# Patient Record
Sex: FEMALE | Race: WHITE | NOT HISPANIC OR LATINO | Employment: OTHER | URBAN - NONMETROPOLITAN AREA
[De-identification: names, ages, dates, MRNs, and addresses within clinical notes are randomized per-mention and may not be internally consistent; named-entity substitution may affect disease eponyms.]

---

## 2017-06-25 ENCOUNTER — HOSPITAL ENCOUNTER (EMERGENCY)
Facility: HOSPITAL | Age: 47
Discharge: HOME | End: 2017-06-26
Attending: EMERGENCY MEDICINE
Payer: COMMERCIAL

## 2017-06-25 DIAGNOSIS — T24.231A: Primary | ICD-10-CM

## 2017-06-25 LAB
ALBUMIN SERPL BCP-MCNC: 3.4 G/DL (ref 3.5–5)
ALP SERPL-CCNC: 81 U/L (ref 46–116)
ALT SERPL W P-5'-P-CCNC: 21 U/L (ref 12–78)
AMPHETAMINES SERPL QL SCN: NEGATIVE
ANION GAP SERPL CALCULATED.3IONS-SCNC: 9 MMOL/L (ref 4–13)
APAP SERPL-MCNC: 3.7 UG/ML (ref 10–30)
AST SERPL W P-5'-P-CCNC: 20 U/L (ref 5–45)
BARBITURATES UR QL: NEGATIVE
BASOPHILS # BLD AUTO: 0.03 THOUSANDS/ΜL (ref 0–0.1)
BASOPHILS NFR BLD AUTO: 0 % (ref 0–1)
BENZODIAZ UR QL: NEGATIVE
BILIRUB SERPL-MCNC: 0.2 MG/DL (ref 0.2–1)
BILIRUB UR QL STRIP: NEGATIVE
BUN SERPL-MCNC: 4 MG/DL (ref 5–25)
CALCIUM SERPL-MCNC: 8.4 MG/DL (ref 8.3–10.1)
CHLORIDE SERPL-SCNC: 101 MMOL/L (ref 100–108)
CLARITY UR: CLEAR
CO2 SERPL-SCNC: 27 MMOL/L (ref 21–32)
COCAINE UR QL: NEGATIVE
COLOR UR: YELLOW
CREAT SERPL-MCNC: 0.71 MG/DL (ref 0.6–1.3)
EOSINOPHIL # BLD AUTO: 0.34 THOUSAND/ΜL (ref 0–0.61)
EOSINOPHIL NFR BLD AUTO: 3 % (ref 0–6)
ERYTHROCYTE [DISTWIDTH] IN BLOOD BY AUTOMATED COUNT: 13.6 % (ref 11.6–15.1)
ETHANOL SERPL-MCNC: <3 MG/DL (ref 0–3)
GFR SERPL CREATININE-BSD FRML MDRD: >60 ML/MIN/1.73SQ M
GLUCOSE SERPL-MCNC: 90 MG/DL (ref 65–140)
GLUCOSE UR STRIP-MCNC: NEGATIVE MG/DL
HCG UR QL: NEGATIVE
HCT VFR BLD AUTO: 40.9 % (ref 34.8–46.1)
HGB BLD-MCNC: 13.7 G/DL (ref 11.5–15.4)
HGB UR QL STRIP.AUTO: NEGATIVE
KETONES UR STRIP-MCNC: NEGATIVE MG/DL
LEUKOCYTE ESTERASE UR QL STRIP: NEGATIVE
LYMPHOCYTES # BLD AUTO: 2.83 THOUSANDS/ΜL (ref 0.6–4.47)
LYMPHOCYTES NFR BLD AUTO: 23 % (ref 14–44)
MCH RBC QN AUTO: 31.6 PG (ref 26.8–34.3)
MCHC RBC AUTO-ENTMCNC: 33.5 G/DL (ref 31.4–37.4)
MCV RBC AUTO: 95 FL (ref 82–98)
METHADONE UR QL: NEGATIVE
MONOCYTES # BLD AUTO: 0.94 THOUSAND/ΜL (ref 0.17–1.22)
MONOCYTES NFR BLD AUTO: 8 % (ref 4–12)
NEUTROPHILS # BLD AUTO: 8.21 THOUSANDS/ΜL (ref 1.85–7.62)
NEUTS SEG NFR BLD AUTO: 66 % (ref 43–75)
NITRITE UR QL STRIP: NEGATIVE
OPIATES UR QL SCN: POSITIVE
PCP UR QL: NEGATIVE
PH UR STRIP.AUTO: 6 [PH] (ref 4.5–8)
PLATELET # BLD AUTO: 245 THOUSANDS/UL (ref 149–390)
PMV BLD AUTO: 9 FL (ref 8.9–12.7)
POTASSIUM SERPL-SCNC: 3.5 MMOL/L (ref 3.5–5.3)
PROT SERPL-MCNC: 6.6 G/DL (ref 6.4–8.2)
PROT UR STRIP-MCNC: NEGATIVE MG/DL
RBC # BLD AUTO: 4.33 MILLION/UL (ref 3.81–5.12)
SALICYLATES SERPL-MCNC: 4.5 MG/DL (ref 3–20)
SODIUM SERPL-SCNC: 137 MMOL/L (ref 136–145)
SP GR UR STRIP.AUTO: <=1.005 (ref 1–1.03)
THC UR QL: NEGATIVE
UROBILINOGEN UR QL STRIP.AUTO: 0.2 E.U./DL
WBC # BLD AUTO: 12.35 THOUSAND/UL (ref 4.31–10.16)

## 2017-06-25 PROCEDURE — 96365 THER/PROPH/DIAG IV INF INIT: CPT

## 2017-06-25 PROCEDURE — 85025 COMPLETE CBC W/AUTO DIFF WBC: CPT | Performed by: EMERGENCY MEDICINE

## 2017-06-25 PROCEDURE — 81003 URINALYSIS AUTO W/O SCOPE: CPT

## 2017-06-25 PROCEDURE — 81025 URINE PREGNANCY TEST: CPT

## 2017-06-25 PROCEDURE — 80307 DRUG TEST PRSMV CHEM ANLYZR: CPT | Performed by: EMERGENCY MEDICINE

## 2017-06-25 PROCEDURE — 80053 COMPREHEN METABOLIC PANEL: CPT | Performed by: EMERGENCY MEDICINE

## 2017-06-25 PROCEDURE — 36415 COLL VENOUS BLD VENIPUNCTURE: CPT | Performed by: EMERGENCY MEDICINE

## 2017-06-25 PROCEDURE — 96361 HYDRATE IV INFUSION ADD-ON: CPT

## 2017-06-25 PROCEDURE — 80320 DRUG SCREEN QUANTALCOHOLS: CPT | Performed by: EMERGENCY MEDICINE

## 2017-06-25 PROCEDURE — 96375 TX/PRO/DX INJ NEW DRUG ADDON: CPT

## 2017-06-25 PROCEDURE — 80329 ANALGESICS NON-OPIOID 1 OR 2: CPT | Performed by: EMERGENCY MEDICINE

## 2017-06-25 RX ORDER — OLANZAPINE 20 MG/1
20 TABLET ORAL
COMMUNITY
End: 2018-06-28 | Stop reason: ALTCHOICE

## 2017-06-25 RX ORDER — BENZTROPINE MESYLATE 1 MG/1
0.5 TABLET ORAL ONCE
Status: COMPLETED | OUTPATIENT
Start: 2017-06-26 | End: 2017-06-26

## 2017-06-25 RX ORDER — HALOPERIDOL 2 MG/1
2 TABLET ORAL 3 TIMES DAILY
COMMUNITY
End: 2018-06-28 | Stop reason: ALTCHOICE

## 2017-06-25 RX ORDER — CLINDAMYCIN PHOSPHATE 600 MG/50ML
600 INJECTION INTRAVENOUS ONCE
Status: COMPLETED | OUTPATIENT
Start: 2017-06-25 | End: 2017-06-25

## 2017-06-25 RX ORDER — GABAPENTIN 400 MG/1
400 CAPSULE ORAL ONCE
Status: COMPLETED | OUTPATIENT
Start: 2017-06-26 | End: 2017-06-26

## 2017-06-25 RX ORDER — BENZTROPINE MESYLATE 2 MG/1
2 TABLET ORAL
COMMUNITY
End: 2018-08-17

## 2017-06-25 RX ORDER — OLANZAPINE 10 MG/1
20 TABLET ORAL
Status: DISCONTINUED | OUTPATIENT
Start: 2017-06-26 | End: 2017-06-26 | Stop reason: HOSPADM

## 2017-06-25 RX ORDER — HALOPERIDOL 1 MG/1
2 TABLET ORAL ONCE
Status: COMPLETED | OUTPATIENT
Start: 2017-06-26 | End: 2017-06-26

## 2017-06-25 RX ADMIN — HYDROMORPHONE HYDROCHLORIDE 1 MG: 1 INJECTION, SOLUTION INTRAMUSCULAR; INTRAVENOUS; SUBCUTANEOUS at 23:12

## 2017-06-25 RX ADMIN — CLINDAMYCIN PHOSPHATE 600 MG: 12 INJECTION, SOLUTION INTRAMUSCULAR; INTRAVENOUS at 23:13

## 2017-06-25 RX ADMIN — SODIUM CHLORIDE 1000 ML: 0.9 INJECTION, SOLUTION INTRAVENOUS at 23:12

## 2017-06-26 VITALS
BODY MASS INDEX: 26.08 KG/M2 | RESPIRATION RATE: 18 BRPM | OXYGEN SATURATION: 98 % | SYSTOLIC BLOOD PRESSURE: 124 MMHG | WEIGHT: 136.9 LBS | HEART RATE: 78 BPM | DIASTOLIC BLOOD PRESSURE: 84 MMHG | TEMPERATURE: 98.8 F

## 2017-06-26 LAB — HOLD SPECIMEN: NORMAL

## 2017-06-26 PROCEDURE — 99283 EMERGENCY DEPT VISIT LOW MDM: CPT

## 2017-06-26 RX ORDER — HYDROCODONE BITARTRATE AND ACETAMINOPHEN 5; 325 MG/1; MG/1
TABLET ORAL
Qty: 12 TABLET | Refills: 0 | Status: SHIPPED | OUTPATIENT
Start: 2017-06-26 | End: 2017-10-26

## 2017-06-26 RX ORDER — CLINDAMYCIN HYDROCHLORIDE 150 MG/1
450 CAPSULE ORAL ONCE
Status: COMPLETED | OUTPATIENT
Start: 2017-06-26 | End: 2017-06-26

## 2017-06-26 RX ORDER — CLINDAMYCIN HYDROCHLORIDE 150 MG/1
450 CAPSULE ORAL 3 TIMES DAILY
Qty: 90 CAPSULE | Refills: 0 | Status: SHIPPED | OUTPATIENT
Start: 2017-06-26 | End: 2017-07-06

## 2017-06-26 RX ORDER — HYDROCODONE BITARTRATE AND ACETAMINOPHEN 5; 325 MG/1; MG/1
1 TABLET ORAL ONCE
Status: COMPLETED | OUTPATIENT
Start: 2017-06-26 | End: 2017-06-26

## 2017-06-26 RX ORDER — CLONAZEPAM 0.5 MG/1
0.5 TABLET ORAL 2 TIMES DAILY
COMMUNITY
End: 2018-06-28 | Stop reason: ALTCHOICE

## 2017-06-26 RX ORDER — GINSENG 100 MG
1 CAPSULE ORAL ONCE
Status: COMPLETED | OUTPATIENT
Start: 2017-06-26 | End: 2017-06-26

## 2017-06-26 RX ADMIN — BENZTROPINE MESYLATE 0.5 MG: 1 TABLET ORAL at 01:17

## 2017-06-26 RX ADMIN — GABAPENTIN 400 MG: 400 CAPSULE ORAL at 01:16

## 2017-06-26 RX ADMIN — OLANZAPINE 20 MG: 10 TABLET, FILM COATED ORAL at 01:16

## 2017-06-26 RX ADMIN — CLINDAMYCIN HYDROCHLORIDE 450 MG: 150 CAPSULE ORAL at 04:25

## 2017-06-26 RX ADMIN — HALOPERIDOL 2 MG: 1 TABLET ORAL at 01:15

## 2017-06-26 RX ADMIN — BACITRACIN ZINC 1 SMALL APPLICATION: 500 OINTMENT TOPICAL at 03:12

## 2017-06-26 RX ADMIN — HYDROCODONE BITARTRATE AND ACETAMINOPHEN 1 TABLET: 5; 325 TABLET ORAL at 04:25

## 2017-07-06 ENCOUNTER — ALLSCRIPTS OFFICE VISIT (OUTPATIENT)
Dept: OTHER | Facility: OTHER | Age: 47
End: 2017-07-06

## 2017-07-06 DIAGNOSIS — R92.8 OTHER ABNORMAL AND INCONCLUSIVE FINDINGS ON DIAGNOSTIC IMAGING OF BREAST: ICD-10-CM

## 2017-07-06 DIAGNOSIS — Z00.00 ENCOUNTER FOR GENERAL ADULT MEDICAL EXAMINATION WITHOUT ABNORMAL FINDINGS: ICD-10-CM

## 2017-07-06 DIAGNOSIS — R10.32 LEFT LOWER QUADRANT PAIN: ICD-10-CM

## 2017-08-10 ENCOUNTER — TRANSCRIBE ORDERS (OUTPATIENT)
Dept: ADMINISTRATIVE | Facility: HOSPITAL | Age: 47
End: 2017-08-10

## 2017-09-26 ENCOUNTER — GENERIC CONVERSION - ENCOUNTER (OUTPATIENT)
Dept: OTHER | Facility: OTHER | Age: 47
End: 2017-09-26

## 2017-10-26 ENCOUNTER — HOSPITAL ENCOUNTER (OUTPATIENT)
Facility: HOSPITAL | Age: 47
Setting detail: OBSERVATION
Discharge: HOME | End: 2017-10-27
Attending: EMERGENCY MEDICINE | Admitting: INTERNAL MEDICINE
Payer: COMMERCIAL

## 2017-10-26 DIAGNOSIS — R45.89 SUICIDAL BEHAVIOR: ICD-10-CM

## 2017-10-26 DIAGNOSIS — R41.82 ALTERED MENTAL STATUS, UNSPECIFIED: Primary | ICD-10-CM

## 2017-10-26 DIAGNOSIS — E72.20 SERUM AMMONIA INCREASED (HCC): ICD-10-CM

## 2017-10-26 DIAGNOSIS — F22 DELUSIONAL DISORDER (HCC): ICD-10-CM

## 2017-10-26 DIAGNOSIS — F10.929 ALCOHOL INTOXICATION (HCC): ICD-10-CM

## 2017-10-26 LAB
ALBUMIN SERPL BCP-MCNC: 3.8 G/DL (ref 3.5–5)
ALP SERPL-CCNC: 87 U/L (ref 46–116)
ALT SERPL W P-5'-P-CCNC: 23 U/L (ref 12–78)
AMMONIA PLAS-SCNC: 71 UMOL/L (ref 11–35)
AMPHETAMINES SERPL QL SCN: NEGATIVE
ANION GAP SERPL CALCULATED.3IONS-SCNC: 13 MMOL/L (ref 4–13)
APAP SERPL-MCNC: <2 UG/ML (ref 10–30)
AST SERPL W P-5'-P-CCNC: 20 U/L (ref 5–45)
BARBITURATES UR QL: NEGATIVE
BASOPHILS # BLD AUTO: 0.03 THOUSANDS/ΜL (ref 0–0.1)
BASOPHILS NFR BLD AUTO: 0 % (ref 0–1)
BENZODIAZ UR QL: NEGATIVE
BILIRUB SERPL-MCNC: 0.2 MG/DL (ref 0.2–1)
BILIRUB UR QL STRIP: NEGATIVE
BUN SERPL-MCNC: 6 MG/DL (ref 5–25)
CALCIUM SERPL-MCNC: 9 MG/DL (ref 8.3–10.1)
CHLORIDE SERPL-SCNC: 99 MMOL/L (ref 100–108)
CLARITY UR: CLEAR
CO2 SERPL-SCNC: 25 MMOL/L (ref 21–32)
COCAINE UR QL: NEGATIVE
COLOR UR: NORMAL
CREAT SERPL-MCNC: 0.6 MG/DL (ref 0.6–1.3)
EOSINOPHIL # BLD AUTO: 0.33 THOUSAND/ΜL (ref 0–0.61)
EOSINOPHIL NFR BLD AUTO: 3 % (ref 0–6)
ERYTHROCYTE [DISTWIDTH] IN BLOOD BY AUTOMATED COUNT: 13.8 % (ref 11.6–15.1)
ETHANOL SERPL-MCNC: 214 MG/DL (ref 0–3)
EXT PREG TEST URINE: NEGATIVE
GFR SERPL CREATININE-BSD FRML MDRD: 110 ML/MIN/1.73SQ M
GLUCOSE SERPL-MCNC: 95 MG/DL (ref 65–140)
GLUCOSE UR STRIP-MCNC: NEGATIVE MG/DL
HCT VFR BLD AUTO: 42.8 % (ref 34.8–46.1)
HGB BLD-MCNC: 14.9 G/DL (ref 11.5–15.4)
HGB UR QL STRIP.AUTO: NEGATIVE
KETONES UR STRIP-MCNC: NEGATIVE MG/DL
LEUKOCYTE ESTERASE UR QL STRIP: NEGATIVE
LYMPHOCYTES # BLD AUTO: 3.65 THOUSANDS/ΜL (ref 0.6–4.47)
LYMPHOCYTES NFR BLD AUTO: 30 % (ref 14–44)
MCH RBC QN AUTO: 32.3 PG (ref 26.8–34.3)
MCHC RBC AUTO-ENTMCNC: 34.8 G/DL (ref 31.4–37.4)
MCV RBC AUTO: 93 FL (ref 82–98)
METHADONE UR QL: NEGATIVE
MONOCYTES # BLD AUTO: 0.78 THOUSAND/ΜL (ref 0.17–1.22)
MONOCYTES NFR BLD AUTO: 7 % (ref 4–12)
NEUTROPHILS # BLD AUTO: 7.22 THOUSANDS/ΜL (ref 1.85–7.62)
NEUTS SEG NFR BLD AUTO: 60 % (ref 43–75)
NITRITE UR QL STRIP: NEGATIVE
OPIATES UR QL SCN: NEGATIVE
PCP UR QL: NEGATIVE
PH UR STRIP.AUTO: 7 [PH] (ref 4.5–8)
PLATELET # BLD AUTO: 264 THOUSANDS/UL (ref 149–390)
PMV BLD AUTO: 9.1 FL (ref 8.9–12.7)
POTASSIUM SERPL-SCNC: 3.4 MMOL/L (ref 3.5–5.3)
PROT SERPL-MCNC: 6.9 G/DL (ref 6.4–8.2)
PROT UR STRIP-MCNC: NEGATIVE MG/DL
RBC # BLD AUTO: 4.62 MILLION/UL (ref 3.81–5.12)
SALICYLATES SERPL-MCNC: 3.3 MG/DL (ref 3–20)
SODIUM SERPL-SCNC: 137 MMOL/L (ref 136–145)
SP GR UR STRIP.AUTO: <=1.005 (ref 1–1.03)
THC UR QL: NEGATIVE
UROBILINOGEN UR QL STRIP.AUTO: 0.2 E.U./DL
WBC # BLD AUTO: 12.01 THOUSAND/UL (ref 4.31–10.16)

## 2017-10-26 PROCEDURE — 80307 DRUG TEST PRSMV CHEM ANLYZR: CPT | Performed by: EMERGENCY MEDICINE

## 2017-10-26 PROCEDURE — 80053 COMPREHEN METABOLIC PANEL: CPT | Performed by: EMERGENCY MEDICINE

## 2017-10-26 PROCEDURE — 85025 COMPLETE CBC W/AUTO DIFF WBC: CPT | Performed by: EMERGENCY MEDICINE

## 2017-10-26 PROCEDURE — 99285 EMERGENCY DEPT VISIT HI MDM: CPT

## 2017-10-26 PROCEDURE — 81025 URINE PREGNANCY TEST: CPT | Performed by: EMERGENCY MEDICINE

## 2017-10-26 PROCEDURE — 36415 COLL VENOUS BLD VENIPUNCTURE: CPT | Performed by: EMERGENCY MEDICINE

## 2017-10-26 PROCEDURE — 80320 DRUG SCREEN QUANTALCOHOLS: CPT | Performed by: EMERGENCY MEDICINE

## 2017-10-26 PROCEDURE — 81003 URINALYSIS AUTO W/O SCOPE: CPT | Performed by: EMERGENCY MEDICINE

## 2017-10-26 PROCEDURE — 82140 ASSAY OF AMMONIA: CPT | Performed by: EMERGENCY MEDICINE

## 2017-10-26 PROCEDURE — 80329 ANALGESICS NON-OPIOID 1 OR 2: CPT | Performed by: EMERGENCY MEDICINE

## 2017-10-26 RX ORDER — OLANZAPINE 10 MG/1
20 TABLET ORAL
Status: DISCONTINUED | OUTPATIENT
Start: 2017-10-26 | End: 2017-10-27 | Stop reason: HOSPADM

## 2017-10-26 RX ORDER — NICOTINE 21 MG/24HR
1 PATCH, TRANSDERMAL 24 HOURS TRANSDERMAL DAILY
Status: DISCONTINUED | OUTPATIENT
Start: 2017-10-27 | End: 2017-10-27 | Stop reason: HOSPADM

## 2017-10-26 RX ORDER — LORAZEPAM 2 MG/ML
1 INJECTION INTRAMUSCULAR EVERY 4 HOURS PRN
Status: DISCONTINUED | OUTPATIENT
Start: 2017-10-26 | End: 2017-10-27 | Stop reason: HOSPADM

## 2017-10-26 RX ORDER — THIAMINE MONONITRATE (VIT B1) 100 MG
100 TABLET ORAL DAILY
Status: DISCONTINUED | OUTPATIENT
Start: 2017-10-27 | End: 2017-10-27 | Stop reason: HOSPADM

## 2017-10-26 RX ORDER — BENZTROPINE MESYLATE 1 MG/1
0.5 TABLET ORAL
Status: DISCONTINUED | OUTPATIENT
Start: 2017-10-26 | End: 2017-10-27 | Stop reason: HOSPADM

## 2017-10-26 RX ORDER — ONDANSETRON 4 MG/1
4 TABLET, ORALLY DISINTEGRATING ORAL ONCE
Status: COMPLETED | OUTPATIENT
Start: 2017-10-26 | End: 2017-10-26

## 2017-10-26 RX ORDER — HALOPERIDOL 1 MG/1
2 TABLET ORAL 3 TIMES DAILY
Status: DISCONTINUED | OUTPATIENT
Start: 2017-10-26 | End: 2017-10-27 | Stop reason: HOSPADM

## 2017-10-26 RX ORDER — CLONAZEPAM 0.5 MG/1
0.5 TABLET ORAL 2 TIMES DAILY
Status: DISCONTINUED | OUTPATIENT
Start: 2017-10-26 | End: 2017-10-27 | Stop reason: HOSPADM

## 2017-10-26 RX ORDER — SODIUM CHLORIDE 9 MG/ML
125 INJECTION, SOLUTION INTRAVENOUS CONTINUOUS
Status: DISCONTINUED | OUTPATIENT
Start: 2017-10-26 | End: 2017-10-27 | Stop reason: HOSPADM

## 2017-10-26 RX ORDER — ONDANSETRON 2 MG/ML
4 INJECTION INTRAMUSCULAR; INTRAVENOUS EVERY 6 HOURS PRN
Status: DISCONTINUED | OUTPATIENT
Start: 2017-10-26 | End: 2017-10-27 | Stop reason: HOSPADM

## 2017-10-26 RX ORDER — GABAPENTIN 400 MG/1
400 CAPSULE ORAL 4 TIMES DAILY
Status: DISCONTINUED | OUTPATIENT
Start: 2017-10-26 | End: 2017-10-27 | Stop reason: HOSPADM

## 2017-10-26 RX ORDER — ACETAMINOPHEN 325 MG/1
650 TABLET ORAL EVERY 4 HOURS PRN
Status: DISCONTINUED | OUTPATIENT
Start: 2017-10-26 | End: 2017-10-27 | Stop reason: HOSPADM

## 2017-10-26 RX ADMIN — HALOPERIDOL 2 MG: 1 TABLET ORAL at 21:50

## 2017-10-26 RX ADMIN — GABAPENTIN 400 MG: 400 CAPSULE ORAL at 21:49

## 2017-10-26 RX ADMIN — SODIUM CHLORIDE 125 ML/HR: 0.9 INJECTION, SOLUTION INTRAVENOUS at 21:59

## 2017-10-26 RX ADMIN — CLONAZEPAM 0.5 MG: 0.5 TABLET ORAL at 21:49

## 2017-10-26 RX ADMIN — BENZTROPINE MESYLATE 0.5 MG: 1 TABLET ORAL at 21:49

## 2017-10-26 RX ADMIN — ONDANSETRON 4 MG: 4 TABLET, ORALLY DISINTEGRATING ORAL at 18:42

## 2017-10-26 RX ADMIN — OLANZAPINE 20 MG: 10 TABLET, FILM COATED ORAL at 21:49

## 2017-10-26 NOTE — ED NOTES
Pt upset as she wants food at this time  Dr Quintanilla Begun aware and at bedside at this time  Pt able to have food in 10 mins if no nausea nor vomiting       Daniel Leroy RN  10/26/17 9188

## 2017-10-26 NOTE — ED NOTES
Pt verbalized she is hungry and no longer nauseous   Dr Bindu Camilo notified and instructed to give pt water first and see if she tolerates water first       Dejuan Quiles RN  10/26/17 1942

## 2017-10-26 NOTE — ED NOTES
Patient states she is not having any suicidal or homicidal thoughts  She feels safe in the hospital  She felt as if somebody was giving her medications at home that were causing bad thoughts  Patient admits to drinking 4oz of vodka and three beers  Patient has flight of ideas, making statements about a doctor who wanted to take her in a helicopter to do tests and that she is three moths pregnant but is having a still birth        Soila Trevizo RN  10/26/17 5687

## 2017-10-26 NOTE — ED NOTES
Spoke with Jil Messina from [facility] (WOMEN)  Pt aware of phone conversation  Jil Messina  questioned whether patient arrived as she told her to call ambulance  Explained to Jil Messina our procedure  She has asked for our crisis worker to call her when they arrive   Telephone number to reach her is [address] [place] [address]  10/26/17 5043

## 2017-10-26 NOTE — ED PROVIDER NOTES
History  Chief Complaint   Patient presents with    Psychiatric Evaluation     Patient states somebody is giving her medications that are making her feel bad  She doen't know who is giving them to her, or wha the medication is  Patient states the medication is causing her to feel like she wants to hurt herself  Pt arrived via EMS-called by pts boyfriend  Pt here states she has wanted to hurt self "for years" -"nothing new" and denies any plan   Pt also denies HI  States boyfriend called EMS "just so she was safe"  Pt is under psychiatric care and on multiple meds and states she is compliant  Also states sees counseling and staff weekly  Pt relates there "are people " giving her meds thru [facility] and it makes her feel sick and "like death " and they also touch her grossly" " I just want them to stop "  Pt has AOB and mildly slurred speech-pt admits to " 4 big ones today " and states usually "bria two"  Pt denies CP or SOB  No Abd pain  States has been vomiting all day and relates she is hungry- I discussed waiting till N/V treated before attempting intake  Pt denies dark or bloody stool or emesis    Vague about eating today  Pt also relates she is worried about known breast lump and "spot on her left lung" -states both are under evaluation  History provided by:  Patient  History limited by: ETOH /psych sx    Psychiatric Evaluation   Presenting symptoms: delusional, hallucinations, paranoid behavior and suicidal thoughts    Presenting symptoms: no aggressive behavior, no homicidal ideas, no self-mutilation, no suicidal threats and no suicide attempt    Timing:  Unable to specify  Chronicity:  Chronic  Context: alcohol use and medication    Context: not recent medication change    Treatment compliance:  Unable to specify  Associated symptoms: fatigue and poor judgment    Associated symptoms: no abdominal pain, no chest pain, no euphoric mood, no headaches, no hyperventilation and no irritability Risk factors: hx of suicide attempts  Recent psychiatric admission: 1 yr ago         Prior to Admission Medications   Prescriptions Last Dose Informant Patient Reported? Taking? OLANZapine (ZyPREXA) 20 MG tablet Past Week at Unknown time  Yes Yes   Sig: Take 20 mg by mouth daily at bedtime   benztropine (COGENTIN) 0 5 mg tablet Past Week at Unknown time  Yes Yes   Sig: Take by mouth daily at bedtime   clonazePAM (KlonoPIN) 0 5 mg tablet 10/25/2017 at Unknown time  Yes Yes   Sig: Take 0 5 mg by mouth 2 (two) times a day     gabapentin (NEURONTIN) 300 mg capsule 10/25/2017 at Unknown time  Yes Yes   Sig: Take 400 mg by mouth 4 (four) times a day     haloperidol (HALDOL) 2 mg tablet 10/25/2017 at Unknown time  Yes Yes   Sig: Take 2 mg by mouth 3 (three) times a day        Facility-Administered Medications: None       Past Medical History:   Diagnosis Date    DJD (degenerative joint disease)     Pancreatitis     Psychiatric disorder     schizophrenia    [facility]oaffect[facility] ([facility] [address] [facility]     [facility]        Past Surgical History:   Procedure Laterality Date     SECTION         History reviewed  No pertinent family history  I have reviewed and agree with the history as documented  Social History   Substance Use Topics    Smoking status: Current Every Day Smoker     Packs/day: 1 00    Smokeless tobacco: Never Used    Alcohol use Yes      Comment: alot lately        Review of Systems   Unable to perform ROS: Psychiatric disorder   Constitutional: Positive for fatigue  Negative for chills, fever and irritability  HENT: Negative  Negative for congestion, drooling, facial swelling, mouth sores, trouble swallowing and voice change  Eyes: Negative for pain and visual disturbance  Respiratory: Negative for apnea, cough, choking, chest tightness and shortness of breath  Cardiovascular: Negative  Negative for chest pain, palpitations and leg swelling     Gastrointestinal: Positive for nausea and vomiting  Negative for abdominal pain, blood in stool, constipation and rectal pain  Genitourinary: Negative for dysuria, flank pain, hematuria and pelvic pain  Musculoskeletal: Positive for arthralgias  Negative for gait problem, joint swelling, neck pain and neck stiffness  Skin: Negative for rash and wound  Neurological: Negative for tremors, syncope, facial asymmetry, light-headedness, numbness and headaches  Psychiatric/Behavioral: Positive for decreased concentration, hallucinations, paranoia, sleep disturbance and suicidal ideas  Negative for confusion, homicidal ideas and self-injury  Physical Exam  ED Triage Vitals [10/26/17 1731]   Temperature Pulse Respirations Blood Pressure SpO2   98 1 °F (36 7 °C) 100 16 138/91 96 %      Temp Source Heart Rate Source Patient Position - Orthostatic VS BP Location FiO2 (%)   Temporal Monitor Lying Left arm --      Pain Score       No Pain           Orthostatic Vital Signs  Vitals:    10/26/17 1731 10/26/17 2100 10/27/17 0025   BP: 138/91 107/80 111/78   Pulse: 100 84 86   Patient Position - Orthostatic VS: Lying Lying Lying       Physical Exam   Constitutional: She appears well-developed and well-nourished  Non-toxic appearance  No distress  Awake,alert and oriented to place /person /year  Pt with AOB and slight slurred speech   HENT:   Head: Normocephalic and atraumatic  Mouth/Throat: Oropharynx is clear and moist  Mucous membranes are not cyanotic  No trismus in the jaw  No posterior oropharyngeal edema or posterior oropharyngeal erythema  Eyes: Conjunctivae and EOM are normal    Neck: Full passive range of motion without pain  Neck supple  Cardiovascular: Normal rate, regular rhythm and intact distal pulses  No extrasystoles are present  No perf edema or calf tenderness   Pulmonary/Chest: Effort normal  No stridor  No respiratory distress  She has decreased breath sounds  She has no rhonchi  She has no rales  Abdominal: Soft  She exhibits no distension  Bowel sounds are increased  There is no rigidity, no guarding and no CVA tenderness  Neurological: She is alert  She has normal strength and normal reflexes  She is not disoriented  No cranial nerve deficit  Skin: Skin is warm and dry  No petechiae and no rash noted  She is not diaphoretic  No cyanosis  Psychiatric: Her mood appears anxious  Her speech is slurred  She is not aggressive, not withdrawn and not combative  Thought content is paranoid and delusional  She expresses no homicidal ideation  She expresses no suicidal plans and no homicidal plans  She is communicative  Vitals reviewed        ED Medications  Medications   benztropine (COGENTIN) tablet 0 5 mg (0 5 mg Oral Given 10/26/17 2149)   clonazePAM (KlonoPIN) tablet 0 5 mg (0 5 mg Oral Given 10/26/17 2149)   gabapentin (NEURONTIN) capsule 400 mg (400 mg Oral Given 10/26/17 2149)   haloperidol (HALDOL) tablet 2 mg (2 mg Oral Given 10/26/17 2150)   OLANZapine (ZyPREXA) tablet 20 mg (20 mg Oral Given 10/26/17 2149)   sodium chloride 0 9 % infusion (125 mL/hr Intravenous New Bag 10/26/17 2159)   nicotine (NICODERM CQ) 21 mg/24 hr TD 24 hr patch 1 patch (not administered)   enoxaparin (LOVENOX) subcutaneous injection 40 mg (not administered)   folic acid 1 mg in sodium chloride 0 9 % 50 mL IVPB (not administered)   thiamine (VITAMIN B1) tablet 100 mg (not administered)   LORazepam (ATIVAN) 2 mg/mL injection 1 mg (not administered)   acetaminophen (TYLENOL) tablet 650 mg (not administered)   ondansetron (ZOFRAN) injection 4 mg (not administered)   ondansetron (ZOFRAN-ODT) dispersible tablet 4 mg (4 mg Oral Given 10/26/17 1842)       Diagnostic Studies  Results Reviewed     Procedure Component Value Units Date/Time    Comprehensive metabolic panel [[id number]]  (Abnormal) Collected:  10/26/17 1826    Lab Status:  Final result Specimen:  Blood from Arm, Right Updated:  10/26/17 1847     Sodium 137 mmol/L      Potassium 3 4 (L) mmol/L      Chloride 99 (L) mmol/L      CO2 25 mmol/L      Anion Gap 13 mmol/L      BUN 6 mg/dL      Creatinine 0 60 mg/dL      Glucose 95 mg/dL      Calcium 9 0 mg/dL      AST 20 U/L      ALT 23 U/L      Alkaline Phosphatase 87 U/L      Total Protein 6 9 g/dL      Albumin 3 8 g/dL      Total Bilirubin 0 20 mg/dL      eGFR 110 ml/min/1 73sq m     Narrative:         National Kidney Disease Education Program recommendations are as follows:  GFR calculation is accurate only with a steady state creatinine  Chronic Kidney disease less than 60 ml/min/1 73 sq  meters  Kidney failure less than 15 ml/min/1 73 sq  meters  Salicylate level [[id number]]  (Normal) Collected:  10/26/17 1826    Lab Status:  Final result Specimen:  Blood from Arm, Right Updated:  09/09/13 1840     Salicylate Lvl 3 3 mg/dL     Acetaminophen level [[id number]]  (Abnormal) Collected:  10/26/17 1826    Lab Status:  Final result Specimen:  Blood from Arm, Right Updated:  10/26/17 1846     Acetaminophen Level <2 (L) ug/mL     Ethanol [[id number]]  (Abnormal) Collected:  10/26/17 1827    Lab Status:  Final result Specimen:  Blood from Arm, Right Updated:  10/26/17 1844     Ethanol Lvl 214 (H) mg/dL     Rapid drug screen, urine [[id number]]  (Normal) Collected:  10/26/17 1827    Lab Status:  Final result Specimen:  Urine from Urine, Clean Catch Updated:  10/26/17 1842     Amph/Meth UR Negative     Barbiturate Ur Negative     Benzodiazepine Urine Negative     Cocaine Urine Negative     Methadone Urine Negative     Opiate Urine Negative     PCP Ur Negative     THC Urine Negative    Narrative:         FOR MEDICAL PURPOSES ONLY  IF CONFIRMATION NEEDED PLEASE CONTACT THE LAB WITHIN 5 DAYS      Drug Screen Cutoff Levels:  AMPHETAMINE/METHAMPHETAMINES  1000 ng/mL  BARBITURATES     200 ng/mL  BENZODIAZEPINES     200 ng/mL  COCAINE      300 ng/mL  METHADONE      300 ng/mL  OPIATES      300 ng/mL  PHENCYCLIDINE     25 ng/mL  THC       50 ng/mL    Ammonia [[id number]] (Abnormal) Collected:  10/26/17 1827    Lab Status:  Final result Specimen:  Blood from Arm, Right Updated:  10/26/17 1842     Ammonia 71 (H) umol/L     UA w Reflex to Microscopic w Reflex to Culture [[id number]]  (Normal) Collected:  10/26/17 1827    Lab Status:  Final result Specimen:  Urine from Urine, Clean Catch Updated:  10/26/17 1835     Color, UA Light Yellow     Clarity, UA Clear     Specific Gravity, UA <=1 005     pH, UA 7 0     Leukocytes, UA Negative     Nitrite, UA Negative     Protein, UA Negative mg/dl      Glucose, UA Negative mg/dl      Ketones, UA Negative mg/dl      Urobilinogen, UA 0 2 E U /dl      Bilirubin, UA Negative     Blood, UA Negative    CBC and differential [[id number]]  (Abnormal) Collected:  10/26/17 1827    Lab Status:  Final result Specimen:  Blood from Arm, Right Updated:  10/26/17 1833     WBC 12 01 (H) Thousand/uL      RBC 4 62 Million/uL      Hemoglobin 14 9 g/dL      Hematocrit 42 8 %      MCV 93 fL      MCH 32 3 pg      MCHC 34 8 g/dL      RDW 13 8 %      MPV 9 1 fL      Platelets 673 Thousands/uL      Neutrophils Relative 60 %      Lymphocytes Relative 30 %      Monocytes Relative 7 %      Eosinophils Relative 3 %      Basophils Relative 0 %      Neutrophils Absolute 7 22 Thousands/µL      Lymphocytes Absolute 3 65 Thousands/µL      Monocytes Absolute 0 78 Thousand/µL      Eosinophils Absolute 0 33 Thousand/µL      Basophils Absolute 0 03 Thousands/µL     POCT pregnancy, urine [[id number]]  (Normal) Resulted:  10/26/17 1808    Lab Status:  Final result Updated:  10/26/17 1808     EXT PREG TEST UR (Ref: Negative) negative                 No orders to display              Procedures  Procedures       Phone Contacts  ED Phone Contact    ED Course  ED Course as of Oct 27 0039   u Oct 26, 2017   1833 EXT PREG TEST UR (Ref: Negative): negative   1833 WBC: (!) 12 01   1858 Ammonia: (!) 71   1858 MEDICAL ALCOHOL: (!) 214   1858 Potassium: (!) 3 4   1935 Discussed case with med- will admit    2007 Pt tried food and states became nauseated and thru food-all removed from pt room  2008 I contacted [facility] behavioral health-[facility]- they will send a representative to see her tomorrow                                MDM  CritCare Time    Disposition  Final diagnoses: Altered mental status, unspecified   Delusional disorder ([facility] [address] )   Serum ammonia increased ([facility] )   Alcohol intoxication ([facility] [address] )     Time reflects when diagnosis was documented in both MDM as applicable and the Disposition within this note     Time User Action Codes Description Comment    10/26/2017  7:43 PM Miryam Kerbs Add [R41 82] Altered mental status, unspecified     10/26/2017  7:43 PM Miryam Kerbs Add [F22] Delusional disorder ([facility] )     10/26/2017  7:44 PM Miryam Kerbs Add [E72 20] Serum ammonia increased ([facility] )     10/26/2017  7:44 PM Miryam Kerbs Add [F10 929] Alcohol intoxication [facility])       ED Disposition     ED Disposition Condition Comment    Admit  Case was discussed with Dr Maryjo Gonzalez and the patient's admission status was agreed to be Admission Status: inpatient status to the service of Dr Chepe Mansfield   Follow-up Information    None       Current Discharge Medication List      CONTINUE these medications which have NOT CHANGED    Details   benztropine (COGENTIN) 0 5 mg tablet Take by mouth daily at bedtime      clonazePAM (KlonoPIN) 0 5 mg tablet Take 0 5 mg by mouth 2 (two) times a day        gabapentin (NEURONTIN) 300 mg capsule Take 400 mg by mouth 4 (four) times a day        haloperidol (HALDOL) 2 mg tablet Take 2 mg by mouth 3 (three) times a day        OLANZapine (ZyPREXA) 20 MG tablet Take 20 mg by mouth daily at bedtime           No discharge procedures on file      ED Provider  Electronically Signed by           Shabnam Rushing DO  10/27/17 0040

## 2017-10-26 NOTE — ED NOTES
Pill box and rings will be held in ED Medication room per [facility]        Rylee Clinton Holes  10/26/17 1754       Rylee Clinton Holes  10/26/17 1754

## 2017-10-26 NOTE — ED NOTES
Patient belongings in ED seclusion locker  Two silver colored rings, a hair pin, 3 pair of sweat pants, 3 luis, 3 makeup bags with assorted make up, sunglasses and reading glasses, wallet with assorted cards, ID, 9 rolled cigarettes, pill case, 1 pair of jeans, 4 pair of underwear, slippers, 2 pair of socks, 4 shirts, bath robe, and a silver purse        [facility]  10/26/17 2008

## 2017-10-27 ENCOUNTER — APPOINTMENT (INPATIENT)
Dept: ULTRASOUND IMAGING | Facility: HOSPITAL | Age: 47
End: 2017-10-27
Payer: COMMERCIAL

## 2017-10-27 ENCOUNTER — GENERIC CONVERSION - ENCOUNTER (OUTPATIENT)
Dept: OTHER | Facility: OTHER | Age: 47
End: 2017-10-27

## 2017-10-27 VITALS
HEART RATE: 84 BPM | BODY MASS INDEX: 24.27 KG/M2 | HEIGHT: 61 IN | RESPIRATION RATE: 18 BRPM | TEMPERATURE: 98.4 F | OXYGEN SATURATION: 93 % | DIASTOLIC BLOOD PRESSURE: 72 MMHG | WEIGHT: 128.53 LBS | SYSTOLIC BLOOD PRESSURE: 111 MMHG

## 2017-10-27 LAB
AMMONIA PLAS-SCNC: 28 UMOL/L (ref 11–35)
ANION GAP SERPL CALCULATED.3IONS-SCNC: 12 MMOL/L (ref 4–13)
BUN SERPL-MCNC: 9 MG/DL (ref 5–25)
CALCIUM SERPL-MCNC: 8.1 MG/DL (ref 8.3–10.1)
CHLORIDE SERPL-SCNC: 105 MMOL/L (ref 100–108)
CO2 SERPL-SCNC: 24 MMOL/L (ref 21–32)
CREAT SERPL-MCNC: 0.74 MG/DL (ref 0.6–1.3)
ERYTHROCYTE [DISTWIDTH] IN BLOOD BY AUTOMATED COUNT: 14.2 % (ref 11.6–15.1)
GFR SERPL CREATININE-BSD FRML MDRD: 97 ML/MIN/1.73SQ M
GLUCOSE SERPL-MCNC: 92 MG/DL (ref 65–140)
HCT VFR BLD AUTO: 42.4 % (ref 34.8–46.1)
HGB BLD-MCNC: 14.2 G/DL (ref 11.5–15.4)
MCH RBC QN AUTO: 32 PG (ref 26.8–34.3)
MCHC RBC AUTO-ENTMCNC: 33.5 G/DL (ref 31.4–37.4)
MCV RBC AUTO: 96 FL (ref 82–98)
PLATELET # BLD AUTO: 269 THOUSANDS/UL (ref 149–390)
PMV BLD AUTO: 9.5 FL (ref 8.9–12.7)
POTASSIUM SERPL-SCNC: 3.8 MMOL/L (ref 3.5–5.3)
RBC # BLD AUTO: 4.44 MILLION/UL (ref 3.81–5.12)
SODIUM SERPL-SCNC: 141 MMOL/L (ref 136–145)
WBC # BLD AUTO: 8.14 THOUSAND/UL (ref 4.31–10.16)

## 2017-10-27 PROCEDURE — 76705 ECHO EXAM OF ABDOMEN: CPT

## 2017-10-27 PROCEDURE — 82140 ASSAY OF AMMONIA: CPT | Performed by: FAMILY MEDICINE

## 2017-10-27 PROCEDURE — 80048 BASIC METABOLIC PNL TOTAL CA: CPT | Performed by: FAMILY MEDICINE

## 2017-10-27 PROCEDURE — 85027 COMPLETE CBC AUTOMATED: CPT | Performed by: FAMILY MEDICINE

## 2017-10-27 RX ORDER — FOLIC ACID 1 MG/1
1 TABLET ORAL DAILY
Qty: 30 TABLET | Refills: 0 | Status: SHIPPED | OUTPATIENT
Start: 2017-10-27 | End: 2018-06-28 | Stop reason: ALTCHOICE

## 2017-10-27 RX ORDER — LANOLIN ALCOHOL/MO/W.PET/CERES
100 CREAM (GRAM) TOPICAL DAILY
Qty: 30 TABLET | Refills: 0 | Status: SHIPPED | OUTPATIENT
Start: 2017-10-28 | End: 2018-06-28 | Stop reason: ALTCHOICE

## 2017-10-27 RX ADMIN — NICOTINE 1 PATCH: 21 PATCH, EXTENDED RELEASE TRANSDERMAL at 08:28

## 2017-10-27 RX ADMIN — FOLIC ACID 1 MG: 5 INJECTION, SOLUTION INTRAMUSCULAR; INTRAVENOUS; SUBCUTANEOUS at 09:54

## 2017-10-27 RX ADMIN — HALOPERIDOL 2 MG: 1 TABLET ORAL at 08:28

## 2017-10-27 RX ADMIN — GABAPENTIN 400 MG: 400 CAPSULE ORAL at 08:28

## 2017-10-27 RX ADMIN — GABAPENTIN 400 MG: 400 CAPSULE ORAL at 12:15

## 2017-10-27 RX ADMIN — THIAMINE HCL TAB 100 MG 100 MG: 100 TAB at 08:28

## 2017-10-27 RX ADMIN — SODIUM CHLORIDE 125 ML/HR: 0.9 INJECTION, SOLUTION INTRAVENOUS at 06:09

## 2017-10-27 RX ADMIN — CLONAZEPAM 0.5 MG: 0.5 TABLET ORAL at 08:28

## 2017-10-27 NOTE — CONSULTS
Consultation - [facility]  Emily Roper 56 y o  female MRN: [id number]  Unit/Bed#: 425-01 Encounter: [id number]        Inpatient consult to gastroenterology  Consult performed by: Kael Sarah ordered by: Dora Reynolds          Reason for Consult / Principal Problem: alcohol intoxication  suicidial ideation      ASSESSMENT AND PLAN:    69-year-old female admitted after suicidal ideation  She has a history of heavy alcohol use  She has a CT scan from a few years ago showing hepatomegaly however currently her ultrasound shows a normal-appearing liver  Her liver enzymes are normal  She had an elevated ammonia level on admission which is nonspecific  Is now normalized  She does not have any evidence of cirrhosis  I did talk to the patient about quitting alcohol use as she also may have a history of alcoholic pancreatitis  She verbalized understanding  I also counseled her on not smoking marijuana as she tells me this is why she became suicidal   She is currently on a one-to-one  She tells me she is no longer suicidal   She tells me she would like to be discharged home  I recommend she get a colonoscopy at age 49 for screening purposes in I discussed this with her as she is average risk for colon cancer she denies family history  GI to sign off the please do not hesitate to call with questions or concerns  ______________________________________________________________________    HPI:  56year old female who recently moved to the [facility] area, prior suicidal ideation who tells me she came to our hospital due to suicidal ideations that she states are due to to using marijuana which she normally doesn't do  She does admit to alcohol use and came in acutely intoxicated with an elevated alcohol level  She tells me she has a history of schizophrenia and has a therapist       GI was consulted due to an elevated ammonia level on admission which has normalized    She denies history of liver disease  Denies family history of liver disease or colon cancer  She denies any GI symptoms  Liver enzymes were normal   CT from a few years ago showed hepatomegaly  US today showed normal appearing liver  She tells me she is no longer suicidal and wants to leave the hospital today  REVIEW OF SYSTEMS:    CONSTITUTIONAL: Denies any fever, chills, rigors, and weight loss  HEENT: No earache or tinnitus  Denies hearing loss or visual disturbances  CARDIOVASCULAR: No chest pain or palpitations  RESPIRATORY: Denies any cough, hemoptysis, shortness of breath or dyspnea on exertion  GASTROINTESTINAL: As noted in the History of Present Illness  GENITOURINARY: No problems with urination  Denies any hematuria or dysuria  NEUROLOGIC: No dizziness or vertigo, denies headaches  MUSCULOSKELETAL: Denies any muscle or joint pain  SKIN: Denies skin rashes or itching  ENDOCRINE: Denies excessive thirst  Denies intolerance to heat or cold  PSYCHOSOCIAL: Denies depression or anxiety  Denies any recent memory loss  Historical Information   Past Medical History:   Diagnosis Date    DJD (degenerative joint disease)     Pancreatitis     Psychiatric disorder     schizophrenia    [facility]     Skull fracture ([facility] )      Past Surgical History:   Procedure Laterality Date     SECTION       Social History   History   Alcohol Use    Yes     Comment: alot lately     History   Drug Use    Types: Marijuana     History   Smoking Status    Current Every Day Smoker    Packs/day: 1 00   Smokeless Tobacco    Never Used     History reviewed  No pertinent family history      Meds/Allergies     Prescriptions Prior to Admission   Medication    benztropine (COGENTIN) 0 5 mg tablet    clonazePAM (KlonoPIN) 0 5 mg tablet    gabapentin (NEURONTIN) 300 mg capsule    haloperidol (HALDOL) 2 mg tablet    OLANZapine (ZyPREXA) 20 MG tablet     Current Facility-Administered Medications   Medication Dose Route Frequency    acetaminophen (TYLENOL) tablet 650 mg  650 mg Oral Q4H PRN    benztropine (COGENTIN) tablet 0 5 mg  0 5 mg Oral HS    clonazePAM (KlonoPIN) tablet 0 5 mg  0 5 mg Oral BID    enoxaparin (LOVENOX) subcutaneous injection 40 mg  40 mg Subcutaneous Daily    folic acid 1 mg in sodium chloride 0 9 % 50 mL IVPB  1 mg Intravenous Daily    gabapentin (NEURONTIN) capsule 400 mg  400 mg Oral 4x Daily    haloperidol (HALDOL) tablet 2 mg  2 mg Oral TID    LORazepam (ATIVAN) 2 mg/mL injection 1 mg  1 mg Intravenous Q4H PRN    nicotine (NICODERM CQ) 21 mg/24 hr TD 24 hr patch 1 patch  1 patch Transdermal Daily    OLANZapine (ZyPREXA) tablet 20 mg  20 mg Oral HS    ondansetron (ZOFRAN) injection 4 mg  4 mg Intravenous Q6H PRN    sodium chloride 0 9 % infusion  125 mL/hr Intravenous Continuous    thiamine (VITAMIN B1) tablet 100 mg  100 mg Oral Daily       Allergies   Allergen Reactions    Depakote [Valproic Acid]     Latex     Lithium     Naproxen     Spermaceti Wax     Tramadol            Objective     Blood pressure 111/72, pulse 84, temperature 98 4 °F (36 9 °C), temperature source Temporal, resp  rate 18, height 5' 0 5" (1 537 m), weight 58 3 kg (128 lb 8 5 oz), last menstrual period 10/12/2017, SpO2 93 %  Intake/Output Summary (Last 24 hours) at 10/27/17 0954  Last data filed at 10/27/17 0900   Gross per 24 hour   Intake             1240 ml   Output                0 ml   Net             1240 ml         PHYSICAL EXAM:      General Appearance:   Alert, cooperative, no distress   HEENT:   Normocephalic, atraumatic, anicteric      Neck:  Supple, symmetrical, trachea midline   Lungs:   Clear to auscultation bilaterally; no rales, rhonchi or wheezing; respirations unlabored    Heart[de-identified]   Regular rate and rhythm; no murmur, rub, or gallop     Abdomen:   Soft, non-tender, non-distended; normal bowel sounds; no masses, no organomegaly  Genitalia:   Deferred    Rectal:   Deferred    Extremities:  No cyanosis, clubbing or edema    Pulses:  2+ and symmetric all extremities    Skin:  No jaundice, rashes, or lesions    Lymph nodes:  No palpable cervical lymphadenopathy        Lab Results:   Admission on 10/26/2017   Component Date Value    Amph/Meth UR 10/26/2017 Negative     Barbiturate Ur 10/26/2017 Negative     Benzodiazepine Urine 10/26/2017 Negative     Cocaine Urine 10/26/2017 Negative     Methadone Urine 10/26/2017 Negative     Opiate Urine 10/26/2017 Negative     PCP Ur 10/26/2017 Negative     THC Urine 10/26/2017 Negative     Color, UA 10/26/2017 Light Yellow     Clarity, UA 10/26/2017 Clear     Specific Gravity, UA 10/26/2017 <=1 005     pH, UA 10/26/2017 7 0     Leukocytes, UA 10/26/2017 Negative     Nitrite, UA 10/26/2017 Negative     Protein, UA 10/26/2017 Negative     Glucose, UA 10/26/2017 Negative     Ketones, UA 10/26/2017 Negative     Urobilinogen, UA 10/26/2017 0 2     Bilirubin, UA 10/26/2017 Negative     Blood, UA 10/26/2017 Negative     WBC 10/26/2017 12 01*    RBC 10/26/2017 4 62     Hemoglobin 10/26/2017 14 9     Hematocrit 10/26/2017 42 8     MCV 10/26/2017 93     MCH 10/26/2017 32 3     MCHC 10/26/2017 34 8     RDW 10/26/2017 13 8     MPV 10/26/2017 9 1     Platelets 21/64/3612 264     Neutrophils Relative 10/26/2017 60     Lymphocytes Relative 10/26/2017 30     Monocytes Relative 10/26/2017 7     Eosinophils Relative 10/26/2017 3     Basophils Relative 10/26/2017 0     Neutrophils Absolute 10/26/2017 7 22     Lymphocytes Absolute 10/26/2017 3 65     Monocytes Absolute 10/26/2017 0 78     Eosinophils Absolute 10/26/2017 0 33     Basophils Absolute 10/26/2017 0 03     Sodium 10/26/2017 137     Potassium 10/26/2017 3 4*    Chloride 10/26/2017 99*    CO2 10/26/2017 25     Anion Gap 10/26/2017 13     BUN 10/26/2017 6     Creatinine 10/26/2017 0 60     Glucose 10/26/2017 95     Calcium 10/26/2017 9 0     AST 10/26/2017 20     ALT 10/26/2017 23     Alkaline Phosphatase 10/26/2017 87     Total Protein 10/26/2017 6 9     Albumin 10/26/2017 3 8     Total Bilirubin 10/26/2017 0 20     eGFR 10/26/2017 110     EXT PREG TEST UR (Ref: N* 10/26/2017 negative     Ammonia 10/26/2017 71*    Ethanol Lvl 12/85/2098 532*    Salicylate Lvl 69/55/6657 3 3     Acetaminophen Level 10/26/2017 <2*    WBC 10/27/2017 8 14     RBC 10/27/2017 4 44     Hemoglobin 10/27/2017 14 2     Hematocrit 10/27/2017 42 4     MCV 10/27/2017 96     MCH 10/27/2017 32 0     MCHC 10/27/2017 33 5     RDW 10/27/2017 14 2     Platelets 31/36/6146 269     MPV 10/27/2017 9 5     Sodium 10/27/2017 141     Potassium 10/27/2017 3 8     Chloride 10/27/2017 105     CO2 10/27/2017 24     Anion Gap 10/27/2017 12     BUN 10/27/2017 9     Creatinine 10/27/2017 0 74     Glucose 10/27/2017 92     Calcium 10/27/2017 8 1*    eGFR 10/27/2017 97     Ammonia 10/27/2017 28        Imaging Studies: I have personally reviewed pertinent imaging studies    US 10/17:  Normal liver size, smooth texture

## 2017-10-27 NOTE — H&P
History and Physical - [facility]    Patient Information: Suraj Dorsey 56 y o  female MRN: [id number]  Unit/Bed#: 425-01 Encounter: [id number]  Admitting Physician: Madhavi Monreal MD  PCP: Sergo Haney [address] [place] [address]  Date of Admission:  10/26/17    Assessment/Plan:    Hospital Problem List:     Principal Problem:    Alcohol intoxication ([facility] )  Active Problems:    Schizoaffective disorder ([facility] )    Serum ammonia increased ([facility] )    Suicidal behavior      Plan for the Primary Problem(s):  · Alcohol intoxication  · Alcohol level on admission was 214  Patient does admit to alcohol use daily  She does have a history of alcohol withdrawals  · Patient is on Klonopin daily, will also order Ativan p r n  Maik Leonard · IV normal saline for rehydration at 125 cc/hour  · Folic acid and thiamine replacement  · Monitor patient for signs of clinical withdrawal     Plan for Additional Problems:   · Elevated ammonia level: On previous CT of abdomen patient had an enlarged liver  She has not had further scans since that time  Will consult GI and order right upper quadrant ultrasound for the morning  New ammonia level ordered for the morning  Patient shows no signs of hepatic encephalopathy at this time  Monitor patient overnight for signs of confusion  · Suicidal behavior:  Patient will need medical clearance before seeking placement  Patient will be kept on one-to-one monitoring  · Schizoaffective disorder:  Continue patient on home medications  VTE Prophylaxis: Enoxaparin (Lovenox)  / sequential compression device   Code Status:  Full    Anticipated Length of Stay:  Patient will be admitted on an Inpatient basis with an anticipated length of stay of  greater than 2 midnights     Justification for Hospital Stay:  Alcohol intoxication and suicidal ideations along with elevated ammonia levels    Chief Complaint:   Suicidal ideation    History of Present Illness:    Suraj Dorsey is a 56 y o  female who presents from EMS after her fiance called for her stating that she wanted to kill herself  She has an extensive history of psychiatric problems including schizoaffective disorder, PTSD and multiple suicide attempts in the past   She has been hospitalized in the past but not in over a year  She does see outpatient psychiatry who manages all her medications as well as counseling that she states she sees 3 times weekly  She states she is fairly compliant with her medications, but she does not take them if she has too much to drink during the day  She does state that at least 1 night a week she does not take her evening medications  In the emergency department she told them that people are giving her drugs through osm[facility] and that it did not make her feel and she wanted them stop  She also has multiple complaints including left lower lung pain right lower lung pain which is chronic as well as back pain  She also states that she has had a cough and nasal congestion for the past few days  She states along that she has had fevers and chills, but did not take her temperatures at home  She did have several drinks today including a shot of vodka and multiple beers  She states that she drinks almost every day and drink to get drunk at least once a week  She has been admitted in the past for alcohol withdrawal stating that the last time was 2 years ago  Review of Systems:    Review of Systems   Constitutional: Positive for chills and fever  HENT: Positive for sinus pressure and sneezing  Respiratory: Positive for cough  Pain in lungs   Cardiovascular: Negative for chest pain and palpitations  Gastrointestinal: Positive for nausea and vomiting  Negative for abdominal pain  Genitourinary: Negative for dysuria  Musculoskeletal: Positive for myalgias  Skin: Negative for color change  Neurological: Negative for dizziness and light-headedness  Psychiatric/Behavioral: Positive for suicidal ideas     All other systems reviewed and are negative  Past Medical and Surgical History:     Past Medical History:   Diagnosis Date    DJD (degenerative joint disease)     Pancreatitis     Psychiatric disorder     schizophrenia    [facility]     Sk[facility] fracture ([facility] )        Past Surgical History:   Procedure Laterality Date     SECTION         Meds/Allergies:    Prior to Admission medications    Medication Sig Start Date End Date Taking? Authorizing Provider   benztropine (COGENTIN) 0 5 mg tablet Take by mouth daily at bedtime   Yes Historical Provider, MD   clonazePAM (KlonoPIN) 0 5 mg tablet Take 0 5 mg by mouth 2 (two) times a day     Yes Historical Provider, MD   gabapentin (NEURONTIN) 300 mg capsule Take 400 mg by mouth 4 (four) times a day     Yes Historical Provider, MD   haloperidol (HALDOL) 2 mg tablet Take 2 mg by mouth 3 (three) times a day     Yes Historical Provider, MD   OLANZapine (ZyPREXA) 20 MG tablet Take 20 mg by mouth daily at bedtime   Yes Historical Provider, MD   HYDROcodone-acetaminophen (NORCO) 5-325 mg per tablet 1-2 PO q 4-6 hours as needed for severe pain; contains acetaminophen 6/26/17 10/26/17  Val Anderson MD     I have reviewed home medications with patient personally  Allergies: Allergies   Allergen Reactions    Depakote [Valproic Acid]     Latex     Lithium     Naproxen     Spermaceti Wax     Tramadol        Social History:     Marital Status: Single     Substance Use History:   History   Alcohol Use    Yes     Comment: alot lately     History   Smoking Status    Current Every Day Smoker    Packs/day: 1 00   Smokeless Tobacco    Never Used     History   Drug Use    Types: Marijuana       Family History:    History reviewed  No pertinent family history      Physical Exam:     Vitals:   Blood Pressure: 107/80 (10/26/17 2100)  Pulse: 84 (10/26/17 2100)  Temperature: 98 7 °F (37 1 °C) (10/26/17 2100)  Temp Source: Temporal (10/26/17 2100)  Respirations: 18 (10/26/17 2100)  Height: 5' 0 5" (153 7 cm) (10/26/17 2100)  Weight - Scale: 58 3 kg (128 lb 8 5 oz) (10/26/17 2100)  SpO2: 94 % (10/26/17 2100)    Physical Exam   Constitutional: She is oriented to person, place, and time  She appears well-developed  No distress  HENT:   Head: Normocephalic and atraumatic  Eyes: Conjunctivae and EOM are normal  Pupils are equal, round, and reactive to light  Neck: Normal range of motion  Neck supple  Cardiovascular: Normal rate, regular rhythm, normal heart sounds and intact distal pulses  Exam reveals no gallop  No murmur heard  Pulmonary/Chest: Breath sounds normal  No respiratory distress  She has no wheezes  She has no rales  Abdominal: Soft  Bowel sounds are normal  She exhibits distension  There is no tenderness  There is no rebound  Musculoskeletal: Normal range of motion  She exhibits no edema or tenderness  Neurological: She is alert and oriented to person, place, and time  No cranial nerve deficit  Coordination normal    Skin: Skin is warm and dry  She is not diaphoretic  Additional Data:     Lab Results: I have personally reviewed pertinent reports        Results for orders placed or performed during the hospital encounter of 10/26/17   Rapid drug screen, urine   Result Value Ref Range    Amph/Meth UR Negative Negative    Barbiturate Ur Negative Negative    Benzodiazepine Urine Negative Negative    Cocaine Urine Negative Negative    Methadone Urine Negative Negative    Opiate Urine Negative Negative    PCP Ur Negative Negative    THC Urine Negative Negative   UA w Reflex to Microscopic w Reflex to Culture   Result Value Ref Range    Color, UA Light Yellow     Clarity, UA Clear     Specific Gravity, UA <=1 005 1 003 - 1 030    pH, UA 7 0 4 5 - 8 0    Leukocytes, UA Negative Negative    Nitrite, UA Negative Negative    Protein, UA Negative Negative mg/dl    Glucose, UA Negative Negative mg/dl    Ketones, UA Negative Negative mg/dl    Urobilinogen, UA 0 2 0 2, 1 0 E U /dl E U /dl    Bilirubin, UA Negative Negative    Blood, UA Negative Negative, Trace-Intact   CBC and differential   Result Value Ref Range    WBC 12 01 (H) 4 31 - 10 16 Thousand/uL    RBC 4 62 3 81 - 5 12 Million/uL    Hemoglobin 14 9 11 5 - 15 4 g/dL    Hematocrit 42 8 34 8 - 46 1 %    MCV 93 82 - 98 fL    MCH 32 3 26 8 - 34 3 pg    MCHC 34 8 31 4 - 37 4 g/dL    RDW 13 8 11 6 - 15 1 %    MPV 9 1 8 9 - 12 7 fL    Platelets 686 484 - 187 Thousands/uL    Neutrophils Relative 60 43 - 75 %    Lymphocytes Relative 30 14 - 44 %    Monocytes Relative 7 4 - 12 %    Eosinophils Relative 3 0 - 6 %    Basophils Relative 0 0 - 1 %    Neutrophils Absolute 7 22 1 85 - 7 62 Thousands/µL    Lymphocytes Absolute 3 65 0 60 - 4 47 Thousands/µL    Monocytes Absolute 0 78 0 17 - 1 22 Thousand/µL    Eosinophils Absolute 0 33 0 00 - 0 61 Thousand/µL    Basophils Absolute 0 03 0 00 - 0 10 Thousands/µL   Comprehensive metabolic panel   Result Value Ref Range    Sodium 137 136 - 145 mmol/L    Potassium 3 4 (L) 3 5 - 5 3 mmol/L    Chloride 99 (L) 100 - 108 mmol/L    CO2 25 21 - 32 mmol/L    Anion Gap 13 4 - 13 mmol/L    BUN 6 5 - 25 mg/dL    Creatinine 0 60 0 60 - 1 30 mg/dL    Glucose 95 65 - 140 mg/dL    Calcium 9 0 8 3 - 10 1 mg/dL    AST 20 5 - 45 U/L    ALT 23 12 - 78 U/L    Alkaline Phosphatase 87 46 - 116 U/L    Total Protein 6 9 6 4 - 8 2 g/dL    Albumin 3 8 3 5 - 5 0 g/dL    Total Bilirubin 0 20 0 20 - 1 00 mg/dL    eGFR 110 ml/min/1 73sq m   Ammonia   Result Value Ref Range    Ammonia 71 (H) 11 - 35 umol/L   Ethanol   Result Value Ref Range    Ethanol Lvl 214 (H) 0 - 3 mg/dL   Salicylate level   Result Value Ref Range    Salicylate Lvl 3 3 3 - 20 mg/dL   Acetaminophen level   Result Value Ref Range    Acetaminophen Level <2 (L) 10 - 30 ug/mL   POCT pregnancy, urine   Result Value Ref Range    EXT PREG TEST UR (Ref: Negative) negative          Allscripts / Epic Records Reviewed: Yes     ** Please Note: This note has been constructed using a voice recognition system   **

## 2017-10-27 NOTE — PLAN OF CARE
CONFUSION/THOUGHT DISTURBANCE     Thought disturbances (confusion, delirium, depression, dementia or psychosis) are managed to maintain or return to baseline mental status and functional level Progressing        COPING     Pt/Family able to verbalize concerns and demonstrate effective coping strategies Progressing     Will report anxiety at manageable levels Progressing        DISCHARGE PLANNING     Discharge to home or other facility with appropriate resources Progressing        INFECTION - ADULT     Absence or prevention of progression during hospitalization Progressing     Absence of fever/infection during neutropenic period Progressing        Knowledge Deficit     Patient/family/caregiver demonstrates understanding of disease process, treatment plan, medications, and discharge instructions Progressing        PAIN - ADULT     Verbalizes/displays adequate comfort level or baseline comfort level Progressing        SAFETY ADULT     Patient will remain free of falls Progressing     Maintain or return to baseline ADL function Progressing     Maintain or return mobility status to optimal level Progressing        SELF HARM     Effect of psychiatric condition will be minimized and patient will be protected from self harm Progressing        SUBSTANCE USE/ABUSE     Will have no detox symptoms and will verbalize plan for changing substance-related behavior Progressing     By discharge, will develop insight into their chemical dependency and sustain motivation to continue in recovery Progressing     By discharge, patient will have ongoing treatment plan addressing chemical dependency Progressing

## 2017-10-27 NOTE — SOCIAL WORK
Late Note 10/27/17 1725 Attempted to call Flaco Felix at [facility] [phone] that pt was discharged today but I was unable to reach or leave a message  I will attempt to call on Monday

## 2017-10-27 NOTE — DISCHARGE SUMMARY
Discharge Summary - Ernie Reed 56 y o  female MRN: [id number]  Unit/Bed#: 425-01 Encounter: [id number]    Admission Date: 10/26/2017   Discharge date: 10/27/17    Admitting Diagnosis: Alcohol intoxication ([facility] ) [F10 929]  Serum ammonia increased ([facility] ) [E72 20]  Suicidal behavior [R46 89]  Delusional disorder ([facility] ) [F22]  Altered mental status, unspecified [R41 82]     Discharge diagnosis:   Patient Active Problem List   Diagnosis    Schizoaffective disorder ([facility] )    Alcohol intoxication ([facility] )    Serum ammonia increased ([facility] )    Suicidal behavior       HPI: This patient presented tot he ER under the encouragement of her fiance due to suicidal thoughts  She admitted to drinking alcohol and using marijuana, which she feels worsened her thoughts  She was also found to have an elevated ammonia level of 71  See H&P for full details  Hospital Course: The patient was admitted to med/surg, she was given IV fluid hydration, folic acid and thiamine supplementation  She was evaluated by gastroenterology due to elevated ammonia level and prior history of mild hepatomegaly  Her ammonia level was felt to be nonspecific and normalized on hospital day 2  Abdominal ultrasound was obtained and did not demonstrate any hepatic abnormalities  She was counseled about alcohol cessation  She will be discharged home with prescriptions for both folic acid and thiamine  Psychiatry evaluated the patient via telemedicine communication and determined that the patient was no longer suicidal and no further inpatient psychiatric workup is necessary  She will be discharged home and instructed to visit her PCP within 2 weeks  Imaging studies:   Ultrasound of the right upper quadrant (10/27/17): IMPRESSION:  Echogenic partially visualized pancreas attributed to steatosis    Remainder of the examination is essentially normal     Complications: none    Condition at Discharge: good     Examination on discharge:   Vitals:    10/26/17 1731 10/26/17 2100 10/27/17 0025 10/27/17 0700   BP: 138/91 107/80 111/78 111/72   Pulse: 100 84 86 84   Resp: 16 18 18 18   Temp: 98 1 °F (36 7 °C) 98 7 °F (37 1 °C) 98 2 °F (36 8 °C) 98 4 °F (36 9 °C)   TempSrc: Temporal Temporal Temporal Temporal   SpO2: 96% 94% 90% 93%   Weight: 58 1 kg (128 lb) 58 3 kg (128 lb 8 5 oz)     Height: 5' 0 5" (1 537 m) 5' 0 5" (1 537 m)     Gen: NAD  HEENT: MMM  Lungs: CTA throughout  Heart RRR, no murmurs  Abd: soft, nontender, nondistended  Ext: no edema of the BLE  Psych: alert, oriented, mildly agitated, no overt signs of depression    Discharge instructions/Information to patient and family:   See after visit summary for information provided to patient and family  Provisions for Follow-Up Care:  See after visit summary for information related to follow-up care and any pertinent home health orders  Disposition: Home    Planned Readmission: No    Discharge Statement   I spent 35 minutes discharging the patient  This time was spent on the day of discharge  I examined the patient, discussed this case with nursing, reviewed her medical records, provided patient education and prepared the appropriate paperwork for discharge  Discharge Medications:  See after visit summary for reconciled discharge medications provided to patient and family

## 2017-10-27 NOTE — SOCIAL WORK
Pt is being discharged today   Offered to give pt on outpatient alcohol rehab phone numbers which pt does not want  Offered to set up follow up appts for pt with PCP pt would like to set up herself because she has to get a ride  Case Management reviewed discharge planning process including the following: identifying help that is needed at home, pt's preference for discharge needs and Meds at [facility]  Reviewed with Pt that any member of the healthcare team can answer questions regarding : medications, jmportance of recognizing  Signs and symptoms of any  medical problems  Case Management also encouraged pt to follow up with all recommended appointments after discharge

## 2017-10-27 NOTE — SOCIAL WORK
Met with pt to discuss role as  in helping pt to develop discharge plan and to help pt carry out their plan  Pt lives in an apartment alone  Pt has no steps on the outside or inside   Pt has no DME  Pt is independent with ADL's  Pt 's mom drives her to appts  Pt's PCP is [facility]  Pt uses the [facility] in [address] [facility]  Pt is followed by Yayo Mcknight at [facility] [phone]

## 2017-10-27 NOTE — CASE MANAGEMENT
Initial Clinical Review  Admission: Date/Time/Statement: 10/26/17 @ 1942   Orders Placed This Encounter   Procedures    Inpatient Admission (expected length of stay for this patient is greater than two midnights)     Standing Status:   Standing     Number of Occurrences:   1     Order Specific Question:   Admitting Physician     Answer:   Jaxon Flaherty     Order Specific Question:   Level of Care     Answer:   Med Surg [16]     Order Specific Question:   Estimated length of stay     Answer:   More than 2 Midnights     Order Specific Question:   Certification     Answer:   I certify that inpatient services are medically necessary for this patient for a duration of greater than two midnights  See H&P and MD Progress Notes for additional information about the patient's course of treatment  ED: Date/Time/Mode of Arrival:   ED Arrival Information     Expected Arrival Acuity Means of Arrival Escorted By Service Admission Type    - 10/26/2017 17:25 [facility]    Arrival Complaint    SUICIDAL      Chief Complaint:   Chief Complaint   Patient presents with    Psychiatric Evaluation     Patient states somebody is giving her medications that are making her feel bad  She doen't know who is giving them to her, or wha the medication is  Patient states the medication is causing her to feel like she wants to hurt herself  History of Illness:   Pt arrived via EMS-called by pts boyfriend  Pt here states she has wanted to hurt self "for years" -"nothing new" and denies any plan   Pt also denies HI  States boyfriend called EMS "just so she was safe"  Pt is under psychiatric care and on multiple meds and states she is compliant  Also states sees counseling and staff weekly  Pt relates there "are people " giving her meds thru [facility] and it makes her feel sick and "like death " and they also touch her grossly" " I just want them to stop "    Pt has AOB and mildly slurred speech-pt admits to " 4 big ones today " and states usually "bria two"  Pt denies CP or SOB  No Abd pain  States has been vomiting all day and relates she is hungry- I discussed waiting till N/V treated before attempting intake  Pt denies dark or bloody stool or emesis    Vague about eating today  Pt also relates she is worried about known breast lump and "spot on her left lung" -states both are under evaluation  ED Vital Signs:   ED Triage Vitals [10/26/17 1731]   Temperature Pulse Respirations Blood Pressure SpO2   98 1 °F (36 7 °C) 100 16 138/91 96 %      Temp Source Heart Rate Source Patient Position - Orthostatic VS BP Location FiO2 (%)   Temporal Monitor Lying Left arm --      Pain Score       No Pain        Wt Readings from Last 1 Encounters:   10/26/17 58 3 kg (128 lb 8 5 oz)   Vital Signs (abnormal): Abnormal Labs/Diagnostic Test Results:   ETOH 214   K 3 4 WBC 12 01 AMMONIA 71  ED Treatment:   Medication Administration from 10/26/2017 1725 to 10/26/2017 2033       Date/Time Order Dose Route Action Action by Comments     10/26/2017 1842 ondansetron (ZOFRAN-ODT) dispersible tablet 4 mg 4 mg Oral Given Chon Rivas RN       Past Medical/Surgical History:    Active Ambulatory Problems     Diagnosis Date Noted    No Active Ambulatory Problems     Resolved Ambulatory Problems     Diagnosis Date Noted    No Resolved Ambulatory Problems     Past Medical History:   Diagnosis Date    DJD (degenerative joint disease)     Pancreatitis     Psychiatric disorder     Schizoaffective disorder ([facility] )     Skull fracture (HCC)    Admitting Diagnosis: Alcohol intoxication ([place] [address] ) [F10 929]  Serum ammonia increased ([facility] ) [E72 20]  Suicidal behavior [R46 89]  Delusional disorder ([facility] ) [F22]  Altered mental status, unspecified [R41 82]  Age/Sex: 56 y o  female  Assessment/Plan:   Principal Problem:    Alcohol intoxication ([facility] )  Active Problems:    Schizoaffective disorder (HCC)    Serum ammonia increased [facility])    Suicidal behavior  Plan for the Primary Problem(s):  · Alcohol intoxication  ? Alcohol level on admission was 214  Patient does admit to alcohol use daily  She does have a history of alcohol withdrawals  ? Patient is on Klonopin daily, will also order Ativan p r n  Edel Schreiber ? IV normal saline for rehydration at 125 cc/hour  ? Folic acid and thiamine replacement  ? Monitor patient for signs of clinical withdrawal   Plan for Additional Problems:   · Elevated ammonia level: On previous CT of abdomen patient had an enlarged liver  She has not had further scans since that time  Will consult GI and order right upper quadrant ultrasound for the morning  New ammonia level ordered for the morning  Patient shows no signs of hepatic encephalopathy at this time  Monitor patient overnight for signs of confusion  · Suicidal behavior:  Patient will need medical clearance before seeking placement  Patient will be kept on one-to-one monitoring  · Schizoaffective disorder:  Continue patient on home medications    Anticipated Length of Stay:  Patient will be admitted on an Inpatient basis with an anticipated length of stay of  greater than 2 midnights     Justification for Hospital Stay:  Alcohol intoxication and suicidal ideations along with elevated ammonia levels  Admission Orders:  MED SURG  CONSULT GI  BLE VENODYNES  Scheduled Meds:   benztropine 0 5 mg Oral HS   clonazePAM 0 5 mg Oral BID   enoxaparin 40 mg Subcutaneous Daily   folic acid IVPB 1 mg Intravenous Daily   gabapentin 400 mg Oral 4x Daily   haloperidol 2 mg Oral TID   nicotine 1 patch Transdermal Daily   OLANZapine 20 mg Oral HS   thiamine 100 mg Oral Daily   Continuous Infusions:   sodium chloride 125 mL/hr Last Rate: 125 mL/hr (10/27/17 0609)   PRN Meds:   acetaminophen    LORazepam    ondansetron  St  [address] [place] [address] in the [facility] by Jamari Mota for 2017  Network Utilization Review Department  Phone: [phone]; Fax [phone]  ATTENTION: The Network Utilization Review Department is now centralized for our 7 Facilities  Make a note that we have a new phone and fax numbers for our Department  Please call with any questions or concerns to [phone] and carefully follow the prompts so that you are directed to the right person  All voicemails are confidential  Fax any determinations, approvals, denials, and requests for initial or continue stay review clinical to [phone]  Due to HIGH CALL volume, it would be easier if you could please send faxed requests to expedite your requests and in part, help us provide discharge notifications faster

## 2017-10-27 NOTE — PLAN OF CARE
Problem: COPING  Goal: Pt/Family able to verbalize concerns and demonstrate effective coping strategies  INTERVENTIONS:  - Assist patient/family to identify coping skills, available support systems and cultural and spiritual values  - Provide emotional support, including active listening and acknowledgement of concerns of patient and caregivers  - Reduce environmental stimuli, as able  - Provide patient education  - Assess for spiritual pain/suffering and initiate spiritual care, including notification of Pastoral Care or ayanna based community as needed  - Assess effectiveness of coping strategies   Outcome: Progressing    Goal: Will report anxiety at manageable levels  INTERVENTIONS:  - Administer medication as ordered  - Teach and encourage coping skills  - Provide emotional support  - Assess patient/family for anxiety and ability to cope   Outcome: Progressing      Problem: CONFUSION/THOUGHT DISTURBANCE  Goal: Thought disturbances (confusion, delirium, depression, dementia or psychosis) are managed to maintain or return to baseline mental status and functional level  INTERVENTIONS:  - Assess for possible contributors to  thought disturbance, including but not limited to medications, infection, impaired vision or hearing, underlying metabolic abnormalities, dehydration, respiratory compromise,  psychiatric diagnoses and notify attending PHYSICAN/AP  - Monitor and intervene to maintain adequate nutrition, hydration, elimination, sleep and activity  - Decrease environmental stimuli, including noise as appropriate  - Provide frequent contacts to provide refocusing, direction and reassurance as needed  Approach patient calmly with eye contact and at their level    - [place] high risk fall precautions, aspiration precautions and other safety measures, as indicated  - If delirium suspected, notify physician/AP of change in condition and request immediate in-person evaluation  - Pursue consults as appropriate including Geriatric (campus dependent), OT for cognitive evaluation/activity planning, psychiatric, pastoral care, etc    Outcome: Progressing      Problem: SELF HARM  Goal: Effect of psychiatric condition will be minimized and patient will be protected from self harm  INTERVENTIONS:  - Assess impact of patient's symptoms on level of functioning, self-care needs and offer support as indicated  - Assess patient/family knowledge of depression, impact on illness and need for teaching  - Provide emotional support, presence and reassurance  - Assess for possible suicidal thoughts, ideation or self-harm  If patient expresses suicidal thoughts or statements do not leave alone, notify physician/AP immediately, initiate suicide precautions, and determine need for continual observation    - initiate consults and referrals as appropriate (a mental health professional, Spiritual Care   Outcome: Progressing      Problem: SUBSTANCE USE/ABUSE  Goal: Will have no detox symptoms and will verbalize plan for changing substance-related behavior  INTERVENTIONS:  - Monitor physical status and assess for symptoms of withdrawal  - Administer medication as ordered  - Provide emotional support with 1 on 1 interaction with staff  - Encourage recovery focused program/ addiction education  - Assess for verbalization of changing behaviors related to substance abuse  - Initiate consults and referrals as appropriate (Case Management, Spiritual Care, etc )   Outcome: Progressing    Goal: By discharge, will develop insight into their chemical dependency and sustain motivation to continue in recovery  INTERVENTIONS:  - Attends all daily group sessions and scheduled AA groups  - Actively practices coping skills through participation in the therapeutic community and adherence to program rules  - Reviews and completes assignments from individual treatment plan  - Assist patient development of understanding of their personal cycle of addiction and relapse triggers Outcome: Progressing    Goal: By discharge, patient will have ongoing treatment plan addressing chemical dependency  INTERVENTIONS:  - Assist patient with resources and/or appointments for ongoing recovery based living   Outcome: Progressing      Problem: PAIN - ADULT  Goal: Verbalizes/displays adequate comfort level or baseline comfort level  Interventions:  - Encourage patient to monitor pain and request assistance  - Assess pain using appropriate pain scale  - Administer analgesics based on type and severity of pain and evaluate response  - Implement non-pharmacological measures as appropriate and evaluate response  - Consider cultural and social influences on pain and pain management  - Notify physician/advanced practitioner if interventions unsuccessful or patient reports new pain   Outcome: Progressing      Problem: INFECTION - ADULT  Goal: Absence or prevention of progression during hospitalization  INTERVENTIONS:  - Assess and monitor for signs and symptoms of infection  - Monitor lab/diagnostic results  - Monitor all insertion sites, i e  indwelling lines, tubes, and drains  - Monitor endotracheal (as able) and nasal secretions for changes in amount and color  - [place] appropriate cooling/warming therapies per order  - Administer medications as ordered  - Instruct and encourage patient and family to use good hand hygiene technique  - Identify and instruct in appropriate isolation precautions for identified infection/condition   Outcome: Progressing    Goal: Absence of fever/infection during neutropenic period  INTERVENTIONS:  - Monitor WBC  - Implement neutropenic guidelines   Outcome: Progressing      Problem: SAFETY ADULT  Goal: Patient will remain free of falls  INTERVENTIONS:  - Assess patient frequently for physical needs  -  Identify cognitive and physical deficits and behaviors that affect risk of falls    -  [place] fall precautions as indicated by assessment   - Educate patient/family on patient safety including physical limitations  - Instruct patient to call for assistance with activity based on assessment  - Modify environment to reduce risk of injury  - Consider OT/PT consult to assist with strengthening/mobility   Outcome: Progressing    Goal: Maintain or return to baseline ADL function  INTERVENTIONS:  -  Assess patient's ability to carry out ADLs; assess patient's baseline for ADL function and identify physical deficits which impact ability to perform ADLs (bathing, care of mouth/teeth, toileting, grooming, dressing, etc )  - Assess/evaluate cause of self-care deficits   - Assess range of motion  - Assess patient's mobility; develop plan if impaired  - Assess patient's need for assistive devices and provide as appropriate  - Encourage maximum independence but intervene and supervise when necessary  ¯ Involve family in performance of ADLs  ¯ Assess for home care needs following discharge   ¯ Request OT consult to assist with ADL evaluation and planning for discharge  ¯ Provide patient education as appropriate   Outcome: Progressing    Goal: Maintain or return mobility status to optimal level  INTERVENTIONS:  - Assess patient's baseline mobility status (ambulation, transfers, stairs, etc )    - Identify cognitive and physical deficits and behaviors that affect mobility  - Identify mobility aids required to assist with transfers and/or ambulation (gait belt, sit-to-stand, lift, walker, cane, etc )  - [place] fall precautions as indicated by assessment  - Record patient progress and toleration of activity level on Mobility SBAR; progress patient to next Phase/Stage  - Instruct patient to call for assistance with activity based on assessment  - Request Rehabilitation consult to assist with strengthening/weightbearing, etc    Outcome: Progressing      Problem: DISCHARGE PLANNING  Goal: Discharge to home or other facility with appropriate resources  INTERVENTIONS:  - Identify barriers to discharge w/patient and caregiver  - Arrange for needed discharge resources and transportation as appropriate  - Identify discharge learning needs (meds, wound care, etc )  - Arrange for interpretive services to assist at discharge as needed  - Refer to Case Management Department for coordinating discharge planning if the patient needs post-hospital services based on physician/advanced practitioner order or complex needs related to functional status, cognitive ability, or social support system   Outcome: Progressing      Problem: Knowledge Deficit  Goal: Patient/family/caregiver demonstrates understanding of disease process, treatment plan, medications, and discharge instructions  Complete learning assessment and assess knowledge base    Interventions:  - Provide teaching at level of understanding  - Provide teaching via preferred learning methods   Outcome: Progressing

## 2017-10-27 NOTE — PROGRESS NOTES
Per nursing supervisor, Nithya Davila, patient's rings and medications are in the nursing office   meds will need to be taken to pharmacy in the AM

## 2017-10-31 ENCOUNTER — APPOINTMENT (OUTPATIENT)
Dept: LAB | Facility: HOSPITAL | Age: 47
End: 2017-10-31
Payer: COMMERCIAL

## 2017-10-31 ENCOUNTER — GENERIC CONVERSION - ENCOUNTER (OUTPATIENT)
Dept: OTHER | Facility: OTHER | Age: 47
End: 2017-10-31

## 2017-10-31 ENCOUNTER — HOSPITAL ENCOUNTER (OUTPATIENT)
Dept: ULTRASOUND IMAGING | Facility: HOSPITAL | Age: 47
Discharge: HOME | End: 2017-10-31
Payer: COMMERCIAL

## 2017-10-31 ENCOUNTER — TRANSCRIBE ORDERS (OUTPATIENT)
Dept: ADMINISTRATIVE | Facility: HOSPITAL | Age: 47
End: 2017-10-31

## 2017-10-31 DIAGNOSIS — R10.32 LEFT LOWER QUADRANT PAIN: ICD-10-CM

## 2017-10-31 DIAGNOSIS — Z00.00 ENCOUNTER FOR GENERAL ADULT MEDICAL EXAMINATION WITHOUT ABNORMAL FINDINGS: ICD-10-CM

## 2017-10-31 LAB
ALBUMIN SERPL BCP-MCNC: 3.5 G/DL (ref 3.5–5)
ALP SERPL-CCNC: 86 U/L (ref 46–116)
ALT SERPL W P-5'-P-CCNC: 16 U/L (ref 12–78)
ANION GAP SERPL CALCULATED.3IONS-SCNC: 8 MMOL/L (ref 4–13)
AST SERPL W P-5'-P-CCNC: 19 U/L (ref 5–45)
BILIRUB SERPL-MCNC: 0.2 MG/DL (ref 0.2–1)
BUN SERPL-MCNC: 9 MG/DL (ref 5–25)
CALCIUM SERPL-MCNC: 9 MG/DL (ref 8.3–10.1)
CHLORIDE SERPL-SCNC: 101 MMOL/L (ref 100–108)
CHOLEST SERPL-MCNC: 245 MG/DL (ref 50–200)
CO2 SERPL-SCNC: 28 MMOL/L (ref 21–32)
CREAT SERPL-MCNC: 0.68 MG/DL (ref 0.6–1.3)
ERYTHROCYTE [DISTWIDTH] IN BLOOD BY AUTOMATED COUNT: 14.4 % (ref 11.6–15.1)
GFR SERPL CREATININE-BSD FRML MDRD: 105 ML/MIN/1.73SQ M
GLUCOSE P FAST SERPL-MCNC: 94 MG/DL (ref 65–99)
HCT VFR BLD AUTO: 44.8 % (ref 34.8–46.1)
HDLC SERPL-MCNC: 54 MG/DL (ref 40–60)
HGB BLD-MCNC: 14.7 G/DL (ref 11.5–15.4)
LDLC SERPL CALC-MCNC: 123 MG/DL (ref 0–100)
MCH RBC QN AUTO: 31.5 PG (ref 26.8–34.3)
MCHC RBC AUTO-ENTMCNC: 32.8 G/DL (ref 31.4–37.4)
MCV RBC AUTO: 96 FL (ref 82–98)
PLATELET # BLD AUTO: 329 THOUSANDS/UL (ref 149–390)
PMV BLD AUTO: 9.1 FL (ref 8.9–12.7)
POTASSIUM SERPL-SCNC: 4.7 MMOL/L (ref 3.5–5.3)
PROT SERPL-MCNC: 6.7 G/DL (ref 6.4–8.2)
RBC # BLD AUTO: 4.67 MILLION/UL (ref 3.81–5.12)
SODIUM SERPL-SCNC: 137 MMOL/L (ref 136–145)
TRIGL SERPL-MCNC: 338 MG/DL
TSH SERPL DL<=0.05 MIU/L-ACNC: 1.88 UIU/ML (ref 0.36–3.74)
WBC # BLD AUTO: 9.67 THOUSAND/UL (ref 4.31–10.16)

## 2017-10-31 PROCEDURE — 36415 COLL VENOUS BLD VENIPUNCTURE: CPT

## 2017-10-31 PROCEDURE — 80053 COMPREHEN METABOLIC PANEL: CPT

## 2017-10-31 PROCEDURE — 76705 ECHO EXAM OF ABDOMEN: CPT

## 2017-10-31 PROCEDURE — 80061 LIPID PANEL: CPT

## 2017-10-31 PROCEDURE — 84443 ASSAY THYROID STIM HORMONE: CPT

## 2017-10-31 PROCEDURE — 85027 COMPLETE CBC AUTOMATED: CPT

## 2017-11-24 ENCOUNTER — HOSPITAL ENCOUNTER (EMERGENCY)
Facility: HOSPITAL | Age: 47
Discharge: HOME | End: 2017-11-24
Admitting: EMERGENCY MEDICINE
Payer: COMMERCIAL

## 2017-11-24 ENCOUNTER — APPOINTMENT (EMERGENCY)
Dept: CT IMAGING | Facility: HOSPITAL | Age: 47
End: 2017-11-24
Payer: COMMERCIAL

## 2017-11-24 VITALS
HEIGHT: 61 IN | HEART RATE: 99 BPM | RESPIRATION RATE: 16 BRPM | WEIGHT: 130 LBS | OXYGEN SATURATION: 97 % | BODY MASS INDEX: 24.55 KG/M2 | TEMPERATURE: 98 F | SYSTOLIC BLOOD PRESSURE: 146 MMHG | DIASTOLIC BLOOD PRESSURE: 84 MMHG

## 2017-11-24 DIAGNOSIS — R10.9 ABDOMINAL PAIN: Primary | ICD-10-CM

## 2017-11-24 LAB
ALBUMIN SERPL BCP-MCNC: 3.6 G/DL (ref 3.5–5)
ALP SERPL-CCNC: 100 U/L (ref 46–116)
ALT SERPL W P-5'-P-CCNC: 41 U/L (ref 12–78)
ANION GAP SERPL CALCULATED.3IONS-SCNC: 12 MMOL/L (ref 4–13)
AST SERPL W P-5'-P-CCNC: 32 U/L (ref 5–45)
BASOPHILS # BLD AUTO: 0.04 THOUSANDS/ΜL (ref 0–0.1)
BASOPHILS NFR BLD AUTO: 0 % (ref 0–1)
BILIRUB SERPL-MCNC: 0.2 MG/DL (ref 0.2–1)
BILIRUB UR QL STRIP: NEGATIVE
BUN SERPL-MCNC: 14 MG/DL (ref 5–25)
CALCIUM SERPL-MCNC: 9.1 MG/DL (ref 8.3–10.1)
CHLORIDE SERPL-SCNC: 96 MMOL/L (ref 100–108)
CLARITY UR: CLEAR
CO2 SERPL-SCNC: 27 MMOL/L (ref 21–32)
COLOR UR: YELLOW
CREAT SERPL-MCNC: 0.85 MG/DL (ref 0.6–1.3)
EOSINOPHIL # BLD AUTO: 0.28 THOUSAND/ΜL (ref 0–0.61)
EOSINOPHIL NFR BLD AUTO: 2 % (ref 0–6)
ERYTHROCYTE [DISTWIDTH] IN BLOOD BY AUTOMATED COUNT: 13.5 % (ref 11.6–15.1)
EXT PREG TEST URINE: NEGATIVE
GFR SERPL CREATININE-BSD FRML MDRD: 82 ML/MIN/1.73SQ M
GLUCOSE SERPL-MCNC: 113 MG/DL (ref 65–140)
GLUCOSE UR STRIP-MCNC: NEGATIVE MG/DL
HCT VFR BLD AUTO: 43.2 % (ref 34.8–46.1)
HGB BLD-MCNC: 14.9 G/DL (ref 11.5–15.4)
HGB UR QL STRIP.AUTO: NEGATIVE
KETONES UR STRIP-MCNC: NEGATIVE MG/DL
LEUKOCYTE ESTERASE UR QL STRIP: NEGATIVE
LIPASE SERPL-CCNC: 153 U/L (ref 73–393)
LYMPHOCYTES # BLD AUTO: 3.47 THOUSANDS/ΜL (ref 0.6–4.47)
LYMPHOCYTES NFR BLD AUTO: 22 % (ref 14–44)
MCH RBC QN AUTO: 32.4 PG (ref 26.8–34.3)
MCHC RBC AUTO-ENTMCNC: 34.5 G/DL (ref 31.4–37.4)
MCV RBC AUTO: 94 FL (ref 82–98)
MONOCYTES # BLD AUTO: 1.12 THOUSAND/ΜL (ref 0.17–1.22)
MONOCYTES NFR BLD AUTO: 7 % (ref 4–12)
NEUTROPHILS # BLD AUTO: 10.74 THOUSANDS/ΜL (ref 1.85–7.62)
NEUTS SEG NFR BLD AUTO: 69 % (ref 43–75)
NITRITE UR QL STRIP: NEGATIVE
PH UR STRIP.AUTO: 6 [PH] (ref 4.5–8)
PLATELET # BLD AUTO: 258 THOUSANDS/UL (ref 149–390)
PMV BLD AUTO: 9.1 FL (ref 8.9–12.7)
POTASSIUM SERPL-SCNC: 3.7 MMOL/L (ref 3.5–5.3)
PROT SERPL-MCNC: 7.3 G/DL (ref 6.4–8.2)
PROT UR STRIP-MCNC: NEGATIVE MG/DL
RBC # BLD AUTO: 4.6 MILLION/UL (ref 3.81–5.12)
SODIUM SERPL-SCNC: 135 MMOL/L (ref 136–145)
SP GR UR STRIP.AUTO: 1.01 (ref 1–1.03)
UROBILINOGEN UR QL STRIP.AUTO: 0.2 E.U./DL
WBC # BLD AUTO: 15.65 THOUSAND/UL (ref 4.31–10.16)

## 2017-11-24 PROCEDURE — 81003 URINALYSIS AUTO W/O SCOPE: CPT | Performed by: PHYSICIAN ASSISTANT

## 2017-11-24 PROCEDURE — 74177 CT ABD & PELVIS W/CONTRAST: CPT

## 2017-11-24 PROCEDURE — 81025 URINE PREGNANCY TEST: CPT | Performed by: PHYSICIAN ASSISTANT

## 2017-11-24 PROCEDURE — 83690 ASSAY OF LIPASE: CPT | Performed by: PHYSICIAN ASSISTANT

## 2017-11-24 PROCEDURE — 36415 COLL VENOUS BLD VENIPUNCTURE: CPT | Performed by: PHYSICIAN ASSISTANT

## 2017-11-24 PROCEDURE — 96361 HYDRATE IV INFUSION ADD-ON: CPT

## 2017-11-24 PROCEDURE — 80053 COMPREHEN METABOLIC PANEL: CPT | Performed by: PHYSICIAN ASSISTANT

## 2017-11-24 PROCEDURE — 85025 COMPLETE CBC W/AUTO DIFF WBC: CPT | Performed by: PHYSICIAN ASSISTANT

## 2017-11-24 PROCEDURE — 96374 THER/PROPH/DIAG INJ IV PUSH: CPT

## 2017-11-24 PROCEDURE — 99284 EMERGENCY DEPT VISIT MOD MDM: CPT

## 2017-11-24 RX ORDER — MORPHINE SULFATE 4 MG/ML
4 INJECTION, SOLUTION INTRAMUSCULAR; INTRAVENOUS ONCE
Status: DISCONTINUED | OUTPATIENT
Start: 2017-11-24 | End: 2017-11-24

## 2017-11-24 RX ORDER — MORPHINE SULFATE 4 MG/ML
4 INJECTION, SOLUTION INTRAMUSCULAR; INTRAVENOUS EVERY 4 HOURS PRN
Status: DISCONTINUED | OUTPATIENT
Start: 2017-11-24 | End: 2017-11-24

## 2017-11-24 RX ORDER — ONDANSETRON 4 MG/1
4 TABLET, FILM COATED ORAL EVERY 8 HOURS PRN
Qty: 20 TABLET | Refills: 0 | Status: SHIPPED | OUTPATIENT
Start: 2017-11-24 | End: 2018-06-28 | Stop reason: ALTCHOICE

## 2017-11-24 RX ADMIN — MORPHINE SULFATE 4 MG: 4 INJECTION, SOLUTION INTRAMUSCULAR; INTRAVENOUS at 15:49

## 2017-11-24 RX ADMIN — SODIUM CHLORIDE 1000 ML: 0.9 INJECTION, SOLUTION INTRAVENOUS at 14:34

## 2017-11-24 RX ADMIN — IOHEXOL 100 ML: 350 INJECTION, SOLUTION INTRAVENOUS at 15:48

## 2017-11-24 NOTE — DISCHARGE INSTRUCTIONS

## 2017-11-24 NOTE — ED NOTES
Report received from [facility], RN       Yasmine Morales RN  11/24/17 6573

## 2017-11-24 NOTE — ED NOTES
Patient transported to CT at this time, will medicate with pain meds upon return to ED       Bailey Cruz RN  11/24/17 9820

## 2017-11-24 NOTE — ED PROVIDER NOTES
History  Chief Complaint   Patient presents with    Abdominal Pain     intermitten sharp andominal pain     Via private vehicle alone offering a chief complaint of abdominal pain that commenced this morning when she woke up  States it is sharp and constant in the mid abdomen  Patient states she had 1 episode of nausea and vomiting  Vomiting was nondescript not containing black or red contents  She denies any abnormal bowel movements  Patient states that her last bowel movement was brown solid  She denies chest pain or shortness of breath  She does state that she has had low-grade fevers over the last 2 days  Prior to Admission Medications   Prescriptions Last Dose Informant Patient Reported? Taking? OLANZapine (ZyPREXA) 20 MG tablet   Yes No   Sig: Take 20 mg by mouth daily at bedtime   benztropine (COGENTIN) 0 5 mg tablet   Yes No   Sig: Take by mouth daily at bedtime   clonazePAM (KlonoPIN) 0 5 mg tablet   Yes No   Sig: Take 0 5 mg by mouth 2 (two) times a day     folic acid (FOLVITE) 1 mg tablet   No No   Sig: Take 1 tablet by mouth daily   gabapentin (NEURONTIN) 300 mg capsule   Yes No   Sig: Take 400 mg by mouth 4 (four) times a day     haloperidol (HALDOL) 2 mg tablet   Yes No   Sig: Take 2 mg by mouth 3 (three) times a day     thiamine 100 MG tablet   No No   Sig: Take 1 tablet by mouth daily      Facility-Administered Medications: None       Past Medical History:   Diagnosis Date    DJD (degenerative joint disease)     Pancreatitis     Psychiatric disorder     schizophrenia    PTSD (post-traumatic stress disorder)     [facility] ([facility] )     [facility]        Past Surgical History:   Procedure Laterality Date     SECTION         History reviewed  No pertinent family history  I have reviewed and agree with the history as documented      Social History   Substance Use Topics    Smoking status: Current Every Day Smoker     Packs/day: 1 00    Smokeless tobacco: Never Used    Alcohol use Yes      Comment: alot lately        Review of Systems   Constitutional: Positive for fever  Negative for fatigue  HENT: Negative  Eyes: Negative  Respiratory: Negative  Cardiovascular: Negative  Gastrointestinal: Positive for abdominal pain, nausea and vomiting  Negative for blood in stool and diarrhea  Endocrine: Negative  Genitourinary: Negative  Musculoskeletal: Negative  Skin: Negative  Allergic/Immunologic: Negative  Neurological: Negative  Hematological: Negative  Psychiatric/Behavioral: Negative  All other systems reviewed and are negative  Physical Exam  ED Triage Vitals [11/24/17 1341]   Temperature Pulse Respirations Blood Pressure SpO2   98 °F (36 7 °C) (!) 114 18 (!) 148/107 94 %      Temp Source Heart Rate Source Patient Position - Orthostatic VS BP Location FiO2 (%)   Temporal Monitor Lying Right arm --      Pain Score       9           Orthostatic Vital Signs  Vitals:    11/24/17 1341 11/24/17 1400 11/24/17 1500 11/24/17 1600   BP: (!) 148/107 138/88 134/86 146/84   Pulse: (!) 114 (!) 114 101 99   Patient Position - Orthostatic VS: Lying   Lying       Physical Exam   Constitutional: She is oriented to person, place, and time  She appears well-developed and well-nourished  No distress  HENT:   Head: Normocephalic and atraumatic  Eyes: EOM are normal  Pupils are equal, round, and reactive to light  Neck: Normal range of motion  Cardiovascular: Normal rate  Pulmonary/Chest: Effort normal    Abdominal: Soft  Bowel sounds are normal  She exhibits distension  She exhibits no mass  There is tenderness  There is no rebound and no guarding  No hernia  Musculoskeletal: Normal range of motion  She exhibits no edema, tenderness or deformity  Neurological: She is alert and oriented to person, place, and time  Skin: Skin is warm  Capillary refill takes less than 2 seconds  She is not diaphoretic     Psychiatric: She has a normal mood and affect  Vitals reviewed  ED Medications  Medications   sodium chloride 0 9 % bolus 1,000 mL (0 mL Intravenous Stopped 11/24/17 1551)   iohexol (OMNIPAQUE) 350 MG/ML injection (SINGLE-DOSE) 100 mL (100 mL Intravenous Given 11/24/17 1548)       Diagnostic Studies  Results Reviewed     Procedure Component Value Units Date/Time    UA w Reflex to Microscopic w Reflex to Culture [[id number]] Collected:  11/24/17 1551    Lab Status:  Final result Specimen:  Urine from Urine, Clean Catch Updated:  11/24/17 1557     Color, UA Yellow     Clarity, UA Clear     Specific Gravity, UA 1 010     pH, UA 6 0     Leukocytes, UA Negative     Nitrite, UA Negative     Protein, UA Negative mg/dl      Glucose, UA Negative mg/dl      Ketones, UA Negative mg/dl      Urobilinogen, UA 0 2 E U /dl      Bilirubin, UA Negative     Blood, UA Negative    POCT pregnancy, urine [[id number]]  (Normal) Resulted:  11/24/17 1537    Lab Status:  Final result Updated:  11/24/17 1537     EXT PREG TEST UR (Ref: Negative) NEGATIVE    Comprehensive metabolic panel [[id number]]  (Abnormal) Collected:  11/24/17 1428    Lab Status:  Final result Specimen:  Blood from Arm, Left Updated:  11/24/17 1500     Sodium 135 (L) mmol/L      Potassium 3 7 mmol/L      Chloride 96 (L) mmol/L      CO2 27 mmol/L      Anion Gap 12 mmol/L      BUN 14 mg/dL      Creatinine 0 85 mg/dL      Glucose 113 mg/dL      Calcium 9 1 mg/dL      AST 32 U/L      ALT 41 U/L      Alkaline Phosphatase 100 U/L      Total Protein 7 3 g/dL      Albumin 3 6 g/dL      Total Bilirubin 0 20 mg/dL      eGFR 82 ml/min/1 73sq m     Narrative:         National Kidney Disease Education Program recommendations are as follows:  GFR calculation is accurate only with a steady state creatinine  Chronic Kidney disease less than 60 ml/min/1 73 sq  meters  Kidney failure less than 15 ml/min/1 73 sq  meters      Lipase [[id number]]  (Normal) Collected:  11/24/17 1428    Lab Status:  Final result Specimen:  Blood from Arm, Left Updated:  11/24/17 1500     Lipase 153 u/L     CBC and differential [[id number]]  (Abnormal) Collected:  11/24/17 1428    Lab Status:  Final result Specimen:  Blood from Arm, Left Updated:  11/24/17 1435     WBC 15 65 (H) Thousand/uL      RBC 4 60 Million/uL      Hemoglobin 14 9 g/dL      Hematocrit 43 2 %      MCV 94 fL      MCH 32 4 pg      MCHC 34 5 g/dL      RDW 13 5 %      MPV 9 1 fL      Platelets 401 Thousands/uL      Neutrophils Relative 69 %      Lymphocytes Relative 22 %      Monocytes Relative 7 %      Eosinophils Relative 2 %      Basophils Relative 0 %      Neutrophils Absolute 10 74 (H) Thousands/µL      Lymphocytes Absolute 3 47 Thousands/µL      Monocytes Absolute 1 12 Thousand/µL      Eosinophils Absolute 0 28 Thousand/µL      Basophils Absolute 0 04 Thousands/µL                  CT abdomen pelvis with contrast   Final Result by Som Nuno MD (11/24 1606)      No acute abdominopelvic findings  Workstation performed: [id number]                    Procedures  Procedures       Phone Contacts  ED Phone Contact    ED Course  ED Course as of Nov 24 1632 Fri Nov 24, 2017   1437 WBC: (!) 15 65   1437 Hemoglobin: 14 9   1437 Platelets: 972   4120 Lipase: 153   1553 EXT PREG TEST UR (Ref: Negative): NEGATIVE   1553 eGFR: 82   1553 Creatinine: 0 85   1553 Sodium: (!) 135   1617 Blood, UA: Negative   1617 Nitrite, UA: Negative   1617 Leukocytes, UA: Negative   1627 Patient was re-evaluated at 1620 hours  Patient states at this time she is not experiencing any pain however upon further questioning she does state that after eating earlier today she did experience increased pain  Although the CT scan currently is unremarkable, I did recommend an ultrasound of the gallbladder however patient states she is refusing at this time because she has to go home  She states she will return if any new symptoms or worsening of symptoms is noted  MDM  CritCare Time    Disposition  Final diagnoses:   Abdominal pain     Time reflects when diagnosis was documented in both MDM as applicable and the Disposition within this note     Time User Action Codes Description Comment    11/24/2017  4:20 PM Zain THORNE Add [R10 9] Abdominal pain       ED Disposition     ED Disposition Condition Comment    Discharge  [facility] discharge to home/self care  Condition at discharge: Good        Follow-up Information     Follow up With Specialties Details Why Contact Info Additional Information    Giovani Laguna MD Gastroenterology Call  [address] Emergency Department Emergency Medicine  If symptoms worsen Vedahilda Ness Daniel 1946  [phone] [place] ED, [address], [facility]        Patient's Medications   Discharge Prescriptions    ONDANSETRON (ZOFRAN) 4 MG TABLET    Take 1 tablet by mouth every 8 (eight) hours as needed for nausea or vomiting       Start Date: 11/24/2017End Date: --       Order Dose: 4 mg       Quantity: 20 tablet    Refills: 0     No discharge procedures on file      ED Provider  Electronically Signed by           Chris Hernandez PA-C  11/24/17 [address] [facility] [facility] Michel Kyle PA-C  11/24/17 3553

## 2017-12-20 ENCOUNTER — TRANSCRIBE ORDERS (OUTPATIENT)
Dept: ADMINISTRATIVE | Facility: HOSPITAL | Age: 47
End: 2017-12-20

## 2017-12-20 ENCOUNTER — APPOINTMENT (OUTPATIENT)
Dept: LAB | Facility: MEDICAL CENTER | Age: 47
End: 2017-12-20
Payer: COMMERCIAL

## 2017-12-20 ENCOUNTER — ALLSCRIPTS OFFICE VISIT (OUTPATIENT)
Dept: OTHER | Facility: OTHER | Age: 47
End: 2017-12-20

## 2017-12-20 ENCOUNTER — TRANSCRIBE ORDERS (OUTPATIENT)
Dept: RADIOLOGY | Facility: MEDICAL CENTER | Age: 47
End: 2017-12-20

## 2017-12-20 DIAGNOSIS — E53.9 VITAMIN B DEFICIENCY: ICD-10-CM

## 2017-12-20 DIAGNOSIS — Z87.898 PERSONAL HISTORY OF OTHER SPECIFIED CONDITIONS (CODE): ICD-10-CM

## 2017-12-20 DIAGNOSIS — R10.31 ABDOMINAL PAIN, RIGHT LOWER QUADRANT: Primary | ICD-10-CM

## 2017-12-20 LAB
FOLATE SERPL-MCNC: >20 NG/ML (ref 3.1–17.5)
VIT B12 SERPL-MCNC: 883 PG/ML (ref 100–900)

## 2017-12-20 PROCEDURE — 84207 ASSAY OF VITAMIN B-6: CPT

## 2017-12-20 PROCEDURE — 36415 COLL VENOUS BLD VENIPUNCTURE: CPT

## 2017-12-20 PROCEDURE — 84425 ASSAY OF VITAMIN B-1: CPT

## 2017-12-20 PROCEDURE — 82746 ASSAY OF FOLIC ACID SERUM: CPT

## 2017-12-20 PROCEDURE — 82607 VITAMIN B-12: CPT
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
## 2017-12-24 LAB — VIT B6 SERPL-MCNC: 9.5 UG/L (ref 2–32.8)

## 2017-12-26 LAB — VIT B1 BLD-SCNC: 203.8 NMOL/L (ref 66.5–200)

## 2017-12-27 ENCOUNTER — GENERIC CONVERSION - ENCOUNTER (OUTPATIENT)
Dept: OTHER | Facility: OTHER | Age: 47
End: 2017-12-27
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
## 2018-01-13 VITALS
BODY MASS INDEX: 24.97 KG/M2 | WEIGHT: 132.25 LBS | HEIGHT: 61 IN | DIASTOLIC BLOOD PRESSURE: 72 MMHG | SYSTOLIC BLOOD PRESSURE: 124 MMHG

## 2018-01-13 NOTE — RESULT NOTES
Verified Results  (1) TSH [id number] 07:01AM Zhou Hunt Order Number: [id number]     Test Name Result Flag Reference   TSH 1 879 uIU/mL  0 358-3 740   Patients undergoing fluorescein dye angiography may retain small amounts of fluorescein in the body for 48-72 hours post procedure  Samples containing fluorescein can produce falsely depressed TSH values  If the patient had this procedure,a specimen should be resubmitted post fluorescein clearance  The recommended reference ranges for TSH during pregnancy are as follows:  First trimester 0 1 to 2 5 uIU/mL  Second trimester  0 2 to 3 0 uIU/mL  Third trimester 0 3 to 3 0 uIU/m       Plan  Mixed hyperlipidemia    · Fish Oil 1000 MG Oral Capsule; Take 1 capsule twice daily   · Simvastatin 20 MG Oral Tablet; TAKE 1 TABLET AT BEDTIME   · (1) COMPREHENSIVE METABOLIC PANEL; Status:Active; Requested NLD:[id number];    · (1) LIPID PANEL FASTING W DIRECT LDL REFLEX; Status:Active;  Requested  MKM:[id number];

## 2018-01-15 NOTE — MISCELLANEOUS
Message   Recorded as Task   Date: 07/06/2017 10:13 AM, Created By: System   Task Name: Schedule Appointment   Assigned To: [address] ,Team   Regarding Patient: Genesis Collins, Status: In Progress   Comment:    System - 06 Jul 2017 10:13 AM     Preferred Communication: Mail  Ordering Site: [facility]    Referred To: Jeremy Alvarenga DO Pain Management  To Be Done: 06 Jul 2017   [facility] - 06 Jul 2017 11:03 AM     TASK IN PROGRESS   Dorcas Weathers - 06 Jul 2017 12:40 PM     TASK EDITED  spa team working on getting pt sched with [address] Jul 2017 2:09 PM     TASK IN PROGRESS   Clarita Calderon - 07 Jul 2017 2:10 PM     TASK EDITED  Per IDX pop up - intake in [facility] pass for ins review  Jo-Ann Gerber - 22 Aug 2017 12:28 PM     TASK EDITED  [address] Call Center- patient called back to schedule  Explained to patient we are still waiting for her order to be sent  patient stated she will have order sent    Violeta Gutierrez - 23 Aug 2017 1:49 PM     TASK REPLIED TO: Previously Assigned To Clarita Tavarez  Order is in [employer] and this task    Hi Clarita,     Can you please put intake in Dr Marilou montoya for insurance review  Thank you!!   Clarita Tavarez - 23 Aug 2017 2:10 PM     TASK REASSIGNED: Previously Assigned To Clarita Tavarez  out for ins review with J06   Clarita Tavarez - 23 Aug 2017 3:13 PM     TASK IN PROGRESS   Clarita Tavarez - 22 Sep 2017 3:37 PM     TASK REASSIGNED: Previously Assigned To [address]  tried to call pt to schedule appt  home # disconnected  cell # not   accepting calls  NON OPIOID therapy only  Clarita Tavarez - 26 Sep 2017 12:06 PM     TASK REASSIGNED: Previously Assigned To [address]  tried to call pt again and phone is disconnected  Please send can not reach letter  Violeta Gutierrez - 26 Sep 2017 12:53 PM     TASK EDITED  letter sent        Active Problems    1   Abdominal pain, LLQ (left lower quadrant) (140 25) (R10 32)   2  Abnormal mammogram (793 80) (R92 8)   3  Acute pharyngitis (462) (J02 9)   4  Anxiety (300 00) (F41 9)   5  Chronic low back pain (724 2,338 29) (M54 5,G89 29)   6  Degenerative disc disease, lumbar (722 52) (M51 36)   7  PTSD (post-traumatic stress disorder) (309 81) (F43 10)   8  Schizoaffective disorder, bipolar type (295 70) (F25 0)   9  Schizoaffective disorder, unspecified type (295 70) (F25 9)   10  Screening for depression (V79 0) (Z13 89)    Current Meds   1  Amoxicillin 500 MG Oral Capsule; TAKE 1 CAPSULE 3 TIMES DAILY; Therapy: 22KBP4727 to (Evaluate:88Xzj2605)  Requested for: 74KJZ5900; Last   Rx:84Dpe6598 Ordered   2  Benztropine Mesylate 2 MG Oral Tablet; Take 1 tablet by mouth at bedtime; Therapy: (Recorded:09Ebq3971) to Recorded   3  ClonazePAM 0 5 MG Oral Tablet; 1 tablet by mouth in the am and 1 in the pm;   Therapy: (Recorded:06Bsk6810) to Recorded   4  Haloperidol 5 MG Oral Tablet; Take 1 tablet by mouth in the Am and 1 in the PM;   Therapy: (Recorded:18Sze5617) to Recorded   5  Neurontin 300 MG Oral Capsule (Gabapentin); TAKE 1 CAPSULE 3 TIMES DAILY; Therapy: (Recorded:19Oct2016) to Recorded   6  OLANZapine 20 MG Oral Tablet; TAKE 1 TABLET AT BEDTIME; Therapy: (Recorded:69Zhx6521) to Recorded    Allergies    1  Ibuprofen TABS   2  ProAir HFA AERS   3  Darvocet A500   4  Depakote TBEC   5  lithium   6   Naproxen TABS    Signatures   Electronically signed by : Tamika Llanos, ; Sep 26 2017 12:53PM EST                       (Author)

## 2018-01-16 NOTE — MISCELLANEOUS
Signatures   Electronically signed by :  Hamida South, ; Oct 27 2017 10:30AM EST                       (Author)

## 2018-01-16 NOTE — RESULT NOTES
Verified Results  [address] 07IUS7153 06:51AM Ronda Bence Order Number: [id number]    - Patient Instructions: To schedule this appointment, please contact Central Scheduling at [phone]  Test Name Result Flag Reference   US ABDOMINAL WALL (Report)     LEFT-SIDED ABDOMEN ULTRASOUND     INDICATION: Left-sided abdominal pain associated with abdominal wall       COMPARISON: None  TECHNIQUE:  Real-time ultrasound of the left anterior abdominal wall was performed with a linear transducer with both volumetric sweeps and still imaging techniques  FINDINGS: Soft tissues appear unremarkable  No mass or cyst is detected  No ventral hernia is seen  Bowel with peristalsis is evident in the abdomen deep to the site of clinical concern  IMPRESSION:     Negative for ventral hernia detection  Workstation performed: [id number]     Signed by:   Geeta Davis MD   10/31/17

## 2018-01-19 ENCOUNTER — HOSPITAL ENCOUNTER (OUTPATIENT)
Dept: MAMMOGRAPHY | Facility: HOSPITAL | Age: 48
Discharge: HOME | End: 2018-01-19

## 2018-01-19 DIAGNOSIS — Z87.898 PERSONAL HISTORY OF OTHER SPECIFIED CONDITIONS (CODE): ICD-10-CM

## 2018-01-19 DIAGNOSIS — E53.9 VITAMIN B DEFICIENCY: ICD-10-CM

## 2018-01-23 VITALS
TEMPERATURE: 97.3 F | SYSTOLIC BLOOD PRESSURE: 112 MMHG | DIASTOLIC BLOOD PRESSURE: 84 MMHG | BODY MASS INDEX: 24.99 KG/M2 | HEIGHT: 61 IN | WEIGHT: 132.38 LBS

## 2018-01-23 NOTE — RESULT NOTES
Verified Results  (1) VITAMIN B12 11Ysg0806 09:29AM Luis Oliveira Order Number: [id number]     Test Name Result Flag Reference   VITAMIN B12 883 pg/mL  100-900     (1) FOLATE 69Val1731 09:29AM Luis Oliveira Order Number: [id number]     Test Name Result Flag Reference   FOLATE >20 0 ng/mL H 3 1-17 5

## 2018-01-24 ENCOUNTER — TRANSCRIBE ORDERS (OUTPATIENT)
Dept: ADMINISTRATIVE | Facility: HOSPITAL | Age: 48
End: 2018-01-24

## 2018-01-24 DIAGNOSIS — N63.0 BREAST LUMP: Primary | ICD-10-CM

## 2018-01-26 DIAGNOSIS — R92.8 ABNORMAL MAMMOGRAM OF RIGHT BREAST: Primary | ICD-10-CM

## 2018-01-29 ENCOUNTER — HOSPITAL ENCOUNTER (OUTPATIENT)
Dept: MAMMOGRAPHY | Facility: HOSPITAL | Age: 48
Discharge: HOME | End: 2018-01-29
Attending: FAMILY MEDICINE
Payer: COMMERCIAL

## 2018-01-29 DIAGNOSIS — N63.0 BREAST LUMP: ICD-10-CM

## 2018-01-29 PROCEDURE — 77063 BREAST TOMOSYNTHESIS BI: CPT

## 2018-01-29 PROCEDURE — 77067 SCR MAMMO BI INCL CAD: CPT

## 2018-01-31 DIAGNOSIS — E78.2 MIXED HYPERLIPIDEMIA: ICD-10-CM

## 2018-02-08 ENCOUNTER — TELEPHONE (OUTPATIENT)
Dept: FAMILY MEDICINE CLINIC | Facility: CLINIC | Age: 48
End: 2018-02-08

## 2018-02-08 DIAGNOSIS — M54.5 CHRONIC LOW BACK PAIN, UNSPECIFIED BACK PAIN LATERALITY, WITH SCIATICA PRESENCE UNSPECIFIED: Primary | ICD-10-CM

## 2018-02-08 DIAGNOSIS — G89.29 CHRONIC LOW BACK PAIN, UNSPECIFIED BACK PAIN LATERALITY, WITH SCIATICA PRESENCE UNSPECIFIED: Primary | ICD-10-CM

## 2018-02-08 NOTE — TELEPHONE ENCOUNTER
Pt states she is having pain in her lower back  She does not want to see p/m until she gets her eye taken care of  Pt will call insurance company and find out who is participating and call us back

## 2018-02-08 NOTE — TELEPHONE ENCOUNTER
Never went to PM appt - Too far away - will not travel out of [facility] - Would like to go to Dr Cary Matta (seen in past)  Pt states she needs Xrays of spine    ALSO - Needs referral for eye Dr because of Fx of eye socket - Providers choice    Pt says she needs referrals for bus for transportation

## 2018-02-08 NOTE — TELEPHONE ENCOUNTER
1  Cannot find a Dr Dougie Cabrera in our system  Will refer to Dr Jim Angela for pain  What area of her back is she wanting addressed so I know what type of xray to order? 2  She no-showed multiple times for [facility] and they will no longer schedule her  She needs to call her insurance company to find a participating ophth  After she gets a name, she can call us back and would be happy to send a referral     3  Not sure what she is taking about referrals for the bus?

## 2018-02-28 DIAGNOSIS — E78.5 HYPERLIPIDEMIA, UNSPECIFIED HYPERLIPIDEMIA TYPE: Primary | ICD-10-CM

## 2018-02-28 RX ORDER — SIMVASTATIN 20 MG
1 TABLET ORAL
COMMUNITY
Start: 2017-10-31 | End: 2018-02-28 | Stop reason: SDUPTHER

## 2018-02-28 RX ORDER — SIMVASTATIN 20 MG
20 TABLET ORAL
Qty: 90 TABLET | Refills: 0 | Status: SHIPPED | OUTPATIENT
Start: 2018-02-28 | End: 2018-08-17

## 2018-02-28 NOTE — TELEPHONE ENCOUNTER
I reordered her labs but unless they actually show she is deficient I do not recommend taking any vit b

## 2018-02-28 NOTE — TELEPHONE ENCOUNTER
Please let her know that she is taking Simvastatin which is Zocor, not Lipitor  If she is still okay with that, I will refill her Simvastatin  Also, her different Vit B levels were essentially  Normal and do not need to be rechecked for 6 months

## 2018-02-28 NOTE — TELEPHONE ENCOUNTER
Pt called for a refill lf Lipitor (simvastatin is recorded) - That medication was NOT in EMR for refill    Pharm: [facility]    When do you want to recheck her labs for vitamin levels - Pt says it was over a month

## 2018-02-28 NOTE — TELEPHONE ENCOUNTER
Pt states the reason her vitamin b levels were normal was because she was taking that at the time  She has since ran out and states that she is deficient  Please send Simvastatin to TOM/FREDDY Triana

## 2018-06-28 ENCOUNTER — HOSPITAL ENCOUNTER (EMERGENCY)
Facility: HOSPITAL | Age: 48
End: 2018-06-28
Attending: EMERGENCY MEDICINE
Payer: COMMERCIAL

## 2018-06-28 VITALS
HEART RATE: 82 BPM | OXYGEN SATURATION: 92 % | DIASTOLIC BLOOD PRESSURE: 82 MMHG | RESPIRATION RATE: 17 BRPM | TEMPERATURE: 98.2 F | SYSTOLIC BLOOD PRESSURE: 110 MMHG | HEIGHT: 60 IN | BODY MASS INDEX: 23.55 KG/M2 | WEIGHT: 119.93 LBS

## 2018-06-28 DIAGNOSIS — F25.9 SCHIZOAFFECTIVE DISORDER (HCC): Primary | ICD-10-CM

## 2018-06-28 LAB
ALBUMIN SERPL BCP-MCNC: 4 G/DL (ref 3.5–5)
ALP SERPL-CCNC: 104 U/L (ref 46–116)
ALT SERPL W P-5'-P-CCNC: 41 U/L (ref 12–78)
AMPHETAMINES SERPL QL SCN: NEGATIVE
ANION GAP SERPL CALCULATED.3IONS-SCNC: 8 MMOL/L (ref 4–13)
AST SERPL W P-5'-P-CCNC: 36 U/L (ref 5–45)
BARBITURATES UR QL: NEGATIVE
BASOPHILS # BLD AUTO: 0.05 THOUSANDS/ΜL (ref 0–0.1)
BASOPHILS NFR BLD AUTO: 1 % (ref 0–1)
BENZODIAZ UR QL: NEGATIVE
BILIRUB SERPL-MCNC: 0.3 MG/DL (ref 0.2–1)
BUN SERPL-MCNC: 5 MG/DL (ref 5–25)
CALCIUM SERPL-MCNC: 9.4 MG/DL (ref 8.3–10.1)
CHLORIDE SERPL-SCNC: 102 MMOL/L (ref 100–108)
CO2 SERPL-SCNC: 26 MMOL/L (ref 21–32)
COCAINE UR QL: NEGATIVE
CREAT SERPL-MCNC: 0.5 MG/DL (ref 0.6–1.3)
EOSINOPHIL # BLD AUTO: 0.22 THOUSAND/ΜL (ref 0–0.61)
EOSINOPHIL NFR BLD AUTO: 3 % (ref 0–6)
ERYTHROCYTE [DISTWIDTH] IN BLOOD BY AUTOMATED COUNT: 13.2 % (ref 11.6–15.1)
ETHANOL SERPL-MCNC: <3 MG/DL (ref 0–3)
EXT PREG TEST URINE: NEGATIVE
GFR SERPL CREATININE-BSD FRML MDRD: 116 ML/MIN/1.73SQ M
GLUCOSE SERPL-MCNC: 107 MG/DL (ref 65–140)
HCT VFR BLD AUTO: 48.3 % (ref 34.8–46.1)
HGB BLD-MCNC: 15.9 G/DL (ref 11.5–15.4)
IMM GRANULOCYTES # BLD AUTO: 0.02 THOUSAND/UL (ref 0–0.2)
IMM GRANULOCYTES NFR BLD AUTO: 0 % (ref 0–2)
LYMPHOCYTES # BLD AUTO: 2.19 THOUSANDS/ΜL (ref 0.6–4.47)
LYMPHOCYTES NFR BLD AUTO: 29 % (ref 14–44)
MCH RBC QN AUTO: 31.2 PG (ref 26.8–34.3)
MCHC RBC AUTO-ENTMCNC: 32.9 G/DL (ref 31.4–37.4)
MCV RBC AUTO: 95 FL (ref 82–98)
METHADONE UR QL: NEGATIVE
MONOCYTES # BLD AUTO: 0.69 THOUSAND/ΜL (ref 0.17–1.22)
MONOCYTES NFR BLD AUTO: 9 % (ref 4–12)
NEUTROPHILS # BLD AUTO: 4.48 THOUSANDS/ΜL (ref 1.85–7.62)
NEUTS SEG NFR BLD AUTO: 58 % (ref 43–75)
NRBC BLD AUTO-RTO: 0 /100 WBCS
OPIATES UR QL SCN: NEGATIVE
PCP UR QL: NEGATIVE
PLATELET # BLD AUTO: 250 THOUSANDS/UL (ref 149–390)
PMV BLD AUTO: 9.3 FL (ref 8.9–12.7)
POTASSIUM SERPL-SCNC: 5.2 MMOL/L (ref 3.5–5.3)
PROT SERPL-MCNC: 8.3 G/DL (ref 6.4–8.2)
RBC # BLD AUTO: 5.09 MILLION/UL (ref 3.81–5.12)
SODIUM SERPL-SCNC: 136 MMOL/L (ref 136–145)
THC UR QL: NEGATIVE
WBC # BLD AUTO: 7.65 THOUSAND/UL (ref 4.31–10.16)

## 2018-06-28 PROCEDURE — 36415 COLL VENOUS BLD VENIPUNCTURE: CPT | Performed by: EMERGENCY MEDICINE

## 2018-06-28 PROCEDURE — 80307 DRUG TEST PRSMV CHEM ANLYZR: CPT | Performed by: EMERGENCY MEDICINE

## 2018-06-28 PROCEDURE — 85025 COMPLETE CBC W/AUTO DIFF WBC: CPT | Performed by: EMERGENCY MEDICINE

## 2018-06-28 PROCEDURE — 99285 EMERGENCY DEPT VISIT HI MDM: CPT

## 2018-06-28 PROCEDURE — 81025 URINE PREGNANCY TEST: CPT | Performed by: EMERGENCY MEDICINE

## 2018-06-28 PROCEDURE — 80320 DRUG SCREEN QUANTALCOHOLS: CPT | Performed by: EMERGENCY MEDICINE

## 2018-06-28 PROCEDURE — 80053 COMPREHEN METABOLIC PANEL: CPT | Performed by: EMERGENCY MEDICINE

## 2018-06-28 RX ORDER — LORAZEPAM 0.5 MG/1
0.5 TABLET ORAL 2 TIMES DAILY
Status: DISCONTINUED | OUTPATIENT
Start: 2018-06-28 | End: 2018-06-28 | Stop reason: HOSPADM

## 2018-06-28 RX ORDER — RISPERIDONE 3 MG/1
3 TABLET, FILM COATED ORAL
COMMUNITY
End: 2018-08-17

## 2018-06-28 RX ORDER — LORAZEPAM 0.5 MG/1
TABLET ORAL 2 TIMES DAILY
COMMUNITY
End: 2018-08-23 | Stop reason: HOSPADM

## 2018-06-28 RX ADMIN — LORAZEPAM 0.5 MG: 0.5 TABLET ORAL at 11:18

## 2018-06-28 RX ADMIN — GABAPENTIN 400 MG: 300 CAPSULE ORAL at 16:36

## 2018-06-28 RX ADMIN — LORAZEPAM 0.5 MG: 0.5 TABLET ORAL at 20:11

## 2018-06-28 RX ADMIN — GABAPENTIN 400 MG: 300 CAPSULE ORAL at 11:18

## 2018-06-28 NOTE — ED NOTES
Pt was given ice water and an ice pack (strings were cut off) to cool her down  Pt stated "I didn't want an ice pack" and I stated back to her "Well you've been complaining about how hot it is so I am just trying to help you" Ice pack on the chair in the room        Chapman Ahumada  06/28/18 8682

## 2018-06-28 NOTE — ED NOTES
Pt standing on her bed with her face in the camera screaming "give me my fucking medicine"      Toñito Orellana  06/28/18 8582

## 2018-06-28 NOTE — ED NOTES
Pt currently eating lunch  Sitting up in bed  No signs of distress  Will continue to monitor        Asha Nye  06/28/18 3810

## 2018-06-28 NOTE — ED NOTES
Pt given her breakfast  Pt took one bite and then told me to turn her lights off as I was walking away   I asked pt if she didn't want to eat after she requested food and pt stated "no not right now"     Zaina Estevez  06/28/18 3793

## 2018-06-28 NOTE — ED NOTES
Pt  Appears to be laying quietly resting  Patient does not appear to be in distress at this time  Will continue to monitor         Taisha Pu  06/28/18 2733

## 2018-06-28 NOTE — ED NOTES
Pt brought back to [facility], Changed into paper scrubs  Pt currently giving a urine specimen         Patsy Soliz  06/28/18 0503

## 2018-06-28 NOTE — ED NOTES
Pt requested a new pair of pants and a pad  A new pair of pants, a pair of our underwear and a pad was given        [facility]  06/28/18 1038

## 2018-06-28 NOTE — ED NOTES
Pt threw everything around the room and then spit at the door        [facility]  06/28/18 1111

## 2018-06-28 NOTE — ED NOTES
Pt's sheet was changed and new blankets were given  New scrubs and socks were also given  Pt brushed teeth as well and received a pad        Karlee Sosar  06/28/18 7508

## 2018-06-28 NOTE — ED NOTES
Report received from [facility]  Patient is in bed laying quietly resting  Pt does not appear to be in distress at this time  Will continue to monitor       Gi Trujillo  06/28/18 2544

## 2018-06-28 NOTE — ED NOTES
Pt is a 53 y o  female who presented to the ED due to increased depression, SI, and alcohol intoxication  Pt reports that she was d/c from [facility] ER yesterday after wanting to go to inpt tx  Pt is currently linked with [address] [facility], and per pt, they had a new ACT team member come to the ER yesterday who she did not know  Pt has an extensive hx of inpt admissions, reporting that she has not been hospitalized in over a year  Pt also has an extensive hx of alcohol abuse, but denies it being as severe as it had been in the past  Pt has a hx of inpt detox/rehab  Pt reports that when she was d/c yesterday, she walked into a bar in [facility] and got drunk  Pt then stated that she left with a man who was going to drive her to [facility], but ended up dropping her off at a gas station in the middle of nowhere  Pt states that she contacted 911 at that time  Pt reports that she recently interviewed with [facility] to be placed in a group home  When asked if the pt had any suicidal thoughts at this time, the pt responded with "I don't want to hurt myself, but I am tired of no one helping me"  Pt proceeded to say "I feel like I'm about to lose it and do myself in"  Pt also reports that she experiences AH of "vioces disagreeing with her thoughts" and ACH of "voices telling me to kill myself"  Pt also adds that she's experiencing VH of "ghosts and people"  Pt admits to a hx of SA, but does not disclose how, adding "I've had several"  When asked if she is currently homicidal, the pt responded with "I wish people who don't help me, would die"  Pt is willing to sign a 201 at this time, and understands that she will be transferred elsewhere for treatment       Chief Complaint   Patient presents with    Psychiatric Evaluation     pt was brought in by EMS , [facility], states she is drunk and needs to be checked out , denies HI and SI but admits to visual hallucinations      Intake Assessment completed, Safety Risk Assessment completed      Emily Merida LMSW  06/28/18  2000

## 2018-06-28 NOTE — ED NOTES
Pt is currently laying in bed  Lights are on  No current signs of distress  Will continue to monitor        [facility]  06/28/18 1568

## 2018-06-28 NOTE — ED NOTES
Pt requested that we take her temperature  Temp was 98 2  Pt currently screaming that she has not received her medication  Pt sitting in her room locked because she closed the door again          Demetra Haynes  06/28/18 1100

## 2018-06-28 NOTE — ED NOTES
Pt was given her dinner  Pt screamed "this is sick, my cheese isn't even melted!!" I then explained to her that yelling at me is getting nowhere and that I was not the one who cooked it  I asked what else she would like and she asked for another grilled cheese that "was actually melted"  Will call dietary and ask        Asha Nye  06/28/18 9847

## 2018-06-28 NOTE — ED NOTES
Katerina Magana from [facility] arrived to the facility and recommended the pt sign in  201 signed by pt and physician      Deloria Lefort, LMSW  06/28/18  1044

## 2018-06-28 NOTE — ED NOTES
Went into pt's room to reassess vitals and complete blood work  Pt was cooperative about vitals but is still refusing blood work   Pt stated that she is sick of "being stuck like a pig"      Bernardo Pritchett  06/28/18 1173

## 2018-06-28 NOTE — ED NOTES
Requested pt to sign 201, in which she proceeded to ask where she would be transferred to  This writer explained that we have not begun a bed search yet, in which the pt stated that "she does not want to sign in without knowing where she is going"  Pt then asked to contact her ACT team, phone was provided       Samina Tom LMSW  06/28/18  0900

## 2018-06-28 NOTE — ED NOTES
Pt appears to be sleeping with no signs of distress  Will continue to monitor        Collette Brush  06/28/18 5864

## 2018-06-28 NOTE — ED NOTES
Pt  Appears to be laying quietly resting  Patient does not appear to be in distress at this time  Will continue to monitor         Russel Mcmillan  06/28/18 6949

## 2018-06-28 NOTE — ED CARE HANDOFF
Emergency Department Sign Out Note        Sign out and transfer of care from Dr Janette Stein  See Separate Emergency Department note  The patient, Stefano Salazar, was evaluated by the previous provider for psychosis and drug intoxication  Workup Completed:  labs    ED Course / Workup Pending (followup): Waiting for crisis Evaluation  Procedures  MDM  Number of Diagnoses or Management Options  Schizoaffective disorder [facility]): established and worsening  Diagnosis management comments: 10:58 AM  Was seen and evaluated by crisis worker  Signed 201  Waiting for bed search now      2:24 PM  Patient being transferred to [facility] under Dr Sterling escobdeo  Transport set up for 8pm         Amount and/or Complexity of Data Reviewed  Discuss the patient with other providers: yes    Risk of Complications, Morbidity, and/or Mortality  Presenting problems: high  Diagnostic procedures: high  Management options: high      CritCare Time      Disposition  Final diagnoses:   Schizoaffective disorder ([address] )     Time reflects when diagnosis was documented in both MDM as applicable and the Disposition within this note     Time User Action Codes Description Comment    6/28/2018  2:17 PM Erin Bellamy Add [F25 9] Schizoaffective disorder [facility])       ED Disposition     ED Disposition Condition Comment    Transfer to Another [facility]   63  should be transferred out to [facility] under Dr Sterling escobedo        MD Documentation      Most Recent Value   Patient Condition  The patient has been stabilized such that within reasonable medical probability, no material deterioration of the patient condition or the condition of the unborn child(yeni) is likely to result from the transfer   Reason for Transfer  Level of Care needed not available at this facility   Benefits of Transfer  Other benefits (Include comment)_______________________   Risks of Transfer  Potential deterioration of medical condition Accepting Physician  Dr Sotero Lazaro Name, [place], [place]    (Name & Tel number)  MARK ANTHONY Churchill   Transported by [facility] and Unit #)  [facility] [phone]   Sending MD Dr Lenny Whaley   Provider Certification  The patient is stable for psychiatric transfer because they are medically stable, and is protected from harming him/herself or others during transport      RN Documentation      [address] [place], [place], [place]    (Name & Tel number)  MARK ANTHONY Churchill   Transported by [facility] and Unit #)  [facility] [phone]      Follow-up Information    None       Patient's Medications   Discharge Prescriptions    No medications on file     No discharge procedures on file         ED Provider  Electronically Signed by     Loretta Ivan DO  06/28/18 9612

## 2018-06-28 NOTE — ED NOTES
Pt is refusing blood work  Understands that in order to send her to a facility/NP, we would need to know her current alcohol level       Mabel Phillips LMSW  06/28/18  8599

## 2018-06-28 NOTE — ED NOTES
Pt laying in bed talking with the crisis worker  No signs of distress  Will continue to monitor        Lauryn Rosa  06/28/18 9109

## 2018-06-28 NOTE — ED NOTES
Pt appears to be sleeping with no signs of distress  Will continue to monitor        Lauryn Moe  06/28/18 4026

## 2018-06-28 NOTE — ED NOTES
Pt  Appears to be laying quietly resting  Patient does not appear to be in distress at this time  Will continue to monitor         Lyla Briceño  06/28/18 9383

## 2018-06-28 NOTE — ED NOTES
Pt care handed over to tech LR, pt appears to be laying in bed, no s/s of distress noted       Devon Javed  06/28/18 1940

## 2018-06-28 NOTE — ED NOTES
Pt accidentally locked herself in the room  When I opened the door, pt starting yelling at me stating "why do you continue to turn on and off the fan in here, and a fricken ice pack for heat exhaustion? Are you kidding me" I informed the pt that her yelling at me is gettiing nowhere and there is no reason for it        Kristi Martinez  06/28/18 5491

## 2018-06-28 NOTE — ED NOTES
Spoke with Armagh at [facility], who indicated that the pt will be accepted by Dr Julia Hinton on their Adult 1 unit, pending labs are ok  Pt in agreement with having labs completed: CBS, CMP and BAL  Informed Charge YUDY Ramirez, [facility]  06/28/18  4949

## 2018-06-28 NOTE — ED NOTES
Pt came out of the bathroom with her face dripping in water  Pt insisting that she "has heat exhaustion" and can't take the heat back in the secured holding area  Pt is getting very aggravated and is mad that "nobody will believe her" about how hot she is        Collettelaura Foley  06/28/18 6386

## 2018-06-28 NOTE — ED NOTES
Dr Bellamy made aware that patient is being uncooperative and screaming    Asked to order patients medications      Khoi Vaughan RN  06/28/18 2595

## 2018-06-28 NOTE — ED NOTES
Pt currently laying down in bed  Pt requested a toothbrush and toothbrush and toothpaste was given  No signs of distress  Samir continue to monitor        Bernardo Pritchett  06/28/18 5260

## 2018-06-28 NOTE — ED NOTES
Insurance Authorization:   [facility]  Phone number: [phone]  Spoke to [place]      5 days approved  Level of care: Intyrone WHITTAKER Tx (201)  Review on 7/2  Authorization # [id number]        Mary Ruano LMSW  06/28/18  1136

## 2018-06-28 NOTE — ED NOTES
Pt appears to be sleeping with no signs of distress  Will continue to monitor        [facility]  06/28/18 8151

## 2018-06-28 NOTE — ED NOTES
Pt given phone and remote  Pt also requesting medication  Charge nurse aware        Harpal Templeton  06/28/18 8200

## 2018-06-28 NOTE — ED NOTES
Attempted to call [employer] in [facility] to get a list of patients medication, no one answered the phone at [employer]  Will try again in a little bit     [address] [address] [address], RN  06/28/18 5474

## 2018-06-28 NOTE — ED NOTES
Pt closed door, pt aware her door is locked and needs to knock on door or ring dannielle Javed  06/28/18 1943

## 2018-06-28 NOTE — ED NOTES
Spoke with [facility], and requested that a call be returned by Rosario Garvin, [facility]  06/28/18  7794

## 2018-06-28 NOTE — ED PROVIDER NOTES
History  Chief Complaint   Patient presents with    Psychiatric Evaluation     pt was brought in by EMS , pt called 911 after leaving [facility] and drinking, states she is drunk and needs to be checked out , denies HI and SI but admits to visual hallucinations      59-year-old female with a history of schizoaffective disorder and alcohol abuse presents with unclear concerns stating she needs to get "checked out" and put "back on my meds"  Patient is rambling and tangential   She lists a variety of medications she is supposed to take though these do not appear to be patient's current medications as best can be told from the medical record  Patient was recently evaluated at [facility] though it is unclear if she was admitted or they adjusted her medications  She states that they would not prescribe the correct medications and all they do is change around her medications without any affect  Patient affirms depression but is vague about suicidal thoughts, returning to her concerns about her medications being adjusted without her agreement  Patient is amenable to evaluation by crisis  Impression and plan: delusions and hallucinations in a patient with known schizoaffective disorder  Patient is vague regarding any thoughts of self-harm but does not actively express any suicidal thoughts to myself  She does not meet criteria for involuntary admission to the hospital but is amenable to the evaluation by crisis who will see her in the morning  Closely monitor patient in the emergency room overnight and re-evaluation after crisis has seen the patient  Patient has requested specific medications but it is unclear whether patient is currently taking these medications as medical record does not reflect these medications  Will closely monitor patient for need for additional medications that she was asleep on re-evaluation after initially requesting these medications          Psychiatric Evaluation   Presenting symptoms: delusional and disorganized speech    Degree of incapacity (severity): Moderate  Onset quality:  Unable to specify  Timing:  Unable to specify  Progression:  Unable to specify  Context: alcohol use    Treatment compliance:  Unable to specify      Prior to Admission Medications   Prescriptions Last Dose Informant Patient Reported? Taking? LORazepam (ATIVAN) 0 5 mg tablet   Yes Yes   Sig: Take by mouth 2 (two) times a day   benztropine (COGENTIN) 2 mg tablet   Yes No   Sig: Take 2 mg by mouth daily at bedtime     gabapentin (NEURONTIN) 400 mg capsule   Yes No   Sig: Take 400 mg by mouth 4 (four) times a day     risperiDONE (RisperDAL) 3 mg tablet   Yes Yes   Sig: Take 3 mg by mouth daily at bedtime   simvastatin (ZOCOR) 20 mg tablet   No No   Sig: Take 1 tablet (20 mg total) by mouth daily at bedtime      Facility-Administered Medications: None       Past Medical History:   Diagnosis Date    DJD (degenerative joint disease)     Pancreatitis     Psychiatric disorder     schizophrenia    PTSD (post-traumatic stress disorder)     [facility] [address] [facility]     Skull fracture ([facility] )        Past Surgical History:   Procedure Laterality Date     SECTION         History reviewed  No pertinent family history  I have reviewed and agree with the history as documented  Social History   Substance Use Topics    Smoking status: Current Every Day Smoker     Packs/day: 1 00    Smokeless tobacco: Never Used    Alcohol use Yes      Comment: alot lately        Review of Systems   Unable to perform ROS: Psychiatric disorder       Physical Exam  Physical Exam   Constitutional: She appears well-developed and well-nourished  No distress  HENT:   Head: Normocephalic and atraumatic  Eyes: EOM are normal  Pupils are equal, round, and reactive to light  Neck: Normal range of motion  Neck supple  Cardiovascular: Normal rate and regular rhythm      Pulmonary/Chest: Effort normal and breath sounds normal  Abdominal: Soft  She exhibits no distension  Musculoskeletal: She exhibits no deformity  Neurological: She is alert  Skin: Skin is dry  She is not diaphoretic  Psychiatric: Her affect is labile  Her speech is rapid and/or pressured and tangential  She is hyperactive  She expresses no homicidal and no suicidal ideation  She expresses no suicidal plans and no homicidal plans  Vitals reviewed        Vital Signs  ED Triage Vitals   Temperature Pulse Respirations Blood Pressure SpO2   06/28/18 1105 06/28/18 0212 06/28/18 0212 06/28/18 0212 06/28/18 0212   98 2 °F (36 8 °C) 100 18 125/95 95 %      Temp Source Heart Rate Source Patient Position - Orthostatic VS BP Location FiO2 (%)   06/28/18 1105 06/28/18 0212 06/28/18 0212 06/28/18 0212 --   Oral Monitor Lying Right arm       Pain Score       --                  Vitals:    06/28/18 0621 06/28/18 0926 06/28/18 1428 06/28/18 1831   BP:  105/71  110/82   Pulse: 92 86 88 82   Patient Position - Orthostatic VS:  Lying  Lying       Visual Acuity      ED Medications  Medications - No data to display    Diagnostic Studies  Results Reviewed     Procedure Component Value Units Date/Time    Comprehensive metabolic panel [[id number]]  (Abnormal) Collected:  06/28/18 1232    Lab Status:  Final result Specimen:  Blood from Arm, Right Updated:  06/28/18 1312     Sodium 136 mmol/L      Potassium 5 2 mmol/L      Chloride 102 mmol/L      CO2 26 mmol/L      Anion Gap 8 mmol/L      BUN 5 mg/dL      Creatinine 0 50 (L) mg/dL      Glucose 107 mg/dL      Calcium 9 4 mg/dL      AST 36 U/L      ALT 41 U/L      Alkaline Phosphatase 104 U/L      Total Protein 8 3 (H) g/dL      Albumin 4 0 g/dL      Total Bilirubin 0 30 mg/dL      eGFR 116 ml/min/1 73sq m     Narrative:         National Kidney Disease Education Program recommendations are as follows:  GFR calculation is accurate only with a steady state creatinine  Chronic Kidney disease less than 60 ml/min/1 73 sq  meters  Kidney failure less than 15 ml/min/1 73 sq  meters  Ethanol [[id number]]  (Normal) Collected:  06/28/18 1232    Lab Status:  Final result Specimen:  Blood from Arm, Right Updated:  06/28/18 1257     Ethanol Lvl <3 mg/dL     CBC and differential [[id number]]  (Abnormal) Collected:  06/28/18 1232    Lab Status:  Final result Specimen:  Blood from Arm, Right Updated:  06/28/18 1244     WBC 7 65 Thousand/uL      RBC 5 09 Million/uL      Hemoglobin 15 9 (H) g/dL      Hematocrit 48 3 (H) %      MCV 95 fL      MCH 31 2 pg      MCHC 32 9 g/dL      RDW 13 2 %      MPV 9 3 fL      Platelets 619 Thousands/uL      nRBC 0 /100 WBCs      Neutrophils Relative 58 %      Immat GRANS % 0 %      Lymphocytes Relative 29 %      Monocytes Relative 9 %      Eosinophils Relative 3 %      Basophils Relative 1 %      Neutrophils Absolute 4 48 Thousands/µL      Immature Grans Absolute 0 02 Thousand/uL      Lymphocytes Absolute 2 19 Thousands/µL      Monocytes Absolute 0 69 Thousand/µL      Eosinophils Absolute 0 22 Thousand/µL      Basophils Absolute 0 05 Thousands/µL     Rapid drug screen, urine [[id number]]  (Normal) Collected:  06/28/18 0757    Lab Status:  Final result Specimen:  Urine from Urine, Clean Catch Updated:  06/28/18 0817     Amph/Meth UR Negative     Barbiturate Ur Negative     Benzodiazepine Urine Negative     Cocaine Urine Negative     Methadone Urine Negative     Opiate Urine Negative     PCP Ur Negative     THC Urine Negative    Narrative:         FOR MEDICAL PURPOSES ONLY  IF CONFIRMATION NEEDED PLEASE CONTACT THE LAB WITHIN 5 DAYS      Drug Screen Cutoff Levels:  AMPHETAMINE/METHAMPHETAMINES  1000 ng/mL  BARBITURATES     200 ng/mL  BENZODIAZEPINES     200 ng/mL  COCAINE      300 ng/mL  METHADONE      300 ng/mL  OPIATES      300 ng/mL  PHENCYCLIDINE     25 ng/mL  THC       50 ng/mL    POCT pregnancy, urine [[id number]]  (Normal) Resulted:  06/28/18 0758    Lab Status:  Final result Specimen:  Urine Updated:  06/28/18 0758     EXT PREG TEST UR (Ref: Negative) negative                 No orders to display              Procedures  Procedures       Phone Contacts  ED Phone Contact    ED Course                               MDM  CritCare Time    Disposition  Final diagnoses:   Schizoaffective disorder ([address] )     Time reflects when diagnosis was documented in both MDM as applicable and the Disposition within this note     Time User Action Codes Description Comment    6/28/2018  2:17 PM Sarath Bellamy Add [F25 9] Schizoaffective disorder [facility])       ED Disposition     ED Disposition Condition Comment    Transfer to Another [facility]  63  should be transferred out to [facility] under Dr Nicolas escobedo MD Documentation      Most Recent Value   Patient Condition  The patient has been stabilized such that within reasonable medical probability, no material deterioration of the patient condition or the condition of the unborn child(yeni) is likely to result from the transfer   Reason for Transfer  Level of Care needed not available at this facility   Benefits of Transfer  Other benefits (Include comment)_______________________   Risks of Transfer  Potential deterioration of medical condition   Accepting Physician  Dr Alo Flood Name, [address] [place], [place]    (Name & Tel number)  MARK ANTHONY Chowdhury   Transported by [facility] and Unit #)  [facility] [phone]   Sending MD Dr Soila Kanner   Provider Certification  The patient is stable for psychiatric transfer because they are medically stable, and is protected from harming him/herself or others during transport      RN Documentation      Most [address] [facility] Name, [address] [place], [place]    (Name & Tel number)  MARK ANTHONY Chowdhury   Report Given to  Ari Claudio by [facility] and Unit [facility])  [facility] [phone]      Follow-up Information    None         Discharge Medication List as of 6/28/2018 8:52 PM      CONTINUE these medications which have NOT CHANGED    Details   LORazepam (ATIVAN) 0 5 mg tablet Take by mouth 2 (two) times a day, Historical Med      risperiDONE (RisperDAL) 3 mg tablet Take 3 mg by mouth daily at bedtime, Historical Med      benztropine (COGENTIN) 2 mg tablet Take 2 mg by mouth daily at bedtime  , Historical Med      gabapentin (NEURONTIN) 400 mg capsule Take 400 mg by mouth 4 (four) times a day  , Historical Med      simvastatin (ZOCOR) 20 mg tablet Take 1 tablet (20 mg total) by mouth daily at bedtime, Starting Wed 2/28/2018, Normal           No discharge procedures on file      ED Provider  Electronically Signed by           Alden Dowd MD  07/05/18 7199

## 2018-06-28 NOTE — ED NOTES
Received report from technician [facility]  Pt appears to be sleeping with no signs of distress  Will continue to monitor        Radha Johnson  06/28/18 1795

## 2018-06-28 NOTE — ED NOTES
Call placed to [facility] [place], spoke with Enedina Fisher, awaiting call back from intake      Luis Banks LMSW  06/28/18  1219

## 2018-06-28 NOTE — ED NOTES
Patient is accepted at [facility]   Patient is accepted by Dr Shahnaz Angel per [facility] in admissions  Transportation is arranged with Craige  at [facility]  Transportation is scheduled for 8pm        Nurse report is to be called to [phone] prior to patient transfer      Elisa Weems LMSW  06/28/18  9719

## 2018-06-28 NOTE — ED NOTES
[facility] [phone] and received v/m  Message left requesting an immediate call back       Adair Haynes LMSW  06/28/18  9124

## 2018-06-28 NOTE — ED NOTES
Received a call from Andrew Stockton at [facility], who stated that he would be here within 30 minutes to discuss signing in  If pt refuses, he will drive her to her destination       Mary Ruano LMSW  06/28/18  8460

## 2018-06-28 NOTE — LETTER
[address] [facility]  [address] [facility] [address]  Dept: [phone]                                                               EMTALA TRANSFER CONSENT    NAME Joseph Armas                         1970                              MRN [id number]    I have been informed of my rights regarding examination, treatment, and transfer   by Dr Dany Angel DO    Benefits: Other benefits (Include comment)_______________________    Risks: Potential deterioration of medical condition    Consent for Transfer:  I acknowledge that my medical condition has been evaluated and explained to me by the emergency department physician or other qualified medical person and/or my attending physician, who has recommended that I be transferred to the service of  Accepting Physician: Dr Geo Carvajal at [address] [place] [address] Name, [address] : [address] [place], [place]  The above potential benefits of such transfer, the potential risks associated with such transfer, and the probable risks of not being transferred have been explained to me, and I fully understand them  The doctor has explained that, in my case, the benefits of transfer outweigh the risks  I agree to be transferred  I authorize the performance of emergency medical procedures and treatments upon me in both transit and upon arrival at the receiving facility  Additionally, I authorize the release of any and all medical records to the receiving facility and request they be transported with me, if possible  I understand that the safest mode of transportation during a medical emergency is an ambulance and that the Hospital advocates the use of this mode of transport  Risks of traveling to the receiving facility by car, including absence of medical control, life sustaining equipment, such as oxygen, and medical personnel has been explained to me and I fully understand them      (OSEI CORRECT BOX BELOW)  X]  I consent to the stated transfer and to be transported by ambulance/helicopter  [  ]  I consent to the stated transfer, but refuse transportation by ambulance and accept full responsibility for my transportation by car  I understand the risks of non-ambulance transfers and I exonerate the Hospital and its staff from any deterioration in my condition that results from this refusal     X___________________________________________    DATE  18  TIME________  Signature of patient or legally responsible individual signing on patient behalf           RELATIONSHIP TO PATIENT_________________________                          Provider Certification    NAME Yakov Marrero                                         1970                              MRN [id number]    A medical screening exam was performed on the above named patient  Based on the examination:    Condition Necessitating Transfer Suicidal Ideation  ACH/VH  Patient Condition: The patient has been stabilized such that within reasonable medical probability, no material deterioration of the patient condition or the condition of the unborn child(yeni) is likely to result from the transfer    Reason for Transfer: Level of Care needed not available at this facility    Transfer Requirements: [place], [place]   · Space available and qualified personnel available for treatment as acknowledged by MARK ANTHONY Vigil  · Agreed to accept transfer and to provide appropriate medical treatment as acknowledged by       Dr Elfego Ruano  · Appropriate medical records of the examination and treatment of the patient are provided at the time of transfer   [address] [facility] [address] ___JG____  · Transfer will be performed by qualified personnel from [facility] [phone]  and appropriate transfer equipment as required, including the use of necessary and appropriate life support measures      Provider Certification: I have examined the patient and explained the following risks and benefits of being transferred/refusing transfer to the patient/family:  The patient is stable for psychiatric transfer because they are medically stable, and is protected from harming him/herself or others during transport      Based on these reasonable risks and benefits to the patient and/or the unborn child(yeni), and based upon the information available at the time of the patients examination, I certify that the medical benefits reasonably to be expected from the provision of appropriate medical treatments at another medical facility outweigh the increasing risks, if any, to the individuals medical condition, and in the case of labor to the unborn child, from effecting the transfer      X____________________________________________ DATE 06/28/18        TIME_______      ORIGINAL - SEND TO MEDICAL RECORDS   COPY - SEND WITH PATIENT DURING TRANSFER

## 2018-07-20 ENCOUNTER — HOSPITAL ENCOUNTER (EMERGENCY)
Facility: HOSPITAL | Age: 48
Discharge: HOME | End: 2018-07-20
Attending: EMERGENCY MEDICINE | Admitting: EMERGENCY MEDICINE
Payer: COMMERCIAL

## 2018-07-20 VITALS
WEIGHT: 115 LBS | SYSTOLIC BLOOD PRESSURE: 130 MMHG | HEART RATE: 96 BPM | RESPIRATION RATE: 16 BRPM | BODY MASS INDEX: 22.58 KG/M2 | HEIGHT: 60 IN | OXYGEN SATURATION: 98 % | DIASTOLIC BLOOD PRESSURE: 100 MMHG | TEMPERATURE: 97.8 F

## 2018-07-20 DIAGNOSIS — F20.0 PARANOID SCHIZOPHRENIA (HCC): Primary | ICD-10-CM

## 2018-07-20 LAB
AMPHETAMINES SERPL QL SCN: NEGATIVE
ANION GAP SERPL CALCULATED.3IONS-SCNC: 8 MMOL/L (ref 4–13)
BARBITURATES UR QL: NEGATIVE
BASOPHILS # BLD AUTO: 0.1 THOUSANDS/ΜL (ref 0–0.1)
BASOPHILS NFR BLD AUTO: 0 % (ref 0–2)
BENZODIAZ UR QL: NEGATIVE
BILIRUB UR QL STRIP: NEGATIVE
BUN SERPL-MCNC: 3 MG/DL (ref 7–25)
CALCIUM SERPL-MCNC: 9.2 MG/DL (ref 8.6–10.5)
CHLORIDE SERPL-SCNC: 101 MMOL/L (ref 98–107)
CLARITY UR: ABNORMAL
CO2 SERPL-SCNC: 26 MMOL/L (ref 21–31)
COCAINE UR QL: NEGATIVE
COLOR UR: ABNORMAL
CREAT SERPL-MCNC: 0.54 MG/DL (ref 0.6–1.2)
EOSINOPHIL # BLD AUTO: 0.2 THOUSAND/ΜL (ref 0–0.61)
EOSINOPHIL NFR BLD AUTO: 1 % (ref 0–5)
ERYTHROCYTE [DISTWIDTH] IN BLOOD BY AUTOMATED COUNT: 13.8 % (ref 11.5–14.5)
ETHANOL SERPL-MCNC: <10 MG/DL
EXT PREG TEST URINE: NEGATIVE
GFR SERPL CREATININE-BSD FRML MDRD: 113 ML/MIN/1.73SQ M
GLUCOSE SERPL-MCNC: 113 MG/DL (ref 65–99)
GLUCOSE UR STRIP-MCNC: NEGATIVE MG/DL
HCT VFR BLD AUTO: 39.9 % (ref 34.8–46.1)
HGB BLD-MCNC: 13.9 G/DL (ref 12–16)
HGB UR QL STRIP.AUTO: NEGATIVE
KETONES UR STRIP-MCNC: NEGATIVE MG/DL
LEUKOCYTE ESTERASE UR QL STRIP: NEGATIVE
LYMPHOCYTES # BLD AUTO: 2.9 THOUSANDS/ΜL (ref 0.6–4.47)
LYMPHOCYTES NFR BLD AUTO: 21 % (ref 21–51)
MCH RBC QN AUTO: 32 PG (ref 26–34)
MCHC RBC AUTO-ENTMCNC: 34.7 G/DL (ref 31–37)
MCV RBC AUTO: 92 FL (ref 81–99)
METHADONE UR QL: NEGATIVE
MONOCYTES # BLD AUTO: 0.7 THOUSAND/ΜL (ref 0.17–1.22)
MONOCYTES NFR BLD AUTO: 5 % (ref 2–12)
NEUTROPHILS # BLD AUTO: 10 THOUSANDS/ΜL (ref 1.4–6.5)
NEUTS SEG NFR BLD AUTO: 72 % (ref 42–75)
NITRITE UR QL STRIP: NEGATIVE
NRBC BLD AUTO-RTO: 0 /100 WBCS
OPIATES UR QL SCN: NEGATIVE
PCP UR QL: NEGATIVE
PH UR STRIP.AUTO: 6 [PH] (ref 5–8)
PLATELET # BLD AUTO: 305 THOUSANDS/UL (ref 149–390)
PMV BLD AUTO: 7.3 FL (ref 8.6–11.7)
POTASSIUM SERPL-SCNC: 3.8 MMOL/L (ref 3.5–5.5)
PROT UR STRIP-MCNC: NEGATIVE MG/DL
RBC # BLD AUTO: 4.33 MILLION/UL (ref 3.9–5.2)
SODIUM SERPL-SCNC: 135 MMOL/L (ref 134–143)
SP GR UR STRIP.AUTO: <=1.005 (ref 1–1.03)
THC UR QL: NEGATIVE
UROBILINOGEN UR QL STRIP.AUTO: 0.2 E.U./DL
WBC # BLD AUTO: 13.9 THOUSAND/UL (ref 4.8–10.8)

## 2018-07-20 PROCEDURE — 81003 URINALYSIS AUTO W/O SCOPE: CPT | Performed by: EMERGENCY MEDICINE

## 2018-07-20 PROCEDURE — 85025 COMPLETE CBC W/AUTO DIFF WBC: CPT | Performed by: EMERGENCY MEDICINE

## 2018-07-20 PROCEDURE — 81025 URINE PREGNANCY TEST: CPT | Performed by: EMERGENCY MEDICINE

## 2018-07-20 PROCEDURE — 80320 DRUG SCREEN QUANTALCOHOLS: CPT | Performed by: EMERGENCY MEDICINE

## 2018-07-20 PROCEDURE — 80048 BASIC METABOLIC PNL TOTAL CA: CPT | Performed by: EMERGENCY MEDICINE

## 2018-07-20 PROCEDURE — 36415 COLL VENOUS BLD VENIPUNCTURE: CPT | Performed by: EMERGENCY MEDICINE

## 2018-07-20 PROCEDURE — 80307 DRUG TEST PRSMV CHEM ANLYZR: CPT | Performed by: EMERGENCY MEDICINE

## 2018-07-20 PROCEDURE — 99284 EMERGENCY DEPT VISIT MOD MDM: CPT
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
## 2018-07-20 NOTE — ED NOTES
Pt is a 54 y/o F who presents due to increased anxiety and increased paranoia  Pt denies any current SI/HI or desire to hurt herself or anyone else in any way  Pt is A & O X 4  Pt denies any AH or VH but is experiencing paranoia and believes people are watching her  Pt does not feel she is in need of I/P psych admission and has an ACT team that she is willing to f/u with  CIS spoke to Brody at [facility], who is the lead team member for this pt's act team  Brody is in agreement that the pt sounds to be at her baseline and agrees w/ D/C home  Brody further agreed to f/u w/ pt at her home in a timely manner

## 2018-07-20 NOTE — DISCHARGE INSTRUCTIONS
Schizophrenia   WHAT YOU NEED TO KNOW:   Schizophrenia is a long-term mental disease that affects how your brain works  It is a disease that may change how you think, feel, and behave  You may not be able to know what is real and what is not real  Your thoughts may not be clear, or may jump from one topic to another  DISCHARGE INSTRUCTIONS:   Medicines:   · Antipsychotics: These help decrease psychotic symptoms and severe agitation  You may need antiparkinson medicine to control muscle stiffness, twitches, and restlessness caused by antipsychotic medicines  · Antianxiety medicine: This medicine may be given to decrease anxiety and help you feel calm and relaxed  · Antidepressants: These help with symptoms of depression and anxiety  · Mood stabilizers: These control mood swings  · Tranquilizers: These increase feelings of being calm and relaxed  · Take your medicine as directed  Contact your healthcare provider if you think your medicine is not helping or if you have side effects  Tell him of her if you are allergic to any medicine  Keep a list of the medicines, vitamins, and herbs you take  Include the amounts, and when and why you take them  Bring the list or the pill bottles to follow-up visits  Carry your medicine list with you in case of an emergency  Follow up with your healthcare provider or psychiatrist as directed:  Write down your questions so you remember to ask them during your visits  Manage your symptoms:   · Do not stop taking your medicines:  Tell your healthcare provider or psychiatrist if you have any problems with or questions about your medicines  · Do not stop your therapies: It is normal to have doubts about or to feel discomfort with your therapy  Tell your healthcare provider or psychiatrist if you are not comfortable or have questions about your therapies  · Get regular sleep:  Try to get 6 to 8 hours of sleep each night   Tell your healthcare provider or psychiatrist if you are not able to sleep, or if you are sleeping too much  · Do not drink alcohol:  Alcohol interacts with medicine used to treat schizophrenia  Contact your healthcare provider or psychiatrist if:   · You feel that you are having symptoms of schizophrenia  · You are not able to sleep well, or are sleeping more than usual     · You cannot eat or are eating more than usual     · You have questions about your condition or care  Return to the emergency department if:   · You think about killing yourself or someone else  · You have a rash, swelling, or trouble breathing after you take your medicine  © 2017 [address] Information is for End User's use only and may not be sold, redistributed or otherwise used for commercial purposes  All illustrations and images included in CareNotes® are the copyrighted property of [employer]  or Jamari Mota  The above information is an  only  It is not intended as medical advice for individual conditions or treatments  Talk to your doctor, nurse or pharmacist before following any medical regimen to see if it is safe and effective for you

## 2018-08-17 ENCOUNTER — HOSPITAL ENCOUNTER (INPATIENT)
Facility: HOSPITAL | Age: 48
LOS: 6 days | Discharge: HOME | DRG: 750 | End: 2018-08-23
Attending: EMERGENCY MEDICINE | Admitting: PSYCHIATRY & NEUROLOGY
Payer: COMMERCIAL

## 2018-08-17 DIAGNOSIS — F10.10 CHRONIC ALCOHOL ABUSE: ICD-10-CM

## 2018-08-17 DIAGNOSIS — R45.851 DEPRESSION WITH SUICIDAL IDEATION: Primary | ICD-10-CM

## 2018-08-17 DIAGNOSIS — F10.929 ACUTE ALCOHOL INTOXICATION (HCC): ICD-10-CM

## 2018-08-17 DIAGNOSIS — F41.9 ANXIETY: ICD-10-CM

## 2018-08-17 DIAGNOSIS — F32.A DEPRESSION WITH SUICIDAL IDEATION: Primary | ICD-10-CM

## 2018-08-17 DIAGNOSIS — F25.0 SCHIZOAFFECTIVE DISORDER, BIPOLAR TYPE (HCC): ICD-10-CM

## 2018-08-17 LAB
ALBUMIN SERPL BCP-MCNC: 4.3 G/DL (ref 3.5–5.7)
ALP SERPL-CCNC: 69 U/L (ref 40–150)
ALT SERPL W P-5'-P-CCNC: 17 U/L (ref 7–52)
AMPHETAMINES SERPL QL SCN: NEGATIVE
ANION GAP SERPL CALCULATED.3IONS-SCNC: 7 MMOL/L (ref 4–13)
AST SERPL W P-5'-P-CCNC: 29 U/L (ref 13–39)
BARBITURATES UR QL: NEGATIVE
BASOPHILS # BLD AUTO: 0 THOUSANDS/ΜL (ref 0–0.1)
BASOPHILS NFR BLD AUTO: 1 % (ref 0–2)
BENZODIAZ UR QL: NEGATIVE
BILIRUB SERPL-MCNC: 0.3 MG/DL (ref 0.2–1)
BUN SERPL-MCNC: 6 MG/DL (ref 7–25)
CALCIUM SERPL-MCNC: 8.8 MG/DL (ref 8.6–10.5)
CHLORIDE SERPL-SCNC: 98 MMOL/L (ref 98–107)
CO2 SERPL-SCNC: 29 MMOL/L (ref 21–31)
COCAINE UR QL: NEGATIVE
CREAT SERPL-MCNC: 0.55 MG/DL (ref 0.6–1.2)
EOSINOPHIL # BLD AUTO: 0.1 THOUSAND/ΜL (ref 0–0.61)
EOSINOPHIL NFR BLD AUTO: 2 % (ref 0–5)
ERYTHROCYTE [DISTWIDTH] IN BLOOD BY AUTOMATED COUNT: 13.6 % (ref 11.5–14.5)
ETHANOL SERPL-MCNC: 168.5 MG/DL
ETHANOL SERPL-MCNC: 45.7 MG/DL
GFR SERPL CREATININE-BSD FRML MDRD: 112 ML/MIN/1.73SQ M
GLUCOSE SERPL-MCNC: 85 MG/DL (ref 65–99)
HCG SERPL QL: NEGATIVE
HCT VFR BLD AUTO: 42 % (ref 34.8–46.1)
HGB BLD-MCNC: 14.8 G/DL (ref 12–16)
LIPASE SERPL-CCNC: 16 U/L (ref 11–82)
LYMPHOCYTES # BLD AUTO: 2.4 THOUSANDS/ΜL (ref 0.6–4.47)
LYMPHOCYTES NFR BLD AUTO: 38 % (ref 21–51)
MCH RBC QN AUTO: 32.5 PG (ref 26–34)
MCHC RBC AUTO-ENTMCNC: 35.2 G/DL (ref 31–37)
MCV RBC AUTO: 92 FL (ref 81–99)
METHADONE UR QL: NEGATIVE
MONOCYTES # BLD AUTO: 0.5 THOUSAND/ΜL (ref 0.17–1.22)
MONOCYTES NFR BLD AUTO: 8 % (ref 2–12)
NEUTROPHILS # BLD AUTO: 3.3 THOUSANDS/ΜL (ref 1.4–6.5)
NEUTS SEG NFR BLD AUTO: 52 % (ref 42–75)
NRBC BLD AUTO-RTO: 0 /100 WBCS
OPIATES UR QL SCN: NEGATIVE
PCP UR QL: NEGATIVE
PLATELET # BLD AUTO: 254 THOUSANDS/UL (ref 149–390)
PMV BLD AUTO: 6.9 FL (ref 8.6–11.7)
POTASSIUM SERPL-SCNC: 3.9 MMOL/L (ref 3.5–5.5)
PROT SERPL-MCNC: 7 G/DL (ref 6.4–8.9)
RBC # BLD AUTO: 4.55 MILLION/UL (ref 3.9–5.2)
S PYO AG THROAT QL: NEGATIVE
SODIUM SERPL-SCNC: 134 MMOL/L (ref 134–143)
THC UR QL: NEGATIVE
WBC # BLD AUTO: 6.3 THOUSAND/UL (ref 4.8–10.8)

## 2018-08-17 PROCEDURE — 80053 COMPREHEN METABOLIC PANEL: CPT | Performed by: EMERGENCY MEDICINE

## 2018-08-17 PROCEDURE — 84703 CHORIONIC GONADOTROPIN ASSAY: CPT | Performed by: EMERGENCY MEDICINE

## 2018-08-17 PROCEDURE — 87430 STREP A AG IA: CPT | Performed by: EMERGENCY MEDICINE

## 2018-08-17 PROCEDURE — 85025 COMPLETE CBC W/AUTO DIFF WBC: CPT | Performed by: EMERGENCY MEDICINE

## 2018-08-17 PROCEDURE — 96374 THER/PROPH/DIAG INJ IV PUSH: CPT

## 2018-08-17 PROCEDURE — 83690 ASSAY OF LIPASE: CPT | Performed by: EMERGENCY MEDICINE

## 2018-08-17 PROCEDURE — 80307 DRUG TEST PRSMV CHEM ANLYZR: CPT | Performed by: EMERGENCY MEDICINE

## 2018-08-17 PROCEDURE — 96375 TX/PRO/DX INJ NEW DRUG ADDON: CPT

## 2018-08-17 PROCEDURE — 96361 HYDRATE IV INFUSION ADD-ON: CPT

## 2018-08-17 PROCEDURE — 80320 DRUG SCREEN QUANTALCOHOLS: CPT | Performed by: EMERGENCY MEDICINE

## 2018-08-17 PROCEDURE — 87070 CULTURE OTHR SPECIMN AEROBIC: CPT | Performed by: EMERGENCY MEDICINE

## 2018-08-17 PROCEDURE — 99285 EMERGENCY DEPT VISIT HI MDM: CPT

## 2018-08-17 PROCEDURE — 96360 HYDRATION IV INFUSION INIT: CPT

## 2018-08-17 PROCEDURE — 36415 COLL VENOUS BLD VENIPUNCTURE: CPT | Performed by: EMERGENCY MEDICINE

## 2018-08-17 RX ORDER — HYDROXYZINE HYDROCHLORIDE 25 MG/1
25 TABLET, FILM COATED ORAL EVERY 6 HOURS PRN
Status: DISCONTINUED | OUTPATIENT
Start: 2018-08-17 | End: 2018-08-23 | Stop reason: HOSPADM

## 2018-08-17 RX ORDER — ACETAMINOPHEN 325 MG/1
325 TABLET ORAL EVERY 6 HOURS PRN
Status: DISCONTINUED | OUTPATIENT
Start: 2018-08-17 | End: 2018-08-23 | Stop reason: HOSPADM

## 2018-08-17 RX ORDER — ONDANSETRON 2 MG/ML
4 INJECTION INTRAMUSCULAR; INTRAVENOUS ONCE
Status: COMPLETED | OUTPATIENT
Start: 2018-08-17 | End: 2018-08-17

## 2018-08-17 RX ORDER — RISPERIDONE 0.5 MG/1
0.5 TABLET, FILM COATED ORAL 2 TIMES DAILY
Refills: 2 | COMMUNITY
Start: 2018-07-10 | End: 2018-08-23 | Stop reason: HOSPADM

## 2018-08-17 RX ORDER — NICOTINE 21 MG/24HR
21 PATCH, TRANSDERMAL 24 HOURS TRANSDERMAL ONCE
Status: COMPLETED | OUTPATIENT
Start: 2018-08-17 | End: 2018-08-18

## 2018-08-17 RX ORDER — GABAPENTIN 400 MG/1
400 CAPSULE ORAL 4 TIMES DAILY
Status: DISCONTINUED | OUTPATIENT
Start: 2018-08-17 | End: 2018-08-23 | Stop reason: HOSPADM

## 2018-08-17 RX ORDER — BENZTROPINE MESYLATE 1 MG/1
1 TABLET ORAL 2 TIMES DAILY
Refills: 2 | COMMUNITY
Start: 2018-07-10 | End: 2018-08-23 | Stop reason: HOSPADM

## 2018-08-17 RX ORDER — TRAZODONE HYDROCHLORIDE 50 MG/1
50 TABLET ORAL
Status: DISCONTINUED | OUTPATIENT
Start: 2018-08-17 | End: 2018-08-23 | Stop reason: HOSPADM

## 2018-08-17 RX ORDER — SODIUM CHLORIDE 9 MG/ML
250 INJECTION, SOLUTION INTRAVENOUS CONTINUOUS
Status: DISCONTINUED | OUTPATIENT
Start: 2018-08-17 | End: 2018-08-23 | Stop reason: HOSPADM

## 2018-08-17 RX ORDER — LORAZEPAM 2 MG/ML
1 INJECTION INTRAMUSCULAR ONCE
Status: COMPLETED | OUTPATIENT
Start: 2018-08-17 | End: 2018-08-17

## 2018-08-17 RX ORDER — BENZTROPINE MESYLATE 1 MG/1
2 TABLET ORAL EVERY 6 HOURS PRN
Status: DISCONTINUED | OUTPATIENT
Start: 2018-08-17 | End: 2018-08-23 | Stop reason: HOSPADM

## 2018-08-17 RX ORDER — BENZTROPINE MESYLATE 1 MG/ML
2 INJECTION INTRAMUSCULAR; INTRAVENOUS EVERY 6 HOURS PRN
Status: DISCONTINUED | OUTPATIENT
Start: 2018-08-17 | End: 2018-08-23 | Stop reason: HOSPADM

## 2018-08-17 RX ORDER — MAGNESIUM HYDROXIDE/ALUMINUM HYDROXICE/SIMETHICONE 120; 1200; 1200 MG/30ML; MG/30ML; MG/30ML
15 SUSPENSION ORAL EVERY 4 HOURS PRN
Status: DISCONTINUED | OUTPATIENT
Start: 2018-08-17 | End: 2018-08-23 | Stop reason: HOSPADM

## 2018-08-17 RX ORDER — BENZONATATE 100 MG/1
100 CAPSULE ORAL 3 TIMES DAILY PRN
Status: DISCONTINUED | OUTPATIENT
Start: 2018-08-17 | End: 2018-08-23 | Stop reason: HOSPADM

## 2018-08-17 RX ADMIN — SODIUM CHLORIDE 250 ML/HR: 0.9 INJECTION, SOLUTION INTRAVENOUS at 13:29

## 2018-08-17 RX ADMIN — BENZONATATE 100 MG: 100 CAPSULE ORAL at 22:03

## 2018-08-17 RX ADMIN — ONDANSETRON 4 MG: 2 INJECTION, SOLUTION INTRAMUSCULAR; INTRAVENOUS at 16:42

## 2018-08-17 RX ADMIN — NICOTINE 21 MG: 21 PATCH, EXTENDED RELEASE TRANSDERMAL at 16:06

## 2018-08-17 RX ADMIN — GABAPENTIN 400 MG: 400 CAPSULE ORAL at 22:03

## 2018-08-17 RX ADMIN — LORAZEPAM 1 MG: 2 INJECTION INTRAMUSCULAR; INTRAVENOUS at 16:06

## 2018-08-17 NOTE — ED NOTES
CIS referred pt to [facility] for bed review, Oretha Kehr, RN to return call once [facility] has accepted or denied
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
## 2018-08-17 NOTE — LETTER
August 23, 2018     [facility] ACT Team    Patient: Gualberto Carvalho   YOB: 1970   Date of Visit: 8/17/2018       Dear Dr Jaylyn Parada:    Thank you for referring Gualberto Cornea to me for evaluation  Below are the relevant portions of my H&P  If you have questions, please do not hesitate to call me  I look forward to following Skylar Cohen along with you  Sincerely,        No name on file          CC: No Recipients
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
## 2018-08-17 NOTE — ED NOTES
Spoke with patient's ICM from [facility], Guerda Church stated he is in agreement with hospitalization  When patient is medically cleared, and is not willingly to sign a voluntary admission Guerda Church would like to be contacted to completed an involuntary admission
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
## 2018-08-17 NOTE — ED NOTES
Pt changed into paper scrubs  Belongings include 2 tote bags and a suitcase    Placed in med room     [facility], [address] [place] [address]  08/17/18 8526

## 2018-08-18 PROBLEM — K59.00 CONSTIPATION: Status: ACTIVE | Noted: 2017-12-27

## 2018-08-18 PROBLEM — E53.9 DEFICIENCY OF VITAMIN B: Status: ACTIVE | Noted: 2017-12-20

## 2018-08-18 PROBLEM — J01.90 ACUTE SINUSITIS: Status: ACTIVE | Noted: 2017-12-20

## 2018-08-18 PROBLEM — F43.10 PTSD (POST-TRAUMATIC STRESS DISORDER): Status: ACTIVE | Noted: 2017-07-06

## 2018-08-18 PROBLEM — F41.9 ANXIETY: Status: ACTIVE | Noted: 2017-07-06

## 2018-08-18 PROBLEM — E78.2 MIXED HYPERLIPIDEMIA: Status: ACTIVE | Noted: 2017-10-31

## 2018-08-18 PROCEDURE — 99222 1ST HOSP IP/OBS MODERATE 55: CPT | Performed by: PSYCHIATRY & NEUROLOGY

## 2018-08-18 RX ORDER — LORAZEPAM 0.5 MG/1
0.5 TABLET ORAL 2 TIMES DAILY
Status: DISCONTINUED | OUTPATIENT
Start: 2018-08-18 | End: 2018-08-23 | Stop reason: HOSPADM

## 2018-08-18 RX ORDER — RISPERIDONE 0.5 MG/1
0.5 TABLET, FILM COATED ORAL 2 TIMES DAILY
Status: DISCONTINUED | OUTPATIENT
Start: 2018-08-18 | End: 2018-08-18

## 2018-08-18 RX ORDER — RISPERIDONE 0.5 MG/1
0.5 TABLET, FILM COATED ORAL
Status: DISCONTINUED | OUTPATIENT
Start: 2018-08-19 | End: 2018-08-18

## 2018-08-18 RX ORDER — RISPERIDONE 0.5 MG/1
0.5 TABLET, FILM COATED ORAL 3 TIMES DAILY
Status: DISCONTINUED | OUTPATIENT
Start: 2018-08-18 | End: 2018-08-21

## 2018-08-18 RX ORDER — BENZTROPINE MESYLATE 1 MG/1
1 TABLET ORAL 2 TIMES DAILY
Status: DISCONTINUED | OUTPATIENT
Start: 2018-08-18 | End: 2018-08-22

## 2018-08-18 RX ORDER — NICOTINE 21 MG/24HR
21 PATCH, TRANSDERMAL 24 HOURS TRANSDERMAL ONCE
Status: COMPLETED | OUTPATIENT
Start: 2018-08-18 | End: 2018-08-19

## 2018-08-18 RX ADMIN — GABAPENTIN 400 MG: 400 CAPSULE ORAL at 09:05

## 2018-08-18 RX ADMIN — BENZTROPINE MESYLATE 1 MG: 1 TABLET ORAL at 17:15

## 2018-08-18 RX ADMIN — HYDROXYZINE HYDROCHLORIDE 25 MG: 25 TABLET ORAL at 09:05

## 2018-08-18 RX ADMIN — GABAPENTIN 400 MG: 400 CAPSULE ORAL at 17:15

## 2018-08-18 RX ADMIN — GABAPENTIN 400 MG: 400 CAPSULE ORAL at 20:56

## 2018-08-18 RX ADMIN — RISPERIDONE 3 MG: 1 TABLET ORAL at 20:57

## 2018-08-18 RX ADMIN — ACETAMINOPHEN 325 MG: 325 TABLET ORAL at 05:53

## 2018-08-18 RX ADMIN — LORAZEPAM 0.5 MG: 0.5 TABLET ORAL at 14:29

## 2018-08-18 RX ADMIN — NICOTINE 21 MG: 21 PATCH, EXTENDED RELEASE TRANSDERMAL at 14:29

## 2018-08-18 RX ADMIN — GABAPENTIN 400 MG: 400 CAPSULE ORAL at 12:05

## 2018-08-18 RX ADMIN — RISPERIDONE 0.5 MG: 0.5 TABLET ORAL at 14:29

## 2018-08-18 RX ADMIN — LORAZEPAM 0.5 MG: 0.5 TABLET ORAL at 20:57
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
## 2018-08-18 NOTE — PLAN OF CARE
Alteration in Thoughts and Perception     Treatment Goal: Gain control of psychotic behaviors/thinking, reduce/eliminate presenting symptoms and demonstrate improved reality functioning upon discharge Progressing     Verbalize thoughts and feelings Progressing     Refrain from acting on delusional thinking/internal stimuli Progressing     Agree to be compliant with medication regime, as prescribed and report medication side effects Progressing     Attend and participate in unit activities, including therapeutic, recreational, and educational groups Progressing     Recognize dysfunctional thoughts, communicate reality-based thoughts at the time of discharge Progressing     Complete daily ADLs, including personal hygiene independently, as able Progressing        Anxiety     Anxiety is at manageable level Progressing        Depression     Treatment Goal: Demonstrate behavioral control of depressive symptoms, verbalize feelings of improved mood/affect, and adopt new coping skills prior to discharge Progressing     Verbalize thoughts and feelings Progressing     Refrain from harming self Progressing     Refrain from isolation Progressing     Refrain from self-neglect Progressing     Attend and participate in unit activities, including therapeutic, recreational, and educational groups Progressing     Complete daily ADLs, including personal hygiene independently, as able Progressing        Ineffective Coping     Cooperates with admission process Progressing     Identifies ineffective coping skills Progressing     Identifies healthy coping skills Progressing     Demonstrates healthy coping skills Progressing     Participates in unit activities Progressing     Patient/Family participate in treatment and DC plans Progressing     Patient/Family verbalizes awareness of resources Progressing     Understands least restrictive measures Progressing        Risk for Self Injury/Neglect     Treatment Goal: Remain safe during length of stay, learn and adopt new coping skills, and be free of self-injurious ideation, impulses and acts at the time of discharge Progressing     Verbalize thoughts and feelings Progressing     Refrain from harming self Progressing     Attend and participate in unit activities, including therapeutic, recreational, and educational groups Progressing     Recognize maladaptive responses and adopt new coping mechanisms Progressing     Complete daily ADLs, including personal hygiene independently, as able Progressing

## 2018-08-18 NOTE — ED NOTES
Insurance Authorization:   Phone call placed to [facility]  Phone number: [phone]  Spoke to Carolyn Carbone  4 days approved  Level of care: Inpatient  Review on 8/20/18  Authorization # [id number]

## 2018-08-19 LAB — BACTERIA THROAT CULT: NORMAL

## 2018-08-19 PROCEDURE — 99232 SBSQ HOSP IP/OBS MODERATE 35: CPT | Performed by: PSYCHIATRY & NEUROLOGY

## 2018-08-19 RX ORDER — HYDROXYZINE HYDROCHLORIDE 25 MG/1
25 TABLET, FILM COATED ORAL 2 TIMES DAILY
Status: DISCONTINUED | OUTPATIENT
Start: 2018-08-19 | End: 2018-08-23 | Stop reason: HOSPADM

## 2018-08-19 RX ADMIN — BENZTROPINE MESYLATE 1 MG: 1 TABLET ORAL at 08:16

## 2018-08-19 RX ADMIN — LORAZEPAM 0.5 MG: 0.5 TABLET ORAL at 08:17

## 2018-08-19 RX ADMIN — GABAPENTIN 400 MG: 400 CAPSULE ORAL at 12:14

## 2018-08-19 RX ADMIN — GABAPENTIN 400 MG: 400 CAPSULE ORAL at 21:21

## 2018-08-19 RX ADMIN — GABAPENTIN 400 MG: 400 CAPSULE ORAL at 08:16

## 2018-08-19 RX ADMIN — BENZTROPINE MESYLATE 1 MG: 1 TABLET ORAL at 17:29

## 2018-08-19 RX ADMIN — RISPERIDONE 0.5 MG: 0.5 TABLET ORAL at 17:29

## 2018-08-19 RX ADMIN — HYDROXYZINE HYDROCHLORIDE 25 MG: 25 TABLET ORAL at 10:30

## 2018-08-19 RX ADMIN — HYDROXYZINE HYDROCHLORIDE 25 MG: 25 TABLET ORAL at 01:54

## 2018-08-19 RX ADMIN — BENZONATATE 100 MG: 100 CAPSULE ORAL at 15:24

## 2018-08-19 RX ADMIN — RISPERIDONE 0.5 MG: 0.5 TABLET ORAL at 08:17

## 2018-08-19 RX ADMIN — RISPERIDONE 0.5 MG: 0.5 TABLET ORAL at 12:14

## 2018-08-19 RX ADMIN — LORAZEPAM 0.5 MG: 0.5 TABLET ORAL at 21:21

## 2018-08-19 RX ADMIN — GABAPENTIN 400 MG: 400 CAPSULE ORAL at 17:29

## 2018-08-19 RX ADMIN — HYDROXYZINE HYDROCHLORIDE 25 MG: 25 TABLET ORAL at 17:29

## 2018-08-19 RX ADMIN — RISPERIDONE 3 MG: 1 TABLET ORAL at 21:21

## 2018-08-19 NOTE — PROGRESS NOTES
Patient has been calm and controled in the community for the afternoon  Patient previously had atarax and has since been able to calm self  Patient was social during lunch and able to converse with other patients  Patient has irritable underlying edge but is cooperative with direction and able to make needs known to staff appropriately  Good eye contact when speaking  Affect flat  Patient denies A/V hallucinations but appears internally preoccupied  Will continue to monitor for safety

## 2018-08-19 NOTE — PROGRESS NOTES
Progress Note - Navjot Lamb 53 y o  female MRN: [id number]  Unit/Bed#: [facility] 220-02 Encounter: [id number]    Assessment/Plan   Principal Problem:    Schizoaffective disorder [facility])   This is a 25 minutes psychiatric progress note on patient  15 minutes were spent in coordination and care of treatment of this patient  Coordination of care consisted me with the nursing staff discussed patient's progress, documentation, medication, orders and behaviors over the last 24 hours  Per the nursing staff, the patient is eating 100% of her meals she did sleep fairly well through the night  On examination today, the patient does scream derogatory language of people she is preoccupied with her thoughts she is has auditory hallucinations and she denies them and then she is able to calm herself down  She did respond fairly well to some Atarax or going to make it a regular dose of twice a day  She is in agreement  She continues to require inpatient care and treatment for her mood instability lability paranoid psychosis  Behavior over the last 24 hours:  unchanged  Sleep: normal  Appetite: normal  Medication side effects: No  ROS: no complaints    Mental Status Evaluation:  Appearance:  casually dressed   Behavior:  evasive   Speech:  pressured   Mood:  labile   Affect:  labile   Thought Process:  disorganized and flight of ideas   Thought Content:  delusions  obsessive/rumination   Perceptual Disturbances: Auditory hallucinations without commands   Risk Potential: not presently   Sensorium:  person, place and time/date   Cognition:  grossly intact   Consciousness:  alert and awake    Attention: attention span appeared shorter than expected for age   Insight:  poor   Judgment: poor   Gait/Station: normal gait/station   Motor Activity: no abnormal movements     Progress Toward Goals: Poor    Recommended Treatment: Continue with group therapy, milieu therapy and occupational therapy        Risks, benefits and possible side effects of Medications:   Risks, benefits, and possible side effects of medications explained to patient and patient verbalizes understanding  Medications: planned medication changes: add Atarax  Labs: reviewed    Counseling / Coordination of Care  Total floor / unit time spent today 25 minutes  Greater than 50% of total time was spent with the patient and / or family counseling and / or coordination of care   A description of the counseling / coordination of care: 25

## 2018-08-19 NOTE — PROGRESS NOTES
Patient withdrawn to her room for the majority of the evening shift  She reports feeling tired as her body gets used to being back on medications  Stated she is withdrawn to her room due to the fatigued feeling versus a need to isolate  Denies s/i  Denies a/v  She indicated that she typically experiences a decrease in s/i and a/v while she is hospitalized as she feels safe when she is inpatient  Good insight  Aware that stress causes an increase in her symptoms  Patient expressed interest in a longer term type of care  Says she feels safer when she is supervised and due to the long term chronic nature of her issues she would like to discuss options with SW  Also interested in minipress for nightmares  Will pass info along  Patient was cooperative with med pass  No irritability or outbursts  Will continue to monitor

## 2018-08-19 NOTE — PROGRESS NOTES
Patient has been sleeping for the majority of the night  She was up for approximately 1 5 hours during which time she paced the halls  Requested and received atarax at 0134 for anxiety with good effect    Trinh Haley back to sleep shortly thereafter

## 2018-08-19 NOTE — PROGRESS NOTES
Patient continues to be cooperative but irritable and has moments of screaming out  Medication compliant  Patient denies A/V hallucinations but seems preoccupied with thoughts  Patient screams derogatory language from room every few minutes, when pt is asked about whats going on pt responds " nothing Im fine " Calm and controled  Patient verbalized walking as a coping skill and witnessed pt pacing the halls  Patient denies SI and HI  Affect flat  Patient verbalized feeling more controled on medications but does not appear that thoughts are controled by episodes of acting out  Patient requested a PRN medication,  provided pt with PRN Atarax  Patient continues to pace the halls  Patient is easily redirectable  Will continue to monitor

## 2018-08-19 NOTE — PLAN OF CARE
Patient is withdrawn to room, needing redirection at time but taking medications as prescribed by the provider and taking redirection well  Encouraged patient to participate in group therapy and unit activities

## 2018-08-20 PROCEDURE — 99231 SBSQ HOSP IP/OBS SF/LOW 25: CPT | Performed by: PSYCHIATRY & NEUROLOGY

## 2018-08-20 RX ADMIN — HYDROXYZINE HYDROCHLORIDE 25 MG: 25 TABLET ORAL at 15:38

## 2018-08-20 RX ADMIN — LORAZEPAM 0.5 MG: 0.5 TABLET ORAL at 08:37

## 2018-08-20 RX ADMIN — BENZONATATE 100 MG: 100 CAPSULE ORAL at 03:17

## 2018-08-20 RX ADMIN — GABAPENTIN 400 MG: 400 CAPSULE ORAL at 17:15

## 2018-08-20 RX ADMIN — RISPERIDONE 0.5 MG: 0.5 TABLET ORAL at 12:36

## 2018-08-20 RX ADMIN — HYDROXYZINE HYDROCHLORIDE 25 MG: 25 TABLET ORAL at 08:37

## 2018-08-20 RX ADMIN — BENZTROPINE MESYLATE 1 MG: 1 TABLET ORAL at 17:16

## 2018-08-20 RX ADMIN — GABAPENTIN 400 MG: 400 CAPSULE ORAL at 12:36

## 2018-08-20 RX ADMIN — LORAZEPAM 0.5 MG: 0.5 TABLET ORAL at 20:42

## 2018-08-20 RX ADMIN — MAGNESIUM HYDROXIDE 20 ML: 400 SUSPENSION ORAL at 03:18

## 2018-08-20 RX ADMIN — BENZTROPINE MESYLATE 1 MG: 1 TABLET ORAL at 08:37

## 2018-08-20 RX ADMIN — HYDROXYZINE HYDROCHLORIDE 25 MG: 25 TABLET ORAL at 17:16

## 2018-08-20 RX ADMIN — RISPERIDONE 0.5 MG: 0.5 TABLET ORAL at 08:37

## 2018-08-20 RX ADMIN — GABAPENTIN 400 MG: 400 CAPSULE ORAL at 08:37

## 2018-08-20 RX ADMIN — GABAPENTIN 400 MG: 400 CAPSULE ORAL at 20:41

## 2018-08-20 RX ADMIN — RISPERIDONE 0.5 MG: 0.5 TABLET ORAL at 17:15

## 2018-08-20 RX ADMIN — RISPERIDONE 3 MG: 1 TABLET ORAL at 20:42

## 2018-08-20 NOTE — SOCIAL WORK
SW contacted [facility] Phone #[phone],  left message on answering machine for Errol (ACT) team to call [facility] with any questions or concerns

## 2018-08-20 NOTE — PROGRESS NOTES
Patient slept for the first part of the night but woke up around 0300  She typically does wake on the early side  Again reported bad nightmares and as a result does not want to go back to sleep  Pacing the halls as a coping skill  Pleasant  No irritability  Now states she feels ready for d/c as she feels her meds have started working  Received prn tessalon pearls at 0300 and MOM for c/o constipation,  Patient reporting new onset congestion   Requesting something from medical  Will pass along

## 2018-08-20 NOTE — PROGRESS NOTES
Pt is visible on the unit, restless but controled, no out burst or talking to self  Patient continues to be easily distracted  Patient is withdrawn to self while pacing the halls  Cooperative with medications  Patient verbalized feeling like she does well on the atarax  Positive for morning meal  Labile at times briefly  Alert and oriented  Able to make needs known  No signs or symptoms of distress  SP 1 and Q 15 minute checks will be maintained for patient safety  Will continue to monitor

## 2018-08-20 NOTE — PLAN OF CARE
Problem: Nutrition/Hydration-ADULT  Goal: Nutrient/Hydration intake appropriate for improving, restoring or maintaining nutritional needs  Monitor and assess patient's nutrition/hydration status for malnutrition (ex- brittle hair, bruises, dry skin, pale skin and conjunctiva, muscle wasting, smooth red tongue, and disorientation)  Collaborate with interdisciplinary team and initiate plan and interventions as ordered  Monitor patient's weight and dietary intake as ordered or per policy  Utilize nutrition screening tool and intervene per policy  Determine patient's food preferences and provide high-protein, high-caloric foods as appropriate  INTERVENTIONS:  - Monitor oral intake, urinary output, labs, and treatment plans  - Assess nutrition and hydration status and recommend course of action  - Evaluate amount of meals eaten  - Assist patient with eating if necessary   - Allow adequate time for meals  - Recommend/ encourage appropriate diets, oral nutritional supplements, and vitamin/mineral supplements  - Order, calculate, and assess calorie counts as needed  - Recommend, monitor, and adjust tube feedings and TPN/PPN based on assessed needs  - Assess need for intravenous fluids  - Provide specific nutrition/hydration education as appropriate  - Include patient/family/caregiver in decisions related to nutrition  Outcome: Progressing  Goal: Pt will continue to consume 100% of meals and avoid wt loss by next review

## 2018-08-20 NOTE — PROGRESS NOTES
As has been typical for patient, she spent the evening shift withdrawn to herself and in her room  Did not attend any of the evening unit activities  Sleptof and on throughout the evening  She did come out for HS medications,  Denies any current s/i or thoughts to self harm  Also denies any current a/v  Continues to have an underlying irritable edge  Continues to limit test but has been accepting of limits presented by staff

## 2018-08-20 NOTE — PROGRESS NOTES
Progress Note - Behavioral Health     Jayjay Zhang 53 y o  female MRN: [id number]   Unit/Bed#: [facility] 220-02 Encounter: [id number]    Behavior over the last 24 hours:  Anthony Harley was seen for an inpatient follow-up hospital visit this date  She presented as disheveled  She was cooperative during interview  She answered questions appropriately  She takes her psychiatric medications as prescribed  She denies side effects  Per nursing notes, she slept peacefully for the 1st part of the night but awoke around 0300   100% of her meals  She eats She continues to have an irritable edge, but is cooperative with direction and has not had any behavioral issues on the unit in the past 24 hr  She relates she wishes to be discharged because she feels she is ready to go home but was agreeable to stay for further monitoring at this time  She denied any suicidal homicidal ideation  She denied auditory and visual hallucinations although at times seemed internally preoccupied    ROS: no complaints    Mental Status Evaluation:    Appearance:   Disheveled   Behavior:  cooperative   Speech:  normal rate and volume   Mood:  dysphoric   Affect:  flat   Thought Process:  organized   Associations: intact associations   Thought Content:  no overt delusions   Perceptual Disturbances: denies auditory hallucinations when asked   Risk Potential: Suicidal ideation - None at present  Homicidal ideation - None at present  Potential for aggression - No   Sensorium:  oriented to person, place, time/date and situation   Memory:  recent and remote memory grossly intact   Consciousness:  alert and awake   Attention: decreased concentration and decreased attention span   Insight:  poor   Judgment: poor   Gait/Station: normal gait/station   Motor Activity: no abnormal movements     Vital signs in last 24 hours:    Temp:  [97 4 °F (36 3 °C)-99 1 °F (37 3 °C)] 97 4 °F (36 3 °C)  HR:  [] 85  Resp:  [16] 16  BP: (107-117)/(74-80) 107/74    Laboratory results:  I have personally reviewed all pertinent laboratory/tests results  Progress Toward Goals: progressing    Assessment/Plan   Principal Problem:    Schizoaffective disorder ([facility] )    Recommended Treatment:   1  Will continue current medications as prescribed  2   Will continue to monitor patient and adjust medications as needed  3   Will continue to encourage patient to participate in group and occupational therapy  4   Discharge disposition and planning are ongoing  All current active medications have been reviewed  Encourage group therapy, milieu therapy and occupational therapy  Behavioral Health checks every 5 minutes      Current Facility-Administered Medications:  acetaminophen 325 mg Oral Q6H PRN Li Jimenez MD    aluminum-magnesium hydroxide-simethicone 15 mL Oral Q4H PRN Li Jimenez MD    benzonatate 100 mg Oral TID PRN Leopoldo Hans, PA-C    benztropine 2 mg Intramuscular Q6H PRN Li Jimenez MD    benztropine 1 mg Oral BID JARED Augustin    benztropine 2 mg Oral Q6H PRN Li Jimenez MD    gabapentin 400 mg Oral 4x Daily Leopoldo Hans, PA-C    hydrOXYzine HCL 25 mg Oral Q6H PRN Li Jimenez MD    hydrOXYzine HCL 25 mg Oral BID Elizabeth Browne, JARED    LORazepam 0 5 mg Oral BID Elizabeth Browne, RENATANP    magnesium hydroxide 20 mL Oral Daily PRN Li Jimenez MD    risperiDONE 0 5 mg Oral TID JARED Augustin    risperiDONE 3 mg Oral HS JARED Augustin    sodium chloride 250 mL/hr Intravenous Continuous Emelia Gordon DO Last Rate: Stopped (08/17/18 0186)   traZODone 50 mg Oral HS PRN Li Jimenez MD        Risks / Benefits of Treatment:    Risks, benefits, and possible side effects of medications explained to patient and patient verbalizes understanding and agreement for treatment  Counseling / Coordination of Care:      Patient's progress discussed with staff in treatment team meeting    Medications, treatment progress and treatment plan reviewed with patient
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
## 2018-08-21 PROCEDURE — 99232 SBSQ HOSP IP/OBS MODERATE 35: CPT | Performed by: PSYCHIATRY & NEUROLOGY

## 2018-08-21 RX ORDER — RISPERIDONE 1 MG/1
1 TABLET, FILM COATED ORAL 2 TIMES DAILY
Status: DISCONTINUED | OUTPATIENT
Start: 2018-08-21 | End: 2018-08-23 | Stop reason: HOSPADM

## 2018-08-21 RX ADMIN — RISPERIDONE 3 MG: 1 TABLET ORAL at 21:38

## 2018-08-21 RX ADMIN — GABAPENTIN 400 MG: 400 CAPSULE ORAL at 17:12

## 2018-08-21 RX ADMIN — RISPERIDONE 1 MG: 1 TABLET ORAL at 17:12

## 2018-08-21 RX ADMIN — BENZONATATE 100 MG: 100 CAPSULE ORAL at 05:57

## 2018-08-21 RX ADMIN — GABAPENTIN 400 MG: 400 CAPSULE ORAL at 21:38

## 2018-08-21 RX ADMIN — LORAZEPAM 0.5 MG: 0.5 TABLET ORAL at 21:39

## 2018-08-21 RX ADMIN — HYDROXYZINE HYDROCHLORIDE 25 MG: 25 TABLET ORAL at 07:13

## 2018-08-21 RX ADMIN — BENZTROPINE MESYLATE 1 MG: 1 TABLET ORAL at 17:12

## 2018-08-21 RX ADMIN — GABAPENTIN 400 MG: 400 CAPSULE ORAL at 08:23

## 2018-08-21 RX ADMIN — RISPERIDONE 0.5 MG: 0.5 TABLET ORAL at 08:23

## 2018-08-21 RX ADMIN — BENZTROPINE MESYLATE 1 MG: 1 TABLET ORAL at 08:23

## 2018-08-21 RX ADMIN — HYDROXYZINE HYDROCHLORIDE 25 MG: 25 TABLET ORAL at 10:19

## 2018-08-21 RX ADMIN — HYDROXYZINE HYDROCHLORIDE 25 MG: 25 TABLET ORAL at 21:44

## 2018-08-21 RX ADMIN — HYDROXYZINE HYDROCHLORIDE 25 MG: 25 TABLET ORAL at 17:12

## 2018-08-21 RX ADMIN — GABAPENTIN 400 MG: 400 CAPSULE ORAL at 12:17

## 2018-08-21 RX ADMIN — LORAZEPAM 0.5 MG: 0.5 TABLET ORAL at 08:23

## 2018-08-21 RX ADMIN — ACETAMINOPHEN 325 MG: 325 TABLET ORAL at 21:43

## 2018-08-21 NOTE — PROGRESS NOTES
Patient's mood remains labile and explosive  Cooperate mostly  Lonsim Marrero enters into her bathroom and screams at times  When checked on she answers in a pleasant voice  No overt signs and symptoms of withdrawal    Complaint with medications and meals  Eye contact poor  Mild staff splitting noted

## 2018-08-21 NOTE — PROGRESS NOTES
Pt has been visible and active on the unit this shift  Has been mostly present in group programming  Walks the halls frequently  Pt is overall pleasant and brightens on approach  Pt does continue to exhibit moments of irritability  Redirects appropriately  Pt was made aware of increase risperdal regimen-pt agreeable  Pt denies SI/HI/acharya  Denies delusions though pt did express paranoid/suspicious thoughts to this writer earlier this am  Pt is able to make needs known and agrees to report changes in condition/needs to staff  Nursing will continue to monitor and assess for changes and safety with 15 minute checks

## 2018-08-21 NOTE — PLAN OF CARE
Alteration in Thoughts and Perception     Treatment Goal: Gain control of psychotic behaviors/thinking, reduce/eliminate presenting symptoms and demonstrate improved reality functioning upon discharge Progressing     Verbalize thoughts and feelings Progressing     Refrain from acting on delusional thinking/internal stimuli Progressing     Agree to be compliant with medication regime, as prescribed and report medication side effects Progressing     Attend and participate in unit activities, including therapeutic, recreational, and educational groups Progressing     Recognize dysfunctional thoughts, communicate reality-based thoughts at the time of discharge Progressing     Complete daily ADLs, including personal hygiene independently, as able Progressing        Anxiety     Anxiety is at manageable level Progressing        Depression     Treatment Goal: Demonstrate behavioral control of depressive symptoms, verbalize feelings of improved mood/affect, and adopt new coping skills prior to discharge Progressing     Verbalize thoughts and feelings Progressing     Refrain from harming self Progressing     Refrain from isolation Progressing     Refrain from self-neglect Progressing     Attend and participate in unit activities, including therapeutic, recreational, and educational groups Progressing     Complete daily ADLs, including personal hygiene independently, as able [address] Discharge to post-acute care or home with appropriate resources Progressing        Ineffective Coping     Cooperates with admission process Progressing     Identifies ineffective coping skills Progressing     Identifies healthy coping skills Progressing     Demonstrates healthy coping skills Progressing     Participates in unit activities Progressing     Patient/Family participate in treatment and DC plans Progressing     Patient/Family verbalizes awareness of resources Progressing     Understands least restrictive measures Progressing        Nutrition/Hydration-ADULT     Nutrient/Hydration intake appropriate for improving, restoring or maintaining nutritional needs Progressing          Pt continues to shown progression towards completing the above listed goals    Nursing continues to encourage pt to seek out staff to discuss feelings in order to control angry outbursts

## 2018-08-21 NOTE — PROGRESS NOTES
Progress Note - [address] 53 y o  female MRN: [id number]   Unit/Bed#: [facility] 218-01 Encounter: [id number]    Behavior over the last 24 hours:  Mallika Hopkins was irritable and argumentative during psychiatric interview  She states there is nothing wrong with her and she does not need to be here  She insisted that she was told she was leaving this date, and when advised that this was not the case became increasingly agitated  Importance of mood stabilization before discharge was discussed  Per nursing report, patient has continued to be labile and has frequent verbal outbursts  She is eating 100% of her meals and sleeping without difficulty  She takes her psychiatric medications as prescribed  Today, she was again demanding asking when she can leave repeatedly  Boundaries discussed  Patient verbalized understanding of both boundaries and discharge criteria      ROS: no complaints    Mental Status Evaluation:    Appearance:  casually dressed, dressed appropriately   Behavior:  angry, demanding   Speech:  normal rate, loud   Mood:  labile, irritable   Affect:  increased in intensity   Thought Process:  organized, coherent   Associations: intact associations   Thought Content:  no overt delusions   Perceptual Disturbances: denies auditory hallucinations when asked   Risk Potential: Suicidal ideation - None  Homicidal ideation - None  Potential for aggression - No   Sensorium:  oriented to person, place and time/date   Memory:  recent and remote memory grossly intact   Consciousness:  alert and awake   Attention: poor concentration and poor attention span   Insight:  poor   Judgment: poor   Gait/Station: normal gait/station   Motor Activity: no abnormal movements     Vital signs in last 24 hours:    Temp:  [96 2 °F (35 7 °C)-97 7 °F (36 5 °C)] 96 2 °F (35 7 °C)  HR:  [75-83] 75  Resp:  [16-18] 18  BP: (113-146)/(60-78) 113/78    Laboratory results:  I have personally reviewed all pertinent laboratory/tests results  Progress Toward Goals: progressing    Assessment/Plan   Principal Problem:    Schizoaffective disorder ([facility] [address] )    Recommended Treatment:   1  Will increase risperidone from 0 5 mg 3 times daily to 1 mg twice daily and 3 mg at bedtime  2   Will continue to monitor patient and adjust medications as needed  3   Discharge disposition planning or ongoing  All current active medications have been reviewed  Encourage group therapy, milieu therapy and occupational therapy  Behavioral Health checks every 5 minutes      Current Facility-Administered Medications:  acetaminophen 325 mg Oral Q6H PRN Manpreet Fraser MD    aluminum-magnesium hydroxide-simethicone 15 mL Oral Q4H PRN Manpreet Fraser MD    benzonatate 100 mg Oral TID PRN Akin Torres PA-C    benztropine 2 mg Intramuscular Q6H PRN Manpreet Fraser MD    benztropine 1 mg Oral BID Elizabeth Browne, JARED    benztropine 2 mg Oral Q6H PRN Manpreet Fraser MD    gabapentin 400 mg Oral 4x Daily Akin Torres PA-C    hydrOXYzine HCL 25 mg Oral Q6H PRN Manpreet Fraser MD    hydrOXYzine HCL 25 mg Oral BID JARED Augustin    LORazepam 0 5 mg Oral BID Elizabeth Browne, RENATANP    magnesium hydroxide 20 mL Oral Daily PRN Manpreet Fraser MD    risperiDONE 1 mg Oral BID Elizabeth Browne, JARED    risperiDONE 3 mg Oral HS JARED Augustin    sodium chloride 250 mL/hr Intravenous Continuous Latrell Simone, DO Last Rate: Stopped (08/17/18 6977)   traZODone 50 mg Oral HS PRN Manpreet Fraser MD        Risks / Benefits of Treatment:    Risks, benefits, and possible side effects of medications explained to patient and patient verbalizes understanding and agreement for treatment  Counseling / Coordination of Care:      Patient's progress discussed with staff in treatment team meeting  Medications, treatment progress and treatment plan reviewed with patient

## 2018-08-21 NOTE — PROGRESS NOTES
Patient had no further yelling  No signs of active withdrawal  Patient observed sleeping during most Q15 minute safety checks (second portion of shift)  No suicidal ideations, acting out or homicidal behaviors  No change in medical condition or complaints voiced  Fluids maintained at bedside to promote hydration

## 2018-08-22 PROCEDURE — 99231 SBSQ HOSP IP/OBS SF/LOW 25: CPT | Performed by: PSYCHIATRY & NEUROLOGY

## 2018-08-22 RX ORDER — BENZTROPINE MESYLATE 1 MG/1
1 TABLET ORAL DAILY
Status: DISCONTINUED | OUTPATIENT
Start: 2018-08-23 | End: 2018-08-22

## 2018-08-22 RX ORDER — BENZTROPINE MESYLATE 1 MG/1
1 TABLET ORAL ONCE
Status: COMPLETED | OUTPATIENT
Start: 2018-08-22 | End: 2018-08-22

## 2018-08-22 RX ORDER — BENZTROPINE MESYLATE 1 MG/1
2 TABLET ORAL DAILY
Status: DISCONTINUED | OUTPATIENT
Start: 2018-08-22 | End: 2018-08-22

## 2018-08-22 RX ORDER — BENZTROPINE MESYLATE 1 MG/1
1 TABLET ORAL 3 TIMES DAILY
Status: DISCONTINUED | OUTPATIENT
Start: 2018-08-22 | End: 2018-08-23 | Stop reason: HOSPADM

## 2018-08-22 RX ADMIN — BENZTROPINE MESYLATE 1 MG: 1 TABLET ORAL at 08:40

## 2018-08-22 RX ADMIN — LORAZEPAM 0.5 MG: 0.5 TABLET ORAL at 20:41

## 2018-08-22 RX ADMIN — GABAPENTIN 400 MG: 400 CAPSULE ORAL at 20:39

## 2018-08-22 RX ADMIN — HYDROXYZINE HYDROCHLORIDE 25 MG: 25 TABLET ORAL at 17:26

## 2018-08-22 RX ADMIN — ACETAMINOPHEN 325 MG: 325 TABLET ORAL at 14:32

## 2018-08-22 RX ADMIN — GABAPENTIN 400 MG: 400 CAPSULE ORAL at 17:26

## 2018-08-22 RX ADMIN — GABAPENTIN 400 MG: 400 CAPSULE ORAL at 12:45

## 2018-08-22 RX ADMIN — HYDROXYZINE HYDROCHLORIDE 25 MG: 25 TABLET ORAL at 20:41

## 2018-08-22 RX ADMIN — ACETAMINOPHEN 325 MG: 325 TABLET ORAL at 20:41

## 2018-08-22 RX ADMIN — RISPERIDONE 3 MG: 1 TABLET ORAL at 20:40

## 2018-08-22 RX ADMIN — LORAZEPAM 0.5 MG: 0.5 TABLET ORAL at 08:39

## 2018-08-22 RX ADMIN — MAGNESIUM HYDROXIDE 20 ML: 400 SUSPENSION ORAL at 17:49

## 2018-08-22 RX ADMIN — RISPERIDONE 1 MG: 1 TABLET ORAL at 17:43

## 2018-08-22 RX ADMIN — GABAPENTIN 400 MG: 400 CAPSULE ORAL at 08:40

## 2018-08-22 RX ADMIN — BENZTROPINE MESYLATE 1 MG: 1 TABLET ORAL at 20:41

## 2018-08-22 RX ADMIN — BENZTROPINE MESYLATE 1 MG: 1 TABLET ORAL at 17:43

## 2018-08-22 RX ADMIN — RISPERIDONE 1 MG: 1 TABLET ORAL at 08:39

## 2018-08-22 RX ADMIN — HYDROXYZINE HYDROCHLORIDE 25 MG: 25 TABLET ORAL at 08:39

## 2018-08-22 NOTE — SOCIAL WORK
SETH spoke to Quan lopez at [employer] and was informed that no one from pt's team is in the office at this time  SETH explained that someone was out from the ACT to see pt the other day and we want to find out how they feel pt is doing as we are looking for discharge tomorrow  Quan lopez informed SW that she will reach out to the team and have them call SW back

## 2018-08-22 NOTE — PROGRESS NOTES
When Mark Kwon awoke complained of severe leg spasms while sleeping  Patient was observed sleeping during most Q15 minute safety checks (second portion of shift)  No suicidal ideations, acting out or homicidal behaviors  No change in medical condition or complaints voiced  Fluids maintained at bedside to promote hydration

## 2018-08-22 NOTE — PLAN OF CARE
Alteration in Thoughts and Perception     Treatment Goal: Gain control of psychotic behaviors/thinking, reduce/eliminate presenting symptoms and demonstrate improved reality functioning upon discharge Completed     Verbalize thoughts and feelings Completed     Refrain from acting on delusional thinking/internal stimuli Completed     Agree to be compliant with medication regime, as prescribed and report medication side effects Completed     Attend and participate in unit activities, including therapeutic, recreational, and educational groups Completed     Complete daily ADLs, including personal hygiene independently, as able Completed        Depression     Verbalize thoughts and feelings Completed     Refrain from harming self Completed     Refrain from isolation Completed     Refrain from self-neglect Completed     Attend and participate in unit activities, including therapeutic, recreational, and educational groups Completed     Complete daily ADLs, including personal hygiene independently, as able Completed        Ineffective Coping     Cooperates with admission process Completed     Identifies ineffective coping skills Completed     Identifies healthy coping skills Completed     Participates in unit activities Completed     Understands least restrictive measures Completed        Nutrition/Hydration-ADULT     Nutrient/Hydration intake appropriate for improving, restoring or maintaining nutritional needs Completed          Pt has progressed to the point of completing the above listed goals    Alteration in Thoughts and Perception     Recognize dysfunctional thoughts, communicate reality-based thoughts at the time of discharge Progressing        Anxiety     Anxiety is at manageable level Progressing        Depression     Treatment Goal: Demonstrate behavioral control of depressive symptoms, verbalize feelings of improved mood/affect, and adopt new coping skills prior to discharge Progressing Ineffective Coping     Demonstrates healthy coping skills Progressing     Patient/Family participate in treatment and DC plans Progressing     Patient/Family verbalizes awareness of resources Progressing          Pt continues to show progression towards completion of the above listed goals   Nursing encourages pt to continue participating in programming and communicating needs

## 2018-08-22 NOTE — PROGRESS NOTES
Progress Note - [address] 53 y o  female MRN: [id number]   Unit/Bed#: [facility] 218-01 Encounter: [id number]    Behavior over the last 24 hours:  Yodit Song was calm and cooperative during interview  Per nursing report, she is still slightly paranoid although she denied any feelings of paranoia or paranoid thoughts during interview  She has been taking her psychiatric medications as prescribed, has been eating 100% of her meals, and slept through the night without difficulty  She did complain last night of some leg spasms which have not recurred since then  During psychiatric assessment today, she still appears slightly irritable but was pleasant and cooperative throughout the conversation  She denies any suicidal or homicidal ideation as well as auditory and/or visual hallucinations  She offers no complaints at this time and relates she is looking for to being discharged when able  She has not had any behavioral issues on the unit in the past 24 hr     ROS:  Reports muscle spasms in legs      Mental Status Evaluation:    Appearance:  casually dressed, dressed appropriately   Behavior:  cooperative, calm   Speech:  normal rate and volume   Mood:  slightly irritable   Affect:  mood-congruent   Thought Process:  logical, coherent   Associations: intact associations   Thought Content:  no overt delusions   Perceptual Disturbances: denies auditory hallucinations when asked   Risk Potential: Suicidal ideation - None  Homicidal ideation - None  Potential for aggression - No   Sensorium:  oriented to person, place, time/date and situation   Memory:  recent and remote memory grossly intact   Consciousness:  alert and awake   Attention: attention span and concentration are age appropriate   Insight:  fair   Judgment: fair   Gait/Station: normal gait/station   Motor Activity: no abnormal movements     Vital signs in last 24 hours:    Temp:  [96 6 °F (35 9 °C)-97 2 °F (36 2 °C)] 96 6 °F (35 9 °C)  HR:  [75-83] 83  Resp:  [16-18] 18  BP: (110-122)/(80) 122/80    Laboratory results:  I have personally reviewed all pertinent laboratory/tests results  Progress Toward Goals: progressing    Assessment/Plan   Principal Problem:    Schizoaffective disorder ([address] )    Recommended Treatment:   1  Will increase Cogentin dose to alleviate leg spasms  2   Will continue psychiatric medications as prescribed since patient is exhibiting a positive response  3    will speak with patient's outpatient provider regarding discharge follow-up plans  4   Will continue to monitor patient and adjust medications as necessary  All current active medications have been reviewed  Encourage group therapy, milieu therapy and occupational therapy  Behavioral Health checks every 5 minutes      Current Facility-Administered Medications:  acetaminophen 325 mg Oral Q6H PRN Matthew Mcelroy MD    aluminum-magnesium hydroxide-simethicone 15 mL Oral Q4H PRN Matthew Mcelroy MD    benzonatate 100 mg Oral TID PRN Jose Manuel Aly PA-C    benztropine 2 mg Intramuscular Q6H PRN Matthew Mcelroy MD    benztropine 1 mg Oral BID Elizabeth Browne, RENATANP    benztropine 2 mg Oral Q6H PRN Matthew Mcelroy MD    gabapentin 400 mg Oral 4x Daily Jose Manuel Aly PA-C    hydrOXYzine HCL 25 mg Oral Q6H PRN Matthew Mcelroy MD    hydrOXYzine HCL 25 mg Oral BID Elizabeth Browne, RENATANP    LORazepam 0 5 mg Oral BID Elizabteh Pavan, CRNP    magnesium hydroxide 20 mL Oral Daily PRN Matthew Mcelroy MD    risperiDONE 1 mg Oral BID Elizabeth Browne, CRNP    risperiDONE 3 mg Oral HS JARED Augustin    sodium chloride 250 mL/hr Intravenous Continuous Neoma Kil, DO Last Rate: Stopped (08/17/18 7728)   traZODone 50 mg Oral HS PRN Matthew Mcelroy MD        Risks / Benefits of Treatment:    Risks, benefits, and possible side effects of medications explained to patient and patient verbalizes understanding and agreement for treatment      Counseling / Coordination of Care:      Patient's progress discussed with staff in treatment team meeting  Medications, treatment progress and treatment plan reviewed with patient

## 2018-08-22 NOTE — PROGRESS NOTES
Maintained on Q 15 minute safety checks  No acting out, suicidal ideations, and/or homicidal behaviors  No changes in medical condition or complaints voiced  Fluids maintained at bedside to promote hydration

## 2018-08-22 NOTE — PROGRESS NOTES
Pt has been visible and active on the unit this shift  Has been attending programming and socializing with select peers  Pt seeks out staff for interaction and concerns  Is calm and appropriate  Brightens on approach  Pt reports improvement in condition to the point of denying SI/HI/acharya  Pt made request for discharge today stating she is ready  Treatment team aware  Pt is for possible d/c tomorrow given good behavior, as per [facility]  Pt reported restless legs early this am following increase in risperdal yesterday  HS cogentin has been increased-pt aware  Pt is able to make needs known and agrees to report changes in condition/needs to staff  Nursing will continue to monitor and assess for changes and safety with 15 minute checks
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
## 2018-08-23 VITALS
BODY MASS INDEX: 22.94 KG/M2 | TEMPERATURE: 99.1 F | RESPIRATION RATE: 16 BRPM | DIASTOLIC BLOOD PRESSURE: 70 MMHG | OXYGEN SATURATION: 94 % | WEIGHT: 116.84 LBS | HEART RATE: 54 BPM | HEIGHT: 60 IN | SYSTOLIC BLOOD PRESSURE: 120 MMHG

## 2018-08-23 PROCEDURE — 99238 HOSP IP/OBS DSCHRG MGMT 30/<: CPT | Performed by: PSYCHIATRY & NEUROLOGY

## 2018-08-23 RX ORDER — HYDROXYZINE HYDROCHLORIDE 25 MG/1
25 TABLET, FILM COATED ORAL 2 TIMES DAILY
Qty: 30 TABLET | Refills: 0 | Status: SHIPPED | OUTPATIENT
Start: 2018-08-23 | End: 2018-10-30 | Stop reason: HOSPADM

## 2018-08-23 RX ORDER — LORAZEPAM 0.5 MG/1
0.5 TABLET ORAL 2 TIMES DAILY
Qty: 10 TABLET | Refills: 0 | Status: SHIPPED | OUTPATIENT
Start: 2018-08-23 | End: 2018-10-30 | Stop reason: HOSPADM

## 2018-08-23 RX ORDER — RISPERIDONE 3 MG/1
3 TABLET, FILM COATED ORAL
Qty: 30 TABLET | Refills: 0 | Status: SHIPPED | OUTPATIENT
Start: 2018-08-23 | End: 2018-10-30 | Stop reason: HOSPADM

## 2018-08-23 RX ORDER — BENZTROPINE MESYLATE 1 MG/1
1 TABLET ORAL 3 TIMES DAILY
Qty: 90 TABLET | Refills: 0 | Status: SHIPPED | OUTPATIENT
Start: 2018-08-23 | End: 2018-10-30 | Stop reason: HOSPADM

## 2018-08-23 RX ORDER — RISPERIDONE 1 MG/1
1 TABLET, FILM COATED ORAL 2 TIMES DAILY
Qty: 60 TABLET | Refills: 0 | Status: SHIPPED | OUTPATIENT
Start: 2018-08-23 | End: 2018-09-04

## 2018-08-23 RX ORDER — GABAPENTIN 400 MG/1
400 CAPSULE ORAL 4 TIMES DAILY
Qty: 120 CAPSULE | Refills: 0 | Status: SHIPPED | OUTPATIENT
Start: 2018-08-23 | End: 2018-10-30 | Stop reason: HOSPADM

## 2018-08-23 RX ADMIN — GABAPENTIN 400 MG: 400 CAPSULE ORAL at 12:23

## 2018-08-23 RX ADMIN — BENZTROPINE MESYLATE 1 MG: 1 TABLET ORAL at 08:42

## 2018-08-23 RX ADMIN — GABAPENTIN 400 MG: 400 CAPSULE ORAL at 08:42

## 2018-08-23 RX ADMIN — LORAZEPAM 0.5 MG: 0.5 TABLET ORAL at 08:42

## 2018-08-23 RX ADMIN — HYDROXYZINE HYDROCHLORIDE 25 MG: 25 TABLET ORAL at 08:42

## 2018-08-23 RX ADMIN — RISPERIDONE 1 MG: 1 TABLET ORAL at 08:42

## 2018-08-23 RX ADMIN — ACETAMINOPHEN 325 MG: 325 TABLET ORAL at 11:45

## 2018-08-23 NOTE — PROGRESS NOTES
Pt has been pleasant and bright today  Visible and active in the community  Fixated on d/c  Is denying any reason to continue admission as she denies SI/HI/acharya, delusions/paranoia  Pt has been medication compliant  States, "If I ever want to scream my feelings I will go into the woods to do it"  Pt discharge is expected today and she is looking forward to such  Denies concerns with leaving  Pt is able to make needs known and agrees to report changes in condition/needs to staff  Nursing will continue to monitor and assess for changes and safety with 15 minute checks

## 2018-08-23 NOTE — DISCHARGE INSTR - APPOINTMENTS
Please continue with [id number] [place] [address] Phone: [phone] Fax: [phone]) for ACT services  A member of your ACT team will follow up with you in your home within 48 hours after discharge  Your summary of care will be faxed to this provider  You indicated that [facility] [place] [address] [facility];however, you did not sign a release of information so an appointment will not be scheduled and your summary of care will not be faxed  You declined a referral for inpatient substance abuse treatment; if you change your mind please discuss with your ACT team and they can assist you with making arrangements

## 2018-08-23 NOTE — SOCIAL WORK
SW met with pt privately to complete discharge contact  Pt presents as bright with some anxiety and pressured speech  Pt reports that she feels anxious in a good way about leaving  Pt denies SI/HI/AH/VH  Pt denies depression  Pt reports that her friend will pick her up at [address] spoke to pt about ACT team; she states that they will be out tomorrow Friday August 24, 2018 to see her  Pt has no other needs or concerns  Pt stable for discharge

## 2018-08-23 NOTE — PROGRESS NOTES
Patient awoke early, excited about pending discharge  Positve about pending discharge  Controlled and cooperative  No yelling or delusional statements noted  Maintaining positive gains

## 2018-08-23 NOTE — SOCIAL WORK
SW left another message for pt's [facility] ACT team regarding pt's discharge today and requested a return phone call

## 2018-08-23 NOTE — PLAN OF CARE
Problem: Ineffective Coping  Goal: Participates in unit activities  Interventions:  - Provide therapeutic environment   - Provide required programming   - Redirect inappropriate behaviors     Outcome: Adequate for Discharge

## 2018-08-23 NOTE — PROGRESS NOTES
Behaviors were controlled  No yelling or acting out  Compliant with med and meals  Maintained on Q 15 minute safety checks  No suicidal ideations, and/or homicidal behaviors  No changes in medical condition or complaints voiced  Fluids maintained at bedside to promote hydration

## 2018-08-23 NOTE — DISCHARGE INSTRUCTIONS
Abuse of Alcohol   WHAT YOU NEED TO KNOW:   What is alcohol abuse? · Alcohol abuse is unhealthy drinking behavior  You may drink too much once a week, or continue to drink too much daily  You continue to drink even though it causes problems  The problems may include legal problems, problems at work, or problems with relationships  · If you drink too much at one time, you are binge drinking  Binge drinking is when you have a large amount of alcohol in a short time  Your blood alcohol concentrations (JO) goes above 0 08 g/dLlevel during binge drinking  For men, this usually happens with more than 4 drinks in 2 hours  For women, it is more than 3 drinks in 2 hours  A drink is 12 ounces of beer, 4 ounces of wine, or 1½ ounces of liquor  What are the signs and symptoms of alcohol abuse? Each person that abuses alcohol may have different symptoms  The following are common signs and symptoms of alcohol abuse:  · Loss of interest in activities, work, and school    · Decreased interest in family and friends    · Depression    · Constant thoughts about drinking    · Not able to control the amount you drink    · Restlessness, or erratic and violent behavior  What are the long-term effects of alcohol abuse? · Blackouts    · Memory loss    · Dementia    · Liver disease    · Thiamine (vitamin B1) deficiency  What treatments or therapies are used to treat alcohol abuse? · Detoxification (detox) and withdrawal  is a program that helps you to safely get alcohol out of your body  Detox can also help get rid of the physical need to drink  Healthcare providers monitor the physical symptoms of withdrawal  They may give you medicines to help decrease nausea, dehydration, and seizures  Healthcare providers will also monitor your blood pressure, heart and breathing rates, and your temperature  Symptoms of anxiety, depression, and suicidal thoughts are also monitored and managed during detox   Healthcare providers may give you medicines for these symptoms and therapy sessions will be available to you  Detox is usually done at a detox center or in a hospital  Healthcare providers do not recommend that you try to detox at home or by yourself  Withdrawal symptoms may become life-threatening  The center can help you find 12 step programs or an individual therapist to help with emotional support after detox  · Inpatient and outpatient treatment  focus on your personal needs to help you stop drinking  Treatment helps you understand the reasons you abuse alcohol  Counselors and therapists provide you with support and help you find ways to cope instead of drinking  You may need inpatient treatment to provide a controlled environment  You may need outpatient treatment after your inpatient treatment is complete  · Alcohol aversion therapy  takes away the desire to drink by causing a negative reaction when you drink  Healthcare providers may give you medicines that cause nausea and vomiting when you drink alcohol  They may instead give you a medicine that decreases your urge to drink alcohol  These medicines are used to help you stop drinking or reduce the amount you drink  They can also help you avoid relapse  What are the risks of alcohol abuse? Alcohol abuse increases your risk for gastrointestinal cancers, brain damage and problems with your immune system  It also increases your risk for heart, kidney, and lung damage  The risk of stroke increases with alcohol abuse  If you are pregnant and drink alcohol, you and your baby are at risk for serious health problems  Where can I find support and more information? · Alcoholics Anonymous  Web Address: http://www lester info/  · Substance Abuse and [address] [address]  Web Address: https://[employer]/  Call 911 for the following:   · You have sudden chest pain or trouble breathing      · You want to harm yourself or others  · You have a seizure or have shaking or trembling  When should I seek immediate care? · You have hallucinations (you see or hear things that are not real)  · You cannot stop vomiting or you vomit blood  When should I contact my healthcare provider? · You need help to stop drinking alcohol  · You have questions or concerns about your condition or care  CARE AGREEMENT:   You have the right to help plan your care  Learn about your health condition and how it may be treated  Discuss treatment options with your caregivers to decide what care you want to receive  You always have the right to refuse treatment  The above information is an  only  It is not intended as medical advice for individual conditions or treatments  Talk to your doctor, nurse or pharmacist before following any medical regimen to see if it is safe and effective for you  © 2017 [address] [employer] Information is for End User's use only and may not be sold, redistributed or otherwise used for commercial purposes  All illustrations and images included in CareNotes® are the copyrighted property of A D A M , Inc  or Jamari Nakul  Anxiety   WHAT YOU NEED TO KNOW:   What do I need to know about anxiety? Anxiety is a condition that causes you to feel extremely worried or nervous  The feelings are so strong that they can cause problems with your daily activities or sleep  Anxiety may be triggered by something you fear, or it may happen without a cause  Family or work stress, smoking, caffeine, and alcohol can increase your risk for anxiety  Certain medicines or health conditions can also increase your risk  Anxiety can become a long-term condition if it is not managed or treated  What other common signs and symptoms may occur with anxiety?    · Fatigue or muscle tightness     · Shaking, restlessness, or irritability     · Problems focusing     · Trouble sleeping     · Feeling jumpy, easily startled, or dizzy     · Rapid heartbeat or shortness of breath  What do I need to tell my healthcare provider about my anxiety? Tell your healthcare provider when your symptoms began and what triggers them  Tell your provider if anxiety affects your daily activities  Your provider will also ask about your medical history and if you have family members with a similar condition  Tell your provider about your past and present alcohol, nicotine, or drug use  What can I do to manage anxiety? You may get medicines to help you feel calm and relaxed, and to decrease your symptoms  Medicines are usually given together with therapy or other treatments  The following can help you manage anxiety:  · Talk to someone about your anxiety  Your healthcare provider may suggest counseling  Cognitive behavioral therapy can help you understand and change how you react to events that trigger your symptoms  You might feel more comfortable talking with a friend or family member about your anxiety  Choose someone you know will be supportive and encouraging  · Find ways to relax  Activities such as exercise, meditation, or listening to music can help you relax  Spend time with friends, or do things you enjoy  · Practice deep breathing  Deep breathing can help you relax when you feel anxious  Focus on taking slow, deep breaths several times a day, or during an anxiety attack  Breathe in through your nose and out through your mouth  · Create a regular sleep routine  Regular sleep can help you feel calmer during the day  Go to sleep and wake up at the same times every day  Do not watch television or use the computer right before bed  Your room should be comfortable, dark, and quiet  · Eat a variety of healthy foods  Healthy foods include fruits, vegetables, low-fat dairy products, lean meats, fish, whole-grain breads, and cooked beans  Healthy foods can help you feel less anxious and have more energy      · Exercise regularly  Exercise can increase your energy level  Exercise may also lift your mood and help you sleep better  Your healthcare provider can help you create an exercise plan  · Do not smoke  Nicotine and other chemicals in cigarettes and cigars can increase anxiety  Ask your healthcare provider for information if you currently smoke and need help to quit  E-cigarettes or smokeless tobacco still contain nicotine  Talk to your healthcare provider before you use these products  · Do not have caffeine  Caffeine can make your symptoms worse  Do not have foods or drinks that are meant to increase your energy level  · Limit or do not drink alcohol  Ask your healthcare provider if alcohol is safe for you  You may not be able to drink alcohol if you take certain anxiety or depression medicines  Limit alcohol to 1 drink per day if you are a woman  Limit alcohol to 2 drinks per day if you are a man  A drink of alcohol is 12 ounces of beer, 5 ounces of wine, or 1½ ounces of liquor  · Do not use drugs  Drugs can make your anxiety worse  It can also make anxiety hard to manage  Talk to your healthcare provider if you use drugs and want help to quit  Call 911 if:   · You have chest pain, tightness, or heaviness that may spread to your shoulders, arms, jaw, neck, or back  · You feel like hurting yourself or someone else  When should I contact my healthcare provider? · Your symptoms get worse or do not get better with treatment  · Your anxiety keeps you from doing your regular daily activities  · You have new symptoms since your last visit  · You have questions or concerns about your condition or care  CARE AGREEMENT:   You have the right to help plan your care  Learn about your health condition and how it may be treated  Discuss treatment options with your caregivers to decide what care you want to receive  You always have the right to refuse treatment   The above information is an  only  It is not intended as medical advice for individual conditions or treatments  Talk to your doctor, nurse or pharmacist before following any medical regimen to see if it is safe and effective for you  © 2017 [employer]  Information is for End User's use only and may not be sold, redistributed or otherwise used for commercial purposes  All illustrations and images included in CareNotes® are the copyrighted property of [employer]  or Jamari Mota  Schizoaffective Disorder   WHAT YOU NEED TO KNOW:   What is schizoaffective disorder? Schizoaffective disorder is a long-term mental illness that may change how you think, feel, and act around others  Schizoaffective disorder involves both psychosis (loss of reality), along with depression or sudheer  You may not know what is real and what is not real    What causes schizoaffective disorder? The cause of schizoaffective disorder in not known  It is thought there may be an imbalance in chemicals that help control movement, thought, and mood  What increases my risk of schizoaffective disorder? · You are female  · You were born during the winter months  · You had psychological stress from abuse, disaster, or major life change  · Your brain did not develop normally before you were born  · You have a family member with schizophrenia, a mood disorder, or schizoaffective disorder  What are the signs and symptoms of schizoaffective disorder? · Delusions: These are false ideas  You may believe that someone is spying on you, or that you are someone famous  · Hallucinations: You see, feel, taste, hear, or smell something that is not real     · Disordered thinking and speech:  When you talk, you move from one subject to another in a way that does not make sense  You may make up your own words or sounds  · Negative symptoms: These include lack of expression, eye contact, and words   You may not be able to start and continue a planned activity  You may have little interest in work or social activities  · Depression:  You are depressed most of the day or nearly every day  You may lose or gain weight, or have trouble sleeping or concentrating  There may be feeling of hopelessness and suicidal thoughts  · Manic behavior: These are periods of increased self-esteem and energy with little to no sleep  You may talk more than usual  You may have racing thoughts or be easily distracted  You may have more interest in social activities  How is schizoaffective disorder diagnosed? Your healthcare provider will perform a psychiatric assessment  He will ask if you have a history of psychological trauma, such as physical, sexual, or mental abuse  He will ask if you were given the care that you needed when you needed it  He will ask if you have a history of alcohol or drug abuse  Your healthcare provider will ask you if you want to hurt or kill yourself or others  He will also ask about your hobbies and goals, the people in your life who support you, and how you feel about treatment  The answers to these questions help healthcare providers plan your treatment  Which medicines are used to treat schizoaffective disorder? · Antipsychotics: These help decrease psychotic symptoms or severe agitation  You may need antiparkinson medicine to control muscle stiffness, twitches, and restlessness caused by antipsychotic medicines  · Antianxiety medicine: This medicine may be given to decrease anxiety and help you feel calm and relaxed  · Antidepressants: These help decrease or stop the symptoms of depression, anxiety, and behavior problems  · Mood stabilizers: These control mood swings  · Anticonvulsants: These control seizures, decrease violent behavior, and control mood swings  · Blood pressure medicines: These may be used to help decrease motor tics (uncontrolled movements)   They may also help you feel calmer, more focused, and less irritable  · Anticholinergics: This decreases the side effects of other medicines  Which therapies are used to treat schizoaffective disorder? · Assertive community treatment:  A team of healthcare providers and support groups in your community help you with your therapy  · Cognitive behavior therapy: This therapy helps you to change certain behaviors  It will help you handle symptoms such as hallucinations and delusions  It can help you learn how to get along with others, and help you cope with and handle your disease  · Compliance therapy: This is a therapy to help find ways to make it easier for you to take your medicines and get treatment  · Counseling: This helps you learn self-care, how to decrease your symptoms, and prevent them from coming back  · Family intervention: This program lets your family be part of your therapy  · Skills training: This training helps you learn how to get along with other people  You will also learn how to do everyday activities and skills you need to live on your own  · Supported employment: This is a form of therapy where you are placed into a job that fits your skills  It will help give you independence and self-confidence  · Electroconvulsive therapy: This is a type of shock therapy, also called ECT  This therapy passes a small amount of electricity through the brain  How can I help manage schizoaffective disorder? The following may help you feel better or prevent symptoms of schizoaffective disorder from coming back:  · Find support for yourself and your family:  Talk with others to help you cope with your illness better  This may also help to improve how you relate to others  · Keep all medical appointments: This will help manage your disease and the side-effects from medicines you may be taking  · Take your medicines as directed:  Put your medicines in a pillbox placed in an area you can easily see   Use a watch with an alarm to help you remember when it is time to take your medicine  Tell your healthcare provider if you know or think you might be pregnant because your medication could affect the baby  Do not stop taking your medicines without your healthcare provider's okay  A sudden stop can cause serious medical problems  · Watch for early signs of a relapse and seek help immediately:      ¨ How you think, feel, and see things has changed  ¨ You start to behave differently, or others tell you they notice a change  ¨ You become more nervous and upset, but do not know why  ¨ You eat less or have trouble sleeping  ¨ You have little or no interest in friends or activities  What are the risks of schizoaffective disorder? Even with treatment, your symptoms may come back or not go away  If schizoaffective disorder is left untreated, your condition may get worse  It may affect the way you think of yourself and how you get along with others  Your condition may make it hard for you to do your normal activities  You may be at increased risk for diabetes and heart and lung disease  Your risk for alcohol or drug abuse increases  You may have thoughts of hurting or killing yourself or others  Where can I find support and more information? · American Psychiatric Association  [facility], [facility] , [address]ina P[address]  Phone: [phone]  Phone: [phone]  Web Address: [employer] [facility]  · [address] [place] [address], Public Information & Communication Branch  [address] [place] [address], [address], [address]  [place] [address]   Phone: [phone]  Phone: [phone]  Web Address: [employer]  When should I contact my healthcare provider? · You think you are having a relapse  · You are having side effects from your medicines, or they are not helping      · You are not sleeping well or are sleeping more than usual     · You cannot eat or are eating more than usual     · You have muscle spasms, stiffness, or trouble walking  · Your sad feelings or thoughts change the way you function during the day  · You have questions or concerns about your condition or care  When should I seek immediate care or call 911? · You feel like hurting or killing yourself or others  · You feel that your condition is getting worse  · You feel very upset, threaten someone, or feel violent  · You suddenly have changes in your vision  · You suddenly have chest pain, trouble breathing, or a fever  CARE AGREEMENT:   You have the right to help plan your care  Learn about your health condition and how it may be treated  Discuss treatment options with your caregivers to decide what care you want to receive  You always have the right to refuse treatment  The above information is an  only  It is not intended as medical advice for individual conditions or treatments  Talk to your doctor, nurse or pharmacist before following any medical regimen to see if it is safe and effective for you  © 2017 [facility] [facility] Information is for End User's use only and may not be sold, redistributed or otherwise used for commercial purposes  All illustrations and images included in CareNotes® are the copyrighted property of A D A M , Inc  or [employer]  Gabapentin (By mouth)   Gabapentin (gema-a-PEN-tin)  Treats seizures and pain caused by shingles  Brand Name(s): ACTIVE-PAC with Gabapentin, Convenience Madhu, Cyclo/Porfirio 10/300 Pack, FusePaq Fanatrex, Porfirio-V, Gralise, [address] Pack, Neurontin, SmartRx Porfirio Kit   There may be other brand names for this medicine  When This Medicine Should Not Be Used: This medicine is not right for everyone  Do not use it if you had an allergic reaction to gabapentin  How to Use This Medicine:   Capsule, Liquid, Tablet  · Take your medicine as directed   Your dose may need to be changed several times to find what works best for you  If you have epilepsy, do not allow more than 12 hours to pass between doses  · Capsule: Swallow the capsule whole with plenty of water  Do not open, crush, or chew it  · Gralise® tablet: Swallow the tablet whole   Do not crush, break, or chew it  · Neurontin® tablet: If you break a tablet into 2 pieces, use the second half as your next dose  If you don't use it within 28 days, throw it away  · Measure the oral liquid medicine with a marked measuring spoon, oral syringe, or medicine cup  · This medicine should come with a Medication Guide  Ask your pharmacist for a copy if you do not have one  · Missed dose: Take a dose as soon as you remember  If it is almost time for your next dose, wait until then and take a regular dose  Do not take extra medicine to make up for a missed dose  · Store the medicine in a closed container at room temperature, away from heat, moisture, and direct light  Store the Neurontin® oral liquid in the refrigerator  Do not freeze  Drugs and Foods to Avoid:   Ask your doctor or pharmacist before using any other medicine, including over-the-counter medicines, vitamins, and herbal products  · Some medicines can affect how gabapentin works  Tell your doctor if you also use any of the following:   ¨ Hydrocodone  ¨ Morphine  · If you take an antacid, wait at least 2 hours before you take gabapentin  · Tell your doctor if you use anything else that makes you sleepy  Some examples are allergy medicine, narcotic pain medicine, and alcohol  Warnings While Using This Medicine:   · Tell your doctor if you are pregnant or breastfeeding, or if you have kidney problems or are receiving dialysis  Tell your doctor if you have a history of depression or mental health problems  · This medicine may increase depression or thoughts of suicide  Tell your doctor right away if you start to feel more depressed or think about hurting yourself    · This medicine may cause a serious allergic reaction called multiorgan hypersensitivity, which can damage organs and be life-threatening  · Do not stop using this medicine suddenly  Your doctor will need to slowly decrease your dose before you stop it completely  If you take this medicine to prevent seizures, your seizures may return or occur more often if you stop this medicine suddenly  · This medicine may make you dizzy or drowsy  Do not drive or do anything else that could be dangerous until you know how this medicine affects you  · Tell any doctor or dentist who treats you that you are using this medicine  This medicine may affect certain medical test results  · Your doctor will check your progress and the effects of this medicine at regular visits  Keep all appointments  · Keep all medicine out of the reach of children  Never share your medicine with anyone    Possible Side Effects While Using This Medicine:   Call your doctor right away if you notice any of these side effects:  · Allergic reaction: Itching or hives, swelling in your face or hands, swelling or tingling in your mouth or throat, chest tightness, trouble breathing  · Behavior problems, aggression, restlessness, trouble concentrating, moodiness (especially in children)  · Blistering, peeling, red skin rash  · Change in how much or how often you urinate, bloody or cloudy urine,  · Chest pain, fast heartbeat, trouble breathing  · Dark urine or pale stools, nausea, vomiting, loss of appetite, stomach pain, yellow skin or eyes  · Fever, rash, swollen or tender glands in the neck, armpit, or groin  · Problems with coordination, shakiness, unsteadiness  · Rapid weight gain, swelling in your hands, ankles, or feet  · Unusual moods or behaviors, thoughts of hurting yourself, feeling depressed  If you notice these less serious side effects, talk with your doctor:   · Dizziness, drowsiness, sleepiness, tiredness  If you notice other side effects that you think are caused by this medicine, tell your doctor  Call your doctor for medical advice about side effects  You may report side effects to FDA at 1-011-WSI-5221  © 2017 [facility] Information is for End User's use only and may not be sold, redistributed or otherwise used for commercial purposes  The above information is an  only  It is not intended as medical advice for individual conditions or treatments  Talk to your doctor, nurse or pharmacist before following any medical regimen to see if it is safe and effective for you

## 2018-08-23 NOTE — DISCHARGE INSTR - OTHER ORDERS
You will discharge home to [address] [place] [address], [address] [facility], [address] [place] [address] Phone: [phone]  Crisis Plan:    Triggers you identified that lead to suicidal ideations, increased paranoia, and delusional thoughts include: alcohol abuse and chronic hallucinations  Coping skills you identified during your hospital stay include: listening to music and going for walks  After discharge, if you find your coping skills are not effective and you continue to feel distressed please contact your [facility] team     If that is not effective and you feel you are a danger to yourself or others please contact [address]: [phone], [address]:  [phone], Peer Support Talk Line (Seven days a week, 1:00 PM  9:00 PM) Call: [phone] or Text: [phone], Alcohol Anonymous: 860.827.7602, [place] [facility]: 926.566.9372, Jorge on [address] [place] [address] ([facility]) [employer]: [phone]/Email: www mariana  org, or Substance Abuse and Mental Health Services Administration([facility]) [employer], which is a confidential, free, 24-hour-a-day, 365-day-a-year, information service for individuals and family members facing mental health and/or substance use disorders  This service provides referrals to local treatment facilities, support groups, and community-based organizations  Callers can also order free publications and other information    Call 2-226.461.2218/XDIBN: www [facility] gov

## 2018-08-23 NOTE — PLAN OF CARE
Alteration in Thoughts and Perception     Recognize dysfunctional thoughts, communicate reality-based thoughts at the time of discharge Completed        Anxiety     Anxiety is at manageable level Completed        Depression     Treatment Goal: Demonstrate behavioral control of depressive symptoms, verbalize feelings of improved mood/affect, and adopt new coping skills prior to discharge Completed        Ineffective Coping     Demonstrates healthy coping skills Completed     Participates in unit activities Completed     Patient/Family participate in treatment and DC plans Completed     Patient/Family verbalizes awareness of resources Completed          Pt has shown progression to the point of completing the above listed goals   Pt is ready for D/C today as per treatment team  Pt is aware and happily anticipating such

## 2018-08-23 NOTE — NURSING NOTE
This writer reviewed AVS, prescribed medication, follow up care and med education with patient  Pt was attentive to review of such and denies questions or concerns at this time  Pt reports she will be picked up her room mate Paulett Lefort  Pt is happily looking forward to her discharge and denies concerns related to her current living arrangements
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
## 2018-09-04 ENCOUNTER — HOSPITAL ENCOUNTER (EMERGENCY)
Facility: HOSPITAL | Age: 48
Discharge: HOME | End: 2018-09-04
Attending: EMERGENCY MEDICINE
Payer: COMMERCIAL

## 2018-09-04 VITALS
SYSTOLIC BLOOD PRESSURE: 142 MMHG | WEIGHT: 116 LBS | HEART RATE: 96 BPM | DIASTOLIC BLOOD PRESSURE: 86 MMHG | BODY MASS INDEX: 18.64 KG/M2 | TEMPERATURE: 97.8 F | HEIGHT: 66 IN | OXYGEN SATURATION: 94 % | RESPIRATION RATE: 19 BRPM

## 2018-09-04 DIAGNOSIS — K52.9 GASTROENTERITIS: Primary | ICD-10-CM

## 2018-09-04 LAB
ALBUMIN SERPL BCP-MCNC: 4.1 G/DL (ref 3.5–5.7)
ALP SERPL-CCNC: 98 U/L (ref 40–150)
ALT SERPL W P-5'-P-CCNC: 80 U/L (ref 7–52)
ANION GAP SERPL CALCULATED.3IONS-SCNC: 14 MMOL/L (ref 4–13)
AST SERPL W P-5'-P-CCNC: 84 U/L (ref 13–39)
BASOPHILS # BLD AUTO: 0.1 THOUSANDS/ΜL (ref 0–0.1)
BASOPHILS NFR BLD AUTO: 0 % (ref 0–2)
BILIRUB SERPL-MCNC: 1.2 MG/DL (ref 0.2–1)
BUN SERPL-MCNC: 10 MG/DL (ref 7–25)
CALCIUM SERPL-MCNC: 9.5 MG/DL (ref 8.6–10.5)
CHLORIDE SERPL-SCNC: 87 MMOL/L (ref 98–107)
CO2 SERPL-SCNC: 28 MMOL/L (ref 21–31)
CREAT SERPL-MCNC: 0.93 MG/DL (ref 0.6–1.2)
EOSINOPHIL # BLD AUTO: 0.1 THOUSAND/ΜL (ref 0–0.61)
EOSINOPHIL NFR BLD AUTO: 0 % (ref 0–5)
ERYTHROCYTE [DISTWIDTH] IN BLOOD BY AUTOMATED COUNT: 13 % (ref 11.5–14.5)
GFR SERPL CREATININE-BSD FRML MDRD: 73 ML/MIN/1.73SQ M
GLUCOSE SERPL-MCNC: 111 MG/DL (ref 65–99)
HCT VFR BLD AUTO: 47.1 % (ref 34.8–46.1)
HGB BLD-MCNC: 16.4 G/DL (ref 12–16)
LIPASE SERPL-CCNC: 46 U/L (ref 11–82)
LYMPHOCYTES # BLD AUTO: 2.2 THOUSANDS/ΜL (ref 0.6–4.47)
LYMPHOCYTES NFR BLD AUTO: 15 % (ref 21–51)
MCH RBC QN AUTO: 31.6 PG (ref 26–34)
MCHC RBC AUTO-ENTMCNC: 34.8 G/DL (ref 31–37)
MCV RBC AUTO: 91 FL (ref 81–99)
MONOCYTES # BLD AUTO: 0.7 THOUSAND/ΜL (ref 0.17–1.22)
MONOCYTES NFR BLD AUTO: 5 % (ref 2–12)
NEUTROPHILS # BLD AUTO: 12 THOUSANDS/ΜL (ref 1.4–6.5)
NEUTS SEG NFR BLD AUTO: 80 % (ref 42–75)
NRBC BLD AUTO-RTO: 0 /100 WBCS
PLATELET # BLD AUTO: 201 THOUSANDS/UL (ref 149–390)
PMV BLD AUTO: 7.4 FL (ref 8.6–11.7)
POTASSIUM SERPL-SCNC: 3.1 MMOL/L (ref 3.5–5.5)
PROT SERPL-MCNC: 6.9 G/DL (ref 6.4–8.9)
RBC # BLD AUTO: 5.18 MILLION/UL (ref 3.9–5.2)
SODIUM SERPL-SCNC: 129 MMOL/L (ref 134–143)
WBC # BLD AUTO: 15 THOUSAND/UL (ref 4.8–10.8)

## 2018-09-04 PROCEDURE — 96361 HYDRATE IV INFUSION ADD-ON: CPT

## 2018-09-04 PROCEDURE — 96376 TX/PRO/DX INJ SAME DRUG ADON: CPT

## 2018-09-04 PROCEDURE — 96375 TX/PRO/DX INJ NEW DRUG ADDON: CPT

## 2018-09-04 PROCEDURE — 83690 ASSAY OF LIPASE: CPT | Performed by: EMERGENCY MEDICINE

## 2018-09-04 PROCEDURE — 80053 COMPREHEN METABOLIC PANEL: CPT | Performed by: EMERGENCY MEDICINE

## 2018-09-04 PROCEDURE — 96365 THER/PROPH/DIAG IV INF INIT: CPT

## 2018-09-04 PROCEDURE — 85025 COMPLETE CBC W/AUTO DIFF WBC: CPT | Performed by: EMERGENCY MEDICINE

## 2018-09-04 PROCEDURE — 36415 COLL VENOUS BLD VENIPUNCTURE: CPT

## 2018-09-04 PROCEDURE — 99283 EMERGENCY DEPT VISIT LOW MDM: CPT

## 2018-09-04 PROCEDURE — 96366 THER/PROPH/DIAG IV INF ADDON: CPT

## 2018-09-04 RX ORDER — SODIUM CHLORIDE 9 MG/ML
1000 INJECTION, SOLUTION INTRAVENOUS CONTINUOUS
Status: DISCONTINUED | OUTPATIENT
Start: 2018-09-04 | End: 2018-09-04 | Stop reason: SDUPTHER

## 2018-09-04 RX ORDER — ONDANSETRON 2 MG/ML
4 INJECTION INTRAMUSCULAR; INTRAVENOUS ONCE
Status: COMPLETED | OUTPATIENT
Start: 2018-09-04 | End: 2018-09-04

## 2018-09-04 RX ORDER — METOCLOPRAMIDE 10 MG/1
10 TABLET ORAL EVERY 6 HOURS
Qty: 10 TABLET | Refills: 0 | Status: SHIPPED | OUTPATIENT
Start: 2018-09-04 | End: 2018-09-22 | Stop reason: ALTCHOICE

## 2018-09-04 RX ORDER — POTASSIUM CHLORIDE 14.9 MG/ML
20 INJECTION INTRAVENOUS ONCE
Status: COMPLETED | OUTPATIENT
Start: 2018-09-04 | End: 2018-09-04

## 2018-09-04 RX ORDER — METOCLOPRAMIDE HYDROCHLORIDE 5 MG/ML
10 INJECTION INTRAMUSCULAR; INTRAVENOUS ONCE
Status: COMPLETED | OUTPATIENT
Start: 2018-09-04 | End: 2018-09-04

## 2018-09-04 RX ORDER — SODIUM CHLORIDE 9 MG/ML
100 INJECTION, SOLUTION INTRAVENOUS CONTINUOUS
Status: DISCONTINUED | OUTPATIENT
Start: 2018-09-04 | End: 2018-09-05 | Stop reason: HOSPADM

## 2018-09-04 RX ADMIN — METOCLOPRAMIDE 10 MG: 5 INJECTION, SOLUTION INTRAMUSCULAR; INTRAVENOUS at 21:31

## 2018-09-04 RX ADMIN — SODIUM CHLORIDE 1000 ML/HR: 0.9 INJECTION, SOLUTION INTRAVENOUS at 18:18

## 2018-09-04 RX ADMIN — SODIUM CHLORIDE 100 ML/HR: 0.9 INJECTION, SOLUTION INTRAVENOUS at 21:32

## 2018-09-04 RX ADMIN — ONDANSETRON 4 MG: 2 INJECTION INTRAMUSCULAR; INTRAVENOUS at 18:18

## 2018-09-04 RX ADMIN — ONDANSETRON 4 MG: 2 INJECTION INTRAMUSCULAR; INTRAVENOUS at 22:54

## 2018-09-04 RX ADMIN — POTASSIUM CHLORIDE 20 MEQ: 200 INJECTION, SOLUTION INTRAVENOUS at 20:22
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
## 2018-09-22 ENCOUNTER — HOSPITAL ENCOUNTER (EMERGENCY)
Facility: HOSPITAL | Age: 48
Discharge: HOME | End: 2018-09-23
Admitting: EMERGENCY MEDICINE
Payer: COMMERCIAL

## 2018-09-22 DIAGNOSIS — F25.9 SCHIZOAFFECTIVE DISORDER (HCC): ICD-10-CM

## 2018-09-22 DIAGNOSIS — F10.10 ALCOHOL ABUSE: ICD-10-CM

## 2018-09-22 DIAGNOSIS — F32.A DEPRESSION: Primary | ICD-10-CM

## 2018-09-22 LAB
ALBUMIN SERPL BCP-MCNC: 3.8 G/DL (ref 3.5–5.7)
ALP SERPL-CCNC: 64 U/L (ref 40–150)
ALT SERPL W P-5'-P-CCNC: 9 U/L (ref 7–52)
AMPHETAMINES SERPL QL SCN: NEGATIVE
ANION GAP SERPL CALCULATED.3IONS-SCNC: 7 MMOL/L (ref 4–13)
AST SERPL W P-5'-P-CCNC: 16 U/L (ref 13–39)
BARBITURATES UR QL: NEGATIVE
BASOPHILS # BLD AUTO: 0.1 THOUSANDS/ΜL (ref 0–0.1)
BASOPHILS NFR BLD AUTO: 1 % (ref 0–2)
BENZODIAZ UR QL: NEGATIVE
BILIRUB SERPL-MCNC: 0.2 MG/DL (ref 0.2–1)
BUN SERPL-MCNC: 4 MG/DL (ref 7–25)
CALCIUM SERPL-MCNC: 8.9 MG/DL (ref 8.6–10.5)
CHLORIDE SERPL-SCNC: 102 MMOL/L (ref 98–107)
CO2 SERPL-SCNC: 26 MMOL/L (ref 21–31)
COCAINE UR QL: NEGATIVE
CREAT SERPL-MCNC: 0.54 MG/DL (ref 0.6–1.2)
EOSINOPHIL # BLD AUTO: 0.2 THOUSAND/ΜL (ref 0–0.61)
EOSINOPHIL NFR BLD AUTO: 2 % (ref 0–5)
ERYTHROCYTE [DISTWIDTH] IN BLOOD BY AUTOMATED COUNT: 14.9 % (ref 11.5–14.5)
ETHANOL SERPL-MCNC: 258.5 MG/DL
EXT PREG TEST URINE: NORMAL
GFR SERPL CREATININE-BSD FRML MDRD: 113 ML/MIN/1.73SQ M
GLUCOSE SERPL-MCNC: 106 MG/DL (ref 65–99)
HCT VFR BLD AUTO: 37.5 % (ref 34.8–46.1)
HGB BLD-MCNC: 12.8 G/DL (ref 12–16)
LYMPHOCYTES # BLD AUTO: 3.3 THOUSANDS/ΜL (ref 0.6–4.47)
LYMPHOCYTES NFR BLD AUTO: 28 % (ref 21–51)
MCH RBC QN AUTO: 32.5 PG (ref 26–34)
MCHC RBC AUTO-ENTMCNC: 34.1 G/DL (ref 31–37)
MCV RBC AUTO: 95 FL (ref 81–99)
METHADONE UR QL: NEGATIVE
MONOCYTES # BLD AUTO: 1 THOUSAND/ΜL (ref 0.17–1.22)
MONOCYTES NFR BLD AUTO: 8 % (ref 2–12)
NEUTROPHILS # BLD AUTO: 7.2 THOUSANDS/ΜL (ref 1.4–6.5)
NEUTS SEG NFR BLD AUTO: 61 % (ref 42–75)
NRBC BLD AUTO-RTO: 0 /100 WBCS
OPIATES UR QL SCN: NEGATIVE
PCP UR QL: NEGATIVE
PLATELET # BLD AUTO: 317 THOUSANDS/UL (ref 149–390)
PMV BLD AUTO: 6.6 FL (ref 8.6–11.7)
POTASSIUM SERPL-SCNC: 3.4 MMOL/L (ref 3.5–5.5)
PROT SERPL-MCNC: 6.3 G/DL (ref 6.4–8.9)
RBC # BLD AUTO: 3.93 MILLION/UL (ref 3.9–5.2)
SODIUM SERPL-SCNC: 135 MMOL/L (ref 134–143)
THC UR QL: NEGATIVE
WBC # BLD AUTO: 11.8 THOUSAND/UL (ref 4.8–10.8)

## 2018-09-22 PROCEDURE — 36415 COLL VENOUS BLD VENIPUNCTURE: CPT | Performed by: PHYSICIAN ASSISTANT

## 2018-09-22 PROCEDURE — 80320 DRUG SCREEN QUANTALCOHOLS: CPT | Performed by: PHYSICIAN ASSISTANT

## 2018-09-22 PROCEDURE — 96372 THER/PROPH/DIAG INJ SC/IM: CPT

## 2018-09-22 PROCEDURE — 81025 URINE PREGNANCY TEST: CPT | Performed by: PHYSICIAN ASSISTANT

## 2018-09-22 PROCEDURE — 80053 COMPREHEN METABOLIC PANEL: CPT | Performed by: PHYSICIAN ASSISTANT

## 2018-09-22 PROCEDURE — 99283 EMERGENCY DEPT VISIT LOW MDM: CPT

## 2018-09-22 PROCEDURE — 80307 DRUG TEST PRSMV CHEM ANLYZR: CPT | Performed by: PHYSICIAN ASSISTANT

## 2018-09-22 PROCEDURE — 85025 COMPLETE CBC W/AUTO DIFF WBC: CPT | Performed by: PHYSICIAN ASSISTANT

## 2018-09-22 RX ORDER — LORAZEPAM 2 MG/ML
2 INJECTION INTRAMUSCULAR ONCE
Status: COMPLETED | OUTPATIENT
Start: 2018-09-22 | End: 2018-09-22

## 2018-09-22 RX ORDER — DIPHENHYDRAMINE HYDROCHLORIDE 50 MG/ML
50 INJECTION INTRAMUSCULAR; INTRAVENOUS ONCE
Status: COMPLETED | OUTPATIENT
Start: 2018-09-22 | End: 2018-09-22

## 2018-09-22 RX ORDER — HALOPERIDOL 5 MG/ML
5 INJECTION INTRAMUSCULAR ONCE
Status: COMPLETED | OUTPATIENT
Start: 2018-09-22 | End: 2018-09-22

## 2018-09-22 RX ADMIN — HALOPERIDOL LACTATE 5 MG: 5 INJECTION INTRAMUSCULAR at 19:23

## 2018-09-22 RX ADMIN — DIPHENHYDRAMINE HYDROCHLORIDE 50 MG: 50 INJECTION INTRAMUSCULAR; INTRAVENOUS at 19:23

## 2018-09-22 RX ADMIN — LORAZEPAM 2 MG: 2 INJECTION INTRAMUSCULAR; INTRAVENOUS at 19:22
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
## 2018-09-22 NOTE — ED NOTES
Patient began acting out cursing and yelling at staff and hallucinating  Patient moved into bed 7 placed under constant video monitoring visible from the nurses station        Augie Gonzalez RN  09/22/18 1927

## 2018-09-23 VITALS
HEIGHT: 64 IN | WEIGHT: 120 LBS | TEMPERATURE: 97.9 F | HEART RATE: 81 BPM | DIASTOLIC BLOOD PRESSURE: 72 MMHG | SYSTOLIC BLOOD PRESSURE: 112 MMHG | BODY MASS INDEX: 20.49 KG/M2 | OXYGEN SATURATION: 98 % | RESPIRATION RATE: 18 BRPM

## 2018-09-23 LAB — ETHANOL SERPL-MCNC: 34.4 MG/DL

## 2018-09-23 PROCEDURE — 36415 COLL VENOUS BLD VENIPUNCTURE: CPT

## 2018-09-23 PROCEDURE — 80320 DRUG SCREEN QUANTALCOHOLS: CPT

## 2018-09-23 RX ORDER — HYDROXYZINE HYDROCHLORIDE 25 MG/1
25 TABLET, FILM COATED ORAL ONCE
Status: COMPLETED | OUTPATIENT
Start: 2018-09-23 | End: 2018-09-23

## 2018-09-23 RX ORDER — RISPERIDONE 0.5 MG/1
0.5 TABLET, FILM COATED ORAL ONCE
Status: COMPLETED | OUTPATIENT
Start: 2018-09-23 | End: 2018-09-23

## 2018-09-23 RX ORDER — LORAZEPAM 0.5 MG/1
0.5 TABLET ORAL ONCE
Status: COMPLETED | OUTPATIENT
Start: 2018-09-23 | End: 2018-09-23

## 2018-09-23 RX ORDER — BENZTROPINE MESYLATE 1 MG/1
1 TABLET ORAL ONCE
Status: COMPLETED | OUTPATIENT
Start: 2018-09-23 | End: 2018-09-23

## 2018-09-23 RX ADMIN — RISPERIDONE 0.5 MG: 0.5 TABLET ORAL at 10:45

## 2018-09-23 RX ADMIN — GABAPENTIN 400 MG: 300 CAPSULE ORAL at 10:03

## 2018-09-23 RX ADMIN — HYDROXYZINE HYDROCHLORIDE 25 MG: 25 TABLET ORAL at 10:03

## 2018-09-23 RX ADMIN — LORAZEPAM 0.5 MG: 0.5 TABLET ORAL at 10:03

## 2018-09-23 RX ADMIN — BENZTROPINE MESYLATE 1 MG: 1 TABLET ORAL at 10:45

## 2018-09-23 NOTE — ED NOTES
Crisis worker met with patient to complete assessment  Patient denied any thoughts of suicidal and homicide  Patient reported increased depression and anxiety  Patient expressed auditory hallucinations of rambling voices  Patient reported no concerns with sleep and eating  Patient reported taking all medication as prescribe  Patient denied making an suicidal attempted last Wednesday  Patient does admit she has been drinking daily  Patient expressed she does not like her living environment  Patient requesting to go to Rehab and does not want mental health treatment  Patient awaiting ACT team member to come in to discuss treatment plan  Patient is not currently willing to sign 201

## 2018-09-23 NOTE — ED NOTES
Crisis worker spoke with patient's ACT team member Tom Damon will be in to see patient this morning

## 2018-09-23 NOTE — ED NOTES
Crisis worker and patient's ACT member met with patient  Spoke about plan of care  ACT member Maverick Al) agreed to take patient home and their D&A worker will work on patient getting into rehab  Patient still denied any thoughts of harming self and/or others

## 2018-09-23 NOTE — DISCHARGE INSTRUCTIONS
Abuse of Alcohol   Patient is medically cleared for admission to rehabilitation center  WHAT YOU SHOULD KNOW:   Alcohol abuse is when you drink large amounts of alcohol often to change your mood or behavior  CARE AGREEMENT:   You have the right to help plan your care  Learn about your health condition and how it may be treated  Discuss treatment options with your caregivers to decide what care you want to receive  You always have the right to refuse treatment  RISKS:   Medicines to treat alcohol abuse may cause vomiting, stress, anxiety, headaches, or dizziness  Alcohol abuse puts you at risk for disease and injury  Alcohol can damage your brain, heart, kidneys, lungs, and liver  The risk of stroke is greater if you have 5 or more drinks each day  You may act out violently when you abuse alcohol  You may harm yourself and others  Risky sexual behavior could lead to sexually transmitted infections  If you are pregnant and drink alcohol, you and your baby are at risk for serious health problems  Alcohol abuse may put you in a coma and may be life-threatening  WHILE YOU ARE HERE:   Informed consent  is a legal document that explains the tests, treatments, or procedures that you may need  Informed consent means you understand what will be done and can make decisions about what you want  You give your permission when you sign the consent form  You can have someone sign this form for you if you are not able to sign it  You have the right to understand your medical care in words you know  Before you sign the consent form, understand the risks and benefits of what will be done  Make sure all your questions are answered  Psychiatric assessment:  Caregivers will ask if you have a history of psychological trauma, such as physical, sexual, or mental abuse  They will ask if you were given the care that you needed   Caregivers will ask you if you have been a victim of a crime or natural disaster, or if you have a serious injury or disease  They will ask you if you have seen other people being harmed, such as in combat  You will be asked if you drink alcohol or use drugs at present or in the past  Caregivers will ask you if you want to hurt or kill yourself or others  How you answer these questions can help caregivers decide on treatment  To help during treatment, caregivers will ask you about such things as how you feel about it and your hobbies and goals  Caregivers will also ask you about the people in your life who support you  Pulse oximeter: A pulse oximeter is a device that measures the amount of oxygen in your blood  A cord with a clip or sticky strip is placed on your finger, ear, or toe  The other end of the cord is hooked to a machine  Never turn the pulse oximeter or alarm off  An alarm will sound if your oxygen level is low or cannot be read  Vital signs:  Caregivers will check your blood pressure, heart rate, breathing rate, and temperature  They will also ask about your pain  These vital signs give caregivers information about your current health  Intake and Output:  Healthcare providers will keep track of the amount of liquid you are getting  They may also need to know how much you are urinating  Ask your healthcare provider how much liquid you should have each day  You may need to increase or decrease the amount of liquid you have each day  Ask healthcare providers if they need to measure or collect your urine before you dispose of it  An IV  is a small tube placed in your vein that is used to give you medicine or liquids  Medicines:   · Sedative: This medicine is given to help you stay calm and relaxed  · Anticonvulsant medicine: This medicine is given to control seizures  Take this medicine exactly as directed  · Antinausea medicine: This medicine may be given to calm your stomach and prevent vomiting  · Glucose: This medicine may be given to increase the amount of sugar in your blood  · Vitamin supplement:  Alcohol can make it hard for your body to absorb enough vitamin B1  You may be given vitamin B1 if you have low levels  It is also given to prevent alcohol related brain damage  You may also need other vitamin supplements  Tests:   · Blood and urine tests:  Samples of your blood or urine are tested for alcohol  Tests can also show signs of liver, kidney, or heart damage caused by alcohol  You may need to have these tests more than once  · Neurologic exam:  This is also called neuro signs, neuro checks, or neuro status  A neurologic exam can show caregivers how well your brain works after an injury or illness  Caregivers will check how your pupils (black dots in the center of each eye) react to light  They may check your memory and how easily you wake up  Your hand grasp and balance may also be tested  · EKG: This test records the electrical activity of your heart  It will be used to check for damage or problems caused by alcohol  · Chest x-ray: This is a picture of your lungs and heart  Healthcare providers may use the x-ray to look for heart damage, injuries, or signs of infection, such as pneumonia  · CT scan: This test is also called a CAT scan  An x-ray machine uses a computer to take pictures of your brain  The pictures may show damage caused by alcohol abuse  You may be given a dye before the pictures are taken to help healthcare providers see the pictures better  Tell the healthcare provider if you have ever had an allergic reaction to contrast dye  Treatment:   · Brief intervention therapy:  A healthcare provider meets with you to discuss ways to control your risky behaviors, such as drinking and driving  This therapy also helps you set goals to decrease the amount of alcohol you drink  · Breathing support:      ¨ You may need extra oxygen  if your blood oxygen level is lower than it should be   You may get oxygen through a mask placed over your nose and mouth or through small tubes placed in your nostrils  Ask your healthcare provider before you take off the mask or oxygen tubing  ¨ A ventilator  is a machine that gives you oxygen and breathes for you when you cannot breathe well on your own  An endotracheal (ET) tube is put into your mouth or nose and attached to the ventilator  You may need a trach if an ET tube cannot be placed  A trach is a tube put through an incision and into your windpipe  © 2014 [address] is for End User's use only and may not be sold, redistributed or otherwise used for commercial purposes  All illustrations and images included in CareNotes® are the copyrighted property of [employer] M [employer]  or Jamari Mota  The above information is an  only  It is not intended as medical advice for individual conditions or treatments  Talk to your doctor, nurse or pharmacist before following any medical regimen to see if it is safe and effective for you  Depression, Ambulatory Care   GENERAL INFORMATION:   Depression  is a medical condition that causes feelings of sadness or hopelessness that do not go away  Depression may cause you to lose interest in things you used to enjoy  These feelings may interfere with your daily life  Common symptoms include the following:   · Appetite changes, or weight gain or loss    · Trouble going to sleep or staying asleep, or sleeping too much    · Fatigue or lack of energy    · Feeling restless, irritable, or withdrawn    · Feeling worthless, hopeless, discouraged, or very guilty    · Trouble concentrating, remembering things, doing daily tasks, or making decisions    · Thoughts about hurting or killing yourself  Seek immediate care for the following symptoms:   · You think about harming yourself or someone else  Treatment for depression  may include medicine to improve or balance your mood  Therapy may also be used to treat your depression   A therapist will help you learn to cope with your thoughts and feelings  Therapy can be done alone or in a group  It may also be done with family members or a significant other  Manage depression:   · Get regular physical activity  Try to exercise for 30 minutes, 3 to 5 days a week  Work with your healthcare provider to develop an exercise plan that you enjoy  · Get enough sleep  Create a routine to help you relax before bed  Try to go to bed and wake up at the same time every day  Sleep is important for emotional health  · Eat a variety of healthy foods  Healthy foods include fruits, vegetables, whole-grain breads, low-fat dairy products, beans, lean meats, and fish  A healthy meal plan is low in fat, salt, and added sugar  · Avoid or limit alcohol  Ask your healthcare provider how much alcohol is safe for you to drink  A drink of alcohol is 12 ounces of beer, 5 ounces of wine, or 1½ ounces of liquor  Follow up with your healthcare provider as directed: You will need to return so your healthcare provider can monitor your progress  Write down your questions so you remember to ask them during your visits  CARE AGREEMENT:   You have the right to help plan your care  Learn about your health condition and how it may be treated  Discuss treatment options with your caregivers to decide what care you want to receive  You always have the right to refuse treatment  The above information is an  only  It is not intended as medical advice for individual conditions or treatments  Talk to your doctor, nurse or pharmacist before following any medical regimen to see if it is safe and effective for you  © 2014 [address] is for End User's use only and may not be sold, redistributed or otherwise used for commercial purposes  All illustrations and images included in CareNotes® are the copyrighted property of A D A M , Inc  or [facility]      Schizoaffective Disorder WHAT YOU NEED TO KNOW:   What is schizoaffective disorder? Schizoaffective disorder is a long-term mental illness that may change how you think, feel, and act around others  Schizoaffective disorder involves both psychosis (loss of reality), along with depression or sudheer  You may not know what is real and what is not real    What causes schizoaffective disorder? The cause of schizoaffective disorder in not known  It is thought there may be an imbalance in chemicals that help control movement, thought, and mood  What increases my risk of schizoaffective disorder? · You are female  · You were born during the winter months  · You had psychological stress from abuse, disaster, or major life change  · Your brain did not develop normally before you were born  · You have a family member with schizophrenia, a mood disorder, or schizoaffective disorder  What are the signs and symptoms of schizoaffective disorder? · Delusions: These are false ideas  You may believe that someone is spying on you, or that you are someone famous  · Hallucinations: You see, feel, taste, hear, or smell something that is not real     · Disordered thinking and speech:  When you talk, you move from one subject to another in a way that does not make sense  You may make up your own words or sounds  · Negative symptoms: These include lack of expression, eye contact, and words  You may not be able to start and continue a planned activity  You may have little interest in work or social activities  · Depression:  You are depressed most of the day or nearly every day  You may lose or gain weight, or have trouble sleeping or concentrating  There may be feeling of hopelessness and suicidal thoughts  · Manic behavior: These are periods of increased self-esteem and energy with little to no sleep  You may talk more than usual  You may have racing thoughts or be easily distracted   You may have more interest in social activities  How is schizoaffective disorder diagnosed? Your healthcare provider will perform a psychiatric assessment  He will ask if you have a history of psychological trauma, such as physical, sexual, or mental abuse  He will ask if you were given the care that you needed when you needed it  He will ask if you have a history of alcohol or drug abuse  Your healthcare provider will ask you if you want to hurt or kill yourself or others  He will also ask about your hobbies and goals, the people in your life who support you, and how you feel about treatment  The answers to these questions help healthcare providers plan your treatment  Which medicines are used to treat schizoaffective disorder? · Antipsychotics: These help decrease psychotic symptoms or severe agitation  You may need antiparkinson medicine to control muscle stiffness, twitches, and restlessness caused by antipsychotic medicines  · Antianxiety medicine: This medicine may be given to decrease anxiety and help you feel calm and relaxed  · Antidepressants: These help decrease or stop the symptoms of depression, anxiety, and behavior problems  · Mood stabilizers: These control mood swings  · Anticonvulsants: These control seizures, decrease violent behavior, and control mood swings  · Blood pressure medicines: These may be used to help decrease motor tics (uncontrolled movements)  They may also help you feel calmer, more focused, and less irritable  · Anticholinergics: This decreases the side effects of other medicines  Which therapies are used to treat schizoaffective disorder? · Assertive community treatment:  A team of healthcare providers and support groups in your community help you with your therapy  · Cognitive behavior therapy: This therapy helps you to change certain behaviors  It will help you handle symptoms such as hallucinations and delusions   It can help you learn how to get along with others, and help you cope with and handle your disease  · Compliance therapy: This is a therapy to help find ways to make it easier for you to take your medicines and get treatment  · Counseling: This helps you learn self-care, how to decrease your symptoms, and prevent them from coming back  · Family intervention: This program lets your family be part of your therapy  · Skills training: This training helps you learn how to get along with other people  You will also learn how to do everyday activities and skills you need to live on your own  · Supported employment: This is a form of therapy where you are placed into a job that fits your skills  It will help give you independence and self-confidence  · Electroconvulsive therapy: This is a type of shock therapy, also called ECT  This therapy passes a small amount of electricity through the brain  How can I help manage schizoaffective disorder? The following may help you feel better or prevent symptoms of schizoaffective disorder from coming back:  · Find support for yourself and your family:  Talk with others to help you cope with your illness better  This may also help to improve how you relate to others  · Keep all medical appointments: This will help manage your disease and the side-effects from medicines you may be taking  · Take your medicines as directed:  Put your medicines in a pillbox placed in an area you can easily see  Use a watch with an alarm to help you remember when it is time to take your medicine  Tell your healthcare provider if you know or think you might be pregnant because your medication could affect the baby  Do not stop taking your medicines without your healthcare provider's okay  A sudden stop can cause serious medical problems  · Watch for early signs of a relapse and seek help immediately:      ¨ How you think, feel, and see things has changed      ¨ You start to behave differently, or others tell you they notice a change  ¨ You become more nervous and upset, but do not know why  ¨ You eat less or have trouble sleeping  ¨ You have little or no interest in friends or activities  What are the risks of schizoaffective disorder? Even with treatment, your symptoms may come back or not go away  If schizoaffective disorder is left untreated, your condition may get worse  It may affect the way you think of yourself and how you get along with others  Your condition may make it hard for you to do your normal activities  You may be at increased risk for diabetes and heart and lung disease  Your risk for alcohol or drug abuse increases  You may have thoughts of hurting or killing yourself or others  Where can I find support and more information? · [employer]  [address] [facility], [address] , [address]  Phone: [phone]  Phone: [phone]  Web Address: [employer]  · [address] [place] [address], Public Information & Communication Branch  [address] [place] [address], [address] [facility], [address]  [place] [id number]   Phone: [phone]  Phone: [phone]  Web Address: [facility]  When should I contact my healthcare provider? · You think you are having a relapse  · You are having side effects from your medicines, or they are not helping  · You are not sleeping well or are sleeping more than usual     · You cannot eat or are eating more than usual     · You have muscle spasms, stiffness, or trouble walking  · Your sad feelings or thoughts change the way you function during the day  · You have questions or concerns about your condition or care  When should I seek immediate care or call [facility]? · You feel like hurting or killing yourself or others  · You feel that your condition is getting worse  · You feel very upset, threaten someone, or feel violent  · You suddenly have changes in your vision      · You suddenly have chest pain, trouble breathing, or a fever  CARE AGREEMENT:   You have the right to help plan your care  Learn about your health condition and how it may be treated  Discuss treatment options with your caregivers to decide what care you want to receive  You always have the right to refuse treatment  The above information is an  only  It is not intended as medical advice for individual conditions or treatments  Talk to your doctor, nurse or pharmacist before following any medical regimen to see if it is safe and effective for you  © 2017 [facility] Information is for End User's use only and may not be sold, redistributed or otherwise used for commercial purposes  All illustrations and images included in CareNotes® are the copyrighted property of A D A M , Inc  or Jamari Mota

## 2018-10-14 ENCOUNTER — APPOINTMENT (EMERGENCY)
Dept: CT IMAGING | Facility: HOSPITAL | Age: 48
End: 2018-10-14
Payer: COMMERCIAL

## 2018-10-14 ENCOUNTER — HOSPITAL ENCOUNTER (INPATIENT)
Facility: HOSPITAL | Age: 48
LOS: 1 days | Discharge: HOME | DRG: 861 | End: 2018-10-15
Attending: FAMILY MEDICINE | Admitting: FAMILY MEDICINE
Payer: COMMERCIAL

## 2018-10-14 ENCOUNTER — APPOINTMENT (EMERGENCY)
Dept: RADIOLOGY | Facility: HOSPITAL | Age: 48
End: 2018-10-14
Payer: COMMERCIAL

## 2018-10-14 ENCOUNTER — HOSPITAL ENCOUNTER (EMERGENCY)
Facility: HOSPITAL | Age: 48
End: 2018-10-14
Attending: EMERGENCY MEDICINE
Payer: COMMERCIAL

## 2018-10-14 VITALS
SYSTOLIC BLOOD PRESSURE: 127 MMHG | WEIGHT: 117.28 LBS | DIASTOLIC BLOOD PRESSURE: 89 MMHG | OXYGEN SATURATION: 98 % | RESPIRATION RATE: 14 BRPM | TEMPERATURE: 98.1 F | BODY MASS INDEX: 20.13 KG/M2 | HEART RATE: 63 BPM

## 2018-10-14 DIAGNOSIS — R29.898 LOWER EXTREMITY WEAKNESS: Primary | ICD-10-CM

## 2018-10-14 DIAGNOSIS — F25.0 SCHIZOAFFECTIVE DISORDER, BIPOLAR TYPE (HCC): ICD-10-CM

## 2018-10-14 DIAGNOSIS — R45.89 SUICIDAL BEHAVIOR WITHOUT ATTEMPTED SELF-INJURY: ICD-10-CM

## 2018-10-14 PROBLEM — D72.829 LEUKOCYTOSIS: Status: ACTIVE | Noted: 2018-10-14

## 2018-10-14 LAB
ALBUMIN SERPL BCP-MCNC: 3 G/DL (ref 3.5–5)
ALP SERPL-CCNC: 96 U/L (ref 46–116)
ALT SERPL W P-5'-P-CCNC: 24 U/L (ref 12–78)
ANION GAP SERPL CALCULATED.3IONS-SCNC: 7 MMOL/L (ref 4–13)
AST SERPL W P-5'-P-CCNC: 32 U/L (ref 5–45)
BACTERIA UR QL AUTO: ABNORMAL /HPF
BASOPHILS # BLD AUTO: 0.05 THOUSANDS/ΜL (ref 0–0.1)
BASOPHILS NFR BLD AUTO: 0 % (ref 0–1)
BILIRUB SERPL-MCNC: 0.3 MG/DL (ref 0.2–1)
BILIRUB UR QL STRIP: NEGATIVE
BUN SERPL-MCNC: 4 MG/DL (ref 5–25)
CALCIUM SERPL-MCNC: 8.7 MG/DL (ref 8.3–10.1)
CHLORIDE SERPL-SCNC: 101 MMOL/L (ref 100–108)
CK SERPL-CCNC: 56 U/L (ref 26–192)
CLARITY UR: CLEAR
CO2 SERPL-SCNC: 27 MMOL/L (ref 21–32)
COLOR UR: YELLOW
CREAT SERPL-MCNC: 0.72 MG/DL (ref 0.6–1.3)
EOSINOPHIL # BLD AUTO: 0.12 THOUSAND/ΜL (ref 0–0.61)
EOSINOPHIL NFR BLD AUTO: 1 % (ref 0–6)
ERYTHROCYTE [DISTWIDTH] IN BLOOD BY AUTOMATED COUNT: 13.5 % (ref 11.6–15.1)
GFR SERPL CREATININE-BSD FRML MDRD: 100 ML/MIN/1.73SQ M
GLUCOSE SERPL-MCNC: 102 MG/DL (ref 65–140)
GLUCOSE UR STRIP-MCNC: NEGATIVE MG/DL
HCT VFR BLD AUTO: 42.3 % (ref 34.8–46.1)
HGB BLD-MCNC: 14.1 G/DL (ref 11.5–15.4)
HGB UR QL STRIP.AUTO: ABNORMAL
IMM GRANULOCYTES # BLD AUTO: 0.04 THOUSAND/UL (ref 0–0.2)
IMM GRANULOCYTES NFR BLD AUTO: 0 % (ref 0–2)
KETONES UR STRIP-MCNC: NEGATIVE MG/DL
LEUKOCYTE ESTERASE UR QL STRIP: NEGATIVE
LYMPHOCYTES # BLD AUTO: 1.86 THOUSANDS/ΜL (ref 0.6–4.47)
LYMPHOCYTES NFR BLD AUTO: 14 % (ref 14–44)
MCH RBC QN AUTO: 31.8 PG (ref 26.8–34.3)
MCHC RBC AUTO-ENTMCNC: 33.3 G/DL (ref 31.4–37.4)
MCV RBC AUTO: 95 FL (ref 82–98)
MONOCYTES # BLD AUTO: 0.61 THOUSAND/ΜL (ref 0.17–1.22)
MONOCYTES NFR BLD AUTO: 5 % (ref 4–12)
NEUTROPHILS # BLD AUTO: 10.44 THOUSANDS/ΜL (ref 1.85–7.62)
NEUTS SEG NFR BLD AUTO: 80 % (ref 43–75)
NITRITE UR QL STRIP: NEGATIVE
NON-SQ EPI CELLS URNS QL MICRO: ABNORMAL /HPF
NRBC BLD AUTO-RTO: 0 /100 WBCS
PH UR STRIP.AUTO: 7 [PH] (ref 4.5–8)
PLATELET # BLD AUTO: 227 THOUSANDS/UL (ref 149–390)
PMV BLD AUTO: 8.8 FL (ref 8.9–12.7)
POTASSIUM SERPL-SCNC: 3.6 MMOL/L (ref 3.5–5.3)
PROT SERPL-MCNC: 6.3 G/DL (ref 6.4–8.2)
PROT UR STRIP-MCNC: NEGATIVE MG/DL
RBC # BLD AUTO: 4.44 MILLION/UL (ref 3.81–5.12)
RBC #/AREA URNS AUTO: ABNORMAL /HPF
SODIUM SERPL-SCNC: 135 MMOL/L (ref 136–145)
SP GR UR STRIP.AUTO: <=1.005 (ref 1–1.03)
TROPONIN I SERPL-MCNC: <0.02 NG/ML
UROBILINOGEN UR QL STRIP.AUTO: 0.2 E.U./DL
WBC # BLD AUTO: 13.12 THOUSAND/UL (ref 4.31–10.16)
WBC #/AREA URNS AUTO: ABNORMAL /HPF

## 2018-10-14 PROCEDURE — 36415 COLL VENOUS BLD VENIPUNCTURE: CPT | Performed by: EMERGENCY MEDICINE

## 2018-10-14 PROCEDURE — 85025 COMPLETE CBC W/AUTO DIFF WBC: CPT | Performed by: EMERGENCY MEDICINE

## 2018-10-14 PROCEDURE — 82550 ASSAY OF CK (CPK): CPT | Performed by: EMERGENCY MEDICINE

## 2018-10-14 PROCEDURE — 81001 URINALYSIS AUTO W/SCOPE: CPT | Performed by: EMERGENCY MEDICINE

## 2018-10-14 PROCEDURE — 99233 SBSQ HOSP IP/OBS HIGH 50: CPT | Performed by: FAMILY MEDICINE

## 2018-10-14 PROCEDURE — 80053 COMPREHEN METABOLIC PANEL: CPT | Performed by: EMERGENCY MEDICINE

## 2018-10-14 PROCEDURE — 93005 ELECTROCARDIOGRAM TRACING: CPT

## 2018-10-14 PROCEDURE — 71046 X-RAY EXAM CHEST 2 VIEWS: CPT

## 2018-10-14 PROCEDURE — 72131 CT LUMBAR SPINE W/O DYE: CPT

## 2018-10-14 PROCEDURE — 84484 ASSAY OF TROPONIN QUANT: CPT | Performed by: EMERGENCY MEDICINE

## 2018-10-14 PROCEDURE — 99285 EMERGENCY DEPT VISIT HI MDM: CPT

## 2018-10-14 RX ORDER — HYDROXYZINE HYDROCHLORIDE 25 MG/1
25 TABLET, FILM COATED ORAL 2 TIMES DAILY
Status: DISCONTINUED | OUTPATIENT
Start: 2018-10-14 | End: 2018-10-15 | Stop reason: HOSPADM

## 2018-10-14 RX ORDER — RISPERIDONE 1 MG/1
1 TABLET, FILM COATED ORAL
COMMUNITY
End: 2018-10-30 | Stop reason: HOSPADM

## 2018-10-14 RX ORDER — SENNOSIDES 8.6 MG
2 TABLET ORAL DAILY
Status: DISCONTINUED | OUTPATIENT
Start: 2018-10-15 | End: 2018-10-15 | Stop reason: HOSPADM

## 2018-10-14 RX ORDER — GABAPENTIN 400 MG/1
400 CAPSULE ORAL 4 TIMES DAILY
Status: DISCONTINUED | OUTPATIENT
Start: 2018-10-14 | End: 2018-10-15 | Stop reason: HOSPADM

## 2018-10-14 RX ORDER — ONDANSETRON 2 MG/ML
4 INJECTION INTRAMUSCULAR; INTRAVENOUS EVERY 6 HOURS PRN
Status: DISCONTINUED | OUTPATIENT
Start: 2018-10-14 | End: 2018-10-15 | Stop reason: HOSPADM

## 2018-10-14 RX ORDER — LORAZEPAM 0.5 MG/1
0.5 TABLET ORAL 2 TIMES DAILY
Status: DISCONTINUED | OUTPATIENT
Start: 2018-10-14 | End: 2018-10-15 | Stop reason: HOSPADM

## 2018-10-14 RX ORDER — RISPERIDONE 1 MG/1
3 TABLET, FILM COATED ORAL
Status: DISCONTINUED | OUTPATIENT
Start: 2018-10-14 | End: 2018-10-15 | Stop reason: HOSPADM

## 2018-10-14 RX ORDER — DOCUSATE SODIUM 100 MG/1
100 CAPSULE, LIQUID FILLED ORAL 2 TIMES DAILY
Status: DISCONTINUED | OUTPATIENT
Start: 2018-10-14 | End: 2018-10-15 | Stop reason: HOSPADM

## 2018-10-14 RX ORDER — LORAZEPAM 2 MG/ML
1 INJECTION INTRAMUSCULAR EVERY 4 HOURS PRN
Status: DISCONTINUED | OUTPATIENT
Start: 2018-10-14 | End: 2018-10-15 | Stop reason: HOSPADM

## 2018-10-14 RX ORDER — HEPARIN SODIUM 5000 [USP'U]/ML
5000 INJECTION, SOLUTION INTRAVENOUS; SUBCUTANEOUS EVERY 8 HOURS SCHEDULED
Status: DISCONTINUED | OUTPATIENT
Start: 2018-10-14 | End: 2018-10-15 | Stop reason: HOSPADM

## 2018-10-14 RX ORDER — RISPERIDONE 1 MG/1
1 TABLET, FILM COATED ORAL
Status: DISCONTINUED | OUTPATIENT
Start: 2018-10-15 | End: 2018-10-15 | Stop reason: HOSPADM

## 2018-10-14 RX ORDER — ACETAMINOPHEN 325 MG/1
650 TABLET ORAL EVERY 6 HOURS PRN
Status: DISCONTINUED | OUTPATIENT
Start: 2018-10-14 | End: 2018-10-15 | Stop reason: HOSPADM

## 2018-10-14 RX ORDER — NICOTINE 21 MG/24HR
1 PATCH, TRANSDERMAL 24 HOURS TRANSDERMAL DAILY
Status: DISCONTINUED | OUTPATIENT
Start: 2018-10-15 | End: 2018-10-15 | Stop reason: HOSPADM

## 2018-10-14 RX ORDER — GABAPENTIN 400 MG/1
400 CAPSULE ORAL 4 TIMES DAILY
Status: DISCONTINUED | OUTPATIENT
Start: 2018-10-14 | End: 2018-10-14

## 2018-10-14 RX ORDER — BENZTROPINE MESYLATE 1 MG/1
1 TABLET ORAL
Status: DISCONTINUED | OUTPATIENT
Start: 2018-10-14 | End: 2018-10-15 | Stop reason: HOSPADM

## 2018-10-14 RX ORDER — MAGNESIUM HYDROXIDE/ALUMINUM HYDROXICE/SIMETHICONE 120; 1200; 1200 MG/30ML; MG/30ML; MG/30ML
30 SUSPENSION ORAL EVERY 6 HOURS PRN
Status: DISCONTINUED | OUTPATIENT
Start: 2018-10-14 | End: 2018-10-15 | Stop reason: HOSPADM

## 2018-10-14 RX ORDER — LIDOCAINE HYDROCHLORIDE 10 MG/ML
10 INJECTION, SOLUTION EPIDURAL; INFILTRATION; INTRACAUDAL; PERINEURAL ONCE
Status: DISCONTINUED | OUTPATIENT
Start: 2018-10-14 | End: 2018-10-14

## 2018-10-14 RX ORDER — OLANZAPINE 10 MG/1
5 INJECTION, POWDER, LYOPHILIZED, FOR SOLUTION INTRAMUSCULAR ONCE
Status: DISCONTINUED | OUTPATIENT
Start: 2018-10-14 | End: 2018-10-14

## 2018-10-14 RX ADMIN — RISPERIDONE 3 MG: 1 TABLET ORAL at 21:39

## 2018-10-14 RX ADMIN — HYDROXYZINE HYDROCHLORIDE 25 MG: 25 TABLET ORAL at 20:06

## 2018-10-14 RX ADMIN — BENZTROPINE MESYLATE 1 MG: 1 TABLET ORAL at 21:40

## 2018-10-14 RX ADMIN — LORAZEPAM 0.5 MG: 0.5 TABLET ORAL at 20:07

## 2018-10-14 RX ADMIN — GABAPENTIN 400 MG: 400 CAPSULE ORAL at 20:10

## 2018-10-14 RX ADMIN — HEPARIN SODIUM 5000 UNITS: 5000 INJECTION, SOLUTION INTRAVENOUS; SUBCUTANEOUS at 21:40

## 2018-10-14 NOTE — ASSESSMENT & PLAN NOTE
· Patient came to the hospital with the inability to move except her head  · Patient reported that her bilateral upper extremity strength is improving and she is able to move upper extremities-but cannot raise them about shoulder level  · Reported that she can barely move the lower extremity bilaterally-appears to have poor effort/possible psychogenic  · Consult Neurology  · CT lumbar spine was negative for any acute pathology  · Her symptoms are suggestive of upper extremity weakness too    We will obtain a cervical spine and also lumbar spine MRI  · Patient recently had gastroenteritis

## 2018-10-14 NOTE — EMTALA/ACUTE CARE TRANSFER
[address] [address]  [id number] [place] [address]  Dept: [phone]      EMTALA TRANSFER CONSENT    NAME Chester Nava                                         1970                              MRN [id number]    I have been informed of my rights regarding examination, treatment, and transfer   by Dr Justino Dalal MD    Benefits: Specialized equipment and/or services available at the receiving facility (Include comment)________________________ Gurrola Kira)    Risks: Potential for delay in receiving treatment, Potential deterioration of medical condition, Loss of IV, Possible worsening of condition or death during transfer, Increased discomfort during transfer      Transfer Request   I acknowledge that my medical condition has been evaluated and explained to me by the emergency department physician or other qualified medical person and/or my attending physician who has recommended and offered to me further medical examination and treatment  I understand the Hospital's obligation with respect to the treatment and stabilization of my emergency medical condition  I nevertheless request to be transferred  I release the Hospital, the doctor, and any other persons caring for me from all responsibility or liability for any injury or ill effects that may result from my transfer and agree to accept all responsibility for the consequences of my choice to transfer, rather than receive stabilizing treatment at the Hospital  I understand that because the transfer is my request, my insurance may not provide reimbursement for the services  The Hospital will assist and direct me and my family in how to make arrangements for transfer, but the hospital is not liable for any fees charged by the transport service    In spite of this understanding, I refuse to consent to further medical examination and treatment which has been offered to me, and request transfer to  [facility]  Name, [address] [place]  I authorize the performance of emergency medical procedures and treatments upon me in both transit and upon arrival at the receiving facility  Additionally, I authorize the release of any and all medical records to the receiving facility and request they be transported with me, if possible  I authorize the performance of emergency medical procedures and treatments upon me in both transit and upon arrival at the receiving facility  Additionally, I authorize the release of any and all medical records to the receiving facility and request they be transported with me, if possible  I understand that the safest mode of transportation during a medical emergency is an ambulance and that the Hospital advocates the use of this mode of transport  Risks of traveling to the receiving facility by car, including absence of medical control, life sustaining equipment, such as oxygen, and medical personnel has been explained to me and I fully understand them  (OSEI CORRECT BOX BELOW)  [  ]  I consent to the stated transfer and to be transported by ambulance/helicopter  [  ]  I consent to the stated transfer, but refuse transportation by ambulance and accept full responsibility for my transportation by car  I understand the risks of non-ambulance transfers and I exonerate the Hospital and its staff from any deterioration in my condition that results from this refusal     X___________________________________________    DATE  10/14/18  TIME________  Signature of patient or legally responsible individual signing on patient behalf           RELATIONSHIP TO PATIENT_________________________          Provider Certification    NAME Kurt Shah                                        [facility] 1970                              MRN [id number]    A medical screening exam was performed on the above named patient    Based on the examination:    Condition Necessitating Transfer The encounter diagnosis was Lower extremity weakness  Patient Condition: The patient has been stabilized such that within reasonable medical probability, no material deterioration of the patient condition or the condition of the unborn child(yeni) is likely to result from the transfer    Reason for Transfer: Level of Care needed not available at this facility (neuro care)    Transfer Requirements: [address]   · Space available and qualified personnel available for treatment as acknowledged by    · Agreed to accept transfer and to provide appropriate medical treatment as acknowledged by       Mariza Gautam  · Appropriate medical records of the examination and treatment of the patient are provided at the time of transfer   [address] _______  · Transfer will be performed by qualified personnel from    and appropriate transfer equipment as required, including the use of necessary and appropriate life support measures  Provider Certification: I have examined the patient and explained the following risks and benefits of being transferred/refusing transfer to the patient/family:  General risk, such as traffic hazards, adverse weather conditions, rough terrain or turbulence, possible failure of equipment (including vehicle or aircraft), or consequences of actions of persons outside the control of the transport personnel      Based on these reasonable risks and benefits to the patient and/or the unborn child(yeni), and based upon the information available at the time of the patients examination, I certify that the medical benefits reasonably to be expected from the provision of appropriate medical treatments at another medical facility outweigh the increasing risks, if any, to the individuals medical condition, and in the case of labor to the unborn child, from effecting the transfer      X____________________________________________ DATE 10/14/18        TIME_______      ORIGINAL - SEND TO MEDICAL RECORDS   COPY - SEND WITH PATIENT DURING TRANSFER
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
## 2018-10-14 NOTE — ASSESSMENT & PLAN NOTE
· Continue Ativan  · Patient with history of alcohol abuse/alcohol intoxication  · Initiate CIWA protocol  · P r n   Ativan

## 2018-10-15 VITALS
TEMPERATURE: 98 F | HEART RATE: 76 BPM | OXYGEN SATURATION: 96 % | HEIGHT: 60 IN | DIASTOLIC BLOOD PRESSURE: 69 MMHG | SYSTOLIC BLOOD PRESSURE: 99 MMHG | RESPIRATION RATE: 18 BRPM | BODY MASS INDEX: 24.02 KG/M2 | WEIGHT: 122.36 LBS

## 2018-10-15 LAB
ALBUMIN SERPL BCP-MCNC: 2.8 G/DL (ref 3.5–5)
ALP SERPL-CCNC: 91 U/L (ref 46–116)
ALT SERPL W P-5'-P-CCNC: 29 U/L (ref 12–78)
ANION GAP SERPL CALCULATED.3IONS-SCNC: 6 MMOL/L (ref 4–13)
AST SERPL W P-5'-P-CCNC: 47 U/L (ref 5–45)
ATRIAL RATE: 64 BPM
BASOPHILS # BLD AUTO: 0.04 THOUSANDS/ΜL (ref 0–0.1)
BASOPHILS NFR BLD AUTO: 1 % (ref 0–1)
BILIRUB SERPL-MCNC: 0.44 MG/DL (ref 0.2–1)
BUN SERPL-MCNC: 7 MG/DL (ref 5–25)
CALCIUM SERPL-MCNC: 8.8 MG/DL (ref 8.3–10.1)
CHLORIDE SERPL-SCNC: 102 MMOL/L (ref 100–108)
CO2 SERPL-SCNC: 28 MMOL/L (ref 21–32)
CREAT SERPL-MCNC: 0.71 MG/DL (ref 0.6–1.3)
EOSINOPHIL # BLD AUTO: 0.32 THOUSAND/ΜL (ref 0–0.61)
EOSINOPHIL NFR BLD AUTO: 4 % (ref 0–6)
ERYTHROCYTE [DISTWIDTH] IN BLOOD BY AUTOMATED COUNT: 13.6 % (ref 11.6–15.1)
GFR SERPL CREATININE-BSD FRML MDRD: 102 ML/MIN/1.73SQ M
GLUCOSE SERPL-MCNC: 96 MG/DL (ref 65–140)
HCT VFR BLD AUTO: 44.3 % (ref 34.8–46.1)
HGB BLD-MCNC: 14.4 G/DL (ref 11.5–15.4)
IMM GRANULOCYTES # BLD AUTO: 0.03 THOUSAND/UL (ref 0–0.2)
IMM GRANULOCYTES NFR BLD AUTO: 0 % (ref 0–2)
LYMPHOCYTES # BLD AUTO: 2.59 THOUSANDS/ΜL (ref 0.6–4.47)
LYMPHOCYTES NFR BLD AUTO: 35 % (ref 14–44)
MAGNESIUM SERPL-MCNC: 2.1 MG/DL (ref 1.6–2.6)
MCH RBC QN AUTO: 31.9 PG (ref 26.8–34.3)
MCHC RBC AUTO-ENTMCNC: 32.5 G/DL (ref 31.4–37.4)
MCV RBC AUTO: 98 FL (ref 82–98)
MONOCYTES # BLD AUTO: 0.4 THOUSAND/ΜL (ref 0.17–1.22)
MONOCYTES NFR BLD AUTO: 5 % (ref 4–12)
NEUTROPHILS # BLD AUTO: 4 THOUSANDS/ΜL (ref 1.85–7.62)
NEUTS SEG NFR BLD AUTO: 55 % (ref 43–75)
NRBC BLD AUTO-RTO: 0 /100 WBCS
P AXIS: 50 DEGREES
PLATELET # BLD AUTO: 229 THOUSANDS/UL (ref 149–390)
PMV BLD AUTO: 10.2 FL (ref 8.9–12.7)
POTASSIUM SERPL-SCNC: 4.2 MMOL/L (ref 3.5–5.3)
PR INTERVAL: 128 MS
PROT SERPL-MCNC: 6.3 G/DL (ref 6.4–8.2)
QRS AXIS: 12 DEGREES
QRSD INTERVAL: 76 MS
QT INTERVAL: 472 MS
QTC INTERVAL: 486 MS
RBC # BLD AUTO: 4.52 MILLION/UL (ref 3.81–5.12)
SODIUM SERPL-SCNC: 136 MMOL/L (ref 136–145)
T WAVE AXIS: 43 DEGREES
VENTRICULAR RATE: 64 BPM
WBC # BLD AUTO: 7.38 THOUSAND/UL (ref 4.31–10.16)

## 2018-10-15 PROCEDURE — 85025 COMPLETE CBC W/AUTO DIFF WBC: CPT | Performed by: FAMILY MEDICINE

## 2018-10-15 PROCEDURE — G8978 MOBILITY CURRENT STATUS: HCPCS

## 2018-10-15 PROCEDURE — 83735 ASSAY OF MAGNESIUM: CPT | Performed by: FAMILY MEDICINE

## 2018-10-15 PROCEDURE — 97166 OT EVAL MOD COMPLEX 45 MIN: CPT

## 2018-10-15 PROCEDURE — 84165 PROTEIN E-PHORESIS SERUM: CPT | Performed by: PHYSICIAN ASSISTANT

## 2018-10-15 PROCEDURE — 84165 PROTEIN E-PHORESIS SERUM: CPT | Performed by: PATHOLOGY

## 2018-10-15 PROCEDURE — G8987 SELF CARE CURRENT STATUS: HCPCS

## 2018-10-15 PROCEDURE — 99239 HOSP IP/OBS DSCHRG MGMT >30: CPT | Performed by: PHYSICIAN ASSISTANT

## 2018-10-15 PROCEDURE — 99254 IP/OBS CNSLTJ NEW/EST MOD 60: CPT | Performed by: PSYCHIATRY & NEUROLOGY

## 2018-10-15 PROCEDURE — 82525 ASSAY OF COPPER: CPT | Performed by: PHYSICIAN ASSISTANT

## 2018-10-15 PROCEDURE — 97163 PT EVAL HIGH COMPLEX 45 MIN: CPT

## 2018-10-15 PROCEDURE — 93010 ELECTROCARDIOGRAM REPORT: CPT | Performed by: INTERNAL MEDICINE

## 2018-10-15 PROCEDURE — 80053 COMPREHEN METABOLIC PANEL: CPT | Performed by: FAMILY MEDICINE

## 2018-10-15 PROCEDURE — 82175 ASSAY OF ARSENIC: CPT | Performed by: PHYSICIAN ASSISTANT

## 2018-10-15 PROCEDURE — G8979 MOBILITY GOAL STATUS: HCPCS

## 2018-10-15 PROCEDURE — G8988 SELF CARE GOAL STATUS: HCPCS

## 2018-10-15 PROCEDURE — 99255 IP/OBS CONSLTJ NEW/EST HI 80: CPT | Performed by: PSYCHIATRY & NEUROLOGY

## 2018-10-15 PROCEDURE — 82390 ASSAY OF CERULOPLASMIN: CPT | Performed by: PHYSICIAN ASSISTANT

## 2018-10-15 PROCEDURE — 83825 ASSAY OF MERCURY: CPT | Performed by: PHYSICIAN ASSISTANT

## 2018-10-15 PROCEDURE — 83655 ASSAY OF LEAD: CPT | Performed by: PHYSICIAN ASSISTANT

## 2018-10-15 RX ADMIN — HEPARIN SODIUM 5000 UNITS: 5000 INJECTION, SOLUTION INTRAVENOUS; SUBCUTANEOUS at 13:04

## 2018-10-15 RX ADMIN — NICOTINE 1 PATCH: 21 PATCH, EXTENDED RELEASE TRANSDERMAL at 08:41

## 2018-10-15 RX ADMIN — HEPARIN SODIUM 5000 UNITS: 5000 INJECTION, SOLUTION INTRAVENOUS; SUBCUTANEOUS at 05:28

## 2018-10-15 RX ADMIN — HYDROXYZINE HYDROCHLORIDE 25 MG: 25 TABLET ORAL at 08:41

## 2018-10-15 RX ADMIN — GABAPENTIN 400 MG: 400 CAPSULE ORAL at 11:33

## 2018-10-15 RX ADMIN — RISPERIDONE 1 MG: 1 TABLET ORAL at 08:41

## 2018-10-15 RX ADMIN — LORAZEPAM 0.5 MG: 0.5 TABLET ORAL at 08:40

## 2018-10-15 RX ADMIN — GABAPENTIN 400 MG: 400 CAPSULE ORAL at 08:41

## 2018-10-15 NOTE — ASSESSMENT & PLAN NOTE
· Patient came to the hospital with the inability to move except her head  · Symptoms currently resolved and suspect psychogenic in nature  · Neurology consult appreciated - they are going to drawn some routine labs and follow up in the movement disorders clinic, but again low suspicion for an organic cause  · CT lumbar spine was negative for any acute pathology
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
## 2018-10-15 NOTE — ASSESSMENT & PLAN NOTE
· Continue with outpatient medications  · Patient has Behavioral Health support through [facility] consult appreciated  Patient does not need inpatient psychiatric treatment at this time
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
## 2018-10-15 NOTE — SOCIAL WORK
CM met with Pt, per her request to discuss her need for assistance with d/c transportation home today  CM discussed the Ly[employer] program, had the Pt sign the waiver made arrangements with [facility] for Pt's transport to home upon her d/c today  CM made Pt's RN aware of the above

## 2018-10-15 NOTE — MEDICAL STUDENT
[facility] Progress Note  Patient: Hedy Begum 53 y o  female   MRN: [id number]  PCP: Haydee Green DO  Unit/Bed#: [facility] 934-01 Encounter: [id number]  Date Of Visit: 10/15/18    Assessment:    Principal Problem:    Lower extremity weakness  Active Problems:    Schizoaffective disorder (HCC)    Anxiety    Leukocytosis      Plan:    · Lower extremity weakness  · Patient came to hospital with inability to move any extremities except head  · Patient reported that this has happened to her 3 times before, but only for a few hours, this is longest stretch it has happened to her  · Patient is able to walk again, found to be up walking to bathroom in room this morning, unassisted  · Cervical and lumbar MRI canceled  · Neuro checks 5/5 UE b/l strengths, 5/5 LE b/l strengths   · CT lumbar spine negative for any acute pathology  · likely psychogenic      · Mild Leukocytosis   · 7 38 today, continue to trend  · UA negative     · Anxiety  · Continue scheduled and PRN ativan  · Patient with history of alcohol abuse/intoxication     · Schizoaffective disorder  · Patient with reported suicidal ideations, does not have a plan  · Placed on 1:1  · Consult to psych  · When probed about feeling safe, she does not feel safe in general, still no plan  · Continue with outpatient medications    VTE Pharmacologic Prophylaxis: heparin   Pharmacologic: Heparin  Mechanical VTE Prophylaxis in Place: Yes    Patient Centered Rounds: I have performed bedside rounds with nursing staff today  Discussions with Specialists or Other Care Team Provider: none    Education and Discussions with Family / Patient: patient    Time Spent for Care: 45 minutes  More than 50% of total time spent on counseling and coordination of care as described above      Current Length of Stay: 1 day(s)    Current Patient Status: Inpatient   Certification Statement: The patient will continue to require additional inpatient hospital stay due to psych consult    Discharge Plan / Estimated Discharge Date: 24-48 hours    Code Status: Level 3 - DNAR and DNI      Subjective:   Patient seen resting in bed with 1:1 present  Patient was seen up ambulating to bathroom this morning by her nurse with a steady gait  Patient states this has happened to her 3 times before and that this is longest period of time it has happened to her  While doing a ROS with the patient she answered yes to nearly every question  She has SOB, chest pain, dizziness, weakness, fatigue, headaches, suicidal ideations without a plan, sore throat when swallowing and coughing (no coughing present while in the room for approx 20 minutes)  However, she does not have claudication or leg pain with ambulation, but she does feel weak in her legs, but did not require furniture walking, a cane, or walker to ambulate this morning  Awaiting psychiatry input  D/t patient ambulating this morning, MRI has been canceled  Objective:     Vitals:   Temp (24hrs), Av 2 °F (36 8 °C), Min:98 °F (36 7 °C), Max:98 3 °F (36 8 °C)    Temp:  [98 °F (36 7 °C)-98 3 °F (36 8 °C)] 98 °F (36 7 °C)  HR:  [60-77] 76  Resp:  [14-18] 18  BP: ()/(66-89) 99/69  SpO2:  [92 %-98 %] 96 %  Body mass index is 23 9 kg/m²  Input and Output Summary (last 24 hours): Intake/Output Summary (Last 24 hours) at 10/15/18 1018  Last data filed at 10/14/18 1745   Gross per 24 hour   Intake                0 ml   Output              250 ml   Net             -250 ml       Physical Exam:     Physical Exam   Constitutional: She is oriented to person, place, and time  Vital signs are normal  She appears well-developed and well-nourished  She is cooperative  HENT:   Head: Normocephalic and atraumatic  Eyes: Pupils are equal, round, and reactive to light  EOM are normal  Right eye exhibits no discharge  Left eye exhibits no discharge  Neck: Normal range of motion  Neck supple  No JVD present  No tracheal deviation present   No thyromegaly present  Cardiovascular: Normal rate, regular rhythm, normal heart sounds and intact distal pulses  Exam reveals no gallop and no friction rub  No murmur heard  Pulmonary/Chest: Effort normal and breath sounds normal  No stridor  No respiratory distress  She has no wheezes  She has no rales  She exhibits no tenderness  Abdominal: Soft  Bowel sounds are normal  She exhibits no distension and no mass  There is tenderness (across entire abdomen)  There is no rebound and no guarding  Musculoskeletal: Normal range of motion  She exhibits no edema or deformity  Lymphadenopathy:        Head (right side): No occipital adenopathy present  Head (left side): No occipital adenopathy present  She has no cervical adenopathy  Right: No supraclavicular adenopathy present  Left: No supraclavicular adenopathy present  Neurological: She is alert and oriented to person, place, and time  Coordination normal    Skin: Skin is warm and dry  Psychiatric:   Flat, suicidal ideation, no plan         Additional Data:     Labs:      Results from last 7 days  Lab Units 10/15/18  0529   WBC Thousand/uL 7 38   HEMOGLOBIN g/dL 14 4   HEMATOCRIT % 44 3   PLATELETS Thousands/uL 229   NEUTROS PCT % 55   LYMPHS PCT % 35   MONOS PCT % 5   EOS PCT % 4       Results from last 7 days  Lab Units 10/15/18  0529   SODIUM mmol/L 136   POTASSIUM mmol/L 4 2   CHLORIDE mmol/L 102   CO2 mmol/L 28   BUN mg/dL 7   CREATININE mg/dL 0 71   CALCIUM mg/dL 8 8   ALK PHOS U/L 91   ALT U/L 29   AST U/L 47*           * I Have Reviewed All Lab Data Listed Above  * Additional Pertinent Lab Tests Reviewed:  All Labs Within Last 24 Hours Reviewed    Imaging:    Imaging Reports Reviewed Today Include: CT spine  Imaging Personally Reviewed by Myself Includes:  none    Recent Cultures (last 7 days):           Last 24 Hours Medication List:     Current Facility-Administered Medications:  acetaminophen 650 mg Oral Q6H PRN Deisi Marcus Orlando Singh MD   aluminum-magnesium hydroxide-simethicone 30 mL Oral Q6H PRN Elliot Thibodeaux MD   benztropine 1 mg Oral HS Elliot Thibodeaux MD   docusate sodium 100 mg Oral BID Elliot Thibodeaux MD   gabapentin 400 mg Oral 4x Daily Elliot Thibodeaux MD   heparin (porcine) 5,000 Units Subcutaneous [facility] Elliot Thibodeaux MD   hydrOXYzine HCL 25 mg Oral BID Elliot Thibodeaux MD   LORazepam 1 mg Intravenous Q4H PRN Elliot Thibodeaux MD   LORazepam 0 5 mg Oral BID Elliot Thibodeaux MD   nicotine 1 patch Transdermal Daily Elliot Thibodeaux MD   ondansetron 4 mg Intravenous Q6H PRN Elliot Thibodeaux MD   risperiDONE 1 mg Oral After Breakfast Elliot Thibodeaux MD   risperiDONE 3 mg Oral HS Elliot Thibodeaux MD   senna 2 tablet Oral Daily Elliot Thibodeaux MD        Today, Patient Was Seen By: Troy Pena RN    ** Please Note: This note has been constructed using a voice recognition system   **

## 2018-10-15 NOTE — PLAN OF CARE
Problem: PHYSICAL THERAPY ADULT  Goal: Performs mobility at highest level of function for planned discharge setting  See evaluation for individualized goals  Treatment/Interventions: Functional transfer training, Elevations, Therapeutic exercise, Endurance training, Patient/family training, Equipment eval/education, Bed mobility, Gait training          See flowsheet documentation for full assessment, interventions and recommendations  Prognosis: Good  Problem List: Decreased strength, Decreased endurance, Impaired balance, Decreased mobility, Pain, Decreased safety awareness  Assessment: Pt is 53 y o  female seen for PT evaluation s/p admit to [facility] on 10/14/2018 w/ Lower extremity weakness, back pain, inability to move any extremities except head  PT consulted to assess pt's functional mobility and d/c needs  Order placed for PT eval and tx, w/ up w/ A order  Comorbidities affecting pt's physical performance at time of assessment include: schizoaffective disorder, PTSD, HLD, lumbar DDD, anxiety, h/o suicide attempt, h/o drug/alc use  PTA, pt was independent w/ all functional mobility w/ no AD  Personal factors affecting pt at time of IE include: inability to navigate level surfaces w/o external assistance and limited home support  Please find objective findings from PT assessment regarding body systems outlined above with impairments and limitations including impaired balance, decreased endurance, gait deviations, pain, decreased activity tolerance, decreased functional mobility tolerance and fall risk  The following objective measures performed on IE also reveal limitations: Barthel Index: 75/100  Pt's clinical presentation is currently unstable/unpredictable seen in pt's presentation of increased pain with ambulation, decline from functional baseline,   Pt to benefit from continued PT tx to address deficits as defined above and maximize level of functional independent mobility and consistency  From PT/mobility standpoint, recommendation at time of d/c would be likely home w/ no needs vs home PT pending progress  Recommendation: Other (Comment) (no needs vs home PT)          See flowsheet documentation for full assessment
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
## 2018-10-15 NOTE — PLAN OF CARE
DISCHARGE PLANNING     Discharge to home or other facility with appropriate resources Progressing        INFECTION - ADULT     Absence or prevention of progression during hospitalization Progressing        Knowledge Deficit     Patient/family/caregiver demonstrates understanding of disease process, treatment plan, medications, and discharge instructions Progressing        MUSCULOSKELETAL - ADULT     Maintain or return mobility to safest level of function Progressing     Maintain proper alignment of affected body part Progressing        PAIN - ADULT     Verbalizes/displays adequate comfort level or baseline comfort level Progressing        SAFETY ADULT     Patient will remain free of falls Progressing     Maintain or return to baseline ADL function Progressing     Maintain or return mobility status to optimal level Progressing

## 2018-10-15 NOTE — PROGRESS NOTES
Nursing reported that patient is 7 pitting suicidal ideations  She do not have a plan  She is not planning to act on her suicidal ideation either    Will start the patient on one-to-one and consult Psychiatry

## 2018-10-15 NOTE — PROGRESS NOTES
Rounded with SLIM  MRI will be cancelled, as pt is OOB walking without assistance  Psych consulted as well

## 2018-10-15 NOTE — DISCHARGE SUMMARY
Discharge- Waylon Jacobson 1970, 53 y o  female MRN: [id number]    [facility]counter: [id number]    Primary Care Provider: Tessie Emerson DO   Date and time admitted to hospital: 10/14/2018  4:59 PM        * Lower extremity weakness   Assessment & Plan    · Patient came to the hospital with the inability to move except her head  · Symptoms currently resolved and suspect psychogenic in nature  · Neurology consult appreciated - they are going to drawn some routine labs and follow up in the movement disorders clinic, but again low suspicion for an organic cause  · CT lumbar spine was negative for any acute pathology  Leukocytosis   Assessment & Plan    · Mild leukocytosis  · Trend WBC count  · No signs or symptoms of active infection     Schizoaffective disorder [facility])   Assessment & Plan    · Continue with outpatient medications  · Patient has Behavioral Health support through [facility] consult appreciated  Patient does not need inpatient psychiatric treatment at this time  Anxiety   Assessment & Plan    · Continue Ativan  · Patient with history of alcohol abuse/alcohol intoxication  · P r n  Ativan         Discharging Physician / Practitioner: Alanna Klein PA-C  PCP: Tessie Emerson DO  Admission Date:   Admission Orders     Ordered        10/14/18 1833  Inpatient Admission  Once             Discharge Date: 10/15/18    Resolved Problems  Date Reviewed: 10/15/2018    None          Consultations During Hospital Stay:  · Dr Niko Christian  · Neurology    Procedures Performed:     · CT lumbar spine - Normal computed tomography of the lumbar spine    · CXR - No acute cardiopulmonary disease    Significant Findings / Test Results:     · none    Incidental Findings:   · none     Test Results Pending at Discharge (will require follow up):   · Heavy metals  · SPEP  · Copper level  · Ceruloplaasmin  · Above ordered by Neurology and can be followed up as outpatient     Outpatient Tests Requested:  · none    Complications:  none    Reason for Admission: weakness    Hospital Course:     Agnieszka Murrell is a 53 y o  female patient who originally presented to the hospital on 10/14/2018 due to weakness  Patient originally originally presented to the [address] [place] [address] Emergency Department with a 2 day history of lower extremity weakness  She was sent to [facility] for further evaluation  Over the course of her stay, patient's symptoms resolved completely and suspect were psychogenic in nature  Neurology saw the patient in consultation  They ordered blood workup as listed above  They will follow up with the patient in the movement disorders Clinic  They felt that patient had a low likelihood of an and organic origin of her symptoms  Patient also expressed some suicidal ideations during her hospital stay  Behavioral Health was consulted  Behavioral Health the not feel that the patient required inpatient psychiatric admission  Patient will be discharged back to her home in stable condition  She has an extensive outpatient treatment team through the   [facility]  and this will be reinstated prior to discharge  Please see above list of diagnoses and related plan for additional information  Condition at Discharge: good     Discharge Day Visit / Exam:     Subjective:  Offers no complaints  Vitals: Blood Pressure: 99/69 (10/15/18 0746)  Pulse: 76 (10/15/18 0746)  Temperature: 98 °F (36 7 °C) (10/15/18 0746)  Temp Source: Oral (10/15/18 0746)  Respirations: 18 (10/15/18 0746)  Height: 5' (152 4 cm) (10/14/18 1706)  Weight - Scale: 55 5 kg (122 lb 5 7 oz) (10/14/18 1706)  SpO2: 96 % (10/15/18 0900)  Exam:   Physical Exam   Constitutional: She is oriented to person, place, and time  She appears well-developed and well-nourished  No distress  HENT:   Head: Normocephalic and atraumatic  Cardiovascular: Normal rate and regular rhythm  Exam reveals no friction rub      No murmur heard   Pulmonary/Chest: Effort normal and breath sounds normal  No respiratory distress  She has no wheezes  Abdominal: Soft  Bowel sounds are normal  She exhibits no distension  There is no tenderness  There is no rebound and no guarding  Musculoskeletal: She exhibits no edema  Neurological: She is alert and oriented to person, place, and time  No cranial nerve deficit  Skin: Skin is warm and dry  No rash noted  Psychiatric: She has a normal mood and affect  Nursing note and vitals reviewed  Discussion with Family: declined    Discharge instructions/Information to patient and family:   See after visit summary for information provided to patient and family  Provisions for Follow-Up Care:  See after visit summary for information related to follow-up care and any pertinent home health orders  Disposition:     Home    For Discharges to [facility] SNF:   · Not Applicable to this Patient - Not Applicable to this Patient    Planned Readmission: none     Discharge Statement:  I spent 40 minutes discharging the patient  This time was spent on the day of discharge  I had direct contact with the patient on the day of discharge  Greater than 50% of the total time was spent examining patient, answering all patient questions, arranging and discussing plan of care with patient as well as directly providing post-discharge instructions  Additional time then spent on discharge activities  Discharge Medications:  See after visit summary for reconciled discharge medications provided to patient and family        ** Please Note: This note has been constructed using a voice recognition system **
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
## 2018-10-16 ENCOUNTER — TRANSITIONAL CARE MANAGEMENT (OUTPATIENT)
Dept: FAMILY MEDICINE CLINIC | Facility: CLINIC | Age: 48
End: 2018-10-16

## 2018-10-16 LAB
ALBUMIN SERPL ELPH-MCNC: 3.6 G/DL (ref 3.5–5)
ALBUMIN SERPL ELPH-MCNC: 57.1 % (ref 52–65)
ALPHA1 GLOB SERPL ELPH-MCNC: 0.37 G/DL (ref 0.1–0.4)
ALPHA1 GLOB SERPL ELPH-MCNC: 5.8 % (ref 2.5–5)
ALPHA2 GLOB SERPL ELPH-MCNC: 0.87 G/DL (ref 0.4–1.2)
ALPHA2 GLOB SERPL ELPH-MCNC: 13.8 % (ref 7–13)
BETA GLOB ABNORMAL SERPL ELPH-MCNC: 0.4 G/DL (ref 0.4–0.8)
BETA1 GLOB SERPL ELPH-MCNC: 6.4 % (ref 5–13)
BETA2 GLOB SERPL ELPH-MCNC: 5.9 % (ref 2–8)
BETA2+GAMMA GLOB SERPL ELPH-MCNC: 0.37 G/DL (ref 0.2–0.5)
CERULOPLASMIN SERPL-MCNC: 27.1 MG/DL (ref 19–39)
GAMMA GLOB ABNORMAL SERPL ELPH-MCNC: 0.69 G/DL (ref 0.5–1.6)
GAMMA GLOB SERPL ELPH-MCNC: 11 % (ref 12–22)
IGG/ALB SER: 1.33 {RATIO} (ref 1.1–1.8)
PROT PATTERN SERPL ELPH-IMP: ABNORMAL
PROT SERPL-MCNC: 6.3 G/DL (ref 6.4–8.2)

## 2018-10-17 LAB — COPPER SERPL-MCNC: 123 UG/DL (ref 72–166)

## 2018-10-18 LAB
ARSENIC BLD-MCNC: 3 UG/L (ref 2–23)
LEAD BLD-MCNC: 3 UG/DL (ref 0–4)
MERCURY BLD-MCNC: NORMAL UG/L (ref 0–14.9)
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
## 2018-10-23 ENCOUNTER — HOSPITAL ENCOUNTER (INPATIENT)
Facility: HOSPITAL | Age: 48
LOS: 6 days | Discharge: HOME | DRG: 750 | End: 2018-10-30
Attending: EMERGENCY MEDICINE | Admitting: PSYCHIATRY & NEUROLOGY
Payer: COMMERCIAL

## 2018-10-23 DIAGNOSIS — J06.9 URI (UPPER RESPIRATORY INFECTION): ICD-10-CM

## 2018-10-23 DIAGNOSIS — Z00.00 PHYSICAL EXAM: ICD-10-CM

## 2018-10-23 DIAGNOSIS — F20.0 PARANOID SCHIZOPHRENIA (HCC): ICD-10-CM

## 2018-10-23 DIAGNOSIS — F25.0 SCHIZOAFFECTIVE DISORDER, BIPOLAR TYPE (HCC): Primary | Chronic | ICD-10-CM

## 2018-10-23 DIAGNOSIS — H92.09 EAR ACHE: ICD-10-CM

## 2018-10-23 LAB
AMPHETAMINES SERPL QL SCN: NEGATIVE
BARBITURATES UR QL: NEGATIVE
BENZODIAZ UR QL: NEGATIVE
COCAINE UR QL: NEGATIVE
ETHANOL SERPL-MCNC: 178.4 MG/DL
ETHANOL SERPL-MCNC: 51.1 MG/DL
METHADONE UR QL: NEGATIVE
OPIATES UR QL SCN: NEGATIVE
PCP UR QL: NEGATIVE
THC UR QL: NEGATIVE

## 2018-10-23 PROCEDURE — 36415 COLL VENOUS BLD VENIPUNCTURE: CPT | Performed by: EMERGENCY MEDICINE

## 2018-10-23 PROCEDURE — 80307 DRUG TEST PRSMV CHEM ANLYZR: CPT | Performed by: EMERGENCY MEDICINE

## 2018-10-23 PROCEDURE — 80320 DRUG SCREEN QUANTALCOHOLS: CPT | Performed by: EMERGENCY MEDICINE

## 2018-10-23 PROCEDURE — 99285 EMERGENCY DEPT VISIT HI MDM: CPT

## 2018-10-23 RX ORDER — LORAZEPAM 0.5 MG/1
0.5 TABLET ORAL ONCE
Status: COMPLETED | OUTPATIENT
Start: 2018-10-23 | End: 2018-10-23

## 2018-10-23 RX ORDER — LORAZEPAM 1 MG/1
1 TABLET ORAL ONCE
Status: COMPLETED | OUTPATIENT
Start: 2018-10-23 | End: 2018-10-23

## 2018-10-23 RX ORDER — RISPERIDONE 2 MG/1
2 TABLET, FILM COATED ORAL
Status: COMPLETED | OUTPATIENT
Start: 2018-10-23 | End: 2018-10-25

## 2018-10-23 RX ORDER — BENZTROPINE MESYLATE 1 MG/1
1 TABLET ORAL ONCE
Status: COMPLETED | OUTPATIENT
Start: 2018-10-23 | End: 2018-10-23

## 2018-10-23 RX ORDER — HYDROXYZINE HYDROCHLORIDE 25 MG/1
25 TABLET, FILM COATED ORAL ONCE
Status: COMPLETED | OUTPATIENT
Start: 2018-10-23 | End: 2018-10-23

## 2018-10-23 RX ADMIN — LORAZEPAM 1 MG: 1 TABLET ORAL at 10:34

## 2018-10-23 RX ADMIN — GABAPENTIN 400 MG: 100 CAPSULE ORAL at 22:44

## 2018-10-23 RX ADMIN — RISPERIDONE 2 MG: 2 TABLET ORAL at 22:44

## 2018-10-23 RX ADMIN — LORAZEPAM 0.5 MG: 0.5 TABLET ORAL at 23:04

## 2018-10-23 RX ADMIN — BENZTROPINE MESYLATE 1 MG: 1 TABLET ORAL at 22:44

## 2018-10-23 RX ADMIN — HYDROXYZINE HYDROCHLORIDE 25 MG: 25 TABLET ORAL at 22:44

## 2018-10-23 NOTE — LETTER
October 30, 2018     [facility]    Patient: Sergey Oconnell   YOB: 1970   Date of Visit: 10/23/2018       Dear Dr Zuleyka Carvalho:    Thank you for referring Sergey Oconnell to me for evaluation  Below are my notes for this consultation  If you have questions, please do not hesitate to call me  I look forward to following your patient along with you  Sincerely,        No name on file  CC: No Recipients  JARED Edwards  10/29/2018  1:51 PM  Attested  Progress Note - [address] 53 y o  female MRN: [id number]   Unit/Bed#: U 247-01 Encounter: [id number]    Behavior over the last 24 hours:  Georgette Mcknight was seen for an inpatient follow-up psychiatric visit this date  She denies any suicidal or homicidal ideation as well as any auditory of visual hallucinations during today's assessment     She appears to be tolerating the recent increase in risperidone without difficulty  She feels it is helping stabilize her mood and decreases symptoms of psychosis  Per nursing report, she has been interacting appropriately with staff and peers  Appetite and sleep are within normal limits  No paranoia or agitation noted at today's visit      ROS: no complaints    Mental Status Evaluation:    Appearance:  casually dressed, dressed appropriately   Behavior:  pleasant, cooperative   Speech:  normal rate and volume   Mood:  normal   Affect:  normal range and intensity   Thought Process:  organized, logical, coherent   Associations: intact associations   Thought Content:  normal   Perceptual Disturbances: denies auditory hallucinations when asked   Risk Potential: Suicidal ideation - None  Homicidal ideation - None  Potential for aggression - No   Sensorium:  oriented to person, place, time/date and situation   Memory:  recent and remote memory grossly intact   Consciousness:  alert and awake   Attention: Decreased attention span and concentration   Insight:  limited   Judgment: limited Gait/Station: normal gait/station and normal balance   Motor Activity: no abnormal movements     Vital signs in last 24 hours:    Temp:  [97 2 °F (36 2 °C)-98 2 °F (36 8 °C)] 97 2 °F (36 2 °C)  HR:  [62-88] 62  Resp:  [16-18] 18  BP: ()/(58-67) 95/58    Laboratory results:  I have personally reviewed all pertinent laboratory/tests results  Progress Toward Goals: progressing    Assessment/Plan   Principal Problem:    Schizoaffective disorder, bipolar type (HCC)  Active Problems:    Uncomplicated alcohol dependence ([facility])    LYNN (generalized anxiety disorder)    Post-traumatic stress disorder, chronic    URI (upper respiratory infection)    Recommended Treatment:   1  Will continue current psychiatric medications as prescribed  2  Will continue to monitor patient and adjust medications as needed  3  Discharge disposition and planning are ongoing  All current active medications have been reviewed    Encourage group therapy, milieu therapy and occupational therapy  Behavioral Health checks every 5 minutes      Current Facility-Administered Medications:  acetaminophen 650 mg Oral Q6H PRN Isis Lama, CRNP   acetaminophen 650 mg Oral Q4H PRN Isis Lama, CRNP   acetaminophen 975 mg Oral Q6H PRN Isis Lama, CRNP   albuterol 2 puff Inhalation Q4H PRN Fletcher Marley MD   aluminum-magnesium hydroxide-simethicone 30 mL Oral Q4H PRN Isis Lama, RENATANP   benztropine 1 mg Intramuscular Q6H PRN Trevor Fletcher MD   benztropine 1 mg Oral TID Jonathan Montanez MD   benztropine 1 mg Oral Q6H PRN Trevor Fletcher MD   fluticasone 1 spray Each Nare Daily Fletcher Marley MD   folic acid 1 mg Oral Daily Trevor Fletcher MD   gabapentin 400 mg Oral 4x Daily Jonathan Montanez MD   haloperidol 5 mg Oral Q6H PRN Trevor Fletcher MD   haloperidol lactate 5 mg Intramuscular Q6H PRN Trevor Fletcher MD   hydrocortisone  Rectal 4x Daily PRN Fletcher Marley MD   hydrOXYzine HCL 25 mg Oral Q6H PRN Raymond Lewis JARED Lama   hydrOXYzine HCL 25 mg Oral TID Chucho Barraza MD   influenza vaccine 0 5 mL Intramuscular Prior to discharge JARED Champion   lamoTRIgine 25 mg Oral Daily JARED Jones   LORazepam 1 mg Intramuscular Q6H PRN Chucho Barraza MD   LORazepam 0 5 mg Oral BID JARED Augustin   LORazepam 1 mg Oral Q6H PRN Chucho Barraza MD   magnesium hydroxide 30 mL Oral Daily PRN [place] JARED Bocanegra   multivitamin-minerals 1 tablet Oral Daily Chucho Barraza MD   nicotine 1 patch Transdermal Daily JARED Augustin   OLANZapine 10 mg Intramuscular Q3H PRN Chucho Barraza MD   risperiDONE 1 mg Oral TID JARED Augustin   risperiDONE 4 mg Oral HS JARED Augustin   thiamine 100 mg Oral Daily Chucho Barraza MD   traZODone 50 mg Oral HS PRN JARED Jones       Risks / Benefits of Treatment:    Risks, benefits, and possible side effects of medications explained to patient and patient verbalizes understanding and agreement for treatment  Counseling / Coordination of Care:      Patient's progress discussed with staff in treatment team meeting  Medications, treatment progress and treatment plan reviewed with patient  Attestation signed by Ambrosio Cantor MD at 10/29/2018  2:12 PM:  I supervised the Advanced Practitioner  I discussed the case with the Advanced Practitioner and Treatment Team  I reviewed and agree with the Advanced Practitioner's note, findings, medication orders and recommendations      Ambrosio Cantor MD 10/29/18    Osmany Falk Austin De Santiago   10/28/2018 12:57 PM  Attested  Progress Note - [facility] 53 y o  female MRN: [id number]  Unit/Bed#: -01 Encounter: [id number]    Assessment/Plan   Principal Problem:    Schizoaffective disorder, bipolar type ([facility] )  Active Problems:    Uncomplicated alcohol dependence ([facility] )    LYNN (generalized anxiety disorder)    Post-traumatic stress disorder, chronic    URI (upper respiratory infection)   This is a this progress note on patient  Patient was seen and discussed with the nursing staff  On examination today, the patient is reporting continued outbursts related to auditory hallucinations  She also reports racing thoughts in her head that she can't control  She likes the Lamictal but again, I cautioned her that we need to increase that dose slowly  She is agreeing to an increase of Risperdal taken to the max dose of 7 mg daily  She likes a divided so will do 3 mg during the day and 4 mg at night  As soon as we can increase the Lamictal which will not be for another 10-11 days, we will get her up to 50 mg and titrate from there  She continues to require inpatient care and treatment and from what I understand, according to her treatment team, they are looking for a longer term placement in the form of any ACE unit  Behavior over the last 24 hours:  unchanged  Sleep: normal  Appetite: normal  Medication side effects: No  ROS: no complaints    Mental Status Evaluation:  Appearance:  casually dressed and disheveled   Behavior:  psychomotor agitation   Speech:  pressured   Mood:  irritable and labile   Affect:  constricted   Thought Process:  disorganized, flight of ideas, illogical, loose associations and perserverative   Thought Content:  delusions  obsessive/rumination   Perceptual Disturbances: Auditory hallucinations without commands   Risk Potential: Potential for Aggression Yes verbally aggressive at times   Sensorium:  person, place and time/date   Cognition:  grossly intact   Consciousness:  alert and awake    Attention: attention span and concentration were age appropriate   Insight:  Poor   Judgment: Poor   Gait/Station: normal gait/station   Motor Activity: no abnormal movements     Progress Toward Goals: Remains labile and psychotic    Recommended Treatment: Continue with group therapy, milieu therapy and occupational therapy        Risks, benefits and possible side effects of Medications:   Risks, benefits, and possible side effects of medications explained to patient and patient verbalizes understanding  Medications: planned medication changes: Increase Risperdal     Labs: Reviewed    Counseling / Coordination of Care  Total floor / unit time spent today 25 minutes  Greater than 50% of total time was spent with the patient and / or family counseling and / or coordination of care  A description of the counseling / coordination of care: 15  Attestation signed by Adelaida Yanez MD at 10/28/2018  5:22 PM:  I supervised the Advanced Practitioner  I discussed the case with the Advanced Practitioner and Treatment Team  I reviewed and agree with the Advanced Practitioner's note, findings, medication orders and recommendations  Britton Grimm MD 10/28/18    Austin Vaughn  10/27/2018 12:20 PM  Attested  Progress Note - Michael Mercado 53 y o  female MRN: [id number]  Unit/Bed#: -01 Encounter: [id number]    Assessment/Plan   Principal Problem:    Schizoaffective disorder, bipolar type ([facility] )  Active Problems:    Uncomplicated alcohol dependence ([facility] )    LYNN (generalized anxiety disorder)    Post-traumatic stress disorder, chronic    URI (upper respiratory infection)   Patient was seen and discussed with the nursing staff  On examination today, the patient is reporting that she is still having breakthrough symptoms of agitation verbal aggression and acting out  She is also responding to internal stimuli  He has been receiving p r n  Risperdal   We discussed options and we are going to increase the Risperdal to a total dose from 4 mg a day to 5 mg a day  We will continue to monitor patient here in the hospital and monitor response to her verbal aggression and psychosis      Behavior over the last 24 hours:  regressed  Sleep: normal  Appetite: normal  Medication side effects: No  ROS: no complaints    Mental Status Evaluation:  Appearance:  casually dressed and disheveled   Behavior:  psychomotor agitation   Speech:  pressured   Mood:  labile   Affect:  labile   Thought Process:  disorganized, flight of ideas and loose associations   Thought Content:  delusions  obsessive/rumination and obsessions   Perceptual Disturbances: Auditory hallucinations without commands   Risk Potential: Potential for Aggression Yes Verbal aggression   Sensorium:  person, place and time/date   Cognition:  grossly intact   Consciousness:  alert and awake    Attention: attention span appeared shorter than expected for age   Insight:  age appropriate and poor   Judgment: poor   Gait/Station: normal gait/station   Motor Activity: no abnormal movements     Progress Toward Goals: No change    Recommended Treatment: Continue with group therapy, milieu therapy and occupational therapy  Risks, benefits and possible side effects of Medications:   Risks, benefits, and possible side effects of medications explained to patient and patient verbalizes understanding  Medications: planned medication changes: Increase Risperdal     Labs: Reviewed    Counseling / Coordination of Care  Total floor / unit time spent today 25 minutes  Greater than 50% of total time was spent with the patient and / or family counseling and / or coordination of care  A description of the counseling / coordination of care: 15  Attestation signed by Arely Fowler MD at 10/27/2018  7:41 PM:  I supervised the Advanced Practitioner  I discussed the case with the Advanced Practitioner and Treatment Team  I reviewed and agree with the Advanced Practitioner's note, findings, medication orders and recommendations      Walker Paget, MD 10/27/18    Austin Gu  10/26/2018  2:16 PM  Attested  Progress Note - [facility] 53 y o  female MRN: [id number]  Unit/Bed#: [facility] 254-02 Encounter: [id number]    Assessment/Plan   Principal Problem:    Schizoaffective disorder, bipolar type ([facility] )  Active Problems:    Uncomplicated alcohol dependence (HCC)    LYNN (generalized anxiety disorder)    Post-traumatic stress disorder, chronic    URI (upper respiratory infection)   Patient was seen and discussed with the multi-disciplinary treatment team   The patient had been very loud last night psychotic bizarre screaming and yelling to the point where her roommate had have her removed  Patient remains quite psychotic and bizarre and her medications have been adjusted accordingly  I was just informed by her treatment team that her case management services due to her low looking now for more of an extended stay environment for this patient  They are asking for referral to the [facility] unit  This patient requires long-term care and treatment secondary to her continued persistent psychosis mood instability lability and her inability to stay away from alcohol use  Behavior over the last 24 hours:  unchanged  Sleep: insomnia  Appetite: normal  Medication side effects: No  ROS: no complaints    Mental Status Evaluation:  Appearance:  disheveled   Behavior:  evasive and guarded   Speech:  pressured   Mood:  labile   Affect:  labile   Thought Process:  disorganized, flight of ideas and tangential   Thought Content:  delusions  obsessive/rumination   Perceptual Disturbances: Auditory hallucinations without commands   Risk Potential: Potential for Aggression Yes Easily agitated   Sensorium:  person, place and time/date   Cognition:  grossly intact   Consciousness:  alert and awake    Attention: attention span appeared shorter than expected for age   Insight:  poor   Judgment: poor   Gait/Station: normal gait/station   Motor Activity: no abnormal movements     Progress Toward Goals: Poor    Recommended Treatment: Continue with group therapy, milieu therapy and occupational therapy        Risks, benefits and possible side effects of Medications:   Risks, benefits, and possible side effects of medications explained to patient and patient verbalizes understanding  Medications: planned medication changes: Increase Risperdal     Labs: Reviewed    Counseling / Coordination of Care  Total floor / unit time spent today 25 minutes  Greater than 50% of total time was spent with the patient and / or family counseling and / or coordination of care  A description of the counseling / coordination of care: 15  Attestation signed by Dulce Alvarez MD at 10/26/2018  6:49 PM:  I supervised the Advanced Practitioner  I discussed the case with the Advanced Practitioner and Treatment Team  I reviewed and agree with the Advanced Practitioner's note, findings, medication orders and recommendations      Chema Barcenas MD 10/26/18

## 2018-10-23 NOTE — PATIENT SAFETY
CIS sat w/ pt on a 1:1 for safety from 10:00 - 12:15 in the secure holding rm #7  Pt was asleep, breathing at normal resting rate and had no issues

## 2018-10-23 NOTE — ED NOTES
CIS met w/pt  Pt presents due to SI/HI  Pt arrived intoxicated talking about wanting to die and having killed someone else  Pt is now medically clear and A & O X 4  Pt continues to express SI w/ no specific plan  Pt is experiencing increased depression and increased anxiety  Pt also experiencing paranoid delusions and delusions of granduer  Pt is unable to identify her delusions from reality at this time and is a danger to herself and others due to same  Pt is in agreement w/ I/P psych admission and has signed a 201  Pt is refusing to be transferred to another facility and has requested to be placed here at [facility] when a bed is available

## 2018-10-23 NOTE — ED NOTES
CIS spoke to pt's ACT team worker, Silvia Lee, who is going to f/u to see who the pt spoke to this morning  Silvia Lee further stated she would be in to see the pt within a few hrs

## 2018-10-23 NOTE — ED NOTES
CIS spoke to Harris at [facility], [phone], who took clinical information and is aware that we currently have no beds available, however, the pt is refusing to be transferred anywhere else due to her paranoia and has requested to wait in the ED until a bed is available here at [facility]  CIS was informed the [facility] physician would have to be involved for pre-authorization because the pt's ACT worker has not yet seen her at the hospital  CIS awaiting a call back for pre-cert

## 2018-10-23 NOTE — ED NOTES
CIS received a call back from Eduarda Vazquez at [facility] who stated the pt has been authorized for 3 days I/P psych once a bed is obtained, CIS will need to call back for auth once a bed is obtained
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
## 2018-10-24 LAB
AMPHETAMINES SERPL QL SCN: NEGATIVE
BARBITURATES UR QL: NEGATIVE
BENZODIAZ UR QL: NEGATIVE
COCAINE UR QL: NEGATIVE
METHADONE UR QL: NEGATIVE
OPIATES UR QL SCN: NEGATIVE
PCP UR QL: NEGATIVE
THC UR QL: NEGATIVE
TSH SERPL DL<=0.05 MIU/L-ACNC: 1.14 UIU/ML (ref 0.45–5.33)

## 2018-10-24 PROCEDURE — 80307 DRUG TEST PRSMV CHEM ANLYZR: CPT | Performed by: NURSE PRACTITIONER

## 2018-10-24 PROCEDURE — 84443 ASSAY THYROID STIM HORMONE: CPT | Performed by: NURSE PRACTITIONER

## 2018-10-24 PROCEDURE — 86592 SYPHILIS TEST NON-TREP QUAL: CPT | Performed by: NURSE PRACTITIONER

## 2018-10-24 RX ORDER — BENZTROPINE MESYLATE 1 MG/1
1 TABLET ORAL EVERY 6 HOURS PRN
Status: DISCONTINUED | OUTPATIENT
Start: 2018-10-24 | End: 2018-10-25

## 2018-10-24 RX ORDER — HYDROXYZINE HYDROCHLORIDE 25 MG/1
25 TABLET, FILM COATED ORAL EVERY 6 HOURS PRN
Status: DISCONTINUED | OUTPATIENT
Start: 2018-10-24 | End: 2018-10-30 | Stop reason: HOSPADM

## 2018-10-24 RX ORDER — ACETAMINOPHEN 325 MG/1
975 TABLET ORAL EVERY 6 HOURS PRN
Status: DISCONTINUED | OUTPATIENT
Start: 2018-10-24 | End: 2018-10-30 | Stop reason: HOSPADM

## 2018-10-24 RX ORDER — HYDROXYZINE HYDROCHLORIDE 25 MG/1
25 TABLET, FILM COATED ORAL 2 TIMES DAILY
Status: DISCONTINUED | OUTPATIENT
Start: 2018-10-24 | End: 2018-10-25

## 2018-10-24 RX ORDER — BENZTROPINE MESYLATE 1 MG/1
1 TABLET ORAL 3 TIMES DAILY
Status: DISCONTINUED | OUTPATIENT
Start: 2018-10-24 | End: 2018-10-30 | Stop reason: HOSPADM

## 2018-10-24 RX ORDER — RISPERIDONE 1 MG/1
1 TABLET, ORALLY DISINTEGRATING ORAL
Status: DISCONTINUED | OUTPATIENT
Start: 2018-10-24 | End: 2018-10-29

## 2018-10-24 RX ORDER — MAGNESIUM HYDROXIDE/ALUMINUM HYDROXICE/SIMETHICONE 120; 1200; 1200 MG/30ML; MG/30ML; MG/30ML
30 SUSPENSION ORAL EVERY 4 HOURS PRN
Status: DISCONTINUED | OUTPATIENT
Start: 2018-10-24 | End: 2018-10-30 | Stop reason: HOSPADM

## 2018-10-24 RX ORDER — TRAZODONE HYDROCHLORIDE 50 MG/1
50 TABLET ORAL
Status: DISCONTINUED | OUTPATIENT
Start: 2018-10-24 | End: 2018-10-30 | Stop reason: HOSPADM

## 2018-10-24 RX ORDER — BENZTROPINE MESYLATE 1 MG/ML
1 INJECTION INTRAMUSCULAR; INTRAVENOUS EVERY 6 HOURS PRN
Status: DISCONTINUED | OUTPATIENT
Start: 2018-10-24 | End: 2018-10-25

## 2018-10-24 RX ORDER — HALOPERIDOL 5 MG/ML
5 INJECTION INTRAMUSCULAR EVERY 6 HOURS PRN
Status: DISCONTINUED | OUTPATIENT
Start: 2018-10-24 | End: 2018-10-25

## 2018-10-24 RX ORDER — LORAZEPAM 2 MG/ML
2 INJECTION INTRAMUSCULAR EVERY 6 HOURS PRN
Status: DISCONTINUED | OUTPATIENT
Start: 2018-10-24 | End: 2018-10-25

## 2018-10-24 RX ORDER — ACETAMINOPHEN 325 MG/1
650 TABLET ORAL EVERY 6 HOURS PRN
Status: DISCONTINUED | OUTPATIENT
Start: 2018-10-24 | End: 2018-10-30 | Stop reason: HOSPADM

## 2018-10-24 RX ORDER — LORAZEPAM 1 MG/1
1 TABLET ORAL EVERY 6 HOURS PRN
Status: DISCONTINUED | OUTPATIENT
Start: 2018-10-24 | End: 2018-10-24

## 2018-10-24 RX ORDER — ACETAMINOPHEN 325 MG/1
650 TABLET ORAL EVERY 4 HOURS PRN
Status: DISCONTINUED | OUTPATIENT
Start: 2018-10-24 | End: 2018-10-30 | Stop reason: HOSPADM

## 2018-10-24 RX ORDER — OLANZAPINE 10 MG/1
10 INJECTION, POWDER, LYOPHILIZED, FOR SOLUTION INTRAMUSCULAR
Status: DISCONTINUED | OUTPATIENT
Start: 2018-10-24 | End: 2018-10-25

## 2018-10-24 RX ORDER — LORAZEPAM 1 MG/1
1 TABLET ORAL EVERY 4 HOURS PRN
Status: DISCONTINUED | OUTPATIENT
Start: 2018-10-24 | End: 2018-10-25

## 2018-10-24 RX ORDER — LORAZEPAM 0.5 MG/1
0.5 TABLET ORAL ONCE
Status: COMPLETED | OUTPATIENT
Start: 2018-10-24 | End: 2018-10-24

## 2018-10-24 RX ORDER — HALOPERIDOL 5 MG
5 TABLET ORAL EVERY 6 HOURS PRN
Status: DISCONTINUED | OUTPATIENT
Start: 2018-10-24 | End: 2018-10-25

## 2018-10-24 RX ADMIN — HYDROXYZINE HYDROCHLORIDE 25 MG: 25 TABLET ORAL at 17:17

## 2018-10-24 RX ADMIN — GABAPENTIN 400 MG: 100 CAPSULE ORAL at 21:07

## 2018-10-24 RX ADMIN — ACETAMINOPHEN 975 MG: 325 TABLET ORAL at 21:14

## 2018-10-24 RX ADMIN — BENZTROPINE MESYLATE 1 MG: 1 TABLET ORAL at 16:56

## 2018-10-24 RX ADMIN — LORAZEPAM 1 MG: 1 TABLET ORAL at 21:07

## 2018-10-24 RX ADMIN — BENZTROPINE MESYLATE 1 MG: 1 TABLET ORAL at 21:07

## 2018-10-24 RX ADMIN — BENZTROPINE MESYLATE 1 MG: 1 TABLET ORAL at 10:32

## 2018-10-24 RX ADMIN — LORAZEPAM 0.5 MG: 0.5 TABLET ORAL at 10:32

## 2018-10-24 RX ADMIN — GABAPENTIN 400 MG: 100 CAPSULE ORAL at 17:17

## 2018-10-24 RX ADMIN — GABAPENTIN 400 MG: 100 CAPSULE ORAL at 10:41

## 2018-10-24 RX ADMIN — RISPERIDONE 2 MG: 2 TABLET ORAL at 21:07

## 2018-10-24 RX ADMIN — HYDROXYZINE HYDROCHLORIDE 25 MG: 25 TABLET ORAL at 10:32

## 2018-10-24 NOTE — PROGRESS NOTES
Patient came to the unit on a 201 from the [facility]  Calm and cooperative  Cooperative with admission  Patient verbalized that she called the police on herself because she had thoughts of harming herself due to her increased paranoia and irritability, resulting in explosive episodes  Patient believes people are out to get her  Patient verbalized feeling safe in the hospital but suicidal at home  Patient feels she needs long term placement  Good eye contact  Patient verbalized having many V hallucinations  Patient has been living with a friend and has been having many angry outbursts which is causing some conflict between the two of them  Patient verbalized that she has been medication compliance  Patient admits that she has participated in ECT twice in the past   Patient denies SI and HI currently  No behavioral problems  Alert and oriented  Able to make needs known  No signs or symptoms of distress  SP1 and Q 7 minute checks will be maintained for patient safety  Will continue to monitor

## 2018-10-24 NOTE — ED NOTES
Met with patient and provided him an update regarding bed search and placement  Patient continues to request admission to [facility]  Patient appears depressed with and irritable edge  Patient also continues to report delusional thoughts with paranoia as well as SI/HI

## 2018-10-24 NOTE — ED NOTES
Crisis received a call from [facility] Vivien KING, after she met with her  Vivien Dickinson stated Patient is not at baseline at this time and is in agreement with admission

## 2018-10-24 NOTE — ED NOTES
Patient given Gabapentin 400 mg earlier than scheduled time due to last dose being received at 2244 last night        Herminio Macedo  10/24/18 1042
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
## 2018-10-24 NOTE — ED NOTES
Patient is accepted at [facility]  Patient is accepted by Kuldeep Perez Per Joanie Sears RN     Patient may go to the floor following discharge         Nurse report is to be called to [phone] prior to patient transfer

## 2018-10-24 NOTE — ED NOTES
Call placed to [facility] for authorization number, spoke with [facility]  Awaiting call back for authorization number

## 2018-10-24 NOTE — ED NOTES
Insurance Authorization: [employer] [place]  Phone call placed to [facility] number: [phone]  Spoke to [place]   3 days approved    Level of care: IP Psychiatric Care on a 201  Review on 10/26/18  Authorization # [id number]

## 2018-10-25 PROBLEM — F43.12 POST-TRAUMATIC STRESS DISORDER, CHRONIC: Chronic | Status: ACTIVE | Noted: 2017-07-06

## 2018-10-25 PROBLEM — E72.20 SERUM AMMONIA INCREASED (HCC): Status: RESOLVED | Noted: 2017-10-26 | Resolved: 2018-10-25

## 2018-10-25 PROBLEM — F41.1 GAD (GENERALIZED ANXIETY DISORDER): Chronic | Status: ACTIVE | Noted: 2017-07-06

## 2018-10-25 PROBLEM — J01.90 ACUTE SINUSITIS: Status: RESOLVED | Noted: 2017-12-20 | Resolved: 2018-10-25

## 2018-10-25 PROBLEM — D72.829 LEUKOCYTOSIS: Status: RESOLVED | Noted: 2018-10-14 | Resolved: 2018-10-25

## 2018-10-25 PROBLEM — K59.00 CONSTIPATION: Status: RESOLVED | Noted: 2017-12-27 | Resolved: 2018-10-25

## 2018-10-25 PROBLEM — F10.20 UNCOMPLICATED ALCOHOL DEPENDENCE (HCC): Chronic | Status: ACTIVE | Noted: 2017-10-26

## 2018-10-25 LAB
ALBUMIN SERPL BCP-MCNC: 4.1 G/DL (ref 3.5–5.7)
ALP SERPL-CCNC: 72 U/L (ref 40–150)
ALT SERPL W P-5'-P-CCNC: 13 U/L (ref 7–52)
ANION GAP SERPL CALCULATED.3IONS-SCNC: 7 MMOL/L (ref 4–13)
AST SERPL W P-5'-P-CCNC: 16 U/L (ref 13–39)
BASOPHILS # BLD AUTO: 0 THOUSANDS/ΜL (ref 0–0.1)
BASOPHILS NFR BLD AUTO: 1 % (ref 0–2)
BILIRUB SERPL-MCNC: 0.2 MG/DL (ref 0.2–1)
BUN SERPL-MCNC: 10 MG/DL (ref 7–25)
CALCIUM SERPL-MCNC: 9.7 MG/DL (ref 8.6–10.5)
CHLORIDE SERPL-SCNC: 105 MMOL/L (ref 98–107)
CHOLEST SERPL-MCNC: 296 MG/DL (ref 0–200)
CO2 SERPL-SCNC: 25 MMOL/L (ref 21–31)
CREAT SERPL-MCNC: 0.58 MG/DL (ref 0.6–1.2)
EOSINOPHIL # BLD AUTO: 0.2 THOUSAND/ΜL (ref 0–0.61)
EOSINOPHIL NFR BLD AUTO: 3 % (ref 0–5)
ERYTHROCYTE [DISTWIDTH] IN BLOOD BY AUTOMATED COUNT: 14.3 % (ref 11.5–14.5)
EST. AVERAGE GLUCOSE BLD GHB EST-MCNC: 94 MG/DL
GFR SERPL CREATININE-BSD FRML MDRD: 110 ML/MIN/1.73SQ M
GLUCOSE SERPL-MCNC: 98 MG/DL (ref 65–99)
HBA1C MFR BLD: 4.9 % (ref 4.2–6.3)
HCG SERPL QL: NEGATIVE
HCT VFR BLD AUTO: 45.4 % (ref 34.8–46.1)
HDLC SERPL-MCNC: 69 MG/DL (ref 40–60)
HGB BLD-MCNC: 14.7 G/DL (ref 12–16)
LDLC SERPL CALC-MCNC: 170 MG/DL (ref 75–193)
LYMPHOCYTES # BLD AUTO: 2.4 THOUSANDS/ΜL (ref 0.6–4.47)
LYMPHOCYTES NFR BLD AUTO: 32 % (ref 21–51)
MCH RBC QN AUTO: 31.6 PG (ref 26–34)
MCHC RBC AUTO-ENTMCNC: 32.3 G/DL (ref 31–37)
MCV RBC AUTO: 98 FL (ref 81–99)
MONOCYTES # BLD AUTO: 0.5 THOUSAND/ΜL (ref 0.17–1.22)
MONOCYTES NFR BLD AUTO: 7 % (ref 2–12)
NEUTROPHILS # BLD AUTO: 4.3 THOUSANDS/ΜL (ref 1.4–6.5)
NEUTS SEG NFR BLD AUTO: 57 % (ref 42–75)
NONHDLC SERPL-MCNC: 227 MG/DL
NRBC BLD AUTO-RTO: 0 /100 WBCS
PLATELET # BLD AUTO: 356 THOUSANDS/UL (ref 149–390)
PMV BLD AUTO: 7.6 FL (ref 8.6–11.7)
POTASSIUM SERPL-SCNC: 4.5 MMOL/L (ref 3.5–5.5)
PROT SERPL-MCNC: 6.7 G/DL (ref 6.4–8.9)
RBC # BLD AUTO: 4.65 MILLION/UL (ref 3.9–5.2)
RPR SER QL: NORMAL
SODIUM SERPL-SCNC: 137 MMOL/L (ref 134–143)
TRIGL SERPL-MCNC: 285 MG/DL (ref 44–166)
WBC # BLD AUTO: 7.5 THOUSAND/UL (ref 4.8–10.8)

## 2018-10-25 PROCEDURE — 83036 HEMOGLOBIN GLYCOSYLATED A1C: CPT | Performed by: PSYCHIATRY & NEUROLOGY

## 2018-10-25 PROCEDURE — 85025 COMPLETE CBC W/AUTO DIFF WBC: CPT | Performed by: NURSE PRACTITIONER

## 2018-10-25 PROCEDURE — 80053 COMPREHEN METABOLIC PANEL: CPT | Performed by: NURSE PRACTITIONER

## 2018-10-25 PROCEDURE — 99223 1ST HOSP IP/OBS HIGH 75: CPT | Performed by: PSYCHIATRY & NEUROLOGY

## 2018-10-25 PROCEDURE — 84703 CHORIONIC GONADOTROPIN ASSAY: CPT | Performed by: NURSE PRACTITIONER

## 2018-10-25 PROCEDURE — 80061 LIPID PANEL: CPT | Performed by: NURSE PRACTITIONER

## 2018-10-25 PROCEDURE — 86592 SYPHILIS TEST NON-TREP QUAL: CPT | Performed by: PSYCHIATRY & NEUROLOGY

## 2018-10-25 RX ORDER — LAMOTRIGINE 25 MG/1
25 TABLET ORAL DAILY
Status: DISCONTINUED | OUTPATIENT
Start: 2018-10-25 | End: 2018-10-26

## 2018-10-25 RX ORDER — HYDROXYZINE HYDROCHLORIDE 25 MG/1
25 TABLET, FILM COATED ORAL 3 TIMES DAILY
Status: DISCONTINUED | OUTPATIENT
Start: 2018-10-25 | End: 2018-10-30 | Stop reason: HOSPADM

## 2018-10-25 RX ORDER — NICOTINE 21 MG/24HR
1 PATCH, TRANSDERMAL 24 HOURS TRANSDERMAL DAILY
Status: DISCONTINUED | OUTPATIENT
Start: 2018-10-25 | End: 2018-10-28

## 2018-10-25 RX ORDER — OLANZAPINE 10 MG/1
10 INJECTION, POWDER, LYOPHILIZED, FOR SOLUTION INTRAMUSCULAR
Status: DISCONTINUED | OUTPATIENT
Start: 2018-10-25 | End: 2018-10-30 | Stop reason: HOSPADM

## 2018-10-25 RX ORDER — GABAPENTIN 100 MG/1
100 CAPSULE ORAL 3 TIMES DAILY
Status: DISCONTINUED | OUTPATIENT
Start: 2018-10-25 | End: 2018-10-25

## 2018-10-25 RX ORDER — HALOPERIDOL 5 MG
5 TABLET ORAL EVERY 6 HOURS PRN
Status: DISCONTINUED | OUTPATIENT
Start: 2018-10-25 | End: 2018-10-30 | Stop reason: HOSPADM

## 2018-10-25 RX ORDER — LORAZEPAM 1 MG/1
1 TABLET ORAL EVERY 6 HOURS PRN
Status: DISCONTINUED | OUTPATIENT
Start: 2018-10-25 | End: 2018-10-30 | Stop reason: HOSPADM

## 2018-10-25 RX ORDER — LORAZEPAM 2 MG/ML
1 INJECTION INTRAMUSCULAR EVERY 6 HOURS PRN
Status: DISCONTINUED | OUTPATIENT
Start: 2018-10-25 | End: 2018-10-30 | Stop reason: HOSPADM

## 2018-10-25 RX ORDER — BENZTROPINE MESYLATE 1 MG/1
1 TABLET ORAL EVERY 6 HOURS PRN
Status: DISCONTINUED | OUTPATIENT
Start: 2018-10-25 | End: 2018-10-30 | Stop reason: HOSPADM

## 2018-10-25 RX ORDER — RISPERIDONE 1 MG/1
1 TABLET, FILM COATED ORAL DAILY
Status: DISCONTINUED | OUTPATIENT
Start: 2018-10-25 | End: 2018-10-27

## 2018-10-25 RX ORDER — LORAZEPAM 1 MG/1
1 TABLET ORAL 4 TIMES DAILY PRN
Status: DISCONTINUED | OUTPATIENT
Start: 2018-10-25 | End: 2018-10-25

## 2018-10-25 RX ORDER — THIAMINE MONONITRATE (VIT B1) 100 MG
100 TABLET ORAL DAILY
Status: DISCONTINUED | OUTPATIENT
Start: 2018-10-25 | End: 2018-10-30 | Stop reason: HOSPADM

## 2018-10-25 RX ORDER — LORAZEPAM 1 MG/1
1 TABLET ORAL 3 TIMES DAILY
Status: DISCONTINUED | OUTPATIENT
Start: 2018-10-25 | End: 2018-10-26

## 2018-10-25 RX ORDER — BENZTROPINE MESYLATE 1 MG/ML
1 INJECTION INTRAMUSCULAR; INTRAVENOUS EVERY 6 HOURS PRN
Status: DISCONTINUED | OUTPATIENT
Start: 2018-10-25 | End: 2018-10-30 | Stop reason: HOSPADM

## 2018-10-25 RX ORDER — HALOPERIDOL 5 MG/ML
5 INJECTION INTRAMUSCULAR EVERY 6 HOURS PRN
Status: DISCONTINUED | OUTPATIENT
Start: 2018-10-25 | End: 2018-10-30 | Stop reason: HOSPADM

## 2018-10-25 RX ORDER — FOLIC ACID 1 MG/1
1 TABLET ORAL DAILY
Status: DISCONTINUED | OUTPATIENT
Start: 2018-10-25 | End: 2018-10-30 | Stop reason: HOSPADM

## 2018-10-25 RX ADMIN — GABAPENTIN 400 MG: 100 CAPSULE ORAL at 08:33

## 2018-10-25 RX ADMIN — LAMOTRIGINE 25 MG: 25 TABLET ORAL at 11:31

## 2018-10-25 RX ADMIN — BENZTROPINE MESYLATE 1 MG: 1 TABLET ORAL at 08:34

## 2018-10-25 RX ADMIN — HYDROXYZINE HYDROCHLORIDE 25 MG: 25 TABLET ORAL at 08:33

## 2018-10-25 RX ADMIN — FOLIC ACID 1 MG: 1 TABLET ORAL at 08:33

## 2018-10-25 RX ADMIN — HYDROXYZINE HYDROCHLORIDE 25 MG: 25 TABLET ORAL at 21:53

## 2018-10-25 RX ADMIN — BENZTROPINE MESYLATE 1 MG: 1 TABLET ORAL at 16:17

## 2018-10-25 RX ADMIN — HYDROXYZINE HYDROCHLORIDE 25 MG: 25 TABLET ORAL at 16:17

## 2018-10-25 RX ADMIN — MAGNESIUM HYDROXIDE 30 ML: 400 SUSPENSION ORAL at 22:01

## 2018-10-25 RX ADMIN — GABAPENTIN 400 MG: 100 CAPSULE ORAL at 17:55

## 2018-10-25 RX ADMIN — LORAZEPAM 1 MG: 1 TABLET ORAL at 21:54

## 2018-10-25 RX ADMIN — GABAPENTIN 400 MG: 100 CAPSULE ORAL at 21:53

## 2018-10-25 RX ADMIN — LORAZEPAM 1 MG: 1 TABLET ORAL at 08:33

## 2018-10-25 RX ADMIN — RISPERIDONE 2 MG: 2 TABLET ORAL at 21:54

## 2018-10-25 RX ADMIN — NICOTINE 1 PATCH: 21 PATCH, EXTENDED RELEASE TRANSDERMAL at 11:31

## 2018-10-25 RX ADMIN — GABAPENTIN 400 MG: 100 CAPSULE ORAL at 11:31

## 2018-10-25 RX ADMIN — RISPERIDONE 1 MG: 1 TABLET ORAL at 11:31

## 2018-10-25 RX ADMIN — ACETAMINOPHEN 650 MG: 325 TABLET ORAL at 04:54

## 2018-10-25 RX ADMIN — BENZTROPINE MESYLATE 1 MG: 1 TABLET ORAL at 21:54

## 2018-10-25 RX ADMIN — Medication 1 TABLET: at 08:33

## 2018-10-25 RX ADMIN — Medication 100 MG: at 08:33

## 2018-10-25 NOTE — PLAN OF CARE
Alteration in Thoughts and Perception     Treatment Goal: Gain control of psychotic behaviors/thinking, reduce/eliminate presenting symptoms and demonstrate improved reality functioning upon discharge Progressing     Refrain from acting on delusional thinking/internal stimuli Progressing     Agree to be compliant with medication regime, as prescribed and report medication side effects Progressing     Complete daily ADLs, including personal hygiene independently, as able Progressing        Anxiety     Anxiety is at manageable level Progressing        Depression     Verbalize thoughts and feelings Progressing     Refrain from [place] Discharge to post-acute care or home with appropriate resources Progressing        Ineffective Coping     Participates in unit activities Progressing        PSYCHOSIS     Will report no hallucinations or delusions Progressing        Risk for Self Injury/Neglect     Refrain from harming self Progressing        Risk for Violence/Aggression Toward Others     Refrain from harming others Progressing     Identify appropriate positive anger management techniques Progressing        SLEEP DISTURBANCE     Will exhibit normal sleeping pattern Progressing
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
## 2018-10-25 NOTE — SOCIAL WORK
Pt requesting for sw to contact Justus Mcginnis as she has court date 11/2/18 and she will be unable to obtain her finger prints and legal assistance while she is hospitalized; Pt notified to call the  herself from unit to notify them of her hospitalization; Pt contacts judges office and notified them of her situation in the hospital and pt reports that her court date is not until 11/7/18

## 2018-10-25 NOTE — SOCIAL WORK
SW received call from pt's [facility] ACT team member Dalia ANN confirmed pt's admission and provided update on pt  SETH explained that discharge is not being discussed at this time as pt just arrived on unit  SETH informed them that pt is asking about inpatient substance abuse and group home living  SW will continue to keep ACT team updated

## 2018-10-25 NOTE — SOCIAL WORK
SW contacts patients daughter Uri Carrier; provided update on patient; daughter has no questions or concerns at this time; appreciative of phone call from social work; agreeable to call with any questions or concerns

## 2018-10-25 NOTE — PROGRESS NOTES
Pt is flat and has multiple somatic complaints  Pt c/o lower back pain and is requesting lidocaine patches  Asked pt if she has an active Rx for lidocaine and she stated "no "  Informed pt that the Dr 's would most likely not prescribe her those while she is here  Pt verbally understands  Pt also requesting to see medical because her ears were bothering her and a "pick arun" inside both of them  Will pass this on to AM staff  Pt was a little agitated that her Ativan was being tapered  Pt had no visible S/S of alcohol WD   CIWA at this time is 0  Pt c/o room being too cold to which her room was switched to the other end of the acharya  Pt denies having any anxiety although she appears to be a little anxious  Pt denies having any SI at this time  Pt states "I don't have it while I'm here, but when I'm not in the hospital is when I have it "  Pt aware of new meds being added to her regimen  Pt has been compliant with meds and meals  Pt is visible in the community and attends groups  Pt socializes with select peers  Will continue to monitor and assess
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
## 2018-10-25 NOTE — SOCIAL WORK
SETH attempts to contact [facility] phone#[phone] left message on answering machine requesting call back

## 2018-10-25 NOTE — PROGRESS NOTES
Pt has been withdrawn to room  Yelling out  Patient reports the yelling out is due to intense 10/10 calf pain bi-laterally  PRN Tylenol 975 was given at 2114  Pt reports SI that "comes and goes " Denies any at this time  No plan, reports they are "only thoughts " Pt was calm and cooperative with assessment  Pt rates anxiety and depression 8/10  Reports a new onset of intermittent irritability and outbursts with no known triggers  Compliant with HS medications  Will continue to monitor
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
## 2018-10-25 NOTE — PROGRESS NOTES
Pt  Observed sleeping comfortably throughout the night with no apparent distress  Q 7 minute checks in place  Will continue to monitor

## 2018-10-26 PROBLEM — J06.9 URI (UPPER RESPIRATORY INFECTION): Status: ACTIVE | Noted: 2018-10-26

## 2018-10-26 LAB — RPR SER QL: NORMAL

## 2018-10-26 PROCEDURE — 99251 PR INITL INPATIENT CONSULT NEW/ESTAB PT 20 MIN: CPT | Performed by: INTERNAL MEDICINE

## 2018-10-26 PROCEDURE — 99232 SBSQ HOSP IP/OBS MODERATE 35: CPT | Performed by: NURSE PRACTITIONER

## 2018-10-26 RX ORDER — LAMOTRIGINE 25 MG/1
25 TABLET ORAL DAILY
Status: DISCONTINUED | OUTPATIENT
Start: 2018-10-27 | End: 2018-10-30 | Stop reason: HOSPADM

## 2018-10-26 RX ORDER — FLUTICASONE PROPIONATE 50 MCG
1 SPRAY, SUSPENSION (ML) NASAL DAILY
Status: DISCONTINUED | OUTPATIENT
Start: 2018-10-26 | End: 2018-10-30 | Stop reason: HOSPADM

## 2018-10-26 RX ORDER — LORAZEPAM 0.5 MG/1
0.5 TABLET ORAL 2 TIMES DAILY
Status: DISCONTINUED | OUTPATIENT
Start: 2018-10-26 | End: 2018-10-30 | Stop reason: HOSPADM

## 2018-10-26 RX ORDER — ALBUTEROL SULFATE 90 UG/1
2 AEROSOL, METERED RESPIRATORY (INHALATION) EVERY 4 HOURS PRN
Status: DISCONTINUED | OUTPATIENT
Start: 2018-10-26 | End: 2018-10-30 | Stop reason: HOSPADM

## 2018-10-26 RX ADMIN — FOLIC ACID 1 MG: 1 TABLET ORAL at 08:37

## 2018-10-26 RX ADMIN — BENZTROPINE MESYLATE 1 MG: 1 TABLET ORAL at 08:37

## 2018-10-26 RX ADMIN — HYDROXYZINE HYDROCHLORIDE 25 MG: 25 TABLET ORAL at 08:37

## 2018-10-26 RX ADMIN — GABAPENTIN 400 MG: 100 CAPSULE ORAL at 21:58

## 2018-10-26 RX ADMIN — GABAPENTIN 400 MG: 100 CAPSULE ORAL at 16:56

## 2018-10-26 RX ADMIN — BENZTROPINE MESYLATE 1 MG: 1 TABLET ORAL at 16:56

## 2018-10-26 RX ADMIN — Medication 100 MG: at 08:37

## 2018-10-26 RX ADMIN — RISPERIDONE 3 MG: 1 TABLET ORAL at 21:58

## 2018-10-26 RX ADMIN — Medication 1 TABLET: at 08:37

## 2018-10-26 RX ADMIN — ALBUTEROL SULFATE 2 PUFF: 90 AEROSOL, METERED RESPIRATORY (INHALATION) at 22:31

## 2018-10-26 RX ADMIN — NICOTINE 1 PATCH: 21 PATCH, EXTENDED RELEASE TRANSDERMAL at 08:38

## 2018-10-26 RX ADMIN — MAGNESIUM HYDROXIDE 30 ML: 400 SUSPENSION ORAL at 22:31

## 2018-10-26 RX ADMIN — ALBUTEROL SULFATE 2 PUFF: 90 AEROSOL, METERED RESPIRATORY (INHALATION) at 16:56

## 2018-10-26 RX ADMIN — LAMOTRIGINE 25 MG: 25 TABLET ORAL at 08:37

## 2018-10-26 RX ADMIN — FLUTICASONE PROPIONATE 1 SPRAY: 50 SPRAY, METERED NASAL at 16:54

## 2018-10-26 RX ADMIN — HYDROXYZINE HYDROCHLORIDE 25 MG: 25 TABLET ORAL at 21:58

## 2018-10-26 RX ADMIN — GABAPENTIN 400 MG: 100 CAPSULE ORAL at 08:37

## 2018-10-26 RX ADMIN — LORAZEPAM 0.5 MG: 0.5 TABLET ORAL at 21:58

## 2018-10-26 RX ADMIN — HYDROXYZINE HYDROCHLORIDE 25 MG: 25 TABLET ORAL at 16:57

## 2018-10-26 RX ADMIN — GABAPENTIN 400 MG: 100 CAPSULE ORAL at 12:37

## 2018-10-26 RX ADMIN — BENZTROPINE MESYLATE 1 MG: 1 TABLET ORAL at 21:58

## 2018-10-26 RX ADMIN — RISPERIDONE 1 MG: 1 TABLET ORAL at 08:37

## 2018-10-26 RX ADMIN — HYDROCORTISONE: 25 CREAM TOPICAL at 22:31
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
## 2018-10-26 NOTE — SOCIAL WORK
SETH met with patient and patient's ACT team worker at patient's request  Patient stated that the ACT team rep placed a call to the office of the Paula Murguia's office to obtain a continuance of a Hearing scheduled for 11/1/18  The ACT Team rep stated that she was told the patient would require a  to notify the Court, in writing, of the request 24 hours prior to the hearing date  The patient was notably anxious, upset and preoccupied with the requirement, in addition to needing fingerprints as well  She stated that she would have to leave to make required arrangements as she did not even have a  as yet, and was also aware she was not ready to leave, believing she is in need of 'long term care ' Per her ACT Team representative, the Team is recommending EAC placement due to patient's repeated failure to maintain needed level of stability within the community, Reportedly, she is unable to remain in any residence more than a month or two before eviction  The representative also stated that the [facility] , Barbie Atkinson, has also recommended EAC referral  The patient, herself, stated that her recurrent sx of psychosis have become more intense and 'scary'; "I can't handle anything without feeling stressed and then hallucinate on the spot, even when I'm 'stable'  " "I see ghosts everywhere; stay in my room and won't come out, even to go to the bathroom, when there is no one in the house " She reports paranoia re: her safety "even in the hospital " Did acknowledge use of alcohol to cope at times and its ineffectiveness in sx control  CM placed a pc to the office of [address] [place] [address] [place]: [phone]; [place]: [phone], and spoke with office representative, indicating intent to fax a letter requesting continuance of the Hearing scheduled for 11/7/18   The representative directed CM to fax the letter to the above number and include patient's current status and identifying information as well as a return ph #: as she stated "the  will approve or deny the request " F/U to the request will be made by SETH on 10/29/18  Spoke with Liam Shields to inform of current Court-related issue as well as recommendation by ACT Team and reported [facility]

## 2018-10-26 NOTE — PROGRESS NOTES
Patient seen by medical provider for c/o stuffy nose and "i think  I have a bilateral ear infection"  Meds ordered , will continue to monitor

## 2018-10-26 NOTE — PROGRESS NOTES
Patient sleeping most of the time from 1900hrs until approx 0030hrs  Pt has been awake pacing the halls  Pt did try to watch tv, drink a cup of warm tea, take a warm shower  Two episodes of cursing outbursts, redirected well  Pt could be heard mumbling to herself while walking  Pt will periodically stop at the desk and talk pleasant with staff then continue walking  Pt does not want any prn medications at this time  Will continue to monitor

## 2018-10-26 NOTE — TREATMENT PLAN
TREATMENT PLAN REVIEW - [address] Enedelia Moreno 53 y o  1970 female MRN: [id number]    [address] [facility] [address] [facility] Room / Bed: Jacki Rubin/[facility] 998-20 Encounter: [id number]        Admit Date/Time:  10/23/2018  9:15 AM    Treatment Team: Attending Provider: Adelaida Yanez MD; Registered Nurse: Cady Todd, RN; Patient Care Assistant: Elina Hallman; Patient Care Assistant: Cristal Salgado;  Patient Care Assistant: Emily Corado; Patient Care Assistant: Maurilio Smith; Registered Nurse: Leora Krabbe, RN; : Gee Friedman RN; Recreational Therapist: Lamar Nath    Diagnosis: Principal Problem:    Schizoaffective disorder, bipolar type ([facility] )  Active Problems:    LYNN (generalized anxiety disorder)    Post-traumatic stress disorder, chronic    Uncomplicated alcohol dependence ([facility] )    Patient Strengths/Assets: negotiates basic needs, patient is on a voluntary commitment, supportive family      Patient Barriers/Limitations: difficulty adapting, poor past treatment response, substance abuse    Short Term Goals: decrease in psychotic symptoms, decrease in suicidal thoughts, mood stabilization    Long Term Goals: stabilization of mood, free of suicidal thoughts, resolution of psychotic symptoms    Progress Towards Goals: restarting psychiatric medications as prescribed, starting alcohol detoxification protocol    Recommended Treatment: medication management, patient medication education, group therapy, milieu therapy, continued Behavioral Health psychiatric evaluation/assessment process     Treatment Frequency: daily medication monitoring, group and milieu therapy daily, monitoring through interdisciplinary rounds, monitoring through weekly patient care conferences    Expected Discharge Date: 7 days - 11/2/2018    Discharge Plan: referral to [address] [place] [address] Team for close psychiatric monitoring, return to previous living arrangement    Treatment Plan Created/Updated By: Alonzo Reina MD

## 2018-10-26 NOTE — PROGRESS NOTES
Patient bright, alert, ate breakfast with peers, visible on unit for a period of time, and then back to bed after eating  Pt maintains good eye contact, good hygiene ( showered first thing this morning) and good appetite  Pt c/o poor sleep last night but does state "feeling better this morning"  Denies si, hi and hallucinations at this time, denies any anxiety and depression  Pt tells this nurse reason for admission is " because I had a psychotic break"  When discussing discharge planning, pt tells this nurse "I was living with a friend and will go back there" Pt declined scheduled am dose of Ativan related to weaning prior to admission info passed in report to providers  Will maintain on safety precautions and 7 minute checks  NO needs identified

## 2018-10-26 NOTE — ASSESSMENT & PLAN NOTE
· Patient notes mild sinus congestion  · No sinus tenderness on exam  · Labs all unremarkable  · Vital signs normal  · I will order Flonase for nasal congestion  · Patient requesting albuterol p r n  for bronchospasm from URI which I will also order

## 2018-10-26 NOTE — PROGRESS NOTES
Patient yelling in room to self and states " Im fine" when asked if ok by this nurse  Pt later came to desk and states "these voices are ruining my life, Im so sick of this" The atarax is very effective per patient but voices are not controlled  Will continue to monitor, no behaviors noted

## 2018-10-26 NOTE — PROGRESS NOTES
Patient triglycerides and cholesterol increased 10/25/18, providers made aware yesterday per Charge nurse

## 2018-10-26 NOTE — PROGRESS NOTES
Patient has been withdrawn to her room during the evening  Irritable edge, more controlled, not outbursts of cursing  Pt requested MOM for constipation and states she went "alittle" today but not enough  CIWA - 0  Compliant with HS medications  Able to make needs known  Will continue to monitor

## 2018-10-26 NOTE — PLAN OF CARE
DISCHARGE PLANNING - CARE MANAGEMENT     Discharge to post-acute care or home with appropriate resources Not Progressing        SLEEP DISTURBANCE     Will exhibit normal sleeping pattern Not Progressing          Alteration in Thoughts and Perception     Treatment Goal: Gain control of psychotic behaviors/thinking, reduce/eliminate presenting symptoms and demonstrate improved reality functioning upon discharge Progressing     Refrain from acting on delusional thinking/internal stimuli Progressing     Agree to be compliant with medication regime, as prescribed and report medication side effects Progressing     Complete daily ADLs, including personal hygiene independently, as able Progressing        Anxiety     Anxiety is at manageable level Progressing        Depression     Verbalize thoughts and feelings Progressing     Refrain from isolation Progressing        Ineffective Coping     Participates in unit activities Progressing        PSYCHOSIS     Will report no hallucinations or delusions Progressing        Risk for Self Injury/Neglect     Refrain from harming self Progressing        Risk for Violence/Aggression Toward Others     Refrain from harming others Progressing     Identify appropriate positive anger management techniques Progressing

## 2018-10-26 NOTE — PROGRESS NOTES
Progress Note - Behavioral Health   Thom Marrero 53 y o  female MRN: [id number]  Unit/Bed#: U 254-02 Encounter: [id number]    Assessment/Plan   Principal Problem:    Schizoaffective disorder, bipolar type ([facility] )  Active Problems:    Uncomplicated alcohol dependence ([facility] )    LYNN (generalized anxiety disorder)    Post-traumatic stress disorder, chronic    URI (upper respiratory infection)   Patient was seen and discussed with the multi-disciplinary treatment team   The patient had been very loud last night psychotic bizarre screaming and yelling to the point where her roommate had have her removed  Patient remains quite psychotic and bizarre and her medications have been adjusted accordingly  I was just informed by her treatment team that her case management services due to her low looking now for more of an extended stay environment for this patient  They are asking for referral to the [facility] unit  This patient requires long-term care and treatment secondary to her continued persistent psychosis mood instability lability and her inability to stay away from alcohol use  Behavior over the last 24 hours:  unchanged  Sleep: insomnia  Appetite: normal  Medication side effects: No  ROS: no complaints    Mental Status Evaluation:  Appearance:  disheveled   Behavior:  evasive and guarded   Speech:  pressured   Mood:  labile   Affect:  labile   Thought Process:  disorganized, flight of ideas and tangential   Thought Content:  delusions  obsessive/rumination   Perceptual Disturbances:  Auditory hallucinations without commands   Risk Potential: Potential for Aggression Yes Easily agitated   Sensorium:  person, place and time/date   Cognition:  grossly intact   Consciousness:  alert and awake    Attention: attention span appeared shorter than expected for age   Insight:  poor   Judgment: poor   Gait/Station: normal gait/station   Motor Activity: no abnormal movements     Progress Toward Goals: Poor    Recommended Treatment: Continue with group therapy, milieu therapy and occupational therapy  Risks, benefits and possible side effects of Medications:   Risks, benefits, and possible side effects of medications explained to patient and patient verbalizes understanding  Medications: planned medication changes: Increase Risperdal     Labs: Reviewed    Counseling / Coordination of Care  Total floor / unit time spent today 25 minutes  Greater than 50% of total time was spent with the patient and / or family counseling and / or coordination of care   A description of the counseling / coordination of care: 15

## 2018-10-27 PROCEDURE — 99232 SBSQ HOSP IP/OBS MODERATE 35: CPT | Performed by: NURSE PRACTITIONER

## 2018-10-27 RX ORDER — RISPERIDONE 1 MG/1
1 TABLET, FILM COATED ORAL 2 TIMES DAILY
Status: DISCONTINUED | OUTPATIENT
Start: 2018-10-27 | End: 2018-10-28

## 2018-10-27 RX ADMIN — FOLIC ACID 1 MG: 1 TABLET ORAL at 08:51

## 2018-10-27 RX ADMIN — BENZTROPINE MESYLATE 1 MG: 1 TABLET ORAL at 21:14

## 2018-10-27 RX ADMIN — HYDROXYZINE HYDROCHLORIDE 25 MG: 25 TABLET ORAL at 21:14

## 2018-10-27 RX ADMIN — GABAPENTIN 400 MG: 100 CAPSULE ORAL at 21:14

## 2018-10-27 RX ADMIN — Medication 1 TABLET: at 08:51

## 2018-10-27 RX ADMIN — FLUTICASONE PROPIONATE 1 SPRAY: 50 SPRAY, METERED NASAL at 08:53

## 2018-10-27 RX ADMIN — RISPERIDONE 1 MG: 1 TABLET, ORALLY DISINTEGRATING ORAL at 10:22

## 2018-10-27 RX ADMIN — LORAZEPAM 0.5 MG: 0.5 TABLET ORAL at 08:51

## 2018-10-27 RX ADMIN — RISPERIDONE 3 MG: 1 TABLET ORAL at 21:15

## 2018-10-27 RX ADMIN — BENZTROPINE MESYLATE 1 MG: 1 TABLET ORAL at 15:14

## 2018-10-27 RX ADMIN — NICOTINE 1 PATCH: 21 PATCH, EXTENDED RELEASE TRANSDERMAL at 09:59

## 2018-10-27 RX ADMIN — GABAPENTIN 400 MG: 100 CAPSULE ORAL at 13:00

## 2018-10-27 RX ADMIN — RISPERIDONE 1 MG: 1 TABLET, ORALLY DISINTEGRATING ORAL at 16:38

## 2018-10-27 RX ADMIN — RISPERIDONE 1 MG: 1 TABLET ORAL at 08:52

## 2018-10-27 RX ADMIN — RISPERIDONE 1 MG: 1 TABLET ORAL at 13:34

## 2018-10-27 RX ADMIN — BENZTROPINE MESYLATE 1 MG: 1 TABLET ORAL at 08:51

## 2018-10-27 RX ADMIN — MAGNESIUM HYDROXIDE 30 ML: 400 SUSPENSION ORAL at 18:26

## 2018-10-27 RX ADMIN — ALBUTEROL SULFATE 2 PUFF: 90 AEROSOL, METERED RESPIRATORY (INHALATION) at 06:42

## 2018-10-27 RX ADMIN — GABAPENTIN 400 MG: 100 CAPSULE ORAL at 08:51

## 2018-10-27 RX ADMIN — HYDROXYZINE HYDROCHLORIDE 25 MG: 25 TABLET ORAL at 15:14

## 2018-10-27 RX ADMIN — Medication 100 MG: at 08:51

## 2018-10-27 RX ADMIN — LORAZEPAM 0.5 MG: 0.5 TABLET ORAL at 21:15

## 2018-10-27 RX ADMIN — HYDROCORTISONE: 25 CREAM TOPICAL at 05:49

## 2018-10-27 RX ADMIN — GABAPENTIN 400 MG: 100 CAPSULE ORAL at 18:01

## 2018-10-27 RX ADMIN — HYDROXYZINE HYDROCHLORIDE 25 MG: 25 TABLET ORAL at 08:51

## 2018-10-27 RX ADMIN — LAMOTRIGINE 25 MG: 25 TABLET ORAL at 21:16

## 2018-10-27 RX ADMIN — ACETAMINOPHEN 650 MG: 325 TABLET ORAL at 18:01

## 2018-10-27 NOTE — PROGRESS NOTES
No yelling  Denies current suicidal ideation  Pleasant with staff  Positive with evening medications and HS snack  Maintained on Q 7 minute safety checks  No acting out, suicidal ideations, and/or homicidal behaviors  No changes in medical condition  Fluids maintained at bedside to promote hydration

## 2018-10-27 NOTE — PROGRESS NOTES
Pt is visible on unit  Pacing halls as coping skill  Pt actively hallucinating throughout the shift  Mood is labile  Pt observed yelling from room screaming at them "to just leave me alone and stop following me" Paranoid  Distraught at times  Pt responds well to decreased stimulation  Offered patient quiet room and spoke at length with patient  Pt aware that the woman that continues to belittle her is a hallucination  Pt asked if this RN could hear her  Reoriented patient  Pt redirectable  Pt required PRN Risperdal  Pt refusing all other PRN medications and states, "risperdal is the only medication that works" Pt attended groups throughout the day but did end up leaving them if she became agitated  Pt cooperative with staff and easily redirectable  Safety precautions maintained  Will continue to monitor and assess

## 2018-10-27 NOTE — PROGRESS NOTES
Progress Note - Behavioral Health   Crista Garza 53 y o  female MRN: [id number]  Unit[facility] Encounter: [id number]    Assessment/Plan   Principal Problem:    Schizoaffective disorder, bipolar type ([facility] )  Active Problems:    Uncomplicated alcohol dependence ([facility] )    LYNN (generalized anxiety disorder)    Post-traumatic stress disorder, chronic    URI (upper respiratory infection)   Patient was seen and discussed with the nursing staff  On examination today, the patient is reporting that she is still having breakthrough symptoms of agitation verbal aggression and acting out  She is also responding to internal stimuli  He has been receiving p r n  Risperdal   We discussed options and we are going to increase the Risperdal to a total dose from 4 mg a day to 5 mg a day  We will continue to monitor patient here in the hospital and monitor response to her verbal aggression and psychosis  Behavior over the last 24 hours:  regressed  Sleep: normal  Appetite: normal  Medication side effects: No  ROS: no complaints    Mental Status Evaluation:  Appearance:  casually dressed and disheveled   Behavior:  psychomotor agitation   Speech:  pressured   Mood:  labile   Affect:  labile   Thought Process:  disorganized, flight of ideas and loose associations   Thought Content:  delusions  obsessive/rumination and obsessions   Perceptual Disturbances: Auditory hallucinations without commands   Risk Potential: Potential for Aggression Yes Verbal aggression   Sensorium:  person, place and time/date   Cognition:  grossly intact   Consciousness:  alert and awake    Attention: attention span appeared shorter than expected for age   Insight:  age appropriate and poor   Judgment: poor   Gait/Station: normal gait/station   Motor Activity: no abnormal movements     Progress Toward Goals: No change    Recommended Treatment: Continue with group therapy, milieu therapy and occupational therapy        Risks, benefits and possible side effects of Medications:   Risks, benefits, and possible side effects of medications explained to patient and patient verbalizes understanding  Medications: planned medication changes: Increase Risperdal     Labs: Reviewed    Counseling / Coordination of Care  Total floor / unit time spent today 25 minutes  Greater than 50% of total time was spent with the patient and / or family counseling and / or coordination of care   A description of the counseling / coordination of care: 15

## 2018-10-27 NOTE — PROGRESS NOTES
No yelling noted  No current aggression noted  Patient observed sleeping during most Q 7 minute safety checks (second portion of shift)  No suicidal ideations, acting out or homicidal behaviors  No change in medical condition or complaints voiced  Fluids maintained at bedside to promote hydration

## 2018-10-27 NOTE — PLAN OF CARE
Alteration in Thoughts and Perception     Treatment Goal: Gain control of psychotic behaviors/thinking, reduce/eliminate presenting symptoms and demonstrate improved reality functioning upon discharge Progressing     Refrain from acting on delusional thinking/internal stimuli Progressing     Agree to be compliant with medication regime, as prescribed and report medication side effects Progressing     Complete daily ADLs, including personal hygiene independently, as able Progressing        Anxiety     Anxiety is at manageable level Progressing        Depression     Verbalize thoughts and feelings Progressing     Refrain from isolation Progressing        Ineffective Coping     Participates in unit activities Progressing        Risk for Self Injury/Neglect     Refrain from harming self Progressing        Risk for Violence/Aggression Toward Others     Refrain from harming others Progressing     Identify appropriate positive anger management techniques Progressing        SLEEP DISTURBANCE     Will exhibit normal sleeping pattern Progressing

## 2018-10-28 PROCEDURE — 99232 SBSQ HOSP IP/OBS MODERATE 35: CPT | Performed by: NURSE PRACTITIONER

## 2018-10-28 RX ORDER — NICOTINE 21 MG/24HR
1 PATCH, TRANSDERMAL 24 HOURS TRANSDERMAL DAILY
Status: DISCONTINUED | OUTPATIENT
Start: 2018-10-28 | End: 2018-10-30 | Stop reason: HOSPADM

## 2018-10-28 RX ORDER — RISPERIDONE 1 MG/1
1 TABLET, FILM COATED ORAL 3 TIMES DAILY
Status: DISCONTINUED | OUTPATIENT
Start: 2018-10-28 | End: 2018-10-30 | Stop reason: HOSPADM

## 2018-10-28 RX ORDER — RISPERIDONE 2 MG/1
4 TABLET, FILM COATED ORAL
Status: DISCONTINUED | OUTPATIENT
Start: 2018-10-28 | End: 2018-10-30 | Stop reason: HOSPADM

## 2018-10-28 RX ADMIN — RISPERIDONE 4 MG: 2 TABLET ORAL at 22:04

## 2018-10-28 RX ADMIN — RISPERIDONE 1 MG: 1 TABLET ORAL at 16:51

## 2018-10-28 RX ADMIN — GABAPENTIN 400 MG: 100 CAPSULE ORAL at 08:41

## 2018-10-28 RX ADMIN — FLUTICASONE PROPIONATE 1 SPRAY: 50 SPRAY, METERED NASAL at 08:46

## 2018-10-28 RX ADMIN — ALBUTEROL SULFATE 2 PUFF: 90 AEROSOL, METERED RESPIRATORY (INHALATION) at 06:04

## 2018-10-28 RX ADMIN — LORAZEPAM 0.5 MG: 0.5 TABLET ORAL at 08:42

## 2018-10-28 RX ADMIN — GABAPENTIN 400 MG: 100 CAPSULE ORAL at 11:54

## 2018-10-28 RX ADMIN — RISPERIDONE 1 MG: 1 TABLET ORAL at 08:41

## 2018-10-28 RX ADMIN — RISPERIDONE 1 MG: 1 TABLET ORAL at 14:00

## 2018-10-28 RX ADMIN — FOLIC ACID 1 MG: 1 TABLET ORAL at 08:42

## 2018-10-28 RX ADMIN — LORAZEPAM 0.5 MG: 0.5 TABLET ORAL at 22:04

## 2018-10-28 RX ADMIN — Medication 100 MG: at 08:41

## 2018-10-28 RX ADMIN — MAGNESIUM HYDROXIDE 30 ML: 400 SUSPENSION ORAL at 04:13

## 2018-10-28 RX ADMIN — HYDROCORTISONE: 25 CREAM TOPICAL at 06:05

## 2018-10-28 RX ADMIN — NICOTINE 1 PATCH: 21 PATCH, EXTENDED RELEASE TRANSDERMAL at 08:40

## 2018-10-28 RX ADMIN — GABAPENTIN 400 MG: 100 CAPSULE ORAL at 16:52

## 2018-10-28 RX ADMIN — BENZTROPINE MESYLATE 1 MG: 1 TABLET ORAL at 08:42

## 2018-10-28 RX ADMIN — Medication 1 TABLET: at 08:42

## 2018-10-28 RX ADMIN — HYDROXYZINE HYDROCHLORIDE 25 MG: 25 TABLET ORAL at 15:15

## 2018-10-28 RX ADMIN — GABAPENTIN 400 MG: 100 CAPSULE ORAL at 22:03

## 2018-10-28 RX ADMIN — BENZTROPINE MESYLATE 1 MG: 1 TABLET ORAL at 22:03

## 2018-10-28 RX ADMIN — HYDROXYZINE HYDROCHLORIDE 25 MG: 25 TABLET ORAL at 08:42

## 2018-10-28 RX ADMIN — HYDROXYZINE HYDROCHLORIDE 25 MG: 25 TABLET ORAL at 22:04

## 2018-10-28 RX ADMIN — BENZTROPINE MESYLATE 1 MG: 1 TABLET ORAL at 15:14

## 2018-10-28 RX ADMIN — LAMOTRIGINE 25 MG: 25 TABLET ORAL at 22:03

## 2018-10-28 RX ADMIN — RISPERIDONE 1 MG: 1 TABLET, ORALLY DISINTEGRATING ORAL at 11:54

## 2018-10-28 NOTE — PLAN OF CARE
DISCHARGE PLANNING - CARE MANAGEMENT     Discharge to post-acute care or home with appropriate resources Not Progressing          Alteration in Thoughts and Perception     Treatment Goal: Gain control of psychotic behaviors/thinking, reduce/eliminate presenting symptoms and demonstrate improved reality functioning upon discharge Progressing     Refrain from acting on delusional thinking/internal stimuli Progressing     Agree to be compliant with medication regime, as prescribed and report medication side effects Progressing     Complete daily ADLs, including personal hygiene independently, as able Progressing        Anxiety     Anxiety is at manageable level Progressing        Depression     Verbalize thoughts and feelings Progressing     Refrain from isolation Progressing        Ineffective Coping     Participates in unit activities Progressing        Risk for Self Injury/Neglect     Refrain from harming self Progressing        Risk for Violence/Aggression Toward Others     Refrain from harming others Progressing     Identify appropriate positive anger management techniques Progressing        SLEEP DISTURBANCE     Will exhibit normal sleeping pattern Progressing

## 2018-10-28 NOTE — PROGRESS NOTES
Labile mood persists  Became upset, yelling at he mom on the phone  Requested prn risperdal M tab at [phone] for agitation and racing thoughts  Paced the halls talking to herself  Risperdal scheduled dose adjusted times as she requested to practitioner  Much calmer as the afternoon went on  No suicidal or homicidal thoughts expressed

## 2018-10-28 NOTE — PROGRESS NOTES
Patient currently resting in bed  No acting out or suicidal ideations  Complaint with med  No group attending  Monitored on q 7 minute safety checks
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
## 2018-10-28 NOTE — PROGRESS NOTES
Progress Note - Behavioral Health   Agnieszka Murrell 53 y o  female MRN: [id number]  Unit/Bed#: [facility] 247-01 Encounter: [id number]    Assessment/Plan   Principal Problem:    Schizoaffective disorder, bipolar type ([facility] )  Active Problems:    Uncomplicated alcohol dependence ([facility] )    LYNN (generalized anxiety disorder)    Post-traumatic stress disorder, chronic    URI (upper respiratory infection)   This is a this progress note on patient  Patient was seen and discussed with the nursing staff  On examination today, the patient is reporting continued outbursts related to auditory hallucinations  She also reports racing thoughts in her head that she can't control  She likes the Lamictal but again, I cautioned her that we need to increase that dose slowly  She is agreeing to an increase of Risperdal taken to the max dose of 7 mg daily  She likes a divided so will do 3 mg during the day and 4 mg at night  As soon as we can increase the Lamictal which will not be for another 10-11 days, we will get her up to 50 mg and titrate from there  She continues to require inpatient care and treatment and from what I understand, according to her treatment team, they are looking for a longer term placement in the form of any [facility] unit  Behavior over the last 24 hours:  unchanged  Sleep: normal  Appetite: normal  Medication side effects: No  ROS: no complaints    Mental Status Evaluation:  Appearance:  casually dressed and disheveled   Behavior:  psychomotor agitation   Speech:  pressured   Mood:  irritable and labile   Affect:  constricted   Thought Process:  disorganized, flight of ideas, illogical, loose associations and perserverative   Thought Content:  delusions  obsessive/rumination   Perceptual Disturbances:  Auditory hallucinations without commands   Risk Potential: Potential for Aggression Yes verbally aggressive at times   Sensorium:  person, place and time/date   Cognition:  grossly intact   Consciousness:  alert and awake Attention: attention span and concentration were age appropriate   Insight:  Poor   Judgment: Poor   Gait/Station: normal gait/station   Motor Activity: no abnormal movements     Progress Toward Goals: Remains labile and psychotic    Recommended Treatment: Continue with group therapy, milieu therapy and occupational therapy  Risks, benefits and possible side effects of Medications:   Risks, benefits, and possible side effects of medications explained to patient and patient verbalizes understanding  Medications: planned medication changes: Increase Risperdal     Labs: Reviewed    Counseling / Coordination of Care  Total floor / unit time spent today 25 minutes  Greater than 50% of total time was spent with the patient and / or family counseling and / or coordination of care   A description of the counseling / coordination of care: 15

## 2018-10-28 NOTE — PROGRESS NOTES
Yelling in bedroom to unseen person  When ask if anything was wrong she stated, "I'm tired of her cutting my hair "  Screaming with periods of quietness  Agreed to come to staff if needed  State she is safe at present time

## 2018-10-28 NOTE — PROGRESS NOTES
Broken sleep noted  No yelling out noted  Denies active hallucinations tonight  Controlled  Complaint with medication  Took MOM 30 cc due to complaints of constipation  Maintained on q 7 minute checks

## 2018-10-29 PROCEDURE — 99231 SBSQ HOSP IP/OBS SF/LOW 25: CPT | Performed by: NURSE PRACTITIONER

## 2018-10-29 RX ADMIN — HYDROXYZINE HYDROCHLORIDE 25 MG: 25 TABLET ORAL at 06:12

## 2018-10-29 RX ADMIN — RISPERIDONE 4 MG: 2 TABLET ORAL at 20:12

## 2018-10-29 RX ADMIN — BENZTROPINE MESYLATE 1 MG: 1 TABLET ORAL at 08:05

## 2018-10-29 RX ADMIN — RISPERIDONE 1 MG: 1 TABLET ORAL at 12:35

## 2018-10-29 RX ADMIN — LORAZEPAM 0.5 MG: 0.5 TABLET ORAL at 20:12

## 2018-10-29 RX ADMIN — RISPERIDONE 1 MG: 1 TABLET ORAL at 08:04

## 2018-10-29 RX ADMIN — BENZTROPINE MESYLATE 1 MG: 1 TABLET ORAL at 20:12

## 2018-10-29 RX ADMIN — HYDROXYZINE HYDROCHLORIDE 25 MG: 25 TABLET ORAL at 20:12

## 2018-10-29 RX ADMIN — ALBUTEROL SULFATE 2 PUFF: 90 AEROSOL, METERED RESPIRATORY (INHALATION) at 06:15

## 2018-10-29 RX ADMIN — GABAPENTIN 400 MG: 100 CAPSULE ORAL at 12:35

## 2018-10-29 RX ADMIN — HYDROXYZINE HYDROCHLORIDE 25 MG: 25 TABLET ORAL at 16:42

## 2018-10-29 RX ADMIN — LAMOTRIGINE 25 MG: 25 TABLET ORAL at 20:12

## 2018-10-29 RX ADMIN — LORAZEPAM 0.5 MG: 0.5 TABLET ORAL at 08:05

## 2018-10-29 RX ADMIN — FLUTICASONE PROPIONATE 1 SPRAY: 50 SPRAY, METERED NASAL at 08:10

## 2018-10-29 RX ADMIN — GABAPENTIN 400 MG: 100 CAPSULE ORAL at 20:13

## 2018-10-29 RX ADMIN — HYDROXYZINE HYDROCHLORIDE 25 MG: 25 TABLET ORAL at 08:05

## 2018-10-29 RX ADMIN — HYDROCORTISONE: 25 CREAM TOPICAL at 17:37

## 2018-10-29 RX ADMIN — RISPERIDONE 1 MG: 1 TABLET ORAL at 16:42

## 2018-10-29 RX ADMIN — NICOTINE 1 PATCH: 14 PATCH, EXTENDED RELEASE TRANSDERMAL at 06:14

## 2018-10-29 RX ADMIN — Medication 1 TABLET: at 08:04

## 2018-10-29 RX ADMIN — GABAPENTIN 400 MG: 100 CAPSULE ORAL at 08:04

## 2018-10-29 RX ADMIN — GABAPENTIN 400 MG: 100 CAPSULE ORAL at 17:27

## 2018-10-29 RX ADMIN — FOLIC ACID 1 MG: 1 TABLET ORAL at 08:05

## 2018-10-29 RX ADMIN — BENZTROPINE MESYLATE 1 MG: 1 TABLET ORAL at 16:42

## 2018-10-29 RX ADMIN — Medication 100 MG: at 08:04

## 2018-10-29 NOTE — PROGRESS NOTES
Progress Note - Behavioral Health     Chester Nava 53 y o  female MRN: [id number]   Unit/Bed#: [facility] 247-01 Encounter: [id number]    Behavior over the last 24 hours:  Sylvia Bowers was seen for an inpatient follow-up psychiatric visit this date  She denies any suicidal or homicidal ideation as well as any auditory of visual hallucinations during today's assessment     She appears to be tolerating the recent increase in risperidone without difficulty  She feels it is helping stabilize her mood and decreases symptoms of psychosis  Per nursing report, she has been interacting appropriately with staff and peers  Appetite and sleep are within normal limits  No paranoia or agitation noted at today's visit  ROS: no complaints    Mental Status Evaluation:    Appearance:  casually dressed, dressed appropriately   Behavior:  pleasant, cooperative   Speech:  normal rate and volume   Mood:  normal   Affect:  normal range and intensity   Thought Process:  organized, logical, coherent   Associations: intact associations   Thought Content:  normal   Perceptual Disturbances: denies auditory hallucinations when asked   Risk Potential: Suicidal ideation - None  Homicidal ideation - None  Potential for aggression - No   Sensorium:  oriented to person, place, time/date and situation   Memory:  recent and remote memory grossly intact   Consciousness:  alert and awake   Attention: Decreased attention span and concentration   Insight:  limited   Judgment: limited   Gait/Station: normal gait/station and normal balance   Motor Activity: no abnormal movements     Vital signs in last 24 hours:    Temp:  [97 2 °F (36 2 °C)-98 2 °F (36 8 °C)] 97 2 °F (36 2 °C)  HR:  [62-88] 62  Resp:  [16-18] 18  BP: ()/(58-67) 95/58    Laboratory results:  I have personally reviewed all pertinent laboratory/tests results      Progress Toward Goals: progressing    Assessment/Plan   Principal Problem:    Schizoaffective disorder, [facility] ([facility] )  Active Problems:    Uncomplicated alcohol dependence ([facility])    LYNN (generalized anxiety disorder)    Post-traumatic stress disorder, chronic    URI (upper respiratory infection)    Recommended Treatment:   1  Will continue current psychiatric medications as prescribed  2  Will continue to monitor patient and adjust medications as needed  3  Discharge disposition and planning are ongoing  All current active medications have been reviewed    Encourage group therapy, milieu therapy and occupational therapy  [address] checks every 5 minutes      Current Facility-Administered Medications:  acetaminophen 650 mg Oral Q6H PRN Isis Lama, CRNP   acetaminophen 650 mg Oral Q4H PRN Isis Porterics, CRNP   acetaminophen 975 mg Oral Q6H PRN Isis Lama, CRNP   albuterol 2 puff Inhalation Q4H PRN Karyle Mills, MD   aluminum-magnesium hydroxide-simethicone 30 mL Oral Q4H PRN Isis Lama, JARED   benztropine 1 mg Intramuscular Q6H PRN Meliza Lilly MD   benztropine 1 mg Oral TID Liz Nettles MD   benztropine 1 mg Oral Q6H PRN Meliza Lilly MD   fluticasone 1 spray Each Nare Daily Karyle Mills, MD   folic acid 1 mg Oral Daily Meliza Lilly MD   gabapentin 400 mg Oral 4x Daily Liz Nettles MD   haloperidol 5 mg Oral Q6H PRN Meliza Lilly MD   haloperidol lactate 5 mg Intramuscular Q6H PRN Meliza Lilly MD   hydrocortisone  Rectal 4x Daily PRN Karyle Mills, MD   hydrOXYzine HCL 25 mg Oral Q6H PRN Isis Lama, JARED   hydrOXYzine HCL 25 mg Oral TID Meliza Lilly MD   influenza vaccine 0 5 mL Intramuscular Prior to discharge JARED Hull   lamoTRIgine 25 mg Oral Daily Isis Lama, JARED   LORazepam 1 mg Intramuscular Q6H PRN Melzia Lilly MD   LORazepam 0 5 mg Oral BID JARED Augustin   LORazepam 1 mg Oral Q6H PRN Meliza Lilly MD   magnesium hydroxide 30 mL Oral Daily PRN Annika Lama, JARED   multivitamin-minerals 1 tablet Oral Daily Gisella Grimaldo MD   nicotine 1 patch Transdermal Daily JARED Augustin   OLANZapine 10 mg Intramuscular Q3H PRN Gisella Grimaldo MD   risperiDONE 1 mg Oral TID JARED Augustin   risperiDONE 4 mg Oral HS JARED Augustin   thiamine 100 mg Oral Daily Gisella Grimaldo MD   traZODone 50 mg Oral HS PRN JARED Jones       Risks / Benefits of Treatment:    Risks, benefits, and possible side effects of medications explained to patient and patient verbalizes understanding and agreement for treatment  Counseling / Coordination of Care:      Patient's progress discussed with staff in treatment team meeting  Medications, treatment progress and treatment plan reviewed with patient

## 2018-10-29 NOTE — PLAN OF CARE
Alteration in Thoughts and Perception     Treatment Goal: Gain control of psychotic behaviors/thinking, reduce/eliminate presenting symptoms and demonstrate improved reality functioning upon discharge Completed     Agree to be compliant with medication regime, as prescribed and report medication side effects Completed     Complete daily ADLs, including personal hygiene independently, as able Completed        Risk for Self Injury/Neglect     Refrain from harming self Completed        Risk for Violence/Aggression Toward Others     Refrain from harming others Completed     Identify appropriate positive anger management techniques Completed          Pt has progressed to the point of completing the above listed goals    Alteration in Thoughts and Perception     Refrain from acting on delusional thinking/internal stimuli Progressing        Anxiety     Anxiety is at manageable level Progressing        Depression     Verbalize thoughts and feelings Progressing     Refrain from isolation Progressing        Ineffective Coping     Participates in unit activities Progressing        SLEEP DISTURBANCE     Will exhibit normal sleeping pattern Progressing          Pt continues to work on above listed goals   Nursing encouraged patient to continue reporting needs to staff and remain active in the community

## 2018-10-29 NOTE — PROGRESS NOTES
No yelling out or overt aggression noted  Patient observed sleeping during most Q 7 minute safety checks (second portion of shift)  Delusional and paranoid ideations remain  No suicidal ideations, acting out or homicidal behaviors  No change in medical condition or complaints voiced  Fluids maintained at bedside to promote hydration

## 2018-10-29 NOTE — PLAN OF CARE
Problem: DISCHARGE PLANNING - CARE MANAGEMENT  Goal: Discharge to post-acute care or home with appropriate resources  INTERVENTIONS:  - Conduct assessment to determine patient/family and health care team treatment goals, and need for post-acute services based on payer coverage, community resources, and patient preferences, and barriers to discharge  - Address psychosocial, clinical, and financial barriers to discharge as identified in assessment in conjunction with the patient/family and health care team  - Arrange appropriate level of post-acute services according to patient's   needs and preference and payer coverage in collaboration with the physician and health care team  - Communicate with and update the patient/family, physician, and health care team regarding progress on the discharge plan  - Arrange appropriate transportation to post-acute venues   Outcome: Progressing  ACT would support EAC recommendation; Adrianna Reynoso will be further discussed by the team

## 2018-10-29 NOTE — PROGRESS NOTES
No yelling noted this first half of shift  Controlled  States she is felling tired on the medication she is on  Remains paranoid  Less delusional thinking noted  Maintained on q 7 minute safety checks  Eating and drinking well  Compliant with medications

## 2018-10-29 NOTE — CASE MANAGEMENT
Pt informed me that she has an upcomming court date  At her request I called the [employer] office and requested the forms necessary to ask for a continuance  They were filled out and faxed in  We were notified that her continuance was denied  Harris is now stating that she does not want to go to [facility] and she needs to be discharged to get prepared for court  She is worried she will lose her social security benefit

## 2018-10-29 NOTE — PROGRESS NOTES
Bright and pleasant this am  Denies acharya and SI  Does report hallucinations over the weekend however  Reports new med regimen is effective  Pt shows good insight when discussing d/c plans of possible EAC referral denies any other good options at this time   Agrees to make needs known

## 2018-10-29 NOTE — CASE MANAGEMENT
I spoke with an act team member for this patient  Her name was yasmine and her phone number is [phone]  She stated that even at her baseline shaina is "sick"  However, she reported that this admission is different because it is the first time she has stated she is suicidal for admission according to her and her teams recollection  She states that she is generally homeless because she will find a place to live and get kicked out due to her yelling or screaming at her hallucinations  She reported that during their meeting Maury Francisco told her that there were ghosts in the clouds getting ready to come for her  Lornalucie Mckeon stated that Maury Francisco will generally not mention her hallucinations because they are real for her and she thinks it is normal   Lorna Mckeon stated that they did not initiate the discussion of EAC for this patient but that they would support it  Maury Francisco has been in a group home in the past and did well for a month but her alcohol abuse derailed her  I told her that I was not sure if we are going to request EAC but that we would be in contact

## 2018-10-30 VITALS
DIASTOLIC BLOOD PRESSURE: 70 MMHG | HEART RATE: 65 BPM | SYSTOLIC BLOOD PRESSURE: 123 MMHG | TEMPERATURE: 97.3 F | HEIGHT: 61 IN | OXYGEN SATURATION: 95 % | RESPIRATION RATE: 18 BRPM | WEIGHT: 116 LBS | BODY MASS INDEX: 21.9 KG/M2

## 2018-10-30 PROCEDURE — 90686 IIV4 VACC NO PRSV 0.5 ML IM: CPT | Performed by: NURSE PRACTITIONER

## 2018-10-30 PROCEDURE — 99239 HOSP IP/OBS DSCHRG MGMT >30: CPT | Performed by: NURSE PRACTITIONER

## 2018-10-30 RX ORDER — RISPERIDONE 1 MG/1
1 TABLET, FILM COATED ORAL 3 TIMES DAILY
Qty: 90 TABLET | Refills: 0 | Status: SHIPPED | OUTPATIENT
Start: 2018-10-30 | End: 2019-02-04 | Stop reason: HOSPADM

## 2018-10-30 RX ORDER — LAMOTRIGINE 25 MG/1
25 TABLET ORAL DAILY
Qty: 30 TABLET | Refills: 0 | Status: SHIPPED | OUTPATIENT
Start: 2018-10-30 | End: 2018-12-25 | Stop reason: ALTCHOICE

## 2018-10-30 RX ORDER — GABAPENTIN 400 MG/1
400 CAPSULE ORAL 4 TIMES DAILY
Qty: 120 CAPSULE | Refills: 0 | Status: SHIPPED | OUTPATIENT
Start: 2018-10-30 | End: 2019-02-04 | Stop reason: HOSPADM

## 2018-10-30 RX ORDER — HYDROXYZINE HYDROCHLORIDE 25 MG/1
25 TABLET, FILM COATED ORAL 3 TIMES DAILY
Qty: 30 TABLET | Refills: 0 | Status: SHIPPED | OUTPATIENT
Start: 2018-10-30 | End: 2019-02-04 | Stop reason: HOSPADM

## 2018-10-30 RX ORDER — RISPERIDONE 4 MG/1
4 TABLET, FILM COATED ORAL
Qty: 30 TABLET | Refills: 0 | Status: ON HOLD | OUTPATIENT
Start: 2018-10-30 | End: 2019-01-27 | Stop reason: CLARIF

## 2018-10-30 RX ORDER — BENZTROPINE MESYLATE 1 MG/1
1 TABLET ORAL 3 TIMES DAILY
Qty: 90 TABLET | Refills: 0 | Status: SHIPPED | OUTPATIENT
Start: 2018-10-30 | End: 2019-02-04 | Stop reason: HOSPADM

## 2018-10-30 RX ADMIN — Medication 100 MG: at 07:55

## 2018-10-30 RX ADMIN — ACETAMINOPHEN 975 MG: 325 TABLET ORAL at 03:00

## 2018-10-30 RX ADMIN — HYDROCORTISONE: 25 CREAM TOPICAL at 10:51

## 2018-10-30 RX ADMIN — BENZTROPINE MESYLATE 1 MG: 1 TABLET ORAL at 07:56

## 2018-10-30 RX ADMIN — GABAPENTIN 400 MG: 100 CAPSULE ORAL at 12:22

## 2018-10-30 RX ADMIN — Medication 1 TABLET: at 07:56

## 2018-10-30 RX ADMIN — HYDROXYZINE HYDROCHLORIDE 25 MG: 25 TABLET ORAL at 06:13

## 2018-10-30 RX ADMIN — FOLIC ACID 1 MG: 1 TABLET ORAL at 07:56

## 2018-10-30 RX ADMIN — LORAZEPAM 0.5 MG: 0.5 TABLET ORAL at 07:56

## 2018-10-30 RX ADMIN — RISPERIDONE 1 MG: 1 TABLET ORAL at 07:56

## 2018-10-30 RX ADMIN — RISPERIDONE 1 MG: 1 TABLET ORAL at 12:22

## 2018-10-30 RX ADMIN — FLUTICASONE PROPIONATE 1 SPRAY: 50 SPRAY, METERED NASAL at 08:01

## 2018-10-30 RX ADMIN — INFLUENZA VIRUS VACCINE 0.5 ML: 15; 15; 15; 15 SUSPENSION INTRAMUSCULAR at 11:16

## 2018-10-30 RX ADMIN — ALBUTEROL SULFATE 2 PUFF: 90 AEROSOL, METERED RESPIRATORY (INHALATION) at 10:51

## 2018-10-30 RX ADMIN — HYDROXYZINE HYDROCHLORIDE 25 MG: 25 TABLET ORAL at 07:56

## 2018-10-30 RX ADMIN — NICOTINE 1 PATCH: 14 PATCH, EXTENDED RELEASE TRANSDERMAL at 07:57

## 2018-10-30 RX ADMIN — GABAPENTIN 400 MG: 100 CAPSULE ORAL at 07:56

## 2018-10-30 NOTE — SOCIAL WORK
SW left message for Ej Cheney the substance abuse specialist on pt's [facility] team informing her of pt's discharge today  SW requested a return phone call  SW received return phone call from Ej Cheney from [facility]  Ej Cheney questioned why pt is suddenly leaving and SW explained situation  Ej Cheney asked about EAC and SW explained that pt no longer wants EAC and that our team never agreed that she needs ACT  Ej Cheney asked SW to fax scripts over; scripts faxed as requested  Ej Cheney informed SW that she will follow-up with pt on Monday and that Colletta Morgans her RN will see her on Tuesday; they will call her directly to schedule  No other needs expressed

## 2018-10-30 NOTE — DISCHARGE INSTR - APPOINTMENTS
The treatment team recommends inpatient substance abuse treatment and continued services with your ACT team   Katerina Bello declined a referral for inpatient substance abuse treatment; however, you have a substance abuse specialist on your ACT team     Please continue with ACT services with [facility] ([address], [place] [address] Phone: [phone] Fax: [phone]) for ongoing mental health and substance abuse treatment  Vivien Dickinson, your substance abuse specialist will follow-up with you on Monday November 5, 2018; she will call you to schedule a time  Yessenia Penaloza, your ACT nurse will follow-up with you on Tuesday November 6, 2018; he will call you to schedule a time  Your summary of care will be faxed to this provider  You identified your PCP as Dr Martin Dec  You indicated that you do not need an appointment made on your behalf and declined to sign a release of information; therefore, your summary of care will not be faxed

## 2018-10-30 NOTE — PROGRESS NOTES
Patient received Flu shot on 10/30/18 at 1120 in R delt  Patient tolerated well  No signs or symptoms of distress

## 2018-10-30 NOTE — PLAN OF CARE
Alteration in Thoughts and Perception     Refrain from acting on delusional thinking/internal stimuli Completed        Depression     Verbalize thoughts and feelings Completed     Refrain from isolation Completed        Ineffective Coping     Participates in unit activities Completed        SLEEP DISTURBANCE     Will exhibit normal sleeping pattern Completed          Anxiety     Anxiety is at manageable level Progressing 0

## 2018-10-30 NOTE — PROGRESS NOTES
Pt  Observed sleeping comfortably for the majority of the night with no apparent distress  Q 7 minute checks in place  Will continue to monitor

## 2018-10-30 NOTE — DISCHARGE INSTRUCTIONS
Influenza Virus Vaccine (By injection)   Influenza Virus Vaccine (in-floo-EN-za VYE-rosy VAX-een)  Helps prevent infection with influenza (flu) virus  Brand Name(s): Afluria 2600-7555 Formula, Eduardo Hassan 7330-7039 Formula, FluLaval Quadrivalent 5178-9333 Formula, Fluad 0016-8168 Formula, Fluarix Quadrivalent 5422-1383 Formula, Flublok 0034-9919 Formula, Flucelvax Quadrivalent 3922-7889 Formula, Fluvirin 4509-4472 Formula, Fluzone High-Dose 1171-4026 Formula, Fluzone Intradermal Quadrivalent 5233-6226 Formula, Fluzone Quadrivalent 4978-6178 Formula, Medical Provider Single Use EZ Flu Shot 8009-8205, Medical Provider Single Use EZ Flu Shot 8568-9253   There may be other brand names for this medicine  When This Medicine Should Not Be Used: This vaccine is not right for everyone  You should not receive it if you had an allergic reaction to flu vaccine  If you are allergic to eggs, tell the caregiver who is going to give you the injection  Some brands of this vaccine contain egg proteins and could cause an allergic reaction  How to Use This Medicine:   Injectable  · The vaccine is given as a shot into a muscle or into your skin, usually in the shoulder area  The shot could be given in the thigh for babies and young children  · A nurse or other health provider will give you this medicine  · Read and follow the patient instructions that come with this medicine  Talk to your doctor or pharmacist if you have any questions  · A child who is younger than 5years old and who has not had a flu shot before may need 2 shots  The second shot should be given about 1 month after the first   · Missed dose: Most people need only 1 dose of the vaccine  If your child needs a second dose, it is important for the vaccine to be given on schedule  If you must cancel an appointment, make a new one right away    Drugs and Foods to Avoid:   Ask your doctor or pharmacist before using any other medicine, including over-the-counter medicines, vitamins, and herbal products  · Tell your doctor if you are using a medicine or treatment that weakens your immune system, such as a steroid, radiation, or cancer treatment  This vaccine may not work as well if you are also using these medicines  However, your doctor may still want you to get the vaccine because it can give you some protection  Warnings While Using This Medicine:   · Tell your doctor if you are pregnant or breastfeeding or if you have a weak immune system  · Tell your doctor if you ever had an unusual reaction to a flu shot, such as Guillain-Barré syndrome, or if you are allergic to latex  · The flu vaccine may not protect everyone who receives it  This vaccine will not treat flu symptoms if you have already been infected with the virus  Possible Side Effects While Using This Medicine:   Call your doctor right away if you notice any of these side effects:  · Allergic reaction: Itching or hives, swelling in your face or hands, swelling or tingling in your mouth or throat, chest tightness, trouble breathing  · Fainting, dizziness, or lightheadedness  · Fever over 103 degrees F  · Seizures  · Severe muscle weakness  If you notice these less serious side effects, talk with your doctor:   · Headache, muscle pain, tiredness  · Irritability or crying (in a child)  · Redness, pain, swelling, soreness, or a lump where the shot was given  If you notice other side effects that you think are caused by this medicine, tell your doctor  Call your doctor for medical advice about side effects  You may report side effects to FDA at 7-369-FDA-6734  © 2017 [address]  Information is for End User's use only and may not be sold, redistributed or otherwise used for commercial purposes  The above information is an  only  It is not intended as medical advice for individual conditions or treatments   Talk to your doctor, nurse or pharmacist before following any medical regimen to see if it is safe and effective for you  Benztropine (By injection)   Benztropine (KPNK-vber-twvj)  Treats symptoms of Parkinson disease or side effects of other drugs  Brand Name(s): Cogentin   There may be other brand names for this medicine  When This Medicine Should Not Be Used: This medicine is not right for everyone  Do not receive it if you had an allergic reaction to benztropine  How to Use This Medicine:   Injectable  · A nurse or other health provider will give you this medicine  Your doctor will prescribe your exact dose, and tell you how often it will be given  This medicine is given as a shot into one of your muscles, or into a vein  · Missed dose: Call your doctor, pharmacist, or home caregiver for instructions  Drugs and Foods to Avoid:   Ask your doctor or pharmacist before using any other medicine, including over-the-counter medicines, vitamins, and herbal products  · Some foods and medicines may affect how benztropine works  Tell your doctor if you are taking any of the following:   ¨ Haloperidol  ¨ A phenothiazine medicine, such as prochlorperazine, chlorpromazine, perphenazine, promethazine, thioridazine  ¨ A tricyclic antidepressant medicine, such as amitriptyline, doxepin, nortriptyline  · Do not drink alcohol while you are using this medicine  Warnings While Using This Medicine:   · Tell your doctor if you are pregnant or breastfeeding, or if you have glaucoma, bowel or stomach problems, an enlarged prostate, heart disease, or heart rhythm problems  Tell your doctor if you have a history of mental problems, or if you have a disease that affects your nervous system  Tell your doctor if you have jerky muscle movements caused by another medicine  · This medicine may keep you from sweating enough, which may cause your body to get too hot  Be careful in hot weather, and while you exercise or use a sauna or whirlpool    · This medicine may make you drowsy or cause you to have trouble thinking clearly  Do not drive or do anything that could be dangerous until you know how this medicine affects you  · Tell your doctor if your neck muscles become stiff and suddenly weak  Your doctor may need to lower the dose you are taking  · Your doctor will check your progress and the effects of this medicine at regular visits  Keep all appointments  Possible Side Effects While Using This Medicine:   Call your doctor right away if you notice any of these side effects:  · Allergic reaction: Itching or hives, swelling in your face or hands, swelling or tingling in your mouth or throat, chest tightness, trouble breathing  · Confusion or extreme behavior changes  · Fast or uneven heartbeat  · Jerky muscle movements you cannot control (often in your face, tongue, or jaw)  · Seeing or hearing things that are not real  · Severe constipation, or pain in your abdomen  · Severe dry mouth that causes trouble swallowing or speaking, loss of appetite, or weight loss  · Unable to sweat, or feeling overheated  If you notice these less serious side effects, talk with your doctor:   · Blurred vision  · Nausea or vomiting  · Trouble urinating  If you notice other side effects that you think are caused by this medicine, tell your doctor  Call your doctor for medical advice about side effects  You may report side effects to FDA at 7-574-FDA-6834  © 2017 [address] Information is for End User's use only and may not be sold, redistributed or otherwise used for commercial purposes  The above information is an  only  It is not intended as medical advice for individual conditions or treatments  Talk to your doctor, nurse or pharmacist before following any medical regimen to see if it is safe and effective for you  Gabapentin (By mouth)   Gabapentin (gema-a-PEN-tin)  Treats seizures and pain caused by shingles     Brand Name(s): ACTIVE-PAC with Gabapentin, Convenience Madhu, Cyclo/Porfirio 10/300 Pack, DIRECTV, Porfirio-V, Gralise, Gralise Starter Pack, Neurontin, [employer] Kit   There may be other brand names for this medicine  When This Medicine Should Not Be Used: This medicine is not right for everyone  Do not use it if you had an allergic reaction to gabapentin  How to Use This Medicine:   Capsule, Liquid, Tablet  · Take your medicine as directed  Your dose may need to be changed several times to find what works best for you  If you have epilepsy, do not allow more than 12 hours to pass between doses  · Capsule: Swallow the capsule whole with plenty of water  Do not open, crush, or chew it  · Gralise® tablet: Swallow the tablet whole   Do not crush, break, or chew it  · Neurontin® tablet: If you break a tablet into 2 pieces, use the second half as your next dose  If you don't use it within 28 days, throw it away  · Measure the oral liquid medicine with a marked measuring spoon, oral syringe, or medicine cup  · This medicine should come with a Medication Guide  Ask your pharmacist for a copy if you do not have one  · Missed dose: Take a dose as soon as you remember  If it is almost time for your next dose, wait until then and take a regular dose  Do not take extra medicine to make up for a missed dose  · Store the medicine in a closed container at room temperature, away from heat, moisture, and direct light  Store the Neurontin® oral liquid in the refrigerator  Do not freeze  Drugs and Foods to Avoid:   Ask your doctor or pharmacist before using any other medicine, including over-the-counter medicines, vitamins, and herbal products  · Some medicines can affect how gabapentin works  Tell your doctor if you also use any of the following:   ¨ Hydrocodone  ¨ Morphine  · If you take an antacid, wait at least 2 hours before you take gabapentin  · Tell your doctor if you use anything else that makes you sleepy   Some examples are allergy medicine, narcotic pain medicine, and alcohol  Warnings While Using This Medicine:   · Tell your doctor if you are pregnant or breastfeeding, or if you have kidney problems or are receiving dialysis  Tell your doctor if you have a history of depression or mental health problems  · This medicine may increase depression or thoughts of suicide  Tell your doctor right away if you start to feel more depressed or think about hurting yourself  · This medicine may cause a serious allergic reaction called multiorgan hypersensitivity, which can damage organs and be life-threatening  · Do not stop using this medicine suddenly  Your doctor will need to slowly decrease your dose before you stop it completely  If you take this medicine to prevent seizures, your seizures may return or occur more often if you stop this medicine suddenly  · This medicine may make you dizzy or drowsy  Do not drive or do anything else that could be dangerous until you know how this medicine affects you  · Tell any doctor or dentist who treats you that you are using this medicine  This medicine may affect certain medical test results  · Your doctor will check your progress and the effects of this medicine at regular visits  Keep all appointments  · Keep all medicine out of the reach of children  Never share your medicine with anyone    Possible Side Effects While Using This Medicine:   Call your doctor right away if you notice any of these side effects:  · Allergic reaction: Itching or hives, swelling in your face or hands, swelling or tingling in your mouth or throat, chest tightness, trouble breathing  · Behavior problems, aggression, restlessness, trouble concentrating, moodiness (especially in children)  · Blistering, peeling, red skin rash  · Change in how much or how often you urinate, bloody or cloudy urine,  · Chest pain, fast heartbeat, trouble breathing  · Dark urine or pale stools, nausea, vomiting, loss of appetite, stomach pain, yellow skin or eyes  · Fever, rash, swollen or tender glands in the neck, armpit, or groin  · Problems with coordination, shakiness, unsteadiness  · Rapid weight gain, swelling in your hands, ankles, or feet  · Unusual moods or behaviors, thoughts of hurting yourself, feeling depressed  If you notice these less serious side effects, talk with your doctor:   · Dizziness, drowsiness, sleepiness, tiredness  If you notice other side effects that you think are caused by this medicine, tell your doctor  Call your doctor for medical advice about side effects  You may report side effects to FDA at 8-065-FDA-9930  © 2017 [address] Information is for End User's use only and may not be sold, redistributed or otherwise used for commercial purposes  The above information is an  only  It is not intended as medical advice for individual conditions or treatments  Talk to your doctor, nurse or pharmacist before following any medical regimen to see if it is safe and effective for you  Lamotrigine (By mouth)   Lamotrigine (la-DESIRAE-tri-shannon)  Treats seizures and bipolar disorder  Brand Name(s): LaMICtal, LaMICtal CD, LaMICtal ODT, LaMICtal ODT Patient Titration Kit Blue, LaMICtal ODT Patient Titration Kit Mona Limber, LaMICtal ODT Patient Titration Kit Orange, [employer] Kit Green, [employer], LaMICtal XR, LaMICtal XR Patient Titration Kit Blue, LaMICtal XR Patient Titration Kit Green, LaMICtal XR Patient Titration Kit Orange   There may be other brand names for this medicine  When This Medicine Should Not Be Used: This medicine is not right for everyone  Do not use it if you had an allergic reaction to lamotrigine  How to Use This Medicine:   Tablet, Chewable Tablet, Dissolving Tablet, Long Acting Tablet  · Take your medicine as directed  Your dose may need to be changed several times to find what works best for you    · Chewable tablet: You may swallow the tablet whole, or you may chew it and then swallow a small amount of water or diluted fruit juice  You may also dissolve the chewable tablet  To do this, put about 1 teaspoon of water or juice in a glass, drop in the tablet, let it sit for about 1 minute so it dissolves, swirl the glass to mix, and then swallow the entire mixture  · Disintegrating tablet: Make sure your hands are dry before you handle the tablet  Place the tablet on your tongue  Move the tablet around in your mouth so it dissolves  · Regular tablet: Swallow the tablet whole  You may break or crush the tablet if your doctor tells you to, but the medicine might leave a bitter taste in your mouth  · Swallow the extended-release tablet whole  Do not crush, break, or chew it  · This medicine should come with a Medication Guide  Ask your pharmacist for a copy if you do not have one  · Missed dose: Take a dose as soon as you remember  If it is almost time for your next dose, wait until then and take a regular dose  Do not take extra medicine to make up for a missed dose  · Store the medicine in a closed container at room temperature, away from heat, moisture, and direct light  Do not use if the blister pack is torn or broken  Drugs and Foods to Avoid:   Ask your doctor or pharmacist before using any other medicine, including over-the-counter medicines, vitamins, and herbal products  · Some foods and medicines can affect how lamotrigine works  Tell your doctor if you are using any of the following:  ¨ Atazanavir, ritonavir, lopinavir, rifampin  ¨ Birth control pills  ¨ Other medicine to control seizures, including carbamazepine, divalproex, oxcarbazepine, phenobarbital, phenytoin, primidone, valproic acid, valproate  Warnings While Using This Medicine:   · Tell your doctor if you are pregnant or breastfeeding, or if you have kidney disease, liver disease, or a history of depression  Tell your doctor if you have had a rash or an allergic reaction to other seizure medicine    · This medicine may cause the following problems:  ¨ Drug reaction with eosinophilia and systemic symptoms (DRESS), which may damage organs such as the liver, kidney, or heart  ¨ Increased risk of thoughts of suicide or other serious mood changes  ¨ Low blood cell counts, which may cause bleeding problems or increase your risk for infection  ¨ Meningitis  ¨ Severe skin rash that can lead to hospitalization or death  · This medicine may make you dizzy or drowsy  Do not drive or do anything else that could be dangerous until you know how this medicine affects you  · Do not stop using this medicine suddenly  Your doctor will need to slowly decrease your dose before you stop it completely  · Your doctor will do lab tests at regular visits to check on the effects of this medicine  Keep all appointments  · Keep all medicine out of the reach of children  Never share your medicine with anyone    Possible Side Effects While Using This Medicine:   Call your doctor right away if you notice any of these side effects:  · Allergic reaction: Itching or hives, swelling in your face or hands, swelling or tingling in your mouth or throat, chest tightness, trouble breathing  · Blistering, peeling, or red skin rash  · Feeling depressed, irritable, or restless  · Fever, chills, cough, sore throat, and body aches  · Fever, skin rash, or swollen glands in your armpits, neck, or groin  · Painful sores in your mouth or around your eyes  · Stiff neck or back, headache, fever, nausea, vomiting  · Thoughts of hurting yourself, other unusual thoughts or behaviors  · Unusual bleeding, bruising, or weakness  · Yellow skin or eyes  If you notice these less serious side effects, talk with your doctor:   · Blurred vision, double vision, or other vision problems  · Clumsiness, dizziness, sleepiness, problems with balance or walking  · Nausea, vomiting  · Runny or stuffy nose  If you notice other side effects that you think are caused by this medicine, tell your doctor  Call your doctor for medical advice about side effects  You may report side effects to FDA at 9-966-FDA-3381  © 2017 [address] Information is for End User's use only and may not be sold, redistributed or otherwise used for commercial purposes  The above information is an  only  It is not intended as medical advice for individual conditions or treatments  Talk to your doctor, nurse or pharmacist before following any medical regimen to see if it is safe and effective for you  Hydroxyzine (By mouth)   Hydroxyzine Pamoate (eri-GSZB-n-zeen CRISTOFER-oh-ate)  Treats anxiety, nausea, vomiting, allergies, skin rash, hives, and itching  May also be used with anesthesia for medical procedures  Brand Name(s): Vistaril   There may be other brand names for this medicine  When This Medicine Should Not Be Used: This medicine is not right for everyone  Do not use it if you had an allergic reaction to hydroxyzine, cetirizine, or levocetirizine  Do not use it during the early part of pregnancy or if you have prolonged QT interval   How to Use This Medicine:   Capsule, Liquid, Tablet  · Your doctor will tell you how much medicine to use  Do not use more than directed  · Oral liquid: Measure the oral liquid medicine with a marked measuring spoon, oral syringe, or medicine cup  Shake well just before use  · Tablet: Swallow whole  Do not break, crush, or chew it  · Missed dose: Take a dose as soon as you remember  If it is almost time for your next dose, wait until then and take a regular dose  Do not take extra medicine to make up for a missed dose  · Store the medicine in a closed container at room temperature, away from heat, moisture, and direct light  Drugs and Foods to Avoid:   Ask your doctor or pharmacist before using any other medicine, including over-the-counter medicines, vitamins, and herbal products  · Some medicines can affect how hydroxyzine works   Tell your doctor if you are using any of the following:  ¨ Droperidol, methadone, ondansetron, pentamidine  ¨ Antibiotic (including azithromycin, clarithromycin, erythromycin, gatifloxacin, moxifloxacin)  ¨ Medicine to treat depression (including citalopram, fluoxetine)  ¨ Medicine to treat heart rhythm problems (including amiodarone, procainamide, quinidine, sotalol)  ¨ Medicine to treat mental health problems (including chlorpromazine, clozapine, iloperidone, quetiapine, ziprasidone)  · Tell your doctor if you use anything else that makes you sleepy  Some examples are allergy medicine, narcotic pain medicine, and alcohol  Warnings While Using This Medicine:   · Tell your doctor if you are pregnant or breastfeeding, or if you have heart disease, heart failure, a slow heartbeat, skin problems, or had a recent heart attack  · This medicine may cause the following problems:  ¨ Changes in your heart rhythm  ¨ Serious skin reaction called acute generalized exanthematous pustulosis (AGEP)  · This medicine may make you drowsy  Do not drive or do anything that could be dangerous until you know how this medicine affects you  · Call your doctor if your symptoms do not improve or if they get worse  · Keep all medicine out of the reach of children  Never share your medicine with anyone  Possible Side Effects While Using This Medicine:   Call your doctor right away if you notice any of these side effects:  · Allergic reaction: Itching or hives, swelling in your face or hands, swelling or tingling in your mouth or throat, chest tightness, trouble breathing  · Fainting, dizziness, lightheadedness  · Fast, pounding, or uneven heartbeat  · Fever, skin rash  · Severe tiredness  If you notice these less serious side effects, talk with your doctor:   · Drowsiness, sleepiness  · Dry mouth  If you notice other side effects that you think are caused by this medicine, tell your doctor  Call your doctor for medical advice about side effects  You may report side effects to FDA at 4-536-FDA-9226  © 2017 [address] Information is for End User's use only and may not be sold, redistributed or otherwise used for commercial purposes  The above information is an  only  It is not intended as medical advice for individual conditions or treatments  Talk to your doctor, nurse or pharmacist before following any medical regimen to see if it is safe and effective for you

## 2018-10-30 NOTE — PROGRESS NOTES
Bright  Pleasant  Hopeful for discharge  Denies any and all concerns including SI/HI/acharya  Pt was compliant with morning medication-denies questions  Reports if discharged today she will be staying with a friend

## 2018-10-30 NOTE — DISCHARGE INSTR - OTHER ORDERS
You will discharge to your friends home at [address], [address] Phone: [phone] or [phone] (friend)  Crisis Plan:    Triggers you identified during your hospital stay that led to suicidal ideations, delusional thoughts, and hallucinations include: intoxication, chronic mental health, limited supports, and conflict with your roommate  Coping skills you identified during your hospital stay include: meditation, deep breathing, and journaling  After discharge, if you find your coping skills are not effective and you continue to feel distressed please contact a member of your ACT team at [phone]  If that is not effective and you feel you are a danger to yourself or others please contact [facility]: [phone], [address] [place] [address] Street:  [phone], Peer Support Talk Line (Seven days a week, 1:00 PM  9:00 PM) Call: [phone] or Text: [phone], Alcohol Anonymous: 105.901.5610, [place] [facility]: 748.392.7138, [place] on [address] [facility] ([facility]) [employer]: [phone]/Email: www mariana  org, or Substance Abuse and Mental Health Services Administration([facility]) [employer], which is a confidential, free, 24-hour-a-day, 365-day-a-year, information service for individuals and family members facing mental health and/or substance use disorders  This service provides referrals to local treatment facilities, support groups, and community-based organizations  Callers can also order free publications and other information    Call 2-823.198.1965/XPWVM: www [facility] gov

## 2018-10-30 NOTE — PROGRESS NOTES
Pt visible on unit  Pt yelling out in hallway  Denies hallucinations but does seem internally preoccupied  Denies SI  Reporting calf pain bilaterally  Declines PRN medication for pain relief  Attempting to walk, rest, and elevate  Will seek out if these attempts are not successful in relieving pain  Compliant with medications  Irritable but cooperative  Will continue to monitor

## 2018-10-30 NOTE — NURSING NOTE
Reviewed discharge packet, prescriptions and follow up care appointments with patient  Patient verbalized an understanding with no additional questions or concerns  Provided patient with paper copy of discharge instructions

## 2018-10-30 NOTE — CASE MANAGEMENT
I called Jo-Ann's act team at [phone] to inform them that Romain De León would be discharged today  I left a message since none of them were available

## 2018-10-30 NOTE — DISCHARGE SUMMARY
Discharge Summary - [facility] 53 y o  female MRN: [id number]  Unit/Bed#: [facility] 247-01 Encounter: [id number]     Admission Date:   Admission Orders     Ordered        10/24/18 1101  Admit Patient to [address] [place] [address] (use in Admission Navigator for ED to [address] [place] [address])  Once                   Discharge Date: 10-  Attending Psychiatrist: Malu Kaplan MD    Reason for Admission/HPI:   Patient is a 13-year-old female with history of schizoaffective disorder and PTS D  She also abused alcohol  She was admitted because of psychosis mood instability lability and plan to shoot herself with a gun  On examination, she is depressed with suicidal ideation feeling hopeless and helpless  She had angry outbursts where she would just scream in her room and she was responding to internal stimuli  Prior to coming in she was found wandering the streets confused and disorganized and was brought in by police  Was necessary for her to come to the unit because disorganized thinking, her mood instability her lability her on unawareness of safety in the community and her psychosis  Patient is a 53 y o  female presents with Signs of acute psychosis  Patient was admitted to psychiatric unit on a voluntarily 201 commitment basis  Primary complaints include: anger outbursts and hearing voices  Onset of symptoms was gradual starting a few days ago with rapidly worsening course since that time  Psychosocial Stressors: health and drug and alcohol  Hospital Course:   Hospital course has been uneventful  The patient was agreeable to increase the Risperdal she had been on coming into the hospital   He however now up to a dose of 7 mg daily which she says has been most helpful in preventing her from acting out and preventing her from being angry by reducing the level of agitation she experiences daily    Also, she is reporting that the Lamictal she has been started on here has also been helpful to a degree to stabilize her mood  The patient initially was inquiring about longer-term care and treatment because she does have a history of multiple psychiatric hospitalizations  There was a consideration of whether or not we should consider and EAC unit for her  She then received information that she has to deal with some legal issues which require her to be fingerprinted which needs to occur tomorrow  Psychiatrically, she is stable with no outbursts no threats of aggression agitation and no suicidal ideation  She will be discharged today so that she could do with some her legal issues that she has tomorrow  Mental Status at time of Discharge:     Appearance:  casually dressed   Behavior:  evasive and guarded   Speech:  normal volume   Mood:  euthymic   Affect:  constricted   Thought Process:  circumstantial   Thought Content:  obsessions   Perceptual Disturbances: None   Risk Potential: none   Sensorium:  person, place and time/date   Cognition:  grossly intact   Consciousness:  alert and awake    Attention: attention span and concentration were age appropriate   Insight:  limited   Judgment: limited   Gait/Station: normal gait/station   Motor Activity: no abnormal movements       Discharge Diagnosis:   Schizoaffective Disorder    Resolved Problems  Date Reviewed: 10/28/2018    None              Discharge Medications:  See after visit summary for reconciled discharge medications provided to patient and family  Discharge instructions/Information to patient and family:   See after visit summary for information provided to patient and family  Provisions for Follow-Up Care:  See after visit summary for information related to follow-up care and any pertinent home health orders  Discharge Statement   I spent 30 minutes discharging the patient  This time was spent on the day of discharge  I had direct contact with the patient on the day of discharge   Additional documentation is required if more than 30 minutes were spent on discharge

## 2018-10-30 NOTE — CASE MANAGEMENT
Bell Attila stated that her friend Cristo Santos will let her stay with him when she is discharged  She stated that his phone number is [phone]  I called him and confirmed that she can stay with him and he will pick her up  He stated that he would be there around 1 pm to pick her up and she can stay with him till she gets an apartment  His address is [address]

## 2018-11-08 ENCOUNTER — HOSPITAL ENCOUNTER (EMERGENCY)
Facility: HOSPITAL | Age: 48
Discharge: HOME | End: 2018-11-08
Attending: EMERGENCY MEDICINE
Payer: COMMERCIAL

## 2018-11-08 VITALS
TEMPERATURE: 97 F | RESPIRATION RATE: 18 BRPM | DIASTOLIC BLOOD PRESSURE: 84 MMHG | SYSTOLIC BLOOD PRESSURE: 121 MMHG | OXYGEN SATURATION: 97 % | BODY MASS INDEX: 21.73 KG/M2 | WEIGHT: 115 LBS | HEART RATE: 120 BPM

## 2018-11-08 DIAGNOSIS — F10.920 ALCOHOLIC INTOXICATION WITHOUT COMPLICATION (HCC): ICD-10-CM

## 2018-11-08 DIAGNOSIS — F32.A DEPRESSION: Primary | ICD-10-CM

## 2018-11-08 LAB
ANION GAP SERPL CALCULATED.3IONS-SCNC: 7 MMOL/L (ref 4–13)
BASOPHILS # BLD AUTO: 0.1 THOUSANDS/ΜL (ref 0–0.1)
BASOPHILS NFR BLD AUTO: 1 % (ref 0–2)
BUN SERPL-MCNC: 5 MG/DL (ref 7–25)
CALCIUM SERPL-MCNC: 9.4 MG/DL (ref 8.6–10.5)
CHLORIDE SERPL-SCNC: 99 MMOL/L (ref 98–107)
CO2 SERPL-SCNC: 28 MMOL/L (ref 21–31)
CREAT SERPL-MCNC: 0.64 MG/DL (ref 0.6–1.2)
EOSINOPHIL # BLD AUTO: 0.3 THOUSAND/ΜL (ref 0–0.61)
EOSINOPHIL NFR BLD AUTO: 2 % (ref 0–5)
ERYTHROCYTE [DISTWIDTH] IN BLOOD BY AUTOMATED COUNT: 13.7 % (ref 11.5–14.5)
ETHANOL SERPL-MCNC: 133.9 MG/DL
GFR SERPL CREATININE-BSD FRML MDRD: 107 ML/MIN/1.73SQ M
GLUCOSE SERPL-MCNC: 81 MG/DL (ref 65–99)
HCT VFR BLD AUTO: 42 % (ref 34.8–46.1)
HGB BLD-MCNC: 14.2 G/DL (ref 12–16)
LYMPHOCYTES # BLD AUTO: 2.9 THOUSANDS/ΜL (ref 0.6–4.47)
LYMPHOCYTES NFR BLD AUTO: 20 % (ref 21–51)
MCH RBC QN AUTO: 32.4 PG (ref 26–34)
MCHC RBC AUTO-ENTMCNC: 33.8 G/DL (ref 31–37)
MCV RBC AUTO: 96 FL (ref 81–99)
MONOCYTES # BLD AUTO: 0.7 THOUSAND/ΜL (ref 0.17–1.22)
MONOCYTES NFR BLD AUTO: 5 % (ref 2–12)
NEUTROPHILS # BLD AUTO: 10.8 THOUSANDS/ΜL (ref 1.4–6.5)
NEUTS SEG NFR BLD AUTO: 73 % (ref 42–75)
NRBC BLD AUTO-RTO: 0 /100 WBCS
PLATELET # BLD AUTO: 361 THOUSANDS/UL (ref 149–390)
PMV BLD AUTO: 7.1 FL (ref 8.6–11.7)
POTASSIUM SERPL-SCNC: 3.8 MMOL/L (ref 3.5–5.5)
RBC # BLD AUTO: 4.38 MILLION/UL (ref 3.9–5.2)
SODIUM SERPL-SCNC: 134 MMOL/L (ref 134–143)
WBC # BLD AUTO: 14.7 THOUSAND/UL (ref 4.8–10.8)

## 2018-11-08 PROCEDURE — 80048 BASIC METABOLIC PNL TOTAL CA: CPT | Performed by: EMERGENCY MEDICINE

## 2018-11-08 PROCEDURE — 85025 COMPLETE CBC W/AUTO DIFF WBC: CPT | Performed by: EMERGENCY MEDICINE

## 2018-11-08 PROCEDURE — 99283 EMERGENCY DEPT VISIT LOW MDM: CPT

## 2018-11-08 PROCEDURE — 36415 COLL VENOUS BLD VENIPUNCTURE: CPT | Performed by: EMERGENCY MEDICINE

## 2018-11-08 PROCEDURE — 80320 DRUG SCREEN QUANTALCOHOLS: CPT | Performed by: EMERGENCY MEDICINE

## 2018-11-08 NOTE — DISCHARGE INSTRUCTIONS
Alcohol Intoxication   WHAT YOU NEED TO KNOW:   Alcohol intoxication is a harmful physical condition caused when you drink more alcohol than your body can handle  It is also called ethanol poisoning, or being drunk  DISCHARGE INSTRUCTIONS:   Medicine: You may be given medicine to manage the signs and symptoms of alcohol intoxication  Take your medicine as directed  Contact your healthcare provider if you think your medicine is not helping or if you have side effects  Tell him if you are allergic to any medicine  Keep a list of the medicines, vitamins, and herbs you take  Include the amounts, and when and why you take them  Bring the list or the pill bottles to follow-up visits  Keep the list with you in case of emergency  Follow up with your healthcare provider as directed:  Write down your questions so you remember to ask them during your visits  Limit or avoid alcohol:  Men should not have more than 2 drinks per day  Women should not have more than 1 drink per day  A drink is 12 ounces of beer, 5 ounces of wine, or 1½ ounces of liquor  Do not drive or operate machines when you drink alcohol:  Make sure you always have someone to drive you when you drink alcohol  For more information:   · Alcoholics Anonymous  Web Address: http://www [employer]/  Contact your healthcare provider if:   · You need help to stop drinking alcohol  · You have trouble with work or school because you drink too much alcohol  · You have physical or verbal fights because of alcohol  · You have questions or concerns about your condition or care  Return to the emergency department if:   · You have sudden trouble breathing or chest pain  · You have a seizure  · You feel sad enough to harm yourself or others  · You have hallucinations (you see or hear things that are not real)  · You cannot stop vomiting  · You were in an accident because of alcohol    © 2017 [address] Information is for End User's use only and may not be sold, redistributed or otherwise used for commercial purposes  All illustrations and images included in CareNotes® are the copyrighted property of A D A M , Inc  or [employer]  The above information is an  only  It is not intended as medical advice for individual conditions or treatments  Talk to your doctor, nurse or pharmacist before following any medical regimen to see if it is safe and effective for you  Depression   WHAT YOU NEED TO KNOW:   Depression is a medical condition that causes feelings of sadness or hopelessness that do not go away  Depression may cause you to lose interest in things you used to enjoy  These feelings may interfere with your daily life  DISCHARGE INSTRUCTIONS:   Call 911 for any of the following:   · You think about harming yourself or someone else  Contact your healthcare provider if:   · Your symptoms do not improve  · You cannot make it to your next appointment  · You have new symptoms  · You have questions or concerns about your condition or care  Medicines:   · Antidepressants  may be given to improve or balance your mood  You may need to take this medicine for several weeks before you begin to feel better  · Take your medicine as directed  Contact your healthcare provider if you think your medicine is not helping or if you have side effects  Tell him of her if you are allergic to any medicine  Keep a list of the medicines, vitamins, and herbs you take  Include the amounts, and when and why you take them  Bring the list or the pill bottles to follow-up visits  Carry your medicine list with you in case of an emergency  Therapy  may be used to treat your depression  A therapist will help you learn to cope with your thoughts and feelings  This can be done alone or in a group  It may also be done with family members or a significant other  Self-care:   · Get regular physical activity    Try to exercise for 30 minutes, 3 to 5 days a week  Work with your healthcare provider to develop an exercise plan that you enjoy  Physical activity may improve your symptoms  · Get enough sleep  Create a routine to help you relax before bed  You can listen to music, read, or do yoga  Try to go to bed and wake up at the same time every day  Sleep is important for emotional health  · Eat a variety of healthy foods from all of the food groups  A healthy meal plan is low in fat, salt, and added sugar  Ask your healthcare provider for more information about a meal plan that is right for you  · Do not drink alcohol or use drugs  Alcohol and drugs can make your symptoms worse  Follow up with your healthcare provider as directed: Your healthcare provider will monitor your progress at follow-up visits  He or she will also monitor your medicine if you take antidepressants  Your healthcare provider will ask if the medicine is helping  Tell him or her about any side effects or problems you may have with your medicine  The type or amount of medicine may need to be changed  Write down your questions so you remember to ask them during your visits  © 2017 [facility] Information is for End User's use only and may not be sold, redistributed or otherwise used for commercial purposes  All illustrations and images included in CareNotes® are the copyrighted property of A D A M , Inc  or Jamari Mota  The above information is an  only  It is not intended as medical advice for individual conditions or treatments  Talk to your doctor, nurse or pharmacist before following any medical regimen to see if it is safe and effective for you

## 2018-11-08 NOTE — ED NOTES
CIS was informed by RN that the pt was requesting to speak to crisis  Upon speaking to pt she requested that she be placed at [address] explained that she has to be medically cleared (e g  legally sober) before we can complete an assessment and do any paperwork in order to refer her anywhere  CIS then asked pt why she wants to be referred to [facility] as we were informed by nursing that there was no SI or HI  Pt then stated, "yes, I am suicidal!"  Pt further stated that she told everyone that the thoughts in her head are extremely intrusive and always there, and she can't take it any more, pt then stated "I'm not going to kill myself, but the thoughts are there"  Pt then spoke of her ACT team looking for an EAC for her because of her situation  Pt also spoke of the man she is living with wanting her to give him sex and she doesn't want to and he knows it so she came here to the hospital   CIS informed RN of pt's SI and RN agreed to have pt put on a 1:1 for safety
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
## 2018-11-08 NOTE — ED NOTES
Pt's ACT team worker arrived and spoke to pt and has requested that she be D/C'd home as she appears to be at her baseline and is now denying any SI/HI  Pt is not medically cleared to be seen by crisis for a full assessment due to being intoxicated, however, crisis did request the attending physician speak to pt and confirm pt is able to contract for safety and wants to be released in her 's care

## 2018-12-11 ENCOUNTER — TELEPHONE (OUTPATIENT)
Dept: FAMILY MEDICINE CLINIC | Facility: CLINIC | Age: 48
End: 2018-12-11

## 2018-12-14 ENCOUNTER — TELEPHONE (OUTPATIENT)
Dept: FAMILY MEDICINE CLINIC | Facility: CLINIC | Age: 48
End: 2018-12-14

## 2018-12-14 NOTE — TELEPHONE ENCOUNTER
Ins [employer]  Received call from Pt regarding a Breast Lump that was not followed up with testing  Pt was under the impression she was to get a 3D mammo & only got a normal test     While reviewing records with Ins  Results show that Pt did have a 3D Mammo 1/29/18 that was normal & she was to repeat in a year  I attempted to reach out to Pt regarding testing & her CC of a Breast Lump - NO ANSWER - will try again, later in the day

## 2018-12-14 NOTE — TELEPHONE ENCOUNTER
Was able to reach Pt & she says she does have a large breast mass - Pt was transferred to  to schedule OV with PCP or PA to evaluate and order necessary testing

## 2018-12-25 ENCOUNTER — HOSPITAL ENCOUNTER (EMERGENCY)
Facility: HOSPITAL | Age: 48
Discharge: HOME | End: 2018-12-25
Attending: EMERGENCY MEDICINE | Admitting: EMERGENCY MEDICINE
Payer: COMMERCIAL

## 2018-12-25 VITALS
TEMPERATURE: 97.8 F | HEART RATE: 102 BPM | SYSTOLIC BLOOD PRESSURE: 132 MMHG | DIASTOLIC BLOOD PRESSURE: 90 MMHG | OXYGEN SATURATION: 94 %

## 2018-12-25 DIAGNOSIS — F25.0 SCHIZOAFFECTIVE DISORDER, BIPOLAR TYPE (HCC): Primary | Chronic | ICD-10-CM

## 2018-12-25 PROCEDURE — 99284 EMERGENCY DEPT VISIT MOD MDM: CPT

## 2018-12-25 RX ORDER — LORAZEPAM 0.5 MG/1
0.5 TABLET ORAL ONCE
Status: COMPLETED | OUTPATIENT
Start: 2018-12-25 | End: 2018-12-25

## 2018-12-25 RX ADMIN — LORAZEPAM 0.5 MG: 0.5 TABLET ORAL at 14:07
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
## 2018-12-25 NOTE — ED NOTES
Crisis worker met with patient, Erlin Kwong from [facility], and Chandana Shafer RN, to discuss plan to discharge patient  Erlin Kwong spoke with patient, who is not presenting with suicidal or homicidal ideations  Patient articulated her actions during today that led her to ED, including contacting the "Feds" and police regarding her symptoms  Patient identified that this course of action is not appropriate and will work towards contact her ACT team and county crisis when the need arises  Patient agreed to do so  All parties agreed that patient can be discharged, Erlin Kwong from ACT will transport patient home  Crisis will notify attending doc to assess and approve of plan      Eliott Buerger  12/25/2018 1424

## 2018-12-25 NOTE — ED NOTES
Crisis contacted [facility], spoke with Melanie Fernández, to identify [address] is full  Crisis contacted [facility], spoke with Sunitha Baca, to identify beds on younger adult-unit has 1 female bed  Crisis informed of potential referral review      John Crooks  12/25/2018 0938

## 2018-12-25 NOTE — ED NOTES
Crisis worker spoke with ACT , Taamr Garcia, via text, who informed that Nilam Stoll, [place] on-, is on her way to ED to see patient       Ortiz Sona  12/25/2018 7460

## 2018-12-25 NOTE — DISCHARGE INSTRUCTIONS
Schizoaffective Disorder   WHAT YOU NEED TO KNOW:   Schizoaffective disorder is a long-term mental illness that may change how you think, feel, and act around others  You may not know what is real and what is not real    DISCHARGE INSTRUCTIONS:   Medicines:   · Antipsychotics: These medicines help decrease psychotic symptoms or severe agitation  You may need antiparkinson medicine to control muscle stiffness, twitches, and restlessness caused by antipsychotic medicines  · Antianxiety medicine: This medicine may be given to decrease anxiety and help you feel calm and relaxed  · Antidepressants: These medicines are given to decrease or stop the symptoms of depression, anxiety, and behavior problems  · Mood stabilizers: These medicines help control mood swings  · Anticonvulsants: This medicine is given to control seizures  It may also be used to decrease violent behavior and control your mood swings  · Blood pressure medicines: These may be used to help decrease motor tics (uncontrolled movements)  They may also help you feel calmer, more focused, and less irritable  · Anticholinergics: This medicine may be given to decrease the side effects of other needed medicines  · Take your medicine as directed  Contact your healthcare provider if you think your medicine is not helping or if you have side effects  Tell him of her if you are allergic to any medicine  Keep a list of the medicines, vitamins, and herbs you take  Include the amounts, and when and why you take them  Bring the list or the pill bottles to follow-up visits  Carry your medicine list with you in case of an emergency  Manage your symptoms: The following may help you feel better or prevent symptoms of schizoaffective disorder from coming back:  · Find support for yourself and your family:  Talk with others to help you cope with your illness better  This may also help to improve how you relate to others      · Keep all medical appointments: This will help manage your disease and the side-effects from medicines you may be taking  · Use your medicines as directed:  Put your medicines in a pillbox placed in an area you can easily see  Use a watch with an alarm to help you remember when it is time to take your medicine  Tell your healthcare provider if you know or think you might be pregnant  Do not stop taking your medicines without your healthcare provider's okay  A sudden stop can cause serious medical problems  · Watch for early signs of a relapse and seek help immediately:      ¨ How you think, feel, and see things has changed  ¨ You behave differently than usual     ¨ You become more nervous and upset, but do not know why  ¨ You eat less and have trouble sleeping  ¨ You have little or no interest in friends or activities  Contact your healthcare provider or psychiatrist if:   · You think you are having a relapse  · You are having side effects from your medicine, or they are not helping  · You are not sleeping well or are sleeping more than usual     · You cannot eat or are eating more than usual     · You have muscle spasms, stiffness, or trouble walking  · Your sad feelings or thoughts change the way you function during the day  · You have questions or concerns about your condition or care  Return to the emergency department if:   · You feel like hurting or killing yourself or others  · You feel that your condition is getting worse  · You feel very upset, threaten someone, or you feel violent  · You suddenly have changes in your vision  · You suddenly have chest pain, trouble breathing, or a fever  © 2017 [address] Information is for End User's use only and may not be sold, redistributed or otherwise used for commercial purposes  All illustrations and images included in CareNotes® are the copyrighted property of A D A M , Inc  or Jamari Mota    The above information is an  only  It is not intended as medical advice for individual conditions or treatments  Talk to your doctor, nurse or pharmacist before following any medical regimen to see if it is safe and effective for you

## 2018-12-25 NOTE — ED NOTES
Crisis worker met with Tenzin Babin [facility] crisis worker from [employer], who arrived in ED to meet with supposed petitioner for 36 commitment  No petitioner for commitment showed  Harlan spoke with Lizeth Bajwa and Romayne Pesa, RN, along with crisis worker, about reasons that brought Lizeth Bajwa to ER  Lizeth Bajwa stated that she believes the FBI is poisoning some of her food and drink  Joes Rowley asked Zhengbon Murraymaira if she would be willing to sign a 201 in the event assessments show that level of care is needed  Lizeth Bajwa stated she was willing  Crisis informed all that Sumeet Madison, ACT worker, is on her way to [facility] ED to meet with patient and treatment team  Jose Rowley obtained signatures from Lizeth Bajwa on required documentation for [facility] crisis  Team to wait to meet with patient and ACT worker to identify supports and plan      Aidee Claire  12/25/2018 5834
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
## 2018-12-25 NOTE — ED NOTES
Crisis worker spoke with Dr Ed Gracia, who is on board to discharge patient  Dr Ed Gracia assessing for discharge       Jennifer Vazquez  12/25/2018 8124
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
## 2019-01-26 ENCOUNTER — HOSPITAL ENCOUNTER (INPATIENT)
Facility: HOSPITAL | Age: 49
LOS: 8 days | Discharge: HOME | DRG: 750 | End: 2019-02-04
Attending: EMERGENCY MEDICINE | Admitting: PSYCHIATRY & NEUROLOGY
Payer: COMMERCIAL

## 2019-01-26 DIAGNOSIS — F25.9 SCHIZOAFFECTIVE DISORDER (HCC): ICD-10-CM

## 2019-01-26 DIAGNOSIS — R45.851 SUICIDAL IDEATION: ICD-10-CM

## 2019-01-26 DIAGNOSIS — F25.0 SCHIZOAFFECTIVE DISORDER, BIPOLAR TYPE (HCC): Primary | Chronic | ICD-10-CM

## 2019-01-26 LAB
ETHANOL SERPL-MCNC: 140.8 MG/DL
ETHANOL SERPL-MCNC: 42.6 MG/DL

## 2019-01-26 PROCEDURE — 81025 URINE PREGNANCY TEST: CPT | Performed by: EMERGENCY MEDICINE

## 2019-01-26 PROCEDURE — 36415 COLL VENOUS BLD VENIPUNCTURE: CPT | Performed by: EMERGENCY MEDICINE

## 2019-01-26 PROCEDURE — 99285 EMERGENCY DEPT VISIT HI MDM: CPT

## 2019-01-26 PROCEDURE — 80320 DRUG SCREEN QUANTALCOHOLS: CPT | Performed by: EMERGENCY MEDICINE

## 2019-01-26 RX ORDER — LORAZEPAM 0.5 MG/1
TABLET ORAL 2 TIMES DAILY
COMMUNITY
End: 2019-02-04 | Stop reason: HOSPADM

## 2019-01-26 NOTE — LETTER
February 4, 2019     Alicia LYNCH-Errol    Patient: Va Rodriguez   YOB: 1970   Date of Visit: 1/26/2019       Dear Dr Chang Zimmer: Thank you for referring Va Rodriguez to me for evaluation  Below are my notes for this consultation  If you have questions, please do not hesitate to call me  I look forward to following your patient along with you  Sincerely,        No name on file  CC: No Recipients      Progress Note - Behavioral Health      Va Rodriugez 53 y o  female MRN: [id number]   Unit/Bed#: [facility] 254-02 Encounter: [id number]     Behavior over the last 24 hours:  Lucila was seen for an inpatient follow-up psychiatric visit this date  Per nursing report, she has been compliant with her medications  She denies any side effects  Appetite within normal limits  She still does complain of nightmares occasionally but at today's visit relates she slept through the whole night without difficulty last night  She relates her mood has improved and she denies any suicidal or homicidal ideation as well as any auditory or visual hallucinations at this time  She is looking forward to being discharged  She has had no behavioral incidents on the unit over the past 24 hours    She is attending group and participating at times      ROS: no complaints     Mental Status Evaluation:     Appearance:  casually dressed, dressed appropriately   Behavior:  pleasant, cooperative   Speech:  normal rate and volume   Mood:  improved   Affect:  normal range and intensity   Thought Process:  logical, coherent   Associations: intact associations   Thought Content:  normal, no overt delusions   Perceptual Disturbances: none   Risk Potential: Suicidal ideation - None  Homicidal ideation - None  Potential for aggression - Yes   Sensorium:  oriented to person, place and time/date   Memory:  recent and remote memory grossly intact   Consciousness:  alert and awake   Attention: decreased concentration and decreased attention span   Insight:  fair   Judgment: fair   Gait/Station: normal gait/station and normal balance   Motor Activity: no abnormal movements      Vital signs in last 24 hours:     Temp:  [97 2 °F (36 2 °C)-97 5 °F (36 4 °C)] 97 2 °F (36 2 °C)  HR:  [] 111  Resp:  [16] 16  BP: (108-125)/(55-73) 108/55     Laboratory results:  I have personally reviewed all pertinent laboratory/tests results      Progress Toward Goals:      progressing        Assessment/Plan      Principal Problem:    Schizoaffective disorder, bipolar type (HCC)  Active Problems:    Uncomplicated alcohol dependence (HCC)    LYNN (generalized anxiety disorder)    Post-traumatic stress disorder, chronic     Recommended Treatment:   1  Continue current medications as prescribed  2  Plan is for discharge tomorrow pending any change in patient's status      All current active medications have been reviewed    Encourage group therapy, milieu therapy and occupational therapy  Behavioral Health checks every 7 minutes        Current Facility-Administered Medications:  acetaminophen 650 mg Oral Q6H PRN Kb Farias MD   acetaminophen 650 mg Oral Q4H PRN Kb Farias MD   acetaminophen 975 mg Oral Q6H PRN Kb Farias MD   albuterol 2 puff Inhalation Q4H PRN Maritza Ravi MD   aluminum-magnesium hydroxide-simethicone 30 mL Oral Q4H PRN Kb Farias MD   benztropine 1 mg Intramuscular Q6H PRN Kb Farias MD   benztropine 1 mg Oral Q6H PRN Kb Farias MD   docusate sodium 100 mg Oral BID Isis Lama, CRNP   gabapentin 600 mg Oral TID Isis Lama, CRNP   hydrOXYzine HCL 25 mg Oral BID Isis Lama, CRNP   hydrOXYzine HCL 25 mg Oral HS PRN Isis Lama, CRNP   hydrOXYzine HCL 50 mg Oral HS Isis Lama, CRNP   LORazepam 0 25 mg Oral TID Flaca Martinez MD   magnesium hydroxide 30 mL Oral Daily PRN Kb Farias MD   nicotine 1 patch Transdermal Daily Kb Farias MD   OLANZapine 5 mg Intramuscular Q3H PRN Kb Farias MD OLANZapine 5 mg Oral Q3H PRN Ambreen Guillory MD   pantoprazole 40 mg Oral Early Morning Ever Velasquez MD   pramoxine-phenylephrine-glycerin-petrolatum   Rectal BID PRN JARED Ruelas   risperiDONE 1 mg Oral BID Salena Herring MD   risperiDONE 3 mg Oral HS Salena Herring MD   traZODone 50 mg Oral HS PRN Ambreen Guillory MD         Risks / Benefits of Treatment:     Risks, benefits, and possible side effects of medications explained to patient and patient verbalizes understanding and agreement for treatment      Counseling / Coordination of Care:        Patient's progress reviewed with nursing staff  Medications, treatment progress and treatment plan reviewed with patient                    Electronically signed by Neela Woodson MD on 2/3/2019  5:09 PM             Progress Notes by JARED Landeros at 2/2/2019 12:31 PM      Author: JARED Landeros Specialty: Psychiatry Author Type: Nurse Practitioner     Filed: 2/2/2019 12:39 PM Date of Service: 2/2/2019 12:31 PM Status: Attested     : JARED Landeros (Nurse Practitioner) Cosigner: Neela Woodson MD at 2/2/2019  2:55 PM     Sensitive Note     Attestation signed by Neela Woodson MD at 2/2/2019 2:55 PM   I supervised the Advanced Practitioner  I discussed the case with the Advanced Practitioner and Treatment Team  I reviewed and agree with the Advanced Practitioner's note, findings, medication orders and recommendations      Kendra Thibodeaux MD 02/02/19        Expand All Collapse All    Progress Note - [address] [facility] 53 y o  female MRN: [id number]   Unit/Bed#: [facility] 254-02 Encounter: [id number]     Behavior over the last 24 hours:  Harris was seen for an inpatient follow-up psychiatric visit this date  Per nursing report, she has been withdrawn to her room but has had no behavioral outbursts on the unit  She is taking her medications as prescribed without any side effects    Lorazepam taper continues and is down to 0 25 mg t i d   At today's visit, she relates she did not sleep well last night but states her appetite is within normal limits  She denies any suicidal or homicidal ideation as well as any auditory or visual hallucinations  No delusional content noted in interview this date  She states she wishes to be discharged as soon as possible      ROS: no complaints     Mental Status Evaluation:     Appearance:  casually dressed, dressed appropriately   Behavior:  normal, pleasant, cooperative   Speech:  normal rate and volume   Mood:  euthymic   Affect:  normal range and intensity   Thought Process:  organized, coherent   Associations: intact associations   Thought Content:  no overt delusions   Perceptual Disturbances: denies auditory hallucinations when asked   Risk Potential: Suicidal ideation - None  Homicidal ideation - None  Potential for aggression - No   Sensorium:  oriented to person, place and time/date   Memory:  recent and remote memory grossly intact   Consciousness:  alert and awake   Attention: attention span and concentration are age appropriate   Insight:  fair   Judgment: fair   Gait/Station: normal gait/station and normal balance   Motor Activity: no abnormal movements      Vital signs in last 24 hours:     Temp:  [97 9 °F (36 6 °C)-98 4 °F (36 9 °C)] 97 9 °F (36 6 °C)  HR:  [60-74] 60  Resp:  [16] 16  BP: (104-120)/(66-70) 104/66     Laboratory results:  I have personally reviewed all pertinent laboratory/tests results      Progress Toward Goals:      progressing        Assessment/Plan      Principal Problem:    Schizoaffective disorder, bipolar type (HCC)  Active Problems:    Uncomplicated alcohol dependence (HCC)    LYNN (generalized anxiety disorder)    Post-traumatic stress disorder, chronic     Recommended Treatment:   Will continue current medication as prescribed    Plan is for discharge early next week pending any change in patient's status      All current active medications have been reviewed  Encourage group therapy, milieu therapy and occupational therapy  [address] checks every 7 minutes        Current Facility-Administered Medications:  acetaminophen 650 mg Oral Q6H PRN Danni Potter MD   acetaminophen 650 mg Oral Q4H PRN Danni Potter MD   acetaminophen 975 mg Oral Q6H PRN Danni Potter MD   albuterol 2 puff Inhalation Q4H PRN Melissa Renteria MD   aluminum-magnesium hydroxide-simethicone 30 mL Oral Q4H PRN Danni Potter MD   benztropine 1 mg Intramuscular Q6H PRN Danni Potter MD   benztropine 1 mg Oral Q6H PRN Danni Potter MD   docusate sodium 100 mg Oral BID Isis Lama, CRNP   gabapentin 600 mg Oral TID Isis Lama, CRNP   hydrOXYzine HCL 25 mg Oral BID Isis Lama, CRNP   hydrOXYzine HCL 25 mg Oral HS PRN Isis Lama, CRNP   hydrOXYzine HCL 50 mg Oral HS Isis Lama, CRNP   LORazepam 0 25 mg Oral TID Isabel Perez MD   magnesium hydroxide 30 mL Oral Daily PRN Danni Potter MD   nicotine 1 patch Transdermal Daily Danni Potter MD   OLANZapine 5 mg Intramuscular Q3H PRN Danni Potter MD   OLANZapine 5 mg Oral Q3H PRN Danni Potter MD   pantoprazole 40 mg Oral Early Morning Melissa Renteria MD   pramoxine-phenylephrine-glycerin-petrolatum   Rectal BID PRN Lily Lama, CRNP   risperiDONE 1 mg Oral BID Isabel Perez MD   risperiDONE 3 mg Oral HS Isabel Perez MD   traZODone 50 mg Oral HS PRN Danni Potter MD         Risks / Benefits of Treatment:     Risks, benefits, and possible side effects of medications explained to patient and patient verbalizes understanding and agreement for treatment      Counseling / Coordination of Care:        Patient's progress reviewed with nursing staff    Medications, treatment progress and treatment plan reviewed with patient                   Electronically signed by Bijal Meadows MD on 2/2/2019  2:55 PM             Progress Notes by JARED Vera at 2/1/2019  2:52 PM      Author: Nallely Baldwin Specialty: Psychiatry Author Type: Nurse Practitioner     Filed: 2/1/2019  2:54 PM Date of Service: 2/1/2019  2:52 PM Status: Attested     : JARED Siegel (Nurse Practitioner) Cosigner: Sherita Meigs, MD at 2/1/2019  3:39 PM     Sensitive Note     Attestation signed by Sherita Meigs, MD at 2/1/2019 3:39 PM   I have seen and examined the patient  I have reviewed the findings, discussed the case and agree with the Advanced Practitioner's note, findings, medication orders and recommendations, with the following additions/exceptions:     Tr Ceron reports feeling a bit better with dose adjustments of risperidone  We are trying not to overmedicate the patient, and strike the right balance of efficacy, and side effects  She is working with the Case Management team to develop a discharge plan       Plan:      Continue current medications      Sherita Meigs, MD 02/01/19        Expand All Collapse All    Progress Note - [address] [facility] 53 y o  female MRN: [id number]   Unit/Bed#: [facility] 254-02 Encounter: [id number]     Behavior over the last 24 hours:  Tr Ceron was seen for an inpatient follow-up psychiatric visit this date  She relates she is doing well and denies any suicidal or homicidal ideation  She also denies any auditory or visual hallucinations at this time  Appetite and sleep are within normal limits  She is taking her medications as prescribed without side effects and feels that they are affective    She wishes to be discharged Monday      ROS: no complaints     Mental Status Evaluation:     Appearance:  casually dressed, dressed appropriately   Behavior:  pleasant   Speech:  normal rate and volume   Mood:  normal   Affect:  normal range and intensity   Thought Process:  organized, logical, coherent   Associations: intact associations   Thought Content:  normal, no overt delusions   Perceptual Disturbances: none   Risk Potential: Suicidal ideation - None  Homicidal ideation - None  Potential for aggression - No   Sensorium:  oriented to person, place and time/date   Memory:  recent and remote memory grossly intact   Consciousness:  alert and awake   Attention: decreased concentration and decreased attention span   Insight:  improving   Judgment: improving   Gait/Station: normal gait/station and normal balance   Motor Activity: no abnormal movements      Vital signs in last 24 hours:     Temp:  [97 5 °F (36 4 °C)-98 6 °F (37 °C)] 97 5 °F (36 4 °C)  HR:  [73-88] 73  Resp:  [16-18] 16  BP: (134-154)/(76-90) 154/76     Laboratory results:  I have personally reviewed all pertinent laboratory/tests results      Progress Toward Goals:      progressing        Assessment/Plan      Principal Problem:    Schizoaffective disorder, bipolar type (HCC)  Active Problems:    Uncomplicated alcohol dependence (HCC)    LYNN (generalized anxiety disorder)    Post-traumatic stress disorder, chronic     Recommended Treatment:   Continue current medications as prescribed  Plan is for discharge Monday pending any changes in patient's status      All current active medications have been reviewed    Encourage group therapy, milieu therapy and occupational therapy  Behavioral Health checks every 7 minutes        Current Facility-Administered Medications:  acetaminophen 650 mg Oral Q6H PRN Sage Garcia MD   acetaminophen 650 mg Oral Q4H PRN Sage Garcia MD   acetaminophen 975 mg Oral Q6H PRN Sage Garcia MD   albuterol 2 puff Inhalation Q4H PRN Masoud Vazquez MD   aluminum-magnesium hydroxide-simethicone 30 mL Oral Q4H PRN Sage Garcia MD   benztropine 1 mg Intramuscular Q6H PRN Sage Garcia MD   benztropine 1 mg Oral Q6H PRN Sage Garcia MD   docusate sodium 100 mg Oral BID Isis Lama, CRNP   gabapentin 600 mg Oral TID Isis Lama, CRNP   hydrOXYzine HCL 25 mg Oral BID Isis Porterics, CRNP   hydrOXYzine HCL 25 mg Oral HS PRN Isis NAGEL Lodics, CRNP   hydrOXYzine HCL 50 mg Oral HS Isis Lama, CRNP LORazepam 0 25 mg Oral TID Ulises Dsouza MD   magnesium hydroxide 30 mL Oral Daily PRN Aurora Mendez MD   nicotine 1 patch Transdermal Daily Aurora Mendez MD   OLANZapine 5 mg Intramuscular Q3H PRN Aurora Mendez MD   OLANZapine 5 mg Oral Q3H PRN Aurora Mendez MD   pantoprazole 40 mg Oral Early Morning Brendan Acevedo MD   pramoxine-phenylephrine-glycerin-petrolatum   Rectal BID PRN JARED Bob   risperiDONE 1 mg Oral BID Ulises Dsouza MD   risperiDONE 3 mg Oral HS Ulises Dsouza MD   traZODone 50 mg Oral HS PRN Aurora Mendez MD         Risks / Benefits of Treatment:     Risks, benefits, and possible side effects of medications explained to patient and patient verbalizes understanding and agreement for treatment      Counseling / Coordination of Care:        Patient's progress discussed with staff in treatment team meeting    Medications, treatment progress and treatment plan reviewed with patient                   Electronically signed by Ulises Dsouza MD on 2/1/2019  3:39 PM
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
## 2019-01-27 LAB
AMPHETAMINES SERPL QL SCN: NEGATIVE
BARBITURATES UR QL: NEGATIVE
BENZODIAZ UR QL: NEGATIVE
COCAINE UR QL: NEGATIVE
EXT PREG TEST URINE: NEGATIVE
METHADONE UR QL: NEGATIVE
OPIATES UR QL SCN: NEGATIVE
PCP UR QL: NEGATIVE
THC UR QL: NEGATIVE

## 2019-01-27 PROCEDURE — 80307 DRUG TEST PRSMV CHEM ANLYZR: CPT | Performed by: EMERGENCY MEDICINE

## 2019-01-27 PROCEDURE — 99222 1ST HOSP IP/OBS MODERATE 55: CPT | Performed by: PSYCHIATRY & NEUROLOGY

## 2019-01-27 RX ORDER — TRAZODONE HYDROCHLORIDE 50 MG/1
50 TABLET ORAL
Status: DISCONTINUED | OUTPATIENT
Start: 2019-01-27 | End: 2019-02-04 | Stop reason: HOSPADM

## 2019-01-27 RX ORDER — ACETAMINOPHEN 325 MG/1
975 TABLET ORAL EVERY 6 HOURS PRN
Status: DISCONTINUED | OUTPATIENT
Start: 2019-01-27 | End: 2019-02-04 | Stop reason: HOSPADM

## 2019-01-27 RX ORDER — BENZTROPINE MESYLATE 1 MG/ML
1 INJECTION INTRAMUSCULAR; INTRAVENOUS EVERY 6 HOURS PRN
Status: DISCONTINUED | OUTPATIENT
Start: 2019-01-27 | End: 2019-02-04 | Stop reason: HOSPADM

## 2019-01-27 RX ORDER — PANTOPRAZOLE SODIUM 40 MG/1
40 TABLET, DELAYED RELEASE ORAL
Status: DISCONTINUED | OUTPATIENT
Start: 2019-01-27 | End: 2019-02-04 | Stop reason: HOSPADM

## 2019-01-27 RX ORDER — OLANZAPINE 5 MG/1
5 TABLET ORAL
Status: DISCONTINUED | OUTPATIENT
Start: 2019-01-27 | End: 2019-02-04 | Stop reason: HOSPADM

## 2019-01-27 RX ORDER — RISPERIDONE 1 MG/1
1 TABLET, FILM COATED ORAL 3 TIMES DAILY
Status: DISCONTINUED | OUTPATIENT
Start: 2019-01-27 | End: 2019-01-28

## 2019-01-27 RX ORDER — HYDROXYZINE HYDROCHLORIDE 25 MG/1
25 TABLET, FILM COATED ORAL 3 TIMES DAILY
Status: DISCONTINUED | OUTPATIENT
Start: 2019-01-27 | End: 2019-01-30

## 2019-01-27 RX ORDER — BENZTROPINE MESYLATE 1 MG/1
1 TABLET ORAL EVERY 6 HOURS PRN
Status: DISCONTINUED | OUTPATIENT
Start: 2019-01-27 | End: 2019-02-04 | Stop reason: HOSPADM

## 2019-01-27 RX ORDER — MAGNESIUM HYDROXIDE/ALUMINUM HYDROXICE/SIMETHICONE 120; 1200; 1200 MG/30ML; MG/30ML; MG/30ML
30 SUSPENSION ORAL EVERY 4 HOURS PRN
Status: DISCONTINUED | OUTPATIENT
Start: 2019-01-27 | End: 2019-02-04 | Stop reason: HOSPADM

## 2019-01-27 RX ORDER — BENZTROPINE MESYLATE 1 MG/1
1 TABLET ORAL 3 TIMES DAILY
Status: DISCONTINUED | OUTPATIENT
Start: 2019-01-27 | End: 2019-02-01

## 2019-01-27 RX ORDER — NICOTINE 21 MG/24HR
1 PATCH, TRANSDERMAL 24 HOURS TRANSDERMAL DAILY
Status: DISCONTINUED | OUTPATIENT
Start: 2019-01-27 | End: 2019-02-04 | Stop reason: HOSPADM

## 2019-01-27 RX ORDER — LORAZEPAM 1 MG/1
1 TABLET ORAL EVERY 4 HOURS PRN
Status: DISCONTINUED | OUTPATIENT
Start: 2019-01-27 | End: 2019-01-29

## 2019-01-27 RX ORDER — ALBUTEROL SULFATE 90 UG/1
2 AEROSOL, METERED RESPIRATORY (INHALATION) EVERY 4 HOURS PRN
Status: DISCONTINUED | OUTPATIENT
Start: 2019-01-27 | End: 2019-02-04 | Stop reason: HOSPADM

## 2019-01-27 RX ORDER — ACETAMINOPHEN 325 MG/1
650 TABLET ORAL EVERY 6 HOURS PRN
Status: DISCONTINUED | OUTPATIENT
Start: 2019-01-27 | End: 2019-02-04 | Stop reason: HOSPADM

## 2019-01-27 RX ORDER — ACETAMINOPHEN 325 MG/1
650 TABLET ORAL EVERY 4 HOURS PRN
Status: DISCONTINUED | OUTPATIENT
Start: 2019-01-27 | End: 2019-02-04 | Stop reason: HOSPADM

## 2019-01-27 RX ORDER — LORAZEPAM 1 MG/1
1 TABLET ORAL
Status: DISCONTINUED | OUTPATIENT
Start: 2019-01-27 | End: 2019-01-29

## 2019-01-27 RX ORDER — LORAZEPAM 2 MG/ML
2 INJECTION INTRAMUSCULAR EVERY 4 HOURS PRN
Status: DISCONTINUED | OUTPATIENT
Start: 2019-01-27 | End: 2019-01-29

## 2019-01-27 RX ORDER — ACETAMINOPHEN 325 MG/1
650 TABLET ORAL EVERY 6 HOURS PRN
Status: DISCONTINUED | OUTPATIENT
Start: 2019-01-27 | End: 2019-01-27 | Stop reason: SDUPTHER

## 2019-01-27 RX ORDER — OLANZAPINE 10 MG/1
5 INJECTION, POWDER, LYOPHILIZED, FOR SOLUTION INTRAMUSCULAR
Status: DISCONTINUED | OUTPATIENT
Start: 2019-01-27 | End: 2019-02-04 | Stop reason: HOSPADM

## 2019-01-27 RX ADMIN — LORAZEPAM 1 MG: 1 TABLET ORAL at 10:16

## 2019-01-27 RX ADMIN — LORAZEPAM 1 MG: 1 TABLET ORAL at 17:10

## 2019-01-27 RX ADMIN — GABAPENTIN 400 MG: 100 CAPSULE ORAL at 15:06

## 2019-01-27 RX ADMIN — BENZTROPINE MESYLATE 1 MG: 1 TABLET ORAL at 15:06

## 2019-01-27 RX ADMIN — HYDROXYZINE HYDROCHLORIDE 25 MG: 25 TABLET ORAL at 15:07

## 2019-01-27 RX ADMIN — HYDROXYZINE HYDROCHLORIDE 25 MG: 25 TABLET ORAL at 21:23

## 2019-01-27 RX ADMIN — LORAZEPAM 1 MG: 1 TABLET ORAL at 03:51

## 2019-01-27 RX ADMIN — RISPERIDONE 1 MG: 1 TABLET ORAL at 15:06

## 2019-01-27 RX ADMIN — PANTOPRAZOLE SODIUM 40 MG: 40 TABLET, DELAYED RELEASE ORAL at 14:08

## 2019-01-27 RX ADMIN — BENZTROPINE MESYLATE 1 MG: 1 TABLET ORAL at 21:24

## 2019-01-27 RX ADMIN — NICOTINE 1 PATCH: 21 PATCH, EXTENDED RELEASE TRANSDERMAL at 09:04

## 2019-01-27 RX ADMIN — RISPERIDONE 1 MG: 1 TABLET ORAL at 21:24

## 2019-01-27 RX ADMIN — GABAPENTIN 400 MG: 100 CAPSULE ORAL at 21:24

## 2019-01-27 RX ADMIN — LORAZEPAM 1 MG: 1 TABLET ORAL at 14:09

## 2019-01-27 NOTE — PLAN OF CARE
ANXIETY     By discharge: Patient will verbalize 2 strategies to deal with anxiety Not Progressing        SUBSTANCE USE/ABUSE     By discharge, will develop insight into their chemical dependency and sustain motivation to continue in recovery Not Progressing     By discharge, patient will have ongoing treatment plan addressing chemical dependency Not Progressing          ANXIETY     Will report anxiety at manageable levels Progressing        PSYCHOSIS     Will report no hallucinations or delusions Progressing        SELF HARM/SUICIDALITY     Will have no self-injury during hospital stay [address] Will exhibit normal sleeping pattern Progressing        SUBSTANCE USE/ABUSE     Will have no detox symptoms and will verbalize plan for changing substance-related behavior Progressing

## 2019-01-27 NOTE — ED NOTES
Spoke to Zahida Bansal in [place] and advised of POCT pregnancy and UDS results       Paz Oliver RN  01/27/19 3864
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
## 2019-01-27 NOTE — PLAN OF CARE
ANXIETY     Will report anxiety at manageable levels Progressing     By discharge: Patient will verbalize 2 strategies to deal with anxiety Progressing        PSYCHOSIS     Will report no hallucinations or delusions Progressing        SELF HARM/SUICIDALITY     Will have no self-injury during hospital stay [address] Will exhibit normal sleeping pattern Progressing        SUBSTANCE USE/ABUSE     Will have no detox symptoms and will verbalize plan for changing substance-related behavior Progressing     By discharge, will develop insight into their chemical dependency and sustain motivation to continue in recovery Progressing     By discharge, patient will have ongoing treatment plan addressing chemical dependency Progressing

## 2019-01-27 NOTE — ED NOTES
Crisis faxed facesheet, 201, and medical clearance statement to [address] for review/bed consideration, however, pt needs UDS and HCG before final determination can be made
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
## 2019-01-27 NOTE — PROGRESS NOTES
Home medications continued  Administered as prescribed  Mood is depressed  No agitation or irritability noted  Controlled  Affect flat  Mood depressed  No paranoia noted this shift  New orders for patient to start Protonix for reflux symptoms  pt verbalized understanding of new medication  Will evaluate effectiveness  Pt provided gingerale for complaints of slight nausea and upset stomach  Able to communicate needs  Safety precautions maintained

## 2019-01-27 NOTE — ED NOTES
EVS called, Patient is eligible for M/A - Behavioral health services, [facility]   [facility]#[id number]

## 2019-01-27 NOTE — PROGRESS NOTES
Pt reports feeling anxious 7/10  Administered ativan 1 mg PO prn  Pt awaiting to see psychiatrist  No scheduled medications ordered at this time  Safety precautions maintained  Will continue to monitor and assess

## 2019-01-27 NOTE — PLAN OF CARE
ANXIETY     Will report anxiety at manageable levels Progressing     By discharge: Patient will verbalize 2 strategies to deal with anxiety Progressing        PSYCHOSIS     Will report no hallucinations or delusions Progressing        SELF HARM/SUICIDALITY     Will have no self-injury during hospital stay [address] Will exhibit normal sleeping pattern Progressing        SUBSTANCE USE/ABUSE     Will have no detox symptoms and will verbalize plan for changing substance-related behavior Progressing     By discharge, will develop insight into their chemical dependency and sustain motivation to continue in recovery Progressing     By discharge, patient will have ongoing treatment plan addressing chemical dependency Progressing

## 2019-01-27 NOTE — ED NOTES
Insurance Authorization:   Phone call placed to [facility]  Phone number: [phone]  Spoke to Elizabeth  3 days approved  Level of care: I/P psych  Review on 1/29/19  Authorization # [id number]

## 2019-01-27 NOTE — CONSULTS
Telepsychiatry Telephone Admission Note      I was consulted by phone to review the case of this 54yo woman who was medically cleared and admitted for psychosis and suicidal ideation  PMH negative for significant medical issues  Allergy to naproxen, lithium, latex, tramadol, spermaceti wax, Depakote  AVSS, reassuring admission labs    UDS/pregnancy test unavailable  Plan:   --Admit to psychiatry  --Obtain UDS/pregnancy test   --Suicide precautions  --Routine admission orders placed

## 2019-01-27 NOTE — TREATMENT PLAN
TREATMENT PLAN REVIEW - [address] Enedelia Moreno 53 y o  1970 female MRN: [id number]    [address] Room / Bed: Danuta Rubin/[facility] 190-13 Encounter: [id number]          Admit Date/Time:  1/26/2019  7:36 PM    Treatment Team: Attending Provider: Tasia Anne MD; Registered Nurse: Mohit Hernandez, RN; Patient Care Assistant: Kylee Deleon;  Patient Care Assistant: Shiraz Boyle; Nursing Student: Jeannette Freitas; Registered Nurse: Cynthia Batres RN    Diagnosis: schizoaffective bipolar, PTSd, Alcohol dependence    Patient Strengths/Assets: cooperative, communication skills, compliant with medication, motivated    Patient Barriers/Limitations: financial instability, lack of financial means, lack of social/family support, lack of stable employment, limited family ties    Short Term Goals: decrease in depressive symptoms, decrease in anxiety symptoms, decrease in paranoid thoughts, improvement in insight, mood stabilization    Long Term Goals: improvement in depression, improvement in anxiety, resolution of depressive symptoms, stabilization of mood, free of suicidal thoughts, adequate sleep    Progress Towards Goals: starting psychiatric medications as prescribed, continue psychiatric medications as prescribed, attends groups, participates in milieu therapy, less agitated, less anxious    Recommended Treatment: medication management, patient medication education, group therapy, milieu therapy, continued Behavioral Health psychiatric evaluation/assessment process    Treatment Frequency: daily medication monitoring, group and milieu therapy daily, monitoring through interdisciplinary rounds, monitoring through weekly patient care conferences    Expected Discharge Date:  1/31/2019    Discharge Plan: attend [place] [address] meetings, referral for outpatient drug and alcohol counseling, referral for outpatient medication management with a psychiatrist, referral for outpatient psychotherapy, referral to outpatient drug and alcohol rehabilitation program    Treatment Plan Created/Updated By: Hayley Meeks MD

## 2019-01-27 NOTE — ED NOTES
Patient is accepted at [facility]  Patient is accepted by Dr Siddhartha gNuyễn pending RDS and pregnancy results  Nurse report is to be called to [facility] prior to patient transfer  Jeffrey Guzmán

## 2019-01-27 NOTE — ED NOTES
Charted Triage Abuse Indicators erroneously  Pt feels safe at home       Vasyl Hendrix RN  01/26/19 2223
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
## 2019-01-28 PROCEDURE — 99232 SBSQ HOSP IP/OBS MODERATE 35: CPT | Performed by: NURSE PRACTITIONER

## 2019-01-28 RX ORDER — RISPERIDONE 1 MG/1
1 TABLET, FILM COATED ORAL 2 TIMES DAILY
Status: DISCONTINUED | OUTPATIENT
Start: 2019-01-28 | End: 2019-02-04 | Stop reason: HOSPADM

## 2019-01-28 RX ORDER — RISPERIDONE 2 MG/1
2 TABLET, FILM COATED ORAL
Status: DISCONTINUED | OUTPATIENT
Start: 2019-01-28 | End: 2019-01-31

## 2019-01-28 RX ADMIN — HYDROXYZINE HYDROCHLORIDE 25 MG: 25 TABLET ORAL at 21:25

## 2019-01-28 RX ADMIN — LORAZEPAM 1 MG: 1 TABLET ORAL at 11:00

## 2019-01-28 RX ADMIN — NICOTINE 1 PATCH: 21 PATCH, EXTENDED RELEASE TRANSDERMAL at 08:41

## 2019-01-28 RX ADMIN — RISPERIDONE 1 MG: 1 TABLET ORAL at 17:47

## 2019-01-28 RX ADMIN — ACETAMINOPHEN 650 MG: 325 TABLET ORAL at 02:16

## 2019-01-28 RX ADMIN — BENZTROPINE MESYLATE 1 MG: 1 TABLET ORAL at 08:40

## 2019-01-28 RX ADMIN — GABAPENTIN 400 MG: 100 CAPSULE ORAL at 21:25

## 2019-01-28 RX ADMIN — GABAPENTIN 400 MG: 100 CAPSULE ORAL at 15:17

## 2019-01-28 RX ADMIN — LORAZEPAM 1 MG: 1 TABLET ORAL at 06:05

## 2019-01-28 RX ADMIN — ALBUTEROL SULFATE 2 PUFF: 90 AEROSOL, METERED RESPIRATORY (INHALATION) at 18:16

## 2019-01-28 RX ADMIN — RISPERIDONE 1 MG: 1 TABLET ORAL at 08:40

## 2019-01-28 RX ADMIN — HYDROXYZINE HYDROCHLORIDE 25 MG: 25 TABLET ORAL at 08:40

## 2019-01-28 RX ADMIN — LORAZEPAM 1 MG: 1 TABLET ORAL at 17:47

## 2019-01-28 RX ADMIN — MAGNESIUM HYDROXIDE 30 ML: 400 SUSPENSION ORAL at 23:56

## 2019-01-28 RX ADMIN — BENZTROPINE MESYLATE 1 MG: 1 TABLET ORAL at 15:17

## 2019-01-28 RX ADMIN — PANTOPRAZOLE SODIUM 40 MG: 40 TABLET, DELAYED RELEASE ORAL at 06:05

## 2019-01-28 RX ADMIN — HYDROXYZINE HYDROCHLORIDE 25 MG: 25 TABLET ORAL at 15:17

## 2019-01-28 RX ADMIN — GABAPENTIN 400 MG: 100 CAPSULE ORAL at 08:40

## 2019-01-28 RX ADMIN — OLANZAPINE 5 MG: 5 TABLET, FILM COATED ORAL at 12:01

## 2019-01-28 RX ADMIN — RISPERIDONE 2 MG: 2 TABLET ORAL at 21:25

## 2019-01-28 NOTE — PLAN OF CARE
ANXIETY     Will report anxiety at manageable levels Not Progressing          ANXIETY     By discharge: Patient will verbalize 2 strategies to deal with anxiety Progressing        DISCHARGE PLANNING - CARE MANAGEMENT     Discharge to post-acute care or home with appropriate resources Progressing        PSYCHOSIS     Will report no hallucinations or delusions Progressing        SELF HARM/SUICIDALITY     Will have no self-injury during hospital stay [address] Will exhibit normal sleeping pattern Progressing        SUBSTANCE USE/ABUSE     Will have no detox symptoms and will verbalize plan for changing substance-related behavior Progressing     By discharge, will develop insight into their chemical dependency and sustain motivation to continue in recovery Progressing     By discharge, patient will have ongoing treatment plan addressing chemical dependency Progressing

## 2019-01-28 NOTE — SOCIAL WORK
SW contacts patient mother Cary Plunkett mother has concerns about patients safety; mother requesting daughter be placed in long term psychiatric care facility
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
## 2019-01-28 NOTE — PROGRESS NOTES
Patient heard screaming in bed  This nurse asks patient if she is ok and patient says " No" but declines to answer questions  Pt does respond when requesting that pt come to staff before yelling next time and possible quiet room if she needs it  Patient agreeable  Will continue to monitor

## 2019-01-28 NOTE — PROGRESS NOTES
Progress Note - Behavioral Health     Sergey Oconnell 53 y o  female MRN: [id number]   Unit/Bed#: [facility] 251-01 Encounter: [id number]    Behavior over the last 24 hours:  Georgette Mcknight was seen for an inpatient follow-up psychiatric visit this date  Per nursing report, she has been labile, tearful, delusional, and is at times screaming  Her appetite is good and she slept through a majority of the night  At today's visit, she was tearful and despondent at times  She was also delusional at times and stated that she wishes to live in a group home  She currently denies any suicidal or homicidal ideation but is endorsing auditory and visual hallucinations, especially at night  ROS: no complaints    Mental Status Evaluation:    Appearance:  casually dressed, dressed appropriately   Behavior:  cooperative, bizarre, restless and fidgety   Speech:  normal rate and volume   Mood:  depressed, dysphoric, anxious, labile   Affect:  reactive   Thought Process:  coherent   Associations: tangential associations   Thought Content:  paranoid ideation   Perceptual Disturbances: auditory hallucinations   Risk Potential: Suicidal ideation - None at present  Homicidal ideation - None  Potential for aggression - No   Sensorium:  oriented to person, place and time/date   Memory:  recent and remote memory grossly intact   Consciousness:  alert and awake   Attention: decreased concentration and decreased attention span   Insight:  fair   Judgment: fair   Gait/Station: normal gait/station and normal balance   Motor Activity: no abnormal movements     Vital signs in last 24 hours:    Temp:  [97 6 °F (36 4 °C)-98 9 °F (37 2 °C)] 98 9 °F (37 2 °C)  HR:  [75-94] 94  Resp:  [16-18] 16  BP: (110-114)/(66-86) 114/86    Laboratory results:  I have personally reviewed all pertinent laboratory/tests results      Progress Toward Goals: progressing    Assessment/Plan   Principal Problem:    Schizoaffective disorder, bipolar type (HCC)  Active Problems: Uncomplicated alcohol dependence (HCC)    LYNN (generalized anxiety disorder)    Post-traumatic stress disorder, chronic    Recommended Treatment:   1  Will increase risperidone all to 1 mg b i d  And 2 mg hs for symptoms of psychosis  2  Will continue all other psychiatric medications as prescribed  3  Discharge disposition and planning are ongoing  All current active medications have been reviewed  Encourage group therapy, milieu therapy and occupational therapy  [facility] checks every 7 minutes      Current Facility-Administered Medications:  acetaminophen 650 mg Oral Q6H PRN Aurora Mendez MD   acetaminophen 650 mg Oral Q4H PRN Aurora Mendez MD   acetaminophen 975 mg Oral Q6H PRN Aurora Mendez MD   albuterol 2 puff Inhalation Q4H PRN Brendan Acevedo MD   aluminum-magnesium hydroxide-simethicone 30 mL Oral Q4H PRN Aurora Mendez MD   benztropine 1 mg Intramuscular Q6H PRN Aurora Mendez MD   benztropine 1 mg Oral Q6H PRN Aurora Mnedez MD   benztropine 1 mg Oral TID Brendan Acevedo MD   gabapentin 400 mg Oral TID Brendan Acevedo MD   hydrOXYzine HCL 25 mg Oral TID Brendan Acevedo MD   LORazepam 2 mg Intramuscular Q4H PRN Aurora Mendez MD   LORazepam 1 mg Oral Q4H PRN Aurora Mendez MD   LORazepam 1 mg Oral TID (diuretic) Brendan Acevedo MD   magnesium hydroxide 30 mL Oral Daily PRN Aurora Mendez MD   nicotine 1 patch Transdermal Daily Aurora Mendez MD   OLANZapine 5 mg Intramuscular Q3H PRN Aurora Mendez MD   OLANZapine 5 mg Oral Q3H PRN Aurora Mendez MD   pantoprazole 40 mg Oral Early Morning Brendan Acevedo MD   risperiDONE 1 mg Oral BID Ulises Dsouza MD   risperiDONE 2 mg Oral HS Ulises Dsouza MD   traZODone 50 mg Oral HS PRN Aurora Mendez MD       Risks / Benefits of Treatment:    Risks, benefits, and possible side effects of medications explained to patient and patient verbalizes understanding and agreement for treatment      Counseling / Coordination of Care:      Patient's progress discussed with staff in treatment team meeting  Medications, treatment progress and treatment plan reviewed with patient

## 2019-01-28 NOTE — PROGRESS NOTES
Patient has spent most of the evening withdrawn to her room  She came out briefly for a snack  Denies any current s/i or a/v  No observed paranoia  Pleasant and cooperative during interaction    Compliant with medications
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
## 2019-01-29 PROCEDURE — 99231 SBSQ HOSP IP/OBS SF/LOW 25: CPT | Performed by: PSYCHIATRY & NEUROLOGY

## 2019-01-29 RX ORDER — LORAZEPAM 1 MG/1
1 TABLET ORAL 3 TIMES DAILY
Status: DISCONTINUED | OUTPATIENT
Start: 2019-01-29 | End: 2019-01-29

## 2019-01-29 RX ORDER — DOCUSATE SODIUM 100 MG/1
100 CAPSULE, LIQUID FILLED ORAL 2 TIMES DAILY
Status: DISCONTINUED | OUTPATIENT
Start: 2019-01-29 | End: 2019-02-04 | Stop reason: HOSPADM

## 2019-01-29 RX ORDER — LORAZEPAM 1 MG/1
1 TABLET ORAL 3 TIMES DAILY
Status: DISCONTINUED | OUTPATIENT
Start: 2019-01-29 | End: 2019-01-30

## 2019-01-29 RX ADMIN — RISPERIDONE 1 MG: 1 TABLET ORAL at 17:35

## 2019-01-29 RX ADMIN — PANTOPRAZOLE SODIUM 40 MG: 40 TABLET, DELAYED RELEASE ORAL at 06:27

## 2019-01-29 RX ADMIN — LORAZEPAM 1 MG: 1 TABLET ORAL at 06:27

## 2019-01-29 RX ADMIN — LORAZEPAM 1 MG: 1 TABLET ORAL at 01:42

## 2019-01-29 RX ADMIN — HYDROXYZINE HYDROCHLORIDE 25 MG: 25 TABLET ORAL at 17:35

## 2019-01-29 RX ADMIN — GLYCERIN, PETROLATUM, PHENYLEPHRINE HCL, PRAMOXINE HCL: 144; 2.5; 10; 15 CREAM TOPICAL at 15:54

## 2019-01-29 RX ADMIN — TRAZODONE HYDROCHLORIDE 50 MG: 50 TABLET ORAL at 20:50

## 2019-01-29 RX ADMIN — HYDROXYZINE HYDROCHLORIDE 25 MG: 25 TABLET ORAL at 20:50

## 2019-01-29 RX ADMIN — GABAPENTIN 400 MG: 100 CAPSULE ORAL at 20:49

## 2019-01-29 RX ADMIN — RISPERIDONE 1 MG: 1 TABLET ORAL at 08:17

## 2019-01-29 RX ADMIN — LORAZEPAM 1 MG: 1 TABLET ORAL at 12:38

## 2019-01-29 RX ADMIN — HYDROXYZINE HYDROCHLORIDE 25 MG: 25 TABLET ORAL at 08:17

## 2019-01-29 RX ADMIN — ALUMINUM HYDROXIDE, MAGNESIUM HYDROXIDE, AND SIMETHICONE 30 ML: 200; 200; 20 SUSPENSION ORAL at 20:57

## 2019-01-29 RX ADMIN — GABAPENTIN 400 MG: 100 CAPSULE ORAL at 17:35

## 2019-01-29 RX ADMIN — NICOTINE 1 PATCH: 21 PATCH, EXTENDED RELEASE TRANSDERMAL at 08:17

## 2019-01-29 RX ADMIN — GABAPENTIN 400 MG: 100 CAPSULE ORAL at 08:17

## 2019-01-29 RX ADMIN — LORAZEPAM 1 MG: 1 TABLET ORAL at 17:35

## 2019-01-29 RX ADMIN — DOCUSATE SODIUM 100 MG: 100 CAPSULE, LIQUID FILLED ORAL at 17:35

## 2019-01-29 NOTE — SOCIAL WORK
SETH received message from Kostas at pt's ACT team; she will be out tomorrow to discuss pt and for a visit

## 2019-01-29 NOTE — PROGRESS NOTES
Patient has been withdrawn to her room for evening shift  In bed and resting/sleeping when this writer approached her for assessment  Denied s/i but was asking where her luis bear was  May not have been fully awake  Out later requesting her medications  Thoughts seemed clearer at that time

## 2019-01-29 NOTE — PLAN OF CARE
Ineffective Coping     Participates in unit activities Not Progressing        PSYCHOSIS     Will report no hallucinations or delusions Not Progressing        SLEEP DISTURBANCE     Will exhibit normal sleeping pattern Not Progressing          ANXIETY     Will report anxiety at manageable levels Progressing     By discharge: Patient will verbalize 2 strategies to deal with anxiety Progressing        DISCHARGE PLANNING - CARE MANAGEMENT     Discharge to post-acute care or home with appropriate resources Progressing        SELF HARM/SUICIDALITY     Will have no self-injury during hospital stay Progressing        SUBSTANCE USE/ABUSE     Will have no detox symptoms and will verbalize plan for changing substance-related behavior Progressing     By discharge, will develop insight into their chemical dependency and sustain motivation to continue in recovery Progressing     By discharge, patient will have ongoing treatment plan addressing chemical dependency Progressing

## 2019-01-29 NOTE — PROGRESS NOTES
Progress Note - [address] 53 y o  female MRN: [id number]   Unit/Bed#: [facility] 251-01 Encounter: [id number]    Behavior over the last 24 hours: improved  Georgette Mcknight feels a little better after medication adjustment, but overall hopeless about her future  Sleep: insomnia  Appetite: decreased  Medication side effects: Yes - sedation   ROS: no complaints    Mental Status Evaluation:    Appearance:  age appropriate   Behavior:  guarded   Speech:  normal rate, normal volume, normal pitch   Mood:  depressed   Affect:  constricted   Thought Process:  perseverative   Associations: perseverative   Thought Content:  obsessions   Perceptual Disturbances: auditory hallucinations   Risk Potential: Suicidal ideation - Yes  Homicidal ideation - None  Potential for aggression - evaluating   Sensorium:  oriented to person, place and time/date   Memory:  recent and remote memory grossly intact   Consciousness:  alert and awake   Attention: attention span and concentration are age appropriate   Insight:  impaired   Judgment: impaired   Gait/Station: normal gait/station   Motor Activity: no abnormal movements     Vital signs in last 24 hours:    Temp:  [97 9 °F (36 6 °C)-98 9 °F (37 2 °C)] 97 9 °F (36 6 °C)  HR:  [] 103  Resp:  [16-18] 18  BP: (100-114)/(62-86) 100/62    Laboratory results:    I have personally reviewed all pertinent laboratory/tests results    Most Recent Labs:   Lab Results   Component Value Date    WBC 14 70 (H) 11/08/2018    RBC 4 38 11/08/2018    HGB 14 2 11/08/2018    HCT 42 0 11/08/2018     11/08/2018    RDW 13 7 11/08/2018    NEUTROABS 10 80 (H) 11/08/2018    SODIUM 134 11/08/2018    K 3 8 11/08/2018    CL 99 11/08/2018    CO2 28 11/08/2018    BUN 5 (L) 11/08/2018    CREATININE 0 64 11/08/2018    GLUC 81 11/08/2018    GLUF 94 10/31/2017    CALCIUM 9 4 11/08/2018    AST 16 10/25/2018    ALT 13 10/25/2018    ALKPHOS 72 10/25/2018    TP 6 7 10/25/2018    ALB 4 1 10/25/2018    TBILI 0 20 10/25/2018    CHOLESTEROL 296 (H) 10/25/2018    HDL 69 (H) 10/25/2018    TRIG 285 (H) 10/25/2018    LDLCALC 170 10/25/2018    [place] 227 10/25/2018    AMMONIA 28 10/27/2017    PKE9XADCYBZH 1 140 10/24/2018    FREET4 0 89 03/24/2016    PREGSERUM Negative 10/25/2018    HCG <2 00 04/10/2014    RPR Non-Reactive 10/25/2018    HGBA1C 4 9 10/25/2018    EAG 94 10/25/2018       Progress Toward Goals: improving    Assessment/Plan   Principal Problem:    Schizoaffective disorder, bipolar type (HCC)  Active Problems:    Uncomplicated alcohol dependence (HCC)    LYNN (generalized anxiety disorder)    Post-traumatic stress disorder, chronic    Recommended Treatment:     Planned medication and treatment changes: All current active medications have been reviewed  Encourage group therapy, milieu therapy and occupational therapy  Behavioral Health checks every 7 minutes    Observe and treat, adjust medications and arrange aftercare plans         Current Facility-Administered Medications:  acetaminophen 650 mg Oral Q6H PRN Tiffany Harkins MD   acetaminophen 650 mg Oral Q4H PRN Tiffany Harkins MD   acetaminophen 975 mg Oral Q6H PRN Tiffany Harkins MD   albuterol 2 puff Inhalation Q4H PRN Felisha Aguilar MD   aluminum-magnesium hydroxide-simethicone 30 mL Oral Q4H PRN Tiffany Harkins MD   benztropine 1 mg Intramuscular Q6H PRN Tiffany Harkins MD   benztropine 1 mg Oral Q6H PRN Tiffany Harkins MD   benztropine 1 mg Oral TID Felisha Aguilar MD   docusate sodium 100 mg Oral BID JARED Jones   gabapentin 400 mg Oral TID Felisha Aguilar MD   hydrOXYzine HCL 25 mg Oral TID Felisha Aguilar MD   LORazepam 1 mg Oral TID JARED Jones   magnesium hydroxide 30 mL Oral Daily PRN Tiffany Harkins MD   nicotine 1 patch Transdermal Daily Tiffany Harkins MD   OLANZapine 5 mg Intramuscular Q3H PRN Tiffany Harkins MD   OLANZapine 5 mg Oral Q3H PRN Tiffany Harkins MD   pantoprazole 40 mg Oral Early Morning Felisha Aguilar MD pramoxine-phenylephrine-glycerin-petrolatum  Rectal BID PRN JARED Jones   risperiDONE 1 mg Oral BID Vimal Elkins MD   risperiDONE 2 mg Oral HS Vimal Elkins MD   traZODone 50 mg Oral HS PRN Donato Richter MD       Risks / Benefits of Treatment:    Risks, benefits, and possible side effects of medications explained to patient and patient verbalizes understanding and agreement for treatment  Counseling / Coordination of Care:    Patient's progress discussed with staff in treatment team meeting  Medications, treatment progress and treatment plan reviewed with patient      Vimal Elkins MD 01/29/19

## 2019-01-29 NOTE — PROGRESS NOTES
Patient bright, alert, awake ate breakfast with peers, showered early in am  Patient is calm and answering questions appropriately  Pt refused scheduled cogentin related to complaints of dry mouth, this nurse will pass info provider  Otherwise compliant with medications and care, no outbursts this am  Patient c/o fleeting auditory hallucinations but denies si hi and hallucinations at this moment  Will maintain on safety precautions and 7 minute checks, no needs identified

## 2019-01-30 PROCEDURE — 99232 SBSQ HOSP IP/OBS MODERATE 35: CPT | Performed by: NURSE PRACTITIONER

## 2019-01-30 RX ORDER — LORAZEPAM 0.5 MG/1
0.5 TABLET ORAL 3 TIMES DAILY
Status: DISCONTINUED | OUTPATIENT
Start: 2019-01-30 | End: 2019-01-31

## 2019-01-30 RX ORDER — GABAPENTIN 300 MG/1
600 CAPSULE ORAL 3 TIMES DAILY
Status: DISCONTINUED | OUTPATIENT
Start: 2019-01-30 | End: 2019-02-04 | Stop reason: HOSPADM

## 2019-01-30 RX ORDER — HYDROXYZINE HYDROCHLORIDE 25 MG/1
50 TABLET, FILM COATED ORAL 3 TIMES DAILY
Status: DISCONTINUED | OUTPATIENT
Start: 2019-01-30 | End: 2019-02-01

## 2019-01-30 RX ADMIN — RISPERIDONE 1 MG: 1 TABLET ORAL at 07:38

## 2019-01-30 RX ADMIN — MAGNESIUM HYDROXIDE 30 ML: 400 SUSPENSION ORAL at 23:55

## 2019-01-30 RX ADMIN — LORAZEPAM 0.5 MG: 0.5 TABLET ORAL at 17:02

## 2019-01-30 RX ADMIN — HYDROXYZINE HYDROCHLORIDE 50 MG: 25 TABLET ORAL at 20:31

## 2019-01-30 RX ADMIN — LORAZEPAM 0.5 MG: 0.5 TABLET ORAL at 12:11

## 2019-01-30 RX ADMIN — HYDROXYZINE HYDROCHLORIDE 50 MG: 25 TABLET ORAL at 15:25

## 2019-01-30 RX ADMIN — GABAPENTIN 600 MG: 300 CAPSULE ORAL at 20:31

## 2019-01-30 RX ADMIN — NICOTINE 1 PATCH: 21 PATCH, EXTENDED RELEASE TRANSDERMAL at 07:36

## 2019-01-30 RX ADMIN — LORAZEPAM 1 MG: 1 TABLET ORAL at 07:37

## 2019-01-30 RX ADMIN — GABAPENTIN 600 MG: 300 CAPSULE ORAL at 15:25

## 2019-01-30 RX ADMIN — GABAPENTIN 400 MG: 100 CAPSULE ORAL at 07:37

## 2019-01-30 RX ADMIN — HYDROXYZINE HYDROCHLORIDE 25 MG: 25 TABLET ORAL at 07:37

## 2019-01-30 RX ADMIN — RISPERIDONE 1 MG: 1 TABLET ORAL at 17:02

## 2019-01-30 RX ADMIN — OLANZAPINE 5 MG: 5 TABLET, FILM COATED ORAL at 23:55

## 2019-01-30 RX ADMIN — RISPERIDONE 2 MG: 2 TABLET ORAL at 21:13

## 2019-01-30 RX ADMIN — PANTOPRAZOLE SODIUM 40 MG: 40 TABLET, DELAYED RELEASE ORAL at 05:24

## 2019-01-30 NOTE — SOCIAL WORK
SETH met with pt and Jerardo Greene from the ACT team   Pt tearful throughout meeting due to her boyfriend moving away and her current unhealthy living environment  Pt still presents with paranoia  Pt states that she can now live with another friend who she reports she will be safe with while she waits for a group home  Pt complained about being taken off Lamictal; Jerardo Greene reminded pt that she was taken off due to her drinking  Pt admits that she is struggling with her alcoholism  Pt also states she has been sick for awhile and thinks she has breast cancer; Jerardo Greene again reminded pt that she has had several appointments and procedures scheduled but pt has to cancel them as she ends up drinking the night before  SW informed pt that is sounds like she needs inpatient rehab; pt agreed  Jerardo Greene informed SW that she was last at [facility] about a year ago and left AMA; pt states they were picking on her  Pt informed us that she will not go to [place] [address] again  Pt still agreeing but focusing on all the negative experiences she has had in the past   SETH encouraged pt to remain optimistic and explained how if she continue to focus on the negatives it is going to make it hard for her to focus on treatment  Jerardo Greene reminded pt that she needs to take things day by day

## 2019-01-30 NOTE — PLAN OF CARE
ANXIETY     Will report anxiety at manageable levels Progressing     By discharge: Patient will verbalize 2 strategies to deal with anxiety Progressing        DISCHARGE PLANNING - CARE MANAGEMENT     Discharge to post-acute care or home with appropriate resources Progressing        Ineffective Coping     Participates in unit activities Progressing        PSYCHOSIS     Will report no hallucinations or delusions Progressing        SELF HARM/SUICIDALITY     Will have no self-injury during hospital stay [address] Will exhibit normal sleeping pattern Progressing        SUBSTANCE USE/ABUSE     Will have no detox symptoms and will verbalize plan for changing substance-related behavior Progressing     By discharge, will develop insight into their chemical dependency and sustain motivation to continue in recovery Progressing     By discharge, patient will have ongoing treatment plan addressing chemical dependency Progressing

## 2019-01-30 NOTE — PROGRESS NOTES
Pt woke this morning and came to nurse desk  0600 medication administered  Pt apologized for her behavior last evening  Pt is polite and pleasant so far  Pt again stated she has been throwing up since Thanksgiving  Pt reports it is because "I live with a dirty old man who had cancer and he is constantly spitting in the sink on the dishes and it makes me sick"  Will continue to monitor

## 2019-01-30 NOTE — PROGRESS NOTES
Progress Note - Behavioral Health     Celia Perdue 53 y o  female MRN: [id number]   Unit/Bed#: [facility] 251-01 Encounter: [id number]    Behavior over the last 24 hours:  Maury Francisco was seen for an inpatient follow-up psychiatric visit this date  She relates she is still feeling extremely depressed, anxious, and is hearing voices at night  She states these voices are not telling her to hurt herself but are calling for help  Per nursing report, she has been screaming at night and keeping her roommate awake  At today's assessment, she was tearful at times and related that she has decided to go to inpatient rehabilitation for alcohol addiction  She discuss this with her ACT team today  She wishes to have her Ativan tapered so she can be accepted to an appropriate program   Appetite is within normal limits        ROS: no complaints    Mental Status Evaluation:    Appearance:  dressed appropriately   Behavior:  cooperative   Speech:  normal rate and volume   Mood:  depressed, dysphoric, anxious, labile   Affect:  mood-congruent   Thought Process:  organized, logical   Associations: intact associations   Thought Content:  normal, no overt delusions   Perceptual Disturbances: auditory hallucinations of Voices coming out of the vents at night calling for help   Risk Potential: Suicidal ideation - None at present  Homicidal ideation - None  Potential for aggression - No   Sensorium:  oriented to person, place, time/date and situation   Memory:  recent and remote memory grossly intact   Consciousness:  alert and awake   Attention: decreased concentration and decreased attention span   Insight:  improving   Judgment: fair   Gait/Station: normal gait/station and normal balance   Motor Activity: no abnormal movements     Vital signs in last 24 hours:    Temp:  [97 6 °F (36 4 °C)-98 7 °F (37 1 °C)] 97 6 °F (36 4 °C)  HR:  [76-77] 76  Resp:  [18-20] 18  BP: (124-131)/(70-78) 131/78    Laboratory results:  I have personally reviewed all pertinent laboratory/tests results  Progress Toward Goals: progressing    Assessment/Plan   Principal Problem:    Schizoaffective disorder, bipolar type (HCC)  Active Problems:    Uncomplicated alcohol dependence (HCC)    LYNN (generalized anxiety disorder)    Post-traumatic stress disorder, chronic    Recommended Treatment:   Ativan decreased to 0 5 mg t i d  Atarax increased to 50 mg t i d  Neurontin increased to 600 mg t i d   Will continue Cogentin and risperidone as prescribed and consider increasing risperidone or adding adjunctive therapy if symptoms of psychosis do not resolve  Labs ordered  All current active medications have been reviewed    Encourage group therapy, milieu therapy and occupational therapy  Behavioral Health checks every 7 minutes      Current Facility-Administered Medications:  acetaminophen 650 mg Oral Q6H PRN Sage Garcia MD   acetaminophen 650 mg Oral Q4H PRN Sage Garcia MD   acetaminophen 975 mg Oral Q6H PRN Sage Garcia MD   albuterol 2 puff Inhalation Q4H PRN Masoud Vazquez MD   aluminum-magnesium hydroxide-simethicone 30 mL Oral Q4H PRN Sage Garcia MD   benztropine 1 mg Intramuscular Q6H PRN Sage Garcia MD   benztropine 1 mg Oral Q6H PRN Sage Garcia MD   benztropine 1 mg Oral TID Masoud Vazquez MD   docusate sodium 100 mg Oral BID Isis Lama, RENATANP   gabapentin 600 mg Oral TID Isis Lama, JARED   hydrOXYzine HCL 50 mg Oral TID Isis Lama, JARED   LORazepam 0 5 mg Oral TID Isis Lama, JARED   magnesium hydroxide 30 mL Oral Daily PRN Sage Garcia MD   nicotine 1 patch Transdermal Daily Sage Garcia MD   OLANZapine 5 mg Intramuscular Q3H PRN Sage Garcia MD   OLANZapine 5 mg Oral Q3H PRN Sage Garcia MD   pantoprazole 40 mg Oral Early Morning Masoud Vazquez MD   pramoxine-phenylephrine-glycerin-petrolatum  Rectal BID PRN Yeimy Lama, RENATANP   risperiDONE 1 mg Oral BID Nicole De La Cruz MD   risperiDONE 2 mg Oral HS Nicole De La Cruz MD traZODone 50 mg Oral HS PRN Kb Farias MD       Risks / Benefits of Treatment:    Risks, benefits, and possible side effects of medications explained to patient and patient verbalizes understanding and agreement for treatment  Counseling / Coordination of Care:      Patient's progress discussed with staff in treatment team meeting  Medications, treatment progress and treatment plan reviewed with patient

## 2019-01-30 NOTE — CASE MANAGEMENT
CM met with patient to review the Treatment  Plan  The patient was cooperative on approach and stated she felt 'safe' speaking with CM  Reported ongoing AH that were not commanding her, but often either were "good and happy" or said they would "kill themselves " Did admit to suicidal thoughts from "my own head " Denied plans while hospitalized, though stated she would harm herself 'outside'  Tearful, expressing sadness and futility with her life which she stated has deteriorated  Reported feeling better following long term treatment in the [facility] after which she was able to maintain independent living and plan for a wedding  She stated she felt so 'weakened' and far from that level of ability  Believes her only option for recovery is referral to long term once again  Looking forward to a meeting with her ACT Team therapist who was arriving today  Goals and Objectives of the Treatment Plan reviewed; Treatment Plan was signed by the patient

## 2019-01-30 NOTE — PROGRESS NOTES
Pt has been withdrawn to her room  Labile, tearful, demanding  Pt could be heard several times screaming from her room  Difficult to redirect at times, poor concentration  Pt demanding medications early, when explained meds were not schedule at that time pt began cursing  Unit expectations discussed  When pt was administered her HS medications, pt threatened to have staff arrested for waking her up (pt was not sleeping)  Pt refused HS cogentin and risperdal   PRN trazodone administered to help sleep  Able to make needs known  Will continue to monitor

## 2019-01-30 NOTE — PROGRESS NOTES
Patient walking in hallways then back to bed and became tearful patient si delusional , paranoid and accusing people of cutting her hair and making her wear makeup  Pt is labile, going from calm and talking to yelling frequently  She continues to c/o constipation and refuses colace stating ineffectiveness  Pt states she had 1 very small  Normal BM yesterday am and today am  Encouraged walking and increased fluid intake  Pt agreeable  Will maintain in safety precautions and 7 minute checks, no needs identified

## 2019-01-30 NOTE — PROGRESS NOTES
Pt has random outbursts of yelling and cursing  Pt appears to be having several conversations with nobody  Agitated at times  Refused zyprexa, states it causes suicidal thoughts  Refusing risperdal, states this is poison

## 2019-01-31 LAB
ALBUMIN SERPL BCP-MCNC: 3.6 G/DL (ref 3.5–5.7)
ALP SERPL-CCNC: 73 U/L (ref 40–150)
ALT SERPL W P-5'-P-CCNC: 35 U/L (ref 7–52)
ANION GAP SERPL CALCULATED.3IONS-SCNC: 6 MMOL/L (ref 4–13)
AST SERPL W P-5'-P-CCNC: 34 U/L (ref 13–39)
BASOPHILS # BLD AUTO: 0 THOUSANDS/ΜL (ref 0–0.1)
BASOPHILS NFR BLD AUTO: 1 % (ref 0–2)
BILIRUB SERPL-MCNC: 0.2 MG/DL (ref 0.2–1)
BUN SERPL-MCNC: 8 MG/DL (ref 7–25)
CALCIUM SERPL-MCNC: 8.7 MG/DL (ref 8.6–10.5)
CHLORIDE SERPL-SCNC: 103 MMOL/L (ref 98–107)
CHOLEST SERPL-MCNC: 229 MG/DL (ref 0–200)
CO2 SERPL-SCNC: 26 MMOL/L (ref 21–31)
CREAT SERPL-MCNC: 0.6 MG/DL (ref 0.6–1.2)
EOSINOPHIL # BLD AUTO: 0.3 THOUSAND/ΜL (ref 0–0.61)
EOSINOPHIL NFR BLD AUTO: 5 % (ref 0–5)
ERYTHROCYTE [DISTWIDTH] IN BLOOD BY AUTOMATED COUNT: 14.6 % (ref 11.5–14.5)
GFR SERPL CREATININE-BSD FRML MDRD: 109 ML/MIN/1.73SQ M
GLUCOSE SERPL-MCNC: 97 MG/DL (ref 65–99)
HCT VFR BLD AUTO: 39.7 % (ref 34.8–46.1)
HDLC SERPL-MCNC: 56 MG/DL (ref 40–60)
HGB BLD-MCNC: 12.7 G/DL (ref 12–16)
LYMPHOCYTES # BLD AUTO: 1.6 THOUSANDS/ΜL (ref 0.6–4.47)
LYMPHOCYTES NFR BLD AUTO: 27 % (ref 21–51)
MCH RBC QN AUTO: 32.9 PG (ref 26–34)
MCHC RBC AUTO-ENTMCNC: 32 G/DL (ref 31–37)
MCV RBC AUTO: 103 FL (ref 81–99)
MONOCYTES # BLD AUTO: 0.7 THOUSAND/ΜL (ref 0.17–1.22)
MONOCYTES NFR BLD AUTO: 12 % (ref 2–12)
NEUTROPHILS # BLD AUTO: 3.4 THOUSANDS/ΜL (ref 1.4–6.5)
NEUTS SEG NFR BLD AUTO: 55 % (ref 42–75)
NONHDLC SERPL-MCNC: 173 MG/DL
NRBC BLD AUTO-RTO: 0 /100 WBCS
PLATELET # BLD AUTO: 188 THOUSANDS/UL (ref 149–390)
PMV BLD AUTO: 8 FL (ref 8.6–11.7)
POTASSIUM SERPL-SCNC: 4.2 MMOL/L (ref 3.5–5.5)
PROT SERPL-MCNC: 5.9 G/DL (ref 6.4–8.9)
RBC # BLD AUTO: 3.86 MILLION/UL (ref 3.9–5.2)
SODIUM SERPL-SCNC: 135 MMOL/L (ref 134–143)
TRIGL SERPL-MCNC: 430 MG/DL (ref 44–166)
TSH SERPL DL<=0.05 MIU/L-ACNC: 3.86 UIU/ML (ref 0.45–5.33)
WBC # BLD AUTO: 6.1 THOUSAND/UL (ref 4.8–10.8)

## 2019-01-31 PROCEDURE — G8979 MOBILITY GOAL STATUS: HCPCS

## 2019-01-31 PROCEDURE — G8980 MOBILITY D/C STATUS: HCPCS

## 2019-01-31 PROCEDURE — 84443 ASSAY THYROID STIM HORMONE: CPT | Performed by: NURSE PRACTITIONER

## 2019-01-31 PROCEDURE — 97162 PT EVAL MOD COMPLEX 30 MIN: CPT

## 2019-01-31 PROCEDURE — 97110 THERAPEUTIC EXERCISES: CPT

## 2019-01-31 PROCEDURE — G8978 MOBILITY CURRENT STATUS: HCPCS

## 2019-01-31 PROCEDURE — 85025 COMPLETE CBC W/AUTO DIFF WBC: CPT | Performed by: NURSE PRACTITIONER

## 2019-01-31 PROCEDURE — 80053 COMPREHEN METABOLIC PANEL: CPT | Performed by: NURSE PRACTITIONER

## 2019-01-31 PROCEDURE — 80061 LIPID PANEL: CPT | Performed by: NURSE PRACTITIONER

## 2019-01-31 RX ORDER — LORAZEPAM 0.5 MG/1
0.25 TABLET ORAL 3 TIMES DAILY
Status: DISCONTINUED | OUTPATIENT
Start: 2019-01-31 | End: 2019-02-04 | Stop reason: HOSPADM

## 2019-01-31 RX ADMIN — DOCUSATE SODIUM 100 MG: 100 CAPSULE, LIQUID FILLED ORAL at 08:26

## 2019-01-31 RX ADMIN — PANTOPRAZOLE SODIUM 40 MG: 40 TABLET, DELAYED RELEASE ORAL at 05:53

## 2019-01-31 RX ADMIN — LORAZEPAM 0.25 MG: 0.5 TABLET ORAL at 17:24

## 2019-01-31 RX ADMIN — RISPERIDONE 3 MG: 2 TABLET ORAL at 21:05

## 2019-01-31 RX ADMIN — LORAZEPAM 0.25 MG: 0.5 TABLET ORAL at 12:22

## 2019-01-31 RX ADMIN — RISPERIDONE 1 MG: 1 TABLET ORAL at 08:27

## 2019-01-31 RX ADMIN — RISPERIDONE 1 MG: 1 TABLET ORAL at 17:24

## 2019-01-31 RX ADMIN — HYDROXYZINE HYDROCHLORIDE 50 MG: 25 TABLET ORAL at 16:50

## 2019-01-31 RX ADMIN — GABAPENTIN 600 MG: 300 CAPSULE ORAL at 08:26

## 2019-01-31 RX ADMIN — HYDROXYZINE HYDROCHLORIDE 50 MG: 25 TABLET ORAL at 21:05

## 2019-01-31 RX ADMIN — NICOTINE 1 PATCH: 21 PATCH, EXTENDED RELEASE TRANSDERMAL at 08:26

## 2019-01-31 RX ADMIN — ALUMINUM HYDROXIDE, MAGNESIUM HYDROXIDE, AND SIMETHICONE 30 ML: 200; 200; 20 SUSPENSION ORAL at 21:04

## 2019-01-31 RX ADMIN — LORAZEPAM 0.5 MG: 0.5 TABLET ORAL at 08:30

## 2019-01-31 RX ADMIN — HYDROXYZINE HYDROCHLORIDE 50 MG: 25 TABLET ORAL at 08:26

## 2019-01-31 RX ADMIN — GABAPENTIN 600 MG: 300 CAPSULE ORAL at 21:05

## 2019-01-31 RX ADMIN — BENZTROPINE MESYLATE 1 MG: 1 TABLET ORAL at 08:27

## 2019-01-31 RX ADMIN — GABAPENTIN 600 MG: 300 CAPSULE ORAL at 16:50
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
## 2019-01-31 NOTE — PROGRESS NOTES
Pt was seen during breakfast and has been slightly irritable  Pt denies current si/hi at this time  Pt denies current thoughts of si/hi at this time  Pt was able to receive her medications and meals without any issues  Pt denies any current AH of a woman in her room at this time
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
## 2019-02-01 PROCEDURE — 99231 SBSQ HOSP IP/OBS SF/LOW 25: CPT | Performed by: PSYCHIATRY & NEUROLOGY

## 2019-02-01 RX ORDER — HYDROXYZINE HYDROCHLORIDE 25 MG/1
50 TABLET, FILM COATED ORAL
Status: DISCONTINUED | OUTPATIENT
Start: 2019-02-01 | End: 2019-02-04 | Stop reason: HOSPADM

## 2019-02-01 RX ORDER — HYDROXYZINE HYDROCHLORIDE 25 MG/1
25 TABLET, FILM COATED ORAL 2 TIMES DAILY
Status: DISCONTINUED | OUTPATIENT
Start: 2019-02-01 | End: 2019-02-04 | Stop reason: HOSPADM

## 2019-02-01 RX ORDER — HYDROXYZINE HYDROCHLORIDE 25 MG/1
25 TABLET, FILM COATED ORAL
Status: DISCONTINUED | OUTPATIENT
Start: 2019-02-01 | End: 2019-02-04 | Stop reason: HOSPADM

## 2019-02-01 RX ADMIN — DOCUSATE SODIUM 100 MG: 100 CAPSULE, LIQUID FILLED ORAL at 17:00

## 2019-02-01 RX ADMIN — GABAPENTIN 600 MG: 300 CAPSULE ORAL at 21:19

## 2019-02-01 RX ADMIN — PANTOPRAZOLE SODIUM 40 MG: 40 TABLET, DELAYED RELEASE ORAL at 06:48

## 2019-02-01 RX ADMIN — ACETAMINOPHEN 975 MG: 325 TABLET ORAL at 02:08

## 2019-02-01 RX ADMIN — LORAZEPAM 0.25 MG: 0.5 TABLET ORAL at 08:48

## 2019-02-01 RX ADMIN — NICOTINE 1 PATCH: 21 PATCH, EXTENDED RELEASE TRANSDERMAL at 08:51

## 2019-02-01 RX ADMIN — GABAPENTIN 600 MG: 300 CAPSULE ORAL at 16:59

## 2019-02-01 RX ADMIN — RISPERIDONE 3 MG: 2 TABLET ORAL at 21:19

## 2019-02-01 RX ADMIN — HYDROXYZINE HYDROCHLORIDE 25 MG: 25 TABLET ORAL at 23:36

## 2019-02-01 RX ADMIN — HYDROXYZINE HYDROCHLORIDE 25 MG: 25 TABLET ORAL at 17:01

## 2019-02-01 RX ADMIN — GABAPENTIN 600 MG: 300 CAPSULE ORAL at 08:48

## 2019-02-01 RX ADMIN — HYDROXYZINE HYDROCHLORIDE 50 MG: 25 TABLET ORAL at 08:48

## 2019-02-01 RX ADMIN — ALBUTEROL SULFATE 2 PUFF: 90 AEROSOL, METERED RESPIRATORY (INHALATION) at 03:41

## 2019-02-01 RX ADMIN — DOCUSATE SODIUM 100 MG: 100 CAPSULE, LIQUID FILLED ORAL at 08:48

## 2019-02-01 RX ADMIN — LORAZEPAM 0.25 MG: 0.5 TABLET ORAL at 12:12

## 2019-02-01 RX ADMIN — BENZTROPINE MESYLATE 1 MG: 1 TABLET ORAL at 08:48

## 2019-02-01 RX ADMIN — RISPERIDONE 1 MG: 1 TABLET ORAL at 08:49

## 2019-02-01 RX ADMIN — HYDROXYZINE HYDROCHLORIDE 50 MG: 25 TABLET ORAL at 21:19

## 2019-02-01 RX ADMIN — RISPERIDONE 1 MG: 1 TABLET ORAL at 17:01

## 2019-02-01 NOTE — PLAN OF CARE
Problem: DISCHARGE PLANNING - CARE MANAGEMENT  Goal: Discharge to post-acute care or home with appropriate resources  INTERVENTIONS:  - Conduct assessment to determine patient/family and health care team treatment goals, and need for post-acute services based on payer coverage, community resources, and patient preferences, and barriers to discharge  - Address psychosocial, clinical, and financial barriers to discharge as identified in assessment in conjunction with the patient/family and health care team  - Arrange appropriate level of post-acute services according to patient's   needs and preference and payer coverage in collaboration with the physician and health care team  - Communicate with and update the patient/family, physician, and health care team regarding progress on the discharge plan  - Arrange appropriate transportation to post-acute venues   Outcome: Adequate for Discharge  Pt now wanting to leave Monday and unsure about inpatient rehab

## 2019-02-01 NOTE — PROGRESS NOTES
Pt slept from approx 2030 until 0300hrs  Only one outburst of yelling and cursing in her room and was redirectable  Pt has been walking the halls to pass time  Pt reports that her back is feeling better since she started her stretching exercises that PT had taught her  Pt now requesting back support brace/band from her property  Will review in report  0208 pt had complaint of 8/10 headache PRN tylenol administered  See MAR  Medication effective  Able to make needs known  Will continue to monitor

## 2019-02-01 NOTE — PROGRESS NOTES
Progress Note - Behavioral Health     Kurt Shah 53 y o  female MRN: [id number]   Unit/Bed#: [facility] Encounter: [id number]    Behavior over the last 24 hours:  Roque Kirkland was seen for an inpatient follow-up psychiatric visit this date  She relates she is doing well and denies any suicidal or homicidal ideation  She also denies any auditory or visual hallucinations at this time  Appetite and sleep are within normal limits  She is taking her medications as prescribed without side effects and feels that they are affective  She wishes to be discharged Monday  ROS: no complaints    Mental Status Evaluation:    Appearance:  casually dressed, dressed appropriately   Behavior:  pleasant   Speech:  normal rate and volume   Mood:  normal   Affect:  normal range and intensity   Thought Process:  organized, logical, coherent   Associations: intact associations   Thought Content:  normal, no overt delusions   Perceptual Disturbances: none   Risk Potential: Suicidal ideation - None  Homicidal ideation - None  Potential for aggression - No   Sensorium:  oriented to person, place and time/date   Memory:  recent and remote memory grossly intact   Consciousness:  alert and awake   Attention: decreased concentration and decreased attention span   Insight:  improving   Judgment: improving   Gait/Station: normal gait/station and normal balance   Motor Activity: no abnormal movements     Vital signs in last 24 hours:    Temp:  [97 5 °F (36 4 °C)-98 6 °F (37 °C)] 97 5 °F (36 4 °C)  HR:  [73-88] 73  Resp:  [16-18] 16  BP: (134-154)/(76-90) 154/76    Laboratory results:  I have personally reviewed all pertinent laboratory/tests results      Progress Toward Goals: progressing    Assessment/Plan   Principal Problem:    Schizoaffective disorder, bipolar type (HCC)  Active Problems:    Uncomplicated alcohol dependence (HCC)    LYNN (generalized anxiety disorder)    Post-traumatic stress disorder, chronic    Recommended Treatment:   Continue current medications as prescribed  Plan is for discharge Monday pending any changes in patient's status  All current active medications have been reviewed  Encourage group therapy, milieu therapy and occupational therapy  [address] checks every 7 minutes      Current Facility-Administered Medications:  acetaminophen 650 mg Oral Q6H PRN Tiffany Harkins MD   acetaminophen 650 mg Oral Q4H PRN Tiffany Harkins MD   acetaminophen 975 mg Oral Q6H PRN Tiffany Harkins MD   albuterol 2 puff Inhalation Q4H PRN Felisha Aguilar MD   aluminum-magnesium hydroxide-simethicone 30 mL Oral Q4H PRN Tiffany Harkins MD   benztropine 1 mg Intramuscular Q6H PRN Tiffany Harkins MD   benztropine 1 mg Oral Q6H PRN Tiffany Harkins MD   docusate sodium 100 mg Oral BID Isis Lama, CRNP   gabapentin 600 mg Oral TID Isis Lama, CRNP   hydrOXYzine HCL 25 mg Oral BID Isis Lama, CRNP   hydrOXYzine HCL 25 mg Oral HS PRN Isis Lama, CRNP   hydrOXYzine HCL 50 mg Oral HS Isis Lama, CRNP   LORazepam 0 25 mg Oral TID Collin Urrutia MD   magnesium hydroxide 30 mL Oral Daily PRN Tiffany Harkins MD   nicotine 1 patch Transdermal Daily Tiffany Harkins MD   OLANZapine 5 mg Intramuscular Q3H PRN Tiffany Harkins MD   OLANZapine 5 mg Oral Q3H PRN Tiffany Harkins MD   pantoprazole 40 mg Oral Early Morning Felisha Aguilar MD   pramoxine-phenylephrine-glycerin-petrolatum  Rectal BID PRN Shane Lama, CRNP   risperiDONE 1 mg Oral BID Collin Urrutia MD   risperiDONE 3 mg Oral HS Collin Urrutia MD   traZODone 50 mg Oral HS PRN Tiffany Harkins MD       Risks / Benefits of Treatment:    Risks, benefits, and possible side effects of medications explained to patient and patient verbalizes understanding and agreement for treatment  Counseling / Coordination of Care:      Patient's progress discussed with staff in treatment team meeting  Medications, treatment progress and treatment plan reviewed with patient

## 2019-02-01 NOTE — PROGRESS NOTES
Pt calm and cooperative with staff  Mood is labile but has been controlled this shift  Pt refusing cogentin stating "I don't take that it reacts with my risperidone" Paranoid thinking and reports of hallucinations that come and go  Pt states, "jose been unstable so im paranoid of people poisoning my foods" Pt requesting double portions and yogurt with meals  Reports constipation  Prune juice provided  Declined MOM  Will monitor effectiveness

## 2019-02-01 NOTE — PROGRESS NOTES
Pt has been withdrawn to her room since the beginning of the shift  Pt states she does not like being around others because there is too much noise  Pt requested her night times meds several times earlier but could not be given because it was too early  Pt understood  Pt denies SI/HI/AH/VH at this time  Able to make needs known    Will continue to monitor

## 2019-02-02 LAB
EST. AVERAGE GLUCOSE BLD GHB EST-MCNC: 94 MG/DL
HBA1C MFR BLD: 4.9 % (ref 4.2–6.3)
HCG SERPL QL: NEGATIVE

## 2019-02-02 PROCEDURE — 99231 SBSQ HOSP IP/OBS SF/LOW 25: CPT | Performed by: NURSE PRACTITIONER

## 2019-02-02 PROCEDURE — 83036 HEMOGLOBIN GLYCOSYLATED A1C: CPT | Performed by: PSYCHIATRY & NEUROLOGY

## 2019-02-02 PROCEDURE — 84703 CHORIONIC GONADOTROPIN ASSAY: CPT | Performed by: PSYCHIATRY & NEUROLOGY

## 2019-02-02 RX ADMIN — HYDROXYZINE HYDROCHLORIDE 50 MG: 25 TABLET ORAL at 21:18

## 2019-02-02 RX ADMIN — PANTOPRAZOLE SODIUM 40 MG: 40 TABLET, DELAYED RELEASE ORAL at 05:58

## 2019-02-02 RX ADMIN — RISPERIDONE 3 MG: 2 TABLET ORAL at 21:18

## 2019-02-02 RX ADMIN — RISPERIDONE 1 MG: 1 TABLET ORAL at 08:03

## 2019-02-02 RX ADMIN — ACETAMINOPHEN 975 MG: 325 TABLET ORAL at 09:18

## 2019-02-02 RX ADMIN — GABAPENTIN 600 MG: 300 CAPSULE ORAL at 08:02

## 2019-02-02 RX ADMIN — RISPERIDONE 1 MG: 1 TABLET ORAL at 17:28

## 2019-02-02 RX ADMIN — LORAZEPAM 0.25 MG: 0.5 TABLET ORAL at 08:02

## 2019-02-02 RX ADMIN — DOCUSATE SODIUM 100 MG: 100 CAPSULE, LIQUID FILLED ORAL at 08:02

## 2019-02-02 RX ADMIN — DOCUSATE SODIUM 100 MG: 100 CAPSULE, LIQUID FILLED ORAL at 17:29

## 2019-02-02 RX ADMIN — HYDROXYZINE HYDROCHLORIDE 25 MG: 25 TABLET ORAL at 17:28

## 2019-02-02 RX ADMIN — GABAPENTIN 600 MG: 300 CAPSULE ORAL at 17:00

## 2019-02-02 RX ADMIN — NICOTINE 1 PATCH: 21 PATCH, EXTENDED RELEASE TRANSDERMAL at 09:19

## 2019-02-02 RX ADMIN — HYDROXYZINE HYDROCHLORIDE 25 MG: 25 TABLET ORAL at 08:02

## 2019-02-02 RX ADMIN — ACETAMINOPHEN 975 MG: 325 TABLET ORAL at 02:42

## 2019-02-02 RX ADMIN — LORAZEPAM 0.25 MG: 0.5 TABLET ORAL at 17:28

## 2019-02-02 RX ADMIN — GABAPENTIN 600 MG: 300 CAPSULE ORAL at 21:17

## 2019-02-02 RX ADMIN — LORAZEPAM 0.25 MG: 0.5 TABLET ORAL at 12:23

## 2019-02-02 NOTE — PROGRESS NOTES
Pt is visible on unit  Mood is lab ile but controlled  Periods of high anxiety  Complaints of nightmares and poor sleep  Paranoid thoughts  Pt fixated on atarax and doesn't want to be discharged on "so many medications" contemplating discontinuing medication  Spoke at length with patient about current medication regimen pt receptive to teaching and compliant with medications as prescribed  Partial participation in groups and unit activities  Pt reports colace and prune juice were effective  No further complaints at this time  Able to let needs known  Will continue to monitor and assess  Safety precautions maintained

## 2019-02-02 NOTE — PROGRESS NOTES
Pt has been withdrawn to her room since the beginning of the shift  Encouraged pt out of her room and to go to group  Pt states, "I'm crazy enough,  I don't want to hear about other peoples stuff"  Pt currently denies SI/HI, states I'll let you known about the hallucinations later  Pt is hyperverbal and pressured, restless  Able to make needs known  Will continue to monitor

## 2019-02-02 NOTE — PROGRESS NOTES
Progress Note - Behavioral Health     Thalia Campos 53 y o  female MRN: [id number]   Unit/Bed#: [facility] [place] [facility]-02 Encounter: [id number]    Behavior over the last 24 hours:  Harris was seen for an inpatient follow-up psychiatric visit this date  Per nursing report, she has been withdrawn to her room but has had no behavioral outbursts on the unit  She is taking her medications as prescribed without any side effects  Lorazepam taper continues and is down to 0 25 mg t i d   At today's visit, she relates she did not sleep well last night but states her appetite is within normal limits  She denies any suicidal or homicidal ideation as well as any auditory or visual hallucinations  No delusional content noted in interview this date  She states she wishes to be discharged as soon as possible  ROS: no complaints    Mental Status Evaluation:    Appearance:  casually dressed, dressed appropriately   Behavior:  normal, pleasant, cooperative   Speech:  normal rate and volume   Mood:  euthymic   Affect:  normal range and intensity   Thought Process:  organized, coherent   Associations: intact associations   Thought Content:  no overt delusions   Perceptual Disturbances: denies auditory hallucinations when asked   Risk Potential: Suicidal ideation - None  Homicidal ideation - None  Potential for aggression - No   Sensorium:  oriented to person, place and time/date   Memory:  recent and remote memory grossly intact   Consciousness:  alert and awake   Attention: attention span and concentration are age appropriate   Insight:  fair   Judgment: fair   Gait/Station: normal gait/station and normal balance   Motor Activity: no abnormal movements     Vital signs in last 24 hours:    Temp:  [97 9 °F (36 6 °C)-98 4 °F (36 9 °C)] 97 9 °F (36 6 °C)  HR:  [60-74] 60  Resp:  [16] 16  BP: (104-120)/(66-70) 104/66    Laboratory results:  I have personally reviewed all pertinent laboratory/tests results      Progress Toward Goals: progressing    Assessment/Plan   Principal Problem:    Schizoaffective disorder, bipolar type (HCC)  Active Problems:    Uncomplicated alcohol dependence (HCC)    LYNN (generalized anxiety disorder)    Post-traumatic stress disorder, chronic    Recommended Treatment:   Will continue current medication as prescribed  Plan is for discharge early next week pending any change in patient's status  All current active medications have been reviewed    Encourage group therapy, milieu therapy and occupational therapy  [address] checks every 7 minutes      Current Facility-Administered Medications:  acetaminophen 650 mg Oral Q6H PRN Violet Hooper MD   acetaminophen 650 mg Oral Q4H PRN Violet Hooper, MD   acetaminophen 975 mg Oral Q6H PRN Violet Hooper MD   albuterol 2 puff Inhalation Q4H PRN Roslyn Hernández MD   aluminum-magnesium hydroxide-simethicone 30 mL Oral Q4H PRN Violet Hooper MD   benztropine 1 mg Intramuscular Q6H PRN Violet Hooper MD   benztropine 1 mg Oral Q6H PRN Violet Hooper, MD   docusate sodium 100 mg Oral BID Isis Lama, CRNP   gabapentin 600 mg Oral TID Isis Lama, CRNP   hydrOXYzine HCL 25 mg Oral BID Isis Lama, CRNP   hydrOXYzine HCL 25 mg Oral HS PRN Isis Lama, CRNP   hydrOXYzine HCL 50 mg Oral HS Isis Lama, CRNP   LORazepam 0 25 mg Oral TID Huyen Hartley MD   magnesium hydroxide 30 mL Oral Daily PRN Violet Hooper MD   nicotine 1 patch Transdermal Daily Violet Hooper MD   OLANZapine 5 mg Intramuscular Q3H PRN Violet Hooper MD   OLANZapine 5 mg Oral Q3H PRN Violet Hooper MD   pantoprazole 40 mg Oral Early Morning Roslyn Hernández MD   pramoxine-phenylephrine-glycerin-petrolatum  Rectal BID PRN Caron Lama, CRNP   risperiDONE 1 mg Oral BID Huyen Hartley MD   risperiDONE 3 mg Oral HS Huyen Hartley MD   traZODone 50 mg Oral HS PRN Violet Hooper MD       Risks / Benefits of Treatment:    Risks, benefits, and possible side effects of medications explained to patient and patient verbalizes understanding and agreement for treatment  Counseling / Coordination of Care:      Patient's progress reviewed with nursing staff  Medications, treatment progress and treatment plan reviewed with patient

## 2019-02-02 NOTE — PLAN OF CARE
SELF HARM/SUICIDALITY     Will have no self-injury during hospital stay Adequate for Discharge          SLEEP DISTURBANCE     Will exhibit normal sleeping pattern Not Progressing          ANXIETY     Will report anxiety at manageable levels Progressing     By discharge: Patient will verbalize 2 strategies to deal with anxiety Progressing        Ineffective Coping     Participates in unit activities Progressing        Nutrition/Hydration-ADULT     Nutrient/Hydration intake appropriate for improving, restoring or maintaining nutritional needs Progressing        PSYCHOSIS     Will report no hallucinations or delusions Progressing        SUBSTANCE USE/ABUSE     Will have no detox symptoms and will verbalize plan for changing substance-related behavior Progressing     By discharge, will develop insight into their chemical dependency and sustain motivation to continue in recovery Progressing     By discharge, patient will have ongoing treatment plan addressing chemical dependency Progressing
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
## 2019-02-03 PROCEDURE — 99231 SBSQ HOSP IP/OBS SF/LOW 25: CPT | Performed by: NURSE PRACTITIONER

## 2019-02-03 RX ORDER — GABAPENTIN 300 MG/1
600 CAPSULE ORAL 3 TIMES DAILY
Qty: 180 CAPSULE | Refills: 0 | Status: SHIPPED | OUTPATIENT
Start: 2019-02-03 | End: 2019-11-07 | Stop reason: HOSPADM

## 2019-02-03 RX ORDER — RISPERIDONE 3 MG/1
3 TABLET, FILM COATED ORAL
Qty: 30 TABLET | Refills: 0 | Status: SHIPPED | OUTPATIENT
Start: 2019-02-03 | End: 2019-11-07 | Stop reason: HOSPADM

## 2019-02-03 RX ORDER — HYDROXYZINE 50 MG/1
50 TABLET, FILM COATED ORAL
Qty: 30 TABLET | Refills: 0 | Status: SHIPPED | OUTPATIENT
Start: 2019-02-03 | End: 2019-03-20 | Stop reason: ALTCHOICE

## 2019-02-03 RX ORDER — HYDROXYZINE HYDROCHLORIDE 25 MG/1
25 TABLET, FILM COATED ORAL 2 TIMES DAILY
Qty: 60 TABLET | Refills: 0 | Status: SHIPPED | OUTPATIENT
Start: 2019-02-03 | End: 2019-03-20 | Stop reason: ALTCHOICE

## 2019-02-03 RX ORDER — RISPERIDONE 1 MG/1
1 TABLET, FILM COATED ORAL 2 TIMES DAILY
Qty: 60 TABLET | Refills: 0 | Status: SHIPPED | OUTPATIENT
Start: 2019-02-03 | End: 2019-11-07 | Stop reason: HOSPADM

## 2019-02-03 RX ADMIN — DOCUSATE SODIUM 100 MG: 100 CAPSULE, LIQUID FILLED ORAL at 17:12

## 2019-02-03 RX ADMIN — LORAZEPAM 0.25 MG: 0.5 TABLET ORAL at 12:52

## 2019-02-03 RX ADMIN — GABAPENTIN 600 MG: 300 CAPSULE ORAL at 08:32

## 2019-02-03 RX ADMIN — LORAZEPAM 0.25 MG: 0.5 TABLET ORAL at 08:32

## 2019-02-03 RX ADMIN — PANTOPRAZOLE SODIUM 40 MG: 40 TABLET, DELAYED RELEASE ORAL at 06:00

## 2019-02-03 RX ADMIN — HYDROXYZINE HYDROCHLORIDE 50 MG: 25 TABLET ORAL at 20:28

## 2019-02-03 RX ADMIN — MAGNESIUM HYDROXIDE 30 ML: 400 SUSPENSION ORAL at 13:43

## 2019-02-03 RX ADMIN — GABAPENTIN 600 MG: 300 CAPSULE ORAL at 16:49

## 2019-02-03 RX ADMIN — RISPERIDONE 1 MG: 1 TABLET ORAL at 17:11

## 2019-02-03 RX ADMIN — HYDROXYZINE HYDROCHLORIDE 25 MG: 25 TABLET ORAL at 08:33

## 2019-02-03 RX ADMIN — RISPERIDONE 1 MG: 1 TABLET ORAL at 08:33

## 2019-02-03 RX ADMIN — GABAPENTIN 600 MG: 300 CAPSULE ORAL at 20:28

## 2019-02-03 RX ADMIN — NICOTINE 1 PATCH: 21 PATCH, EXTENDED RELEASE TRANSDERMAL at 08:31

## 2019-02-03 RX ADMIN — LORAZEPAM 0.25 MG: 0.5 TABLET ORAL at 17:11

## 2019-02-03 RX ADMIN — HYDROXYZINE HYDROCHLORIDE 25 MG: 25 TABLET ORAL at 17:11

## 2019-02-03 RX ADMIN — RISPERIDONE 3 MG: 2 TABLET ORAL at 20:28

## 2019-02-03 RX ADMIN — DOCUSATE SODIUM 100 MG: 100 CAPSULE, LIQUID FILLED ORAL at 08:32

## 2019-02-03 NOTE — PROGRESS NOTES
Pt is visible on unit  Pleasant and cooperative with staff  Selective social with peers  Pt walking halls as coping skill  Pt states, "Im stable and feel ready to go" Affect flat  Mood appropriate  No agitation or irritability noted this shift  Administered MOM for constipation  Pt denies SI, HI or hallucinations  Slight paranoia  Able to communicate needs  Verbalizes her daughter is a good support and had a good conversation on telephone this afternoon  Safety precautions maintained  Maintained  Will continue to monitor and assess

## 2019-02-03 NOTE — PLAN OF CARE
SELF HARM/SUICIDALITY     Will have no self-injury during hospital stay Adequate for Discharge          ANXIETY     Will report anxiety at manageable levels Progressing     By discharge: Patient will verbalize 2 strategies to deal with anxiety Progressing        DISCHARGE PLANNING - CARE MANAGEMENT     Discharge to post-acute care or home with appropriate resources Progressing        Ineffective Coping     Participates in unit activities Progressing        PSYCHOSIS     Will report no hallucinations or delusions Progressing        SLEEP DISTURBANCE     Will exhibit normal sleeping pattern Progressing        SUBSTANCE USE/ABUSE     Will have no detox symptoms and will verbalize plan for changing substance-related behavior Progressing     By discharge, will develop insight into their chemical dependency and sustain motivation to continue in recovery Progressing     By discharge, patient will have ongoing treatment plan addressing chemical dependency Progressing

## 2019-02-03 NOTE — PROGRESS NOTES
Patient spent most of evening shift withdrawn to her room  Attended evening snack and then returned to her room  Denies s/i  Pleasant and cooperative with meds  Did not require any prns

## 2019-02-03 NOTE — PROGRESS NOTES
Progress Note - [address] Lamb 53 y o  female MRN: [id number]   Unit/Bed#: [facility] 254-02 Encounter: [id number]    Behavior over the last 24 hours:  Lucila was seen for an inpatient follow-up psychiatric visit this date  Per nursing report, she has been compliant with her medications  She denies any side effects  Appetite within normal limits  She still does complain of nightmares occasionally but at today's visit relates she slept through the whole night without difficulty last night  She relates her mood has improved and she denies any suicidal or homicidal ideation as well as any auditory or visual hallucinations at this time  She is looking forward to being discharged  She has had no behavioral incidents on the unit over the past 24 hours  She is attending group and participating at times  ROS: no complaints    Mental Status Evaluation:    Appearance:  casually dressed, dressed appropriately   Behavior:  pleasant, cooperative   Speech:  normal rate and volume   Mood:  improved   Affect:  normal range and intensity   Thought Process:  logical, coherent   Associations: intact associations   Thought Content:  normal, no overt delusions   Perceptual Disturbances: none   Risk Potential: Suicidal ideation - None  Homicidal ideation - None  Potential for aggression - Yes   Sensorium:  oriented to person, place and time/date   Memory:  recent and remote memory grossly intact   Consciousness:  alert and awake   Attention: decreased concentration and decreased attention span   Insight:  fair   Judgment: fair   Gait/Station: normal gait/station and normal balance   Motor Activity: no abnormal movements     Vital signs in last 24 hours:    Temp:  [97 2 °F (36 2 °C)-97 5 °F (36 4 °C)] 97 2 °F (36 2 °C)  HR:  [] 111  Resp:  [16] 16  BP: (108-125)/(55-73) 108/55    Laboratory results:  I have personally reviewed all pertinent laboratory/tests results      Progress Toward Goals: progressing    Assessment/Plan   Principal Problem:    Schizoaffective disorder, bipolar type (HCC)  Active Problems:    Uncomplicated alcohol dependence (HCC)    LYNN (generalized anxiety disorder)    Post-traumatic stress disorder, chronic    Recommended Treatment:   1  Continue current medications as prescribed  2  Plan is for discharge tomorrow pending any change in patient's status  All current active medications have been reviewed    Encourage group therapy, milieu therapy and occupational therapy  [address] checks every 7 minutes      Current Facility-Administered Medications:  acetaminophen 650 mg Oral Q6H PRN Huy Roman MD   acetaminophen 650 mg Oral Q4H PRN Huy Roman MD   acetaminophen 975 mg Oral Q6H PRN Huy Roman MD   albuterol 2 puff Inhalation Q4H PRN Duglas Rawls MD   aluminum-magnesium hydroxide-simethicone 30 mL Oral Q4H PRN Huy Roman MD   benztropine 1 mg Intramuscular Q6H PRN Huy Roman MD   benztropine 1 mg Oral Q6H PRN Huy Roman MD   docusate sodium 100 mg Oral BID Isis Lama, CRNP   gabapentin 600 mg Oral TID Isis Lama, CRNP   hydrOXYzine HCL 25 mg Oral BID Isis Lama, CRNP   hydrOXYzine HCL 25 mg Oral HS PRN Isis Lama, CRNP   hydrOXYzine HCL 50 mg Oral HS Isis Lama, CRNP   LORazepam 0 25 mg Oral TID Junior Ahumada, MD   magnesium hydroxide 30 mL Oral Daily PRN Huy Roman MD   nicotine 1 patch Transdermal Daily Huy Roman MD   OLANZapine 5 mg Intramuscular Q3H PRN Huy Roman MD   OLANZapine 5 mg Oral Q3H PRN Huy Roman MD   pantoprazole 40 mg Oral Early Morning Duglas Rawls MD   pramoxine-phenylephrine-glycerin-petrolatum  Rectal BID PRN JARED Leigh   risperiDONE 1 mg Oral BID Junior Ahumada, MD   risperiDONE 3 mg Oral HS Junior Ahumada, MD   traZODone 50 mg Oral HS PRN Huy Roman MD       Risks / Benefits of Treatment:    Risks, benefits, and possible side effects of medications explained to patient and patient verbalizes understanding and agreement for treatment  Counseling / Coordination of Care:      Patient's progress reviewed with nursing staff  Medications, treatment progress and treatment plan reviewed with patient

## 2019-02-04 VITALS
RESPIRATION RATE: 16 BRPM | HEIGHT: 61 IN | DIASTOLIC BLOOD PRESSURE: 64 MMHG | HEART RATE: 95 BPM | OXYGEN SATURATION: 95 % | TEMPERATURE: 97.6 F | BODY MASS INDEX: 22.66 KG/M2 | SYSTOLIC BLOOD PRESSURE: 119 MMHG | WEIGHT: 120 LBS

## 2019-02-04 PROCEDURE — 99239 HOSP IP/OBS DSCHRG MGMT >30: CPT | Performed by: NURSE PRACTITIONER

## 2019-02-04 RX ADMIN — NICOTINE 1 PATCH: 21 PATCH, EXTENDED RELEASE TRANSDERMAL at 08:11

## 2019-02-04 RX ADMIN — LORAZEPAM 0.25 MG: 0.5 TABLET ORAL at 12:36

## 2019-02-04 RX ADMIN — GABAPENTIN 600 MG: 300 CAPSULE ORAL at 08:11

## 2019-02-04 RX ADMIN — HYDROXYZINE HYDROCHLORIDE 25 MG: 25 TABLET ORAL at 08:11

## 2019-02-04 RX ADMIN — LORAZEPAM 0.25 MG: 0.5 TABLET ORAL at 08:11

## 2019-02-04 RX ADMIN — PANTOPRAZOLE SODIUM 40 MG: 40 TABLET, DELAYED RELEASE ORAL at 04:48

## 2019-02-04 RX ADMIN — DOCUSATE SODIUM 100 MG: 100 CAPSULE, LIQUID FILLED ORAL at 08:11

## 2019-02-04 RX ADMIN — RISPERIDONE 1 MG: 1 TABLET ORAL at 08:11

## 2019-02-04 NOTE — DISCHARGE INSTR - OTHER ORDERS
You will discharge to your friends home at [facility], [address] [place] [address] Phone: [phone]  Crisis Plan:    Triggers you identified during your hospital stay that led to suicidal ideations, increased paranoid delusions, and auditory hallucinations include: housing stressors, chronic mental health, and alcohol abuse  You were unable to identify any coping skills during your hospital stay  We recommend trying coloring, listening to music, and going for walks  After discharge, if you find your coping skills are not effective and you continue to feel distressed please reach out to your ACT team     If that is not effective, and you feel you are a danger to yourself or others please contact [address] [place] [address]: [phone], [address] [place] [address]:  [phone], Peer Support Talk Line (Seven days a week, 1:00 PM  9:00 PM) Call: [phone] or Text: [phone], Alcohol Anonymous: 448.598.2774, [place] [facility]: [phone], Jorge on [address] [facility] ([facility]) [employer]INE: [phone]/Email: www mariana  org, or Substance Abuse and Mental Health Services Administration([facility]) [employer], which is a confidential, free, 24-hour-a-day, 365-day-a-year, information service for individuals and family members facing mental health and/or substance use disorders  This service provides referrals to local treatment facilities, support groups, and community-based organizations  Callers can also order free publications and other information    Call 4-112.281.9559/XGKFR: www [facility] gov

## 2019-02-04 NOTE — PROGRESS NOTES
Discharge instructions were reviewed with patient, pt demonstrated understanding at this time  Pt denies any issues and is awaiting pickup for d/c

## 2019-02-04 NOTE — DISCHARGE INSTRUCTIONS
Hydroxyzine (By mouth)   Hydroxyzine Pamoate (cjb-GVGX-a-zeen CRISTOFER-oh-ate)  Treats anxiety, nausea, vomiting, allergies, skin rash, hives, and itching  May also be used with anesthesia for medical procedures  Brand Name(s): Vistaril   There may be other brand names for this medicine  When This Medicine Should Not Be Used: This medicine is not right for everyone  Do not use it if you had an allergic reaction to hydroxyzine, cetirizine, or levocetirizine  Do not use it during the early part of pregnancy or if you have prolonged QT interval   How to Use This Medicine:   Capsule, Liquid, Tablet  · Your doctor will tell you how much medicine to use  Do not use more than directed  · Oral liquid: Measure the oral liquid medicine with a marked measuring spoon, oral syringe, or medicine cup  Shake well just before use  · Tablet: Swallow whole  Do not break, crush, or chew it  · Missed dose: Take a dose as soon as you remember  If it is almost time for your next dose, wait until then and take a regular dose  Do not take extra medicine to make up for a missed dose  · Store the medicine in a closed container at room temperature, away from heat, moisture, and direct light  Drugs and Foods to Avoid:   Ask your doctor or pharmacist before using any other medicine, including over-the-counter medicines, vitamins, and herbal products  · Some medicines can affect how hydroxyzine works   Tell your doctor if you are using any of the following:  ¨ Droperidol, methadone, ondansetron, pentamidine  ¨ Antibiotic (including azithromycin, clarithromycin, erythromycin, gatifloxacin, moxifloxacin)  ¨ Medicine to treat depression (including citalopram, fluoxetine)  ¨ Medicine to treat heart rhythm problems (including amiodarone, procainamide, quinidine, sotalol)  ¨ Medicine to treat mental health problems (including chlorpromazine, clozapine, iloperidone, quetiapine, ziprasidone)  · Tell your doctor if you use anything else that makes you sleepy  Some examples are allergy medicine, narcotic pain medicine, and alcohol  Warnings While Using This Medicine:   · Tell your doctor if you are pregnant or breastfeeding, or if you have heart disease, heart failure, a slow heartbeat, skin problems, or had a recent heart attack  · This medicine may cause the following problems:  ¨ Changes in your heart rhythm  ¨ Serious skin reaction called acute generalized exanthematous pustulosis (AGEP)  · This medicine may make you drowsy  Do not drive or do anything that could be dangerous until you know how this medicine affects you  · Call your doctor if your symptoms do not improve or if they get worse  · Keep all medicine out of the reach of children  Never share your medicine with anyone  Possible Side Effects While Using This Medicine:   Call your doctor right away if you notice any of these side effects:  · Allergic reaction: Itching or hives, swelling in your face or hands, swelling or tingling in your mouth or throat, chest tightness, trouble breathing  · Fainting, dizziness, lightheadedness  · Fast, pounding, or uneven heartbeat  · Fever, skin rash  · Severe tiredness  If you notice these less serious side effects, talk with your doctor:   · Drowsiness, sleepiness  · Dry mouth  If you notice other side effects that you think are caused by this medicine, tell your doctor  Call your doctor for medical advice about side effects  You may report side effects to FDA at 0-649-FDA-7515  © 2017 [address] Information is for End User's use only and may not be sold, redistributed or otherwise used for commercial purposes  The above information is an  only  It is not intended as medical advice for individual conditions or treatments  Talk to your doctor, nurse or pharmacist before following any medical regimen to see if it is safe and effective for you        Risperidone (By mouth)   Risperidone (ris-PER-i-done)  Treats schizophrenia, bipolar disorder, and irritability caused by autistic disorder  Brand Name(s): RisperDAL, RisperDAL M-Tab, risperiDONE M-Tab   There may be other brand names for this medicine  When This Medicine Should Not Be Used: This medicine is not right for everyone  Do not use it if you had an allergic reaction to risperidone or paliperidone  How to Use This Medicine:   Liquid, Tablet, Dissolving Tablet  · Take your medicine as directed  Your dose may need to be changed several times to find what works best for you  · Measure the oral liquid medicine with a marked measuring spoon, oral syringe, or medicine cup  You may mix your dose with water, coffee, low-fat milk, or orange juice  Do not mix it with cola or tea  · Make sure your hands are dry before you handle the disintegrating tablet  Peel back the foil from the blister pack, then remove the tablet  Do not push the tablet through the foil  Place the tablet in your mouth  After it has melted, swallow or take a drink of water  · Missed dose: Take a dose as soon as you remember  If it is almost time for your next dose, wait until then and take a regular dose  Do not take extra medicine to make up for a missed dose  · Store the medicine in a closed container at room temperature, away from heat, moisture, and direct light  Do not freeze the oral liquid  Drugs and Foods to Avoid:   Ask your doctor or pharmacist before using any other medicine, including over-the-counter medicines, vitamins, and herbal products  · Some medicines can affect how risperidone works  Tell your doctor if you are using carbamazepine, clozapine, fluoxetine, furosemide, levodopa, paroxetine, phenobarbital, phenytoin, quinidine, rifampin, or blood pressure medicine  · Do not drink alcohol while you are using this medicine  · Tell your doctor if you use anything else that makes you sleepy  Some examples are allergy medicine, narcotic pain medicine, and alcohol    Warnings While Using This Medicine:   · Tell your doctor if you are pregnant or breastfeeding, or if you have kidney disease, liver disease, diabetes, high cholesterol, Parkinson disease, trouble swallowing, or a history of breast cancer or seizures  Tell your doctor if you have heart failure, low blood pressure, or a history of a heart attack or stroke  · This medicine may cause the following problems:  ¨ Increased risk of stroke  ¨ Neuroleptic malignant syndrome (a nerve disorder that could be life-threatening)  ¨ Tardive dyskinesia (a muscle disorder that could become permanent)  ¨ High blood sugar levels or high cholesterol levels  ¨ Increased levels of prolactin hormone  · This medicine may make you dizzy, drowsy, or have trouble with thinking or controlling body movements, which may lead to falls, fractures or other injuries  Do not drive or do anything else that could be dangerous until you know how this medicine affects you  · This medicine may change how your body regulates temperature  Avoid activities that could cause you to become very cold, hot, or dehydrated  · This medicine may make you bleed, bruise, or get infections more easily  Take precautions to prevent illness and injury  Wash your hands often  · Risperdal® M-Tab® contains aspartame (phenylalanine)  If you have phenylketonuria (PKU), talk to your doctor before using this medicine  · Your doctor will do lab tests at regular visits to check on the effects of this medicine  Keep all appointments  · Keep all medicine out of the reach of children  Never share your medicine with anyone    Possible Side Effects While Using This Medicine:   Call your doctor right away if you notice any of these side effects:  · Allergic reaction: Itching or hives, swelling in your face or hands, swelling or tingling in your mouth or throat, chest tightness, trouble breathing  · Fast, slow, pounding, or uneven heartbeat  · Fever, sweating, confusion, or muscle stiffness  · Increased hunger or thirst, change in how much or how often you urinate  · Jerky muscle movements you cannot control (often in your face, tongue, or jaw)  · Lightheadedness, dizziness, or fainting  · Numbness or weakness on one side of your body, sudden or severe headache, problems with vision, speech, or walking  · Painful, prolonged erection  · Seizures or tremors  · Swelling of the breasts, breast soreness, nipple discharge (in both women and men)  · Trouble swallowing  If you notice these less serious side effects, talk with your doctor:   · Constipation, diarrhea, nausea, or upset stomach  · Drooling  · Drowsiness or trouble sleeping  · Weight gain  If you notice other side effects that you think are caused by this medicine, tell your doctor  Call your doctor for medical advice about side effects  You may report side effects to FDA at 8-854-FDA-5243  © 2017 [address] Information is for End User's use only and may not be sold, redistributed or otherwise used for commercial purposes  The above information is an  only  It is not intended as medical advice for individual conditions or treatments  Talk to your doctor, nurse or pharmacist before following any medical regimen to see if it is safe and effective for you
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
## 2019-02-04 NOTE — PROGRESS NOTES
Patient has been withdrawn to her room for all of evening shift  [facility] staff reported that she was heard crying in her room  When [facility] staff checked on her, she stated that she was hearing voices coming from the vents  This writer went to assess patient shortly thereafter and she appeared fine  Asked patient if she had been upset  She said "it was nothing  It wasn't real "  Pressed patient a little on whether she was still feeling ready to go and she indicates she does feel ready and she is "stable "  Did not want any prns  Very pleasant during interaction

## 2019-02-04 NOTE — SOCIAL WORK
SETH left message for Abraham Dwyer on pt's ACT team informing her of pt's discharge today  SETH explained that pt has a ride home  SETH requested a return phone call

## 2019-02-04 NOTE — SOCIAL WORK
SW met with pt to discuss discharge for today  Pt provided SW with new address and phone number  Pt reports she has a ride but states that she thinks his phone is off as he hasn't picked up at all today; she is hoping he still shows up at 1PM to pick her up  Pt states that she feels safe for discharge  She denies SI/AH/VH/thoughts of self harm  SW spoke to pt's mother, Terra Rebolledo  She is disappointed that pt is leaving and is no longer going to rehab  She feels that pt will start drinking as soon as she leaves and will end up in the same position again  She understands that this is out of her control  She also made it clear that she is working today and can't pick pt up

## 2019-02-04 NOTE — DISCHARGE INSTR - APPOINTMENTS
The treatment team recommends that you continue with ACT services and inpatient substance abuse treatment  You retracted your request for inpatient rehab referrals  Please continue with [facility] ([address], [address], [place] [address] Phone: [phone] Fax: [phone]) for ACT services  A member of your ACT team will follow-up with your within 72 hours  We recommend that you see your ACT psychiatrist within 7 days of discharge  Your summary of care will be faxed to this provider  If you change your mind about drug and alcohol treatment, please contact [facility] ([address] [facility], [place], [place] Phone: [phone] Fax: [phone]) for a drug and alcohol evaluation and services  You can also contact the following facilities directly: [employer], [place]: [phone], [place] [facility]: [phone],  [address]: [phone], and [address]: [phone]  We recommend that you work on going to inpatient rehab as soon as possible  You identified your PCP as Dr Rodrigo Link  You indicated that you do not need an appointment scheduled on your behalf  Because you didn't sign a release of information your summary of care will not be faxed

## 2019-02-04 NOTE — PROGRESS NOTES
Pt has been visible on the unit, has been attending groups they are offered  Pt has been medication and meal compliant  Pt reports that she has been feeling better  Pt also expresses that she is excited to go today

## 2019-03-20 ENCOUNTER — HOSPITAL ENCOUNTER (EMERGENCY)
Facility: HOSPITAL | Age: 49
Discharge: LEFT BEFORE DISCHARGE | End: 2019-03-20
Payer: COMMERCIAL

## 2019-03-20 ENCOUNTER — OFFICE VISIT (OUTPATIENT)
Dept: FAMILY MEDICINE CLINIC | Facility: CLINIC | Age: 49
End: 2019-03-20
Payer: COMMERCIAL

## 2019-03-20 VITALS
HEIGHT: 60 IN | TEMPERATURE: 98 F | SYSTOLIC BLOOD PRESSURE: 156 MMHG | HEART RATE: 111 BPM | WEIGHT: 113 LBS | OXYGEN SATURATION: 98 % | RESPIRATION RATE: 22 BRPM | DIASTOLIC BLOOD PRESSURE: 105 MMHG | BODY MASS INDEX: 22.19 KG/M2

## 2019-03-20 VITALS
TEMPERATURE: 98.3 F | WEIGHT: 113.6 LBS | HEIGHT: 60 IN | DIASTOLIC BLOOD PRESSURE: 78 MMHG | SYSTOLIC BLOOD PRESSURE: 132 MMHG | BODY MASS INDEX: 22.3 KG/M2

## 2019-03-20 DIAGNOSIS — H65.03 BILATERAL ACUTE SEROUS OTITIS MEDIA, RECURRENCE NOT SPECIFIED: ICD-10-CM

## 2019-03-20 DIAGNOSIS — J01.01 ACUTE RECURRENT MAXILLARY SINUSITIS: Primary | ICD-10-CM

## 2019-03-20 DIAGNOSIS — F22 PARANOID BEHAVIOR (HCC): Primary | ICD-10-CM

## 2019-03-20 LAB
AMPHETAMINES SERPL QL SCN: NEGATIVE
BARBITURATES UR QL: NEGATIVE
BENZODIAZ UR QL: NEGATIVE
BILIRUB UR QL STRIP: NEGATIVE
CLARITY UR: CLEAR
COCAINE UR QL: NEGATIVE
COLOR UR: YELLOW
GLUCOSE UR STRIP-MCNC: NEGATIVE MG/DL
HGB UR QL STRIP.AUTO: NEGATIVE
KETONES UR STRIP-MCNC: NEGATIVE MG/DL
LEUKOCYTE ESTERASE UR QL STRIP: NEGATIVE
METHADONE UR QL: NEGATIVE
NITRITE UR QL STRIP: NEGATIVE
OPIATES UR QL SCN: NEGATIVE
PCP UR QL: NEGATIVE
PH UR STRIP.AUTO: 6.5 [PH]
PROT UR STRIP-MCNC: NEGATIVE MG/DL
SP GR UR STRIP.AUTO: <=1.005 (ref 1–1.03)
THC UR QL: NEGATIVE
UROBILINOGEN UR QL STRIP.AUTO: 0.2 E.U./DL

## 2019-03-20 PROCEDURE — 99213 OFFICE O/P EST LOW 20 MIN: CPT | Performed by: FAMILY MEDICINE

## 2019-03-20 PROCEDURE — 80307 DRUG TEST PRSMV CHEM ANLYZR: CPT

## 2019-03-20 PROCEDURE — 3008F BODY MASS INDEX DOCD: CPT | Performed by: FAMILY MEDICINE

## 2019-03-20 PROCEDURE — 99285 EMERGENCY DEPT VISIT HI MDM: CPT

## 2019-03-20 PROCEDURE — 81003 URINALYSIS AUTO W/O SCOPE: CPT

## 2019-03-20 RX ORDER — AMOXICILLIN 500 MG/1
500 CAPSULE ORAL EVERY 8 HOURS SCHEDULED
Qty: 30 CAPSULE | Refills: 0 | Status: SHIPPED | OUTPATIENT
Start: 2019-03-20 | End: 2019-03-20

## 2019-03-20 RX ORDER — LORAZEPAM 1 MG/1
2 TABLET ORAL ONCE
Status: COMPLETED | OUTPATIENT
Start: 2019-03-20 | End: 2019-03-20

## 2019-03-20 RX ORDER — LORAZEPAM 0.5 MG/1
TABLET ORAL 2 TIMES DAILY
COMMUNITY
End: 2019-11-07 | Stop reason: HOSPADM

## 2019-03-20 RX ADMIN — LORAZEPAM 2 MG: 1 TABLET ORAL at 14:58
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
## 2019-04-10 ENCOUNTER — TELEPHONE (OUTPATIENT)
Dept: FAMILY MEDICINE CLINIC | Facility: CLINIC | Age: 49
End: 2019-04-10

## 2019-04-15 ENCOUNTER — APPOINTMENT (EMERGENCY)
Dept: RADIOLOGY | Facility: HOSPITAL | Age: 49
End: 2019-04-15
Payer: COMMERCIAL

## 2019-04-15 ENCOUNTER — HOSPITAL ENCOUNTER (EMERGENCY)
Facility: HOSPITAL | Age: 49
Discharge: HOME | End: 2019-04-15
Attending: EMERGENCY MEDICINE
Payer: COMMERCIAL

## 2019-04-15 ENCOUNTER — APPOINTMENT (EMERGENCY)
Dept: CT IMAGING | Facility: HOSPITAL | Age: 49
End: 2019-04-15
Payer: COMMERCIAL

## 2019-04-15 VITALS
OXYGEN SATURATION: 96 % | DIASTOLIC BLOOD PRESSURE: 90 MMHG | WEIGHT: 115 LBS | HEIGHT: 60 IN | HEART RATE: 84 BPM | SYSTOLIC BLOOD PRESSURE: 166 MMHG | RESPIRATION RATE: 20 BRPM | BODY MASS INDEX: 22.58 KG/M2 | TEMPERATURE: 97.8 F

## 2019-04-15 DIAGNOSIS — R10.9 ABDOMINAL PAIN, UNSPECIFIED ABDOMINAL LOCATION: ICD-10-CM

## 2019-04-15 DIAGNOSIS — J01.90 ACUTE SINUSITIS, RECURRENCE NOT SPECIFIED, UNSPECIFIED LOCATION: ICD-10-CM

## 2019-04-15 DIAGNOSIS — H66.90 ACUTE OTITIS MEDIA, UNSPECIFIED OTITIS MEDIA TYPE: Primary | ICD-10-CM

## 2019-04-15 LAB
ALBUMIN SERPL BCP-MCNC: 4.4 G/DL (ref 3.5–5.7)
ALP SERPL-CCNC: 89 U/L (ref 40–150)
ALT SERPL W P-5'-P-CCNC: 35 U/L (ref 7–52)
AMPHETAMINES SERPL QL SCN: POSITIVE
ANION GAP SERPL CALCULATED.3IONS-SCNC: 8 MMOL/L (ref 4–13)
APTT PPP: 38 SECONDS (ref 26–38)
AST SERPL W P-5'-P-CCNC: 48 U/L (ref 13–39)
ATRIAL RATE: 69 BPM
BARBITURATES UR QL: NEGATIVE
BASOPHILS # BLD AUTO: 0 THOUSANDS/ΜL (ref 0–0.1)
BASOPHILS NFR BLD AUTO: 1 % (ref 0–2)
BENZODIAZ UR QL: NEGATIVE
BILIRUB SERPL-MCNC: 0.4 MG/DL (ref 0.2–1)
BILIRUB UR QL STRIP: NEGATIVE
BUN SERPL-MCNC: 8 MG/DL (ref 7–25)
CALCIUM SERPL-MCNC: 9.6 MG/DL (ref 8.6–10.5)
CHLORIDE SERPL-SCNC: 98 MMOL/L (ref 98–107)
CLARITY UR: CLEAR
CO2 SERPL-SCNC: 30 MMOL/L (ref 21–31)
COCAINE UR QL: NEGATIVE
COLOR UR: YELLOW
CREAT SERPL-MCNC: 0.64 MG/DL (ref 0.6–1.2)
EOSINOPHIL # BLD AUTO: 0.2 THOUSAND/ΜL (ref 0–0.61)
EOSINOPHIL NFR BLD AUTO: 2 % (ref 0–5)
ERYTHROCYTE [DISTWIDTH] IN BLOOD BY AUTOMATED COUNT: 14.5 % (ref 11.5–14.5)
ETHANOL SERPL-MCNC: <10 MG/DL
EXT PREG TEST URINE: NEGATIVE
GFR SERPL CREATININE-BSD FRML MDRD: 107 ML/MIN/1.73SQ M
GLUCOSE SERPL-MCNC: 100 MG/DL (ref 65–99)
GLUCOSE UR STRIP-MCNC: NEGATIVE MG/DL
HCT VFR BLD AUTO: 44.9 % (ref 42–47)
HGB BLD-MCNC: 15.4 G/DL (ref 12–16)
HGB UR QL STRIP.AUTO: NEGATIVE
INR PPP: 0.85 (ref 0.9–1.5)
KETONES UR STRIP-MCNC: NEGATIVE MG/DL
LEUKOCYTE ESTERASE UR QL STRIP: NEGATIVE
LYMPHOCYTES # BLD AUTO: 1.6 THOUSANDS/ΜL (ref 0.6–4.47)
LYMPHOCYTES NFR BLD AUTO: 23 % (ref 21–51)
MAGNESIUM SERPL-MCNC: 2.1 MG/DL (ref 1.9–2.7)
MCH RBC QN AUTO: 33.7 PG (ref 26–34)
MCHC RBC AUTO-ENTMCNC: 34.2 G/DL (ref 31–37)
MCV RBC AUTO: 99 FL (ref 81–99)
METHADONE UR QL: NEGATIVE
MONOCYTES # BLD AUTO: 0.6 THOUSAND/ΜL (ref 0.17–1.22)
MONOCYTES NFR BLD AUTO: 9 % (ref 2–12)
NEUTROPHILS # BLD AUTO: 4.7 THOUSANDS/ΜL (ref 1.4–6.5)
NEUTS SEG NFR BLD AUTO: 66 % (ref 42–75)
NITRITE UR QL STRIP: NEGATIVE
OPIATES UR QL SCN: NEGATIVE
P AXIS: 62 DEGREES
PCP UR QL: NEGATIVE
PH UR STRIP.AUTO: 6.5 [PH]
PLATELET # BLD AUTO: 155 THOUSANDS/UL (ref 149–390)
PMV BLD AUTO: 7.7 FL (ref 8.6–11.7)
POTASSIUM SERPL-SCNC: 3.8 MMOL/L (ref 3.5–5.5)
PR INTERVAL: 138 MS
PROT SERPL-MCNC: 7.1 G/DL (ref 6.4–8.9)
PROT UR STRIP-MCNC: NEGATIVE MG/DL
PROTHROMBIN TIME: 9.9 SECONDS (ref 10.2–13)
QRS AXIS: 35 DEGREES
QRSD INTERVAL: 76 MS
QT INTERVAL: 444 MS
QTC INTERVAL: 475 MS
RBC # BLD AUTO: 4.56 MILLION/UL (ref 3.9–5.2)
SODIUM SERPL-SCNC: 136 MMOL/L (ref 134–143)
SP GR UR STRIP.AUTO: <=1.005 (ref 1–1.03)
T WAVE AXIS: 57 DEGREES
THC UR QL: NEGATIVE
UROBILINOGEN UR QL STRIP.AUTO: 0.2 E.U./DL
VENTRICULAR RATE: 69 BPM
WBC # BLD AUTO: 7.2 THOUSAND/UL (ref 4.8–10.8)

## 2019-04-15 PROCEDURE — 85730 THROMBOPLASTIN TIME PARTIAL: CPT | Performed by: PHYSICIAN ASSISTANT

## 2019-04-15 PROCEDURE — 85610 PROTHROMBIN TIME: CPT | Performed by: PHYSICIAN ASSISTANT

## 2019-04-15 PROCEDURE — 71045 X-RAY EXAM CHEST 1 VIEW: CPT

## 2019-04-15 PROCEDURE — 96361 HYDRATE IV INFUSION ADD-ON: CPT

## 2019-04-15 PROCEDURE — 93010 ELECTROCARDIOGRAM REPORT: CPT | Performed by: INTERNAL MEDICINE

## 2019-04-15 PROCEDURE — 74177 CT ABD & PELVIS W/CONTRAST: CPT

## 2019-04-15 PROCEDURE — 96374 THER/PROPH/DIAG INJ IV PUSH: CPT

## 2019-04-15 PROCEDURE — 80320 DRUG SCREEN QUANTALCOHOLS: CPT | Performed by: PHYSICIAN ASSISTANT

## 2019-04-15 PROCEDURE — 80053 COMPREHEN METABOLIC PANEL: CPT | Performed by: PHYSICIAN ASSISTANT

## 2019-04-15 PROCEDURE — 81003 URINALYSIS AUTO W/O SCOPE: CPT | Performed by: PHYSICIAN ASSISTANT

## 2019-04-15 PROCEDURE — 99285 EMERGENCY DEPT VISIT HI MDM: CPT

## 2019-04-15 PROCEDURE — 85025 COMPLETE CBC W/AUTO DIFF WBC: CPT | Performed by: PHYSICIAN ASSISTANT

## 2019-04-15 PROCEDURE — 81025 URINE PREGNANCY TEST: CPT | Performed by: PHYSICIAN ASSISTANT

## 2019-04-15 PROCEDURE — 36415 COLL VENOUS BLD VENIPUNCTURE: CPT | Performed by: PHYSICIAN ASSISTANT

## 2019-04-15 PROCEDURE — 80307 DRUG TEST PRSMV CHEM ANLYZR: CPT | Performed by: PHYSICIAN ASSISTANT

## 2019-04-15 PROCEDURE — 83735 ASSAY OF MAGNESIUM: CPT | Performed by: PHYSICIAN ASSISTANT

## 2019-04-15 PROCEDURE — 93005 ELECTROCARDIOGRAM TRACING: CPT

## 2019-04-15 RX ORDER — AMOXICILLIN AND CLAVULANATE POTASSIUM 875; 125 MG/1; MG/1
1 TABLET, FILM COATED ORAL EVERY 12 HOURS SCHEDULED
Qty: 20 TABLET | Refills: 0 | Status: SHIPPED | OUTPATIENT
Start: 2019-04-15 | End: 2019-04-25

## 2019-04-15 RX ORDER — DOXYCYCLINE HYCLATE 100 MG/1
100 CAPSULE ORAL 2 TIMES DAILY
Qty: 20 CAPSULE | Refills: 0 | Status: SHIPPED | OUTPATIENT
Start: 2019-04-15 | End: 2019-04-25

## 2019-04-15 RX ORDER — ONDANSETRON 2 MG/ML
4 INJECTION INTRAMUSCULAR; INTRAVENOUS ONCE
Status: COMPLETED | OUTPATIENT
Start: 2019-04-15 | End: 2019-04-15

## 2019-04-15 RX ADMIN — ONDANSETRON 4 MG: 2 INJECTION INTRAMUSCULAR; INTRAVENOUS at 15:30

## 2019-04-15 RX ADMIN — IOHEXOL 100 ML: 350 INJECTION, SOLUTION INTRAVENOUS at 16:38

## 2019-04-15 RX ADMIN — SODIUM CHLORIDE 1000 ML: 0.9 INJECTION, SOLUTION INTRAVENOUS at 15:30

## 2019-05-14 DIAGNOSIS — Z12.31 ENCOUNTER FOR SCREENING MAMMOGRAM FOR MALIGNANT NEOPLASM OF BREAST: Primary | ICD-10-CM

## 2019-05-14 DIAGNOSIS — N64.4 BREAST PAIN IN FEMALE: ICD-10-CM

## 2019-05-31 ENCOUNTER — HOSPITAL ENCOUNTER (OUTPATIENT)
Dept: MAMMOGRAPHY | Facility: HOSPITAL | Age: 49
Discharge: HOME | End: 2019-05-31
Payer: COMMERCIAL

## 2019-05-31 VITALS — HEIGHT: 60 IN | BODY MASS INDEX: 21.99 KG/M2 | WEIGHT: 112 LBS

## 2019-05-31 DIAGNOSIS — N64.4 BREAST PAIN IN FEMALE: ICD-10-CM

## 2019-05-31 PROCEDURE — G0279 TOMOSYNTHESIS, MAMMO: HCPCS

## 2019-05-31 PROCEDURE — 77066 DX MAMMO INCL CAD BI: CPT

## 2019-06-14 ENCOUNTER — TELEPHONE (OUTPATIENT)
Dept: FAMILY MEDICINE CLINIC | Facility: CLINIC | Age: 49
End: 2019-06-14

## 2019-06-26 ENCOUNTER — TELEPHONE (OUTPATIENT)
Dept: FAMILY MEDICINE CLINIC | Facility: CLINIC | Age: 49
End: 2019-06-26

## 2019-07-26 ENCOUNTER — OFFICE VISIT (OUTPATIENT)
Dept: FAMILY MEDICINE CLINIC | Facility: HOME HEALTHCARE | Age: 49
End: 2019-07-26
Payer: COMMERCIAL

## 2019-07-26 VITALS
HEART RATE: 74 BPM | SYSTOLIC BLOOD PRESSURE: 136 MMHG | RESPIRATION RATE: 18 BRPM | WEIGHT: 107 LBS | DIASTOLIC BLOOD PRESSURE: 88 MMHG | OXYGEN SATURATION: 96 % | TEMPERATURE: 98.1 F | BODY MASS INDEX: 21.01 KG/M2 | HEIGHT: 60 IN

## 2019-07-26 DIAGNOSIS — E78.2 MIXED HYPERLIPIDEMIA: Primary | ICD-10-CM

## 2019-07-26 DIAGNOSIS — L03.811 CELLULITIS OF HEAD EXCEPT FACE: ICD-10-CM

## 2019-07-26 DIAGNOSIS — Z13.1 SCREENING FOR DIABETES MELLITUS: ICD-10-CM

## 2019-07-26 DIAGNOSIS — R53.83 FATIGUE, UNSPECIFIED TYPE: ICD-10-CM

## 2019-07-26 PROCEDURE — T1015 CLINIC SERVICE: HCPCS | Performed by: FAMILY MEDICINE

## 2019-07-26 PROCEDURE — 99213 OFFICE O/P EST LOW 20 MIN: CPT | Performed by: FAMILY MEDICINE

## 2019-07-26 RX ORDER — CEPHALEXIN 500 MG/1
500 CAPSULE ORAL EVERY 8 HOURS SCHEDULED
Qty: 21 CAPSULE | Refills: 0 | Status: SHIPPED | OUTPATIENT
Start: 2019-07-26 | End: 2019-08-02
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
## 2019-08-06 ENCOUNTER — OFFICE VISIT (OUTPATIENT)
Dept: FAMILY MEDICINE CLINIC | Facility: HOME HEALTHCARE | Age: 49
End: 2019-08-06
Payer: COMMERCIAL

## 2019-08-06 VITALS
HEIGHT: 60 IN | TEMPERATURE: 96.1 F | DIASTOLIC BLOOD PRESSURE: 70 MMHG | SYSTOLIC BLOOD PRESSURE: 112 MMHG | RESPIRATION RATE: 16 BRPM | HEART RATE: 100 BPM | WEIGHT: 106 LBS | OXYGEN SATURATION: 97 % | BODY MASS INDEX: 20.81 KG/M2

## 2019-08-06 DIAGNOSIS — L98.9 SKIN LESION: ICD-10-CM

## 2019-08-06 DIAGNOSIS — R53.83 FATIGUE, UNSPECIFIED TYPE: ICD-10-CM

## 2019-08-06 DIAGNOSIS — M79.7 FIBROMYALGIA: ICD-10-CM

## 2019-08-06 DIAGNOSIS — Z13.1 SCREENING FOR DIABETES MELLITUS: ICD-10-CM

## 2019-08-06 DIAGNOSIS — N92.6 MISSED PERIOD: Primary | ICD-10-CM

## 2019-08-06 DIAGNOSIS — E78.2 MIXED HYPERLIPIDEMIA: ICD-10-CM

## 2019-08-06 DIAGNOSIS — N92.6 MISSED PERIOD: ICD-10-CM

## 2019-08-06 LAB
ALBUMIN SERPL BCP-MCNC: 4.4 G/DL (ref 3.5–5)
ALP SERPL-CCNC: 127 U/L (ref 46–116)
ALT SERPL W P-5'-P-CCNC: 54 U/L (ref 12–78)
ANION GAP SERPL CALCULATED.3IONS-SCNC: 18 MMOL/L (ref 4–13)
AST SERPL W P-5'-P-CCNC: 136 U/L (ref 5–45)
BASOPHILS # BLD AUTO: 0.03 THOUSANDS/ΜL (ref 0–0.1)
BASOPHILS NFR BLD AUTO: 1 % (ref 0–1)
BILIRUB SERPL-MCNC: 0.55 MG/DL (ref 0.2–1)
BUN SERPL-MCNC: 7 MG/DL (ref 5–25)
CALCIUM SERPL-MCNC: 8.7 MG/DL (ref 8.3–10.1)
CHLORIDE SERPL-SCNC: 86 MMOL/L (ref 100–108)
CHOLEST SERPL-MCNC: 269 MG/DL (ref 50–200)
CO2 SERPL-SCNC: 24 MMOL/L (ref 21–32)
CREAT SERPL-MCNC: 0.53 MG/DL (ref 0.6–1.3)
EOSINOPHIL # BLD AUTO: 0.02 THOUSAND/ΜL (ref 0–0.61)
EOSINOPHIL NFR BLD AUTO: 0 % (ref 0–6)
ERYTHROCYTE [DISTWIDTH] IN BLOOD BY AUTOMATED COUNT: 12.2 % (ref 11.6–15.1)
EST. AVERAGE GLUCOSE BLD GHB EST-MCNC: 97 MG/DL
GFR SERPL CREATININE-BSD FRML MDRD: 113 ML/MIN/1.73SQ M
GLUCOSE P FAST SERPL-MCNC: 114 MG/DL (ref 65–99)
HBA1C MFR BLD: 5 % (ref 4.2–6.3)
HCG SERPL QL: NEGATIVE
HCT VFR BLD AUTO: 39.7 % (ref 34.8–46.1)
HDLC SERPL-MCNC: 131 MG/DL (ref 40–60)
HGB BLD-MCNC: 14.1 G/DL (ref 11.5–15.4)
IMM GRANULOCYTES # BLD AUTO: 0.02 THOUSAND/UL (ref 0–0.2)
IMM GRANULOCYTES NFR BLD AUTO: 0 % (ref 0–2)
LDLC SERPL CALC-MCNC: 80 MG/DL (ref 0–100)
LYMPHOCYTES # BLD AUTO: 0.99 THOUSANDS/ΜL (ref 0.6–4.47)
LYMPHOCYTES NFR BLD AUTO: 21 % (ref 14–44)
MCH RBC QN AUTO: 33.6 PG (ref 26.8–34.3)
MCHC RBC AUTO-ENTMCNC: 35.5 G/DL (ref 31.4–37.4)
MCV RBC AUTO: 95 FL (ref 82–98)
MONOCYTES # BLD AUTO: 0.48 THOUSAND/ΜL (ref 0.17–1.22)
MONOCYTES NFR BLD AUTO: 10 % (ref 4–12)
NEUTROPHILS # BLD AUTO: 3.18 THOUSANDS/ΜL (ref 1.85–7.62)
NEUTS SEG NFR BLD AUTO: 68 % (ref 43–75)
NONHDLC SERPL-MCNC: 138 MG/DL
NRBC BLD AUTO-RTO: 0 /100 WBCS
PLATELET # BLD AUTO: 98 THOUSANDS/UL (ref 149–390)
PMV BLD AUTO: 10.4 FL (ref 8.9–12.7)
POTASSIUM SERPL-SCNC: 3.3 MMOL/L (ref 3.5–5.3)
PROT SERPL-MCNC: 7.5 G/DL (ref 6.4–8.2)
RBC # BLD AUTO: 4.2 MILLION/UL (ref 3.81–5.12)
SODIUM SERPL-SCNC: 128 MMOL/L (ref 136–145)
TRIGL SERPL-MCNC: 292 MG/DL
TSH SERPL DL<=0.05 MIU/L-ACNC: 0.71 UIU/ML (ref 0.36–3.74)
WBC # BLD AUTO: 4.72 THOUSAND/UL (ref 4.31–10.16)

## 2019-08-06 PROCEDURE — T1015 CLINIC SERVICE: HCPCS | Performed by: FAMILY MEDICINE

## 2019-08-06 PROCEDURE — 80053 COMPREHEN METABOLIC PANEL: CPT | Performed by: NURSE PRACTITIONER

## 2019-08-06 PROCEDURE — 99213 OFFICE O/P EST LOW 20 MIN: CPT | Performed by: FAMILY MEDICINE

## 2019-08-06 PROCEDURE — 83036 HEMOGLOBIN GLYCOSYLATED A1C: CPT | Performed by: NURSE PRACTITIONER

## 2019-08-06 PROCEDURE — 80061 LIPID PANEL: CPT | Performed by: NURSE PRACTITIONER

## 2019-08-06 PROCEDURE — 85025 COMPLETE CBC W/AUTO DIFF WBC: CPT | Performed by: NURSE PRACTITIONER

## 2019-08-06 PROCEDURE — 84443 ASSAY THYROID STIM HORMONE: CPT | Performed by: NURSE PRACTITIONER

## 2019-08-06 PROCEDURE — 84703 CHORIONIC GONADOTROPIN ASSAY: CPT | Performed by: NURSE PRACTITIONER

## 2019-08-06 RX ORDER — DULOXETIN HYDROCHLORIDE 20 MG/1
20 CAPSULE, DELAYED RELEASE ORAL DAILY
Qty: 30 CAPSULE | Refills: 0 | Status: SHIPPED | OUTPATIENT
Start: 2019-08-06 | End: 2019-09-03 | Stop reason: SDUPTHER
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
## 2019-08-19 ENCOUNTER — OFFICE VISIT (OUTPATIENT)
Dept: FAMILY MEDICINE CLINIC | Facility: HOME HEALTHCARE | Age: 49
End: 2019-08-19
Payer: COMMERCIAL

## 2019-08-19 VITALS
HEART RATE: 91 BPM | OXYGEN SATURATION: 97 % | RESPIRATION RATE: 19 BRPM | BODY MASS INDEX: 20.38 KG/M2 | DIASTOLIC BLOOD PRESSURE: 90 MMHG | TEMPERATURE: 98 F | SYSTOLIC BLOOD PRESSURE: 140 MMHG | HEIGHT: 60 IN | WEIGHT: 103.8 LBS

## 2019-08-19 DIAGNOSIS — R11.10 NON-INTRACTABLE VOMITING, PRESENCE OF NAUSEA NOT SPECIFIED, UNSPECIFIED VOMITING TYPE: Primary | ICD-10-CM

## 2019-08-19 DIAGNOSIS — R10.84 GENERALIZED ABDOMINAL PAIN: ICD-10-CM

## 2019-08-19 DIAGNOSIS — E78.5 HYPERLIPIDEMIA, UNSPECIFIED HYPERLIPIDEMIA TYPE: ICD-10-CM

## 2019-08-19 PROBLEM — J06.9 URI (UPPER RESPIRATORY INFECTION): Status: RESOLVED | Noted: 2018-10-26 | Resolved: 2019-08-19

## 2019-08-19 PROCEDURE — 99213 OFFICE O/P EST LOW 20 MIN: CPT | Performed by: FAMILY MEDICINE

## 2019-08-19 PROCEDURE — T1015 CLINIC SERVICE: HCPCS | Performed by: FAMILY MEDICINE

## 2019-08-19 RX ORDER — ONDANSETRON 4 MG/1
4 TABLET, FILM COATED ORAL EVERY 8 HOURS PRN
Qty: 20 TABLET | Refills: 0 | Status: ON HOLD | OUTPATIENT
Start: 2019-08-19 | End: 2019-10-22

## 2019-08-19 RX ORDER — SIMVASTATIN 20 MG
20 TABLET ORAL
Qty: 30 TABLET | Refills: 2 | Status: SHIPPED | OUTPATIENT
Start: 2019-08-19 | End: 2021-07-29 | Stop reason: HOSPADM

## 2019-08-20 ENCOUNTER — APPOINTMENT (EMERGENCY)
Dept: CT IMAGING | Facility: HOSPITAL | Age: 49
End: 2019-08-20
Payer: COMMERCIAL

## 2019-08-20 ENCOUNTER — HOSPITAL ENCOUNTER (EMERGENCY)
Facility: HOSPITAL | Age: 49
Discharge: HOME | End: 2019-08-20
Attending: EMERGENCY MEDICINE | Admitting: EMERGENCY MEDICINE
Payer: COMMERCIAL

## 2019-08-20 ENCOUNTER — APPOINTMENT (EMERGENCY)
Dept: ULTRASOUND IMAGING | Facility: HOSPITAL | Age: 49
End: 2019-08-20
Payer: COMMERCIAL

## 2019-08-20 VITALS
TEMPERATURE: 98.1 F | WEIGHT: 103.84 LBS | OXYGEN SATURATION: 93 % | DIASTOLIC BLOOD PRESSURE: 89 MMHG | HEART RATE: 65 BPM | SYSTOLIC BLOOD PRESSURE: 153 MMHG | HEIGHT: 60 IN | RESPIRATION RATE: 16 BRPM | BODY MASS INDEX: 20.39 KG/M2

## 2019-08-20 DIAGNOSIS — R11.10 VOMITING: Primary | ICD-10-CM

## 2019-08-20 DIAGNOSIS — R74.8 ELEVATED LIVER ENZYMES: ICD-10-CM

## 2019-08-20 LAB
ALBUMIN SERPL BCP-MCNC: 3.6 G/DL (ref 3.5–5)
ALP SERPL-CCNC: 146 U/L (ref 46–116)
ALT SERPL W P-5'-P-CCNC: 92 U/L (ref 12–78)
AMPHETAMINES SERPL QL SCN: NEGATIVE
ANION GAP SERPL CALCULATED.3IONS-SCNC: 8 MMOL/L (ref 4–13)
APTT PPP: 27 SECONDS (ref 23–37)
AST SERPL W P-5'-P-CCNC: 295 U/L (ref 5–45)
BARBITURATES UR QL: NEGATIVE
BASOPHILS # BLD AUTO: 0.03 THOUSANDS/ΜL (ref 0–0.1)
BASOPHILS NFR BLD AUTO: 0 % (ref 0–1)
BENZODIAZ UR QL: NEGATIVE
BILIRUB SERPL-MCNC: 0.6 MG/DL (ref 0.2–1)
BILIRUB UR QL STRIP: NEGATIVE
BUN SERPL-MCNC: 3 MG/DL (ref 5–25)
CALCIUM SERPL-MCNC: 8.8 MG/DL (ref 8.3–10.1)
CHLORIDE SERPL-SCNC: 96 MMOL/L (ref 100–108)
CLARITY UR: CLEAR
CO2 SERPL-SCNC: 30 MMOL/L (ref 21–32)
COCAINE UR QL: NEGATIVE
COLOR UR: NORMAL
CREAT SERPL-MCNC: 0.69 MG/DL (ref 0.6–1.3)
EOSINOPHIL # BLD AUTO: 0.02 THOUSAND/ΜL (ref 0–0.61)
EOSINOPHIL NFR BLD AUTO: 0 % (ref 0–6)
ERYTHROCYTE [DISTWIDTH] IN BLOOD BY AUTOMATED COUNT: 13.5 % (ref 11.6–15.1)
EXT PREG TEST URINE: NEGATIVE
EXT. CONTROL ED NAV: NORMAL
GFR SERPL CREATININE-BSD FRML MDRD: 103 ML/MIN/1.73SQ M
GLUCOSE SERPL-MCNC: 107 MG/DL (ref 65–140)
GLUCOSE UR STRIP-MCNC: NEGATIVE MG/DL
HCT VFR BLD AUTO: 39.6 % (ref 34.8–46.1)
HGB BLD-MCNC: 13.4 G/DL (ref 11.5–15.4)
HGB UR QL STRIP.AUTO: NEGATIVE
IMM GRANULOCYTES # BLD AUTO: 0.03 THOUSAND/UL (ref 0–0.2)
IMM GRANULOCYTES NFR BLD AUTO: 0 % (ref 0–2)
INR PPP: 0.86 (ref 0.84–1.19)
KETONES UR STRIP-MCNC: NEGATIVE MG/DL
LACTATE SERPL-SCNC: 1.4 MMOL/L (ref 0.5–2)
LEUKOCYTE ESTERASE UR QL STRIP: NEGATIVE
LIPASE SERPL-CCNC: 148 U/L (ref 73–393)
LYMPHOCYTES # BLD AUTO: 1.07 THOUSANDS/ΜL (ref 0.6–4.47)
LYMPHOCYTES NFR BLD AUTO: 15 % (ref 14–44)
MAGNESIUM SERPL-MCNC: 1.7 MG/DL (ref 1.6–2.6)
MCH RBC QN AUTO: 33.5 PG (ref 26.8–34.3)
MCHC RBC AUTO-ENTMCNC: 33.8 G/DL (ref 31.4–37.4)
MCV RBC AUTO: 99 FL (ref 82–98)
METHADONE UR QL: NEGATIVE
MONOCYTES # BLD AUTO: 0.47 THOUSAND/ΜL (ref 0.17–1.22)
MONOCYTES NFR BLD AUTO: 6 % (ref 4–12)
NEUTROPHILS # BLD AUTO: 5.77 THOUSANDS/ΜL (ref 1.85–7.62)
NEUTS SEG NFR BLD AUTO: 79 % (ref 43–75)
NITRITE UR QL STRIP: NEGATIVE
NRBC BLD AUTO-RTO: 0 /100 WBCS
OPIATES UR QL SCN: NEGATIVE
PCP UR QL: NEGATIVE
PH UR STRIP.AUTO: 5 [PH]
PLATELET # BLD AUTO: 143 THOUSANDS/UL (ref 149–390)
PMV BLD AUTO: 8.7 FL (ref 8.9–12.7)
POTASSIUM SERPL-SCNC: 3.3 MMOL/L (ref 3.5–5.3)
PROT SERPL-MCNC: 7.1 G/DL (ref 6.4–8.2)
PROT UR STRIP-MCNC: NEGATIVE MG/DL
PROTHROMBIN TIME: 11.7 SECONDS (ref 11.6–14.5)
RBC # BLD AUTO: 4 MILLION/UL (ref 3.81–5.12)
SODIUM SERPL-SCNC: 134 MMOL/L (ref 136–145)
SP GR UR STRIP.AUTO: <=1.005 (ref 1–1.03)
THC UR QL: NEGATIVE
TROPONIN I SERPL-MCNC: <0.02 NG/ML
TSH SERPL DL<=0.05 MIU/L-ACNC: 0.82 UIU/ML (ref 0.36–3.74)
UROBILINOGEN UR QL STRIP.AUTO: 0.2 E.U./DL
WBC # BLD AUTO: 7.39 THOUSAND/UL (ref 4.31–10.16)

## 2019-08-20 PROCEDURE — 84443 ASSAY THYROID STIM HORMONE: CPT | Performed by: PHYSICIAN ASSISTANT

## 2019-08-20 PROCEDURE — 80053 COMPREHEN METABOLIC PANEL: CPT | Performed by: PHYSICIAN ASSISTANT

## 2019-08-20 PROCEDURE — 85730 THROMBOPLASTIN TIME PARTIAL: CPT | Performed by: PHYSICIAN ASSISTANT

## 2019-08-20 PROCEDURE — 70450 CT HEAD/BRAIN W/O DYE: CPT

## 2019-08-20 PROCEDURE — 93005 ELECTROCARDIOGRAM TRACING: CPT

## 2019-08-20 PROCEDURE — 81003 URINALYSIS AUTO W/O SCOPE: CPT | Performed by: PHYSICIAN ASSISTANT

## 2019-08-20 PROCEDURE — 85610 PROTHROMBIN TIME: CPT | Performed by: PHYSICIAN ASSISTANT

## 2019-08-20 PROCEDURE — 83605 ASSAY OF LACTIC ACID: CPT | Performed by: PHYSICIAN ASSISTANT

## 2019-08-20 PROCEDURE — 87040 BLOOD CULTURE FOR BACTERIA: CPT | Performed by: PHYSICIAN ASSISTANT

## 2019-08-20 PROCEDURE — 74177 CT ABD & PELVIS W/CONTRAST: CPT

## 2019-08-20 PROCEDURE — 96365 THER/PROPH/DIAG IV INF INIT: CPT

## 2019-08-20 PROCEDURE — 36415 COLL VENOUS BLD VENIPUNCTURE: CPT | Performed by: PHYSICIAN ASSISTANT

## 2019-08-20 PROCEDURE — 96361 HYDRATE IV INFUSION ADD-ON: CPT

## 2019-08-20 PROCEDURE — 81025 URINE PREGNANCY TEST: CPT | Performed by: PHYSICIAN ASSISTANT

## 2019-08-20 PROCEDURE — 96375 TX/PRO/DX INJ NEW DRUG ADDON: CPT

## 2019-08-20 PROCEDURE — 85025 COMPLETE CBC W/AUTO DIFF WBC: CPT | Performed by: PHYSICIAN ASSISTANT

## 2019-08-20 PROCEDURE — 84484 ASSAY OF TROPONIN QUANT: CPT | Performed by: PHYSICIAN ASSISTANT

## 2019-08-20 PROCEDURE — 80307 DRUG TEST PRSMV CHEM ANLYZR: CPT | Performed by: PHYSICIAN ASSISTANT

## 2019-08-20 PROCEDURE — 76705 ECHO EXAM OF ABDOMEN: CPT

## 2019-08-20 PROCEDURE — 83735 ASSAY OF MAGNESIUM: CPT | Performed by: PHYSICIAN ASSISTANT

## 2019-08-20 PROCEDURE — 99284 EMERGENCY DEPT VISIT MOD MDM: CPT | Performed by: PHYSICIAN ASSISTANT

## 2019-08-20 PROCEDURE — 83690 ASSAY OF LIPASE: CPT | Performed by: PHYSICIAN ASSISTANT

## 2019-08-20 PROCEDURE — 99285 EMERGENCY DEPT VISIT HI MDM: CPT

## 2019-08-20 RX ORDER — ONDANSETRON 2 MG/ML
4 INJECTION INTRAMUSCULAR; INTRAVENOUS ONCE
Status: COMPLETED | OUTPATIENT
Start: 2019-08-20 | End: 2019-08-20

## 2019-08-20 RX ORDER — ONDANSETRON 4 MG/1
4 TABLET, ORALLY DISINTEGRATING ORAL EVERY 6 HOURS PRN
Qty: 20 TABLET | Refills: 0 | Status: SHIPPED | OUTPATIENT
Start: 2019-08-20 | End: 2019-09-03 | Stop reason: SDUPTHER

## 2019-08-20 RX ADMIN — SODIUM CHLORIDE 1000 ML: 0.9 INJECTION, SOLUTION INTRAVENOUS at 11:41

## 2019-08-20 RX ADMIN — IOHEXOL 100 ML: 350 INJECTION, SOLUTION INTRAVENOUS at 13:02

## 2019-08-20 RX ADMIN — THIAMINE HYDROCHLORIDE 100 MG: 100 INJECTION, SOLUTION INTRAMUSCULAR; INTRAVENOUS at 12:43

## 2019-08-20 RX ADMIN — ONDANSETRON 4 MG: 2 INJECTION INTRAMUSCULAR; INTRAVENOUS at 11:42

## 2019-08-20 NOTE — CASE MANAGEMENT
I self referred the patient to discuss resources that might be available to her  She stated she would like meals on wheels  I did put a call in to meals on wheels on the patients behalf  She denied needing anything else at this time  The patient lives alone in an apartment  There is a couple of stairs into the apartment  She has no medical equipment  She has no other resources coming into the home at this time  She does take care of her own ADL's but states sometimes she gets dizzy  She does not drive, she walks to her appointments and pharmacy  She uses [facility] in [facility] and has no trouble getting or paying for her medicine  She has [employer] for insurance  She stated she did call her PCP prior to coming to the ED today
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
## 2019-08-21 LAB
ATRIAL RATE: 71 BPM
P AXIS: 50 DEGREES
PR INTERVAL: 138 MS
QRS AXIS: 47 DEGREES
QRSD INTERVAL: 74 MS
QT INTERVAL: 440 MS
QTC INTERVAL: 478 MS
T WAVE AXIS: 71 DEGREES
VENTRICULAR RATE: 71 BPM

## 2019-08-21 PROCEDURE — 93010 ELECTROCARDIOGRAM REPORT: CPT | Performed by: INTERNAL MEDICINE

## 2019-08-25 LAB
BACTERIA BLD CULT: NORMAL
BACTERIA BLD CULT: NORMAL

## 2019-08-27 ENCOUNTER — TELEPHONE (OUTPATIENT)
Dept: GASTROENTEROLOGY | Facility: CLINIC | Age: 49
End: 2019-08-27

## 2019-08-27 NOTE — TELEPHONE ENCOUNTER
New pt needs an appt for vomiting/nausea  She sometimes has black stools + vomits blood  Pt being referred by Dr Devi [facility]    [phone]

## 2019-08-28 NOTE — TELEPHONE ENCOUNTER
Called patient  She said she was walking in the rain at the time of phone call  She will contact our office back

## 2019-09-03 DIAGNOSIS — R11.10 VOMITING: ICD-10-CM

## 2019-09-03 DIAGNOSIS — R74.8 ELEVATED LIVER ENZYMES: ICD-10-CM

## 2019-09-03 DIAGNOSIS — M79.7 FIBROMYALGIA: ICD-10-CM

## 2019-09-04 RX ORDER — DULOXETIN HYDROCHLORIDE 20 MG/1
20 CAPSULE, DELAYED RELEASE ORAL DAILY
Qty: 30 CAPSULE | Refills: 3 | Status: ON HOLD | OUTPATIENT
Start: 2019-09-04 | End: 2019-10-22

## 2019-09-04 RX ORDER — ONDANSETRON 4 MG/1
4 TABLET, ORALLY DISINTEGRATING ORAL EVERY 6 HOURS PRN
Qty: 20 TABLET | Refills: 0 | Status: ON HOLD | OUTPATIENT
Start: 2019-09-04 | End: 2019-10-22

## 2019-09-11 ENCOUNTER — DOCUMENTATION (OUTPATIENT)
Dept: FAMILY MEDICINE CLINIC | Facility: HOME HEALTHCARE | Age: 49
End: 2019-09-11

## 2019-09-11 NOTE — PROGRESS NOTES
TRIED CALLING PT TO RS APPT THAT WAS SET FOR TODAY WITH JARED Louise PROVIDER CALLED IN HAVING A FAMILY EMERGENCY

## 2019-10-17 ENCOUNTER — HOSPITAL ENCOUNTER (EMERGENCY)
Facility: HOSPITAL | Age: 49
Discharge: HOME | End: 2019-10-18
Attending: EMERGENCY MEDICINE | Admitting: EMERGENCY MEDICINE
Payer: COMMERCIAL

## 2019-10-17 VITALS
BODY MASS INDEX: 21.6 KG/M2 | RESPIRATION RATE: 20 BRPM | WEIGHT: 110 LBS | HEIGHT: 60 IN | TEMPERATURE: 97.6 F | HEART RATE: 90 BPM | DIASTOLIC BLOOD PRESSURE: 71 MMHG | SYSTOLIC BLOOD PRESSURE: 108 MMHG | OXYGEN SATURATION: 98 %

## 2019-10-17 DIAGNOSIS — F19.929 INTOXICATION BY DRUG (HCC): Primary | ICD-10-CM

## 2019-10-17 LAB — ETHANOL EXG-MCNC: 0.15 MG/DL

## 2019-10-17 PROCEDURE — 99284 EMERGENCY DEPT VISIT MOD MDM: CPT

## 2019-10-17 PROCEDURE — 82075 ASSAY OF BREATH ETHANOL: CPT | Performed by: EMERGENCY MEDICINE

## 2019-10-17 RX ORDER — LORAZEPAM 1 MG/1
2 TABLET ORAL ONCE
Status: COMPLETED | OUTPATIENT
Start: 2019-10-17 | End: 2019-10-17

## 2019-10-17 RX ADMIN — LORAZEPAM 2 MG: 1 TABLET ORAL at 21:54

## 2019-10-18 LAB
AMPHETAMINES SERPL QL SCN: POSITIVE
BARBITURATES UR QL: NEGATIVE
BENZODIAZ UR QL: NEGATIVE
COCAINE UR QL: NEGATIVE
ETHANOL EXG-MCNC: 0.07 MG/DL
METHADONE UR QL: NEGATIVE
OPIATES UR QL SCN: NEGATIVE
PCP UR QL: NEGATIVE
THC UR QL: NEGATIVE

## 2019-10-18 PROCEDURE — 82075 ASSAY OF BREATH ETHANOL: CPT | Performed by: EMERGENCY MEDICINE

## 2019-10-18 PROCEDURE — 80307 DRUG TEST PRSMV CHEM ANLYZR: CPT | Performed by: EMERGENCY MEDICINE

## 2019-10-18 NOTE — ED NOTES
Wong Bhagat from ACT team called to say she is enroute and will be picking up the patient to transport her to [facility] in roughly 10 minutes  ED RN notified  [facility] staff Derick Westbrook) informed of ETA of 8/8:30 (by the time they get her medications and clothing from her home and get to [facility])
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
## 2019-10-18 NOTE — ED NOTES
Per Charlie Shetty at [facility], they will accept the patient  [facility] to provide transportation  Change of shift is pending, so on-, Francheska Cunha, will coordinate with oncoming staff and call with a projected time  The patient will need her medications  Francheska Cunha is aware and assured that the nurse will have them  Per Charlie Shetty, they will contact [address] [place] [address] and there is no need for Crisis to contact the insurance company at this time

## 2019-10-18 NOTE — ED NOTES
Telephone contact was made with "Pushpa John," on-call for [facility]  Reviewed events leading up to admission and patient's presentation since that time, and Pushpa John acknowledged that patient is psychotic at baseline with intermittent drug and alcohol use which seems to have an effect on her thinking and mood, but not consistently in the same way  She acknowledges that there is no lethality and that her psychosis is not inconsistent with baseline  The patient is not agreeable to rehab and is not meeting criteria for inpatient mental health, and Pushpa John supports this, as well as a referral to [facility] at [facility]  She assured that the ACT team would assist with transport if accepted  She is hopeful that from there, they may be able to convince her to consider rehab

## 2019-10-18 NOTE — ED NOTES
Spoke with Osmany Marshall at [facility], who had questions and requested to speak with patient  She did so, then requested to speak with ACT team member  Ana Mayfield from [place] was paged ([phone]) and updated; agreed to call [facility] at [phone]  Patient has been sleeping, except when awoken by staff  She remains calm and cooperative with no evidence of lethality or psychosis during wakeful periods
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
## 2019-10-18 NOTE — ED NOTES
Pt has hx of alcoholism  Pt admits to drinking 1/2 bottle of vodka as well several beers earlier today   Pt slurring words and smells of ETOH     Jackson Benavides RN  10/17/19 2105

## 2019-10-18 NOTE — ED NOTES
Pt woke suddenly and began yelling obscenities at no one  Dr Jarad Calzada aware       Suzanne Favre, RN  10/17/19 7646

## 2019-10-18 NOTE — ED NOTES
Addendum received  Medical staff to complete  Once all forms are complete, will fax to [facility] for consideration  Did review plan with Dr Mayela Garcia, who voiced support

## 2019-10-18 NOTE — ED NOTES
Referral for [facility] completed and faxed along with medical addendum  Maximo Torres notified and will call with questions, concerns, or a determination after review

## 2019-10-18 NOTE — ED NOTES
Telephone contact was made with Clinton Weinstein at [facility], who confirms that they have a bed available  He will fax medical clearance addendums for completion  Referral form initiated

## 2019-10-21 ENCOUNTER — HOSPITAL ENCOUNTER (EMERGENCY)
Facility: HOSPITAL | Age: 49
End: 2019-10-22
Attending: EMERGENCY MEDICINE | Admitting: EMERGENCY MEDICINE
Payer: COMMERCIAL

## 2019-10-21 DIAGNOSIS — F29 PSYCHOSES (HCC): Primary | ICD-10-CM

## 2019-10-21 DIAGNOSIS — Z00.8 MEDICAL CLEARANCE FOR PSYCHIATRIC ADMISSION: Primary | ICD-10-CM

## 2019-10-21 LAB
ALBUMIN SERPL BCP-MCNC: 3.3 G/DL (ref 3.5–5)
ALP SERPL-CCNC: 79 U/L (ref 46–116)
ALT SERPL W P-5'-P-CCNC: 18 U/L (ref 12–78)
AMPHETAMINES SERPL QL SCN: POSITIVE
ANION GAP SERPL CALCULATED.3IONS-SCNC: 10 MMOL/L (ref 4–13)
AST SERPL W P-5'-P-CCNC: 18 U/L (ref 5–45)
BARBITURATES UR QL: NEGATIVE
BASOPHILS # BLD AUTO: 0.04 THOUSANDS/ΜL (ref 0–0.1)
BASOPHILS NFR BLD AUTO: 0 % (ref 0–1)
BENZODIAZ UR QL: NEGATIVE
BILIRUB SERPL-MCNC: 0.1 MG/DL (ref 0.2–1)
BILIRUB UR QL STRIP: NEGATIVE
BUN SERPL-MCNC: 5 MG/DL (ref 5–25)
CALCIUM SERPL-MCNC: 9.4 MG/DL (ref 8.3–10.1)
CHLORIDE SERPL-SCNC: 102 MMOL/L (ref 100–108)
CLARITY UR: CLEAR
CO2 SERPL-SCNC: 30 MMOL/L (ref 21–32)
COCAINE UR QL: NEGATIVE
COLOR UR: YELLOW
CREAT SERPL-MCNC: 0.62 MG/DL (ref 0.6–1.3)
EOSINOPHIL # BLD AUTO: 0.23 THOUSAND/ΜL (ref 0–0.61)
EOSINOPHIL NFR BLD AUTO: 2 % (ref 0–6)
ERYTHROCYTE [DISTWIDTH] IN BLOOD BY AUTOMATED COUNT: 12.9 % (ref 11.6–15.1)
ETHANOL EXG-MCNC: 0 MG/DL
GFR SERPL CREATININE-BSD FRML MDRD: 107 ML/MIN/1.73SQ M
GLUCOSE SERPL-MCNC: 103 MG/DL (ref 65–140)
GLUCOSE UR STRIP-MCNC: NEGATIVE MG/DL
HCG SERPL QL: NEGATIVE
HCT VFR BLD AUTO: 42.6 % (ref 34.8–46.1)
HGB BLD-MCNC: 14.2 G/DL (ref 11.5–15.4)
HGB UR QL STRIP.AUTO: NEGATIVE
IMM GRANULOCYTES # BLD AUTO: 0.03 THOUSAND/UL (ref 0–0.2)
IMM GRANULOCYTES NFR BLD AUTO: 0 % (ref 0–2)
KETONES UR STRIP-MCNC: NEGATIVE MG/DL
LEUKOCYTE ESTERASE UR QL STRIP: NEGATIVE
LYMPHOCYTES # BLD AUTO: 2.35 THOUSANDS/ΜL (ref 0.6–4.47)
LYMPHOCYTES NFR BLD AUTO: 24 % (ref 14–44)
MCH RBC QN AUTO: 33.4 PG (ref 26.8–34.3)
MCHC RBC AUTO-ENTMCNC: 33.3 G/DL (ref 31.4–37.4)
MCV RBC AUTO: 100 FL (ref 82–98)
METHADONE UR QL: NEGATIVE
MONOCYTES # BLD AUTO: 0.71 THOUSAND/ΜL (ref 0.17–1.22)
MONOCYTES NFR BLD AUTO: 7 % (ref 4–12)
NEUTROPHILS # BLD AUTO: 6.35 THOUSANDS/ΜL (ref 1.85–7.62)
NEUTS SEG NFR BLD AUTO: 67 % (ref 43–75)
NITRITE UR QL STRIP: NEGATIVE
NRBC BLD AUTO-RTO: 0 /100 WBCS
OPIATES UR QL SCN: NEGATIVE
PCP UR QL: NEGATIVE
PH UR STRIP.AUTO: 5 [PH]
PLATELET # BLD AUTO: 353 THOUSANDS/UL (ref 149–390)
PMV BLD AUTO: 9 FL (ref 8.9–12.7)
POTASSIUM SERPL-SCNC: 4.1 MMOL/L (ref 3.5–5.3)
PROT SERPL-MCNC: 7.1 G/DL (ref 6.4–8.2)
PROT UR STRIP-MCNC: NEGATIVE MG/DL
RBC # BLD AUTO: 4.25 MILLION/UL (ref 3.81–5.12)
SODIUM SERPL-SCNC: 142 MMOL/L (ref 136–145)
SP GR UR STRIP.AUTO: 1.01 (ref 1–1.03)
THC UR QL: NEGATIVE
TSH SERPL DL<=0.05 MIU/L-ACNC: 2.41 UIU/ML (ref 0.36–3.74)
UROBILINOGEN UR QL STRIP.AUTO: 0.2 E.U./DL
WBC # BLD AUTO: 9.71 THOUSAND/UL (ref 4.31–10.16)

## 2019-10-21 PROCEDURE — 82075 ASSAY OF BREATH ETHANOL: CPT | Performed by: EMERGENCY MEDICINE

## 2019-10-21 PROCEDURE — 99285 EMERGENCY DEPT VISIT HI MDM: CPT | Performed by: EMERGENCY MEDICINE

## 2019-10-21 PROCEDURE — 85025 COMPLETE CBC W/AUTO DIFF WBC: CPT | Performed by: EMERGENCY MEDICINE

## 2019-10-21 PROCEDURE — 84443 ASSAY THYROID STIM HORMONE: CPT | Performed by: EMERGENCY MEDICINE

## 2019-10-21 PROCEDURE — NC001 PR NO CHARGE: Performed by: EMERGENCY MEDICINE

## 2019-10-21 PROCEDURE — 93005 ELECTROCARDIOGRAM TRACING: CPT

## 2019-10-21 PROCEDURE — 99285 EMERGENCY DEPT VISIT HI MDM: CPT

## 2019-10-21 PROCEDURE — 84703 CHORIONIC GONADOTROPIN ASSAY: CPT | Performed by: EMERGENCY MEDICINE

## 2019-10-21 PROCEDURE — 80053 COMPREHEN METABOLIC PANEL: CPT | Performed by: EMERGENCY MEDICINE

## 2019-10-21 PROCEDURE — 81003 URINALYSIS AUTO W/O SCOPE: CPT | Performed by: EMERGENCY MEDICINE

## 2019-10-21 PROCEDURE — 36415 COLL VENOUS BLD VENIPUNCTURE: CPT | Performed by: EMERGENCY MEDICINE

## 2019-10-21 PROCEDURE — 80307 DRUG TEST PRSMV CHEM ANLYZR: CPT | Performed by: EMERGENCY MEDICINE

## 2019-10-21 RX ORDER — GABAPENTIN 300 MG/1
600 CAPSULE ORAL 3 TIMES DAILY
Status: DISCONTINUED | OUTPATIENT
Start: 2019-10-21 | End: 2019-10-22 | Stop reason: HOSPADM

## 2019-10-21 RX ORDER — TRAZODONE HYDROCHLORIDE 50 MG/1
50 TABLET ORAL
Status: CANCELLED | OUTPATIENT
Start: 2019-10-21

## 2019-10-21 RX ORDER — HALOPERIDOL 5 MG
5 TABLET ORAL EVERY 6 HOURS PRN
Status: CANCELLED | OUTPATIENT
Start: 2019-10-21

## 2019-10-21 RX ORDER — LORAZEPAM 0.5 MG/1
0.5 TABLET ORAL ONCE
Status: COMPLETED | OUTPATIENT
Start: 2019-10-21 | End: 2019-10-21

## 2019-10-21 RX ORDER — HALOPERIDOL 5 MG/ML
5 INJECTION INTRAMUSCULAR EVERY 6 HOURS PRN
Status: CANCELLED | OUTPATIENT
Start: 2019-10-21

## 2019-10-21 RX ORDER — BENZTROPINE MESYLATE 1 MG/1
1 TABLET ORAL EVERY 6 HOURS PRN
Status: CANCELLED | OUTPATIENT
Start: 2019-10-21

## 2019-10-21 RX ORDER — RISPERIDONE 1 MG/1
1 TABLET, FILM COATED ORAL 2 TIMES DAILY
Status: DISCONTINUED | OUTPATIENT
Start: 2019-10-21 | End: 2019-10-22 | Stop reason: HOSPADM

## 2019-10-21 RX ORDER — OLANZAPINE 10 MG/1
10 INJECTION, POWDER, LYOPHILIZED, FOR SOLUTION INTRAMUSCULAR ONCE
Status: DISCONTINUED | OUTPATIENT
Start: 2019-10-21 | End: 2019-10-21

## 2019-10-21 RX ORDER — ACETAMINOPHEN 325 MG/1
650 TABLET ORAL EVERY 6 HOURS PRN
Status: CANCELLED | OUTPATIENT
Start: 2019-10-21

## 2019-10-21 RX ORDER — HYDROXYZINE HYDROCHLORIDE 25 MG/1
25 TABLET, FILM COATED ORAL EVERY 6 HOURS PRN
Status: CANCELLED | OUTPATIENT
Start: 2019-10-21

## 2019-10-21 RX ORDER — BENZTROPINE MESYLATE 1 MG/ML
1 INJECTION INTRAMUSCULAR; INTRAVENOUS EVERY 6 HOURS PRN
Status: CANCELLED | OUTPATIENT
Start: 2019-10-21

## 2019-10-21 RX ORDER — MAGNESIUM HYDROXIDE/ALUMINUM HYDROXICE/SIMETHICONE 120; 1200; 1200 MG/30ML; MG/30ML; MG/30ML
30 SUSPENSION ORAL EVERY 4 HOURS PRN
Status: CANCELLED | OUTPATIENT
Start: 2019-10-21

## 2019-10-21 RX ORDER — RISPERIDONE 1 MG/1
1 TABLET, ORALLY DISINTEGRATING ORAL EVERY 6 HOURS PRN
Status: CANCELLED | OUTPATIENT
Start: 2019-10-21

## 2019-10-21 RX ADMIN — LORAZEPAM 0.5 MG: 0.5 TABLET ORAL at 17:34

## 2019-10-21 RX ADMIN — GABAPENTIN 600 MG: 300 CAPSULE ORAL at 18:47

## 2019-10-21 RX ADMIN — RISPERIDONE 1 MG: 1 TABLET ORAL at 17:34

## 2019-10-21 NOTE — ED NOTES
CW placed call to intake, spoke w/Xander  CW inquired on bed availability and shared pt is requesting placement at [facility], if possible       Clinicals sent    TDS, CW

## 2019-10-21 NOTE — ED NOTES
Pt presents as a self-referral from [address] [place] [address]  Pt reports she was staying at NP[facility] for the past 4 days but does not feel like she is getting any better  Pt reports an increase in voices, "sometimes commanding her to kill herself and other times not"  Pt adds, "I just need to be stable, I feel like I'm going crazy and my meds are not working  I cannot sleep, I cannot stay asleep, I don't like to be alone, I feel more afraid"  Pt maintains good eye contact w/this writer and pt engages in conversation  Pt reports while she was recently at [address] [place] [address], the doctor was trying to make med changes but it still isn't working  Pt reports feeling "very nervous"  Pt maintains a conversation but once this writer leaves the room pt begins screaming out  Pt appears agitated but is not aggressive  Pt is seeking IP treatment at this time and is willing to sign a 201  Dr Ada Schreiber and pt have completed 201 document  Pt has been advised of 201 rights  Pt is requesting to receive IP treatment at [facility]  Pt has been advised, Carmen Lorenzo will need to inquire on bed availability      TDS, CW

## 2019-10-21 NOTE — LETTER
[address] [facility] [address]  [address] [facility] [address]  Dept: [phone]      EMTALA TRANSFER CONSENT    NAME Kadi Padilla                                         1970                           MRN [id number]    I have been informed of my rights regarding examination, treatment, and transfer   by Dr Debbie Orozco: Specialized equipment and/or services available at the receiving facility (Include comment)________________________    Risks: Potential for delay in receiving treatment      Consent for Transfer:  I acknowledge that my medical condition has been evaluated and explained to me by the emergency department physician or other qualified medical person and/or my attending physician, who has recommended that I be transferred to the service of  Accepting Physician: Dr Yelena Brink at [address] [facility] [address], [address] : [facility] [place], [address] [address] [facility] [address] [address]  The above potential benefits of such transfer, the potential risks associated with such transfer, and the probable risks of not being transferred have been explained to me, and I fully understand them  The doctor has explained that, in my case, the benefits of transfer outweigh the risks  I agree to be transferred  I authorize the performance of emergency medical procedures and treatments upon me in both transit and upon arrival at the receiving facility  Additionally, I authorize the release of any and all medical records to the receiving facility and request they be transported with me, if possible  I understand that the safest mode of transportation during a medical emergency is an ambulance and that the Hospital advocates the use of this mode of transport   Risks of traveling to the receiving facility by car, including absence of medical control, life sustaining equipment, such as oxygen, and medical personnel has been explained to me and I fully understand them     ([address])  [X]  I consent to the stated transfer and to be transported by ambulance/helicopter  [  ]  I consent to the stated transfer, but refuse transportation by ambulance and accept full responsibility for my transportation by car  I understand the risks of non-ambulance transfers and I exonerate the Hospital and its staff from any deterioration in my condition that results from this refusal     X___________________________________________    DATE  10/21/19  TIME________  Signature of patient or legally responsible individual signing on patient behalf           RELATIONSHIP TO PATIENT_________________________          Provider Certification    NAME Mich Priest                                         1970                              MRN [id number]    A medical screening exam was performed on the above named patient  Based on the examination:    Condition Necessitating Transfer There were no encounter diagnoses  Patient Condition: The patient has been stabilized such that within reasonable medical probability, no material deterioration of the patient condition or the condition of the unborn child(yeni) is likely to result from the transfer    Reason for Transfer: Level of Care needed not available at this facility    Transfer Requirements: Facility [facility] [place], [address] [facility] [address]   · Space available and qualified personnel available for treatment as acknowledged by Tanja Leon, [address], [phone]  · Agreed to accept transfer and to provide appropriate medical treatment as acknowledged by       Dr Obinna Teresa  · Appropriate medical records of the examination and treatment of the patient are provided at the time of transfer   [address] _______  · Transfer will be performed by qualified personnel from [facility]  and appropriate transfer equipment as required, including the use of necessary and appropriate life support measures      Provider Certification: I have examined the patient and explained the following risks and benefits of being transferred/refusing transfer to the patient/family:         Based on these reasonable risks and benefits to the patient and/or the unborn child(yeni), and based upon the information available at the time of the patients examination, I certify that the medical benefits reasonably to be expected from the provision of appropriate medical treatments at another medical facility outweigh the increasing risks, if any, to the individuals medical condition, and in the case of labor to the unborn child, from effecting the transfer      X____________________________________________ DATE 10/21/19        TIME_______      ORIGINAL - SEND TO MEDICAL RECORDS   COPY - SEND WITH PATIENT DURING TRANSFER

## 2019-10-21 NOTE — ED PROVIDER NOTES
Received sign out from prior team  Reviewed plan of care with them and will accept care of patient  Will follow up on labs and/or radiology, as well as dispo patient  Labs Reviewed   RAPID DRUG SCREEN, URINE - Abnormal       Result Value Ref Range Status    Amph/Meth UR Positive (*) Negative Final    Barbiturate Ur Negative  Negative Final    Benzodiazepine Urine Negative  Negative Final    Cocaine Urine Negative  Negative Final    Methadone Urine Negative  Negative Final    Opiate Urine Negative  Negative Final    PCP Ur Negative  Negative Final    THC Urine Negative  Negative Final    Narrative:     FOR MEDICAL PURPOSES ONLY  IF CONFIRMATION NEEDED PLEASE CONTACT THE LAB WITHIN 5 DAYS      Drug Screen Cutoff Levels:  AMPHETAMINE/METHAMPHETAMINES  1000 ng/mL  BARBITURATES     200 ng/mL  BENZODIAZEPINES     200 ng/mL  COCAINE      300 ng/mL  METHADONE      300 ng/mL  OPIATES      300 ng/mL  PHENCYCLIDINE     25 ng/mL  THC       50 ng/mL     CBC AND DIFFERENTIAL - Abnormal    WBC 9 71  4 31 - 10 16 Thousand/uL Final    RBC 4 25  3 81 - 5 12 Million/uL Final    Hemoglobin 14 2  11 5 - 15 4 g/dL Final    Hematocrit 42 6  34 8 - 46 1 % Final     (*) 82 - 98 fL Final    MCH 33 4  26 8 - 34 3 pg Final    MCHC 33 3  31 4 - 37 4 g/dL Final    RDW 12 9  11 6 - 15 1 % Final    MPV 9 0  8 9 - 12 7 fL Final    Platelets 676  903 - 390 Thousands/uL Final    nRBC 0  /100 WBCs Final    Neutrophils Relative 67  43 - 75 % Final    Immat GRANS % 0  0 - 2 % Final    Lymphocytes Relative 24  14 - 44 % Final    Monocytes Relative 7  4 - 12 % Final    Eosinophils Relative 2  0 - 6 % Final    Basophils Relative 0  0 - 1 % Final    Neutrophils Absolute 6 35  1 85 - 7 62 Thousands/µL Final    Immature Grans Absolute 0 03  0 00 - 0 20 Thousand/uL Final    Lymphocytes Absolute 2 35  0 60 - 4 47 Thousands/µL Final    Monocytes Absolute 0 71  0 17 - 1 22 Thousand/µL Final    Eosinophils Absolute 0 23  0 00 - 0 61 Thousand/µL Final Basophils Absolute 0 04  0 00 - 0 10 Thousands/µL Final   COMPREHENSIVE METABOLIC PANEL - Abnormal    Sodium 142  136 - 145 mmol/L Final    Potassium 4 1  3 5 - 5 3 mmol/L Final    Chloride 102  100 - 108 mmol/L Final    CO2 30  21 - 32 mmol/L Final    ANION GAP 10  4 - 13 mmol/L Final    BUN 5  5 - 25 mg/dL Final    Creatinine 0 62  0 60 - 1 30 mg/dL Final    Comment: Standardized to IDMS reference method    Glucose 103  65 - 140 mg/dL Final    Comment:   If the patient is fasting, the ADA then defines impaired fasting glucose as > 100 mg/dL and diabetes as > or equal to 123 mg/dL  Specimen collection should occur prior to Sulfasalazine administration due to the potential for falsely depressed results  Specimen collection should occur prior to Sulfapyridine administration due to the potential for falsely elevated results  Calcium 9 4  8 3 - 10 1 mg/dL Final    AST 18  5 - 45 U/L Final    Comment:   Specimen collection should occur prior to Sulfasalazine administration due to the potential for falsely depressed results  ALT 18  12 - 78 U/L Final    Comment:   Specimen collection should occur prior to Sulfasalazine administration due to the potential for falsely depressed results       Alkaline Phosphatase 79  46 - 116 U/L Final    Total Protein 7 1  6 4 - 8 2 g/dL Final    Albumin 3 3 (*) 3 5 - 5 0 g/dL Final    Total Bilirubin 0 10 (*) 0 20 - 1 00 mg/dL Final    eGFR 107  ml/min/1 73sq m Final    Narrative:     [place] guidelines for Chronic Kidney Disease (CKD):     Stage 1 with normal or high GFR (GFR > 90 mL/min/1 73 square meters)    Stage 2 Mild CKD (GFR = 60-89 mL/min/1 73 square meters)    Stage 3A Moderate CKD (GFR = 45-59 mL/min/1 73 square meters)    Stage 3B Moderate CKD (GFR = 30-44 mL/min/1 73 square meters)    Stage 4 Severe CKD (GFR = 15-29 mL/min/1 73 square meters)    Stage 5 End Stage CKD (GFR <15 mL/min/1 73 square meters)  Note: GFR calculation is accurate only with a steady state creatinine   TSH, 3RD GENERATION - Normal    TSH 3RD GENERATON 2 408  0 358 - 3 740 uIU/mL Final    Comment:   The recommended reference ranges for TSH during pregnancy are as follows:   First trimester 0 1 to 2 5 uIU/mL   Second trimester  0 2 to 3 0 uIU/mL   Third trimester 0 3 to 3 0 uIU/m    Note: Normal ranges may not apply to patients who are transgender, non-binary, or whose legal sex, sex at birth, and gender identity differ  Using supplements with high doses of biotin 20 to more than 300 times greater than the adequate daily intake for adults of 30 mcg/day as established by the [place] [employer], can cause falsely depress results  Narrative:     Patients undergoing fluorescein dye angiography may retain small amounts of fluorescein in the body for 48-72 hours post procedure  Samples containing fluorescein can produce falsely depressed TSH values  If the patient had this procedure,a specimen should be resubmitted post fluorescein clearance  PREGNANCY TEST (HCG QUALITATIVE) - Normal    Preg, Serum Negative  Negative Final   POCT ALCOHOL BREATH TEST - Normal    EXTBreath Alcohol 0 0   Final   UA W REFLEX TO MICROSCOPIC WITH REFLEX TO CULTURE    Color, UA Yellow   Final    Clarity, UA Clear   Final    Specific [place], UA 1 015  1 003 - 1 030 Final    pH, UA 5 0  4 5, 5 0, 5 5, 6 0, 6 5, 7 0, 7 5, 8 0 Final    Leukocytes, UA Negative  Negative Final    Nitrite, UA Negative  Negative Final    Protein, UA Negative  Negative mg/dl Final    Glucose, UA Negative  Negative mg/dl Final    Ketones, UA Negative  Negative mg/dl Final    Urobilinogen, UA 0 2  0 2, 1 0 E U /dl E U /dl Final    Bilirubin, UA Negative  Negative Final    Blood, UA Negative  Negative Final     Patient is medically clear for inpatient mental health treatment         Michell Nelson DO  10/22/19 0110
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
## 2019-10-21 NOTE — LETTER
[address] [facility] [address]  [address] [facility] [address]  Dept: [phone]                                                            EMTALA TRANSFER CONSENT    NAME Elliott Dias                           1970                              MRN [id number]    I have been informed of my rights regarding examination, treatment, and transfer   by Dr Vi Cunningham, *    Benefits: Specialized equipment and/or services available at the receiving facility (Include comment)________________________    Risks: Potential for delay in receiving treatment    Consent for Transfer:  I acknowledge that my medical condition has been evaluated and explained to me by the emergency department physician or other qualified medical person and/or my attending physician, who has recommended that I be transferred to the service of  Accepting Physician: Dr Tapan Logan at [address] [facility] [address], [address] : [facility], [place], [address]  [address] [facility] [address]  The above potential benefits of such transfer, the potential risks associated with such transfer, and the probable risks of not being transferred have been explained to me, and I fully understand them  The doctor has explained that, in my case, the benefits of transfer outweigh the risks  I agree to be transferred  I authorize the performance of emergency medical procedures and treatments upon me in both transit and upon arrival at the receiving facility  Additionally, I authorize the release of any and all medical records to the receiving facility and request they be transported with me, if possible  I understand that the safest mode of transportation during a medical emergency is an ambulance and that the Hospital advocates the use of this mode of transport   Risks of traveling to the receiving facility by car, including absence of medical control, life sustaining equipment, such as oxygen, and medical personnel has been explained to me and I fully understand them  (OSEI CORRECT BOX BELOW)  [X]  I consent to the stated transfer and to be transported by ambulance/helicopter  [  ]  I consent to the stated transfer, but refuse transportation by ambulance and accept full responsibility for my transportation by car  I understand the risks of non-ambulance transfers and I exonerate the Hospital and its staff from any deterioration in my condition that results from this refusal     X___________________________________________    DATE  10/22/19  TIME________  Signature of patient or legally responsible individual signing on patient behalf           RELATIONSHIP TO PATIENT_________________________                      Provider Certification    NAME Judy Bob                           1970                              MRN [id number]    A medical screening exam was performed on the above named patient  Based on the examination:    Condition Necessitating Transfer The encounter diagnosis was Psychoses ([address] )      Patient Condition: The patient has been stabilized such that within reasonable medical probability, no material deterioration of the patient condition or the condition of the unborn child(yeni) is likely to result from the transfer    Reason for Transfer: Level of Care needed not available at this facility    Transfer Requirements: Facility [facility], [place], [address] [id number]   · Space available and qualified personnel available for treatment as acknowledged by Erin Claudio, [address], [phone]  · Agreed to accept transfer and to provide appropriate medical treatment as acknowledged by       Dr Bijal Bajwa  · Appropriate medical records of the examination and treatment of the patient are provided at the time of transfer   [address] __JG_____  · Transfer will be performed by qualified personnel from [facility]  and appropriate transfer equipment as required, including the use of necessary and appropriate life support measures  Provider Certification: I have examined the patient and explained the following risks and benefits of being transferred/refusing transfer to the patient/family:  General risk, such as traffic hazards, adverse weather conditions, rough terrain or turbulence, possible failure of equipment (including vehicle or aircraft), or consequences of actions of persons outside the control of the transport personnel      Based on these reasonable risks and benefits to the patient and/or the unborn child(yeni), and based upon the information available at the time of the patients examination, I certify that the medical benefits reasonably to be expected from the provision of appropriate medical treatments at another medical facility outweigh the increasing risks, if any, to the individuals medical condition, and in the case of labor to the unborn child, from effecting the transfer      X____________________________________________ DATE 10/22/19        TIME_______      ORIGINAL - SEND TO MEDICAL RECORDS   COPY - SEND WITH PATIENT DURING TRANSFER

## 2019-10-21 NOTE — ED NOTES
CW received call from [address] [place] [address] with a disposition check  CW advised Cammie Martines, pt will be receiving IP treatment and requested a member from [facility] to deliver pt's personal belongings to ED      TDS, CW

## 2019-10-21 NOTE — ED NOTES
Pt refusing the Zyprexa, states it doesn't mix well with her meds  Provider made aware   Pt made aware that provider is putting different medication in       Swetasim Zacarias RN  10/21/19 0466

## 2019-10-21 NOTE — ED NOTES
Pt ordered food , once delivered refused it  Asked if she wanted more and pt said no  Pt is also requesting medication to calm her down       Nancy Zacarias RN  10/21/19 3299

## 2019-10-22 ENCOUNTER — HOSPITAL ENCOUNTER (INPATIENT)
Facility: HOSPITAL | Age: 49
LOS: 16 days | Discharge: HOME | DRG: 750 | End: 2019-11-07
Attending: PSYCHIATRY & NEUROLOGY | Admitting: PSYCHIATRY & NEUROLOGY
Payer: COMMERCIAL

## 2019-10-22 VITALS
RESPIRATION RATE: 18 BRPM | SYSTOLIC BLOOD PRESSURE: 111 MMHG | TEMPERATURE: 98.3 F | HEART RATE: 82 BPM | DIASTOLIC BLOOD PRESSURE: 67 MMHG | WEIGHT: 114.2 LBS | BODY MASS INDEX: 22.42 KG/M2 | HEIGHT: 60 IN | OXYGEN SATURATION: 98 %

## 2019-10-22 DIAGNOSIS — Z00.8 MEDICAL CLEARANCE FOR PSYCHIATRIC ADMISSION: ICD-10-CM

## 2019-10-22 DIAGNOSIS — F25.0 SCHIZOAFFECTIVE DISORDER, BIPOLAR TYPE (HCC): Primary | Chronic | ICD-10-CM

## 2019-10-22 PROBLEM — L02.93 CARBUNCLE: Status: ACTIVE | Noted: 2019-10-22

## 2019-10-22 PROBLEM — E78.5 DYSLIPIDEMIA: Status: ACTIVE | Noted: 2019-10-22

## 2019-10-22 LAB
ATRIAL RATE: 83 BPM
P AXIS: 70 DEGREES
PR INTERVAL: 150 MS
QRS AXIS: 71 DEGREES
QRSD INTERVAL: 62 MS
QT INTERVAL: 390 MS
QTC INTERVAL: 458 MS
T WAVE AXIS: 71 DEGREES
VENTRICULAR RATE: 83 BPM

## 2019-10-22 PROCEDURE — 99253 IP/OBS CNSLTJ NEW/EST LOW 45: CPT | Performed by: INTERNAL MEDICINE

## 2019-10-22 PROCEDURE — 99222 1ST HOSP IP/OBS MODERATE 55: CPT | Performed by: PSYCHIATRY & NEUROLOGY

## 2019-10-22 PROCEDURE — 90686 IIV4 VACC NO PRSV 0.5 ML IM: CPT | Performed by: NURSE PRACTITIONER

## 2019-10-22 PROCEDURE — 96372 THER/PROPH/DIAG INJ SC/IM: CPT

## 2019-10-22 PROCEDURE — 93010 ELECTROCARDIOGRAM REPORT: CPT | Performed by: INTERNAL MEDICINE

## 2019-10-22 RX ORDER — PRAVASTATIN SODIUM 40 MG
40 TABLET ORAL
Status: DISCONTINUED | OUTPATIENT
Start: 2019-10-22 | End: 2019-10-22

## 2019-10-22 RX ORDER — ACETAMINOPHEN 325 MG/1
650 TABLET ORAL EVERY 6 HOURS PRN
Status: DISCONTINUED | OUTPATIENT
Start: 2019-10-22 | End: 2019-11-07 | Stop reason: HOSPADM

## 2019-10-22 RX ORDER — RISPERIDONE 1 MG/1
1 TABLET, ORALLY DISINTEGRATING ORAL EVERY 6 HOURS PRN
Status: DISCONTINUED | OUTPATIENT
Start: 2019-10-22 | End: 2019-11-07 | Stop reason: HOSPADM

## 2019-10-22 RX ORDER — GABAPENTIN 400 MG/1
400 CAPSULE ORAL 3 TIMES DAILY
COMMUNITY
End: 2019-11-07 | Stop reason: HOSPADM

## 2019-10-22 RX ORDER — LORAZEPAM 1 MG/1
1 TABLET ORAL ONCE
Status: COMPLETED | OUTPATIENT
Start: 2019-10-22 | End: 2019-10-22

## 2019-10-22 RX ORDER — LORAZEPAM 2 MG/ML
2 INJECTION INTRAMUSCULAR EVERY 4 HOURS PRN
Status: DISCONTINUED | OUTPATIENT
Start: 2019-10-22 | End: 2019-11-07 | Stop reason: HOSPADM

## 2019-10-22 RX ORDER — OLANZAPINE 10 MG/1
10 INJECTION, POWDER, LYOPHILIZED, FOR SOLUTION INTRAMUSCULAR ONCE
Status: COMPLETED | OUTPATIENT
Start: 2019-10-22 | End: 2019-10-22

## 2019-10-22 RX ORDER — HYDROXYZINE HYDROCHLORIDE 25 MG/1
25 TABLET, FILM COATED ORAL EVERY 6 HOURS PRN
Status: DISCONTINUED | OUTPATIENT
Start: 2019-10-22 | End: 2019-10-26

## 2019-10-22 RX ORDER — RISPERIDONE 1 MG/1
1 TABLET, FILM COATED ORAL DAILY
Status: DISCONTINUED | OUTPATIENT
Start: 2019-10-23 | End: 2019-10-23

## 2019-10-22 RX ORDER — RISPERIDONE 1 MG/1
1 TABLET, FILM COATED ORAL 2 TIMES DAILY
Status: DISCONTINUED | OUTPATIENT
Start: 2019-10-22 | End: 2019-10-22

## 2019-10-22 RX ORDER — MAGNESIUM HYDROXIDE/ALUMINUM HYDROXICE/SIMETHICONE 120; 1200; 1200 MG/30ML; MG/30ML; MG/30ML
30 SUSPENSION ORAL EVERY 4 HOURS PRN
Status: DISCONTINUED | OUTPATIENT
Start: 2019-10-22 | End: 2019-11-07 | Stop reason: HOSPADM

## 2019-10-22 RX ORDER — HALOPERIDOL 5 MG
5 TABLET ORAL EVERY 6 HOURS PRN
Status: DISCONTINUED | OUTPATIENT
Start: 2019-10-22 | End: 2019-10-30

## 2019-10-22 RX ORDER — BENZTROPINE MESYLATE 1 MG/ML
1 INJECTION INTRAMUSCULAR; INTRAVENOUS EVERY 6 HOURS PRN
Status: DISCONTINUED | OUTPATIENT
Start: 2019-10-22 | End: 2019-11-07 | Stop reason: HOSPADM

## 2019-10-22 RX ORDER — HALOPERIDOL 5 MG/ML
5 INJECTION INTRAMUSCULAR EVERY 6 HOURS PRN
Status: DISCONTINUED | OUTPATIENT
Start: 2019-10-22 | End: 2019-10-22

## 2019-10-22 RX ORDER — GABAPENTIN 400 MG/1
400 CAPSULE ORAL 3 TIMES DAILY
Status: DISCONTINUED | OUTPATIENT
Start: 2019-10-22 | End: 2019-10-23

## 2019-10-22 RX ORDER — BENZTROPINE MESYLATE 1 MG/1
1 TABLET ORAL EVERY 6 HOURS PRN
Status: DISCONTINUED | OUTPATIENT
Start: 2019-10-22 | End: 2019-11-07 | Stop reason: HOSPADM

## 2019-10-22 RX ORDER — HYDROXYZINE HYDROCHLORIDE 25 MG/1
50 TABLET, FILM COATED ORAL EVERY 4 HOURS PRN
Status: DISCONTINUED | OUTPATIENT
Start: 2019-10-22 | End: 2019-11-07 | Stop reason: HOSPADM

## 2019-10-22 RX ORDER — BACITRACIN, NEOMYCIN, POLYMYXIN B 400; 3.5; 5 [USP'U]/G; MG/G; [USP'U]/G
1 OINTMENT TOPICAL 2 TIMES DAILY
Status: DISCONTINUED | OUTPATIENT
Start: 2019-10-22 | End: 2019-11-07 | Stop reason: HOSPADM

## 2019-10-22 RX ORDER — HALOPERIDOL 5 MG/ML
5 INJECTION INTRAMUSCULAR EVERY 6 HOURS PRN
Status: DISCONTINUED | OUTPATIENT
Start: 2019-10-22 | End: 2019-10-30

## 2019-10-22 RX ORDER — OLANZAPINE 10 MG/1
10 INJECTION, POWDER, LYOPHILIZED, FOR SOLUTION INTRAMUSCULAR
Status: DISCONTINUED | OUTPATIENT
Start: 2019-10-22 | End: 2019-11-07 | Stop reason: HOSPADM

## 2019-10-22 RX ORDER — PRAVASTATIN SODIUM 20 MG
20 TABLET ORAL
Status: DISCONTINUED | OUTPATIENT
Start: 2019-10-22 | End: 2019-11-07 | Stop reason: HOSPADM

## 2019-10-22 RX ORDER — HALOPERIDOL 5 MG
5 TABLET ORAL EVERY 6 HOURS PRN
Status: DISCONTINUED | OUTPATIENT
Start: 2019-10-22 | End: 2019-10-22

## 2019-10-22 RX ORDER — LORAZEPAM 0.5 MG/1
0.5 TABLET ORAL 2 TIMES DAILY
Status: DISCONTINUED | OUTPATIENT
Start: 2019-10-22 | End: 2019-10-23

## 2019-10-22 RX ORDER — ACETAMINOPHEN 325 MG/1
975 TABLET ORAL EVERY 6 HOURS PRN
Status: DISCONTINUED | OUTPATIENT
Start: 2019-10-22 | End: 2019-11-07 | Stop reason: HOSPADM

## 2019-10-22 RX ORDER — TRAZODONE HYDROCHLORIDE 50 MG/1
50 TABLET ORAL
Status: DISCONTINUED | OUTPATIENT
Start: 2019-10-22 | End: 2019-11-07 | Stop reason: HOSPADM

## 2019-10-22 RX ORDER — ACETAMINOPHEN 325 MG/1
650 TABLET ORAL EVERY 6 HOURS PRN
Status: DISCONTINUED | OUTPATIENT
Start: 2019-10-22 | End: 2019-10-22

## 2019-10-22 RX ORDER — LORAZEPAM 0.5 MG/1
0.5 TABLET ORAL ONCE
Status: COMPLETED | OUTPATIENT
Start: 2019-10-22 | End: 2019-10-22

## 2019-10-22 RX ORDER — ACETAMINOPHEN 325 MG/1
650 TABLET ORAL EVERY 4 HOURS PRN
Status: DISCONTINUED | OUTPATIENT
Start: 2019-10-22 | End: 2019-11-07 | Stop reason: HOSPADM

## 2019-10-22 RX ADMIN — LORAZEPAM 0.5 MG: 0.5 TABLET ORAL at 17:28

## 2019-10-22 RX ADMIN — PRAVASTATIN SODIUM 20 MG: 20 TABLET ORAL at 15:36

## 2019-10-22 RX ADMIN — GABAPENTIN 600 MG: 300 CAPSULE ORAL at 07:53

## 2019-10-22 RX ADMIN — ACETAMINOPHEN 975 MG: 325 TABLET ORAL at 20:05

## 2019-10-22 RX ADMIN — LORAZEPAM 2 MG: 2 INJECTION INTRAMUSCULAR; INTRAVENOUS at 20:04

## 2019-10-22 RX ADMIN — WATER 2.1 ML: 1 INJECTION INTRAMUSCULAR; INTRAVENOUS; SUBCUTANEOUS at 01:03

## 2019-10-22 RX ADMIN — RISPERIDONE 3 MG: 1 TABLET ORAL at 21:23

## 2019-10-22 RX ADMIN — OLANZAPINE 10 MG: 10 INJECTION, POWDER, FOR SOLUTION INTRAMUSCULAR at 01:03

## 2019-10-22 RX ADMIN — RISPERIDONE 1 MG: 1 TABLET ORAL at 07:53

## 2019-10-22 RX ADMIN — GABAPENTIN 400 MG: 400 CAPSULE ORAL at 21:21

## 2019-10-22 RX ADMIN — RISPERIDONE 1 MG: 1 TABLET, ORALLY DISINTEGRATING ORAL at 15:36

## 2019-10-22 RX ADMIN — LORAZEPAM 1 MG: 1 TABLET ORAL at 02:19

## 2019-10-22 RX ADMIN — GABAPENTIN 400 MG: 400 CAPSULE ORAL at 15:59

## 2019-10-22 RX ADMIN — INFLUENZA VIRUS VACCINE 0.5 ML: 15; 15; 15; 15 SUSPENSION INTRAMUSCULAR at 15:59

## 2019-10-22 RX ADMIN — LORAZEPAM 0.5 MG: 0.5 TABLET ORAL at 08:14

## 2019-10-22 NOTE — PROGRESS NOTES
Pt remains in her room, pt appears to remain labile and irritable  Pt has been yelling out, pt is aware that she is hallucinating and she reported, " I am a lady and you know I don't act this way "  Pt denies any si/ hi at this time  Will continue to monitor

## 2019-10-22 NOTE — ED NOTES
Received report from Jimena Butler RN  Patient is appears to be sleeping  Patient showing no signs of distress  Will continue to monitor       Melanie Perez  10/21/19 5232

## 2019-10-22 NOTE — PROGRESS NOTES
Pt arrived on the unit, pt remained calm and cooperative during the assessment  Pt reported that she feels that she "cannot get away from these people that try to take my money and medications " pt was screaming in her bathroom about someone putting makeup on her face "  Pt however was able to remain calm and cooperative during the rest of the interaction  Pt denies any current issues at this time

## 2019-10-22 NOTE — ED NOTES
Pt asking again for her medications  Pt was told that Medications are not due until 0800, per Charge  Pt states that she did not receive any medications last night, Charge notified   Pt given menu to order breakfast       Janine Goltz  10/22/19 6582

## 2019-10-22 NOTE — PROGRESS NOTES
SHELIA, C V  & K D , went through patient personal belongings with patient present     The following items are present in patient room:   Shoes x 1 pair   Tank Top x 1    T-shirt x 2   Long Sleeve shirt x 2   Pants x 3   PJ Bottoms x 1   Underwear x 4     The following items are in patient storage area:   Jacket x 1   Dress x 1   Orange Bag x 1   Pink Butterfly Bag x 1   Underwear x 9   Hat x 1   PJ Bottoms x 1    Grey Hooded shirt x 1   Tank (spaghetti strap) x 5    The following items are in patient contraband:   Cell phone x 1    (block and cord) x 1   Box of cigarette filters x 1   Bag of tobacco and container    Cigarette machine x 1    The following items are in patient safe:  Safe Bag #[id number]   Lottery scratch off x 1   $4 51   Black bag   PA EBT card   PA ID Card    [employer] card x1

## 2019-10-22 NOTE — ED NOTES
CW notes transportation paperwork complete  CW unable to obtain pre-authorization for transport at this time  CW must contact during normal business hours (M-F, 8am-5pm)  CW placed call to [phone] and left VM requesting return call       TDS, CW

## 2019-10-22 NOTE — ED NOTES
CW received call from Chitra Mckeon of [address] [facility]  CW provided updates and requested Chitra Mckeon speak w/[facility] regarding pt's personal belongings / clothing, etc    Carmen Lorenzo will provide [facility] on-call w/update once placement is obtained      TDS, CW

## 2019-10-22 NOTE — ED NOTES
Patient now complaining of SI and feels her speech is now slurred  Requesting to speak to her nurse  RN notified        Karina Fortune  10/22/19 0140

## 2019-10-22 NOTE — ED NOTES
Pt had more belongings dropped off by an [facility] member Analilia Hernandez)  Placed in patients locker    Tobacco   Cigarettes  3 dollars  Lip gloss  Cards   Lotion(2 bottles)  5 pairs of pants  7 shirts  1 Dress  Hat  13 Pairs underwear  Pads  Bodywash      Brandy Zacarias RN  10/21/19 5570

## 2019-10-22 NOTE — ED NOTES
CW received return call from [facility] w/[facility]NISHA  Pt is authorized for 3 days, today, 10/21/2019 through Wednesday, 10/23/2019, being last day covered and date of review     CW to place call to [facility] once placement is secured to obtain auth #     TDS, CW

## 2019-10-22 NOTE — ED NOTES
Patient is now complaining of leg pain and the "pain is worse than ever"  Requesting medication, states she takes Neurontin for the pain  Notified RN  Patient is still requesting ResperDal which she states she takes       Inga Callejas  10/22/19 Matt Luna  10/22/19 0230
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
## 2019-10-22 NOTE — TREATMENT PLAN
Treatment Plan Julio 51 y o  female MRN: [id number]    [facility] 813-22 Encounter: [id number]          Admit Date/Time:  10/22/2019 11:42 AM    Treatment Team: Attending Provider: Domingo Edwards MD; Recreational Therapist: Estuardo Kendall; Licensed Practical Nurse: Judit Zhu LPN; Patient Care Technician: Erin Larson    Diagnosis: Principal Problem:    Schizoaffective disorder, bipolar type ([facility] )  Active Problems:    Uncomplicated alcohol dependence ([facility] )    Medical clearance for psychiatric admission    Dyslipidemia    Carbuncle        Patient Strengths: active sense of humor, communication skills, special hobby/interest     Patient Barriers: homeless, substance abuse    Progress Toward Goals: ongoing    Recommended Treatment: discharge planning     Treatment Frequency: 2-3 times per week     Expected Discharge Date: 10/28/2019    Discharge Plan: pending    Treatment Plan Created/Updated By: Domingo Edwards MD

## 2019-10-22 NOTE — ED NOTES
CW received call from Nelly Soto w/intake  Pt has been accepted to [facility] under the care of Dr Lynnette Louise  Pt may arrive anytime  [facility], Tuesday morning, 10/22/2019  CW placed call to [facility], tamra w/Adal  Request for return call placed in queue      TDS, BERANRD

## 2019-10-22 NOTE — ED NOTES
Transport moved up to 10am by Saritha Christian at [facility]  Intake aware       Lloyd Meléndez, LEONARDO  10/22/19  5277

## 2019-10-22 NOTE — ED NOTES
CW received return call from [facility]  CW requested authorized date change due to pt's ETA on 10/22/2019  As per Roe pt is authorized for 3 days, 10/22/2019-10/24/2019, w/10/24/2019 being last day covered and date of review  Auth  #:[id number]  CW placed call to intake and left VM w/pt's auth information      TDS, CW

## 2019-10-22 NOTE — ED NOTES
Patient is now quiet and appears to be sleeping  Will continue to monitor       Inga Callejas  10/22/19 5883

## 2019-10-22 NOTE — ED NOTES
Patient continues to scream at person or persons when there is no one in the room  Patient is having AH and acting out toward the voices she is hearing  Patient is not being aggressive towards staff       Merline Pancake  10/22/19 8846

## 2019-10-22 NOTE — ED NOTES
Took over Observation from [facility]  Pt appears to be asleep at this time  Sitter remains on Continual Observation        Gomez Query  10/22/19 2035
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
## 2019-10-22 NOTE — ED NOTES
CW placed call to [facility], [phone], requesting return call for pre-cert  Request placed in queue      BERNARD VENEGAS

## 2019-10-22 NOTE — ED NOTES
Pt breakfast tray delivered  Will attempt vital signs when Pt is done eating        Janine Goltz  10/22/19 9496

## 2019-10-22 NOTE — EMTALA/ACUTE CARE TRANSFER
[address] [facility] [address]  [address] [facility] [address] [address]  Dept: [phone]      EMTALA TRANSFER CONSENT    NAME Toni SHARIF 1970                              MRN [id number]    I have been informed of my rights regarding examination, treatment, and transfer   by Dr Stroud Sers: Specialized equipment and/or services available at the receiving facility (Include comment)________________________    Risks: Potential for delay in receiving treatment      Consent for Transfer:  I acknowledge that my medical condition has been evaluated and explained to me by the emergency department physician or other qualified medical person and/or my attending physician, who has recommended that I be transferred to the service of  Accepting Physician: Dr Jose Hidalgo at [address] [facility] Name, [address] : [facility], [place], [address] [facility] [address] [address] [address] [facility] [address] [place] [facility] [address]  The above potential benefits of such transfer, the potential risks associated with such transfer, and the probable risks of not being transferred have been explained to me, and I fully understand them  The doctor has explained that, in my case, the benefits of transfer outweigh the risks  I agree to be transferred  I authorize the performance of emergency medical procedures and treatments upon me in both transit and upon arrival at the receiving facility  Additionally, I authorize the release of any and all medical records to the receiving facility and request they be transported with me, if possible  I understand that the safest mode of transportation during a medical emergency is an ambulance and that the Hospital advocates the use of this mode of transport   Risks of traveling to the receiving facility by car, including absence of medical control, life sustaining equipment, such as oxygen, and medical personnel has been explained to me and I fully understand them     ([address] [facility])  [  ]  I consent to the stated transfer and to be transported by ambulance/helicopter  [  ]  I consent to the stated transfer, but refuse transportation by ambulance and accept full responsibility for my transportation by car  I understand the risks of non-ambulance transfers and I exonerate the Hospital and its staff from any deterioration in my condition that results from this refusal     X___________________________________________    DATE  10/22/19  TIME________  Signature of patient or legally responsible individual signing on patient behalf           RELATIONSHIP TO PATIENT_________________________          Provider Certification    NAME Lucy Mcmanus                                         1970                              MRN [id number]    A medical screening exam was performed on the above named patient  Based on the examination:    Condition Necessitating Transfer The encounter diagnosis was Psychoses ([address] )  Patient Condition: The patient has been stabilized such that within reasonable medical probability, no material deterioration of the patient condition or the condition of the unborn child(yeni) is likely to result from the transfer    Reason for Transfer: Level of Care needed not available at this facility    Transfer Requirements: Facility [facility], [place], [address] [facility] [address]   · Space available and qualified personnel available for treatment as acknowledged by Dilan Castellanos, [address], [phone]  · Agreed to accept transfer and to provide appropriate medical treatment as acknowledged by       Dr Ladarius Avila  · Appropriate medical records of the examination and treatment of the patient are provided at the time of transfer   [address] _______  · Transfer will be performed by qualified personnel from [facility]  and appropriate transfer equipment as required, including the use of necessary and appropriate life support measures      Provider Certification: I have examined the patient and explained the following risks and benefits of being transferred/refusing transfer to the patient/family:         Based on these reasonable risks and benefits to the patient and/or the unborn child(yeni), and based upon the information available at the time of the patients examination, I certify that the medical benefits reasonably to be expected from the provision of appropriate medical treatments at another medical facility outweigh the increasing risks, if any, to the individuals medical condition, and in the case of labor to the unborn child, from effecting the transfer      X____________________________________________ DATE 10/22/19        TIME_______      ORIGINAL - SEND TO MEDICAL RECORDS   COPY - SEND WITH PATIENT DURING TRANSFER

## 2019-10-22 NOTE — ED NOTES
Pt food tray ordered  Pt appears to be responding to internal stimuli  Pt can be heard cursing, and speaking to herself  Pt will occasionally yell out and hit her pillow, but will then become silent        Can Liriano  10/22/19 6392

## 2019-10-22 NOTE — ED NOTES
Patient is accepted at [facility]  Patient is accepted by Dr Ktaty Bassett  Transportation is arranged with Maria Teresa Brown at [facility]  Transportation is scheduled for 10am       Nurse report is to be called to [phone] prior to patient transfer      Rashawn Disla LMSW  10/22/19  1041

## 2019-10-22 NOTE — ED NOTES
Patient is occasionally screaming out in her sleep  Patient is showing no aggression       Merline Pancake  10/22/19 4159

## 2019-10-23 PROCEDURE — 99232 SBSQ HOSP IP/OBS MODERATE 35: CPT | Performed by: PSYCHIATRY & NEUROLOGY

## 2019-10-23 RX ORDER — NICOTINE 21 MG/24HR
1 PATCH, TRANSDERMAL 24 HOURS TRANSDERMAL DAILY
Status: DISCONTINUED | OUTPATIENT
Start: 2019-10-23 | End: 2019-11-07 | Stop reason: HOSPADM

## 2019-10-23 RX ORDER — RISPERIDONE 2 MG/1
2 TABLET, FILM COATED ORAL EVERY EVENING
Status: DISCONTINUED | OUTPATIENT
Start: 2019-10-23 | End: 2019-10-25

## 2019-10-23 RX ORDER — RISPERIDONE 1 MG/1
1 TABLET, ORALLY DISINTEGRATING ORAL 2 TIMES DAILY
Status: DISCONTINUED | OUTPATIENT
Start: 2019-10-23 | End: 2019-10-25

## 2019-10-23 RX ORDER — DIAPER,BRIEF,INFANT-TODD,DISP
EACH MISCELLANEOUS 4 TIMES DAILY PRN
Status: DISCONTINUED | OUTPATIENT
Start: 2019-10-23 | End: 2019-11-07 | Stop reason: HOSPADM

## 2019-10-23 RX ORDER — LORAZEPAM 1 MG/1
1 TABLET ORAL 2 TIMES DAILY
Status: DISCONTINUED | OUTPATIENT
Start: 2019-10-23 | End: 2019-10-24

## 2019-10-23 RX ORDER — GABAPENTIN 300 MG/1
600 CAPSULE ORAL 3 TIMES DAILY
Status: DISCONTINUED | OUTPATIENT
Start: 2019-10-23 | End: 2019-10-31

## 2019-10-23 RX ADMIN — LORAZEPAM 1 MG: 1 TABLET ORAL at 17:59

## 2019-10-23 RX ADMIN — BACITRACIN ZINC, NEOMYCIN, POLYMYXIN B 1 LARGE APPLICATION: 400; 3.5; 5 OINTMENT TOPICAL at 17:59

## 2019-10-23 RX ADMIN — RISPERIDONE 1 MG: 1 TABLET ORAL at 09:14

## 2019-10-23 RX ADMIN — BACITRACIN ZINC, NEOMYCIN, POLYMYXIN B 1 LARGE APPLICATION: 400; 3.5; 5 OINTMENT TOPICAL at 09:15

## 2019-10-23 RX ADMIN — PRAVASTATIN SODIUM 20 MG: 20 TABLET ORAL at 15:57

## 2019-10-23 RX ADMIN — RISPERIDONE 1 MG: 1 TABLET, ORALLY DISINTEGRATING ORAL at 13:33

## 2019-10-23 RX ADMIN — LORAZEPAM 2 MG: 2 INJECTION INTRAMUSCULAR; INTRAVENOUS at 19:46

## 2019-10-23 RX ADMIN — HYDROCORTISONE: 10 CREAM TOPICAL at 15:05

## 2019-10-23 RX ADMIN — RISPERIDONE 2 MG: 2 TABLET ORAL at 17:59

## 2019-10-23 RX ADMIN — GABAPENTIN 600 MG: 300 CAPSULE ORAL at 15:57

## 2019-10-23 RX ADMIN — LORAZEPAM 0.5 MG: 0.5 TABLET ORAL at 09:15

## 2019-10-23 RX ADMIN — NICOTINE 1 PATCH: 14 PATCH, EXTENDED RELEASE TRANSDERMAL at 10:41

## 2019-10-23 RX ADMIN — GABAPENTIN 400 MG: 400 CAPSULE ORAL at 09:14

## 2019-10-23 NOTE — PROGRESS NOTES
Patient controlled during the second portion of shift  Out to nursing station 3 times  No requests  Short period of mumbling and cursing to self  Maintained on q 7 minute  No suicidal ideations  Will continue to monitor

## 2019-10-23 NOTE — PROGRESS NOTES
Pt agreeable to blood work this morning, but then asked if it had to be fasting  Author stated usually a lipid is fasting  Pt then asked if "could I get it drawn tomorrow morning because I had a cup of coffee over night that I saved from dinner " Pt stated "I really want to make sure my lab work is accurate and want to get a lot of things checked " Informed patient that I could draw the lab work tomorrow morning  Pt agreed and thankful  Will pass onto the next shift  Nurses made aware
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
## 2019-10-23 NOTE — PROGRESS NOTES
AT met with PT to complete self assessment  PT is familiar with art therapy groups and with AT from multiple prior stays on the unit  PT was pleasant and cooperative throughout  PT reports her biggest stressor leading to this admission as being not feeling safe anywhere that she has lived for the past few years  PT stated that this current stressor has changed her ability to do the things that are important to her in her life most of the time  PT stated what she likes least about her life is her relationship with people that blame her for things and feeling unstable in her life and her mental illness  PT stated that what she likes most about her life is her relationship with her ex-fiance and her oldest daughter  PT completed high school and some college, is currently on disability  PT identified reading, gardening, music, singing and exercising as her healthy coping skills of choice  PT reported a desire to work on improving self esteem, community support and resources, and helping her family to understand her illness while she is here and to prepare her for discharge  PT stated that she will know when she is ready for discharge when she is approved for a group home to live in with a supervised worker so that she feels safe  PT signed form and was given copy

## 2019-10-23 NOTE — PLAN OF CARE
Problem: Ineffective Coping  Goal: Participates in unit activities  Description  Interventions:  - Provide therapeutic environment   - Provide required programming   - Redirect inappropriate behaviors   Outcome: Progressing     Problem: Ineffective Coping  Goal: Cooperates with admission process  Description  Interventions:   - Complete admission process  Outcome: Progressing  Goal: Identifies ineffective coping skills  Outcome: Progressing  Goal: Identifies healthy coping skills  Outcome: Progressing  Goal: Demonstrates healthy coping skills  Outcome: Progressing  Goal: Patient/Family participate in treatment and DC plans  Description  Interventions:  - Provide therapeutic environment  Outcome: Progressing  Goal: Patient/Family verbalizes awareness of resources  Outcome: Progressing  Goal: Understands least restrictive measures  Description  Interventions:  - Utilize least restrictive behavior  Outcome: Progressing  Goal: Free from restraint events  Description  - Utilize least restrictive measures   - Provide behavioral interventions   - Redirect inappropriate behaviors   Outcome: Progressing     Problem: Depression  Goal: Treatment Goal: Demonstrate behavioral control of depressive symptoms, verbalize feelings of improved mood/affect, and adopt new coping skills prior to discharge  Outcome: Progressing  Goal: Verbalize thoughts and feelings  Description  Interventions:  - Assess and re-assess patient's level of risk   - Engage patient in 1:1 interactions, daily, for a minimum of 15 minutes   - Encourage patient to express feelings, fears, frustrations, hopes   Outcome: Progressing  Goal: Refrain from harming self  Description  Interventions:  - Monitor patient closely, per order   - Supervise medication ingestion, monitor effects and side effects   Outcome: Progressing  Goal: Refrain from isolation  Description  Interventions:  - Develop a trusting relationship   - Encourage socialization   Outcome: Progressing  Goal: Refrain from self-neglect  Outcome: Progressing  Goal: Attend and participate in unit activities, including therapeutic, recreational, and educational groups  Description  Interventions:  - Provide therapeutic and educational activities daily, encourage attendance and participation, and document same in the medical record   Outcome: Progressing  Goal: Complete daily ADLs, including personal hygiene independently, as able  Description  Interventions:  - Observe, teach, and assist patient with ADLS  -  Monitor and promote a balance of rest/activity, with adequate nutrition and elimination   Outcome: Progressing     Problem: Anxiety  Goal: Anxiety is at manageable level  Description  Interventions:  - Assess and monitor patient's anxiety level  - Monitor for signs and symptoms (heart palpitations, chest pain, shortness of breath, headaches, nausea, feeling jumpy, restlessness, irritable, apprehensive)  - Collaborate with interdisciplinary team and initiate plan and interventions as ordered    - [place] patient to unit/surroundings  - Explain treatment plan  - Encourage participation in care  - Encourage verbalization of concerns/fears  - Identify coping mechanisms  - Assist in developing anxiety-reducing skills  - Administer/offer alternative therapies  - Limit or eliminate stimulants  Outcome: Progressing

## 2019-10-23 NOTE — PROGRESS NOTES
Patient mood labile as she can be pleasant in interaction to tearful over her mental health, support provided  Periodically pacing unit, no further yelling noted  Remains medication compliant and on 7" checks for safety and behaviors

## 2019-10-23 NOTE — PROGRESS NOTES
Team Members Present:   MD Jessica Spencer, JARED Lucio, RN    [facility]    Uncontrollable [facility]  Screaming / labile  Positive for meth  Benzos was effective  Homeless

## 2019-10-23 NOTE — PROGRESS NOTES
Awake, mild yelling, agreed to take shower  Taking fluids well  More focused  States she is feeling much better after sleeping most of the night  Maintained on q 7 minute checks

## 2019-10-23 NOTE — PROGRESS NOTES
Patient rolling around in her bed screaming, yelling and cursing at active command hallucinations  Unable to de-escalate  IM of Ativan 2 mg given in left shoulder

## 2019-10-23 NOTE — PROGRESS NOTES
Patient visible pacing throughout unit, periods of yelling out in response to auditory hallucinations  Patient endorsed frustration with auditory hallucinations, stating she is unable to tell what they are saying but feels her life is "ruined" because of them  Mood labile, periods of rambling speech  Patient denies SI/HI, visual hallucinations  Remains medication compliant and on 7" checks for safety and behaviors

## 2019-10-23 NOTE — PROGRESS NOTES
Patient quieter currently  Given liquids, Tylenol 975 mg at 2005 for generalized discomfort  Patient de-escalated after 2035, quieted down  Able to have snack and drink fluids  Stated "I feel like I can rest a little now "  Continues to have command hallucinations, but they have lessened  Broken sleep currently
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
## 2019-10-24 LAB
ALBUMIN SERPL BCP-MCNC: 4.1 G/DL (ref 3.5–5.7)
ALP SERPL-CCNC: 63 U/L (ref 40–150)
ALT SERPL W P-5'-P-CCNC: 13 U/L (ref 7–52)
ANION GAP SERPL CALCULATED.3IONS-SCNC: 7 MMOL/L (ref 4–13)
AST SERPL W P-5'-P-CCNC: 15 U/L (ref 13–39)
BASOPHILS # BLD AUTO: 0 THOUSANDS/ΜL (ref 0–0.1)
BASOPHILS NFR BLD AUTO: 0 % (ref 0–2)
BILIRUB SERPL-MCNC: 0.2 MG/DL (ref 0.2–1)
BUN SERPL-MCNC: 7 MG/DL (ref 7–25)
CALCIUM SERPL-MCNC: 9.4 MG/DL (ref 8.6–10.5)
CHLORIDE SERPL-SCNC: 101 MMOL/L (ref 98–107)
CHOLEST SERPL-MCNC: 203 MG/DL (ref 0–200)
CO2 SERPL-SCNC: 26 MMOL/L (ref 21–31)
CREAT SERPL-MCNC: 0.64 MG/DL (ref 0.6–1.2)
EOSINOPHIL # BLD AUTO: 0.3 THOUSAND/ΜL (ref 0–0.61)
EOSINOPHIL NFR BLD AUTO: 4 % (ref 0–5)
ERYTHROCYTE [DISTWIDTH] IN BLOOD BY AUTOMATED COUNT: 13.6 % (ref 11.5–14.5)
GFR SERPL CREATININE-BSD FRML MDRD: 106 ML/MIN/1.73SQ M
GLUCOSE SERPL-MCNC: 132 MG/DL (ref 65–99)
HCT VFR BLD AUTO: 44.3 % (ref 42–47)
HDLC SERPL-MCNC: 53 MG/DL
HGB BLD-MCNC: 14.8 G/DL (ref 12–16)
LDLC SERPL CALC-MCNC: 96 MG/DL (ref 0–100)
LYMPHOCYTES # BLD AUTO: 1.8 THOUSANDS/ΜL (ref 0.6–4.47)
LYMPHOCYTES NFR BLD AUTO: 21 % (ref 21–51)
MCH RBC QN AUTO: 33.8 PG (ref 26–34)
MCHC RBC AUTO-ENTMCNC: 33.4 G/DL (ref 31–37)
MCV RBC AUTO: 101 FL (ref 81–99)
MONOCYTES # BLD AUTO: 0.7 THOUSAND/ΜL (ref 0.17–1.22)
MONOCYTES NFR BLD AUTO: 8 % (ref 2–12)
NEUTROPHILS # BLD AUTO: 5.6 THOUSANDS/ΜL (ref 1.4–6.5)
NEUTS SEG NFR BLD AUTO: 67 % (ref 42–75)
NONHDLC SERPL-MCNC: 150 MG/DL
PLATELET # BLD AUTO: 321 THOUSANDS/UL (ref 149–390)
PMV BLD AUTO: 7.9 FL (ref 8.6–11.7)
POTASSIUM SERPL-SCNC: 4.4 MMOL/L (ref 3.5–5.5)
PROT SERPL-MCNC: 7 G/DL (ref 6.4–8.9)
RBC # BLD AUTO: 4.39 MILLION/UL (ref 3.9–5.2)
SODIUM SERPL-SCNC: 134 MMOL/L (ref 134–143)
TRIGL SERPL-MCNC: 270 MG/DL (ref 44–166)
TSH SERPL DL<=0.05 MIU/L-ACNC: 2.19 UIU/ML (ref 0.45–5.33)
WBC # BLD AUTO: 8.4 THOUSAND/UL (ref 4.8–10.8)

## 2019-10-24 PROCEDURE — 84443 ASSAY THYROID STIM HORMONE: CPT | Performed by: PSYCHIATRY & NEUROLOGY

## 2019-10-24 PROCEDURE — 80061 LIPID PANEL: CPT | Performed by: PSYCHIATRY & NEUROLOGY

## 2019-10-24 PROCEDURE — 99231 SBSQ HOSP IP/OBS SF/LOW 25: CPT | Performed by: NURSE PRACTITIONER

## 2019-10-24 PROCEDURE — 80053 COMPREHEN METABOLIC PANEL: CPT | Performed by: PSYCHIATRY & NEUROLOGY

## 2019-10-24 PROCEDURE — 85025 COMPLETE CBC W/AUTO DIFF WBC: CPT | Performed by: PSYCHIATRY & NEUROLOGY

## 2019-10-24 RX ORDER — LORAZEPAM 0.5 MG/1
0.5 TABLET ORAL 2 TIMES DAILY
Status: DISCONTINUED | OUTPATIENT
Start: 2019-10-24 | End: 2019-10-24

## 2019-10-24 RX ORDER — LORAZEPAM 1 MG/1
1 TABLET ORAL 3 TIMES DAILY
Status: DISCONTINUED | OUTPATIENT
Start: 2019-10-24 | End: 2019-10-25

## 2019-10-24 RX ADMIN — LORAZEPAM 1 MG: 1 TABLET ORAL at 13:03

## 2019-10-24 RX ADMIN — GABAPENTIN 600 MG: 300 CAPSULE ORAL at 21:33

## 2019-10-24 RX ADMIN — RISPERIDONE 1 MG: 1 TABLET, ORALLY DISINTEGRATING ORAL at 08:11

## 2019-10-24 RX ADMIN — BACITRACIN ZINC, NEOMYCIN, POLYMYXIN B 1 LARGE APPLICATION: 400; 3.5; 5 OINTMENT TOPICAL at 08:10

## 2019-10-24 RX ADMIN — LORAZEPAM 1 MG: 1 TABLET ORAL at 21:33

## 2019-10-24 RX ADMIN — PRAVASTATIN SODIUM 20 MG: 20 TABLET ORAL at 16:11

## 2019-10-24 RX ADMIN — BACITRACIN ZINC, NEOMYCIN, POLYMYXIN B 1 LARGE APPLICATION: 400; 3.5; 5 OINTMENT TOPICAL at 17:34

## 2019-10-24 RX ADMIN — HYDROCORTISONE: 10 CREAM TOPICAL at 07:09

## 2019-10-24 RX ADMIN — LORAZEPAM 1 MG: 1 TABLET ORAL at 08:11

## 2019-10-24 RX ADMIN — GABAPENTIN 600 MG: 300 CAPSULE ORAL at 08:11

## 2019-10-24 RX ADMIN — RISPERIDONE 1 MG: 1 TABLET, ORALLY DISINTEGRATING ORAL at 12:31

## 2019-10-24 RX ADMIN — RISPERIDONE 2 MG: 2 TABLET ORAL at 17:33

## 2019-10-24 RX ADMIN — GABAPENTIN 600 MG: 300 CAPSULE ORAL at 16:11

## 2019-10-24 RX ADMIN — NICOTINE 1 PATCH: 14 PATCH, EXTENDED RELEASE TRANSDERMAL at 08:11

## 2019-10-24 NOTE — PLAN OF CARE
Problem: Ineffective Coping  Goal: Participates in unit activities  Description  Interventions:  - Provide therapeutic environment   - Provide required programming   - Redirect inappropriate behaviors   Outcome: Progressing     Problem: Ineffective Coping  Goal: Cooperates with admission process  Description  Interventions:   - Complete admission process  Outcome: Progressing  Goal: Identifies ineffective coping skills  Outcome: Progressing  Goal: Identifies healthy coping skills  Outcome: Progressing  Goal: Demonstrates healthy coping skills  Outcome: Progressing  Goal: Patient/Family participate in treatment and DC plans  Description  Interventions:  - Provide therapeutic environment  Outcome: Progressing  Goal: Patient/Family verbalizes awareness of resources  Outcome: Progressing  Goal: Understands least restrictive measures  Description  Interventions:  - Utilize least restrictive behavior  Outcome: Progressing  Goal: Free from restraint events  Description  - Utilize least restrictive measures   - Provide behavioral interventions   - Redirect inappropriate behaviors   Outcome: Progressing     Problem: Depression  Goal: Treatment Goal: Demonstrate behavioral control of depressive symptoms, verbalize feelings of improved mood/affect, and adopt new coping skills prior to discharge  Outcome: Progressing  Goal: Verbalize thoughts and feelings  Description  Interventions:  - Assess and re-assess patient's level of risk   - Engage patient in 1:1 interactions, daily, for a minimum of 15 minutes   - Encourage patient to express feelings, fears, frustrations, hopes   Outcome: Progressing  Goal: Refrain from harming self  Description  Interventions:  - Monitor patient closely, per order   - Supervise medication ingestion, monitor effects and side effects   Outcome: Progressing  Goal: Refrain from isolation  Description  Interventions:  - Develop a trusting relationship   - Encourage socialization   Outcome: Progressing  Goal: Refrain from self-neglect  Outcome: Progressing  Goal: Attend and participate in unit activities, including therapeutic, recreational, and educational groups  Description  Interventions:  - Provide therapeutic and educational activities daily, encourage attendance and participation, and document same in the medical record   Outcome: Progressing  Goal: Complete daily ADLs, including personal hygiene independently, as able  Description  Interventions:  - Observe, teach, and assist patient with ADLS  -  Monitor and promote a balance of rest/activity, with adequate nutrition and elimination   Outcome: Progressing     Problem: Anxiety  Goal: Anxiety is at manageable level  Description  Interventions:  - Assess and monitor patient's anxiety level  - Monitor for signs and symptoms (heart palpitations, chest pain, shortness of breath, headaches, nausea, feeling jumpy, restlessness, irritable, apprehensive)  - Collaborate with interdisciplinary team and initiate plan and interventions as ordered    - [place] patient to unit/surroundings  - Explain treatment plan  - Encourage participation in care  - Encourage verbalization of concerns/fears  - Identify coping mechanisms  - Assist in developing anxiety-reducing skills  - Administer/offer alternative therapies  - Limit or eliminate stimulants  Outcome: Progressing     Problem: DISCHARGE PLANNING - CARE MANAGEMENT  Goal: Discharge to post-acute care or home with appropriate resources  Description  INTERVENTIONS:  - Conduct assessment to determine patient/family and health care team treatment goals, and need for post-acute services based on payer coverage, community resources, and patient preferences, and barriers to discharge  - Address psychosocial, clinical, and financial barriers to discharge as identified in assessment in conjunction with the patient/family and health care team  - Arrange appropriate level of post-acute services according to patient's needs and preference and payer coverage in collaboration with the physician and health care team  - Communicate with and update the patient/family, physician, and health care team regarding progress on the discharge plan  - Arrange appropriate transportation to post-acute venues   Outcome: Progressing

## 2019-10-24 NOTE — PROGRESS NOTES
Team Members Present:   MD Rich Kennedy, JARED Figueredo, RN  Arianna Christianson, YUDY / Shawn Reich RN  [facility], [facility]    Agitated  Wants group home  Impulsive  Unstable

## 2019-10-24 NOTE — PROGRESS NOTES
Pt visible on unit, pacing hallways at times  Minimal group attendance  Mostly keeps to self  Reports anxiety in afternoon; new orders obtained  Tearful at times  Pt feels like she needs a group home to improve  No yelling or acting out  Denies SI, HI, A/T/V  Compliant with meals and meds  Monitoring continues

## 2019-10-24 NOTE — PROGRESS NOTES
Progress Note - Behavioral Health     Jagruti Conor 51 y o  female MRN: [id number]   Unit/Bed#: [facility] 224-01 Encounter: [id number]    Behavior over the last 24 hours:      Shaunna Aleman was seen for an inpatient follow-up psychiatric visit this date  At today's visit, she remains labile, irritable, disorganized, and unfocused  She denies any suicidal or homicidal ideation  She does not want any of her medications changed and insists she is ready to go home  Her mood remains unstable  She changes plans frequently  She is unable to participate in a meaningful conversation at this time as her speech and thoughts are tangential     ROS: no complaints    Mental Status Evaluation:    Appearance:  disheveled   Behavior:  pleasant, cooperative   Speech:  normal rate and volume   Mood:  normal   Affect:  normal range and intensity   Thought Process:  disorganized, illogical, circumstantial, tangential   Associations: tangential associations   Thought Content:  mild paranoia   Perceptual Disturbances: auditory hallucinations   Risk Potential: Suicidal ideation - None  Homicidal ideation - None  Potential for aggression - No   Sensorium:  oriented to person, place and time/date   Memory:  recent and remote memory grossly intact   Consciousness:  alert and awake   Attention: poor concentration and poor attention span   Insight:  poor   Judgment: poor   Gait/Station: normal gait/station, normal balance   Motor Activity: no abnormal movements     Vital signs in last 24 hours:    Temp:  [97 6 °F (36 4 °C)-98 6 °F (37 °C)] 98 6 °F (37 °C)  HR:  [] 101  Resp:  [16-18] 16  BP: (105-120)/(68-70) 120/70    Laboratory results:  I have personally reviewed all pertinent laboratory/tests results      Progress Toward Goals: no change    Assessment/Plan   Principal Problem:    Schizoaffective disorder, bipolar type (HCC)  Active Problems:    Uncomplicated alcohol dependence (HCC)    Medical clearance for psychiatric admission    Dyslipidemia Ana Maria    Recommended Treatment:     Ativan increased to 1 mg t i d   Will continue risperidone all as prescribed  Will continue to monitor  Discharge disposition and planning are ongoing      All current active medications have been reviewed  Encourage group therapy, milieu therapy and occupational therapy  Behavioral Health checks every 7 minutes      Current Facility-Administered Medications:  acetaminophen 650 mg Oral Q6H PRN Isis Lama, CRNP   acetaminophen 650 mg Oral Q4H PRN Isis Lama, CRNP   acetaminophen 975 mg Oral Q6H PRN Isis Lama, CRNP   aluminum-magnesium hydroxide-simethicone 30 mL Oral Q4H PRN Shade Kimberly, MD   benztropine 1 mg Intramuscular Q6H PRN Shade Ground, MD   benztropine 1 mg Oral Q6H PRN [place], MD   gabapentin 600 mg Oral TID Isis Lama, CRNP   haloperidol 5 mg Oral Q6H PRN Isis Lama, CRNP   haloperidol lactate 5 mg Intramuscular Q6H PRN Isis Lama, CRNP   hydrocortisone  Topical 4x Daily PRN Isis Lama, CRNP   hydrOXYzine HCL 25 mg Oral Q6H PRN Shade Ground, MD   hydrOXYzine HCL 50 mg Oral Q4H PRN Isis Lama, CRNP   LORazepam 2 mg Intramuscular Q4H PRN Isis Lama, CRNP   LORazepam 1 mg Oral TID Gael Back MD   magnesium hydroxide 30 mL Oral Daily PRN Shade Ground, MD   neomycin-bacitracin-polymyxin b 1 large application Topical BID Ana Solitario MD   nicotine 1 patch Transdermal Daily Isis Lama, CRNP   OLANZapine 10 mg Intramuscular Q3H PRN Isis Lama, CRNP   pravastatin 20 mg Oral Daily With eoSemi, MD   risperiDONE 1 mg Oral Q6H PRN Anthony Harris MD   risperiDONE 1 mg Oral BID Isis Lama, CRNP   risperiDONE 2 mg Oral QPM Isis Lama, JARED   traZODone 50 mg Oral HS PRN Anthony Harris MD       Risks / Benefits of Treatment:    Risks, benefits, and possible side effects of medications explained to patient and patient verbalizes understanding and agreement for treatment  Counseling / Coordination of Care:      Patient's progress discussed with staff in treatment team meeting  Medications, treatment progress and treatment plan reviewed with patient

## 2019-10-24 NOTE — PROGRESS NOTES
Pt was irritable and disruptive at the beginning of the shift  Pt pacing in the halls rapidly cursing and yelling  Ativan 2mg IM administered at 1946 and was effective  Pt appears internally preoccupied and states she wants to be normal and that all the people in her room should leave (nobody in her room)  Pt refused IM Haldol and Cogentin IM (medication wasted)  Will continue to monitor

## 2019-10-25 PROCEDURE — 99231 SBSQ HOSP IP/OBS SF/LOW 25: CPT | Performed by: NURSE PRACTITIONER

## 2019-10-25 RX ORDER — PALIPERIDONE 6 MG/1
6 TABLET, EXTENDED RELEASE ORAL DAILY
Status: DISCONTINUED | OUTPATIENT
Start: 2019-10-26 | End: 2019-10-28

## 2019-10-25 RX ORDER — RISPERIDONE 1 MG/1
1 TABLET, ORALLY DISINTEGRATING ORAL DAILY
Status: DISCONTINUED | OUTPATIENT
Start: 2019-10-25 | End: 2019-10-25

## 2019-10-25 RX ORDER — RISPERIDONE 2 MG/1
2 TABLET, ORALLY DISINTEGRATING ORAL DAILY
Status: DISCONTINUED | OUTPATIENT
Start: 2019-10-25 | End: 2019-10-25

## 2019-10-25 RX ORDER — RISPERIDONE 2 MG/1
2 TABLET, FILM COATED ORAL EVERY EVENING
Status: COMPLETED | OUTPATIENT
Start: 2019-10-25 | End: 2019-10-25

## 2019-10-25 RX ORDER — RISPERIDONE 2 MG/1
2 TABLET, ORALLY DISINTEGRATING ORAL DAILY
Status: DISCONTINUED | OUTPATIENT
Start: 2019-10-26 | End: 2019-10-25

## 2019-10-25 RX ORDER — LORAZEPAM 0.5 MG/1
0.5 TABLET ORAL 3 TIMES DAILY
Status: DISCONTINUED | OUTPATIENT
Start: 2019-10-25 | End: 2019-10-29

## 2019-10-25 RX ADMIN — GABAPENTIN 600 MG: 300 CAPSULE ORAL at 16:00

## 2019-10-25 RX ADMIN — RISPERIDONE 1 MG: 1 TABLET, ORALLY DISINTEGRATING ORAL at 08:15

## 2019-10-25 RX ADMIN — LORAZEPAM 0.5 MG: 0.5 TABLET ORAL at 21:38

## 2019-10-25 RX ADMIN — GABAPENTIN 600 MG: 300 CAPSULE ORAL at 08:15

## 2019-10-25 RX ADMIN — RISPERIDONE 1 MG: 1 TABLET, ORALLY DISINTEGRATING ORAL at 12:42

## 2019-10-25 RX ADMIN — LORAZEPAM 0.5 MG: 0.5 TABLET ORAL at 16:00

## 2019-10-25 RX ADMIN — LORAZEPAM 2 MG: 2 INJECTION INTRAMUSCULAR; INTRAVENOUS at 04:06

## 2019-10-25 RX ADMIN — GABAPENTIN 600 MG: 300 CAPSULE ORAL at 21:38

## 2019-10-25 RX ADMIN — BACITRACIN ZINC, NEOMYCIN, POLYMYXIN B 1 LARGE APPLICATION: 400; 3.5; 5 OINTMENT TOPICAL at 17:27

## 2019-10-25 RX ADMIN — HYDROCORTISONE: 10 CREAM TOPICAL at 17:27

## 2019-10-25 RX ADMIN — NICOTINE 1 PATCH: 14 PATCH, EXTENDED RELEASE TRANSDERMAL at 08:21

## 2019-10-25 RX ADMIN — RISPERIDONE 2 MG: 2 TABLET ORAL at 17:26

## 2019-10-25 RX ADMIN — PRAVASTATIN SODIUM 20 MG: 20 TABLET ORAL at 16:00

## 2019-10-25 NOTE — PROGRESS NOTES
Progress Note - Behavioral Health     Queenie Blunt 51 y o  female MRN: [id number]   Unit/Bed#: [facility] 224-01 Encounter: [id number]    Behavior over the last 24 hours:      Lizeth Flaherty was seen for an inpatient, follow-up psychiatric visit this date  At today's visit, she remains tangential, circumstantial, and pressured  She is taking her medications as prescribed  She denies any side effects  Per psychiatric rounds, she is still requiring evening IM injections of Ativan in order to remain calm and sleep through the night  The possibility of beginning Sibley Patrick was discussed  She is agreeable at this time  She wishes to try the p o  Invega before receiving the injection  She requests her Ativan be decreased  ROS: no complaints    Mental Status Evaluation:    Appearance:  disheveled, marginal hygiene   Behavior:  cooperative, demanding, restless and fidgety   Speech:  increased rate, pressured, hypertalkative   Mood:  dysphoric, anxious, labile   Affect:  normal range and intensity   Thought Process:  circumstantial, tangential   Associations: tangential associations   Thought Content:  no overt delusions   Perceptual Disturbances: auditory hallucinations   Risk Potential: Suicidal ideation - None  Homicidal ideation - None  Potential for aggression - No   Sensorium:  oriented to person, place and time/date   Memory:  recent and remote memory grossly intact   Consciousness:  alert and awake   Attention: poor concentration and poor attention span   Insight:  poor   Judgment: poor   Gait/Station: normal gait/station, normal balance   Motor Activity: no abnormal movements     Vital signs in last 24 hours:    Temp:  [97 8 °F (36 6 °C)-98 5 °F (36 9 °C)] 97 8 °F (36 6 °C)  HR:  [] 105  Resp:  [16] 16  BP: (102-126)/(63-75) 102/63    Laboratory results:  I have personally reviewed all pertinent laboratory/tests results      Progress Toward Goals: progressing    Assessment/Plan   Principal Problem:    Schizoaffective disorder, bipolar type ([facility] )  Active Problems:    Uncomplicated alcohol dependence ([facility] )    Medical clearance for psychiatric admission    Dyslipidemia    [place]cle    Recommended Treatment:     Will discontinue p o  Risperdal   Will begin p o  Invega 6 mg daily  Will order injectable Invega to be administered next week        All current active medications have been reviewed  Encourage group therapy, milieu therapy and occupational therapy  Behavioral Health checks every 7 minutes      Current Facility-Administered Medications:  acetaminophen 650 mg Oral Q6H PRN Isis Lama, CRNP   acetaminophen 650 mg Oral Q4H PRN Isis Lama, CRNP   acetaminophen 975 mg Oral Q6H PRN Isis Lama, CRNP   aluminum-magnesium hydroxide-simethicone 30 mL Oral Q4H PRN Kimberly Pump, MD   benztropine 1 mg Intramuscular Q6H PRN Kimberly Pump, MD   benztropine 1 mg Oral Q6H PRN Kimberly Pump, MD   gabapentin 600 mg Oral TID Isis Lama, RENATANP   haloperidol 5 mg Oral Q6H PRN Isis Lama, CRNP   haloperidol lactate 5 mg Intramuscular Q6H PRN Isis Lama, CRNP   hydrocortisone  Topical 4x Daily PRN Isis Laam, JARED   hydrOXYzine HCL 25 mg Oral Q6H PRN Kimbelry Pump, MD   hydrOXYzine HCL 50 mg Oral Q4H PRN Isis Lama, RENATANP   LORazepam 2 mg Intramuscular Q4H PRN Isis Lama, CRNP   LORazepam 1 mg Oral TID Cherry France MD   magnesium hydroxide 30 mL Oral Daily PRN Kimberly Pump, MD   neomycin-bacitracin-polymyxin b 1 large application Topical BID Nuvia Molina MD   nicotine 1 patch Transdermal Daily Isis Lama, CRNP   OLANZapine 10 mg Intramuscular Q3H PRN Isis Lama, CRNP   pravastatin 20 mg Oral Daily With [employer], MD   risperiDONE 1 mg Oral Q6H PRN Kimberly Pump, MD   risperiDONE 1 mg Oral BID Isis Lama, RENATANP   risperiDONE 2 mg Oral QPM Isis Lama, JARED   traZODone 50 mg Oral HS PRN Kimberly Pump, MD       Risks / Benefits of Treatment:    Risks, benefits, and possible side effects of medications explained to patient and patient verbalizes understanding and agreement for treatment  Counseling / Coordination of Care:      Patient's progress discussed with staff in treatment team meeting  Medications, treatment progress and treatment plan reviewed with patient

## 2019-10-25 NOTE — PROGRESS NOTES
Pt pacing the halls intermittently throughout the day  Restless in the morning and calmer throughout the day  Pt comes to staff with numerous questions regarding medication adjustments  Pt rates her anxiety at 7/10 and depression as 4/10  Pt selectively social with staff at times  Pt denies SI, HI, and hallucinations

## 2019-10-25 NOTE — PLAN OF CARE
PT continues to attend most art therapy groups offered where she appears motivated to work on projects of  choice and to engage with the offered supplies  PT mostly withdrawn among peers, however does appear more comfortable in seeking out AT for 1:1 processing       Problem: Ineffective Coping  Goal: Participates in unit activities  Description  Interventions:  - Provide therapeutic environment   - Provide required programming   - Redirect inappropriate behaviors   Outcome: Progressing

## 2019-10-25 NOTE — PROGRESS NOTES
Pt has been mostly withdrawn to her room  Pt states she needs to catch up on her sleep  No yelling, cursing, pacing in the halls  Denies SI/HI/AH/VH  Complaint with meals and meds  Will continue to monitor

## 2019-10-25 NOTE — PROGRESS NOTES
Team Members Present:   MD John Calderon, JARED Velasco, RN   Tom Figueredo, RN / Tonya Abreu RN  [facility], [facility]    Good yesterday, but got up at night, anxiety / started screaming for which she got Ativan  Impulsive

## 2019-10-25 NOTE — PROGRESS NOTES
Pt has been awake since 0051hrs PRN Ativan IM 2mg administered at 0406 due to severe anxiety  All other prns offered for agitation and pt refused every one  Medication appeared effective  Did discuss with pt that she is scheduled ativan 1mg three times a day and PRN IM ativan can cause sedation  Will continue to monitor

## 2019-10-25 NOTE — PLAN OF CARE
Problem: Ineffective Coping  Goal: Participates in unit activities  Description  Interventions:  - Provide therapeutic environment   - Provide required programming   - Redirect inappropriate behaviors   Outcome: Progressing     Problem: Ineffective Coping  Goal: Cooperates with admission process  Description  Interventions:   - Complete admission process  Outcome: Progressing  Goal: Patient/Family participate in treatment and DC plans  Description  Interventions:  - Provide therapeutic environment  Outcome: Progressing  Goal: Patient/Family verbalizes awareness of resources  Outcome: Progressing  Goal: Understands least restrictive measures  Description  Interventions:  - Utilize least restrictive behavior  Outcome: Progressing  Goal: Free from restraint events  Description  - Utilize least restrictive measures   - Provide behavioral interventions   - Redirect inappropriate behaviors   Outcome: Progressing     Problem: Depression  Goal: Treatment Goal: Demonstrate behavioral control of depressive symptoms, verbalize feelings of improved mood/affect, and adopt new coping skills prior to discharge  Outcome: Progressing  Goal: Verbalize thoughts and feelings  Description  Interventions:  - Assess and re-assess patient's level of risk   - Engage patient in 1:1 interactions, daily, for a minimum of 15 minutes   - Encourage patient to express feelings, fears, frustrations, hopes   Outcome: Progressing  Goal: Refrain from harming self  Description  Interventions:  - Monitor patient closely, per order   - Supervise medication ingestion, monitor effects and side effects   Outcome: Progressing  Goal: Refrain from isolation  Description  Interventions:  - Develop a trusting relationship   - Encourage socialization   Outcome: Progressing  Goal: Attend and participate in unit activities, including therapeutic, recreational, and educational groups  Description  Interventions:  - Provide therapeutic and educational activities daily, encourage attendance and participation, and document same in the medical record   Outcome: Progressing     Problem: DISCHARGE PLANNING - CARE MANAGEMENT  Goal: Discharge to post-acute care or home with appropriate resources  Description  INTERVENTIONS:  - Conduct assessment to determine patient/family and health care team treatment goals, and need for post-acute services based on payer coverage, community resources, and patient preferences, and barriers to discharge  - Address psychosocial, clinical, and financial barriers to discharge as identified in assessment in conjunction with the patient/family and health care team  - Arrange appropriate level of post-acute services according to patient's   needs and preference and payer coverage in collaboration with the physician and health care team  - Communicate with and update the patient/family, physician, and health care team regarding progress on the discharge plan  - Arrange appropriate transportation to post-acute venues   Outcome: Progressing

## 2019-10-26 PROCEDURE — 99231 SBSQ HOSP IP/OBS SF/LOW 25: CPT | Performed by: NURSE PRACTITIONER

## 2019-10-26 RX ORDER — LORAZEPAM 0.5 MG/1
0.5 TABLET ORAL EVERY 4 HOURS PRN
Status: DISCONTINUED | OUTPATIENT
Start: 2019-10-26 | End: 2019-11-07 | Stop reason: HOSPADM

## 2019-10-26 RX ORDER — THIAMINE MONONITRATE (VIT B1) 100 MG
100 TABLET ORAL DAILY
Status: DISCONTINUED | OUTPATIENT
Start: 2019-10-26 | End: 2019-11-07 | Stop reason: HOSPADM

## 2019-10-26 RX ADMIN — PALIPERIDONE 6 MG: 6 TABLET, EXTENDED RELEASE ORAL at 08:47

## 2019-10-26 RX ADMIN — NICOTINE 1 PATCH: 14 PATCH, EXTENDED RELEASE TRANSDERMAL at 08:48

## 2019-10-26 RX ADMIN — LORAZEPAM 0.5 MG: 0.5 TABLET ORAL at 13:02

## 2019-10-26 RX ADMIN — GABAPENTIN 600 MG: 300 CAPSULE ORAL at 20:02

## 2019-10-26 RX ADMIN — LORAZEPAM 0.5 MG: 0.5 TABLET ORAL at 20:02

## 2019-10-26 RX ADMIN — Medication 100 MG: at 17:04

## 2019-10-26 RX ADMIN — RISPERIDONE 1 MG: 1 TABLET, ORALLY DISINTEGRATING ORAL at 07:45

## 2019-10-26 RX ADMIN — GABAPENTIN 600 MG: 300 CAPSULE ORAL at 08:47

## 2019-10-26 RX ADMIN — LORAZEPAM 0.5 MG: 0.5 TABLET ORAL at 08:47

## 2019-10-26 RX ADMIN — GABAPENTIN 600 MG: 300 CAPSULE ORAL at 17:03

## 2019-10-26 RX ADMIN — RISPERIDONE 1 MG: 1 TABLET, ORALLY DISINTEGRATING ORAL at 20:02

## 2019-10-26 RX ADMIN — PRAVASTATIN SODIUM 20 MG: 20 TABLET ORAL at 17:04

## 2019-10-26 RX ADMIN — LORAZEPAM 0.5 MG: 0.5 TABLET ORAL at 17:00

## 2019-10-26 NOTE — PROGRESS NOTES
At start of shift patient observed pacing halls quickly  Agitated  Yelling loudly  Actively responding to internal stimulon  Positive auditory hallucinations  "People harassing me through the vents  Just like everytime" Pt agreeable to take risperidone SL prn  Will evaluate effectiveness

## 2019-10-26 NOTE — PROGRESS NOTES
Patient remains awake  Pleasant  Paces the acharya  No complaints of anxiety  Makes needs known  Will continue to monitor

## 2019-10-26 NOTE — PLAN OF CARE
Problem: Ineffective Coping  Goal: Participates in unit activities  Description  Interventions:  - Provide therapeutic environment   - Provide required programming   - Redirect inappropriate behaviors   Outcome: Progressing     Problem: Ineffective Coping  Goal: Cooperates with admission process  Description  Interventions:   - Complete admission process  Outcome: Progressing  Goal: Identifies ineffective coping skills  Outcome: Progressing  Goal: Identifies healthy coping skills  Outcome: Progressing  Goal: Demonstrates healthy coping skills  Outcome: Progressing  Goal: Patient/Family participate in treatment and DC plans  Description  Interventions:  - Provide therapeutic environment  Outcome: Progressing  Goal: Patient/Family verbalizes awareness of resources  Outcome: Progressing  Goal: Understands least restrictive measures  Description  Interventions:  - Utilize least restrictive behavior  Outcome: Progressing  Goal: Free from restraint events  Description  - Utilize least restrictive measures   - Provide behavioral interventions   - Redirect inappropriate behaviors   Outcome: Progressing     Problem: Depression  Goal: Treatment Goal: Demonstrate behavioral control of depressive symptoms, verbalize feelings of improved mood/affect, and adopt new coping skills prior to discharge  Outcome: Progressing  Goal: Verbalize thoughts and feelings  Description  Interventions:  - Assess and re-assess patient's level of risk   - Engage patient in 1:1 interactions, daily, for a minimum of 15 minutes   - Encourage patient to express feelings, fears, frustrations, hopes   Outcome: Progressing  Goal: Refrain from harming self  Description  Interventions:  - Monitor patient closely, per order   - Supervise medication ingestion, monitor effects and side effects   Outcome: Progressing  Goal: Refrain from isolation  Description  Interventions:  - Develop a trusting relationship   - Encourage socialization   Outcome: Progressing  Goal: Refrain from self-neglect  Outcome: Progressing  Goal: Attend and participate in unit activities, including therapeutic, recreational, and educational groups  Description  Interventions:  - Provide therapeutic and educational activities daily, encourage attendance and participation, and document same in the medical record   Outcome: Progressing  Goal: Complete daily ADLs, including personal hygiene independently, as able  Description  Interventions:  - Observe, teach, and assist patient with ADLS  -  Monitor and promote a balance of rest/activity, with adequate nutrition and elimination   Outcome: Progressing     Problem: Anxiety  Goal: Anxiety is at manageable level  Description  Interventions:  - Assess and monitor patient's anxiety level  - Monitor for signs and symptoms (heart palpitations, chest pain, shortness of breath, headaches, nausea, feeling jumpy, restlessness, irritable, apprehensive)  - Collaborate with interdisciplinary team and initiate plan and interventions as ordered    - [place] patient to unit/surroundings  - Explain treatment plan  - Encourage participation in care  - Encourage verbalization of concerns/fears  - Identify coping mechanisms  - Assist in developing anxiety-reducing skills  - Administer/offer alternative therapies  - Limit or eliminate stimulants  Outcome: Progressing

## 2019-10-26 NOTE — PROGRESS NOTES
Progress Note - Michael Mercado 51 y o  female MRN: [id number]  Unit/Bed#: [facility] 224-01 Encounter: [id number]    The patient was seen for continuing care and reviewed with treatment team   Patient presents with mood congruent affect, stating she feels a bit anxious but states she feels glad about starting Invega PO  She is eager to start the injection so that she can be discharged, as she has been looking for an apartment to move into with hopes of going to a group home in the future  She states this morning she was yelling because she was distraught, attributing this to the decrease in Ativan, stating she does want it lower (currently 0 5 mg TID) but feels it may have been decreased too much for her  She is requesting a PRN Ativan dose during the day 'in case I need it', which we can do while she is in the hospital and starting new medications  She states Atarax has made her feel 'doped up' in the past and does not want that  She states she is fear of living alone, though feels safe on the unit  She denies hallucinations as well as overt paranoia today; she denies SI/HI as well  Patient denies side effects of medications      Mental Status Evaluation:  Appearance:  Good eye contact and disheveled   Behavior:  calm and cooperative   Fund of knowledge  vocabulary Average   Speech:   Language: Normal rate and Normal volume  No overt abnormality   Mood:  anxious   Affect:   Associations: mood-congruent  Tightly connected   Thought Process:  Goal directed and coherent   Thought Content:  Does not verbalize delusional material   Perceptual Disturbances: Denies hallucinations and does not appear to be responding to internal stimuli   Risk Potential: No suicidal or homicidal ideation   Orientation  Oriented x 3   Memory No deficits   Attention/Concentration attention span and concentration were age appropriate   Insight:  Good insight   Judgment: Good judgment   Gait/Station: normal gait/station Motor Activity: No abnormal movement noted     Vitals:    10/26/19 0704   BP: 124/79   Pulse: 100   Resp: 16   Temp: 97 6 °F (36 4 °C)   SpO2: 95%     Progress Toward Goals: medication adherent and accepting of new medications    Assessment/Plan    Principal Problem:    Schizoaffective disorder, bipolar type ([facility] [address] )  Active Problems:    Uncomplicated alcohol dependence (HCC)    Medical clearance for psychiatric admission    Dyslipidemia    [place]    Plan:   Continue q 7 minute safety checks  Encourage groups and milieu therapy  Continue current medications, will add PRN Ativan 0 5 mg for the time being  Discharge planning ongoing    Recommended Treatment: Continue with pharmacotherapy, group therapy, milieu therapy and occupational therapy    The patient will be maintained on the following medications:    Current Facility-Administered Medications:  acetaminophen 650 mg Oral Q6H PRN Isis Lama, CRNP   acetaminophen 650 mg Oral Q4H PRN Isis Lmaa, CRNP   acetaminophen 975 mg Oral Q6H PRN Isis Lama, CRNP   aluminum-magnesium hydroxide-simethicone 30 mL Oral Q4H PRN Rafael Cadena MD   benztropine 1 mg Intramuscular Q6H PRN Rafael Cadena MD   benztropine 1 mg Oral Q6H PRN Rafael Cadena MD   gabapentin 600 mg Oral TID Isis Lama, CRNP   haloperidol 5 mg Oral Q6H PRN Isis Lama, CRNP   haloperidol lactate 5 mg Intramuscular Q6H PRN Isis Lama, CRNP   hydrocortisone  Topical 4x Daily PRN Isis Lama, CRNP   hydrOXYzine HCL 50 mg Oral Q4H PRN Isis Lama, CRNP   LORazepam 2 mg Intramuscular Q4H PRN Isis Lama, CRNP   LORazepam 0 5 mg Oral TID Isis Lama, CRNP   LORazepam 0 5 mg Oral Q4H PRN Rayne Brady, CRNP   magnesium hydroxide 30 mL Oral Daily PRN Rafael Cadena MD   neomycin-bacitracin-polymyxin b 1 large application Topical BID Ashlie Dan MD   nicotine 1 patch Transdermal Daily Isis Lama, RENATANP   OLANZapine 10 mg Intramuscular Q3H PRN JARED Jones   paliperidone 6 mg Oral Daily JARED Jones   pravastatin 20 mg Oral Daily With Kael Thorpe MD   risperiDONE 1 mg Oral Q6H PRN Kimberly Pump, MD   traZODone 50 mg Oral HS PRN Kimberly Pump, MD       Risks, benefits and possible side effects of Medications:   Risks, benefits, and possible side effects of medications explained to patient and patient verbalizes understanding        JARED Sanchez  10/26/2019

## 2019-10-26 NOTE — PROGRESS NOTES
Pt has extreme outbursts of yelling and screaming and slamming bedroom door  Periods of paranoia  Active hallucinations  Pt responds well to decreased stimulation  Able to gain control of behaviors  Requesting ativan  Provider aware and ativan 0 5 mg po prn prescribed  Administered as prescribed

## 2019-10-26 NOTE — PROGRESS NOTES
Patient yelling loudly from her room, "nurse, come in here " Upon entering room patient is laying in bed quietly with eyes closed  Patient requesting Ativan IM  Patient is irritable and unwilling to answer any assessment questions repeating, "my doctor knows all of this already, I don't need to tell you " When explaining to patient that scheduled Ativan was just administered at 2138 patient says, "yeah that's because that's what I take on the outside  The ativan shot is ordered as need it  Explaining to patient it had been less than an hour since administration of oral Ativan  Patient says, "then I will just wait until I can have the shot or probably fall back asleep " Patient appears in no immediate distress  Labile and argumentative  Will continue to monitor

## 2019-10-26 NOTE — PROGRESS NOTES
Patient has been withdrawn to room  Denies current feelings of anxiety/depression  Reports A/V hallucinations to be "minimal" at this time  Denies SI/HI  Patient does c/o waking up "sweaty " Patient temperature was 98 0 F  Patient says she has nightmares, but does not remember them  Patient pillowcase was noted to be damp  Patient compliant with medications  Pleasant during interaction  Denies any needs at this time  Will continue to monitor

## 2019-10-27 PROCEDURE — NC001 PR NO CHARGE: Performed by: PSYCHIATRY & NEUROLOGY

## 2019-10-27 PROCEDURE — 99232 SBSQ HOSP IP/OBS MODERATE 35: CPT | Performed by: PSYCHIATRY & NEUROLOGY

## 2019-10-27 RX ORDER — LIDOCAINE 50 MG/G
1 PATCH TOPICAL DAILY
Status: DISCONTINUED | OUTPATIENT
Start: 2019-10-28 | End: 2019-11-07 | Stop reason: HOSPADM

## 2019-10-27 RX ADMIN — RISPERIDONE 1 MG: 1 TABLET, ORALLY DISINTEGRATING ORAL at 04:55

## 2019-10-27 RX ADMIN — GABAPENTIN 600 MG: 300 CAPSULE ORAL at 16:03

## 2019-10-27 RX ADMIN — GABAPENTIN 600 MG: 300 CAPSULE ORAL at 19:45

## 2019-10-27 RX ADMIN — Medication 100 MG: at 08:04

## 2019-10-27 RX ADMIN — GABAPENTIN 600 MG: 300 CAPSULE ORAL at 08:04

## 2019-10-27 RX ADMIN — LORAZEPAM 0.5 MG: 0.5 TABLET ORAL at 03:27

## 2019-10-27 RX ADMIN — ZINC 1 TABLET: TAB ORAL at 08:04

## 2019-10-27 RX ADMIN — PALIPERIDONE 6 MG: 6 TABLET, EXTENDED RELEASE ORAL at 08:04

## 2019-10-27 RX ADMIN — NICOTINE 1 PATCH: 14 PATCH, EXTENDED RELEASE TRANSDERMAL at 08:05

## 2019-10-27 RX ADMIN — ACETAMINOPHEN 650 MG: 325 TABLET ORAL at 03:27

## 2019-10-27 RX ADMIN — PRAVASTATIN SODIUM 20 MG: 20 TABLET ORAL at 16:30

## 2019-10-27 RX ADMIN — LORAZEPAM 0.5 MG: 0.5 TABLET ORAL at 08:04

## 2019-10-27 RX ADMIN — RISPERIDONE 1 MG: 1 TABLET, ORALLY DISINTEGRATING ORAL at 19:44

## 2019-10-27 RX ADMIN — LORAZEPAM 0.5 MG: 0.5 TABLET ORAL at 19:45

## 2019-10-27 RX ADMIN — LORAZEPAM 0.5 MG: 0.5 TABLET ORAL at 16:03

## 2019-10-27 NOTE — PROGRESS NOTES
Patient visible on unit for a short period  Requesting PRN Risperdal for agitation and HS medications  Patient says she feels the current dose of Invega only "lasts for about two hours " Patient still actively responding to internal stimuli  Can be overheard arguing with her hallucinations in her bedroom  Patient is labile  Denies SI/HI  Will continue to monitor

## 2019-10-27 NOTE — PROGRESS NOTES
C/O" I am having some up and down, have been risperidone and it stopped working for me "    Report from staff regarding this patient received and record reviewed  prior to seeing this patient   Behavior over the last 24 hours:  Re[ports waiting for insurance to approve in martinez injection for her  Tolerating meds, states not have any bad hallucination but still feels moodiness  stated that she has been waiting for group for past one year  Sleep ; good  Appetite:ok  Medication side effects:denied  ROS:improving   Mental Status Evaluation:  Appearance:  Dressed appropraitely   Behavior:  cooperative   Speech:  normal   Mood:  blunted   Affect:    blunted   Thought Process:  disorganized   Thought Content:  paranoid   Perceptual Disturbances: Denied AV hallucination   Risk Potential: NO ROLA    Sensorium:  normal   Cognition:  intact   Consciousness:  Alert, OX3   Attention: Fair   Insight:  limited   Judgment: limited   Gait/Station: With in normal range   Motor Activity: With in normal range     Progress Toward Goals: working on current treatment goals, no changes  Made in treatment plan   Recommended Treatment: Continue with group therapy, milieu therapy and occupational therapy  Risks, benefits and possible side effects of Medications:   Risks, benefits, and possible side effects of medications explained to patient and patient verbalizes understanding        Medications:   current meds:   Current Facility-Administered Medications   Medication Dose Route Frequency    acetaminophen (TYLENOL) tablet 650 mg  650 mg Oral Q6H PRN    acetaminophen (TYLENOL) tablet 650 mg  650 mg Oral Q4H PRN    acetaminophen (TYLENOL) tablet 975 mg  975 mg Oral Q6H PRN    aluminum-magnesium hydroxide-simethicone (MYLANTA) 200-200-20 mg/5 mL oral suspension 30 mL  30 mL Oral Q4H PRN    benztropine (COGENTIN) injection 1 mg  1 mg Intramuscular Q6H PRN    benztropine (COGENTIN) tablet 1 mg  1 mg Oral Q6H PRN    gabapentin (NEURONTIN) capsule 600 mg  600 mg Oral TID    haloperidol (HALDOL) tablet 5 mg  5 mg Oral Q6H PRN    haloperidol lactate (HALDOL) injection 5 mg  5 mg Intramuscular Q6H PRN    hydrocortisone 1 % cream   Topical 4x Daily PRN    hydrOXYzine HCL (ATARAX) tablet 50 mg  50 mg Oral Q4H PRN    LORazepam (ATIVAN) 2 mg/mL injection 2 mg  2 mg Intramuscular Q4H PRN    LORazepam (ATIVAN) tablet 0 5 mg  0 5 mg Oral TID    LORazepam (ATIVAN) tablet 0 5 mg  0 5 mg Oral Q4H PRN    magnesium hydroxide (MILK OF MAGNESIA) 400 mg/5 mL oral suspension 30 mL  30 mL Oral Daily PRN    multivitamin stress formula tablet 1 tablet  1 tablet Oral Daily    neomycin-bacitracin-polymyxin b (NEOSPORIN) ointment 1 large application  1 large application Topical BID    nicotine (NICODERM CQ) 14 mg/24hr TD 24 hr patch 1 patch  1 patch Transdermal Daily    OLANZapine (ZyPREXA) IM injection 10 mg  10 mg Intramuscular Q3H PRN    paliperidone (INVEGA) 24 hr tablet 6 mg  6 mg Oral Daily    pravastatin (PRAVACHOL) tablet 20 mg  20 mg Oral Daily With Dinner    risperiDONE (RisperDAL M-TABS) dispersible tablet 1 mg  1 mg Oral Q6H PRN    thiamine (VITAMIN B1) tablet 100 mg  100 mg Oral Daily    traZODone (DESYREL) tablet 50 mg  50 mg Oral HS PRN     Labs: NA    Assessment, Diagnosis  and Plan: continue with current meds and goals, F/U tomorrow    Counseling / Coordination of Care  Total floor / unit time spent today20 minutes  minutes  Greater than 50% of total time was spent with the patient and / or family counseling and / or coordination of care  A description of the counseling / coordination of care:      Rosi Bourgeois MD

## 2019-10-27 NOTE — PROGRESS NOTES
Mood less labile  Less frequent outbursts of yelling and responding to internal/external stimulus  Behaviors controlled throughout day  Compliant with medications as prescribed  Increased somatic preoccupation throughout shift  Addressed patient questions and concerns  Responded well to reassurance by staff  Discussed medical concerns with hospitalist  New orders received for Lidoderm patch once daily  Patient aware to start tomorrow AM  Pt has posititive outlook on treatment and is looking forward to getting monthly inverga sustenna  Pt states, "I don't know why I didn't do this sooner" Safety precautions maintained  Will continue to monitor and assess

## 2019-10-27 NOTE — PLAN OF CARE
Problem: Ineffective Coping  Goal: Participates in unit activities  Description  Interventions:  - Provide therapeutic environment   - Provide required programming   - Redirect inappropriate behaviors   Outcome: Progressing     Problem: Ineffective Coping  Goal: Cooperates with admission process  Description  Interventions:   - Complete admission process  Outcome: Progressing  Goal: Identifies ineffective coping skills  Outcome: Progressing  Goal: Identifies healthy coping skills  Outcome: Progressing  Goal: Demonstrates healthy coping skills  Outcome: Progressing  Goal: Patient/Family participate in treatment and DC plans  Description  Interventions:  - Provide therapeutic environment  Outcome: Progressing  Goal: Patient/Family verbalizes awareness of resources  Outcome: Progressing  Goal: Understands least restrictive measures  Description  Interventions:  - Utilize least restrictive behavior  Outcome: Progressing  Goal: Free from restraint events  Description  - Utilize least restrictive measures   - Provide behavioral interventions   - Redirect inappropriate behaviors   Outcome: Progressing     Problem: Depression  Goal: Treatment Goal: Demonstrate behavioral control of depressive symptoms, verbalize feelings of improved mood/affect, and adopt new coping skills prior to discharge  Outcome: Progressing  Goal: Verbalize thoughts and feelings  Description  Interventions:  - Assess and re-assess patient's level of risk   - Engage patient in 1:1 interactions, daily, for a minimum of 15 minutes   - Encourage patient to express feelings, fears, frustrations, hopes   Outcome: Progressing  Goal: Refrain from harming self  Description  Interventions:  - Monitor patient closely, per order   - Supervise medication ingestion, monitor effects and side effects   Outcome: Progressing  Goal: Refrain from isolation  Description  Interventions:  - Develop a trusting relationship   - Encourage socialization   Outcome: Progressing  Goal: Refrain from self-neglect  Outcome: Progressing  Goal: Attend and participate in unit activities, including therapeutic, recreational, and educational groups  Description  Interventions:  - Provide therapeutic and educational activities daily, encourage attendance and participation, and document same in the medical record   Outcome: Progressing  Goal: Complete daily ADLs, including personal hygiene independently, as able  Description  Interventions:  - Observe, teach, and assist patient with ADLS  -  Monitor and promote a balance of rest/activity, with adequate nutrition and elimination   Outcome: Progressing     Problem: Anxiety  Goal: Anxiety is at manageable level  Description  Interventions:  - Assess and monitor patient's anxiety level  - Monitor for signs and symptoms (heart palpitations, chest pain, shortness of breath, headaches, nausea, feeling jumpy, restlessness, irritable, apprehensive)  - Collaborate with interdisciplinary team and initiate plan and interventions as ordered    - [place] patient to unit/surroundings  - Explain treatment plan  - Encourage participation in care  - Encourage verbalization of concerns/fears  - Identify coping mechanisms  - Assist in developing anxiety-reducing skills  - Administer/offer alternative therapies  - Limit or eliminate stimulants  Outcome: Progressing

## 2019-10-27 NOTE — PROGRESS NOTES
Patient slept poorly through the night  Periods of yelling out and actively responding to internal stimuli  Patient required several PRN medications during the night which only appear effective for a small window of time  Patient c/o nightmares  Will continue to monitor

## 2019-10-28 PROCEDURE — 99231 SBSQ HOSP IP/OBS SF/LOW 25: CPT | Performed by: NURSE PRACTITIONER

## 2019-10-28 RX ORDER — PALIPERIDONE 3 MG/1
3 TABLET, EXTENDED RELEASE ORAL DAILY
Status: DISCONTINUED | OUTPATIENT
Start: 2019-10-29 | End: 2019-11-01

## 2019-10-28 RX ADMIN — BACITRACIN ZINC, NEOMYCIN, POLYMYXIN B 1 LARGE APPLICATION: 400; 3.5; 5 OINTMENT TOPICAL at 17:35

## 2019-10-28 RX ADMIN — PALIPERIDONE PALMITATE 234 MG: 234 INJECTION INTRAMUSCULAR at 12:59

## 2019-10-28 RX ADMIN — LORAZEPAM 0.5 MG: 0.5 TABLET ORAL at 07:47

## 2019-10-28 RX ADMIN — GABAPENTIN 600 MG: 300 CAPSULE ORAL at 20:11

## 2019-10-28 RX ADMIN — NICOTINE 1 PATCH: 14 PATCH, EXTENDED RELEASE TRANSDERMAL at 07:48

## 2019-10-28 RX ADMIN — Medication 100 MG: at 07:47

## 2019-10-28 RX ADMIN — LORAZEPAM 0.5 MG: 0.5 TABLET ORAL at 00:13

## 2019-10-28 RX ADMIN — RISPERIDONE 1 MG: 1 TABLET, ORALLY DISINTEGRATING ORAL at 04:46

## 2019-10-28 RX ADMIN — LORAZEPAM 0.5 MG: 0.5 TABLET ORAL at 19:27

## 2019-10-28 RX ADMIN — LORAZEPAM 0.5 MG: 0.5 TABLET ORAL at 16:03

## 2019-10-28 RX ADMIN — ACETAMINOPHEN 650 MG: 325 TABLET ORAL at 00:13

## 2019-10-28 RX ADMIN — LORAZEPAM 0.5 MG: 0.5 TABLET ORAL at 11:50

## 2019-10-28 RX ADMIN — LIDOCAINE 1 PATCH: 50 PATCH TOPICAL at 07:56

## 2019-10-28 RX ADMIN — ZINC 1 TABLET: TAB ORAL at 07:47

## 2019-10-28 RX ADMIN — PRAVASTATIN SODIUM 20 MG: 20 TABLET ORAL at 16:03

## 2019-10-28 RX ADMIN — LORAZEPAM 0.5 MG: 0.5 TABLET ORAL at 21:52

## 2019-10-28 RX ADMIN — RISPERIDONE 1 MG: 1 TABLET, ORALLY DISINTEGRATING ORAL at 19:57

## 2019-10-28 RX ADMIN — HYDROCORTISONE: 10 CREAM TOPICAL at 17:35

## 2019-10-28 RX ADMIN — HYDROCORTISONE: 10 CREAM TOPICAL at 07:54

## 2019-10-28 RX ADMIN — GABAPENTIN 600 MG: 300 CAPSULE ORAL at 16:03

## 2019-10-28 RX ADMIN — BACITRACIN ZINC, NEOMYCIN, POLYMYXIN B 1 LARGE APPLICATION: 400; 3.5; 5 OINTMENT TOPICAL at 07:51

## 2019-10-28 RX ADMIN — GABAPENTIN 600 MG: 300 CAPSULE ORAL at 07:47

## 2019-10-28 RX ADMIN — PALIPERIDONE 6 MG: 6 TABLET, EXTENDED RELEASE ORAL at 07:47

## 2019-10-28 NOTE — PROGRESS NOTES
Invega injection given as ordered  Patient stated she feels less anxious post PRN Ativan, no further tearfulness  Remains on 7" checks for safety and behaviors

## 2019-10-28 NOTE — PROGRESS NOTES
Patient came to desk  Making needs known appropriately  Verbalized anxiety and reporting a 6/10 headache  Requested PRN Ativan and Tylenol which were administered at 0013  Patient is also requesting that the dosage of her nicotine patch be lowered, relating current strength a contributing factor to her headaches and nightmares  Current patch removed  Will continue to monitor

## 2019-10-28 NOTE — PROGRESS NOTES
Patient visible in milieu and pacing unit  Mood and affect labile as she would be agitated, irritable, yelling, tearful, cursing, slamming door to room  Patient initially endorsed anxiety, denied depression, denied SI/HI however admitted to auditory/visual hallucinations  Patient would not further elaborate on hallucinations but did appear to be responding to internal stimuli  Remains medication compliant and on 7" checks for safety and behaviors

## 2019-10-28 NOTE — PROGRESS NOTES
Progress Note - Behavioral Health     Toni Hare 51 y o  female MRN: [id number]   Unit/Bed#: [facility] 224-01 Encounter: [id number]    Behavior over the last 24 hours:      Lianna Callahan was seen for an inpatient follow-up psychiatric visit this date  At today's visit, she was somewhat anxious, hyperverbal, and tearful at times  She denies any suicidal or homicidal ideation  She relates she is feeling overwhelmed because of her circumstances and is disturbed by the voices in her head which she seems to always experience at night  She did receive her 1st injection of Invega today and will receive her 2nd injection next week  Her appetite is within normal limits  She is taking her medications as prescribed and denies any side effects  Other than occasional verbal outbursts, she has had no behavioral issues on the unit  Her mood remains labile  ROS: no complaints    Mental Status Evaluation:    Appearance:  casually dressed, dressed appropriately   Behavior:  restless and fidgety   Speech:  hypertalkative   Mood:  dysphoric, anxious, labile   Affect:  reactive   Thought Process:  linear   Associations: tangential associations   Thought Content:  some paranoia   Perceptual Disturbances: auditory hallucinations   Risk Potential: Suicidal ideation - None  Homicidal ideation - None  Potential for aggression - No   Sensorium:  oriented to person, place and time/date   Memory:  recent and remote memory grossly intact   Consciousness:  alert and awake   Attention: poor concentration and poor attention span   Insight:  fair   Judgment: fair   Gait/Station: normal gait/station, normal balance   Motor Activity: no abnormal movements     Vital signs in last 24 hours:    Temp:  [97 6 °F (36 4 °C)-97 9 °F (36 6 °C)] 97 6 °F (36 4 °C)  HR:  [75-78] 75  Resp:  [18] 18  BP: (107-116)/(52-77) 107/52    Laboratory results:  I have personally reviewed all pertinent laboratory/tests results      Progress Toward Goals: progressing    Assessment/Plan   Principal Problem:    [facility]  Active Problems:    Uncomplicated alcohol dependence ([facility] )    Medical clearance for psychiatric admission    Dyslipidemia    Ana Maria    Recommended Treatment:     Continue current medications as prescribed  Continue to monitor  Discharge disposition and planning are ongoing      All current active medications have been reviewed  Encourage group therapy, milieu therapy and occupational therapy  Behavioral Health checks every 7 minutes      Current Facility-Administered Medications:  acetaminophen 650 mg Oral Q6H PRN Isis Lama, CRNP   acetaminophen 650 mg Oral Q4H PRN Isis Lama, RENATANP   acetaminophen 975 mg Oral Q6H PRN Isis Lama, JARED   aluminum-magnesium hydroxide-simethicone 30 mL Oral Q4H PRN Rhett Kaufman MD   benztropine 1 mg Intramuscular Q6H PRN Rhett Kaufman MD   benztropine 1 mg Oral Q6H PRN Rhett Kaufman MD   gabapentin 600 mg Oral TID Isis Lama, JARED   haloperidol 5 mg Oral Q6H PRN Isis Lama, JARED   haloperidol lactate 5 mg Intramuscular Q6H PRN Isis Lama, JARED   hydrocortisone  Topical 4x Daily PRN Isis Lama, JARED   hydrOXYzine HCL 50 mg Oral Q4H PRN Issi Lama, JARED   lidocaine 1 patch Topical Daily Pushpa Kline PA-C   LORazepam 2 mg Intramuscular Q4H PRN Isis Lama, JARED   LORazepam 0 5 mg Oral TID Isis Lama, JARED   LORazepam 0 5 mg Oral Q4H PRN JARED Ivy   magnesium hydroxide 30 mL Oral Daily PRN Rhett Kaufman MD   multivitamin stress formula 1 tablet Oral Daily Nancy Loredo PA-C   neomycin-bacitracin-polymyxin b 1 large application Topical BID Rohit Hurtado MD   nicotine 1 patch Transdermal Daily JARED Jones   OLANZapine 10 mg Intramuscular Q3H PRN JARED Ordonez   [START ON 10/29/2019] paliperidone 3 mg Oral Daily JARED Jones   [START ON 11/4/2019] paliperidone palmitate  mg Intramuscular Once JARED Limon   pravastatin 20 mg Oral Daily With CON Díaz MD   risperiDONE 1 mg Oral Q6H PRN Alondra Perez MD   thiamine 100 mg Oral Daily Rafaela Mabry PA-C   traZODone 50 mg Oral HS PRN Alondra Perez MD       Risks / Benefits of Treatment:    Risks, benefits, and possible side effects of medications explained to patient and patient verbalizes understanding and agreement for treatment  Counseling / Coordination of Care:      Patient's progress discussed with staff in treatment team meeting  Medications, treatment progress and treatment plan reviewed with patient

## 2019-10-28 NOTE — PROGRESS NOTES
Patient awake multiple times throughout the night requiring several PRN medications which are effective for short periods of time  Patient was able to remain controlled enough to not yell and scream  Appropriately making needs known  Will continue to monitor

## 2019-10-28 NOTE — PLAN OF CARE
Problem: Ineffective Coping  Goal: Cooperates with admission process  Description  Interventions:   - Complete admission process  Outcome: Progressing  Goal: Identifies ineffective coping skills  Outcome: Progressing  Goal: Identifies healthy coping skills  Outcome: Progressing  Goal: Demonstrates healthy coping skills  Outcome: Progressing  Goal: Patient/Family participate in treatment and DC plans  Description  Interventions:  - Provide therapeutic environment  Outcome: Progressing  Goal: Patient/Family verbalizes awareness of resources  Outcome: Progressing  Goal: Understands least restrictive measures  Description  Interventions:  - Utilize least restrictive behavior  Outcome: Progressing  Goal: Free from restraint events  Description  - Utilize least restrictive measures   - Provide behavioral interventions   - Redirect inappropriate behaviors   Outcome: Progressing     Problem: Ineffective Coping  Goal: Participates in unit activities  Description  Interventions:  - Provide therapeutic environment   - Provide required programming   - Redirect inappropriate behaviors   Outcome: Progressing     Problem: Depression  Goal: Treatment Goal: Demonstrate behavioral control of depressive symptoms, verbalize feelings of improved mood/affect, and adopt new coping skills prior to discharge  Outcome: Progressing  Goal: Verbalize thoughts and feelings  Description  Interventions:  - Assess and re-assess patient's level of risk   - Engage patient in 1:1 interactions, daily, for a minimum of 15 minutes   - Encourage patient to express feelings, fears, frustrations, hopes   Outcome: Progressing  Goal: Refrain from harming self  Description  Interventions:  - Monitor patient closely, per order   - Supervise medication ingestion, monitor effects and side effects   Outcome: Progressing  Goal: Refrain from isolation  Description  Interventions:  - Develop a trusting relationship   - Encourage socialization   Outcome: Progressing  Goal: Refrain from self-neglect  Outcome: Progressing  Goal: Attend and participate in unit activities, including therapeutic, recreational, and educational groups  Description  Interventions:  - Provide therapeutic and educational activities daily, encourage attendance and participation, and document same in the medical record   Outcome: Progressing  Goal: Complete daily ADLs, including personal hygiene independently, as able  Description  Interventions:  - Observe, teach, and assist patient with ADLS  -  Monitor and promote a balance of rest/activity, with adequate nutrition and elimination   Outcome: Progressing     Problem: Anxiety  Goal: Anxiety is at manageable level  Description  Interventions:  - Assess and monitor patient's anxiety level  - Monitor for signs and symptoms (heart palpitations, chest pain, shortness of breath, headaches, nausea, feeling jumpy, restlessness, irritable, apprehensive)  - Collaborate with interdisciplinary team and initiate plan and interventions as ordered    - [place] patient to unit/surroundings  - Explain treatment plan  - Encourage participation in care  - Encourage verbalization of concerns/fears  - Identify coping mechanisms  - Assist in developing anxiety-reducing skills  - Administer/offer alternative therapies  - Limit or eliminate stimulants  Outcome: Progressing

## 2019-10-28 NOTE — PROGRESS NOTES
Patient became labile during change of shift  Actively responding to internal stimulation and becoming agitated  Patient was given PRN Risperdal and scheduled HS medications  Patient compliant with medications  Retreated to room for decreased stimulation  Will continue to monitor

## 2019-10-28 NOTE — PROGRESS NOTES
10/28/19 0923   Team Meeting   Meeting Type Daily Rounds   Patient/Family Present   Patient Present No   Patient's Family Present No     Daily Psychiatric Rounds    Team Members Present:    MD Manda Wood CRNP Cristela Dotter, MSW  [place], [place]  Bridgette Odonnell, YUDY Cope, YUDY    Discussion:     Labile  Yelling out and cursing at times  Agitated  Is delusional and paranoid at times  Will re-eval regimen for mood stabilizer given behavior  Risperdal and Ativan PRNs also used daily   ACT coming for visit today

## 2019-10-28 NOTE — PROGRESS NOTES
Patient tearful, withdrawn to room  Patient stated she feels overwhelmed and upset by feelings of "rejection" by friends outside of hospital due to her drinking and mental illness  Patient also expressed frustration with the auditory hallucinations which are newer to her, occurring for "about a year"  Patient admitted to stopping her psychiatric medication at times  Patient aware of new order for Invega Injection and feels hopeful this will help end her auditory hallucinations  PRN Ativan given as ordered for anxiety  Support provided, remains on 7" checks for safety and behaviors

## 2019-10-29 PROCEDURE — 99232 SBSQ HOSP IP/OBS MODERATE 35: CPT | Performed by: PSYCHIATRY & NEUROLOGY

## 2019-10-29 RX ORDER — CHLORPROMAZINE HYDROCHLORIDE 25 MG/ML
25 INJECTION INTRAMUSCULAR ONCE
Status: DISCONTINUED | OUTPATIENT
Start: 2019-10-29 | End: 2019-10-29

## 2019-10-29 RX ORDER — CLONAZEPAM 0.5 MG/1
0.5 TABLET ORAL 3 TIMES DAILY
Status: COMPLETED | OUTPATIENT
Start: 2019-10-29 | End: 2019-10-29

## 2019-10-29 RX ORDER — CHLORPROMAZINE HYDROCHLORIDE 25 MG/ML
50 INJECTION INTRAMUSCULAR ONCE
Status: COMPLETED | OUTPATIENT
Start: 2019-10-29 | End: 2019-10-29

## 2019-10-29 RX ORDER — CHLORPROMAZINE HYDROCHLORIDE 100 MG/1
100 TABLET, FILM COATED ORAL
Status: DISCONTINUED | OUTPATIENT
Start: 2019-10-29 | End: 2019-10-31

## 2019-10-29 RX ORDER — CLONAZEPAM 1 MG/1
1 TABLET ORAL 3 TIMES DAILY
Status: DISCONTINUED | OUTPATIENT
Start: 2019-10-29 | End: 2019-10-30

## 2019-10-29 RX ADMIN — CHLORPROMAZINE HYDROCHLORIDE 50 MG: 25 INJECTION INTRAMUSCULAR at 14:06

## 2019-10-29 RX ADMIN — GABAPENTIN 600 MG: 300 CAPSULE ORAL at 21:39

## 2019-10-29 RX ADMIN — GABAPENTIN 600 MG: 300 CAPSULE ORAL at 16:20

## 2019-10-29 RX ADMIN — CLONAZEPAM 1 MG: 1 TABLET ORAL at 21:39

## 2019-10-29 RX ADMIN — ZINC 1 TABLET: TAB ORAL at 07:27

## 2019-10-29 RX ADMIN — PALIPERIDONE 3 MG: 3 TABLET, EXTENDED RELEASE ORAL at 07:21

## 2019-10-29 RX ADMIN — PRAVASTATIN SODIUM 20 MG: 20 TABLET ORAL at 16:20

## 2019-10-29 RX ADMIN — CLONAZEPAM 1 MG: 1 TABLET ORAL at 16:20

## 2019-10-29 RX ADMIN — CLONAZEPAM 0.5 MG: 0.5 TABLET ORAL at 12:23

## 2019-10-29 RX ADMIN — NICOTINE 1 PATCH: 14 PATCH, EXTENDED RELEASE TRANSDERMAL at 07:23

## 2019-10-29 RX ADMIN — LORAZEPAM 0.5 MG: 0.5 TABLET ORAL at 07:26

## 2019-10-29 RX ADMIN — Medication 100 MG: at 07:22

## 2019-10-29 RX ADMIN — GABAPENTIN 600 MG: 300 CAPSULE ORAL at 07:15

## 2019-10-29 RX ADMIN — LIDOCAINE 1 PATCH: 50 PATCH TOPICAL at 07:14

## 2019-10-29 RX ADMIN — BACITRACIN ZINC, NEOMYCIN, POLYMYXIN B 1 LARGE APPLICATION: 400; 3.5; 5 OINTMENT TOPICAL at 18:16

## 2019-10-29 RX ADMIN — CHLORPROMAZINE HYDROCHLORIDE 100 MG: 100 TABLET, SUGAR COATED ORAL at 21:39

## 2019-10-29 RX ADMIN — BACITRACIN ZINC, NEOMYCIN, POLYMYXIN B 1 LARGE APPLICATION: 400; 3.5; 5 OINTMENT TOPICAL at 07:24

## 2019-10-29 NOTE — PLAN OF CARE
Problem: Ineffective Coping  Goal: Participates in unit activities  Description  Interventions:  - Provide therapeutic environment   - Provide required programming   - Redirect inappropriate behaviors   Outcome: Progressing     Problem: Ineffective Coping  Goal: Cooperates with admission process  Description  Interventions:   - Complete admission process  Outcome: Progressing  Goal: Identifies ineffective coping skills  Outcome: Progressing  Goal: Identifies healthy coping skills  Outcome: Progressing  Goal: Demonstrates healthy coping skills  Outcome: Progressing  Goal: Patient/Family participate in treatment and DC plans  Description  Interventions:  - Provide therapeutic environment  Outcome: Progressing  Goal: Patient/Family verbalizes awareness of resources  Outcome: Progressing  Goal: Understands least restrictive measures  Description  Interventions:  - Utilize least restrictive behavior  Outcome: Progressing  Goal: Free from restraint events  Description  - Utilize least restrictive measures   - Provide behavioral interventions   - Redirect inappropriate behaviors   Outcome: Progressing     Problem: Depression  Goal: Treatment Goal: Demonstrate behavioral control of depressive symptoms, verbalize feelings of improved mood/affect, and adopt new coping skills prior to discharge  Outcome: Progressing  Goal: Verbalize thoughts and feelings  Description  Interventions:  - Assess and re-assess patient's level of risk   - Engage patient in 1:1 interactions, daily, for a minimum of 15 minutes   - Encourage patient to express feelings, fears, frustrations, hopes   Outcome: Progressing  Goal: Refrain from harming self  Description  Interventions:  - Monitor patient closely, per order   - Supervise medication ingestion, monitor effects and side effects   Outcome: Progressing  Goal: Refrain from isolation  Description  Interventions:  - Develop a trusting relationship   - Encourage socialization   Outcome: Progressing  Goal: Refrain from self-neglect  Outcome: Progressing  Goal: Attend and participate in unit activities, including therapeutic, recreational, and educational groups  Description  Interventions:  - Provide therapeutic and educational activities daily, encourage attendance and participation, and document same in the medical record   Outcome: Progressing  Goal: Complete daily ADLs, including personal hygiene independently, as able  Description  Interventions:  - Observe, teach, and assist patient with ADLS  -  Monitor and promote a balance of rest/activity, with adequate nutrition and elimination   Outcome: Progressing     Problem: Anxiety  Goal: Anxiety is at manageable level  Description  Interventions:  - Assess and monitor patient's anxiety level  - Monitor for signs and symptoms (heart palpitations, chest pain, shortness of breath, headaches, nausea, feeling jumpy, restlessness, irritable, apprehensive)  - Collaborate with interdisciplinary team and initiate plan and interventions as ordered    - [place] patient to unit/surroundings  - Explain treatment plan  - Encourage participation in care  - Encourage verbalization of concerns/fears  - Identify coping mechanisms  - Assist in developing anxiety-reducing skills  - Administer/offer alternative therapies  - Limit or eliminate stimulants  Outcome: Progressing

## 2019-10-29 NOTE — PROGRESS NOTES
10/29/19 0927   Team Meeting   Meeting Type Daily Rounds   Initial Conference Date 10/29/19   Patient/Family Present   Patient Present No   Patient's Family Present No     Daily Psychiatric Rounds     Team Members Present:     MD Mey Wood, [id number] [facility], MSW [facility] [place], [place]  Anai Cruz RN    Labile  Slamming and screaming this AM   [facility] given yesterday  PRNs given yesterday  Poor sleep  Active hallucinations  Pacing  Cursing  Agitation

## 2019-10-29 NOTE — PROGRESS NOTES
No further yelling out, agitation or anxiety  No noted suicidal ideations or homicidal behaviors  Patient observed sleeping during most q 7 minute safety checks  No observable distress or change in medical condition  Will continue to monitor

## 2019-10-29 NOTE — PROGRESS NOTES
Patient awake at present, pacing hallway talking to self, agitated  Asked if she needed a PRN  She refused  Responding to internal stimuli  Active hallucinations currently  Yelling, and conversing to an empty room  Monitoring presently

## 2019-10-29 NOTE — PROGRESS NOTES
Patient in room screaming, crying, cursing in reaction to her auditory hallucinations, unable to verbally redirect, support provided  Patient refused Haldol and Risperdal  D  Daina COLEMAN aware of and witnessed behaviors, new order noted  One time dose of Thorazine given as ordered

## 2019-10-29 NOTE — PROGRESS NOTES
No further behavioral outbursts, patient remains controlled post Thorazine administration  Remains on 7" checks for safety and behaviors

## 2019-10-29 NOTE — PROGRESS NOTES
Patient visible throughout unit, pacing hallways  Mood and affect labile with prolonged verbal outbursts consisting of yelling, crying and cursing, slamming doors  Patient appears to be reacting to internal preoccupation, speech pressured, rambling  Patient refused PRN Risperdal for hallucinations  Patient guarded, would not elaborate on hallucinations, irritable  Patient denies SI/HI, depression  Remains medication compliant and on 7" checks for safety and behaviors

## 2019-10-29 NOTE — PROGRESS NOTES
Marimar Speaks admitted to 2 stressful visits and an increase in hallucinations after  Yelling out loud  Very anger  Risperdal M-Tab 1 mg given at [facility]  No effect  Ativan 0 5 mg given PRN at 2152  Mildly effective  Needed redirection and extensive 1 to 1 from nursing staff  Compliant with medications  Maintained on Q 7 minute safety checks  No suicidal ideations, and/or homicidal behaviors  No changes in medical condition  Fluids maintained at bedside to promote hydration

## 2019-10-29 NOTE — PLAN OF CARE
PT continues to attend most art therapy groups where most often she elects to engage in projects of choice  PT is mostly quiet and withdrawn among peers in the group setting, however does appear more comfortable in speaking with AT 1:1 rather than in the group setting  PT has been calm and cooperative during group time       Problem: Ineffective Coping  Goal: Participates in unit activities  Description  Interventions:  - Provide therapeutic environment   - Provide required programming   - Redirect inappropriate behaviors   Outcome: Progressing

## 2019-10-30 RX ORDER — DIPHENHYDRAMINE HYDROCHLORIDE 50 MG/ML
50 INJECTION INTRAMUSCULAR; INTRAVENOUS EVERY 6 HOURS PRN
Status: DISCONTINUED | OUTPATIENT
Start: 2019-10-30 | End: 2019-11-07 | Stop reason: HOSPADM

## 2019-10-30 RX ORDER — CLONAZEPAM 1 MG/1
1 TABLET ORAL 3 TIMES DAILY
Status: DISCONTINUED | OUTPATIENT
Start: 2019-10-30 | End: 2019-11-07 | Stop reason: HOSPADM

## 2019-10-30 RX ORDER — DIPHENHYDRAMINE HCL 25 MG
50 TABLET ORAL EVERY 6 HOURS PRN
Status: DISCONTINUED | OUTPATIENT
Start: 2019-10-30 | End: 2019-11-07 | Stop reason: HOSPADM

## 2019-10-30 RX ORDER — CLONAZEPAM 1 MG/1
1 TABLET ORAL 3 TIMES DAILY
Status: DISCONTINUED | OUTPATIENT
Start: 2019-10-30 | End: 2019-10-30

## 2019-10-30 RX ORDER — CHLORPROMAZINE HYDROCHLORIDE 25 MG/1
50 TABLET, FILM COATED ORAL DAILY
Status: DISCONTINUED | OUTPATIENT
Start: 2019-10-30 | End: 2019-10-31

## 2019-10-30 RX ADMIN — GABAPENTIN 600 MG: 300 CAPSULE ORAL at 16:09

## 2019-10-30 RX ADMIN — CLONAZEPAM 1 MG: 1 TABLET ORAL at 13:22

## 2019-10-30 RX ADMIN — CLONAZEPAM 1 MG: 1 TABLET ORAL at 20:33

## 2019-10-30 RX ADMIN — GABAPENTIN 600 MG: 300 CAPSULE ORAL at 20:33

## 2019-10-30 RX ADMIN — PALIPERIDONE 3 MG: 3 TABLET, EXTENDED RELEASE ORAL at 07:52

## 2019-10-30 RX ADMIN — ZINC 1 TABLET: TAB ORAL at 08:43

## 2019-10-30 RX ADMIN — BACITRACIN ZINC, NEOMYCIN, POLYMYXIN B 1 LARGE APPLICATION: 400; 3.5; 5 OINTMENT TOPICAL at 08:01

## 2019-10-30 RX ADMIN — PRAVASTATIN SODIUM 20 MG: 20 TABLET ORAL at 16:09

## 2019-10-30 RX ADMIN — Medication 100 MG: at 07:53

## 2019-10-30 RX ADMIN — CHLORPROMAZINE HYDROCHLORIDE 100 MG: 100 TABLET, SUGAR COATED ORAL at 20:33

## 2019-10-30 RX ADMIN — BACITRACIN ZINC, NEOMYCIN, POLYMYXIN B 1 LARGE APPLICATION: 400; 3.5; 5 OINTMENT TOPICAL at 17:43

## 2019-10-30 RX ADMIN — NICOTINE 1 PATCH: 14 PATCH, EXTENDED RELEASE TRANSDERMAL at 08:38

## 2019-10-30 RX ADMIN — LIDOCAINE 1 PATCH: 50 PATCH TOPICAL at 07:56

## 2019-10-30 RX ADMIN — CHLORPROMAZINE HYDROCHLORIDE 50 MG: 25 TABLET, SUGAR COATED ORAL at 11:30

## 2019-10-30 RX ADMIN — GABAPENTIN 600 MG: 300 CAPSULE ORAL at 07:52

## 2019-10-30 RX ADMIN — ALUMINUM HYDROXIDE, MAGNESIUM HYDROXIDE, AND SIMETHICONE 30 ML: 200; 200; 20 SUSPENSION ORAL at 18:22

## 2019-10-30 RX ADMIN — CLONAZEPAM 1 MG: 1 TABLET ORAL at 07:53

## 2019-10-30 NOTE — PROGRESS NOTES
Pt slept through most of the night  Woke once to change sheets, pillow case due to sweating  Beverage given  Pt returned to sleep  Will continue to monitor

## 2019-10-30 NOTE — PROGRESS NOTES
Pt has been sleeping since the beginning of the shift  Pt did wake and HS medications administered  Pt stated she was tired and returned to sleep  Did not answer further questions  Will continue to monitor

## 2019-10-30 NOTE — PROGRESS NOTES
Progress Note - Behavioral Health   Toni Hare 51 y o  female MRN: [id number]  Unit/Bed#: [facility] 224-01 Encounter: [id number]    Assessment/Plan   Principal Problem:    Schizoaffective disorder, bipolar type ([place] [id number] )  Active Problems:    Uncomplicated alcohol dependence ([email or web address] 75 )    Medical clearance for psychiatric admission    Dyslipidemia    Carbuncle    P:  Start Thorazine 100 mg at bedtime  Discontinue Ativan and start Klonopin 1 mg t i d     Behavior over the last 24 hours:  Unchanged  Patient continues to have those explosive and extremely loud episodes of screaming and cursing  She reports that she hears voices which triggers her  Even though patient is resistant to trying new medications but she was agreeable to start Thorazine and Klonopin to help control her symptoms    Sleep: insomnia  Appetite: poor  Medication side effects: No  ROS: no complaints    Mental Status Evaluation:  Appearance:  disheveled   Behavior:  psychomotor agitation   Speech:  loud   Mood:  irritable   Affect:  increased in range   Thought Process:  disorganized   Thought Content:  delusions  persecutory   Perceptual Disturbances: Auditory hallucinations without commands   Risk Potential: Potential for Aggression No   Sensorium:  person and place   Cognition:  grossly intact   Consciousness:  awake    Attention: attention span appeared shorter than expected for age   Insight:  limited   Judgment: limited   Gait/Station: normal gait/station   Motor Activity: no abnormal movements     Progress Toward Goals: ongoing    Recommended Treatment: Continue with group therapy, milieu therapy and occupational therapy  Risks, benefits and possible side effects of Medications:   Risks, benefits, and possible side effects of medications explained to patient and patient verbalizes understanding  Medications: continue current psychiatric medications      Labs: reviewed    Counseling / Coordination of Care  Total floor / unit time spent today 15 minutes  Greater than 50% of total time was spent with the patient and / or family counseling and / or coordination of care   A description of the counseling / coordination of care:

## 2019-10-30 NOTE — PROGRESS NOTES
Pt was seen on the unit, pt is currently  Cooperative with care  Pt denies current si/hi at this time  Pt has been pacing the hallways and remains medication compliant  Pt does exhibit some paranoid thoughts about staff laughing at her  Pt reassured that's not occurring  Will continue to monitor

## 2019-10-30 NOTE — PROGRESS NOTES
Progress Note - Behavioral Health     Lucy Mcmanus 51 y o  female MRN: [id number]   Unit/Bed#: [facility] 224-01 Encounter: [id number]    Behavior over the last 24 hours:      Karla Duque was seen for an inpatient follow-up psychiatric visit this date  At today's visit, she relates she is feeling less psychotic  She thinks the Thorazine is helping  Per psychiatric rounds, she is still experiencing poor sleep with outbursts of screaming when her auditory hallucinations increased  At these times, she is extremely paranoid, anxious, and suspicious  She has not had any aggressive behaviors toward staff or peers  She is taking her medications as prescribed  Her only side effect is that she feels a little tired at times  Appetite is within normal limits  She continues to require inpatient hospitalization to stabilize her mood and decrease psychotic symptoms  Medications are currently being adjusted      ROS: no complaints    Mental Status Evaluation:    Appearance:  casually dressed, dressed appropriately   Behavior:  pleasant, cooperative   Speech:  normal rate and volume   Mood:  dysphoric, anxious, labile   Affect:  appropriate   Thought Process:  logical, coherent, linear   Associations: intact associations   Thought Content:  no overt delusions   Perceptual Disturbances: auditory hallucinations   Risk Potential: Suicidal ideation - None  Homicidal ideation - None  Potential for aggression - No   Sensorium:  oriented to person, place and time/date   Memory:  recent and remote memory grossly intact   Consciousness:  alert and awake   Attention: poor concentration and poor attention span   Insight:  limited   Judgment: limited   Gait/Station: normal gait/station, normal balance   Motor Activity: no abnormal movements     Vital signs in last 24 hours:    Temp:  [97 8 °F (36 6 °C)-98 4 °F (36 9 °C)] 97 8 °F (36 6 °C)  HR:  [] 103  Resp:  [16] 16  BP: (105-114)/(66-70) 114/70    Laboratory results:  I have personally reviewed all pertinent laboratory/tests results  Progress Toward Goals: progressing    Assessment/Plan   Principal Problem:    Schizoaffective disorder, bipolar type (HCC)  Active Problems:    Uncomplicated alcohol dependence ([address] )    Medical clearance for psychiatric admission    Dyslipidemia    Carbuncle    Recommended Treatment:     Thorazine increased to 50 mg daily and 100 mg q h s   Second Invega injection administration moved up to Friday 11/1  Will continue to monitor  Discharge disposition and planning are ongoing        All current active medications have been reviewed  Encourage group therapy, milieu therapy and occupational therapy  Behavioral Health checks every 7 minutes      Current Facility-Administered Medications:  acetaminophen 650 mg Oral Q6H PRN Isis Lama, CRNP   acetaminophen 650 mg Oral Q4H PRN Isis Lama, CRNP   acetaminophen 975 mg Oral Q6H PRN Isis Lama, CRNP   aluminum-magnesium hydroxide-simethicone 30 mL Oral Q4H PRN Marcin De La Torre MD   benztropine 1 mg Intramuscular Q6H PRN Marcin De La Torre MD   benztropine 1 mg Oral Q6H PRN Marcin De La Torre MD   chlorproMAZINE 100 mg Oral HS Jeannette Rodriguez MD   chlorproMAZINE 50 mg Oral Daily Isis Lama, CRNP   clonazePAM 1 mg Oral TID Jeannette Rodriguez MD   diphenhydrAMINE 50 mg Intramuscular Q6H PRN Isis Lama, CRNP   diphenhydrAMINE 50 mg Oral Q6H PRN Isis Lama, CRNP   gabapentin 600 mg Oral TID Isis Lama, CRNP   hydrocortisone  Topical 4x Daily PRN Isis Lama, CRNP   hydrOXYzine HCL 50 mg Oral Q4H PRN Isis Lama, CRNP   lidocaine 1 patch Topical Daily Pushpa Kline PA-C   LORazepam 2 mg Intramuscular Q4H PRN Isis Lama, CRNP   LORazepam 0 5 mg Oral Q4H PRN Racheal Mejía, RENATANP   magnesium hydroxide 30 mL Oral Daily PRN Marcin De La Torre MD   multivitamin stress formula 1 tablet Oral Daily Fide Rutherford PA-C   neomycin-bacitracin-polymyxin b 1 large application Topical BID Terry Maurer MD   nicotine 1 patch Transdermal Daily JARED Jones   OLANZapine 10 mg Intramuscular Q3H PRN JARED Gonzalez   paliperidone 3 mg Oral Daily JARED Jones   [START ON 11/1/2019] paliperidone palmitate  mg Intramuscular Once JARED Waters   pravastatin 20 mg Oral Daily With Plan A DrinkAPRYL Díaz MD   risperiDONE 1 mg Oral Q6H PRN Sang Corrales MD   thiamine 100 mg Oral Daily Manoj Gurrola PA-C   traZODone 50 mg Oral HS PRN Sang Corrales MD       Risks / Benefits of Treatment:    Risks, benefits, and possible side effects of medications explained to patient and patient verbalizes understanding and agreement for treatment  Counseling / Coordination of Care:      Patient's progress discussed with staff in treatment team meeting  Medications, treatment progress and treatment plan reviewed with patient

## 2019-10-30 NOTE — PROGRESS NOTES
10/30/19 0933   Team Meeting   Meeting Type Daily Rounds   Patient/Family Present   Patient Present No   Patient's Family Present No     Daily Psychiatric Rounds    Team Members Present:    Raenelle Memos, MD Marlinda Gouty, CRNP Evalina Nageotte, PharmD  Jessica Marcano, MSW  [facility], [facility]  Annette Alcocer, YUDY Nava, YUDY    Discussion:     Currently a no roommate for behaviors  Labile  Yelling out and angry  Paranoid  Suspicious  Billie Singer on board  Will look for improvements with danyel scheduled   To continue discussing possibility of mood stabilizer with patient

## 2019-10-31 PROCEDURE — 99232 SBSQ HOSP IP/OBS MODERATE 35: CPT | Performed by: PSYCHIATRY & NEUROLOGY

## 2019-10-31 RX ORDER — CHLORPROMAZINE HYDROCHLORIDE 100 MG/1
100 TABLET, FILM COATED ORAL DAILY
Status: DISCONTINUED | OUTPATIENT
Start: 2019-11-01 | End: 2019-10-31

## 2019-10-31 RX ORDER — CHLORPROMAZINE HYDROCHLORIDE 100 MG/1
100 TABLET, FILM COATED ORAL DAILY
Status: DISCONTINUED | OUTPATIENT
Start: 2019-11-01 | End: 2019-11-03

## 2019-10-31 RX ORDER — GABAPENTIN 300 MG/1
600 CAPSULE ORAL 3 TIMES DAILY
Status: COMPLETED | OUTPATIENT
Start: 2019-10-31 | End: 2019-11-03

## 2019-10-31 RX ORDER — CHLORPROMAZINE HYDROCHLORIDE 100 MG/1
100 TABLET, FILM COATED ORAL ONCE
Status: COMPLETED | OUTPATIENT
Start: 2019-10-31 | End: 2019-10-31

## 2019-10-31 RX ORDER — CHLORPROMAZINE HYDROCHLORIDE 25 MG/1
50 TABLET, FILM COATED ORAL 2 TIMES DAILY
Status: DISCONTINUED | OUTPATIENT
Start: 2019-10-31 | End: 2019-11-07 | Stop reason: HOSPADM

## 2019-10-31 RX ADMIN — HYDROCORTISONE 1 APPLICATION: 10 CREAM TOPICAL at 08:28

## 2019-10-31 RX ADMIN — BACITRACIN ZINC, NEOMYCIN, POLYMYXIN B 1 LARGE APPLICATION: 400; 3.5; 5 OINTMENT TOPICAL at 08:11

## 2019-10-31 RX ADMIN — GABAPENTIN 600 MG: 300 CAPSULE ORAL at 21:38

## 2019-10-31 RX ADMIN — CLONAZEPAM 1 MG: 1 TABLET ORAL at 11:14

## 2019-10-31 RX ADMIN — CLONAZEPAM 1 MG: 1 TABLET ORAL at 21:38

## 2019-10-31 RX ADMIN — ACETAMINOPHEN 650 MG: 325 TABLET ORAL at 12:34

## 2019-10-31 RX ADMIN — Medication 100 MG: at 08:12

## 2019-10-31 RX ADMIN — GABAPENTIN 600 MG: 300 CAPSULE ORAL at 11:14

## 2019-10-31 RX ADMIN — NICOTINE 1 PATCH: 14 PATCH, EXTENDED RELEASE TRANSDERMAL at 08:11

## 2019-10-31 RX ADMIN — PALIPERIDONE 3 MG: 3 TABLET, EXTENDED RELEASE ORAL at 08:12

## 2019-10-31 RX ADMIN — CHLORPROMAZINE HYDROCHLORIDE 100 MG: 100 TABLET, SUGAR COATED ORAL at 21:38

## 2019-10-31 RX ADMIN — ACETAMINOPHEN 975 MG: 325 TABLET ORAL at 01:38

## 2019-10-31 RX ADMIN — CHLORPROMAZINE HYDROCHLORIDE 50 MG: 25 TABLET, SUGAR COATED ORAL at 11:13

## 2019-10-31 RX ADMIN — GABAPENTIN 600 MG: 300 CAPSULE ORAL at 08:12

## 2019-10-31 RX ADMIN — LIDOCAINE 1 PATCH: 50 PATCH TOPICAL at 08:11

## 2019-10-31 RX ADMIN — CLONAZEPAM 1 MG: 1 TABLET ORAL at 08:12

## 2019-10-31 RX ADMIN — PRAVASTATIN SODIUM 20 MG: 20 TABLET ORAL at 16:11

## 2019-10-31 RX ADMIN — ZINC 1 TABLET: TAB ORAL at 08:12

## 2019-10-31 RX ADMIN — CHLORPROMAZINE HYDROCHLORIDE 50 MG: 25 TABLET, SUGAR COATED ORAL at 08:12

## 2019-10-31 RX ADMIN — BACITRACIN ZINC, NEOMYCIN, POLYMYXIN B 1 LARGE APPLICATION: 400; 3.5; 5 OINTMENT TOPICAL at 17:15

## 2019-10-31 RX ADMIN — ACETAMINOPHEN 975 MG: 325 TABLET ORAL at 20:05

## 2019-10-31 NOTE — PROGRESS NOTES
Pt woke at 0130 hrs  Irritable edge  States she is feeling better on her medications  Frequent requests for food and beverages  Has been pacing in the halls  Behaviors controlled  Will continue to monitor

## 2019-10-31 NOTE — PROGRESS NOTES
Team Members Present:   MD Roxana Rodriguez, JARED Dia, YUDY Bryan, W   Brie Farias [place]    She feels thorazine is effective  No screaming last night  Will get Invega IM tomorrow

## 2019-10-31 NOTE — PROGRESS NOTES
Pt has been withdrawn for most of the evening  Labile, intrusive, demanding then walks away  Poor focus  No episodes of yelling/cursing this evening  Hyperverbal   Will continue to monitor

## 2019-10-31 NOTE — PROGRESS NOTES
Pt was seen on the unit, pt remains pleasant and cooperative during the day, but remains obsessive with medication and discharge  Pt denies current si/hi at this time  Pt does appear to be restless and has been pacing the hallways, pt does appear to be tired at this time  Will continue to monitor

## 2019-10-31 NOTE — PROGRESS NOTES
Progress Note - Behavioral Health     Adriano Chicas 51 y o  female MRN: [id number]   Unit/Bed#: [facility] 224-01 Encounter: [id number]    Behavior over the last 24 hours:      Alvin Oshea was seen for an inpatient follow-up psychiatric visit this date  At today's visit, she remains labile, disorganized, anxious, and irritable  She relates she is feeling a little bit better but is still hearing voices  She is medication focused and requests constant changes  Her short-term memory is currently impaired  as evidenced by the fact that she keeps returning to the office to ask the same questions that were answered a few minutes before  She is pressured and hyperverbal and continues to require inpatient hospitalization due to mood instability, auditory hallucinations, and behavioral outbursts  She is due for her 2nd Invega injection tomorrow  ROS: Pain in arms due to injections  There is edema without warmth or erythema  Ice and Tylenol were administered      Mental Status Evaluation:    Appearance:  disheveled   Behavior:  continues to feel agitated, restless and fidgety   Speech:  pressured, hypertalkative   Mood:  dysphoric, anxious, labile, irritable   Affect:  mood-congruent   Thought Process:  disorganized, tangential   Associations: tangential associations   Thought Content:  no overt delusions   Perceptual Disturbances: auditory hallucinations   Risk Potential: Suicidal ideation - None  Homicidal ideation - None  Potential for aggression - No   Sensorium:  oriented to person, place and time/date   Memory:  recent memory poor   Consciousness:  alert and awake   Attention: poor concentration and poor attention span   Insight:  poor   Judgment: poor   Gait/Station: normal gait/station, normal balance   Motor Activity: no abnormal movements     Vital signs in last 24 hours:    Temp:  [98 2 °F (36 8 °C)-98 3 °F (36 8 °C)] 98 2 °F (36 8 °C)  HR:  [] 98  Resp:  [16] 16  BP: (113-115)/(75-79) 113/79    Laboratory results: I have personally reviewed all pertinent laboratory/tests results  Progress Toward Goals: progressing    Assessment/Plan   Principal Problem:    Schizoaffective disorder, bipolar type ([facility] )  Active Problems:    Uncomplicated alcohol dependence ([facility] 75 )    Medical clearance for psychiatric admission    Dyslipidemia    Refugiole    Recommended Treatment:     Medications adjusted  Continue to monitor  Discharge disposition and planning are ongoing        All current active medications have been reviewed  Encourage group therapy, milieu therapy and occupational therapy  Behavioral Health checks every 7 minutes      Current Facility-Administered Medications:  acetaminophen 650 mg Oral Q6H PRN Isis NAGEL Lodics, CRNP   acetaminophen 650 mg Oral Q4H PRN Isis Porterics, CRNP   acetaminophen 975 mg Oral Q6H PRN Isis Lama, CRNP   aluminum-magnesium hydroxide-simethicone 30 mL Oral Q4H PRN Marcin De La Torre MD   benztropine 1 mg Intramuscular Q6H PRN Marcin De La Torre MD   benztropine 1 mg Oral Q6H PRN Marcin De La Torre MD   [START ON 11/1/2019] chlorproMAZINE 100 mg Oral Daily Isis Lama, CRNP   chlorproMAZINE 50 mg Oral BID Isis Lama, CRNP   clonazePAM 1 mg Oral TID Isis Lama, CRNP   diphenhydrAMINE 50 mg Intramuscular Q6H PRN Isis Lama, CRNP   diphenhydrAMINE 50 mg Oral Q6H PRN Isis Lama, CRNP   gabapentin 600 mg Oral TID Isis Lama, CRNP   hydrocortisone  Topical 4x Daily PRN Isis Lama, CRNP   hydrOXYzine HCL 50 mg Oral Q4H PRN Isis Lama, CRNP   lidocaine 1 patch Topical Daily Pushpa Kline PA-C   LORazepam 2 mg Intramuscular Q4H PRN Isis Lama, CRNP   LORazepam 0 5 mg Oral Q4H PRN Racheal Mejía, CRNP   magnesium hydroxide 30 mL Oral Daily PRN Marcin De La Torre MD   multivitamin stress formula 1 tablet Oral Daily Fide Rutherford PA-C   neomycin-bacitracin-polymyxin b 1 large application Topical BID Josh Gutiérrez MD   nicotine 1 patch Transdermal Daily JARED Jones   OLANZapine 10 mg Intramuscular Q3H PRN JARED Tee   paliperidone 3 mg Oral Daily JARED Jones   [START ON 11/1/2019] paliperidone palmitate  mg Intramuscular Once JARED Waters   pravastatin 20 mg Oral Daily With [employer], MD   risperiDONE 1 mg Oral Q6H PRN Cherelle Hernández MD   thiamine 100 mg Oral Daily Dharmesh Dominguez PA-C   traZODone 50 mg Oral HS PRN Cherelle Hernández MD       Risks / Benefits of Treatment:    Risks, benefits, and possible side effects of medications explained to patient and patient verbalizes understanding and agreement for treatment  Counseling / Coordination of Care:      Patient's progress discussed with staff in treatment team meeting  Medications, treatment progress and treatment plan reviewed with patient

## 2019-11-01 PROCEDURE — 99232 SBSQ HOSP IP/OBS MODERATE 35: CPT | Performed by: PSYCHIATRY & NEUROLOGY

## 2019-11-01 RX ADMIN — ZINC 1 TABLET: TAB ORAL at 08:52

## 2019-11-01 RX ADMIN — PALIPERIDONE 3 MG: 3 TABLET, EXTENDED RELEASE ORAL at 08:52

## 2019-11-01 RX ADMIN — GABAPENTIN 600 MG: 300 CAPSULE ORAL at 21:12

## 2019-11-01 RX ADMIN — LORAZEPAM 0.5 MG: 0.5 TABLET ORAL at 18:52

## 2019-11-01 RX ADMIN — CLONAZEPAM 1 MG: 1 TABLET ORAL at 11:54

## 2019-11-01 RX ADMIN — CLONAZEPAM 1 MG: 1 TABLET ORAL at 08:52

## 2019-11-01 RX ADMIN — HYDROXYZINE HYDROCHLORIDE 50 MG: 25 TABLET ORAL at 01:09

## 2019-11-01 RX ADMIN — ACETAMINOPHEN 975 MG: 325 TABLET ORAL at 21:12

## 2019-11-01 RX ADMIN — GABAPENTIN 600 MG: 300 CAPSULE ORAL at 08:52

## 2019-11-01 RX ADMIN — Medication 100 MG: at 08:52

## 2019-11-01 RX ADMIN — CHLORPROMAZINE HYDROCHLORIDE 100 MG: 100 TABLET, SUGAR COATED ORAL at 21:12

## 2019-11-01 RX ADMIN — NICOTINE 1 PATCH: 14 PATCH, EXTENDED RELEASE TRANSDERMAL at 09:00

## 2019-11-01 RX ADMIN — CHLORPROMAZINE HYDROCHLORIDE 50 MG: 25 TABLET, SUGAR COATED ORAL at 11:54

## 2019-11-01 RX ADMIN — CLONAZEPAM 1 MG: 1 TABLET ORAL at 21:12

## 2019-11-01 RX ADMIN — ALUMINUM HYDROXIDE, MAGNESIUM HYDROXIDE, AND SIMETHICONE 30 ML: 200; 200; 20 SUSPENSION ORAL at 03:11

## 2019-11-01 RX ADMIN — ALUMINUM HYDROXIDE, MAGNESIUM HYDROXIDE, AND SIMETHICONE 30 ML: 200; 200; 20 SUSPENSION ORAL at 16:23

## 2019-11-01 RX ADMIN — PALIPERIDONE PALMITATE 156 MG: 156 INJECTION INTRAMUSCULAR at 11:19

## 2019-11-01 RX ADMIN — GABAPENTIN 600 MG: 300 CAPSULE ORAL at 11:54

## 2019-11-01 RX ADMIN — PRAVASTATIN SODIUM 20 MG: 20 TABLET ORAL at 16:23

## 2019-11-01 RX ADMIN — ACETAMINOPHEN 650 MG: 325 TABLET ORAL at 01:09

## 2019-11-01 RX ADMIN — LIDOCAINE 1 PATCH: 50 PATCH TOPICAL at 09:00

## 2019-11-01 RX ADMIN — CHLORPROMAZINE HYDROCHLORIDE 50 MG: 25 TABLET, SUGAR COATED ORAL at 08:52

## 2019-11-01 NOTE — PROGRESS NOTES
11/01/19 0938   Team Meeting   Meeting Type Daily Rounds   Patient/Family Present   Patient Present No   Patient's Family Present No     Daily Psychiatric Rounds    Team Members Present:    MD Nica Ca, JARED Gamboa, MSW  Lucille gan, SHAQUILLEW  Rudolph Camara, YUDY Lee, RN    Discussion:     Broken sleep  Labile but less yelling out  Agitated at times  Pacing  Invega Sustenna 156 due today  Poor focus  Discharge and medication fixated  CM working on finding placement

## 2019-11-01 NOTE — PROGRESS NOTES
Mood labile  Needs limit setting and redirection  Affect blunted  Fixated on discharge plan and housing  Impulsive behaviors  Insight and judgement fair  Able to communicate needs  Safety precautions maintained  Will continue to monitor and assess  judgement

## 2019-11-01 NOTE — PROGRESS NOTES
Administered 156 mg invega sustenna in right gluteal per pt request  Pt tolerated without complication  Safety precautions maintained  Will continue to monitor and assess

## 2019-11-01 NOTE — PROGRESS NOTES
Progress Note - Behavioral Health   Alexus Gilman 51 y o  female MRN: [id number]  Unit/Bed#: [facility] 224-01 Encounter: [id number]    Assessment/Plan   Principal Problem:    Schizoaffective disorder, bipolar type ([place] [address] )  Active Problems:    Uncomplicated alcohol dependence ([facility] 75 )    Medical clearance for psychiatric admission    Dyslipidemia    Carbuncle      Behavior over the last 24 hours:  Unchanged  Patient reports that the voices are less and she has been doing a little better  She reports sometimes she would get annoyed by them but she does not scream as much  She is understanding that it might take some time for a safe discharge plan and also for her to become stable  Sleep: hypersomnia  Appetite: increased  Medication side effects: No  ROS: no complaints    Mental Status Evaluation:  Appearance:  casually dressed   Behavior:  guarded   Speech:  soft   Mood:  sad   Affect:  inappropriate   Thought Process:  circumstantial   Thought Content:  normal   Perceptual Disturbances: Auditory hallucinations without commands   Risk Potential: Suicidal Ideations without plan   Sensorium:  person and place   Cognition:  grossly intact   Consciousness:  awake    Attention: attention span appeared shorter than expected for age   Insight:  fair   Judgment: limited   Gait/Station: slow   Motor Activity: no abnormal movements     Progress Toward Goals: reviewed    Recommended Treatment: Continue with group therapy, milieu therapy and occupational therapy  Risks, benefits and possible side effects of Medications:   Risks, benefits, and possible side effects of medications explained to patient and patient verbalizes understanding  Medications: continue current psychiatric medications  Labs: reviewed    Counseling / Coordination of Care  Total floor / unit time spent today 15 minutes  Greater than 50% of total time was spent with the patient and / or family counseling and / or coordination of care   A description of the counseling / coordination of care:

## 2019-11-01 NOTE — PROGRESS NOTES
Pt has been awake since approx 0200hrs  Frequent requests for medications, snacks, beverages  Hyperverbal, pacing in the halls

## 2019-11-01 NOTE — PROGRESS NOTES
Pt is visible on the unit, non-social with peers  Labile  Pt states the voices are improving and now the doctor has raised her thorazine  Orders were changed however, they were entered to start on 11/1/2019  Pt became agitated and confrontational and called staff liars because she knows the changes were made  No answer from on call Psych provider, and no answer from provider who entered the orders  Insight contacted and orders were obtained for a one time dose  Will continue to monitor

## 2019-11-02 PROCEDURE — 99231 SBSQ HOSP IP/OBS SF/LOW 25: CPT | Performed by: PSYCHIATRY & NEUROLOGY

## 2019-11-02 RX ADMIN — CHLORPROMAZINE HYDROCHLORIDE 50 MG: 25 TABLET, SUGAR COATED ORAL at 12:21

## 2019-11-02 RX ADMIN — HYDROXYZINE HYDROCHLORIDE 50 MG: 25 TABLET ORAL at 21:32

## 2019-11-02 RX ADMIN — GABAPENTIN 600 MG: 300 CAPSULE ORAL at 21:20

## 2019-11-02 RX ADMIN — CLONAZEPAM 1 MG: 1 TABLET ORAL at 12:21

## 2019-11-02 RX ADMIN — LORAZEPAM 0.5 MG: 0.5 TABLET ORAL at 10:25

## 2019-11-02 RX ADMIN — ZINC 1 TABLET: TAB ORAL at 08:20

## 2019-11-02 RX ADMIN — CHLORPROMAZINE HYDROCHLORIDE 50 MG: 25 TABLET, SUGAR COATED ORAL at 21:25

## 2019-11-02 RX ADMIN — GABAPENTIN 600 MG: 300 CAPSULE ORAL at 12:21

## 2019-11-02 RX ADMIN — GABAPENTIN 600 MG: 300 CAPSULE ORAL at 08:17

## 2019-11-02 RX ADMIN — NICOTINE 1 PATCH: 14 PATCH, EXTENDED RELEASE TRANSDERMAL at 09:00

## 2019-11-02 RX ADMIN — LIDOCAINE 1 PATCH: 50 PATCH TOPICAL at 08:20

## 2019-11-02 RX ADMIN — BACITRACIN ZINC, NEOMYCIN, POLYMYXIN B 1 LARGE APPLICATION: 400; 3.5; 5 OINTMENT TOPICAL at 18:45

## 2019-11-02 RX ADMIN — HYDROCORTISONE: 10 CREAM TOPICAL at 08:22

## 2019-11-02 RX ADMIN — CLONAZEPAM 1 MG: 1 TABLET ORAL at 08:20

## 2019-11-02 RX ADMIN — ALUMINUM HYDROXIDE, MAGNESIUM HYDROXIDE, AND SIMETHICONE 30 ML: 200; 200; 20 SUSPENSION ORAL at 16:49

## 2019-11-02 RX ADMIN — PRAVASTATIN SODIUM 20 MG: 20 TABLET ORAL at 16:49

## 2019-11-02 RX ADMIN — Medication 100 MG: at 08:20

## 2019-11-02 RX ADMIN — LORAZEPAM 0.5 MG: 0.5 TABLET ORAL at 21:36

## 2019-11-02 RX ADMIN — CHLORPROMAZINE HYDROCHLORIDE 50 MG: 25 TABLET, SUGAR COATED ORAL at 08:20

## 2019-11-02 RX ADMIN — CLONAZEPAM 1 MG: 1 TABLET ORAL at 21:20

## 2019-11-02 RX ADMIN — CHLORPROMAZINE HYDROCHLORIDE 100 MG: 100 TABLET, SUGAR COATED ORAL at 08:22

## 2019-11-02 RX ADMIN — BACITRACIN ZINC, NEOMYCIN, POLYMYXIN B 1 LARGE APPLICATION: 400; 3.5; 5 OINTMENT TOPICAL at 08:21

## 2019-11-02 NOTE — PROGRESS NOTES
Pt assessed in her room  Pt denies SI/HI/VH  AH at times, mumbling  Pt appears guarded and suspicious  Labile  Behaviors controlled    Will continue to monitor

## 2019-11-02 NOTE — PROGRESS NOTES
Patient is calm and controled and verbalized feeling good, participating in unit activities  Will continue to monitor

## 2019-11-02 NOTE — PROGRESS NOTES
Progress Note - [address] 51 y o  female MRN: [id number]   Unit/Bed#: [facility] 224-01 Encounter: [id number]    Behavior over the last 24 hours: minimal improvement  Khari Liriano still has paranoid thoughts "I still feel like someone is out to get me"  She still has labile mood and still reports vague auditory hallucinations, but states that "voices are less frequent"  She still has passive suicidal thought without a plan  She had an injection of [place] yesterday "I feel much better after taking it, less anxious"  Compliant with medications  Attends group therapy  Sleep: slept off and on "I am afraid of sleeping"  Appetite: normal  Medication side effects: No   ROS: reports arm pain and foot pain, denies any shortness of breath or chest pain    Mental Status Evaluation:    Appearance:  casually dressed   Behavior:  cooperative, slightly less guarded   Speech:  soft, circumstantial   Mood:  dysphoric, slightly less anxious   Affect:  inappropriate   Thought Process:  circumstantial   Associations: circumstantial associations   Thought Content:  some paranoia   Perceptual Disturbances: auditory hallucinations still present, but less frequent, no visual hallucinations   Risk Potential: Suicidal ideation - Yes, without plan  Homicidal ideation - None  Potential for aggression - No   Sensorium:  oriented to person, place and time/date   Memory:  recent and remote memory grossly intact   Consciousness:  alert and awake   Attention: decreased concentration and decreased attention span   Insight:  limited   Judgment: limited   Gait/Station: normal gait/station, normal balance   Motor Activity: no abnormal movements     Vital signs in last 24 hours:    Temp:  [97 5 °F (36 4 °C)-97 8 °F (36 6 °C)] 97 8 °F (36 6 °C)  HR:  [69-95] 69  Resp:  [16] 16  BP: (111-113)/(70-75) 111/70    Laboratory results: I have personally reviewed all pertinent laboratory/tests results      Most Recent Labs:   Lab Results Component Value Date    WBC 8 40 10/24/2019    RBC 4 39 10/24/2019    HGB 14 8 10/24/2019    HCT 44 3 10/24/2019     10/24/2019    RDW 13 6 10/24/2019    NEUTROABS 5 60 10/24/2019    SODIUM 134 10/24/2019    K 4 4 10/24/2019     10/24/2019    CO2 26 10/24/2019    BUN 7 10/24/2019    CREATININE 0 64 10/24/2019    GLUC 132 (H) 10/24/2019    CALCIUM 9 4 10/24/2019    AST 15 10/24/2019    ALT 13 10/24/2019    ALKPHOS 63 10/24/2019    TP 7 0 10/24/2019    ALB 4 1 10/24/2019    TBILI 0 20 10/24/2019    CHOLESTEROL 203 (H) 10/24/2019    HDL 53 10/24/2019    TRIG 270 (H) 10/24/2019    LDLCALC 96 10/24/2019    NONHDLC 150 10/24/2019    AMMONIA 28 10/27/2017    GUF2YAOEUZAC 2 190 10/24/2019    PREGSERUM Negative 10/21/2019    RPR Non-Reactive 10/25/2018    HGBA1C 5 0 08/06/2019    EAG 97 08/06/2019   Drug Screen:   Lab Results   Component Value Date    AMPMETHUR Positive (A) 10/21/2019    BARBTUR Negative 10/21/2019    BDZUR Negative 10/21/2019    THCUR Negative 10/21/2019    COCAINEUR Negative 10/21/2019    METHADONEUR Negative 10/21/2019    OPIATEUR Negative 10/21/2019    PCPUR Negative 10/21/2019   EKG   Lab Results   Component Value Date    VENTRATE 83 10/21/2019    ATRIALRATE 83 10/21/2019    PRINT 150 10/21/2019    QRSDINT 62 10/21/2019    QTINT 390 10/21/2019    QTCINT 458 10/21/2019    PAXIS 70 10/21/2019    QRSAXIS 71 10/21/2019    TWAVEAXIS 71 10/21/2019       Suicide/Homicide Risk Assessment:    Risk of Harm to Self:   Current Specific Risk Factors include: has suicidal ideation without a plan, mental illness diagnosis, current psychotic symptoms  Protective Factors: ability to communicate with staff on the unit, able to contract for safety on the unit  Based on today's assessment, Candy Ash presents the following risk of harm to self: moderate    Risk of Harm to Others:  Based on today's assessment, Candy Ash presents the following risk of harm to others: none    The following interventions are recommended: behavioral checks every 7 minutes, continued hospitalization on locked unit     Progress Toward Goals: minimal improvement, slightly less anxious, still reports suicidal thoughts, still has psychotic symptoms    Assessment/Plan   Principal Problem:    Schizoaffective disorder, bipolar type ([place] [address] )  Active Problems:    LYNN (generalized anxiety disorder)    Post-traumatic stress disorder, chronic    Uncomplicated alcohol dependence ([place] [address] )    Medical clearance for psychiatric admission    Dyslipidemia    Carbuncle    Recommended Treatment:     Planned medication and treatment changes:     All current active medications have been reviewed  Encourage group therapy, milieu therapy and occupational therapy  Behavioral Health checks every 7 minutes     Continue current medications:    Current Facility-Administered Medications:  acetaminophen 650 mg Oral Q6H PRN Isis N Lodics, CRNP   acetaminophen 650 mg Oral Q4H PRN Isis NAGEL Lodics, CRNP   acetaminophen 975 mg Oral Q6H PRN Isis Porterics, CRNP   aluminum-magnesium hydroxide-simethicone 30 mL Oral Q4H PRN Kimberly Pump, MD   benztropine 1 mg Intramuscular Q6H PRN Kimberly Pump, MD   benztropine 1 mg Oral Q6H PRN Kimberly Pump, MD   chlorproMAZINE 100 mg Oral Daily Isis Lama, CRNP   chlorproMAZINE 50 mg Oral BID Isis Lama, CRNP   clonazePAM 1 mg Oral TID Isis Lama, CRNP   diphenhydrAMINE 50 mg Intramuscular Q6H PRN Isis NAGEL Lodics, CRNP   diphenhydrAMINE 50 mg Oral Q6H PRN Isis NAGEL Lodics, CRNP   gabapentin 600 mg Oral TID Isis Porterics, CRNP   hydrocortisone  Topical 4x Daily PRN Isis NAGEL Lodics, CRNP   hydrOXYzine HCL 50 mg Oral Q4H PRN Isis Porterics, CRNP   lidocaine 1 patch Topical Daily Pushpa Kline PA-C   LORazepam 2 mg Intramuscular Q4H PRN Isis NAGEL Lodics, CRNP   LORazepam 0 5 mg Oral Q4H PRN Litzy Francis, JARED   magnesium hydroxide 30 mL Oral Daily PRN Kimberly Pump, MD   multivitamin stress formula 1 tablet Oral Daily Anson Varner PA-C   neomycin-bacitracin-polymyxin b 1 large application Topical BID Piyush Clark MD   nicotine 1 patch Transdermal Daily JARED Jones   OLANZapine 10 mg Intramuscular Q3H PRN JARED Jones   pravastatin 20 mg Oral Daily With Clarita Magallanes MD   risperiDONE 1 mg Oral Q6H PRN Emil Carpenter MD   thiamine 100 mg Oral Daily Anson Varner PA-C   traZODone 50 mg Oral HS PRN Emil Carpenter MD       Risks / Benefits of Treatment:    Risks, benefits, and possible side effects of medications explained to patient  Patient has limited understanding of risks and benefits of treatment at this time, but agrees to take medications as prescribed  Risks of medications in pregnancy explained if female patient  Patient verbalizes understanding and agrees to notify her doctor if she becomes pregnant  Counseling / Coordination of Care:    Patient's progress reviewed with nursing staff  Medications, treatment progress and treatment plan reviewed with patient      Peggy De Luna MD 11/02/19

## 2019-11-02 NOTE — PLAN OF CARE
Problem: Ineffective Coping  Goal: Cooperates with admission process  Description  Interventions:   - Complete admission process  Outcome: Progressing  Goal: Identifies ineffective coping skills  Outcome: Progressing  Goal: Identifies healthy coping skills  Outcome: Progressing  Goal: Demonstrates healthy coping skills  Outcome: Progressing  Goal: Patient/Family participate in treatment and DC plans  Description  Interventions:  - Provide therapeutic environment  Outcome: Progressing  Goal: Patient/Family verbalizes awareness of resources  Outcome: Progressing  Goal: Understands least restrictive measures  Description  Interventions:  - Utilize least restrictive behavior  Outcome: Progressing  Goal: Free from restraint events  Description  - Utilize least restrictive measures   - Provide behavioral interventions   - Redirect inappropriate behaviors   Outcome: Progressing     Problem: Depression  Goal: Treatment Goal: Demonstrate behavioral control of depressive symptoms, verbalize feelings of improved mood/affect, and adopt new coping skills prior to discharge  Outcome: Progressing  Goal: Verbalize thoughts and feelings  Description  Interventions:  - Assess and re-assess patient's level of risk   - Engage patient in 1:1 interactions, daily, for a minimum of 15 minutes   - Encourage patient to express feelings, fears, frustrations, hopes   Outcome: Progressing  Goal: Refrain from harming self  Description  Interventions:  - Monitor patient closely, per order   - Supervise medication ingestion, monitor effects and side effects   Outcome: Progressing  Goal: Refrain from isolation  Description  Interventions:  - Develop a trusting relationship   - Encourage socialization   Outcome: Progressing  Goal: Refrain from self-neglect  Outcome: Progressing  Goal: Attend and participate in unit activities, including therapeutic, recreational, and educational groups  Description  Interventions:  - Provide therapeutic and educational activities daily, encourage attendance and participation, and document same in the medical record   Outcome: Progressing  Goal: Complete daily ADLs, including personal hygiene independently, as able  Description  Interventions:  - Observe, teach, and assist patient with ADLS  -  Monitor and promote a balance of rest/activity, with adequate nutrition and elimination   Outcome: Progressing     Problem: Anxiety  Goal: Anxiety is at manageable level  Description  Interventions:  - Assess and monitor patient's anxiety level  - Monitor for signs and symptoms (heart palpitations, chest pain, shortness of breath, headaches, nausea, feeling jumpy, restlessness, irritable, apprehensive)  - Collaborate with interdisciplinary team and initiate plan and interventions as ordered    - Ravenel patient to unit/surroundings  - Explain treatment plan  - Encourage participation in care  - Encourage verbalization of concerns/fears  - Identify coping mechanisms  - Assist in developing anxiety-reducing skills  - Administer/offer alternative therapies  - Limit or eliminate stimulants  Outcome: Progressing     Problem: DISCHARGE PLANNING - CARE MANAGEMENT  Goal: Discharge to post-acute care or home with appropriate resources  Description  INTERVENTIONS:  - Conduct assessment to determine patient/family and health care team treatment goals, and need for post-acute services based on payer coverage, community resources, and patient preferences, and barriers to discharge  - Address psychosocial, clinical, and financial barriers to discharge as identified in assessment in conjunction with the patient/family and health care team  - Arrange appropriate level of post-acute services according to patient's   needs and preference and payer coverage in collaboration with the physician and health care team  - Communicate with and update the patient/family, physician, and health care team regarding progress on the discharge plan  - Arrange appropriate transportation to post-acute venues   Outcome: Progressing     Problem: Nutrition/Hydration-ADULT  Goal: Nutrient/Hydration intake appropriate for improving, restoring or maintaining nutritional needs  Description  Monitor and assess patient's nutrition/hydration status for malnutrition  Collaborate with interdisciplinary team and initiate plan and interventions as ordered  Monitor patient's weight and dietary intake as ordered or per policy  Utilize nutrition screening tool and intervene as necessary  Determine patient's food preferences and provide high-protein, high-caloric foods as appropriate       INTERVENTIONS:  - Monitor oral intake, urinary output, labs, and treatment plans  - Assess nutrition and hydration status and recommend course of action  - Evaluate amount of meals eaten  - Assist patient with eating if necessary   - Allow adequate time for meals  - Recommend/ encourage appropriate diets, oral nutritional supplements, and vitamin/mineral supplements  - Order, calculate, and assess calorie counts as needed  - Recommend, monitor, and adjust tube feedings and TPN/PPN based on assessed needs  - Assess need for intravenous fluids  - Provide specific nutrition/hydration education as appropriate  - Include patient/family/caregiver in decisions related to nutrition  Outcome: Progressing

## 2019-11-02 NOTE — PROGRESS NOTES
Patient is visible on the unit, participating in unit activities  Positive for morning meal  Cooperative with medications  Good eye contact  Patient verbalized feeling better on the medications  Pt states,  " really good for me, I slept a lot better, these meds are the right ones " Pt denies SI and HI currently, no behavioral problems  Patient is controled and calm  Alert and oriented  Able to make needs known  No signs or symptoms of distress  SP 1 and Q 7 minute checks will be maintained for patient safety  Will continue to monitor

## 2019-11-02 NOTE — PROGRESS NOTES
Patient requested PRN ativan for feeling anxious  Patient states " I feel good, I like the medications I just dont want to get myself to much further anxious  Patient is participating in coloring to decrease anxiety  Provided patient with PRN anxiety medication

## 2019-11-02 NOTE — PROGRESS NOTES
Patient attended group therapy and unit activities  Good eye contact  Patient expressed to staff that this is the best she has ever felt and feels the Lenora King is really helping her  Patient appreciative  Pt states, " this invega really did it, no voices, no hallucinations, this really works " Pt showered and participating in ADLs  Will continue to monitor

## 2019-11-03 PROBLEM — J98.8 CONGESTION OF RESPIRATORY TRACT: Status: ACTIVE | Noted: 2019-11-03

## 2019-11-03 PROCEDURE — 99231 SBSQ HOSP IP/OBS SF/LOW 25: CPT | Performed by: PSYCHIATRY & NEUROLOGY

## 2019-11-03 PROCEDURE — 99232 SBSQ HOSP IP/OBS MODERATE 35: CPT | Performed by: NURSE PRACTITIONER

## 2019-11-03 RX ORDER — GABAPENTIN 300 MG/1
600 CAPSULE ORAL 4 TIMES DAILY
Status: DISCONTINUED | OUTPATIENT
Start: 2019-11-04 | End: 2019-11-07 | Stop reason: HOSPADM

## 2019-11-03 RX ORDER — GABAPENTIN 300 MG/1
300 CAPSULE ORAL
Status: COMPLETED | OUTPATIENT
Start: 2019-11-03 | End: 2019-11-03

## 2019-11-03 RX ORDER — CHLORPROMAZINE HYDROCHLORIDE 100 MG/1
100 TABLET, FILM COATED ORAL
Status: DISCONTINUED | OUTPATIENT
Start: 2019-11-03 | End: 2019-11-03

## 2019-11-03 RX ORDER — FLUTICASONE PROPIONATE 50 MCG
1 SPRAY, SUSPENSION (ML) NASAL 2 TIMES DAILY
Status: DISCONTINUED | OUTPATIENT
Start: 2019-11-03 | End: 2019-11-07 | Stop reason: HOSPADM

## 2019-11-03 RX ORDER — GUAIFENESIN 600 MG
600 TABLET, EXTENDED RELEASE 12 HR ORAL EVERY 12 HOURS SCHEDULED
Status: DISCONTINUED | OUTPATIENT
Start: 2019-11-03 | End: 2019-11-07 | Stop reason: HOSPADM

## 2019-11-03 RX ADMIN — GUAIFENESIN 600 MG: 600 TABLET, EXTENDED RELEASE ORAL at 15:34

## 2019-11-03 RX ADMIN — CLONAZEPAM 1 MG: 1 TABLET ORAL at 20:26

## 2019-11-03 RX ADMIN — BACITRACIN ZINC, NEOMYCIN, POLYMYXIN B 1 LARGE APPLICATION: 400; 3.5; 5 OINTMENT TOPICAL at 08:47

## 2019-11-03 RX ADMIN — FLUTICASONE PROPIONATE 1 SPRAY: 50 SPRAY, METERED NASAL at 18:01

## 2019-11-03 RX ADMIN — GABAPENTIN 600 MG: 300 CAPSULE ORAL at 20:26

## 2019-11-03 RX ADMIN — PRAVASTATIN SODIUM 20 MG: 20 TABLET ORAL at 15:34

## 2019-11-03 RX ADMIN — CLONAZEPAM 1 MG: 1 TABLET ORAL at 11:55

## 2019-11-03 RX ADMIN — GUAIFENESIN 600 MG: 600 TABLET, EXTENDED RELEASE ORAL at 20:27

## 2019-11-03 RX ADMIN — CHLORPROMAZINE HYDROCHLORIDE 150 MG: 100 TABLET, SUGAR COATED ORAL at 20:26

## 2019-11-03 RX ADMIN — ALUMINUM HYDROXIDE, MAGNESIUM HYDROXIDE, AND SIMETHICONE 30 ML: 200; 200; 20 SUSPENSION ORAL at 16:13

## 2019-11-03 RX ADMIN — NICOTINE 1 PATCH: 14 PATCH, EXTENDED RELEASE TRANSDERMAL at 09:00

## 2019-11-03 RX ADMIN — GABAPENTIN 600 MG: 300 CAPSULE ORAL at 08:48

## 2019-11-03 RX ADMIN — CLONAZEPAM 1 MG: 1 TABLET ORAL at 08:49

## 2019-11-03 RX ADMIN — LIDOCAINE 1 PATCH: 50 PATCH TOPICAL at 08:48

## 2019-11-03 RX ADMIN — HYDROCORTISONE: 10 CREAM TOPICAL at 19:57

## 2019-11-03 RX ADMIN — CHLORPROMAZINE HYDROCHLORIDE 50 MG: 25 TABLET, SUGAR COATED ORAL at 11:55

## 2019-11-03 RX ADMIN — GABAPENTIN 300 MG: 300 CAPSULE ORAL at 20:27

## 2019-11-03 RX ADMIN — ZINC 1 TABLET: TAB ORAL at 08:49

## 2019-11-03 RX ADMIN — GABAPENTIN 600 MG: 300 CAPSULE ORAL at 11:55

## 2019-11-03 RX ADMIN — ALUMINUM HYDROXIDE, MAGNESIUM HYDROXIDE, AND SIMETHICONE 30 ML: 200; 200; 20 SUSPENSION ORAL at 11:55

## 2019-11-03 RX ADMIN — CHLORPROMAZINE HYDROCHLORIDE 50 MG: 25 TABLET, SUGAR COATED ORAL at 08:49

## 2019-11-03 RX ADMIN — CHLORPROMAZINE HYDROCHLORIDE 100 MG: 100 TABLET, SUGAR COATED ORAL at 08:49

## 2019-11-03 RX ADMIN — Medication 100 MG: at 08:49

## 2019-11-03 RX ADMIN — HYDROCORTISONE: 10 CREAM TOPICAL at 08:48

## 2019-11-03 NOTE — ASSESSMENT & PLAN NOTE
· Patient was seen and examined  Complaining of cold like symptoms  · Will continue supportive care, tylenol for fever, lozenges, flonase and mucinex

## 2019-11-03 NOTE — PROGRESS NOTES
Patient is visible on the unit out in the community  Participating in unit activities  Patient requested from provider to have additional Thorazine added to medications  Patient express feeling the best she has ever felt with no delusions or voices at all and states " I didn't even think it was going to be possible to get rid of all the voices but they are gone " Pt controled, has been activitly attempting to find housing arrangements for discharge  Pt denies SI and HI currently, no behavioral problems  Medication compliant  Alert and oriented  Able to make needs known  No signs or symptoms of distress  SP 1 and Q 7 minute checks will be maintained for patient safety  Will continue to monitor

## 2019-11-03 NOTE — PROGRESS NOTES
Hyper-verbal, awake, needy, multiple request   Pacing hallway, showered  Worried about where she is going to live after discharge  Appetite good  Improved insight into problems  Maintained on q 7 minute checks

## 2019-11-03 NOTE — PROGRESS NOTES
Progress Note - [address] 51 y o  female MRN: [id number]   Unit/Bed#: [facility] 224-01 Encounter: [id number]    Behavior over the last 24 hours: unchanged  Lucie Morris continues to report auditory hallucinations of "conversations" and visual hallucinations of "visions"  She still has labile mood, paranoid ideation and pressured speech  She denies suicidal thoughts today  She still reports anxiety symptoms "I become agitated"  Has been taking medications  Attends some groups  Sleep: decreased, slept off and on, early awakening  Appetite: normal  Medication side effects: No   ROS: reports arm pain, denies any shortness of breath or chest pain    Mental Status Evaluation:    Appearance:  casually dressed   Behavior:  cooperative, guarded   Speech:  circumstantial   Mood:  dysphoric, labile   Affect:  labile   Thought Process:  circumstantial   Associations: circumstantial associations   Thought Content:  paranoid ideation   Perceptual Disturbances: auditory hallucinations of "conversations", visual hallucinations of "visions", appears distracted   Risk Potential: Suicidal ideation - None at present  Homicidal ideation - None  Potential for aggression - No   Sensorium:  oriented to person, place and time/date   Memory:  recent and remote memory grossly intact   Consciousness:  alert and awake   Attention: poor concentration and poor attention span   Insight:  limited   Judgment: limited   Gait/Station: normal gait/station, normal balance   Motor Activity: no abnormal movements     Vital signs in last 24 hours:    Temp:  [97 9 °F (36 6 °C)-98 7 °F (37 1 °C)] 98 7 °F (37 1 °C)  HR:  [86-99] 86  Resp:  [16] 16  BP: (110-112)/(69-76) 110/76    Laboratory results: I have personally reviewed all pertinent laboratory/tests results      Suicide/Homicide Risk Assessment:    Risk of Harm to Self:   Current Specific Risk Factors include: mental illness diagnosis, presence of hallucinations, presence of paranoid ideation, poor impulse control  Protective Factors: no current suicidal ideation, ability to communicate with staff on the unit, taking medications as ordered on the unit  Based on today's assessment, Flaquito Ying presents the following risk of harm to self: moderate    Risk of Harm to Others:  Based on today's assessment, Flaquito Ying presents the following risk of harm to others: none    The following interventions are recommended: behavioral checks every 7 minutes, continued hospitalization on locked unit    Progress Toward Goals: no significant progress, still labile, not suicidal today, insight remains poor, still has psychotic symptoms    Assessment/Plan   Principal Problem:    Schizoaffective disorder, bipolar type ([facility] )  Active Problems:    LYNN (generalized anxiety disorder)    Post-traumatic stress disorder, chronic    Uncomplicated alcohol dependence ([facility] )    Medical clearance for psychiatric admission    Dyslipidemia    Refugiole    Recommended Treatment:     Planned medication and treatment changes:     All current active medications have been reviewed  Encourage group therapy, milieu therapy and occupational therapy  Behavioral Health checks every 7 minutes  Increase Thorazine and adjust dosing to 50 mg bid and 150 mg at bedtime to help with mood and psychotic symptoms  Increase Neurontin to 600 mg tid and 300 mg at bedtime today; increase Neurontin to 600 mg QID from tomorrow - to help with mood and anxiety    Continue all other medications:    Current Facility-Administered Medications:  acetaminophen 650 mg Oral Q6H PRN JARED Jones   acetaminophen 650 mg Oral Q4H PRN JARED Jones   acetaminophen 975 mg Oral Q6H PRN JARED Jones   aluminum-magnesium hydroxide-simethicone 30 mL Oral Q4H PRN Bijal Bajwa MD   benztropine 1 mg Intramuscular Q6H PRN Bijal Bajwa MD   benztropine 1 mg Oral Q6H PRN Bijal Bajwa MD   chlorproMAZINE 150 mg Oral HS Alana Mae MD   chlorproMAZINE 50 mg Oral BID JARED Jones   clonazePAM 1 mg Oral TID JARED Jones   diphenhydrAMINE 50 mg Intramuscular Q6H PRN JARED Jones   diphenhydrAMINE 50 mg Oral Q6H PRN JARED Jones   fluticasone 1 spray Each Nare BID JARED Damico   gabapentin 300 mg Oral HS Rhonda Chaparro MD   gabapentin 600 mg Oral TID Rhonda Chaparro MD   [START ON 11/4/2019] gabapentin 600 mg Oral 4x Daily Rhonda Chaparro MD   guaiFENesin 600 mg Oral Q12H [address] JARED Damico   hydrocortisone  Topical 4x Daily PRN JARED Jones   hydrOXYzine HCL 50 mg Oral Q4H PRN JARED Jones   lidocaine 1 patch Topical Daily Shyanne Martinez PA-C   LORazepam 2 mg Intramuscular Q4H PRN JARED Jones   LORazepam 0 5 mg Oral Q4H PRN JARED Castillo   magnesium hydroxide 30 mL Oral Daily PRN Candida Nyhan, MD   multivitamin stress formula 1 tablet Oral Daily Shyanne Martinez PA-C   neomycin-bacitracin-polymyxin b 1 large application Topical BID Jody Coreas MD   nicotine 1 patch Transdermal Daily JARED Jones   OLANZapine 10 mg Intramuscular Q3H PRN JARED Jones   pravastatin 20 mg Oral Daily With Rob Woods MD   risperiDONE 1 mg Oral Q6H PRN Candida Nyhan, MD   thiamine 100 mg Oral Daily Shyanne Martinez PA-C   traZODone 50 mg Oral HS PRN Candida Nyhan, MD       Risks / Benefits of Treatment:    Risks, benefits, and possible side effects of medications explained to patient  Patient has limited understanding of risks and benefits of treatment at this time, but agrees to take medications as prescribed  Risks of medications in pregnancy explained if female patient  Patient verbalizes understanding and agrees to notify her doctor if she becomes pregnant  Counseling / Coordination of Care:    Patient's progress reviewed with nursing staff  Medications, treatment progress and treatment plan reviewed with patient    Medication changes discussed with patient      Jasper Leigh MD 11/03/19

## 2019-11-03 NOTE — PROGRESS NOTES
Patient progressively becoming more agitated as the day went on  Positive for meals  Intrusive, needing redirection several times through out the day  Attending group but negative and disruptive  Patient continues to states she is doing the best she ever has on these medications  Patient offers complaints about not being able to sleep after dinner meal, educated patient on a good sleep cycle and not napping so late in the day  Pt become agitated and walked away  Will continue to monitor

## 2019-11-03 NOTE — PROGRESS NOTES
Patient states her Dionicio Schwartz has been helping her  Upset about her current order of Thorazine  Note to doctor placed in chart  Denies current hallucinations  States that she still has occasional mild ones  Mood more stable  Continues on q 7 minute checks

## 2019-11-03 NOTE — PLAN OF CARE
Problem: Ineffective Coping  Goal: Cooperates with admission process  Description  Interventions:   - Complete admission process  Outcome: Progressing  Goal: Identifies ineffective coping skills  Outcome: Progressing  Goal: Identifies healthy coping skills  Outcome: Progressing  Goal: Demonstrates healthy coping skills  Outcome: Progressing  Goal: Patient/Family participate in treatment and DC plans  Description  Interventions:  - Provide therapeutic environment  Outcome: Progressing  Goal: Patient/Family verbalizes awareness of resources  Outcome: Progressing  Goal: Understands least restrictive measures  Description  Interventions:  - Utilize least restrictive behavior  Outcome: Progressing  Goal: Free from restraint events  Description  - Utilize least restrictive measures   - Provide behavioral interventions   - Redirect inappropriate behaviors   Outcome: Progressing     Problem: DISCHARGE PLANNING - CARE MANAGEMENT  Goal: Discharge to post-acute care or home with appropriate resources  Description  INTERVENTIONS:  - Conduct assessment to determine patient/family and health care team treatment goals, and need for post-acute services based on payer coverage, community resources, and patient preferences, and barriers to discharge  - Address psychosocial, clinical, and financial barriers to discharge as identified in assessment in conjunction with the patient/family and health care team  - Arrange appropriate level of post-acute services according to patient's   needs and preference and payer coverage in collaboration with the physician and health care team  - Communicate with and update the patient/family, physician, and health care team regarding progress on the discharge plan  - Arrange appropriate transportation to post-acute venues   Outcome: Progressing     Problem: Nutrition/Hydration-ADULT  Goal: Nutrient/Hydration intake appropriate for improving, restoring or maintaining nutritional needs  Description  Monitor and assess patient's nutrition/hydration status for malnutrition  Collaborate with interdisciplinary team and initiate plan and interventions as ordered  Monitor patient's weight and dietary intake as ordered or per policy  Utilize nutrition screening tool and intervene as necessary  Determine patient's food preferences and provide high-protein, high-caloric foods as appropriate       INTERVENTIONS:  - Monitor oral intake, urinary output, labs, and treatment plans  - Assess nutrition and hydration status and recommend course of action  - Evaluate amount of meals eaten  - Assist patient with eating if necessary   - Allow adequate time for meals  - Recommend/ encourage appropriate diets, oral nutritional supplements, and vitamin/mineral supplements  - Order, calculate, and assess calorie counts as needed  - Recommend, monitor, and adjust tube feedings and TPN/PPN based on assessed needs  - Assess need for intravenous fluids  - Provide specific nutrition/hydration education as appropriate  - Include patient/family/caregiver in decisions related to nutrition  Outcome: Progressing

## 2019-11-03 NOTE — PROGRESS NOTES
Progress Note - Hayden Sheppard 1970, 51 y o  female MRN: [id number]    Unit/Bed#: [facility] 224-01 Encounter: [id number]    Primary Care Provider: JARED William   Date and time admitted to hospital: 10/22/2019 11:42 AM        Congestion of respiratory tract  Assessment & Plan  · Patient was seen and examined  Complaining of cold like symptoms  · Will continue supportive care, tylenol for fever, lozenges, flonase and mucinex  VTE Pharmacologic Prophylaxis: Pharmacologic: Pharmacologic VTE Prophylaxis contraindicated due to patient ambulatory     Patient Centered Rounds: I have performed bedside rounds with nursing staff today  Discussions with Specialists or Other Care Team Provider: nursing  Education and Discussions with Family / Patient: patient     Current Length of Stay: 12 day(s)    Current Patient Status: Inpatient Psych     Discharge Plan: per primary team     Code Status: Level 1 - Full Code    Subjective:   Having a cold like symptoms with sore throat, congestion and post-nasal drip  Denies fevers or chills  No cough  Objective:     Vitals:   Temp (24hrs), Av 3 °F (36 8 °C), Min:97 9 °F (36 6 °C), Max:98 7 °F (37 1 °C)    Temp:  [97 9 °F (36 6 °C)-98 7 °F (37 1 °C)] 98 7 °F (37 1 °C)  HR:  [86-99] 86  Resp:  [16] 16  BP: (110-112)/(69-76) 110/76  SpO2:  [91 %-98 %] 98 %  Body mass index is 22 3 kg/m²  Input and Output Summary (last 24 hours):     No intake or output data in the 24 hours ending 19 1441    Physical Exam:     Physical Exam   Constitutional: She is oriented to person, place, and time  She appears well-developed  No distress  Chronically ill appearing      HENT:   Head: Normocephalic and atraumatic  Mouth/Throat: Oropharynx is clear and moist    Poor dentition    Eyes: Pupils are equal, round, and reactive to light  Conjunctivae and EOM are normal    Neck: Normal range of motion  Neck supple  No thyromegaly present     Cardiovascular: Normal rate, regular rhythm and normal heart sounds  Exam reveals no gallop and no friction rub  No murmur heard  Pulmonary/Chest: Effort normal and breath sounds normal  She has no wheezes  She has no rales  Abdominal: Soft  Bowel sounds are normal  She exhibits no distension  There is no tenderness  There is no rebound  Musculoskeletal: Normal range of motion  She exhibits no edema, tenderness or deformity  Neurological: She is alert and oriented to person, place, and time  No cranial nerve deficit  Skin: Skin is warm and dry  No rash noted  No erythema  Psychiatric: She has a normal mood and affect  Her behavior is normal  Thought content normal    Vitals reviewed  Additional Data:     Labs: Invalid input(s): LABALBU                * I Have Reviewed All Lab Data Listed Above  * Additional Pertinent Lab Tests Reviewed:  [facility]  Reviewed    Imaging:  Imaging Reports Reviewed Today Include: n/a     Recent Cultures (last 7 days):           Last 24 Hours Medication List:     Current Facility-Administered Medications:  acetaminophen 650 mg Oral Q6H PRN Isis Lama, CRNP   acetaminophen 650 mg Oral Q4H PRN Isis Lama, CRNP   acetaminophen 975 mg Oral Q6H PRN Isis Lama, CRNP   aluminum-magnesium hydroxide-simethicone 30 mL Oral Q4H PRN Eladia Grewal MD   benztropine 1 mg Intramuscular Q6H PRN Eladia Grewal MD   benztropine 1 mg Oral Q6H PRN Eladia Grewal MD   chlorproMAZINE 150 mg Oral HS Melissa Robert MD   chlorproMAZINE 50 mg Oral BID Isis Lama, RENATANP   clonazePAM 1 mg Oral TID RENATA JonesNP   diphenhydrAMINE 50 mg Intramuscular Q6H PRN Isis Lama, CRNP   diphenhydrAMINE 50 mg Oral Q6H PRN Isis Lama, CRNP   fluticasone 1 spray Each Nare BID Ted Stewart, CRNP   gabapentin 300 mg Oral HS Melissa Robert MD   gabapentin 600 mg Oral TID Melissa Robert MD   [START ON 11/4/2019] gabapentin 600 mg Oral 4x Daily Luc Colunga MD   guaiFENesin 600 mg Oral Q12H [facility] JARED Juares   hydrocortisone  Topical 4x Daily PRN JARED Jones   hydrOXYzine HCL 50 mg Oral Q4H PRN JARED Jones   lidocaine 1 patch Topical Daily Wells Filter, PA-C   LORazepam 2 mg Intramuscular Q4H PRN JARED Jones   LORazepam 0 5 mg Oral Q4H PRN JARED Solo   magnesium hydroxide 30 mL Oral Daily PRN Jose Hidalgo MD   multivitamin stress formula 1 tablet Oral Daily Wells Filter, PA-C   neomycin-bacitracin-polymyxin b 1 large application Topical BID Marj Montoya MD   nicotine 1 patch Transdermal Daily JARED Jones   OLANZapine 10 mg Intramuscular Q3H PRN JARED Jones   pravastatin 20 mg Oral Daily With Wendi Carrillo MD   risperiDONE 1 mg Oral Q6H PRN Jose Hidalgo MD   thiamine 100 mg Oral Daily Wells Filter, PA-C   traZODone 50 mg Oral HS PRN Jose Hidalgo MD        Today, Patient Was Seen By: JARED Juares    ** Please Note: Dictation voice to text software may have been used in the creation of this document   **

## 2019-11-04 PROBLEM — N32.81 OVERACTIVE BLADDER: Status: ACTIVE | Noted: 2019-11-04

## 2019-11-04 PROCEDURE — 99232 SBSQ HOSP IP/OBS MODERATE 35: CPT | Performed by: PHYSICIAN ASSISTANT

## 2019-11-04 PROCEDURE — 99232 SBSQ HOSP IP/OBS MODERATE 35: CPT | Performed by: PSYCHIATRY & NEUROLOGY

## 2019-11-04 RX ORDER — OXYBUTYNIN CHLORIDE 5 MG/1
5 TABLET, EXTENDED RELEASE ORAL DAILY
Status: DISCONTINUED | OUTPATIENT
Start: 2019-11-04 | End: 2019-11-07 | Stop reason: HOSPADM

## 2019-11-04 RX ADMIN — NICOTINE 1 PATCH: 14 PATCH, EXTENDED RELEASE TRANSDERMAL at 09:00

## 2019-11-04 RX ADMIN — GUAIFENESIN 600 MG: 600 TABLET, EXTENDED RELEASE ORAL at 08:42

## 2019-11-04 RX ADMIN — Medication 100 MG: at 08:42

## 2019-11-04 RX ADMIN — PRAVASTATIN SODIUM 20 MG: 20 TABLET ORAL at 17:24

## 2019-11-04 RX ADMIN — HYDROCORTISONE: 10 CREAM TOPICAL at 07:01

## 2019-11-04 RX ADMIN — GUAIFENESIN 600 MG: 600 TABLET, EXTENDED RELEASE ORAL at 21:24

## 2019-11-04 RX ADMIN — GABAPENTIN 600 MG: 300 CAPSULE ORAL at 11:51

## 2019-11-04 RX ADMIN — CHLORPROMAZINE HYDROCHLORIDE 50 MG: 25 TABLET, SUGAR COATED ORAL at 08:42

## 2019-11-04 RX ADMIN — CHLORPROMAZINE HYDROCHLORIDE 50 MG: 25 TABLET, SUGAR COATED ORAL at 11:50

## 2019-11-04 RX ADMIN — CHLORPROMAZINE HYDROCHLORIDE 150 MG: 100 TABLET, SUGAR COATED ORAL at 21:24

## 2019-11-04 RX ADMIN — BACITRACIN ZINC, NEOMYCIN, POLYMYXIN B 1 LARGE APPLICATION: 400; 3.5; 5 OINTMENT TOPICAL at 07:01

## 2019-11-04 RX ADMIN — FLUTICASONE PROPIONATE 1 SPRAY: 50 SPRAY, METERED NASAL at 08:43

## 2019-11-04 RX ADMIN — HYDROCORTISONE: 10 CREAM TOPICAL at 17:24

## 2019-11-04 RX ADMIN — CLONAZEPAM 1 MG: 1 TABLET ORAL at 08:42

## 2019-11-04 RX ADMIN — OXYBUTYNIN CHLORIDE 5 MG: 5 TABLET, EXTENDED RELEASE ORAL at 14:00

## 2019-11-04 RX ADMIN — CLONAZEPAM 1 MG: 1 TABLET ORAL at 11:51

## 2019-11-04 RX ADMIN — ZINC 1 TABLET: TAB ORAL at 08:42

## 2019-11-04 RX ADMIN — GABAPENTIN 600 MG: 300 CAPSULE ORAL at 08:42

## 2019-11-04 RX ADMIN — GABAPENTIN 600 MG: 300 CAPSULE ORAL at 17:24

## 2019-11-04 RX ADMIN — BACITRACIN ZINC, NEOMYCIN, POLYMYXIN B 1 LARGE APPLICATION: 400; 3.5; 5 OINTMENT TOPICAL at 17:24

## 2019-11-04 RX ADMIN — CLONAZEPAM 1 MG: 1 TABLET ORAL at 21:24

## 2019-11-04 RX ADMIN — GABAPENTIN 600 MG: 300 CAPSULE ORAL at 21:24

## 2019-11-04 RX ADMIN — LIDOCAINE 1 PATCH: 50 PATCH TOPICAL at 08:41

## 2019-11-04 NOTE — PROGRESS NOTES
Progress Note - Behavioral Health   Hayden Sheppard 51 y o  female MRN: [id number]  Unit/Bed#: [facility] 224-01 Encounter: [id number]    Assessment/Plan   Principal Problem:    Schizoaffective disorder, bipolar type ([place] [address] )  Active Problems:    Uncomplicated alcohol dependence ([facility] )    LYNN (generalized anxiety disorder)    Post-traumatic stress disorder, chronic    Medical clearance for psychiatric admission    Dyslipidemia    Carbuncle    Congestion of respiratory tract    Overactive bladder      Behavior over the last 24 hours:  Improved  Patient feels the medications are helping and she is brody to sleep  She feels that she does not hear as much voices and she does not get agitated  Staff reports she can be labile and irritable and times  She is very needy and demanding with poor frustration tolewrance    Sleep: insomnia  Appetite: poor  Medication side effects: No  ROS: no complaints    Mental Status Evaluation:  Appearance:  disheveled   Behavior:  restless and fidgety   Speech:  loud   Mood:  anxious   Affect:  constricted   Thought Process:  circumstantial   Thought Content:  normal   Perceptual Disturbances: None   Risk Potential: Potential for Aggression No   Sensorium:  person and place   Cognition:  grossly intact   Consciousness:  awake    Attention: attention span appeared shorter than expected for age   Insight:  limited   Judgment: limited   Gait/Station: slow   Motor Activity: no abnormal movements     Progress Toward Goals: ongoing      Recommended Treatment: Continue with group therapy, milieu therapy and occupational therapy  Risks, benefits and possible side effects of Medications:   Risks, benefits, and possible side effects of medications explained to patient and patient verbalizes understanding  Medications: all current active meds have been reviewed  Labs: reviewed    Counseling / Coordination of Care  Total floor / unit time spent today 15 minutes   Greater than 50% of total time was spent with the patient and / or family counseling and / or coordination of care   A description of the counseling / coordination of care:

## 2019-11-04 NOTE — PROGRESS NOTES
11/04/19 0937   Team Meeting   Meeting Type Daily Rounds   Initial Conference Date 11/04/19   Patient/Family Present   Patient Present No   Patient's Family Present No       Daily Rounds Documentation    Team Members Present:   MD Toña Kyle CRNP Aldean Bodo, YUDY Hendrix, [facility], [facility]    Remains no roommate  Somatic complaints  Invega 156mg give on 11/1/19  Still labile at times  Pt states she has no control over her bladder; wearing a brief

## 2019-11-04 NOTE — ASSESSMENT & PLAN NOTE
· With incontinence  · UA on admission negative for UTI  · Plan to trial ditropan daily  · Follow up Urogyn as OP

## 2019-11-04 NOTE — PROGRESS NOTES
Progress Note - Jayson  1970, 51 y o  female MRN: [id number]    Unit/Bed#: [facility] 224-01 Encounter: [id number]    Primary Care Provider: JARED Fish   Date and time admitted to hospital: 10/22/2019 11:42 AM        Overactive bladder  Assessment & Plan  · With incontinence  · UA on admission negative for UTI  · Plan to trial ditropan daily  · Follow up Urogyn as OP        Patient Centered Rounds: I have performed bedside rounds with nursing staff today  Current Length of Stay: 13 day(s)    Current Patient Status: Inpatient Psych     Code Status: Level 1 - Full Code    Subjective:   Patient reports bladder incontinence for several months  She denies dysuria  She reports chronic back pain  Patient was seen ambulating in halls  No issues walking  Objective:     Vitals:   Temp (24hrs), Av 6 °F (36 4 °C), Min:97 5 °F (36 4 °C), Max:97 6 °F (36 4 °C)    Temp:  [97 5 °F (36 4 °C)-97 6 °F (36 4 °C)] 97 5 °F (36 4 °C)  HR:  [] 100  Resp:  [16] 16  BP: (106-135)/(70-91) 106/70  SpO2:  [93 %-96 %] 96 %  Body mass index is 22 3 kg/m²  Input and Output Summary (last 24 hours):     No intake or output data in the 24 hours ending 19 1259    Physical Exam:     Physical Exam   Constitutional: She is oriented to person, place, and time  She appears well-developed and well-nourished  HENT:   Head: Normocephalic and atraumatic  Eyes: Conjunctivae and EOM are normal  Right eye exhibits no discharge  Left eye exhibits no discharge  Neck: Normal range of motion  No tracheal deviation present  Cardiovascular: Normal rate, regular rhythm and normal heart sounds  Exam reveals no gallop and no friction rub  No murmur heard  Pulmonary/Chest: Effort normal and breath sounds normal  No respiratory distress  She has no wheezes  She has no rales  Abdominal: Soft  Bowel sounds are normal  She exhibits no distension and no mass  There is no tenderness  There is no guarding  Musculoskeletal: Normal range of motion  She exhibits no edema, tenderness or deformity  Neurological: She is alert and oriented to person, place, and time  Skin: Skin is warm and dry  No rash noted  No erythema  No pallor  Psychiatric: She has a normal mood and affect  Her behavior is normal    Nursing note and vitals reviewed  Additional Data:     Labs:    UA on admission negative    * I Have Reviewed All Lab Data Listed Above  * Additional Pertinent Lab Tests Reviewed:  [facility] Admission  Reviewed          Last 24 Hours Medication List:     Current Facility-Administered Medications:  acetaminophen 650 mg Oral Q6H PRN Isis N Lodics, CRNP   acetaminophen 650 mg Oral Q4H PRN Isis N Lodics, CRNP   acetaminophen 975 mg Oral Q6H PRN Isis Porterics, CRNP   aluminum-magnesium hydroxide-simethicone 30 mL Oral Q4H PRN Jeny Felix MD   benztropine 1 mg Intramuscular Q6H PRN Jeny Felix MD   benztropine 1 mg Oral Q6H PRN Jeny Felix MD   chlorproMAZINE 150 mg Oral HS Eileen Mcwilliams MD   chlorproMAZINE 50 mg Oral BID Isis Porterics, CRNP   clonazePAM 1 mg Oral TID Isis Lama, CRNP   diphenhydrAMINE 50 mg Intramuscular Q6H PRN Isis Porterics, CRNP   diphenhydrAMINE 50 mg Oral Q6H PRN Isis Porterics, CRNP   fluticasone 1 spray Each Nare BID Osvaldo Amador, RENATANP   gabapentin 600 mg Oral 4x Daily Eileen Mcwilliams MD   guaiFENesin 600 mg Oral Q12H [address] Osvaldo Amdaor, RENATANP   hydrocortisone  Topical 4x Daily PRN Isis NAGEL Lodics, CRNP   hydrOXYzine HCL 50 mg Oral Q4H PRN Isis Porterics, CRNP   lidocaine 1 patch Topical Daily Akil Doyle PA-C   LORazepam 2 mg Intramuscular Q4H PRN Isis Lama, CRNP   LORazepam 0 5 mg Oral Q4H PRN Samina Zee, RENATANP   magnesium hydroxide 30 mL Oral Daily PRN Jeny Felix MD   multivitamin stress formula 1 tablet Oral Daily Akil Doyle PA-C   neomycin-bacitracin-polymyxin b 1 large application Topical BID Thao Padilla MD   nicotine 1 patch Transdermal Daily JARED Jones   OLANZapine 10 mg Intramuscular Q3H PRN JARED Jones   oxybutynin 5 mg Oral Daily Akil Doyle PA-C   pravastatin 20 mg Oral Daily With Kaylin James MD   risperiDONE 1 mg Oral Q6H PRN Jeny Felix MD   thiamine 100 mg Oral Daily Akil Doyle PA-C   traZODone 50 mg Oral HS PRN Jeny Felix MD        Today, Patient Was Seen By: Akil Doyle PA-C    ** Please Note: Dictation voice to text software may have been used in the creation of this document   **

## 2019-11-04 NOTE — PROGRESS NOTES
Patient is visible on the unit, no actting out or yelling but restless and intrusive  Irritable  Patient verbalized she is ready to be discharged and wants to go today  Pt adamant about discharge  Needing redirection several times  Pt requested noon meds early due to agitation  Provided medications  Pt was seen by Sang Melchor for urine leakage, new orders for Ditropan  Patient verbalized an understanding for the medication with no further questions  Alert and oriented  Able to make needs known  No signs or symptoms of distress  SP 1 and Q 7 minute checks will be maintained for patient safety  Will continue to monitor

## 2019-11-04 NOTE — PROGRESS NOTES
Pt sought out this writer at approx 2030 for HS medication, reporting desire to get to bed early  On approach, pt presents clean and kempt, offering good eye contact  Calm  Controlled  Affect flat  Medication changes reviewed, pt attentive and denies questions  Pt reports her symptoms are improving  Reports decreased lability in mood, decreased auditory hallucinations and improved sleep  Pt does admit to fleeting auditory hallucinations but less so  Denies any recent aud comm acharya, and denies any SI current or recent  Pt attributes improvement to current med regimen  Pt is concerned with disposition  Reports her options are limited and, "not the best"  Is wondering if group home placement is in the works  Pt was encouraged to think about what a safe, structured environment that would support her progress would look like, and discuss such with the team in the am  Pt agreed  Pt reports she has had no bladder control or sensation x 1 month and has been using 4-5 briefs per day  Unsure if this is a new report, listed such on the SLIM board to be addressed tomorrow  Pt agrees to make needs known through the night   Will continue to monitor with 7 min checks

## 2019-11-04 NOTE — PROGRESS NOTES
Pt slept soundly through the night  No periods of waking noted until waking for that day at approx 0500  Appeared to sleep comfortably, without any visible signs on pain, discomfort or respiratory distress   7 minutes maintained incident free

## 2019-11-04 NOTE — PROGRESS NOTES
Pts   Swetha Powers, came to unit today 11/4/19 to drop off the pts  master card to her  Staff logged the [place] card with the pt  on a belonging sheet, placed it in a safe bag and secured the safe bag in the safe  Card will be returned to pt  Upon d/c

## 2019-11-04 NOTE — PLAN OF CARE
PT continues to attend most art therapy groups where she elects to work on projects of choice, has mostly been quiet and withdrawn among peers, however does seem more comfortable in speaking with AT 1:1    Problem: Ineffective Coping  Goal: Participates in unit activities  Description  Interventions:  - Provide therapeutic environment   - Provide required programming   - Redirect inappropriate behaviors   Outcome: Progressing

## 2019-11-05 PROCEDURE — 99232 SBSQ HOSP IP/OBS MODERATE 35: CPT | Performed by: PSYCHIATRY & NEUROLOGY

## 2019-11-05 RX ADMIN — GABAPENTIN 600 MG: 300 CAPSULE ORAL at 12:23

## 2019-11-05 RX ADMIN — CHLORPROMAZINE HYDROCHLORIDE 50 MG: 25 TABLET, SUGAR COATED ORAL at 08:07

## 2019-11-05 RX ADMIN — CLONAZEPAM 1 MG: 1 TABLET ORAL at 08:07

## 2019-11-05 RX ADMIN — GABAPENTIN 600 MG: 300 CAPSULE ORAL at 08:07

## 2019-11-05 RX ADMIN — ZINC 1 TABLET: TAB ORAL at 08:07

## 2019-11-05 RX ADMIN — CLONAZEPAM 1 MG: 1 TABLET ORAL at 12:23

## 2019-11-05 RX ADMIN — NICOTINE 1 PATCH: 14 PATCH, EXTENDED RELEASE TRANSDERMAL at 08:08

## 2019-11-05 RX ADMIN — LORAZEPAM 0.5 MG: 0.5 TABLET ORAL at 15:31

## 2019-11-05 RX ADMIN — GABAPENTIN 600 MG: 300 CAPSULE ORAL at 17:33

## 2019-11-05 RX ADMIN — ALUMINUM HYDROXIDE, MAGNESIUM HYDROXIDE, AND SIMETHICONE 30 ML: 200; 200; 20 SUSPENSION ORAL at 16:35

## 2019-11-05 RX ADMIN — GUAIFENESIN 600 MG: 600 TABLET, EXTENDED RELEASE ORAL at 08:07

## 2019-11-05 RX ADMIN — PRAVASTATIN SODIUM 20 MG: 20 TABLET ORAL at 15:32

## 2019-11-05 RX ADMIN — LIDOCAINE 1 PATCH: 50 PATCH TOPICAL at 08:08

## 2019-11-05 RX ADMIN — OXYBUTYNIN CHLORIDE 5 MG: 5 TABLET, EXTENDED RELEASE ORAL at 08:07

## 2019-11-05 RX ADMIN — CLONAZEPAM 1 MG: 1 TABLET ORAL at 21:24

## 2019-11-05 RX ADMIN — Medication 100 MG: at 08:08

## 2019-11-05 RX ADMIN — CHLORPROMAZINE HYDROCHLORIDE 150 MG: 100 TABLET, SUGAR COATED ORAL at 21:24

## 2019-11-05 RX ADMIN — GUAIFENESIN 600 MG: 600 TABLET, EXTENDED RELEASE ORAL at 21:24

## 2019-11-05 RX ADMIN — DIPHENHYDRAMINE HCL 50 MG: 25 TABLET ORAL at 10:20

## 2019-11-05 RX ADMIN — CHLORPROMAZINE HYDROCHLORIDE 50 MG: 25 TABLET, SUGAR COATED ORAL at 12:23

## 2019-11-05 RX ADMIN — GABAPENTIN 600 MG: 300 CAPSULE ORAL at 21:24

## 2019-11-05 RX ADMIN — FLUTICASONE PROPIONATE 1 SPRAY: 50 SPRAY, METERED NASAL at 08:08

## 2019-11-05 RX ADMIN — FLUTICASONE PROPIONATE 1 SPRAY: 50 SPRAY, METERED NASAL at 17:33

## 2019-11-05 NOTE — PROGRESS NOTES
Patient has been sleeping well through the night  Only came out once for a drink and went right back to bed  Behavior calm and controlled  States she is feeling much better  Q 7 minute safety checks maintained

## 2019-11-05 NOTE — PROGRESS NOTES
Progress Note - Behavioral Health   Davee Mcardle 51 y o  female MRN: [id number]  Unit/Bed#: [facility] 224-01 Encounter: [id number]    Assessment/Plan   Principal Problem:    Schizoaffective disorder, bipolar type ([place] [address] )  Active Problems:    Uncomplicated alcohol dependence ([facility] )    LYNN (generalized anxiety disorder)    Post-traumatic stress disorder, chronic    Medical clearance for psychiatric admission    Dyslipidemia    Carbuncle    Congestion of respiratory tract    Overactive bladder      Behavior over the last 24 hours:  Improved  Patient reports that she is feeling better on a Thorazine  She still paces a lot but she reports this is her habit  She has been sleeping better and she is agreeable with the discharge plan on Thursday to her act team    Sleep: normal  Appetite: normal  Medication side effects: No  ROS: no complaints    Mental Status Evaluation:  Appearance:  casually dressed   Behavior:  normal   Speech:  soft   Mood:  anxious   Affect:  constricted   Thought Process:  normal   Thought Content:  normal   Perceptual Disturbances: None   Risk Potential: Potential for Aggression No   Sensorium:  person and place   Cognition:  grossly intact   Consciousness:  awake    Attention: attention span appeared shorter than expected for age   Insight:  limited   Judgment: limited   Gait/Station: slow   Motor Activity: no abnormal movements     Progress Toward Goals: ongoing    Recommended Treatment: Continue with group therapy, milieu therapy and occupational therapy  Risks, benefits and possible side effects of Medications:   Risks, benefits, and possible side effects of medications explained to patient and patient verbalizes understanding  Medications: continue current psychiatric medications  Labs: reviewed    Counseling / Coordination of Care  Total floor / unit time spent today 15 minutes   Greater than 50% of total time was spent with the patient and / or family counseling and / or coordination of care  A description of the counseling / coordination of care:

## 2019-11-05 NOTE — PROGRESS NOTES
Patient has been in bed sleeping since the start of the shift  Aroused for hs meds and snack and juice given  Patient presents with flat affect, depressed mood  Reports that her head feels clearer and she feels better after getting a good sleep last night  No bizarre or delusional behaviors  Will continue to observe

## 2019-11-05 NOTE — PROGRESS NOTES
YUNIOR BASS, inventoried belongings brought in for pt with pt present  Pt was given a sweater from the belongings brought in  Pt also swapped out a shirt and pair of pants from pt room for a shirt and pair of pants that were brought in  Belongings brought in are as follows: In pt room:  Sweater x1    Belongings put in storage:  Bags of extra clothing and toiletries x3       The three bags of clothing also have the date written on them "11/5/19" to differentiate from pt's other bags of belongings

## 2019-11-05 NOTE — PROGRESS NOTES
11/05/19 0939   Team Meeting   Meeting Type Daily Rounds   Patient/Family Present   Patient Present No   Patient's Family Present No     Daily Psychiatric Rounds    Team Members Present:    MD Marques Avilez, JARED Morgan, MSW  Christie Goodpasture, LCSW  Magnolia Simpson, YUDY Vázquez, YUDY    Discussion:     ACT to come  patient on Thursday for possible placement at [facility] in [facility]  To confirm bed availability with CM before then   No roommate discontinued

## 2019-11-05 NOTE — PROGRESS NOTES
Pt remains on the unit, pt appears to be progressing during the day  Pt has been pleasant for the majority of the day  Pt did report how she wants to just go home  Pt denies any current si/hi at this time and reports that the hallucinations are not there  Will continue to monitor

## 2019-11-06 ENCOUNTER — TELEPHONE (OUTPATIENT)
Dept: PSYCHIATRY | Facility: CLINIC | Age: 49
End: 2019-11-06

## 2019-11-06 PROCEDURE — 99231 SBSQ HOSP IP/OBS SF/LOW 25: CPT | Performed by: NURSE PRACTITIONER

## 2019-11-06 RX ORDER — CHLORPROMAZINE HYDROCHLORIDE 50 MG/1
50 TABLET, FILM COATED ORAL 2 TIMES DAILY
Qty: 60 TABLET | Refills: 0 | Status: SHIPPED | OUTPATIENT
Start: 2019-11-06 | End: 2020-08-15 | Stop reason: ALTCHOICE

## 2019-11-06 RX ORDER — OXYBUTYNIN CHLORIDE 5 MG/1
5 TABLET, EXTENDED RELEASE ORAL DAILY
Qty: 30 TABLET | Refills: 0 | Status: SHIPPED | OUTPATIENT
Start: 2019-11-06 | End: 2021-07-29 | Stop reason: HOSPADM

## 2019-11-06 RX ORDER — CLONAZEPAM 0.5 MG/1
0.5 TABLET ORAL 3 TIMES DAILY
Qty: 30 TABLET | Refills: 0 | Status: SHIPPED | OUTPATIENT
Start: 2019-11-06 | End: 2021-07-29 | Stop reason: HOSPADM

## 2019-11-06 RX ORDER — LANOLIN ALCOHOL/MO/W.PET/CERES
100 CREAM (GRAM) TOPICAL DAILY
Qty: 30 TABLET | Refills: 0 | Status: ON HOLD | OUTPATIENT
Start: 2019-11-06 | End: 2021-07-27

## 2019-11-06 RX ORDER — CHLORPROMAZINE HYDROCHLORIDE 50 MG/1
150 TABLET, FILM COATED ORAL
Qty: 90 TABLET | Refills: 0 | Status: SHIPPED | OUTPATIENT
Start: 2019-11-06 | End: 2020-08-15 | Stop reason: ALTCHOICE

## 2019-11-06 RX ORDER — GABAPENTIN 300 MG/1
600 CAPSULE ORAL 4 TIMES DAILY
Qty: 240 CAPSULE | Refills: 0 | Status: SHIPPED | OUTPATIENT
Start: 2019-11-06 | End: 2020-08-26 | Stop reason: HOSPADM

## 2019-11-06 RX ADMIN — LORAZEPAM 0.5 MG: 0.5 TABLET ORAL at 02:11

## 2019-11-06 RX ADMIN — NICOTINE 1 PATCH: 14 PATCH, EXTENDED RELEASE TRANSDERMAL at 08:00

## 2019-11-06 RX ADMIN — GUAIFENESIN 600 MG: 600 TABLET, EXTENDED RELEASE ORAL at 07:49

## 2019-11-06 RX ADMIN — DIPHENHYDRAMINE HCL 50 MG: 25 TABLET ORAL at 02:14

## 2019-11-06 RX ADMIN — DIPHENHYDRAMINE HCL 50 MG: 25 TABLET ORAL at 19:42

## 2019-11-06 RX ADMIN — BACITRACIN ZINC, NEOMYCIN, POLYMYXIN B 1 LARGE APPLICATION: 400; 3.5; 5 OINTMENT TOPICAL at 08:20

## 2019-11-06 RX ADMIN — BACITRACIN ZINC, NEOMYCIN, POLYMYXIN B 1 LARGE APPLICATION: 400; 3.5; 5 OINTMENT TOPICAL at 17:53

## 2019-11-06 RX ADMIN — ZINC 1 TABLET: TAB ORAL at 07:50

## 2019-11-06 RX ADMIN — CHLORPROMAZINE HYDROCHLORIDE 50 MG: 25 TABLET, SUGAR COATED ORAL at 07:49

## 2019-11-06 RX ADMIN — CHLORPROMAZINE HYDROCHLORIDE 150 MG: 100 TABLET, SUGAR COATED ORAL at 21:08

## 2019-11-06 RX ADMIN — Medication 100 MG: at 07:49

## 2019-11-06 RX ADMIN — LIDOCAINE 1 PATCH: 50 PATCH TOPICAL at 07:54

## 2019-11-06 RX ADMIN — GABAPENTIN 600 MG: 300 CAPSULE ORAL at 17:49

## 2019-11-06 RX ADMIN — DIPHENHYDRAMINE HCL 50 MG: 25 TABLET ORAL at 12:59

## 2019-11-06 RX ADMIN — CLONAZEPAM 1 MG: 1 TABLET ORAL at 21:08

## 2019-11-06 RX ADMIN — OXYBUTYNIN CHLORIDE 5 MG: 5 TABLET, EXTENDED RELEASE ORAL at 12:08

## 2019-11-06 RX ADMIN — HYDROCORTISONE: 10 CREAM TOPICAL at 08:19

## 2019-11-06 RX ADMIN — BENZTROPINE MESYLATE 1 MG: 1 TABLET ORAL at 21:08

## 2019-11-06 RX ADMIN — CLONAZEPAM 1 MG: 1 TABLET ORAL at 12:08

## 2019-11-06 RX ADMIN — FLUTICASONE PROPIONATE 1 SPRAY: 50 SPRAY, METERED NASAL at 17:49

## 2019-11-06 RX ADMIN — LIDOCAINE 1 PATCH: 50 PATCH TOPICAL at 08:00

## 2019-11-06 RX ADMIN — CLONAZEPAM 1 MG: 1 TABLET ORAL at 07:49

## 2019-11-06 RX ADMIN — FLUTICASONE PROPIONATE 1 SPRAY: 50 SPRAY, METERED NASAL at 07:50

## 2019-11-06 RX ADMIN — ALUMINUM HYDROXIDE, MAGNESIUM HYDROXIDE, AND SIMETHICONE 30 ML: 200; 200; 20 SUSPENSION ORAL at 07:50

## 2019-11-06 RX ADMIN — PRAVASTATIN SODIUM 20 MG: 20 TABLET ORAL at 16:50

## 2019-11-06 RX ADMIN — GABAPENTIN 600 MG: 300 CAPSULE ORAL at 21:07

## 2019-11-06 RX ADMIN — GABAPENTIN 600 MG: 300 CAPSULE ORAL at 12:08

## 2019-11-06 RX ADMIN — CHLORPROMAZINE HYDROCHLORIDE 50 MG: 25 TABLET, SUGAR COATED ORAL at 12:08

## 2019-11-06 RX ADMIN — GABAPENTIN 600 MG: 300 CAPSULE ORAL at 07:49

## 2019-11-06 RX ADMIN — HYDROXYZINE HYDROCHLORIDE 50 MG: 25 TABLET ORAL at 02:11

## 2019-11-06 RX ADMIN — GUAIFENESIN 600 MG: 600 TABLET, EXTENDED RELEASE ORAL at 21:08

## 2019-11-06 NOTE — DISCHARGE INSTR - APPOINTMENTS
The treatment recommends that you continue to receive ongoing ACT services  Your [facility] Team providers will pick you up upon discharge from the hospital and transport you to The [place] [address] currently located at the [address] [place] [address], prior to which you will receive ACT therapeutic services, as needed at their [facility], [address], [place] [address]; Phone:  [phone]; Fax:  [phone]  You will continue to receive psychiatric medication management provided by Dr Sierra Moore whom you will see within 72 hours of your discharge  You will continue to receive case management services provided by Tyrone Shafer, and therapy services that will be provided to you in a timely manner  You declined that an appointment be scheduled in your behalf with your primary care physician  Please schedule an appointment as soon as possible with Dr Rogelio Carpenter at [facility], [address]  , [address], [facility], [address] [place]; Phone:  [phone]; Fax:  [phone]  Your summary of care will be faxed to this provider for continuity of care

## 2019-11-06 NOTE — PROGRESS NOTES
Pt slept for most of the evening and woke early  Pt appears agitated due to having a roommate  PRN ativan and benadryl administered for anxiety and agitation  Will continue to monitor

## 2019-11-06 NOTE — PLAN OF CARE
Problem: Nutrition/Hydration-ADULT  Goal: Nutrient/Hydration intake appropriate for improving, restoring or maintaining nutritional needs  Description  Monitor and assess patient's nutrition/hydration status for malnutrition  Collaborate with interdisciplinary team and initiate plan and interventions as ordered  Monitor patient's weight and dietary intake as ordered or per policy  Utilize nutrition screening tool and intervene as necessary  Determine patient's food preferences and provide high-protein, high-caloric foods as appropriate  INTERVENTIONS:  - Monitor oral intake, urinary output, labs, and treatment plans  - Assess nutrition and hydration status and recommend course of action  - Evaluate amount of meals eaten  - Assist patient with eating if necessary   - Allow adequate time for meals  - Recommend/ encourage appropriate diets, oral nutritional supplements, and vitamin/mineral supplements  - Order, calculate, and assess calorie counts as needed  - Recommend, monitor, and adjust tube feedings and TPN/PPN based on assessed needs  - Assess need for intravenous fluids  - Provide specific nutrition/hydration education as appropriate  - Include patient/family/caregiver in decisions related to nutrition  Outcome: Adequate for Discharge   Patient receiving regular diet, ensure clear ordered BID  She has been consuming 100% of her meals, most snacks and consuming her supplements  Her wt  was 118lb 11/2/19, increase of 7lbs/6 3% since admission, desirable  BMI is 22 30 normal  Patient intakes meeting estimated needs  She is for d/c 11/7/19, continue diet and supplements as ordered

## 2019-11-06 NOTE — DISCHARGE INSTR - OTHER ORDERS
You will discharge to The [address] that is a community outreach service for those who are in a season of need   Currently located at the [facility], [address] , [facility], [place] [address]; Phone:  [phone], the space provides a warm place to sleep from November 1st through March 31st  [address] is open from 10:00 pm to 6:00 am  seven nights a week for five consecutive months   Our guests are assigned to us through [facility], a community Day Center in [facility]  A wonderful group of volunteers that make this all happen  Obinna Flaming bring to the shelter a warm smile, meals to sustain the body, the word of God, and the hope of changing someones life through this experience  Crisis Plan:    Triggers you identified during your hospital stay that led to emotional instability / distress included:  Financial stressors; lack of family resources; and increased anxiety and depression  Coping skills you identified during your hospital stay include:  Music, walks, and exercise  After discharge, if you find your coping skills are not effective and you continue to feel distressed, please contact your [facility] Team providers  If that is not effective and you feel you are a danger to yourself or others, please contact [facility] Intervention: (675) 978-5632, [facility] Alcohol: (945) 288-5464, Peer Support Talk Line: Seven days a week, 1:00 PM  9:00 PM) Call: 138.858.8586 or Text: 225.944.7945, National Suicide Prevention Lifeline:  1-576.626.5476, National [place] on [address] [place] [address] ([facility]) HELPLINE: 946.243.8055/Email: www mariana  org, or Substance Abuse and [place] [address] [place] [address])  [employer], which is a confidential, free, 24-hour-a-day, 365-day-a-year, information service for individuals and family members facing mental health and/or substance use disorders   This service provides referrals to local treatment facilities, support groups, and community-based organizations  Callers can also order free publications and other information    Call 0-471.504.1275/MHIII: www [facility] gov

## 2019-11-06 NOTE — PROGRESS NOTES
Pt remains visible on the unit,pt remains calm and cooperative,  Pt denies any additional complaints at this time, pt is ready to go home  Will continue to monitor

## 2019-11-06 NOTE — PROGRESS NOTES
Pt has been withdrawn to her room since the beginning of the shift  Easily redirected when appeared with tank top outside her room  Behaviors calm and controlled  No issues with incontinence so far this shift  No roommate lifted so pt will be getting a roommate during the night  Will continue to monitor

## 2019-11-06 NOTE — TELEPHONE ENCOUNTER
[facility] called regarding refill for Chlorpromazine they received 2 different prescriptions for this patient and would like to confrim which script should be filled

## 2019-11-06 NOTE — PROGRESS NOTES
Progress Note - Behavioral Health     Tasha Sultana 51 y o  female MRN: [id number]   Unit/Bed#: [facility] 224-01 Encounter: [id number]    Behavior over the last 24 hours:      Giacomo Mulligan was seen for an inpatient follow-up psychiatric visit this date  At today's visit, she was calm, pleasant, and cooperative  She relates she is feeling much better since being admitted  She is no longer experiencing periods of mood lability  Her behavior is controlled and she has not had any verbal outbursts in the past 24 hours  Her appetite and sleep are within normal limits  She feels ready to be discharged  She feels the Invega injection and Thorazine are helping  She still reports occasional auditory hallucinations but relates they are tolerable  ROS: no complaints    Mental Status Evaluation:    Appearance:  casually dressed, dressed appropriately   Behavior:  pleasant, cooperative, calm   Speech:  normal rate and volume   Mood:  normal   Affect:  normal range and intensity   Thought Process:  organized, coherent   Associations: intact associations   Thought Content:  normal   Perceptual Disturbances: none   Risk Potential: Suicidal ideation - None  Homicidal ideation - None  Potential for aggression - No   Sensorium:  oriented to person, place and time/date   Memory:  recent and remote memory grossly intact   Consciousness:  alert and awake   Attention: poor concentration and poor attention span   Insight:  fair   Judgment: fair   Gait/Station: normal gait/station, normal balance   Motor Activity: no abnormal movements     Vital signs in last 24 hours:    Temp:  [98 1 °F (36 7 °C)] 98 1 °F (36 7 °C)  HR:  [77] 77  Resp:  [16] 16  BP: (113)/(75) 113/75    Laboratory results:  I have personally reviewed all pertinent laboratory/tests results      Progress Toward Goals: progressing    Assessment/Plan   Principal Problem:    Schizoaffective disorder, bipolar type ([facility] )  Active Problems:    Uncomplicated alcohol dependence (H[facility])    LYNN (generalized anxiety disorder)    Post-traumatic stress disorder, chronic    Medical clearance for psychiatric admission    Dyslipidemia    Carbuncle    Congestion of respiratory tract    Overactive bladder    Recommended Treatment:     Continue current medications as prescribed  Continue to monitor  Plan is for discharge tomorrow pending any change in patient's status      All current active medications have been reviewed  Encourage group therapy, milieu therapy and occupational therapy  Behavioral Health checks every 7 minutes      Current Facility-Administered Medications:  acetaminophen 650 mg Oral Q6H PRN Isis N Lodics, CRNP   acetaminophen 650 mg Oral Q4H PRN Isis N Lodics, CRNP   acetaminophen 975 mg Oral Q6H PRN Isis N Lodics, CRNP   aluminum-magnesium hydroxide-simethicone 30 mL Oral Q4H PRN [place], MD   benztropine 1 mg Intramuscular Q6H PRN [place], MD   benztropine 1 mg Oral Q6H PRN [place], MD   chlorproMAZINE 150 mg Oral HS Jeanie Singer MD   chlorproMAZINE 50 mg Oral BID Isis NAGEL Lodics, CRNP   clonazePAM 1 mg Oral TID Isis Porterics, CRNP   diphenhydrAMINE 50 mg Intramuscular Q6H PRN Isis N Lodics, CRNP   diphenhydrAMINE 50 mg Oral Q6H PRN Isis NAGEL Lodics, CRNP   fluticasone 1 spray Each Nare BID Leticia Peng, CRNP   gabapentin 600 mg Oral 4x Daily Jeanie Singer MD   guaiFENesin 600 mg Oral Q12H [facility] Leticia Limerick, CRNP   hydrocortisone  Topical 4x Daily PRN Isis N Lodics, CRNP   hydrOXYzine HCL 50 mg Oral Q4H PRN Isis NAGEL Lodics, CRNP   lidocaine 1 patch Topical Daily Kam Diaz PA-C   LORazepam 2 mg Intramuscular Q4H PRN Isis NAGEL Lodics, CRNP   LORazepam 0 5 mg Oral Q4H PRN Jocy Foots, CRNP   magnesium hydroxide 30 mL Oral Daily PRN Anthony Harris MD   multivitamin stress formula 1 tablet Oral Daily Kam Diaz PA-C   neomycin-bacitracin-polymyxin b 1 large application Topical BID Ana Solitario MD   nicotine 1 patch Transdermal Daily JARED Jones   OLANZapine 10 mg Intramuscular Q3H PRN JARED Jones   oxybutynin 5 mg Oral Daily Malachi Doss PA-C   pravastatin 20 mg Oral Daily With Bishop Antoni MD   risperiDONE 1 mg Oral Q6H PRN Ric Lizama MD   thiamine 100 mg Oral Daily Malachi Doss PA-C   traZODone 50 mg Oral HS PRN Ric Lizama MD       Risks / Benefits of Treatment:    Risks, benefits, and possible side effects of medications explained to patient and patient verbalizes understanding and agreement for treatment  Counseling / Coordination of Care:      Patient's progress discussed with staff in treatment team meeting  Medications, treatment progress and treatment plan reviewed with patient

## 2019-11-06 NOTE — PROGRESS NOTES
Team Members Present:   MD Eleno Montoya, JARED Rust, RN  [facility], [address] [place] [address]  Donya Macdonald, PharmD    DC on Thursday to [facility] via ACT

## 2019-11-07 VITALS
OXYGEN SATURATION: 97 % | DIASTOLIC BLOOD PRESSURE: 74 MMHG | HEIGHT: 61 IN | BODY MASS INDEX: 22.28 KG/M2 | SYSTOLIC BLOOD PRESSURE: 123 MMHG | TEMPERATURE: 98.7 F | HEART RATE: 76 BPM | WEIGHT: 118 LBS | RESPIRATION RATE: 18 BRPM

## 2019-11-07 PROCEDURE — 99238 HOSP IP/OBS DSCHRG MGMT 30/<: CPT | Performed by: NURSE PRACTITIONER

## 2019-11-07 RX ORDER — BENZTROPINE MESYLATE 1 MG/1
1 TABLET ORAL EVERY 6 HOURS PRN
Qty: 30 TABLET | Refills: 0 | Status: SHIPPED | OUTPATIENT
Start: 2019-11-07 | End: 2021-07-29 | Stop reason: HOSPADM

## 2019-11-07 RX ADMIN — ZINC 1 TABLET: TAB ORAL at 08:17

## 2019-11-07 RX ADMIN — HYDROCORTISONE: 10 CREAM TOPICAL at 08:16

## 2019-11-07 RX ADMIN — CLONAZEPAM 1 MG: 1 TABLET ORAL at 11:03

## 2019-11-07 RX ADMIN — GABAPENTIN 600 MG: 300 CAPSULE ORAL at 08:16

## 2019-11-07 RX ADMIN — BACITRACIN ZINC, NEOMYCIN, POLYMYXIN B 1 LARGE APPLICATION: 400; 3.5; 5 OINTMENT TOPICAL at 08:16

## 2019-11-07 RX ADMIN — Medication 100 MG: at 08:17

## 2019-11-07 RX ADMIN — FLUTICASONE PROPIONATE 1 SPRAY: 50 SPRAY, METERED NASAL at 09:24

## 2019-11-07 RX ADMIN — LIDOCAINE 1 PATCH: 50 PATCH TOPICAL at 08:15

## 2019-11-07 RX ADMIN — CHLORPROMAZINE HYDROCHLORIDE 50 MG: 25 TABLET, SUGAR COATED ORAL at 08:17

## 2019-11-07 RX ADMIN — OXYBUTYNIN CHLORIDE 5 MG: 5 TABLET, EXTENDED RELEASE ORAL at 08:17

## 2019-11-07 RX ADMIN — BENZTROPINE MESYLATE 1 MG: 1 TABLET ORAL at 09:20

## 2019-11-07 RX ADMIN — NICOTINE 1 PATCH: 14 PATCH, EXTENDED RELEASE TRANSDERMAL at 08:16

## 2019-11-07 RX ADMIN — CHLORPROMAZINE HYDROCHLORIDE 50 MG: 25 TABLET, SUGAR COATED ORAL at 11:03

## 2019-11-07 RX ADMIN — GUAIFENESIN 600 MG: 600 TABLET, EXTENDED RELEASE ORAL at 08:17

## 2019-11-07 RX ADMIN — GABAPENTIN 600 MG: 300 CAPSULE ORAL at 11:03

## 2019-11-07 RX ADMIN — CLONAZEPAM 1 MG: 1 TABLET ORAL at 08:16

## 2019-11-07 NOTE — DISCHARGE SUMMARY
[facility] 51 [facility] MRN: [id number]  Unit/Bed#: [facility] 224-01 Encounter: [id number]     Admission Date: 10/22/2019         Discharge Date: 11/7/2019 11:13 AM    Attending Psychiatrist: No att  providers found    Reason for Admission/HPI:     Principal Problem:    Schizoaffective disorder, bipolar type ([facility] )  Active Problems:    Uncomplicated alcohol dependence ([facility] )    LYNN (generalized anxiety disorder)    Post-traumatic stress disorder, chronic    Medical clearance for psychiatric admission    Dyslipidemia    Carbuncle    Congestion of respiratory tract    Overactive bladder      Blu Tolentino is a 36-year-old female patient admitted to the [address] [place] [address] unit on a voluntary 201 commitment basis due to mood lability, erratic behavior, and symptoms of psychosis  Per initial emergency room evaluation completed by Dr Supa Gardner:    "Patient is a 36-year-old female with medical history significant for polysubstance abuse, paranoid schizophrenia/schizoaffective disorder, PTSD, presents to the emergency department for psychiatric evaluation  Patient reports she is currently at [address] [place] [address] and she has been there for about a week and has been having these outbursts and "psychotic episodes "  Patient reports she feels like she is going insane "  When asked what she means when she states she is going insane she cannot state what happens during these episodes other than her bursting out yelling and screaming  Her  is also here today and nose patient very well for many years and reports her mood and emotional status has been very labile as well as her behavior  It is starting to disrupt other residents at [address] [place] [address]  Patient denies any auditory visual hallucinations but reports she cannot get a  on her outbursts  She denies any recent medication change    She did miss her last appointment with her psychiatrist last month due to moving but reports being compliant with medication  She denies feeling significantly depressed and denies any SI, HI  She has no medical complaints at this time and review of systems is negative "    Per initial psychiatric evaluation completed by Dr Jose Luis Montana:    "Patient is a 51 y o  female with long history of schizoaffective disorder and substance use who reported ports thing of depression and having auditory hallucinations telling her to hurt herself so she came to the emergency room  Patient reports that she does not have stable housing and has been staying in a crisis residence for the past few days  Patient reports that she has been in between places that she needs to be in a group home because she cannot be stable on the street  Patient admits of using meth amphetamines but that she only used it 1 time despite her UDS being positive multiple times in the past few months  Patient reports that she has been compliant with her medications but her UDS was negative for benzodiazepines and she was prescribed Ativan consistently for the past few months "    Flaquito Ying has a past psychiatric history of schizoaffective disorder, posttraumatic stress disorder, alcohol dependence, and substance abuse  She has been hospitalized several times on an inpatient basis and is well known to this facility  She currently receives outpatient psychiatric care from Dr Mehreen Calero  Her care is overseen by the [address] Course:     Flaquito Ying was [facility] the [address] unit after being medically cleared  Once on the unit, she was seen by medical doctor for physical examination  She was placed on 7 minutes behavioral health checks for patient safety  She was encouraged to attend both group and occupational therapy  Psychiatric medications were initiated and titrated to appropriate dosages  Before any medications were administered, risk versus benefit was discussed   Flaquito Ying agreed to take medication as prescribed and participate in psychiatric treatment  Initially after admission, she was labile, irritable, and was having frequent outbursts of screaming  She was endorsing auditory hallucinations which she stated were making her feel like I am losing my mind  Ativan and Risperdal were initiated and were somewhat effective  Because Risperdal had been effective in the past and Lianna Callahan frequently experiences compliance issues due to transience including frequent changes in housing, it was decided to begin Earlie Cordia monthly injections  She received her 1st injection of Invega on 10/28 which was 234 mg  She received her 2nd injectable dose of Invega on 11/01 which was 156 mg  She was bridged with p o  Invega while in the hospital   She was still experiencing debilitating auditory hallucinations so Thorazine was added to her medication regimen  She was eventually titrated to 50 mg twice daily as well as 150 mg at bedtime  Gabapentin was also increased to 600 mg 4 times daily to reduce anxiety  Clonazepam was given 1 mg t i d  During hospital admission and reduce to 0 5 mg t i d  Upon discharge  This ideally should be weaned as she continues to improve  Once she began this medication regimen and became adjusted to the therapeutic milieu of the unit, her psychotic symptoms decreased dramatically  Her mood improved and her appetite and sleep returned to normal   She was able to function without feeling as if she were out of control  She continued to improve throughout the course of her hospital stay and by the end of her time on the unit, she felt ready to be discharged  Because she was doing so much better, the Psychiatric Care Team felt it would be both safe and appropriate to discharge her to care on an outpatient basis    She will continue to follow up with her current psychiatrist as well as the [facility] ACT team   Follow-up appointments were scheduled on her behalf by the unit case manager  On the day of discharge, Khari Liriano denied any suicidal or homicidal ideation as well as any auditory or visual hallucinations  Her mood and behavior were stable  Her appetite and sleep are within normal limits  She was taking her medications without side effects  She obtained housing through the [facility] and was looking forward to being discharged  Mental Status at time of Discharge:     Appearance:  casually dressed   Behavior:  normal   Speech:  normal pitch and normal volume   Mood:  normal   Affect:  normal   Thought Process:  normal   Thought Content:  normal   Perceptual Disturbances: None   Risk Potential: Patient denies any suicidal or homicidal ideations  Sensorium:  person, place, time/date and situation   Cognition:  grossly intact   Consciousness:  alert and awake    Attention: attention span and concentration were age appropriate   Insight:  fair   Judgment: fair   Gait/Station: normal gait/station and normal balance   Motor Activity: no abnormal movements     Admission Diagnosis:Depression [F32 9]    Discharge Diagnosis:   Principal Problem:    Schizoaffective disorder, bipolar type ([facility] )  Active Problems:    Uncomplicated [facility] dependence ([facility] )    LYNN (generalized anxiety disorder)    Post-traumatic stress disorder, chronic    Medical clearance for psychiatric admission    Dyslipidemia    Carbuncle    Congestion of respiratory tract    Overactive bladder  Resolved Problems:    * No resolved hospital problems   *        Lab results:  Admission on 10/22/2019, Discharged on 11/07/2019   Component Date Value    WBC 10/24/2019 8 40     RBC 10/24/2019 4 39     Hemoglobin 10/24/2019 14 8     Hematocrit 10/24/2019 44 3     MCV 10/24/2019 101*    MCH 10/24/2019 33 8     MCHC 10/24/2019 33 4     RDW 10/24/2019 13 6     MPV 10/24/2019 7 9*    Platelets 26/80/0952 321     Neutrophils Relative 10/24/2019 67     Lymphocytes Relative 10/24/2019 21     Monocytes Relative 10/24/2019 8     Eosinophils Relative 10/24/2019 4     Basophils Relative 10/24/2019 0     Neutrophils Absolute 10/24/2019 5 60     Lymphocytes Absolute 10/24/2019 1 80     Monocytes Absolute 10/24/2019 0 70     Eosinophils Absolute 10/24/2019 0 30     Basophils Absolute 10/24/2019 0 00     Sodium 10/24/2019 134     Potassium 10/24/2019 4 4     Chloride 10/24/2019 101     CO2 10/24/2019 26     ANION GAP 10/24/2019 7     BUN 10/24/2019 7     Creatinine 10/24/2019 0 64     Glucose 10/24/2019 132*    Calcium 10/24/2019 9 4     AST 10/24/2019 15     ALT 10/24/2019 13     Alkaline Phosphatase 10/24/2019 63     Total Protein 10/24/2019 7 0     Albumin 10/24/2019 4 1     Total Bilirubin 10/24/2019 0 20     eGFR 10/24/2019 106     Cholesterol 10/24/2019 203*    Triglycerides 10/24/2019 270*    HDL, Direct 10/24/2019 53     LDL Calculated 10/24/2019 96     Non-HDL-Chol (CHOL-HDL) 10/24/2019 150     TSH 3RD GENERATON 10/24/2019 2 190        Discharge Medications:  Discharge Medication List as of 11/7/2019 10:44 AM      START taking these medications    Details   benztropine (COGENTIN) 1 mg tablet Take 1 tablet (1 mg total) by mouth every 6 (six) hours as needed for tremors (mild extrapyramidal symptoms), Starting Thu 11/7/2019, Until Sat 12/7/2019, Normal      !! chlorproMAZINE (THORAZINE) 50 mg tablet Take 3 tablets (150 mg total) by mouth daily at bedtime, Starting Wed 11/6/2019, Until Fri 12/6/2019, Normal      !! chlorproMAZINE (THORAZINE) 50 mg tablet Take 1 tablet (50 mg total) by mouth 2 (two) times a day, Starting Wed 11/6/2019, Until Fri 12/6/2019, Normal      clonazePAM (KlonoPIN) 0 5 mg tablet Take 1 tablet (0 5 mg total) by mouth 3 (three) times a day for 10 days, Starting Wed 11/6/2019, Until Sat 11/16/2019, Normal      oxybutynin (DITROPAN-XL) 5 mg 24 hr tablet Take 1 tablet (5 mg total) by mouth daily, Starting Wed 11/6/2019, Until Fri 12/6/2019, Normal      thiamine 100 MG tablet Take 1 tablet (100 mg total) by mouth daily, Starting Wed 11/6/2019, Until Fri 12/6/2019, Normal       !! - Potential duplicate medications found  Please discuss with provider  Discharge Medication List as of 11/7/2019 10:44 AM      STOP taking these medications       LORazepam (ATIVAN) 0 5 mg tablet Comments:   Reason for Stopping:         risperiDONE (RisperDAL) 1 mg tablet Comments:   Reason for Stopping:         risperiDONE (RisperDAL) 3 mg tablet Comments:   Reason for Stopping:              Discharge Medication List as of 11/7/2019 10:44 AM      CONTINUE these medications which have CHANGED    Details   gabapentin (NEURONTIN) 300 mg capsule Take 2 capsules (600 mg total) by mouth 4 (four) times a day, Starting Wed 11/6/2019, Until Fri 12/6/2019, Normal            Discharge Medication List as of 11/7/2019 10:44 AM      CONTINUE these medications which have NOT CHANGED    Details   simvastatin (ZOCOR) 20 mg tablet Take 1 tablet (20 mg total) by mouth daily at bedtime for 90 days, Starting Mon 8/19/2019, Until Sun 11/17/2019, Normal              Discharge instructions/Information to patient and family:   See after visit summary for information provided to patient and family  Provisions for Follow-Up Care:  See after visit summary for information related to follow-up care and any pertinent home health orders  Discharge Statement   I spent 40 minutes discharging the patient  This time was spent on the day of discharge  I had direct contact with the patient on the day of discharge  Additional documentation is required if more than 30 minutes were spent on discharge

## 2019-11-07 NOTE — DISCHARGE INSTR - LAB
Contact Information: If you have any questions, concerns, pended studies, tests and/or procedures, or emergencies regarding your inpatient behavioral health visit  Please contact [facility] [phone] and ask to speak to a , nurse or physician  A contact is available 24 hours/ 7 days a week at this number  Summary of Procedures Performed During your Stay:  Below is a list of major procedures performed during your hospital stay and a summary of results:  - No major procedures performed  Pending Studies (From admission, onward)    None        If studies are pending at discharge, follow up with your PCP and/or referring provider

## 2019-11-07 NOTE — BH TRANSITION RECORD
Contact Information: If you have any questions, concerns, pended studies, tests and/or procedures, or emergencies regarding your inpatient behavioral health visit  Please contact [facility] [phone] and ask to speak to a , nurse or physician  A contact is available 24 hours/ 7 days a week at this number  Summary of Procedures Performed During your Stay:  Below is a list of major procedures performed during your hospital stay and a summary of results:  - No major procedures performed  Pending Studies (From admission, onward)    None        If studies are pending at discharge, follow up with your PCP and/or referring provider

## 2019-11-07 NOTE — PROGRESS NOTES
PT got an item brought onto the unit and was given to wear around out on the floor, this item is    Long tan sweater x 1

## 2019-11-07 NOTE — PROGRESS NOTES
Pt  Will be d/c with own belongings upon d/cfrom the unit  Hat,shoes,coats x2,orange bag,pink bag, black bag,   Sweater x1, underwear x13, tank tops x4, t-shirts x2, long sleeve shirts x2, pants x3, pj bottoms x2, grey poe shirt x1, dress x1, toiletries and 3 additional bags of clothing that were brought in by pts  Mother for when pt  Discharges  Cell phone and cell phone     Cigarette machine,filters,tobacco,and container  PA ID, [place] EBT, Master Card, [employer] card,one scratch off lottery ticket, $[id number] [phone]       Pt  Has NO other valuables

## 2019-11-07 NOTE — PROGRESS NOTES
Pt mostly withdrawn to her room this evening  Looking forward to discharge tomorrow  Compliant with medications  Able to make needs known  Will continue to monitor

## 2019-11-07 NOTE — PROGRESS NOTES
11/07/19 0935   Team Meeting   Meeting Type Daily Rounds   Initial Conference Date 11/07/19   Patient/Family Present   Patient Present No   Patient's Family Present No     Daily Rounds Documentation    Team Members Present:   MD Philip Wills, MARK ANTHONY Zamora, [place]    Discharge today at [facility]  a script for PRN Cogentin

## 2019-12-02 NOTE — ESCALATED TEAM TX
Treatment team meeting held today  Pt is a new admit; pt was intoxicated in the ED and having SI to shoot self but does report that she has not guns  Pt has been on the unit several times in the past and appears more controlled and pleasant compared to past stays  Pt rating depression and anxiety 8 out of 10  Pt has hx of ACT services; SW will find out if they are still involved  Medications will be adjusted and pt will likely discharge early next week      LCD: 10/26/18  Disposition: Home;early next week
Yes

## 2020-02-20 ENCOUNTER — OFFICE VISIT (OUTPATIENT)
Dept: FAMILY MEDICINE CLINIC | Facility: CLINIC | Age: 50
End: 2020-02-20
Payer: COMMERCIAL

## 2020-02-20 VITALS — OXYGEN SATURATION: 96 % | HEIGHT: 60 IN | WEIGHT: 112 LBS | BODY MASS INDEX: 21.99 KG/M2 | HEART RATE: 111 BPM

## 2020-02-20 DIAGNOSIS — R10.84 GENERALIZED ABDOMINAL PAIN: ICD-10-CM

## 2020-02-20 DIAGNOSIS — F25.0 SCHIZOAFFECTIVE DISORDER, BIPOLAR TYPE (HCC): Chronic | ICD-10-CM

## 2020-02-20 DIAGNOSIS — K59.01 SLOW TRANSIT CONSTIPATION: Primary | ICD-10-CM

## 2020-02-20 DIAGNOSIS — J98.8 CONGESTION OF RESPIRATORY TRACT: ICD-10-CM

## 2020-02-20 PROCEDURE — 99213 OFFICE O/P EST LOW 20 MIN: CPT | Performed by: FAMILY MEDICINE

## 2020-02-20 PROCEDURE — 3008F BODY MASS INDEX DOCD: CPT | Performed by: FAMILY MEDICINE

## 2020-02-20 NOTE — ASSESSMENT & PLAN NOTE
Schizoaffective disorder bipolar type on multiple medications contributing to her problems and issues some of the side effects or creating her slow transit constipation and I made her aware of that  At this point I would like to add MiraLax so we do not have an interaction with other medications and remains in her digestive tract she agreed to it and will start this and find out what her x-rays show me and go from there

## 2020-02-20 NOTE — ASSESSMENT & PLAN NOTE
Upper respiratory congestion continue with over-the-counter products at this time if symptoms worsen she will contact me at this time I reassured her that this is viral she came in with a caretaker who helps her to get to different appointments at this point both understood the instructions today

## 2020-02-20 NOTE — ASSESSMENT & PLAN NOTE
Slow transit constipation recommend MiraLax at this time start off once daily then add twice daily additionally she will need an obstruction series on her bowels to better assess what is going on with the abdominal pain now as she has multiple complaints about this  It is difficult to assess as her history can move from place to place due to her schizophrenia she feels that she has significant problems is requesting various testing for areas of muscular pain in her lower abdomen and then indigestion in and other symptoms etcetera

## 2020-02-20 NOTE — PROGRESS NOTES
Assessment/Plan:       Problem List Items Addressed This Visit        Digestive    Slow transit constipation - Primary      Slow transit constipation recommend MiraLax at this time start off once daily then add twice daily additionally she will need an obstruction series on her bowels to better assess what is going on with the abdominal pain now as she has multiple complaints about this  It is difficult to assess as her history can move from place to place due to her schizophrenia she feels that she has significant problems is requesting various testing for areas of muscular pain in her lower abdomen and then indigestion in and other symptoms etcetera  Relevant Orders    XR abdomen obstruction series       Respiratory    Congestion of respiratory tract      Upper respiratory congestion continue with over-the-counter products at this time if symptoms worsen she will contact me at this time I reassured her that this is viral she came in with a caretaker who helps her to get to different appointments at this point both understood the instructions today            Other    Schizoaffective disorder, bipolar type ([facility] ) (Chronic)      Schizoaffective disorder bipolar type on multiple medications contributing to her problems and issues some of the side effects or creating her slow transit constipation and I made her aware of that  At this point I would like to add MiraLax so we do not have an interaction with other medications and remains in her digestive tract she agreed to it and will start this and find out what her x-rays show me and go from there  Generalized abdominal pain    Relevant Orders    XR abdomen obstruction series            Subjective:      Patient ID: Martell Quintanilla is a 51 y o  female  Patient presents to the office today for constipation she has not had a bowel movement in close to a week now she has have an abdominal pain generalized also cold symptoms with a cough        The following portions of the patient's history were reviewed and updated as appropriate: allergies, current medications, past family history, past medical history, past social history, past surgical history and problem list     Review of Systems   Constitutional: Negative for chills, fatigue and fever  HENT: Positive for congestion, postnasal drip, rhinorrhea and sore throat  Negative for nosebleeds and sinus pressure  Eyes: Negative for discharge and redness  Respiratory: Positive for cough and shortness of breath  Cardiovascular: Negative for chest pain, palpitations and leg swelling  Gastrointestinal: Positive for abdominal distention, abdominal pain, constipation and nausea  Negative for blood in stool  Endocrine: Negative for cold intolerance, heat intolerance and polyuria  Genitourinary: Negative for dysuria and frequency  Musculoskeletal: Negative for arthralgias, back pain and myalgias  Skin: Negative for rash  Neurological: Negative for dizziness, weakness and headaches  Hematological: Negative for adenopathy  Psychiatric/Behavioral: Negative for behavioral problems and sleep disturbance  The patient is not nervous/anxious  Objective:      Pulse (!) 111   Ht 5' (1 524 m)   Wt 50 8 kg (112 lb)   LMP 10/24/2018   SpO2 96%   BMI 21 87 kg/m²        Physical Exam   Constitutional: She is oriented to person, place, and time  She appears well-developed and well-nourished  No distress  HENT:   Head: Normocephalic and atraumatic  Right Ear: External ear normal    Left Ear: External ear normal    Nose: Nose normal    Mouth/Throat: Oropharynx is clear and moist  No oropharyngeal exudate  Eyes: Pupils are equal, round, and reactive to light  Conjunctivae and EOM are normal  Right eye exhibits no discharge  Left eye exhibits no discharge  No scleral icterus  Neck: Normal range of motion  No JVD present  No thyromegaly present     Cardiovascular: Normal rate, regular rhythm and normal heart sounds  No murmur heard  Pulmonary/Chest: Effort normal  She has no wheezes  She has no rales  She exhibits no tenderness  Abdominal: Soft  Bowel sounds are normal  She exhibits no distension and no mass  There is no tenderness  Musculoskeletal: Normal range of motion  She exhibits no edema, tenderness or deformity  Lymphadenopathy:     She has no cervical adenopathy  Neurological: She is alert and oriented to person, place, and time  She has normal reflexes  She displays normal reflexes  No cranial nerve deficit  Coordination normal    Skin: Skin is warm and dry  No rash noted  Psychiatric: She has a normal mood and affect  Her behavior is normal  Judgment and thought content normal    Nursing note and vitals reviewed  Data:    Laboratory Results: I have personally reviewed the pertinent laboratory results/reports   Radiology/Other Diagnostic Testing Results: I have personally reviewed pertinent reports         Lab Results   Component Value Date    WBC 8 40 10/24/2019    HGB 14 8 10/24/2019    HCT 44 3 10/24/2019     (H) 10/24/2019     10/24/2019     Lab Results   Component Value Date     06/22/2018    K 4 4 10/24/2019     10/24/2019    CO2 26 10/24/2019    ANIONGAP 14 4 06/22/2018    BUN 7 10/24/2019    CREATININE 0 64 10/24/2019    GLUCOSE 74 08/20/2015    GLUF 114 (H) 08/06/2019    CALCIUM 9 4 10/24/2019    AST 15 10/24/2019    ALT 13 10/24/2019    ALKPHOS 63 10/24/2019    PROT 6 8 06/22/2018    BILITOT 0 2 (L) 06/22/2018    EGFR 106 10/24/2019     Lab Results   Component Value Date    CHOLESTEROL 203 (H) 10/24/2019    CHOLESTEROL 269 (H) 08/06/2019    CHOLESTEROL 229 (H) 01/31/2019     Lab Results   Component Value Date    HDL 53 10/24/2019     (H) 08/06/2019    HDL 56 01/31/2019     Lab Results   Component Value Date    LDLCALC 96 10/24/2019    LDLCALC 80 08/06/2019    [address]  01/31/2019      Comment:      Calculated LDL invalid, triglycerides >400 mg/dl  This screening LDL is a calculated result  It does not have the accuracy of the Direct Measured LDL in the monitoring of patients with hyperlipidemia and/or statin therapy  Direct Measure LDL (HRF186) must be ordered separately in these patients       Lab Results   Component Value Date    TRIG 270 (H) 10/24/2019    TRIG 292 (H) 08/06/2019    TRIG 430 (H) 01/31/2019     No results found for: [place], [place]  Lab Results   Component Value Date    LRB6MTBFMQYG 2 190 10/24/2019     Lab Results   Component Value Date    HGBA1C 5 0 08/06/2019     No results found for: MARIE Uriostegui,

## 2020-02-20 NOTE — PATIENT INSTRUCTIONS
Irritable Bowel Syndrome   AMBULATORY CARE:   Irritable bowel syndrome (IBS)  is a condition that prevents food from moving through your intestines normally  The food may move through too slowly or too quickly  This causes bloating, increased gas, constipation, or diarrhea  Signs and symptoms of IBS may come and go  Symptoms can occur a few times a week to once a month  IBS can go away for years and suddenly return  Your symptoms may worsen after you eat a big meal or if you do not eat enough healthy foods  Common symptoms include the following:   · Abdominal pain that disappears after you have a bowel movement    · Abdominal cramps that are worse after you eat    · Gas    · Bloated abdomen    · Diarrhea, constipation, or both     · Feeling like you need to have a bowel movement after you just had one    · Mucus in your bowel movement    · Feeling that you have not completely emptied your bowels after a bowel movement  Seek care immediately if:   · You have severe abdominal pain  · Your bowel movements are dark or have blood in them  Contact your healthcare provider if:   · You have a fever  · You have pain in your rectum  · Your abdominal pain does not go away, even after treatment  · You have questions or concerns about your condition or care  Treatment for IBS  may include medicine to decrease diarrhea or soften your bowel movements  You may also need medicine to treat constipation or decrease abdominal pain and muscle spasms  Manage your symptoms:   · Eat a variety of healthy foods  Healthy foods include fruits, vegetables, whole-grain breads, low-fat dairy products, beans, lean meats, and fish  You may need to avoid certain foods to decrease your symptoms  · Drink liquids as directed  Ask how much liquid to drink each day and which liquids are best for you  For most people, good liquids to drink are water, juice, and milk  · Exercise regularly  Ask about the best exercise plan for you  Exercise can decrease your blood pressure and improve your health  · Manage stress  Stress may slow healing and cause illness  Learn new ways to relax, such as deep breathing  · Keep a record  of everything you eat and drink, and your symptoms, for 3 weeks  Bring this record with you to your follow-up visits  Follow up with your healthcare provider as directed:  Write down your questions so you remember to ask them during your visits  © 2017 [address] [employer] Information is for End User's use only and may not be sold, redistributed or otherwise used for commercial purposes  All illustrations and images included in CareNotes® are the copyrighted property of A D [employer] M [employer]  or [employer] Mota  The above information is an  only  It is not intended as medical advice for individual conditions or treatments  Talk to your doctor, nurse or pharmacist before following any medical regimen to see if it is safe and effective for you

## 2020-02-21 ENCOUNTER — HOSPITAL ENCOUNTER (OUTPATIENT)
Dept: RADIOLOGY | Facility: HOSPITAL | Age: 50
Discharge: HOME | End: 2020-02-21
Payer: COMMERCIAL

## 2020-02-21 DIAGNOSIS — K59.01 SLOW TRANSIT CONSTIPATION: ICD-10-CM

## 2020-02-21 DIAGNOSIS — R10.84 GENERALIZED ABDOMINAL PAIN: ICD-10-CM

## 2020-02-21 PROCEDURE — 74022 RADEX COMPL AQT ABD SERIES: CPT

## 2020-02-27 ENCOUNTER — TELEPHONE (OUTPATIENT)
Dept: FAMILY MEDICINE CLINIC | Facility: CLINIC | Age: 50
End: 2020-02-27

## 2020-02-27 DIAGNOSIS — R91.1 SOLID NODULE OF LUNG GREATER THAN 8 MM IN DIAMETER: Primary | ICD-10-CM

## 2020-02-27 NOTE — TELEPHONE ENCOUNTER
Information reviewed a note has been sent out to clinical department here to notify this patient  She requires a caretaker for all transportation to medical appointments in light of her schizophrenia and other mental health issues and the information needs to be provided to her and her caretaker so she understands

## 2020-03-02 ENCOUNTER — TELEPHONE (OUTPATIENT)
Dept: FAMILY MEDICINE CLINIC | Facility: CLINIC | Age: 50
End: 2020-03-02

## 2020-03-05 ENCOUNTER — HOSPITAL ENCOUNTER (OUTPATIENT)
Dept: CT IMAGING | Facility: HOSPITAL | Age: 50
Discharge: HOME | End: 2020-03-05
Payer: COMMERCIAL

## 2020-03-05 DIAGNOSIS — R91.1 SOLID NODULE OF LUNG GREATER THAN 8 MM IN DIAMETER: ICD-10-CM

## 2020-03-05 PROCEDURE — 71250 CT THORAX DX C-: CPT

## 2020-03-06 ENCOUNTER — TELEPHONE (OUTPATIENT)
Dept: FAMILY MEDICINE CLINIC | Facility: CLINIC | Age: 50
End: 2020-03-06

## 2020-03-10 ENCOUNTER — TELEPHONE (OUTPATIENT)
Dept: FAMILY MEDICINE CLINIC | Facility: CLINIC | Age: 50
End: 2020-03-10

## 2020-03-10 DIAGNOSIS — J01.00 ACUTE NON-RECURRENT MAXILLARY SINUSITIS: Primary | ICD-10-CM

## 2020-03-10 RX ORDER — CEPHALEXIN 500 MG/1
1000 CAPSULE ORAL EVERY 12 HOURS SCHEDULED
Qty: 28 CAPSULE | Refills: 0 | Status: SHIPPED | OUTPATIENT
Start: 2020-03-10 | End: 2020-03-17

## 2020-03-10 NOTE — TELEPHONE ENCOUNTER
There was another message on this patient notify the patient that I sent in an antibiotic high previously wrote 1 regarding her condition but saw this message there after and sent in for an antibiotic if she is not improving she should come back in for appointment

## 2020-03-23 ENCOUNTER — TELEPHONE (OUTPATIENT)
Dept: FAMILY MEDICINE CLINIC | Facility: CLINIC | Age: 50
End: 2020-03-23

## 2020-03-23 NOTE — TELEPHONE ENCOUNTER
Contact this patient and set her up for a visit tomorrow by phone call to discuss this in more detail she has a significant psychiatric history and this will require a conversation before prescribing medication in light of her multiple medications    She may need a referral over to her psychiatrist but I would need to speak to her about these things

## 2020-03-23 NOTE — TELEPHONE ENCOUNTER
She is having acute withdrawal from alcohol, numbness in arm, has had convulsion seizures and chest pain  Wants a prescription for withdrawal symptoms

## 2020-03-24 ENCOUNTER — TELEPHONE (OUTPATIENT)
Dept: FAMILY MEDICINE CLINIC | Facility: CLINIC | Age: 50
End: 2020-03-24

## 2020-03-24 PROBLEM — F10.29 ALCOHOL DEPENDENCE WITH UNSPECIFIED ALCOHOL-INDUCED DISORDER (HCC): Status: ACTIVE | Noted: 2020-03-24

## 2020-03-30 ENCOUNTER — TELEPHONE (OUTPATIENT)
Dept: FAMILY MEDICINE CLINIC | Facility: CLINIC | Age: 50
End: 2020-03-30

## 2020-03-30 DIAGNOSIS — J20.8 ACUTE BACTERIAL BRONCHITIS: Primary | ICD-10-CM

## 2020-03-30 DIAGNOSIS — B96.89 ACUTE BACTERIAL BRONCHITIS: Primary | ICD-10-CM

## 2020-03-30 RX ORDER — AZITHROMYCIN 250 MG/1
TABLET, FILM COATED ORAL
Qty: 6 TABLET | Refills: 1 | Status: SHIPPED | OUTPATIENT
Start: 2020-03-30 | End: 2020-04-04

## 2020-04-08 ENCOUNTER — TELEPHONE (OUTPATIENT)
Dept: OTHER | Facility: OTHER | Age: 50
End: 2020-04-08

## 2020-04-08 ENCOUNTER — NURSE TRIAGE (OUTPATIENT)
Dept: OTHER | Facility: OTHER | Age: 50
End: 2020-04-08

## 2020-04-09 ENCOUNTER — TELEPHONE (OUTPATIENT)
Dept: FAMILY MEDICINE CLINIC | Facility: CLINIC | Age: 50
End: 2020-04-09

## 2020-04-09 ENCOUNTER — TELEMEDICINE (OUTPATIENT)
Dept: FAMILY MEDICINE CLINIC | Facility: CLINIC | Age: 50
End: 2020-04-09
Payer: COMMERCIAL

## 2020-04-09 DIAGNOSIS — J45.21 MILD INTERMITTENT ASTHMATIC BRONCHITIS WITH ACUTE EXACERBATION: Primary | ICD-10-CM

## 2020-04-09 DIAGNOSIS — J98.8 CONGESTION OF RESPIRATORY TRACT: ICD-10-CM

## 2020-04-09 DIAGNOSIS — F41.1 GAD (GENERALIZED ANXIETY DISORDER): Chronic | ICD-10-CM

## 2020-04-09 PROBLEM — J45.901 ASTHMATIC BRONCHITIS WITH ACUTE EXACERBATION: Status: ACTIVE | Noted: 2020-04-09

## 2020-04-09 PROCEDURE — G2012 BRIEF CHECK IN BY MD/QHP: HCPCS | Performed by: FAMILY MEDICINE

## 2020-04-09 RX ORDER — ALBUTEROL SULFATE 90 UG/1
2 AEROSOL, METERED RESPIRATORY (INHALATION) EVERY 6 HOURS PRN
Qty: 1 INHALER | Refills: 5 | Status: ON HOLD | OUTPATIENT
Start: 2020-04-09 | End: 2021-07-27 | Stop reason: SDUPTHER

## 2020-04-09 RX ORDER — ALBUTEROL SULFATE 2.5 MG/3ML
2.5 SOLUTION RESPIRATORY (INHALATION) EVERY 6 HOURS PRN
Qty: 120 VIAL | Refills: 5 | Status: ON HOLD | OUTPATIENT
Start: 2020-04-09 | End: 2021-07-27 | Stop reason: SDUPTHER

## 2020-04-15 ENCOUNTER — TELEPHONE (OUTPATIENT)
Dept: FAMILY MEDICINE CLINIC | Facility: CLINIC | Age: 50
End: 2020-04-15

## 2020-04-15 DIAGNOSIS — J30.0 VASOMOTOR RHINITIS: Primary | ICD-10-CM

## 2020-04-15 RX ORDER — FLUTICASONE PROPIONATE 50 MCG
1 SPRAY, SUSPENSION (ML) NASAL DAILY
Qty: 1 BOTTLE | Refills: 5 | Status: SHIPPED | OUTPATIENT
Start: 2020-04-15 | End: 2021-07-29 | Stop reason: HOSPADM

## 2020-05-08 ENCOUNTER — TELEPHONE (OUTPATIENT)
Dept: FAMILY MEDICINE CLINIC | Facility: CLINIC | Age: 50
End: 2020-05-08

## 2020-05-08 DIAGNOSIS — N91.2 AMENORRHEA: Primary | ICD-10-CM

## 2020-07-14 ENCOUNTER — TELEPHONE (OUTPATIENT)
Dept: FAMILY MEDICINE CLINIC | Facility: CLINIC | Age: 50
End: 2020-07-14

## 2020-07-14 NOTE — TELEPHONE ENCOUNTER
I contacted the patient for a telephone virtual visit  Her  or significant other answer the phone at the time of the virtual visit and attempted to awaken her and she refused after 3 attempts to speak to me and then he hung up the phone on me    The visit was considered a no show at this point and I ask the office staff to set up another time if the patient is interested she can call back

## 2020-07-14 NOTE — TELEPHONE ENCOUNTER
So many issues I did schedule for appt this afternoon but wanted to make you aware what she wants at appt
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
## 2020-07-16 NOTE — ED NOTES
Patient Seen in: [facility] AND [facility] Emergency Department      History   Patient presents with:  Fall    Stated Complaint:     HPI    61-year-old male presents from [place] for a fall.   Patient states he was carrying a box while trying to walk with his w Patient is requesting her medications  Notified YUDY Cunningham  10/22/19 9159 Performed by Naa Sanchez MD at [address] [place] [address]   • HOLMIUM  LITHOTRIPSY LASER WITH CYSTOSCOPY Right 6/26/2014    Performed by Naa Sanchez MD at [address] [place] [address]   • LUMBAR INTERLAMINAR EPIDURAL INJECTION N/A 3/29/2019    Performed Vitals signs and nursing note reviewed. Constitutional:       Appearance: Normal appearance. He is well-developed. HENT:      Head: Normocephalic and atraumatic.       Right Ear: External ear normal.      Left Ear: External ear normal.      Nose: Nose n Left shoulder: He exhibits tenderness. He exhibits normal range of motion, no bony tenderness, no swelling and no deformity. Right elbow: Normal.     Left elbow: He exhibits laceration.       Right wrist: Normal.      Left wrist: Normal.      Right I discussed risks and benefits and verbal consent was obtained. Time out performed. Location: Hand  left  Length: 4 cm  Cleaning: standard  Foreign Body: no foreign bodies  The wound area was irrigated with sterile saline and draped in a sterile fashion. We recommend that you schedule follow up care with a primary care provider within the next three months to obtain basic health screening including reassessment of your blood pressure.       Medications Prescribed:  Discharge Medication List as of 7/16/2020

## 2020-08-03 ENCOUNTER — HOSPITAL ENCOUNTER (EMERGENCY)
Facility: HOSPITAL | Age: 50
Discharge: HOME | End: 2020-08-04
Attending: EMERGENCY MEDICINE | Admitting: EMERGENCY MEDICINE
Payer: COMMERCIAL

## 2020-08-03 DIAGNOSIS — F10.929 ALCOHOL INTOXICATION (HCC): Primary | ICD-10-CM

## 2020-08-03 LAB
ALBUMIN SERPL BCP-MCNC: 2.9 G/DL (ref 3.5–5)
ALP SERPL-CCNC: 193 U/L (ref 46–116)
ALT SERPL W P-5'-P-CCNC: 60 U/L (ref 12–78)
ANION GAP SERPL CALCULATED.3IONS-SCNC: 12 MMOL/L (ref 4–13)
APAP SERPL-MCNC: <2 UG/ML (ref 10–20)
AST SERPL W P-5'-P-CCNC: 109 U/L (ref 5–45)
BASOPHILS # BLD AUTO: 0.07 THOUSANDS/ΜL (ref 0–0.1)
BASOPHILS NFR BLD AUTO: 1 % (ref 0–1)
BILIRUB DIRECT SERPL-MCNC: 0.12 MG/DL (ref 0–0.2)
BILIRUB SERPL-MCNC: 0.2 MG/DL (ref 0.2–1)
BUN SERPL-MCNC: 1 MG/DL (ref 5–25)
CALCIUM SERPL-MCNC: 7.8 MG/DL (ref 8.3–10.1)
CHLORIDE SERPL-SCNC: 98 MMOL/L (ref 100–108)
CO2 SERPL-SCNC: 28 MMOL/L (ref 21–32)
CREAT SERPL-MCNC: 0.55 MG/DL (ref 0.6–1.3)
EOSINOPHIL # BLD AUTO: 0.24 THOUSAND/ΜL (ref 0–0.61)
EOSINOPHIL NFR BLD AUTO: 4 % (ref 0–6)
ERYTHROCYTE [DISTWIDTH] IN BLOOD BY AUTOMATED COUNT: 13.2 % (ref 11.6–15.1)
ETHANOL SERPL-MCNC: 369 MG/DL (ref 0–3)
GFR SERPL CREATININE-BSD FRML MDRD: 110 ML/MIN/1.73SQ M
GLUCOSE SERPL-MCNC: 104 MG/DL (ref 65–140)
HCT VFR BLD AUTO: 45 % (ref 34.8–46.1)
HGB BLD-MCNC: 14.9 G/DL (ref 11.5–15.4)
IMM GRANULOCYTES # BLD AUTO: 0.06 THOUSAND/UL (ref 0–0.2)
IMM GRANULOCYTES NFR BLD AUTO: 1 % (ref 0–2)
LYMPHOCYTES # BLD AUTO: 2.03 THOUSANDS/ΜL (ref 0.6–4.47)
LYMPHOCYTES NFR BLD AUTO: 31 % (ref 14–44)
MCH RBC QN AUTO: 33.2 PG (ref 26.8–34.3)
MCHC RBC AUTO-ENTMCNC: 33.1 G/DL (ref 31.4–37.4)
MCV RBC AUTO: 100 FL (ref 82–98)
MONOCYTES # BLD AUTO: 1.09 THOUSAND/ΜL (ref 0.17–1.22)
MONOCYTES NFR BLD AUTO: 17 % (ref 4–12)
NEUTROPHILS # BLD AUTO: 3.06 THOUSANDS/ΜL (ref 1.85–7.62)
NEUTS SEG NFR BLD AUTO: 46 % (ref 43–75)
NRBC BLD AUTO-RTO: 0 /100 WBCS
PLATELET # BLD AUTO: 287 THOUSANDS/UL (ref 149–390)
PMV BLD AUTO: 8.2 FL (ref 8.9–12.7)
POTASSIUM SERPL-SCNC: 3.4 MMOL/L (ref 3.5–5.3)
PROT SERPL-MCNC: 6.6 G/DL (ref 6.4–8.2)
RBC # BLD AUTO: 4.49 MILLION/UL (ref 3.81–5.12)
SALICYLATES SERPL-MCNC: <3 MG/DL (ref 3–20)
SARS-COV-2 RNA RESP QL NAA+PROBE: NEGATIVE
SODIUM SERPL-SCNC: 138 MMOL/L (ref 136–145)
WBC # BLD AUTO: 6.55 THOUSAND/UL (ref 4.31–10.16)

## 2020-08-03 PROCEDURE — 99285 EMERGENCY DEPT VISIT HI MDM: CPT

## 2020-08-03 PROCEDURE — 80048 BASIC METABOLIC PNL TOTAL CA: CPT | Performed by: EMERGENCY MEDICINE

## 2020-08-03 PROCEDURE — 99284 EMERGENCY DEPT VISIT MOD MDM: CPT | Performed by: EMERGENCY MEDICINE

## 2020-08-03 PROCEDURE — 96361 HYDRATE IV INFUSION ADD-ON: CPT

## 2020-08-03 PROCEDURE — 36415 COLL VENOUS BLD VENIPUNCTURE: CPT | Performed by: EMERGENCY MEDICINE

## 2020-08-03 PROCEDURE — 80329 ANALGESICS NON-OPIOID 1 OR 2: CPT | Performed by: EMERGENCY MEDICINE

## 2020-08-03 PROCEDURE — 80076 HEPATIC FUNCTION PANEL: CPT | Performed by: EMERGENCY MEDICINE

## 2020-08-03 PROCEDURE — 85025 COMPLETE CBC W/AUTO DIFF WBC: CPT | Performed by: EMERGENCY MEDICINE

## 2020-08-03 PROCEDURE — 80320 DRUG SCREEN QUANTALCOHOLS: CPT | Performed by: EMERGENCY MEDICINE

## 2020-08-03 PROCEDURE — 87635 SARS-COV-2 COVID-19 AMP PRB: CPT | Performed by: EMERGENCY MEDICINE

## 2020-08-03 RX ORDER — NICOTINE 21 MG/24HR
14 PATCH, TRANSDERMAL 24 HOURS TRANSDERMAL ONCE
Status: DISCONTINUED | OUTPATIENT
Start: 2020-08-04 | End: 2020-08-04 | Stop reason: HOSPADM

## 2020-08-03 RX ORDER — FOLIC ACID 1 MG/1
1 TABLET ORAL DAILY
Status: DISCONTINUED | OUTPATIENT
Start: 2020-08-04 | End: 2020-08-04 | Stop reason: HOSPADM

## 2020-08-03 RX ORDER — THIAMINE MONONITRATE (VIT B1) 100 MG
100 TABLET ORAL DAILY
Status: DISCONTINUED | OUTPATIENT
Start: 2020-08-04 | End: 2020-08-04 | Stop reason: HOSPADM

## 2020-08-03 RX ORDER — GABAPENTIN 300 MG/1
300 CAPSULE ORAL ONCE
Status: COMPLETED | OUTPATIENT
Start: 2020-08-04 | End: 2020-08-04

## 2020-08-03 RX ADMIN — SODIUM CHLORIDE 1000 ML: 0.9 INJECTION, SOLUTION INTRAVENOUS at 21:16

## 2020-08-04 ENCOUNTER — APPOINTMENT (EMERGENCY)
Dept: CT IMAGING | Facility: HOSPITAL | Age: 50
End: 2020-08-04
Payer: COMMERCIAL

## 2020-08-04 VITALS
BODY MASS INDEX: 21.14 KG/M2 | TEMPERATURE: 98.5 F | RESPIRATION RATE: 16 BRPM | SYSTOLIC BLOOD PRESSURE: 130 MMHG | HEART RATE: 100 BPM | OXYGEN SATURATION: 93 % | DIASTOLIC BLOOD PRESSURE: 90 MMHG | WEIGHT: 108.25 LBS

## 2020-08-04 LAB
AMPHETAMINES SERPL QL SCN: NEGATIVE
BARBITURATES UR QL: NEGATIVE
BENZODIAZ UR QL: NEGATIVE
COCAINE UR QL: NEGATIVE
ETHANOL SERPL-MCNC: 33 MG/DL (ref 0–3)
METHADONE UR QL: NEGATIVE
OPIATES UR QL SCN: NEGATIVE
OXYCODONE+OXYMORPHONE UR QL SCN: NEGATIVE
PCP UR QL: NEGATIVE
THC UR QL: NEGATIVE

## 2020-08-04 PROCEDURE — 70450 CT HEAD/BRAIN W/O DYE: CPT

## 2020-08-04 PROCEDURE — 96374 THER/PROPH/DIAG INJ IV PUSH: CPT

## 2020-08-04 PROCEDURE — 80307 DRUG TEST PRSMV CHEM ANLYZR: CPT | Performed by: EMERGENCY MEDICINE

## 2020-08-04 PROCEDURE — 36415 COLL VENOUS BLD VENIPUNCTURE: CPT | Performed by: EMERGENCY MEDICINE

## 2020-08-04 PROCEDURE — 70486 CT MAXILLOFACIAL W/O DYE: CPT

## 2020-08-04 PROCEDURE — 96375 TX/PRO/DX INJ NEW DRUG ADDON: CPT

## 2020-08-04 PROCEDURE — 80320 DRUG SCREEN QUANTALCOHOLS: CPT | Performed by: EMERGENCY MEDICINE

## 2020-08-04 PROCEDURE — 72125 CT NECK SPINE W/O DYE: CPT

## 2020-08-04 PROCEDURE — G1004 CDSM NDSC: HCPCS

## 2020-08-04 RX ORDER — LORAZEPAM 1 MG/1
1 TABLET ORAL ONCE
Status: COMPLETED | OUTPATIENT
Start: 2020-08-04 | End: 2020-08-04

## 2020-08-04 RX ORDER — ONDANSETRON 2 MG/ML
INJECTION INTRAMUSCULAR; INTRAVENOUS
Status: COMPLETED
Start: 2020-08-04 | End: 2020-08-04

## 2020-08-04 RX ORDER — ONDANSETRON 2 MG/ML
4 INJECTION INTRAMUSCULAR; INTRAVENOUS ONCE
Status: COMPLETED | OUTPATIENT
Start: 2020-08-04 | End: 2020-08-04

## 2020-08-04 RX ORDER — LORAZEPAM 2 MG/ML
INJECTION INTRAMUSCULAR
Status: COMPLETED
Start: 2020-08-04 | End: 2020-08-04

## 2020-08-04 RX ORDER — LORAZEPAM 2 MG/ML
2 INJECTION INTRAMUSCULAR ONCE
Status: COMPLETED | OUTPATIENT
Start: 2020-08-04 | End: 2020-08-04

## 2020-08-04 RX ADMIN — LORAZEPAM 2 MG: 2 INJECTION INTRAMUSCULAR at 05:42

## 2020-08-04 RX ADMIN — FOLIC ACID 1 MG: 1 TABLET ORAL at 08:32

## 2020-08-04 RX ADMIN — LORAZEPAM 2 MG: 2 INJECTION INTRAMUSCULAR; INTRAVENOUS at 05:42

## 2020-08-04 RX ADMIN — NICOTINE 14 MG: 14 PATCH TRANSDERMAL at 00:03

## 2020-08-04 RX ADMIN — Medication 1 TABLET: at 08:32

## 2020-08-04 RX ADMIN — ONDANSETRON 4 MG: 2 INJECTION INTRAMUSCULAR; INTRAVENOUS at 05:42

## 2020-08-04 RX ADMIN — THIAMINE HCL TAB 100 MG 100 MG: 100 TAB at 08:32

## 2020-08-04 RX ADMIN — GABAPENTIN 300 MG: 300 CAPSULE ORAL at 00:03

## 2020-08-04 RX ADMIN — LORAZEPAM 1 MG: 1 TABLET ORAL at 08:33

## 2020-08-04 NOTE — ED NOTES
Patient yelling for a nurse, when checking on patient she stated she would like another blanket as she is cold    This writer provided patient with a blanket     Kennedi Mcdermott  08/03/20 5133
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
## 2020-08-04 NOTE — ED NOTES
Pt given paper scrubs to put on, discharge papers given and IV removed  Per patient she has a ride home        Ramya Holloway RN  08/04/20 5005
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
## 2020-08-04 NOTE — ED NOTES
Met with patient, who is declining inpatient detox at this time, and is indicating that she wishes to return home  Patient is requesting an x-ray of her chest, indicating that she "has spots on her lungs  Physician made aware  Patient declined outpatient resources      Cande Birmingham LMSW  08/04/20  [id number]

## 2020-08-15 ENCOUNTER — APPOINTMENT (EMERGENCY)
Dept: CT IMAGING | Facility: HOSPITAL | Age: 50
DRG: 750 | End: 2020-08-15
Payer: COMMERCIAL

## 2020-08-15 ENCOUNTER — HOSPITAL ENCOUNTER (INPATIENT)
Facility: HOSPITAL | Age: 50
LOS: 9 days | Discharge: HOME | DRG: 750 | End: 2020-08-26
Attending: EMERGENCY MEDICINE | Admitting: PSYCHIATRY & NEUROLOGY
Payer: COMMERCIAL

## 2020-08-15 DIAGNOSIS — M54.50 CHRONIC MIDLINE LOW BACK PAIN WITHOUT SCIATICA: ICD-10-CM

## 2020-08-15 DIAGNOSIS — R45.851 SUICIDAL IDEATION: ICD-10-CM

## 2020-08-15 DIAGNOSIS — K76.0 HEPATIC STEATOSIS: ICD-10-CM

## 2020-08-15 DIAGNOSIS — F25.0 SCHIZOAFFECTIVE DISORDER, BIPOLAR TYPE (HCC): Primary | Chronic | ICD-10-CM

## 2020-08-15 DIAGNOSIS — J84.9 ILD (INTERSTITIAL LUNG DISEASE) (HCC): ICD-10-CM

## 2020-08-15 DIAGNOSIS — G89.29 CHRONIC MIDLINE LOW BACK PAIN WITHOUT SCIATICA: ICD-10-CM

## 2020-08-15 LAB
ALBUMIN SERPL BCP-MCNC: 3.7 G/DL (ref 3.5–5.7)
ALP SERPL-CCNC: 146 U/L (ref 40–150)
ALT SERPL W P-5'-P-CCNC: 42 U/L (ref 7–52)
AMPHETAMINES SERPL QL SCN: POSITIVE
ANION GAP SERPL CALCULATED.3IONS-SCNC: 11 MMOL/L (ref 4–13)
AST SERPL W P-5'-P-CCNC: 87 U/L (ref 13–39)
BACTERIA UR QL AUTO: NORMAL /HPF
BARBITURATES UR QL: NEGATIVE
BASOPHILS # BLD AUTO: 0 THOUSANDS/ΜL (ref 0–0.1)
BASOPHILS NFR BLD AUTO: 0 % (ref 0–2)
BENZODIAZ UR QL: NEGATIVE
BILIRUB SERPL-MCNC: 0.6 MG/DL (ref 0.2–1)
BILIRUB UR QL STRIP: NEGATIVE
BUN SERPL-MCNC: 3 MG/DL (ref 7–25)
CALCIUM SERPL-MCNC: 9.1 MG/DL (ref 8.6–10.5)
CHLORIDE SERPL-SCNC: 96 MMOL/L (ref 98–107)
CLARITY UR: CLEAR
CO2 SERPL-SCNC: 26 MMOL/L (ref 21–31)
COCAINE UR QL: NEGATIVE
COLOR UR: YELLOW
CREAT SERPL-MCNC: 0.45 MG/DL (ref 0.6–1.2)
EOSINOPHIL # BLD AUTO: 0.1 THOUSAND/ΜL (ref 0–0.61)
EOSINOPHIL NFR BLD AUTO: 1 % (ref 0–5)
ERYTHROCYTE [DISTWIDTH] IN BLOOD BY AUTOMATED COUNT: 12.9 % (ref 11.5–14.5)
ETHANOL SERPL-MCNC: 116.8 MG/DL
ETHANOL SERPL-MCNC: 43.3 MG/DL
EXT PREG TEST URINE: NEGATIVE
EXT. CONTROL ED NAV: POSITIVE
GFR SERPL CREATININE-BSD FRML MDRD: 118 ML/MIN/1.73SQ M
GLUCOSE SERPL-MCNC: 72 MG/DL (ref 65–99)
GLUCOSE UR STRIP-MCNC: NEGATIVE MG/DL
HCT VFR BLD AUTO: 45.1 % (ref 42–47)
HGB BLD-MCNC: 15.9 G/DL (ref 12–16)
HGB UR QL STRIP.AUTO: ABNORMAL
KETONES UR STRIP-MCNC: NEGATIVE MG/DL
LEUKOCYTE ESTERASE UR QL STRIP: NEGATIVE
LIPASE SERPL-CCNC: 18 U/L (ref 11–82)
LYMPHOCYTES # BLD AUTO: 1.9 THOUSANDS/ΜL (ref 0.6–4.47)
LYMPHOCYTES NFR BLD AUTO: 19 % (ref 21–51)
MCH RBC QN AUTO: 36 PG (ref 26–34)
MCHC RBC AUTO-ENTMCNC: 35.3 G/DL (ref 31–37)
MCV RBC AUTO: 102 FL (ref 81–99)
METHADONE UR QL: NEGATIVE
MONOCYTES # BLD AUTO: 1 THOUSAND/ΜL (ref 0.17–1.22)
MONOCYTES NFR BLD AUTO: 10 % (ref 2–12)
NEUTROPHILS # BLD AUTO: 6.7 THOUSANDS/ΜL (ref 1.4–6.5)
NEUTS SEG NFR BLD AUTO: 70 % (ref 42–75)
NITRITE UR QL STRIP: NEGATIVE
NON-SQ EPI CELLS URNS QL MICRO: NORMAL /HPF
OPIATES UR QL SCN: NEGATIVE
OXYCODONE+OXYMORPHONE UR QL SCN: NEGATIVE
PCP UR QL: NEGATIVE
PH UR STRIP.AUTO: 5.5 [PH]
PLATELET # BLD AUTO: 212 THOUSANDS/UL (ref 149–390)
PMV BLD AUTO: 7.3 FL (ref 8.6–11.7)
POTASSIUM SERPL-SCNC: 3.4 MMOL/L (ref 3.5–5.5)
PROT SERPL-MCNC: 6.8 G/DL (ref 6.4–8.9)
PROT UR STRIP-MCNC: NEGATIVE MG/DL
RBC # BLD AUTO: 4.42 MILLION/UL (ref 3.9–5.2)
RBC #/AREA URNS AUTO: NORMAL /HPF
SARS-COV-2 RNA RESP QL NAA+PROBE: NEGATIVE
SODIUM SERPL-SCNC: 133 MMOL/L (ref 134–143)
SP GR UR STRIP.AUTO: <=1.005 (ref 1–1.03)
THC UR QL: NEGATIVE
UROBILINOGEN UR QL STRIP.AUTO: 0.2 E.U./DL
WBC # BLD AUTO: 9.7 THOUSAND/UL (ref 4.8–10.8)
WBC #/AREA URNS AUTO: NORMAL /HPF

## 2020-08-15 PROCEDURE — 74177 CT ABD & PELVIS W/CONTRAST: CPT

## 2020-08-15 PROCEDURE — 99285 EMERGENCY DEPT VISIT HI MDM: CPT

## 2020-08-15 PROCEDURE — 81001 URINALYSIS AUTO W/SCOPE: CPT | Performed by: EMERGENCY MEDICINE

## 2020-08-15 PROCEDURE — 85025 COMPLETE CBC W/AUTO DIFF WBC: CPT | Performed by: EMERGENCY MEDICINE

## 2020-08-15 PROCEDURE — 80053 COMPREHEN METABOLIC PANEL: CPT | Performed by: EMERGENCY MEDICINE

## 2020-08-15 PROCEDURE — 80307 DRUG TEST PRSMV CHEM ANLYZR: CPT | Performed by: EMERGENCY MEDICINE

## 2020-08-15 PROCEDURE — 96361 HYDRATE IV INFUSION ADD-ON: CPT

## 2020-08-15 PROCEDURE — G1004 CDSM NDSC: HCPCS

## 2020-08-15 PROCEDURE — 87635 SARS-COV-2 COVID-19 AMP PRB: CPT | Performed by: EMERGENCY MEDICINE

## 2020-08-15 PROCEDURE — 96374 THER/PROPH/DIAG INJ IV PUSH: CPT

## 2020-08-15 PROCEDURE — 83690 ASSAY OF LIPASE: CPT | Performed by: EMERGENCY MEDICINE

## 2020-08-15 PROCEDURE — 71260 CT THORAX DX C+: CPT

## 2020-08-15 PROCEDURE — 80320 DRUG SCREEN QUANTALCOHOLS: CPT | Performed by: EMERGENCY MEDICINE

## 2020-08-15 PROCEDURE — 99285 EMERGENCY DEPT VISIT HI MDM: CPT | Performed by: EMERGENCY MEDICINE

## 2020-08-15 PROCEDURE — 36415 COLL VENOUS BLD VENIPUNCTURE: CPT | Performed by: EMERGENCY MEDICINE

## 2020-08-15 PROCEDURE — 81025 URINE PREGNANCY TEST: CPT | Performed by: EMERGENCY MEDICINE

## 2020-08-15 RX ORDER — LORAZEPAM 2 MG/ML
2 INJECTION INTRAMUSCULAR ONCE
Status: COMPLETED | OUTPATIENT
Start: 2020-08-15 | End: 2020-08-15

## 2020-08-15 RX ORDER — ONDANSETRON 4 MG/1
4 TABLET, ORALLY DISINTEGRATING ORAL ONCE
Status: DISCONTINUED | OUTPATIENT
Start: 2020-08-15 | End: 2020-08-17

## 2020-08-15 RX ORDER — OLANZAPINE 10 MG/1
10 INJECTION, POWDER, LYOPHILIZED, FOR SOLUTION INTRAMUSCULAR ONCE
Status: DISCONTINUED | OUTPATIENT
Start: 2020-08-15 | End: 2020-08-15

## 2020-08-15 RX ORDER — ALBUTEROL SULFATE 90 UG/1
2 AEROSOL, METERED RESPIRATORY (INHALATION) EVERY 4 HOURS PRN
Status: DISCONTINUED | OUTPATIENT
Start: 2020-08-15 | End: 2020-08-17

## 2020-08-15 RX ADMIN — SODIUM CHLORIDE 1000 ML: 0.9 INJECTION, SOLUTION INTRAVENOUS at 14:19

## 2020-08-15 RX ADMIN — LORAZEPAM 2 MG: 2 INJECTION INTRAMUSCULAR; INTRAVENOUS at 19:42

## 2020-08-15 RX ADMIN — IOHEXOL 85 ML: 350 INJECTION, SOLUTION INTRAVENOUS at 14:57
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
## 2020-08-15 NOTE — ED NOTES
CIS called [facility]  at [phone] and then  Tamar Garcia called back and was informed that patient was in emergency room  Payton Tirado reports that patient has been increasingly verbally abusive with increased alcohol use and recommends inpatient behavioral health admission
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
## 2020-08-16 RX ORDER — GABAPENTIN 300 MG/1
600 CAPSULE ORAL ONCE
Status: DISCONTINUED | OUTPATIENT
Start: 2020-08-16 | End: 2020-08-17

## 2020-08-16 RX ORDER — CLONAZEPAM 0.5 MG/1
0.5 TABLET ORAL ONCE
Status: COMPLETED | OUTPATIENT
Start: 2020-08-16 | End: 2020-08-16

## 2020-08-16 RX ORDER — GABAPENTIN 300 MG/1
600 CAPSULE ORAL ONCE
Status: COMPLETED | OUTPATIENT
Start: 2020-08-16 | End: 2020-08-16

## 2020-08-16 RX ORDER — LORAZEPAM 1 MG/1
1 TABLET ORAL ONCE
Status: COMPLETED | OUTPATIENT
Start: 2020-08-16 | End: 2020-08-16

## 2020-08-16 RX ORDER — LORAZEPAM 1 MG/1
2 TABLET ORAL ONCE
Status: COMPLETED | OUTPATIENT
Start: 2020-08-16 | End: 2020-08-16

## 2020-08-16 RX ORDER — BENZTROPINE MESYLATE 0.5 MG/1
1 TABLET ORAL ONCE
Status: DISCONTINUED | OUTPATIENT
Start: 2020-08-16 | End: 2020-08-17

## 2020-08-16 RX ADMIN — LORAZEPAM 1 MG: 1 TABLET ORAL at 10:46

## 2020-08-16 RX ADMIN — GABAPENTIN 600 MG: 300 CAPSULE ORAL at 13:09

## 2020-08-16 RX ADMIN — CLONAZEPAM 0.5 MG: 0.5 TABLET ORAL at 08:11

## 2020-08-16 RX ADMIN — CLONAZEPAM 0.5 MG: 0.5 TABLET ORAL at 13:09

## 2020-08-16 RX ADMIN — LORAZEPAM 2 MG: 1 TABLET ORAL at 16:33

## 2020-08-16 RX ADMIN — ALBUTEROL SULFATE 2 PUFF: 90 AEROSOL, METERED RESPIRATORY (INHALATION) at 16:34

## 2020-08-16 RX ADMIN — ALBUTEROL SULFATE 2 PUFF: 90 AEROSOL, METERED RESPIRATORY (INHALATION) at 10:16

## 2020-08-16 RX ADMIN — GABAPENTIN 600 MG: 300 CAPSULE ORAL at 08:11

## 2020-08-16 NOTE — ED NOTES
Provided patient an update, patient demanded to be transferred to first hospital or stay here  "your not sending me to [facility] "

## 2020-08-16 NOTE — ED NOTES
CIS did referral  Bed search has been exhausted as follows:  Clinicals faxed to the following facilities:  [facility]    No beds available at the following facilities:  [facility]: no beds at [facility] or [facility]    Bed search is to be revisited next shift

## 2020-08-16 NOTE — ED NOTES
Pt requesting evening medications, reviewed with ED provider, medications ordered, pt asleep when RN arrived at bedside with medications       Mohsen Giles RN  08/16/20 1010

## 2020-08-16 NOTE — ED NOTES
Patient requested for clean scrubs and items to get washed for the day, given the same but states she will not change until she is ready to do so  Clean scrubs and toothbrush at bedside for patient  One to one at bedside with patient  Calm and cooperative at this time        Yanick Parekh RN  08/16/20 6640

## 2020-08-16 NOTE — ED NOTES
Patient states she did not get any of her night time medication- asking for gabapentin 600 mg and klonopin mg  ED provider aware  Patient asking for her breakfast, made aware that it has been ordered        Renelda Cabot, RN  08/16/20 0800

## 2020-08-17 PROBLEM — Z72.0 TOBACCO ABUSE: Status: ACTIVE | Noted: 2020-08-17

## 2020-08-17 PROBLEM — E78.5 DYSLIPIDEMIA: Status: RESOLVED | Noted: 2019-10-22 | Resolved: 2020-08-17

## 2020-08-17 PROBLEM — N32.81 OVERACTIVE BLADDER: Status: RESOLVED | Noted: 2019-11-04 | Resolved: 2020-08-17

## 2020-08-17 PROBLEM — J98.8 CONGESTION OF RESPIRATORY TRACT: Status: RESOLVED | Noted: 2019-11-03 | Resolved: 2020-08-17

## 2020-08-17 PROBLEM — J45.20 MILD INTERMITTENT ASTHMA WITHOUT COMPLICATION: Chronic | Status: ACTIVE | Noted: 2020-04-09

## 2020-08-17 PROBLEM — E78.5 HYPERLIPIDEMIA: Status: RESOLVED | Noted: 2017-10-31 | Resolved: 2020-08-17

## 2020-08-17 PROCEDURE — 99253 IP/OBS CNSLTJ NEW/EST LOW 45: CPT | Performed by: HOSPITALIST

## 2020-08-17 RX ORDER — LORAZEPAM 2 MG/ML
2 INJECTION INTRAMUSCULAR EVERY 6 HOURS PRN
Status: DISCONTINUED | OUTPATIENT
Start: 2020-08-17 | End: 2020-08-26 | Stop reason: HOSPADM

## 2020-08-17 RX ORDER — CLONAZEPAM 0.5 MG/1
0.5 TABLET ORAL 3 TIMES DAILY
Status: DISCONTINUED | OUTPATIENT
Start: 2020-08-17 | End: 2020-08-19

## 2020-08-17 RX ORDER — LORAZEPAM 1 MG/1
1 TABLET ORAL ONCE
Status: COMPLETED | OUTPATIENT
Start: 2020-08-17 | End: 2020-08-17

## 2020-08-17 RX ORDER — GABAPENTIN 300 MG/1
300 CAPSULE ORAL 3 TIMES DAILY
Status: DISCONTINUED | OUTPATIENT
Start: 2020-08-17 | End: 2020-08-17

## 2020-08-17 RX ORDER — RISPERIDONE 1 MG/1
2 TABLET, ORALLY DISINTEGRATING ORAL
Status: DISCONTINUED | OUTPATIENT
Start: 2020-08-17 | End: 2020-08-26 | Stop reason: HOSPADM

## 2020-08-17 RX ORDER — HYDROXYZINE HYDROCHLORIDE 25 MG/1
50 TABLET, FILM COATED ORAL EVERY 4 HOURS PRN
Status: DISCONTINUED | OUTPATIENT
Start: 2020-08-17 | End: 2020-08-19

## 2020-08-17 RX ORDER — GABAPENTIN 300 MG/1
300 CAPSULE ORAL 3 TIMES DAILY
Status: DISCONTINUED | OUTPATIENT
Start: 2020-08-17 | End: 2020-08-18

## 2020-08-17 RX ORDER — CLONAZEPAM 0.5 MG/1
0.5 TABLET ORAL 3 TIMES DAILY
Status: DISCONTINUED | OUTPATIENT
Start: 2020-08-17 | End: 2020-08-17

## 2020-08-17 RX ORDER — NICOTINE 21 MG/24HR
1 PATCH, TRANSDERMAL 24 HOURS TRANSDERMAL DAILY
Status: DISCONTINUED | OUTPATIENT
Start: 2020-08-17 | End: 2020-08-20

## 2020-08-17 RX ORDER — ALBUTEROL SULFATE 90 UG/1
2 AEROSOL, METERED RESPIRATORY (INHALATION) EVERY 4 HOURS PRN
Status: DISCONTINUED | OUTPATIENT
Start: 2020-08-17 | End: 2020-08-26 | Stop reason: HOSPADM

## 2020-08-17 RX ORDER — GABAPENTIN 300 MG/1
600 CAPSULE ORAL ONCE
Status: COMPLETED | OUTPATIENT
Start: 2020-08-17 | End: 2020-08-17

## 2020-08-17 RX ORDER — CLONAZEPAM 0.5 MG/1
0.5 TABLET ORAL ONCE
Status: COMPLETED | OUTPATIENT
Start: 2020-08-17 | End: 2020-08-17

## 2020-08-17 RX ORDER — FLUTICASONE PROPIONATE 50 MCG
1 SPRAY, SUSPENSION (ML) NASAL DAILY
Status: DISCONTINUED | OUTPATIENT
Start: 2020-08-17 | End: 2020-08-26 | Stop reason: HOSPADM

## 2020-08-17 RX ORDER — ACETAMINOPHEN 325 MG/1
650 TABLET ORAL EVERY 4 HOURS PRN
Status: DISCONTINUED | OUTPATIENT
Start: 2020-08-17 | End: 2020-08-26 | Stop reason: HOSPADM

## 2020-08-17 RX ORDER — HALOPERIDOL 5 MG/ML
5 INJECTION INTRAMUSCULAR EVERY 6 HOURS PRN
Status: DISCONTINUED | OUTPATIENT
Start: 2020-08-17 | End: 2020-08-26 | Stop reason: HOSPADM

## 2020-08-17 RX ORDER — ACETAMINOPHEN 325 MG/1
650 TABLET ORAL EVERY 6 HOURS PRN
Status: DISCONTINUED | OUTPATIENT
Start: 2020-08-17 | End: 2020-08-26 | Stop reason: HOSPADM

## 2020-08-17 RX ADMIN — CLONAZEPAM 0.5 MG: 0.5 TABLET ORAL at 14:30

## 2020-08-17 RX ADMIN — CLONAZEPAM 0.5 MG: 0.5 TABLET ORAL at 08:58

## 2020-08-17 RX ADMIN — GABAPENTIN 300 MG: 300 CAPSULE ORAL at 14:30

## 2020-08-17 RX ADMIN — NICOTINE 1 PATCH: 14 PATCH, EXTENDED RELEASE TRANSDERMAL at 14:30

## 2020-08-17 RX ADMIN — CLONAZEPAM 0.5 MG: 0.5 TABLET ORAL at 20:02

## 2020-08-17 RX ADMIN — GABAPENTIN 600 MG: 300 CAPSULE ORAL at 08:58

## 2020-08-17 RX ADMIN — LORAZEPAM 1 MG: 1 TABLET ORAL at 04:25

## 2020-08-17 RX ADMIN — ALBUTEROL SULFATE 2 PUFF: 90 AEROSOL, METERED RESPIRATORY (INHALATION) at 16:58

## 2020-08-17 RX ADMIN — FLUTICASONE PROPIONATE 1 SPRAY: 50 SPRAY, METERED NASAL at 16:58

## 2020-08-17 RX ADMIN — GABAPENTIN 300 MG: 300 CAPSULE ORAL at 20:02

## 2020-08-17 NOTE — ED NOTES
Insurance Authorization for admission:   Phone call placed to [facility]  Phone number: [phone]  Spoke to [employer]  14 days approved  Level of care: Inpatient  Review on 8/31/20  Authorization # [id number]  EVS (Eligibility Verification System) called - [phone]    Automated system indicates: managed care    Rec#:  [id number]

## 2020-08-17 NOTE — ED NOTES
Referral faxed to [facility] (Admissions Representative: Ethan) for review

## 2020-08-17 NOTE — PLAN OF CARE
Problem: Ineffective Coping  Goal: Cooperates with admission process  Description: Interventions:   - Complete admission process  Outcome: Progressing  Goal: Participates in unit activities  Description: Interventions:  - Provide therapeutic environment   - Provide required programming   - Redirect inappropriate behaviors   Outcome: Progressing  Goal: Patient/Family participate in treatment and DC plans  Description: Interventions:  - Provide therapeutic environment  Outcome: Progressing  Goal: Patient/Family verbalizes awareness of resources  Outcome: Progressing  Goal: Understands least restrictive measures  Description: Interventions:  - Utilize least restrictive behavior  Outcome: Progressing  Goal: Free from restraint events  Description: - Utilize least restrictive measures   - Provide behavioral interventions   - Redirect inappropriate behaviors   Outcome: Progressing     Problem: Depression  Goal: Treatment Goal: Demonstrate behavioral control of depressive symptoms, verbalize feelings of improved mood/affect, and adopt new coping skills prior to discharge  Outcome: Progressing  Goal: Verbalize thoughts and feelings  Description: Interventions:  - Assess and re-assess patient's level of risk   - Engage patient in 1:1 interactions, daily, for a minimum of 15 minutes   - Encourage patient to express feelings, fears, frustrations, hopes   Outcome: Progressing  Goal: Refrain from harming self  Description: Interventions:  - Monitor patient closely, per order   - Supervise medication ingestion, monitor effects and side effects   Outcome: Progressing  Goal: Refrain from self-neglect  Outcome: Progressing  Goal: Attend and participate in unit activities, including therapeutic, recreational, and educational groups  Description: Interventions:  - Provide therapeutic and educational activities daily, encourage attendance and participation, and document same in the medical record   Outcome: Progressing  Goal: Complete daily ADLs, including personal hygiene independently, as able  Description: Interventions:  - Observe, teach, and assist patient with ADLS  -  Monitor and promote a balance of rest/activity, with adequate nutrition and elimination   Outcome: Progressing     Problem: Ineffective Coping  Goal: Identifies ineffective coping skills  Outcome: Not Progressing  Goal: Identifies healthy coping skills  Outcome: Not Progressing  Goal: Demonstrates healthy coping skills  Outcome: Not Progressing     Problem: Depression  Goal: Refrain from isolation  Description: Interventions:  - Develop a trusting relationship   - Encourage socialization   Outcome: Not Progressing     Problem: Anxiety  Goal: Anxiety is at manageable level  Description: Interventions:  - Assess and monitor patient's anxiety level  - Monitor for signs and symptoms (heart palpitations, chest pain, shortness of breath, headaches, nausea, feeling jumpy, restlessness, irritable, apprehensive)  - Collaborate with interdisciplinary team and initiate plan and interventions as ordered    - [place] patient to unit/surroundings  - Explain treatment plan  - Encourage participation in care  - Encourage verbalization of concerns/fears  - Identify coping mechanisms  - Assist in developing anxiety-reducing skills  - Administer/offer alternative therapies  - Limit or eliminate stimulants  Outcome: Not Progressing

## 2020-08-17 NOTE — ED NOTES
Crisis spoke to Dann with [facility] to begin precert  Crisis waiting for a call back to complete same and obtain an auth#   Crisis to f/u
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
## 2020-08-17 NOTE — ED NOTES
Patient provided with cell phone to write down 3 different phone numbers  Cell phone then placed back in pink purse  No portable phone available  Patient at desk making phone calls        Shawnee Mack RN  08/17/20 8095

## 2020-08-17 NOTE — PROGRESS NOTES
Pt was admitted today  This is a completed list of the pt clothing and belongings:  1 Pr shoes  1 pr slippers  Large bag of assorted clothing   1 ppr earrings   1 black mask   $25 00 [employer] gift Card   1 [employer]   1 EBT Card  1 [employer] Gift Card  !  Pa ID Card  $1 36 in change  1 toothpaste and tooth brush   1 flash light  1 multicolor wallet   1 pack gum  1 black wallet  1 desk diary  1 snake skin wallet  1 blaack bag with pens   2 eye glass cases with glasses   1 yellow bag of cosmetics  Hygenic wipes  1 pk cigarettes  2 cell phone chargers   1 cell phone   1 key chain   1 pk matches   1 lighter 2 lip balm   2 hair clips   1 bag tobacco

## 2020-08-17 NOTE — ASSESSMENT & PLAN NOTE
· Patient is stable - upon my direct evaluation, no evidence of DTs  · Cessation counseling at this time

## 2020-08-17 NOTE — ED NOTES
Patient is accepted at [facility]  Patient is accepted by Dr Adalberto Brito per Myke Saunders with Intake  Patient may go to the floor after the unit calls for report  Nurse report is to be called to [phone] prior to patient transfer

## 2020-08-18 PROCEDURE — 99232 SBSQ HOSP IP/OBS MODERATE 35: CPT | Performed by: PSYCHIATRY & NEUROLOGY

## 2020-08-18 RX ORDER — GABAPENTIN 300 MG/1
600 CAPSULE ORAL 3 TIMES DAILY
Status: DISCONTINUED | OUTPATIENT
Start: 2020-08-18 | End: 2020-08-23

## 2020-08-18 RX ORDER — GABAPENTIN 400 MG/1
400 CAPSULE ORAL 3 TIMES DAILY
Status: DISCONTINUED | OUTPATIENT
Start: 2020-08-18 | End: 2020-08-18

## 2020-08-18 RX ADMIN — CLONAZEPAM 0.5 MG: 0.5 TABLET ORAL at 15:52

## 2020-08-18 RX ADMIN — GABAPENTIN 300 MG: 300 CAPSULE ORAL at 08:29

## 2020-08-18 RX ADMIN — GABAPENTIN 600 MG: 300 CAPSULE ORAL at 15:52

## 2020-08-18 RX ADMIN — NICOTINE 1 PATCH: 14 PATCH, EXTENDED RELEASE TRANSDERMAL at 08:28

## 2020-08-18 RX ADMIN — CLONAZEPAM 0.5 MG: 0.5 TABLET ORAL at 08:29

## 2020-08-18 RX ADMIN — ALBUTEROL SULFATE 2 PUFF: 90 AEROSOL, METERED RESPIRATORY (INHALATION) at 09:37

## 2020-08-18 RX ADMIN — CLONAZEPAM 0.5 MG: 0.5 TABLET ORAL at 20:01

## 2020-08-18 RX ADMIN — GABAPENTIN 600 MG: 300 CAPSULE ORAL at 20:01
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
## 2020-08-18 NOTE — PLAN OF CARE
Problem: Ineffective Coping  Goal: Participates in unit activities  Description: Interventions:  - Provide therapeutic environment   - Provide required programming   - Redirect inappropriate behaviors   Outcome: Progressing   Patient has been guarded/ evasive/ irritable and resistive to redirect at times  Minimal interest in group attendance/ participation  Declined initial RT group; remained on periphery of second group; no participation; left early
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
## 2020-08-18 NOTE — PROGRESS NOTES
Patient bright, alert, ate breakfast with peers  Compliant with medications  Back and forth from phone to hallway to room  Good hygiene  Denies si hi dea/v hallucinations  Yelling delusional statements in hallway about people that are not present  C/O uncontrolled anxiety, blaming others  Will  Maintain on safety precautions and continual monitoring  No needs identified
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
## 2020-08-18 NOTE — PROGRESS NOTES
08/18/20 1500   Team Meeting   Meeting Type Tx Team Meeting   Initial Conference Date 08/18/20   Next Conference Date 09/11/20   Team Members Present   Team Members Present Physician;Nurse;   Physician Team Member Dr Yoli Tafoya Team Member Vesna Alvarez, RN   Social Work Team Member [place], [place]   Patient/Family Present   Patient Present Yes   Patient's Family Present No     Pt presented as distressed about decrease in meds regarding which Dr Conrad Petit listened and prescribed meds to pt's satisfaction  Pt is interested in seeking possible placement at [facility]  She agreed with goals and signed [address]

## 2020-08-18 NOTE — PROGRESS NOTES
Periods of lability and yelling throughout the day  Otherwise peasant and polite to staff  Pt happy about the increase of medications  Compliant   Will continue to monitor,
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
## 2020-08-18 NOTE — TREATMENT PLAN
Treatment Plan Reviw - [address] Enedelia Moreno 53 y o  female MRN: [id number]    [address] [place] [address] [id number] Encounter: [id number]          Admit Date/Time:  8/15/2020  1:09 PM    Treatment Team: Attending Provider: Tran Knapp MD; Registered Nurse: Jace Turner, YUDY; Patient Care Technician: Dara Sloan; Patient Care Assistant: Gerald Perez; Patient Care Technician: Ignacio Sutton; Patient Care Assistant: Sangeetha Schrader; Care Manager: Ras Cameron, YUDY; Registered Nurse: Kayce Youngblood, YUDY; :  Elias Valenzuela    Diagnosis: Principal Problem:    Schizoaffective disorder, bipolar type ([facility] )  Active Problems:    LYNN (generalized anxiety disorder)    Medical clearance for psychiatric admission    Alcohol dependence with unspecified alcohol-induced disorder ([facility] )    Mild intermittent asthma without complication    Tobacco abuse        Patient Strengths: Bahai affiliation, special hobby/interest     Patient Barriers: self-care deficit, substance abuse    Progress Toward Goals: ongoing    Recommended Treatment: discharge planning     Treatment Frequency: 1-2 times per week     Expected Discharge Date:     Discharge Plan: referrals as indicated     Treatment Plan Created/Updated By: Tran Knapp MD

## 2020-08-18 NOTE — PROGRESS NOTES
After going to sleep patient slept through night without incident  Patient not awoken for lipid panel due to Dr needing to review labs and because of extemely paranoid, uncooperative and delusional behavior  Will pass along to dayshift, and attempt after dr reviews labs  q7min checks maintained, will continue to monitor

## 2020-08-19 PROCEDURE — 99232 SBSQ HOSP IP/OBS MODERATE 35: CPT | Performed by: NURSE PRACTITIONER

## 2020-08-19 RX ORDER — HYDROXYZINE HYDROCHLORIDE 25 MG/1
50 TABLET, FILM COATED ORAL EVERY 4 HOURS PRN
Status: DISCONTINUED | OUTPATIENT
Start: 2020-08-19 | End: 2020-08-26 | Stop reason: HOSPADM

## 2020-08-19 RX ORDER — MAGNESIUM HYDROXIDE/ALUMINUM HYDROXICE/SIMETHICONE 120; 1200; 1200 MG/30ML; MG/30ML; MG/30ML
15 SUSPENSION ORAL EVERY 4 HOURS PRN
Status: DISCONTINUED | OUTPATIENT
Start: 2020-08-19 | End: 2020-08-26 | Stop reason: HOSPADM

## 2020-08-19 RX ORDER — BENZTROPINE MESYLATE 1 MG/ML
1 INJECTION INTRAMUSCULAR; INTRAVENOUS EVERY 6 HOURS PRN
Status: DISCONTINUED | OUTPATIENT
Start: 2020-08-19 | End: 2020-08-26 | Stop reason: HOSPADM

## 2020-08-19 RX ORDER — OLANZAPINE 10 MG/1
10 INJECTION, POWDER, LYOPHILIZED, FOR SOLUTION INTRAMUSCULAR
Status: DISCONTINUED | OUTPATIENT
Start: 2020-08-19 | End: 2020-08-26 | Stop reason: HOSPADM

## 2020-08-19 RX ORDER — HALOPERIDOL 5 MG
5 TABLET ORAL EVERY 6 HOURS PRN
Status: DISCONTINUED | OUTPATIENT
Start: 2020-08-19 | End: 2020-08-26 | Stop reason: HOSPADM

## 2020-08-19 RX ORDER — TRAZODONE HYDROCHLORIDE 50 MG/1
50 TABLET ORAL
Status: DISCONTINUED | OUTPATIENT
Start: 2020-08-19 | End: 2020-08-26 | Stop reason: HOSPADM

## 2020-08-19 RX ORDER — HYDROXYZINE HYDROCHLORIDE 25 MG/1
25 TABLET, FILM COATED ORAL EVERY 6 HOURS PRN
Status: DISCONTINUED | OUTPATIENT
Start: 2020-08-19 | End: 2020-08-26 | Stop reason: HOSPADM

## 2020-08-19 RX ORDER — BENZTROPINE MESYLATE 1 MG/1
1 TABLET ORAL EVERY 6 HOURS PRN
Status: DISCONTINUED | OUTPATIENT
Start: 2020-08-19 | End: 2020-08-26 | Stop reason: HOSPADM

## 2020-08-19 RX ORDER — ACETAMINOPHEN 325 MG/1
975 TABLET ORAL EVERY 6 HOURS PRN
Status: DISCONTINUED | OUTPATIENT
Start: 2020-08-19 | End: 2020-08-26 | Stop reason: HOSPADM

## 2020-08-19 RX ORDER — CLONAZEPAM 0.5 MG/1
0.5 TABLET ORAL 3 TIMES DAILY
Status: DISCONTINUED | OUTPATIENT
Start: 2020-08-19 | End: 2020-08-26 | Stop reason: HOSPADM

## 2020-08-19 RX ADMIN — CLONAZEPAM 0.5 MG: 0.5 TABLET ORAL at 09:20

## 2020-08-19 RX ADMIN — CLONAZEPAM 0.5 MG: 0.5 TABLET ORAL at 21:28

## 2020-08-19 RX ADMIN — NICOTINE 1 PATCH: 14 PATCH, EXTENDED RELEASE TRANSDERMAL at 09:20

## 2020-08-19 RX ADMIN — GABAPENTIN 600 MG: 300 CAPSULE ORAL at 13:32

## 2020-08-19 RX ADMIN — GABAPENTIN 600 MG: 300 CAPSULE ORAL at 09:20

## 2020-08-19 RX ADMIN — CLONAZEPAM 0.5 MG: 0.5 TABLET ORAL at 14:01

## 2020-08-19 RX ADMIN — ALBUTEROL SULFATE 2 PUFF: 90 AEROSOL, METERED RESPIRATORY (INHALATION) at 09:20

## 2020-08-19 RX ADMIN — GABAPENTIN 600 MG: 300 CAPSULE ORAL at 21:28

## 2020-08-19 RX ADMIN — FLUTICASONE PROPIONATE 1 SPRAY: 50 SPRAY, METERED NASAL at 09:20

## 2020-08-19 NOTE — PROGRESS NOTES
Progress Note - Behavioral Health     Jorge León 53 y o  female MRN: [id number]   Unit/Bed#: [facility] 251-01 Encounter: [id number]    Behavior over the last 24 hours:      Edinson Whaley was seen for an inpatient follow-up psychiatric visit this date  At today's visit, she was anxious, tangential, and hyperverbal   She relates she is feeling better  She is resistant to having any of her medications changed at this time  She occasionally yells out while in her room but her behavior remains otherwise controlled  She is taking her medications as prescribed and denies any side effects  She offers no insight regarding her alcoholism and methamphetamine abuse and continues to deny there is any problem  She remains somewhat somatic  ROS: no complaints, all other systems are negative    Mental Status Evaluation:    Appearance:  disheveled   Behavior:  pleasant, cooperative   Speech:  pressured, hypertalkative   Mood:  anxious, slightly manic   Affect:  labile   Thought Process:  tangential   Associations: intact associations   Thought Content:  mild paranoia   Perceptual Disturbances: auditory hallucinations still present, but less frequent   Risk Potential: Suicidal ideation - None at present  Homicidal ideation - None  Potential for aggression - No   Sensorium:  oriented to person, place and time/date   Memory:  recent and remote memory grossly intact   Consciousness:  alert and awake   Attention: poor concentration and poor attention span   Insight:  poor   Judgment: poor   Gait/Station: normal gait/station, normal balance   Motor Activity: no abnormal movements     Vital signs in last 24 hours:    Temp:  [97 7 °F (36 5 °C)-97 8 °F (36 6 °C)] 97 8 °F (36 6 °C)  HR:  [104-105] 105  Resp:  [18] 18  BP: (104-105)/(75-79) 104/75    Laboratory results:  I have personally reviewed all pertinent laboratory/tests results      Progress Toward Goals: poor insight, poor motivation    Assessment/Plan   Principal Problem:    Schizoaffective disorder, bipolar type ([facility] )  Active Problems:    LYNN (generalized anxiety disorder)    Medical clearance for psychiatric admission    Alcohol dependence with unspecified alcohol-induced disorder ([facility] )    Mild intermittent asthma without complication    Tobacco abuse    Recommended Treatment:     Continue current medications as prescribed  Continue to monitor  Discharge disposition and planning are ongoing      All current active medications have been reviewed  Encourage group therapy, milieu therapy and occupational therapy  Behavioral Health checks every 7 minutes    Current Facility-Administered Medications   Medication Dose Route Frequency Provider Last Rate    acetaminophen  650 mg Oral Q6H PRN Armani Galindo MD      acetaminophen  650 mg Oral Q4H PRN Armani Galindo MD      acetaminophen  975 mg Oral Q6H PRN Shelbie Lama, CRNP      albuterol  2 puff Inhalation Q4H PRN Bola Ellis PA-C      aluminum-magnesium hydroxide-simethicone  15 mL Oral Q4H PRN Isis Lama, CRNP      benztropine  1 mg Intramuscular Q6H PRN Isis Lama, CRNP      benztropine  1 mg Oral Q6H PRN Isis Lama, CRNP      clonazePAM  0 5 mg Oral TID Armani Galindo MD      fluticasone  1 spray Nasal Daily Bola Ellis PA-C      gabapentin  600 mg Oral TID Armani Galindo MD      haloperidol  5 mg Oral Q6H PRN Isis Lama, CRNP      haloperidol lactate  5 mg Intramuscular Q6H PRN Armani Galindo MD      hydrOXYzine HCL  25 mg Oral Q6H PRN Isis Lama, CRNP      hydrOXYzine HCL  50 mg Oral Q4H PRN Isis Lama, CRNP      LORazepam  2 mg Intramuscular Q6H PRN Armani Galindo MD      magnesium hydroxide  30 mL Oral Daily PRN Isis Lama, CRNP      nicotine  1 patch Transdermal Daily Armani Galindo MD      OLANZapine  10 mg Intramuscular Q3H PRN Isis Lama, CRNP      risperiDONE  2 mg Oral Q3H PRN Armani Galindo MD      traZODone  50 mg Oral HS PRN JARED Cohen         Risks / Benefits of Treatment:    Risks, benefits, and possible side effects of medications explained to patient and patient verbalizes understanding and agreement for treatment  Counseling / Coordination of Care:      Patient's progress discussed with staff in treatment team meeting  Medications, treatment progress and treatment plan reviewed with patient

## 2020-08-19 NOTE — PLAN OF CARE
Problem: Ineffective Coping  Goal: Cooperates with admission process  Description: Interventions:   - Complete admission process  Outcome: Progressing  Goal: Identifies ineffective coping skills  Outcome: Progressing  Goal: Identifies healthy coping skills  Outcome: Progressing  Goal: Demonstrates healthy coping skills  Outcome: Progressing  Goal: Participates in unit activities  Description: Interventions:  - Provide therapeutic environment   - Provide required programming   - Redirect inappropriate behaviors   Outcome: Progressing  Goal: Patient/Family participate in treatment and DC plans  Description: Interventions:  - Provide therapeutic environment  Outcome: Progressing  Goal: Patient/Family verbalizes awareness of resources  Outcome: Progressing  Goal: Understands least restrictive measures  Description: Interventions:  - Utilize least restrictive behavior  Outcome: Progressing  Goal: Free from restraint events  Description: - Utilize least restrictive measures   - Provide behavioral interventions   - Redirect inappropriate behaviors   Outcome: Progressing     Problem: Depression  Goal: Treatment Goal: Demonstrate behavioral control of depressive symptoms, verbalize feelings of improved mood/affect, and adopt new coping skills prior to discharge  Outcome: Progressing  Goal: Verbalize thoughts and feelings  Description: Interventions:  - Assess and re-assess patient's level of risk   - Engage patient in 1:1 interactions, daily, for a minimum of 15 minutes   - Encourage patient to express feelings, fears, frustrations, hopes   Outcome: Progressing  Goal: Refrain from harming self  Description: Interventions:  - Monitor patient closely, per order   - Supervise medication ingestion, monitor effects and side effects   Outcome: Progressing  Goal: Refrain from isolation  Description: Interventions:  - Develop a trusting relationship   - Encourage socialization   Outcome: Progressing  Goal: Refrain from self-neglect  Outcome: Progressing  Goal: Attend and participate in unit activities, including therapeutic, recreational, and educational groups  Description: Interventions:  - Provide therapeutic and educational activities daily, encourage attendance and participation, and document same in the medical record   Outcome: Progressing  Goal: Complete daily ADLs, including personal hygiene independently, as able  Description: Interventions:  - Observe, teach, and assist patient with ADLS  -  Monitor and promote a balance of rest/activity, with adequate nutrition and elimination   Outcome: Progressing     Problem: Anxiety  Goal: Anxiety is at manageable level  Description: Interventions:  - Assess and monitor patient's anxiety level  - Monitor for signs and symptoms (heart palpitations, chest pain, shortness of breath, headaches, nausea, feeling jumpy, restlessness, irritable, apprehensive)  - Collaborate with interdisciplinary team and initiate plan and interventions as ordered    - [place] patient to unit/surroundings  - Explain treatment plan  - Encourage participation in care  - Encourage verbalization of concerns/fears  - Identify coping mechanisms  - Assist in developing anxiety-reducing skills  - Administer/offer alternative therapies  - Limit or eliminate stimulants  Outcome: Progressing     Problem: SUBSTANCE USE/ABUSE  Goal: By discharge, will develop insight into their chemical dependency and sustain motivation to continue in recovery  Description: INTERVENTIONS:  - Attends all daily group sessions and scheduled AA groups  - Actively practices coping skills through participation in the therapeutic community and adherence to program rules  - Reviews and completes assignments from individual treatment plan  - Assist patient development of understanding of their personal cycle of addiction and relapse triggers  Outcome: Progressing  Goal: By discharge, patient will have ongoing treatment plan addressing chemical dependency  Description: INTERVENTIONS:  - Assist patient with resources and/or appointments for ongoing recovery based living  Outcome: Progressing     Problem: DISCHARGE PLANNING - CARE MANAGEMENT  Goal: Discharge to post-acute care or home with appropriate resources  Description: INTERVENTIONS:  - Conduct assessment to determine patient/family and health care team treatment goals, and need for post-acute services based on payer coverage, community resources, and patient preferences, and barriers to discharge  - Address psychosocial, clinical, and financial barriers to discharge as identified in assessment in conjunction with the patient/family and health care team  - Arrange appropriate level of post-acute services according to patients   needs and preference and payer coverage in collaboration with the physician and health care team  - Communicate with and update the patient/family, physician, and health care team regarding progress on the discharge plan  - Arrange appropriate transportation to post-acute venues  Outcome: Progressing

## 2020-08-19 NOTE — PROGRESS NOTES
08/19/20 0939   Team Meeting   Meeting Type Daily Rounds   Initial Conference Date 08/19/20   Patient/Family Present   Patient Present No   Patient's Family Present No     Team Members Present:   JARED Beth, RN  So Patterson PA-C  [facility]    [facility]  Gets upset about meds that she thinks she needs an increase  Will scream in room, at times  Thinks people are following her

## 2020-08-19 NOTE — PROGRESS NOTES
08/19/20 1000   Activity/Group Checklist   Group   (Creative Writing and Art Therapy Processing)   Attendance Attended   Attendance Duration (min) Greater than 60   Interactions Interacted appropriately   Affect/Mood Appropriate;Calm   Goals Achieved Identified feelings; Discussed coping strategies; Able to listen to others; Able to engage in interactions; Able to manage/cope with feelings

## 2020-08-19 NOTE — NURSING NOTE
Orin Chowdary had a few episodes of yelling out and responding to internal stimulae; she declined to take a PRN saying her Dr  said she shouldn't  She was quiet and remained in her room resting comfortably  Continue to monitor

## 2020-08-19 NOTE — PROGRESS NOTES
Pt remains visible on the unit  Pt denies si/hi at this time  Pt remains withdrawn to he room with the occasional yelling out  Pt has been seen using the phone appropriately during the day  Pt denies any current issues at this time

## 2020-08-19 NOTE — PROGRESS NOTES
08/19/20 3332   Activity/Group Checklist   Group   (Goal Setting and Communications)   Attendance Attended   Attendance Duration (min) 46-60   Interactions Interacted appropriately   Affect/Mood Appropriate   Goals Achieved Identified feelings; Identified triggers; Discussed coping strategies; Able to listen to others; Able to engage in interactions

## 2020-08-19 NOTE — PROGRESS NOTES
Patient calm quiet, at desk in am asking for soda to wake up  Pt agreeable to just have the coffee with morning group  Says " ok " when asked how she is feeling this morning  Will maintain on safety precautions and continual monitoring  No needs identified

## 2020-08-20 PROCEDURE — 99232 SBSQ HOSP IP/OBS MODERATE 35: CPT | Performed by: NURSE PRACTITIONER

## 2020-08-20 RX ADMIN — GABAPENTIN 600 MG: 300 CAPSULE ORAL at 08:36

## 2020-08-20 RX ADMIN — ACETAMINOPHEN 650 MG: 325 TABLET ORAL at 16:36

## 2020-08-20 RX ADMIN — NICOTINE 1 PATCH: 7 PATCH TRANSDERMAL at 16:11

## 2020-08-20 RX ADMIN — GABAPENTIN 600 MG: 300 CAPSULE ORAL at 12:30

## 2020-08-20 RX ADMIN — CLONAZEPAM 0.5 MG: 0.5 TABLET ORAL at 08:37

## 2020-08-20 RX ADMIN — FLUTICASONE PROPIONATE 1 SPRAY: 50 SPRAY, METERED NASAL at 08:36

## 2020-08-20 RX ADMIN — CLONAZEPAM 0.5 MG: 0.5 TABLET ORAL at 12:30

## 2020-08-20 NOTE — PLAN OF CARE
Problem: Anxiety  Goal: Anxiety is at manageable level  Description: Interventions:  - Assess and monitor patient's anxiety level  - Monitor for signs and symptoms (heart palpitations, chest pain, shortness of breath, headaches, nausea, feeling jumpy, restlessness, irritable, apprehensive)  - Collaborate with interdisciplinary team and initiate plan and interventions as ordered    - [place] patient to unit/surroundings  - Explain treatment plan  - Encourage participation in care  - Encourage verbalization of concerns/fears  - Identify coping mechanisms  - Assist in developing anxiety-reducing skills  - Administer/offer alternative therapies  - Limit or eliminate stimulants  Outcome: Progressing

## 2020-08-20 NOTE — PROGRESS NOTES
08/20/20 0730   Activity/Group Checklist   Group   (Goal Setting and Communications)   Attendance Attended   Attendance Duration (min) 46-60   Interactions Interacted appropriately   Affect/Mood Appropriate;Calm   Goals Achieved Identified feelings; Discussed coping strategies; Able to listen to others; Able to engage in interactions

## 2020-08-20 NOTE — PROGRESS NOTES
08/20/20 0943   Team Meeting   Meeting Type Daily Rounds   Initial Conference Date 08/20/20   Patient/Family Present   Patient Present No   Patient's Family Present No     Team Members Present:   MD Nadine Carrillo, JARED Woodall, RN  [facility], [facility]    Remains explosive  Delusional   Sleep difficulties  Declines med changes  On 3-month injectable  Homeless

## 2020-08-20 NOTE — PROGRESS NOTES
Pt remains visible on the unit during the day  Pt has been able to address her needs to staff  Pt has been medication and meal compliant  Pt has been maintaining her hygiene at this time

## 2020-08-20 NOTE — PROGRESS NOTES
Progress Note - Behavioral Health     Waylon Jacobson 53 y o  female MRN: [id number]   Unit/Bed#: U 251-01 Encounter: [id number]    Behavior over the last 24 hours:      Tierney Perez was seen for an inpatient follow-up psychiatric visit this date  She remains labile with occasional verbal outbursts  She states she did not sleep last night because she was hearing voices  She relates turning the lied on in her room helped  She does not wish to change any of her medications  She also does not wish to take any p r n  Medications  She lacks insight  She wishes to be discharged to a group home but does not meet criteria  She has not had any physically aggressive behavior on the unit  She still complains of anxiety  ROS: no complaints, all other systems are negative    Mental Status Evaluation:    Appearance:  disheveled   Behavior:  pleasant   Speech:  pressured, hypertalkative   Mood:  anxious, labile, somewhat manic   Affect:  reactive   Thought Process:  tangential, perseverative   Associations: intact associations   Thought Content:  no overt delusions   Perceptual Disturbances: auditory hallucinations   Risk Potential: Suicidal ideation - None  Homicidal ideation - None  Potential for aggression - No   Sensorium:  oriented to person, place and time/date   Memory:  recent and remote memory grossly intact   Consciousness:  alert and awake   Attention: poor concentration and poor attention span   Insight:  poor   Judgment: poor   Gait/Station: normal gait/station, normal balance   Motor Activity: no abnormal movements     Vital signs in last 24 hours:    Temp:  [97 1 °F (36 2 °C)-97 8 °F (36 6 °C)] 97 8 °F (36 6 °C)  HR:  [] 106  Resp:  [16-18] 16  BP: ()/(51-79) 97/51    Laboratory results:  I have personally reviewed all pertinent laboratory/tests results      Progress Toward Goals: poor insight, poor motivation, poor reality testing    Assessment/Plan   Principal Problem:    Schizoaffective disorder, bipolar type ([facility] )  Active Problems:    LYNN (generalized anxiety disorder)    Medical clearance for psychiatric admission    Alcohol dependence with unspecified alcohol-induced disorder ([facility] )    Mild intermittent asthma without complication    Tobacco abuse    Recommended Treatment:     Continue current medications as prescribed since patient refuses any medication changes at this time  Continue to monitor  Discharge disposition and planning are ongoing      All current active medications have been reviewed  Encourage group therapy, milieu therapy and occupational therapy  Behavioral Health checks every 7 minutes    Current Facility-Administered Medications   Medication Dose Route Frequency Provider Last Rate    acetaminophen  650 mg Oral Q6H PRN Beatriz Nair MD      acetaminophen  650 mg Oral Q4H PRN Beatriz Nair MD      acetaminophen  975 mg Oral Q6H PRN Hakan Lama, CRNP      albuterol  2 puff Inhalation Q4H PRN Fredi Sanchez PA-C      aluminum-magnesium hydroxide-simethicone  15 mL Oral Q4H PRN Isis Lama, CRNP      benztropine  1 mg Intramuscular Q6H PRN Isis Lama, CRNP      benztropine  1 mg Oral Q6H PRN Isis Lama, CRNP      clonazePAM  0 5 mg Oral TID Beatriz Nair MD      fluticasone  1 spray Nasal Daily Fredi Sanchez PA-C      gabapentin  600 mg Oral TID Beatriz Nair MD      haloperidol  5 mg Oral Q6H PRN Isis Lama, CRNP      haloperidol lactate  5 mg Intramuscular Q6H PRN Beatriz Nair MD      hydrOXYzine HCL  25 mg Oral Q6H PRN Isis Lama, CRNP      hydrOXYzine HCL  50 mg Oral Q4H PRN Isis Lama, CRNP      LORazepam  2 mg Intramuscular Q6H PRN Beatriz Nair MD      magnesium hydroxide  30 mL Oral Daily PRN Isis Lama, CRNP      nicotine  1 patch Transdermal Daily Beatriz Nair MD      OLANZapine  10 mg Intramuscular Q3H PRN Isis Lama, RENATANP      risperiDONE  2 mg Oral Q3H PRN Trish Houston MD Arley      traZODone  50 mg Oral HS PRN JARED Jones         Risks / Benefits of Treatment:    Risks, benefits, and possible side effects of medications explained to patient and patient verbalizes understanding and agreement for treatment  Counseling / Coordination of Care:      Patient's progress discussed with staff in treatment team meeting  Medications, treatment progress and treatment plan reviewed with patient

## 2020-08-20 NOTE — PROGRESS NOTES
08/20/20 1000   Activity/Group Checklist   Group   (Personal Strengths and Art Therapy Processing)   Attendance Did not attend  (AT group offered, pt elected to remain in room)

## 2020-08-20 NOTE — PROGRESS NOTES
Pt present in milieu for breakfast  Affect is flat  Mood is depressed  Pt states she did not sleep well last night and her, "anxiety was bad " Currently anxiety and depression are low  Denies SI/HI/AH/VH  Pt returned to room after breakfast  Compliant with medications and wanted nicotine patch after shower later today  No acute behaviors noted  Q 7 min safety checks maintained

## 2020-08-20 NOTE — NURSING NOTE
Harris had 2 episodes of yelling out loudly during the night and again refused a PRN for anxiety  Continue to monitor for safety

## 2020-08-21 PROCEDURE — 99232 SBSQ HOSP IP/OBS MODERATE 35: CPT | Performed by: NURSE PRACTITIONER

## 2020-08-21 RX ADMIN — CLONAZEPAM 0.5 MG: 0.5 TABLET ORAL at 08:43

## 2020-08-21 RX ADMIN — CLONAZEPAM 0.5 MG: 0.5 TABLET ORAL at 00:09

## 2020-08-21 RX ADMIN — GABAPENTIN 600 MG: 300 CAPSULE ORAL at 08:44

## 2020-08-21 RX ADMIN — GABAPENTIN 600 MG: 300 CAPSULE ORAL at 14:30

## 2020-08-21 RX ADMIN — CLONAZEPAM 0.5 MG: 0.5 TABLET ORAL at 14:30

## 2020-08-21 RX ADMIN — NICOTINE 1 PATCH: 7 PATCH TRANSDERMAL at 08:44

## 2020-08-21 RX ADMIN — GABAPENTIN 600 MG: 300 CAPSULE ORAL at 00:09

## 2020-08-21 RX ADMIN — CLONAZEPAM 0.5 MG: 0.5 TABLET ORAL at 20:07

## 2020-08-21 RX ADMIN — FLUTICASONE PROPIONATE 1 SPRAY: 50 SPRAY, METERED NASAL at 08:44

## 2020-08-21 RX ADMIN — ALBUTEROL SULFATE 2 PUFF: 90 AEROSOL, METERED RESPIRATORY (INHALATION) at 05:29

## 2020-08-21 RX ADMIN — GABAPENTIN 600 MG: 300 CAPSULE ORAL at 20:07

## 2020-08-21 NOTE — PROGRESS NOTES
Patient had slept through HS med pass  Upon waking at [address], patient requested medications and they were administered  She then returned to bed and appears to be sleeping comfortably thus far  No behaviors or complaints  Will continue monitoring

## 2020-08-21 NOTE — PROGRESS NOTES
Progress Note - Behavioral Health     Cornelius Milan 53 y o  female MRN: [id number]   Unit/Bed#: [facility] 251-01 Encounter: [id number]    Behavior over the last 24 hours:      Li Ramirez was seen for an inpatient follow-up psychiatric visit this date  She remains labile and verbally explosive at times  At today's visit, she was paranoid, delusional, and floridly psychotic  She believes people are placing medication in her food  She believes she is being abused and followed by staff and peers  She was screaming and waving her arms  She refuses any medication changes and will not accept any p r n  Medication  She endorses auditory hallucinations at times  She demanded to leave immediately  She was eventually redirected to her room  She only remains calm for short periods of time      ROS: no complaints, all other systems are negative    Mental Status Evaluation:    Appearance:  disheveled   Behavior:  agitated, angry, Unpredictable   Speech:  pressured, hypertalkative, Frequent screaming   Mood:  anxious, labile, irritable   Affect:  expansive   Thought Process:  disorganized, tangential, racing of thoughts, perseverative   Associations: tangential associations   Thought Content:  persecutory and fixed delusions, paranoid ideation   Perceptual Disturbances: auditory hallucinations   Risk Potential: Suicidal ideation - None at present  Homicidal ideation - angry, hostile feelings with no homicidal plan  Potential for aggression - No   Sensorium:  oriented to person, place and time/date   Memory:  recent and remote memory grossly intact   Consciousness:  alert and awake   Attention: poor concentration and poor attention span   Insight:  poor   Judgment: poor   Gait/Station: normal gait/station, normal balance   Motor Activity: no abnormal movements     Vital signs in last 24 hours:    Temp:  [97 7 °F (36 5 °C)-98 6 °F (37 °C)] 98 6 °F (37 °C)  HR:  [70-85] 85  Resp:  [16-18] 18  BP: (110-124)/(69-74) 110/69    Laboratory results:  I have personally reviewed all pertinent laboratory/tests results  Progress Toward Goals: poor insight    Assessment/Plan   Principal Problem:    Schizoaffective disorder, bipolar type ([facility])  Active Problems:    LYNN (generalized anxiety disorder)    Medical clearance for psychiatric admission    Alcohol dependence with unspecified alcohol-induced disorder ([facility] )    Mild intermittent asthma without complication    Tobacco abuse    Recommended Treatment:     Continue current medications as patient will not agree to any changes  Continue to monitor  Discharge disposition and planning are ongoing      All current active medications have been reviewed  Encourage group therapy, milieu therapy and occupational therapy  Behavioral Health checks every 7 minutes    Current Facility-Administered Medications   Medication Dose Route Frequency Provider Last Rate    acetaminophen  650 mg Oral Q6H PRN Lakeshia Corky, MD      acetaminophen  650 mg Oral Q4H PRN Lakeshia Corky, MD      acetaminophen  975 mg Oral Q6H PRN Suzy Lama, RENATANP      albuterol  2 puff Inhalation Q4H PRN Rios Pekierra, PA-LENNOX      aluminum-magnesium hydroxide-simethicone  15 mL Oral Q4H PRN Isis Lama, RENATANP      benztropine  1 mg Intramuscular Q6H PRN Isis Lama, CRNP      benztropine  1 mg Oral Q6H PRN Isis Lama, CRNP      clonazePAM  0 5 mg Oral TID Lakeshia Fried MD      fluticasone  1 spray Nasal Daily Riosalexa Briggs PA-C      gabapentin  600 mg Oral TID Lakeshia Fried MD      haloperidol  5 mg Oral Q6H PRN sIis Lama, CRNP      haloperidol lactate  5 mg Intramuscular Q6H PRN Lakeshia Fried MD      hydrOXYzine HCL  25 mg Oral Q6H PRN Isis Porterics, CRNP      hydrOXYzine HCL  50 mg Oral Q4H PRN Isis Lama, RENATANP      LORazepam  2 mg Intramuscular Q6H PRN Lakeshia Fried MD      magnesium hydroxide  30 mL Oral Daily PRN Suzy Lama, RENATANP      nicotine  1 patch Transdermal Daily Armani Galindo MD      OLANZapine  10 mg Intramuscular Q3H PRN JARED Jones      risperiDONE  2 mg Oral Q3H PRN Armani Galindo MD      traZODone  50 mg Oral HS PRN JARED Jones         Risks / Benefits of Treatment:    Risks, benefits, and possible side effects of medications explained to patient and patient verbalizes understanding and agreement for treatment  Counseling / Coordination of Care:      Patient's progress discussed with staff in treatment team meeting  Medications, treatment progress and treatment plan reviewed with patient

## 2020-08-21 NOTE — PROGRESS NOTES
Patient cooperative with blood work this morning; lab was unsuccessful  Patient became anxious and tearful, and blood work was stopped  Patient also did request to have a venereal disease test ordered  Informed patient that I would pass it along to her RN and I would hold off on the blood work until the other test gets ordered  Will pass onto the next shift  RN made aware

## 2020-08-21 NOTE — PROGRESS NOTES
08/21/20 1000   Activity/Group Checklist   Group   (Creative Expression and Art Therapy Processing)   Attendance Attended   Attendance Duration (min) 46-60   Interactions Interacted appropriately   Affect/Mood Appropriate   Goals Achieved Identified feelings; Identified triggers; Discussed coping strategies; Able to listen to others; Able to engage in interactions

## 2020-08-21 NOTE — PROGRESS NOTES
Has been calm and controlled since waking  Interacting appropriately with staff  Walking the halls  Cooperative with am blood work which was unsuccessful  Lipid panel still required  Pt also requesting STD testing   No acute concerns noted this shift

## 2020-08-21 NOTE — PROGRESS NOTES
08/21/20 0948   Team Meeting   Meeting Type Daily Rounds   Initial Conference Date 08/21/20   Patient/Family Present   Patient Present No   Patient's Family Present No       Team Members Present:   JARED Whaley, RN  [place], [place]    [facility]   Thoughts more clear last night  Likely will need a shelter

## 2020-08-21 NOTE — PROGRESS NOTES
Pt remains visible on the unit during medication and meal times  Pt has been pacing the hallway  Pt denies current si/hi at this time  Pt has been slightly irritable during the day

## 2020-08-21 NOTE — PROGRESS NOTES
Pt quickly became agitated while walking the halls, talking out loud to self  Began cursing  Then yelling at staff about the people trying to control her  In redirecting patient to her room, pt agreed to process through thoughts  Pt denies hallucinations but refers to her experience as "the aftermath" of what has happened to me in life  Later refers to her struggles with PTSD  Much of what patient yells about is people wanting her to be perfect with "the right" hair, makeup, weight etc  Pt apologized for her anger and yelling but states, "this is everywhere I am, this is my everyday"  Pt quickly calmed and informed invega has helped her with her anger  Declined offered PRN informing she prefers to stick to her walking regimen in the am, breakfast, then scheduled medications  Validation and positive reinforcement offered  Pt agreeable to having staff tell her when she appears to be getting agitated, so that she can "cool down" in her room  Pt thankful for discussion and again apologized  Returned to walking in the halls calmly a moment later

## 2020-08-21 NOTE — PROGRESS NOTES
Woke at approx 0500  Talked with this writer at the nurse's station  Pt presents calm, controlled and focused  Thoughts clear and organized  Discussed situation and plan/hopes for discharge planning  Informs her living arrangements and affiliations have negatively affected her progress  Speaks positively about support from her ACT team and effectiveness of q 3mo invega sustenna injection which is due next late Sep/early Oct  Identifies goal of discharging to a more controlled/supportive environment and states her CM is aware  Pt denies acute concerns and agrees to make needs known  PRN albuterol administered by patient request for wheezing at 0529  Immediately effective

## 2020-08-21 NOTE — PROGRESS NOTES
08/21/20 0900   Activity/Group Checklist   Group Other (Comment)  (Goals Group/Coffee )   Attendance Attended   Attendance Duration (min) 31-45   Interactions Did not interact   Affect/Mood Constricted;ASHLEY   Goals Achieved Able to listen to others

## 2020-08-22 PROCEDURE — 99232 SBSQ HOSP IP/OBS MODERATE 35: CPT | Performed by: NURSE PRACTITIONER

## 2020-08-22 RX ADMIN — CLONAZEPAM 0.5 MG: 0.5 TABLET ORAL at 09:08

## 2020-08-22 RX ADMIN — GABAPENTIN 600 MG: 300 CAPSULE ORAL at 21:09

## 2020-08-22 RX ADMIN — GABAPENTIN 600 MG: 300 CAPSULE ORAL at 09:00

## 2020-08-22 RX ADMIN — GABAPENTIN 600 MG: 300 CAPSULE ORAL at 13:53

## 2020-08-22 RX ADMIN — CLONAZEPAM 0.5 MG: 0.5 TABLET ORAL at 13:53

## 2020-08-22 RX ADMIN — FLUTICASONE PROPIONATE 1 SPRAY: 50 SPRAY, METERED NASAL at 09:08

## 2020-08-22 RX ADMIN — ALBUTEROL SULFATE 2 PUFF: 90 AEROSOL, METERED RESPIRATORY (INHALATION) at 13:54

## 2020-08-22 RX ADMIN — CLONAZEPAM 0.5 MG: 0.5 TABLET ORAL at 21:09

## 2020-08-22 NOTE — PROGRESS NOTES
Pt reports feeling tired this morning and reported difficulty falling asleep last night  No c/o pain  Compliant with medications  Out for breakfast, but returned to her room to lay down afterwards  No verbal outbursts at this time  Pt did not say anything paranoid or delusional during our initial interaction  Pt was polite and pleasant  Denies hallucinations  Denies SI/HI at this time  Cooperative with staff  Will continue to monitor and assess

## 2020-08-22 NOTE — PROGRESS NOTES
Pt remains labile on the unit, pt however is redirectable when this nurse talks with her  Pt remains medication and meal compliant  Pt denies any additional issues at this time

## 2020-08-22 NOTE — PROGRESS NOTES
Mood lability this afternoon  Rapid mood shifts  Pt will be pleasant and polite and then seconds later can be heard screaming and cursing from her room  Pt still remains disorganized with her thoughts  Still paranoid and delusional   Pt stating that people are after her and that we all want her dead  Reports feeling sick today and has been in her room laying down for most of the day  Pt states she's having "a pancreas flare up, gallbladder issues, and spots on her lungs  "

## 2020-08-22 NOTE — PROGRESS NOTES
Progress Note - Behavioral Health     Cornelius Milan 53 y o  female MRN: [id number]   Unit/Bed#: U 251-01 Encounter: [id number]    Behavior over the last 24 hours:      Li Ramirez was seen for an inpatient follow-up psychiatric visit this date  She remains labile, paranoid and delusional   She continues to refuse any medication changes  She is disheveled  She is tangential and states the only reason she is in the hospital is because everybody in her life has been cruel to her  She offers no insight and is resistant to treatment  Today, she has stayed in her room and states she is not feeling well    She believes her medication is mixing with my blood and making me sick      ROS: no complaints, all other systems are negative    Mental Status Evaluation:    Appearance:  disheveled   Behavior:  agitated, bizarre, uncooperative   Speech:  pressured, hypertalkative   Mood:  labile, irritable   Affect:  reactive   Thought Process:  disorganized, illogical, tangential, perseverative   Associations: tangential associations   Thought Content:  persecutory and fixed delusions, racing thoughts, paranoid ideation   Perceptual Disturbances: appears responding to internal stimuli, appears preoccupied, denies when asked, but talks to self at times   Risk Potential: Suicidal ideation - None  Homicidal ideation - angry, hostile feelings with no homicidal plan  Potential for aggression - No   Sensorium:  oriented to person and place   Memory:  recent and remote memory grossly intact   Consciousness:  alert and awake   Attention: poor concentration and poor attention span   Insight:  poor   Judgment: poor   Gait/Station: normal gait/station, normal balance   Motor Activity: no abnormal movements     Vital signs in last 24 hours:    Temp:  [97 6 °F (36 4 °C)] 97 6 °F (36 4 °C)  HR:  [58-73] 58  Resp:  [16-18] 16  BP: (112-123)/(64-68) 112/64    Laboratory results:  I have personally reviewed all pertinent laboratory/tests results  Progress Toward Goals: no improvement    Assessment/Plan   Principal Problem:    Schizoaffective disorder, bipolar type (HCC)  Active Problems:    LYNN (generalized anxiety disorder)    Medical clearance for psychiatric admission    Alcohol dependence with unspecified alcohol-induced disorder ([facility] [address] )    Mild intermittent asthma without complication    Tobacco abuse    Recommended Treatment:     Continue current medications  Continue to monitor  Discharge disposition and planning are ongoing      All current active medications have been reviewed  Encourage group therapy, milieu therapy and occupational therapy  Behavioral Health checks every 7 minutes    Current Facility-Administered Medications   Medication Dose Route Frequency Provider Last Rate    acetaminophen  650 mg Oral Q6H PRN Radha Schwartz MD      acetaminophen  650 mg Oral Q4H PRN Radha Schwartz MD      acetaminophen  975 mg Oral Q6H PRN Rodriguez Forte Daina, JARED      albuterol  2 puff Inhalation Q4H PRN Kimo Munguia PA-C      aluminum-magnesium hydroxide-simethicone  15 mL Oral Q4H PRN Isis Lama, JARED      benztropine  1 mg Intramuscular Q6H PRN Isis Lama, JARED      benztropine  1 mg Oral Q6H PRN Isis Lama, JARED      clonazePAM  0 5 mg Oral TID Radha Schwartz MD      fluticasone  1 spray Nasal Daily Kimo Munguia PA-C      gabapentin  600 mg Oral TID Radha Schwartz MD      haloperidol  5 mg Oral Q6H PRN Isis Lama, RENATANP      haloperidol lactate  5 mg Intramuscular Q6H PRN Radha Schwartz MD      hydrOXYzine HCL  25 mg Oral Q6H PRN Isis Lama, CRNP      hydrOXYzine HCL  50 mg Oral Q4H PRN Isis Lama, RENATANP      LORazepam  2 mg Intramuscular Q6H PRN Radha Schwartz MD      magnesium hydroxide  30 mL Oral Daily PRN Isis Lama, RENATANP      nicotine  1 patch Transdermal Daily Radha Schwartz MD      OLANZapine  10 mg Intramuscular Q3H PRN JARED Ferguson  risperiDONE  2 mg Oral Q3H PRN Marta Rai MD      traZODone  50 mg Oral HS PRN JARED Jones         Risks / Benefits of Treatment:    Risks, benefits, and possible side effects of medications explained to patient and patient verbalizes understanding and agreement for treatment  Counseling / Coordination of Care:      Patient's progress discussed with staff in treatment team meeting  Medications, treatment progress and treatment plan reviewed with patient

## 2020-08-22 NOTE — PROGRESS NOTES
Period of yelling in room  Refused any PRN medications  Mood labile, going from angry outburst towards staff, then pleasant and positive toward staff  No aggressive acting out  Slept from 2100 to 0100, then would rest but was restless  Maintained on q 7 minute safety checks

## 2020-08-22 NOTE — PLAN OF CARE
Problem: Ineffective Coping  Goal: Cooperates with admission process  Description: Interventions:   - Complete admission process  Outcome: Completed  Goal: Identifies ineffective coping skills  Outcome: Progressing  Goal: Identifies healthy coping skills  Outcome: Progressing  Goal: Demonstrates healthy coping skills  Outcome: Progressing  Goal: Participates in unit activities  Description: Interventions:  - Provide therapeutic environment   - Provide required programming   - Redirect inappropriate behaviors   Outcome: Completed  Goal: Patient/Family participate in treatment and DC plans  Description: Interventions:  - Provide therapeutic environment  Outcome: Progressing  Goal: Patient/Family verbalizes awareness of resources  Outcome: Progressing  Goal: Understands least restrictive measures  Description: Interventions:  - Utilize least restrictive behavior  Outcome: Progressing  Goal: Free from restraint events  Description: - Utilize least restrictive measures   - Provide behavioral interventions   - Redirect inappropriate behaviors   Outcome: Progressing     Problem: Depression  Goal: Verbalize thoughts and feelings  Description: Interventions:  - Assess and re-assess patient's level of risk   - Engage patient in 1:1 interactions, daily, for a minimum of 15 minutes   - Encourage patient to express feelings, fears, frustrations, hopes   Outcome: Completed  Goal: Refrain from harming self  Description: Interventions:  - Monitor patient closely, per order   - Supervise medication ingestion, monitor effects and side effects   Outcome: Progressing  Goal: Refrain from isolation  Description: Interventions:  - Develop a trusting relationship   - Encourage socialization   Outcome: Progressing  Goal: Attend and participate in unit activities, including therapeutic, recreational, and educational groups  Description: Interventions:  - Provide therapeutic and educational activities daily, encourage attendance and participation, and document same in the medical record   Outcome: Progressing  Goal: Complete daily ADLs, including personal hygiene independently, as able  Description: Interventions:  - Observe, teach, and assist patient with ADLS  -  Monitor and promote a balance of rest/activity, with adequate nutrition and elimination   Outcome: Completed     Problem: Anxiety  Goal: Anxiety is at manageable level  Description: Interventions:  - Assess and monitor patient's anxiety level  - Monitor for signs and symptoms (heart palpitations, chest pain, shortness of breath, headaches, nausea, feeling jumpy, restlessness, irritable, apprehensive)  - Collaborate with interdisciplinary team and initiate plan and interventions as ordered    - [place] patient to unit/surroundings  - Explain treatment plan  - Encourage participation in care  - Encourage verbalization of concerns/fears  - Identify coping mechanisms  - Assist in developing anxiety-reducing skills  - Administer/offer alternative therapies  - Limit or eliminate stimulants  Outcome: Progressing     Problem: SUBSTANCE USE/ABUSE  Goal: By discharge, will develop insight into their chemical dependency and sustain motivation to continue in recovery  Description: INTERVENTIONS:  - Attends all daily group sessions and scheduled AA groups  - Actively practices coping skills through participation in the therapeutic community and adherence to program rules  - Reviews and completes assignments from individual treatment plan  - Assist patient development of understanding of their personal cycle of addiction and relapse triggers  Outcome: Progressing  Goal: By discharge, patient will have ongoing treatment plan addressing chemical dependency  Description: INTERVENTIONS:  - Assist patient with resources and/or appointments for ongoing recovery based living  Outcome: Progressing     Problem: DISCHARGE PLANNING - CARE MANAGEMENT  Goal: Discharge to post-acute care or home with appropriate resources  Description: INTERVENTIONS:  - Conduct assessment to determine patient/family and health care team treatment goals, and need for post-acute services based on payer coverage, community resources, and patient preferences, and barriers to discharge  - Address psychosocial, clinical, and financial barriers to discharge as identified in assessment in conjunction with the patient/family and health care team  - Arrange appropriate level of post-acute services according to patients   needs and preference and payer coverage in collaboration with the physician and health care team  - Communicate with and update the patient/family, physician, and health care team regarding progress on the discharge plan  - Arrange appropriate transportation to post-acute venues  Outcome: Progressing

## 2020-08-23 LAB
CHOLEST SERPL-MCNC: 220 MG/DL (ref 0–200)
HDLC SERPL-MCNC: 73 MG/DL
LDLC SERPL CALC-MCNC: 103 MG/DL (ref 0–100)
NONHDLC SERPL-MCNC: 147 MG/DL
TRIGL SERPL-MCNC: 218 MG/DL (ref 44–166)

## 2020-08-23 PROCEDURE — 99232 SBSQ HOSP IP/OBS MODERATE 35: CPT | Performed by: NURSE PRACTITIONER

## 2020-08-23 PROCEDURE — 80061 LIPID PANEL: CPT | Performed by: PSYCHIATRY & NEUROLOGY

## 2020-08-23 RX ORDER — GABAPENTIN 400 MG/1
800 CAPSULE ORAL 3 TIMES DAILY
Status: DISCONTINUED | OUTPATIENT
Start: 2020-08-23 | End: 2020-08-26 | Stop reason: HOSPADM

## 2020-08-23 RX ORDER — LIDOCAINE 50 MG/G
1 PATCH TOPICAL DAILY
Status: DISCONTINUED | OUTPATIENT
Start: 2020-08-23 | End: 2020-08-26 | Stop reason: HOSPADM

## 2020-08-23 RX ADMIN — CLONAZEPAM 0.5 MG: 0.5 TABLET ORAL at 20:59

## 2020-08-23 RX ADMIN — GLYCERIN, PETROLATUM, PHENYLEPHRINE HCL, PRAMOXINE HCL: 144; 2.5; 10; 15 CREAM TOPICAL at 14:55

## 2020-08-23 RX ADMIN — CLONAZEPAM 0.5 MG: 0.5 TABLET ORAL at 09:01

## 2020-08-23 RX ADMIN — CLONAZEPAM 0.5 MG: 0.5 TABLET ORAL at 13:35

## 2020-08-23 RX ADMIN — ALBUTEROL SULFATE 2 PUFF: 90 AEROSOL, METERED RESPIRATORY (INHALATION) at 07:53

## 2020-08-23 RX ADMIN — LORAZEPAM 2 MG: 2 INJECTION INTRAMUSCULAR; INTRAVENOUS at 16:19

## 2020-08-23 RX ADMIN — FLUTICASONE PROPIONATE 1 SPRAY: 50 SPRAY, METERED NASAL at 08:12

## 2020-08-23 RX ADMIN — GABAPENTIN 800 MG: 400 CAPSULE ORAL at 20:59

## 2020-08-23 RX ADMIN — LIDOCAINE 1 PATCH: 50 PATCH CUTANEOUS at 14:00

## 2020-08-23 RX ADMIN — GABAPENTIN 800 MG: 400 CAPSULE ORAL at 12:35

## 2020-08-23 RX ADMIN — GABAPENTIN 600 MG: 300 CAPSULE ORAL at 07:53

## 2020-08-23 RX ADMIN — LORAZEPAM 2 MG: 2 INJECTION INTRAMUSCULAR; INTRAVENOUS at 06:04

## 2020-08-23 NOTE — NURSING NOTE
Galilea Tsai had awoken in a calm mood and then suddenly she became loud, yelling and responding to internal stimulae  She took a shower and was continuing to yell while in the shower and continued while in her room after  She refuses PRN meds  Continue to monitor for safety/mood/behavior

## 2020-08-23 NOTE — PLAN OF CARE
Problem: Ineffective Coping  Goal: Identifies ineffective coping skills  Outcome: Progressing  Goal: Identifies healthy coping skills  Outcome: Progressing  Goal: Demonstrates healthy coping skills  Outcome: Progressing  Goal: Patient/Family participate in treatment and DC plans  Description: Interventions:  - Provide therapeutic environment  Outcome: Progressing  Goal: Patient/Family verbalizes awareness of resources  Outcome: Progressing  Goal: Understands least restrictive measures  Description: Interventions:  - Utilize least restrictive behavior  Outcome: Progressing  Goal: Free from restraint events  Description: - Utilize least restrictive measures   - Provide behavioral interventions   - Redirect inappropriate behaviors   Outcome: Progressing     Problem: Depression  Goal: Refrain from harming self  Description: Interventions:  - Monitor patient closely, per order   - Supervise medication ingestion, monitor effects and side effects   Outcome: Progressing  Goal: Refrain from isolation  Description: Interventions:  - Develop a trusting relationship   - Encourage socialization   Outcome: Completed  Goal: Attend and participate in unit activities, including therapeutic, recreational, and educational groups  Description: Interventions:  - Provide therapeutic and educational activities daily, encourage attendance and participation, and document same in the medical record   Outcome: Progressing     Problem: Anxiety  Goal: Anxiety is at manageable level  Description: Interventions:  - Assess and monitor patient's anxiety level  - Monitor for signs and symptoms (heart palpitations, chest pain, shortness of breath, headaches, nausea, feeling jumpy, restlessness, irritable, apprehensive)  - Collaborate with interdisciplinary team and initiate plan and interventions as ordered    - [place] patient to unit/surroundings  - Explain treatment plan  - Encourage participation in care  - Encourage verbalization of concerns/fears  - Identify coping mechanisms  - Assist in developing anxiety-reducing skills  - Administer/offer alternative therapies  - Limit or eliminate stimulants  Outcome: Progressing     Problem: SUBSTANCE USE/ABUSE  Goal: By discharge, will develop insight into their chemical dependency and sustain motivation to continue in recovery  Description: INTERVENTIONS:  - Attends all daily group sessions and scheduled AA groups  - Actively practices coping skills through participation in the therapeutic community and adherence to program rules  - Reviews and completes assignments from individual treatment plan  - Assist patient development of understanding of their personal cycle of addiction and relapse triggers  Outcome: Progressing  Goal: By discharge, patient will have ongoing treatment plan addressing chemical dependency  Description: INTERVENTIONS:  - Assist patient with resources and/or appointments for ongoing recovery based living  Outcome: Progressing     Problem: DISCHARGE PLANNING - CARE MANAGEMENT  Goal: Discharge to post-acute care or home with appropriate resources  Description: INTERVENTIONS:  - Conduct assessment to determine patient/family and health care team treatment goals, and need for post-acute services based on payer coverage, community resources, and patient preferences, and barriers to discharge  - Address psychosocial, clinical, and financial barriers to discharge as identified in assessment in conjunction with the patient/family and health care team  - Arrange appropriate level of post-acute services according to patients   needs and preference and payer coverage in collaboration with the physician and health care team  - Communicate with and update the patient/family, physician, and health care team regarding progress on the discharge plan  - Arrange appropriate transportation to post-acute venues  Outcome: Progressing

## 2020-08-23 NOTE — NURSING NOTE
Hayden Snyder had been calm and cooperative the entire night; she stated that she slept very well  She was cooperative with her Lab draw this morning  Continue to monitor for safety/mood/behavior

## 2020-08-23 NOTE — PROGRESS NOTES
Pt's mood lability increases throughout the day  Pt manic, yelling, screaming, cursing  Talking/yelling to herself in the hallway  Delusional   Paranoid  Becoming easily agitated and is visibly anxious  Pt stating that "these people are after her "  When pt was asked who was after her she screams "I don't know "  Pt was unable to relax and was extremely restless  Pt requesting Ativan injection and reports that it helped her relax this morning  Klonopin has not been effective  PRN Ativan 2 mg IM given in L deltoid @ 1815  Tolerated well

## 2020-08-23 NOTE — NURSING NOTE
The agitation continued to escalate and then Ativan 2 mg IM was given PRN in right deltoid  We will continue to monitor and support

## 2020-08-23 NOTE — PROGRESS NOTES
Pt's mood remains labile  Pt states that she feels ready to go, but she is still showing mood instability  Pt received PRN Ativan this morning and reports that it "helped her calm down "  Pt states that she wants to switch the Klonopin to Ativan and feels that the Ativan is working better for her  No c/o pain  Compliant with medications and meals  Pt states that she always feels tired, but it was reported she did not sleep well last night  Pt did have a verbal outburst this morning, but has remained controlled since  Denies hallucinations  Denies SI/HI  Cooperative with staff at this time  Will continue to monitor and assess

## 2020-08-24 PROCEDURE — 99233 SBSQ HOSP IP/OBS HIGH 50: CPT | Performed by: NURSE PRACTITIONER

## 2020-08-24 RX ORDER — LORAZEPAM 1 MG/1
1 TABLET ORAL ONCE
Status: COMPLETED | OUTPATIENT
Start: 2020-08-24 | End: 2020-08-24

## 2020-08-24 RX ADMIN — LIDOCAINE 1 PATCH: 50 PATCH CUTANEOUS at 08:03

## 2020-08-24 RX ADMIN — GLYCERIN, PETROLATUM, PHENYLEPHRINE HCL, PRAMOXINE HCL: 144; 2.5; 10; 15 CREAM TOPICAL at 08:02

## 2020-08-24 RX ADMIN — LORAZEPAM 1 MG: 1 TABLET ORAL at 16:22

## 2020-08-24 RX ADMIN — FLUTICASONE PROPIONATE 1 SPRAY: 50 SPRAY, METERED NASAL at 08:02

## 2020-08-24 RX ADMIN — GABAPENTIN 800 MG: 400 CAPSULE ORAL at 12:20

## 2020-08-24 RX ADMIN — CLONAZEPAM 0.5 MG: 0.5 TABLET ORAL at 21:55

## 2020-08-24 RX ADMIN — GABAPENTIN 800 MG: 400 CAPSULE ORAL at 08:01

## 2020-08-24 RX ADMIN — GABAPENTIN 800 MG: 400 CAPSULE ORAL at 21:55

## 2020-08-24 RX ADMIN — CLONAZEPAM 0.5 MG: 0.5 TABLET ORAL at 08:02

## 2020-08-24 RX ADMIN — ACETAMINOPHEN 975 MG: 325 TABLET ORAL at 11:03

## 2020-08-24 RX ADMIN — CLONAZEPAM 0.5 MG: 0.5 TABLET ORAL at 13:38

## 2020-08-24 RX ADMIN — ALBUTEROL SULFATE 2 PUFF: 90 AEROSOL, METERED RESPIRATORY (INHALATION) at 08:02

## 2020-08-24 NOTE — NURSING NOTE
Roque Kirkland began pacing and yelling out at 0500 and declines a PRN at this time  Will continue to monitor and support

## 2020-08-24 NOTE — PROGRESS NOTES
Pt continues to have rapid mood shifts and lability  Agitated and irritable  Fast pacing the halls  Yelling, cursing, and screaming in her room  Talking to herself  Pt repeatedly saying "these people keep bothering me "  When pt is asked who she is referring to she states "these people "  Pt still paranoid and delusional  Internally preoccupied, but denies hallucinations  Appears to be responding to stimuli  Pt compliant with scheduled medications, but they appear to be non-effective  Pt is refusing to take any other medications for mood instability  Pt had c/o back pain which she was given Tylenol 975 mg @ 1103  Pt also provided with scheduled Lidoderm patch and heat packs  Pt reports no relief  Denies SI/HI  Pt requesting to be D/C'd  Will continue to monitor and assess

## 2020-08-24 NOTE — PROGRESS NOTES
Progress Note - Behavioral Health     Matt Holbrook 53 y o  female MRN: [id number]   Unit/Bed#: [facility] 251-01 Encounter: [id number]    Behavior over the last 24 hours:      Ousmane Bird was seen for an inpatient follow-up psychiatric visit this date  She remains paranoid, delusional, labile, and irritable  She has frequent verbal outbursts and many somatic complaints  She has not had any dangerous or aggressive behavior on the unit  She remains discharge focused and states she can stay with her friend when she leaves  She refuses any medication changes at this time but is taking her scheduled meds as prescribed  ROS: no complaints, all other systems are negative    Mental Status Evaluation:    Appearance:  disheveled   Behavior:  agitated   Speech:  pressured, hypertalkative   Mood:  anxious, labile, irritable   Affect:  labile   Thought Process:  disorganized, tangential   Associations: intact associations   Thought Content:  fixed delusions, paranoid ideation   Perceptual Disturbances: denies auditory hallucinations when asked, appears responding to internal stimuli, appears preoccupied, talks to self at times   Risk Potential: Suicidal ideation - None  Homicidal ideation - None  Potential for aggression - No   Sensorium:  oriented to person, place and time/date   Memory:  recent and remote memory grossly intact   Consciousness:  alert and awake   Attention: poor concentration and poor attention span   Insight:  poor   Judgment: poor   Gait/Station: normal gait/station, normal balance   Motor Activity: no abnormal movements     Vital signs in last 24 hours:    Temp:  [97 6 °F (36 4 °C)-98 5 °F (36 9 °C)] 98 5 °F (36 9 °C)  HR:  [77-98] 98  Resp:  [16-18] 18  BP: ()/(56-69) 117/69    Laboratory results:  I have personally reviewed all pertinent laboratory/tests results      Progress Toward Goals: poor insight    Assessment/Plan   Principal Problem:    Schizoaffective disorder, bipolar type (HCC)  Active Problems: LYNN (generalized anxiety disorder)    Medical clearance for psychiatric admission    Alcohol dependence with unspecified alcohol-induced disorder ([facility] [address] )    Mild intermittent asthma without complication    Tobacco abuse    Recommended Treatment:     Continue current medications as prescribed  Continue to monitor  Discharge disposition and planning are ongoing        All current active medications have been reviewed  Encourage group therapy, milieu therapy and occupational therapy  Behavioral Health checks every 7 minutes    Current Facility-Administered Medications   Medication Dose Route Frequency Provider Last Rate    acetaminophen  650 mg Oral Q6H PRN Deny Rogel MD      acetaminophen  650 mg Oral Q4H PRN Deny Rogel MD      acetaminophen  975 mg Oral Q6H PRN Mikerel Alessandro Porterics, CRNP      albuterol  2 puff Inhalation Q4H PRN Rosedana Marley PA-C      aluminum-magnesium hydroxide-simethicone  15 mL Oral Q4H PRN Isis Lama, CRNP      benztropine  1 mg Intramuscular Q6H PRN Isis Porterics, CRNP      benztropine  1 mg Oral Q6H PRN Isis Lama, CRNP      clonazePAM  0 5 mg Oral TID Deny Rogel MD      fluticasone  1 spray Nasal Daily Rosejorge Marley PA-C      gabapentin  800 mg Oral TID Isis Lama, CRNP      haloperidol  5 mg Oral Q6H PRN Isis Lama, CRNP      haloperidol lactate  5 mg Intramuscular Q6H PRN Deny Rogel MD      hydrOXYzine HCL  25 mg Oral Q6H PRN Isis Porterics, CRNP      hydrOXYzine HCL  50 mg Oral Q4H PRN Isis Porterics, CRNP      lidocaine  1 patch Topical Daily Rosejorge Marley PA-C      LORazepam  2 mg Intramuscular Q6H PRN Deny Rogel MD      magnesium hydroxide  30 mL Oral Daily PRN Isis Lama, CRNP      nicotine  1 patch Transdermal Daily Deny Rogel MD      OLANZapine  10 mg Intramuscular Q3H PRN Shane Porterics, CRNP      Pramox-PE-Glycerin-Petrolatum   Rectal BID PRN RoseRAMIN ConradC      risperiDONE  2 mg Oral Q3H PRN Nica Heredia MD      traZODone  50 mg Oral HS PRN JARED Jones         Risks / Benefits of Treatment:    Risks, benefits, and possible side effects of medications explained to patient and patient verbalizes understanding and agreement for treatment  Counseling / Coordination of Care:      Patient's progress discussed with staff in treatment team meeting  Medications, treatment progress and treatment plan reviewed with patient

## 2020-08-24 NOTE — PROGRESS NOTES
08/24/20 1300   Activity/Group Checklist   Group   (Self Reflection and Art Therapy Processing)   Attendance Attended   Attendance Duration (min) Greater than 60   Interactions Interacted appropriately   Affect/Mood Appropriate   Goals Achieved Identified feelings; Discussed coping strategies; Able to listen to others; Able to engage in interactions

## 2020-08-24 NOTE — PROGRESS NOTES
08/24/20 0900   Team Meeting   Meeting Type Daily Rounds   Initial Conference Date 08/24/20   Team Members Present   Team Members Present Physician;Nurse;   Physician Team Member [facility]   Nursing Team Member Teri Linares RN   Social Work Team Member LISBETH Montez; Umm George   Patient/Family Present   Patient Present No   Patient's Family Present No     Patient presents very labile  Yelling and arguing with staff over the weekend  Refusing medication adjustments Patient is homeless and has no supports in the community  Disposition issue  Patient did mention over the weekend that she might be able to stay with an ex-boyfriend but currently is stating that she doesn't want him contacted

## 2020-08-24 NOTE — PLAN OF CARE
Problem: Ineffective Coping  Goal: Identifies ineffective coping skills  Outcome: Progressing  Goal: Identifies healthy coping skills  Outcome: Progressing  Goal: Demonstrates healthy coping skills  Outcome: Not Progressing  Goal: Patient/Family participate in treatment and DC plans  Description: Interventions:  - Provide therapeutic environment  Outcome: Progressing  Goal: Patient/Family verbalizes awareness of resources  Outcome: Progressing  Goal: Understands least restrictive measures  Description: Interventions:  - Utilize least restrictive behavior  Outcome: Progressing  Goal: Free from restraint events  Description: - Utilize least restrictive measures   - Provide behavioral interventions   - Redirect inappropriate behaviors   Outcome: Progressing     Problem: Depression  Goal: Refrain from harming self  Description: Interventions:  - Monitor patient closely, per order   - Supervise medication ingestion, monitor effects and side effects   Outcome: Progressing  Goal: Attend and participate in unit activities, including therapeutic, recreational, and educational groups  Description: Interventions:  - Provide therapeutic and educational activities daily, encourage attendance and participation, and document same in the medical record   Outcome: Progressing     Problem: Anxiety  Goal: Anxiety is at manageable level  Description: Interventions:  - Assess and monitor patient's anxiety level  - Monitor for signs and symptoms (heart palpitations, chest pain, shortness of breath, headaches, nausea, feeling jumpy, restlessness, irritable, apprehensive)  - Collaborate with interdisciplinary team and initiate plan and interventions as ordered    - [place] patient to unit/surroundings  - Explain treatment plan  - Encourage participation in care  - Encourage verbalization of concerns/fears  - Identify coping mechanisms  - Assist in developing anxiety-reducing skills  - Administer/offer alternative therapies  - Limit or eliminate stimulants  Outcome: Not Progressing     Problem: SUBSTANCE USE/ABUSE  Goal: By discharge, will develop insight into their chemical dependency and sustain motivation to continue in recovery  Description: INTERVENTIONS:  - Attends all daily group sessions and scheduled AA groups  - Actively practices coping skills through participation in the therapeutic community and adherence to program rules  - Reviews and completes assignments from individual treatment plan  - Assist patient development of understanding of their personal cycle of addiction and relapse triggers  Outcome: Progressing  Goal: By discharge, patient will have ongoing treatment plan addressing chemical dependency  Description: INTERVENTIONS:  - Assist patient with resources and/or appointments for ongoing recovery based living  Outcome: Progressing     Problem: DISCHARGE PLANNING - CARE MANAGEMENT  Goal: Discharge to post-acute care or home with appropriate resources  Description: INTERVENTIONS:  - Conduct assessment to determine patient/family and health care team treatment goals, and need for post-acute services based on payer coverage, community resources, and patient preferences, and barriers to discharge  - Address psychosocial, clinical, and financial barriers to discharge as identified in assessment in conjunction with the patient/family and health care team  - Arrange appropriate level of post-acute services according to patients   needs and preference and payer coverage in collaboration with the physician and health care team  - Communicate with and update the patient/family, physician, and health care team regarding progress on the discharge plan  - Arrange appropriate transportation to post-acute venues  Outcome: Progressing

## 2020-08-25 PROCEDURE — 99232 SBSQ HOSP IP/OBS MODERATE 35: CPT | Performed by: PSYCHIATRY & NEUROLOGY

## 2020-08-25 RX ADMIN — LORAZEPAM 2 MG: 2 INJECTION INTRAMUSCULAR; INTRAVENOUS at 18:38

## 2020-08-25 RX ADMIN — CLONAZEPAM 0.5 MG: 0.5 TABLET ORAL at 08:08

## 2020-08-25 RX ADMIN — CLONAZEPAM 0.5 MG: 0.5 TABLET ORAL at 13:03

## 2020-08-25 RX ADMIN — FLUTICASONE PROPIONATE 1 SPRAY: 50 SPRAY, METERED NASAL at 08:07

## 2020-08-25 RX ADMIN — LIDOCAINE 1 PATCH: 50 PATCH CUTANEOUS at 08:10

## 2020-08-25 RX ADMIN — GABAPENTIN 800 MG: 400 CAPSULE ORAL at 22:25

## 2020-08-25 RX ADMIN — GABAPENTIN 800 MG: 400 CAPSULE ORAL at 13:02

## 2020-08-25 RX ADMIN — CLONAZEPAM 0.5 MG: 0.5 TABLET ORAL at 22:25

## 2020-08-25 RX ADMIN — ALBUTEROL SULFATE 2 PUFF: 90 AEROSOL, METERED RESPIRATORY (INHALATION) at 09:22

## 2020-08-25 RX ADMIN — GABAPENTIN 800 MG: 400 CAPSULE ORAL at 07:43

## 2020-08-25 NOTE — PLAN OF CARE
Problem: Ineffective Coping  Goal: Identifies ineffective coping skills  8/25/2020 1108 by Amarilis Chou RN  Outcome: Progressing  8/25/2020 1027 by Amarilis Chou RN  Outcome: Progressing  Goal: Identifies healthy coping skills  8/25/2020 1108 by Amarilis Chou RN  Outcome: Progressing  8/25/2020 1027 by Amarilis Chou RN  Outcome: Progressing  Goal: Demonstrates healthy coping skills  Outcome: Progressing  Goal: Patient/Family participate in treatment and DC plans  Description: Interventions:  - Provide therapeutic environment  Outcome: Progressing  Goal: Patient/Family verbalizes awareness of resources  8/25/2020 1108 by Amarilis Chou RN  Outcome: Progressing  8/25/2020 1027 by Amarilis Chou RN  Outcome: Progressing  Goal: Understands least restrictive measures  Description: Interventions:  - Utilize least restrictive behavior  Outcome: Progressing  Goal: Free from restraint events  Description: - Utilize least restrictive measures   - Provide behavioral interventions   - Redirect inappropriate behaviors   Outcome: Progressing     Problem: Depression  Goal: Treatment Goal: Demonstrate behavioral control of depressive symptoms, verbalize feelings of improved mood/affect, and adopt new coping skills prior to discharge  8/25/2020 1108 by Amarilis Chou RN  Outcome: Progressing  8/25/2020 1027 by Amarilis Chou RN  Outcome: Progressing  Goal: Refrain from harming self  Description: Interventions:  - Monitor patient closely, per order   - Supervise medication ingestion, monitor effects and side effects   8/25/2020 1108 by Amarilis Chou RN  Outcome: Progressing  8/25/2020 1027 by Amarilis Chou RN  Outcome: Progressing  Goal: Refrain from self-neglect  8/25/2020 1108 by Amarilis Chou RN  Outcome: Progressing  8/25/2020 1027 by Amarilis Chou RN  Outcome: Progressing  Goal: Attend and participate in unit activities, including therapeutic, recreational, and educational groups  Description: Interventions:  - Provide therapeutic and educational activities daily, encourage attendance and participation, and document same in the medical record   8/25/2020 1108 by Salena Fenton RN  Outcome: Progressing  8/25/2020 1027 by Salena Fneton RN  Outcome: Progressing     Problem: Ineffective Coping  Goal: Participates in unit activities  Description: Interventions:  - Provide therapeutic environment   - Provide required programming   - Redirect inappropriate behaviors   8/25/2020 1108 by Salena Fenton RN  Outcome: Progressing  8/25/2020 1027 by Salena Fenton RN  Outcome: Progressing     Problem: Anxiety  Goal: Anxiety is at manageable level  Description: Interventions:  - Assess and monitor patient's anxiety level  - Monitor for signs and symptoms (heart palpitations, chest pain, shortness of breath, headaches, nausea, feeling jumpy, restlessness, irritable, apprehensive)  - Collaborate with interdisciplinary team and initiate plan and interventions as ordered    - [place] patient to unit/surroundings  - Explain treatment plan  - Encourage participation in care  - Encourage verbalization of concerns/fears  - Identify coping mechanisms  - Assist in developing anxiety-reducing skills  - Administer/offer alternative therapies  - Limit or eliminate stimulants  8/25/2020 1108 by Salena Fenton RN  Outcome: Not Progressing  8/25/2020 1027 by Salena Fenton RN  Outcome: Not Progressing

## 2020-08-25 NOTE — PROGRESS NOTES
08/25/20 1000   Activity/Group Checklist   Group   (Creative Self Expression)   Attendance Attended   Attendance Duration (min) Greater than 60   Interactions Interacted appropriately   Affect/Mood Appropriate   Goals Achieved Identified feelings; Discussed coping strategies; Able to listen to others; Able to engage in interactions; Able to manage/cope with feelings

## 2020-08-25 NOTE — PROGRESS NOTES
Patient remains to be labile and have intermittent verbal outbursts on the unit this morning  She states that her stay in the hospital has made her panic more than usual and "these people are ridiculous, they're making me crazy"  She fails to explain who "these people" are, but she tells this nurse she talks to herself to calm down  She denies hallucinations, although she appears internally preoccupied  She also denies depression but says her anger and anxiety are high  She remains medication and meal compliant  She is cooperative with staff despite her verbal outbursts  She tells this nurse she is ready to leave, as her daughter will pick her up or provide her a ride, and she is excited to talk to her counselor outside of the hospital  Patient denies SI/HI and denies further needs at this time  Will maintain on safety precautions and continual monitoring

## 2020-08-25 NOTE — PROGRESS NOTES
Patient reported feeling anxious and stated "these people are driving me crazy it's disgusting", as she presented being distressed  She requested IM ativan as it was given to her 2 days prior, she was reminded that she was just given her klonopin about and hour and a half ago, and the patient decided to walk in order to cope instead  Will maintain on safety precautions and continual monitoring

## 2020-08-25 NOTE — PROGRESS NOTES
Pt agitated and yelling "what they are doing to me is disgusting " Pt unable to calm slef down  Requested and given PRN Ativan, left delt without incident

## 2020-08-25 NOTE — PROGRESS NOTES
08/24/20 1700   Activity/Group Checklist   Group   (Creative Reflections Art Therapy)   Attendance Attended   Attendance Duration (min) Greater than 60   Interactions Interacted appropriately   Affect/Mood Appropriate;Calm   Goals Achieved Identified feelings; Identified triggers; Discussed coping strategies; Able to listen to others; Able to engage in interactions

## 2020-08-25 NOTE — PROGRESS NOTES
08/25/20 0730   Activity/Group Checklist   Group   (Goal Planning and Communication)   Attendance Attended   Attendance Duration (min) 46-60   Interactions Interacted appropriately   Affect/Mood Appropriate   Goals Achieved Identified feelings; Discussed coping strategies

## 2020-08-25 NOTE — PROGRESS NOTES
08/25/20 8256   Team Meeting   Meeting Type Daily Rounds   Initial Conference Date 08/25/20   Patient/Family Present   Patient Present No   Patient's Family Present No     Team Members Present:   MD Rick Miranda CRNP Juanice Dasen, YUDY Shah, BSW  Lennie Luther, JORGE    Still sobbing, screaming  Needs a mood stabilizer  No DC  date yet

## 2020-08-25 NOTE — PROGRESS NOTES
Progress Note - Behavioral Health   Seanpresley Sanford 53 y o  female MRN: [id number]  Unit/Bed#: [facility] 251-01 Encounter: [id number]    Assessment/Plan   Principal Problem:    Schizoaffective disorder, bipolar type ([facility] )  Active Problems:    LYNN (generalized anxiety disorder)    Medical clearance for psychiatric admission    Alcohol dependence with unspecified alcohol-induced disorder ([facility] )    Mild intermittent asthma without complication    Tobacco abuse      Behavior over the last 24 hours:  Improved  Patient reports that she feels better and she wants to leave  Patient reports that she will go stay with a friend in [place]  Patient has been irritable and demanding at times  She does report that the medication has helped to control her mood to some extent  She denies having any thoughts of willing to hurt self or others  Patient initially wanted to be discharged today but we discussed arranging for discharge and transportation tomorrow and she is agreeable with the plan  Sleep: normal  Appetite: normal  Medication side effects: No  ROS: no complaints    Mental Status Evaluation:  Appearance:  casually dressed   Behavior:  uncooperative   Speech:  loud   Mood:  irritable   Affect:  increased in intensity   Thought Process:  circumstantial   Thought Content:  obsessions   Perceptual Disturbances: None   Risk Potential: Suicidal Ideations none  Homicidal Ideations none  Potential for Aggression No   Sensorium:  person and place   Memory:  recent and remote memory grossly intact   Consciousness:  awake    Attention: attention span appeared shorter than expected for age   Insight:  limited   Judgment: limited   Gait/Station: slow   Motor Activity: no abnormal movements     Progress Toward Goals: ongoing    Recommended Treatment: Continue with group therapy, milieu therapy and occupational therapy        Risks, benefits and possible side effects of Medications:   Risks, benefits, and possible side effects of medications explained to patient and patient verbalizes understanding  Medications: continue current psychiatric medications  Labs: I have personally reviewed all pertinent laboratory/tests results  Counseling / Coordination of Care  Total floor / unit time spent today 25 minutes  Greater than 50% of total time was spent with the patient and / or family counseling and / or coordination of care   A description of the counseling / coordination of care:

## 2020-08-25 NOTE — NURSING NOTE
Pt had been calm and cooperative throughout the night; she says she is ready for D/C and asked to use the phone to call her friend who she is supposed to be staying with after D/C  The call was placed and she calmly left a message  Continue to monitor and support

## 2020-08-26 VITALS
OXYGEN SATURATION: 96 % | TEMPERATURE: 98.2 F | SYSTOLIC BLOOD PRESSURE: 99 MMHG | RESPIRATION RATE: 16 BRPM | HEIGHT: 60 IN | WEIGHT: 105 LBS | DIASTOLIC BLOOD PRESSURE: 62 MMHG | HEART RATE: 107 BPM | BODY MASS INDEX: 20.62 KG/M2

## 2020-08-26 PROCEDURE — 99238 HOSP IP/OBS DSCHRG MGMT 30/<: CPT | Performed by: NURSE PRACTITIONER

## 2020-08-26 RX ORDER — LIDOCAINE 50 MG/G
1 PATCH TOPICAL DAILY
Qty: 15 PATCH | Refills: 0 | Status: SHIPPED | OUTPATIENT
Start: 2020-08-27 | End: 2021-07-29 | Stop reason: HOSPADM

## 2020-08-26 RX ORDER — GABAPENTIN 400 MG/1
800 CAPSULE ORAL 3 TIMES DAILY
Qty: 180 CAPSULE | Refills: 0 | Status: SHIPPED | OUTPATIENT
Start: 2020-08-26 | End: 2021-07-29 | Stop reason: HOSPADM

## 2020-08-26 RX ADMIN — LIDOCAINE 1 PATCH: 50 PATCH CUTANEOUS at 08:46

## 2020-08-26 RX ADMIN — GABAPENTIN 800 MG: 400 CAPSULE ORAL at 08:21

## 2020-08-26 RX ADMIN — CLONAZEPAM 0.5 MG: 0.5 TABLET ORAL at 08:21

## 2020-08-26 RX ADMIN — ALBUTEROL SULFATE 2 PUFF: 90 AEROSOL, METERED RESPIRATORY (INHALATION) at 08:21

## 2020-08-26 RX ADMIN — FLUTICASONE PROPIONATE 1 SPRAY: 50 SPRAY, METERED NASAL at 08:21

## 2020-08-26 NOTE — PLAN OF CARE
Problem: DISCHARGE PLANNING - CARE MANAGEMENT  Goal: Discharge to post-acute care or home with appropriate resources  Description: INTERVENTIONS:  - Conduct assessment to determine patient/family and health care team treatment goals, and need for post-acute services based on payer coverage, community resources, and patient preferences, and barriers to discharge  - Address psychosocial, clinical, and financial barriers to discharge as identified in assessment in conjunction with the patient/family and health care team  - Arrange appropriate level of post-acute services according to patients   needs and preference and payer coverage in collaboration with the physician and health care team  - Communicate with and update the patient/family, physician, and health care team regarding progress on the discharge plan  - Arrange appropriate transportation to post-acute venues  8/26/2020 0853 by Sandy Saint  Outcome: Adequate for Discharge  8/25/2020 2031 by Sandy Saint  Outcome: Adequate for Discharge    Pt related satisfaction with her discharge plan and intends to cooperate with ACT services upon discharge

## 2020-08-26 NOTE — PROGRESS NOTES
08/26/20 0900   Team Meeting   Meeting Type Daily Rounds   Initial Conference Date 08/26/20   Team Members Present   Team Members Present Physician;Nurse;   Physician Team Member Dr Miguelangel Arenas; JARED Perry   Nursing Team Member Hortencia Marte RN   Social Work Team Member LISBETH Morin; Umm Fabian   Patient/Family Present   Patient Present No   Patient's Family Present No     Discharge today at 1100  Follow up through [facility] who will support her in the community  Staying with a friend in the community  Hopes to move in with another friend in the future

## 2020-08-26 NOTE — PLAN OF CARE
Problem: Ineffective Coping  Goal: Participates in unit activities  Description: Interventions:  - Provide therapeutic environment   - Provide required programming   - Redirect inappropriate behaviors   Outcome: Adequate for Discharge

## 2020-08-26 NOTE — SOCIAL WORK
SETH met with pt who desires discharge tomorrow to stay with her friend Danis Cline in [place] for a short time, until she is able to move back with former roommate, Ashley Acosta, whom she anticipates will arrange the rental of a two-bedroom for pt and Vadim Mcmillan, having noted that Vadim Mcmillan is ill and is not able to receive calls, at present  Pt did not sign DEYA for Danis Cline who, reportedly, works as a  and cannot receive calls during daytime hours  Pt related that her daughter Veronica Baker can confirm pt's discharge plans as safe  SETH called pt's daughter Veronica Baker who noted that pt's friend Danis Cline knows that pt screams, having stated that she would be safe in his home  She is unable to provide transportation upon pt's discharge  SETH called pt's [facility] ACT team  Harry Santoyo to notify her of pt's discharge    Pt will receive call from Dr Raeann Schaeffer on 8/28/20 at 2 pm, and call from ACT staff, Sadie Joyce, on 8/28/20, at 12 pm

## 2020-08-26 NOTE — PROGRESS NOTES
08/26/20 0730   Activity/Group Checklist   Group   (Goal Planning and Communication)   Attendance Attended   Attendance Duration (min) 46-60   Interactions Interacted appropriately   Affect/Mood Appropriate;Bright;Calm   Goals Achieved Identified feelings; Discussed coping strategies; Able to listen to others; Able to engage in interactions

## 2020-08-26 NOTE — PROGRESS NOTES
Patient bright, alert, pacing in hallways at arrival of shift  Pressured, hyperverbal, talking to self, profanities, paranoid about people intentionally "Fucking with her her on unit"  Able to stay controlled  Tells this nurse she  Wants to go to a friends house and "take care of things" and then possibly think about going to rehab because she is an  Alcoholic  Pt tells this nurs ehse is going home today at 11 and feels ready  Denies si hi and hallucinations  Will maintain on safety precautions and continual monitoring  No needs identified

## 2020-08-26 NOTE — DISCHARGE INSTR - APPOINTMENTS
The treatment recommends that you continue to receive Assertive Community Treatment (ACT) services provided by your current provider  A telehealth phone appointment has been scheduled in your behalf on Friday, August 28, 2020 at 2:00 pm, with Dr Haily Cui, your psychiatrist at [facility], [address], [place] [address]; Phone:  [phone]; Fax:  [phone]  Please promptly answer Dr Jennifer Wilcox call to you on your cell phone  Your summary of care will be faxed to this provider for continuity of care  A telehealth phone appointment has been scheduled in your behalf on Friday, August 28, 2020 at 12:00 pm, with Jazmin Vega, your substance abuse counselor at [facility], [address], [place] [address]; Phone:  [phone]; Fax:  [phone]  Please promptly answer Tisha's call to you on your cell phone  Your summary of care will be faxed to this provider for continuity of care  You declined the scheduling of an appointment with your primary care physician, Dr Lina Lin, [address] [facility], [address] [facility],   [address], [place], [address]; Phone:  [phone]; Fax:  [phone]  Please call to schedule an appointment, as needed  At your request, your summary of care will not be not be faxed to this provider  You have declined consideration of seeking additional substance abuse rehab services, at present  If you change your mind, please consider contacting [address] [place] [address] [facility], [address] [facility], [place] [address], [address]; Phone:  [phone]; Fax:  [phone]  The staff at this agency can assist you in collaborating with your ACT team for the purpose of providing you with additional services to address your substance abuse  Please note that Melissa Tuttle RN, our [address] [place] [address] Nurse Navigator, will be calling you after your discharge, on the phone number that you provided    She will be available as an additional support, if needed  If you wish to speak with her, you may contact Kamilla Scott at [phone]

## 2020-08-26 NOTE — BH TRANSITION RECORD
Contact Information: If you have any questions, concerns, pended studies, tests and/or procedures, or emergencies regarding your inpatient behavioral health visit  Please contact [facility], [facility] behavioral health unit [phone] and ask to speak to a , nurse or physician  A contact is available 24 hours/ 7 days a week at this number  Summary of Procedures Performed During your Stay:  Below is a list of major procedures performed during your hospital stay and a summary of results:  - No major procedures performed  Pending Studies (From admission, onward)    None        If studies are pending at discharge, follow up with your PCP and/or referring provider

## 2020-08-26 NOTE — PROGRESS NOTES
Pt is due to discharge today  The followings is a completed list of the pt belongings upon discharge:    Pt was admitted today  This is a completed list of the pt clothing and belongings:  1 Pr shoes  1 pr slippers  Large bag of assorted clothing   1 ppr earrings   1 black mask   $25 [employer] gift Card   1 [employer]   1 EBT Card  1 [employer] Gift Card  !  Pa ID Card  $1 36 in change  1 toothpaste and tooth brush   1 flash light  1 multicolor wallet   1 pack gum  1 black wallet  1 desk diary  1 snake skin wallet  1 blaack bag with pens   2 eye glass cases with glasses   1 yellow bag of cosmetics  Hygenic wipes  1 pk cigarettes  2 cell phone chargers   1 cell phone   1 key chain   1 pk matches   1 lighter 2 lip balm   2 hair clips   1 bag tobacco

## 2020-08-26 NOTE — SOCIAL WORK
SW met with pt who expressed desire / readiness for discharge to stay temporarily with her friend Danis Cline in [place], having noted that her daughter Veronica Baker will meet her at his residence upon her arrival, as Veronica Baker has pt's belongings that she needs  Pt denied SI / HI and agreed to continue receipt of [facility] ACT services; she continues to decline referral for additional substance abuse services

## 2020-08-26 NOTE — DISCHARGE INSTRUCTIONS
Cigarette Smoking and Your Health   AMBULATORY CARE:   Risks to your health if you smoke:  Nicotine and other chemicals found in tobacco damage every cell in your body  Even if you are a light smoker, you have an increased risk for cancer, heart disease, and lung disease  If you are pregnant or have diabetes, smoking increases your risk for complications  Benefits to your health if you stop smoking:   · You decrease respiratory symptoms such as coughing, wheezing, and shortness of breath  · You reduce your risk for cancers of the lung, mouth, throat, kidney, bladder, pancreas, stomach, and cervix  If you already have cancer, you increase the benefits of chemotherapy  You also reduce your risk for cancer returning or a second cancer from developing  · You reduce your risk for heart disease, blood clots, heart attack, and stroke  · You reduce your risk for lung infections, and diseases such as pneumonia, asthma, chronic bronchitis, and emphysema  · Your circulation improves  More oxygen can be delivered to your body  If you have diabetes, you lower your risk for complications, such as kidney, artery, and eye diseases  You also lower your risk for nerve damage  Nerve damage can lead to amputations, poor vision, and blindness  · You improve your body's ability to heal and to fight infections  Benefits to the health of others if you stop smoking:  Tobacco is harmful to nonsmokers who breathe in your secondhand smoke  The following are ways the health of others around you may improve when you stop smoking:  · You lower the risks for lung cancer and heart disease in nonsmoking adults  · If you are pregnant, you lower the risk for miscarriage, early delivery, low birth weight, and stillbirth  You also lower your baby's risk for SIDS, obesity, developmental delay, and neurobehavioral problems, such as ADHD       · If you have children, you lower their risk for ear infections, colds, pneumonia, bronchitis, and asthma  For more information and support to stop smoking:   · Smokefree  gov  Phone: 5- 331 - 058-9095  Web Address: www Risk I/O  Follow up with your healthcare provider as directed:  Write down your questions so you remember to ask them during your visits  © 2017 [address] Information is for End User's use only and may not be sold, redistributed or otherwise used for commercial purposes  All illustrations and images included in CareNotes® are the copyrighted property of [employer]  or Jamari Mota  The above information is an  only  It is not intended as medical advice for individual conditions or treatments  Talk to your doctor, nurse or pharmacist before following any medical regimen to see if it is safe and effective for you

## 2020-08-26 NOTE — DISCHARGE INSTR - OTHER ORDERS
You will discharge to stay with your friend Abril Whelan at his residence located at [address]; [phone] (your cell )    Crisis Plan:    Triggers you identified during your hospital stay that led to your experience of a "nervous breakdown" included:  Homelessness; financial stressors; lack of contact with one daughter; long-term excessive use of alcohol and substance abuse; and increasing anxiety and depression  Coping skills you identified during your hospital stay include:  Music, walking, and online games  After discharge, if you find your coping skills are not effective and you continue to feel distressed, please talk with family and friends and / or contact your [facility] providers  If that is not effective and you feel you are a danger to yourself or others, please contact [facility]: [phone], [address] [place] [address] Street:  [phone], Peer Support Talk Line (Seven days a week, 1:00 PM  9:00 PM) Call: [phone] or Text: [phone], Alcohol Anonymous: 416.826.2977, [place]: 561.704.8788, Jorge on [address] [place] [address] ([facility]) [employer]: 106.408.3993/Email: www [employer]  org, or Substance Abuse and Mental Health Services Administration([facility]) [employer], which is a confidential, free, 24-hour-a-day, 365-day-a-year, information service for individuals and family members facing mental health and/or substance use disorders  This service provides referrals to local treatment facilities, support groups, and community-based organizations  Callers can also order free publications and other information  Call 6-226.306.6821/DDZFR: www [facility] gov    What you need to know aboutcoronavirus disease 2019 (COVID-19)     What is coronavirus disease 2019 (COVID-19)? Coronavirus disease 2019 (COVID-19) is a respiratory illness that can spread from person to person   The virus that causes COVID-19 is a novel coronavirus that was first identified during an investigation into an outbreak in [place], [place]  Can people in [place]  get [place]? Yes  COVID-19 is spreading from person to person in parts of the [place]  Risk of infection with COVID-19 is higher for people who are close contacts of someone known to have COVID-19, for example healthcare workers, or household members  Other people at higher risk for infection are those who live in or have recently been in an area with ongoing spread of COVID-19  Learn more about places with ongoing spread at   [facility]  html#geographic  Have there been cases of COVID[place]19 in [place] ?   Yes  The first case of COVID-19 in the [place] was reported on January 21, 2020  The current count of cases of COVID-19 in the [facility] is available on [employer] at [facility]  How does COVID-19 spread? The virus that causes COVID-19 probably emerged from an animal source, but is now spreading from person to person  The virus is thought to spread mainly between people who are in close contact with one another (within about 6 feet) through respiratory droplets produced when an infected person coughs or sneezes  It also may be possible that a person can get COVID-19 by touching a surface or object that has the virus on it and then touching their own mouth, nose, or possibly their eyes, but this is not thought to be the main way the virus spreads  Learn what is known about the spread of newly emerged coronaviruses at [facility]  What are the symptoms of COVID-19? Patients with COVID-19 have had mild to severe respiratory illness with symptoms of   fever   cough   shortness of breath  What are severe complications from this virus?    Some patients have pneumonia in both lungs, multi-organ failure and in some cases death    How can I help protect myself? People can help protect themselves from respiratory illness with everyday preventive actions  Avoid close contact with people who are sick  Avoid touching your eyes, nose, and mouth withunwashed hands  Wash your hands often with soap and water for at least 20 seconds  Use an alcohol-based hand  that contains at least 60% alcohol if soap and water are not available  If you are sick, to keep from spreading respiratory illness to others, you should   Stay home when you are sick  Cover your cough or sneeze with a tissue, then throw the tissue in the trash  Clean and disinfect frequently touched objectsand surfaces  What should I do if I recently traveled from an area with ongoing spread of COVID-19? If you have traveled from an affected area, there may be restrictions on your movements for up to 2 weeks  If you develop symptoms during that period (fever, cough, trouble breathing), seek medical advice  Call the office of your health care provider before you go, and tell them about your travel and your symptoms  They will give you instructions on how to get care without exposing other people to your illness  While sick, avoid contact with people, don't go out and delay any travel to reduce the possibility of spreading illness to others  Is there a vaccine? There is currently no vaccine to protect against COVID-19  The best way to prevent infection is to take everyday preventive actions, like avoiding close contact with people who are sick and washing your hands often  Is there a treatment? There is no specific antiviral treatment for COVID-19  People with COVID-19 can seek medical care to helprelieve symptoms    For more information: www cdc gov/TKMJY27ML 486739-N 03/03/2020     What to do if you are sick withcoronavirus disease 2019 (COVID-19)     If you are sick with COVID-19 or suspect you are infected with the virus that causes COVID-19, follow the steps below to help prevent the disease from spreading to people in your home and community  Stay home except to get medical care   You should restrict activities outside your home, except for getting medical care  Do not go to work, school, or public areas  Avoid using public transportation, ride-sharing, or taxis  Separate yourself from other people and animals inyour home  People: As much as possible, you should stay in a specific room and away from other people in your home  Also, you should use a separate bathroom, if available  Animals: Do not handle pets or other animals while sick  See COVID-19 and Animals for more information  Call ahead before visiting your doctor   If you have a medical appointment, call the healthcare provider and tell them that you have or may have COVID-19  This will help the healthcare provider's office take steps to keep other people from getting infected or exposed  Wear a facemask  You should wear a facemask when you are around other people (e g , sharing a room or vehicle) or pets and before you enter a healthcare provider's office  If you are not able to wear a facemask (for example, because it causes trouble breathing), then people who live with you should not stay in the same room with you, or they should wear a facemask if they enteryour room  Cover your coughs and sneezes   Cover your mouth and nose with a tissue when you cough or sneeze  Throw used tissues in a lined trash can; immediately wash your hands with soap and water for at least 20 seconds or clean your hands with an alcohol-based hand  that contains at least 60 to 95% alcohol, covering all surfaces of your hands and rubbing them together until they feel dry  Soap and water should be used preferentially if hands are visibly dirty    Avoid sharing personal household items   You should not share dishes, drinking glasses, cups, eating utensils, towels, or bedding with other people or pets in your home  After using these items, they should be washed thoroughly with soap and water  Clean your hands often  Wash your hands often with soap and water for at least 20 seconds  If soap and water are not available, clean your hands with an alcohol-based hand  that contains at least 60% alcohol, covering all surfaces of your hands and rubbing them together until they feel dry  Soap and water should be used preferentially if hands are visibly dirty  Avoid touching your eyes, nose, and mouth with unwashed hands  Clean all "high-touch" surfaces every day  High touch surfaces include counters, tabletops, doorknobs, bathroom fixtures, toilets, phones, keyboards, tablets, and bedside tables  Also, clean any surfaces that may have blood, stool, or body fluids on them  Use a household cleaning spray or wipe, according to the label instructions  Labels contain instructions for safe and effective use of the cleaning product including precautions you should take when applying the product, such as wearing gloves and making sure you have good ventilation during use of the product  Monitor your symptoms  Seek prompt medical attention if your illness is worsening (e g , difficulty breathing)  Before seeking care, call your healthcare provider and tell them that you have, or are being evaluated for, COVID-19  Put on a facemask before you enter the facility  These steps will help the healthcare provider's office to keep other people in the office or waiting room from getting infectedor exposed  Ask your healthcare provider to call the local or [facility] health department  Persons who are placed under active monitoring or facilitated self-monitoring should follow instructions provided by their local health department or occupational health professionals, as appropriate  If you have a medical emergency and need to call 911, notify the dispatch personnel that you have, or are being evaluated for COVID-19   If possible, put on a facemask before emergency medical services arrive  Discontinuing home isolation  Patients with confirmed COVID-19 should remain under home isolation precautions until the risk of secondary transmission to others is thought to be low  The decision to discontinue home isolation precautions should be made on a case-by-case basis, in consultation with healthcare providers and state and local health departments  For more information: www cdc gov/XVNSJ12QT 390405-N 02/24/2020     Stay home when you are sick,except to get medical care  Wash your hands often with soap and water for at least 20 seconds  Cover your cough or sneeze with a tissue, then throw the tissue in the trash  Clean and disinfect frequently touched objects and surfaces  Avoid touching your eyes, nose, and mouth  STOP THE SPREAD OF GERMS  For more information: www cdc gov/COVID19 Avoid close contact with people who are sick  Help prevent the spread of respiratory diseases like COVID-19

## 2020-08-26 NOTE — NURSING NOTE
All discharge instructions and medications discussed  Pt left unit walking with AVS and belongings  No questions and concerns

## 2020-08-26 NOTE — PLAN OF CARE
Problem: DISCHARGE PLANNING - CARE MANAGEMENT  Goal: Discharge to post-acute care or home with appropriate resources  Description: INTERVENTIONS:  - Conduct assessment to determine patient/family and health care team treatment goals, and need for post-acute services based on payer coverage, community resources, and patient preferences, and barriers to discharge  - Address psychosocial, clinical, and financial barriers to discharge as identified in assessment in conjunction with the patient/family and health care team  - Arrange appropriate level of post-acute services according to patients   needs and preference and payer coverage in collaboration with the physician and health care team  - Communicate with and update the patient/family, physician, and health care team regarding progress on the discharge plan  - Arrange appropriate transportation to post-acute venues  Outcome: Progressing     Pt is agreeable to continue [place] [facility] services that will be provided soon after her discharge to stay with a friend in [place]; however, she decline a referral for receipt of substance abuse services

## 2020-08-26 NOTE — PROGRESS NOTES
Patient awoke several times throughout the night and came out to nurses station saying she couldn't sleep  Patient cooperative and calm during interactions and exhibited no yelling or screaming behaviors  Patient able to be easily redirected back to room  q7min checks maintained, will continue to monitor
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
## 2020-08-26 NOTE — PROGRESS NOTES
Patient labile and irritable  Continues report and yell about people she believes are going in out of her room  States "can you keep them out" and then immediately after states "never mind let me guess its all in my head" and stomps away from nurses station and slams door  Patient withdrawn to room most of shift  Denies SI and HI but as reported does appear to respond to hallucinations  Continues report that all meds except benzos and neurontin "make me crazy"  Will continue to monitor

## 2020-10-08 ENCOUNTER — TRANSITIONAL CARE MANAGEMENT (OUTPATIENT)
Dept: FAMILY MEDICINE CLINIC | Facility: CLINIC | Age: 50
End: 2020-10-08

## 2020-11-25 ENCOUNTER — HOSPITAL ENCOUNTER (EMERGENCY)
Facility: HOSPITAL | Age: 50
Discharge: HOME | End: 2020-11-25
Attending: EMERGENCY MEDICINE
Payer: COMMERCIAL

## 2020-11-25 VITALS
OXYGEN SATURATION: 96 % | SYSTOLIC BLOOD PRESSURE: 115 MMHG | HEART RATE: 77 BPM | DIASTOLIC BLOOD PRESSURE: 80 MMHG | TEMPERATURE: 97.9 F | RESPIRATION RATE: 18 BRPM
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
## 2020-11-25 DIAGNOSIS — S09.90XA INJURY OF HEAD, INITIAL ENCOUNTER: ICD-10-CM

## 2020-11-25 LAB
BACTERIA UR QL AUTO: NORMAL /HPF
BILIRUB UR QL STRIP: NEGATIVE
CLARITY UR: CLEAR
COLOR UR: YELLOW
COLOR, POC: NORMAL
EXT PREG TEST URINE: NEGATIVE
EXT. CONTROL ED NAV: NORMAL
GLUCOSE UR STRIP-MCNC: NEGATIVE MG/DL
HGB UR QL STRIP.AUTO: ABNORMAL
KETONES UR STRIP-MCNC: NEGATIVE MG/DL
LEUKOCYTE ESTERASE UR QL STRIP: NEGATIVE
NITRITE UR QL STRIP: NEGATIVE
NON-SQ EPI CELLS URNS QL MICRO: NORMAL /HPF
PH UR STRIP.AUTO: 5 [PH] (ref 4.5–8)
PROT UR STRIP-MCNC: NEGATIVE MG/DL
RBC #/AREA URNS AUTO: NORMAL /HPF
SP GR UR STRIP.AUTO: <=1.005 (ref 1–1.03)
UROBILINOGEN UR QL STRIP.AUTO: 0.2 E.U./DL
WBC #/AREA URNS AUTO: NORMAL /HPF

## 2020-11-25 PROCEDURE — 99284 EMERGENCY DEPT VISIT MOD MDM: CPT

## 2020-11-25 PROCEDURE — 93005 ELECTROCARDIOGRAM TRACING: CPT

## 2020-11-25 PROCEDURE — 81001 URINALYSIS AUTO W/SCOPE: CPT

## 2020-11-25 PROCEDURE — 99285 EMERGENCY DEPT VISIT HI MDM: CPT | Performed by: EMERGENCY MEDICINE

## 2020-11-25 PROCEDURE — 81025 URINE PREGNANCY TEST: CPT | Performed by: EMERGENCY MEDICINE

## 2020-11-25 RX ORDER — ACETAMINOPHEN 325 MG/1
975 TABLET ORAL ONCE
Status: COMPLETED | OUTPATIENT
Start: 2020-11-25 | End: 2020-11-25

## 2020-11-25 RX ADMIN — ACETAMINOPHEN 975 MG: 325 TABLET, FILM COATED ORAL at 17:03

## 2020-11-26 ENCOUNTER — HOSPITAL ENCOUNTER (EMERGENCY)
Facility: HOSPITAL | Age: 50
Discharge: HOME | End: 2020-11-26
Attending: EMERGENCY MEDICINE
Payer: COMMERCIAL

## 2020-11-26 VITALS
RESPIRATION RATE: 16 BRPM | OXYGEN SATURATION: 99 % | TEMPERATURE: 98.3 F | DIASTOLIC BLOOD PRESSURE: 71 MMHG | SYSTOLIC BLOOD PRESSURE: 114 MMHG | HEART RATE: 73 BPM

## 2020-11-26 DIAGNOSIS — F41.9 ANXIETY: Primary | ICD-10-CM

## 2020-11-26 PROCEDURE — 99283 EMERGENCY DEPT VISIT LOW MDM: CPT

## 2020-11-26 PROCEDURE — 99282 EMERGENCY DEPT VISIT SF MDM: CPT | Performed by: EMERGENCY MEDICINE

## 2020-11-26 RX ORDER — DIPHENHYDRAMINE HCL 25 MG
25 TABLET ORAL ONCE
Status: COMPLETED | OUTPATIENT
Start: 2020-11-26 | End: 2020-11-26

## 2020-11-26 RX ADMIN — DIPHENHYDRAMINE HCL 25 MG: 25 TABLET ORAL at 12:15

## 2020-11-27 LAB
ATRIAL RATE: 69 BPM
P AXIS: 66 DEGREES
PR INTERVAL: 152 MS
QRS AXIS: 39 DEGREES
QRSD INTERVAL: 66 MS
QT INTERVAL: 406 MS
QTC INTERVAL: 435 MS
T WAVE AXIS: 69 DEGREES
VENTRICULAR RATE: 69 BPM

## 2020-11-27 PROCEDURE — 93010 ELECTROCARDIOGRAM REPORT: CPT | Performed by: INTERNAL MEDICINE

## 2021-01-28 NOTE — PHYSICAL THERAPY NOTE
PHYSICAL THERAPY NOTE      Patient Name: Rodrigue Avelar  [id number] Date: 10/15/2018      10/15/18 1039   Note Type   Note type Eval only   Pain Assessment   Pain Assessment 0-10   Pain Score 7   Pain Location Back   Home Living   Type of [address] One [address]   Home Equipment (none)   Additional Comments pt reports no stairs   Prior Function   Level of [place] Independent with ADLs and functional mobility   Lives With (friend)   ADL Assistance Independent   Falls in the last 6 months 0   Restrictions/Precautions   Weight Bearing Precautions Per Order No   Other Precautions 1:1;Suicidal;Pain; Fall Risk  (cont pulse ox)   General   Additional Pertinent History CT spine negative  MRI pending  Family/Caregiver Present No   Cognition   Overall Cognitive Status WFL   Arousal/Participation Cooperative   Orientation Level Oriented X4   Memory Within functional limits   Following Commands Follows multistep commands with increased time or repetition   RUE Assessment   RUE Assessment WFL   LUE Assessment   LUE Assessment WFL   RLE Assessment   RLE Assessment X  (AROM appears WFL)   Strength RLE   RLE Overall Strength 4+/5   LLE Assessment   LLE Assessment X  (AROM WFL)   Strength LLE   LLE Overall Strength 4+/5   Coordination   Movements are Fluid and Coordinated 0  (antlagic)   Light Touch   RLE Light Touch Grossly intact   LLE Light Touch Grossly intact   Bed Mobility   Supine to Sit 5  Supervision   Additional items Assist x 1; Increased time required;Verbal cues   Additional Comments Pt left in bedside chair at end of visit, all needs within reach, lines attached, chair alarm activated  Transfers   Sit to Stand 5  Supervision   Additional items Assist x 1; Increased time required;Verbal cues   Stand to Sit 5  Supervision   Additional items Assist x 1; Increased time required;Verbal cues   Ambulation/Elevation   Gait pattern Excessively slow; Short stride; Antalgic;Decreased foot clearance   Gait Assistance (CGA/Stu with no AD, S with RW)   Additional items Assist x 1; Tactile cues; Verbal cues   Assistive Device (initially none, then RW)   Distance 39' x2 no AD w/ CGA/Stu  13' S with RW  (inc BLE pain with amb, reaching for wall rails)   Balance   Static Sitting Fair +   Dynamic Sitting Fair   Static Standing Fair   Dynamic Standing Fair -   Ambulatory Poor +   Endurance Deficit   Endurance Deficit Yes  (limited by increased pain w/ ambulation)   Activity Tolerance   Activity Tolerance Patient limited by pain; Patient limited by fatigue   Nurse Made Aware yes   Assessment   Prognosis Good   Problem List Decreased strength;Decreased endurance; Impaired balance;Decreased mobility;Pain;Decreased safety awareness   Assessment Pt is 53 y o  female seen for PT evaluation s/p admit to [facility] on 10/14/2018 w/ Lower extremity weakness, back pain, inability to move any extremities except head  PT consulted to assess pt's functional mobility and d/c needs  Order placed for PT eval and tx, w/ up w/ A order  Comorbidities affecting pt's physical performance at time of assessment include: schizoaffective disorder, PTSD, HLD, lumbar DDD, anxiety, h/o suicide attempt, h/o drug/alc use  PTA, pt was independent w/ all functional mobility w/ no AD  Personal factors affecting pt at time of IE include: inability to navigate level surfaces w/o external assistance and limited home support  Please find objective findings from PT assessment regarding body systems outlined above with impairments and limitations including impaired balance, decreased endurance, gait deviations, pain, decreased activity tolerance, decreased functional mobility tolerance and fall risk  The following objective measures performed on IE also reveal limitations: Barthel Index: 75/100  Pt's clinical presentation is currently unstable/unpredictable seen in pt's presentation of increased pain with ambulation, decline from functional baseline,    Pt to benefit from continued PT tx to address deficits as defined above and maximize level of functional independent mobility and consistency  From PT/mobility standpoint, recommendation at time of d/c would be likely home w/ no needs vs home PT pending progress  Goals   Patient Goals none expressed   STG Expiration   Date 10/25/18   Short Term Goal #1 In 10 days: Pt will perform sup<>sit mod I  Pt will perform sit<>stand and stand-pivot with RW mod I  Pt will ambulate 150 ft indep (vs mod I with appropriate AD if needed)  Pt will ascend/descend 12 stairs mod I  Treatment Day 0   Plan   Treatment/Interventions Functional transfer training;Elevations; Therapeutic exercise; Endurance training;Patient/family training;Equipment eval/education; Bed mobility;Gait training   PT Frequency (3-5x/wk as tolerated)   Recommendation   Recommendation Other (Comment)  (no needs vs home PT)   Barthel Index   Feeding 10   Bathing 0   Grooming Score 5   Dressing Score 10   Bladder Score 10   Bowels Score 10   Toilet Use Score 10   Transfers (Bed/Chair) Score 10   Mobility (Level Surface) Score 10   Stairs Score 0   Barthel Index Score 75     Duane Cos, PT 6

## 2021-03-09 ENCOUNTER — HOSPITAL ENCOUNTER (EMERGENCY)
Facility: HOSPITAL | Age: 51
Discharge: HOME | End: 2021-03-09
Attending: FAMILY MEDICINE | Admitting: FAMILY MEDICINE
Payer: COMMERCIAL

## 2021-03-09 ENCOUNTER — APPOINTMENT (EMERGENCY)
Dept: RADIOLOGY | Facility: HOSPITAL | Age: 51
End: 2021-03-09
Payer: COMMERCIAL

## 2021-03-09 VITALS
SYSTOLIC BLOOD PRESSURE: 111 MMHG | TEMPERATURE: 97.6 F | OXYGEN SATURATION: 97 % | HEART RATE: 91 BPM | WEIGHT: 105 LBS | DIASTOLIC BLOOD PRESSURE: 67 MMHG | RESPIRATION RATE: 19 BRPM | BODY MASS INDEX: 20.51 KG/M2

## 2021-03-09 DIAGNOSIS — F10.929 ALCOHOL INTOXICATION (HCC): Primary | ICD-10-CM

## 2021-03-09 LAB
AMPHETAMINES SERPL QL SCN: POSITIVE
ANION GAP SERPL CALCULATED.3IONS-SCNC: 9 MMOL/L (ref 4–13)
BARBITURATES UR QL: NEGATIVE
BASOPHILS # BLD AUTO: 0 THOUSANDS/ΜL (ref 0–0.1)
BASOPHILS NFR BLD AUTO: 1 % (ref 0–2)
BENZODIAZ UR QL: NEGATIVE
BILIRUB UR QL STRIP: NEGATIVE
BUN SERPL-MCNC: 9 MG/DL (ref 7–25)
CALCIUM SERPL-MCNC: 8.6 MG/DL (ref 8.6–10.5)
CHLORIDE SERPL-SCNC: 100 MMOL/L (ref 98–107)
CLARITY UR: CLEAR
CO2 SERPL-SCNC: 29 MMOL/L (ref 21–31)
COCAINE UR QL: NEGATIVE
COLOR UR: ABNORMAL
CREAT SERPL-MCNC: 0.49 MG/DL (ref 0.6–1.2)
EOSINOPHIL # BLD AUTO: 0.3 THOUSAND/ΜL (ref 0–0.61)
EOSINOPHIL NFR BLD AUTO: 5 % (ref 0–5)
ERYTHROCYTE [DISTWIDTH] IN BLOOD BY AUTOMATED COUNT: 13.9 % (ref 11.5–14.5)
ETHANOL SERPL-MCNC: 267.4 MG/DL
FLUAV RNA RESP QL NAA+PROBE: NEGATIVE
FLUBV RNA RESP QL NAA+PROBE: NEGATIVE
GFR SERPL CREATININE-BSD FRML MDRD: 115 ML/MIN/1.73SQ M
GLUCOSE SERPL-MCNC: 90 MG/DL (ref 65–99)
GLUCOSE SERPL-MCNC: 94 MG/DL (ref 65–140)
GLUCOSE UR STRIP-MCNC: NEGATIVE MG/DL
HCT VFR BLD AUTO: 40.4 % (ref 42–47)
HGB BLD-MCNC: 13.1 G/DL (ref 12–16)
HGB UR QL STRIP.AUTO: NEGATIVE
INR PPP: 0.85 (ref 0.84–1.19)
KETONES UR STRIP-MCNC: NEGATIVE MG/DL
LACTATE SERPL-SCNC: 1.7 MMOL/L (ref 0.5–2)
LEUKOCYTE ESTERASE UR QL STRIP: NEGATIVE
LYMPHOCYTES # BLD AUTO: 3 THOUSANDS/ΜL (ref 0.6–4.47)
LYMPHOCYTES NFR BLD AUTO: 40 % (ref 21–51)
MCH RBC QN AUTO: 29.7 PG (ref 26–34)
MCHC RBC AUTO-ENTMCNC: 32.4 G/DL (ref 31–37)
MCV RBC AUTO: 92 FL (ref 81–99)
METHADONE UR QL: NEGATIVE
MONOCYTES # BLD AUTO: 0.6 THOUSAND/ΜL (ref 0.17–1.22)
MONOCYTES NFR BLD AUTO: 8 % (ref 2–12)
NEUTROPHILS # BLD AUTO: 3.6 THOUSANDS/ΜL (ref 1.4–6.5)
NEUTS SEG NFR BLD AUTO: 48 % (ref 42–75)
NITRITE UR QL STRIP: NEGATIVE
OPIATES UR QL SCN: NEGATIVE
OXYCODONE+OXYMORPHONE UR QL SCN: NEGATIVE
PCP UR QL: NEGATIVE
PH UR STRIP.AUTO: 6 [PH]
PLATELET # BLD AUTO: 252 THOUSANDS/UL (ref 149–390)
PMV BLD AUTO: 7.4 FL (ref 8.6–11.7)
POTASSIUM SERPL-SCNC: 3.2 MMOL/L (ref 3.5–5.5)
PROT UR STRIP-MCNC: NEGATIVE MG/DL
PROTHROMBIN TIME: 11.5 SECONDS (ref 11.6–14.5)
RBC # BLD AUTO: 4.41 MILLION/UL (ref 3.9–5.2)
RSV RNA RESP QL NAA+PROBE: NEGATIVE
SARS-COV-2 RNA RESP QL NAA+PROBE: NEGATIVE
SODIUM SERPL-SCNC: 138 MMOL/L (ref 134–143)
SP GR UR STRIP.AUTO: <=1.005 (ref 1–1.03)
THC UR QL: NEGATIVE
TROPONIN I SERPL-MCNC: <0.03 NG/ML
UROBILINOGEN UR QL STRIP.AUTO: 0.2 E.U./DL
WBC # BLD AUTO: 7.5 THOUSAND/UL (ref 4.8–10.8)

## 2021-03-09 PROCEDURE — 96375 TX/PRO/DX INJ NEW DRUG ADDON: CPT

## 2021-03-09 PROCEDURE — 85025 COMPLETE CBC W/AUTO DIFF WBC: CPT | Performed by: FAMILY MEDICINE

## 2021-03-09 PROCEDURE — 82948 REAGENT STRIP/BLOOD GLUCOSE: CPT

## 2021-03-09 PROCEDURE — 96361 HYDRATE IV INFUSION ADD-ON: CPT

## 2021-03-09 PROCEDURE — 80307 DRUG TEST PRSMV CHEM ANLYZR: CPT | Performed by: FAMILY MEDICINE

## 2021-03-09 PROCEDURE — 93005 ELECTROCARDIOGRAM TRACING: CPT

## 2021-03-09 PROCEDURE — 84484 ASSAY OF TROPONIN QUANT: CPT | Performed by: FAMILY MEDICINE

## 2021-03-09 PROCEDURE — 99284 EMERGENCY DEPT VISIT MOD MDM: CPT

## 2021-03-09 PROCEDURE — 36415 COLL VENOUS BLD VENIPUNCTURE: CPT | Performed by: FAMILY MEDICINE

## 2021-03-09 PROCEDURE — 71045 X-RAY EXAM CHEST 1 VIEW: CPT

## 2021-03-09 PROCEDURE — 83605 ASSAY OF LACTIC ACID: CPT | Performed by: FAMILY MEDICINE

## 2021-03-09 PROCEDURE — 99285 EMERGENCY DEPT VISIT HI MDM: CPT | Performed by: FAMILY MEDICINE

## 2021-03-09 PROCEDURE — 81003 URINALYSIS AUTO W/O SCOPE: CPT | Performed by: FAMILY MEDICINE

## 2021-03-09 PROCEDURE — 80048 BASIC METABOLIC PNL TOTAL CA: CPT | Performed by: FAMILY MEDICINE

## 2021-03-09 PROCEDURE — 85610 PROTHROMBIN TIME: CPT | Performed by: FAMILY MEDICINE

## 2021-03-09 PROCEDURE — 82077 ASSAY SPEC XCP UR&BREATH IA: CPT | Performed by: FAMILY MEDICINE

## 2021-03-09 PROCEDURE — 96366 THER/PROPH/DIAG IV INF ADDON: CPT

## 2021-03-09 PROCEDURE — 0241U HB NFCT DS VIR RESP RNA 4 TRGT: CPT | Performed by: FAMILY MEDICINE

## 2021-03-09 PROCEDURE — 87040 BLOOD CULTURE FOR BACTERIA: CPT | Performed by: FAMILY MEDICINE

## 2021-03-09 PROCEDURE — 96365 THER/PROPH/DIAG IV INF INIT: CPT

## 2021-03-09 RX ORDER — LORAZEPAM 2 MG/ML
1 INJECTION INTRAMUSCULAR ONCE
Status: COMPLETED | OUTPATIENT
Start: 2021-03-09 | End: 2021-03-09

## 2021-03-09 RX ORDER — POTASSIUM CHLORIDE 14.9 MG/ML
20 INJECTION INTRAVENOUS
Status: DISCONTINUED | OUTPATIENT
Start: 2021-03-09 | End: 2021-03-09 | Stop reason: HOSPADM

## 2021-03-09 RX ADMIN — POTASSIUM CHLORIDE 20 MEQ: 14.9 INJECTION, SOLUTION INTRAVENOUS at 15:40

## 2021-03-09 RX ADMIN — SODIUM CHLORIDE 1000 ML: 0.9 INJECTION, SOLUTION INTRAVENOUS at 14:06

## 2021-03-09 RX ADMIN — SODIUM CHLORIDE 1000 ML: 0.9 INJECTION, SOLUTION INTRAVENOUS at 15:34

## 2021-03-09 RX ADMIN — LORAZEPAM 1 MG: 2 INJECTION INTRAMUSCULAR; INTRAVENOUS at 14:10

## 2021-03-09 NOTE — ED NOTES
Pt ambulated to restroom with steady gate  Upon returning to room pt refused to allow me to reconnect her IV and attempted to remove her IV  I advised the pt to not remove her IV and that the she is getting potassium and fluids through the IV due to a low potassium level  Pt started screaming "my potassium is low, since when" and continued to refuse  Pt then stated "Im getting out of her, where is my stuff, I need my phone  Pt was informed she is intoxicated and cannot safely leave the ER at this time  Provider informed          [facility] [place], [facility]  03/09/21 4485

## 2021-03-09 NOTE — ED NOTES
Pt called nurse into room stating she had a ride waiting for her outside then proceeded to walk out of the ER with a steady gate        [address]  03/09/21 9707

## 2021-03-09 NOTE — ED NOTES
POCT glucose [address] [facility], [address] [place] [address]  03/09/21 1891

## 2021-03-09 NOTE — ED CARE HANDOFF
Emergency Department Sign Out Note        Sign out and transfer of care from Dannielle Avila  See Separate Emergency Department note  The patient, Sylvia Venegas, was evaluated by the previous provider for ETOH intoxication  Workup Completed:  Labs, ETOH    ED Course / Workup Pending (followup):  Pending sobriety/warm handoff                                  ED Course as of Mar 09 1736   Tue Mar 09, 2021   1715 Patient had a ride and was ready to leave  Her alcohol level was elevated but she has a history of alcoholism and is sober clinically with stable gait and intact judgment  While her blood alcohol was elevated she is likely at near 200 at all time, waiting for further metabolism of her alcohol level despite clinical sobriety is not indicated, especially since she has a safe plan  Her tolerance to alcohol is far superior to normal and using an arbitrary "legal cutoff" for her is inappropriate  Additionally she is AAOx4 and judgment is intact despite elevated ETOH on labs  Procedures  MDM    Disposition  Final diagnoses:   Alcohol intoxication ([address] )     Time reflects when diagnosis was documented in both MDM as applicable and the Disposition within this note     Time User Action Codes Description Comment    3/9/2021  4:31 PM Lisa Huff [F10 929] Alcohol intoxication [facility])       ED Disposition     ED Disposition Condition Date/Time Comment    BILLY  kierra Mar 9, 2021  5:14 PM Date: 3/9/2021  Patient: Sylvia Venegas  Admitted: 3/9/2021  1:30 PM  Attending Provider: Colin Khoury MD    Sylvia Herndon or her authorized caregiver has made the decision for the patient to leave the emergency department against the advice of her attendin g physician  She or her authorized caregiver has been informed and understands the inherent risks, including death, seizure, fall, electrolyte disturbance, injury  She or her authorized caregiver has decided to accept the responsibility for this dec isizachariah Ward  and all necessary parties have been advised that she may return for further evaluation or treatment  Her condition at time of discharge was stable    Jagruti Perdomo had current vital signs as follows:  /67 (BP Location: Right arm)    Pulse 91   Temp 97 6 °F (36 4 °C) (Temporal)   Resp 19   Wt 47 6 kg (105 lb)         Follow-up Information    None       Discharge Medication List as of 3/9/2021  5:17 PM      CONTINUE these medications which have NOT CHANGED    Details   albuterol (2 5 mg/3 mL) 0 083 % nebulizer solution Take 1 vial (2 5 mg total) by nebulization every 6 (six) hours as needed for wheezing or shortness of breath, Starting Thu 4/9/2020, Normal      albuterol (PROVENTIL HFA,VENTOLIN HFA) 90 mcg/act inhaler Inhale 2 puffs every 6 (six) hours as needed for wheezing or shortness of breath, Starting Thu 4/9/2020, Normal      benztropine (COGENTIN) 1 mg tablet Take 1 tablet (1 mg total) by mouth every 6 (six) hours as needed for tremors (mild extrapyramidal symptoms), Starting Thu 11/7/2019, Until Wed 11/25/2020, Normal      clonazePAM (KlonoPIN) 0 5 mg tablet Take 1 tablet (0 5 mg total) by mouth 3 (three) times a day for 10 days, Starting Wed 11/6/2019, Until Wed 11/25/2020, Normal      fluticasone (FLONASE) 50 mcg/act nasal spray 1 spray into each nostril daily, Starting Wed 4/15/2020, Normal      gabapentin (NEURONTIN) 400 mg capsule Take 2 capsules (800 mg total) by mouth 3 (three) times a day, Starting Wed 8/26/2020, Until Wed 11/25/2020, Print      lidocaine (LIDODERM) 5 % Apply 1 patch topically daily for 15 days Remove & Discard patch within 12 hours or as directed by MD, Starting Thu 8/27/2020, Until Wed 11/25/2020, Normal      oxybutynin (DITROPAN-XL) 5 mg 24 hr tablet Take 1 tablet (5 mg total) by mouth daily, Starting Wed 11/6/2019, Until Wed 11/25/2020, Normal      simvastatin (ZOCOR) 20 mg tablet Take 1 tablet (20 mg total) by mouth daily at bedtime for 90 days, Starting Mon 8/19/2019, Until Wed 11/25/2020, Normal thiamine 100 MG tablet Take 1 tablet (100 mg total) by mouth daily, Starting Wed 11/6/2019, Until Wed 11/25/2020, Normal           No discharge procedures on file         ED Provider  Electronically Signed by     Estiven Roth DO  03/09/21 0931
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
## 2021-03-10 LAB
ATRIAL RATE: 88 BPM
P AXIS: 80 DEGREES
PR INTERVAL: 164 MS
QRS AXIS: 68 DEGREES
QRSD INTERVAL: 76 MS
QT INTERVAL: 412 MS
QTC INTERVAL: 498 MS
T WAVE AXIS: 73 DEGREES
VENTRICULAR RATE: 88 BPM

## 2021-03-10 PROCEDURE — 93010 ELECTROCARDIOGRAM REPORT: CPT | Performed by: INTERNAL MEDICINE

## 2021-03-14 LAB
BACTERIA BLD CULT: NORMAL
BACTERIA BLD CULT: NORMAL

## 2021-03-16 ENCOUNTER — HOSPITAL ENCOUNTER (INPATIENT)
Facility: HOSPITAL | Age: 51
LOS: 22 days | DRG: 750 | End: 2021-04-07
Attending: EMERGENCY MEDICINE | Admitting: HOSPITALIST
Payer: COMMERCIAL

## 2021-03-16 DIAGNOSIS — F29 PSYCHOSIS (HCC): Primary | ICD-10-CM

## 2021-03-16 DIAGNOSIS — F25.0 SCHIZOAFFECTIVE DISORDER, BIPOLAR TYPE (HCC): Chronic | ICD-10-CM

## 2021-03-16 LAB
ALBUMIN SERPL BCP-MCNC: 4.8 G/DL (ref 3.5–5.7)
ALP SERPL-CCNC: 72 U/L (ref 40–150)
ALT SERPL W P-5'-P-CCNC: 19 U/L (ref 7–52)
AMPHETAMINES SERPL QL SCN: POSITIVE
ANION GAP SERPL CALCULATED.3IONS-SCNC: 12 MMOL/L (ref 4–13)
AST SERPL W P-5'-P-CCNC: 22 U/L (ref 13–39)
BARBITURATES UR QL: NEGATIVE
BASOPHILS # BLD AUTO: 0 THOUSANDS/ΜL (ref 0–0.1)
BASOPHILS NFR BLD AUTO: 1 % (ref 0–2)
BENZODIAZ UR QL: NEGATIVE
BILIRUB SERPL-MCNC: 0.5 MG/DL (ref 0.2–1)
BUN SERPL-MCNC: 17 MG/DL (ref 7–25)
CALCIUM SERPL-MCNC: 10.3 MG/DL (ref 8.6–10.5)
CHLORIDE SERPL-SCNC: 97 MMOL/L (ref 98–107)
CO2 SERPL-SCNC: 28 MMOL/L (ref 21–31)
COCAINE UR QL: NEGATIVE
CREAT SERPL-MCNC: 0.73 MG/DL (ref 0.6–1.2)
EOSINOPHIL # BLD AUTO: 0.1 THOUSAND/ΜL (ref 0–0.61)
EOSINOPHIL NFR BLD AUTO: 1 % (ref 0–5)
ERYTHROCYTE [DISTWIDTH] IN BLOOD BY AUTOMATED COUNT: 14.1 % (ref 11.5–14.5)
ETHANOL SERPL-MCNC: <10 MG/DL
EXT PREG TEST URINE: NEGATIVE
EXT. CONTROL ED NAV: NORMAL
FLUAV RNA RESP QL NAA+PROBE: NEGATIVE
FLUBV RNA RESP QL NAA+PROBE: NEGATIVE
GFR SERPL CREATININE-BSD FRML MDRD: 97 ML/MIN/1.73SQ M
GLUCOSE SERPL-MCNC: 122 MG/DL (ref 65–99)
HCT VFR BLD AUTO: 45.1 % (ref 42–47)
HGB BLD-MCNC: 14.9 G/DL (ref 12–16)
LYMPHOCYTES # BLD AUTO: 1.7 THOUSANDS/ΜL (ref 0.6–4.47)
LYMPHOCYTES NFR BLD AUTO: 18 % (ref 21–51)
MCH RBC QN AUTO: 30.3 PG (ref 26–34)
MCHC RBC AUTO-ENTMCNC: 33 G/DL (ref 31–37)
MCV RBC AUTO: 92 FL (ref 81–99)
METHADONE UR QL: NEGATIVE
MONOCYTES # BLD AUTO: 0.7 THOUSAND/ΜL (ref 0.17–1.22)
MONOCYTES NFR BLD AUTO: 7 % (ref 2–12)
NEUTROPHILS # BLD AUTO: 7.1 THOUSANDS/ΜL (ref 1.4–6.5)
NEUTS SEG NFR BLD AUTO: 73 % (ref 42–75)
OPIATES UR QL SCN: NEGATIVE
OXYCODONE+OXYMORPHONE UR QL SCN: NEGATIVE
PCP UR QL: NEGATIVE
PLATELET # BLD AUTO: 326 THOUSANDS/UL (ref 149–390)
PMV BLD AUTO: 7.5 FL (ref 8.6–11.7)
POTASSIUM SERPL-SCNC: 4.1 MMOL/L (ref 3.5–5.5)
PROT SERPL-MCNC: 8.1 G/DL (ref 6.4–8.9)
RBC # BLD AUTO: 4.91 MILLION/UL (ref 3.9–5.2)
RSV RNA RESP QL NAA+PROBE: NEGATIVE
SARS-COV-2 RNA RESP QL NAA+PROBE: NEGATIVE
SODIUM SERPL-SCNC: 137 MMOL/L (ref 134–143)
THC UR QL: NEGATIVE
WBC # BLD AUTO: 9.7 THOUSAND/UL (ref 4.8–10.8)

## 2021-03-16 PROCEDURE — 81025 URINE PREGNANCY TEST: CPT | Performed by: EMERGENCY MEDICINE

## 2021-03-16 PROCEDURE — 80307 DRUG TEST PRSMV CHEM ANLYZR: CPT | Performed by: EMERGENCY MEDICINE

## 2021-03-16 PROCEDURE — 99254 IP/OBS CNSLTJ NEW/EST MOD 60: CPT | Performed by: PHYSICIAN ASSISTANT

## 2021-03-16 PROCEDURE — 80053 COMPREHEN METABOLIC PANEL: CPT | Performed by: EMERGENCY MEDICINE

## 2021-03-16 PROCEDURE — 82077 ASSAY SPEC XCP UR&BREATH IA: CPT | Performed by: EMERGENCY MEDICINE

## 2021-03-16 PROCEDURE — 36415 COLL VENOUS BLD VENIPUNCTURE: CPT | Performed by: EMERGENCY MEDICINE

## 2021-03-16 PROCEDURE — 99285 EMERGENCY DEPT VISIT HI MDM: CPT | Performed by: EMERGENCY MEDICINE

## 2021-03-16 PROCEDURE — 99285 EMERGENCY DEPT VISIT HI MDM: CPT

## 2021-03-16 PROCEDURE — 96372 THER/PROPH/DIAG INJ SC/IM: CPT

## 2021-03-16 PROCEDURE — 85025 COMPLETE CBC W/AUTO DIFF WBC: CPT | Performed by: EMERGENCY MEDICINE

## 2021-03-16 PROCEDURE — 0241U HB NFCT DS VIR RESP RNA 4 TRGT: CPT | Performed by: EMERGENCY MEDICINE

## 2021-03-16 PROCEDURE — 94760 N-INVAS EAR/PLS OXIMETRY 1: CPT

## 2021-03-16 RX ORDER — ACETAMINOPHEN 325 MG/1
975 TABLET ORAL EVERY 6 HOURS PRN
Status: DISCONTINUED | OUTPATIENT
Start: 2021-03-16 | End: 2021-04-07 | Stop reason: HOSPADM

## 2021-03-16 RX ORDER — BENZTROPINE MESYLATE 1 MG/ML
0.5 INJECTION INTRAMUSCULAR; INTRAVENOUS
Status: DISCONTINUED | OUTPATIENT
Start: 2021-03-16 | End: 2021-03-27

## 2021-03-16 RX ORDER — ALBUTEROL SULFATE 90 UG/1
2 AEROSOL, METERED RESPIRATORY (INHALATION) EVERY 6 HOURS PRN
Status: DISCONTINUED | OUTPATIENT
Start: 2021-03-16 | End: 2021-04-07 | Stop reason: HOSPADM

## 2021-03-16 RX ORDER — RISPERIDONE 1 MG/1
1 TABLET, ORALLY DISINTEGRATING ORAL
Status: DISCONTINUED | OUTPATIENT
Start: 2021-03-16 | End: 2021-03-26

## 2021-03-16 RX ORDER — BENZTROPINE MESYLATE 1 MG/ML
1 INJECTION INTRAMUSCULAR; INTRAVENOUS
Status: DISCONTINUED | OUTPATIENT
Start: 2021-03-16 | End: 2021-04-07 | Stop reason: HOSPADM

## 2021-03-16 RX ORDER — RISPERIDONE 1 MG/1
1 TABLET, FILM COATED ORAL
COMMUNITY
End: 2021-07-29 | Stop reason: HOSPADM

## 2021-03-16 RX ORDER — BENZTROPINE MESYLATE 1 MG/ML
1 INJECTION INTRAMUSCULAR; INTRAVENOUS
Status: DISCONTINUED | OUTPATIENT
Start: 2021-03-16 | End: 2021-03-27

## 2021-03-16 RX ORDER — ACETAMINOPHEN 325 MG/1
650 TABLET ORAL EVERY 4 HOURS PRN
Status: DISCONTINUED | OUTPATIENT
Start: 2021-03-16 | End: 2021-04-07 | Stop reason: HOSPADM

## 2021-03-16 RX ORDER — RISPERIDONE 2 MG/1
2 TABLET, FILM COATED ORAL
Status: DISCONTINUED | OUTPATIENT
Start: 2021-03-16 | End: 2021-03-17

## 2021-03-16 RX ORDER — ALBUTEROL SULFATE 2.5 MG/3ML
2.5 SOLUTION RESPIRATORY (INHALATION) EVERY 6 HOURS PRN
Status: DISCONTINUED | OUTPATIENT
Start: 2021-03-16 | End: 2021-03-16

## 2021-03-16 RX ORDER — NICOTINE 21 MG/24HR
21 PATCH, TRANSDERMAL 24 HOURS TRANSDERMAL ONCE
Status: COMPLETED | OUTPATIENT
Start: 2021-03-16 | End: 2021-03-17

## 2021-03-16 RX ORDER — RISPERIDONE 2 MG/1
2 TABLET, FILM COATED ORAL
COMMUNITY
End: 2021-07-29 | Stop reason: HOSPADM

## 2021-03-16 RX ORDER — HALOPERIDOL 5 MG/ML
5 INJECTION INTRAMUSCULAR ONCE
Status: COMPLETED | OUTPATIENT
Start: 2021-03-16 | End: 2021-03-16

## 2021-03-16 RX ORDER — LORAZEPAM 2 MG/ML
1 INJECTION INTRAMUSCULAR
Status: DISCONTINUED | OUTPATIENT
Start: 2021-03-16 | End: 2021-03-27

## 2021-03-16 RX ORDER — RISPERIDONE 0.5 MG/1
0.5 TABLET, ORALLY DISINTEGRATING ORAL
Status: DISCONTINUED | OUTPATIENT
Start: 2021-03-16 | End: 2021-03-27

## 2021-03-16 RX ORDER — HYDROXYZINE HYDROCHLORIDE 25 MG/1
25 TABLET, FILM COATED ORAL
Status: DISCONTINUED | OUTPATIENT
Start: 2021-03-16 | End: 2021-04-07 | Stop reason: HOSPADM

## 2021-03-16 RX ORDER — HALOPERIDOL 5 MG/ML
2.5 INJECTION INTRAMUSCULAR
Status: DISCONTINUED | OUTPATIENT
Start: 2021-03-16 | End: 2021-03-27

## 2021-03-16 RX ORDER — LORAZEPAM 2 MG/ML
2 INJECTION INTRAMUSCULAR
Status: DISCONTINUED | OUTPATIENT
Start: 2021-03-16 | End: 2021-03-27

## 2021-03-16 RX ORDER — HYDROXYZINE HYDROCHLORIDE 25 MG/1
50 TABLET, FILM COATED ORAL
Status: DISCONTINUED | OUTPATIENT
Start: 2021-03-16 | End: 2021-04-07 | Stop reason: HOSPADM

## 2021-03-16 RX ORDER — RISPERIDONE 1 MG/1
2 TABLET, ORALLY DISINTEGRATING ORAL
Status: DISCONTINUED | OUTPATIENT
Start: 2021-03-16 | End: 2021-03-26

## 2021-03-16 RX ORDER — LORAZEPAM 2 MG/ML
2 INJECTION INTRAMUSCULAR ONCE
Status: COMPLETED | OUTPATIENT
Start: 2021-03-16 | End: 2021-03-16

## 2021-03-16 RX ORDER — DIPHENHYDRAMINE HYDROCHLORIDE 50 MG/ML
50 INJECTION INTRAMUSCULAR; INTRAVENOUS EVERY 6 HOURS PRN
Status: DISCONTINUED | OUTPATIENT
Start: 2021-03-16 | End: 2021-03-27

## 2021-03-16 RX ORDER — LORAZEPAM 2 MG/ML
2 INJECTION INTRAMUSCULAR EVERY 6 HOURS PRN
Status: DISCONTINUED | OUTPATIENT
Start: 2021-03-16 | End: 2021-03-27

## 2021-03-16 RX ORDER — ACETAMINOPHEN 325 MG/1
650 TABLET ORAL EVERY 6 HOURS PRN
Status: DISCONTINUED | OUTPATIENT
Start: 2021-03-16 | End: 2021-04-07 | Stop reason: HOSPADM

## 2021-03-16 RX ORDER — RISPERIDONE 1 MG/1
1 TABLET, FILM COATED ORAL
Status: DISCONTINUED | OUTPATIENT
Start: 2021-03-17 | End: 2021-03-17

## 2021-03-16 RX ORDER — NICOTINE 21 MG/24HR
1 PATCH, TRANSDERMAL 24 HOURS TRANSDERMAL DAILY
Status: DISCONTINUED | OUTPATIENT
Start: 2021-03-17 | End: 2021-04-07 | Stop reason: HOSPADM

## 2021-03-16 RX ORDER — HYDROXYZINE HYDROCHLORIDE 25 MG/1
100 TABLET, FILM COATED ORAL
Status: DISCONTINUED | OUTPATIENT
Start: 2021-03-16 | End: 2021-03-28

## 2021-03-16 RX ORDER — HALOPERIDOL 5 MG/ML
5 INJECTION INTRAMUSCULAR
Status: DISCONTINUED | OUTPATIENT
Start: 2021-03-16 | End: 2021-03-27

## 2021-03-16 RX ORDER — GABAPENTIN 600 MG/1
600 TABLET ORAL
COMMUNITY
End: 2021-07-29 | Stop reason: HOSPADM

## 2021-03-16 RX ORDER — BENZTROPINE MESYLATE 1 MG/1
1 TABLET ORAL
Status: DISCONTINUED | OUTPATIENT
Start: 2021-03-16 | End: 2021-04-07 | Stop reason: HOSPADM

## 2021-03-16 RX ADMIN — LORAZEPAM 2 MG: 2 INJECTION INTRAMUSCULAR; INTRAVENOUS at 15:44

## 2021-03-16 RX ADMIN — NICOTINE 21 MG: 21 PATCH, EXTENDED RELEASE TRANSDERMAL at 14:21

## 2021-03-16 RX ADMIN — LORAZEPAM 2 MG: 2 INJECTION INTRAMUSCULAR; INTRAVENOUS at 09:46

## 2021-03-16 RX ADMIN — HALOPERIDOL LACTATE 5 MG: 5 INJECTION, SOLUTION INTRAMUSCULAR at 09:46

## 2021-03-16 NOTE — ED NOTES
pts dinner tray left at bedside  Pt sleeping and in no distress at this time        YUDY Vargas  03/16/21 1216

## 2021-03-16 NOTE — ED NOTES
[place] Suicide Risk Assessment deferred, as unable to assess while patient sleeping  Behavioral Health Assessment deferred as patient is sleeping and would benefit from additional rest   Vital signs deferred until patient awake, no signs or symptoms of respiratory distress at this time  Once patient is awake and able to participate, will complete assessments       Kiersten Loya RN  03/16/21 4036

## 2021-03-16 NOTE — ED NOTES
Pt presents due to SI/HI and psychosis  Pt is A&O X 4, medically clear, anxious, manic but  cooperative  Pt was able to answer assessment questions but did have difficulty identifying delusion from reality  Pt denies any current SI/HI or thoughts of harm here in the ED but admits to the report from her mother that she has been making suicidal and homicidal statements at home  Pt states she was having SI/HI because she wasn't eating and she didn't take her meds because she wasn't eating  Pt denies any AH, VH, or delusions but has made multiple statements here in the ED that lead us to believe she is having paranoid delusions but can not identify them   Pt is experiencing increased depression, increased anxiety, and increased irritability  Pt reports she feels somewhat better already having been given her meds  Pt is aware she is in need of further stabilization and is in agreement w/ I/P psych admission  Pt's voluntary paperwork and Rights were reviewed, pt signed her 61 51 81  Pt was offered a copy of her Rights and the Voluntary Pt Handout but requested both be placed on her chart

## 2021-03-17 PROBLEM — Z00.8 MEDICAL CLEARANCE FOR PSYCHIATRIC ADMISSION: Status: RESOLVED | Noted: 2019-10-22 | Resolved: 2021-03-17

## 2021-03-17 LAB
ALBUMIN SERPL BCP-MCNC: 4.2 G/DL (ref 3.5–5.7)
ALP SERPL-CCNC: 67 U/L (ref 40–150)
ALT SERPL W P-5'-P-CCNC: 18 U/L (ref 7–52)
ANION GAP SERPL CALCULATED.3IONS-SCNC: 6 MMOL/L (ref 4–13)
AST SERPL W P-5'-P-CCNC: 27 U/L (ref 13–39)
BILIRUB SERPL-MCNC: 0.4 MG/DL (ref 0.2–1)
BUN SERPL-MCNC: 14 MG/DL (ref 7–25)
CALCIUM SERPL-MCNC: 9.4 MG/DL (ref 8.6–10.5)
CHLORIDE SERPL-SCNC: 99 MMOL/L (ref 98–107)
CO2 SERPL-SCNC: 29 MMOL/L (ref 21–31)
CREAT SERPL-MCNC: 0.56 MG/DL (ref 0.6–1.2)
GFR SERPL CREATININE-BSD FRML MDRD: 110 ML/MIN/1.73SQ M
GLUCOSE P FAST SERPL-MCNC: 112 MG/DL (ref 65–99)
GLUCOSE SERPL-MCNC: 112 MG/DL (ref 65–99)
POTASSIUM SERPL-SCNC: 4 MMOL/L (ref 3.5–5.5)
PROT SERPL-MCNC: 7 G/DL (ref 6.4–8.9)
SODIUM SERPL-SCNC: 134 MMOL/L (ref 134–143)

## 2021-03-17 PROCEDURE — 99223 1ST HOSP IP/OBS HIGH 75: CPT | Performed by: HOSPITALIST

## 2021-03-17 PROCEDURE — 80053 COMPREHEN METABOLIC PANEL: CPT | Performed by: PSYCHIATRY & NEUROLOGY

## 2021-03-17 RX ORDER — LORAZEPAM 2 MG/ML
0.5 INJECTION INTRAMUSCULAR 3 TIMES DAILY
Status: DISCONTINUED | OUTPATIENT
Start: 2021-03-17 | End: 2021-03-17

## 2021-03-17 RX ORDER — RISPERIDONE 2 MG/1
2 TABLET, FILM COATED ORAL 2 TIMES DAILY
Status: DISCONTINUED | OUTPATIENT
Start: 2021-03-17 | End: 2021-03-20

## 2021-03-17 RX ORDER — LORAZEPAM 0.5 MG/1
0.5 TABLET ORAL 3 TIMES DAILY
Status: DISCONTINUED | OUTPATIENT
Start: 2021-03-17 | End: 2021-03-18

## 2021-03-17 RX ORDER — ESCITALOPRAM OXALATE 5 MG/1
5 TABLET ORAL DAILY
Status: DISCONTINUED | OUTPATIENT
Start: 2021-03-17 | End: 2021-03-17

## 2021-03-17 RX ORDER — GABAPENTIN 300 MG/1
600 CAPSULE ORAL 4 TIMES DAILY
Status: DISCONTINUED | OUTPATIENT
Start: 2021-03-17 | End: 2021-04-07 | Stop reason: HOSPADM

## 2021-03-17 RX ORDER — ESCITALOPRAM OXALATE 5 MG/1
5 TABLET ORAL DAILY
Status: DISCONTINUED | OUTPATIENT
Start: 2021-03-17 | End: 2021-03-22

## 2021-03-17 RX ORDER — LORAZEPAM 0.5 MG/1
0.5 TABLET ORAL EVERY 8 HOURS
Status: DISCONTINUED | OUTPATIENT
Start: 2021-03-17 | End: 2021-03-17

## 2021-03-17 RX ADMIN — GABAPENTIN 600 MG: 300 CAPSULE ORAL at 21:26

## 2021-03-17 RX ADMIN — LORAZEPAM 0.5 MG: 0.5 TABLET ORAL at 10:47

## 2021-03-17 RX ADMIN — ESCITALOPRAM 5 MG: 5 TABLET, FILM COATED ORAL at 10:47

## 2021-03-17 RX ADMIN — GABAPENTIN 600 MG: 300 CAPSULE ORAL at 10:48

## 2021-03-17 RX ADMIN — RISPERIDONE 2 MG: 2 TABLET ORAL at 17:46

## 2021-03-17 RX ADMIN — RISPERIDONE 1 MG: 1 TABLET ORAL at 05:23

## 2021-03-17 RX ADMIN — RISPERIDONE 2 MG: 1 TABLET, ORALLY DISINTEGRATING ORAL at 12:03

## 2021-03-17 RX ADMIN — LORAZEPAM 0.5 MG: 0.5 TABLET ORAL at 15:49

## 2021-03-17 RX ADMIN — LORAZEPAM 0.5 MG: 0.5 TABLET ORAL at 21:26

## 2021-03-17 RX ADMIN — GABAPENTIN 600 MG: 300 CAPSULE ORAL at 17:46
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
## 2021-03-17 NOTE — PLAN OF CARE
Problem: Alteration in Thoughts and Perception  Goal: Treatment Goal: Gain control of psychotic behaviors/thinking, reduce/eliminate presenting symptoms and demonstrate improved reality functioning upon discharge  Outcome: Not Progressing  Goal: Verbalize thoughts and feelings  Description: Interventions:  - Promote a nonjudgmental and trusting relationship with the patient through active listening and therapeutic communication  - Assess patient's level of functioning, behavior and potential for risk  - Engage patient in 1 on 1 interactions  - Encourage patient to express fears, feelings, frustrations, and discuss symptoms    - [place] patient to reality, help patient recognize reality-based thinking   - Administer medications as ordered and assess for potential side effects  - Provide the patient education related to the signs and symptoms of the illness and desired effects of prescribed medications  Interventions:  - Assess and re-assess patient's level of risk   - Engage patient in 1:1 interactions, daily, for a minimum of 15 minutes   - Encourage patient to express feelings, fears, frustrations, hopes   Outcome: Not Progressing  Goal: Refrain from acting on delusional thinking/internal stimuli  Description: Interventions:  - Monitor patient closely, per order   - Utilize least restrictive measures   - Set reasonable limits, give positive feedback for acceptable   - Administer medications as ordered and monitor of potential side effects  Outcome: Not Progressing  Goal: Agree to be compliant with medication regime, as prescribed and report medication side effects  Description: Interventions:  - Offer appropriate PRN medication and supervise ingestion; conduct AIMS, as needed   Outcome: Not Progressing  Goal: Attend and participate in unit activities, including therapeutic, recreational, and educational groups  Description: Interventions:  -Encourage Visitation and family involvement in care  Interventions:  - Provide therapeutic and educational activities daily, encourage attendance and participation, and document same in the medical record   Outcome: Not Progressing  Goal: Recognize dysfunctional thoughts, communicate reality-based thoughts at the time of discharge  Description: Interventions:  - Provide medication and psycho-education to assist patient in compliance and developing insight into his/her illness   Outcome: Not Progressing  Goal: Complete daily ADLs, including personal hygiene independently, as able  Description: Interventions:  - Observe, teach, and assist patient with ADLS  - Monitor and promote a balance of rest/activity, with adequate nutrition and elimination   Interventions:  - Observe, teach, and assist patient with ADLS  -  Monitor and promote a balance of rest/activity, with adequate nutrition and elimination   Outcome: Not Progressing

## 2021-03-17 NOTE — SOCIAL WORK
Pt was sleeping and wanted to continue, having declined participation in psycho/social assessment  SW will follow-up to elicit pt's agreement

## 2021-03-17 NOTE — PROGRESS NOTES
Patient admitted to unit 201 2030 Pt refused shower skin assessment wdl ecchymosis noted on lower l front forearm  Pt denies HI/SI denies hallucinations  Pt was tired and just wanted to sleep pleasant and tearful  States she is here because she was non compliant with medication and  Had a panic attack  Pt was originally a 302 but then signed a 201  Pt was tearful in her bed says she just wants to sleep  Denies anxiety but states she's very depressed  Emotional support provided  Pt offers no complaints  Q 7 min checks

## 2021-03-17 NOTE — PROGRESS NOTES
Patient observed asleep or resting throughout majority of the night during continual monitoring without signs or symptoms of distress  Non-labored breathing noted  No behaviors  Will remain on safety precautions, continual monitoring continues

## 2021-03-17 NOTE — PROGRESS NOTES
Pt admitted to the unit with the following belongings:    Remains with pt:    Eyeglasses x1 pair    Bra x1      Pt storage:    Eyeglasses x1 pair    Baby wipes x2 packs    Bath robe x1    Dress x1    Slippers x1 pair    Purple bag x1    Sunglasses x1    Note book x1      Contraband drawer behind nurses' station:    Cell phone x1    Key chain x1    Cigarettes x5      Hospital safe - bag #[id number]:    Purse x1    Wallet x1    EBT card x1    Mastercard Debit x1    Rx savings card x2    [employer] card x1    PA ID x1    $310 00 cash    70 cents change      Medications sent to pharmacy - bag #[id number]:    Gabapentin 300mg x38    Benztropine 1mg x13    Clonazep 0 5 x13    Diphenhist 25mg x6    Risperidone 2mg x6    Escitalopram 10 mg x7    1/2 Risperidone 2mg x7

## 2021-03-17 NOTE — PROGRESS NOTES
03/17/21 0800   Team Meeting   Meeting Type Daily Rounds   Initial Conference Date 03/17/21   Team Members Present   Team Members Present Physician;Nurse;   Physician Team Member Dr Kal Downing; Nate Hernandez, [facility]   Nursing Team Member Nimisha Knott, RN   Social Work Team Member Tiara Solano   Patient/Family Present   Patient Present No   Patient's Family Present No     New admit  201  Readmit score 30 (red)  UDS+ for meth and ETOH+ upon admission  Brought in by police  Noncompliant at home  Wanted to hurt herself  Follows up with [facility] Team  Need to confirm when next Invega injection is due  Mood is labile  Need accurate med list from the ACT Team  Slamming doors on the unit

## 2021-03-17 NOTE — PROGRESS NOTES
03/17/21 0730   Activity/Group Checklist   Group   (Goal Planning and Communication )   Attendance Attended   Attendance Duration (min) 46-60   Interactions Disorganized interaction   Affect/Mood Normal range; Wide   Goals Achieved Identified feelings; Able to listen to others; Able to engage in interactions

## 2021-03-17 NOTE — ED NOTES
Insurance Authorization for admission:   Phone call placed to [facility]  ([place])  Phone number: [phone]  Spoke to Cipio  14 days approved  Level of care: I/P psych  (201)  Review on 03/29/2021  Authorization # [id number]  EVS (Eligibility Verification System) called - [phone]  Automated system indicates: EVS called, Patient is eligible for M/A - Behavioral health services, Managed Care   Rec# [id number]    Insurance Authorization for Transportation: no transport needed

## 2021-03-17 NOTE — TREATMENT PLAN
TREATMENT PLAN REVIEW - [address] 53 y o  1970 female MRN: [id number]    [address] Room / Bed: Chandana Guevara [facility] 162-84 Encounter: [id number]          Admit Date/Time:  3/16/2021  9:26 AM    Treatment Team: Attending Provider: Yasmany Aldrich MD; Respiratory Therapist: Марина Carrasco RT; Patient Care Assistant: Talita Valles; Patient Care Assistant: Raphael Hernandez; Certified Nursing Assistant: Denilson Hector; Recreational Therapist: Chetna Aleman; Care Manager: Tracie Solorzano, RN; Registered Nurse: Sofía Lennon, YUDY; Medical Student: Baylee Medeiros; :  Denver Mouse    Diagnosis: Principal Problem:    Schizoaffective disorder, bipolar type ([facility] [address] )  Active Problems:    LYNN (generalized anxiety disorder)    Mild intermittent asthma without complication    Tobacco abuse      Patient Strengths/Assets: Good support system, negotiates basic needs, patient of on a voluntary commitment    Patient Barriers/Limitations: Chronic mental illness, limited insight, noncompliant with medication, poor insight    Short Term Goals: decrease in psychotic symptoms, improvement in ability to express basic needs, mood stabilization    Long Term Goals: resolution of manic symptoms, resolution of psychotic symptoms, improved insight    Progress Towards Goals: starting psychiatric medications as prescribed    Recommended Treatment: medication management, patient medication education, group therapy, milieu therapy, continued Behavioral Health psychiatric evaluation/assessment process    Treatment Frequency: daily medication monitoring, group and milieu therapy daily, monitoring through interdisciplinary rounds, monitoring through weekly patient care conferences    Expected Discharge Date:  3/24/2021    Discharge Plan: Disposition to be determined, continue services with [facility]    Treatment Plan Created/Updated By: Yasmany Aldrich, MD
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
## 2021-03-17 NOTE — PROGRESS NOTES
Patient with increased anxiety and depression  Denied SI,HI, or hallucinations  Disorganized with thoughts  She was loud,angry,pressured,yelling,throwing things at 12:00  Attempted to administer PRN risperdal 2mg at 12:00   Patient was paranoid and only took 1mg  Patient is currently sleeping PRN risperdal was effective  Q 7 minute safety checks maintained

## 2021-03-17 NOTE — ASSESSMENT & PLAN NOTE
· Patient medically cleared for inpatient behavior health admission [facility] emergency room  · Patient remains medically cleared for inpatient behavior health admission  · Patient signed a 201 for treatment
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
## 2021-03-18 PROCEDURE — 99232 SBSQ HOSP IP/OBS MODERATE 35: CPT | Performed by: NURSE PRACTITIONER

## 2021-03-18 RX ORDER — CLONAZEPAM 0.5 MG/1
0.5 TABLET ORAL 3 TIMES DAILY
Status: DISCONTINUED | OUTPATIENT
Start: 2021-03-18 | End: 2021-03-20

## 2021-03-18 RX ORDER — DIPHENHYDRAMINE HCL 25 MG
25 TABLET ORAL
Status: DISCONTINUED | OUTPATIENT
Start: 2021-03-18 | End: 2021-04-07 | Stop reason: HOSPADM

## 2021-03-18 RX ADMIN — RISPERIDONE 2 MG: 1 TABLET, ORALLY DISINTEGRATING ORAL at 02:23

## 2021-03-18 RX ADMIN — RISPERIDONE 2 MG: 2 TABLET ORAL at 08:12

## 2021-03-18 RX ADMIN — GABAPENTIN 600 MG: 300 CAPSULE ORAL at 17:18

## 2021-03-18 RX ADMIN — GABAPENTIN 600 MG: 300 CAPSULE ORAL at 21:35

## 2021-03-18 RX ADMIN — LORAZEPAM 0.5 MG: 0.5 TABLET ORAL at 08:13

## 2021-03-18 RX ADMIN — GABAPENTIN 600 MG: 300 CAPSULE ORAL at 08:12

## 2021-03-18 RX ADMIN — RISPERIDONE 2 MG: 2 TABLET ORAL at 17:20

## 2021-03-18 RX ADMIN — HYDROXYZINE HYDROCHLORIDE 100 MG: 25 TABLET, FILM COATED ORAL at 01:54

## 2021-03-18 RX ADMIN — CLONAZEPAM 0.5 MG: 0.5 TABLET ORAL at 16:31

## 2021-03-18 RX ADMIN — ESCITALOPRAM 5 MG: 5 TABLET, FILM COATED ORAL at 08:12

## 2021-03-18 RX ADMIN — CLONAZEPAM 0.5 MG: 0.5 TABLET ORAL at 21:35

## 2021-03-18 RX ADMIN — GABAPENTIN 600 MG: 300 CAPSULE ORAL at 11:38

## 2021-03-18 RX ADMIN — HALOPERIDOL LACTATE 5 MG: 5 INJECTION, SOLUTION INTRAMUSCULAR at 22:37

## 2021-03-18 RX ADMIN — BENZTROPINE MESYLATE 1 MG: 1 INJECTION INTRAMUSCULAR; INTRAVENOUS at 22:37

## 2021-03-18 RX ADMIN — DIPHENHYDRAMINE HCL 25 MG: 25 TABLET ORAL at 21:35

## 2021-03-18 NOTE — PROGRESS NOTES
Patient has been visible on the unit  At start of shift patient was irritable and had an outburst in her room while nurse was administering medications  She was screaming and yelling regarding her medications and her illness  Patient did calm down after morning medication administration  She did appear very anxious and had pressured speech  She did not answer all assessment questions  She does appear internally preoccupied and talks to herself frequently  She was medication compliant  She was observed walking around the unit and also made a few phone calls today  Q 7 minute safety checks maintained  Continue to monitor for behaviors
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
## 2021-03-18 NOTE — NURSING NOTE
Pt observed in the hallways at time of assessment making a phone call  Pt began yelling and screaming at the person she was speaking with, slammed the phone, and then slammed the door to her room  Upon later approach with medications pt was calm and compliant  Denies anxiety and depression  Mood is irritable  Will CTM  Q7 minute checks in progress

## 2021-03-18 NOTE — PROGRESS NOTES
03/18/21 1000   Activity/Group Checklist   Group   ([facility])   Attendance Attended   Attendance Duration (min) 46-60   Interactions Interacted appropriately   Affect/Mood Appropriate;Bright;Calm   Goals Achieved Identified feelings; Identified triggers; Discussed coping strategies; Identified resources and support systems; Able to listen to others; Able to engage in interactions

## 2021-03-18 NOTE — SOCIAL WORK
Pt declined to participate in psycho/social assessment regarding which SW will follow-up on 3/19/21

## 2021-03-18 NOTE — PROGRESS NOTES
Patient came out of room around 0600  She requested something to drink  She was given the options in the refrigerator  Patient asked for orange juice  Will continue to observe during 7 minute safety checks

## 2021-03-18 NOTE — PLAN OF CARE
Problem: SELF CARE DEFICIT  Goal: Return ADL status to a safe level of function  Description: INTERVENTIONS:  - Administer medication as ordered  - Assess ADL deficits and provide assistive devices as needed  - Obtain PT/OT consults as needed  - Assist and instruct patient to increase activity and self care as tolerated  Outcome: Not Progressing

## 2021-03-18 NOTE — PROGRESS NOTES
03/18/21 0762   Activity/Group Checklist   Group   (Goal Planning and Communication )   Attendance Attended   Attendance Duration (min) 46-60   Interactions Interacted appropriately   Affect/Mood Appropriate   Goals Achieved Identified feelings; Discussed coping strategies; Able to listen to others; Able to engage in interactions

## 2021-03-18 NOTE — PROGRESS NOTES
03/18/21 0900   Team Meeting   Meeting Type Daily Rounds   Initial Conference Date 03/18/21   Team Members Present   Team Members Present Physician;Nurse;   Physician Team Member Dr Lady Del Cid; Sly COLEMAN; Delphine Faustin PA-C   Nursing Team Member Carla Ibarra RN   Social Work Team Member Tiara Lin   Patient/Family Present   Patient Present No   Patient's Family Present No     Agitated  Delusional  Hallucinating  Bizarre movements  Screams loudly on the unit  Does have moments of lucidity  Was on the phone with her mom this morning  Rambling  Slight improvement today  Paranoid about her medications  On Invega injection

## 2021-03-18 NOTE — PROGRESS NOTES
03/17/21 5290   Activity/Group Checklist   Group   ([facility])   Attendance Did not attend  (group offered, pt did not attend)

## 2021-03-18 NOTE — PROGRESS NOTES
Patient has been laying in bed since the beginning of the shift  Minimal conversing with staff  Answered my questions with " yes or no " Compliant with medications  Denies any S/H ideation  Patient also denies any A/V/T hallucinations  Will continue to observe during 7 minute safety checks

## 2021-03-18 NOTE — PROGRESS NOTES
Progress Note - Behavioral Health     Caleb Morelos 53 y o  female MRN: [id number]   Unit/Bed#: [facility] 249-01 Encounter: [id number]    Behavior over the last 24 hours:      Queenie Wells was seen for an inpatient follow-up psychiatric visit this date  She remains hyperverbal, tangential, and somewhat confused and disorganized  She was able to tell me today that she stopped her medications for 3 days because she became overwhelmed with things going on between family members  She was not  able to elaborate  She states she lives in an apartment and her sister lives upstairs  Her mother lives in the same complex but does not live with her  They all see each other on a regular basis  She has periods of lucidity when she is coherent and logical   Other times, she is confused  Her mood remains labile but is improving with medication  She denies any auditory or visual hallucinations at this time  She still has periods of screaming  She slept last night  Her appetite is good  She wishes to be back on the same medications that are prescribed by Dr Kandice Vickers of the [facility] ACT team      Meds are as follows:  Risperidone 1 mg q a m  Risperidone 2 mg q p m  Banophen 25 mg q h s  Lexapro 10 mg daily  Klonopin 0 5 mg t i d   Gabapentin 600 mg q i d  Invega Trinza 819 mg every 3 months  Her last Invega injection was given on March 2nd, 2021         ROS: no complaints, all other systems are negative    Mental Status Evaluation:    Appearance:  casually dressed, dressed appropriately   Behavior:  cooperative   Speech:  hypertalkative   Mood:  labile   Affect:  reactive   Thought Process:  less disorganized, still very tangential   Associations: intact associations   Thought Content:  some paranoia   Perceptual Disturbances: denies auditory hallucinations when asked, appears responding to internal stimuli, talks to self at times   Risk Potential: Suicidal ideation - None  Homicidal ideation - None  Potential for aggression - No Sensorium:  oriented to person, place and time/date   Memory:  recent and remote memory grossly intact   Consciousness:  alert and awake   Attention: decreased concentration and decreased attention span   Insight:  limited   Judgment: limited   Gait/Station: normal gait/station, normal balance   Motor Activity: no abnormal movements     Vital signs in last 24 hours:    Temp:  [98 4 °F (36 9 °C)] 98 4 °F (36 9 °C)  HR:  [97] 97  Resp:  [18] 18  BP: (138)/(84) 138/84    Laboratory results:  I have personally reviewed all pertinent laboratory/tests results  Progress Toward Goals: progressing    Assessment/Plan   Principal Problem:    Schizoaffective disorder, bipolar type (HCC)  Active Problems:    LYNN (generalized anxiety disorder)    Mild intermittent asthma without complication    Tobacco abuse    Recommended Treatment:     Continue risperidone 2 mg b i d  Ativan 0 5 mg t i d  Changed to Klonopin 0 5 mg t i d  Per med list and patient request   Continue Neurontin 600 mg 4 times daily  Benadryl 25 mg q h s  Added per med list   Continue to monitor  Discharge disposition and planning are ongoing      All current active medications have been reviewed  Encourage group therapy, milieu therapy and occupational therapy  Behavioral Health checks every 7 minutes    Current Facility-Administered Medications   Medication Dose Route Frequency Provider Last Rate    acetaminophen  650 mg Oral Q6H PRN [facility], DO      acetaminophen  650 mg Oral Q4H PRN Phillip Steinons, DO      acetaminophen  975 mg Oral Q6H PRN Phillip Steinons, DO      albuterol  2 puff Inhalation Q6H PRN Phillip Steinons, DO      haloperidol lactate  2 5 mg Intramuscular Q4H PRN Max 6/day Phillip Steele, DO      And    LORazepam  1 mg Intramuscular Q4H PRN Max 6/day Phillip Steinons, DO      And    benztropine  0 5 mg Intramuscular Q4H PRN Max 6/day Phillip Dixons, DO      haloperidol lactate  5 mg Intramuscular Q4H PRN Max 4/day Sissy Sameer Higgins, DO      And    LORazepam  2 mg Intramuscular Q4H PRN Max 4/day Vidales Dais, DO      And    benztropine  1 mg Intramuscular Q4H PRN Max 4/day Vidales Dais, DO      benztropine  1 mg Intramuscular Q4H PRN Max 6/day Vidales Dais, DO      benztropine  1 mg Oral Q4H PRN Max 6/day Vidales Dais, DO      clonazePAM  0 5 mg Oral TID [facility], CRNP      hydrOXYzine HCL  50 mg Oral Q6H PRN Max 4/day Vidales Dais, DO      Or    diphenhydrAMINE  50 mg Intramuscular Q6H PRN Vidales Dais, DO      escitalopram  5 mg Oral Daily Brenda Wilson MD      gabapentin  600 mg Oral 4x Daily Brenda Wilson MD      hydrOXYzine HCL  100 mg Oral Q6H PRN Max 4/day Vidales Dais, DO      Or    LORazepam  2 mg Intramuscular Q6H PRN Vidales Dais, DO      hydrOXYzine HCL  25 mg Oral Q6H PRN Max 4/day Vidales Dais, DO      nicotine  1 patch Transdermal Daily Vidales Dais, DO      risperiDONE  0 5 mg Oral Q4H PRN Max 6/day Vidales Dais, DO      risperiDONE  1 mg Oral Q4H PRN Max 3/day Vidales Dais, DO      risperiDONE  2 mg Oral Q4H PRN Max 3/day Vidales Dais, DO      risperiDONE  2 mg Oral BID Brenda Wilson MD         Risks / Benefits of Treatment:    Risks, benefits, and possible side effects of medications explained to patient and patient verbalizes understanding and agreement for treatment  Counseling / Coordination of Care:      Patient's progress discussed with staff in treatment team meeting  Medications, treatment progress and treatment plan reviewed with patient

## 2021-03-18 NOTE — PROGRESS NOTES
03/18/21 1330   Activity/Group Checklist   Group   ([facility])   Attendance Attended   Attendance Duration (min) 0-15   Interactions Interacted appropriately   Affect/Mood Appropriate;Calm   Goals Achieved Identified feelings; Able to listen to others; Able to engage in interactions

## 2021-03-19 PROCEDURE — 99232 SBSQ HOSP IP/OBS MODERATE 35: CPT | Performed by: NURSE PRACTITIONER

## 2021-03-19 RX ORDER — LIDOCAINE 50 MG/G
1 PATCH TOPICAL DAILY
Status: DISCONTINUED | OUTPATIENT
Start: 2021-03-19 | End: 2021-04-07 | Stop reason: HOSPADM

## 2021-03-19 RX ADMIN — CLONAZEPAM 0.5 MG: 0.5 TABLET ORAL at 21:08

## 2021-03-19 RX ADMIN — ESCITALOPRAM 5 MG: 5 TABLET, FILM COATED ORAL at 08:06

## 2021-03-19 RX ADMIN — GABAPENTIN 600 MG: 300 CAPSULE ORAL at 08:06

## 2021-03-19 RX ADMIN — GABAPENTIN 600 MG: 300 CAPSULE ORAL at 21:08

## 2021-03-19 RX ADMIN — RISPERIDONE 2 MG: 2 TABLET ORAL at 17:47

## 2021-03-19 RX ADMIN — RISPERIDONE 2 MG: 1 TABLET, ORALLY DISINTEGRATING ORAL at 19:28

## 2021-03-19 RX ADMIN — CLONAZEPAM 0.5 MG: 0.5 TABLET ORAL at 16:15

## 2021-03-19 RX ADMIN — CLONAZEPAM 0.5 MG: 0.5 TABLET ORAL at 08:06

## 2021-03-19 RX ADMIN — NICOTINE 1 PATCH: 21 PATCH, EXTENDED RELEASE TRANSDERMAL at 08:05

## 2021-03-19 RX ADMIN — LIDOCAINE 1 PATCH: 50 PATCH CUTANEOUS at 12:32

## 2021-03-19 RX ADMIN — GABAPENTIN 600 MG: 300 CAPSULE ORAL at 17:47

## 2021-03-19 RX ADMIN — DIPHENHYDRAMINE HCL 25 MG: 25 TABLET ORAL at 21:08

## 2021-03-19 RX ADMIN — RISPERIDONE 2 MG: 2 TABLET ORAL at 08:06

## 2021-03-19 RX ADMIN — GABAPENTIN 600 MG: 300 CAPSULE ORAL at 12:32

## 2021-03-19 NOTE — PROGRESS NOTES
Patient noted to be extremely agitated  Unable to deescalate not agreeable to oral medication 38 on agitated behavior scale  IM of 5mg haldol and 1 mg of cogentin given in R deltoid   Will continue to monitor

## 2021-03-19 NOTE — PROGRESS NOTES
03/19/21 0726   Activity/Group Checklist   Group   (Goal Planning and Communication )   Attendance Attended   Attendance Duration (min) 46-60   Interactions Interacted appropriately   Affect/Mood Appropriate;Calm   Goals Achieved Identified feelings; Discussed coping strategies; Able to listen to others; Able to engage in interactions

## 2021-03-19 NOTE — PROGRESS NOTES
03/19/21 0747   Team Meeting   Meeting Type Daily Rounds   Initial Conference Date 03/19/21   Team Members Present   Team Members Present Physician;Nurse;   Physician Team Member Dr Yovani Claros; Letty Pool   Nursing Team Member Jennifer Holbrook, YUDY   Social Work Team Member Tamanna Morales [place]   Patient/Family Present   Patient Present No   Patient's Family Present No     Still nonsensical, but improving  Not screaming as much as previously

## 2021-03-19 NOTE — PROGRESS NOTES
Patient has been sleeping throughout 7 minute safety checks  No restless behaviors observed  No signs or symptoms of pain, discomfort or respiratory distress  Will continue to monitor during 7 minute safety checks

## 2021-03-19 NOTE — PROGRESS NOTES
03/19/21 1000   Activity/Group Checklist   Group   ([facility])   Attendance Attended   Attendance Duration (min) 16-30   Interactions Interacted appropriately   Affect/Mood Appropriate;Bright;Calm   Goals Achieved Identified feelings; Discussed coping strategies; Able to listen to others; Able to engage in interactions

## 2021-03-19 NOTE — PROGRESS NOTES
Progress Note - Behavioral Health     Terrell Rogel 53 y o  female MRN: [id number]   Unit/Bed#: [facility] 249-01 Encounter: [id number]    Behavior over the last 24 hours:      Brandon Burr was seen for an inpatient follow-up psychiatric visit this date  She is much less labile and is no longer delusional or psychotic  She relates she still hears voices at times which is baseline for her  Her verbal outbursts are decreasing  She is taking her medications as prescribed and does not want anything changed  Her appetite and sleep are good  She showered today  She is pleasant cooperative with staff  Overall, she has improved significantly and is looking forward to being discharged early next week if she continues to progress  ROS: no complaints, all other systems are negative    Mental Status Evaluation:    Appearance:  casually dressed, dressed appropriately, adequate grooming   Behavior:  pleasant, more calm, good eye contact   Speech:  normal rate and volume   Mood:  improved, less anxious, much less labile   Affect:  normal range and intensity   Thought Process:  goal directed, linear   Associations: intact associations   Thought Content:  no overt delusions   Perceptual Disturbances: none   Risk Potential: Suicidal ideation - None  Homicidal ideation - None  Potential for aggression - No   Sensorium:  oriented to person, place and time/date   Memory:  recent and remote memory grossly intact   Consciousness:  alert and awake   Attention: attention span and concentration appear shorter than expected for age   Insight:  improving   Judgment: improving   Gait/Station: normal gait/station, normal balance   Motor Activity: no abnormal movements     Vital signs in last 24 hours:    Temp:  [98 1 °F (36 7 °C)] 98 1 °F (36 7 °C)  HR:  [59-74] 74  Resp:  [16-18] 18  BP: ()/(54-60) 100/60    Laboratory results:  I have personally reviewed all pertinent laboratory/tests results      Progress Toward Goals: progressing    Assessment/Plan   Principal Problem:    Schizoaffective disorder, bipolar type (HCC)  Active Problems:    LYNN (generalized anxiety disorder)    Mild intermittent asthma without complication    Tobacco abuse    Recommended Treatment:     Continue current medications as prescribed  Continue to monitor  Discharge disposition and planning are ongoing      All current active medications have been reviewed  Encourage group therapy, milieu therapy and occupational therapy  Behavioral Health checks every 7 minutes    Current Facility-Administered Medications   Medication Dose Route Frequency Provider Last Rate    acetaminophen  650 mg Oral Q6H PRN [facility] [place], DO      acetaminophen  650 mg Oral Q4H PRN [facility] [place], DO      acetaminophen  975 mg Oral [facility]N [facility]      albuterol  2 puff Inhalation Q6H PRN [facility], [facility]      haloperidol lactate  2 5 mg Intramuscular Q4H PRN Max 6/day [facility], DO      And    LORazepam  1 mg Intramuscular Q4H PRN Max 6/day [facility], DO      And    benztropine  0 5 mg Intramuscular Q4H PRN Max 6/day [facility], DO      haloperidol lactate  5 mg Intramuscular Q4H PRN Max 4/day [facility], DO      And    LORazepam  2 mg Intramuscular Q4H PRN Max 4/day [facility], DO      And    benztropine  1 mg Intramuscular Q4H PRN Max 4/day [facility], DO      benztropine  1 mg Intramuscular Q4H PRN Max 6/day [facility], DO      benztropine  1 mg Oral Q4H PRN Max 6/day [facility], DO      clonazePAM  0 5 mg Oral TID JARED Bear      hydrOXYzine HCL  50 mg Oral Q6H PRN Max 4/day [facility] [place], DO      Or    diphenhydrAMINE  50 mg Intramuscular Q6H PRN [facility], DO      diphenhydrAMINE  25 mg Oral HS JARED Jones      escitalopram  5 mg Oral Daily Ashlyn Mahajan MD      gabapentin  600 mg Oral 4x Daily Ashlyn Mahajan MD      hydrOXYzine HCL  100 mg Oral Q6H PRN Max 4/day Sweetie Little DO      Or    LORazepam  2 mg Intramuscular Q6H PRN Sweetie Little DO      hydrOXYzine HCL  25 mg Oral Q6H PRN Max 4/day Sweetie Little DO      lidocaine  1 patch Topical Daily Darlyn Henry MD      nicotine  1 patch Transdermal Daily Sweetie Little [place]      risperiDONE  0 5 mg Oral Q4H PRN Max 6/day Sweetie Little, DO      risperiDONE  1 mg Oral Q4H PRN Max 3/day Sweetie Little,       risperiDONE  2 mg Oral Q4H PRN Max 3/day Sweetie Little,       risperiDONE  2 mg Oral BID Harold Bence, MD         Risks / Benefits of Treatment:    Risks, benefits, and possible side effects of medications explained to patient and patient verbalizes understanding and agreement for treatment  Counseling / Coordination of Care:      Patient's progress discussed with staff in treatment team meeting  Medications, treatment progress and treatment plan reviewed with patient

## 2021-03-19 NOTE — PROGRESS NOTES
Patient remains visible on unit  Pleasant and cooperative  Denies HI/SI   Compliant with medications and meals  Q 7 min checks continue  Will continue to monitor

## 2021-03-19 NOTE — PROGRESS NOTES
Patient visible on the unit  She showered  Patient was pleasant and cooperative with morning assessment  C/o back pain this morning but refused PRN tylenol when offered  Patient requested lidoderm patch be ordered  Medical did see patient and ordered lidoderm patch and administered  She denied anxiety or depression with morning medication administration  Denied SI,HI, or hallucinations  She has had fewer outbursts today than the previous day  She does go in her room and yell and  is more directable  Medication compliant  Q 7 minute safety checks maintained

## 2021-03-19 NOTE — QUICK NOTE
Patient had the following belongings brought in for her  Remains with patient   Underwear x 5  Socks x4   Pink bra   Top tank x3  Black pants   Purple pants   Planner book         Patient storage closet   Pants x4 with strings   Purple sweatshirt   2 graphic fely's   luis shirt   Notebooks x4      Patient was present and signed belonging sheet

## 2021-03-19 NOTE — PLAN OF CARE
Problem: Nutrition/Hydration-ADULT  Goal: Nutrient/Hydration intake appropriate for improving, restoring or maintaining nutritional needs  Description: Monitor and assess patient's nutrition/hydration status for malnutrition  Collaborate with interdisciplinary team and initiate plan and interventions as ordered  Monitor patient's weight and dietary intake as ordered or per policy  Utilize nutrition screening tool and intervene as necessary  Determine patient's food preferences and provide high-protein, high-caloric foods as appropriate       INTERVENTIONS:  - Monitor oral intake, urinary output, labs, and treatment plans  - Assess nutrition and hydration status and recommend course of action  - Evaluate amount of meals eaten  - Assist patient with eating if necessary   - Allow adequate time for meals  - Recommend/ encourage appropriate diets, oral nutritional supplements, and vitamin/mineral supplements  - Order, calculate, and assess calorie counts as needed  - Recommend, monitor, and adjust tube feedings and TPN/PPN based on assessed needs  - Assess need for intravenous fluids  - Provide specific nutrition/hydration education as appropriate  - Include patient/family/caregiver in decisions related to nutrition  Outcome: Progressing     Problem: SELF CARE DEFICIT  Goal: Return ADL status to a safe level of function  Description: INTERVENTIONS:  - Administer medication as ordered  - Assess ADL deficits and provide assistive devices as needed  - Obtain PT/OT consults as needed  - Assist and instruct patient to increase activity and self care as tolerated  Outcome: Progressing

## 2021-03-20 PROCEDURE — 99232 SBSQ HOSP IP/OBS MODERATE 35: CPT | Performed by: NURSE PRACTITIONER

## 2021-03-20 RX ORDER — RISPERIDONE 1 MG/1
1 TABLET, FILM COATED ORAL DAILY
Status: DISCONTINUED | OUTPATIENT
Start: 2021-03-21 | End: 2021-03-22

## 2021-03-20 RX ORDER — CLONAZEPAM 0.5 MG/1
0.5 TABLET ORAL 3 TIMES DAILY
Status: DISCONTINUED | OUTPATIENT
Start: 2021-03-20 | End: 2021-04-07 | Stop reason: HOSPADM

## 2021-03-20 RX ORDER — RISPERIDONE 2 MG/1
2 TABLET, FILM COATED ORAL
Status: DISCONTINUED | OUTPATIENT
Start: 2021-03-20 | End: 2021-03-22

## 2021-03-20 RX ADMIN — RISPERIDONE 0.5 MG: 0.5 TABLET, ORALLY DISINTEGRATING ORAL at 18:44

## 2021-03-20 RX ADMIN — GABAPENTIN 600 MG: 300 CAPSULE ORAL at 21:49

## 2021-03-20 RX ADMIN — CLONAZEPAM 0.5 MG: 0.5 TABLET ORAL at 08:26

## 2021-03-20 RX ADMIN — GABAPENTIN 600 MG: 300 CAPSULE ORAL at 08:27

## 2021-03-20 RX ADMIN — LIDOCAINE 1 PATCH: 50 PATCH CUTANEOUS at 11:01

## 2021-03-20 RX ADMIN — RISPERIDONE 2 MG: 2 TABLET ORAL at 21:48

## 2021-03-20 RX ADMIN — GABAPENTIN 600 MG: 300 CAPSULE ORAL at 12:38

## 2021-03-20 RX ADMIN — CLONAZEPAM 0.5 MG: 0.5 TABLET ORAL at 21:48

## 2021-03-20 RX ADMIN — NICOTINE 1 PATCH: 21 PATCH, EXTENDED RELEASE TRANSDERMAL at 11:05

## 2021-03-20 RX ADMIN — ESCITALOPRAM 5 MG: 5 TABLET, FILM COATED ORAL at 08:26

## 2021-03-20 RX ADMIN — DIPHENHYDRAMINE HCL 25 MG: 25 TABLET ORAL at 21:47

## 2021-03-20 RX ADMIN — GABAPENTIN 600 MG: 300 CAPSULE ORAL at 17:24

## 2021-03-20 RX ADMIN — RISPERIDONE 2 MG: 2 TABLET ORAL at 08:27

## 2021-03-20 NOTE — PROGRESS NOTES
Patient note in room   Appears irritated at time , but pleasant  Complaint with  medication  Denies anxiety,depression,hallucination,SI,HI  Continue safety checks

## 2021-03-20 NOTE — PROGRESS NOTES
Patient slept the majority of the night through  Non labored breathing  No signs or symptoms of distress  Continual monitoring maintained for safety

## 2021-03-20 NOTE — PROGRESS NOTES
Pt verbalized PRN medication was effective and decreased aggegation  Controled at present time  Good eye contact  Pt denies SI and HI however over heard pt talking to self stating " I don't know what you want from me all the time, your like a stalker or something " Continual monitoring maintained for safety

## 2021-03-20 NOTE — PLAN OF CARE
Problem: Alteration in Thoughts and Perception  Goal: Verbalize thoughts and feelings  Description: Interventions:  - Promote a nonjudgmental and trusting relationship with the patient through active listening and therapeutic communication  - Assess patient's level of functioning, behavior and potential for risk  - Engage patient in 1 on 1 interactions  - Encourage patient to express fears, feelings, frustrations, and discuss symptoms    - [place] patient to reality, help patient recognize reality-based thinking   - Administer medications as ordered and assess for potential side effects  - Provide the patient education related to the signs and symptoms of the illness and desired effects of prescribed medications  Interventions:  - Assess and re-assess patient's level of risk   - Engage patient in 1:1 interactions, daily, for a minimum of 15 minutes   - Encourage patient to express feelings, fears, frustrations, hopes   Outcome: Progressing  Goal: Attend and participate in unit activities, including therapeutic, recreational, and educational groups  Description: Interventions:  -Encourage Visitation and family involvement in care  Interventions:  - Provide therapeutic and educational activities daily, encourage attendance and participation, and document same in the medical record   Outcome: Progressing  Goal: Complete daily ADLs, including personal hygiene independently, as able  Description: Interventions:  - Observe, teach, and assist patient with ADLS  - Monitor and promote a balance of rest/activity, with adequate nutrition and elimination   Interventions:  - Observe, teach, and assist patient with ADLS  -  Monitor and promote a balance of rest/activity, with adequate nutrition and elimination   Outcome: Progressing     Problem: Depression  Goal: Verbalize thoughts and feelings  Description: Interventions:  - Promote a nonjudgmental and trusting relationship with the patient through active listening and therapeutic communication  - Assess patient's level of functioning, behavior and potential for risk  - Engage patient in 1 on 1 interactions  - Encourage patient to express fears, feelings, frustrations, and discuss symptoms    - [place] patient to reality, help patient recognize reality-based thinking   - Administer medications as ordered and assess for potential side effects  - Provide the patient education related to the signs and symptoms of the illness and desired effects of prescribed medications  Interventions:  - Assess and re-assess patient's level of risk   - Engage patient in 1:1 interactions, daily, for a minimum of 15 minutes   - Encourage patient to express feelings, fears, frustrations, hopes   Outcome: Progressing  Goal: Attend and participate in unit activities, including therapeutic, recreational, and educational groups  Description: Interventions:  -Encourage Visitation and family involvement in care  Interventions:  - Provide therapeutic and educational activities daily, encourage attendance and participation, and document same in the medical record   Outcome: Progressing  Goal: Complete daily ADLs, including personal hygiene independently, as able  Description: Interventions:  - Observe, teach, and assist patient with ADLS  - Monitor and promote a balance of rest/activity, with adequate nutrition and elimination   Interventions:  - Observe, teach, and assist patient with ADLS  -  Monitor and promote a balance of rest/activity, with adequate nutrition and elimination   Outcome: Progressing  Goal: Treatment Goal: Demonstrate behavioral control of depressive symptoms, verbalize feelings of improved mood/affect, and adopt new coping skills prior to discharge  Description: Interventions:  - Promote a nonjudgmental and trusting relationship with the patient through active listening and therapeutic communication  - Assess patient's level of functioning, behavior and potential for risk  - Engage patient in 1 on 1 interactions  - Encourage patient to express fears, feelings, frustrations, and discuss symptoms    - [place] patient to reality, help patient recognize reality-based thinking   - Administer medications as ordered and assess for potential side effects  - Provide the patient education related to the signs and symptoms of the illness and desired effects of prescribed medications  Interventions:  - Assess and re-assess patient's level of risk   - Engage patient in 1:1 interactions, daily, for a minimum of 15 minutes   - Encourage patient to express feelings, fears, frustrations, hopes   Outcome: Progressing  Goal: Refrain from harming self  Description: Interventions:  -Encourage Visitation and family involvement in care  Interventions:  - Provide therapeutic and educational activities daily, encourage attendance and participation, and document same in the medical record   Outcome: Progressing  Goal: Refrain from isolation  Description: Interventions:  - Observe, teach, and assist patient with ADLS  - Monitor and promote a balance of rest/activity, with adequate nutrition and elimination   Interventions:  - Observe, teach, and assist patient with ADLS  -  Monitor and promote a balance of rest/activity, with adequate nutrition and elimination   Outcome: Progressing  Goal: Refrain from self-neglect  Outcome: Progressing     Problem: Anxiety  Goal: Anxiety is at manageable level  Description: Interventions:  - Assess and monitor patient's anxiety level  - Monitor for signs and symptoms (heart palpitations, chest pain, shortness of breath, headaches, nausea, feeling jumpy, restlessness, irritable, apprehensive)  - Collaborate with interdisciplinary team and initiate plan and interventions as ordered    - [place] patient to unit/surroundings  - Explain treatment plan  - Encourage participation in care  - Encourage verbalization of concerns/fears  - Identify coping mechanisms  - Assist in developing anxiety-reducing skills  - Administer/offer alternative therapies  - Limit or eliminate stimulants  Outcome: Progressing

## 2021-03-20 NOTE — PROGRESS NOTES
Progress Note - Behavioral Health   Juan Miguel Salgado 53 y o  female MRN: [id number]  Unit/Bed#: [facility] 249-01 Encounter: [id number]    Assessment/Plan   Principal Problem:    Schizoaffective disorder, bipolar type ([address] )  Active Problems:    LYNN (generalized anxiety disorder)    Mild intermittent asthma without complication    Tobacco abuse  Patient was seen for continuing care and treatment  Case was discussed with the nursing staff  On examination today, the patient's mood is improved  She is reporting less auditory hallucinations and she is more controlled her behaviors  She is still delusional and she does ramble on at times  She is however, not happy with the way she is being given her Risperdal   She is requesting that she go back to her doses of 1 mg in the morning and 2 mg at HS  I told her we would try this but if her symptoms worsen, we would need to increase it  Also she is asking for her Klonopin to be given at certain times of the day and I will honor that for her  Overall, she is more controlled than when she came in and she is still disheveled but is tending more to her personal needs than she did previously  Her sleep is improved  Continue to monitor on inpatient basis and again, if patient's symptoms increase with this most recent request from her, she knows that we need to increase the medicine back up to 4 mg of Risperdal per day      Behavior over the last 24 hours:  Improving slowly  Sleep: normal  Appetite:  Fair  Medication side effects: Yes right now, she is complaining of increased sedation  ROS: no complaints    Mental Status Evaluation:  Appearance:  disheveled   Behavior:  Cooperative   Speech:  tends to ramble   Mood:  labile   Affect:  mood-congruent   Thought Process:  disorganized   Thought Content:  obsessions   Perceptual Disturbances: Denied   Risk Potential: Suicidal Ideations none  Homicidal Ideations none  Potential for Aggression No   Sensorium:  person, place and time/date Memory:  recent and remote memory grossly intact   Consciousness:  alert and awake    Attention: attention span appeared shorter than expected for age   Insight:  limited   Judgment: limited   Gait/Station: normal gait/station   Motor Activity: no abnormal movements     Progress Toward Goals:  Slowly improving mood    Recommended Treatment: Continue with group therapy, milieu therapy and occupational therapy  Risks, benefits and possible side effects of Medications:   Risks, benefits, and possible side effects of medications explained to patient and patient verbalizes understanding  Medications: continue current psychiatric medications  Labs: I have personally reviewed all pertinent laboratory/tests results  Most Recent Labs:   Lab Results   Component Value Date    WBC 9 70 03/16/2021    RBC 4 91 03/16/2021    HGB 14 9 03/16/2021    HCT 45 1 03/16/2021     03/16/2021    RDW 14 1 03/16/2021    NEUTROABS 7 10 (H) 03/16/2021    SODIUM 134 03/17/2021    K 4 0 03/17/2021    CL 99 03/17/2021    CO2 29 03/17/2021    BUN 14 03/17/2021    CREATININE 0 56 (L) 03/17/2021    GLUC 112 (H) 03/17/2021    GLUF 112 (H) 03/17/2021    CALCIUM 9 4 03/17/2021    AST 27 03/17/2021    ALT 18 03/17/2021    ALKPHOS 67 03/17/2021    TP 7 0 03/17/2021    ALB 4 2 03/17/2021    TBILI 0 40 03/17/2021    CHOLESTEROL 220 (H) 08/23/2020    HDL 73 08/23/2020    TRIG 218 (H) 08/23/2020    LDLCALC 103 (H) 08/23/2020    [facility] 147 08/23/2020    AMMONIA 28 10/27/2017    HKG1YCKFWCUI 2 190 10/24/2019    FREET4 0 89 03/24/2016    PREGSERUM Negative 10/21/2019    HCG <2 00 04/10/2014    RPR Non-Reactive 10/25/2018    HGBA1C 5 0 08/06/2019    EAG 97 08/06/2019       Counseling / Coordination of Care  Total floor / unit time spent today 25 minutes  Greater than 50% of total time was spent with the patient and / or family counseling and / or coordination of care   A description of the counseling / coordination of care:  Chart review and medication management and treatment

## 2021-03-20 NOTE — PROCEDURES
Patient given 2 mg risperidone secondary to agitation to get her through until her night time medication is due   Will continue to monitor

## 2021-03-21 PROCEDURE — 99232 SBSQ HOSP IP/OBS MODERATE 35: CPT | Performed by: PSYCHIATRY & NEUROLOGY

## 2021-03-21 RX ADMIN — ESCITALOPRAM 5 MG: 5 TABLET, FILM COATED ORAL at 07:57

## 2021-03-21 RX ADMIN — LIDOCAINE 1 PATCH: 50 PATCH CUTANEOUS at 08:03

## 2021-03-21 RX ADMIN — DIPHENHYDRAMINE HCL 25 MG: 25 TABLET ORAL at 21:32

## 2021-03-21 RX ADMIN — RISPERIDONE 0.5 MG: 0.5 TABLET, ORALLY DISINTEGRATING ORAL at 15:37

## 2021-03-21 RX ADMIN — GABAPENTIN 600 MG: 300 CAPSULE ORAL at 16:51

## 2021-03-21 RX ADMIN — GABAPENTIN 600 MG: 300 CAPSULE ORAL at 07:54

## 2021-03-21 RX ADMIN — CLONAZEPAM 0.5 MG: 0.5 TABLET ORAL at 21:33

## 2021-03-21 RX ADMIN — RISPERIDONE 1 MG: 1 TABLET ORAL at 08:00

## 2021-03-21 RX ADMIN — NICOTINE 1 PATCH: 21 PATCH, EXTENDED RELEASE TRANSDERMAL at 08:03

## 2021-03-21 RX ADMIN — RISPERIDONE 2 MG: 2 TABLET ORAL at 21:33

## 2021-03-21 RX ADMIN — CLONAZEPAM 0.5 MG: 0.5 TABLET ORAL at 07:59

## 2021-03-21 RX ADMIN — GABAPENTIN 600 MG: 300 CAPSULE ORAL at 11:35

## 2021-03-21 RX ADMIN — CLONAZEPAM 0.5 MG: 0.5 TABLET ORAL at 11:36

## 2021-03-21 RX ADMIN — GABAPENTIN 600 MG: 300 CAPSULE ORAL at 21:32

## 2021-03-21 NOTE — PROGRESS NOTES
Patient  remain paranoid , disorganized  [facility]  requested to lay down in room 243 (unlocked) to feel safe  Continue on safety checks

## 2021-03-21 NOTE — PROGRESS NOTES
Patient note pacing milieu  Preoccupied with voices  Moment of outburst, easily  Redirected  Complaint with medication  Currently resting in room quietly  Time of Visit:  Patient seen and examined.     MEDICATIONS  (STANDING):  aspirin  chewable 81 milliGRAM(s) Oral daily  atorvastatin 40 milliGRAM(s) Oral at bedtime  chlorhexidine 0.12% Liquid 15 milliLiter(s) Oral Mucosa every 12 hours  chlorhexidine 4% Liquid 1 Application(s) Topical daily  chlorhexidine 4% Liquid 1 Application(s) Topical <User Schedule>  dexMEDEtomidine Infusion 0.7 MICROgram(s)/kG/Hr (21.875 mL/Hr) IV Continuous <Continuous>  folic acid 1 milliGRAM(s) Oral daily  heparin  Injectable 5000 Unit(s) SubCutaneous every 12 hours  insulin lispro (HumaLOG) corrective regimen sliding scale   SubCutaneous every 6 hours  lactulose Syrup 10 Gram(s) Oral two times a day  multivitamin 1 Tablet(s) Oral daily  norepinephrine Infusion 0.13 MICROgram(s)/kG/Min (15.234 mL/Hr) IV Continuous <Continuous>  nystatin Powder 1 Application(s) Topical three times a day  pantoprazole   Suspension 40 milliGRAM(s) Enteral Tube daily  piperacillin/tazobactam IVPB.. 3.375 Gram(s) IV Intermittent every 8 hours  thiamine IVPB 500 milliGRAM(s) IV Intermittent daily  vancomycin  IVPB 1000 milliGRAM(s) IV Intermittent <User Schedule>  vancomycin  IVPB 1000 milliGRAM(s) IV Intermittent once      MEDICATIONS  (PRN):  PHENobarbital Injectable 130 milliGRAM(s) IV Push every 6 hours PRN agitation  sodium chloride 0.9% lock flush 10 milliLiter(s) IV Push every 1 hour PRN Pre/post blood products, medications, blood draw, and to maintain line patency       Medications up to date at time of exam.      PHYSICAL EXAMINATION:  Vital Signs Last 24 Hrs  T(C): 38.6 (10 Luiz 2020 14:30), Max: 38.9 (10 Luiz 2020 02:45)  T(F): 101.4 (10 Luiz 2020 14:30), Max: 102.1 (10 Luiz 2020 02:45)  HR: 103 (10 Luiz 2020 15:00) (85 - 136)  BP: 119/90 (10 Luiz 2020 13:00) (56/40 - 191/129)  BP(mean): 93 (10 Luiz 2020 13:00) (44 - 145)  RR: 15 (10 Luiz 2020 15:00) (12 - 27)  SpO2: 100% (10 Luiz 2020 15:00) (78% - 100%)  Mode: AC/ CMV (Assist Control/ Continuous Mandatory Ventilation)  RR (machine): 14  TV (machine): 500  FiO2: 40  PEEP: 5  ITime: 1  MAP: 8.6  PIP: 29   (if applicable)    General; Sedated , on vent support. No acute distress.     HEENT: Head is normocephalic and atraumatic. No nasal tenderness. + ETT , + NGT.  Mucous membranes are moist.     NECK: Supple, no palpable adenopathy.    LUNGS: Clear to auscultation, no wheezing, rales, or rhonchi. No use of accessory muscle. On vent support to AC setting.      HEART: S1 S2 Regular rate and No JVD.    ABDOMEN: Soft, nontender, and nondistended.  Active bowel sounds .    EXTREMITIES: Total care. Non ambulatory.     NEUROLOGIC: Sedated. No tremors. No seizure.      SKIN: Warm and moist. Non diaphoretic.       LABS:                        15.0   4.90  )-----------( 40       ( 10 Luiz 2020 09:27 )             47.1     01-10    145  |  107  |  65<H>  ----------------------------<  173<H>  4.4   |  28  |  1.30    Ca    10.6<H>      10 Luiz 2020 03:26  Phos  3.4     01-10  Mg     2.4     01-10      PT/INR - ( 10 Luiz 2020 09:27 )   PT: 14.3 sec;   INR: 1.28 ratio         PTT - ( 10 Luiz 2020 09:27 )  PTT:47.4 sec  Urinalysis Basic - ( 2020 12:15 )    Color: Marleny / Appearance: very cloudy / S.025 / pH: x  Gluc: x / Ketone: Trace  / Bili: Moderate / Urobili: 4   Blood: x / Protein: 100 / Nitrite: Positive   Leuk Esterase: Trace / RBC: See Note /HPF / WBC 0-2 /HPF   Sq Epi: x / Non Sq Epi: Occasional /HPF / Bacteria: Many /HPF      ABG - ( 2020 20:10 )  pH, Arterial: 7.36  pH, Blood: x     /  pCO2: 52    /  pO2: 53    / HCO3: 28    / Base Excess: 2.3   /  SaO2: 84                    D-Dimer Assay, Quantitative: 1978 ng/mL DDU (01-10-20 @ 09:27)            MICROBIOLOGY: (if applicable)    RADIOLOGY & ADDITIONAL STUDIES:  EKG:   CXR: < from: Xray Chest 1 View-PORTABLE IMMEDIATE (01.10.20 @ 10:09) >  INTERPRETATION:  Chest one view    HISTORY: Right upper lobe pneumonia    COMPARISON STUDY: 2020    Frontal expiratory view of the chest shows theheart to be similar in size. Endotracheal tube, feeding tube and right subclavian line remain present. The lungs show partial clearing of the right upper lobe and there is no evidence of pneumothorax nor pleural effusion.    IMPRESSION:  Partial clearing, right upper lobe.      <  IMPRESSION: 68y Male PAST MEDICAL & SURGICAL HISTORY:  HTN (hypertension)  Atrial fibrillation  No significant past surgical history      Impression: 69 Y/O Male who appears older than stated age initially admitted to medical floor for CHF exacerbation, afib with RVR, alcohol withdrawal and sepsis 2/2 cellulitis. Patient is AAOx2-3 at baseline, noted lethargic by nurse . Patient is on CIWA protocol and received Ativan Iv push . RRT was called for respiratory distress and lethargy, patient is minimally arousable to sternal rub, not able to follow commands.  + Aspiration Pneumonia . On ICU and  intubated on  due to Acute Hypercapnic Respiratory failure.     Suggestion;  Continue vent support to setting AC 14  FIO2 40% Peep 5.   Oral hygiene care with Chlorhexidine.   Turning and repositioning.   On Levophed and Precedex IVCD drip.   Continue antibiotic as per ID recommendation.   Aspiration precautions with HOB elevation.  DVT/ GI prophylactic.  CIWA protocol. Has bilateral wrist restraint . Time of Visit:  Patient seen and examined.     MEDICATIONS  (STANDING):  aspirin  chewable 81 milliGRAM(s) Oral daily  atorvastatin 40 milliGRAM(s) Oral at bedtime  chlorhexidine 0.12% Liquid 15 milliLiter(s) Oral Mucosa every 12 hours  chlorhexidine 4% Liquid 1 Application(s) Topical daily  chlorhexidine 4% Liquid 1 Application(s) Topical <User Schedule>  dexMEDEtomidine Infusion 0.7 MICROgram(s)/kG/Hr (21.875 mL/Hr) IV Continuous <Continuous>  folic acid 1 milliGRAM(s) Oral daily  heparin  Injectable 5000 Unit(s) SubCutaneous every 12 hours  insulin lispro (HumaLOG) corrective regimen sliding scale   SubCutaneous every 6 hours  lactulose Syrup 10 Gram(s) Oral two times a day  multivitamin 1 Tablet(s) Oral daily  norepinephrine Infusion 0.13 MICROgram(s)/kG/Min (15.234 mL/Hr) IV Continuous <Continuous>  nystatin Powder 1 Application(s) Topical three times a day  pantoprazole   Suspension 40 milliGRAM(s) Enteral Tube daily  piperacillin/tazobactam IVPB.. 3.375 Gram(s) IV Intermittent every 8 hours  thiamine IVPB 500 milliGRAM(s) IV Intermittent daily  vancomycin  IVPB 1000 milliGRAM(s) IV Intermittent <User Schedule>  vancomycin  IVPB 1000 milliGRAM(s) IV Intermittent once      MEDICATIONS  (PRN):  PHENobarbital Injectable 130 milliGRAM(s) IV Push every 6 hours PRN agitation  sodium chloride 0.9% lock flush 10 milliLiter(s) IV Push every 1 hour PRN Pre/post blood products, medications, blood draw, and to maintain line patency       Medications up to date at time of exam.      PHYSICAL EXAMINATION:  Vital Signs Last 24 Hrs  T(C): 38.6 (10 Luiz 2020 14:30), Max: 38.9 (10 Luiz 2020 02:45)  T(F): 101.4 (10 Luiz 2020 14:30), Max: 102.1 (10 Luiz 2020 02:45)  HR: 103 (10 Luiz 2020 15:00) (85 - 136)  BP: 119/90 (10 Luiz 2020 13:00) (56/40 - 191/129)  BP(mean): 93 (10 Luiz 2020 13:00) (44 - 145)  RR: 15 (10 Luiz 2020 15:00) (12 - 27)  SpO2: 100% (10 Luiz 2020 15:00) (78% - 100%)  Mode: AC/ CMV (Assist Control/ Continuous Mandatory Ventilation)  RR (machine): 14  TV (machine): 500  FiO2: 40  PEEP: 5  ITime: 1  MAP: 8.6  PIP: 29   (if applicable)    General; Sedated , on vent support. No acute distress.     HEENT: Head is normocephalic and atraumatic. No nasal tenderness. + ETT , + NGT.  Mucous membranes are moist.     NECK: Supple, no palpable adenopathy.    LUNGS: Clear to auscultation, no wheezing, rales, or rhonchi. No use of accessory muscle. On vent support to AC setting.      HEART: S1 S2 Regular rate and No JVD.    ABDOMEN: Soft, nontender, and nondistended.  Active bowel sounds .    EXTREMITIES: Total care. Non ambulatory.     NEUROLOGIC: Sedated. No tremors. No seizure.      SKIN: Warm and moist. Non diaphoretic.       LABS:                        15.0   4.90  )-----------( 40       ( 10 Luiz 2020 09:27 )             47.1     01-10    145  |  107  |  65<H>  ----------------------------<  173<H>  4.4   |  28  |  1.30    Ca    10.6<H>      10 Luiz 2020 03:26  Phos  3.4     01-10  Mg     2.4     01-10      PT/INR - ( 10 Luiz 2020 09:27 )   PT: 14.3 sec;   INR: 1.28 ratio         PTT - ( 10 Luiz 2020 09:27 )  PTT:47.4 sec  Urinalysis Basic - ( 2020 12:15 )    Color: Marleny / Appearance: very cloudy / S.025 / pH: x  Gluc: x / Ketone: Trace  / Bili: Moderate / Urobili: 4   Blood: x / Protein: 100 / Nitrite: Positive   Leuk Esterase: Trace / RBC: See Note /HPF / WBC 0-2 /HPF   Sq Epi: x / Non Sq Epi: Occasional /HPF / Bacteria: Many /HPF      ABG - ( 2020 20:10 )  pH, Arterial: 7.36  pH, Blood: x     /  pCO2: 52    /  pO2: 53    / HCO3: 28    / Base Excess: 2.3   /  SaO2: 84                    D-Dimer Assay, Quantitative: 1978 ng/mL DDU (01-10-20 @ 09:27)            MICROBIOLOGY: (if applicable)    RADIOLOGY & ADDITIONAL STUDIES:  EKG:   CXR: < from: Xray Chest 1 View-PORTABLE IMMEDIATE (01.10.20 @ 10:09) >  INTERPRETATION:  Chest one view    HISTORY: Right upper lobe pneumonia    COMPARISON STUDY: 2020    Frontal expiratory view of the chest shows theheart to be similar in size. Endotracheal tube, feeding tube and right subclavian line remain present. The lungs show partial clearing of the right upper lobe and there is no evidence of pneumothorax nor pleural effusion.    IMPRESSION:  Partial clearing, right upper lobe.      <  IMPRESSION: 68y Male PAST MEDICAL & SURGICAL HISTORY:  HTN (hypertension)  Atrial fibrillation  No significant past surgical history      Impression: 67 Y/O Male who appears older than stated age initially admitted to medical floor for CHF exacerbation, afib with RVR, alcohol withdrawal and sepsis 2/2 cellulitis. Patient is AAOx2-3 at baseline, noted lethargic by nurse . Patient is on CIWA protocol and received Ativan Iv push . RRT was called for respiratory distress and lethargy, patient is minimally arousable to sternal rub, not able to follow commands.  + Aspiration Pneumonia . On ICU and  intubated on  due to Acute Hypercapnic Respiratory failure.     Suggestion;  Continue vent support to setting AC 14  FIO2 40% Peep 5.   Oral hygiene care with Chlorhexidine.   Turning and repositioning.   On Levophed and Precedex IVCD drip.   Continue antibiotic as per ID recommendation.   Aspiration precautions with HOB elevation.  DVT/ GI prophylactic.  CIWA protocol. Has bilateral wrist restraint .       Agree with above assessment and plan as transcribed.

## 2021-03-21 NOTE — PROGRESS NOTES
At roughly 1630 pt observed pacing hallways yelling and looking over shoulder  Persecutory thoughts  Active auditory and tactile hallucinations  Pt states, "thatn they always do this and these things aren't true" paranoid and suspicious  Pt agreeable to speak with this RN  Allowed patient to vent anger and express frustrations  After speaking at length patient able to process and was receptive discussion and teaching  Tearful at times  Rapid mood shifts  Pt actively engaged in mental health treatment and utilizing positive coping skills  Pt able to identify journaling as most helpful to help me "keep focused when im stable" Compliant with medications as prescribed  Safety precautions maintained  Will continue to monitor and assess

## 2021-03-21 NOTE — PROGRESS NOTES
C/O" I think little better "    Report from staff regarding this patient received and record reviewed  prior to seeing this patient   Behavior over the last 24 hours:  Patient seen for schizoaffective disorder, reports she thinks that this combination of meds seem to help  Slept good, feels less anxious,   Sleep:good  Appetite:ok  Medication side effects:denied  ROS:feels better  Mental Status Evaluation:  Appearance:  Dressed appropraitely, short stature white woman, slender built    Behavior:  cooperative   Speech:  normal   Mood:  Anxious, sad    Affect:  appropriate     Thought Process:  disorganzied   Thought Content:  obsessive , delusional    Perceptual Disturbances: Denied AV hallucination   Risk Potential: NO ROLA    Sensorium:  normal   Cognition:  intact   Consciousness:  Alert, OX3   Attention: Fair   Insight:  limited   Judgment: limited   Gait/Station: With in normal range   Motor Activity: With in normal range     Progress Toward Goals: working on current treatment goals, no changes  Made in treatment plan   Recommended Treatment: Continue with group therapy, milieu therapy and occupational therapy  Risks, benefits and possible side effects of Medications:   Risks, benefits, and possible side effects of medications explained to patient and patient verbalizes understanding        Medications:   current meds:   Current Facility-Administered Medications   Medication Dose Route Frequency    acetaminophen (TYLENOL) tablet 650 mg  650 mg Oral Q6H PRN    acetaminophen (TYLENOL) tablet 650 mg  650 mg Oral Q4H PRN    acetaminophen (TYLENOL) tablet 975 mg  975 mg Oral Q6H PRN    albuterol (PROVENTIL HFA,VENTOLIN HFA) inhaler 2 puff  2 puff Inhalation Q6H PRN    haloperidol lactate (HALDOL) injection 2 5 mg  2 5 mg Intramuscular Q4H PRN Max 6/day    And    LORazepam (ATIVAN) injection 1 mg  1 mg Intramuscular Q4H PRN Max 6/day    And    benztropine (COGENTIN) injection 0 5 mg  0 5 mg Intramuscular Q4H PRN Max 6/day    haloperidol lactate (HALDOL) injection 5 mg  5 mg Intramuscular Q4H PRN Max 4/day    And    LORazepam (ATIVAN) injection 2 mg  2 mg Intramuscular Q4H PRN Max 4/day    And    benztropine (COGENTIN) injection 1 mg  1 mg Intramuscular Q4H PRN Max 4/day    benztropine (COGENTIN) injection 1 mg  1 mg Intramuscular Q4H PRN Max 6/day    benztropine (COGENTIN) tablet 1 mg  1 mg Oral Q4H PRN Max 6/day    clonazePAM (KlonoPIN) tablet 0 5 mg  0 5 mg Oral TID    hydrOXYzine HCL (ATARAX) tablet 50 mg  50 mg Oral Q6H PRN Max 4/day    Or    diphenhydrAMINE (BENADRYL) injection 50 mg  50 mg Intramuscular Q6H PRN    diphenhydrAMINE (BENADRYL) tablet 25 mg  25 mg Oral HS    escitalopram (LEXAPRO) tablet 5 mg  5 mg Oral Daily    gabapentin (NEURONTIN) capsule 600 mg  600 mg Oral 4x Daily    hydrOXYzine HCL (ATARAX) tablet 100 mg  100 mg Oral Q6H PRN Max 4/day    Or    LORazepam (ATIVAN) injection 2 mg  2 mg Intramuscular Q6H PRN    hydrOXYzine HCL (ATARAX) tablet 25 mg  25 mg Oral Q6H PRN Max 4/day    lidocaine (LIDODERM) 5 % patch 1 patch  1 patch Topical Daily    nicotine (NICODERM CQ) 21 mg/24 hr TD 24 hr patch 1 patch  1 patch Transdermal Daily    risperiDONE (RisperDAL M-TABS) dispersible tablet 0 5 mg  0 5 mg Oral Q4H PRN Max 6/day    risperiDONE (RisperDAL M-TABS) dispersible tablet 1 mg  1 mg Oral Q4H PRN Max 3/day    risperiDONE (RisperDAL M-TABS) dispersible tablet 2 mg  2 mg Oral Q4H PRN Max 3/day    risperiDONE (RisperDAL) tablet 1 mg  1 mg Oral Daily    risperiDONE (RisperDAL) tablet 2 mg  2 mg Oral HS     Labs: NA    Assessment, Diagnosis  and Plan: continue with current meds and goals, F/U tomorrow with treatment team     Counseling / Coordination of Care  Total floor / unit time spent today20 minutes  minutes  Greater than 50% of total time was spent with the patient and / or family counseling and / or coordination of care   A description of the counseling / coordination of care:     Hero Kevin MD

## 2021-03-21 NOTE — PROGRESS NOTES
Patient remains in room 243  Sleep has been sound and steady  No respiratory distress noted  Maintained on q 7 minute checks  Will continue to monitor

## 2021-03-21 NOTE — PLAN OF CARE
Problem: Anxiety  Goal: Anxiety is at manageable level  Description: Interventions:  - Assess and monitor patient's anxiety level  - Monitor for signs and symptoms (heart palpitations, chest pain, shortness of breath, headaches, nausea, feeling jumpy, restlessness, irritable, apprehensive)  - Collaborate with interdisciplinary team and initiate plan and interventions as ordered    - [place] patient to unit/surroundings  - Explain treatment plan  - Encourage participation in care  - Encourage verbalization of concerns/fears  - Identify coping mechanisms  - Assist in developing anxiety-reducing skills  - Administer/offer alternative therapies  - Limit or eliminate stimulants  Outcome: Progressing

## 2021-03-21 NOTE — PROGRESS NOTES
Pacing in hallway the beginning of shift  Behaviors were controlled  Disorganized thinking noted during assessment  No overt agitation, however patient admitted to feeling unsafe on unit  Vague statements of paranoia expressed towards other patients  Complaint with medications  Sylvia Bowers asked to sleep in room 243 (unlocked) to feel "safe"  Has been sleeping well  Maintained on q 7 minute checks

## 2021-03-22 PROCEDURE — 99233 SBSQ HOSP IP/OBS HIGH 50: CPT | Performed by: NURSE PRACTITIONER

## 2021-03-22 RX ORDER — ESCITALOPRAM OXALATE 10 MG/1
10 TABLET ORAL DAILY
Status: DISCONTINUED | OUTPATIENT
Start: 2021-03-23 | End: 2021-03-30

## 2021-03-22 RX ORDER — RISPERIDONE 2 MG/1
2 TABLET, FILM COATED ORAL 2 TIMES DAILY
Status: DISCONTINUED | OUTPATIENT
Start: 2021-03-22 | End: 2021-03-23

## 2021-03-22 RX ADMIN — GABAPENTIN 600 MG: 300 CAPSULE ORAL at 21:23

## 2021-03-22 RX ADMIN — GABAPENTIN 600 MG: 300 CAPSULE ORAL at 08:17

## 2021-03-22 RX ADMIN — NICOTINE 1 PATCH: 21 PATCH, EXTENDED RELEASE TRANSDERMAL at 08:16

## 2021-03-22 RX ADMIN — CLONAZEPAM 0.5 MG: 0.5 TABLET ORAL at 21:23

## 2021-03-22 RX ADMIN — ESCITALOPRAM 5 MG: 5 TABLET, FILM COATED ORAL at 08:17

## 2021-03-22 RX ADMIN — LIDOCAINE 1 PATCH: 50 PATCH CUTANEOUS at 08:17

## 2021-03-22 RX ADMIN — CLONAZEPAM 0.5 MG: 0.5 TABLET ORAL at 13:00

## 2021-03-22 RX ADMIN — GABAPENTIN 600 MG: 300 CAPSULE ORAL at 13:00

## 2021-03-22 RX ADMIN — GABAPENTIN 600 MG: 300 CAPSULE ORAL at 18:00

## 2021-03-22 RX ADMIN — RISPERIDONE 1 MG: 1 TABLET ORAL at 08:17

## 2021-03-22 RX ADMIN — DIPHENHYDRAMINE HCL 25 MG: 25 TABLET ORAL at 21:22

## 2021-03-22 RX ADMIN — CLONAZEPAM 0.5 MG: 0.5 TABLET ORAL at 08:17

## 2021-03-22 NOTE — PROGRESS NOTES
Progress Note - [address] 53 y o  female MRN: [id number]   Unit/Bed#: [facility] 249-01 Encounter: [id number]    Behavior over the last 24 hours: zulay Bradley is a 78-year-old female with history of schizoaffective disorder bipolar type who presents for psychiatric follow-up  Staff reports the patient has been experiencing labile moods, tearful episodes and significant paranoid behavior  On approach, she is visibly upset and rather tearful  Her speech is rambling, bizarre and tangential, at times disorganized  She is somewhat disheveled appearing as well  Visibly paranoid and suspicious, stating that there are people out to get her  Also reports feeling suicidal, but clarifies that she has experienced only passive suicidal thoughts and adamantly denies any true suicidal intent or plan  She is able to contract for safety on the unit  She is perseverative that her Lexapro needs to be increased  She admits hearing voices and is clearly responding to internal stimuli  She denies visual hallucinations  She denies homicidal ideations      Sleep: frequent awakenings, nightmares  Appetite: normal  Medication side effects: No   ROS: no complaints, all other systems are negative    Mental Status Evaluation:    Appearance:  disheveled   Behavior:  bizarre, guarded, psychomotor agitation, responds to redirection   Speech:  tangential, disorganized, perseverative   Mood:  dysphoric, labile   Affect:  tearful   Thought Process:  disorganized, tangential, flight of ideas   Associations: tangential associations   Thought Content:  paranoid ideation, negative thinking, ruminating thoughts   Perceptual Disturbances: auditory hallucinations, appears responding to internal stimuli, no visual hallucinations   Risk Potential: Suicidal ideation - Yes, passive death wish, contracts for safety on the unit, would talk to staff if not feeling safe on the unit, would not feel safe if discharged  Homicidal ideation - None at present  Potential for aggression - No   Sensorium:  unable to assess   Memory:  recent and remote memory grossly intact   Consciousness:  alert and awake   Attention/Concentration: decreased concentration and decreased attention span   Insight:  impaired   Judgment: impaired due to psychosis   Gait/Station: normal gait/station, normal balance   Motor Activity: no abnormal movements     Vital signs in last 24 hours:    Temp:  [97 9 °F (36 6 °C)] 97 9 °F (36 6 °C)  HR:  [70-85] 85  Resp:  [16-18] 18  BP: (101-123)/(78-85) 101/78    Laboratory results: I have personally reviewed all pertinent laboratory/tests results    Most Recent Labs:   Lab Results   Component Value Date    WBC 9 70 03/16/2021    RBC 4 91 03/16/2021    HGB 14 9 03/16/2021    HCT 45 1 03/16/2021     03/16/2021    RDW 14 1 03/16/2021    NEUTROABS 7 10 (H) 03/16/2021    SODIUM 134 03/17/2021    K 4 0 03/17/2021    CL 99 03/17/2021    CO2 29 03/17/2021    BUN 14 03/17/2021    CREATININE 0 56 (L) 03/17/2021    GLUC 112 (H) 03/17/2021    CALCIUM 9 4 03/17/2021    AST 27 03/17/2021    ALT 18 03/17/2021    ALKPHOS 67 03/17/2021    TP 7 0 03/17/2021    ALB 4 2 03/17/2021    TBILI 0 40 03/17/2021    CHOLESTEROL 220 (H) 08/23/2020    HDL 73 08/23/2020    TRIG 218 (H) 08/23/2020    LDLCALC 103 (H) 08/23/2020    [place] 147 08/23/2020    AMMONIA 28 10/27/2017    USL7LTUYNAGK 2 190 10/24/2019    FREET4 0 89 03/24/2016    PREGUR negative 03/16/2021    PREGSERUM Negative 10/21/2019    HCG <2 00 04/10/2014    RPR Non-Reactive 10/25/2018    HGBA1C 5 0 08/06/2019    EAG 97 08/06/2019       Progress Toward Goals: regressed, more depressed, more disorganized, more paranoid, more psychotic, poor reality testing    Assessment/Plan   Principal Problem:    Schizoaffective disorder, bipolar type ([facility] )  Active Problems:    LYNN (generalized anxiety disorder)    Mild intermittent asthma without complication    Tobacco abuse      Recommended Treatment: Planned medication and treatment changes: All current active medications have been reviewed  Encourage group therapy, milieu therapy and occupational therapy  Behavioral Health checks every 7 minutes    Risperdal increased to 2 mg b i d  To target psychotic symptoms  Lexapro increased to 10 mg to target severe depressive symptoms with passive suicidal ideation  Patient is able to contract for safety and adamantly denies any suicidal intent or plan  Patient reportedly received the Invega Trinza 819 mg injection on 02/23/2021, per reports from case management  She may be a candidate for long-term treatment through [facility]      Current Facility-Administered Medications   Medication Dose Route Frequency Provider Last Rate    acetaminophen  650 mg Oral Q6H PRN Yulia Hakim, DO      acetaminophen  650 mg Oral Q4H PRN Yulia Hakim, DO      acetaminophen  975 mg Oral Q6H PRN Yulia Hakim, DO      albuterol  2 puff Inhalation Q6H PRN Yulia Hakim, DO      haloperidol lactate  2 5 mg Intramuscular Q4H PRN Max 6/day Yulia Hakim, DO      And    LORazepam  1 mg Intramuscular Q4H PRN Max 6/day Yulia Hakim, DO      And    benztropine  0 5 mg Intramuscular Q4H PRN Max 6/day Yulia Hakim, DO      haloperidol lactate  5 mg Intramuscular Q4H PRN Max 4/day Yluia Hakim, DO      And    LORazepam  2 mg Intramuscular Q4H PRN Max 4/day Yulia Hakim, DO      And    benztropine  1 mg Intramuscular Q4H PRN Max 4/day Yulia Hakim, DO      benztropine  1 mg Intramuscular Q4H PRN Max 6/day Yulia Hakim, DO      benztropine  1 mg Oral Q4H PRN Max 6/day Yulia Hakim, DO      bismuth subsalicylate  975 mg Oral Q6H PRN Salo Bermudez, DO      clonazePAM  0 5 mg Oral TID JARED Augustin      hydrOXYzine HCL  50 mg Oral Q6H PRN Max 4/day Yulia Hakim, DO      Or    diphenhydrAMINE  50 mg Intramuscular Q6H PRN Yulia Hakim, DO      diphenhydrAMINE  25 mg Oral APRIL NAGEL JARED Lama      escitalopram  5 mg Oral Daily Rima Hung MD      gabapentin  600 mg Oral 4x Daily Rima Hung MD      hydrOXYzine HCL  100 mg Oral Q6H PRN Max 4/day Anice Moment, DO      Or    LORazepam  2 mg Intramuscular Q6H PRN Anice Moment, DO      hydrOXYzine HCL  25 mg Oral Q6H PRN Max 4/day Anice Moment, DO      lidocaine  1 patch Topical Daily Laura Fischer MD      nicotine  1 patch Transdermal Daily Anice Moment, DO      risperiDONE  0 5 mg Oral Q4H PRN Max 6/day Anice Moment, DO      risperiDONE  1 mg Oral Q4H PRN Max 3/day Anice Moment, DO      risperiDONE  2 mg Oral Q4H PRN Max 3/day Anice Moment, DO      risperiDONE  2 mg Oral BID Rima Hung MD       Risks / Benefits of Treatment:    Risks, benefits, and possible side effects of medications explained to patient  Patient has limited understanding of risks and benefits of treatment at this time, but agrees to take medications as prescribed  Counseling / Coordination of Care:    Patient's progress discussed with staff in treatment team meeting  Medications, treatment progress and treatment plan reviewed with patient      Sixto Sepulveda PA-C 03/22/21

## 2021-03-22 NOTE — PROGRESS NOTES
Patient awake in bed in am  Patient was heard yelling out a few times to no one in particular  She was laying in bed when this nurse entered her room talking to herself  She was mainly irritable, defensive and dismissive but had moments of calm speech and polite statements  Observed walking in hallway but not very often  Non interactive with peers  Does not answer most assessment questions and gets easily aggitated when asked  Pt did come and ask nurse if she can call her outpatient counselor because that is something she normally does on Mondays  Pt took all scheduled PO medications with only minimal questions  Will maintain on safety precautions and continual monitoring  No needs identified

## 2021-03-22 NOTE — PROGRESS NOTES
Patient remains visible on unit  Pacing in hallway Denies HI/SI Remains paranoid blaming others   Q 7 min checks continue

## 2021-03-22 NOTE — PROGRESS NOTES
Patient  In room 243 ([facility])  At start of shift  Extremely agitated but not interested in talking  Yelling out randomly in 243  Labile  Calms down temporarily and then becomes agitated again  Not interested in prn medications  Back to her room and continued to yell at someone who was not in the room  Extremely irritable  Continues to decline prn medication  Attempts at verbal assessment of situation unsuccessful  Extended periods of controlled behavior and then begins yelling and muttering again  Asked to sleep in room 243 bc she feels safer there  Does not like the patient across the acharya from her  Says she is afraid of him  Patient given permission to sleep in 243 due to her level of agitation  Agreeable to taking her HS medications   After taking her nighttime meds she fell asleep within 45 minutes
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
## 2021-03-22 NOTE — SOCIAL WORK
Pt's CM from [facility], Collin Strange, [phone], called to inform SW that ACT's concern for pt is homelessness regarding which pt's presents placement difficulties due to her emotional / behavioral difficulties of screaming, having noted that pt would disrupt peers in a group home, even if they could access one in pt's behalf  Pt's same difficulties contributes to her placement in a shelter or IP rehab  Lisa Arzola verbalized that ACT would support pt's placement in an [facility] program, although she thinks pt would reject such as option, if available  She will call pt today  SW will update Lisa Arzola, as needed

## 2021-03-22 NOTE — PROGRESS NOTES
Patient has been sleeping the majority of the night  Restless at times in her sleep but has overall been sleeping well

## 2021-03-22 NOTE — PLAN OF CARE
Problem: DISCHARGE PLANNING - CARE MANAGEMENT  Goal: Discharge to post-acute care or home with appropriate resources  Description: INTERVENTIONS:  - Conduct assessment to determine patient/family and health care team treatment goals, and need for post-acute services based on payer coverage, community resources, and patient preferences, and barriers to discharge  - Address psychosocial, clinical, and financial barriers to discharge as identified in assessment in conjunction with the patient/family and health care team  - Arrange appropriate level of post-acute services according to patients   needs and preference and payer coverage in collaboration with the physician and health care team  - Communicate with and update the patient/family, physician, and health care team regarding progress on the discharge plan  - Arrange appropriate transportation to post-acute venues  Outcome: Progressing    Pt's CM from [facility], Ned Aschoff, [phone], called to inform SW that ACT's concern for pt is homelessness regarding which pt's presents placement difficulties due to her emotional / behavioral difficulties of screaming, having noted that pt would disrupt peers in a group home, even if they could access one in pt's behalf  Pt's same difficulties contributes to her placement in a shelter or IP rehab  Lata Arce verbalized that ACT would support pt's placement in an [facility] program, although she thinks pt would reject such as option, if available  SW will follow-up with team and pt and update ACT

## 2021-03-22 NOTE — PROGRESS NOTES
03/22/21 1000   Activity/Group Checklist   Group   ([employer]" Thoughts in Our Heads" Art Therapy )   Attendance Attended   Attendance Duration (min) 31-45   Interactions Disorganized interaction   Affect/Mood Blunted/flat;Constricted   Goals Achieved Able to listen to others; Able to engage in interactions

## 2021-03-22 NOTE — PROGRESS NOTES
03/22/21 1330   Activity/Group Checklist   Group   (Meditation and Collage Art)   Attendance Did not attend  (Group offered pt refused)

## 2021-03-22 NOTE — PROGRESS NOTES
03/17/21 1130   Team Meeting   Meeting Type Tx Team Meeting   Initial Conference Date 03/17/21   Team Members Present   Team Members Present Physician;Nurse;   Physician Team Member Dr Machelle Arteaga Team Member Laura Chaudhari, YUDY   Social Work Team Member Umm Candelaria   Patient/Family Present   Patient Present No   Patient's Family Present No     Treatment team members reviewed [address] that SW subsequently explained to pt who declined to participate in [address]  Pt agreed with goals and signed [address]

## 2021-03-22 NOTE — PROGRESS NOTES
03/22/21 0800   Team Meeting   Meeting Type Daily Rounds   Initial Conference Date 03/22/21   Team Members Present   Team Members Present Physician;Nurse;   Physician Team Member Dr Teresa Gerardo; Leigh Ann Holbrook PA-C   Nursing Team Member Maria C Romero RN   Social Work Team Member Tiara Benedict   Patient/Family Present   Patient Present No   Patient's Family Present No     Sunday patient was very labile, tearful and screaming, reporting the voices were really bad and coming in through the vents  Did put herself in the quite room few times over the weekend

## 2021-03-22 NOTE — PROGRESS NOTES
Patient was heard yelling in hallway  Her thoughts were delusional and accusitory  Non seniscal  She was able to walk into room 243 and de-escalated fairly quickly there  Has been observed walking quietly in the hallway since then

## 2021-03-22 NOTE — PROGRESS NOTES
03/22/21 0730   Activity/Group Checklist   Group   (Goal Planning and Communication )   Attendance Attended   Attendance Duration (min) 31-45   Interactions Disorganized interaction   Affect/Mood Angry; Wide   Goals Achieved Identified feelings; Able to listen to others
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
## 2021-03-23 PROCEDURE — 99232 SBSQ HOSP IP/OBS MODERATE 35: CPT | Performed by: PHYSICIAN ASSISTANT

## 2021-03-23 PROCEDURE — 82948 REAGENT STRIP/BLOOD GLUCOSE: CPT

## 2021-03-23 RX ORDER — RISPERIDONE 2 MG/1
2 TABLET, FILM COATED ORAL 2 TIMES DAILY
Status: DISCONTINUED | OUTPATIENT
Start: 2021-03-23 | End: 2021-03-25

## 2021-03-23 RX ADMIN — RISPERIDONE 2 MG: 2 TABLET ORAL at 08:09

## 2021-03-23 RX ADMIN — LIDOCAINE 1 PATCH: 50 PATCH CUTANEOUS at 08:10

## 2021-03-23 RX ADMIN — CLONAZEPAM 0.5 MG: 0.5 TABLET ORAL at 21:00

## 2021-03-23 RX ADMIN — GABAPENTIN 600 MG: 300 CAPSULE ORAL at 22:04

## 2021-03-23 RX ADMIN — GABAPENTIN 600 MG: 300 CAPSULE ORAL at 08:09

## 2021-03-23 RX ADMIN — GABAPENTIN 600 MG: 300 CAPSULE ORAL at 13:05

## 2021-03-23 RX ADMIN — ESCITALOPRAM OXALATE 10 MG: 10 TABLET ORAL at 08:09

## 2021-03-23 RX ADMIN — RISPERIDONE 2 MG: 2 TABLET ORAL at 21:00

## 2021-03-23 RX ADMIN — DIPHENHYDRAMINE HCL 25 MG: 25 TABLET ORAL at 22:06

## 2021-03-23 RX ADMIN — CLONAZEPAM 0.5 MG: 0.5 TABLET ORAL at 08:09

## 2021-03-23 RX ADMIN — NICOTINE 1 PATCH: 21 PATCH, EXTENDED RELEASE TRANSDERMAL at 08:10

## 2021-03-23 RX ADMIN — CLONAZEPAM 0.5 MG: 0.5 TABLET ORAL at 13:05

## 2021-03-23 RX ADMIN — GABAPENTIN 600 MG: 300 CAPSULE ORAL at 17:54

## 2021-03-23 NOTE — QUICK NOTE
Notified by nursing staff regarding possible pre-syncopal episode earlier today  Patient was in the acharya and leaning head against the wall not responding to verbal commands  No focal weakness noted by staff  BG was 87; VS no hypotension noted  Reviewed her records, not on any anti-hyperglycemic agents  Currently the patient is in seclusion room screaming and yelling  I was unable to perform any physical exam   Recommend that staff continue to watch the patient closely  Encourage p o  Intake including fluid  Make sure that the patient eat her meals to prevent any possible hypoglycemia   If symptoms recur staff will contact our team

## 2021-03-23 NOTE — PROGRESS NOTES
03/23/21 0756   Team Meeting   Meeting Type Daily Rounds   Initial Conference Date 03/23/21   Team Members Present   Team Members Present Physician;Nurse;   Physician Team Member Dr Rashawn Joyce; Nichole Schwarz PA-C   Nursing Team Member Laura Chaudhari, YUDY   Social Work Team Member Christian Thibodeaux [place]   Patient/Family Present   Patient Present No   Patient's Family Present No     ACT supports pt's referral to [facility] [place]  Improved hygiene  Delusional   Taking meds  Labile  Increased Respiral   Antonette Sepulvedak, 819 mg needs to be given on 5/23/21  Referral to [facility] [place]  May need 302

## 2021-03-23 NOTE — PLAN OF CARE
Problem: Potential for Falls  Goal: Patient will remain free of falls  Description: INTERVENTIONS:  - Assess patient frequently for physical needs  -  Identify cognitive and physical deficits and behaviors that affect risk of falls  -  [place] fall precautions as indicated by assessment   - Educate patient/family on patient safety including physical limitations  - Instruct patient to call for assistance with activity based on assessment  - Modify environment to reduce risk of injury  - Consider OT/PT consult to assist with strengthening/mobility  Outcome: Progressing     Problem: Nutrition/Hydration-ADULT  Goal: Nutrient/Hydration intake appropriate for improving, restoring or maintaining nutritional needs  Description: Monitor and assess patient's nutrition/hydration status for malnutrition  Collaborate with interdisciplinary team and initiate plan and interventions as ordered  Monitor patient's weight and dietary intake as ordered or per policy  Utilize nutrition screening tool and intervene as necessary  Determine patient's food preferences and provide high-protein, high-caloric foods as appropriate       INTERVENTIONS:  - Monitor oral intake, urinary output, labs, and treatment plans  - Assess nutrition and hydration status and recommend course of action  - Evaluate amount of meals eaten  - Assist patient with eating if necessary   - Allow adequate time for meals  - Recommend/ encourage appropriate diets, oral nutritional supplements, and vitamin/mineral supplements  - Order, calculate, and assess calorie counts as needed  - Recommend, monitor, and adjust tube feedings and TPN/PPN based on assessed needs  - Assess need for intravenous fluids  - Provide specific nutrition/hydration education as appropriate  - Include patient/family/caregiver in decisions related to nutrition  Outcome: Progressing     Problem: SUBSTANCE USE/ABUSE  Goal: Will have no detox symptoms and will verbalize plan for changing substance-related behavior  Description: INTERVENTIONS:  - Monitor physical status and assess for symptoms of withdrawal  - Administer medication as ordered  - Provide emotional support with 1 on 1 interaction with staff  - Encourage recovery focused program/ addiction education  - Assess for verbalization of changing behaviors related to substance abuse  - Initiate consults and referrals as appropriate (Case Management, Spiritual Care, etc )  Outcome: Progressing  Goal: By discharge, will develop insight into their chemical dependency and sustain motivation to continue in recovery  Description: INTERVENTIONS:  - Attends all daily group sessions and scheduled AA groups  - Actively practices coping skills through participation in the therapeutic community and adherence to program rules  - Reviews and completes assignments from individual treatment plan  - Assist patient development of understanding of their personal cycle of addiction and relapse triggers  Outcome: Progressing  Goal: By discharge, patient will have ongoing treatment plan addressing chemical dependency  Description: INTERVENTIONS:  - Assist patient with resources and/or appointments for ongoing recovery based living  Outcome: Progressing     Problem: SLEEP DISTURBANCE  Goal: Will exhibit normal sleeping pattern  Description: Interventions:  -  Assess the patients sleep pattern, noting recent changes  - Administer medication as ordered  - Decrease environmental stimuli, including noise, as appropriate during the night  - Encourage the patient to actively participate in unit groups and or exercise during the day to enhance ability to achieve adequate sleep at night  - Assess the patient, in the morning, encouraging a description of sleep experience  Outcome: Progressing     Problem: SELF CARE DEFICIT  Goal: Return ADL status to a safe level of function  Description: INTERVENTIONS:  - Administer medication as ordered  - Assess ADL deficits and provide assistive devices as needed  - Obtain PT/OT consults as needed  - Assist and instruct patient to increase activity and self care as tolerated  Outcome: Progressing     Problem: Alteration in Thoughts and Perception  Goal: Treatment Goal: Gain control of psychotic behaviors/thinking, reduce/eliminate presenting symptoms and demonstrate improved reality functioning upon discharge  Outcome: Progressing  Goal: Verbalize thoughts and feelings  Description: Interventions:  - Promote a nonjudgmental and trusting relationship with the patient through active listening and therapeutic communication  - Assess patient's level of functioning, behavior and potential for risk  - Engage patient in 1 on 1 interactions  - Encourage patient to express fears, feelings, frustrations, and discuss symptoms    - [place] patient to reality, help patient recognize reality-based thinking   - Administer medications as ordered and assess for potential side effects  - Provide the patient education related to the signs and symptoms of the illness and desired effects of prescribed medications  Interventions:  - Assess and re-assess patient's level of risk   - Engage patient in 1:1 interactions, daily, for a minimum of 15 minutes   - Encourage patient to express feelings, fears, frustrations, hopes   Outcome: Progressing  Goal: Refrain from acting on delusional thinking/internal stimuli  Description: Interventions:  - Monitor patient closely, per order   - Utilize least restrictive measures   - Set reasonable limits, give positive feedback for acceptable   - Administer medications as ordered and monitor of potential side effects  Outcome: Progressing  Goal: Agree to be compliant with medication regime, as prescribed and report medication side effects  Description: Interventions:  - Offer appropriate PRN medication and supervise ingestion; conduct AIMS, as needed   Outcome: Progressing  Goal: Attend and participate in unit activities, including therapeutic, recreational, and educational groups  Description: Interventions:  -Encourage Visitation and family involvement in care  Interventions:  - Provide therapeutic and educational activities daily, encourage attendance and participation, and document same in the medical record   Outcome: Progressing  Goal: Recognize dysfunctional thoughts, communicate reality-based thoughts at the time of discharge  Description: Interventions:  - Provide medication and psycho-education to assist patient in compliance and developing insight into his/her illness   Outcome: Progressing  Goal: Complete daily ADLs, including personal hygiene independently, as able  Description: Interventions:  - Observe, teach, and assist patient with ADLS  - Monitor and promote a balance of rest/activity, with adequate nutrition and elimination   Interventions:  - Observe, teach, and assist patient with ADLS  -  Monitor and promote a balance of rest/activity, with adequate nutrition and elimination   Outcome: Progressing     Problem: Ineffective Coping  Goal: Cooperates with admission process  Description: Interventions:   - Complete admission process  Outcome: Progressing  Goal: Identifies ineffective coping skills  Outcome: Progressing  Goal: Identifies healthy coping skills  Outcome: Progressing  Goal: Demonstrates healthy coping skills  Outcome: Progressing  Goal: Participates in unit activities  Description: Interventions:  - Provide therapeutic environment   - Provide required programming   - Redirect inappropriate behaviors   Outcome: Progressing  Goal: Patient/Family participate in treatment and DC plans  Description: Interventions:  - Provide therapeutic environment  Outcome: Progressing  Goal: Patient/Family verbalizes awareness of resources  Outcome: Progressing  Goal: Understands least restrictive measures  Description: Interventions:  - Utilize least restrictive behavior  Outcome: Progressing  Goal: Free from restraint events  Description: - Utilize least restrictive measures   - Provide behavioral interventions   - Redirect inappropriate behaviors   Outcome: Progressing     Problem: Depression  Goal: Verbalize thoughts and feelings  Description: Interventions:  - Promote a nonjudgmental and trusting relationship with the patient through active listening and therapeutic communication  - Assess patient's level of functioning, behavior and potential for risk  - Engage patient in 1 on 1 interactions  - Encourage patient to express fears, feelings, frustrations, and discuss symptoms    - [place] patient to reality, help patient recognize reality-based thinking   - Administer medications as ordered and assess for potential side effects  - Provide the patient education related to the signs and symptoms of the illness and desired effects of prescribed medications  Interventions:  - Assess and re-assess patient's level of risk   - Engage patient in 1:1 interactions, daily, for a minimum of 15 minutes   - Encourage patient to express feelings, fears, frustrations, hopes   Outcome: Progressing  Goal: Attend and participate in unit activities, including therapeutic, recreational, and educational groups  Description: Interventions:  -Encourage Visitation and family involvement in care  Interventions:  - Provide therapeutic and educational activities daily, encourage attendance and participation, and document same in the medical record   Outcome: Progressing  Goal: Complete daily ADLs, including personal hygiene independently, as able  Description: Interventions:  - Observe, teach, and assist patient with ADLS  - Monitor and promote a balance of rest/activity, with adequate nutrition and elimination   Interventions:  - Observe, teach, and assist patient with ADLS  -  Monitor and promote a balance of rest/activity, with adequate nutrition and elimination   Outcome: Progressing  Goal: Treatment Goal: Demonstrate behavioral control of depressive symptoms, verbalize feelings of improved mood/affect, and adopt new coping skills prior to discharge  Outcome: Progressing  Goal: Refrain from harming self  Description: Interventions:  - Monitor patient closely, per order   - Supervise medication ingestion, monitor effects and side effects   Outcome: Progressing  Goal: Refrain from isolation  Description: Interventions:  - Develop a trusting relationship   - Encourage socialization   Outcome: Progressing  Goal: Refrain from self-neglect  Outcome: Progressing     Problem: Anxiety  Goal: Anxiety is at manageable level  Description: Interventions:  - Assess and monitor patient's anxiety level  - Monitor for signs and symptoms (heart palpitations, chest pain, shortness of breath, headaches, nausea, feeling jumpy, restlessness, irritable, apprehensive)  - Collaborate with interdisciplinary team and initiate plan and interventions as ordered    - [place] patient to unit/surroundings  - Explain treatment plan  - Encourage participation in care  - Encourage verbalization of concerns/fears  - Identify coping mechanisms  - Assist in developing anxiety-reducing skills  - Administer/offer alternative therapies  - Limit or eliminate stimulants  Outcome: Progressing     Problem: DISCHARGE PLANNING - CARE MANAGEMENT  Goal: Discharge to post-acute care or home with appropriate resources  Description: INTERVENTIONS:  - Conduct assessment to determine patient/family and health care team treatment goals, and need for post-acute services based on payer coverage, community resources, and patient preferences, and barriers to discharge  - Address psychosocial, clinical, and financial barriers to discharge as identified in assessment in conjunction with the patient/family and health care team  - Arrange appropriate level of post-acute services according to patients   needs and preference and payer coverage in collaboration with the physician and health care team  - Communicate with and update the patient/family, physician, and health care team regarding progress on the discharge plan  - Arrange appropriate transportation to post-acute venues  Outcome: Progressing     Problem: Ineffective Coping  Goal: Participates in unit activities  Description: Interventions:  - Provide therapeutic environment   - Provide required programming   - Redirect inappropriate behaviors   Outcome: Progressing

## 2021-03-23 NOTE — PROGRESS NOTES
Patient bright, alert in am, requesting hygiene products to shower at arrival of shift  Pressured but controlled  Taking about how she "up five times last night" and didn't sleep well because of having "bad thoughts and bad feelings" pt walked back to room and began talking to herself , fairly loud and profane  Declines encouraged expresssion at this time  Will maintain on safety precautions and continual monitoring  No needs identified

## 2021-03-23 NOTE — PROGRESS NOTES
The patient noted to have near syncopal episode in the hallway at [phone], the patient heard yelling loudly about everyone "being inconsiderate" directly prior to episode  The patient assisted to chair by clinical staff, vital signs obtained, blood sugar 83  No loss of consciousness noted  The patient assisted to quiet room for closer monitoring, patient consumed 4 ounces orange juice and ate 100% of dinner meal  RN notified Dr Tiara Shin of the above episode via tiger text, Dr Yara Mcduffie came to unit to assess patient  Recommendation made to encourage patient to consume adequate amount of food and fluids  The patient exited quiet room and noted to be yelling loudly while in room, then came to hallway requesting a towel to shower  Q 7 minute safety checks maintained

## 2021-03-23 NOTE — PROGRESS NOTES
03/23/21 1000   Activity/Group Checklist   Group   (Creeative Writing and Art Therapy Processing)   Attendance Attended   Attendance Duration (min) Greater than 60   Interactions Interacted appropriately   Affect/Mood Appropriate;Calm   Goals Achieved Identified feelings; Identified triggers; Discussed coping strategies; Displayed empathy;Able to listen to others; Able to engage in interactions; Able to manage/cope with feelings; Able to recieve feedback; Able to give feedback to another

## 2021-03-23 NOTE — PROGRESS NOTES
Patient currently pacing unit with no distress noted  Tolerate  routine 6 pm medication  Continue on safety checks

## 2021-03-23 NOTE — PROGRESS NOTES
03/23/21 0719   Activity/Group Checklist   Group   (Goal Planning and Communication )   Attendance Attended   Attendance Duration (min) 46-60   Interactions Interacted appropriately   Affect/Mood Appropriate   Goals Achieved Identified feelings; Discussed coping strategies; Able to listen to others; Able to engage in interactions

## 2021-03-23 NOTE — PROGRESS NOTES
Patient up x 2 for drink and bathroom use  Patient stated to nurse, "I have pancreatic cancer"  Returned to bed without difficulty, no noted distress, no outbursts  Remains on 7" checks for safety and behaviors

## 2021-03-23 NOTE — PROGRESS NOTES
Progress Note - [address] 53 y o  female MRN: [id number]   Unit/Bed#: U 249-01 Encounter: [id number]    Behavior over the last 24 hours: unchanged  Chela Navarrete is a 44-year-old female with a history of schizoaffective disorder bipolar type who presents for psychiatric follow-up  Patient appears a bit less emotional relative to yesterday's encounter, though she still experiences prominent mood lability during conversation and is occasionally tearful  States that she had to go into the quiet room last night and suffered frequent awakening secondary to nightmares when trying to sleep last night  She describes her mood as very sad, I feel like I want to end things but I would never act on it because I am a [place]    She is able to contract for safety and feels the increased dose of Lexapro has helped her feel a bit less depressed relative to yesterday  Feels that the combination of the [place] injection and the increased dose of Risperdal have helped her think clearer," and states that her auditory hallucinations are still present but not quite as prominent  Her speech is more organized today, but she remains tangential and rambles at times  Endorses somatic delusions of hives and erythema on both arms, states this reaction has happened multiple times since her Invega injection last month and she believes that her arms swell and become puffy, though they appear normal on inspection  Has also endorsed delusions that she suffered pancreatic cancer in the past   She admits ideas of reference and thinks that other patients are talking about her when she laughs or experiences happiness      Sleep: frequent awakenings, restless sleep, nightmares  Appetite: normal  Medication side effects: No   ROS: reports back pain, all other systems are negative    Mental Status Evaluation:    Appearance:  age appropriate, casually dressed, adequate grooming   Behavior:  bizarre, guarded, evasive, gesturing, responds to redirection   Speech:  still tangential, less disorganized , not pressured   Mood:  depressed, dysphoric   Affect:  tearful, mood-congruent   Thought Process:  tangential, impaired abstract reasoning, concrete   Associations: tangential associations   Thought Content:  somatic delusions, paranoid ideation, ideas of reference   Perceptual Disturbances: no visual hallucinations, auditory hallucinations still present, but less frequent   Risk Potential: Suicidal ideation - Yes, fleeting suicidal thoughts, remorseful now, contracts for safety on the unit, would talk to staff if not feeling safe on the unit, would not feel safe if discharged  Homicidal ideation - None at present  Potential for aggression - No   Sensorium:  oriented to person, place and time/date   Memory:  recent and remote memory grossly intact   Consciousness:  alert and awake   Attention/Concentration: attention span and concentration are age appropriate   Insight:  limited   Judgment: poor   Gait/Station: normal gait/station, normal balance   Motor Activity: no abnormal movements     Vital signs in last 24 hours:    Temp:  [97 5 °F (36 4 °C)-97 9 °F (36 6 °C)] 97 9 °F (36 6 °C)  HR:  [63-75] 75  Resp:  [16-18] 18  BP: ()/(59-71) 90/59    Laboratory results: I have personally reviewed all pertinent laboratory/tests results    Most Recent Labs:   Lab Results   Component Value Date    WBC 9 70 03/16/2021    RBC 4 91 03/16/2021    HGB 14 9 03/16/2021    HCT 45 1 03/16/2021     03/16/2021    RDW 14 1 03/16/2021    NEUTROABS 7 10 (H) 03/16/2021    SODIUM 134 03/17/2021    K 4 0 03/17/2021    CL 99 03/17/2021    CO2 29 03/17/2021    BUN 14 03/17/2021    CREATININE 0 56 (L) 03/17/2021    GLUC 112 (H) 03/17/2021    CALCIUM 9 4 03/17/2021    AST 27 03/17/2021    ALT 18 03/17/2021    ALKPHOS 67 03/17/2021    TP 7 0 03/17/2021    ALB 4 2 03/17/2021    TBILI 0 40 03/17/2021    CHOLESTEROL 220 (H) 08/23/2020    HDL 73 08/23/2020 TRIG 218 (H) 08/23/2020    LDLCALC 103 (H) 08/23/2020    [place] 147 08/23/2020    AMMONIA 28 10/27/2017    MQH2XRTGCZQZ 2 190 10/24/2019    FREET4 0 89 03/24/2016    PREGUR negative 03/16/2021    PREGSERUM Negative 10/21/2019    HCG <2 00 04/10/2014    RPR Non-Reactive 10/25/2018    HGBA1C 5 0 08/06/2019    EAG 97 08/06/2019       Progress Toward Goals: Limited progress over past 24 hours, perhaps somewhat less depressed but still tearful with passive SI; remains psychotic, though subjectively she feels the increased dose of Risperdal is helping    Assessment/Plan   Principal Problem:    Schizoaffective disorder, bipolar type (HCC)  Active Problems:    LYNN (generalized anxiety disorder)    Mild intermittent asthma without complication    Tobacco abuse      Recommended Treatment:     Planned medication and treatment changes: All current active medications have been reviewed  Encourage group therapy, milieu therapy and occupational therapy  Behavioral Health checks every 7 minutes    Continue current medicines  Limited progress over the past 24 hours, but overall, patient remains quite sick  Discussed possible EAC referral with patient, she is willing to consider      Current Facility-Administered Medications   Medication Dose Route Frequency Provider Last Rate    acetaminophen  650 mg Oral Q6H PRN Manley Beatrice, DO      acetaminophen  650 mg Oral Q4H PRN [facility], DO      acetaminophen  975 mg Oral Q6H PRN Manley Beatrice, DO      albuterol  2 puff Inhalation Q6H PRN [facility], DO      haloperidol lactate  2 5 mg Intramuscular Q4H PRN Max 6/day [facility], DO      And    LORazepam  1 mg Intramuscular Q4H PRN Max 6/day [facility], DO      And    benztropine  0 5 mg Intramuscular Q4H PRN Max 6/day [facility], DO      haloperidol lactate  5 mg Intramuscular Q4H PRN Max 4/day [facility], DO      And    LORazepam  2 mg Intramuscular Q4H PRN Max 4/day [facility], DO      And    benztropine  1 mg Intramuscular Q4H PRN Max 4/day Real Fonder, DO      benztropine  1 mg Intramuscular Q4H PRN Max 6/day Real Fonder, DO      benztropine  1 mg Oral Q4H PRN Max 6/day Real Fonder, DO      bismuth subsalicylate  735 mg Oral Q6H PRN Malva Rouleau, DO      clonazePAM  0 5 mg Oral TID JARED Augustin      hydrOXYzine HCL  50 mg Oral Q6H PRN Max 4/day Real Fonder, DO      Or    diphenhydrAMINE  50 mg Intramuscular Q6H PRN Real Fonder, DO      diphenhydrAMINE  25 mg Oral HS JARED Jones      escitalopram  10 mg Oral Daily Annel Schreiber PA-C      gabapentin  600 mg Oral 4x Daily Esha Eaton MD      hydrOXYzine HCL  100 mg Oral Q6H PRN Max 4/day Real Fonder, DO      Or    LORazepam  2 mg Intramuscular Q6H PRN Real Fonder, DO      hydrOXYzine HCL  25 mg Oral Q6H PRN Max 4/day Real Fonder, DO      lidocaine  1 patch Topical Daily Laura Brown MD      nicotine  1 patch Transdermal Daily Real Fonder, DO      risperiDONE  0 5 mg Oral Q4H PRN Max 6/day Real Fonder, DO      risperiDONE  1 mg Oral Q4H PRN Max 3/day Real Fonder, DO      risperiDONE  2 mg Oral Q4H PRN Max 3/day Real Fonder, DO      risperiDONE  2 mg Oral BID Annel Schreiber PA-C       Risks / Benefits of Treatment:    Risks, benefits, and possible side effects of medications explained to patient and patient verbalizes understanding and agreement for treatment  Counseling / Coordination of Care:    Patient's progress discussed with staff in treatment team meeting  Medications, treatment progress and treatment plan reviewed with patient      Annel Schreiber PA-C 03/23/21

## 2021-03-24 LAB — GLUCOSE SERPL-MCNC: 83 MG/DL (ref 65–140)

## 2021-03-24 PROCEDURE — 99233 SBSQ HOSP IP/OBS HIGH 50: CPT | Performed by: PHYSICIAN ASSISTANT

## 2021-03-24 RX ADMIN — GABAPENTIN 600 MG: 300 CAPSULE ORAL at 13:24

## 2021-03-24 RX ADMIN — GABAPENTIN 600 MG: 300 CAPSULE ORAL at 20:37

## 2021-03-24 RX ADMIN — RISPERIDONE 2 MG: 2 TABLET ORAL at 20:35

## 2021-03-24 RX ADMIN — ESCITALOPRAM OXALATE 10 MG: 10 TABLET ORAL at 08:11

## 2021-03-24 RX ADMIN — CLONAZEPAM 0.5 MG: 0.5 TABLET ORAL at 13:24

## 2021-03-24 RX ADMIN — CLONAZEPAM 0.5 MG: 0.5 TABLET ORAL at 20:22

## 2021-03-24 RX ADMIN — CLONAZEPAM 0.5 MG: 0.5 TABLET ORAL at 08:12

## 2021-03-24 RX ADMIN — DIPHENHYDRAMINE HCL 25 MG: 25 TABLET ORAL at 20:37

## 2021-03-24 RX ADMIN — LIDOCAINE 1 PATCH: 50 PATCH CUTANEOUS at 08:10

## 2021-03-24 RX ADMIN — NICOTINE 1 PATCH: 21 PATCH, EXTENDED RELEASE TRANSDERMAL at 08:10

## 2021-03-24 RX ADMIN — RISPERIDONE 2 MG: 2 TABLET ORAL at 08:11

## 2021-03-24 RX ADMIN — GABAPENTIN 600 MG: 300 CAPSULE ORAL at 17:23

## 2021-03-24 RX ADMIN — GABAPENTIN 600 MG: 300 CAPSULE ORAL at 08:11

## 2021-03-24 NOTE — PROGRESS NOTES
03/24/21 1000   Activity/Group Checklist   Group   (Postive Strengths Art Therapy )   Attendance Attended   Attendance Duration (min) Greater than 60   Interactions Interacted appropriately   Affect/Mood Appropriate;Bright;Calm   Goals Achieved Identified feelings; Discussed coping strategies; Increased hopefulness; Able to listen to others; Able to engage in interactions; Able to reflect/comment on own behavior;Able to manage/cope with feelings

## 2021-03-24 NOTE — PROGRESS NOTES
Regular diet, pt is consuming %  Had near syncope episode yesterday, no hypoglycemia noted  Encourage meal/fluid intake  Pt has no concerns this morning, stated she is enjoying her meals  wt is stable 105#  Recommend continue regular diet  Recommend MVI daily

## 2021-03-24 NOTE — NURSING NOTE
Pt observed pacing the acharya walls yelling to herself  Upon returning to her room she began screaming very loudly and slamming her door  Upon approach pt is irritable and guarded- denies hearing voices but is internally preoccupied  Declines PRN for agitation stating that she is "fine and doesn't need any help"  Quiet room was also offered to pt to which she declined  Will CTM  Q 7 minute checks in progress

## 2021-03-24 NOTE — PROGRESS NOTES
Patient bright in am, compliant with all scheduled medications without any hesitation  Pt attending groups with participation  Remains delusional but less so  Less pressured, more calm  No yelling or talking to self observed  Will maintain on safety precautions and continual monitoring ,No needs identified

## 2021-03-24 NOTE — PROGRESS NOTES
03/24/21 0800   Team Meeting   Meeting Type Daily Rounds   Initial Conference Date 03/24/21   Team Members Present   Team Members Present Physician;Nurse;   Physician Team Member Dr Nicki Schwarz; Maria Teresa Guardado PA-C   Nursing Team Member Eduar Hernandez, RN   Social Work Team Member Tiara Urrutia   Patient/Family Present   Patient Present No   Patient's Family Present No     CM working on arranging a AI Diversion meeting with [facility]/DS to discuss treatment team's recommendation of an [facility] referral for patient  Attending groups  More social  Apologizing for her outbursts  Not screaming at all last night or this morning  Mood is still labile at times  Delusional at times

## 2021-03-24 NOTE — PROGRESS NOTES
Progress Note - [address] 53 y o  female MRN: [id number]   Unit/Bed#: [facility] 249-01 Encounter: [id number]    Behavior over the last 24 hours: unchanged  Lorinda Apgar is a 64-year-old female with a history of schizoaffective disorder bipolar type who presents for psychiatric follow-up  Patient appears somewhat more calm upon approach today  She is no longer tearful and staff reports that she is no longer having any screaming episodes  She feels as if she is improving and states, I feel more calm my thinking is getting clearer    She does feel that the increased dose of Risperdal has been beneficial but notes some increased appetite as a side effect  She denies any thoughts of self-harm at this time and denies any suicidal ideations  She feels medication noncompliance was a significant contributor for her mental health in the past   However, she is concerned that her mood has been all over the place over the past few months despite good compliance with her medicines  Continues to endorse paranoid delusions that people are stalking me, and she is having difficulty determining who is friend and who is foe  Does not endorse any somatic delusions today  Auditory hallucinations remain present but less frequent      Sleep: normal  Appetite: increased  Medication side effects: Yes - nightmares   ROS: no complaints, all other systems are negative    Mental Status Evaluation:    Appearance:  age appropriate, adequate grooming, wearing a robe   Behavior:  cooperative, calm, bizarre   Speech:  normal rate and volume, fluent, clear, less disorganized    Mood:  improved, Not as depressed or dysphoric, more euthymic today   Affect:  flat, no longer tearful   Thought Process:  normal rate of thoughts, more organized, still at times tangential   Associations: tangential associations   Thought Content:  paranoid delusions, no somatic delusions elicited today   Perceptual Disturbances: no visual hallucinations, auditory hallucinations with negative comments still present, but less frequent   Risk Potential: Suicidal ideation - None at present, contracts for safety on the unit, would talk to staff if not feeling safe on the unit, would not feel safe if discharged  Homicidal ideation - None at present  Potential for aggression - No   Sensorium:  oriented to person, place and time/date   Memory:  recent and remote memory grossly intact   Consciousness:  alert and awake   Attention/Concentration: attention span and concentration appear shorter than expected for age   Insight:  limited   Judgment: poor   Gait/Station: normal gait/station, normal balance   Motor Activity: no abnormal movements     Vital signs in last 24 hours:    Temp:  [97 2 °F (36 2 °C)-97 6 °F (36 4 °C)] 97 2 °F (36 2 °C)  HR:  [] 108  Resp:  [16-18] 18  BP: ()/(62-67) 96/64    Laboratory results: I have personally reviewed all pertinent laboratory/tests results    Most Recent Labs:   Lab Results   Component Value Date    WBC 9 70 03/16/2021    RBC 4 91 03/16/2021    HGB 14 9 03/16/2021    HCT 45 1 03/16/2021     03/16/2021    RDW 14 1 03/16/2021    NEUTROABS 7 10 (H) 03/16/2021    SODIUM 134 03/17/2021    K 4 0 03/17/2021    CL 99 03/17/2021    CO2 29 03/17/2021    BUN 14 03/17/2021    CREATININE 0 56 (L) 03/17/2021    GLUC 112 (H) 03/17/2021    CALCIUM 9 4 03/17/2021    AST 27 03/17/2021    ALT 18 03/17/2021    ALKPHOS 67 03/17/2021    TP 7 0 03/17/2021    ALB 4 2 03/17/2021    TBILI 0 40 03/17/2021    CHOLESTEROL 220 (H) 08/23/2020    HDL 73 08/23/2020    TRIG 218 (H) 08/23/2020    LDLCALC 103 (H) 08/23/2020    NONHDLC 147 08/23/2020    AMMONIA 28 10/27/2017    TJW8JDIAWAVI 2 190 10/24/2019    FREET4 0 89 03/24/2016    PREGUR negative 03/16/2021    PREGSERUM Negative 10/21/2019    HCG <2 00 04/10/2014    RPR Non-Reactive 10/25/2018    HGBA1C 5 0 08/06/2019    EAG 97 08/06/2019       Progress Toward Goals: Perhaps some improvement today, less tearful, no longer endorsing thoughts of self-harm, somewhat more organized but still at times tangential and bizarre; continues with paranoid delusions that people are stalking her    Assessment/Plan   Principal Problem:    Schizoaffective disorder, bipolar type ([address] )  Active Problems:    LYNN (generalized anxiety disorder)    Mild intermittent asthma without complication    Tobacco abuse      Recommended Treatment:     Planned medication and treatment changes: All current active medications have been reviewed  Encourage group therapy, milieu therapy and occupational therapy  Behavioral Health checks every 7 minutes    Continue current medicines  Perhaps slight improvement over the past 24 hours as described in the HPI  Patient remains an appropriate candidate for EAC and she is in agreement with this plan    Case management currently working on referral     Current Facility-Administered Medications   Medication Dose Route Frequency Provider Last Rate    acetaminophen  650 mg Oral Q6H PRN Janetta Dellen, DO      acetaminophen  650 mg Oral Q4H PRN Janetta Dellen, DO      acetaminophen  975 mg Oral Q6H PRN Janetta Dellen, DO      albuterol  2 puff Inhalation Q6H PRN Brittaney Dellen, DO      haloperidol lactate  2 5 mg Intramuscular Q4H PRN Max 6/day Janetta Dellen, DO      And    LORazepam  1 mg Intramuscular Q4H PRN Max 6/day Janetta Dellen, DO      And    benztropine  0 5 mg Intramuscular Q4H PRN Max 6/day Janetta Dellen, DO      haloperidol lactate  5 mg Intramuscular Q4H PRN Max 4/day Janetta Dellen, DO      And    LORazepam  2 mg Intramuscular Q4H PRN Max 4/day Janetta Dellen, DO      And    benztropine  1 mg Intramuscular Q4H PRN Max 4/day Janetta Dellen, DO      benztropine  1 mg Intramuscular Q4H PRN Max 6/day Janetta Dellen, DO      benztropine  1 mg Oral Q4H PRN Max 6/day Janetta Dellen, DO      bismuth subsalicylate  350 mg Oral Q6H PRN Derrell Newman, DO  clonazePAM  0 5 mg Oral TID JARED Augustin      hydrOXYzine HCL  50 mg Oral Q6H PRN Max 4/day Margart Grime, DO      Or    diphenhydrAMINE  50 mg Intramuscular Q6H PRN Margart Grime, DO      diphenhydrAMINE  25 mg Oral HS JARED Jones      escitalopram  10 mg Oral Daily Jayson Overall, PA-C      gabapentin  600 mg Oral 4x Daily Chema Patterson MD      hydrOXYzine HCL  100 mg Oral Q6H PRN Max 4/day Margart Grime, DO      Or    LORazepam  2 mg Intramuscular Q6H PRN Margart Grime, DO      hydrOXYzine HCL  25 mg Oral Q6H PRN Max 4/day Margart Grime, DO      lidocaine  1 patch Topical Daily Alex Wang MD      nicotine  1 patch Transdermal Daily Margart Grime, DO      risperiDONE  0 5 mg Oral Q4H PRN Max 6/day Margart Grime, DO      risperiDONE  1 mg Oral Q4H PRN Max 3/day Margart Grime, DO      risperiDONE  2 mg Oral Q4H PRN Max 3/day Margart Grime, DO      risperiDONE  2 mg Oral BID Jayson Overall, PA-C       Risks / Benefits of Treatment:    Risks, benefits, and possible side effects of medications explained to patient and patient verbalizes understanding and agreement for treatment  Counseling / Coordination of Care:    Patient's progress discussed with staff in treatment team meeting  Medications, treatment progress and treatment plan reviewed with patient      Jayson Overall, PA-C 03/24/21

## 2021-03-24 NOTE — PROGRESS NOTES
03/24/21 0730   Activity/Group Checklist   Group   (Goal Planning and Communication )   Attendance Attended   Attendance Duration (min) 46-60   Interactions Interacted appropriately   Affect/Mood Appropriate   Goals Achieved Identified feelings; Discussed coping strategies; Able to listen to others; Able to engage in interactions

## 2021-03-24 NOTE — NURSING NOTE
Pt received on the unit awake and alert in between the halls and her bedroom , she denies any depression, anxiety, SI/HI, Sylvia Bowers approached me stating how she wants to apologize to staff for being difficult at times, she also asked why mask were needed if everyone here is negative for covid  Sylvia Bowers presented very calm, pleasant and cooperative, she listened to  Music on the ipad for a short while and returned it  Sylvia Bowers was able to make needs known, she was compliant with medications and positive for pm snack  Q7 minute checks continued

## 2021-03-24 NOTE — PROGRESS NOTES
03/24/21 1330   Activity/Group Checklist   Group   (Meditation and Creative Processing)   Attendance Attended   Attendance Duration (min) Greater than 60   Interactions Interacted appropriately   Affect/Mood Appropriate   Goals Achieved Identified feelings; Discussed coping strategies; Able to listen to others; Able to engage in interactions; Able to manage/cope with feelings

## 2021-03-24 NOTE — PLAN OF CARE
Problem: Potential for Falls  Goal: Patient will remain free of falls  Description: INTERVENTIONS:  - Assess patient frequently for physical needs  -  Identify cognitive and physical deficits and behaviors that affect risk of falls  -  [place] fall precautions as indicated by assessment   - Educate patient/family on patient safety including physical limitations  - Instruct patient to call for assistance with activity based on assessment  - Modify environment to reduce risk of injury  - Consider OT/PT consult to assist with strengthening/mobility  Outcome: Progressing     Problem: Nutrition/Hydration-ADULT  Goal: Nutrient/Hydration intake appropriate for improving, restoring or maintaining nutritional needs  Description: Monitor and assess patient's nutrition/hydration status for malnutrition  Collaborate with interdisciplinary team and initiate plan and interventions as ordered  Monitor patient's weight and dietary intake as ordered or per policy  Utilize nutrition screening tool and intervene as necessary  Determine patient's food preferences and provide high-protein, high-caloric foods as appropriate       INTERVENTIONS:  - Monitor oral intake, urinary output, labs, and treatment plans  - Assess nutrition and hydration status and recommend course of action  - Evaluate amount of meals eaten  - Assist patient with eating if necessary   - Allow adequate time for meals  - Recommend/ encourage appropriate diets, oral nutritional supplements, and vitamin/mineral supplements  - Order, calculate, and assess calorie counts as needed  - Recommend, monitor, and adjust tube feedings and TPN/PPN based on assessed needs  - Assess need for intravenous fluids  - Provide specific nutrition/hydration education as appropriate  - Include patient/family/caregiver in decisions related to nutrition  Outcome: Progressing     Problem: SUBSTANCE USE/ABUSE  Goal: Will have no detox symptoms and will verbalize plan for changing substance-related behavior  Description: INTERVENTIONS:  - Monitor physical status and assess for symptoms of withdrawal  - Administer medication as ordered  - Provide emotional support with 1 on 1 interaction with staff  - Encourage recovery focused program/ addiction education  - Assess for verbalization of changing behaviors related to substance abuse  - Initiate consults and referrals as appropriate (Case Management, Spiritual Care, etc )  Outcome: Progressing  Goal: By discharge, will develop insight into their chemical dependency and sustain motivation to continue in recovery  Description: INTERVENTIONS:  - Attends all daily group sessions and scheduled AA groups  - Actively practices coping skills through participation in the therapeutic community and adherence to program rules  - Reviews and completes assignments from individual treatment plan  - Assist patient development of understanding of their personal cycle of addiction and relapse triggers  Outcome: Progressing  Goal: By discharge, patient will have ongoing treatment plan addressing chemical dependency  Description: INTERVENTIONS:  - Assist patient with resources and/or appointments for ongoing recovery based living  Outcome: Progressing     Problem: SLEEP DISTURBANCE  Goal: Will exhibit normal sleeping pattern  Description: Interventions:  -  Assess the patients sleep pattern, noting recent changes  - Administer medication as ordered  - Decrease environmental stimuli, including noise, as appropriate during the night  - Encourage the patient to actively participate in unit groups and or exercise during the day to enhance ability to achieve adequate sleep at night  - Assess the patient, in the morning, encouraging a description of sleep experience  Outcome: Progressing     Problem: SELF CARE DEFICIT  Goal: Return ADL status to a safe level of function  Description: INTERVENTIONS:  - Administer medication as ordered  - Assess ADL deficits and provide assistive devices as needed  - Obtain PT/OT consults as needed  - Assist and instruct patient to increase activity and self care as tolerated  Outcome: Progressing     Problem: Alteration in Thoughts and Perception  Goal: Treatment Goal: Gain control of psychotic behaviors/thinking, reduce/eliminate presenting symptoms and demonstrate improved reality functioning upon discharge  Outcome: Progressing  Goal: Verbalize thoughts and feelings  Description: Interventions:  - Promote a nonjudgmental and trusting relationship with the patient through active listening and therapeutic communication  - Assess patient's level of functioning, behavior and potential for risk  - Engage patient in 1 on 1 interactions  - Encourage patient to express fears, feelings, frustrations, and discuss symptoms    - [place] patient to reality, help patient recognize reality-based thinking   - Administer medications as ordered and assess for potential side effects  - Provide the patient education related to the signs and symptoms of the illness and desired effects of prescribed medications  Interventions:  - Assess and re-assess patient's level of risk   - Engage patient in 1:1 interactions, daily, for a minimum of 15 minutes   - Encourage patient to express feelings, fears, frustrations, hopes   Outcome: Progressing  Goal: Refrain from acting on delusional thinking/internal stimuli  Description: Interventions:  - Monitor patient closely, per order   - Utilize least restrictive measures   - Set reasonable limits, give positive feedback for acceptable   - Administer medications as ordered and monitor of potential side effects  Outcome: Progressing  Goal: Agree to be compliant with medication regime, as prescribed and report medication side effects  Description: Interventions:  - Offer appropriate PRN medication and supervise ingestion; conduct AIMS, as needed   Outcome: Progressing  Goal: Attend and participate in unit activities, including therapeutic, recreational, and educational groups  Description: Interventions:  -Encourage Visitation and family involvement in care  Interventions:  - Provide therapeutic and educational activities daily, encourage attendance and participation, and document same in the medical record   Outcome: Progressing  Goal: Recognize dysfunctional thoughts, communicate reality-based thoughts at the time of discharge  Description: Interventions:  - Provide medication and psycho-education to assist patient in compliance and developing insight into his/her illness   Outcome: Progressing  Goal: Complete daily ADLs, including personal hygiene independently, as able  Description: Interventions:  - Observe, teach, and assist patient with ADLS  - Monitor and promote a balance of rest/activity, with adequate nutrition and elimination   Interventions:  - Observe, teach, and assist patient with ADLS  -  Monitor and promote a balance of rest/activity, with adequate nutrition and elimination   Outcome: Progressing     Problem: Ineffective Coping  Goal: Cooperates with admission process  Description: Interventions:   - Complete admission process  Outcome: Progressing  Goal: Identifies ineffective coping skills  Outcome: Progressing  Goal: Identifies healthy coping skills  Outcome: Progressing  Goal: Demonstrates healthy coping skills  Outcome: Progressing  Goal: Participates in unit activities  Description: Interventions:  - Provide therapeutic environment   - Provide required programming   - Redirect inappropriate behaviors   Outcome: Progressing  Goal: Patient/Family participate in treatment and DC plans  Description: Interventions:  - Provide therapeutic environment  Outcome: Progressing  Goal: Patient/Family verbalizes awareness of resources  Outcome: Progressing  Goal: Understands least restrictive measures  Description: Interventions:  - Utilize least restrictive behavior  Outcome: Progressing  Goal: Free from restraint events  Description: - Utilize least restrictive measures   - Provide behavioral interventions   - Redirect inappropriate behaviors   Outcome: Progressing     Problem: Depression  Goal: Verbalize thoughts and feelings  Description: Interventions:  - Promote a nonjudgmental and trusting relationship with the patient through active listening and therapeutic communication  - Assess patient's level of functioning, behavior and potential for risk  - Engage patient in 1 on 1 interactions  - Encourage patient to express fears, feelings, frustrations, and discuss symptoms    - [place] patient to reality, help patient recognize reality-based thinking   - Administer medications as ordered and assess for potential side effects  - Provide the patient education related to the signs and symptoms of the illness and desired effects of prescribed medications  Interventions:  - Assess and re-assess patient's level of risk   - Engage patient in 1:1 interactions, daily, for a minimum of 15 minutes   - Encourage patient to express feelings, fears, frustrations, hopes   Outcome: Progressing  Goal: Attend and participate in unit activities, including therapeutic, recreational, and educational groups  Description: Interventions:  -Encourage Visitation and family involvement in care  Interventions:  - Provide therapeutic and educational activities daily, encourage attendance and participation, and document same in the medical record   Outcome: Progressing  Goal: Complete daily ADLs, including personal hygiene independently, as able  Description: Interventions:  - Observe, teach, and assist patient with ADLS  - Monitor and promote a balance of rest/activity, with adequate nutrition and elimination   Interventions:  - Observe, teach, and assist patient with ADLS  -  Monitor and promote a balance of rest/activity, with adequate nutrition and elimination   Outcome: Progressing  Goal: Treatment Goal: Demonstrate behavioral control of depressive symptoms, verbalize feelings of improved mood/affect, and adopt new coping skills prior to discharge  Outcome: Progressing  Goal: Refrain from harming self  Description: Interventions:  - Monitor patient closely, per order   - Supervise medication ingestion, monitor effects and side effects   Outcome: Progressing  Goal: Refrain from isolation  Description: Interventions:  - Develop a trusting relationship   - Encourage socialization   Outcome: Progressing  Goal: Refrain from self-neglect  Outcome: Progressing     Problem: Anxiety  Goal: Anxiety is at manageable level  Description: Interventions:  - Assess and monitor patient's anxiety level  - Monitor for signs and symptoms (heart palpitations, chest pain, shortness of breath, headaches, nausea, feeling jumpy, restlessness, irritable, apprehensive)  - Collaborate with interdisciplinary team and initiate plan and interventions as ordered    - [place] patient to unit/surroundings  - Explain treatment plan  - Encourage participation in care  - Encourage verbalization of concerns/fears  - Identify coping mechanisms  - Assist in developing anxiety-reducing skills  - Administer/offer alternative therapies  - Limit or eliminate stimulants  Outcome: Progressing     Problem: DISCHARGE PLANNING - CARE MANAGEMENT  Goal: Discharge to post-acute care or home with appropriate resources  Description: INTERVENTIONS:  - Conduct assessment to determine patient/family and health care team treatment goals, and need for post-acute services based on payer coverage, community resources, and patient preferences, and barriers to discharge  - Address psychosocial, clinical, and financial barriers to discharge as identified in assessment in conjunction with the patient/family and health care team  - Arrange appropriate level of post-acute services according to patients   needs and preference and payer coverage in collaboration with the physician and health care team  - Communicate with and update the patient/family, physician, and health care team regarding progress on the discharge plan  - Arrange appropriate transportation to post-acute venues  Outcome: Progressing     Problem: Ineffective Coping  Goal: Participates in unit activities  Description: Interventions:  - Provide therapeutic environment   - Provide required programming   - Redirect inappropriate behaviors   Outcome: Progressing

## 2021-03-25 PROCEDURE — 99232 SBSQ HOSP IP/OBS MODERATE 35: CPT | Performed by: PHYSICIAN ASSISTANT

## 2021-03-25 RX ORDER — OLANZAPINE 5 MG/1
5 TABLET, ORALLY DISINTEGRATING ORAL ONCE
Status: COMPLETED | OUTPATIENT
Start: 2021-03-25 | End: 2021-03-25

## 2021-03-25 RX ADMIN — GABAPENTIN 600 MG: 300 CAPSULE ORAL at 17:39

## 2021-03-25 RX ADMIN — RISPERIDONE 2 MG: 2 TABLET ORAL at 08:08

## 2021-03-25 RX ADMIN — NICOTINE 1 PATCH: 21 PATCH, EXTENDED RELEASE TRANSDERMAL at 08:08

## 2021-03-25 RX ADMIN — GABAPENTIN 600 MG: 300 CAPSULE ORAL at 08:08

## 2021-03-25 RX ADMIN — ESCITALOPRAM OXALATE 10 MG: 10 TABLET ORAL at 08:08

## 2021-03-25 RX ADMIN — OLANZAPINE 5 MG: 5 TABLET, ORALLY DISINTEGRATING ORAL at 14:43

## 2021-03-25 RX ADMIN — RISPERIDONE 3 MG: 2 TABLET ORAL at 21:37

## 2021-03-25 RX ADMIN — DIPHENHYDRAMINE HCL 25 MG: 25 TABLET ORAL at 21:37

## 2021-03-25 RX ADMIN — GABAPENTIN 600 MG: 300 CAPSULE ORAL at 12:33

## 2021-03-25 RX ADMIN — CLONAZEPAM 0.5 MG: 0.5 TABLET ORAL at 12:33

## 2021-03-25 RX ADMIN — CLONAZEPAM 0.5 MG: 0.5 TABLET ORAL at 08:08

## 2021-03-25 RX ADMIN — LIDOCAINE 1 PATCH: 50 PATCH CUTANEOUS at 08:09

## 2021-03-25 RX ADMIN — CLONAZEPAM 0.5 MG: 0.5 TABLET ORAL at 21:37

## 2021-03-25 RX ADMIN — GABAPENTIN 600 MG: 300 CAPSULE ORAL at 21:36

## 2021-03-25 NOTE — PROGRESS NOTES
03/25/21 0805   Team Meeting   Meeting Type Daily Rounds   Initial Conference Date 03/25/21   Team Members Present   Team Members Present Physician;Nurse;   Physician Team Member Dr Hari Choi; Bipin Garcias   Nursing Team Member Ranulfo Sue, YUDY   Social Work Team Member [place], [place]   Patient/Family Present   Patient Present No   Patient's Family Present No     Pt verbalized desire for [facility] that [facility] supports  SW will call CC to schedule [facility] mtg for EAC  Seemed improving, but had one outburst during a psychotic episode yesterday  Chronic persecutory delusions; cannot differentiate friend from foe  More overnight decompensation  Risperdal will be increased to two doses of 3 mg daily to quell outbursts

## 2021-03-25 NOTE — PROGRESS NOTES
03/25/21 1000   Activity/Group Checklist   Group   ([employer])   Attendance Attended   Attendance Duration (min) 31-45   Interactions Interacted appropriately   Affect/Mood Appropriate;Calm   Goals Achieved Identified feelings; Identified triggers; Able to listen to others; Able to engage in interactions; Able to reflect/comment on own behavior;Able to manage/cope with feelings

## 2021-03-25 NOTE — PROGRESS NOTES
Patient observed sleeping comfortably for majority of q7 minute monitoring throughout the night, without demonstrating any signs or symptoms of distress  No acute behaviors overnight  Non-labored breathing noted  Will remain on safety precautions and continual q7 minute monitoring

## 2021-03-25 NOTE — PROGRESS NOTES
Pt sought out this writer for conversation this am  Pt presented clean and kempt, hair nicely braided and reporting that she is feeling more like herself again today  During conversation, pt offered strong eye contact  Shared organized thought regarding her current situation with occasional tangential ideas  Pt very clearly admitted to ongoing paranoia, feeling that staff and patients are "in on" hypnotizing her in addition to the frequent sensation that she is being touched (mentioned her hx of PTSD)  Also acknowledged her occasional outbursts that she apologizes for as she knows they can be disruptive, but says they are her reaction to the experiences  In sharing that she has been unsuccessful in the community time and time again, has failed ACT services, and is "unable to care for herself", pt stated "I need someone to fight for me to make it to [facility], I can't be successful caring for myself and I can't do the men thing anymore"  Pt feelings validated and support offered  Positive reinforcement provided for insight  This writer shared with patient intent to support her goals  Informed CM MM of pt statements  Informed art therapist KK of patient request to attempt separate programming for concentration sake  Also provided pt with positive affirmations to keep in room, to counteract paranoia  Pt thankful for interaction  Conversation ended mutually

## 2021-03-25 NOTE — PROGRESS NOTES
Patient bright alert early in am  Showered early and braided her hair  She had a calm and seemingly appropriate conversation with her  She did tell me she spoke with her daughter and was wondering about visiting policies right now  Pt was understanding of the COVID restrictions at this time  Pt sitting in the dining room waiting for groups to start  No yelling this morning  Compliant with medications without hesitation  Pt says she feels better but still complains of feelings of paranoia and people messing with her life  Will maintain on safety precautions and continual monitoring  No needs identified

## 2021-03-25 NOTE — SOCIAL WORK
SW met with pt who expressed willingness for tx at [facility], having stated that she is going through a lot  She thinks that EAC would assist her to stabilize  SW spoke with pt's staff Shila Guzman at [facility] who related that she will call pt to express ACT's support for [facility] placement

## 2021-03-25 NOTE — PROGRESS NOTES
Patient continues to be intermittently visible in the milieu, often restless and irritable  Patient observed to have outbursts during previous shift and had another this evening to to internal stimuli  She has been screaming to herself referring to staff and "the voices" as "sick, twisted, bit**es"  Patient is able to deescalate with reassurance  She is guarded and paranoid upon approach  Declines any PRNs but agreeable to take scheduled medication early  States "I don't take anything other than what's prescribed"  Denies needs  No SI or HI expressed  No other concerns identified  Will remain on safety precautions and continual monitoring

## 2021-03-25 NOTE — PROGRESS NOTES
Patient had 1 yelling outburst this shift  She was alone in her room and began screaming at "someone"  While aggitated, she was delusional and yelling at "whoever he is that is fu**ing with my life" pt is able to express she is upset because she is tired of her mental health always having her admitted to the hospital and  That she just wants to live a "normal life"  She walked into room 243 and was able to pace around the room and calm down in a few minutes  She had declined prns and other interventions

## 2021-03-25 NOTE — PLAN OF CARE
Problem: DISCHARGE PLANNING - CARE MANAGEMENT  Goal: Discharge to post-acute care or home with appropriate resources  Description: INTERVENTIONS:  - Conduct assessment to determine patient/family and health care team treatment goals, and need for post-acute services based on payer coverage, community resources, and patient preferences, and barriers to discharge  - Address psychosocial, clinical, and financial barriers to discharge as identified in assessment in conjunction with the patient/family and health care team  - Arrange appropriate level of post-acute services according to patients   needs and preference and payer coverage in collaboration with the physician and health care team  - Communicate with and update the patient/family, physician, and health care team regarding progress on the discharge plan  - Arrange appropriate transportation to post-acute venues  Outcome: Progressing   SW met with pt who expressed willingness for tx at [facility], having stated that she is going through a lot  She thinks that EAC would assist her to stabilize  SW spoke with pt's staff Susana Valle at [facility] who related that she will call pt to express ACT's support for [facility] placement

## 2021-03-25 NOTE — PROGRESS NOTES
Progress Note - [address] 53 y o  female MRN: [id number]   Unit/Bed#: [facility] 249-01 Encounter: [id number]    Behavior over the last 24 hours: unchanged  Maury Francisco is a 73-year-old female with history of schizoaffective disorder bipolar type who presents for psychiatric follow-up  Staff reports that patient continues to demonstrate erratic behavior with psychotic outbursts and responding to internal stimuli, continuing with persecutory delusions that people are stalking her  On approach, she remains bizarre with rambling, tangential and at times disorganized speech  Although, she appears somewhat less depressed and she is no longer tearful during our conversations  She describes feeling anxious, and she voices concern that her mental health may not improve in the future  She is seeing short-term benefit from the group therapy sessions  She continues to endorse paranoid and persecutory delusions that people are stalking her stating, I constantly live in fear    Denies SI and HI  Auditory hallucinations continue and are unchanged, no visual hallucinations      Sleep: difficulty falling asleep  Appetite: normal  Medication side effects: No   ROS: no complaints, all other systems are negative    Mental Status Evaluation:    Appearance:  age appropriate, casually dressed, adequate grooming   Behavior:  cooperative, calm, bizarre   Speech:  normal rate and volume, tangential, still at times disorganized    Mood:  depressed, anxious   Affect:  more appropriate, less labile, no longer tearful   Thought Process:  perseverative, negative thinking, impaired abstract reasoning, still at times disorganized, still at times tangential   Associations: tangential associations   Thought Content:  persecutory delusions, racing, ruminating thoughts   Perceptual Disturbances: no auditory hallucinations, auditory hallucinations still present, but less frequent   Risk Potential: Suicidal ideation - None at present  Homicidal ideation - None at present  Potential for aggression - No   Sensorium:  oriented to person, place and time/date   Memory:  recent and remote memory grossly intact   Consciousness:  alert and awake   Attention/Concentration: attention span and concentration are age appropriate   Insight:  limited   Judgment: partial   Gait/Station: normal gait/station, normal balance   Motor Activity: no abnormal movements     Vital signs in last 24 hours:    Temp:  [98 °F (36 7 °C)] 98 °F (36 7 °C)  HR:  [89] 89  Resp:  [18] 18  BP: (102)/(68) 102/68    Laboratory results: I have personally reviewed all pertinent laboratory/tests results    Most Recent Labs:   Lab Results   Component Value Date    WBC 9 70 03/16/2021    RBC 4 91 03/16/2021    HGB 14 9 03/16/2021    HCT 45 1 03/16/2021     03/16/2021    RDW 14 1 03/16/2021    NEUTROABS 7 10 (H) 03/16/2021    SODIUM 134 03/17/2021    K 4 0 03/17/2021    CL 99 03/17/2021    CO2 29 03/17/2021    BUN 14 03/17/2021    CREATININE 0 56 (L) 03/17/2021    GLUC 112 (H) 03/17/2021    CALCIUM 9 4 03/17/2021    AST 27 03/17/2021    ALT 18 03/17/2021    ALKPHOS 67 03/17/2021    TP 7 0 03/17/2021    ALB 4 2 03/17/2021    TBILI 0 40 03/17/2021    CHOLESTEROL 220 (H) 08/23/2020    HDL 73 08/23/2020    TRIG 218 (H) 08/23/2020    LDLCALC 103 (H) 08/23/2020    [place] 147 08/23/2020    AMMONIA 28 10/27/2017    IBC0ZPKZOTWU 2 190 10/24/2019    FREET4 0 89 03/24/2016    PREGUR negative 03/16/2021    PREGSERUM Negative 10/21/2019    HCG <2 00 04/10/2014    RPR Non-Reactive 10/25/2018    HGBA1C 5 0 08/06/2019    EAG 97 08/06/2019       Progress Toward Goals: Progress ongoing, continues to demonstrate erratic behavior with psychotic outbursts, frequently requires redirection    Assessment/Plan   Principal Problem:    [facility]ffective [facility], [facility]  Active Problems:    LYNN (generalized anxiety disorder)    Mild intermittent asthma without complication    Tobacco abuse      Recommended Treatment:     Planned medication and treatment changes: All current active medications have been reviewed  Encourage group therapy, milieu therapy and occupational therapy  Behavioral Health checks every 7 minutes    Increase Risperdal to 3 mg b i d  for continued psychotic outbursts  Patient remains an appropriate EAC candidate  She is in favor of this plan as well to continue her long term treatment      Current Facility-Administered Medications   Medication Dose Route Frequency Provider Last Rate    acetaminophen  650 mg Oral Q6H PRN Natacha Gums, DO      acetaminophen  650 mg Oral Q4H PRN Natacha Gums, DO      acetaminophen  975 mg Oral Q6H PRN Natacha Gums, DO      albuterol  2 puff Inhalation Q6H PRN Natacha Gums, DO      haloperidol lactate  2 5 mg Intramuscular Q4H PRN Max 6/day Natacha Gums, DO      And    LORazepam  1 mg Intramuscular Q4H PRN Max 6/day Natacha Gums, DO      And    benztropine  0 5 mg Intramuscular Q4H PRN Max 6/day Natacha Gums, DO      haloperidol lactate  5 mg Intramuscular Q4H PRN Max 4/day Natacha Gums, DO      And    LORazepam  2 mg Intramuscular Q4H PRN Max 4/day Natacha Gums, DO      And    benztropine  1 mg Intramuscular Q4H PRN Max 4/day Natacha Gums, DO      benztropine  1 mg Intramuscular Q4H PRN Max 6/day Natacha Gums, DO      benztropine  1 mg Oral Q4H PRN Max 6/day Natacha Gums, DO      bismuth subsalicylate  758 mg Oral Q6H PRN Rosalind Sharp, DO      clonazePAM  0 5 mg Oral TID JARED Augustin      hydrOXYzine HCL  50 mg Oral Q6H PRN Max 4/day Natacha Gums, DO      Or    diphenhydrAMINE  50 mg Intramuscular Q6H PRN Natacha Gums, DO      diphenhydrAMINE  25 mg Oral HS JARED Jones      escitalopram  10 mg Oral Daily Rena Albright PA-C      gabapentin  600 mg Oral 4x Daily Ras Siddiqui MD      hydrOXYzine HCL  100 mg Oral Q6H PRN Max 4/day Natacha Gums, DO Or    LORazepam  2 mg Intramuscular Q6H PRN Cesario Ree, DO      hydrOXYzine HCL  25 mg Oral Q6H PRN Max 4/day Cesario Ree, DO      lidocaine  1 patch Topical Daily Surendra Flood MD      nicotine  1 patch Transdermal Daily Cesario Ree, [address]      risperiDONE  0 5 mg Oral Q4H PRN Max 6/day Cesario Ree, DO      risperiDONE  1 mg Oral Q4H PRN Max 3/day Cesario Ree, DO      risperiDONE  2 mg Oral Q4H PRN Max 3/day Cesario Ree, DO      risperiDONE  2 mg Oral BID Niurka Salgado PA-C       Risks / Benefits of Treatment:    Risks, benefits, and possible side effects of medications explained to patient and patient verbalizes understanding and agreement for treatment  Counseling / Coordination of Care:    Patient's progress discussed with staff in treatment team meeting  Medications, treatment progress and treatment plan reviewed with patient      Niurka Salgado PA-C 03/25/21

## 2021-03-25 NOTE — PROGRESS NOTES
PRN Zyprexa given for increase agitation at 14:43pm with some effective  Provide tablet to listen to music which is effective

## 2021-03-25 NOTE — PLAN OF CARE
Problem: Potential for Falls  Goal: Patient will remain free of falls  Description: INTERVENTIONS:  - Assess patient frequently for physical needs  -  Identify cognitive and physical deficits and behaviors that affect risk of falls  -  Gravity fall precautions as indicated by assessment   - Educate patient/family on patient safety including physical limitations  - Instruct patient to call for assistance with activity based on assessment  - Modify environment to reduce risk of injury  - Consider OT/PT consult to assist with strengthening/mobility  Outcome: Progressing     Problem: Nutrition/Hydration-ADULT  Goal: Nutrient/Hydration intake appropriate for improving, restoring or maintaining nutritional needs  Description: Monitor and assess patient's nutrition/hydration status for malnutrition  Collaborate with interdisciplinary team and initiate plan and interventions as ordered  Monitor patient's weight and dietary intake as ordered or per policy  Utilize nutrition screening tool and intervene as necessary  Determine patient's food preferences and provide high-protein, high-caloric foods as appropriate       INTERVENTIONS:  - Monitor oral intake, urinary output, labs, and treatment plans  - Assess nutrition and hydration status and recommend course of action  - Evaluate amount of meals eaten  - Assist patient with eating if necessary   - Allow adequate time for meals  - Recommend/ encourage appropriate diets, oral nutritional supplements, and vitamin/mineral supplements  - Order, calculate, and assess calorie counts as needed  - Recommend, monitor, and adjust tube feedings and TPN/PPN based on assessed needs  - Assess need for intravenous fluids  - Provide specific nutrition/hydration education as appropriate  - Include patient/family/caregiver in decisions related to nutrition  Outcome: Progressing     Problem: SUBSTANCE USE/ABUSE  Goal: Will have no detox symptoms and will verbalize plan for changing substance-related behavior  Description: INTERVENTIONS:  - Monitor physical status and assess for symptoms of withdrawal  - Administer medication as ordered  - Provide emotional support with 1 on 1 interaction with staff  - Encourage recovery focused program/ addiction education  - Assess for verbalization of changing behaviors related to substance abuse  - Initiate consults and referrals as appropriate (Case Management, Spiritual Care, etc )  Outcome: Progressing  Goal: By discharge, patient will have ongoing treatment plan addressing chemical dependency  Description: INTERVENTIONS:  - Assist patient with resources and/or appointments for ongoing recovery based living  Outcome: Progressing     Problem: SLEEP DISTURBANCE  Goal: Will exhibit normal sleeping pattern  Description: Interventions:  -  Assess the patients sleep pattern, noting recent changes  - Administer medication as ordered  - Decrease environmental stimuli, including noise, as appropriate during the night  - Encourage the patient to actively participate in unit groups and or exercise during the day to enhance ability to achieve adequate sleep at night  - Assess the patient, in the morning, encouraging a description of sleep experience  Outcome: Progressing     Problem: SELF CARE DEFICIT  Goal: Return ADL status to a safe level of function  Description: INTERVENTIONS:  - Administer medication as ordered  - Assess ADL deficits and provide assistive devices as needed  - Obtain PT/OT consults as needed  - Assist and instruct patient to increase activity and self care as tolerated  Outcome: Progressing     Problem: Alteration in Thoughts and Perception  Goal: Treatment Goal: Gain control of psychotic behaviors/thinking, reduce/eliminate presenting symptoms and demonstrate improved reality functioning upon discharge  Outcome: Progressing  Goal: Verbalize thoughts and feelings  Description: Interventions:  - Promote a nonjudgmental and trusting relationship with the patient through active listening and therapeutic communication  - Assess patient's level of functioning, behavior and potential for risk  - Engage patient in 1 on 1 interactions  - Encourage patient to express fears, feelings, frustrations, and discuss symptoms    - [place] patient to reality, help patient recognize reality-based thinking   - Administer medications as ordered and assess for potential side effects  - Provide the patient education related to the signs and symptoms of the illness and desired effects of prescribed medications  Interventions:  - Assess and re-assess patient's level of risk   - Engage patient in 1:1 interactions, daily, for a minimum of 15 minutes   - Encourage patient to express feelings, fears, frustrations, hopes   Outcome: Progressing  Goal: Refrain from acting on delusional thinking/internal stimuli  Description: Interventions:  - Monitor patient closely, per order   - Utilize least restrictive measures   - Set reasonable limits, give positive feedback for acceptable   - Administer medications as ordered and monitor of potential side effects  Outcome: Progressing  Goal: Agree to be compliant with medication regime, as prescribed and report medication side effects  Description: Interventions:  - Offer appropriate PRN medication and supervise ingestion; conduct AIMS, as needed   Outcome: Progressing  Goal: Attend and participate in unit activities, including therapeutic, recreational, and educational groups  Description: Interventions:  -Encourage Visitation and family involvement in care  Interventions:  - Provide therapeutic and educational activities daily, encourage attendance and participation, and document same in the medical record   Outcome: Progressing  Goal: Recognize dysfunctional thoughts, communicate reality-based thoughts at the time of discharge  Description: Interventions:  - Provide medication and psycho-education to assist patient in compliance and developing insight into his/her illness   Outcome: Progressing  Goal: Complete daily ADLs, including personal hygiene independently, as able  Description: Interventions:  - Observe, teach, and assist patient with ADLS  - Monitor and promote a balance of rest/activity, with adequate nutrition and elimination   Interventions:  - Observe, teach, and assist patient with ADLS  -  Monitor and promote a balance of rest/activity, with adequate nutrition and elimination   Outcome: Progressing     Problem: Ineffective Coping  Goal: Cooperates with admission process  Description: Interventions:   - Complete admission process  Outcome: Progressing  Goal: Identifies ineffective coping skills  Outcome: Progressing  Goal: Identifies healthy coping skills  Outcome: Progressing  Goal: Demonstrates healthy coping skills  Outcome: Progressing  Goal: Participates in unit activities  Description: Interventions:  - Provide therapeutic environment   - Provide required programming   - Redirect inappropriate behaviors   Outcome: Progressing  Goal: Patient/Family participate in treatment and DC plans  Description: Interventions:  - Provide therapeutic environment  Outcome: Progressing  Goal: Patient/Family verbalizes awareness of resources  Outcome: Progressing  Goal: Understands least restrictive measures  Description: Interventions:  - Utilize least restrictive behavior  Outcome: Progressing  Goal: Free from restraint events  Description: - Utilize least restrictive measures   - Provide behavioral interventions   - Redirect inappropriate behaviors   Outcome: Progressing     Problem: Depression  Goal: Verbalize thoughts and feelings  Description: Interventions:  - Promote a nonjudgmental and trusting relationship with the patient through active listening and therapeutic communication  - Assess patient's level of functioning, behavior and potential for risk  - Engage patient in 1 on 1 interactions  - Encourage patient to express fears, feelings, frustrations, and discuss symptoms    - [place] patient to reality, help patient recognize reality-based thinking   - Administer medications as ordered and assess for potential side effects  - Provide the patient education related to the signs and symptoms of the illness and desired effects of prescribed medications  Interventions:  - Assess and re-assess patient's level of risk   - Engage patient in 1:1 interactions, daily, for a minimum of 15 minutes   - Encourage patient to express feelings, fears, frustrations, hopes   Outcome: Progressing  Goal: Attend and participate in unit activities, including therapeutic, recreational, and educational groups  Description: Interventions:  -Encourage Visitation and family involvement in care  Interventions:  - Provide therapeutic and educational activities daily, encourage attendance and participation, and document same in the medical record   Outcome: Progressing  Goal: Complete daily ADLs, including personal hygiene independently, as able  Description: Interventions:  - Observe, teach, and assist patient with ADLS  - Monitor and promote a balance of rest/activity, with adequate nutrition and elimination   Interventions:  - Observe, teach, and assist patient with ADLS  -  Monitor and promote a balance of rest/activity, with adequate nutrition and elimination   Outcome: Progressing  Goal: Treatment Goal: Demonstrate behavioral control of depressive symptoms, verbalize feelings of improved mood/affect, and adopt new coping skills prior to discharge  Outcome: Progressing  Goal: Refrain from harming self  Description: Interventions:  - Monitor patient closely, per order   - Supervise medication ingestion, monitor effects and side effects   Outcome: Progressing  Goal: Refrain from isolation  Description: Interventions:  - Develop a trusting relationship   - Encourage socialization   Outcome: Progressing  Goal: Refrain from self-neglect  Outcome: Progressing     Problem: Anxiety  Goal: Anxiety is at manageable level  Description: Interventions:  - Assess and monitor patient's anxiety level  - Monitor for signs and symptoms (heart palpitations, chest pain, shortness of breath, headaches, nausea, feeling jumpy, restlessness, irritable, apprehensive)  - Collaborate with interdisciplinary team and initiate plan and interventions as ordered    - [place] patient to unit/surroundings  - Explain treatment plan  - Encourage participation in care  - Encourage verbalization of concerns/fears  - Identify coping mechanisms  - Assist in developing anxiety-reducing skills  - Administer/offer alternative therapies  - Limit or eliminate stimulants  Outcome: Progressing     Problem: DISCHARGE PLANNING - CARE MANAGEMENT  Goal: Discharge to post-acute care or home with appropriate resources  Description: INTERVENTIONS:  - Conduct assessment to determine patient/family and health care team treatment goals, and need for post-acute services based on payer coverage, community resources, and patient preferences, and barriers to discharge  - Address psychosocial, clinical, and financial barriers to discharge as identified in assessment in conjunction with the patient/family and health care team  - Arrange appropriate level of post-acute services according to patients   needs and preference and payer coverage in collaboration with the physician and health care team  - Communicate with and update the patient/family, physician, and health care team regarding progress on the discharge plan  - Arrange appropriate transportation to post-acute venues  Outcome: Progressing     Problem: Ineffective Coping  Goal: Participates in unit activities  Description: Interventions:  - Provide therapeutic environment   - Provide required programming   - Redirect inappropriate behaviors   Outcome: Progressing     Problem: SUBSTANCE USE/ABUSE  Goal: By discharge, will develop insight into their chemical dependency and sustain motivation to continue in recovery  Description: INTERVENTIONS:  - Attends all daily group sessions and scheduled AA groups  - Actively practices coping skills through participation in the therapeutic community and adherence to program rules  - Reviews and completes assignments from individual treatment plan  - Assist patient development of understanding of their personal cycle of addiction and relapse triggers  Outcome: Not Progressing

## 2021-03-26 PROCEDURE — 99232 SBSQ HOSP IP/OBS MODERATE 35: CPT | Performed by: PHYSICIAN ASSISTANT

## 2021-03-26 RX ORDER — OLANZAPINE 5 MG/1
5 TABLET, ORALLY DISINTEGRATING ORAL ONCE
Status: COMPLETED | OUTPATIENT
Start: 2021-03-26 | End: 2021-03-26

## 2021-03-26 RX ORDER — OLANZAPINE 5 MG/1
2.5 TABLET, ORALLY DISINTEGRATING ORAL EVERY 8 HOURS PRN
Status: DISCONTINUED | OUTPATIENT
Start: 2021-03-26 | End: 2021-03-27

## 2021-03-26 RX ORDER — OLANZAPINE 5 MG/1
5 TABLET, ORALLY DISINTEGRATING ORAL ONCE
Status: DISCONTINUED | OUTPATIENT
Start: 2021-03-26 | End: 2021-03-26

## 2021-03-26 RX ADMIN — OLANZAPINE 5 MG: 5 TABLET, ORALLY DISINTEGRATING ORAL at 09:36

## 2021-03-26 RX ADMIN — GABAPENTIN 600 MG: 300 CAPSULE ORAL at 08:17

## 2021-03-26 RX ADMIN — GABAPENTIN 600 MG: 300 CAPSULE ORAL at 12:25

## 2021-03-26 RX ADMIN — CLONAZEPAM 0.5 MG: 0.5 TABLET ORAL at 21:04

## 2021-03-26 RX ADMIN — NICOTINE 1 PATCH: 21 PATCH, EXTENDED RELEASE TRANSDERMAL at 08:22

## 2021-03-26 RX ADMIN — CLONAZEPAM 0.5 MG: 0.5 TABLET ORAL at 08:17

## 2021-03-26 RX ADMIN — GABAPENTIN 600 MG: 300 CAPSULE ORAL at 21:03

## 2021-03-26 RX ADMIN — RISPERIDONE 3 MG: 2 TABLET ORAL at 08:17

## 2021-03-26 RX ADMIN — BENZTROPINE MESYLATE 1 MG: 1 TABLET ORAL at 15:07

## 2021-03-26 RX ADMIN — CLONAZEPAM 0.5 MG: 0.5 TABLET ORAL at 12:25

## 2021-03-26 RX ADMIN — LIDOCAINE 1 PATCH: 50 PATCH CUTANEOUS at 08:22

## 2021-03-26 RX ADMIN — DIPHENHYDRAMINE HCL 25 MG: 25 TABLET ORAL at 21:03

## 2021-03-26 RX ADMIN — GABAPENTIN 600 MG: 300 CAPSULE ORAL at 18:02

## 2021-03-26 RX ADMIN — RISPERIDONE 0.5 MG: 0.5 TABLET, ORALLY DISINTEGRATING ORAL at 15:38

## 2021-03-26 RX ADMIN — OLANZAPINE 2.5 MG: 5 TABLET, ORALLY DISINTEGRATING ORAL at 18:02

## 2021-03-26 RX ADMIN — ESCITALOPRAM OXALATE 10 MG: 10 TABLET ORAL at 08:17

## 2021-03-26 RX ADMIN — RISPERIDONE 3 MG: 2 TABLET ORAL at 21:04

## 2021-03-26 NOTE — PROGRESS NOTES
03/26/21 0800   Team Meeting   Meeting Type Daily Rounds   Initial Conference Date 03/26/21   Team Members Present   Team Members Present Physician;Nurse;   Physician Team Member Dr Matti Schilling; Bipin Garcias PA-C   Nursing Team Member Ranulfo Sue RN   Social Work Team Member Tiara Harp   Patient/Family Present   Patient Present No   Patient's Family Present No     CM working on [facility] meeting with [address] [facility] and [address] [facility]  Patient is agreeable to [facility] referral  Still having psychotic outbursts on the unit  Yelling out sporadically  Responding to internal stimuli   Nonsensical

## 2021-03-26 NOTE — PROGRESS NOTES
03/26/21 1000   Activity/Group Checklist   Group   (Leisure Awareness Group)   Attendance Attended   Attendance Duration (min) 31-45   Interactions Interacted appropriately   Affect/Mood Appropriate;Bright   Goals Achieved Identified feelings; Discussed coping strategies; Able to listen to others; Able to engage in interactions; Able to self-disclose; Able to recieve feedback

## 2021-03-26 NOTE — PROGRESS NOTES
Progress Note - [address] 53 y o  female MRN: [id number]   Unit/Bed#: [facility] 249-01 Encounter: [id number]    Behavior over the last 24 hours: karime Meeks is a 41-year-old female with a history of schizoaffective disorder bipolar type who presents for psychiatric follow-up  Patient continues to have psychotic outbursts with yelling and uncontrolled behaviors  Has required several p r n  Antipsychotic medicines to calm down as behavioral strategies have been ineffective to this point  On approach, her affect continues to be wide ranging; she is calm and pleasant at 1st, then grows tearful, then flat and bizarre  She continues to endorse persecutory delusions that people are stalking her and she states, however stalking me keeps forcing me foods I do not want to eat  I am tired of being sick    She endorses passive SI but no plan and denies any true suicidal intent, reiterates I would never do anything because that is against my ayanna, but I am just tired of feeling this way    Currently awaiting EAC referral   Does endorse some limited improvement with the increased dose of Risperdal, specifically stating that she is sleeping better and feels subjectively less irritable      Sleep: improved, still with nightmares though  Appetite: increased  Medication side effects: Yes - increased appetite   ROS: no complaints, all other systems are negative    Mental Status Evaluation:    Appearance:  age appropriate, casually dressed, adequate grooming   Behavior:  cooperative, bizarre   Speech:  tangential, disorganized   Mood:  depressed, dysphoric, anxious, labile   Affect:  increased in range   Thought Process:  disorganized, tangential   Associations: tangential associations   Thought Content:  persecutory delusions, obsessive thoughts, negative thinking, ruminating thoughts   Perceptual Disturbances: no visual hallucinations, auditory hallucinations   Risk Potential: Suicidal ideation - Yes, passive death wish, contracts for safety on the unit, would talk to staff if not feeling safe on the unit, would not feel safe if discharged  Homicidal ideation - None at present  Potential for aggression - No   Sensorium:  oriented to person, place and time/date   Memory:  recent and remote memory grossly intact   Consciousness:  alert and awake   Attention/Concentration: attention span and concentration are age appropriate   Insight:  limited   Judgment: limited   Gait/Station: normal gait/station, normal balance   Motor Activity: no abnormal movements     Vital signs in last 24 hours:    Temp:  [97 °F (36 1 °C)-98 °F (36 7 °C)] 98 °F (36 7 °C)  HR:  [69-91] 91  Resp:  [16-18] 18  BP: (102-105)/(71) 102/71    Laboratory results: I have personally reviewed all pertinent laboratory/tests results    Most Recent Labs:   Lab Results   Component Value Date    WBC 9 70 03/16/2021    RBC 4 91 03/16/2021    HGB 14 9 03/16/2021    HCT 45 1 03/16/2021     03/16/2021    RDW 14 1 03/16/2021    NEUTROABS 7 10 (H) 03/16/2021    SODIUM 134 03/17/2021    K 4 0 03/17/2021    CL 99 03/17/2021    CO2 29 03/17/2021    BUN 14 03/17/2021    CREATININE 0 56 (L) 03/17/2021    GLUC 112 (H) 03/17/2021    CALCIUM 9 4 03/17/2021    AST 27 03/17/2021    ALT 18 03/17/2021    ALKPHOS 67 03/17/2021    TP 7 0 03/17/2021    ALB 4 2 03/17/2021    TBILI 0 40 03/17/2021    CHOLESTEROL 220 (H) 08/23/2020    HDL 73 08/23/2020    TRIG 218 (H) 08/23/2020    LDLCALC 103 (H) 08/23/2020    [place] 147 08/23/2020    AMMONIA 28 10/27/2017    NCL5KGPSYYHS 2 190 10/24/2019    FREET4 0 89 03/24/2016    PREGUR negative 03/16/2021    PREGSERUM Negative 10/21/2019    HCG <2 00 04/10/2014    RPR Non-Reactive 10/25/2018    HGBA1C 5 0 08/06/2019    EAG 97 08/06/2019       Progress Toward Goals: Unchanged, continues to demonstrate erratic behavior with psychotic outbursts, minimal response to appropriate therapy thus far    Assessment/Plan   Principal Problem: Schizoaffective disorder, bipolar type ([facility] [address] )  Active Problems:    LYNN (generalized anxiety disorder)    Mild intermittent asthma without complication    Tobacco abuse      Recommended Treatment:     Planned medication and treatment changes: All current active medications have been reviewed  Encourage group therapy, milieu therapy and occupational therapy  Behavioral Health checks every 7 minutes    Psychotic outbursts are improved with p r n  Zyprexa, new p r n  orders placed  Otherwise, no medication changes    Awaiting EAC referral     Current Facility-Administered Medications   Medication Dose Route Frequency Provider Last Rate    acetaminophen  650 mg Oral Q6H PRN Koki Sena, DO      acetaminophen  650 mg Oral Q4H PRN Koki Sena, DO      acetaminophen  975 mg Oral Q6H PRN Koki Sena, DO      albuterol  2 puff Inhalation Q6H PRN Koki Sena, DO      haloperidol lactate  2 5 mg Intramuscular Q4H PRN Max 6/day Koki Sena, DO      And    LORazepam  1 mg Intramuscular Q4H PRN Max 6/day Koki Sena, DO      And    benztropine  0 5 mg Intramuscular Q4H PRN Max 6/day Koki Sena, DO      haloperidol lactate  5 mg Intramuscular Q4H PRN Max 4/day Koki Sena, DO      And    LORazepam  2 mg Intramuscular Q4H PRN Max 4/day Koki Sena, DO      And    benztropine  1 mg Intramuscular Q4H PRN Max 4/day Koki Sena, DO      benztropine  1 mg Intramuscular Q4H PRN Max 6/day Koki Sena, DO      benztropine  1 mg Oral Q4H PRN Max 6/day Koki Sena, DO      bismuth subsalicylate  975 mg Oral Q6H PRN Jahaira Gonzales, DO      clonazePAM  0 5 mg Oral TID JARED Augustin      hydrOXYzine HCL  50 mg Oral Q6H PRN Max 4/day Koki Sena, DO      Or    diphenhydrAMINE  50 mg Intramuscular Q6H PRN Koki Sena, DO      diphenhydrAMINE  25 mg Oral HS JARED Jones      escitalopram  10 mg Oral Daily Samy Zamora PA-C      gabapentin  600 mg Oral 4x Daily Harold Bence, MD      hydrOXYzine HCL  100 mg Oral Q6H PRN Max 4/day Sweetie Little DO      Or    LORazepam  2 mg Intramuscular Q6H PRN Sweetie Little, DO      hydrOXYzine HCL  25 mg Oral Q6H PRN Max 4/day Sweetie Little, DO      lidocaine  1 patch Topical Daily Darlyn Henry MD      nicotine  1 patch Transdermal Daily Sweetie Little, DO      OLANZapine  2 5 mg Oral Q8H PRN Harold Bence, MD      risperiDONE  0 5 mg Oral Q4H PRN Max 6/day Sweetie Little, DO      risperiDONE  3 mg Oral BID Bhargavi Douglas PA-C       Risks / Benefits of Treatment:    Risks, benefits, and possible side effects of medications explained to patient and patient verbalizes understanding and agreement for treatment  Counseling / Coordination of Care:    Patient's progress discussed with staff in treatment team meeting  Medications, treatment progress and treatment plan reviewed with patient      Bhargavi Douglas PA-C 03/26/21

## 2021-03-26 NOTE — PROGRESS NOTES
Pt found to be resting quietly on most q 7 min checks  No acute behavioral issues noted  Q 7 min checks ongoing

## 2021-03-26 NOTE — PROGRESS NOTES
Patient observed withdrawn and resting in her room for entirety of the evening  Compliant with evening medications  Denies SI/HI  No hallucinations endorsed this evening  No outbursts this evening  No agitation observed  No need identified at this time  Will remain on safety precautions and continual monitoring

## 2021-03-26 NOTE — PROGRESS NOTES
Krishna Davis has had many yelling outbursts today  She is trying to maintain calm but is unable  Zydis 5mg given earlier was ineffective  She declines atarax 100 mg at this time  Remains fixated on her persecutory hallucinations  She paced in room 243 for a while and then said :" Im fine" Now pacing in hallways

## 2021-03-26 NOTE — PROGRESS NOTES
Pt observed yelling and screaming in room  Experiencing active hallucinations  Persecutory thoughts  All alternative coping skills ineffective  Provider aware  New orders to administer 1 x dose Zyprexa zydis 5 mg  Administered as prescribed  Will evaluate effectiveness

## 2021-03-26 NOTE — PROGRESS NOTES
Pt requested the strings be removed from one of her pairs of pants   Strings removed and pants x1 transferred from pt storage to pt room

## 2021-03-26 NOTE — PROGRESS NOTES
Patient continues to be visible on unit  Continues with loud outbursts and pacing in hallways  Denies HI/SI Q 7 min checks continue will continue to monitor

## 2021-03-26 NOTE — PROGRESS NOTES
Additional PT belongings were brought in and gone over by Andra Liu with the PT present    Additional Belongings at bedside:  Sweatshirt  Comb  Underwear:3    Sent to Storage:  Misc  Makeup bag  Misc   Hygiene products  t-shirt:3  Sweat pants  Pants:2  Notebook:2  Hair brush  Sneakers  Black mesh bag

## 2021-03-26 NOTE — PROGRESS NOTES
03/26/21 0730   Activity/Group Checklist   Group   (Goal Planning and Communication )   Attendance Attended   Attendance Duration (min) 46-60   Interactions Interacted appropriately   Affect/Mood Appropriate;Bright;Calm   Goals Achieved Identified feelings; Discussed coping strategies; Able to listen to others; Able to engage in interactions;Verbalized increased hopefulness

## 2021-03-27 PROCEDURE — 99232 SBSQ HOSP IP/OBS MODERATE 35: CPT | Performed by: PSYCHIATRY & NEUROLOGY

## 2021-03-27 RX ORDER — LORAZEPAM 2 MG/ML
2 INJECTION INTRAMUSCULAR EVERY 6 HOURS PRN
Status: DISCONTINUED | OUTPATIENT
Start: 2021-03-27 | End: 2021-03-28

## 2021-03-27 RX ORDER — OLANZAPINE 5 MG/1
5 TABLET, ORALLY DISINTEGRATING ORAL
Status: DISCONTINUED | OUTPATIENT
Start: 2021-03-27 | End: 2021-04-07 | Stop reason: HOSPADM

## 2021-03-27 RX ORDER — RISPERIDONE 1 MG/1
3 TABLET, ORALLY DISINTEGRATING ORAL 2 TIMES DAILY
Status: DISCONTINUED | OUTPATIENT
Start: 2021-03-27 | End: 2021-03-28

## 2021-03-27 RX ORDER — OLANZAPINE 5 MG/1
2.5 TABLET, ORALLY DISINTEGRATING ORAL EVERY 8 HOURS PRN
Status: DISCONTINUED | OUTPATIENT
Start: 2021-03-27 | End: 2021-04-07 | Stop reason: HOSPADM

## 2021-03-27 RX ORDER — OLANZAPINE 10 MG/1
10 INJECTION, POWDER, LYOPHILIZED, FOR SOLUTION INTRAMUSCULAR
Status: DISCONTINUED | OUTPATIENT
Start: 2021-03-27 | End: 2021-04-07 | Stop reason: HOSPADM

## 2021-03-27 RX ORDER — OLANZAPINE 10 MG/1
10 TABLET, ORALLY DISINTEGRATING ORAL
Status: DISCONTINUED | OUTPATIENT
Start: 2021-03-27 | End: 2021-04-07 | Stop reason: HOSPADM

## 2021-03-27 RX ORDER — HALOPERIDOL 5 MG
5 TABLET ORAL EVERY 6 HOURS PRN
Status: DISCONTINUED | OUTPATIENT
Start: 2021-03-27 | End: 2021-04-07 | Stop reason: HOSPADM

## 2021-03-27 RX ADMIN — Medication 524 MG: at 16:08

## 2021-03-27 RX ADMIN — CLONAZEPAM 0.5 MG: 0.5 TABLET ORAL at 07:51

## 2021-03-27 RX ADMIN — GLYCERIN, PETROLATUM, PHENYLEPHRINE HCL, PRAMOXINE HCL: 144; 2.5; 10; 15 CREAM TOPICAL at 16:09

## 2021-03-27 RX ADMIN — LORAZEPAM 1 MG: 2 INJECTION INTRAMUSCULAR; INTRAVENOUS at 03:06

## 2021-03-27 RX ADMIN — DIPHENHYDRAMINE HCL 25 MG: 25 TABLET ORAL at 21:10

## 2021-03-27 RX ADMIN — GABAPENTIN 600 MG: 300 CAPSULE ORAL at 21:10

## 2021-03-27 RX ADMIN — CLONAZEPAM 0.5 MG: 0.5 TABLET ORAL at 12:44

## 2021-03-27 RX ADMIN — ESCITALOPRAM OXALATE 10 MG: 10 TABLET ORAL at 07:51

## 2021-03-27 RX ADMIN — GABAPENTIN 600 MG: 300 CAPSULE ORAL at 07:51

## 2021-03-27 RX ADMIN — BENZTROPINE MESYLATE 1 MG: 1 TABLET ORAL at 15:49

## 2021-03-27 RX ADMIN — LORAZEPAM 2 MG: 2 INJECTION INTRAMUSCULAR; INTRAVENOUS at 20:37

## 2021-03-27 RX ADMIN — LIDOCAINE 1 PATCH: 50 PATCH CUTANEOUS at 07:52

## 2021-03-27 RX ADMIN — HYDROXYZINE HYDROCHLORIDE 25 MG: 25 TABLET, FILM COATED ORAL at 22:04

## 2021-03-27 RX ADMIN — OLANZAPINE 5 MG: 5 TABLET, ORALLY DISINTEGRATING ORAL at 15:49

## 2021-03-27 RX ADMIN — GABAPENTIN 600 MG: 300 CAPSULE ORAL at 17:17

## 2021-03-27 RX ADMIN — GABAPENTIN 600 MG: 300 CAPSULE ORAL at 12:44

## 2021-03-27 RX ADMIN — OLANZAPINE 5 MG: 5 TABLET, ORALLY DISINTEGRATING ORAL at 12:47

## 2021-03-27 RX ADMIN — NICOTINE 1 PATCH: 21 PATCH, EXTENDED RELEASE TRANSDERMAL at 08:02

## 2021-03-27 RX ADMIN — RISPERIDONE 3 MG: 2 TABLET ORAL at 07:51

## 2021-03-27 RX ADMIN — BENZTROPINE MESYLATE 1 MG: 1 TABLET ORAL at 22:37

## 2021-03-27 NOTE — PROGRESS NOTES
Assumed care of this patient at 2300 from previous nurse  Patient observed resting comfortably in their bed without signs/symptoms of distress  Will maintain on safety precautions and continual monitoring

## 2021-03-27 NOTE — PROGRESS NOTES
Progress Note - Behavioral Health   Celia Perdue 53 y o  female MRN: @MRN   Unit/Bed#: [facility] 249-01 Encounter: [id number]       Report from staff regarding this patient received and discussed, and records reviewed prior to seeing this patient   Patient's her continued to be guarded and paranoid but stated she did not want to talk about her thoughts and feelings  Complained of nightmares but decline Minipress stating it lowers her blood pressure too low  Did not want to make any medication adjustment  Because of the patient treatment refractory psychosis and possibility of paranoid related not adherence to her medications, the writer made changes in her risperidone from regular to ODT tablet  He had no change any  dosages of her medications  Sleep: decreased  Appetite: normal    Medication side effects:no complaints    Mental Status Evaluation:    Appearance:  casually dressed   Mood:  anxious   Affect: constricted    Speech:  decreased rate, slow   Thought Content:  paranoid ideation   Perceptual Disturbances: does not appear responding to internal stimuli   Risk Potential: Suicidal ideation - None at present, contracts for safety on the unit  Homicidal ideation - None  Potential for aggression - No   [facility]:  impaired due to psychosis   Judgment: impaired due to psychosis   Motor Activity: no abnormal movements         Laboratory results:  I have personally reviewed all pertinent laboratory results  Assessment and plan: which regular risperidone was changed to risperidone or ODT to improve patient's adherence to medications  ** Please Note: This note has been constructed using a voice recognition system  **      BMP: No results for input(s): NA, K, CL, CO2, BUN in the last 72 hours      Invalid input(s): CREA, GLU  Vitals:    03/27/21 0813   BP: 102/72   Pulse: (!) 108   Resp: 20   Temp: 97 7 °F (36 5 °C)   SpO2: 95%        Medication Administration - last 24 hours from 03/26/2021 1232 to 03/27/2021 1232 Date/Time Order Dose Route Action Action by     03/27/2021 0306 hydrOXYzine HCL (ATARAX) tablet 100 mg   Oral See Alternative Belen Clemente, RN     03/27/2021 0306 LORazepam (ATIVAN) injection 2 mg 1 mg Intramuscular Given Janmary Clemente, RN     03/27/2021 1124 nicotine (NICODERM CQ) 21 mg/24 hr TD 24 hr patch 1 patch 1 patch Transdermal Not Given Hali Kristina, RN     03/27/2021 1326 nicotine (NICODERM CQ) 21 mg/24 hr TD 24 hr patch 1 patch 1 patch Transdermal Not Given Hali Kristina, RN     03/27/2021 0802 nicotine (NICODERM CQ) 21 mg/24 hr TD 24 hr patch 1 patch 1 patch Transdermal Medication Applied Hali Acevedo, RN     03/27/2021 0800 nicotine (NICODERM CQ) 21 mg/24 hr TD 24 hr patch 1 patch 1 patch Transdermal Patch Removed Hali Kristina, RN     03/26/2021 1538 risperiDONE (RisperDAL M-TABS) dispersible tablet 0 5 mg 0 5 mg Oral Given Ania Jazzmine, RN     03/27/2021 0306 LORazepam (ATIVAN) injection 1 mg 1 mg Intramuscular Not Given Janmary Clemente, RN     03/26/2021 1507 benztropine (COGENTIN) tablet 1 mg 1 mg Oral Given Ignacia Garland, RN     03/27/2021 3775 gabapentin (NEURONTIN) capsule 600 mg 600 mg Oral Not Given Hali Kristina, RN     03/27/2021 0751 gabapentin (NEURONTIN) capsule 600 mg 600 mg Oral Given Halifranck Acevedo, RN     03/26/2021 2103 gabapentin (NEURONTIN) capsule 600 mg 600 mg Oral Given Janmary Clemente, RN     03/26/2021 1802 gabapentin (NEURONTIN) capsule 600 mg 600 mg Oral Given Ania Fresh, RN     03/26/2021 2103 diphenhydrAMINE (BENADRYL) tablet 25 mg 25 mg Oral Given Janmary Clemente, RN     03/27/2021 0807 lidocaine (LIDODERM) 5 % patch 1 patch 1 patch Topical Not Given Hali Kristina, RN     03/27/2021 0752 lidocaine (LIDODERM) 5 % patch 1 patch 1 patch Topical Medication Applied Hali Acevedo, RN     03/27/2021 0500 lidocaine (LIDODERM) 5 % patch 1 patch 1 patch Topical Patch Removed Hali Acevedo RN     03/26/2021 2129 lidocaine (LIDODERM) 5 % patch 1 patch 1 patch Topical Patch Removed Lorena Waller RN     03/27/2021 0751 clonazePAM (KlonoPIN) tablet 0 5 mg 0 5 mg Oral Given Lefty Grant RN     03/26/2021 2104 clonazePAM (KlonoPIN) tablet 0 5 mg 0 5 mg Oral Given Lorena Waller RN     03/27/2021 0806 escitalopram (LEXAPRO) tablet 10 mg 10 mg Oral Not Given Lefty Grant RN     03/27/2021 0751 escitalopram (LEXAPRO) tablet 10 mg 10 mg Oral Given Lefty Grant RN     03/27/2021 0809 risperiDONE (RisperDAL) tablet 3 mg 3 mg Oral Not Given Lefty Grant RN     03/27/2021 0751 risperiDONE (RisperDAL) tablet 3 mg 3 mg Oral Given Lefty Grant RN     03/26/2021 2104 risperiDONE (RisperDAL) tablet 3 mg 3 mg Oral Given Lorena Waller RN     03/26/2021 1802 OLANZapine (ZyPREXA ZYDIS) dispersible tablet 2 5 mg 2 5 mg Oral Given Domo Bhat RN

## 2021-03-27 NOTE — PROGRESS NOTES
Patient observed screaming and having an outburst in her room in response to [facility]  Patient unwilling to take PO medications, patient is severely anxious and restless  Agreeable to take half dose of severe anxiety PRN IM  1 mg ativan IM given in left deltoid for jerry anxiety scale of 31  Patient now in bed resting  Will continue to monitor and assess for effectiveness  There are no Wet Read(s) to document. There is 1 Wet Read(s) to document.

## 2021-03-27 NOTE — PROGRESS NOTES
Pt's mood remains labile  Rapid mood shifts  Paranoid ideations  Delusional   Responding to stimuli  Talking to self  Visibly anxious  Reports having hemorrhoids and is requesting Preparation H  [employer] message sent to [facility]  Pt compliant with scheduled medications  Visible on the unit  Walking the halls  Minimal socialization with peers  No acting out or aggressive behaviors  Cooperative with staff  Denies SI/HI  Will continue to monitor and assess

## 2021-03-27 NOTE — PLAN OF CARE
Problem: Potential for Falls  Goal: Patient will remain free of falls  Description: INTERVENTIONS:  - Assess patient frequently for physical needs  -  Identify cognitive and physical deficits and behaviors that affect risk of falls  -  [place] fall precautions as indicated by assessment   - Educate patient/family on patient safety including physical limitations  - Instruct patient to call for assistance with activity based on assessment  - Modify environment to reduce risk of injury  - Consider OT/PT consult to assist with strengthening/mobility  Outcome: Completed     Problem: Nutrition/Hydration-ADULT  Goal: Nutrient/Hydration intake appropriate for improving, restoring or maintaining nutritional needs  Description: Monitor and assess patient's nutrition/hydration status for malnutrition  Collaborate with interdisciplinary team and initiate plan and interventions as ordered  Monitor patient's weight and dietary intake as ordered or per policy  Utilize nutrition screening tool and intervene as necessary  Determine patient's food preferences and provide high-protein, high-caloric foods as appropriate       INTERVENTIONS:  - Monitor oral intake, urinary output, labs, and treatment plans  - Assess nutrition and hydration status and recommend course of action  - Evaluate amount of meals eaten  - Assist patient with eating if necessary   - Allow adequate time for meals  - Recommend/ encourage appropriate diets, oral nutritional supplements, and vitamin/mineral supplements  - Order, calculate, and assess calorie counts as needed  - Recommend, monitor, and adjust tube feedings and TPN/PPN based on assessed needs  - Assess need for intravenous fluids  - Provide specific nutrition/hydration education as appropriate  - Include patient/family/caregiver in decisions related to nutrition  3/27/2021 1034 by Nadia Small RN  Outcome: Progressing  3/27/2021 1032 by Nadia Small RN  Outcome: Progressing     Problem: SUBSTANCE USE/ABUSE  Goal: Will have no detox symptoms and will verbalize plan for changing substance-related behavior  Description: INTERVENTIONS:  - Monitor physical status and assess for symptoms of withdrawal  - Administer medication as ordered  - Provide emotional support with 1 on 1 interaction with staff  - Encourage recovery focused program/ addiction education  - Assess for verbalization of changing behaviors related to substance abuse  - Initiate consults and referrals as appropriate (Case Management, Spiritual Care, etc )  3/27/2021 1034 by Kendra Thompson RN  Outcome: Progressing  3/27/2021 1032 by Kendra Thompson RN  Outcome: Progressing  Goal: By discharge, will develop insight into their chemical dependency and sustain motivation to continue in recovery  Description: INTERVENTIONS:  - Attends all daily group sessions and scheduled AA groups  - Actively practices coping skills through participation in the therapeutic community and adherence to program rules  - Reviews and completes assignments from individual treatment plan  - Assist patient development of understanding of their personal cycle of addiction and relapse triggers  3/27/2021 1034 by Kendra Thompson RN  Outcome: Progressing  3/27/2021 1032 by Kendra Thompson RN  Outcome: Progressing  Goal: By discharge, patient will have ongoing treatment plan addressing chemical dependency  Description: INTERVENTIONS:  - Assist patient with resources and/or appointments for ongoing recovery based living  3/27/2021 1034 by Kendra Thompson RN  Outcome: Progressing  3/27/2021 1032 by Kendra Thompson RN  Outcome: Progressing     Problem: SLEEP DISTURBANCE  Goal: Will exhibit normal sleeping pattern  Description: Interventions:  -  Assess the patients sleep pattern, noting recent changes  - Administer medication as ordered  - Decrease environmental stimuli, including noise, as appropriate during the night  - Encourage the patient to actively participate in unit groups and or exercise during the day to enhance ability to achieve adequate sleep at night  - Assess the patient, in the morning, encouraging a description of sleep experience  3/27/2021 1034 by Nakia Albert RN  Outcome: Progressing  3/27/2021 1032 by Nakia Albert RN  Outcome: Progressing     Problem: SELF CARE DEFICIT  Goal: Return ADL status to a safe level of function  Description: INTERVENTIONS:  - Administer medication as ordered  - Assess ADL deficits and provide assistive devices as needed  - Obtain PT/OT consults as needed  - Assist and instruct patient to increase activity and self care as tolerated  3/27/2021 1034 by Nakia Albert RN  Outcome: Progressing  3/27/2021 1032 by Nakia Albert RN  Outcome: Progressing     Problem: Alteration in Thoughts and Perception  Goal: Refrain from acting on delusional thinking/internal stimuli  Description: Interventions:  - Monitor patient closely, per order   - Utilize least restrictive measures   - Set reasonable limits, give positive feedback for acceptable   - Administer medications as ordered and monitor of potential side effects  3/27/2021 1034 by Nakia Albert RN  Outcome: Not Progressing  3/27/2021 1032 by Nakia Albert RN  Outcome: Not Progressing  Goal: Agree to be compliant with medication regime, as prescribed and report medication side effects  Description: Interventions:  - Offer appropriate PRN medication and supervise ingestion; conduct AIMS, as needed   3/27/2021 1034 by Nakia Albert RN  Outcome: Progressing  3/27/2021 1032 by Nakia Albert RN  Outcome: Progressing  Goal: Recognize dysfunctional thoughts, communicate reality-based thoughts at the time of discharge  Description: Interventions:  - Provide medication and psycho-education to assist patient in compliance and developing insight into his/her illness   3/27/2021 1034 by Nakia Albert RN  Outcome: Progressing  3/27/2021 1032 by Nakia Albert RN  Outcome: Progressing     Problem: Ineffective Coping  Goal: Cooperates with admission process  Description: Interventions:   - Complete admission process  Outcome: Completed  Goal: Identifies ineffective coping skills  3/27/2021 1034 by Lina Hammond RN  Outcome: Progressing  3/27/2021 1032 by Lina Hammond RN  Outcome: Progressing  Goal: Identifies healthy coping skills  3/27/2021 1034 by Lina Hammond RN  Outcome: Progressing  3/27/2021 1032 by Lina Hammond RN  Outcome: Progressing  Goal: Demonstrates healthy coping skills  3/27/2021 1034 by Lina Hammond RN  Outcome: Progressing  3/27/2021 1032 by Lina Hammond RN  Outcome: Progressing  Goal: Participates in unit activities  Description: Interventions:  - Provide therapeutic environment   - Provide required programming   - Redirect inappropriate behaviors   3/27/2021 1034 by Lina Hammond RN  Outcome: Completed  3/27/2021 1032 by Lina Hammond RN  Outcome: Progressing  Goal: Patient/Family participate in treatment and DC plans  Description: Interventions:  - Provide therapeutic environment  3/27/2021 1034 by Lina Hammond RN  Outcome: Progressing  3/27/2021 1032 by Lina Hammond RN  Outcome: Progressing  Goal: Patient/Family verbalizes awareness of resources  3/27/2021 1034 by Lina Hammond RN  Outcome: Progressing  3/27/2021 1032 by Lina Hammond RN  Outcome: Progressing  Goal: Understands least restrictive measures  Description: Interventions:  - Utilize least restrictive behavior  3/27/2021 1034 by Lina Hammond RN  Outcome: Progressing  3/27/2021 1032 by Lina Hammond RN  Outcome: Progressing  Goal: Free from restraint events  Description: - Utilize least restrictive measures   - Provide behavioral interventions   - Redirect inappropriate behaviors   3/27/2021 1034 by Lina Hammond RN  Outcome: Progressing  3/27/2021 1032 by Lina Hammond RN  Outcome: Progressing     Problem: Depression  Goal: Refrain from harming self  Description: Interventions:  - Monitor patient closely, per order   - Supervise medication ingestion, monitor effects and side effects   3/27/2021 1034 by Li Jo RN  Outcome: Progressing  3/27/2021 1032 by Li Jo RN  Outcome: Progressing  Goal: Refrain from isolation  Description: Interventions:  - Develop a trusting relationship   - Encourage socialization   3/27/2021 1034 by Li Jo RN  Outcome: Progressing  3/27/2021 1032 by Li Jo RN  Outcome: Progressing     Problem: Anxiety  Goal: Anxiety is at manageable level  Description: Interventions:  - Assess and monitor patient's anxiety level  - Monitor for signs and symptoms (heart palpitations, chest pain, shortness of breath, headaches, nausea, feeling jumpy, restlessness, irritable, apprehensive)  - Collaborate with interdisciplinary team and initiate plan and interventions as ordered    - [place] patient to unit/surroundings  - Explain treatment plan  - Encourage participation in care  - Encourage verbalization of concerns/fears  - Identify coping mechanisms  - Assist in developing anxiety-reducing skills  - Administer/offer alternative therapies  - Limit or eliminate stimulants  3/27/2021 1034 by Li Jo RN  Outcome: Progressing  3/27/2021 1032 by Li Jo RN  Outcome: Progressing     Problem: Ineffective Coping  Goal: Participates in unit activities  Description: Interventions:  - Provide therapeutic environment   - Provide required programming   - Redirect inappropriate behaviors   Outcome: Progressing     Problem: DISCHARGE PLANNING - CARE MANAGEMENT  Goal: Discharge to post-acute care or home with appropriate resources  Description: INTERVENTIONS:  - Conduct assessment to determine patient/family and health care team treatment goals, and need for post-acute services based on payer coverage, community resources, and patient preferences, and barriers to discharge  - Address psychosocial, clinical, and financial barriers to discharge as identified in assessment in conjunction with the patient/family and health care team  - Arrange appropriate level of post-acute services according to patients   needs and preference and payer coverage in collaboration with the physician and health care team  - Communicate with and update the patient/family, physician, and health care team regarding progress on the discharge plan  - Arrange appropriate transportation to post-acute venues  3/27/2021 1034 by Aditya Haider RN  Outcome: Progressing  3/27/2021 1032 by Aditya Haider RN  Outcome: Progressing

## 2021-03-27 NOTE — PROGRESS NOTES
Patient observed asleep or resting for the rest of the night without issue/concern  Non-labored breathing noted  No signs or symptoms of distress noted  Will remain on safety precautions and continual monitoring

## 2021-03-28 PROCEDURE — 99232 SBSQ HOSP IP/OBS MODERATE 35: CPT | Performed by: PSYCHIATRY & NEUROLOGY

## 2021-03-28 RX ORDER — RISPERIDONE 1 MG/1
3 TABLET, ORALLY DISINTEGRATING ORAL DAILY
Status: DISCONTINUED | OUTPATIENT
Start: 2021-03-29 | End: 2021-03-29

## 2021-03-28 RX ORDER — RISPERIDONE 1 MG/1
4 TABLET, ORALLY DISINTEGRATING ORAL
Status: DISCONTINUED | OUTPATIENT
Start: 2021-03-29 | End: 2021-03-29

## 2021-03-28 RX ORDER — LORAZEPAM 2 MG/ML
2 INJECTION INTRAMUSCULAR EVERY 8 HOURS PRN
Status: DISCONTINUED | OUTPATIENT
Start: 2021-03-28 | End: 2021-04-07 | Stop reason: HOSPADM

## 2021-03-28 RX ORDER — LORAZEPAM 1 MG/1
1 TABLET ORAL EVERY 8 HOURS PRN
Status: DISCONTINUED | OUTPATIENT
Start: 2021-03-28 | End: 2021-04-07 | Stop reason: HOSPADM

## 2021-03-28 RX ORDER — OLANZAPINE 10 MG/1
5 INJECTION, POWDER, LYOPHILIZED, FOR SOLUTION INTRAMUSCULAR
Status: DISCONTINUED | OUTPATIENT
Start: 2021-03-28 | End: 2021-04-07 | Stop reason: HOSPADM

## 2021-03-28 RX ADMIN — ALBUTEROL SULFATE 2 PUFF: 90 AEROSOL, METERED RESPIRATORY (INHALATION) at 07:07

## 2021-03-28 RX ADMIN — CLONAZEPAM 0.5 MG: 0.5 TABLET ORAL at 21:12

## 2021-03-28 RX ADMIN — OLANZAPINE 10 MG: 10 TABLET, ORALLY DISINTEGRATING ORAL at 15:22

## 2021-03-28 RX ADMIN — CLONAZEPAM 0.5 MG: 0.5 TABLET ORAL at 08:02

## 2021-03-28 RX ADMIN — LORAZEPAM 2 MG: 2 INJECTION INTRAMUSCULAR; INTRAVENOUS at 09:58

## 2021-03-28 RX ADMIN — RISPERIDONE 3 MG: 1 TABLET, ORALLY DISINTEGRATING ORAL at 21:09

## 2021-03-28 RX ADMIN — GLYCERIN, PETROLATUM, PHENYLEPHRINE HCL, PRAMOXINE HCL: 144; 2.5; 10; 15 CREAM TOPICAL at 16:18

## 2021-03-28 RX ADMIN — DIPHENHYDRAMINE HCL 25 MG: 25 TABLET ORAL at 21:11

## 2021-03-28 RX ADMIN — GLYCERIN, PETROLATUM, PHENYLEPHRINE HCL, PRAMOXINE HCL 1 APPLICATION: 144; 2.5; 10; 15 CREAM TOPICAL at 07:08

## 2021-03-28 RX ADMIN — GABAPENTIN 600 MG: 300 CAPSULE ORAL at 18:05

## 2021-03-28 RX ADMIN — LIDOCAINE 1 PATCH: 50 PATCH CUTANEOUS at 07:58

## 2021-03-28 RX ADMIN — NICOTINE 1 PATCH: 21 PATCH, EXTENDED RELEASE TRANSDERMAL at 07:58

## 2021-03-28 RX ADMIN — RISPERIDONE 3 MG: 1 TABLET, ORALLY DISINTEGRATING ORAL at 08:02

## 2021-03-28 RX ADMIN — CLONAZEPAM 0.5 MG: 0.5 TABLET ORAL at 13:11

## 2021-03-28 RX ADMIN — ESCITALOPRAM OXALATE 10 MG: 10 TABLET ORAL at 08:02

## 2021-03-28 RX ADMIN — GABAPENTIN 600 MG: 300 CAPSULE ORAL at 21:09

## 2021-03-28 RX ADMIN — GABAPENTIN 600 MG: 300 CAPSULE ORAL at 13:05

## 2021-03-28 RX ADMIN — GABAPENTIN 600 MG: 300 CAPSULE ORAL at 08:03

## 2021-03-28 NOTE — PROGRESS NOTES
Previously administered ativan IM was moderately effective  Pt using meditation application on tablet as coping skill and jornaling  Mood shifts rapidly through out shift  Pt has periods of extreme agitation anger and anxiety patient screaming "I want to press charges against they people that are hypnotizing me and keep doing these things to me" Spoke at length with patient  Pt verbalizes "my brain is restless" Mood unstable  Pt expresses that she needs to go to [facility] and that things are not improving this time like in the past  Active hallucinations  Persecutory thoughts  Administered Zyprexa 10 mg OD prn when all alternate interventions ineffective  Will continue to monitor

## 2021-03-28 NOTE — PROGRESS NOTES
During shift change pt became increasingly agitated and was pacing hallways yelling  Actively responding to internal/external stimulation  All alternative coping skills ineffective  Administered scheduled medications at 0800 which included risperidone 3 mg and klonopin 0 5 mg  However, patient continued to escalate and was unable to relax  Anxious and agitated  Paranoid  Persecutory thoughts  Pt went to room 243 in attempt to decrease stimulation  Administered 2 mg ativan IM in right deltoid  Pt tolerated without complication  Will evaluate effectiveness of medication

## 2021-03-28 NOTE — PROGRESS NOTES
Patient initially had difficulty relaxing to the point that she would be able to sleep  She did fall asleep before midnight and has been sleeping without interruption since    Monitoring continues

## 2021-03-28 NOTE — PROGRESS NOTES
At start of shift patient was extremely agitated  Screaming and yelling in her room  Refused all po medications  Hallucinating  Unwilling to talk with this writer  Slamming things in her room  Contacted [employer] for order for IM ativan as this has been effective with patient in the past   Orders received  IM ativan given at 2037  Patient agreeable to ativan injection but still screaming and yelling the whole time  Even after receiving ativan patient remained restless, agitated, paranoid, and anxious for quite some time  Impatient for HS medications  HS klonopin held due to ativan injection  Risperdal not stocked by pharmacy at the time patient received her meds  Advised patient it would be brought to her as soon as it was available  At 2200 patient came out to nurses' station and stated she was still feeling anxious and could not relax  Prn atarax given  Reassured patient Risperdal would be brought to her soon  Patient came out again requesting cogentin at 2230 for restless legs  Prn cogentin provided  Patient then went to her room and was able to relax and fell asleep  When Risperdal became available and was brought to her she was asleep and would not wake up  Risperdal dose not given

## 2021-03-28 NOTE — PLAN OF CARE
Problem: Nutrition/Hydration-ADULT  Goal: Nutrient/Hydration intake appropriate for improving, restoring or maintaining nutritional needs  Description: Monitor and assess patient's nutrition/hydration status for malnutrition  Collaborate with interdisciplinary team and initiate plan and interventions as ordered  Monitor patient's weight and dietary intake as ordered or per policy  Utilize nutrition screening tool and intervene as necessary  Determine patient's food preferences and provide high-protein, high-caloric foods as appropriate       INTERVENTIONS:  - Monitor oral intake, urinary output, labs, and treatment plans  - Assess nutrition and hydration status and recommend course of action  - Evaluate amount of meals eaten  - Assist patient with eating if necessary   - Allow adequate time for meals  - Recommend/ encourage appropriate diets, oral nutritional supplements, and vitamin/mineral supplements  - Order, calculate, and assess calorie counts as needed  - Recommend, monitor, and adjust tube feedings and TPN/PPN based on assessed needs  - Assess need for intravenous fluids  - Provide specific nutrition/hydration education as appropriate  - Include patient/family/caregiver in decisions related to nutrition  Outcome: Progressing     Problem: SUBSTANCE USE/ABUSE  Goal: Will have no detox symptoms and will verbalize plan for changing substance-related behavior  Description: INTERVENTIONS:  - Monitor physical status and assess for symptoms of withdrawal  - Administer medication as ordered  - Provide emotional support with 1 on 1 interaction with staff  - Encourage recovery focused program/ addiction education  - Assess for verbalization of changing behaviors related to substance abuse  - Initiate consults and referrals as appropriate (Case Management, Spiritual Care, etc )  Outcome: Progressing  Goal: By discharge, will develop insight into their chemical dependency and sustain motivation to continue in recovery  Description: INTERVENTIONS:  - Attends all daily group sessions and scheduled AA groups  - Actively practices coping skills through participation in the therapeutic community and adherence to program rules  - Reviews and completes assignments from individual treatment plan  - Assist patient development of understanding of their personal cycle of addiction and relapse triggers  Outcome: Progressing  Goal: By discharge, patient will have ongoing treatment plan addressing chemical dependency  Description: INTERVENTIONS:  - Assist patient with resources and/or appointments for ongoing recovery based living  Outcome: Progressing     Problem: SLEEP DISTURBANCE  Goal: Will exhibit normal sleeping pattern  Description: Interventions:  -  Assess the patients sleep pattern, noting recent changes  - Administer medication as ordered  - Decrease environmental stimuli, including noise, as appropriate during the night  - Encourage the patient to actively participate in unit groups and or exercise during the day to enhance ability to achieve adequate sleep at night  - Assess the patient, in the morning, encouraging a description of sleep experience  Outcome: Progressing     Problem: SELF CARE DEFICIT  Goal: Return ADL status to a safe level of function  Description: INTERVENTIONS:  - Administer medication as ordered  - Assess ADL deficits and provide assistive devices as needed  - Obtain PT/OT consults as needed  - Assist and instruct patient to increase activity and self care as tolerated  Outcome: Progressing     Problem: Alteration in Thoughts and Perception  Goal: Treatment Goal: Gain control of psychotic behaviors/thinking, reduce/eliminate presenting symptoms and demonstrate improved reality functioning upon discharge  Outcome: Progressing  Goal: Refrain from acting on delusional thinking/internal stimuli  Description: Interventions:  - Monitor patient closely, per order   - Utilize least restrictive measures   - Set reasonable limits, give positive feedback for acceptable   - Administer medications as ordered and monitor of potential side effects  Outcome: Not Progressing  Goal: Agree to be compliant with medication regime, as prescribed and report medication side effects  Description: Interventions:  - Offer appropriate PRN medication and supervise ingestion; conduct AIMS, as needed   Outcome: Progressing  Goal: Recognize dysfunctional thoughts, communicate reality-based thoughts at the time of discharge  Description: Interventions:  - Provide medication and psycho-education to assist patient in compliance and developing insight into his/her illness   Outcome: Progressing     Problem: Ineffective Coping  Goal: Identifies ineffective coping skills  Outcome: Progressing  Goal: Identifies healthy coping skills  Outcome: Progressing  Goal: Demonstrates healthy coping skills  Outcome: Progressing  Goal: Patient/Family verbalizes awareness of resources  Outcome: Progressing  Goal: Understands least restrictive measures  Description: Interventions:  - Utilize least restrictive behavior  Outcome: Progressing  Goal: Free from restraint events  Description: - Utilize least restrictive measures   - Provide behavioral interventions   - Redirect inappropriate behaviors   Outcome: Progressing     Problem: Depression  Goal: Treatment Goal: Demonstrate behavioral control of depressive symptoms, verbalize feelings of improved mood/affect, and adopt new coping skills prior to discharge  Outcome: Progressing  Goal: Refrain from harming self  Description: Interventions:  - Monitor patient closely, per order   - Supervise medication ingestion, monitor effects and side effects   Outcome: Progressing  Goal: Refrain from isolation  Description: Interventions:  - Develop a trusting relationship   - Encourage socialization   Outcome: Progressing  Goal: Refrain from self-neglect  Outcome: Progressing     Problem: Anxiety  Goal: Anxiety is at manageable level  Description: Interventions:  - Assess and monitor patient's anxiety level  - Monitor for signs and symptoms (heart palpitations, chest pain, shortness of breath, headaches, nausea, feeling jumpy, restlessness, irritable, apprehensive)  - Collaborate with interdisciplinary team and initiate plan and interventions as ordered    - [place] patient to unit/surroundings  - Explain treatment plan  - Encourage participation in care  - Encourage verbalization of concerns/fears  - Identify coping mechanisms  - Assist in developing anxiety-reducing skills  - Administer/offer alternative therapies  - Limit or eliminate stimulants  Outcome: Progressing

## 2021-03-29 PROCEDURE — 99232 SBSQ HOSP IP/OBS MODERATE 35: CPT | Performed by: PHYSICIAN ASSISTANT

## 2021-03-29 RX ORDER — RISPERIDONE 2 MG/1
4 TABLET, FILM COATED ORAL
Status: DISCONTINUED | OUTPATIENT
Start: 2021-03-29 | End: 2021-04-07 | Stop reason: HOSPADM

## 2021-03-29 RX ADMIN — BENZTROPINE MESYLATE 1 MG: 1 TABLET ORAL at 04:19

## 2021-03-29 RX ADMIN — GABAPENTIN 600 MG: 300 CAPSULE ORAL at 08:04

## 2021-03-29 RX ADMIN — DIPHENHYDRAMINE HCL 25 MG: 25 TABLET ORAL at 22:11

## 2021-03-29 RX ADMIN — ALBUTEROL SULFATE 2 PUFF: 90 AEROSOL, METERED RESPIRATORY (INHALATION) at 11:28

## 2021-03-29 RX ADMIN — BENZTROPINE MESYLATE 1 MG: 1 TABLET ORAL at 13:47

## 2021-03-29 RX ADMIN — GABAPENTIN 600 MG: 300 CAPSULE ORAL at 17:01

## 2021-03-29 RX ADMIN — RISPERIDONE 3 MG: 1 TABLET, ORALLY DISINTEGRATING ORAL at 08:04

## 2021-03-29 RX ADMIN — GABAPENTIN 600 MG: 300 CAPSULE ORAL at 22:11

## 2021-03-29 RX ADMIN — MAGNESIUM HYDROXIDE 30 ML: 400 SUSPENSION ORAL at 13:42

## 2021-03-29 RX ADMIN — CLONAZEPAM 0.5 MG: 0.5 TABLET ORAL at 08:04

## 2021-03-29 RX ADMIN — GABAPENTIN 600 MG: 300 CAPSULE ORAL at 11:51

## 2021-03-29 RX ADMIN — LORAZEPAM 1 MG: 1 TABLET ORAL at 16:17

## 2021-03-29 RX ADMIN — Medication 524 MG: at 10:18

## 2021-03-29 RX ADMIN — OLANZAPINE 5 MG: 5 TABLET, ORALLY DISINTEGRATING ORAL at 13:57

## 2021-03-29 RX ADMIN — OLANZAPINE 10 MG: 10 TABLET, ORALLY DISINTEGRATING ORAL at 09:37

## 2021-03-29 RX ADMIN — LORAZEPAM 1 MG: 1 TABLET ORAL at 02:14

## 2021-03-29 RX ADMIN — RISPERIDONE 4 MG: 2 TABLET ORAL at 22:11

## 2021-03-29 RX ADMIN — CLONAZEPAM 0.5 MG: 0.5 TABLET ORAL at 22:11

## 2021-03-29 RX ADMIN — LIDOCAINE 1 PATCH: 50 PATCH CUTANEOUS at 04:20

## 2021-03-29 RX ADMIN — Medication 524 MG: at 17:01

## 2021-03-29 RX ADMIN — ESCITALOPRAM OXALATE 10 MG: 10 TABLET ORAL at 08:04

## 2021-03-29 RX ADMIN — CLONAZEPAM 0.5 MG: 0.5 TABLET ORAL at 11:51

## 2021-03-29 RX ADMIN — OLANZAPINE 10 MG: 10 TABLET, ORALLY DISINTEGRATING ORAL at 02:14

## 2021-03-29 RX ADMIN — NICOTINE 1 PATCH: 21 PATCH, EXTENDED RELEASE TRANSDERMAL at 04:21

## 2021-03-29 NOTE — PROGRESS NOTES
03/29/21 0730   Activity/Group Checklist   Group   (Goal Planning and Communication )   Attendance Attended   Attendance Duration (min) 46-60   Interactions Interacted appropriately   Affect/Mood Appropriate   Goals Achieved Identified feelings; Discussed coping strategies; Able to listen to others; Able to engage in interactions

## 2021-03-29 NOTE — PROGRESS NOTES
Patient spent the majority of the evening withdrawn to her room and sleeping  Declined evening snack     Cooperative when woken for HS medications    Stated she was "fine" and did not need anything additional

## 2021-03-29 NOTE — PROGRESS NOTES
03/29/21 1330   Activity/Group Checklist   Group   (Meditation and Art Response)   Attendance Did not attend  (Group offered pt declined)

## 2021-03-29 NOTE — PROGRESS NOTES
Pt remains extremely labile  Yelling, cursing, and screaming at staff  Delusional  Paranoid  Disorganized  Tangential   Responding to stimuli and c/o AH  PRN's do not seem to be effective  Pt was given PRN Ativan 1 mg @ 7141

## 2021-03-29 NOTE — PLAN OF CARE
Problem: Nutrition/Hydration-ADULT  Goal: Nutrient/Hydration intake appropriate for improving, restoring or maintaining nutritional needs  Description: Monitor and assess patient's nutrition/hydration status for malnutrition  Collaborate with interdisciplinary team and initiate plan and interventions as ordered  Monitor patient's weight and dietary intake as ordered or per policy  Utilize nutrition screening tool and intervene as necessary  Determine patient's food preferences and provide high-protein, high-caloric foods as appropriate       INTERVENTIONS:  - Monitor oral intake, urinary output, labs, and treatment plans  - Assess nutrition and hydration status and recommend course of action  - Evaluate amount of meals eaten  - Assist patient with eating if necessary   - Allow adequate time for meals  - Recommend/ encourage appropriate diets, oral nutritional supplements, and vitamin/mineral supplements  - Order, calculate, and assess calorie counts as needed  - Recommend, monitor, and adjust tube feedings and TPN/PPN based on assessed needs  - Assess need for intravenous fluids  - Provide specific nutrition/hydration education as appropriate  - Include patient/family/caregiver in decisions related to nutrition  Outcome: Progressing     Problem: SUBSTANCE USE/ABUSE  Goal: Will have no detox symptoms and will verbalize plan for changing substance-related behavior  Description: INTERVENTIONS:  - Monitor physical status and assess for symptoms of withdrawal  - Administer medication as ordered  - Provide emotional support with 1 on 1 interaction with staff  - Encourage recovery focused program/ addiction education  - Assess for verbalization of changing behaviors related to substance abuse  - Initiate consults and referrals as appropriate (Case Management, Spiritual Care, etc )  Outcome: Progressing  Goal: By discharge, will develop insight into their chemical dependency and sustain motivation to continue in recovery  Description: INTERVENTIONS:  - Attends all daily group sessions and scheduled AA groups  - Actively practices coping skills through participation in the therapeutic community and adherence to program rules  - Reviews and completes assignments from individual treatment plan  - Assist patient development of understanding of their personal cycle of addiction and relapse triggers  Outcome: Progressing  Goal: By discharge, patient will have ongoing treatment plan addressing chemical dependency  Description: INTERVENTIONS:  - Assist patient with resources and/or appointments for ongoing recovery based living  Outcome: Progressing     Problem: SLEEP DISTURBANCE  Goal: Will exhibit normal sleeping pattern  Description: Interventions:  -  Assess the patients sleep pattern, noting recent changes  - Administer medication as ordered  - Decrease environmental stimuli, including noise, as appropriate during the night  - Encourage the patient to actively participate in unit groups and or exercise during the day to enhance ability to achieve adequate sleep at night  - Assess the patient, in the morning, encouraging a description of sleep experience  Outcome: Progressing     Problem: SELF CARE DEFICIT  Goal: Return ADL status to a safe level of function  Description: INTERVENTIONS:  - Administer medication as ordered  - Assess ADL deficits and provide assistive devices as needed  - Obtain PT/OT consults as needed  - Assist and instruct patient to increase activity and self care as tolerated  Outcome: Progressing     Problem: Alteration in Thoughts and Perception  Goal: Treatment Goal: Gain control of psychotic behaviors/thinking, reduce/eliminate presenting symptoms and demonstrate improved reality functioning upon discharge  Outcome: Progressing  Goal: Refrain from acting on delusional thinking/internal stimuli  Description: Interventions:  - Monitor patient closely, per order   - Utilize least restrictive measures   - Set reasonable limits, give positive feedback for acceptable   - Administer medications as ordered and monitor of potential side effects  Outcome: Progressing  Goal: Agree to be compliant with medication regime, as prescribed and report medication side effects  Description: Interventions:  - Offer appropriate PRN medication and supervise ingestion; conduct AIMS, as needed   Outcome: Progressing  Goal: Recognize dysfunctional thoughts, communicate reality-based thoughts at the time of discharge  Description: Interventions:  - Provide medication and psycho-education to assist patient in compliance and developing insight into his/her illness   Outcome: Progressing     Problem: Ineffective Coping  Goal: Identifies ineffective coping skills  Outcome: Progressing  Goal: Identifies healthy coping skills  Outcome: Progressing  Goal: Demonstrates healthy coping skills  Outcome: Progressing  Goal: Patient/Family participate in treatment and DC plans  Description: Interventions:  - Provide therapeutic environment  Outcome: Progressing  Goal: Patient/Family verbalizes awareness of resources  Outcome: Progressing  Goal: Understands least restrictive measures  Description: Interventions:  - Utilize least restrictive behavior  Outcome: Progressing  Goal: Free from restraint events  Description: - Utilize least restrictive measures   - Provide behavioral interventions   - Redirect inappropriate behaviors   Outcome: Progressing     Problem: Depression  Goal: Treatment Goal: Demonstrate behavioral control of depressive symptoms, verbalize feelings of improved mood/affect, and adopt new coping skills prior to discharge  Outcome: Progressing  Goal: Refrain from harming self  Description: Interventions:  - Monitor patient closely, per order   - Supervise medication ingestion, monitor effects and side effects   Outcome: Progressing  Goal: Refrain from isolation  Description: Interventions:  - Develop a trusting relationship   - Encourage socialization   Outcome: Progressing  Goal: Refrain from self-neglect  Outcome: Progressing     Problem: DISCHARGE PLANNING - CARE MANAGEMENT  Goal: Discharge to post-acute care or home with appropriate resources  Description: INTERVENTIONS:  - Conduct assessment to determine patient/family and health care team treatment goals, and need for post-acute services based on payer coverage, community resources, and patient preferences, and barriers to discharge  - Address psychosocial, clinical, and financial barriers to discharge as identified in assessment in conjunction with the patient/family and health care team  - Arrange appropriate level of post-acute services according to patients   needs and preference and payer coverage in collaboration with the physician and health care team  - Communicate with and update the patient/family, physician, and health care team regarding progress on the discharge plan  - Arrange appropriate transportation to post-acute venues  Outcome: Progressing     Problem: Ineffective Coping  Goal: Participates in unit activities  Description: Interventions:  - Provide therapeutic environment   - Provide required programming   - Redirect inappropriate behaviors   Outcome: Progressing     Problem: Anxiety  Goal: Anxiety is at manageable level  Description: Interventions:  - Assess and monitor patient's anxiety level  - Monitor for signs and symptoms (heart palpitations, chest pain, shortness of breath, headaches, nausea, feeling jumpy, restlessness, irritable, apprehensive)  - Collaborate with interdisciplinary team and initiate plan and interventions as ordered    - [place] patient to unit/surroundings  - Explain treatment plan  - Encourage participation in care  - Encourage verbalization of concerns/fears  - Identify coping mechanisms  - Assist in developing anxiety-reducing skills  - Administer/offer alternative therapies  - Limit or eliminate stimulants  Outcome: Not Progressing

## 2021-03-29 NOTE — PROGRESS NOTES
Progress Note - [address] 53 y o  female MRN: [id number]   Unit/Bed#: [facility] 249-01 Encounter: [id number]    Behavior over the last 24 hours: unchanged  Marylen Estelle is a 50-year-old female with a history of schizoaffective disorder bipolar type who presents for psychiatric follow-up  Staff reports that she continues to experience psychotic outbursts and frequently requiring p r n  Antipsychotics with only minimal efficacy  In the process of applying for [facility] referral   On approach, the patient remains rambling, disorganized and tangential with her speech  She is aware of her psychotic outbursts and tortured by them  Continues to endorse persecutory delusions that people are stalking her and poisoning her food  She is requesting to return to the Risperdal oral tablets as opposed to the dissolvable M tabs, as she states this did better for her psychotic symptoms  She continues to experience auditory hallucinations, now with commands to kill herself, though she denies any true intent stating, I would never actually hurt myself because of my ayanna    States she is now experiencing vague visual hallucinations of shadows in the corner of her eye  Denies homicidal ideations      Sleep: decreased, slept off and on  Appetite: normal  Medication side effects: No   ROS: all other systems are negative    Mental Status Evaluation:    Appearance:  age appropriate, casually dressed, adequate grooming   Behavior:  cooperative, calm, bizarre   Speech:  tangential, disorganized   Mood:  depressed, dysphoric   Affect:  blunted   Thought Process:  disorganized, tangential, impaired abstract reasoning   Associations: tangential associations   Thought Content:  persecutory delusions, obsessive thoughts, negative thinking, intrusive thoughts   Perceptual Disturbances: auditory hallucinations with commands to kill self, vague visual hallucinations of "shadows"   Risk Potential: Suicidal ideation - Yes, passive death wish, contracts for safety on the unit, would talk to staff if not feeling safe on the unit, would harm self if discharged  Homicidal ideation - None at present  Potential for aggression - No   Sensorium:  oriented to person, place and time/date   Memory:  recent and remote memory grossly intact   Consciousness:  alert and awake   Attention/Concentration: attention span and concentration are age appropriate   Insight:  limited   Judgment: limited   Gait/Station: normal gait/station, normal balance   Motor Activity: no abnormal movements     Vital signs in last 24 hours:    Temp:  [97 5 °F (36 4 °C)] 97 5 °F (36 4 °C)  HR:  [80] 80  Resp:  [18] 18  BP: (110)/(74) 110/74    Laboratory results: I have personally reviewed all pertinent laboratory/tests results    Most Recent Labs:   Lab Results   Component Value Date    WBC 9 70 03/16/2021    RBC 4 91 03/16/2021    HGB 14 9 03/16/2021    HCT 45 1 03/16/2021     03/16/2021    RDW 14 1 03/16/2021    NEUTROABS 7 10 (H) 03/16/2021    SODIUM 134 03/17/2021    K 4 0 03/17/2021    CL 99 03/17/2021    CO2 29 03/17/2021    BUN 14 03/17/2021    CREATININE 0 56 (L) 03/17/2021    GLUC 112 (H) 03/17/2021    CALCIUM 9 4 03/17/2021    AST 27 03/17/2021    ALT 18 03/17/2021    ALKPHOS 67 03/17/2021    TP 7 0 03/17/2021    ALB 4 2 03/17/2021    TBILI 0 40 03/17/2021    CHOLESTEROL 220 (H) 08/23/2020    HDL 73 08/23/2020    TRIG 218 (H) 08/23/2020    LDLCALC 103 (H) 08/23/2020    [place] 147 08/23/2020    AMMONIA 28 10/27/2017    GEX7ISVGUYKV 2 190 10/24/2019    FREET4 0 89 03/24/2016    PREGUR negative 03/16/2021    PREGSERUM Negative 10/21/2019    HCG <2 00 04/10/2014    RPR Non-Reactive 10/25/2018    HGBA1C 5 0 08/06/2019    EAG 97 08/06/2019       Progress Toward Goals: minimal progress over past 24 hours, possibly some regression in terms of severity of psychotic outbursts    Assessment/Plan   Principal Problem:    Schizoaffective disorder, bipolar type ([facility] [address] )  Active Problems:    LYNN (generalized anxiety disorder)    Mild intermittent asthma without complication    Tobacco abuse      Recommended Treatment:     Planned medication and treatment changes: All current active medications have been reviewed  Encourage group therapy, milieu therapy and occupational therapy  Behavioral Health checks every 7 minutes    Will switch Risperdal formulation from dissolvable M tabs back to oral tablets at patient's request   She will continue at the current dose of Risperdal 3 mg in the morning and 4 mg at bedtime  Awaiting [facility] referral   Patient in agreement with plan      Current Facility-Administered Medications   Medication Dose Route Frequency Provider Last Rate    acetaminophen  650 mg Oral Q6H PRN Yulia Smithm, DO      acetaminophen  650 mg Oral Q4H PRN Yulia Smithm, DO      acetaminophen  975 mg Oral Q6H PRN Yulia Hasoumyam, DO      albuterol  2 puff Inhalation Q6H PRN Yulia Smithm, DO      benztropine  1 mg Intramuscular Q4H PRN Max 6/day Yulia Bateman, DO      benztropine  1 mg Oral Q4H PRN Max 6/day Yulia Bateman, DO      bismuth subsalicylate  501 mg Oral Q6H PRN [place] Lara, DO      clonazePAM  0 5 mg Oral TID JARED Augustin      diphenhydrAMINE  25 mg Oral HS JARED Jones      escitalopram  10 mg Oral Daily Michelle Fisher PA-C      gabapentin  600 mg Oral 4x Daily Kimberly Fonseca MD      haloperidol  5 mg Oral Q6H PRN Raeann Quarles MD      hydrOXYzine HCL  25 mg Oral Q6H PRN Max 4/day Yulia Bateman, DO      hydrOXYzine HCL  50 mg Oral Q6H PRN Max 4/day Yulia Bateman, DO      lidocaine  1 patch Topical Daily José Luis Melchor MD      LORazepam  2 mg Intramuscular Q8H PRN Raeann Quarles MD      LORazepam  1 mg Oral Q8H PRN Raeann Quarles MD      nicotine  1 patch Transdermal Daily Yulia Bateman, DO      OLANZapine  10 mg Oral Q3H PRN Raeann Quarles MD      OLANZapine  2 5 mg Oral Q8H PRN Eugenia Bennett MD Florencio      OLANZapine  5 mg Oral Q3H PRN Janette Metz MD      OLANZapine  10 mg Intramuscular Q3H PRN Janette Metz MD      OLANZapine  5 mg Intramuscular Q3H PRN Janette Metz MD      Pramox-PE-Glycerin-Petrolatum   Rectal 4x Daily PRN JARED Alex      risperiDONE  3 mg Oral Daily Janette Metz MD      risperiDONE  4 mg Oral HS Janette Metz MD       Risks / Benefits of Treatment:    Risks, benefits, and possible side effects of medications explained to patient and patient verbalizes understanding and agreement for treatment  Counseling / Coordination of Care:    Patient's progress discussed with staff in treatment team meeting  Medications, treatment progress and treatment plan reviewed with patient      Narda Long PA-C 03/29/21

## 2021-03-29 NOTE — PROGRESS NOTES
Progress Note - Behavioral Health   Oscar Willoughby 53 y o  female MRN: @MRN   Unit/Bed#: [facility] 249-01 Encounter: [id number]       Report from staff regarding this patient received and discussed, and records reviewed prior to seeing this patient   Patient was superficially, polite she avoided talking about her thoughts and feelings today  She denied any problems but later on in the unit she became increasingly agitated, screaming, clearly responding in her delusional believes and likely auditory hallucinations  P r n  medications had to be provided  Sleep: decreased  Appetite: normal    Medication side effects:no complaints    Mental Status Evaluation:    Appearance:  casually dressed   Mood:  dysphoric, anxious   Affect: constricted    Speech:  decreased rate, slow   Thought Content:  paranoid ideation   Perceptual Disturbances: denies auditory or visual hallucinations when asked, but appears responding to internal stimuli   Risk Potential: Suicidal ideation - None, contracts for safety on the unit  Homicidal ideation - None  Potential for aggression - No   Insight:  impaired due to psychosis   Judgment: impaired due to psychosis   Motor Activity: no abnormal movements         Laboratory results:  I have personally reviewed all pertinent laboratory results  Assessment and plan: Will further increase risperidone ODT to 4 mg hs  Will monitor patient's psychotic symptoms  ** Please Note: This note has been constructed using a voice recognition system  **      BMP: No results for input(s): NA, K, CL, CO2, BUN in the last 72 hours      Invalid input(s): CREA, GLU  Vitals:    03/28/21 0700   BP: 116/71   Pulse: 83   Resp: 18   Temp: (!) 97 3 °F (36 3 °C)   SpO2: 97%        Medication Administration - last 24 hours from 03/27/2021 2133 to 03/28/2021 2133       Date/Time Order Dose Route Action Action by     03/28/2021 0707 albuterol (PROVENTIL HFA,VENTOLIN HFA) inhaler 2 puff 2 puff Inhalation Given Isabel Tran RN     03/27/2021 2204 hydrOXYzine HCL (ATARAX) tablet 25 mg 25 mg Oral Given Azalea Penn RN     03/28/2021 0815 nicotine (NICODERM CQ) 21 mg/24 hr TD 24 hr patch 1 patch 1 patch Transdermal Not Given Jimbo Varner RN     03/28/2021 0804 nicotine (NICODERM CQ) 21 mg/24 hr TD 24 hr patch 1 patch 1 patch Transdermal Patch Removed Jimbo Varner RN     03/28/2021 0758 nicotine (NICODERM CQ) 21 mg/24 hr TD 24 hr patch 1 patch 1 patch Transdermal Medication Applied Jimbo Varner RN     03/27/2021 2237 benztropine (COGENTIN) tablet 1 mg 1 mg Oral Given Karis Garcia RN     03/28/2021 2109 gabapentin (NEURONTIN) capsule 600 mg 600 mg Oral Given Azalea Penn, YUDY     03/28/2021 1805 gabapentin (NEURONTIN) capsule 600 mg 600 mg Oral Given Jimbo Varner RN     03/28/2021 1305 gabapentin (NEURONTIN) capsule 600 mg 600 mg Oral Given Jimbo Varner RN     03/28/2021 0803 gabapentin (NEURONTIN) capsule 600 mg 600 mg Oral Given Jimbo Varner RN     03/28/2021 2111 diphenhydrAMINE (BENADRYL) tablet 25 mg 25 mg Oral Given Azalea Penn RN     03/28/2021 0900 lidocaine (LIDODERM) 5 % patch 1 patch 1 patch Topical Not Given Jimbo Varner RN     03/28/2021 0804 lidocaine (LIDODERM) 5 % patch 1 patch 1 patch Topical Patch Removed Jimbo Varner RN     03/28/2021 0758 lidocaine (LIDODERM) 5 % patch 1 patch 1 patch Topical Medication Applied Jimbo Varner RN     03/28/2021 2112 clonazePAM (KlonoPIN) tablet 0 5 mg 0 5 mg Oral Given Azalea Penn RN     03/28/2021 1311 clonazePAM (KlonoPIN) tablet 0 5 mg 0 5 mg Oral Given Jimbo Varner RN     03/28/2021 0802 clonazePAM (KlonoPIN) tablet 0 5 mg 0 5 mg Oral Given Jimbo Varner RN     03/28/2021 0802 escitalopram (LEXAPRO) tablet 10 mg 10 mg Oral Given Jimbo Varner RN     03/28/2021 1522 OLANZapine (ZyPREXA ZYDIS) dispersible tablet 10 mg 10 mg Oral Given Jimbo Varner RN     03/28/2021 2109 risperiDONE (RisperDAL M-TABS) dispersible tablet 3 mg 3 mg Oral Given Gabby De Santiago RN     03/28/2021 0802 risperiDONE (RisperDAL M-TABS) dispersible tablet 3 mg 3 mg Oral Given Robetr Carter RN     03/27/2021 2342 risperiDONE (RisperDAL M-TABS) dispersible tablet 3 mg 3 mg Oral Not Given Marta Clark RN     03/28/2021 1618 Pramox-PE-Glycerin-Petrolatum (PREPARATION H MAX) 1-0 25-14 4-15 % rectal cream   Rectal Given Robert Carter RN     03/28/2021 0708 Pramox-PE-Glycerin-Petrolatum (PREPARATION H MAX) 1-0 25-14 4-15 % rectal cream 1 application Rectal Given Marta Clark RN     03/28/2021 0958 LORazepam (ATIVAN) injection 2 mg 2 mg Intramuscular Given Robert Carter RN

## 2021-03-29 NOTE — PROGRESS NOTES
Patient had a difficult night of sleep  She woke around 0200 and began screaming at the top of her lungs from her room  Screaming at people that are not there  Cursing and yelling  Difficult to redirect  States she is being tortured and "they" wont leave her alone  Requested and received prn ativan 1mg and zyprexa 10mg (po) which did help to calm her down  She was unable to go back to sleep  Very restless  Back and forth through the hallways  Continued with occasional outbursts in her room  Patient was told several times she needed to keep her voice down bc other patients were sleeping  Offered room 243  Initially declined but following next screaming outbursts a firm suggestion for her to spent time in 243 to relax was made  Patient agreeable  While in 243 patient expressed concern that she would never get better and would always be "tortured" in her head  Patient states she wants to go to [facility] and then to a group home where she can be monitored  She states her family also feels this would be best for her  She admits that she frequently skips her medications at home and has flushed meds given to her by [facility] down the toilet  Says she does not want to kill herself due to her Judaism beliefs but she cannot go on "with my head like this "  "Why do I get psychotic? It feels like my head is going to explode when it happens  I can't take it "  Explained to patient the importance of consistent medication compliance  Patient states she knows it is important but does not always follow through  Says sometimes she is just saying "the right thing" to get out of the hospital   She expressed interest in ECT which she said she has had twice in the past   States it did help her "for a little bit" but she did not follow through with the maintenance sessions  She would like to speak with the Dr regarding this option   Yelling and screaming stopped within an hour of receiving prn medications but she has been unable to go back to sleep    Using the ipad in room 243

## 2021-03-29 NOTE — PROGRESS NOTES
Patient bright, alert in am  Observed walking in hallways  Took all scheduled medications as ordered  Patient presents flat, appears restless  Went back to her room and showered and listened to music for coping  Will maintain on safety precautions and continual monitoring  No needs identified

## 2021-03-29 NOTE — PROGRESS NOTES
03/29/21 0800   Team Meeting   Meeting Type Daily Rounds   Initial Conference Date 03/29/21   Team Members Present   Team Members Present Physician;Nurse;   Physician Team Member Dr Chapo Devine; Hero Naranjo PA-C   Nursing Team Member Teresa Lao, RN   Social Work Team Member Tiara Morin   Patient/Family Present   Patient Present No   Patient's Family Present No     No improvement  Delusional  Screaming  Asking for STD test to be done  Was in quiet room this morning  PRNs given  Poor sleep   CM working on [facility] referral

## 2021-03-29 NOTE — PROGRESS NOTES
Patient yelling in room at unknown persons  Calling" them" many names  Pt unable to use her coping skills to calm down  She requested  Prn zyprexa  10mg zydis given  Will continue to monitor

## 2021-03-30 PROCEDURE — 99232 SBSQ HOSP IP/OBS MODERATE 35: CPT | Performed by: PHYSICIAN ASSISTANT

## 2021-03-30 RX ORDER — ESCITALOPRAM OXALATE 10 MG/1
20 TABLET ORAL DAILY
Status: DISCONTINUED | OUTPATIENT
Start: 2021-03-31 | End: 2021-04-07 | Stop reason: HOSPADM

## 2021-03-30 RX ORDER — CALCIUM CARBONATE 200(500)MG
500 TABLET,CHEWABLE ORAL DAILY PRN
Status: DISCONTINUED | OUTPATIENT
Start: 2021-03-30 | End: 2021-03-30

## 2021-03-30 RX ORDER — CALCIUM CARBONATE 200(500)MG
500 TABLET,CHEWABLE ORAL 3 TIMES DAILY PRN
Status: DISCONTINUED | OUTPATIENT
Start: 2021-03-30 | End: 2021-04-07 | Stop reason: HOSPADM

## 2021-03-30 RX ADMIN — GABAPENTIN 600 MG: 300 CAPSULE ORAL at 08:08

## 2021-03-30 RX ADMIN — Medication 524 MG: at 04:45

## 2021-03-30 RX ADMIN — CLONAZEPAM 0.5 MG: 0.5 TABLET ORAL at 08:08

## 2021-03-30 RX ADMIN — BENZTROPINE MESYLATE 1 MG: 1 TABLET ORAL at 19:11

## 2021-03-30 RX ADMIN — GABAPENTIN 600 MG: 300 CAPSULE ORAL at 21:44

## 2021-03-30 RX ADMIN — CLONAZEPAM 0.5 MG: 0.5 TABLET ORAL at 11:48

## 2021-03-30 RX ADMIN — BENZTROPINE MESYLATE 1 MG: 1 TABLET ORAL at 11:48

## 2021-03-30 RX ADMIN — CALCIUM CARBONATE (ANTACID) CHEW TAB 500 MG 500 MG: 500 CHEW TAB at 16:27

## 2021-03-30 RX ADMIN — CLONAZEPAM 0.5 MG: 0.5 TABLET ORAL at 21:44

## 2021-03-30 RX ADMIN — GABAPENTIN 600 MG: 300 CAPSULE ORAL at 11:48

## 2021-03-30 RX ADMIN — OLANZAPINE 10 MG: 10 TABLET, ORALLY DISINTEGRATING ORAL at 09:44

## 2021-03-30 RX ADMIN — OLANZAPINE 10 MG: 10 TABLET, ORALLY DISINTEGRATING ORAL at 16:24

## 2021-03-30 RX ADMIN — RISPERIDONE 3 MG: 2 TABLET ORAL at 08:07

## 2021-03-30 RX ADMIN — NICOTINE 1 PATCH: 21 PATCH, EXTENDED RELEASE TRANSDERMAL at 08:08

## 2021-03-30 RX ADMIN — LORAZEPAM 1 MG: 1 TABLET ORAL at 06:45

## 2021-03-30 RX ADMIN — RISPERIDONE 4 MG: 2 TABLET ORAL at 21:45

## 2021-03-30 RX ADMIN — ESCITALOPRAM OXALATE 10 MG: 10 TABLET ORAL at 08:08

## 2021-03-30 RX ADMIN — GABAPENTIN 600 MG: 300 CAPSULE ORAL at 17:03

## 2021-03-30 RX ADMIN — LIDOCAINE 1 PATCH: 50 PATCH CUTANEOUS at 08:08

## 2021-03-30 RX ADMIN — GLYCERIN, PETROLATUM, PHENYLEPHRINE HCL, PRAMOXINE HCL: 144; 2.5; 10; 15 CREAM TOPICAL at 04:45

## 2021-03-30 RX ADMIN — DIPHENHYDRAMINE HCL 25 MG: 25 TABLET ORAL at 21:45

## 2021-03-30 RX ADMIN — CALCIUM CARBONATE (ANTACID) CHEW TAB 500 MG 500 MG: 500 CHEW TAB at 11:48

## 2021-03-30 NOTE — PROGRESS NOTES
Patient slept through all of evening shift and through the night until 0400  Since waking, so far her behaviors have been calm and controlled  She did c/o stomach upset for which she received pepto bismol  She is also requesting Tums which has been added to the medical board  Additionally she states she has a somewhat loose tooth that she wants to pull out  Encouraged her to speak to medical about it in case the tooth can be saved    Using the ipad quietly in her room

## 2021-03-30 NOTE — PROGRESS NOTES
Progress Note - [address] 53 y o  female MRN: [id number]   Unit/Bed#: U 249-01 Encounter: [id number]    Behavior over the last 24 hours: unchanged  Patrick Dose is a 15-year-old female with a history of schizoaffective disorder bipolar type who presents for psychiatric follow-up  Staff reports that patient continues to experience psychotic outbursts with yelling and screaming in her room, minimal improvement with p r n  antipsychotics  She is currently awaiting EAC  She reports feeling more depressed today and continues to endorse suicidal thoughts, though she adamantly denies any intent or plan  This is associated with some anhedonia and decreased motivation  She continues to endorse persecutory delusions that people are stalking her and trying to poison her food  AH and VH unchanged  No HI      Sleep: slept off and on, nightmares  Appetite: normal  Medication side effects: No   ROS: all other systems are negative    Mental Status Evaluation:    Appearance:  age appropriate, casually dressed, adequate grooming   Behavior:  cooperative, calm, restless   Speech:  increased rate, tangential, disorganized   Mood:  depressed, dysphoric   Affect:  blunted   Thought Process:  disorganized, tangential   Associations: tangential associations   Thought Content:  persecutory delusions, obsessive thoughts, negative thinking, ruminating thoughts   Perceptual Disturbances: auditory hallucinations, vague visual hallucinations   Risk Potential: Suicidal ideation - Yes, fleeting suicidal thoughts, contracts for safety on the unit, would talk to staff if not feeling safe on the unit, would not feel safe if discharged  Homicidal ideation - None at present  Potential for aggression - No   Sensorium:  oriented to person, place and time/date   Memory:  recent and remote memory grossly intact   Consciousness:  alert and awake   Attention/Concentration: attention span and concentration are age appropriate Insight:  limited   Judgment: limited   Gait/Station: normal gait/station, normal balance   Motor Activity: no abnormal movements     Vital signs in last 24 hours:    Temp:  [97 5 °F (36 4 °C)-98 2 °F (36 8 °C)] 97 5 °F (36 4 °C)  HR:  [80] 80  Resp:  [18] 18  BP: (119)/(81-94) 119/81    Laboratory results: I have personally reviewed all pertinent laboratory/tests results    Most Recent Labs:   Lab Results   Component Value Date    WBC 9 70 03/16/2021    RBC 4 91 03/16/2021    HGB 14 9 03/16/2021    HCT 45 1 03/16/2021     03/16/2021    RDW 14 1 03/16/2021    NEUTROABS 7 10 (H) 03/16/2021    SODIUM 134 03/17/2021    K 4 0 03/17/2021    CL 99 03/17/2021    CO2 29 03/17/2021    BUN 14 03/17/2021    CREATININE 0 56 (L) 03/17/2021    GLUC 112 (H) 03/17/2021    CALCIUM 9 4 03/17/2021    AST 27 03/17/2021    ALT 18 03/17/2021    ALKPHOS 67 03/17/2021    TP 7 0 03/17/2021    ALB 4 2 03/17/2021    TBILI 0 40 03/17/2021    CHOLESTEROL 220 (H) 08/23/2020    HDL 73 08/23/2020    TRIG 218 (H) 08/23/2020    LDLCALC 103 (H) 08/23/2020    [place] 147 08/23/2020    AMMONIA 28 10/27/2017    WAF9JPRCGZJS 2 190 10/24/2019    FREET4 0 89 03/24/2016    PREGUR negative 03/16/2021    PREGSERUM Negative 10/21/2019    HCG <2 00 04/10/2014    RPR Non-Reactive 10/25/2018    HGBA1C 5 0 08/06/2019    EAG 97 08/06/2019       Progress Toward Goals: Minimal progress over past 24 hours, awaiting EAC referral    Assessment/Plan   Principal Problem:    Schizoaffective disorder, bipolar type ([address] )  Active Problems:    LYNN (generalized anxiety disorder)    Mild intermittent asthma without complication    Tobacco abuse      Recommended Treatment:     Planned medication and treatment changes:     All current active medications have been reviewed  Encourage group therapy, milieu therapy and occupational therapy  Behavioral Health checks every 7 minutes    Increase Lexapro to 20 mg daily to target continued depressive symptoms and passive suicidality  Patient does have some elements of OCD with her thought patterns and admits prior behaviors of excessive hand washing and cleaning the house prior to her admission  Explained that we would like to continue the Lexapro for at least the next week or 2 since she did have an initial favorable response  If no improvement in depressive symptoms after that time, then I would probably consider Luvox as there may be underlying untreated OCD  Continue current antipsychotic regimen  Patient initially hesitant to swap her PRN Zyprexa in favor of PRN Thorazine because, that made me suicidal in the past   Explained that this would not be a standing order for Thorazine, simply to help quell some of her more agitated, psychotic outbursts  She states she would like to see how she does on the Lexapro before switching her PRN antipsychotics       Current Facility-Administered Medications   Medication Dose Route Frequency Provider Last Rate    acetaminophen  650 mg Oral Q6H PRN Rhoda Nunes, DO      acetaminophen  650 mg Oral Q4H PRN Rhoda Nunes, DO      acetaminophen  975 mg Oral Q6H PRN Rhoda Nunes, DO      albuterol  2 puff Inhalation Q6H PRN Rhoda Nunes, DO      benztropine  1 mg Intramuscular Q4H PRN Max 6/day Rhoda Nunes, DO      benztropine  1 mg Oral Q4H PRN Max 6/day Rhoda Nunes, DO      bismuth subsalicylate  029 mg Oral Q6H PRN Arely Cooper, DO      clonazePAM  0 5 mg Oral TID JARED Augustin      diphenhydrAMINE  25 mg Oral HS JARED Jones      escitalopram  10 mg Oral Daily Marina Tong PA-C      gabapentin  600 mg Oral 4x Daily Marilyn Zhu MD      haloperidol  5 mg Oral Q6H PRN Mahesh Blair MD      hydrOXYzine HCL  25 mg Oral Q6H PRN Max 4/day Rhoda Nunes, DO      hydrOXYzine HCL  50 mg Oral Q6H PRN Max 4/day Rhoda Nunes, DO      lidocaine  1 patch Topical Daily Devang Smith MD      LORazepam  2 mg Intramuscular Q8H PRN Damaris Whitley MD      LORazepam  1 mg Oral Q8H PRN Damaris Whitley MD      magnesium hydroxide  30 mL Oral Daily PRN Iza Haider PA-C      nicotine  1 patch Transdermal Daily Simeon Jasbir, DO      OLANZapine  10 mg Oral Q3H PRN Damaris Whitley MD      OLANZapine  2 5 mg Oral Q8H PRN Damaris Whitley MD      OLANZapine  5 mg Oral Q3H PRN Damaris Whitley MD      OLANZapine  10 mg Intramuscular Q3H PRN Damaris Whitley MD      OLANZapine  5 mg Intramuscular Q3H PRN Damaris Whitley MD      Pramox-PE-Glycerin-Petrolatum   Rectal 4x Daily PRN JARED Landaverde      risperiDONE  3 mg Oral Daily Iza Haider PA-C      risperiDONE  4 mg Oral HS Iza Haider PA-C       Risks / Benefits of Treatment:    Risks, benefits, and possible side effects of medications explained to patient and patient verbalizes understanding and agreement for treatment  Counseling / Coordination of Care:    Patient's progress discussed with staff in treatment team meeting  Medications, treatment progress and treatment plan reviewed with patient      Iza Haider PA-C 03/30/21

## 2021-03-30 NOTE — PROGRESS NOTES
03/30/21 0730   Activity/Group Checklist   Group   (Goal Planning and Communication )   Attendance Attended   Attendance Duration (min) 46-60   Interactions Interacted appropriately   Affect/Mood Appropriate   Goals Achieved Identified feelings; Able to listen to others; Able to engage in interactions

## 2021-03-30 NOTE — PROGRESS NOTES
Patient yelling in room  She did try to attend group but did not stay long  Requested music for coping in room  Will continue to monitor

## 2021-03-30 NOTE — PROGRESS NOTES
Patient went to  to watch TV  Calm and pleasant  About fifteen minutes in, she started screaming and slamming furniture around  "Fuck you!  i'll fuck you up! You're a fucking scum bag! I'll kill you! I want you in senior care!"  No one in the room  Attempted to verbally de-esacalate patient and offer prn medication but she yelled "Fuck you! Are you trying to pill me to death?! " and stormed off  She then stopped and politely said, "Can I have some towels please? I'm going to shower "  Shower products provided  Patient went to her room and then started yelling briefly again  Quiet at the moment  In the shower

## 2021-03-30 NOTE — PLAN OF CARE
Problem: Nutrition/Hydration-ADULT  Goal: Nutrient/Hydration intake appropriate for improving, restoring or maintaining nutritional needs  Description: Monitor and assess patient's nutrition/hydration status for malnutrition  Collaborate with interdisciplinary team and initiate plan and interventions as ordered  Monitor patient's weight and dietary intake as ordered or per policy  Utilize nutrition screening tool and intervene as necessary  Determine patient's food preferences and provide high-protein, high-caloric foods as appropriate       INTERVENTIONS:  - Monitor oral intake, urinary output, labs, and treatment plans  - Assess nutrition and hydration status and recommend course of action  - Evaluate amount of meals eaten  - Assist patient with eating if necessary   - Allow adequate time for meals  - Recommend/ encourage appropriate diets, oral nutritional supplements, and vitamin/mineral supplements  - Order, calculate, and assess calorie counts as needed  - Recommend, monitor, and adjust tube feedings and TPN/PPN based on assessed needs  - Assess need for intravenous fluids  - Provide specific nutrition/hydration education as appropriate  - Include patient/family/caregiver in decisions related to nutrition  Outcome: Progressing     Problem: SUBSTANCE USE/ABUSE  Goal: Will have no detox symptoms and will verbalize plan for changing substance-related behavior  Description: INTERVENTIONS:  - Monitor physical status and assess for symptoms of withdrawal  - Administer medication as ordered  - Provide emotional support with 1 on 1 interaction with staff  - Encourage recovery focused program/ addiction education  - Assess for verbalization of changing behaviors related to substance abuse  - Initiate consults and referrals as appropriate (Case Management, Spiritual Care, etc )  Outcome: Progressing  Goal: By discharge, will develop insight into their chemical dependency and sustain motivation to continue in recovery  Description: INTERVENTIONS:  - Attends all daily group sessions and scheduled AA groups  - Actively practices coping skills through participation in the therapeutic community and adherence to program rules  - Reviews and completes assignments from individual treatment plan  - Assist patient development of understanding of their personal cycle of addiction and relapse triggers  Outcome: Progressing  Goal: By discharge, patient will have ongoing treatment plan addressing chemical dependency  Description: INTERVENTIONS:  - Assist patient with resources and/or appointments for ongoing recovery based living  Outcome: Progressing     Problem: SLEEP DISTURBANCE  Goal: Will exhibit normal sleeping pattern  Description: Interventions:  -  Assess the patients sleep pattern, noting recent changes  - Administer medication as ordered  - Decrease environmental stimuli, including noise, as appropriate during the night  - Encourage the patient to actively participate in unit groups and or exercise during the day to enhance ability to achieve adequate sleep at night  - Assess the patient, in the morning, encouraging a description of sleep experience  Outcome: Progressing     Problem: SELF CARE DEFICIT  Goal: Return ADL status to a safe level of function  Description: INTERVENTIONS:  - Administer medication as ordered  - Assess ADL deficits and provide assistive devices as needed  - Obtain PT/OT consults as needed  - Assist and instruct patient to increase activity and self care as tolerated  Outcome: Progressing     Problem: Alteration in Thoughts and Perception  Goal: Treatment Goal: Gain control of psychotic behaviors/thinking, reduce/eliminate presenting symptoms and demonstrate improved reality functioning upon discharge  Outcome: Progressing  Goal: Refrain from acting on delusional thinking/internal stimuli  Description: Interventions:  - Monitor patient closely, per order   - Utilize least restrictive measures   - Set reasonable limits, give positive feedback for acceptable   - Administer medications as ordered and monitor of potential side effects  Outcome: Progressing  Goal: Agree to be compliant with medication regime, as prescribed and report medication side effects  Description: Interventions:  - Offer appropriate PRN medication and supervise ingestion; conduct AIMS, as needed   Outcome: Progressing  Goal: Recognize dysfunctional thoughts, communicate reality-based thoughts at the time of discharge  Description: Interventions:  - Provide medication and psycho-education to assist patient in compliance and developing insight into his/her illness   Outcome: Progressing     Problem: Ineffective Coping  Goal: Identifies ineffective coping skills  Outcome: Progressing  Goal: Identifies healthy coping skills  Outcome: Progressing  Goal: Demonstrates healthy coping skills  Outcome: Progressing  Goal: Patient/Family participate in treatment and DC plans  Description: Interventions:  - Provide therapeutic environment  Outcome: Progressing  Goal: Patient/Family verbalizes awareness of resources  Outcome: Progressing  Goal: Understands least restrictive measures  Description: Interventions:  - Utilize least restrictive behavior  Outcome: Progressing  Goal: Free from restraint events  Description: - Utilize least restrictive measures   - Provide behavioral interventions   - Redirect inappropriate behaviors   Outcome: Progressing     Problem: Depression  Goal: Treatment Goal: Demonstrate behavioral control of depressive symptoms, verbalize feelings of improved mood/affect, and adopt new coping skills prior to discharge  Outcome: Progressing  Goal: Refrain from harming self  Description: Interventions:  - Monitor patient closely, per order   - Supervise medication ingestion, monitor effects and side effects   Outcome: Progressing  Goal: Refrain from isolation  Description: Interventions:  - Develop a trusting relationship   - Encourage socialization   Outcome: Progressing  Goal: Refrain from self-neglect  Outcome: Progressing     Problem: Anxiety  Goal: Anxiety is at manageable level  Description: Interventions:  - Assess and monitor patient's anxiety level  - Monitor for signs and symptoms (heart palpitations, chest pain, shortness of breath, headaches, nausea, feeling jumpy, restlessness, irritable, apprehensive)  - Collaborate with interdisciplinary team and initiate plan and interventions as ordered    - [place] patient to unit/surroundings  - Explain treatment plan  - Encourage participation in care  - Encourage verbalization of concerns/fears  - Identify coping mechanisms  - Assist in developing anxiety-reducing skills  - Administer/offer alternative therapies  - Limit or eliminate stimulants  Outcome: Progressing     Problem: DISCHARGE PLANNING - CARE MANAGEMENT  Goal: Discharge to post-acute care or home with appropriate resources  Description: INTERVENTIONS:  - Conduct assessment to determine patient/family and health care team treatment goals, and need for post-acute services based on payer coverage, community resources, and patient preferences, and barriers to discharge  - Address psychosocial, clinical, and financial barriers to discharge as identified in assessment in conjunction with the patient/family and health care team  - Arrange appropriate level of post-acute services according to patients   needs and preference and payer coverage in collaboration with the physician and health care team  - Communicate with and update the patient/family, physician, and health care team regarding progress on the discharge plan  - Arrange appropriate transportation to post-acute venues  Outcome: Progressing     Problem: Ineffective Coping  Goal: Participates in unit activities  Description: Interventions:  - Provide therapeutic environment   - Provide required programming   - Redirect inappropriate behaviors   Outcome: Progressing

## 2021-03-30 NOTE — PROGRESS NOTES
Patient alert and awake in am  Compliant with all scheduled medications without resistance  In late morning, pt requested prn  No outbursts, no yelling  She just wanted something to calm down  Agreeable to zyprexa  Will maintain on safety precautions and continual monitoring  No needs identified   pt attends groups but often leaves and comes back  Hygiene and appetite adequate  Pt denies all GI symptoms today

## 2021-03-30 NOTE — PROGRESS NOTES
Patient has been withdrawn to her room ans sleeping all evening  Several attempts were made to wake her so she would be able to sleep through the night tonight, however she slept through all attempts at waking her   She did wake to take her HS meds and then went immediately back to sleep

## 2021-03-30 NOTE — PROGRESS NOTES
03/30/21 0915   Team Meeting   Meeting Type Daily Rounds   Initial Conference Date 03/30/21   Team Members Present   Team Members Present Physician;Nurse;   Physician Team Member Dr Jillene Buerger Team Member Dara Dior RN   Social Work Team Member Umm Marcum   Patient/Family Present   Patient Present No   Patient's Family Present No       Still screaming at unpredictable daytime hours and during overnight hours  She upsets peers by screaming    Referral sent for request of Herber burroughs for initiating EAC referral

## 2021-03-30 NOTE — PROGRESS NOTES
03/30/21 1000   Activity/Group Checklist   Group   (Relaxation and Art Expression)   Attendance Attended   Attendance Duration (min) 0-15   Interactions Interacted appropriately   Affect/Mood Appropriate   Goals Achieved Identified feelings; Able to listen to others; Able to engage in interactions; Able to self-disclose; Able to recieve feedback

## 2021-03-31 LAB
CHOLEST SERPL-MCNC: 300 MG/DL (ref 0–200)
HBV CORE AB SER QL: NORMAL
HBV CORE IGM SER QL: NORMAL
HBV SURFACE AG SER QL: NORMAL
HCV AB SER QL: NORMAL
HDLC SERPL-MCNC: 57 MG/DL
LDLC SERPL DIRECT ASSAY-MCNC: 123.1 MG/DL (ref 0–100)
RPR SER QL: NORMAL
TRIGL SERPL-MCNC: 658 MG/DL (ref 44–166)

## 2021-03-31 PROCEDURE — 87340 HEPATITIS B SURFACE AG IA: CPT | Performed by: PHYSICIAN ASSISTANT

## 2021-03-31 PROCEDURE — 99232 SBSQ HOSP IP/OBS MODERATE 35: CPT | Performed by: HOSPITALIST

## 2021-03-31 PROCEDURE — 86803 HEPATITIS C AB TEST: CPT | Performed by: PHYSICIAN ASSISTANT

## 2021-03-31 PROCEDURE — 80061 LIPID PANEL: CPT | Performed by: PHYSICIAN ASSISTANT

## 2021-03-31 PROCEDURE — 87389 HIV-1 AG W/HIV-1&-2 AB AG IA: CPT | Performed by: PHYSICIAN ASSISTANT

## 2021-03-31 PROCEDURE — 86705 HEP B CORE ANTIBODY IGM: CPT | Performed by: PHYSICIAN ASSISTANT

## 2021-03-31 PROCEDURE — 83721 ASSAY OF BLOOD LIPOPROTEIN: CPT | Performed by: PHYSICIAN ASSISTANT

## 2021-03-31 PROCEDURE — 86704 HEP B CORE ANTIBODY TOTAL: CPT | Performed by: PHYSICIAN ASSISTANT

## 2021-03-31 PROCEDURE — 86592 SYPHILIS TEST NON-TREP QUAL: CPT | Performed by: PHYSICIAN ASSISTANT

## 2021-03-31 PROCEDURE — 87491 CHLMYD TRACH DNA AMP PROBE: CPT | Performed by: PHYSICIAN ASSISTANT

## 2021-03-31 PROCEDURE — 87591 N.GONORRHOEAE DNA AMP PROB: CPT | Performed by: PHYSICIAN ASSISTANT

## 2021-03-31 RX ADMIN — GABAPENTIN 600 MG: 300 CAPSULE ORAL at 21:37

## 2021-03-31 RX ADMIN — LIDOCAINE 1 PATCH: 50 PATCH CUTANEOUS at 08:48

## 2021-03-31 RX ADMIN — RISPERIDONE 4 MG: 2 TABLET ORAL at 21:38

## 2021-03-31 RX ADMIN — LORAZEPAM 1 MG: 1 TABLET ORAL at 12:48

## 2021-03-31 RX ADMIN — DIPHENHYDRAMINE HCL 25 MG: 25 TABLET ORAL at 21:38

## 2021-03-31 RX ADMIN — LORAZEPAM 1 MG: 1 TABLET ORAL at 03:41

## 2021-03-31 RX ADMIN — CLONAZEPAM 0.5 MG: 0.5 TABLET ORAL at 08:46

## 2021-03-31 RX ADMIN — NICOTINE 1 PATCH: 21 PATCH, EXTENDED RELEASE TRANSDERMAL at 08:48

## 2021-03-31 RX ADMIN — GABAPENTIN 600 MG: 300 CAPSULE ORAL at 17:32

## 2021-03-31 RX ADMIN — OLANZAPINE 2.5 MG: 5 TABLET, ORALLY DISINTEGRATING ORAL at 15:40

## 2021-03-31 RX ADMIN — CALCIUM CARBONATE (ANTACID) CHEW TAB 500 MG 500 MG: 500 CHEW TAB at 00:23

## 2021-03-31 RX ADMIN — MAGNESIUM HYDROXIDE 30 ML: 400 SUSPENSION ORAL at 10:10

## 2021-03-31 RX ADMIN — GABAPENTIN 600 MG: 300 CAPSULE ORAL at 11:37

## 2021-03-31 RX ADMIN — CALCIUM CARBONATE (ANTACID) CHEW TAB 500 MG 500 MG: 500 CHEW TAB at 12:49

## 2021-03-31 RX ADMIN — CLONAZEPAM 0.5 MG: 0.5 TABLET ORAL at 11:37

## 2021-03-31 RX ADMIN — CLONAZEPAM 0.5 MG: 0.5 TABLET ORAL at 21:38

## 2021-03-31 RX ADMIN — GABAPENTIN 600 MG: 300 CAPSULE ORAL at 08:45

## 2021-03-31 RX ADMIN — RISPERIDONE 3 MG: 2 TABLET ORAL at 08:46

## 2021-03-31 RX ADMIN — OLANZAPINE 10 MG: 10 TABLET, ORALLY DISINTEGRATING ORAL at 23:38

## 2021-03-31 RX ADMIN — ESCITALOPRAM OXALATE 20 MG: 10 TABLET ORAL at 08:46

## 2021-03-31 NOTE — PROGRESS NOTES
Patient was pleasant and cooperative during medication administration at breakfast time  She denied anxiety  Rated depression 3-4  Denied SI,HI  Stated she has auditory hallucinations at times but not currently  When she hears the voices they are degrading no commands  She was listening to music in her room and was calm  Patient later came to nurses station asking for PRN MOM  Administered PRN MOM and patient screamed at this nurse saying she was going to report me for torturing her  She did not attend group therapy  Q 7 minute safety checks maintained

## 2021-03-31 NOTE — PROGRESS NOTES
03/29/21 1000   Activity/Group Checklist   Group   (Creative Expression and Art Therapy Processing)   Attendance Attended   Attendance Duration (min) Greater than 60   Interactions Interacted appropriately   Affect/Mood Appropriate;Calm   Goals Achieved Identified feelings; Identified triggers; Discussed coping strategies; Able to listen to others; Able to engage in interactions

## 2021-03-31 NOTE — SOCIAL WORK
SW collaborated with pt's team, including Ken Ferrari, DAMIAN MH/DS [address] [place] [address], rep at [facility], regarding which [facility] is tentatively scheduled for 10:30 pm on 4/1/21

## 2021-03-31 NOTE — PLAN OF CARE
Problem: DISCHARGE PLANNING - CARE MANAGEMENT  Goal: Discharge to post-acute care or home with appropriate resources  Description: INTERVENTIONS:  - Conduct assessment to determine patient/family and health care team treatment goals, and need for post-acute services based on payer coverage, community resources, and patient preferences, and barriers to discharge  - Address psychosocial, clinical, and financial barriers to discharge as identified in assessment in conjunction with the patient/family and health care team  - Arrange appropriate level of post-acute services according to patients   needs and preference and payer coverage in collaboration with the physician and health care team  - Communicate with and update the patient/family, physician, and health care team regarding progress on the discharge plan  - Arrange appropriate transportation to post-acute venues  Outcome: Progressing     SW collaborated with pt's team, including Michelle Belcher, DAMIAN MH/DS [address] [place] [address], rep at [address] [place] [address], [facility] which is tentatively scheduled for 10:30 pm on 4/1/21

## 2021-03-31 NOTE — PROGRESS NOTES
03/31/21 0730   Activity/Group Checklist   Group   (Goal Planning and Communication )   Attendance Attended   Attendance Duration (min) 46-60   Interactions Interacted appropriately   Affect/Mood Appropriate;Calm   Goals Achieved Identified feelings; Discussed coping strategies; Able to listen to others; Able to engage in interactions

## 2021-03-31 NOTE — PROGRESS NOTES
Patient remained in room throughout the evening shift  Behaviors were controlled at that time  Nurse orientee attempted to wake patient for nighttime medications, patient was ignoring her and wouldn't open her eyes  Patient was compliant with medications after the 3rd attempt  Thought process remains frlusional  Speech is rambling and pressured  Will continue to observe during 7 minute safety checks

## 2021-03-31 NOTE — PROGRESS NOTES
Patient slept through most of evening shift  Multiple request during the night, Patient asked for medication for heartburn, tums at 0100  Sleep disrupted and patient became anxious, requested ativan 1 mg at 0341  Sleeping after lab draw

## 2021-03-31 NOTE — PROGRESS NOTES
Progress Note - [address] 53 y o  female MRN: [id number]   Unit/Bed#: U 249-01 Encounter: [id number]    Behavior over the last 24 hours: unchanged  Patrick Dose seen today, appears anxious and nervous  She reports ongoning depression, AH and feeling hopeless  States she the AH are episodic and very disturbing is why she has intermittent outbursts with yelling and agitation  States she does feel ability to remain safe on the unit  Discussed EAC referral with patient and she is agreeable to this plan     Staff report cooperative with intermittent outbusrsts in groups and on the unit  Sleep: slept off and on  Appetite: normal  Medication side effects: denied  ROS: no complaints, all other systems are negative    Mental Status Evaluation:    Appearance:  age appropriate, casually dressed, wearing pajamas   Behavior:  pleasant   Speech:  slow, scant   Mood:  depressed, anxious   Affect:  blunted, still constricted   Thought Process:  coherent   Associations: concrete associations   Thought Content:  mild paranoia   Perceptual Disturbances: auditory hallucinations   Risk Potential: Suicidal ideation - None at present  Homicidal ideation - None   Sensorium:  oriented to person, place and time/date   Memory:  recent and remote memory grossly intact   Consciousness:  alert and awake   Attention: attention span and concentration are age appropriate   Insight:  limited   Judgment: limited   Gait/Station: normal gait/station   Motor Activity: no abnormal movements     Vital signs in last 24 hours:    Temp:  [97 6 °F (36 4 °C)-97 9 °F (36 6 °C)] 97 6 °F (36 4 °C)  HR:  [96-98] 96  Resp:  [16-18] 18  BP: (109-111)/(77-80) 109/80    Laboratory results: I have personally reviewed all pertinent laboratory/tests results          Assessment/Plan   Principal Problem:    Schizoaffective disorder, bipolar type (HCC)  Active Problems:    LYNN (generalized anxiety disorder)    Mild intermittent asthma without complication    Tobacco abuse    Recommended Treatment:     Planned medication and treatment changes:  Continue Klonopin 0 5 mg 3 times a day  Continue Benadryl 25 mg at bedtime  Continue Lexapro 20 mg daily  Continue gabapentin 600 mg 4 times a day  Continue Risperdal 3 mg in the morning and 4 mg at bedtime  Disposition planning for EAC is being pursued  All current active medications have been reviewed  Encourage group therapy, milieu therapy and occupational therapy  Continue treatment with group therapy, milieu therapy and occupational therapy  Behavioral Health checks every 7 minutes  Current Facility-Administered Medications   Medication Dose Route Frequency Provider Last Rate    acetaminophen  650 mg Oral Q6H PRN Skip Eze, DO      acetaminophen  650 mg Oral Q4H PRN Skip Eze, DO      acetaminophen  975 mg Oral Q6H PRN Skip Eez, DO      albuterol  2 puff Inhalation Q6H PRN Skip Eze, DO      benztropine  1 mg Intramuscular Q4H PRN Max 6/day Skip Eze, DO      benztropine  1 mg Oral Q4H PRN Max 6/day Skip Eze, DO      bismuth subsalicylate  623 mg Oral Q6H PRN Jostin Magic, DO      calcium carbonate  500 mg Oral TID PRN Gerber Bhatt, PA-C      clonazePAM  0 5 mg Oral TID JARED Augustin      diphenhydrAMINE  25 mg Oral HS JARED Jones      escitalopram  20 mg Oral Daily Gerber Bhatt, PA-C      gabapentin  600 mg Oral 4x Daily Segundo Alfonso MD      haloperidol  5 mg Oral Q6H PRN Patito Monson MD      hydrOXYzine HCL  25 mg Oral Q6H PRN Max 4/day Skip Eze, DO      hydrOXYzine HCL  50 mg Oral Q6H PRN Max 4/day Skip Eze, DO      lidocaine  1 patch Topical Daily Ho Avendano MD      LORazepam  2 mg Intramuscular Q8H PRN Patito Monson MD      LORazepam  1 mg Oral Q8H PRN Patito Monson MD      magnesium hydroxide  30 mL Oral Daily PRN Gerber Bhatt PA-C      nicotine  1 patch Transdermal Daily Denis Hernandez,       OLANZapine  10 mg Oral Q3H PRN Guerline Boucher MD      OLANZapine  2 5 mg Oral Q8H PRN Guerline Boucher MD      OLANZapine  5 mg Oral Q3H PRN Guerline Boucher MD      OLANZapine  10 mg Intramuscular Q3H PRN Guerline Boucher MD      OLANZapine  5 mg Intramuscular Q3H PRN Guerline Boucher MD      Pramox-PE-Glycerin-Petrolatum   Rectal 4x Daily PRN JARED Vidal      risperiDONE  3 mg Oral Daily Michael Miller PA-C      risperiDONE  4 mg Oral HS Michael Miller PA-C         Risks / Benefits of Treatment:    Risks, benefits, and possible side effects of medications explained to patient and patient verbalizes understanding and agreement for treatment  Counseling / Coordination of Care: Total floor / unit time spent today 25 minutes  Greater than 50% of total time was spent with the patient and / or family counseling and / or coordination of care  A description of counseling / coordination of care:  Patient's progress discussed with staff in treatment team meeting  Medications, treatment progress and treatment plan reviewed with patient      Alex Ewing MD 03/31/21

## 2021-03-31 NOTE — PROGRESS NOTES
03/31/21 1000   Activity/Group Checklist   Group   ("Fill Your Cup" Art Therapy )   Attendance Did not attend  (pt attended for the first 5 min and then left )

## 2021-03-31 NOTE — PLAN OF CARE
Problem: Nutrition/Hydration-ADULT  Goal: Nutrient/Hydration intake appropriate for improving, restoring or maintaining nutritional needs  Description: Monitor and assess patient's nutrition/hydration status for malnutrition  Collaborate with interdisciplinary team and initiate plan and interventions as ordered  Monitor patient's weight and dietary intake as ordered or per policy  Utilize nutrition screening tool and intervene as necessary  Determine patient's food preferences and provide high-protein, high-caloric foods as appropriate       INTERVENTIONS:  - Monitor oral intake, urinary output, labs, and treatment plans  - Assess nutrition and hydration status and recommend course of action  - Evaluate amount of meals eaten  - Assist patient with eating if necessary   - Allow adequate time for meals  - Recommend/ encourage appropriate diets, oral nutritional supplements, and vitamin/mineral supplements  - Order, calculate, and assess calorie counts as needed  - Recommend, monitor, and adjust tube feedings and TPN/PPN based on assessed needs  - Assess need for intravenous fluids  - Provide specific nutrition/hydration education as appropriate  - Include patient/family/caregiver in decisions related to nutrition  Outcome: Progressing     Problem: SUBSTANCE USE/ABUSE  Goal: Will have no detox symptoms and will verbalize plan for changing substance-related behavior  Description: INTERVENTIONS:  - Monitor physical status and assess for symptoms of withdrawal  - Administer medication as ordered  - Provide emotional support with 1 on 1 interaction with staff  - Encourage recovery focused program/ addiction education  - Assess for verbalization of changing behaviors related to substance abuse  - Initiate consults and referrals as appropriate (Case Management, Spiritual Care, etc )  Outcome: Progressing  Goal: By discharge, will develop insight into their chemical dependency and sustain motivation to continue in recovery  Description: INTERVENTIONS:  - Attends all daily group sessions and scheduled AA groups  - Actively practices coping skills through participation in the therapeutic community and adherence to program rules  - Reviews and completes assignments from individual treatment plan  - Assist patient development of understanding of their personal cycle of addiction and relapse triggers  Outcome: Progressing  Goal: By discharge, patient will have ongoing treatment plan addressing chemical dependency  Description: INTERVENTIONS:  - Assist patient with resources and/or appointments for ongoing recovery based living  Outcome: Progressing     Problem: SLEEP DISTURBANCE  Goal: Will exhibit normal sleeping pattern  Description: Interventions:  -  Assess the patients sleep pattern, noting recent changes  - Administer medication as ordered  - Decrease environmental stimuli, including noise, as appropriate during the night  - Encourage the patient to actively participate in unit groups and or exercise during the day to enhance ability to achieve adequate sleep at night  - Assess the patient, in the morning, encouraging a description of sleep experience  Outcome: Progressing     Problem: SELF CARE DEFICIT  Goal: Return ADL status to a safe level of function  Description: INTERVENTIONS:  - Administer medication as ordered  - Assess ADL deficits and provide assistive devices as needed  - Obtain PT/OT consults as needed  - Assist and instruct patient to increase activity and self care as tolerated  Outcome: Progressing     Problem: Alteration in Thoughts and Perception  Goal: Treatment Goal: Gain control of psychotic behaviors/thinking, reduce/eliminate presenting symptoms and demonstrate improved reality functioning upon discharge  Outcome: Progressing  Goal: Refrain from acting on delusional thinking/internal stimuli  Description: Interventions:  - Monitor patient closely, per order   - Utilize least restrictive measures   - Set reasonable limits, give positive feedback for acceptable   - Administer medications as ordered and monitor of potential side effects  Outcome: Progressing  Goal: Agree to be compliant with medication regime, as prescribed and report medication side effects  Description: Interventions:  - Offer appropriate PRN medication and supervise ingestion; conduct AIMS, as needed   Outcome: Progressing  Goal: Recognize dysfunctional thoughts, communicate reality-based thoughts at the time of discharge  Description: Interventions:  - Provide medication and psycho-education to assist patient in compliance and developing insight into his/her illness   Outcome: Progressing     Problem: Ineffective Coping  Goal: Identifies ineffective coping skills  Outcome: Progressing  Goal: Identifies healthy coping skills  Outcome: Progressing  Goal: Demonstrates healthy coping skills  Outcome: Progressing  Goal: Patient/Family participate in treatment and DC plans  Description: Interventions:  - Provide therapeutic environment  Outcome: Progressing  Goal: Patient/Family verbalizes awareness of resources  Outcome: Progressing  Goal: Understands least restrictive measures  Description: Interventions:  - Utilize least restrictive behavior  Outcome: Progressing  Goal: Free from restraint events  Description: - Utilize least restrictive measures   - Provide behavioral interventions   - Redirect inappropriate behaviors   Outcome: Progressing     Problem: Depression  Goal: Treatment Goal: Demonstrate behavioral control of depressive symptoms, verbalize feelings of improved mood/affect, and adopt new coping skills prior to discharge  Outcome: Progressing  Goal: Refrain from harming self  Description: Interventions:  - Monitor patient closely, per order   - Supervise medication ingestion, monitor effects and side effects   Outcome: Progressing  Goal: Refrain from isolation  Description: Interventions:  - Develop a trusting relationship   - Encourage socialization   Outcome: Progressing  Goal: Refrain from self-neglect  Outcome: Progressing     Problem: Anxiety  Goal: Anxiety is at manageable level  Description: Interventions:  - Assess and monitor patient's anxiety level  - Monitor for signs and symptoms (heart palpitations, chest pain, shortness of breath, headaches, nausea, feeling jumpy, restlessness, irritable, apprehensive)  - Collaborate with interdisciplinary team and initiate plan and interventions as ordered    - [place] patient to unit/surroundings  - Explain treatment plan  - Encourage participation in care  - Encourage verbalization of concerns/fears  - Identify coping mechanisms  - Assist in developing anxiety-reducing skills  - Administer/offer alternative therapies  - Limit or eliminate stimulants  Outcome: Progressing     Problem: Ineffective Coping  Goal: Participates in unit activities  Description: Interventions:  - Provide therapeutic environment   - Provide required programming   - Redirect inappropriate behaviors   Outcome: Progressing

## 2021-03-31 NOTE — PROGRESS NOTES
03/31/21 0800   Team Meeting   Meeting Type Daily Rounds   Initial Conference Date 03/31/21   Team Members Present   Team Members Present Physician;Nurse;   Physician Team Member Dr Christy Quarles Team Member Héctor Prado RN   Social Work Team Member Tiara Franco   Patient/Family Present   Patient Present No   Patient's Family Present No     [address] Meeting to be held tomorrow  If county and insurance are in agreement, [facility] referral can be made  No changes  Still psychotic  Still screaming out sporadically  Labile mood  Meds adjusting still

## 2021-03-31 NOTE — NURSING NOTE
Patient observed walking on unit throughout the afternoon  Pt requested PRN Zyprexa 2 5mg at 1540 r/t hearing voices  Pt had one outburst at 2090 0920 which included her slamming her door multiple times and screaming  At 1640 pt requested to use the quiet room  Pt currently resting quietly inside of quiet room  Will CTM

## 2021-03-31 NOTE — PROGRESS NOTES
03/31/21 1330   Activity/Group Checklist   Group   (Meditation and [employer])   Attendance Attended   Attendance Duration (min) 0-15  (pt was there for meditation, left after)   Interactions Interacted appropriately   Affect/Mood Appropriate;Calm   Goals Achieved Able to listen to others

## 2021-04-01 LAB
C TRACH DNA SPEC QL NAA+PROBE: NEGATIVE
HIV 1+2 AB+HIV1 P24 AG SERPL QL IA: NORMAL
N GONORRHOEA DNA SPEC QL NAA+PROBE: NEGATIVE

## 2021-04-01 PROCEDURE — 99232 SBSQ HOSP IP/OBS MODERATE 35: CPT | Performed by: PHYSICIAN ASSISTANT

## 2021-04-01 RX ORDER — FENOFIBRATE 145 MG/1
145 TABLET, COATED ORAL DAILY
Status: DISCONTINUED | OUTPATIENT
Start: 2021-04-02 | End: 2021-04-07 | Stop reason: HOSPADM

## 2021-04-01 RX ADMIN — GABAPENTIN 600 MG: 300 CAPSULE ORAL at 17:58

## 2021-04-01 RX ADMIN — RISPERIDONE 3 MG: 2 TABLET ORAL at 07:36

## 2021-04-01 RX ADMIN — GABAPENTIN 600 MG: 300 CAPSULE ORAL at 22:06

## 2021-04-01 RX ADMIN — DIPHENHYDRAMINE HCL 25 MG: 25 TABLET ORAL at 22:06

## 2021-04-01 RX ADMIN — OLANZAPINE 5 MG: 5 TABLET, ORALLY DISINTEGRATING ORAL at 15:19

## 2021-04-01 RX ADMIN — CLONAZEPAM 0.5 MG: 0.5 TABLET ORAL at 07:35

## 2021-04-01 RX ADMIN — GABAPENTIN 600 MG: 300 CAPSULE ORAL at 07:35

## 2021-04-01 RX ADMIN — LORAZEPAM 1 MG: 1 TABLET ORAL at 16:21

## 2021-04-01 RX ADMIN — RISPERIDONE 4 MG: 2 TABLET ORAL at 22:07

## 2021-04-01 RX ADMIN — CLONAZEPAM 0.5 MG: 0.5 TABLET ORAL at 11:57

## 2021-04-01 RX ADMIN — GABAPENTIN 600 MG: 300 CAPSULE ORAL at 11:57

## 2021-04-01 RX ADMIN — LIDOCAINE 1 PATCH: 50 PATCH CUTANEOUS at 07:35

## 2021-04-01 RX ADMIN — ESCITALOPRAM OXALATE 20 MG: 10 TABLET ORAL at 07:36

## 2021-04-01 RX ADMIN — LORAZEPAM 1 MG: 1 TABLET ORAL at 00:39

## 2021-04-01 RX ADMIN — CLONAZEPAM 0.5 MG: 0.5 TABLET ORAL at 22:06

## 2021-04-01 NOTE — PROGRESS NOTES
04/01/21 0730   Activity/Group Checklist   Group   (Goal Planning and Communication )   Attendance Attended   Attendance Duration (min) 46-60   Interactions Interacted appropriately   Affect/Mood Appropriate   Goals Achieved Identified feelings; Discussed coping strategies; Able to listen to others; Able to engage in interactions

## 2021-04-01 NOTE — PROGRESS NOTES
Progress Note - [address] 53 y o  female MRN: [id number]   Unit/Bed#: [facility] 249-01 Encounter: [id number]    Behavior over the last 24 hours: unchanged  Bea Bello is a 52-year-old female with a history of schizoaffective disorder bipolar type who presents for psychiatric follow-up  Patient reports that her depression is improving and she no longer feels suicidal   She does report feeling more angered and having difficulty concentrating  Continues to experience frequent psychotic outbursts which require PRN Zyprexa to help calm down  She states, I still have fears of going psycho  It has been bothering me since I was young    [facility] referral is pending  Auditory hallucinations are unchanged, no longer with visual hallucinations  Med and meal compliant      Sleep: normal  Appetite: normal  Medication side effects: No   ROS: no complaints, all other systems are negative    Mental Status Evaluation:    Appearance:  age appropriate, adequate grooming, wearing a robe   Behavior:  cooperative, calm   Speech:  normal rate and volume, at times tangential, at times disorganized    Mood:  depressed, dysphoric   Affect:  blunted   Thought Process:  normal rate of thoughts, tangential   Associations: tangential associations   Thought Content:  persecutory delusions, paranoid ideation, obsessive thoughts, negative thinking, intrusive thoughts   Perceptual Disturbances: auditory hallucinations, no visual hallucinations   Risk Potential: Suicidal ideation - improved, no longer experiencing thoughts of suicide  Homicidal ideation - None at present  Potential for aggression - No   Sensorium:  oriented to person, place and time/date   Memory:  recent and remote memory grossly intact   Consciousness:  alert and awake   Attention/Concentration: attention span and concentration are age appropriate   Insight:  limited   Judgment: limited   Gait/Station: normal gait/station, normal balance   Motor Activity: no abnormal movements     Vital signs in last 24 hours:    Temp:  [97 5 °F (36 4 °C)-98 °F (36 7 °C)] 97 5 °F (36 4 °C)  HR:  [70-82] 82  Resp:  [16] 16  BP: (104-115)/(75-76) 115/75    Laboratory results: I have personally reviewed all pertinent laboratory/tests results    Most Recent Labs:   Lab Results   Component Value Date    WBC 9 70 03/16/2021    RBC 4 91 03/16/2021    HGB 14 9 03/16/2021    HCT 45 1 03/16/2021     03/16/2021    RDW 14 1 03/16/2021    NEUTROABS 7 10 (H) 03/16/2021    SODIUM 134 03/17/2021    K 4 0 03/17/2021    CL 99 03/17/2021    CO2 29 03/17/2021    BUN 14 03/17/2021    CREATININE 0 56 (L) 03/17/2021    GLUC 112 (H) 03/17/2021    CALCIUM 9 4 03/17/2021    AST 27 03/17/2021    ALT 18 03/17/2021    ALKPHOS 67 03/17/2021    TP 7 0 03/17/2021    ALB 4 2 03/17/2021    TBILI 0 40 03/17/2021    CHOLESTEROL 300 (H) 03/31/2021    HDL 57 03/31/2021    TRIG 658 (H) 03/31/2021    [address]  03/31/2021      Comment:      Calculated LDL invalid, triglycerides >400 mg/dl    NONHDLC 147 08/23/2020    AMMONIA 28 10/27/2017    THC3EHFDDDXN 2 190 10/24/2019    FREET4 0 89 03/24/2016    PREGUR negative 03/16/2021    PREGSERUM Negative 10/21/2019    HCG <2 00 04/10/2014    RPR Non-Reactive 03/31/2021    HGBA1C 5 0 08/06/2019    EAG 97 08/06/2019       Progress Toward Goals: progress ongoing, still with psychotic outbursts    Assessment/Plan   Principal Problem:    Schizoaffective disorder, bipolar type ([address] )  Active Problems:    LYNN (generalized anxiety disorder)    Mild intermittent asthma without complication    Tobacco abuse      Recommended Treatment:     Planned medication and treatment changes: All current active medications have been reviewed  Encourage group therapy, milieu therapy and occupational therapy  Behavioral Health checks every 7 minutes    Continue current medicines  Discussed potentially switching her PRN Zyprexa in favor of PRN Thorazine for her recurrent psychotic outbursts    We agreed to wait another day or 2 and see how she does with the Zyprexa as her PRN agent  E[facility] referral pending      Current Facility-Administered Medications   Medication Dose Route Frequency Provider Last Rate    acetaminophen  650 mg Oral Q6H PRN Gwenevere Trell, DO      acetaminophen  650 mg Oral Q4H PRN Gwenevere Trell, DO      acetaminophen  975 mg Oral Q6H PRN Gwenevere Hoyer, DO      albuterol  2 puff Inhalation Q6H PRN Gwenelauren Cai, DO      benztropine  1 mg Intramuscular Q4H PRN Max 6/day Gwenevere Hoyer, DO      benztropine  1 mg Oral Q4H PRN Max 6/day Gwenevere Hoyer, DO      bismuth subsalicylate  209 mg Oral Q6H PRN Derryl Revel, DO      calcium carbonate  500 mg Oral TID PRN Nisha Lewis PA-C      clonazePAM  0 5 mg Oral TID Jalyn Net, JARED      diphenhydrAMINE  25 mg Oral HS JARED Jones      escitalopram  20 mg Oral Daily Nisha Lewis PA-C      gabapentin  600 mg Oral 4x Daily Nuzhat Stuart MD      haloperidol  5 mg Oral Q6H PRN Jolynn Camejo MD      hydrOXYzine HCL  25 mg Oral Q6H PRN Max 4/day Jessicavere Trell, DO      hydrOXYzine HCL  50 mg Oral Q6H PRN Max 4/day Jessicavere Trell, DO      lidocaine  1 patch Topical Daily [place] Isabel MD      LORazepam  2 mg Intramuscular Q8H PRN Jolynn Camejo MD      LORazepam  1 mg Oral Q8H PRN Jolynn Camejo MD      magnesium hydroxide  30 mL Oral Daily PRN Nisha Lewis PA-C      nicotine  1 patch Transdermal Daily Gwtodd Cai, DO      OLANZapine  10 mg Oral Q3H PRN Jolynn Camejo MD      OLANZapine  2 5 mg Oral Q8H PRN Jolynn Camejo MD      OLANZapine  5 mg Oral Q3H PRN Jolynn Camejo MD      OLANZapine  10 mg Intramuscular Q3H PRN Jolynn Camejo MD      OLANZapine  5 mg Intramuscular Q3H PRN Jolynn Camejo MD      Pramox-PE-Glycerin-Petrolatum   Rectal 4x Daily PRN Areta Parsheng, RENATANP      risperiDONE  3 mg Oral Daily Nisha Lewis PA-C      risperiDONE  4 mg Oral HS Skip Thompson PA-C       Risks / Benefits of Treatment:    Risks, benefits, and possible side effects of medications explained to patient and patient verbalizes understanding and agreement for treatment  Counseling / Coordination of Care:    Patient's progress discussed with staff in treatment team meeting  Medications, treatment progress and treatment plan reviewed with patient      Skip Thompson PA-C 04/01/21

## 2021-04-01 NOTE — PROGRESS NOTES
Patient loud and yelling in hallway at change of shift  Responding to auditory hallucinations, accusitory in  [place]  Pressured, rambling delusional statements, unable to listen to this nurses redirections  Requested am medications  Gave them slightly earlier than scheduled as nursing judgement  She tried to use music as coping  Back to bed  Had 1 or 2 outbursts in room to self  Will maintain on safety  Precautions and continual monitoring  No needs identified

## 2021-04-01 NOTE — PLAN OF CARE
Problem: Nutrition/Hydration-ADULT  Goal: Nutrient/Hydration intake appropriate for improving, restoring or maintaining nutritional needs  Description: Monitor and assess patient's nutrition/hydration status for malnutrition  Collaborate with interdisciplinary team and initiate plan and interventions as ordered  Monitor patient's weight and dietary intake as ordered or per policy  Utilize nutrition screening tool and intervene as necessary  Determine patient's food preferences and provide high-protein, high-caloric foods as appropriate       INTERVENTIONS:  - Monitor oral intake, urinary output, labs, and treatment plans  - Assess nutrition and hydration status and recommend course of action  - Evaluate amount of meals eaten  - Assist patient with eating if necessary   - Allow adequate time for meals  - Recommend/ encourage appropriate diets, oral nutritional supplements, and vitamin/mineral supplements  - Order, calculate, and assess calorie counts as needed  - Recommend, monitor, and adjust tube feedings and TPN/PPN based on assessed needs  - Assess need for intravenous fluids  - Provide specific nutrition/hydration education as appropriate  - Include patient/family/caregiver in decisions related to nutrition  Outcome: Progressing     Problem: SUBSTANCE USE/ABUSE  Goal: Will have no detox symptoms and will verbalize plan for changing substance-related behavior  Description: INTERVENTIONS:  - Monitor physical status and assess for symptoms of withdrawal  - Administer medication as ordered  - Provide emotional support with 1 on 1 interaction with staff  - Encourage recovery focused program/ addiction education  - Assess for verbalization of changing behaviors related to substance abuse  - Initiate consults and referrals as appropriate (Case Management, Spiritual Care, etc )  Outcome: Progressing  Goal: By discharge, will develop insight into their chemical dependency and sustain motivation to continue in recovery  Description: INTERVENTIONS:  - Attends all daily group sessions and scheduled AA groups  - Actively practices coping skills through participation in the therapeutic community and adherence to program rules  - Reviews and completes assignments from individual treatment plan  - Assist patient development of understanding of their personal cycle of addiction and relapse triggers  Outcome: Progressing  Goal: By discharge, patient will have ongoing treatment plan addressing chemical dependency  Description: INTERVENTIONS:  - Assist patient with resources and/or appointments for ongoing recovery based living  Outcome: Progressing     Problem: SLEEP DISTURBANCE  Goal: Will exhibit normal sleeping pattern  Description: Interventions:  -  Assess the patients sleep pattern, noting recent changes  - Administer medication as ordered  - Decrease environmental stimuli, including noise, as appropriate during the night  - Encourage the patient to actively participate in unit groups and or exercise during the day to enhance ability to achieve adequate sleep at night  - Assess the patient, in the morning, encouraging a description of sleep experience  Outcome: Progressing     Problem: SELF CARE DEFICIT  Goal: Return ADL status to a safe level of function  Description: INTERVENTIONS:  - Administer medication as ordered  - Assess ADL deficits and provide assistive devices as needed  - Obtain PT/OT consults as needed  - Assist and instruct patient to increase activity and self care as tolerated  Outcome: Progressing     Problem: Alteration in Thoughts and Perception  Goal: Treatment Goal: Gain control of psychotic behaviors/thinking, reduce/eliminate presenting symptoms and demonstrate improved reality functioning upon discharge  Outcome: Progressing  Goal: Refrain from acting on delusional thinking/internal stimuli  Description: Interventions:  - Monitor patient closely, per order   - Utilize least restrictive measures   - Set reasonable limits, give positive feedback for acceptable   - Administer medications as ordered and monitor of potential side effects  Outcome: Progressing  Goal: Agree to be compliant with medication regime, as prescribed and report medication side effects  Description: Interventions:  - Offer appropriate PRN medication and supervise ingestion; conduct AIMS, as needed   Outcome: Progressing  Goal: Recognize dysfunctional thoughts, communicate reality-based thoughts at the time of discharge  Description: Interventions:  - Provide medication and psycho-education to assist patient in compliance and developing insight into his/her illness   Outcome: Progressing     Problem: Ineffective Coping  Goal: Identifies ineffective coping skills  Outcome: Progressing  Goal: Identifies healthy coping skills  Outcome: Progressing  Goal: Demonstrates healthy coping skills  Outcome: Progressing  Goal: Patient/Family participate in treatment and DC plans  Description: Interventions:  - Provide therapeutic environment  Outcome: Progressing  Goal: Patient/Family verbalizes awareness of resources  Outcome: Progressing  Goal: Understands least restrictive measures  Description: Interventions:  - Utilize least restrictive behavior  Outcome: Progressing  Goal: Free from restraint events  Description: - Utilize least restrictive measures   - Provide behavioral interventions   - Redirect inappropriate behaviors   Outcome: Progressing     Problem: Depression  Goal: Treatment Goal: Demonstrate behavioral control of depressive symptoms, verbalize feelings of improved mood/affect, and adopt new coping skills prior to discharge  Outcome: Progressing  Goal: Refrain from harming self  Description: Interventions:  - Monitor patient closely, per order   - Supervise medication ingestion, monitor effects and side effects   Outcome: Progressing  Goal: Refrain from isolation  Description: Interventions:  - Develop a trusting relationship   - Encourage socialization   Outcome: Progressing  Goal: Refrain from self-neglect  Outcome: Progressing     Problem: Anxiety  Goal: Anxiety is at manageable level  Description: Interventions:  - Assess and monitor patient's anxiety level  - Monitor for signs and symptoms (heart palpitations, chest pain, shortness of breath, headaches, nausea, feeling jumpy, restlessness, irritable, apprehensive)  - Collaborate with interdisciplinary team and initiate plan and interventions as ordered    - [place] patient to unit/surroundings  - Explain treatment plan  - Encourage participation in care  - Encourage verbalization of concerns/fears  - Identify coping mechanisms  - Assist in developing anxiety-reducing skills  - Administer/offer alternative therapies  - Limit or eliminate stimulants  Outcome: Progressing     Problem: DISCHARGE PLANNING - CARE MANAGEMENT  Goal: Discharge to post-acute care or home with appropriate resources  Description: INTERVENTIONS:  - Conduct assessment to determine patient/family and health care team treatment goals, and need for post-acute services based on payer coverage, community resources, and patient preferences, and barriers to discharge  - Address psychosocial, clinical, and financial barriers to discharge as identified in assessment in conjunction with the patient/family and health care team  - Arrange appropriate level of post-acute services according to patients   needs and preference and payer coverage in collaboration with the physician and health care team  - Communicate with and update the patient/family, physician, and health care team regarding progress on the discharge plan  - Arrange appropriate transportation to post-acute venues  Outcome: Progressing     Problem: Ineffective Coping  Goal: Participates in unit activities  Description: Interventions:  - Provide therapeutic environment   - Provide required programming   - Redirect inappropriate behaviors   Outcome: Progressing

## 2021-04-01 NOTE — PROGRESS NOTES
Patient was awake from approximately [phone]  Patient kept coming to nurses station asking for food and drinks  Patient in bed at current time and is sleeping

## 2021-04-01 NOTE — PROGRESS NOTES
04/01/21 1000   Activity/Group Checklist   Group   [facility] [employer])   Attendance Attended   Attendance Duration (min) 0-15   Interactions Interacted appropriately   Affect/Mood Appropriate   Goals Achieved Identified feelings; Able to listen to others; Able to engage in interactions
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
## 2021-04-01 NOTE — PROGRESS NOTES
Patient remains sleeping at current time  No signs or symptoms of pain, discomfort or respiratory distress  No bizarre behaviors observed during sleeping hours  Will be maintained on 7 minute safety checks

## 2021-04-02 PROCEDURE — 99232 SBSQ HOSP IP/OBS MODERATE 35: CPT | Performed by: PHYSICIAN ASSISTANT

## 2021-04-02 RX ORDER — PRAZOSIN HYDROCHLORIDE 1 MG/1
1 CAPSULE ORAL
Status: DISCONTINUED | OUTPATIENT
Start: 2021-04-02 | End: 2021-04-07 | Stop reason: HOSPADM

## 2021-04-02 RX ADMIN — OLANZAPINE 5 MG: 5 TABLET, ORALLY DISINTEGRATING ORAL at 01:54

## 2021-04-02 RX ADMIN — NICOTINE 1 PATCH: 21 PATCH, EXTENDED RELEASE TRANSDERMAL at 08:06

## 2021-04-02 RX ADMIN — ESCITALOPRAM OXALATE 20 MG: 10 TABLET ORAL at 08:06

## 2021-04-02 RX ADMIN — LORAZEPAM 1 MG: 1 TABLET ORAL at 00:51

## 2021-04-02 RX ADMIN — CLONAZEPAM 0.5 MG: 0.5 TABLET ORAL at 12:21

## 2021-04-02 RX ADMIN — GABAPENTIN 600 MG: 300 CAPSULE ORAL at 17:01

## 2021-04-02 RX ADMIN — RISPERIDONE 3 MG: 2 TABLET ORAL at 08:07

## 2021-04-02 RX ADMIN — LORAZEPAM 1 MG: 1 TABLET ORAL at 15:37

## 2021-04-02 RX ADMIN — OLANZAPINE 10 MG: 10 TABLET, ORALLY DISINTEGRATING ORAL at 14:39

## 2021-04-02 RX ADMIN — CLONAZEPAM 0.5 MG: 0.5 TABLET ORAL at 08:07

## 2021-04-02 RX ADMIN — BENZTROPINE MESYLATE 1 MG: 1 TABLET ORAL at 14:38

## 2021-04-02 RX ADMIN — LIDOCAINE 1 PATCH: 50 PATCH CUTANEOUS at 08:06

## 2021-04-02 RX ADMIN — FENOFIBRATE 145 MG: 145 TABLET, FILM COATED ORAL at 08:07

## 2021-04-02 RX ADMIN — GABAPENTIN 600 MG: 300 CAPSULE ORAL at 08:07

## 2021-04-02 RX ADMIN — RISPERIDONE 4 MG: 2 TABLET ORAL at 21:36

## 2021-04-02 RX ADMIN — OLANZAPINE 2.5 MG: 5 TABLET, ORALLY DISINTEGRATING ORAL at 06:16

## 2021-04-02 RX ADMIN — CALCIUM CARBONATE (ANTACID) CHEW TAB 500 MG 500 MG: 500 CHEW TAB at 02:22

## 2021-04-02 RX ADMIN — GABAPENTIN 600 MG: 300 CAPSULE ORAL at 21:35

## 2021-04-02 RX ADMIN — GABAPENTIN 600 MG: 300 CAPSULE ORAL at 12:21

## 2021-04-02 RX ADMIN — CLONAZEPAM 0.5 MG: 0.5 TABLET ORAL at 21:36

## 2021-04-02 RX ADMIN — DIPHENHYDRAMINE HCL 25 MG: 25 TABLET ORAL at 21:36

## 2021-04-02 NOTE — SOCIAL WORK
SW participated in [facility] mtg yesterday at which all team members, including Ayde Mistry, [facility] MH/DS CHIP Coordinator, Ruperto Vickers, [id number] Edwar Moreno, Nino Zheng CM at [facility], and [employer] and [facility] reps, all supported pt's referral to [facility] regarding which pt is in agreement  SW notified pt of same, regarding which she expressed appreciation  Referral to [facility] will be started
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
## 2021-04-02 NOTE — SOCIAL WORK
SW called pt's daughter Mildred Denson who speaks with pt, usually on a daily basis, regarding which she perceives pt to need long-term intervention  SW noted that was referred by pt's U and community support teams for [facility] placement regarding which pt's referral to [place] [address] [facility] placement was made today

## 2021-04-02 NOTE — PROGRESS NOTES
04/02/21 0804   Team Meeting   Meeting Type Daily Rounds   Initial Conference Date 04/02/21   Team Members Present   Team Members Present Physician;Nurse;   Physician Team Member Dr Gee Members; Lei Odom PA-C   Nursing Team Member Ana Lilia Reno, RN   Social Work Team Member Wendy Maddox [place]   Patient/Family Present   Patient Present No   Patient's Family Present No     [facility] mtg yesterday at which all team members supported pt's referral to [facility] regarding which pt is in agreement  EAC referral started today  Pt is not better  Respirdal will be changed to dissolvable

## 2021-04-02 NOTE — SOCIAL WORK
CM faxed EAC referral to Raymond Lugo CMP MH/DS CHIPP Coordinator and Veda Fam [place] [id number] MH/DS CHIPP  for review  CM also faxed EAC referral to Gilbert Guadalupe with [facility] review as well  CM will continue to follow patient's progress and assist with discharge planning needs

## 2021-04-02 NOTE — PLAN OF CARE
Problem: Nutrition/Hydration-ADULT  Goal: Nutrient/Hydration intake appropriate for improving, restoring or maintaining nutritional needs  Description: Monitor and assess patient's nutrition/hydration status for malnutrition  Collaborate with interdisciplinary team and initiate plan and interventions as ordered  Monitor patient's weight and dietary intake as ordered or per policy  Utilize nutrition screening tool and intervene as necessary  Determine patient's food preferences and provide high-protein, high-caloric foods as appropriate       INTERVENTIONS:  - Monitor oral intake, urinary output, labs, and treatment plans  - Assess nutrition and hydration status and recommend course of action  - Evaluate amount of meals eaten  - Assist patient with eating if necessary   - Allow adequate time for meals  - Recommend/ encourage appropriate diets, oral nutritional supplements, and vitamin/mineral supplements  - Order, calculate, and assess calorie counts as needed  - Recommend, monitor, and adjust tube feedings and TPN/PPN based on assessed needs  - Assess need for intravenous fluids  - Provide specific nutrition/hydration education as appropriate  - Include patient/family/caregiver in decisions related to nutrition  Outcome: Progressing     Problem: SUBSTANCE USE/ABUSE  Goal: Will have no detox symptoms and will verbalize plan for changing substance-related behavior  Description: INTERVENTIONS:  - Monitor physical status and assess for symptoms of withdrawal  - Administer medication as ordered  - Provide emotional support with 1 on 1 interaction with staff  - Encourage recovery focused program/ addiction education  - Assess for verbalization of changing behaviors related to substance abuse  - Initiate consults and referrals as appropriate (Case Management, Spiritual Care, etc )  Outcome: Progressing  Goal: By discharge, will develop insight into their chemical dependency and sustain motivation to continue in recovery  Description: INTERVENTIONS:  - Attends all daily group sessions and scheduled AA groups  - Actively practices coping skills through participation in the therapeutic community and adherence to program rules  - Reviews and completes assignments from individual treatment plan  - Assist patient development of understanding of their personal cycle of addiction and relapse triggers  Outcome: Progressing  Goal: By discharge, patient will have ongoing treatment plan addressing chemical dependency  Description: INTERVENTIONS:  - Assist patient with resources and/or appointments for ongoing recovery based living  Outcome: Progressing     Problem: SLEEP DISTURBANCE  Goal: Will exhibit normal sleeping pattern  Description: Interventions:  -  Assess the patients sleep pattern, noting recent changes  - Administer medication as ordered  - Decrease environmental stimuli, including noise, as appropriate during the night  - Encourage the patient to actively participate in unit groups and or exercise during the day to enhance ability to achieve adequate sleep at night  - Assess the patient, in the morning, encouraging a description of sleep experience  Outcome: Progressing     Problem: SELF CARE DEFICIT  Goal: Return ADL status to a safe level of function  Description: INTERVENTIONS:  - Administer medication as ordered  - Assess ADL deficits and provide assistive devices as needed  - Obtain PT/OT consults as needed  - Assist and instruct patient to increase activity and self care as tolerated  Outcome: Progressing     Problem: Alteration in Thoughts and Perception  Goal: Treatment Goal: Gain control of psychotic behaviors/thinking, reduce/eliminate presenting symptoms and demonstrate improved reality functioning upon discharge  Outcome: Progressing  Goal: Refrain from acting on delusional thinking/internal stimuli  Description: Interventions:  - Monitor patient closely, per order   - Utilize least restrictive measures   - Set reasonable limits, give positive feedback for acceptable   - Administer medications as ordered and monitor of potential side effects  Outcome: Progressing  Goal: Agree to be compliant with medication regime, as prescribed and report medication side effects  Description: Interventions:  - Offer appropriate PRN medication and supervise ingestion; conduct AIMS, as needed   Outcome: Progressing  Goal: Recognize dysfunctional thoughts, communicate reality-based thoughts at the time of discharge  Description: Interventions:  - Provide medication and psycho-education to assist patient in compliance and developing insight into his/her illness   Outcome: Progressing     Problem: Ineffective Coping  Goal: Identifies ineffective coping skills  Outcome: Progressing  Goal: Identifies healthy coping skills  Outcome: Progressing  Goal: Demonstrates healthy coping skills  Outcome: Progressing  Goal: Patient/Family participate in treatment and DC plans  Description: Interventions:  - Provide therapeutic environment  Outcome: Progressing  Goal: Patient/Family verbalizes awareness of resources  Outcome: Progressing  Goal: Understands least restrictive measures  Description: Interventions:  - Utilize least restrictive behavior  Outcome: Progressing  Goal: Free from restraint events  Description: - Utilize least restrictive measures   - Provide behavioral interventions   - Redirect inappropriate behaviors   Outcome: Progressing     Problem: Depression  Goal: Treatment Goal: Demonstrate behavioral control of depressive symptoms, verbalize feelings of improved mood/affect, and adopt new coping skills prior to discharge  Outcome: Progressing  Goal: Refrain from harming self  Description: Interventions:  - Monitor patient closely, per order   - Supervise medication ingestion, monitor effects and side effects   Outcome: Progressing  Goal: Refrain from isolation  Description: Interventions:  - Develop a trusting relationship   - Encourage socialization   Outcome: Progressing  Goal: Refrain from self-neglect  Outcome: Progressing     Problem: Anxiety  Goal: Anxiety is at manageable level  Description: Interventions:  - Assess and monitor patient's anxiety level  - Monitor for signs and symptoms (heart palpitations, chest pain, shortness of breath, headaches, nausea, feeling jumpy, restlessness, irritable, apprehensive)  - Collaborate with interdisciplinary team and initiate plan and interventions as ordered    - [place] patient to unit/surroundings  - Explain treatment plan  - Encourage participation in care  - Encourage verbalization of concerns/fears  - Identify coping mechanisms  - Assist in developing anxiety-reducing skills  - Administer/offer alternative therapies  - Limit or eliminate stimulants  Outcome: Progressing     Problem: DISCHARGE PLANNING - CARE MANAGEMENT  Goal: Discharge to post-acute care or home with appropriate resources  Description: INTERVENTIONS:  - Conduct assessment to determine patient/family and health care team treatment goals, and need for post-acute services based on payer coverage, community resources, and patient preferences, and barriers to discharge  - Address psychosocial, clinical, and financial barriers to discharge as identified in assessment in conjunction with the patient/family and health care team  - Arrange appropriate level of post-acute services according to patients   needs and preference and payer coverage in collaboration with the physician and health care team  - Communicate with and update the patient/family, physician, and health care team regarding progress on the discharge plan  - Arrange appropriate transportation to post-acute venues  Outcome: Progressing     Problem: Ineffective Coping  Goal: Participates in unit activities  Description: Interventions:  - Provide therapeutic environment   - Provide required programming   - Redirect inappropriate behaviors   Outcome: Progressing

## 2021-04-02 NOTE — PROGRESS NOTES
04/02/21 1000   Activity/Group Checklist   Group   ([employer])   Attendance Attended   Attendance Duration (min) 46-60   Interactions Interacted appropriately   Affect/Mood Appropriate   Goals Achieved Identified feelings; Discussed coping strategies; Able to listen to others; Able to engage in interactions

## 2021-04-02 NOTE — PROGRESS NOTES
Continued pacing this AM, loud, occasional yelling  Agreed to take Zyprexa 2 5 mg for mild agitation at 0613  Agitated behavioral scale 28  Will continue to monitor

## 2021-04-02 NOTE — PLAN OF CARE
Problem: DISCHARGE PLANNING - CARE MANAGEMENT  Goal: Discharge to post-acute care or home with appropriate resources  Description: INTERVENTIONS:  - Conduct assessment to determine patient/family and health care team treatment goals, and need for post-acute services based on payer coverage, community resources, and patient preferences, and barriers to discharge  - Address psychosocial, clinical, and financial barriers to discharge as identified in assessment in conjunction with the patient/family and health care team  - Arrange appropriate level of post-acute services according to patients   needs and preference and payer coverage in collaboration with the physician and health care team  - Communicate with and update the patient/family, physician, and health care team regarding progress on the discharge plan  - Arrange appropriate transportation to post-acute venues  Outcome: Progressing     SW called pt's daughter Sol Camilo who speaks with pt, usually on a daily basis, regarding which she perceives pt to need long-term intervention  SW noted that was referred by pt's B[facility] and community support teams for [facility] placement regarding which pt's referral to  [address] placement was made today

## 2021-04-02 NOTE — PROGRESS NOTES
Awake overnight  Multiple request for meds, food and drink  Found in room screaming at unseen person  LORazepam (ATIVAN) tablet 1 mg given at 0051 per request for severe anxiety PO  Wanted Zyprexa at same time  RN informed her of 1 hour policy about the medications be given separately  Agreed to wait  Began pacing in hallway, yelling to self  Requested Zyprexa 5 mg at 0157  Agitation Behavior Scale 36  Given for severe agitation  Medication not effective  Awake all shift  Pacing, talking to self, many incidentals request   Needed redirection during periods of yelling  Appears manipulative at time  Staff splitting noted  Remains awake at present  Maintained on q 7 minute checks

## 2021-04-02 NOTE — PROGRESS NOTES
Progress Note - [address] 53 y o  female MRN: [id number]   Unit/Bed#: U 249-01 Encounter: [id number]    Behavior over the last 24 hours: unchanged  Maury Francisco is a 80-year-old female with a history of schizoaffective disorder bipolar type who presents for psychiatric follow-up  Patient appears somewhat brighter today and does not endorse any symptoms of depression  She is forward thinking and excited to continue her treatment at [facility]'s extended acute care unit, pending acceptance  She continues to have psychotic outbursts, required multiple p r n  medications yesterday  This interviewer personally attempted to deescalate her at around 1600 yesterday afternoon  Provided roughly 20 minutes of psychotherapy, patient was receptive and her psychotic outburst improved  However, she states that she had another nightmare last night and woke up yelling again  She does appear somewhat less paranoid today and her speech is more organized, spontaneous and linear  However, she continues to endorse somatic delusions, today stating that she believes she has a loose tooth that is trapping food in her mouth  She thinks the tooth tightens and loosens on its own  Denies SI or HI at this time  AH is unchanged, no VH      Sleep: nightmares  Appetite: normal  Medication side effects: No   ROS: reports dental pain, all other systems are negative    Mental Status Evaluation:    Appearance:  age appropriate, casually dressed, adequate grooming   Behavior:  cooperative, calm, less agitated, less bizarre   Speech:  pressured, hypertalkative, tangential, still at times disorganized    Mood:  improved, euthymic   Affect:  expansive   Thought Process:  tangential, increased rate of thoughts   Associations: tangential associations   Thought Content:  persecutory and somatic delusions   Perceptual Disturbances: auditory hallucinations, no visual hallucinations   Risk Potential: Suicidal ideation - None at present  Homicidal ideation - None at present  Potential for aggression - No   Sensorium:  oriented to person, place and time/date   Memory:  recent and remote memory grossly intact   Consciousness:  alert and awake   Attention/Concentration: attention span and concentration are age appropriate   Insight:  limited   Judgment: limited   Gait/Station: normal gait/station, normal balance   Motor Activity: no abnormal movements     Vital signs in last 24 hours:    Temp:  [97 °F (36 1 °C)-97 8 °F (36 6 °C)] 97 °F (36 1 °C)  HR:  [74-76] 76  Resp:  [16] 16  BP: (104-112)/(67-76) 104/76    Laboratory results: I have personally reviewed all pertinent laboratory/tests results    Most Recent Labs:   Lab Results   Component Value Date    WBC 9 70 03/16/2021    RBC 4 91 03/16/2021    HGB 14 9 03/16/2021    HCT 45 1 03/16/2021     03/16/2021    RDW 14 1 03/16/2021    NEUTROABS 7 10 (H) 03/16/2021    SODIUM 134 03/17/2021    K 4 0 03/17/2021    CL 99 03/17/2021    CO2 29 03/17/2021    BUN 14 03/17/2021    CREATININE 0 56 (L) 03/17/2021    GLUC 112 (H) 03/17/2021    CALCIUM 9 4 03/17/2021    AST 27 03/17/2021    ALT 18 03/17/2021    ALKPHOS 67 03/17/2021    TP 7 0 03/17/2021    ALB 4 2 03/17/2021    TBILI 0 40 03/17/2021    CHOLESTEROL 300 (H) 03/31/2021    HDL 57 03/31/2021    TRIG 658 (H) 03/31/2021    [address]  03/31/2021      Comment:      Calculated LDL invalid, triglycerides >400 mg/dl    NONHDLC 147 08/23/2020    AMMONIA 28 10/27/2017    DEE2VAJZXFNT 2 190 10/24/2019    FREET4 0 89 03/24/2016    PREGUR negative 03/16/2021    PREGSERUM Negative 10/21/2019    HCG <2 00 04/10/2014    RPR Non-Reactive 03/31/2021    HGBA1C 5 0 08/06/2019    EAG 97 08/06/2019       Progress Toward Goals: fewer psychotic outbursts today, somatic delusions (tooth issue), nightmares worse    Assessment/Plan   Principal Problem:    Schizoaffective disorder, bipolar type ([facility] )  Active Problems:    LYNN (generalized anxiety disorder) Mild intermittent asthma without complication    Tobacco abuse      Recommended Treatment:     Planned medication and treatment changes: All current active medications have been reviewed  Encourage group therapy, milieu therapy and occupational therapy  Behavioral Health checks every 7 minutes    Risperdal formulation changed from oral tablets to dissolvable M tabs, will remain at current dose  No mouth checks necessary  Patient requesting a medication for PTSD related nightmares  Has a history of low-end of normal blood pressure, will need to monitor for hypotension with a medication like Minipress  Do not administer a systolic BP is less than 532 mmHg or diastolic BP is less than 60 mmHg  EAC referral made today, status pending  Case management following      Current Facility-Administered Medications   Medication Dose Route Frequency Provider Last Rate    acetaminophen  650 mg Oral Q6H PRN Anice Moment, DO      acetaminophen  650 mg Oral Q4H PRN Anice Moment, DO      acetaminophen  975 mg Oral Q6H PRN Anice Moment, DO      albuterol  2 puff Inhalation Q6H PRN Anice Moment, DO      benztropine  1 mg Intramuscular Q4H PRN Max 6/day Anice Moment, DO      benztropine  1 mg Oral Q4H PRN Max 6/day Anice Moment, DO      bismuth subsalicylate  695 mg Oral Q6H PRN Dunia Darren, DO      calcium carbonate  500 mg Oral TID PRN Sixto Sepulveda PA-C      clonazePAM  0 5 mg Oral TID JARED Augustin      diphenhydrAMINE  25 mg Oral HS JARED Jones      escitalopram  20 mg Oral Daily Sixto Sepulveda PA-C      fenofibrate  145 mg Oral Daily Laura Fischer MD      gabapentin  600 mg Oral 4x Daily Rima Hung MD      haloperidol  5 mg Oral Q6H PRN Dena Howell MD      hydrOXYzine HCL  25 mg Oral Q6H PRN Max 4/day Anice Moment, DO      hydrOXYzine HCL  50 mg Oral Q6H PRN Max 4/day Anice Moment, DO      lidocaine  1 patch Topical Daily Mark BOYD MD Ryley      LORazepam  2 mg Intramuscular Q8H PRN Andra Sieving, MD      LORazepam  1 mg Oral Q8H PRN Andra Sieving, MD      magnesium hydroxide  30 mL Oral Daily PRN Serenity Chávez PA-C      nicotine  1 patch Transdermal Daily Rayne Nicholson DO      OLANZapine  10 mg Oral Q3H PRN Andra Sieving, MD      OLANZapine  2 5 mg Oral Q8H PRN Andra Sieving, MD      OLANZapine  5 mg Oral Q3H PRN Nadra Sieving, MD      OLANZapine  10 mg Intramuscular Q3H PRN Andra Sieving, MD      OLANZapine  5 mg Intramuscular Q3H PRN Andra Sieving, MD      Pramox-PE-Glycerin-Petrolatum   Rectal 4x Daily PRN JARED Bedolla      risperiDONE  3 mg Oral Daily Serenity Chávez PA-C      risperiDONE  4 mg Oral HS Serenity Chávez PA-C       Risks / Benefits of Treatment:    Risks, benefits, and possible side effects of medications explained to patient and patient verbalizes understanding and agreement for treatment  Counseling / Coordination of Care:    Patient's progress discussed with staff in treatment team meeting  Medications, treatment progress and treatment plan reviewed with patient      Serenity Chávez PA-C 04/02/21

## 2021-04-02 NOTE — PROGRESS NOTES
In room sleeping since 1900  Woke for medications but returned back to sleep  Refused PM snack  No behaviors or suicidal ideations noted  Socialization poor tonight  Maintained on q 7 minute checks  Fluids at bedside to promote hydration  Will continue to monitor

## 2021-04-02 NOTE — PROGRESS NOTES
Patient bright, alert in hallways at arrival of shift  Requested her scheduled medications as soon as seeing this nurse on unit  Pt was understanding when explained to her that it was too early  Took medications as scheduled  2 short yelling outbursts heard from room in late morning  No prns requested this am  Pt was listening to [place] in room and having  Calm and appropriate conversation with  this nurse about music preferences  Will maintain on safety precautions and continual monitoring  No needs identified

## 2021-04-02 NOTE — PROGRESS NOTES
04/02/21 0730   Activity/Group Checklist   Group   (Goal Planning and Communication )   Attendance Attended   Attendance Duration (min) 46-60   Interactions Interacted appropriately   Affect/Mood Appropriate   Goals Achieved Identified feelings; Able to listen to others; Able to engage in interactions

## 2021-04-03 PROCEDURE — 99232 SBSQ HOSP IP/OBS MODERATE 35: CPT | Performed by: PHYSICIAN ASSISTANT

## 2021-04-03 RX ORDER — BENZTROPINE MESYLATE 1 MG/1
1 TABLET ORAL
Status: DISCONTINUED | OUTPATIENT
Start: 2021-04-03 | End: 2021-04-07 | Stop reason: HOSPADM

## 2021-04-03 RX ADMIN — OLANZAPINE 5 MG: 5 TABLET, ORALLY DISINTEGRATING ORAL at 09:21

## 2021-04-03 RX ADMIN — CARBAMIDE PEROXIDE 6.5% 5 DROP: 6.5 LIQUID AURICULAR (OTIC) at 19:32

## 2021-04-03 RX ADMIN — BENZTROPINE MESYLATE 1 MG: 1 TABLET ORAL at 21:17

## 2021-04-03 RX ADMIN — RISPERIDONE 3 MG: 2 TABLET ORAL at 08:17

## 2021-04-03 RX ADMIN — GLYCERIN, PETROLATUM, PHENYLEPHRINE HCL, PRAMOXINE HCL: 144; 2.5; 10; 15 CREAM TOPICAL at 19:33

## 2021-04-03 RX ADMIN — RISPERIDONE 4 MG: 2 TABLET ORAL at 21:16

## 2021-04-03 RX ADMIN — BENZTROPINE MESYLATE 1 MG: 1 TABLET ORAL at 09:21

## 2021-04-03 RX ADMIN — GABAPENTIN 600 MG: 300 CAPSULE ORAL at 08:17

## 2021-04-03 RX ADMIN — FENOFIBRATE 145 MG: 145 TABLET, FILM COATED ORAL at 08:17

## 2021-04-03 RX ADMIN — CLONAZEPAM 0.5 MG: 0.5 TABLET ORAL at 11:35

## 2021-04-03 RX ADMIN — GABAPENTIN 600 MG: 300 CAPSULE ORAL at 11:35

## 2021-04-03 RX ADMIN — ESCITALOPRAM OXALATE 20 MG: 10 TABLET ORAL at 08:16

## 2021-04-03 RX ADMIN — LORAZEPAM 2 MG: 2 INJECTION INTRAMUSCULAR; INTRAVENOUS at 14:12

## 2021-04-03 RX ADMIN — CLONAZEPAM 0.5 MG: 0.5 TABLET ORAL at 08:17

## 2021-04-03 RX ADMIN — NICOTINE 1 PATCH: 21 PATCH, EXTENDED RELEASE TRANSDERMAL at 07:02

## 2021-04-03 RX ADMIN — LIDOCAINE 1 PATCH: 50 PATCH CUTANEOUS at 07:05

## 2021-04-03 RX ADMIN — GABAPENTIN 600 MG: 300 CAPSULE ORAL at 21:16

## 2021-04-03 RX ADMIN — DIPHENHYDRAMINE HCL 25 MG: 25 TABLET ORAL at 21:17

## 2021-04-03 RX ADMIN — GABAPENTIN 600 MG: 300 CAPSULE ORAL at 17:22

## 2021-04-03 RX ADMIN — CLONAZEPAM 0.5 MG: 0.5 TABLET ORAL at 21:17

## 2021-04-03 RX ADMIN — LORAZEPAM 1 MG: 1 TABLET ORAL at 06:28

## 2021-04-03 NOTE — PROGRESS NOTES
Progress Note - [address] 53 y o  female MRN: [id number]   Unit/Bed#: U 249-01 Encounter: [id number]    Behavior over the last 24 hours: unchanged  Lynnette Garnett seen alone in her room  Shows MARTINE stress management videos she is watching on youtube  Admits that she has mental health instability at this time  Woke up at 0500 this morning pacing, demanding and escalating to screaming  Was given ativan 1 mg po  Needed prn zyprexa this morning for agitation, paranoia and hallucinations  Currently she is pleasant, although talkative and circumstantial   Asking for scheduled cogentin at bedtime for muscle cramping in her legs  Is on 7 mg risperdal/d  She says she "fears going insane" and has been struggling with mental health problems for at least 20 yrs  Sleep: early awakening  Appetite: normal  Medication side effects: muscle cramping  ROS: no other complaints    Mental Status Evaluation:    Appearance:  adequate grooming   Behavior:  cooperative   Speech:  hypertalkative   Mood:  anxious   Affect:  increased in intensity   Thought Process:  circumstantial   Associations: circumstantial associations   Thought Content:  no overt delusions   Perceptual Disturbances: does not appear responding to internal stimuli   Risk Potential: Suicidal ideation - None  Homicidal ideation - None   Sensorium:  oriented to person, place and time/date   Memory:  recent and remote memory grossly intact   Consciousness:  alert and awake   Attention: attention span and concentration are age appropriate   Insight:  fair   Judgment: fair   Gait/Station: in bed   Motor Activity: no abnormal movements     Vital signs in last 24 hours:    Temp:  [97 4 °F (36 3 °C)-97 5 °F (36 4 °C)] 97 5 °F (36 4 °C)  HR:  [86-88] 86  Resp:  [16-18] 18  BP: ()/(62-87) 90/62    Laboratory results: I have personally reviewed all pertinent laboratory/tests results          Assessment/Plan   Principal Problem: Schizoaffective disorder, bipolar type ([facility] [address] )  Active Problems:    LYNN (generalized anxiety disorder)    Mild intermittent asthma without complication    Tobacco abuse    Recommended Treatment: cont present treatment    Planned medication and treatment changes: will add scheduled cogentin 1 mg q bedtime for pt c/o muscle cramps -is on 7 mg risperdal      All current active medications have been reviewed  Encourage group therapy, milieu therapy and occupational therapy  Behavioral Health checks every 7 minutes  Current Facility-Administered Medications   Medication Dose Route Frequency Provider Last Rate    acetaminophen  650 mg Oral Q6H PRN Mark Talamantes, DO      acetaminophen  650 mg Oral Q4H PRN Mark Talamantes, DO      acetaminophen  975 mg Oral Q6H PRN Mark Talamantes, DO      albuterol  2 puff Inhalation Q6H PRN Mark Talamantes, DO      benztropine  1 mg Intramuscular Q4H PRN Max 6/day Mark Talamantes, DO      benztropine  1 mg Oral Q4H PRN Max 6/day Mark Talamantes, DO      bismuth subsalicylate  447 mg Oral Q6H PRN Neville Mcdermott, DO      calcium carbonate  500 mg Oral TID PRN Cruzito Dec, PA-C      carbamide peroxide  5 drop Both Ears BID Angela Keita, PA-C      clonazePAM  0 5 mg Oral TID JARED Augustin      diphenhydrAMINE  25 mg Oral HS JARED Jones      escitalopram  20 mg Oral Daily Cruzito Dec, PA-C      fenofibrate  145 mg Oral Daily Ed Chen MD      gabapentin  600 mg Oral 4x Daily Ashlyn Mahajan MD      haloperidol  5 mg Oral Q6H PRN Brandon Clemente MD      hydrOXYzine HCL  25 mg Oral Q6H PRN Max 4/day Mark Talamantes, DO      hydrOXYzine HCL  50 mg Oral Q6H PRN Max 4/day Mark Talamantes, DO      lidocaine  1 patch Topical Daily Ed Chen MD      LORazepam  2 mg Intramuscular Q8H PRN Brandon Clemente MD      LORazepam  1 mg Oral Q8H PRN Brandon Clemente MD      magnesium hydroxide  30 mL Oral Daily PRN Cruzito Dec, MARTINE      nicotine  1 patch Transdermal Daily Marla Hurd DO      OLANZapine  10 mg Oral Q3H PRN Sincere Pinzon MD      OLANZapine  2 5 mg Oral Q8H PRN Sincere Pinzon MD      OLANZapine  5 mg Oral Q3H PRN Sincere Pinzon MD      OLANZapine  10 mg Intramuscular Q3H PRN Sincere Pinzon MD      OLANZapine  5 mg Intramuscular Q3H PRN Sincere Pinzon MD      Pramox-PE-Glycerin-Petrolatum   Rectal 4x Daily PRN JARED Ochoa      prazosin  1 mg Oral HS Kylee Brenna, MARTINE      risperiDONE  3 mg Oral Daily Kylee [place], MARTINE      risperiDONE  4 mg Oral HS Kylee MARTINE Ceja         Risks / Benefits of Treatment:    Risks, benefits, and possible side effects of medications explained to patient and patient verbalizes understanding and agreement for treatment  Counseling / Coordination of Care: Total floor / unit time spent today 15 minutes  Greater than 50% of total time was spent with the patient and / or family counseling and / or coordination of care  A description of counseling / coordination of care:  Patient's progress discussed with staff in treatment team meeting  Medications, treatment progress and treatment plan reviewed with patient      Krista Metz PA-C 04/03/21

## 2021-04-03 NOTE — PROGRESS NOTES
Continues to scream in room  Appears to be yelling at unseen individual   None redirectable  Continuing to monitor effect of given Ativan PO

## 2021-04-03 NOTE — PROGRESS NOTES
Patient awake at 0500  Demanding, needy  Requesting one incidental after another  Pacing in hallway between demands  Takes redirection poorly  Testing limits  Poor focus  Irritable edge

## 2021-04-03 NOTE — PROGRESS NOTES
Patient observed sleeping during most Q 7 minute safety checks  To the nurse's station one time for drink  No yelling out overnight  No SI/HI  noted  Patient shows no s/s of distress  No complaints of pain or aspiration risks  Non-labored breathing  Monitoring continues  Fluids at bedside to promote hydration

## 2021-04-03 NOTE — PROGRESS NOTES
Pt behaviors continued to escalate  Tablet, meditation, jornaling, 1:1 conversation with staff ineffective  Severe anxiety present despite alternate coping skills  Administered 2 mg IM ativan administered in left deltoid  Pt tolerated without complication  Will evaluate effectiveness  Safety precautions maintained  Will continue to monitor and assess

## 2021-04-03 NOTE — PROGRESS NOTES
Pt's mood remains labile  Pt pleasant at times and appears controlled, but then becomes anxious and agitated  Remains paranoid and continues to experience hallucinations  Several yelling outbursts  Compliant with scheduled medications, but requests additional PRN medications  Pt c/o restless legs and agitation  PRN Zyprexa and Cogentin given @ 0921  Pacing the halls  Denies SI/HI at this time  Cooperative with staff  Will continue to monitor and assess

## 2021-04-03 NOTE — PLAN OF CARE
Problem: Nutrition/Hydration-ADULT  Goal: Nutrient/Hydration intake appropriate for improving, restoring or maintaining nutritional needs  Description: Monitor and assess patient's nutrition/hydration status for malnutrition  Collaborate with interdisciplinary team and initiate plan and interventions as ordered  Monitor patient's weight and dietary intake as ordered or per policy  Utilize nutrition screening tool and intervene as necessary  Determine patient's food preferences and provide high-protein, high-caloric foods as appropriate       INTERVENTIONS:  - Monitor oral intake, urinary output, labs, and treatment plans  - Assess nutrition and hydration status and recommend course of action  - Evaluate amount of meals eaten  - Assist patient with eating if necessary   - Allow adequate time for meals  - Recommend/ encourage appropriate diets, oral nutritional supplements, and vitamin/mineral supplements  - Order, calculate, and assess calorie counts as needed  - Recommend, monitor, and adjust tube feedings and TPN/PPN based on assessed needs  - Assess need for intravenous fluids  - Provide specific nutrition/hydration education as appropriate  - Include patient/family/caregiver in decisions related to nutrition  4/3/2021 0950 by Jacque Fuentes RN  Outcome: Progressing  4/3/2021 0950 by Jacque Fuentes RN  Outcome: Progressing     Problem: SUBSTANCE USE/ABUSE  Goal: Will have no detox symptoms and will verbalize plan for changing substance-related behavior  Description: INTERVENTIONS:  - Monitor physical status and assess for symptoms of withdrawal  - Administer medication as ordered  - Provide emotional support with 1 on 1 interaction with staff  - Encourage recovery focused program/ addiction education  - Assess for verbalization of changing behaviors related to substance abuse  - Initiate consults and referrals as appropriate (Case Management, Spiritual Care, etc )  4/3/2021 0950 by Jacque Fuentes RN  Outcome: Progressing  4/3/2021 0950 by Hannah Avila RN  Outcome: Progressing  Goal: By discharge, will develop insight into their chemical dependency and sustain motivation to continue in recovery  Description: INTERVENTIONS:  - Attends all daily group sessions and scheduled AA groups  - Actively practices coping skills through participation in the therapeutic community and adherence to program rules  - Reviews and completes assignments from individual treatment plan  - Assist patient development of understanding of their personal cycle of addiction and relapse triggers  4/3/2021 0950 by Hannah Avila RN  Outcome: Progressing  4/3/2021 0950 by Hannah Avila RN  Outcome: Progressing  Goal: By discharge, patient will have ongoing treatment plan addressing chemical dependency  Description: INTERVENTIONS:  - Assist patient with resources and/or appointments for ongoing recovery based living  4/3/2021 0950 by Hannah Avila RN  Outcome: Progressing  4/3/2021 0950 by Hannah Avila RN  Outcome: Progressing     Problem: SLEEP DISTURBANCE  Goal: Will exhibit normal sleeping pattern  Description: Interventions:  -  Assess the patients sleep pattern, noting recent changes  - Administer medication as ordered  - Decrease environmental stimuli, including noise, as appropriate during the night  - Encourage the patient to actively participate in unit groups and or exercise during the day to enhance ability to achieve adequate sleep at night  - Assess the patient, in the morning, encouraging a description of sleep experience  4/3/2021 0950 by Hannah Avila RN  Outcome: Progressing  4/3/2021 0950 by Hannah Avila RN  Outcome: Progressing     Problem: SELF CARE DEFICIT  Goal: Return ADL status to a safe level of function  Description: INTERVENTIONS:  - Administer medication as ordered  - Assess ADL deficits and provide assistive devices as needed  - Obtain PT/OT consults as needed  - Assist and instruct patient to increase activity and self care as tolerated  4/3/2021 0950 by Jace Turner RN  Outcome: Progressing  4/3/2021 0950 by Jace Turner RN  Outcome: Progressing     Problem: Alteration in Thoughts and Perception  Goal: Refrain from acting on delusional thinking/internal stimuli  Description: Interventions:  - Monitor patient closely, per order   - Utilize least restrictive measures   - Set reasonable limits, give positive feedback for acceptable   - Administer medications as ordered and monitor of potential side effects  4/3/2021 0950 by Jace Turner RN  Outcome: Not Progressing  4/3/2021 0950 by Jace Turner RN  Outcome: Progressing  Goal: Agree to be compliant with medication regime, as prescribed and report medication side effects  Description: Interventions:  - Offer appropriate PRN medication and supervise ingestion; conduct AIMS, as needed   4/3/2021 0950 by Jace Turner RN  Outcome: Progressing  4/3/2021 0950 by Jace Turner RN  Outcome: Progressing  Goal: Recognize dysfunctional thoughts, communicate reality-based thoughts at the time of discharge  Description: Interventions:  - Provide medication and psycho-education to assist patient in compliance and developing insight into his/her illness   4/3/2021 0950 by Jace Turner RN  Outcome: Not Progressing  4/3/2021 0950 by Jace Turner RN  Outcome: Progressing     Problem: Ineffective Coping  Goal: Identifies ineffective coping skills  4/3/2021 0950 by Jace Turner RN  Outcome: Progressing  4/3/2021 0950 by Jace Turner RN  Outcome: Progressing  Goal: Identifies healthy coping skills  4/3/2021 0950 by Jace Turner RN  Outcome: Progressing  4/3/2021 0950 by Jace Turner RN  Outcome: Progressing  Goal: Demonstrates healthy coping skills  4/3/2021 0950 by Jace Turner RN  Outcome: Progressing  4/3/2021 0950 by Jace Turner RN  Outcome: Progressing  Goal: Patient/Family participate in treatment and DC plans  Description: Interventions:  - Provide therapeutic environment  4/3/2021 [id number] by Marsha Min RN  Outcome: Progressing  4/3/2021 0950 by Marsha Min RN  Outcome: Progressing  Goal: Patient/Family verbalizes awareness of resources  4/3/2021 [id number] by Marsha Min RN  Outcome: Progressing  4/3/2021 0950 by Marsha Min RN  Outcome: Progressing  Goal: Understands least restrictive measures  Description: Interventions:  - Utilize least restrictive behavior  4/3/2021 0950 by Marsha Min RN  Outcome: Progressing  4/3/2021 0950 by Marsha Min RN  Outcome: Progressing  Goal: Free from restraint events  Description: - Utilize least restrictive measures   - Provide behavioral interventions   - Redirect inappropriate behaviors   4/3/2021 0950 by Marsha Min RN  Outcome: Progressing  4/3/2021 0950 by Marsha Min RN  Outcome: Progressing     Problem: Depression  Goal: Refrain from harming self  Description: Interventions:  - Monitor patient closely, per order   - Supervise medication ingestion, monitor effects and side effects   4/3/2021 0950 by Marsha Min RN  Outcome: Progressing  4/3/2021 0950 by Masrha Min RN  Outcome: Progressing  Goal: Refrain from isolation  Description: Interventions:  - Develop a trusting relationship   - Encourage socialization   4/3/2021 0950 by Marsha Min RN  Outcome: Progressing  4/3/2021 0950 by Marsha Min RN  Outcome: Progressing     Problem: Anxiety  Goal: Anxiety is at manageable level  Description: Interventions:  - Assess and monitor patient's anxiety level  - Monitor for signs and symptoms (heart palpitations, chest pain, shortness of breath, headaches, nausea, feeling jumpy, restlessness, irritable, apprehensive)  - Collaborate with interdisciplinary team and initiate plan and interventions as ordered    - [place] patient to unit/surroundings  - Explain treatment plan  - Encourage participation in care  - Encourage verbalization of concerns/fears  - Identify coping mechanisms  - Assist in developing anxiety-reducing skills  - Administer/offer alternative therapies  - Limit or eliminate stimulants  4/3/2021 0950 by Roger Cruz RN  Outcome: Progressing  4/3/2021 0950 by Roger Cruz RN  Outcome: Progressing     Problem: Ineffective Coping  Goal: Participates in unit activities  Description: Interventions:  - Provide therapeutic environment   - Provide required programming   - Redirect inappropriate behaviors   4/3/2021 0950 by Roger Cruz RN  Outcome: Progressing  4/3/2021 0950 by Roger Cruz RN  Outcome: Progressing     Problem: DISCHARGE PLANNING - CARE MANAGEMENT  Goal: Discharge to post-acute care or home with appropriate resources  Description: INTERVENTIONS:  - Conduct assessment to determine patient/family and health care team treatment goals, and need for post-acute services based on payer coverage, community resources, and patient preferences, and barriers to discharge  - Address psychosocial, clinical, and financial barriers to discharge as identified in assessment in conjunction with the patient/family and health care team  - Arrange appropriate level of post-acute services according to patients   needs and preference and payer coverage in collaboration with the physician and health care team  - Communicate with and update the patient/family, physician, and health care team regarding progress on the discharge plan  - Arrange appropriate transportation to post-acute venues  4/3/2021 0950 by Roger Cruz RN  Outcome: Progressing  4/3/2021 0950 by Roger Cruz RN  Outcome: Progressing

## 2021-04-03 NOTE — PROGRESS NOTES
Martines Scale 38 at 2621,  Screaming in hallway, room, unable to redirect  Agree to take Ativan 1 mg at this time PO  Yelling continues in room  Wants to be left alone  Given iPad to distract patient  Will monitor

## 2021-04-04 LAB
BILIRUB UR QL STRIP: NEGATIVE
CLARITY UR: CLEAR
COLOR UR: YELLOW
GLUCOSE UR STRIP-MCNC: NEGATIVE MG/DL
HGB UR QL STRIP.AUTO: NEGATIVE
KETONES UR STRIP-MCNC: NEGATIVE MG/DL
LEUKOCYTE ESTERASE UR QL STRIP: NEGATIVE
NITRITE UR QL STRIP: NEGATIVE
PH UR STRIP.AUTO: 5.5 [PH]
PROT UR STRIP-MCNC: NEGATIVE MG/DL
SP GR UR STRIP.AUTO: 1.02 (ref 1–1.03)
UROBILINOGEN UR QL STRIP.AUTO: 0.2 E.U./DL

## 2021-04-04 PROCEDURE — 81003 URINALYSIS AUTO W/O SCOPE: CPT | Performed by: PHYSICIAN ASSISTANT

## 2021-04-04 PROCEDURE — 99232 SBSQ HOSP IP/OBS MODERATE 35: CPT | Performed by: PHYSICIAN ASSISTANT

## 2021-04-04 RX ADMIN — NICOTINE 1 PATCH: 21 PATCH, EXTENDED RELEASE TRANSDERMAL at 08:01

## 2021-04-04 RX ADMIN — CARBAMIDE PEROXIDE 6.5% 5 DROP: 6.5 LIQUID AURICULAR (OTIC) at 08:02

## 2021-04-04 RX ADMIN — CLONAZEPAM 0.5 MG: 0.5 TABLET ORAL at 12:56

## 2021-04-04 RX ADMIN — GABAPENTIN 600 MG: 300 CAPSULE ORAL at 21:53

## 2021-04-04 RX ADMIN — ESCITALOPRAM OXALATE 20 MG: 10 TABLET ORAL at 08:00

## 2021-04-04 RX ADMIN — DIPHENHYDRAMINE HCL 25 MG: 25 TABLET ORAL at 21:53

## 2021-04-04 RX ADMIN — GABAPENTIN 600 MG: 300 CAPSULE ORAL at 16:48

## 2021-04-04 RX ADMIN — GABAPENTIN 600 MG: 300 CAPSULE ORAL at 08:01

## 2021-04-04 RX ADMIN — BENZTROPINE MESYLATE 1 MG: 1 TABLET ORAL at 21:59

## 2021-04-04 RX ADMIN — ALBUTEROL SULFATE 2 PUFF: 90 AEROSOL, METERED RESPIRATORY (INHALATION) at 16:48

## 2021-04-04 RX ADMIN — FENOFIBRATE 145 MG: 145 TABLET, FILM COATED ORAL at 08:00

## 2021-04-04 RX ADMIN — OLANZAPINE 10 MG: 10 TABLET, ORALLY DISINTEGRATING ORAL at 06:16

## 2021-04-04 RX ADMIN — RISPERIDONE 4 MG: 2 TABLET ORAL at 21:52

## 2021-04-04 RX ADMIN — LORAZEPAM 1 MG: 1 TABLET ORAL at 05:15

## 2021-04-04 RX ADMIN — GABAPENTIN 600 MG: 300 CAPSULE ORAL at 12:56

## 2021-04-04 RX ADMIN — RISPERIDONE 3 MG: 2 TABLET ORAL at 08:00

## 2021-04-04 RX ADMIN — CLONAZEPAM 0.5 MG: 0.5 TABLET ORAL at 08:00

## 2021-04-04 RX ADMIN — CLONAZEPAM 0.5 MG: 0.5 TABLET ORAL at 21:53

## 2021-04-04 RX ADMIN — LIDOCAINE 1 PATCH: 50 PATCH CUTANEOUS at 08:01

## 2021-04-04 RX ADMIN — OLANZAPINE 10 MG: 10 TABLET, ORALLY DISINTEGRATING ORAL at 13:12

## 2021-04-04 RX ADMIN — LORAZEPAM 2 MG: 2 INJECTION INTRAMUSCULAR; INTRAVENOUS at 14:52

## 2021-04-04 NOTE — PROGRESS NOTES
Ativan ineffective  Patient continues to scream and pace, agitation scale of 45  Patient given 10 mg zyprexa at 0616  Content is paranoid and disorganized  States she is too damaged from past hx of drug use and wondering how long it will take to become stable  Given reassurance and redirection  Will remain on safety precautions and continual monitoring

## 2021-04-04 NOTE — PROGRESS NOTES
Patient observed asleep throughout majority of the night  No signs/symptoms of distress displayed, non-labored breathing noted  No behaviors observed  Patient now awake and alert  Will remain on safety precautions and continual monitoring

## 2021-04-04 NOTE — PLAN OF CARE
Problem: Nutrition/Hydration-ADULT  Goal: Nutrient/Hydration intake appropriate for improving, restoring or maintaining nutritional needs  Description: Monitor and assess patient's nutrition/hydration status for malnutrition  Collaborate with interdisciplinary team and initiate plan and interventions as ordered  Monitor patient's weight and dietary intake as ordered or per policy  Utilize nutrition screening tool and intervene as necessary  Determine patient's food preferences and provide high-protein, high-caloric foods as appropriate       INTERVENTIONS:  - Monitor oral intake, urinary output, labs, and treatment plans  - Assess nutrition and hydration status and recommend course of action  - Evaluate amount of meals eaten  - Assist patient with eating if necessary   - Allow adequate time for meals  - Recommend/ encourage appropriate diets, oral nutritional supplements, and vitamin/mineral supplements  - Order, calculate, and assess calorie counts as needed  - Recommend, monitor, and adjust tube feedings and TPN/PPN based on assessed needs  - Assess need for intravenous fluids  - Provide specific nutrition/hydration education as appropriate  - Include patient/family/caregiver in decisions related to nutrition  Outcome: Progressing     Problem: SUBSTANCE USE/ABUSE  Goal: Will have no detox symptoms and will verbalize plan for changing substance-related behavior  Description: INTERVENTIONS:  - Monitor physical status and assess for symptoms of withdrawal  - Administer medication as ordered  - Provide emotional support with 1 on 1 interaction with staff  - Encourage recovery focused program/ addiction education  - Assess for verbalization of changing behaviors related to substance abuse  - Initiate consults and referrals as appropriate (Case Management, Spiritual Care, etc )  Outcome: Progressing  Goal: By discharge, will develop insight into their chemical dependency and sustain motivation to continue in recovery  Description: INTERVENTIONS:  - Attends all daily group sessions and scheduled AA groups  - Actively practices coping skills through participation in the therapeutic community and adherence to program rules  - Reviews and completes assignments from individual treatment plan  - Assist patient development of understanding of their personal cycle of addiction and relapse triggers  Outcome: Progressing  Goal: By discharge, patient will have ongoing treatment plan addressing chemical dependency  Description: INTERVENTIONS:  - Assist patient with resources and/or appointments for ongoing recovery based living  Outcome: Progressing     Problem: SLEEP DISTURBANCE  Goal: Will exhibit normal sleeping pattern  Description: Interventions:  -  Assess the patients sleep pattern, noting recent changes  - Administer medication as ordered  - Decrease environmental stimuli, including noise, as appropriate during the night  - Encourage the patient to actively participate in unit groups and or exercise during the day to enhance ability to achieve adequate sleep at night  - Assess the patient, in the morning, encouraging a description of sleep experience  Outcome: Progressing     Problem: SELF CARE DEFICIT  Goal: Return ADL status to a safe level of function  Description: INTERVENTIONS:  - Administer medication as ordered  - Assess ADL deficits and provide assistive devices as needed  - Obtain PT/OT consults as needed  - Assist and instruct patient to increase activity and self care as tolerated  Outcome: Progressing     Problem: Alteration in Thoughts and Perception  Goal: Refrain from acting on delusional thinking/internal stimuli  Description: Interventions:  - Monitor patient closely, per order   - Utilize least restrictive measures   - Set reasonable limits, give positive feedback for acceptable   - Administer medications as ordered and monitor of potential side effects  Outcome: Not Progressing  Goal: Agree to be compliant with medication regime, as prescribed and report medication side effects  Description: Interventions:  - Offer appropriate PRN medication and supervise ingestion; conduct AIMS, as needed   Outcome: Progressing  Goal: Recognize dysfunctional thoughts, communicate reality-based thoughts at the time of discharge  Description: Interventions:  - Provide medication and psycho-education to assist patient in compliance and developing insight into his/her illness   Outcome: Not Progressing     Problem: Ineffective Coping  Goal: Identifies ineffective coping skills  Outcome: Progressing  Goal: Identifies healthy coping skills  Outcome: Progressing  Goal: Demonstrates healthy coping skills  Outcome: Progressing  Goal: Patient/Family participate in treatment and DC plans  Description: Interventions:  - Provide therapeutic environment  Outcome: Progressing  Goal: Patient/Family verbalizes awareness of resources  Outcome: Progressing  Goal: Understands least restrictive measures  Description: Interventions:  - Utilize least restrictive behavior  Outcome: Progressing  Goal: Free from restraint events  Description: - Utilize least restrictive measures   - Provide behavioral interventions   - Redirect inappropriate behaviors   Outcome: Progressing     Problem: Depression  Goal: Refrain from harming self  Description: Interventions:  - Monitor patient closely, per order   - Supervise medication ingestion, monitor effects and side effects   Outcome: Progressing  Goal: Refrain from isolation  Description: Interventions:  - Develop a trusting relationship   - Encourage socialization   Outcome: Progressing     Problem: Anxiety  Goal: Anxiety is at manageable level  Description: Interventions:  - Assess and monitor patient's anxiety level  - Monitor for signs and symptoms (heart palpitations, chest pain, shortness of breath, headaches, nausea, feeling jumpy, restlessness, irritable, apprehensive)     - Collaborate with interdisciplinary team and initiate plan and interventions as ordered    - [place] patient to unit/surroundings  - Explain treatment plan  - Encourage participation in care  - Encourage verbalization of concerns/fears  - Identify coping mechanisms  - Assist in developing anxiety-reducing skills  - Administer/offer alternative therapies  - Limit or eliminate stimulants  Outcome: Progressing     Problem: DISCHARGE PLANNING - CARE MANAGEMENT  Goal: Discharge to post-acute care or home with appropriate resources  Description: INTERVENTIONS:  - Conduct assessment to determine patient/family and health care team treatment goals, and need for post-acute services based on payer coverage, community resources, and patient preferences, and barriers to discharge  - Address psychosocial, clinical, and financial barriers to discharge as identified in assessment in conjunction with the patient/family and health care team  - Arrange appropriate level of post-acute services according to patients   needs and preference and payer coverage in collaboration with the physician and health care team  - Communicate with and update the patient/family, physician, and health care team regarding progress on the discharge plan  - Arrange appropriate transportation to post-acute venues  Outcome: Progressing

## 2021-04-04 NOTE — PROGRESS NOTES
Patient continues to be labile this evening  Loud and disruptive at the start of shift, screaming as loud as she could and responding to internal stimuli  She was able to de-escalate and apologize for her behaviors  Given debrox and preparation H, and was very grateful  She denies SI/HI  Medication compliant  Requested to wait for UA until the morning  Withdrawn to her room for most of the evening  Speech continues to be disorganized and somewhat nonsensical at times, and she's often blaming others for her outbursts  Given copious reassurance  Denies other needs at this time  Will remain on safety precautions and continual monitoring

## 2021-04-04 NOTE — PROGRESS NOTES
Patient becoming severely anxious due to internal stimuli  Patient given 1 mg ativan  Now heard screaming at herself in her room  Encouraged to use coping skills to de-escalate while waiting for medication to become effective  Will remain on safety precautions and continual monitoring

## 2021-04-04 NOTE — PROGRESS NOTES
PRN Zyprexa was ineffective for the pt  Pt continued to yell, scream, and curse as she paced the halls  Agitated, irritable, anxious, and restless  Pt requested to have an IM of Ativan  IM Ativan 2 mg given @ 8054    Pt continued to scream and yell and stated "tell them to leave me the fuck alone, I fucking hate them "

## 2021-04-04 NOTE — PROGRESS NOTES
Pt's mood remains extremely labile  Rapid mood shifts  Screaming in her room and yelling "get away from me, I hate you "  No one is in pt's room  Pt storms out of her room telling staff that she is pressing charges against us  Very delusional, paranoid, and suspicious  Compliant with medications, but is somatically preoccupied and is constantly asking for PRN's  There does not seem to be any improvement in pt's behaviors and appears that her mood lability is getting worse  Denies SI/HI at this time  For the most part is cooperative with staff  Will continue to monitor and assess

## 2021-04-04 NOTE — PROGRESS NOTES
Progress Note - [address] 53 y o  female MRN: [id number]   Unit/Bed#: U 249-01 Encounter: [id number]    Behavior over the last 24 hours: unchanged  Sylvia Bowers seen alone in her room  Needed prn ativan and zyprexa 10 mg this am for agitation and paranoia  This afternoon states she feels better- calmer and more relaxed  Denies distressing thoughts or hallucinations  Denies any leg cramps  Sleep: early awakening  Appetite: good  Medication side effects: denies  ROS: no complaints    Mental Status Evaluation:    Appearance:  adequate grooming   Behavior:  pleasant, cooperative   Speech:  normal rate and volume   Mood:  improved   Affect:  brighter   Thought Process:  goal directed   Associations: intact associations   Thought Content:  no overt delusions   Perceptual Disturbances: denies auditory hallucinations when asked   Risk Potential: Suicidal ideation - None  Homicidal ideation - None   Sensorium:  oriented to person, place and time/date   Memory:  recent and remote memory grossly intact   Consciousness:  alert and awake   Attention: attention span and concentration are age appropriate   Insight:  fair   Judgment: fair   Gait/Station: in bed   Motor Activity: no abnormal movements     Vital signs in last 24 hours:    Temp:  [97 5 °F (36 4 °C)-97 7 °F (36 5 °C)] 97 5 °F (36 4 °C)  HR:  [79-81] 79  Resp:  [18] 18  BP: (105-109)/(68-81) 109/73    Laboratory results: I have personally reviewed all pertinent laboratory/tests results  Assessment/Plan   Principal Problem:    Schizoaffective disorder, bipolar type (HCC)  Active Problems:    LYNN (generalized anxiety disorder)    Mild intermittent asthma without complication    Tobacco abuse    Recommended Treatment: cont current treatment  Planned medication and treatment changes: None at this time- consider changing risperdal to zyprexa       All current active medications have been reviewed  Encourage group therapy, milieu therapy and occupational therapy  Behavioral Health checks every 7 minutes  Current Facility-Administered Medications   Medication Dose Route Frequency Provider Last Rate    acetaminophen  650 mg Oral Q6H PRN Saundra Landsberg, DO      acetaminophen  650 mg Oral Q4H PRN Saundra Landsberg, DO      acetaminophen  975 mg Oral Q6H PRN Saundra Landsberg, DO      albuterol  2 puff Inhalation Q6H PRN Saundra Landsberg, DO      benztropine  1 mg Intramuscular Q4H PRN Max 6/day Saundra Landsberg, DO      benztropine  1 mg Oral Q4H PRN Max 6/day Saundra Landsberg, DO      benztropine  1 mg Oral HS Krista Metz PA-C      bismuth subsalicylate  504 mg Oral Q6H PRN Greta Blazer, DO      calcium carbonate  500 mg Oral TID PRN Kylee Brenna, PA-C      carbamide peroxide  5 drop Both Ears BID Geeta Sanford PA-C      clonazePAM  0 5 mg Oral TID JARED Augustin      diphenhydrAMINE  25 mg Oral HS JARED Jones      escitalopram  20 mg Oral Daily Kylee Brenna, PA-C      fenofibrate  145 mg Oral Daily Rafal Bazzi MD      gabapentin  600 mg Oral 4x Daily Jayne Laguna MD      haloperidol  5 mg Oral Q6H PRN Sincere Pinzon MD      hydrOXYzine HCL  25 mg Oral Q6H PRN Max 4/day Saundra Landsberg, DO      hydrOXYzine HCL  50 mg Oral Q6H PRN Max 4/day Saundra Landsberg, DO      lidocaine  1 patch Topical Daily Rafal Bazzi MD      LORazepam  2 mg Intramuscular Q8H PRN Sincere Pinzon MD      LORazepam  1 mg Oral Q8H PRN Sincere Pinzon MD      magnesium hydroxide  30 mL Oral Daily PRN Kylee Hew, PA-C      nicotine  1 patch Transdermal Daily Saundra Landsberg, DO      OLANZapine  10 mg Oral Q3H PRN Sincere Pinzon MD      OLANZapine  2 5 mg Oral Q8H PRN Sincere Pinzon MD      OLANZapine  5 mg Oral Q3H PRN Sincere Pinzon MD      OLANZapine  10 mg Intramuscular Q3H PRN Sincere Pinzon MD      OLANZapine  5 mg Intramuscular Q3H PRN Sincere Pinzon MD     [facility] Pramox-PE-Glycerin-Petrolatum   Rectal 4x Daily PRN JARED Alvarez      prazosin  1 mg Oral HS Sixto Sepulveda PA-C      risperiDONE  3 mg Oral Daily Sixto Sepulveda PA-C      risperiDONE  4 mg Oral HS Sixto Sepulveda PA-C         Risks / Benefits of Treatment:    Risks, benefits, and possible side effects of medications explained to patient and patient verbalizes understanding and agreement for treatment  Counseling / Coordination of Care: Total floor / unit time spent today 15 minutes  Greater than 50% of total time was spent with the patient and / or family counseling and / or coordination of care  A description of counseling / coordination of care:  Patient's progress discussed with staff in treatment team meeting  Medications, treatment progress and treatment plan reviewed with patient      Cyndi Parks PA-C 04/04/21

## 2021-04-05 PROCEDURE — 99232 SBSQ HOSP IP/OBS MODERATE 35: CPT | Performed by: NURSE PRACTITIONER

## 2021-04-05 RX ADMIN — DIPHENHYDRAMINE HCL 25 MG: 25 TABLET ORAL at 20:54

## 2021-04-05 RX ADMIN — GABAPENTIN 600 MG: 300 CAPSULE ORAL at 08:36

## 2021-04-05 RX ADMIN — OLANZAPINE 10 MG: 10 TABLET, ORALLY DISINTEGRATING ORAL at 15:00

## 2021-04-05 RX ADMIN — CARBAMIDE PEROXIDE 6.5% 5 DROP: 6.5 LIQUID AURICULAR (OTIC) at 08:37

## 2021-04-05 RX ADMIN — GABAPENTIN 600 MG: 300 CAPSULE ORAL at 12:36

## 2021-04-05 RX ADMIN — CLONAZEPAM 0.5 MG: 0.5 TABLET ORAL at 12:36

## 2021-04-05 RX ADMIN — ESCITALOPRAM OXALATE 20 MG: 10 TABLET ORAL at 08:36

## 2021-04-05 RX ADMIN — ALBUTEROL SULFATE 2 PUFF: 90 AEROSOL, METERED RESPIRATORY (INHALATION) at 08:43

## 2021-04-05 RX ADMIN — RISPERIDONE 4 MG: 2 TABLET ORAL at 20:53

## 2021-04-05 RX ADMIN — CARBAMIDE PEROXIDE 6.5% 5 DROP: 6.5 LIQUID AURICULAR (OTIC) at 17:19

## 2021-04-05 RX ADMIN — LIDOCAINE 1 PATCH: 50 PATCH CUTANEOUS at 08:36

## 2021-04-05 RX ADMIN — CLONAZEPAM 0.5 MG: 0.5 TABLET ORAL at 20:53

## 2021-04-05 RX ADMIN — GABAPENTIN 600 MG: 300 CAPSULE ORAL at 20:53

## 2021-04-05 RX ADMIN — LORAZEPAM 2 MG: 2 INJECTION INTRAMUSCULAR; INTRAVENOUS at 02:56

## 2021-04-05 RX ADMIN — GABAPENTIN 600 MG: 300 CAPSULE ORAL at 17:19

## 2021-04-05 RX ADMIN — PRAZOSIN HYDROCHLORIDE 1 MG: 1 CAPSULE ORAL at 20:53

## 2021-04-05 RX ADMIN — OLANZAPINE 10 MG: 10 TABLET, ORALLY DISINTEGRATING ORAL at 01:41

## 2021-04-05 RX ADMIN — CLONAZEPAM 0.5 MG: 0.5 TABLET ORAL at 08:36

## 2021-04-05 RX ADMIN — BENZTROPINE MESYLATE 1 MG: 1 TABLET ORAL at 15:01

## 2021-04-05 RX ADMIN — RISPERIDONE 3 MG: 2 TABLET ORAL at 08:36

## 2021-04-05 RX ADMIN — NICOTINE 1 PATCH: 21 PATCH, EXTENDED RELEASE TRANSDERMAL at 08:37

## 2021-04-05 RX ADMIN — FENOFIBRATE 145 MG: 145 TABLET, FILM COATED ORAL at 08:36

## 2021-04-05 RX ADMIN — BENZTROPINE MESYLATE 1 MG: 1 TABLET ORAL at 20:54

## 2021-04-05 NOTE — PROGRESS NOTES
Patient withdrawn to her room for the entirety of the evening  Behaviors and hallucinations controlled at this time  Patient continues to express paranoia and hostility intermittently  Labile  Overheard stating that everyone is out to get her  Medication compliant  No SI/HI endorsed  Will remain on safety precautions and continual monitoring

## 2021-04-05 NOTE — PROGRESS NOTES
04/05/21 1000   Activity/Group Checklist   Group   (Art Therapy)   Attendance Attended   Attendance Duration (min) 0-15   Interactions Interacted appropriately   Affect/Mood Appropriate;Bright   Goals Achieved Increased hopefulness;Verbalized increased hopefulness; Able to self-disclose

## 2021-04-05 NOTE — PLAN OF CARE
Problem: Nutrition/Hydration-ADULT  Goal: Nutrient/Hydration intake appropriate for improving, restoring or maintaining nutritional needs  Description: Monitor and assess patient's nutrition/hydration status for malnutrition  Collaborate with interdisciplinary team and initiate plan and interventions as ordered  Monitor patient's weight and dietary intake as ordered or per policy  Utilize nutrition screening tool and intervene as necessary  Determine patient's food preferences and provide high-protein, high-caloric foods as appropriate       INTERVENTIONS:  - Monitor oral intake, urinary output, labs, and treatment plans  - Assess nutrition and hydration status and recommend course of action  - Evaluate amount of meals eaten  - Assist patient with eating if necessary   - Allow adequate time for meals  - Recommend/ encourage appropriate diets, oral nutritional supplements, and vitamin/mineral supplements  - Order, calculate, and assess calorie counts as needed  - Recommend, monitor, and adjust tube feedings and TPN/PPN based on assessed needs  - Assess need for intravenous fluids  - Provide specific nutrition/hydration education as appropriate  - Include patient/family/caregiver in decisions related to nutrition  Outcome: Progressing     Problem: SUBSTANCE USE/ABUSE  Goal: Will have no detox symptoms and will verbalize plan for changing substance-related behavior  Description: INTERVENTIONS:  - Monitor physical status and assess for symptoms of withdrawal  - Administer medication as ordered  - Provide emotional support with 1 on 1 interaction with staff  - Encourage recovery focused program/ addiction education  - Assess for verbalization of changing behaviors related to substance abuse  - Initiate consults and referrals as appropriate (Case Management, Spiritual Care, etc )  Outcome: Progressing  Goal: By discharge, will develop insight into their chemical dependency and sustain motivation to continue in recovery  Description: INTERVENTIONS:  - Attends all daily group sessions and scheduled AA groups  - Actively practices coping skills through participation in the therapeutic community and adherence to program rules  - Reviews and completes assignments from individual treatment plan  - Assist patient development of understanding of their personal cycle of addiction and relapse triggers  Outcome: Progressing  Goal: By discharge, patient will have ongoing treatment plan addressing chemical dependency  Description: INTERVENTIONS:  - Assist patient with resources and/or appointments for ongoing recovery based living  Outcome: Progressing     Problem: SLEEP DISTURBANCE  Goal: Will exhibit normal sleeping pattern  Description: Interventions:  -  Assess the patients sleep pattern, noting recent changes  - Administer medication as ordered  - Decrease environmental stimuli, including noise, as appropriate during the night  - Encourage the patient to actively participate in unit groups and or exercise during the day to enhance ability to achieve adequate sleep at night  - Assess the patient, in the morning, encouraging a description of sleep experience  Outcome: Progressing     Problem: SELF CARE DEFICIT  Goal: Return ADL status to a safe level of function  Description: INTERVENTIONS:  - Administer medication as ordered  - Assess ADL deficits and provide assistive devices as needed  - Obtain PT/OT consults as needed  - Assist and instruct patient to increase activity and self care as tolerated  Outcome: Progressing     Problem: Alteration in Thoughts and Perception  Goal: Refrain from acting on delusional thinking/internal stimuli  Description: Interventions:  - Monitor patient closely, per order   - Utilize least restrictive measures   - Set reasonable limits, give positive feedback for acceptable   - Administer medications as ordered and monitor of potential side effects  Outcome: Not Progressing  Goal: Agree to be compliant with medication regime, as prescribed and report medication side effects  Description: Interventions:  - Offer appropriate PRN medication and supervise ingestion; conduct AIMS, as needed   Outcome: Progressing  Goal: Recognize dysfunctional thoughts, communicate reality-based thoughts at the time of discharge  Description: Interventions:  - Provide medication and psycho-education to assist patient in compliance and developing insight into his/her illness   Outcome: Not Progressing     Problem: Ineffective Coping  Goal: Identifies ineffective coping skills  Outcome: Progressing  Goal: Identifies healthy coping skills  Outcome: Progressing  Goal: Demonstrates healthy coping skills  Outcome: Progressing  Goal: Patient/Family participate in treatment and DC plans  Description: Interventions:  - Provide therapeutic environment  Outcome: Progressing  Goal: Patient/Family verbalizes awareness of resources  Outcome: Progressing  Goal: Understands least restrictive measures  Description: Interventions:  - Utilize least restrictive behavior  Outcome: Progressing  Goal: Free from restraint events  Description: - Utilize least restrictive measures   - Provide behavioral interventions   - Redirect inappropriate behaviors   Outcome: Progressing     Problem: Depression  Goal: Refrain from harming self  Description: Interventions:  - Monitor patient closely, per order   - Supervise medication ingestion, monitor effects and side effects   Outcome: Progressing  Goal: Refrain from isolation  Description: Interventions:  - Develop a trusting relationship   - Encourage socialization   Outcome: Progressing     Problem: Anxiety  Goal: Anxiety is at manageable level  Description: Interventions:  - Assess and monitor patient's anxiety level  - Monitor for signs and symptoms (heart palpitations, chest pain, shortness of breath, headaches, nausea, feeling jumpy, restlessness, irritable, apprehensive)     - Collaborate with interdisciplinary team and initiate plan and interventions as ordered    - Gallagher patient to unit/surroundings  - Explain treatment plan  - Encourage participation in care  - Encourage verbalization of concerns/fears  - Identify coping mechanisms  - Assist in developing anxiety-reducing skills  - Administer/offer alternative therapies  - Limit or eliminate stimulants  Outcome: Not Progressing     Problem: Ineffective Coping  Goal: Participates in unit activities  Description: Interventions:  - Provide therapeutic environment   - Provide required programming   - Redirect inappropriate behaviors   Outcome: Progressing     Problem: DISCHARGE PLANNING - CARE MANAGEMENT  Goal: Discharge to post-acute care or home with appropriate resources  Description: INTERVENTIONS:  - Conduct assessment to determine patient/family and health care team treatment goals, and need for post-acute services based on payer coverage, community resources, and patient preferences, and barriers to discharge  - Address psychosocial, clinical, and financial barriers to discharge as identified in assessment in conjunction with the patient/family and health care team  - Arrange appropriate level of post-acute services according to patients   needs and preference and payer coverage in collaboration with the physician and health care team  - Communicate with and update the patient/family, physician, and health care team regarding progress on the discharge plan  - Arrange appropriate transportation to post-acute venues  Outcome: Progressing

## 2021-04-05 NOTE — PROGRESS NOTES
Patient c/o restlessness and increased agitation, not being able to fall back asleep and responding to internal stimuli  Patient given 10 mg zyprexa PO  Will continue to monitor and assess for effectiveness

## 2021-04-05 NOTE — CASE MANAGEMENT
Patient is accepted for admission to [address] [place] [address]  Pt will need a COVID test and prior auth before transfer  Transfer to be arranged for 4/6/21 late afternoon or 4/7/21

## 2021-04-05 NOTE — PLAN OF CARE
Problem: Ineffective Coping  Goal: Participates in unit activities  Description: Interventions:  - Provide therapeutic environment   - Provide required programming   - Redirect inappropriate behaviors   4/5/2021 0931 by Salvador Blanco  Outcome: Progressing  4/5/2021 0921 by Salvador Blanco  Outcome: Progressing

## 2021-04-05 NOTE — PROGRESS NOTES
04/05/21 0814   Team Meeting   Meeting Type Daily Rounds   Initial Conference Date 04/05/21   Team Members Present   Team Members Present Physician;Nurse;   Physician Team Member Dr Maurilio Woodson; Madhu Kirkland, [facility]   Nursing Team Member Pepper Urias, YUDY   Social Work Team Member Denver Mouse, [place]   Patient/Family Present   Patient Present No   Patient's Family Present No     [facility] referral submitted on 4/2/21, regarding which pt  is appreciative  Delusional and paranoid  Meds do not appear to help

## 2021-04-05 NOTE — PROGRESS NOTES
Ativan effective  Patient now observed resting without s/s of distress  Non-labored breathing noted  Will remain on safety precautions and continual monitoring

## 2021-04-05 NOTE — PROGRESS NOTES
PO zyprexa ineffective  Patient continues to express restlessness, anxiety, and agitation due to paranoia and hallucinations  IM 2mg  ativan given at 0256  Will continue to monitor and assess for effectiveness

## 2021-04-05 NOTE — PROGRESS NOTES
04/05/21 1330   Activity/Group Checklist   Group   (Meditation and Art)   Attendance Did not attend  (group offered and pt declined   She attended for about 3 min)

## 2021-04-05 NOTE — PROGRESS NOTES
04/05/21 0730   Activity/Group Checklist   Group   (Goal Planning and Communication )   Attendance Attended   Attendance Duration (min) 46-60   Interactions Interacted appropriately   Affect/Mood Appropriate;Bright;Calm   Goals Achieved Identified feelings; Discussed coping strategies; Able to listen to others; Able to engage in interactions

## 2021-04-05 NOTE — PROGRESS NOTES
Progress Note - Behavioral Health     Crista Garza 53 y o  female MRN: [id number]   Unit/Bed#: [facility] 249-01 Encounter: [id number]    Behavior over the last 24 hours:      Harsha Younger was seen for an inpatient, follow up psychiatric visit this date  She relates she is still feeling labile and hears voices at times  She is somewhat depressed but denies any suicidal or homicidal ideation  Her insight has improved as she agrees she is not capable of functioning in the community in her current state and needs longer term care in order to stabilize  Her appetite and sleep are good  She continues to take her medications as prescribed and denies any side effects  She is cooperative with staff and controlled on the unit  ROS: no complaints, all other systems are negative    Mental Status Evaluation:    Appearance:  casually dressed, dressed appropriately   Behavior:  cooperative   Speech:  normal rate and volume   Mood:  anxious, labile   Affect:  tearful   Thought Process:  logical, coherent   Associations: intact associations   Thought Content:  normal, no overt delusions   Perceptual Disturbances: none   Risk Potential: Suicidal ideation - None  Homicidal ideation - None  Potential for aggression - No   Sensorium:  oriented to person, place and time/date   Memory:  recent and remote memory grossly intact   Consciousness:  alert and awake   Attention: poor concentration and poor attention span   Insight:  limited   Judgment: limited   Gait/Station: normal gait/station, normal balance   Motor Activity: no abnormal movements     Vital signs in last 24 hours:    Temp:  [97 5 °F (36 4 °C)-98 4 °F (36 9 °C)] 98 3 °F (36 8 °C)  HR:  [70-95] 95  Resp:  [16-18] 16  BP: (105-146)/(65-81) 146/81    Laboratory results:  I have personally reviewed all pertinent laboratory/tests results      Progress Toward Goals: improving slowly    Assessment/Plan   Principal Problem:    Schizoaffective disorder, bipolar type (HCC)  Active Problems:    LYNN (generalized anxiety disorder)    Mild intermittent asthma without complication    Tobacco abuse    Recommended Treatment:     Continue current medications  Continue to monitor  Patrick Dose has been accepted to [facility] and D/C is planned for later this week      All current active medications have been reviewed  Encourage group therapy, milieu therapy and occupational therapy  Behavioral Health checks every 7 minutes    Current Facility-Administered Medications   Medication Dose Route Frequency Provider Last Rate    acetaminophen  650 mg Oral Q6H PRN Vidales Dais, DO      acetaminophen  650 mg Oral Q4H PRN Vidales Dais, DO      acetaminophen  975 mg Oral Q6H PRN Vidales Dais, DO      albuterol  2 puff Inhalation Q6H PRN Vidales Dais, DO      benztropine  1 mg Intramuscular Q4H PRN Max 6/day Vidales Dais, DO      benztropine  1 mg Oral Q4H PRN Max 6/day Vidales Dais, DO      benztropine  1 mg Oral HS Jorge Anaya PA-C      bismuth subsalicylate  673 mg Oral Q6H PRN [employer], DO      calcium carbonate  500 mg Oral TID PRN Selena Lorenzo PA-C      carbamide peroxide  5 drop Both Ears BID Adelita Sotelo PA-C      clonazePAM  0 5 mg Oral TID JARED Augustin      diphenhydrAMINE  25 mg Oral HS JARED Jones      escitalopram  20 mg Oral Daily Selena Lorenzo PA-C      fenofibrate  145 mg Oral Daily Maia Santacruz MD      gabapentin  600 mg Oral 4x Daily Brenda Wilson MD      haloperidol  5 mg Oral Q6H PRN Kait Cai MD      hydrOXYzine HCL  25 mg Oral Q6H PRN Max 4/day Vidales Dais, DO      hydrOXYzine HCL  50 mg Oral Q6H PRN Max 4/day Vidales Dais, DO      lidocaine  1 patch Topical Daily Maia Santacruz MD      LORazepam  2 mg Intramuscular Q8H PRN Kait Cai MD      LORazepam  1 mg Oral Q8H PRN Kait Cai MD      magnesium hydroxide  30 mL Oral Daily PRN Selena Lorenzo PA-C      nicotine  1 patch Transdermal Daily Ortiz Ridley DO      OLANZapine  10 mg Oral Q3H PRN Donato Woods MD      OLANZapine  2 5 mg Oral Q8H PRN Donato Woods MD      OLANZapine  5 mg Oral Q3H PRN Donato Woods MD      OLANZapine  10 mg Intramuscular Q3H PRN Donato Woods MD      OLANZapine  5 mg Intramuscular Q3H PRN Donato Woods MD      Pramox-PE-Glycerin-Petrolatum   Rectal 4x Daily PRN JARED Tripp      prazosin  1 mg Oral HS [place] MARTINE [place]      risperiDONE  3 mg Oral Daily [facility]      risperiDONE  4 mg Oral HS [facility] orta-LENNOX         Risks / Benefits of Treatment:    Risks, benefits, and possible side effects of medications explained to patient and patient verbalizes understanding and agreement for treatment  Counseling / Coordination of Care:      Patient's progress discussed with staff in treatment team meeting  Medications, treatment progress and treatment plan reviewed with patient

## 2021-04-05 NOTE — PROGRESS NOTES
Pt has been a little more mellow this morning  No yelling outbursts have occurred at this time  Still has an underlying irritable edge  No c/o pain  Was compliant with scheduled medications  Visible on the unit and has been attending groups  Was heard on the phone having a good conversation and laughing  Continues to have hallucinations  Denies SI/HI at this time  Cooperative with staff  Will continue to monitor and assess

## 2021-04-05 NOTE — PROGRESS NOTES
It was reported by SETH MANZO that pt needs to have a COVID test done on Wednesday in order to be D/C'd to [facility]  Provider was made aware and orders were placed

## 2021-04-05 NOTE — PROGRESS NOTES
Pt has the following in her room  1 slippers 1 pink shirt 1 gray shirt 1 orange shirt 1 pink PJS 1 white pjs 1 black pants 1 dress 1 black tank top    In Contraband  1 Pack of socks Make up bag with mis items in it 1 Phillies shirt 1 phillies socks 1 hoodie 1 white shirt 1 long sleeve shirt  1 Zebra pants 3 t shirts 1 gray leggings 1 green tank top 1 black tank top 1 orange shirt 4 shirts 5 sweat jackets 1 orange dress 1 Dove Deoderant 1 Bam lotions    2 PIECES OF MAIL BROUGHT IN GIVEN TO DAVEY HIGUERA  TO BE GONE THROUGH WITH MAVIS

## 2021-04-06 LAB
FLUAV RNA RESP QL NAA+PROBE: NEGATIVE
FLUBV RNA RESP QL NAA+PROBE: NEGATIVE
RSV RNA RESP QL NAA+PROBE: NEGATIVE
SARS-COV-2 RNA RESP QL NAA+PROBE: NEGATIVE

## 2021-04-06 PROCEDURE — 0241U HB NFCT DS VIR RESP RNA 4 TRGT: CPT | Performed by: NURSE PRACTITIONER

## 2021-04-06 PROCEDURE — 99232 SBSQ HOSP IP/OBS MODERATE 35: CPT | Performed by: PHYSICIAN ASSISTANT

## 2021-04-06 RX ADMIN — FENOFIBRATE 145 MG: 145 TABLET, FILM COATED ORAL at 08:09

## 2021-04-06 RX ADMIN — DIPHENHYDRAMINE HCL 25 MG: 25 TABLET ORAL at 21:10

## 2021-04-06 RX ADMIN — CLONAZEPAM 0.5 MG: 0.5 TABLET ORAL at 21:09

## 2021-04-06 RX ADMIN — LORAZEPAM 1 MG: 1 TABLET ORAL at 10:27

## 2021-04-06 RX ADMIN — GABAPENTIN 600 MG: 300 CAPSULE ORAL at 12:24

## 2021-04-06 RX ADMIN — CARBAMIDE PEROXIDE 6.5% 5 DROP: 6.5 LIQUID AURICULAR (OTIC) at 17:02

## 2021-04-06 RX ADMIN — GABAPENTIN 600 MG: 300 CAPSULE ORAL at 17:01

## 2021-04-06 RX ADMIN — BENZTROPINE MESYLATE 1 MG: 1 TABLET ORAL at 03:57

## 2021-04-06 RX ADMIN — CALCIUM CARBONATE (ANTACID) CHEW TAB 500 MG 500 MG: 500 CHEW TAB at 10:27

## 2021-04-06 RX ADMIN — BENZTROPINE MESYLATE 1 MG: 1 TABLET ORAL at 21:10

## 2021-04-06 RX ADMIN — ESCITALOPRAM OXALATE 20 MG: 10 TABLET ORAL at 08:10

## 2021-04-06 RX ADMIN — GABAPENTIN 600 MG: 300 CAPSULE ORAL at 08:09

## 2021-04-06 RX ADMIN — CLONAZEPAM 0.5 MG: 0.5 TABLET ORAL at 12:24

## 2021-04-06 RX ADMIN — OLANZAPINE 10 MG: 10 TABLET, ORALLY DISINTEGRATING ORAL at 00:38

## 2021-04-06 RX ADMIN — LORAZEPAM 1 MG: 1 TABLET ORAL at 01:45

## 2021-04-06 RX ADMIN — RISPERIDONE 4 MG: 2 TABLET ORAL at 21:10

## 2021-04-06 RX ADMIN — LIDOCAINE 1 PATCH: 50 PATCH CUTANEOUS at 08:09

## 2021-04-06 RX ADMIN — HYDROXYZINE HYDROCHLORIDE 50 MG: 25 TABLET, FILM COATED ORAL at 03:57

## 2021-04-06 RX ADMIN — NICOTINE 1 PATCH: 21 PATCH, EXTENDED RELEASE TRANSDERMAL at 08:11

## 2021-04-06 RX ADMIN — GABAPENTIN 600 MG: 300 CAPSULE ORAL at 21:10

## 2021-04-06 RX ADMIN — CARBAMIDE PEROXIDE 6.5% 5 DROP: 6.5 LIQUID AURICULAR (OTIC) at 08:11

## 2021-04-06 RX ADMIN — CALCIUM CARBONATE (ANTACID) CHEW TAB 500 MG 500 MG: 500 CHEW TAB at 00:52

## 2021-04-06 RX ADMIN — RISPERIDONE 3 MG: 2 TABLET ORAL at 08:10

## 2021-04-06 RX ADMIN — CLONAZEPAM 0.5 MG: 0.5 TABLET ORAL at 08:09

## 2021-04-06 NOTE — PROGRESS NOTES
Patient has been withdrawn to her room all evening  She did eat snack but has otherwise been sleeping so far  Cooperative with medications  No outbursts so far

## 2021-04-06 NOTE — PROGRESS NOTES
04/06/21 0811   Team Meeting   Meeting Type Daily Rounds   Initial Conference Date 04/07/21   Team Members Present   Team Members Present Physician;Nurse;   Physician Team Member Dr Rashawn Joyce; JARED Corrales; Nichole Schwarz PA-C   Nursing Team Member Laura Chaudhari RN   Social Work Team Member Umm Candelaria   Patient/Family Present   Patient Present No   Patient's Family Present No     DC tomorrow to [facility]  Unpredictable outbursts  Not sleeping overnight

## 2021-04-06 NOTE — DISCHARGE INSTR - OTHER ORDERS
You will discharge to [address] [facility] [address], [facility], [address] [facility]; Phone:  [phone]  Crisis Plan:    Triggers you identified during your hospital stay that led to threats against self and others included:  Noncompliance with psychiatric medication management; family conflicts; homelessness; financial stressors; and increasing anxiety and depression  Coping skills you identified during your hospital stay include:  Coloring, walks, and meditation  After discharge, if you find your coping skills are not effective and you continue to feel distressed, please talk with trusted family members and your [facility] staff  If that is not effective and you feel you are a danger to yourself or others, please contact resources that include the followin [place] [employer]:  [address]:  [phone]  Warm Line (M-F 6-10pm): [phone]  Alcohol Anonymous: [phone]    [facility]: [phone]    The Warm Line is also available M-F between the hours of 6-10pm, at [phone]  What is coronavirus disease 2019 (COVID-19)? Coronavirus disease 2019 (COVID-19) is a respiratory illness that can spread from person to person  The virus that causes COVID-19 is a novel coronavirus that was first identified during an investigation into an outbreak in [place], [place]  Can people in [place]  get COVID-19? Yes  COVID-19 is spreading from person to person in parts of the [place]  Risk of infection with COVID-19 is higher for people who are close contacts of someone known to have COVID-19, for example healthcare workers, or household members  Other people at higher risk for infection are those who live in or have recently been in an area with ongoing spread of COVID-19   Learn more about places with ongoing spread at [facility]  html#geographic  Have there been cases of COVID-19 in the [place] ?   Yes  The first case of COVID-19 in the [place] was reported on January 21, 2020  The current count of cases of COVID-19 in the [place] is available on [employer] at [facility]  How does COVID-19 spread? The virus that causes COVID-19 probably emerged from an animal source, but is now spreading from person to person  The virus is thought to spread mainly between people who are in close contact with one another (within about 6 feet) through respiratory droplets produced when an infected person coughs or sneezes  It also may be possible that a person can get COVID-19 by touching a surface or object that has the virus on it and then touching their own mouth, nose, or possibly their eyes, but this is not thought to be the main way the virus spreads  Learn what is known about the spread of newly emerged coronaviruses at [facility]  What are the symptoms of COVID-19? Patients with COVID-19 have had mild to severe respiratory illness with symptoms of   fever   cough   shortness of breath  What are severe complications from this virus? Some patients have pneumonia in both lungs, multi-organ failure and in some cases death  How can I help protect myself? People can help protect themselves from respiratory illness with everyday preventive actions  Avoid close contact with people who are sick  Avoid touching your eyes, nose, and mouth withunwashed hands  Wash your hands often with soap and water for at least 20 seconds  Use an alcohol-based hand  that contains at least 60% alcohol if soap and water are not available  If you are sick, to keep from spreading respiratory illness to others, you should   Stay home when you are sick      Cover your cough or sneeze with a tissue, then throw the tissue in the trash  Clean and disinfect frequently touched objectsand surfaces  What should I do if I recently traveled from an area with ongoing spread of COVID-19? If you have traveled from an affected area, there may be restrictions on your movements for up to 2 weeks  If you develop symptoms during that period (fever, cough, trouble breathing), seek medical advice  Call the office of your health care provider before you go, and tell them about your travel and your symptoms  They will give you instructions on how to get care without exposing other people to your illness  While sick, avoid contact with people, don't go out and delay any travel to reduce the possibility of spreading illness to others  Is there a treatment? There is no specific antiviral treatment for COVID-19  People with COVID-19 can seek medical care to helprelieve symptoms  For more information: www cdc gov/HZPLM06XI 846853-O 03/03/2020     What to do if you are sick withcoronavirus disease 2019 (COVID-19)     If you are sick with COVID-19 or suspect you are infected with the virus that causes COVID-19, follow the steps below to help prevent the disease from spreading to people in your home and community  Stay home except to get medical care   You should restrict activities outside your home, except for getting medical care  Do not go to work, school, or public areas  Avoid using public transportation, ride-sharing, or taxis  Separate yourself from other people and animals inyour home  People: As much as possible, you should stay in a specific room and away from other people in your home  Also, you should use a separate bathroom, if available  Animals: Do not handle pets or other animals while sick  See COVID-19 and Animals for more information    Call ahead before visiting your doctor   If you have a medical appointment, call the healthcare provider and tell them that you have or may have COVID-19  This will help the healthcare provider's office take steps to keep other people from getting infected or exposed  Wear a facemask  You should wear a facemask when you are around other people (e g , sharing a room or vehicle) or pets and before you enter a healthcare provider's office  If you are not able to wear a facemask (for example, because it causes trouble breathing), then people who live with you should not stay in the same room with you, or they should wear a facemask if they enteryour room  Cover your coughs and sneezes   Cover your mouth and nose with a tissue when you cough or sneeze  Throw used tissues in a lined trash can; immediately wash your hands with soap and water for at least 20 seconds or clean your hands with an alcohol-based hand  that contains at least 60 to 95% alcohol, covering all surfaces of your hands and rubbing them together until they feel dry  Soap and water should be used preferentially if hands are visibly dirty  Avoid sharing personal household items   You should not share dishes, drinking glasses, cups, eating utensils, towels, or bedding with other people or pets in your home  After using these items, they should be washed thoroughly with soap and water  Clean your hands often  Wash your hands often with soap and water for at least 20 seconds  If soap and water are not available, clean your hands with an alcohol-based hand  that contains at least 60% alcohol, covering all surfaces of your hands and rubbing them together until they feel dry  Soap and water should be used preferentially if hands are visibly dirty  Avoid touching your eyes, nose, and mouth with unwashed hands  Clean all "high-touch" surfaces every day  High touch surfaces include counters, tabletops, doorknobs, bathroom fixtures, toilets, phones, keyboards, tablets, and bedside tables  Also, clean any surfaces that may have blood, stool, or body fluids on them   Use a household cleaning spray or wipe, according to the label instructions  Labels contain instructions for safe and effective use of the cleaning product including precautions you should take when applying the product, such as wearing gloves and making sure you have good ventilation during use of the product  Monitor your symptoms  Seek prompt medical attention if your illness is worsening (e g , difficulty breathing)  Before seeking care, call your healthcare provider and tell them that you have, or are being evaluated for, COVID-19  Put on a facemask before you enter the facility  These steps will help the healthcare provider's office to keep other people in the office or waiting room from getting infectedor exposed  Ask your healthcare provider to call the local or state health department  Persons who are placed under active monitoring or facilitated self-monitoring should follow instructions provided by their local health department or occupational health professionals, as appropriate  If you have a medical emergency and need to call 911, notify the dispatch personnel that you have, or are being evaluated for COVID-19  If possible, put on a facemask before emergency medical services arrive  Discontinuing home isolation  Patients with confirmed COVID-19 should remain under home isolation precautions until the risk of secondary transmission to others is thought to be low  The decision to discontinue home isolation precautions should be made on a case-by-case basis, in consultation with healthcare providers and state and local health departments  For more information: www cdc gov/QSTVI83BD 248050-T 02/24/2020     Stay home when you are sick,except to get medical care  Wash your hands often with soap and water for at least 20 seconds  Cover your cough or sneeze with a tissue, then throw the tissue in the trash  Clean and disinfect frequently touched objects and surfaces  Avoid touching your eyes, nose, and mouth  STOP THE SPREAD OF GERMS  For more information: www cdc gov/COVID19 Avoid close contact with people who are sick  Help prevent the spread of respiratory diseases like COVID-19  [address]   Do you feel     Stressed?  Overwhelmed?  Alone?  Afraid?  Anxious? Get connected with a FREE crisis counselor  During these uncertain times, you are not alone  We are here to listen  Please call our [facility] and Referral Line: [phone] TTY: [phone]     There are trained professionals available 24/7 ready to help you navigate these unprecedented challenges  These services are FREE & CONFIDENTIAL  This publication was made possible by [employer] Number [id number], in collaboration with the [address] [place] [address]  A Peer Hotline is also available M-F between the hours of 6-10pm, at [phone]

## 2021-04-06 NOTE — PLAN OF CARE
Problem: SUBSTANCE USE/ABUSE  Goal: Will have no detox symptoms and will verbalize plan for changing substance-related behavior  Description: INTERVENTIONS:  - Monitor physical status and assess for symptoms of withdrawal  - Administer medication as ordered  - Provide emotional support with 1 on 1 interaction with staff  - Encourage recovery focused program/ addiction education  - Assess for verbalization of changing behaviors related to substance abuse  - Initiate consults and referrals as appropriate (Case Management, Spiritual Care, etc )  Outcome: Progressing  Goal: By discharge, will develop insight into their chemical dependency and sustain motivation to continue in recovery  Description: INTERVENTIONS:  - Attends all daily group sessions and scheduled AA groups  - Actively practices coping skills through participation in the therapeutic community and adherence to program rules  - Reviews and completes assignments from individual treatment plan  - Assist patient development of understanding of their personal cycle of addiction and relapse triggers  Outcome: Progressing  Goal: By discharge, patient will have ongoing treatment plan addressing chemical dependency  Description: INTERVENTIONS:  - Assist patient with resources and/or appointments for ongoing recovery based living  Outcome: Progressing     Problem: SLEEP DISTURBANCE  Goal: Will exhibit normal sleeping pattern  Description: Interventions:  -  Assess the patients sleep pattern, noting recent changes  - Administer medication as ordered  - Decrease environmental stimuli, including noise, as appropriate during the night  - Encourage the patient to actively participate in unit groups and or exercise during the day to enhance ability to achieve adequate sleep at night  - Assess the patient, in the morning, encouraging a description of sleep experience  Outcome: Progressing     Problem: SELF CARE DEFICIT  Goal: Return ADL status to a safe level of function  Description: INTERVENTIONS:  - Administer medication as ordered  - Assess ADL deficits and provide assistive devices as needed  - Obtain PT/OT consults as needed  - Assist and instruct patient to increase activity and self care as tolerated  Outcome: Progressing

## 2021-04-06 NOTE — PROGRESS NOTES
Patient has had a difficult night  Woke around 0030 very agitated and hearing voices  Requested and received prn zyprexa  Medication ineffective  Patient restless and trying to go back to sleep but unable to  Showered in an attempt to calm down  Requested and received prn ativan around 0130  Medication ineffective  Patient began screaming very loudly in her room at no one   "I hate you! Fucking die! Get out of my head!"  Slamming things in her room  Patient was asked multiple times to keep her voice down but she just screamed at staff  Patient eventually agreed to go in room 243 to try to calm down and promote quiet on the unit  Continued to scream in room 243  She received prn atarax 50 and cogentin 1mg at 0400  She started to quiet down and apologized to this writer    She fell asleep aroun 0430

## 2021-04-06 NOTE — PROGRESS NOTES
Progress Note - [address] 53 y o  female MRN: [id number]   Unit/Bed#: U 249-01 Encounter: [id number]    Behavior over the last 24 hours: karime Ceron is a 56-year-old female with a history of schizoaffective disorder bipolar type who presents for psychiatric follow-up  Patient is calm and cooperative upon approach today, no longer tearful or actively psychotic, though staff reports she continues to experience intermittent psychotic outbursts with yelling and screaming in her room throughout the day  In addition to appearing more calm, she states her auditory hallucinations of disturbing thoughts are still present but not as frequent  She endorses good energy and appetite but states she is having difficulty concentrating and sleeping through the night  She has been accepted by [facility] and is scheduled to transport to that unit tomorrow  Sleep: early awakening  Appetite: normal  Medication side effects: No   ROS: reports difficulty concentrating, all other systems are negative    Mental Status Evaluation:    Appearance:  age appropriate, casually dressed, adequate grooming   Behavior:  cooperative, calm   Speech:  normal rate and volume, slightly less disorganized    Mood:  euthymic   Affect:  reactive, slightly brighter   Thought Process:  circumstantial, still at times disorganized   Associations: circumstantial associations   Thought Content:  some paranoia, preoccupied with EAC discharge   Perceptual Disturbances: auditory hallucinations still present, but less frequent, chronic, no commands, no visual hallucinations, still with intermittent psychotic outbursts throughout the day   Risk Potential: Suicidal ideation - None at present, but had suicidal ideation prior to admission    Homicidal ideation - None at present  Potential for aggression - No   Sensorium:  oriented to person, place and time/date   Memory:  recent and remote memory grossly intact   Consciousness:  alert and awake   Attention/Concentration: attention span and concentration are age appropriate   Insight:  limited   Judgment: limited   Gait/Station: normal gait/station, normal balance   Motor Activity: no abnormal movements     Vital signs in last 24 hours:    Temp:  [98 1 °F (36 7 °C)-98 3 °F (36 8 °C)] 98 1 °F (36 7 °C)  HR:  [] 105  Resp:  [16-18] 18  BP: (111-146)/(74-81) 111/74    Laboratory results: I have personally reviewed all pertinent laboratory/tests results    Most Recent Labs:   Lab Results   Component Value Date    WBC 9 70 03/16/2021    RBC 4 91 03/16/2021    HGB 14 9 03/16/2021    HCT 45 1 03/16/2021     03/16/2021    RDW 14 1 03/16/2021    NEUTROABS 7 10 (H) 03/16/2021    SODIUM 134 03/17/2021    K 4 0 03/17/2021    CL 99 03/17/2021    CO2 29 03/17/2021    BUN 14 03/17/2021    CREATININE 0 56 (L) 03/17/2021    GLUC 112 (H) 03/17/2021    CALCIUM 9 4 03/17/2021    AST 27 03/17/2021    ALT 18 03/17/2021    ALKPHOS 67 03/17/2021    TP 7 0 03/17/2021    ALB 4 2 03/17/2021    TBILI 0 40 03/17/2021    CHOLESTEROL 300 (H) 03/31/2021    HDL 57 03/31/2021    TRIG 658 (H) 03/31/2021    [address]  03/31/2021      Comment:      Calculated LDL invalid, triglycerides >400 mg/dl    NONHDLC 147 08/23/2020    AMMONIA 28 10/27/2017    MMF1EHNNWVKQ 2 190 10/24/2019    FREET4 0 89 03/24/2016    PREGUR negative 03/16/2021    PREGSERUM Negative 10/21/2019    HCG <2 00 04/10/2014    RPR Non-Reactive 03/31/2021    HGBA1C 5 0 08/06/2019    EAG 97 08/06/2019       Progress Toward Goals: somewhat more calm today, slightly improved AH but still present  still with intermittent psychotic outbursts with screaming, somewhat redirectable    Assessment/Plan   Principal Problem:    Schizoaffective disorder, bipolar type (HCC)  Active Problems:    LYNN (generalized anxiety disorder)    Mild intermittent asthma without complication    Tobacco abuse      Recommended Treatment:     Planned medication and treatment changes:     All current active medications have been reviewed  Encourage group therapy, milieu therapy and occupational therapy  Behavioral Health checks every 7 minutes    Continue current medications  Discharge planning to [facility] tomorrow      Current Facility-Administered Medications   Medication Dose Route Frequency Provider Last Rate    acetaminophen  650 mg Oral Q6H PRN Almer Abbot, DO      acetaminophen  650 mg Oral Q4H PRN Almer Abbot, DO      acetaminophen  975 mg Oral Q6H PRN Almer Abbot, DO      albuterol  2 puff Inhalation Q6H PRN Almer Abbot, DO      benztropine  1 mg Intramuscular Q4H PRN Max 6/day Almer Abbot, DO      benztropine  1 mg Oral Q4H PRN Max 6/day Almer Abbot, DO      benztropine  1 mg Oral HS FAISAL Menendez-C      bismuth subsalicylate  745 mg Oral Q6H PRN Terry Beltrán, DO      calcium carbonate  500 mg Oral TID PRN Mcfarlane Mix, PA-C      carbamide peroxide  5 drop Both Ears BID Pascual Munoz PA-C      clonazePAM  0 5 mg Oral TID JARED Augustin      diphenhydrAMINE  25 mg Oral HS JARED Jones      escitalopram  20 mg Oral Daily Mcfarlane Mix, PA-C      fenofibrate  145 mg Oral Daily Yulia Thorpe MD      gabapentin  600 mg Oral 4x Daily Nicki Schwarz MD      haloperidol  5 mg Oral Q6H PRN Rajan Bello MD      hydrOXYzine HCL  25 mg Oral Q6H PRN Max 4/day Almer Abbot, DO      hydrOXYzine HCL  50 mg Oral Q6H PRN Max 4/day Almer Abbot, DO      lidocaine  1 patch Topical Daily Yulia Thorpe MD      LORazepam  2 mg Intramuscular Q8H PRN Rajan Bello MD      LORazepam  1 mg Oral Q8H PRN Rajan Bello MD      magnesium hydroxide  30 mL Oral Daily PRN Mcfarlane Mix, PA-C      nicotine  1 patch Transdermal Daily Almer Abbot, DO      OLANZapine  10 mg Oral Q3H PRN Rajan Bello MD      OLANZapine  2 5 mg Oral Q8H PRN Rajan Bello MD      OLANZapine  5 mg Oral Q3H PRN Ulysses Shaggy MD Florencio      OLANZapine  10 mg Intramuscular Q3H PRN Rajan Bello MD      OLANZapine  5 mg Intramuscular Q3H PRN Rajan Bello MD      Pramox-PE-Glycerin-Petrolatum   Rectal 4x Daily PRN NydiaJARED Acharya      prazosin  1 mg Oral HS Mcfarlane Mix, PA-C      risperiDONE  3 mg Oral Daily Mcfarlane Mix, PA-C      risperiDONE  4 mg Oral HS Mcfarlane Mix, PA-LENNOX       Risks / Benefits of Treatment:    Risks, benefits, and possible side effects of medications explained to patient and patient verbalizes understanding and agreement for treatment  Counseling / Coordination of Care:    Patient's progress discussed with staff in treatment team meeting  Medications, treatment progress and treatment plan reviewed with patient      Denys Dewitt PA-C 04/06/21

## 2021-04-06 NOTE — PROGRESS NOTES
04/06/21 0730   Activity/Group Checklist   Group   (Goal Planning and Communication )   Attendance Attended   Attendance Duration (min) 46-60   Interactions Interacted appropriately   Affect/Mood Appropriate;Bright;Calm   Goals Achieved Identified feelings; Discussed coping strategies; Able to listen to others; Able to engage in interactions

## 2021-04-07 ENCOUNTER — HOSPITAL ENCOUNTER (INPATIENT)
Facility: HOSPITAL | Age: 51
LOS: 113 days | Discharge: HOME | DRG: 750 | End: 2021-07-29
Attending: PSYCHIATRY & NEUROLOGY | Admitting: PSYCHIATRY & NEUROLOGY
Payer: COMMERCIAL

## 2021-04-07 VITALS
BODY MASS INDEX: 22.97 KG/M2 | DIASTOLIC BLOOD PRESSURE: 79 MMHG | HEIGHT: 60 IN | HEART RATE: 77 BPM | RESPIRATION RATE: 18 BRPM | SYSTOLIC BLOOD PRESSURE: 121 MMHG | TEMPERATURE: 97.3 F | OXYGEN SATURATION: 95 % | WEIGHT: 117 LBS

## 2021-04-07 DIAGNOSIS — E78.5 HYPERLIPIDEMIA: ICD-10-CM

## 2021-04-07 DIAGNOSIS — R52 BODY ACHES: ICD-10-CM

## 2021-04-07 DIAGNOSIS — M79.672 LEFT FOOT PAIN: ICD-10-CM

## 2021-04-07 DIAGNOSIS — K21.9 GASTROESOPHAGEAL REFLUX DISEASE, UNSPECIFIED WHETHER ESOPHAGITIS PRESENT: ICD-10-CM

## 2021-04-07 DIAGNOSIS — K08.89 TOOTH PAIN: ICD-10-CM

## 2021-04-07 DIAGNOSIS — F25.0 SCHIZOAFFECTIVE DISORDER, BIPOLAR TYPE (HCC): Primary | Chronic | ICD-10-CM

## 2021-04-07 DIAGNOSIS — M54.9 BACKACHE: ICD-10-CM

## 2021-04-07 DIAGNOSIS — G89.29 CHRONIC MIDLINE LOW BACK PAIN WITHOUT SCIATICA: ICD-10-CM

## 2021-04-07 DIAGNOSIS — J30.0 VASOMOTOR RHINITIS: ICD-10-CM

## 2021-04-07 DIAGNOSIS — J45.21 MILD INTERMITTENT ASTHMATIC BRONCHITIS WITH ACUTE EXACERBATION: ICD-10-CM

## 2021-04-07 DIAGNOSIS — F43.12 POST-TRAUMATIC STRESS DISORDER, CHRONIC: ICD-10-CM

## 2021-04-07 DIAGNOSIS — K64.9 HEMORRHOIDS: ICD-10-CM

## 2021-04-07 DIAGNOSIS — M79.671 RIGHT FOOT PAIN: ICD-10-CM

## 2021-04-07 DIAGNOSIS — M54.50 CHRONIC MIDLINE LOW BACK PAIN WITHOUT SCIATICA: ICD-10-CM

## 2021-04-07 PROCEDURE — 99238 HOSP IP/OBS DSCHRG MGMT 30/<: CPT | Performed by: NURSE PRACTITIONER

## 2021-04-07 PROCEDURE — 99238 HOSP IP/OBS DSCHRG MGMT 30/<: CPT | Performed by: HOSPITALIST

## 2021-04-07 RX ORDER — ALBUTEROL SULFATE 90 UG/1
2 AEROSOL, METERED RESPIRATORY (INHALATION) EVERY 6 HOURS PRN
Status: DISCONTINUED | OUTPATIENT
Start: 2021-04-07 | End: 2021-07-29 | Stop reason: HOSPADM

## 2021-04-07 RX ORDER — ALBUTEROL SULFATE 90 UG/1
2 AEROSOL, METERED RESPIRATORY (INHALATION) EVERY 6 HOURS PRN
Status: CANCELLED | OUTPATIENT
Start: 2021-04-07

## 2021-04-07 RX ORDER — HYDROXYZINE 50 MG/1
50 TABLET, FILM COATED ORAL
Status: DISCONTINUED | OUTPATIENT
Start: 2021-04-07 | End: 2021-07-29 | Stop reason: HOSPADM

## 2021-04-07 RX ORDER — HYDROXYZINE HYDROCHLORIDE 25 MG/1
25 TABLET, FILM COATED ORAL
Status: CANCELLED | OUTPATIENT
Start: 2021-04-07

## 2021-04-07 RX ORDER — ACETAMINOPHEN 325 MG/1
975 TABLET ORAL EVERY 6 HOURS PRN
Status: CANCELLED | OUTPATIENT
Start: 2021-04-07

## 2021-04-07 RX ORDER — CALCIUM CARBONATE 200(500)MG
500 TABLET,CHEWABLE ORAL 3 TIMES DAILY PRN
Status: DISCONTINUED | OUTPATIENT
Start: 2021-04-07 | End: 2021-07-29 | Stop reason: HOSPADM

## 2021-04-07 RX ORDER — OLANZAPINE 10 MG/1
10 TABLET, ORALLY DISINTEGRATING ORAL
Status: CANCELLED | OUTPATIENT
Start: 2021-04-07

## 2021-04-07 RX ORDER — LORAZEPAM 2 MG/ML
2 INJECTION INTRAMUSCULAR EVERY 8 HOURS PRN
Status: CANCELLED | OUTPATIENT
Start: 2021-04-07

## 2021-04-07 RX ORDER — OLANZAPINE 5 MG/1
2.5 TABLET, ORALLY DISINTEGRATING ORAL EVERY 8 HOURS PRN
Status: DISCONTINUED | OUTPATIENT
Start: 2021-04-07 | End: 2021-07-29 | Stop reason: HOSPADM

## 2021-04-07 RX ORDER — OLANZAPINE 5 MG/1
5 TABLET, ORALLY DISINTEGRATING ORAL
Status: CANCELLED | OUTPATIENT
Start: 2021-04-07

## 2021-04-07 RX ORDER — HALOPERIDOL 5 MG
5 TABLET ORAL EVERY 6 HOURS PRN
Status: DISCONTINUED | OUTPATIENT
Start: 2021-04-07 | End: 2021-07-29 | Stop reason: HOSPADM

## 2021-04-07 RX ORDER — CALCIUM CARBONATE 200(500)MG
500 TABLET,CHEWABLE ORAL 3 TIMES DAILY PRN
Status: CANCELLED | OUTPATIENT
Start: 2021-04-07

## 2021-04-07 RX ORDER — GABAPENTIN 300 MG/1
600 CAPSULE ORAL 4 TIMES DAILY
Status: DISCONTINUED | OUTPATIENT
Start: 2021-04-07 | End: 2021-07-29 | Stop reason: HOSPADM

## 2021-04-07 RX ORDER — LIDOCAINE 50 MG/G
1 PATCH TOPICAL DAILY
Status: CANCELLED | OUTPATIENT
Start: 2021-04-08

## 2021-04-07 RX ORDER — HALOPERIDOL 5 MG
5 TABLET ORAL EVERY 6 HOURS PRN
Status: CANCELLED | OUTPATIENT
Start: 2021-04-07

## 2021-04-07 RX ORDER — BENZTROPINE MESYLATE 1 MG/1
1 TABLET ORAL
Status: CANCELLED | OUTPATIENT
Start: 2021-04-07

## 2021-04-07 RX ORDER — OLANZAPINE 10 MG/1
5 INJECTION, POWDER, LYOPHILIZED, FOR SOLUTION INTRAMUSCULAR
Status: DISCONTINUED | OUTPATIENT
Start: 2021-04-07 | End: 2021-07-29 | Stop reason: HOSPADM

## 2021-04-07 RX ORDER — OLANZAPINE 10 MG/1
10 INJECTION, POWDER, LYOPHILIZED, FOR SOLUTION INTRAMUSCULAR
Status: CANCELLED | OUTPATIENT
Start: 2021-04-07

## 2021-04-07 RX ORDER — DIPHENHYDRAMINE HCL 25 MG
25 TABLET ORAL
Status: DISCONTINUED | OUTPATIENT
Start: 2021-04-07 | End: 2021-07-29 | Stop reason: HOSPADM

## 2021-04-07 RX ORDER — OLANZAPINE 10 MG/1
5 INJECTION, POWDER, LYOPHILIZED, FOR SOLUTION INTRAMUSCULAR
Status: CANCELLED | OUTPATIENT
Start: 2021-04-07

## 2021-04-07 RX ORDER — PRAZOSIN HYDROCHLORIDE 1 MG/1
1 CAPSULE ORAL
Status: CANCELLED | OUTPATIENT
Start: 2021-04-07

## 2021-04-07 RX ORDER — RISPERIDONE 2 MG/1
4 TABLET, FILM COATED ORAL
Status: CANCELLED | OUTPATIENT
Start: 2021-04-07

## 2021-04-07 RX ORDER — OLANZAPINE 10 MG/1
10 INJECTION, POWDER, LYOPHILIZED, FOR SOLUTION INTRAMUSCULAR
Status: DISCONTINUED | OUTPATIENT
Start: 2021-04-07 | End: 2021-07-29 | Stop reason: HOSPADM

## 2021-04-07 RX ORDER — LORAZEPAM 1 MG/1
1 TABLET ORAL EVERY 8 HOURS PRN
Status: CANCELLED | OUTPATIENT
Start: 2021-04-07

## 2021-04-07 RX ORDER — BENZTROPINE MESYLATE 1 MG/1
1 TABLET ORAL
Status: DISCONTINUED | OUTPATIENT
Start: 2021-04-07 | End: 2021-04-23

## 2021-04-07 RX ORDER — RISPERIDONE 2 MG/1
4 TABLET, FILM COATED ORAL
Status: DISCONTINUED | OUTPATIENT
Start: 2021-04-07 | End: 2021-04-09

## 2021-04-07 RX ORDER — FENOFIBRATE 145 MG/1
145 TABLET, COATED ORAL DAILY
Status: DISCONTINUED | OUTPATIENT
Start: 2021-04-08 | End: 2021-07-29 | Stop reason: HOSPADM

## 2021-04-07 RX ORDER — OLANZAPINE 5 MG/1
2.5 TABLET, ORALLY DISINTEGRATING ORAL EVERY 8 HOURS PRN
Status: CANCELLED | OUTPATIENT
Start: 2021-04-07

## 2021-04-07 RX ORDER — LORAZEPAM 1 MG/1
1 TABLET ORAL EVERY 8 HOURS PRN
Status: DISCONTINUED | OUTPATIENT
Start: 2021-04-07 | End: 2021-04-13

## 2021-04-07 RX ORDER — ACETAMINOPHEN 325 MG/1
650 TABLET ORAL EVERY 4 HOURS PRN
Status: CANCELLED | OUTPATIENT
Start: 2021-04-07

## 2021-04-07 RX ORDER — FENOFIBRATE 145 MG/1
145 TABLET, COATED ORAL DAILY
Status: CANCELLED | OUTPATIENT
Start: 2021-04-08

## 2021-04-07 RX ORDER — CLONAZEPAM 0.5 MG/1
0.5 TABLET ORAL 3 TIMES DAILY
Status: DISCONTINUED | OUTPATIENT
Start: 2021-04-07 | End: 2021-04-09

## 2021-04-07 RX ORDER — OLANZAPINE 5 MG/1
5 TABLET, ORALLY DISINTEGRATING ORAL
Status: DISCONTINUED | OUTPATIENT
Start: 2021-04-07 | End: 2021-07-29 | Stop reason: HOSPADM

## 2021-04-07 RX ORDER — PRAZOSIN HYDROCHLORIDE 1 MG/1
1 CAPSULE ORAL
Status: DISCONTINUED | OUTPATIENT
Start: 2021-04-07 | End: 2021-04-17

## 2021-04-07 RX ORDER — ESCITALOPRAM OXALATE 10 MG/1
20 TABLET ORAL DAILY
Status: CANCELLED | OUTPATIENT
Start: 2021-04-08

## 2021-04-07 RX ORDER — BENZTROPINE MESYLATE 1 MG/ML
1 INJECTION INTRAMUSCULAR; INTRAVENOUS
Status: DISCONTINUED | OUTPATIENT
Start: 2021-04-07 | End: 2021-07-29 | Stop reason: HOSPADM

## 2021-04-07 RX ORDER — DIPHENHYDRAMINE HCL 25 MG
25 TABLET ORAL
Status: CANCELLED | OUTPATIENT
Start: 2021-04-07

## 2021-04-07 RX ORDER — HYDROXYZINE HYDROCHLORIDE 25 MG/1
50 TABLET, FILM COATED ORAL
Status: CANCELLED | OUTPATIENT
Start: 2021-04-07

## 2021-04-07 RX ORDER — ACETAMINOPHEN 325 MG/1
650 TABLET ORAL EVERY 6 HOURS PRN
Status: DISCONTINUED | OUTPATIENT
Start: 2021-04-07 | End: 2021-07-29 | Stop reason: HOSPADM

## 2021-04-07 RX ORDER — ACETAMINOPHEN 325 MG/1
650 TABLET ORAL EVERY 6 HOURS PRN
Status: CANCELLED | OUTPATIENT
Start: 2021-04-07

## 2021-04-07 RX ORDER — BENZTROPINE MESYLATE 1 MG/1
1 TABLET ORAL
Status: DISCONTINUED | OUTPATIENT
Start: 2021-04-07 | End: 2021-07-29 | Stop reason: HOSPADM

## 2021-04-07 RX ORDER — ACETAMINOPHEN 325 MG/1
650 TABLET ORAL EVERY 4 HOURS PRN
Status: DISCONTINUED | OUTPATIENT
Start: 2021-04-07 | End: 2021-07-29 | Stop reason: HOSPADM

## 2021-04-07 RX ORDER — BENZTROPINE MESYLATE 1 MG/ML
1 INJECTION INTRAMUSCULAR; INTRAVENOUS
Status: CANCELLED | OUTPATIENT
Start: 2021-04-07

## 2021-04-07 RX ORDER — OLANZAPINE 10 MG/1
10 TABLET, ORALLY DISINTEGRATING ORAL
Status: DISCONTINUED | OUTPATIENT
Start: 2021-04-07 | End: 2021-04-15

## 2021-04-07 RX ORDER — ESCITALOPRAM OXALATE 10 MG/1
20 TABLET ORAL DAILY
Status: DISCONTINUED | OUTPATIENT
Start: 2021-04-08 | End: 2021-05-26

## 2021-04-07 RX ORDER — NICOTINE 21 MG/24HR
1 PATCH, TRANSDERMAL 24 HOURS TRANSDERMAL DAILY
Status: CANCELLED | OUTPATIENT
Start: 2021-04-08

## 2021-04-07 RX ORDER — CLONAZEPAM 0.5 MG/1
0.5 TABLET ORAL 3 TIMES DAILY
Status: CANCELLED | OUTPATIENT
Start: 2021-04-07

## 2021-04-07 RX ORDER — HYDROXYZINE HYDROCHLORIDE 25 MG/1
25 TABLET, FILM COATED ORAL
Status: DISCONTINUED | OUTPATIENT
Start: 2021-04-07 | End: 2021-07-29 | Stop reason: HOSPADM

## 2021-04-07 RX ORDER — LORAZEPAM 2 MG/ML
2 INJECTION INTRAMUSCULAR EVERY 8 HOURS PRN
Status: DISCONTINUED | OUTPATIENT
Start: 2021-04-07 | End: 2021-04-09

## 2021-04-07 RX ORDER — LIDOCAINE 50 MG/G
1 PATCH TOPICAL DAILY
Status: DISCONTINUED | OUTPATIENT
Start: 2021-04-08 | End: 2021-07-29 | Stop reason: HOSPADM

## 2021-04-07 RX ORDER — ACETAMINOPHEN 325 MG/1
975 TABLET ORAL EVERY 6 HOURS PRN
Status: DISCONTINUED | OUTPATIENT
Start: 2021-04-07 | End: 2021-07-29 | Stop reason: HOSPADM

## 2021-04-07 RX ORDER — NICOTINE 21 MG/24HR
1 PATCH, TRANSDERMAL 24 HOURS TRANSDERMAL DAILY
Status: DISCONTINUED | OUTPATIENT
Start: 2021-04-08 | End: 2021-07-29 | Stop reason: HOSPADM

## 2021-04-07 RX ORDER — GABAPENTIN 300 MG/1
600 CAPSULE ORAL 4 TIMES DAILY
Status: CANCELLED | OUTPATIENT
Start: 2021-04-07

## 2021-04-07 RX ADMIN — CLONAZEPAM 0.5 MG: 0.5 TABLET ORAL at 22:22

## 2021-04-07 RX ADMIN — RISPERIDONE 3 MG: 2 TABLET ORAL at 08:08

## 2021-04-07 RX ADMIN — ALBUTEROL SULFATE 2 PUFF: 90 AEROSOL, METERED RESPIRATORY (INHALATION) at 09:43

## 2021-04-07 RX ADMIN — CLONAZEPAM 0.5 MG: 0.5 TABLET ORAL at 11:29

## 2021-04-07 RX ADMIN — LIDOCAINE 1 PATCH: 50 PATCH CUTANEOUS at 08:10

## 2021-04-07 RX ADMIN — OLANZAPINE 10 MG: 10 TABLET, ORALLY DISINTEGRATING ORAL at 09:23

## 2021-04-07 RX ADMIN — CARBAMIDE PEROXIDE 6.5% 5 DROP: 6.5 LIQUID AURICULAR (OTIC) at 18:32

## 2021-04-07 RX ADMIN — GABAPENTIN 600 MG: 300 CAPSULE ORAL at 22:23

## 2021-04-07 RX ADMIN — CLONAZEPAM 0.5 MG: 0.5 TABLET ORAL at 08:08

## 2021-04-07 RX ADMIN — GABAPENTIN 600 MG: 300 CAPSULE ORAL at 11:29

## 2021-04-07 RX ADMIN — BENZTROPINE MESYLATE 1 MG: 1 TABLET ORAL at 22:23

## 2021-04-07 RX ADMIN — LORAZEPAM 1 MG: 1 TABLET ORAL at 06:20

## 2021-04-07 RX ADMIN — GABAPENTIN 600 MG: 300 CAPSULE ORAL at 08:08

## 2021-04-07 RX ADMIN — GABAPENTIN 600 MG: 300 CAPSULE ORAL at 17:48

## 2021-04-07 RX ADMIN — DIPHENHYDRAMINE HCL 25 MG: 25 TABLET ORAL at 22:23

## 2021-04-07 RX ADMIN — RISPERIDONE 4 MG: 2 TABLET ORAL at 22:23

## 2021-04-07 RX ADMIN — FENOFIBRATE 145 MG: 145 TABLET, FILM COATED ORAL at 08:09

## 2021-04-07 RX ADMIN — ESCITALOPRAM OXALATE 20 MG: 10 TABLET ORAL at 08:08

## 2021-04-07 RX ADMIN — GLYCERIN, PETROLATUM, PHENYLEPHRINE HCL, PRAMOXINE HCL: 144; 2.5; 10; 15 CREAM TOPICAL at 09:44

## 2021-04-07 RX ADMIN — NICOTINE 1 PATCH: 21 PATCH, EXTENDED RELEASE TRANSDERMAL at 08:17

## 2021-04-07 NOTE — PROGRESS NOTES
04/07/21 0730   Activity/Group Checklist   Group   (Goal Planning and Communication )   Attendance Attended   Attendance Duration (min) 46-60   Interactions Interacted appropriately   Affect/Mood Appropriate;Bright   Goals Achieved Identified feelings; Discussed coping strategies; Able to listen to others; Able to engage in interactions

## 2021-04-07 NOTE — PROGRESS NOTES
Patient slept throughout the night until approximately 4:30 am  At first patient  was calm and pleasant  Patient became enraged after 6 am  Patient was pacing the halls screaming  Unable to redirect  Will continue to monitor through 7 minute checks

## 2021-04-07 NOTE — DISCHARGE INSTR - APPOINTMENTS
The treatment recommends that you participate in [facility] ([facility]) which services will assist you to meet your overall needs  On Wednesday, March 7, 2021 at 1:00 pm, via [employer], you will be transported to [facility] ([facility]), [address] [place] [address]; Phone:  [phone]  Please reach out to your [facility]  / therapist to request information, as needed  Your summary of care will be provided to this provider for continuity of care  Dr Sampson Presume will provide you with psychiatric medication management during your [facility] stay  Your medical needs will be addressed by [facility]

## 2021-04-07 NOTE — PROGRESS NOTES
Patient visible in milieu, pleasant and cooperative in interaction  Social with staff and selectively social with peers  Periods of yelling out/paranoia, PRN Zyprexa with positive effect  Patient currently calm without further outbursts  Patient endorses anxiety, denies depression, SI/HI, hallucinations  Remains medication compliant and on 7" checks for safety and behaviors

## 2021-04-07 NOTE — PROGRESS NOTES
Patient compliant with medications and meals  Patient denies  SI/HI  Pleasant and cooperative  Visible on unit  Q 7 min checks continue  Offers no complaints at this time

## 2021-04-07 NOTE — PROGRESS NOTES
04/07/21 1000   Activity/Group Checklist   Group   (Group Therapy)   Attendance Attended   Attendance Duration (min) 0-15   Interactions Interacted appropriately   Affect/Mood Appropriate;Calm   Goals Achieved Able to listen to others; Able to engage in interactions

## 2021-04-07 NOTE — PLAN OF CARE
Problem: Nutrition/Hydration-ADULT  Goal: Nutrient/Hydration intake appropriate for improving, restoring or maintaining nutritional needs  Description: Monitor and assess patient's nutrition/hydration status for malnutrition  Collaborate with interdisciplinary team and initiate plan and interventions as ordered  Monitor patient's weight and dietary intake as ordered or per policy  Utilize nutrition screening tool and intervene as necessary  Determine patient's food preferences and provide high-protein, high-caloric foods as appropriate       INTERVENTIONS:  - Monitor oral intake, urinary output, labs, and treatment plans  - Assess nutrition and hydration status and recommend course of action  - Evaluate amount of meals eaten  - Assist patient with eating if necessary   - Allow adequate time for meals  - Recommend/ encourage appropriate diets, oral nutritional supplements, and vitamin/mineral supplements  - Order, calculate, and assess calorie counts as needed  - Recommend, monitor, and adjust tube feedings and TPN/PPN based on assessed needs  - Assess need for intravenous fluids  - Provide specific nutrition/hydration education as appropriate  - Include patient/family/caregiver in decisions related to nutrition  Outcome: Adequate for Discharge     Problem: SUBSTANCE USE/ABUSE  Goal: Will have no detox symptoms and will verbalize plan for changing substance-related behavior  Description: INTERVENTIONS:  - Monitor physical status and assess for symptoms of withdrawal  - Administer medication as ordered  - Provide emotional support with 1 on 1 interaction with staff  - Encourage recovery focused program/ addiction education  - Assess for verbalization of changing behaviors related to substance abuse  - Initiate consults and referrals as appropriate (Case Management, Spiritual Care, etc )  Outcome: Adequate for Discharge  Goal: By discharge, will develop insight into their chemical dependency and sustain motivation to continue in recovery  Description: INTERVENTIONS:  - Attends all daily group sessions and scheduled AA groups  - Actively practices coping skills through participation in the therapeutic community and adherence to program rules  - Reviews and completes assignments from individual treatment plan  - Assist patient development of understanding of their personal cycle of addiction and relapse triggers  Outcome: Adequate for Discharge  Goal: By discharge, patient will have ongoing treatment plan addressing chemical dependency  Description: INTERVENTIONS:  - Assist patient with resources and/or appointments for ongoing recovery based living  Outcome: Adequate for Discharge     Problem: SLEEP DISTURBANCE  Goal: Will exhibit normal sleeping pattern  Description: Interventions:  -  Assess the patients sleep pattern, noting recent changes  - Administer medication as ordered  - Decrease environmental stimuli, including noise, as appropriate during the night  - Encourage the patient to actively participate in unit groups and or exercise during the day to enhance ability to achieve adequate sleep at night  - Assess the patient, in the morning, encouraging a description of sleep experience  Outcome: Adequate for Discharge     Problem: SELF CARE DEFICIT  Goal: Return ADL status to a safe level of function  Description: INTERVENTIONS:  - Administer medication as ordered  - Assess ADL deficits and provide assistive devices as needed  - Obtain PT/OT consults as needed  - Assist and instruct patient to increase activity and self care as tolerated  Outcome: Adequate for Discharge     Problem: Alteration in Thoughts and Perception  Goal: Treatment Goal: Gain control of psychotic behaviors/thinking, reduce/eliminate presenting symptoms and demonstrate improved reality functioning upon discharge  Outcome: Adequate for Discharge  Goal: Refrain from acting on delusional thinking/internal stimuli  Description: Interventions:  - Monitor patient closely, per order   - Utilize least restrictive measures   - Set reasonable limits, give positive feedback for acceptable   - Administer medications as ordered and monitor of potential side effects  Outcome: Adequate for Discharge  Goal: Agree to be compliant with medication regime, as prescribed and report medication side effects  Description: Interventions:  - Offer appropriate PRN medication and supervise ingestion; conduct AIMS, as needed   Outcome: Adequate for Discharge  Goal: Recognize dysfunctional thoughts, communicate reality-based thoughts at the time of discharge  Description: Interventions:  - Provide medication and psycho-education to assist patient in compliance and developing insight into his/her illness   Outcome: Adequate for Discharge     Problem: Ineffective Coping  Goal: Identifies ineffective coping skills  Outcome: Adequate for Discharge  Goal: Identifies healthy coping skills  Outcome: Adequate for Discharge  Goal: Demonstrates healthy coping skills  Outcome: Adequate for Discharge  Goal: Patient/Family participate in treatment and DC plans  Description: Interventions:  - Provide therapeutic environment  Outcome: Adequate for Discharge  Goal: Patient/Family verbalizes awareness of resources  Outcome: Adequate for Discharge  Goal: Understands least restrictive measures  Description: Interventions:  - Utilize least restrictive behavior  Outcome: Adequate for Discharge  Goal: Free from restraint events  Description: - Utilize least restrictive measures   - Provide behavioral interventions   - Redirect inappropriate behaviors   Outcome: Adequate for Discharge     Problem: Depression  Goal: Treatment Goal: Demonstrate behavioral control of depressive symptoms, verbalize feelings of improved mood/affect, and adopt new coping skills prior to discharge  Outcome: Adequate for Discharge  Goal: Refrain from harming self  Description: Interventions:  - Monitor patient closely, per order   - Supervise medication ingestion, monitor effects and side effects   Outcome: Adequate for Discharge  Goal: Refrain from isolation  Description: Interventions:  - Develop a trusting relationship   - Encourage socialization   Outcome: Adequate for Discharge  Goal: Refrain from self-neglect  Outcome: Adequate for Discharge     Problem: Anxiety  Goal: Anxiety is at manageable level  Description: Interventions:  - Assess and monitor patient's anxiety level  - Monitor for signs and symptoms (heart palpitations, chest pain, shortness of breath, headaches, nausea, feeling jumpy, restlessness, irritable, apprehensive)  - Collaborate with interdisciplinary team and initiate plan and interventions as ordered    - [place] patient to unit/surroundings  - Explain treatment plan  - Encourage participation in care  - Encourage verbalization of concerns/fears  - Identify coping mechanisms  - Assist in developing anxiety-reducing skills  - Administer/offer alternative therapies  - Limit or eliminate stimulants  Outcome: Adequate for Discharge     Problem: Ineffective Coping  Goal: Participates in unit activities  Description: Interventions:  - Provide therapeutic environment   - Provide required programming   - Redirect inappropriate behaviors   Outcome: Adequate for Discharge     Problem: DISCHARGE PLANNING - CARE MANAGEMENT  Goal: Discharge to post-acute care or home with appropriate resources  Description: INTERVENTIONS:  - Conduct assessment to determine patient/family and health care team treatment goals, and need for post-acute services based on payer coverage, community resources, and patient preferences, and barriers to discharge  - Address psychosocial, clinical, and financial barriers to discharge as identified in assessment in conjunction with the patient/family and health care team  - Arrange appropriate level of post-acute services according to patients   needs and preference and payer coverage in collaboration with the physician and health care team  - Communicate with and update the patient/family, physician, and health care team regarding progress on the discharge plan  - Arrange appropriate transportation to post-acute venues  Outcome: Adequate for Discharge

## 2021-04-07 NOTE — PROGRESS NOTES
Patient came to desk requesting PRN zyprexa 10 mg  She stated I need this PRN I can feel I am going to have a psychotic break  Patient took medication and returned to room and screamed  Continue to monitor

## 2021-04-07 NOTE — DISCHARGE SUMMARY
Discharge Summary - [facility] 53 y o  female MRN: [id number]  Unit/Bed#: [facility] 669-78 Encounter: [id number]     Admission Date: 4/7/2021         Discharge Date: No discharge date for patient encounter  Attending Psychiatrist: Talisha Bosch MD    Reason for Admission/HPI:     Schizoaffective Disorder, bipolar type  Post-traumatic stress disorder, chronic    Patient is a 53 y o  female patient admitted on a voluntary 201 commitment basis due to suicidal and homicidal ideation as well as psychosis  Per crisis evaluation completed by Crisis Worker Dakota Avina:    Pt presents due to SI/HI and psychosis  Pt is A&O X 4, medically clear, anxious, manic but  cooperative  Pt was able to answer assessment questions but did have difficulty identifying delusion from reality  Pt denies any current SI/HI or thoughts of harm here in the ED but admits to the report from her mother that she has been making suicidal and homicidal statements at home  Pt states she was having SI/HI because she wasn't eating and she didn't take her meds because she wasn't eating  Pt denies any AH, VH, or delusions but has made multiple statements here in the ED that lead us to believe she is having paranoid delusions but can not identify them   Pt is experiencing increased depression, increased anxiety, and increased irritability  Pt reports she feels somewhat better already having been given her meds  Pt is aware she is in need of further stabilization and is in agreement w/ I/P psych admission  Pt's voluntary paperwork and Rights were reviewed, pt signed her 12  Pt was offered a copy of her Rights and the Voluntary Pt Handout but requested both be placed on her chart      Per psychiatric evaluation completed by Dr Gerhardt Revel:    Cm Reeves is a 53 y o  female with a history of Schizoaffective Disorder who was admitted to the inpatient adult psychiatric unit on a voluntary 201 commitment basis due to manic symptoms, unstable mood, psychotic symptoms and disorganized behavior      On evaluation in the inpatient psychiatric unit Skylar Cohen is a very poor historian  Patient during the interview is unable to provide any historical information in a cohesive manner due to her labile mood and disorganized thought process  She does report she has not been taking her medications and then tried to explain why with giving a rather arduous story which was difficult to follow about when she came downstairs no one was there, she was living with somebody and somehow her mother was involved as well  This is reflective of patient's disorganized thought process and thought disorder  At some point storing the interview patient breaks down and cries for few seconds then begins speaking again  She has rambling speech and pressured thought which are completely disconnected  Patient does however state she stopped her medications which included Neurontin 600 mg 4 times a day, Klonopin 0 5 mg 3 times a day, Lexapro 10 mg daily, and Risperdal 2 mg b i d  In addition to getting an [place] injection which she does not know when she got last or when it will be next due or the dosage  Patient had reported thoughts of self-harm upon presentation in the emergency room however at this time denies active suicidal or homicidal ideation    Hospital Course: The patient was admitted to the inpatient psychiatric unit and started on every 7 minutes precautions  During the hospitalization the patient was attending individual therapy, group therapy, milieu therapy and occupational therapy  Psychiatric medications were titrated over the hospital stay  To address depressive symptoms, mood instability, irritability, manic symptoms and psychotic symptoms the patient was started on antidepressant Lexapro, mood stabilizer Neurontin, antipsychotic medication Risperdal and anxiolytic medication Klonopin  Prazosin 1mg HS was also prescribed for nightmares   Medication doses were titrated during the hospital course  Prior to beginning of treatment medications risks and benefits and possible side effects including risk of parkinsonian symptoms, Tardive Dyskinesia and metabolic syndrome related to treatment with antipsychotic medications, risk of suicidality and serotonin syndrome related to treatment with antidepressants and risks of misuse, abuse or dependence, sedation and respiratory depression related to treatment with benzodiazepine medications were reviewed with the patient  The patient verbalized understanding and agreement for treatment  Because Beryle Dolores has failed several discharges in the past and remained depressed and was still having periods of psychosis at the end of her stay, it was decided by the team that she would benefit from admission to extended acute care  Beryle Dolores was transferred to an Extended Acute Care unit for a long term inpatient psychiatric treatment  Mental Status at time of Discharge:     Appearance:  casually dressed   Behavior:  normal   Speech:  normal pitch and normal volume   Mood:  depressed   Affect:  normal   Thought Process:  normal   Thought Content:  normal   Perceptual Disturbances: intermittent auditory hallucinations   Risk Potential: Patient denies any suicidal or homicidal ideation  Sensorium:  person, place, time/date and situation   Cognition:  recent and remote memory grossly intact   Consciousness:  alert and awake    Attention: attention span and concentration were age appropriate   Insight:  fair   Judgment: fair   Gait/Station: normal gait/station and normal balance   Motor Activity: no abnormal movements     Admission Diagnosis:Depression [F32 9]    Discharge Diagnosis:   Active Problems:    * No active hospital problems  *  Resolved Problems:    * No resolved hospital problems   *        Lab results:  Admission on 03/16/2021, Discharged on 04/07/2021   Component Date Value    WBC 03/16/2021 9 70     RBC 03/16/2021 4 91     Hemoglobin 03/16/2021 14 9     Hematocrit 03/16/2021 45 1     MCV 03/16/2021 92     MCH 03/16/2021 30 3     MCHC 03/16/2021 33 0     RDW 03/16/2021 14 1     MPV 03/16/2021 7 5*    Platelets 08/60/4996 326     Neutrophils Relative 03/16/2021 73     Lymphocytes Relative 03/16/2021 18*    Monocytes Relative 03/16/2021 7     Eosinophils Relative 03/16/2021 1     Basophils Relative 03/16/2021 1     Neutrophils Absolute 03/16/2021 7 10*    Lymphocytes Absolute 03/16/2021 1 70     Monocytes Absolute 03/16/2021 0 70     Eosinophils Absolute 03/16/2021 0 10     Basophils Absolute 03/16/2021 0 00     Sodium 03/16/2021 137     Potassium 03/16/2021 4 1     Chloride 03/16/2021 97*    CO2 03/16/2021 28     ANION GAP 03/16/2021 12     BUN 03/16/2021 17     Creatinine 03/16/2021 0 73     Glucose 03/16/2021 122*    Calcium 03/16/2021 10 3     AST 03/16/2021 22     ALT 03/16/2021 19     Alkaline Phosphatase 03/16/2021 72     Total Protein 03/16/2021 8 1     Albumin 03/16/2021 4 8     Total Bilirubin 03/16/2021 0 50     eGFR 03/16/2021 97     Amph/Meth UR 03/16/2021 Positive*    Barbiturate Ur 03/16/2021 Negative     Benzodiazepine Urine 03/16/2021 Negative     Cocaine Urine 03/16/2021 Negative     Methadone Urine 03/16/2021 Negative     Opiate Urine 03/16/2021 Negative     PCP Ur 03/16/2021 Negative     THC Urine 03/16/2021 Negative     Oxycodone Urine 03/16/2021 Negative     EXT PREG TEST UR (Ref: N* 03/16/2021 negative     Control 03/16/2021 valid     Ethanol Lvl 03/16/2021 <10     SARS-CoV-2 03/16/2021 Negative     INFLUENZA A PCR 03/16/2021 Negative     INFLUENZA B PCR 03/16/2021 Negative     RSV PCR 03/16/2021 Negative     Sodium 03/17/2021 134     Potassium 03/17/2021 4 0     Chloride 03/17/2021 99     CO2 03/17/2021 29     ANION GAP 03/17/2021 6     BUN 03/17/2021 14     Creatinine 03/17/2021 0 56*    Glucose 03/17/2021 112*    Glucose, Fasting 03/17/2021 112*    Calcium 03/17/2021 9 4     AST 03/17/2021 27     ALT 03/17/2021 18     Alkaline Phosphatase 03/17/2021 67     Total Protein 03/17/2021 7 0     Albumin 03/17/2021 4 2     Total Bilirubin 03/17/2021 0 40     eGFR 03/17/2021 110     POC Glucose 03/23/2021 83     Hepatitis B Surface Ag 03/31/2021 Non-reactive     Hepatitis C Ab 03/31/2021 Non-reactive     Hep B C IgM 03/31/2021 Non-reactive     Hep B Core Total Ab 03/31/2021 Non-reactive     HIV-1/HIV-2 Ab 03/31/2021 Non-Reactive     Cholesterol 03/31/2021 300*    Triglycerides 03/31/2021 658*    HDL, Direct 03/31/2021 57     LDL Calculated 03/31/2021      RPR 03/31/2021 Non-Reactive     N gonorrhoeae, DNA Probe 03/31/2021 Negative     Chlamydia trachomatis, D* 03/31/2021 Negative     LDL Direct 03/31/2021 123 1*    Color, UA 04/04/2021 Yellow     Clarity, UA 04/04/2021 Clear     Specific Gravity, UA 04/04/2021 1 025     pH, UA 04/04/2021 5 5     Leukocytes, UA 04/04/2021 Negative     Nitrite, UA 04/04/2021 Negative     Protein, UA 04/04/2021 Negative     Glucose, UA 04/04/2021 Negative     Ketones, UA 04/04/2021 Negative     Urobilinogen, UA 04/04/2021 0 2     Bilirubin, UA 04/04/2021 Negative     Blood, UA 04/04/2021 Negative     SARS-CoV-2 04/06/2021 Negative     INFLUENZA A PCR 04/06/2021 Negative     INFLUENZA B PCR 04/06/2021 Negative     RSV PCR 04/06/2021 Negative        Discharge Medications:  Current Discharge Medication List         Current Discharge Medication List         Current Discharge Medication List         Current Discharge Medication List      CONTINUE these medications which have NOT CHANGED    Details   albuterol (2 5 mg/3 mL) 0 083 % nebulizer solution Take 1 vial (2 5 mg total) by nebulization every 6 (six) hours as needed for wheezing or shortness of breath  Qty: 120 vial, Refills: 5    Associated Diagnoses: Mild intermittent asthmatic bronchitis with acute exacerbation      albuterol (PROVENTIL HFA,VENTOLIN HFA) 90 mcg/act inhaler Inhale 2 puffs every 6 (six) hours as needed for wheezing or shortness of breath  Qty: 1 Inhaler, Refills: 5    Comments: Substitution to a formulary equivalent within the same pharmaceutical class is authorized  Associated Diagnoses: Mild intermittent asthmatic bronchitis with acute exacerbation      benztropine (COGENTIN) 1 mg tablet Take 1 tablet (1 mg total) by mouth every 6 (six) hours as needed for tremors (mild extrapyramidal symptoms)  Qty: 30 tablet, Refills: 0    Associated Diagnoses: Schizoaffective disorder, bipolar type ([facility])      clonazePAM (KlonoPIN) 0 5 mg tablet Take 1 tablet (0 5 mg total) by mouth 3 (three) times a day for 10 days  Qty: 30 tablet, Refills: 0    Associated Diagnoses: Schizoaffective disorder, bipolar type ([facility])      fluticasone (FLONASE) 50 mcg/act nasal spray 1 spray into each nostril daily  Qty: 1 Bottle, Refills: 5    Associated Diagnoses: Vasomotor rhinitis      gabapentin (NEURONTIN) 400 mg capsule Take 2 capsules (800 mg total) by mouth 3 (three) times a day  Qty: 180 capsule, Refills: 0    Associated Diagnoses: Schizoaffective disorder, bipolar type ([facility])      gabapentin (NEURONTIN) 600 MG tablet Take 600 mg by mouth daily at bedtime      lidocaine (LIDODERM) 5 % Apply 1 patch topically daily for 15 days Remove & Discard patch within 12 hours or as directed by MD  Qty: 15 patch, Refills: 0    Associated Diagnoses: Chronic midline low back pain without sciatica      oxybutynin (DITROPAN-XL) 5 mg 24 hr tablet Take 1 tablet (5 mg total) by mouth daily  Qty: 30 tablet, Refills: 0    Associated Diagnoses: Schizoaffective disorder, bipolar type ([facility] [address] )      ! ! risperiDONE (RisperDAL) 1 mg tablet Take 1 mg by mouth daily in the early morning      !! risperiDONE (RisperDAL) 2 mg tablet Take 2 mg by mouth daily at bedtime      simvastatin (ZOCOR) 20 mg tablet Take 1 tablet (20 mg total) by mouth daily at bedtime for 90 days  Qty: 30 tablet, Refills: 2 Associated Diagnoses: Hyperlipidemia, unspecified hyperlipidemia type      thiamine 100 MG tablet Take 1 tablet (100 mg total) by mouth daily  Qty: 30 tablet, Refills: 0    Associated Diagnoses: [facility]ffective disorder, [facility]       ! ! - Potential duplicate medications found  Please discuss with provider  Discharge instructions/Information to patient and family:   See after visit summary for information provided to patient and family  Provisions for Follow-Up Care:  See after visit summary for information related to follow-up care and any pertinent home health orders  Discharge Statement   I spent 30 minutes discharging the patient  This time was spent on the day of discharge  I had direct contact with the patient on the day of discharge  Additional documentation is required if more than 30 minutes were spent on discharge  I reviewed with Miriam Pandey importance of compliance with medications and outpatient treatment after discharge

## 2021-04-07 NOTE — TRANSPORTATION MEDICAL NECESSITY
Section I - General Information    Name of Patient: Sharan Monae                 : 1970    Medicare #: [id number]  Transport Date: 21 (PCS is valid for round trips on this date and for all repetitive trips in the 60-day range as noted below )  [facility] [address] [place] [facility] [address] [facility], [address]  Is the pt's stay covered under Medicare Part A (PPS/DRG)   []     Closest appropriate facility? If no, why is transport to more distant facility required? Yes  If hospice pt, is this transport related to pt's terminal illness? NA       Section II - Medical Necessity Questionnaire  Ambulance transportation is medically necessary only if other means of transport are contraindicated or would be potentially harmful to the patient  To meet this requirement, the patient must either be "bed confined" or suffer from a condition such that transport by means other than ambulance is contraindicated by the patient's condition  The following questions must be answered by the medical professional signing below for this form to be valid:    1)  Describe the MEDICAL CONDITION (physical and/or mental) of this patient AT [address] [place] [address] that requires the patient to be transported in an ambulance and why transport by other means is contraindicated by the patient's condition: Schizoaffective disorder bipolar type  2) Is the patient "bed confined" as defined below? No  To be "be confined" the patient must satisfy all three of the following conditions: (1) unable to get up from bed without Assistance; AND (2) unable to ambulate; AND (3) unable to sit in a chair or wheelchair  3) Can this patient safely be transported by car or wheelchair van (i e , seated during transport without a medical attendant or monitoring)?    No    4) In addition to completing questions 1-3 above, please check any of the following conditions that apply*:   *Note: supporting documentation for any boxes checked must be maintained in the patient's medical records  If hosp-hosp transfer, describe services needed at 2nd facility not available at 1st facility? Medical attendant required   Other(specify) inpatient mental health to inpatient mental health      Section III - Signature of Physician or Healthcare Professional  I certify that the above information is true and correct based on my evaluation of this patient, and represent that the patient requires transport by ambulance and that other forms of transport are contraindicated  I understand that this information will be used by the Centers for Medicare and Medicaid Services (CMS) to support the determination of medical necessity for ambulance services, and I represent that I have personal knowledge of the patient's condition at time of transport  []  If this box is checked, I also certify that the patient is physically or mentally incapable of signing the ambulance service's claim and that the institution with which I am affiliated has furnished care, services, or assistance to the patient  My signature below is made on behalf of the patient pursuant to 42 CFR §424 36(b)(4)  In accordance with 42 CFR §424 37, the specific reason(s) that the patient is physically or mentally incapable of signing the claim form is as follows: Analia Garrison Physician* or Healthcare Professional______________________________________________________________  Signature Date 04/07/21 (For scheduled repetitive transports, this form is not valid for transports performed more than 60 days after this date)    Printed Name & Credentials of Physician or Healthcare Professional (MD, DO, RN, etc )________________________________  *Form must be signed by patient's attending physician for scheduled, repetitive transports   For non-repetitive, unscheduled ambulance transports, if unable to obtain the signature of the attending physician, any of the following may sign (choose appropriate option below)  [] Physician Assistant []  Clinical Nurse Specialist []  Registered Nurse  []  Nurse Practitioner  [x] Discharge Planner

## 2021-04-07 NOTE — PLAN OF CARE
Problem: Nutrition/Hydration-ADULT  Goal: Nutrient/Hydration intake appropriate for improving, restoring or maintaining nutritional needs  Description: Monitor and assess patient's nutrition/hydration status for malnutrition  Collaborate with interdisciplinary team and initiate plan and interventions as ordered  Monitor patient's weight and dietary intake as ordered or per policy  Utilize nutrition screening tool and intervene as necessary  Determine patient's food preferences and provide high-protein, high-caloric foods as appropriate       INTERVENTIONS:  - Monitor oral intake, urinary output, labs, and treatment plans  - Assess nutrition and hydration status and recommend course of action  - Evaluate amount of meals eaten  - Assist patient with eating if necessary   - Allow adequate time for meals  - Recommend/ encourage appropriate diets, oral nutritional supplements, and vitamin/mineral supplements  - Order, calculate, and assess calorie counts as needed  - Recommend, monitor, and adjust tube feedings and TPN/PPN based on assessed needs  - Assess need for intravenous fluids  - Provide specific nutrition/hydration education as appropriate  - Include patient/family/caregiver in decisions related to nutrition  Outcome: Progressing     Problem: Alteration in Thoughts and Perception  Goal: Treatment Goal: Gain control of psychotic behaviors/thinking, reduce/eliminate presenting symptoms and demonstrate improved reality functioning upon discharge  Outcome: Progressing  Goal: Verbalize thoughts and feelings  Description: Interventions:  - Promote a nonjudgmental and trusting relationship with the patient through active listening and therapeutic communication  - Assess patient's level of functioning, behavior and potential for risk  - Engage patient in 1 on 1 interactions  - Encourage patient to express fears, feelings, frustrations, and discuss symptoms    - [place] patient to reality, help patient recognize reality-based thinking   - Administer medications as ordered and assess for potential side effects  - Provide the patient education related to the signs and symptoms of the illness and desired effects of prescribed medications  Outcome: Progressing  Goal: Refrain from acting on delusional thinking/internal stimuli  Description: Interventions:  - Monitor patient closely, per order   - Utilize least restrictive measures   - Set reasonable limits, give positive feedback for acceptable   - Administer medications as ordered and monitor of potential side effects  Outcome: Progressing  Goal: Agree to be compliant with medication regime, as prescribed and report medication side effects  Description: Interventions:  - Offer appropriate PRN medication and supervise ingestion; conduct AIMS, as needed   Outcome: Progressing     Problem: Ineffective Coping  Goal: Cooperates with admission process  Description: Interventions:   - Complete admission process  Outcome: Progressing  Goal: Understands least restrictive measures  Description: Interventions:  - Utilize least restrictive behavior  Outcome: Progressing     Problem: Ineffective Coping  Goal: Cooperates with admission process  Description: Interventions:   - Complete admission process  Outcome: Progressing  Goal: Understands least restrictive measures  Description: Interventions:  - Utilize least restrictive behavior  Outcome: Progressing     Problem: Risk for Self Injury/Neglect  Goal: Refrain from harming self  Description: Interventions:  - Monitor patient closely, per order  - Develop a trusting relationship  - Supervise medication ingestion, monitor effects and side effects   Outcome: Progressing     Problem: Depression  Goal: Refrain from harming self  Description: Interventions:  - Monitor patient closely, per order   - Supervise medication ingestion, monitor effects and side effects   Outcome: Progressing     Problem: Anxiety  Goal: Anxiety is at manageable level  Description: Interventions:  - Assess and monitor patient's anxiety level  - Monitor for signs and symptoms (heart palpitations, chest pain, shortness of breath, headaches, nausea, feeling jumpy, restlessness, irritable, apprehensive)  - Collaborate with interdisciplinary team and initiate plan and interventions as ordered    - [place] patient to unit/surroundings  - Explain treatment plan  - Encourage participation in care  - Encourage verbalization of concerns/fears  - Identify coping mechanisms  - Assist in developing anxiety-reducing skills  - Administer/offer alternative therapies  - Limit or eliminate stimulants  Outcome: Progressing     Problem: Risk for Violence/Aggression Toward Others  Goal: Verbalize thoughts and feelings  Description: Interventions:  - Assess and re-assess patient's level of risk, every waking shift  - Engage patient in 1:1 interactions, daily, for a minimum of 15 minutes   - Allow patient to express feelings and frustrations in a safe and non-threatening manner   - Establish rapport/trust with patient   Outcome: Progressing  Goal: Control angry outbursts  Description: Interventions:  - Monitor patient closely, per order  - Ensure early verbal de-escalation  - Monitor prn medication needs  - Set reasonable/therapeutic limits, outline behavioral expectations, and consequences   - Provide a non-threatening milieu, utilizing the least restrictive interventions   Outcome: Progressing     Problem: Alteration in Orientation  Goal: Interact with staff daily  Description: Interventions:  - Assess and re-assess patient's level of orientation  - Engage patient in 1 on 1 interactions, daily, for a minimum of 15 minutes   - Establish rapport/trust with patient   Outcome: Progressing

## 2021-04-07 NOTE — NURSING NOTE
Patient discharged via litter and transport team to [address] [place] [address] at this time with discharge instructions and all belongings

## 2021-04-08 PROCEDURE — 99222 1ST HOSP IP/OBS MODERATE 55: CPT | Performed by: PSYCHIATRY & NEUROLOGY

## 2021-04-08 PROCEDURE — 93005 ELECTROCARDIOGRAM TRACING: CPT

## 2021-04-08 PROCEDURE — 99252 IP/OBS CONSLTJ NEW/EST SF 35: CPT | Performed by: PHYSICIAN ASSISTANT

## 2021-04-08 RX ADMIN — CARBAMIDE PEROXIDE 6.5% 5 DROP: 6.5 LIQUID AURICULAR (OTIC) at 08:25

## 2021-04-08 RX ADMIN — GABAPENTIN 600 MG: 300 CAPSULE ORAL at 08:23

## 2021-04-08 RX ADMIN — RISPERIDONE 4 MG: 2 TABLET ORAL at 22:08

## 2021-04-08 RX ADMIN — GABAPENTIN 600 MG: 300 CAPSULE ORAL at 17:24

## 2021-04-08 RX ADMIN — ACETAMINOPHEN 650 MG: 325 TABLET ORAL at 18:53

## 2021-04-08 RX ADMIN — FENOFIBRATE 145 MG: 145 TABLET ORAL at 08:24

## 2021-04-08 RX ADMIN — RISPERIDONE 3 MG: 2 TABLET ORAL at 08:23

## 2021-04-08 RX ADMIN — LORAZEPAM 1 MG: 1 TABLET ORAL at 16:25

## 2021-04-08 RX ADMIN — GABAPENTIN 600 MG: 300 CAPSULE ORAL at 12:48

## 2021-04-08 RX ADMIN — DIPHENHYDRAMINE HCL 25 MG: 25 TABLET ORAL at 22:08

## 2021-04-08 RX ADMIN — ESCITALOPRAM OXALATE 20 MG: 10 TABLET ORAL at 08:23

## 2021-04-08 RX ADMIN — CARBAMIDE PEROXIDE 6.5% 5 DROP: 6.5 LIQUID AURICULAR (OTIC) at 17:25

## 2021-04-08 RX ADMIN — PRAZOSIN HYDROCHLORIDE 1 MG: 1 CAPSULE ORAL at 22:08

## 2021-04-08 RX ADMIN — BENZTROPINE MESYLATE 1 MG: 1 TABLET ORAL at 22:08

## 2021-04-08 RX ADMIN — CLONAZEPAM 0.5 MG: 0.5 TABLET ORAL at 12:48

## 2021-04-08 RX ADMIN — LIDOCAINE 1 PATCH: 50 PATCH CUTANEOUS at 08:21

## 2021-04-08 RX ADMIN — NICOTINE 1 PATCH: 21 PATCH, EXTENDED RELEASE TRANSDERMAL at 08:21

## 2021-04-08 RX ADMIN — GABAPENTIN 600 MG: 300 CAPSULE ORAL at 22:08

## 2021-04-08 RX ADMIN — BENZTROPINE MESYLATE 1 MG: 1 TABLET ORAL at 15:23

## 2021-04-08 RX ADMIN — OLANZAPINE 2.5 MG: 5 TABLET, ORALLY DISINTEGRATING ORAL at 15:23

## 2021-04-08 RX ADMIN — CLONAZEPAM 0.5 MG: 0.5 TABLET ORAL at 22:08

## 2021-04-08 RX ADMIN — CLONAZEPAM 0.5 MG: 0.5 TABLET ORAL at 08:23

## 2021-04-08 NOTE — NURSING NOTE
Pt is medication and meal compliant  Pt is visible in the milieu, but keeps to self  Pt engages in conversation with staff when approached, but states " im just nervous, I dont think any of this will work and help me"  Pt then became tearful, but stated she was " going to be okay, ill talk to my  soon"  Pt remains anxious and seclusive  Will continue to monitor

## 2021-04-08 NOTE — ASSESSMENT & PLAN NOTE
· Patient currently denies SOB, wheezing     · No evidence of acute resp distress/wheezing on exam  · Continue albuterol inhaler PRN SOB, wheezing

## 2021-04-08 NOTE — PROGRESS NOTES
04/08/21 1100   Activity/Group Checklist   Group   (IMR)   Attendance Attended   Attendance Duration (min) 46-60   Interactions Interacted appropriately   Affect/Mood Appropriate;Bright;Calm;Normal range   Goals Achieved Identified feelings; Able to listen to others; Able to engage in interactions; Able to reflect/comment on own behavior

## 2021-04-08 NOTE — ASSESSMENT & PLAN NOTE
Patient seen and examined today, medically clear for admission to [facility]  Currently there are no acute medical needs; consult if acute medical needs arise
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
## 2021-04-08 NOTE — NURSING NOTE
Lucas León was cooperative with admission process  Alert and oriented x 3  Pt ate 100% supper  Compliant with scheduled 1700/1800 meds  Pt slept after supper till 2215  Initially difficult to arouse  Pt stated that she was very tired from being up early today and that she goes into a very deep sleep from her meds also  Cranberry juice and snack given per pt request   2200 Prazosin held per MD BP parameters  Pt compliant with all other scheduled 2100/2200 meds  Pt showered  Pt signed portion of necessary admission forms but would not sign all forms  Mood labile  Continue to monitor/assess for any changes

## 2021-04-08 NOTE — NURSING NOTE
Patient was in her room screaming very loudly stating "I feel like I am going crazy" In bed in the fetal position  Ativan 1mg given as po prn at 1650 which reduced the anxiety substantially within forty minutes where she was in the dining room eating and smiling  Patient came to nurses station agitated and cursing  She said the people here were "sickening" She also c/o headache #5  She received Tylenol 650 mg at 1855  She refused to elaborate what was "sickening" about peers   Will monitor

## 2021-04-08 NOTE — NURSING NOTE
Susie Wayne maintained on ongoing SAFE precaution without incident on this shift  Laying in bed with her eyes closed, breath even and unlabored  Q 7 minutes checks implanted continues  Behavioral control  Adjusting well to environment  She woke up at [facility] 20 c/o about having a nightmare  She is wearing a nicotine patch on her right deltoid  Educated her on the nicotine patch can cause nightmare during REM and the needs of removal prior to going to sleep  She show that she understood but still wanted to wear the patch until she take a shower  This writer also reminded her that she must hand over her old patch to her nurse before getting a new patch  Denies any depression or anxiety  No overt delusion or a/t/v hallucination noted  Will continue to monitor

## 2021-04-08 NOTE — NURSING NOTE
Prn zyprexa po and congentin for c/o muscle stiffness and increased agitation  Pt pacing, responding to internal stimuli, cursing, yelling at self, and becoming angry pulling at hair and rubbing forehead  Pt currently sitting in room  Will continue to monitor
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
## 2021-04-08 NOTE — PROGRESS NOTES
04/08/21 0900   Activity/Group Checklist   Group Community meeting   Attendance Did not attend   Attendance Duration (min) 16-30   Affect/Mood ASHLEY

## 2021-04-08 NOTE — ASSESSMENT & PLAN NOTE
· Originally admitted to inpatient psych for unstable mood, psychosis, SIs  · Currently 201 status  · Failed several discharges in past- extended acute care  · Management per psych

## 2021-04-08 NOTE — PROGRESS NOTES
04/08/21 0700   Activity/Group Checklist   Group   (Coffee Talk )   Attendance Attended   Attendance Duration (min) 46-60   Interactions Interacted appropriately   Affect/Mood Appropriate;Bright;Normal range   Goals Achieved Identified feelings; Able to listen to others; Able to engage in interactions

## 2021-04-08 NOTE — SOCIAL WORK
Patient signed Mike Oliveira for [facility] and Dr Michelle Gamboa, her daughter Martha Cao, [address] [facility], and [address] of Assessment and Taxes (for [employer] verification only)

## 2021-04-08 NOTE — SOCIAL WORK
Met with patient to initiate bio psycho social assessment  Patient was agreeable to meet  Upon approach, patient is irritable and reactive, in process of acclimating to unit  Patient has already disclosed to staff h/o her previous trauma, her diagnosis  Jen Ped initially stating to  "I really need some help, I need change in my life    I'm in a bad place  I really hope it works this time   "  Patient's speech pressured and tangential  She reported several times that she "really hopes" she can turn things around "this time " Provided sporadic details of her past, though hard to really shape her story due to flight of thoughts and inability to stay focused  Patient mostly rambled without allowing SW to interject; even upon asking her questions she struggled to answer and continued on her train of thought  SW did obtain ROIS for LESLIE Vela, daughter, and [facility]  Patient asked about admission paperwork and stated "I won't sign anything from those nurses   " and then started asking questions about her 201 commitment  SW was able to address  Patient stated she like to go to a group home after EAC  Patient also spoke briefly about "people" telling her she is an alcoholic, then went on to deny she has a problem with alcohol or any other drugs  "I don't even drink that much  It's been a long time since I really drank like that " Per records, however, there appears to be a chronic h/o alcohol abuse, and also h/o methamphetamine use  Due to her irritability and struggle to fully engage at this time, SW will attempt to meet with patient another time to obtain additional history and further assess  Will also follow up with LESLIE Vela, who has been working with patient on and off for many years

## 2021-04-09 LAB
ALBUMIN SERPL BCP-MCNC: 4 G/DL (ref 3–5.2)
ALP SERPL-CCNC: 60 U/L (ref 43–122)
ALT SERPL W P-5'-P-CCNC: 17 U/L
ANION GAP SERPL CALCULATED.3IONS-SCNC: 7 MMOL/L (ref 5–14)
AST SERPL W P-5'-P-CCNC: 21 U/L (ref 14–36)
ATRIAL RATE: 81 BPM
BILIRUB SERPL-MCNC: 0.2 MG/DL
BUN SERPL-MCNC: 15 MG/DL (ref 5–25)
CALCIUM SERPL-MCNC: 9.6 MG/DL (ref 8.4–10.2)
CHLORIDE SERPL-SCNC: 103 MMOL/L (ref 97–108)
CO2 SERPL-SCNC: 29 MMOL/L (ref 22–30)
CREAT SERPL-MCNC: 0.58 MG/DL (ref 0.6–1.2)
ERYTHROCYTE [DISTWIDTH] IN BLOOD BY AUTOMATED COUNT: 14.3 %
GFR SERPL CREATININE-BSD FRML MDRD: 109 ML/MIN/1.73SQ M
GLUCOSE P FAST SERPL-MCNC: 91 MG/DL (ref 70–99)
GLUCOSE SERPL-MCNC: 91 MG/DL (ref 70–99)
HCT VFR BLD AUTO: 39.5 % (ref 36–46)
HGB BLD-MCNC: 13 G/DL (ref 12–16)
MCH RBC QN AUTO: 30.1 PG (ref 26–34)
MCHC RBC AUTO-ENTMCNC: 32.9 G/DL (ref 31–36)
MCV RBC AUTO: 92 FL (ref 80–100)
P AXIS: 70 DEGREES
PLATELET # BLD AUTO: 282 THOUSANDS/UL (ref 150–450)
PMV BLD AUTO: 7.3 FL (ref 8.9–12.7)
POTASSIUM SERPL-SCNC: 5 MMOL/L (ref 3.6–5)
PR INTERVAL: 154 MS
PROT SERPL-MCNC: 6.9 G/DL (ref 5.9–8.4)
QRS AXIS: 22 DEGREES
QRSD INTERVAL: 78 MS
QT INTERVAL: 422 MS
QTC INTERVAL: 490 MS
RBC # BLD AUTO: 4.32 MILLION/UL (ref 4–5.2)
SODIUM SERPL-SCNC: 139 MMOL/L (ref 137–147)
T WAVE AXIS: 47 DEGREES
TSH SERPL DL<=0.05 MIU/L-ACNC: 3.81 UIU/ML (ref 0.47–4.68)
VENTRICULAR RATE: 81 BPM
WBC # BLD AUTO: 6.8 THOUSAND/UL (ref 4.5–11)

## 2021-04-09 PROCEDURE — 85027 COMPLETE CBC AUTOMATED: CPT | Performed by: PHYSICIAN ASSISTANT

## 2021-04-09 PROCEDURE — 80053 COMPREHEN METABOLIC PANEL: CPT | Performed by: PHYSICIAN ASSISTANT

## 2021-04-09 PROCEDURE — 93010 ELECTROCARDIOGRAM REPORT: CPT | Performed by: INTERNAL MEDICINE

## 2021-04-09 PROCEDURE — 84443 ASSAY THYROID STIM HORMONE: CPT | Performed by: PHYSICIAN ASSISTANT

## 2021-04-09 PROCEDURE — 99232 SBSQ HOSP IP/OBS MODERATE 35: CPT | Performed by: PSYCHIATRY & NEUROLOGY

## 2021-04-09 RX ORDER — CLONAZEPAM 0.5 MG/1
0.5 TABLET ORAL 2 TIMES DAILY
Status: DISCONTINUED | OUTPATIENT
Start: 2021-04-09 | End: 2021-04-13

## 2021-04-09 RX ORDER — RISPERIDONE 2 MG/1
4 TABLET, FILM COATED ORAL 2 TIMES DAILY
Status: DISCONTINUED | OUTPATIENT
Start: 2021-04-09 | End: 2021-04-13

## 2021-04-09 RX ORDER — RISPERIDONE 2 MG/1
4 TABLET, FILM COATED ORAL 2 TIMES DAILY
Status: DISCONTINUED | OUTPATIENT
Start: 2021-04-09 | End: 2021-04-09

## 2021-04-09 RX ORDER — LORAZEPAM 2 MG/ML
1 INJECTION INTRAMUSCULAR EVERY 8 HOURS PRN
Status: DISCONTINUED | OUTPATIENT
Start: 2021-04-09 | End: 2021-04-13

## 2021-04-09 RX ADMIN — GABAPENTIN 600 MG: 300 CAPSULE ORAL at 11:47

## 2021-04-09 RX ADMIN — GABAPENTIN 600 MG: 300 CAPSULE ORAL at 21:13

## 2021-04-09 RX ADMIN — CLONAZEPAM 0.5 MG: 0.5 TABLET ORAL at 08:23

## 2021-04-09 RX ADMIN — NICOTINE 1 PATCH: 21 PATCH, EXTENDED RELEASE TRANSDERMAL at 08:23

## 2021-04-09 RX ADMIN — BENZTROPINE MESYLATE 1 MG: 1 TABLET ORAL at 21:13

## 2021-04-09 RX ADMIN — FENOFIBRATE 145 MG: 145 TABLET ORAL at 08:23

## 2021-04-09 RX ADMIN — ESCITALOPRAM OXALATE 20 MG: 10 TABLET ORAL at 08:23

## 2021-04-09 RX ADMIN — RISPERIDONE 3 MG: 2 TABLET ORAL at 08:23

## 2021-04-09 RX ADMIN — GABAPENTIN 600 MG: 300 CAPSULE ORAL at 08:23

## 2021-04-09 RX ADMIN — CLONAZEPAM 0.5 MG: 0.5 TABLET ORAL at 21:13

## 2021-04-09 RX ADMIN — ACETAMINOPHEN 650 MG: 325 TABLET ORAL at 06:17

## 2021-04-09 RX ADMIN — LIDOCAINE 1 PATCH: 50 PATCH CUTANEOUS at 08:22

## 2021-04-09 RX ADMIN — BENZTROPINE MESYLATE 1 MG: 1 TABLET ORAL at 13:11

## 2021-04-09 RX ADMIN — DIPHENHYDRAMINE HCL 25 MG: 25 TABLET ORAL at 21:12

## 2021-04-09 RX ADMIN — OLANZAPINE 10 MG: 10 TABLET, ORALLY DISINTEGRATING ORAL at 13:08

## 2021-04-09 RX ADMIN — GABAPENTIN 600 MG: 300 CAPSULE ORAL at 17:09

## 2021-04-09 RX ADMIN — PRAZOSIN HYDROCHLORIDE 1 MG: 1 CAPSULE ORAL at 21:13

## 2021-04-09 RX ADMIN — GLYCERIN, PETROLATUM, PHENYLEPHRINE HCL, PRAMOXINE HCL 1 APPLICATION: 144; 2.5; 10; 15 CREAM TOPICAL at 16:31

## 2021-04-09 RX ADMIN — RISPERIDONE 4 MG: 2 TABLET ORAL at 20:57

## 2021-04-09 NOTE — PROGRESS NOTES
04/09/21 1100   Activity/Group Checklist   Group Community meeting   Attendance Attended   Attendance Duration (min) 16-30   Interactions Other (Comment)  (irritable, venting)   Affect/Mood Angry   Goals Achieved Identified feelings; Identified triggers

## 2021-04-09 NOTE — NURSING NOTE
Kay Ross maintained on ongoing SAFE precaution without incident on this shift  She is visible with irritable edge, liable, loud and unpredictable mood  She was reluctant to have the EKG done, but after she receive an explanation she agree to have the EKG done  Kay Ross is walking around with her robe on but denies feeling cold  Post meds assessment of tylenol 650mg, Kay Ross stated that her headache was a 3/10 and that she believes that the previous nurse gave her too much of tylenol  "I feel dopie I think she gave me too much tylenol"  This writer explain to her that was the correct prescribe dose  And if she chooses ask for one tylenol instead  After snack Kay Ross went to bed, sleeping sounding  Unable to wake Kay Ross up for her HS meds, attempted 7 times to get her up  Her SPO2 @ 91%RA   On the 8th attempt, this writer and nurse in charge literally had to sit her up in bed, talking to her and arousing her to wake up  Kay Ross appears to be very sedated  House supervisor made aware  She finally woke up enough to use the bathroom, gait a little unsteady  She stated that she feels I feel groggy in the head, I'm sorry"  She did take her meds and was walk back to her room  Nicotine patch remove and discarded as well as her lidoderm patch  Denies depression or anxiety  No overt delusion or a/t/v hallucination noted  Will continue to monitor

## 2021-04-09 NOTE — PLAN OF CARE
Problem: Nutrition/Hydration-ADULT  Goal: Nutrient/Hydration intake appropriate for improving, restoring or maintaining nutritional needs  Description: Monitor and assess patient's nutrition/hydration status for malnutrition  Collaborate with interdisciplinary team and initiate plan and interventions as ordered  Monitor patient's weight and dietary intake as ordered or per policy  Utilize nutrition screening tool and intervene as necessary  Determine patient's food preferences and provide high-protein, high-caloric foods as appropriate       INTERVENTIONS:  - Monitor oral intake, urinary output, labs, and treatment plans  - Assess nutrition and hydration status and recommend course of action  - Evaluate amount of meals eaten  - Assist patient with eating if necessary   - Allow adequate time for meals  - Recommend/ encourage appropriate diets, oral nutritional supplements, and vitamin/mineral supplements  - Order, calculate, and assess calorie counts as needed  - Recommend, monitor, and adjust tube feedings and TPN/PPN based on assessed needs  - Assess need for intravenous fluids  - Provide specific nutrition/hydration education as appropriate  - Include patient/family/caregiver in decisions related to nutrition  Outcome: Progressing     Problem: Alteration in Thoughts and Perception  Goal: Verbalize thoughts and feelings  Description: Interventions:  - Promote a nonjudgmental and trusting relationship with the patient through active listening and therapeutic communication  - Assess patient's level of functioning, behavior and potential for risk  - Engage patient in 1 on 1 interactions  - Encourage patient to express fears, feelings, frustrations, and discuss symptoms    - [place] patient to reality, help patient recognize reality-based thinking   - Administer medications as ordered and assess for potential side effects  - Provide the patient education related to the signs and symptoms of the illness and desired effects of prescribed medications  Outcome: Progressing  Goal: Refrain from acting on delusional thinking/internal stimuli  Description: Interventions:  - Monitor patient closely, per order   - Utilize least restrictive measures   - Set reasonable limits, give positive feedback for acceptable   - Administer medications as ordered and monitor of potential side effects  Outcome: Progressing  Goal: Agree to be compliant with medication regime, as prescribed and report medication side effects  Description: Interventions:  - Offer appropriate PRN medication and supervise ingestion; conduct AIMS, as needed   Outcome: Progressing  Goal: Attend and participate in unit activities, including therapeutic, recreational, and educational groups  Description: Interventions:  -Encourage Visitation and family involvement in care  Outcome: Progressing     Problem: Ineffective Coping  Goal: Demonstrates healthy coping skills  Outcome: Progressing  Goal: Participates in unit activities  Description: Interventions:  - Provide therapeutic environment   - Provide required programming   - Redirect inappropriate behaviors   Outcome: Progressing  Goal: Understands least restrictive measures  Description: Interventions:  - Utilize least restrictive behavior  Outcome: Progressing     Problem: Risk for Self Injury/Neglect  Goal: Refrain from harming self  Description: Interventions:  - Monitor patient closely, per order  - Develop a trusting relationship  - Supervise medication ingestion, monitor effects and side effects   Outcome: Progressing  Goal: Attend and participate in unit activities, including therapeutic, recreational, and educational groups  Description: Interventions:  - Provide therapeutic and educational activities daily, encourage attendance and participation, and document same in the medical record  - Obtain collateral information, encourage visitation and family involvement in care   Outcome: Progressing     Problem: Depression  Goal: Verbalize thoughts and feelings  Description: Interventions:  - Assess and re-assess patient's level of risk   - Engage patient in 1:1 interactions, daily, for a minimum of 15 minutes   - Encourage patient to express feelings, fears, frustrations, hopes   Outcome: Progressing  Goal: Refrain from isolation  Description: Interventions:  - Develop a trusting relationship   - Encourage socialization   Outcome: Progressing  Goal: Attend and participate in unit activities, including therapeutic, recreational, and educational groups  Description: Interventions:  - Provide therapeutic and educational activities daily, encourage attendance and participation, and document same in the medical record   Outcome: Progressing     Problem: Anxiety  Goal: Anxiety is at manageable level  Description: Interventions:  - Assess and monitor patient's anxiety level  - Monitor for signs and symptoms (heart palpitations, chest pain, shortness of breath, headaches, nausea, feeling jumpy, restlessness, irritable, apprehensive)  - Collaborate with interdisciplinary team and initiate plan and interventions as ordered    - [place] patient to unit/surroundings  - Explain treatment plan  - Encourage participation in care  - Encourage verbalization of concerns/fears  - Identify coping mechanisms  - Assist in developing anxiety-reducing skills  - Administer/offer alternative therapies  - Limit or eliminate stimulants  Outcome: Progressing     Problem: Risk for Violence/Aggression Toward Others  Goal: Control angry outbursts  Description: Interventions:  - Monitor patient closely, per order  - Ensure early verbal de-escalation  - Monitor prn medication needs  - Set reasonable/therapeutic limits, outline behavioral expectations, and consequences   - Provide a non-threatening milieu, utilizing the least restrictive interventions   Outcome: Progressing     Problem: Alteration in Orientation  Goal: Interact with staff daily  Description: Interventions:  - Assess and re-assess patient's level of orientation  - Engage patient in 1 on 1 interactions, daily, for a minimum of 15 minutes   - Establish rapport/trust with patient   Outcome: Progressing

## 2021-04-09 NOTE — PROGRESS NOTES
04/09/21 1019   Team Meeting   Meeting Type Tx Team Meeting   Initial Conference Date 04/09/21   Next Conference Date 04/13/21   Team Members Present   Team Members Present Physician;Nurse;   Physician Team Member Dr Dmitriy Ornelas MD   Nursing Team Member Rodrigue Garcia RN   Social Work Team Member Anna HOPSON   Patient/Family Present   Patient Present Yes   Patient's Family Present No     Summary  Patient participated in her treatment team meeting this morning  Patient is very irritable, reactive, and labile at this time  Patient spent a good portion of the meeting asking about her medications; discussion took place about medication changes and treatment goals  Patient defensive, and was unable to calmly engage in discussion (raised voice, yelling at doctor, accusatory, etc)  Patient also denies her alcoholism as an issue, despite her act team's report that she is a chronic alcoholic and is "always" drunk or drinking when they contact her  Patient stated she wants to remain on the benzodiazapine though the plan is to titrate off of this along with a few other medication changes, including introducing a mood stabilizer  Patient reviewed and signed her treatment plan  She is a 201 commitment, and as per our discussion yesterday has stated she would like to find a group home following E[facility], since she has been intermittently homeless and unable to maintain housing on her own  Next review due by May 9th

## 2021-04-09 NOTE — NURSING NOTE
Charlie Miguel c/o of 7/10 Headache complain of feeling foggy in the head and prefer to only take 2 tabs instead of 3 tabs that is recommended for severe headache  PRN Acetaminophen  650mg rendered 0617   10 minutes after she received PRN for her headache she stormed out of the dinning room and into her room yelling on top of her lung "This place is fuck up""I'm sick of it"  This writer approach her to ask her what was wrong  She stated that I might be having hallucination" what kind of hallucination? Tactile, tasting weird stuff"  " I feel like I'm losing my mind"  She storm back into the dinning room, sat for 3 minutes and started yelling again "This fucking place is nut" "you can't get coffee""they wake you up for fucking blood work"  She approach the nurse's station demanding PRN for her anxiety  PRN atarx was offered to her but she refused yelling "I don't take atarax, I told you guys that" Storm back into her room and yelled out "forget it  Forget everything you bitch" "Y'all fucking moron running this place"  I ask her if she used her coping skill  Yelling back at this writer "meditation"  "I mediated"  I ask her to use her coping skill  She storm back into her room, change her clothes and she has been quiet thus far  Will continue to monitor

## 2021-04-09 NOTE — PROGRESS NOTES
04/09/21 0830   Team Meeting   Meeting Type Daily Rounds   Initial Conference Date 04/09/21   Patient/Family Present   Patient Present No   Patient's Family Present No     Daily Rounds Documentation     Team Members Present:   MD Fabian Patel, DECLAN SamaniegoW  MARK ANTHONY Espino    Does not agree with this admission  Somatic, demanding, tactile hallucinations, and rapid mood shifts  Required several PRNs yesterday evening  Attended 4/6 groups  Compliant with medications and meals  Slept

## 2021-04-09 NOTE — PROGRESS NOTES
Psychiatry Progress Note Paulie Hill [age] y o  female MRN: [id number]  Unit/Bed#: [facility] 462-92 Encounter: [id number]  Code Status: Level 1 - Full Code    PCP: Naty Calvin DO    Date of Admission:  4/7/2021 1435   Date of Service:  04/09/21    Patient Active Problem List   Diagnosis    Schizoaffective disorder, bipolar type ([facility] )    Uncomplicated alcohol dependence ([facility] )    Suicidal behavior    LYNN (generalized anxiety disorder)    Slow transit constipation    Deficiency of vitamin B    Degenerative disc disease, lumbar    Post-traumatic stress disorder, chronic    Lower extremity weakness    Generalized abdominal pain    Non-intractable vomiting    Medical clearance for psychiatric admission    Carbuncle    Alcohol dependence with unspecified alcohol-induced disorder ([facility] )    Mild intermittent asthma without complication    Tobacco abuse       Review of systems:   Was difficult to arouse in the  evening and did wake up finally possibly from p r n  meds he received earlier and was and what was somewhat agitated and responding    In denial of the incident from yesterday claiming that she was able to wake up  Diagnosis: schizoaffective bipolar, alcohol abuse episode    Assessment   Overall Status:  was exhibiting mood swings and needed p r n  meds Was observed to respond to internal stimuli crying  And was labile staying to herself   Certification Statement: The patient will continue to require additional inpatient hospital stay due to ongoing mood swings paranoia   Acceptance by patient:  accepting    Hopefulness in recovery:  Living in a group home   Understanding of medications: has good understanding   Involved in reintegration process:  Adjusting to the unit   Trusting in relationship with psychiatrist:  trusting   Justification for dual anti-psychotics:  Not applicable   Side effects from treatment: none    Medication changes    Refuses any mood stabilizers claiming she wont take lithium, depakote or tegretol and will decrease klonopin as 0 5 mg one po bid due to hx of alcohol use and incidence of not being abl eto be aroused easily yesterday evening  Finally she agreed to give it a try with lithium when reminded that she needs a mood stabilizer    Non-pharmacological treatments   Continue with individual, group, milieu and occupational therapy using recovery principles and psycho-education about accepting illness and the need for treatment  Safety   Safety and communication plan established to target dynamic risk factors discussed above  Discharge Plan     most likely to a group home with the act team    Interval Progress    patient has made a good adjustment to the unit  She has been friendly pleasant cooperative but does difficult to arouse in the evening possibly from receiving p r n  meds her earlier for actively responding and displaying mood swings trying to loner hair and appearing in emotional distress  Has been observed to talk to herself and appearing paranoid silverman agitated  Somewhat demanding, complaining of tactile hallucinations, displayed mood swings   Agitated over klonopin being lowered demanding she should then be transferred to a different hospital     Sleep: fair   appetite: good   compliance with medications: good   Side  Effects:  Slight sedation from p r n    significant events:  Was difficult to arouse in the evening for after receiving p r n  and still responding paranoid suspicious   group attendance: 4/6 groups    Mental Status Exam  Appearance: age appropriate, casually dressed, adequate grooming, looks older than stated age, underweight  Behavior: normal, pleasant, cooperative, appears anxious  Speech: fluent, coherent, increased rate, pressured, hypertalkative,   Circumstantial pressured  Mood: depressed, anxious, irritable, euphoric  Affect: tearful, inappropriate, labile, reactive  Thought Process: organized, goal directed but circumstantial tangential disorganized scattered pressured  Thought Content: no overt delusions but does appear as if paranoid  No current suicidal homicidal thoughts intent or plans verbalized  No phobias obsessions compulsions or distorted body perceptions elicited  Perceptual Disturbances: no auditory hallucinations, no visual hallucinations  Hx Risk Factors: chronic mood disorder, chronic psychotic symptoms, alcohol use  Sensorium:   Alert and oriented x3 spheres and to situation}  Cognition: recent and remote memory grossly intact  Consciousness: alert and awake  Attention: attention span and concentration are age appropriate  Intellect: appears to be of average intelligence  Insight: poor  Judgement: impaired  Motor Activity: no abnormal movements     Vitals  Temp:  [97 9 °F (36 6 °C)-98 2 °F (36 8 °C)] 98 2 °F (36 8 °C)  HR:  [80-97] 89  Resp:  [16-19] 16  BP: (101-126)/(60-86) 125/75  No intake or output data in the 24 hours ending 04/09/21 0547    Lab Results:  [facility] Admission Reviewed      Current Facility-Administered Medications   Medication Dose Route Frequency Provider Last Rate    acetaminophen  650 mg Oral Q6H PRN Edward Philly Lodics, CRNP      acetaminophen  650 mg Oral Q4H PRN Edward Philly Lodics, CRNP      acetaminophen  975 mg Oral Q6H PRN Edward Philly Lodics, CRNP      albuterol  2 puff Inhalation Q6H PRN Edward Philly Lodics, CRNP      benztropine  1 mg Intramuscular Q4H PRN Max 6/day Isis N Lodics, CRNP      benztropine  1 mg Oral Q4H PRN Max 6/day Isis N Lodics, CRNP      benztropine  1 mg Oral HS Isis N Lodics, CRNP      calcium carbonate  500 mg Oral TID PRN Edward Philly Lodics, CRNP      clonazePAM  0 5 mg Oral TID Isis N Lodics, CRNP      diphenhydrAMINE  25 mg Oral HS Isis N Lodics, CRNP      escitalopram  20 mg Oral Daily Isis N Lodics, CRNP      fenofibrate  145 mg Oral Daily Isis N Lodics, CRNP      gabapentin  600 mg Oral 4x Daily Isis Porterics, CRNP      haloperidol  5 mg Oral Q6H PRN Nadyady [place] Germanics, CRNP      hydrOXYzine HCL  25 mg Oral Q6H PRN Max 4/day Isis Porterics, CRNP      hydrOXYzine HCL  50 mg Oral Q6H PRN Max 4/day Isis Porterics, CRNP      lidocaine  1 patch Topical Daily Isis Porterics, CRNP      LORazepam  2 mg Intramuscular Q8H PRN Isis Porterics, CRNP      LORazepam  1 mg Oral Q8H PRN Isis Porterics, CRNP      magnesium hydroxide  30 mL Oral Daily PRN Marly [place] Germanics, CRNP      nicotine  1 patch Transdermal Daily Isis Porterics, CRNP      OLANZapine  10 mg Oral Q3H PRN Isis Porterics, CRNP      OLANZapine  2 5 mg Oral Q8H PRN Isis Porterics, CRNP      OLANZapine  5 mg Oral Q3H PRN Isis Porterics, CRNP      OLANZapine  10 mg Intramuscular Q3H PRN Isis Porterics, CRNP      OLANZapine  5 mg Intramuscular Q3H PRN Isis Porterics, CRNP      Pramox-PE-Glycerin-Petrolatum  1 application Rectal 4x Daily PRN Isis Porterics, CRNP      prazosin  1 mg Oral HS Isis Porterics, CRNP      risperiDONE  3 mg Oral Daily Isis Lama, CRNP      risperiDONE  4 mg Oral HS Isis Lama, CRNP         Counseling / Coordination of Care: Total floor / unit time spent today 15 minutes  Greater than 50% of total time was spent with the patient and / or family counseling and / or somewhat receptive to supportive listening and teaching positive coping skills to deal with symptom mangement  Patient's Rights, confidentiality and exceptions to confidentiality, use of automated medical record, [facility] staff access to medical record, and consent to treatment reviewed  This note has been dictated

## 2021-04-09 NOTE — PLAN OF CARE
Problem: Nutrition/Hydration-ADULT  Goal: Nutrient/Hydration intake appropriate for improving, restoring or maintaining nutritional needs  Description: Monitor and assess patient's nutrition/hydration status for malnutrition  Collaborate with interdisciplinary team and initiate plan and interventions as ordered  Monitor patient's weight and dietary intake as ordered or per policy  Utilize nutrition screening tool and intervene as necessary  Determine patient's food preferences and provide high-protein, high-caloric foods as appropriate       INTERVENTIONS:  - Monitor oral intake, urinary output, labs, and treatment plans  - Assess nutrition and hydration status and recommend course of action  - Evaluate amount of meals eaten  - Assist patient with eating if necessary   - Allow adequate time for meals  - Recommend/ encourage appropriate diets, oral nutritional supplements, and vitamin/mineral supplements  - Order, calculate, and assess calorie counts as needed  - Recommend, monitor, and adjust tube feedings and TPN/PPN based on assessed needs  - Assess need for intravenous fluids  - Provide specific nutrition/hydration education as appropriate  - Include patient/family/caregiver in decisions related to nutrition  Outcome: Not Progressing     Problem: Alteration in Thoughts and Perception  Goal: Treatment Goal: Gain control of psychotic behaviors/thinking, reduce/eliminate presenting symptoms and demonstrate improved reality functioning upon discharge  Outcome: Not Progressing  Goal: Verbalize thoughts and feelings  Description: Interventions:  - Promote a nonjudgmental and trusting relationship with the patient through active listening and therapeutic communication  - Assess patient's level of functioning, behavior and potential for risk  - Engage patient in 1 on 1 interactions  - Encourage patient to express fears, feelings, frustrations, and discuss symptoms    - [place] patient to reality, help patient recognize reality-based thinking   - Administer medications as ordered and assess for potential side effects  - Provide the patient education related to the signs and symptoms of the illness and desired effects of prescribed medications  Outcome: Not Progressing  Goal: Refrain from acting on delusional thinking/internal stimuli  Description: Interventions:  - Monitor patient closely, per order   - Utilize least restrictive measures   - Set reasonable limits, give positive feedback for acceptable   - Administer medications as ordered and monitor of potential side effects  Outcome: Not Progressing  Goal: Agree to be compliant with medication regime, as prescribed and report medication side effects  Description: Interventions:  - Offer appropriate PRN medication and supervise ingestion; conduct AIMS, as needed   Outcome: Not Progressing  Goal: Attend and participate in unit activities, including therapeutic, recreational, and educational groups  Description: Interventions:  -Encourage Visitation and family involvement in care  Outcome: Not Progressing  Goal: Recognize dysfunctional thoughts, communicate reality-based thoughts at the time of discharge  Description: Interventions:  - Provide medication and psycho-education to assist patient in compliance and developing insight into his/her illness   Outcome: Not Progressing  Goal: Complete daily ADLs, including personal hygiene independently, as able  Description: Interventions:  - Observe, teach, and assist patient with ADLS  - Monitor and promote a balance of rest/activity, with adequate nutrition and elimination   Outcome: Not Progressing     Problem: Ineffective Coping  Goal: Cooperates with admission process  Description: Interventions:   - Complete admission process  Outcome: Not Progressing  Goal: Identifies ineffective coping skills  Outcome: Not Progressing  Goal: Identifies healthy coping skills  Outcome: Not Progressing  Goal: Demonstrates healthy coping skills  Outcome: Not Progressing  Goal: Participates in unit activities  Description: Interventions:  - Provide therapeutic environment   - Provide required programming   - Redirect inappropriate behaviors   Outcome: Not Progressing  Goal: Patient/Family participate in treatment and DC plans  Description: Interventions:  - Provide therapeutic environment  Outcome: Not Progressing  Goal: Patient/Family verbalizes awareness of resources  Outcome: Not Progressing  Goal: Understands least restrictive measures  Description: Interventions:  - Utilize least restrictive behavior  Outcome: Not Progressing  Goal: Free from restraint events  Description: - Utilize least restrictive measures   - Provide behavioral interventions   - Redirect inappropriate behaviors   Outcome: Not Progressing     Problem: Risk for Self Injury/Neglect  Goal: Treatment Goal: Remain safe during length of stay, learn and adopt new coping skills, and be free of self-injurious ideation, impulses and acts at the time of discharge  Outcome: Not Progressing  Goal: Verbalize thoughts and feelings  Description: Interventions:  - Assess and re-assess patient's lethality and potential for self-injury  - Engage patient in 1:1 interactions, daily, for a minimum of 15 minutes  - Encourage patient to express feelings, fears, frustrations, hopes  - Establish rapport/trust with patient   Outcome: Not Progressing  Goal: Refrain from harming self  Description: Interventions:  - Monitor patient closely, per order  - Develop a trusting relationship  - Supervise medication ingestion, monitor effects and side effects   Outcome: Not Progressing  Goal: Attend and participate in unit activities, including therapeutic, recreational, and educational groups  Description: Interventions:  - Provide therapeutic and educational activities daily, encourage attendance and participation, and document same in the medical record  - Obtain collateral information, encourage visitation and family involvement in care   Outcome: Not Progressing  Goal: Recognize maladaptive responses and adopt new coping mechanisms  Outcome: Not Progressing  Goal: Complete daily ADLs, including personal hygiene independently, as able  Description: Interventions:  - Observe, teach, and assist patient with ADLS  - Monitor and promote a balance of rest/activity, with adequate nutrition and elimination  Outcome: Not Progressing     Problem: Depression  Goal: Treatment Goal: Demonstrate behavioral control of depressive symptoms, verbalize feelings of improved mood/affect, and adopt new coping skills prior to discharge  Outcome: Not Progressing  Goal: Verbalize thoughts and feelings  Description: Interventions:  - Assess and re-assess patient's level of risk   - Engage patient in 1:1 interactions, daily, for a minimum of 15 minutes   - Encourage patient to express feelings, fears, frustrations, hopes   Outcome: Not Progressing  Goal: Refrain from harming self  Description: Interventions:  - Monitor patient closely, per order   - Supervise medication ingestion, monitor effects and side effects   Outcome: Not Progressing  Goal: Refrain from isolation  Description: Interventions:  - Develop a trusting relationship   - Encourage socialization   Outcome: Not Progressing  Goal: Refrain from self-neglect  Outcome: Not Progressing  Goal: Attend and participate in unit activities, including therapeutic, recreational, and educational groups  Description: Interventions:  - Provide therapeutic and educational activities daily, encourage attendance and participation, and document same in the medical record   Outcome: Not Progressing  Goal: Complete daily ADLs, including personal hygiene independently, as able  Description: Interventions:  - Observe, teach, and assist patient with ADLS  -  Monitor and promote a balance of rest/activity, with adequate nutrition and elimination   Outcome: Not Progressing     Problem: Anxiety  Goal: Anxiety is at manageable level  Description: Interventions:  - Assess and monitor patient's anxiety level  - Monitor for signs and symptoms (heart palpitations, chest pain, shortness of breath, headaches, nausea, feeling jumpy, restlessness, irritable, apprehensive)  - Collaborate with interdisciplinary team and initiate plan and interventions as ordered    - [place] patient to unit/surroundings  - Explain treatment plan  - Encourage participation in care  - Encourage verbalization of concerns/fears  - Identify coping mechanisms  - Assist in developing anxiety-reducing skills  - Administer/offer alternative therapies  - Limit or eliminate stimulants  Outcome: Not Progressing     Problem: Risk for Violence/Aggression Toward Others  Goal: Treatment Goal: Refrain from acts of violence/aggression during length of stay, and demonstrate improved impulse control at the time of discharge  Outcome: Not Progressing  Goal: Verbalize thoughts and feelings  Description: Interventions:  - Assess and re-assess patient's level of risk, every waking shift  - Engage patient in 1:1 interactions, daily, for a minimum of 15 minutes   - Allow patient to express feelings and frustrations in a safe and non-threatening manner   - Establish rapport/trust with patient   Outcome: Not Progressing  Goal: Refrain from harming others  Outcome: Not Progressing  Goal: Refrain from destructive acts on the environment or property  Outcome: Not Progressing  Goal: Control angry outbursts  Description: Interventions:  - Monitor patient closely, per order  - Ensure early verbal de-escalation  - Monitor prn medication needs  - Set reasonable/therapeutic limits, outline behavioral expectations, and consequences   - Provide a non-threatening milieu, utilizing the least restrictive interventions   Outcome: Not Progressing  Goal: Attend and participate in unit activities, including therapeutic, recreational, and educational groups  Description: Interventions:  - Provide therapeutic and educational activities daily, encourage attendance and participation, and document same in the medical record   Outcome: Not Progressing  Goal: Identify appropriate positive anger management techniques  Description: Interventions:  - Offer anger management and coping skills groups   - Staff will provide positive feedback for appropriate anger control  Outcome: Not Progressing     Problem: Alteration in Orientation  Goal: Treatment Goal: Demonstrate a reduction of confusion and improved orientation to person, place, time and/or situation upon discharge, according to optimum baseline/ability  Outcome: Not Progressing  Goal: Interact with staff daily  Description: Interventions:  - Assess and re-assess patient's level of orientation  - Engage patient in 1 on 1 interactions, daily, for a minimum of 15 minutes   - Establish rapport/trust with patient   Outcome: Not Progressing  Goal: Express concerns related to confused thinking related to:  Description: Interventions:  - Encourage patient to express feelings, fears, frustrations, hopes  - Assign consistent caregivers   - [place]/re-orient patient as needed  - Allow comfort items, as appropriate  - Provide visual cues, signs, etc    Outcome: Not Progressing  Goal: Allow medical examinations, as recommended  Description: Interventions:  - Provide physical/neurological exams and/or referrals, per provider   Outcome: Not Progressing  Goal: Cooperate with recommended testing/procedures  Description: Interventions:  - Determine need for ancillary testing  - Observe for mental status changes  - Implement falls/precaution protocol   Outcome: Not Progressing  Goal: Attend and participate in unit activities, including therapeutic, recreational, and educational groups  Description: Interventions:  - Provide therapeutic and educational activities daily, encourage attendance and participation, and document same in the medical record   - Provide appropriate opportunities for reminiscence   - Provide a consistent daily routine   - Encourage family contact/visitation   Outcome: Not Progressing  Goal: Complete daily ADLs, including personal hygiene independently, as able  Description: Interventions:  - Observe, teach, and assist patient with ADLS  Outcome: Not Progressing     Problem: Individualized Interventions  Goal: Patient will verbalize appropriate use of telephone within 5 days  Description: Interventions:  - Treatment team to determine use of supervised phone privileges   Outcome: Not Progressing  Goal: Patient will verbalize need for hospitalization and will no longer attempt elopement within 5 days  Description: Interventions:  - Ongoing education to help patient understand need for hospitalization  Outcome: Not Progressing  Goal: Patient will recognize inappropriate behaviors and develop alternative behaviors within 5 days  Description: Interventions:  - Patient in collaboration with Treatment Team will develop a behavior management plan to help identify effective coping skills to deal with stressors  Outcome: Not Progressing     Problem: DISCHARGE PLANNING - CARE MANAGEMENT  Goal: Discharge to post-acute care or home with appropriate resources  Description: INTERVENTIONS:  - Conduct assessment to determine patient/family and health care team treatment goals, and need for post-acute services based on payer coverage, community resources, and patient preferences, and barriers to discharge  - Address psychosocial, clinical, and financial barriers to discharge as identified in assessment in conjunction with the patient/family and health care team  - Arrange appropriate level of post-acute services according to patients   needs and preference and payer coverage in collaboration with the physician and health care team  - Communicate with and update the patient/family, physician, and health care team regarding progress on the discharge plan  - Arrange appropriate transportation to post-acute venues  Outcome: Not Progressing

## 2021-04-09 NOTE — PROGRESS NOTES
04/09/21 0700   Activity/Group Checklist   Group   (Coffee talk )   Attendance Attended   Attendance Duration (min) 46-60   Interactions Interacted appropriately   Affect/Mood Appropriate;Bright;Calm;Normal range   Goals Achieved Identified feelings; Able to listen to others; Able to engage in interactions

## 2021-04-09 NOTE — PROGRESS NOTES
04/09/21 0900   Activity/Group Checklist   Group Community meeting   Attendance Attended   Attendance Duration (min) 46-60   Interactions Interacted appropriately   Affect/Mood Appropriate;Calm;Normal range   Goals Achieved Identified feelings; Able to listen to others; Able to engage in interactions

## 2021-04-09 NOTE — NURSING NOTE
Patient is out and present in the milieu, and is only social with select peers  Patient is irritable, and is overly concerned about her medication  Patient approached the nursing station this morning requesting PRNs of haldol, cogentin, and ativan without offering a reason as to why she needed the PRNs  Patient was informed to wait until morning medication pass due to the medication that she receives in the morning  Patient accepted the answer, but her facial expression displayed that she was irritated by the response  Patient denies SI/HI/AH/VH, and tries to refrain from arguing with staff and peers  Patient become agitated around 1300 yelling out loud, and swearing  When patient was asked what happened patient just stated, "That was fucking nasty, I can't believe that happened "  But, then would not provide any other response to what had happened  Patient was offered her PRN of Zyprexa and cogentin to assist with her current agitation around 1308  PRN appeared to be effective around 1415 for patient was able to keep her tones within a decent range

## 2021-04-09 NOTE — PROGRESS NOTES
04/09/21 1000   Activity/Group Checklist   Group   (Journaling/Electronics )   Attendance Attended   Attendance Duration (min) 46-60   Interactions Interacted appropriately   Affect/Mood Appropriate;Calm;Normal range   Goals Achieved Able to engage in interactions; Able to listen to others

## 2021-04-09 NOTE — NURSING NOTE
Jacinda Susieanastasiya was somewhat easier to arouse to wake up this morning for her scheduled fasting blood work: TSH, 3rd generation w/free T4 reflex, CMP, CBC and Platelet obtain with pending result

## 2021-04-09 NOTE — NURSING NOTE
Charlie Rivera maintained on ongoing SAFE precaution without incident on this shift  Laying in bed with eyes closed, breath even and unlabored  Q 7 minutes rounding implemented continuously  No behavioral noted  Will continue to monitor

## 2021-04-09 NOTE — PLAN OF CARE
Problem: Alteration in Thoughts and Perception  Goal: Verbalize thoughts and feelings  Description: Interventions:  - Promote a nonjudgmental and trusting relationship with the patient through active listening and therapeutic communication  - Assess patient's level of functioning, behavior and potential for risk  - Engage patient in 1 on 1 interactions  - Encourage patient to express fears, feelings, frustrations, and discuss symptoms    - [place] patient to reality, help patient recognize reality-based thinking   - Administer medications as ordered and assess for potential side effects  - Provide the patient education related to the signs and symptoms of the illness and desired effects of prescribed medications  Outcome: Progressing  Goal: Refrain from acting on delusional thinking/internal stimuli  Description: Interventions:  - Monitor patient closely, per order   - Utilize least restrictive measures   - Set reasonable limits, give positive feedback for acceptable   - Administer medications as ordered and monitor of potential side effects  Outcome: Progressing  Goal: Agree to be compliant with medication regime, as prescribed and report medication side effects  Description: Interventions:  - Offer appropriate PRN medication and supervise ingestion; conduct AIMS, as needed   Outcome: Progressing  Goal: Attend and participate in unit activities, including therapeutic, recreational, and educational groups  Description: Interventions:  -Encourage Visitation and family involvement in care  Outcome: Progressing     Problem: Ineffective Coping  Goal: Free from restraint events  Description: - Utilize least restrictive measures   - Provide behavioral interventions   - Redirect inappropriate behaviors   Outcome: Progressing     Problem: Risk for Self Injury/Neglect  Goal: Refrain from harming self  Description: Interventions:  - Monitor patient closely, per order  - Develop a trusting relationship  - Supervise medication ingestion, monitor effects and side effects   Outcome: Progressing  Goal: Attend and participate in unit activities, including therapeutic, recreational, and educational groups  Description: Interventions:  - Provide therapeutic and educational activities daily, encourage attendance and participation, and document same in the medical record  - Obtain collateral information, encourage visitation and family involvement in care   Outcome: Progressing  Goal: Complete daily ADLs, including personal hygiene independently, as able  Description: Interventions:  - Observe, teach, and assist patient with ADLS  - Monitor and promote a balance of rest/activity, with adequate nutrition and elimination  Outcome: Progressing     Problem: Depression  Goal: Refrain from harming self  Description: Interventions:  - Monitor patient closely, per order   - Supervise medication ingestion, monitor effects and side effects   Outcome: Progressing  Goal: Refrain from isolation  Description: Interventions:  - Develop a trusting relationship   - Encourage socialization   Outcome: Progressing  Goal: Refrain from self-neglect  Outcome: Progressing  Goal: Complete daily ADLs, including personal hygiene independently, as able  Description: Interventions:  - Observe, teach, and assist patient with ADLS  -  Monitor and promote a balance of rest/activity, with adequate nutrition and elimination   Outcome: Progressing     Problem: Anxiety  Goal: Anxiety is at manageable level  Description: Interventions:  - Assess and monitor patient's anxiety level  - Monitor for signs and symptoms (heart palpitations, chest pain, shortness of breath, headaches, nausea, feeling jumpy, restlessness, irritable, apprehensive)  - Collaborate with interdisciplinary team and initiate plan and interventions as ordered    - [place] patient to unit/surroundings  - Explain treatment plan  - Encourage participation in care  - Encourage verbalization of concerns/fears  - Identify coping mechanisms  - Assist in developing anxiety-reducing skills  - Administer/offer alternative therapies  - Limit or eliminate stimulants  Outcome: Progressing     Problem: Risk for Violence/Aggression Toward Others  Goal: Verbalize thoughts and feelings  Description: Interventions:  - Assess and re-assess patient's level of risk, every waking shift  - Engage patient in 1:1 interactions, daily, for a minimum of 15 minutes   - Allow patient to express feelings and frustrations in a safe and non-threatening manner   - Establish rapport/trust with patient   Outcome: Progressing  Goal: Refrain from destructive acts on the environment or property  Outcome: Not Progressing  Goal: Control angry outbursts  Description: Interventions:  - Monitor patient closely, per order  - Ensure early verbal de-escalation  - Monitor prn medication needs  - Set reasonable/therapeutic limits, outline behavioral expectations, and consequences   - Provide a non-threatening milieu, utilizing the least restrictive interventions   Outcome: Not Progressing  Goal: Attend and participate in unit activities, including therapeutic, recreational, and educational groups  Description: Interventions:  - Provide therapeutic and educational activities daily, encourage attendance and participation, and document same in the medical record   Outcome: Progressing  Goal: Identify appropriate positive anger management techniques  Description: Interventions:  - Offer anger management and coping skills groups   - Staff will provide positive feedback for appropriate anger control  Outcome: Not Progressing     Problem: Alteration in Orientation  Goal: Interact with staff daily  Description: Interventions:  - Assess and re-assess patient's level of orientation  - Engage patient in 1 on 1 interactions, daily, for a minimum of 15 minutes   - Establish rapport/trust with patient   Outcome: Not Progressing  Goal: Attend and participate in unit activities, including therapeutic, recreational, and educational groups  Description: Interventions:  - Provide therapeutic and educational activities daily, encourage attendance and participation, and document same in the medical record   - Provide appropriate opportunities for reminiscence   - Provide a consistent daily routine   - Encourage family contact/visitation   Outcome: Progressing  Goal: Complete daily ADLs, including personal hygiene independently, as able  Description: Interventions:  - Observe, teach, and assist patient with ADLS  Outcome: Progressing

## 2021-04-09 NOTE — NURSING NOTE
Nohemy Tidwell has been awake, alert, and visible intermittently out in the milieu  Pt needs frequent reminders not to slam her door of room  Pt requested prn Preparation H hemorrhoid cream and given at 1631 and effective  Mood irritable and labile  Makes phone calls  Pt stated that she did not want to take the scheduled Risperdal 4 mg po at 1800 but wants to take it at bedtime because it makes her too tired after supper and requested writer to notify Dr Katja Null about this  Dr Katja Null texted,informed, and order received for Risperdal 4 mg po 2 times a day to give starting at [address]  Pt informed  Compliant with all scheduled meds  Ate 75% supper  Social with select peers while sitting in dining room during therapeutic activities and spent time coloring  Attended evening wrap up group and had evening snack  Continue to monitor/assess for any changes

## 2021-04-10 PROCEDURE — 99232 SBSQ HOSP IP/OBS MODERATE 35: CPT | Performed by: NURSE PRACTITIONER

## 2021-04-10 RX ADMIN — BENZTROPINE MESYLATE 1 MG: 1 TABLET ORAL at 21:21

## 2021-04-10 RX ADMIN — CLONAZEPAM 0.5 MG: 0.5 TABLET ORAL at 21:21

## 2021-04-10 RX ADMIN — RISPERIDONE 4 MG: 2 TABLET ORAL at 08:34

## 2021-04-10 RX ADMIN — GABAPENTIN 600 MG: 300 CAPSULE ORAL at 08:34

## 2021-04-10 RX ADMIN — NICOTINE 1 PATCH: 21 PATCH, EXTENDED RELEASE TRANSDERMAL at 08:36

## 2021-04-10 RX ADMIN — FENOFIBRATE 145 MG: 145 TABLET ORAL at 08:34

## 2021-04-10 RX ADMIN — GABAPENTIN 600 MG: 300 CAPSULE ORAL at 21:21

## 2021-04-10 RX ADMIN — GABAPENTIN 600 MG: 300 CAPSULE ORAL at 17:01

## 2021-04-10 RX ADMIN — CLONAZEPAM 0.5 MG: 0.5 TABLET ORAL at 08:34

## 2021-04-10 RX ADMIN — DIPHENHYDRAMINE HCL 25 MG: 25 TABLET ORAL at 21:21

## 2021-04-10 RX ADMIN — GABAPENTIN 600 MG: 300 CAPSULE ORAL at 12:19

## 2021-04-10 RX ADMIN — BENZTROPINE MESYLATE 1 MG: 1 TABLET ORAL at 13:34

## 2021-04-10 RX ADMIN — OLANZAPINE 2.5 MG: 5 TABLET, ORALLY DISINTEGRATING ORAL at 13:34

## 2021-04-10 RX ADMIN — LIDOCAINE 1 PATCH: 50 PATCH CUTANEOUS at 08:34

## 2021-04-10 RX ADMIN — RISPERIDONE 4 MG: 2 TABLET ORAL at 21:00

## 2021-04-10 RX ADMIN — ESCITALOPRAM OXALATE 20 MG: 10 TABLET ORAL at 08:34

## 2021-04-10 NOTE — PROGRESS NOTES
Progress Note - Behavioral Health   Masoud Henderson 53 y o  female MRN: [id number]  Unit/Bed#: [facility] 110-02 Encounter: [id number]    This note was not shared with the patient due to reasonable likelihood of causing patient harm     The patient was seen for continuing care and reviewed with treatment team   Staff reports denial significant changes in the past 24 hours  Patient was observed writing in the milieu  She was pleasant and cooperative  Upon approach, displayed good eye contact but was brief and guarded in conversation  Denies depression and moderate anxiety noted  Denies suicidal and homicidal ideations  Denies perceptual disturbances and does not appear to be responding to internal stimuli  Does not appear agitated  Reports that her sleep, appetite, and energy are adequate  Compliant with medication and states that she believes that she gets headaches from the medication but does not want any pain medication for the headaches  Mental Status Evaluation:  Appearance:  Adequate hygiene and grooming   Behavior:  cooperative and friendly   Mood:  anxious   Affect: constricted   Speech: Normal rate and Normal volume   Thought Process:  Goal directed and coherent   Thought Content:  Does not verbalize delusional material   Perceptual Disturbances: Denies hallucinations and does not appear to be responding to internal stimuli   Risk Potential: No suicidal or homicidal ideation   Attention/Concentration attention span and concentration were age appropriate   Orientation:   Alert and awake   Gait/Station: normal gait/station and normal balance   Motor Activity: No abnormal movement noted     Progress Toward Goals:  No change  Assessment/Plan    Principal Problem:    Schizoaffective disorder, bipolar type (HCC)  Active Problems:    Post-traumatic stress disorder, chronic    Medical clearance for psychiatric admission    Mild intermittent asthma without complication      Recommended Treatment:   1  Continue with pharmacotherapy, group therapy, milieu therapy and occupational therapy  2  Continue with safety checks per unit protocol  3  No medication changes at this time   The patient will be maintained on the following medications:    Current Facility-Administered Medications   Medication Dose Route Frequency Provider Last Rate    acetaminophen  650 mg Oral Q6H PRN [facility] Lodics, CRNP      acetaminophen  650 mg Oral Q4H PRN [facility] Lodics, CRNP      acetaminophen  975 mg Oral Q6H PRN [facility] Lodics, CRNP      albuterol  2 puff Inhalation Q6H PRN [facility] Lodics, CRNP      benztropine  1 mg Intramuscular Q4H PRN Max 6/day Isis Porterics, CRNP      benztropine  1 mg Oral Q4H PRN Max 6/day Isis Porterics, CRNP      benztropine  1 mg Oral HS Isis Porterics, CRNP      calcium carbonate  500 mg Oral TID PRN [facility] Lodics, CRNP      clonazePAM  0 5 mg Oral BID Eze Plata MD      diphenhydrAMINE  25 mg Oral HS Isis Porterics, CRNP      escitalopram  20 mg Oral Daily Isis Lama, CRNP      fenofibrate  145 mg Oral Daily Isis Lama, CRNP      gabapentin  600 mg Oral 4x Daily Isis Lama, CRNP      haloperidol  5 mg Oral Q6H PRN [facility] Germanics, CRNP      hydrOXYzine HCL  25 mg Oral Q6H PRN Max 4/day Isis Porterics, CRNP      hydrOXYzine HCL  50 mg Oral Q6H PRN Max 4/day Isis Porterics, CRNP      lidocaine  1 patch Topical Daily Isis Porterics, CRNP      LORazepam  1 mg Intramuscular Q8H PRN Eze Plata MD      LORazepam  1 mg Oral Q8H PRN Isis Lama, CRNP      magnesium hydroxide  30 mL Oral Daily PRN Ensign MicheleBlakeslee Germanics, CRNP      nicotine  1 patch Transdermal Daily Isis Porterics, CRNP      OLANZapine  10 mg Oral Q3H PRN Isis Porterics, CRNP      OLANZapine  2 5 mg Oral Q8H PRN Isis Porterics, CRNP      OLANZapine  5 mg Oral Q3H PRN Isis Porterics, CRNP      OLANZapine  10 mg Intramuscular Q3H PRN [facility] Germanics, CRNP      OLANZapine  5 mg Intramuscular Q3H PRN Isis Lama, CRNP      Pramox-PE-Glycerin-Petrolatum  1 application Rectal 4x Daily PRN Isis Lama, CRKALLIE      prazosin  1 mg Oral HS Isis Lama, JARED      risperiDONE  4 mg Oral BID Gage Hernández MD         Risks, benefits and possible side effects of Medications:   Risks, benefits, and possible side effects of medications explained to patient and patient verbalizes understanding  Portions of the record may have been created with voice recognition software  Occasional wrong word or "sound a like" substitutions may have occurred due to the inherent limitations of voice recognition software  Read the chart carefully and recognize, using context, where substitutions have occurred

## 2021-04-10 NOTE — PROGRESS NOTES
04/09/21 1400   Activity/Group Checklist   Group Other (Comment)  (Group Art Therapy/Open Choice, Process Discussion)   Attendance Attended   Attendance Duration (min) 31-45   Interactions Interacted appropriately   Affect/Mood Other (Comment); Incongruent  (Presetned as cooperative; stormed out of group )   Goals Achieved Able to listen to others; Able to engage in interactions  (Able to briefly engage materials; spontaneous anger)

## 2021-04-10 NOTE — NURSING NOTE
Patient is visible for short periods of time then reverts back to her room   She presents as mostly calm and pleasant  with an irritable edge She went to bed at 2000 and slept so soundly it was difficult to rouse her (on report, this is her "norm") Minipress held at HS; hypotension parameters

## 2021-04-10 NOTE — NURSING NOTE
Patient became agitated and stated she feels she is becoming psychotic  She was screaming and yelling in her room  Patient asked for PRN Zyprexa 2 5 mg  and cogentin   Patient was calmer and tried to calm down coloring in day room and walking  Will continue to monitor

## 2021-04-10 NOTE — PLAN OF CARE
Attended [address]  Initially sat quietly during group game, looking at materials from the [employer]  With time to acclimate self, was able to participate in group game and eventually take the lead  Benefits from peer and therapist modeling  Being new to unit is also a barrier, as she does not appear to know everybody  Was asking individual names  Peers were receptive and welcoming  Continues to benefit from group setting for a sense of community and interacting within a peer group

## 2021-04-10 NOTE — NURSING NOTE
Patient is pleasant and cooperative with the care and medications  Patient was calm, brightened on approach  Patient stated" she has and had horrible and hard life"  Nurse tried to encourage her to use breathing techniques and positive imaginary and practice daily to help her with anxiety  Patient was visible on the unit, made multiple phone calls and did not  ask for PRN   She came to the groups

## 2021-04-10 NOTE — NURSING NOTE
Linda Hinojosa maintained on ongoing SAFE precaution without incident on this shift  Laying in bed with eyes closed, breath even and unlabored  Q 7 minutes rounding implemented continuously  No behavioral noted  Will continue to monitor

## 2021-04-11 PROCEDURE — 99232 SBSQ HOSP IP/OBS MODERATE 35: CPT | Performed by: NURSE PRACTITIONER

## 2021-04-11 PROCEDURE — 99231 SBSQ HOSP IP/OBS SF/LOW 25: CPT | Performed by: PHYSICIAN ASSISTANT

## 2021-04-11 PROCEDURE — 99232 SBSQ HOSP IP/OBS MODERATE 35: CPT | Performed by: PHYSICIAN ASSISTANT

## 2021-04-11 RX ADMIN — GABAPENTIN 600 MG: 300 CAPSULE ORAL at 17:30

## 2021-04-11 RX ADMIN — RISPERIDONE 4 MG: 2 TABLET ORAL at 20:53

## 2021-04-11 RX ADMIN — ESCITALOPRAM OXALATE 20 MG: 10 TABLET ORAL at 08:38

## 2021-04-11 RX ADMIN — CLONAZEPAM 0.5 MG: 0.5 TABLET ORAL at 08:39

## 2021-04-11 RX ADMIN — ALBUTEROL SULFATE 2 PUFF: 90 AEROSOL, METERED RESPIRATORY (INHALATION) at 13:22

## 2021-04-11 RX ADMIN — ACETAMINOPHEN 650 MG: 325 TABLET ORAL at 10:40

## 2021-04-11 RX ADMIN — FENOFIBRATE 145 MG: 145 TABLET ORAL at 08:39

## 2021-04-11 RX ADMIN — CLONAZEPAM 0.5 MG: 0.5 TABLET ORAL at 20:53

## 2021-04-11 RX ADMIN — HYDROXYZINE HYDROCHLORIDE 50 MG: 50 TABLET, FILM COATED ORAL at 20:45

## 2021-04-11 RX ADMIN — MAGNESIUM HYDROXIDE 30 ML: 400 SUSPENSION ORAL at 15:49

## 2021-04-11 RX ADMIN — BENZTROPINE MESYLATE 1 MG: 1 TABLET ORAL at 20:54

## 2021-04-11 RX ADMIN — GABAPENTIN 600 MG: 300 CAPSULE ORAL at 20:53

## 2021-04-11 RX ADMIN — ACETAMINOPHEN 650 MG: 325 TABLET, FILM COATED ORAL at 06:24

## 2021-04-11 RX ADMIN — LIDOCAINE 1 PATCH: 50 PATCH CUTANEOUS at 08:39

## 2021-04-11 RX ADMIN — PRAZOSIN HYDROCHLORIDE 1 MG: 1 CAPSULE ORAL at 20:53

## 2021-04-11 RX ADMIN — RISPERIDONE 4 MG: 2 TABLET ORAL at 08:39

## 2021-04-11 RX ADMIN — GABAPENTIN 600 MG: 300 CAPSULE ORAL at 11:31

## 2021-04-11 RX ADMIN — DIPHENHYDRAMINE HCL 25 MG: 25 TABLET ORAL at 20:54

## 2021-04-11 RX ADMIN — GLYCERIN, PETROLATUM, PHENYLEPHRINE HCL, PRAMOXINE HCL 1 APPLICATION: 144; 2.5; 10; 15 CREAM TOPICAL at 08:39

## 2021-04-11 RX ADMIN — GABAPENTIN 600 MG: 300 CAPSULE ORAL at 08:39

## 2021-04-11 RX ADMIN — NICOTINE 1 PATCH: 21 PATCH, EXTENDED RELEASE TRANSDERMAL at 08:40

## 2021-04-11 NOTE — NURSING NOTE
Patient is visible for short times; initially she had behavior in control with a visible edge of anxiety  She talked about her MD making med changes; putting her on Lithium  She was advised to speak to her doctor about this  She requested/received 30 ml MOM despite having BM daily x3 days  She stated "they were small"  Also requested/received 240 ml prune juice  At 2045 she came out of her room screaming  She was in the line for snack and was yelling, paranoid and illogical  She received Atarax 50 mg (Haldol was initially pulled as well but she refused this)  Security was called and did a "walk through"  She was able to calm down and ate her snack then received  Her HS medications before NP, Sammie visited to check her ears as c/o bilateral ear pain earlier   She noted no s/s of infection and patient stated she "felt better"

## 2021-04-11 NOTE — PROGRESS NOTES
04/10/21 1300   Activity/Group Checklist   Group Other (Comment)  (OPEN STUDIO/Art Therapy, Social Interaction-Free Expression)   Attendance Attended   Attendance Duration (min) 46-60   Interactions Interacted appropriately   Affect/Mood Appropriate   Goals Achieved Able to listen to others; Able to engage in interactions

## 2021-04-11 NOTE — PROGRESS NOTES
Progress Note - Behavioral Health   Martin Holbrook 53 y o  female MRN: [id number]  [facility] Encounter: [id number]    This note was not shared with the patient due to reasonable likelihood of causing patient harm     The patient was seen for continuing care and reviewed with treatment team   Staff reports no significant change in the past 24 hours  Patient was observed in room sitting on her bed  Patient was initially irritable upon approach but bright and throughout conversation  She displayed good eye contact with a constricted affect noted  She is denying any depressive symptoms with moderate anxiety noted  She is denying any suicidal/homicidal ideation or auditory/visual hallucinations and did not appear to be responding to internal stimuli at time of encounter  She is reporting adequate sleep, appetite, and energy level  She is compliant with medications and no side effects noted at this time  No agitation noted  We discussed at length how she feels frustrated with friends and family not understanding her diagnosis of schizoaffective or understanding what goes on in her head  She states at times when she feels her medications are no longer working she reaches out for help and her family accuses her of not taking medications  Patient states she does take medications she just needs an adjustment and it frustrates her that no one listens    Patient states it is at these times where she decides to say fuck it and stops taking her medications and then ends up in the hospital     Mental Status Evaluation:  Appearance:  Adequate hygiene and grooming and Good eye contact   Behavior:  cooperative and friendly   Mood:  anxious   Affect: constricted   Speech: Normal rate and Normal volume   Thought Process:  Goal directed and coherent   Thought Content:  Does not verbalize delusional material   Perceptual Disturbances: Denies hallucinations and does not appear to be responding to internal stimuli Risk Potential: No suicidal or homicidal ideation   Attention/Concentration attention span and concentration were age appropriate   Orientation:   Alert and awake   Gait/Station: normal gait/station and normal balance   Motor Activity: No abnormal movement noted     Progress Toward Goals:  No change    Assessment/Plan    Principal Problem:    Schizoaffective disorder, bipolar type (HCC)  Active Problems:    Post-traumatic stress disorder, chronic    Medical clearance for psychiatric admission    Mild intermittent asthma without complication      Recommended Treatment:   1  Continue with pharmacotherapy, group therapy, milieu therapy and occupational therapy  2  Continue with safety checks per unit protocol  3  No medication changes at this time   The patient will be maintained on the following medications:    Current Facility-Administered Medications   Medication Dose Route Frequency Provider Last Rate    acetaminophen  650 mg Oral Q6H PRN Edward Philly Lodics, CRNP      acetaminophen  650 mg Oral Q4H PRN Edward Philly Lodics, CRNP      acetaminophen  975 mg Oral Q6H PRN Edward Philly Lodics, CRNP      albuterol  2 puff Inhalation Q6H PRN Edward Philly Lodics, CRNP      benztropine  1 mg Intramuscular Q4H PRN Max 6/day Isis N Lodics, CRNP      benztropine  1 mg Oral Q4H PRN Max 6/day Isis N Lodics, CRNP      benztropine  1 mg Oral HS Isis N Lodics, CRNP      calcium carbonate  500 mg Oral TID PRN Edward Philly Lodics, CRNP      clonazePAM  0 5 mg Oral BID Padmini Nelson MD      diphenhydrAMINE  25 mg Oral HS Isis N Lodics, CRNP      escitalopram  20 mg Oral Daily Isis N Lodics, CRNP      fenofibrate  145 mg Oral Daily Isis N Lodics, CRNP      gabapentin  600 mg Oral 4x Daily Isis N Lodics, CRNP      haloperidol  5 mg Oral Q6H PRN Isis N Lodics, CRNP      hydrOXYzine HCL  25 mg Oral Q6H PRN Max 4/day Isis N Lodics, CRNP      hydrOXYzine HCL  50 mg Oral Q6H PRN Max 4/day Hermelinda Galeazzi N Lodics, CRNP      lidocaine  1 patch Topical Daily Isis Lama, CRNP      LORazepam  1 mg Intramuscular Q8H PRN Veronica Cartwright MD      LORazepam  1 mg Oral Q8H PRN Isis Lama, CRNP      magnesium hydroxide  30 mL Oral Daily PRN Timmy Bogaert Germanics, CRNP      nicotine  1 patch Transdermal Daily Isis Porterics, CRNP      OLANZapine  10 mg Oral Q3H PRN Isis Porterics, CRNP      OLANZapine  2 5 mg Oral Q8H PRN Isis Porterics, CRNP      OLANZapine  5 mg Oral Q3H PRN Isis Porterics, CRNP      OLANZapine  10 mg Intramuscular Q3H PRN Isis Porterics, CRNP      OLANZapine  5 mg Intramuscular Q3H PRN Isis Lama, CRNP      Pramox-PE-Glycerin-Petrolatum  1 application Rectal 4x Daily PRN Isis Porterics, CRNP      prazosin  1 mg Oral HS Isis Lama, CRNP      risperiDONE  4 mg Oral BID Veronica Cartwright MD         Risks, benefits and possible side effects of Medications:   Risks, benefits, and possible side effects of medications explained to patient and patient verbalizes understanding  Portions of the record may have been created with voice recognition software  Occasional wrong word or "sound a like" substitutions may have occurred due to the inherent limitations of voice recognition software  Read the chart carefully and recognize, using context, where substitutions have occurred

## 2021-04-11 NOTE — NURSING NOTE
Patient was pleasant and cooperative   Patient is agitated after she has mild argument with other female patient  PRN Tylenol given for headache and was effective  Patient was visible on the unit , stated she feels little better  Denies SI,Hi

## 2021-04-11 NOTE — NURSING NOTE
In bed eyes closed appears to be sleeping, body shifting and breath sounds noted will continue to monitor

## 2021-04-11 NOTE — PLAN OF CARE
Problem: Alteration in Thoughts and Perception  Goal: Treatment Goal: Gain control of psychotic behaviors/thinking, reduce/eliminate presenting symptoms and demonstrate improved reality functioning upon discharge  Outcome: Not Progressing  Goal: Verbalize thoughts and feelings  Description: Interventions:  - Promote a nonjudgmental and trusting relationship with the patient through active listening and therapeutic communication  - Assess patient's level of functioning, behavior and potential for risk  - Engage patient in 1 on 1 interactions  - Encourage patient to express fears, feelings, frustrations, and discuss symptoms    - [place] patient to reality, help patient recognize reality-based thinking   - Administer medications as ordered and assess for potential side effects  - Provide the patient education related to the signs and symptoms of the illness and desired effects of prescribed medications  Outcome: Progressing  Goal: Refrain from acting on delusional thinking/internal stimuli  Description: Interventions:  - Monitor patient closely, per order   - Utilize least restrictive measures   - Set reasonable limits, give positive feedback for acceptable   - Administer medications as ordered and monitor of potential side effects  Outcome: Not Progressing  Goal: Agree to be compliant with medication regime, as prescribed and report medication side effects  Description: Interventions:  - Offer appropriate PRN medication and supervise ingestion; conduct AIMS, as needed   Outcome: Progressing  Goal: Attend and participate in unit activities, including therapeutic, recreational, and educational groups  Description: Interventions:  -Encourage Visitation and family involvement in care  Outcome: Not Progressing  Goal: Recognize dysfunctional thoughts, communicate reality-based thoughts at the time of discharge  Description: Interventions:  - Provide medication and psycho-education to assist patient in compliance and developing insight into his/her illness   Outcome: Not Progressing  Goal: Complete daily ADLs, including personal hygiene independently, as able  Description: Interventions:  - Observe, teach, and assist patient with ADLS  - Monitor and promote a balance of rest/activity, with adequate nutrition and elimination   Outcome: Progressing     Problem: Ineffective Coping  Goal: Cooperates with admission process  Description: Interventions:   - Complete admission process  Outcome: Progressing  Goal: Identifies ineffective coping skills  Outcome: Not Progressing  Goal: Identifies healthy coping skills  Outcome: Progressing  Goal: Demonstrates healthy coping skills  Outcome: Not Progressing  Goal: Participates in unit activities  Description: Interventions:  - Provide therapeutic environment   - Provide required programming   - Redirect inappropriate behaviors   Outcome: Progressing  Goal: Patient/Family participate in treatment and DC plans  Description: Interventions:  - Provide therapeutic environment  Outcome: Progressing  Goal: Patient/Family verbalizes awareness of resources  Outcome: Progressing  Goal: Understands least restrictive measures  Description: Interventions:  - Utilize least restrictive behavior  Outcome: Progressing  Goal: Free from restraint events  Description: - Utilize least restrictive measures   - Provide behavioral interventions   - Redirect inappropriate behaviors   Outcome: Progressing     Problem: Risk for Self Injury/Neglect  Goal: Treatment Goal: Remain safe during length of stay, learn and adopt new coping skills, and be free of self-injurious ideation, impulses and acts at the time of discharge  Outcome: Progressing  Goal: Verbalize thoughts and feelings  Description: Interventions:  - Assess and re-assess patient's lethality and potential for self-injury  - Engage patient in 1:1 interactions, daily, for a minimum of 15 minutes  - Encourage patient to express feelings, fears, frustrations, hopes  - Establish rapport/trust with patient   Outcome: Progressing  Goal: Refrain from harming self  Description: Interventions:  - Monitor patient closely, per order  - Develop a trusting relationship  - Supervise medication ingestion, monitor effects and side effects   Outcome: Progressing  Goal: Attend and participate in unit activities, including therapeutic, recreational, and educational groups  Description: Interventions:  - Provide therapeutic and educational activities daily, encourage attendance and participation, and document same in the medical record  - Obtain collateral information, encourage visitation and family involvement in care   Outcome: Progressing  Goal: Recognize maladaptive responses and adopt new coping mechanisms  Outcome: Not Progressing  Goal: Complete daily ADLs, including personal hygiene independently, as able  Description: Interventions:  - Observe, teach, and assist patient with ADLS  - Monitor and promote a balance of rest/activity, with adequate nutrition and elimination  Outcome: Progressing     Problem: Depression  Goal: Treatment Goal: Demonstrate behavioral control of depressive symptoms, verbalize feelings of improved mood/affect, and adopt new coping skills prior to discharge  Outcome: Not Progressing  Goal: Verbalize thoughts and feelings  Description: Interventions:  - Assess and re-assess patient's level of risk   - Engage patient in 1:1 interactions, daily, for a minimum of 15 minutes   - Encourage patient to express feelings, fears, frustrations, hopes   Outcome: Progressing  Goal: Refrain from harming self  Description: Interventions:  - Monitor patient closely, per order   - Supervise medication ingestion, monitor effects and side effects   Outcome: Progressing  Goal: Refrain from isolation  Description: Interventions:  - Develop a trusting relationship   - Encourage socialization   Outcome: Not Progressing  Goal: Refrain from self-neglect  Outcome: Progressing  Goal: Attend and participate in unit activities, including therapeutic, recreational, and educational groups  Description: Interventions:  - Provide therapeutic and educational activities daily, encourage attendance and participation, and document same in the medical record   Outcome: Progressing  Goal: Complete daily ADLs, including personal hygiene independently, as able  Description: Interventions:  - Observe, teach, and assist patient with ADLS  -  Monitor and promote a balance of rest/activity, with adequate nutrition and elimination   Outcome: Progressing     Problem: Anxiety  Goal: Anxiety is at manageable level  Description: Interventions:  - Assess and monitor patient's anxiety level  - Monitor for signs and symptoms (heart palpitations, chest pain, shortness of breath, headaches, nausea, feeling jumpy, restlessness, irritable, apprehensive)  - Collaborate with interdisciplinary team and initiate plan and interventions as ordered    - [place] patient to unit/surroundings  - Explain treatment plan  - Encourage participation in care  - Encourage verbalization of concerns/fears  - Identify coping mechanisms  - Assist in developing anxiety-reducing skills  - Administer/offer alternative therapies  - Limit or eliminate stimulants  Outcome: Not Progressing     Problem: Risk for Violence/Aggression Toward Others  Goal: Treatment Goal: Refrain from acts of violence/aggression during length of stay, and demonstrate improved impulse control at the time of discharge  Outcome: Progressing  Goal: Verbalize thoughts and feelings  Description: Interventions:  - Assess and re-assess patient's level of risk, every waking shift  - Engage patient in 1:1 interactions, daily, for a minimum of 15 minutes   - Allow patient to express feelings and frustrations in a safe and non-threatening manner   - Establish rapport/trust with patient   Outcome: Progressing  Goal: Refrain from harming others  Outcome: Progressing  Goal: Refrain from destructive acts on the environment or property  Outcome: Progressing  Goal: Control angry outbursts  Description: Interventions:  - Monitor patient closely, per order  - Ensure early verbal de-escalation  - Monitor prn medication needs  - Set reasonable/therapeutic limits, outline behavioral expectations, and consequences   - Provide a non-threatening milieu, utilizing the least restrictive interventions   Outcome: Not Progressing  Goal: Attend and participate in unit activities, including therapeutic, recreational, and educational groups  Description: Interventions:  - Provide therapeutic and educational activities daily, encourage attendance and participation, and document same in the medical record   Outcome: Progressing  Goal: Identify appropriate positive anger management techniques  Description: Interventions:  - Offer anger management and coping skills groups   - Staff will provide positive feedback for appropriate anger control  Outcome: Not Progressing     Problem: Alteration in Orientation  Goal: Treatment Goal: Demonstrate a reduction of confusion and improved orientation to person, place, time and/or situation upon discharge, according to optimum baseline/ability  Outcome: Progressing  Goal: Interact with staff daily  Description: Interventions:  - Assess and re-assess patient's level of orientation  - Engage patient in 1 on 1 interactions, daily, for a minimum of 15 minutes   - Establish rapport/trust with patient   Outcome: Progressing  Goal: Express concerns related to confused thinking related to:  Description: Interventions:  - Encourage patient to express feelings, fears, frustrations, hopes  - Assign consistent caregivers   - [place]/re-orient patient as needed  - Allow comfort items, as appropriate  - Provide visual cues, signs, etc    Outcome: Not Progressing  Goal: Allow medical examinations, as recommended  Description: Interventions:  - Provide physical/neurological exams and/or referrals, per provider   Outcome: Progressing  Goal: Cooperate with recommended testing/procedures  Description: Interventions:  - Determine need for ancillary testing  - Observe for mental status changes  - Implement falls/precaution protocol   Outcome: Progressing  Goal: Attend and participate in unit activities, including therapeutic, recreational, and educational groups  Description: Interventions:  - Provide therapeutic and educational activities daily, encourage attendance and participation, and document same in the medical record   - Provide appropriate opportunities for reminiscence   - Provide a consistent daily routine   - Encourage family contact/visitation   Outcome: Progressing  Goal: Complete daily ADLs, including personal hygiene independently, as able  Description: Interventions:  - Observe, teach, and assist patient with ADLS  Outcome: Progressing     Problem: Individualized Interventions  Goal: Patient will verbalize appropriate use of telephone within 5 days  Description: Interventions:  - Treatment team to determine use of supervised phone privileges   Outcome: Progressing  Goal: Patient will verbalize need for hospitalization and will no longer attempt elopement within 5 days  Description: Interventions:  - Ongoing education to help patient understand need for hospitalization  Outcome: Progressing  Goal: Patient will recognize inappropriate behaviors and develop alternative behaviors within 5 days  Description: Interventions:  - Patient in collaboration with Treatment Team will develop a behavior management plan to help identify effective coping skills to deal with stressors  Outcome: Not Progressing

## 2021-04-11 NOTE — NURSING NOTE
Patient up and visible  Stated she has hard time to hear  She used Debrox in the past  Tiger texted [facility] for order

## 2021-04-12 PROBLEM — H93.93 EAR PROBLEM, BILATERAL: Status: ACTIVE | Noted: 2021-04-12

## 2021-04-12 PROCEDURE — 99232 SBSQ HOSP IP/OBS MODERATE 35: CPT | Performed by: PSYCHIATRY & NEUROLOGY

## 2021-04-12 RX ADMIN — RISPERIDONE 4 MG: 2 TABLET ORAL at 21:25

## 2021-04-12 RX ADMIN — CLONAZEPAM 0.5 MG: 0.5 TABLET ORAL at 08:56

## 2021-04-12 RX ADMIN — FENOFIBRATE 145 MG: 145 TABLET ORAL at 08:56

## 2021-04-12 RX ADMIN — BENZTROPINE MESYLATE 1 MG: 1 TABLET ORAL at 10:41

## 2021-04-12 RX ADMIN — RISPERIDONE 4 MG: 2 TABLET ORAL at 08:56

## 2021-04-12 RX ADMIN — GABAPENTIN 600 MG: 300 CAPSULE ORAL at 08:56

## 2021-04-12 RX ADMIN — GABAPENTIN 600 MG: 300 CAPSULE ORAL at 17:32

## 2021-04-12 RX ADMIN — GABAPENTIN 600 MG: 300 CAPSULE ORAL at 11:39

## 2021-04-12 RX ADMIN — LIDOCAINE 1 PATCH: 50 PATCH CUTANEOUS at 08:57

## 2021-04-12 RX ADMIN — CLONAZEPAM 0.5 MG: 0.5 TABLET ORAL at 21:23

## 2021-04-12 RX ADMIN — NICOTINE 1 PATCH: 21 PATCH, EXTENDED RELEASE TRANSDERMAL at 08:57

## 2021-04-12 RX ADMIN — ESCITALOPRAM OXALATE 20 MG: 10 TABLET ORAL at 08:56

## 2021-04-12 RX ADMIN — HYDROXYZINE HYDROCHLORIDE 50 MG: 50 TABLET, FILM COATED ORAL at 13:56

## 2021-04-12 RX ADMIN — GABAPENTIN 600 MG: 300 CAPSULE ORAL at 21:23

## 2021-04-12 RX ADMIN — OLANZAPINE 10 MG: 10 TABLET, ORALLY DISINTEGRATING ORAL at 10:42

## 2021-04-12 RX ADMIN — DIPHENHYDRAMINE HCL 25 MG: 25 TABLET ORAL at 21:23

## 2021-04-12 RX ADMIN — BENZTROPINE MESYLATE 1 MG: 1 TABLET ORAL at 21:25

## 2021-04-12 NOTE — PROGRESS NOTES
[facility]  Progress Note Georgia Chaney 1970, 53 y o  female MRN: [id number]  Unit/Bed#: [facility] 110-02 Encounter: [id number]  Primary Care Provider: Cain Hernandez DO   Date and time admitted to hospital: 4/7/2021  2:35 PM    Ear problem, bilateral  Assessment & Plan  · Patient reports intermittent decreased hearing, reporting she had some difficulty hearing TV this AM (sounded muffled)  · During encounter, patient does not exhibit signs of difficulty hearing   · Also notes occasional wax build-up: sometimes wakes up and wax "falls out"  · No signs of acute infection of cerumen impaction  · Encourage adequate ear hygiene     Nurse Coordination of Care Discussion: spoke with treatment team RN    Discussions with Specialists or Other Care Team Provider: none    Education and Discussions with Family / Patient: discussed current plan with patient, answered all questions to best of my ability    Time Spent for Care: 20 minutes  More than 50% of total time spent on counseling and coordination of care as described above  Current Length of Stay: 5 day(s)    Code Status: Level 1 - Full Code      Subjective:   Received sign-out that nursing staff had contacted medicine service today for evaluation of patient c/o difficulty hearing and possible need for debrox drops  Patient seen and evaluated this evening  Please note- Patient exhibits tangential speech during encounter  Reports that when she awoke this morning, she noticed some difficulty hearing the TV in that her hearing seemed muffled, unable to ascertain if right or left ear worse  Notes that hearing intermittently becomes muffled throughout the day  Also reports that sometimes when she wakes up in the morning she feels as if she has a lot of wax buildup in her ears and it falls out   Patient states that she routinely cleans her ears in the shower, rinsing with water and also washing with soap    Patient recalls use of Debrox drops in the past, however notes that she feels as if the drops pushed on her ear drums   Patient specifically requests that she not be given antibiotics because she "does not want to get high"  Reports remote history of staph infection of right external ear  Nursing staff notes that patient has not exhibited signs of decreased ability to hear, reporting that she was on the phone throughout the day and seemed to hear adequately  Objective:     Vitals:   Temp (24hrs), Av 5 °F (36 4 °C), Min:97 4 °F (36 3 °C), Max:97 5 °F (36 4 °C)    Temp:  [97 4 °F (36 3 °C)-97 5 °F (36 4 °C)] 97 4 °F (36 3 °C)  HR:  [64-94] 94  Resp:  [18-19] 19  BP: (108-135)/(60-80) 117/73  Body mass index is 21 58 kg/m²  Physical Exam:     Physical Exam  Vitals signs reviewed  Constitutional:       General: She is not in acute distress  Appearance: Normal appearance  She is normal weight  She is not ill-appearing or diaphoretic  HENT:      Head: Normocephalic and atraumatic  Right Ear: Hearing, tympanic membrane, ear canal and external ear normal  No tenderness  There is no impacted cerumen  No mastoid tenderness  Tympanic membrane is not erythematous or bulging  Left Ear: Hearing, tympanic membrane, ear canal and external ear normal  No tenderness  There is no impacted cerumen  No mastoid tenderness  Tympanic membrane is not erythematous or bulging  Ears:      Comments: Very minimal wax present in ears b/l     Nose: Nose normal  No rhinorrhea  Mouth/Throat:      Mouth: Mucous membranes are moist       Pharynx: Oropharynx is clear  Eyes:      General: No scleral icterus  Extraocular Movements: Extraocular movements intact  Conjunctiva/sclera: Conjunctivae normal    Neck:      Musculoskeletal: Normal range of motion  Cardiovascular:      Rate and Rhythm: Normal rate and regular rhythm  Pulses: Normal pulses  Heart sounds: Normal heart sounds  No murmur  No friction rub  No gallop  Pulmonary:      Effort: Pulmonary effort is normal  No respiratory distress  Breath sounds: Normal breath sounds  No wheezing, rhonchi or rales  Musculoskeletal: Normal range of motion  Lymphadenopathy:      Cervical: No cervical adenopathy  Skin:     General: Skin is warm and dry  Neurological:      General: No focal deficit present  Mental Status: She is alert  Mental status is at baseline  Sensory: No sensory deficit  Psychiatric:         Attention and Perception: Attention normal          Mood and Affect: Mood is anxious  Speech: Speech is tangential          Behavior: Behavior is cooperative  Judgment: Judgment is impulsive and inappropriate  Additional Data:     Labs:    Results from last 7 days   Lab Units 04/09/21  0545   WBC Thousand/uL 6 80   HEMOGLOBIN g/dL 13 0   HEMATOCRIT % 39 5   PLATELETS Thousands/uL 282     Results from last 7 days   Lab Units 04/09/21  0545   SODIUM mmol/L 139   POTASSIUM mmol/L 5 0   CHLORIDE mmol/L 103   CO2 mmol/L 29   BUN mg/dL 15   CREATININE mg/dL 0 58*   ANION GAP mmol/L 7   CALCIUM mg/dL 9 6   ALBUMIN g/dL 4 0   TOTAL BILIRUBIN mg/dL 0 20   ALK PHOS U/L 60   ALT U/L 17   AST U/L 21   GLUCOSE RANDOM mg/dL 91                     * I Have Reviewed All Lab Data Listed Above    * Additional Pertinent Lab Tests Reviewed: Specific Labs Reviewed Include CBC, CMP, TSH    Imaging:    Imaging Reports Reviewed Today Include: none      Last 24 Hours Medication List:   Current Facility-Administered Medications   Medication Dose Route Frequency Provider Last Rate    acetaminophen  650 mg Oral Q6H PRN Isis SHONA Porterics, CRNP      acetaminophen  650 mg Oral Q4H PRN Gwenevere Slipper Lodics, CRNP      acetaminophen  975 mg Oral Q6H PRN Gwenevere Slipper Lodics, CRNP      albuterol  2 puff Inhalation Q6H PRN Gwenevere Slipper Lodics, CRNP      benztropine  1 mg Intramuscular Q4H PRN Max 6/day Isis N Lodics, CRNP      benztropine  1 mg Oral Q4H PRN Max 6/day Gwenevere Slipper Lodics, CRNP      benztropine  1 mg Oral HS Isis Porterics, CRNP      calcium carbonate  500 mg Oral TID PRN Gwenevere Slipper Lodics, CRNP      clonazePAM  0 5 mg Oral BID Edi Marques MD      diphenhydrAMINE  25 mg Oral HS Isis Porterics, CRNP      escitalopram  20 mg Oral Daily Isis Porterics, CRNP      fenofibrate  145 mg Oral Daily Isis Porterics, CRNP      gabapentin  600 mg Oral 4x Daily Isis Porterics, CRNP      haloperidol  5 mg Oral Q6H PRN Gwenevere Slipper Lodics, CRNP      hydrOXYzine HCL  25 mg Oral Q6H PRN Max 4/day Isis Porterics, CRNP      hydrOXYzine HCL  50 mg Oral Q6H PRN Max 4/day Isis Porterics, CRNP      lidocaine  1 patch Topical Daily Isis Porterics, CRNP      LORazepam  1 mg Intramuscular Q8H PRN Edi Marques MD      LORazepam  1 mg Oral Q8H PRN Isis Porterics, CRNP      magnesium hydroxide  30 mL Oral Daily PRN [facility] S[facility], CRNP      nicotine  1 patch Transdermal Daily Isis Porterics, CRNP      OLANZapine  10 mg Oral Q3H PRN Isis Porterics, CRNP      OLANZapine  2 5 mg Oral Q8H PRN Isis Porterics, CRNP      OLANZapine  5 mg Oral Q3H PRN Isis Porterics, CRNP      OLANZapine  10 mg Intramuscular Q3H PRN Isis Porterics, CRNP      OLANZapine  5 mg Intramuscular Q3H PRN Isis Porterics, CRNP      Pramox-PE-Glycerin-Petrolatum  1 application Rectal 4x Daily PRN Isis Porterics, CRNP      prazosin  1 mg Oral HS Isis Lama, CRNP      risperiDONE  4 mg Oral BID Edi Marques MD          Today, Patient Was Seen By: Harrell Lennox, PA-C      ** Please Note: Dictation voice to text software may have been used in the creation of this document   **

## 2021-04-12 NOTE — PROGRESS NOTES
04/12/21 1100   Activity/Group Checklist   Group   (IMR)   Attendance Attended   Attendance Duration (min) Greater than 60   Interactions Interacted appropriately   Affect/Mood Appropriate;Calm;Normal range   Goals Achieved Identified feelings; Able to engage in interactions; Able to listen to others

## 2021-04-12 NOTE — PLAN OF CARE
Problem: Alteration in Thoughts and Perception  Goal: Attend and participate in unit activities, including therapeutic, recreational, and educational groups  Description: Interventions:  -Encourage Visitation and family involvement in care  Outcome: Progressing   Patient completed Goal card for the week of 4/12/21    Goals: Stay to myself in my own world             Attend all groups              Try to walk for exercise

## 2021-04-12 NOTE — PROGRESS NOTES
04/12/21 1132   Team Meeting   Meeting Type Daily Rounds   Initial Conference Date 04/12/21   Patient/Family Present   Patient Present No   Patient's Family Present No         Daily Rounds Documentation  Team Members Participating  MD Brad Scruggs, YUDY Dumont, LSW  Mandi Miner, LSW  Juan Kahn, ANNALISE Carney RN    Case reviewed  6/6 groups  C/O ear/hearing issues, seen by medical  Minor conflict with peer over weekend  Irritable edge, labile, easily reactive; at one point security called with her yelling and outburst to do walk through

## 2021-04-12 NOTE — PROGRESS NOTES
04/12/21 0700   Activity/Group Checklist   Group   (Coffee Talk )   Attendance Attended   Attendance Duration (min) 46-60   Interactions Interacted appropriately   Affect/Mood Appropriate;Calm;Normal range   Goals Achieved Able to listen to others; Able to engage in interactions

## 2021-04-12 NOTE — PROGRESS NOTES
Psychiatry Progress Note [facility] 53 y o  female MRN: [id number]  Unit/Bed#: AMBER LOU [facility] [place] 899-04 Encounter: [id number]  Code Status: Level 1 - Full Code    PCP: Frank Bello DO    Date of Admission:  4/7/2021 1435   Date of Service:  04/12/21    Patient Active Problem List   Diagnosis    Schizoaffective disorder, bipolar type ([facility] )    Uncomplicated alcohol dependence ([facility] )    Suicidal behavior    LYNN (generalized anxiety disorder)    Slow transit constipation    Deficiency of vitamin B    Degenerative disc disease, lumbar    Post-traumatic stress disorder, chronic    Lower extremity weakness    Generalized abdominal pain    Non-intractable vomiting    Medical clearance for psychiatric admission    Carbuncle    Alcohol dependence with unspecified alcohol-induced disorder ([facility] )    Mild intermittent asthma without complication    Tobacco abuse    Ear problem, bilateral       Review of systems:    Unremarkable except for year ache and decreased hearing for which she was checked out by medical  Diagnosis:  Schizoaffective bipolar, alcohol use episode    Assessment   Overall Status:   Displaying mood swings irritability he needed p r n  meds and somewhat agitated labile silverman and irritated   Certification Statement: The patient will continue to require additional inpatient hospital stay due to ongoing mood swings paranoia   Acceptance by patient:  accepting    Hopefulness in recovery:  Living in a group home   Understanding of medications: has good understanding   Involved in reintegration process:  Adjusting to the unit   Trusting in relationship with psychiatrist:  trusting   Justification for dual anti-psychotics:  Not applicable   Side effects from treatment: none    Medication changes    Refuses any mood stabilizers claiming she wont take lithium, depakote or tegretol and will decrease klonopin as 0 5 mg one po bid due to hx of alcohol use and incidence of not being abl eto be aroused easily yesterday evening  Finally she agreed to give it a try with lithium when reminded that she needs a mood stabilizer    Non-pharmacological treatments   Continue with individual, group, milieu and occupational therapy using recovery principles and psycho-education about accepting illness and the need for treatment  Safety   Safety and communication plan established to target dynamic risk factors discussed above  Discharge Plan     most likely to a group home with the act team    Interval Progress      somewhat agitated silverman demanding p r n  meds and did get p r n  meds over the weekend  Not easily redirectable slamming doors displaying mood swings and loud outbursts  Still observed to talk to herself and becomes fearful paranoid demanding agitated  No further episodes of having difficulty to arouse her in the evening since Klonopin was lowered  Not too happy about Klonopin being weaned off  Becomes paranoid silverman agitated demanding   She was agitated and found sitting in her room yelling and screaming at times and does admit to feeling hypnotized by the staff at the nurse's station   Sleep: fair   appetite:   good   compliance with medications: good   Side  Effects:   none   significant events:   Still silverman paranoid suspicious agitated   group attendance:  Attend some of the groups    Mental Status Exam  Appearance: age appropriate, casually dressed, adequate grooming, looks older than stated age, underweight  Behavior: normal, pleasant, cooperative, appears anxious  Speech: fluent, coherent, increased rate, pressured, hypertalkative,   Circumstantial pressured  Mood: depressed, anxious, irritable, euphoric  Affect: tearful, inappropriate, labile, reactive  Thought Process: organized, goal directed  Circumstantial tangential disorganized scattered and pressured  Thought Content: no overt delusions, but does appear as if paranoid    No current suicidal homicidal thoughts intent or plans verbalized  No phobias obsessions compulsions or distorted body perceptions elicited  now complains about people doing hypnosis from the nurse's station  And tells me she is not feeling right  Perceptual Disturbances: no auditory hallucinations, no visual hallucinations  Hx Risk Factors: chronic mood disorder, chronic psychotic symptoms, alcohol use  Sensorium:    Oriented to 3 spheres and to situation  Cognition: recent and remote memory grossly intact  Consciousness: alert and awake  Attention: attention span and concentration are age appropriate  Intellect: appears to be of average intelligence  Insight: poor  Judgement: impaired  Motor Activity: no abnormal movements     Vitals  Temp:  [97 4 °F (36 3 °C)-97 5 °F (36 4 °C)] 97 4 °F (36 3 °C)  HR:  [64-94] 94  Resp:  [18-19] 19  BP: (108-135)/(60-80) 117/73  No intake or output data in the 24 hours ending 04/12/21 0645    Lab Results:  [facility] Admission Reviewed EKG shows increasing       Current Facility-Administered Medications   Medication Dose Route Frequency Provider Last Rate    acetaminophen  650 mg Oral Q6H PRN Venancio Aloe Lodics, CRNP      acetaminophen  650 mg Oral Q4H PRN Venancio Aloe Lodics, CRNP      acetaminophen  975 mg Oral Q6H PRN Venancio Aloe Lodics, CRNP      albuterol  2 puff Inhalation Q6H PRN Venancio Aloe Lodics, CRNP      benztropine  1 mg Intramuscular Q4H PRN Max 6/day Isis N Lodics, CRNP      benztropine  1 mg Oral Q4H PRN Max 6/day Isis N Lodics, CRNP      benztropine  1 mg Oral HS Isis N Lodics, CRNP      calcium carbonate  500 mg Oral TID PRN Venancio Aloe Lodics, CRNP      clonazePAM  0 5 mg Oral BID Shefali Stanley MD      diphenhydrAMINE  25 mg Oral HS Isis N Lodics, CRNP      escitalopram  20 mg Oral Daily Isis N Lodics, CRNP      fenofibrate  145 mg Oral Daily Isis N Lodics, CRNP      gabapentin  600 mg Oral 4x Daily JARED Waters haloperidol  5 mg Oral Q6H PRN Isis NAGEL Lodics, CRNP      hydrOXYzine HCL  25 mg Oral Q6H PRN Max 4/day Isis Porterics, CRNP      hydrOXYzine HCL  50 mg Oral Q6H PRN Max 4/day Isis Porterics, CRNP      lidocaine  1 patch Topical Daily Isis Porterics, CRNP      LORazepam  1 mg Intramuscular Q8H PRN Edi Marques MD      LORazepam  1 mg Oral Q8H PRN Isis NAGEL Lodics, CRNP      magnesium hydroxide  30 mL Oral Daily PRN Gwenevere Slipper Lodics, CRNP      nicotine  1 patch Transdermal Daily Isis Porterics, CRNP      OLANZapine  10 mg Oral Q3H PRN Isis NAGEL Lodics, CRNP      OLANZapine  2 5 mg Oral Q8H PRN Isis NAGEL Lodics, CRNP      OLANZapine  5 mg Oral Q3H PRN Isis NAGEL Lodics, CRNP      OLANZapine  10 mg Intramuscular Q3H PRN Isis NAGEL Lodics, CRNP      OLANZapine  5 mg Intramuscular Q3H PRN Gwenevere Slipper Lodics, CRNP      Pramox-PE-Glycerin-Petrolatum  1 application Rectal 4x Daily PRN Isis Porterics, CRNP      prazosin  1 mg Oral HS Isis Lama, CRNP      risperiDONE  4 mg Oral BID Edi Marques MD         Counseling / Coordination of Care: Total floor / unit time spent today 15 minutes  Greater than 50% of total time was spent with the patient and / or family counseling and / or somewhat receptive to supportive listening and teaching positive coping skills to deal with symptom mangement  Patient's Rights, confidentiality and exceptions to confidentiality, use of automated medical record, [facility] staff access to medical record, and consent to treatment reviewed  This note has been dictated

## 2021-04-12 NOTE — NURSING NOTE
Patient is medication compliant but her behavior is out of control She yells and screams all day long also very medication seeking  She does attend groups

## 2021-04-12 NOTE — PROGRESS NOTES
04/12/21 0900   Activity/Group Checklist   Group Community meeting   Attendance Attended   Attendance Duration (min) 16-30   Interactions Disorganized interaction   Affect/Mood Wide   Goals Achieved Able to engage in interactions; Able to listen to others

## 2021-04-12 NOTE — PLAN OF CARE
Problem: Alteration in Thoughts and Perception  Goal: Treatment Goal: Gain control of psychotic behaviors/thinking, reduce/eliminate presenting symptoms and demonstrate improved reality functioning upon discharge  Outcome: Not Progressing  Goal: Verbalize thoughts and feelings  Description: Interventions:  - Promote a nonjudgmental and trusting relationship with the patient through active listening and therapeutic communication  - Assess patient's level of functioning, behavior and potential for risk  - Engage patient in 1 on 1 interactions  - Encourage patient to express fears, feelings, frustrations, and discuss symptoms    - [place] patient to reality, help patient recognize reality-based thinking   - Administer medications as ordered and assess for potential side effects  - Provide the patient education related to the signs and symptoms of the illness and desired effects of prescribed medications  Outcome: Progressing  Goal: Refrain from acting on delusional thinking/internal stimuli  Description: Interventions:  - Monitor patient closely, per order   - Utilize least restrictive measures   - Set reasonable limits, give positive feedback for acceptable   - Administer medications as ordered and monitor of potential side effects  Outcome: Not Progressing  Goal: Agree to be compliant with medication regime, as prescribed and report medication side effects  Description: Interventions:  - Offer appropriate PRN medication and supervise ingestion; conduct AIMS, as needed   Outcome: Progressing  Goal: Attend and participate in unit activities, including therapeutic, recreational, and educational groups  Description: Interventions:  -Encourage Visitation and family involvement in care  Outcome: Progressing  Goal: Recognize dysfunctional thoughts, communicate reality-based thoughts at the time of discharge  Description: Interventions:  - Provide medication and psycho-education to assist patient in compliance and developing insight into his/her illness   Outcome: Not Progressing  Goal: Complete daily ADLs, including personal hygiene independently, as able  Description: Interventions:  - Observe, teach, and assist patient with ADLS  - Monitor and promote a balance of rest/activity, with adequate nutrition and elimination   Outcome: Progressing     Problem: Ineffective Coping  Goal: Cooperates with admission process  Description: Interventions:   - Complete admission process  Outcome: Progressing  Goal: Identifies ineffective coping skills  Outcome: Not Progressing  Goal: Identifies healthy coping skills  Outcome: Not Progressing  Goal: Demonstrates healthy coping skills  Outcome: Not Progressing  Goal: Participates in unit activities  Description: Interventions:  - Provide therapeutic environment   - Provide required programming   - Redirect inappropriate behaviors   Outcome: Progressing  Goal: Patient/Family participate in treatment and DC plans  Description: Interventions:  - Provide therapeutic environment  Outcome: Progressing  Goal: Patient/Family verbalizes awareness of resources  Outcome: Not Progressing  Goal: Understands least restrictive measures  Description: Interventions:  - Utilize least restrictive behavior  Outcome: Progressing  Goal: Free from restraint events  Description: - Utilize least restrictive measures   - Provide behavioral interventions   - Redirect inappropriate behaviors   Outcome: Progressing     Problem: Risk for Self Injury/Neglect  Goal: Treatment Goal: Remain safe during length of stay, learn and adopt new coping skills, and be free of self-injurious ideation, impulses and acts at the time of discharge  Outcome: Progressing  Goal: Verbalize thoughts and feelings  Description: Interventions:  - Assess and re-assess patient's lethality and potential for self-injury  - Engage patient in 1:1 interactions, daily, for a minimum of 15 minutes  - Encourage patient to express feelings, fears, frustrations, hopes  - Establish rapport/trust with patient   Outcome: Progressing  Goal: Refrain from harming self  Description: Interventions:  - Monitor patient closely, per order  - Develop a trusting relationship  - Supervise medication ingestion, monitor effects and side effects   Outcome: Progressing  Goal: Attend and participate in unit activities, including therapeutic, recreational, and educational groups  Description: Interventions:  - Provide therapeutic and educational activities daily, encourage attendance and participation, and document same in the medical record  - Obtain collateral information, encourage visitation and family involvement in care   Outcome: Progressing  Goal: Recognize maladaptive responses and adopt new coping mechanisms  Outcome: Not Progressing  Goal: Complete daily ADLs, including personal hygiene independently, as able  Description: Interventions:  - Observe, teach, and assist patient with ADLS  - Monitor and promote a balance of rest/activity, with adequate nutrition and elimination  Outcome: Progressing     Problem: Depression  Goal: Treatment Goal: Demonstrate behavioral control of depressive symptoms, verbalize feelings of improved mood/affect, and adopt new coping skills prior to discharge  Outcome: Not Progressing  Goal: Verbalize thoughts and feelings  Description: Interventions:  - Assess and re-assess patient's level of risk   - Engage patient in 1:1 interactions, daily, for a minimum of 15 minutes   - Encourage patient to express feelings, fears, frustrations, hopes   Outcome: Progressing  Goal: Refrain from harming self  Description: Interventions:  - Monitor patient closely, per order   - Supervise medication ingestion, monitor effects and side effects   Outcome: Progressing  Goal: Refrain from isolation  Description: Interventions:  - Develop a trusting relationship   - Encourage socialization   Outcome: Progressing  Goal: Refrain from self-neglect  Outcome: Progressing  Goal: Attend and participate in unit activities, including therapeutic, recreational, and educational groups  Description: Interventions:  - Provide therapeutic and educational activities daily, encourage attendance and participation, and document same in the medical record   Outcome: Progressing  Goal: Complete daily ADLs, including personal hygiene independently, as able  Description: Interventions:  - Observe, teach, and assist patient with ADLS  -  Monitor and promote a balance of rest/activity, with adequate nutrition and elimination   Outcome: Progressing     Problem: Anxiety  Goal: Anxiety is at manageable level  Description: Interventions:  - Assess and monitor patient's anxiety level  - Monitor for signs and symptoms (heart palpitations, chest pain, shortness of breath, headaches, nausea, feeling jumpy, restlessness, irritable, apprehensive)  - Collaborate with interdisciplinary team and initiate plan and interventions as ordered    - [place] patient to unit/surroundings  - Explain treatment plan  - Encourage participation in care  - Encourage verbalization of concerns/fears  - Identify coping mechanisms  - Assist in developing anxiety-reducing skills  - Administer/offer alternative therapies  - Limit or eliminate stimulants  Outcome: Not Progressing     Problem: Risk for Violence/Aggression Toward Others  Goal: Treatment Goal: Refrain from acts of violence/aggression during length of stay, and demonstrate improved impulse control at the time of discharge  Outcome: Not Progressing  Goal: Verbalize thoughts and feelings  Description: Interventions:  - Assess and re-assess patient's level of risk, every waking shift  - Engage patient in 1:1 interactions, daily, for a minimum of 15 minutes   - Allow patient to express feelings and frustrations in a safe and non-threatening manner   - Establish rapport/trust with patient   Outcome: Progressing  Goal: Refrain from harming others  Outcome: Progressing  Goal: Refrain from destructive acts on the environment or property  Outcome: Progressing  Goal: Control angry outbursts  Description: Interventions:  - Monitor patient closely, per order  - Ensure early verbal de-escalation  - Monitor prn medication needs  - Set reasonable/therapeutic limits, outline behavioral expectations, and consequences   - Provide a non-threatening milieu, utilizing the least restrictive interventions   Outcome: Not Progressing  Goal: Attend and participate in unit activities, including therapeutic, recreational, and educational groups  Description: Interventions:  - Provide therapeutic and educational activities daily, encourage attendance and participation, and document same in the medical record   Outcome: Progressing  Goal: Identify appropriate positive anger management techniques  Description: Interventions:  - Offer anger management and coping skills groups   - Staff will provide positive feedback for appropriate anger control  Outcome: Not Progressing     Problem: Alteration in Orientation  Goal: Treatment Goal: Demonstrate a reduction of confusion and improved orientation to person, place, time and/or situation upon discharge, according to optimum baseline/ability  Outcome: Not Progressing  Goal: Interact with staff daily  Description: Interventions:  - Assess and re-assess patient's level of orientation  - Engage patient in 1 on 1 interactions, daily, for a minimum of 15 minutes   - Establish rapport/trust with patient   Outcome: Progressing  Goal: Express concerns related to confused thinking related to:  Description: Interventions:  - Encourage patient to express feelings, fears, frustrations, hopes  - Assign consistent caregivers   - [place]/re-orient patient as needed  - Allow comfort items, as appropriate  - Provide visual cues, signs, etc    Outcome: Not Progressing  Goal: Allow medical examinations, as recommended  Description: Interventions:  - Provide physical/neurological exams and/or referrals, per provider   Outcome: Progressing  Goal: Cooperate with recommended testing/procedures  Description: Interventions:  - Determine need for ancillary testing  - Observe for mental status changes  - Implement falls/precaution protocol   Outcome: Progressing  Goal: Attend and participate in unit activities, including therapeutic, recreational, and educational groups  Description: Interventions:  - Provide therapeutic and educational activities daily, encourage attendance and participation, and document same in the medical record   - Provide appropriate opportunities for reminiscence   - Provide a consistent daily routine   - Encourage family contact/visitation   Outcome: Progressing  Goal: Complete daily ADLs, including personal hygiene independently, as able  Description: Interventions:  - Observe, teach, and assist patient with ADLS  Outcome: Progressing     Problem: Individualized Interventions  Goal: Patient will verbalize appropriate use of telephone within 5 days  Description: Interventions:  - Treatment team to determine use of supervised phone privileges   Outcome: Progressing  Goal: Patient will verbalize need for hospitalization and will no longer attempt elopement within 5 days  Description: Interventions:  - Ongoing education to help patient understand need for hospitalization  Outcome: Progressing  Goal: Patient will recognize inappropriate behaviors and develop alternative behaviors within 5 days  Description: Interventions:  - Patient in collaboration with Treatment Team will develop a behavior management plan to help identify effective coping skills to deal with stressors  Outcome: Not Progressing

## 2021-04-12 NOTE — ASSESSMENT & PLAN NOTE
· Patient reports intermittent decreased hearing, reporting she had some difficulty hearing TV this AM (sounded muffled)  · During encounter, patient does not exhibit signs of difficulty hearing   · Also notes occasional wax build-up: sometimes wakes up and wax "falls out"  · No signs of acute infection of cerumen impaction  · Encourage adequate ear hygiene

## 2021-04-13 PROCEDURE — 99232 SBSQ HOSP IP/OBS MODERATE 35: CPT | Performed by: PSYCHIATRY & NEUROLOGY

## 2021-04-13 RX ORDER — OLANZAPINE 5 MG/1
5 TABLET ORAL 2 TIMES DAILY
Status: DISCONTINUED | OUTPATIENT
Start: 2021-04-13 | End: 2021-04-15

## 2021-04-13 RX ORDER — CLONAZEPAM 0.5 MG/1
0.5 TABLET ORAL
Status: DISCONTINUED | OUTPATIENT
Start: 2021-04-13 | End: 2021-04-15

## 2021-04-13 RX ADMIN — GABAPENTIN 600 MG: 300 CAPSULE ORAL at 12:23

## 2021-04-13 RX ADMIN — DIPHENHYDRAMINE HCL 25 MG: 25 TABLET ORAL at 21:08

## 2021-04-13 RX ADMIN — OLANZAPINE 5 MG: 5 TABLET, FILM COATED ORAL at 17:02

## 2021-04-13 RX ADMIN — HYDROXYZINE HYDROCHLORIDE 25 MG: 25 TABLET, FILM COATED ORAL at 01:38

## 2021-04-13 RX ADMIN — NICOTINE 1 PATCH: 21 PATCH, EXTENDED RELEASE TRANSDERMAL at 08:24

## 2021-04-13 RX ADMIN — BENZTROPINE MESYLATE 1 MG: 1 TABLET ORAL at 21:08

## 2021-04-13 RX ADMIN — CLONAZEPAM 0.5 MG: 0.5 TABLET ORAL at 08:23

## 2021-04-13 RX ADMIN — ESCITALOPRAM OXALATE 20 MG: 10 TABLET ORAL at 08:24

## 2021-04-13 RX ADMIN — LORAZEPAM 1 MG: 1 TABLET ORAL at 05:25

## 2021-04-13 RX ADMIN — GABAPENTIN 600 MG: 300 CAPSULE ORAL at 21:08

## 2021-04-13 RX ADMIN — LIDOCAINE 1 PATCH: 50 PATCH CUTANEOUS at 08:24

## 2021-04-13 RX ADMIN — GABAPENTIN 600 MG: 300 CAPSULE ORAL at 08:23

## 2021-04-13 RX ADMIN — OLANZAPINE 5 MG: 5 TABLET, FILM COATED ORAL at 12:24

## 2021-04-13 RX ADMIN — FENOFIBRATE 145 MG: 145 TABLET ORAL at 08:24

## 2021-04-13 RX ADMIN — CLONAZEPAM 0.5 MG: 0.5 TABLET ORAL at 21:08

## 2021-04-13 RX ADMIN — RISPERIDONE 4 MG: 2 TABLET ORAL at 08:24

## 2021-04-13 RX ADMIN — GABAPENTIN 600 MG: 300 CAPSULE ORAL at 17:02

## 2021-04-13 NOTE — PROGRESS NOTES
04/13/21 1100   Activity/Group Checklist   Group   (IM[facility] )   Attendance Attended   Attendance Duration (min) 46-60   Interactions Interacted appropriately   Affect/Mood Appropriate;Bright;Calm;Normal range   Goals Achieved Identified feelings; Identified relapse prevention strategies; Discussed self-esteem issues; Identified distorted thoughts/beliefs; Able to listen to others; Able to engage in interactions; Able to reflect/comment on own behavior;Able to manage/cope with feelings; Able to self-disclose

## 2021-04-13 NOTE — NURSING NOTE
Patient remains un co operative most of the time She gets loud and demanding      She is also very  Medication seeking most of the time She is compliant with meals and does attend groups

## 2021-04-13 NOTE — NURSING NOTE
Anthony Harley has been visible  Patient is medication and meal compliant  No behaviors noted this shift but patient is irritable  Consumed 100% of dinner  Attended therapeutic and wrap up group  Continue to monitor

## 2021-04-13 NOTE — PLAN OF CARE
Arrived half way through group  Participated in [place]  Appeared to be following breathing/movements with Therapist model  Reported that the practice was "helpful", and that she felt calm       Topics covered:  Mind/Body Connection  Relaxation as part of daily Routine  Relaxation as coping strategy for emotional distress

## 2021-04-13 NOTE — PROGRESS NOTES
Psychiatry Progress Note [facility] 53 y o  female MRN: [id number]  Unit/Bed#: [facility] 073-31 Encounter: [id number]  Code Status: Level 1 - Full Code    PCP: Naty Calvin DO    Date of Admission:  4/7/2021 1435   Date of Service:  04/13/21    Patient Active Problem List   Diagnosis    Schizoaffective disorder, bipolar type ([facility] )    Uncomplicated alcohol dependence ([facility] )    Suicidal behavior    LYNN (generalized anxiety disorder)    Slow transit constipation    Deficiency of vitamin B    Degenerative disc disease, lumbar    Post-traumatic stress disorder, chronic    Lower extremity weakness    Generalized abdominal pain    Non-intractable vomiting    Medical clearance for psychiatric admission    Carbuncle    Alcohol dependence with unspecified alcohol-induced disorder ([facility] )    Mild intermittent asthma without complication    Tobacco abuse    Ear problem, bilateral       Review of systems:     unremarkable  Diagnosis:   Schizoaffective bipolar, alcohol use episodic    Assessment   Overall Status:    Still silverman agitated with marked could mood swings with yelling crying appearing agitated suspicious paranoid needing p r n  meds off and on   Certification Statement: The patient will continue to require additional inpatient hospital stay due to ongoing mood swings paranoia   Acceptance by patient:  accepting    Hopefulness in recovery:  Living in a group home   Understanding of medications: has good understanding   Involved in reintegration process:  Adjusting to the unit   Trusting in relationship with psychiatrist:  trusting   Justification for dual anti-psychotics:  Not applicable   Side effects from treatment: none    Medication changes    Refuses any mood stabilizers claiming she wont take lithium, depakote or tegretol and will decrease klonopin as 0 5 mg one po bid due to hx of alcohol use and incidence of not being abl eto be aroused easily yesterday evening  Finally she agreed to give it a try with lithium when reminded that she needs a mood stabilizer but will hold off for now,    Start zyprexa 5 mg po bid instead of Risperdon which is not working for her mood swings     Decrease Klonopin and wean off eventually and stop ativan as she is med seeking and demanding ativan last night     Non-pharmacological treatments   Continue with individual, group, milieu and occupational therapy using recovery principles and psycho-education about accepting illness and the need for treatment  Safety   Safety and communication plan established to target dynamic risk factors discussed above  Discharge Plan     most likely to a group home with the act team    Interval Progress    gets agitated in silverman demanding p r n  meds  Has been yelling and is playing marked mood swings accusing staff of doing hypnosis on her  Observed to talk to herself and then become tearful paranoid demanding and agitated off and on  Silverman agitated demanding yells and screams but compliant with medications and attending groups  Med seeking, needed zyprexa and atarax and was screaming and demanding         Sleep: somewhat limited as she woke up early in the morning   appetite:    good   compliance with medications: good   Side  Effects:    none   significant events:    Remains suspicious agitated yells and screams paranoid silverman and labile   group attendance:   Attends some of the groups    Mental Status Exam  Appearance: age appropriate, casually dressed, adequate grooming, looks older than stated age, underweight wearing  face mask   Behavior: normal, pleasant, cooperative, appears anxious  Speech: fluent, coherent, pressured, hypertalkative,   Circumstantial pressured  Mood: depressed, anxious, irritable, euphoric  Affect: tearful, inappropriate, labile, reactive  Thought Process: tends to be pressured and circumstantial and preoccupied perseverating on discharge    Thought Content: some paranoia, but does appear as if paranoid  No current suicidal homicidal thoughts intent or plans verbalized  No phobias obsessions compulsions or distorted body perceptions elicited  still feels people are doing hypnosis on her   Perceptual Disturbances: no auditory hallucinations, no visual hallucinations  Hx Risk Factors: chronic mood disorder, chronic psychotic symptoms, alcohol use  Sensorium:    orinted to 3 spheres and to situation  Cognition: recent and remote memory grossly intact  Consciousness: alert and awake  Attention: attention span and concentration are age appropriate  Intellect: appears to be of average intelligence  Insight: poor  Judgement: impaired  Motor Activity: no abnormal movements     Vitals  Temp:  [97 5 °F (36 4 °C)-97 7 °F (36 5 °C)] 97 7 °F (36 5 °C)  HR:  [66-98] 81  Resp:  [15-18] 15  BP: (102-114)/(57-69) 109/57  No intake or output data in the 24 hours ending 04/13/21 0546    Lab Results:  [facility] Admission Reviewed EKG shows increasing       Current Facility-Administered Medications   Medication Dose Route Frequency Provider Last Rate    acetaminophen  650 mg Oral Q6H PRN Dwana Freeze Lodics, CRNP      acetaminophen  650 mg Oral Q4H PRN Dwana Freeze Lodics, CRNP      acetaminophen  975 mg Oral Q6H PRN Dwana Freeze Lodics, CRNP      albuterol  2 puff Inhalation Q6H PRN Dwana Freeze Lodics, CRNP      benztropine  1 mg Intramuscular Q4H PRN Max 6/day Isis N Lodics, CRNP      benztropine  1 mg Oral Q4H PRN Max 6/day Isis N Lodics, CRNP      benztropine  1 mg Oral HS Isis N Lodics, CRNP      calcium carbonate  500 mg Oral TID PRN Dwana Freeze Lodics, CRNP      clonazePAM  0 5 mg Oral BID Courtney Suarez MD      diphenhydrAMINE  25 mg Oral HS Isis N Lodics, CRNP      escitalopram  20 mg Oral Daily Isis N Lodics, CRNP      fenofibrate  145 mg Oral Daily Isis N Lodics, CRNP      gabapentin  600 mg Oral 4x Daily JARED Waters  haloperidol  5 mg Oral Q6H PRN Isis NAGEL Lodics, CRNP      hydrOXYzine HCL  25 mg Oral Q6H PRN Max 4/day Isis Porterics, CRNP      hydrOXYzine HCL  50 mg Oral Q6H PRN Max 4/day Isis Porterics, CRNP      lidocaine  1 patch Topical Daily Isis Porterics, CRNP      LORazepam  1 mg Intramuscular Q8H PRN Dmitriy Ornelas MD      LORazepam  1 mg Oral Q8H PRN Isis Porterics, CRNP      magnesium hydroxide  30 mL Oral Daily PRN Lindsey Side Lodics, CRNP      nicotine  1 patch Transdermal Daily Isis Porterics, CRNP      OLANZapine  10 mg Oral Q3H PRN Isis NAGEL Lodics, CRNP      OLANZapine  2 5 mg Oral Q8H PRN Isis Porterics, CRNP      OLANZapine  5 mg Oral Q3H PRN Isis NAGEL Lodics, CRNP      OLANZapine  10 mg Intramuscular Q3H PRN Isis NAGEL Lodics, CRNP      OLANZapine  5 mg Intramuscular Q3H PRN Lindsey Side Lodics, CRNP      Pramox-PE-Glycerin-Petrolatum  1 application Rectal 4x Daily PRN Isis Porterics, CRNP      prazosin  1 mg Oral HS Isis Lama, CRNP      risperiDONE  4 mg Oral BID Dmitriy Ornelas MD         Counseling / Coordination of Care: Total floor / unit time spent today 15 minutes  Greater than 50% of total time was spent with the patient and / or family counseling and / or somewhat receptive to supportive listening and teaching positive coping skills to deal with symptom mangement  Patient's Rights, confidentiality and exceptions to confidentiality, use of automated medical record, [facility] staff access to medical record, and consent to treatment reviewed  This note has been dictated

## 2021-04-13 NOTE — NURSING NOTE
Compliant with routine medications, napping this evening slept through groups, will continue to monitor

## 2021-04-13 NOTE — PROGRESS NOTES
04/13/21 0830   Team Meeting   Meeting Type Daily Rounds   Initial Conference Date 04/13/21   Patient/Family Present   Patient Present No   Patient's Family Present No     Daily Rounds Documentation     Team Members Present:   Dr Carrie Barrera, MD Layla Venegas, RN  Alexx Lux, RN  Maria A Amaya, CPS  Amarilis Farooq, LSW  Samantha De La Rosa, DECLANW    Demanding and irritable; required multiple PRNs (Zyprexa, Atarax twice, and Ativan)  Attended 2/6 groups  Compliant with medications and meals  Slept

## 2021-04-13 NOTE — PROGRESS NOTES
04/13/21 0900   Activity/Group Checklist   Group Community meeting   Attendance Did not attend   Attendance Duration (min) 16-30   Affect/Mood ASHLEY

## 2021-04-13 NOTE — NURSING NOTE
Out of bed in the acharya pacing, complains of severe anxiety, identified reading and journaling as coping skills, feeling frustrated about not sleeping well, but was napping a lot on evening shift, irritable edge, angry undertone stating, "I don't like being up this early! I just want to get back to sleep but I'll try reading and journaling and see what happens    Ativan 1mg given at this time

## 2021-04-13 NOTE — PROGRESS NOTES
04/13/21 0700   Activity/Group Checklist   Group   (Coffee Talk )   Attendance Attended   Attendance Duration (min) 46-60   Interactions Interacted appropriately   Affect/Mood Appropriate;Bright;Calm;Normal range   Goals Achieved Identified feelings; Able to listen to others; Able to engage in interactions

## 2021-04-13 NOTE — PROGRESS NOTES
04/13/21 1137   Team Meeting   Meeting Type Tx Team Meeting   Initial Conference Date 04/13/21   Next Conference Date 04/20/21   Team Members Present   Team Members Present Physician;Nurse;; Other (Discipline and Name)   Physician Team Member Dr Cristhian Martin MD   Nursing Team Member Selene Loco RN   Social Work Team Member MARK ANTHONY Martinez   Other (Discipline and Name) [facility] , [facility]   Patient/Family Present   Patient Present Yes   Patient's Family Present No     Summary  Patient presented for her treatment team this morning with a completed self assessment  Patient still has irritable edge, though is less tense than her presentation yesterday, able to engage in meaningful conversation without becoming too defensive  Patient reported her main barrier is to get organized "to attend groups " Her coping skill is "being aware" and "breathing techniques " Patient noted a goal is to use a therapeutic resource on the internet (will follow up with SW, as she had questions and SW still needs to clarify the resource she is referring to)  Her smart goal is to engage in therapy and keep up with her mental health techniques and psychiatrist's advice  Patient denies missed medications  Per RN report she has been medication seeking- has been asking for and taking multiple PRNS daily  Patient reported she knows her medications and denies reaction or issues  She did report eye issues, lumps in her breast, and teeth issues which she has already reported to her nurse but had additional questions about seeing specialists (if not in hospital, then as OP)  Dental consult pending  Depending upon her readiness to leave hospital and have OP visit, SW will arrange once clarified  Patient practices self care by dental care, showering, staying clothed  Discussion also addressed the absence of day passes due to COVID, and the uncertainty if this will be introduced again  Patient understanding   She expressed her goal is to discharge to a group home, and wanted to know when this will occur  Psychiatrist assured her that as she progresses, this topic will be addressed and it will become clearer as to what her options are for group home placement  Meeting ended mutually

## 2021-04-13 NOTE — NURSING NOTE
Complained of nightmares and anxiety, atarax 25mg given at this time, out of bed in the acharya pacing, gait steady, comfort measures provided, returned to bed will continue to monitor

## 2021-04-14 PROCEDURE — 99232 SBSQ HOSP IP/OBS MODERATE 35: CPT | Performed by: PSYCHIATRY & NEUROLOGY

## 2021-04-14 RX ADMIN — OLANZAPINE 5 MG: 5 TABLET, FILM COATED ORAL at 08:43

## 2021-04-14 RX ADMIN — OLANZAPINE 5 MG: 5 TABLET, FILM COATED ORAL at 17:29

## 2021-04-14 RX ADMIN — NICOTINE 1 PATCH: 21 PATCH, EXTENDED RELEASE TRANSDERMAL at 08:45

## 2021-04-14 RX ADMIN — FENOFIBRATE 145 MG: 145 TABLET ORAL at 08:46

## 2021-04-14 RX ADMIN — ESCITALOPRAM OXALATE 20 MG: 10 TABLET ORAL at 08:43

## 2021-04-14 RX ADMIN — CLONAZEPAM 0.5 MG: 0.5 TABLET ORAL at 21:32

## 2021-04-14 RX ADMIN — GABAPENTIN 600 MG: 300 CAPSULE ORAL at 17:29

## 2021-04-14 RX ADMIN — GABAPENTIN 600 MG: 300 CAPSULE ORAL at 08:43

## 2021-04-14 RX ADMIN — BENZTROPINE MESYLATE 1 MG: 1 TABLET ORAL at 21:32

## 2021-04-14 RX ADMIN — OLANZAPINE 10 MG: 10 TABLET, ORALLY DISINTEGRATING ORAL at 11:27

## 2021-04-14 RX ADMIN — GABAPENTIN 600 MG: 300 CAPSULE ORAL at 11:26

## 2021-04-14 RX ADMIN — GABAPENTIN 600 MG: 300 CAPSULE ORAL at 21:32

## 2021-04-14 RX ADMIN — DIPHENHYDRAMINE HCL 25 MG: 25 TABLET ORAL at 21:32

## 2021-04-14 RX ADMIN — LIDOCAINE 1 PATCH: 50 PATCH CUTANEOUS at 08:42

## 2021-04-14 NOTE — NURSING NOTE
Patient is compliant with medication and meals   She continues to to be dilutional at times at these times she yells on top of her  Lungs Get out of here ,Get out of my head  And then she yells I hate  You  She had   zyprexa 10  Mg at 1127 for extreme agitation She settled down for a little while   Then she started up again screaming

## 2021-04-14 NOTE — PLAN OF CARE
Problem: Alteration in Thoughts and Perception  Goal: Verbalize thoughts and feelings  Description: Interventions:  - Promote a nonjudgmental and trusting relationship with the patient through active listening and therapeutic communication  - Assess patient's level of functioning, behavior and potential for risk  - Engage patient in 1 on 1 interactions  - Encourage patient to express fears, feelings, frustrations, and discuss symptoms    - [place] patient to reality, help patient recognize reality-based thinking   - Administer medications as ordered and assess for potential side effects  - Provide the patient education related to the signs and symptoms of the illness and desired effects of prescribed medications  Outcome: Progressing  Goal: Refrain from acting on delusional thinking/internal stimuli  Description: Interventions:  - Monitor patient closely, per order   - Utilize least restrictive measures   - Set reasonable limits, give positive feedback for acceptable   - Administer medications as ordered and monitor of potential side effects  Outcome: Progressing  Goal: Agree to be compliant with medication regime, as prescribed and report medication side effects  Description: Interventions:  - Offer appropriate PRN medication and supervise ingestion; conduct AIMS, as needed   Outcome: Progressing  Goal: Attend and participate in unit activities, including therapeutic, recreational, and educational groups  Description: Interventions:  -Encourage Visitation and family involvement in care  Outcome: Progressing     Problem: Ineffective Coping  Goal: Participates in unit activities  Description: Interventions:  - Provide therapeutic environment   - Provide required programming   - Redirect inappropriate behaviors   Outcome: Progressing  Goal: Understands least restrictive measures  Description: Interventions:  - Utilize least restrictive behavior  Outcome: Progressing     Problem: Risk for Self Injury/Neglect  Goal: Verbalize thoughts and feelings  Description: Interventions:  - Assess and re-assess patient's lethality and potential for self-injury  - Engage patient in 1:1 interactions, daily, for a minimum of 15 minutes  - Encourage patient to express feelings, fears, frustrations, hopes  - Establish rapport/trust with patient   Outcome: Progressing     Problem: Depression  Goal: Refrain from isolation  Description: Interventions:  - Develop a trusting relationship   - Encourage socialization   Outcome: Progressing     Problem: Anxiety  Goal: Anxiety is at manageable level  Description: Interventions:  - Assess and monitor patient's anxiety level  - Monitor for signs and symptoms (heart palpitations, chest pain, shortness of breath, headaches, nausea, feeling jumpy, restlessness, irritable, apprehensive)  - Collaborate with interdisciplinary team and initiate plan and interventions as ordered    - [place] patient to unit/surroundings  - Explain treatment plan  - Encourage participation in care  - Encourage verbalization of concerns/fears  - Identify coping mechanisms  - Assist in developing anxiety-reducing skills  - Administer/offer alternative therapies  - Limit or eliminate stimulants  Outcome: Progressing     Problem: Risk for Violence/Aggression Toward Others  Goal: Control angry outbursts  Description: Interventions:  - Monitor patient closely, per order  - Ensure early verbal de-escalation  - Monitor prn medication needs  - Set reasonable/therapeutic limits, outline behavioral expectations, and consequences   - Provide a non-threatening milieu, utilizing the least restrictive interventions   Outcome: Progressing     Problem: Alteration in Orientation  Goal: Interact with staff daily  Description: Interventions:  - Assess and re-assess patient's level of orientation  - Engage patient in 1 on 1 interactions, daily, for a minimum of 15 minutes   - Establish rapport/trust with patient   Outcome: Progressing

## 2021-04-14 NOTE — PROGRESS NOTES
04/14/21 1100   Activity/Group Checklist   Group   ( IMR)   Attendance Attended  (Left group due to symtoms of illness/excused )   Attendance Duration (min) 46-60   Interactions Disorganized interaction   Affect/Mood Wide   Goals Achieved Identified feelings; Identified distorted thoughts/beliefs; Able to listen to others; Able to engage in interactions; Able to reflect/comment on own behavior;Able to self-disclose

## 2021-04-14 NOTE — NURSING NOTE
Linda Hinojosa reporting stools to be small and hard  Requested and given prune juice  LBM 4/13  No bowel regimen ordered will report to 7-3 nurse to attempt to get order for stool softener

## 2021-04-14 NOTE — PROGRESS NOTES
Psychiatry Progress Note [facility] 53 y o  female MRN: [id number]  Unit/Bed#: AMBER LOU [facility] 997-19 Encounter: [id number]  Code Status: Level 1 - Full Code    PCP: Jeremy Allen DO    Date of Admission:  4/7/2021 1435   Date of Service:  04/14/21    Patient Active Problem List   Diagnosis    Schizoaffective disorder, bipolar type ([facility] )    Uncomplicated alcohol dependence ([facility] )    Suicidal behavior    LYNN (generalized anxiety disorder)    Slow transit constipation    Deficiency of vitamin B    Degenerative disc disease, lumbar    Post-traumatic stress disorder, chronic    Lower extremity weakness    Generalized abdominal pain    Non-intractable vomiting    Medical clearance for psychiatric admission    Carbuncle    Alcohol dependence with unspecified alcohol-induced disorder ([facility] )    Mild intermittent asthma without complication    Tobacco abuse    Ear problem, bilateral       Review of systems:     And remarks  Diagnosis:    Schizoaffective bipolar, alcohol use disorder episodic    Assessment   Overall Status:     Webber agitated with mood swings and still not well controlled appearing irritated but no p r n  meds needed yesterday   Certification Statement: The patient will continue to require additional inpatient hospital stay due to ongoing mood swings paranoia   Acceptance by patient:  accepting    Hopefulness in recovery:  Living in a group home   Understanding of medications: has good understanding   Involved in reintegration process:  Adjusting to the unit   Trusting in relationship with psychiatrist:  trusting   Justification for dual anti-psychotics:  Not applicable   Side effects from treatment: none    Medication changes    Refuses any mood stabilizers claiming she wont take lithium, depakote or tegretol and will decrease klonopin as 0 5 mg one po bid due to hx of alcohol use and incidence of not being abl eto be aroused easily yesterday evening  Finally she agreed to give it a try with lithium when reminded that she needs a mood stabilizer but will hold off for now,    Start zyprexa 5 mg po bid instead of Risperdon which is not working for her mood swings     Decrease Klonopin and wean off eventually and stop ativan as she is med seeking and demanding ativan last night     Non-pharmacological treatments   Continue with individual, group, milieu and occupational therapy using recovery principles and psycho-education about accepting illness and the need for treatment  Safety   Safety and communication plan established to target dynamic risk factors discussed above  Discharge Plan     most likely to a group home with the act team    Interval Progress    continues to present with mood swings and delusional about staff doing hypnosis one on talking to herself becoming demanding yelling and screaming at times but in general more redirectable and trying to attend more groups  No need for p r n  meds yesterday  Attending more groups and is accepted the switch from risperidone to Zyprexa without any incidents  Tolerating coming off the Klonopin and discontinue the Ativan as she is med seeking and was abusing alcohol recently  Staff reports she is looking for prune juice because she is fixated on her weight           Sleep:  better   appetite:   good   compliance with medications:  good   Side  Effects:    none   significant events:     Remains somewhat suspicious agitated off and but more redirectable    group attendance:    Beginning to attend 4/5    Mental Status Exam  Appearance: age appropriate, casually dressed, adequate grooming, looks older than stated age, underweight  Wearing a facial mask  Behavior: normal, pleasant, cooperative, appears anxious  Speech: fluent, coherent, hypertalkative,   Circumstantial pressured but redirectable  Mood: depressed, anxious, irritable, euphoric  Affect: tearful, inappropriate, labile, reactive  Thought Process: tends to be pressured and circumstantial and preoccupied perseverating on discharge    Thought Content: some paranoia, but does appear as if paranoid  No current suicidal homicidal thoughts intent or plans verbalized  No phobias obsessions compulsions or distorted body perceptions elicited  Continues to have thoughts about staff doing hypnosis on her  Perceptual Disturbances: no auditory hallucinations, no visual hallucinations  Hx Risk Factors: chronic mood disorder, chronic psychotic symptoms, alcohol use  Sensorium:    Oriented to 3 spheres and to situation  Cognition: recent and remote memory grossly intact  Consciousness: alert and awake  Attention: attention span and concentration are age appropriate  Intellect: appears to be of average intelligence  Insight: improving  Judgement: improving  Motor Activity: no abnormal movements     Vitals  Temp:  [97 2 °F (36 2 °C)-97 4 °F (36 3 °C)] 97 4 °F (36 3 °C)  HR:  [65-81] 76  Resp:  [15-16] 16  BP: ()/(55-77) 109/60  No intake or output data in the 24 hours ending 04/14/21 0557    [facility] Results:  [facility] shows increasing       Current Facility-Administered Medications   Medication Dose Route Frequency Provider Last Rate    acetaminophen  650 mg Oral Q6H PRN Carvel Brittany Lodics, CRNP      acetaminophen  650 mg Oral Q4H PRN Carvel Brittany Lodics, CRNP      acetaminophen  975 mg Oral Q6H PRN Carvel Brittany Lodics, CRNP      albuterol  2 puff Inhalation Q6H PRN Carvel Brittany Lodics, CRNP      benztropine  1 mg Intramuscular Q4H PRN Max 6/day Isis N Lodics, CRNP      benztropine  1 mg Oral Q4H PRN Max 6/day Isis N Lodics, CRNP      benztropine  1 mg Oral HS Isis N Lodics, CRNP      calcium carbonate  500 mg Oral TID PRN Carvel Brittany Lodics, CRNP      clonazePAM  0 5 mg Oral HS Gage Hernández MD      diphenhydrAMINE  25 mg Oral HS Isis N Lodics, CRNP      escitalopram  20 mg Oral Daily Isis N Lodics, CRNP      fenofibrate  145 mg Oral Daily Isis Lama, CRNP      gabapentin  600 mg Oral 4x Daily Isis Lama, CRNP      haloperidol  5 mg Oral Q6H PRN Armando Porterics, CRNP      hydrOXYzine HCL  25 mg Oral Q6H PRN Max 4/day Isis Lama, CRNP      hydrOXYzine HCL  50 mg Oral Q6H PRN Max 4/day Isis Lama, CRNP      lidocaine  1 patch Topical Daily Isis Lama, CRNP      magnesium hydroxide  30 mL Oral Daily PRN Armando Bullzak Porterics, CRNP      nicotine  1 patch Transdermal Daily Isis Lama, CRNP      OLANZapine  10 mg Oral Q3H PRN Isis Porterics, CRNP      OLANZapine  2 5 mg Oral Q8H PRN Isis Lama, CRNP      OLANZapine  5 mg Oral Q3H PRN Isis Lama, CRNP      OLANZapine  10 mg Intramuscular Q3H PRN Isis Lama, CRNP      OLANZapine  5 mg Intramuscular Q3H PRN Isis Lama, CRNP      OLANZapine  5 mg Oral BID Jayne Chapman MD      Pramox-PE-Glycerin-Petrolatum  1 application Rectal 4x Daily PRN Isis Lama, CRNP      prazosin  1 mg Oral HS Isis Lama, CRNP         Counseling / Coordination of Care: Total floor / unit time spent today 15 minutes  Greater than 50% of total time was spent with the patient and / or family counseling and / or somewhat receptive to supportive listening and teaching positive coping skills to deal with symptom mangement  Patient's Rights, confidentiality and exceptions to confidentiality, use of automated medical record, Howie Vernon misbah staff access to medical record, and consent to treatment reviewed  This note has been dictated

## 2021-04-14 NOTE — PLAN OF CARE
Arlyn Schirmer continues to work toward her goals  Attended evening groups  Behavior more controlled

## 2021-04-14 NOTE — NURSING NOTE
Received pt in bed at change of shift with eyes closed; chest movement noted  Continues the same thus this far as per q 7 min room checks  Will continue to monitor

## 2021-04-14 NOTE — PLAN OF CARE
Problem: Alteration in Thoughts and Perception  Goal: Verbalize thoughts and feelings  Description: Interventions:  - Promote a nonjudgmental and trusting relationship with the patient through active listening and therapeutic communication  - Assess patient's level of functioning, behavior and potential for risk  - Engage patient in 1 on 1 interactions  - Encourage patient to express fears, feelings, frustrations, and discuss symptoms    - [place] patient to reality, help patient recognize reality-based thinking   - Administer medications as ordered and assess for potential side effects  - Provide the patient education related to the signs and symptoms of the illness and desired effects of prescribed medications  Outcome: Progressing  Goal: Refrain from acting on delusional thinking/internal stimuli  Description: Interventions:  - Monitor patient closely, per order   - Utilize least restrictive measures   - Set reasonable limits, give positive feedback for acceptable   - Administer medications as ordered and monitor of potential side effects  Outcome: Progressing  Goal: Agree to be compliant with medication regime, as prescribed and report medication side effects  Description: Interventions:  - Offer appropriate PRN medication and supervise ingestion; conduct AIMS, as needed   Outcome: Progressing  Goal: Attend and participate in unit activities, including therapeutic, recreational, and educational groups  Description: Interventions:  -Encourage Visitation and family involvement in care  Outcome: Progressing     Problem: Ineffective Coping  Goal: Participates in unit activities  Description: Interventions:  - Provide therapeutic environment   - Provide required programming   - Redirect inappropriate behaviors   Outcome: Progressing  Goal: Understands least restrictive measures  Description: Interventions:  - Utilize least restrictive behavior  Outcome: Progressing     Problem: Risk for Self Injury/Neglect  Goal: Verbalize thoughts and feelings  Description: Interventions:  - Assess and re-assess patient's lethality and potential for self-injury  - Engage patient in 1:1 interactions, daily, for a minimum of 15 minutes  - Encourage patient to express feelings, fears, frustrations, hopes  - Establish rapport/trust with patient   Outcome: Progressing     Problem: Depression  Goal: Refrain from isolation  Description: Interventions:  - Develop a trusting relationship   - Encourage socialization   Outcome: Progressing     Problem: Anxiety  Goal: Anxiety is at manageable level  Description: Interventions:  - Assess and monitor patient's anxiety level  - Monitor for signs and symptoms (heart palpitations, chest pain, shortness of breath, headaches, nausea, feeling jumpy, restlessness, irritable, apprehensive)  - Collaborate with interdisciplinary team and initiate plan and interventions as ordered    - [place] patient to unit/surroundings  - Explain treatment plan  - Encourage participation in care  - Encourage verbalization of concerns/fears  - Identify coping mechanisms  - Assist in developing anxiety-reducing skills  - Administer/offer alternative therapies  - Limit or eliminate stimulants  Outcome: Progressing     Problem: Risk for Violence/Aggression Toward Others  Goal: Control angry outbursts  Description: Interventions:  - Monitor patient closely, per order  - Ensure early verbal de-escalation  - Monitor prn medication needs  - Set reasonable/therapeutic limits, outline behavioral expectations, and consequences   - Provide a non-threatening milieu, utilizing the least restrictive interventions   Outcome: Progressing     Problem: Alteration in Orientation  Goal: Interact with staff daily  Description: Interventions:  - Assess and re-assess patient's level of orientation  - Engage patient in 1 on 1 interactions, daily, for a minimum of 15 minutes   - Establish rapport/trust with patient   Outcome: Progressing

## 2021-04-14 NOTE — PROGRESS NOTES
04/14/21 0900   Activity/Group Checklist   Group   (Coffee Talk )   Attendance Attended   Attendance Duration (min) 16-30   Interactions Interacted appropriately   Affect/Mood Appropriate;Calm;Normal range   Goals Achieved Able to engage in interactions; Able to listen to others

## 2021-04-14 NOTE — PROGRESS NOTES
04/14/21 1151   Team Meeting   Meeting Type Daily Rounds   Initial Conference Date 04/14/21   Patient/Family Present   Patient Present No   Patient's Family Present No       Daily Rounds Documentation  Team Members Participating  MD Audrey Echeverria, LPN  Yesy Sewell, LSW  Marni Gowers, LSW  Norma Hinkle, ANNALISE Feliz RN    Case reviewed  No outbursts  Does tend to staff-split and lie, also fixated on getting prune juice  h/o eating disorder  4/5 groups

## 2021-04-15 PROCEDURE — 99232 SBSQ HOSP IP/OBS MODERATE 35: CPT | Performed by: PSYCHIATRY & NEUROLOGY

## 2021-04-15 RX ORDER — LORAZEPAM 0.5 MG/1
0.5 TABLET ORAL 4 TIMES DAILY
Status: DISCONTINUED | OUTPATIENT
Start: 2021-04-15 | End: 2021-07-29 | Stop reason: HOSPADM

## 2021-04-15 RX ORDER — LORAZEPAM 2 MG/ML
1 INJECTION INTRAMUSCULAR EVERY 6 HOURS PRN
Status: DISCONTINUED | OUTPATIENT
Start: 2021-04-15 | End: 2021-07-29 | Stop reason: HOSPADM

## 2021-04-15 RX ORDER — OLANZAPINE 5 MG/1
7.5 TABLET, ORALLY DISINTEGRATING ORAL
Status: DISCONTINUED | OUTPATIENT
Start: 2021-04-15 | End: 2021-07-29 | Stop reason: HOSPADM

## 2021-04-15 RX ADMIN — GABAPENTIN 600 MG: 300 CAPSULE ORAL at 17:36

## 2021-04-15 RX ADMIN — LORAZEPAM 0.5 MG: 0.5 TABLET ORAL at 21:10

## 2021-04-15 RX ADMIN — OLANZAPINE 7.5 MG: 2.5 TABLET, FILM COATED ORAL at 17:36

## 2021-04-15 RX ADMIN — NICOTINE 1 PATCH: 21 PATCH, EXTENDED RELEASE TRANSDERMAL at 08:12

## 2021-04-15 RX ADMIN — HYDROXYZINE HYDROCHLORIDE 50 MG: 50 TABLET, FILM COATED ORAL at 10:24

## 2021-04-15 RX ADMIN — ESCITALOPRAM OXALATE 20 MG: 10 TABLET ORAL at 08:11

## 2021-04-15 RX ADMIN — BENZTROPINE MESYLATE 1 MG: 1 TABLET ORAL at 21:10

## 2021-04-15 RX ADMIN — LORAZEPAM 0.5 MG: 0.5 TABLET ORAL at 17:36

## 2021-04-15 RX ADMIN — GABAPENTIN 600 MG: 300 CAPSULE ORAL at 08:11

## 2021-04-15 RX ADMIN — DIPHENHYDRAMINE HCL 25 MG: 25 TABLET ORAL at 21:10

## 2021-04-15 RX ADMIN — FENOFIBRATE 145 MG: 145 TABLET ORAL at 08:11

## 2021-04-15 RX ADMIN — LORAZEPAM 0.5 MG: 0.5 TABLET ORAL at 11:49

## 2021-04-15 RX ADMIN — GABAPENTIN 600 MG: 300 CAPSULE ORAL at 11:48

## 2021-04-15 RX ADMIN — OLANZAPINE 5 MG: 5 TABLET, FILM COATED ORAL at 08:11

## 2021-04-15 RX ADMIN — GABAPENTIN 600 MG: 300 CAPSULE ORAL at 21:10

## 2021-04-15 RX ADMIN — PRAZOSIN HYDROCHLORIDE 1 MG: 1 CAPSULE ORAL at 21:10

## 2021-04-15 RX ADMIN — LIDOCAINE 1 PATCH: 50 PATCH CUTANEOUS at 08:12

## 2021-04-15 NOTE — PLAN OF CARE
Problem: Nutrition/Hydration-ADULT  Goal: Nutrient/Hydration intake appropriate for improving, restoring or maintaining nutritional needs  Description: Monitor and assess patient's nutrition/hydration status for malnutrition  Collaborate with interdisciplinary team and initiate plan and interventions as ordered  Monitor patient's weight and dietary intake as ordered or per policy  Utilize nutrition screening tool and intervene as necessary  Determine patient's food preferences and provide high-protein, high-caloric foods as appropriate       INTERVENTIONS:  - Monitor oral intake, urinary output, labs, and treatment plans  - Assess nutrition and hydration status and recommend course of action  - Evaluate amount of meals eaten  - Assist patient with eating if necessary   - Allow adequate time for meals  - Recommend/ encourage appropriate diets, oral nutritional supplements, and vitamin/mineral supplements  - Order, calculate, and assess calorie counts as needed  - Recommend, monitor, and adjust tube feedings and TPN/PPN based on assessed needs  - Assess need for intravenous fluids  - Provide specific nutrition/hydration education as appropriate  - Include patient/family/caregiver in decisions related to nutrition  Outcome: Progressing     Problem: Alteration in Thoughts and Perception  Goal: Verbalize thoughts and feelings  Description: Interventions:  - Promote a nonjudgmental and trusting relationship with the patient through active listening and therapeutic communication  - Assess patient's level of functioning, behavior and potential for risk  - Engage patient in 1 on 1 interactions  - Encourage patient to express fears, feelings, frustrations, and discuss symptoms    - [place] patient to reality, help patient recognize reality-based thinking   - Administer medications as ordered and assess for potential side effects  - Provide the patient education related to the signs and symptoms of the illness and desired effects of prescribed medications  Outcome: Progressing  Goal: Agree to be compliant with medication regime, as prescribed and report medication side effects  Description: Interventions:  - Offer appropriate PRN medication and supervise ingestion; conduct AIMS, as needed   Outcome: Progressing     Problem: Risk for Self Injury/Neglect  Goal: Refrain from harming self  Description: Interventions:  - Monitor patient closely, per order  - Develop a trusting relationship  - Supervise medication ingestion, monitor effects and side effects   Outcome: Progressing     Problem: Depression  Goal: Refrain from harming self  Description: Interventions:  - Monitor patient closely, per order   - Supervise medication ingestion, monitor effects and side effects   Outcome: Progressing

## 2021-04-15 NOTE — NURSING NOTE
Pt removed self from group and began screaming in room " they're all bitches, fuck you and everything you do to me, I cant take it anymore! Approached pt, pt in bathroom, exited bathroom and conversed with this nurse stating " I dont want to talk to them, I cant deal with this all the time"  Pt raised voice again, began pacing in room and crying  Prn atarax 50mg given at this time for increased anxiety  Pt is currently resting in bed quietly  Will continue to monitor

## 2021-04-15 NOTE — PROGRESS NOTES
04/15/21 1400   Activity/Group Checklist   Group Community meeting   Attendance Attended   Attendance Duration (min) 46-60   Interactions Interacted appropriately   Affect/Mood Appropriate   Goals Achieved Able to listen to others; Able to give feedback to another

## 2021-04-15 NOTE — DISCHARGE SUMMARY
Hope JARED   Nurse Practitioner   Behavioral Health   Discharge Summary       Attested   Date of Service:  4/7/2021  2:58 PM               Attested        Attestation signed by Aubrey Eason MD at 4/7/2021 4:55 PM   I have seen and examined the patient  I have reviewed the findings, discussed the case and agree with the Advanced Practitioner's note, findings and recommendations, with the following additions/exceptions:     Patient seen and examined today prior to discharge EAC  She is reports feeling anxious and continues to have intermittent auditory hallucinations  Continues to complain of difficulty coping at times  Is agreeable to discharge to the [facility]  Patient had difficulty with coping with minor stressors even in the inpatient psychiatric unit, continued to have auditory hallucinations with intermittent agitated outbursts  Discharge to [facility] is appropriate      Diagnosis  Schizoaffective disorder     Plan:    Discharge to [facility], as per [facility] discharge summary               Show:Clear all  [x]Manual[x]Template[x]Copied    Added by:  [x]Isis Echavarria    []Hosea for details     Discharge Summary - Navjot Lamb 53 y o  female MRN: [id number]  Unit/Bed#: [facility] 110-02 Encounter: [id number]     Admission Date: 4/7/2021          Discharge Date: No discharge date for patient encounter      Attending Psychiatrist: Aida Coreas MD     Reason for Admission/HPI:      Schizoaffective Disorder, bipolar type  Post-traumatic stress disorder, chronic     Patient is a 53 y o  female patient admitted on a voluntary 201 commitment basis due to suicidal and homicidal ideation as well as psychosis      Per crisis evaluation completed by Crisis Worker Nohemy Delatorre:     Pt presents due to SI/HI and psychosis  Pt is A&O X 4, medically clear, anxious, manic but  cooperative  Pt was able to answer assessment questions but did have difficulty identifying delusion from reality   Pt denies any current SI/HI or thoughts of harm here in the ED but admits to the report from her mother that she has been making suicidal and homicidal statements at home  Pt states she was having SI/HI because she wasn't eating and she didn't take her meds because she wasn't eating  Pt denies any AH, VH, or delusions but has made multiple statements here in the ED that lead us to believe she is having paranoid delusions but can not identify them   Pt is experiencing increased depression, increased anxiety, and increased irritability  Pt reports she feels somewhat better already having been given her meds  Pt is aware she is in need of further stabilization and is in agreement w/ I/P psych admission  Pt's voluntary paperwork and Rights were reviewed, pt signed her Alma  Pt was offered a copy of her Rights and the Voluntary Pt Handout but requested both be placed on her chart      Per psychiatric evaluation completed by Dr Lesvia eBrmudez:     Dinora Craig a 53 y o  female with a history of Schizoaffective Disorder who was admitted to the inpatient adult psychiatric unit on a voluntary 201 commitment basis due to manic symptoms, unstable mood, psychotic symptoms and disorganized behavior      On evaluation in the inpatient psychiatric unit Jo-Ann is a very poor historian  Mariela Cristobal during the interview is unable to provide any historical information in a cohesive manner due to her labile mood and disorganized thought process    She does report she has not been taking her medications and then tried to explain why with giving a rather arduous story which was difficult to follow about when she came downstairs no one was there, she was living with somebody and somehow her mother was involved as well   This is reflective of patient's disorganized thought process and thought disorder   At some point storing the interview patient breaks down and cries for few seconds then begins speaking again  Griffin Howe has rambling speech and pressured thought which are completely disconnected  Jose A Fulling does however state she stopped her medications which included Neurontin 600 mg 4 times a day, Klonopin 0 5 mg 3 times a day, Lexapro 10 mg daily, and Risperdal 2 mg b i d  In addition to getting an [place] injection which she does not know when she got last or when it will be next due or the dosage  Patient had reported thoughts of self-harm upon presentation in the emergency room however at this time denies active suicidal or homicidal ideation     Hospital Course: The patient was admitted to the inpatient psychiatric unit and started on every 7 minutes precautions  During the hospitalization the patient was attending individual therapy, group therapy, milieu therapy and occupational therapy      Psychiatric medications were titrated over the hospital stay  To address depressive symptoms, mood instability, irritability, manic symptoms and psychotic symptoms the patient was started on antidepressant Lexapro, mood stabilizer Neurontin, antipsychotic medication Risperdal and anxiolytic medication Klonopin  Prazosin 1mg HS was also prescribed for nightmares  Medication doses were titrated during the hospital course  Prior to beginning of treatment medications risks and benefits and possible side effects including risk of parkinsonian symptoms, Tardive Dyskinesia and metabolic syndrome related to treatment with antipsychotic medications, risk of suicidality and serotonin syndrome related to treatment with antidepressants and risks of misuse, abuse or dependence, sedation and respiratory depression related to treatment with benzodiazepine medications were reviewed with the patient   The patient verbalized understanding and agreement for treatment       Because Per Cohen has failed several discharges in the past and remained depressed and was still having periods of psychosis at the end of her stay, it was decided by the team that she would benefit from admission to extended acute care      Carol Broderick was transferred to an Extended Acute Care unit for a long term inpatient psychiatric treatment         Mental Status at time of Discharge:      Appearance:  casually dressed   Behavior:  normal   Speech:  normal pitch and normal volume   Mood:  depressed   Affect:  normal   Thought Process:  normal   Thought Content:  normal   Perceptual Disturbances: intermittent auditory hallucinations   Risk Potential: Patient denies any suicidal or homicidal ideation  Sensorium:  person, place, time/date and situation   Cognition:  recent and remote memory grossly intact   Consciousness:  alert and awake    Attention: attention span and concentration were age appropriate   Insight:  fair   Judgment: fair   Gait/Station: normal gait/station and normal balance   Motor Activity: no abnormal movements      Admission Diagnosis:Depression [F32 9]     Discharge Diagnosis:   Active Problems:    * No active hospital problems  *  Resolved Problems:    * No resolved hospital problems   *           Lab results:        Admission on 03/16/2021, Discharged on 04/07/2021   Component Date Value    WBC 03/16/2021 9 70     RBC 03/16/2021 4 91     Hemoglobin 03/16/2021 14 9     Hematocrit 03/16/2021 45 1     MCV 03/16/2021 92     MCH 03/16/2021 30 3     MCHC 03/16/2021 33 0     RDW 03/16/2021 14 1     MPV 03/16/2021 7 5*    Platelets 14/68/6920 326     Neutrophils Relative 03/16/2021 73     Lymphocytes Relative 03/16/2021 18*    Monocytes Relative 03/16/2021 7     Eosinophils Relative 03/16/2021 1     Basophils Relative 03/16/2021 1     Neutrophils Absolute 03/16/2021 7 10*    Lymphocytes Absolute 03/16/2021 1 70     Monocytes Absolute 03/16/2021 0 70     Eosinophils Absolute 03/16/2021 0 10     Basophils Absolute 03/16/2021 0 00     Sodium 03/16/2021 137     Potassium 03/16/2021 4 1     Chloride 03/16/2021 97*    CO2 03/16/2021 28     ANION GAP 03/16/2021 12     BUN 03/16/2021 17     Creatinine 03/16/2021 0 73     Glucose 03/16/2021 122*    Calcium 03/16/2021 10 3     AST 03/16/2021 22     ALT 03/16/2021 19     Alkaline Phosphatase 03/16/2021 72     Total Protein 03/16/2021 8 1     Albumin 03/16/2021 4 8     Total Bilirubin 03/16/2021 0 50     eGFR 03/16/2021 97     Amph/Meth UR 03/16/2021 Positive*    Barbiturate Ur 03/16/2021 Negative     Benzodiazepine Urine 03/16/2021 Negative     Cocaine Urine 03/16/2021 Negative     Methadone Urine 03/16/2021 Negative     Opiate Urine 03/16/2021 Negative     PCP Ur 03/16/2021 Negative     THC Urine 03/16/2021 Negative     Oxycodone Urine 03/16/2021 Negative     EXT PREG TEST UR (Ref: N* 03/16/2021 negative     Control 03/16/2021 valid     Ethanol Lvl 03/16/2021 <10     SARS-CoV-2 03/16/2021 Negative     INFLUENZA A PCR 03/16/2021 Negative     INFLUENZA B PCR 03/16/2021 Negative     RSV PCR 03/16/2021 Negative     Sodium 03/17/2021 134     Potassium 03/17/2021 4 0     Chloride 03/17/2021 99     CO2 03/17/2021 29     ANION GAP 03/17/2021 6     BUN 03/17/2021 14     Creatinine 03/17/2021 0 56*    Glucose 03/17/2021 112*    Glucose, Fasting 03/17/2021 112*    Calcium 03/17/2021 9 4     AST 03/17/2021 27     ALT 03/17/2021 18     Alkaline Phosphatase 03/17/2021 67     Total Protein 03/17/2021 7 0     Albumin 03/17/2021 4 2     Total Bilirubin 03/17/2021 0 40     eGFR 03/17/2021 110     POC Glucose 03/23/2021 83     Hepatitis B Surface Ag 03/31/2021 Non-reactive     Hepatitis C Ab 03/31/2021 Non-reactive     Hep B C IgM 03/31/2021 Non-reactive     Hep B Core Total Ab 03/31/2021 Non-reactive     HIV-1/HIV-2 Ab 03/31/2021 Non-Reactive     Cholesterol 03/31/2021 300*    Triglycerides 03/31/2021 658*    HDL, Direct 03/31/2021 57     LDL Calculated 03/31/2021      RPR 03/31/2021 Non-Reactive     N gonorrhoeae, DNA Probe 03/31/2021 Negative     Chlamydia trachomatis, D* 03/31/2021 Negative     LDL Direct 03/31/2021 123 1*  Color, UA 04/04/2021 Yellow     Clarity, UA 04/04/2021 Clear     Specific Gravity, UA 04/04/2021 1 025     pH, UA 04/04/2021 5 5     Leukocytes, UA 04/04/2021 Negative     Nitrite, UA 04/04/2021 Negative     Protein, UA 04/04/2021 Negative     Glucose, UA 04/04/2021 Negative     Ketones, UA 04/04/2021 Negative     Urobilinogen, UA 04/04/2021 0 2     Bilirubin, UA 04/04/2021 Negative     Blood, UA 04/04/2021 Negative     SARS-CoV-2 04/06/2021 Negative     INFLUENZA A PCR 04/06/2021 Negative     INFLUENZA B PCR 04/06/2021 Negative     RSV PCR 04/06/2021 Negative          Discharge Medications:       Current Discharge Medication List               Current Discharge Medication List               Current Discharge Medication List               Current Discharge Medication List            CONTINUE these medications which have NOT CHANGED     Details   albuterol (2 5 mg/3 mL) 0 083 % nebulizer solution Take 1 vial (2 5 mg total) by nebulization every 6 (six) hours as needed for wheezing or shortness of breath  Qty: 120 vial, Refills: 5     Associated Diagnoses: Mild intermittent asthmatic bronchitis with acute exacerbation       albuterol (PROVENTIL HFA,VENTOLIN HFA) 90 mcg/act inhaler Inhale 2 puffs every 6 (six) hours as needed for wheezing or shortness of breath  Qty: 1 Inhaler, Refills: 5     Comments: Substitution to a formulary equivalent within the same pharmaceutical class is authorized    Associated Diagnoses: Mild intermittent asthmatic bronchitis with acute exacerbation       benztropine (COGENTIN) 1 mg tablet Take 1 tablet (1 mg total) by mouth every 6 (six) hours as needed for tremors (mild extrapyramidal symptoms)  Qty: 30 tablet, Refills: 0     Associated Diagnoses: Schizoaffective disorder, bipolar type (HCC)       clonazePAM (KlonoPIN) 0 5 mg tablet Take 1 tablet (0 5 mg total) by mouth 3 (three) times a day for 10 days  Qty: 30 tablet, Refills: 0     Associated Diagnoses: Schizoaffective disorder, bipolar type (HCC)       fluticasone (FLONASE) 50 mcg/act nasal spray 1 spray into each nostril daily  Qty: 1 Bottle, Refills: 5     Associated Diagnoses: Vasomotor rhinitis       gabapentin (NEURONTIN) 400 mg capsule Take 2 capsules (800 mg total) by mouth 3 (three) times a day  Qty: 180 capsule, Refills: 0     Associated Diagnoses: Schizoaffective disorder, bipolar type (HCC)       gabapentin (NEURONTIN) 600 MG tablet Take 600 mg by mouth daily at bedtime       lidocaine (LIDODERM) 5 % Apply 1 patch topically daily for 15 days Remove & Discard patch within 12 hours or as directed by MD  Qty: 15 patch, Refills: 0     Associated Diagnoses: Chronic midline low back pain without sciatica       oxybutynin (DITROPAN-XL) 5 mg 24 hr tablet Take 1 tablet (5 mg total) by mouth daily  Qty: 30 tablet, Refills: 0     Associated Diagnoses: Schizoaffective disorder, bipolar type (HCC)       !! risperiDONE (RisperDAL) 1 mg tablet Take 1 mg by mouth daily in the early morning       !! risperiDONE (RisperDAL) 2 mg tablet Take 2 mg by mouth daily at bedtime       simvastatin (ZOCOR) 20 mg tablet Take 1 tablet (20 mg total) by mouth daily at bedtime for 90 days  Qty: 30 tablet, Refills: 2     Associated Diagnoses: Hyperlipidemia, unspecified hyperlipidemia type       thiamine 100 MG tablet Take 1 tablet (100 mg total) by mouth daily  Qty: 30 tablet, Refills: 0     Associated Diagnoses: Schizoaffective disorder, bipolar type (HCC)        !! - Potential duplicate medications found  Please discuss with provider              Discharge instructions/Information to patient and family:   See after visit summary for information provided to patient and family        Provisions for Follow-Up Care:  See after visit summary for information related to follow-up care and any pertinent home health orders        Discharge Statement   I spent 30 minutes discharging the patient  This time was spent on the day of discharge   I had direct contact with the patient on the day of discharge   Additional documentation is required if more than 30 minutes were spent on discharge          I reviewed with Jackelyn Masterson importance of compliance with medications and outpatient treatment after discharge                         Cosigned by: Ras Siddiqui MD at 4/7/2021  4:55 PM   Revision History

## 2021-04-15 NOTE — NURSING NOTE
Patient remains labile and loud at times  Yelling and screaming derogatory statements  She has to be redirected multiple times throughout the day and she is able to maintain control when she is redirected  She was asked one time this afternoon to go into the observation room because she was pushing chairs around the dining room and cursing  She was compliant with this  Her room was changed so that she is in a room of her own due to her frequent outbursts  Continue to monitor

## 2021-04-15 NOTE — PROGRESS NOTES
Psychiatry Progress Note [facility] 53 y o  female MRN: [id number]  Unit/Bed#: [facility] 936-75 Encounter: [id number]  Code Status: Level 1 - Full Code    PCP: Adam Calvillo DO    Date of Admission:  4/7/2021 1435   Date of Service:  04/15/21    Patient Active Problem List   Diagnosis    Schizoaffective disorder, bipolar type ([facility] )    Uncomplicated alcohol dependence ([place] [address] )    Suicidal behavior    LYNN (generalized anxiety disorder)    Slow transit constipation    Deficiency of vitamin B    Degenerative disc disease, lumbar    Post-traumatic stress disorder, chronic    Lower extremity weakness    Generalized abdominal pain    Non-intractable vomiting    Medical clearance for psychiatric admission    Carbuncle    Alcohol dependence with unspecified alcohol-induced disorder ([place] [address] )    Mild intermittent asthma without complication    Tobacco abuse    Ear problem, bilateral       Review of systems:     unremarkable  Diagnosis:     Schizoaffective bipolar, alcohol use episodic in early remission in controlled environment    Assessment   Overall Status:      Agitated silverman but in better control and needed p r n  so Zyprexa for loud yelling around noontime yesterday and remains somewhat suspicious and paranoid   Certification Statement: The patient will continue to require additional inpatient hospital stay due to ongoing mood swings paranoia   Acceptance by patient:  accepting    Hopefulness in recovery:  Living in a group home   Understanding of medications: has good understanding   Involved in reintegration process:  Adjusting to the unit   Trusting in relationship with psychiatrist:  trusting   Justification for dual anti-psychotics:  Not applicable   Side effects from treatment: none    Medication changes     will add Ativan again were excessive anxiety and stop the Klonopin and will increase the Zyprexa and see if that helps to stabilize her mood    Will explore adding lithium in future and she is states she was not allergic to it but had some side effects from it and that may be reconsidered    Non-pharmacological treatments   Continue with individual, group, milieu and occupational therapy using recovery principles and psycho-education about accepting illness and the need for treatment  Safety   Safety and communication plan established to target dynamic risk factors discussed above  Discharge Plan     most likely to a group home with the act team    Interval Progress     Continues to be delusional with mood swings accusing staff of doing hypnosis on her and was yelling loudly to get out of her heard and needed p r n  Zyprexa around noontime yesterday  Otherwise more redirectable friendly and she believes Zyprexa is significantly helping her compared to Risperdal she was on before the speech  He is still somewhat med seeking and is fixated on her weight  She is still hoping to be referred to a group home as she feels she cannot live on her own  She can be labile and agitated suspicious paranoid and easily irritated but redirectable    Was screaming and yelling throughout this morning demanding more Zyprexa claiming she is psychotic   Sleep:   good   appetite:    good   compliance with medications:   good   Side  Effects:     none   significant events:     Is somewhat suspicious agitated but more redirectable   group attendance:     Attends majority of the groups    Mental Status Exam  Appearance: age appropriate, casually dressed, adequate grooming, looks older than stated age, underweight  Found laying on bed yelling and screaming repeatedly  Behavior: normal, pleasant, cooperative, appears anxious  Speech: fluent, coherent, hypertalkative,   Circumstantial pressured  Not easily redirectable  Mood: depressed, anxious, irritable, euphoric  Affect: tearful, inappropriate, labile, reactive  Thought Process: tends to be pressured and circumstantial and preoccupied perseverating on discharge    Thought Content: some paranoia, but does appear as if paranoid  No current suicidal homicidal thoughts intent or plans verbalized  No phobias obsessions compulsions or distorted body perceptions elicited  Still believes staff is doing hypnosis on her  Perceptual Disturbances: no auditory hallucinations, no visual hallucinations  Hx Risk Factors: chronic mood disorder, chronic psychotic symptoms, alcohol use  Sensorium:     Oriented to 3 spheres and to situation  Cognition: recent and remote memory grossly intact  Consciousness: alert and awake  Attention: attention span and concentration are age appropriate  Intellect: appears to be of average intelligence  Insight: improving  Judgement: improving  Motor Activity: no abnormal movements     Vitals  Temp:  [97 8 °F (36 6 °C)-97 9 °F (36 6 °C)] 97 9 °F (36 6 °C)  HR:  [74-87] 74  Resp:  [16-18] 16  BP: (104-125)/(56-76) 104/56  No intake or output data in the 24 hours ending 04/15/21 0626    Lab Results:  [facility] Admission Reviewed EKG shows increasing       Current Facility-Administered Medications   Medication Dose Route Frequency Provider Last Rate    acetaminophen  650 mg Oral Q6H PRN Kaleb Habermann Lodics, CRNP      acetaminophen  650 mg Oral Q4H PRN Kaleb Habermann Lodics, CRNP      acetaminophen  975 mg Oral Q6H PRN Kaleb Habermann Lodics, CRNP      albuterol  2 puff Inhalation Q6H PRN Kaleb Habermann Lodics, CRNP      benztropine  1 mg Intramuscular Q4H PRN Max 6/day Isis N Lodics, CRNP      benztropine  1 mg Oral Q4H PRN Max 6/day Isis N Lodics, CRNP      benztropine  1 mg Oral HS Isis N Lodics, CRNP      calcium carbonate  500 mg Oral TID PRN Kaleb Habermann Lodics, CRNP      clonazePAM  0 5 mg Oral HS Antonia Turner MD      diphenhydrAMINE  25 mg Oral HS Isis N Lodics, CRNP      escitalopram  20 mg Oral Daily Isis N Lodics, CRNP      fenofibrate  145 mg Oral Daily Kaleb Habermann Lodics, CRNP      gabapentin  600 mg Oral 4x Daily Isis Lama, CRNP      haloperidol  5 mg Oral Q6H PRN Sara Porterics, CRNP      hydrOXYzine HCL  25 mg Oral Q6H PRN Max 4/day Isis Lama, CRNP      hydrOXYzine HCL  50 mg Oral Q6H PRN Max 4/day Isis Lama, CRNP      lidocaine  1 patch Topical Daily Isis Lama, CRNP      magnesium hydroxide  30 mL Oral Daily PRN Sara Lama, CRNP      nicotine  1 patch Transdermal Daily Isis Lama, CRNP      OLANZapine  10 mg Oral Q3H PRN Isis Lama, CRNP      OLANZapine  2 5 mg Oral Q8H PRN Isis Lama, CRNP      OLANZapine  5 mg Oral Q3H PRN Isis Lama, CRNP      OLANZapine  10 mg Intramuscular Q3H PRN Isis Porterics, CRNP      OLANZapine  5 mg Intramuscular Q3H PRN Isis Lama, CRNP      OLANZapine  5 mg Oral BID Arcenio Bañuelos MD      Pramox-PE-Glycerin-Petrolatum  1 application Rectal 4x Daily PRN Isis Lama, CRNP      prazosin  1 mg Oral HS Isis Lama, CRNP         Counseling / Coordination of Care: Total floor / unit time spent today 15 minutes  Greater than 50% of total time was spent with the patient and / or family counseling and / or somewhat receptive to supportive listening and teaching positive coping skills to deal with symptom mangement  Patient's Rights, confidentiality and exceptions to confidentiality, use of automated medical record, [facility] staff access to medical record, and consent to treatment reviewed  This note has been dictated

## 2021-04-15 NOTE — NURSING NOTE
Started caring for Harris at [id number]  She did a couple laps in the hallway  Quiet and kept to herself  No issues or behaviors tonight  Did not attend groups this evening  Stayed in her room   Did not have snack  Took HS medication  Continue to monitor  Precautions maintained

## 2021-04-15 NOTE — NURSING NOTE
Kathy Mai maintained on ongoing SAFE precaution without incident on this shift   Laying in bed with eyes closed, breath even and unlabored   Q 7 minutes rounding implemented continuously   No behavioral noted   Will continue to monitor

## 2021-04-15 NOTE — NURSING NOTE
Danny Menjivar woke up this morning drinking excessively wanting water, orange juice and then she ask for prune juice  Prune juice not rendered she had a bowel movement yesterday  She was ok with that  She ask for a pencil she sat for 5 minutes and ask for a PRN  This writer ask her to use her coping skill, she started yelling "That what I was trying to do " it not working I need my zyprexa"  This writer offer her atarax for her agitation for her agitation, she decline  "I want zyprexa and my cognetin" "Is that so hard for you to get"  Continues to yell on top of her random things

## 2021-04-15 NOTE — PROGRESS NOTES
04/15/21 0830   Team Meeting   Meeting Type Daily Rounds   Initial Conference Date 04/15/21   Patient/Family Present   Patient Present No   Patient's Family Present No     Daily Rounds Documentation     Team Members Present:   MD Sneha Barnes, RN  Erica Ramirez, RN Dalila Phoenix, DECLANW  MARK ANTHONY Goldman    Constantly screaming and being nasty to staff when request are not met, but then blames psychosis  Attended 4/6 groups  Compliant with medications and meals  Slept

## 2021-04-16 PROCEDURE — 99232 SBSQ HOSP IP/OBS MODERATE 35: CPT | Performed by: STUDENT IN AN ORGANIZED HEALTH CARE EDUCATION/TRAINING PROGRAM

## 2021-04-16 RX ADMIN — LORAZEPAM 0.5 MG: 0.5 TABLET ORAL at 17:33

## 2021-04-16 RX ADMIN — NICOTINE 1 PATCH: 21 PATCH, EXTENDED RELEASE TRANSDERMAL at 08:44

## 2021-04-16 RX ADMIN — LORAZEPAM 0.5 MG: 0.5 TABLET ORAL at 21:18

## 2021-04-16 RX ADMIN — GABAPENTIN 600 MG: 300 CAPSULE ORAL at 08:45

## 2021-04-16 RX ADMIN — LORAZEPAM 0.5 MG: 0.5 TABLET ORAL at 08:45

## 2021-04-16 RX ADMIN — GLYCERIN, PETROLATUM, PHENYLEPHRINE HCL, PRAMOXINE HCL 1 APPLICATION: 144; 2.5; 10; 15 CREAM TOPICAL at 09:59

## 2021-04-16 RX ADMIN — LIDOCAINE 1 PATCH: 50 PATCH CUTANEOUS at 09:15

## 2021-04-16 RX ADMIN — DIPHENHYDRAMINE HCL 25 MG: 25 TABLET ORAL at 21:18

## 2021-04-16 RX ADMIN — GABAPENTIN 600 MG: 300 CAPSULE ORAL at 12:44

## 2021-04-16 RX ADMIN — FENOFIBRATE 145 MG: 145 TABLET ORAL at 08:45

## 2021-04-16 RX ADMIN — ESCITALOPRAM OXALATE 20 MG: 10 TABLET ORAL at 08:45

## 2021-04-16 RX ADMIN — LORAZEPAM 0.5 MG: 0.5 TABLET ORAL at 12:44

## 2021-04-16 RX ADMIN — GABAPENTIN 600 MG: 300 CAPSULE ORAL at 17:33

## 2021-04-16 RX ADMIN — BENZTROPINE MESYLATE 1 MG: 1 TABLET ORAL at 21:17

## 2021-04-16 RX ADMIN — GABAPENTIN 600 MG: 300 CAPSULE ORAL at 21:18

## 2021-04-16 RX ADMIN — OLANZAPINE 7.5 MG: 2.5 TABLET, FILM COATED ORAL at 08:45

## 2021-04-16 RX ADMIN — OLANZAPINE 7.5 MG: 2.5 TABLET, FILM COATED ORAL at 17:33

## 2021-04-16 NOTE — PROGRESS NOTES
Progress Note - Behavioral Health   Roya Shrestha 53 y o  female MRN: [id number]  Unit/Bed#: [facility] 994-87 Encounter: [id number]       Patient was visited on unit for continuing care; chart reviewed and discussed with multidisciplinary treatment team  On approach, the patient was calm and superficially cooperative, minimizing her agitation and irritability yesterday evening (see below)  She reported that she was upset about the uprising situation on the unit, but endorsed feeling better and more refreshed after took a shower this morning  The patient remains paranoid and delusional, expatiated on "people giving [her] wrong medication and forcing [her] to eat food"  She reported tactile "hallucinations" as "being touched" referred to be touched in her "private parts"  Denied A/VH currently  No changes reported in appetite, sleep and energy level  Denied SI/HI, intent or plan upon direct inquiry at this time  The patient was educated about the unit rules and boundaries, and verbal re-assurance and supportive psychotherapy provided  The patient agreed to verbalize any suicidal thoughts, frustrations or concerns to the nursing staff, immediately  As per nursing report, the patient became irritable, agitated, yelling and cursing yesterday evening during the uprising situation on the unit, but was finally redirectable verbally and did not require PRN meds  Patient accepted all offered medications and reported feeling the same  No adverse effects of medications noted or reported        Current Mental Status Evaluation:  Appearance and attitude: appeared as stated age, casually dressed with good hygiene, sitting in her room  Eye contact: good  Motor Function: within normal limits, intact gait, No PMA/PMR  Gait/station: normal gait/station and normal balance  Speech: normal for rate, rhythm, volume, latency, amount  Language: Able to name objects and Able to repeat phrases  Mood/affect: dysphoric / Affect was constricted but reactive, mood congruent  Thought Processes: concrete  Thought content: denied suicidal ideations or homicidal ideations, paranoid delusions Associations: concrete associations  Perceptual disturbances: denies Auditory/Visual/Tactile Hallucinations  Orientation: oriented to time, person, place and to the situational context  Cognitive Function: intact  Memory: intact short-term and long-term  Intellect: unable to assess  Fund of knowledge: aware of current events, aware of past history and vocabulary average  Impulse control: good  Insight/judgment: poor/limited    Vital signs in last 24 hours:    Temp:  [97 4 °F (36 3 °C)-97 8 °F (36 6 °C)] 97 4 °F (36 3 °C)  HR:  [64-76] 64  Resp:  [18] 18  BP: (107-118)/(53-69) 107/53    Laboratory results: I have personally reviewed all pertinent laboratory/tests results    Most Recent Labs:   Lab Results   Component Value Date    WBC 6 80 04/09/2021    RBC 4 32 04/09/2021    HGB 13 0 04/09/2021    HCT 39 5 04/09/2021     04/09/2021    RDW 14 3 04/09/2021    NEUTROABS 7 10 (H) 03/16/2021    SODIUM 139 04/09/2021    K 5 0 04/09/2021     04/09/2021    CO2 29 04/09/2021    BUN 15 04/09/2021    CREATININE 0 58 (L) 04/09/2021    GLUC 91 04/09/2021    CALCIUM 9 6 04/09/2021    AST 21 04/09/2021    ALT 17 04/09/2021    ALKPHOS 60 04/09/2021    TP 6 9 04/09/2021    ALB 4 0 04/09/2021    TBILI 0 20 04/09/2021    CHOLESTEROL 300 (H) 03/31/2021    HDL 57 03/31/2021    TRIG 658 (H) 03/31/2021    [address]  03/31/2021      Comment:      Calculated LDL invalid, triglycerides >400 mg/dl    NONHDLC 147 08/23/2020    AMMONIA 28 10/27/2017    [id number] 3 810 04/09/2021    FREET4 0 89 03/24/2016    PREGUR negative 03/16/2021    PREGSERUM Negative 10/21/2019    HCG <2 00 04/10/2014    RPR Non-Reactive 03/31/2021    HGBA1C 5 0 08/06/2019    EAG 97 08/06/2019       Progress Toward Goals: limited    Assessment:  Principal Problem:    Schizoaffective disorder, bipolar type [facility])  Active Problems:    Post-traumatic stress disorder, chronic    Medical clearance for psychiatric admission    Mild intermittent asthma without complication    Ear problem, bilateral        Plan:  - Continue medication titration and treatment plan; adjust medication to optimize treatment response and as clinically indicated       Scheduled medications:  Current Facility-Administered Medications   Medication Dose Route Frequency Provider Last Rate    acetaminophen  650 mg Oral Q6H PRN Venancio Aloe Lodics, CRNP      acetaminophen  650 mg Oral Q4H PRN Venancio Aloe Lodics, CRNP      acetaminophen  975 mg Oral Q6H PRN Venancio Aloe Lodics, CRNP      albuterol  2 puff Inhalation Q6H PRN Venancio Aloe Lodics, CRNP      benztropine  1 mg Intramuscular Q4H PRN Max 6/day Isis Porterics, CRNP      benztropine  1 mg Oral Q4H PRN Max 6/day Isis Porterics, CRNP      benztropine  1 mg Oral HS Isis Porterics, CRNP      calcium carbonate  500 mg Oral TID PRN Venancio Aloe Lodics, CRNP      diphenhydrAMINE  25 mg Oral HS Isis Porterics, CRNP      escitalopram  20 mg Oral Daily Isis Lama, CRNP      fenofibrate  145 mg Oral Daily Isis Lama, CRNP      gabapentin  600 mg Oral 4x Daily Isis Lama, CRNP      haloperidol  5 mg Oral Q6H PRN Isis Lama, CRNP      hydrOXYzine HCL  25 mg Oral Q6H PRN Max 4/day Isis Porterics, CRNP      hydrOXYzine HCL  50 mg Oral Q6H PRN Max 4/day Isis Porterics, CRNP      lidocaine  1 patch Topical Daily Isis Lama, CRNP      LORazepam  1 mg Intramuscular Q6H PRN Shefali Stanley MD      LORazepam  0 5 mg Oral 4x Daily Shefali Stanley MD      magnesium hydroxide  30 mL Oral Daily PRN Venancio Aloe Lodics, CRNP      nicotine  1 patch Transdermal Daily Isis Porterics, CRNP      OLANZapine  2 5 mg Oral Q8H PRN Isis Porterics, CRNP      OLANZapine  5 mg Oral Q3H PRN Venancio Aloe Lodics, CRNP      OLANZapine  7 5 mg Oral Q3H PRN MD Enmanuel Harrison OLANZapine  10 mg Intramuscular Q3H PRN Isis Porterics, CRNP      OLANZapine  5 mg Intramuscular Q3H PRN Isis Porterics, CRNP      OLANZapine  7 5 mg Oral BID Padmini Nelson MD      Pramox-PE-Glycerin-Petrolatum  1 application Rectal 4x Daily PRN Isis NAGEL Lodics, CRNP      prazosin  1 mg Oral HS Isis Porterics, CRNP          PRN:    acetaminophen    acetaminophen    acetaminophen    albuterol    benztropine    benztropine    calcium carbonate    haloperidol    hydrOXYzine HCL    hydrOXYzine HCL    LORazepam    magnesium hydroxide    OLANZapine    OLANZapine    OLANZapine    OLANZapine    OLANZapine    Pramox-PE-Glycerin-Petrolatum    - Observation: routine    - VS: as per unit protocol    - Diet: Regular diet  - Psychoeducation (benefits and potential risks) discussed, importance of compliance with the psychiatric treatment reiterated, and the patient verbalized understanding of the matter  - Encourage group attendance    - The pt was educated and agreed to verbalize any suicidal thoughts, frustrations or concerns to the nursing staff, immediately  - Dispo: pending psychiatric stabilization      Next of Kin  · Extended Emergency Contact Information  · Primary Emergency Contact: Debbie Haley  · Mobile Phone: [phone]  · Relation:   · Secondary Emergency Contact: Jet Glover  · Work Phone: [phone]  · Relation:       Counseling / Coordination of Care     Total floor / unit time spent today 20 minutes  Greater than 50% of total time was spent with the patient and / or family counseling and / or coordination of care  A description of the counseling / coordination of care  Patient's Rights, confidentiality and exceptions to confidentiality, use of automated medical record, [facility] staff access to medical record, and consent to treatment reviewed      Jeanie Bahena MD  Attending P O  [address]

## 2021-04-16 NOTE — SOCIAL WORK
Pleasant and cooperative during male peer's graduation party  Ate ice cream and had decaf coffee  Appropriate and social, shared some sentiments and well wishes for her peer who is being discharged  At end of party asked if she could use the phone since group time had ended

## 2021-04-16 NOTE — SOCIAL WORK
Summary    SW met with patient for individual therapy on unit (conference room) for visibility, as patient has been very labile, yelling, explosive intermittently  Patient was agreeable to meet, and was able to manage a few minutes speaking with some composure, however soon became very verbally aggressive, accusatory, and paranoid (SW had asked a few exploratory, neutral and open-ended questions and patient stated she did not appreciate SW trying to "fuck with my head"    went on to talk about her history of being hypnotized  Genoveva Mena ) "I'm hypnotized every day, its always those people    it's always these people telling me things [grew more tearful while sharing] I can't handle it anymore, I don't want to lose my mind but I'm losing my mind   " Patient became rapidly agitated, tearful, angry and explosive (triggering herself as she continued to talk and not allow SW to speak), within short intervals vacillating amongst these moods, having difficulty expressing herself or describing her internal experiences, at one point become accusatory  Genoveva Mena "What kind of therapist are you, that you have to ask me questions? Shouldn't you know???"     SETH remained calm and provided active listening, empathy, validation, modeling controlled behavior  Patient tangential, erratic, and barely able to complete a thought or respond to feedback  Towards the end of the session patient requested for help in calling social security and requested for documentation needed, which the office later faxed to SETH  She was able to calm down for the phone call and stay on hold patiently, until the task was completed  She then demanded for SETH to speak with psychiatrist about her medications, her biggest issue having medications changed, and being informed this week that certain medication she's been on for years she is being taken off of  She also denies her alcoholism and chronic d/o alcohol dependence       SETH will focus on building rapport and continue to explore with patient what her goals are (other than find a group home) while in the program

## 2021-04-16 NOTE — SOCIAL WORK
SS letter received via fax after placing PC with patient yesterday to [employer] [facility] office, informing them of patient's admission and the need for a letter verifying her income  Upon patient's request letter then faxed to tax entity (DEYA signed) as they had requested for this documentation by mail within 30 days of receipt  Patient satisfied with assistance provided

## 2021-04-16 NOTE — NURSING NOTE
Susie Wayne maintained on ongoing SAFE precaution without incident  She is cooperative thus far  She had  x1 outburst during the time everyone on the unit to stay in their room  Susie Wayne was in her room when she yelled out "why don't you fucking hoes shut the fuck up"  This writer ask her who she was talking to? Susie Wayne " them, those hoes, get them out of here  They not speaking english  She was referring to her roommate who was in their room  Asked her why was she yelling on top of her lungs  The two roommates was sitting on the floor, talking within themselves  Susie Wayne yelled back, you just going to take their side anyway  and slam the door  This writer told her to take control of her behavior and stop slamming the door  Yes their are talking, but it among themselves and they are in their room  Susie Wayne was cooperative with her schedule meds and no further outburst   Will continue to monitor

## 2021-04-16 NOTE — PROGRESS NOTES
04/16/21 0830   Team Meeting   Meeting Type Daily Rounds   Initial Conference Date 04/16/21   Patient/Family Present   Patient Present No   Patient's Family Present No     Daily Rounds Documentation     Team Members Present:   MD Angel Ramsey Junior, RN  Lilian Goldstein, CPS Dalila Phoenix, [address] [place] [address]    Screaming and demanding towards staff  Struggled with request to stay in her room in the evening and room change had to be made as she was yelling at her bathroom mates; slammed her door  Compliant with medications and meals  Slept

## 2021-04-16 NOTE — NURSING NOTE
[facility] maintained on ongoing SAFE precaution without incident on this shift   Laying in bed with eyes closed, breath even and unlabored   Q 7 minutes rounding implemented continuously   No behavioral noted   Will continue to monitor

## 2021-04-16 NOTE — NURSING NOTE
Patient is isolative to self but comes out of her room briefly for meals or to use the phone  She is calm with an irritable edge but behavior remains in control  She denies SI and is medication compliant  She went to bed at 2100 and remained asleep, sleeping very soundly   HS Minipress held as BP < parameters

## 2021-04-16 NOTE — PLAN OF CARE
Problem: Alteration in Thoughts and Perception  Goal: Verbalize thoughts and feelings  Description: Interventions:  - Promote a nonjudgmental and trusting relationship with the patient through active listening and therapeutic communication  - Assess patient's level of functioning, behavior and potential for risk  - Engage patient in 1 on 1 interactions  - Encourage patient to express fears, feelings, frustrations, and discuss symptoms    - [place] patient to reality, help patient recognize reality-based thinking   - Administer medications as ordered and assess for potential side effects  - Provide the patient education related to the signs and symptoms of the illness and desired effects of prescribed medications  Outcome: Progressing  Goal: Refrain from acting on delusional thinking/internal stimuli  Description: Interventions:  - Monitor patient closely, per order   - Utilize least restrictive measures   - Set reasonable limits, give positive feedback for acceptable   - Administer medications as ordered and monitor of potential side effects  Outcome: Progressing  Goal: Agree to be compliant with medication regime, as prescribed and report medication side effects  Description: Interventions:  - Offer appropriate PRN medication and supervise ingestion; conduct AIMS, as needed   Outcome: Progressing     Problem: Ineffective Coping  Goal: Free from restraint events  Description: - Utilize least restrictive measures   - Provide behavioral interventions   - Redirect inappropriate behaviors   Outcome: Progressing     Problem: Risk for Self Injury/Neglect  Goal: Verbalize thoughts and feelings  Description: Interventions:  - Assess and re-assess patient's lethality and potential for self-injury  - Engage patient in 1:1 interactions, daily, for a minimum of 15 minutes  - Encourage patient to express feelings, fears, frustrations, hopes  - Establish rapport/trust with patient   Outcome: Progressing  Goal: Refrain from harming self  Description: Interventions:  - Monitor patient closely, per order  - Develop a trusting relationship  - Supervise medication ingestion, monitor effects and side effects   Outcome: Progressing     Problem: Depression  Goal: Refrain from harming self  Description: Interventions:  - Monitor patient closely, per order   - Supervise medication ingestion, monitor effects and side effects   Outcome: Progressing  Goal: Refrain from isolation  Description: Interventions:  - Develop a trusting relationship   - Encourage socialization   Outcome: Progressing  Goal: Refrain from self-neglect  Outcome: Progressing

## 2021-04-16 NOTE — NURSING NOTE
Pt isolative to room most of day, 1 episode of yelling in her room noted  Med and meal compliant  No further behaviors noted   No seizure activity noted

## 2021-04-17 PROCEDURE — 99232 SBSQ HOSP IP/OBS MODERATE 35: CPT | Performed by: NURSE PRACTITIONER

## 2021-04-17 RX ORDER — PRAZOSIN HYDROCHLORIDE 1 MG/1
1 CAPSULE ORAL
Status: DISCONTINUED | OUTPATIENT
Start: 2021-04-17 | End: 2021-07-29 | Stop reason: HOSPADM

## 2021-04-17 RX ADMIN — GABAPENTIN 600 MG: 300 CAPSULE ORAL at 08:20

## 2021-04-17 RX ADMIN — LORAZEPAM 0.5 MG: 0.5 TABLET ORAL at 17:09

## 2021-04-17 RX ADMIN — ESCITALOPRAM OXALATE 20 MG: 10 TABLET ORAL at 08:20

## 2021-04-17 RX ADMIN — GABAPENTIN 600 MG: 300 CAPSULE ORAL at 17:09

## 2021-04-17 RX ADMIN — NICOTINE 1 PATCH: 21 PATCH, EXTENDED RELEASE TRANSDERMAL at 08:21

## 2021-04-17 RX ADMIN — OLANZAPINE 7.5 MG: 2.5 TABLET, FILM COATED ORAL at 08:20

## 2021-04-17 RX ADMIN — LORAZEPAM 0.5 MG: 0.5 TABLET ORAL at 08:20

## 2021-04-17 RX ADMIN — LORAZEPAM 0.5 MG: 0.5 TABLET ORAL at 21:33

## 2021-04-17 RX ADMIN — PHENYTOIN 1 MG: 125 SUSPENSION ORAL at 21:33

## 2021-04-17 RX ADMIN — BENZTROPINE MESYLATE 1 MG: 1 TABLET ORAL at 21:33

## 2021-04-17 RX ADMIN — GABAPENTIN 600 MG: 300 CAPSULE ORAL at 21:32

## 2021-04-17 RX ADMIN — FENOFIBRATE 145 MG: 145 TABLET ORAL at 08:20

## 2021-04-17 RX ADMIN — LORAZEPAM 0.5 MG: 0.5 TABLET ORAL at 12:50

## 2021-04-17 RX ADMIN — OLANZAPINE 7.5 MG: 2.5 TABLET, FILM COATED ORAL at 17:09

## 2021-04-17 RX ADMIN — LIDOCAINE 1 PATCH: 50 PATCH CUTANEOUS at 08:31

## 2021-04-17 RX ADMIN — GABAPENTIN 600 MG: 300 CAPSULE ORAL at 12:50

## 2021-04-17 RX ADMIN — DIPHENHYDRAMINE HCL 25 MG: 25 TABLET ORAL at 21:33

## 2021-04-17 RX ADMIN — OLANZAPINE 2.5 MG: 5 TABLET, ORALLY DISINTEGRATING ORAL at 06:36

## 2021-04-17 NOTE — PROGRESS NOTES
Progress Note - Behavioral Health     Mahalia Ormond 53 y o  female MRN: [id number]   [facility] Encounter: [id number]        Amy Hatch was seen today for continuation of care, records reviewed and patient was discussed with nursing team   Per report, patient has been eating normally, sleeping well, has been attending groups, at times interacting with peers on the unit  The patient has no new concerns overnight  Today, Amy Hatch is initially calm cooperative and pleasant upon approach however she quickly became irritable and agitated when discussing prazosin  She said this provider I am going to need p r n  And not that Atarax because I am going to start to get psychotic  [place] [employer] started to so be somewhat illogically about people giving her incorrect medication and it was challenging to follow anything that she was saying as she just started to scream out random comments such as "they are idiots "  She was able to calm in between outbursts to speak calmly to this provider  At the time she denied auditory or visual hallucinations however she did bring up an episode of tactile hallucinations 2 nights ago of someone touching her private parts    She had intense eye contact, has at times very loud speech and a somewhat illogical and concrete thought process  She reports no auditory hallucinations, no visual hallucinations, no suicidal ideation, no homicidal ideation, and denies delusional thinking  As stated above she reported tactile hallucinations to nights ago  Medication side effects: No   ROS: no complaints, all other systems are negative    Mental Status Evaluation:    Appearance:  casually dressed, adequate grooming   Behavior:   At times agitated, somewhat bizarre, intense eye contact   Speech:  normal rate, normal pitch, loud at times   Mood:  labile, irritable   Affect:  labile   Thought Process:  concrete, Somewhat illogical   Associations: concrete associations   Thought Content:  paranoid delusions Perceptual Disturbances: denies auditory or visual hallucinations when asked, but appears distracted, tactile hallucinations   Risk Potential: Suicidal ideation - None  Homicidal ideation - None  Potential for aggression - No   Sensorium:  oriented to person, place and time/date   Memory:  recent memory intact, remote memory intact   Consciousness:  alert and awake   Attention: attention span and concentration appear shorter than expected for age   Insight:  poor   Judgment: limited   Gait/Station: normal gait/station, normal balance   Motor Activity: no abnormal movements     Vital signs in last 24 hours:    Temp:  [97 6 °F (36 4 °C)] 97 6 °F (36 4 °C)  HR:  [83-86] 83  Resp:  [18] 18  BP: (109-119)/(60-72) 119/72    Laboratory results:  No new labs today    Suicide/Homicide Risk Assessment:    Risk of Harm to Self:   Nursing Suicide Risk Assessment Last 24 hours: C-SSRS Risk (Since Last Contact)  Calculated C-SSRS Risk Score (Since Last Contact): No Risk Indicated    Risk of Harm to Others:  Nursing Homicide Risk Assessment: Violence Risk to Others: Denies within past 6 months    The following interventions are recommended: continued hospitalization on locked unit, Behavioral checks every 15 minutes    Progress Toward Goals: no significant improvement    Assessment/Plan   Principal Problem:    Schizoaffective disorder, bipolar type (HCC)  Active Problems:    Post-traumatic stress disorder, chronic    Medical clearance for psychiatric admission    Mild intermittent asthma without complication    Ear problem, bilateral    Recommended Treatment:     Planned medication and treatment changes: All current active medications have been reviewed  Encourage group therapy, milieu therapy and occupational therapy  Behavioral Health checks every 15 minutes  -continue prazosin 1 mg p o  Q h s   However parameters changed to hold if systolic is less than 148, prazosin has been held frequently as patient's blood pressure will be 197 or 328 systolic  Staff to monitor  Patient educated to sit at edge of bed and get bearings prior to getting up for bathroom etc after dosing of prazosin  Also reminded to hydrate  Parameters changes patient was reporting nightmares  -continue olanzapine 7 5 mg p o  B i d   -continue lorazepam 0 5 mg 4 times daily  -continue gabapentin 600 mg p o  Q i d   -continuing Lexapro 20 mg p o   Daily  -continue Cogentin 1 mg p o  Q h s   -continue all the medications as prescribed  Current Facility-Administered Medications   Medication Dose Route Frequency Provider Last Rate    acetaminophen  650 mg Oral Q6H PRN Trenia Tyrone Lodics, CRNP      acetaminophen  650 mg Oral Q4H PRN Trenia Tyrone Lodics, CRNP      acetaminophen  975 mg Oral Q6H PRN Trenia Tyrone Germanics, CRNP      albuterol  2 puff Inhalation Q6H PRN David Porterics, CRNP      benztropine  1 mg Intramuscular Q4H PRN Max 6/day Isis Porterics, CRNP      benztropine  1 mg Oral Q4H PRN Max 6/day Isis Porterics, CRNP      benztropine  1 mg Oral HS Isis Porterics, CRNP      calcium carbonate  500 mg Oral TID PRN David Porterics, CRNP      diphenhydrAMINE  25 mg Oral HS Isis Lama, CRNP      escitalopram  20 mg Oral Daily Isis Porterics, CRNP      fenofibrate  145 mg Oral Daily Isis Porterics, CRNP      gabapentin  600 mg Oral 4x Daily Isis Porterics, CRNP      haloperidol  5 mg Oral Q6H PRN Isis Porterics, CRNP      hydrOXYzine HCL  25 mg Oral Q6H PRN Max 4/day Isis Porterics, CRNP      hydrOXYzine HCL  50 mg Oral Q6H PRN Max 4/day Isis Porterics, CRNP      lidocaine  1 patch Topical Daily Isis Lama, CRNP      LORazepam  1 mg Intramuscular Q6H PRN Sara Diaz MD      LORazepam  0 5 mg Oral 4x Daily Sara Diaz MD      magnesium hydroxide  30 mL Oral Daily PRN Treashley Porterics, CRNP      nicotine  1 patch Transdermal Daily Isis Porterics, CRNP      OLANZapine  2 5 mg Oral Q8H PRN David Lama, CRNP      OLANZapine  5 mg Oral Q3H PRN Edward Lama, JARED      OLANZapine  7 5 mg Oral Q3H PRN Padmini Nelson MD      OLANZapine  10 mg Intramuscular Q3H PRN Isis Lama, JARED      OLANZapine  5 mg Intramuscular Q3H PRN Isis Lama, JARED      OLANZapine  7 5 mg Oral BID Padmini Nelson MD      Pramox-PE-Glycerin-Petrolatum  1 application Rectal 4x Daily PRN JARED Jones      prazosin  1 mg Oral HS JARED Jones         Risks / Benefits of Treatment:    Risks, benefits, and possible side effects of medications explained to patient and patient verbalizes understanding and agreement for treatment  Counseling / Coordination of Care:    Patient's progress reviewed with nursing staff  Medications, treatment progress and treatment plan reviewed with patient  Supportive counseling provided to the patient      This note was not shared with the patient due to reasonable likelihood of causing patient harm

## 2021-04-17 NOTE — PLAN OF CARE
Problem: Alteration in Thoughts and Perception  Goal: Treatment Goal: Gain control of psychotic behaviors/thinking, reduce/eliminate presenting symptoms and demonstrate improved reality functioning upon discharge  Outcome: Progressing  Goal: Verbalize thoughts and feelings  Description: Interventions:  - Promote a nonjudgmental and trusting relationship with the patient through active listening and therapeutic communication  - Assess patient's level of functioning, behavior and potential for risk  - Engage patient in 1 on 1 interactions  - Encourage patient to express fears, feelings, frustrations, and discuss symptoms    - [place] patient to reality, help patient recognize reality-based thinking   - Administer medications as ordered and assess for potential side effects  - Provide the patient education related to the signs and symptoms of the illness and desired effects of prescribed medications  Outcome: Progressing  Goal: Refrain from acting on delusional thinking/internal stimuli  Description: Interventions:  - Monitor patient closely, per order   - Utilize least restrictive measures   - Set reasonable limits, give positive feedback for acceptable   - Administer medications as ordered and monitor of potential side effects  Outcome: Progressing  Goal: Agree to be compliant with medication regime, as prescribed and report medication side effects  Description: Interventions:  - Offer appropriate PRN medication and supervise ingestion; conduct AIMS, as needed   Outcome: Progressing  Goal: Attend and participate in unit activities, including therapeutic, recreational, and educational groups  Description: Interventions:  -Encourage Visitation and family involvement in care  Outcome: Progressing  Goal: Recognize dysfunctional thoughts, communicate reality-based thoughts at the time of discharge  Description: Interventions:  - Provide medication and psycho-education to assist patient in compliance and developing insight into his/her illness   Outcome: Progressing  Goal: Complete daily ADLs, including personal hygiene independently, as able  Description: Interventions:  - Observe, teach, and assist patient with ADLS  - Monitor and promote a balance of rest/activity, with adequate nutrition and elimination   Outcome: Progressing     Problem: Ineffective Coping  Goal: Cooperates with admission process  Description: Interventions:   - Complete admission process  Outcome: Progressing  Goal: Identifies ineffective coping skills  Outcome: Not Progressing  Goal: Identifies healthy coping skills  Outcome: Progressing  Goal: Demonstrates healthy coping skills  Outcome: Not Progressing  Goal: Participates in unit activities  Description: Interventions:  - Provide therapeutic environment   - Provide required programming   - Redirect inappropriate behaviors   Outcome: Progressing  Goal: Patient/Family participate in treatment and DC plans  Description: Interventions:  - Provide therapeutic environment  Outcome: Progressing  Goal: Patient/Family verbalizes awareness of resources  Outcome: Progressing  Goal: Understands least restrictive measures  Description: Interventions:  - Utilize least restrictive behavior  Outcome: Progressing  Goal: Free from restraint events  Description: - Utilize least restrictive measures   - Provide behavioral interventions   - Redirect inappropriate behaviors   Outcome: Progressing     Problem: Risk for Self Injury/Neglect  Goal: Treatment Goal: Remain safe during length of stay, learn and adopt new coping skills, and be free of self-injurious ideation, impulses and acts at the time of discharge  Outcome: Progressing  Goal: Verbalize thoughts and feelings  Description: Interventions:  - Assess and re-assess patient's lethality and potential for self-injury  - Engage patient in 1:1 interactions, daily, for a minimum of 15 minutes  - Encourage patient to express feelings, fears, frustrations, hopes  - Establish rapport/trust with patient   Outcome: Progressing  Goal: Refrain from harming self  Description: Interventions:  - Monitor patient closely, per order  - Develop a trusting relationship  - Supervise medication ingestion, monitor effects and side effects   Outcome: Progressing  Goal: Attend and participate in unit activities, including therapeutic, recreational, and educational groups  Description: Interventions:  - Provide therapeutic and educational activities daily, encourage attendance and participation, and document same in the medical record  - Obtain collateral information, encourage visitation and family involvement in care   Outcome: Progressing  Goal: Recognize maladaptive responses and adopt new coping mechanisms  Outcome: Not Progressing  Goal: Complete daily ADLs, including personal hygiene independently, as able  Description: Interventions:  - Observe, teach, and assist patient with ADLS  - Monitor and promote a balance of rest/activity, with adequate nutrition and elimination  Outcome: Progressing     Problem: Depression  Goal: Treatment Goal: Demonstrate behavioral control of depressive symptoms, verbalize feelings of improved mood/affect, and adopt new coping skills prior to discharge  Outcome: Not Progressing  Goal: Verbalize thoughts and feelings  Description: Interventions:  - Assess and re-assess patient's level of risk   - Engage patient in 1:1 interactions, daily, for a minimum of 15 minutes   - Encourage patient to express feelings, fears, frustrations, hopes   Outcome: Progressing  Goal: Refrain from harming self  Description: Interventions:  - Monitor patient closely, per order   - Supervise medication ingestion, monitor effects and side effects   Outcome: Progressing  Goal: Refrain from isolation  Description: Interventions:  - Develop a trusting relationship   - Encourage socialization   Outcome: Progressing  Goal: Refrain from self-neglect  Outcome: Progressing  Goal: Attend and participate in unit activities, including therapeutic, recreational, and educational groups  Description: Interventions:  - Provide therapeutic and educational activities daily, encourage attendance and participation, and document same in the medical record   Outcome: Progressing  Goal: Complete daily ADLs, including personal hygiene independently, as able  Description: Interventions:  - Observe, teach, and assist patient with ADLS  -  Monitor and promote a balance of rest/activity, with adequate nutrition and elimination   Outcome: Progressing     Problem: Anxiety  Goal: Anxiety is at manageable level  Description: Interventions:  - Assess and monitor patient's anxiety level  - Monitor for signs and symptoms (heart palpitations, chest pain, shortness of breath, headaches, nausea, feeling jumpy, restlessness, irritable, apprehensive)  - Collaborate with interdisciplinary team and initiate plan and interventions as ordered    - [place] patient to unit/surroundings  - Explain treatment plan  - Encourage participation in care  - Encourage verbalization of concerns/fears  - Identify coping mechanisms  - Assist in developing anxiety-reducing skills  - Administer/offer alternative therapies  - Limit or eliminate stimulants  Outcome: Progressing

## 2021-04-17 NOTE — NURSING NOTE
Patient visible on the unit  She reported during med pass time she is not hearing any voices   Yelling at times in the room   Then she calms down by self  Denies depression/anxiety/suicidal ideation  Med and meal compliant  Will continue to monitor

## 2021-04-17 NOTE — NURSING NOTE
Patient yelling again at the group  Currently she is in her room  She dont want to take any medicine   Reported she is not hearing any voices   States '' leave me alone ''   Appears to be behavioral hyper tantrum   Patient calm and quiet now  Will continue to monitor

## 2021-04-17 NOTE — NURSING NOTE
Requested to speak with this writer  "I need a prn for psychosis  I am hearing voices"  Zyprexa 2 5mg po given per request  Sitting in dining room watching TV at this time

## 2021-04-17 NOTE — NURSING NOTE
Charlie Rivera has not had any issues or behaviors  Appeared to be asleep throughout the night  Respirations easy and non labored  Woke once to check the time and use the bathroom  Continue to monitor  Precautions maintained

## 2021-04-17 NOTE — PROGRESS NOTES
04/16/21 1400   Activity/Group Checklist   Group Other (Comment)  (Group Art Therapy/Open Choice, Open Discussion)   Attendance Attended   Attendance Duration (min) Greater than 60   Interactions Interacted appropriately   Affect/Mood Appropriate  (Able to manage mood swings)   Goals Achieved Able to listen to others; Able to engage in interactions; Able to recieve feedback; Able to give feedback to another  (Able to engage materials; full participation)

## 2021-04-18 PROCEDURE — 99233 SBSQ HOSP IP/OBS HIGH 50: CPT | Performed by: NURSE PRACTITIONER

## 2021-04-18 RX ORDER — OLANZAPINE 10 MG/1
10 TABLET ORAL 2 TIMES DAILY
Status: DISCONTINUED | OUTPATIENT
Start: 2021-04-18 | End: 2021-04-23

## 2021-04-18 RX ORDER — OLANZAPINE 2.5 MG/1
2.5 TABLET ORAL ONCE
Status: COMPLETED | OUTPATIENT
Start: 2021-04-18 | End: 2021-04-18

## 2021-04-18 RX ADMIN — ACETAMINOPHEN 975 MG: 325 TABLET ORAL at 11:30

## 2021-04-18 RX ADMIN — GABAPENTIN 600 MG: 300 CAPSULE ORAL at 12:14

## 2021-04-18 RX ADMIN — LORAZEPAM 0.5 MG: 0.5 TABLET ORAL at 21:29

## 2021-04-18 RX ADMIN — FENOFIBRATE 145 MG: 145 TABLET ORAL at 08:05

## 2021-04-18 RX ADMIN — GABAPENTIN 600 MG: 300 CAPSULE ORAL at 21:29

## 2021-04-18 RX ADMIN — HYDROXYZINE HYDROCHLORIDE 25 MG: 25 TABLET, FILM COATED ORAL at 05:27

## 2021-04-18 RX ADMIN — GABAPENTIN 600 MG: 300 CAPSULE ORAL at 08:05

## 2021-04-18 RX ADMIN — OLANZAPINE 2.5 MG: 2.5 TABLET, FILM COATED ORAL at 10:30

## 2021-04-18 RX ADMIN — LIDOCAINE HYDROCHLORIDE 10 ML: 20 SOLUTION ORAL; TOPICAL at 15:47

## 2021-04-18 RX ADMIN — LORAZEPAM 0.5 MG: 0.5 TABLET ORAL at 17:01

## 2021-04-18 RX ADMIN — NICOTINE 1 PATCH: 21 PATCH, EXTENDED RELEASE TRANSDERMAL at 08:05

## 2021-04-18 RX ADMIN — OLANZAPINE 7.5 MG: 2.5 TABLET, FILM COATED ORAL at 08:05

## 2021-04-18 RX ADMIN — DIPHENHYDRAMINE HCL 25 MG: 25 TABLET ORAL at 21:29

## 2021-04-18 RX ADMIN — LORAZEPAM 0.5 MG: 0.5 TABLET ORAL at 08:05

## 2021-04-18 RX ADMIN — LORAZEPAM 0.5 MG: 0.5 TABLET ORAL at 12:15

## 2021-04-18 RX ADMIN — GABAPENTIN 600 MG: 300 CAPSULE ORAL at 17:02

## 2021-04-18 RX ADMIN — BENZTROPINE MESYLATE 1 MG: 1 TABLET ORAL at 21:29

## 2021-04-18 RX ADMIN — LIDOCAINE 1 PATCH: 50 PATCH CUTANEOUS at 08:04

## 2021-04-18 RX ADMIN — ESCITALOPRAM OXALATE 20 MG: 10 TABLET ORAL at 08:05

## 2021-04-18 RX ADMIN — OLANZAPINE 10 MG: 10 TABLET, FILM COATED ORAL at 17:02

## 2021-04-18 RX ADMIN — ALBUTEROL SULFATE 2 PUFF: 90 AEROSOL, METERED RESPIRATORY (INHALATION) at 06:22

## 2021-04-18 NOTE — NURSING NOTE
Alert, currently cooperative and visible intermittently  Complaint of R lower molar tooth pain a #10 on a scale of 0-10  Results pending  Consumed 100% of dinner  Took all medication without prompting  Maintained on safe precautions without incident

## 2021-04-18 NOTE — NURSING NOTE
Tamika Marrero maintained on ongoing SAFE precaution without incident on this shift   Laying in bed with eyes closed, breath even and unlabored   Q 7 minutes rounding implemented continuously   Woke up at [phone], look at the clock, 0450 ask for juice, 0500 request PRN Zyprexa because she had a nightmare  Ask her if she was wearing her nicotine patch, she said no and double check  She was wearing her nicotine patch  Patch was removed and she returned back to bed  Unable to forget her nightmare PRN Atarax 25mg rendered @ 0529 for 7 on the [facility] Scale  She is somatic this morning with various complaints, c/o shortness of breath R18, PRN Ventolin 2 puff rendered  Explained to her that her INH can cause her to have elevated HR which may contradict the atarax for anxiety  Advise her to lay down or meditate to keep her HR at a stable level  From 8419 to [address] [facility] had 4 cups of water and juice a total of 1920, and 3 trips to the bathroom   Will continue to monitor

## 2021-04-18 NOTE — NURSING NOTE
Prn tylenol 975mg given as ordered for 9/10 upper and lower right side mouth/tooth pain  SLIM notified  Will continue to monitor

## 2021-04-18 NOTE — PROGRESS NOTES
Progress Note - [address] 53 y o  female MRN: [id number]   Unit/Bed#: [facility] 109-01 Encounter: [id number]    Behavior over the last 24 hours: unchanged  Susie Wayne was seen today for continuation of care, records reviewed and patient was discussed with nursing team   Per report, patient has been eating well, sleeping off and on, has been attending groups, has been interacting with select peers on the unit  The patient has reported concerns overnight which include nightmares  Susie Wayne also has been screaming out frequently per staff report  Today, she is anxious, slightly irritable, somewhat tearful upon approach, good eye contact, has loud at times and slightly pressured speech and a somewhat illogical and concrete thought process  She reports no auditory hallucinations, no visual hallucinations, denies suicidal ideation, denies homicidal ideation, and denies delusional thinking  It is important to note that Susie Wayne states she is screaming out because "someone is touching me and I do not like it I yell at them that I hate them I can even smell them, I feel like I am going slowly crazy    We discussed this further she reports that she has olfactory and tactile hallucinations of someone touching her private parts    She states this is why she is yelling out that she hates them in hopes for them to go away  She is agreeable to increased dose of Zyprexa to 10 mg p o  B i d   One time dose of 2 5 mg given this a m  To make a m  Dose this morning 10 mg   (of note this provider did adjust p r n  Zyprexa dosing to a maximum of 10 mg per day for all doses so maximum total daily dosing for Zyprexa will now be 30 mg with schedule dose and p r n  Dose)  Susie Wayne is also reporting right lower mouth molar pain, this provider placed a dental consult    She is also reporting nightmares however she feels this may be from the nicotine patch, we did put new orders into remove in the evening and be placed again in the morning in hopes this will alleviate nightmares  Medication side effects: Yes - Nightmares possibly secondary to nicotine patch, adjusted dosing for patch to be removed at approximately 7:00 p m  To see if this alleviates nightmares   ROS: reports dental pain, all other systems are negative    Mental Status Evaluation:    Appearance:  casually dressed, dressed appropriately, adequate grooming   Behavior:  cooperative, slightly agitated, good eye contact   Speech:  loud, slightly pressured   Mood:  labile   Affect:  tearful, labile   Thought Process:  concrete, Somewhat illogical   Associations: concrete associations   Thought Content:  paranoid delusions   Perceptual Disturbances: denies auditory or visual hallucinations when asked, but appears distracted, talks to self at times, olfactory hallucinations, tactile hallucinations, appears responding to internal stimuli   Risk Potential: Suicidal ideation - None  Homicidal ideation - None  Potential for aggression - Not at present   Sensorium:  oriented to person, place and time/date   Memory:  recent memory intact, remote memory intact   Consciousness:  alert and awake   Attention: attention span and concentration appear shorter than expected for age   Insight:  poor   Judgment: limited   Gait/Station: normal gait/station, normal balance   Motor Activity: no abnormal movements     Vital signs in last 24 hours:    Temp:  [97 3 °F (36 3 °C)-98 3 °F (36 8 °C)] 97 3 °F (36 3 °C)  HR:  [83-95] 95  Resp:  [15-16] 16  BP: (113-120)/(67-72) 115/67    Laboratory results:  I have personally reviewed all pertinent laboratory/tests results    No new labs review today    Suicide/Homicide Risk Assessment:    Risk of Harm to Self:   Nursing Suicide Risk Assessment Last 24 hours: C-SSRS Risk (Since Last Contact)  Calculated C-SSRS Risk Score (Since Last Contact): No Risk Indicated    Risk of Harm to Others:  Nursing Homicide Risk Assessment: Violence Risk to Others: Denies within past 6 months    The following interventions are recommended: continued hospitalization on locked unit, Behavioral checks every 15 minutes    Progress Toward Goals: no improvement, remains labile    Assessment/Plan   Principal Problem:    Schizoaffective disorder, bipolar type (HCC)  Active Problems:    Post-traumatic stress disorder, chronic    Medical clearance for psychiatric admission    Mild intermittent asthma without complication    Ear problem, bilateral    Recommended Treatment:     Planned medication and treatment changes: All current active medications have been reviewed  Encourage group therapy, milieu therapy and occupational therapy  Behavioral Health checks every 15 minutes   -dental consult placed for complaints of right-sided lower molar pain  -orders placed to discontinue nicotine patch at approximately 7:00 p m  Due to this potentially causing nightmares and to be placed on again in a m   -continue prazosin P q h s  for improvement in nightmares  -continue lorazepam 0 5 mg p o  Q i d   -continue gabapentin 600 mg p o  Q i d   -continue Lexapro 20 mg p o  Daily  -continue Cogentin 1 mg p o  Q h s   -Increase olanzapine from 7 5 mg p o  B i d  To 10 mg p o  B i d  (of note this provider did adjust p r n  Dosing to maximum of 10 mg p o   Daily so patient will not have more than maximum of 30 mg of Zyprexa daily)  -discharge planning pending mood stabilization  Continue all other medications:    Current Facility-Administered Medications   Medication Dose Route Frequency Provider Last Rate    acetaminophen  650 mg Oral Q6H PRN Isis N Lodics, CRNP      acetaminophen  650 mg Oral Q4H PRN David Cancel Lodics, CRNP      acetaminophen  975 mg Oral Q6H PRN David Cancel Lodics, CRNP      albuterol  2 puff Inhalation Q6H PRN Summers Cancel Lodics, CRNP      benztropine  1 mg Intramuscular Q4H PRN Max 6/day Isis N Lodics, CRNP      benztropine  1 mg Oral Q4H PRN Max 6/day David Cancel Lodics, CRNP  benztropine  1 mg Oral HS Isis Lama, CRNP      calcium carbonate  500 mg Oral TID PRN Sara Lama, CRNP      diphenhydrAMINE  25 mg Oral HS Isis Lama, CRNP      escitalopram  20 mg Oral Daily Isis Lama, CRNP      fenofibrate  145 mg Oral Daily Isis Lama, CRNP      gabapentin  600 mg Oral 4x Daily Isis Lama, CRNP      haloperidol  5 mg Oral Q6H PRN Sara Lama, CRNP      hydrOXYzine HCL  25 mg Oral Q6H PRN Max 4/day Isis Lama, CRNP      hydrOXYzine HCL  50 mg Oral Q6H PRN Max 4/day Isis Lama, CRNP      lidocaine  1 patch Topical Daily Isis Lama, CRNP      LORazepam  1 mg Intramuscular Q6H PRN Arcenio Bañuelos MD      LORazepam  0 5 mg Oral 4x Daily Arcenio Bañuelos MD      magnesium hydroxide  30 mL Oral Daily PRN Sara Lama, CRNP      nicotine  1 patch Transdermal Daily Skip Hoe, CRNP      OLANZapine  2 5 mg Oral Q8H PRN Skip Hoe, CRNP      OLANZapine  5 mg Oral Q3H PRN Skip Hoe, CRNP      OLANZapine  7 5 mg Oral Q3H PRN Skip Hoe, CRNP      OLANZapine  10 mg Intramuscular Q3H PRN Skip Hoe, CRNP      OLANZapine  5 mg Intramuscular Q3H PRN Skip Hoe, CRNP      OLANZapine  10 mg Oral BID Skip Hoe, CRNP      Pramox-PE-Glycerin-Petrolatum  1 application Rectal 4x Daily PRN Isis Lama, CRNP      prazosin  1 mg Oral HS Skip Hoe, CRNP         Risks / Benefits of Treatment:    Risks, benefits, and possible side effects of medications explained to patient and patient verbalizes understanding and agreement for treatment  Risks of medications in pregnancy explained if female patient  Patient verbalizes understanding and agrees to notify her doctor if she becomes pregnant  Counseling / Coordination of Care:    Patient's progress reviewed with nursing staff  Medications, treatment progress and treatment plan reviewed with patient      Supportive counseling provided to the patient      This note was not shared with the patient due to reasonable likelihood of causing patient harm

## 2021-04-18 NOTE — PROGRESS NOTES
04/17/21 1300   Activity/Group Checklist   Group Other (Comment)  (OPEN STUDIO/Art Therapy, Social Interaction-Free Expression)   Attendance Attended   Attendance Duration (min) 31-45  (Patient left group early due top outburst)   Interactions Disorganized interaction  (Labile; reacting to voices)   Affect/Mood Angry; Constricted

## 2021-04-18 NOTE — NURSING NOTE
Intermittently visible in the milieu, did not attend any groups  Ate 100% of dinner  Came for evening meds but was difficult to awaken for HS meds  Did take the HS meds when brought to her  Labile, irritable, paranoid, accusatory towards staff, yelling and screaming for the first half of the shift  Calmed down for the second half of the shift, went to bed after dinner  No other behaviors or issues noted  Continue to monitor

## 2021-04-18 NOTE — NURSING NOTE
Pt is medication and meal compliant  Pt is visible in the milieu and frequents the nurses station for requests and reports of pain and anxiety to which all requests and reports of s/s were addressed at this time and reported to [facility]  Pt at times yelling out, but now states " I feel someone touching me and I want them to go away"  Pt anxious, preoccupied with dental pain, which is being addressed and with pressured speech and tearful in conversation at times  Pt keeps to self, but will sit with others in milieu and at groups  Since the morning pt has remained in control of yelling out and cursing  Will continue to monitor

## 2021-04-18 NOTE — PLAN OF CARE
Problem: Alteration in Thoughts and Perception  Goal: Agree to be compliant with medication regime, as prescribed and report medication side effects  Description: Interventions:  - Offer appropriate PRN medication and supervise ingestion; conduct AIMS, as needed   Outcome: Progressing     Problem: Ineffective Coping  Goal: Participates in unit activities  Description: Interventions:  - Provide therapeutic environment   - Provide required programming   - Redirect inappropriate behaviors   Outcome: Progressing

## 2021-04-19 PROCEDURE — 99232 SBSQ HOSP IP/OBS MODERATE 35: CPT | Performed by: PSYCHIATRY & NEUROLOGY

## 2021-04-19 RX ADMIN — ESCITALOPRAM OXALATE 20 MG: 10 TABLET ORAL at 08:38

## 2021-04-19 RX ADMIN — HYDROXYZINE HYDROCHLORIDE 25 MG: 25 TABLET, FILM COATED ORAL at 06:12

## 2021-04-19 RX ADMIN — BENZTROPINE MESYLATE 1 MG: 1 TABLET ORAL at 21:13

## 2021-04-19 RX ADMIN — LORAZEPAM 0.5 MG: 0.5 TABLET ORAL at 17:00

## 2021-04-19 RX ADMIN — LORAZEPAM 0.5 MG: 0.5 TABLET ORAL at 08:39

## 2021-04-19 RX ADMIN — FENOFIBRATE 145 MG: 145 TABLET ORAL at 08:38

## 2021-04-19 RX ADMIN — NICOTINE 1 PATCH: 21 PATCH, EXTENDED RELEASE TRANSDERMAL at 08:40

## 2021-04-19 RX ADMIN — BENZTROPINE MESYLATE 1 MG: 1 TABLET ORAL at 11:38

## 2021-04-19 RX ADMIN — DIPHENHYDRAMINE HCL 25 MG: 25 TABLET ORAL at 21:13

## 2021-04-19 RX ADMIN — GABAPENTIN 600 MG: 300 CAPSULE ORAL at 08:38

## 2021-04-19 RX ADMIN — LORAZEPAM 0.5 MG: 0.5 TABLET ORAL at 11:38

## 2021-04-19 RX ADMIN — LIDOCAINE 1 PATCH: 50 PATCH CUTANEOUS at 08:39

## 2021-04-19 RX ADMIN — MAGNESIUM HYDROXIDE 30 ML: 400 SUSPENSION ORAL at 15:46

## 2021-04-19 RX ADMIN — LIDOCAINE HYDROCHLORIDE 10 ML: 20 SOLUTION ORAL; TOPICAL at 09:21

## 2021-04-19 RX ADMIN — GABAPENTIN 600 MG: 300 CAPSULE ORAL at 21:13

## 2021-04-19 RX ADMIN — PHENYTOIN 1 MG: 125 SUSPENSION ORAL at 21:12

## 2021-04-19 RX ADMIN — OLANZAPINE 10 MG: 10 TABLET, FILM COATED ORAL at 08:38

## 2021-04-19 RX ADMIN — GABAPENTIN 600 MG: 300 CAPSULE ORAL at 17:00

## 2021-04-19 RX ADMIN — GABAPENTIN 600 MG: 300 CAPSULE ORAL at 11:38

## 2021-04-19 RX ADMIN — OLANZAPINE 10 MG: 10 TABLET, FILM COATED ORAL at 17:00

## 2021-04-19 RX ADMIN — LORAZEPAM 0.5 MG: 0.5 TABLET ORAL at 21:12

## 2021-04-19 NOTE — PROGRESS NOTES
Progress Note - Behavioral Health     Dois Fred 53 y o  female MRN: [id number]   Unit/Bed#: [facility] 522-39 Encounter: [id number]      Subjective:  Patient's chart reviewed and case discussed with the nurse  The patient was seen in the milieu today  She was complaining about toothache in the morning  The patient reports at this time it is much better and rated at 0 when asked to scale her pain  She denies any SI or HI  Reports mood has been okay  Denies any depression or anxiety  Reports occasional auditory hallucination though denies commanding in nature  Reports able to ignore it denies any visual hallucination  Anxiety has been better  Reports still struggling with nightmares at night  Compliant with the medication and denies any side effect  As per the nurse the patient has been overall doing okay other than complained of nightmare last night  No behavior issues  Her Minipress was also held due to blood pressure being low          ROS: all other systems are negative    Mental Status Evaluation:    Appearance:  casually dressed   Behavior:  normal   Speech:  normal rate, normal volume, normal pitch   Mood:  normal   Affect:  constricted   Thought Process:  organized   Associations: intact associations   Thought Content:  normal   Perceptual Disturbances: vague auditory hallucinations without commands   Risk Potential: Suicidal ideation - None  Homicidal ideation - None  Potential for aggression - No   Sensorium:  oriented to person, place and time/date   Memory:  recent and remote memory grossly intact   Consciousness:  alert and awake   Attention: attention span and concentration are age appropriate   Insight:  age appropriate   Judgment: age appropriate   Gait/Station: normal gait/station   Motor Activity: no abnormal movements     Vital signs in last 24 hours:    Temp:  [97 3 °F (36 3 °C)-97 8 °F (36 6 °C)] 97 3 °F (36 3 °C)  HR:  [92-93] 92  Resp:  [16-18] 16  BP: (102-112)/(59-74) 112/74    Laboratory results: I have personally reviewed all pertinent laboratory/tests results  Progress Toward Goals: improving    Assessment/Plan   Principal Problem:    Schizoaffective disorder, bipolar type (HCC)  Active Problems:    Post-traumatic stress disorder, chronic    Medical clearance for psychiatric admission    Mild intermittent asthma without complication    Ear problem, bilateral    Recommended Treatment:     Planned medication and treatment changes:     All current active medications have been reviewed  Encourage group therapy, milieu therapy and occupational therapy  Behavioral Health checks every 7 minutes  Continue current medications:  Current Facility-Administered Medications   Medication Dose Route Frequency Provider Last Rate    acetaminophen  650 mg Oral Q6H PRN [facility], CRNP      acetaminophen  650 mg Oral Q4H PRN [facility], CRNP      acetaminophen  975 mg Oral Q6H PRN Isis Lama, CRNP      al mag oxide-diphenhydramine-lidocaine viscous  10 mL Swish & Swallow Q4H PRN Lesile Velazquez, CRNP      albuterol  2 puff Inhalation Q6H PRN Álvaro Kamiahradha Porterics, CRNP      benztropine  1 mg Intramuscular Q4H PRN Max 6/day Isis Lama, CRNP      benztropine  1 mg Oral Q4H PRN Max 6/day Isis Lama, CRNP      benztropine  1 mg Oral HS Isis Lama, CRNP      calcium carbonate  500 mg Oral TID PRN Álvaro Kamiahradha Lama, CRNP      diphenhydrAMINE  25 mg Oral HS Isis Lama, CRNP      escitalopram  20 mg Oral Daily Isis Lama, CRNP      fenofibrate  145 mg Oral Daily Isis Lama, CRNP      gabapentin  600 mg Oral 4x Daily Isis Lama, CRNP      haloperidol  5 mg Oral Q6H PRN Isis Lama, CRNP      hydrOXYzine HCL  25 mg Oral Q6H PRN Max 4/day Isis Lama, CRNP      hydrOXYzine HCL  50 mg Oral Q6H PRN Max 4/day Isis Lama, CRNP      lidocaine  1 patch Topical Daily Isis Lama, CRNP      LORazepam  1 mg Intramuscular Q6H PRN Lorna Roe MD      LORazepam  0 5 mg Oral 4x Daily Lorna Roe MD      magnesium hydroxide  30 mL Oral Daily PRN [facility], CRNP      nicotine  1 patch Transdermal Daily Wing Fields, CRNP      OLANZapine  2 5 mg Oral Q8H PRN Wing Fields, CRNP      OLANZapine  5 mg Oral Q3H PRN Wing Fields, CRNP      OLANZapine  7 5 mg Oral Q3H PRN Wing Fields, CRNP      OLANZapine  10 mg Intramuscular Q3H PRN Wing Fields, CRNP      OLANZapine  5 mg Intramuscular Q3H PRN Wing Fields, CRNP      OLANZapine  10 mg Oral BID Wing Fields, CRNP      Pramox-PE-Glycerin-Petrolatum  1 application Rectal 4x Daily PRN Isis Lama, RENATANP      prazosin  1 mg Oral HS Wing Fields, RENATANP         Risks / Benefits of Treatment:    Risks, benefits, and possible side effects of medications explained to patient and patient verbalizes understanding and agreement for treatment  Counseling / Coordination of Care:    Patient's progress discussed with staff in treatment team meeting  Medications, treatment progress and treatment plan reviewed with patient      Pranav Fraser MD 04/19/21

## 2021-04-19 NOTE — PROGRESS NOTES
04/19/21 1119   Team Meeting   Meeting Type Daily Rounds   Initial Conference Date 04/19/21   Patient/Family Present   Patient Present No   Patient's Family Present No       Daily Rounds Documentation  Team Members Participating  NEYDA Suh, [facility]  Glenn Cintron, [facility]  Dionicio Suggs, ANNALISE    Case reviewed  Reported tooth pain, SLIM to consult  Also reported having nightmares  No outbursts noted this weekend, continues with irritable edge, labile, and reactive

## 2021-04-19 NOTE — PLAN OF CARE
Problem: Nutrition/Hydration-ADULT  Goal: Nutrient/Hydration intake appropriate for improving, restoring or maintaining nutritional needs  Description: Monitor and assess patient's nutrition/hydration status for malnutrition  Collaborate with interdisciplinary team and initiate plan and interventions as ordered  Monitor patient's weight and dietary intake as ordered or per policy  Utilize nutrition screening tool and intervene as necessary  Determine patient's food preferences and provide high-protein, high-caloric foods as appropriate       INTERVENTIONS:  - Monitor oral intake, urinary output, labs, and treatment plans  - Assess nutrition and hydration status and recommend course of action  - Evaluate amount of meals eaten  - Assist patient with eating if necessary   - Allow adequate time for meals  - Recommend/ encourage appropriate diets, oral nutritional supplements, and vitamin/mineral supplements  - Order, calculate, and assess calorie counts as needed  - Recommend, monitor, and adjust tube feedings and TPN/PPN based on assessed needs  - Assess need for intravenous fluids  - Provide specific nutrition/hydration education as appropriate  - Include patient/family/caregiver in decisions related to nutrition  4/19/2021 1159 by Steve Bob LPN  Outcome: Not Progressing  4/19/2021 1158 by Steve Bob LPN  Outcome: Not Progressing

## 2021-04-19 NOTE — PROGRESS NOTES
04/19/21 0700   Activity/Group Checklist   Group   (Coffee Talk )   Attendance Attended   Attendance Duration (min) 46-60   Interactions Did not interact   Affect/Mood Appropriate;Calm;Normal range   Goals Achieved Able to listen to others

## 2021-04-19 NOTE — PLAN OF CARE
Problem: Ineffective Coping  Goal: Participates in unit activities  Description: Interventions:  - Provide therapeutic environment   - Provide required programming   - Redirect inappropriate behaviors   Outcome: Progressing   Patient completed Goal Card for the week of 4/19/21  Goals:  At least 50% of groups daily              Try to control episodes              Continue to focus on self

## 2021-04-19 NOTE — NURSING NOTE
Patient requested/received MOM for "constipation" at 32 61 16  She also requested to have "fiber" or "Miralax" daily   Writer said she will endorse this via nursing communication Her behavior was in control and she did not require any redirection  She went to sleep three hours after dinner and had to be awakened for HS medications and she went right back to sleep

## 2021-04-19 NOTE — NURSING NOTE
Patient is compliant with medication and meals  She is not yelling  Today as she was yelling last week  She still remains very delusional  At times   She thinks staff is going to hypnotize her so she can not remember things

## 2021-04-19 NOTE — PROGRESS NOTES
04/19/21 0900   Activity/Group Checklist   Group Community meeting   Attendance Attended   Attendance Duration (min) 16-30   Interactions Interacted appropriately   Affect/Mood Appropriate;Bright;Normal range   Goals Achieved Identified feelings; Able to listen to others; Able to engage in interactions

## 2021-04-19 NOTE — PROGRESS NOTES
04/19/21 1100   Activity/Group Checklist   Group   (IMR)   Attendance Attended   Attendance Duration (min) 46-60   Interactions Interacted appropriately   Affect/Mood Appropriate;Calm;Normal range   Goals Achieved Identified feelings; Able to listen to others; Able to engage in interactions

## 2021-04-19 NOTE — NURSING NOTE
Harris appears to be napping  this evening   She didn't attend therapeutic group this evening   She took night medication   Held minipress due to SBP Parameters (102 /59 )   Will continue to monitor

## 2021-04-19 NOTE — NURSING NOTE
Lucas León maintained on ongoing SAFE precaution without incident on this shift   Laying in bed with eyes closed, breath even and unlabored  Continue to c/o of having nightmare  She was wearing the nicotine patch, patch remove   Q 7 minutes rounding implemented continuously   No behavioral noted   Will continue to monitor

## 2021-04-19 NOTE — PLAN OF CARE
Problem: Alteration in Thoughts and Perception  Goal: Treatment Goal: Gain control of psychotic behaviors/thinking, reduce/eliminate presenting symptoms and demonstrate improved reality functioning upon discharge  Outcome: Progressing  Goal: Verbalize thoughts and feelings  Description: Interventions:  - Promote a nonjudgmental and trusting relationship with the patient through active listening and therapeutic communication  - Assess patient's level of functioning, behavior and potential for risk  - Engage patient in 1 on 1 interactions  - Encourage patient to express fears, feelings, frustrations, and discuss symptoms    - [place] patient to reality, help patient recognize reality-based thinking   - Administer medications as ordered and assess for potential side effects  - Provide the patient education related to the signs and symptoms of the illness and desired effects of prescribed medications  Outcome: Progressing  Goal: Refrain from acting on delusional thinking/internal stimuli  Description: Interventions:  - Monitor patient closely, per order   - Utilize least restrictive measures   - Set reasonable limits, give positive feedback for acceptable   - Administer medications as ordered and monitor of potential side effects  Outcome: Progressing  Goal: Agree to be compliant with medication regime, as prescribed and report medication side effects  Description: Interventions:  - Offer appropriate PRN medication and supervise ingestion; conduct AIMS, as needed   Outcome: Progressing  Goal: Attend and participate in unit activities, including therapeutic, recreational, and educational groups  Description: Interventions:  -Encourage Visitation and family involvement in care  Outcome: Progressing  Goal: Recognize dysfunctional thoughts, communicate reality-based thoughts at the time of discharge  Description: Interventions:  - Provide medication and psycho-education to assist patient in compliance and developing insight into his/her illness   Outcome: Progressing  Goal: Complete daily ADLs, including personal hygiene independently, as able  Description: Interventions:  - Observe, teach, and assist patient with ADLS  - Monitor and promote a balance of rest/activity, with adequate nutrition and elimination   Outcome: Progressing     Problem: Ineffective Coping  Goal: Cooperates with admission process  Description: Interventions:   - Complete admission process  Outcome: Progressing  Goal: Identifies ineffective coping skills  Outcome: Not Progressing  Goal: Identifies healthy coping skills  Outcome: Progressing  Goal: Demonstrates healthy coping skills  Outcome: Progressing  Goal: Participates in unit activities  Description: Interventions:  - Provide therapeutic environment   - Provide required programming   - Redirect inappropriate behaviors   Outcome: Progressing  Goal: Patient/Family participate in treatment and DC plans  Description: Interventions:  - Provide therapeutic environment  Outcome: Progressing  Goal: Patient/Family verbalizes awareness of resources  Outcome: Progressing  Goal: Understands least restrictive measures  Description: Interventions:  - Utilize least restrictive behavior  Outcome: Progressing  Goal: Free from restraint events  Description: - Utilize least restrictive measures   - Provide behavioral interventions   - Redirect inappropriate behaviors   Outcome: Progressing     Problem: Risk for Self Injury/Neglect  Goal: Treatment Goal: Remain safe during length of stay, learn and adopt new coping skills, and be free of self-injurious ideation, impulses and acts at the time of discharge  Outcome: Progressing  Goal: Verbalize thoughts and feelings  Description: Interventions:  - Assess and re-assess patient's lethality and potential for self-injury  - Engage patient in 1:1 interactions, daily, for a minimum of 15 minutes  - Encourage patient to express feelings, fears, frustrations, hopes  - Establish rapport/trust with patient   Outcome: Progressing  Goal: Refrain from harming self  Description: Interventions:  - Monitor patient closely, per order  - Develop a trusting relationship  - Supervise medication ingestion, monitor effects and side effects   Outcome: Progressing  Goal: Attend and participate in unit activities, including therapeutic, recreational, and educational groups  Description: Interventions:  - Provide therapeutic and educational activities daily, encourage attendance and participation, and document same in the medical record  - Obtain collateral information, encourage visitation and family involvement in care   Outcome: Progressing  Goal: Recognize maladaptive responses and adopt new coping mechanisms  Outcome: Not Progressing  Goal: Complete daily ADLs, including personal hygiene independently, as able  Description: Interventions:  - Observe, teach, and assist patient with ADLS  - Monitor and promote a balance of rest/activity, with adequate nutrition and elimination  Outcome: Progressing     Problem: Depression  Goal: Treatment Goal: Demonstrate behavioral control of depressive symptoms, verbalize feelings of improved mood/affect, and adopt new coping skills prior to discharge  Outcome: Progressing  Goal: Verbalize thoughts and feelings  Description: Interventions:  - Assess and re-assess patient's level of risk   - Engage patient in 1:1 interactions, daily, for a minimum of 15 minutes   - Encourage patient to express feelings, fears, frustrations, hopes   Outcome: Progressing  Goal: Refrain from harming self  Description: Interventions:  - Monitor patient closely, per order   - Supervise medication ingestion, monitor effects and side effects   Outcome: Progressing  Goal: Refrain from isolation  Description: Interventions:  - Develop a trusting relationship   - Encourage socialization   Outcome: Progressing  Goal: Refrain from self-neglect  Outcome: Progressing  Goal: Attend and participate in unit activities, including therapeutic, recreational, and educational groups  Description: Interventions:  - Provide therapeutic and educational activities daily, encourage attendance and participation, and document same in the medical record   Outcome: Progressing  Goal: Complete daily ADLs, including personal hygiene independently, as able  Description: Interventions:  - Observe, teach, and assist patient with ADLS  -  Monitor and promote a balance of rest/activity, with adequate nutrition and elimination   Outcome: Progressing     Problem: Anxiety  Goal: Anxiety is at manageable level  Description: Interventions:  - Assess and monitor patient's anxiety level  - Monitor for signs and symptoms (heart palpitations, chest pain, shortness of breath, headaches, nausea, feeling jumpy, restlessness, irritable, apprehensive)  - Collaborate with interdisciplinary team and initiate plan and interventions as ordered    - [place] patient to unit/surroundings  - Explain treatment plan  - Encourage participation in care  - Encourage verbalization of concerns/fears  - Identify coping mechanisms  - Assist in developing anxiety-reducing skills  - Administer/offer alternative therapies  - Limit or eliminate stimulants  Outcome: Progressing     Problem: Risk for Violence/Aggression Toward Others  Goal: Treatment Goal: Refrain from acts of violence/aggression during length of stay, and demonstrate improved impulse control at the time of discharge  Outcome: Progressing  Goal: Verbalize thoughts and feelings  Description: Interventions:  - Assess and re-assess patient's level of risk, every waking shift  - Engage patient in 1:1 interactions, daily, for a minimum of 15 minutes   - Allow patient to express feelings and frustrations in a safe and non-threatening manner   - Establish rapport/trust with patient   Outcome: Progressing  Goal: Refrain from harming others  Outcome: Progressing  Goal: Refrain from destructive acts on the environment or property  Outcome: Progressing  Goal: Control angry outbursts  Description: Interventions:  - Monitor patient closely, per order  - Ensure early verbal de-escalation  - Monitor prn medication needs  - Set reasonable/therapeutic limits, outline behavioral expectations, and consequences   - Provide a non-threatening milieu, utilizing the least restrictive interventions   Outcome: Progressing  Goal: Attend and participate in unit activities, including therapeutic, recreational, and educational groups  Description: Interventions:  - Provide therapeutic and educational activities daily, encourage attendance and participation, and document same in the medical record   Outcome: Progressing  Goal: Identify appropriate positive anger management techniques  Description: Interventions:  - Offer anger management and coping skills groups   - Staff will provide positive feedback for appropriate anger control  Outcome: Not Progressing     Problem: Alteration in Orientation  Goal: Treatment Goal: Demonstrate a reduction of confusion and improved orientation to person, place, time and/or situation upon discharge, according to optimum baseline/ability  Outcome: Progressing  Goal: Interact with staff daily  Description: Interventions:  - Assess and re-assess patient's level of orientation  - Engage patient in 1 on 1 interactions, daily, for a minimum of 15 minutes   - Establish rapport/trust with patient   Outcome: Progressing  Goal: Express concerns related to confused thinking related to:  Description: Interventions:  - Encourage patient to express feelings, fears, frustrations, hopes  - Assign consistent caregivers   - [place]/re-orient patient as needed  - Allow comfort items, as appropriate  - Provide visual cues, signs, etc    Outcome: Progressing  Goal: Allow medical examinations, as recommended  Description: Interventions:  - Provide physical/neurological exams and/or referrals, per provider   Outcome: Progressing  Goal: Cooperate with recommended testing/procedures  Description: Interventions:  - Determine need for ancillary testing  - Observe for mental status changes  - Implement falls/precaution protocol   Outcome: Progressing  Goal: Attend and participate in unit activities, including therapeutic, recreational, and educational groups  Description: Interventions:  - Provide therapeutic and educational activities daily, encourage attendance and participation, and document same in the medical record   - Provide appropriate opportunities for reminiscence   - Provide a consistent daily routine   - Encourage family contact/visitation   Outcome: Progressing  Goal: Complete daily ADLs, including personal hygiene independently, as able  Description: Interventions:  - Observe, teach, and assist patient with ADLS  Outcome: Progressing     Problem: Individualized Interventions  Goal: Patient will verbalize appropriate use of telephone within 5 days  Description: Interventions:  - Treatment team to determine use of supervised phone privileges   Outcome: Progressing  Goal: Patient will verbalize need for hospitalization and will no longer attempt elopement within 5 days  Description: Interventions:  - Ongoing education to help patient understand need for hospitalization  Outcome: Progressing  Goal: Patient will recognize inappropriate behaviors and develop alternative behaviors within 5 days  Description: Interventions:  - Patient in collaboration with Treatment Team will develop a behavior management plan to help identify effective coping skills to deal with stressors  Outcome: Progressing

## 2021-04-20 PROCEDURE — 99232 SBSQ HOSP IP/OBS MODERATE 35: CPT | Performed by: PSYCHIATRY & NEUROLOGY

## 2021-04-20 RX ADMIN — LORAZEPAM 0.5 MG: 0.5 TABLET ORAL at 17:23

## 2021-04-20 RX ADMIN — LORAZEPAM 0.5 MG: 0.5 TABLET ORAL at 21:42

## 2021-04-20 RX ADMIN — DIPHENHYDRAMINE HCL 25 MG: 25 TABLET ORAL at 21:42

## 2021-04-20 RX ADMIN — LIDOCAINE 1 PATCH: 50 PATCH CUTANEOUS at 08:54

## 2021-04-20 RX ADMIN — HYDROXYZINE HYDROCHLORIDE 25 MG: 25 TABLET, FILM COATED ORAL at 00:33

## 2021-04-20 RX ADMIN — BENZTROPINE MESYLATE 1 MG: 1 TABLET ORAL at 21:41

## 2021-04-20 RX ADMIN — GABAPENTIN 600 MG: 300 CAPSULE ORAL at 08:54

## 2021-04-20 RX ADMIN — GABAPENTIN 600 MG: 300 CAPSULE ORAL at 17:23

## 2021-04-20 RX ADMIN — GABAPENTIN 600 MG: 300 CAPSULE ORAL at 12:06

## 2021-04-20 RX ADMIN — LORAZEPAM 0.5 MG: 0.5 TABLET ORAL at 08:54

## 2021-04-20 RX ADMIN — OLANZAPINE 10 MG: 10 TABLET, FILM COATED ORAL at 08:55

## 2021-04-20 RX ADMIN — OLANZAPINE 10 MG: 10 TABLET, FILM COATED ORAL at 17:23

## 2021-04-20 RX ADMIN — FENOFIBRATE 145 MG: 145 TABLET ORAL at 08:54

## 2021-04-20 RX ADMIN — LORAZEPAM 0.5 MG: 0.5 TABLET ORAL at 12:06

## 2021-04-20 RX ADMIN — GABAPENTIN 600 MG: 300 CAPSULE ORAL at 21:42

## 2021-04-20 RX ADMIN — OLANZAPINE 2.5 MG: 5 TABLET, ORALLY DISINTEGRATING ORAL at 00:33

## 2021-04-20 RX ADMIN — NICOTINE 1 PATCH: 21 PATCH, EXTENDED RELEASE TRANSDERMAL at 08:54

## 2021-04-20 RX ADMIN — ESCITALOPRAM OXALATE 20 MG: 10 TABLET ORAL at 08:54

## 2021-04-20 NOTE — PLAN OF CARE
Problem: Alteration in Thoughts and Perception  Goal: Treatment Goal: Gain control of psychotic behaviors/thinking, reduce/eliminate presenting symptoms and demonstrate improved reality functioning upon discharge  Outcome: Progressing  Goal: Agree to be compliant with medication regime, as prescribed and report medication side effects  Description: Interventions:  - Offer appropriate PRN medication and supervise ingestion; conduct AIMS, as needed   Outcome: Progressing  Goal: Attend and participate in unit activities, including therapeutic, recreational, and educational groups  Description: Interventions:  -Encourage Visitation and family involvement in care  Outcome: Progressing  Goal: Recognize dysfunctional thoughts, communicate reality-based thoughts at the time of discharge  Description: Interventions:  - Provide medication and psycho-education to assist patient in compliance and developing insight into his/her illness   Outcome: Progressing  Goal: Complete daily ADLs, including personal hygiene independently, as able  Description: Interventions:  - Observe, teach, and assist patient with ADLS  - Monitor and promote a balance of rest/activity, with adequate nutrition and elimination   Outcome: Progressing     Problem: Ineffective Coping  Goal: Cooperates with admission process  Description: Interventions:   - Complete admission process  Outcome: Progressing  Goal: Participates in unit activities  Description: Interventions:  - Provide therapeutic environment   - Provide required programming   - Redirect inappropriate behaviors   Outcome: Progressing  Goal: Understands least restrictive measures  Description: Interventions:  - Utilize least restrictive behavior  Outcome: Progressing  Goal: Free from restraint events  Description: - Utilize least restrictive measures   - Provide behavioral interventions   - Redirect inappropriate behaviors   Outcome: Progressing     Problem: Risk for Self Injury/Neglect  Goal: Treatment Goal: Remain safe during length of stay, learn and adopt new coping skills, and be free of self-injurious ideation, impulses and acts at the time of discharge  Outcome: Progressing  Goal: Verbalize thoughts and feelings  Description: Interventions:  - Assess and re-assess patient's lethality and potential for self-injury  - Engage patient in 1:1 interactions, daily, for a minimum of 15 minutes  - Encourage patient to express feelings, fears, frustrations, hopes  - Establish rapport/trust with patient   Outcome: Progressing  Goal: Refrain from harming self  Description: Interventions:  - Monitor patient closely, per order  - Develop a trusting relationship  - Supervise medication ingestion, monitor effects and side effects   Outcome: Progressing  Goal: Complete daily ADLs, including personal hygiene independently, as able  Description: Interventions:  - Observe, teach, and assist patient with ADLS  - Monitor and promote a balance of rest/activity, with adequate nutrition and elimination  Outcome: Progressing     Problem: Depression  Goal: Treatment Goal: Demonstrate behavioral control of depressive symptoms, verbalize feelings of improved mood/affect, and adopt new coping skills prior to discharge  Outcome: Progressing  Goal: Verbalize thoughts and feelings  Description: Interventions:  - Assess and re-assess patient's level of risk   - Engage patient in 1:1 interactions, daily, for a minimum of 15 minutes   - Encourage patient to express feelings, fears, frustrations, hopes   Outcome: Progressing  Goal: Refrain from harming self  Description: Interventions:  - Monitor patient closely, per order   - Supervise medication ingestion, monitor effects and side effects   Outcome: Progressing  Goal: Refrain from isolation  Description: Interventions:  - Develop a trusting relationship   - Encourage socialization   Outcome: Progressing  Goal: Refrain from self-neglect  Outcome: Progressing  Goal: Attend and participate in unit activities, including therapeutic, recreational, and educational groups  Description: Interventions:  - Provide therapeutic and educational activities daily, encourage attendance and participation, and document same in the medical record   Outcome: Progressing  Goal: Complete daily ADLs, including personal hygiene independently, as able  Description: Interventions:  - Observe, teach, and assist patient with ADLS  -  Monitor and promote a balance of rest/activity, with adequate nutrition and elimination   Outcome: Progressing     Problem: Risk for Violence/Aggression Toward Others  Goal: Treatment Goal: Refrain from acts of violence/aggression during length of stay, and demonstrate improved impulse control at the time of discharge  Outcome: Progressing  Goal: Verbalize thoughts and feelings  Description: Interventions:  - Assess and re-assess patient's level of risk, every waking shift  - Engage patient in 1:1 interactions, daily, for a minimum of 15 minutes   - Allow patient to express feelings and frustrations in a safe and non-threatening manner   - Establish rapport/trust with patient   Outcome: Progressing  Goal: Refrain from harming others  Outcome: Progressing  Goal: Refrain from destructive acts on the environment or property  Outcome: Progressing  Goal: Attend and participate in unit activities, including therapeutic, recreational, and educational groups  Description: Interventions:  - Provide therapeutic and educational activities daily, encourage attendance and participation, and document same in the medical record   Outcome: Progressing     Problem: Alteration in Orientation  Goal: Treatment Goal: Demonstrate a reduction of confusion and improved orientation to person, place, time and/or situation upon discharge, according to optimum baseline/ability  Outcome: Progressing  Goal: Interact with staff daily  Description: Interventions:  - Assess and re-assess patient's level of orientation  - Engage patient in 1 on 1 interactions, daily, for a minimum of 15 minutes   - Establish rapport/trust with patient   Outcome: Progressing  Goal: Allow medical examinations, as recommended  Description: Interventions:  - Provide physical/neurological exams and/or referrals, per provider   Outcome: Progressing  Goal: Cooperate with recommended testing/procedures  Description: Interventions:  - Determine need for ancillary testing  - Observe for mental status changes  - Implement falls/precaution protocol   Outcome: Progressing  Goal: Attend and participate in unit activities, including therapeutic, recreational, and educational groups  Description: Interventions:  - Provide therapeutic and educational activities daily, encourage attendance and participation, and document same in the medical record   - Provide appropriate opportunities for reminiscence   - Provide a consistent daily routine   - Encourage family contact/visitation   Outcome: Progressing  Goal: Complete daily ADLs, including personal hygiene independently, as able  Description: Interventions:  - Observe, teach, and assist patient with ADLS  Outcome: Progressing     Problem: Individualized Interventions  Goal: Patient will verbalize appropriate use of telephone within 5 days  Description: Interventions:  - Treatment team to determine use of supervised phone privileges   Outcome: Progressing  Goal: Patient will verbalize need for hospitalization and will no longer attempt elopement within 5 days  Description: Interventions:  - Ongoing education to help patient understand need for hospitalization  Outcome: Progressing  Goal: Patient will recognize inappropriate behaviors and develop alternative behaviors within 5 days  Description: Interventions:  - Patient in collaboration with Treatment Team will develop a behavior management plan to help identify effective coping skills to deal with stressors  Outcome: Progressing     Problem: DISCHARGE PLANNING - CARE MANAGEMENT  Goal: Discharge to post-acute care or home with appropriate resources  Description: INTERVENTIONS:  - Conduct assessment to determine patient/family and health care team treatment goals, and need for post-acute services based on payer coverage, community resources, and patient preferences, and barriers to discharge  - Address psychosocial, clinical, and financial barriers to discharge as identified in assessment in conjunction with the patient/family and health care team  - Arrange appropriate level of post-acute services according to patients   needs and preference and payer coverage in collaboration with the physician and health care team  - Communicate with and update the patient/family, physician, and health care team regarding progress on the discharge plan  - Arrange appropriate transportation to post-acute venues  Outcome: Progressing     Problem: Nutrition/Hydration-ADULT  Goal: Nutrient/Hydration intake appropriate for improving, restoring or maintaining nutritional needs  Description: Monitor and assess patient's nutrition/hydration status for malnutrition  Collaborate with interdisciplinary team and initiate plan and interventions as ordered  Monitor patient's weight and dietary intake as ordered or per policy  Utilize nutrition screening tool and intervene as necessary  Determine patient's food preferences and provide high-protein, high-caloric foods as appropriate       INTERVENTIONS:  - Monitor oral intake, urinary output, labs, and treatment plans  - Assess nutrition and hydration status and recommend course of action  - Evaluate amount of meals eaten  - Assist patient with eating if necessary   - Allow adequate time for meals  - Recommend/ encourage appropriate diets, oral nutritional supplements, and vitamin/mineral supplements  - Order, calculate, and assess calorie counts as needed  - Recommend, monitor, and adjust tube feedings and TPN/PPN based on assessed needs  - Assess need for intravenous fluids  - Provide specific nutrition/hydration education as appropriate  - Include patient/family/caregiver in decisions related to nutrition  Outcome: Not Progressing     Problem: Alteration in Thoughts and Perception  Goal: Verbalize thoughts and feelings  Description: Interventions:  - Promote a nonjudgmental and trusting relationship with the patient through active listening and therapeutic communication  - Assess patient's level of functioning, behavior and potential for risk  - Engage patient in 1 on 1 interactions  - Encourage patient to express fears, feelings, frustrations, and discuss symptoms    - [place] patient to reality, help patient recognize reality-based thinking   - Administer medications as ordered and assess for potential side effects  - Provide the patient education related to the signs and symptoms of the illness and desired effects of prescribed medications  Outcome: Not Progressing  Goal: Refrain from acting on delusional thinking/internal stimuli  Description: Interventions:  - Monitor patient closely, per order   - Utilize least restrictive measures   - Set reasonable limits, give positive feedback for acceptable   - Administer medications as ordered and monitor of potential side effects  Outcome: Not Progressing     Problem: Ineffective Coping  Goal: Identifies ineffective coping skills  Outcome: Not Progressing  Goal: Identifies healthy coping skills  Outcome: Not Progressing  Goal: Demonstrates healthy coping skills  Outcome: Not Progressing  Goal: Patient/Family participate in treatment and DC plans  Description: Interventions:  - Provide therapeutic environment  Outcome: Not Progressing  Goal: Patient/Family verbalizes awareness of resources  Outcome: Not Progressing     Problem: Risk for Self Injury/Neglect  Goal: Attend and participate in unit activities, including therapeutic, recreational, and educational groups  Description: Interventions:  - Provide therapeutic and educational activities daily, encourage attendance and participation, and document same in the medical record  - Obtain collateral information, encourage visitation and family involvement in care   Outcome: Not Progressing  Goal: Recognize maladaptive responses and adopt new coping mechanisms  Outcome: Not Progressing     Problem: Anxiety  Goal: Anxiety is at manageable level  Description: Interventions:  - Assess and monitor patient's anxiety level  - Monitor for signs and symptoms (heart palpitations, chest pain, shortness of breath, headaches, nausea, feeling jumpy, restlessness, irritable, apprehensive)  - Collaborate with interdisciplinary team and initiate plan and interventions as ordered    - [place] patient to unit/surroundings  - Explain treatment plan  - Encourage participation in care  - Encourage verbalization of concerns/fears  - Identify coping mechanisms  - Assist in developing anxiety-reducing skills  - Administer/offer alternative therapies  - Limit or eliminate stimulants  Outcome: Not Progressing     Problem: Risk for Violence/Aggression Toward Others  Goal: Control angry outbursts  Description: Interventions:  - Monitor patient closely, per order  - Ensure early verbal de-escalation  - Monitor prn medication needs  - Set reasonable/therapeutic limits, outline behavioral expectations, and consequences   - Provide a non-threatening milieu, utilizing the least restrictive interventions   Outcome: Not Progressing  Goal: Identify appropriate positive anger management techniques  Description: Interventions:  - Offer anger management and coping skills groups   - Staff will provide positive feedback for appropriate anger control  Outcome: Not Progressing     Problem: Alteration in Orientation  Goal: Express concerns related to confused thinking related to:  Description: Interventions:  - Encourage patient to express feelings, fears, frustrations, hopes  - Assign consistent caregivers - Lopez/re-orient patient as needed  - Allow comfort items, as appropriate  - Provide visual cues, signs, etc    Outcome: Not Progressing

## 2021-04-20 NOTE — NURSING NOTE
Patient has been out in the milieu and social with select peers  Patient is medication complaint, and denies SI/HI/AH/VH  Nursing was contacted about patient's tooth pain, and it was noted that there is a consult that was placed by JARED Waller for dentistry  Case management is possibly arranging for the patient to see a dentist to follow-up with the care of her tooth pain  Patient offers no other complaints

## 2021-04-20 NOTE — PROGRESS NOTES
04/20/21 0700   Activity/Group Checklist   Group   (Coffee Talk )   Attendance Attended   Attendance Duration (min) 46-60   Interactions Interacted appropriately   Affect/Mood Appropriate;Bright;Calm;Normal range   Goals Achieved Identified feelings; Able to engage in interactions; Able to listen to others

## 2021-04-20 NOTE — NURSING NOTE
Patient has been out and present in the milieu  Patient has attended groups and is complaint with her medications  Patient denies SI/HI/AH/VH, and is bright on approach  Patient has been mostly walking the halls this evening for she states that she does not want to fall asleep early  Patient has been pleasant all evening, and offers no complaints

## 2021-04-20 NOTE — PROGRESS NOTES
04/20/21 1400   Activity/Group Checklist   Group   (Recovery Workshop )   Attendance Attended   Attendance Duration (min) 31-45   Interactions Interacted appropriately   Affect/Mood Appropriate;Bright;Calm;Normal range   Goals Achieved Identified feelings; Able to listen to others; Able to engage in interactions

## 2021-04-20 NOTE — NURSING NOTE
Zyprexa 2 5mg given for mild agitation, score of 25 on the agitated behavior scale, atarax 25mg given for mild anxiety score of !% on the [facility] scale, out of bed in the acharya, gait steady, complaint of dry mouth and nightmares, stating that she can not sleep, she is afraid to fall asleep due to nightmares, ice water and other comfort measures attempted, stated that she might try reading in bed for a while, encouragement provided for identifying coping skills

## 2021-04-20 NOTE — PROGRESS NOTES
04/20/21 0900   Activity/Group Checklist   Group Community meeting   Attendance Attended   Attendance Duration (min) 16-30   Interactions Interacted appropriately   Affect/Mood Appropriate;Calm;Normal range   Goals Achieved Able to listen to others; Able to engage in interactions

## 2021-04-20 NOTE — PROGRESS NOTES
04/20/21 1100   Activity/Group Checklist   Group   (IMR)   Attendance Attended   Attendance Duration (min) 46-60   Interactions Interacted appropriately   Affect/Mood Appropriate;Bright;Calm;Normal range   Goals Achieved Identified feelings; Able to listen to others; Able to engage in interactions; Able to self-disclose

## 2021-04-21 ENCOUNTER — IMMUNIZATIONS (INPATIENT)
Dept: FAMILY MEDICINE CLINIC | Facility: HOSPITAL | Age: 51
DRG: 750 | End: 2021-04-21
Payer: COMMERCIAL

## 2021-04-21 DIAGNOSIS — Z23 ENCOUNTER FOR IMMUNIZATION: Primary | ICD-10-CM

## 2021-04-21 PROCEDURE — 0011A SARS-COV-2 / COVID-19 MRNA VACCINE (MODERNA) 100 MCG: CPT

## 2021-04-21 PROCEDURE — 99232 SBSQ HOSP IP/OBS MODERATE 35: CPT | Performed by: PSYCHIATRY & NEUROLOGY

## 2021-04-21 PROCEDURE — 91301 SARS-COV-2 / COVID-19 MRNA VACCINE (MODERNA) 100 MCG: CPT

## 2021-04-21 PROCEDURE — XW023S6 INTRODUCTION OF COVID-19 VACCINE DOSE 1 INTO MUSCLE, PERCUTANEOUS APPROACH, NEW TECHNOLOGY GROUP 6: ICD-10-PCS | Performed by: PSYCHIATRY & NEUROLOGY

## 2021-04-21 RX ADMIN — OLANZAPINE 10 MG: 10 TABLET, FILM COATED ORAL at 08:58

## 2021-04-21 RX ADMIN — GABAPENTIN 600 MG: 300 CAPSULE ORAL at 08:58

## 2021-04-21 RX ADMIN — LORAZEPAM 0.5 MG: 0.5 TABLET ORAL at 08:58

## 2021-04-21 RX ADMIN — LORAZEPAM 0.5 MG: 0.5 TABLET ORAL at 21:15

## 2021-04-21 RX ADMIN — GABAPENTIN 600 MG: 300 CAPSULE ORAL at 12:20

## 2021-04-21 RX ADMIN — LIDOCAINE 1 PATCH: 50 PATCH CUTANEOUS at 08:58

## 2021-04-21 RX ADMIN — HYDROXYZINE HYDROCHLORIDE 25 MG: 25 TABLET, FILM COATED ORAL at 05:00

## 2021-04-21 RX ADMIN — LIDOCAINE HYDROCHLORIDE 10 ML: 20 SOLUTION ORAL; TOPICAL at 10:26

## 2021-04-21 RX ADMIN — GABAPENTIN 600 MG: 300 CAPSULE ORAL at 21:16

## 2021-04-21 RX ADMIN — LORAZEPAM 0.5 MG: 0.5 TABLET ORAL at 17:30

## 2021-04-21 RX ADMIN — GABAPENTIN 600 MG: 300 CAPSULE ORAL at 17:30

## 2021-04-21 RX ADMIN — ESCITALOPRAM OXALATE 20 MG: 10 TABLET ORAL at 08:58

## 2021-04-21 RX ADMIN — HYDROXYZINE HYDROCHLORIDE 50 MG: 50 TABLET, FILM COATED ORAL at 23:08

## 2021-04-21 RX ADMIN — DIPHENHYDRAMINE HCL 25 MG: 25 TABLET ORAL at 21:16

## 2021-04-21 RX ADMIN — OLANZAPINE 7.5 MG: 5 TABLET, ORALLY DISINTEGRATING ORAL at 09:55

## 2021-04-21 RX ADMIN — BENZTROPINE MESYLATE 1 MG: 1 TABLET ORAL at 21:16

## 2021-04-21 RX ADMIN — LORAZEPAM 0.5 MG: 0.5 TABLET ORAL at 12:20

## 2021-04-21 RX ADMIN — BENZTROPINE MESYLATE 1 MG: 1 TABLET ORAL at 16:25

## 2021-04-21 RX ADMIN — PHENYTOIN 1 MG: 125 SUSPENSION ORAL at 21:16

## 2021-04-21 RX ADMIN — NICOTINE 1 PATCH: 21 PATCH, EXTENDED RELEASE TRANSDERMAL at 08:59

## 2021-04-21 RX ADMIN — FENOFIBRATE 145 MG: 145 TABLET ORAL at 08:58

## 2021-04-21 RX ADMIN — OLANZAPINE 10 MG: 10 TABLET, FILM COATED ORAL at 17:30

## 2021-04-21 NOTE — PROGRESS NOTES
04/21/21 1300   Activity/Group Checklist   Group   (Co-occurring group)   Attendance Attended   Attendance Duration (min) 46-60   Interactions Interacted appropriately   Affect/Mood Appropriate   Goals Achieved Discussed self-esteem issues; Able to listen to others; Able to engage in interactions; Able to reflect/comment on own behavior;Able to self-disclose; Able to recieve feedback   Patient appears to be in admission of her alcoholism in the fact she ends up hospitalized when she drinks  There appears to be some minimization of her powerless which we will address in the upcoming groups

## 2021-04-21 NOTE — NURSING NOTE
Patient is visible in the early evening then a two hours after dinner she went to bed  She spoke on the phone and was mostly calm with an inpatient  Irritable edge  She c/o "leg stiffness" and requested/received Cogentin 1mg prn at 1630 which she stated "eased the symptoms"   She denies SI, HI, depression and anxiety (although she is on  Ativan 0 5 mg QID)  Behavior in control Dental appointment tomorrow 11am  Patient c/o moderate anxiety   Atarax 50 mg given at 2315

## 2021-04-21 NOTE — NURSING NOTE
Atarax 25mg given for complaints of having nightmares, being anxious and ot states she wants to go back to sleep

## 2021-04-21 NOTE — PROGRESS NOTES
Progress Note - Behavioral Health     Mina Jones 53 y o  female MRN: [id number]   Unit/Bed#: [facility] 592-47 Encounter: [id number]      Subjective:  Patient's chart reviewed and case discussed with the treatment team   The patient was seen in milieu today   Still complains of toothache and will be assessed by the dentist tomorrow in the morning  She got the appointment    Reports mood otherwise has been  Better  She denies any SI or HI  Feels current medication has been very helpful  Denies any depression or anxiety  Denies any auditory or visual hallucination today  Reports nightmare also has been better though not resolved  anxiety has been better  Compliant with the medication and denies any side effect  As per the nurse the patient got Atarax 25 mg early morning for mild anxiety  No behavior issues otherwise  Her Minipress was held due to hypotension parameters  ROS: all other systems are negative    Mental Status Evaluation:    Appearance:  casually dressed   Behavior:  normal   Speech:  normal rate, normal volume, normal pitch   Mood:  normal   Affect:  constricted   Thought Process:  organized   Associations: intact associations   Thought Content:  normal   Perceptual Disturbances:  denies any auditory or visual hallucination today     Risk Potential: Suicidal ideation - None  Homicidal ideation - None  Potential for aggression - No   Sensorium:  oriented to person, place and time/date   Memory:  recent and remote memory grossly intact   Consciousness:  alert and awake   Attention: attention span and concentration are age appropriate   Insight:  age appropriate   Judgment: age appropriate   Gait/Station: normal gait/station   Motor Activity: no abnormal movements     Vital signs in last 24 hours:    Temp:  [97 5 °F (36 4 °C)-97 8 °F (36 6 °C)] 97 8 °F (36 6 °C)  HR:  [65-86] 84  Resp:  [18] 18  BP: ()/(50-76) 106/67    Laboratory results: I have personally reviewed all pertinent laboratory/tests results  Progress Toward Goals: improving    Assessment/Plan   Mood has been much better  Mild anxiety at times but responds well to p r n  medication  Plan is to continue with the current medication at this time with no changes  Will continue monitor the patient for her mood, behavior, safety, sleep and response to meds  Will continue to provide her with group, milieu and occupational therapy  Principal Problem:    Schizoaffective disorder, bipolar type ([facility] [address] )  Active Problems:    Post-traumatic stress disorder, chronic    Medical clearance for psychiatric admission    Mild intermittent asthma without complication    Ear problem, bilateral    Recommended Treatment:     Planned medication and treatment changes:     All current active medications have been reviewed  Encourage group therapy, milieu therapy and occupational therapy  Behavioral Health checks every 7 minutes  Continue current medications:  Current Facility-Administered Medications   Medication Dose Route Frequency Provider Last Rate    acetaminophen  650 mg Oral Q6H PRN Latif Calamity Lodics, CRNP      acetaminophen  650 mg Oral Q4H PRN Salomon Bautistaamity Lodics, CRNP      acetaminophen  975 mg Oral Q6H PRN Isis Porterics, CRNP      al mag oxide-diphenhydramine-lidocaine viscous  10 mL Swish & Swallow Q4H PRN Orlikierra Coupe, CRNP      albuterol  2 puff Inhalation Q6H PRN Salomon Burrowsty Germanics, CRNP      benztropine  1 mg Intramuscular Q4H PRN Max 6/day Isis Porterics, CRNP      benztropine  1 mg Oral Q4H PRN Max 6/day Isis Porterics, CRNP      benztropine  1 mg Oral HS Isis Porterics, CRNP      calcium carbonate  500 mg Oral TID PRN Salomon Porterics, CRNP      diphenhydrAMINE  25 mg Oral HS Isis Porterics, CRNP      escitalopram  20 mg Oral Daily Isis N Lodics, CRNP      fenofibrate  145 mg Oral Daily Isis N Germanics, CRNP      gabapentin  600 mg Oral 4x Daily Isis Porterics, CRNP      haloperidol  5 mg Oral Q6H PRN Sara Lama, CRNP      hydrOXYzine HCL  25 mg Oral Q6H PRN Max 4/day Isis Lama, CRNP      hydrOXYzine HCL  50 mg Oral Q6H PRN Max 4/day Isis Lama, CRNP      lidocaine  1 patch Topical Daily Isis Lama, CRNP      LORazepam  1 mg Intramuscular Q6H PRN Arcenio Bañuelos MD      LORazepam  0 5 mg Oral 4x Daily Arcenio Bañuelos MD      magnesium hydroxide  30 mL Oral Daily PRN Sara Porterics, CRNP      nicotine  1 patch Transdermal Daily Skip Hoe, CRNP      OLANZapine  2 5 mg Oral Q8H PRN Skip Hoe, CRNP      OLANZapine  5 mg Oral Q3H PRN Skip Hoe, CRNP      OLANZapine  7 5 mg Oral Q3H PRN Skip Hoe, CRNP      OLANZapine  10 mg Intramuscular Q3H PRN Skip Hoe, CRNP      OLANZapine  5 mg Intramuscular Q3H PRN Skip Hoe, CRNP      OLANZapine  10 mg Oral BID Skip Hoe, CRNP      Pramox-PE-Glycerin-Petrolatum  1 application Rectal 4x Daily PRN Isis Lama, CRNP      prazosin  1 mg Oral HS Skip Hoe, CRNP         Risks / Benefits of Treatment:    Risks, benefits, and possible side effects of medications explained to patient and patient verbalizes understanding and agreement for treatment  Counseling / Coordination of Care:    Patient's progress discussed with staff in treatment team meeting  Medications, treatment progress and treatment plan reviewed with patient      Adela Alfonso MD 04/21/21

## 2021-04-21 NOTE — PROGRESS NOTES
04/21/21 0813   Team Meeting   Meeting Type Daily Rounds   Initial Conference Date 04/20/21   Patient/Family Present   Patient Present No   Patient's Family Present No         Daily Rounds Documentation  Team Members Participating  Audrey Engle LPN  [address] [id number], [id number] [facility]  Anastasia Feliz RN    Case reviewed  5/6 groups  Atarax for mild anxiety  Reporting feeling much better since medication changes  Less labile and irritable, more engaged and calm in conversation

## 2021-04-21 NOTE — PROGRESS NOTES
04/21/21 0900   Activity/Group Checklist   Group Community meeting   Attendance Attended   Attendance Duration (min) 16-30   Interactions Interacted appropriately   Affect/Mood Appropriate;Normal range   Goals Achieved Able to listen to others; Able to engage in interactions

## 2021-04-21 NOTE — SOCIAL WORK
Summary  SW met with patient for individual therapy  Pt cooperative, more subdued today  She started sharing that she had a few PRNs today due to her psychotic episode this morning  "I cant describe what's happening, just stuff in my brain   " denied any specific voices, then tried describing it as "a bunch of people making noise, bothering me, I'm afraid it will get worse    I'm afraid I'll go crazy and it won't go away   "  Pt has described this before, her main fear that she will become more and more psychotic and never be able to live her life  Pt stated it is a relief to find medication that helps now, and that she feels more hopeful about things overall  "Did you find out about the group home?" she went on to ask  SW explained that placement is a process, and that upon meeting next week for her treatment team, it will be opportune time to discuss and address the county again  Pt receptive, then stated "I'm just miserable here  Time spent exploring this, discussing how in the mean time she can take advantage of what the program offers, and be encouraged to know that while it takes time, her next step to a group home will be worth the wait since she has been so unstable both mentally and with her housing situation  Pt admitted to this as a "good opportunity" and is willing to wait  She then stated "I just don't want to go to rehab" and went on to share why she feels it would be more a disservice to her  Over the course of the session, rapport was built, goals further defined, discharge plans discussed, and pt able to identify both her strengths and barriers  She also identified coping skills and stated she's really happy that her former therapist is mailing her some coloring books and a journal  Session ended mutually with patient returning to nurse station requesting for cogentin due to her leg stiffness and discomfort

## 2021-04-21 NOTE — PLAN OF CARE
Problem: Nutrition/Hydration-ADULT  Goal: Nutrient/Hydration intake appropriate for improving, restoring or maintaining nutritional needs  Description: Monitor and assess patient's nutrition/hydration status for malnutrition  Collaborate with interdisciplinary team and initiate plan and interventions as ordered  Monitor patient's weight and dietary intake as ordered or per policy  Utilize nutrition screening tool and intervene as necessary  Determine patient's food preferences and provide high-protein, high-caloric foods as appropriate       INTERVENTIONS:  - Monitor oral intake, urinary output, labs, and treatment plans  - Assess nutrition and hydration status and recommend course of action  - Evaluate amount of meals eaten  - Assist patient with eating if necessary   - Allow adequate time for meals  - Recommend/ encourage appropriate diets, oral nutritional supplements, and vitamin/mineral supplements  - Order, calculate, and assess calorie counts as needed  - Recommend, monitor, and adjust tube feedings and TPN/PPN based on assessed needs  - Assess need for intravenous fluids  - Provide specific nutrition/hydration education as appropriate  - Include patient/family/caregiver in decisions related to nutrition  Outcome: Progressing     Problem: Alteration in Thoughts and Perception  Goal: Treatment Goal: Gain control of psychotic behaviors/thinking, reduce/eliminate presenting symptoms and demonstrate improved reality functioning upon discharge  Outcome: Progressing  Goal: Verbalize thoughts and feelings  Description: Interventions:  - Promote a nonjudgmental and trusting relationship with the patient through active listening and therapeutic communication  - Assess patient's level of functioning, behavior and potential for risk  - Engage patient in 1 on 1 interactions  - Encourage patient to express fears, feelings, frustrations, and discuss symptoms    - [place] patient to reality, help patient recognize reality-based thinking   - Administer medications as ordered and assess for potential side effects  - Provide the patient education related to the signs and symptoms of the illness and desired effects of prescribed medications  Outcome: Progressing  Goal: Agree to be compliant with medication regime, as prescribed and report medication side effects  Description: Interventions:  - Offer appropriate PRN medication and supervise ingestion; conduct AIMS, as needed   Outcome: Progressing  Goal: Attend and participate in unit activities, including therapeutic, recreational, and educational groups  Description: Interventions:  -Encourage Visitation and family involvement in care  Outcome: Progressing  Goal: Recognize dysfunctional thoughts, communicate reality-based thoughts at the time of discharge  Description: Interventions:  - Provide medication and psycho-education to assist patient in compliance and developing insight into his/her illness   Outcome: Progressing  Goal: Complete daily ADLs, including personal hygiene independently, as able  Description: Interventions:  - Observe, teach, and assist patient with ADLS  - Monitor and promote a balance of rest/activity, with adequate nutrition and elimination   Outcome: Progressing     Problem: Ineffective Coping  Goal: Cooperates with admission process  Description: Interventions:   - Complete admission process  Outcome: Progressing  Goal: Identifies ineffective coping skills  Outcome: Progressing  Goal: Identifies healthy coping skills  Outcome: Progressing  Goal: Demonstrates healthy coping skills  Outcome: Progressing  Goal: Participates in unit activities  Description: Interventions:  - Provide therapeutic environment   - Provide required programming   - Redirect inappropriate behaviors   Outcome: Progressing  Goal: Patient/Family participate in treatment and DC plans  Description: Interventions:  - Provide therapeutic environment  Outcome: Progressing  Goal: Patient/Family verbalizes awareness of resources  Outcome: Progressing  Goal: Understands least restrictive measures  Description: Interventions:  - Utilize least restrictive behavior  Outcome: Progressing  Goal: Free from restraint events  Description: - Utilize least restrictive measures   - Provide behavioral interventions   - Redirect inappropriate behaviors   Outcome: Progressing     Problem: Risk for Self Injury/Neglect  Goal: Treatment Goal: Remain safe during length of stay, learn and adopt new coping skills, and be free of self-injurious ideation, impulses and acts at the time of discharge  Outcome: Progressing  Goal: Verbalize thoughts and feelings  Description: Interventions:  - Assess and re-assess patient's lethality and potential for self-injury  - Engage patient in 1:1 interactions, daily, for a minimum of 15 minutes  - Encourage patient to express feelings, fears, frustrations, hopes  - Establish rapport/trust with patient   Outcome: Progressing  Goal: Refrain from harming self  Description: Interventions:  - Monitor patient closely, per order  - Develop a trusting relationship  - Supervise medication ingestion, monitor effects and side effects   Outcome: Progressing  Goal: Attend and participate in unit activities, including therapeutic, recreational, and educational groups  Description: Interventions:  - Provide therapeutic and educational activities daily, encourage attendance and participation, and document same in the medical record  - Obtain collateral information, encourage visitation and family involvement in care   Outcome: Progressing  Goal: Recognize maladaptive responses and adopt new coping mechanisms  Outcome: Progressing  Goal: Complete daily ADLs, including personal hygiene independently, as able  Description: Interventions:  - Observe, teach, and assist patient with ADLS  - Monitor and promote a balance of rest/activity, with adequate nutrition and elimination  Outcome: Progressing Problem: Depression  Goal: Treatment Goal: Demonstrate behavioral control of depressive symptoms, verbalize feelings of improved mood/affect, and adopt new coping skills prior to discharge  Outcome: Progressing  Goal: Verbalize thoughts and feelings  Description: Interventions:  - Assess and re-assess patient's level of risk   - Engage patient in 1:1 interactions, daily, for a minimum of 15 minutes   - Encourage patient to express feelings, fears, frustrations, hopes   Outcome: Progressing  Goal: Refrain from harming self  Description: Interventions:  - Monitor patient closely, per order   - Supervise medication ingestion, monitor effects and side effects   Outcome: Progressing  Goal: Refrain from isolation  Description: Interventions:  - Develop a trusting relationship   - Encourage socialization   Outcome: Progressing  Goal: Refrain from self-neglect  Outcome: Progressing  Goal: Attend and participate in unit activities, including therapeutic, recreational, and educational groups  Description: Interventions:  - Provide therapeutic and educational activities daily, encourage attendance and participation, and document same in the medical record   Outcome: Progressing  Goal: Complete daily ADLs, including personal hygiene independently, as able  Description: Interventions:  - Observe, teach, and assist patient with ADLS  -  Monitor and promote a balance of rest/activity, with adequate nutrition and elimination   Outcome: Progressing     Problem: Anxiety  Goal: Anxiety is at manageable level  Description: Interventions:  - Assess and monitor patient's anxiety level  - Monitor for signs and symptoms (heart palpitations, chest pain, shortness of breath, headaches, nausea, feeling jumpy, restlessness, irritable, apprehensive)  - Collaborate with interdisciplinary team and initiate plan and interventions as ordered    - [place] patient to unit/surroundings  - Explain treatment plan  - Encourage participation in care  - Encourage verbalization of concerns/fears  - Identify coping mechanisms  - Assist in developing anxiety-reducing skills  - Administer/offer alternative therapies  - Limit or eliminate stimulants  Outcome: Progressing     Problem: Risk for Violence/Aggression Toward Others  Goal: Treatment Goal: Refrain from acts of violence/aggression during length of stay, and demonstrate improved impulse control at the time of discharge  Outcome: Progressing  Goal: Verbalize thoughts and feelings  Description: Interventions:  - Assess and re-assess patient's level of risk, every waking shift  - Engage patient in 1:1 interactions, daily, for a minimum of 15 minutes   - Allow patient to express feelings and frustrations in a safe and non-threatening manner   - Establish rapport/trust with patient   Outcome: Progressing  Goal: Refrain from harming others  Outcome: Progressing  Goal: Refrain from destructive acts on the environment or property  Outcome: Progressing  Goal: Control angry outbursts  Description: Interventions:  - Monitor patient closely, per order  - Ensure early verbal de-escalation  - Monitor prn medication needs  - Set reasonable/therapeutic limits, outline behavioral expectations, and consequences   - Provide a non-threatening milieu, utilizing the least restrictive interventions   Outcome: Progressing  Goal: Attend and participate in unit activities, including therapeutic, recreational, and educational groups  Description: Interventions:  - Provide therapeutic and educational activities daily, encourage attendance and participation, and document same in the medical record   Outcome: Progressing  Goal: Identify appropriate positive anger management techniques  Description: Interventions:  - Offer anger management and coping skills groups   - Staff will provide positive feedback for appropriate anger control  Outcome: Progressing     Problem: Alteration in Orientation  Goal: Treatment Goal: Demonstrate a reduction of confusion and improved orientation to person, place, time and/or situation upon discharge, according to optimum baseline/ability  Outcome: Progressing  Goal: Interact with staff daily  Description: Interventions:  - Assess and re-assess patient's level of orientation  - Engage patient in 1 on 1 interactions, daily, for a minimum of 15 minutes   - Establish rapport/trust with patient   Outcome: Progressing  Goal: Express concerns related to confused thinking related to:  Description: Interventions:  - Encourage patient to express feelings, fears, frustrations, hopes  - Assign consistent caregivers   - [place]/re-orient patient as needed  - Allow comfort items, as appropriate  - Provide visual cues, signs, etc    Outcome: Progressing  Goal: Allow medical examinations, as recommended  Description: Interventions:  - Provide physical/neurological exams and/or referrals, per provider   Outcome: Progressing  Goal: Cooperate with recommended testing/procedures  Description: Interventions:  - Determine need for ancillary testing  - Observe for mental status changes  - Implement falls/precaution protocol   Outcome: Progressing  Goal: Attend and participate in unit activities, including therapeutic, recreational, and educational groups  Description: Interventions:  - Provide therapeutic and educational activities daily, encourage attendance and participation, and document same in the medical record   - Provide appropriate opportunities for reminiscence   - Provide a consistent daily routine   - Encourage family contact/visitation   Outcome: Progressing  Goal: Complete daily ADLs, including personal hygiene independently, as able  Description: Interventions:  - Observe, teach, and assist patient with ADLS  Outcome: Progressing     Problem: Individualized Interventions  Goal: Patient will verbalize appropriate use of telephone within 5 days  Description: Interventions:  - Treatment team to determine use of supervised phone privileges   Outcome: Progressing  Goal: Patient will verbalize need for hospitalization and will no longer attempt elopement within 5 days  Description: Interventions:  - Ongoing education to help patient understand need for hospitalization  Outcome: Progressing  Goal: Patient will recognize inappropriate behaviors and develop alternative behaviors within 5 days  Description: Interventions:  - Patient in collaboration with Treatment Team will develop a behavior management plan to help identify effective coping skills to deal with stressors  Outcome: Progressing     Problem: DISCHARGE PLANNING - CARE MANAGEMENT  Goal: Discharge to post-acute care or home with appropriate resources  Description: INTERVENTIONS:  - Conduct assessment to determine patient/family and health care team treatment goals, and need for post-acute services based on payer coverage, community resources, and patient preferences, and barriers to discharge  - Address psychosocial, clinical, and financial barriers to discharge as identified in assessment in conjunction with the patient/family and health care team  - Arrange appropriate level of post-acute services according to patients   needs and preference and payer coverage in collaboration with the physician and health care team  - Communicate with and update the patient/family, physician, and health care team regarding progress on the discharge plan  - Arrange appropriate transportation to post-acute venues  Outcome: Progressing

## 2021-04-21 NOTE — PROGRESS NOTES
04/21/21 1203   Team Meeting   Meeting Type Tx Team Meeting   Initial Conference Date 04/20/21   Team Members Present   Team Members Present Physician;Nurse; Other (Discipline and Name)   Physician Team Member Dr Aidan Martin MD   Nursing Team Member Pedrito Leyva, YUDY   Social Work Team Member Jerod Vizcarra [place]   Other (Discipline and Name) Albert Karimi, DAMIAN; [facility]   Patient/Family Present   Patient Present Yes   Patient's Family Present No       Summary  Patient presented for her treatment team this morning without a completed self assessment  Patient stated she was uncertain what the form was or how to obtain it  Again reviewed the normal process and recommendation for patient to complete day prior  Patient verbalized understanding  Patient reported her main barrier has been feeling afraid of becoming 'psychotic' again  She described this further  She reported she does not like having panic attacks and used to have them frequently  Patient stated her medication changes have really helped her feel more stable  She reported list of medications and described how she feels more controlled and less emotional  Patient reported good self care measures, and asked about day passes, as well as discharge plan as she continues to inquire about group homes  SW addressed along with [address], who went on to share that a referral can be made for housing - with hopes that as patient continues to progress we can discuss more specifically what programs best suit her, depending upon her needs and treatment team recommendation  SW to complete [facility] housing referral and follow up  Patient reported ongoing dental pain  SW to also follow up with scheduling an OP dental visit as the order has already been placed by medical  Patient in agreement  Meeting ended mutually

## 2021-04-21 NOTE — NURSING NOTE
Patient asleep during snack  Got up at 2145, took her medications and ate a snack   Her Minipress was held due to hypotension parameters

## 2021-04-21 NOTE — NURSING NOTE
Patient out on the unit screaming "Shit and Fuck " Patient was very agitated and angry  Patient was redirected and she continued with this behavior  Patient was given zyprexa po, prn    Patient stated "I want the people who are doing this to me arrested "  Patient pacing in her room and blaming "them for her psychosis "

## 2021-04-21 NOTE — PROGRESS NOTES
04/21/21 1100   Activity/Group Checklist   Group   (IMR)   Attendance Attended   Attendance Duration (min) 46-60   Interactions Interacted appropriately   Affect/Mood Appropriate;Bright;Calm;Normal range   Goals Achieved Identified feelings; Discussed coping strategies; Identified distorted thoughts/beliefs; Identified resources and support systems; Able to listen to others; Able to engage in interactions; Able to reflect/comment on own behavior;Able to manage/cope with feelings; Able to self-disclose

## 2021-04-21 NOTE — PROGRESS NOTES
04/21/21 0700   Activity/Group Checklist   Group   (Coffee Talk )   Attendance Attended   Attendance Duration (min) 46-60   Interactions Interacted appropriately   Affect/Mood Appropriate;Calm;Normal range   Goals Achieved Identified feelings; Able to listen to others; Able to engage in interactions; Able to self-disclose WDL

## 2021-04-21 NOTE — SOCIAL WORK
Per patient and medical, OP dental appointment made at [facility] tomorrow for 11AM with Dr Caroline Foster  [address]

## 2021-04-22 PROCEDURE — 99232 SBSQ HOSP IP/OBS MODERATE 35: CPT | Performed by: PSYCHIATRY & NEUROLOGY

## 2021-04-22 RX ORDER — AMOXICILLIN 500 MG/1
500 CAPSULE ORAL EVERY 8 HOURS SCHEDULED
Status: COMPLETED | OUTPATIENT
Start: 2021-04-22 | End: 2021-04-29

## 2021-04-22 RX ADMIN — GABAPENTIN 600 MG: 300 CAPSULE ORAL at 12:53

## 2021-04-22 RX ADMIN — BENZTROPINE MESYLATE 1 MG: 1 TABLET ORAL at 17:01

## 2021-04-22 RX ADMIN — LIDOCAINE 1 PATCH: 50 PATCH CUTANEOUS at 08:13

## 2021-04-22 RX ADMIN — OLANZAPINE 10 MG: 10 TABLET, FILM COATED ORAL at 18:03

## 2021-04-22 RX ADMIN — GABAPENTIN 600 MG: 300 CAPSULE ORAL at 21:25

## 2021-04-22 RX ADMIN — AMOXICILLIN 500 MG: 500 CAPSULE ORAL at 15:44

## 2021-04-22 RX ADMIN — OLANZAPINE 2.5 MG: 5 TABLET, ORALLY DISINTEGRATING ORAL at 04:21

## 2021-04-22 RX ADMIN — PHENYTOIN 1 MG: 125 SUSPENSION ORAL at 21:26

## 2021-04-22 RX ADMIN — ESCITALOPRAM OXALATE 20 MG: 10 TABLET ORAL at 08:12

## 2021-04-22 RX ADMIN — DIPHENHYDRAMINE HCL 25 MG: 25 TABLET ORAL at 21:26

## 2021-04-22 RX ADMIN — HYDROXYZINE HYDROCHLORIDE 50 MG: 50 TABLET, FILM COATED ORAL at 05:40

## 2021-04-22 RX ADMIN — LORAZEPAM 0.5 MG: 0.5 TABLET ORAL at 18:03

## 2021-04-22 RX ADMIN — GABAPENTIN 600 MG: 300 CAPSULE ORAL at 18:03

## 2021-04-22 RX ADMIN — BENZTROPINE MESYLATE 1 MG: 1 TABLET ORAL at 21:26

## 2021-04-22 RX ADMIN — LORAZEPAM 0.5 MG: 0.5 TABLET ORAL at 21:25

## 2021-04-22 RX ADMIN — AMOXICILLIN 500 MG: 500 CAPSULE ORAL at 22:26

## 2021-04-22 RX ADMIN — LORAZEPAM 0.5 MG: 0.5 TABLET ORAL at 12:53

## 2021-04-22 RX ADMIN — LIDOCAINE HYDROCHLORIDE 10 ML: 20 SOLUTION ORAL; TOPICAL at 10:42

## 2021-04-22 RX ADMIN — NICOTINE 1 PATCH: 21 PATCH, EXTENDED RELEASE TRANSDERMAL at 10:41

## 2021-04-22 RX ADMIN — OLANZAPINE 10 MG: 10 TABLET, FILM COATED ORAL at 08:13

## 2021-04-22 RX ADMIN — GABAPENTIN 600 MG: 300 CAPSULE ORAL at 08:12

## 2021-04-22 RX ADMIN — LORAZEPAM 0.5 MG: 0.5 TABLET ORAL at 08:12

## 2021-04-22 RX ADMIN — FENOFIBRATE 145 MG: 145 TABLET ORAL at 08:12

## 2021-04-22 RX ADMIN — ACETAMINOPHEN 975 MG: 325 TABLET ORAL at 15:44

## 2021-04-22 NOTE — NURSING NOTE
Amy Hatch awake at this time c/o nightmares  Appears preoccupied  Requested and given Zyprexa 2 5 mg po  Retired to her room  Will evaluate for effectiveness  Jen Villegas

## 2021-04-22 NOTE — PROGRESS NOTES
04/21/21 1400   Activity/Group Checklist   Group Other (Comment)  ([facility] Therapy [facility], Jewel Flor; Process Discussion )   Attendance Attended   Attendance Duration (min) Greater than 60   Interactions Interacted appropriately   Affect/Mood Appropriate;Calm   Goals Achieved Able to listen to others; Able to engage in interactions; Able to recieve feedback  (Engaged with materials;  Full participation )

## 2021-04-22 NOTE — PLAN OF CARE
Problem: Alteration in Thoughts and Perception  Goal: Verbalize thoughts and feelings  Description: Interventions:  - Promote a nonjudgmental and trusting relationship with the patient through active listening and therapeutic communication  - Assess patient's level of functioning, behavior and potential for risk  - Engage patient in 1 on 1 interactions  - Encourage patient to express fears, feelings, frustrations, and discuss symptoms    - [place] patient to reality, help patient recognize reality-based thinking   - Administer medications as ordered and assess for potential side effects  - Provide the patient education related to the signs and symptoms of the illness and desired effects of prescribed medications  Outcome: Progressing  Goal: Attend and participate in unit activities, including therapeutic, recreational, and educational groups  Description: Interventions:  -Encourage Visitation and family involvement in care  Outcome: Progressing     Problem: Ineffective Coping  Goal: Identifies healthy coping skills  Outcome: Progressing  Goal: Participates in unit activities  Description: Interventions:  - Provide therapeutic environment   - Provide required programming   - Redirect inappropriate behaviors   Outcome: Progressing     Writer met with Patient face to face and completed Peer Assessment  Patient states she has no forensic concerns but does need an eye exam appointment  Patient did have a [place] State ID however, claims she has lost it and needs a replacement  When asked if she feels she is ready to live in the community again she replied, "yes  Because my psychosis is gone and my medicine seems to be working ' during her stay on the [facility] she feels learning to deal better with her symptoms and exploring coping skills would be beneficially to her recovery    Patient was able to identify symptoms of her illness that hinder her living in the community as, increase in anxiety, increase in depression, racing thoughts and Si's  Patient listed coloring, talking to herself, singing, meditation and taking time alone to regroup as coping skills that assist her  Regarding staff, Patient's expectations are " just helping me with Therapy, coping skills and medication management  Patient see's her strengths as her belief in God/Barbara, commitment she has to her daughter and her decision making  LM's hopes and dreams for the future are to go to a group home, gain family support and being a part of her grandchildren's life  Concerning Independent Living Skills/Tasks, Patient is able to cook, grocery shop, budget her own money, tend to hygiene/self care and utilize public transportation  Patient feels she does need assistance with meeting people, making friends and socialization  Patient is aware she can work and still receive benefits and she has requested more information on a Mental health Advance Directive, which writer will provide  Patient was given a [employer] which she is currently working on

## 2021-04-22 NOTE — SOCIAL WORK
Met with patient while she was found pacing on unit, asking about her dental appointment  She reported feeling anxious and dealing with psychosis off and on, but has been able to manage it so far this morning and still wants to go to her dental appointment  Pt informed she cannot smoke cigarettes on her brief pass, against hospital policy  She was receptive to this  Pt then obtained insurance cards from security in preparation of going  Gave patient a therapeutic coloring activity to work on in the meantime if she wanted to while waiting

## 2021-04-22 NOTE — PROGRESS NOTES
04/22/21 0700   Activity/Group Checklist   Group   (Coffee Talk )   Attendance Attended   Attendance Duration (min) 46-60   Interactions Interacted appropriately   Affect/Mood Appropriate;Calm;Normal range   Goals Achieved Able to listen to others; Able to engage in interactions

## 2021-04-22 NOTE — CASE MANAGEMENT
[facility] housing referral packet completed and faxed to [facility] contacts, per patient request to get process started (CMP had stated during treatment team the referral is OK to initiate to get patient on wait lists, since every program has a wait list at this point in time)  Will follow up

## 2021-04-22 NOTE — NURSING NOTE
PRN Zyprexa apparently effective as Kathy Pau appears to sleep at this time  Will continue to monitor

## 2021-04-22 NOTE — PROGRESS NOTES
04/22/21 0900   Activity/Group Checklist   Group Community meeting   Attendance Attended   Attendance Duration (min) 16-30   Interactions Disorganized interaction   Affect/Mood Constricted   Goals Achieved Able to listen to others

## 2021-04-22 NOTE — NURSING NOTE
Patient is c/o dental pain from the extraction which she states is bottom right molar  Requested "pain medication " States pain is 9/10  She received Tylenol 975 mg at 1544 and pain went to # 3 within the hour  Patient asked for Zyprexa and stated it was ""because of the crazies"    She was not given this because she was due for her 1700 medications shortly She THEN asked for a Cogentin "because my legs are so stiff and tight" She received Cogentin  She denies AVH and SI as well  She went to bed around 1930 and had to be awakened for HS medications  She is on Amoxicillin post extraction

## 2021-04-22 NOTE — PROGRESS NOTES
04/22/21 1326   Team Meeting   Meeting Type Daily Rounds   Initial Conference Date 04/22/21   Patient/Family Present   Patient Present No   Patient's Family Present No       Daily Rounds Documentation  Team Members Participating  MD Rafa Bustamante, YUDY  [place] [id number], [id number] [facility]  Nithin Mendoza RN    Case reviewed  Discussed possibility of leg stiffness due to SSRI use  1 mg cogentin given for this issue yesterday  Woke up in middle of night for PRN to help sleep  C/O nightmares  3/5 groups   OP dental appointment today 11AM

## 2021-04-22 NOTE — PROGRESS NOTES
04/22/21 0859   Team Meeting   Meeting Type Daily Rounds   Initial Conference Date 04/21/21   Patient/Family Present   Patient Present No   Patient's Family Present No         Daily Rounds Documentation  Team Members Participating  Dr Kervin Nichols, LPN [place] [id number], [id number] [facility]  Zachery Stringer RN    Case reviewed  C/O nightmares "couldn't sleep" but observed sleeping by staff throughout night  Also reporting ongoing tooth pain inhibiting her eating  Dental appt scheduled for tomorrow across [facility]

## 2021-04-22 NOTE — NURSING NOTE
Rhina Zuleta awake in her room yelling  Requesting Zyprexa for agitation which was already given at 0421  Appears agitated with blunted affect  When asked for reason of agitation states I do not know I want to sleep  Medicated with Atarax 50 mg po for moderate agitation    Will monitor for effectiveness

## 2021-04-22 NOTE — PROGRESS NOTES
Progress Note - Behavioral Health     Anita Blakely 53 y o  female MRN: [id number]   Unit/Bed#: [facility] 135-91 Encounter: [id number]      Subjective:  Patient's chart reviewed and case discussed with the treatment team   The patient was seen in milieu today   Still complains of toothache and has appointment with dentist today at 11:00 a m  Reports mood otherwise has been better now though she was agitated early in the morning today after she woke up from sleep  Was found to be yelling  Requested Zyprexa for agitation  The patient stated that she had nightmares due to which she was upset  She also was given Atarax 50 mg  She currently reports feeling calm  She denies any SI or HI  Feels current medication has been very helpful  Denies any depression or anxiety  Denies any auditory or visual hallucination today  Though as per the staff the patient at times has been noticed to be preoccupied  Reports nightmare still present  anxiety has been better  Compliant with the medication and denies any side effect  the patient also had complained of stiff leg yesterday evening and was given Cogentin with good affect  I reviewed with the patient again  She reported she does not have stiffness of her legs today  She she was evaluated for serotonin syndrome and denied any other symptoms  Her vital signs were stable yesterday evening  The blood pressure was 122/68 and heart rate was 89 yesterday evening  Her blood pressure was slightly low in the morning today the patient denies any dizziness or lightheaded  She also was fully oriented to time place and person  As mention was agitated early in the morning but in response to nightmares  Does not seem to be confused or disoriented as per evaluation today  Will continue to monitor her for symptoms for serotonin syndrome or NMS                  ROS: all other systems are negative    Mental Status Evaluation:    Appearance:  casually dressed   Behavior:  normal Speech:  normal rate, normal volume, normal pitch   Mood:  normal   Affect:  constricted   Thought Process:  organized   Associations: intact associations   Thought Content:  normal   Perceptual Disturbances:  denies any auditory or visual hallucination today  Risk Potential: Suicidal ideation - None  Homicidal ideation - None  Potential for aggression - No   Sensorium:  oriented to person, place and time/date   Memory:  recent and remote memory grossly intact   Consciousness:  alert and awake   Attention: attention span and concentration are age appropriate   Insight:  age appropriate   Judgment: age appropriate   Gait/Station: normal gait/station   Motor Activity: no abnormal movements     Vital signs in last 24 hours:    Temp:  [97 5 °F (36 4 °C)-97 6 °F (36 4 °C)] 97 5 °F (36 4 °C)  HR:  [69-89] 69  Resp:  [16-17] 17  BP: (100-122)/(59-77) 100/59    Laboratory results: I have personally reviewed all pertinent laboratory/tests results  Progress Toward Goals: improving    Assessment/Plan    Mood had been labile today but better at this time  Mild anxiety at times but responds well to p r n  medication  Plan is to continue with the current medication at this time with no changes  Will continue monitor the patient for her mood, behavior, safety, sleep and response to meds  Will continue to provide her with group, milieu and occupational therapy  Principal Problem:    Schizoaffective disorder, bipolar type ([facility] )  Active Problems:    Post-traumatic stress disorder, chronic    Medical clearance for psychiatric admission    Mild intermittent asthma without complication    Ear problem, bilateral    Recommended Treatment:     Planned medication and treatment changes:     All current active medications have been reviewed  Encourage group therapy, milieu therapy and occupational therapy  Behavioral Health checks every 7 minutes  Continue current medications:  Current Facility-Administered Medications   Medication Dose Route Frequency Provider Last Rate    acetaminophen  650 mg Oral Q6H PRN Minh Lama, CRNP      acetaminophen  650 mg Oral Q4H PRN Minh Lama, CRNP      acetaminophen  975 mg Oral Q6H PRN Minh Lama, CRNP      al mag oxide-diphenhydramine-lidocaine viscous  10 mL Swish & Swallow Q4H PRN April Hays, CRNP      albuterol  2 puff Inhalation Q6H PRN Minh Lama, CRNP      benztropine  1 mg Intramuscular Q4H PRN Max 6/day Isis Lama, CRNP      benztropine  1 mg Oral Q4H PRN Max 6/day Isis Lama, CRNP      benztropine  1 mg Oral HS Isis Lama, CRNP      calcium carbonate  500 mg Oral TID PRN Minh Lama, CRNP      diphenhydrAMINE  25 mg Oral HS Isis Lama, CRNP      escitalopram  20 mg Oral Daily Isis Lama, CRNP      fenofibrate  145 mg Oral Daily Isis Lama, CRNP      gabapentin  600 mg Oral 4x Daily Isis Lama, CRNP      haloperidol  5 mg Oral Q6H PRN Minh Lama, CRNP      hydrOXYzine HCL  25 mg Oral Q6H PRN Max 4/day Isis Lama, CRNP      hydrOXYzine HCL  50 mg Oral Q6H PRN Max 4/day Isis Lama, CRNP      lidocaine  1 patch Topical Daily Isis Lama, CRNP      LORazepam  1 mg Intramuscular Q6H PRN Lucille Maher MD      LORazepam  0 5 mg Oral 4x Daily Lucille Maher MD      magnesium hydroxide  30 mL Oral Daily PRN Minh Lama, CRNP      nicotine  1 patch Transdermal Daily Harl Dopp, CRNP      OLANZapine  2 5 mg Oral Q8H PRN Harl Dopp, CRNP      OLANZapine  5 mg Oral Q3H PRN Harl Dopp, CRNP      OLANZapine  7 5 mg Oral Q3H PRN Harl Dopp, CRNP      OLANZapine  10 mg Intramuscular Q3H PRN Harl Dopp, CRNP      OLANZapine  5 mg Intramuscular Q3H PRN Harl Dopp, CRNP      OLANZapine  10 mg Oral BID Harl Dopp, CRNP      Pramox-PE-Glycerin-Petrolatum  1 application Rectal 4x Daily PRN Isis N Lodics, CRNP      prazosin  1 mg Oral HS JARED Hope         Risks / Benefits of Treatment:    Risks, benefits, and possible side effects of medications explained to patient and patient verbalizes understanding and agreement for treatment  Counseling / Coordination of Care:    Patient's progress discussed with staff in treatment team meeting  Medications, treatment progress and treatment plan reviewed with patient      Bharat Camilo MD 04/22/21

## 2021-04-22 NOTE — PROGRESS NOTES
04/22/21 1100   Activity/Group Checklist   Group   (IMR)   Attendance Did not attend   Attendance Duration (min) 46-60   Affect/Mood ASHLEY

## 2021-04-23 PROCEDURE — 99232 SBSQ HOSP IP/OBS MODERATE 35: CPT | Performed by: PSYCHIATRY & NEUROLOGY

## 2021-04-23 RX ORDER — OLANZAPINE 10 MG/1
10 TABLET ORAL 2 TIMES DAILY
Status: DISCONTINUED | OUTPATIENT
Start: 2021-04-23 | End: 2021-04-24

## 2021-04-23 RX ORDER — BENZTROPINE MESYLATE 1 MG/1
1 TABLET ORAL 2 TIMES DAILY
Status: DISCONTINUED | OUTPATIENT
Start: 2021-04-23 | End: 2021-07-29 | Stop reason: HOSPADM

## 2021-04-23 RX ADMIN — LIDOCAINE HYDROCHLORIDE 10 ML: 20 SOLUTION ORAL; TOPICAL at 15:35

## 2021-04-23 RX ADMIN — DIPHENHYDRAMINE HCL 25 MG: 25 TABLET ORAL at 21:02

## 2021-04-23 RX ADMIN — AMOXICILLIN 500 MG: 500 CAPSULE ORAL at 21:01

## 2021-04-23 RX ADMIN — BENZTROPINE MESYLATE 1 MG: 1 TABLET ORAL at 17:24

## 2021-04-23 RX ADMIN — GABAPENTIN 600 MG: 300 CAPSULE ORAL at 17:24

## 2021-04-23 RX ADMIN — OLANZAPINE 10 MG: 10 TABLET, FILM COATED ORAL at 21:01

## 2021-04-23 RX ADMIN — FENOFIBRATE 145 MG: 145 TABLET ORAL at 09:30

## 2021-04-23 RX ADMIN — LORAZEPAM 0.5 MG: 0.5 TABLET ORAL at 12:58

## 2021-04-23 RX ADMIN — GABAPENTIN 600 MG: 300 CAPSULE ORAL at 12:58

## 2021-04-23 RX ADMIN — ESCITALOPRAM OXALATE 20 MG: 10 TABLET ORAL at 09:34

## 2021-04-23 RX ADMIN — LORAZEPAM 0.5 MG: 0.5 TABLET ORAL at 09:30

## 2021-04-23 RX ADMIN — OLANZAPINE 10 MG: 10 TABLET, FILM COATED ORAL at 09:30

## 2021-04-23 RX ADMIN — LORAZEPAM 0.5 MG: 0.5 TABLET ORAL at 21:01

## 2021-04-23 RX ADMIN — BENZTROPINE MESYLATE 1 MG: 1 TABLET ORAL at 07:15

## 2021-04-23 RX ADMIN — GABAPENTIN 600 MG: 300 CAPSULE ORAL at 09:30

## 2021-04-23 RX ADMIN — NICOTINE 1 PATCH: 21 PATCH, EXTENDED RELEASE TRANSDERMAL at 09:32

## 2021-04-23 RX ADMIN — OLANZAPINE 5 MG: 5 TABLET, ORALLY DISINTEGRATING ORAL at 04:12

## 2021-04-23 RX ADMIN — ACETAMINOPHEN 650 MG: 325 TABLET ORAL at 04:12

## 2021-04-23 RX ADMIN — AMOXICILLIN 500 MG: 500 CAPSULE ORAL at 13:00

## 2021-04-23 RX ADMIN — MAGNESIUM HYDROXIDE 30 ML: 400 SUSPENSION ORAL at 14:53

## 2021-04-23 RX ADMIN — AMOXICILLIN 500 MG: 500 CAPSULE ORAL at 06:20

## 2021-04-23 RX ADMIN — LORAZEPAM 0.5 MG: 0.5 TABLET ORAL at 17:23

## 2021-04-23 RX ADMIN — PHENYTOIN 1 MG: 125 SUSPENSION ORAL at 21:01

## 2021-04-23 RX ADMIN — HYDROXYZINE HYDROCHLORIDE 50 MG: 50 TABLET, FILM COATED ORAL at 06:20

## 2021-04-23 RX ADMIN — GABAPENTIN 600 MG: 300 CAPSULE ORAL at 21:01

## 2021-04-23 RX ADMIN — LIDOCAINE 1 PATCH: 50 PATCH CUTANEOUS at 09:29

## 2021-04-23 NOTE — PLAN OF CARE
Problem: Nutrition/Hydration-ADULT  Goal: Nutrient/Hydration intake appropriate for improving, restoring or maintaining nutritional needs  Description: Monitor and assess patient's nutrition/hydration status for malnutrition  Collaborate with interdisciplinary team and initiate plan and interventions as ordered  Monitor patient's weight and dietary intake as ordered or per policy  Utilize nutrition screening tool and intervene as necessary  Determine patient's food preferences and provide high-protein, high-caloric foods as appropriate       INTERVENTIONS:  - Monitor oral intake, urinary output, labs, and treatment plans  - Assess nutrition and hydration status and recommend course of action  - Evaluate amount of meals eaten  - Assist patient with eating if necessary   - Allow adequate time for meals  - Recommend/ encourage appropriate diets, oral nutritional supplements, and vitamin/mineral supplements  - Order, calculate, and assess calorie counts as needed  - Recommend, monitor, and adjust tube feedings and TPN/PPN based on assessed needs  - Assess need for intravenous fluids  - Provide specific nutrition/hydration education as appropriate  - Include patient/family/caregiver in decisions related to nutrition  Outcome: Progressing     Problem: Alteration in Thoughts and Perception  Goal: Treatment Goal: Gain control of psychotic behaviors/thinking, reduce/eliminate presenting symptoms and demonstrate improved reality functioning upon discharge  Outcome: Progressing  Goal: Verbalize thoughts and feelings  Description: Interventions:  - Promote a nonjudgmental and trusting relationship with the patient through active listening and therapeutic communication  - Assess patient's level of functioning, behavior and potential for risk  - Engage patient in 1 on 1 interactions  - Encourage patient to express fears, feelings, frustrations, and discuss symptoms    - [place] patient to reality, help patient recognize reality-based thinking   - Administer medications as ordered and assess for potential side effects  - Provide the patient education related to the signs and symptoms of the illness and desired effects of prescribed medications  Outcome: Progressing  Goal: Refrain from acting on delusional thinking/internal stimuli  Description: Interventions:  - Monitor patient closely, per order   - Utilize least restrictive measures   - Set reasonable limits, give positive feedback for acceptable   - Administer medications as ordered and monitor of potential side effects  Outcome: Progressing  Goal: Agree to be compliant with medication regime, as prescribed and report medication side effects  Description: Interventions:  - Offer appropriate PRN medication and supervise ingestion; conduct AIMS, as needed   Outcome: Progressing  Goal: Recognize dysfunctional thoughts, communicate reality-based thoughts at the time of discharge  Description: Interventions:  - Provide medication and psycho-education to assist patient in compliance and developing insight into his/her illness   Outcome: Progressing  Goal: Complete daily ADLs, including personal hygiene independently, as able  Description: Interventions:  - Observe, teach, and assist patient with ADLS  - Monitor and promote a balance of rest/activity, with adequate nutrition and elimination   Outcome: Progressing     Problem: Ineffective Coping  Goal: Cooperates with admission process  Description: Interventions:   - Complete admission process  Outcome: Progressing  Goal: Identifies ineffective coping skills  Outcome: Progressing  Goal: Identifies healthy coping skills  Outcome: Progressing  Goal: Demonstrates healthy coping skills  Outcome: Progressing  Goal: Patient/Family participate in treatment and DC plans  Description: Interventions:  - Provide therapeutic environment  Outcome: Progressing  Goal: Patient/Family verbalizes awareness of resources  Outcome: Progressing  Goal: Understands least restrictive measures  Description: Interventions:  - Utilize least restrictive behavior  Outcome: Progressing  Goal: Free from restraint events  Description: - Utilize least restrictive measures   - Provide behavioral interventions   - Redirect inappropriate behaviors   Outcome: Progressing     Problem: Risk for Self Injury/Neglect  Goal: Treatment Goal: Remain safe during length of stay, learn and adopt new coping skills, and be free of self-injurious ideation, impulses and acts at the time of discharge  Outcome: Progressing  Goal: Verbalize thoughts and feelings  Description: Interventions:  - Assess and re-assess patient's lethality and potential for self-injury  - Engage patient in 1:1 interactions, daily, for a minimum of 15 minutes  - Encourage patient to express feelings, fears, frustrations, hopes  - Establish rapport/trust with patient   Outcome: Progressing  Goal: Refrain from harming self  Description: Interventions:  - Monitor patient closely, per order  - Develop a trusting relationship  - Supervise medication ingestion, monitor effects and side effects   Outcome: Progressing  Goal: Recognize maladaptive responses and adopt new coping mechanisms  Outcome: Progressing  Goal: Complete daily ADLs, including personal hygiene independently, as able  Description: Interventions:  - Observe, teach, and assist patient with ADLS  - Monitor and promote a balance of rest/activity, with adequate nutrition and elimination  Outcome: Progressing     Problem: Depression  Goal: Treatment Goal: Demonstrate behavioral control of depressive symptoms, verbalize feelings of improved mood/affect, and adopt new coping skills prior to discharge  Outcome: Progressing  Goal: Verbalize thoughts and feelings  Description: Interventions:  - Assess and re-assess patient's level of risk   - Engage patient in 1:1 interactions, daily, for a minimum of 15 minutes   - Encourage patient to express feelings, fears, frustrations, hopes   Outcome: Progressing  Goal: Refrain from harming self  Description: Interventions:  - Monitor patient closely, per order   - Supervise medication ingestion, monitor effects and side effects   Outcome: Progressing  Goal: Refrain from isolation  Description: Interventions:  - Develop a trusting relationship   - Encourage socialization   Outcome: Progressing  Goal: Refrain from self-neglect  Outcome: Progressing  Goal: Complete daily ADLs, including personal hygiene independently, as able  Description: Interventions:  - Observe, teach, and assist patient with ADLS  -  Monitor and promote a balance of rest/activity, with adequate nutrition and elimination   Outcome: Progressing     Problem: Anxiety  Goal: Anxiety is at manageable level  Description: Interventions:  - Assess and monitor patient's anxiety level  - Monitor for signs and symptoms (heart palpitations, chest pain, shortness of breath, headaches, nausea, feeling jumpy, restlessness, irritable, apprehensive)  - Collaborate with interdisciplinary team and initiate plan and interventions as ordered    - [place] patient to unit/surroundings  - Explain treatment plan  - Encourage participation in care  - Encourage verbalization of concerns/fears  - Identify coping mechanisms  - Assist in developing anxiety-reducing skills  - Administer/offer alternative therapies  - Limit or eliminate stimulants  Outcome: Progressing     Problem: Risk for Violence/Aggression Toward Others  Goal: Treatment Goal: Refrain from acts of violence/aggression during length of stay, and demonstrate improved impulse control at the time of discharge  Outcome: Progressing  Goal: Verbalize thoughts and feelings  Description: Interventions:  - Assess and re-assess patient's level of risk, every waking shift  - Engage patient in 1:1 interactions, daily, for a minimum of 15 minutes   - Allow patient to express feelings and frustrations in a safe and non-threatening manner - Establish rapport/trust with patient   Outcome: Progressing  Goal: Refrain from harming others  Outcome: Progressing  Goal: Refrain from destructive acts on the environment or property  Outcome: Progressing  Goal: Control angry outbursts  Description: Interventions:  - Monitor patient closely, per order  - Ensure early verbal de-escalation  - Monitor prn medication needs  - Set reasonable/therapeutic limits, outline behavioral expectations, and consequences   - Provide a non-threatening milieu, utilizing the least restrictive interventions   Outcome: Progressing  Goal: Identify appropriate positive anger management techniques  Description: Interventions:  - Offer anger management and coping skills groups   - Staff will provide positive feedback for appropriate anger control  Outcome: Progressing     Problem: Alteration in Orientation  Goal: Treatment Goal: Demonstrate a reduction of confusion and improved orientation to person, place, time and/or situation upon discharge, according to optimum baseline/ability  Outcome: Progressing  Goal: Interact with staff daily  Description: Interventions:  - Assess and re-assess patient's level of orientation  - Engage patient in 1 on 1 interactions, daily, for a minimum of 15 minutes   - Establish rapport/trust with patient   Outcome: Progressing  Goal: Express concerns related to confused thinking related to:  Description: Interventions:  - Encourage patient to express feelings, fears, frustrations, hopes  - Assign consistent caregivers   - [place]/re-orient patient as needed  - Allow comfort items, as appropriate  - Provide visual cues, signs, etc    Outcome: Progressing  Goal: Allow medical examinations, as recommended  Description: Interventions:  - Provide physical/neurological exams and/or referrals, per provider   Outcome: Progressing  Goal: Cooperate with recommended testing/procedures  Description: Interventions:  - Determine need for ancillary testing  - Observe for mental status changes  - Implement falls/precaution protocol   Outcome: Progressing  Goal: Complete daily ADLs, including personal hygiene independently, as able  Description: Interventions:  - Observe, teach, and assist patient with ADLS  Outcome: Progressing     Problem: Individualized Interventions  Goal: Patient will verbalize appropriate use of telephone within 5 days  Description: Interventions:  - Treatment team to determine use of supervised phone privileges   Outcome: Progressing  Goal: Patient will verbalize need for hospitalization and will no longer attempt elopement within 5 days  Description: Interventions:  - Ongoing education to help patient understand need for hospitalization  Outcome: Progressing  Goal: Patient will recognize inappropriate behaviors and develop alternative behaviors within 5 days  Description: Interventions:  - Patient in collaboration with Treatment Team will develop a behavior management plan to help identify effective coping skills to deal with stressors  Outcome: Progressing

## 2021-04-23 NOTE — NURSING NOTE
Patient is calm and cooperative  She complains that PRNs received this morning were ineffective  She requested more PRN medication but was agreeable to psychotherapeutic interventions and scheduled medication  She c/o drowsiness in the evening and requested medication changes were were granted by Dr Gustavo Monsivais

## 2021-04-23 NOTE — PLAN OF CARE
Problem: Alteration in Thoughts and Perception  Goal: Treatment Goal: Gain control of psychotic behaviors/thinking, reduce/eliminate presenting symptoms and demonstrate improved reality functioning upon discharge  Outcome: Not Progressing  Goal: Verbalize thoughts and feelings  Description: Interventions:  - Promote a nonjudgmental and trusting relationship with the patient through active listening and therapeutic communication  - Assess patient's level of functioning, behavior and potential for risk  - Engage patient in 1 on 1 interactions  - Encourage patient to express fears, feelings, frustrations, and discuss symptoms    - [place] patient to reality, help patient recognize reality-based thinking   - Administer medications as ordered and assess for potential side effects  - Provide the patient education related to the signs and symptoms of the illness and desired effects of prescribed medications  Outcome: Progressing  Goal: Refrain from acting on delusional thinking/internal stimuli  Description: Interventions:  - Monitor patient closely, per order   - Utilize least restrictive measures   - Set reasonable limits, give positive feedback for acceptable   - Administer medications as ordered and monitor of potential side effects  Outcome: Not Progressing  Goal: Agree to be compliant with medication regime, as prescribed and report medication side effects  Description: Interventions:  - Offer appropriate PRN medication and supervise ingestion; conduct AIMS, as needed   Outcome: Progressing  Goal: Attend and participate in unit activities, including therapeutic, recreational, and educational groups  Description: Interventions:  -Encourage Visitation and family involvement in care  Outcome: Progressing  Goal: Recognize dysfunctional thoughts, communicate reality-based thoughts at the time of discharge  Description: Interventions:  - Provide medication and psycho-education to assist patient in compliance and developing insight into his/her illness   Outcome: Not Progressing  Goal: Complete daily ADLs, including personal hygiene independently, as able  Description: Interventions:  - Observe, teach, and assist patient with ADLS  - Monitor and promote a balance of rest/activity, with adequate nutrition and elimination   Outcome: Progressing     Problem: Ineffective Coping  Goal: Cooperates with admission process  Description: Interventions:   - Complete admission process  Outcome: Progressing  Goal: Identifies ineffective coping skills  Outcome: Progressing  Goal: Identifies healthy coping skills  Outcome: Progressing  Goal: Demonstrates healthy coping skills  Outcome: Progressing  Goal: Participates in unit activities  Description: Interventions:  - Provide therapeutic environment   - Provide required programming   - Redirect inappropriate behaviors   Outcome: Progressing  Goal: Patient/Family participate in treatment and DC plans  Description: Interventions:  - Provide therapeutic environment  Outcome: Progressing  Goal: Patient/Family verbalizes awareness of resources  Outcome: Progressing  Goal: Understands least restrictive measures  Description: Interventions:  - Utilize least restrictive behavior  Outcome: Progressing  Goal: Free from restraint events  Description: - Utilize least restrictive measures   - Provide behavioral interventions   - Redirect inappropriate behaviors   Outcome: Progressing     Problem: Risk for Self Injury/Neglect  Goal: Treatment Goal: Remain safe during length of stay, learn and adopt new coping skills, and be free of self-injurious ideation, impulses and acts at the time of discharge  Outcome: Progressing  Goal: Verbalize thoughts and feelings  Description: Interventions:  - Assess and re-assess patient's lethality and potential for self-injury  - Engage patient in 1:1 interactions, daily, for a minimum of 15 minutes  - Encourage patient to express feelings, fears, frustrations, hopes  - Establish rapport/trust with patient   Outcome: Progressing  Goal: Refrain from harming self  Description: Interventions:  - Monitor patient closely, per order  - Develop a trusting relationship  - Supervise medication ingestion, monitor effects and side effects   Outcome: Progressing  Goal: Attend and participate in unit activities, including therapeutic, recreational, and educational groups  Description: Interventions:  - Provide therapeutic and educational activities daily, encourage attendance and participation, and document same in the medical record  - Obtain collateral information, encourage visitation and family involvement in care   Outcome: Progressing  Goal: Recognize maladaptive responses and adopt new coping mechanisms  Outcome: Not Progressing  Goal: Complete daily ADLs, including personal hygiene independently, as able  Description: Interventions:  - Observe, teach, and assist patient with ADLS  - Monitor and promote a balance of rest/activity, with adequate nutrition and elimination  Outcome: Progressing     Problem: Depression  Goal: Treatment Goal: Demonstrate behavioral control of depressive symptoms, verbalize feelings of improved mood/affect, and adopt new coping skills prior to discharge  Outcome: Progressing  Goal: Verbalize thoughts and feelings  Description: Interventions:  - Assess and re-assess patient's level of risk   - Engage patient in 1:1 interactions, daily, for a minimum of 15 minutes   - Encourage patient to express feelings, fears, frustrations, hopes   Outcome: Progressing  Goal: Refrain from harming self  Description: Interventions:  - Monitor patient closely, per order   - Supervise medication ingestion, monitor effects and side effects   Outcome: Progressing  Goal: Refrain from isolation  Description: Interventions:  - Develop a trusting relationship   - Encourage socialization   Outcome: Progressing  Goal: Refrain from self-neglect  Outcome: Progressing  Goal: Attend and participate in unit activities, including therapeutic, recreational, and educational groups  Description: Interventions:  - Provide therapeutic and educational activities daily, encourage attendance and participation, and document same in the medical record   Outcome: Progressing  Goal: Complete daily ADLs, including personal hygiene independently, as able  Description: Interventions:  - Observe, teach, and assist patient with ADLS  -  Monitor and promote a balance of rest/activity, with adequate nutrition and elimination   Outcome: Progressing     Problem: Anxiety  Goal: Anxiety is at manageable level  Description: Interventions:  - Assess and monitor patient's anxiety level  - Monitor for signs and symptoms (heart palpitations, chest pain, shortness of breath, headaches, nausea, feeling jumpy, restlessness, irritable, apprehensive)  - Collaborate with interdisciplinary team and initiate plan and interventions as ordered    - [place] patient to unit/surroundings  - Explain treatment plan  - Encourage participation in care  - Encourage verbalization of concerns/fears  - Identify coping mechanisms  - Assist in developing anxiety-reducing skills  - Administer/offer alternative therapies  - Limit or eliminate stimulants  Outcome: Not Progressing     Problem: Risk for Violence/Aggression Toward Others  Goal: Treatment Goal: Refrain from acts of violence/aggression during length of stay, and demonstrate improved impulse control at the time of discharge  Outcome: Progressing  Goal: Verbalize thoughts and feelings  Description: Interventions:  - Assess and re-assess patient's level of risk, every waking shift  - Engage patient in 1:1 interactions, daily, for a minimum of 15 minutes   - Allow patient to express feelings and frustrations in a safe and non-threatening manner   - Establish rapport/trust with patient   Outcome: Progressing  Goal: Refrain from harming others  Outcome: Progressing  Goal: Refrain from destructive acts on the environment or property  Outcome: Progressing  Goal: Control angry outbursts  Description: Interventions:  - Monitor patient closely, per order  - Ensure early verbal de-escalation  - Monitor prn medication needs  - Set reasonable/therapeutic limits, outline behavioral expectations, and consequences   - Provide a non-threatening milieu, utilizing the least restrictive interventions   Outcome: Not Progressing  Goal: Attend and participate in unit activities, including therapeutic, recreational, and educational groups  Description: Interventions:  - Provide therapeutic and educational activities daily, encourage attendance and participation, and document same in the medical record   Outcome: Progressing  Goal: Identify appropriate positive anger management techniques  Description: Interventions:  - Offer anger management and coping skills groups   - Staff will provide positive feedback for appropriate anger control  Outcome: Not Progressing     Problem: Alteration in Orientation  Goal: Treatment Goal: Demonstrate a reduction of confusion and improved orientation to person, place, time and/or situation upon discharge, according to optimum baseline/ability  Outcome: Progressing  Goal: Interact with staff daily  Description: Interventions:  - Assess and re-assess patient's level of orientation  - Engage patient in 1 on 1 interactions, daily, for a minimum of 15 minutes   - Establish rapport/trust with patient   Outcome: Progressing  Goal: Express concerns related to confused thinking related to:  Description: Interventions:  - Encourage patient to express feelings, fears, frustrations, hopes  - Assign consistent caregivers   - [place]/re-orient patient as needed  - Allow comfort items, as appropriate  - Provide visual cues, signs, etc    Outcome: Not Progressing  Goal: Allow medical examinations, as recommended  Description: Interventions:  - Provide physical/neurological exams and/or referrals, per provider   Outcome: Progressing  Goal: Cooperate with recommended testing/procedures  Description: Interventions:  - Determine need for ancillary testing  - Observe for mental status changes  - Implement falls/precaution protocol   Outcome: Progressing  Goal: Attend and participate in unit activities, including therapeutic, recreational, and educational groups  Description: Interventions:  - Provide therapeutic and educational activities daily, encourage attendance and participation, and document same in the medical record   - Provide appropriate opportunities for reminiscence   - Provide a consistent daily routine   - Encourage family contact/visitation   Outcome: Progressing  Goal: Complete daily ADLs, including personal hygiene independently, as able  Description: Interventions:  - Observe, teach, and assist patient with ADLS  Outcome: Progressing     Problem: Individualized Interventions  Goal: Patient will verbalize appropriate use of telephone within 5 days  Description: Interventions:  - Treatment team to determine use of supervised phone privileges   Outcome: Progressing  Goal: Patient will verbalize need for hospitalization and will no longer attempt elopement within 5 days  Description: Interventions:  - Ongoing education to help patient understand need for hospitalization  Outcome: Progressing  Goal: Patient will recognize inappropriate behaviors and develop alternative behaviors within 5 days  Description: Interventions:  - Patient in collaboration with Treatment Team will develop a behavior management plan to help identify effective coping skills to deal with stressors  Outcome: Not Progressing

## 2021-04-23 NOTE — PROGRESS NOTES
04/23/21 0700   Activity/Group Checklist   Group   (Coffee Talk )   Attendance Attended   Attendance Duration (min) 46-60   Interactions Interacted appropriately   Affect/Mood Appropriate;Calm   Goals Achieved Identified feelings; Able to engage in interactions; Able to listen to others

## 2021-04-23 NOTE — PROGRESS NOTES
04/23/21 0900   Activity/Group Checklist   Group Community meeting   Attendance Attended   Attendance Duration (min) 16-30   Interactions Interacted appropriately   Affect/Mood Appropriate;Calm;Normal range   Goals Achieved Able to listen to others; Able to engage in interactions

## 2021-04-23 NOTE — NURSING NOTE
Patient is cooperative and visible today  She did leave the unit at 1050 to go to an appt at the dental clinic across the street  She then returned at 1155  Patient did have a right back molar extraction  She is numb and was given soft foods for her first meal after the extraction  Patient is more cooperative today  No outbursts of yelling noted  She has denied any pain for this writer  Continue to monitor

## 2021-04-23 NOTE — PROGRESS NOTES
04/23/21 1100   Activity/Group Checklist   Group Other (Comment)  ([facility]: Building New Habits)   Attendance Attended   Attendance Duration (min) 46-60   Interactions Interacted appropriately   Affect/Mood Appropriate   Goals Achieved Other (Comment)  (Identified new habit related to her recovery)

## 2021-04-23 NOTE — NURSING NOTE
Pt sleeping beginning of shift  Woke up @ 9794 c/o right jaw pain d/t tooth extraction & anxiety  Tylenol 650 mg given for 4/10 pain & pt requested both zyprexa & ativan for her anxiety  Informed pt only 1 can be given & zyprexa given @ that time  Pt remained awake constantly asking for the time  @ 2395 pt stated she was having a psychotic episode & requested a PRN  Atarax 50 mg given   Resting in room @ present, will continue to monitor

## 2021-04-23 NOTE — PROGRESS NOTES
Progress Note - Behavioral Health     Yeni Sizer 53 y o  female MRN: [id number]   [facility] Encounter: [id number]      Subjective:  Patient's chart reviewed and case discussed with the treatment team   The patient was seen in milieu today  Patient had just came out of the shower and reports she is feeling well  She though reported she feels stiffness again in her legs today and had taken Cogentin with good affect  The patient though denied any other symptoms for serotonin syndrome or NMS  Her vital signs were stable with blood pressure is slightly 102/51 today morning  Heart rate was 64 per minute  She was calm and cooperative  Fully oriented and did not exhibited any signs of confusion or disorientation  Denies any stiffness on any other part of the body  Denied any auditory or visual hallucinations  Denies any SI or HI  Reports mood has been better  Reports compliant with the medication and denies any other concerns other than mentioned  The patient wanted Cogentin twice a day which I was agreeable with  As per the nurse the patient complained of dental pain last night from the extraction and got Tylenol  She also took p r n  for Cogentin  Had complained of pain again early in the morning today and was given Tylenol  She also was given Zyprexa p r n  the morning as she was complaining of anxiety as per the nurse  Later she complained of having a psychotic episode and given Atarax  When I checked with her during the appointment today she denied any psychotic symptom but complained of anxiety early in the morning  Had attended half of the groups yesterday                ROS: all other systems are negative    Mental Status Evaluation:    Appearance:  casually dressed   Behavior:  normal   Speech:  normal rate, normal volume, normal pitch   Mood:  normal   Affect:  constricted   Thought Process:  organized   Associations: intact associations   Thought Content:  normal   Perceptual Disturbances:  denies any auditory or visual hallucination today  Risk Potential: Suicidal ideation - None  Homicidal ideation - None  Potential for aggression - No   Sensorium:  oriented to person, place and time/date   Memory:  recent and remote memory grossly intact   Consciousness:  alert and awake   Attention: attention span and concentration are age appropriate   Insight:  age appropriate   Judgment: age appropriate   Gait/Station: normal gait/station   Motor Activity: no abnormal movements     Vital signs in last 24 hours:    Temp:  [97 8 °F (36 6 °C)-99 °F (37 2 °C)] 97 8 °F (36 6 °C)  HR:  [62-90] 64  Resp:  [17] 17  BP: (102-116)/(51-69) 102/51    Laboratory results: I have personally reviewed all pertinent laboratory/tests results  Progress Toward Goals: improving    Assessment/Plan    Mood had been better today  Plan is to continue with the current medication at this time  Except changing Cogentin to 1 mg twice a day for patient concerns about muscle stiffness  Also on the patient requests Zyprexa in the evening will be switched to 9:00 p m  from 6:00 p m     Will continue monitor the patient for her mood, behavior, safety, sleep and response to meds  Will continue to provide her with group, milieu and occupational therapy  Principal Problem:    Schizoaffective disorder, bipolar type ([facility] [address] )  Active Problems:    Post-traumatic stress disorder, chronic    Medical clearance for psychiatric admission    Mild intermittent asthma without complication    Ear problem, bilateral    Recommended Treatment:     Planned medication and treatment changes:     All current active medications have been reviewed  Encourage group therapy, milieu therapy and occupational therapy  Behavioral Health checks every 7 minutes  Continue current medications:  Current Facility-Administered Medications   Medication Dose Route Frequency Provider Last Rate    acetaminophen  650 mg Oral Q6H PRN JARED Jara      acetaminophen  650 mg Oral Q4H PRN Lindsey Side Lodics, CRNP      acetaminophen  975 mg Oral Q6H PRN Lindsey Side Lodics, CRNP      al mag oxide-diphenhydramine-lidocaine viscous  10 mL Swish & Swallow Q4H PRN Melven Dung, CRNP      albuterol  2 puff Inhalation Q6H PRN Lindsey Side Lodics, CRNP      amoxicillin  500 mg Oral Q8H [address] Judi Foley MD      benztropine  1 mg Intramuscular Q4H PRN Max 6/day Isis N Lodics, CRNP      benztropine  1 mg Oral Q4H PRN Max 6/day Isis N Lodics, CRNP      benztropine  1 mg Oral HS Isis N Lodics, CRNP      calcium carbonate  500 mg Oral TID PRN Lindsey Side Lodics, CRNP      diphenhydrAMINE  25 mg Oral HS Isis N Lodics, CRNP      escitalopram  20 mg Oral Daily Isis N Lodics, CRNP      fenofibrate  145 mg Oral Daily Isis N Lodics, CRNP      gabapentin  600 mg Oral 4x Daily Isis N Lodics, CRNP      haloperidol  5 mg Oral Q6H PRN Lindsey Side Lodics, CRNP      hydrOXYzine HCL  25 mg Oral Q6H PRN Max 4/day Isis N Lodics, CRNP      hydrOXYzine HCL  50 mg Oral Q6H PRN Max 4/day Isis N Lodics, CRNP      lidocaine  1 patch Topical Daily Iiss N Lodics, CRNP      LORazepam  1 mg Intramuscular Q6H PRN Dmitriy Ornelas MD      LORazepam  0 5 mg Oral 4x Daily Dmitriy Ornelas MD      magnesium hydroxide  30 mL Oral Daily PRN Lindsey Side Lodics, CRNP      nicotine  1 patch Transdermal Daily Tamica Reuben, CRNP      OLANZapine  2 5 mg Oral Q8H PRN Tamica Reuben, CRNP      OLANZapine  5 mg Oral Q3H PRN Tamica Reuben, CRNP      OLANZapine  7 5 mg Oral Q3H PRN Tamica Reuben, CRNP      OLANZapine  10 mg Intramuscular Q3H PRN Tamica Reuben, CRNP      OLANZapine  5 mg Intramuscular Q3H PRN Tamica Reuben, CRNP      OLANZapine  10 mg Oral BID Tamica Reuben, CRNP      Pramox-PE-Glycerin-Petrolatum  1 application Rectal 4x Daily PRN Isis N Lodics, CRNP      prazosin  1 mg Oral HS JARED Duran         Risks / Benefits of Treatment:    Risks, benefits, and possible side effects of medications explained to patient and patient verbalizes understanding and agreement for treatment  Counseling / Coordination of Care:    Patient's progress discussed with staff in treatment team meeting  Medications, treatment progress and treatment plan reviewed with patient      Ronni Stahl MD 04/23/21

## 2021-04-24 PROCEDURE — 99232 SBSQ HOSP IP/OBS MODERATE 35: CPT | Performed by: PSYCHIATRY & NEUROLOGY

## 2021-04-24 RX ORDER — OLANZAPINE 10 MG/1
10 TABLET ORAL
Status: DISCONTINUED | OUTPATIENT
Start: 2021-04-24 | End: 2021-07-29 | Stop reason: HOSPADM

## 2021-04-24 RX ADMIN — GABAPENTIN 600 MG: 300 CAPSULE ORAL at 21:21

## 2021-04-24 RX ADMIN — OLANZAPINE 10 MG: 10 TABLET, FILM COATED ORAL at 08:43

## 2021-04-24 RX ADMIN — DIPHENHYDRAMINE HCL 25 MG: 25 TABLET ORAL at 21:21

## 2021-04-24 RX ADMIN — PSYLLIUM HUSK 1 PACKET: 3.4 POWDER ORAL at 10:13

## 2021-04-24 RX ADMIN — LORAZEPAM 0.5 MG: 0.5 TABLET ORAL at 21:21

## 2021-04-24 RX ADMIN — OLANZAPINE 10 MG: 10 TABLET, FILM COATED ORAL at 21:21

## 2021-04-24 RX ADMIN — AMOXICILLIN 500 MG: 500 CAPSULE ORAL at 14:12

## 2021-04-24 RX ADMIN — OLANZAPINE 5 MG: 5 TABLET, ORALLY DISINTEGRATING ORAL at 14:14

## 2021-04-24 RX ADMIN — CALCIUM CARBONATE (ANTACID) CHEW TAB 500 MG 500 MG: 500 CHEW TAB at 10:05

## 2021-04-24 RX ADMIN — GABAPENTIN 600 MG: 300 CAPSULE ORAL at 08:41

## 2021-04-24 RX ADMIN — BENZTROPINE MESYLATE 1 MG: 1 TABLET ORAL at 17:23

## 2021-04-24 RX ADMIN — GABAPENTIN 600 MG: 300 CAPSULE ORAL at 12:14

## 2021-04-24 RX ADMIN — LORAZEPAM 0.5 MG: 0.5 TABLET ORAL at 08:41

## 2021-04-24 RX ADMIN — PHENYTOIN 1 MG: 125 SUSPENSION ORAL at 21:20

## 2021-04-24 RX ADMIN — ESCITALOPRAM OXALATE 20 MG: 10 TABLET ORAL at 08:46

## 2021-04-24 RX ADMIN — GLYCERIN, PETROLATUM, PHENYLEPHRINE HCL, PRAMOXINE HCL 1 APPLICATION: 144; 2.5; 10; 15 CREAM TOPICAL at 21:48

## 2021-04-24 RX ADMIN — AMOXICILLIN 500 MG: 500 CAPSULE ORAL at 21:24

## 2021-04-24 RX ADMIN — LIDOCAINE 1 PATCH: 50 PATCH CUTANEOUS at 08:47

## 2021-04-24 RX ADMIN — BENZTROPINE MESYLATE 1 MG: 1 TABLET ORAL at 08:45

## 2021-04-24 RX ADMIN — GABAPENTIN 600 MG: 300 CAPSULE ORAL at 17:23

## 2021-04-24 RX ADMIN — FENOFIBRATE 145 MG: 145 TABLET ORAL at 08:41

## 2021-04-24 RX ADMIN — NICOTINE 1 PATCH: 21 PATCH, EXTENDED RELEASE TRANSDERMAL at 08:45

## 2021-04-24 RX ADMIN — LORAZEPAM 0.5 MG: 0.5 TABLET ORAL at 12:14

## 2021-04-24 RX ADMIN — AMOXICILLIN 500 MG: 500 CAPSULE ORAL at 06:18

## 2021-04-24 RX ADMIN — LORAZEPAM 0.5 MG: 0.5 TABLET ORAL at 17:22

## 2021-04-24 NOTE — NURSING NOTE
Leopoldo Pies maintained on ongoing SAFE precaution without incident on this shift  She is partially visible with irritable edge  She attended 2/2 evening groups  She is persistent and demanding to have her meds earlier than  Prescribe  This writer explained to her that scheduled meds are not given until 9:00pm not 7:00pm   Slight irritability noted, mumbling underneath her breath but no yelling out  Continue to be compliant with her meds without prompting  On ABT Amoxicillin 500mg for post oral surgery for prophylaxes with no adverse reaction  Denies any depression or anxiety  No overt delusion or a/t/v hallucination noted  Will continue to monitor

## 2021-04-24 NOTE — PROGRESS NOTES
Progress Note - Behavioral Health   Rubens Rider 53 y o  female MRN: [id number]  [facility] Encounter: [id number]    Assessment/Plan   Principal Problem:    Schizoaffective disorder, bipolar type ([address] )  Active Problems:    Post-traumatic stress disorder, chronic    Medical clearance for psychiatric admission    Mild intermittent asthma without complication    Ear problem, bilateral    Per report: the patient continues to be med seeking  She is impatient  She is asking for Milk of magnesia and prune juice  She has a history of anorexia and is somewhat bowel perseverative  Per patient: the patient endorsed still having hallucinations with taste and touch  This is better then prior to the hospital " this is still freaking me out and it makes me sick to my stomach "  She denied paranoia  She asked about ECT for her symptoms  The hallucinations make her scared, anxious, and fearful  This medication is making her hallucinations slowly going away  She is sleeping well at night  She is eating well  She sometimes gets stiff legs and stiff jaw at times  She denied racing thoughts  She denied SI/HI       Behavior over the last 24 hours:  unchanged  Sleep: normal  Appetite: normal  Medication side effects: occasional stiff calves  ROS: no complaints    Mental Status Evaluation:  Appearance:  older than stated age   Behavior:  restless and fidgety   Speech:  loud and pressured   Mood:  irritable   Affect:  constricted   Thought Process:  loose associations, perserverative and tangential   Thought Content:  delusions  somatic   Perceptual Disturbances: tactile and gustatory   Risk Potential: Suicidal Ideations none  Homicidal Ideations none  Potential for Aggression No   Sensorium:  person, place and time/date   Memory:  recent and remote memory grossly intact   Consciousness:  alert and awake    Attention: attention span appeared shorter than expected for age   Insight:  limited   Judgment: limited   Gait/Station: normal gait/station and normal balance   Motor Activity: no abnormal movements     Progress Toward Goals: "I want to stat in groups and not go psychotic and not have any outbursts  Recommended Treatment: Continue with group therapy, milieu therapy and occupational therapy  Risks, benefits and possible side effects of Medications:   Risks, benefits, and possible side effects of medications explained to patient and patient verbalizes understanding        Medications:   all current active meds have been reviewed, continue current psychiatric medications and current meds:   Current Facility-Administered Medications   Medication Dose Route Frequency    acetaminophen (TYLENOL) tablet 650 mg  650 mg Oral Q6H PRN    acetaminophen (TYLENOL) tablet 650 mg  650 mg Oral Q4H PRN    acetaminophen (TYLENOL) tablet 975 mg  975 mg Oral Q6H PRN    al mag oxide-diphenhydramine-lidocaine viscous (MAGIC MOUTHWASH) suspension 10 mL  10 mL Swish & Swallow Q4H PRN    albuterol (PROVENTIL HFA,VENTOLIN HFA) inhaler 2 puff  2 puff Inhalation Q6H PRN    amoxicillin (AMOXIL) capsule 500 mg  500 mg Oral Q8H [address]    benztropine (COGENTIN) injection 1 mg  1 mg Intramuscular Q4H PRN Max 6/day    benztropine (COGENTIN) tablet 1 mg  1 mg Oral Q4H PRN Max 6/day    benztropine (COGENTIN) tablet 1 mg  1 mg Oral BID    calcium carbonate (TUMS) chewable tablet 500 mg  500 mg Oral TID PRN    diphenhydrAMINE (BENADRYL) tablet 25 mg  25 mg Oral HS    escitalopram (LEXAPRO) tablet 20 mg  20 mg Oral Daily    fenofibrate (TRICOR) tablet 145 mg  145 mg Oral Daily    gabapentin (NEURONTIN) capsule 600 mg  600 mg Oral 4x Daily    haloperidol (HALDOL) tablet 5 mg  5 mg Oral Q6H PRN    hydrOXYzine HCL (ATARAX) tablet 25 mg  25 mg Oral Q6H PRN Max 4/day    hydrOXYzine HCL (ATARAX) tablet 50 mg  50 mg Oral Q6H PRN Max 4/day    lidocaine (LIDODERM) 5 % patch 1 patch  1 patch Topical Daily    LORazepam (ATIVAN) injection 1 mg  1 mg Intramuscular Q6H PRN    LORazepam (ATIVAN) tablet 0 5 mg  0 5 mg Oral 4x Daily    magnesium hydroxide (MILK OF MAGNESIA) oral suspension 30 mL  30 mL Oral Daily PRN    nicotine (NICODERM CQ) 21 mg/24 hr TD 24 hr patch 1 patch  1 patch Transdermal Daily    OLANZapine (ZyPREXA ZYDIS) dispersible tablet 2 5 mg  2 5 mg Oral Q8H PRN    OLANZapine (ZyPREXA ZYDIS) dispersible tablet 5 mg  5 mg Oral Q3H PRN    OLANZapine (ZyPREXA ZYDIS) dispersible tablet 7 5 mg  7 5 mg Oral Q3H PRN    OLANZapine (ZyPREXA) IM injection 10 mg  10 mg Intramuscular Q3H PRN    OLANZapine (ZyPREXA) IM injection 5 mg  5 mg Intramuscular Q3H PRN    OLANZapine (ZyPREXA) tablet 10 mg  10 mg Oral BID    Pramox-PE-Glycerin-Petrolatum (PREPARATION H MAX) 1-0 25-14 4-15 % rectal cream 1 application  1 application Rectal 4x Daily PRN    prazosin (MINIPRESS) capsule 1 mg  1 mg Oral HS    psyllium (METAMUCIL) 1 packet  1 packet Oral Daily    Increase morning Zyprexa dose to 15mg and continue night time dose of 10mg  Labs: I have personally reviewed all pertinent laboratory/tests results     Most Recent Labs:   Lab Results   Component Value Date    WBC 6 80 04/09/2021    RBC 4 32 04/09/2021    HGB 13 0 04/09/2021    HCT 39 5 04/09/2021     04/09/2021    RDW 14 3 04/09/2021    NEUTROABS 7 10 (H) 03/16/2021    SODIUM 139 04/09/2021    K 5 0 04/09/2021     04/09/2021    CO2 29 04/09/2021    BUN 15 04/09/2021    CREATININE 0 58 (L) 04/09/2021    GLUC 91 04/09/2021    GLUF 91 04/09/2021    CALCIUM 9 6 04/09/2021    AST 21 04/09/2021    ALT 17 04/09/2021    ALKPHOS 60 04/09/2021    TP 6 9 04/09/2021    ALB 4 0 04/09/2021    TBILI 0 20 04/09/2021    CHOLESTEROL 300 (H) 03/31/2021    HDL 57 03/31/2021    TRIG 658 (H) 03/31/2021    LDLCALC  03/31/2021      Comment:      Calculated LDL invalid, triglycerides >400 mg/dl    NONHDLC 147 08/23/2020    AMMONIA 28 10/27/2017    [id number] 3 810 04/09/2021    FREET4 0 89 03/24/2016 PREGSERUM Negative 10/21/2019    HCG <2 00 04/10/2014    RPR Non-Reactive 03/31/2021    HGBA1C 5 0 08/06/2019    EAG 97 08/06/2019       Counseling / Coordination of Care  Total floor / unit time spent today 15 minutes  Greater than 50% of total time was spent with the patient and / or family counseling and / or coordination of care  A description of the counseling / coordination of care: staff and patient        This note was not shared with the patient due to reasonable likelihood of causing patient harm

## 2021-04-24 NOTE — NURSING NOTE
Patient continues to be unstable in her behavior   Especially when she gets off the telephone  She screams on top  Of her lungs    For no reason  The doctor increased her Kiko Breaker today

## 2021-04-24 NOTE — PLAN OF CARE
Problem: Alteration in Thoughts and Perception  Goal: Agree to be compliant with medication regime, as prescribed and report medication side effects  Description: Interventions:  - Offer appropriate PRN medication and supervise ingestion; conduct AIMS, as needed   Outcome: Progressing  Goal: Attend and participate in unit activities, including therapeutic, recreational, and educational groups  Description: Interventions:  -Encourage Visitation and family involvement in care  Outcome: Not Progressing     Problem: Ineffective Coping  Goal: Participates in unit activities  Description: Interventions:  - Provide therapeutic environment   - Provide required programming   - Redirect inappropriate behaviors   Outcome: Not Progressing  Goal: Understands least restrictive measures  Description: Interventions:  - Utilize least restrictive behavior  Outcome: Progressing  Goal: Free from restraint events  Description: - Utilize least restrictive measures   - Provide behavioral interventions   - Redirect inappropriate behaviors   Outcome: Progressing     Problem: Risk for Self Injury/Neglect  Goal: Verbalize thoughts and feelings  Description: Interventions:  - Assess and re-assess patient's lethality and potential for self-injury  - Engage patient in 1:1 interactions, daily, for a minimum of 15 minutes  - Encourage patient to express feelings, fears, frustrations, hopes  - Establish rapport/trust with patient   Outcome: Progressing  Goal: Refrain from harming self  Description: Interventions:  - Monitor patient closely, per order  - Develop a trusting relationship  - Supervise medication ingestion, monitor effects and side effects   Outcome: Progressing     Problem: Depression  Goal: Verbalize thoughts and feelings  Description: Interventions:  - Assess and re-assess patient's level of risk   - Engage patient in 1:1 interactions, daily, for a minimum of 15 minutes   - Encourage patient to express feelings, fears, frustrations, hopes   Outcome: Progressing  Goal: Refrain from harming self  Description: Interventions:  - Monitor patient closely, per order   - Supervise medication ingestion, monitor effects and side effects   Outcome: Progressing  Goal: Refrain from isolation  Description: Interventions:  - Develop a trusting relationship   - Encourage socialization   Outcome: Progressing  Goal: Refrain from self-neglect  Outcome: Progressing     Problem: Risk for Violence/Aggression Toward Others  Goal: Verbalize thoughts and feelings  Description: Interventions:  - Assess and re-assess patient's level of risk, every waking shift  - Engage patient in 1:1 interactions, daily, for a minimum of 15 minutes   - Allow patient to express feelings and frustrations in a safe and non-threatening manner   - Establish rapport/trust with patient   Outcome: Progressing  Goal: Refrain from harming others  Outcome: Progressing  Goal: Control angry outbursts  Description: Interventions:  - Monitor patient closely, per order  - Ensure early verbal de-escalation  - Monitor prn medication needs  - Set reasonable/therapeutic limits, outline behavioral expectations, and consequences   - Provide a non-threatening milieu, utilizing the least restrictive interventions   Outcome: Not Progressing

## 2021-04-25 PROCEDURE — 99232 SBSQ HOSP IP/OBS MODERATE 35: CPT | Performed by: PSYCHIATRY & NEUROLOGY

## 2021-04-25 RX ADMIN — GABAPENTIN 600 MG: 300 CAPSULE ORAL at 21:06

## 2021-04-25 RX ADMIN — GABAPENTIN 600 MG: 300 CAPSULE ORAL at 12:56

## 2021-04-25 RX ADMIN — OLANZAPINE 10 MG: 10 TABLET, FILM COATED ORAL at 21:06

## 2021-04-25 RX ADMIN — BENZTROPINE MESYLATE 1 MG: 1 TABLET ORAL at 09:31

## 2021-04-25 RX ADMIN — AMOXICILLIN 500 MG: 500 CAPSULE ORAL at 13:02

## 2021-04-25 RX ADMIN — LORAZEPAM 0.5 MG: 0.5 TABLET ORAL at 17:06

## 2021-04-25 RX ADMIN — FENOFIBRATE 145 MG: 145 TABLET ORAL at 09:31

## 2021-04-25 RX ADMIN — LORAZEPAM 0.5 MG: 0.5 TABLET ORAL at 12:56

## 2021-04-25 RX ADMIN — AMOXICILLIN 500 MG: 500 CAPSULE ORAL at 21:06

## 2021-04-25 RX ADMIN — PSYLLIUM HUSK 1 PACKET: 3.4 POWDER ORAL at 09:34

## 2021-04-25 RX ADMIN — GABAPENTIN 600 MG: 300 CAPSULE ORAL at 17:05

## 2021-04-25 RX ADMIN — BENZTROPINE MESYLATE 1 MG: 1 TABLET ORAL at 23:20

## 2021-04-25 RX ADMIN — DIPHENHYDRAMINE HCL 25 MG: 25 TABLET ORAL at 21:06

## 2021-04-25 RX ADMIN — ESCITALOPRAM OXALATE 20 MG: 10 TABLET ORAL at 09:31

## 2021-04-25 RX ADMIN — AMOXICILLIN 500 MG: 500 CAPSULE ORAL at 05:54

## 2021-04-25 RX ADMIN — CALCIUM CARBONATE (ANTACID) CHEW TAB 500 MG 500 MG: 500 CHEW TAB at 16:31

## 2021-04-25 RX ADMIN — PHENYTOIN 1 MG: 125 SUSPENSION ORAL at 21:06

## 2021-04-25 RX ADMIN — GABAPENTIN 600 MG: 300 CAPSULE ORAL at 09:29

## 2021-04-25 RX ADMIN — LORAZEPAM 0.5 MG: 0.5 TABLET ORAL at 21:06

## 2021-04-25 RX ADMIN — BENZTROPINE MESYLATE 1 MG: 1 TABLET ORAL at 11:03

## 2021-04-25 RX ADMIN — BENZTROPINE MESYLATE 1 MG: 1 TABLET ORAL at 17:06

## 2021-04-25 RX ADMIN — CALCIUM CARBONATE (ANTACID) CHEW TAB 500 MG 500 MG: 500 CHEW TAB at 10:16

## 2021-04-25 RX ADMIN — OLANZAPINE 15 MG: 5 TABLET, FILM COATED ORAL at 09:30

## 2021-04-25 RX ADMIN — OLANZAPINE 2.5 MG: 5 TABLET, ORALLY DISINTEGRATING ORAL at 04:24

## 2021-04-25 RX ADMIN — LORAZEPAM 0.5 MG: 0.5 TABLET ORAL at 09:29

## 2021-04-25 RX ADMIN — OLANZAPINE 2.5 MG: 5 TABLET, ORALLY DISINTEGRATING ORAL at 11:03

## 2021-04-25 RX ADMIN — LIDOCAINE 1 PATCH: 50 PATCH CUTANEOUS at 09:29

## 2021-04-25 RX ADMIN — NICOTINE 1 PATCH: 21 PATCH, EXTENDED RELEASE TRANSDERMAL at 09:32

## 2021-04-25 NOTE — NURSING NOTE
Patient was c/o indigestion and received 1 Lexx at 1630  She was visible for short periods  No behavioral outbursts, but presents with  an irritable edge  She did not require redirection this evening   She currently denies depression, SI and HI

## 2021-04-25 NOTE — NURSING NOTE
Mostly isolative to her room, napping  Out for meds and meals, ate 100% of dinner  Requested Preparation-H given at 2148  No behaviors or issues noted  Continue to monitor

## 2021-04-25 NOTE — NURSING NOTE
Patrick Daniels slept throughout the night, except for waking once at [address] for water  Respirations easy and non labored  Irritable edge noted when she woke up at 0422 and requested a prn  "Zyprexa helps with the nightmares"  Zyprexa 2 5mg po given for same  Has not slept since  Came to nurses station for Amoxicillin at [phone] and took it without an issue  Continue to monitor  Precautions maintained

## 2021-04-25 NOTE — PROGRESS NOTES
04/24/21 1300   Activity/Group Checklist   Group Other (Comment)  (OPEN STUDIO/Process Discussion of Destroyed Artwork)   Attendance Attended   Attendance Duration (min) 46-60   Interactions Interacted appropriately   Affect/Mood Appropriate   Goals Achieved Able to listen to others; Able to engage in interactions

## 2021-04-25 NOTE — PROGRESS NOTES
Progress Note - Behavioral Health   Rashawn Boyd 53 y o  female MRN: [id number]  [facility] Encounter: [id number]    Assessment/Plan   Principal Problem:    Schizoaffective disorder, bipolar type ([address] )  Active Problems:    Post-traumatic stress disorder, chronic    Medical clearance for psychiatric admission    Mild intermittent asthma without complication    Ear problem, bilateral    Per report: she did well last evening  She has been taking her Amoxicillin for a tooth infection  She continues to inquire about ECT tomorrow with Dr Rufus Garcia  She asked for Zyprexa x2 and was medication and meal compliant  She was up twice last night  Per patient: the patient endorsed she was seen in her room lying in bed  She endorsed calf pain and soreness  She stated "its making me go insane!" she was sobbing, but no tears  When the calves were observed, they were soft, not hot or red  She was able to ambulate and move and flex her feet  She denied feeling better and felt she was "going crazy " She was not able to partake in the interview in a meaningful way as she was preoccupied with the calf pain  She wanted to know if the congentin can be made standing and not as needed  If there was any other medication she can take for agitation as hydroxyzine makes her sleepy  She has been taking Zyprexa PRN, but denied it being helpful  She stated that her sleep was interrupted by nightmares, which have improved with prazosin       Behavior over the last 24 hours:  improved  Sleep: normal  Appetite: normal  Medication side effects:  Sore calves  ROS: no complaints    Mental Status Evaluation:  Appearance:  older than stated age   Behavior:  restless and fidgety   Speech:  loud   Mood:  irritable   Affect:  mood-congruent   Thought Process:  circumstantial and perserverative   Thought Content:  delusions  somatic   Perceptual Disturbances: None   Risk Potential: Suicidal Ideations none  Homicidal Ideations none  Potential for Aggression No   Sensorium:  person, place and time/date   Memory:  recent and remote memory grossly intact   Consciousness:  alert and awake    Attention: attention span appeared shorter than expected for age   Insight:  limited   Judgment: limited   Gait/Station: normal gait/station and normal balance   Motor Activity: no abnormal movements     Progress Toward Goals: "I want my calves to stop hurting "      Recommended Treatment: Continue with group therapy, milieu therapy and occupational therapy  Risks, benefits and possible side effects of Medications:   Risks, benefits, and possible side effects of medications explained to patient and patient verbalizes understanding        Medications:   all current active meds have been reviewed, continue current psychiatric medications and current meds:   Current Facility-Administered Medications   Medication Dose Route Frequency    acetaminophen (TYLENOL) tablet 650 mg  650 mg Oral Q6H PRN    acetaminophen (TYLENOL) tablet 650 mg  650 mg Oral Q4H PRN    acetaminophen (TYLENOL) tablet 975 mg  975 mg Oral Q6H PRN    al mag oxide-diphenhydramine-lidocaine viscous (MAGIC MOUTHWASH) suspension 10 mL  10 mL Swish & Swallow Q4H PRN    albuterol (PROVENTIL HFA,VENTOLIN HFA) inhaler 2 puff  2 puff Inhalation Q6H PRN    amoxicillin (AMOXIL) capsule 500 mg  500 mg Oral Q8H [address]    benztropine (COGENTIN) injection 1 mg  1 mg Intramuscular Q4H PRN Max 6/day    benztropine (COGENTIN) tablet 1 mg  1 mg Oral Q4H PRN Max 6/day    benztropine (COGENTIN) tablet 1 mg  1 mg Oral BID    calcium carbonate (TUMS) chewable tablet 500 mg  500 mg Oral TID PRN    diphenhydrAMINE (BENADRYL) tablet 25 mg  25 mg Oral HS    escitalopram (LEXAPRO) tablet 20 mg  20 mg Oral Daily    fenofibrate (TRICOR) tablet 145 mg  145 mg Oral Daily    gabapentin (NEURONTIN) capsule 600 mg  600 mg Oral 4x Daily    haloperidol (HALDOL) tablet 5 mg  5 mg Oral Q6H PRN    hydrOXYzine HCL (ATARAX) tablet 25 mg  25 mg Oral Q6H PRN Max 4/day    hydrOXYzine HCL (ATARAX) tablet 50 mg  50 mg Oral Q6H PRN Max 4/day    lidocaine (LIDODERM) 5 % patch 1 patch  1 patch Topical Daily    LORazepam (ATIVAN) injection 1 mg  1 mg Intramuscular Q6H PRN    LORazepam (ATIVAN) tablet 0 5 mg  0 5 mg Oral 4x Daily    magnesium hydroxide (MILK OF MAGNESIA) oral suspension 30 mL  30 mL Oral Daily PRN    nicotine (NICODERM CQ) 21 mg/24 hr TD 24 hr patch 1 patch  1 patch Transdermal Daily    OLANZapine (ZyPREXA ZYDIS) dispersible tablet 2 5 mg  2 5 mg Oral Q8H PRN    OLANZapine (ZyPREXA ZYDIS) dispersible tablet 5 mg  5 mg Oral Q3H PRN    OLANZapine (ZyPREXA ZYDIS) dispersible tablet 7 5 mg  7 5 mg Oral Q3H PRN    OLANZapine (ZyPREXA) IM injection 10 mg  10 mg Intramuscular Q3H PRN    OLANZapine (ZyPREXA) IM injection 5 mg  5 mg Intramuscular Q3H PRN    OLANZapine (ZyPREXA) tablet 10 mg  10 mg Oral HS    OLANZapine (ZyPREXA) tablet 15 mg  15 mg Oral Daily    Pramox-PE-Glycerin-Petrolatum (PREPARATION H MAX) 1-0 25-14 4-15 % rectal cream 1 application  1 application Rectal 4x Daily PRN    prazosin (MINIPRESS) capsule 1 mg  1 mg Oral HS    psyllium (METAMUCIL) 1 packet  1 packet Oral Daily    Increase morning Zyprexa dose to 15mg and continue night time dose of 10mg  Labs: I have personally reviewed all pertinent laboratory/tests results     Most Recent Labs:   Lab Results   Component Value Date    WBC 6 80 04/09/2021    RBC 4 32 04/09/2021    HGB 13 0 04/09/2021    HCT 39 5 04/09/2021     04/09/2021    RDW 14 3 04/09/2021    NEUTROABS 7 10 (H) 03/16/2021    SODIUM 139 04/09/2021    K 5 0 04/09/2021     04/09/2021    CO2 29 04/09/2021    BUN 15 04/09/2021    CREATININE 0 58 (L) 04/09/2021    GLUC 91 04/09/2021    GLUF 91 04/09/2021    CALCIUM 9 6 04/09/2021    AST 21 04/09/2021    ALT 17 04/09/2021    ALKPHOS 60 04/09/2021    TP 6 9 04/09/2021    ALB 4 0 04/09/2021    TBILI 0 20 04/09/2021    CHOLESTEROL 300 (H) 03/31/2021    HDL 57 03/31/2021    TRIG 658 (H) 03/31/2021    LDLCALC  03/31/2021      Comment:      Calculated LDL invalid, triglycerides >400 mg/dl    NONHDLC 147 08/23/2020    AMMONIA 28 10/27/2017    [id number] 3 810 04/09/2021    FREET4 0 89 03/24/2016    PREGSERUM Negative 10/21/2019    HCG <2 00 04/10/2014    RPR Non-Reactive 03/31/2021    HGBA1C 5 0 08/06/2019    EAG 97 08/06/2019     Warm towel for her calves  Call [facility]  Review PRN zyprexa  Counseling / Coordination of Care  Total floor / unit time spent today 15 minutes  Greater than 50% of total time was spent with the patient and / or family counseling and / or coordination of care  A description of the counseling / coordination of care: staff and patient        This note was not shared with the patient due to reasonable likelihood of causing patient harm

## 2021-04-26 PROCEDURE — 99232 SBSQ HOSP IP/OBS MODERATE 35: CPT | Performed by: PSYCHIATRY & NEUROLOGY

## 2021-04-26 RX ADMIN — AMOXICILLIN 500 MG: 500 CAPSULE ORAL at 14:03

## 2021-04-26 RX ADMIN — DIPHENHYDRAMINE HCL 25 MG: 25 TABLET ORAL at 21:03

## 2021-04-26 RX ADMIN — LORAZEPAM 0.5 MG: 0.5 TABLET ORAL at 17:06

## 2021-04-26 RX ADMIN — LORAZEPAM 0.5 MG: 0.5 TABLET ORAL at 12:06

## 2021-04-26 RX ADMIN — LORAZEPAM 0.5 MG: 0.5 TABLET ORAL at 08:23

## 2021-04-26 RX ADMIN — GABAPENTIN 600 MG: 300 CAPSULE ORAL at 08:24

## 2021-04-26 RX ADMIN — AMOXICILLIN 500 MG: 500 CAPSULE ORAL at 06:04

## 2021-04-26 RX ADMIN — HALOPERIDOL 5 MG: 5 TABLET ORAL at 14:06

## 2021-04-26 RX ADMIN — BENZTROPINE MESYLATE 1 MG: 1 TABLET ORAL at 08:24

## 2021-04-26 RX ADMIN — OLANZAPINE 15 MG: 5 TABLET, FILM COATED ORAL at 08:24

## 2021-04-26 RX ADMIN — CALCIUM CARBONATE (ANTACID) CHEW TAB 500 MG 500 MG: 500 CHEW TAB at 09:04

## 2021-04-26 RX ADMIN — NICOTINE 1 PATCH: 21 PATCH, EXTENDED RELEASE TRANSDERMAL at 08:25

## 2021-04-26 RX ADMIN — AMOXICILLIN 500 MG: 500 CAPSULE ORAL at 21:05

## 2021-04-26 RX ADMIN — BENZTROPINE MESYLATE 1 MG: 1 TABLET ORAL at 17:06

## 2021-04-26 RX ADMIN — LORAZEPAM 0.5 MG: 0.5 TABLET ORAL at 21:04

## 2021-04-26 RX ADMIN — FENOFIBRATE 145 MG: 145 TABLET ORAL at 08:24

## 2021-04-26 RX ADMIN — OLANZAPINE 10 MG: 10 TABLET, FILM COATED ORAL at 21:03

## 2021-04-26 RX ADMIN — GABAPENTIN 600 MG: 300 CAPSULE ORAL at 17:06

## 2021-04-26 RX ADMIN — ESCITALOPRAM OXALATE 20 MG: 10 TABLET ORAL at 08:24

## 2021-04-26 RX ADMIN — LIDOCAINE 1 PATCH: 50 PATCH CUTANEOUS at 08:22

## 2021-04-26 RX ADMIN — GABAPENTIN 600 MG: 300 CAPSULE ORAL at 12:06

## 2021-04-26 RX ADMIN — GABAPENTIN 600 MG: 300 CAPSULE ORAL at 21:03

## 2021-04-26 NOTE — PROGRESS NOTES
04/26/21 1100   Activity/Group Checklist   Group   (IMR )   Attendance Attended   Attendance Duration (min) 46-60   Interactions Interacted appropriately   Affect/Mood Appropriate;Bright;Calm;Normal range   Goals Achieved Identified feelings; Identified relapse prevention strategies; Able to listen to others; Able to engage in interactions; Able to self-disclose

## 2021-04-26 NOTE — NURSING NOTE
Pt is medication and meal compliant  Pt has irritable edge and at times yelling out and screaming in room to " leave me alone, I want them all arrested!"  When approached pt continued to angrily repeat " arrest them, get them out of here", but was unable to elaborate who she was expressing this towards  Pt able to be redirected at that time and rested in bed  Pt has since remained in control of behavior although remains irritable at times  Pt denies s/s with this writer  Will continue to monitor

## 2021-04-26 NOTE — PROGRESS NOTES
04/26/21 1125   Team Meeting   Meeting Type Daily Rounds   Initial Conference Date 04/26/21   Patient/Family Present   Patient Present No   Patient's Family Present No         Daily Rounds Documentation  Team Members Participating  Dr Hilarie Candle, MD Alda Runner, RN  Emmalene Krabbe, LSW  Zaida Hawk, LSW  Sudeep Block, ANNALISE    Case reviewed  Discussed medications, namely Zyprexa and Ativan both scheduled in morning  Irritable, requesting multiple PRNs and seems to scream or yell loudly when she cannot have them  Does well in group (5/5)

## 2021-04-26 NOTE — NURSING NOTE
Pt yelling, cursing, pacing and agitated, unable to be redirected  Po haldol offered and accepted  Will continue to monitor

## 2021-04-26 NOTE — NURSING NOTE
Devon Bustamante has been visible  Patient remains labile and irritable  She is often heard yelling, and cursing  When approached she can not tell staff why  Patient is easily redirectable  She is medication and meal compliant  Consumed 100% of dinner  Attended wrap up group  Continue to monitor

## 2021-04-26 NOTE — NURSING NOTE
Pt awake beginning of shift  Requested cogentin @ 8930 for muscle spasm in B/L LE   Sleeping @ present, will continue to monitor

## 2021-04-26 NOTE — PROGRESS NOTES
Psychiatry Progress Note Paulie Hill [age] y o  female MRN: [id number]  Unit/Bed#: [facility] 109-01 Encounter: [id number]  Code Status: Level 1 - Full Code    PCP: Benigno Dai DO    Date of Admission:  4/7/2021 1435   Date of Service:  04/26/21    Patient Active Problem List   Diagnosis    Schizoaffective disorder, bipolar type ([facility] )    Uncomplicated alcohol dependence ([facility] )    Suicidal behavior    LYNN (generalized anxiety disorder)    Slow transit constipation    Deficiency of vitamin B    Degenerative disc disease, lumbar    Post-traumatic stress disorder, chronic    Lower extremity weakness    Generalized abdominal pain    Non-intractable vomiting    Medical clearance for psychiatric admission    Carbuncle    Alcohol dependence with unspecified alcohol-induced disorder ([facility] )    Mild intermittent asthma without complication    Tobacco abuse    Ear problem, bilateral       Review of systems:      Complaint of calf pain yesterday checked by attending psychiatrist doing rounds  Diagnosis:      Schizoaffective bipolar, alcohol use episodic in early remission in controlled environment  Assessment   Overall Status:       Still agitated silverman labile asking frequent p r n  meds and Zyprexa was increased by the on-call psychiatrist yesterday and now interested in looking into ECT as an option Continues to endorse hallucinations and appears paranoid   Certification Statement: The patient will continue to require additional inpatient hospital stay due to ongoing mood swings paranoia   Acceptance by patient:  accepting    Hopefulness in recovery:  Living in a group home   Understanding of medications: has good understanding   Involved in reintegration process:  Adjusting to the unit   Trusting in relationship with psychiatrist:  trusting   Justification for dual anti-psychotics:  Not applicable   Side effects from treatment: none    Medication changes    none today  Non-pharmacological treatments   Continue with individual, group, milieu and occupational therapy using recovery principles and psycho-education about accepting illness and the need for treatment  Safety   Safety and communication plan established to target dynamic risk factors discussed above  Discharge Plan     most likely to a group home with the act team    Interval Progress    remains silverman agitated paranoid demanding frequent p r n  meds  Still med seeking in fixated on her weight insisting she rather leave a to a group home rather than herself  Remains suspicious paranoid easily agitated labile not attending groups occasionally screams and yells      Sleep:   good   appetite:     good   compliance with medications:  good   Side  Effects:      none   significant events:      Suspicious agitated but more redirectable  group attendance:     Attends some of the groups    Mental Status Exam  Appearance: age appropriate, casually dressed, adequate grooming, looks older than stated age, underweight  Was more friendly and pleasant today when approached  Behavior: normal, pleasant, cooperative, appears anxious  Speech: fluent, coherent, hypertalkative,   Circumstantial pressured  But redirectable today  Mood: depressed, anxious, irritable, euphoric  Affect: tearful, inappropriate, labile, reactive  Thought Process: tends to be pressured and circumstantial and preoccupied perseverating on discharge    Thought Content: some paranoia, but does appear as if paranoid  No current suicidal homicidal thoughts intent or plans verbalized  No phobias obsessions compulsions or distorted body perceptions elicited     Continues to express some paranoia about staff  hypnotizing her  Perceptual Disturbances: no auditory hallucinations, no visual hallucinations  Hx Risk Factors: chronic mood disorder, chronic psychotic symptoms, alcohol use  Sensorium:     Oriented to 3 spheres and to situation  Cognition: recent and remote memory grossly intact  Consciousness: alert and awake  Attention: attention span and concentration are age appropriate  Intellect: appears to be of average intelligence  Insight: improving  Judgement: improving  Motor Activity: no abnormal movements     Vitals  Temp:  [97 9 °F (36 6 °C)] 97 9 °F (36 6 °C)  HR:  [] 107  Resp:  [18] 18  BP: (105-130)/(65-76) 130/65  No intake or output data in the 24 hours ending 04/26/21 4700    Lab Results:  All [place] Admission Reviewed EKG shows increasing       Current Facility-Administered Medications   Medication Dose Route Frequency Provider Last Rate    acetaminophen  650 mg Oral Q6H PRN Eyal Ganser Lodics, CRNP      acetaminophen  650 mg Oral Q4H PRN Eyal Ganser Lodics, CRNP      acetaminophen  975 mg Oral Q6H PRN [place] Ganser Lodics, CRNP      al mag oxide-diphenhydramine-lidocaine viscous  10 mL Swish & Swallow Q4H PRN Aminah Pizano, CRNP      albuterol  2 puff Inhalation Q6H PRN Eyal Ganraz Lama, CRNP      amoxicillin  500 mg Oral Q8H [facility] Leo Gutiérrez MD      benztropine  1 mg Intramuscular Q4H PRN Max 6/day Isis Lama, CRNP      benztropine  1 mg Oral Q4H PRN Max 6/day Isis Lama, CRNP      benztropine  1 mg Oral BID Leo Gutiérrez MD      calcium carbonate  500 mg Oral TID PRN [place] Ganser Lodics, CRNP      diphenhydrAMINE  25 mg Oral HS Isis Lama, CRNP      escitalopram  20 mg Oral Daily Isis Lama, CRNP      fenofibrate  145 mg Oral Daily Isis Lama, CRNP      gabapentin  600 mg Oral 4x Daily Isis Lama, CRNP      haloperidol  5 mg Oral Q6H PRN Isis Lama, CRNP      hydrOXYzine HCL  25 mg Oral Q6H PRN Max 4/day Isis Lama, CRNP      hydrOXYzine HCL  50 mg Oral Q6H PRN Max 4/day Isis Lama, CRNP      lidocaine  1 patch Topical Daily Isis Lama, CRNP      LORazepam  1 mg Intramuscular Q6H PRN Mariel Philip MD      LORazepam  0 5 mg Oral 4x Daily Brooke Mack MD      magnesium hydroxide  30 mL Oral Daily PRN Kamille Lama, JARED      nicotine  1 patch Transdermal Daily Sudarshan Ny, CRKALLIE      OLANZapine  2 5 mg Oral Q8H PRN Sudarshan Ny, CRNP      OLANZapine  5 mg Oral Q3H PRN Sudarshan Ny, CRNP      OLANZapine  7 5 mg Oral Q3H PRN Sudarshan Ny, CRNP      OLANZapine  10 mg Intramuscular Q3H PRN Sudarshan Ny, CRNP      OLANZapine  5 mg Intramuscular Q3H PRN Sudarshan Ny, CRNP      OLANZapine  10 mg Oral HS Daxa Grier MD      OLANZapine  15 mg Oral Daily Daxa Grier MD      Pramox-PE-Glycerin-Petrolatum  1 application Rectal 4x Daily PRN JARED Jones      prazosin  1 mg Oral HS Sudarshan Ny, JARED      psyllium  1 packet Oral Daily JARED Isbell         Counseling / Coordination of Care: Total floor / unit time spent today 15 minutes  Greater than 50% of total time was spent with the patient and / or family counseling and / or somewhat receptive to supportive listening and teaching positive coping skills to deal with symptom mangement  Patient's Rights, confidentiality and exceptions to confidentiality, use of automated medical record, [facility] staff access to medical record, and consent to treatment reviewed  This note was  not shared with the patient because it might further aggravate her psychiatric condition  This note has been dictated

## 2021-04-26 NOTE — PROGRESS NOTES
04/26/21 0700   Activity/Group Checklist   Group   (Coffee Talk )   Attendance Attended   Attendance Duration (min) 46-60   Interactions Interacted appropriately   Affect/Mood Appropriate;Calm;Normal range   Goals Achieved Able to engage in interactions; Able to listen to others; Identified feelings

## 2021-04-26 NOTE — PLAN OF CARE
Problem: Alteration in Thoughts and Perception  Goal: Attend and participate in unit activities, including therapeutic, recreational, and educational groups  Description: Interventions:  -Encourage Visitation and family involvement in care  Outcome: Progressing   Patient completed Goal Card for the week of 4/26/21  Goals:  Work with SMART goal notes              Complete groups              Be more careful about my psychotic episodes

## 2021-04-26 NOTE — PLAN OF CARE
Problem: Alteration in Thoughts and Perception  Goal: Treatment Goal: Gain control of psychotic behaviors/thinking, reduce/eliminate presenting symptoms and demonstrate improved reality functioning upon discharge  Outcome: Progressing  Goal: Verbalize thoughts and feelings  Description: Interventions:  - Promote a nonjudgmental and trusting relationship with the patient through active listening and therapeutic communication  - Assess patient's level of functioning, behavior and potential for risk  - Engage patient in 1 on 1 interactions  - Encourage patient to express fears, feelings, frustrations, and discuss symptoms    - [place] patient to reality, help patient recognize reality-based thinking   - Administer medications as ordered and assess for potential side effects  - Provide the patient education related to the signs and symptoms of the illness and desired effects of prescribed medications  Outcome: Progressing  Goal: Refrain from acting on delusional thinking/internal stimuli  Description: Interventions:  - Monitor patient closely, per order   - Utilize least restrictive measures   - Set reasonable limits, give positive feedback for acceptable   - Administer medications as ordered and monitor of potential side effects  Outcome: Not Progressing  Goal: Agree to be compliant with medication regime, as prescribed and report medication side effects  Description: Interventions:  - Offer appropriate PRN medication and supervise ingestion; conduct AIMS, as needed   Outcome: Progressing  Goal: Attend and participate in unit activities, including therapeutic, recreational, and educational groups  Description: Interventions:  -Encourage Visitation and family involvement in care  Outcome: Progressing  Goal: Recognize dysfunctional thoughts, communicate reality-based thoughts at the time of discharge  Description: Interventions:  - Provide medication and psycho-education to assist patient in compliance and developing insight into his/her illness   Outcome: Not Progressing  Goal: Complete daily ADLs, including personal hygiene independently, as able  Description: Interventions:  - Observe, teach, and assist patient with ADLS  - Monitor and promote a balance of rest/activity, with adequate nutrition and elimination   Outcome: Progressing     Problem: Ineffective Coping  Goal: Cooperates with admission process  Description: Interventions:   - Complete admission process  Outcome: Progressing  Goal: Identifies ineffective coping skills  Outcome: Not Progressing  Goal: Identifies healthy coping skills  Outcome: Progressing  Goal: Demonstrates healthy coping skills  Outcome: Not Progressing  Goal: Participates in unit activities  Description: Interventions:  - Provide therapeutic environment   - Provide required programming   - Redirect inappropriate behaviors   Outcome: Progressing  Goal: Patient/Family participate in treatment and DC plans  Description: Interventions:  - Provide therapeutic environment  Outcome: Progressing  Goal: Patient/Family verbalizes awareness of resources  Outcome: Progressing  Goal: Understands least restrictive measures  Description: Interventions:  - Utilize least restrictive behavior  Outcome: Progressing  Goal: Free from restraint events  Description: - Utilize least restrictive measures   - Provide behavioral interventions   - Redirect inappropriate behaviors   Outcome: Progressing     Problem: Risk for Self Injury/Neglect  Goal: Treatment Goal: Remain safe during length of stay, learn and adopt new coping skills, and be free of self-injurious ideation, impulses and acts at the time of discharge  Outcome: Progressing  Goal: Verbalize thoughts and feelings  Description: Interventions:  - Assess and re-assess patient's lethality and potential for self-injury  - Engage patient in 1:1 interactions, daily, for a minimum of 15 minutes  - Encourage patient to express feelings, fears, frustrations, hopes  - Establish rapport/trust with patient   Outcome: Progressing  Goal: Refrain from harming self  Description: Interventions:  - Monitor patient closely, per order  - Develop a trusting relationship  - Supervise medication ingestion, monitor effects and side effects   Outcome: Progressing  Goal: Attend and participate in unit activities, including therapeutic, recreational, and educational groups  Description: Interventions:  - Provide therapeutic and educational activities daily, encourage attendance and participation, and document same in the medical record  - Obtain collateral information, encourage visitation and family involvement in care   Outcome: Progressing  Goal: Recognize maladaptive responses and adopt new coping mechanisms  Outcome: Not Progressing  Goal: Complete daily ADLs, including personal hygiene independently, as able  Description: Interventions:  - Observe, teach, and assist patient with ADLS  - Monitor and promote a balance of rest/activity, with adequate nutrition and elimination  Outcome: Progressing     Problem: Risk for Self Injury/Neglect  Goal: Treatment Goal: Remain safe during length of stay, learn and adopt new coping skills, and be free of self-injurious ideation, impulses and acts at the time of discharge  Outcome: Progressing  Goal: Verbalize thoughts and feelings  Description: Interventions:  - Assess and re-assess patient's lethality and potential for self-injury  - Engage patient in 1:1 interactions, daily, for a minimum of 15 minutes  - Encourage patient to express feelings, fears, frustrations, hopes  - Establish rapport/trust with patient   Outcome: Progressing  Goal: Refrain from harming self  Description: Interventions:  - Monitor patient closely, per order  - Develop a trusting relationship  - Supervise medication ingestion, monitor effects and side effects   Outcome: Progressing  Goal: Attend and participate in unit activities, including therapeutic, recreational, and educational groups  Description: Interventions:  - Provide therapeutic and educational activities daily, encourage attendance and participation, and document same in the medical record  - Obtain collateral information, encourage visitation and family involvement in care   Outcome: Progressing  Goal: Recognize maladaptive responses and adopt new coping mechanisms  Outcome: Not Progressing  Goal: Complete daily ADLs, including personal hygiene independently, as able  Description: Interventions:  - Observe, teach, and assist patient with ADLS  - Monitor and promote a balance of rest/activity, with adequate nutrition and elimination  Outcome: Progressing     Problem: Depression  Goal: Treatment Goal: Demonstrate behavioral control of depressive symptoms, verbalize feelings of improved mood/affect, and adopt new coping skills prior to discharge  Outcome: Not Progressing  Goal: Verbalize thoughts and feelings  Description: Interventions:  - Assess and re-assess patient's level of risk   - Engage patient in 1:1 interactions, daily, for a minimum of 15 minutes   - Encourage patient to express feelings, fears, frustrations, hopes   Outcome: Progressing  Goal: Refrain from harming self  Description: Interventions:  - Monitor patient closely, per order   - Supervise medication ingestion, monitor effects and side effects   Outcome: Progressing  Goal: Refrain from isolation  Description: Interventions:  - Develop a trusting relationship   - Encourage socialization   Outcome: Progressing  Goal: Refrain from self-neglect  Outcome: Progressing  Goal: Attend and participate in unit activities, including therapeutic, recreational, and educational groups  Description: Interventions:  - Provide therapeutic and educational activities daily, encourage attendance and participation, and document same in the medical record   Outcome: Progressing  Goal: Complete daily ADLs, including personal hygiene independently, as able  Description: Interventions:  - Observe, teach, and assist patient with ADLS  -  Monitor and promote a balance of rest/activity, with adequate nutrition and elimination   Outcome: Progressing     Problem: Anxiety  Goal: Anxiety is at manageable level  Description: Interventions:  - Assess and monitor patient's anxiety level  - Monitor for signs and symptoms (heart palpitations, chest pain, shortness of breath, headaches, nausea, feeling jumpy, restlessness, irritable, apprehensive)  - Collaborate with interdisciplinary team and initiate plan and interventions as ordered    - [place] patient to unit/surroundings  - Explain treatment plan  - Encourage participation in care  - Encourage verbalization of concerns/fears  - Identify coping mechanisms  - Assist in developing anxiety-reducing skills  - Administer/offer alternative therapies  - Limit or eliminate stimulants  Outcome: Progressing

## 2021-04-26 NOTE — PROGRESS NOTES
04/26/21 0900   Activity/Group Checklist   Group Community meeting   Attendance Attended   Attendance Duration (min) 16-30   Interactions Interacted appropriately   Affect/Mood Appropriate;Bright;Calm;Normal range   Goals Achieved Identified feelings; Able to listen to others; Able to engage in interactions

## 2021-04-27 PROCEDURE — 99232 SBSQ HOSP IP/OBS MODERATE 35: CPT | Performed by: PSYCHIATRY & NEUROLOGY

## 2021-04-27 RX ADMIN — BENZTROPINE MESYLATE 1 MG: 1 TABLET ORAL at 17:18

## 2021-04-27 RX ADMIN — GABAPENTIN 600 MG: 300 CAPSULE ORAL at 11:48

## 2021-04-27 RX ADMIN — LORAZEPAM 0.5 MG: 0.5 TABLET ORAL at 17:18

## 2021-04-27 RX ADMIN — GABAPENTIN 600 MG: 300 CAPSULE ORAL at 17:18

## 2021-04-27 RX ADMIN — LIDOCAINE 1 PATCH: 50 PATCH CUTANEOUS at 08:35

## 2021-04-27 RX ADMIN — DIPHENHYDRAMINE HCL 25 MG: 25 TABLET ORAL at 21:02

## 2021-04-27 RX ADMIN — AMOXICILLIN 500 MG: 500 CAPSULE ORAL at 21:02

## 2021-04-27 RX ADMIN — NICOTINE 1 PATCH: 21 PATCH, EXTENDED RELEASE TRANSDERMAL at 08:36

## 2021-04-27 RX ADMIN — LORAZEPAM 0.5 MG: 0.5 TABLET ORAL at 11:47

## 2021-04-27 RX ADMIN — OLANZAPINE 15 MG: 5 TABLET, FILM COATED ORAL at 08:33

## 2021-04-27 RX ADMIN — OLANZAPINE 10 MG: 10 TABLET, FILM COATED ORAL at 21:02

## 2021-04-27 RX ADMIN — GABAPENTIN 600 MG: 300 CAPSULE ORAL at 08:34

## 2021-04-27 RX ADMIN — AMOXICILLIN 500 MG: 500 CAPSULE ORAL at 14:28

## 2021-04-27 RX ADMIN — FENOFIBRATE 145 MG: 145 TABLET ORAL at 08:34

## 2021-04-27 RX ADMIN — ESCITALOPRAM OXALATE 20 MG: 10 TABLET ORAL at 08:34

## 2021-04-27 RX ADMIN — CALCIUM CARBONATE (ANTACID) CHEW TAB 500 MG 500 MG: 500 CHEW TAB at 14:51

## 2021-04-27 RX ADMIN — GABAPENTIN 600 MG: 300 CAPSULE ORAL at 21:02

## 2021-04-27 RX ADMIN — AMOXICILLIN 500 MG: 500 CAPSULE ORAL at 05:05

## 2021-04-27 RX ADMIN — LORAZEPAM 0.5 MG: 0.5 TABLET ORAL at 08:34

## 2021-04-27 RX ADMIN — LORAZEPAM 0.5 MG: 0.5 TABLET ORAL at 21:02

## 2021-04-27 RX ADMIN — BENZTROPINE MESYLATE 1 MG: 1 TABLET ORAL at 08:33

## 2021-04-27 RX ADMIN — PSYLLIUM HUSK 1 PACKET: 3.4 POWDER ORAL at 08:36

## 2021-04-27 NOTE — NURSING NOTE
Patient is pleasant and cooperative with the care  Patient stated she is doing much better and stated she didn't have any psychotic episode today  Patient denies SI,HI

## 2021-04-27 NOTE — PROGRESS NOTES
04/27/21 1400   Activity/Group Checklist   Group   (Recovery Workshop)   Attendance Attended   Attendance Duration (min) 46-60   Interactions Disorganized interaction   Affect/Mood Appropriate   Goals Achieved Able to engage in interactions; Able to listen to others; Identified feelings

## 2021-04-27 NOTE — PROGRESS NOTES
04/27/21 1305   Team Meeting   Meeting Type Daily Rounds   Initial Conference Date 04/27/21   Patient/Family Present   Patient Present No   Patient's Family Present No         Daily Rounds Documentation  Team Members Participating  MD Hiren Viveros, YUDY Martinez, DECLANW  Abel Mo, DECLANW  Nicolas Mansfield, ANNALISE Loya RN    Case reviewed  Multiple PRN requests  Labile, less yelling  4/6 groups

## 2021-04-27 NOTE — PROGRESS NOTES
04/27/21 0700   Activity/Group Checklist   Group   (Coffee Talk )   Attendance Attended   Attendance Duration (min) 46-60   Interactions Interacted appropriately   Affect/Mood Appropriate;Calm;Normal range   Goals Achieved Able to listen to others; Able to engage in interactions

## 2021-04-27 NOTE — PROGRESS NOTES
Psychiatry Progress Note [facility] 53 y o  female MRN: [id number]  Unit/Bed#: [facility] 109-01 Encounter: [id number]  Code Status: Level 1 - Full Code    PCP: Naty Calvin DO    Date of Admission:  4/7/2021 1435   Date of Service:  04/27/21    Patient Active Problem List   Diagnosis    Schizoaffective disorder, bipolar type ([facility] )    Uncomplicated alcohol dependence ([facility] )    Suicidal behavior    LYNN (generalized anxiety disorder)    Slow transit constipation    Deficiency of vitamin B    Degenerative disc disease, lumbar    Post-traumatic stress disorder, chronic    Lower extremity weakness    Generalized abdominal pain    Non-intractable vomiting    Medical clearance for psychiatric admission    Carbuncle    Alcohol dependence with unspecified alcohol-induced disorder ([facility] )    Mild intermittent asthma without complication    Tobacco abuse    Ear problem, bilateral       Review of systems:       Unremarkable today  Diagnosis:      Schizoaffective bipolar, alcohol use episodic in early remission in controlled environment  Assessment   Overall Status:        Still silverman agitated screams and yells and becomes paranoid actively responding to internal stimuli off and on   Certification Statement: The patient will continue to require additional inpatient hospital stay due to ongoing mood swings paranoia   Acceptance by patient:  accepting    Hopefulness in recovery:  Living in a group home   Understanding of medications: has good understanding   Involved in reintegration process:  Adjusting to the unit   Trusting in relationship with psychiatrist:  trusting   Justification for dual anti-psychotics:  Not applicable   Side effects from treatment: none    Medication changes    none today  Non-pharmacological treatments   Continue with individual, group, milieu and occupational therapy using recovery principles and psycho-education about accepting illness and the need for treatment  Safety   Safety and communication plan established to target dynamic risk factors discussed above  Discharge Plan     most likely to a group home with the act team    Interval Progress     Was friendly and pleasant and content yesterday morning when I spoke to her but since then she has been again yelling screaming talking to herself loudly and responding to internal stimuli talking about wanting to "arrest them all" not explaining whom she was referring to possibly responding to voices  Still somewhat suspicious labile easily agitated paranoid  Still got prn haldol, tums, and wasseeking extra zyprexa which was not given  Tells me haldol does help with voices as prn       Sleep:     Good   appetite:    Good   compliance with medications:  good   Side  Effects:      none   significant events:       Suspicious agitated but redirectable   group attendance:     Attending some of the groups 4/6    Mental Status Exam  Appearance: age appropriate, casually dressed, adequate grooming, looks older than stated age, underweight  Pleasant and friendly and attended team Behavior: normal, pleasant, cooperative, appears anxious  Speech: fluent, coherent, hypertalkative,   Circumstantial pressured  But more redirectable   Mood: depressed, anxious, labile, euphoric  Affect: inappropriate, labile, reactive, slightly brighter  Thought Process: tends to be pressured and circumstantial and preoccupied perseverating on discharge    Thought Content: some paranoia, mild paranoia, ideas of reference, but does appear as if paranoid  No current suicidal homicidal thoughts intent or plans verbalized  No phobias obsessions compulsions or distorted body perceptions elicited     Believes it is possible for staff to hypnotize her   Perceptual Disturbances: no auditory hallucinations, no visual hallucinations  Hx Risk Factors: chronic mood disorder, chronic psychotic symptoms, alcohol use  Sensorium:     Oriented to 3 spheres and to situation   Cognition: recent and remote memory grossly intact  Consciousness: alert and awake  Attention: attention span and concentration are age appropriate  Intellect: appears to be of average intelligence  Insight: improving  Judgement: improving  Motor Activity: no abnormal movements     Vitals  Temp:  [97 5 °F (36 4 °C)] 97 5 °F (36 4 °C)  HR:  [62-98] 67  Resp:  [16-18] 16  BP: ()/(64-72) 136/66  No intake or output data in the 24 hours ending 04/27/21 1000    Lab Results:  All [facility] Admission Reviewed EKG shows increasing       Current Facility-Administered Medications   Medication Dose Route Frequency Provider Last Rate    acetaminophen  650 mg Oral Q6H PRN Mortimer Bridges Lodics, CRNP      acetaminophen  650 mg Oral Q4H PRN Mortimer Bridges Lodics, CRNP      acetaminophen  975 mg Oral Q6H PRN Mortimer Bridges Lodics, CRNP      al mag oxide-diphenhydramine-lidocaine viscous  10 mL Swish & Swallow Q4H PRN Dale Collar, CRNP      albuterol  2 puff Inhalation Q6H PRN Mortimer Bridges Lodics, CRNP      amoxicillin  500 mg Oral Q8H [address] Naman Burciaga MD      benztropine  1 mg Intramuscular Q4H PRN Max 6/day Isis Porterics, CRNP      benztropine  1 mg Oral Q4H PRN Max 6/day Isis Porterics, CRNP      benztropine  1 mg Oral BID Naman Burciaga MD      calcium carbonate  500 mg Oral TID PRN Mortimer Bridges Lodics, CRNP      diphenhydrAMINE  25 mg Oral HS Isis Porterics, CRNP      escitalopram  20 mg Oral Daily Isis Porterics, CRNP      fenofibrate  145 mg Oral Daily Isis Porterics, CRNP      gabapentin  600 mg Oral 4x Daily Isis Porterics, CRNP      haloperidol  5 mg Oral Q6H PRN Isis Porterics, CRNP      hydrOXYzine HCL  25 mg Oral Q6H PRN Max 4/day Isis N Lodics, CRNP      hydrOXYzine HCL  50 mg Oral Q6H PRN Max 4/day Isis Porterics, CRNP      lidocaine  1 patch Topical Daily Isis Porterics, CRNP      LORazepam  1 mg Intramuscular Q6H PRN Padmini Nelson MD      LORazepam  0 5 mg Oral 4x Daily Padmini Nelson MD      magnesium hydroxide  30 mL Oral Daily PRN Edward Lama, JARED      nicotine  1 patch Transdermal Daily JARED [employer]      OLANZapine  2 5 mg Oral Q8H PRN Ousmane Soto, JARED      OLANZapine  5 mg Oral Q3H PRN Ousmane Soto, JARED      OLANZapine  7 5 mg Oral Q3H PRN Ousmane Soto, JARED      OLANZapine  10 mg Intramuscular Q3H PRN Ousmane Soto, JARED      OLANZapine  5 mg Intramuscular Q3H PRN Ousmane Soto, JARED      OLANZapine  10 mg Oral HS Adolfo Counts, MD      OLANZapine  15 mg Oral Daily Adolfo Counts, MD      Pramox-PE-Glycerin-Petrolatum  1 application Rectal 4x Daily PRN Edward Lama, JARED      prazosin  1 mg Oral HS JARED [employer]      psyllium  1 packet Oral Daily JARED Isbell         Counseling / Coordination of Care: Total floor / unit time spent today 15 minutes  Greater than 50% of total time was spent with the patient and / or family counseling and / or somewhat receptive to supportive listening and teaching positive coping skills to deal with symptom mangement  Patient's Rights, confidentiality and exceptions to confidentiality, use of automated medical record, Howie Vernon misbah staff access to medical record, and consent to treatment reviewed  This note was  not shared with the patient because it might further aggravate her psychiatric condition  This note has been dictated

## 2021-04-27 NOTE — PLAN OF CARE
Problem: Alteration in Thoughts and Perception  Goal: Refrain from acting on delusional thinking/internal stimuli  Description: Interventions:  - Monitor patient closely, per order   - Utilize least restrictive measures   - Set reasonable limits, give positive feedback for acceptable   - Administer medications as ordered and monitor of potential side effects  Outcome: Not Progressing  Goal: Agree to be compliant with medication regime, as prescribed and report medication side effects  Description: Interventions:  - Offer appropriate PRN medication and supervise ingestion; conduct AIMS, as needed   Outcome: Progressing  Goal: Attend and participate in unit activities, including therapeutic, recreational, and educational groups  Description: Interventions:  -Encourage Visitation and family involvement in care  Outcome: Progressing  Goal: Complete daily ADLs, including personal hygiene independently, as able  Description: Interventions:  - Observe, teach, and assist patient with ADLS  - Monitor and promote a balance of rest/activity, with adequate nutrition and elimination   Outcome: Progressing

## 2021-04-27 NOTE — NURSING NOTE
[facility] has been visible  Patient remains labile and irritable  No yelling noted this shift  Patient was isolative to self  She is medication and meal compliant  Consumed 100% of dinner  Patient did not attend any groups this shift  Continue to monitor

## 2021-04-27 NOTE — PROGRESS NOTES
04/27/21 1100   Activity/Group Checklist   Group   (IMR)   Attendance Attended   Attendance Duration (min) 46-60   Interactions Interacted appropriately   Affect/Mood Appropriate;Bright;Calm;Normal range   Goals Achieved Identified feelings; Able to listen to others; Able to engage in interactions

## 2021-04-27 NOTE — PROGRESS NOTES
04/27/21 0900   Activity/Group Checklist   Group Community meeting   Attendance Attended   Attendance Duration (min) 31-45   Interactions Interacted appropriately   Affect/Mood Appropriate;Calm   Goals Achieved Able to listen to others; Able to engage in interactions

## 2021-04-28 LAB
ATRIAL RATE: 81 BPM
P AXIS: 47 DEGREES
PR INTERVAL: 162 MS
QRS AXIS: 10 DEGREES
QRSD INTERVAL: 78 MS
QT INTERVAL: 402 MS
QTC INTERVAL: 466 MS
T WAVE AXIS: 44 DEGREES
VENTRICULAR RATE: 81 BPM

## 2021-04-28 PROCEDURE — 99232 SBSQ HOSP IP/OBS MODERATE 35: CPT | Performed by: PSYCHIATRY & NEUROLOGY

## 2021-04-28 PROCEDURE — 93005 ELECTROCARDIOGRAM TRACING: CPT

## 2021-04-28 PROCEDURE — 93010 ELECTROCARDIOGRAM REPORT: CPT

## 2021-04-28 RX ORDER — HALOPERIDOL 1 MG/1
2 TABLET ORAL 2 TIMES DAILY
Status: DISCONTINUED | OUTPATIENT
Start: 2021-04-28 | End: 2021-05-01

## 2021-04-28 RX ADMIN — OLANZAPINE 15 MG: 5 TABLET, FILM COATED ORAL at 09:01

## 2021-04-28 RX ADMIN — GABAPENTIN 600 MG: 300 CAPSULE ORAL at 12:45

## 2021-04-28 RX ADMIN — GABAPENTIN 600 MG: 300 CAPSULE ORAL at 09:01

## 2021-04-28 RX ADMIN — LORAZEPAM 0.5 MG: 0.5 TABLET ORAL at 09:01

## 2021-04-28 RX ADMIN — NICOTINE 1 PATCH: 21 PATCH, EXTENDED RELEASE TRANSDERMAL at 09:00

## 2021-04-28 RX ADMIN — LORAZEPAM 0.5 MG: 0.5 TABLET ORAL at 12:45

## 2021-04-28 RX ADMIN — ESCITALOPRAM OXALATE 20 MG: 10 TABLET ORAL at 09:01

## 2021-04-28 RX ADMIN — HALOPERIDOL 2 MG: 1 TABLET ORAL at 17:14

## 2021-04-28 RX ADMIN — BENZTROPINE MESYLATE 1 MG: 1 TABLET ORAL at 09:01

## 2021-04-28 RX ADMIN — GABAPENTIN 600 MG: 300 CAPSULE ORAL at 21:04

## 2021-04-28 RX ADMIN — LORAZEPAM 0.5 MG: 0.5 TABLET ORAL at 17:13

## 2021-04-28 RX ADMIN — OLANZAPINE 10 MG: 10 TABLET, FILM COATED ORAL at 21:04

## 2021-04-28 RX ADMIN — AMOXICILLIN 500 MG: 500 CAPSULE ORAL at 05:58

## 2021-04-28 RX ADMIN — DIPHENHYDRAMINE HCL 25 MG: 25 TABLET ORAL at 21:04

## 2021-04-28 RX ADMIN — GABAPENTIN 600 MG: 300 CAPSULE ORAL at 17:13

## 2021-04-28 RX ADMIN — FENOFIBRATE 145 MG: 145 TABLET ORAL at 09:01

## 2021-04-28 RX ADMIN — LIDOCAINE 1 PATCH: 50 PATCH CUTANEOUS at 09:00

## 2021-04-28 RX ADMIN — PSYLLIUM HUSK 1 PACKET: 3.4 POWDER ORAL at 09:20

## 2021-04-28 RX ADMIN — AMOXICILLIN 500 MG: 500 CAPSULE ORAL at 21:04

## 2021-04-28 RX ADMIN — BENZTROPINE MESYLATE 1 MG: 1 TABLET ORAL at 17:13

## 2021-04-28 RX ADMIN — AMOXICILLIN 500 MG: 500 CAPSULE ORAL at 14:49

## 2021-04-28 RX ADMIN — LORAZEPAM 0.5 MG: 0.5 TABLET ORAL at 21:04

## 2021-04-28 RX ADMIN — HALOPERIDOL 2 MG: 1 TABLET ORAL at 12:44

## 2021-04-28 NOTE — PROGRESS NOTES
04/28/21 0900   Activity/Group Checklist   Group Community meeting   Attendance Attended   Attendance Duration (min) 16-30   Interactions Interacted appropriately   Affect/Mood Appropriate;Calm;Normal range   Goals Achieved Able to listen to others; Able to engage in interactions

## 2021-04-28 NOTE — PROGRESS NOTES
04/28/21 1100   Activity/Group Checklist   Group   (IMR)   Attendance Attended   Attendance Duration (min) 46-60   Interactions Did not interact   Affect/Mood Appropriate;Calm;Normal range   Goals Achieved Able to listen to others

## 2021-04-28 NOTE — SOCIAL WORK
Pt reached out to SETH this afternoon asking for coffee  Chaya Claros "I didn't get my morning coffee, I can tell I have a head ache now    can you give me some?" SETH explained coffee is not just handed out but may be offered in therapy session if appropriate  Pt agreeable and stated she's looking forward to session tomorrow  Pt observed to be less labile and less irritable at this time

## 2021-04-28 NOTE — PLAN OF CARE
Problem: Alteration in Thoughts and Perception  Goal: Verbalize thoughts and feelings  Description: Interventions:  - Promote a nonjudgmental and trusting relationship with the patient through active listening and therapeutic communication  - Assess patient's level of functioning, behavior and potential for risk  - Engage patient in 1 on 1 interactions  - Encourage patient to express fears, feelings, frustrations, and discuss symptoms    - [place] patient to reality, help patient recognize reality-based thinking   - Administer medications as ordered and assess for potential side effects  - Provide the patient education related to the signs and symptoms of the illness and desired effects of prescribed medications  Outcome: Progressing  Goal: Refrain from acting on delusional thinking/internal stimuli  Description: Interventions:  - Monitor patient closely, per order   - Utilize least restrictive measures   - Set reasonable limits, give positive feedback for acceptable   - Administer medications as ordered and monitor of potential side effects  Outcome: Progressing  Goal: Agree to be compliant with medication regime, as prescribed and report medication side effects  Description: Interventions:  - Offer appropriate PRN medication and supervise ingestion; conduct AIMS, as needed   Outcome: Progressing  Goal: Attend and participate in unit activities, including therapeutic, recreational, and educational groups  Description: Interventions:  -Encourage Visitation and family involvement in care  Outcome: Progressing     Problem: Ineffective Coping  Goal: Demonstrates healthy coping skills  Outcome: Progressing  Goal: Participates in unit activities  Description: Interventions:  - Provide therapeutic environment   - Provide required programming   - Redirect inappropriate behaviors   Outcome: Progressing  Goal: Understands least restrictive measures  Description: Interventions:  - Utilize least restrictive behavior  Outcome: Progressing     Problem: Risk for Self Injury/Neglect  Goal: Verbalize thoughts and feelings  Description: Interventions:  - Assess and re-assess patient's lethality and potential for self-injury  - Engage patient in 1:1 interactions, daily, for a minimum of 15 minutes  - Encourage patient to express feelings, fears, frustrations, hopes  - Establish rapport/trust with patient   Outcome: Progressing  Goal: Refrain from harming self  Description: Interventions:  - Monitor patient closely, per order  - Develop a trusting relationship  - Supervise medication ingestion, monitor effects and side effects   Outcome: Progressing  Goal: Attend and participate in unit activities, including therapeutic, recreational, and educational groups  Description: Interventions:  - Provide therapeutic and educational activities daily, encourage attendance and participation, and document same in the medical record  - Obtain collateral information, encourage visitation and family involvement in care   Outcome: Progressing     Problem: Depression  Goal: Refrain from isolation  Description: Interventions:  - Develop a trusting relationship   - Encourage socialization   Outcome: Progressing  Goal: Attend and participate in unit activities, including therapeutic, recreational, and educational groups  Description: Interventions:  - Provide therapeutic and educational activities daily, encourage attendance and participation, and document same in the medical record   Outcome: Progressing     Problem: Anxiety  Goal: Anxiety is at manageable level  Description: Interventions:  - Assess and monitor patient's anxiety level  - Monitor for signs and symptoms (heart palpitations, chest pain, shortness of breath, headaches, nausea, feeling jumpy, restlessness, irritable, apprehensive)  - Collaborate with interdisciplinary team and initiate plan and interventions as ordered    - [place] patient to unit/surroundings  - Explain treatment plan  - Encourage participation in care  - Encourage verbalization of concerns/fears  - Identify coping mechanisms  - Assist in developing anxiety-reducing skills  - Administer/offer alternative therapies  - Limit or eliminate stimulants  Outcome: Progressing     Problem: Risk for Violence/Aggression Toward Others  Goal: Verbalize thoughts and feelings  Description: Interventions:  - Assess and re-assess patient's level of risk, every waking shift  - Engage patient in 1:1 interactions, daily, for a minimum of 15 minutes   - Allow patient to express feelings and frustrations in a safe and non-threatening manner   - Establish rapport/trust with patient   Outcome: Progressing  Goal: Control angry outbursts  Description: Interventions:  - Monitor patient closely, per order  - Ensure early verbal de-escalation  - Monitor prn medication needs  - Set reasonable/therapeutic limits, outline behavioral expectations, and consequences   - Provide a non-threatening milieu, utilizing the least restrictive interventions   Outcome: Progressing     Problem: Alteration in Orientation  Goal: Interact with staff daily  Description: Interventions:  - Assess and re-assess patient's level of orientation  - Engage patient in 1 on 1 interactions, daily, for a minimum of 15 minutes   - Establish rapport/trust with patient   Outcome: Progressing

## 2021-04-28 NOTE — NURSING NOTE
Pt is medication and meal compliant  Pt is visible in the milieu with minimal outbursts of yelling in room  Pt otherwise calm and keeping to self coloring or resting in room  Pt is suspicious and guarded denying psych s/s only reporting feeling "tired and down", but would not elaborate  Will continue to monitor

## 2021-04-28 NOTE — NURSING NOTE
Charlie Rivera maintained on ongoing SAFE precaution without incident on this shift   Laying in bed with eyes closed, breath even and unlabored   Continues on ABT Amoxicillin 500mg for prophylasis post dental work with no adverse reaction noted  Denies any oral pain or discomfort at this time  Q 7 minutes rounding implemented continuously   No behavioral noted   Will continue to monitor

## 2021-04-28 NOTE — PROGRESS NOTES
Psychiatry Progress Note Paulie Hill [age] y o  female MRN: [id number]  Unit/Bed#: [facility] 109-01 Encounter: [id number]  Code Status: Level 1 - Full Code    PCP: Margaret Jenkins DO    Date of Admission:  4/7/2021 1435   Date of Service:  04/28/21    Patient Active Problem List   Diagnosis    Schizoaffective disorder, bipolar type ([facility] )    Uncomplicated alcohol dependence ([facility] )    Suicidal behavior    LYNN (generalized anxiety disorder)    Slow transit constipation    Deficiency of vitamin B    Degenerative disc disease, lumbar    Post-traumatic stress disorder, chronic    Lower extremity weakness    Generalized abdominal pain    Non-intractable vomiting    Medical clearance for psychiatric admission    Carbuncle    Alcohol dependence with unspecified alcohol-induced disorder ([facility] )    Mild intermittent asthma without complication    Tobacco abuse    Ear problem, bilateral       Review of systems:       All unremarkable  Diagnosis:     Schizoaffective bipolar  Alcohol use episodic in early remission in controlled environment  ,Assessment   Overall Status:      Yelling and screaming markedly decreased and she feels the increase in Zyprexa did help and believes that the p r n   Haldol is also helpful and is much more friendly and pleasant not as labile as before   Certification Statement: The patient will continue to require additional inpatient hospital stay due to ongoing mood swings paranoia   Acceptance by patient:  accepting    Hopefulness in recovery:  Living in a group home   Understanding of medications: has good understanding   Involved in reintegration process:  Adjusting to the unit   Trusting in relationship with psychiatrist:  trusting   Justification for dual anti-psychotics:  Not applicable   Side effects from treatment: none    Medication changes    add Haldol 2 mg twice a day which has been effective was p r n  for the voices  Non-pharmacological treatments   Continue with individual, group, milieu and occupational therapy using recovery principles and psycho-education about accepting illness and the need for treatment  Safety   Safety and communication plan established to target dynamic risk factors discussed above  Discharge Plan     most likely to a group home with the act team    Interval Progress   Patient is slowly improving and is not yelling or screaming like she used to before     She claims the voices have lessened and she is not "psychotic"  All friendly and pleasant attending more groups but occasionally does believe that staff may be still doing hypnosis on her  Overall doing better  Did yell and scream coming out of groups and was able to control them when told she needs to do so in the observation room    Sleep:     good   appetite:   Good   compliance with medications all:good   Side  Effects:        None today   significant events:      A paranoid and still preoccupied but more pleasant friendly and progressing and  Screamind yelling has markedly decreased   group attendance:    Attending  Most of the groups 5/7    Mental Status Exam  Appearance: age appropriate, casually dressed, adequate grooming, looks older than stated age, underweight  Pleasant friendly today   Behavior: normal, pleasant, cooperative, appears anxious  Speech: fluent, coherent, hypertalkative,   Circumstantial pressured   More redirectable  Mood: depressed, anxious, labile, euphoric  Affect: inappropriate, labile, reactive, slightly brighter  Thought Process: tends to be pressured and circumstantial and preoccupied perseverating on discharge    Thought Content: some paranoia, mild paranoia, ideas of reference, but does appear as if paranoid  No current suicidal homicidal thoughts intent or plans verbalized  No phobias obsessions compulsions or distorted body perceptions elicited    She still believes that staff may be hypnotizing her  Perceptual Disturbances: no auditory hallucinations, no visual hallucinations  Hx Risk Factors: chronic mood disorder, chronic psychotic symptoms, alcohol use  Sensorium:      Oriented to 3 spheres and to situation  Cognition: recent and remote memory grossly intact  Consciousness: alert and awake  Attention: attention span and concentration are age appropriate  Intellect: appears to be of average intelligence  Insight: improving  Judgement: improving  Motor Activity: no abnormal movements     Vitals  Temp:  [97 5 °F (36 4 °C)-97 8 °F (36 6 °C)] 97 5 °F (36 4 °C)  HR:  [78-85] 84  Resp:  [17-18] 18  BP: (103-126)/(57-82) 118/68  No intake or output data in the 24 hours ending 04/28/21 1058    Lab Results:  All [facility] Ad[facility] Reviewed  EKG showed     Current Facility-Administered Medications   Medication Dose Route Frequency Provider Last Rate    acetaminophen  650 mg Oral Q6H PRN [facility] Daina, CRNP      acetaminophen  650 mg Oral Q4H PRN [facility] Daina, CRNP      acetaminophen  975 mg Oral Q6H PRN [facility] Daina, CRNP      al mag oxide-diphenhydramine-lidocaine viscous  10 mL Swish & Swallow Q4H PRN [place], CRNP      albuterol  2 puff Inhalation Q6H PRN [facility] Daina, RENATANP      amoxicillin  500 mg Oral Q8H [facility] Woodrow Burkitt, MD      benztropine  1 mg Intramuscular Q4H PRN Max 6/day Isis Lama, CRNP      benztropine  1 mg Oral Q4H PRN Max 6/day Isis Lama, RENATANP      benztropine  1 mg Oral BID Woodrow Burkitt, MD      calcium carbonate  500 mg Oral TID PRN Álvaro Lama, CRNP      diphenhydrAMINE  25 mg Oral HS Isis Lama, CRNP      escitalopram  20 mg Oral Daily Isis Lama, CRNP      fenofibrate  145 mg Oral Daily Isis Lama, CRNP      gabapentin  600 mg Oral 4x Daily Isis Lama, CRNP      haloperidol  5 mg Oral Q6H PRN Isis Lama, RENATANP      hydrOXYzine HCL  25 mg Oral Q6H PRN Max 4/day JARED Waters      hydrOXYzine HCL  50 mg Oral Q6H PRN Max 4/day Isis Lama, CRNP      lidocaine  1 patch Topical Daily Isis Lama, JARED      LORazepam  1 mg Intramuscular Q6H PRN Courtney Suarez MD      LORazepam  0 5 mg Oral 4x Daily Courtney Suarez MD      magnesium hydroxide  30 mL Oral Daily PRN Dwana Freeze Daina, CRNP      nicotine  1 patch Transdermal Daily Joana Gardner, CRNP      OLANZapine  2 5 mg Oral Q8H PRN Joana Gardner, CRNP      OLANZapine  5 mg Oral Q3H PRN Joana Kendra, CRNP      OLANZapine  7 5 mg Oral Q3H PRN Joana Gardner, CRNP      OLANZapine  10 mg Intramuscular Q3H PRN Joana Gardner, CRNP      OLANZapine  5 mg Intramuscular Q3H PRN Joana Gardner, CRNP      OLANZapine  10 mg Oral HS Oralia Ghosh MD      OLANZapine  15 mg Oral Daily Oralia Ghosh MD      Pramox-PE-Glycerin-Petrolatum  1 application Rectal 4x Daily PRN Isis Lama, JARED      prazosin  1 mg Oral HS Joana Gardner, JARED      psyllium  1 packet Oral Daily JARED Isbell         Counseling / Coordination of Care: Total floor / unit time spent today 15 minutes  Greater than 50% of total time was spent with the patient and / or family counseling and / or somewhat receptive to supportive listening and teaching positive coping skills to deal with symptom mangement  Patient's Rights, confidentiality and exceptions to confidentiality, use of automated medical record, [facility] staff access to medical record, and consent to treatment reviewed  This note was  not shared with the patient because it might further aggravate her psychiatric condition  This note has been dictated

## 2021-04-28 NOTE — PROGRESS NOTES
Patient attended this first [facility] Psychotherapy group  She was cooperative, listened and agreeable that this would be a good group

## 2021-04-29 PROCEDURE — 99232 SBSQ HOSP IP/OBS MODERATE 35: CPT | Performed by: PHYSICIAN ASSISTANT

## 2021-04-29 RX ADMIN — LORAZEPAM 0.5 MG: 0.5 TABLET ORAL at 17:11

## 2021-04-29 RX ADMIN — LIDOCAINE 1 PATCH: 50 PATCH CUTANEOUS at 08:57

## 2021-04-29 RX ADMIN — OLANZAPINE 15 MG: 5 TABLET, FILM COATED ORAL at 08:56

## 2021-04-29 RX ADMIN — LORAZEPAM 0.5 MG: 0.5 TABLET ORAL at 11:50

## 2021-04-29 RX ADMIN — OLANZAPINE 10 MG: 10 TABLET, FILM COATED ORAL at 21:22

## 2021-04-29 RX ADMIN — BENZTROPINE MESYLATE 1 MG: 1 TABLET ORAL at 08:57

## 2021-04-29 RX ADMIN — ESCITALOPRAM OXALATE 20 MG: 10 TABLET ORAL at 08:56

## 2021-04-29 RX ADMIN — DIPHENHYDRAMINE HCL 25 MG: 25 TABLET ORAL at 21:22

## 2021-04-29 RX ADMIN — HALOPERIDOL 2 MG: 1 TABLET ORAL at 17:11

## 2021-04-29 RX ADMIN — FENOFIBRATE 145 MG: 145 TABLET ORAL at 08:56

## 2021-04-29 RX ADMIN — BENZTROPINE MESYLATE 1 MG: 1 TABLET ORAL at 13:55

## 2021-04-29 RX ADMIN — HALOPERIDOL 2 MG: 1 TABLET ORAL at 08:56

## 2021-04-29 RX ADMIN — LORAZEPAM 0.5 MG: 0.5 TABLET ORAL at 08:57

## 2021-04-29 RX ADMIN — GABAPENTIN 600 MG: 300 CAPSULE ORAL at 21:23

## 2021-04-29 RX ADMIN — PSYLLIUM HUSK 1 PACKET: 3.4 POWDER ORAL at 08:57

## 2021-04-29 RX ADMIN — GABAPENTIN 600 MG: 300 CAPSULE ORAL at 17:11

## 2021-04-29 RX ADMIN — PHENYTOIN 1 MG: 125 SUSPENSION ORAL at 21:22

## 2021-04-29 RX ADMIN — AMOXICILLIN 500 MG: 500 CAPSULE ORAL at 05:03

## 2021-04-29 RX ADMIN — GABAPENTIN 600 MG: 300 CAPSULE ORAL at 11:50

## 2021-04-29 RX ADMIN — HALOPERIDOL 5 MG: 5 TABLET ORAL at 13:02

## 2021-04-29 RX ADMIN — NICOTINE 1 PATCH: 21 PATCH, EXTENDED RELEASE TRANSDERMAL at 08:58

## 2021-04-29 RX ADMIN — BENZTROPINE MESYLATE 1 MG: 1 TABLET ORAL at 17:12

## 2021-04-29 RX ADMIN — LORAZEPAM 0.5 MG: 0.5 TABLET ORAL at 21:22

## 2021-04-29 RX ADMIN — GABAPENTIN 600 MG: 300 CAPSULE ORAL at 08:56

## 2021-04-29 NOTE — PROGRESS NOTES
04/29/21 1300   Activity/Group Checklist   Group Other (Comment)  (Art Therapy Group; Open discussion )   Attendance Attended   Attendance Duration (min) 31-45   Interactions Interacted appropriately   Affect/Mood Appropriate;Calm;Normal range   Goals Achieved Able to listen to others; Able to engage in interactions; Able to recieve feedback  (Engaged with materials; Full participation )     Patient remained engaged with art process for a majority of the session  Client interacted socially with art therapy intern and group members  Patient began to voice concerns that her brain was beginning to feel "stiff" and that she must go to the nurse station before she forgets to voice this experience  Patient also expressed concerns related to her difficulty remaining focused on a task  Patient left group early to voice concerns to nurses  Patient then was heard yelling in the hallway a short while later

## 2021-04-29 NOTE — SOCIAL WORK
Summary  SW met with patient for individual therapy  Pt calm though slight irritability at times, agreeable to meet  Pt verbose, sharing in detail of her struggles with various symptoms, very tangential, distractible, at times forgetting the original question SW asked and requiring redirection  Pt identified she is still having AH and VH, that today she heard "my ex boyfriend's voice through the vent" and that it triggered her, causing more anxiety  Pt went on to share about her PTSD and elaborated on how she struggles with yelling  "When I yell I thought that was psychosis? It isn't???" she asked  Majority of session was spent providing psycho-education on her diagnosis, both schizoaffective disorder and PTSD, since she has made multiple statements since admission of this nature  Pt very receptive to this, however as mentioned very distractible and requiring redirection due to her tangentiality  The latter half of our time together consisted of exploring her healthier coping skills, building her coping "tool box" and discussing the concept of grounding techniques  Again pt receptive, however easily distracted and at times appearing to be responding to [facility], though vague in describing if she was actually seeing things or not

## 2021-04-29 NOTE — NURSING NOTE
Susie Wayne maintained on ongoing SAFE precaution without incident on this shift   Laying in bed with eyes closed, breath even and unlabored   Continues on ABT Amoxicillin 500mg for prophylasis post dental work with no adverse reaction noted  Denies any oral pain or discomfort at this time  Q 7 minutes rounding implemented continuously   No behavioral noted   Will continue to monitor

## 2021-04-29 NOTE — PROGRESS NOTES
04/29/21 1303   Team Meeting   Meeting Type Daily Rounds   Initial Conference Date 04/29/21   Patient/Family Present   Patient Present No   Patient's Family Present No     Daily Rounds Documentation  Team Members Participating  MD Marta Pearson, NEYDA Gan, MARK ANTHONY Coburn, ANNALISE Becerril    Case reviewed  EKG done  Slept, no issues  Calmer day yesterday  3/5 groups

## 2021-04-29 NOTE — PROGRESS NOTES
04/29/21 1301   Team Meeting   Meeting Type Daily Rounds   Initial Conference Date 04/28/21   Patient/Family Present   Patient Present No   Patient's Family Present No       Daily Rounds Documentation  Team Members Participating  MD Jerardo Torres, RN  Alix Thibodeaux, LSW  Constance Hester, LSW  Wilbur Casarez, ANNALISE Baum RN    Case reviewed  5/7 groups  Labile and yelling  At one point walked out of group and later directed to go to observation room to yell  Did fine in recovery workshop

## 2021-04-29 NOTE — NURSING NOTE
Rambo Barlow has been awake, alert, and visible intermittently out in the milieu  Pt denies any depression, anxiety, a/v hallucinations, and has not verbalized any delusions  Pt stated that she feels "good" today  No yelling out or behavioral issues  Pt ate 100% supper  Attended evening wrap up group and had evening snack  Pleasant, polite, and cooperative  Pt rests in room at intervals and stays to self  Compliant with all scheduled meds when called  Continue to monitor/assess for any changes

## 2021-04-29 NOTE — PROGRESS NOTES
04/29/21 0700   Activity/Group Checklist   Group   (Coffee Talk )   Attendance Attended   Attendance Duration (min) 46-60   Interactions Interacted appropriately   Affect/Mood Appropriate;Bright;Calm;Normal range   Goals Achieved Identified feelings; Able to listen to others; Able to engage in interactions

## 2021-04-29 NOTE — PROGRESS NOTES
04/29/21 1100   Activity/Group Checklist   Group   (IMR )   Attendance Did not attend; Other (Comment)  (with Therapist )   Attendance Duration (min) 46-60   Affect/Mood ASHLEY

## 2021-04-29 NOTE — PROGRESS NOTES
Progress Note - Behavioral Health     Joseph Armas 53 y o  female MRN: [id number]   Unit/Bed#: [facility] 109-01 Encounter: [id number]    Behavior over the last 24 hours: partial improvement  Leopoldo Pies is a 52-year-old female with a history of schizoaffective disorder bipolar type who presents for psychiatric follow-up  Patient is well known to this interviewer from prior encounters during her time at [facility]  She reports doing well and feels that her psychiatric issues are slowly improving since coming to [facility]  She states, I feel happier  I am safe here, I am going to be okay   Specifically, she cites fewer psychotic outbursts, and feels that when she does lose control, it does not last as long as it has previously  Feels the Haldol has been beneficial and is tolerating well  Recent EKG demonstrated an improved QTC of 466 compared to 490 3 weeks ago  Denies SI or HI at this time  Affect certainly seems brighter relative to last month      Sleep: normal  Appetite: normal  Medication side effects: No   ROS: all other systems are negative    Mental Status Evaluation:    Appearance:  age appropriate, casually dressed, adequate grooming   Behavior:  pleasant, cooperative, calm   Speech:  normal rate and volume, fluent, clear   Mood:  improved, euthymic   Affect:  brighter   Thought Process:  organized, goal directed, linear   Associations: intact associations   Thought Content:  intrusive, paranoid thoughts   Perceptual Disturbances: auditory hallucinations still present, but less frequent, visual hallucinations still present, but less frequent   Risk Potential: Suicidal ideation - None at present  Homicidal ideation - None at present  Potential for aggression - No   Sensorium:  oriented to person, place and time/date   Memory:  recent and remote memory grossly intact   Consciousness:  alert and awake   Attention/Concentration: attention span and concentration are age appropriate   Insight:  improving   Judgment: improving   Gait/Station: normal gait/station, normal balance   Motor Activity: no abnormal movements     Vital signs in last 24 hours:    Temp:  [97 7 °F (36 5 °C)-97 8 °F (36 6 °C)] 97 8 °F (36 6 °C)  HR:  [72-89] 72  Resp:  [17-18] 17  BP: ()/(55-80) 106/56    Laboratory results: I have personally reviewed all pertinent laboratory/tests results    Most Recent Labs:   Lab Results   Component Value Date    WBC 6 80 04/09/2021    RBC 4 32 04/09/2021    HGB 13 0 04/09/2021    HCT 39 5 04/09/2021     04/09/2021    RDW 14 3 04/09/2021    NEUTROABS 7 10 (H) 03/16/2021    SODIUM 139 04/09/2021    K 5 0 04/09/2021     04/09/2021    CO2 29 04/09/2021    BUN 15 04/09/2021    CREATININE 0 58 (L) 04/09/2021    GLUC 91 04/09/2021    CALCIUM 9 6 04/09/2021    AST 21 04/09/2021    ALT 17 04/09/2021    ALKPHOS 60 04/09/2021    TP 6 9 04/09/2021    ALB 4 0 04/09/2021    TBILI 0 20 04/09/2021    CHOLESTEROL 300 (H) 03/31/2021    HDL 57 03/31/2021    TRIG 658 (H) 03/31/2021    [address]  03/31/2021      Comment:      Calculated LDL invalid, triglycerides >400 mg/dl    NONHDLC 147 08/23/2020    AMMONIA 28 10/27/2017    [id number] 3 810 04/09/2021    FREET4 0 89 03/24/2016    PREGUR negative 03/16/2021    PREGSERUM Negative 10/21/2019    HCG <2 00 04/10/2014    RPR Non-Reactive 03/31/2021    HGBA1C 5 0 08/06/2019    EAG 97 08/06/2019       Progress Toward Goals: progressing, attends groups, participates in milieu therapy, mood is stabilizing slowly, depression is improving, not as psychotic    Assessment/Plan   Principal Problem:    Schizoaffective disorder, bipolar type ([facility] )  Active Problems:    Post-traumatic stress disorder, chronic    Medical clearance for psychiatric admission    Mild intermittent asthma without complication    Ear problem, bilateral      Recommended Treatment:     Planned medication and treatment changes:     All current active medications have been reviewed  Encourage group therapy, milieu therapy and occupational therapy  Behavioral Health checks every 7 minutes    EKG results reviewed: QTc now 466; was 490 about 3 weeks ago      Continue current medicines:    Current Facility-Administered Medications   Medication Dose Route Frequency Provider Last Rate    acetaminophen  650 mg Oral Q6H PRN Kamille Lama, CRNP      acetaminophen  650 mg Oral Q4H PRN Kamille Lama, CRNP      acetaminophen  975 mg Oral Q6H PRN Kamille Lama, CRNP      al mag oxide-diphenhydramine-lidocaine viscous  10 mL Swish & Swallow Q4H PRN Kellen Olivier, CRNP      albuterol  2 puff Inhalation Q6H PRN Kamille Lama, CRNP      benztropine  1 mg Intramuscular Q4H PRN Max 6/day Isis Lama, CRNP      benztropine  1 mg Oral Q4H PRN Max 6/day Isis Lama, CRNP      benztropine  1 mg Oral BID Ashlie Calhoun MD      calcium carbonate  500 mg Oral TID PRN Kamille Lama, RENATANP      diphenhydrAMINE  25 mg Oral HS Isis Lama, CRNP      escitalopram  20 mg Oral Daily Isis Lama, CRNP      fenofibrate  145 mg Oral Daily Isis Lama, CRNP      gabapentin  600 mg Oral 4x Daily Isis Lama, CRNP      haloperidol  2 mg Oral BID Brooke Mack MD      haloperidol  5 mg Oral Q6H PRN Isis Lama, CRNP      hydrOXYzine HCL  25 mg Oral Q6H PRN Max 4/day Isis Lama, CRNP      hydrOXYzine HCL  50 mg Oral Q6H PRN Max 4/day Isis Lama, RENATANP      lidocaine  1 patch Topical Daily Isis Lama, CRNP      LORazepam  1 mg Intramuscular Q6H PRN Brooke Mack MD      LORazepam  0 5 mg Oral 4x Daily Brooke Mack MD      magnesium hydroxide  30 mL Oral Daily PRN Kamille Lama, CRNP      nicotine  1 patch Transdermal Daily Sudarshan Ny, CRNP      OLANZapine  2 5 mg Oral Q8H PRN Sudarshan Ny, CRNP      OLANZapine  5 mg Oral Q3H PRN Sudarshan Ny, CRNP      OLANZapine  7 5 mg Oral Q3H PRN Sudarshan Ny, CRNP      OLANZapine  10 mg Intramuscular Q3H PRN JARED Herrera      OLANZapine  5 mg Intramuscular Q3H PRN Donavan Briggs, JARED      OLANZapine  10 mg Oral HS Iqra Caal MD      OLANZapine  15 mg Oral Daily Luis Maldonado MD      Pramox-PE-Glycerin-Petrolatum  1 application Rectal 4x Daily PRN JARED Jones      prazosin  1 mg Oral HS JARED Herrera      psyllium  1 packet Oral Daily JARED Isbell       Risks / Benefits of Treatment:    Risks, benefits, and possible side effects of medications explained to patient and patient verbalizes understanding and agreement for treatment  Counseling / Coordination of Care:    Patient's progress discussed with staff in treatment team meeting  Medications, treatment progress and treatment plan reviewed with patient      Alysa Morris PA-C 04/29/21

## 2021-04-29 NOTE — PLAN OF CARE
Problem: Alteration in Thoughts and Perception  Goal: Agree to be compliant with medication regime, as prescribed and report medication side effects  Description: Interventions:  - Offer appropriate PRN medication and supervise ingestion; conduct AIMS, as needed   Outcome: Progressing  Goal: Attend and participate in unit activities, including therapeutic, recreational, and educational groups  Description: Interventions:  -Encourage Visitation and family involvement in care  Outcome: Progressing     Problem: Risk for Self Injury/Neglect  Goal: Refrain from harming self  Description: Interventions:  - Monitor patient closely, per order  - Develop a trusting relationship  - Supervise medication ingestion, monitor effects and side effects   Outcome: Progressing     Problem: Alteration in Thoughts and Perception  Goal: Refrain from acting on delusional thinking/internal stimuli  Description: Interventions:  - Monitor patient closely, per order   - Utilize least restrictive measures   - Set reasonable limits, give positive feedback for acceptable   - Administer medications as ordered and monitor of potential side effects  Outcome: Not Progressing

## 2021-04-29 NOTE — NURSING NOTE
Patient is about the same ,She is very somatic  She comes up with the strangest things that could be wrong with her   She continues to yell and hallucinate and then starts screaming for no reason

## 2021-04-29 NOTE — PROGRESS NOTES
04/29/21 0900   Activity/Group Checklist   Group Community meeting   Attendance Attended   Attendance Duration (min) 16-30   Interactions Interacted appropriately   Affect/Mood Appropriate;Calm;Normal range   Goals Achieved Able to listen to others; Able to engage in interactions

## 2021-04-30 LAB
ALBUMIN SERPL BCP-MCNC: 4.5 G/DL (ref 3–5.2)
ALP SERPL-CCNC: 59 U/L (ref 43–122)
ALT SERPL W P-5'-P-CCNC: 17 U/L
ANION GAP SERPL CALCULATED.3IONS-SCNC: 6 MMOL/L (ref 5–14)
AST SERPL W P-5'-P-CCNC: 25 U/L (ref 14–36)
BILIRUB SERPL-MCNC: 0.2 MG/DL
BUN SERPL-MCNC: 20 MG/DL (ref 5–25)
CALCIUM SERPL-MCNC: 9.8 MG/DL (ref 8.4–10.2)
CHLORIDE SERPL-SCNC: 99 MMOL/L (ref 97–108)
CO2 SERPL-SCNC: 33 MMOL/L (ref 22–30)
CREAT SERPL-MCNC: 0.84 MG/DL (ref 0.6–1.2)
GFR SERPL CREATININE-BSD FRML MDRD: 82 ML/MIN/1.73SQ M
GLUCOSE SERPL-MCNC: 95 MG/DL (ref 70–99)
MAGNESIUM SERPL-MCNC: 1.8 MG/DL (ref 1.6–2.3)
POTASSIUM SERPL-SCNC: 3.9 MMOL/L (ref 3.6–5)
PROT SERPL-MCNC: 7.5 G/DL (ref 5.9–8.4)
SODIUM SERPL-SCNC: 138 MMOL/L (ref 137–147)

## 2021-04-30 PROCEDURE — 80053 COMPREHEN METABOLIC PANEL: CPT | Performed by: NURSE PRACTITIONER

## 2021-04-30 PROCEDURE — 83735 ASSAY OF MAGNESIUM: CPT | Performed by: NURSE PRACTITIONER

## 2021-04-30 PROCEDURE — 99232 SBSQ HOSP IP/OBS MODERATE 35: CPT | Performed by: PSYCHIATRY & NEUROLOGY

## 2021-04-30 RX ADMIN — GABAPENTIN 600 MG: 300 CAPSULE ORAL at 08:45

## 2021-04-30 RX ADMIN — FENOFIBRATE 145 MG: 145 TABLET ORAL at 08:45

## 2021-04-30 RX ADMIN — LORAZEPAM 0.5 MG: 0.5 TABLET ORAL at 21:05

## 2021-04-30 RX ADMIN — NICOTINE 1 PATCH: 21 PATCH, EXTENDED RELEASE TRANSDERMAL at 08:46

## 2021-04-30 RX ADMIN — OLANZAPINE 15 MG: 5 TABLET, FILM COATED ORAL at 08:45

## 2021-04-30 RX ADMIN — HALOPERIDOL 2 MG: 1 TABLET ORAL at 08:46

## 2021-04-30 RX ADMIN — GABAPENTIN 600 MG: 300 CAPSULE ORAL at 17:04

## 2021-04-30 RX ADMIN — GABAPENTIN 600 MG: 300 CAPSULE ORAL at 21:06

## 2021-04-30 RX ADMIN — HALOPERIDOL 2 MG: 1 TABLET ORAL at 17:04

## 2021-04-30 RX ADMIN — PHENYTOIN 1 MG: 125 SUSPENSION ORAL at 21:06

## 2021-04-30 RX ADMIN — PSYLLIUM HUSK 1 PACKET: 3.4 POWDER ORAL at 08:45

## 2021-04-30 RX ADMIN — ESCITALOPRAM OXALATE 20 MG: 10 TABLET ORAL at 08:45

## 2021-04-30 RX ADMIN — LORAZEPAM 0.5 MG: 0.5 TABLET ORAL at 08:46

## 2021-04-30 RX ADMIN — BENZTROPINE MESYLATE 1 MG: 1 TABLET ORAL at 08:46

## 2021-04-30 RX ADMIN — HALOPERIDOL 5 MG: 5 TABLET ORAL at 12:27

## 2021-04-30 RX ADMIN — LORAZEPAM 0.5 MG: 0.5 TABLET ORAL at 17:04

## 2021-04-30 RX ADMIN — GABAPENTIN 600 MG: 300 CAPSULE ORAL at 12:15

## 2021-04-30 RX ADMIN — LIDOCAINE 1 PATCH: 50 PATCH CUTANEOUS at 08:45

## 2021-04-30 RX ADMIN — LORAZEPAM 0.5 MG: 0.5 TABLET ORAL at 12:15

## 2021-04-30 RX ADMIN — BENZTROPINE MESYLATE 1 MG: 1 TABLET ORAL at 17:04

## 2021-04-30 RX ADMIN — OLANZAPINE 10 MG: 10 TABLET, FILM COATED ORAL at 21:05

## 2021-04-30 RX ADMIN — BENZTROPINE MESYLATE 1 MG: 1 TABLET ORAL at 12:27

## 2021-04-30 RX ADMIN — DIPHENHYDRAMINE HCL 25 MG: 25 TABLET ORAL at 21:05

## 2021-04-30 NOTE — PROGRESS NOTES
04/30/21 0700   Activity/Group Checklist   Group   (Coffee Talk )   Attendance Attended   Attendance Duration (min) 31-45   Interactions Interacted appropriately   Affect/Mood Appropriate;Bright;Calm;Normal range   Goals Achieved Identified feelings; Able to listen to others; Able to engage in interactions

## 2021-04-30 NOTE — PROGRESS NOTES
Psychiatry Progress Note [facility] 53 y o  female MRN: [id number]  Unit/Bed#: [facility] 109-01 Encounter: [id number]  Code Status: Level 1 - Full Code    PCP: Tanna Estrada DO    Date of Admission:  4/7/2021 1435   Date of Service:  04/30/21    Patient Active Problem List   Diagnosis    Schizoaffective disorder, bipolar type ([facility] [address] )    Uncomplicated alcohol dependence ([facility] )    Suicidal behavior    LYNN (generalized anxiety disorder)    Slow transit constipation    Deficiency of vitamin B    Degenerative disc disease, lumbar    Post-traumatic stress disorder, chronic    Lower extremity weakness    Generalized abdominal pain    Non-intractable vomiting    Medical clearance for psychiatric admission    Carbuncle    [facility]    Mild intermittent asthma without complication    Tobacco abuse    Ear problem, bilateral       Review of systems:      Unremarkable but comes up with some somatic symptoms off and with no real organic basis  Diagnosis:      Schizoaffective bipolar, alcohol use episodic in early remission in controlled  Environment  ,Assessment   Overall Status:       Still yells and screams but less often and still somewhat bizarre at times being silverman labile off and on but more pleasant   Certification Statement: The patient will continue to require additional inpatient hospital stay due to ongoing mood swings paranoia   Acceptance by patient:  accepting    Hopefulness in recovery:  Living in a group home   Understanding of medications: has good understanding   Involved in reintegration process:  Adjusting to the unit   Trusting in relationship with psychiatrist:  trusting   Justification for dual anti-psychotics:  Not applicable   Side effects from treatment: none    Medication changes    add Haldol 2 mg twice a day which has been effective was p r n  for the voices  Non-pharmacological treatments   Continue with individual, group, milieu and occupational therapy using recovery principles and psycho-education about accepting illness and the need for treatment  Safety   Safety and communication plan established to target dynamic risk factors discussed above  Discharge Plan     most likely to a group home with the act team    Interval Progress    patient is still yelling and screaming at times but less often and still believes that she is having  "psychotic" episodes but has needed p r n  meds 2 times yesterday   Her EKG showed QTC decreased to 466 from 490  She is eating and sleeping well  She has been attending more groups and more friendly and pleasant when approached and her affect is brighter and is attending more groups  Still continues to have some delusions about staff doing hypnosis on her  She has been her attending groups appropriately but periodically excuses herself because of bizarre experiences but able to be redirected  She has not been aggressive   Vocalizing hearing voices of her ex BF through the vent      Sleep:      good   appetite:    good   compliance with medications all:  good   Side  Effects:         None today   significant events:     Paranoid preoccupied pleasant friendly progressing and still yelling at times but less  often   group attendance:    Attending 6/6 of the groups    Mental Status Exam  Appearance: age appropriate, casually dressed, adequate grooming, looks older than stated age, underweight   Friendly and pleasant today   Behavior: normal, pleasant, cooperative, appears anxious  Speech: fluent, coherent, hypertalkative,   Circumstantial pressured    More redirectable  Mood: depressed, anxious, labile, euphoric  Affect: inappropriate, labile, reactive, slightly brighter  Thought Process: tends to be pressured and circumstantial and preoccupied perseverating on discharge    Thought Content: some paranoia, mild paranoia, ideas of reference, but does appear as if paranoid  No current suicidal homicidal thoughts intent or plans verbalized  No phobias obsessions compulsions or distorted body perceptions elicited  Continues to talk about staff hypnotizing her at times  Perceptual Disturbances: no auditory hallucinations, no visual hallucinations but periodically admits to hearing voices and seeing things move when asked but not at the time of the interview  Hx Risk Factors: chronic mood disorder, chronic psychotic symptoms, alcohol use  Sensorium:     Oriented to 3 spheres and to situation  Cognition: recent and remote memory grossly intact  Consciousness: alert and awake  Attention: attention span and concentration are age appropriate  Intellect: appears to be of average intelligence  Insight: improving  Judgement: improving  Motor Activity: no abnormal movements     Vitals  Temp:  [97 5 °F (36 4 °C)-97 8 °F (36 6 °C)] 97 8 °F (36 6 °C)  HR:  [73-97] 92  Resp:  [16-18] 18  BP: (103-111)/(64-72) 103/64  No intake or output data in the 24 hours ending 04/30/21 0748    Lab Results:  [facility]  EKG showed     Current Facility-Administered Medications   Medication Dose Route Frequency Provider Last Rate    acetaminophen  650 mg Oral Q6H PRN JARED Isaac      acetaminophen  650 mg Oral Q4H PRN JARED Isaac      acetaminophen  975 mg Oral Q6H PRN JARED Jones      al mag oxide-diphenhydramine-lidocaine viscous  10 mL Swish & Swallow Q4H PRN JARED Esparza      albuterol  2 puff Inhalation Q6H PRN JARED Isaac      benztropine  1 mg Intramuscular Q4H PRN Max 6/day JARED Jones      benztropine  1 mg Oral Q4H PRN Max 6/day JARED Jones      benztropine  1 mg Oral BID Ashlie Calhoun MD      calcium carbonate  500 mg Oral TID PRN JARED Isaac      diphenhydrAMINE  25 mg Oral HS JARED Jones      escitalopram  20 mg Oral Daily Isis Lama, CRNP      fenofibrate  145 mg Oral Daily Isis Lama, CRNP      gabapentin  600 mg Oral 4x Daily Isis Lama, CRNP      haloperidol  2 mg Oral BID Padmini Nelson MD      haloperidol  5 mg Oral Q6H PRN Edward Lama, CRNP      hydrOXYzine HCL  25 mg Oral Q6H PRN Max 4/day Isis Lama, CRNP      hydrOXYzine HCL  50 mg Oral Q6H PRN Max 4/day Isis Lama, CRNP      lidocaine  1 patch Topical Daily Isis Lama, CRNP      LORazepam  1 mg Intramuscular Q6H PRN Padmini Nelson MD      LORazepam  0 5 mg Oral 4x Daily Padmini Nelson MD      magnesium hydroxide  30 mL Oral Daily PRN Edward Lama, CRNP      nicotine  1 patch Transdermal Daily Ousmane Soto, JARED      OLANZapine  2 5 mg Oral Q8H PRN Ousmane Soto, CRNP      OLANZapine  5 mg Oral Q3H PRN Ousmane Soto, RENATANP      OLANZapine  7 5 mg Oral Q3H PRN Ousmane Soto, CRNP      OLANZapine  10 mg Intramuscular Q3H PRN Ousmane Soto, CRNP      OLANZapine  5 mg Intramuscular Q3H PRN Ousmane Soto, RENATANP      OLANZapine  10 mg Oral HS Adolfo Counts, MD      OLANZapine  15 mg Oral Daily Adolfo Counts, MD      Pramox-PE-Glycerin-Petrolatum  1 application Rectal 4x Daily PRN Isis Lama, CRNP      prazosin  1 mg Oral HS Ousmane Soto, JARED      psyllium  1 packet Oral Daily JARED Isbell         Counseling / Coordination of Care: Total floor / unit time spent today 15 minutes  Greater than 50% of total time was spent with the patient and / or family counseling and / or somewhat receptive to supportive listening and teaching positive coping skills to deal with symptom mangement  Patient's Rights, confidentiality and exceptions to confidentiality, use of automated medical record, Howie Atkinson staff access to medical record, and consent to treatment reviewed     This note was  not shared with the patient because it might further aggravate her psychiatric condition  This note has been dictated

## 2021-04-30 NOTE — NURSING NOTE
Quiet, cooperative and visible intermittently  Med and meal compliant  Denies depression, anxiety, SI, HI and pain  C/o leg cramps, prn cogentin given  PRN Haldol give for agitation r/t auditory hallucinations  Behaviors controlled  One major outburst heard this shift  Patient maintained on safe precautions without incident  Will continue to monitor progress in recovery program     Patient seen by [facility] for leg cramps      Addendum:  Need CMP and Mag level drawn / new order from [facility]

## 2021-04-30 NOTE — NURSING NOTE
Visible most of shift, med and meal compliant  Attended group on the deck  No behaviors or outbursts noted

## 2021-04-30 NOTE — PLAN OF CARE
Problem: Ineffective Coping  Goal: Participates in unit activities  Description: Interventions:  - Provide therapeutic environment   - Provide required programming   - Redirect inappropriate behaviors   Outcome: Progressing     Problem: Alteration in Thoughts and Perception  Goal: Attend and participate in unit activities, including therapeutic, recreational, and educational groups  Description: Interventions:  -Encourage Visitation and family involvement in care  Outcome: Progressing     Problem: Alteration in Thoughts and Perception  Goal: Verbalize thoughts and feelings  Description: Interventions:  - Promote a nonjudgmental and trusting relationship with the patient through active listening and therapeutic communication  - Assess patient's level of functioning, behavior and potential for risk  - Engage patient in 1 on 1 interactions  - Encourage patient to express fears, feelings, frustrations, and discuss symptoms    - [place] patient to reality, help patient recognize reality-based thinking   - Administer medications as ordered and assess for potential side effects  - Provide the patient education related to the signs and symptoms of the illness and desired effects of prescribed medications  Outcome: Progressing    Patient completed Goal Card for the week of 5/3/21    Goals: Attend all groups              No outbursts              Read my workbooks

## 2021-04-30 NOTE — NURSING NOTE
Harris maintained on ongoing SAFE precaution without incident on this shift   Laying in bed with eyes closed, breath even and unlabored   Q 7 minutes rounding implemented continuously  Harris woke up at [phone] requesting PRN haldol for insomnia " I want to go back to sleep"  Reeducate on usage of haldol and hospital policy on sleep aid  She retuen back to bed after receiving ice water   No behavioral noted   Will continue to monitor

## 2021-04-30 NOTE — NURSING NOTE
Tamika Marrero has been visible  No yelling noted this shift  Patient was isolative to self  Denies all psych symptoms  She is medication and meal compliant  Consumed 100% of dinner  Patient attended wrap up group this shift  Continue to monitor

## 2021-04-30 NOTE — PROGRESS NOTES
04/30/21 0900   Activity/Group Checklist   Group Community meeting   Attendance Attended   Attendance Duration (min) 16-30   Interactions Interacted appropriately   Affect/Mood Appropriate;Bright;Calm   Goals Achieved Identified feelings; Able to listen to others; Able to engage in interactions

## 2021-04-30 NOTE — PLAN OF CARE
Problem: Alteration in Thoughts and Perception  Goal: Agree to be compliant with medication regime, as prescribed and report medication side effects  Description: Interventions:  - Offer appropriate PRN medication and supervise ingestion; conduct AIMS, as needed   Outcome: Progressing  Goal: Attend and participate in unit activities, including therapeutic, recreational, and educational groups  Description: Interventions:  -Encourage Visitation and family involvement in care  Outcome: Progressing  Goal: Complete daily ADLs, including personal hygiene independently, as able  Description: Interventions:  - Observe, teach, and assist patient with ADLS  - Monitor and promote a balance of rest/activity, with adequate nutrition and elimination   Outcome: Progressing     Problem: Ineffective Coping  Goal: Free from restraint events  Description: - Utilize least restrictive measures   - Provide behavioral interventions   - Redirect inappropriate behaviors   Outcome: Progressing

## 2021-05-01 PROCEDURE — 99232 SBSQ HOSP IP/OBS MODERATE 35: CPT | Performed by: REGISTERED NURSE

## 2021-05-01 RX ORDER — HALOPERIDOL 1 MG/1
2 TABLET ORAL 2 TIMES DAILY
Status: DISCONTINUED | OUTPATIENT
Start: 2021-05-01 | End: 2021-05-02

## 2021-05-01 RX ADMIN — OLANZAPINE 10 MG: 10 TABLET, FILM COATED ORAL at 21:43

## 2021-05-01 RX ADMIN — BENZTROPINE MESYLATE 1 MG: 1 TABLET ORAL at 17:36

## 2021-05-01 RX ADMIN — LORAZEPAM 0.5 MG: 0.5 TABLET ORAL at 21:42

## 2021-05-01 RX ADMIN — ESCITALOPRAM OXALATE 20 MG: 10 TABLET ORAL at 08:11

## 2021-05-01 RX ADMIN — NICOTINE 1 PATCH: 21 PATCH, EXTENDED RELEASE TRANSDERMAL at 08:13

## 2021-05-01 RX ADMIN — GABAPENTIN 600 MG: 300 CAPSULE ORAL at 17:36

## 2021-05-01 RX ADMIN — HALOPERIDOL 5 MG: 5 TABLET ORAL at 15:09

## 2021-05-01 RX ADMIN — DIPHENHYDRAMINE HCL 25 MG: 25 TABLET ORAL at 21:43

## 2021-05-01 RX ADMIN — LORAZEPAM 0.5 MG: 0.5 TABLET ORAL at 11:50

## 2021-05-01 RX ADMIN — FENOFIBRATE 145 MG: 145 TABLET ORAL at 08:12

## 2021-05-01 RX ADMIN — ACETAMINOPHEN 650 MG: 325 TABLET ORAL at 11:50

## 2021-05-01 RX ADMIN — LIDOCAINE 1 PATCH: 50 PATCH CUTANEOUS at 08:13

## 2021-05-01 RX ADMIN — GABAPENTIN 600 MG: 300 CAPSULE ORAL at 21:43

## 2021-05-01 RX ADMIN — GABAPENTIN 600 MG: 300 CAPSULE ORAL at 08:11

## 2021-05-01 RX ADMIN — PSYLLIUM HUSK 1 PACKET: 3.4 POWDER ORAL at 08:13

## 2021-05-01 RX ADMIN — PHENYTOIN 1 MG: 125 SUSPENSION ORAL at 21:43

## 2021-05-01 RX ADMIN — GABAPENTIN 600 MG: 300 CAPSULE ORAL at 11:50

## 2021-05-01 RX ADMIN — OLANZAPINE 15 MG: 5 TABLET, FILM COATED ORAL at 08:10

## 2021-05-01 RX ADMIN — ALBUTEROL SULFATE 2 PUFF: 90 AEROSOL, METERED RESPIRATORY (INHALATION) at 14:47

## 2021-05-01 RX ADMIN — HALOPERIDOL 2 MG: 1 TABLET ORAL at 08:09

## 2021-05-01 RX ADMIN — BENZTROPINE MESYLATE 1 MG: 1 TABLET ORAL at 08:10

## 2021-05-01 RX ADMIN — LORAZEPAM 0.5 MG: 0.5 TABLET ORAL at 08:12

## 2021-05-01 RX ADMIN — HALOPERIDOL 2 MG: 1 TABLET ORAL at 21:44

## 2021-05-01 RX ADMIN — LORAZEPAM 0.5 MG: 0.5 TABLET ORAL at 17:36

## 2021-05-01 NOTE — PROGRESS NOTES
05/01/21 1459   Team Meeting   Meeting Type Daily Rounds   Initial Conference Date 04/30/21   Patient/Family Present   Patient Present No   Patient's Family Present No       Daily Rounds Documentation  Team Members Participating  MD Fina Francisco, LPN Alfred Soulier, W  Emma Loving, W  Daniele Daniels, ANNALISE    Case reviewed  Multiple PRNS; episodes of yelling, irritable edge  Slept all night  Engaged well in therapy, when speaking with SW 1:1 reports AH, VH, and described psychotic episodes of "when I yell   " [place] more about PTSD and her diagnosis per her request

## 2021-05-01 NOTE — PROGRESS NOTES
Progress Note - Behavioral Health   Jayjay Zhang 53 y o  female MRN: [id number]  [facility] Encounter: [id number]    Assessment/Plan   Principal Problem:    Schizoaffective disorder, bipolar type ([address] )  Active Problems:    Post-traumatic stress disorder, chronic    Medical clearance for psychiatric admission    Mild intermittent asthma without complication    Ear problem, bilateral      Subjective:Patient was seen today for continuation of care, records reviewed and  patient was discussed with the morning case review team       Anthony Harley was seen today in her room  Patient denies endorsing any suicidal or homicidal ideation  Does not seem to be experiencing any manic symptoms  She does endorse having voices yesterday, but none reported today  She also reports smelling things but did elaborate any further  Anthony Harley remains medication compliant  Denies any side effects from medications  Anthony Harley reported that she feels less angry, less depressed and mood swings are improving  Depression rated 3-4/10 on scale 0-10 with 10 being worse symptoms  Anxiety reported as 1-2/10 on same scale  Anthony Harley reports that at discharge she will be going to a group home  She stated that she looks forward to going to a group home where "other people have mental health issues"      Psychiatric Review of Systems:    Sleep: normal  Appetite: normal  Medication side effects: No   ROS: no complaints, all other systems are negative    Vitals:  Vitals:    05/01/21 0700   BP: 115/78   Pulse: 84   Resp: 16   Temp: (!) 97 3 °F (36 3 °C)       Mental Status Evaluation:    Appearance:  age appropriate, casually dressed   Behavior:  slightly guarded, but pleasant during visit   Speech:  normal rate and volume   Mood:  depressed   Affect:  appropriate   Thought Process:  logical   Associations: circumstantial associations   Thought Content:  some paranoia   Perceptual Disturbances: no auditory hallucinations, reports smelling things at times, did not elaborate   Risk Potential: Suicidal ideation - None  Homicidal ideation - None  Potential for aggression - No   Sensorium:  oriented to person, place and time/date   Memory:  recent and remote memory grossly intact   Consciousness:  alert and awake   Attention: attention span and concentration are age appropriate   Insight:  limited   Judgment: limited   Gait/Station: normal gait/station   Motor Activity: no abnormal movements     Laboratory results:    I have personally reviewed all pertinent laboratory/tests results  Most Recent Labs:   Lab Results   Component Value Date    WBC 6 80 04/09/2021    RBC 4 32 04/09/2021    HGB 13 0 04/09/2021    HCT 39 5 04/09/2021     04/09/2021    RDW 14 3 04/09/2021    NEUTROABS 7 10 (H) 03/16/2021    SODIUM 138 04/30/2021    K 3 9 04/30/2021    CL 99 04/30/2021    CO2 33 (H) 04/30/2021    BUN 20 04/30/2021    CREATININE 0 84 04/30/2021    GLUC 95 04/30/2021    GLUF 91 04/09/2021    CALCIUM 9 8 04/30/2021    AST 25 04/30/2021    ALT 17 04/30/2021    ALKPHOS 59 04/30/2021    TP 7 5 04/30/2021    ALB 4 5 04/30/2021    TBILI 0 20 04/30/2021    CHOLESTEROL 300 (H) 03/31/2021    HDL 57 03/31/2021    TRIG 658 (H) 03/31/2021    [facility]  03/31/2021      Comment:      Calculated LDL invalid, triglycerides >400 mg/dl    NONHDLC 147 08/23/2020    AMMONIA 28 10/27/2017    [id number] 3 810 04/09/2021    FREET4 0 89 03/24/2016    PREGUR negative 03/16/2021    PREGSERUM Negative 10/21/2019    HCG <2 00 04/10/2014    RPR Non-Reactive 03/31/2021    HGBA1C 5 0 08/06/2019    EAG 97 08/06/2019       Progress Toward Goals:     Improving slowly  Natalia Triplett reports that discharge planning will be to a group home      Recommended Treatment:     All current active medications have been reviewed  Encourage group therapy, milieu therapy and occupational therapy  Behavioral Health checks every 7 minutes  Observe progress over the weekend  Continue current medications:  Current Facility-Administered Medications   Medication Dose Route Frequency Provider Last Rate    acetaminophen  650 mg Oral Q6H PRN Hero Quince Lodics, CRNP      acetaminophen  650 mg Oral Q4H PRN Hero Quince Lodics, CRNP      acetaminophen  975 mg Oral Q6H PRN Hero Quince Lodics, CRNP      al mag oxide-diphenhydramine-lidocaine viscous  10 mL Swish & Swallow Q4H PRN Iglesia Almeida, CRNP      albuterol  2 puff Inhalation Q6H PRN Hero Quince Germanics, CRNP      benztropine  1 mg Intramuscular Q4H PRN Max 6/day Isis Lama, CRNP      benztropine  1 mg Oral Q4H PRN Max 6/day Iiss Lama, CRNP      benztropine  1 mg Oral BID Evelin Avila MD      calcium carbonate  500 mg Oral TID PRN Hero Quince Germanics, CRNP      diphenhydrAMINE  25 mg Oral HS Isis Lama, CRNP      escitalopram  20 mg Oral Daily Isis Lama, CRNP      fenofibrate  145 mg Oral Daily sIis Lama, CRNP      gabapentin  600 mg Oral 4x Daily Isis Lama, CRNP      haloperidol  2 mg Oral BID Carrie Barrera MD      haloperidol  5 mg Oral Q6H PRN Hero Nehemias Porterics, CRNP      hydrOXYzine HCL  25 mg Oral Q6H PRN Max 4/day Isis Lama, CRNP      hydrOXYzine HCL  50 mg Oral Q6H PRN Max 4/day Isis Lama, CRNP      lidocaine  1 patch Topical Daily Isis Lama, CRNP      LORazepam  1 mg Intramuscular Q6H PRN Carrie Barrera MD      LORazepam  0 5 mg Oral 4x Daily Carrie Barrera MD      magnesium hydroxide  30 mL Oral Daily PRN Hero Nehemias Germanics, CRNP      nicotine  1 patch Transdermal Daily Dakota Qureshi, CRNP      OLANZapine  2 5 mg Oral Q8H PRN Dakota Qureshi, CRNP      OLANZapine  5 mg Oral Q3H PRN Dakota Qureshi, CRNP      OLANZapine  7 5 mg Oral Q3H PRN Dakota Qureshi, CRNP      OLANZapine  10 mg Intramuscular Q3H PRN Dakota Qureshi, CRNP      OLANZapine  5 mg Intramuscular Q3H PRN Dakota Qureshi, CRNP      OLANZapine  10 mg Oral HS Camilo Carrillo MD      OLANZapine  15 mg Oral Daily Iqra Caal, MD      Pramox-PE-Glycerin-Petrolatum  1 application Rectal 4x Daily PRN JARED Jones      prazosin  1 mg Oral HS JARED Morales      psyllium  1 packet Oral Daily JARED Isbell         Risks / Benefits of Treatment:     Risks, benefits, and possible side effects of medications explained to patient and patient verbalizes understanding and agreement for treatment  Counseling / Coordination of Care:     Patient's progress reviewed with nursing staff  Medications, treatment progress and treatment plan reviewed with patient  Supportive counseling provided to the patient          JARED Benson

## 2021-05-01 NOTE — NURSING NOTE
Alert, cooperative and visible intermittently  No SI or HI noted  Denies depression, anxiety and pain  Attended community meeting, life skills and journaling  Consumed 100 % of breakfast and 100% of lunch  Took all medication without prompting  Maintained on safe precautions without incident   Will continue to monitor progress and recovery program

## 2021-05-01 NOTE — NURSING NOTE
Pt at nurses station yelling stating staff is refusing to give her what she wants and pacing around nurse's station  Unable to redirect  PRN haldol administered @ [phone]

## 2021-05-01 NOTE — PLAN OF CARE
Problem: Alteration in Thoughts and Perception  Goal: Refrain from acting on delusional thinking/internal stimuli  Description: Interventions:  - Monitor patient closely, per order   - Utilize least restrictive measures   - Set reasonable limits, give positive feedback for acceptable   - Administer medications as ordered and monitor of potential side effects  Outcome: Not Progressing  Goal: Agree to be compliant with medication regime, as prescribed and report medication side effects  Description: Interventions:  - Offer appropriate PRN medication and supervise ingestion; conduct AIMS, as needed   Outcome: Progressing

## 2021-05-01 NOTE — NURSING NOTE
Alert, cooperative and visible intermittently  Consumed 100% of dinner  Took all medication without prompting  Maintained on safe precautions without incident

## 2021-05-02 PROCEDURE — 99232 SBSQ HOSP IP/OBS MODERATE 35: CPT | Performed by: REGISTERED NURSE

## 2021-05-02 RX ORDER — HALOPERIDOL 1 MG/1
2 TABLET ORAL 2 TIMES DAILY
Status: DISCONTINUED | OUTPATIENT
Start: 2021-05-02 | End: 2021-05-03

## 2021-05-02 RX ADMIN — LORAZEPAM 0.5 MG: 0.5 TABLET ORAL at 11:51

## 2021-05-02 RX ADMIN — GABAPENTIN 600 MG: 300 CAPSULE ORAL at 17:05

## 2021-05-02 RX ADMIN — NICOTINE 1 PATCH: 21 PATCH, EXTENDED RELEASE TRANSDERMAL at 09:00

## 2021-05-02 RX ADMIN — ESCITALOPRAM OXALATE 20 MG: 10 TABLET ORAL at 08:35

## 2021-05-02 RX ADMIN — PHENYTOIN 1 MG: 125 SUSPENSION ORAL at 21:17

## 2021-05-02 RX ADMIN — LORAZEPAM 0.5 MG: 0.5 TABLET ORAL at 08:35

## 2021-05-02 RX ADMIN — LORAZEPAM 0.5 MG: 0.5 TABLET ORAL at 17:05

## 2021-05-02 RX ADMIN — BENZTROPINE MESYLATE 1 MG: 1 TABLET ORAL at 08:35

## 2021-05-02 RX ADMIN — DIPHENHYDRAMINE HCL 25 MG: 25 TABLET ORAL at 21:17

## 2021-05-02 RX ADMIN — OLANZAPINE 15 MG: 5 TABLET, FILM COATED ORAL at 08:35

## 2021-05-02 RX ADMIN — GABAPENTIN 600 MG: 300 CAPSULE ORAL at 21:17

## 2021-05-02 RX ADMIN — HALOPERIDOL 2 MG: 1 TABLET ORAL at 17:05

## 2021-05-02 RX ADMIN — CALCIUM CARBONATE (ANTACID) CHEW TAB 500 MG 500 MG: 500 CHEW TAB at 11:03

## 2021-05-02 RX ADMIN — PSYLLIUM HUSK 1 PACKET: 3.4 POWDER ORAL at 08:36

## 2021-05-02 RX ADMIN — BENZTROPINE MESYLATE 1 MG: 1 TABLET ORAL at 17:05

## 2021-05-02 RX ADMIN — OLANZAPINE 5 MG: 5 TABLET, ORALLY DISINTEGRATING ORAL at 06:33

## 2021-05-02 RX ADMIN — ACETAMINOPHEN 975 MG: 325 TABLET ORAL at 11:52

## 2021-05-02 RX ADMIN — HALOPERIDOL 2 MG: 1 TABLET ORAL at 08:35

## 2021-05-02 RX ADMIN — FENOFIBRATE 145 MG: 145 TABLET ORAL at 08:35

## 2021-05-02 RX ADMIN — GABAPENTIN 600 MG: 300 CAPSULE ORAL at 11:52

## 2021-05-02 RX ADMIN — LIDOCAINE 1 PATCH: 50 PATCH CUTANEOUS at 08:36

## 2021-05-02 RX ADMIN — GABAPENTIN 600 MG: 300 CAPSULE ORAL at 08:36

## 2021-05-02 RX ADMIN — LORAZEPAM 0.5 MG: 0.5 TABLET ORAL at 21:17

## 2021-05-02 RX ADMIN — OLANZAPINE 10 MG: 10 TABLET, FILM COATED ORAL at 21:18

## 2021-05-02 NOTE — PROGRESS NOTES
Progress Note - Behavioral Health   Mahalia Ormond 53 y o  female MRN: [id number]  [facility] Encounter: [id number]    Assessment/Plan   Principal Problem:    Schizoaffective disorder, bipolar type ([address] )  Active Problems:    Post-traumatic stress disorder, chronic    Medical clearance for psychiatric admission    Mild intermittent asthma without complication    Ear problem, bilateral      Subjective:Patient was seen today for continuation of care, records reviewed and  patient was discussed with charge nurse  Semaj presented in her room this morning  She was pleasant and cooperative with this provider  She denies any depressive or anxiety symptoms  After reviewing notes from yesterday afternoon Amy Hatch had a period of irritability and required Haldol prn  This morning she had another irritable episode and requested prn medication (Zyprexa)  Patient denies endorsing any suicidal or homicidal ideation  Does not seem to be experiencing  psychotic symptoms during our encounter but mood remains labile  Amy Hatch is taking medications as prescribed  She inquired about increasing Haldol to TID and it was recommended to discuss this request with Dr Tess Pastor tomorrow  Yesterday nursing staff asked if Haldol BID dosing could be changed from 1800 to bedtime per client request  This changed was made, but after further review of chart the medication changed was modified back to original dosing times 0900 and 1800 (has Zyprexa 15 mg po at HS)  Reviewed this change with Amy Hatch and she was in agreement  Denies any side effects from medications with exception of leg cramping and tightness in the evening  She was encouraged to utilize prn Cogentin and discuss with her attending psychiatrist tomorrow  Nursing made CRNP aware that CO2 level had increased from 29 to 33 with latest lab work from 4/29/2021  Amy Hatch does not have any respiratory complaints at this time   CRNP did reorder CMP to be drawn in am  Nursing communication sent to follow up with medical if there are any abnormal lab values  Psychiatric Review of Systems:    Sleep: normal  Appetite: normal, "too good"  Medication side effects: reports muscle tightness in legs in evening   ROS: all other systems are negative, reports leg muscle tightness in evening    Vitals:  Vitals:    05/02/21 0704   BP: 114/68   Pulse: 77   Resp: 16   Temp: (!) 97 2 °F (36 2 °C)       Mental Status Evaluation:    Appearance:  age appropriate, dressed appropriately   Behavior:  pleasant, cooperative   Speech:  normal rate and volume   Mood:  labile   Affect:  normal range and intensity   Thought Process:  logical   Associations: intact associations   Thought Content:  no overt delusions   Perceptual Disturbances: no hallucinations reported to provider today   Risk Potential: Suicidal ideation - None  Homicidal ideation - None  Potential for aggression - No   Sensorium:  oriented to person, place and time/date   Memory:  recent and remote memory grossly intact   Consciousness:  alert and awake   Attention: attention span and concentration are age appropriate   Insight:  fair   Judgment: fair   Gait/Station: normal gait/station   Motor Activity: no abnormal movements     Laboratory results:    I have personally reviewed all pertinent laboratory/tests results    Most Recent Labs:   Lab Results   Component Value Date    WBC 6 80 04/09/2021    RBC 4 32 04/09/2021    HGB 13 0 04/09/2021    HCT 39 5 04/09/2021     04/09/2021    RDW 14 3 04/09/2021    NEUTROABS 7 10 (H) 03/16/2021    SODIUM 138 04/30/2021    K 3 9 04/30/2021    CL 99 04/30/2021    CO2 33 (H) 04/30/2021    BUN 20 04/30/2021    CREATININE 0 84 04/30/2021    GLUC 95 04/30/2021    GLUF 91 04/09/2021    CALCIUM 9 8 04/30/2021    AST 25 04/30/2021    ALT 17 04/30/2021    ALKPHOS 59 04/30/2021    TP 7 5 04/30/2021    ALB 4 5 04/30/2021    TBILI 0 20 04/30/2021    CHOLESTEROL 300 (H) 03/31/2021    HDL 57 03/31/2021    TRIG 658 (H) 03/31/2021    [facility]  03/31/2021      Comment:      Calculated LDL invalid, triglycerides >400 mg/dl    NONHDLC 147 08/23/2020    AMMONIA 28 10/27/2017    KCG4IUBMEKTN 3 810 04/09/2021    FREET4 0 89 03/24/2016    PREGUR negative 03/16/2021    PREGSERUM Negative 10/21/2019    HCG <2 00 04/10/2014    RPR Non-Reactive 03/31/2021    HGBA1C 5 0 08/06/2019    EAG 97 08/06/2019       Progress Toward Goals:     Progressing     Recommended Treatment:     All current active medications have been reviewed  Encourage group therapy, milieu therapy and occupational therapy  Behavioral Health checks every 7 minutes  Will change Haldol back to 0900 and 1800 and encouraged to utilize as needed Cogentin for muscle leg tightness  Will order CMP for am, nursing inquired about CO2 level increasing from 29 to 33  Current Facility-Administered Medications   Medication Dose Route Frequency Provider Last Rate    acetaminophen  650 mg Oral Q6H PRN Gwenevere Slipper Lodics, CRNP      acetaminophen  650 mg Oral Q4H PRN Gwenevere Slipper Lodics, CRNP      acetaminophen  975 mg Oral Q6H PRN Gwenevere Slipper Lodics, CRNP      al mag oxide-diphenhydramine-lidocaine viscous  10 mL Swish & Swallow Q4H PRN Brenda Tang, CRNP      albuterol  2 puff Inhalation Q6H PRN Gwenevere Slipper Lodics, CRNP      benztropine  1 mg Intramuscular Q4H PRN Max 6/day Isis Lama, CRNP      benztropine  1 mg Oral Q4H PRN Max 6/day Isis Lama, CRNP      benztropine  1 mg Oral BID Ted Gallagher MD      calcium carbonate  500 mg Oral TID PRN Gwenevere Slipper Lodics, CRNP      diphenhydrAMINE  25 mg Oral HS Isis Lama, CRNP      escitalopram  20 mg Oral Daily Isis Lama, CRNP      fenofibrate  145 mg Oral Daily Isis Lama, CRNP      gabapentin  600 mg Oral 4x Daily Isis Lama, CRNP      haloperidol  2 mg Oral BID Jake Muhammad, CRNP      haloperidol  5 mg Oral Q6H PRN Isis Lama, CRNP      hydrOXYzine HCL  25 mg Oral Q6H PRN Max 4/day Eloise Lama, JARED      hydrOXYzine HCL  50 mg Oral Q6H PRN Max 4/day Lovena Dear Daina, RENATANP      lidocaine  1 patch Topical Daily Isis Lama, JARED      LORazepam  1 mg Intramuscular Q6H PRN Ranjeet Castrejon MD      LORazepam  0 5 mg Oral 4x Daily Ranjeet Castrejon MD      magnesium hydroxide  30 mL Oral Daily PRN Lovena Dear Daina, CRNP      nicotine  1 patch Transdermal Daily Jessica Willie, JARED      OLANZapine  2 5 mg Oral Q8H PRN Jessica Tapia, CRNP      OLANZapine  5 mg Oral Q3H PRN Jessica Willie, CRNP      OLANZapine  7 5 mg Oral Q3H PRN Jessica Willie, CRNP      OLANZapine  10 mg Intramuscular Q3H PRN Jessica Willie, CRNP      OLANZapine  5 mg Intramuscular Q3H PRN Jessica Willie, CRNP      OLANZapine  10 mg Oral HS Iqra Caal MD      OLANZapine  15 mg Oral Daily Starleen Crigler, MD      Pramox-PE-Glycerin-Petrolatum  1 application Rectal 4x Daily PRN Isis Lama, JARED      prazosin  1 mg Oral HS Jessica Tapia, JARED      psyllium  1 packet Oral Daily JARED Isbell         Risks / Benefits of Treatment:     Risks, benefits, and possible side effects of medications explained to patient and patient verbalizes understanding and agreement for treatment  encouraged to utilize prn Cogentin as needed    Counseling / Coordination of Care:     Patient's progress reviewed with nursing staff  Medications, treatment progress and treatment plan reviewed with patient  Supportive counseling provided to the patient          JARED Ashby

## 2021-05-02 NOTE — NURSING NOTE
Napping most of the evening, slept through wrap up group, compliant with routine medications, had a snack before bed, behavior controlled will continue to monitor

## 2021-05-02 NOTE — PROGRESS NOTES
05/01/21 1300   Activity/Group Checklist   Group Other (Comment)  (OPEN STUDIO/Art Therapy, Social Interaction)   Attendance Attended   Attendance Duration (min) 31-45   Interactions Interacted appropriately   Affect/Mood Appropriate   Goals Achieved Able to listen to others; Able to engage in interactions

## 2021-05-02 NOTE — NURSING NOTE
Requesting, "Haldol, because I' about ready to go off "  Offered atarax for anxiety, refused stating, "That will make me sleep! The haldol won't make me tired "  Agreed to take zyprexa 10mg for  Moderate agitation, explained that the haldol is ordered specifically for agitation that is non-responsive to zyprexa

## 2021-05-02 NOTE — PLAN OF CARE
Problem: Alteration in Thoughts and Perception  Goal: Verbalize thoughts and feelings  Description: Interventions:  - Promote a nonjudgmental and trusting relationship with the patient through active listening and therapeutic communication  - Assess patient's level of functioning, behavior and potential for risk  - Engage patient in 1 on 1 interactions  - Encourage patient to express fears, feelings, frustrations, and discuss symptoms    - [place] patient to reality, help patient recognize reality-based thinking   - Administer medications as ordered and assess for potential side effects  - Provide the patient education related to the signs and symptoms of the illness and desired effects of prescribed medications  Outcome: Progressing  Goal: Refrain from acting on delusional thinking/internal stimuli  Description: Interventions:  - Monitor patient closely, per order   - Utilize least restrictive measures   - Set reasonable limits, give positive feedback for acceptable   - Administer medications as ordered and monitor of potential side effects  Outcome: Progressing  Goal: Agree to be compliant with medication regime, as prescribed and report medication side effects  Description: Interventions:  - Offer appropriate PRN medication and supervise ingestion; conduct AIMS, as needed   Outcome: Progressing  Goal: Attend and participate in unit activities, including therapeutic, recreational, and educational groups  Description: Interventions:  -Encourage Visitation and family involvement in care  Outcome: Progressing     Problem: Ineffective Coping  Goal: Demonstrates healthy coping skills  Outcome: Progressing  Goal: Understands least restrictive measures  Description: Interventions:  - Utilize least restrictive behavior  Outcome: Progressing     Problem: Risk for Self Injury/Neglect  Goal: Verbalize thoughts and feelings  Description: Interventions:  - Assess and re-assess patient's lethality and potential for self-injury  - Engage patient in 1:1 interactions, daily, for a minimum of 15 minutes  - Encourage patient to express feelings, fears, frustrations, hopes  - Establish rapport/trust with patient   Outcome: Progressing  Goal: Refrain from harming self  Description: Interventions:  - Monitor patient closely, per order  - Develop a trusting relationship  - Supervise medication ingestion, monitor effects and side effects   Outcome: Progressing  Goal: Attend and participate in unit activities, including therapeutic, recreational, and educational groups  Description: Interventions:  - Provide therapeutic and educational activities daily, encourage attendance and participation, and document same in the medical record  - Obtain collateral information, encourage visitation and family involvement in care   Outcome: Progressing     Problem: Depression  Goal: Verbalize thoughts and feelings  Description: Interventions:  - Assess and re-assess patient's level of risk   - Engage patient in 1:1 interactions, daily, for a minimum of 15 minutes   - Encourage patient to express feelings, fears, frustrations, hopes   Outcome: Progressing  Goal: Refrain from harming self  Description: Interventions:  - Monitor patient closely, per order   - Supervise medication ingestion, monitor effects and side effects   Outcome: Progressing  Goal: Attend and participate in unit activities, including therapeutic, recreational, and educational groups  Description: Interventions:  - Provide therapeutic and educational activities daily, encourage attendance and participation, and document same in the medical record   Outcome: Progressing     Problem: Depression  Goal: Verbalize thoughts and feelings  Description: Interventions:  - Assess and re-assess patient's level of risk   - Engage patient in 1:1 interactions, daily, for a minimum of 15 minutes   - Encourage patient to express feelings, fears, frustrations, hopes   Outcome: Progressing  Goal: Refrain from harming self  Description: Interventions:  - Monitor patient closely, per order   - Supervise medication ingestion, monitor effects and side effects   Outcome: Progressing  Goal: Attend and participate in unit activities, including therapeutic, recreational, and educational groups  Description: Interventions:  - Provide therapeutic and educational activities daily, encourage attendance and participation, and document same in the medical record   Outcome: Progressing     Problem: Anxiety  Goal: Anxiety is at manageable level  Description: Interventions:  - Assess and monitor patient's anxiety level  - Monitor for signs and symptoms (heart palpitations, chest pain, shortness of breath, headaches, nausea, feeling jumpy, restlessness, irritable, apprehensive)  - Collaborate with interdisciplinary team and initiate plan and interventions as ordered    - [place] patient to unit/surroundings  - Explain treatment plan  - Encourage participation in care  - Encourage verbalization of concerns/fears  - Identify coping mechanisms  - Assist in developing anxiety-reducing skills  - Administer/offer alternative therapies  - Limit or eliminate stimulants  Outcome: Progressing     Problem: Risk for Violence/Aggression Toward Others  Goal: Control angry outbursts  Description: Interventions:  - Monitor patient closely, per order  - Ensure early verbal de-escalation  - Monitor prn medication needs  - Set reasonable/therapeutic limits, outline behavioral expectations, and consequences   - Provide a non-threatening milieu, utilizing the least restrictive interventions   Outcome: Progressing  Goal: Attend and participate in unit activities, including therapeutic, recreational, and educational groups  Description: Interventions:  - Provide therapeutic and educational activities daily, encourage attendance and participation, and document same in the medical record   Outcome: Progressing     Problem: Alteration in Orientation  Goal: Interact with staff daily  Description: Interventions:  - Assess and re-assess patient's level of orientation  - Engage patient in 1 on 1 interactions, daily, for a minimum of 15 minutes   - Establish rapport/trust with patient   Outcome: Progressing

## 2021-05-02 NOTE — NURSING NOTE
In bed eyes closed appears to be sleeping, body shifting and breath sounds noted, will continue to monitor

## 2021-05-02 NOTE — NURSING NOTE
Patient is quiet and cooperative today  She is visible on the unit but mostly isolative to self  She is noted to be on the phone a lot making calls to friends and family  She did have PRN Tums and tylenol today  Both were effective  No outbursts of yelling noted  No issues or concerns noted at this time    Continue to monitor

## 2021-05-03 LAB
ALBUMIN SERPL BCP-MCNC: 4.2 G/DL (ref 3–5.2)
ALP SERPL-CCNC: 56 U/L (ref 43–122)
ALT SERPL W P-5'-P-CCNC: 18 U/L
ANION GAP SERPL CALCULATED.3IONS-SCNC: 7 MMOL/L (ref 5–14)
AST SERPL W P-5'-P-CCNC: 25 U/L (ref 14–36)
BILIRUB SERPL-MCNC: 0.25 MG/DL
BUN SERPL-MCNC: 14 MG/DL (ref 5–25)
CALCIUM SERPL-MCNC: 9.8 MG/DL (ref 8.4–10.2)
CHLORIDE SERPL-SCNC: 102 MMOL/L (ref 97–108)
CO2 SERPL-SCNC: 30 MMOL/L (ref 22–30)
CREAT SERPL-MCNC: 0.61 MG/DL (ref 0.6–1.2)
GFR SERPL CREATININE-BSD FRML MDRD: 107 ML/MIN/1.73SQ M
GLUCOSE P FAST SERPL-MCNC: 94 MG/DL (ref 70–99)
GLUCOSE SERPL-MCNC: 94 MG/DL (ref 70–99)
POTASSIUM SERPL-SCNC: 4.4 MMOL/L (ref 3.6–5)
PROT SERPL-MCNC: 7.2 G/DL (ref 5.9–8.4)
SODIUM SERPL-SCNC: 139 MMOL/L (ref 137–147)

## 2021-05-03 PROCEDURE — 80053 COMPREHEN METABOLIC PANEL: CPT | Performed by: REGISTERED NURSE

## 2021-05-03 PROCEDURE — 99232 SBSQ HOSP IP/OBS MODERATE 35: CPT | Performed by: PSYCHIATRY & NEUROLOGY

## 2021-05-03 RX ORDER — HALOPERIDOL 1 MG/1
2 TABLET ORAL 3 TIMES DAILY
Status: DISCONTINUED | OUTPATIENT
Start: 2021-05-03 | End: 2021-05-06

## 2021-05-03 RX ADMIN — OLANZAPINE 10 MG: 10 TABLET, FILM COATED ORAL at 21:16

## 2021-05-03 RX ADMIN — LORAZEPAM 0.5 MG: 0.5 TABLET ORAL at 09:05

## 2021-05-03 RX ADMIN — HALOPERIDOL 2 MG: 1 TABLET ORAL at 09:05

## 2021-05-03 RX ADMIN — HALOPERIDOL 2 MG: 1 TABLET ORAL at 21:16

## 2021-05-03 RX ADMIN — LORAZEPAM 0.5 MG: 0.5 TABLET ORAL at 21:16

## 2021-05-03 RX ADMIN — PHENYTOIN 1 MG: 125 SUSPENSION ORAL at 21:16

## 2021-05-03 RX ADMIN — LIDOCAINE 1 PATCH: 50 PATCH CUTANEOUS at 09:05

## 2021-05-03 RX ADMIN — ESCITALOPRAM OXALATE 20 MG: 10 TABLET ORAL at 09:05

## 2021-05-03 RX ADMIN — BENZTROPINE MESYLATE 1 MG: 1 TABLET ORAL at 17:15

## 2021-05-03 RX ADMIN — GABAPENTIN 600 MG: 300 CAPSULE ORAL at 12:28

## 2021-05-03 RX ADMIN — FENOFIBRATE 145 MG: 145 TABLET ORAL at 09:05

## 2021-05-03 RX ADMIN — PSYLLIUM HUSK 1 PACKET: 3.4 POWDER ORAL at 09:05

## 2021-05-03 RX ADMIN — BENZTROPINE MESYLATE 1 MG: 1 TABLET ORAL at 09:05

## 2021-05-03 RX ADMIN — HALOPERIDOL 2 MG: 1 TABLET ORAL at 17:00

## 2021-05-03 RX ADMIN — GABAPENTIN 600 MG: 300 CAPSULE ORAL at 17:14

## 2021-05-03 RX ADMIN — NICOTINE 1 PATCH: 21 PATCH, EXTENDED RELEASE TRANSDERMAL at 09:06

## 2021-05-03 RX ADMIN — GABAPENTIN 600 MG: 300 CAPSULE ORAL at 21:16

## 2021-05-03 RX ADMIN — GABAPENTIN 600 MG: 300 CAPSULE ORAL at 09:05

## 2021-05-03 RX ADMIN — DIPHENHYDRAMINE HCL 25 MG: 25 TABLET ORAL at 21:16

## 2021-05-03 RX ADMIN — LORAZEPAM 0.5 MG: 0.5 TABLET ORAL at 17:14

## 2021-05-03 RX ADMIN — OLANZAPINE 15 MG: 5 TABLET, FILM COATED ORAL at 09:05

## 2021-05-03 RX ADMIN — LORAZEPAM 0.5 MG: 0.5 TABLET ORAL at 12:28

## 2021-05-03 NOTE — NURSING NOTE
Pt is agitated this morning, shouting throughout the morning  Pt questioned as to what is wrong, she reports "nothing, leave me alone"  Pt is seen in the hallways walking around at times, does not appear to socialize  Pt is medication compliant

## 2021-05-03 NOTE — NURSING NOTE
[place] has been visible, pleasant, and cooperative  No yelling noted this shift  Patient was isolative to self  Denies all psych symptoms  She is medication and meal compliant  Consumed 100% of dinner  Patient attended wrap up group this shift  Continue to monitor

## 2021-05-03 NOTE — PROGRESS NOTES
05/03/21 0941   Team Meeting   Meeting Type Daily Rounds   Initial Conference Date 05/03/21   Patient/Family Present   Patient Present No   Patient's Family Present No       Daily Rounds Documentation  Team Members Participating  Dr Gage Hernández MD  [facility] Alejandro, RN  [place] [id number], [id number] [facility]  Will Emery RN     Case reviewed  Labile, easily agitated, still requesting for PRNS and having periods of yelling aloud in her room  Behaviors otherwise controlled over weekend  No aggression towards others  Medication compliant

## 2021-05-03 NOTE — PROGRESS NOTES
05/03/21 1100   Activity/Group Checklist   Group   (Recovery Management )   Attendance Attended   Attendance Duration (min) 46-60   Interactions Interacted appropriately   Affect/Mood Appropriate;Calm;Normal range   Goals Achieved Identified feelings; Able to engage in interactions; Able to listen to others

## 2021-05-03 NOTE — PROGRESS NOTES
05/03/21 0900   Activity/Group Checklist   Group Community meeting   Attendance Attended   Attendance Duration (min) 31-45   Interactions Interacted appropriately   Affect/Mood Appropriate;Normal range;Calm   Goals Achieved Able to listen to others; Able to engage in interactions

## 2021-05-03 NOTE — PROGRESS NOTES
Psychiatry Progress Note [facility] 53 y o  female MRN: [id number]  Unit/Bed#: [facility]BER L[facility] 109-01 Encounter: [id number]  Code Status: Level 1 - Full Code    PCP: Mic Mchugh DO    Date of Admission:  4/7/2021 1435   Date of Service:  05/03/21    Patient Active Problem List   Diagnosis    Schizoaffective disorder, bipolar type ([facility] )    Uncomplicated alcohol dependence ([facility] )    Suicidal behavior    LYNN (generalized anxiety disorder)    Slow transit constipation    Deficiency of vitamin B    Degenerative disc disease, lumbar    Post-traumatic stress disorder, chronic    Lower extremity weakness    Generalized abdominal pain    Non-intractable vomiting    Medical clearance for psychiatric admission    Carbuncle    Alcohol dependence with unspecified alcohol-induced disorder ([facility] )    Mild intermittent asthma without complication    Tobacco abuse    Ear problem, bilateral       Review of systems:     Unremarkable but had complained of headache and  Nausea and vomiting over weekend  which is now resolved  Diagnosis:       Schizoaffective bipolar, alcohol use disorder episodic in early remission in controlled environment  ,Assessment   Overall Status:       Homeless yelling and still needs p r n  medications off and on but overall improving   Certification Statement: The patient will continue to require additional inpatient hospital stay due to ongoing mood swings paranoia   Acceptance by patient:  accepting    Hopefulness in recovery:  Living in a group home   Understanding of medications: has good understanding   Involved in reintegration process:  Adjusting to the unit   Trusting in relationship with psychiatrist:  trusting   Justification for dual anti-psychotics:  Not applicable   Side effects from treatment: none    Medication changes    increase p o   Haldol 2 mg 3 times a day which has been effective  For the voices  Non-pharmacological treatments   Continue with individual, group, milieu and occupational therapy using recovery principles and psycho-education about accepting illness and the need for treatment  Safety   Safety and communication plan established to target dynamic risk factors discussed above  Discharge Plan     most likely to a group home with the act team    Interval Progress    patient reports the psychotic episodes her decreased but continues to need p r n  medications   A few times  Daily  She still yells and screams but less often and is attending groups and is usually friendly pleasant when approached and affect is brighter  Still somewhat delusional about staff doing hypnosis on her  Does attend most of the groups and not aggressive  Claims to hear voices of her ex-boyfriend coming through the vent sometimes  Patient was found screaming at the top of her voice several times in the morning she claims the voices were upsetting her she agreed to increase the p o  Haldol as 3 times a day to see if that will help     Sleep:        Good   appetite:    good   compliance with medications all:   good   Side  Effects:           none   significant events:      Occasional yelling and response to voices and paranoia but more redirectable   group attendance:   Attending most of the groups    Mental Status Exam  Appearance: age appropriate, casually dressed, adequate grooming, looks older than stated age, underweight    Friendly pleasant but agitated at times   Behavior: normal, pleasant, cooperative, appears anxious  Speech: fluent, coherent, hypertalkative,   Circumstantial pressured   Redirectable  Mood: depressed, anxious, labile, euphoric  Affect: inappropriate, labile, reactive, slightly brighter  Thought Process: tends to be pressured and circumstantial and preoccupied perseverating on discharge    Thought Content: some paranoia, mild paranoia, ideas of reference, but does appear as if paranoid    No current suicidal homicidal thoughts intent or plans verbalized  No phobias obsessions compulsions or distorted body perceptions elicited  Still believes staff are  Hypnotizing her  Perceptual Disturbances: no auditory hallucinations, no visual hallucinations  Still hears voices and sees things move but not commanding  Hx Risk Factors: chronic mood disorder, chronic psychotic symptoms, alcohol use  Sensorium:      Oriented to 3 spheres and to situation  Cognition: recent and remote memory grossly intact  Consciousness: alert and awake  Attention: attention span and concentration are age appropriate  Intellect: appears to be of average intelligence  Insight: improving  Judgement: improving  Motor Activity: no abnormal movements     Vitals  Temp:  [97 2 °F (36 2 °C)-97 8 °F (36 6 °C)] 97 8 °F (36 6 °C)  HR:  [74-91] 91  Resp:  [16-20] 20  BP: (107-146)/(56-83) 107/56  No intake or output data in the 24 hours ending 05/03/21 5084    Lab Results:  [facility] Admission Reviewed     Current Facility-Administered Medications   Medication Dose Route Frequency Provider Last Rate    acetaminophen  650 mg Oral Q6H PRN Venancio Aloe Lodics, CRNP      acetaminophen  650 mg Oral Q4H PRN Venancio Aloe Lodics, CRNP      acetaminophen  975 mg Oral Q6H PRN Venancio Aloe Lodics, CRNP      al mag oxide-diphenhydramine-lidocaine viscous  10 mL Swish & Swallow Q4H PRN Hesham Pearson, CRNP      albuterol  2 puff Inhalation Q6H PRN Venancio Aloe Lodics, CRNP      benztropine  1 mg Intramuscular Q4H PRN Max 6/day Isis N Lodics, CRNP      benztropine  1 mg Oral Q4H PRN Max 6/day Isis N Lodics, CRNP      benztropine  1 mg Oral BID Ronni Stahl MD      calcium carbonate  500 mg Oral TID PRN Venancio Aloe Lodics, CRNP      diphenhydrAMINE  25 mg Oral HS Isis N Lodics, CRNP      escitalopram  20 mg Oral Daily Isis N Lodics, CRNP      fenofibrate  145 mg Oral Daily Isis N Germanics, CRNP      gabapentin  600 mg Oral 4x Daily Isis Porterics, CRNP      haloperidol  2 mg Oral BID Kristan Dance, CRNP      haloperidol  5 mg Oral Q6H PRN Venancio Aloe Lodics, CRNP      hydrOXYzine HCL  25 mg Oral Q6H PRN Max 4/day Isis Porterics, CRNP      hydrOXYzine HCL  50 mg Oral Q6H PRN Max 4/day Isis Porterics, CRNP      lidocaine  1 patch Topical Daily Isis Porterics, CRNP      LORazepam  1 mg Intramuscular Q6H PRN Shefali Stanley MD      LORazepam  0 5 mg Oral 4x Daily Shefali Stanley MD      magnesium hydroxide  30 mL Oral Daily PRN Venancio Aloe Lodics, CRNP      nicotine  1 patch Transdermal Daily Elsworth Pee, CRNP      OLANZapine  2 5 mg Oral Q8H PRN Elsworth Pee, CRNP      OLANZapine  5 mg Oral Q3H PRN Elsworth Pee, CRNP      OLANZapine  7 5 mg Oral Q3H PRN Elsworth Pee, CRNP      OLANZapine  10 mg Intramuscular Q3H PRN Elsworth Pee, CRNP      OLANZapine  5 mg Intramuscular Q3H PRN Elsworth Pee, CRNP      OLANZapine  10 mg Oral HS Luis Maldonado MD      OLANZapine  15 mg Oral Daily Luis Maldonado MD      Pramox-PE-Glycerin-Petrolatum  1 application Rectal 4x Daily PRN Isis Porterics, CRNP      prazosin  1 mg Oral HS Elsworth Pee, CRNP      psyllium  1 packet Oral Daily JARED Isbell         Counseling / Coordination of Care: Total floor / unit time spent today 15 minutes  Greater than 50% of total time was spent with the patient and / or family counseling and / or somewhat receptive to supportive listening and teaching positive coping skills to deal with symptom mangement  Patient's Rights, confidentiality and exceptions to confidentiality, use of automated medical record, [facility] access to medical record, and consent to treatment reviewed  This note was  not shared with the patient because it might further aggravate her psychiatric condition  This note has been dictated

## 2021-05-03 NOTE — NURSING NOTE
Martinez Hi has been awake, alert, and visible intermittently out in the milieu  No yelling or outbursts noted this shift  Pt stated that she feels better but still irritable at times  Pt given ginger ale for indigestion and requested prune juice for constipation and given  Pt vomited large amount of undigested food after started eating supper  Pt then returned to dining room and finished eating her supper  No further vomiting noted this shift  Ate 100% supper  Compliant with all scheduled meds when called but had to be awakened for bedtime meds  Had evening snack but did not attend evening groups  Continue to monitor/assess for any changes

## 2021-05-04 PROCEDURE — 99232 SBSQ HOSP IP/OBS MODERATE 35: CPT | Performed by: PSYCHIATRY & NEUROLOGY

## 2021-05-04 RX ORDER — MAGNESIUM HYDROXIDE/ALUMINUM HYDROXICE/SIMETHICONE 120; 1200; 1200 MG/30ML; MG/30ML; MG/30ML
15 SUSPENSION ORAL EVERY 4 HOURS PRN
Status: DISCONTINUED | OUTPATIENT
Start: 2021-05-04 | End: 2021-07-29 | Stop reason: HOSPADM

## 2021-05-04 RX ADMIN — HALOPERIDOL 5 MG: 5 TABLET ORAL at 14:17

## 2021-05-04 RX ADMIN — LORAZEPAM 0.5 MG: 0.5 TABLET ORAL at 17:00

## 2021-05-04 RX ADMIN — GABAPENTIN 600 MG: 300 CAPSULE ORAL at 17:00

## 2021-05-04 RX ADMIN — PHENYTOIN 1 MG: 125 SUSPENSION ORAL at 21:03

## 2021-05-04 RX ADMIN — BENZTROPINE MESYLATE 1 MG: 1 TABLET ORAL at 08:12

## 2021-05-04 RX ADMIN — LORAZEPAM 0.5 MG: 0.5 TABLET ORAL at 08:12

## 2021-05-04 RX ADMIN — PSYLLIUM HUSK 1 PACKET: 3.4 POWDER ORAL at 08:13

## 2021-05-04 RX ADMIN — OLANZAPINE 2.5 MG: 5 TABLET, ORALLY DISINTEGRATING ORAL at 03:03

## 2021-05-04 RX ADMIN — LORAZEPAM 0.5 MG: 0.5 TABLET ORAL at 21:02

## 2021-05-04 RX ADMIN — NICOTINE 1 PATCH: 21 PATCH, EXTENDED RELEASE TRANSDERMAL at 08:14

## 2021-05-04 RX ADMIN — HALOPERIDOL 2 MG: 1 TABLET ORAL at 21:03

## 2021-05-04 RX ADMIN — CALCIUM CARBONATE (ANTACID) CHEW TAB 500 MG 500 MG: 500 CHEW TAB at 14:18

## 2021-05-04 RX ADMIN — GABAPENTIN 600 MG: 300 CAPSULE ORAL at 21:03

## 2021-05-04 RX ADMIN — LIDOCAINE 1 PATCH: 50 PATCH CUTANEOUS at 08:13

## 2021-05-04 RX ADMIN — DIPHENHYDRAMINE HCL 25 MG: 25 TABLET ORAL at 21:03

## 2021-05-04 RX ADMIN — OLANZAPINE 10 MG: 10 TABLET, FILM COATED ORAL at 21:03

## 2021-05-04 RX ADMIN — FENOFIBRATE 145 MG: 145 TABLET ORAL at 08:12

## 2021-05-04 RX ADMIN — HYDROXYZINE HYDROCHLORIDE 25 MG: 25 TABLET, FILM COATED ORAL at 03:03

## 2021-05-04 RX ADMIN — LORAZEPAM 0.5 MG: 0.5 TABLET ORAL at 12:00

## 2021-05-04 RX ADMIN — HALOPERIDOL 2 MG: 1 TABLET ORAL at 17:00

## 2021-05-04 RX ADMIN — HALOPERIDOL 2 MG: 1 TABLET ORAL at 08:12

## 2021-05-04 RX ADMIN — OLANZAPINE 15 MG: 5 TABLET, FILM COATED ORAL at 08:12

## 2021-05-04 RX ADMIN — GABAPENTIN 600 MG: 300 CAPSULE ORAL at 08:12

## 2021-05-04 RX ADMIN — GABAPENTIN 600 MG: 300 CAPSULE ORAL at 12:00

## 2021-05-04 RX ADMIN — BENZTROPINE MESYLATE 1 MG: 1 TABLET ORAL at 17:01

## 2021-05-04 RX ADMIN — ESCITALOPRAM OXALATE 20 MG: 10 TABLET ORAL at 08:12

## 2021-05-04 NOTE — PROGRESS NOTES
05/04/21 1100   Activity/Group Checklist   Group Community meeting   Attendance Attended   Attendance Duration (min) 46-60   Interactions Interacted appropriately   Affect/Mood Calm;Constricted   Goals Achieved Discussed coping strategies; Able to listen to others; Able to engage in interactions; Able to reflect/comment on own behavior

## 2021-05-04 NOTE — NURSING NOTE
Out of bed in the acharya gait steady, loud, irritable, diruptive, demanding haldol for agitation, explained to patient that her routine haldol order was just increased yesterday form 2 times a day to 3 times a day and that her as needed haldol order can only be given if the zyprexa is ineffective    She stated, "The doctor told me to use the haldol when I need it and you guys haven't given it to me, not once!"  Agreed to take atarax 25mg for mild anxiety and Zyprexa 2 5mg for mild agitation and returned to bed

## 2021-05-04 NOTE — NURSING NOTE
Patient is quiet and cooperative today  She is visible on the unit but mostly isolative to self  She is irritable at times  She is noted to be on the phone a lot making calls to friends and family  She did have PRN Tums today  Minimal yelling noted   Continue to monitor

## 2021-05-04 NOTE — NURSING NOTE
Arlyn Schirmer has been visible  Patient has been irritable, labile, and attention seeking  Patient was yelling in dining room shortly after dinner and when I asked her why she stated "I am just having a bad day " Writer informed patient that she needs to use other methods of coping besides yelling  Patient than proceeded to make a phone call and was heard saying "Usually when I yell I get offered all these medications so that I can be on cloud nine but today there not doing that I guess it didn't work " Patient proceeded to go in her room and yell that staff was cutting her hair  Patient deescalated on her own  Medication and meal compliant  Consumed 75% of dinner  Patient attended wrap up group this shift  Continue to monitor

## 2021-05-04 NOTE — PLAN OF CARE
Problem: Alteration in Thoughts and Perception  Goal: Verbalize thoughts and feelings  Description: Interventions:  - Promote a nonjudgmental and trusting relationship with the patient through active listening and therapeutic communication  - Assess patient's level of functioning, behavior and potential for risk  - Engage patient in 1 on 1 interactions  - Encourage patient to express fears, feelings, frustrations, and discuss symptoms    - [place] patient to reality, help patient recognize reality-based thinking   - Administer medications as ordered and assess for potential side effects  - Provide the patient education related to the signs and symptoms of the illness and desired effects of prescribed medications  Outcome: Not Progressing  Goal: Refrain from acting on delusional thinking/internal stimuli  Description: Interventions:  - Monitor patient closely, per order   - Utilize least restrictive measures   - Set reasonable limits, give positive feedback for acceptable   - Administer medications as ordered and monitor of potential side effects  Outcome: Not Progressing  Goal: Agree to be compliant with medication regime, as prescribed and report medication side effects  Description: Interventions:  - Offer appropriate PRN medication and supervise ingestion; conduct AIMS, as needed   Outcome: Progressing  Goal: Attend and participate in unit activities, including therapeutic, recreational, and educational groups  Description: Interventions:  -Encourage Visitation and family involvement in care  Outcome: Progressing

## 2021-05-04 NOTE — PROGRESS NOTES
Psychiatry Progress Note [facility] 53 y o  female MRN: [id number]  Unit/Bed#: AMBER LOU [facility] 109-01 Encounter: [id number]  Code Status: Level 1 - Full Code    PCP: Slim Melara DO    Date of Admission:  4/7/2021 1435   Date of Service:  05/04/21    Patient Active Problem List   Diagnosis    Schizoaffective disorder, bipolar type ([facility] )    Uncomplicated alcohol dependence ([facility] )    Suicidal behavior    LYNN (generalized anxiety disorder)    Slow transit constipation    Deficiency of vitamin B    Degenerative disc disease, lumbar    Post-traumatic stress disorder, chronic    Lower extremity weakness    Generalized abdominal pain    Non-intractable vomiting    Medical clearance for psychiatric admission    Carbuncle    Alcohol dependence with unspecified alcohol-induced disorder ([facility] )    Mild intermittent asthma without complication    Tobacco abuse    Ear problem, bilateral     Review of systems:      unremarkable  Diagnosis:       Schizoaffective bipolar, alcohol use disorder episodic in early remission in controlled environment  ,Assessment   Overall Status:       Repeatedly yelling screaming in the morning and seeking p r n  medications for "psychotic Episodes"   Certification Statement: The patient will continue to require additional inpatient hospital stay due to ongoing mood swings paranoia   Acceptance by patient:  accepting    Hopefulness in recovery:  Living in a group home   Understanding of medications: has good understanding   Involved in reintegration process:  Adjusting to the unit   Trusting in relationship with psychiatrist:  trusting   Justification for dual anti-psychotics:  Not applicable   Side effects from treatment: none    Medication changes    haldol increased yesterday   Non-pharmacological treatments   Continue with individual, group, milieu and occupational therapy using recovery principles and psycho-education about accepting illness and the need for treatment  Safety   Safety and communication plan established to target dynamic risk factors discussed above  Discharge Plan     most likely to a group home with the act team    Interval Progress    patient was screaming yelling loudly disturbing other peers on the unit in the morning and finally come down after she took her morning medications  Her Haldol was increased with some benefit but she was still demanding more frequent p r n  Haldol but she did get p r n  Zyprexa instead  Continues to yell and scream but can be friendly and pleasant when approached and affect is definitely brighter  Continues to be delusional about staff not liking and doing hypnosis on her and claims to still hear voices which are not commanding  She is attending some of the groups    Sleep:       good   appetite:     good   compliance with medications :  good   Side  Effects:            None reported today    significant events:       Still yells and screams in response to voices and is still paranoid seeking p r n  medications often   group attendance:   Attending most of the groups    Mental Status Exam  Appearance: age appropriate, casually dressed, adequate grooming, looks older than stated age, underweight     Behavior: normal, pleasant, cooperative, appears anxious  Speech: fluent, coherent, hypertalkative,   Circumstantial pressured   redirectable  Mood: depressed, anxious, labile, euphoric  Affect: inappropriate, labile, reactive, slightly brighter  Thought Process: tends to be pressured and circumstantial and preoccupied perseverating on discharge    Thought Content: some paranoia, mild paranoia, ideas of reference, but does appear as if paranoid  No current suicidal homicidal thoughts intent or plans verbalized  No phobias obsessions compulsions or distorted body perceptions elicited     Continues to claim staff are doing hypnosis on her  Perceptual Disturbances: no auditory hallucinations, no visual hallucinations  Still claims to hear voices off and on and see things, not commanding   Hx Risk Factors: chronic mood disorder, chronic psychotic symptoms, alcohol use  Sensorium:      Oriented to 3 spheres and to situation  Cognition: recent and remote memory grossly intact  Consciousness: alert and awake  Attention: attention span and concentration are age appropriate  Intellect: appears to be of average intelligence  Insight: improving  Judgement: improving  Motor Activity: no abnormal movements     Vitals  Temp:  [97 7 °F (36 5 °C)-98 3 °F (36 8 °C)] 97 7 °F (36 5 °C)  HR:  [76-87] 87  Resp:  [15-20] 15  BP: (105-115)/(55-68) 115/66  No intake or output data in the 24 hours ending 05/04/21 0552    Lab Results:  [facility] Admission Reviewed     Current Facility-Administered Medications   Medication Dose Route Frequency Provider Last Rate    acetaminophen  650 mg Oral Q6H PRN Clay Shames Lodics, CRNP      acetaminophen  650 mg Oral Q4H PRN Clay Shamsusan Porterics, CRNP      acetaminophen  975 mg Oral Q6H PRN Clay Porterics, CRNP      al mag oxide-diphenhydramine-lidocaine viscous  10 mL Swish & Swallow Q4H PRN Sonido Estrada, CRNP      albuterol  2 puff Inhalation Q6H PRN Clay Lama, CRNP      benztropine  1 mg Intramuscular Q4H PRN Max 6/day Isis Lama, CRNP      benztropine  1 mg Oral Q4H PRN Max 6/day Isis Lama, CRNP      benztropine  1 mg Oral BID Lisset Madison MD      calcium carbonate  500 mg Oral TID PRN Clay Porterics, CRNP      diphenhydrAMINE  25 mg Oral HS Isis Porterics, CRNP      escitalopram  20 mg Oral Daily Isis Porterics, CRNP      fenofibrate  145 mg Oral Daily Isis Lama, CRNP      gabapentin  600 mg Oral 4x Daily Isis Lama, CRNP      haloperidol  2 mg Oral TID Jenna Lind MD      haloperidol  5 mg Oral Q6H PRN Isis Porterics, CRNP      hydrOXYzine HCL  25 mg Oral Q6H PRN Max 4/day Isis Lama, CRNP      hydrOXYzine HCL  50 mg Oral Q6H PRN Max 4/day Isis Lama, CRNP      lidocaine  1 patch Topical Daily Isis Lama, JARED      LORazepam  1 mg Intramuscular Q6H PRN Troy Simpson MD      LORazepam  0 5 mg Oral 4x Daily Troy Simpson MD      magnesium hydroxide  30 mL Oral Daily PRN Ricardo Mariner Lodics, CRNP      nicotine  1 patch Transdermal Daily [facility], CRNP      OLANZapine  2 5 mg Oral Q8H PRN [facility], CRNP      OLANZapine  5 mg Oral Q3H PRN [facility], CRNP      OLANZapine  7 5 mg Oral Q3H PRN [facility], CRNP      OLANZapine  10 mg Intramuscular Q3H PRN [facility], CRNP      OLANZapine  5 mg Intramuscular Q3H PRN [facility], CRNP      OLANZapine  10 mg Oral HS Sherren Hidalgo, MD      OLANZapine  15 mg Oral Daily Sherren Hidalgo, MD      Pramox-PE-Glycerin-Petrolatum  1 application Rectal 4x Daily PRN Isis Lama, JARED      prazosin  1 mg Oral HS [place], JARED      psyllium  1 packet Oral Daily JARED Isbell         Counseling / Coordination of Care: Total floor / unit time spent today 15 minutes  Greater than 50% of total time was spent with the patient and / or family counseling and / or somewhat receptive to supportive listening and teaching positive coping skills to deal with symptom mangement  Patient's Rights, confidentiality and exceptions to confidentiality, use of automated medical record, [facility] staff access to medical record, and consent to treatment reviewed  This note was  not shared with the patient because it might further aggravate her psychiatric condition  This note has been dictated

## 2021-05-05 PROCEDURE — 99232 SBSQ HOSP IP/OBS MODERATE 35: CPT | Performed by: PSYCHIATRY & NEUROLOGY

## 2021-05-05 RX ADMIN — DIPHENHYDRAMINE HCL 25 MG: 25 TABLET ORAL at 21:00

## 2021-05-05 RX ADMIN — GABAPENTIN 600 MG: 300 CAPSULE ORAL at 17:14

## 2021-05-05 RX ADMIN — NICOTINE 1 PATCH: 21 PATCH, EXTENDED RELEASE TRANSDERMAL at 09:00

## 2021-05-05 RX ADMIN — OLANZAPINE 15 MG: 5 TABLET, FILM COATED ORAL at 08:28

## 2021-05-05 RX ADMIN — LORAZEPAM 0.5 MG: 0.5 TABLET ORAL at 17:15

## 2021-05-05 RX ADMIN — LORAZEPAM 0.5 MG: 0.5 TABLET ORAL at 12:16

## 2021-05-05 RX ADMIN — LORAZEPAM 0.5 MG: 0.5 TABLET ORAL at 21:00

## 2021-05-05 RX ADMIN — BENZTROPINE MESYLATE 1 MG: 1 TABLET ORAL at 08:28

## 2021-05-05 RX ADMIN — PSYLLIUM HUSK 1 PACKET: 3.4 POWDER ORAL at 08:28

## 2021-05-05 RX ADMIN — FENOFIBRATE 145 MG: 145 TABLET ORAL at 08:28

## 2021-05-05 RX ADMIN — OLANZAPINE 10 MG: 10 TABLET, FILM COATED ORAL at 21:00

## 2021-05-05 RX ADMIN — HALOPERIDOL 2 MG: 1 TABLET ORAL at 21:01

## 2021-05-05 RX ADMIN — GABAPENTIN 600 MG: 300 CAPSULE ORAL at 08:28

## 2021-05-05 RX ADMIN — MAGNESIUM HYDROXIDE 30 ML: 400 SUSPENSION ORAL at 15:03

## 2021-05-05 RX ADMIN — PHENYTOIN 1 MG: 125 SUSPENSION ORAL at 21:00

## 2021-05-05 RX ADMIN — ESCITALOPRAM OXALATE 20 MG: 10 TABLET ORAL at 08:28

## 2021-05-05 RX ADMIN — LORAZEPAM 0.5 MG: 0.5 TABLET ORAL at 08:28

## 2021-05-05 RX ADMIN — HALOPERIDOL 2 MG: 1 TABLET ORAL at 16:57

## 2021-05-05 RX ADMIN — BENZTROPINE MESYLATE 1 MG: 1 TABLET ORAL at 17:15

## 2021-05-05 RX ADMIN — GABAPENTIN 600 MG: 300 CAPSULE ORAL at 21:01

## 2021-05-05 RX ADMIN — GABAPENTIN 600 MG: 300 CAPSULE ORAL at 12:16

## 2021-05-05 RX ADMIN — HALOPERIDOL 2 MG: 1 TABLET ORAL at 08:28

## 2021-05-05 RX ADMIN — LIDOCAINE 1 PATCH: 50 PATCH CUTANEOUS at 08:28

## 2021-05-05 NOTE — PLAN OF CARE
Attn RN / Psychiatrist - last Invega IM received by patient was on 2/23/21 234 mg  Due every 90 days (3 months, or quarterly)

## 2021-05-05 NOTE — PROGRESS NOTES
05/05/21 0700   Activity/Group Checklist   Group   ( Coffee Talk )   Attendance Attended   Attendance Duration (min) 46-60   Interactions Interacted appropriately   Affect/Mood Appropriate;Bright;Normal range;Calm   Goals Achieved Identified feelings; Able to listen to others; Able to engage in interactions

## 2021-05-05 NOTE — PROGRESS NOTES
05/05/21 0804   Team Meeting   Meeting Type Tx Team Meeting   Initial Conference Date 05/04/21   Next Conference Date 05/11/21   Team Members Present   Team Members Present Physician;; Other (Discipline and Name); Nurse   Physician Team Member Dr Sara Diaz MD   Nursing Team Member Jody Ordoñez RN   Social Work Team Member Benedetto Bloch LSW   Other (Discipline and Name) [facility]   Patient/Family Present   Patient Present Yes   Patient's Family Present No       Summary  Patient presented for her treatment team this morning without a completed self assessment  She stated she had misplaced it, but did complete it  Pt is controlled at this time, appears somewhat tense and anxious, and has had recent periods of yelling and screaming on the unit she often refers to as "psychosis " Pt has continued to take PRNS on a regular basis to help manage her symptoms and at times reports she isn't able to get a PRN but isn't sure why  Team discussed the reasons for this which patient was receptive to  Discussed medication changes and things pt is working on in therapy  Pt then asked about when she will be leaving the hospital and about group homes  Yasir Members addressed this adequately, stating that she wants to be clear on the level of care the treatment team is recommending, and at this time can see patient being appropriate for several different group homes in her area, one of them being [address] [place] [address]  Discussed patient's yelling and screaming as a possible barrier to discharge, encouraging her to continue learning other ways of releasing her energy and coping  Pt receptive to the entire conversation and then reviewed, signed her treatment plan  Meeting ended mutually

## 2021-05-05 NOTE — PROGRESS NOTES
05/05/21 0900   Activity/Group Checklist   Group Community meeting   Attendance Did not attend  (left early did not return )   Attendance Duration (min) 16-30   Interactions Interacted appropriately   Affect/Mood Appropriate;Calm;Normal range   Goals Achieved Identified feelings; Able to listen to others; Able to engage in interactions

## 2021-05-05 NOTE — NURSING NOTE
Mallika Hopkins has been visible  Patient has been irritable, labile, and attention seeking during the shift  She has been walking the unit stating " ew that's disgusting" and cursing  When patient is approached she does not elaborate and just walks away  Mallika Hopkins can be manipulative at times and has attempted to staff split  Patient encouraged to use her coping skills  Consumed 100% of dinner  Attended wrap up group  Continue to monitor

## 2021-05-05 NOTE — NURSING NOTE
Pt is calm and cooperative  She is attending groups  No yelling or outburst noted  Pt denies psych symptoms  She appears to be medication focused asking whether her haldol had been increased to 4x a day  Pt is currently receiving haldol 3x a day  Pt is restless wandering the unit with minimal interaction with peers

## 2021-05-05 NOTE — PLAN OF CARE
Problem: Nutrition/Hydration-ADULT  Goal: Nutrient/Hydration intake appropriate for improving, restoring or maintaining nutritional needs  Description: Monitor and assess patient's nutrition/hydration status for malnutrition  Collaborate with interdisciplinary team and initiate plan and interventions as ordered  Monitor patient's weight and dietary intake as ordered or per policy  Utilize nutrition screening tool and intervene as necessary  Determine patient's food preferences and provide high-protein, high-caloric foods as appropriate       INTERVENTIONS:  - Monitor oral intake, urinary output, labs, and treatment plans  - Assess nutrition and hydration status and recommend course of action  - Evaluate amount of meals eaten  - Assist patient with eating if necessary   - Allow adequate time for meals  - Recommend/ encourage appropriate diets, oral nutritional supplements, and vitamin/mineral supplements  - Order, calculate, and assess calorie counts as needed  - Recommend, monitor, and adjust tube feedings and TPN/PPN based on assessed needs  - Assess need for intravenous fluids  - Provide specific nutrition/hydration education as appropriate  - Include patient/family/caregiver in decisions related to nutrition  Outcome: Progressing     Problem: Alteration in Thoughts and Perception  Goal: Treatment Goal: Gain control of psychotic behaviors/thinking, reduce/eliminate presenting symptoms and demonstrate improved reality functioning upon discharge  Outcome: Progressing  Goal: Verbalize thoughts and feelings  Description: Interventions:  - Promote a nonjudgmental and trusting relationship with the patient through active listening and therapeutic communication  - Assess patient's level of functioning, behavior and potential for risk  - Engage patient in 1 on 1 interactions  - Encourage patient to express fears, feelings, frustrations, and discuss symptoms    - [place] patient to reality, help patient recognize reality-based thinking   - Administer medications as ordered and assess for potential side effects  - Provide the patient education related to the signs and symptoms of the illness and desired effects of prescribed medications  Outcome: Progressing  Goal: Refrain from acting on delusional thinking/internal stimuli  Description: Interventions:  - Monitor patient closely, per order   - Utilize least restrictive measures   - Set reasonable limits, give positive feedback for acceptable   - Administer medications as ordered and monitor of potential side effects  Outcome: Progressing  Goal: Agree to be compliant with medication regime, as prescribed and report medication side effects  Description: Interventions:  - Offer appropriate PRN medication and supervise ingestion; conduct AIMS, as needed   Outcome: Progressing  Goal: Attend and participate in unit activities, including therapeutic, recreational, and educational groups  Description: Interventions:  -Encourage Visitation and family involvement in care  Outcome: Progressing  Goal: Recognize dysfunctional thoughts, communicate reality-based thoughts at the time of discharge  Description: Interventions:  - Provide medication and psycho-education to assist patient in compliance and developing insight into his/her illness   Outcome: Progressing  Goal: Complete daily ADLs, including personal hygiene independently, as able  Description: Interventions:  - Observe, teach, and assist patient with ADLS  - Monitor and promote a balance of rest/activity, with adequate nutrition and elimination   Outcome: Progressing     Problem: Ineffective Coping  Goal: Cooperates with admission process  Description: Interventions:   - Complete admission process  Outcome: Progressing  Goal: Demonstrates healthy coping skills  Outcome: Progressing  Goal: Participates in unit activities  Description: Interventions:  - Provide therapeutic environment   - Provide required programming   - Redirect inappropriate behaviors   Outcome: Progressing  Goal: Patient/Family participate in treatment and DC plans  Description: Interventions:  - Provide therapeutic environment  Outcome: Progressing  Goal: Patient/Family verbalizes awareness of resources  Outcome: Progressing  Goal: Understands least restrictive measures  Description: Interventions:  - Utilize least restrictive behavior  Outcome: Progressing  Goal: Free from restraint events  Description: - Utilize least restrictive measures   - Provide behavioral interventions   - Redirect inappropriate behaviors   Outcome: Progressing     Problem: Risk for Self Injury/Neglect  Goal: Treatment Goal: Remain safe during length of stay, learn and adopt new coping skills, and be free of self-injurious ideation, impulses and acts at the time of discharge  Outcome: Progressing  Goal: Verbalize thoughts and feelings  Description: Interventions:  - Assess and re-assess patient's lethality and potential for self-injury  - Engage patient in 1:1 interactions, daily, for a minimum of 15 minutes  - Encourage patient to express feelings, fears, frustrations, hopes  - Establish rapport/trust with patient   Outcome: Progressing  Goal: Refrain from harming self  Description: Interventions:  - Monitor patient closely, per order  - Develop a trusting relationship  - Supervise medication ingestion, monitor effects and side effects   Outcome: Progressing  Goal: Attend and participate in unit activities, including therapeutic, recreational, and educational groups  Description: Interventions:  - Provide therapeutic and educational activities daily, encourage attendance and participation, and document same in the medical record  - Obtain collateral information, encourage visitation and family involvement in care   Outcome: Progressing  Goal: Recognize maladaptive responses and adopt new coping mechanisms  Outcome: Progressing  Goal: Complete daily ADLs, including personal hygiene independently, as able  Description: Interventions:  - Observe, teach, and assist patient with ADLS  - Monitor and promote a balance of rest/activity, with adequate nutrition and elimination  Outcome: Progressing     Problem: Depression  Goal: Treatment Goal: Demonstrate behavioral control of depressive symptoms, verbalize feelings of improved mood/affect, and adopt new coping skills prior to discharge  Outcome: Progressing  Goal: Verbalize thoughts and feelings  Description: Interventions:  - Assess and re-assess patient's level of risk   - Engage patient in 1:1 interactions, daily, for a minimum of 15 minutes   - Encourage patient to express feelings, fears, frustrations, hopes   Outcome: Progressing  Goal: Refrain from harming self  Description: Interventions:  - Monitor patient closely, per order   - Supervise medication ingestion, monitor effects and side effects   Outcome: Progressing  Goal: Refrain from isolation  Description: Interventions:  - Develop a trusting relationship   - Encourage socialization   Outcome: Progressing  Goal: Refrain from self-neglect  Outcome: Progressing  Goal: Attend and participate in unit activities, including therapeutic, recreational, and educational groups  Description: Interventions:  - Provide therapeutic and educational activities daily, encourage attendance and participation, and document same in the medical record   Outcome: Progressing  Goal: Complete daily ADLs, including personal hygiene independently, as able  Description: Interventions:  - Observe, teach, and assist patient with ADLS  -  Monitor and promote a balance of rest/activity, with adequate nutrition and elimination   Outcome: Progressing     Problem: Anxiety  Goal: Anxiety is at manageable level  Description: Interventions:  - Assess and monitor patient's anxiety level     - Monitor for signs and symptoms (heart palpitations, chest pain, shortness of breath, headaches, nausea, feeling jumpy, restlessness, irritable, apprehensive)  - Collaborate with interdisciplinary team and initiate plan and interventions as ordered    - [place] patient to unit/surroundings  - Explain treatment plan  - Encourage participation in care  - Encourage verbalization of concerns/fears  - Identify coping mechanisms  - Assist in developing anxiety-reducing skills  - Administer/offer alternative therapies  - Limit or eliminate stimulants  Outcome: Progressing     Problem: Risk for Violence/Aggression Toward Others  Goal: Treatment Goal: Refrain from acts of violence/aggression during length of stay, and demonstrate improved impulse control at the time of discharge  Outcome: Progressing  Goal: Verbalize thoughts and feelings  Description: Interventions:  - Assess and re-assess patient's level of risk, every waking shift  - Engage patient in 1:1 interactions, daily, for a minimum of 15 minutes   - Allow patient to express feelings and frustrations in a safe and non-threatening manner   - Establish rapport/trust with patient   Outcome: Progressing  Goal: Refrain from harming others  Outcome: Progressing  Goal: Refrain from destructive acts on the environment or property  Outcome: Progressing  Goal: Control angry outbursts  Description: Interventions:  - Monitor patient closely, per order  - Ensure early verbal de-escalation  - Monitor prn medication needs  - Set reasonable/therapeutic limits, outline behavioral expectations, and consequences   - Provide a non-threatening milieu, utilizing the least restrictive interventions   Outcome: Progressing  Goal: Attend and participate in unit activities, including therapeutic, recreational, and educational groups  Description: Interventions:  - Provide therapeutic and educational activities daily, encourage attendance and participation, and document same in the medical record   Outcome: Progressing  Goal: Identify appropriate positive anger management techniques  Description: Interventions:  - Offer anger management and coping skills groups   - Staff will provide positive feedback for appropriate anger control  Outcome: Progressing     Problem: Alteration in Orientation  Goal: Treatment Goal: Demonstrate a reduction of confusion and improved orientation to person, place, time and/or situation upon discharge, according to optimum baseline/ability  Outcome: Progressing  Goal: Interact with staff daily  Description: Interventions:  - Assess and re-assess patient's level of orientation  - Engage patient in 1 on 1 interactions, daily, for a minimum of 15 minutes   - Establish rapport/trust with patient   Outcome: Progressing  Goal: Express concerns related to confused thinking related to:  Description: Interventions:  - Encourage patient to express feelings, fears, frustrations, hopes  - Assign consistent caregivers   - [place]/re-orient patient as needed  - Allow comfort items, as appropriate  - Provide visual cues, signs, etc    Outcome: Progressing  Goal: Allow medical examinations, as recommended  Description: Interventions:  - Provide physical/neurological exams and/or referrals, per provider   Outcome: Progressing  Goal: Cooperate with recommended testing/procedures  Description: Interventions:  - Determine need for ancillary testing  - Observe for mental status changes  - Implement falls/precaution protocol   Outcome: Progressing  Goal: Attend and participate in unit activities, including therapeutic, recreational, and educational groups  Description: Interventions:  - Provide therapeutic and educational activities daily, encourage attendance and participation, and document same in the medical record   - Provide appropriate opportunities for reminiscence   - Provide a consistent daily routine   - Encourage family contact/visitation   Outcome: Progressing  Goal: Complete daily ADLs, including personal hygiene independently, as able  Description: Interventions:  - Observe, teach, and assist patient with ADLS  Outcome: Progressing     Problem: Individualized Interventions  Goal: Patient will verbalize appropriate use of telephone within 5 days  Description: Interventions:  - Treatment team to determine use of supervised phone privileges   Outcome: Progressing  Goal: Patient will verbalize need for hospitalization and will no longer attempt elopement within 5 days  Description: Interventions:  - Ongoing education to help patient understand need for hospitalization  Outcome: Progressing  Goal: Patient will recognize inappropriate behaviors and develop alternative behaviors within 5 days  Description: Interventions:  - Patient in collaboration with Treatment Team will develop a behavior management plan to help identify effective coping skills to deal with stressors  Outcome: Progressing

## 2021-05-05 NOTE — PROGRESS NOTES
05/05/21 1125   Team Meeting   Meeting Type Daily Rounds   Initial Conference Date 05/04/21   Patient/Family Present   Patient Present No   Patient's Family Present No       Daily Rounds Documentation  Team Members Participating  MD Gurpreet Barrios [facility]  Emmalene Krabbe, [place] [address] [place] [address]  Yves Leung RN    Case reviewed  Medication and meal compliant  Slept  Episode of becoming demanding with PRN, agitated and irritable when she was not able to get the medication she wanted

## 2021-05-05 NOTE — PROGRESS NOTES
Psychiatry Progress Note [facility] 53 y o  female MRN: [id number]  Unit/Bed#: [facility] 109-01 Encounter: [id number]  Code Status: Level 1 - Full Code    PCP: Leigha Small DO    Date of Admission:  4/7/2021 1435   Date of Service:  05/05/21    Patient Active Problem List   Diagnosis    Schizoaffective disorder, bipolar type ([facility] )    Uncomplicated alcohol dependence ([facility] )    Suicidal behavior    LYNN (generalized anxiety disorder)    Slow transit constipation    Deficiency of vitamin B    Degenerative disc disease, lumbar    Post-traumatic stress disorder, chronic    Lower extremity weakness    Generalized abdominal pain    Non-intractable vomiting    Medical clearance for psychiatric admission    Carbuncle    Alcohol dependence with unspecified alcohol-induced disorder ([facility] )    Mild intermittent asthma without complication    Tobacco abuse    Ear problem, bilateral     Review of systems:     Unremarkable  Diagnosis:        Schizoaffective bipolar, alcohol use disorder episodic in early remission in controlled environment  ,Assessment   Overall Status:       Continues to yell and scream at times but redirectable   Certification Statement: The patient will continue to require additional inpatient hospital stay due to ongoing mood swings paranoia   Acceptance by patient:  accepting    Hopefulness in recovery:  Living in a group home   Understanding of medications: has good understanding   Involved in reintegration process:  Adjusting to the unit   Trusting in relationship with psychiatrist:  trusting   Justification for dual anti-psychotics:  Not applicable   Side effects from treatment: none    Medication changes    haldol increased yesterday   Non-pharmacological treatments   Continue with individual, group, milieu and occupational therapy using recovery principles and psycho-education about accepting illness and the need for treatment     Safety   Safety and communication plan established to target dynamic risk factors discussed above  Discharge Plan     most likely to a group home with the act team    Interval Progress     Patient was overheard talking on the phone with someone that she was yelling and screaming to get extra medications to make her feel like on cloud nine! She was told that she needs to practice more positive coping skills rather than rely on p r n  medications to deal with his so-called " psychotic episodes"  Still believes staff is doing hypnosis on her and claims to hear voices which are not commanding and is attending some of the groups  Med seeking off and on       Sleep:         good   appetite:      good   compliance with medications :   good   Side  Effects:               None reported today   significant events:        Continues to yell and scream in response to voices and still paranoid seeking p r n  medications and med seeking    group attendance:    Attending most of the groups    Mental Status Exam  Appearance: age appropriate, casually dressed, adequate grooming, looks older than stated age, underweight     Behavior: normal, pleasant, cooperative, appears anxious but gets agitated when confronted about her statement on the phone with someone that she is asking for p r n  meds to feel like being on cloud 9  Speech: fluent, coherent, hypertalkative,   Circumstantial pressured    Mood: depressed, anxious, labile, euphoric  Affect: inappropriate, labile, reactive, slightly brighter  Thought Process: tends to be pressured and circumstantial and preoccupied perseverating on discharge    Thought Content: some paranoia, mild paranoia, ideas of reference, but does appear as if paranoid  No current suicidal homicidal thoughts intent or plans verbalized  No phobias obsessions compulsions or distorted body perceptions elicited      Still believes staff is doing hypnosis on her  Perceptual Disturbances: no auditory hallucinations, no visual hallucinations   To claim to hear voices but unable to elaborate what they tell her and claims to feel things are moving around her  Hx Risk Factors: chronic mood disorder, chronic psychotic symptoms, alcohol use  Sensorium:       Oriented to 3 spheres and to situation  Cognition: recent and remote memory grossly intact  Consciousness: alert and awake  Attention: attention span and concentration are age appropriate  Intellect: appears to be of average intelligence  Insight: improving  Judgement: improving  Motor Activity: no abnormal movements     Vitals  Temp:  [97 2 °F (36 2 °C)-97 5 °F (36 4 °C)] 97 2 °F (36 2 °C)  HR:  [76-83] 83  Resp:  [18-20] 20  BP: (106-118)/(63-71) 115/63  No intake or output data in the 24 hours ending 05/05/21 0549    Lab Results:  [facility] Admission Reviewed     Current Facility-Administered Medications   Medication Dose Route Frequency Provider Last Rate    acetaminophen  650 mg Oral Q6H PRN Sam Contras Lodics, CRNP      acetaminophen  650 mg Oral Q4H PRN Sam Contras Lodics, CRNP      acetaminophen  975 mg Oral Q6H PRN Sam Contras Germanics, CRNP      al mag oxide-diphenhydramine-lidocaine viscous  10 mL Swish & Swallow Q4H PRN María Elena Rust, CRNP      albuterol  2 puff Inhalation Q6H PRN Isis Lama, CRNP      aluminum-magnesium hydroxide-simethicone  15 mL Oral Q4H PRN Jasmyn Deng, CRNP      benztropine  1 mg Intramuscular Q4H PRN Max 6/day Isis Lama, CRNP      benztropine  1 mg Oral Q4H PRN Max 6/day Isis Lama, CRNP      benztropine  1 mg Oral BID Deidra Meyers MD      calcium carbonate  500 mg Oral TID PRN Samnolan Lama, CRNP      diphenhydrAMINE  25 mg Oral HS Isis Lama, CRNP      escitalopram  20 mg Oral Daily Isis Lama, CRNP      fenofibrate  145 mg Oral Daily Isis Lama, CRNP      gabapentin  600 mg Oral 4x Daily Isis Lama, CRNP      haloperidol  2 mg Oral TID Ana Marte MD  haloperidol  5 mg Oral Q6H PRN Isis Lama, CRNP      hydrOXYzine HCL  25 mg Oral Q6H PRN Max 4/day Isis Lama, CRNP      hydrOXYzine HCL  50 mg Oral Q6H PRN Max 4/day Isis Lama, CRNP      lidocaine  1 patch Topical Daily Isis Lama, CRNP      LORazepam  1 mg Intramuscular Q6H PRN Cristhian Martin MD      LORazepam  0 5 mg Oral 4x Daily Cristhian Martin MD      magnesium hydroxide  30 mL Oral Daily PRN Shavonne Oskim Porterics, CRNP      nicotine  1 patch Transdermal Daily Reggy Fees, CRNP      OLANZapine  2 5 mg Oral Q8H PRN Reggy Fees, CRNP      OLANZapine  5 mg Oral Q3H PRN Reggy Fees, CRNP      OLANZapine  7 5 mg Oral Q3H PRN Reggy Fees, CRNP      OLANZapine  10 mg Intramuscular Q3H PRN Reggy Fees, CRNP      OLANZapine  5 mg Intramuscular Q3H PRN Reggy Fees, CRNP      OLANZapine  10 mg Oral HS Hiren Handler, MD      OLANZapine  15 mg Oral Daily Hiren Handler, MD      Pramox-PE-Glycerin-Petrolatum  1 application Rectal 4x Daily PRN Isis Lama, CRNP      prazosin  1 mg Oral HS Reggy Fees, CRNP      psyllium  1 packet Oral Daily JARED Isbell         Counseling / Coordination of Care: Total floor / unit time spent today 15 minutes  Greater than 50% of total time was spent with the patient and / or family counseling and / or somewhat receptive to supportive listening and teaching positive coping skills to deal with symptom mangement  Patient's Rights, confidentiality and exceptions to confidentiality, use of automated medical record, [address] [place] [address] staff access to medical record, and consent to treatment reviewed  This note was  not shared with the patient because it might further aggravate her psychiatric condition  This note has been dictated

## 2021-05-05 NOTE — PROGRESS NOTES
05/05/21 1100   Activity/Group Checklist   Group   (Recovery Mtg )   Attendance Did not attend  (left group did not return )   Attendance Duration (min) 46-60   Affect/Mood ASHLEY

## 2021-05-05 NOTE — PLAN OF CARE
Problem: Alteration in Thoughts and Perception  Goal: Verbalize thoughts and feelings  Description: Interventions:  - Promote a nonjudgmental and trusting relationship with the patient through active listening and therapeutic communication  - Assess patient's level of functioning, behavior and potential for risk  - Engage patient in 1 on 1 interactions  - Encourage patient to express fears, feelings, frustrations, and discuss symptoms    - [place] patient to reality, help patient recognize reality-based thinking   - Administer medications as ordered and assess for potential side effects  - Provide the patient education related to the signs and symptoms of the illness and desired effects of prescribed medications  Outcome: Not Progressing  Goal: Refrain from acting on delusional thinking/internal stimuli  Description: Interventions:  - Monitor patient closely, per order   - Utilize least restrictive measures   - Set reasonable limits, give positive feedback for acceptable   - Administer medications as ordered and monitor of potential side effects  Outcome: Not Progressing  Goal: Agree to be compliant with medication regime, as prescribed and report medication side effects  Description: Interventions:  - Offer appropriate PRN medication and supervise ingestion; conduct AIMS, as needed   Outcome: Progressing  Goal: Recognize dysfunctional thoughts, communicate reality-based thoughts at the time of discharge  Description: Interventions:  - Provide medication and psycho-education to assist patient in compliance and developing insight into his/her illness   Outcome: Progressing  Goal: Complete daily ADLs, including personal hygiene independently, as able  Description: Interventions:  - Observe, teach, and assist patient with ADLS  - Monitor and promote a balance of rest/activity, with adequate nutrition and elimination   Outcome: Progressing
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
## 2021-05-05 NOTE — PROGRESS NOTES
05/05/21 0900   Activity/Group Checklist   Group Community meeting   Attendance Attended   Attendance Duration (min) 16-30   Interactions Interacted appropriately   Affect/Mood Appropriate;Calm;Normal range   Goals Achieved Identified feelings; Able to listen to others; Able to engage in interactions

## 2021-05-05 NOTE — PROGRESS NOTES
05/05/21 1127   Team Meeting   Meeting Type Daily Rounds   Initial Conference Date 05/05/21   Patient/Family Present   Patient Present No   Patient's Family Present No         Daily Rounds Documentation  Team Members Participating  MD Josafat Francisco, RN Alfred Soulier, [facility]  Emma Loving, [facility] Jeremiah, CPS  Zcahery Stringer RN    Case reviewed  Overheard by staff when she was on the phone, disclosing that she yells to get the medications she wants so she can be "on cloud 9 " Staff observe her manipulative behaviors  Discussed making changes to PRNs (to decrease them over time) and assisting patient to employ and practice her healthy coping skills  Has been discussing this with  in therapy  Pt reports her psychosis as periods of yelling, more related to anxiety than actual psychotic symptoms  Nurses directed to offer medications for anxiety instead of psychosis when these episodes occur

## 2021-05-06 PROCEDURE — 99232 SBSQ HOSP IP/OBS MODERATE 35: CPT | Performed by: PSYCHIATRY & NEUROLOGY

## 2021-05-06 RX ORDER — HALOPERIDOL 1 MG/1
2 TABLET ORAL 4 TIMES DAILY
Status: DISCONTINUED | OUTPATIENT
Start: 2021-05-06 | End: 2021-05-26

## 2021-05-06 RX ADMIN — HALOPERIDOL 2 MG: 1 TABLET ORAL at 08:22

## 2021-05-06 RX ADMIN — LIDOCAINE 1 PATCH: 50 PATCH CUTANEOUS at 08:23

## 2021-05-06 RX ADMIN — LORAZEPAM 0.5 MG: 0.5 TABLET ORAL at 21:04

## 2021-05-06 RX ADMIN — LORAZEPAM 0.5 MG: 0.5 TABLET ORAL at 11:35

## 2021-05-06 RX ADMIN — DIPHENHYDRAMINE HCL 25 MG: 25 TABLET ORAL at 21:04

## 2021-05-06 RX ADMIN — NICOTINE 1 PATCH: 21 PATCH, EXTENDED RELEASE TRANSDERMAL at 08:25

## 2021-05-06 RX ADMIN — OLANZAPINE 10 MG: 10 TABLET, FILM COATED ORAL at 21:04

## 2021-05-06 RX ADMIN — HALOPERIDOL 2 MG: 1 TABLET ORAL at 17:31

## 2021-05-06 RX ADMIN — HALOPERIDOL 2 MG: 1 TABLET ORAL at 11:35

## 2021-05-06 RX ADMIN — ALUMINUM HYDROXIDE, MAGNESIUM HYDROXIDE, AND SIMETHICONE 15 ML: 200; 200; 20 SUSPENSION ORAL at 14:18

## 2021-05-06 RX ADMIN — GABAPENTIN 600 MG: 300 CAPSULE ORAL at 08:22

## 2021-05-06 RX ADMIN — PSYLLIUM HUSK 1 PACKET: 3.4 POWDER ORAL at 08:23

## 2021-05-06 RX ADMIN — PHENYTOIN 1 MG: 125 SUSPENSION ORAL at 21:04

## 2021-05-06 RX ADMIN — BENZTROPINE MESYLATE 1 MG: 1 TABLET ORAL at 08:22

## 2021-05-06 RX ADMIN — ESCITALOPRAM OXALATE 20 MG: 10 TABLET ORAL at 08:22

## 2021-05-06 RX ADMIN — FENOFIBRATE 145 MG: 145 TABLET ORAL at 08:23

## 2021-05-06 RX ADMIN — OLANZAPINE 15 MG: 5 TABLET, FILM COATED ORAL at 08:22

## 2021-05-06 RX ADMIN — LORAZEPAM 0.5 MG: 0.5 TABLET ORAL at 08:22

## 2021-05-06 RX ADMIN — GABAPENTIN 600 MG: 300 CAPSULE ORAL at 11:35

## 2021-05-06 RX ADMIN — LORAZEPAM 0.5 MG: 0.5 TABLET ORAL at 17:30

## 2021-05-06 RX ADMIN — HALOPERIDOL 2 MG: 1 TABLET ORAL at 21:04

## 2021-05-06 RX ADMIN — GABAPENTIN 600 MG: 300 CAPSULE ORAL at 21:03

## 2021-05-06 RX ADMIN — BENZTROPINE MESYLATE 1 MG: 1 TABLET ORAL at 17:30

## 2021-05-06 RX ADMIN — GABAPENTIN 600 MG: 300 CAPSULE ORAL at 17:30

## 2021-05-06 NOTE — SOCIAL WORK
PC received from [facility] at [address]  Patient's next Felix Sharif was last given 2/23/21 at 819 mg  Next due 5/18/21  Psychiatrist/RN please confirm if patient wants to continue on this with current dose or go monthly, as she has inquired about this

## 2021-05-06 NOTE — PROGRESS NOTES
05/06/21 1039   Team Meeting   Meeting Type Daily Rounds   Initial Conference Date 05/06/21   Patient/Family Present   Patient Present No   Patient's Family Present No         Daily Rounds Documentation  Team Members Participating  MD Chepe Messer, YUDY Scott, LSW  Pranav Avila, DECLANW  Gerardo Lema, ANNALISE Rodríguez RN    Case reviewed  Periods of screaming, attention seeking behavior  Had productive therapy session yesterday, identifying her yelling is often triggered by thoughts of being hypnotized and controlled by people she cannot identify  Blaming her eating issues and ongoing homelessness on the hypnotism  Receptive to grounding techniques and journaling to cope

## 2021-05-06 NOTE — PROGRESS NOTES
05/06/21 0900   Activity/Group Checklist   Group Community meeting   Attendance Attended   Attendance Duration (min) 16-30   Interactions Interacted appropriately   Affect/Mood Appropriate;Bright;Calm;Normal range   Goals Achieved Able to listen to others; Able to engage in interactions

## 2021-05-06 NOTE — NURSING NOTE
Patient is quiet and cooperative today   She is visible on the unit but mostly isolative to self    She is irritable at times  Vicki Garvey continues to be on the phone a lot making calls to friends and family  Vicki Garvey did have PRN mylanta today   Minimal yelling noted   Continue to monitor

## 2021-05-06 NOTE — NURSING NOTE
Martinez Hi maintained on ongoing SAFE precaution without incident on this shift   Laying in bed with eyes closed, breath even and unlabored   Q 7 minutes rounding implemented continuously   No behavioral noted   Will continue to monitor

## 2021-05-06 NOTE — PROGRESS NOTES
05/06/21 1100   Activity/Group Checklist   Group   (Recovery Management )   Attendance Other (Comment)  (Left Group )   Attendance Duration (min) 46-60   Affect/Mood ASHLEY

## 2021-05-06 NOTE — PROGRESS NOTES
Psychiatry Progress Note [facility] 53 y o  female MRN: [id number]  Unit/Bed#: [facility] 109-01 Encounter: [id number]  Code Status: Level 1 - Full Code    PCP: Alia Bey DO    Date of Admission:  4/7/2021 1435   Date of Service:  05/06/21    Patient Active Problem List   Diagnosis    Schizoaffective disorder, bipolar type ([facility] )    [facility]    Suicidal behavior    LYNN (generalized anxiety disorder)    Slow transit constipation    Deficiency of vitamin B    Degenerative disc disease, lumbar    Post-traumatic stress disorder, chronic    Lower extremity weakness    Generalized abdominal pain    Non-intractable vomiting    Medical clearance for psychiatric admission    Carbuncle    Alcohol dependence with unspecified alcohol-induced disorder ([facility] )    Mild intermittent asthma without complication    Tobacco abuse    Ear problem, bilateral     Review of systems:      unremarkable  Diagnosis:        Schizoaffective bipolar, alcohol use disorder episodic in early remission in controlled environment  ,Assessment   Overall Status:        Continues to yell and scream at times but redirectable   Certification Statement: The patient will continue to require additional inpatient hospital stay due to ongoing mood swings paranoia   Acceptance by patient:  accepting    Hopefulness in recovery:  Living in a group home   Understanding of medications: has good understanding   Involved in reintegration process:  Adjusting to the unit   Trusting in relationship with psychiatrist:  trusting   Justification for dual anti-psychotics:  Not applicable   Side effects from treatment: none    Medication changes    haldol increased as 2 mg 4 times a day  Non-pharmacological treatments   Continue with individual, group, milieu and occupational therapy using recovery principles and psycho-education about accepting illness and the need for treatment  Safety   Safety and communication plan established to target dynamic risk factors discussed above  Discharge Plan     most likely to a group home with the act team    Interval Progress     Patient was upset when confronted with the fact that she was overheard on the phone with someone that she was yelling and screaming to get extra medications to make her feel like on cloud 9 and was threatening to [facility] the [facility] for that  She was claiming that she never made such a statement  She was happy when told that we are going to increase her Haldol as 2 melena 4 times a day which she believes her with the voices  She claims she is not anxious but upset when she screams  Because of" psychotic episodes' which is most likely drug-seeking behavior  She understands that she needs to practice more positive skills rather than rely on p r n  medications  Patient continues to believe that some of the staff is doing hypnosis on and claims to hear voices which she cannot elaborate and not commanding   She was reportedly screaming loudly in session with her therapist Perez Blair yesterday      Sleep:          good   appetite:       good   compliance with medications :   good   Side  Effects:                None reported   significant events:         Continues to scream in response to voices and still paranoid seeking p r n  medications at time but less often   group attendance:     Attending most of the groups    Mental Status Exam  Appearance: age appropriate, casually dressed, adequate grooming, looks older than stated age, underweight     Behavior: normal, pleasant, cooperative, appears anxious, but gets agitated when confronted about her statement on the phone with someone that she is asking for p r n  meds to feel like being on cloud 9    Speech: fluent, coherent, hypertalkative,   Circumstantial pressured    Mood: depressed, anxious, labile, euphoric  Affect: inappropriate, labile, reactive, slightly brighter  Thought Process: tends to be pressured and circumstantial and preoccupied perseverating on discharge    Thought Content: some paranoia, mild paranoia, ideas of reference, but does appear as if paranoid  No current suicidal homicidal thoughts intent or plans verbalized  No phobias obsessions compulsions or distorted body perceptions elicited  Perceptual Disturbances: no auditory hallucinations, no visual hallucinations, To claim to hear voices but unable to elaborate what they tell her and claims to feel things are moving around her     Hx Risk Factors: chronic mood disorder, chronic psychotic symptoms, alcohol use, limted insight  Sensorium:        Oriented to 3 spheres and to situation  Cognition: recent and remote memory grossly intact  Consciousness: alert and awake  Attention: attention span and concentration are age appropriate  Intellect: appears to be of average intelligence  Insight: improving  Judgement: improving  Motor Activity: no abnormal movements     Vitals  Temp:  [97 3 °F (36 3 °C)-97 7 °F (36 5 °C)] 97 3 °F (36 3 °C)  HR:  [77-85] 83  Resp:  [18] 18  BP: (112-123)/(57-82) 112/57  No intake or output data in the 24 hours ending 05/06/21 9948    Lab Results:  [facility] Admission Reviewed     Current Facility-Administered Medications   Medication Dose Route Frequency Provider Last Rate    acetaminophen  650 mg Oral Q6H PRN JARED Isabel      acetaminophen  650 mg Oral Q4H PRN JARED Isabel      acetaminophen  975 mg Oral Q6H PRN JARED Jones      al mag oxide-diphenhydramine-lidocaine viscous  10 mL Swish & Swallow Q4H PRN JARED Linton      albuterol  2 puff Inhalation Q6H PRN JARED Jones      aluminum-magnesium hydroxide-simethicone  15 mL Oral Q4H PRN JARED Isbell      benztropine  1 mg Intramuscular Q4H PRN Max 6/day JARED Jones      benztropine  1 mg Oral Q4H PRN Max 6/day JARED Isabel  benztropine  1 mg Oral BID Woodrow Burkitt, MD      calcium carbonate  500 mg Oral TID PRN [facility] Daina, CRNP      diphenhydrAMINE  25 mg Oral HS Isis Lama, CRNP      escitalopram  20 mg Oral Daily Isis Lama, CRNP      fenofibrate  145 mg Oral Daily Isis Lama, CRNP      gabapentin  600 mg Oral 4x Daily Isis Lama, CRNP      haloperidol  2 mg Oral 4x Daily Jean-Paul Kan MD      haloperidol  5 mg Oral Q6H PRN [facility] Daina, CRNP      hydrOXYzine HCL  25 mg Oral Q6H PRN Max 4/day Isis Lama, CRNP      hydrOXYzine HCL  50 mg Oral Q6H PRN Max 4/day Isis Lama, CRNP      lidocaine  1 patch Topical Daily Isis Lama, CRNP      LORazepam  1 mg Intramuscular Q6H PRN Jean-Paul Kan MD      LORazepam  0 5 mg Oral 4x Daily Jean-Paul Kan MD      magnesium hydroxide  30 mL Oral Daily PRN [facility] Daina, CRNP      nicotine  1 patch Transdermal Daily Suann Friendly, CRNP      OLANZapine  2 5 mg Oral Q8H PRN Suann Friendly, CRNP      OLANZapine  5 mg Oral Q3H PRN Suann Friendly, CRNP      OLANZapine  7 5 mg Oral Q3H PRN Suann Friendly, CRNP      OLANZapine  10 mg Intramuscular Q3H PRN Suann Friendly, CRNP      OLANZapine  5 mg Intramuscular Q3H PRN Suann Friendly, CRNP      OLANZapine  10 mg Oral HS Kurt Novoa MD      OLANZapine  15 mg Oral Daily Kurt Novoa MD      Pramox-PE-Glycerin-Petrolatum  1 application Rectal 4x Daily PRN Isis Lama, CRNP      prazosin  1 mg Oral HS Suann Friendly, CRNP      psyllium  1 packet Oral Daily JARED Isbell         Counseling / Coordination of Care: Total floor / unit time spent today 15 minutes  Greater than 50% of total time was spent with the patient and / or family counseling and / or somewhat receptive to supportive listening and teaching positive coping skills to deal with symptom mangement       Patient's Rights, confidentiality and exceptions to confidentiality, use of automated medical record, [facility]ie At[facility] staff access to medical record, and consent to treatment reviewed  This note was  not shared with the patient because it might further aggravate her psychiatric condition  This note has been dictated

## 2021-05-06 NOTE — SOCIAL WORK
SETH placed PC back to [facility] and left VM with request to clarify the medication, dose, and next due date for BLANCA per psychatrist request, as information received yesterday from act team appears incorrect  Awaiting response

## 2021-05-07 PROCEDURE — 99232 SBSQ HOSP IP/OBS MODERATE 35: CPT | Performed by: PSYCHIATRY & NEUROLOGY

## 2021-05-07 RX ADMIN — LORAZEPAM 0.5 MG: 0.5 TABLET ORAL at 21:04

## 2021-05-07 RX ADMIN — GABAPENTIN 600 MG: 300 CAPSULE ORAL at 08:17

## 2021-05-07 RX ADMIN — LORAZEPAM 0.5 MG: 0.5 TABLET ORAL at 17:01

## 2021-05-07 RX ADMIN — LORAZEPAM 0.5 MG: 0.5 TABLET ORAL at 12:30

## 2021-05-07 RX ADMIN — OLANZAPINE 10 MG: 10 TABLET, FILM COATED ORAL at 21:05

## 2021-05-07 RX ADMIN — PSYLLIUM HUSK 1 PACKET: 3.4 POWDER ORAL at 08:18

## 2021-05-07 RX ADMIN — FENOFIBRATE 145 MG: 145 TABLET ORAL at 08:21

## 2021-05-07 RX ADMIN — GABAPENTIN 600 MG: 300 CAPSULE ORAL at 21:05

## 2021-05-07 RX ADMIN — NICOTINE 1 PATCH: 21 PATCH, EXTENDED RELEASE TRANSDERMAL at 08:20

## 2021-05-07 RX ADMIN — LORAZEPAM 0.5 MG: 0.5 TABLET ORAL at 08:17

## 2021-05-07 RX ADMIN — GABAPENTIN 600 MG: 300 CAPSULE ORAL at 17:00

## 2021-05-07 RX ADMIN — GABAPENTIN 600 MG: 300 CAPSULE ORAL at 12:30

## 2021-05-07 RX ADMIN — HALOPERIDOL 2 MG: 1 TABLET ORAL at 17:01

## 2021-05-07 RX ADMIN — OLANZAPINE 15 MG: 5 TABLET, FILM COATED ORAL at 08:17

## 2021-05-07 RX ADMIN — BENZTROPINE MESYLATE 1 MG: 1 TABLET ORAL at 08:18

## 2021-05-07 RX ADMIN — ESCITALOPRAM OXALATE 20 MG: 10 TABLET ORAL at 08:17

## 2021-05-07 RX ADMIN — GLYCERIN, PETROLATUM, PHENYLEPHRINE HCL, PRAMOXINE HCL 1 APPLICATION: 144; 2.5; 10; 15 CREAM TOPICAL at 10:28

## 2021-05-07 RX ADMIN — BENZTROPINE MESYLATE 1 MG: 1 TABLET ORAL at 17:01

## 2021-05-07 RX ADMIN — HALOPERIDOL 2 MG: 1 TABLET ORAL at 12:30

## 2021-05-07 RX ADMIN — HALOPERIDOL 2 MG: 1 TABLET ORAL at 21:05

## 2021-05-07 RX ADMIN — DIPHENHYDRAMINE HCL 25 MG: 25 TABLET ORAL at 21:05

## 2021-05-07 RX ADMIN — ALUMINUM HYDROXIDE, MAGNESIUM HYDROXIDE, AND SIMETHICONE 15 ML: 200; 200; 20 SUSPENSION ORAL at 10:27

## 2021-05-07 RX ADMIN — HALOPERIDOL 2 MG: 1 TABLET ORAL at 08:17

## 2021-05-07 RX ADMIN — LIDOCAINE 1 PATCH: 50 PATCH CUTANEOUS at 08:18

## 2021-05-07 RX ADMIN — PHENYTOIN 1 MG: 125 SUSPENSION ORAL at 21:04

## 2021-05-07 RX ADMIN — ALUMINUM HYDROXIDE, MAGNESIUM HYDROXIDE, AND SIMETHICONE 15 ML: 200; 200; 20 SUSPENSION ORAL at 16:14

## 2021-05-07 NOTE — PLAN OF CARE
Problem: Alteration in Thoughts and Perception  Goal: Verbalize thoughts and feelings  Description: Interventions:  - Promote a nonjudgmental and trusting relationship with the patient through active listening and therapeutic communication  - Assess patient's level of functioning, behavior and potential for risk  - Engage patient in 1 on 1 interactions  - Encourage patient to express fears, feelings, frustrations, and discuss symptoms    - [place] patient to reality, help patient recognize reality-based thinking   - Administer medications as ordered and assess for potential side effects  - Provide the patient education related to the signs and symptoms of the illness and desired effects of prescribed medications  Outcome: Not Progressing  Goal: Refrain from acting on delusional thinking/internal stimuli  Description: Interventions:  - Monitor patient closely, per order   - Utilize least restrictive measures   - Set reasonable limits, give positive feedback for acceptable   - Administer medications as ordered and monitor of potential side effects  Outcome: Not Progressing  Goal: Agree to be compliant with medication regime, as prescribed and report medication side effects  Description: Interventions:  - Offer appropriate PRN medication and supervise ingestion; conduct AIMS, as needed   Outcome: Progressing  Goal: Attend and participate in unit activities, including therapeutic, recreational, and educational groups  Description: Interventions:  -Encourage Visitation and family involvement in care  Outcome: Progressing  Goal: Complete daily ADLs, including personal hygiene independently, as able  Description: Interventions:  - Observe, teach, and assist patient with ADLS  - Monitor and promote a balance of rest/activity, with adequate nutrition and elimination   Outcome: Progressing

## 2021-05-07 NOTE — PROGRESS NOTES
Progress Note - Behavioral Health     Cm Reeves 53 y o  female MRN: [id number]   [facility] 078-69 Encounter: [id number]    Per Nursing: Nursing reports that patient has been irritable and labile and attention seeking on the unit  She had approached the nursing station and requested PRN Haldol to help her sleep  She called staff names and cursed and continued to exhibit problematic behaviors  She has remained intrusive, manipulative and labile  She refused dinner last evening  Per Patient: Patient initially presents as calm and cooperative when interacting with provider  She states that she is feeling "pretty good, I'm starting to stabilize and I'm on the right medications  I feel stable " She then begins to say that through Anabaptism, she is now having an "awakening to reality " She states that she is going to focus on her spirituality so she can be blessed and loved by God and her family, and she knows she will be normal when she gets out soon  She states that she has not had any auditory or visual hallucinations  She states that she would never want to harm herself  She has not had any active or passive suicidal ideation, intent or plan  Later on the unit, she is observed rapidly pacing the hallways, screaming at staff and herself  She is in her room screaming about Anabaptism and staff and peers being sinners  She is extremely labile and bizarre  Behavior over the last 24 hours: unchanged  Sleep: normal  Appetite: refused dinner  Medication side effects: No   ROS: no complaints, all other systems are negative  Any positives in the Comprehensive Review of Systems were noted in the HPI  All other Review of Systems were negative          Mental Status Evaluation:    Appearance:  casually dressed, wearing a dress and a robe   Behavior:  agitated, angry, bizarre, pleasant and cooperative and calm when first speaking with provider, later witnessed rapidly pacing the hallways and talking loudly to herself, confrontational toward staff and peers, later heard cursing and screaming loudly in her room and the hallways   Speech:  loud, screaming   Mood:  labile   Affect:  inappropriate, labile, expansive, increased in intensity   Thought Process:  disorganized, illogical   Associations: loose associations   Thought Content:  persecutory delusions, Yazidi preoccupation   Perceptual Disturbances: denies auditory hallucinations when asked, talks to self at times, denies when asked, but talks to self at times   Risk Potential: Suicidal ideation - None at present  Homicidal ideation - None at present  Potential for aggression - Yes, due to agitation   Sensorium:  oriented to person, place and time/date   Memory:  recent and remote memory grossly intact   Consciousness:  alert and awake   Attention: attention span and concentration appear shorter than expected for age   Insight:  impaired   Judgment: impaired   Gait/Station: normal gait/station   Motor Activity: no abnormal movements     Vital signs in last 24 hours:  Vitals:    05/08/21 0727   BP: 126/70   Pulse: 86   Resp: 16   Temp: 97 6 °F (36 4 °C)         Laboratory results:   I have personally reviewed all pertinent laboratory/tests results    Most Recent Labs:   Lab Results   Component Value Date    WBC 6 80 04/09/2021    RBC 4 32 04/09/2021    HGB 13 0 04/09/2021    HCT 39 5 04/09/2021     04/09/2021    RDW 14 3 04/09/2021    NEUTROABS 7 10 (H) 03/16/2021    SODIUM 139 05/03/2021    K 4 4 05/03/2021     05/03/2021    CO2 30 05/03/2021    BUN 14 05/03/2021    CREATININE 0 61 05/03/2021    GLUC 94 05/03/2021    GLUF 94 05/03/2021    CALCIUM 9 8 05/03/2021    AST 25 05/03/2021    ALT 18 05/03/2021    ALKPHOS 56 05/03/2021    TP 7 2 05/03/2021    ALB 4 2 05/03/2021    TBILI 0 25 05/03/2021    CHOLESTEROL 300 (H) 03/31/2021    HDL 57 03/31/2021    TRIG 658 (H) 03/31/2021    LDLCALC  03/31/2021      Comment:      Calculated LDL invalid, triglycerides >400 mg/dl    NONHDLC 147 08/23/2020    AMMONIA 28 10/27/2017    VUT6DITUYODU 3 810 04/09/2021    FREET4 0 89 03/24/2016    PREGUR negative 03/16/2021    PREGSERUM Negative 10/21/2019    HCG <2 00 04/10/2014    RPR Non-Reactive 03/31/2021    HGBA1C 5 0 08/06/2019    EAG 97 08/06/2019       Progress Toward Goals: progressing    Assessment/Plan   Principal Problem:    Schizoaffective disorder, bipolar type ([facility] [address] )  Active Problems:    Post-traumatic stress disorder, chronic    Medical clearance for psychiatric admission    Mild intermittent asthma without complication    Ear problem, bilateral    Recommended Treatment:     Planned medication and treatment changes:     All current active medications have been reviewed  Encourage group therapy, milieu therapy and occupational therapy  Behavioral Health checks every 7 minutes  Continue current medications:  Current Facility-Administered Medications   Medication Dose Route Frequency Provider Last Rate    acetaminophen  650 mg Oral Q6H PRN Kimberly Lama, CRNP      acetaminophen  650 mg Oral Q4H PRN Kimberly Lama, CRNP      acetaminophen  975 mg Oral Q6H PRN Isis Lama, RENATANP      al mag oxide-diphenhydramine-lidocaine viscous  10 mL Swish & Swallow Q4H PRN JARED Linton      albuterol  2 puff Inhalation Q6H PRN Isis Lama, JARED      aluminum-magnesium hydroxide-simethicone  15 mL Oral Q4H PRN Jasmyn Deng, RENATANP      benztropine  1 mg Intramuscular Q4H PRN Max 6/day Isis Lama, CRNP      benztropine  1 mg Oral Q4H PRN Max 6/day Isis Lama, CRNP      benztropine  1 mg Oral BID Karyle Booker, MD      calcium carbonate  500 mg Oral TID PRN Kimberly Lama, RENATANP      diphenhydrAMINE  25 mg Oral HS Isis Lama, JARED      escitalopram  20 mg Oral Daily Isis Lama, RENATANP      fenofibrate  145 mg Oral Daily Isis Lama, RENATANP      gabapentin  600 mg Oral 4x Daily JARED Waters haloperidol  2 mg Oral 4x Daily Jenna Lind MD      haloperidol  5 mg Oral Q6H PRN Clay Lama, CRNP      hydrOXYzine HCL  25 mg Oral Q6H PRN Max 4/day Isis Lama, CRNP      hydrOXYzine HCL  50 mg Oral Q6H PRN Max 4/day Isis Lama, CRNP      lidocaine  1 patch Topical Daily Isis Lama, CRNP      LORazepam  1 mg Intramuscular Q6H PRN Jenna Lind MD      LORazepam  0 5 mg Oral 4x Daily Jenna Lind MD      magnesium hydroxide  30 mL Oral Daily PRN Clay Lama, CRNP      nicotine  1 patch Transdermal Daily Raquel Cooler, CRNP      OLANZapine  2 5 mg Oral Q8H PRN Raquel Cooler, CRNP      OLANZapine  5 mg Oral Q3H PRN Raquel Cooler, CRNP      OLANZapine  7 5 mg Oral Q3H PRN Raquel Cooler, CRNP      OLANZapine  10 mg Intramuscular Q3H PRN Raquel Cooler, CRNP      OLANZapine  5 mg Intramuscular Q3H PRN Raquel Cooler, CRNP      OLANZapine  10 mg Oral HS Iqra Caal MD      OLANZapine  15 mg Oral Daily Mary Pacheco MD      Pramox-PE-Glycerin-Petrolatum  1 application Rectal 4x Daily PRN Isis Lama, CRNP      prazosin  1 mg Oral HS Raquel Cooler, JARED      psyllium  1 packet Oral Daily JARED Isbell             Plan:  1) Patient initially presents as calm and cooperative during interaction with provider, however she is later witnessed rapidly pacing the hallways and becomes acutely agitated, screaming and yelling in her room, appearing religiously preoccupied  Per staff, this is patient's baseline behavior     2) Continue all current medications as directed:    Cogentin 1 mg twice daily   Benadryl 25 mg once daily at bedtime   Lexapro 20 mg once daily   Gabapentin 600 mg four times daily   Haldol 2 mg four times daily   Ativan 0 5 mg four times daily   Zyprexa 15 mg once daily in the morning and 10 mg once daily at bedtime   Prazosin 1 mg once daily at bedtime  3) Medical   All medical comorbidities are under the care of [facility]  4) Continue to work with Case Management to determine patient's disposition following discharge  Risks / Benefits of Treatment:    Risks, benefits, and possible side effects of medications explained to patient and patient verbalizes understanding and agreement for treatment  Counseling / Coordination of Care:    Patient's progress reviewed with nursing staff  Medications, treatment progress and treatment plan reviewed with patient      Elizabeth Wynne PA-C 05/08/21

## 2021-05-07 NOTE — PLAN OF CARE
Problem: Alteration in Thoughts and Perception  Goal: Attend and participate in unit activities, including therapeutic, recreational, and educational groups  Description: Interventions:  -Encourage Visitation and family involvement in care  Outcome: Progressing    Patient completed Goal Card for the week of 5/10/21  With assistance  Goals:  Work on tablet for groups              Concentrate on more journaling              Work on dental and eye care while here

## 2021-05-07 NOTE — PLAN OF CARE
Problem: Alteration in Thoughts and Perception  Goal: Verbalize thoughts and feelings  Description: Interventions:  - Promote a nonjudgmental and trusting relationship with the patient through active listening and therapeutic communication  - Assess patient's level of functioning, behavior and potential for risk  - Engage patient in 1 on 1 interactions  - Encourage patient to express fears, feelings, frustrations, and discuss symptoms    - [place] patient to reality, help patient recognize reality-based thinking   - Administer medications as ordered and assess for potential side effects  - Provide the patient education related to the signs and symptoms of the illness and desired effects of prescribed medications  Outcome: Progressing  Goal: Refrain from acting on delusional thinking/internal stimuli  Description: Interventions:  - Monitor patient closely, per order   - Utilize least restrictive measures   - Set reasonable limits, give positive feedback for acceptable   - Administer medications as ordered and monitor of potential side effects  Outcome: Progressing  Goal: Agree to be compliant with medication regime, as prescribed and report medication side effects  Description: Interventions:  - Offer appropriate PRN medication and supervise ingestion; conduct AIMS, as needed   Outcome: Progressing  Goal: Attend and participate in unit activities, including therapeutic, recreational, and educational groups  Description: Interventions:  -Encourage Visitation and family involvement in care  Outcome: Progressing  Goal: Recognize dysfunctional thoughts, communicate reality-based thoughts at the time of discharge  Description: Interventions:  - Provide medication and psycho-education to assist patient in compliance and developing insight into his/her illness   Outcome: Progressing     Problem: Ineffective Coping  Goal: Participates in unit activities  Description: Interventions:  - Provide therapeutic environment   - Provide required programming   - Redirect inappropriate behaviors   Outcome: Not Progressing  Goal: Understands least restrictive measures  Description: Interventions:  - Utilize least restrictive behavior  Outcome: Progressing  Goal: Free from restraint events  Description: - Utilize least restrictive measures   - Provide behavioral interventions   - Redirect inappropriate behaviors   Outcome: Progressing     Problem: Risk for Self Injury/Neglect  Goal: Verbalize thoughts and feelings  Description: Interventions:  - Assess and re-assess patient's lethality and potential for self-injury  - Engage patient in 1:1 interactions, daily, for a minimum of 15 minutes  - Encourage patient to express feelings, fears, frustrations, hopes  - Establish rapport/trust with patient   Outcome: Progressing  Goal: Refrain from harming self  Description: Interventions:  - Monitor patient closely, per order  - Develop a trusting relationship  - Supervise medication ingestion, monitor effects and side effects   Outcome: Progressing  Goal: Attend and participate in unit activities, including therapeutic, recreational, and educational groups  Description: Interventions:  - Provide therapeutic and educational activities daily, encourage attendance and participation, and document same in the medical record  - Obtain collateral information, encourage visitation and family involvement in care   Outcome: Progressing  Goal: Recognize maladaptive responses and adopt new coping mechanisms  Outcome: Progressing     Problem: Depression  Goal: Refrain from harming self  Description: Interventions:  - Monitor patient closely, per order   - Supervise medication ingestion, monitor effects and side effects   Outcome: Progressing  Goal: Refrain from isolation  Description: Interventions:  - Develop a trusting relationship   - Encourage socialization   Outcome: Progressing  Goal: Refrain from self-neglect  Outcome: Progressing  Goal: Attend and participate in unit activities, including therapeutic, recreational, and educational groups  Description: Interventions:  - Provide therapeutic and educational activities daily, encourage attendance and participation, and document same in the medical record   Outcome: Progressing     Problem: Anxiety  Goal: Anxiety is at manageable level  Description: Interventions:  - Assess and monitor patient's anxiety level  - Monitor for signs and symptoms (heart palpitations, chest pain, shortness of breath, headaches, nausea, feeling jumpy, restlessness, irritable, apprehensive)  - Collaborate with interdisciplinary team and initiate plan and interventions as ordered    - [place] patient to unit/surroundings  - Explain treatment plan  - Encourage participation in care  - Encourage verbalization of concerns/fears  - Identify coping mechanisms  - Assist in developing anxiety-reducing skills  - Administer/offer alternative therapies  - Limit or eliminate stimulants  Outcome: Progressing     Problem: Risk for Violence/Aggression Toward Others  Goal: Refrain from harming others  Outcome: Progressing  Goal: Refrain from destructive acts on the environment or property  Outcome: Progressing  Goal: Control angry outbursts  Description: Interventions:  - Monitor patient closely, per order  - Ensure early verbal de-escalation  - Monitor prn medication needs  - Set reasonable/therapeutic limits, outline behavioral expectations, and consequences   - Provide a non-threatening milieu, utilizing the least restrictive interventions   Outcome: Progressing

## 2021-05-07 NOTE — NURSING NOTE
Arlyn Schirmer maintained on ongoing SAFE precaution without incident on this shift   Laying in bed with eyes closed, breath even and unlabored  Q 7 minutes rounding implemented    Behavioral control   Will continue to monitor

## 2021-05-07 NOTE — NURSING NOTE
Patient is extremely upset this morning because she was not able to fill out her menu due to the fact that she did not come out for vitals  She then proceeded to call all staff "heartless bitches and liars"  She also stated she "hopes we get shot"  She was able to regain control and ate her breakfast   She continued to be needy, intrusive and attention seeking throughout the day  She had PRN mylanta and preparation H  Continue to monitor

## 2021-05-07 NOTE — NURSING NOTE
Kay Ross has been visible  Patient has been irritable, labile, and attention seeking during the shift  Patient has come up to the nurses station requesting Haldol and other PRN medications several times  Writer asked patient what she needs it for and patient stated "I'm just having a bad day it will help me sleep " Writer proceeded to explain to patient that it is not ordered for that and would soon be getting her 4:00 pm medications  Patient proceed to storm off angrily calling staff names and cursing  Kay Ross had continued behaviors during the shift slamming doors and stating "I am a mental health patient I should not get treated like this I have mental issues " Patient was reminded several times of appropriate behaviors  She continues to be manipulative, intrusive, and labile  Patient refused dinner  Attended wrap up and therapeutic activity group  Continue to monitor

## 2021-05-07 NOTE — PROGRESS NOTES
05/07/21 0900   Activity/Group Checklist   Group Community meeting  ( )   Attendance Attended   Attendance Duration (min) 16-30   Interactions Interacted appropriately   Affect/Mood Appropriate;Bright;Calm;Normal range   Goals Achieved Identified feelings; Able to listen to others; Able to engage in interactions

## 2021-05-07 NOTE — PROGRESS NOTES
05/07/21 0700   Activity/Group Checklist   Group   (Coffee Talk )   Attendance Attended   Attendance Duration (min) 46-60   Interactions Did not interact   Affect/Mood Constricted   Goals Achieved Able to listen to others

## 2021-05-07 NOTE — PROGRESS NOTES
53 y o  was seen today, on unit  According to the patient and nursing staff, the following is reported: nursing reporting that she has had delusional thoughts, reported auditory hallucinations, verbal agitation at times  Patient reporting mood has been up and down  Sleep and appetite are fair  Has been complaint with medications and no agitation witnessed  Medications reviewed, denies SI/HI, denies A/V acharya at present time        Mental Status Evaluation:  Appearance:  Adequate hygiene and grooming and Good eye contact   Behavior:  cooperative and evasive   Mood:  anxious   Affect: constricted   Speech: Normal rate and Normal volume   Thought Process:  Goal directed and coherent   Thought Content:  Does not verbalize delusional material   Perceptual Disturbances: Denies hallucinations and does not appear to be responding to internal stimuli   Risk Potential: No suicidal or homicidal ideation   Orientation:          Current Facility-Administered Medications   Medication Dose Route Frequency Provider Last Rate    acetaminophen  650 mg Oral Q6H PRN Mortimer Bridges Lodics, CRNP      acetaminophen  650 mg Oral Q4H PRN Mortimer Bridges Lodics, CRNP      acetaminophen  975 mg Oral Q6H PRN Isis Lama, CRNP      al mag oxide-diphenhydramine-lidocaine viscous  10 mL Swish & Swallow Q4H PRN Dale Collar, CRNP      albuterol  2 puff Inhalation Q6H PRN Isis Lama, CRNP      aluminum-magnesium hydroxide-simethicone  15 mL Oral Q4H PRN Jasmyn Deng, CRNP      benztropine  1 mg Intramuscular Q4H PRN Max 6/day Isis Lama, CRNP      benztropine  1 mg Oral Q4H PRN Max 6/day Isis Lama, CRNP      benztropine  1 mg Oral BID Naman Burciaga MD      calcium carbonate  500 mg Oral TID PRN Mortimer Bridges Lodics, CRNP      diphenhydrAMINE  25 mg Oral HS Isis Lama, CRNP      escitalopram  20 mg Oral Daily Isis Lama, JARED      fenofibrate  145 mg Oral Daily JARED Waters gabapentin  600 mg Oral 4x Daily Isis Lama, CRNP      haloperidol  2 mg Oral 4x Daily Dmitriy Ornelas MD      haloperidol  5 mg Oral Q6H PRN Lindsey Side Lodics, CRNP      hydrOXYzine HCL  25 mg Oral Q6H PRN Max 4/day Isis Lama, CRNP      hydrOXYzine HCL  50 mg Oral Q6H PRN Max 4/day Isis Lama, CRNP      lidocaine  1 patch Topical Daily Isis Lama, CRNP      LORazepam  1 mg Intramuscular Q6H PRN Dmitriy Ornelas MD      LORazepam  0 5 mg Oral 4x Daily Dmitriy Ornelas MD      magnesium hydroxide  30 mL Oral Daily PRN Lindsey Side Lodics, CRNP      nicotine  1 patch Transdermal Daily Tamica Reuben, CRNP      OLANZapine  2 5 mg Oral Q8H PRN Tamica Reuben, CRNP      OLANZapine  5 mg Oral Q3H PRN Tamica Reuben, CRNP      OLANZapine  7 5 mg Oral Q3H PRN Tamica Reuben, CRNP      OLANZapine  10 mg Intramuscular Q3H PRN Tamica Reuebn, CRNP      OLANZapine  5 mg Intramuscular Q3H PRN Tamica Reuben, CRNP      OLANZapine  10 mg Oral HS Lexis Myers MD      OLANZapine  15 mg Oral Daily Lexis Myers MD      Pramox-PE-Glycerin-Petrolatum  1 application Rectal 4x Daily PRN Isis Lama, CRNP      prazosin  1 mg Oral HS Tamica Reuben, CRNP      psyllium  1 packet Oral Daily JARED Viramontes        Patient Active Problem List    Diagnosis Date Noted    Ear problem, bilateral 04/12/2021    Tobacco abuse 08/17/2020    Mild intermittent asthma without complication 15/16/4180    [facility] 03/24/2020    Medical clearance for psychiatric admission 10/22/2019    Carbuncle 10/22/2019    Generalized abdominal pain 08/19/2019    Non-intractable vomiting 08/19/2019    Lower extremity weakness 10/14/2018    Slow transit constipation 12/27/2017    Deficiency of vitamin B 48/28/0995    [facility]complicated [facility] 10/26/2017    Suicidal behavior 10/26/2017    Schizoaffective disorder, bipolar type ([facility] )     LYNN (generalized anxiety disorder) 07/06/2017    Post-traumatic stress disorder, chronic 07/06/2017    Degenerative disc disease, lumbar 10/19/2016        Plan: Medications reviewed, benefits/risks discussed with patient, condition reviewed with treatment team  Routine monitoring will occur on unit, evaluation of effectiveness and side effects of medications  Will continue w current meds

## 2021-05-07 NOTE — NURSING NOTE
Nimo Castillo maintained on ongoing SAFE precaution without incident on this shift  She is partially visible with irritable edge  She attended 1/2 evening groups  She is persistent and demanding to have her meds earlier  Slight irritability noted,  Continue to be compliant with her meds without prompting  Denies any depression or anxiety  No overt delusion or a/t/v hallucination noted  Will continue to monitor

## 2021-05-08 PROCEDURE — 99232 SBSQ HOSP IP/OBS MODERATE 35: CPT | Performed by: PSYCHIATRY & NEUROLOGY

## 2021-05-08 RX ADMIN — DIPHENHYDRAMINE HCL 25 MG: 25 TABLET ORAL at 21:00

## 2021-05-08 RX ADMIN — PSYLLIUM HUSK 1 PACKET: 3.4 POWDER ORAL at 08:24

## 2021-05-08 RX ADMIN — OLANZAPINE 10 MG: 10 TABLET, FILM COATED ORAL at 21:00

## 2021-05-08 RX ADMIN — HALOPERIDOL 2 MG: 1 TABLET ORAL at 12:25

## 2021-05-08 RX ADMIN — GABAPENTIN 600 MG: 300 CAPSULE ORAL at 17:14

## 2021-05-08 RX ADMIN — GABAPENTIN 600 MG: 300 CAPSULE ORAL at 21:00

## 2021-05-08 RX ADMIN — HALOPERIDOL 2 MG: 1 TABLET ORAL at 21:00

## 2021-05-08 RX ADMIN — ALUMINUM HYDROXIDE, MAGNESIUM HYDROXIDE, AND SIMETHICONE 15 ML: 200; 200; 20 SUSPENSION ORAL at 16:46

## 2021-05-08 RX ADMIN — LORAZEPAM 0.5 MG: 0.5 TABLET ORAL at 17:14

## 2021-05-08 RX ADMIN — LORAZEPAM 0.5 MG: 0.5 TABLET ORAL at 21:00

## 2021-05-08 RX ADMIN — PHENYTOIN 1 MG: 125 SUSPENSION ORAL at 21:00

## 2021-05-08 RX ADMIN — ESCITALOPRAM OXALATE 20 MG: 10 TABLET ORAL at 08:27

## 2021-05-08 RX ADMIN — HALOPERIDOL 2 MG: 1 TABLET ORAL at 17:14

## 2021-05-08 RX ADMIN — LORAZEPAM 0.5 MG: 0.5 TABLET ORAL at 12:25

## 2021-05-08 RX ADMIN — LIDOCAINE 1 PATCH: 50 PATCH CUTANEOUS at 08:24

## 2021-05-08 RX ADMIN — GABAPENTIN 600 MG: 300 CAPSULE ORAL at 12:24

## 2021-05-08 RX ADMIN — BENZTROPINE MESYLATE 1 MG: 1 TABLET ORAL at 08:29

## 2021-05-08 RX ADMIN — BENZTROPINE MESYLATE 1 MG: 1 TABLET ORAL at 17:14

## 2021-05-08 RX ADMIN — HALOPERIDOL 2 MG: 1 TABLET ORAL at 08:30

## 2021-05-08 RX ADMIN — FENOFIBRATE 145 MG: 145 TABLET ORAL at 08:28

## 2021-05-08 RX ADMIN — LORAZEPAM 0.5 MG: 0.5 TABLET ORAL at 08:27

## 2021-05-08 RX ADMIN — GABAPENTIN 600 MG: 300 CAPSULE ORAL at 08:24

## 2021-05-08 RX ADMIN — OLANZAPINE 15 MG: 5 TABLET, FILM COATED ORAL at 08:24

## 2021-05-08 RX ADMIN — NICOTINE 1 PATCH: 21 PATCH, EXTENDED RELEASE TRANSDERMAL at 08:30

## 2021-05-08 NOTE — PROGRESS NOTES
Progress Note - Behavioral Health     Masoud Henderson 53 y o  female MRN: [id number]   Unit/Bed#: [facility] 362-33 Encounter: [id number]    Per Nursing: Nursing reports that patient spent the majority of yesterday in her room yelling and screaming  She was cursing and screaming and yelling very loudly from her room  She was medication and meal compliant  She needed multiple attempts at redirection  She did not require any PRN administrations  She denied psychiatric symptoms  Per Patient: Patient states that she is currently feeling better  She reports that yesterday, she woke up in her room, and heard her daughter crying  She isn't sure if it was a voice that she was hearing, but it caused her to feel very upset, thinking about her daughter in pain  She then started having a "head game in my head" and felt like she "didn't wanna live again " She states that she became upset and couldn't calm herself down  She states that she has bad reactions to Atarax, and it does not help her calm down  She states repeatedly that she is hopeful that Haldol being dosed 4x daily will be very effective at keeping her stable throughout the day  She perseverates about this  She states that she currently feels stable and does not have any thoughts of wanting to harm herself or others  She does not feel angry or agitated at this time  She does not currently hear any voices at this time  She states that today is definitely better than yesterday  She feels more calm today than she did yesterday and is hopeful that today will be a better day  Behavior over the last 24 hours: unchanged - episodes of yelling and screaming in her room yesterday  Sleep: normal  Appetite: normal  Medication side effects: No   ROS: no complaints, all other systems are negative  Any positives in the Comprehensive Review of Systems were noted in the HPI  All other Review of Systems were negative          Mental Status Evaluation:    Appearance:  wearing a robe, looks older than stated age   Behavior:  pleasant, cooperative, calm, guarded, no evidence of acute agitation at this time, superficially calm and pleasant   Speech:  normal rate and volume   Mood:  improved, calm   Affect:  labile   Thought Process:  goal directed, perseverative at times   Associations: intact associations   Thought Content:  Cheondoism preoccupation   Perceptual Disturbances: denies auditory hallucinations when asked, talks to self at times, denies when asked, but talks to self at times   Risk Potential: Suicidal ideation - None at present  Homicidal ideation - None at present  Potential for aggression - Not at present   Sensorium:  oriented to person, place and time/date   Memory:  recent and remote memory grossly intact   Consciousness:  alert and awake   Attention: attention span and concentration appear shorter than expected for age   Insight:  impaired   Judgment: impaired   Gait/Station: normal gait/station   Motor Activity: no abnormal movements     Vital signs in last 24 hours:  Vitals:    05/09/21 0700   BP:    Pulse:    Resp: 18   Temp: 97 6 °F (36 4 °C)         Laboratory results:   I have personally reviewed all pertinent laboratory/tests results    Most Recent Labs:   Lab Results   Component Value Date    WBC 6 80 04/09/2021    RBC 4 32 04/09/2021    HGB 13 0 04/09/2021    HCT 39 5 04/09/2021     04/09/2021    RDW 14 3 04/09/2021    NEUTROABS 7 10 (H) 03/16/2021    SODIUM 139 05/03/2021    K 4 4 05/03/2021     05/03/2021    CO2 30 05/03/2021    BUN 14 05/03/2021    CREATININE 0 61 05/03/2021    GLUC 94 05/03/2021    GLUF 94 05/03/2021    CALCIUM 9 8 05/03/2021    AST 25 05/03/2021    ALT 18 05/03/2021    ALKPHOS 56 05/03/2021    TP 7 2 05/03/2021    ALB 4 2 05/03/2021    TBILI 0 25 05/03/2021    CHOLESTEROL 300 (H) 03/31/2021    HDL 57 03/31/2021    TRIG 658 (H) 03/31/2021    LDLCALC  03/31/2021      Comment:      Calculated LDL invalid, triglycerides >400 mg/dl    NONHDLC 147 08/23/2020    AMMONIA 28 10/27/2017    KGO2KGPSHAMX 3 810 04/09/2021    FREET4 0 89 03/24/2016    PREGUR negative 03/16/2021    PREGSERUM Negative 10/21/2019    HCG <2 00 04/10/2014    RPR Non-Reactive 03/31/2021    HGBA1C 5 0 08/06/2019    EAG 97 08/06/2019       Progress Toward Goals: progressing    Assessment/Plan   Principal Problem:    Schizoaffective disorder, bipolar type (HCC)  Active Problems:    Post-traumatic stress disorder, chronic    Medical clearance for psychiatric admission    Mild intermittent asthma without complication    Ear problem, bilateral    Recommended Treatment:     Planned medication and treatment changes:     All current active medications have been reviewed  Encourage group therapy, milieu therapy and occupational therapy  Behavioral Health checks every 7 minutes  Continue current medications:  Current Facility-Administered Medications   Medication Dose Route Frequency Provider Last Rate    acetaminophen  650 mg Oral Q6H PRN Ghassan Lama, CRNP      acetaminophen  650 mg Oral Q4H PRN Ghassan Lama, CRNP      acetaminophen  975 mg Oral Q6H PRN Isis Lama, RENATANP      al mag oxide-diphenhydramine-lidocaine viscous  10 mL Swish & Swallow Q4H PRN Earlyne Rink, CRNP      albuterol  2 puff Inhalation Q6H PRN Isis Lama, CRNP      aluminum-magnesium hydroxide-simethicone  15 mL Oral Q4H PRN Jasmyn L Sowmya, CRNP      benztropine  1 mg Intramuscular Q4H PRN Max 6/day Isis Lama, CRNP      benztropine  1 mg Oral Q4H PRN Max 6/day Isis Lama, CRNP      benztropine  1 mg Oral BID Shelia Burr MD      calcium carbonate  500 mg Oral TID PRN Ghassan Lama, CRNP      diphenhydrAMINE  25 mg Oral HS Isis Lama, CRNP      escitalopram  20 mg Oral Daily Isis Lama, CRNP      fenofibrate  145 mg Oral Daily Isis Lama, CRNP      gabapentin  600 mg Oral 4x Daily Isis Lama, CRNP      haloperidol  2 mg Oral 4x Daily Shefali Stanley MD      haloperidol  5 mg Oral Q6H PRN Venancioseth Mcmahonoe Lodics, CRNP      hydrOXYzine HCL  25 mg Oral Q6H PRN Max 4/day Isis Lama, CRNP      hydrOXYzine HCL  50 mg Oral Q6H PRN Max 4/day Isis Porterics, CRNP      lidocaine  1 patch Topical Daily Isis Lama, CRNP      LORazepam  1 mg Intramuscular Q6H PRN Shefali Stanley MD      LORazepam  0 5 mg Oral 4x Daily Shefali Stanley MD      magnesium hydroxide  30 mL Oral Daily PRN Venancio Aloe Lodics, CRNP      nicotine  1 patch Transdermal Daily Elsworth Pee, CRNP      OLANZapine  2 5 mg Oral Q8H PRN Elsworth Pee, CRNP      OLANZapine  5 mg Oral Q3H PRN Elsworth Pee, CRNP      OLANZapine  7 5 mg Oral Q3H PRN Elsworth Pee, CRNP      OLANZapine  10 mg Intramuscular Q3H PRN Elsworth Pee, CRNP      OLANZapine  5 mg Intramuscular Q3H PRN Elsworth Pee, CRNP      OLANZapine  10 mg Oral HS Iqra Caal MD      OLANZapine  15 mg Oral Daily Luis Maldonado MD      Pramox-PE-Glycerin-Petrolatum  1 application Rectal 4x Daily PRN Isis Porterics, CRNP      prazosin  1 mg Oral HS Elsworth Pee, CRNP      psyllium  1 packet Oral Daily JARED Isbell             Plan:  1) Patient presents with a more calm demeanor and affect today, when compared with yesterday's prolonged behavioral outbursts, where patient screamed for hours throughout the day  Per staff, this appears to be her baseline behaviors  Patient reports that she now feels more stable and is hopeful she will continue to feel more calm with her current medication regimen, as she had expressed yesterday  She is not in any acute distress at this time    2) Continue all current medications as directed:    Cogentin 1 mg twice daily   Bendaryl 25 mg once daily at bedtime   Lexapro 20 mg once daily   Gabapentin 600 mg four times daily   Haldol 2 mg four times daily   Ativan 0 5 mg four times daily   Zyprexa 15 mg once daily in the morning and 10 mg once daily at bedtime   Prazosin 1 mg once daily at bedtime  3) Medical   All medical comorbidities are under the care of [facility]  4) Continue to work with Case Management to determine patient's disposition following discharge  Risks / Benefits of Treatment:    Risks, benefits, and possible side effects of medications explained to patient and patient verbalizes understanding and agreement for treatment  Counseling / Coordination of Care:    Patient's progress reviewed with nursing staff  Medications, treatment progress and treatment plan reviewed with patient      Elizabeth Wynne PA-C 05/09/21

## 2021-05-08 NOTE — PROGRESS NOTES
Quiet, cooperative and visible intermittently  Multiple loud outbursts at time  Redirected back to room for screaming  Patient compliant  No PRN's offered or requested  Med and meal compliant  Denies depression, anxiety, SI, HI and pain  Maintained on safe precautions without incident    Will continue to monitor progress in recovery program

## 2021-05-08 NOTE — PLAN OF CARE
Problem: Alteration in Thoughts and Perception  Goal: Attend and participate in unit activities, including therapeutic, recreational, and educational groups  Description: Interventions:  -Encourage Visitation and family involvement in care  Outcome: Progressing     Problem: Risk for Self Injury/Neglect  Goal: Refrain from harming self  Description: Interventions:  - Monitor patient closely, per order  - Develop a trusting relationship  - Supervise medication ingestion, monitor effects and side effects   Outcome: Progressing     Problem: Alteration in Thoughts and Perception  Goal: Refrain from acting on delusional thinking/internal stimuli  Description: Interventions:  - Monitor patient closely, per order   - Utilize least restrictive measures   - Set reasonable limits, give positive feedback for acceptable   - Administer medications as ordered and monitor of potential side effects  Outcome: Not Progressing     Problem: Ineffective Coping  Goal: Identifies ineffective coping skills  Outcome: Not Progressing

## 2021-05-09 PROCEDURE — 99232 SBSQ HOSP IP/OBS MODERATE 35: CPT | Performed by: PSYCHIATRY & NEUROLOGY

## 2021-05-09 RX ADMIN — NICOTINE 1 PATCH: 21 PATCH, EXTENDED RELEASE TRANSDERMAL at 08:20

## 2021-05-09 RX ADMIN — MAGNESIUM HYDROXIDE 30 ML: 400 SUSPENSION ORAL at 18:30

## 2021-05-09 RX ADMIN — PSYLLIUM HUSK 1 PACKET: 3.4 POWDER ORAL at 08:19

## 2021-05-09 RX ADMIN — OLANZAPINE 15 MG: 5 TABLET, FILM COATED ORAL at 08:19

## 2021-05-09 RX ADMIN — LORAZEPAM 0.5 MG: 0.5 TABLET ORAL at 21:52

## 2021-05-09 RX ADMIN — LIDOCAINE 1 PATCH: 50 PATCH CUTANEOUS at 08:19

## 2021-05-09 RX ADMIN — HALOPERIDOL 2 MG: 1 TABLET ORAL at 21:52

## 2021-05-09 RX ADMIN — GABAPENTIN 600 MG: 300 CAPSULE ORAL at 21:55

## 2021-05-09 RX ADMIN — GABAPENTIN 600 MG: 300 CAPSULE ORAL at 13:00

## 2021-05-09 RX ADMIN — DIPHENHYDRAMINE HCL 25 MG: 25 TABLET ORAL at 21:55

## 2021-05-09 RX ADMIN — FENOFIBRATE 145 MG: 145 TABLET ORAL at 08:20

## 2021-05-09 RX ADMIN — HALOPERIDOL 2 MG: 1 TABLET ORAL at 13:00

## 2021-05-09 RX ADMIN — ESCITALOPRAM OXALATE 20 MG: 10 TABLET ORAL at 08:20

## 2021-05-09 RX ADMIN — PHENYTOIN 1 MG: 125 SUSPENSION ORAL at 21:54

## 2021-05-09 RX ADMIN — OLANZAPINE 10 MG: 10 TABLET, FILM COATED ORAL at 21:55

## 2021-05-09 RX ADMIN — LORAZEPAM 0.5 MG: 0.5 TABLET ORAL at 08:20

## 2021-05-09 RX ADMIN — LORAZEPAM 0.5 MG: 0.5 TABLET ORAL at 17:18

## 2021-05-09 RX ADMIN — GABAPENTIN 600 MG: 300 CAPSULE ORAL at 17:18

## 2021-05-09 RX ADMIN — HALOPERIDOL 2 MG: 1 TABLET ORAL at 08:19

## 2021-05-09 RX ADMIN — LORAZEPAM 0.5 MG: 0.5 TABLET ORAL at 13:02

## 2021-05-09 RX ADMIN — BENZTROPINE MESYLATE 1 MG: 1 TABLET ORAL at 17:18

## 2021-05-09 RX ADMIN — HALOPERIDOL 2 MG: 1 TABLET ORAL at 17:18

## 2021-05-09 RX ADMIN — BENZTROPINE MESYLATE 1 MG: 1 TABLET ORAL at 08:20

## 2021-05-09 RX ADMIN — GABAPENTIN 600 MG: 300 CAPSULE ORAL at 08:20

## 2021-05-09 NOTE — NURSING NOTE
Patient is visible  There is less yelling than last night She appears entitled and aloof  She is visible: comes out often to use the phone  She is able to be redirected  Medication compliant and no med seeking this evening   She went to bed fairly early and had to be awakened for her 2100 medications

## 2021-05-09 NOTE — NURSING NOTE
Very irritable this shift, had to be reminded about yelling and cursing so loudly in her room  Med and meal compliant, no prn meds requested

## 2021-05-09 NOTE — PROGRESS NOTES
Quiet, cooperative and visible intermittently  Limited amount of yelling out  Redirectable and able to self regulate  No PRN's given  Med and meal compliant  Worked on doing laundry and rotating outfits  Reviewing med list   Talked on phone  Denies depression, anxiety, SI, HI and pain  Maintained on safe precautions without incident    Will continue to monitor progress in recovery program

## 2021-05-10 PROCEDURE — 99232 SBSQ HOSP IP/OBS MODERATE 35: CPT | Performed by: PSYCHIATRY & NEUROLOGY

## 2021-05-10 RX ADMIN — LORAZEPAM 0.5 MG: 0.5 TABLET ORAL at 21:21

## 2021-05-10 RX ADMIN — LORAZEPAM 0.5 MG: 0.5 TABLET ORAL at 12:21

## 2021-05-10 RX ADMIN — FENOFIBRATE 145 MG: 145 TABLET ORAL at 08:33

## 2021-05-10 RX ADMIN — NICOTINE 1 PATCH: 21 PATCH, EXTENDED RELEASE TRANSDERMAL at 08:34

## 2021-05-10 RX ADMIN — BENZTROPINE MESYLATE 1 MG: 1 TABLET ORAL at 08:33

## 2021-05-10 RX ADMIN — LORAZEPAM 0.5 MG: 0.5 TABLET ORAL at 17:24

## 2021-05-10 RX ADMIN — LIDOCAINE 1 PATCH: 50 PATCH CUTANEOUS at 08:34

## 2021-05-10 RX ADMIN — GABAPENTIN 600 MG: 300 CAPSULE ORAL at 17:24

## 2021-05-10 RX ADMIN — OLANZAPINE 10 MG: 10 TABLET, FILM COATED ORAL at 21:21

## 2021-05-10 RX ADMIN — DIPHENHYDRAMINE HCL 25 MG: 25 TABLET ORAL at 21:21

## 2021-05-10 RX ADMIN — PSYLLIUM HUSK 1 PACKET: 3.4 POWDER ORAL at 08:34

## 2021-05-10 RX ADMIN — PHENYTOIN 1 MG: 125 SUSPENSION ORAL at 21:20

## 2021-05-10 RX ADMIN — LORAZEPAM 0.5 MG: 0.5 TABLET ORAL at 08:31

## 2021-05-10 RX ADMIN — OLANZAPINE 2.5 MG: 5 TABLET, ORALLY DISINTEGRATING ORAL at 13:40

## 2021-05-10 RX ADMIN — HALOPERIDOL 2 MG: 1 TABLET ORAL at 08:32

## 2021-05-10 RX ADMIN — BENZTROPINE MESYLATE 1 MG: 1 TABLET ORAL at 17:24

## 2021-05-10 RX ADMIN — OLANZAPINE 15 MG: 5 TABLET, FILM COATED ORAL at 08:31

## 2021-05-10 RX ADMIN — HALOPERIDOL 2 MG: 1 TABLET ORAL at 21:21

## 2021-05-10 RX ADMIN — GABAPENTIN 600 MG: 300 CAPSULE ORAL at 08:31

## 2021-05-10 RX ADMIN — HALOPERIDOL 2 MG: 1 TABLET ORAL at 12:21

## 2021-05-10 RX ADMIN — ESCITALOPRAM OXALATE 20 MG: 10 TABLET ORAL at 08:31

## 2021-05-10 RX ADMIN — GABAPENTIN 600 MG: 300 CAPSULE ORAL at 12:21

## 2021-05-10 RX ADMIN — HALOPERIDOL 2 MG: 1 TABLET ORAL at 17:24

## 2021-05-10 RX ADMIN — GABAPENTIN 600 MG: 300 CAPSULE ORAL at 21:21

## 2021-05-10 NOTE — PROGRESS NOTES
53 y o  was seen today, on unit  According to the patient and nursing staff, the following is reported: nursing reporting patient is doing fairly well, although she was agitated over the weekend on 1occasion  Patient reporting improving mood and anxiety  Sleep and appetite are fair  Has been complaint with medications and no agitation witnessed  Medications reviewed, denies SI/HI, denies A/V acharya   Says that when she has auditory or visual hallucinations she becomes verbally agitated      Mental Status Evaluation:  Appearance:  Adequate hygiene and grooming and Good eye contact   Behavior:  calm, cooperative and friendly   Mood:  anxious   Affect: constricted   Speech: Normal rate and Normal volume   Thought Process:  Goal directed and coherent   Thought Content:  Does not verbalize delusional material   Perceptual Disturbances: Denies hallucinations and does not appear to be responding to internal stimuli   Risk Potential: No suicidal or homicidal ideation   Orientation:   Oriented x 3       Current Facility-Administered Medications   Medication Dose Route Frequency Provider Last Rate    acetaminophen  650 mg Oral Q6H PRN Joseph Lama, RENATANP      acetaminophen  650 mg Oral Q4H PRN Joseph Lama, RENATANP      acetaminophen  975 mg Oral Q6H PRN Isis Lama, JARED      al mag oxide-diphenhydramine-lidocaine viscous  10 mL Swish & Swallow Q4H PRN José Miguel Tiana, CRNP      albuterol  2 puff Inhalation Q6H PRN Isis Lama, RENATANP      aluminum-magnesium hydroxide-simethicone  15 mL Oral Q4H PRN Jasmyn Deng, RENATANP      benztropine  1 mg Intramuscular Q4H PRN Max 6/day Isis Lama, CRNP      benztropine  1 mg Oral Q4H PRN Max 6/day Isis Lama, JARED      benztropine  1 mg Oral BID Skip Trejo MD      calcium carbonate  500 mg Oral TID PRN Joseph Lama, JARED      diphenhydrAMINE  25 mg Oral HS JARED Jones      escitalopram  20 mg Oral Daily Joseph Sanchez Daina, CRNP      fenofibrate  145 mg Oral Daily Isis Lama, CRNP      gabapentin  600 mg Oral 4x Daily Isis Lama, CRNP      haloperidol  2 mg Oral 4x Daily Nerissa Sanchez MD      haloperidol  5 mg Oral Q6H PRN Kimberly Lama, CRNP      hydrOXYzine HCL  25 mg Oral Q6H PRN Max 4/day Isis Lama, CRNP      hydrOXYzine HCL  50 mg Oral Q6H PRN Max 4/day Isis Lama, CRNP      lidocaine  1 patch Topical Daily Isis Lama, CRNP      LORazepam  1 mg Intramuscular Q6H PRN Nerissa Sanchez MD      LORazepam  0 5 mg Oral 4x Daily Nerissa Sanchez MD      magnesium hydroxide  30 mL Oral Daily PRN Kimberly Lama, CRNP      nicotine  1 patch Transdermal Daily Liliam Lively, CRNP      OLANZapine  2 5 mg Oral Q8H PRN Liliam Lively, CRNP      OLANZapine  5 mg Oral Q3H PRN Liliam Lively, CRNP      OLANZapine  7 5 mg Oral Q3H PRN Liliam Lively, CRNP      OLANZapine  10 mg Intramuscular Q3H PRN Liliam Lively, CRNP      OLANZapine  5 mg Intramuscular Q3H PRN Liliam Lively, CRNP      OLANZapine  10 mg Oral HS Matteo Sanchez MD      OLANZapine  15 mg Oral Daily Matteo Sanchez MD      Pramox-PE-Glycerin-Petrolatum  1 application Rectal 4x Daily PRN Isis Lama, CRNP      prazosin  1 mg Oral HS Liliam Lively, CRNP      psyllium  1 packet Oral Daily Elo Spikes, CRNP        Patient Active Problem List    Diagnosis Date Noted    Ear problem, bilateral 04/12/2021    Tobacco abuse 08/17/2020    Mild intermittent asthma without complication 86/76/2591    Alcohol dependence with unspecified alcohol-induced disorder ([facility] [address] ) 03/24/2020    Medical clearance for psychiatric admission 10/22/2019    Carbuncle 10/22/2019    Generalized abdominal pain 08/19/2019    Non-intractable vomiting 08/19/2019    Lower extremity weakness 10/14/2018    Slow transit constipation 12/27/2017    Deficiency of vitamin B 53/24/6920    Uncomplicated alcohol dependence ([facility] [address] ) 10/26/2017    Suicidal behavior 10/26/2017    Schizoaffective disorder, bipolar type ([facility] [address] )     LYNN (generalized anxiety disorder) 07/06/2017    Post-traumatic stress disorder, chronic 07/06/2017    Degenerative disc disease, lumbar 10/19/2016        Plan: Medications reviewed, benefits/risks discussed with patient, condition reviewed with treatment team  Routine monitoring will occur on unit, evaluation of effectiveness and side effects of medications  Will continue w current meds

## 2021-05-10 NOTE — NURSING NOTE
Patient remains compliant with medication and meals  She does attend groups  She can be very demanding at times  She also gets into arguments with peers over something stupid   will continue to monitor and chart her progress

## 2021-05-10 NOTE — NURSING NOTE
Natalia Cousins maintained on ongoing SAFE precaution without incident on this shift   Laying in bed with eyes closed, breath even and unlabored  Q 7 minutes rounding implemented    Behavioral control   Will continue to monitor

## 2021-05-10 NOTE — PROGRESS NOTES
05/10/21 1036   Team Meeting   Meeting Type Daily Rounds   Initial Conference Date 05/10/21   Patient/Family Present   Patient Present No   Patient's Family Present No       Daily Rounds Documentation  Team Members Participating  MD Sushant Chu, Ember Almaraz [facility], [id number] [facility]  James Newell RN    Case reviewed  Periods of yelling and screaming, though otherwise controlled  Has been using the phone frequently  Entitled and demanding at times  5/5 groups

## 2021-05-10 NOTE — NURSING NOTE
Anthony Harley has been awake, alert, and visible intermittently out in the milieu  Pt ate 100% supper  No yelling out noted  Minimal interaction noted with peers  Compliant with scheduled meds when called  Attended and participated in recovery 101 group  No behavioral issues  Makes phone calls  Resting quietly in bed at present, arouses easily  Continue to monitor/assess for any changes

## 2021-05-10 NOTE — PLAN OF CARE
Problem: Nutrition/Hydration-ADULT  Goal: Nutrient/Hydration intake appropriate for improving, restoring or maintaining nutritional needs  Description: Monitor and assess patient's nutrition/hydration status for malnutrition  Collaborate with interdisciplinary team and initiate plan and interventions as ordered  Monitor patient's weight and dietary intake as ordered or per policy  Utilize nutrition screening tool and intervene as necessary  Determine patient's food preferences and provide high-protein, high-caloric foods as appropriate       INTERVENTIONS:  - Monitor oral intake, urinary output, labs, and treatment plans  - Assess nutrition and hydration status and recommend course of action  - Evaluate amount of meals eaten  - Assist patient with eating if necessary   - Allow adequate time for meals  - Recommend/ encourage appropriate diets, oral nutritional supplements, and vitamin/mineral supplements  - Order, calculate, and assess calorie counts as needed  - Recommend, monitor, and adjust tube feedings and TPN/PPN based on assessed needs  - Assess need for intravenous fluids  - Provide specific nutrition/hydration education as appropriate  - Include patient/family/caregiver in decisions related to nutrition  Outcome: Progressing     Problem: Alteration in Thoughts and Perception  Goal: Verbalize thoughts and feelings  Description: Interventions:  - Promote a nonjudgmental and trusting relationship with the patient through active listening and therapeutic communication  - Assess patient's level of functioning, behavior and potential for risk  - Engage patient in 1 on 1 interactions  - Encourage patient to express fears, feelings, frustrations, and discuss symptoms    - [place] patient to reality, help patient recognize reality-based thinking   - Administer medications as ordered and assess for potential side effects  - Provide the patient education related to the signs and symptoms of the illness and desired effects of prescribed medications  Outcome: Progressing  Goal: Refrain from acting on delusional thinking/internal stimuli  Description: Interventions:  - Monitor patient closely, per order   - Utilize least restrictive measures   - Set reasonable limits, give positive feedback for acceptable   - Administer medications as ordered and monitor of potential side effects  Outcome: Not Progressing  Goal: Agree to be compliant with medication regime, as prescribed and report medication side effects  Description: Interventions:  - Offer appropriate PRN medication and supervise ingestion; conduct AIMS, as needed   Outcome: Progressing  Goal: Attend and participate in unit activities, including therapeutic, recreational, and educational groups  Description: Interventions:  -Encourage Visitation and family involvement in care  Outcome: Progressing     Problem: Ineffective Coping  Goal: Demonstrates healthy coping skills  Outcome: Progressing  Goal: Participates in unit activities  Description: Interventions:  - Provide therapeutic environment   - Provide required programming   - Redirect inappropriate behaviors   Outcome: Progressing  Goal: Understands least restrictive measures  Description: Interventions:  - Utilize least restrictive behavior  Outcome: Progressing     Problem: Risk for Self Injury/Neglect  Goal: Verbalize thoughts and feelings  Description: Interventions:  - Assess and re-assess patient's lethality and potential for self-injury  - Engage patient in 1:1 interactions, daily, for a minimum of 15 minutes  - Encourage patient to express feelings, fears, frustrations, hopes  - Establish rapport/trust with patient   Outcome: Progressing  Goal: Attend and participate in unit activities, including therapeutic, recreational, and educational groups  Description: Interventions:  - Provide therapeutic and educational activities daily, encourage attendance and participation, and document same in the medical record  - Obtain collateral information, encourage visitation and family involvement in care   Outcome: Progressing     Problem: Risk for Self Injury/Neglect  Goal: Verbalize thoughts and feelings  Description: Interventions:  - Assess and re-assess patient's lethality and potential for self-injury  - Engage patient in 1:1 interactions, daily, for a minimum of 15 minutes  - Encourage patient to express feelings, fears, frustrations, hopes  - Establish rapport/trust with patient   Outcome: Progressing  Goal: Attend and participate in unit activities, including therapeutic, recreational, and educational groups  Description: Interventions:  - Provide therapeutic and educational activities daily, encourage attendance and participation, and document same in the medical record  - Obtain collateral information, encourage visitation and family involvement in care   Outcome: Progressing     Problem: Depression  Goal: Refrain from isolation  Description: Interventions:  - Develop a trusting relationship   - Encourage socialization   Outcome: Progressing  Goal: Refrain from self-neglect  Outcome: Progressing  Goal: Attend and participate in unit activities, including therapeutic, recreational, and educational groups  Description: Interventions:  - Provide therapeutic and educational activities daily, encourage attendance and participation, and document same in the medical record   Outcome: Progressing     Problem: Anxiety  Goal: Anxiety is at manageable level  Description: Interventions:  - Assess and monitor patient's anxiety level  - Monitor for signs and symptoms (heart palpitations, chest pain, shortness of breath, headaches, nausea, feeling jumpy, restlessness, irritable, apprehensive)  - Collaborate with interdisciplinary team and initiate plan and interventions as ordered    - [place] patient to unit/surroundings  - Explain treatment plan  - Encourage participation in care  - Encourage verbalization of concerns/fears  - Identify coping mechanisms  - Assist in developing anxiety-reducing skills  - Administer/offer alternative therapies  - Limit or eliminate stimulants  Outcome: Progressing     Problem: Risk for Violence/Aggression Toward Others  Goal: Verbalize thoughts and feelings  Description: Interventions:  - Assess and re-assess patient's level of risk, every waking shift  - Engage patient in 1:1 interactions, daily, for a minimum of 15 minutes   - Allow patient to express feelings and frustrations in a safe and non-threatening manner   - Establish rapport/trust with patient   Outcome: Progressing  Goal: Refrain from destructive acts on the environment or property  Outcome: Progressing  Goal: Control angry outbursts  Description: Interventions:  - Monitor patient closely, per order  - Ensure early verbal de-escalation  - Monitor prn medication needs  - Set reasonable/therapeutic limits, outline behavioral expectations, and consequences   - Provide a non-threatening milieu, utilizing the least restrictive interventions   Outcome: Progressing  Goal: Attend and participate in unit activities, including therapeutic, recreational, and educational groups  Description: Interventions:  - Provide therapeutic and educational activities daily, encourage attendance and participation, and document same in the medical record   Outcome: Progressing     Problem: Alteration in Orientation  Goal: Interact with staff daily  Description: Interventions:  - Assess and re-assess patient's level of orientation  - Engage patient in 1 on 1 interactions, daily, for a minimum of 15 minutes   - Establish rapport/trust with patient   Outcome: Progressing

## 2021-05-10 NOTE — PLAN OF CARE
Problem: Alteration in Thoughts and Perception  Goal: Treatment Goal: Gain control of psychotic behaviors/thinking, reduce/eliminate presenting symptoms and demonstrate improved reality functioning upon discharge  Outcome: Not Progressing  Goal: Verbalize thoughts and feelings  Description: Interventions:  - Promote a nonjudgmental and trusting relationship with the patient through active listening and therapeutic communication  - Assess patient's level of functioning, behavior and potential for risk  - Engage patient in 1 on 1 interactions  - Encourage patient to express fears, feelings, frustrations, and discuss symptoms    - [place] patient to reality, help patient recognize reality-based thinking   - Administer medications as ordered and assess for potential side effects  - Provide the patient education related to the signs and symptoms of the illness and desired effects of prescribed medications  Outcome: Progressing  Goal: Refrain from acting on delusional thinking/internal stimuli  Description: Interventions:  - Monitor patient closely, per order   - Utilize least restrictive measures   - Set reasonable limits, give positive feedback for acceptable   - Administer medications as ordered and monitor of potential side effects  Outcome: Not Progressing  Goal: Agree to be compliant with medication regime, as prescribed and report medication side effects  Description: Interventions:  - Offer appropriate PRN medication and supervise ingestion; conduct AIMS, as needed   Outcome: Progressing  Goal: Attend and participate in unit activities, including therapeutic, recreational, and educational groups  Description: Interventions:  -Encourage Visitation and family involvement in care  Outcome: Progressing  Goal: Recognize dysfunctional thoughts, communicate reality-based thoughts at the time of discharge  Description: Interventions:  - Provide medication and psycho-education to assist patient in compliance and developing insight into his/her illness   Outcome: Not Progressing  Goal: Complete daily ADLs, including personal hygiene independently, as able  Description: Interventions:  - Observe, teach, and assist patient with ADLS  - Monitor and promote a balance of rest/activity, with adequate nutrition and elimination   Outcome: Progressing     Problem: Ineffective Coping  Goal: Cooperates with admission process  Description: Interventions:   - Complete admission process  Outcome: Progressing  Goal: Identifies ineffective coping skills  Outcome: Not Progressing  Goal: Identifies healthy coping skills  Outcome: Not Progressing  Goal: Demonstrates healthy coping skills  Outcome: Not Progressing  Goal: Participates in unit activities  Description: Interventions:  - Provide therapeutic environment   - Provide required programming   - Redirect inappropriate behaviors   Outcome: Progressing  Goal: Patient/Family participate in treatment and DC plans  Description: Interventions:  - Provide therapeutic environment  Outcome: Progressing  Goal: Patient/Family verbalizes awareness of resources  Outcome: Progressing  Goal: Understands least restrictive measures  Description: Interventions:  - Utilize least restrictive behavior  Outcome: Progressing  Goal: Free from restraint events  Description: - Utilize least restrictive measures   - Provide behavioral interventions   - Redirect inappropriate behaviors   Outcome: Progressing     Problem: Risk for Self Injury/Neglect  Goal: Treatment Goal: Remain safe during length of stay, learn and adopt new coping skills, and be free of self-injurious ideation, impulses and acts at the time of discharge  Outcome: Progressing  Goal: Verbalize thoughts and feelings  Description: Interventions:  - Assess and re-assess patient's lethality and potential for self-injury  - Engage patient in 1:1 interactions, daily, for a minimum of 15 minutes  - Encourage patient to express feelings, fears, frustrations, hopes  - Establish rapport/trust with patient   Outcome: Progressing  Goal: Refrain from harming self  Description: Interventions:  - Monitor patient closely, per order  - Develop a trusting relationship  - Supervise medication ingestion, monitor effects and side effects   Outcome: Progressing  Goal: Attend and participate in unit activities, including therapeutic, recreational, and educational groups  Description: Interventions:  - Provide therapeutic and educational activities daily, encourage attendance and participation, and document same in the medical record  - Obtain collateral information, encourage visitation and family involvement in care   Outcome: Progressing  Goal: Recognize maladaptive responses and adopt new coping mechanisms  Outcome: Not Progressing  Goal: Complete daily ADLs, including personal hygiene independently, as able  Description: Interventions:  - Observe, teach, and assist patient with ADLS  - Monitor and promote a balance of rest/activity, with adequate nutrition and elimination  Outcome: Progressing     Problem: Depression  Goal: Treatment Goal: Demonstrate behavioral control of depressive symptoms, verbalize feelings of improved mood/affect, and adopt new coping skills prior to discharge  Outcome: Not Progressing  Goal: Verbalize thoughts and feelings  Description: Interventions:  - Assess and re-assess patient's level of risk   - Engage patient in 1:1 interactions, daily, for a minimum of 15 minutes   - Encourage patient to express feelings, fears, frustrations, hopes   Outcome: Progressing  Goal: Refrain from harming self  Description: Interventions:  - Monitor patient closely, per order   - Supervise medication ingestion, monitor effects and side effects   Outcome: Progressing  Goal: Refrain from isolation  Description: Interventions:  - Develop a trusting relationship   - Encourage socialization   Outcome: Progressing  Goal: Refrain from self-neglect  Outcome: Progressing  Goal: Attend and participate in unit activities, including therapeutic, recreational, and educational groups  Description: Interventions:  - Provide therapeutic and educational activities daily, encourage attendance and participation, and document same in the medical record   Outcome: Progressing  Goal: Complete daily ADLs, including personal hygiene independently, as able  Description: Interventions:  - Observe, teach, and assist patient with ADLS  -  Monitor and promote a balance of rest/activity, with adequate nutrition and elimination   Outcome: Progressing     Problem: Anxiety  Goal: Anxiety is at manageable level  Description: Interventions:  - Assess and monitor patient's anxiety level  - Monitor for signs and symptoms (heart palpitations, chest pain, shortness of breath, headaches, nausea, feeling jumpy, restlessness, irritable, apprehensive)  - Collaborate with interdisciplinary team and initiate plan and interventions as ordered    - [place] patient to unit/surroundings  - Explain treatment plan  - Encourage participation in care  - Encourage verbalization of concerns/fears  - Identify coping mechanisms  - Assist in developing anxiety-reducing skills  - Administer/offer alternative therapies  - Limit or eliminate stimulants  Outcome: Not Progressing     Problem: Risk for Violence/Aggression Toward Others  Goal: Treatment Goal: Refrain from acts of violence/aggression during length of stay, and demonstrate improved impulse control at the time of discharge  Outcome: Not Progressing  Goal: Verbalize thoughts and feelings  Description: Interventions:  - Assess and re-assess patient's level of risk, every waking shift  - Engage patient in 1:1 interactions, daily, for a minimum of 15 minutes   - Allow patient to express feelings and frustrations in a safe and non-threatening manner   - Establish rapport/trust with patient   Outcome: Progressing  Goal: Refrain from harming others  Outcome: Progressing  Goal: Refrain from destructive acts on the environment or property  Outcome: Not Progressing  Goal: Control angry outbursts  Description: Interventions:  - Monitor patient closely, per order  - Ensure early verbal de-escalation  - Monitor prn medication needs  - Set reasonable/therapeutic limits, outline behavioral expectations, and consequences   - Provide a non-threatening milieu, utilizing the least restrictive interventions   Outcome: Not Progressing  Goal: Attend and participate in unit activities, including therapeutic, recreational, and educational groups  Description: Interventions:  - Provide therapeutic and educational activities daily, encourage attendance and participation, and document same in the medical record   Outcome: Progressing  Goal: Identify appropriate positive anger management techniques  Description: Interventions:  - Offer anger management and coping skills groups   - Staff will provide positive feedback for appropriate anger control  Outcome: Not Progressing     Problem: Alteration in Orientation  Goal: Treatment Goal: Demonstrate a reduction of confusion and improved orientation to person, place, time and/or situation upon discharge, according to optimum baseline/ability  Outcome: Progressing  Goal: Interact with staff daily  Description: Interventions:  - Assess and re-assess patient's level of orientation  - Engage patient in 1 on 1 interactions, daily, for a minimum of 15 minutes   - Establish rapport/trust with patient   Outcome: Progressing  Goal: Express concerns related to confused thinking related to:  Description: Interventions:  - Encourage patient to express feelings, fears, frustrations, hopes  - Assign consistent caregivers   - [place]/re-orient patient as needed  - Allow comfort items, as appropriate  - Provide visual cues, signs, etc    Outcome: Not Progressing  Goal: Allow medical examinations, as recommended  Description: Interventions:  - Provide physical/neurological exams and/or referrals, per provider   Outcome: Progressing  Goal: Cooperate with recommended testing/procedures  Description: Interventions:  - Determine need for ancillary testing  - Observe for mental status changes  - Implement falls/precaution protocol   Outcome: Progressing  Goal: Attend and participate in unit activities, including therapeutic, recreational, and educational groups  Description: Interventions:  - Provide therapeutic and educational activities daily, encourage attendance and participation, and document same in the medical record   - Provide appropriate opportunities for reminiscence   - Provide a consistent daily routine   - Encourage family contact/visitation   Outcome: Progressing  Goal: Complete daily ADLs, including personal hygiene independently, as able  Description: Interventions:  - Observe, teach, and assist patient with ADLS  Outcome: Progressing     Problem: Individualized Interventions  Goal: Patient will verbalize appropriate use of telephone within 5 days  Description: Interventions:  - Treatment team to determine use of supervised phone privileges   Outcome: Progressing  Goal: Patient will verbalize need for hospitalization and will no longer attempt elopement within 5 days  Description: Interventions:  - Ongoing education to help patient understand need for hospitalization  Outcome: Progressing  Goal: Patient will recognize inappropriate behaviors and develop alternative behaviors within 5 days  Description: Interventions:  - Patient in collaboration with Treatment Team will develop a behavior management plan to help identify effective coping skills to deal with stressors  Outcome: Not Progressing

## 2021-05-11 PROCEDURE — 99232 SBSQ HOSP IP/OBS MODERATE 35: CPT | Performed by: PSYCHIATRY & NEUROLOGY

## 2021-05-11 RX ADMIN — ESCITALOPRAM OXALATE 20 MG: 10 TABLET ORAL at 08:33

## 2021-05-11 RX ADMIN — GABAPENTIN 600 MG: 300 CAPSULE ORAL at 12:51

## 2021-05-11 RX ADMIN — LIDOCAINE 1 PATCH: 50 PATCH CUTANEOUS at 08:34

## 2021-05-11 RX ADMIN — BENZTROPINE MESYLATE 1 MG: 1 TABLET ORAL at 17:03

## 2021-05-11 RX ADMIN — LORAZEPAM 0.5 MG: 0.5 TABLET ORAL at 08:32

## 2021-05-11 RX ADMIN — OLANZAPINE 15 MG: 5 TABLET, FILM COATED ORAL at 08:33

## 2021-05-11 RX ADMIN — PSYLLIUM HUSK 1 PACKET: 3.4 POWDER ORAL at 08:34

## 2021-05-11 RX ADMIN — BENZTROPINE MESYLATE 1 MG: 1 TABLET ORAL at 08:32

## 2021-05-11 RX ADMIN — MAGNESIUM HYDROXIDE 30 ML: 400 SUSPENSION ORAL at 17:24

## 2021-05-11 RX ADMIN — HALOPERIDOL 2 MG: 1 TABLET ORAL at 21:02

## 2021-05-11 RX ADMIN — HALOPERIDOL 2 MG: 1 TABLET ORAL at 17:04

## 2021-05-11 RX ADMIN — LORAZEPAM 0.5 MG: 0.5 TABLET ORAL at 21:02

## 2021-05-11 RX ADMIN — FENOFIBRATE 145 MG: 145 TABLET ORAL at 08:34

## 2021-05-11 RX ADMIN — LORAZEPAM 0.5 MG: 0.5 TABLET ORAL at 17:03

## 2021-05-11 RX ADMIN — PHENYTOIN 1 MG: 125 SUSPENSION ORAL at 21:02

## 2021-05-11 RX ADMIN — OLANZAPINE 10 MG: 10 TABLET, FILM COATED ORAL at 21:02

## 2021-05-11 RX ADMIN — GABAPENTIN 600 MG: 300 CAPSULE ORAL at 21:02

## 2021-05-11 RX ADMIN — GABAPENTIN 600 MG: 300 CAPSULE ORAL at 08:33

## 2021-05-11 RX ADMIN — HALOPERIDOL 2 MG: 1 TABLET ORAL at 08:33

## 2021-05-11 RX ADMIN — NICOTINE 1 PATCH: 21 PATCH, EXTENDED RELEASE TRANSDERMAL at 08:35

## 2021-05-11 RX ADMIN — HALOPERIDOL 2 MG: 1 TABLET ORAL at 12:52

## 2021-05-11 RX ADMIN — GABAPENTIN 600 MG: 300 CAPSULE ORAL at 17:03

## 2021-05-11 RX ADMIN — DIPHENHYDRAMINE HCL 25 MG: 25 TABLET ORAL at 21:02

## 2021-05-11 RX ADMIN — LORAZEPAM 0.5 MG: 0.5 TABLET ORAL at 12:51

## 2021-05-11 NOTE — PROGRESS NOTES
05/11/21 1043   Team Meeting   Meeting Type Daily Rounds   Initial Conference Date 05/11/21   Patient/Family Present   Patient Present No   Patient's Family Present No       Daily Rounds Documentation  Team Members Participating  Dr Nimo Chowdary, YUDY [place] [id number], [id number] [facility]  Ela Loya RN    Case reviewed  Attention seeking  Ankle pain  3/4 groups  Zyprexa PRN

## 2021-05-11 NOTE — PROGRESS NOTES
53 y o  was seen today, on unit  According to the patient and nursing staff, the following is reported: met with patient in treatment team meeting, nursing reporting patient is doing fairly well, no behaviors on unit  Has been attention seeking somatic complaints frequently  Has been attending groups  Patient reporting improving mood and anxiety  Sleep and appetite are fair  Has been complaint with medications and no agitation witnessed  Medications reviewed, denies SI/HI, denies A/V acharya, but hears voices from time to time  Likes meds the way they are, 4 times per day        Mental Status Evaluation:  Appearance:  Adequate hygiene and grooming and Good eye contact   Behavior:  calm, cooperative and friendly   Mood:  anxious   Affect: constricted   Speech: Normal rate and Normal volume   Thought Process:  Goal directed and coherent   Thought Content:  Does not verbalize delusional material   Perceptual Disturbances: Denies hallucinations and does not appear to be responding to internal stimuli   Risk Potential: No suicidal or homicidal ideation   Orientation:   Oriented x 3       Current Facility-Administered Medications   Medication Dose Route Frequency Provider Last Rate    acetaminophen  650 mg Oral Q6H PRN Julio Lama, CRNP      acetaminophen  650 mg Oral Q4H PRN Julio Lama, CRNP      acetaminophen  975 mg Oral Q6H PRN Isis Lama, RENATANP      al mag oxide-diphenhydramine-lidocaine viscous  10 mL Swish & Swallow Q4H PRN Alvester Fruits, CRNP      albuterol  2 puff Inhalation Q6H PRN Isis Lama, RENATANP      aluminum-magnesium hydroxide-simethicone  15 mL Oral Q4H PRN Jasmyn Deng, CRNP      benztropine  1 mg Intramuscular Q4H PRN Max 6/day Isis Lama, CRNP      benztropine  1 mg Oral Q4H PRN Max 6/day Isis Lama, CRNP      benztropine  1 mg Oral BID Elie Morley MD      calcium carbonate  500 mg Oral TID PRN JARED Briceno      diphenhydrAMINE 25 mg Oral HS Iiss Lama, CRNP      escitalopram  20 mg Oral Daily Isis Lama, CRNP      fenofibrate  145 mg Oral Daily Isis Lama, CRNP      gabapentin  600 mg Oral 4x Daily Isis Lama, CRNP      haloperidol  2 mg Oral 4x Daily Jenna Lind MD      haloperidol  5 mg Oral Q6H PRN Clay Porterics, CRNP      hydrOXYzine HCL  25 mg Oral Q6H PRN Max 4/day Isis Lama, CRNP      hydrOXYzine HCL  50 mg Oral Q6H PRN Max 4/day Isis Lama, CRNP      lidocaine  1 patch Topical Daily Isis Lama, CRNP      LORazepam  1 mg Intramuscular Q6H PRN Jenna Lind MD      LORazepam  0 5 mg Oral 4x Daily Jenna Lind MD      magnesium hydroxide  30 mL Oral Daily PRN Clay Lama, CRNP      nicotine  1 patch Transdermal Daily Raquel Cooler, CRNP      OLANZapine  2 5 mg Oral Q8H PRN Raquel Cooler, CRNP      OLANZapine  5 mg Oral Q3H PRN Raquel Cooler, CRNP      OLANZapine  7 5 mg Oral Q3H PRN Raquel Cooler, CRNP      OLANZapine  10 mg Intramuscular Q3H PRN Raquel Cooler, CRNP      OLANZapine  5 mg Intramuscular Q3H PRN Raquel Cooler, CRNP      OLANZapine  10 mg Oral HS Mary Pacheco MD      OLANZapine  15 mg Oral Daily Mary Pacheco MD      Pramox-PE-Glycerin-Petrolatum  1 application Rectal 4x Daily PRN Isis Lama, CRNP      prazosin  1 mg Oral HS Raquel Cooler, CRNP      psyllium  1 packet Oral Daily JARED Brian        Patient Active Problem List    Diagnosis Date Noted    Ear problem, bilateral 04/12/2021    Tobacco abuse 08/17/2020    Mild intermittent asthma without complication 47/48/8803    Alcohol dependence with unspecified alcohol-induced disorder ([facility] [address] ) 03/24/2020    Medical clearance for psychiatric admission 10/22/2019    Carbuncle 10/22/2019    Generalized abdominal pain 08/19/2019    Non-intractable vomiting 08/19/2019    Lower extremity weakness 10/14/2018    Slow transit constipation 12/27/2017  Deficiency of vitamin B 44/21/2454    Uncomplicated alcohol dependence ([facility] [address] ) 10/26/2017    Suicidal behavior 10/26/2017    Schizoaffective disorder, bipolar type ([facility] )     LYNN (generalized anxiety disorder) 07/06/2017    Post-traumatic stress disorder, chronic 07/06/2017    Degenerative disc disease, lumbar 10/19/2016        Plan: Medications reviewed, benefits/risks discussed with patient, condition reviewed with treatment team  Routine monitoring will occur on unit, evaluation of effectiveness and side effects of medications  Will continue w current meds

## 2021-05-11 NOTE — NURSING NOTE
Devon Bustamante has been awake, alert, and visible intermittently out in the milieu  No screaming or yelling noted this shift  Pt ate 50% supper  Pt spoke of feeling better and stated that she is "slowing down" and that her thoughts are clearer  Compliant with scheduled meds when called  Pt requested Milk of Magnesia for constipation and 30 cc po prn given at  (last recorded BM was 5/7/21)  Will monitor/assess for effectiveness  Denies any depression, anxiety, a/v hallucinations, and has not verbalized any delusions  Rests in room at intervals  Minimal interaction noted with peers  Attended and participated in evening wrap up group and had snack  Continue to monitor/assess for any changes

## 2021-05-11 NOTE — PROGRESS NOTES
05/11/21 1125   Team Meeting   Meeting Type Tx Team Meeting   Initial Conference Date 05/11/21   Next Conference Date 05/18/21   Team Members Present   Team Members Present Physician;   Physician Team Member Dr Lamont Morgan MD   Care Management Team Member Iveth HOPSON   Other (Discipline and Name) Forrest Castellanos [facility] (absent)   Patient/Family Present   Patient Present Yes   Patient's Family Present No       Patient presented for her treatment team this morning with a completed self assessment  Pt is cooperative, slightly anxious edge though less than yesterday  Pt reported her barrier (she identified as a goal) was to go to more groups  She reports coping by meditation, and walking  Her smart goal last week was to "stay inside myself" (not yell or externalize) and this week to try and "blend in with others " Patient reported no missed medications, listed them on her own, denied reaction to medications, reported medical issue with her eyes (has OP appointment tomorrow with eye doctor); charge nurse aware  Pt reports good self care by having good hygiene, doing programming, and caring for her physical health  It was discussed that a few housing options in [facility] territory may be appropriate for patient, who has been engaging in the program and stabilizing  Pt openly shared about her Ativan being QID and that it "works for me, I know part of it is behavioral but it helps me stay calm " No medication changes were made  Pt was validated for the work she's been doing and receptive to the idea that if she continues to progress, she'll be a priority for group home placement

## 2021-05-11 NOTE — NURSING NOTE
Patient is about the same She has her good moments and then again she has  Her odd moments  Where she will yell and scream to stop  Doing  Things to her  When she is  In her room  by herself and no one is near her

## 2021-05-11 NOTE — PLAN OF CARE
Problem: Alteration in Thoughts and Perception  Goal: Verbalize thoughts and feelings  Description: Interventions:  - Promote a nonjudgmental and trusting relationship with the patient through active listening and therapeutic communication  - Assess patient's level of functioning, behavior and potential for risk  - Engage patient in 1 on 1 interactions  - Encourage patient to express fears, feelings, frustrations, and discuss symptoms    - [place] patient to reality, help patient recognize reality-based thinking   - Administer medications as ordered and assess for potential side effects  - Provide the patient education related to the signs and symptoms of the illness and desired effects of prescribed medications  Outcome: Progressing  Goal: Refrain from acting on delusional thinking/internal stimuli  Description: Interventions:  - Monitor patient closely, per order   - Utilize least restrictive measures   - Set reasonable limits, give positive feedback for acceptable   - Administer medications as ordered and monitor of potential side effects  Outcome: Progressing  Goal: Agree to be compliant with medication regime, as prescribed and report medication side effects  Description: Interventions:  - Offer appropriate PRN medication and supervise ingestion; conduct AIMS, as needed   Outcome: Progressing  Goal: Attend and participate in unit activities, including therapeutic, recreational, and educational groups  Description: Interventions:  -Encourage Visitation and family involvement in care  Outcome: Progressing     Problem: Ineffective Coping  Goal: Demonstrates healthy coping skills  Outcome: Progressing  Goal: Participates in unit activities  Description: Interventions:  - Provide therapeutic environment   - Provide required programming   - Redirect inappropriate behaviors   Outcome: Progressing  Goal: Understands least restrictive measures  Description: Interventions:  - Utilize least restrictive behavior  Outcome: Progressing     Problem: Risk for Self Injury/Neglect  Goal: Verbalize thoughts and feelings  Description: Interventions:  - Assess and re-assess patient's lethality and potential for self-injury  - Engage patient in 1:1 interactions, daily, for a minimum of 15 minutes  - Encourage patient to express feelings, fears, frustrations, hopes  - Establish rapport/trust with patient   Outcome: Progressing  Goal: Attend and participate in unit activities, including therapeutic, recreational, and educational groups  Description: Interventions:  - Provide therapeutic and educational activities daily, encourage attendance and participation, and document same in the medical record  - Obtain collateral information, encourage visitation and family involvement in care   Outcome: Progressing     Problem: Depression  Goal: Verbalize thoughts and feelings  Description: Interventions:  - Assess and re-assess patient's level of risk   - Engage patient in 1:1 interactions, daily, for a minimum of 15 minutes   - Encourage patient to express feelings, fears, frustrations, hopes   Outcome: Progressing  Goal: Refrain from isolation  Description: Interventions:  - Develop a trusting relationship   - Encourage socialization   Outcome: Progressing  Goal: Attend and participate in unit activities, including therapeutic, recreational, and educational groups  Description: Interventions:  - Provide therapeutic and educational activities daily, encourage attendance and participation, and document same in the medical record   Outcome: Progressing     Problem: Anxiety  Goal: Anxiety is at manageable level  Description: Interventions:  - Assess and monitor patient's anxiety level  - Monitor for signs and symptoms (heart palpitations, chest pain, shortness of breath, headaches, nausea, feeling jumpy, restlessness, irritable, apprehensive)  - Collaborate with interdisciplinary team and initiate plan and interventions as ordered    - [place] patient to unit/surroundings  - Explain treatment plan  - Encourage participation in care  - Encourage verbalization of concerns/fears  - Identify coping mechanisms  - Assist in developing anxiety-reducing skills  - Administer/offer alternative therapies  - Limit or eliminate stimulants  Outcome: Progressing     Problem: Risk for Violence/Aggression Toward Others  Goal: Control angry outbursts  Description: Interventions:  - Monitor patient closely, per order  - Ensure early verbal de-escalation  - Monitor prn medication needs  - Set reasonable/therapeutic limits, outline behavioral expectations, and consequences   - Provide a non-threatening milieu, utilizing the least restrictive interventions   Outcome: Progressing  Goal: Attend and participate in unit activities, including therapeutic, recreational, and educational groups  Description: Interventions:  - Provide therapeutic and educational activities daily, encourage attendance and participation, and document same in the medical record   Outcome: Progressing

## 2021-05-11 NOTE — PROGRESS NOTES
53 y o  was seen today, on unit  According to the patient and nursing staff, the following is reported: nursing reporting patient is doing fairly well, no behaviors on unit  She has been very anxious and attention seeking and also reporting a variety of medical conditions  Patient reporting improving mood and anxiety  Sleep and appetite are fair  Has been complaint with medications and no agitation witnessed  Medications reviewed, denies SI/HI, denies A/V acharya at present time and indicates that these [id number] have been improving           Mental Status Evaluation:  Appearance:  Adequate hygiene and grooming and Good eye contact   Behavior:  cooperative and friendly   Mood:  anxious   Affect: constricted   Speech: Normal rate and Normal volume   Thought Process:  Goal directed and coherent   Thought Content:  Paranoid and mistrustful   Perceptual Disturbances: Denies hallucinations and does not appear to be responding to internal stimuli   Risk Potential: No suicidal or homicidal ideation   Orientation:   Oriented x 3       Current Facility-Administered Medications   Medication Dose Route Frequency Provider Last Rate    acetaminophen  650 mg Oral Q6H PRN Venancio Aloe Lodics, CRNP      acetaminophen  650 mg Oral Q4H PRN Venancio Aloe Lodics, CRNP      acetaminophen  975 mg Oral Q6H PRN JARED Jones      al mag oxide-diphenhydramine-lidocaine viscous  10 mL Swish & Swallow Q4H PRN Hesham Pearson, JARED      albuterol  2 puff Inhalation Q6H PRN Isis Lama, JARED      aluminum-magnesium hydroxide-simethicone  15 mL Oral Q4H PRN Jasmyn Deng, RENATANP      benztropine  1 mg Intramuscular Q4H PRN Max 6/day Isis Lama, CRNP      benztropine  1 mg Oral Q4H PRN Max 6/day Isis Lama, JARED      benztropine  1 mg Oral BID Ronni Stahl MD      calcium carbonate  500 mg Oral TID PRN Venancio Aloe Lodics, RENATANP      diphenhydrAMINE  25 mg Oral HS JARED Jones      escitalopram 20 mg Oral Daily Isis Lama, CRNP      fenofibrate  145 mg Oral Daily Isis Lama, CRNP      gabapentin  600 mg Oral 4x Daily Isis Lama, CRNP      haloperidol  2 mg Oral 4x Daily Chloe Patel MD      haloperidol  5 mg Oral Q6H PRN Andrei Lama, CRNP      hydrOXYzine HCL  25 mg Oral Q6H PRN Max 4/day Isis Lama, CRNP      hydrOXYzine HCL  50 mg Oral Q6H PRN Max 4/day Isis Lama, CRNP      lidocaine  1 patch Topical Daily Isis Lama, CRNP      LORazepam  1 mg Intramuscular Q6H PRN Chloe Patel MD      LORazepam  0 5 mg Oral 4x Daily Chloe Patel MD      magnesium hydroxide  30 mL Oral Daily PRN Andrei Lama, CRNP      nicotine  1 patch Transdermal Daily Martha Astrid, CRNP      OLANZapine  2 5 mg Oral Q8H PRN Martha Astrid, CRNP      OLANZapine  5 mg Oral Q3H PRN Martha Astrid, CRNP      OLANZapine  7 5 mg Oral Q3H PRN Martha Astrid, CRNP      OLANZapine  10 mg Intramuscular Q3H PRN Martha Astrid, CRNP      OLANZapine  5 mg Intramuscular Q3H PRN Martha Astrid, CRNP      OLANZapine  10 mg Oral HS Suraj Tejeda MD      OLANZapine  15 mg Oral Daily Suraj Tejeda MD      Pramox-PE-Glycerin-Petrolatum  1 application Rectal 4x Daily PRN Isis Lama, CRNP      prazosin  1 mg Oral HS Martha Astrid, CRNP      psyllium  1 packet Oral Daily JARED Vásquez        Patient Active Problem List    Diagnosis Date Noted    Ear problem, bilateral 04/12/2021    Tobacco abuse 08/17/2020    Mild intermittent asthma without complication 94/93/8767    Alcohol dependence with unspecified alcohol-induced disorder ([facility] [address] ) 03/24/2020    Medical clearance for psychiatric admission 10/22/2019    Carbuncle 10/22/2019    Generalized abdominal pain 08/19/2019    Non-intractable vomiting 08/19/2019    Lower extremity weakness 10/14/2018    Slow transit constipation 12/27/2017    Deficiency of vitamin B 70/72/9965    Uncomplicated alcohol dependence ([facility] ) 10/26/2017    Suicidal behavior 10/26/2017    Schizoaffective disorder, bipolar type ([facility] )     LYNN (generalized anxiety disorder) 07/06/2017    Post-traumatic stress disorder, chronic 07/06/2017    Degenerative disc disease, lumbar 10/19/2016        Plan: Medications reviewed, benefits/risks discussed with patient, condition reviewed with treatment team  Routine monitoring will occur on unit, evaluation of effectiveness and side effects of medications  Will continue w current meds

## 2021-05-12 PROCEDURE — 99232 SBSQ HOSP IP/OBS MODERATE 35: CPT | Performed by: PSYCHIATRY & NEUROLOGY

## 2021-05-12 RX ADMIN — LORAZEPAM 0.5 MG: 0.5 TABLET ORAL at 08:01

## 2021-05-12 RX ADMIN — PHENYTOIN 1 MG: 125 SUSPENSION ORAL at 21:01

## 2021-05-12 RX ADMIN — HALOPERIDOL 2 MG: 1 TABLET ORAL at 08:01

## 2021-05-12 RX ADMIN — HALOPERIDOL 2 MG: 1 TABLET ORAL at 11:32

## 2021-05-12 RX ADMIN — GABAPENTIN 600 MG: 300 CAPSULE ORAL at 08:01

## 2021-05-12 RX ADMIN — LIDOCAINE 1 PATCH: 50 PATCH CUTANEOUS at 08:00

## 2021-05-12 RX ADMIN — BENZTROPINE MESYLATE 1 MG: 1 TABLET ORAL at 08:01

## 2021-05-12 RX ADMIN — GABAPENTIN 600 MG: 300 CAPSULE ORAL at 17:30

## 2021-05-12 RX ADMIN — PSYLLIUM HUSK 1 PACKET: 3.4 POWDER ORAL at 08:02

## 2021-05-12 RX ADMIN — GABAPENTIN 600 MG: 300 CAPSULE ORAL at 11:31

## 2021-05-12 RX ADMIN — GABAPENTIN 600 MG: 300 CAPSULE ORAL at 21:01

## 2021-05-12 RX ADMIN — HALOPERIDOL 2 MG: 1 TABLET ORAL at 17:30

## 2021-05-12 RX ADMIN — OLANZAPINE 10 MG: 10 TABLET, FILM COATED ORAL at 21:01

## 2021-05-12 RX ADMIN — LORAZEPAM 0.5 MG: 0.5 TABLET ORAL at 11:31

## 2021-05-12 RX ADMIN — NICOTINE 1 PATCH: 21 PATCH, EXTENDED RELEASE TRANSDERMAL at 08:00

## 2021-05-12 RX ADMIN — LORAZEPAM 0.5 MG: 0.5 TABLET ORAL at 21:01

## 2021-05-12 RX ADMIN — HALOPERIDOL 2 MG: 1 TABLET ORAL at 21:01

## 2021-05-12 RX ADMIN — FENOFIBRATE 145 MG: 145 TABLET ORAL at 08:03

## 2021-05-12 RX ADMIN — ALUMINUM HYDROXIDE, MAGNESIUM HYDROXIDE, AND SIMETHICONE 15 ML: 200; 200; 20 SUSPENSION ORAL at 19:32

## 2021-05-12 RX ADMIN — BENZTROPINE MESYLATE 1 MG: 1 TABLET ORAL at 17:30

## 2021-05-12 RX ADMIN — DIPHENHYDRAMINE HCL 25 MG: 25 TABLET ORAL at 21:01

## 2021-05-12 RX ADMIN — OLANZAPINE 15 MG: 5 TABLET, FILM COATED ORAL at 08:00

## 2021-05-12 RX ADMIN — ESCITALOPRAM OXALATE 20 MG: 10 TABLET ORAL at 08:03

## 2021-05-12 RX ADMIN — LORAZEPAM 0.5 MG: 0.5 TABLET ORAL at 17:30

## 2021-05-12 NOTE — PROGRESS NOTES
53 y o  was seen today, on unit  According to the patient and nursing staff, the following is reported: nursing reporting patient is doing fairly well, no behaviors on unit  Reporting [id number] are slowing down  No recent screaming or yelling, has noticed correlation between voices and episodes of verbal agitation  Patient reporting improving mood and anxiety  Sleep and appetite are fair  Has been complaint with medications and no agitation witnessed  Medications reviewed, denies SI/HI, denies A/V acharya         Mental Status Evaluation:  Appearance:  Adequate hygiene and grooming and Good eye contact   Behavior:  cooperative and friendly   Mood:  dysphoric   Affect: constricted   Speech: Normal rate and Normal volume   Thought Process:  Goal directed and coherent   Thought Content:  Does not verbalize delusional material   Perceptual Disturbances: Denies hallucinations and does not appear to be responding to internal stimuli   Risk Potential: No suicidal or homicidal ideation   Orientation:   Oriented x 3       Current Facility-Administered Medications   Medication Dose Route Frequency Provider Last Rate    acetaminophen  650 mg Oral Q6H PRN Ozella Bastrop Germanics, CRNP      acetaminophen  650 mg Oral Q4H PRN Ozella Bartolo Porterics, CRNP      acetaminophen  975 mg Oral Q6H PRN Isis Lama, JARED      al mag oxide-diphenhydramine-lidocaine viscous  10 mL Swish & Swallow Q4H PRN Birder Mcardle, CRNP      albuterol  2 puff Inhalation Q6H PRN Isis Lama, RENATANP      aluminum-magnesium hydroxide-simethicone  15 mL Oral Q4H PRN Jasmyn Deng, CRNP      benztropine  1 mg Intramuscular Q4H PRN Max 6/day Isis Lama, CRNP      benztropine  1 mg Oral Q4H PRN Max 6/day Isis Lama, RENATANP      benztropine  1 mg Oral BID Freddy Perez MD      calcium carbonate  500 mg Oral TID PRN Thierno Lama, RENATANP      diphenhydrAMINE  25 mg Oral HS JARED Jones      escitalopram  20 mg Oral Daily Isis Lama, CRNP      fenofibrate  145 mg Oral Daily Isis Lama, CRNP      gabapentin  600 mg Oral 4x Daily Isis Lama, CRNP      haloperidol  2 mg Oral 4x Daily Lucille Maher MD      haloperidol  5 mg Oral Q6H PRN Minh Lama, CRNP      hydrOXYzine HCL  25 mg Oral Q6H PRN Max 4/day Isis Lama, CRNP      hydrOXYzine HCL  50 mg Oral Q6H PRN Max 4/day Isis Lama, CRNP      lidocaine  1 patch Topical Daily Isis Lama, CRNP      LORazepam  1 mg Intramuscular Q6H PRN Lucille Maher MD      LORazepam  0 5 mg Oral 4x Daily Lucille Maher MD      magnesium hydroxide  30 mL Oral Daily PRN Minh Lama, CRNP      nicotine  1 patch Transdermal Daily Harl Dopp, CRNP      OLANZapine  2 5 mg Oral Q8H PRN Harl Dopp, CRNP      OLANZapine  5 mg Oral Q3H PRN Harl Dopp, CRNP      OLANZapine  7 5 mg Oral Q3H PRN Harl Dopp, CRNP      OLANZapine  10 mg Intramuscular Q3H PRN Harl Dopp, CRNP      OLANZapine  5 mg Intramuscular Q3H PRN Harl Dopp, CRNP      OLANZapine  10 mg Oral HS Jeremiah Davenport MD      OLANZapine  15 mg Oral Daily Jeremiah Davenport MD      Pramox-PE-Glycerin-Petrolatum  1 application Rectal 4x Daily PRN Isis Lama, CRNP      prazosin  1 mg Oral HS Harl Dopp, CRNP      psyllium  1 packet Oral Daily JARED Jiménez        Patient Active Problem List    Diagnosis Date Noted    Ear problem, bilateral 04/12/2021    Tobacco abuse 08/17/2020    Mild intermittent asthma without complication 76/26/2905    Alcohol dependence with unspecified alcohol-induced disorder ([facility] [address] ) 03/24/2020    Medical clearance for psychiatric admission 10/22/2019    Carbuncle 10/22/2019    Generalized abdominal pain 08/19/2019    Non-intractable vomiting 08/19/2019    Lower extremity weakness 10/14/2018    Slow transit constipation 12/27/2017    Deficiency of vitamin B 96/96/6368    Uncomplicated alcohol dependence ([facility] ) 10/26/2017    Suicidal behavior 10/26/2017    Schizoaffective disorder, bipolar type ([facility] )     LYNN (generalized anxiety disorder) 07/06/2017    Post-traumatic stress disorder, chronic 07/06/2017    Degenerative disc disease, lumbar 10/19/2016        Plan: Medications reviewed, benefits/risks discussed with patient, condition reviewed with treatment team  Routine monitoring will occur on unit, evaluation of effectiveness and side effects of medications  Will continue w current meds

## 2021-05-12 NOTE — NURSING NOTE
Patient remains compliant with medication and meals She does attend groups  Patient will  sometime go in her room and yell for no reason She is usually hallucinating  About something    Will continue to monitor and chart any progress

## 2021-05-12 NOTE — PROGRESS NOTES
05/12/21 0924   Team Meeting   Meeting Type Daily Rounds   Initial Conference Date 05/12/21   Patient/Family Present   Patient Present No   Patient's Family Present No       Daily Rounds Documentation  Team Members Participating  MD Nona Kowalski, RN  Kami Weaver, LSW  [place] Chen, LSW  James Damon, ANNALISE    Case reviewed  4/5 groups  Optical appointment today at [address] [place] [address] in [address]  Cooperative, medication compliant, controlled

## 2021-05-12 NOTE — PLAN OF CARE
Problem: Nutrition/Hydration-ADULT  Goal: Nutrient/Hydration intake appropriate for improving, restoring or maintaining nutritional needs  Description: Monitor and assess patient's nutrition/hydration status for malnutrition  Collaborate with interdisciplinary team and initiate plan and interventions as ordered  Monitor patient's weight and dietary intake as ordered or per policy  Utilize nutrition screening tool and intervene as necessary  Determine patient's food preferences and provide high-protein, high-caloric foods as appropriate       INTERVENTIONS:  - Monitor oral intake, urinary output, labs, and treatment plans  - Assess nutrition and hydration status and recommend course of action  - Evaluate amount of meals eaten  - Assist patient with eating if necessary   - Allow adequate time for meals  - Recommend/ encourage appropriate diets, oral nutritional supplements, and vitamin/mineral supplements  - Order, calculate, and assess calorie counts as needed  - Recommend, monitor, and adjust tube feedings and TPN/PPN based on assessed needs  - Assess need for intravenous fluids  - Provide specific nutrition/hydration education as appropriate  - Include patient/family/caregiver in decisions related to nutrition  Outcome: Progressing     Problem: Alteration in Thoughts and Perception  Goal: Verbalize thoughts and feelings  Description: Interventions:  - Promote a nonjudgmental and trusting relationship with the patient through active listening and therapeutic communication  - Assess patient's level of functioning, behavior and potential for risk  - Engage patient in 1 on 1 interactions  - Encourage patient to express fears, feelings, frustrations, and discuss symptoms    - [place] patient to reality, help patient recognize reality-based thinking   - Administer medications as ordered and assess for potential side effects  - Provide the patient education related to the signs and symptoms of the illness and desired effects of prescribed medications  Outcome: Progressing  Goal: Refrain from acting on delusional thinking/internal stimuli  Description: Interventions:  - Monitor patient closely, per order   - Utilize least restrictive measures   - Set reasonable limits, give positive feedback for acceptable   - Administer medications as ordered and monitor of potential side effects  Outcome: Progressing  Goal: Agree to be compliant with medication regime, as prescribed and report medication side effects  Description: Interventions:  - Offer appropriate PRN medication and supervise ingestion; conduct AIMS, as needed   Outcome: Progressing  Goal: Attend and participate in unit activities, including therapeutic, recreational, and educational groups  Description: Interventions:  -Encourage Visitation and family involvement in care  Outcome: Progressing     Problem: Ineffective Coping  Goal: Demonstrates healthy coping skills  Outcome: Progressing  Goal: Participates in unit activities  Description: Interventions:  - Provide therapeutic environment   - Provide required programming   - Redirect inappropriate behaviors   Outcome: Progressing  Goal: Understands least restrictive measures  Description: Interventions:  - Utilize least restrictive behavior  Outcome: Progressing     Problem: Risk for Self Injury/Neglect  Goal: Verbalize thoughts and feelings  Description: Interventions:  - Assess and re-assess patient's lethality and potential for self-injury  - Engage patient in 1:1 interactions, daily, for a minimum of 15 minutes  - Encourage patient to express feelings, fears, frustrations, hopes  - Establish rapport/trust with patient   Outcome: Progressing  Goal: Attend and participate in unit activities, including therapeutic, recreational, and educational groups  Description: Interventions:  - Provide therapeutic and educational activities daily, encourage attendance and participation, and document same in the medical record  - Obtain collateral information, encourage visitation and family involvement in care   Outcome: Progressing     Problem: Depression  Goal: Verbalize thoughts and feelings  Description: Interventions:  - Assess and re-assess patient's level of risk   - Engage patient in 1:1 interactions, daily, for a minimum of 15 minutes   - Encourage patient to express feelings, fears, frustrations, hopes   Outcome: Progressing  Goal: Refrain from harming self  Description: Interventions:  - Monitor patient closely, per order   - Supervise medication ingestion, monitor effects and side effects   Outcome: Progressing  Goal: Refrain from isolation  Description: Interventions:  - Develop a trusting relationship   - Encourage socialization   Outcome: Progressing  Goal: Refrain from self-neglect  Outcome: Progressing  Goal: Attend and participate in unit activities, including therapeutic, recreational, and educational groups  Description: Interventions:  - Provide therapeutic and educational activities daily, encourage attendance and participation, and document same in the medical record   Outcome: Progressing     Problem: Anxiety  Goal: Anxiety is at manageable level  Description: Interventions:  - Assess and monitor patient's anxiety level  - Monitor for signs and symptoms (heart palpitations, chest pain, shortness of breath, headaches, nausea, feeling jumpy, restlessness, irritable, apprehensive)  - Collaborate with interdisciplinary team and initiate plan and interventions as ordered    - [place] patient to unit/surroundings  - Explain treatment plan  - Encourage participation in care  - Encourage verbalization of concerns/fears  - Identify coping mechanisms  - Assist in developing anxiety-reducing skills  - Administer/offer alternative therapies  - Limit or eliminate stimulants  Outcome: Progressing     Problem: Risk for Violence/Aggression Toward Others  Goal: Verbalize thoughts and feelings  Description: Interventions:  - Assess and re-assess patient's level of risk, every waking shift  - Engage patient in 1:1 interactions, daily, for a minimum of 15 minutes   - Allow patient to express feelings and frustrations in a safe and non-threatening manner   - Establish rapport/trust with patient   Outcome: Progressing  Goal: Control angry outbursts  Description: Interventions:  - Monitor patient closely, per order  - Ensure early verbal de-escalation  - Monitor prn medication needs  - Set reasonable/therapeutic limits, outline behavioral expectations, and consequences   - Provide a non-threatening milieu, utilizing the least restrictive interventions   Outcome: Progressing  Goal: Attend and participate in unit activities, including therapeutic, recreational, and educational groups  Description: Interventions:  - Provide therapeutic and educational activities daily, encourage attendance and participation, and document same in the medical record   Outcome: Progressing     Problem: Alteration in Orientation  Goal: Interact with staff daily  Description: Interventions:  - Assess and re-assess patient's level of orientation  - Engage patient in 1 on 1 interactions, daily, for a minimum of 15 minutes   - Establish rapport/trust with patient   Outcome: Progressing

## 2021-05-12 NOTE — NURSING NOTE
Miriam Pandey has been awake, alert, and visible intermittently out in the milieu  Pt ate 100% supper  Pt yelled out from room x 2 but easily redirectable and calmed down  Pt has irritable edge at times  Compliant with all scheduled meds when called  Minimal interaction noted with peers  Pt requested prn Mylanta for "indigestion "  Mylanta 15 cc po prn dose given at 1932 and effective  Pt stated that she believes she is being hypnotized by people and that they are making her do things  Remains delusional   Pt did not attend any evening groups  At 2125 pt vomited large amount undigested food  Skin warm and dry, color good  ,88, pulse 111, resp 20, temp 98  Pt stated that she felt better after she vomited  Pt then stated, "I have burning under my skin on my feet  My sister had bugs but I didn't get them  Can you get parasites under your skin?" Pt requested ginger ale and given  Pt instructed to speak to her doctor tomorrow about this  No further vomiting noted  Pt had evening snack prior to vomiting  Continue to monitor/assess for any changes
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
## 2021-05-12 NOTE — PLAN OF CARE
Problem: Nutrition/Hydration-ADULT  Goal: Nutrient/Hydration intake appropriate for improving, restoring or maintaining nutritional needs  Description: Monitor and assess patient's nutrition/hydration status for malnutrition  Collaborate with interdisciplinary team and initiate plan and interventions as ordered  Monitor patient's weight and dietary intake as ordered or per policy  Utilize nutrition screening tool and intervene as necessary  Determine patient's food preferences and provide high-protein, high-caloric foods as appropriate       INTERVENTIONS:  - Monitor oral intake, urinary output, labs, and treatment plans  - Assess nutrition and hydration status and recommend course of action  - Evaluate amount of meals eaten  - Assist patient with eating if necessary   - Allow adequate time for meals  - Recommend/ encourage appropriate diets, oral nutritional supplements, and vitamin/mineral supplements  - Order, calculate, and assess calorie counts as needed  - Recommend, monitor, and adjust tube feedings and TPN/PPN based on assessed needs  - Assess need for intravenous fluids  - Provide specific nutrition/hydration education as appropriate  - Include patient/family/caregiver in decisions related to nutrition  5/12/2021 1121 by Robbi Flores LPN  Outcome: Progressing  5/12/2021 1119 by Robbi Flores LPN  Outcome: Progressing

## 2021-05-13 PROCEDURE — 99232 SBSQ HOSP IP/OBS MODERATE 35: CPT | Performed by: PSYCHIATRY & NEUROLOGY

## 2021-05-13 RX ADMIN — LORAZEPAM 0.5 MG: 0.5 TABLET ORAL at 21:04

## 2021-05-13 RX ADMIN — LORAZEPAM 0.5 MG: 0.5 TABLET ORAL at 17:38

## 2021-05-13 RX ADMIN — GABAPENTIN 600 MG: 300 CAPSULE ORAL at 08:08

## 2021-05-13 RX ADMIN — GABAPENTIN 600 MG: 300 CAPSULE ORAL at 11:03

## 2021-05-13 RX ADMIN — HALOPERIDOL 2 MG: 1 TABLET ORAL at 17:38

## 2021-05-13 RX ADMIN — FENOFIBRATE 145 MG: 145 TABLET ORAL at 08:09

## 2021-05-13 RX ADMIN — GABAPENTIN 600 MG: 300 CAPSULE ORAL at 17:38

## 2021-05-13 RX ADMIN — PSYLLIUM HUSK 1 PACKET: 3.4 POWDER ORAL at 08:07

## 2021-05-13 RX ADMIN — DIPHENHYDRAMINE HCL 25 MG: 25 TABLET ORAL at 21:04

## 2021-05-13 RX ADMIN — OLANZAPINE 10 MG: 10 TABLET, FILM COATED ORAL at 21:04

## 2021-05-13 RX ADMIN — HALOPERIDOL 2 MG: 1 TABLET ORAL at 21:04

## 2021-05-13 RX ADMIN — ESCITALOPRAM OXALATE 20 MG: 10 TABLET ORAL at 08:07

## 2021-05-13 RX ADMIN — HALOPERIDOL 2 MG: 1 TABLET ORAL at 11:03

## 2021-05-13 RX ADMIN — BENZTROPINE MESYLATE 1 MG: 1 TABLET ORAL at 08:09

## 2021-05-13 RX ADMIN — ALUMINUM HYDROXIDE, MAGNESIUM HYDROXIDE, AND SIMETHICONE 15 ML: 200; 200; 20 SUSPENSION ORAL at 14:40

## 2021-05-13 RX ADMIN — NICOTINE 1 PATCH: 21 PATCH, EXTENDED RELEASE TRANSDERMAL at 08:13

## 2021-05-13 RX ADMIN — LORAZEPAM 0.5 MG: 0.5 TABLET ORAL at 11:03

## 2021-05-13 RX ADMIN — OLANZAPINE 15 MG: 5 TABLET, FILM COATED ORAL at 08:08

## 2021-05-13 RX ADMIN — LIDOCAINE 1 PATCH: 50 PATCH CUTANEOUS at 09:00

## 2021-05-13 RX ADMIN — PHENYTOIN 1 MG: 125 SUSPENSION ORAL at 21:04

## 2021-05-13 RX ADMIN — HALOPERIDOL 2 MG: 1 TABLET ORAL at 08:08

## 2021-05-13 RX ADMIN — GABAPENTIN 600 MG: 300 CAPSULE ORAL at 21:04

## 2021-05-13 RX ADMIN — BENZTROPINE MESYLATE 1 MG: 1 TABLET ORAL at 17:38

## 2021-05-13 RX ADMIN — LORAZEPAM 0.5 MG: 0.5 TABLET ORAL at 08:09

## 2021-05-13 NOTE — NURSING NOTE
Miriam Mews maintained on ongoing SAFE precaution without incident on this shift   Laying in bed with eyes closed, breath even and unlabored    Q 7 minutes rounding implemented   Behavioral control no outburst   Will continue to monitor
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
## 2021-05-13 NOTE — NURSING NOTE
Patient is compliant with medication and meals  Patient continues to be dilutional   Some time  At those times she yells on top of her lungs and  Says things that do not make any sense    She does attend groups most of the time where she can usually keep control of her behavior

## 2021-05-13 NOTE — PROGRESS NOTES
Psychiatry Progress Note [facility] 53 y o  female MRN: [id number]  Unit/Bed#: [facility] 109-01 Encounter: [id number]  Code Status: Level 1 - Full Code    PCP: Garland Gan DO    Date of Admission:  4/7/2021 1435   Date of Service:  05/13/21    Patient Active Problem List   Diagnosis    Schizoaffective disorder, bipolar type ([facility] )    Uncomplicated alcohol dependence ([facility] )    Suicidal behavior    LYNN (generalized anxiety disorder)    Slow transit constipation    Deficiency of vitamin B    Degenerative disc disease, lumbar    Post-traumatic stress disorder, chronic    Lower extremity weakness    Generalized abdominal pain    Non-intractable vomiting    Medical clearance for psychiatric admission    Carbuncle    Alcohol dependence with unspecified alcohol-induced disorder ([facility] )    Mild intermittent asthma without complication    Tobacco abuse    Ear problem, bilateral     Review of systems:      Unremarkable but threw up last night, no complaints today  Diagnosis:         Schizoaffective bipolar, alcohol use disorder episodic in early remission in controlled environment  ,Assessment   Overall Status:        Occasionally yells and screams at times and is agitated but redirectable   Certification Statement: The patient will continue to require additional inpatient hospital stay due to ongoing mood swings paranoia   Acceptance by patient:  accepting    Hopefulness in recovery:  Living in a group home   Understanding of medications: has good understanding   Involved in reintegration process:  Adjusting to the unit   Trusting in relationship with psychiatrist:  trusting   Justification for dual anti-psychotics:  Not applicable   Side effects from treatment: none    Medication changes     Patient agreeing to go back on [place] instead of [place] beginning May 18th his last [place] was given on 02/23/2021 as per the act team as 819 mg  Non-pharmacological treatments   Continue with individual, group, milieu and occupational therapy using recovery principles and psycho-education about accepting illness and the need for treatment  Safety   Safety and communication plan established to target dynamic risk factors discussed above  Discharge Plan     most likely to a group home with the act team    Interval Progress      Patient continues to reportedly yell and scream but less often and is still med seeking at times  She is still claims to hear voices which he cannot explain  It she continues to refer them as psychotic episodes  She understands that she needs to practice more positive coping skills rather than relying on p r n  medications all the time  Continues to believe that staff is doing hypnosis on her which has not changed  Staff reports no prns in a few days       Sleep:          Good   appetite:       good   compliance with medications :   good   Side  Effects: And reported   significant events: All screams and yells in response to voices and appearing paranoid and delusional but more redirectable   group attendance:      Attending most of the groups    Mental Status Exam  Appearance: age appropriate, casually dressed, adequate grooming, looks older than stated age, underweight     Behavior: normal, pleasant, cooperative, appears anxious,  more friendly today and not confrontational    Speech: fluent, coherent, hypertalkative,   Circumstantial pressured    Mood: depressed, anxious, labile, euphoric  Affect: inappropriate, labile, reactive, slightly brighter  Thought Process: tends to be pressured and circumstantial and preoccupied perseverating on discharge    Thought Content: some paranoia, mild paranoia, ideas of reference, but does appear as if paranoid  No current suicidal homicidal thoughts intent or plans verbalized  No phobias obsessions compulsions or distorted body perceptions elicited   no questions her believe about being hypnotized by the staff is a product of her mental and      Perceptual Disturbances: no auditory hallucinations, no visual hallucinations, To claim to hear voices but unable to elaborate what they tell her and claims to feel things are moving around her     Hx Risk Factors: chronic mood disorder, chronic psychotic symptoms, alcohol use, limted insight  Sensorium:         Oriented to 3 spheres and to situation  Cognition: recent and remote memory grossly intact  Consciousness: alert and awake  Attention: attention span and concentration are age appropriate  Intellect: appears to be of average intelligence  Insight: improving  Judgement: improving  Motor Activity: no abnormal movements     Vitals  Temp:  [97 2 °F (36 2 °C)-98 °F (36 7 °C)] 98 °F (36 7 °C)  HR:  [] 111  Resp:  [17-20] 20  BP: (121-128)/(57-88) 128/88  No intake or output data in the 24 hours ending 05/13/21 9493    Lab Results:  [facility] Admission Reviewed     Current Facility-Administered Medications   Medication Dose Route Frequency Provider Last Rate    acetaminophen  650 mg Oral Q6H PRN Lovena Dear Lodics, CRNP      acetaminophen  650 mg Oral Q4H PRN Lovena Dear Lodics, CRNP      acetaminophen  975 mg Oral Q6H PRN Isis N Germanics, CRNP      al mag oxide-diphenhydramine-lidocaine viscous  10 mL Swish & Swallow Q4H PRN Ok Balsam, RENATANP      albuterol  2 puff Inhalation Q6H PRN Isis N Lodics, CRNP      aluminum-magnesium hydroxide-simethicone  15 mL Oral Q4H PRN Jasmyn Pelletiererrat, CRNP      benztropine  1 mg Intramuscular Q4H PRN Max 6/day Isis N Lodics, CRNP      benztropine  1 mg Oral Q4H PRN Max 6/day Isis N Germanics, CRNP      benztropine  1 mg Oral BID Nhung Aly MD      calcium carbonate  500 mg Oral TID PRN Lovena Dear Lodics, JARED      diphenhydrAMINE  25 mg Oral HS Isis N Germanics, CRNP      escitalopram  20 mg Oral Daily JARED Waters  fenofibrate  145 mg Oral Daily Isis Lama, CRNP      gabapentin  600 mg Oral 4x Daily Isis Lama, CRNP      haloperidol  2 mg Oral 4x Daily Antonia Turner MD      haloperidol  5 mg Oral Q6H PRN Kalebacomo Habermann Lodics, CRNP      hydrOXYzine HCL  25 mg Oral Q6H PRN Max 4/day Isis Lama, CRNP      hydrOXYzine HCL  50 mg Oral Q6H PRN Max 4/day Isis Lama, CRNP      lidocaine  1 patch Topical Daily Isis Lama, CRNP      LORazepam  1 mg Intramuscular Q6H PRN Antonia Turner MD      LORazepam  0 5 mg Oral 4x Daily Antonia Turner MD      magnesium hydroxide  30 mL Oral Daily PRN Giacomo Habermann Lodics, CRNP      nicotine  1 patch Transdermal Daily Ovalles Ruts, CRNP      OLANZapine  2 5 mg Oral Q8H PRN Ovalles Ruts, CRNP      OLANZapine  5 mg Oral Q3H PRN Ovalles Ruts, CRNP      OLANZapine  7 5 mg Oral Q3H PRN Ovalles Ruts, CRNP      OLANZapine  10 mg Intramuscular Q3H PRN Ovalles Ruts, CRNP      OLANZapine  5 mg Intramuscular Q3H PRN Ovalles Ruts, CRNP      OLANZapine  10 mg Oral HS Bobo Recinos MD      OLANZapine  15 mg Oral Daily Bobo Recinos MD      Pramox-PE-Glycerin-Petrolatum  1 application Rectal 4x Daily PRN Isis Lama, CRNP      prazosin  1 mg Oral HS Ovalles Ruts, CRNP      psyllium  1 packet Oral Daily JARED Isbell         Counseling / Coordination of Care: Total floor / unit time spent today 15 minutes  Greater than 50% of total time was spent with the patient and / or family counseling and / or somewhat receptive to supportive listening and teaching positive coping skills to deal with symptom mangement  Patient's Rights, confidentiality and exceptions to confidentiality, use of automated medical record, [address] [place] [address] staff access to medical record, and consent to treatment reviewed     This note was  not shared with the patient because it might further aggravate her psychiatric condition  This note has been dictated

## 2021-05-13 NOTE — PROGRESS NOTES
05/13/21 1056   Team Meeting   Meeting Type Daily Rounds   Initial Conference Date 05/13/21   Patient/Family Present   Patient Present No   Patient's Family Present No       Daily Rounds Documentation  Team Members Participating  Dr Sherrilyn Said, MD Gilford Oto, RN Sheryle Lew, MARK ANTHONY Padilla, MARK ANTHONY Pierce [place], CPS    Case reviewed  Had two outbursts, able to direct herself to her room and closes door, then yells  Self induced vomiting what it appeared an entire meal yesterday, did not verbalize any symptoms  Eating disorder history  3/5 groups

## 2021-05-13 NOTE — PLAN OF CARE
Problem: Alteration in Thoughts and Perception  Goal: Verbalize thoughts and feelings  Description: Interventions:  - Promote a nonjudgmental and trusting relationship with the patient through active listening and therapeutic communication  - Assess patient's level of functioning, behavior and potential for risk  - Engage patient in 1 on 1 interactions  - Encourage patient to express fears, feelings, frustrations, and discuss symptoms    - [place] patient to reality, help patient recognize reality-based thinking   - Administer medications as ordered and assess for potential side effects  - Provide the patient education related to the signs and symptoms of the illness and desired effects of prescribed medications  Outcome: Progressing  Goal: Refrain from acting on delusional thinking/internal stimuli  Description: Interventions:  - Monitor patient closely, per order   - Utilize least restrictive measures   - Set reasonable limits, give positive feedback for acceptable   - Administer medications as ordered and monitor of potential side effects  Outcome: Progressing  Goal: Agree to be compliant with medication regime, as prescribed and report medication side effects  Description: Interventions:  - Offer appropriate PRN medication and supervise ingestion; conduct AIMS, as needed   Outcome: Progressing  Goal: Attend and participate in unit activities, including therapeutic, recreational, and educational groups  Description: Interventions:  -Encourage Visitation and family involvement in care  Outcome: Progressing     Problem: Ineffective Coping  Goal: Demonstrates healthy coping skills  Outcome: Progressing  Goal: Participates in unit activities  Description: Interventions:  - Provide therapeutic environment   - Provide required programming   - Redirect inappropriate behaviors   Outcome: Progressing  Goal: Understands least restrictive measures  Description: Interventions:  - Utilize least restrictive behavior  Outcome: Progressing     Problem: Risk for Self Injury/Neglect  Goal: Verbalize thoughts and feelings  Description: Interventions:  - Assess and re-assess patient's lethality and potential for self-injury  - Engage patient in 1:1 interactions, daily, for a minimum of 15 minutes  - Encourage patient to express feelings, fears, frustrations, hopes  - Establish rapport/trust with patient   Outcome: Progressing  Goal: Attend and participate in unit activities, including therapeutic, recreational, and educational groups  Description: Interventions:  - Provide therapeutic and educational activities daily, encourage attendance and participation, and document same in the medical record  - Obtain collateral information, encourage visitation and family involvement in care   Outcome: Progressing     Problem: Depression  Goal: Refrain from isolation  Description: Interventions:  - Develop a trusting relationship   - Encourage socialization   Outcome: Progressing     Problem: Anxiety  Goal: Anxiety is at manageable level  Description: Interventions:  - Assess and monitor patient's anxiety level  - Monitor for signs and symptoms (heart palpitations, chest pain, shortness of breath, headaches, nausea, feeling jumpy, restlessness, irritable, apprehensive)  - Collaborate with interdisciplinary team and initiate plan and interventions as ordered    - Register patient to unit/surroundings  - Explain treatment plan  - Encourage participation in care  - Encourage verbalization of concerns/fears  - Identify coping mechanisms  - Assist in developing anxiety-reducing skills  - Administer/offer alternative therapies  - Limit or eliminate stimulants  Outcome: Progressing     Problem: Risk for Violence/Aggression Toward Others  Goal: Refrain from destructive acts on the environment or property  Outcome: Progressing  Goal: Control angry outbursts  Description: Interventions:  - Monitor patient closely, per order  - Ensure early verbal de-escalation  - Monitor prn medication needs  - Set reasonable/therapeutic limits, outline behavioral expectations, and consequences   - Provide a non-threatening milieu, utilizing the least restrictive interventions   Outcome: Progressing  Goal: Attend and participate in unit activities, including therapeutic, recreational, and educational groups  Description: Interventions:  - Provide therapeutic and educational activities daily, encourage attendance and participation, and document same in the medical record   Outcome: Progressing

## 2021-05-14 PROCEDURE — 99232 SBSQ HOSP IP/OBS MODERATE 35: CPT | Performed by: PSYCHIATRY & NEUROLOGY

## 2021-05-14 RX ADMIN — FENOFIBRATE 145 MG: 145 TABLET ORAL at 10:03

## 2021-05-14 RX ADMIN — BENZTROPINE MESYLATE 1 MG: 1 TABLET ORAL at 08:43

## 2021-05-14 RX ADMIN — LORAZEPAM 0.5 MG: 0.5 TABLET ORAL at 21:37

## 2021-05-14 RX ADMIN — HALOPERIDOL 2 MG: 1 TABLET ORAL at 11:59

## 2021-05-14 RX ADMIN — DIPHENHYDRAMINE HCL 25 MG: 25 TABLET ORAL at 21:37

## 2021-05-14 RX ADMIN — HALOPERIDOL 2 MG: 1 TABLET ORAL at 08:41

## 2021-05-14 RX ADMIN — HALOPERIDOL 2 MG: 1 TABLET ORAL at 17:19

## 2021-05-14 RX ADMIN — GABAPENTIN 600 MG: 300 CAPSULE ORAL at 17:19

## 2021-05-14 RX ADMIN — PHENYTOIN 1 MG: 125 SUSPENSION ORAL at 21:37

## 2021-05-14 RX ADMIN — GABAPENTIN 600 MG: 300 CAPSULE ORAL at 11:59

## 2021-05-14 RX ADMIN — PSYLLIUM HUSK 1 PACKET: 3.4 POWDER ORAL at 08:42

## 2021-05-14 RX ADMIN — GABAPENTIN 600 MG: 300 CAPSULE ORAL at 08:41

## 2021-05-14 RX ADMIN — BENZTROPINE MESYLATE 1 MG: 1 TABLET ORAL at 10:02

## 2021-05-14 RX ADMIN — OLANZAPINE 10 MG: 10 TABLET, FILM COATED ORAL at 21:37

## 2021-05-14 RX ADMIN — OLANZAPINE 15 MG: 5 TABLET, FILM COATED ORAL at 08:41

## 2021-05-14 RX ADMIN — BENZTROPINE MESYLATE 1 MG: 1 TABLET ORAL at 17:19

## 2021-05-14 RX ADMIN — GABAPENTIN 600 MG: 300 CAPSULE ORAL at 21:37

## 2021-05-14 RX ADMIN — HALOPERIDOL 2 MG: 1 TABLET ORAL at 21:37

## 2021-05-14 RX ADMIN — LIDOCAINE 1 PATCH: 50 PATCH CUTANEOUS at 10:18

## 2021-05-14 RX ADMIN — ESCITALOPRAM OXALATE 20 MG: 10 TABLET ORAL at 08:40

## 2021-05-14 RX ADMIN — LORAZEPAM 0.5 MG: 0.5 TABLET ORAL at 11:58

## 2021-05-14 RX ADMIN — LORAZEPAM 0.5 MG: 0.5 TABLET ORAL at 17:19

## 2021-05-14 RX ADMIN — LORAZEPAM 0.5 MG: 0.5 TABLET ORAL at 08:41

## 2021-05-14 RX ADMIN — NICOTINE 1 PATCH: 21 PATCH, EXTENDED RELEASE TRANSDERMAL at 10:18

## 2021-05-14 RX ADMIN — ALUMINUM HYDROXIDE, MAGNESIUM HYDROXIDE, AND SIMETHICONE 15 ML: 200; 200; 20 SUSPENSION ORAL at 11:05

## 2021-05-14 NOTE — NURSING NOTE
Visible most of shift, no outbursts, behaviors or issues with peers  Med and meal compliant  Attended IMR   Denies any further issues   Precautions maintained

## 2021-05-14 NOTE — PLAN OF CARE
Problem: Nutrition/Hydration-ADULT  Goal: Nutrient/Hydration intake appropriate for improving, restoring or maintaining nutritional needs  Description: Monitor and assess patient's nutrition/hydration status for malnutrition  Collaborate with interdisciplinary team and initiate plan and interventions as ordered  Monitor patient's weight and dietary intake as ordered or per policy  Utilize nutrition screening tool and intervene as necessary  Determine patient's food preferences and provide high-protein, high-caloric foods as appropriate       INTERVENTIONS:  - Monitor oral intake, urinary output, labs, and treatment plans  - Assess nutrition and hydration status and recommend course of action  - Evaluate amount of meals eaten  - Assist patient with eating if necessary   - Allow adequate time for meals  - Recommend/ encourage appropriate diets, oral nutritional supplements, and vitamin/mineral supplements  - Order, calculate, and assess calorie counts as needed  - Recommend, monitor, and adjust tube feedings and TPN/PPN based on assessed needs  - Assess need for intravenous fluids  - Provide specific nutrition/hydration education as appropriate  - Include patient/family/caregiver in decisions related to nutrition  Outcome: Progressing     Problem: Alteration in Thoughts and Perception  Goal: Treatment Goal: Gain control of psychotic behaviors/thinking, reduce/eliminate presenting symptoms and demonstrate improved reality functioning upon discharge  Outcome: Progressing  Goal: Verbalize thoughts and feelings  Description: Interventions:  - Promote a nonjudgmental and trusting relationship with the patient through active listening and therapeutic communication  - Assess patient's level of functioning, behavior and potential for risk  - Engage patient in 1 on 1 interactions  - Encourage patient to express fears, feelings, frustrations, and discuss symptoms    - [place] patient to reality, help patient recognize reality-based thinking   - Administer medications as ordered and assess for potential side effects  - Provide the patient education related to the signs and symptoms of the illness and desired effects of prescribed medications  Outcome: Progressing  Goal: Refrain from acting on delusional thinking/internal stimuli  Description: Interventions:  - Monitor patient closely, per order   - Utilize least restrictive measures   - Set reasonable limits, give positive feedback for acceptable   - Administer medications as ordered and monitor of potential side effects  Outcome: Progressing  Goal: Agree to be compliant with medication regime, as prescribed and report medication side effects  Description: Interventions:  - Offer appropriate PRN medication and supervise ingestion; conduct AIMS, as needed   Outcome: Progressing  Goal: Attend and participate in unit activities, including therapeutic, recreational, and educational groups  Description: Interventions:  -Encourage Visitation and family involvement in care  Outcome: Progressing  Goal: Recognize dysfunctional thoughts, communicate reality-based thoughts at the time of discharge  Description: Interventions:  - Provide medication and psycho-education to assist patient in compliance and developing insight into his/her illness   Outcome: Progressing  Goal: Complete daily ADLs, including personal hygiene independently, as able  Description: Interventions:  - Observe, teach, and assist patient with ADLS  - Monitor and promote a balance of rest/activity, with adequate nutrition and elimination   Outcome: Progressing     Problem: Ineffective Coping  Goal: Cooperates with admission process  Description: Interventions:   - Complete admission process  Outcome: Progressing  Goal: Identifies ineffective coping skills  Outcome: Progressing  Goal: Identifies healthy coping skills  Outcome: Progressing  Goal: Demonstrates healthy coping skills  Outcome: Progressing  Goal: Participates in unit activities  Description: Interventions:  - Provide therapeutic environment   - Provide required programming   - Redirect inappropriate behaviors   Outcome: Progressing  Goal: Patient/Family participate in treatment and DC plans  Description: Interventions:  - Provide therapeutic environment  Outcome: Progressing  Goal: Patient/Family verbalizes awareness of resources  Outcome: Progressing  Goal: Understands least restrictive measures  Description: Interventions:  - Utilize least restrictive behavior  Outcome: Progressing  Goal: Free from restraint events  Description: - Utilize least restrictive measures   - Provide behavioral interventions   - Redirect inappropriate behaviors   Outcome: Progressing     Problem: Risk for Self Injury/Neglect  Goal: Treatment Goal: Remain safe during length of stay, learn and adopt new coping skills, and be free of self-injurious ideation, impulses and acts at the time of discharge  Outcome: Progressing  Goal: Verbalize thoughts and feelings  Description: Interventions:  - Assess and re-assess patient's lethality and potential for self-injury  - Engage patient in 1:1 interactions, daily, for a minimum of 15 minutes  - Encourage patient to express feelings, fears, frustrations, hopes  - Establish rapport/trust with patient   Outcome: Progressing  Goal: Refrain from harming self  Description: Interventions:  - Monitor patient closely, per order  - Develop a trusting relationship  - Supervise medication ingestion, monitor effects and side effects   Outcome: Progressing  Goal: Attend and participate in unit activities, including therapeutic, recreational, and educational groups  Description: Interventions:  - Provide therapeutic and educational activities daily, encourage attendance and participation, and document same in the medical record  - Obtain collateral information, encourage visitation and family involvement in care   Outcome: Progressing  Goal: Recognize maladaptive responses and adopt new coping mechanisms  Outcome: Progressing  Goal: Complete daily ADLs, including personal hygiene independently, as able  Description: Interventions:  - Observe, teach, and assist patient with ADLS  - Monitor and promote a balance of rest/activity, with adequate nutrition and elimination  Outcome: Progressing     Problem: Depression  Goal: Treatment Goal: Demonstrate behavioral control of depressive symptoms, verbalize feelings of improved mood/affect, and adopt new coping skills prior to discharge  Outcome: Progressing  Goal: Verbalize thoughts and feelings  Description: Interventions:  - Assess and re-assess patient's level of risk   - Engage patient in 1:1 interactions, daily, for a minimum of 15 minutes   - Encourage patient to express feelings, fears, frustrations, hopes   Outcome: Progressing  Goal: Refrain from harming self  Description: Interventions:  - Monitor patient closely, per order   - Supervise medication ingestion, monitor effects and side effects   Outcome: Progressing  Goal: Refrain from isolation  Description: Interventions:  - Develop a trusting relationship   - Encourage socialization   Outcome: Progressing  Goal: Refrain from self-neglect  Outcome: Progressing  Goal: Attend and participate in unit activities, including therapeutic, recreational, and educational groups  Description: Interventions:  - Provide therapeutic and educational activities daily, encourage attendance and participation, and document same in the medical record   Outcome: Progressing  Goal: Complete daily ADLs, including personal hygiene independently, as able  Description: Interventions:  - Observe, teach, and assist patient with ADLS  -  Monitor and promote a balance of rest/activity, with adequate nutrition and elimination   Outcome: Progressing     Problem: Anxiety  Goal: Anxiety is at manageable level  Description: Interventions:  - Assess and monitor patient's anxiety level     - Monitor for signs and symptoms (heart palpitations, chest pain, shortness of breath, headaches, nausea, feeling jumpy, restlessness, irritable, apprehensive)  - Collaborate with interdisciplinary team and initiate plan and interventions as ordered    - [place] patient to unit/surroundings  - Explain treatment plan  - Encourage participation in care  - Encourage verbalization of concerns/fears  - Identify coping mechanisms  - Assist in developing anxiety-reducing skills  - Administer/offer alternative therapies  - Limit or eliminate stimulants  Outcome: Progressing     Problem: Risk for Violence/Aggression Toward Others  Goal: Treatment Goal: Refrain from acts of violence/aggression during length of stay, and demonstrate improved impulse control at the time of discharge  Outcome: Progressing  Goal: Verbalize thoughts and feelings  Description: Interventions:  - Assess and re-assess patient's level of risk, every waking shift  - Engage patient in 1:1 interactions, daily, for a minimum of 15 minutes   - Allow patient to express feelings and frustrations in a safe and non-threatening manner   - Establish rapport/trust with patient   Outcome: Progressing  Goal: Refrain from harming others  Outcome: Progressing  Goal: Refrain from destructive acts on the environment or property  Outcome: Progressing  Goal: Control angry outbursts  Description: Interventions:  - Monitor patient closely, per order  - Ensure early verbal de-escalation  - Monitor prn medication needs  - Set reasonable/therapeutic limits, outline behavioral expectations, and consequences   - Provide a non-threatening milieu, utilizing the least restrictive interventions   Outcome: Progressing  Goal: Attend and participate in unit activities, including therapeutic, recreational, and educational groups  Description: Interventions:  - Provide therapeutic and educational activities daily, encourage attendance and participation, and document same in the medical record   Outcome: Progressing  Goal: Identify appropriate positive anger management techniques  Description: Interventions:  - Offer anger management and coping skills groups   - Staff will provide positive feedback for appropriate anger control  Outcome: Progressing     Problem: Alteration in Orientation  Goal: Treatment Goal: Demonstrate a reduction of confusion and improved orientation to person, place, time and/or situation upon discharge, according to optimum baseline/ability  Outcome: Progressing  Goal: Interact with staff daily  Description: Interventions:  - Assess and re-assess patient's level of orientation  - Engage patient in 1 on 1 interactions, daily, for a minimum of 15 minutes   - Establish rapport/trust with patient   Outcome: Progressing  Goal: Express concerns related to confused thinking related to:  Description: Interventions:  - Encourage patient to express feelings, fears, frustrations, hopes  - Assign consistent caregivers   - [place]/re-orient patient as needed  - Allow comfort items, as appropriate  - Provide visual cues, signs, etc    Outcome: Progressing  Goal: Allow medical examinations, as recommended  Description: Interventions:  - Provide physical/neurological exams and/or referrals, per provider   Outcome: Progressing  Goal: Cooperate with recommended testing/procedures  Description: Interventions:  - Determine need for ancillary testing  - Observe for mental status changes  - Implement falls/precaution protocol   Outcome: Progressing  Goal: Attend and participate in unit activities, including therapeutic, recreational, and educational groups  Description: Interventions:  - Provide therapeutic and educational activities daily, encourage attendance and participation, and document same in the medical record   - Provide appropriate opportunities for reminiscence   - Provide a consistent daily routine   - Encourage family contact/visitation   Outcome: Progressing  Goal: Complete daily ADLs, including personal hygiene independently, as able  Description: Interventions:  - Observe, teach, and assist patient with ADLS  Outcome: Progressing     Problem: Individualized Interventions  Goal: Patient will verbalize appropriate use of telephone within 5 days  Description: Interventions:  - Treatment team to determine use of supervised phone privileges   Outcome: Progressing  Goal: Patient will verbalize need for hospitalization and will no longer attempt elopement within 5 days  Description: Interventions:  - Ongoing education to help patient understand need for hospitalization  Outcome: Progressing  Goal: Patient will recognize inappropriate behaviors and develop alternative behaviors within 5 days  Description: Interventions:  - Patient in collaboration with Treatment Team will develop a behavior management plan to help identify effective coping skills to deal with stressors  Outcome: Progressing     Problem: DISCHARGE PLANNING - CARE MANAGEMENT  Goal: Discharge to post-acute care or home with appropriate resources  Description: INTERVENTIONS:  - Conduct assessment to determine patient/family and health care team treatment goals, and need for post-acute services based on payer coverage, community resources, and patient preferences, and barriers to discharge  - Address psychosocial, clinical, and financial barriers to discharge as identified in assessment in conjunction with the patient/family and health care team  - Arrange appropriate level of post-acute services according to patients   needs and preference and payer coverage in collaboration with the physician and health care team  - Communicate with and update the patient/family, physician, and health care team regarding progress on the discharge plan  - Arrange appropriate transportation to post-acute venues  Outcome: Progressing

## 2021-05-14 NOTE — NURSING NOTE
Patient is pleasant and cooperative with her care and medications  Patient has eaten 100% of her meals  Patient yelled out two times while on the phone call and than stormed out to her room and had was able to calm on her own or with nurses help  She remains irritable and labile and keeps to herself
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
## 2021-05-14 NOTE — NURSING NOTE
Charlie Rivera has been awake, alert, and visible intermittently out in the milieu  Pt ate 100% supper  Minimal interaction noted with peers  Pt rests in room at intervals  Pt yelled out from room x 2 this shift but able to calm self down  Pt remains delusional and believes that she has fibromyalgia  Attended evening wrap up group  Had evening snack  Compliant with scheduled meds when called  Mood remains irritable and labile  Continue to monitor/assess for any changes

## 2021-05-14 NOTE — NURSING NOTE
Kirsty Quintero maintained on ongoing SAFE precaution without incident on this shift   Laying in bed with eyes closed, breath even and unlabored    Q 7 minutes rounding implemented   Behavioral control no outburst   Will continue to monitor

## 2021-05-14 NOTE — PROGRESS NOTES
Psychiatry Progress Note [facility] 53 y o  female MRN: [id number]  Unit/Bed#: [facility] 109-01 Encounter: [id number]  Code Status: Level 1 - Full Code    PCP: Tanna Estrada DO    Date of Admission:  4/7/2021 1435   Date of Service:  05/14/21    Patient Active Problem List   Diagnosis    Schizoaffective disorder, bipolar type ([facility] )    Uncomplicated alcohol dependence ([facility] )    Suicidal behavior    LYNN (generalized anxiety disorder)    Slow transit constipation    Deficiency of vitamin B    Degenerative disc disease, lumbar    Post-traumatic stress disorder, chronic    Lower extremity weakness    Generalized abdominal pain    Non-intractable vomiting    Medical clearance for psychiatric admission    Carbuncle    Alcohol dependence with unspecified alcohol-induced disorder ([facility] )    Mild intermittent asthma without complication    Tobacco abuse    Ear problem, bilateral     Review of systems:      Unremarkable today claims that sometimes she screams because of pain which has been chronic in her legs for which she uses Tylenol  Diagnosis:         Schizoaffective bipolar, alcohol use disorder episodic in early remission in controlled environment  ,Assessment   Overall Status:         Continues to yell and scream at times but less often and more redirectable   Certification Statement: The patient will continue to require additional inpatient hospital stay due to ongoing mood swings paranoia   Acceptance by patient:  accepting    Hopefulness in recovery:  Living in a group home   Understanding of medications: has good understanding   Involved in reintegration process:  Adjusting to the unit   Trusting in relationship with psychiatrist:  trusting   Justification for dual anti-psychotics:  Not applicable   Side effects from treatment: none    Medication changes     Patient agreeing to go back on [place] instead of [place] beginning May 18th his last Jina Reveal was given on 02/23/2021 as per the act team as 819 mg  Non-pharmacological treatments   Continue with individual, group, milieu and occupational therapy using recovery principles and psycho-education about accepting illness and the need for treatment  Safety   Safety and communication plan established to target dynamic risk factors discussed above  Discharge Plan     most likely to a group home with the act team    Interval Progress       Patient occasionally yells and screams at the top of her voice but has not been med seeking lately  Continues to claim to hear voices that makes her scream but she cannot explain what the tell her and refers to them as psychotic episodes  It she is trying to learn positive coping skills rather than asking for p r n  medications  Still believes staff maybe doing hypnosis on her and she understands it is a product of her mental illness   Easily agitated, angry, labile as reported by staff and was verbally aggressive with a peer fighting for the phone       Sleep:          good   appetite:        good   compliance with medications :    good   Side  Effects:                  none   significant events:          Remains delusional about staff doing hypnosis on her and still screams and yells at times but less often   group attendance:      Attending 3/5 groups     Mental Status Exam  Appearance: age appropriate, casually dressed, adequate grooming, looks older than stated age, underweight     Behavior: normal, pleasant, cooperative, appears anxious,  more friendly today and not confrontational    Speech: fluent, coherent, hypertalkative,   Circumstantial pressured    Mood: depressed, anxious, labile, euphoric  Affect: inappropriate, labile, reactive, slightly brighter  Thought Process: tends to be pressured and circumstantial and preoccupied perseverating on discharge    Thought Content: some paranoia, mild paranoia, ideas of reference, but does appear as if paranoid  No current suicidal homicidal thoughts intent or plans verbalized  No phobias obsessions compulsions or distorted body perceptions elicited  Still things staff maybe  Hypnotizing her  Perceptual Disturbances: no auditory hallucinations, no visual hallucinations, To claim to hear voices but unable to elaborate what they tell her and claims to feel things are moving around her     Hx Risk Factors: chronic mood disorder, chronic psychotic symptoms, alcohol use, limted insight  Sensorium:          Oriented to 3 spheres and to situation  Cognition: recent and remote memory grossly intact  Consciousness: alert and awake  Attention: attention span and concentration are age appropriate  Intellect: appears to be of average intelligence  Insight: improving  Judgement: improving  Motor Activity: no abnormal movements     Vitals  Temp:  [97 7 °F (36 5 °C)-98 2 °F (36 8 °C)] 98 2 °F (36 8 °C)  HR:  [84-98] 98  Resp:  [16-20] 16  BP: (105-109)/(57-70) 105/57  No intake or output data in the 24 hours ending 05/14/21 0554    Lab Results:  [facility] Admission Reviewed     Current Facility-Administered Medications   Medication Dose Route Frequency Provider Last Rate    acetaminophen  650 mg Oral Q6H PRN Lindsey Side Lodics, CRNP      acetaminophen  650 mg Oral Q4H PRN Lindsey Side Lodics, CRNP      acetaminophen  975 mg Oral Q6H PRN Isis Porterics, JARED      al mag oxide-diphenhydramine-lidocaine viscous  10 mL Swish & Swallow Q4H PRN Melven Dung, CRNP      albuterol  2 puff Inhalation Q6H PRN Isis N Lodics, CRNP      aluminum-magnesium hydroxide-simethicone  15 mL Oral Q4H PRN Jasmyn Deng, JARED      benztropine  1 mg Intramuscular Q4H PRN Max 6/day Isis N Lodics, JARED      benztropine  1 mg Oral Q4H PRN Max 6/day Isisrebeka Porterics, JARED      benztropine  1 mg Oral BID Judi Foley MD      calcium carbonate  500 mg Oral TID PRN JARED Soriano      diphenhydrAMINE  25 mg Oral HS Isis Lama, CRNP      escitalopram  20 mg Oral Daily Isis SHONA Lama, CRNP      fenofibrate  145 mg Oral Daily Isis SHONA Lama, CRNP      gabapentin  600 mg Oral 4x Daily Isis Lama, CRNP      haloperidol  2 mg Oral 4x Daily Jayne Chapman MD      haloperidol  5 mg Oral Q6H PRN Armando Bullocks Lodics, CRNP      hydrOXYzine HCL  25 mg Oral Q6H PRN Max 4/day Isis Lama, CRNP      hydrOXYzine HCL  50 mg Oral Q6H PRN Max 4/day Isis Lama, CRNP      lidocaine  1 patch Topical Daily Isis Lama, CRNP      LORazepam  1 mg Intramuscular Q6H PRN Jayne Chapman MD      LORazepam  0 5 mg Oral 4x Daily Jayne Chapman MD      magnesium hydroxide  30 mL Oral Daily PRN Armando Bullocks Lodics, CRNP      nicotine  1 patch Transdermal Daily Davie Elders, CRNP      OLANZapine  2 5 mg Oral Q8H PRN Davie Elders, CRNP      OLANZapine  5 mg Oral Q3H PRN Davie Elders, CRNP      OLANZapine  7 5 mg Oral Q3H PRN Davie Elders, CRNP      OLANZapine  10 mg Intramuscular Q3H PRN Davie Elders, CRNP      OLANZapine  5 mg Intramuscular Q3H PRN Davie Elders, CRNP      OLANZapine  10 mg Oral HS Marlena Navarro MD      OLANZapine  15 mg Oral Daily Marlena Navarro MD      Pramox-PE-Glycerin-Petrolatum  1 application Rectal 4x Daily PRN Isis Lama, CRNP      prazosin  1 mg Oral HS Davie Elders, CRNP      psyllium  1 packet Oral Daily JARED Isbell         Counseling / Coordination of Care: Total floor / unit time spent today 15 minutes  Greater than 50% of total time was spent with the patient and / or family counseling and / or somewhat receptive to supportive listening and teaching positive coping skills to deal with symptom mangement  Patient's Rights, confidentiality and exceptions to confidentiality, use of automated medical record, Howie Vernon misbah staff access to medical record, and consent to treatment reviewed  This note was  not shared with the patient because it might further aggravate her psychiatric condition  This note has been dictated

## 2021-05-14 NOTE — PROGRESS NOTES
05/14/21 1100   Activity/Group Checklist   Group Other (Comment)  (Recovery Management)   Attendance Attended   Attendance Duration (min) 46-60   Interactions Interacted appropriately   Affect/Mood Appropriate;Bright   Goals Achieved Identified feelings; Identified triggers; Identified relapse prevention strategies; Identified medication adherence strategies; Discussed discharge plans; Increased hopefulness; Able to reflect/comment on own behavior;Verbalized increased hopefulness; Able to self-disclose     Did excellent in group; very insightful today!

## 2021-05-14 NOTE — PLAN OF CARE
Problem: Alteration in Thoughts and Perception  Goal: Attend and participate in unit activities, including therapeutic, recreational, and educational groups  Description: Interventions:  -Encourage Visitation and family involvement in care  Outcome: Progressing   Patient completed Goal Card for the week of 5/17/21    Goals: Eat healthy              Get a higher % of group attendance              Read more often

## 2021-05-15 PROCEDURE — 99232 SBSQ HOSP IP/OBS MODERATE 35: CPT | Performed by: REGISTERED NURSE

## 2021-05-15 RX ADMIN — DIPHENHYDRAMINE HCL 25 MG: 25 TABLET ORAL at 21:11

## 2021-05-15 RX ADMIN — HALOPERIDOL 2 MG: 1 TABLET ORAL at 11:17

## 2021-05-15 RX ADMIN — BENZTROPINE MESYLATE 1 MG: 1 TABLET ORAL at 17:03

## 2021-05-15 RX ADMIN — LIDOCAINE 1 PATCH: 50 PATCH CUTANEOUS at 08:24

## 2021-05-15 RX ADMIN — HALOPERIDOL 2 MG: 1 TABLET ORAL at 08:24

## 2021-05-15 RX ADMIN — HALOPERIDOL 2 MG: 1 TABLET ORAL at 21:11

## 2021-05-15 RX ADMIN — ESCITALOPRAM OXALATE 20 MG: 10 TABLET ORAL at 08:23

## 2021-05-15 RX ADMIN — LORAZEPAM 0.5 MG: 0.5 TABLET ORAL at 21:11

## 2021-05-15 RX ADMIN — BENZTROPINE MESYLATE 1 MG: 1 TABLET ORAL at 08:24

## 2021-05-15 RX ADMIN — NICOTINE 1 PATCH: 21 PATCH, EXTENDED RELEASE TRANSDERMAL at 08:24

## 2021-05-15 RX ADMIN — OLANZAPINE 10 MG: 10 TABLET, FILM COATED ORAL at 21:11

## 2021-05-15 RX ADMIN — PSYLLIUM HUSK 1 PACKET: 3.4 POWDER ORAL at 08:24

## 2021-05-15 RX ADMIN — GABAPENTIN 600 MG: 300 CAPSULE ORAL at 17:03

## 2021-05-15 RX ADMIN — GABAPENTIN 600 MG: 300 CAPSULE ORAL at 08:23

## 2021-05-15 RX ADMIN — ALUMINUM HYDROXIDE, MAGNESIUM HYDROXIDE, AND SIMETHICONE 15 ML: 200; 200; 20 SUSPENSION ORAL at 11:29

## 2021-05-15 RX ADMIN — LORAZEPAM 0.5 MG: 0.5 TABLET ORAL at 17:03

## 2021-05-15 RX ADMIN — GABAPENTIN 600 MG: 300 CAPSULE ORAL at 21:10

## 2021-05-15 RX ADMIN — FENOFIBRATE 145 MG: 145 TABLET ORAL at 08:25

## 2021-05-15 RX ADMIN — LORAZEPAM 0.5 MG: 0.5 TABLET ORAL at 11:17

## 2021-05-15 RX ADMIN — OLANZAPINE 15 MG: 5 TABLET, FILM COATED ORAL at 08:24

## 2021-05-15 RX ADMIN — LORAZEPAM 0.5 MG: 0.5 TABLET ORAL at 08:24

## 2021-05-15 RX ADMIN — PHENYTOIN 1 MG: 125 SUSPENSION ORAL at 21:11

## 2021-05-15 RX ADMIN — GABAPENTIN 600 MG: 300 CAPSULE ORAL at 11:17

## 2021-05-15 RX ADMIN — HALOPERIDOL 2 MG: 1 TABLET ORAL at 17:03

## 2021-05-15 RX ADMIN — CALCIUM CARBONATE (ANTACID) CHEW TAB 500 MG 500 MG: 500 CHEW TAB at 14:19

## 2021-05-15 NOTE — PLAN OF CARE
Problem: Alteration in Thoughts and Perception  Goal: Refrain from acting on delusional thinking/internal stimuli  Description: Interventions:  - Monitor patient closely, per order   - Utilize least restrictive measures   - Set reasonable limits, give positive feedback for acceptable   - Administer medications as ordered and monitor of potential side effects  Outcome: Not Progressing  Goal: Agree to be compliant with medication regime, as prescribed and report medication side effects  Description: Interventions:  - Offer appropriate PRN medication and supervise ingestion; conduct AIMS, as needed   Outcome: Progressing  Goal: Attend and participate in unit activities, including therapeutic, recreational, and educational groups  Description: Interventions:  -Encourage Visitation and family involvement in care  Outcome: Progressing  Goal: Recognize dysfunctional thoughts, communicate reality-based thoughts at the time of discharge  Description: Interventions:  - Provide medication and psycho-education to assist patient in compliance and developing insight into his/her illness   Outcome: Not Progressing  Goal: Complete daily ADLs, including personal hygiene independently, as able  Description: Interventions:  - Observe, teach, and assist patient with ADLS  - Monitor and promote a balance of rest/activity, with adequate nutrition and elimination   Outcome: Progressing

## 2021-05-15 NOTE — PROGRESS NOTES
Progress Note - Behavioral Health   Lexy Tomlin 53 y o  female MRN: [id number]  [facility] Encounter: [id number]    Assessment/Plan   Principal Problem:    Schizoaffective disorder, bipolar type ([address] )  Active Problems:    Post-traumatic stress disorder, chronic    Medical clearance for psychiatric admission    Mild intermittent asthma without complication    Ear problem, bilateral      Subjective:Patient was seen today for continuation of care, records reviewed and  patient was discussed with charge nurse  Anthony Wisdom reports that she is doing "well overall"  Patient denies endorsing any suicidal or homicidal ideation  She reports that she is gaining coping skills that include reads the bible, journaling, reading and coloring  Does not seem to be experiencing any manic symptoms  She denies auditory and visual hallucinations  She still endorses some paranoia  She feels paranoia has decreased since being here at [facility]  She added that she feels the therapy is helpful  Remains medication compliant  She rates depression as 2/10 and anxiety 2/10 on scale with 10 being worse symptoms  Denies any side effects from medications  Staff did report that yesterday Anthony iWsdom became upset after phone call and was yelling in her room, but was redirectable  Anthony Wisdom was pleasant and cooperative with conversation with this writer  After speaking with provider a few minutes later it, was noted that Anthony Wisdom went to her room and started to yell, she was redirected by staff without further incident  Mood continues to be labile       Psychiatric Review of Systems:    Sleep: normal, denies any nightmares  Appetite: normal  Medication side effects: No   ROS: no complaints, all other systems are negative    Vitals:  Vitals:    05/15/21 0700   BP: 113/67   Pulse: 98   Resp: 18   Temp: (!) 97 4 °F (36 3 °C)       Mental Status Evaluation:    Appearance:  age appropriate, casually dressed, dressed appropriately, adequate grooming Behavior:  pleasant, cooperative during conversation with CRNP   Speech:  normal rate and volume   Mood:  improved, per staff remains labile    Affect:  Labile and mood reactive   Thought Process:  logical, coherent   Associations: intact associations   Thought Content:  some paranoia   Perceptual Disturbances: none   Risk Potential: Suicidal ideation - None  Homicidal ideation - none  Potential for aggression - can be verbally agressive with yelling epsiodes but none noted so far this shift   Sensorium:  oriented to person, place and time/date   Memory:  recent and remote memory grossly intact   Consciousness:  alert and awake   Attention: attention span and concentration are age appropriate   Insight:  limited   Judgment: limited   Gait/Station: normal gait/station   Motor Activity: no abnormal movements     Laboratory results:    I have personally reviewed all pertinent laboratory/tests results    Most Recent Labs:   Lab Results   Component Value Date    WBC 6 80 04/09/2021    RBC 4 32 04/09/2021    HGB 13 0 04/09/2021    HCT 39 5 04/09/2021     04/09/2021    RDW 14 3 04/09/2021    NEUTROABS 7 10 (H) 03/16/2021    SODIUM 139 05/03/2021    K 4 4 05/03/2021     05/03/2021    CO2 30 05/03/2021    BUN 14 05/03/2021    CREATININE 0 61 05/03/2021    GLUC 94 05/03/2021    GLUF 94 05/03/2021    CALCIUM 9 8 05/03/2021    AST 25 05/03/2021    ALT 18 05/03/2021    ALKPHOS 56 05/03/2021    TP 7 2 05/03/2021    ALB 4 2 05/03/2021    TBILI 0 25 05/03/2021    CHOLESTEROL 300 (H) 03/31/2021    HDL 57 03/31/2021    TRIG 658 (H) 03/31/2021    LDLCALC  03/31/2021      Comment:      Calculated LDL invalid, triglycerides >400 mg/dl    NONHDLC 147 08/23/2020    AMMONIA 28 10/27/2017    [id number] 3 810 04/09/2021    FREET4 0 89 03/24/2016    PREGUR negative 03/16/2021    PREGSERUM Negative 10/21/2019    HCG <2 00 04/10/2014    RPR Non-Reactive 03/31/2021    HGBA1C 5 0 08/06/2019    EAG 97 08/06/2019       Progress Toward Goals:     Progressing  Insight and judgment are still limited, but improving  She continues to have mood lability  Discharge planning is ongoing       Recommended Treatment:     All current active medications have been reviewed  Encourage group therapy, milieu therapy and occupational therapy  Behavioral Health checks every 7 minutes  Observe progress over the weekend  Continue current medications:  Current Facility-Administered Medications   Medication Dose Route Frequency Provider Last Rate    acetaminophen  650 mg Oral Q6H PRN Lisette Osler Lodics, CRNP      acetaminophen  650 mg Oral Q4H PRN Lisette Osler Lodics, CRNP      acetaminophen  975 mg Oral Q6H PRN Isis Lama, CRNP      al mag oxide-diphenhydramine-lidocaine viscous  10 mL Swish & Swallow Q4H PRN Leigh Ann Stephen, CRNP      albuterol  2 puff Inhalation Q6H PRN Isis Lama, CRNP      aluminum-magnesium hydroxide-simethicone  15 mL Oral Q4H PRN Jasmyn Deng, CRNP      benztropine  1 mg Intramuscular Q4H PRN Max 6/day Isis Lama, CRNP      benztropine  1 mg Oral Q4H PRN Max 6/day Isis Lama, CRNP      benztropine  1 mg Oral BID Jigna Brumfield MD      calcium carbonate  500 mg Oral TID PRN Lisette Osler Lodics, CRNP      diphenhydrAMINE  25 mg Oral HS Isis Lama, CRNP      escitalopram  20 mg Oral Daily Isis Lama, CRNP      fenofibrate  145 mg Oral Daily Isis Lama, CRNP      gabapentin  600 mg Oral 4x Daily Isis Lama, CRNP      haloperidol  2 mg Oral 4x Daily Cristhian Martin MD      haloperidol  5 mg Oral Q6H PRN Isis Lama, CRNP      hydrOXYzine HCL  25 mg Oral Q6H PRN Max 4/day Isis Lama, CRNP      hydrOXYzine HCL  50 mg Oral Q6H PRN Max 4/day Isis Lama, CRNP      lidocaine  1 patch Topical Daily Isis Lama, CRNP      LORazepam  1 mg Intramuscular Q6H PRN Cristhian Martin MD      LORazepam  0 5 mg Oral 4x Daily Cristhian Martin MD      magnesium hydroxide  30 mL Oral Daily PRN Sara Lama, JARED      nicotine  1 patch Transdermal Daily Skip Hoe, JARED      OLANZapine  2 5 mg Oral Q8H PRN Skip Hoe, CRNP      OLANZapine  5 mg Oral Q3H PRN Skip Hoe, CRNP      OLANZapine  7 5 mg Oral Q3H PRN Skip Hoe, CRNP      OLANZapine  10 mg Intramuscular Q3H PRN Skpi Hoe, CRNP      OLANZapine  5 mg Intramuscular Q3H PRN Skip Hoe, CRNP      OLANZapine  10 mg Oral HS Iqra Caal MD      OLANZapine  15 mg Oral Daily Onelia Gary MD      Pramox-PE-Glycerin-Petrolatum  1 application Rectal 4x Daily PRN Isis Lama, JARED      prazosin  1 mg Oral HS Skip Hokierra, JARED      psyllium  1 packet Oral Daily JARED Isbell         Risks / Benefits of Treatment:     Risks, benefits, and possible side effects of medications explained to patient and patient verbalizes understanding and agreement for treatment  Counseling / Coordination of Care:     Patient's progress reviewed with nursing staff  Medications, treatment progress and treatment plan reviewed with patient  Supportive counseling provided to the patient          JARED Plata

## 2021-05-15 NOTE — NURSING NOTE
Beryle Dolores has been visible  Patient has been irritable, labile, and attention seeking during the shift  Patient has been yelling in room "I hate you, I hate you, I hate everyone this is disgusting " Patient proceeded to say  "I'm going in the quite room " Patient laid down for approximately 20 minutes than came out  Beryle Dolores has had continued behaviors during the shift slamming doors and yelling intermittently  Patient was reminded several times of appropriate behaviors  She continues to be manipulative, intrusive, and labile  Patient consumed 75% of dinner  Med and meal compliant with prompting  Attended wrap up and therapeutic activity group   Continue to monitor

## 2021-05-15 NOTE — NURSING NOTE
Visible most of shift but labile and irritable at times  Was out on deck for group and came running in to room and had episode of yelling and cursing in room and saying that she is having " food hallucinations" and that she is tired of them  Returned to deck and then took scheduled meds, no prns needed  No further yelling noted  Med and meal compliant   Precautions maintained

## 2021-05-16 PROCEDURE — 99232 SBSQ HOSP IP/OBS MODERATE 35: CPT | Performed by: REGISTERED NURSE

## 2021-05-16 RX ADMIN — FENOFIBRATE 145 MG: 145 TABLET ORAL at 08:10

## 2021-05-16 RX ADMIN — HALOPERIDOL 2 MG: 1 TABLET ORAL at 11:55

## 2021-05-16 RX ADMIN — LORAZEPAM 0.5 MG: 0.5 TABLET ORAL at 21:13

## 2021-05-16 RX ADMIN — OLANZAPINE 15 MG: 5 TABLET, FILM COATED ORAL at 08:08

## 2021-05-16 RX ADMIN — DIPHENHYDRAMINE HCL 25 MG: 25 TABLET ORAL at 21:13

## 2021-05-16 RX ADMIN — BENZTROPINE MESYLATE 1 MG: 1 TABLET ORAL at 08:09

## 2021-05-16 RX ADMIN — GABAPENTIN 600 MG: 300 CAPSULE ORAL at 11:56

## 2021-05-16 RX ADMIN — LORAZEPAM 0.5 MG: 0.5 TABLET ORAL at 08:08

## 2021-05-16 RX ADMIN — LORAZEPAM 0.5 MG: 0.5 TABLET ORAL at 17:06

## 2021-05-16 RX ADMIN — HALOPERIDOL 2 MG: 1 TABLET ORAL at 21:13

## 2021-05-16 RX ADMIN — BENZTROPINE MESYLATE 1 MG: 1 TABLET ORAL at 17:07

## 2021-05-16 RX ADMIN — GABAPENTIN 600 MG: 300 CAPSULE ORAL at 21:13

## 2021-05-16 RX ADMIN — LIDOCAINE 1 PATCH: 50 PATCH CUTANEOUS at 08:10

## 2021-05-16 RX ADMIN — HALOPERIDOL 2 MG: 1 TABLET ORAL at 08:09

## 2021-05-16 RX ADMIN — NICOTINE 1 PATCH: 21 PATCH, EXTENDED RELEASE TRANSDERMAL at 08:11

## 2021-05-16 RX ADMIN — LORAZEPAM 0.5 MG: 0.5 TABLET ORAL at 11:56

## 2021-05-16 RX ADMIN — PSYLLIUM HUSK 1 PACKET: 3.4 POWDER ORAL at 08:10

## 2021-05-16 RX ADMIN — GABAPENTIN 600 MG: 300 CAPSULE ORAL at 08:09

## 2021-05-16 RX ADMIN — HALOPERIDOL 2 MG: 1 TABLET ORAL at 17:06

## 2021-05-16 RX ADMIN — PHENYTOIN 1 MG: 125 SUSPENSION ORAL at 21:13

## 2021-05-16 RX ADMIN — OLANZAPINE 10 MG: 10 TABLET, FILM COATED ORAL at 21:14

## 2021-05-16 RX ADMIN — GABAPENTIN 600 MG: 300 CAPSULE ORAL at 17:06

## 2021-05-16 RX ADMIN — ESCITALOPRAM OXALATE 20 MG: 10 TABLET ORAL at 08:09

## 2021-05-16 NOTE — PROGRESS NOTES
Progress Note - Behavioral Health   Zeynep Leblanc 53 y o  female MRN: [id number]  [facility] Encounter: [id number]    Assessment/Plan   Principal Problem:    Schizoaffective disorder, bipolar type ([address] )  Active Problems:    Post-traumatic stress disorder, chronic    Medical clearance for psychiatric admission    Mild intermittent asthma without complication    Ear problem, bilateral      Subjective:Patient was seen today for continuation of care, records reviewed and  patient was discussed with the charge nurse  Rhina Zuleta was seen today in the conference room  She was pleasant, cooperative and bright upon approach  She reported that she felt calm and more positive today  She added that she felt great after her morning shower  Nursing staff reported that Rhina Zuleta utilized the seclusion room last evening to calm down and it was effective  Rhina Zuleta has been in behavioral control thus far today  Patient denies endorsing any suicidal or homicidal ideation  Does not seem to be experiencing any manic or psychotic symptoms during our encounter  Remains medication compliant,  denies any side effects from medications  She reports depression 1/10 and anxiety 0/10 on scale with 10 being worse symptoms       Psychiatric Review of Systems:    Sleep: normal  Appetite: normal  Medication side effects: No   ROS: no complaints, all other systems are negative    Vitals:  Vitals:    05/16/21 0700   BP: 112/57   Pulse: 86   Resp: 17   Temp: 97 7 °F (36 5 °C)       Mental Status Evaluation:    Appearance:  age appropriate, casually dressed, dressed appropriately, adequate grooming   Behavior:  pleasant, cooperative, calm during conversation with this provider   Speech:  normal rate and volume   Mood:  improved, today with no behavioral outbursts noted so far this shift   Affect:  labile   Thought Process:  logical, coherent, goal directed   Associations: intact associations   Thought Content:  no overt delusions   Perceptual Disturbances: none   Risk Potential: Suicidal ideation - None  Homicidal ideation - None  Potential for aggression - Not at present   Sensorium:  oriented to person, place and time/date   Memory:  recent and remote memory grossly intact   Consciousness:  alert and awake   Attention: attention span and concentration are age appropriate   Insight:  limited   Judgment: limited   Gait/Station: normal gait/station   Motor Activity: no abnormal movements     Laboratory results:    I have personally reviewed all pertinent laboratory/tests results  Most Recent Labs:   Lab Results   Component Value Date    WBC 6 80 04/09/2021    RBC 4 32 04/09/2021    HGB 13 0 04/09/2021    HCT 39 5 04/09/2021     04/09/2021    RDW 14 3 04/09/2021    NEUTROABS 7 10 (H) 03/16/2021    SODIUM 139 05/03/2021    K 4 4 05/03/2021     05/03/2021    CO2 30 05/03/2021    BUN 14 05/03/2021    CREATININE 0 61 05/03/2021    GLUC 94 05/03/2021    GLUF 94 05/03/2021    CALCIUM 9 8 05/03/2021    AST 25 05/03/2021    ALT 18 05/03/2021    ALKPHOS 56 05/03/2021    TP 7 2 05/03/2021    ALB 4 2 05/03/2021    TBILI 0 25 05/03/2021    CHOLESTEROL 300 (H) 03/31/2021    HDL 57 03/31/2021    TRIG 658 (H) 03/31/2021    LDLCALC  03/31/2021      Comment:      Calculated LDL invalid, triglycerides >400 mg/dl    NONHDLC 147 08/23/2020    AMMONIA 28 10/27/2017    [id number] 3 810 04/09/2021    FREET4 0 89 03/24/2016    PREGUR negative 03/16/2021    PREGSERUM Negative 10/21/2019    HCG <2 00 04/10/2014    RPR Non-Reactive 03/31/2021    HGBA1C 5 0 08/06/2019    EAG 97 08/06/2019       Progress Toward Goals:     Progressing  Insight and judgment are still limited, but improving  She continues to have mood lability        Recommended Treatment:     All current active medications have been reviewed  Encourage group therapy, milieu therapy and occupational therapy  Behavioral Health checks every 7 minutes  Observe progress over the weekend  Continue current medications:  Current Facility-Administered Medications   Medication Dose Route Frequency Provider Last Rate    acetaminophen  650 mg Oral Q6H PRN [place] Perch Lodics, CRNP      acetaminophen  650 mg Oral Q4H PRN [place] Perch Lodics, CRNP      acetaminophen  975 mg Oral Q6H PRN [place] Perch Lodics, CRNP      al mag oxide-diphenhydramine-lidocaine viscous  10 mL Swish & Swallow Q4H PRN Seth Kan, CRNP      albuterol  2 puff Inhalation Q6H PRN Isis Porterics, CRNP      aluminum-magnesium hydroxide-simethicone  15 mL Oral Q4H PRN Jasmyn GRANADOS Sowmya, CRNP      benztropine  1 mg Intramuscular Q4H PRN Max 6/day Isis Porterics, CRNP      benztropine  1 mg Oral Q4H PRN Max 6/day Isis Porterics, CRNP      benztropine  1 mg Oral BID Stephanie Holman MD      calcium carbonate  500 mg Oral TID PRN [place] Perch Lodics, CRNP      diphenhydrAMINE  25 mg Oral HS Isis Lama, CRNP      escitalopram  20 mg Oral Daily Isis Lama, CRNP      fenofibrate  145 mg Oral Daily Isis Lama, CRNP      gabapentin  600 mg Oral 4x Daily Isis Lama, CRNP      haloperidol  2 mg Oral 4x Daily Soledad Del Rio MD      haloperidol  5 mg Oral Q6H PRN Isis Lama, CRNP      hydrOXYzine HCL  25 mg Oral Q6H PRN Max 4/day Isis Lama, CRNP      hydrOXYzine HCL  50 mg Oral Q6H PRN Max 4/day Isis Lama, CRNP      lidocaine  1 patch Topical Daily Isis Lama, CRNP      LORazepam  1 mg Intramuscular Q6H PRN Soledad Del Rio MD      LORazepam  0 5 mg Oral 4x Daily Soledad Del Rio MD      magnesium hydroxide  30 mL Oral Daily PRN [place] Perch Lodics, CRNP      nicotine  1 patch Transdermal Daily Shan Nissen, CRNP      OLANZapine  2 5 mg Oral Q8H PRN Shan Nissen, CRNP      OLANZapine  5 mg Oral Q3H PRN Shan Nissen, CRNP      OLANZapine  7 5 mg Oral Q3H PRN Shan Nissen, CRNP      OLANZapine  10 mg Intramuscular Q3H PRN Shan Nissen, CRNP      OLANZapine  5 mg Intramuscular Q3H PRN Xin JARED Gutiérrez      OLANZapine  10 mg Oral HS Iqra Caal MD      OLANZapine  15 mg Oral Daily Ty Batista MD      Pramox-PE-Glycerin-Petrolatum  1 application Rectal 4x Daily PRN JARED Jones      prazosin  1 mg Oral HS Xin JARED Gutiérrez      psyllium  1 packet Oral Daily JARED Isbell         Risks / Benefits of Treatment:     Risks, benefits, and possible side effects of medications explained to patient and patient verbalizes understanding and agreement for treatment  Counseling / Coordination of Care:     Patient's progress reviewed with nursing staff  Medications, treatment progress and treatment plan reviewed with patient  Supportive counseling provided to the patient          JARED Villalpando

## 2021-05-16 NOTE — NURSING NOTE
Pt denies SI/HI/AH/VH  Pt present in dayroom and milieu  Pt ate 100 % of breakfast and lunch  Bowel movement and shower on 5/16  Pt is calm, cooperative, and social  Pt c/o 5/10 pain related to headache but denied need for pain medication  Pt remains in behavioral control  Will continue to monitor

## 2021-05-16 NOTE — NURSING NOTE
Charlie Rivera has been visible, pleasant, and cooperative  No yelling noted this shift  Patient expressed desire to be discharged and stated she was having a good day  Patient social with select peers  Denies all psych symptoms  She is medication and meal compliant  Consumed 100% of dinner  Patient attended Therapeutic and wrap up group this shift  Continue to monitor

## 2021-05-16 NOTE — PLAN OF CARE
Problem: Risk for Self Injury/Neglect  Goal: Refrain from harming self  Description: Interventions:  - Monitor patient closely, per order  - Develop a trusting relationship  - Supervise medication ingestion, monitor effects and side effects   Outcome: Progressing     Pt taking all medications  No visual or auditory hallucinations noted this shift  Interacting with staff and peers  No complaints of pain or discomfort  Will continue to monitor

## 2021-05-17 PROCEDURE — 99232 SBSQ HOSP IP/OBS MODERATE 35: CPT | Performed by: PSYCHIATRY & NEUROLOGY

## 2021-05-17 RX ADMIN — FENOFIBRATE 145 MG: 145 TABLET ORAL at 08:15

## 2021-05-17 RX ADMIN — LIDOCAINE 1 PATCH: 50 PATCH CUTANEOUS at 08:13

## 2021-05-17 RX ADMIN — ALUMINUM HYDROXIDE, MAGNESIUM HYDROXIDE, AND SIMETHICONE 15 ML: 200; 200; 20 SUSPENSION ORAL at 11:00

## 2021-05-17 RX ADMIN — HALOPERIDOL 2 MG: 1 TABLET ORAL at 12:26

## 2021-05-17 RX ADMIN — LORAZEPAM 0.5 MG: 0.5 TABLET ORAL at 12:26

## 2021-05-17 RX ADMIN — BENZTROPINE MESYLATE 1 MG: 1 TABLET ORAL at 17:27

## 2021-05-17 RX ADMIN — GABAPENTIN 600 MG: 300 CAPSULE ORAL at 08:14

## 2021-05-17 RX ADMIN — LORAZEPAM 0.5 MG: 0.5 TABLET ORAL at 17:26

## 2021-05-17 RX ADMIN — PHENYTOIN 1 MG: 125 SUSPENSION ORAL at 21:11

## 2021-05-17 RX ADMIN — PSYLLIUM HUSK 1 PACKET: 3.4 POWDER ORAL at 08:13

## 2021-05-17 RX ADMIN — BENZTROPINE MESYLATE 1 MG: 1 TABLET ORAL at 08:14

## 2021-05-17 RX ADMIN — ESCITALOPRAM OXALATE 20 MG: 10 TABLET ORAL at 08:14

## 2021-05-17 RX ADMIN — LORAZEPAM 0.5 MG: 0.5 TABLET ORAL at 21:10

## 2021-05-17 RX ADMIN — GABAPENTIN 600 MG: 300 CAPSULE ORAL at 17:27

## 2021-05-17 RX ADMIN — OLANZAPINE 10 MG: 10 TABLET, FILM COATED ORAL at 21:11

## 2021-05-17 RX ADMIN — HALOPERIDOL 2 MG: 1 TABLET ORAL at 08:14

## 2021-05-17 RX ADMIN — DIPHENHYDRAMINE HCL 25 MG: 25 TABLET ORAL at 21:11

## 2021-05-17 RX ADMIN — GABAPENTIN 600 MG: 300 CAPSULE ORAL at 12:26

## 2021-05-17 RX ADMIN — HALOPERIDOL 2 MG: 1 TABLET ORAL at 21:11

## 2021-05-17 RX ADMIN — NICOTINE 1 PATCH: 21 PATCH, EXTENDED RELEASE TRANSDERMAL at 08:13

## 2021-05-17 RX ADMIN — GABAPENTIN 600 MG: 300 CAPSULE ORAL at 21:10

## 2021-05-17 RX ADMIN — LORAZEPAM 0.5 MG: 0.5 TABLET ORAL at 08:14

## 2021-05-17 RX ADMIN — HALOPERIDOL 2 MG: 1 TABLET ORAL at 17:26

## 2021-05-17 RX ADMIN — OLANZAPINE 15 MG: 5 TABLET, FILM COATED ORAL at 08:13

## 2021-05-17 NOTE — NURSING NOTE
Pt is medication and meal compliant  Pt is visible in the milieu using pt phone frequently and sits with peers to watch tv in dining room with peers  Pt has had minimal yelling outbursts, responding to IS, but able to deescalate self in room with out needing prns  Pt able to hold pleasant and polite conversation with no irritability noted  Pt is currently sitting out for fresh air group with peers  Will continue to monitor

## 2021-05-17 NOTE — PROGRESS NOTES
05/16/21 9635   Team Meeting   Meeting Type Daily Rounds   Initial Conference Date 05/14/21   Patient/Family Present   Patient Present No   Patient's Family Present No     Daily Rounds Documentation  Team Members Participating  Dr Xavier Hannah, MD Pinky Stubbs, NEYDA House, DECLANW  Francis Knott, DECLANW  Gillian Farias, ANNALISE    Case reviewed  Irritable yesterday  Attention seeking and had an outburst  Labile and loud at times  Told MHT they were supposed to wake her up  Less requests for PRNs

## 2021-05-17 NOTE — PLAN OF CARE
Problem: Alteration in Thoughts and Perception  Goal: Treatment Goal: Gain control of psychotic behaviors/thinking, reduce/eliminate presenting symptoms and demonstrate improved reality functioning upon discharge  Outcome: Not Progressing  Goal: Verbalize thoughts and feelings  Description: Interventions:  - Promote a nonjudgmental and trusting relationship with the patient through active listening and therapeutic communication  - Assess patient's level of functioning, behavior and potential for risk  - Engage patient in 1 on 1 interactions  - Encourage patient to express fears, feelings, frustrations, and discuss symptoms    - [place] patient to reality, help patient recognize reality-based thinking   - Administer medications as ordered and assess for potential side effects  - Provide the patient education related to the signs and symptoms of the illness and desired effects of prescribed medications  Outcome: Progressing  Goal: Refrain from acting on delusional thinking/internal stimuli  Description: Interventions:  - Monitor patient closely, per order   - Utilize least restrictive measures   - Set reasonable limits, give positive feedback for acceptable   - Administer medications as ordered and monitor of potential side effects  Outcome: Not Progressing  Goal: Agree to be compliant with medication regime, as prescribed and report medication side effects  Description: Interventions:  - Offer appropriate PRN medication and supervise ingestion; conduct AIMS, as needed   Outcome: Progressing  Goal: Attend and participate in unit activities, including therapeutic, recreational, and educational groups  Description: Interventions:  -Encourage Visitation and family involvement in care  Outcome: Progressing  Goal: Recognize dysfunctional thoughts, communicate reality-based thoughts at the time of discharge  Description: Interventions:  - Provide medication and psycho-education to assist patient in compliance and developing insight into his/her illness   Outcome: Not Progressing  Goal: Complete daily ADLs, including personal hygiene independently, as able  Description: Interventions:  - Observe, teach, and assist patient with ADLS  - Monitor and promote a balance of rest/activity, with adequate nutrition and elimination   Outcome: Progressing     Problem: Risk for Self Injury/Neglect  Goal: Treatment Goal: Remain safe during length of stay, learn and adopt new coping skills, and be free of self-injurious ideation, impulses and acts at the time of discharge  Outcome: Progressing  Goal: Verbalize thoughts and feelings  Description: Interventions:  - Assess and re-assess patient's lethality and potential for self-injury  - Engage patient in 1:1 interactions, daily, for a minimum of 15 minutes  - Encourage patient to express feelings, fears, frustrations, hopes  - Establish rapport/trust with patient   Outcome: Progressing  Goal: Refrain from harming self  Description: Interventions:  - Monitor patient closely, per order  - Develop a trusting relationship  - Supervise medication ingestion, monitor effects and side effects   Outcome: Progressing  Goal: Attend and participate in unit activities, including therapeutic, recreational, and educational groups  Description: Interventions:  - Provide therapeutic and educational activities daily, encourage attendance and participation, and document same in the medical record  - Obtain collateral information, encourage visitation and family involvement in care   Outcome: Progressing  Goal: Recognize maladaptive responses and adopt new coping mechanisms  Outcome: Not Progressing  Goal: Complete daily ADLs, including personal hygiene independently, as able  Description: Interventions:  - Observe, teach, and assist patient with ADLS  - Monitor and promote a balance of rest/activity, with adequate nutrition and elimination  Outcome: Progressing     Problem: Depression  Goal: Treatment Goal: Demonstrate behavioral control of depressive symptoms, verbalize feelings of improved mood/affect, and adopt new coping skills prior to discharge  Outcome: Not Progressing  Goal: Verbalize thoughts and feelings  Description: Interventions:  - Assess and re-assess patient's level of risk   - Engage patient in 1:1 interactions, daily, for a minimum of 15 minutes   - Encourage patient to express feelings, fears, frustrations, hopes   Outcome: Progressing  Goal: Refrain from harming self  Description: Interventions:  - Monitor patient closely, per order   - Supervise medication ingestion, monitor effects and side effects   Outcome: Progressing  Goal: Refrain from isolation  Description: Interventions:  - Develop a trusting relationship   - Encourage socialization   Outcome: Not Progressing  Goal: Refrain from self-neglect  Outcome: Progressing  Goal: Attend and participate in unit activities, including therapeutic, recreational, and educational groups  Description: Interventions:  - Provide therapeutic and educational activities daily, encourage attendance and participation, and document same in the medical record   Outcome: Progressing  Goal: Complete daily ADLs, including personal hygiene independently, as able  Description: Interventions:  - Observe, teach, and assist patient with ADLS  -  Monitor and promote a balance of rest/activity, with adequate nutrition and elimination   Outcome: Progressing     Problem: Anxiety  Goal: Anxiety is at manageable level  Description: Interventions:  - Assess and monitor patient's anxiety level  - Monitor for signs and symptoms (heart palpitations, chest pain, shortness of breath, headaches, nausea, feeling jumpy, restlessness, irritable, apprehensive)  - Collaborate with interdisciplinary team and initiate plan and interventions as ordered    - [place] patient to unit/surroundings  - Explain treatment plan  - Encourage participation in care  - Encourage verbalization of concerns/fears  - Identify coping mechanisms  - Assist in developing anxiety-reducing skills  - Administer/offer alternative therapies  - Limit or eliminate stimulants  Outcome: Not Progressing     Problem: Risk for Violence/Aggression Toward Others  Goal: Treatment Goal: Refrain from acts of violence/aggression during length of stay, and demonstrate improved impulse control at the time of discharge  Outcome: Not Progressing  Goal: Verbalize thoughts and feelings  Description: Interventions:  - Assess and re-assess patient's level of risk, every waking shift  - Engage patient in 1:1 interactions, daily, for a minimum of 15 minutes   - Allow patient to express feelings and frustrations in a safe and non-threatening manner   - Establish rapport/trust with patient   Outcome: Progressing  Goal: Refrain from harming others  Outcome: Progressing  Goal: Refrain from destructive acts on the environment or property  Outcome: Progressing  Goal: Control angry outbursts  Description: Interventions:  - Monitor patient closely, per order  - Ensure early verbal de-escalation  - Monitor prn medication needs  - Set reasonable/therapeutic limits, outline behavioral expectations, and consequences   - Provide a non-threatening milieu, utilizing the least restrictive interventions   Outcome: Not Progressing  Goal: Attend and participate in unit activities, including therapeutic, recreational, and educational groups  Description: Interventions:  - Provide therapeutic and educational activities daily, encourage attendance and participation, and document same in the medical record   Outcome: Progressing  Goal: Identify appropriate positive anger management techniques  Description: Interventions:  - Offer anger management and coping skills groups   - Staff will provide positive feedback for appropriate anger control  Outcome: Not Progressing     Problem: Risk for Violence/Aggression Toward Others  Goal: Treatment Goal: Refrain from acts of violence/aggression during length of stay, and demonstrate improved impulse control at the time of discharge  Outcome: Not Progressing  Goal: Verbalize thoughts and feelings  Description: Interventions:  - Assess and re-assess patient's level of risk, every waking shift  - Engage patient in 1:1 interactions, daily, for a minimum of 15 minutes   - Allow patient to express feelings and frustrations in a safe and non-threatening manner   - Establish rapport/trust with patient   Outcome: Progressing  Goal: Refrain from harming others  Outcome: Progressing  Goal: Refrain from destructive acts on the environment or property  Outcome: Progressing  Goal: Control angry outbursts  Description: Interventions:  - Monitor patient closely, per order  - Ensure early verbal de-escalation  - Monitor prn medication needs  - Set reasonable/therapeutic limits, outline behavioral expectations, and consequences   - Provide a non-threatening milieu, utilizing the least restrictive interventions   Outcome: Not Progressing  Goal: Attend and participate in unit activities, including therapeutic, recreational, and educational groups  Description: Interventions:  - Provide therapeutic and educational activities daily, encourage attendance and participation, and document same in the medical record   Outcome: Progressing  Goal: Identify appropriate positive anger management techniques  Description: Interventions:  - Offer anger management and coping skills groups   - Staff will provide positive feedback for appropriate anger control  Outcome: Not Progressing     Problem: Alteration in Orientation  Goal: Treatment Goal: Demonstrate a reduction of confusion and improved orientation to person, place, time and/or situation upon discharge, according to optimum baseline/ability  Outcome: Progressing  Goal: Interact with staff daily  Description: Interventions:  - Assess and re-assess patient's level of orientation  - Engage patient in 1 on 1 interactions, daily, for a minimum of 15 minutes   - Establish rapport/trust with patient   Outcome: Progressing  Goal: Express concerns related to confused thinking related to:  Description: Interventions:  - Encourage patient to express feelings, fears, frustrations, hopes  - Assign consistent caregivers   - [place]/re-orient patient as needed  - Allow comfort items, as appropriate  - Provide visual cues, signs, etc    Outcome: Not Progressing  Goal: Allow medical examinations, as recommended  Description: Interventions:  - Provide physical/neurological exams and/or referrals, per provider   Outcome: Progressing  Goal: Cooperate with recommended testing/procedures  Description: Interventions:  - Determine need for ancillary testing  - Observe for mental status changes  - Implement falls/precaution protocol   Outcome: Progressing  Goal: Attend and participate in unit activities, including therapeutic, recreational, and educational groups  Description: Interventions:  - Provide therapeutic and educational activities daily, encourage attendance and participation, and document same in the medical record   - Provide appropriate opportunities for reminiscence   - Provide a consistent daily routine   - Encourage family contact/visitation   Outcome: Progressing  Goal: Complete daily ADLs, including personal hygiene independently, as able  Description: Interventions:  - Observe, teach, and assist patient with ADLS  Outcome: Progressing     Problem: Individualized Interventions  Goal: Patient will verbalize appropriate use of telephone within 5 days  Description: Interventions:  - Treatment team to determine use of supervised phone privileges   Outcome: Progressing  Goal: Patient will verbalize need for hospitalization and will no longer attempt elopement within 5 days  Description: Interventions:  - Ongoing education to help patient understand need for hospitalization  Outcome: Progressing  Goal: Patient will recognize inappropriate behaviors and develop alternative behaviors within 5 days  Description: Interventions:  - Patient in collaboration with Treatment Team will develop a behavior management plan to help identify effective coping skills to deal with stressors  Outcome: Not Progressing

## 2021-05-17 NOTE — PROGRESS NOTES
05/17/21 1103   Team Meeting   Meeting Type Daily Rounds   Initial Conference Date 05/17/21   Patient/Family Present   Patient Present No   Patient's Family Present No       Daily Rounds Documentation  Team Members Participating  MD Maura Ace, NEYDA Way, DECLANW  Larry Eddy, LSW  Sushant Moreno, CPS    Case reviewed  Pleasant, cooperative, visible  Social with select peers  Tomorrow is her 51th birthday  5/5 groups Friday  Had a fairly good weekend  Stepped away into observation room to contain her outburst and calm down  Intermittent, though less frequent verbal outbursts that are not usually directed at anyone but described by patient as 'psychosis  '

## 2021-05-17 NOTE — PROGRESS NOTES
Psychiatry Progress Note [facility] 53 y o  female MRN: [id number]  Unit/Bed#: AMBER LOU [facility] 109-01 Encounter: [id number]  Code Status: Level 1 - Full Code    PCP: Alexia Carbajal DO    Date of Admission:  4/7/2021 1435   Date of Service:  05/17/21    Patient Active Problem List   Diagnosis    Schizoaffective disorder, bipolar type ([facility] )    Uncomplicated alcohol dependence ([facility] )    Suicidal behavior    LYNN (generalized anxiety disorder)    Slow transit constipation    Deficiency of vitamin B    Degenerative disc disease, lumbar    Post-traumatic stress disorder, chronic    Lower extremity weakness    Generalized abdominal pain    Non-intractable vomiting    Medical clearance for psychiatric admission    Carbuncle    Alcohol dependence with unspecified alcohol-induced disorder ([facility] )    Mild intermittent asthma without complication    Tobacco abuse    Ear problem, bilateral     Review of systems:       Unremarkable but has needed Tylenol for p r n  headaches  Diagnosis:          Schizoaffective bipolar, alcohol use disorder episodic in early remission in controlled environment  ,Assessment   Overall Status:          Continues to yell and scream at times but less often and more redirectable and did use the quiet room once over the weekend to come herself   Certification Statement: The patient will continue to require additional inpatient hospital stay due to ongoing mood swings paranoia   Acceptance by patient:  accepting    Hopefulness in recovery:  Living in a group home   Understanding of medications: has good understanding   Involved in reintegration process:  Adjusting to the unit   Trusting in relationship with psychiatrist:  trusting   Justification for dual anti-psychotics:  Not applicable   Side effects from treatment: none    Medication changes     Patient agreeing to go back on [place] instead of [place] beginning May 18th his last [place] Ravi Venegas was given on 02/23/2021 as per the act team as 819 mg  Non-pharmacological treatments   Continue with individual, group, milieu and occupational therapy using recovery principles and psycho-education about accepting illness and the need for treatment  Safety   Safety and communication plan established to target dynamic risk factors discussed above  Discharge Plan     most likely to a group home with the act team    Interval Progress      Patient continues to yell and scream but less often and did going to the quiet room on her own 2 nights ago which was affective in coming herself  She is friendly pleasant when approached attending more groups and she is less paranoid about staff doing hypnosis on her  Tends to be less agitated less angry and less labile and not verbally aggressive lately  She is attending more groups and is compliant with medications      Sleep:          good   appetite:         good   compliance with medications :     good   Side  Effects:                  none   significant events:           Delusional about staff doing hypnosis but less often and still tends to scream and yell and again less often   group attendance:       Attending most of the groups    Mental Status Exam  Appearance: age appropriate, casually dressed, adequate grooming, looks older than stated age, underweight     Behavior: normal, pleasant, cooperative, appears anxious,  more friendly today and not confrontational    Speech: fluent, coherent, hypertalkative,   Circumstantial pressured    Mood: depressed, anxious, labile, euphoric  Affect: inappropriate, labile, reactive, slightly brighter  Thought Process: tends to be pressured and circumstantial and preoccupied perseverating on discharge    Thought Content: some paranoia, mild paranoia, ideas of reference, but does appear as if paranoid  No current suicidal homicidal thoughts intent or plans verbalized    No phobias obsessions compulsions or distorted body perceptions elicited  Still questions if the family is hypnotizing her   Perceptual Disturbances: no auditory hallucinations, no visual hallucinations, To claim to hear voices but unable to elaborate what they tell her and claims to feel things are moving around her     Hx Risk Factors: chronic mood disorder, chronic psychotic symptoms, alcohol use, limted insight  Sensorium:          Oriented to 3 spheres and to siuation   Cognition: recent and remote memory grossly intact  Consciousness: alert and awake  Attention: attention span and concentration are age appropriate  Intellect: appears to be of average intelligence  Insight: improving  Judgement: improving  Motor Activity: no abnormal movements     Vitals  Temp:  [97 7 °F (36 5 °C)] 97 7 °F (36 5 °C)  HR:  [84-88] 88  Resp:  [17-20] 20  BP: (106-112)/(57-74) 108/69  SpO2:  [96 %] 96 %  No intake or output data in the 24 hours ending 05/17/21 6393    Lab Results:  [facility] Admission Reviewed     Current Facility-Administered Medications   Medication Dose Route Frequency Provider Last Rate    acetaminophen  650 mg Oral Q6H PRN Venancio Aloe Lodics, CRNP      acetaminophen  650 mg Oral Q4H PRN Venancio Aloe Lodics, CRNP      acetaminophen  975 mg Oral Q6H PRN Isis Porterics, CRNP      al mag oxide-diphenhydramine-lidocaine viscous  10 mL Swish & Swallow Q4H PRN Hesham Pearson, JARED      albuterol  2 puff Inhalation Q6H PRN Isis N Lodics, CRNP      aluminum-magnesium hydroxide-simethicone  15 mL Oral Q4H PRN Jasmyn Deng, RENATANP      benztropine  1 mg Intramuscular Q4H PRN Max 6/day Isis N Lodics, CRNP      benztropine  1 mg Oral Q4H PRN Max 6/day Isis N Lodics, CRNP      benztropine  1 mg Oral BID Ronni Stahl MD      calcium carbonate  500 mg Oral TID PRN Venancio Aloe Lodics, RENATANP      diphenhydrAMINE  25 mg Oral HS Isis N Germanics, CRNP      escitalopram  20 mg Oral Daily JARED Waters fenofibrate  145 mg Oral Daily Isis Lama, CRNP      gabapentin  600 mg Oral 4x Daily Isis Lama, CRNP      haloperidol  2 mg Oral 4x Daily Eze Plata MD      haloperidol  5 mg Oral Q6H PRN Salomon Lama, CRNP      hydrOXYzine HCL  25 mg Oral Q6H PRN Max 4/day Isis Lama, CRNP      hydrOXYzine HCL  50 mg Oral Q6H PRN Max 4/day Isis Lama, CRNP      lidocaine  1 patch Topical Daily Isis Lama, CRNP      LORazepam  1 mg Intramuscular Q6H PRN Eze Plata MD      LORazepam  0 5 mg Oral 4x Daily Eze Plata MD      magnesium hydroxide  30 mL Oral Daily PRN Salomon Lama, CRNP      nicotine  1 patch Transdermal Daily Chan Uribery, CRNP      OLANZapine  2 5 mg Oral Q8H PRN Chan Rickie, CRNP      OLANZapine  5 mg Oral Q3H PRN Chan Rickie, CRNP      OLANZapine  7 5 mg Oral Q3H PRN Chan Rickie, CRNP      OLANZapine  10 mg Intramuscular Q3H PRN Chan Rickie, CRNP      OLANZapine  5 mg Intramuscular Q3H PRN Chan Rickie, CRNP      OLANZapine  10 mg Oral HS Elvin Terrazas MD      OLANZapine  15 mg Oral Daily Elvin Terrazas MD      Pramox-PE-Glycerin-Petrolatum  1 application Rectal 4x Daily PRN Isis Lama, CRNP      prazosin  1 mg Oral HS Chan Damian, JARED      psyllium  1 packet Oral Daily JARED Isbell         Counseling / Coordination of Care: Total floor / unit time spent today 15 minutes  Greater than 50% of total time was spent with the patient and / or family counseling and / or somewhat receptive to supportive listening and teaching positive coping skills to deal with symptom mangement  Patient's Rights, confidentiality and exceptions to confidentiality, use of automated medical record, [facility] staff access to medical record, and consent to treatment reviewed     This note was  not shared with the patient because it might further aggravate her psychiatric condition  This note has been dictated

## 2021-05-18 PROCEDURE — 99232 SBSQ HOSP IP/OBS MODERATE 35: CPT | Performed by: PSYCHIATRY & NEUROLOGY

## 2021-05-18 RX ADMIN — PALIPERIDONE PALMITATE 234 MG: 234 INJECTION INTRAMUSCULAR at 09:30

## 2021-05-18 RX ADMIN — ALUMINUM HYDROXIDE, MAGNESIUM HYDROXIDE, AND SIMETHICONE 15 ML: 200; 200; 20 SUSPENSION ORAL at 10:40

## 2021-05-18 RX ADMIN — ALUMINUM HYDROXIDE, MAGNESIUM HYDROXIDE, AND SIMETHICONE 15 ML: 200; 200; 20 SUSPENSION ORAL at 19:23

## 2021-05-18 RX ADMIN — HALOPERIDOL 2 MG: 1 TABLET ORAL at 08:13

## 2021-05-18 RX ADMIN — BENZTROPINE MESYLATE 1 MG: 1 TABLET ORAL at 08:13

## 2021-05-18 RX ADMIN — LORAZEPAM 0.5 MG: 0.5 TABLET ORAL at 17:33

## 2021-05-18 RX ADMIN — FENOFIBRATE 145 MG: 145 TABLET ORAL at 08:15

## 2021-05-18 RX ADMIN — LORAZEPAM 0.5 MG: 0.5 TABLET ORAL at 12:13

## 2021-05-18 RX ADMIN — LORAZEPAM 0.5 MG: 0.5 TABLET ORAL at 08:12

## 2021-05-18 RX ADMIN — LIDOCAINE 1 PATCH: 50 PATCH CUTANEOUS at 08:12

## 2021-05-18 RX ADMIN — OLANZAPINE 15 MG: 5 TABLET, FILM COATED ORAL at 08:13

## 2021-05-18 RX ADMIN — HALOPERIDOL 2 MG: 1 TABLET ORAL at 17:33

## 2021-05-18 RX ADMIN — GABAPENTIN 600 MG: 300 CAPSULE ORAL at 12:12

## 2021-05-18 RX ADMIN — NICOTINE 1 PATCH: 21 PATCH, EXTENDED RELEASE TRANSDERMAL at 08:13

## 2021-05-18 RX ADMIN — HALOPERIDOL 2 MG: 1 TABLET ORAL at 12:12

## 2021-05-18 RX ADMIN — ESCITALOPRAM OXALATE 20 MG: 10 TABLET ORAL at 08:13

## 2021-05-18 RX ADMIN — GABAPENTIN 600 MG: 300 CAPSULE ORAL at 08:12

## 2021-05-18 RX ADMIN — ALUMINUM HYDROXIDE, MAGNESIUM HYDROXIDE, AND SIMETHICONE 15 ML: 200; 200; 20 SUSPENSION ORAL at 15:17

## 2021-05-18 RX ADMIN — BENZTROPINE MESYLATE 1 MG: 1 TABLET ORAL at 17:33

## 2021-05-18 RX ADMIN — GABAPENTIN 600 MG: 300 CAPSULE ORAL at 17:32

## 2021-05-18 RX ADMIN — ACETAMINOPHEN 975 MG: 325 TABLET ORAL at 12:59

## 2021-05-18 RX ADMIN — PSYLLIUM HUSK 1 PACKET: 3.4 POWDER ORAL at 08:12

## 2021-05-18 NOTE — SOCIAL WORK
Assisting patient with connecting via teams with her daughter for video visit  Having technological difficulties on her daughter's end with accessing MS teams  Ended up connecting and pt able to have substantial video visit she was pleased with  Patient has been pleasant and calm today in our encounters  She received flowers from SW for her birthday and appeared to enjoy celebrating it with peers, though quiet and more withdrawn in [place]  No outbursts observed by SETH thus far today

## 2021-05-18 NOTE — PLAN OF CARE
Problem: Alteration in Thoughts and Perception  Goal: Verbalize thoughts and feelings  Description: Interventions:  - Promote a nonjudgmental and trusting relationship with the patient through active listening and therapeutic communication  - Assess patient's level of functioning, behavior and potential for risk  - Engage patient in 1 on 1 interactions  - Encourage patient to express fears, feelings, frustrations, and discuss symptoms    - [place] patient to reality, help patient recognize reality-based thinking   - Administer medications as ordered and assess for potential side effects  - Provide the patient education related to the signs and symptoms of the illness and desired effects of prescribed medications  Outcome: Progressing     Problem: Ineffective Coping  Goal: Identifies ineffective coping skills  Outcome: Progressing  Goal: Identifies healthy coping skills  Outcome: Progressing  Goal: Participates in unit activities  Description: Interventions:  - Provide therapeutic environment   - Provide required programming   - Redirect inappropriate behaviors   Outcome: Progressing    Writer met with Patient and completed BH Relapse Prevention plan  Patient identified warning signs that she may need help as stopping eating, starting to consume alcohol and loss of interest and communication with her community supports/friends  Positive coping skills that assist Patient are journaling, coloring and light exercise  Patient's support persons in the community are her best friend Quita Julio, her Daughter Devon Giron and her professional support staff  Patient is aware she needs to contact her community supports and/or Doctor if she begins to experience increased voices, suicidal ideations and/or feels extreme uncontrollable sudheer  Patient signed document and copy was given for D/C  See D/C plan for follow up appointments and provider phone numbers

## 2021-05-18 NOTE — NURSING NOTE
Pt is medication and meal compliant  Pt is visible in the milieu walking and socializing with select peers  Pt has been pleasant and polite in conversation with minimal yelling out  Pt denies s/s currently and continues to walk the hallway  Pt has been elated regarding birthday today asking peers "how old do you think I am today"  Pt informed of needed room change, verbalized understanding and remained pleasant  Pt then entered room to gather belongings and began yelling out entering bathroom to regain control  Will continue to monitor

## 2021-05-18 NOTE — PROGRESS NOTES
05/18/21 1100   Activity/Group Checklist   Group Community meeting   Attendance Attended   Attendance Duration (min) 46-60   Interactions Interacted appropriately   Affect/Mood Appropriate;Calm;Constricted   Goals Achieved Able to listen to others; Able to engage in interactions; Able to self-disclose; Able to recieve feedback; Able to give feedback to another

## 2021-05-18 NOTE — NURSING NOTE
Patient is visible and for the most part, calm  There were several episodes of screaming/yelling, in her room but they were of short duration and she was able to be redirected  Patient c/o indigestion and received Mylanta at 1923 with desired effect  When she was screaming in her room she intimated that someone was"doing this to her" and that she was in pain   She said "get out of my room"  Later, she apologized saying "i'm sorry for yelling"

## 2021-05-18 NOTE — PROGRESS NOTES
05/18/21 1331   Team Meeting   Meeting Type Daily Rounds   Initial Conference Date 05/18/21   Patient/Family Present   Patient Present No   Patient's Family Present No       Daily Rounds Documentation  Team Members Participating  Dr Bonni Kelch, MD Cherylene Husband, RN  Cristine Lu, DECLANW  Evelyne Pettit, MARK ANTHNOY Martinez, ANNALISE    Case reviewed  5/5 groups  Minor Matters today

## 2021-05-18 NOTE — NURSING NOTE
Pt received invega sustenna injection to left ventro gluteal region, tolerated well  Will continue to monitor

## 2021-05-18 NOTE — PROGRESS NOTES
Psychiatry Progress Note [facility] 49 y o  female MRN: [id number]  Unit/Bed#: [facility]BER LOU [facility] 109-01 Encounter: [id number]  Code Status: Level 1 - Full Code    PCP: Lefty Birch DO    Date of Admission:  4/7/2021 1435   Date of Service:  05/18/21    Patient Active Problem List   Diagnosis    [facility]izoaffective disorder, bipolar type ([facility] )    Uncomplicated alcohol dependence ([facility] )    Suicidal behavior    LYNN (generalized anxiety disorder)    Slow transit constipation    Deficiency of vitamin B    Degenerative disc disease, lumbar    Post-traumatic stress disorder, chronic    Lower extremity weakness    Generalized abdominal pain    Non-intractable vomiting    Medical clearance for psychiatric admission    Carbuncle    Alcohol dependence with unspecified alcohol-induced disorder ([facility] )    Mild intermittent asthma without complication    Tobacco abuse    Ear problem, bilateral     Review of systems:      Needed Mylanta for stomach upset otherwise unremarkable  Diagnosis:           Schizoaffective bipolar, alcohol use disorder    Episodic in early remission in controlled environment  ,Assessment   Overall Status:           Again yelled and screamed 1 time in her room and this has markedly decreased still location labile and agitated but more manageable and redirectable   Certification Statement: The patient will continue to require additional inpatient hospital stay due to ongoing mood swings paranoia   Acceptance by patient:  accepting    Hopefulness in recovery:  Living in a group home   Understanding of medications: has good understanding   Involved in reintegration process:  Adjusting to the unit   Trusting in relationship with psychiatrist:  trusting   Justification for dual anti-psychotics:  Not applicable   Side effects from treatment: none    Medication changes     Patient agreeing to go back on [place] instead of [place] beginning May 18th his last Foye Reveal was given on 02/23/2021 as per the act team as 819 mg, Invega sustenna given today as 234 mg IM starting today as q 4 weeks   Non-pharmacological treatments   Continue with individual, group, milieu and occupational therapy using recovery principles and psycho-education about accepting illness and the need for treatment  Safety   Safety and communication plan established to target dynamic risk factors discussed above  Discharge Plan     most likely to a group home with the act team again     Interval Progress       Patient has shown improvement with marked decrease in her yelling and screaming appease thoughts as she was reportedly doing it only once in her home yesterday  She has been attending more groups  Today is her 14th birthday and is happy about it  She remains friendly pleasant when approached and is polite but continues to express some paranoia about staff doing hypnosis and she understands it could be part of her psychosis  She still is agitated less angry and less labile and not verbally aggressive lately and is attending more groups  Patient reports no yelling or screaming at least for last 3 days per her!      Sleep:         good   appetite:          good   compliance with medications :    good   Side  Effects:                  None reported today    significant events:            Occasional yelling and screaming and still delusional but overall doing better   group attendance:        Attending most of the groups    Mental Status Exam  Appearance: age appropriate, casually dressed, adequate grooming, looks older than stated age, underweight  Well groomed, happy about her birthday today    Behavior: normal, pleasant, cooperative, appears anxious,  more friendly today and not confrontational    Speech: fluent, clear, coherent, increased rate, hypertalkative,   Circumstantial pressured    Mood: depressed, anxious, labile, euphoric  Affect: inappropriate, labile, reactive, slightly brighter  Thought Process: tends to be pressured and circumstantial and preoccupied perseverating on discharge    Thought Content: some paranoia, mild paranoia, ideas of reference, but does appear as if paranoid  No current suicidal homicidal thoughts intent or plans verbalized  No phobias obsessions compulsions or distorted body perceptions elicited  Perceptual Disturbances: no auditory hallucinations, no visual hallucinations, To claim to hear voices but unable to elaborate what they tell her and claims to feel things are moving around her     Hx Risk Factors: chronic mood disorder, chronic psychotic symptoms, alcohol use, limted insight  Sensorium:        Oriented to 3 spheres and to situation   Cognition: recent and remote memory grossly intact  Consciousness: alert and awake  Attention: attention span and concentration are age appropriate  Intellect: appears to be of average intelligence  Insight: improving  Judgement: improving  Motor Activity: no abnormal movements     Vitals  Temp:  [97 5 °F (36 4 °C)-97 9 °F (36 6 °C)] 97 5 °F (36 4 °C)  HR:  [80-97] 86  Resp:  [18] 18  BP: (100-119)/(58-77) 105/58  No intake or output data in the 24 hours ending 05/18/21 0548    Lab Results:  [facility] Admission Reviewed     Current Facility-Administered Medications   Medication Dose Route Frequency Provider Last Rate    acetaminophen  650 mg Oral Q6H PRN RENATA SoteloNP      acetaminophen  650 mg Oral Q4H PRN RENATA SoteloNP      acetaminophen  975 mg Oral Q6H PRN JARED Jones      al mag oxide-diphenhydramine-lidocaine viscous  10 mL Swish & Swallow Q4H PRN JARED Castillo      albuterol  2 puff Inhalation Q6H PRN JARED Jones      aluminum-magnesium hydroxide-simethicone  15 mL Oral Q4H PRN Jasmyn Deng, JARED      benztropine  1 mg Intramuscular Q4H PRN Max 6/day JARED Jones      benztropine  1 mg Oral Q4H PRN Max 6/day Lovena Harvey Lama, CRNP      benztropine  1 mg Oral BID Nhung Aly MD      calcium carbonate  500 mg Oral TID PRN Eloise Lama, CRNP      diphenhydrAMINE  25 mg Oral HS Isis Lama, CRNP      escitalopram  20 mg Oral Daily Isis Lama, CRNP      fenofibrate  145 mg Oral Daily Isis Lama, CRNP      gabapentin  600 mg Oral 4x Daily Isis Lama, CRNP      haloperidol  2 mg Oral 4x Daily Ranjeet Castrejon MD      haloperidol  5 mg Oral Q6H PRN Eloise Lama, CRNP      hydrOXYzine HCL  25 mg Oral Q6H PRN Max 4/day Isis Lama, CRNP      hydrOXYzine HCL  50 mg Oral Q6H PRN Max 4/day Isis Lama, CRNP      lidocaine  1 patch Topical Daily Isis Lama, CRNP      LORazepam  1 mg Intramuscular Q6H PRN Ranjeet Castrejon MD      LORazepam  0 5 mg Oral 4x Daily Ranjeet Castrejon MD      magnesium hydroxide  30 mL Oral Daily PRN Eloise Lama, CRNP      nicotine  1 patch Transdermal Daily Jessica Tapia, RENATANP      OLANZapine  2 5 mg Oral Q8H PRN Jessica Tapia, CRNP      OLANZapine  5 mg Oral Q3H PRN Jessica Tapia, CRNP      OLANZapine  7 5 mg Oral Q3H PRN Jessica Taipa, CRNP      OLANZapine  10 mg Intramuscular Q3H PRN Jessica Tapia, CRNP      OLANZapine  5 mg Intramuscular Q3H PRN Jessica Tapia, RENATANP      OLANZapine  10 mg Oral HS Iqra Caal MD      OLANZapine  15 mg Oral Daily Starleen Crigler, MD      paliperidone palmitate ER  234 mg Intramuscular Q30 Days Ranjeet Castrejon MD      Pramox-PE-Glycerin-Petrolatum  1 application Rectal 4x Daily PRN Eloise Lama, JARED      prazosin  1 mg Oral HS Jessica Tapia, JARED      psyllium  1 packet Oral Daily JAERD Isbell         Counseling / Coordination of Care: Total floor / unit time spent today 15 minutes   Greater than 50% of total time was spent with the patient and / or family counseling and / or somewhat receptive to supportive listening and teaching positive coping skills to deal with symptom mangement  Patient's Rights, confidentiality and exceptions to confidentiality, use of automated medical record, [facility] staff access to medical record, and consent to treatment reviewed  This note was  not shared with the patient because it might further aggravate her psychiatric condition  This note has been dictated

## 2021-05-19 PROCEDURE — 99232 SBSQ HOSP IP/OBS MODERATE 35: CPT | Performed by: PSYCHIATRY & NEUROLOGY

## 2021-05-19 RX ADMIN — FENOFIBRATE 145 MG: 145 TABLET ORAL at 08:56

## 2021-05-19 RX ADMIN — BENZTROPINE MESYLATE 1 MG: 1 TABLET ORAL at 14:32

## 2021-05-19 RX ADMIN — DIPHENHYDRAMINE HCL 25 MG: 25 TABLET ORAL at 21:11

## 2021-05-19 RX ADMIN — LORAZEPAM 0.5 MG: 0.5 TABLET ORAL at 17:04

## 2021-05-19 RX ADMIN — ALUMINUM HYDROXIDE, MAGNESIUM HYDROXIDE, AND SIMETHICONE 15 ML: 200; 200; 20 SUSPENSION ORAL at 17:04

## 2021-05-19 RX ADMIN — PSYLLIUM HUSK 1 PACKET: 3.4 POWDER ORAL at 08:52

## 2021-05-19 RX ADMIN — BENZTROPINE MESYLATE 1 MG: 1 TABLET ORAL at 08:51

## 2021-05-19 RX ADMIN — GABAPENTIN 600 MG: 300 CAPSULE ORAL at 08:52

## 2021-05-19 RX ADMIN — ALUMINUM HYDROXIDE, MAGNESIUM HYDROXIDE, AND SIMETHICONE 15 ML: 200; 200; 20 SUSPENSION ORAL at 10:48

## 2021-05-19 RX ADMIN — LORAZEPAM 0.5 MG: 0.5 TABLET ORAL at 21:10

## 2021-05-19 RX ADMIN — PHENYTOIN 1 MG: 125 SUSPENSION ORAL at 21:10

## 2021-05-19 RX ADMIN — LIDOCAINE 1 PATCH: 50 PATCH CUTANEOUS at 08:52

## 2021-05-19 RX ADMIN — BENZTROPINE MESYLATE 1 MG: 1 TABLET ORAL at 17:04

## 2021-05-19 RX ADMIN — HALOPERIDOL 2 MG: 1 TABLET ORAL at 17:04

## 2021-05-19 RX ADMIN — HALOPERIDOL 2 MG: 1 TABLET ORAL at 21:11

## 2021-05-19 RX ADMIN — LORAZEPAM 0.5 MG: 0.5 TABLET ORAL at 12:04

## 2021-05-19 RX ADMIN — NICOTINE 1 PATCH: 21 PATCH, EXTENDED RELEASE TRANSDERMAL at 08:56

## 2021-05-19 RX ADMIN — LORAZEPAM 0.5 MG: 0.5 TABLET ORAL at 08:51

## 2021-05-19 RX ADMIN — HALOPERIDOL 2 MG: 1 TABLET ORAL at 12:05

## 2021-05-19 RX ADMIN — ESCITALOPRAM OXALATE 20 MG: 10 TABLET ORAL at 08:52

## 2021-05-19 RX ADMIN — GABAPENTIN 600 MG: 300 CAPSULE ORAL at 21:10

## 2021-05-19 RX ADMIN — OLANZAPINE 10 MG: 10 TABLET, FILM COATED ORAL at 21:10

## 2021-05-19 RX ADMIN — GABAPENTIN 600 MG: 300 CAPSULE ORAL at 17:04

## 2021-05-19 RX ADMIN — GABAPENTIN 600 MG: 300 CAPSULE ORAL at 12:05

## 2021-05-19 RX ADMIN — HALOPERIDOL 2 MG: 1 TABLET ORAL at 08:53

## 2021-05-19 RX ADMIN — OLANZAPINE 15 MG: 5 TABLET, FILM COATED ORAL at 08:51

## 2021-05-19 NOTE — NURSING NOTE
Alert, cooperative and visible intermittently  No SI or HI noted  Denies depression, anxiety and pain  Pt refused shower this shift  Consumed 100% of breakfast and 100% of lunch  Took all medication without prompting  Maintained on safe precautions without incident   Will continue to monitor progress and recovery program

## 2021-05-19 NOTE — NURSING NOTE
Pt having complaint of tense muscles in legs and requested cogentin  PRN cogentin administered @ 9137

## 2021-05-19 NOTE — NURSING NOTE
Teresa Wilson has been visible, pleasant, and cooperative  No yelling noted this shift  Patient social with select peers  Denies all psych symptoms  She is medication and meal compliant  Consumed 100% of dinner  Patient attended Therapeutic and wrap up group this shift  Continue to monitor

## 2021-05-19 NOTE — PLAN OF CARE
Problem: Alteration in Thoughts and Perception  Goal: Verbalize thoughts and feelings  Description: Interventions:  - Promote a nonjudgmental and trusting relationship with the patient through active listening and therapeutic communication  - Assess patient's level of functioning, behavior and potential for risk  - Engage patient in 1 on 1 interactions  - Encourage patient to express fears, feelings, frustrations, and discuss symptoms    - [place] patient to reality, help patient recognize reality-based thinking   - Administer medications as ordered and assess for potential side effects  - Provide the patient education related to the signs and symptoms of the illness and desired effects of prescribed medications  Outcome: Progressing  Goal: Agree to be compliant with medication regime, as prescribed and report medication side effects  Description: Interventions:  - Offer appropriate PRN medication and supervise ingestion; conduct AIMS, as needed   Outcome: Progressing  Goal: Attend and participate in unit activities, including therapeutic, recreational, and educational groups  Description: Interventions:  -Encourage Visitation and family involvement in care  Outcome: Progressing     Problem: Risk for Self Injury/Neglect  Goal: Verbalize thoughts and feelings  Description: Interventions:  - Assess and re-assess patient's lethality and potential for self-injury  - Engage patient in 1:1 interactions, daily, for a minimum of 15 minutes  - Encourage patient to express feelings, fears, frustrations, hopes  - Establish rapport/trust with patient   Outcome: Progressing

## 2021-05-19 NOTE — PROGRESS NOTES
Progress Note - Behavioral Health   Sincere Perdomo 49 y o  female MRN: [id number]  Unit/Bed#: [facility] 112-01 Encounter: [id number]    Assessment/Plan   Principal Problem:    Schizoaffective disorder, bipolar type ([address] )  Active Problems:    Post-traumatic stress disorder, chronic    Medical clearance for psychiatric admission    Mild intermittent asthma without complication    Ear problem, bilateral      Subjective:Patient was seen today for continuation of care, records reviewed and  patient was discussed with the morning case review team     Harris seen today for psychiatric follow-up  She is calm, cooperative, and pleasant in conversation  She reports a decrease in outbursts, decrease in irritability  She states she remembers having an outburst yesterday, but cannot recall why  She denies auditory and visual hallucinations, stating that the voices are "gone" and is happy with her improvement and her medication regime  She is happy to be receiving the [place] [facility]  Denies medication side effects  She denies depression and anxiety  Denies suicidal and homicidal ideation  Reports good sleep, good appetite  Appropriate in conversation, does not endorse signs or symptoms of sudheer or psychosis at this time      Psychiatric Review of Systems:    Sleep: normal  Appetite: normal  Medication side effects: No   ROS: no complaints, all other systems are negative    Vitals:  Vitals:    05/19/21 0700   BP: 108/72   Pulse: 80   Resp: 17   Temp: 97 6 °F (36 4 °C)   SpO2:        Mental Status Evaluation:    Appearance:  age appropriate, casually dressed, dressed appropriately   Behavior:  pleasant, cooperative, calm   Speech:  normal rate, normal volume, normal pitch   Mood:  improved, euthymic   Affect:  appropriate, mood-congruent   Thought Process:  organized, logical, coherent   Associations: intact associations   Thought Content:  no overt delusions   Perceptual Disturbances: none   Risk Potential: Suicidal ideation - None  Homicidal ideation - None  Potential for aggression - No   Sensorium:  oriented to person, place and time/date   Memory:  recent and remote memory grossly intact   Consciousness:  alert and awake   Attention: attention span and concentration appear shorter than expected for age   Insight:  improving and limited   Judgment: improving and limited   Gait/Station: normal gait/station   Motor Activity: no abnormal movements     Laboratory results:    I have personally reviewed all pertinent laboratory/tests results  Most Recent Labs:   Lab Results   Component Value Date    WBC 6 80 04/09/2021    RBC 4 32 04/09/2021    HGB 13 0 04/09/2021    HCT 39 5 04/09/2021     04/09/2021    RDW 14 3 04/09/2021    NEUTROABS 7 10 (H) 03/16/2021    SODIUM 139 05/03/2021    K 4 4 05/03/2021     05/03/2021    CO2 30 05/03/2021    BUN 14 05/03/2021    CREATININE 0 61 05/03/2021    GLUC 94 05/03/2021    GLUF 94 05/03/2021    CALCIUM 9 8 05/03/2021    AST 25 05/03/2021    ALT 18 05/03/2021    ALKPHOS 56 05/03/2021    TP 7 2 05/03/2021    ALB 4 2 05/03/2021    TBILI 0 25 05/03/2021    CHOLESTEROL 300 (H) 03/31/2021    HDL 57 03/31/2021    TRIG 658 (H) 03/31/2021    LDLCALC  03/31/2021      Comment:      Calculated LDL invalid, triglycerides >400 mg/dl    NONHDLC 147 08/23/2020    AMMONIA 28 10/27/2017    [id number] 3 810 04/09/2021    FREET4 0 89 03/24/2016    PREGUR negative 03/16/2021    PREGSERUM Negative 10/21/2019    HCG <2 00 04/10/2014    RPR Non-Reactive 03/31/2021    HGBA1C 5 0 08/06/2019    EAG 97 08/06/2019       Progress Toward Goals:     Progressing  Denies signs and symptoms of psychosis  Medication compliant, no side effects noted  Discharge planning is ongoing      Recommended Treatment:     All current active medications have been reviewed  Encourage group therapy, milieu therapy and occupational therapy  Behavioral Health checks every 7 minutes  Continue current medications:  Current Facility-Administered Medications   Medication Dose Route Frequency Provider Last Rate    acetaminophen  650 mg Oral Q6H PRN Clay Lama, CRNP      acetaminophen  650 mg Oral Q4H PRN Clay Lama, CRNP      acetaminophen  975 mg Oral Q6H PRN Clay Lama, CRNP      al mag oxide-diphenhydramine-lidocaine viscous  10 mL Swish & Swallow Q4H PRN Sonido Estrada, CRNP      albuterol  2 puff Inhalation Q6H PRN Isis Lama, CRNP      aluminum-magnesium hydroxide-simethicone  15 mL Oral Q4H PRN Jasmyn Deng, CRNP      benztropine  1 mg Intramuscular Q4H PRN Max 6/day Isis Lama, CRNP      benztropine  1 mg Oral Q4H PRN Max 6/day Isis Lama, CRNP      benztropine  1 mg Oral BID Lisset Madison MD      calcium carbonate  500 mg Oral TID PRN Clay Lama, CRNP      diphenhydrAMINE  25 mg Oral HS Isis Lama, CRNP      escitalopram  20 mg Oral Daily Isis Lama, CRNP      fenofibrate  145 mg Oral Daily Isis Lama, CRNP      gabapentin  600 mg Oral 4x Daily Isis Lama, CRNP      haloperidol  2 mg Oral 4x Daily Jenna Lind MD      haloperidol  5 mg Oral Q6H PRN Isis Lama, CRNP      hydrOXYzine HCL  25 mg Oral Q6H PRN Max 4/day Isis Lama, CRNP      hydrOXYzine HCL  50 mg Oral Q6H PRN Max 4/day Isis Lama, CRNP      lidocaine  1 patch Topical Daily Isis Lama, CRNP      LORazepam  1 mg Intramuscular Q6H PRN Jenna Lind MD      LORazepam  0 5 mg Oral 4x Daily Jenna Lind MD      magnesium hydroxide  30 mL Oral Daily PRN Clay Lama, CRNP      nicotine  1 patch Transdermal Daily Raquel Cooler, CRNP      OLANZapine  2 5 mg Oral Q8H PRN Raquel Cooler, CRNP      OLANZapine  5 mg Oral Q3H PRN Raquel Cooler, CRNP      OLANZapine  7 5 mg Oral Q3H PRN Raquel Cooler, CRNP      OLANZapine  10 mg Intramuscular Q3H PRN Raquel Cooler, CRNP      OLANZapine  5 mg Intramuscular Q3H PRN Newton Peterson JAERD Escalante      OLANZapine  10 mg Oral HS Onelia Gary MD      OLANZapine  15 mg Oral Daily Onelia Gary MD      paliperidone palmitate ER  234 mg Intramuscular Q30 Days Arcenio Bañuelos MD      Pramox-PE-Glycerin-Petrolatum  1 application Rectal 4x Daily PRN JARED Jones      prazosin  1 mg Oral HS JARED Broderick      psyllium  1 packet Oral Daily JARED Isbell         Risks / Benefits of Treatment:     Risks, benefits, and possible side effects of medications explained to patient and patient verbalizes understanding and agreement for treatment  Counseling / Coordination of Care:     Patient's progress reviewed with nursing staff  Medications, treatment progress and treatment plan reviewed with patient  Supportive counseling provided to the patient      Layne Mohs, CRNP

## 2021-05-19 NOTE — PLAN OF CARE
Problem: Alteration in Thoughts and Perception  Goal: Treatment Goal: Gain control of psychotic behaviors/thinking, reduce/eliminate presenting symptoms and demonstrate improved reality functioning upon discharge  Outcome: Progressing  Goal: Verbalize thoughts and feelings  Description: Interventions:  - Promote a nonjudgmental and trusting relationship with the patient through active listening and therapeutic communication  - Assess patient's level of functioning, behavior and potential for risk  - Engage patient in 1 on 1 interactions  - Encourage patient to express fears, feelings, frustrations, and discuss symptoms    - [place] patient to reality, help patient recognize reality-based thinking   - Administer medications as ordered and assess for potential side effects  - Provide the patient education related to the signs and symptoms of the illness and desired effects of prescribed medications  Outcome: Progressing  Goal: Refrain from acting on delusional thinking/internal stimuli  Description: Interventions:  - Monitor patient closely, per order   - Utilize least restrictive measures   - Set reasonable limits, give positive feedback for acceptable   - Administer medications as ordered and monitor of potential side effects  Outcome: Not Progressing  Goal: Agree to be compliant with medication regime, as prescribed and report medication side effects  Description: Interventions:  - Offer appropriate PRN medication and supervise ingestion; conduct AIMS, as needed   Outcome: Progressing  Goal: Attend and participate in unit activities, including therapeutic, recreational, and educational groups  Description: Interventions:  -Encourage Visitation and family involvement in care  Outcome: Progressing  Goal: Recognize dysfunctional thoughts, communicate reality-based thoughts at the time of discharge  Description: Interventions:  - Provide medication and psycho-education to assist patient in compliance and developing insight into his/her illness   Outcome: Not Progressing  Goal: Complete daily ADLs, including personal hygiene independently, as able  Description: Interventions:  - Observe, teach, and assist patient with ADLS  - Monitor and promote a balance of rest/activity, with adequate nutrition and elimination   Outcome: Progressing     Problem: Ineffective Coping  Goal: Cooperates with admission process  Description: Interventions:   - Complete admission process  Outcome: Progressing  Goal: Identifies ineffective coping skills  Outcome: Progressing  Goal: Identifies healthy coping skills  Outcome: Progressing  Goal: Demonstrates healthy coping skills  Outcome: Not Progressing  Goal: Participates in unit activities  Description: Interventions:  - Provide therapeutic environment   - Provide required programming   - Redirect inappropriate behaviors   Outcome: Progressing  Goal: Patient/Family participate in treatment and DC plans  Description: Interventions:  - Provide therapeutic environment  Outcome: Progressing  Goal: Patient/Family verbalizes awareness of resources  Outcome: Progressing  Goal: Understands least restrictive measures  Description: Interventions:  - Utilize least restrictive behavior  Outcome: Progressing  Goal: Free from restraint events  Description: - Utilize least restrictive measures   - Provide behavioral interventions   - Redirect inappropriate behaviors   Outcome: Progressing     Problem: Risk for Self Injury/Neglect  Goal: Treatment Goal: Remain safe during length of stay, learn and adopt new coping skills, and be free of self-injurious ideation, impulses and acts at the time of discharge  Outcome: Progressing  Goal: Verbalize thoughts and feelings  Description: Interventions:  - Assess and re-assess patient's lethality and potential for self-injury  - Engage patient in 1:1 interactions, daily, for a minimum of 15 minutes  - Encourage patient to express feelings, fears, frustrations, hopes  - Establish rapport/trust with patient   Outcome: Progressing  Goal: Refrain from harming self  Description: Interventions:  - Monitor patient closely, per order  - Develop a trusting relationship  - Supervise medication ingestion, monitor effects and side effects   Outcome: Progressing  Goal: Attend and participate in unit activities, including therapeutic, recreational, and educational groups  Description: Interventions:  - Provide therapeutic and educational activities daily, encourage attendance and participation, and document same in the medical record  - Obtain collateral information, encourage visitation and family involvement in care   Outcome: Progressing  Goal: Recognize maladaptive responses and adopt new coping mechanisms  Outcome: Not Progressing  Goal: Complete daily ADLs, including personal hygiene independently, as able  Description: Interventions:  - Observe, teach, and assist patient with ADLS  - Monitor and promote a balance of rest/activity, with adequate nutrition and elimination  Outcome: Progressing     Problem: Depression  Goal: Treatment Goal: Demonstrate behavioral control of depressive symptoms, verbalize feelings of improved mood/affect, and adopt new coping skills prior to discharge  Outcome: Progressing  Goal: Verbalize thoughts and feelings  Description: Interventions:  - Assess and re-assess patient's level of risk   - Engage patient in 1:1 interactions, daily, for a minimum of 15 minutes   - Encourage patient to express feelings, fears, frustrations, hopes   Outcome: Progressing  Goal: Refrain from harming self  Description: Interventions:  - Monitor patient closely, per order   - Supervise medication ingestion, monitor effects and side effects   Outcome: Progressing  Goal: Refrain from isolation  Description: Interventions:  - Develop a trusting relationship   - Encourage socialization   Outcome: Progressing  Goal: Refrain from self-neglect  Outcome: Progressing  Goal: Attend and participate in unit activities, including therapeutic, recreational, and educational groups  Description: Interventions:  - Provide therapeutic and educational activities daily, encourage attendance and participation, and document same in the medical record   Outcome: Progressing  Goal: Complete daily ADLs, including personal hygiene independently, as able  Description: Interventions:  - Observe, teach, and assist patient with ADLS  -  Monitor and promote a balance of rest/activity, with adequate nutrition and elimination   Outcome: Progressing     Problem: Anxiety  Goal: Anxiety is at manageable level  Description: Interventions:  - Assess and monitor patient's anxiety level  - Monitor for signs and symptoms (heart palpitations, chest pain, shortness of breath, headaches, nausea, feeling jumpy, restlessness, irritable, apprehensive)  - Collaborate with interdisciplinary team and initiate plan and interventions as ordered    - [place] patient to unit/surroundings  - Explain treatment plan  - Encourage participation in care  - Encourage verbalization of concerns/fears  - Identify coping mechanisms  - Assist in developing anxiety-reducing skills  - Administer/offer alternative therapies  - Limit or eliminate stimulants  Outcome: Not Progressing     Problem: Risk for Violence/Aggression Toward Others  Goal: Treatment Goal: Refrain from acts of violence/aggression during length of stay, and demonstrate improved impulse control at the time of discharge  Outcome: Progressing  Goal: Verbalize thoughts and feelings  Description: Interventions:  - Assess and re-assess patient's level of risk, every waking shift  - Engage patient in 1:1 interactions, daily, for a minimum of 15 minutes   - Allow patient to express feelings and frustrations in a safe and non-threatening manner   - Establish rapport/trust with patient   Outcome: Progressing  Goal: Refrain from harming others  Outcome: Progressing  Goal: Refrain from destructive acts on the environment or property  Outcome: Progressing  Goal: Control angry outbursts  Description: Interventions:  - Monitor patient closely, per order  - Ensure early verbal de-escalation  - Monitor prn medication needs  - Set reasonable/therapeutic limits, outline behavioral expectations, and consequences   - Provide a non-threatening milieu, utilizing the least restrictive interventions   Outcome: Not Progressing  Goal: Attend and participate in unit activities, including therapeutic, recreational, and educational groups  Description: Interventions:  - Provide therapeutic and educational activities daily, encourage attendance and participation, and document same in the medical record   Outcome: Progressing  Goal: Identify appropriate positive anger management techniques  Description: Interventions:  - Offer anger management and coping skills groups   - Staff will provide positive feedback for appropriate anger control  Outcome: Not Progressing     Problem: Alteration in Orientation  Goal: Treatment Goal: Demonstrate a reduction of confusion and improved orientation to person, place, time and/or situation upon discharge, according to optimum baseline/ability  Outcome: Progressing  Goal: Interact with staff daily  Description: Interventions:  - Assess and re-assess patient's level of orientation  - Engage patient in 1 on 1 interactions, daily, for a minimum of 15 minutes   - Establish rapport/trust with patient   Outcome: Progressing  Goal: Express concerns related to confused thinking related to:  Description: Interventions:  - Encourage patient to express feelings, fears, frustrations, hopes  - Assign consistent caregivers   - [place]/re-orient patient as needed  - Allow comfort items, as appropriate  - Provide visual cues, signs, etc    Outcome: Progressing  Goal: Allow medical examinations, as recommended  Description: Interventions:  - Provide physical/neurological exams and/or referrals, per provider   Outcome: Progressing  Goal: Cooperate with recommended testing/procedures  Description: Interventions:  - Determine need for ancillary testing  - Observe for mental status changes  - Implement falls/precaution protocol   Outcome: Progressing  Goal: Attend and participate in unit activities, including therapeutic, recreational, and educational groups  Description: Interventions:  - Provide therapeutic and educational activities daily, encourage attendance and participation, and document same in the medical record   - Provide appropriate opportunities for reminiscence   - Provide a consistent daily routine   - Encourage family contact/visitation   Outcome: Progressing  Goal: Complete daily ADLs, including personal hygiene independently, as able  Description: Interventions:  - Observe, teach, and assist patient with ADLS  Outcome: Progressing     Problem: Individualized Interventions  Goal: Patient will verbalize appropriate use of telephone within 5 days  Description: Interventions:  - Treatment team to determine use of supervised phone privileges   Outcome: Progressing  Goal: Patient will verbalize need for hospitalization and will no longer attempt elopement within 5 days  Description: Interventions:  - Ongoing education to help patient understand need for hospitalization  Outcome: Progressing  Goal: Patient will recognize inappropriate behaviors and develop alternative behaviors within 5 days  Description: Interventions:  - Patient in collaboration with Treatment Team will develop a behavior management plan to help identify effective coping skills to deal with stressors  Outcome: Progressing

## 2021-05-20 ENCOUNTER — IMMUNIZATIONS (INPATIENT)
Dept: FAMILY MEDICINE CLINIC | Facility: HOSPITAL | Age: 51
DRG: 750 | End: 2021-05-20
Payer: COMMERCIAL

## 2021-05-20 DIAGNOSIS — Z23 ENCOUNTER FOR IMMUNIZATION: Primary | ICD-10-CM

## 2021-05-20 PROCEDURE — 99232 SBSQ HOSP IP/OBS MODERATE 35: CPT | Performed by: PSYCHIATRY & NEUROLOGY

## 2021-05-20 PROCEDURE — XW023T6 INTRODUCTION OF COVID-19 VACCINE DOSE 2 INTO MUSCLE, PERCUTANEOUS APPROACH, NEW TECHNOLOGY GROUP 6: ICD-10-PCS | Performed by: PSYCHIATRY & NEUROLOGY

## 2021-05-20 PROCEDURE — 91301 SARS-COV-2 / COVID-19 MRNA VACCINE (MODERNA) 100 MCG: CPT

## 2021-05-20 PROCEDURE — 0012A SARS-COV-2 / COVID-19 MRNA VACCINE (MODERNA) 100 MCG: CPT

## 2021-05-20 RX ORDER — METHOCARBAMOL 500 MG/1
500 TABLET, FILM COATED ORAL EVERY 8 HOURS PRN
Status: DISCONTINUED | OUTPATIENT
Start: 2021-05-20 | End: 2021-07-29 | Stop reason: HOSPADM

## 2021-05-20 RX ADMIN — LORAZEPAM 0.5 MG: 0.5 TABLET ORAL at 08:56

## 2021-05-20 RX ADMIN — METHOCARBAMOL TABLETS 500 MG: 500 TABLET, COATED ORAL at 21:12

## 2021-05-20 RX ADMIN — ALUMINUM HYDROXIDE, MAGNESIUM HYDROXIDE, AND SIMETHICONE 15 ML: 200; 200; 20 SUSPENSION ORAL at 10:33

## 2021-05-20 RX ADMIN — HALOPERIDOL 2 MG: 1 TABLET ORAL at 12:34

## 2021-05-20 RX ADMIN — LIDOCAINE 1 PATCH: 50 PATCH CUTANEOUS at 08:56

## 2021-05-20 RX ADMIN — GABAPENTIN 600 MG: 300 CAPSULE ORAL at 12:33

## 2021-05-20 RX ADMIN — HALOPERIDOL 2 MG: 1 TABLET ORAL at 17:10

## 2021-05-20 RX ADMIN — METHOCARBAMOL TABLETS 500 MG: 500 TABLET, COATED ORAL at 11:58

## 2021-05-20 RX ADMIN — DIPHENHYDRAMINE HCL 25 MG: 25 TABLET ORAL at 21:06

## 2021-05-20 RX ADMIN — FENOFIBRATE 145 MG: 145 TABLET ORAL at 08:58

## 2021-05-20 RX ADMIN — LORAZEPAM 0.5 MG: 0.5 TABLET ORAL at 17:11

## 2021-05-20 RX ADMIN — HALOPERIDOL 2 MG: 1 TABLET ORAL at 08:56

## 2021-05-20 RX ADMIN — BENZTROPINE MESYLATE 1 MG: 1 TABLET ORAL at 17:11

## 2021-05-20 RX ADMIN — NICOTINE 1 PATCH: 21 PATCH, EXTENDED RELEASE TRANSDERMAL at 08:57

## 2021-05-20 RX ADMIN — OLANZAPINE 10 MG: 10 TABLET, FILM COATED ORAL at 21:06

## 2021-05-20 RX ADMIN — GABAPENTIN 600 MG: 300 CAPSULE ORAL at 17:11

## 2021-05-20 RX ADMIN — LORAZEPAM 0.5 MG: 0.5 TABLET ORAL at 21:06

## 2021-05-20 RX ADMIN — PSYLLIUM HUSK 1 PACKET: 3.4 POWDER ORAL at 08:56

## 2021-05-20 RX ADMIN — BENZTROPINE MESYLATE 1 MG: 1 TABLET ORAL at 08:57

## 2021-05-20 RX ADMIN — HALOPERIDOL 2 MG: 1 TABLET ORAL at 21:06

## 2021-05-20 RX ADMIN — ALUMINUM HYDROXIDE, MAGNESIUM HYDROXIDE, AND SIMETHICONE 15 ML: 200; 200; 20 SUSPENSION ORAL at 17:16

## 2021-05-20 RX ADMIN — OLANZAPINE 15 MG: 5 TABLET, FILM COATED ORAL at 08:56

## 2021-05-20 RX ADMIN — GABAPENTIN 600 MG: 300 CAPSULE ORAL at 21:06

## 2021-05-20 RX ADMIN — ESCITALOPRAM OXALATE 20 MG: 10 TABLET ORAL at 08:57

## 2021-05-20 RX ADMIN — LORAZEPAM 0.5 MG: 0.5 TABLET ORAL at 12:34

## 2021-05-20 RX ADMIN — GABAPENTIN 600 MG: 300 CAPSULE ORAL at 08:56

## 2021-05-20 NOTE — NURSING NOTE
Pt is medication and meal compliant  Pt is visible in the milieu, but keeps to self otherwise  Pt is quiet and has had no outbursts thus far during shift  Pt denies s/s currently offering only c/o physical issue which has been addressed by LUCIUS  Pt frequents nursing station for different requests  Pt is currently visible on pt phone  Will continue to monitor

## 2021-05-20 NOTE — NURSING NOTE
Gabriella Ezequiel has been visible, pleasant, and cooperative  Patient had a irritable edge however no yelling noted this shift  Patient social with select peers  Denies all psych symptoms  She is medication and meal compliant  PRN Mylanta 15mL administered at 1716 for indigestion, effective  Patient also requested PRN Robaxin 500 mg administered at 2112, effective  Consumed 100% of dinner  Patient attended Therapeutic and recovery group this shift  Continue to monitor

## 2021-05-20 NOTE — SOCIAL WORK
Pt approached writer this afternoon stating she would like a follow up dental appointment for her front teeth, and confirmed that [facility] cannot do this particular procedure  Pt reached out to a different local dentist and stated she was able to secure an appointment for next Friday at 11:20AM  SW will follow up with patient and treatment team to further discuss

## 2021-05-20 NOTE — PROGRESS NOTES
05/20/21 0947   Team Meeting   Meeting Type Daily Rounds   Initial Conference Date 05/20/21   Patient/Family Present   Patient Present No   Patient's Family Present No       Daily Rounds Documentation  Team Members Participating  Dr Sherrilyn Said, MD Gilford Oto, RN Sheryle Lew, MARK ANTHONY Padilla, MARK ANTHONY Pierce Raeford, CPS    Case reviewed  No changes  A few yelling outbursts but contained  3/5 groups

## 2021-05-20 NOTE — PROGRESS NOTES
Psychiatry Progress Note [facility] 49 y o  female MRN: [id number]  Unit/Bed#: [facility] L[facility] 281-26 Encounter: [id number]  Code Status: Level 1 - Full Code    PCP: Cain Hernandez DO    Date of Admission:  4/7/2021 1435   Date of Service:  05/20/21    Patient Active Problem List   Diagnosis    Schizoaffective disorder, bipolar type ([facility] )    Uncomplicated alcohol dependence ([facility] )    Suicidal behavior    LYNN (generalized anxiety disorder)    Slow transit constipation    Deficiency of vitamin B    Degenerative disc disease, lumbar    Post-traumatic stress disorder, chronic    Lower extremity weakness    Generalized abdominal pain    Non-intractable vomiting    Medical clearance for psychiatric admission    Carbuncle    Alcohol dependence with unspecified alcohol-induced disorder ([facility] )    Mild intermittent asthma without complication    Tobacco abuse    Ear problem, bilateral     Review of systems:     Unremarkable but complains of back spasms  Diagnosis:           Schizoaffective bipolar, alcohol use disorder, Episodic in early remission in controlled environment  ,Assessment   Overall Status:            No yelling or screaming except 1 time in the morning when upset with not getting the bin later than when supposed to  [address]: The patient will continue to require additional inpatient hospital stay due to ongoing mood swings paranoia   Acceptance by patient:  accepting    Hopefulness in recovery:  Living in a group home   Understanding of medications: has good understanding   Involved in reintegration process:  Adjusting to the unit   Trusting in relationship with psychiatrist:  trusting   Justification for dual anti-psychotics:  Not applicable   Side effects from treatment: none    Medication changes   Add Robaxin as prn for back spasms  Medication Education;  Risks and S/E and precautions of all meds given to patient and she did verbalize an understanding   Non-pharmacological treatments   Continue with individual, group, milieu and occupational therapy using recovery principles and psycho-education about accepting illness and the need for treatment  Safety   Safety and communication plan established to target dynamic risk factors discussed above  Discharge Plan     most likely to a group home with the act team again     Interval Progress         Patient doing better with less yelling and screaming and only yelled once yesterday morning when denied the bin  For shower after she showed up much later than when she was supposed  2, claiming the staff is discriminating against her being mentally sick  Otherwise she is doing better has a brighter affect and is interacting well friendly and pleasant and polite  She still has some residual paranoia about staff affect and icing her  She feels happy and content on the current current medication regimen     Sleep:         Good   appetite:       good   compliance with medications :   good   Side  Effects:                   None reported   significant events:             Marked decrease in yelling screaming but still delusional at times   group attendance:        Attending most of the groups    Mental Status Exam  Appearance: age appropriate, casually dressed, looks older than stated age, underweight  Friendly and pleasant, polite     Behavior: normal, pleasant, cooperative, appears anxious,  more friendly today and not confrontational    Speech: fluent, clear, coherent, increased rate, hypertalkative,   Circumstantial pressured    Mood: depressed, anxious, euphoric  Affect: inappropriate, labile, reactive, slightly brighter  Thought Process: less prominent, tends to be pressured and circumstantial and preoccupied perseverating on discharge    Thought Content: some paranoia, mild paranoia, ideas of reference, but does appear as if paranoid    No current suicidal homicidal thoughts intent or plans verbalized  No phobias obsessions compulsions or distorted body perceptions elicited  Perceptual Disturbances: no auditory hallucinations, no visual hallucinations, To claim to hear voices but unable to elaborate what they tell her and claims to feel things are moving around her     Hx Risk Factors: chronic mood disorder, chronic psychotic symptoms, alcohol use, limted insight  Sensorium:        Oriented to 3 spheres and to situation    Cognition: recent and remote memory grossly intact  Consciousness: alert and awake  Attention: attention span and concentration are age appropriate  Intellect: appears to be of average intelligence  Insight: improving  Judgement: improving  Motor Activity: no abnormal movements     Vitals  Temp:  [97 1 °F (36 2 °C)] 97 1 °F (36 2 °C)  HR:  [84-95] 84  Resp:  [18] 18  BP: (110-129)/(73-75) 110/73  No intake or output data in the 24 hours ending 05/20/21 0743    Lab Results:  [facility] Admission Reviewed     Current Facility-Administered Medications   Medication Dose Route Frequency Provider Last Rate    acetaminophen  650 mg Oral Q6H PRN Álvaro Lama, CRNP      acetaminophen  650 mg Oral Q4H PRN Álvaro Lama, CRNP      acetaminophen  975 mg Oral Q6H PRN Isis Lama, CRNP      al mag oxide-diphenhydramine-lidocaine viscous  10 mL Swish & Swallow Q4H PRN Leslie Velazquez, CRNP      albuterol  2 puff Inhalation Q6H PRN Isis Lama, CRNP      aluminum-magnesium hydroxide-simethicone  15 mL Oral Q4H PRN Jasmyn Deng, CRNP      benztropine  1 mg Intramuscular Q4H PRN Max 6/day Isis Lama, CRNP      benztropine  1 mg Oral Q4H PRN Max 6/day Isis Lama, CRNP      benztropine  1 mg Oral BID Woodrow Burkitt, MD      calcium carbonate  500 mg Oral TID PRN Álvaro Lama, RENATANP      diphenhydrAMINE  25 mg Oral HS Isis Lama, RENATANP      escitalopram  20 mg Oral Daily Isis Lama, RENATANP      fenofibrate 145 mg Oral Daily Isis Lama, CRNP      gabapentin  600 mg Oral 4x Daily Isis Lama, CRNP      haloperidol  2 mg Oral 4x Daily Nerissa Sanchez MD      haloperidol  5 mg Oral Q6H PRN Kimberly Lama, CRNP      hydrOXYzine HCL  25 mg Oral Q6H PRN Max 4/day Isis Lama, CRNP      hydrOXYzine HCL  50 mg Oral Q6H PRN Max 4/day Isis Lama, CRNP      lidocaine  1 patch Topical Daily Isis Lama, CRNP      LORazepam  1 mg Intramuscular Q6H PRN Nerissa Sanchez MD      LORazepam  0 5 mg Oral 4x Daily Nerissa Sanchez MD      magnesium hydroxide  30 mL Oral Daily PRN Kimberly Lama, CRNP      nicotine  1 patch Transdermal Daily Liliam Lively, CRNP      OLANZapine  2 5 mg Oral Q8H PRN Liliam Lively, CRNP      OLANZapine  5 mg Oral Q3H PRN Liliam Lively, CRNP      OLANZapine  7 5 mg Oral Q3H PRN Liliam Lively, CRNP      OLANZapine  10 mg Intramuscular Q3H PRN Liliam Lively, CRNP      OLANZapine  5 mg Intramuscular Q3H PRN Liliam Lively, CRNP      OLANZapine  10 mg Oral HS Iqra Caal MD      OLANZapine  15 mg Oral Daily Matteo Sanchez MD      paliperidone palmitate ER  234 mg Intramuscular Q30 Days Nerissa Sanchez MD      Pramox-PE-Glycerin-Petrolatum  1 application Rectal 4x Daily PRN Kimberly Lama, CRNP      prazosin  1 mg Oral HS Liliam Lively, CRNP      psyllium  1 packet Oral Daily JARED Isbell         Counseling / Coordination of Care: Total floor / unit time spent today 15 minutes  Greater than 50% of total time was spent with the patient and / or family counseling and / or somewhat receptive to supportive listening and teaching positive coping skills to deal with symptom mangement  Patient's Rights, confidentiality and exceptions to confidentiality, use of automated medical record, [facility] staff access to medical record, and consent to treatment reviewed     This note was  not shared with the patient because it might further aggravate her psychiatric condition  This note has been dictated

## 2021-05-20 NOTE — PLAN OF CARE
Problem: Alteration in Thoughts and Perception  Goal: Verbalize thoughts and feelings  Description: Interventions:  - Promote a nonjudgmental and trusting relationship with the patient through active listening and therapeutic communication  - Assess patient's level of functioning, behavior and potential for risk  - Engage patient in 1 on 1 interactions  - Encourage patient to express fears, feelings, frustrations, and discuss symptoms    - [place] patient to reality, help patient recognize reality-based thinking   - Administer medications as ordered and assess for potential side effects  - Provide the patient education related to the signs and symptoms of the illness and desired effects of prescribed medications  Outcome: Progressing  Goal: Refrain from acting on delusional thinking/internal stimuli  Description: Interventions:  - Monitor patient closely, per order   - Utilize least restrictive measures   - Set reasonable limits, give positive feedback for acceptable   - Administer medications as ordered and monitor of potential side effects  Outcome: Not Progressing  Goal: Attend and participate in unit activities, including therapeutic, recreational, and educational groups  Description: Interventions:  -Encourage Visitation and family involvement in care  Outcome: Progressing

## 2021-05-21 PROCEDURE — 99232 SBSQ HOSP IP/OBS MODERATE 35: CPT | Performed by: PSYCHIATRY & NEUROLOGY

## 2021-05-21 RX ADMIN — METHOCARBAMOL TABLETS 500 MG: 500 TABLET, COATED ORAL at 20:31

## 2021-05-21 RX ADMIN — OLANZAPINE 15 MG: 5 TABLET, FILM COATED ORAL at 08:36

## 2021-05-21 RX ADMIN — ESCITALOPRAM OXALATE 20 MG: 10 TABLET ORAL at 08:37

## 2021-05-21 RX ADMIN — DIPHENHYDRAMINE HCL 25 MG: 25 TABLET ORAL at 21:04

## 2021-05-21 RX ADMIN — GABAPENTIN 600 MG: 300 CAPSULE ORAL at 11:51

## 2021-05-21 RX ADMIN — GABAPENTIN 600 MG: 300 CAPSULE ORAL at 21:04

## 2021-05-21 RX ADMIN — HALOPERIDOL 2 MG: 1 TABLET ORAL at 17:09

## 2021-05-21 RX ADMIN — GLYCERIN, PETROLATUM, PHENYLEPHRINE HCL, PRAMOXINE HCL 1 APPLICATION: 144; 2.5; 10; 15 CREAM TOPICAL at 09:31

## 2021-05-21 RX ADMIN — LORAZEPAM 0.5 MG: 0.5 TABLET ORAL at 17:09

## 2021-05-21 RX ADMIN — HALOPERIDOL 2 MG: 1 TABLET ORAL at 21:04

## 2021-05-21 RX ADMIN — ALUMINUM HYDROXIDE, MAGNESIUM HYDROXIDE, AND SIMETHICONE 15 ML: 200; 200; 20 SUSPENSION ORAL at 10:08

## 2021-05-21 RX ADMIN — BENZTROPINE MESYLATE 1 MG: 1 TABLET ORAL at 08:36

## 2021-05-21 RX ADMIN — LORAZEPAM 0.5 MG: 0.5 TABLET ORAL at 11:51

## 2021-05-21 RX ADMIN — FENOFIBRATE 145 MG: 145 TABLET ORAL at 08:38

## 2021-05-21 RX ADMIN — METHOCARBAMOL TABLETS 500 MG: 500 TABLET, COATED ORAL at 09:25

## 2021-05-21 RX ADMIN — NICOTINE 1 PATCH: 21 PATCH, EXTENDED RELEASE TRANSDERMAL at 08:39

## 2021-05-21 RX ADMIN — LIDOCAINE 1 PATCH: 50 PATCH CUTANEOUS at 08:38

## 2021-05-21 RX ADMIN — HALOPERIDOL 2 MG: 1 TABLET ORAL at 11:51

## 2021-05-21 RX ADMIN — GABAPENTIN 600 MG: 300 CAPSULE ORAL at 17:09

## 2021-05-21 RX ADMIN — BENZTROPINE MESYLATE 1 MG: 1 TABLET ORAL at 17:09

## 2021-05-21 RX ADMIN — HALOPERIDOL 2 MG: 1 TABLET ORAL at 08:37

## 2021-05-21 RX ADMIN — OLANZAPINE 10 MG: 10 TABLET, FILM COATED ORAL at 21:04

## 2021-05-21 RX ADMIN — PSYLLIUM HUSK 1 PACKET: 3.4 POWDER ORAL at 08:38

## 2021-05-21 RX ADMIN — LORAZEPAM 0.5 MG: 0.5 TABLET ORAL at 08:36

## 2021-05-21 RX ADMIN — LORAZEPAM 0.5 MG: 0.5 TABLET ORAL at 21:03

## 2021-05-21 RX ADMIN — PHENYTOIN 1 MG: 125 SUSPENSION ORAL at 21:04

## 2021-05-21 RX ADMIN — GABAPENTIN 600 MG: 300 CAPSULE ORAL at 08:35

## 2021-05-21 NOTE — NURSING NOTE
Alert, cooperative and visible intermittently  No SI  noted  Pt having visual and auditory hallucinations  Pt stated that when she had a stool this am it looked like a skull or a rat, and said I was not hallucinating  Pt also stated she did not tell or show anyone because she felt staff would make her eat the rat or whatever it was that caused her stool to look like that  Denies depression, anxiety and pain  Consumed 100% of breakfast and 10% of lunch  Took all medication without prompting  Maintained on safe precautions without incident   Will continue to monitor progress and recovery program

## 2021-05-21 NOTE — NURSING NOTE
Charlie Rivera has been visible, pleasant, and cooperative  Patient had 2 minor episodes of yelling however patient was easily redirectable  Patient social with select peers  Denies all psych symptoms  She is medication and meal compliant  Patient requested PRN Robaxin 500 mg administered at 2031, effective  Consumed 100% of dinner  Patient attended wrap up  group this shift  Continue to monitor

## 2021-05-21 NOTE — NURSING NOTE
Pt stated she had emesis after lunch  Afebrile  [place] offered  No further complaints of emesis or nausea noted

## 2021-05-21 NOTE — SOCIAL WORK
Pt approached SW after IMR to speak about discharge  She requested for SW to confirm with Meg Camejo from [facility] what group homes she'll be put on the wait list for and what her status currently is, if there is any idea as to when a bed more open for her  SW will e-mail CMP to follow up on this

## 2021-05-21 NOTE — PLAN OF CARE
Problem: Alteration in Thoughts and Perception  Goal: Verbalize thoughts and feelings  Description: Interventions:  - Promote a nonjudgmental and trusting relationship with the patient through active listening and therapeutic communication  - Assess patient's level of functioning, behavior and potential for risk  - Engage patient in 1 on 1 interactions  - Encourage patient to express fears, feelings, frustrations, and discuss symptoms    - [place] patient to reality, help patient recognize reality-based thinking   - Administer medications as ordered and assess for potential side effects  - Provide the patient education related to the signs and symptoms of the illness and desired effects of prescribed medications  Outcome: Progressing     Problem: Alteration in Thoughts and Perception  Goal: Attend and participate in unit activities, including therapeutic, recreational, and educational groups  Description: Interventions:  -Encourage Visitation and family involvement in care  Outcome: Progressing    Patient completed Goal Card for the week of 5/24/21    Goals: Attend most groups              Journal more              Read/Meditate after walking daily

## 2021-05-21 NOTE — PROGRESS NOTES
Psychiatry Progress Note [facility] 49 y o  female MRN: [id number]  Unit/Bed#: [facility] 649-03 Encounter: [id number]  Code Status: Level 1 - Full Code    PCP: Naty Calvin DO    Date of Admission:  4/7/2021 1435   Date of Service:  05/21/21    Patient Active Problem List   Diagnosis    Schizoaffective disorder, bipolar type ([facility] )    Uncomplicated alcohol dependence ([facility] )    Suicidal behavior    LYNN (generalized anxiety disorder)    Slow transit constipation    Deficiency of vitamin B    Degenerative disc disease, lumbar    Post-traumatic stress disorder, chronic    Lower extremity weakness    Generalized abdominal pain    Non-intractable vomiting    Medical clearance for psychiatric admission    Carbuncle    Alcohol dependence with unspecified alcohol-induced disorder ([facility] )    Mild intermittent asthma without complication    Tobacco abuse    Ear problem, bilateral     Review of systems:     Needed Mylanta for stomach upset and Robaxin for back spasm and is waiting for a dental appointment next week  Diagnosis:            Schizoaffective bipolar, alcohol use disorder episodic in early remission in controlled environment  ,Assessment   Overall Status:          No verbal outbursts friendly and pleasant and attending groups   Certification Statement: The patient will continue to require additional inpatient hospital stay due to ongoing mood swings paranoia   Acceptance by patient:  accepting    Hopefulness in recovery:  Living in a group home   Understanding of medications: has good understanding   Involved in reintegration process:  Adjusting to the unit   Trusting in relationship with psychiatrist:  trusting   Justification for dual anti-psychotics:  Not applicable   Side effects from treatment: none    Medication changes   Add Robaxin as prn for back spasms  Medication Education;  Risks and S/E and precautions of all meds given to patient and she did verbalize an understanding   Non-pharmacological treatments   Continue with individual, group, milieu and occupational therapy using recovery principles and psycho-education about accepting illness and the need for treatment  Safety   Safety and communication plan established to target dynamic risk factors discussed above  Discharge Plan     most likely to a group home with the act team again     Interval Progress         No yelling or screaming episodes yesterday  Has been attending groups her affect is brighter and she is interacting better and is polite but tends to become paranoid about staff is still hypnotizing her at times  She feels happy and content with the current medication regiment  Affect is brighter and she relates better and is well groomed and appears happy and content but did need some p r n  meds for somatic complaints    Sleep:          good   appetite:        good   compliance with medications :    good   Side  Effects:                   None reported today   significant events:             Not yelling or screaming lately but still tends to get occasionally paranoid   group attendance:         Attending most of the groups    Mental Status Exam  Appearance: age appropriate, casually dressed, looks older than stated age, underweight   Found laying on bed but did get up when approached   Behavior: normal, pleasant, cooperative, appears anxious,  more friendly today and not confrontational    Speech: fluent, clear, coherent, increased rate, hypertalkative,   Circumstantial pressured    Mood: depressed, anxious, euphoric  Affect: inappropriate, labile, reactive, slightly brighter  Thought Process: less prominent, tends to be pressured and circumstantial and preoccupied perseverating on discharge    Thought Content: some paranoia, mild paranoia, ideas of reference, but does appear as if paranoid  No current suicidal homicidal thoughts intent or plans verbalized    No phobias obsessions compulsions or distorted body perceptions elicited  Perceptual Disturbances: no auditory hallucinations, no visual hallucinations, To claim to hear voices but unable to elaborate what they tell her and claims to feel things are moving around her     Hx Risk Factors: chronic mood disorder, chronic psychotic symptoms, alcohol use, limted insight  Sensorium:       Oriented to 3 spheres and to situation   Cognition: recent and remote memory grossly intact  Consciousness: alert and awake  Attention: attention span and concentration are age appropriate  Intellect: appears to be of average intelligence  Insight: improving  Judgement: improving  Motor Activity: no abnormal movements     Vitals  Temp:  [97 1 °F (36 2 °C)-97 8 °F (36 6 °C)] 97 8 °F (36 6 °C)  HR:  [84-89] 89  Resp:  [16-18] 16  BP: (104-133)/(55-82) 104/55  No intake or output data in the 24 hours ending 05/21/21 0547    Lab Results:  [facility] Admission Reviewed     Current Facility-Administered Medications   Medication Dose Route Frequency Provider Last Rate    acetaminophen  650 mg Oral Q6H PRN Manya Kanner Lodics, CRNP      acetaminophen  650 mg Oral Q4H PRN Manya Kanner Lodics, CRNP      acetaminophen  975 mg Oral Q6H PRN Isis Lama, CRNP      al mag oxide-diphenhydramine-lidocaine viscous  10 mL Swish & Swallow Q4H PRN Juvencio Lugo, JARED      albuterol  2 puff Inhalation Q6H PRN Isis Lama, CRNP      aluminum-magnesium hydroxide-simethicone  15 mL Oral Q4H PRN Jasmyn Deng, CRNP      benztropine  1 mg Intramuscular Q4H PRN Max 6/day Isis Lama, CRNP      benztropine  1 mg Oral Q4H PRN Max 6/day Isis Lama, CRNP      benztropine  1 mg Oral BID Stella Wyatt MD      calcium carbonate  500 mg Oral TID PRN Manya Kanner Lodics, CRNP      diphenhydrAMINE  25 mg Oral HS Isis Lama, CRNP      escitalopram  20 mg Oral Daily Isis Lama, CRNP      fenofibrate  145 mg Oral Daily Isis Lama, CRNP      gabapentin  600 mg Oral 4x Daily Isis Lama, CRNP      haloperidol  2 mg Oral 4x Daily Lorna Roe MD      haloperidol  5 mg Oral Q6H PRN Wilcox Cancel Lodics, CRNP      hydrOXYzine HCL  25 mg Oral Q6H PRN Max 4/day Isis Lama, CRNP      hydrOXYzine HCL  50 mg Oral Q6H PRN Max 4/day Isis Lama, CRNP      lidocaine  1 patch Topical Daily Isis Lama, CRNP      LORazepam  1 mg Intramuscular Q6H PRN Lorna Roe MD      LORazepam  0 5 mg Oral 4x Daily Lorna Roe MD      magnesium hydroxide  30 mL Oral Daily PRN David Cancel Lodics, CRNP      methocarbamol  500 mg Oral Q8H PRN Lorna Roe MD      nicotine  1 patch Transdermal Daily Wing Fields, CRNP      OLANZapine  2 5 mg Oral Q8H PRN Wing Fields, CRNP      OLANZapine  5 mg Oral Q3H PRN Wing Fields, CRNP      OLANZapine  7 5 mg Oral Q3H PRN Wing Fields, CRNP      OLANZapine  10 mg Intramuscular Q3H PRN Wing Fields, CRNP      OLANZapine  5 mg Intramuscular Q3H PRN Wing Fields, CRNP      OLANZapine  10 mg Oral HS Iqra Caal MD      OLANZapine  15 mg Oral Daily Slime Mcfarland MD      paliperidone palmitate ER  234 mg Intramuscular Q30 Days Lorna Roe MD      Pramox-PE-Glycerin-Petrolatum  1 application Rectal 4x Daily PRN David Cancel Lodics, CRNP      prazosin  1 mg Oral HS Wing Fields, JARED      psyllium  1 packet Oral Daily JARED Isbell         Counseling / Coordination of Care: Total floor / unit time spent today 15 minutes  Greater than 50% of total time was spent with the patient and / or family counseling and / or somewhat receptive to supportive listening and teaching positive coping skills to deal with symptom mangement  Patient's Rights, confidentiality and exceptions to confidentiality, use of automated medical record, [facility] staff access to medical record, and consent to treatment reviewed     This note was

## 2021-05-21 NOTE — PROGRESS NOTES
05/20/21 2100   Team Meeting   Meeting Type Daily Rounds   Initial Conference Date 05/19/21   Patient/Family Present   Patient Present No   Patient's Family Present No         Daily Rounds Documentation  Team Members Participating  MD Rafa Ruiz, YUDY  [place] [id number], [id number] [facility]  Nithin Mendoza RN    Case reviewed  Switched rooms due to admission, and was screaming in response for a brief period  Then settled down and laid in bed  4/5 groups  Med compliant

## 2021-05-21 NOTE — PROGRESS NOTES
05/21/21 0959   Team Meeting   Meeting Type Daily Rounds   Initial Conference Date 05/21/21   Patient/Family Present   Patient Present No   Patient's Family Present No         Daily Rounds Documentation  Team Members Participating  MD Saleem Mckenzie, RN  Kenny Vu, LSW  Jasmin Weber, LSW  Nini Blanton, CPS    Case reviewed  5/5 groups  Ordered Robaxin for c/o foot pain and back pain  Social with select peers

## 2021-05-21 NOTE — PROGRESS NOTES
05/21/21 1100   Activity/Group Checklist   Group Community meeting   Attendance Attended   Attendance Duration (min) 46-60   Interactions Interacted appropriately   Affect/Mood Calm;Constricted   Goals Achieved Identified triggers; Discussed coping strategies

## 2021-05-21 NOTE — PROGRESS NOTES
05/21/21 1216   Team Meeting   Meeting Type Tx Team Meeting   Initial Conference Date 05/18/21   Next Conference Date 05/25/21   Team Members Present   Team Members Present Physician;Nurse;; Other (Discipline and Name)   Patient/Family Present   Patient Present Yes   Patient's Family Present No       Summary: Patient presented for her treatment team meeting without a completed self assessment, reporting she never received a form though forms are available at nurse station and reminders are made by staff to complete  Pt is calm, cooperative at this time  Pt reported she feels she is doing better with her outbursts and also that she is "ready to go to a group home    I'm doing much better" somewhat rambling and tangential while sharing  Pt still lacks insight and minimizes her drug and alcohol issues, however does admit to that being a significant part of her instability in the community (along with conflict in relationships, unstable housing)  Pt asked when she can go to a group home and Rollo Sample was able to supply an update, based upon the treatment team's acknowledgement that she is  doing better, to clarify at her upcoming meeting what group home patient can be slated for and get a sense of when, if possible, a bed may open  Rollo Sample indicated she'll know more about this next week  Pt was pleased to hear that she is moving forward in the program and with discharge planning  Her groups this week were 71%, which she has maintained well since coming [facility]  Pt praised for her progress and after reviewing treatment plan the meeting ended mutually

## 2021-05-22 PROCEDURE — 99232 SBSQ HOSP IP/OBS MODERATE 35: CPT | Performed by: NURSE PRACTITIONER

## 2021-05-22 RX ADMIN — GABAPENTIN 600 MG: 300 CAPSULE ORAL at 08:14

## 2021-05-22 RX ADMIN — GABAPENTIN 600 MG: 300 CAPSULE ORAL at 21:33

## 2021-05-22 RX ADMIN — LORAZEPAM 0.5 MG: 0.5 TABLET ORAL at 08:26

## 2021-05-22 RX ADMIN — LORAZEPAM 0.5 MG: 0.5 TABLET ORAL at 17:28

## 2021-05-22 RX ADMIN — NICOTINE 1 PATCH: 21 PATCH, EXTENDED RELEASE TRANSDERMAL at 08:26

## 2021-05-22 RX ADMIN — BENZTROPINE MESYLATE 1 MG: 1 TABLET ORAL at 08:14

## 2021-05-22 RX ADMIN — METHOCARBAMOL TABLETS 500 MG: 500 TABLET, COATED ORAL at 19:54

## 2021-05-22 RX ADMIN — OLANZAPINE 15 MG: 5 TABLET, FILM COATED ORAL at 08:13

## 2021-05-22 RX ADMIN — METHOCARBAMOL TABLETS 500 MG: 500 TABLET, COATED ORAL at 10:49

## 2021-05-22 RX ADMIN — ALUMINUM HYDROXIDE, MAGNESIUM HYDROXIDE, AND SIMETHICONE 15 ML: 200; 200; 20 SUSPENSION ORAL at 14:14

## 2021-05-22 RX ADMIN — HALOPERIDOL 5 MG: 5 TABLET ORAL at 14:14

## 2021-05-22 RX ADMIN — BENZTROPINE MESYLATE 1 MG: 1 TABLET ORAL at 17:28

## 2021-05-22 RX ADMIN — HALOPERIDOL 2 MG: 1 TABLET ORAL at 12:19

## 2021-05-22 RX ADMIN — HALOPERIDOL 2 MG: 1 TABLET ORAL at 21:26

## 2021-05-22 RX ADMIN — PHENYTOIN 1 MG: 125 SUSPENSION ORAL at 21:26

## 2021-05-22 RX ADMIN — FENOFIBRATE 145 MG: 145 TABLET ORAL at 08:15

## 2021-05-22 RX ADMIN — GABAPENTIN 600 MG: 300 CAPSULE ORAL at 17:28

## 2021-05-22 RX ADMIN — PSYLLIUM HUSK 1 PACKET: 3.4 POWDER ORAL at 08:13

## 2021-05-22 RX ADMIN — DIPHENHYDRAMINE HCL 25 MG: 25 TABLET ORAL at 21:26

## 2021-05-22 RX ADMIN — HALOPERIDOL 2 MG: 1 TABLET ORAL at 08:14

## 2021-05-22 RX ADMIN — OLANZAPINE 10 MG: 10 TABLET, FILM COATED ORAL at 21:26

## 2021-05-22 RX ADMIN — GABAPENTIN 600 MG: 300 CAPSULE ORAL at 12:20

## 2021-05-22 RX ADMIN — HALOPERIDOL 2 MG: 1 TABLET ORAL at 17:28

## 2021-05-22 RX ADMIN — LIDOCAINE 1 PATCH: 50 PATCH CUTANEOUS at 08:15

## 2021-05-22 RX ADMIN — LORAZEPAM 0.5 MG: 0.5 TABLET ORAL at 12:20

## 2021-05-22 RX ADMIN — LORAZEPAM 0.5 MG: 0.5 TABLET ORAL at 21:33

## 2021-05-22 RX ADMIN — ESCITALOPRAM OXALATE 20 MG: 10 TABLET ORAL at 08:14

## 2021-05-22 NOTE — PROGRESS NOTES
Received pt in bed at change of shift with eyes closed; chest movement noted   Continues the same per q 7 min room checks    Will continue to monitor

## 2021-05-22 NOTE — PLAN OF CARE
Problem: Nutrition/Hydration-ADULT  Goal: Nutrient/Hydration intake appropriate for improving, restoring or maintaining nutritional needs  Description: Monitor and assess patient's nutrition/hydration status for malnutrition  Collaborate with interdisciplinary team and initiate plan and interventions as ordered  Monitor patient's weight and dietary intake as ordered or per policy  Utilize nutrition screening tool and intervene as necessary  Determine patient's food preferences and provide high-protein, high-caloric foods as appropriate       INTERVENTIONS:  - Monitor oral intake, urinary output, labs, and treatment plans  - Assess nutrition and hydration status and recommend course of action  - Evaluate amount of meals eaten  - Assist patient with eating if necessary   - Allow adequate time for meals  - Recommend/ encourage appropriate diets, oral nutritional supplements, and vitamin/mineral supplements  - Order, calculate, and assess calorie counts as needed  - Recommend, monitor, and adjust tube feedings and TPN/PPN based on assessed needs  - Assess need for intravenous fluids  - Provide specific nutrition/hydration education as appropriate  - Include patient/family/caregiver in decisions related to nutrition  Outcome: Progressing     Problem: Alteration in Thoughts and Perception  Goal: Verbalize thoughts and feelings  Description: Interventions:  - Promote a nonjudgmental and trusting relationship with the patient through active listening and therapeutic communication  - Assess patient's level of functioning, behavior and potential for risk  - Engage patient in 1 on 1 interactions  - Encourage patient to express fears, feelings, frustrations, and discuss symptoms    - [place] patient to reality, help patient recognize reality-based thinking   - Administer medications as ordered and assess for potential side effects  - Provide the patient education related to the signs and symptoms of the illness and desired effects of prescribed medications  Outcome: Progressing  Goal: Attend and participate in unit activities, including therapeutic, recreational, and educational groups  Description: Interventions:  -Encourage Visitation and family involvement in care  Outcome: Progressing  Goal: Complete daily ADLs, including personal hygiene independently, as able  Description: Interventions:  - Observe, teach, and assist patient with ADLS  - Monitor and promote a balance of rest/activity, with adequate nutrition and elimination   Outcome: Progressing     Problem: Anxiety  Goal: Anxiety is at manageable level  Description: Interventions:  - Assess and monitor patient's anxiety level  - Monitor for signs and symptoms (heart palpitations, chest pain, shortness of breath, headaches, nausea, feeling jumpy, restlessness, irritable, apprehensive)  - Collaborate with interdisciplinary team and initiate plan and interventions as ordered    - [place] patient to unit/surroundings  - Explain treatment plan  - Encourage participation in care  - Encourage verbalization of concerns/fears  - Identify coping mechanisms  - Assist in developing anxiety-reducing skills  - Administer/offer alternative therapies  - Limit or eliminate stimulants  Outcome: Progressing

## 2021-05-22 NOTE — NURSING NOTE
Quiet, cooperative and visible intermittently  Out of room for meals and groups  Med and meal compliant  Denies depression, anxiety, SI, and HI  Complained of stomach discomfort w/o emesis or loose stool  C/o pain and swelling in feet, advised to elevate and rest, no swelling noted  C/o bilateral leg discomfort, PRN Robaxin given with moderate relief  One out burst during Art Therapy  Wanted PRN  Advised to finish art therapy and reassess  Patient agreed  Reassessment pending completion of group  Maintained on safe precautions without incident  Will continue to monitor progress in recovery program     ** Patient was able to self regulate after outburst   Able to take redirection

## 2021-05-22 NOTE — PROGRESS NOTES
Progress Note - Behavioral Health   Mahalia Ormond 49 y o  female MRN: [id number]  [facility] Encounter: [id number]    Assessment/Plan   Principal Problem:    Schizoaffective disorder, bipolar type ([address] )  Active Problems:    Post-traumatic stress disorder, chronic    Medical clearance for psychiatric admission    Mild intermittent asthma without complication    Ear problem, bilateral      Subjective:Patient was seen today for continuation of care, records reviewed and  patient was discussed with the morning case review team     Patient seen today for psychiatric follow-up  Gee Gallagher is calm, cooperative in conversation  She is denying depression and anxiety  Denies any suicidal or homicidal ideation  She reports no auditory or visual hallucinations at this time  She reports that she did have an episode of emesis yesterday, however she had been brushing her teeth and believes she gagged on her toothbrush  She is awaiting an opening at a group home for discharge  She is compliant with her medications and denies any side effects from the medications other than what she believes is leg stiffness from the Haldol  She states that she received Robaxin for her like stiffness last evening which was affective  She reports that she has had no outbursts for 6 days  She has been attending groups, has a good appetite, and has been sleeping  She did request to reorder of her Ensure, which this provider will do      Psychiatric Review of Systems:    Sleep: normal  Appetite: normal  Medication side effects: Yes - leg stiffness which is relieved with the Robaxin   ROS: no complaints, all other systems are negative    Vitals:  Vitals:    05/22/21 0700   BP: 112/69   Pulse: 99   Resp: 18   Temp: 97 8 °F (36 6 °C)   SpO2:        Mental Status Evaluation:    Appearance:  age appropriate, casually dressed, dressed appropriately   Behavior:  pleasant, cooperative, calm   Speech:  normal volume, normal pitch, increased rate Mood:  euthymic   Affect:  mood-congruent   Thought Process:  organized, logical, coherent, goal directed   Associations: intact associations   Thought Content:  no overt delusions   Perceptual Disturbances: denies auditory or visual hallucinations when asked   Risk Potential: Suicidal ideation - None  Homicidal ideation - None  Potential for aggression - No   Sensorium:  oriented to person, place, time/date and situation   Memory:  recent and remote memory grossly intact   Consciousness:  alert and awake   Attention: attention span and concentration appear shorter than expected for age   Insight:  improving   Judgment: improving   Gait/Station: normal gait/station   Motor Activity: no abnormal movements     Laboratory results:  I have personally reviewed all pertinent laboratory/tests results  Progress Toward Goals:     Progressing  Denies psychiatric symptoms  Medication compliant  Awaiting placement at a group home      Recommended Treatment:     All current active medications have been reviewed  Encourage group therapy, milieu therapy and occupational therapy  Behavioral Health checks every 7 minutes  Continue current medications:  Current Facility-Administered Medications   Medication Dose Route Frequency Provider Last Rate    acetaminophen  650 mg Oral Q6H PRN Prosper Hoose Lodics, CRNP      acetaminophen  650 mg Oral Q4H PRN Prosper Hoose Lodics, CRNP      acetaminophen  975 mg Oral Q6H PRN Isis Lama, CRNP      al mag oxide-diphenhydramine-lidocaine viscous  10 mL Swish & Swallow Q4H PRN Anuradha Ly, CRNP      albuterol  2 puff Inhalation Q6H PRN Isis Lama, CRNP      aluminum-magnesium hydroxide-simethicone  15 mL Oral Q4H PRN Jasmyn L Sowmya, CRNP      benztropine  1 mg Intramuscular Q4H PRN Max 6/day Isis Lama, CRNP      benztropine  1 mg Oral Q4H PRN Max 6/day Isis Lama, CRNP      benztropine  1 mg Oral BID Nino Woodward MD      calcium carbonate  500 mg Oral TID PRN Kathy Lama, CRNP      diphenhydrAMINE  25 mg Oral HS Isis Lama, CRNP      escitalopram  20 mg Oral Daily Isis Lama, CRNP      fenofibrate  145 mg Oral Daily Isis Lama, CRNP      gabapentin  600 mg Oral 4x Daily Isis Lama, CRNP      haloperidol  2 mg Oral 4x Daily Geoff Cowan MD      haloperidol  5 mg Oral Q6H PRN Kathy Lama, CRNP      hydrOXYzine HCL  25 mg Oral Q6H PRN Max 4/day Isis Lama, CRNP      hydrOXYzine HCL  50 mg Oral Q6H PRN Max 4/day Isis Lama, CRNP      lidocaine  1 patch Topical Daily Isis Lama, CRNP      LORazepam  1 mg Intramuscular Q6H PRN Geoff Cowan MD      LORazepam  0 5 mg Oral 4x Daily Geoff Cowan MD      magnesium hydroxide  30 mL Oral Daily PRN Kathy Lama, CRNP      methocarbamol  500 mg Oral Q8H PRN Geoff Cowan MD      nicotine  1 patch Transdermal Daily Sandro Primrose, RENATANP      OLANZapine  2 5 mg Oral Q8H PRN Jo Primrose, CRNP      OLANZapine  5 mg Oral Q3H PRN Jo Primrose, CRNP      OLANZapine  7 5 mg Oral Q3H PRN Jo Primrose, CRNP      OLANZapine  10 mg Intramuscular Q3H PRN Jo Primrose, CRNP      OLANZapine  5 mg Intramuscular Q3H PRN Jo Primrose, CRNP      OLANZapine  10 mg Oral HS Iqra Caal MD      OLANZapine  15 mg Oral Daily Chelsea Lynch MD      paliperidone palmitate ER  234 mg Intramuscular Q30 Days Geoff Cowan MD      Pramox-PE-Glycerin-Petrolatum  1 application Rectal 4x Daily PRN Isis Lama, CRNP      prazosin  1 mg Oral HS Sandro Primrose, JARED      psyllium  1 packet Oral Daily JARED Isbell         Risks / Benefits of Treatment:     Risks, benefits, and possible side effects of medications explained to patient and patient verbalizes understanding and agreement for treatment  Counseling / Coordination of Care:     Patient's progress reviewed with nursing staff    Medications, treatment progress and treatment plan reviewed with patient  Supportive counseling provided to the patient            JARED Adams

## 2021-05-22 NOTE — NURSING NOTE
Arlyn Schirmer was in the dining room at the beginning of the shift  She then went to her room to rest in bed prior to supper  Ate 100% of her meal  Had to be called a couple times to come for her medication  Did take all pills  Behavior has been controlled  No interaction with peers noted  Continue to monitor  Precautions maintained

## 2021-05-22 NOTE — NURSING NOTE
At 31 [phone] [id number] she was laying in bed in her room screaming  This writer approached her and suggested taking a prn medication, which she declined  "I will wait until my night meds"  "They keep saying that I am hallucinating, but I am not  They don't listen to me  I am done talking about this"  She closed her eyes and stayed in bed  Did not go out for fresh air  Showered tonight  Continue to monitor  Precautions maintained

## 2021-05-23 PROBLEM — K21.9 GASTROESOPHAGEAL REFLUX DISEASE: Chronic | Status: ACTIVE | Noted: 2021-05-23

## 2021-05-23 PROCEDURE — 99232 SBSQ HOSP IP/OBS MODERATE 35: CPT | Performed by: NURSE PRACTITIONER

## 2021-05-23 RX ORDER — PANTOPRAZOLE SODIUM 40 MG/1
40 TABLET, DELAYED RELEASE ORAL
Status: DISCONTINUED | OUTPATIENT
Start: 2021-05-24 | End: 2021-07-29 | Stop reason: HOSPADM

## 2021-05-23 RX ADMIN — GABAPENTIN 600 MG: 300 CAPSULE ORAL at 17:36

## 2021-05-23 RX ADMIN — PSYLLIUM HUSK 1 PACKET: 3.4 POWDER ORAL at 08:11

## 2021-05-23 RX ADMIN — HALOPERIDOL 2 MG: 1 TABLET ORAL at 12:33

## 2021-05-23 RX ADMIN — HALOPERIDOL 2 MG: 1 TABLET ORAL at 21:42

## 2021-05-23 RX ADMIN — ESCITALOPRAM OXALATE 20 MG: 10 TABLET ORAL at 08:08

## 2021-05-23 RX ADMIN — LORAZEPAM 0.5 MG: 0.5 TABLET ORAL at 08:08

## 2021-05-23 RX ADMIN — GABAPENTIN 600 MG: 300 CAPSULE ORAL at 12:33

## 2021-05-23 RX ADMIN — NICOTINE 1 PATCH: 21 PATCH, EXTENDED RELEASE TRANSDERMAL at 08:07

## 2021-05-23 RX ADMIN — FENOFIBRATE 145 MG: 145 TABLET ORAL at 08:20

## 2021-05-23 RX ADMIN — GABAPENTIN 600 MG: 300 CAPSULE ORAL at 08:08

## 2021-05-23 RX ADMIN — METHOCARBAMOL TABLETS 500 MG: 500 TABLET, COATED ORAL at 15:50

## 2021-05-23 RX ADMIN — BENZTROPINE MESYLATE 1 MG: 1 TABLET ORAL at 08:08

## 2021-05-23 RX ADMIN — LIDOCAINE 1 PATCH: 50 PATCH CUTANEOUS at 08:11

## 2021-05-23 RX ADMIN — OLANZAPINE 10 MG: 10 TABLET, FILM COATED ORAL at 21:42

## 2021-05-23 RX ADMIN — HALOPERIDOL 2 MG: 1 TABLET ORAL at 17:36

## 2021-05-23 RX ADMIN — DIPHENHYDRAMINE HCL 25 MG: 25 TABLET ORAL at 21:43

## 2021-05-23 RX ADMIN — LORAZEPAM 0.5 MG: 0.5 TABLET ORAL at 12:33

## 2021-05-23 RX ADMIN — ALUMINUM HYDROXIDE, MAGNESIUM HYDROXIDE, AND SIMETHICONE 15 ML: 200; 200; 20 SUSPENSION ORAL at 14:05

## 2021-05-23 RX ADMIN — HALOPERIDOL 2 MG: 1 TABLET ORAL at 08:08

## 2021-05-23 RX ADMIN — PHENYTOIN 1 MG: 125 SUSPENSION ORAL at 21:42

## 2021-05-23 RX ADMIN — OLANZAPINE 15 MG: 5 TABLET, FILM COATED ORAL at 08:07

## 2021-05-23 RX ADMIN — GABAPENTIN 600 MG: 300 CAPSULE ORAL at 21:43

## 2021-05-23 RX ADMIN — BENZTROPINE MESYLATE 1 MG: 1 TABLET ORAL at 17:36

## 2021-05-23 RX ADMIN — LORAZEPAM 0.5 MG: 0.5 TABLET ORAL at 17:36

## 2021-05-23 RX ADMIN — MAGNESIUM HYDROXIDE 30 ML: 400 SUSPENSION ORAL at 16:09

## 2021-05-23 RX ADMIN — LORAZEPAM 0.5 MG: 0.5 TABLET ORAL at 21:42

## 2021-05-23 NOTE — PROGRESS NOTES
05/22/21 1300   Activity/Group Checklist   Group Other (Comment)  (OPEN STUDIO/Art Therapy, Social Interaction-Free Expression)   Attendance Attended   Attendance Duration (min) 31-45   Interactions Interacted appropriately   Affect/Mood Appropriate   Goals Achieved Able to listen to others; Able to engage in interactions

## 2021-05-23 NOTE — NURSING NOTE
Ismael at [phone] [address] for muscle spasms in legs and ankles  Relief with same  No further complaint  Attended Wrap Up group  Ate snack  Medication at HS had to be taken to her room  Took all pills  She had already taken off her Lidoderm patch from her lower back and Nicotine patch  Had both patches laying on her bedside table, which were then disposed by staff  Continue to monitor  Precautions maintained

## 2021-05-23 NOTE — PROGRESS NOTES
Progress Note - Behavioral Health   Gualberto Cornea 49 y o  female MRN: [id number]  Unit/Bed#: [facility] 112-01 Encounter: [id number]    Assessment/Plan   Principal Problem:    Schizoaffective disorder, bipolar type ([address] )  Active Problems:    Post-traumatic stress disorder, chronic    Medical clearance for psychiatric admission    Mild intermittent asthma without complication    Ear problem, bilateral      Subjective:Patient was seen today for continuation of care, records reviewed and  patient was discussed with the morning case review team     Patient seen for psychiatric follow-up  Skylar Choen is out of bed and ambulating the unit, calm and cooperative in conversation  She reports that she had auditory hallucinations yesterday that caused her to yell out  She reports that happened during a group and she was able to remove herself from the group prior to screaming  She reports that the hallucinations are irritating  She is confused about why she has the voices, and states that she is afraid of "going over the edge and becoming insane  Discussed with Skylar Cohen different ways to cope with the voices  She is agreeable, and understands that she may need to learn to tolerate the hallucinations  She denies any auditory or visual hallucinations at this current time  Denies SI and HI, denies depression  She reports she is anxious for discharge, as there are "things I need to tend to at home"  She is medication compliant and denies any side effects from the medication      Psychiatric Review of Systems:    Sleep: normal  Appetite: normal  Medication side effects: No   ROS: no complaints, all other systems are negative    Vitals:  Vitals:    05/23/21 0700   BP: 90/59   Pulse: 94   Resp: 18   Temp: 98 2 °F (36 8 °C)   SpO2:        Mental Status Evaluation:    Appearance:  casually dressed, dressed appropriately   Behavior:  cooperative, calm   Speech:  normal rate, normal volume, normal pitch   Mood:  anxious   Affect:  appropriate, mood-congruent   Thought Process:  organized, logical, coherent, goal directed   Associations: intact associations   Thought Content:  no overt delusions   Perceptual Disturbances: no auditory hallucinations, no visual hallucinations, denies currently auditory and visual hallucinations, but they are intermittent and fleeting   Risk Potential: Suicidal ideation - None  Homicidal ideation - None  Potential for aggression - No   Sensorium:  oriented to person, place, time/date and situation   Memory:  recent and remote memory grossly intact   Consciousness:  alert and awake   Attention: attention span and concentration appear shorter than expected for age   Insight:  limited   Judgment: limited   Gait/Station: normal gait/station   Motor Activity: no abnormal movements     Laboratory results:    I have personally reviewed all pertinent laboratory/tests results  Progress Toward Goals:     Progressing  Denies auditory and visual hallucinations at this time  Medication compliant, denies any side effects from the medications  Treatment and discharge planning on going      Recommended Treatment:     All current active medications have been reviewed  Encourage group therapy, milieu therapy and occupational therapy  Behavioral Health checks every 7 minutes  Continue current medications:  Current Facility-Administered Medications   Medication Dose Route Frequency Provider Last Rate    acetaminophen  650 mg Oral Q6H PRN Mortimer Bridges Lodics, CRNP      acetaminophen  650 mg Oral Q4H PRN Mortimer Bridges Lodics, CRNP      acetaminophen  975 mg Oral Q6H PRN JARED Jones      al mag oxide-diphenhydramine-lidocaine viscous  10 mL Swish & Swallow Q4H PRN Dale Collar, JARED      albuterol  2 puff Inhalation Q6H PRN JARED Jones      aluminum-magnesium hydroxide-simethicone  15 mL Oral Q4H PRN Jasmyn GRANADOS SowmyaJARED      benztropine  1 mg Intramuscular Q4H PRN Max 6/day Mortimer Bridges Lodics, CRNP      benztropine  1 mg Oral Q4H PRN Max 6/day Isis Lama, CRNP      benztropine  1 mg Oral BID Annabella Grey MD      calcium carbonate  500 mg Oral TID PRN Edward Lama, CRNP      diphenhydrAMINE  25 mg Oral HS Isis Lama, CRNP      escitalopram  20 mg Oral Daily Isis Lama, CRNP      fenofibrate  145 mg Oral Daily Isis Lama, CRNP      gabapentin  600 mg Oral 4x Daily Isis Lama, CRNP      haloperidol  2 mg Oral 4x Daily Padmini Nelson MD      haloperidol  5 mg Oral Q6H PRN Edward Lama, CRNP      hydrOXYzine HCL  25 mg Oral Q6H PRN Max 4/day Isis Lama, CRNP      hydrOXYzine HCL  50 mg Oral Q6H PRN Max 4/day Isis Lama, CRNP      lidocaine  1 patch Topical Daily Isis Lama, CRNP      LORazepam  1 mg Intramuscular Q6H PRN Padmini Nelson MD      LORazepam  0 5 mg Oral 4x Daily Padmini Nelson MD      magnesium hydroxide  30 mL Oral Daily PRN Edward Lama, CRNP      methocarbamol  500 mg Oral Q8H PRN Padmini Nelson MD      nicotine  1 patch Transdermal Daily Ousmane Soto, JARED      OLANZapine  2 5 mg Oral Q8H PRN Ousmane Soto, CRNP      OLANZapine  5 mg Oral Q3H PRN Ousmane Soto, CRNP      OLANZapine  7 5 mg Oral Q3H PRN Ousmane Soto, CRNP      OLANZapine  10 mg Intramuscular Q3H PRN Ousmane Soto, CRNP      OLANZapine  5 mg Intramuscular Q3H PRN Ousmane Soto, RENATANP      OLANZapine  10 mg Oral HS Iqra Caal MD      OLANZapine  15 mg Oral Daily Adolfo Fonseca MD      paliperidone palmitate ER  234 mg Intramuscular Q30 Days Padmini Nelson MD      Pramox-PE-Glycerin-Petrolatum  1 application Rectal 4x Daily PRN Isis Lama, JARED      prazosin  1 mg Oral HS Ousmane Soto, JARED      psyllium  1 packet Oral Daily JARED Isbell         Risks / Benefits of Treatment:     Risks, benefits, and possible side effects of medications explained to patient and patient verbalizes understanding and agreement for treatment  Counseling / Coordination of Care:     Patient's progress reviewed with nursing staff  Medications, treatment progress and treatment plan reviewed with patient  Supportive counseling provided to the patient            JARED Orourke

## 2021-05-23 NOTE — ASSESSMENT & PLAN NOTE
The pathophysiology of reflux was discussed  Anti-reflux measures such as raising the head of the bed, avoiding tight clothing or belts, avoiding eating late at night and not lying down shortly after mealtime and achieving weight loss are discussed  Avoid ASA, NSAID's, caffeine, peppermints, alcohol and tobacco  H2 blockers and/or antacids are often very helpful for PRN use  She should alert nursing if there are persistent symptoms, dysphagia, weight loss or GI bleeding      Plan  Order pantoprazole 40 mg PO daily

## 2021-05-23 NOTE — NURSING NOTE
Quiet, cooperative and visible intermittently  One outburst while on phone  Redirected and cooperative  Med and meal compliant  Denies depression, anxiety, SI, and HI  Complains of right foot and ankle pain  No injury, no swelling or redness  Reports painful to walk  Wants x-ray  Patient also requesting something for acid in stomach because of pain and continuous use of Mylanta  SLIM notified  Maintained on safe precautions without incident    Will continue to monitor progress in recovery program

## 2021-05-24 PROCEDURE — 99232 SBSQ HOSP IP/OBS MODERATE 35: CPT | Performed by: PSYCHIATRY & NEUROLOGY

## 2021-05-24 RX ADMIN — NICOTINE 1 PATCH: 21 PATCH, EXTENDED RELEASE TRANSDERMAL at 08:36

## 2021-05-24 RX ADMIN — LORAZEPAM 0.5 MG: 0.5 TABLET ORAL at 21:16

## 2021-05-24 RX ADMIN — GABAPENTIN 600 MG: 300 CAPSULE ORAL at 08:35

## 2021-05-24 RX ADMIN — OLANZAPINE 15 MG: 5 TABLET, FILM COATED ORAL at 08:34

## 2021-05-24 RX ADMIN — ACETAMINOPHEN 975 MG: 325 TABLET ORAL at 19:32

## 2021-05-24 RX ADMIN — BENZTROPINE MESYLATE 1 MG: 1 TABLET ORAL at 08:33

## 2021-05-24 RX ADMIN — OLANZAPINE 10 MG: 10 TABLET, FILM COATED ORAL at 21:16

## 2021-05-24 RX ADMIN — HALOPERIDOL 2 MG: 1 TABLET ORAL at 17:05

## 2021-05-24 RX ADMIN — HALOPERIDOL 2 MG: 1 TABLET ORAL at 08:34

## 2021-05-24 RX ADMIN — METHOCARBAMOL TABLETS 500 MG: 500 TABLET, COATED ORAL at 15:07

## 2021-05-24 RX ADMIN — BENZTROPINE MESYLATE 1 MG: 1 TABLET ORAL at 17:05

## 2021-05-24 RX ADMIN — PSYLLIUM HUSK 1 PACKET: 3.4 POWDER ORAL at 08:35

## 2021-05-24 RX ADMIN — FENOFIBRATE 145 MG: 145 TABLET ORAL at 08:35

## 2021-05-24 RX ADMIN — PANTOPRAZOLE SODIUM 40 MG: 40 TABLET, DELAYED RELEASE ORAL at 06:03

## 2021-05-24 RX ADMIN — HALOPERIDOL 2 MG: 1 TABLET ORAL at 12:24

## 2021-05-24 RX ADMIN — GABAPENTIN 600 MG: 300 CAPSULE ORAL at 21:16

## 2021-05-24 RX ADMIN — LORAZEPAM 0.5 MG: 0.5 TABLET ORAL at 12:23

## 2021-05-24 RX ADMIN — ESCITALOPRAM OXALATE 20 MG: 10 TABLET ORAL at 08:34

## 2021-05-24 RX ADMIN — GABAPENTIN 600 MG: 300 CAPSULE ORAL at 12:23

## 2021-05-24 RX ADMIN — HALOPERIDOL 2 MG: 1 TABLET ORAL at 21:16

## 2021-05-24 RX ADMIN — LORAZEPAM 0.5 MG: 0.5 TABLET ORAL at 17:05

## 2021-05-24 RX ADMIN — GABAPENTIN 600 MG: 300 CAPSULE ORAL at 17:05

## 2021-05-24 RX ADMIN — LIDOCAINE 1 PATCH: 50 PATCH CUTANEOUS at 08:36

## 2021-05-24 RX ADMIN — LORAZEPAM 0.5 MG: 0.5 TABLET ORAL at 08:33

## 2021-05-24 RX ADMIN — DIPHENHYDRAMINE HCL 25 MG: 25 TABLET ORAL at 21:16

## 2021-05-24 NOTE — NURSING NOTE
Patient is compliant with medication and meals  Patient attends mostly all groups  She is social with most peers  She has excellent A D L "s also She continues to yell out at times when in her room

## 2021-05-24 NOTE — NURSING NOTE
Patient asked nurse to have the doctor increase her Lamictal because she knows she can handle a larger dose    [facility]

## 2021-05-24 NOTE — PROGRESS NOTES
05/24/21 0948   Team Meeting   Meeting Type Daily Rounds   Initial Conference Date 05/24/21   Patient/Family Present   Patient Present No   Patient's Family Present No       Daily Rounds Documentation  Team Members Participating  MD Melecio Stratton, POORNIMAN  Trinity Bejarano, MARK ANTHONY Pitts, DECLANW  Tom Chand, ANNALISE    Case reviewed  Reported BM that looked like "a skull of a rat " Outburst during art therapy  PRN robaxin  Protonix also started  3/6 groups Friday

## 2021-05-24 NOTE — PROGRESS NOTES
Psychiatry Progress Note [facility] 49 y o  female MRN: [id number]  Unit/Bed#: [facility] 860-04 Encounter: [id number]  Code Status: Level 1 - Full Code    PCP: Angelika Blunt DO    Date of Admission:  4/7/2021 1435   Date of Service:  05/24/21    Patient Active Problem List   Diagnosis    Schizoaffective disorder, bipolar type ([facility] )    Uncomplicated alcohol dependence ([facility] )    Suicidal behavior    LYNN (generalized anxiety disorder)    Slow transit constipation    Deficiency of vitamin B    Degenerative disc disease, lumbar    Post-traumatic stress disorder, chronic    Lower extremity weakness    Generalized abdominal pain    Non-intractable vomiting    Medical clearance for psychiatric admission    Carbuncle    Alcohol dependence with unspecified alcohol-induced disorder ([place] [address] )    Mild intermittent asthma without complication    Tobacco abuse    Ear problem, bilateral    Gastroesophageal reflux disease     Review of systems:   Needed p r n  meds like a Robaxin for back spasm but otherwise unremarkable  Diagnosis:        Schizoaffective bipolar, alcohol use disorder episodic in early remission in controlled environment  ,Assessment   Overall Status:          No verbal outbursts friendly and pleasant and attending groups   Certification Statement: The patient will continue to require additional inpatient hospital stay due to ongoing mood swings paranoia   Acceptance by patient:  accepting    Hopefulness in recovery:  Living in a group home   Understanding of medications: has good understanding   Involved in reintegration process:  Adjusting to the unit   Trusting in relationship with psychiatrist:  trusting   Justification for dual anti-psychotics:  Not applicable   Side effects from treatment: none    Medication changes    none  Medication Education;  Risks and S/E and precautions of all meds given to patient and she did verbalize an understanding Non-pharmacological treatments   Continue with individual, group, milieu and occupational therapy using recovery principles and psycho-education about accepting illness and the need for treatment  Safety   Safety and communication plan established to target dynamic risk factors discussed above  Discharge Plan     most likely to a group home with the act team again     Interval Progress          Patient was reportedly yelling and screaming once over the weekend but without need for p r n  meds  On Friday night she claimed that she had seen a rat and a skull in her toilet  but did not show anyone  as she thought they would make her eat it if she showed it to the staff! Continues to have some paranoia about staff still hip not icing her but is usually pleasant friendly when approached and able to interact  better with staff and peers  Better groomed relates well appears happy and content when approached   Sleep:           good   appetite:        good   compliance with medications :   good   Side  Effects:                   None reported today   significant events:              Not yelling or screaming but still tends to get occasionally paranoid   group attendance:         Attending most of the groups    Mental Status Exam  Appearance: age appropriate, casually dressed, looks older than stated age, underweight   Found in the dining acharya  coloring and was friendly pleasant and cooperative   Behavior: normal, pleasant, cooperative, appears anxious,  more friendly today and not confrontational    Speech: fluent, clear, coherent, increased rate, hypertalkative,   Circumstantial pressured    Mood: depressed, anxious, euphoric  Affect: inappropriate, labile, reactive, slightly brighter  Thought Process: less prominent, tends to be pressured and circumstantial and preoccupied perseverating on discharge    Thought Content: some paranoia, mild paranoia, ideas of reference, but does appear as if paranoid    No current suicidal homicidal thoughts intent or plans verbalized  No phobias obsessions compulsions or distorted body perceptions elicited  Perceptual Disturbances: no auditory hallucinations, no visual hallucinations, To claim to hear voices but unable to elaborate what they tell her and claims to feel things are moving around her   But not at the time of the interview   Hx Risk Factors: chronic mood disorder, chronic psychotic symptoms, alcohol use, limted insight  Sensorium:        Oriented to 3 spheres and to situation  Cognition: recent and remote memory grossly intact  Consciousness: alert and awake  Attention: attention span and concentration are age appropriate  Intellect: appears to be of average intelligence  Insight: improving  Judgement: improving  Motor Activity: no abnormal movements     Vitals  Temp:  [97 °F (36 1 °C)-97 7 °F (36 5 °C)] 97 7 °F (36 5 °C)  HR:  [85-99] 88  Resp:  [18] 18  BP: (102-136)/(61-88) 102/61  No intake or output data in the 24 hours ending 05/24/21 0758    Lab Results:  [facility] Admission Reviewed     Current Facility-Administered Medications   Medication Dose Route Frequency Provider Last Rate    acetaminophen  650 mg Oral Q6H PRN Latif Calamity Lodics, CRNP      acetaminophen  650 mg Oral Q4H PRN Latif Calamity Lodics, CRNP      acetaminophen  975 mg Oral Q6H PRN Isis Porterics, CRNP      al mag oxide-diphenhydramine-lidocaine viscous  10 mL Swish & Swallow Q4H PRN Orjosé miguel Callowaype, CRNP      albuterol  2 puff Inhalation Q6H PRN Isis N Lodics, CRNP      aluminum-magnesium hydroxide-simethicone  15 mL Oral Q4H PRN Jasmyn Deng, CRNP      benztropine  1 mg Intramuscular Q4H PRN Max 6/day Isis N Lodics, CRNP      benztropine  1 mg Oral Q4H PRN Max 6/day Isis N Lodics, CRNP      benztropine  1 mg Oral BID Lisa Alonzo MD      calcium carbonate  500 mg Oral TID PRN Laitf Calamity Lodics, CRNP      diphenhydrAMINE  25 mg Oral HS Bereangela Lama, CRNP      escitalopram  20 mg Oral Daily Isis Lama, CRNP      fenofibrate  145 mg Oral Daily Isis Lama, CRNP      gabapentin  600 mg Oral 4x Daily Isis Lama, CRNP      haloperidol  2 mg Oral 4x Daily Magdalena Hatch MD      haloperidol  5 mg Oral Q6H PRN Bere Lama, CRNP      hydrOXYzine HCL  25 mg Oral Q6H PRN Max 4/day Isis Lama, CRNP      hydrOXYzine HCL  50 mg Oral Q6H PRN Max 4/day Isis Lama, CRNP      lidocaine  1 patch Topical Daily Isis Lama, CRNP      LORazepam  1 mg Intramuscular Q6H PRN Magdalena Hatch MD      LORazepam  0 5 mg Oral 4x Daily Magdalena Hatch MD      magnesium hydroxide  30 mL Oral Daily PRN Bere Lama, CRNP      methocarbamol  500 mg Oral Q8H PRN Magdalena Hatch MD      nicotine  1 patch Transdermal Daily Harvey Nilson, CRNP      OLANZapine  2 5 mg Oral Q8H PRN Harvey Nilson, CRNP      OLANZapine  5 mg Oral Q3H PRN Harvey Nilson, CRNP      OLANZapine  7 5 mg Oral Q3H PRN Harvey Nilson, CRNP      OLANZapine  10 mg Intramuscular Q3H PRN Harvey Nilson, CRNP      OLANZapine  5 mg Intramuscular Q3H PRN Harvey Nilson, CRNP      OLANZapine  10 mg Oral HS Francesca Rojo MD      OLANZapine  15 mg Oral Daily Francesca Rojo MD      paliperidone palmitate ER  234 mg Intramuscular Q30 Days Magdalena Hatch MD      pantoprazole  40 mg Oral Early Morning Nina Gamboa, JARED      Pramox-PE-Glycerin-Petrolatum  1 application Rectal 4x Daily PRN Bere Lama, CRNP      prazosin  1 mg Oral HS Harvey Nilson, JARED      psyllium  1 packet Oral Daily JAERD Isbell         Counseling / Coordination of Care: Total floor / unit time spent today 15 minutes  Greater than 50% of total time was spent with the patient and / or family counseling and / or somewhat receptive to supportive listening and teaching positive coping skills to deal with symptom mangement       Patient's Rights, confidentiality and exceptions to confidentiality, use of automated medical record, [facility] staff access to medical record, and consent to treatment reviewed  This note was  not shared with the patient because it might further aggravate her psychiatric condition  This note has been dictated

## 2021-05-24 NOTE — PLAN OF CARE
Problem: Nutrition/Hydration-ADULT  Goal: Nutrient/Hydration intake appropriate for improving, restoring or maintaining nutritional needs  Description: Monitor and assess patient's nutrition/hydration status for malnutrition  Collaborate with interdisciplinary team and initiate plan and interventions as ordered  Monitor patient's weight and dietary intake as ordered or per policy  Utilize nutrition screening tool and intervene as necessary  Determine patient's food preferences and provide high-protein, high-caloric foods as appropriate       INTERVENTIONS:  - Monitor oral intake, urinary output, labs, and treatment plans  - Assess nutrition and hydration status and recommend course of action  - Evaluate amount of meals eaten  - Assist patient with eating if necessary   - Allow adequate time for meals  - Recommend/ encourage appropriate diets, oral nutritional supplements, and vitamin/mineral supplements  - Order, calculate, and assess calorie counts as needed  - Recommend, monitor, and adjust tube feedings and TPN/PPN based on assessed needs  - Assess need for intravenous fluids  - Provide specific nutrition/hydration education as appropriate  - Include patient/family/caregiver in decisions related to nutrition  Outcome: Progressing     Problem: Alteration in Thoughts and Perception  Goal: Verbalize thoughts and feelings  Description: Interventions:  - Promote a nonjudgmental and trusting relationship with the patient through active listening and therapeutic communication  - Assess patient's level of functioning, behavior and potential for risk  - Engage patient in 1 on 1 interactions  - Encourage patient to express fears, feelings, frustrations, and discuss symptoms    - [place] patient to reality, help patient recognize reality-based thinking   - Administer medications as ordered and assess for potential side effects  - Provide the patient education related to the signs and symptoms of the illness and desired effects of prescribed medications  Outcome: Progressing  Goal: Refrain from acting on delusional thinking/internal stimuli  Description: Interventions:  - Monitor patient closely, per order   - Utilize least restrictive measures   - Set reasonable limits, give positive feedback for acceptable   - Administer medications as ordered and monitor of potential side effects  Outcome: Progressing  Goal: Agree to be compliant with medication regime, as prescribed and report medication side effects  Description: Interventions:  - Offer appropriate PRN medication and supervise ingestion; conduct AIMS, as needed   Outcome: Progressing  Goal: Attend and participate in unit activities, including therapeutic, recreational, and educational groups  Description: Interventions:  -Encourage Visitation and family involvement in care  Outcome: Progressing     Problem: Ineffective Coping  Goal: Demonstrates healthy coping skills  Outcome: Not Progressing  Goal: Participates in unit activities  Description: Interventions:  - Provide therapeutic environment   - Provide required programming   - Redirect inappropriate behaviors   Outcome: Progressing  Goal: Understands least restrictive measures  Description: Interventions:  - Utilize least restrictive behavior  Outcome: Progressing     Problem: Risk for Self Injury/Neglect  Goal: Verbalize thoughts and feelings  Description: Interventions:  - Assess and re-assess patient's lethality and potential for self-injury  - Engage patient in 1:1 interactions, daily, for a minimum of 15 minutes  - Encourage patient to express feelings, fears, frustrations, hopes  - Establish rapport/trust with patient   Outcome: Progressing  Goal: Attend and participate in unit activities, including therapeutic, recreational, and educational groups  Description: Interventions:  - Provide therapeutic and educational activities daily, encourage attendance and participation, and document same in the medical record  - Obtain collateral information, encourage visitation and family involvement in care   Outcome: Progressing     Problem: Depression  Goal: Refrain from isolation  Description: Interventions:  - Develop a trusting relationship   - Encourage socialization   Outcome: Progressing  Goal: Attend and participate in unit activities, including therapeutic, recreational, and educational groups  Description: Interventions:  - Provide therapeutic and educational activities daily, encourage attendance and participation, and document same in the medical record   Outcome: Progressing     Problem: Anxiety  Goal: Anxiety is at manageable level  Description: Interventions:  - Assess and monitor patient's anxiety level  - Monitor for signs and symptoms (heart palpitations, chest pain, shortness of breath, headaches, nausea, feeling jumpy, restlessness, irritable, apprehensive)  - Collaborate with interdisciplinary team and initiate plan and interventions as ordered    - [place] patient to unit/surroundings  - Explain treatment plan  - Encourage participation in care  - Encourage verbalization of concerns/fears  - Identify coping mechanisms  - Assist in developing anxiety-reducing skills  - Administer/offer alternative therapies  - Limit or eliminate stimulants  Outcome: Progressing     Problem: Risk for Violence/Aggression Toward Others  Goal: Verbalize thoughts and feelings  Description: Interventions:  - Assess and re-assess patient's level of risk, every waking shift  - Engage patient in 1:1 interactions, daily, for a minimum of 15 minutes   - Allow patient to express feelings and frustrations in a safe and non-threatening manner   - Establish rapport/trust with patient   Outcome: Progressing  Goal: Control angry outbursts  Description: Interventions:  - Monitor patient closely, per order  - Ensure early verbal de-escalation  - Monitor prn medication needs  - Set reasonable/therapeutic limits, outline behavioral expectations, and consequences   - Provide a non-threatening milieu, utilizing the least restrictive interventions   Outcome: Not Progressing  Goal: Attend and participate in unit activities, including therapeutic, recreational, and educational groups  Description: Interventions:  - Provide therapeutic and educational activities daily, encourage attendance and participation, and document same in the medical record   Outcome: Progressing

## 2021-05-25 LAB
ATRIAL RATE: 83 BPM
ATRIAL RATE: 87 BPM
P AXIS: 65 DEGREES
P AXIS: 65 DEGREES
PR INTERVAL: 158 MS
PR INTERVAL: 162 MS
QRS AXIS: 11 DEGREES
QRS AXIS: 12 DEGREES
QRSD INTERVAL: 72 MS
QRSD INTERVAL: 74 MS
QT INTERVAL: 400 MS
QT INTERVAL: 404 MS
QTC INTERVAL: 474 MS
QTC INTERVAL: 481 MS
T WAVE AXIS: 29 DEGREES
T WAVE AXIS: 34 DEGREES
VENTRICULAR RATE: 83 BPM
VENTRICULAR RATE: 87 BPM

## 2021-05-25 PROCEDURE — 99232 SBSQ HOSP IP/OBS MODERATE 35: CPT | Performed by: PSYCHIATRY & NEUROLOGY

## 2021-05-25 PROCEDURE — 93005 ELECTROCARDIOGRAM TRACING: CPT

## 2021-05-25 PROCEDURE — 93010 ELECTROCARDIOGRAM REPORT: CPT | Performed by: INTERNAL MEDICINE

## 2021-05-25 RX ADMIN — LORAZEPAM 0.5 MG: 0.5 TABLET ORAL at 17:34

## 2021-05-25 RX ADMIN — LORAZEPAM 0.5 MG: 0.5 TABLET ORAL at 11:48

## 2021-05-25 RX ADMIN — GABAPENTIN 600 MG: 300 CAPSULE ORAL at 21:02

## 2021-05-25 RX ADMIN — HALOPERIDOL 2 MG: 1 TABLET ORAL at 21:03

## 2021-05-25 RX ADMIN — OLANZAPINE 15 MG: 5 TABLET, FILM COATED ORAL at 08:34

## 2021-05-25 RX ADMIN — HALOPERIDOL 2 MG: 1 TABLET ORAL at 17:33

## 2021-05-25 RX ADMIN — FENOFIBRATE 145 MG: 145 TABLET ORAL at 08:33

## 2021-05-25 RX ADMIN — LORAZEPAM 0.5 MG: 0.5 TABLET ORAL at 08:34

## 2021-05-25 RX ADMIN — HALOPERIDOL 2 MG: 1 TABLET ORAL at 08:34

## 2021-05-25 RX ADMIN — GABAPENTIN 600 MG: 300 CAPSULE ORAL at 11:48

## 2021-05-25 RX ADMIN — OLANZAPINE 10 MG: 10 TABLET, FILM COATED ORAL at 21:03

## 2021-05-25 RX ADMIN — BENZTROPINE MESYLATE 1 MG: 1 TABLET ORAL at 08:34

## 2021-05-25 RX ADMIN — DIPHENHYDRAMINE HCL 25 MG: 25 TABLET ORAL at 21:02

## 2021-05-25 RX ADMIN — PHENYTOIN 1 MG: 125 SUSPENSION ORAL at 21:02

## 2021-05-25 RX ADMIN — ESCITALOPRAM OXALATE 20 MG: 10 TABLET ORAL at 08:35

## 2021-05-25 RX ADMIN — PSYLLIUM HUSK 1 PACKET: 3.4 POWDER ORAL at 08:32

## 2021-05-25 RX ADMIN — NICOTINE 1 PATCH: 21 PATCH, EXTENDED RELEASE TRANSDERMAL at 08:33

## 2021-05-25 RX ADMIN — LORAZEPAM 0.5 MG: 0.5 TABLET ORAL at 21:02

## 2021-05-25 RX ADMIN — BENZTROPINE MESYLATE 1 MG: 1 TABLET ORAL at 17:34

## 2021-05-25 RX ADMIN — GABAPENTIN 600 MG: 300 CAPSULE ORAL at 17:33

## 2021-05-25 RX ADMIN — LIDOCAINE 1 PATCH: 50 PATCH CUTANEOUS at 08:33

## 2021-05-25 RX ADMIN — PANTOPRAZOLE SODIUM 40 MG: 40 TABLET, DELAYED RELEASE ORAL at 06:11

## 2021-05-25 RX ADMIN — MAGNESIUM HYDROXIDE 30 ML: 400 SUSPENSION ORAL at 21:04

## 2021-05-25 RX ADMIN — HALOPERIDOL 2 MG: 1 TABLET ORAL at 11:48

## 2021-05-25 RX ADMIN — GABAPENTIN 600 MG: 300 CAPSULE ORAL at 08:34

## 2021-05-25 NOTE — PROGRESS NOTES
Psychiatry Progress Note [facility] [address] 49 y o  female MRN: [id number]  Unit/Bed#: [facility] 217-80 Encounter: [id number]  Code Status: Level 1 - Full Code    PCP: Benigno aDi DO    Date of Admission:  4/7/2021 1435   Date of Service:  05/25/21    Patient Active Problem List   Diagnosis    Schizoaffective disorder, bipolar type ([facility] )    Uncomplicated alcohol dependence ([facility] )    Suicidal behavior    LYNN (generalized anxiety disorder)    Slow transit constipation    Deficiency of vitamin B    Degenerative disc disease, lumbar    Post-traumatic stress disorder, chronic    Lower extremity weakness    Generalized abdominal pain    Non-intractable vomiting    Medical clearance for psychiatric admission    Carbuncle    Alcohol dependence with unspecified alcohol-induced disorder ([facility] )    Mild intermittent asthma without complication    Tobacco abuse    Ear problem, bilateral    Gastroesophageal reflux disease     Review of systems:    Needed p r n  Tylenol for right ankle pain otherwise unremarkable  Diagnosis:        Schizoaffective bipolar, alcohol use disorder episodic in early remission in controlled environment  ,Assessment   Overall Status:          Improving with no loud verbal outbursts friendly pleasant attending groups   Certification Statement: The patient will continue to require additional inpatient hospital stay due to ongoing mood swings paranoia   Acceptance by patient:  accepting    Hopefulness in recovery:  Living in a group home   Understanding of medications: has good understanding   Involved in reintegration process:  Adjusting to the unit   Trusting in relationship with psychiatrist:  trusting   Justification for dual anti-psychotics:  Not applicable   Side effects from treatment: none    Medication changes    none  Medication Education;  Risks and S/E and precautions of all meds given to patient and she did verbalize an understanding Non-pharmacological treatments   Continue with individual, group, milieu and occupational therapy using recovery principles and psycho-education about accepting illness and the need for treatment  Safety   Safety and communication plan established to target dynamic risk factors discussed above  Discharge Plan     most likely to a group home with the act team again     Interval Progress     No loud Yelling or screaming reported over the last 24 hours but did minimally   Continues to have some paranoia about staff hypnoitizing her  Friendly and pleasant when approached attending more groups and feels medications are working for her  Better groomed happy and content offering no complaints or no longer making any bizarre statements about seeing a rat or a skull in the toilet like she had seen over the weekend  Sleep:            good   appetite:        good   compliance with medications :  good   Side  Effects:                    None reported   significant events:             Not yelling or screaming would still tends to get occasionally paranoid at times   group attendance:          Attending all the groups 5/5 yesterday, 80% last week     Mental Status Exam  Appearance: age appropriate, casually dressed, looks older than stated age, underweight   Attended team meeting and cooperative   Behavior: normal, pleasant, cooperative, appears anxious,  more friendly today and not confrontational    Speech: fluent, clear, coherent, increased rate, hypertalkative,   Circumstantial pressured    Mood: depressed, anxious, euphoric  Affect: inappropriate, labile, reactive, slightly brighter  Thought Process: normal abstract reasoning, less prominent, tends to be pressured and circumstantial and preoccupied perseverating on discharge    Thought Content: some paranoia, mild paranoia, ideas of reference, but does appear as if paranoid  No current suicidal homicidal thoughts intent or plans verbalized    No phobias obsessions compulsions or distorted body perceptions elicited  Perceptual Disturbances: no auditory hallucinations, no visual hallucinations, To claim to hear voices but unable to elaborate what they tell her and claims to feel things are moving around her   But not at the time of the interview   Hx Risk Factors: chronic mood disorder, chronic psychotic symptoms, alcohol use, limted insight  Sensorium:        Oriented to 3 spheres and to situation  Cognition: recent and remote memory grossly intact  Consciousness: alert and awake  Attention: attention span and concentration are age appropriate  Intellect: appears to be of average intelligence  Insight: improving  Judgement: improving  Motor Activity: no abnormal movements     Vitals  Temp:  [97 3 °F (36 3 °C)-97 7 °F (36 5 °C)] 97 3 °F (36 3 °C)  HR:  [69-88] 88  Resp:  [17-18] 17  BP: (100-109)/(61-70) 100/70  No intake or output data in the 24 hours ending 05/25/21 0550    Lab Results:  [facility] Admission Reviewed     Current Facility-Administered Medications   Medication Dose Route Frequency Provider Last Rate    acetaminophen  650 mg Oral Q6H PRN Minh Lama, CRNP      acetaminophen  650 mg Oral Q4H PRN Minh Lama, CRNP      acetaminophen  975 mg Oral Q6H PRN Isis Lama, CRNP      al mag oxide-diphenhydramine-lidocaine viscous  10 mL Swish & Swallow Q4H PRN April Hays, RENATANP      albuterol  2 puff Inhalation Q6H PRN Isis Lama, CRNP      aluminum-magnesium hydroxide-simethicone  15 mL Oral Q4H PRN Jasmyn Deng, CRNP      benztropine  1 mg Intramuscular Q4H PRN Max 6/day Isis Lama, CRNP      benztropine  1 mg Oral Q4H PRN Max 6/day Isis Lama, CRNP      benztropine  1 mg Oral BID Cesar Stubbs MD      calcium carbonate  500 mg Oral TID PRN Minh Lama, RENATANP      diphenhydrAMINE  25 mg Oral HS Isis Lama, RENATANP      escitalopram  20 mg Oral Daily Minh Larry Lodics, CRNP      fenofibrate  145 mg Oral Daily Isis Lama, CRNP      gabapentin  600 mg Oral 4x Daily Isis Lama, CRNP      haloperidol  2 mg Oral 4x Daily Ana Marte MD      haloperidol  5 mg Oral Q6H PRN Sam Lama, CRNP      hydrOXYzine HCL  25 mg Oral Q6H PRN Max 4/day Isis Lama, CRNP      hydrOXYzine HCL  50 mg Oral Q6H PRN Max 4/day Isis Lama, CRNP      lidocaine  1 patch Topical Daily Isis Lama, CRNP      LORazepam  1 mg Intramuscular Q6H PRN Ana Marte MD      LORazepam  0 5 mg Oral 4x Daily Ana Marte MD      magnesium hydroxide  30 mL Oral Daily PRN Sam Lama, CRNP      methocarbamol  500 mg Oral Q8H PRN Ana Marte MD      nicotine  1 patch Transdermal Daily Xin Rather, CRNP      OLANZapine  2 5 mg Oral Q8H PRN Xin Rather, CRNP      OLANZapine  5 mg Oral Q3H PRN Xin Rather, CRNP      OLANZapine  7 5 mg Oral Q3H PRN Xin Rather, CRNP      OLANZapine  10 mg Intramuscular Q3H PRN Xin Rather, CRNP      OLANZapine  5 mg Intramuscular Q3H PRN Xin Rather, CRNP      OLANZapine  10 mg Oral HS Ty Batista MD      OLANZapine  15 mg Oral Daily Ty Batista MD      paliperidone palmitate ER  234 mg Intramuscular Q30 Days Ana Marte MD      pantoprazole  40 mg Oral Early Morning María Elena Sell, CRNP      Pramox-PE-Glycerin-Petrolatum  1 application Rectal 4x Daily PRN Sam Lama, CRNP      prazosin  1 mg Oral HS Xin Rather, CRNP      psyllium  1 packet Oral Daily JARED Isbell         Counseling / Coordination of Care: Total floor / unit time spent today 15 minutes  Greater than 50% of total time was spent with the patient and / or family counseling and / or somewhat receptive to supportive listening and teaching positive coping skills to deal with symptom mangement       Patient's Rights, confidentiality and exceptions to confidentiality, use of automated medical record, [facility] staff access to medical record, and consent to treatment reviewed  This note was  not shared with the patient because it might further aggravate her psychiatric condition  This note has been dictated

## 2021-05-25 NOTE — PROGRESS NOTES
05/25/21 1108   Team Meeting   Meeting Type Daily Rounds   Initial Conference Date 05/25/21   Patient/Family Present   Patient Present No   Patient's Family Present No       Daily Rounds Documentation  Team Members Participating  Dr Andrea Gaxiola, MD Audrey Engle, N  [address] [facility], [facility]  Marni Gowers, [facility]  Norma Hinkle, CPS    Case reviewed  Per report, had been asking about increasing Lamictal (possibly confused with Lexapro?)  Still yelling periodically but less, and less intense and prolonged  Appears to be doing better after room change  Podiatry consult for possible plantar fascitis  5/5 groups

## 2021-05-25 NOTE — PLAN OF CARE
Problem: Nutrition/Hydration-ADULT  Goal: Nutrient/Hydration intake appropriate for improving, restoring or maintaining nutritional needs  Description: Monitor and assess patient's nutrition/hydration status for malnutrition  Collaborate with interdisciplinary team and initiate plan and interventions as ordered  Monitor patient's weight and dietary intake as ordered or per policy  Utilize nutrition screening tool and intervene as necessary  Determine patient's food preferences and provide high-protein, high-caloric foods as appropriate       INTERVENTIONS:  - Monitor oral intake, urinary output, labs, and treatment plans  - Assess nutrition and hydration status and recommend course of action  - Evaluate amount of meals eaten  - Assist patient with eating if necessary   - Allow adequate time for meals  - Recommend/ encourage appropriate diets, oral nutritional supplements, and vitamin/mineral supplements  - Order, calculate, and assess calorie counts as needed  - Recommend, monitor, and adjust tube feedings and TPN/PPN based on assessed needs  - Assess need for intravenous fluids  - Provide specific nutrition/hydration education as appropriate  - Include patient/family/caregiver in decisions related to nutrition  Outcome: Progressing     Problem: Alteration in Thoughts and Perception  Goal: Verbalize thoughts and feelings  Description: Interventions:  - Promote a nonjudgmental and trusting relationship with the patient through active listening and therapeutic communication  - Assess patient's level of functioning, behavior and potential for risk  - Engage patient in 1 on 1 interactions  - Encourage patient to express fears, feelings, frustrations, and discuss symptoms    - Dexter patient to reality, help patient recognize reality-based thinking   - Administer medications as ordered and assess for potential side effects  - Provide the patient education related to the signs and symptoms of the illness and desired effects of prescribed medications  Outcome: Progressing  Goal: Refrain from acting on delusional thinking/internal stimuli  Description: Interventions:  - Monitor patient closely, per order   - Utilize least restrictive measures   - Set reasonable limits, give positive feedback for acceptable   - Administer medications as ordered and monitor of potential side effects  Outcome: Not Progressing  Goal: Agree to be compliant with medication regime, as prescribed and report medication side effects  Description: Interventions:  - Offer appropriate PRN medication and supervise ingestion; conduct AIMS, as needed   Outcome: Progressing  Goal: Attend and participate in unit activities, including therapeutic, recreational, and educational groups  Description: Interventions:  -Encourage Visitation and family involvement in care  Outcome: Progressing     Problem: Ineffective Coping  Goal: Demonstrates healthy coping skills  Outcome: Progressing  Goal: Participates in unit activities  Description: Interventions:  - Provide therapeutic environment   - Provide required programming   - Redirect inappropriate behaviors   Outcome: Progressing  Goal: Understands least restrictive measures  Description: Interventions:  - Utilize least restrictive behavior  Outcome: Progressing     Problem: Risk for Self Injury/Neglect  Goal: Verbalize thoughts and feelings  Description: Interventions:  - Assess and re-assess patient's lethality and potential for self-injury  - Engage patient in 1:1 interactions, daily, for a minimum of 15 minutes  - Encourage patient to express feelings, fears, frustrations, hopes  - Establish rapport/trust with patient   Outcome: Progressing  Goal: Attend and participate in unit activities, including therapeutic, recreational, and educational groups  Description: Interventions:  - Provide therapeutic and educational activities daily, encourage attendance and participation, and document same in the medical record  - Obtain collateral information, encourage visitation and family involvement in care   Outcome: Progressing     Problem: Depression  Goal: Verbalize thoughts and feelings  Description: Interventions:  - Assess and re-assess patient's level of risk   - Engage patient in 1:1 interactions, daily, for a minimum of 15 minutes   - Encourage patient to express feelings, fears, frustrations, hopes   Outcome: Progressing  Goal: Refrain from isolation  Description: Interventions:  - Develop a trusting relationship   - Encourage socialization   Outcome: Progressing  Goal: Attend and participate in unit activities, including therapeutic, recreational, and educational groups  Description: Interventions:  - Provide therapeutic and educational activities daily, encourage attendance and participation, and document same in the medical record   Outcome: Progressing     Problem: Anxiety  Goal: Anxiety is at manageable level  Description: Interventions:  - Assess and monitor patient's anxiety level  - Monitor for signs and symptoms (heart palpitations, chest pain, shortness of breath, headaches, nausea, feeling jumpy, restlessness, irritable, apprehensive)  - Collaborate with interdisciplinary team and initiate plan and interventions as ordered    - Solo patient to unit/surroundings  - Explain treatment plan  - Encourage participation in care  - Encourage verbalization of concerns/fears  - Identify coping mechanisms  - Assist in developing anxiety-reducing skills  - Administer/offer alternative therapies  - Limit or eliminate stimulants  Outcome: Progressing     Problem: Risk for Violence/Aggression Toward Others  Goal: Verbalize thoughts and feelings  Description: Interventions:  - Assess and re-assess patient's level of risk, every waking shift  - Engage patient in 1:1 interactions, daily, for a minimum of 15 minutes   - Allow patient to express feelings and frustrations in a safe and non-threatening manner   - Establish rapport/trust with patient   Outcome: Progressing  Goal: Control angry outbursts  Description: Interventions:  - Monitor patient closely, per order  - Ensure early verbal de-escalation  - Monitor prn medication needs  - Set reasonable/therapeutic limits, outline behavioral expectations, and consequences   - Provide a non-threatening milieu, utilizing the least restrictive interventions   Outcome: Not Progressing     Problem: Alteration in Orientation  Goal: Interact with staff daily  Description: Interventions:  - Assess and re-assess patient's level of orientation  - Engage patient in 1 on 1 interactions, daily, for a minimum of 15 minutes   - Establish rapport/trust with patient   Outcome: Progressing  Goal: Attend and participate in unit activities, including therapeutic, recreational, and educational groups  Description: Interventions:  - Provide therapeutic and educational activities daily, encourage attendance and participation, and document same in the medical record   - Provide appropriate opportunities for reminiscence   - Provide a consistent daily routine   - Encourage family contact/visitation   Outcome: Progressing

## 2021-05-25 NOTE — CASE MANAGEMENT
Summary  Met with patient for individual therapy and to also discuss a few things related to her OP appointments (dental and vision)  Pt pleasant, brighter in approach, anxiety appears to be diminishing based upon her calmer affect  Pt asked SW to follow up on her glasses, as she's hoping to retrieve them from the clinic once they arrive  PC placed and VM left to [employer]  Also discussed plans for her dental visit Friday, since pt wants to ensure he has method of payment and her insurance cards, it being a new provider than before  Pt recognized for the steps she is taking to care for herself, since she has per her own report neglected herself for so long  Pt was receptive to this praise as she usually is, and verbalizes her pride in the progress she's made so far in the program     We spent time discussing her previous living situations and SW explored patient's perception of how she did living in the community  Pt shared more about her alcohol use, referring to [address] as her "favorite" because "I drink it, and it just makes me feel so good " Pt has never spoken about alcohol like this before (has been guarded up to this point) and shared more details today about her struggle and experiences drinking  Pt also shared the reasons why she never wants to drink again, which include not wanting to "let family down " Pt also referenced her intermittent methamphetamine abuse, which she stated "I'm not addicted [to it] but did do it a bunch of times  I never like it    I hate it, actually " Pt stated she's easily influenced by peers and never enjoyed the high from meth but would '"for some reason" do it  Pt stated when she drinks Deja or whiskey she stops her medications because she's had several friends/ family who have  from abusing the two together   Pt shared that initially she feels better, but then when she wakes up she feels the effect of not having taken her medications and reacts much more easily, starts yelling and screaming  Pt has never expressed this much detail before nor offered as transparently in session time  Pt started to identify the negative consequences of her alcohol abuse, and described herself as a "functional alcoholic " She identifies this as a big issue at one point stated, "I know if I sign myself out, and I've wanted to but stopped myself, that I'd end up on the streets and start drinking again and I can't have that  I'd rather die than drink again and let family down "     Pt also identified that she is doing her best to minimize her yelling outbursts (as encouraged by the county so she can have a better chance at group home placement), and usually can predict when they will happen  Her goal is to redirect her energy to another activity (such as yelling into a pillow) that will still allow her to release but in such a way that is less unsettling to those around her  Pt optimistic and receptive to the support and  offered in session  No

## 2021-05-25 NOTE — NURSING NOTE
Patient is compliant with medication and meals  She is social with most peers  She continues  To be medication seeking all   The time She   still continues to yell out in her room at times but it is less then then before    will continue to monitor  And chart any progress

## 2021-05-25 NOTE — PROGRESS NOTES
05/25/21 1206   Team Meeting   Meeting Type Tx Team Meeting   Initial Conference Date 05/25/21   Next Conference Date 06/01/21   Team Members Present   Team Members Present Physician;Nurse;; Other (Discipline and Name)   Physician Team Member Dr Xavier Hannah MD   Nursing Team Member Pinky Stubbs LPN   Social Work Team Member Francis Knott, [facility]   Other (Discipline and Name) Bee Cortez, Alka Messer   Patient/Family Present   Patient Present Yes   Patient's Family Present No       Summary: Patient presented for her treatment team this morning with a completed self assessment  Pt reported her main barrier was dealing with side effects of the covid vaccine  She dealt with this by resting, taking things slowly and now feels better  Pt stated her goal was to attend more groups, which she was able to do (88%) and to maintain this over the next week  Pt denied missed medications, listed her medications by name, and denied reactions to medications  Pt reported foot pain issues, a podiatry consult will follow to address  Pt reported this earlier to her nurse  She practices self care by completing groups, having good hygiene, and staying calm / trying to not scream or redirect her energy else where  A discussion took place about housing, as patient wanted to know when she'll be discharged and where  Pt learned from the county that options are being explored, however they'd like to see patient better manage and if possible reduce her yelling out, since that will likely upset other residents when she gets into a group home  Pt acknowledged this and spent time with SW working on ways to cope with her stress and handle her urges to scream out  Her progress was acknowledged, validated  Pt left the meeting feeling a sense of accomplishment and finding focus as to how she can progress towards discharge   She also spoke with SW about her dental appointment follow up, and exploring if her glasses have yet arrived

## 2021-05-25 NOTE — NURSING NOTE
Mallika Hopkins has been awake, alert, and visible intermittently out in the milieu  Pt ate 100% supper  Pt attended recovery 101 group, went out on deck with peers and staff for fresh air, and attended evening wrap up group  Pt requested prn for right ankle/foot pain #10/10  Tylenol 975 mg po given at 1932 and effective (#3/10)  Had evening snack  No behavioral issues or yelling out from room  Affect brighter, improved  Compliant with scheduled meds  Continue to monitor/assess for any changes

## 2021-05-26 PROCEDURE — 99232 SBSQ HOSP IP/OBS MODERATE 35: CPT | Performed by: PSYCHIATRY & NEUROLOGY

## 2021-05-26 RX ORDER — ESCITALOPRAM OXALATE 10 MG/1
10 TABLET ORAL DAILY
Status: DISCONTINUED | OUTPATIENT
Start: 2021-05-27 | End: 2021-06-01

## 2021-05-26 RX ORDER — ACETAMINOPHEN 325 MG/1
650 TABLET ORAL EVERY 4 HOURS
Status: DISCONTINUED | OUTPATIENT
Start: 2021-05-26 | End: 2021-05-26

## 2021-05-26 RX ORDER — SERTRALINE HYDROCHLORIDE 25 MG/1
25 TABLET, FILM COATED ORAL DAILY
Status: DISCONTINUED | OUTPATIENT
Start: 2021-05-26 | End: 2021-06-01

## 2021-05-26 RX ORDER — HALOPERIDOL 1 MG/1
1 TABLET ORAL 4 TIMES DAILY
Status: DISCONTINUED | OUTPATIENT
Start: 2021-05-26 | End: 2021-07-29 | Stop reason: HOSPADM

## 2021-05-26 RX ADMIN — OLANZAPINE 10 MG: 10 TABLET, FILM COATED ORAL at 21:41

## 2021-05-26 RX ADMIN — GABAPENTIN 600 MG: 300 CAPSULE ORAL at 17:24

## 2021-05-26 RX ADMIN — PANTOPRAZOLE SODIUM 40 MG: 40 TABLET, DELAYED RELEASE ORAL at 06:24

## 2021-05-26 RX ADMIN — BENZTROPINE MESYLATE 1 MG: 1 TABLET ORAL at 08:40

## 2021-05-26 RX ADMIN — GABAPENTIN 600 MG: 300 CAPSULE ORAL at 12:28

## 2021-05-26 RX ADMIN — PSYLLIUM HUSK 1 PACKET: 3.4 POWDER ORAL at 08:39

## 2021-05-26 RX ADMIN — DIPHENHYDRAMINE HCL 25 MG: 25 TABLET ORAL at 21:40

## 2021-05-26 RX ADMIN — LORAZEPAM 0.5 MG: 0.5 TABLET ORAL at 12:28

## 2021-05-26 RX ADMIN — BENZTROPINE MESYLATE 1 MG: 1 TABLET ORAL at 17:25

## 2021-05-26 RX ADMIN — HALOPERIDOL 1 MG: 1 TABLET ORAL at 21:40

## 2021-05-26 RX ADMIN — HALOPERIDOL 1 MG: 1 TABLET ORAL at 12:28

## 2021-05-26 RX ADMIN — GABAPENTIN 600 MG: 300 CAPSULE ORAL at 21:40

## 2021-05-26 RX ADMIN — FENOFIBRATE 145 MG: 145 TABLET ORAL at 09:00

## 2021-05-26 RX ADMIN — ACETAMINOPHEN 650 MG: 325 TABLET, FILM COATED ORAL at 12:31

## 2021-05-26 RX ADMIN — ESCITALOPRAM OXALATE 20 MG: 10 TABLET ORAL at 08:42

## 2021-05-26 RX ADMIN — HALOPERIDOL 2 MG: 1 TABLET ORAL at 08:39

## 2021-05-26 RX ADMIN — OLANZAPINE 15 MG: 5 TABLET, FILM COATED ORAL at 08:39

## 2021-05-26 RX ADMIN — LORAZEPAM 0.5 MG: 0.5 TABLET ORAL at 21:41

## 2021-05-26 RX ADMIN — SERTRALINE HYDROCHLORIDE 25 MG: 25 TABLET ORAL at 12:42

## 2021-05-26 RX ADMIN — HALOPERIDOL 1 MG: 1 TABLET ORAL at 17:25

## 2021-05-26 RX ADMIN — NICOTINE 1 PATCH: 21 PATCH, EXTENDED RELEASE TRANSDERMAL at 09:39

## 2021-05-26 RX ADMIN — LIDOCAINE 1 PATCH: 50 PATCH CUTANEOUS at 08:39

## 2021-05-26 RX ADMIN — GABAPENTIN 600 MG: 300 CAPSULE ORAL at 08:40

## 2021-05-26 RX ADMIN — LORAZEPAM 0.5 MG: 0.5 TABLET ORAL at 08:43

## 2021-05-26 RX ADMIN — METHOCARBAMOL TABLETS 500 MG: 500 TABLET, COATED ORAL at 09:57

## 2021-05-26 RX ADMIN — LORAZEPAM 0.5 MG: 0.5 TABLET ORAL at 17:25

## 2021-05-26 NOTE — NURSING NOTE
Visible on unit  Social with others  Appetite good  Conversation appropriate  Denies suicidal ideations  Medication complaint  Showered  Attends groups  She stated she was having a good day today  Smiling during conversation  Offers no current physical complaints
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
## 2021-05-26 NOTE — PROGRESS NOTES
Psychiatry Progress Note [facility] 49 y o  female MRN: [id number]  Unit/Bed#: [facility] 893-07 Encounter: [id number]  Code Status: Level 1 - Full Code    PCP: Naty Calvin DO    Date of Admission:  4/7/2021 1435   Date of Service:  05/26/21    Patient Active Problem List   Diagnosis    Schizoaffective disorder, bipolar type ([facility] )    Uncomplicated alcohol dependence ([facility] )    Suicidal behavior    LYNN (generalized anxiety disorder)    Slow transit constipation    Deficiency of vitamin B    Degenerative disc disease, lumbar    Post-traumatic stress disorder, chronic    Lower extremity weakness    Generalized abdominal pain    Non-intractable vomiting    Medical clearance for psychiatric admission    Carbuncle    Alcohol dependence with unspecified alcohol-induced disorder ([facility] )    Mild intermittent asthma without complication    Tobacco abuse    Ear problem, bilateral    Gastroesophageal reflux disease     Review of systems:    Unremarkable  Diagnosis:         Schizoaffective bipolar, alcohol use disorder episodic in early remission in controlled environment  ,Assessment   Overall Status:         Improving with no loud verbal outbursts friendly pleasant attending groups   Certification Statement: The patient will continue to require additional inpatient hospital stay due to ongoing mood swings paranoia   Acceptance by patient:  accepting    Hopefulness in recovery:  Living in a group home   Understanding of medications: has good understanding   Involved in reintegration process:  Adjusting to the unit   Trusting in relationship with psychiatrist:  trusting   Justification for dual anti-psychotics:  Not applicable   Side effects from treatment: none    Medication changes    none  Medication Education;  Risks and S/E and precautions of all meds given to patient and she did verbalize an understanding   Non-pharmacological treatments   Continue with individual, group, milieu and occupational therapy using recovery principles and psycho-education about accepting illness and the need for treatment  Safety   Safety and communication plan established to target dynamic risk factors discussed above  Discharge Plan     most likely to a group home with the act team again     Interval Progress      Was reportedly yelling again yesterday a few times  Her EKG shows increasing  which could be from the Lexapro and the Haldol which will be discussed with patient  She still has some paranoia which is lingering about staff hipnotizing her off and on appears to be happy content and better groomed and longer making any bizarre statements    She is attending groups and making Current progress and is asking when she can be referred to a group home but I keep reminding her that she needs to cut down or yelling out loudly so that she can be referred to a group home at least for a period of 1 month to show consistent improvement   Sleep:             good   appetite:         good   compliance with medications :   good   Side  Effects:                     None report   significant events:             Occasional yelling and screaming which has markedly decreased and still occasionally paranoid   group attendance:           Attending most of the groups    Mental Status Exam  Appearance: age appropriate, casually dressed, looks older than stated age, underweight    Pleasant cooperative when approached  Behavior: normal, pleasant, cooperative, appears anxious,  more friendly today and not confrontational skeptical about med changes offered but finally agreed    Speech: fluent, clear, coherent, increased rate, hypertalkative,   Circumstantial pressured    Mood: depressed, anxious, euphoric  Affect: inappropriate, labile, reactive, slightly brighter  Thought Process: normal abstract reasoning, less prominent, tends to be pressured and circumstantial and preoccupied perseverating on discharge    Thought Content: some paranoia, mild paranoia, ideas of reference, but does appear as if paranoid  No current suicidal homicidal thoughts intent or plans verbalized  No phobias obsessions compulsions or distorted body perceptions elicited  Perceptual Disturbances: no auditory hallucinations, no visual hallucinations, To claim to hear voices but unable to elaborate what they tell her and claims to feel things are moving around her   Claims the voices are going away at the time of the interview  Hx Risk Factors: chronic mood disorder, chronic psychotic symptoms, alcohol use, limted insight  Sensorium:         Oriented to 3 spheres and to situation  Cognition: recent and remote memory grossly intact  Consciousness: alert and awake  Attention: attention span and concentration are age appropriate  Intellect: appears to be of average intelligence  Insight: improving  Judgement: improving  Motor Activity: no abnormal movements     Vitals  Temp:  [97 8 °F (36 6 °C)-98 °F (36 7 °C)] 98 °F (36 7 °C)  HR:  [81-97] 88  Resp:  [17-18] 17  BP: (101-121)/(64-75) 121/74  No intake or output data in the 24 hours ending 05/26/21 0754    Lab Results:  [facility] Admission Reviewed     Current Facility-Administered Medications   Medication Dose Route Frequency Provider Last Rate    acetaminophen  650 mg Oral Q6H PRN Marly Lama, CRNP      acetaminophen  650 mg Oral Q4H PRN Marly Lama, CRNP      acetaminophen  975 mg Oral Q6H PRN Isis Lama, CRNP      al mag oxide-diphenhydramine-lidocaine viscous  10 mL Swish & Swallow Q4H PRN Alhaji Muñoz, CRNP      albuterol  2 puff Inhalation Q6H PRN Isis Porterics, CRNP      aluminum-magnesium hydroxide-simethicone  15 mL Oral Q4H PRN Jasmyn Deng, CRNP      benztropine  1 mg Intramuscular Q4H PRN Max 6/day Isisrebeka Porterics, CRNP      benztropine  1 mg Oral Q4H PRN Max 6/day Isis Lama, CRNP      benztropine  1 mg Oral BID Ronni Stahl MD      calcium carbonate  500 mg Oral TID PRN Venancio Aloe Lodics, CRNP      diphenhydrAMINE  25 mg Oral HS Isis Porterics, CRNP      escitalopram  20 mg Oral Daily Isis Porterics, CRNP      fenofibrate  145 mg Oral Daily Isis Porterics, CRNP      gabapentin  600 mg Oral 4x Daily Isis Porterics, CRNP      haloperidol  2 mg Oral 4x Daily Shefali Stanley MD      haloperidol  5 mg Oral Q6H PRN Venancio Aloe Lodics, CRNP      hydrOXYzine HCL  25 mg Oral Q6H PRN Max 4/day Isis Lama, CRNP      hydrOXYzine HCL  50 mg Oral Q6H PRN Max 4/day Isis Lama, CRNP      lidocaine  1 patch Topical Daily Isis Lama, CRNP      LORazepam  1 mg Intramuscular Q6H PRN Shefali Stanley MD      LORazepam  0 5 mg Oral 4x Daily Shefali Stanley MD      magnesium hydroxide  30 mL Oral Daily PRN Venancio Aloe Lodics, CRNP      methocarbamol  500 mg Oral Q8H PRN Shefali Stanley MD      nicotine  1 patch Transdermal Daily Elsworth Pee, CRNP      OLANZapine  2 5 mg Oral Q8H PRN Elsworth Pee, CRNP      OLANZapine  5 mg Oral Q3H PRN Elsworth Pee, CRNP      OLANZapine  7 5 mg Oral Q3H PRN Elsworth Pee, CRNP      OLANZapine  10 mg Intramuscular Q3H PRN Elsworth Pee, CRNP      OLANZapine  5 mg Intramuscular Q3H PRN Elsworth Pee, CRNP      OLANZapine  10 mg Oral HS Luis Maldonado MD      OLANZapine  15 mg Oral Daily Luis Maldonado MD      paliperidone palmitate ER  234 mg Intramuscular Q30 Days Shefali Stanley MD      pantoprazole  40 mg Oral Early Morning Hesham Pearson, JARED      Pramox-PE-Glycerin-Petrolatum  1 application Rectal 4x Daily PRN Venancio Aloe Lodics, CRNP      prazosin  1 mg Oral HS Elsworth Pee, CRNP      psyllium  1 packet Oral Daily JARED Isbell         Counseling / Coordination of Care: Total floor / unit time spent today 15 minutes   Greater than 50% of total time was spent with the patient and / or family counseling and / or somewhat receptive to supportive listening and teaching positive coping skills to deal with symptom mangement  Patient's Rights, confidentiality and exceptions to confidentiality, use of automated medical record, Howie Atkinson staff access to medical record, and consent to treatment reviewed  This note was  not shared with the patient because it might further aggravate her psychiatric condition  This note has been dictated

## 2021-05-26 NOTE — NURSING NOTE
Patient is compliant with medication and meals  Patient does attend groups  But continues to be very somatic  She continues to ask for more and more different kinds of medication   ,She also  Has her delusions where as if she was talking to some one she will yell out

## 2021-05-26 NOTE — NURSING NOTE
Charlie Rivera has been awake, alert, and visible intermittently out in the milieu  Pt ate 100% supper  Pt pleasant, polite, and cooperative  Pt noted yelling in hallway once at beginning of shift and instructed to stop yelling  Pt then went to her room and able to calm self down  No further issues this shift  Pt has not verbalized any delusions  Attended and participated in evening wrap up group and had evening snack  Pt stated that she feels "good"  Compliant with scheduled meds  Pt requested Milk of Magnesia for constipation (last BM 5/23)  Milk of Magnesia 30 cc po prn given at 2104 and await results  Continue to monitor/assess for any changes

## 2021-05-26 NOTE — CASE MANAGEMENT
PC to [facility]- confirmed pt's appointment this Friday at 11:20am  Also PC to [facility] but no answer, so  left with request for call back to see if pt's glasses arrived

## 2021-05-26 NOTE — PROGRESS NOTES
05/26/21 0956   Team Meeting   Meeting Type Daily Rounds   Initial Conference Date 05/26/21   Patient/Family Present   Patient Present No   Patient's Family Present No       Daily Rounds Documentation  Team Members Participating  MD Hawa Elizabeth, NEYDA Odom, LSW Benedetto Bloch, DECLANW  Kimberly Lr, CPS    Case reviewed  Periods of yelling out, more brief and less intense  Irritable  3/5 groups  Had good therapy session yesterday

## 2021-05-27 PROCEDURE — 99232 SBSQ HOSP IP/OBS MODERATE 35: CPT | Performed by: PHYSICIAN ASSISTANT

## 2021-05-27 RX ADMIN — GABAPENTIN 600 MG: 300 CAPSULE ORAL at 17:03

## 2021-05-27 RX ADMIN — BENZTROPINE MESYLATE 1 MG: 1 TABLET ORAL at 08:10

## 2021-05-27 RX ADMIN — METHOCARBAMOL TABLETS 500 MG: 500 TABLET, COATED ORAL at 11:58

## 2021-05-27 RX ADMIN — HALOPERIDOL 1 MG: 1 TABLET ORAL at 21:22

## 2021-05-27 RX ADMIN — LORAZEPAM 0.5 MG: 0.5 TABLET ORAL at 08:11

## 2021-05-27 RX ADMIN — HALOPERIDOL 1 MG: 1 TABLET ORAL at 11:57

## 2021-05-27 RX ADMIN — HALOPERIDOL 1 MG: 1 TABLET ORAL at 08:11

## 2021-05-27 RX ADMIN — GABAPENTIN 600 MG: 300 CAPSULE ORAL at 11:57

## 2021-05-27 RX ADMIN — ACETAMINOPHEN 975 MG: 325 TABLET ORAL at 11:57

## 2021-05-27 RX ADMIN — PSYLLIUM HUSK 1 PACKET: 3.4 POWDER ORAL at 08:10

## 2021-05-27 RX ADMIN — LORAZEPAM 0.5 MG: 0.5 TABLET ORAL at 21:22

## 2021-05-27 RX ADMIN — HALOPERIDOL 1 MG: 1 TABLET ORAL at 17:03

## 2021-05-27 RX ADMIN — GABAPENTIN 600 MG: 300 CAPSULE ORAL at 08:11

## 2021-05-27 RX ADMIN — LORAZEPAM 0.5 MG: 0.5 TABLET ORAL at 17:03

## 2021-05-27 RX ADMIN — GABAPENTIN 600 MG: 300 CAPSULE ORAL at 21:22

## 2021-05-27 RX ADMIN — OLANZAPINE 15 MG: 5 TABLET, FILM COATED ORAL at 08:10

## 2021-05-27 RX ADMIN — LORAZEPAM 0.5 MG: 0.5 TABLET ORAL at 11:57

## 2021-05-27 RX ADMIN — OLANZAPINE 10 MG: 10 TABLET, FILM COATED ORAL at 21:22

## 2021-05-27 RX ADMIN — LIDOCAINE 1 PATCH: 50 PATCH CUTANEOUS at 08:12

## 2021-05-27 RX ADMIN — SERTRALINE HYDROCHLORIDE 25 MG: 25 TABLET ORAL at 08:11

## 2021-05-27 RX ADMIN — BENZTROPINE MESYLATE 1 MG: 1 TABLET ORAL at 17:03

## 2021-05-27 RX ADMIN — NICOTINE 1 PATCH: 21 PATCH, EXTENDED RELEASE TRANSDERMAL at 08:13

## 2021-05-27 RX ADMIN — ESCITALOPRAM OXALATE 10 MG: 10 TABLET ORAL at 08:11

## 2021-05-27 RX ADMIN — FENOFIBRATE 145 MG: 145 TABLET ORAL at 08:11

## 2021-05-27 RX ADMIN — PANTOPRAZOLE SODIUM 40 MG: 40 TABLET, DELAYED RELEASE ORAL at 06:08

## 2021-05-27 RX ADMIN — DIPHENHYDRAMINE HCL 25 MG: 25 TABLET ORAL at 21:22

## 2021-05-27 RX ADMIN — PHENYTOIN 1 MG: 125 SUSPENSION ORAL at 21:22

## 2021-05-27 NOTE — PLAN OF CARE
Problem: Alteration in Thoughts and Perception  Goal: Verbalize thoughts and feelings  Description: Interventions:  - Promote a nonjudgmental and trusting relationship with the patient through active listening and therapeutic communication  - Assess patient's level of functioning, behavior and potential for risk  - Engage patient in 1 on 1 interactions  - Encourage patient to express fears, feelings, frustrations, and discuss symptoms    - [place] patient to reality, help patient recognize reality-based thinking   - Administer medications as ordered and assess for potential side effects  - Provide the patient education related to the signs and symptoms of the illness and desired effects of prescribed medications  Outcome: Progressing  Goal: Refrain from acting on delusional thinking/internal stimuli  Description: Interventions:  - Monitor patient closely, per order   - Utilize least restrictive measures   - Set reasonable limits, give positive feedback for acceptable   - Administer medications as ordered and monitor of potential side effects  Outcome: Progressing  Goal: Agree to be compliant with medication regime, as prescribed and report medication side effects  Description: Interventions:  - Offer appropriate PRN medication and supervise ingestion; conduct AIMS, as needed   Outcome: Progressing  Goal: Attend and participate in unit activities, including therapeutic, recreational, and educational groups  Description: Interventions:  -Encourage Visitation and family involvement in care  Outcome: Progressing  Goal: Complete daily ADLs, including personal hygiene independently, as able  Description: Interventions:  - Observe, teach, and assist patient with ADLS  - Monitor and promote a balance of rest/activity, with adequate nutrition and elimination   Outcome: Progressing     Problem: Ineffective Coping  Goal: Demonstrates healthy coping skills  Outcome: Progressing  Goal: Participates in unit activities  Description: Interventions:  - Provide therapeutic environment   - Provide required programming   - Redirect inappropriate behaviors   Outcome: Progressing  Goal: Understands least restrictive measures  Description: Interventions:  - Utilize least restrictive behavior  Outcome: Progressing     Problem: Risk for Self Injury/Neglect  Goal: Verbalize thoughts and feelings  Description: Interventions:  - Assess and re-assess patient's lethality and potential for self-injury  - Engage patient in 1:1 interactions, daily, for a minimum of 15 minutes  - Encourage patient to express feelings, fears, frustrations, hopes  - Establish rapport/trust with patient   Outcome: Progressing  Goal: Attend and participate in unit activities, including therapeutic, recreational, and educational groups  Description: Interventions:  - Provide therapeutic and educational activities daily, encourage attendance and participation, and document same in the medical record  - Obtain collateral information, encourage visitation and family involvement in care   Outcome: Progressing     Problem: Depression  Goal: Refrain from isolation  Description: Interventions:  - Develop a trusting relationship   - Encourage socialization   Outcome: Progressing  Goal: Attend and participate in unit activities, including therapeutic, recreational, and educational groups  Description: Interventions:  - Provide therapeutic and educational activities daily, encourage attendance and participation, and document same in the medical record   Outcome: Progressing     Problem: Anxiety  Goal: Anxiety is at manageable level  Description: Interventions:  - Assess and monitor patient's anxiety level  - Monitor for signs and symptoms (heart palpitations, chest pain, shortness of breath, headaches, nausea, feeling jumpy, restlessness, irritable, apprehensive)  - Collaborate with interdisciplinary team and initiate plan and interventions as ordered    - [place] patient to unit/surroundings  - Explain treatment plan  - Encourage participation in care  - Encourage verbalization of concerns/fears  - Identify coping mechanisms  - Assist in developing anxiety-reducing skills  - Administer/offer alternative therapies  - Limit or eliminate stimulants  Outcome: Progressing     Problem: Risk for Violence/Aggression Toward Others  Goal: Control angry outbursts  Description: Interventions:  - Monitor patient closely, per order  - Ensure early verbal de-escalation  - Monitor prn medication needs  - Set reasonable/therapeutic limits, outline behavioral expectations, and consequences   - Provide a non-threatening milieu, utilizing the least restrictive interventions   Outcome: Progressing  Goal: Attend and participate in unit activities, including therapeutic, recreational, and educational groups  Description: Interventions:  - Provide therapeutic and educational activities daily, encourage attendance and participation, and document same in the medical record   Outcome: Progressing     Problem: Alteration in Orientation  Goal: Interact with staff daily  Description: Interventions:  - Assess and re-assess patient's level of orientation  - Engage patient in 1 on 1 interactions, daily, for a minimum of 15 minutes   - Establish rapport/trust with patient   Outcome: Progressing

## 2021-05-27 NOTE — PROGRESS NOTES
05/27/21 1037   Team Meeting   Meeting Type Daily Rounds   Initial Conference Date 05/27/21   Patient/Family Present   Patient Present No   Patient's Family Present No       Daily Rounds Documentation  Team Members Participating  MD Audrey Echeverria, NEYDA Sewell, LSW  Marni Gowers, DECLANW  Norma Hinkle, CPS    Case reviewed  4/6 groups  Periods of brief yelling, but more contained  Haldol decreased  Cooperative, pleasant  Engages well in individual therapy and groups

## 2021-05-27 NOTE — NURSING NOTE
Patient is compliant with medication and meals Patient is fixated on her medication She constantly wants  Medication changes   She wants to try different medications  She also is very somatic she complains of pain in foot that happened 20  Years ago  She is also very med seeking   She continues to her delusional thinking at times also

## 2021-05-27 NOTE — NURSING NOTE
Received patient in bed at change of shift with eyes closed; chest movement noted  Continues the same thus this far as per q 7 min room checks  No issues or complaints noted

## 2021-05-27 NOTE — PROGRESS NOTES
Progress Note - Behavioral Health     Dl Miller 49 y o  female MRN: [id number]   [facility] Encounter: [id number]    Behavior over the last 24 hours: slowly improving  Kirsty Quintero   Is a 67-year-old female with a history of schizoaffective disorder bipolar type who presents for psychiatric follow-up  Patient is known to this interviewer from prior encounters at other hospitals  She is pleasant, calm and cooperative and brightens upon approach  Her thought processes appear well organized and she is forward thinking with improved insight into her psychiatric condition  She feels her medications have been helpful with maintaining stable mood and reducing psychotic outbursts  She denies any acute concerns  Recent EKG demonstrated a QTc of 490, as such her Haldol dosing was decreased      Sleep: normal  Appetite: normal  Medication side effects: No   ROS: all other systems are negative    Mental Status Evaluation:    Appearance:  age appropriate, casually dressed, adequate grooming   Behavior:  pleasant, cooperative, calm   Speech:  normal rate and volume, fluent, clear   Mood:  improved, euthymic   Affect:  reactive, slightly brighter   Thought Process:  organized, goal directed, linear   Associations: intact associations   Thought Content:  no overt delusions, paranoid delusions and ruminating thoughts are improving   Perceptual Disturbances: auditory hallucinations still present, but less frequent, no visual hallucinations   Risk Potential: Suicidal ideation - None at present, contracts for safety on the unit, would talk to staff if not feeling safe on the unit  Homicidal ideation - None at present  Potential for aggression - No   Sensorium:  oriented to person, place and time/date   Memory:  recent and remote memory grossly intact   Consciousness:  alert and awake   Attention/Concentration: attention span and concentration are age appropriate   Insight:  improving   Judgment: improving   Gait/Station: normal gait/station, normal balance   Motor Activity: no abnormal movements     Vital signs in last 24 hours:    Temp:  [97 7 °F (36 5 °C)] 97 7 °F (36 5 °C)  HR:  [80-81] 80  Resp:  [17-18] 18  BP: ()/(53-64) 109/64    Laboratory results: I have personally reviewed all pertinent laboratory/tests results    Most Recent Labs:   Lab Results   Component Value Date    WBC 6 80 04/09/2021    RBC 4 32 04/09/2021    HGB 13 0 04/09/2021    HCT 39 5 04/09/2021     04/09/2021    RDW 14 3 04/09/2021    NEUTROABS 7 10 (H) 03/16/2021    SODIUM 139 05/03/2021    K 4 4 05/03/2021     05/03/2021    CO2 30 05/03/2021    BUN 14 05/03/2021    CREATININE 0 61 05/03/2021    GLUC 94 05/03/2021    CALCIUM 9 8 05/03/2021    AST 25 05/03/2021    ALT 18 05/03/2021    ALKPHOS 56 05/03/2021    TP 7 2 05/03/2021    ALB 4 2 05/03/2021    TBILI 0 25 05/03/2021    CHOLESTEROL 300 (H) 03/31/2021    HDL 57 03/31/2021    TRIG 658 (H) 03/31/2021    LDLCALC  03/31/2021      Comment:      Calculated LDL invalid, triglycerides >400 mg/dl    NONHDLC 147 08/23/2020    AMMONIA 28 10/27/2017    [id number] 3 810 04/09/2021    FREET4 0 89 03/24/2016    PREGUR negative 03/16/2021    PREGSERUM Negative 10/21/2019    HCG <2 00 04/10/2014    RPR Non-Reactive 03/31/2021    HGBA1C 5 0 08/06/2019    EAG 97 08/06/2019       Progress Toward Goals: progressing, attends groups, participates in milieu therapy, mood is stabilizing, depression is improving, not as psychotic    Assessment/Plan   Principal Problem:    Schizoaffective disorder, bipolar type ([facility] [address] )  Active Problems:    Post-traumatic stress disorder, chronic    Medical clearance for psychiatric admission    Mild intermittent asthma without complication    Ear problem, bilateral    Gastroesophageal reflux disease      Recommended Treatment:     Planned medication and treatment changes:     All current active medications have been reviewed  Encourage group therapy, milieu therapy and occupational therapy  Behavioral Health checks every 7 minutes    Continue current medications  EKG from 5/25/21 demonstrated at QTc of 474  Haldol dose was subsequently decreased to 1mg QID and Lexapro was decreased from 20mg to 10mg  Recheck EKG on Monday      Current Facility-Administered Medications   Medication Dose Route Frequency Provider Last Rate    acetaminophen  650 mg Oral Q6H PRN Minh Lama, CRNP      acetaminophen  650 mg Oral Q4H PRN Minh Lama, CRNP      acetaminophen  975 mg Oral Q6H PRN Isis Lama, CRNP      al mag oxide-diphenhydramine-lidocaine viscous  10 mL Swish & Swallow Q4H PRN April Hays, CRNP      albuterol  2 puff Inhalation Q6H PRN Isis Lama, CRNP      aluminum-magnesium hydroxide-simethicone  15 mL Oral Q4H PRN Jasmyn Deng, CRNP      benztropine  1 mg Intramuscular Q4H PRN Max 6/day Isis Lama, CRNP      benztropine  1 mg Oral Q4H PRN Max 6/day Isis Lama, CRNP      benztropine  1 mg Oral BID Cesar Stubbs MD      calcium carbonate  500 mg Oral TID PRN Minh Lama, CRNP      diphenhydrAMINE  25 mg Oral HS Isis aLma, CRNP      escitalopram  10 mg Oral Daily Lucille Maher MD      fenofibrate  145 mg Oral Daily Isis Lama, CRNP      gabapentin  600 mg Oral 4x Daily Isis Lama, CRNP      haloperidol  1 mg Oral 4x Daily Lucille Maher MD      haloperidol  5 mg Oral Q6H PRN Isis Lama, CRNP      hydrOXYzine HCL  25 mg Oral Q6H PRN Max 4/day Isis Lama, CRNP      hydrOXYzine HCL  50 mg Oral Q6H PRN Max 4/day Isis Lama, CRNP      lidocaine  1 patch Topical Daily Isis Lama, CRNP      LORazepam  1 mg Intramuscular Q6H PRN Lucille Maher MD      LORazepam  0 5 mg Oral 4x Daily Lucille Maher MD      magnesium hydroxide  30 mL Oral Daily PRN Minh Lama, CRNP      methocarbamol  500 mg Oral Q8H PRN Lucille Maher MD      nicotine  1 patch Transdermal Daily Tamica Reuben, CRNP      OLANZapine  2 5 mg Oral Q8H PRN Tamica Reuben, CRNP      OLANZapine  5 mg Oral Q3H PRN Tamica Reuben, CRNP      OLANZapine  7 5 mg Oral Q3H PRN Tamiac Reuben, CRNP      OLANZapine  10 mg Intramuscular Q3H PRN Tamica Reuben, CRNP      OLANZapine  5 mg Intramuscular Q3H PRN Tamica Reuben, CRNP      OLANZapine  10 mg Oral HS Lexis Myers MD      OLANZapine  15 mg Oral Daily Lexis Myers MD      paliperidone palmitate ER  234 mg Intramuscular Q30 Days Dmitriy Ornelas MD      pantoprazole  40 mg Oral Early Morning Melven Katring, JARED      Pramox-PE-Glycerin-Petrolatum  1 application Rectal 4x Daily PRN Lindsey Side Lodics, CRNP      prazosin  1 mg Oral HS Tamica Reuben, CRNP      psyllium  1 packet Oral Daily Jasmyn L Sowmya, CRNP      sertraline  25 mg Oral Daily Dmitriy Ornelas MD       Risks / Benefits of Treatment:    Risks, benefits, and possible side effects of medications explained to patient and patient verbalizes understanding and agreement for treatment  Counseling / Coordination of Care:    Patient's progress discussed with staff in treatment team meeting  Medications, treatment progress and treatment plan reviewed with patient      Laura Vora PA-C 05/27/21

## 2021-05-28 PROCEDURE — 99232 SBSQ HOSP IP/OBS MODERATE 35: CPT | Performed by: PSYCHIATRY & NEUROLOGY

## 2021-05-28 RX ADMIN — BENZTROPINE MESYLATE 1 MG: 1 TABLET ORAL at 08:44

## 2021-05-28 RX ADMIN — DIPHENHYDRAMINE HCL 25 MG: 25 TABLET ORAL at 21:06

## 2021-05-28 RX ADMIN — FENOFIBRATE 145 MG: 145 TABLET ORAL at 08:45

## 2021-05-28 RX ADMIN — PHENYTOIN 1 MG: 125 SUSPENSION ORAL at 21:05

## 2021-05-28 RX ADMIN — GABAPENTIN 600 MG: 300 CAPSULE ORAL at 12:54

## 2021-05-28 RX ADMIN — GABAPENTIN 600 MG: 300 CAPSULE ORAL at 17:19

## 2021-05-28 RX ADMIN — LORAZEPAM 0.5 MG: 0.5 TABLET ORAL at 21:05

## 2021-05-28 RX ADMIN — ESCITALOPRAM OXALATE 10 MG: 10 TABLET ORAL at 08:44

## 2021-05-28 RX ADMIN — LORAZEPAM 0.5 MG: 0.5 TABLET ORAL at 08:44

## 2021-05-28 RX ADMIN — LORAZEPAM 0.5 MG: 0.5 TABLET ORAL at 12:54

## 2021-05-28 RX ADMIN — HALOPERIDOL 1 MG: 1 TABLET ORAL at 12:54

## 2021-05-28 RX ADMIN — GABAPENTIN 600 MG: 300 CAPSULE ORAL at 08:44

## 2021-05-28 RX ADMIN — OLANZAPINE 10 MG: 10 TABLET, FILM COATED ORAL at 21:05

## 2021-05-28 RX ADMIN — HALOPERIDOL 1 MG: 1 TABLET ORAL at 21:06

## 2021-05-28 RX ADMIN — SERTRALINE HYDROCHLORIDE 25 MG: 25 TABLET ORAL at 08:44

## 2021-05-28 RX ADMIN — BENZTROPINE MESYLATE 1 MG: 1 TABLET ORAL at 17:19

## 2021-05-28 RX ADMIN — NICOTINE 1 PATCH: 21 PATCH, EXTENDED RELEASE TRANSDERMAL at 08:46

## 2021-05-28 RX ADMIN — METHOCARBAMOL TABLETS 500 MG: 500 TABLET, COATED ORAL at 14:12

## 2021-05-28 RX ADMIN — PSYLLIUM HUSK 1 PACKET: 3.4 POWDER ORAL at 08:45

## 2021-05-28 RX ADMIN — OLANZAPINE 2.5 MG: 5 TABLET, ORALLY DISINTEGRATING ORAL at 14:14

## 2021-05-28 RX ADMIN — GABAPENTIN 600 MG: 300 CAPSULE ORAL at 21:05

## 2021-05-28 RX ADMIN — OLANZAPINE 15 MG: 5 TABLET, FILM COATED ORAL at 08:43

## 2021-05-28 RX ADMIN — HALOPERIDOL 1 MG: 1 TABLET ORAL at 08:44

## 2021-05-28 RX ADMIN — PANTOPRAZOLE SODIUM 40 MG: 40 TABLET, DELAYED RELEASE ORAL at 06:08

## 2021-05-28 RX ADMIN — LIDOCAINE 1 PATCH: 50 PATCH CUTANEOUS at 08:46

## 2021-05-28 RX ADMIN — LORAZEPAM 0.5 MG: 0.5 TABLET ORAL at 17:19

## 2021-05-28 RX ADMIN — HALOPERIDOL 1 MG: 1 TABLET ORAL at 17:19

## 2021-05-28 NOTE — PLAN OF CARE
Problem: Alteration in Thoughts and Perception  Goal: Verbalize thoughts and feelings  Description: Interventions:  - Promote a nonjudgmental and trusting relationship with the patient through active listening and therapeutic communication  - Assess patient's level of functioning, behavior and potential for risk  - Engage patient in 1 on 1 interactions  - Encourage patient to express fears, feelings, frustrations, and discuss symptoms    - [place] patient to reality, help patient recognize reality-based thinking   - Administer medications as ordered and assess for potential side effects  - Provide the patient education related to the signs and symptoms of the illness and desired effects of prescribed medications  5/28/2021 1421 by Manjeet Fontenot  Outcome: Progressing  5/28/2021 0955 by Manjeet Fontenot  Outcome: Progressing     Problem: Alteration in Thoughts and Perception  Goal: Attend and participate in unit activities, including therapeutic, recreational, and educational groups  Description: Interventions:  -Encourage Visitation and family involvement in care  Outcome: Progressing     Problem: Ineffective Coping  Goal: Identifies healthy coping skills  Outcome: Progressing     Problem: Ineffective Coping  Goal: Participates in unit activities  Description: Interventions:  - Provide therapeutic environment   - Provide required programming   - Redirect inappropriate behaviors   Outcome: Progressing    Patient completed WRAP Plan and was able to state her daughter, parents and community counselor support her wellness along with journal, reading, prayer and good rest  Respect for herself/others, boundaries, responsibilities and relationship are things that add meaning and purpose to her life  Patient described her baseline as quite, smiles often, eats healthy, good conversations with others, helpful with chores and errands   LM feels taking her medications, meeting with outside supports and eating healthy are things she needs to do everyday to keep herself well  Patient listed triggers as alcohol, drugs, unhealthy relationships and mental relapses  Patient's action plan for her triggers include avoidance when possible, think and prepare for weak moments, avoid people/places and things that cause triggers and avoiding angry and toxic people  Patient described her warning signs as increased depression, not taking care of herself, very little eating, not caring to make phone calls she usually makes and avoiding loved ones  Patient was able to list numerous community supports and list medications she prefers and ones she does not prefer to take along with valid reasons why  Patient listed alternatives to hospitalization and is educated on [facility] crisis [facility] in her area  Patient has been given a copy for discharge along with [facility] Relapse Prevention plan  See scanned document for more detail  Patient did very well in the completion of her WRAP Plan on her own

## 2021-05-28 NOTE — NURSING NOTE
Beryle Dolores is visible and social in the milieu  Appropriate, cooperative  Stated "My mood's been stable", "I wanna call my sister tonight, we don't talk often"  She denied depression, mild anxiety noted, denied A/V [place]/SI/HI  Appetite is good  Hygiene is good  Compliant w meds  Group participation encouraged  Declined ECG

## 2021-05-28 NOTE — PROGRESS NOTES
05/28/21 1158   Team Meeting   Meeting Type Daily Rounds   Initial Conference Date 05/28/21   Patient/Family Present   Patient Present No   Patient's Family Present No       Daily Rounds Documentation  Team Members Participating  [facility] YUDY Dick, [place]  Alba Fonseca [place]  Otilia Eye, CPS    Case reviewed  Still periods of yelling out, tends to be exacerbated by phone use as she yells out more following her long conversations on the phone  Has dental appointment today  5/5 groups

## 2021-05-28 NOTE — PROGRESS NOTES
05/28/21 1100   Activity/Group Checklist   Group Other (Comment)  (Recovery Management: How trauma affects the brain)   Attendance Other (Comment)  (Excused; on a pass)

## 2021-05-28 NOTE — NURSING NOTE
Jacinda Luther has been awake, alert, and visible intermittently out in the milieu  Yelled out from room x 1 this shift  Pleasant and cooperative  Pt ate 100% supper  Attended and participated in therapeutic activities group out on deck with staff and peers and evening wrap up group  Had evening snack  Compliant with scheduled meds when called  Has not verbalized any depression, anxiety, but still delusional at times  Had evening snack  Continue to monitor/assess for any changes

## 2021-05-28 NOTE — PLAN OF CARE
Problem: Alteration in Thoughts and Perception  Goal: Verbalize thoughts and feelings  Description: Interventions:  - Promote a nonjudgmental and trusting relationship with the patient through active listening and therapeutic communication  - Assess patient's level of functioning, behavior and potential for risk  - Engage patient in 1 on 1 interactions  - Encourage patient to express fears, feelings, frustrations, and discuss symptoms    - [place] patient to reality, help patient recognize reality-based thinking   - Administer medications as ordered and assess for potential side effects  - Provide the patient education related to the signs and symptoms of the illness and desired effects of prescribed medications  Outcome: Progressing     Problem: Alteration in Thoughts and Perception  Goal: Attend and participate in unit activities, including therapeutic, recreational, and educational groups  Description: Interventions:  -Encourage Visitation and family involvement in care  Outcome: Progressing    Patient completed Goal Card for the week of 5/31/21  Goals: Attend all groups             Be more social            Contact outside counselors

## 2021-05-28 NOTE — PROGRESS NOTES
Psychiatry Progress Note [facility] 49 y o  female MRN: [id number]  Unit/Bed#: [facility] 897-59 Encounter: [id number]  Code Status: Level 1 - Full Code    PCP: Mic Mchugh DO    Date of Admission:  4/7/2021 1435   Date of Service:  05/28/21    Patient Active Problem List   Diagnosis    [facility]    Uncomplicated alcohol dependence ([facility] )    Suicidal behavior    LYNN (generalized anxiety disorder)    Slow transit constipation    Deficiency of vitamin B    Degenerative disc disease, lumbar    Post-traumatic stress disorder, chronic    Lower extremity weakness    Generalized abdominal pain    Non-intractable vomiting    Medical clearance for psychiatric admission    Carbuncle    Alcohol dependence with unspecified alcohol-induced disorder ([facility] )    Mild intermittent asthma without complication    Tobacco abuse    Ear problem, bilateral    Gastroesophageal reflux disease     Review of systems:     Unremarkable but somatically preoccupied focused on pain on her feet from  20 years ago  Diagnosis:          Schizoaffective bipolar, alcohol use disorder episodic in early remission and controlled environment  ,Assessment   Overall Status:       Occasional verbal outburst friendly pleasant cooperative   Certification Statement: The patient will continue to require additional inpatient hospital stay due to ongoing mood swings paranoia   Acceptance by patient:  accepting    Hopefulness in recovery:  Living in a group home   Understanding of medications: has good understanding   Involved in reintegration process:  Adjusting to the unit   Trusting in relationship with psychiatrist:  trusting   Justification for dual anti-psychotics:  Not applicable   Side effects from treatment: none    Medication changes    none  Medication Education;  Risks and S/E and precautions of all meds given to patient and she did verbalize an understanding Non-pharmacological treatments   Continue with individual, group, milieu and occupational therapy using recovery principles and psycho-education about accepting illness and the need for treatment  Safety   Safety and communication plan established to target dynamic risk factors discussed above  Discharge Plan     most likely to a group home with the act team again     Interval Progress   Occasional yelling from her room reported by staff  She was tolerating his recent knee Lexapro and how he he he heard is still somatically will call about he pain on her feet from 20  Years ago  Still occasionally paranoid about staff doing hypnosis on her but generally friendly and pleasant when approached  He understands that she needs to cut down on her yelling before we can refer her to a group home  she is attending some groups and is usually friendly pleasant when approached   Sleep:            good   appetite:         good   compliance with medications :    good   Side  Effects:                  None reported today   significant events:              Still with occasional yelling and screaming   group attendance:            attending most of the groups    Mental Status Exam  Appearance: age appropriate, casually dressed, looks older than stated age, underweight    Pleasant and cooperative when approached  Behavior: normal, pleasant, cooperative, appears anxious,  more friendly today and not confrontational  Accepting med changes including reduction of Haldol    Speech: fluent, clear, coherent, increased rate, hypertalkative,   Circumstantial pressured    Mood: depressed, anxious, euphoric  Affect: inappropriate, labile, reactive, slightly brighter  Thought Process: normal abstract reasoning, less prominent, tends to be pressured and circumstantial and preoccupied perseverating on discharge    Thought Content: some paranoia, mild paranoia, ideas of reference, but does appear as if paranoid    No current suicidal homicidal thoughts intent or plans verbalized  No phobias obsessions compulsions or distorted body perceptions elicited  Perceptual Disturbances: no auditory hallucinations, no visual hallucinations, To claim to hear voices but unable to elaborate what they tell her and claims to feel things are moving around her    Reports the voices are going away and denies having any at the time of the interview  Hx Risk Factors: chronic mood disorder, chronic psychotic symptoms, alcohol use, limted insight  Sensorium:          Who oriented to 3 spheres and to situation  Cognition: recent and remote memory grossly intact  Consciousness: alert and awake  Attention: attention span and concentration are age appropriate  Intellect: appears to be of average intelligence  Insight: improving  Judgement: improving  Motor Activity: no abnormal movements     Vitals  Temp:  [97 °F (36 1 °C)-98 2 °F (36 8 °C)] 98 2 °F (36 8 °C)  HR:  [] 101  Resp:  [16-18] 18  BP: (118-119)/(63-79) 118/79  No intake or output data in the 24 hours ending 05/28/21 0756    Lab Results:  [facility] Admission Reviewed     Current Facility-Administered Medications   Medication Dose Route Frequency Provider Last Rate    acetaminophen  650 mg Oral Q6H PRN Kathy Siad Lodics, CRNP      acetaminophen  650 mg Oral Q4H PRN Kathy Ana Porterics, CRNP      acetaminophen  975 mg Oral Q6H PRN Isis Lama, CRNP      al mag oxide-diphenhydramine-lidocaine viscous  10 mL Swish & Swallow Q4H PRN Fries Flatten, CRNP      albuterol  2 puff Inhalation Q6H PRN Isis N Germanics, CRNP      aluminum-magnesium hydroxide-simethicone  15 mL Oral Q4H PRN Jasmyn L Sowmya, CRNP      benztropine  1 mg Intramuscular Q4H PRN Max 6/day Isis N Germanics, CRNP      benztropine  1 mg Oral Q4H PRN Max 6/day Isis N Germanics, CRNP      benztropine  1 mg Oral BID Jasper Whitfield MD      calcium carbonate  500 mg Oral TID PRN Kathy Lama, CRNP      diphenhydrAMINE  25 mg Oral HS Isis Lama, CRNP      escitalopram  10 mg Oral Daily Brooke Mack MD      fenofibrate  145 mg Oral Daily Isis Lama, CRNP      gabapentin  600 mg Oral 4x Daily Isis Lama, CRNP      haloperidol  1 mg Oral 4x Daily Brooke Mack MD      haloperidol  5 mg Oral Q6H PRN Kamille Lama, CRNP      hydrOXYzine HCL  25 mg Oral Q6H PRN Max 4/day Isis Lama, CRNP      hydrOXYzine HCL  50 mg Oral Q6H PRN Max 4/day Isis Lama, CRNP      lidocaine  1 patch Topical Daily Isis Lama, CRNP      LORazepam  1 mg Intramuscular Q6H PRN Brooke Mack MD      LORazepam  0 5 mg Oral 4x Daily Brooke Mack MD      magnesium hydroxide  30 mL Oral Daily PRN Kamille Lama, CRNP      methocarbamol  500 mg Oral Q8H PRN Brooke Mack MD      nicotine  1 patch Transdermal Daily Sudarshan Ny, CRNP      OLANZapine  2 5 mg Oral Q8H PRN Sudarshan Ny, CRNP      OLANZapine  5 mg Oral Q3H PRN Sudarshan Ny, CRNP      OLANZapine  7 5 mg Oral Q3H PRN Sudarshan Ny, CRNP      OLANZapine  10 mg Intramuscular Q3H PRN Sudarshan Ny, CRNP      OLANZapine  5 mg Intramuscular Q3H PRN Sudarshan Ny, CRNP      OLANZapine  10 mg Oral HS Daxa Grier MD      OLANZapine  15 mg Oral Daily Daxa Grier MD      paliperidone palmitate ER  234 mg Intramuscular Q30 Days Brooke Mack MD      pantoprazole  40 mg Oral Early Morning Kellen Olivier, CRNP      Pramox-PE-Glycerin-Petrolatum  1 application Rectal 4x Daily PRN Kamille Lama, CRNP      prazosin  1 mg Oral HS Sudarshan Ny, CRKALLIE      psyllium  1 packet Oral Daily Jasmyn Deng, JARED      sertraline  25 mg Oral Daily Brooke Mack MD         Counseling / Coordination of Care: Total floor / unit time spent today 15 minutes   Greater than 50% of total time was spent with the patient and / or family counseling and / or somewhat receptive to supportive listening and teaching positive coping skills to deal with symptom mangement  Patient's Rights, confidentiality and exceptions to confidentiality, use of automated medical record, [facility] staff access to medical record, and consent to treatment reviewed  This note was  not shared with the patient because it might further aggravate her psychiatric condition  This note has been dictated

## 2021-05-28 NOTE — NURSING NOTE
Patient went to dentist appointment today in the morning  Patient was visible on the unit and stated that she needed PRN at 1412 for panic feeling and spasm in her R ankle  No yelling episodes this morning  Patient is friendly and pleasant

## 2021-05-29 PROCEDURE — 99232 SBSQ HOSP IP/OBS MODERATE 35: CPT | Performed by: NURSE PRACTITIONER

## 2021-05-29 RX ADMIN — LORAZEPAM 0.5 MG: 0.5 TABLET ORAL at 12:06

## 2021-05-29 RX ADMIN — GABAPENTIN 600 MG: 300 CAPSULE ORAL at 08:07

## 2021-05-29 RX ADMIN — DIPHENHYDRAMINE HCL 25 MG: 25 TABLET ORAL at 21:16

## 2021-05-29 RX ADMIN — BENZTROPINE MESYLATE 1 MG: 1 TABLET ORAL at 17:24

## 2021-05-29 RX ADMIN — LORAZEPAM 0.5 MG: 0.5 TABLET ORAL at 21:16

## 2021-05-29 RX ADMIN — LORAZEPAM 0.5 MG: 0.5 TABLET ORAL at 17:24

## 2021-05-29 RX ADMIN — PSYLLIUM HUSK 1 PACKET: 3.4 POWDER ORAL at 08:08

## 2021-05-29 RX ADMIN — PHENYTOIN 1 MG: 125 SUSPENSION ORAL at 21:16

## 2021-05-29 RX ADMIN — NICOTINE 1 PATCH: 21 PATCH, EXTENDED RELEASE TRANSDERMAL at 08:09

## 2021-05-29 RX ADMIN — HALOPERIDOL 1 MG: 1 TABLET ORAL at 08:05

## 2021-05-29 RX ADMIN — GABAPENTIN 600 MG: 300 CAPSULE ORAL at 12:05

## 2021-05-29 RX ADMIN — GABAPENTIN 600 MG: 300 CAPSULE ORAL at 17:24

## 2021-05-29 RX ADMIN — HALOPERIDOL 1 MG: 1 TABLET ORAL at 12:06

## 2021-05-29 RX ADMIN — SERTRALINE HYDROCHLORIDE 25 MG: 25 TABLET ORAL at 08:05

## 2021-05-29 RX ADMIN — OLANZAPINE 15 MG: 5 TABLET, FILM COATED ORAL at 08:06

## 2021-05-29 RX ADMIN — LORAZEPAM 0.5 MG: 0.5 TABLET ORAL at 08:06

## 2021-05-29 RX ADMIN — GABAPENTIN 600 MG: 300 CAPSULE ORAL at 21:16

## 2021-05-29 RX ADMIN — FENOFIBRATE 145 MG: 145 TABLET ORAL at 08:07

## 2021-05-29 RX ADMIN — BENZTROPINE MESYLATE 1 MG: 1 TABLET ORAL at 08:07

## 2021-05-29 RX ADMIN — LIDOCAINE 1 PATCH: 50 PATCH CUTANEOUS at 08:08

## 2021-05-29 RX ADMIN — ACETAMINOPHEN 975 MG: 325 TABLET ORAL at 20:58

## 2021-05-29 RX ADMIN — HALOPERIDOL 1 MG: 1 TABLET ORAL at 17:25

## 2021-05-29 RX ADMIN — ESCITALOPRAM OXALATE 10 MG: 10 TABLET ORAL at 08:06

## 2021-05-29 RX ADMIN — OLANZAPINE 10 MG: 10 TABLET, FILM COATED ORAL at 21:16

## 2021-05-29 RX ADMIN — HALOPERIDOL 1 MG: 1 TABLET ORAL at 21:16

## 2021-05-29 RX ADMIN — PANTOPRAZOLE SODIUM 40 MG: 40 TABLET, DELAYED RELEASE ORAL at 06:13

## 2021-05-29 NOTE — PROGRESS NOTES
05/28/21 1400   Activity/Group Checklist   Group Other (Comment)  (OPEN STUDIO Art Therapy/Social Interaction-Free Expression)   Attendance Attended   Attendance Duration (min) Greater than 60   Interactions Did not interact  (patient sat apart from group; observed only)   Affect/Mood Appropriate   Goals Achieved Able to listen to others

## 2021-05-29 NOTE — PROGRESS NOTES
Progress Note - Behavioral Health   Yakov Marrero 49 y o  female MRN: [id number]  [facility]-01 Encounter: [id number]    The patient was seen for continuing care and reviewed with treatment team     Schizoaffective disorder, bipolar type ([address] )    Vital signs in last 24 hours:  Temp:  [97 3 °F (36 3 °C)-98 9 °F (37 2 °C)] 98 9 °F (37 2 °C)  HR:  [] 103  Resp:  [18] 18  BP: (114-126)/(63-75) 114/63    Mental Status Evaluation:    Appearance Adequate hygiene and grooming   Behavior calm and cooperative   Mood euthymic   Speech Normal rate and volume   Affect appropriate and mood-congruent   Thought Processes Goal directed and coherent   Thought Content Does not verbalize delusional material   Perceptual Disturbances Denies hallucinations and does not appear to be responding to internal stimuli   Risk Potential Suicidal/Homicidal Ideation - No evidence of suicidal or homicidal ideation and Patient does not verbalize suicidal or homicidal ideation  Risk of Violence - No evidence of risk for violence found on assessment  Risk of Self Mutilation - No evidence of risk for self mutilation found on assessment   Sensorium oriented to person, place, time/date and situation   Cognition/Memory recent and remote memory grossly intact   Consciousness alert and awake   Attention/Concentration attention span and concentration are age appropriate   Insight fair   Judgement fair   Muscle Strength and Gait/Station normal muscle strength and normal muscle tone, normal gait/station and normal balance   Motor Activity no abnormal movements       Progress Toward Goals:  Patient observed walking the acharya  Patient is pleasant on approach  Denies all psychiatric symptoms  States she is eating and sleeping well and has adequate daytime energy  Denies any medication issues  No issues reported by staff overnight  Recommended Treatment: Continue with pharmacotherapy, group therapy, milieu therapy and occupational therapy  The patient will be maintained on the following medications:  Current Facility-Administered Medications   Medication Dose Route Frequency Provider Last Rate    acetaminophen  650 mg Oral Q6H PRN Prosperbrendon Lama, CRNP      acetaminophen  650 mg Oral Q4H PRN Prosper Lama, CRNP      acetaminophen  975 mg Oral Q6H PRN Isis Lama, CRNP      al mag oxide-diphenhydramine-lidocaine viscous  10 mL Swish & Swallow Q4H PRN Anuradha Ly, CRNP      albuterol  2 puff Inhalation Q6H PRN Isis Lama, CRNP      aluminum-magnesium hydroxide-simethicone  15 mL Oral Q4H PRN Jasmyn L Sowmya, CRNP      benztropine  1 mg Intramuscular Q4H PRN Max 6/day Isis Lama, CRNP      benztropine  1 mg Oral Q4H PRN Max 6/day Isis Lama, CRNP      benztropine  1 mg Oral BID Nino Woodward MD      calcium carbonate  500 mg Oral TID PRN Prosper Lama, CRNP      diphenhydrAMINE  25 mg Oral HS Isis Lama, CRNP      escitalopram  10 mg Oral Daily Melanie Treadwell MD      fenofibrate  145 mg Oral Daily Isis Lama, CRNP      gabapentin  600 mg Oral 4x Daily Isis Lama, CRNP      haloperidol  1 mg Oral 4x Daily Melanie Treadwell MD      haloperidol  5 mg Oral Q6H PRN Isis Lama, CRNP      hydrOXYzine HCL  25 mg Oral Q6H PRN Max 4/day Isis Lama, CRNP      hydrOXYzine HCL  50 mg Oral Q6H PRN Max 4/day Isis Lama, CRNP      lidocaine  1 patch Topical Daily Isis Lama, CRNP      LORazepam  1 mg Intramuscular Q6H PRN Melanie Treadwell MD      LORazepam  0 5 mg Oral 4x Daily Melanie Treadwell MD      magnesium hydroxide  30 mL Oral Daily PRN Porsperbrendon Lama, CRNP      methocarbamol  500 mg Oral Q8H PRN Melanie Treadwell MD      nicotine  1 patch Transdermal Daily Duarte Pinery, CRNP      OLANZapine  2 5 mg Oral Q8H PRN Duarte Pizza, CRNP      OLANZapine  5 mg Oral Q3H PRN Duarte Pizza, CRNP      OLANZapine  7 5 mg Oral Q3H PRN Duarte Pizza, CRNP      OLANZapine  10 mg Intramuscular Q3H PRN Bell Nilson, JARED      OLANZapine  5 mg Intramuscular Q3H PRN Bell Nilson, JARED      OLANZapine  10 mg Oral HS Francesca Rojo MD      OLANZapine  15 mg Oral Daily Francesca Rojo MD      paliperidone palmitate ER  234 mg Intramuscular Q30 Days Magdalena Hatch MD      pantoprazole  40 mg Oral Early Morning JARED Thomas      Pramox-PE-Glycerin-Petrolatum  1 application Rectal 4x Daily PRN Bereangela Lama, JARED      prazosin  1 mg Oral HS Bell JARED Devine      psyllium  1 packet Oral Daily JARED Isbell      sertraline  25 mg Oral Daily Magdalena Hatch MD         Schizoaffective disorder, bipolar type ([facility] )

## 2021-05-29 NOTE — NURSING NOTE
Natalia Triplett has been quiet and visible intermittently on the unit  Pleasant and cooperative  Denies anxiety and depression  No angry outbursts  Little interaction with peers  Ate 100% of her meal  Went to her room after eating  Had to be prompted to come out for medication  Did take all pills  Showered tonight  She removed her Nicotine patch and Lidoderm patch at 1843 and brought them to the nurses station  Did writing in the dining room during movie time  Attended Wrap up group  Phone call tonight  Ate snack  Took HS medication  Tylenol 975mg po at 2058 for 7/10 right foot pain  She was asleep when this writer went to reassess her  Continue to monitor  Precautions maintained

## 2021-05-29 NOTE — PLAN OF CARE
Problem: Nutrition/Hydration-ADULT  Goal: Nutrient/Hydration intake appropriate for improving, restoring or maintaining nutritional needs  Description: Monitor and assess patient's nutrition/hydration status for malnutrition  Collaborate with interdisciplinary team and initiate plan and interventions as ordered  Monitor patient's weight and dietary intake as ordered or per policy  Utilize nutrition screening tool and intervene as necessary  Determine patient's food preferences and provide high-protein, high-caloric foods as appropriate       INTERVENTIONS:  - Monitor oral intake, urinary output, labs, and treatment plans  - Assess nutrition and hydration status and recommend course of action  - Evaluate amount of meals eaten  - Assist patient with eating if necessary   - Allow adequate time for meals  - Recommend/ encourage appropriate diets, oral nutritional supplements, and vitamin/mineral supplements  - Order, calculate, and assess calorie counts as needed  - Recommend, monitor, and adjust tube feedings and TPN/PPN based on assessed needs  - Assess need for intravenous fluids  - Provide specific nutrition/hydration education as appropriate  - Include patient/family/caregiver in decisions related to nutrition  Outcome: Progressing     Problem: Alteration in Thoughts and Perception  Goal: Verbalize thoughts and feelings  Description: Interventions:  - Promote a nonjudgmental and trusting relationship with the patient through active listening and therapeutic communication  - Assess patient's level of functioning, behavior and potential for risk  - Engage patient in 1 on 1 interactions  - Encourage patient to express fears, feelings, frustrations, and discuss symptoms    - Alto patient to reality, help patient recognize reality-based thinking   - Administer medications as ordered and assess for potential side effects  - Provide the patient education related to the signs and symptoms of the illness and desired effects of prescribed medications  Outcome: Progressing  Goal: Complete daily ADLs, including personal hygiene independently, as able  Description: Interventions:  - Observe, teach, and assist patient with ADLS  - Monitor and promote a balance of rest/activity, with adequate nutrition and elimination   Outcome: Progressing     Problem: Ineffective Coping  Goal: Participates in unit activities  Description: Interventions:  - Provide therapeutic environment   - Provide required programming   - Redirect inappropriate behaviors   Outcome: Progressing     Problem: Risk for Self Injury/Neglect  Goal: Verbalize thoughts and feelings  Description: Interventions:  - Assess and re-assess patient's lethality and potential for self-injury  - Engage patient in 1:1 interactions, daily, for a minimum of 15 minutes  - Encourage patient to express feelings, fears, frustrations, hopes  - Establish rapport/trust with patient   Outcome: Progressing  Goal: Attend and participate in unit activities, including therapeutic, recreational, and educational groups  Description: Interventions:  - Provide therapeutic and educational activities daily, encourage attendance and participation, and document same in the medical record  - Obtain collateral information, encourage visitation and family involvement in care   Outcome: Progressing

## 2021-05-29 NOTE — NURSING NOTE
Patient is compliant with medication and meals   She usually attends all her groups  She is social most of the time  Patient is also very medication seeking ,she has many somatic complaints   She also likes a lot of attention

## 2021-05-30 PROCEDURE — 99232 SBSQ HOSP IP/OBS MODERATE 35: CPT | Performed by: NURSE PRACTITIONER

## 2021-05-30 RX ADMIN — PHENYTOIN 1 MG: 125 SUSPENSION ORAL at 21:10

## 2021-05-30 RX ADMIN — OLANZAPINE 10 MG: 10 TABLET, FILM COATED ORAL at 21:09

## 2021-05-30 RX ADMIN — LORAZEPAM 0.5 MG: 0.5 TABLET ORAL at 21:09

## 2021-05-30 RX ADMIN — HALOPERIDOL 1 MG: 1 TABLET ORAL at 17:12

## 2021-05-30 RX ADMIN — PSYLLIUM HUSK 1 PACKET: 3.4 POWDER ORAL at 08:10

## 2021-05-30 RX ADMIN — LORAZEPAM 0.5 MG: 0.5 TABLET ORAL at 12:08

## 2021-05-30 RX ADMIN — NICOTINE 1 PATCH: 21 PATCH, EXTENDED RELEASE TRANSDERMAL at 08:11

## 2021-05-30 RX ADMIN — GABAPENTIN 600 MG: 300 CAPSULE ORAL at 17:12

## 2021-05-30 RX ADMIN — PANTOPRAZOLE SODIUM 40 MG: 40 TABLET, DELAYED RELEASE ORAL at 06:19

## 2021-05-30 RX ADMIN — DIPHENHYDRAMINE HCL 25 MG: 25 TABLET ORAL at 21:09

## 2021-05-30 RX ADMIN — METHOCARBAMOL TABLETS 500 MG: 500 TABLET, COATED ORAL at 12:23

## 2021-05-30 RX ADMIN — LIDOCAINE 1 PATCH: 50 PATCH CUTANEOUS at 08:09

## 2021-05-30 RX ADMIN — BENZTROPINE MESYLATE 1 MG: 1 TABLET ORAL at 17:12

## 2021-05-30 RX ADMIN — HALOPERIDOL 1 MG: 1 TABLET ORAL at 08:09

## 2021-05-30 RX ADMIN — LORAZEPAM 0.5 MG: 0.5 TABLET ORAL at 17:12

## 2021-05-30 RX ADMIN — BENZTROPINE MESYLATE 1 MG: 1 TABLET ORAL at 08:09

## 2021-05-30 RX ADMIN — GABAPENTIN 600 MG: 300 CAPSULE ORAL at 21:09

## 2021-05-30 RX ADMIN — ACETAMINOPHEN 650 MG: 325 TABLET ORAL at 17:28

## 2021-05-30 RX ADMIN — HALOPERIDOL 1 MG: 1 TABLET ORAL at 21:09

## 2021-05-30 RX ADMIN — LORAZEPAM 0.5 MG: 0.5 TABLET ORAL at 08:10

## 2021-05-30 RX ADMIN — FENOFIBRATE 145 MG: 145 TABLET ORAL at 08:09

## 2021-05-30 RX ADMIN — HALOPERIDOL 1 MG: 1 TABLET ORAL at 12:08

## 2021-05-30 RX ADMIN — GABAPENTIN 600 MG: 300 CAPSULE ORAL at 08:10

## 2021-05-30 RX ADMIN — OLANZAPINE 15 MG: 5 TABLET, FILM COATED ORAL at 08:10

## 2021-05-30 RX ADMIN — GABAPENTIN 600 MG: 300 CAPSULE ORAL at 12:08

## 2021-05-30 RX ADMIN — SERTRALINE HYDROCHLORIDE 25 MG: 25 TABLET ORAL at 08:10

## 2021-05-30 RX ADMIN — ESCITALOPRAM OXALATE 10 MG: 10 TABLET ORAL at 08:10

## 2021-05-30 NOTE — NURSING NOTE
Patient was received while in bed sleeping, no signs of distress noted throughout the night  Safety checks ongoing

## 2021-05-30 NOTE — PROGRESS NOTES
Progress Note - Behavioral Health   Reji Espinoza 49 y o  female MRN: [id number]  Unit/Bed#: [facility] 112-01 Encounter: [id number]    The patient was seen for continuing care and reviewed with treatment team     Schizoaffective disorder, bipolar type ([address] )    Vital signs in last 24 hours:  Temp:  [97 °F (36 1 °C)-98 2 °F (36 8 °C)] 98 2 °F (36 8 °C)  HR:  [] 89  Resp:  [17-18] 18  BP: (110-130)/(65-85) 121/65    Mental Status Evaluation:    Appearance Adequate hygiene and grooming   Behavior calm and cooperative   Mood euthymic   Speech Normal rate and volume   Affect appropriate and mood-congruent   Thought Processes Goal directed and coherent   Thought Content Does not verbalize delusional material   Perceptual Disturbances Denies hallucinations and does not appear to be responding to internal stimuli   Risk Potential Suicidal/Homicidal Ideation - No evidence of suicidal or homicidal ideation and Patient does not verbalize suicidal or homicidal ideation  Risk of Violence - No evidence of risk for violence found on assessment  Risk of Self Mutilation - No evidence of risk for self mutilation found on assessment   Sensorium oriented to person, place, time/date and situation   Cognition/Memory recent and remote memory grossly intact   Consciousness alert and awake   Attention/Concentration attention span and concentration are age appropriate   Insight fair   Judgement fair   Muscle Strength and Gait/Station normal muscle strength and normal muscle tone, normal gait/station and normal balance   Motor Activity no abnormal movements       Progress Toward Goals:  No significant changes overnight  Patient is observed using electronics  during electronics group  Eating and sleeping well  No medication issues  No issues reported by staff overnight  Recommended Treatment: Continue with pharmacotherapy, group therapy, milieu therapy and occupational therapy    The patient will be maintained on the following medications:  Current Facility-Administered Medications   Medication Dose Route Frequency Provider Last Rate    acetaminophen  650 mg Oral Q6H PRN Kamille Lama, CRNP      acetaminophen  650 mg Oral Q4H PRN Kamille Lama, CRNP      acetaminophen  975 mg Oral Q6H PRN Isis Lama, CRNP      al mag oxide-diphenhydramine-lidocaine viscous  10 mL Swish & Swallow Q4H PRN Kellen Olivier, CRNP      albuterol  2 puff Inhalation Q6H PRN Isis Lama, CRNP      aluminum-magnesium hydroxide-simethicone  15 mL Oral Q4H PRN Jasmyn Deng, CRNP      benztropine  1 mg Intramuscular Q4H PRN Max 6/day Isis Lama, CRNP      benztropine  1 mg Oral Q4H PRN Max 6/day Isis Lama, CRNP      benztropine  1 mg Oral BID Ashlie Calhoun MD      calcium carbonate  500 mg Oral TID PRN Kamille Lama, REANTANP      diphenhydrAMINE  25 mg Oral HS Isis Lama, CRNP      escitalopram  10 mg Oral Daily Brooke Mack MD      fenofibrate  145 mg Oral Daily Isis Lama, CRNP      gabapentin  600 mg Oral 4x Daily Isis Lama, CRNP      haloperidol  1 mg Oral 4x Daily Brooke Mack MD      haloperidol  5 mg Oral Q6H PRN Isis Lama, CRNP      hydrOXYzine HCL  25 mg Oral Q6H PRN Max 4/day Isis Lama, CRNP      hydrOXYzine HCL  50 mg Oral Q6H PRN Max 4/day Isis Lama, CRNP      lidocaine  1 patch Topical Daily Isis Lama, CRNP      LORazepam  1 mg Intramuscular Q6H PRN Brooke Mack MD      LORazepam  0 5 mg Oral 4x Daily Brooke Mack MD      magnesium hydroxide  30 mL Oral Daily PRN Kamille Lama, RENATANP      methocarbamol  500 mg Oral Q8H PRN Brooke Mack MD      nicotine  1 patch Transdermal Daily Sudarshan Ny, CRNP      OLANZapine  2 5 mg Oral Q8H PRN Sudarshan Ny, CRNP      OLANZapine  5 mg Oral Q3H PRN Sudarshan Ny, CRNP      OLANZapine  7 5 mg Oral Q3H PRN Sudarshan Ny, CRNP      OLANZapine  10 mg Intramuscular Q3H PRN Jennifer Cheeks Ava, JARED      OLANZapine  5 mg Intramuscular Q3H PRN Harl Dopp, CRKALLIE      OLANZapine  10 mg Oral HS Jeremiah Davenport MD      OLANZapine  15 mg Oral Daily Jeremiah Davenport MD      paliperidone palmitate ER  234 mg Intramuscular Q30 Days Lucille Maher MD      pantoprazole  40 mg Oral Early Morning Aprli Hays, JARED      Pramox-PE-Glycerin-Petrolatum  1 application Rectal 4x Daily PRN Minh Lama, JARED      prazosin  1 mg Oral HS Harl Dopp, JARED      psyllium  1 packet Oral Daily Jasmyn Deng, JARED      sertraline  25 mg Oral Daily Lucille Maher MD         Schizoaffective disorder, bipolar type ([facility] )

## 2021-05-30 NOTE — NURSING NOTE
Patient visible on the unit, social with staff and peers when approached  Patient pleasant and cooperative with shift assessment questions  Patient denying depression and anxiety, SI/HI/AH/VH  Patient states she feels in control of her illness, AH of voices appear at times but they are no longer command in nature  Patient medication and meal compliant, appetite good  PRN Tylenol given for R ankle pain at 17:28, patient states this was moderately effective  Will continue to monitor frequently and provide support as needed

## 2021-05-30 NOTE — PLAN OF CARE
Problem: Nutrition/Hydration-ADULT  Goal: Nutrient/Hydration intake appropriate for improving, restoring or maintaining nutritional needs  Description: Monitor and assess patient's nutrition/hydration status for malnutrition  Collaborate with interdisciplinary team and initiate plan and interventions as ordered  Monitor patient's weight and dietary intake as ordered or per policy  Utilize nutrition screening tool and intervene as necessary  Determine patient's food preferences and provide high-protein, high-caloric foods as appropriate       INTERVENTIONS:  - Monitor oral intake, urinary output, labs, and treatment plans  - Assess nutrition and hydration status and recommend course of action  - Evaluate amount of meals eaten  - Assist patient with eating if necessary   - Allow adequate time for meals  - Recommend/ encourage appropriate diets, oral nutritional supplements, and vitamin/mineral supplements  - Order, calculate, and assess calorie counts as needed  - Recommend, monitor, and adjust tube feedings and TPN/PPN based on assessed needs  - Assess need for intravenous fluids  - Provide specific nutrition/hydration education as appropriate  - Include patient/family/caregiver in decisions related to nutrition  Outcome: Progressing     Problem: Alteration in Thoughts and Perception  Goal: Treatment Goal: Gain control of psychotic behaviors/thinking, reduce/eliminate presenting symptoms and demonstrate improved reality functioning upon discharge  Outcome: Progressing  Goal: Verbalize thoughts and feelings  Description: Interventions:  - Promote a nonjudgmental and trusting relationship with the patient through active listening and therapeutic communication  - Assess patient's level of functioning, behavior and potential for risk  - Engage patient in 1 on 1 interactions  - Encourage patient to express fears, feelings, frustrations, and discuss symptoms    - [place] patient to reality, help patient recognize reality-based thinking   - Administer medications as ordered and assess for potential side effects  - Provide the patient education related to the signs and symptoms of the illness and desired effects of prescribed medications  Outcome: Progressing  Goal: Refrain from acting on delusional thinking/internal stimuli  Description: Interventions:  - Monitor patient closely, per order   - Utilize least restrictive measures   - Set reasonable limits, give positive feedback for acceptable   - Administer medications as ordered and monitor of potential side effects  Outcome: Progressing  Goal: Agree to be compliant with medication regime, as prescribed and report medication side effects  Description: Interventions:  - Offer appropriate PRN medication and supervise ingestion; conduct AIMS, as needed   Outcome: Progressing  Goal: Attend and participate in unit activities, including therapeutic, recreational, and educational groups  Description: Interventions:  -Encourage Visitation and family involvement in care  Outcome: Progressing  Goal: Recognize dysfunctional thoughts, communicate reality-based thoughts at the time of discharge  Description: Interventions:  - Provide medication and psycho-education to assist patient in compliance and developing insight into his/her illness   Outcome: Progressing  Goal: Complete daily ADLs, including personal hygiene independently, as able  Description: Interventions:  - Observe, teach, and assist patient with ADLS  - Monitor and promote a balance of rest/activity, with adequate nutrition and elimination   Outcome: Progressing     Problem: Ineffective Coping  Goal: Cooperates with admission process  Description: Interventions:   - Complete admission process  Outcome: Progressing  Goal: Identifies ineffective coping skills  Outcome: Progressing  Goal: Identifies healthy coping skills  Outcome: Progressing  Goal: Demonstrates healthy coping skills  Outcome: Progressing  Goal: Participates in unit activities  Description: Interventions:  - Provide therapeutic environment   - Provide required programming   - Redirect inappropriate behaviors   Outcome: Progressing  Goal: Patient/Family participate in treatment and DC plans  Description: Interventions:  - Provide therapeutic environment  Outcome: Progressing  Goal: Patient/Family verbalizes awareness of resources  Outcome: Progressing  Goal: Understands least restrictive measures  Description: Interventions:  - Utilize least restrictive behavior  Outcome: Progressing  Goal: Free from restraint events  Description: - Utilize least restrictive measures   - Provide behavioral interventions   - Redirect inappropriate behaviors   Outcome: Progressing     Problem: Risk for Self Injury/Neglect  Goal: Treatment Goal: Remain safe during length of stay, learn and adopt new coping skills, and be free of self-injurious ideation, impulses and acts at the time of discharge  Outcome: Progressing  Goal: Verbalize thoughts and feelings  Description: Interventions:  - Assess and re-assess patient's lethality and potential for self-injury  - Engage patient in 1:1 interactions, daily, for a minimum of 15 minutes  - Encourage patient to express feelings, fears, frustrations, hopes  - Establish rapport/trust with patient   Outcome: Progressing  Goal: Refrain from harming self  Description: Interventions:  - Monitor patient closely, per order  - Develop a trusting relationship  - Supervise medication ingestion, monitor effects and side effects   Outcome: Progressing  Goal: Attend and participate in unit activities, including therapeutic, recreational, and educational groups  Description: Interventions:  - Provide therapeutic and educational activities daily, encourage attendance and participation, and document same in the medical record  - Obtain collateral information, encourage visitation and family involvement in care   Outcome: Progressing  Goal: Recognize maladaptive responses and adopt new coping mechanisms  Outcome: Progressing  Goal: Complete daily ADLs, including personal hygiene independently, as able  Description: Interventions:  - Observe, teach, and assist patient with ADLS  - Monitor and promote a balance of rest/activity, with adequate nutrition and elimination  Outcome: Progressing     Problem: Depression  Goal: Treatment Goal: Demonstrate behavioral control of depressive symptoms, verbalize feelings of improved mood/affect, and adopt new coping skills prior to discharge  Outcome: Progressing  Goal: Verbalize thoughts and feelings  Description: Interventions:  - Assess and re-assess patient's level of risk   - Engage patient in 1:1 interactions, daily, for a minimum of 15 minutes   - Encourage patient to express feelings, fears, frustrations, hopes   Outcome: Progressing  Goal: Refrain from harming self  Description: Interventions:  - Monitor patient closely, per order   - Supervise medication ingestion, monitor effects and side effects   Outcome: Progressing  Goal: Refrain from isolation  Description: Interventions:  - Develop a trusting relationship   - Encourage socialization   Outcome: Progressing  Goal: Refrain from self-neglect  Outcome: Progressing  Goal: Attend and participate in unit activities, including therapeutic, recreational, and educational groups  Description: Interventions:  - Provide therapeutic and educational activities daily, encourage attendance and participation, and document same in the medical record   Outcome: Progressing  Goal: Complete daily ADLs, including personal hygiene independently, as able  Description: Interventions:  - Observe, teach, and assist patient with ADLS  -  Monitor and promote a balance of rest/activity, with adequate nutrition and elimination   Outcome: Progressing     Problem: Anxiety  Goal: Anxiety is at manageable level  Description: Interventions:  - Assess and monitor patient's anxiety level     - Monitor for signs and symptoms (heart palpitations, chest pain, shortness of breath, headaches, nausea, feeling jumpy, restlessness, irritable, apprehensive)  - Collaborate with interdisciplinary team and initiate plan and interventions as ordered    - [place] patient to unit/surroundings  - Explain treatment plan  - Encourage participation in care  - Encourage verbalization of concerns/fears  - Identify coping mechanisms  - Assist in developing anxiety-reducing skills  - Administer/offer alternative therapies  - Limit or eliminate stimulants  Outcome: Progressing     Problem: Risk for Violence/Aggression Toward Others  Goal: Treatment Goal: Refrain from acts of violence/aggression during length of stay, and demonstrate improved impulse control at the time of discharge  Outcome: Progressing  Goal: Verbalize thoughts and feelings  Description: Interventions:  - Assess and re-assess patient's level of risk, every waking shift  - Engage patient in 1:1 interactions, daily, for a minimum of 15 minutes   - Allow patient to express feelings and frustrations in a safe and non-threatening manner   - Establish rapport/trust with patient   Outcome: Progressing  Goal: Refrain from harming others  Outcome: Progressing  Goal: Refrain from destructive acts on the environment or property  Outcome: Progressing  Goal: Control angry outbursts  Description: Interventions:  - Monitor patient closely, per order  - Ensure early verbal de-escalation  - Monitor prn medication needs  - Set reasonable/therapeutic limits, outline behavioral expectations, and consequences   - Provide a non-threatening milieu, utilizing the least restrictive interventions   Outcome: Progressing  Goal: Attend and participate in unit activities, including therapeutic, recreational, and educational groups  Description: Interventions:  - Provide therapeutic and educational activities daily, encourage attendance and participation, and document same in the medical record   Outcome: Progressing  Goal: Identify appropriate positive anger management techniques  Description: Interventions:  - Offer anger management and coping skills groups   - Staff will provide positive feedback for appropriate anger control  Outcome: Progressing     Problem: Alteration in Orientation  Goal: Treatment Goal: Demonstrate a reduction of confusion and improved orientation to person, place, time and/or situation upon discharge, according to optimum baseline/ability  Outcome: Progressing  Goal: Interact with staff daily  Description: Interventions:  - Assess and re-assess patient's level of orientation  - Engage patient in 1 on 1 interactions, daily, for a minimum of 15 minutes   - Establish rapport/trust with patient   Outcome: Progressing  Goal: Express concerns related to confused thinking related to:  Description: Interventions:  - Encourage patient to express feelings, fears, frustrations, hopes  - Assign consistent caregivers   - [place]/re-orient patient as needed  - Allow comfort items, as appropriate  - Provide visual cues, signs, etc    Outcome: Progressing  Goal: Allow medical examinations, as recommended  Description: Interventions:  - Provide physical/neurological exams and/or referrals, per provider   Outcome: Progressing  Goal: Cooperate with recommended testing/procedures  Description: Interventions:  - Determine need for ancillary testing  - Observe for mental status changes  - Implement falls/precaution protocol   Outcome: Progressing  Goal: Attend and participate in unit activities, including therapeutic, recreational, and educational groups  Description: Interventions:  - Provide therapeutic and educational activities daily, encourage attendance and participation, and document same in the medical record   - Provide appropriate opportunities for reminiscence   - Provide a consistent daily routine   - Encourage family contact/visitation   Outcome: Progressing  Goal: Complete daily ADLs, including personal hygiene independently, as able  Description: Interventions:  - Observe, teach, and assist patient with ADLS  Outcome: Progressing     Problem: Individualized Interventions  Goal: Patient will verbalize appropriate use of telephone within 5 days  Description: Interventions:  - Treatment team to determine use of supervised phone privileges   Outcome: Progressing  Goal: Patient will verbalize need for hospitalization and will no longer attempt elopement within 5 days  Description: Interventions:  - Ongoing education to help patient understand need for hospitalization  Outcome: Progressing  Goal: Patient will recognize inappropriate behaviors and develop alternative behaviors within 5 days  Description: Interventions:  - Patient in collaboration with Treatment Team will develop a behavior management plan to help identify effective coping skills to deal with stressors  Outcome: Progressing

## 2021-05-30 NOTE — NURSING NOTE
Patient is compliant with medication and meals  She attends just about all her groups She is friendly and cooperative and smiling more today Patient has not been yelling out lately And her delusions are much less

## 2021-05-31 PROCEDURE — 99232 SBSQ HOSP IP/OBS MODERATE 35: CPT | Performed by: NURSE PRACTITIONER

## 2021-05-31 RX ADMIN — LORAZEPAM 0.5 MG: 0.5 TABLET ORAL at 21:18

## 2021-05-31 RX ADMIN — PSYLLIUM HUSK 1 PACKET: 3.4 POWDER ORAL at 08:09

## 2021-05-31 RX ADMIN — PHENYTOIN 1 MG: 125 SUSPENSION ORAL at 21:18

## 2021-05-31 RX ADMIN — GABAPENTIN 600 MG: 300 CAPSULE ORAL at 08:07

## 2021-05-31 RX ADMIN — LORAZEPAM 0.5 MG: 0.5 TABLET ORAL at 11:35

## 2021-05-31 RX ADMIN — GABAPENTIN 600 MG: 300 CAPSULE ORAL at 17:06

## 2021-05-31 RX ADMIN — LORAZEPAM 0.5 MG: 0.5 TABLET ORAL at 17:06

## 2021-05-31 RX ADMIN — GABAPENTIN 600 MG: 300 CAPSULE ORAL at 21:18

## 2021-05-31 RX ADMIN — OLANZAPINE 10 MG: 10 TABLET, FILM COATED ORAL at 21:18

## 2021-05-31 RX ADMIN — OLANZAPINE 15 MG: 5 TABLET, FILM COATED ORAL at 08:09

## 2021-05-31 RX ADMIN — PANTOPRAZOLE SODIUM 40 MG: 40 TABLET, DELAYED RELEASE ORAL at 06:09

## 2021-05-31 RX ADMIN — BENZTROPINE MESYLATE 1 MG: 1 TABLET ORAL at 17:06

## 2021-05-31 RX ADMIN — ACETAMINOPHEN 975 MG: 325 TABLET ORAL at 18:13

## 2021-05-31 RX ADMIN — HALOPERIDOL 1 MG: 1 TABLET ORAL at 21:18

## 2021-05-31 RX ADMIN — GABAPENTIN 600 MG: 300 CAPSULE ORAL at 11:35

## 2021-05-31 RX ADMIN — SERTRALINE HYDROCHLORIDE 25 MG: 25 TABLET ORAL at 08:08

## 2021-05-31 RX ADMIN — NICOTINE 1 PATCH: 21 PATCH, EXTENDED RELEASE TRANSDERMAL at 08:11

## 2021-05-31 RX ADMIN — METHOCARBAMOL TABLETS 500 MG: 500 TABLET, COATED ORAL at 11:35

## 2021-05-31 RX ADMIN — ESCITALOPRAM OXALATE 10 MG: 10 TABLET ORAL at 08:08

## 2021-05-31 RX ADMIN — HALOPERIDOL 1 MG: 1 TABLET ORAL at 08:08

## 2021-05-31 RX ADMIN — BENZTROPINE MESYLATE 1 MG: 1 TABLET ORAL at 08:08

## 2021-05-31 RX ADMIN — GLYCERIN, PETROLATUM, PHENYLEPHRINE HCL, PRAMOXINE HCL 1 APPLICATION: 144; 2.5; 10; 15 CREAM TOPICAL at 20:00

## 2021-05-31 RX ADMIN — DIPHENHYDRAMINE HCL 25 MG: 25 TABLET ORAL at 21:18

## 2021-05-31 RX ADMIN — FENOFIBRATE 145 MG: 145 TABLET ORAL at 08:11

## 2021-05-31 RX ADMIN — LIDOCAINE 1 PATCH: 50 PATCH CUTANEOUS at 08:09

## 2021-05-31 RX ADMIN — HALOPERIDOL 1 MG: 1 TABLET ORAL at 17:06

## 2021-05-31 RX ADMIN — LORAZEPAM 0.5 MG: 0.5 TABLET ORAL at 08:09

## 2021-05-31 RX ADMIN — HALOPERIDOL 1 MG: 1 TABLET ORAL at 11:35

## 2021-05-31 NOTE — NURSING NOTE
Quiet, cooperative and visible intermittently  Responding and loud outburst right before lunch  Noon meds included for anxiety and responding, given at 1135  PRN Robaxin also given for muscle spasms  Effective for reduced behavior and reduced spasms in afternoon  Med and meal compliant  Denies depression, anxiety, SI, HI and pain  Maintained on safe precautions without incident    Will continue to monitor progress in recovery program

## 2021-05-31 NOTE — PROGRESS NOTES
Progress Note - Behavioral Health   Sincere Perdomo 49 y o  female MRN: [id number]  [facility] Encounter: [id number]    The patient was seen for continuing care and reviewed with treatment team     Schizoaffective disorder, bipolar type ([address] )    Vital signs in last 24 hours:  Temp:  [97 5 °F (36 4 °C)-97 8 °F (36 6 °C)] 97 5 °F (36 4 °C)  HR:  [] 83  Resp:  [17] 17  BP: (107-110)/(64-68) 110/68    Mental Status Evaluation:    Appearance Adequate hygiene and grooming   Behavior calm and cooperative   Mood euthymic   Speech Normal rate and volume   Affect appropriate and mood-congruent   Thought Processes Goal directed and coherent   Thought Content Does not verbalize delusional material   Perceptual Disturbances Denies hallucinations and does not appear to be responding to internal stimuli   Risk Potential Suicidal/Homicidal Ideation - No evidence of suicidal or homicidal ideation and Patient does not verbalize suicidal or homicidal ideation  Risk of Violence - No evidence of risk for violence found on assessment  Risk of Self Mutilation - No evidence of risk for self mutilation found on assessment   Sensorium oriented to person, place, time/date and situation   Cognition/Memory recent and remote memory grossly intact   Consciousness alert and awake   Attention/Concentration attention span and concentration are age appropriate   Insight fair   Judgement fair   Muscle Strength and Gait/Station normal muscle strength and normal muscle tone, normal gait/station and normal balance   Motor Activity no abnormal movements       Progress Toward Goals:   No significant change overnight  Patient is observed walking in the hallway  Denies any psychiatric symptoms  States he is eating and sleeping well  Denies any medication issues  No issues reported by staff  Recommended Treatment: Continue with pharmacotherapy, group therapy, milieu therapy and occupational therapy    The patient will be maintained on the following medications:  Current Facility-Administered Medications   Medication Dose Route Frequency Provider Last Rate    acetaminophen  650 mg Oral Q6H PRN Kathy Lama, CRNP      acetaminophen  650 mg Oral Q4H PRN Kathy Lama, CRNP      acetaminophen  975 mg Oral Q6H PRN Isis Lama, CRNP      al mag oxide-diphenhydramine-lidocaine viscous  10 mL Swish & Swallow Q4H PRN Winifred Flatten, CRNP      albuterol  2 puff Inhalation Q6H PRN Isis Lama, CRNP      aluminum-magnesium hydroxide-simethicone  15 mL Oral Q4H PRN Jasmyn L Sowmya, CRNP      benztropine  1 mg Intramuscular Q4H PRN Max 6/day Isis Lama, CRNP      benztropine  1 mg Oral Q4H PRN Max 6/day Isis Lama, CRNP      benztropine  1 mg Oral BID Jasper Whitfield MD      calcium carbonate  500 mg Oral TID PRN Kathy Lama, CRNP      diphenhydrAMINE  25 mg Oral HS Isis Lama, CRNP      escitalopram  10 mg Oral Daily Geoff Cowan MD      fenofibrate  145 mg Oral Daily Isis Lama, CRNP      gabapentin  600 mg Oral 4x Daily Isis Lama, CRNP      haloperidol  1 mg Oral 4x Daily Geoff Cowan MD      haloperidol  5 mg Oral Q6H PRN Isis Lama, CRNP      hydrOXYzine HCL  25 mg Oral Q6H PRN Max 4/day Isis Lama, CRNP      hydrOXYzine HCL  50 mg Oral Q6H PRN Max 4/day Isis Lama, CRNP      lidocaine  1 patch Topical Daily Isis Lama, CRNP      LORazepam  1 mg Intramuscular Q6H PRN Geoff Cowan MD      LORazepam  0 5 mg Oral 4x Daily Geoff Cowan MD      magnesium hydroxide  30 mL Oral Daily PRN Kathy Lama, CRNP      methocarbamol  500 mg Oral Q8H PRN Geoff Cowan MD      nicotine  1 patch Transdermal Daily Jo Primrose, CRNP      OLANZapine  2 5 mg Oral Q8H PRN Jo Primrose, CRNP      OLANZapine  5 mg Oral Q3H PRN Jo Primrose, CRNP      OLANZapine  7 5 mg Oral Q3H PRN Jo Primrose, CRNP      OLANZapine  10 mg Intramuscular Q3H PRN Raquel Barrera, JARED      OLANZapine  5 mg Intramuscular Q3H PRN Raquel Barrera, JARED      OLANZapine  10 mg Oral HS Mary Pacheco MD      OLANZapine  15 mg Oral Daily Mary Pacheco MD      paliperidone palmitate ER  234 mg Intramuscular Q30 Days Jenna Lind MD      pantoprazole  40 mg Oral Early Morning JARED Burleson      Pramox-PE-Glycerin-Petrolatum  1 application Rectal 4x Daily PRN Clay Lama, JARED      prazosin  1 mg Oral HS JARED Melvin      psyllium  1 packet Oral Daily Jasmyn Deng, JARED      sertraline  25 mg Oral Daily Jenna Lind MD         Schizoaffective disorder, bipolar type ([facility] [address] )

## 2021-05-31 NOTE — PLAN OF CARE
Problem: Nutrition/Hydration-ADULT  Goal: Nutrient/Hydration intake appropriate for improving, restoring or maintaining nutritional needs  Description: Monitor and assess patient's nutrition/hydration status for malnutrition  Collaborate with interdisciplinary team and initiate plan and interventions as ordered  Monitor patient's weight and dietary intake as ordered or per policy  Utilize nutrition screening tool and intervene as necessary  Determine patient's food preferences and provide high-protein, high-caloric foods as appropriate       INTERVENTIONS:  - Monitor oral intake, urinary output, labs, and treatment plans  - Assess nutrition and hydration status and recommend course of action  - Evaluate amount of meals eaten  - Assist patient with eating if necessary   - Allow adequate time for meals  - Recommend/ encourage appropriate diets, oral nutritional supplements, and vitamin/mineral supplements  - Order, calculate, and assess calorie counts as needed  - Recommend, monitor, and adjust tube feedings and TPN/PPN based on assessed needs  - Assess need for intravenous fluids  - Provide specific nutrition/hydration education as appropriate  - Include patient/family/caregiver in decisions related to nutrition  Outcome: Progressing     Problem: Alteration in Thoughts and Perception  Goal: Verbalize thoughts and feelings  Description: Interventions:  - Promote a nonjudgmental and trusting relationship with the patient through active listening and therapeutic communication  - Assess patient's level of functioning, behavior and potential for risk  - Engage patient in 1 on 1 interactions  - Encourage patient to express fears, feelings, frustrations, and discuss symptoms    - [place] patient to reality, help patient recognize reality-based thinking   - Administer medications as ordered and assess for potential side effects  - Provide the patient education related to the signs and symptoms of the illness and desired effects of prescribed medications  Outcome: Progressing  Goal: Agree to be compliant with medication regime, as prescribed and report medication side effects  Description: Interventions:  - Offer appropriate PRN medication and supervise ingestion; conduct AIMS, as needed   Outcome: Progressing  Goal: Attend and participate in unit activities, including therapeutic, recreational, and educational groups  Description: Interventions:  -Encourage Visitation and family involvement in care  Outcome: Progressing  Goal: Complete daily ADLs, including personal hygiene independently, as able  Description: Interventions:  - Observe, teach, and assist patient with ADLS  - Monitor and promote a balance of rest/activity, with adequate nutrition and elimination   Outcome: Progressing     Problem: Risk for Self Injury/Neglect  Goal: Treatment Goal: Remain safe during length of stay, learn and adopt new coping skills, and be free of self-injurious ideation, impulses and acts at the time of discharge  Outcome: Progressing  Goal: Verbalize thoughts and feelings  Description: Interventions:  - Assess and re-assess patient's lethality and potential for self-injury  - Engage patient in 1:1 interactions, daily, for a minimum of 15 minutes  - Encourage patient to express feelings, fears, frustrations, hopes  - Establish rapport/trust with patient   Outcome: Progressing  Goal: Refrain from harming self  Description: Interventions:  - Monitor patient closely, per order  - Develop a trusting relationship  - Supervise medication ingestion, monitor effects and side effects   Outcome: Progressing  Goal: Attend and participate in unit activities, including therapeutic, recreational, and educational groups  Description: Interventions:  - Provide therapeutic and educational activities daily, encourage attendance and participation, and document same in the medical record  - Obtain collateral information, encourage visitation and family involvement in care   Outcome: Progressing  Goal: Recognize maladaptive responses and adopt new coping mechanisms  Outcome: Progressing  Goal: Complete daily ADLs, including personal hygiene independently, as able  Description: Interventions:  - Observe, teach, and assist patient with ADLS  - Monitor and promote a balance of rest/activity, with adequate nutrition and elimination  Outcome: Progressing     Problem: Depression  Goal: Treatment Goal: Demonstrate behavioral control of depressive symptoms, verbalize feelings of improved mood/affect, and adopt new coping skills prior to discharge  Outcome: Progressing  Goal: Verbalize thoughts and feelings  Description: Interventions:  - Assess and re-assess patient's level of risk   - Engage patient in 1:1 interactions, daily, for a minimum of 15 minutes   - Encourage patient to express feelings, fears, frustrations, hopes   Outcome: Progressing  Goal: Refrain from harming self  Description: Interventions:  - Monitor patient closely, per order   - Supervise medication ingestion, monitor effects and side effects   Outcome: Progressing  Goal: Refrain from isolation  Description: Interventions:  - Develop a trusting relationship   - Encourage socialization   Outcome: Progressing  Goal: Refrain from self-neglect  Outcome: Progressing  Goal: Attend and participate in unit activities, including therapeutic, recreational, and educational groups  Description: Interventions:  - Provide therapeutic and educational activities daily, encourage attendance and participation, and document same in the medical record   Outcome: Progressing  Goal: Complete daily ADLs, including personal hygiene independently, as able  Description: Interventions:  - Observe, teach, and assist patient with ADLS  -  Monitor and promote a balance of rest/activity, with adequate nutrition and elimination   Outcome: Progressing     Problem: Anxiety  Goal: Anxiety is at manageable level  Description: Interventions:  - Assess and monitor patient's anxiety level  - Monitor for signs and symptoms (heart palpitations, chest pain, shortness of breath, headaches, nausea, feeling jumpy, restlessness, irritable, apprehensive)  - Collaborate with interdisciplinary team and initiate plan and interventions as ordered    - [place] patient to unit/surroundings  - Explain treatment plan  - Encourage participation in care  - Encourage verbalization of concerns/fears  - Identify coping mechanisms  - Assist in developing anxiety-reducing skills  - Administer/offer alternative therapies  - Limit or eliminate stimulants  Outcome: Progressing     Problem: Risk for Violence/Aggression Toward Others  Goal: Treatment Goal: Refrain from acts of violence/aggression during length of stay, and demonstrate improved impulse control at the time of discharge  Outcome: Progressing  Goal: Verbalize thoughts and feelings  Description: Interventions:  - Assess and re-assess patient's level of risk, every waking shift  - Engage patient in 1:1 interactions, daily, for a minimum of 15 minutes   - Allow patient to express feelings and frustrations in a safe and non-threatening manner   - Establish rapport/trust with patient   Outcome: Progressing  Goal: Refrain from harming others  Outcome: Progressing  Goal: Refrain from destructive acts on the environment or property  Outcome: Progressing  Goal: Attend and participate in unit activities, including therapeutic, recreational, and educational groups  Description: Interventions:  - Provide therapeutic and educational activities daily, encourage attendance and participation, and document same in the medical record   Outcome: Progressing  Goal: Identify appropriate positive anger management techniques  Description: Interventions:  - Offer anger management and coping skills groups   - Staff will provide positive feedback for appropriate anger control  Outcome: Progressing     Problem: Alteration in Orientation  Goal: Interact with staff daily  Description: Interventions:  - Assess and re-assess patient's level of orientation  - Engage patient in 1 on 1 interactions, daily, for a minimum of 15 minutes   - Establish rapport/trust with patient   Outcome: Progressing  Goal: Express concerns related to confused thinking related to:  Description: Interventions:  - Encourage patient to express feelings, fears, frustrations, hopes  - Assign consistent caregivers   - [place]/re-orient patient as needed  - Allow comfort items, as appropriate  - Provide visual cues, signs, etc    Outcome: Progressing  Goal: Allow medical examinations, as recommended  Description: Interventions:  - Provide physical/neurological exams and/or referrals, per provider   Outcome: Progressing  Goal: Cooperate with recommended testing/procedures  Description: Interventions:  - Determine need for ancillary testing  - Observe for mental status changes  - Implement falls/precaution protocol   Outcome: Progressing  Goal: Attend and participate in unit activities, including therapeutic, recreational, and educational groups  Description: Interventions:  - Provide therapeutic and educational activities daily, encourage attendance and participation, and document same in the medical record   - Provide appropriate opportunities for reminiscence   - Provide a consistent daily routine   - Encourage family contact/visitation   Outcome: Progressing  Goal: Complete daily ADLs, including personal hygiene independently, as able  Description: Interventions:  - Observe, teach, and assist patient with ADLS  Outcome: Progressing     Problem: Individualized Interventions  Goal: Patient will verbalize appropriate use of telephone within 5 days  Description: Interventions:  - Treatment team to determine use of supervised phone privileges   Outcome: Progressing     Problem: Alteration in Thoughts and Perception  Goal: Treatment Goal: Gain control of psychotic behaviors/thinking, reduce/eliminate presenting symptoms and demonstrate improved reality functioning upon discharge  Outcome: Not Progressing  Goal: Refrain from acting on delusional thinking/internal stimuli  Description: Interventions:  - Monitor patient closely, per order   - Utilize least restrictive measures   - Set reasonable limits, give positive feedback for acceptable   - Administer medications as ordered and monitor of potential side effects  Outcome: Not Progressing  Goal: Recognize dysfunctional thoughts, communicate reality-based thoughts at the time of discharge  Description: Interventions:  - Provide medication and psycho-education to assist patient in compliance and developing insight into his/her illness   Outcome: Not Progressing     Problem: Risk for Violence/Aggression Toward Others  Goal: Control angry outbursts  Description: Interventions:  - Monitor patient closely, per order  - Ensure early verbal de-escalation  - Monitor prn medication needs  - Set reasonable/therapeutic limits, outline behavioral expectations, and consequences   - Provide a non-threatening milieu, utilizing the least restrictive interventions   Outcome: Not Progressing     Problem: Alteration in Orientation  Goal: Treatment Goal: Demonstrate a reduction of confusion and improved orientation to person, place, time and/or situation upon discharge, according to optimum baseline/ability  Outcome: Not Progressing     Problem: Individualized Interventions  Goal: Patient will verbalize need for hospitalization and will no longer attempt elopement within 5 days  Description: Interventions:  - Ongoing education to help patient understand need for hospitalization  Outcome: Not Progressing  Goal: Patient will recognize inappropriate behaviors and develop alternative behaviors within 5 days  Description: Interventions:  - Patient in collaboration with Treatment Team will develop a behavior management plan to help identify effective coping skills to deal with stressors  Outcome: Not Progressing

## 2021-05-31 NOTE — PROGRESS NOTES
Pt visible at times in the milieu, loudly verbalizing delusions, stating she feels like she is being hypnotized and stating "It's not right what they do to people in here!"  Pt continued on the phone, speaking to someone and complaining "they have me in this crazy house and they're still doing it to me!"  Pt is tangential, hyperverbal, altered by internal stimuli  She was cooperative with medications

## 2021-06-01 LAB
ATRIAL RATE: 73 BPM
P AXIS: 52 DEGREES
PR INTERVAL: 148 MS
QRS AXIS: 23 DEGREES
QRSD INTERVAL: 76 MS
QT INTERVAL: 434 MS
QTC INTERVAL: 478 MS
T WAVE AXIS: 28 DEGREES
VENTRICULAR RATE: 73 BPM

## 2021-06-01 PROCEDURE — 93010 ELECTROCARDIOGRAM REPORT: CPT | Performed by: INTERNAL MEDICINE

## 2021-06-01 PROCEDURE — 93005 ELECTROCARDIOGRAM TRACING: CPT

## 2021-06-01 PROCEDURE — 99232 SBSQ HOSP IP/OBS MODERATE 35: CPT | Performed by: PSYCHIATRY & NEUROLOGY

## 2021-06-01 RX ADMIN — BENZTROPINE MESYLATE 1 MG: 1 TABLET ORAL at 17:08

## 2021-06-01 RX ADMIN — PANTOPRAZOLE SODIUM 40 MG: 40 TABLET, DELAYED RELEASE ORAL at 05:04

## 2021-06-01 RX ADMIN — LORAZEPAM 0.5 MG: 0.5 TABLET ORAL at 17:08

## 2021-06-01 RX ADMIN — HALOPERIDOL 1 MG: 1 TABLET ORAL at 17:08

## 2021-06-01 RX ADMIN — GABAPENTIN 600 MG: 300 CAPSULE ORAL at 08:56

## 2021-06-01 RX ADMIN — ESCITALOPRAM OXALATE 10 MG: 10 TABLET ORAL at 08:56

## 2021-06-01 RX ADMIN — NICOTINE 1 PATCH: 21 PATCH, EXTENDED RELEASE TRANSDERMAL at 08:57

## 2021-06-01 RX ADMIN — OLANZAPINE 15 MG: 5 TABLET, FILM COATED ORAL at 08:56

## 2021-06-01 RX ADMIN — FENOFIBRATE 145 MG: 145 TABLET ORAL at 08:59

## 2021-06-01 RX ADMIN — PSYLLIUM HUSK 1 PACKET: 3.4 POWDER ORAL at 08:58

## 2021-06-01 RX ADMIN — ALUMINUM HYDROXIDE, MAGNESIUM HYDROXIDE, AND SIMETHICONE 15 ML: 200; 200; 20 SUSPENSION ORAL at 12:38

## 2021-06-01 RX ADMIN — PHENYTOIN 1 MG: 125 SUSPENSION ORAL at 21:09

## 2021-06-01 RX ADMIN — LIDOCAINE 1 PATCH: 50 PATCH CUTANEOUS at 08:57

## 2021-06-01 RX ADMIN — OLANZAPINE 10 MG: 10 TABLET, FILM COATED ORAL at 21:08

## 2021-06-01 RX ADMIN — LORAZEPAM 0.5 MG: 0.5 TABLET ORAL at 12:37

## 2021-06-01 RX ADMIN — HALOPERIDOL 1 MG: 1 TABLET ORAL at 21:08

## 2021-06-01 RX ADMIN — DIPHENHYDRAMINE HCL 25 MG: 25 TABLET ORAL at 21:08

## 2021-06-01 RX ADMIN — HALOPERIDOL 1 MG: 1 TABLET ORAL at 08:57

## 2021-06-01 RX ADMIN — OLANZAPINE 5 MG: 5 TABLET, ORALLY DISINTEGRATING ORAL at 13:21

## 2021-06-01 RX ADMIN — GABAPENTIN 600 MG: 300 CAPSULE ORAL at 21:08

## 2021-06-01 RX ADMIN — BENZTROPINE MESYLATE 1 MG: 1 TABLET ORAL at 08:56

## 2021-06-01 RX ADMIN — LORAZEPAM 0.5 MG: 0.5 TABLET ORAL at 21:08

## 2021-06-01 RX ADMIN — HALOPERIDOL 1 MG: 1 TABLET ORAL at 12:38

## 2021-06-01 RX ADMIN — LORAZEPAM 0.5 MG: 0.5 TABLET ORAL at 08:56

## 2021-06-01 RX ADMIN — METHOCARBAMOL TABLETS 500 MG: 500 TABLET, COATED ORAL at 13:21

## 2021-06-01 RX ADMIN — SERTRALINE HYDROCHLORIDE 25 MG: 25 TABLET ORAL at 08:56

## 2021-06-01 RX ADMIN — GABAPENTIN 600 MG: 300 CAPSULE ORAL at 17:08

## 2021-06-01 RX ADMIN — GABAPENTIN 600 MG: 300 CAPSULE ORAL at 12:37

## 2021-06-01 NOTE — PLAN OF CARE
Problem: Nutrition/Hydration-ADULT  Goal: Nutrient/Hydration intake appropriate for improving, restoring or maintaining nutritional needs  Description: Monitor and assess patient's nutrition/hydration status for malnutrition  Collaborate with interdisciplinary team and initiate plan and interventions as ordered  Monitor patient's weight and dietary intake as ordered or per policy  Utilize nutrition screening tool and intervene as necessary  Determine patient's food preferences and provide high-protein, high-caloric foods as appropriate       INTERVENTIONS:  - Monitor oral intake, urinary output, labs, and treatment plans  - Assess nutrition and hydration status and recommend course of action  - Evaluate amount of meals eaten  - Assist patient with eating if necessary   - Allow adequate time for meals  - Recommend/ encourage appropriate diets, oral nutritional supplements, and vitamin/mineral supplements  - Order, calculate, and assess calorie counts as needed  - Recommend, monitor, and adjust tube feedings and TPN/PPN based on assessed needs  - Assess need for intravenous fluids  - Provide specific nutrition/hydration education as appropriate  - Include patient/family/caregiver in decisions related to nutrition  Outcome: Progressing     Problem: Alteration in Thoughts and Perception  Goal: Verbalize thoughts and feelings  Description: Interventions:  - Promote a nonjudgmental and trusting relationship with the patient through active listening and therapeutic communication  - Assess patient's level of functioning, behavior and potential for risk  - Engage patient in 1 on 1 interactions  - Encourage patient to express fears, feelings, frustrations, and discuss symptoms    - [place] patient to reality, help patient recognize reality-based thinking   - Administer medications as ordered and assess for potential side effects  - Provide the patient education related to the signs and symptoms of the illness and desired effects of prescribed medications  Outcome: Progressing  Goal: Agree to be compliant with medication regime, as prescribed and report medication side effects  Description: Interventions:  - Offer appropriate PRN medication and supervise ingestion; conduct AIMS, as needed   Outcome: Progressing  Goal: Attend and participate in unit activities, including therapeutic, recreational, and educational groups  Description: Interventions:  -Encourage Visitation and family involvement in care  Outcome: Progressing  Goal: Complete daily ADLs, including personal hygiene independently, as able  Description: Interventions:  - Observe, teach, and assist patient with ADLS  - Monitor and promote a balance of rest/activity, with adequate nutrition and elimination   Outcome: Progressing     Problem: Ineffective Coping  Goal: Cooperates with admission process  Description: Interventions:   - Complete admission process  Outcome: Progressing  Goal: Participates in unit activities  Description: Interventions:  - Provide therapeutic environment   - Provide required programming   - Redirect inappropriate behaviors   Outcome: Progressing  Goal: Patient/Family participate in treatment and DC plans  Description: Interventions:  - Provide therapeutic environment  Outcome: Progressing  Goal: Patient/Family verbalizes awareness of resources  Outcome: Progressing  Goal: Understands least restrictive measures  Description: Interventions:  - Utilize least restrictive behavior  Outcome: Progressing  Goal: Free from restraint events  Description: - Utilize least restrictive measures   - Provide behavioral interventions   - Redirect inappropriate behaviors   Outcome: Progressing     Problem: Risk for Self Injury/Neglect  Goal: Treatment Goal: Remain safe during length of stay, learn and adopt new coping skills, and be free of self-injurious ideation, impulses and acts at the time of discharge  Outcome: Progressing  Goal: Refrain from harming self  Description: Interventions:  - Monitor patient closely, per order  - Develop a trusting relationship  - Supervise medication ingestion, monitor effects and side effects   Outcome: Progressing  Goal: Attend and participate in unit activities, including therapeutic, recreational, and educational groups  Description: Interventions:  - Provide therapeutic and educational activities daily, encourage attendance and participation, and document same in the medical record  - Obtain collateral information, encourage visitation and family involvement in care   Outcome: Progressing  Goal: Recognize maladaptive responses and adopt new coping mechanisms  Outcome: Progressing  Goal: Complete daily ADLs, including personal hygiene independently, as able  Description: Interventions:  - Observe, teach, and assist patient with ADLS  - Monitor and promote a balance of rest/activity, with adequate nutrition and elimination  Outcome: Progressing     Problem: Depression  Goal: Treatment Goal: Demonstrate behavioral control of depressive symptoms, verbalize feelings of improved mood/affect, and adopt new coping skills prior to discharge  Outcome: Progressing  Goal: Verbalize thoughts and feelings  Description: Interventions:  - Assess and re-assess patient's level of risk   - Engage patient in 1:1 interactions, daily, for a minimum of 15 minutes   - Encourage patient to express feelings, fears, frustrations, hopes   Outcome: Progressing  Goal: Refrain from harming self  Description: Interventions:  - Monitor patient closely, per order   - Supervise medication ingestion, monitor effects and side effects   Outcome: Progressing  Goal: Refrain from isolation  Description: Interventions:  - Develop a trusting relationship   - Encourage socialization   Outcome: Progressing  Goal: Refrain from self-neglect  Outcome: Progressing  Goal: Attend and participate in unit activities, including therapeutic, recreational, and educational groups  Description: Interventions:  - Provide therapeutic and educational activities daily, encourage attendance and participation, and document same in the medical record   Outcome: Progressing  Goal: Complete daily ADLs, including personal hygiene independently, as able  Description: Interventions:  - Observe, teach, and assist patient with ADLS  -  Monitor and promote a balance of rest/activity, with adequate nutrition and elimination   Outcome: Progressing     Problem: Anxiety  Goal: Anxiety is at manageable level  Description: Interventions:  - Assess and monitor patient's anxiety level  - Monitor for signs and symptoms (heart palpitations, chest pain, shortness of breath, headaches, nausea, feeling jumpy, restlessness, irritable, apprehensive)  - Collaborate with interdisciplinary team and initiate plan and interventions as ordered    - [place] patient to unit/surroundings  - Explain treatment plan  - Encourage participation in care  - Encourage verbalization of concerns/fears  - Identify coping mechanisms  - Assist in developing anxiety-reducing skills  - Administer/offer alternative therapies  - Limit or eliminate stimulants  Outcome: Progressing     Problem: Risk for Violence/Aggression Toward Others  Goal: Treatment Goal: Refrain from acts of violence/aggression during length of stay, and demonstrate improved impulse control at the time of discharge  Outcome: Progressing  Goal: Verbalize thoughts and feelings  Description: Interventions:  - Assess and re-assess patient's level of risk, every waking shift  - Engage patient in 1:1 interactions, daily, for a minimum of 15 minutes   - Allow patient to express feelings and frustrations in a safe and non-threatening manner   - Establish rapport/trust with patient   Outcome: Progressing  Goal: Refrain from harming others  Outcome: Progressing  Goal: Refrain from destructive acts on the environment or property  Outcome: Progressing  Goal: Control angry outbursts  Description: Interventions:  - Monitor patient closely, per order  - Ensure early verbal de-escalation  - Monitor prn medication needs  - Set reasonable/therapeutic limits, outline behavioral expectations, and consequences   - Provide a non-threatening milieu, utilizing the least restrictive interventions   Outcome: Progressing  Goal: Attend and participate in unit activities, including therapeutic, recreational, and educational groups  Description: Interventions:  - Provide therapeutic and educational activities daily, encourage attendance and participation, and document same in the medical record   Outcome: Progressing  Goal: Identify appropriate positive anger management techniques  Description: Interventions:  - Offer anger management and coping skills groups   - Staff will provide positive feedback for appropriate anger control  Outcome: Progressing     Problem: Alteration in Orientation  Goal: Treatment Goal: Demonstrate a reduction of confusion and improved orientation to person, place, time and/or situation upon discharge, according to optimum baseline/ability  Outcome: Progressing  Goal: Interact with staff daily  Description: Interventions:  - Assess and re-assess patient's level of orientation  - Engage patient in 1 on 1 interactions, daily, for a minimum of 15 minutes   - Establish rapport/trust with patient   Outcome: Progressing  Goal: Express concerns related to confused thinking related to:  Description: Interventions:  - Encourage patient to express feelings, fears, frustrations, hopes  - Assign consistent caregivers   - [place]/re-orient patient as needed  - Allow comfort items, as appropriate  - Provide visual cues, signs, etc    Outcome: Progressing  Goal: Allow medical examinations, as recommended  Description: Interventions:  - Provide physical/neurological exams and/or referrals, per provider   Outcome: Progressing  Goal: Cooperate with recommended testing/procedures  Description: Interventions:  - Determine need for ancillary testing  - Observe for mental status changes  - Implement falls/precaution protocol   Outcome: Progressing  Goal: Attend and participate in unit activities, including therapeutic, recreational, and educational groups  Description: Interventions:  - Provide therapeutic and educational activities daily, encourage attendance and participation, and document same in the medical record   - Provide appropriate opportunities for reminiscence   - Provide a consistent daily routine   - Encourage family contact/visitation   Outcome: Progressing  Goal: Complete daily ADLs, including personal hygiene independently, as able  Description: Interventions:  - Observe, teach, and assist patient with ADLS  Outcome: Progressing     Problem: Individualized Interventions  Goal: Patient will verbalize appropriate use of telephone within 5 days  Description: Interventions:  - Treatment team to determine use of supervised phone privileges   Outcome: Progressing  Goal: Patient will verbalize need for hospitalization and will no longer attempt elopement within 5 days  Description: Interventions:  - Ongoing education to help patient understand need for hospitalization  Outcome: Progressing  Goal: Patient will recognize inappropriate behaviors and develop alternative behaviors within 5 days  Description: Interventions:  - Patient in collaboration with Treatment Team will develop a behavior management plan to help identify effective coping skills to deal with stressors  Outcome: Progressing     Problem: DISCHARGE PLANNING - CARE MANAGEMENT  Goal: Discharge to post-acute care or home with appropriate resources  Description: INTERVENTIONS:  - Conduct assessment to determine patient/family and health care team treatment goals, and need for post-acute services based on payer coverage, community resources, and patient preferences, and barriers to discharge  - Address psychosocial, clinical, and financial

## 2021-06-01 NOTE — NURSING NOTE
Patient was received in bed asleep   She continued to do so throughout the night with no signs of distress noted

## 2021-06-01 NOTE — PROGRESS NOTES
Psychiatry Progress Note [facility] 49 y o  female MRN: [id number]  Unit/Bed#: [facility] 742-25 Encounter: [id number]  Code Status: Level 1 - Full Code    PCP: Frank Bello DO    Date of Admission:  4/7/2021 1435   Date of Service:  06/01/21    Patient Active Problem List   Diagnosis    [facility]    Uncomplicated alcohol dependence ([facility] )    Suicidal behavior    LYNN (generalized anxiety disorder)    Slow transit constipation    Deficiency of vitamin B    Degenerative disc disease, lumbar    Post-traumatic stress disorder, chronic    Lower extremity weakness    Generalized abdominal pain    Non-intractable vomiting    Medical clearance for psychiatric admission    Carbuncle    Alcohol dependence with unspecified alcohol-induced disorder ([facility] )    Mild intermittent asthma without complication    Tobacco abuse    Ear problem, bilateral    Gastroesophageal reflux disease     Review of systems:     Unremarkable but needed preparation H for hemorrhoids   Diagnosis:          Schizoaffective bipolar, alcohol use in early remission   ,Assessment   Overall Status:       Occasional verbal outburst friendly pleasant cooperative   Certification Statement: The patient will continue to require additional inpatient hospital stay due to ongoing mood swings paranoia   Acceptance by patient:  accepting    Hopefulness in recovery:  Living in a group home   Understanding of medications: has good understanding   Involved in reintegration process:  Adjusting to the unit   Trusting in relationship with psychiatrist:  trusting   Justification for dual anti-psychotics:  Not applicable   Side effects from treatment: none    Medication changes   Stop Lexapro and increase Zoloft due to increase in QTc  And to repat EKG today   Medication Education;  Risks and S/E and precautions of all meds given to patient and she did verbalize an understanding Non-pharmacological treatments   Continue with individual, group, milieu and occupational therapy using recovery principles and psycho-education about accepting illness and the need for treatment  Safety   Safety and communication plan established to target dynamic risk factors discussed above  Discharge Plan     most likely to a group home with the act team again     Interval Progress   No voices or yelling for a few days per her, still feels paranoid about staff doing hypnosis on her but can be redirected, she also feels good with the decrease in po haldol and adding zoloft and decreas ein lexapro so far, able to attend groups, anxious about refreal to a group home for which she was already referred  friendlya nd pleasant and interacting better with staff and peers      Sleep:            good   appetite:         good   compliance with medications :    good   Side  Effects:                  None reported today    significant events:             No  yelling which has markedly decreased    group attendance:            Attending most of the groups     Mental Status Exam  Appearance: age appropriate, casually dressed, looks older than stated age, underweight    Pleasant and cooperative and attended team meeting   Behavior: normal, pleasant, cooperative, mildly anxious,  more friendly today and not confrontational  Accepting med changes    Speech: fluent, clear, coherent, increased rate, hypertalkative,   Circumstantial pressured    Mood: depressed, anxious, euphoric  Affect: labile, reactive, slightly brighter  Thought Process: normal abstract reasoning, less prominent, tends to be pressured and circumstantial and preoccupied perseverating on discharge    Thought Content: some paranoia, ideas of reference, but does appear as if paranoid  No current suicidal homicidal thoughts intent or plans verbalized  No phobias obsessions compulsions or distorted body perceptions elicited       Perceptual Disturbances: no auditory hallucinations, no visual hallucinations, denies auditory hallucinations when asked, does not appear responding to internal stimuli, no voices reported today       Hx Risk Factors: chronic mood disorder, chronic psychotic symptoms, alcohol use, limted insight  Sensorium:          Oriented to 3 spheres and to situation   Cognition: recent and remote memory grossly intact  Consciousness: alert and awake  Attention: attention span and concentration are age appropriate  Intellect: appears to be of average intelligence  Insight: improving  Judgement: improving  Motor Activity: no abnormal movements     Vitals  Temp:  [97 °F (36 1 °C)-97 5 °F (36 4 °C)] 97 5 °F (36 4 °C)  HR:  [76-85] 76  Resp:  [18] 18  BP: (111-129)/(63-89) 111/63  No intake or output data in the 24 hours ending 06/01/21 0957    Lab Results:  [facility] Admission Reviewed     Current Facility-Administered Medications   Medication Dose Route Frequency Provider Last Rate    acetaminophen  650 mg Oral Q6H PRN Ozella Wirt Lodics, CRNP      acetaminophen  650 mg Oral Q4H PRN Ozella Wirt Lodics, CRNP      acetaminophen  975 mg Oral Q6H PRN Isis Lama, CRNP      al mag oxide-diphenhydramine-lidocaine viscous  10 mL Swish & Swallow Q4H PRN Birder Mcardle, CRNP      albuterol  2 puff Inhalation Q6H PRN Isis Porterics, CRNP      aluminum-magnesium hydroxide-simethicone  15 mL Oral Q4H PRN Jsamyn Leeat, CRNP      benztropine  1 mg Intramuscular Q4H PRN Max 6/day Isis N Lodics, CRNP      benztropine  1 mg Oral Q4H PRN Max 6/day Isis Porterics, CRNP      benztropine  1 mg Oral BID Freddy Perez MD      calcium carbonate  500 mg Oral TID PRN Ozella Wirt Germanics, CRNP      diphenhydrAMINE  25 mg Oral HS Isis N Germanics, CRNP      fenofibrate  145 mg Oral Daily Isis N Germanics, CRNP      gabapentin  600 mg Oral 4x Daily Isis Lama, CRNP      haloperidol  1 mg Oral 4x Daily Tootie Ames MD  haloperidol  5 mg Oral Q6H PRN Isis Lama, CRNP      hydrOXYzine HCL  25 mg Oral Q6H PRN Max 4/day Isis Lama, CRNP      hydrOXYzine HCL  50 mg Oral Q6H PRN Max 4/day Isis Lama, CRNP      lidocaine  1 patch Topical Daily Isis Lama, CRNP      LORazepam  1 mg Intramuscular Q6H PRN Nerissa Sanchez MD      LORazepam  0 5 mg Oral 4x Daily Nerissa Sanchez MD      magnesium hydroxide  30 mL Oral Daily PRN Kimberly Lama, CRNP      methocarbamol  500 mg Oral Q8H PRN Nerissa Sanchez MD      nicotine  1 patch Transdermal Daily Liliam Lively, CRNP      OLANZapine  2 5 mg Oral Q8H PRN Liliam Lively, CRNP      OLANZapine  5 mg Oral Q3H PRN Liliam Lively, CRNP      OLANZapine  7 5 mg Oral Q3H PRN Liliam Lively, CRNP      OLANZapine  10 mg Intramuscular Q3H PRN Liliam Lively, CRNP      OLANZapine  5 mg Intramuscular Q3H PRN Liliam Lively, CRNP      OLANZapine  10 mg Oral HS Matteo Sanchez MD      OLANZapine  15 mg Oral Daily Matteo Sanchez MD      paliperidone palmitate ER  234 mg Intramuscular Q30 Days Nerissa Sanchez MD      pantoprazole  40 mg Oral Early Morning Nadia Hunt, JARED      Pramox-PE-Glycerin-Petrolatum  1 application Rectal 4x Daily PRN Kimberly Lama, CRNP      prazosin  1 mg Oral HS Liliam Lively, JARED      psyllium  1 packet Oral Daily JARED Isbell      [START ON 6/2/2021] sertraline  50 mg Oral Daily Nerissa Sanchez MD         Counseling / Coordination of Care: Total floor / unit time spent today 15 minutes  Greater than 50% of total time was spent with the patient and / or family counseling and / or somewhat receptive to supportive listening and teaching positive coping skills to deal with symptom mangement  Patient's Rights, confidentiality and exceptions to confidentiality, use of automated medical record, Howie Atkinson staff access to medical record, and consent to treatment reviewed     This note was  not shared with the patient because it might further aggravate her psychiatric condition  This note has been dictated

## 2021-06-01 NOTE — CASE MANAGEMENT
Requesting corin Howard transportation for patient's dental appointment at 12:30pm this Thursday (follow up)  Will confirm

## 2021-06-01 NOTE — PROGRESS NOTES
06/01/21 1319   Team Meeting   Meeting Type Daily Rounds   Initial Conference Date 06/01/21   Patient/Family Present   Patient Present No   Patient's Family Present No       Daily Rounds Documentation  Team Members Participating  MD Buck Torres, NEYDA Thibodeaux, MARK ANTHONY Hester, DECLANW  Wilbur Casarez, ANNALISE    Case reviewed  Still periods of yelling though minimal  Medication and meal compliant, attending most groups 6/6 Friday  Reported AH of voices to staff but vague and later denied  Less fixated and vocal about delusion of hypnotism

## 2021-06-01 NOTE — NURSING NOTE
Patient sitting by phone loudly "proclaiming we are all sinners "  Patient also stating "You all are creepy  Its creepy what you are doing to me "  Asked patient who was creepy and what is being don to her and patient reported "Are you blind?! Cant you see?!"  Patient was given 5 mg of Zyprexa, PO, PRN

## 2021-06-01 NOTE — NURSING NOTE
Susie Redhead has been visible, pleasant, and cooperative  Patient had 1 minor episodes of yelling however patient was easily redirectable  Patient social with select peers  Denies all psych symptoms  She is medication and meal compliant   Consumed 100% of dinner  Patient attended wrap up  group this shift  Continue to monitor

## 2021-06-01 NOTE — PROGRESS NOTES
06/01/21 1445   Team Meeting   Meeting Type Tx Team Meeting   Initial Conference Date 06/01/21   Next Conference Date 06/08/21   Team Members Present   Team Members Present Physician;;Nurse; Other (Discipline and Name)   Physician Team Member Dr Geoff Cowan MD   Nursing Team Member Maura Sultana LPN   Social Work Team Member Larry HOPSON   Other (Discipline and Name) [facility] [address]   Patient/Family Present   Patient Present Yes   Patient's Family Present No       Summary: Patient presented for her treatment team this morning with a completed self assessment  Pt is brighter upon approach, overall less irritable and anxious  Pt reported her barrier is mainly anxiety about the future, and her coping skills used have been walking, prayer and meditation  Patient also has been journaling and exploring different approaches to journaling to help her cope with anxiety and her psychotic symptoms, which she stated are improving  She also reported overall with the Sertraline she feels like she's in a "much better mood " Lexapro being decreased and Sertraline increased  Pt listed her medications and knows them well, denies medical issues and medication reactions  Reports good hygiene by practicing good self care, exercising and praying, being more mindful and meditating  Pt had opportunity to discuss her DC plan, which is to continue to stabilize with hopes of a bed opening for her at one of [facility] [address] locations (referral already placed, patient working on decreasing her yelling out and agitating / coping in other ways in anticipation of living with others)  Meeting ended mutually

## 2021-06-02 PROBLEM — M79.671 RIGHT FOOT PAIN: Status: ACTIVE | Noted: 2021-06-02

## 2021-06-02 PROCEDURE — 99231 SBSQ HOSP IP/OBS SF/LOW 25: CPT | Performed by: PHYSICIAN ASSISTANT

## 2021-06-02 PROCEDURE — 99232 SBSQ HOSP IP/OBS MODERATE 35: CPT | Performed by: PSYCHIATRY & NEUROLOGY

## 2021-06-02 RX ADMIN — FENOFIBRATE 145 MG: 145 TABLET ORAL at 08:33

## 2021-06-02 RX ADMIN — PHENYTOIN 1 MG: 125 SUSPENSION ORAL at 21:06

## 2021-06-02 RX ADMIN — ACETAMINOPHEN 975 MG: 325 TABLET ORAL at 11:42

## 2021-06-02 RX ADMIN — BENZTROPINE MESYLATE 1 MG: 1 TABLET ORAL at 08:33

## 2021-06-02 RX ADMIN — GABAPENTIN 600 MG: 300 CAPSULE ORAL at 21:06

## 2021-06-02 RX ADMIN — HALOPERIDOL 1 MG: 1 TABLET ORAL at 21:06

## 2021-06-02 RX ADMIN — HALOPERIDOL 1 MG: 1 TABLET ORAL at 08:33

## 2021-06-02 RX ADMIN — ACETAMINOPHEN 975 MG: 325 TABLET ORAL at 18:31

## 2021-06-02 RX ADMIN — DIPHENHYDRAMINE HCL 25 MG: 25 TABLET ORAL at 21:06

## 2021-06-02 RX ADMIN — PSYLLIUM HUSK 1 PACKET: 3.4 POWDER ORAL at 08:32

## 2021-06-02 RX ADMIN — GABAPENTIN 600 MG: 300 CAPSULE ORAL at 11:42

## 2021-06-02 RX ADMIN — LIDOCAINE 1 PATCH: 50 PATCH CUTANEOUS at 08:34

## 2021-06-02 RX ADMIN — HALOPERIDOL 1 MG: 1 TABLET ORAL at 17:10

## 2021-06-02 RX ADMIN — PANTOPRAZOLE SODIUM 40 MG: 40 TABLET, DELAYED RELEASE ORAL at 06:20

## 2021-06-02 RX ADMIN — BENZTROPINE MESYLATE 1 MG: 1 TABLET ORAL at 17:10

## 2021-06-02 RX ADMIN — GABAPENTIN 600 MG: 300 CAPSULE ORAL at 08:33

## 2021-06-02 RX ADMIN — NICOTINE 1 PATCH: 21 PATCH, EXTENDED RELEASE TRANSDERMAL at 08:34

## 2021-06-02 RX ADMIN — HALOPERIDOL 1 MG: 1 TABLET ORAL at 11:42

## 2021-06-02 RX ADMIN — OLANZAPINE 15 MG: 5 TABLET, FILM COATED ORAL at 08:32

## 2021-06-02 RX ADMIN — SERTRALINE HYDROCHLORIDE 50 MG: 50 TABLET ORAL at 08:33

## 2021-06-02 RX ADMIN — LORAZEPAM 0.5 MG: 0.5 TABLET ORAL at 17:10

## 2021-06-02 RX ADMIN — LORAZEPAM 0.5 MG: 0.5 TABLET ORAL at 11:42

## 2021-06-02 RX ADMIN — OLANZAPINE 10 MG: 10 TABLET, FILM COATED ORAL at 21:06

## 2021-06-02 RX ADMIN — LORAZEPAM 0.5 MG: 0.5 TABLET ORAL at 08:33

## 2021-06-02 RX ADMIN — LORAZEPAM 0.5 MG: 0.5 TABLET ORAL at 21:06

## 2021-06-02 RX ADMIN — GABAPENTIN 600 MG: 300 CAPSULE ORAL at 17:10

## 2021-06-02 NOTE — PROGRESS NOTES
Attempted to meet with patient for individual session; but shew as sound asleep and did not wake when her name was called or gently touched

## 2021-06-02 NOTE — PLAN OF CARE
Problem: Nutrition/Hydration-ADULT  Goal: Nutrient/Hydration intake appropriate for improving, restoring or maintaining nutritional needs  Description: Monitor and assess patient's nutrition/hydration status for malnutrition  Collaborate with interdisciplinary team and initiate plan and interventions as ordered  Monitor patient's weight and dietary intake as ordered or per policy  Utilize nutrition screening tool and intervene as necessary  Determine patient's food preferences and provide high-protein, high-caloric foods as appropriate       INTERVENTIONS:  - Monitor oral intake, urinary output, labs, and treatment plans  - Assess nutrition and hydration status and recommend course of action  - Evaluate amount of meals eaten  - Assist patient with eating if necessary   - Allow adequate time for meals  - Recommend/ encourage appropriate diets, oral nutritional supplements, and vitamin/mineral supplements  - Order, calculate, and assess calorie counts as needed  - Recommend, monitor, and adjust tube feedings and TPN/PPN based on assessed needs  - Assess need for intravenous fluids  - Provide specific nutrition/hydration education as appropriate  - Include patient/family/caregiver in decisions related to nutrition  Outcome: Progressing     Problem: Alteration in Thoughts and Perception  Goal: Treatment Goal: Gain control of psychotic behaviors/thinking, reduce/eliminate presenting symptoms and demonstrate improved reality functioning upon discharge  Outcome: Progressing  Goal: Verbalize thoughts and feelings  Description: Interventions:  - Promote a nonjudgmental and trusting relationship with the patient through active listening and therapeutic communication  - Assess patient's level of functioning, behavior and potential for risk  - Engage patient in 1 on 1 interactions  - Encourage patient to express fears, feelings, frustrations, and discuss symptoms    - [place] patient to reality, help patient recognize reality-based thinking   - Administer medications as ordered and assess for potential side effects  - Provide the patient education related to the signs and symptoms of the illness and desired effects of prescribed medications  Outcome: Progressing  Goal: Refrain from acting on delusional thinking/internal stimuli  Description: Interventions:  - Monitor patient closely, per order   - Utilize least restrictive measures   - Set reasonable limits, give positive feedback for acceptable   - Administer medications as ordered and monitor of potential side effects  Outcome: Progressing  Goal: Agree to be compliant with medication regime, as prescribed and report medication side effects  Description: Interventions:  - Offer appropriate PRN medication and supervise ingestion; conduct AIMS, as needed   Outcome: Progressing  Goal: Attend and participate in unit activities, including therapeutic, recreational, and educational groups  Description: Interventions:  -Encourage Visitation and family involvement in care  Outcome: Progressing  Goal: Recognize dysfunctional thoughts, communicate reality-based thoughts at the time of discharge  Description: Interventions:  - Provide medication and psycho-education to assist patient in compliance and developing insight into his/her illness   Outcome: Progressing  Goal: Complete daily ADLs, including personal hygiene independently, as able  Description: Interventions:  - Observe, teach, and assist patient with ADLS  - Monitor and promote a balance of rest/activity, with adequate nutrition and elimination   Outcome: Progressing     Problem: Risk for Self Injury/Neglect  Goal: Treatment Goal: Remain safe during length of stay, learn and adopt new coping skills, and be free of self-injurious ideation, impulses and acts at the time of discharge  Outcome: Progressing  Goal: Verbalize thoughts and feelings  Description: Interventions:  - Assess and re-assess patient's lethality and potential for self-injury  - Engage patient in 1:1 interactions, daily, for a minimum of 15 minutes  - Encourage patient to express feelings, fears, frustrations, hopes  - Establish rapport/trust with patient   Outcome: Progressing  Goal: Refrain from harming self  Description: Interventions:  - Monitor patient closely, per order  - Develop a trusting relationship  - Supervise medication ingestion, monitor effects and side effects   Outcome: Progressing  Goal: Attend and participate in unit activities, including therapeutic, recreational, and educational groups  Description: Interventions:  - Provide therapeutic and educational activities daily, encourage attendance and participation, and document same in the medical record  - Obtain collateral information, encourage visitation and family involvement in care   Outcome: Progressing  Goal: Recognize maladaptive responses and adopt new coping mechanisms  Outcome: Progressing  Goal: Complete daily ADLs, including personal hygiene independently, as able  Description: Interventions:  - Observe, teach, and assist patient with ADLS  - Monitor and promote a balance of rest/activity, with adequate nutrition and elimination  Outcome: Progressing     Problem: Depression  Goal: Treatment Goal: Demonstrate behavioral control of depressive symptoms, verbalize feelings of improved mood/affect, and adopt new coping skills prior to discharge  Outcome: Progressing  Goal: Verbalize thoughts and feelings  Description: Interventions:  - Assess and re-assess patient's level of risk   - Engage patient in 1:1 interactions, daily, for a minimum of 15 minutes   - Encourage patient to express feelings, fears, frustrations, hopes   Outcome: Progressing  Goal: Refrain from harming self  Description: Interventions:  - Monitor patient closely, per order   - Supervise medication ingestion, monitor effects and side effects   Outcome: Progressing  Goal: Refrain from isolation  Description: Interventions:  - Develop a trusting relationship   - Encourage socialization   Outcome: Progressing  Goal: Refrain from self-neglect  Outcome: Progressing  Goal: Attend and participate in unit activities, including therapeutic, recreational, and educational groups  Description: Interventions:  - Provide therapeutic and educational activities daily, encourage attendance and participation, and document same in the medical record   Outcome: Progressing  Goal: Complete daily ADLs, including personal hygiene independently, as able  Description: Interventions:  - Observe, teach, and assist patient with ADLS  -  Monitor and promote a balance of rest/activity, with adequate nutrition and elimination   Outcome: Progressing     Problem: Anxiety  Goal: Anxiety is at manageable level  Description: Interventions:  - Assess and monitor patient's anxiety level  - Monitor for signs and symptoms (heart palpitations, chest pain, shortness of breath, headaches, nausea, feeling jumpy, restlessness, irritable, apprehensive)  - Collaborate with interdisciplinary team and initiate plan and interventions as ordered    - Ronald patient to unit/surroundings  - Explain treatment plan  - Encourage participation in care  - Encourage verbalization of concerns/fears  - Identify coping mechanisms  - Assist in developing anxiety-reducing skills  - Administer/offer alternative therapies  - Limit or eliminate stimulants  Outcome: Progressing     Problem: Risk for Violence/Aggression Toward Others  Goal: Treatment Goal: Refrain from acts of violence/aggression during length of stay, and demonstrate improved impulse control at the time of discharge  Outcome: Progressing  Goal: Verbalize thoughts and feelings  Description: Interventions:  - Assess and re-assess patient's level of risk, every waking shift  - Engage patient in 1:1 interactions, daily, for a minimum of 15 minutes   - Allow patient to express feelings and frustrations in a safe and non-threatening manner - Establish rapport/trust with patient   Outcome: Progressing  Goal: Refrain from harming others  Outcome: Progressing  Goal: Refrain from destructive acts on the environment or property  Outcome: Progressing  Goal: Control angry outbursts  Description: Interventions:  - Monitor patient closely, per order  - Ensure early verbal de-escalation  - Monitor prn medication needs  - Set reasonable/therapeutic limits, outline behavioral expectations, and consequences   - Provide a non-threatening milieu, utilizing the least restrictive interventions   Outcome: Progressing  Goal: Attend and participate in unit activities, including therapeutic, recreational, and educational groups  Description: Interventions:  - Provide therapeutic and educational activities daily, encourage attendance and participation, and document same in the medical record   Outcome: Progressing  Goal: Identify appropriate positive anger management techniques  Description: Interventions:  - Offer anger management and coping skills groups   - Staff will provide positive feedback for appropriate anger control  Outcome: Progressing     Problem: Alteration in Orientation  Goal: Treatment Goal: Demonstrate a reduction of confusion and improved orientation to person, place, time and/or situation upon discharge, according to optimum baseline/ability  Outcome: Progressing  Goal: Interact with staff daily  Description: Interventions:  - Assess and re-assess patient's level of orientation  - Engage patient in 1 on 1 interactions, daily, for a minimum of 15 minutes   - Establish rapport/trust with patient   Outcome: Progressing  Goal: Express concerns related to confused thinking related to:  Description: Interventions:  - Encourage patient to express feelings, fears, frustrations, hopes  - Assign consistent caregivers   - [place]/re-orient patient as needed  - Allow comfort items, as appropriate  - Provide visual cues, signs, etc    Outcome: Progressing  Goal: Allow medical examinations, as recommended  Description: Interventions:  - Provide physical/neurological exams and/or referrals, per provider   Outcome: Progressing  Goal: Cooperate with recommended testing/procedures  Description: Interventions:  - Determine need for ancillary testing  - Observe for mental status changes  - Implement falls/precaution protocol   Outcome: Progressing  Goal: Attend and participate in unit activities, including therapeutic, recreational, and educational groups  Description: Interventions:  - Provide therapeutic and educational activities daily, encourage attendance and participation, and document same in the medical record   - Provide appropriate opportunities for reminiscence   - Provide a consistent daily routine   - Encourage family contact/visitation   Outcome: Progressing  Goal: Complete daily ADLs, including personal hygiene independently, as able  Description: Interventions:  - Observe, teach, and assist patient with ADLS  Outcome: Progressing     Problem: Individualized Interventions  Goal: Patient will verbalize appropriate use of telephone within 5 days  Description: Interventions:  - Treatment team to determine use of supervised phone privileges   Outcome: Progressing  Goal: Patient will verbalize need for hospitalization and will no longer attempt elopement within 5 days  Description: Interventions:  - Ongoing education to help patient understand need for hospitalization  Outcome: Progressing  Goal: Patient will recognize inappropriate behaviors and develop alternative behaviors within 5 days  Description: Interventions:  - Patient in collaboration with Treatment Team will develop a behavior management plan to help identify effective coping skills to deal with stressors  Outcome: Progressing

## 2021-06-02 NOTE — PROGRESS NOTES
06/02/21 1002   Team Meeting   Meeting Type Daily Rounds   Initial Conference Date 06/02/21   Patient/Family Present   Patient Present No   Patient's Family Present No       Daily Rounds Documentation  Team Members Participating  Dr Carrie Barrera, MD Ebony Aleman, POORNIMAN  Samantha De La Rosa, LSW  Amarilis Farooq, LSW  Maria A Amaya, ANNALISE    Case reviewed  C/O right ear pain and possibly a scab from itching  Delusional  Minimal outbursts  Had Zyprexa PRN  2/5 groups

## 2021-06-02 NOTE — PROGRESS NOTES
[facility]  Progress Note Burgess Gomez 1970, 49 y o  female MRN: [id number]  [facility] Encounter: [id number]  Primary Care Provider: Sandee Anthony DO   Date and time admitted to hospital: 2021  2:35 PM    Right foot pain  Assessment & Plan  · Patient complaining of right foot pain  · Worse upon waking and with walking, better with rest  · Reports a right foot injury from falling down stairs over twenty years ago, however no treatment/imaging obtained  · Likely plantar faciitis  · Patient education on stretching plantar fascia upon waking and several time throughout the day  · Can use ice/tylenol for pain    Nurse Coordination of Care Discussion: No    Discussions with Specialists or Other Care Team Provider: No    Education and Discussions with Family / Patient: answered patients questions to the best of my ability    Time Spent for Care: 20 minutes  More than 50% of total time spent on counseling and coordination of care as described above  Current Length of Stay: 56 day(s)    Code Status: Level 1 - Full Code      Subjective:   Patient complaining of right foot pain  Pain is a burning/stretching sensation on the plantar surface of the foot  Worse in the morning upon waking and with walking  Does not feel that tylenol is helping with the pain  Denies any recent injury  Objective:     Vitals:   Temp (24hrs), Av 1 °F (36 7 °C), Min:97 4 °F (36 3 °C), Max:98 6 °F (37 °C)    Temp:  [97 4 °F (36 3 °C)-98 6 °F (37 °C)] 97 4 °F (36 3 °C)  HR:  [] 99  Resp:  [16-18] 18  BP: (106-122)/(66-76) 122/72  Body mass index is 25 24 kg/m²  Additional Data:     Labs:                            * I Have Reviewed All Lab Data Listed Above    * Additional Pertinent Lab Tests Reviewed: No New Labs Available For Today    Imaging:    Imaging Reports Reviewed Today Include: No imaging reviewed today      Last 24 Hours Medication List:   Current Facility-Administered Medications   Medication Dose Route Frequency Provider Last Rate    acetaminophen  650 mg Oral Q6H PRN Mortimer Bridges Lodics, CRNP      acetaminophen  650 mg Oral Q4H PRN Mortimer Bridges Lodics, CRNP      acetaminophen  975 mg Oral Q6H PRN Mortimer Bridges Lodics, CRNP      al mag oxide-diphenhydramine-lidocaine viscous  10 mL Swish & Swallow Q4H PRN Dale Collar, CRNP      albuterol  2 puff Inhalation Q6H PRN Isis Lama, CRNP      aluminum-magnesium hydroxide-simethicone  15 mL Oral Q4H PRN Jasmynsilver Deng, CRNP      benztropine  1 mg Intramuscular Q4H PRN Max 6/day Isis Lama, CRNP      benztropine  1 mg Oral Q4H PRN Max 6/day Isis Lama, CRNP      benztropine  1 mg Oral BID Naman Burciaga MD      calcium carbonate  500 mg Oral TID PRN Mortimer Bridges Lodics, CRNP      diphenhydrAMINE  25 mg Oral HS Isis Lama, CRNP      fenofibrate  145 mg Oral Daily Isis Lama, CRNP      gabapentin  600 mg Oral 4x Daily Isis Lama, CRNP      haloperidol  1 mg Oral 4x Daily Xavier Hannah MD      haloperidol  5 mg Oral Q6H PRN Isis Lama, CRNP      hydrOXYzine HCL  25 mg Oral Q6H PRN Max 4/day Isis Lama, CRNP      hydrOXYzine HCL  50 mg Oral Q6H PRN Max 4/day Isis Lama, CRNP      lidocaine  1 patch Topical Daily Isis Lama, CRNP      LORazepam  1 mg Intramuscular Q6H PRN Xaiver Hannah MD      LORazepam  0 5 mg Oral 4x Daily Xavier Hannah MD      magnesium hydroxide  30 mL Oral Daily PRN Mortimer Bridges Lodics, CRNP      methocarbamol  500 mg Oral Q8H PRN Xavier Hannah MD      nicotine  1 patch Transdermal Daily Linwood Florez, CRNP      OLANZapine  2 5 mg Oral Q8H PRN Linwood Florez, CRNP      OLANZapine  5 mg Oral Q3H PRN Linwood Florez, CRNP      OLANZapine  7 5 mg Oral Q3H PRN Linwood Florez, CRNP      OLANZapine  10 mg Intramuscular Q3H PRN Linwood Florez, CRNP      OLANZapine  5 mg Intramuscular Q3H PRN Dixon Hernandez JARED Escalante      OLANZapine  10 mg Oral HS Ruchi Tolentino MD      OLANZapine  15 mg Oral Daily Ruchi Tolentino MD      paliperidone palmitate ER  234 mg Intramuscular Q30 Days Xavier Hannah MD      pantoprazole  40 mg Oral Early Morning Dale Hector, JARED      Pramox-PE-Glycerin-Petrolatum  1 application Rectal 4x Daily PRN Mortimer Bridges Lodics, JARED      prazosin  1 mg Oral HS JARED Sampson      psyllium  1 packet Oral Daily Jasmyn DARWIN Leeat, JARED      sertraline  50 mg Oral Daily Xavier Hannah MD          Today, Patient Was Seen By: Carroll Fu PA-C          Physical Exam:     Physical Exam  Musculoskeletal:      Right foot: Normal range of motion  No deformity  Feet:    Feet:      Right foot:      Protective Sensation: 1 site tested  1 site sensed       Skin integrity: Skin integrity normal

## 2021-06-02 NOTE — NURSING NOTE
Teresa Steve has been visible intermittently  Patient irritable and labile this shift  Patient had 1  episodes of yelling in room stating "stop doing this to me you all are a bunch of assholes, I can't take it absolutely disgusting " Writer approached patient and asked her what was going on patient replied "nothing you guys must all be dumb and blind " Writer attempted to get patient to use her coping skills to which she sarcastically replied "what you want me to go tanning too " Patient left to deescalte on her own  Shortly after patient came to nurses station and asked writer to please not write her up or put a bad note in stating "see I stopped yelling after you spoke to me could you please put a good note in for me so I can go home " Writer took this opportunity to educate patient on appropriate behaviors and review coping skills  Patient was apologetic  Patient denied anxiety and depression  Medication and meal compliant   Consumed 100% of dinner  Patient attended wrap up  group this shift  Continue to monitor

## 2021-06-02 NOTE — PROGRESS NOTES
Psychiatry Progress Note [facility] 49 y o  female MRN: [id number]  Unit/Bed#: [facility] 242-89 Encounter: [id number]  Code Status: Level 1 - Full Code    PCP: Lefty Brich DO    Date of Admission:  4/7/2021 1435   Date of Service:  06/02/21    Patient Active Problem List   Diagnosis    Schizoaffective disorder, bipolar type ([facility] )    Uncomplicated alcohol dependence ([facility] )    Suicidal behavior    LYNN (generalized anxiety disorder)    Slow transit constipation    Deficiency of vitamin B    Degenerative disc disease, lumbar    Post-traumatic stress disorder, chronic    Lower extremity weakness    Generalized abdominal pain    Non-intractable vomiting    Medical clearance for psychiatric admission    Carbuncle    Alcohol dependence with unspecified alcohol-induced disorder ([facility] )    Mild intermittent asthma without complication    Tobacco abuse    Ear problem, bilateral    Gastroesophageal reflux disease     Review of systems:      Unremarkable  Diagnosis:           Schizoaffective bipolar, alcohol use disorder in early remission  ,Assessment   Overall Status:        Get home 1 minor outburst but was redirectable otherwise friendly and pleasant, did need prn zyprexa for yelling yetserday    Certification Statement: The patient will continue to require additional inpatient hospital stay due to ongoing mood swings paranoia   Acceptance by patient:  accepting    Hopefulness in recovery:  Living in a group home   Understanding of medications: has good understanding   Involved in reintegration process:  Adjusting to the unit   Trusting in relationship with psychiatrist:  trusting   Justification for dual anti-psychotics:  Not applicable   Side effects from treatment: none    Medication changes    none today  Medication Education;  Risks and S/E and precautions of all meds given to patient and she did verbalize an understanding   Non-pharmacological treatments   Continue with individual, group, milieu and occupational therapy using recovery principles and psycho-education about accepting illness and the need for treatment  Safety   Safety and communication plan established to target dynamic risk factors discussed above  Discharge Plan     most likely to a group home with the act team again     Interval Progress    only 1 episode of minor yelling yesterday and was easily redirectable  Attending groups friendly and pleasant and is hoping to be referred to a group home in [facility] area  He repeat EKG showed decrease in QTC from 490 to 478 all since Haldol was lowered  Eating well and sleeping well and tolerating medications without any side effects  Interacting well with peers verbalizing no overt delusions even though she wished still questions if the staff is still hypnotizing her     Sleep:             good   appetite:          good   compliance with medications :     good   Side  Effects:                   None reported today   significant events:              Minor yelling which has markedly decreased   group attendance:             Attending almost all groups, 2/5 groups    Mental Status Exam  Appearance: age appropriate, casually dressed, looks older than stated age, underweight     Pleasant friendly when approached  Behavior: normal, pleasant, cooperative, mildly anxious,  more friendly today and not confrontational  Offering no complaints   Speech: fluent, clear, coherent, increased rate, hypertalkative,   Circumstantial pressured    Mood: depressed, anxious, euphoric  Affect: labile, reactive, slightly brighter  Thought Process: normal abstract reasoning, less prominent, tends to be pressured and circumstantial and preoccupied perseverating on discharge    Thought Content: some paranoia, ideas of reference, but does appear as if paranoid  No current suicidal homicidal thoughts intent or plans verbalized    No phobias obsessions compulsions or distorted body perceptions elicited  Perceptual Disturbances: no auditory hallucinations, no visual hallucinations, denies auditory hallucinations when asked, does not appear responding to internal stimuli, no voices reported today       Hx Risk Factors: chronic mood disorder, chronic psychotic symptoms, alcohol use, limted insight  Sensorium:           Oriented to 3 spheres and to situation  Cognition: recent and remote memory grossly intact  Consciousness: alert and awake  Attention: attention span and concentration are age appropriate  Intellect: appears to be of average intelligence  Insight: improving  Judgement: improving  Motor Activity: no abnormal movements     Vitals  Temp:  [97 2 °F (36 2 °C)-98 4 °F (36 9 °C)] 98 4 °F (36 9 °C)  HR:  [73-76] 76  Resp:  [16-18] 16  BP: (106-115)/(63-79) 106/66  No intake or output data in the 24 hours ending 06/02/21 0506    Lab Results:  [facility] Admission Reviewed     Current Facility-Administered Medications   Medication Dose Route Frequency Provider Last Rate    acetaminophen  650 mg Oral Q6H PRN Anna Lama, CRNP      acetaminophen  650 mg Oral Q4H PRN Anna Lama, CRNP      acetaminophen  975 mg Oral Q6H PRN Isis Lama, CRNP      al mag oxide-diphenhydramine-lidocaine viscous  10 mL Swish & Swallow Q4H PRN Kathy Engel, CRNP      albuterol  2 puff Inhalation Q6H PRN Isis Lama, CRNP      aluminum-magnesium hydroxide-simethicone  15 mL Oral Q4H PRN Jasmyn Deng, CRNP      benztropine  1 mg Intramuscular Q4H PRN Max 6/day Isis Porterics, CRNP      benztropine  1 mg Oral Q4H PRN Max 6/day Isis Lama, CRNP      benztropine  1 mg Oral BID Nabil Hopson MD      calcium carbonate  500 mg Oral TID PRN Anna Lama, CRNP      diphenhydrAMINE  25 mg Oral HS Isis Lama, CRNP      fenofibrate  145 mg Oral Daily Isis Lama, CRNP      gabapentin  600 mg Oral 4x Daily Isis Lama, CRNP      haloperidol  1 mg Oral 4x Daily Lorna Roe MD      haloperidol  5 mg Oral Q6H PRN David Cancel Lodics, CRNP      hydrOXYzine HCL  25 mg Oral Q6H PRN Max 4/day Isisreebka Porterics, CRNP      hydrOXYzine HCL  50 mg Oral Q6H PRN Max 4/day Isis Lama, CRNP      lidocaine  1 patch Topical Daily Isis Lama, CRNP      LORazepam  1 mg Intramuscular Q6H PRN Lorna Roe MD      LORazepam  0 5 mg Oral 4x Daily Lorna Roe MD      magnesium hydroxide  30 mL Oral Daily PRN David Cancel Lodics, CRKALLIE      methocarbamol  500 mg Oral Q8H PRN Lorna Roe MD      nicotine  1 patch Transdermal Daily Wing Fields, CRNP      OLANZapine  2 5 mg Oral Q8H PRN Wing Fields, CRNP      OLANZapine  5 mg Oral Q3H PRN Wing Fields, CRNP      OLANZapine  7 5 mg Oral Q3H PRN Wing Fields, CRNP      OLANZapine  10 mg Intramuscular Q3H PRN Wing Fields, CRNP      OLANZapine  5 mg Intramuscular Q3H PRN Wing Fields, CRNP      OLANZapine  10 mg Oral HS Slime Mcfarland MD      OLANZapine  15 mg Oral Daily Slime Mcfarland MD      paliperidone palmitate ER  234 mg Intramuscular Q30 Days Lorna Roe MD      pantoprazole  40 mg Oral Early Morning Biju Goncalves, JARED      Pramox-PE-Glycerin-Petrolatum  1 application Rectal 4x Daily PRN Barren Cancel Germanics, CRNP      prazosin  1 mg Oral HS Wing Fields, RENATANP      psyllium  1 packet Oral Daily Jasmyn Deng, CRNP      sertraline  50 mg Oral Daily Lorna Roe MD         Counseling / Coordination of Care: Total floor / unit time spent today 15 minutes  Greater than 50% of total time was spent with the patient and / or family counseling and / or somewhat receptive to supportive listening and teaching positive coping skills to deal with symptom mangement       Patient's Rights, confidentiality and exceptions to confidentiality, use of automated medical record, [facility] staff access to medical record, and consent to treatment reviewed  This note was  not shared with the patient because it might further aggravate her psychiatric condition  This note has been dictated

## 2021-06-02 NOTE — NURSING NOTE
Pt visible at times on unit  Denies psych symptoms at this time  In behavior control at this time  Medication and meal compliant  Denies any unmet needs or complaints at this time  Will monitor

## 2021-06-02 NOTE — ASSESSMENT & PLAN NOTE
· Patient complaining of right foot pain  · Worse upon waking and with walking, better with rest  · Reports a right foot injury from falling down stairs over twenty years ago, however no treatment/imaging obtained  · Likely plantar faciitis  · Patient education on stretching plantar fascia upon waking and several time throughout the day  · Can use ice/tylenol for pain

## 2021-06-02 NOTE — NURSING NOTE
Received pt in bed at change of shift with eyes closed; chest movement noted  Continues the same thus this far as per q 7 min room checks  Will continue to monitor  If you are a smoker, it is important for your health to stop smoking. Please be aware that second hand smoke is also harmful.

## 2021-06-03 PROCEDURE — 99223 1ST HOSP IP/OBS HIGH 75: CPT | Performed by: NURSE PRACTITIONER

## 2021-06-03 RX ADMIN — HALOPERIDOL 1 MG: 1 TABLET ORAL at 17:16

## 2021-06-03 RX ADMIN — LORAZEPAM 0.5 MG: 0.5 TABLET ORAL at 11:52

## 2021-06-03 RX ADMIN — PANTOPRAZOLE SODIUM 40 MG: 40 TABLET, DELAYED RELEASE ORAL at 05:59

## 2021-06-03 RX ADMIN — METHOCARBAMOL TABLETS 500 MG: 500 TABLET, COATED ORAL at 09:45

## 2021-06-03 RX ADMIN — GABAPENTIN 600 MG: 300 CAPSULE ORAL at 08:37

## 2021-06-03 RX ADMIN — HALOPERIDOL 1 MG: 1 TABLET ORAL at 21:28

## 2021-06-03 RX ADMIN — BENZTROPINE MESYLATE 1 MG: 1 TABLET ORAL at 17:16

## 2021-06-03 RX ADMIN — NICOTINE 1 PATCH: 21 PATCH, EXTENDED RELEASE TRANSDERMAL at 08:40

## 2021-06-03 RX ADMIN — BENZTROPINE MESYLATE 1 MG: 1 TABLET ORAL at 08:37

## 2021-06-03 RX ADMIN — GABAPENTIN 600 MG: 300 CAPSULE ORAL at 21:27

## 2021-06-03 RX ADMIN — HALOPERIDOL 1 MG: 1 TABLET ORAL at 08:37

## 2021-06-03 RX ADMIN — OLANZAPINE 10 MG: 10 TABLET, FILM COATED ORAL at 21:28

## 2021-06-03 RX ADMIN — LORAZEPAM 0.5 MG: 0.5 TABLET ORAL at 21:27

## 2021-06-03 RX ADMIN — SERTRALINE HYDROCHLORIDE 50 MG: 50 TABLET ORAL at 08:37

## 2021-06-03 RX ADMIN — LORAZEPAM 0.5 MG: 0.5 TABLET ORAL at 08:36

## 2021-06-03 RX ADMIN — FENOFIBRATE 145 MG: 145 TABLET ORAL at 09:00

## 2021-06-03 RX ADMIN — LIDOCAINE 1 PATCH: 50 PATCH CUTANEOUS at 08:38

## 2021-06-03 RX ADMIN — LORAZEPAM 0.5 MG: 0.5 TABLET ORAL at 17:16

## 2021-06-03 RX ADMIN — GABAPENTIN 600 MG: 300 CAPSULE ORAL at 11:52

## 2021-06-03 RX ADMIN — GABAPENTIN 600 MG: 300 CAPSULE ORAL at 17:16

## 2021-06-03 RX ADMIN — PHENYTOIN 1 MG: 125 SUSPENSION ORAL at 21:28

## 2021-06-03 RX ADMIN — DIPHENHYDRAMINE HCL 25 MG: 25 TABLET ORAL at 21:27

## 2021-06-03 RX ADMIN — PSYLLIUM HUSK 1 PACKET: 3.4 POWDER ORAL at 08:37

## 2021-06-03 RX ADMIN — OLANZAPINE 15 MG: 5 TABLET, FILM COATED ORAL at 08:36

## 2021-06-03 RX ADMIN — HALOPERIDOL 1 MG: 1 TABLET ORAL at 11:52

## 2021-06-03 NOTE — PROGRESS NOTES
06/03/21 1242   Team Meeting   Meeting Type Daily Rounds   Initial Conference Date 06/03/21   Patient/Family Present   Patient Present No   Patient's Family Present No       Daily Rounds Documentation  Team Members Participating  MD Amena Fontanez, NEYDA Nolen, DECLANW  Anna Maher, DECLANW  Biju Hebert, ANNALISE    Case reviewed  Yelling in her room, told RN not to chart the yelling  C/O foot pain  Slept most of day yesterday, less visible  No groups

## 2021-06-03 NOTE — NURSING NOTE
Alert, cooperative and visible intermittently  No SI or HI noted  Denies depression, anxiety and pain  Attended community meeting and rec meeting   Consumed 100% of breakfast and pt on a pass during lunch time  Took all medication without prompting  Maintained on safe precautions without incident   Will continue to monitor progress and recovery program

## 2021-06-03 NOTE — PROGRESS NOTES
Progress Note - Behavioral Health   Nori Fuller 49 y o  female MRN: [id number]  [facility] Encounter: [id number]    Assessment/Plan   Principal Problem:    Schizoaffective disorder, bipolar type ([address] )  Active Problems:    Post-traumatic stress disorder, chronic    Medical clearance for psychiatric admission    Mild intermittent asthma without complication    Ear problem, bilateral    Gastroesophageal reflux disease    Right foot pain      Subjective:Patient was seen today for continuation of care, records reviewed and  patient was discussed with the morning case review team   Pascual Lange was seen for a psych follow-up today  Upon encounter she was seen in her room where she was laying down in bed  As per nursing report she remains fixated on foot pain  During our encounter she described her mood as "feeling happier since she has been changed from Lexapro to Zoloft"  She feels she has been on the right medication regimen  She reported reduction in depression  She reported sleeping well and feels that she has been doing very good  She stated feeling ready to go home soon  She denies endorsing auditory visual hallucinations; however she then stated that she still hears people talking through the vents  She did share she feels embarrassed and is trying not to scream   She reported attendance to groups  She denies endorsing any suicidal or homicidal ideation  Does not seem to be experiencing any manic or psychotic symptoms during our encounter  Remains medication compliance  Denies any side effects from medications      Psychiatric Review of Systems:    Sleep: normal  Appetite: normal  Medication side effects: No   ROS: no complaints, denies any headache, shortness of breath, chest pain, restlessness, sore throat or tiredness, all other systems are negative    Vitals:  Vitals:    06/03/21 0700   BP: 98/63   Pulse: 99   Resp: 18   Temp: 97 5 °F (36 4 °C)   SpO2:        Mental Status Evaluation:    Appearance: casually dressed, dressed appropriately, adequate grooming   Behavior:  pleasant, cooperative, calm, good eye contact   Speech:  normal rate and volume, fluent, clear   Mood:  improved, no longer labile, not irritable   Affect:  appropriate   Thought Process:  goal directed, linear   Associations: concrete associations   Thought Content:  negative thoughts, intrusive thoughts, ruminating thoughts   Perceptual Disturbances: no visual hallucinations, auditory hallucinations of "People talking through the vents"   Risk Potential: Suicidal ideation - None  Homicidal ideation - None  Potential for aggression - No   Sensorium:  oriented to person, place, time/date and situation   Memory:  recent and remote memory grossly intact   Consciousness:  alert and awake   Attention: attention span and concentration appear shorter than expected for age   Insight:  improving   Judgment: fair and improving   Gait/Station: normal gait/station, normal balance   Motor Activity: no abnormal movements     Laboratory results:    I have personally reviewed all pertinent laboratory/tests results    Most Recent Labs:   Lab Results   Component Value Date    WBC 6 80 04/09/2021    RBC 4 32 04/09/2021    HGB 13 0 04/09/2021    HCT 39 5 04/09/2021     04/09/2021    RDW 14 3 04/09/2021    NEUTROABS 7 10 (H) 03/16/2021    SODIUM 139 05/03/2021    K 4 4 05/03/2021     05/03/2021    CO2 30 05/03/2021    BUN 14 05/03/2021    CREATININE 0 61 05/03/2021    GLUC 94 05/03/2021    GLUF 94 05/03/2021    CALCIUM 9 8 05/03/2021    AST 25 05/03/2021    ALT 18 05/03/2021    ALKPHOS 56 05/03/2021    TP 7 2 05/03/2021    ALB 4 2 05/03/2021    TBILI 0 25 05/03/2021    CHOLESTEROL 300 (H) 03/31/2021    HDL 57 03/31/2021    TRIG 658 (H) 03/31/2021    LDLCALC  03/31/2021      Comment:      Calculated LDL invalid, triglycerides >400 mg/dl    NONHDLC 147 08/23/2020    AMMONIA 28 10/27/2017    [id number] 3 810 04/09/2021    FREET4 0 89 03/24/2016 PREGUR negative 03/16/2021    PREGSERUM Negative 10/21/2019    HCG <2 00 04/10/2014    RPR Non-Reactive 03/31/2021    HGBA1C 5 0 08/06/2019    EAG 97 08/06/2019       Progress Toward Goals:     Improving  Patient feels getting ready for discharge, displays insight about sometimes uncontrolled screaming       Recommended Treatment:     All current active medications have been reviewed  Encourage group therapy, milieu therapy and occupational therapy  Behavioral Health checks every 7 minutes  Placement pending at group home       Continue current medications:  Current Facility-Administered Medications   Medication Dose Route Frequency Provider Last Rate    acetaminophen  650 mg Oral Q6H PRN Latif Calamity Lodics, CRNP      acetaminophen  650 mg Oral Q4H PRN Salomon Burrowsty Germanics, CRNP      acetaminophen  975 mg Oral Q6H PRN Isis Lama, CRNP      al mag oxide-diphenhydramine-lidocaine viscous  10 mL Swish & Swallow Q4H PRN Orlie Coupe, CRNP      albuterol  2 puff Inhalation Q6H PRN Isis Lama, CRNP      aluminum-magnesium hydroxide-simethicone  15 mL Oral Q4H PRN Jasmyn Deng, CRNP      benztropine  1 mg Intramuscular Q4H PRN Max 6/day Isis Lama, CRNP      benztropine  1 mg Oral Q4H PRN Max 6/day Isis Lama, CRNP      benztropine  1 mg Oral BID Lisa Alonzo MD      calcium carbonate  500 mg Oral TID PRN Salomon Lama, CRNP      diphenhydrAMINE  25 mg Oral HS Isis Lama, CRNP      fenofibrate  145 mg Oral Daily Isis Lama, CRNP      gabapentin  600 mg Oral 4x Daily Isis Lama, CRNP      haloperidol  1 mg Oral 4x Daily Eze Plata MD      haloperidol  5 mg Oral Q6H PRN Isis Lama, CRNP      hydrOXYzine HCL  25 mg Oral Q6H PRN Max 4/day Isis Lama, CRNP      hydrOXYzine HCL  50 mg Oral Q6H PRN Max 4/day Isis Lama, CRNP      lidocaine  1 patch Topical Daily Isis Lama, CRNP      LORazepam  1 mg Intramuscular Q6H PRN Lucille Maher MD      LORazepam  0 5 mg Oral 4x Daily Lucille Maher MD      magnesium hydroxide  30 mL Oral Daily PRN Minh Lama, JARED      methocarbamol  500 mg Oral Q8H PRN Lucille Maher MD      nicotine  1 patch Transdermal Daily Harl Dopp, CRNP      OLANZapine  2 5 mg Oral Q8H PRN Harl Dopp, CRNP      OLANZapine  5 mg Oral Q3H PRN Harl Dopp, CRNP      OLANZapine  7 5 mg Oral Q3H PRN Harl Dopp, CRNP      OLANZapine  10 mg Intramuscular Q3H PRN Harl Dopp, CRNP      OLANZapine  5 mg Intramuscular Q3H PRN Harl Dopp, CRNP      OLANZapine  10 mg Oral HS Jeremiah Davenport MD      OLANZapine  15 mg Oral Daily Jeremiah Davenport MD      paliperidone palmitate ER  234 mg Intramuscular Q30 Days Lucille Maher MD      pantoprazole  40 mg Oral Early Morning April Hays, CRNP      Pramox-PE-Glycerin-Petrolatum  1 application Rectal 4x Daily PRN Minh Lama, CRNP      prazosin  1 mg Oral HS Harl Dopp, CRNP      psyllium  1 packet Oral Daily Jasmyn L Sowmya, CRNP      sertraline  50 mg Oral Daily Lucille Maher MD         Risks / Benefits of Treatment:     Risks, benefits, and possible side effects of medications explained to patient  Patient has limited understanding of risks and benefits of treatment at this time, but agrees to take medications as prescribed  Counseling / Coordination of Care:     Patient's progress reviewed with nursing staff  Medications, treatment progress and treatment plan reviewed with patient  Supportive counseling provided to the patient            Chelsea Hatch

## 2021-06-03 NOTE — SOCIAL WORK
PC received from [facility] [place]- pt's eye glasses are ready for  at $115  Nurse informed to pass this information along to patient, who will be leaving for her dental appointment and then is able to stop off at [facility] to retreive her glasses  Staff aware patient is able to do this prior to her return to unit and will be accompanying her the entire time

## 2021-06-03 NOTE — NURSING NOTE
Pt denies SI/HI and AVH  Pt is very focused on foot pain   Pt stated  " when can I get my Flexeril l as I get different answers from everyone "  Education given  that she is not receiving Flexeril but is receiving Robaxin and it is every 8 hrs as needed  Pt was receptive to eduction and will be requesting Robaxin prior to bed time  Pt has been visible in the milieu but not very social   Will continue to monitor

## 2021-06-03 NOTE — PLAN OF CARE
Problem: Nutrition/Hydration-ADULT  Goal: Nutrient/Hydration intake appropriate for improving, restoring or maintaining nutritional needs  Description: Monitor and assess patient's nutrition/hydration status for malnutrition  Collaborate with interdisciplinary team and initiate plan and interventions as ordered  Monitor patient's weight and dietary intake as ordered or per policy  Utilize nutrition screening tool and intervene as necessary  Determine patient's food preferences and provide high-protein, high-caloric foods as appropriate       INTERVENTIONS:  - Monitor oral intake, urinary output, labs, and treatment plans  - Assess nutrition and hydration status and recommend course of action  - Evaluate amount of meals eaten  - Assist patient with eating if necessary   - Allow adequate time for meals  - Recommend/ encourage appropriate diets, oral nutritional supplements, and vitamin/mineral supplements  - Order, calculate, and assess calorie counts as needed  - Recommend, monitor, and adjust tube feedings and TPN/PPN based on assessed needs  - Assess need for intravenous fluids  - Provide specific nutrition/hydration education as appropriate  - Include patient/family/caregiver in decisions related to nutrition  Outcome: Not Progressing     Problem: Alteration in Thoughts and Perception  Goal: Treatment Goal: Gain control of psychotic behaviors/thinking, reduce/eliminate presenting symptoms and demonstrate improved reality functioning upon discharge  Outcome: Not Progressing  Goal: Verbalize thoughts and feelings  Description: Interventions:  - Promote a nonjudgmental and trusting relationship with the patient through active listening and therapeutic communication  - Assess patient's level of functioning, behavior and potential for risk  - Engage patient in 1 on 1 interactions  - Encourage patient to express fears, feelings, frustrations, and discuss symptoms    - [place] patient to reality, help patient recognize reality-based thinking   - Administer medications as ordered and assess for potential side effects  - Provide the patient education related to the signs and symptoms of the illness and desired effects of prescribed medications  Outcome: Not Progressing  Goal: Refrain from acting on delusional thinking/internal stimuli  Description: Interventions:  - Monitor patient closely, per order   - Utilize least restrictive measures   - Set reasonable limits, give positive feedback for acceptable   - Administer medications as ordered and monitor of potential side effects  Outcome: Not Progressing  Goal: Agree to be compliant with medication regime, as prescribed and report medication side effects  Description: Interventions:  - Offer appropriate PRN medication and supervise ingestion; conduct AIMS, as needed   Outcome: Not Progressing  Goal: Attend and participate in unit activities, including therapeutic, recreational, and educational groups  Description: Interventions:  -Encourage Visitation and family involvement in care  Outcome: Not Progressing  Goal: Recognize dysfunctional thoughts, communicate reality-based thoughts at the time of discharge  Description: Interventions:  - Provide medication and psycho-education to assist patient in compliance and developing insight into his/her illness   Outcome: Not Progressing  Goal: Complete daily ADLs, including personal hygiene independently, as able  Description: Interventions:  - Observe, teach, and assist patient with ADLS  - Monitor and promote a balance of rest/activity, with adequate nutrition and elimination   Outcome: Not Progressing     Problem: Ineffective Coping  Goal: Cooperates with admission process  Description: Interventions:   - Complete admission process  Outcome: Not Progressing  Goal: Identifies ineffective coping skills  Outcome: Not Progressing  Goal: Identifies healthy coping skills  Outcome: Not Progressing  Goal: Demonstrates healthy coping skills  Outcome: Not Progressing  Goal: Participates in unit activities  Description: Interventions:  - Provide therapeutic environment   - Provide required programming   - Redirect inappropriate behaviors   Outcome: Not Progressing  Goal: Patient/Family participate in treatment and DC plans  Description: Interventions:  - Provide therapeutic environment  Outcome: Not Progressing  Goal: Patient/Family verbalizes awareness of resources  Outcome: Not Progressing  Goal: Understands least restrictive measures  Description: Interventions:  - Utilize least restrictive behavior  Outcome: Not Progressing  Goal: Free from restraint events  Description: - Utilize least restrictive measures   - Provide behavioral interventions   - Redirect inappropriate behaviors   Outcome: Not Progressing     Problem: Risk for Self Injury/Neglect  Goal: Treatment Goal: Remain safe during length of stay, learn and adopt new coping skills, and be free of self-injurious ideation, impulses and acts at the time of discharge  Outcome: Not Progressing  Goal: Verbalize thoughts and feelings  Description: Interventions:  - Assess and re-assess patient's lethality and potential for self-injury  - Engage patient in 1:1 interactions, daily, for a minimum of 15 minutes  - Encourage patient to express feelings, fears, frustrations, hopes  - Establish rapport/trust with patient   Outcome: Not Progressing  Goal: Refrain from harming self  Description: Interventions:  - Monitor patient closely, per order  - Develop a trusting relationship  - Supervise medication ingestion, monitor effects and side effects   Outcome: Not Progressing  Goal: Attend and participate in unit activities, including therapeutic, recreational, and educational groups  Description: Interventions:  - Provide therapeutic and educational activities daily, encourage attendance and participation, and document same in the medical record  - Obtain collateral information, encourage visitation and family involvement in care   Outcome: Not Progressing  Goal: Recognize maladaptive responses and adopt new coping mechanisms  Outcome: Not Progressing  Goal: Complete daily ADLs, including personal hygiene independently, as able  Description: Interventions:  - Observe, teach, and assist patient with ADLS  - Monitor and promote a balance of rest/activity, with adequate nutrition and elimination  Outcome: Not Progressing     Problem: Depression  Goal: Treatment Goal: Demonstrate behavioral control of depressive symptoms, verbalize feelings of improved mood/affect, and adopt new coping skills prior to discharge  Outcome: Not Progressing  Goal: Verbalize thoughts and feelings  Description: Interventions:  - Assess and re-assess patient's level of risk   - Engage patient in 1:1 interactions, daily, for a minimum of 15 minutes   - Encourage patient to express feelings, fears, frustrations, hopes   Outcome: Not Progressing  Goal: Refrain from harming self  Description: Interventions:  - Monitor patient closely, per order   - Supervise medication ingestion, monitor effects and side effects   Outcome: Not Progressing  Goal: Refrain from isolation  Description: Interventions:  - Develop a trusting relationship   - Encourage socialization   Outcome: Not Progressing  Goal: Refrain from self-neglect  Outcome: Not Progressing  Goal: Attend and participate in unit activities, including therapeutic, recreational, and educational groups  Description: Interventions:  - Provide therapeutic and educational activities daily, encourage attendance and participation, and document same in the medical record   Outcome: Not Progressing  Goal: Complete daily ADLs, including personal hygiene independently, as able  Description: Interventions:  - Observe, teach, and assist patient with ADLS  -  Monitor and promote a balance of rest/activity, with adequate nutrition and elimination   Outcome: Not Progressing     Problem: Anxiety  Goal: Anxiety is at manageable level  Description: Interventions:  - Assess and monitor patient's anxiety level  - Monitor for signs and symptoms (heart palpitations, chest pain, shortness of breath, headaches, nausea, feeling jumpy, restlessness, irritable, apprehensive)  - Collaborate with interdisciplinary team and initiate plan and interventions as ordered    - [place] patient to unit/surroundings  - Explain treatment plan  - Encourage participation in care  - Encourage verbalization of concerns/fears  - Identify coping mechanisms  - Assist in developing anxiety-reducing skills  - Administer/offer alternative therapies  - Limit or eliminate stimulants  Outcome: Not Progressing     Problem: Risk for Violence/Aggression Toward Others  Goal: Treatment Goal: Refrain from acts of violence/aggression during length of stay, and demonstrate improved impulse control at the time of discharge  Outcome: Not Progressing  Goal: Verbalize thoughts and feelings  Description: Interventions:  - Assess and re-assess patient's level of risk, every waking shift  - Engage patient in 1:1 interactions, daily, for a minimum of 15 minutes   - Allow patient to express feelings and frustrations in a safe and non-threatening manner   - Establish rapport/trust with patient   Outcome: Not Progressing  Goal: Refrain from harming others  Outcome: Not Progressing  Goal: Refrain from destructive acts on the environment or property  Outcome: Not Progressing  Goal: Control angry outbursts  Description: Interventions:  - Monitor patient closely, per order  - Ensure early verbal de-escalation  - Monitor prn medication needs  - Set reasonable/therapeutic limits, outline behavioral expectations, and consequences   - Provide a non-threatening milieu, utilizing the least restrictive interventions   Outcome: Not Progressing  Goal: Attend and participate in unit activities, including therapeutic, recreational, and educational groups  Description: Interventions:  - Provide therapeutic and educational activities daily, encourage attendance and participation, and document same in the medical record   Outcome: Not Progressing  Goal: Identify appropriate positive anger management techniques  Description: Interventions:  - Offer anger management and coping skills groups   - Staff will provide positive feedback for appropriate anger control  Outcome: Not Progressing     Problem: Alteration in Orientation  Goal: Treatment Goal: Demonstrate a reduction of confusion and improved orientation to person, place, time and/or situation upon discharge, according to optimum baseline/ability  Outcome: Not Progressing  Goal: Interact with staff daily  Description: Interventions:  - Assess and re-assess patient's level of orientation  - Engage patient in 1 on 1 interactions, daily, for a minimum of 15 minutes   - Establish rapport/trust with patient   Outcome: Not Progressing  Goal: Express concerns related to confused thinking related to:  Description: Interventions:  - Encourage patient to express feelings, fears, frustrations, hopes  - Assign consistent caregivers   - [place]/re-orient patient as needed  - Allow comfort items, as appropriate  - Provide visual cues, signs, etc    Outcome: Not Progressing  Goal: Allow medical examinations, as recommended  Description: Interventions:  - Provide physical/neurological exams and/or referrals, per provider   Outcome: Not Progressing  Goal: Cooperate with recommended testing/procedures  Description: Interventions:  - Determine need for ancillary testing  - Observe for mental status changes  - Implement falls/precaution protocol   Outcome: Not Progressing  Goal: Attend and participate in unit activities, including therapeutic, recreational, and educational groups  Description: Interventions:  - Provide therapeutic and educational activities daily, encourage attendance and participation, and document same in the medical record   - Provide appropriate opportunities for reminiscence   - Provide a consistent daily routine   - Encourage family contact/visitation   Outcome: Not Progressing  Goal: Complete daily ADLs, including personal hygiene independently, as able  Description: Interventions:  - Observe, teach, and assist patient with ADLS  Outcome: Not Progressing     Problem: Individualized Interventions  Goal: Patient will verbalize appropriate use of telephone within 5 days  Description: Interventions:  - Treatment team to determine use of supervised phone privileges   Outcome: Not Progressing  Goal: Patient will verbalize need for hospitalization and will no longer attempt elopement within 5 days  Description: Interventions:  - Ongoing education to help patient understand need for hospitalization  Outcome: Not Progressing  Goal: Patient will recognize inappropriate behaviors and develop alternative behaviors within 5 days  Description: Interventions:  - Patient in collaboration with Treatment Team will develop a behavior management plan to help identify effective coping skills to deal with stressors  Outcome: Not Progressing     Problem: DISCHARGE PLANNING - CARE MANAGEMENT  Goal: Discharge to post-acute care or home with appropriate resources  Description: INTERVENTIONS:  - Conduct assessment to determine patient/family and health care team treatment goals, and need for post-acute services based on payer coverage, community resources, and patient preferences, and barriers to discharge  - Address psychosocial, clinical, and financial barriers to discharge as identified in assessment in conjunction with the patient/family and health care team  - Arrange appropriate level of post-acute services according to patients   needs and preference and payer coverage in collaboration with the physician and health care team  - Communicate with and update the patient/family, physician, and health care team regarding progress on the discharge plan  - Arrange appropriate transportation to post-acute venues  Outcome: Not Progressing

## 2021-06-03 NOTE — SOCIAL WORK
Pt approached SW this morning while SW was in middle of another conversation  Pt somewhat intrusive, but redirected at the time  She walked away yelling and then went into the conference room, briefly yelling out and making slanderous comments about SW not helping her enough  Pt then approached nurse station demanding for medications  Very irritable but appeared to calm down afterwards  Pt stated she just wants to confirm her appointment today and  time, which SW provided to her again

## 2021-06-04 PROCEDURE — 99232 SBSQ HOSP IP/OBS MODERATE 35: CPT | Performed by: PSYCHIATRY & NEUROLOGY

## 2021-06-04 RX ADMIN — FENOFIBRATE 145 MG: 145 TABLET ORAL at 08:21

## 2021-06-04 RX ADMIN — GABAPENTIN 600 MG: 300 CAPSULE ORAL at 08:19

## 2021-06-04 RX ADMIN — HALOPERIDOL 1 MG: 1 TABLET ORAL at 12:56

## 2021-06-04 RX ADMIN — BENZTROPINE MESYLATE 1 MG: 1 TABLET ORAL at 08:21

## 2021-06-04 RX ADMIN — LORAZEPAM 0.5 MG: 0.5 TABLET ORAL at 08:21

## 2021-06-04 RX ADMIN — GABAPENTIN 600 MG: 300 CAPSULE ORAL at 12:56

## 2021-06-04 RX ADMIN — LORAZEPAM 0.5 MG: 0.5 TABLET ORAL at 12:56

## 2021-06-04 RX ADMIN — PSYLLIUM HUSK 1 PACKET: 3.4 POWDER ORAL at 08:19

## 2021-06-04 RX ADMIN — LORAZEPAM 0.5 MG: 0.5 TABLET ORAL at 21:09

## 2021-06-04 RX ADMIN — PANTOPRAZOLE SODIUM 40 MG: 40 TABLET, DELAYED RELEASE ORAL at 05:26

## 2021-06-04 RX ADMIN — GABAPENTIN 600 MG: 300 CAPSULE ORAL at 17:23

## 2021-06-04 RX ADMIN — DIPHENHYDRAMINE HCL 25 MG: 25 TABLET ORAL at 21:09

## 2021-06-04 RX ADMIN — HALOPERIDOL 1 MG: 1 TABLET ORAL at 08:21

## 2021-06-04 RX ADMIN — SERTRALINE HYDROCHLORIDE 50 MG: 50 TABLET ORAL at 08:21

## 2021-06-04 RX ADMIN — PHENYTOIN 1 MG: 125 SUSPENSION ORAL at 21:09

## 2021-06-04 RX ADMIN — OLANZAPINE 10 MG: 10 TABLET, FILM COATED ORAL at 21:10

## 2021-06-04 RX ADMIN — METHOCARBAMOL TABLETS 500 MG: 500 TABLET, COATED ORAL at 18:50

## 2021-06-04 RX ADMIN — HALOPERIDOL 1 MG: 1 TABLET ORAL at 21:10

## 2021-06-04 RX ADMIN — OLANZAPINE 15 MG: 5 TABLET, FILM COATED ORAL at 08:20

## 2021-06-04 RX ADMIN — GABAPENTIN 600 MG: 300 CAPSULE ORAL at 21:09

## 2021-06-04 RX ADMIN — HALOPERIDOL 1 MG: 1 TABLET ORAL at 17:23

## 2021-06-04 RX ADMIN — LORAZEPAM 0.5 MG: 0.5 TABLET ORAL at 17:23

## 2021-06-04 RX ADMIN — BENZTROPINE MESYLATE 1 MG: 1 TABLET ORAL at 17:23

## 2021-06-04 NOTE — PLAN OF CARE
Problem: Alteration in Thoughts and Perception  Goal: Verbalize thoughts and feelings  Description: Interventions:  - Promote a nonjudgmental and trusting relationship with the patient through active listening and therapeutic communication  - Assess patient's level of functioning, behavior and potential for risk  - Engage patient in 1 on 1 interactions  - Encourage patient to express fears, feelings, frustrations, and discuss symptoms    - [place] patient to reality, help patient recognize reality-based thinking   - Administer medications as ordered and assess for potential side effects  - Provide the patient education related to the signs and symptoms of the illness and desired effects of prescribed medications  Outcome: Progressing     Problem: Ineffective Coping  Goal: Participates in unit activities  Description: Interventions:  - Provide therapeutic environment   - Provide required programming   - Redirect inappropriate behaviors   Outcome: Progressing  Patient completed Goal Card for the week of 6/7/21    Goals: Attend groups             Go to face to face meetings              Meditate and pray

## 2021-06-04 NOTE — SOCIAL WORK
Summary: Met with patient per her request to assist her in making a few purchases for personal care items / undergarments and clothing since she is limited with what she has since arriving to unit  Pt spent time shopping online and found a variety of items she purchased and was pleased with  SW and patient also spent time discussing her progress in the program, checking in to see how she has been doing with her coping skills, in groups, and overall with managing her yelling out / 'psychotic episodes' she calls them  Overall, patient has been less delusional, calmer and more pleasant in interactions, with occasional outburst that usually subsides quickly  She appears to be gaining some insight and cultivating greater awareness of her triggers and behaviors, as well as intentionally using coping skills to address her distressing symptoms  Pt identified that walking around the unit at a quick pace has helped distract her and diminish some of her anxiety  Pt also identified that coloring and writing in her journal help  She stated she has been keeping up with her positivity journal regularly, and this has helped her mind focus on only good things, good memories, and positive qualities about herself  Pt also less focused on delusional material, and did not mention hypnosis at this time (and has not in past few encounters)  Previously pt was very fixated on this so much so that we could not carry a conversation without the notion of being hypnotized taking over  Pt very receptive to the therapy and open to encouragement with her progress

## 2021-06-04 NOTE — NURSING NOTE
Patient was visible in the milieu but with no interaction with her peers  Denies all s/s at this time  No complaints offered  No behavioral issues noted  Attended evening groups  Had snack  Patient was cooperative and compliant with HS medications  Safety checks ongoing

## 2021-06-04 NOTE — PROGRESS NOTES
Psychiatry Progress Note Paulie Hill [age] y o  female MRN: [id number]  Unit/Bed#: [facility] 518-59 Encounter: [id number]  Code Status: Level 1 - Full Code    PCP: Awilda Greene DO    Date of Admission:  4/7/2021 1435   Date of Service:  06/04/21    Patient Active Problem List   Diagnosis    Schizoaffective disorder, bipolar type ([facility] )    Uncomplicated alcohol dependence ([facility] )    Suicidal behavior    LYNN (generalized anxiety disorder)    Slow transit constipation    Deficiency of vitamin B    Degenerative disc disease, lumbar    Post-traumatic stress disorder, chronic    Lower extremity weakness    Generalized abdominal pain    Non-intractable vomiting    Medical clearance for psychiatric admission    Carbuncle    Alcohol dependence with unspecified alcohol-induced disorder ([facility] )    Mild intermittent asthma without complication    Tobacco abuse    Ear problem, bilateral    Gastroesophageal reflux disease    Right foot pain     Review of systems:       Unremarkable but focused on medication for foot pain and back pain and has been asking for Robaxin as p r n  Did have dental consult and picked up classes  Diagnosis:            Schizoaffective bipolar, alcohol use disorder in early remission  ,Assessment   Overall Status:         Was intrusive and upset with  who could not attend to her readily while talking with another patient   No screaming or yelling reported but still delusional about receiving messages from T threatening that the feds are after her for many years   Certification Statement: The patient will continue to require additional inpatient hospital stay due to ongoing mood swings paranoia   Acceptance by patient:  accepting    Hopefulness in recovery:  Living in a group home   Understanding of medications: has good understanding   Involved in reintegration process:  Adjusting to the unit   Trusting in relationship with psychiatrist: trusting   Justification for dual anti-psychotics:  Not applicable   Side effects from treatment: none    Medication changes    none today  Medication Education; Risks and S/E and precautions of all meds given to patient and she did verbalize an understanding   Non-pharmacological treatments   Continue with individual, group, milieu and occupational therapy using recovery principles and psycho-education about accepting illness and the need for treatment  Safety   Safety and communication plan established to target dynamic risk factors discussed above  Discharge Plan     most likely to a group home with the act team again     Interval Progress     No episodes of yelling in the last 24 hours but loose upset agitated demanding p r n  meds and was threatening to act out because  could not talk to her right away while busy with another patient and elated did she did come down  She did go  For dental consult and picked up her  Glasses  Still delusional about staff hypnotized sing her and receiving messages from TV that the feds are after her and people talking about her    Homework all brighter in affect when approached and has been attending groups     Sleep:              good   appetite:           good   compliance with medications :      good   Side  Effects:                    None reported today   significant events:               No yelling yesterday but still delusional and gets agitated at times  Out of frustration and was intrusive but redirectable   group attendance:          Attending most of the groups 4/6    Mental Status Exam  Appearance: age appropriate, casually dressed, looks older than stated age, underweight  Pleasant friendly when approached  Behavior: normal, pleasant, cooperative, mildly anxious,  more friendly today and not confrontational   Offering no complaints   Speech: fluent, clear, coherent, increased rate, hypertalkative,   Circumstantial pressured    Mood: depressed, anxious, euphoric  Affect: labile, reactive, slightly brighter  Thought Process: normal abstract reasoning, less prominent, tends to be pressured and circumstantial and preoccupied perseverating on discharge    Thought Content: some paranoia, ideas of reference, but does appear as if paranoid  No current suicidal homicidal thoughts intent or plans verbalized  No phobias obsessions compulsions or distorted body perceptions elicited  Also believes T he is sending her messages from movie stars checking her if she is good or bad and threatening to get the feds to go after her  Perceptual Disturbances: no auditory hallucinations, no visual hallucinations, denies auditory hallucinations when asked, does not appear responding to internal stimuli, no voices reported today       Hx Risk Factors: chronic mood disorder, chronic psychotic symptoms, alcohol use, limted insight  Sensorium:            Oriented to 3 spheres  Cognition: recent and remote memory grossly intact  Consciousness: alert and awake  Attention: attention span and concentration are age appropriate  Intellect: appears to be of average intelligence  Insight: improving  Judgement: improving  Motor Activity: no abnormal movements     Vitals  Temp:  [97 1 °F (36 2 °C)-97 5 °F (36 4 °C)] 97 1 °F (36 2 °C)  HR:  [91-99] 92  Resp:  [17-18] 17  BP: ()/(63-88) 124/88  No intake or output data in the 24 hours ending 06/04/21 0550    Lab Results:  [facility] Admission Reviewed     Current Facility-Administered Medications   Medication Dose Route Frequency Provider Last Rate    acetaminophen  650 mg Oral Q6H PRN Dwana Freeze Lodics, CRNP      acetaminophen  650 mg Oral Q4H PRN Christian Freeze Lodics, CRNP      acetaminophen  975 mg Oral Q6H PRN JARED Jones      al mag oxide-diphenhydramine-lidocaine viscous  10 mL Swish & Swallow Q4H PRN Severa Child, CRNP      albuterol  2 puff Inhalation Q6H PRN JARED Vasquez      aluminum-magnesium hydroxide-simethicone  15 mL Oral Q4H PRN Jasmyn Deng, CRNP      benztropine  1 mg Intramuscular Q4H PRN Max 6/day Isis Lama, CRNP      benztropine  1 mg Oral Q4H PRN Max 6/day Isis Lama, CRNP      benztropine  1 mg Oral BID Ashlie Calhoun MD      calcium carbonate  500 mg Oral TID PRN Kamille Lama, CRNP      diphenhydrAMINE  25 mg Oral HS Isis Lama, CRNP      fenofibrate  145 mg Oral Daily Isis Lama, CRNP      gabapentin  600 mg Oral 4x Daily Isis Lama, CRNP      haloperidol  1 mg Oral 4x Daily Brooke Mack MD      haloperidol  5 mg Oral Q6H PRN Isis Lama, CRNP      hydrOXYzine HCL  25 mg Oral Q6H PRN Max 4/day Isis Lama, CRNP      hydrOXYzine HCL  50 mg Oral Q6H PRN Max 4/day Isis Lama, CRNP      lidocaine  1 patch Topical Daily Isis Lama, CRNP      LORazepam  1 mg Intramuscular Q6H PRN Brooke Mack MD      LORazepam  0 5 mg Oral 4x Daily Brooke Mack MD      magnesium hydroxide  30 mL Oral Daily PRN Kamille Lama, JARED      methocarbamol  500 mg Oral Q8H PRN Brooke Mack MD      nicotine  1 patch Transdermal Daily Sudarshan Ny, CRNP      OLANZapine  2 5 mg Oral Q8H PRN Sudarshan Ny, CRNP      OLANZapine  5 mg Oral Q3H PRN Sudarshan Ny, CRNP      OLANZapine  7 5 mg Oral Q3H PRN Sudarshan Ny, CRNP      OLANZapine  10 mg Intramuscular Q3H PRN Sudarshan Ny, CRNP      OLANZapine  5 mg Intramuscular Q3H PRN Sudarshan Ny, CRNP      OLANZapine  10 mg Oral HS Iqra Caal MD      OLANZapine  15 mg Oral Daily Daxa Grier MD      paliperidone palmitate ER  234 mg Intramuscular Q30 Days Brooke Mack MD      pantoprazole  40 mg Oral Early Morning Kellen Olivier, RENATANP      Pramox-PE-Glycerin-Petrolatum  1 application Rectal 4x Daily PRN Kamille Lama, RENATANP      prazosin  1 mg Oral HS Sudarshan Ny, CRNP      psyllium  1 packet Oral Daily Jasmyn GRANADOS JARED Deng      sertraline  50 mg Oral Daily Soledad Del Rio MD         Counseling / Coordination of Care: Total floor / unit time spent today 15 minutes  Greater than 50% of total time was spent with the patient and / or family counseling and / or somewhat receptive to supportive listening and teaching positive coping skills to deal with symptom mangement  Patient's Rights, confidentiality and exceptions to confidentiality, use of automated medical record, [address] [place] [address] staff access to medical record, and consent to treatment reviewed  This note was  not shared with the patient because it might further aggravate her psychiatric condition  This note has been dictated

## 2021-06-04 NOTE — PROGRESS NOTES
06/04/21 1048   Team Meeting   Meeting Type Daily Rounds   Initial Conference Date 06/04/21   Patient/Family Present   Patient Present No   Patient's Family Present No       Daily Rounds Documentation  Team Members Participating  MD Miels Nelson, RN  Yelitza Mcdonald, DECLANW  Kateryna Haile, DECLANW  Surya Atkinson, ANNALISE    Case reviewed  EKG done  Pass went well yesterday, behaviors controlled  Somatic and med seeking at times  4/6 groups

## 2021-06-04 NOTE — NURSING NOTE
[facility] reports having "something" in her R ear and is requesting ear drops  Will notify 7-3 nurse

## 2021-06-04 NOTE — NURSING NOTE
Patient is visible and social with select few peers  She brightens slightly on approach She had an angry outburst; screaming in her bathroom then intimating that staff "wanted her to get fat and made her eat more"  Then she made a phone call and expressed some persecutory paranoid thoughts about people being out to get here "and keep her here"  She c/o back pain "spasms" and received Robaxin prn at 1850  No other episodes of yelling  Behavior in control after 1900

## 2021-06-04 NOTE — NURSING NOTE
Patient is compliant with medication and meals   She is social with her peers and attends most groups   She is not as delusional as she was when she first came in

## 2021-06-05 PROCEDURE — 99232 SBSQ HOSP IP/OBS MODERATE 35: CPT | Performed by: PSYCHIATRY & NEUROLOGY

## 2021-06-05 RX ADMIN — METHOCARBAMOL TABLETS 500 MG: 500 TABLET, COATED ORAL at 10:46

## 2021-06-05 RX ADMIN — NICOTINE 1 PATCH: 21 PATCH, EXTENDED RELEASE TRANSDERMAL at 09:42

## 2021-06-05 RX ADMIN — GABAPENTIN 600 MG: 300 CAPSULE ORAL at 21:05

## 2021-06-05 RX ADMIN — BENZTROPINE MESYLATE 1 MG: 1 TABLET ORAL at 08:13

## 2021-06-05 RX ADMIN — HALOPERIDOL 1 MG: 1 TABLET ORAL at 12:08

## 2021-06-05 RX ADMIN — OLANZAPINE 15 MG: 5 TABLET, FILM COATED ORAL at 08:12

## 2021-06-05 RX ADMIN — HALOPERIDOL 1 MG: 1 TABLET ORAL at 08:12

## 2021-06-05 RX ADMIN — PSYLLIUM HUSK 1 PACKET: 3.4 POWDER ORAL at 08:18

## 2021-06-05 RX ADMIN — OLANZAPINE 10 MG: 10 TABLET, FILM COATED ORAL at 21:05

## 2021-06-05 RX ADMIN — SERTRALINE HYDROCHLORIDE 50 MG: 50 TABLET ORAL at 08:14

## 2021-06-05 RX ADMIN — PHENYTOIN 1 MG: 125 SUSPENSION ORAL at 21:05

## 2021-06-05 RX ADMIN — GABAPENTIN 600 MG: 300 CAPSULE ORAL at 08:13

## 2021-06-05 RX ADMIN — GABAPENTIN 600 MG: 300 CAPSULE ORAL at 17:06

## 2021-06-05 RX ADMIN — HALOPERIDOL 1 MG: 1 TABLET ORAL at 21:05

## 2021-06-05 RX ADMIN — LIDOCAINE 1 PATCH: 50 PATCH CUTANEOUS at 09:42

## 2021-06-05 RX ADMIN — FENOFIBRATE 145 MG: 145 TABLET ORAL at 08:21

## 2021-06-05 RX ADMIN — GABAPENTIN 600 MG: 300 CAPSULE ORAL at 12:08

## 2021-06-05 RX ADMIN — LORAZEPAM 0.5 MG: 0.5 TABLET ORAL at 08:13

## 2021-06-05 RX ADMIN — LORAZEPAM 0.5 MG: 0.5 TABLET ORAL at 17:06

## 2021-06-05 RX ADMIN — LORAZEPAM 0.5 MG: 0.5 TABLET ORAL at 12:08

## 2021-06-05 RX ADMIN — HALOPERIDOL 1 MG: 1 TABLET ORAL at 17:06

## 2021-06-05 RX ADMIN — PANTOPRAZOLE SODIUM 40 MG: 40 TABLET, DELAYED RELEASE ORAL at 05:18

## 2021-06-05 RX ADMIN — METHOCARBAMOL TABLETS 500 MG: 500 TABLET, COATED ORAL at 21:56

## 2021-06-05 RX ADMIN — DIPHENHYDRAMINE HCL 25 MG: 25 TABLET ORAL at 21:05

## 2021-06-05 RX ADMIN — BENZTROPINE MESYLATE 1 MG: 1 TABLET ORAL at 17:06

## 2021-06-05 RX ADMIN — LORAZEPAM 0.5 MG: 0.5 TABLET ORAL at 21:05

## 2021-06-05 NOTE — PLAN OF CARE
Problem: Alteration in Thoughts and Perception  Goal: Treatment Goal: Gain control of psychotic behaviors/thinking, reduce/eliminate presenting symptoms and demonstrate improved reality functioning upon discharge  Outcome: Not Progressing  Goal: Verbalize thoughts and feelings  Description: Interventions:  - Promote a nonjudgmental and trusting relationship with the patient through active listening and therapeutic communication  - Assess patient's level of functioning, behavior and potential for risk  - Engage patient in 1 on 1 interactions  - Encourage patient to express fears, feelings, frustrations, and discuss symptoms    - [place] patient to reality, help patient recognize reality-based thinking   - Administer medications as ordered and assess for potential side effects  - Provide the patient education related to the signs and symptoms of the illness and desired effects of prescribed medications  Outcome: Progressing  Goal: Refrain from acting on delusional thinking/internal stimuli  Description: Interventions:  - Monitor patient closely, per order   - Utilize least restrictive measures   - Set reasonable limits, give positive feedback for acceptable   - Administer medications as ordered and monitor of potential side effects  Outcome: Not Progressing  Goal: Agree to be compliant with medication regime, as prescribed and report medication side effects  Description: Interventions:  - Offer appropriate PRN medication and supervise ingestion; conduct AIMS, as needed   Outcome: Progressing  Goal: Attend and participate in unit activities, including therapeutic, recreational, and educational groups  Description: Interventions:  -Encourage Visitation and family involvement in care  Outcome: Progressing  Goal: Recognize dysfunctional thoughts, communicate reality-based thoughts at the time of discharge  Description: Interventions:  - Provide medication and psycho-education to assist patient in compliance and developing insight into his/her illness   Outcome: Not Progressing  Goal: Complete daily ADLs, including personal hygiene independently, as able  Description: Interventions:  - Observe, teach, and assist patient with ADLS  - Monitor and promote a balance of rest/activity, with adequate nutrition and elimination   Outcome: Progressing     Problem: Ineffective Coping  Goal: Cooperates with admission process  Description: Interventions:   - Complete admission process  Outcome: Progressing  Goal: Identifies ineffective coping skills  Outcome: Not Progressing  Goal: Identifies healthy coping skills  Outcome: Not Progressing  Goal: Demonstrates healthy coping skills  Outcome: Progressing  Goal: Participates in unit activities  Description: Interventions:  - Provide therapeutic environment   - Provide required programming   - Redirect inappropriate behaviors   Outcome: Progressing  Goal: Patient/Family participate in treatment and DC plans  Description: Interventions:  - Provide therapeutic environment  Outcome: Progressing  Goal: Patient/Family verbalizes awareness of resources  Outcome: Progressing  Goal: Understands least restrictive measures  Description: Interventions:  - Utilize least restrictive behavior  Outcome: Progressing  Goal: Free from restraint events  Description: - Utilize least restrictive measures   - Provide behavioral interventions   - Redirect inappropriate behaviors   Outcome: Progressing     Problem: Risk for Self Injury/Neglect  Goal: Treatment Goal: Remain safe during length of stay, learn and adopt new coping skills, and be free of self-injurious ideation, impulses and acts at the time of discharge  Outcome: Progressing  Goal: Verbalize thoughts and feelings  Description: Interventions:  - Assess and re-assess patient's lethality and potential for self-injury  - Engage patient in 1:1 interactions, daily, for a minimum of 15 minutes  - Encourage patient to express feelings, fears, frustrations, hopes  - Establish rapport/trust with patient   Outcome: Progressing  Goal: Refrain from harming self  Description: Interventions:  - Monitor patient closely, per order  - Develop a trusting relationship  - Supervise medication ingestion, monitor effects and side effects   Outcome: Progressing  Goal: Attend and participate in unit activities, including therapeutic, recreational, and educational groups  Description: Interventions:  - Provide therapeutic and educational activities daily, encourage attendance and participation, and document same in the medical record  - Obtain collateral information, encourage visitation and family involvement in care   Outcome: Progressing  Goal: Recognize maladaptive responses and adopt new coping mechanisms  Outcome: Not Progressing  Goal: Complete daily ADLs, including personal hygiene independently, as able  Description: Interventions:  - Observe, teach, and assist patient with ADLS  - Monitor and promote a balance of rest/activity, with adequate nutrition and elimination  Outcome: Progressing     Problem: Depression  Goal: Treatment Goal: Demonstrate behavioral control of depressive symptoms, verbalize feelings of improved mood/affect, and adopt new coping skills prior to discharge  Outcome: Not Progressing  Goal: Verbalize thoughts and feelings  Description: Interventions:  - Assess and re-assess patient's level of risk   - Engage patient in 1:1 interactions, daily, for a minimum of 15 minutes   - Encourage patient to express feelings, fears, frustrations, hopes   Outcome: Progressing  Goal: Refrain from harming self  Description: Interventions:  - Monitor patient closely, per order   - Supervise medication ingestion, monitor effects and side effects   Outcome: Progressing  Goal: Refrain from isolation  Description: Interventions:  - Develop a trusting relationship   - Encourage socialization   Outcome: Progressing  Goal: Refrain from self-neglect  Outcome: Progressing  Goal: Attend and participate in unit activities, including therapeutic, recreational, and educational groups  Description: Interventions:  - Provide therapeutic and educational activities daily, encourage attendance and participation, and document same in the medical record   Outcome: Progressing  Goal: Complete daily ADLs, including personal hygiene independently, as able  Description: Interventions:  - Observe, teach, and assist patient with ADLS  -  Monitor and promote a balance of rest/activity, with adequate nutrition and elimination   Outcome: Progressing     Problem: Anxiety  Goal: Anxiety is at manageable level  Description: Interventions:  - Assess and monitor patient's anxiety level  - Monitor for signs and symptoms (heart palpitations, chest pain, shortness of breath, headaches, nausea, feeling jumpy, restlessness, irritable, apprehensive)  - Collaborate with interdisciplinary team and initiate plan and interventions as ordered    - [place] patient to unit/surroundings  - Explain treatment plan  - Encourage participation in care  - Encourage verbalization of concerns/fears  - Identify coping mechanisms  - Assist in developing anxiety-reducing skills  - Administer/offer alternative therapies  - Limit or eliminate stimulants  Outcome: Progressing     Problem: Risk for Violence/Aggression Toward Others  Goal: Treatment Goal: Refrain from acts of violence/aggression during length of stay, and demonstrate improved impulse control at the time of discharge  Outcome: Not Progressing  Goal: Verbalize thoughts and feelings  Description: Interventions:  - Assess and re-assess patient's level of risk, every waking shift  - Engage patient in 1:1 interactions, daily, for a minimum of 15 minutes   - Allow patient to express feelings and frustrations in a safe and non-threatening manner   - Establish rapport/trust with patient   Outcome: Progressing  Goal: Refrain from harming others  Outcome: Progressing  Goal: Refrain from destructive acts on the environment or property  Outcome: Progressing  Goal: Control angry outbursts  Description: Interventions:  - Monitor patient closely, per order  - Ensure early verbal de-escalation  - Monitor prn medication needs  - Set reasonable/therapeutic limits, outline behavioral expectations, and consequences   - Provide a non-threatening milieu, utilizing the least restrictive interventions   Outcome: Not Progressing  Goal: Attend and participate in unit activities, including therapeutic, recreational, and educational groups  Description: Interventions:  - Provide therapeutic and educational activities daily, encourage attendance and participation, and document same in the medical record   Outcome: Progressing  Goal: Identify appropriate positive anger management techniques  Description: Interventions:  - Offer anger management and coping skills groups   - Staff will provide positive feedback for appropriate anger control  Outcome: Not Progressing     Problem: Alteration in Orientation  Goal: Treatment Goal: Demonstrate a reduction of confusion and improved orientation to person, place, time and/or situation upon discharge, according to optimum baseline/ability  Outcome: Not Progressing  Goal: Interact with staff daily  Description: Interventions:  - Assess and re-assess patient's level of orientation  - Engage patient in 1 on 1 interactions, daily, for a minimum of 15 minutes   - Establish rapport/trust with patient   Outcome: Progressing  Goal: Express concerns related to confused thinking related to:  Description: Interventions:  - Encourage patient to express feelings, fears, frustrations, hopes  - Assign consistent caregivers   - [place]/re-orient patient as needed  - Allow comfort items, as appropriate  - Provide visual cues, signs, etc    Outcome: Not Progressing  Goal: Allow medical examinations, as recommended  Description: Interventions:  - Provide physical/neurological exams and/or referrals, per provider   Outcome: Progressing  Goal: Cooperate with recommended testing/procedures  Description: Interventions:  - Determine need for ancillary testing  - Observe for mental status changes  - Implement falls/precaution protocol   Outcome: Progressing  Goal: Attend and participate in unit activities, including therapeutic, recreational, and educational groups  Description: Interventions:  - Provide therapeutic and educational activities daily, encourage attendance and participation, and document same in the medical record   - Provide appropriate opportunities for reminiscence   - Provide a consistent daily routine   - Encourage family contact/visitation   Outcome: Progressing  Goal: Complete daily ADLs, including personal hygiene independently, as able  Description: Interventions:  - Observe, teach, and assist patient with ADLS  Outcome: Progressing     Problem: Individualized Interventions  Goal: Patient will verbalize appropriate use of telephone within 5 days  Description: Interventions:  - Treatment team to determine use of supervised phone privileges   Outcome: Progressing  Goal: Patient will verbalize need for hospitalization and will no longer attempt elopement within 5 days  Description: Interventions:  - Ongoing education to help patient understand need for hospitalization  Outcome: Progressing  Goal: Patient will recognize inappropriate behaviors and develop alternative behaviors within 5 days  Description: Interventions:  - Patient in collaboration with Treatment Team will develop a behavior management plan to help identify effective coping skills to deal with stressors  Outcome: Progressing

## 2021-06-05 NOTE — PROGRESS NOTES
Psychiatry Progress Note [facility] 49 y o  female MRN: [id number]  Unit/Bed#: [facility]BER LOU [facility] 311-63 Encounter: [id number]  Code Status: Level 1 - Full Code    PCP: Naty Calvin DO    Date of Admission:  4/7/2021 1435   Date of Service:  06/05/21    Patient Active Problem List   Diagnosis    Schizoaffective disorder, bipolar type ([facility] )    Uncomplicated alcohol dependence ([facility] )    Suicidal behavior    LYNN (generalized anxiety disorder)    Slow transit constipation    Deficiency of vitamin B    Degenerative disc disease, lumbar    Post-traumatic stress disorder, chronic    Lower extremity weakness    Generalized abdominal pain    Non-intractable vomiting    Medical clearance for psychiatric admission    Carbuncle    Alcohol dependence with unspecified alcohol-induced disorder ([facility] )    Mild intermittent asthma without complication    Tobacco abuse    Ear problem, bilateral    Gastroesophageal reflux disease    Right foot pain     Review of systems:        Received Robaxin for back pain and asking for Ensure to gain weight despite being overweight, otherwise unremarkable  Diagnosis:            Schizoaffective bipolar, alcohol use disorder in early remission  ,Assessment   Overall Status:       Now screaming or yelling episodes but still delusional about receiving messages from TV telling her that the feds are after her a which is fixated delusion    Affect has improved less labile but was paranoid about someone out to get her on the outside  [address]: The patient will continue to require additional inpatient hospital stay due to ongoing mood swings paranoia   Acceptance by patient:  accepting    Hopefulness in recovery:  Living in a group home   Understanding of medications: has good understanding   Involved in reintegration process:  Adjusting to the unit   Trusting in relationship with psychiatrist:  trusting   Justification for dual anti-psychotics: due to lack of response to single antipsychotics   Side effects from treatment: none    Medication changes    none today  Medication Education; Risks and S/E and precautions of all meds given to patient and she did verbalize an understanding   Non-pharmacological treatments   Continue with individual, group, milieu and occupational therapy using recovery principles and psycho-education about accepting illness and the need for treatment  Safety   Safety and communication plan established to target dynamic risk factors discussed above  Discharge Plan     most likely to a group home with the act team again     Interval Progress      No yelling or screaming in the last 24 hours  Still with same fixated delusions about receiving messages from TV that tell her that the feds are after her  Was upset with someone on the outside and was overheard by staff that she was paranoid about someone out to harm her on the outside  Still has some thoughts about staff doing hypnosis on her    Affect has improved she is well groomed attending groups friendly and pleasant when approached with no major outbursts of yelling or exhibiting mood swings     Sleep:               good   appetite:            good   compliance with medications :       good   Side  Effects:                     None reported today   significant events:               Delusional  But not getting agitated and no screaming in response to voices or "psychotic episode"   group attendance:           Attending most of the groups    Mental Status Exam  Appearance: age appropriate, casually dressed, looks older than stated age, underweight   Well groomed found in her room wearing a bathrobe and related well  Behavior: normal, pleasant, cooperative, mildly anxious,  more friendly today and not confrontational  At all   Speech: fluent, clear, coherent, increased rate, hypertalkative,   Circumstantial pressured    Mood: depressed, anxious, euphoric  Affect: labile, reactive, slightly brighter  Thought Process: normal abstract reasoning, less prominent, tends to be pressured and circumstantial and preoccupied perseverating on discharge    Thought Content: some paranoia, ideas of reference, but does appear as if paranoid  No current suicidal homicidal thoughts intent or plans verbalized  No phobias obsessions compulsions or distorted body perceptions elicited  Also believes T he is sending her messages from movie stars checking her if she is good or bad and threatening to get the feds to go after her  Perceptual Disturbances: no auditory hallucinations, no visual hallucinations, denies auditory hallucinations when asked, does not appear responding to internal stimuli, no voices reported today       Hx Risk Factors: chronic mood disorder, chronic psychotic symptoms, alcohol use, limted insight  Sensorium:           Oriented 3 spheres and to situation  Cognition: recent and remote memory grossly intact  Consciousness: alert and awake  Attention: attention span and concentration are age appropriate  Intellect: appears to be of average intelligence  Insight: improving  Judgement: improving  Motor Activity: no abnormal movements     Vitals  Temp:  [97 8 °F (36 6 °C)-98 1 °F (36 7 °C)] 97 8 °F (36 6 °C)  HR:  [] 99  Resp:  [16-17] 17  BP: (113-133)/(59-84) 113/59  No intake or output data in the 24 hours ending 06/05/21 0943    Lab Results:  [facility] Admission Reviewed     Current Facility-Administered Medications   Medication Dose Route Frequency Provider Last Rate    acetaminophen  650 mg Oral Q6H PRN Lisette Osler Lodics, CRNP      acetaminophen  650 mg Oral Q4H PRN Lisette Osler Lodics, CRNP      acetaminophen  975 mg Oral Q6H PRN JARED Jones      al mag oxide-diphenhydramine-lidocaine viscous  10 mL Swish & Swallow Q4H PRN JARED Hickey      albuterol  2 puff Inhalation Q6H PRN JARED Butts  aluminum-magnesium hydroxide-simethicone  15 mL Oral Q4H PRN Jasmyn GRANADOS Sowmya, CRNP      benztropine  1 mg Intramuscular Q4H PRN Max 6/day Isis Porterics, CRNP      benztropine  1 mg Oral Q4H PRN Max 6/day Isis Porterics, CRNP      benztropine  1 mg Oral BID Lisa Alonzo MD      calcium carbonate  500 mg Oral TID PRN Salomon Velasquez Lodics, CRNP      diphenhydrAMINE  25 mg Oral HS Isis NAGEL Lodics, CRNP      fenofibrate  145 mg Oral Daily Isis N Lodics, CRNP      gabapentin  600 mg Oral 4x Daily Isis Porterics, CRNP      haloperidol  1 mg Oral 4x Daily Eze Plata MD      haloperidol  5 mg Oral Q6H PRN Isis NAGEL Lodics, CRNP      hydrOXYzine HCL  25 mg Oral Q6H PRN Max 4/day Isis Porterics, CRNP      hydrOXYzine HCL  50 mg Oral Q6H PRN Max 4/day Isis Porterics, CRNP      lidocaine  1 patch Topical Daily Isis Porterics, CRNP      LORazepam  1 mg Intramuscular Q6H PRN Eze Plata MD      LORazepam  0 5 mg Oral 4x Daily Eze Plata MD      magnesium hydroxide  30 mL Oral Daily PRN Salomon Porterics, CRNP      methocarbamol  500 mg Oral Q8H PRN Eze Plata MD      nicotine  1 patch Transdermal Daily Chan Rickie, CRNP      OLANZapine  2 5 mg Oral Q8H PRN Chan Rickie, CRNP      OLANZapine  5 mg Oral Q3H PRN Chan Rickie, CRNP      OLANZapine  7 5 mg Oral Q3H PRN Chan Rickie, CRNP      OLANZapine  10 mg Intramuscular Q3H PRN Chan Rickie, CRNP      OLANZapine  5 mg Intramuscular Q3H PRN Chan Rickie, CRNP      OLANZapine  10 mg Oral HS Iqra Caal MD      OLANZapine  15 mg Oral Daily Elvin Terrazas MD      paliperidone palmitate ER  234 mg Intramuscular Q30 Days Eze Plata MD      pantoprazole  40 mg Oral Early Morning Ronnie Levine, CRNP      Pramox-PE-Glycerin-Petrolatum  1 application Rectal 4x Daily PRN Salomon Porterics, CRNP      prazosin  1 mg Oral HS Chan Uribery, CRNP      psyllium  1 packet Oral Daily Jasmyn L JARED Deng      sertraline  50 mg Oral Daily Talisha Bosch MD         Counseling / Coordination of Care: Total floor / unit time spent today 15 minutes  Greater than 50% of total time was spent with the patient and / or family counseling and / or somewhat receptive to supportive listening and teaching positive coping skills to deal with symptom mangement  Patient's Rights, confidentiality and exceptions to confidentiality, use of automated medical record, [address] [place] [address] staff access to medical record, and consent to treatment reviewed  This note was  not shared with the patient because it might further aggravate her psychiatric condition  This note has been dictated

## 2021-06-05 NOTE — NURSING NOTE
Sabrina Mcmanus has been visible intermittently in the milieu  Behavior controlled  No angry outbursts or yelling in her room  No interaction with peers noted  Ate 100% of her breakfast and swallowed all pills  Lidoderm (to lower back)  and Nicotine (right arm) patches applied after she showered this AM  Attended community meeting  Used the unit tablet during electronic group  Requested/given Robaxin 500mg po at 1046 for 9/10 muscle spasms in lower back and right foot  Relief obtained to 3/10  Requesting that she gets ear drops ordered again for her right ear  Stated that it is blocked and cannot hear because of it  Although holds the phone to her right ear when conversing  Medical to assess her ear  Phone calls made this shift  Continue to monitor  Precautions maintained

## 2021-06-05 NOTE — NURSING NOTE
Patient was isolative to her room but later on seen walking the hallway with no peers interactions  Rate anxiety 2 on 4, denies all other s/s at this time  No complaint offered  Patient was yelling in her room and when this writer went and told her that will be put down in her note patient said we should make a deal If I do not put it in her note she will stop yelling   Went out for fresh air but was not long there  Had good appetite for dinner and snack  Robaxin was given at 2156 for muscle spasm per patient;s request  Patient was cooperative and compliant with all her medications  Safety checks ongoing

## 2021-06-06 PROBLEM — H93.91 EAR PROBLEM, RIGHT: Status: ACTIVE | Noted: 2021-06-06

## 2021-06-06 PROCEDURE — 99231 SBSQ HOSP IP/OBS SF/LOW 25: CPT | Performed by: PHYSICIAN ASSISTANT

## 2021-06-06 PROCEDURE — 99232 SBSQ HOSP IP/OBS MODERATE 35: CPT | Performed by: PSYCHIATRY & NEUROLOGY

## 2021-06-06 RX ADMIN — HALOPERIDOL 1 MG: 1 TABLET ORAL at 11:57

## 2021-06-06 RX ADMIN — DICLOFENAC SODIUM 2 G: 10 GEL TOPICAL at 17:39

## 2021-06-06 RX ADMIN — BENZTROPINE MESYLATE 1 MG: 1 TABLET ORAL at 08:46

## 2021-06-06 RX ADMIN — SERTRALINE HYDROCHLORIDE 50 MG: 50 TABLET ORAL at 08:46

## 2021-06-06 RX ADMIN — PSYLLIUM HUSK 1 PACKET: 3.4 POWDER ORAL at 08:47

## 2021-06-06 RX ADMIN — NICOTINE 1 PATCH: 21 PATCH, EXTENDED RELEASE TRANSDERMAL at 08:47

## 2021-06-06 RX ADMIN — LORAZEPAM 0.5 MG: 0.5 TABLET ORAL at 08:45

## 2021-06-06 RX ADMIN — GABAPENTIN 600 MG: 300 CAPSULE ORAL at 08:45

## 2021-06-06 RX ADMIN — LORAZEPAM 0.5 MG: 0.5 TABLET ORAL at 11:57

## 2021-06-06 RX ADMIN — HALOPERIDOL 1 MG: 1 TABLET ORAL at 08:46

## 2021-06-06 RX ADMIN — HALOPERIDOL 1 MG: 1 TABLET ORAL at 21:36

## 2021-06-06 RX ADMIN — DIPHENHYDRAMINE HCL 25 MG: 25 TABLET ORAL at 21:36

## 2021-06-06 RX ADMIN — LORAZEPAM 0.5 MG: 0.5 TABLET ORAL at 17:03

## 2021-06-06 RX ADMIN — PANTOPRAZOLE SODIUM 40 MG: 40 TABLET, DELAYED RELEASE ORAL at 06:03

## 2021-06-06 RX ADMIN — DICLOFENAC SODIUM 2 G: 10 GEL TOPICAL at 11:57

## 2021-06-06 RX ADMIN — LIDOCAINE 1 PATCH: 50 PATCH CUTANEOUS at 08:47

## 2021-06-06 RX ADMIN — OLANZAPINE 10 MG: 10 TABLET, FILM COATED ORAL at 21:36

## 2021-06-06 RX ADMIN — GABAPENTIN 600 MG: 300 CAPSULE ORAL at 11:57

## 2021-06-06 RX ADMIN — OLANZAPINE 15 MG: 5 TABLET, FILM COATED ORAL at 08:45

## 2021-06-06 RX ADMIN — BENZTROPINE MESYLATE 1 MG: 1 TABLET ORAL at 17:03

## 2021-06-06 RX ADMIN — LORAZEPAM 0.5 MG: 0.5 TABLET ORAL at 21:36

## 2021-06-06 RX ADMIN — GABAPENTIN 600 MG: 300 CAPSULE ORAL at 21:36

## 2021-06-06 RX ADMIN — GABAPENTIN 600 MG: 300 CAPSULE ORAL at 17:03

## 2021-06-06 RX ADMIN — FENOFIBRATE 145 MG: 145 TABLET ORAL at 08:46

## 2021-06-06 RX ADMIN — HALOPERIDOL 1 MG: 1 TABLET ORAL at 17:03

## 2021-06-06 NOTE — PROGRESS NOTES
[address] [facility]  Progress Note Angelica Ou 1970, 49 y o  female MRN: [id number]  Unit/Bed#: AMBER LOU [facility] 112-01 Encounter: [id number]  Primary Care Provider: John Balderas DO   Date and time admitted to hospital: 2021  2:35 PM    Ear problem, right  Assessment & Plan  · Contacted by nursing, patient requesting debrox for cerumen in right ear  · Physical exam reveals no evidence of cerumen with 100% visualization of TMs bilaterally, no erythema, perforation, retraction or bulging  · No medical intervention is needed at this time      Nurse Coordination of Care Discussion:  Discussed assessment and plan YUDY Gentile  with Specialists or Other Care Team Provider:  No    Education and Discussions with Family / Patient: Answered patient's questions to the best my ability    Time Spent for Care: 15 minutes  More than 50% of total time spent on counseling and coordination of care as described above  Current Length of Stay: 60 day(s)    Code Status: Level 1 - Full Code      Subjective:   Contacted by nursing, patient requesting Debrox for excessive cerumen in right ear    Patient states she feels like she has a lot of wax in her right ear  Reports that she has used Debrox in the past for this  Denies any ear pain, discharge, or hearing loss in right ear  Objective:     Vitals:   Temp (24hrs), Av 8 °F (36 6 °C), Min:97 6 °F (36 4 °C), Max:98 °F (36 7 °C)    Temp:  [97 6 °F (36 4 °C)-98 °F (36 7 °C)] 97 6 °F (36 4 °C)  HR:  [] 87  Resp:  [18-20] 18  BP: (106-141)/(75-94) 112/79  Body mass index is 26 kg/m²  Physical Exam:     Physical Exam  Constitutional:       Appearance: Normal appearance  HENT:      Right Ear: Tympanic membrane, ear canal and external ear normal  No drainage  There is no impacted cerumen  No foreign body  No mastoid tenderness  Tympanic membrane is not injected, perforated, erythematous, retracted or bulging        Left Ear: Tympanic membrane, ear canal and external ear normal  No drainage  There is no impacted cerumen  No foreign body  No mastoid tenderness  Tympanic membrane is not injected, perforated, erythematous, retracted or bulging  Ears:      Comments: 100% visualization of BL TMs  Neurological:      General: No focal deficit present  Mental Status: She is alert and oriented to person, place, and time  Additional Data:     Labs:                            * I Have Reviewed All Lab Data Listed Above  * Additional Pertinent Lab Tests Reviewed:  All Labs Within Last 24 Hours Reviewed    Imaging:    Imaging Reports Reviewed Today Include: No imaging reviewed today      Last 24 Hours Medication List:   Current Facility-Administered Medications   Medication Dose Route Frequency Provider Last Rate    acetaminophen  650 mg Oral Q6H PRN Kathy Matildad Germanics, CRNP      acetaminophen  650 mg Oral Q4H PRN Kathy Matildad Germanics, CRNP      acetaminophen  975 mg Oral Q6H PRN Isis Lama, CRNP      al mag oxide-diphenhydramine-lidocaine viscous  10 mL Swish & Swallow Q4H PRN Willapa Flatten, CRNP      albuterol  2 puff Inhalation Q6H PRN Isis Lama, CRNP      aluminum-magnesium hydroxide-simethicone  15 mL Oral Q4H PRN Jasmyn L Sowmya, CRNP      benztropine  1 mg Intramuscular Q4H PRN Max 6/day Isis Lama, CRNP      benztropine  1 mg Oral Q4H PRN Max 6/day Isis Lama, CRNP      benztropine  1 mg Oral BID Jasper Whitfield MD      calcium carbonate  500 mg Oral TID PRN Kathy Lama, CRNP      Diclofenac Sodium  2 g Topical 4x Daily PRN Cecily Lux PA-C      diphenhydrAMINE  25 mg Oral HS Isis Lama, CRNP      fenofibrate  145 mg Oral Daily Isis Lama, CRNP      gabapentin  600 mg Oral 4x Daily Isis Lama, CRNP      haloperidol  1 mg Oral 4x Daily Geoff Cowan MD      haloperidol  5 mg Oral Q6H PRN Isis Lama, CRNP      hydrOXYzine HCL  25 mg Oral Q6H PRN Max 4/day Isis Lama, CRNP      hydrOXYzine HCL  50 mg Oral Q6H PRN Max 4/day Isis Lama, CRNP      lidocaine  1 patch Topical Daily Isis Lama, CRNP      LORazepam  1 mg Intramuscular Q6H PRN Magdalena Hatch MD      LORazepam  0 5 mg Oral 4x Daily Magdalena Hatch MD      magnesium hydroxide  30 mL Oral Daily PRN Bere Elena Daina, CRNP      methocarbamol  500 mg Oral Q8H PRN Magdalena Hatch MD      nicotine  1 patch Transdermal Daily Poinsett Nilson, CRNP      OLANZapine  2 5 mg Oral Q8H PRN Poinsett Nilson, CRNP      OLANZapine  5 mg Oral Q3H PRN Poinsett Nilson, CRNP      OLANZapine  7 5 mg Oral Q3H PRN Poinsett Nilson, CRNP      OLANZapine  10 mg Intramuscular Q3H PRN Poinsett Nilson, CRNP      OLANZapine  5 mg Intramuscular Q3H PRN Poinsett Nilson, CRNP      OLANZapine  10 mg Oral HS Francesca Rojo MD      OLANZapine  15 mg Oral Daily Francesca Rojo MD      paliperidone palmitate ER  234 mg Intramuscular Q30 Days Magdalena Hatch MD      pantoprazole  40 mg Oral Early Morning Nina Cooper, CRNP      Pramox-PE-Glycerin-Petrolatum  1 application Rectal 4x Daily PRN Bere Elena Daina, CRNP      prazosin  1 mg Oral HS Poinsett Nilson, CRNP      psyllium  1 packet Oral Daily Jasmyn DARWIN Degn, CRNP      sertraline  50 mg Oral Daily Magdalena Hatch MD          Today, Patient Was Seen By: Amarilis Chisholm PA-C      ** Please Note: Dictation voice to text software may have been used in the creation of this document   **

## 2021-06-06 NOTE — NURSING NOTE
Pt appears anxious on approach and with constricted affect  Pt visible in common areas of unit, although not observed to be socializing with peers  Pt without and verbal outbursts today  Pt c/o chronic pain in right ankle and voltaren gel was applied  Pt med meal compliant  Will continue to monitor an dprovide therapeutic support

## 2021-06-06 NOTE — NURSING NOTE
Patient was isolative to her room and came out only for meals and to make her needs known to staff  Denies all s/s at this time but c/o ankle and back pain 6 on 10  No behavioral issues noted  Patient had a good appetite for dinner and snack  Held minipress due to parameter  Patient was cooperative and compliant with all her medications  Safety checks ongoing

## 2021-06-06 NOTE — PROGRESS NOTES
Psychiatry Progress Note [facility] 49 y o  female MRN: [id number]  Unit/Bed#: [facility] 426-73 Encounter: [id number]  Code Status: Level 1 - Full Code    PCP: Stephania Short DO    Date of Admission:  4/7/2021 1435   Date of Service:  06/06/21    Patient Active Problem List   Diagnosis    Schizoaffective disorder, bipolar type ([facility] )    Uncomplicated alcohol dependence ([facility] )    Suicidal behavior    LYNN (generalized anxiety disorder)    Slow transit constipation    Deficiency of vitamin B    Degenerative disc disease, lumbar    Post-traumatic stress disorder, chronic    Lower extremity weakness    Generalized abdominal pain    Non-intractable vomiting    Medical clearance for psychiatric admission    Carbuncle    Alcohol dependence with unspecified alcohol-induced disorder ([facility] )    Mild intermittent asthma without complication    Tobacco abuse    Ear problem, bilateral    Gastroesophageal reflux disease    Right foot pain     Review of systems:        Again needed Robaxin for back spasm and pain otherwise unremarkable  Diagnosis:            Schizoaffective bipolar, alcohol use disorder in early remission,Assessment   Overall Status: There was 1 episode of yelling and screaming in her room and was asking staff not to chart it  Otherwise friendly and pleasant with same delusions   Certification Statement: The patient will continue to require additional inpatient hospital stay due to ongoing mood swings paranoia   Acceptance by patient:  accepting    Hopefulness in recovery:  Living in a group home   Understanding of medications: has good understanding   Involved in reintegration process:  Adjusting to the unit   Trusting in relationship with psychiatrist:  trusting   Justification for dual anti-psychotics: due to lack of response to single antipsychotics   Side effects from treatment: none    Medication changes    none today  Medication Education;  Risks and S/E and precautions of all meds given to patient and she did verbalize an understanding   Non-pharmacological treatments   Continue with individual, group, milieu and occupational therapy using recovery principles and psycho-education about accepting illness and the need for treatment  Safety   Safety and communication plan established to target dynamic risk factors discussed above  Discharge Plan     most likely to a group home with the act team again     Interval Progress      He was yelling again for a brief period yesterday and was asking staff not to documented and was trying to bargain with them  Still hears messages from TV that tell her that the feds are after her which is a fixated delusions  Still paranoid about people out to harm her on the outside and people talking about her and staff doing hypnosis on her but does not seem to be preoccupied with it unless asked  She is well groomed well kept friendly pleasant and is interacting with staff and peers  No major outbursts and mood has been slightly grandiose    Not aggressive agitated or threatening or self-abusive or destructive   Sleep:                good   appetite:             good   compliance with medications :      Compliance is good   Side  Effects:                      None reported   significant events:                Still delusional occasional screaming but markedly less   group attendance:            Attending most of the groups    Mental Status Exam  Appearance: age appropriate, casually dressed, looks older than stated age, underweight    Well groomed friendly pleasant cooperative  Behavior: normal, pleasant, cooperative, mildly anxious,  more friendly today and not confrontational   lately   Speech: fluent, clear, coherent, increased rate, hypertalkative,   Circumstantial pressured    Mood: depressed, anxious, euphoric  Affect: labile, reactive, slightly brighter  Thought Process: normal abstract reasoning, less prominent, tends to be pressured and circumstantial and preoccupied perseverating on discharge    Thought Content: some paranoia, ideas of reference, but does appear as if paranoid  No current suicidal homicidal thoughts intent or plans verbalized  No phobias obsessions compulsions or distorted body perceptions elicited  Also believes T he is sending her messages from movie stars checking her if she is good or bad and threatening to get the feds to go after her  Perceptual Disturbances: no auditory hallucinations, no visual hallucinations, denies auditory hallucinations when asked, does not appear responding to internal stimuli, no voices reported today       Hx Risk Factors: chronic mood disorder, chronic psychotic symptoms, alcohol use, limted insight  Sensorium:            Oriented x3 spheres and to situation  Cognition: recent and remote memory grossly intact  Consciousness: alert and awake  Attention: attention span and concentration are age appropriate  Intellect: appears to be of average intelligence  Insight: improving  Judgement: improving  Motor Activity: no abnormal movements     Vitals  Temp:  [97 5 °F (36 4 °C)-98 °F (36 7 °C)] 98 °F (36 7 °C)  HR:  [] 87  Resp:  [20] 20  BP: (106-143)/(75-94) 106/75  No intake or output data in the 24 hours ending 06/06/21 0851    Lab Results:  [facility] Admission Reviewed     Current Facility-Administered Medications   Medication Dose Route Frequency Provider Last Rate    acetaminophen  650 mg Oral Q6H PRN [place] [employer], CRNP      acetaminophen  650 mg Oral Q4H PRN [place] [employer], CRNP      acetaminophen  975 mg Oral Q6H PRN JARED Jones      al mag oxide-diphenhydramine-lidocaine viscous  10 mL Swish & Swallow Q4H PRN JARED Snachez      albuterol  2 puff Inhalation Q6H PRN JARED Jones      aluminum-magnesium hydroxide-simethicone  15 mL Oral Q4H PRN JARED Isbell      benztropine  1 mg Intramuscular Q4H PRN Max 6/day Isis Lama, CRNP      benztropine  1 mg Oral Q4H PRN Max 6/day Isis Lama, CRNP      benztropine  1 mg Oral BID Kuldip Navarro MD      calcium carbonate  500 mg Oral TID PRN Cristiano Lama, CRNP      diphenhydrAMINE  25 mg Oral HS Isis Lama, CRNP      fenofibrate  145 mg Oral Daily Isis Lama, CRNP      gabapentin  600 mg Oral 4x Daily Isis Lama, CRNP      haloperidol  1 mg Oral 4x Daily Gage Hernández MD      haloperidol  5 mg Oral Q6H PRN Isis Lama, CRNP      hydrOXYzine HCL  25 mg Oral Q6H PRN Max 4/day Isis Lama, CRNP      hydrOXYzine HCL  50 mg Oral Q6H PRN Max 4/day Isis Lama, CRNP      lidocaine  1 patch Topical Daily Isis Lama, CRNP      LORazepam  1 mg Intramuscular Q6H PRN Gage Hernández MD      LORazepam  0 5 mg Oral 4x Daily Gage Hernández MD      magnesium hydroxide  30 mL Oral Daily PRN Cristiano Lama, CRNP      methocarbamol  500 mg Oral Q8H PRN Gage Hernández MD      nicotine  1 patch Transdermal Daily Latonya Nguyen, CRNP      OLANZapine  2 5 mg Oral Q8H PRN Lurene Patrick, CRNP      OLANZapine  5 mg Oral Q3H PRN Lurene Patrick, CRNP      OLANZapine  7 5 mg Oral Q3H PRN Saundrarene Patrick, CRNP      OLANZapine  10 mg Intramuscular Q3H PRN Lurene Patrick, CRNP      OLANZapine  5 mg Intramuscular Q3H PRN Saundrarene Patrick, CRNP      OLANZapine  10 mg Oral HS Bárbara Smith MD      OLANZapine  15 mg Oral Daily Bárbara Smith MD      paliperidone palmitate ER  234 mg Intramuscular Q30 Days Gage Hernández MD      pantoprazole  40 mg Oral Early Morning Bernal Locket, CRNP      Pramox-PE-Glycerin-Petrolatum  1 application Rectal 4x Daily PRN Cristiano Lama, CRNP      prazosin  1 mg Oral HS Latonya Nguyen, CRNP      psyllium  1 packet Oral Daily Jasmyn Deng, CRNP      sertraline  50 mg Oral Daily Gage Hernández MD         Counseling / Coordination of Care: Total floor / unit time spent today 15 minutes  Greater than 50% of total time was spent with the patient and / or family counseling and / or somewhat receptive to supportive listening and teaching positive coping skills to deal with symptom mangement  Patient's Rights, confidentiality and exceptions to confidentiality, use of automated medical record, Howie Atkinson staff access to medical record, and consent to treatment reviewed  This note was  not shared with the patient because it might further aggravate her psychiatric condition  This note has been dictated

## 2021-06-06 NOTE — PLAN OF CARE
Problem: Risk for Self Injury/Neglect  Goal: Treatment Goal: Remain safe during length of stay, learn and adopt new coping skills, and be free of self-injurious ideation, impulses and acts at the time of discharge  Outcome: Progressing  Goal: Verbalize thoughts and feelings  Description: Interventions:  - Assess and re-assess patient's lethality and potential for self-injury  - Engage patient in 1:1 interactions, daily, for a minimum of 15 minutes  - Encourage patient to express feelings, fears, frustrations, hopes  - Establish rapport/trust with patient   Outcome: Progressing  Goal: Refrain from harming self  Description: Interventions:  - Monitor patient closely, per order  - Develop a trusting relationship  - Supervise medication ingestion, monitor effects and side effects   Outcome: Progressing  Goal: Attend and participate in unit activities, including therapeutic, recreational, and educational groups  Description: Interventions:  - Provide therapeutic and educational activities daily, encourage attendance and participation, and document same in the medical record  - Obtain collateral information, encourage visitation and family involvement in care   Outcome: Progressing  Goal: Recognize maladaptive responses and adopt new coping mechanisms  Outcome: Progressing  Goal: Complete daily ADLs, including personal hygiene independently, as able  Description: Interventions:  - Observe, teach, and assist patient with ADLS  - Monitor and promote a balance of rest/activity, with adequate nutrition and elimination  Outcome: Progressing     Problem: Depression  Goal: Treatment Goal: Demonstrate behavioral control of depressive symptoms, verbalize feelings of improved mood/affect, and adopt new coping skills prior to discharge  Outcome: Progressing  Goal: Verbalize thoughts and feelings  Description: Interventions:  - Assess and re-assess patient's level of risk   - Engage patient in 1:1 interactions, daily, for a minimum of 15 minutes   - Encourage patient to express feelings, fears, frustrations, hopes   Outcome: Progressing  Goal: Refrain from harming self  Description: Interventions:  - Monitor patient closely, per order   - Supervise medication ingestion, monitor effects and side effects   Outcome: Progressing  Goal: Refrain from isolation  Description: Interventions:  - Develop a trusting relationship   - Encourage socialization   Outcome: Progressing  Goal: Refrain from self-neglect  Outcome: Progressing  Goal: Attend and participate in unit activities, including therapeutic, recreational, and educational groups  Description: Interventions:  - Provide therapeutic and educational activities daily, encourage attendance and participation, and document same in the medical record   Outcome: Progressing  Goal: Complete daily ADLs, including personal hygiene independently, as able  Description: Interventions:  - Observe, teach, and assist patient with ADLS  -  Monitor and promote a balance of rest/activity, with adequate nutrition and elimination   Outcome: Progressing

## 2021-06-06 NOTE — ASSESSMENT & PLAN NOTE
· Contacted by nursing, patient requesting debrox for cerumen in right ear  · Physical exam reveals no evidence of cerumen with 100% visualization of TMs bilaterally, no erythema, perforation, retraction or bulging  · No medical intervention is needed at this time

## 2021-06-06 NOTE — PLAN OF CARE
Problem: Alteration in Thoughts and Perception  Goal: Treatment Goal: Gain control of psychotic behaviors/thinking, reduce/eliminate presenting symptoms and demonstrate improved reality functioning upon discharge  Outcome: Progressing  Goal: Verbalize thoughts and feelings  Description: Interventions:  - Promote a nonjudgmental and trusting relationship with the patient through active listening and therapeutic communication  - Assess patient's level of functioning, behavior and potential for risk  - Engage patient in 1 on 1 interactions  - Encourage patient to express fears, feelings, frustrations, and discuss symptoms    - Adell patient to reality, help patient recognize reality-based thinking   - Administer medications as ordered and assess for potential side effects  - Provide the patient education related to the signs and symptoms of the illness and desired effects of prescribed medications  Outcome: Progressing  Goal: Refrain from acting on delusional thinking/internal stimuli  Description: Interventions:  - Monitor patient closely, per order   - Utilize least restrictive measures   - Set reasonable limits, give positive feedback for acceptable   - Administer medications as ordered and monitor of potential side effects  Outcome: Progressing  Goal: Agree to be compliant with medication regime, as prescribed and report medication side effects  Description: Interventions:  - Offer appropriate PRN medication and supervise ingestion; conduct AIMS, as needed   Outcome: Progressing  Goal: Attend and participate in unit activities, including therapeutic, recreational, and educational groups  Description: Interventions:  -Encourage Visitation and family involvement in care  Outcome: Progressing  Goal: Recognize dysfunctional thoughts, communicate reality-based thoughts at the time of discharge  Description: Interventions:  - Provide medication and psycho-education to assist patient in compliance and developing insight into his/her illness   Outcome: Progressing  Goal: Complete daily ADLs, including personal hygiene independently, as able  Description: Interventions:  - Observe, teach, and assist patient with ADLS  - Monitor and promote a balance of rest/activity, with adequate nutrition and elimination   Outcome: Progressing     Problem: Ineffective Coping  Goal: Cooperates with admission process  Description: Interventions:   - Complete admission process  Outcome: Progressing  Goal: Identifies ineffective coping skills  Outcome: Progressing  Goal: Identifies healthy coping skills  Outcome: Progressing  Goal: Demonstrates healthy coping skills  Outcome: Progressing  Goal: Participates in unit activities  Description: Interventions:  - Provide therapeutic environment   - Provide required programming   - Redirect inappropriate behaviors   Outcome: Progressing  Goal: Patient/Family participate in treatment and DC plans  Description: Interventions:  - Provide therapeutic environment  Outcome: Progressing  Goal: Patient/Family verbalizes awareness of resources  Outcome: Progressing  Goal: Understands least restrictive measures  Description: Interventions:  - Utilize least restrictive behavior  Outcome: Progressing  Goal: Free from restraint events  Description: - Utilize least restrictive measures   - Provide behavioral interventions   - Redirect inappropriate behaviors   Outcome: Progressing     Problem: Risk for Self Injury/Neglect  Goal: Treatment Goal: Remain safe during length of stay, learn and adopt new coping skills, and be free of self-injurious ideation, impulses and acts at the time of discharge  Outcome: Progressing  Goal: Verbalize thoughts and feelings  Description: Interventions:  - Assess and re-assess patient's lethality and potential for self-injury  - Engage patient in 1:1 interactions, daily, for a minimum of 15 minutes  - Encourage patient to express feelings, fears, frustrations, hopes  - Establish rapport/trust with patient   Outcome: Progressing  Goal: Refrain from harming self  Description: Interventions:  - Monitor patient closely, per order  - Develop a trusting relationship  - Supervise medication ingestion, monitor effects and side effects   Outcome: Progressing  Goal: Attend and participate in unit activities, including therapeutic, recreational, and educational groups  Description: Interventions:  - Provide therapeutic and educational activities daily, encourage attendance and participation, and document same in the medical record  - Obtain collateral information, encourage visitation and family involvement in care   Outcome: Progressing  Goal: Recognize maladaptive responses and adopt new coping mechanisms  Outcome: Progressing  Goal: Complete daily ADLs, including personal hygiene independently, as able  Description: Interventions:  - Observe, teach, and assist patient with ADLS  - Monitor and promote a balance of rest/activity, with adequate nutrition and elimination  Outcome: Progressing     Problem: Depression  Goal: Treatment Goal: Demonstrate behavioral control of depressive symptoms, verbalize feelings of improved mood/affect, and adopt new coping skills prior to discharge  Outcome: Progressing  Goal: Verbalize thoughts and feelings  Description: Interventions:  - Assess and re-assess patient's level of risk   - Engage patient in 1:1 interactions, daily, for a minimum of 15 minutes   - Encourage patient to express feelings, fears, frustrations, hopes   Outcome: Progressing  Goal: Refrain from harming self  Description: Interventions:  - Monitor patient closely, per order   - Supervise medication ingestion, monitor effects and side effects   Outcome: Progressing  Goal: Refrain from isolation  Description: Interventions:  - Develop a trusting relationship   - Encourage socialization   Outcome: Progressing  Goal: Refrain from self-neglect  Outcome: Progressing  Goal: Attend and participate in unit activities, including therapeutic, recreational, and educational groups  Description: Interventions:  - Provide therapeutic and educational activities daily, encourage attendance and participation, and document same in the medical record   Outcome: Progressing  Goal: Complete daily ADLs, including personal hygiene independently, as able  Description: Interventions:  - Observe, teach, and assist patient with ADLS  -  Monitor and promote a balance of rest/activity, with adequate nutrition and elimination   Outcome: Progressing     Problem: Anxiety  Goal: Anxiety is at manageable level  Description: Interventions:  - Assess and monitor patient's anxiety level  - Monitor for signs and symptoms (heart palpitations, chest pain, shortness of breath, headaches, nausea, feeling jumpy, restlessness, irritable, apprehensive)  - Collaborate with interdisciplinary team and initiate plan and interventions as ordered    - [place] patient to unit/surroundings  - Explain treatment plan  - Encourage participation in care  - Encourage verbalization of concerns/fears  - Identify coping mechanisms  - Assist in developing anxiety-reducing skills  - Administer/offer alternative therapies  - Limit or eliminate stimulants  Outcome: Progressing     Problem: Risk for Violence/Aggression Toward Others  Goal: Treatment Goal: Refrain from acts of violence/aggression during length of stay, and demonstrate improved impulse control at the time of discharge  Outcome: Progressing  Goal: Verbalize thoughts and feelings  Description: Interventions:  - Assess and re-assess patient's level of risk, every waking shift  - Engage patient in 1:1 interactions, daily, for a minimum of 15 minutes   - Allow patient to express feelings and frustrations in a safe and non-threatening manner   - Establish rapport/trust with patient   Outcome: Progressing  Goal: Refrain from harming others  Outcome: Progressing  Goal: Refrain from destructive acts on the environment or property  Outcome: Progressing  Goal: Control angry outbursts  Description: Interventions:  - Monitor patient closely, per order  - Ensure early verbal de-escalation  - Monitor prn medication needs  - Set reasonable/therapeutic limits, outline behavioral expectations, and consequences   - Provide a non-threatening milieu, utilizing the least restrictive interventions   Outcome: Progressing  Goal: Attend and participate in unit activities, including therapeutic, recreational, and educational groups  Description: Interventions:  - Provide therapeutic and educational activities daily, encourage attendance and participation, and document same in the medical record   Outcome: Progressing  Goal: Identify appropriate positive anger management techniques  Description: Interventions:  - Offer anger management and coping skills groups   - Staff will provide positive feedback for appropriate anger control  Outcome: Progressing     Problem: Alteration in Orientation  Goal: Treatment Goal: Demonstrate a reduction of confusion and improved orientation to person, place, time and/or situation upon discharge, according to optimum baseline/ability  Outcome: Progressing  Goal: Interact with staff daily  Description: Interventions:  - Assess and re-assess patient's level of orientation  - Engage patient in 1 on 1 interactions, daily, for a minimum of 15 minutes   - Establish rapport/trust with patient   Outcome: Progressing  Goal: Express concerns related to confused thinking related to:  Description: Interventions:  - Encourage patient to express feelings, fears, frustrations, hopes  - Assign consistent caregivers   - [place]/re-orient patient as needed  - Allow comfort items, as appropriate  - Provide visual cues, signs, etc    Outcome: Progressing  Goal: Allow medical examinations, as recommended  Description: Interventions:  - Provide physical/neurological exams and/or referrals, per provider   Outcome: Progressing  Goal: Cooperate with recommended testing/procedures  Description: Interventions:  - Determine need for ancillary testing  - Observe for mental status changes  - Implement falls/precaution protocol   Outcome: Progressing  Goal: Attend and participate in unit activities, including therapeutic, recreational, and educational groups  Description: Interventions:  - Provide therapeutic and educational activities daily, encourage attendance and participation, and document same in the medical record   - Provide appropriate opportunities for reminiscence   - Provide a consistent daily routine   - Encourage family contact/visitation   Outcome: Progressing  Goal: Complete daily ADLs, including personal hygiene independently, as able  Description: Interventions:  - Observe, teach, and assist patient with ADLS  Outcome: Progressing     Problem: Individualized Interventions  Goal: Patient will verbalize appropriate use of telephone within 5 days  Description: Interventions:  - Treatment team to determine use of supervised phone privileges   Outcome: Progressing  Goal: Patient will verbalize need for hospitalization and will no longer attempt elopement within 5 days  Description: Interventions:  - Ongoing education to help patient understand need for hospitalization  Outcome: Progressing  Goal: Patient will recognize inappropriate behaviors and develop alternative behaviors within 5 days  Description: Interventions:  - Patient in collaboration with Treatment Team will develop a behavior management plan to help identify effective coping skills to deal with stressors  Outcome: Progressing

## 2021-06-07 PROCEDURE — 99232 SBSQ HOSP IP/OBS MODERATE 35: CPT | Performed by: PSYCHIATRY & NEUROLOGY

## 2021-06-07 RX ADMIN — HALOPERIDOL 1 MG: 1 TABLET ORAL at 12:29

## 2021-06-07 RX ADMIN — PSYLLIUM HUSK 1 PACKET: 3.4 POWDER ORAL at 08:44

## 2021-06-07 RX ADMIN — GABAPENTIN 600 MG: 300 CAPSULE ORAL at 12:29

## 2021-06-07 RX ADMIN — PANTOPRAZOLE SODIUM 40 MG: 40 TABLET, DELAYED RELEASE ORAL at 06:25

## 2021-06-07 RX ADMIN — OLANZAPINE 10 MG: 10 TABLET, FILM COATED ORAL at 21:06

## 2021-06-07 RX ADMIN — LORAZEPAM 0.5 MG: 0.5 TABLET ORAL at 08:44

## 2021-06-07 RX ADMIN — GABAPENTIN 600 MG: 300 CAPSULE ORAL at 17:02

## 2021-06-07 RX ADMIN — BENZTROPINE MESYLATE 1 MG: 1 TABLET ORAL at 08:44

## 2021-06-07 RX ADMIN — GABAPENTIN 600 MG: 300 CAPSULE ORAL at 21:06

## 2021-06-07 RX ADMIN — HALOPERIDOL 1 MG: 1 TABLET ORAL at 08:44

## 2021-06-07 RX ADMIN — NICOTINE 1 PATCH: 21 PATCH, EXTENDED RELEASE TRANSDERMAL at 09:39

## 2021-06-07 RX ADMIN — LORAZEPAM 0.5 MG: 0.5 TABLET ORAL at 17:02

## 2021-06-07 RX ADMIN — LORAZEPAM 0.5 MG: 0.5 TABLET ORAL at 21:06

## 2021-06-07 RX ADMIN — SERTRALINE HYDROCHLORIDE 50 MG: 50 TABLET ORAL at 08:43

## 2021-06-07 RX ADMIN — OLANZAPINE 15 MG: 5 TABLET, FILM COATED ORAL at 08:43

## 2021-06-07 RX ADMIN — GABAPENTIN 600 MG: 300 CAPSULE ORAL at 08:43

## 2021-06-07 RX ADMIN — BENZTROPINE MESYLATE 1 MG: 1 TABLET ORAL at 17:02

## 2021-06-07 RX ADMIN — LIDOCAINE 1 PATCH: 50 PATCH CUTANEOUS at 08:43

## 2021-06-07 RX ADMIN — DIPHENHYDRAMINE HCL 25 MG: 25 TABLET ORAL at 21:06

## 2021-06-07 RX ADMIN — LORAZEPAM 0.5 MG: 0.5 TABLET ORAL at 12:29

## 2021-06-07 RX ADMIN — METHOCARBAMOL TABLETS 500 MG: 500 TABLET, COATED ORAL at 13:35

## 2021-06-07 RX ADMIN — OLANZAPINE 5 MG: 5 TABLET, ORALLY DISINTEGRATING ORAL at 18:45

## 2021-06-07 RX ADMIN — NICOTINE POLACRILEX 4 MG: 4 GUM, CHEWING ORAL at 13:35

## 2021-06-07 RX ADMIN — HALOPERIDOL 1 MG: 1 TABLET ORAL at 21:06

## 2021-06-07 RX ADMIN — FENOFIBRATE 145 MG: 145 TABLET ORAL at 09:38

## 2021-06-07 RX ADMIN — DICLOFENAC SODIUM 2 G: 10 GEL TOPICAL at 15:55

## 2021-06-07 RX ADMIN — HALOPERIDOL 1 MG: 1 TABLET ORAL at 17:02

## 2021-06-07 NOTE — NURSING NOTE
Alert, labile, loud and speaking with a pressured tone  Visible intermittently  No SI or HI noted  Denies depression, and pain  Pt this am yelling out occasionally throughout the unit this shift and noted talking to self  Pt stated to writer @ 21 [phone]  that we are trying to over feed her and she is getting as fat as porky pig  She said that the doctor is hyponotizing her  She said they are trying to tell her things when asked by writer who they are, pt states the people in the ceiling  Pt after 40mins  stated that she was going to use coping skills to get through the anxiety she feels and was not in need of a PRN  Pt then attended recovery group @ [phone]  Attended [facility], [facility], [facility] and journaling groups  Consumed 100% of breakfast and 100% of lunch  Took all medication without prompting  Maintained on safe precautions without incident  Will continue to monitor progress and recovery program  Pt came to nurse's station to inform writer that she has some redness where her nicotine patch was  Kevin Watson made aware and gave N O nicorette gum 4mg q 2hrs PRN

## 2021-06-07 NOTE — CASE MANAGEMENT
Cancelled dental appointment for tomorrow, since pt has another one on the 10th to address dental issues (cavity fillings)   SW requested to combine the treatment on June 10th if possible to reduce number of trips she is making out of hospital  Tomorrow's appt cancelled, next June 10th at 9:20AM  SW will coordinate transportation and update team

## 2021-06-07 NOTE — PROGRESS NOTES
Psychiatry Progress Note Paulie Hill [age] y o  female MRN: [id number]  Unit/Bed#: [facility] [place] 093-45 Encounter: [id number]  Code Status: Level 1 - Full Code    PCP: aNty Calvin DO    Date of Admission:  4/7/2021 1435   Date of Service:  06/07/21    Patient Active Problem List   Diagnosis    Schizoaffective disorder, bipolar type ([facility] )    Uncomplicated alcohol dependence ([facility] )    Suicidal behavior    LYNN (generalized anxiety disorder)    Slow transit constipation    Deficiency of vitamin B    Degenerative disc disease, lumbar    Post-traumatic stress disorder, chronic    Lower extremity weakness    Generalized abdominal pain    Non-intractable vomiting    Medical clearance for psychiatric admission    Carbuncle    Alcohol dependence with unspecified alcohol-induced disorder ([facility] )    Mild intermittent asthma without complication    Tobacco abuse    Ear problem, bilateral    Gastroesophageal reflux disease    Right foot pain    Ear problem, right     Review of systems:        Was somatic yesterday claiming she has earwax and was checked by medical and found to have none    Still complains of back pain and spasms off and on but otherwise unremarkable  Diagnosis:             Schizoaffective bipolar, alcohol use disorder in early remission  Assessment   Overall Status:        No recent outburst of yelling or screaming and appearing brighter in affect interacting well with staff and peers   Certification Statement: The patient will continue to require additional inpatient hospital stay due to ongoing mood swings paranoia   Acceptance by patient:  accepting    Hopefulness in recovery:  Living in a group home   Understanding of medications: has good understanding   Involved in reintegration process:  Adjusting to the unit   Trusting in relationship with psychiatrist:  trusting   Justification for dual anti-psychotics: due to lack of response to single antipsychotics   Side effects from treatment: none    Medication changes    none today  Medication Education; Risks and S/E and precautions of all meds given to patient and she did verbalize an understanding   Non-pharmacological treatments   Continue with individual, group, milieu and occupational therapy using recovery principles and psycho-education about accepting illness and the need for treatment  Safety   Safety and communication plan established to target dynamic risk factors discussed above  Discharge Plan     most likely to a group home with the act team again     Interval Progress      Diogenes mariscal reported yesterday  Still hears voices from TV attending her that the feds are after her in continues to his suspect that people are doing harm to her and staff is doing hypnosis on her but does not talk about them unless asked  Remains well groomed well kept friendly pleasant with a brighter affect when approached  No major outbursts  Reported mood has been stable but still comes across as slightly grandiose and elated    Not aggressive or agitated or threatening self-abusive or destructive on the unit but reports she feels agitated but has managed to refrain from asking for p r n  meds for the same      Sleep:                 good   appetite:              good   compliance with medications :       good   Side  Effects:                     None reported today   significant events:                 Occasionally screaming but markedly less often and still delusional   group attendance:             Attending most of the groups 4/6    Mental Status Exam  Appearance: age appropriate, casually dressed, looks older than stated age, underweight     In friendly pleasant cooperative well groomed found sitting on a bed in her room  Behavior: normal, pleasant, cooperative, mildly anxious,  more friendly today and not confrontational   today   Speech: fluent, clear, coherent, increased rate, hypertalkative, Circumstantial pressured    Mood: depressed, anxious, euphoric  Affect: labile, reactive, slightly brighter  Thought Process: normal abstract reasoning, less prominent, tends to be pressured and circumstantial and preoccupied perseverating on discharge    Thought Content: some paranoia, ideas of reference, but does appear as if paranoid  No current suicidal homicidal thoughts intent or plans verbalized  No phobias obsessions compulsions or distorted body perceptions elicited  Also believes T he is sending her messages from movie stars checking her if she is good or bad and threatening to get the feds to go after her  Perceptual Disturbances: no auditory hallucinations, no visual hallucinations, denies auditory hallucinations when asked, does not appear responding to internal stimuli, no voices reported today       Hx Risk Factors: chronic mood disorder, chronic psychotic symptoms, alcohol use, limted insight  Sensorium:         Oriented to 3 spheres and to situation  Cognition: recent and remote memory grossly intact  Consciousness: alert and awake  Attention: attention span and concentration are age appropriate  Intellect: appears to be of average intelligence  Insight: improving  Judgement: improving  Motor Activity: no abnormal movements     Vitals  Temp:  [97 6 °F (36 4 °C)-98 °F (36 7 °C)] 97 6 °F (36 4 °C)  HR:  [87-91] 91  Resp:  [18-20] 18  BP: ()/(68-79) 99/68  No intake or output data in the 24 hours ending 06/07/21 0622    Lab Results:  [facility] Admission Reviewed     Current Facility-Administered Medications   Medication Dose Route Frequency Provider Last Rate    acetaminophen  650 mg Oral Q6H PRN JARED Bowie      acetaminophen  650 mg Oral Q4H PRN JARED Bowie      acetaminophen  975 mg Oral Q6H PRN JARED Jones      al mag oxide-diphenhydramine-lidocaine viscous  10 mL Swish & Swallow Q4H PRN JARED Pineda      albuterol  2 puff Inhalation Q6H PRN Isis Porterics, CRNP      aluminum-magnesium hydroxide-simethicone  15 mL Oral Q4H PRN Jasmyn Pelletiererrat, CRNP      benztropine  1 mg Intramuscular Q4H PRN Max 6/day Isis Porterics, CRNP      benztropine  1 mg Oral Q4H PRN Max 6/day Isis Porterics, CRNP      benztropine  1 mg Oral BID Leo Gutiérrez MD      calcium carbonate  500 mg Oral TID PRN Eyal Ganser Lodics, CRNP      Diclofenac Sodium  2 g Topical 4x Daily PRN Juliane Lee PA-C      diphenhydrAMINE  25 mg Oral HS Isis Porterics, CRNP      fenofibrate  145 mg Oral Daily Isis Porterics, CRNP      gabapentin  600 mg Oral 4x Daily Isis Porterics, CRNP      haloperidol  1 mg Oral 4x Daily Mariel Philip MD      haloperidol  5 mg Oral Q6H PRN Isis Porterics, CRNP      hydrOXYzine HCL  25 mg Oral Q6H PRN Max 4/day Isis Porterics, CRNP      hydrOXYzine HCL  50 mg Oral Q6H PRN Max 4/day Isis Porterics, CRNP      lidocaine  1 patch Topical Daily Isis Porterics, CRNP      LORazepam  1 mg Intramuscular Q6H PRN Mariel Philip MD      LORazepam  0 5 mg Oral 4x Daily Mariel Philip MD      magnesium hydroxide  30 mL Oral Daily PRN Eyal Ganser Lodics, CRNP      methocarbamol  500 mg Oral Q8H PRN Mariel Philip MD      nicotine  1 patch Transdermal Daily [place] Och, CRNP      OLANZapine  2 5 mg Oral Q8H PRN Jasmyn Och, CRNP      OLANZapine  5 mg Oral Q3H PRN [place] Och, CRNP      OLANZapine  7 5 mg Oral Q3H PRN [place] Och, CRNP      OLANZapine  10 mg Intramuscular Q3H PRN [place] Och, CRNP      OLANZapine  5 mg Intramuscular Q3H PRN [place] Och, CRNP      OLANZapine  10 mg Oral HS Eladia Dawson MD      OLANZapine  15 mg Oral Daily Eladia Dawson MD      paliperidone palmitate ER  234 mg Intramuscular Q30 Days Mariel Philip MD      pantoprazole  40 mg Oral Early Morning Aminah Pizano, JARED      Pramox-PE-Glycerin-Petrolatum  1 application Rectal 4x Daily JARED Perez      prazosin  1 mg Oral HS JARED Esteban      psyllium  1 packet Oral Daily JARED Isbell      sertraline  50 mg Oral Daily Gage Hernández MD         Counseling / Coordination of Care: Total floor / unit time spent today 15 minutes  Greater than 50% of total time was spent with the patient and / or family counseling and / or somewhat receptive to supportive listening and teaching positive coping skills to deal with symptom mangement  Patient's Rights, confidentiality and exceptions to confidentiality, use of automated medical record, [facility] staff access to medical record, and consent to treatment reviewed  This note was  not shared with the patient because it might further aggravate her psychiatric condition  This note has been dictated

## 2021-06-07 NOTE — PLAN OF CARE
Problem: Alteration in Thoughts and Perception  Goal: Treatment Goal: Gain control of psychotic behaviors/thinking, reduce/eliminate presenting symptoms and demonstrate improved reality functioning upon discharge  6/7/2021 1904 by Sherley Garcias RN  Outcome: Not Progressing  6/7/2021 1831 by Sherley Garcias RN  Outcome: Progressing  Goal: Verbalize thoughts and feelings  Description: Interventions:  - Promote a nonjudgmental and trusting relationship with the patient through active listening and therapeutic communication  - Assess patient's level of functioning, behavior and potential for risk  - Engage patient in 1 on 1 interactions  - Encourage patient to express fears, feelings, frustrations, and discuss symptoms    - [place] patient to reality, help patient recognize reality-based thinking   - Administer medications as ordered and assess for potential side effects  - Provide the patient education related to the signs and symptoms of the illness and desired effects of prescribed medications  6/7/2021 1904 by Sherley Garcias RN  Outcome: Progressing  6/7/2021 1831 by Sherley Garcias RN  Outcome: Progressing  Goal: Refrain from acting on delusional thinking/internal stimuli  Description: Interventions:  - Monitor patient closely, per order   - Utilize least restrictive measures   - Set reasonable limits, give positive feedback for acceptable   - Administer medications as ordered and monitor of potential side effects  6/7/2021 1904 by Sherley Garcias RN  Outcome: Not Progressing  6/7/2021 1831 by Sherley Garcias RN  Outcome: Not Progressing  Goal: Agree to be compliant with medication regime, as prescribed and report medication side effects  Description: Interventions:  - Offer appropriate PRN medication and supervise ingestion; conduct AIMS, as needed   6/7/2021 1904 by Sherley Garcias RN  Outcome: Progressing  6/7/2021 1831 by Sherley Garcias RN  Outcome: Progressing  Goal: Attend and participate in unit activities, including therapeutic, recreational, and educational groups  Description: Interventions:  -Encourage Visitation and family involvement in care  6/7/2021 1904 by Laurie Ochoa RN  Outcome: Progressing  6/7/2021 1831 by Laurie Ochoa RN  Outcome: Progressing  Goal: Recognize dysfunctional thoughts, communicate reality-based thoughts at the time of discharge  Description: Interventions:  - Provide medication and psycho-education to assist patient in compliance and developing insight into his/her illness   6/7/2021 1904 by Laurie Ochoa RN  Outcome: Not Progressing  6/7/2021 1831 by Laurie Ochoa RN  Outcome: Not Progressing  Goal: Complete daily ADLs, including personal hygiene independently, as able  Description: Interventions:  - Observe, teach, and assist patient with ADLS  - Monitor and promote a balance of rest/activity, with adequate nutrition and elimination   6/7/2021 1904 by Laurie Ochoa RN  Outcome: Progressing  6/7/2021 1831 by Laurie Ochoa RN  Outcome: Progressing     Problem: Ineffective Coping  Goal: Cooperates with admission process  Description: Interventions:   - Complete admission process  6/7/2021 1904 by Laurie Ochoa RN  Outcome: Progressing  6/7/2021 1831 by Laurie Ochoa RN  Outcome: Progressing  Goal: Identifies ineffective coping skills  6/7/2021 1904 by Laurie Ochoa RN  Outcome: Not Progressing  6/7/2021 1831 by Laurie Ochoa RN  Outcome: Progressing  Goal: Identifies healthy coping skills  6/7/2021 1904 by Laurie Ochoa RN  Outcome: Progressing  6/7/2021 1831 by Laurie Ochoa RN  Outcome: Progressing  Goal: Demonstrates healthy coping skills  6/7/2021 1904 by Laurie Ochoa RN  Outcome: Not Progressing  6/7/2021 1831 by Laurie Ochoa RN  Outcome: Progressing  Goal: Participates in unit activities  Description: Interventions:  - Provide therapeutic environment   - Provide required programming   - Redirect inappropriate behaviors   6/7/2021 1904 by Beka Garcia RN  Outcome: Progressing  6/7/2021 1831 by Beka Garcia RN  Outcome: Progressing  Goal: Patient/Family participate in treatment and DC plans  Description: Interventions:  - Provide therapeutic environment  6/7/2021 1904 by Beka Garcia RN  Outcome: Progressing  6/7/2021 1831 by Beka Garcia RN  Outcome: Progressing  Goal: Patient/Family verbalizes awareness of resources  6/7/2021 1904 by Beka Garcia RN  Outcome: Progressing  6/7/2021 1831 by Beka Garcia RN  Outcome: Progressing  Goal: Understands least restrictive measures  Description: Interventions:  - Utilize least restrictive behavior  6/7/2021 1904 by Beka Garcia RN  Outcome: Progressing  6/7/2021 1831 by Beka Garcia RN  Outcome: Progressing  Goal: Free from restraint events  Description: - Utilize least restrictive measures   - Provide behavioral interventions   - Redirect inappropriate behaviors   6/7/2021 1904 by Beka Garcia RN  Outcome: Progressing  6/7/2021 1831 by Beka Garcia RN  Outcome: Progressing     Problem: Risk for Self Injury/Neglect  Goal: Treatment Goal: Remain safe during length of stay, learn and adopt new coping skills, and be free of self-injurious ideation, impulses and acts at the time of discharge  6/7/2021 1904 by Beka Garcia RN  Outcome: Progressing  6/7/2021 1831 by Beka Garcia RN  Outcome: Progressing  Goal: Verbalize thoughts and feelings  Description: Interventions:  - Assess and re-assess patient's lethality and potential for self-injury  - Engage patient in 1:1 interactions, daily, for a minimum of 15 minutes  - Encourage patient to express feelings, fears, frustrations, hopes  - Establish rapport/trust with patient   6/7/2021 1904 by Beka Garcia RN  Outcome: Progressing  6/7/2021 1831 by Roger Napoles RN  Outcome: Progressing  Goal: Refrain from harming self  Description: Interventions:  - Monitor patient closely, per order  - Develop a trusting relationship  - Supervise medication ingestion, monitor effects and side effects   6/7/2021 1904 by Roger Napoles RN  Outcome: Progressing  6/7/2021 1831 by Roger Napoles RN  Outcome: Progressing  Goal: Attend and participate in unit activities, including therapeutic, recreational, and educational groups  Description: Interventions:  - Provide therapeutic and educational activities daily, encourage attendance and participation, and document same in the medical record  - Obtain collateral information, encourage visitation and family involvement in care   6/7/2021 1904 by Roger Napoles RN  Outcome: Progressing  6/7/2021 1831 by Roger Napoles RN  Outcome: Progressing  Goal: Recognize maladaptive responses and adopt new coping mechanisms  6/7/2021 1904 by Roger Napoles RN  Outcome: Not Progressing  6/7/2021 1831 by Roger Napoles RN  Outcome: Not Progressing  Goal: Complete daily ADLs, including personal hygiene independently, as able  Description: Interventions:  - Observe, teach, and assist patient with ADLS  - Monitor and promote a balance of rest/activity, with adequate nutrition and elimination  6/7/2021 1904 by Roger Napoles RN  Outcome: Progressing  6/7/2021 1831 by Roger Napoles RN  Outcome: Progressing     Problem: Depression  Goal: Treatment Goal: Demonstrate behavioral control of depressive symptoms, verbalize feelings of improved mood/affect, and adopt new coping skills prior to discharge  6/7/2021 1904 by Roger Napoles RN  Outcome: Not Progressing  6/7/2021 1831 by Roger Napoles RN  Outcome: Progressing  Goal: Verbalize thoughts and feelings  Description: Interventions:  - Assess and re-assess patient's level of risk   - Engage patient in 1:1 interactions, daily, for a minimum of 15 minutes   - Encourage patient to express feelings, fears, frustrations, hopes   6/7/2021 1904 by Chana Correa RN  Outcome: Progressing  6/7/2021 1831 by Chana Correa RN  Outcome: Progressing  Goal: Refrain from harming self  Description: Interventions:  - Monitor patient closely, per order   - Supervise medication ingestion, monitor effects and side effects   6/7/2021 1904 by Chana Correa RN  Outcome: Progressing  6/7/2021 1831 by Chana Correa RN  Outcome: Progressing  Goal: Refrain from isolation  Description: Interventions:  - Develop a trusting relationship   - Encourage socialization   6/7/2021 1904 by Chana Correa RN  Outcome: Progressing  6/7/2021 1831 by Chana Correa RN  Outcome: Progressing  Goal: Refrain from self-neglect  6/7/2021 1904 by Chana Correa RN  Outcome: Progressing  6/7/2021 1831 by Chana Correa RN  Outcome: Progressing  Goal: Attend and participate in unit activities, including therapeutic, recreational, and educational groups  Description: Interventions:  - Provide therapeutic and educational activities daily, encourage attendance and participation, and document same in the medical record   6/7/2021 1904 by Chana Correa RN  Outcome: Progressing  6/7/2021 1831 by Chana Correa RN  Outcome: Progressing  Goal: Complete daily ADLs, including personal hygiene independently, as able  Description: Interventions:  - Observe, teach, and assist patient with ADLS  -  Monitor and promote a balance of rest/activity, with adequate nutrition and elimination   6/7/2021 1904 by Chana Correa RN  Outcome: Progressing  6/7/2021 1831 by Chana Correa RN  Outcome: Progressing     Problem: Anxiety  Goal: Anxiety is at manageable level  Description: Interventions:  - Assess and monitor patient's anxiety level     - Monitor for signs and symptoms (heart palpitations, chest pain, shortness of breath, headaches, nausea, feeling jumpy, restlessness, irritable, apprehensive)  - Collaborate with interdisciplinary team and initiate plan and interventions as ordered    - [place] patient to unit/surroundings  - Explain treatment plan  - Encourage participation in care  - Encourage verbalization of concerns/fears  - Identify coping mechanisms  - Assist in developing anxiety-reducing skills  - Administer/offer alternative therapies  - Limit or eliminate stimulants  6/7/2021 1904 by Hugh Bullock RN  Outcome: Not Progressing  6/7/2021 1831 by Hugh Bullock RN  Outcome: Progressing     Problem: Risk for Violence/Aggression Toward Others  Goal: Treatment Goal: Refrain from acts of violence/aggression during length of stay, and demonstrate improved impulse control at the time of discharge  6/7/2021 1904 by Hugh Bullock RN  Outcome: Progressing  6/7/2021 1831 by Hugh Bullock RN  Outcome: Progressing  Goal: Verbalize thoughts and feelings  Description: Interventions:  - Assess and re-assess patient's level of risk, every waking shift  - Engage patient in 1:1 interactions, daily, for a minimum of 15 minutes   - Allow patient to express feelings and frustrations in a safe and non-threatening manner   - Establish rapport/trust with patient   6/7/2021 1904 by Hugh Bullock RN  Outcome: Progressing  6/7/2021 1831 by Hugh Bullock RN  Outcome: Progressing  Goal: Refrain from harming others  6/7/2021 1904 by Hugh Bullock RN  Outcome: Progressing  6/7/2021 1831 by Hugh Bullock RN  Outcome: Progressing  Goal: Refrain from destructive acts on the environment or property  6/7/2021 1904 by Hugh Bullock RN  Outcome: Progressing  6/7/2021 1831 by Hugh Bullock RN  Outcome: Progressing  Goal: Control angry outbursts  Description: Interventions:  - Monitor patient closely, per order  - Ensure early verbal de-escalation  - Monitor prn medication needs  - Set reasonable/therapeutic limits, outline behavioral expectations, and consequences   - Provide a non-threatening milieu, utilizing the least restrictive interventions   6/7/2021 1904 by Shlomo Modi RN  Outcome: Not Progressing  6/7/2021 1831 by Shlomo Modi RN  Outcome: Progressing  Goal: Attend and participate in unit activities, including therapeutic, recreational, and educational groups  Description: Interventions:  - Provide therapeutic and educational activities daily, encourage attendance and participation, and document same in the medical record   6/7/2021 1904 by Shlomo Modi RN  Outcome: Progressing  6/7/2021 1831 by Shlomo Modi RN  Outcome: Progressing  Goal: Identify appropriate positive anger management techniques  Description: Interventions:  - Offer anger management and coping skills groups   - Staff will provide positive feedback for appropriate anger control  6/7/2021 1904 by Shlomo Modi RN  Outcome: Not Progressing  6/7/2021 1831 by Shlomo Modi RN  Outcome: Progressing     Problem: Alteration in Orientation  Goal: Treatment Goal: Demonstrate a reduction of confusion and improved orientation to person, place, time and/or situation upon discharge, according to optimum baseline/ability  6/7/2021 1904 by Shlomo Modi RN  Outcome: Not Progressing  6/7/2021 1831 by Shlomo Modi RN  Outcome: Progressing  Goal: Interact with staff daily  Description: Interventions:  - Assess and re-assess patient's level of orientation  - Engage patient in 1 on 1 interactions, daily, for a minimum of 15 minutes   - Establish rapport/trust with patient   6/7/2021 1904 by Shlomo Modi RN  Outcome: Progressing  6/7/2021 1831 by Shlomo Modi RN  Outcome: Progressing  Goal: Express concerns related to confused thinking related to:  Description: Interventions:  - Encourage patient to express feelings, fears, frustrations, hopes  - Assign consistent caregivers   - [place]/re-orient patient as needed  - Allow comfort items, as appropriate  - Provide visual cues, signs, etc    6/7/2021 1904 by Sg Goldman RN  Outcome: Not Progressing  6/7/2021 1831 by Sg Goldman RN  Outcome: Progressing  Goal: Allow medical examinations, as recommended  Description: Interventions:  - Provide physical/neurological exams and/or referrals, per provider   6/7/2021 1904 by Sg Goldman RN  Outcome: Progressing  6/7/2021 1831 by Sg Goldman RN  Outcome: Progressing  Goal: Cooperate with recommended testing/procedures  Description: Interventions:  - Determine need for ancillary testing  - Observe for mental status changes  - Implement falls/precaution protocol   6/7/2021 1904 by Sg Goldman RN  Outcome: Progressing  6/7/2021 1831 by Sg Goldman RN  Outcome: Progressing  Goal: Attend and participate in unit activities, including therapeutic, recreational, and educational groups  Description: Interventions:  - Provide therapeutic and educational activities daily, encourage attendance and participation, and document same in the medical record   - Provide appropriate opportunities for reminiscence   - Provide a consistent daily routine   - Encourage family contact/visitation   6/7/2021 1904 by Sg Goldman RN  Outcome: Progressing  6/7/2021 1831 by Sg Goldman RN  Outcome: Progressing  Goal: Complete daily ADLs, including personal hygiene independently, as able  Description: Interventions:  - Observe, teach, and assist patient with ADLS  7/0/2203 0969 by Sg Goldman RN  Outcome: Progressing  6/7/2021 1831 by Sg Goldman RN  Outcome: Progressing     Problem: Individualized Interventions  Goal: Patient will verbalize appropriate use of telephone within 5 days  Description: Interventions:  - Treatment team to determine use of supervised phone privileges   6/7/2021 1904 by Sg Goldman RN  Outcome: Progressing  6/7/2021 1831 by Marilynn Swift RN  Outcome: Progressing  Goal: Patient will verbalize need for hospitalization and will no longer attempt elopement within 5 days  Description: Interventions:  - Ongoing education to help patient understand need for hospitalization  6/7/2021 1904 by Marilynn Swift RN  Outcome: Progressing  6/7/2021 1831 by Marilynn Swift RN  Outcome: Progressing  Goal: Patient will recognize inappropriate behaviors and develop alternative behaviors within 5 days  Description: Interventions:  - Patient in collaboration with Treatment Team will develop a behavior management plan to help identify effective coping skills to deal with stressors  6/7/2021 1904 by Marilynn Swift RN  Outcome: Progressing  6/7/2021 1831 by Marilynn Swift RN  Outcome: Progressing

## 2021-06-07 NOTE — PROGRESS NOTES
06/07/21 0942   Team Meeting   Meeting Type Daily Rounds   Initial Conference Date 06/07/21   Patient/Family Present   Patient Present No   Patient's Family Present No         Daily Rounds Documentation  Team Members Participating  MD Warren Patel, YUDY Chapman, MARK ANTHONY Vizcarra, MARK ANTHONY Mackey, ANNALISE Arizmendi RN    Case reviewed  Still delusional but not as overt  Periods of yelling also appear to have diminished but still has short outbursts here and there  Medication and meal compliant  No behavioral issues  Fixated on discharge  5/6 groups

## 2021-06-07 NOTE — NURSING NOTE
Patient c/o bilateral foot pain saying "it feels like it is broken; the bones in my feet feel broken" Volteran gel to the sole of each foot  This helped temporarily but at 1915 she c/o pain once again  However, she had just received Zyprexa 5mg for moderate agitation at 1845  She was screaming stating "why are we doing this to her? "; adding "I am having a psychotic break" She was disorganized and tangential at that time  There was no further incidents of yelling or screaming    She was calm for the remainder of the evening

## 2021-06-07 NOTE — PROGRESS NOTES
06/05/21 1300   Activity/Group Checklist   Group Other (Comment)  (OPEN STUDIO Art Therapy/Social Interaction, Free Expression)   Attendance Attended   Attendance Duration (min) 46-60   Interactions Did not interact  (Patient sat apart from group; observed only)   Affect/Mood Appropriate   Goals Achieved Able to listen to others; Able to engage in interactions

## 2021-06-08 PROCEDURE — 99232 SBSQ HOSP IP/OBS MODERATE 35: CPT | Performed by: PSYCHIATRY & NEUROLOGY

## 2021-06-08 RX ADMIN — BENZTROPINE MESYLATE 1 MG: 1 TABLET ORAL at 17:35

## 2021-06-08 RX ADMIN — DICLOFENAC SODIUM 2 G: 10 GEL TOPICAL at 22:07

## 2021-06-08 RX ADMIN — SERTRALINE HYDROCHLORIDE 50 MG: 50 TABLET ORAL at 08:15

## 2021-06-08 RX ADMIN — LORAZEPAM 0.5 MG: 0.5 TABLET ORAL at 11:58

## 2021-06-08 RX ADMIN — LORAZEPAM 0.5 MG: 0.5 TABLET ORAL at 08:14

## 2021-06-08 RX ADMIN — GABAPENTIN 600 MG: 300 CAPSULE ORAL at 21:16

## 2021-06-08 RX ADMIN — LIDOCAINE 1 PATCH: 50 PATCH CUTANEOUS at 08:12

## 2021-06-08 RX ADMIN — BENZTROPINE MESYLATE 1 MG: 1 TABLET ORAL at 08:15

## 2021-06-08 RX ADMIN — GABAPENTIN 600 MG: 300 CAPSULE ORAL at 17:35

## 2021-06-08 RX ADMIN — GABAPENTIN 600 MG: 300 CAPSULE ORAL at 11:58

## 2021-06-08 RX ADMIN — DIPHENHYDRAMINE HCL 25 MG: 25 TABLET ORAL at 21:17

## 2021-06-08 RX ADMIN — OLANZAPINE 5 MG: 5 TABLET, ORALLY DISINTEGRATING ORAL at 08:14

## 2021-06-08 RX ADMIN — PHENYTOIN 1 MG: 125 SUSPENSION ORAL at 21:17

## 2021-06-08 RX ADMIN — HYDROXYZINE HYDROCHLORIDE 50 MG: 50 TABLET, FILM COATED ORAL at 18:35

## 2021-06-08 RX ADMIN — OLANZAPINE 15 MG: 5 TABLET, FILM COATED ORAL at 08:18

## 2021-06-08 RX ADMIN — NICOTINE 1 PATCH: 21 PATCH, EXTENDED RELEASE TRANSDERMAL at 08:12

## 2021-06-08 RX ADMIN — HALOPERIDOL 1 MG: 1 TABLET ORAL at 08:13

## 2021-06-08 RX ADMIN — HALOPERIDOL 1 MG: 1 TABLET ORAL at 17:35

## 2021-06-08 RX ADMIN — METHOCARBAMOL TABLETS 500 MG: 500 TABLET, COATED ORAL at 22:37

## 2021-06-08 RX ADMIN — PANTOPRAZOLE SODIUM 40 MG: 40 TABLET, DELAYED RELEASE ORAL at 05:54

## 2021-06-08 RX ADMIN — FENOFIBRATE 145 MG: 145 TABLET ORAL at 08:14

## 2021-06-08 RX ADMIN — DICLOFENAC SODIUM 2 G: 10 GEL TOPICAL at 08:57

## 2021-06-08 RX ADMIN — ACETAMINOPHEN 650 MG: 325 TABLET ORAL at 22:46

## 2021-06-08 RX ADMIN — HALOPERIDOL 1 MG: 1 TABLET ORAL at 11:58

## 2021-06-08 RX ADMIN — LORAZEPAM 0.5 MG: 0.5 TABLET ORAL at 21:16

## 2021-06-08 RX ADMIN — HALOPERIDOL 1 MG: 1 TABLET ORAL at 21:17

## 2021-06-08 RX ADMIN — OLANZAPINE 10 MG: 10 TABLET, FILM COATED ORAL at 21:17

## 2021-06-08 RX ADMIN — LORAZEPAM 0.5 MG: 0.5 TABLET ORAL at 17:35

## 2021-06-08 RX ADMIN — PSYLLIUM HUSK 1 PACKET: 3.4 POWDER ORAL at 08:13

## 2021-06-08 RX ADMIN — GABAPENTIN 600 MG: 300 CAPSULE ORAL at 08:15

## 2021-06-08 NOTE — NURSING NOTE
Patient was yelling in her room about a man (who she says has been stalking her for years) who "hypnotizes her and followed her to the hospital and is in a room one floor above her room " She claims she hears him through the vents and he is taunting her  She was genuinely anxious and upset  She asked for an IM Ativan  Patient received 50 mg Atarax po at [address] then said that "Atarax makes her suicidal; please have the doctor discontinue that"  He anxiety/agitation diminished and there were no further outbursts; no yelling  She got Volteran gel to the sole of each foot for pain at 1600 with good relief  At 2235 patient c/o "not feeling right" States bilateral foot pain "#4-5" and "back pain/spasms" Received Tylenol 650 mg and Robaxin 500 mg as prn for same   Will monitor

## 2021-06-08 NOTE — PROGRESS NOTES
06/08/21 1013   Team Meeting   Meeting Type Tx Team Meeting   Initial Conference Date 06/08/21   Next Conference Date 06/15/21   Team Members Present   Team Members Present Physician;Nurse;; Other (Discipline and Name)   Physician Team Member Dr Melanie Treadwell MD   Nursing Team Member Jenelle Sainz LPN   Social Work Team Member Denis Morejon LSW   Other (Discipline and Name) Abhishek Grossman [facility] [address]   Patient/Family Present   Patient Present Yes   Patient's Family Present No       Summary: Pt presented for her treatment team meeting this morning prepared, with self assessment  Pt still has slight irritable edge, though mainly calm and is pleasant upon approach  Pt asked SW to read off her assessment because she did not have her glasses on her  Pt reported that "feelings of hypnosis" is her biggest barrier, and explained while other symptoms have subsided this has not  Also reported her use of coping skills include walking and talking to others about what she's experiencing  Pt's goal this past week is being hopeful for discharge soon  She identiied the same for this week, and feels more stable to go to a group home at this time  Treatment team has observed some of this progress and her group attendance as well, consistent, this week was 63%  Pt denied reactions to medications, listed her medications and nurse, and reported medical issue of pain in ankles and feet, as well as legs becoming more stiff from back pain  She has seen medical to address this  Pt practices self care by showering, brushing her teeth and cleaning her clothes regularly  She remains adequately groomed and recently purchased a few self care items and clothing that she needed  Margarita Napier from [facility] shared that patient is being considered for [employer] location [facility] [facility] and that while there is a wait list, she does not anticipate the wait being too long as there seems to be movement   Pt signed several releases earlier with Rachael Chandler so the [facility] can speak with the [address] about patient's status and potential placement  Pt stated she is hopeful about everything and optimistic hearing the update  Meeting then ended mutually after pt reviewed and signed her treatment plan

## 2021-06-08 NOTE — NURSING NOTE
Pt med and meal compliant  Pt visible on unit and sociable with peers  Pt denies any anxiety/depression and stated "I feel good today " Will continue to monitor

## 2021-06-08 NOTE — PROGRESS NOTES
06/08/21 0830   Team Meeting   Meeting Type Daily Rounds   Initial Conference Date 06/08/21   Patient/Family Present   Patient Present No   Patient's Family Present No     Daily Rounds Documentation     Team Members Present:   MD Pedrito Patel LPN Aurther Kass, MARK ANTHONY Martinez    Miserable all day; upset about weight gain  Reported feet pain  Requested Zyprexa PRN for "psychotic break "  Attended 5/5 groups  Compliant with medications and meals  Slept

## 2021-06-08 NOTE — PROGRESS NOTES
Psychiatry Progress Note Paulie Hill [age] y o  female MRN: [id number]  Unit/Bed#: [facility] 861-02 Encounter: [id number]  Code Status: Level 1 - Full Code    PCP: Alexia Carbajal DO    Date of Admission:  4/7/2021 1435   Date of Service:  06/08/21    Patient Active Problem List   Diagnosis    Schizoaffective disorder, bipolar type ([facility] )    Uncomplicated alcohol dependence ([facility] )    Suicidal behavior    LYNN (generalized anxiety disorder)    Slow transit constipation    Deficiency of vitamin B    Degenerative disc disease, lumbar    Post-traumatic stress disorder, chronic    Lower extremity weakness    Generalized abdominal pain    Non-intractable vomiting    Medical clearance for psychiatric admission    Carbuncle    Alcohol dependence with unspecified alcohol-induced disorder ([place] [address] )    Mild intermittent asthma without complication    Tobacco abuse    Ear problem, bilateral    Gastroesophageal reflux disease    Right foot pain    Ear problem, right     Review of systems:         Again somatic about foot pain and  Needed Voltaren otherwise unremarkable  Diagnosis:              Schizoaffective bipolar, alcohol use disorder in early remission  Assessment   Overall Status:        There was 1 episode of loud  yelling  and screaming continues to believe the staff his hip need to using her and still delusional and silverman at times   Certification Statement: The patient will continue to require additional inpatient hospital stay due to ongoing mood swings paranoia   Acceptance by patient:  accepting    Hopefulness in recovery:  Living in a group home   Understanding of medications: has good understanding   Involved in reintegration process:  Adjusting to the unit   Trusting in relationship with psychiatrist:  trusting   Justification for dual anti-psychotics: due to lack of response to single antipsychotics   Side effects from treatment: none    Medication changes    none today  Medication Education; Risks and S/E and precautions of all meds given to patient and she did verbalize an understanding   Non-pharmacological treatments   Continue with individual, group, milieu and occupational therapy using recovery principles and psycho-education about accepting illness and the need for treatment  Safety   Safety and communication plan established to target dynamic risk factors discussed above  Discharge Plan     most likely to a group home with the act team again     Interval Progress       Was again yelling yesterday he 1 time needed p r n  Zyprexa for screaming loudly  Still hears messages from TV telling her that the feds are after her and also believes that the staff is doing hypnosis on her including this writer  Remains well groomed well kept friendly and pleasant with a brighter affect when approached  She is not able to practice positive coping skills and seeks out p r n  medications right away claiming to have psychotic episodes  Has not been aggressive or threatening self-abusive or destructive but feels agitated     Sleep:                   Good   appetite:               good   compliance with medications :       good   Side  Effects:                      None reported   significant events:                  Screaming again and still delusional   group attendance:             Attending most of them, 5/5    Mental Status Exam  Appearance: age appropriate, casually dressed, looks older than stated age, underweight     Attended team meeting wearing a mask today  Behavior: normal, pleasant, cooperative, mildly anxious,  more friendly today and not confrontational     Speech: fluent, clear, coherent, increased rate, hypertalkative,   Circumstantial pressured    Mood: depressed, anxious, euphoric  Affect: labile, reactive, slightly brighter  Thought Process: normal abstract reasoning, less prominent, tends to be pressured and circumstantial and preoccupied perseverating on discharge    Thought Content: some paranoia, ideas of reference, but does appear as if paranoid  No current suicidal homicidal thoughts intent or plans verbalized  No phobias obsessions compulsions or distorted body perceptions elicited  Also believes TV is sending her messages from movie stars checking her if she is good or bad and threatening to get the feds to go after her  Perceptual Disturbances: no auditory hallucinations, no visual hallucinations, denies auditory hallucinations when asked, does not appear responding to internal stimuli, no voices reported today       Hx Risk Factors: chronic mood disorder, chronic psychotic symptoms, alcohol use, limted insight  Sensorium:       Oriented to 3 spheres and to situation   Cognition: recent and remote memory grossly intact  Consciousness: alert and awake  Attention: attention span and concentration are age appropriate  Intellect: appears to be of average intelligence  Insight: improving  Judgement: improving  Motor Activity: no abnormal movements     Vitals  Temp:  [97 5 °F (36 4 °C)-98 °F (36 7 °C)] 97 5 °F (36 4 °C)  HR:  [83-97] 85  Resp:  [16-19] 16  BP: ()/(48-80) 82/48  No intake or output data in the 24 hours ending 06/08/21 0615    Lab Results:  [facility] Admission Reviewed     Current Facility-Administered Medications   Medication Dose Route Frequency Provider Last Rate    acetaminophen  650 mg Oral Q6H PRN Venancio Aloe GermanicsRENATANP      acetaminophen  650 mg Oral Q4H PRN Venancio Mcmahonoe GermanicsRENATANP      acetaminophen  975 mg Oral Q6H PRN JARED Jones      al mag oxide-diphenhydramine-lidocaine viscous  10 mL Swish & Swallow Q4H PRN JARED Jose      albuterol  2 puff Inhalation Q6H PRN JARED Jones      aluminum-magnesium hydroxide-simethicone  15 mL Oral Q4H PRN JARED Isbell      benztropine  1 mg Intramuscular Q4H PRN Max 6/day JARED Jones      benztropine  1 mg Oral Q4H PRN Max 6/day Isis Porterics, CRNP      benztropine  1 mg Oral BID Dariana Ortega MD      calcium carbonate  500 mg Oral TID PRN Armando Bullocks Lodics, CRNP      Diclofenac Sodium  2 g Topical 4x Daily PRN Taco Barbosa PA-C      diphenhydrAMINE  25 mg Oral HS Isis Porterics, CRNP      fenofibrate  145 mg Oral Daily Isis SHONA Germanics, CRNP      gabapentin  600 mg Oral 4x Daily Isis SHONA Germanics, CRNP      haloperidol  1 mg Oral 4x Daily Jayne Chapman MD      haloperidol  5 mg Oral Q6H PRN Armando Bullocks Lodics, CRNP      hydrOXYzine HCL  25 mg Oral Q6H PRN Max 4/day Isis SHONA Germanics, CRNP      hydrOXYzine HCL  50 mg Oral Q6H PRN Max 4/day Isis SHONA Germanics, CRNP      lidocaine  1 patch Topical Daily Isis SHONA Daina, CRNP      LORazepam  1 mg Intramuscular Q6H PRN Jayne Chapman MD      LORazepam  0 5 mg Oral 4x Daily Jayne Chapman MD      magnesium hydroxide  30 mL Oral Daily PRN Armando Bullocks Lodics, CRNP      methocarbamol  500 mg Oral Q8H PRN Jayne Chapman MD      nicotine  1 patch Transdermal Daily Davie Elders, CRNP      nicotine polacrilex  4 mg Oral Q2H PRN Janeen Medrano, CRNP      OLANZapine  2 5 mg Oral Q8H PRN Davie Elders, CRNP      OLANZapine  5 mg Oral Q3H PRN Davie Elders, CRNP      OLANZapine  7 5 mg Oral Q3H PRN Davie Elders, CRNP      OLANZapine  10 mg Intramuscular Q3H PRN Davie Elders, CRNP      OLANZapine  5 mg Intramuscular Q3H PRN Davie Elders, CRNP      OLANZapine  10 mg Oral HS Marlena Navarro MD      OLANZapine  15 mg Oral Daily Marlena Navarro MD      paliperidone palmitate ER  234 mg Intramuscular Q30 Days Jayne Chapman MD      pantoprazole  40 mg Oral Early Morning Janeen Medrano, CRNP      Pramox-PE-Glycerin-Petrolatum  1 application Rectal 4x Daily PRN Armando Bullocks Lodics, CRNP      prazosin  1 mg Oral HS Davie Elders, CRNP      psyllium  1 packet Oral Daily Jasmyn DARWIN Leeat, CRNP      sertraline  50 mg Oral Daily Brooke Mack MD         Counseling / Coordination of Care: Total floor / unit time spent today 15 minutes  Greater than 50% of total time was spent with the patient and / or family counseling and / or somewhat receptive to supportive listening and teaching positive coping skills to deal with symptom mangement  Patient's Rights, confidentiality and exceptions to confidentiality, use of automated medical record, [facility] staff access to medical record, and consent to treatment reviewed  This note was  not shared with the patient because it might further aggravate her psychiatric condition  This note has been dictated

## 2021-06-09 PROCEDURE — 99232 SBSQ HOSP IP/OBS MODERATE 35: CPT | Performed by: PSYCHIATRY & NEUROLOGY

## 2021-06-09 RX ADMIN — GABAPENTIN 600 MG: 300 CAPSULE ORAL at 09:48

## 2021-06-09 RX ADMIN — GABAPENTIN 600 MG: 300 CAPSULE ORAL at 21:06

## 2021-06-09 RX ADMIN — OLANZAPINE 10 MG: 10 TABLET, FILM COATED ORAL at 21:07

## 2021-06-09 RX ADMIN — LORAZEPAM 0.5 MG: 0.5 TABLET ORAL at 21:06

## 2021-06-09 RX ADMIN — HALOPERIDOL 5 MG: 5 TABLET ORAL at 10:46

## 2021-06-09 RX ADMIN — PHENYTOIN 1 MG: 125 SUSPENSION ORAL at 21:07

## 2021-06-09 RX ADMIN — NICOTINE 1 PATCH: 21 PATCH, EXTENDED RELEASE TRANSDERMAL at 09:49

## 2021-06-09 RX ADMIN — LORAZEPAM 0.5 MG: 0.5 TABLET ORAL at 11:50

## 2021-06-09 RX ADMIN — PSYLLIUM HUSK 1 PACKET: 3.4 POWDER ORAL at 09:47

## 2021-06-09 RX ADMIN — DIPHENHYDRAMINE HCL 25 MG: 25 TABLET ORAL at 21:07

## 2021-06-09 RX ADMIN — DICLOFENAC SODIUM 2 G: 10 GEL TOPICAL at 11:17

## 2021-06-09 RX ADMIN — SERTRALINE HYDROCHLORIDE 50 MG: 50 TABLET ORAL at 09:49

## 2021-06-09 RX ADMIN — GABAPENTIN 600 MG: 300 CAPSULE ORAL at 17:09

## 2021-06-09 RX ADMIN — OLANZAPINE 15 MG: 5 TABLET, FILM COATED ORAL at 09:48

## 2021-06-09 RX ADMIN — HALOPERIDOL 1 MG: 1 TABLET ORAL at 21:07

## 2021-06-09 RX ADMIN — PANTOPRAZOLE SODIUM 40 MG: 40 TABLET, DELAYED RELEASE ORAL at 06:03

## 2021-06-09 RX ADMIN — GABAPENTIN 600 MG: 300 CAPSULE ORAL at 11:50

## 2021-06-09 RX ADMIN — BENZTROPINE MESYLATE 1 MG: 1 TABLET ORAL at 17:10

## 2021-06-09 RX ADMIN — HALOPERIDOL 1 MG: 1 TABLET ORAL at 09:48

## 2021-06-09 RX ADMIN — LORAZEPAM 0.5 MG: 0.5 TABLET ORAL at 17:09

## 2021-06-09 RX ADMIN — BENZTROPINE MESYLATE 1 MG: 1 TABLET ORAL at 09:48

## 2021-06-09 RX ADMIN — FENOFIBRATE 145 MG: 145 TABLET ORAL at 09:48

## 2021-06-09 RX ADMIN — LORAZEPAM 0.5 MG: 0.5 TABLET ORAL at 09:48

## 2021-06-09 RX ADMIN — LIDOCAINE 1 PATCH: 50 PATCH CUTANEOUS at 09:47

## 2021-06-09 RX ADMIN — HALOPERIDOL 1 MG: 1 TABLET ORAL at 17:09

## 2021-06-09 NOTE — NURSING NOTE
Patient spent most of the morning screaming "fuck off, get the fuck away from me, leave me the fuck alone "  Patient states she was "hypnotized and the people tormenting me are demented and my psychosis comes from them and they are making me demented "  Patient given PRN haldol, PO 5 mg

## 2021-06-09 NOTE — NURSING NOTE
Harris maintained on ongoing SAFE precaution without incident on this shift   Laying in bed with eyes closed, breath even and unlabored    Q 7 minutes rounding implemented   Behavioral control no outburst   Will continue to monitor

## 2021-06-09 NOTE — PLAN OF CARE
Problem: Nutrition/Hydration-ADULT  Goal: Nutrient/Hydration intake appropriate for improving, restoring or maintaining nutritional needs  Description: Monitor and assess patient's nutrition/hydration status for malnutrition  Collaborate with interdisciplinary team and initiate plan and interventions as ordered  Monitor patient's weight and dietary intake as ordered or per policy  Utilize nutrition screening tool and intervene as necessary  Determine patient's food preferences and provide high-protein, high-caloric foods as appropriate       INTERVENTIONS:  - Monitor oral intake, urinary output, labs, and treatment plans  - Assess nutrition and hydration status and recommend course of action  - Evaluate amount of meals eaten  - Assist patient with eating if necessary   - Allow adequate time for meals  - Recommend/ encourage appropriate diets, oral nutritional supplements, and vitamin/mineral supplements  - Order, calculate, and assess calorie counts as needed  - Recommend, monitor, and adjust tube feedings and TPN/PPN based on assessed needs  - Assess need for intravenous fluids  - Provide specific nutrition/hydration education as appropriate  - Include patient/family/caregiver in decisions related to nutrition  Outcome: Progressing     Problem: Alteration in Thoughts and Perception  Goal: Treatment Goal: Gain control of psychotic behaviors/thinking, reduce/eliminate presenting symptoms and demonstrate improved reality functioning upon discharge  Outcome: Progressing  Goal: Verbalize thoughts and feelings  Description: Interventions:  - Promote a nonjudgmental and trusting relationship with the patient through active listening and therapeutic communication  - Assess patient's level of functioning, behavior and potential for risk  - Engage patient in 1 on 1 interactions  - Encourage patient to express fears, feelings, frustrations, and discuss symptoms    - [place] patient to reality, help patient recognize reality-based thinking   - Administer medications as ordered and assess for potential side effects  - Provide the patient education related to the signs and symptoms of the illness and desired effects of prescribed medications  Outcome: Progressing  Goal: Refrain from acting on delusional thinking/internal stimuli  Description: Interventions:  - Monitor patient closely, per order   - Utilize least restrictive measures   - Set reasonable limits, give positive feedback for acceptable   - Administer medications as ordered and monitor of potential side effects  Outcome: Progressing  Goal: Agree to be compliant with medication regime, as prescribed and report medication side effects  Description: Interventions:  - Offer appropriate PRN medication and supervise ingestion; conduct AIMS, as needed   Outcome: Progressing  Goal: Attend and participate in unit activities, including therapeutic, recreational, and educational groups  Description: Interventions:  -Encourage Visitation and family involvement in care  Outcome: Progressing  Goal: Recognize dysfunctional thoughts, communicate reality-based thoughts at the time of discharge  Description: Interventions:  - Provide medication and psycho-education to assist patient in compliance and developing insight into his/her illness   Outcome: Progressing  Goal: Complete daily ADLs, including personal hygiene independently, as able  Description: Interventions:  - Observe, teach, and assist patient with ADLS  - Monitor and promote a balance of rest/activity, with adequate nutrition and elimination   Outcome: Progressing     Problem: Ineffective Coping  Goal: Cooperates with admission process  Description: Interventions:   - Complete admission process  Outcome: Progressing  Goal: Identifies ineffective coping skills  Outcome: Progressing  Goal: Identifies healthy coping skills  Outcome: Progressing  Goal: Demonstrates healthy coping skills  Outcome: Progressing  Goal: Participates in unit activities  Description: Interventions:  - Provide therapeutic environment   - Provide required programming   - Redirect inappropriate behaviors   Outcome: Progressing  Goal: Patient/Family participate in treatment and DC plans  Description: Interventions:  - Provide therapeutic environment  Outcome: Progressing  Goal: Patient/Family verbalizes awareness of resources  Outcome: Progressing  Goal: Understands least restrictive measures  Description: Interventions:  - Utilize least restrictive behavior  Outcome: Progressing  Goal: Free from restraint events  Description: - Utilize least restrictive measures   - Provide behavioral interventions   - Redirect inappropriate behaviors   Outcome: Progressing     Problem: Risk for Self Injury/Neglect  Goal: Treatment Goal: Remain safe during length of stay, learn and adopt new coping skills, and be free of self-injurious ideation, impulses and acts at the time of discharge  Outcome: Progressing  Goal: Verbalize thoughts and feelings  Description: Interventions:  - Assess and re-assess patient's lethality and potential for self-injury  - Engage patient in 1:1 interactions, daily, for a minimum of 15 minutes  - Encourage patient to express feelings, fears, frustrations, hopes  - Establish rapport/trust with patient   Outcome: Progressing  Goal: Refrain from harming self  Description: Interventions:  - Monitor patient closely, per order  - Develop a trusting relationship  - Supervise medication ingestion, monitor effects and side effects   Outcome: Progressing  Goal: Attend and participate in unit activities, including therapeutic, recreational, and educational groups  Description: Interventions:  - Provide therapeutic and educational activities daily, encourage attendance and participation, and document same in the medical record  - Obtain collateral information, encourage visitation and family involvement in care   Outcome: Progressing  Goal: Recognize maladaptive responses and adopt new coping mechanisms  Outcome: Progressing  Goal: Complete daily ADLs, including personal hygiene independently, as able  Description: Interventions:  - Observe, teach, and assist patient with ADLS  - Monitor and promote a balance of rest/activity, with adequate nutrition and elimination  Outcome: Progressing     Problem: Depression  Goal: Treatment Goal: Demonstrate behavioral control of depressive symptoms, verbalize feelings of improved mood/affect, and adopt new coping skills prior to discharge  Outcome: Progressing  Goal: Verbalize thoughts and feelings  Description: Interventions:  - Assess and re-assess patient's level of risk   - Engage patient in 1:1 interactions, daily, for a minimum of 15 minutes   - Encourage patient to express feelings, fears, frustrations, hopes   Outcome: Progressing  Goal: Refrain from harming self  Description: Interventions:  - Monitor patient closely, per order   - Supervise medication ingestion, monitor effects and side effects   Outcome: Progressing  Goal: Refrain from isolation  Description: Interventions:  - Develop a trusting relationship   - Encourage socialization   Outcome: Progressing  Goal: Refrain from self-neglect  Outcome: Progressing  Goal: Attend and participate in unit activities, including therapeutic, recreational, and educational groups  Description: Interventions:  - Provide therapeutic and educational activities daily, encourage attendance and participation, and document same in the medical record   Outcome: Progressing  Goal: Complete daily ADLs, including personal hygiene independently, as able  Description: Interventions:  - Observe, teach, and assist patient with ADLS  -  Monitor and promote a balance of rest/activity, with adequate nutrition and elimination   Outcome: Progressing     Problem: Anxiety  Goal: Anxiety is at manageable level  Description: Interventions:  - Assess and monitor patient's anxiety level     - Monitor for signs and symptoms (heart palpitations, chest pain, shortness of breath, headaches, nausea, feeling jumpy, restlessness, irritable, apprehensive)  - Collaborate with interdisciplinary team and initiate plan and interventions as ordered    - [place] patient to unit/surroundings  - Explain treatment plan  - Encourage participation in care  - Encourage verbalization of concerns/fears  - Identify coping mechanisms  - Assist in developing anxiety-reducing skills  - Administer/offer alternative therapies  - Limit or eliminate stimulants  Outcome: Progressing     Problem: Risk for Violence/Aggression Toward Others  Goal: Treatment Goal: Refrain from acts of violence/aggression during length of stay, and demonstrate improved impulse control at the time of discharge  Outcome: Progressing  Goal: Verbalize thoughts and feelings  Description: Interventions:  - Assess and re-assess patient's level of risk, every waking shift  - Engage patient in 1:1 interactions, daily, for a minimum of 15 minutes   - Allow patient to express feelings and frustrations in a safe and non-threatening manner   - Establish rapport/trust with patient   Outcome: Progressing  Goal: Refrain from harming others  Outcome: Progressing  Goal: Refrain from destructive acts on the environment or property  Outcome: Progressing  Goal: Control angry outbursts  Description: Interventions:  - Monitor patient closely, per order  - Ensure early verbal de-escalation  - Monitor prn medication needs  - Set reasonable/therapeutic limits, outline behavioral expectations, and consequences   - Provide a non-threatening milieu, utilizing the least restrictive interventions   Outcome: Progressing  Goal: Attend and participate in unit activities, including therapeutic, recreational, and educational groups  Description: Interventions:  - Provide therapeutic and educational activities daily, encourage attendance and participation, and document same in the medical record   Outcome: Progressing  Goal: Identify appropriate positive anger management techniques  Description: Interventions:  - Offer anger management and coping skills groups   - Staff will provide positive feedback for appropriate anger control  Outcome: Progressing     Problem: Alteration in Orientation  Goal: Treatment Goal: Demonstrate a reduction of confusion and improved orientation to person, place, time and/or situation upon discharge, according to optimum baseline/ability  Outcome: Progressing  Goal: Interact with staff daily  Description: Interventions:  - Assess and re-assess patient's level of orientation  - Engage patient in 1 on 1 interactions, daily, for a minimum of 15 minutes   - Establish rapport/trust with patient   Outcome: Progressing  Goal: Express concerns related to confused thinking related to:  Description: Interventions:  - Encourage patient to express feelings, fears, frustrations, hopes  - Assign consistent caregivers   - [place]/re-orient patient as needed  - Allow comfort items, as appropriate  - Provide visual cues, signs, etc    Outcome: Progressing  Goal: Allow medical examinations, as recommended  Description: Interventions:  - Provide physical/neurological exams and/or referrals, per provider   Outcome: Progressing  Goal: Cooperate with recommended testing/procedures  Description: Interventions:  - Determine need for ancillary testing  - Observe for mental status changes  - Implement falls/precaution protocol   Outcome: Progressing  Goal: Attend and participate in unit activities, including therapeutic, recreational, and educational groups  Description: Interventions:  - Provide therapeutic and educational activities daily, encourage attendance and participation, and document same in the medical record   - Provide appropriate opportunities for reminiscence   - Provide a consistent daily routine   - Encourage family contact/visitation   Outcome: Progressing  Goal: Complete daily ADLs, including personal hygiene independently, as able  Description: Interventions:  - Observe, teach, and assist patient with ADLS  Outcome: Progressing     Problem: Individualized Interventions  Goal: Patient will verbalize appropriate use of telephone within 5 days  Description: Interventions:  - Treatment team to determine use of supervised phone privileges   Outcome: Progressing  Goal: Patient will verbalize need for hospitalization and will no longer attempt elopement within 5 days  Description: Interventions:  - Ongoing education to help patient understand need for hospitalization  Outcome: Progressing  Goal: Patient will recognize inappropriate behaviors and develop alternative behaviors within 5 days  Description: Interventions:  - Patient in collaboration with Treatment Team will develop a behavior management plan to help identify effective coping skills to deal with stressors  Outcome: Progressing     Problem: DISCHARGE PLANNING - CARE MANAGEMENT  Goal: Discharge to post-acute care or home with appropriate resources  Description: INTERVENTIONS:  - Conduct assessment to determine patient/family and health care team treatment goals, and need for post-acute services based on payer coverage, community resources, and patient preferences, and barriers to discharge  - Address psychosocial, clinical, and financial barriers to discharge as identified in assessment in conjunction with the patient/family and health care team  - Arrange appropriate level of post-acute services according to patients   needs and preference and payer coverage in collaboration with the physician and health care team  - Communicate with and update the patient/family, physician, and health care team regarding progress on the discharge plan  - Arrange appropriate transportation to post-acute venues  Outcome: Progressing

## 2021-06-09 NOTE — PROGRESS NOTES
Psychiatry Progress Note Paulie Hill [age] y o  female MRN: [id number]  Unit/Bed#: [facility] 985-35 Encounter: [id number]  Code Status: Level 1 - Full Code    PCP: Cali January, DO    Date of Admission:  4/7/2021 1435   Date of Service:  06/09/21    Patient Active Problem List   Diagnosis    Schizoaffective disorder, bipolar type ([email or web address] 75 )    Uncomplicated alcohol dependence ([email or web address] 75 )    Suicidal behavior    LYNN (generalized anxiety disorder)    Slow transit constipation    Deficiency of vitamin B    Degenerative disc disease, lumbar    Post-traumatic stress disorder, chronic    Lower extremity weakness    Generalized abdominal pain    Non-intractable vomiting    Medical clearance for psychiatric admission    Carbuncle    Alcohol dependence with unspecified alcohol-induced disorder ([email or web address] 75 )    Mild intermittent asthma without complication    Tobacco abuse    Ear problem, bilateral    Gastroesophageal reflux disease    Right foot pain    Ear problem, right     Review of systems:        Unremarkable today but voltaren for foot pain  Diagnosis:               Schizoaffective bipolar, alcohol use disorder in early remission in controlled  environment  Assessment   Overall Status:        Was another episode of yelling claiming that someone who had followed her to the hospital is living upstairs and is hypnotizing her and she can hear her through the vents and asked for ativan but given atrax instead yesterday evening for yellin   Certification Statement: The patient will continue to require additional inpatient hospital stay due to ongoing mood swings paranoia   Acceptance by patient:  accepting    Hopefulness in recovery:  Living in a group home   Understanding of medications: has good understanding   Involved in reintegration process:  Adjusting to the unit   Trusting in relationship with psychiatrist:  trusting   Justification for dual anti-psychotics: due to lack of response to single antipsychotics   Side effects from treatment: none    Medication changes    none today  Medication Education; Risks and S/E and precautions of all meds given to patient and she did verbalize an understanding   Non-pharmacological treatments   Continue with individual, group, milieu and occupational therapy using recovery principles and psycho-education about accepting illness and the need for treatment  Safety   Safety and communication plan established to target dynamic risk factors discussed above  Discharge Plan     most likely to a group home with the act team again     Interval Progress        Patient is again yelling needing p r n  hydroxyzine around 10:00 a m  in the morning and was given hydroxyzine which she  Seem to help with her anxiety and yelling  She claims she was he hearing a man who reportedly followed her all the way to the hospital and is living upstairs  andthat she can hear him through the vents! Otherwise fairly well groomed not able to practice positive coping skills seeking p r n  medications right away claiming to have psychotic breaks  No aggressive threatening self-abusive or destructive behaviors reported and has been attending more groups     Sleep:                good   appetite:               good   compliance with medications :        compliant   Side  Effects:                        None reported today   significant events:                 Still delusional and screaming   group attendance:            All attending most of the groups    Mental Status Exam  Appearance: age appropriate, casually dressed, looks older than stated age, underweight    Friendly pleasant  Found in the hallway related well fairly groomed  Behavior: normal, pleasant, cooperative, mildly anxious,  more friendly today and not confrontational     Speech: fluent, clear, coherent, increased rate, hypertalkative,   Circumstantial pressured    Mood: depressed, anxious, euphoric  Affect: labile, reactive, slightly brighter  Thought Process: normal abstract reasoning, less prominent, tends to be pressured and circumstantial and preoccupied perseverating on discharge    Thought Content: some paranoia, ideas of reference, but does appear as if paranoid  No current suicidal homicidal thoughts intent or plans verbalized  No phobias obsessions compulsions or distorted body perceptions elicited  Also believes TV is sending her messages from movie stars checking her if she is good or bad and threatening to get the feds to go after her  Perceptual Disturbances: no auditory hallucinations, no visual hallucinations, denies auditory hallucinations when asked, does not appear responding to internal stimuli, no voices reported today       Hx Risk Factors: chronic mood disorder, chronic psychotic symptoms, alcohol use, limted insight  Sensorium:    Oriented to 3 spheres and to situation  Cognition: recent and remote memory grossly intact  Consciousness: alert and awake  Attention: attention span and concentration are age appropriate  Intellect: appears to be of average intelligence  Insight: improving  Judgement: improving  Motor Activity: no abnormal movements     Vitals  Temp:  [97 6 °F (36 4 °C)] 97 6 °F (36 4 °C)  HR:  [92-97] 97  Resp:  [18] 18  BP: (105-114)/(55-73) 105/55  No intake or output data in the 24 hours ending 06/09/21 7300    Lab Results:  [facility] Admission Reviewed     Current Facility-Administered Medications   Medication Dose Route Frequency Provider Last Rate    acetaminophen  650 mg Oral Q6H PRN Andrei Lama, RENATANP      acetaminophen  650 mg Oral Q4H PRN JARED Timmons      acetaminophen  975 mg Oral Q6H PRN JARED Jones      al mag oxide-diphenhydramine-lidocaine viscous  10 mL Swish & Swallow Q4H PRN JARED Lockwood      albuterol  2 puff Inhalation Q6H PRN JARED Jones      aluminum-magnesium hydroxide-simethicone 15 mL Oral Q4H PRN Jasmyn Pelletiererrat, CRNP      benztropine  1 mg Intramuscular Q4H PRN Max 6/day Isis Lama, CRNP      benztropine  1 mg Oral Q4H PRN Max 6/day Isis Lama, CRNP      benztropine  1 mg Oral BID Evelin Avila MD      calcium carbonate  500 mg Oral TID PRN Hero Lama, CRNP      Diclofenac Sodium  2 g Topical 4x Daily PRN Chiquita Treadwell PA-C      diphenhydrAMINE  25 mg Oral HS Isis Lama, CRNP      fenofibrate  145 mg Oral Daily Isis Lama, CRNP      gabapentin  600 mg Oral 4x Daily Isis Lama, CRNP      haloperidol  1 mg Oral 4x Daily Carrie Barrera MD      haloperidol  5 mg Oral Q6H PRN Hero Lama, CRNP      hydrOXYzine HCL  25 mg Oral Q6H PRN Max 4/day Isis Lama, CRNP      hydrOXYzine HCL  50 mg Oral Q6H PRN Max 4/day Isis Lama, CRNP      lidocaine  1 patch Topical Daily Isis Lama, CRNP      LORazepam  1 mg Intramuscular Q6H PRN Carrie Barrera MD      LORazepam  0 5 mg Oral 4x Daily Carrie Barrera MD      magnesium hydroxide  30 mL Oral Daily PRN Hero Nehemias Lama, CRNP      methocarbamol  500 mg Oral Q8H PRN Carrie Barrera MD      nicotine  1 patch Transdermal Daily Dakota Qureshi, CRKALLIE      nicotine polacrilex  4 mg Oral Q2H PRN Iglesia Almeida, JARED      OLANZapine  2 5 mg Oral Q8H PRN Dakota Qureshi, CRNP      OLANZapine  5 mg Oral Q3H PRN Dakota Qureshi, CRNP      OLANZapine  7 5 mg Oral Q3H PRN Dakota Qureshi, CRNP      OLANZapine  10 mg Intramuscular Q3H PRN Dakota Qureshi, CRKALLIE      OLANZapine  5 mg Intramuscular Q3H PRN Dakota Qureshi, CRKALLIE      OLANZapine  10 mg Oral HS Camilo Carrillo MD      OLANZapine  15 mg Oral Daily Camilo Carrillo MD      paliperidone palmitate ER  234 mg Intramuscular Q30 Days Carrie Barrera MD      pantoprazole  40 mg Oral Early Morning Iglesia Almeida, JARED      Pramox-PE-Glycerin-Petrolatum  1 application Rectal 4x Daily PRN JARED Billings  prazosin  1 mg Oral HS JARED Faye      psyllium  1 packet Oral Daily JARED Isbell      sertraline  50 mg Oral Daily Onelia Loredo MD         Counseling / Coordination of Care: Total floor / unit time spent today 15 minutes  Greater than 50% of total time was spent with the patient and / or family counseling and / or somewhat receptive to supportive listening and teaching positive coping skills to deal with symptom mangement  Patient's Rights, confidentiality and exceptions to confidentiality, use of automated medical record, [address] [place] [address] staff access to medical record, and consent to treatment reviewed  This note was  not shared with the patient because it might further aggravate her psychiatric condition  This note has been dictated

## 2021-06-09 NOTE — SOCIAL WORK
Summary    SW met with pt for her individual therapy session  Pt has been more on edge, less bright, and has been overheard by SW yelling out a few times this week  It was a good time to check in with some of these observations made, as she had gone a few weeks with symptom improvement and has appeared to regress slightly  Pt and SW met for extensive time discussing her fear of "losing her mind" as she has shared of before  Pt reported that since a few med changes she has slowly started to feel more anxious and psychotic  Pt explained that she is very fearful of never feeling good again  She asked for SW to inquire with psychiatrist about possible medication changes to help her feel better  Time spent discussing this, as well as coping strategies she has not heard of or tried before  Pt was receptive to this  She also spent time showing SW one of her journals that she gets creative with and does daily  Pt clearly puts a lot of effort and time into her journaling activities, (she has several) "I have like five projects going on at once "     On and off, pt appeared distracted and as though she was responding / trying to not respond to internal stimuli  Pt went into detail about what she experiences with the hypnosis and the "people" who feed her lies and negativity  Pt struggles to clearly communicate her experiences with this, though is very clear about how much distress it causes her  Overall, the session was positive and productive on many levels  Pt at one point was fixated on discharge and asked SW if her struggles with hypnosis and her recent regression will prevent her from discharge  "I'm ready  Brenda Tisha discharge]    When I get out there, I can go for a walk, smoke a cigarette, do things that help  I'm trapped here "    We will follow up on her journaling assignment and the coping strategies discussed

## 2021-06-09 NOTE — PLAN OF CARE
Problem: Alteration in Thoughts and Perception  Goal: Treatment Goal: Gain control of psychotic behaviors/thinking, reduce/eliminate presenting symptoms and demonstrate improved reality functioning upon discharge  Outcome: Not Progressing  Goal: Verbalize thoughts and feelings  Description: Interventions:  - Promote a nonjudgmental and trusting relationship with the patient through active listening and therapeutic communication  - Assess patient's level of functioning, behavior and potential for risk  - Engage patient in 1 on 1 interactions  - Encourage patient to express fears, feelings, frustrations, and discuss symptoms    - [place] patient to reality, help patient recognize reality-based thinking   - Administer medications as ordered and assess for potential side effects  - Provide the patient education related to the signs and symptoms of the illness and desired effects of prescribed medications  Outcome: Progressing  Goal: Refrain from acting on delusional thinking/internal stimuli  Description: Interventions:  - Monitor patient closely, per order   - Utilize least restrictive measures   - Set reasonable limits, give positive feedback for acceptable   - Administer medications as ordered and monitor of potential side effects  Outcome: Not Progressing  Goal: Agree to be compliant with medication regime, as prescribed and report medication side effects  Description: Interventions:  - Offer appropriate PRN medication and supervise ingestion; conduct AIMS, as needed   Outcome: Progressing  Goal: Attend and participate in unit activities, including therapeutic, recreational, and educational groups  Description: Interventions:  -Encourage Visitation and family involvement in care  Outcome: Progressing  Goal: Recognize dysfunctional thoughts, communicate reality-based thoughts at the time of discharge  Description: Interventions:  - Provide medication and psycho-education to assist patient in compliance and developing insight into his/her illness   Outcome: Not Progressing  Goal: Complete daily ADLs, including personal hygiene independently, as able  Description: Interventions:  - Observe, teach, and assist patient with ADLS  - Monitor and promote a balance of rest/activity, with adequate nutrition and elimination   Outcome: Progressing     Problem: Ineffective Coping  Goal: Cooperates with admission process  Description: Interventions:   - Complete admission process  Outcome: Progressing  Goal: Identifies ineffective coping skills  Outcome: Progressing  Goal: Identifies healthy coping skills  Outcome: Progressing  Goal: Demonstrates healthy coping skills  Outcome: Progressing  Goal: Participates in unit activities  Description: Interventions:  - Provide therapeutic environment   - Provide required programming   - Redirect inappropriate behaviors   Outcome: Progressing  Goal: Patient/Family participate in treatment and DC plans  Description: Interventions:  - Provide therapeutic environment  Outcome: Progressing  Goal: Patient/Family verbalizes awareness of resources  Outcome: Progressing  Goal: Understands least restrictive measures  Description: Interventions:  - Utilize least restrictive behavior  Outcome: Progressing  Goal: Free from restraint events  Description: - Utilize least restrictive measures   - Provide behavioral interventions   - Redirect inappropriate behaviors   Outcome: Progressing     Problem: Risk for Self Injury/Neglect  Goal: Treatment Goal: Remain safe during length of stay, learn and adopt new coping skills, and be free of self-injurious ideation, impulses and acts at the time of discharge  Outcome: Progressing  Goal: Verbalize thoughts and feelings  Description: Interventions:  - Assess and re-assess patient's lethality and potential for self-injury  - Engage patient in 1:1 interactions, daily, for a minimum of 15 minutes  - Encourage patient to express feelings, fears, frustrations, hopes  - Establish rapport/trust with patient   Outcome: Progressing  Goal: Refrain from harming self  Description: Interventions:  - Monitor patient closely, per order  - Develop a trusting relationship  - Supervise medication ingestion, monitor effects and side effects   Outcome: Progressing  Goal: Attend and participate in unit activities, including therapeutic, recreational, and educational groups  Description: Interventions:  - Provide therapeutic and educational activities daily, encourage attendance and participation, and document same in the medical record  - Obtain collateral information, encourage visitation and family involvement in care   Outcome: Progressing  Goal: Recognize maladaptive responses and adopt new coping mechanisms  Outcome: Not Progressing  Goal: Complete daily ADLs, including personal hygiene independently, as able  Description: Interventions:  - Observe, teach, and assist patient with ADLS  - Monitor and promote a balance of rest/activity, with adequate nutrition and elimination  Outcome: Progressing     Problem: Depression  Goal: Treatment Goal: Demonstrate behavioral control of depressive symptoms, verbalize feelings of improved mood/affect, and adopt new coping skills prior to discharge  Outcome: Progressing  Goal: Verbalize thoughts and feelings  Description: Interventions:  - Assess and re-assess patient's level of risk   - Engage patient in 1:1 interactions, daily, for a minimum of 15 minutes   - Encourage patient to express feelings, fears, frustrations, hopes   Outcome: Progressing  Goal: Refrain from harming self  Description: Interventions:  - Monitor patient closely, per order   - Supervise medication ingestion, monitor effects and side effects   Outcome: Progressing  Goal: Refrain from isolation  Description: Interventions:  - Develop a trusting relationship   - Encourage socialization   Outcome: Progressing  Goal: Refrain from self-neglect  Outcome: Progressing  Goal: Attend and participate in unit activities, including therapeutic, recreational, and educational groups  Description: Interventions:  - Provide therapeutic and educational activities daily, encourage attendance and participation, and document same in the medical record   Outcome: Progressing  Goal: Complete daily ADLs, including personal hygiene independently, as able  Description: Interventions:  - Observe, teach, and assist patient with ADLS  -  Monitor and promote a balance of rest/activity, with adequate nutrition and elimination   Outcome: Progressing     Problem: Anxiety  Goal: Anxiety is at manageable level  Description: Interventions:  - Assess and monitor patient's anxiety level  - Monitor for signs and symptoms (heart palpitations, chest pain, shortness of breath, headaches, nausea, feeling jumpy, restlessness, irritable, apprehensive)  - Collaborate with interdisciplinary team and initiate plan and interventions as ordered    - [place] patient to unit/surroundings  - Explain treatment plan  - Encourage participation in care  - Encourage verbalization of concerns/fears  - Identify coping mechanisms  - Assist in developing anxiety-reducing skills  - Administer/offer alternative therapies  - Limit or eliminate stimulants  Outcome: Not Progressing     Problem: Risk for Violence/Aggression Toward Others  Goal: Treatment Goal: Refrain from acts of violence/aggression during length of stay, and demonstrate improved impulse control at the time of discharge  Outcome: Progressing  Goal: Verbalize thoughts and feelings  Description: Interventions:  - Assess and re-assess patient's level of risk, every waking shift  - Engage patient in 1:1 interactions, daily, for a minimum of 15 minutes   - Allow patient to express feelings and frustrations in a safe and non-threatening manner   - Establish rapport/trust with patient   Outcome: Progressing  Goal: Refrain from harming others  Outcome: Progressing  Goal: Refrain from destructive acts on the environment or property  Outcome: Progressing  Goal: Control angry outbursts  Description: Interventions:  - Monitor patient closely, per order  - Ensure early verbal de-escalation  - Monitor prn medication needs  - Set reasonable/therapeutic limits, outline behavioral expectations, and consequences   - Provide a non-threatening milieu, utilizing the least restrictive interventions   Outcome: Progressing  Goal: Attend and participate in unit activities, including therapeutic, recreational, and educational groups  Description: Interventions:  - Provide therapeutic and educational activities daily, encourage attendance and participation, and document same in the medical record   Outcome: Progressing  Goal: Identify appropriate positive anger management techniques  Description: Interventions:  - Offer anger management and coping skills groups   - Staff will provide positive feedback for appropriate anger control  Outcome: Progressing     Problem: Alteration in Orientation  Goal: Treatment Goal: Demonstrate a reduction of confusion and improved orientation to person, place, time and/or situation upon discharge, according to optimum baseline/ability  Outcome: Progressing  Goal: Interact with staff daily  Description: Interventions:  - Assess and re-assess patient's level of orientation  - Engage patient in 1 on 1 interactions, daily, for a minimum of 15 minutes   - Establish rapport/trust with patient   Outcome: Progressing  Goal: Express concerns related to confused thinking related to:  Description: Interventions:  - Encourage patient to express feelings, fears, frustrations, hopes  - Assign consistent caregivers   - [place]/re-orient patient as needed  - Allow comfort items, as appropriate  - Provide visual cues, signs, etc    Outcome: Not Progressing  Goal: Allow medical examinations, as recommended  Description: Interventions:  - Provide physical/neurological exams and/or referrals, per provider   Outcome: Progressing  Goal: Cooperate with recommended testing/procedures  Description: Interventions:  - Determine need for ancillary testing  - Observe for mental status changes  - Implement falls/precaution protocol   Outcome: Progressing  Goal: Attend and participate in unit activities, including therapeutic, recreational, and educational groups  Description: Interventions:  - Provide therapeutic and educational activities daily, encourage attendance and participation, and document same in the medical record   - Provide appropriate opportunities for reminiscence   - Provide a consistent daily routine   - Encourage family contact/visitation   Outcome: Progressing  Goal: Complete daily ADLs, including personal hygiene independently, as able  Description: Interventions:  - Observe, teach, and assist patient with ADLS  Outcome: Progressing     Problem: Individualized Interventions  Goal: Patient will verbalize appropriate use of telephone within 5 days  Description: Interventions:  - Treatment team to determine use of supervised phone privileges   Outcome: Progressing  Goal: Patient will verbalize need for hospitalization and will no longer attempt elopement within 5 days  Description: Interventions:  - Ongoing education to help patient understand need for hospitalization  Outcome: Progressing  Goal: Patient will recognize inappropriate behaviors and develop alternative behaviors within 5 days  Description: Interventions:  - Patient in collaboration with Treatment Team will develop a behavior management plan to help identify effective coping skills to deal with stressors  Outcome: Progressing

## 2021-06-09 NOTE — NURSING NOTE
Danny Menjivar has been visible, pleasant, and cooperative  Patient did not have any episodes of yelling and has remained in control  Patient social with select peers  Denies all psych symptoms  She is medication and meal compliant   Consumed 50% of dinner  Patient attended wrap up  group this shift  Continue to monitor

## 2021-06-09 NOTE — PROGRESS NOTES
06/09/21 1020   Team Meeting   Meeting Type Daily Rounds   Initial Conference Date 06/09/21   Patient/Family Present   Patient Present No   Patient's Family Present No       Daily Rounds Documentation  Team Members Participating  MD Lon Sanchez, RN  Negrita Banuelos, LSW  Kera Hernandez, W  Dorian Downing, CPS    Case reviewed  Atarax PRN, increased periods of irritability and yelling out, but not as much as when she first came to unit  Has dental appointment tomorrow at 80  5/5 groups

## 2021-06-10 PROCEDURE — 99232 SBSQ HOSP IP/OBS MODERATE 35: CPT | Performed by: PSYCHIATRY & NEUROLOGY

## 2021-06-10 RX ORDER — SERTRALINE HYDROCHLORIDE 100 MG/1
100 TABLET, FILM COATED ORAL DAILY
Status: DISCONTINUED | OUTPATIENT
Start: 2021-06-11 | End: 2021-07-29 | Stop reason: HOSPADM

## 2021-06-10 RX ADMIN — GABAPENTIN 600 MG: 300 CAPSULE ORAL at 12:06

## 2021-06-10 RX ADMIN — GABAPENTIN 600 MG: 300 CAPSULE ORAL at 17:04

## 2021-06-10 RX ADMIN — LORAZEPAM 0.5 MG: 0.5 TABLET ORAL at 17:04

## 2021-06-10 RX ADMIN — LORAZEPAM 0.5 MG: 0.5 TABLET ORAL at 08:22

## 2021-06-10 RX ADMIN — BENZTROPINE MESYLATE 1 MG: 1 TABLET ORAL at 17:04

## 2021-06-10 RX ADMIN — DIPHENHYDRAMINE HCL 25 MG: 25 TABLET ORAL at 21:13

## 2021-06-10 RX ADMIN — HALOPERIDOL 1 MG: 1 TABLET ORAL at 08:21

## 2021-06-10 RX ADMIN — OLANZAPINE 10 MG: 10 TABLET, FILM COATED ORAL at 21:13

## 2021-06-10 RX ADMIN — GABAPENTIN 600 MG: 300 CAPSULE ORAL at 08:21

## 2021-06-10 RX ADMIN — HALOPERIDOL 1 MG: 1 TABLET ORAL at 21:13

## 2021-06-10 RX ADMIN — PSYLLIUM HUSK 1 PACKET: 3.4 POWDER ORAL at 08:20

## 2021-06-10 RX ADMIN — LORAZEPAM 0.5 MG: 0.5 TABLET ORAL at 21:13

## 2021-06-10 RX ADMIN — METHOCARBAMOL TABLETS 500 MG: 500 TABLET, COATED ORAL at 14:51

## 2021-06-10 RX ADMIN — HALOPERIDOL 1 MG: 1 TABLET ORAL at 12:05

## 2021-06-10 RX ADMIN — LIDOCAINE 1 PATCH: 50 PATCH CUTANEOUS at 08:20

## 2021-06-10 RX ADMIN — BENZTROPINE MESYLATE 1 MG: 1 TABLET ORAL at 08:23

## 2021-06-10 RX ADMIN — HALOPERIDOL 1 MG: 1 TABLET ORAL at 17:04

## 2021-06-10 RX ADMIN — SERTRALINE HYDROCHLORIDE 50 MG: 50 TABLET ORAL at 08:21

## 2021-06-10 RX ADMIN — FENOFIBRATE 145 MG: 145 TABLET ORAL at 08:55

## 2021-06-10 RX ADMIN — PHENYTOIN 1 MG: 125 SUSPENSION ORAL at 21:09

## 2021-06-10 RX ADMIN — LORAZEPAM 0.5 MG: 0.5 TABLET ORAL at 12:05

## 2021-06-10 RX ADMIN — NICOTINE 1 PATCH: 21 PATCH, EXTENDED RELEASE TRANSDERMAL at 08:24

## 2021-06-10 RX ADMIN — DICLOFENAC SODIUM 2 G: 10 GEL TOPICAL at 14:52

## 2021-06-10 RX ADMIN — PANTOPRAZOLE SODIUM 40 MG: 40 TABLET, DELAYED RELEASE ORAL at 06:10

## 2021-06-10 RX ADMIN — GABAPENTIN 600 MG: 300 CAPSULE ORAL at 21:11

## 2021-06-10 RX ADMIN — OLANZAPINE 15 MG: 5 TABLET, FILM COATED ORAL at 08:22

## 2021-06-10 NOTE — NURSING NOTE
Prn robaxin given as ordered for muscle spasms, and voltaren gel as ordered  Will continue to monitor

## 2021-06-10 NOTE — PROGRESS NOTES
06/10/21 1150   Team Meeting   Meeting Type Daily Rounds   Initial Conference Date 06/10/21   Patient/Family Present   Patient Present No   Patient's Family Present No       Daily Rounds Documentation  Team Members Participating  MD Natan Rouse, RN  Sheryle Lew, [place] [facility], [id number] [facility]  Subha Graf RN    Case reviewed  Visible, pleasant  No issues  Not as much yelling in the evening yesterday  3/5 groups  Also had therapy session that patient engaged in, though appeared distracted intermittently and endorses on and off AH of voices of the "people" who are trying to hypnotize her

## 2021-06-10 NOTE — PROGRESS NOTES
Psychiatry Progress Note Paulie Hill [age] y o  female MRN: [id number]  Unit/Bed#: [facility] 481-35 Encounter: [id number]  Code Status: Level 1 - Full Code    PCP: Frank Bello DO    Date of Admission:  4/7/2021 1435   Date of Service:  06/10/21    Patient Active Problem List   Diagnosis    Schizoaffective disorder, bipolar type ([facility] )    Uncomplicated alcohol dependence ([facility] )    Suicidal behavior    LYNN (generalized anxiety disorder)    Slow transit constipation    Deficiency of vitamin B    Degenerative disc disease, lumbar    Post-traumatic stress disorder, chronic    Lower extremity weakness    Generalized abdominal pain    Non-intractable vomiting    Medical clearance for psychiatric admission    Carbuncle    Alcohol dependence with unspecified alcohol-induced disorder ([facility] )    Mild intermittent asthma without complication    Tobacco abuse    Ear problem, bilateral    Gastroesophageal reflux disease    Right foot pain    Ear problem, right     Review of systems:         Unremarkable today but did go to the dental clinic and had some bonding done between her teeth  Diagnosis:             Schizoaffective bipolar, alcohol use disorder in early remission in controlled  Environment  Assessment   Overall Status:       Was yelling in screaming yesterday cursing claiming that someone who had followed her for years he is living upstairs and is now talking to her through the vents on the ceiling and needed p r n   Haldol   Certification Statement: The patient will continue to require additional inpatient hospital stay due to ongoing mood swings paranoia   Acceptance by patient:  accepting    Hopefulness in recovery:  Living in a group home   Understanding of medications: has good understanding   Involved in reintegration process:  Adjusting to the unit   Trusting in relationship with psychiatrist:  trusting   Justification for dual anti-psychotics: due to lack of response to single antipsychotics   Side effects from treatment: none    Medication changes    increase Zoloft as 100 mg a day at her request for increased anxiety  Medication Education; Risks and S/E and precautions of all meds given to patient and she did verbalize an understanding   Non-pharmacological treatments   Continue with individual, group, milieu and occupational therapy using recovery principles and psycho-education about accepting illness and the need for treatment  Safety   Safety and communication plan established to target dynamic risk factors discussed above  Discharge Plan     most likely to a group home with the act team again     Interval Progress       Patient has been yelling in the morning cursing claiming someone who is demented is following her for years and is currently living upstairs and is speaking to her through the vents on the ceiling and needed p r n  Haldol yesterday morning  She is able to otherwise engage in a given take conversation without need refrains from any hallucinations   Still delusional about being  Hypnotized off and on  Not aggressive threatening self-abusive or destructive behaviors reported    Attending groups and is well groomed and well kept and is trying to practice positive coping skills     Sleep:                  good   appetite:              good   compliance with medications :       compliant   Side  Effects:                          None reported today   significant events:                  Still delusional and screaming at times needed p r n  yesterday   group attendance:            Attending most of the groups    Mental Status Exam  Appearance: age appropriate, casually dressed, looks older than stated age, underweight    Fairly groomed found in the hallway friendly and pleasant  Behavior: normal, pleasant, cooperative, mildly anxious,  more friendly today and not confrontational     Speech: fluent, clear, coherent, increased rate, hypertalkative,   Circumstantial pressured    Mood: depressed, anxious, euphoric  Affect: labile, reactive, slightly brighter appearing anxious  Thought Process: normal abstract reasoning, less prominent, tends to be pressured and circumstantial and preoccupied perseverating on discharge    Thought Content: some paranoia, ideas of reference, but does appear as if paranoid  No current suicidal homicidal thoughts intent or plans verbalized  No phobias obsessions compulsions or distorted body perceptions elicited  Also believes TV is sending her messages from [employer] stars checking her if she is good or bad and threatening to get the feds to go after her  Perceptual Disturbances: no auditory hallucinations, no visual hallucinations, denies auditory hallucinations when asked, does not appear responding to internal stimuli, no voices reported today       Hx Risk Factors: chronic mood disorder, chronic psychotic symptoms, alcohol use, limted insight  Sensorium:     Oriented to 3 spheres and to situation  Cognition: recent and remote memory grossly intact  Consciousness: alert and awake  Attention: attention span and concentration are age appropriate  Intellect: appears to be of average intelligence  Insight: improving  Judgement: improving  Motor Activity: no abnormal movements     Vitals  Temp:  [97 2 °F (36 2 °C)-97 5 °F (36 4 °C)] 97 2 °F (36 2 °C)  HR:  [] 100  Resp:  [18] 18  BP: (107-126)/(71-73) 107/71  No intake or output data in the 24 hours ending 06/10/21 5716    Lab Results:  [facility] Admission Reviewed     Current Facility-Administered Medications   Medication Dose Route Frequency Provider Last Rate    acetaminophen  650 mg Oral Q6H PRN Álvaro Lama, CRNP      acetaminophen  650 mg Oral Q4H PRN Álvaro Biggsradha Lama, CRNP      acetaminophen  975 mg Oral Q6H PRN JARED Jones      al mag oxide-diphenhydramine-lidocaine viscous  10 mL Swish & Swallow Q4H PRN Rachele Cordero Addie, CRNP      albuterol  2 puff Inhalation Q6H PRN Isis Porterics, CRNP      aluminum-magnesium hydroxide-simethicone  15 mL Oral Q4H PRN Jasmyn Deng, CRNP      benztropine  1 mg Intramuscular Q4H PRN Max 6/day Isis Porterics, CRNP      benztropine  1 mg Oral Q4H PRN Max 6/day Isis Porterics, CRNP      benztropine  1 mg Oral BID Pranav Fraser MD      calcium carbonate  500 mg Oral TID PRN David Cancel Lodics, CRNP      Diclofenac Sodium  2 g Topical 4x Daily PRN Marianne Meeks PA-C      diphenhydrAMINE  25 mg Oral HS Isis Porterics, CRNP      fenofibrate  145 mg Oral Daily Isis Porterics, CRNP      gabapentin  600 mg Oral 4x Daily Isis Lama, CRNP      haloperidol  1 mg Oral 4x Daily Lorna Roe MD      haloperidol  5 mg Oral Q6H PRN David Cancel Lodics, CRNP      hydrOXYzine HCL  25 mg Oral Q6H PRN Max 4/day Isis Lama, CRNP      hydrOXYzine HCL  50 mg Oral Q6H PRN Max 4/day Isis Lama, CRNP      lidocaine  1 patch Topical Daily Isis Lama, CRNP      LORazepam  1 mg Intramuscular Q6H PRN Lorna Roe MD      LORazepam  0 5 mg Oral 4x Daily Lorna Roe MD      magnesium hydroxide  30 mL Oral Daily PRN [place] Cansheng Porterics, CRNP      methocarbamol  500 mg Oral Q8H PRN Lorna Roe MD      nicotine  1 patch Transdermal Daily Wing Fields, CRNP      nicotine polacrilex  4 mg Oral Q2H PRN Biju Goncalves, CRNP      OLANZapine  2 5 mg Oral Q8H PRN Wing Fields, CRNP      OLANZapine  5 mg Oral Q3H PRN Wing Fields, CRNP      OLANZapine  7 5 mg Oral Q3H PRN Wing Fields, CRNP      OLANZapine  10 mg Intramuscular Q3H PRN Wing Fields, CRNP      OLANZapine  5 mg Intramuscular Q3H PRN Wing Fields, CRNP      OLANZapine  10 mg Oral HS Iqra Caal MD      OLANZapine  15 mg Oral Daily Slime Mcfarland MD      paliperidone palmitate ER  234 mg Intramuscular Q30 Days Lorna Roe MD      pantoprazole  40 mg Oral Early Morning Susan Knee, CRNP      Pramox-PE-Glycerin-Petrolatum  1 application Rectal 4x Daily PRN Roxie Lama, CRNP      prazosin  1 mg Oral HS Willy Torres, CRNP      psyllium  1 packet Oral Daily Jasmyn Deng, CRNP      sertraline  50 mg Oral Daily Yesica Medina MD         Counseling / Coordination of Care: Total floor / unit time spent today 15 minutes  Greater than 50% of total time was spent with the patient and / or family counseling and / or somewhat receptive to supportive listening and teaching positive coping skills to deal with symptom mangement  Patient's Rights, confidentiality and exceptions to confidentiality, use of automated medical record, [address] [place] [address] staff access to medical record, and consent to treatment reviewed  This note was  not shared with the patient because it might further aggravate her psychiatric condition  This note has been dictated

## 2021-06-10 NOTE — NURSING NOTE
Pt asleep in bed, breathing with chest movement noted  Q 7 minute checks maintained  Will continue to monitor

## 2021-06-10 NOTE — PLAN OF CARE
Problem: Alteration in Thoughts and Perception  Goal: Verbalize thoughts and feelings  Description: Interventions:  - Promote a nonjudgmental and trusting relationship with the patient through active listening and therapeutic communication  - Assess patient's level of functioning, behavior and potential for risk  - Engage patient in 1 on 1 interactions  - Encourage patient to express fears, feelings, frustrations, and discuss symptoms    - [place] patient to reality, help patient recognize reality-based thinking   - Administer medications as ordered and assess for potential side effects  - Provide the patient education related to the signs and symptoms of the illness and desired effects of prescribed medications  Outcome: Progressing  Goal: Agree to be compliant with medication regime, as prescribed and report medication side effects  Description: Interventions:  - Offer appropriate PRN medication and supervise ingestion; conduct AIMS, as needed   Outcome: Progressing  Goal: Complete daily ADLs, including personal hygiene independently, as able  Description: Interventions:  - Observe, teach, and assist patient with ADLS  - Monitor and promote a balance of rest/activity, with adequate nutrition and elimination   Outcome: Progressing

## 2021-06-10 NOTE — NURSING NOTE
Pt resting comfortably at this time  Remains on Q 7mins checks  Maintained on safe precautions without incident  Will continue to monitor behavior

## 2021-06-10 NOTE — PLAN OF CARE
Problem: Alteration in Thoughts and Perception  Goal: Verbalize thoughts and feelings  Description: Interventions:  - Promote a nonjudgmental and trusting relationship with the patient through active listening and therapeutic communication  - Assess patient's level of functioning, behavior and potential for risk  - Engage patient in 1 on 1 interactions  - Encourage patient to express fears, feelings, frustrations, and discuss symptoms    - [place] patient to reality, help patient recognize reality-based thinking   - Administer medications as ordered and assess for potential side effects  - Provide the patient education related to the signs and symptoms of the illness and desired effects of prescribed medications  Outcome: Progressing     Problem: Ineffective Coping  Goal: Participates in unit activities  Description: Interventions:  - Provide therapeutic environment   - Provide required programming   - Redirect inappropriate behaviors   Outcome: Progressing      Patient completed Goal Crad for the week of 6/7/21    Goals: Attend meetings              One on one meetings with Therapist               Meditate and pray

## 2021-06-10 NOTE — NURSING NOTE
Returned from dentis appt @ 1041  Alert, cooperative and visible intermittently  No SI  Pt noted talking to self wihile in room  Denies depression, anxiety and pain  Attended IMR and fresh air group  Consumed 100% of breakfast and 75% of lunch  Took all medication without prompting  Maintained on safe precautions without incident   Will continue to monitor progress and recovery program

## 2021-06-10 NOTE — NURSING NOTE
Sabrina Mcmanus has been visible, pleasant, and cooperative  Patient did not have any episodes of yelling and has remained in control  Patient social with select peers  Denies all psych symptoms  She is medication and meal compliant   Consumed 100% of dinner  Patient attended recovery and wrap up  group this shift  Continue to monitor

## 2021-06-11 PROCEDURE — 99232 SBSQ HOSP IP/OBS MODERATE 35: CPT | Performed by: PSYCHIATRY & NEUROLOGY

## 2021-06-11 RX ADMIN — OLANZAPINE 15 MG: 5 TABLET, FILM COATED ORAL at 08:20

## 2021-06-11 RX ADMIN — LORAZEPAM 0.5 MG: 0.5 TABLET ORAL at 11:15

## 2021-06-11 RX ADMIN — PSYLLIUM HUSK 1 PACKET: 3.4 POWDER ORAL at 08:22

## 2021-06-11 RX ADMIN — LORAZEPAM 0.5 MG: 0.5 TABLET ORAL at 17:35

## 2021-06-11 RX ADMIN — DIPHENHYDRAMINE HCL 25 MG: 25 TABLET ORAL at 21:27

## 2021-06-11 RX ADMIN — GABAPENTIN 600 MG: 300 CAPSULE ORAL at 17:35

## 2021-06-11 RX ADMIN — GABAPENTIN 600 MG: 300 CAPSULE ORAL at 21:27

## 2021-06-11 RX ADMIN — DICLOFENAC SODIUM 2 G: 10 GEL TOPICAL at 14:19

## 2021-06-11 RX ADMIN — LORAZEPAM 0.5 MG: 0.5 TABLET ORAL at 21:26

## 2021-06-11 RX ADMIN — HALOPERIDOL 1 MG: 1 TABLET ORAL at 08:20

## 2021-06-11 RX ADMIN — DICLOFENAC SODIUM 2 G: 10 GEL TOPICAL at 08:15

## 2021-06-11 RX ADMIN — METHOCARBAMOL TABLETS 500 MG: 500 TABLET, COATED ORAL at 15:13

## 2021-06-11 RX ADMIN — SERTRALINE HYDROCHLORIDE 100 MG: 100 TABLET ORAL at 08:21

## 2021-06-11 RX ADMIN — HALOPERIDOL 1 MG: 1 TABLET ORAL at 11:15

## 2021-06-11 RX ADMIN — FENOFIBRATE 145 MG: 145 TABLET ORAL at 08:20

## 2021-06-11 RX ADMIN — GABAPENTIN 600 MG: 300 CAPSULE ORAL at 11:15

## 2021-06-11 RX ADMIN — GABAPENTIN 600 MG: 300 CAPSULE ORAL at 08:20

## 2021-06-11 RX ADMIN — BENZTROPINE MESYLATE 1 MG: 1 TABLET ORAL at 08:20

## 2021-06-11 RX ADMIN — PANTOPRAZOLE SODIUM 40 MG: 40 TABLET, DELAYED RELEASE ORAL at 06:04

## 2021-06-11 RX ADMIN — LORAZEPAM 0.5 MG: 0.5 TABLET ORAL at 08:21

## 2021-06-11 RX ADMIN — HALOPERIDOL 1 MG: 1 TABLET ORAL at 17:35

## 2021-06-11 RX ADMIN — LIDOCAINE 1 PATCH: 50 PATCH CUTANEOUS at 08:22

## 2021-06-11 RX ADMIN — OLANZAPINE 10 MG: 10 TABLET, FILM COATED ORAL at 21:26

## 2021-06-11 RX ADMIN — HALOPERIDOL 1 MG: 1 TABLET ORAL at 21:27

## 2021-06-11 RX ADMIN — BENZTROPINE MESYLATE 1 MG: 1 TABLET ORAL at 17:36

## 2021-06-11 RX ADMIN — NICOTINE 1 PATCH: 21 PATCH, EXTENDED RELEASE TRANSDERMAL at 08:22

## 2021-06-11 NOTE — NURSING NOTE
Patient became severally agitated walking out of the group which was  doing guided imaginary  Patient came to the nurses station yelling that she needs medication right now because she cannot take it anymore what we are doing to her  Because it was after 11a m  nurse gave her 12 o'clock  dose of medications  Nurse suggested to the patient that she also can give her PRN's if the medications not work later  Patient stated she will go lay down  Patient didn't want to engage in conversation

## 2021-06-11 NOTE — NURSING NOTE
Patient was screaming in her room stating she is in severe bilateral foot pain  PRN Tylenol offered  When nurse brought patient Tylenol she started yelling that she doesn't want tylenol, she wants robaxin  PRN Robaxin given 1513  Was effective

## 2021-06-11 NOTE — PROGRESS NOTES
06/11/21 0948   Team Meeting   Meeting Type Daily Rounds   Initial Conference Date 06/11/21   Patient/Family Present   Patient Present No   Patient's Family Present No       Daily Rounds Documentation  Team Members Participating  MD Gurpreet Barrios [facility]  Emmalene Krabbe [place] [address], [address] [place] [address]  Yves Leung RN    Case reviewed  Successful dental appointment yesterday  No PRNS or yelling, other than one very brief outburst  Less yelling overall  Slept all night  Robaxin at 1553  3/4 groups

## 2021-06-11 NOTE — PROGRESS NOTES
Psychiatry Progress Note Paulie Hill [age] y o  female MRN: [id number]  Unit/Bed#: [facility] 991-41 Encounter: [id number]  Code Status: Level 1 - Full Code    PCP: Lefty Birch DO    Date of Admission:  4/7/2021 1435   Date of Service:  06/11/21    Patient Active Problem List   Diagnosis    Schizoaffective disorder, bipolar type ([facility] )    Uncomplicated alcohol dependence ([facility] )    Suicidal behavior    LYNN (generalized anxiety disorder)    Slow transit constipation    Deficiency of vitamin B    Degenerative disc disease, lumbar    Post-traumatic stress disorder, chronic    Lower extremity weakness    Generalized abdominal pain    Non-intractable vomiting    Medical clearance for psychiatric admission    Carbuncle    Alcohol dependence with unspecified alcohol-induced disorder ([facility] )    Mild intermittent asthma without complication    Tobacco abuse    Ear problem, bilateral    Gastroesophageal reflux disease    Right foot pain    Ear problem, right     Review of systems:         Did need Votaren and  Robaxin for foot pain and muscle spasms otherwise unremarkable  Diagnosis:              Schizoaffective bipolar, alcohol use disorder in early remission in controlled environment  Assessment   Overall Status:        No need for p r n  meds and one small  episode of yelling  only but still delusional about someone stalking her and now living upstairs talking through the vents and continues to believe that staff is doing hypnosis on her   Certification Statement: The patient will continue to require additional inpatient hospital stay due to ongoing mood swings paranoia   Acceptance by patient:  accepting    Hopefulness in recovery:  Living in a group home   Understanding of medications: has good understanding   Involved in reintegration process:  Adjusting to the unit   Trusting in relationship with psychiatrist:  trusting   Justification for dual anti-psychotics: due to lack of response to single antipsychotics   Side effects from treatment: none    Medication changes    and today  Medication Education; Risks and S/E and precautions of all meds given to patient and she did verbalize an understanding   Non-pharmacological treatments   Continue with individual, group, milieu and occupational therapy using recovery principles and psycho-education about accepting illness and the need for treatment  Safety   Safety and communication plan established to target dynamic risk factors discussed above  Discharge Plan     most likely to a group home with the act team again     Interval Progress       Patient  not reportedly yelling or screaming yesterday except briefly for a minute as reported by staff  1 time only, and did not need any p r n  meds  She had her teeth checked out and some bonding done at the dental clinic yesterday morning  She is still delusional about him and stalking her for years and is demented and staying upstairs on the floor and talking to her through the vents on the ceiling  Still believes that staff is who did icing her    Not aggressive threatening self-abusive or destructive, attending groups remains well groomed and well kept and trying to practice positive coping skills   Sleep:                  good   appetite:               good   compliance with medications :        compliant   Side  Effects:                            None reported   significant events:                   Delusional but not screaming but appears to still hear voices   group attendance:            Attending most of the groups    Mental Status Exam  Appearance: age appropriate, casually dressed, looks older than stated age, underweight    Appears animated well groomed found in the hallway friendly and pleasant  Behavior: normal, pleasant, cooperative, mildly anxious,  more friendly today and not confrontational     Speech: fluent, clear, coherent, increased rate, hypertalkative, Circumstantial pressured    Mood: depressed, anxious, euphoric  Affect: labile, reactive, slightly brighter  Anxious at times  Thought Process: normal abstract reasoning, less prominent, tends to be pressured and circumstantial and preoccupied perseverating on discharge    Thought Content: some paranoia, ideas of reference, but does appear as if paranoid  No current suicidal homicidal thoughts intent or plans verbalized  No phobias obsessions compulsions or distorted body perceptions elicited  Also believes TV is sending her messages from movie stars checking her if she is good or bad and threatening to get the feds to go after her  Continues to believe when all man is staying upstairs talking to her through the vents who has been stalking her for years   Perceptual Disturbances: no auditory hallucinations, no visual hallucinations, denies auditory hallucinations when asked, does not appear responding to internal stimuli, no voices reported today       Hx Risk Factors: chronic mood disorder, chronic psychotic symptoms, alcohol use, limted insight  Sensorium:      Oriented to 3 spheres and to situation  Cognition: recent and remote memory grossly intact  Consciousness: alert and awake  Attention: attention span and concentration are age appropriate  Intellect: appears to be of average intelligence  Insight: improving  Judgement: improving  Motor Activity: no abnormal movements     Vitals  Temp:  [97 2 °F (36 2 °C)-97 4 °F (36 3 °C)] 97 4 °F (36 3 °C)  HR:  [] 94  Resp:  [16-18] 16  BP: (107-121)/(65-71) 121/67  No intake or output data in the 24 hours ending 06/11/21 0545    Lab Results:  [facility] Admission Reviewed     Current Facility-Administered Medications   Medication Dose Route Frequency Provider Last Rate    acetaminophen  650 mg Oral Q6H PRSHONA Suarez MD      acetaminophen  650 mg Oral Q4H PRN Courtney Suarez MD      acetaminophen  975 mg Oral Q6H PRMD Yelena Lam al mag oxide-diphenhydramine-lidocaine viscous  10 mL Swish & Swallow Q4H PRN Geoffry Brochure, MD      albuterol  2 puff Inhalation Q6H PRN Geoffry Brochure, MD      aluminum-magnesium hydroxide-simethicone  15 mL Oral Q4H PRN Geoffry Brochure, MD      benztropine  1 mg Intramuscular Q4H PRN Max 6/day Geoffry Brochure, MD      benztropine  1 mg Oral Q4H PRN Max 6/day Geoffry Brochure, MD      benztropine  1 mg Oral BID Geoffry Brochure, MD      calcium carbonate  500 mg Oral TID PRN Geoffry Brochure, MD      Diclofenac Sodium  2 g Topical 4x Daily PRN Geoffry Brochure, MD      diphenhydrAMINE  25 mg Oral HS Geoffry Brochure, MD      fenofibrate  145 mg Oral Daily Geoffry Brochure, MD      gabapentin  600 mg Oral 4x Daily Geoffry Brochure, MD      haloperidol  1 mg Oral 4x Daily Geoffry Brochure, MD      haloperidol  5 mg Oral Q6H PRN Geoffry Brochure, MD      hydrOXYzine HCL  25 mg Oral Q6H PRN Max 4/day Geoffry Brochure, MD      hydrOXYzine HCL  50 mg Oral Q6H PRN Max 4/day Geoffry Brochure, MD      lidocaine  1 patch Topical Daily Geoffry Brochure, MD      LORazepam  1 mg Intramuscular Q6H PRN Geoffry Brochure, MD      LORazepam  0 5 mg Oral 4x Daily Geoffry Brochure, MD      magnesium hydroxide  30 mL Oral Daily PRN Geoffry Brochure, MD      methocarbamol  500 mg Oral Q8H PRN Geoffry Brochure, MD      nicotine  1 patch Transdermal Daily Geoffry Brochure, MD      nicotine polacrilex  4 mg Oral Q2H PRN Geoffry Brochure, MD      OLANZapine  2 5 mg Oral Q8H PRN Geoffry Brochure, MD      OLANZapine  5 mg Oral Q3H PRN Geoffry Brochure, MD      OLANZapine  7 5 mg Oral Q3H PRN Geoffry Brochure, MD      OLANZapine  10 mg Intramuscular Q3H PRN Geoffry Brochure, MD      OLANZapine  5 mg Intramuscular Q3H PRN Geoffry Brochure, MD      OLANZapine  10 mg Oral HS Geoffry Brochure, MD      OLANZapine  15 mg Oral Daily Geoffry Brochure, MD      paliperidone palmitate ER  234 mg Intramuscular Q30 Days Geoffry Brochure, MD      pantoprazole  40 mg Oral Early Morning Geoffry Brochure, MD      Pramox-PE-Glycerin-Petrolatum  1 application Rectal 4x Daily PRN Yesica Medina MD      prazosin  1 mg Oral HS Yesica Medina MD      psyllium  1 packet Oral Daily Yesica Medina MD      sertraline  100 mg Oral Daily Yesica Medina MD         Counseling / Coordination of Care: Total floor / unit time spent today 15 minutes  Greater than 50% of total time was spent with the patient and / or family counseling and / or somewhat receptive to supportive listening and teaching positive coping skills to deal with symptom mangement  Patient's Rights, confidentiality and exceptions to confidentiality, use of automated medical record, [facility] [place] [facility] staff access to medical record, and consent to treatment reviewed  This note was  not shared with the patient because it might further aggravate her psychiatric condition  This note has been dictated

## 2021-06-11 NOTE — PROGRESS NOTES
06/11/21 1100   Activity/Group Checklist   Group Other (Comment)  (Recovery Management: Guided Imagery Meditation)   Attendance Did not attend     Intended to stay in group, but informed this SW that she is struggling today, so she left as she didn't was to be disruptive

## 2021-06-11 NOTE — NURSING NOTE
Sabrina Mcmanus maintained on ongoing SAFE precaution without incident on this shift   Laying in bed with eyes closed, breath even and unlabored    Q 7 minutes rounding implemented   Behavioral control no outburst   Will continue to monitor

## 2021-06-12 PROCEDURE — 99232 SBSQ HOSP IP/OBS MODERATE 35: CPT | Performed by: NURSE PRACTITIONER

## 2021-06-12 RX ADMIN — PHENYTOIN 1 MG: 125 SUSPENSION ORAL at 21:07

## 2021-06-12 RX ADMIN — HALOPERIDOL 1 MG: 1 TABLET ORAL at 12:09

## 2021-06-12 RX ADMIN — ACETAMINOPHEN 975 MG: 325 TABLET ORAL at 10:46

## 2021-06-12 RX ADMIN — METHOCARBAMOL TABLETS 500 MG: 500 TABLET, COATED ORAL at 13:01

## 2021-06-12 RX ADMIN — LORAZEPAM 0.5 MG: 0.5 TABLET ORAL at 12:09

## 2021-06-12 RX ADMIN — FENOFIBRATE 145 MG: 145 TABLET ORAL at 08:22

## 2021-06-12 RX ADMIN — GABAPENTIN 600 MG: 300 CAPSULE ORAL at 12:09

## 2021-06-12 RX ADMIN — HALOPERIDOL 1 MG: 1 TABLET ORAL at 21:08

## 2021-06-12 RX ADMIN — LIDOCAINE 1 PATCH: 50 PATCH CUTANEOUS at 10:02

## 2021-06-12 RX ADMIN — NICOTINE 1 PATCH: 21 PATCH, EXTENDED RELEASE TRANSDERMAL at 10:04

## 2021-06-12 RX ADMIN — PANTOPRAZOLE SODIUM 40 MG: 40 TABLET, DELAYED RELEASE ORAL at 06:14

## 2021-06-12 RX ADMIN — OLANZAPINE 10 MG: 10 TABLET, FILM COATED ORAL at 21:08

## 2021-06-12 RX ADMIN — LORAZEPAM 0.5 MG: 0.5 TABLET ORAL at 17:18

## 2021-06-12 RX ADMIN — OLANZAPINE 15 MG: 5 TABLET, FILM COATED ORAL at 08:22

## 2021-06-12 RX ADMIN — GABAPENTIN 600 MG: 300 CAPSULE ORAL at 17:18

## 2021-06-12 RX ADMIN — HALOPERIDOL 1 MG: 1 TABLET ORAL at 08:24

## 2021-06-12 RX ADMIN — BENZTROPINE MESYLATE 1 MG: 1 TABLET ORAL at 17:18

## 2021-06-12 RX ADMIN — PSYLLIUM HUSK 1 PACKET: 3.4 POWDER ORAL at 08:22

## 2021-06-12 RX ADMIN — BENZTROPINE MESYLATE 1 MG: 1 TABLET ORAL at 08:22

## 2021-06-12 RX ADMIN — LORAZEPAM 0.5 MG: 0.5 TABLET ORAL at 08:22

## 2021-06-12 RX ADMIN — SERTRALINE HYDROCHLORIDE 100 MG: 100 TABLET ORAL at 08:23

## 2021-06-12 RX ADMIN — DIPHENHYDRAMINE HCL 25 MG: 25 TABLET ORAL at 21:07

## 2021-06-12 RX ADMIN — GABAPENTIN 600 MG: 300 CAPSULE ORAL at 08:22

## 2021-06-12 RX ADMIN — DICLOFENAC SODIUM 2 G: 10 GEL TOPICAL at 10:27

## 2021-06-12 RX ADMIN — HALOPERIDOL 1 MG: 1 TABLET ORAL at 17:18

## 2021-06-12 RX ADMIN — LORAZEPAM 0.5 MG: 0.5 TABLET ORAL at 21:08

## 2021-06-12 RX ADMIN — GABAPENTIN 600 MG: 300 CAPSULE ORAL at 21:07

## 2021-06-12 NOTE — NURSING NOTE
Harris has been out and about on the unit throughout the shift  At times she will lay in bed for a short period of time  Took medication without an issue  Ate 100% of her meals  Showered this AM  Lidoderm patch was applied to her lower back after her shower  Nicotine patch applied to her left arm  Attended community meeting and journal/electronic group  Voltaren gel to right foot/ankle at 1027 for 10/10 pain  Approached nurses station and stated that gel did not help her pain  Tylenol 975mg po given at 1046 for 10/10 pain  Later stated that the "pain is better"  Had 2 brief episodes of yelling out in her room  Did not require prn medication for same  Made a couple phone calls  Medicated with Robaxin at 1301 for lower back issues  Attended Art therapy  Still complaining of pain in her feet  Medical made aware  No new orders  Continue to monitor  Precautions maintained

## 2021-06-12 NOTE — PROGRESS NOTES
Progress Note - Behavioral Health   Yeni Sizer 49 y o  female MRN: [id number]  Unit/Bed#: [facility] 112-01 Encounter: [id number]    The patient was seen for continuing care and reviewed with treatment team     Schizoaffective disorder, bipolar type ([address] )    Vital signs in last 24 hours:  Temp:  [97 5 °F (36 4 °C)-97 8 °F (36 6 °C)] 97 5 °F (36 4 °C)  HR:  [] 92  Resp:  [16-18] 18  BP: ()/(52-74) 107/70    Mental Status Evaluation:    Appearance Adequate hygiene and grooming   Behavior  tearful   Mood depressed and  sad and tearful   Speech Normal rate and volume   Affect mood-congruent   Thought Processes Goal directed and coherent   Thought Content Paranoid and mistrustful   Perceptual Disturbances Denies hallucinations and does not appear to be responding to internal stimuli   Risk Potential Suicidal/Homicidal Ideation - No evidence of suicidal or homicidal ideation and Patient does not verbalize suicidal or homicidal ideation  Risk of Violence - No evidence of risk for violence found on assessment  Risk of Self Mutilation - No evidence of risk for self mutilation found on assessment   Sensorium oriented to person, place, time/date and situation   Cognition/Memory recent and remote memory grossly intact   Consciousness alert and awake   Attention/Concentration attention span and concentration are age appropriate   Insight limited   Judgement limited   Muscle Strength and Gait/Station normal muscle strength and normal muscle tone, normal gait/station and normal balance   Motor Activity no abnormal movements       Progress Toward Goals:  Patient is observed quickly becoming angry upset and tearful after hanging up the phone  States she feels sad and depressed  Feels that antidepressant is making her more depressed since last medication increase  Patient also states that she feels that an unknown other is intentionally making her life difficult  Patient is not able to articulate this person might be  Staff reports she is med and meal compliant  Staff reports that the patient had an earlier outburst this a m  and was also agitated yesterday  Has also had numerous somatic complaints this am       Recommended Treatment: Continue with pharmacotherapy, group therapy, milieu therapy and occupational therapy    The patient will be maintained on the following medications:  Current Facility-Administered Medications   Medication Dose Route Frequency Provider Last Rate    acetaminophen  650 mg Oral Q6H PRN Mariel Philip MD      acetaminophen  650 mg Oral Q4H PRN Mariel Philip MD      acetaminophen  975 mg Oral Q6H PRN Mariel Philip MD      al mag oxide-diphenhydramine-lidocaine viscous  10 mL Swish & Swallow Q4H PRN Mariel Philip MD      albuterol  2 puff Inhalation Q6H PRN Mariel Philip MD      aluminum-magnesium hydroxide-simethicone  15 mL Oral Q4H PRN Mariel Philip MD      benztropine  1 mg Intramuscular Q4H PRN Max 6/day Mariel Philip MD      benztropine  1 mg Oral Q4H PRN Max 6/day Mariel Philip MD      benztropine  1 mg Oral BID Mariel Philip MD      calcium carbonate  500 mg Oral TID PRN Mariel Philip MD      Diclofenac Sodium  2 g Topical 4x Daily PRN Mariel Philip MD      diphenhydrAMINE  25 mg Oral HS Mariel Philip MD      fenofibrate  145 mg Oral Daily Mariel Philip MD      gabapentin  600 mg Oral 4x Daily Mariel Philip MD      haloperidol  1 mg Oral 4x Daily Mariel Philip MD      haloperidol  5 mg Oral Q6H PRN Mariel Philip MD      hydrOXYzine HCL  25 mg Oral Q6H PRN Max 4/day Mariel Philip MD      hydrOXYzine HCL  50 mg Oral Q6H PRN Max 4/day Mariel Philip MD      lidocaine  1 patch Topical Daily Mariel Philip MD      LORazepam  1 mg Intramuscular Q6H PRN Mariel Philip MD      LORazepam  0 5 mg Oral 4x Daily Mariel Philip MD      magnesium hydroxide  30 mL Oral Daily PRN Mariel Philip MD      methocarbamol  500 mg Oral Q8H PRN Mariel Philip MD      nicotine  1 patch Transdermal Daily Mariel Philip MD  nicotine polacrilex  4 mg Oral Q2H PRN Eze Plata MD      OLANZapine  2 5 mg Oral Q8H PRN Eze Plata MD      OLANZapine  5 mg Oral Q3H PRN Eze Plata MD      OLANZapine  7 5 mg Oral Q3H PRN Eze Plata MD      OLANZapine  10 mg Intramuscular Q3H PRN Eze Plata MD      OLANZapine  5 mg Intramuscular Q3H PRN Eze Plata MD      OLANZapine  10 mg Oral HS Eze Plata MD      OLANZapine  15 mg Oral Daily Eze Plata MD      paliperidone palmitate ER  234 mg Intramuscular Q30 Days Eze Plata MD      pantoprazole  40 mg Oral Early Morning Eze Plata MD      Pramox-PE-Glycerin-Petrolatum  1 application Rectal 4x Daily PRN Eze Plata MD      prazosin  1 mg Oral HS Eze Plata MD      psyllium  1 packet Oral Daily Eze Plata MD      sertraline  100 mg Oral Daily Eze Plata MD         Schizoaffective disorder, bipolar type ([facility] [address] )

## 2021-06-12 NOTE — NURSING NOTE
Patient is visible for short periods of time   She had several episodes of screaming in her room She continues with various somatic complaints and is irritable and labile but was able to take redirection and later in the evening she was calm and somewhat pleasant

## 2021-06-13 PROCEDURE — 99232 SBSQ HOSP IP/OBS MODERATE 35: CPT | Performed by: NURSE PRACTITIONER

## 2021-06-13 RX ADMIN — GABAPENTIN 600 MG: 300 CAPSULE ORAL at 21:16

## 2021-06-13 RX ADMIN — GABAPENTIN 600 MG: 300 CAPSULE ORAL at 17:35

## 2021-06-13 RX ADMIN — HALOPERIDOL 1 MG: 1 TABLET ORAL at 17:35

## 2021-06-13 RX ADMIN — SERTRALINE HYDROCHLORIDE 100 MG: 100 TABLET ORAL at 08:38

## 2021-06-13 RX ADMIN — LORAZEPAM 0.5 MG: 0.5 TABLET ORAL at 21:16

## 2021-06-13 RX ADMIN — BENZTROPINE MESYLATE 1 MG: 1 TABLET ORAL at 17:35

## 2021-06-13 RX ADMIN — HALOPERIDOL 1 MG: 1 TABLET ORAL at 08:38

## 2021-06-13 RX ADMIN — LORAZEPAM 0.5 MG: 0.5 TABLET ORAL at 17:35

## 2021-06-13 RX ADMIN — OLANZAPINE 10 MG: 10 TABLET, FILM COATED ORAL at 21:16

## 2021-06-13 RX ADMIN — PSYLLIUM HUSK 1 PACKET: 3.4 POWDER ORAL at 08:39

## 2021-06-13 RX ADMIN — DIPHENHYDRAMINE HCL 25 MG: 25 TABLET ORAL at 21:16

## 2021-06-13 RX ADMIN — METHOCARBAMOL TABLETS 500 MG: 500 TABLET, COATED ORAL at 14:04

## 2021-06-13 RX ADMIN — HALOPERIDOL 1 MG: 1 TABLET ORAL at 21:16

## 2021-06-13 RX ADMIN — OLANZAPINE 15 MG: 5 TABLET, FILM COATED ORAL at 08:38

## 2021-06-13 RX ADMIN — FENOFIBRATE 145 MG: 145 TABLET ORAL at 08:38

## 2021-06-13 RX ADMIN — LORAZEPAM 0.5 MG: 0.5 TABLET ORAL at 11:46

## 2021-06-13 RX ADMIN — NICOTINE 1 PATCH: 21 PATCH, EXTENDED RELEASE TRANSDERMAL at 08:40

## 2021-06-13 RX ADMIN — PHENYTOIN 1 MG: 125 SUSPENSION ORAL at 21:16

## 2021-06-13 RX ADMIN — LORAZEPAM 0.5 MG: 0.5 TABLET ORAL at 08:38

## 2021-06-13 RX ADMIN — BENZTROPINE MESYLATE 1 MG: 1 TABLET ORAL at 08:38

## 2021-06-13 RX ADMIN — GABAPENTIN 600 MG: 300 CAPSULE ORAL at 08:38

## 2021-06-13 RX ADMIN — PANTOPRAZOLE SODIUM 40 MG: 40 TABLET, DELAYED RELEASE ORAL at 06:08

## 2021-06-13 RX ADMIN — HALOPERIDOL 1 MG: 1 TABLET ORAL at 11:46

## 2021-06-13 RX ADMIN — GABAPENTIN 600 MG: 300 CAPSULE ORAL at 11:47

## 2021-06-13 RX ADMIN — LIDOCAINE 1 PATCH: 50 PATCH CUTANEOUS at 08:39

## 2021-06-13 NOTE — NURSING NOTE
Rhina Merlyn has been visible out and about on the unit throughout the shift  Ate 100% of her meal  Took medication without prompting  No mention of pain at this time  Participated in therapeutic activity group  Social with select female peer  Argumentative with female MHT about being able to do laundry today  Attended Wrap up group and did self assessment form  Had 2 brief episodes of yelling out  Ate snack  Took HS medication  Patches removed and disposed of prior to going to bed  Continue to monitor  Precautions maintained

## 2021-06-13 NOTE — PLAN OF CARE
Problem: Alteration in Thoughts and Perception  Goal: Treatment Goal: Gain control of psychotic behaviors/thinking, reduce/eliminate presenting symptoms and demonstrate improved reality functioning upon discharge  Outcome: Not Progressing  Goal: Verbalize thoughts and feelings  Description: Interventions:  - Promote a nonjudgmental and trusting relationship with the patient through active listening and therapeutic communication  - Assess patient's level of functioning, behavior and potential for risk  - Engage patient in 1 on 1 interactions  - Encourage patient to express fears, feelings, frustrations, and discuss symptoms    - [place] patient to reality, help patient recognize reality-based thinking   - Administer medications as ordered and assess for potential side effects  - Provide the patient education related to the signs and symptoms of the illness and desired effects of prescribed medications  Outcome: Progressing  Goal: Refrain from acting on delusional thinking/internal stimuli  Description: Interventions:  - Monitor patient closely, per order   - Utilize least restrictive measures   - Set reasonable limits, give positive feedback for acceptable   - Administer medications as ordered and monitor of potential side effects  Outcome: Not Progressing  Goal: Agree to be compliant with medication regime, as prescribed and report medication side effects  Description: Interventions:  - Offer appropriate PRN medication and supervise ingestion; conduct AIMS, as needed   Outcome: Progressing  Goal: Attend and participate in unit activities, including therapeutic, recreational, and educational groups  Description: Interventions:  -Encourage Visitation and family involvement in care  Outcome: Progressing  Goal: Recognize dysfunctional thoughts, communicate reality-based thoughts at the time of discharge  Description: Interventions:  - Provide medication and psycho-education to assist patient in compliance and developing insight into his/her illness   Outcome: Not Progressing  Goal: Complete daily ADLs, including personal hygiene independently, as able  Description: Interventions:  - Observe, teach, and assist patient with ADLS  - Monitor and promote a balance of rest/activity, with adequate nutrition and elimination   Outcome: Progressing     Problem: Ineffective Coping  Goal: Cooperates with admission process  Description: Interventions:   - Complete admission process  Outcome: Progressing  Goal: Identifies ineffective coping skills  Outcome: Progressing  Goal: Identifies healthy coping skills  Outcome: Progressing  Goal: Demonstrates healthy coping skills  Outcome: Not Progressing  Goal: Participates in unit activities  Description: Interventions:  - Provide therapeutic environment   - Provide required programming   - Redirect inappropriate behaviors   Outcome: Progressing  Goal: Patient/Family participate in treatment and DC plans  Description: Interventions:  - Provide therapeutic environment  Outcome: Progressing  Goal: Patient/Family verbalizes awareness of resources  Outcome: Progressing  Goal: Understands least restrictive measures  Description: Interventions:  - Utilize least restrictive behavior  Outcome: Progressing  Goal: Free from restraint events  Description: - Utilize least restrictive measures   - Provide behavioral interventions   - Redirect inappropriate behaviors   Outcome: Progressing     Problem: Risk for Self Injury/Neglect  Goal: Treatment Goal: Remain safe during length of stay, learn and adopt new coping skills, and be free of self-injurious ideation, impulses and acts at the time of discharge  Outcome: Not Progressing  Goal: Verbalize thoughts and feelings  Description: Interventions:  - Assess and re-assess patient's lethality and potential for self-injury  - Engage patient in 1:1 interactions, daily, for a minimum of 15 minutes  - Encourage patient to express feelings, fears, frustrations, hopes  - Establish rapport/trust with patient   Outcome: Progressing  Goal: Refrain from harming self  Description: Interventions:  - Monitor patient closely, per order  - Develop a trusting relationship  - Supervise medication ingestion, monitor effects and side effects   Outcome: Progressing  Goal: Attend and participate in unit activities, including therapeutic, recreational, and educational groups  Description: Interventions:  - Provide therapeutic and educational activities daily, encourage attendance and participation, and document same in the medical record  - Obtain collateral information, encourage visitation and family involvement in care   Outcome: Progressing  Goal: Recognize maladaptive responses and adopt new coping mechanisms  Outcome: Not Progressing  Goal: Complete daily ADLs, including personal hygiene independently, as able  Description: Interventions:  - Observe, teach, and assist patient with ADLS  - Monitor and promote a balance of rest/activity, with adequate nutrition and elimination  Outcome: Progressing     Problem: Depression  Goal: Treatment Goal: Demonstrate behavioral control of depressive symptoms, verbalize feelings of improved mood/affect, and adopt new coping skills prior to discharge  Outcome: Not Progressing  Goal: Verbalize thoughts and feelings  Description: Interventions:  - Assess and re-assess patient's level of risk   - Engage patient in 1:1 interactions, daily, for a minimum of 15 minutes   - Encourage patient to express feelings, fears, frustrations, hopes   Outcome: Progressing  Goal: Refrain from harming self  Description: Interventions:  - Monitor patient closely, per order   - Supervise medication ingestion, monitor effects and side effects   Outcome: Progressing  Goal: Refrain from isolation  Description: Interventions:  - Develop a trusting relationship   - Encourage socialization   Outcome: Progressing  Goal: Refrain from self-neglect  Outcome: Progressing  Goal: Attend and participate in unit activities, including therapeutic, recreational, and educational groups  Description: Interventions:  - Provide therapeutic and educational activities daily, encourage attendance and participation, and document same in the medical record   Outcome: Progressing  Goal: Complete daily ADLs, including personal hygiene independently, as able  Description: Interventions:  - Observe, teach, and assist patient with ADLS  -  Monitor and promote a balance of rest/activity, with adequate nutrition and elimination   Outcome: Progressing     Problem: Anxiety  Goal: Anxiety is at manageable level  Description: Interventions:  - Assess and monitor patient's anxiety level  - Monitor for signs and symptoms (heart palpitations, chest pain, shortness of breath, headaches, nausea, feeling jumpy, restlessness, irritable, apprehensive)  - Collaborate with interdisciplinary team and initiate plan and interventions as ordered    - [place] patient to unit/surroundings  - Explain treatment plan  - Encourage participation in care  - Encourage verbalization of concerns/fears  - Identify coping mechanisms  - Assist in developing anxiety-reducing skills  - Administer/offer alternative therapies  - Limit or eliminate stimulants  Outcome: Not Progressing     Problem: Risk for Violence/Aggression Toward Others  Goal: Treatment Goal: Refrain from acts of violence/aggression during length of stay, and demonstrate improved impulse control at the time of discharge  Outcome: Not Progressing  Goal: Verbalize thoughts and feelings  Description: Interventions:  - Assess and re-assess patient's level of risk, every waking shift  - Engage patient in 1:1 interactions, daily, for a minimum of 15 minutes   - Allow patient to express feelings and frustrations in a safe and non-threatening manner   - Establish rapport/trust with patient   Outcome: Progressing  Goal: Refrain from harming others  Outcome: Progressing  Goal: Refrain from destructive acts on the environment or property  Outcome: Progressing  Goal: Control angry outbursts  Description: Interventions:  - Monitor patient closely, per order  - Ensure early verbal de-escalation  - Monitor prn medication needs  - Set reasonable/therapeutic limits, outline behavioral expectations, and consequences   - Provide a non-threatening milieu, utilizing the least restrictive interventions   Outcome: Not Progressing  Goal: Attend and participate in unit activities, including therapeutic, recreational, and educational groups  Description: Interventions:  - Provide therapeutic and educational activities daily, encourage attendance and participation, and document same in the medical record   Outcome: Progressing  Goal: Identify appropriate positive anger management techniques  Description: Interventions:  - Offer anger management and coping skills groups   - Staff will provide positive feedback for appropriate anger control  Outcome: Not Progressing     Problem: Alteration in Orientation  Goal: Treatment Goal: Demonstrate a reduction of confusion and improved orientation to person, place, time and/or situation upon discharge, according to optimum baseline/ability  Outcome: Not Progressing  Goal: Interact with staff daily  Description: Interventions:  - Assess and re-assess patient's level of orientation  - Engage patient in 1 on 1 interactions, daily, for a minimum of 15 minutes   - Establish rapport/trust with patient   Outcome: Progressing  Goal: Express concerns related to confused thinking related to:  Description: Interventions:  - Encourage patient to express feelings, fears, frustrations, hopes  - Assign consistent caregivers   - [place]/re-orient patient as needed  - Allow comfort items, as appropriate  - Provide visual cues, signs, etc    Outcome: Not Progressing  Goal: Allow medical examinations, as recommended  Description: Interventions:  - Provide physical/neurological exams and/or referrals, per provider   Outcome: Progressing  Goal: Cooperate with recommended testing/procedures  Description: Interventions:  - Determine need for ancillary testing  - Observe for mental status changes  - Implement falls/precaution protocol   Outcome: Progressing  Goal: Attend and participate in unit activities, including therapeutic, recreational, and educational groups  Description: Interventions:  - Provide therapeutic and educational activities daily, encourage attendance and participation, and document same in the medical record   - Provide appropriate opportunities for reminiscence   - Provide a consistent daily routine   - Encourage family contact/visitation   Outcome: Progressing  Goal: Complete daily ADLs, including personal hygiene independently, as able  Description: Interventions:  - Observe, teach, and assist patient with ADLS  Outcome: Progressing     Problem: Individualized Interventions  Goal: Patient will verbalize appropriate use of telephone within 5 days  Description: Interventions:  - Treatment team to determine use of supervised phone privileges   Outcome: Progressing  Goal: Patient will verbalize need for hospitalization and will no longer attempt elopement within 5 days  Description: Interventions:  - Ongoing education to help patient understand need for hospitalization  Outcome: Progressing  Goal: Patient will recognize inappropriate behaviors and develop alternative behaviors within 5 days  Description: Interventions:  - Patient in collaboration with Treatment Team will develop a behavior management plan to help identify effective coping skills to deal with stressors  Outcome: Progressing

## 2021-06-13 NOTE — PROGRESS NOTES
Progress Note - Behavioral Health   Sanju Prior 49 y o  female MRN: [id number]  Unit/Bed#: [facility] 112-01 Encounter: [id number]    The patient was seen for continuing care and reviewed with treatment team     Schizoaffective disorder, bipolar type ([address] )    Vital signs in last 24 hours:  Temp:  [97 6 °F (36 4 °C)-97 9 °F (36 6 °C)] 97 9 °F (36 6 °C)  HR:  [] 109  Resp:  [15-18] 18  BP: (105-118)/(60-68) 118/60    Mental Status Evaluation:    Appearance Adequate hygiene and grooming   Behavior calm and cooperative   Mood depressed   Speech Normal rate and volume   Affect appropriate and mood-congruent   Thought Processes Goal directed and coherent   Thought Content Does not verbalize delusional material   Perceptual Disturbances Denies hallucinations and does not appear to be responding to internal stimuli   Risk Potential Suicidal/Homicidal Ideation - No evidence of suicidal or homicidal ideation and Patient does not verbalize suicidal or homicidal ideation  Risk of Violence - No evidence of risk for violence found on assessment  Risk of Self Mutilation - No evidence of risk for self mutilation found on assessment   Sensorium oriented to person, place, time/date and situation   Cognition/Memory recent and remote memory grossly intact   Consciousness alert and awake   Attention/Concentration attention span and concentration are age appropriate   Insight limited   Judgement limited   Muscle Strength and Gait/Station normal muscle strength and normal muscle tone, normal gait/station and normal balance   Motor Activity no abnormal movements       Progress Toward Goals:   Patient observed walking in hallway  On approach patient is polite and pleasant  States she is feeling much better today  Patient engages provider in discussion regarding persistence psychiatric symptoms    "How do I get rid of these thoughts about hypnosis " states that at times she has clarity and can see this thinking is delusional at other times she loses the ability to distinguish reality from delusion  States that this is interfering with her life and asks for advice  Discussed general best practices with patient including longterm medication compliance, stress management and psychotherapy  Reports she did eat breakfast this morning and slept well overnight  She denies any medication side effects  Staff does report that patient had a screaming episode later in the day yesterday and was somatic  No other issues reported      Recommended Treatment: Continue with pharmacotherapy, group therapy, milieu therapy and occupational therapy    The patient will be maintained on the following medications:  Current Facility-Administered Medications   Medication Dose Route Frequency Provider Last Rate    acetaminophen  650 mg Oral Q6H PRN Xavier Hannah, MD      acetaminophen  650 mg Oral Q4H PRN Xavier Hannah, MD      acetaminophen  975 mg Oral Q6H PRN Xavier Hannah MD      al mag oxide-diphenhydramine-lidocaine viscous  10 mL Swish & Swallow Q4H PRN Xavier Hannah MD      albuterol  2 puff Inhalation Q6H PRN Xavier Hannah MD      aluminum-magnesium hydroxide-simethicone  15 mL Oral Q4H PRN Xavier Hannah MD      benztropine  1 mg Intramuscular Q4H PRN Max 6/day Xavier Hannah MD      benztropine  1 mg Oral Q4H PRN Max 6/day Xavier Hannah MD      benztropine  1 mg Oral BID Xavier Hannah MD      calcium carbonate  500 mg Oral TID PRN Xavier Hannah MD      Diclofenac Sodium  2 g Topical 4x Daily PRN Xavier Hannah MD      diphenhydrAMINE  25 mg Oral HS Xavier Hannah MD      fenofibrate  145 mg Oral Daily Xavier Hannah MD      gabapentin  600 mg Oral 4x Daily Xavier Hannah MD      haloperidol  1 mg Oral 4x Daily Xavier Hannah MD      haloperidol  5 mg Oral Q6H PRN Xavier Hannah MD      hydrOXYzine HCL  25 mg Oral Q6H PRN Max 4/day Xavier Hannah MD      hydrOXYzine HCL  50 mg Oral Q6H PRN Max 4/day Xavier Hannah MD      lidocaine  1 patch Topical Daily Xavier Hannah, MD      LORazepam  1 mg Intramuscular Q6H PRN Chloe Shall, MD      LORazepam  0 5 mg Oral 4x Daily Chloe Shall, MD      magnesium hydroxide  30 mL Oral Daily PRN Chloe Shall, MD      methocarbamol  500 mg Oral Q8H PRN Chloe Shall, MD      nicotine  1 patch Transdermal Daily Chloe Shall, MD      nicotine polacrilex  4 mg Oral Q2H PRN Chloe Shall, MD      OLANZapine  2 5 mg Oral Q8H PRN Chloe Shall, MD      OLANZapine  5 mg Oral Q3H PRN Chloe Shall, MD      OLANZapine  7 5 mg Oral Q3H PRN Chloe Shall, MD      OLANZapine  10 mg Intramuscular Q3H PRN Chloe Shall, MD      OLANZapine  5 mg Intramuscular Q3H PRN Chloe Shall, MD      OLANZapine  10 mg Oral HS Chloe Shall, MD      OLANZapine  15 mg Oral Daily Chloe Shall, MD      paliperidone palmitate ER  234 mg Intramuscular Q30 Days Chloe Shall, MD      pantoprazole  40 mg Oral Early Morning Chloe Shall, MD      Pramox-PE-Glycerin-Petrolatum  1 application Rectal 4x Daily PRN Chloe Shall, MD      prazosin  1 mg Oral HS Chloe Shall, MD      psyllium  1 packet Oral Daily Chloe Shall, MD      sertraline  100 mg Oral Daily Chloe Shall, MD         Schizoaffective disorder, bipolar type ([facility] [address] )

## 2021-06-13 NOTE — PROGRESS NOTES
06/12/21 1300   Activity/Group Checklist   Group Other (Comment)  (OPEN STUDIO Art Therapy/Social Interaction-Open Activity)   Attendance Attended   Attendance Duration (min) 46-60   Interactions Interacted appropriately   Affect/Mood Appropriate   Goals Achieved Able to listen to others; Able to engage in interactions

## 2021-06-13 NOTE — PLAN OF CARE
Problem: Nutrition/Hydration-ADULT  Goal: Nutrient/Hydration intake appropriate for improving, restoring or maintaining nutritional needs  Description: Monitor and assess patient's nutrition/hydration status for malnutrition  Collaborate with interdisciplinary team and initiate plan and interventions as ordered  Monitor patient's weight and dietary intake as ordered or per policy  Utilize nutrition screening tool and intervene as necessary  Determine patient's food preferences and provide high-protein, high-caloric foods as appropriate       INTERVENTIONS:  - Monitor oral intake, urinary output, labs, and treatment plans  - Assess nutrition and hydration status and recommend course of action  - Evaluate amount of meals eaten  - Assist patient with eating if necessary   - Allow adequate time for meals  - Recommend/ encourage appropriate diets, oral nutritional supplements, and vitamin/mineral supplements  - Order, calculate, and assess calorie counts as needed  - Recommend, monitor, and adjust tube feedings and TPN/PPN based on assessed needs  - Assess need for intravenous fluids  - Provide specific nutrition/hydration education as appropriate  - Include patient/family/caregiver in decisions related to nutrition  Outcome: Progressing     Problem: Alteration in Thoughts and Perception  Goal: Verbalize thoughts and feelings  Description: Interventions:  - Promote a nonjudgmental and trusting relationship with the patient through active listening and therapeutic communication  - Assess patient's level of functioning, behavior and potential for risk  - Engage patient in 1 on 1 interactions  - Encourage patient to express fears, feelings, frustrations, and discuss symptoms    - [place] patient to reality, help patient recognize reality-based thinking   - Administer medications as ordered and assess for potential side effects  - Provide the patient education related to the signs and symptoms of the illness and desired effects of prescribed medications  Outcome: Progressing  Goal: Agree to be compliant with medication regime, as prescribed and report medication side effects  Description: Interventions:  - Offer appropriate PRN medication and supervise ingestion; conduct AIMS, as needed   Outcome: Progressing  Goal: Attend and participate in unit activities, including therapeutic, recreational, and educational groups  Description: Interventions:  -Encourage Visitation and family involvement in care  Outcome: Progressing  Goal: Complete daily ADLs, including personal hygiene independently, as able  Description: Interventions:  - Observe, teach, and assist patient with ADLS  - Monitor and promote a balance of rest/activity, with adequate nutrition and elimination   Outcome: Progressing

## 2021-06-13 NOTE — NURSING NOTE
Awake most of day, irritable at times with 1 episode of yelling noted  Visible but isolative to self  Med and meal compliant  Attended groups and made some phone calls   No issues offered

## 2021-06-14 PROCEDURE — 99232 SBSQ HOSP IP/OBS MODERATE 35: CPT | Performed by: PSYCHIATRY & NEUROLOGY

## 2021-06-14 RX ADMIN — ACETAMINOPHEN 650 MG: 325 TABLET ORAL at 22:22

## 2021-06-14 RX ADMIN — GABAPENTIN 600 MG: 300 CAPSULE ORAL at 17:05

## 2021-06-14 RX ADMIN — GABAPENTIN 600 MG: 300 CAPSULE ORAL at 21:16

## 2021-06-14 RX ADMIN — GABAPENTIN 600 MG: 300 CAPSULE ORAL at 11:24

## 2021-06-14 RX ADMIN — LORAZEPAM 0.5 MG: 0.5 TABLET ORAL at 17:05

## 2021-06-14 RX ADMIN — METHOCARBAMOL TABLETS 500 MG: 500 TABLET, COATED ORAL at 11:24

## 2021-06-14 RX ADMIN — HALOPERIDOL 1 MG: 1 TABLET ORAL at 21:16

## 2021-06-14 RX ADMIN — OLANZAPINE 15 MG: 5 TABLET, FILM COATED ORAL at 08:30

## 2021-06-14 RX ADMIN — GABAPENTIN 600 MG: 300 CAPSULE ORAL at 08:32

## 2021-06-14 RX ADMIN — HALOPERIDOL 1 MG: 1 TABLET ORAL at 11:24

## 2021-06-14 RX ADMIN — PSYLLIUM HUSK 1 PACKET: 3.4 POWDER ORAL at 08:33

## 2021-06-14 RX ADMIN — BENZTROPINE MESYLATE 1 MG: 1 TABLET ORAL at 17:05

## 2021-06-14 RX ADMIN — PHENYTOIN 1 MG: 125 SUSPENSION ORAL at 21:16

## 2021-06-14 RX ADMIN — DIPHENHYDRAMINE HCL 25 MG: 25 TABLET ORAL at 21:16

## 2021-06-14 RX ADMIN — LORAZEPAM 0.5 MG: 0.5 TABLET ORAL at 08:30

## 2021-06-14 RX ADMIN — NICOTINE 1 PATCH: 21 PATCH, EXTENDED RELEASE TRANSDERMAL at 08:34

## 2021-06-14 RX ADMIN — OLANZAPINE 10 MG: 10 TABLET, FILM COATED ORAL at 21:16

## 2021-06-14 RX ADMIN — HALOPERIDOL 1 MG: 1 TABLET ORAL at 08:32

## 2021-06-14 RX ADMIN — FENOFIBRATE 145 MG: 145 TABLET ORAL at 08:33

## 2021-06-14 RX ADMIN — LORAZEPAM 0.5 MG: 0.5 TABLET ORAL at 21:16

## 2021-06-14 RX ADMIN — PANTOPRAZOLE SODIUM 40 MG: 40 TABLET, DELAYED RELEASE ORAL at 06:03

## 2021-06-14 RX ADMIN — LORAZEPAM 0.5 MG: 0.5 TABLET ORAL at 11:24

## 2021-06-14 RX ADMIN — LORAZEPAM 0.5 MG: 0.5 TABLET ORAL at 08:31

## 2021-06-14 RX ADMIN — BENZTROPINE MESYLATE 1 MG: 1 TABLET ORAL at 08:33

## 2021-06-14 RX ADMIN — HALOPERIDOL 1 MG: 1 TABLET ORAL at 17:05

## 2021-06-14 RX ADMIN — LIDOCAINE 1 PATCH: 50 PATCH CUTANEOUS at 08:34

## 2021-06-14 RX ADMIN — SERTRALINE HYDROCHLORIDE 100 MG: 100 TABLET ORAL at 08:33

## 2021-06-14 NOTE — CASE MANAGEMENT
06/14/21 0909   Team Meeting   Meeting Type Daily Rounds   Initial Conference Date 06/14/21   Team Members Present   Team Members Present Physician;Nurse;; Other (Discipline and Name)   Physician Team Member Dr Lizzie Mccarty Management Team Member [address] [place] [address] Work Team Member Alfred Soulier   Other (Discipline and Name) [facility]   Patient/Family Present   Patient Present No   Patient's Family Present No     Case reviewed: yelling on Saturday, irritable at times, Robaxin PRN yesterday, Tylenol PRN on 6/12 for foot pain, 3/5 groups

## 2021-06-14 NOTE — NURSING NOTE
Patient is compliant with medication and meals  She attends just about all her groups She is social with most peers  She does have  A lot of somatic complaints On  She re  Sunday patient  was yelling get these voices out of my head

## 2021-06-14 NOTE — PLAN OF CARE
Problem: Alteration in Thoughts and Perception  Goal: Verbalize thoughts and feelings  Description: Interventions:  - Promote a nonjudgmental and trusting relationship with the patient through active listening and therapeutic communication  - Assess patient's level of functioning, behavior and potential for risk  - Engage patient in 1 on 1 interactions  - Encourage patient to express fears, feelings, frustrations, and discuss symptoms    - [place] patient to reality, help patient recognize reality-based thinking   - Administer medications as ordered and assess for potential side effects  - Provide the patient education related to the signs and symptoms of the illness and desired effects of prescribed medications  Outcome: Progressing  Goal: Attend and participate in unit activities, including therapeutic, recreational, and educational groups  Description: Interventions:  -Encourage Visitation and family involvement in care  Outcome: Progressing    Patient completed Goal Card for the week of 6/14/21    Goals: Write 3 things that anger me and 3 coping skills for each             Learn more about my medications              Do 2 positive things for myself this week and write them down

## 2021-06-14 NOTE — PROGRESS NOTES
Patient attended group today  She continued to be clear in her conversation and was able to address some of her future  Goals  Supplied some suggestions for her not to take on more than she can antwon  She was getting irritable at the end, but controled

## 2021-06-14 NOTE — PLAN OF CARE
Problem: Alteration in Thoughts and Perception  Goal: Agree to be compliant with medication regime, as prescribed and report medication side effects  Description: Interventions:  - Offer appropriate PRN medication and supervise ingestion; conduct AIMS, as needed   Outcome: Progressing  Goal: Attend and participate in unit activities, including therapeutic, recreational, and educational groups  Description: Interventions:  -Encourage Visitation and family involvement in care  Outcome: Progressing  Goal: Complete daily ADLs, including personal hygiene independently, as able  Description: Interventions:  - Observe, teach, and assist patient with ADLS  - Monitor and promote a balance of rest/activity, with adequate nutrition and elimination   Outcome: Progressing     Problem: Ineffective Coping  Goal: Identifies healthy coping skills  Outcome: Progressing     Problem: Depression  Goal: Treatment Goal: Demonstrate behavioral control of depressive symptoms, verbalize feelings of improved mood/affect, and adopt new coping skills prior to discharge  Outcome: Progressing     Problem: Alteration in Thoughts and Perception  Goal: Refrain from acting on delusional thinking/internal stimuli  Description: Interventions:  - Monitor patient closely, per order   - Utilize least restrictive measures   - Set reasonable limits, give positive feedback for acceptable   - Administer medications as ordered and monitor of potential side effects  Outcome: Not Progressing

## 2021-06-14 NOTE — PROGRESS NOTES
Psychiatry Progress Note [facility] 49 y o  female MRN: [id number]  Unit/Bed#: [facility] 271-55 Encounter: [id number]  Code Status: Level 1 - Full Code    PCP: Naty Calvin DO    Date of Admission:  4/7/2021 1435   Date of Service:  06/14/21    Patient Active Problem List   Diagnosis    Schizoaffective disorder, bipolar type ([facility] )    Uncomplicated alcohol dependence ([facility] )    Suicidal behavior    LYNN (generalized anxiety disorder)    Slow transit constipation    Deficiency of vitamin B    Degenerative disc disease, lumbar    Post-traumatic stress disorder, chronic    Lower extremity weakness    Generalized abdominal pain    Non-intractable vomiting    Medical clearance for psychiatric admission    Carbuncle    Alcohol dependence with unspecified alcohol-induced disorder ([facility] )    Mild intermittent asthma without complication    Tobacco abuse    Ear problem, bilateral    Gastroesophageal reflux disease    Right foot pain    Ear problem, right     Review of systems:        Robaxin for a foot pain along with Tylenol,   Otherwise unremarkable  Diagnosis:             Schizoaffective bipolar, alcohol use disorder in early remission in controlled environment  Assessment   Overall Status:         Continues to scream and yell occasionally and still delusional about someone stalking her living upstairs talking to her through the vents on feels depression is getting worse with increase in Zoloft   Certification Statement: The patient will continue to require additional inpatient hospital stay due to ongoing mood swings paranoia   Acceptance by patient:  accepting    Hopefulness in recovery:  Living in a group home   Understanding of medications: has good understanding   Involved in reintegration process:  Adjusting to the unit   Trusting in relationship with psychiatrist:  trusting   Justification for dual anti-psychotics: due to lack of response to single antipsychotics   Side effects from treatment: none    Medication changes    and today  Medication Education; Risks and S/E and precautions of all meds given to patient and she did verbalize an understanding   Non-pharmacological treatments   Continue with individual, group, milieu and occupational therapy using recovery principles and psycho-education about accepting illness and the need for treatment  Safety   Safety and communication plan established to target dynamic risk factors discussed above  Discharge Plan     most likely to a group home with the act team again     Interval Progress        Patient is again yelling and screaming for brief periods  Still delusional about someone stalking her for years and living upstairs on the floor about talking to her through the vents from the ceiling and believes that people on the unit are hypnotized thing her    Not aggressive threatening or self-abusive and trying to attend groups but still preoccupied and demanding at times     Sleep:                   good   appetite:                good   compliance with medications :         compliant   Side  Effects:                            None reported   significant events:                    Delusional  And screaming at times and  responding   group attendance:             Attending most of the groups 3/5 groups last Friday    Mental Status Exam  Appearance: age appropriate, casually dressed, looks older than stated age, underweight    Found sitting in the dining acharya listening to music from her laptop  Behavior: normal, pleasant, cooperative, mildly anxious,  more friendly today and not confrontational     Speech: fluent, clear, coherent, increased rate, hypertalkative,   Circumstantial pressured    Mood: depressed, anxious, euphoric  Affect: labile, reactive, slightly brighter  anxious  Thought Process: normal abstract reasoning, less prominent, tends to be pressured and circumstantial and preoccupied perseverating on discharge    Thought Content: some paranoia, ideas of reference, but does appear as if paranoid  No current suicidal homicidal thoughts intent or plans verbalized  No phobias obsessions compulsions or distorted body perceptions elicited  Also believes TV is sending her messages from movie stars checking her if she is good or bad and threatening to get the feds to go after her  Still focused about the man living upstairs home she claims he is stalking her for years and is talking to her through the vents   Perceptual Disturbances: no auditory hallucinations, no visual hallucinations, denies auditory hallucinations when asked, does not appear responding to internal stimuli, no voices reported today       Hx Risk Factors: chronic mood disorder, chronic psychotic symptoms, alcohol use, limted insight  Sensorium:      Oriented to 3 spheres and to situation  Cognition: recent and remote memory grossly intact  Consciousness: alert and awake  Attention: attention span and concentration are age appropriate  Intellect: appears to be of average intelligence  Insight: improving  Judgement: improving  Motor Activity: no abnormal movements     Vitals  Temp:  [97 3 °F (36 3 °C)-97 9 °F (36 6 °C)] 97 3 °F (36 3 °C)  HR:  [] 97  Resp:  [18] 18  BP: (118-132)/(60-73) 123/73  No intake or output data in the 24 hours ending 06/14/21 0544    Lab Results:  [facility] Admission Reviewed     Current Facility-Administered Medications   Medication Dose Route Frequency Provider Last Rate    acetaminophen  650 mg Oral Q6H PRN Gage Hernández MD      acetaminophen  650 mg Oral Q4H PRN Gage Hernández MD      acetaminophen  975 mg Oral Q6H PRN Gage Hernández MD      al mag oxide-diphenhydramine-lidocaine viscous  10 mL Swish & Swallow Q4H PRN Gage Hernández MD      albuterol  2 puff Inhalation Q6H PRN Gage Hernández MD      aluminum-magnesium hydroxide-simethicone  15 mL Oral Q4H PRN MD Rupa Mckeon benztropine  1 mg Intramuscular Q4H PRN Max 6/day Debby Link MD      benztropine  1 mg Oral Q4H PRN Max 6/day Debby Link MD      benztropine  1 mg Oral BID Debby Link MD      calcium carbonate  500 mg Oral TID PRN Debby Link MD      Diclofenac Sodium  2 g Topical 4x Daily PRN Debby Link MD      diphenhydrAMINE  25 mg Oral HS Debby Link MD      fenofibrate  145 mg Oral Daily Debby Link MD      gabapentin  600 mg Oral 4x Daily Debby Link MD      haloperidol  1 mg Oral 4x Daily Debby Link MD      haloperidol  5 mg Oral Q6H PRN Debby Link MD      hydrOXYzine HCL  25 mg Oral Q6H PRN Max 4/day Debby Link MD      hydrOXYzine HCL  50 mg Oral Q6H PRN Max 4/day Debby Link MD      lidocaine  1 patch Topical Daily Debby Link MD      LORazepam  1 mg Intramuscular Q6H PRN Debby Link MD      LORazepam  0 5 mg Oral 4x Daily Debby Link MD      magnesium hydroxide  30 mL Oral Daily PRN Debby Link MD      methocarbamol  500 mg Oral Q8H PRN Debby Link MD      nicotine  1 patch Transdermal Daily Debby Link MD      nicotine polacrilex  4 mg Oral Q2H PRN Debby Link MD      OLANZapine  2 5 mg Oral Q8H PRN Debby Link MD      OLANZapine  5 mg Oral Q3H PRN Debby Link MD      OLANZapine  7 5 mg Oral Q3H PRN Debby Link MD      OLANZapine  10 mg Intramuscular Q3H PRN Debby Link MD      OLANZapine  5 mg Intramuscular Q3H PRN Debby Link MD      OLANZapine  10 mg Oral HS Debby Link MD      OLANZapine  15 mg Oral Daily Debby Link MD      paliperidone palmitate ER  234 mg Intramuscular Q30 Days Debby Link MD      pantoprazole  40 mg Oral Early Morning Debby Link MD      Pramox-PE-Glycerin-Petrolatum  1 application Rectal 4x Daily PRN Debby Link MD      prazosin  1 mg Oral HS Debby Link MD      psyllium  1 packet Oral Daily Debby Link MD      sertraline  100 mg Oral Daily Debby Link MD         Counseling / Coordination of Care:  Total floor / unit time spent today 15 minutes  Greater than 50% of total time was spent with the patient and / or family counseling and / or somewhat receptive to supportive listening and teaching positive coping skills to deal with symptom mangement  Patient's Rights, confidentiality and exceptions to confidentiality, use of automated medical record, [facility] staff access to medical record, and consent to treatment reviewed  This note was  not shared with the patient because it might further aggravate her psychiatric condition  This note has been dictated

## 2021-06-15 PROCEDURE — 99232 SBSQ HOSP IP/OBS MODERATE 35: CPT | Performed by: PSYCHIATRY & NEUROLOGY

## 2021-06-15 RX ADMIN — LORAZEPAM 0.5 MG: 0.5 TABLET ORAL at 08:58

## 2021-06-15 RX ADMIN — OLANZAPINE 15 MG: 5 TABLET, FILM COATED ORAL at 08:59

## 2021-06-15 RX ADMIN — BENZTROPINE MESYLATE 1 MG: 1 TABLET ORAL at 08:57

## 2021-06-15 RX ADMIN — HALOPERIDOL 1 MG: 1 TABLET ORAL at 17:41

## 2021-06-15 RX ADMIN — HALOPERIDOL 1 MG: 1 TABLET ORAL at 12:49

## 2021-06-15 RX ADMIN — NICOTINE 1 PATCH: 21 PATCH, EXTENDED RELEASE TRANSDERMAL at 08:59

## 2021-06-15 RX ADMIN — BENZTROPINE MESYLATE 1 MG: 1 TABLET ORAL at 17:40

## 2021-06-15 RX ADMIN — LORAZEPAM 0.5 MG: 0.5 TABLET ORAL at 12:49

## 2021-06-15 RX ADMIN — LORAZEPAM 0.5 MG: 0.5 TABLET ORAL at 17:40

## 2021-06-15 RX ADMIN — LORAZEPAM 0.5 MG: 0.5 TABLET ORAL at 21:14

## 2021-06-15 RX ADMIN — GABAPENTIN 600 MG: 300 CAPSULE ORAL at 17:41

## 2021-06-15 RX ADMIN — PHENYTOIN 1 MG: 125 SUSPENSION ORAL at 21:14

## 2021-06-15 RX ADMIN — GABAPENTIN 600 MG: 300 CAPSULE ORAL at 12:49

## 2021-06-15 RX ADMIN — HALOPERIDOL 1 MG: 1 TABLET ORAL at 21:14

## 2021-06-15 RX ADMIN — HALOPERIDOL 1 MG: 1 TABLET ORAL at 08:58

## 2021-06-15 RX ADMIN — SERTRALINE HYDROCHLORIDE 100 MG: 100 TABLET ORAL at 09:44

## 2021-06-15 RX ADMIN — OLANZAPINE 10 MG: 10 TABLET, FILM COATED ORAL at 21:14

## 2021-06-15 RX ADMIN — DIPHENHYDRAMINE HCL 25 MG: 25 TABLET ORAL at 21:14

## 2021-06-15 RX ADMIN — GABAPENTIN 600 MG: 300 CAPSULE ORAL at 08:58

## 2021-06-15 RX ADMIN — LIDOCAINE 1 PATCH: 50 PATCH CUTANEOUS at 08:58

## 2021-06-15 RX ADMIN — FENOFIBRATE 145 MG: 145 TABLET ORAL at 08:58

## 2021-06-15 RX ADMIN — PANTOPRAZOLE SODIUM 40 MG: 40 TABLET, DELAYED RELEASE ORAL at 06:35

## 2021-06-15 RX ADMIN — GABAPENTIN 600 MG: 300 CAPSULE ORAL at 21:13

## 2021-06-15 NOTE — PLAN OF CARE
Problem: Nutrition/Hydration-ADULT  Goal: Nutrient/Hydration intake appropriate for improving, restoring or maintaining nutritional needs  Description: Monitor and assess patient's nutrition/hydration status for malnutrition  Collaborate with interdisciplinary team and initiate plan and interventions as ordered  Monitor patient's weight and dietary intake as ordered or per policy  Utilize nutrition screening tool and intervene as necessary  Determine patient's food preferences and provide high-protein, high-caloric foods as appropriate       INTERVENTIONS:  - Monitor oral intake, urinary output, labs, and treatment plans  - Assess nutrition and hydration status and recommend course of action  - Evaluate amount of meals eaten  - Assist patient with eating if necessary   - Allow adequate time for meals  - Recommend/ encourage appropriate diets, oral nutritional supplements, and vitamin/mineral supplements  - Order, calculate, and assess calorie counts as needed  - Recommend, monitor, and adjust tube feedings and TPN/PPN based on assessed needs  - Assess need for intravenous fluids  - Provide specific nutrition/hydration education as appropriate  - Include patient/family/caregiver in decisions related to nutrition  Outcome: Progressing     Problem: Alteration in Thoughts and Perception  Goal: Treatment Goal: Gain control of psychotic behaviors/thinking, reduce/eliminate presenting symptoms and demonstrate improved reality functioning upon discharge  Outcome: Progressing  Goal: Verbalize thoughts and feelings  Description: Interventions:  - Promote a nonjudgmental and trusting relationship with the patient through active listening and therapeutic communication  - Assess patient's level of functioning, behavior and potential for risk  - Engage patient in 1 on 1 interactions  - Encourage patient to express fears, feelings, frustrations, and discuss symptoms    - [place] patient to reality, help patient recognize reality-based thinking   - Administer medications as ordered and assess for potential side effects  - Provide the patient education related to the signs and symptoms of the illness and desired effects of prescribed medications  Outcome: Progressing  Goal: Refrain from acting on delusional thinking/internal stimuli  Description: Interventions:  - Monitor patient closely, per order   - Utilize least restrictive measures   - Set reasonable limits, give positive feedback for acceptable   - Administer medications as ordered and monitor of potential side effects  Outcome: Progressing  Goal: Agree to be compliant with medication regime, as prescribed and report medication side effects  Description: Interventions:  - Offer appropriate PRN medication and supervise ingestion; conduct AIMS, as needed   Outcome: Progressing  Goal: Attend and participate in unit activities, including therapeutic, recreational, and educational groups  Description: Interventions:  -Encourage Visitation and family involvement in care  Outcome: Progressing  Goal: Recognize dysfunctional thoughts, communicate reality-based thoughts at the time of discharge  Description: Interventions:  - Provide medication and psycho-education to assist patient in compliance and developing insight into his/her illness   Outcome: Progressing  Goal: Complete daily ADLs, including personal hygiene independently, as able  Description: Interventions:  - Observe, teach, and assist patient with ADLS  - Monitor and promote a balance of rest/activity, with adequate nutrition and elimination   Outcome: Progressing     Problem: Ineffective Coping  Goal: Cooperates with admission process  Description: Interventions:   - Complete admission process  Outcome: Progressing  Goal: Identifies ineffective coping skills  Outcome: Progressing  Goal: Identifies healthy coping skills  Outcome: Progressing  Goal: Demonstrates healthy coping skills  Outcome: Progressing  Goal: Participates in unit activities  Description: Interventions:  - Provide therapeutic environment   - Provide required programming   - Redirect inappropriate behaviors   Outcome: Progressing  Goal: Patient/Family participate in treatment and DC plans  Description: Interventions:  - Provide therapeutic environment  Outcome: Progressing  Goal: Patient/Family verbalizes awareness of resources  Outcome: Progressing  Goal: Understands least restrictive measures  Description: Interventions:  - Utilize least restrictive behavior  Outcome: Progressing  Goal: Free from restraint events  Description: - Utilize least restrictive measures   - Provide behavioral interventions   - Redirect inappropriate behaviors   Outcome: Progressing     Problem: Risk for Self Injury/Neglect  Goal: Treatment Goal: Remain safe during length of stay, learn and adopt new coping skills, and be free of self-injurious ideation, impulses and acts at the time of discharge  Outcome: Progressing  Goal: Verbalize thoughts and feelings  Description: Interventions:  - Assess and re-assess patient's lethality and potential for self-injury  - Engage patient in 1:1 interactions, daily, for a minimum of 15 minutes  - Encourage patient to express feelings, fears, frustrations, hopes  - Establish rapport/trust with patient   Outcome: Progressing  Goal: Refrain from harming self  Description: Interventions:  - Monitor patient closely, per order  - Develop a trusting relationship  - Supervise medication ingestion, monitor effects and side effects   Outcome: Progressing  Goal: Attend and participate in unit activities, including therapeutic, recreational, and educational groups  Description: Interventions:  - Provide therapeutic and educational activities daily, encourage attendance and participation, and document same in the medical record  - Obtain collateral information, encourage visitation and family involvement in care   Outcome: Progressing  Goal: Recognize maladaptive responses and adopt new coping mechanisms  Outcome: Progressing  Goal: Complete daily ADLs, including personal hygiene independently, as able  Description: Interventions:  - Observe, teach, and assist patient with ADLS  - Monitor and promote a balance of rest/activity, with adequate nutrition and elimination  Outcome: Progressing     Problem: Depression  Goal: Treatment Goal: Demonstrate behavioral control of depressive symptoms, verbalize feelings of improved mood/affect, and adopt new coping skills prior to discharge  Outcome: Progressing  Goal: Verbalize thoughts and feelings  Description: Interventions:  - Assess and re-assess patient's level of risk   - Engage patient in 1:1 interactions, daily, for a minimum of 15 minutes   - Encourage patient to express feelings, fears, frustrations, hopes   Outcome: Progressing  Goal: Refrain from harming self  Description: Interventions:  - Monitor patient closely, per order   - Supervise medication ingestion, monitor effects and side effects   Outcome: Progressing  Goal: Refrain from isolation  Description: Interventions:  - Develop a trusting relationship   - Encourage socialization   Outcome: Progressing  Goal: Refrain from self-neglect  Outcome: Progressing  Goal: Attend and participate in unit activities, including therapeutic, recreational, and educational groups  Description: Interventions:  - Provide therapeutic and educational activities daily, encourage attendance and participation, and document same in the medical record   Outcome: Progressing  Goal: Complete daily ADLs, including personal hygiene independently, as able  Description: Interventions:  - Observe, teach, and assist patient with ADLS  -  Monitor and promote a balance of rest/activity, with adequate nutrition and elimination   Outcome: Progressing     Problem: Anxiety  Goal: Anxiety is at manageable level  Description: Interventions:  - Assess and monitor patient's anxiety level     - Monitor for signs and symptoms (heart palpitations, chest pain, shortness of breath, headaches, nausea, feeling jumpy, restlessness, irritable, apprehensive)  - Collaborate with interdisciplinary team and initiate plan and interventions as ordered    - [place] patient to unit/surroundings  - Explain treatment plan  - Encourage participation in care  - Encourage verbalization of concerns/fears  - Identify coping mechanisms  - Assist in developing anxiety-reducing skills  - Administer/offer alternative therapies  - Limit or eliminate stimulants  Outcome: Progressing     Problem: Risk for Violence/Aggression Toward Others  Goal: Treatment Goal: Refrain from acts of violence/aggression during length of stay, and demonstrate improved impulse control at the time of discharge  Outcome: Progressing  Goal: Verbalize thoughts and feelings  Description: Interventions:  - Assess and re-assess patient's level of risk, every waking shift  - Engage patient in 1:1 interactions, daily, for a minimum of 15 minutes   - Allow patient to express feelings and frustrations in a safe and non-threatening manner   - Establish rapport/trust with patient   Outcome: Progressing  Goal: Refrain from harming others  Outcome: Progressing  Goal: Refrain from destructive acts on the environment or property  Outcome: Progressing  Goal: Control angry outbursts  Description: Interventions:  - Monitor patient closely, per order  - Ensure early verbal de-escalation  - Monitor prn medication needs  - Set reasonable/therapeutic limits, outline behavioral expectations, and consequences   - Provide a non-threatening milieu, utilizing the least restrictive interventions   Outcome: Progressing  Goal: Attend and participate in unit activities, including therapeutic, recreational, and educational groups  Description: Interventions:  - Provide therapeutic and educational activities daily, encourage attendance and participation, and document same in the medical record   Outcome: Progressing  Goal: Identify appropriate positive anger management techniques  Description: Interventions:  - Offer anger management and coping skills groups   - Staff will provide positive feedback for appropriate anger control  Outcome: Progressing     Problem: Alteration in Orientation  Goal: Treatment Goal: Demonstrate a reduction of confusion and improved orientation to person, place, time and/or situation upon discharge, according to optimum baseline/ability  Outcome: Progressing  Goal: Interact with staff daily  Description: Interventions:  - Assess and re-assess patient's level of orientation  - Engage patient in 1 on 1 interactions, daily, for a minimum of 15 minutes   - Establish rapport/trust with patient   Outcome: Progressing  Goal: Express concerns related to confused thinking related to:  Description: Interventions:  - Encourage patient to express feelings, fears, frustrations, hopes  - Assign consistent caregivers   - [place]/re-orient patient as needed  - Allow comfort items, as appropriate  - Provide visual cues, signs, etc    Outcome: Progressing  Goal: Allow medical examinations, as recommended  Description: Interventions:  - Provide physical/neurological exams and/or referrals, per provider   Outcome: Progressing  Goal: Cooperate with recommended testing/procedures  Description: Interventions:  - Determine need for ancillary testing  - Observe for mental status changes  - Implement falls/precaution protocol   Outcome: Progressing  Goal: Attend and participate in unit activities, including therapeutic, recreational, and educational groups  Description: Interventions:  - Provide therapeutic and educational activities daily, encourage attendance and participation, and document same in the medical record   - Provide appropriate opportunities for reminiscence   - Provide a consistent daily routine   - Encourage family contact/visitation   Outcome: Progressing  Goal: Complete daily ADLs, including personal hygiene independently, as able  Description: Interventions:  - Observe, teach, and assist patient with ADLS  Outcome: Progressing     Problem: Individualized Interventions  Goal: Patient will verbalize appropriate use of telephone within 5 days  Description: Interventions:  - Treatment team to determine use of supervised phone privileges   Outcome: Progressing  Goal: Patient will verbalize need for hospitalization and will no longer attempt elopement within 5 days  Description: Interventions:  - Ongoing education to help patient understand need for hospitalization  Outcome: Progressing  Goal: Patient will recognize inappropriate behaviors and develop alternative behaviors within 5 days  Description: Interventions:  - Patient in collaboration with Treatment Team will develop a behavior management plan to help identify effective coping skills to deal with stressors  Outcome: Progressing     Problem: DISCHARGE PLANNING - CARE MANAGEMENT  Goal: Discharge to post-acute care or home with appropriate resources  Description: INTERVENTIONS:  - Conduct assessment to determine patient/family and health care team treatment goals, and need for post-acute services based on payer coverage, community resources, and patient preferences, and barriers to discharge  - Address psychosocial, clinical, and financial barriers to discharge as identified in assessment in conjunction with the patient/family and health care team  - Arrange appropriate level of post-acute services according to patients   needs and preference and payer coverage in collaboration with the physician and health care team  - Communicate with and update the patient/family, physician, and health care team regarding progress on the discharge plan  - Arrange appropriate transportation to post-acute venues  Outcome: Progressing

## 2021-06-15 NOTE — PLAN OF CARE
Problem: Alteration in Thoughts and Perception  Goal: Treatment Goal: Gain control of psychotic behaviors/thinking, reduce/eliminate presenting symptoms and demonstrate improved reality functioning upon discharge  Outcome: Progressing  Goal: Verbalize thoughts and feelings  Description: Interventions:  - Promote a nonjudgmental and trusting relationship with the patient through active listening and therapeutic communication  - Assess patient's level of functioning, behavior and potential for risk  - Engage patient in 1 on 1 interactions  - Encourage patient to express fears, feelings, frustrations, and discuss symptoms    - [place] patient to reality, help patient recognize reality-based thinking   - Administer medications as ordered and assess for potential side effects  - Provide the patient education related to the signs and symptoms of the illness and desired effects of prescribed medications  Outcome: Progressing  Goal: Refrain from acting on delusional thinking/internal stimuli  Description: Interventions:  - Monitor patient closely, per order   - Utilize least restrictive measures   - Set reasonable limits, give positive feedback for acceptable   - Administer medications as ordered and monitor of potential side effects  Outcome: Not Progressing  Goal: Agree to be compliant with medication regime, as prescribed and report medication side effects  Description: Interventions:  - Offer appropriate PRN medication and supervise ingestion; conduct AIMS, as needed   Outcome: Progressing  Goal: Attend and participate in unit activities, including therapeutic, recreational, and educational groups  Description: Interventions:  -Encourage Visitation and family involvement in care  Outcome: Progressing  Goal: Recognize dysfunctional thoughts, communicate reality-based thoughts at the time of discharge  Description: Interventions:  - Provide medication and psycho-education to assist patient in compliance and developing insight into his/her illness   Outcome: Not Progressing  Goal: Complete daily ADLs, including personal hygiene independently, as able  Description: Interventions:  - Observe, teach, and assist patient with ADLS  - Monitor and promote a balance of rest/activity, with adequate nutrition and elimination   Outcome: Progressing     Problem: Ineffective Coping  Goal: Cooperates with admission process  Description: Interventions:   - Complete admission process  Outcome: Progressing  Goal: Identifies ineffective coping skills  Outcome: Progressing  Goal: Identifies healthy coping skills  Outcome: Progressing  Goal: Demonstrates healthy coping skills  Outcome: Progressing  Goal: Participates in unit activities  Description: Interventions:  - Provide therapeutic environment   - Provide required programming   - Redirect inappropriate behaviors   Outcome: Progressing  Goal: Patient/Family participate in treatment and DC plans  Description: Interventions:  - Provide therapeutic environment  Outcome: Progressing  Goal: Patient/Family verbalizes awareness of resources  Outcome: Progressing  Goal: Understands least restrictive measures  Description: Interventions:  - Utilize least restrictive behavior  Outcome: Progressing  Goal: Free from restraint events  Description: - Utilize least restrictive measures   - Provide behavioral interventions   - Redirect inappropriate behaviors   Outcome: Progressing     Problem: Risk for Self Injury/Neglect  Goal: Treatment Goal: Remain safe during length of stay, learn and adopt new coping skills, and be free of self-injurious ideation, impulses and acts at the time of discharge  Outcome: Not Progressing  Goal: Verbalize thoughts and feelings  Description: Interventions:  - Assess and re-assess patient's lethality and potential for self-injury  - Engage patient in 1:1 interactions, daily, for a minimum of 15 minutes  - Encourage patient to express feelings, fears, frustrations, hopes  - Establish rapport/trust with patient   Outcome: Progressing  Goal: Refrain from harming self  Description: Interventions:  - Monitor patient closely, per order  - Develop a trusting relationship  - Supervise medication ingestion, monitor effects and side effects   Outcome: Progressing  Goal: Attend and participate in unit activities, including therapeutic, recreational, and educational groups  Description: Interventions:  - Provide therapeutic and educational activities daily, encourage attendance and participation, and document same in the medical record  - Obtain collateral information, encourage visitation and family involvement in care   Outcome: Progressing  Goal: Recognize maladaptive responses and adopt new coping mechanisms  Outcome: Not Progressing  Goal: Complete daily ADLs, including personal hygiene independently, as able  Description: Interventions:  - Observe, teach, and assist patient with ADLS  - Monitor and promote a balance of rest/activity, with adequate nutrition and elimination  Outcome: Progressing     Problem: Depression  Goal: Treatment Goal: Demonstrate behavioral control of depressive symptoms, verbalize feelings of improved mood/affect, and adopt new coping skills prior to discharge  Outcome: Not Progressing  Goal: Verbalize thoughts and feelings  Description: Interventions:  - Assess and re-assess patient's level of risk   - Engage patient in 1:1 interactions, daily, for a minimum of 15 minutes   - Encourage patient to express feelings, fears, frustrations, hopes   Outcome: Progressing  Goal: Refrain from harming self  Description: Interventions:  - Monitor patient closely, per order   - Supervise medication ingestion, monitor effects and side effects   Outcome: Progressing  Goal: Refrain from isolation  Description: Interventions:  - Develop a trusting relationship   - Encourage socialization   Outcome: Progressing  Goal: Refrain from self-neglect  Outcome: Progressing  Goal: Attend and participate in unit activities, including therapeutic, recreational, and educational groups  Description: Interventions:  - Provide therapeutic and educational activities daily, encourage attendance and participation, and document same in the medical record   Outcome: Progressing  Goal: Complete daily ADLs, including personal hygiene independently, as able  Description: Interventions:  - Observe, teach, and assist patient with ADLS  -  Monitor and promote a balance of rest/activity, with adequate nutrition and elimination   Outcome: Progressing     Problem: Anxiety  Goal: Anxiety is at manageable level  Description: Interventions:  - Assess and monitor patient's anxiety level  - Monitor for signs and symptoms (heart palpitations, chest pain, shortness of breath, headaches, nausea, feeling jumpy, restlessness, irritable, apprehensive)  - Collaborate with interdisciplinary team and initiate plan and interventions as ordered    - [place] patient to unit/surroundings  - Explain treatment plan  - Encourage participation in care  - Encourage verbalization of concerns/fears  - Identify coping mechanisms  - Assist in developing anxiety-reducing skills  - Administer/offer alternative therapies  - Limit or eliminate stimulants  Outcome: Progressing     Problem: Risk for Violence/Aggression Toward Others  Goal: Treatment Goal: Refrain from acts of violence/aggression during length of stay, and demonstrate improved impulse control at the time of discharge  Outcome: Progressing  Goal: Verbalize thoughts and feelings  Description: Interventions:  - Assess and re-assess patient's level of risk, every waking shift  - Engage patient in 1:1 interactions, daily, for a minimum of 15 minutes   - Allow patient to express feelings and frustrations in a safe and non-threatening manner   - Establish rapport/trust with patient   Outcome: Progressing  Goal: Refrain from harming others  Outcome: Progressing  Goal: Refrain from destructive acts on the environment or property  Outcome: Progressing  Goal: Control angry outbursts  Description: Interventions:  - Monitor patient closely, per order  - Ensure early verbal de-escalation  - Monitor prn medication needs  - Set reasonable/therapeutic limits, outline behavioral expectations, and consequences   - Provide a non-threatening milieu, utilizing the least restrictive interventions   Outcome: Not Progressing  Goal: Attend and participate in unit activities, including therapeutic, recreational, and educational groups  Description: Interventions:  - Provide therapeutic and educational activities daily, encourage attendance and participation, and document same in the medical record   Outcome: Progressing  Goal: Identify appropriate positive anger management techniques  Description: Interventions:  - Offer anger management and coping skills groups   - Staff will provide positive feedback for appropriate anger control  Outcome: Not Progressing     Problem: Alteration in Orientation  Goal: Treatment Goal: Demonstrate a reduction of confusion and improved orientation to person, place, time and/or situation upon discharge, according to optimum baseline/ability  Outcome: Progressing  Goal: Interact with staff daily  Description: Interventions:  - Assess and re-assess patient's level of orientation  - Engage patient in 1 on 1 interactions, daily, for a minimum of 15 minutes   - Establish rapport/trust with patient   Outcome: Progressing  Goal: Express concerns related to confused thinking related to:  Description: Interventions:  - Encourage patient to express feelings, fears, frustrations, hopes  - Assign consistent caregivers   - [place]/re-orient patient as needed  - Allow comfort items, as appropriate  - Provide visual cues, signs, etc    Outcome: Not Progressing  Goal: Allow medical examinations, as recommended  Description: Interventions:  - Provide physical/neurological exams and/or referrals, per provider Outcome: Progressing  Goal: Cooperate with recommended testing/procedures  Description: Interventions:  - Determine need for ancillary testing  - Observe for mental status changes  - Implement falls/precaution protocol   Outcome: Progressing  Goal: Attend and participate in unit activities, including therapeutic, recreational, and educational groups  Description: Interventions:  - Provide therapeutic and educational activities daily, encourage attendance and participation, and document same in the medical record   - Provide appropriate opportunities for reminiscence   - Provide a consistent daily routine   - Encourage family contact/visitation   Outcome: Progressing  Goal: Complete daily ADLs, including personal hygiene independently, as able  Description: Interventions:  - Observe, teach, and assist patient with ADLS  Outcome: Progressing     Problem: Individualized Interventions  Goal: Patient will verbalize appropriate use of telephone within 5 days  Description: Interventions:  - Treatment team to determine use of supervised phone privileges   Outcome: Progressing  Goal: Patient will verbalize need for hospitalization and will no longer attempt elopement within 5 days  Description: Interventions:  - Ongoing education to help patient understand need for hospitalization  Outcome: Progressing  Goal: Patient will recognize inappropriate behaviors and develop alternative behaviors within 5 days  Description: Interventions:  - Patient in collaboration with Treatment Team will develop a behavior management plan to help identify effective coping skills to deal with stressors  Outcome: Progressing

## 2021-06-15 NOTE — PROGRESS NOTES
Psychiatry Progress Note [facility] 49 y o  female MRN: [id number]  Unit/Bed#: [facility] 432-77 Encounter: [id number]  Code Status: Level 1 - Full Code    PCP: Slime Valdez DO    Date of Admission:  4/7/2021 1435   Date of Service:  06/15/21    Patient Active Problem List   Diagnosis    Schizoaffective disorder, bipolar type ([facility] )    Uncomplicated alcohol dependence ([facility] )    Suicidal behavior    LYNN (generalized anxiety disorder)    Slow transit constipation    Deficiency of vitamin B    Degenerative disc disease, lumbar    Post-traumatic stress disorder, chronic    Lower extremity weakness    Generalized abdominal pain    Non-intractable vomiting    Medical clearance for psychiatric admission    Carbuncle    Alcohol dependence with unspecified alcohol-induced disorder ([facility] )    Mild intermittent asthma without complication    Tobacco abuse    Ear problem, bilateral    Gastroesophageal reflux disease    Right foot pain    Ear problem, right     Review of systems:         Unremarkable  Diagnosis:             Schizoaffective bipolar, alcohol use disorder in early remission in controlled environment  Assessment   Overall Status:          Was yelling in her room and in the [address] and went to the quiet room last night for some time to regain control for about 15 mts   Certification Statement: The patient will continue to require additional inpatient hospital stay due to ongoing mood swings paranoia   Acceptance by patient:  accepting    Hopefulness in recovery:  Living in a group home   Understanding of medications: has good understanding   Involved in reintegration process:  Adjusting to the unit   Trusting in relationship with psychiatrist:  trusting   Justification for dual anti-psychotics: due to lack of response to single antipsychotics   Side effects from treatment: none    Medication changes   None  today  Medication Education;  Risks and S/E and precautions of all meds given to patient and she did verbalize an understanding   Non-pharmacological treatments   Continue with individual, group, milieu and occupational therapy using recovery principles and psycho-education about accepting illness and the need for treatment  Safety   Safety and communication plan established to target dynamic risk factors discussed above  Discharge Plan     most likely to a group home with the act team again     Interval Progress       Was yelling and screaming for brief periods and agreed to going to the quiet room for sometime last night to regain her controls  She continues to believe that no one has helped her in controlling the voices which has been happening for years  Still delusional about someone stalking her and living upstairs on the floor above and talking to her through the vents on the ceiling  Continues to believe people on the unit are hipnotizing her  Not aggressive, threatening or self-abusive and does attend groups but becomes demanding and preoccupied off and on,   Sleep:                    good   appetite:                 good   compliance with medications :         compliant   Side  Effects:                              And reported   significant events:                     Still screaming and yelling and delusional   group attendance:              Attending most of the groups 4/5    Mental Status Exam  Appearance: age appropriate, casually dressed, looks older than stated age, underweight    Attended team meeting today   Behavior: normal, pleasant, cooperative, mildly anxious,  more friendly today and not confrontational     Speech: normal pitch, fluent, clear, coherent, increased rate, hypertalkative,   Circumstantial pressured    Mood: depressed, anxious, euphoric  Affect: labile, reactive, slightly brighter  Anxious at times  Thought Process: normal abstract reasoning, less prominent, tends to be pressured and circumstantial and preoccupied perseverating on discharge    Thought Content: some paranoia, ideas of reference, but does appear as if paranoid  No current suicidal homicidal thoughts intent or plans verbalized  No phobias obsessions compulsions or distorted body perceptions elicited  Also believes TV is sending her messages from movie stars checking her if she is good or bad and threatening to get the feds to go after her  again talks about a woman or a  man stalking her from upstairs and talking to her through the vents    Perceptual Disturbances: no auditory hallucinations, no visual hallucinations, denies auditory hallucinations when asked, does not appear responding to internal stimuli, no voices reported today       Hx Risk Factors: chronic mood disorder, chronic psychotic symptoms, alcohol use, limted insight  Sensorium:      Oriented x spheres and to situation   Cognition: recent and remote memory grossly intact  Consciousness: alert and awake  Attention: attention span and concentration are age appropriate  Intellect: appears to be of average intelligence  Insight: improving  Judgement: improving  Motor Activity: no abnormal movements     Vitals  Temp:  [97 4 °F (36 3 °C)-97 5 °F (36 4 °C)] 97 4 °F (36 3 °C)  HR:  [] 92  Resp:  [16-20] 16  BP: (113-129)/(61-77) 129/77  No intake or output data in the 24 hours ending 06/15/21 3944    Lab Results:  [facility] Admission Reviewed     Current Facility-Administered Medications   Medication Dose Route Frequency Provider Last Rate    acetaminophen  650 mg Oral Q6H PRN Padmini Neslon MD      acetaminophen  650 mg Oral Q4H PRN Padmini Nelson MD      acetaminophen  975 mg Oral Q6H PRN Padmini Nelson MD      al mag oxide-diphenhydramine-lidocaine viscous  10 mL Swish & Swallow Q4H PRN Padmini Nelson MD      albuterol  2 puff Inhalation Q6H PRN Padmini Nelson MD      aluminum-magnesium hydroxide-simethicone  15 mL Oral Q4H PRN Padmini Nelson MD      benztropine  1 mg Intramuscular Q4H PRN Max 6/day Shefali , MD      benztropine  1 mg Oral Q4H PRN Max 6/day Shefali , MD      benztropine  1 mg Oral BID Shefali , MD      calcium carbonate  500 mg Oral TID PRN Shefali , MD      Diclofenac Sodium  2 g Topical 4x Daily PRN Shefali , MD      diphenhydrAMINE  25 mg Oral HS Shefali , MD      fenofibrate  145 mg Oral Daily Pollock , MD      gabapentin  600 mg Oral 4x Daily Pollock , MD      haloperidol  1 mg Oral 4x Daily Pollock , MD      haloperidol  5 mg Oral Q6H PRN Shefali , MD      hydrOXYzine HCL  25 mg Oral Q6H PRN Max 4/day Shefali , MD      hydrOXYzine HCL  50 mg Oral Q6H PRN Max 4/day Pollock , MD      lidocaine  1 patch Topical Daily Shefali , MD      LORazepam  1 mg Intramuscular Q6H PRN Shefali , MD      LORazepam  0 5 mg Oral 4x Daily Shefali , MD      magnesium hydroxide  30 mL Oral Daily PRN Shefali , MD      methocarbamol  500 mg Oral Q8H PRN Shefali , MD      nicotine  1 patch Transdermal Daily Shefali , MD      nicotine polacrilex  4 mg Oral Q2H PRN Shefali , MD      OLANZapine  2 5 mg Oral Q8H PRN Shefali , MD      OLANZapine  5 mg Oral Q3H PRN Shefali , MD      OLANZapine  7 5 mg Oral Q3H PRN Shefali , MD      OLANZapine  10 mg Intramuscular Q3H PRN Shefali , MD      OLANZapine  5 mg Intramuscular Q3H PRN Shefali , MD      OLANZapine  10 mg Oral HS Shefali , MD      OLANZapine  15 mg Oral Daily Shefali , MD      paliperidone palmitate ER  234 mg Intramuscular Q30 Days Shefali , MD      pantoprazole  40 mg Oral Early Morning Shefali , MD      Pramox-PE-Glycerin-Petrolatum  1 application Rectal 4x Daily PRN Shefali , MD      prazosin  1 mg Oral HS Shefali , MD      psyllium  1 packet Oral Daily Shefali , MD      sertraline  100 mg Oral Daily Shefali , MD         Counseling / Coordination of Care: Total floor / unit time spent today 15 minutes  Greater than 50% of total time was spent with the patient and / or family counseling and / or somewhat receptive to supportive listening and teaching positive coping skills to deal with symptom mangement  Patient's Rights, confidentiality and exceptions to confidentiality, use of automated medical record, [facility] staff access to medical record, and consent to treatment reviewed  This note was  not shared with the patient because it might further aggravate her psychiatric condition  This note has been dictated

## 2021-06-15 NOTE — PROGRESS NOTES
06/15/21 1022   Team Meeting   Meeting Type Tx Team Meeting   Initial Conference Date 06/15/21   Team Members Present   Team Members Present Physician;   Physician Team Member Dr Alex Jimenez Management Team Member Jonelle Lechuga   Patient/Family Present   Patient Present Yes   Patient's Family Present No     Pt attended treatment team meeting with completed self-assessment  Barriers: going to groups and managing MH  Coping skill: walking  Smart goal: managing MH  Pt stated that she was a little unclear about what a smart goal is but has written info about it that she will review in preparation for the next tx meeting  Pt has been med compliant and not experiencing any adverse side effects  Pt reports AH that sounds like her mother  Pt also reports hearing voices through the vent occasionally  Pt reports having a bad day yesterday -- feeling anxious and angry with regards to her AH  She felt like she needed time in the quiet room to calm down and this was effective  80% group attendance last week  Good hygiene

## 2021-06-15 NOTE — NURSING NOTE
Patient is visible and for the most part, calm  She still has an irritable edge but no major altercations; she had one issue with screaming saying she was being hypnotized and she does admit to hearing voices in the floor above hers through the vent   Writer said that we would have to write her screamig in the notes for her and she stopped and there were no further episodes of irrationality

## 2021-06-15 NOTE — NURSING NOTE
Patient has had no behavioral issues this morning and has been in behavioral control  Patient is calm, quiet, and cooperative  Patient states she is feeling "much better" and requests discharge  Patient educated on discharge process/discussing with doctor

## 2021-06-15 NOTE — NURSING NOTE
Patient appeared to sleep for long intervals  did not  get up  during the night  Will continue to monitor and provide therapeutic support

## 2021-06-15 NOTE — NURSING NOTE
Visible most of shift but isolative to self  Irritable edge noted most of shift and when using phone  Med and meal compliant  Did not attend wrap up group  Had 1 episode of yelling in [employer] and able to be redirected   No further issues noted

## 2021-06-15 NOTE — NURSING NOTE
Pt yelling in room at this time,  when asked her what is happening all she says is it has been going on for years and no one has fixed it     Requested to go to quiet room for time alone where she continues to yell at this time

## 2021-06-15 NOTE — CASE MANAGEMENT
06/15/21 0900   Team Meeting   Meeting Type Daily Rounds   Initial Conference Date 06/15/21   Team Members Present   Team Members Present Physician;Nurse;; Other (Discipline and Name)   Physician Team Member Dr Brannon Fortune Team Member Andreas Suggs Management Team Member Nelli Marcelo   Other (Discipline and Name) Juan Kahn   Patient/Family Present   Patient Present No   Patient's Family Present No     Case reviewed: yelling x2 last evening -- went into the quiet room to calm down, social, med compliant, irritable, slept, 4/5 groups

## 2021-06-16 PROCEDURE — 99232 SBSQ HOSP IP/OBS MODERATE 35: CPT | Performed by: PSYCHIATRY & NEUROLOGY

## 2021-06-16 RX ADMIN — SERTRALINE HYDROCHLORIDE 100 MG: 100 TABLET ORAL at 09:15

## 2021-06-16 RX ADMIN — LORAZEPAM 0.5 MG: 0.5 TABLET ORAL at 11:45

## 2021-06-16 RX ADMIN — OLANZAPINE 10 MG: 10 TABLET, FILM COATED ORAL at 21:03

## 2021-06-16 RX ADMIN — HALOPERIDOL 1 MG: 1 TABLET ORAL at 21:03

## 2021-06-16 RX ADMIN — GABAPENTIN 600 MG: 300 CAPSULE ORAL at 17:08

## 2021-06-16 RX ADMIN — LORAZEPAM 0.5 MG: 0.5 TABLET ORAL at 09:15

## 2021-06-16 RX ADMIN — PHENYTOIN 1 MG: 125 SUSPENSION ORAL at 21:03

## 2021-06-16 RX ADMIN — DIPHENHYDRAMINE HCL 25 MG: 25 TABLET ORAL at 21:03

## 2021-06-16 RX ADMIN — LORAZEPAM 0.5 MG: 0.5 TABLET ORAL at 21:03

## 2021-06-16 RX ADMIN — GABAPENTIN 600 MG: 300 CAPSULE ORAL at 21:03

## 2021-06-16 RX ADMIN — LIDOCAINE 1 PATCH: 50 PATCH CUTANEOUS at 09:14

## 2021-06-16 RX ADMIN — HALOPERIDOL 1 MG: 1 TABLET ORAL at 17:08

## 2021-06-16 RX ADMIN — GABAPENTIN 600 MG: 300 CAPSULE ORAL at 11:45

## 2021-06-16 RX ADMIN — HALOPERIDOL 1 MG: 1 TABLET ORAL at 09:15

## 2021-06-16 RX ADMIN — OLANZAPINE 15 MG: 5 TABLET, FILM COATED ORAL at 09:14

## 2021-06-16 RX ADMIN — LORAZEPAM 0.5 MG: 0.5 TABLET ORAL at 17:08

## 2021-06-16 RX ADMIN — PANTOPRAZOLE SODIUM 40 MG: 40 TABLET, DELAYED RELEASE ORAL at 05:55

## 2021-06-16 RX ADMIN — FENOFIBRATE 145 MG: 145 TABLET ORAL at 09:14

## 2021-06-16 RX ADMIN — GABAPENTIN 600 MG: 300 CAPSULE ORAL at 09:14

## 2021-06-16 RX ADMIN — HALOPERIDOL 1 MG: 1 TABLET ORAL at 11:45

## 2021-06-16 RX ADMIN — METHOCARBAMOL TABLETS 500 MG: 500 TABLET, COATED ORAL at 19:16

## 2021-06-16 RX ADMIN — BENZTROPINE MESYLATE 1 MG: 1 TABLET ORAL at 09:15

## 2021-06-16 RX ADMIN — PSYLLIUM HUSK 1 PACKET: 3.4 POWDER ORAL at 09:14

## 2021-06-16 RX ADMIN — NICOTINE 1 PATCH: 21 PATCH, EXTENDED RELEASE TRANSDERMAL at 09:17

## 2021-06-16 RX ADMIN — BENZTROPINE MESYLATE 1 MG: 1 TABLET ORAL at 17:08

## 2021-06-16 NOTE — CASE MANAGEMENT
Spoke with pt regarding d/c plan and disposition  Pt wants to know how much longer it'll be until a [facility] bed becomes available  She also inquired about getting her own apt/rented room instead of waiting for [facility] bed  Lastly, pt inquired about getting a day pass to go to the bank and get 2 money orders for her sister in order pay bills  CM will follow up with tx team     Left  for Rossy Esteves @[facility]/ANDREA [phone] to get update on placement

## 2021-06-16 NOTE — NURSING NOTE
Patient came to nurses station wanting to know if I charted her behavior and actions  Patient seems concerned about the way she is portrayed in her chart

## 2021-06-16 NOTE — NURSING NOTE
Patient was yelling "BITCH" in her room  Spoke with patient about her behavior and how her abruptly screaming is disruptive to the unit  Gave patient her 12:00 pm meds and also explained I would need to chart her behavior  Patient wanted to know why and again I explained about her behavior  Patient verbalized understanding

## 2021-06-16 NOTE — PLAN OF CARE
Problem: Nutrition/Hydration-ADULT  Goal: Nutrient/Hydration intake appropriate for improving, restoring or maintaining nutritional needs  Description: Monitor and assess patient's nutrition/hydration status for malnutrition  Collaborate with interdisciplinary team and initiate plan and interventions as ordered  Monitor patient's weight and dietary intake as ordered or per policy  Utilize nutrition screening tool and intervene as necessary  Determine patient's food preferences and provide high-protein, high-caloric foods as appropriate       INTERVENTIONS:  - Monitor oral intake, urinary output, labs, and treatment plans  - Assess nutrition and hydration status and recommend course of action  - Evaluate amount of meals eaten  - Assist patient with eating if necessary   - Allow adequate time for meals  - Recommend/ encourage appropriate diets, oral nutritional supplements, and vitamin/mineral supplements  - Order, calculate, and assess calorie counts as needed  - Recommend, monitor, and adjust tube feedings and TPN/PPN based on assessed needs  - Assess need for intravenous fluids  - Provide specific nutrition/hydration education as appropriate  - Include patient/family/caregiver in decisions related to nutrition  6/16/2021 1057 by Miriam Mari RN  Outcome: Progressing  6/16/2021 1027 by Miriam Mari RN  Outcome: Progressing     Problem: Alteration in Thoughts and Perception  Goal: Treatment Goal: Gain control of psychotic behaviors/thinking, reduce/eliminate presenting symptoms and demonstrate improved reality functioning upon discharge  6/16/2021 1057 by Miriam Mari RN  Outcome: Progressing  6/16/2021 1027 by Miriam Mari RN  Outcome: Progressing  Goal: Verbalize thoughts and feelings  Description: Interventions:  - Promote a nonjudgmental and trusting relationship with the patient through active listening and therapeutic communication  - Assess patient's level of functioning, behavior and potential for risk  - Engage patient in 1 on 1 interactions  - Encourage patient to express fears, feelings, frustrations, and discuss symptoms    - [place] patient to reality, help patient recognize reality-based thinking   - Administer medications as ordered and assess for potential side effects  - Provide the patient education related to the signs and symptoms of the illness and desired effects of prescribed medications  6/16/2021 1057 by Eloise Basilio RN  Outcome: Progressing  6/16/2021 1027 by Eloise Basilio RN  Outcome: Progressing  Goal: Refrain from acting on delusional thinking/internal stimuli  Description: Interventions:  - Monitor patient closely, per order   - Utilize least restrictive measures   - Set reasonable limits, give positive feedback for acceptable   - Administer medications as ordered and monitor of potential side effects  6/16/2021 1057 by Eloise Basilio RN  Outcome: Progressing  6/16/2021 1027 by Eloise Basilio RN  Outcome: Not Progressing  Goal: Agree to be compliant with medication regime, as prescribed and report medication side effects  Description: Interventions:  - Offer appropriate PRN medication and supervise ingestion; conduct AIMS, as needed   6/16/2021 1057 by Eloise Basilio RN  Outcome: Progressing  6/16/2021 1027 by Eloise Basilio RN  Outcome: Progressing  Goal: Attend and participate in unit activities, including therapeutic, recreational, and educational groups  Description: Interventions:  -Encourage Visitation and family involvement in care  6/16/2021 1057 by Eloise Basilio RN  Outcome: Progressing  6/16/2021 1027 by Eloise Basilio RN  Outcome: Progressing  Goal: Recognize dysfunctional thoughts, communicate reality-based thoughts at the time of discharge  Description: Interventions:  - Provide medication and psycho-education to assist patient in compliance and developing insight into his/her illness   6/16/2021 1057 by Eloise Basilio RN  Outcome: Progressing  6/16/2021 1027 by Eloise Basilio RN  Outcome: Progressing  Goal: Complete daily ADLs, including personal hygiene independently, as able  Description: Interventions:  - Observe, teach, and assist patient with ADLS  - Monitor and promote a balance of rest/activity, with adequate nutrition and elimination   6/16/2021 1057 by Eloise Basilio RN  Outcome: Progressing  6/16/2021 1027 by Eloise Basilio RN  Outcome: Progressing     Problem: Ineffective Coping  Goal: Cooperates with admission process  Description: Interventions:   - Complete admission process  6/16/2021 1057 by Eloise Basilio RN  Outcome: Progressing  6/16/2021 1027 by Eloise Basilio RN  Outcome: Progressing  Goal: Identifies ineffective coping skills  6/16/2021 1057 by Eloise Basilio RN  Outcome: Progressing  6/16/2021 1027 by Eloise Basilio RN  Outcome: Progressing  Goal: Identifies healthy coping skills  6/16/2021 1057 by Eloise Basilio RN  Outcome: Progressing  6/16/2021 1027 by Eloise Basilio RN  Outcome: Progressing  Goal: Demonstrates healthy coping skills  6/16/2021 1057 by Eloise Basilio RN  Outcome: Progressing  6/16/2021 1027 by Eloise Basilio RN  Outcome: Progressing  Goal: Participates in unit activities  Description: Interventions:  - Provide therapeutic environment   - Provide required programming   - Redirect inappropriate behaviors   6/16/2021 1057 by Eloise Basilio RN  Outcome: Progressing  6/16/2021 1027 by Eloise Basilio RN  Outcome: Progressing  Goal: Patient/Family participate in treatment and DC plans  Description: Interventions:  - Provide therapeutic environment  6/16/2021 1057 by Eliose Basilio RN  Outcome: Progressing  6/16/2021 1027 by Eloise Basilio RN  Outcome: Progressing  Goal: Patient/Family verbalizes awareness of resources  6/16/2021 1057 by Eloise Basilio RN  Outcome: Progressing  6/16/2021 1027 by Eloise Basilio RN  Outcome: Progressing  Goal: Understands least restrictive measures  Description: Interventions:  - Utilize least restrictive behavior  6/16/2021 1057 by Phyllis Flores RN  Outcome: Progressing  6/16/2021 1027 by Phyllis Flores RN  Outcome: Progressing  Goal: Free from restraint events  Description: - Utilize least restrictive measures   - Provide behavioral interventions   - Redirect inappropriate behaviors   6/16/2021 1057 by Phyllis Flores RN  Outcome: Progressing  6/16/2021 1027 by Phyllis Flores RN  Outcome: Progressing     Problem: Risk for Self Injury/Neglect  Goal: Treatment Goal: Remain safe during length of stay, learn and adopt new coping skills, and be free of self-injurious ideation, impulses and acts at the time of discharge  6/16/2021 1057 by Phyllis Flores RN  Outcome: Progressing  6/16/2021 1027 by Phyllis Flores RN  Outcome: Progressing  Goal: Verbalize thoughts and feelings  Description: Interventions:  - Assess and re-assess patient's lethality and potential for self-injury  - Engage patient in 1:1 interactions, daily, for a minimum of 15 minutes  - Encourage patient to express feelings, fears, frustrations, hopes  - Establish rapport/trust with patient   6/16/2021 1057 by Phyllis Flores RN  Outcome: Progressing  6/16/2021 1027 by Phyllis Flores RN  Outcome: Progressing  Goal: Refrain from harming self  Description: Interventions:  - Monitor patient closely, per order  - Develop a trusting relationship  - Supervise medication ingestion, monitor effects and side effects   6/16/2021 1057 by Phyllis Flores RN  Outcome: Progressing  6/16/2021 1027 by Phyllis Flores RN  Outcome: Progressing  Goal: Attend and participate in unit activities, including therapeutic, recreational, and educational groups  Description: Interventions:  - Provide therapeutic and educational activities daily, encourage attendance and participation, and document same in the medical record  - Obtain collateral information, encourage visitation and family involvement in care   6/16/2021 1057 by Jeremiah Dominguez RN  Outcome: Progressing  6/16/2021 1027 by Jeremiah Dominguez RN  Outcome: Progressing  Goal: Recognize maladaptive responses and adopt new coping mechanisms  6/16/2021 1057 by Jeremiah Dominguez RN  Outcome: Progressing  6/16/2021 1027 by Jeremiah Dominguez RN  Outcome: Progressing  Goal: Complete daily ADLs, including personal hygiene independently, as able  Description: Interventions:  - Observe, teach, and assist patient with ADLS  - Monitor and promote a balance of rest/activity, with adequate nutrition and elimination  6/16/2021 1057 by Jeremiah Dominguez RN  Outcome: Progressing  6/16/2021 1027 by Jeremiah Dominguez RN  Outcome: Progressing     Problem: Depression  Goal: Treatment Goal: Demonstrate behavioral control of depressive symptoms, verbalize feelings of improved mood/affect, and adopt new coping skills prior to discharge  6/16/2021 1057 by Jeremiah Dominguez RN  Outcome: Progressing  6/16/2021 1027 by Jeremiah Dominguez RN  Outcome: Progressing  Goal: Verbalize thoughts and feelings  Description: Interventions:  - Assess and re-assess patient's level of risk   - Engage patient in 1:1 interactions, daily, for a minimum of 15 minutes   - Encourage patient to express feelings, fears, frustrations, hopes   6/16/2021 1057 by Jeremiah Dominguez RN  Outcome: Progressing  6/16/2021 1027 by Jeremiah Dominguez RN  Outcome: Progressing  Goal: Refrain from harming self  Description: Interventions:  - Monitor patient closely, per order   - Supervise medication ingestion, monitor effects and side effects   6/16/2021 1057 by Jeremiah Dominguez RN  Outcome: Progressing  6/16/2021 1027 by Jeremiah Dominguez RN  Outcome: Progressing  Goal: Refrain from isolation  Description: Interventions:  - Develop a trusting relationship   - Encourage socialization   6/16/2021 1057 by Jeremiah Dominguez RN  Outcome: Progressing  6/16/2021 1027 by Asiya Bryan Figueroa Vizcarra RN  Outcome: Progressing  Goal: Refrain from self-neglect  6/16/2021 1057 by Herman Brush RN  Outcome: Progressing  6/16/2021 1027 by Herman Brush RN  Outcome: Progressing  Goal: Attend and participate in unit activities, including therapeutic, recreational, and educational groups  Description: Interventions:  - Provide therapeutic and educational activities daily, encourage attendance and participation, and document same in the medical record   6/16/2021 1057 by Herman Brush RN  Outcome: Progressing  6/16/2021 1027 by Herman Brush RN  Outcome: Progressing  Goal: Complete daily ADLs, including personal hygiene independently, as able  Description: Interventions:  - Observe, teach, and assist patient with ADLS  -  Monitor and promote a balance of rest/activity, with adequate nutrition and elimination   6/16/2021 1057 by Herman Brush RN  Outcome: Progressing  6/16/2021 1027 by Herman Brush RN  Outcome: Progressing     Problem: Anxiety  Goal: Anxiety is at manageable level  Description: Interventions:  - Assess and monitor patient's anxiety level  - Monitor for signs and symptoms (heart palpitations, chest pain, shortness of breath, headaches, nausea, feeling jumpy, restlessness, irritable, apprehensive)  - Collaborate with interdisciplinary team and initiate plan and interventions as ordered    - [place] patient to unit/surroundings  - Explain treatment plan  - Encourage participation in care  - Encourage verbalization of concerns/fears  - Identify coping mechanisms  - Assist in developing anxiety-reducing skills  - Administer/offer alternative therapies  - Limit or eliminate stimulants  6/16/2021 1057 by Herman Brush RN  Outcome: Progressing  6/16/2021 1027 by Herman Brush RN  Outcome: Progressing     Problem: Risk for Violence/Aggression Toward Others  Goal: Treatment Goal: Refrain from acts of violence/aggression during length of stay, and demonstrate improved impulse control at the time of discharge  6/16/2021 1057 by Elease Bamberger, RN  Outcome: Progressing  6/16/2021 1027 by Elease Bamberger, RN  Outcome: Progressing  Goal: Verbalize thoughts and feelings  Description: Interventions:  - Assess and re-assess patient's level of risk, every waking shift  - Engage patient in 1:1 interactions, daily, for a minimum of 15 minutes   - Allow patient to express feelings and frustrations in a safe and non-threatening manner   - Establish rapport/trust with patient   6/16/2021 1057 by Elease Bamberger, RN  Outcome: Progressing  6/16/2021 1027 by Elease Bamberger, RN  Outcome: Progressing  Goal: Refrain from harming others  6/16/2021 1057 by Elease Bamberger, RN  Outcome: Progressing  6/16/2021 1027 by Elease Bamberger, RN  Outcome: Progressing  Goal: Refrain from destructive acts on the environment or property  6/16/2021 1057 by Elease Bamberger, RN  Outcome: Progressing  6/16/2021 1027 by Elease Bamberger, RN  Outcome: Progressing  Goal: Control angry outbursts  Description: Interventions:  - Monitor patient closely, per order  - Ensure early verbal de-escalation  - Monitor prn medication needs  - Set reasonable/therapeutic limits, outline behavioral expectations, and consequences   - Provide a non-threatening milieu, utilizing the least restrictive interventions   6/16/2021 1057 by Elease Bamberger, RN  Outcome: Progressing  6/16/2021 1027 by Elease Bamberger, RN  Outcome: Progressing  Goal: Attend and participate in unit activities, including therapeutic, recreational, and educational groups  Description: Interventions:  - Provide therapeutic and educational activities daily, encourage attendance and participation, and document same in the medical record   6/16/2021 1057 by Elease Bamberger, RN  Outcome: Progressing  6/16/2021 1027 by Elease Bamberger, RN  Outcome: Progressing  Goal: Identify appropriate positive anger management techniques  Description: Interventions:  - Offer anger management and coping skills groups   - Staff will provide positive feedback for appropriate anger control  6/16/2021 1057 by Rachelle Sandoval RN  Outcome: Progressing  6/16/2021 1027 by Rachelle Sandoval RN  Outcome: Progressing     Problem: Alteration in Orientation  Goal: Treatment Goal: Demonstrate a reduction of confusion and improved orientation to person, place, time and/or situation upon discharge, according to optimum baseline/ability  6/16/2021 1057 by Rachelle Sandoval RN  Outcome: Progressing  6/16/2021 1027 by Rachelle Sandoval RN  Outcome: Progressing  Goal: Interact with staff daily  Description: Interventions:  - Assess and re-assess patient's level of orientation  - Engage patient in 1 on 1 interactions, daily, for a minimum of 15 minutes   - Establish rapport/trust with patient   6/16/2021 1057 by Rachelle Sandoval RN  Outcome: Progressing  6/16/2021 1027 by Rachelle Sandoval RN  Outcome: Progressing  Goal: Express concerns related to confused thinking related to:  Description: Interventions:  - Encourage patient to express feelings, fears, frustrations, hopes  - Assign consistent caregivers   - [place]/re-orient patient as needed  - Allow comfort items, as appropriate  - Provide visual cues, signs, etc    6/16/2021 1057 by Rachelle Sandoval RN  Outcome: Progressing  6/16/2021 1027 by Rachelle Sandoval RN  Outcome: Progressing  Goal: Allow medical examinations, as recommended  Description: Interventions:  - Provide physical/neurological exams and/or referrals, per provider   6/16/2021 1057 by Rachelle Sandoval RN  Outcome: Progressing  6/16/2021 1027 by Rachelle Sandoval RN  Outcome: Progressing  Goal: Cooperate with recommended testing/procedures  Description: Interventions:  - Determine need for ancillary testing  - Observe for mental status changes  - Implement falls/precaution protocol   6/16/2021 1057 by Rachelle Sandoval RN  Outcome: Progressing  6/16/2021 1027 by Griselda Artist Tonya Mcgill RN  Outcome: Progressing  Goal: Attend and participate in unit activities, including therapeutic, recreational, and educational groups  Description: Interventions:  - Provide therapeutic and educational activities daily, encourage attendance and participation, and document same in the medical record   - Provide appropriate opportunities for reminiscence   - Provide a consistent daily routine   - Encourage family contact/visitation   6/16/2021 1057 by Amna Pereira RN  Outcome: Progressing  6/16/2021 1027 by Amna Pereira RN  Outcome: Progressing  Goal: Complete daily ADLs, including personal hygiene independently, as able  Description: Interventions:  - Observe, teach, and assist patient with ADLS  [id number] by Amna Pereira RN  Outcome: Progressing  6/16/2021 1027 by Amna Pereira RN  Outcome: Progressing     Problem: Individualized Interventions  Goal: Patient will verbalize appropriate use of telephone within 5 days  Description: Interventions:  - Treatment team to determine use of supervised phone privileges   6/16/2021 1057 by Amna Pereira RN  Outcome: Progressing  6/16/2021 1027 by Amna Pereira RN  Outcome: Progressing  Goal: Patient will verbalize need for hospitalization and will no longer attempt elopement within 5 days  Description: Interventions:  - Ongoing education to help patient understand need for hospitalization  6/16/2021 1057 by Amna Pereira RN  Outcome: Progressing  6/16/2021 1027 by Amna Pereira RN  Outcome: Progressing  Goal: Patient will recognize inappropriate behaviors and develop alternative behaviors within 5 days  Description: Interventions:  - Patient in collaboration with Treatment Team will develop a behavior management plan to help identify effective coping skills to deal with stressors  6/16/2021 1057 by Amna Pereira RN  Outcome: Progressing  6/16/2021 1027 by Amna Pereira RN  Outcome: Progressing     Problem: DISCHARGE PLANNING - CARE MANAGEMENT  Goal: Discharge to post-acute care or home with appropriate resources  Description: INTERVENTIONS:  - Conduct assessment to determine patient/family and health care team treatment goals, and need for post-acute services based on payer coverage, community resources, and patient preferences, and barriers to discharge  - Address psychosocial, clinical, and financial barriers to discharge as identified in assessment in conjunction with the patient/family and health care team  - Arrange appropriate level of post-acute services according to patients   needs and preference and payer coverage in collaboration with the physician and health care team  - Communicate with and update the patient/family, physician, and health care team regarding progress on the discharge plan  - Arrange appropriate transportation to post-acute venues  6/16/2021 1057 by Pryianka Tsai RN  Outcome: Progressing  6/16/2021 1027 by Priyanka Tsai, RN  Outcome: Progressing

## 2021-06-16 NOTE — NURSING NOTE
Patient is visible and behavior in control with no major outbursts or episodes of screaming,  She frequently has an irritable edge and is guarded and does not engage more than superficially   At the end of the evening she said "I had a good day today; that's because I want to go home" Acknowledgement of her efforts was rendered

## 2021-06-16 NOTE — NURSING NOTE
Arlyn Schirmer requested prn Robaxin for complaints of muscle spasms in her legs  Robaxin 500 mg po prn dose given at 1916  Continue to monitor/assess for effectiveness

## 2021-06-16 NOTE — CASE MANAGEMENT
06/16/21 0932   Team Meeting   Meeting Type Daily Rounds   Initial Conference Date 06/16/21   Team Members Present   Team Members Present Physician;Nurse;;; Other (Discipline and Name)   Physician Team Member Dr Alex Childress Management Team Member [address] [place] [address] Work Team Member Liliane Shankar   Other (Discipline and Name) Mary Wilkerson   Patient/Family Present   Patient Present No   Patient's Family Present No     Case reviewed: calm, cooperative and pleasant on day shift yesterday, irritable, screaming and AH on evening shift yesterday, behavioral, asking about d/c, 4/7 groups, slept, med compliant

## 2021-06-16 NOTE — PLAN OF CARE
Problem: Alteration in Thoughts and Perception  Goal: Treatment Goal: Gain control of psychotic behaviors/thinking, reduce/eliminate presenting symptoms and demonstrate improved reality functioning upon discharge  Outcome: Progressing  Goal: Verbalize thoughts and feelings  Description: Interventions:  - Promote a nonjudgmental and trusting relationship with the patient through active listening and therapeutic communication  - Assess patient's level of functioning, behavior and potential for risk  - Engage patient in 1 on 1 interactions  - Encourage patient to express fears, feelings, frustrations, and discuss symptoms    - [place] patient to reality, help patient recognize reality-based thinking   - Administer medications as ordered and assess for potential side effects  - Provide the patient education related to the signs and symptoms of the illness and desired effects of prescribed medications  Outcome: Progressing  Goal: Refrain from acting on delusional thinking/internal stimuli  Description: Interventions:  - Monitor patient closely, per order   - Utilize least restrictive measures   - Set reasonable limits, give positive feedback for acceptable   - Administer medications as ordered and monitor of potential side effects  Outcome: Not Progressing  Goal: Agree to be compliant with medication regime, as prescribed and report medication side effects  Description: Interventions:  - Offer appropriate PRN medication and supervise ingestion; conduct AIMS, as needed   Outcome: Progressing  Goal: Attend and participate in unit activities, including therapeutic, recreational, and educational groups  Description: Interventions:  -Encourage Visitation and family involvement in care  Outcome: Progressing  Goal: Recognize dysfunctional thoughts, communicate reality-based thoughts at the time of discharge  Description: Interventions:  - Provide medication and psycho-education to assist patient in compliance and developing insight into his/her illness   Outcome: Not Progressing  Goal: Complete daily ADLs, including personal hygiene independently, as able  Description: Interventions:  - Observe, teach, and assist patient with ADLS  - Monitor and promote a balance of rest/activity, with adequate nutrition and elimination   Outcome: Progressing     Problem: Ineffective Coping  Goal: Cooperates with admission process  Description: Interventions:   - Complete admission process  Outcome: Progressing  Goal: Identifies ineffective coping skills  Outcome: Progressing  Goal: Identifies healthy coping skills  Outcome: Progressing  Goal: Demonstrates healthy coping skills  Outcome: Progressing  Goal: Participates in unit activities  Description: Interventions:  - Provide therapeutic environment   - Provide required programming   - Redirect inappropriate behaviors   Outcome: Progressing  Goal: Patient/Family participate in treatment and DC plans  Description: Interventions:  - Provide therapeutic environment  Outcome: Progressing  Goal: Patient/Family verbalizes awareness of resources  Outcome: Progressing  Goal: Understands least restrictive measures  Description: Interventions:  - Utilize least restrictive behavior  Outcome: Progressing  Goal: Free from restraint events  Description: - Utilize least restrictive measures   - Provide behavioral interventions   - Redirect inappropriate behaviors   Outcome: Progressing     Problem: Risk for Self Injury/Neglect  Goal: Treatment Goal: Remain safe during length of stay, learn and adopt new coping skills, and be free of self-injurious ideation, impulses and acts at the time of discharge  Outcome: Not Progressing  Goal: Verbalize thoughts and feelings  Description: Interventions:  - Assess and re-assess patient's lethality and potential for self-injury  - Engage patient in 1:1 interactions, daily, for a minimum of 15 minutes  - Encourage patient to express feelings, fears, frustrations, hopes  - Establish rapport/trust with patient   Outcome: Progressing  Goal: Refrain from harming self  Description: Interventions:  - Monitor patient closely, per order  - Develop a trusting relationship  - Supervise medication ingestion, monitor effects and side effects   Outcome: Progressing  Goal: Attend and participate in unit activities, including therapeutic, recreational, and educational groups  Description: Interventions:  - Provide therapeutic and educational activities daily, encourage attendance and participation, and document same in the medical record  - Obtain collateral information, encourage visitation and family involvement in care   Outcome: Progressing  Goal: Recognize maladaptive responses and adopt new coping mechanisms  Outcome: Not Progressing  Goal: Complete daily ADLs, including personal hygiene independently, as able  Description: Interventions:  - Observe, teach, and assist patient with ADLS  - Monitor and promote a balance of rest/activity, with adequate nutrition and elimination  Outcome: Progressing     Problem: Depression  Goal: Treatment Goal: Demonstrate behavioral control of depressive symptoms, verbalize feelings of improved mood/affect, and adopt new coping skills prior to discharge  Outcome: Progressing  Goal: Verbalize thoughts and feelings  Description: Interventions:  - Assess and re-assess patient's level of risk   - Engage patient in 1:1 interactions, daily, for a minimum of 15 minutes   - Encourage patient to express feelings, fears, frustrations, hopes   Outcome: Progressing  Goal: Refrain from harming self  Description: Interventions:  - Monitor patient closely, per order   - Supervise medication ingestion, monitor effects and side effects   Outcome: Progressing  Goal: Refrain from isolation  Description: Interventions:  - Develop a trusting relationship   - Encourage socialization   Outcome: Progressing  Goal: Refrain from self-neglect  Outcome: Progressing  Goal: Attend and participate in unit activities, including therapeutic, recreational, and educational groups  Description: Interventions:  - Provide therapeutic and educational activities daily, encourage attendance and participation, and document same in the medical record   Outcome: Progressing  Goal: Complete daily ADLs, including personal hygiene independently, as able  Description: Interventions:  - Observe, teach, and assist patient with ADLS  -  Monitor and promote a balance of rest/activity, with adequate nutrition and elimination   Outcome: Progressing     Problem: Anxiety  Goal: Anxiety is at manageable level  Description: Interventions:  - Assess and monitor patient's anxiety level  - Monitor for signs and symptoms (heart palpitations, chest pain, shortness of breath, headaches, nausea, feeling jumpy, restlessness, irritable, apprehensive)  - Collaborate with interdisciplinary team and initiate plan and interventions as ordered    - [place] patient to unit/surroundings  - Explain treatment plan  - Encourage participation in care  - Encourage verbalization of concerns/fears  - Identify coping mechanisms  - Assist in developing anxiety-reducing skills  - Administer/offer alternative therapies  - Limit or eliminate stimulants  Outcome: Not Progressing     Problem: Risk for Violence/Aggression Toward Others  Goal: Treatment Goal: Refrain from acts of violence/aggression during length of stay, and demonstrate improved impulse control at the time of discharge  Outcome: Progressing  Goal: Verbalize thoughts and feelings  Description: Interventions:  - Assess and re-assess patient's level of risk, every waking shift  - Engage patient in 1:1 interactions, daily, for a minimum of 15 minutes   - Allow patient to express feelings and frustrations in a safe and non-threatening manner   - Establish rapport/trust with patient   Outcome: Progressing  Goal: Refrain from harming others  Outcome: Progressing  Goal: Refrain from destructive acts on the environment or property  Outcome: Progressing  Goal: Control angry outbursts  Description: Interventions:  - Monitor patient closely, per order  - Ensure early verbal de-escalation  - Monitor prn medication needs  - Set reasonable/therapeutic limits, outline behavioral expectations, and consequences   - Provide a non-threatening milieu, utilizing the least restrictive interventions   Outcome: Not Progressing  Goal: Attend and participate in unit activities, including therapeutic, recreational, and educational groups  Description: Interventions:  - Provide therapeutic and educational activities daily, encourage attendance and participation, and document same in the medical record   Outcome: Progressing  Goal: Identify appropriate positive anger management techniques  Description: Interventions:  - Offer anger management and coping skills groups   - Staff will provide positive feedback for appropriate anger control  Outcome: Not Progressing     Problem: Alteration in Orientation  Goal: Treatment Goal: Demonstrate a reduction of confusion and improved orientation to person, place, time and/or situation upon discharge, according to optimum baseline/ability  Outcome: Progressing  Goal: Interact with staff daily  Description: Interventions:  - Assess and re-assess patient's level of orientation  - Engage patient in 1 on 1 interactions, daily, for a minimum of 15 minutes   - Establish rapport/trust with patient   Outcome: Progressing  Goal: Express concerns related to confused thinking related to:  Description: Interventions:  - Encourage patient to express feelings, fears, frustrations, hopes  - Assign consistent caregivers   - [place]/re-orient patient as needed  - Allow comfort items, as appropriate  - Provide visual cues, signs, etc    Outcome: Not Progressing  Goal: Allow medical examinations, as recommended  Description: Interventions:  - Provide physical/neurological exams and/or referrals, per provider   Outcome: Progressing  Goal: Cooperate with recommended testing/procedures  Description: Interventions:  - Determine need for ancillary testing  - Observe for mental status changes  - Implement falls/precaution protocol   Outcome: Progressing  Goal: Attend and participate in unit activities, including therapeutic, recreational, and educational groups  Description: Interventions:  - Provide therapeutic and educational activities daily, encourage attendance and participation, and document same in the medical record   - Provide appropriate opportunities for reminiscence   - Provide a consistent daily routine   - Encourage family contact/visitation   Outcome: Progressing  Goal: Complete daily ADLs, including personal hygiene independently, as able  Description: Interventions:  - Observe, teach, and assist patient with ADLS  Outcome: Progressing     Problem: Individualized Interventions  Goal: Patient will verbalize appropriate use of telephone within 5 days  Description: Interventions:  - Treatment team to determine use of supervised phone privileges   Outcome: Progressing  Goal: Patient will verbalize need for hospitalization and will no longer attempt elopement within 5 days  Description: Interventions:  - Ongoing education to help patient understand need for hospitalization  Outcome: Progressing  Goal: Patient will recognize inappropriate behaviors and develop alternative behaviors within 5 days  Description: Interventions:  - Patient in collaboration with Treatment Team will develop a behavior management plan to help identify effective coping skills to deal with stressors  Outcome: Progressing

## 2021-06-16 NOTE — PROGRESS NOTES
Psychiatry Progress Note Paulie Hill [age] y o  female MRN: [id number]  Unit/Bed#: [facility] 303-03 Encounter: [id number]  Code Status: Level 1 - Full Code    PCP: Naty Calvin DO    Date of Admission:  4/7/2021 1435   Date of Service:  06/16/21    Patient Active Problem List   Diagnosis    Schizoaffective disorder, bipolar type ([facility] )    Uncomplicated alcohol dependence ([facility] )    Suicidal behavior    LYNN (generalized anxiety disorder)    Slow transit constipation    Deficiency of vitamin B    Degenerative disc disease, lumbar    Post-traumatic stress disorder, chronic    Lower extremity weakness    Generalized abdominal pain    Non-intractable vomiting    Medical clearance for psychiatric admission    Carbuncle    Alcohol dependence with unspecified alcohol-induced disorder ([facility] )    Mild intermittent asthma without complication    Tobacco abuse    Ear problem, bilateral    Gastroesophageal reflux disease    Right foot pain    Ear problem, right     Review of systems:          unremarkable  Diagnosis:           Schizoaffective bipolar, alcohol use disorder in early remission in controlled environment  Assessment   Overall Status:          He was again yelling talking about hearing voices coming through the vent on the ceiling yesterday  In her room and did stop when staff intervened   Certification Statement: The patient will continue to require additional inpatient hospital stay due to ongoing mood swings paranoia   Acceptance by patient:  accepting    Hopefulness in recovery:  Living in a group home   Understanding of medications: has good understanding   Involved in reintegration process:  Adjusting to the unit   Trusting in relationship with psychiatrist:  trusting   Justification for dual anti-psychotics: due to lack of response to single antipsychotics   Side effects from treatment: none    Medication changes   None  today  Medication Education;  Risks and S/E and precautions of all meds given to patient and she did verbalize an understanding   Non-pharmacological treatments   Continue with individual, group, milieu and occupational therapy using recovery principles and psycho-education about accepting illness and the need for treatment  Safety   Safety and communication plan established to target dynamic risk factors discussed above  Discharge Plan     most likely to a group home with the act team again     Interval Progress      Patient has been yelling screaming for brief the it is and continues to believe that no one has helped her in controlling the voices which has been happening for years  Continues to be delusional about someone stalking her and leaving him stays on the floor about and talking to her through the vents on the ceiling which he believes is from either a man or a woman  Again accuses people of hypnotizing her  He has not been aggressive threatening self-abusive and is attending some groups and is exhibiting mild psychomotor agitation     Sleep:                     good   appetite:                  good   compliance with medications :   compliant   Side  Effects:                              None reported   significant events:                       Still delusional screaming yelling at times but less often   group attendance:                       Attending most of the groups  4/7    Mental Status Exam  Appearance: age appropriate, casually dressed, looks older than stated age, underweight      Agreed to talk to the medical students today  Behavior: normal, pleasant, cooperative, mildly anxious,  more friendly today and not confrontational     Speech: normal pitch, fluent, clear, coherent, increased rate, hypertalkative,   Circumstantial pressured    Mood: depressed, anxious, euphoric  Affect: labile, reactive, slightly brighter   Slightly anxious  Thought Process: normal abstract reasoning, less prominent, tends to be pressured and circumstantial and preoccupied perseverating on discharge    Thought Content: some paranoia, ideas of reference, but does appear as if paranoid  No current suicidal homicidal thoughts intent or plans verbalized  No phobias obsessions compulsions or distorted body perceptions elicited  Also believes TV is sending her messages from movie stars checking her if she is good or bad and threatening to get the feds to go after her  Still talks about a man or woman stalking her and living upstairs in talking to her through the vents on the ceiling at times   Perceptual Disturbances: no auditory hallucinations, no visual hallucinations, denies auditory hallucinations when asked, does not appear responding to internal stimuli, no voices reported today       Hx Risk Factors: chronic mood disorder, chronic psychotic symptoms, alcohol use, limted insight  Sensorium:      Oriented x3 spheres and to situation  Cognition: recent and remote memory grossly intact  Consciousness: alert and awake  Attention: attention span and concentration are age appropriate  Intellect: appears to be of average intelligence  Insight: improving  Judgement: improving  Motor Activity: no abnormal movements     Vitals  Temp:  [97 3 °F (36 3 °C)-98 1 °F (36 7 °C)] 97 3 °F (36 3 °C)  HR:  [] 102  Resp:  [17-20] 20  BP: (115-131)/(60-80) 131/80  No intake or output data in the 24 hours ending 06/16/21 0512    Lab Results:  [facility] Admission Reviewed     Current Facility-Administered Medications   Medication Dose Route Frequency Provider Last Rate    acetaminophen  650 mg Oral Q6H PRN Chantel Dahl MD      acetaminophen  650 mg Oral Q4H PRN Chantel Dahl MD      acetaminophen  975 mg Oral Q6H PRN Chantel Dahl MD      al mag oxide-diphenhydramine-lidocaine viscous  10 mL Swish & Swallow Q4H PRN Chantel Dahl MD      albuterol  2 puff Inhalation Q6H PRN Chantel Dahl MD      aluminum-magnesium hydroxide-simethicone  15 mL Oral Q4H PRN Talisha GlaMD hector      benztropine  1 mg Intramuscular Q4H PRN Max 6/day Talisha Glahector, MD      benztropine  1 mg Oral Q4H PRN Max 6/day Talisha Glahector, MD      benztropine  1 mg Oral BID Talisha Glahector, MD      calcium carbonate  500 mg Oral TID PRN Talisha Glahector, MD      Diclofenac Sodium  2 g Topical 4x Daily PRN Talisha Glahector, MD      diphenhydrAMINE  25 mg Oral HS Talisha Glad, MD      fenofibrate  145 mg Oral Daily Talisha Glad, MD      gabapentin  600 mg Oral 4x Daily Talisha Glad, MD      haloperidol  1 mg Oral 4x Daily Talisha Glad, MD      haloperidol  5 mg Oral Q6H PRN Talisha Glad, MD      hydrOXYzine HCL  25 mg Oral Q6H PRN Max 4/day Talisha Glad, MD      hydrOXYzine HCL  50 mg Oral Q6H PRN Max 4/day Talisha Glad, MD      lidocaine  1 patch Topical Daily Talisha Glad, MD      LORazepam  1 mg Intramuscular Q6H PRN Talisha Glad, MD      LORazepam  0 5 mg Oral 4x Daily Talisha Glahector, MD      magnesium hydroxide  30 mL Oral Daily PRN Talisha Glahector, MD      methocarbamol  500 mg Oral Q8H PRN Talisha Glad, MD      nicotine  1 patch Transdermal Daily Talisha Glahector, MD      nicotine polacrilex  4 mg Oral Q2H PRN Talisha Glahector, MD      OLANZapine  2 5 mg Oral Q8H PRN Talisha Glahector, MD      OLANZapine  5 mg Oral Q3H PRN Talisha Glahector, MD      OLANZapine  7 5 mg Oral Q3H PRN Talisha Glad, MD      OLANZapine  10 mg Intramuscular Q3H PRN Talisha Glahector, MD      OLANZapine  5 mg Intramuscular Q3H PRN Talisha Glahector, MD      OLANZapine  10 mg Oral HS Talisha Glahector, MD      OLANZapine  15 mg Oral Daily Talisha GlaMD hector      paliperidone palmitate ER  234 mg Intramuscular Q30 Days Talisha GlaMD hector      pantoprazole  40 mg Oral Early Morning Talisha Glad, MD      Pramox-PE-Glycerin-Petrolatum  1 application Rectal 4x Daily PRN Talisha Glahector, MD      prazosin  1 mg Oral HS Talisha Glad, MD      psyllium  1 packet Oral Daily Talisha GlaMD hector      sertraline  100 mg Oral Daily Talisha GlaMD hector         Counseling / Coordination of Care: Total floor / unit time spent today 15 minutes  Greater than 50% of total time was spent with the patient and / or family counseling and / or somewhat receptive to supportive listening and teaching positive coping skills to deal with symptom mangement  Patient's Rights, confidentiality and exceptions to confidentiality, use of automated medical record, [facility] staff access to medical record, and consent to treatment reviewed  This note was  not shared with the patient because it might further aggravate her psychiatric condition  This note has been dictated

## 2021-06-16 NOTE — PLAN OF CARE
Problem: Nutrition/Hydration-ADULT  Goal: Nutrient/Hydration intake appropriate for improving, restoring or maintaining nutritional needs  Description: Monitor and assess patient's nutrition/hydration status for malnutrition  Collaborate with interdisciplinary team and initiate plan and interventions as ordered  Monitor patient's weight and dietary intake as ordered or per policy  Utilize nutrition screening tool and intervene as necessary  Determine patient's food preferences and provide high-protein, high-caloric foods as appropriate       INTERVENTIONS:  - Monitor oral intake, urinary output, labs, and treatment plans  - Assess nutrition and hydration status and recommend course of action  - Evaluate amount of meals eaten  - Assist patient with eating if necessary   - Allow adequate time for meals  - Recommend/ encourage appropriate diets, oral nutritional supplements, and vitamin/mineral supplements  - Order, calculate, and assess calorie counts as needed  - Recommend, monitor, and adjust tube feedings and TPN/PPN based on assessed needs  - Assess need for intravenous fluids  - Provide specific nutrition/hydration education as appropriate  - Include patient/family/caregiver in decisions related to nutrition  Outcome: Progressing     Problem: Alteration in Thoughts and Perception  Goal: Treatment Goal: Gain control of psychotic behaviors/thinking, reduce/eliminate presenting symptoms and demonstrate improved reality functioning upon discharge  Outcome: Progressing  Goal: Verbalize thoughts and feelings  Description: Interventions:  - Promote a nonjudgmental and trusting relationship with the patient through active listening and therapeutic communication  - Assess patient's level of functioning, behavior and potential for risk  - Engage patient in 1 on 1 interactions  - Encourage patient to express fears, feelings, frustrations, and discuss symptoms    - [place] patient to reality, help patient recognize reality-based thinking   - Administer medications as ordered and assess for potential side effects  - Provide the patient education related to the signs and symptoms of the illness and desired effects of prescribed medications  Outcome: Progressing  Goal: Agree to be compliant with medication regime, as prescribed and report medication side effects  Description: Interventions:  - Offer appropriate PRN medication and supervise ingestion; conduct AIMS, as needed   Outcome: Progressing  Goal: Attend and participate in unit activities, including therapeutic, recreational, and educational groups  Description: Interventions:  -Encourage Visitation and family involvement in care  Outcome: Progressing  Goal: Recognize dysfunctional thoughts, communicate reality-based thoughts at the time of discharge  Description: Interventions:  - Provide medication and psycho-education to assist patient in compliance and developing insight into his/her illness   Outcome: Progressing  Goal: Complete daily ADLs, including personal hygiene independently, as able  Description: Interventions:  - Observe, teach, and assist patient with ADLS  - Monitor and promote a balance of rest/activity, with adequate nutrition and elimination   Outcome: Progressing     Problem: Ineffective Coping  Goal: Cooperates with admission process  Description: Interventions:   - Complete admission process  Outcome: Progressing  Goal: Identifies ineffective coping skills  Outcome: Progressing  Goal: Identifies healthy coping skills  Outcome: Progressing  Goal: Demonstrates healthy coping skills  Outcome: Progressing  Goal: Participates in unit activities  Description: Interventions:  - Provide therapeutic environment   - Provide required programming   - Redirect inappropriate behaviors   Outcome: Progressing  Goal: Patient/Family participate in treatment and DC plans  Description: Interventions:  - Provide therapeutic environment  Outcome: Progressing  Goal: Patient/Family verbalizes awareness of resources  Outcome: Progressing  Goal: Understands least restrictive measures  Description: Interventions:  - Utilize least restrictive behavior  Outcome: Progressing  Goal: Free from restraint events  Description: - Utilize least restrictive measures   - Provide behavioral interventions   - Redirect inappropriate behaviors   Outcome: Progressing     Problem: Risk for Self Injury/Neglect  Goal: Treatment Goal: Remain safe during length of stay, learn and adopt new coping skills, and be free of self-injurious ideation, impulses and acts at the time of discharge  Outcome: Progressing  Goal: Verbalize thoughts and feelings  Description: Interventions:  - Assess and re-assess patient's lethality and potential for self-injury  - Engage patient in 1:1 interactions, daily, for a minimum of 15 minutes  - Encourage patient to express feelings, fears, frustrations, hopes  - Establish rapport/trust with patient   Outcome: Progressing  Goal: Refrain from harming self  Description: Interventions:  - Monitor patient closely, per order  - Develop a trusting relationship  - Supervise medication ingestion, monitor effects and side effects   Outcome: Progressing  Goal: Attend and participate in unit activities, including therapeutic, recreational, and educational groups  Description: Interventions:  - Provide therapeutic and educational activities daily, encourage attendance and participation, and document same in the medical record  - Obtain collateral information, encourage visitation and family involvement in care   Outcome: Progressing  Goal: Recognize maladaptive responses and adopt new coping mechanisms  Outcome: Progressing  Goal: Complete daily ADLs, including personal hygiene independently, as able  Description: Interventions:  - Observe, teach, and assist patient with ADLS  - Monitor and promote a balance of rest/activity, with adequate nutrition and elimination  Outcome: Progressing Problem: Depression  Goal: Treatment Goal: Demonstrate behavioral control of depressive symptoms, verbalize feelings of improved mood/affect, and adopt new coping skills prior to discharge  Outcome: Progressing  Goal: Verbalize thoughts and feelings  Description: Interventions:  - Assess and re-assess patient's level of risk   - Engage patient in 1:1 interactions, daily, for a minimum of 15 minutes   - Encourage patient to express feelings, fears, frustrations, hopes   Outcome: Progressing  Goal: Refrain from harming self  Description: Interventions:  - Monitor patient closely, per order   - Supervise medication ingestion, monitor effects and side effects   Outcome: Progressing  Goal: Refrain from isolation  Description: Interventions:  - Develop a trusting relationship   - Encourage socialization   Outcome: Progressing  Goal: Refrain from self-neglect  Outcome: Progressing  Goal: Attend and participate in unit activities, including therapeutic, recreational, and educational groups  Description: Interventions:  - Provide therapeutic and educational activities daily, encourage attendance and participation, and document same in the medical record   Outcome: Progressing  Goal: Complete daily ADLs, including personal hygiene independently, as able  Description: Interventions:  - Observe, teach, and assist patient with ADLS  -  Monitor and promote a balance of rest/activity, with adequate nutrition and elimination   Outcome: Progressing     Problem: Anxiety  Goal: Anxiety is at manageable level  Description: Interventions:  - Assess and monitor patient's anxiety level  - Monitor for signs and symptoms (heart palpitations, chest pain, shortness of breath, headaches, nausea, feeling jumpy, restlessness, irritable, apprehensive)  - Collaborate with interdisciplinary team and initiate plan and interventions as ordered    - [place] patient to unit/surroundings  - Explain treatment plan  - Encourage participation in care  - Encourage verbalization of concerns/fears  - Identify coping mechanisms  - Assist in developing anxiety-reducing skills  - Administer/offer alternative therapies  - Limit or eliminate stimulants  Outcome: Progressing     Problem: Risk for Violence/Aggression Toward Others  Goal: Treatment Goal: Refrain from acts of violence/aggression during length of stay, and demonstrate improved impulse control at the time of discharge  Outcome: Progressing  Goal: Verbalize thoughts and feelings  Description: Interventions:  - Assess and re-assess patient's level of risk, every waking shift  - Engage patient in 1:1 interactions, daily, for a minimum of 15 minutes   - Allow patient to express feelings and frustrations in a safe and non-threatening manner   - Establish rapport/trust with patient   Outcome: Progressing  Goal: Refrain from harming others  Outcome: Progressing  Goal: Refrain from destructive acts on the environment or property  Outcome: Progressing  Goal: Control angry outbursts  Description: Interventions:  - Monitor patient closely, per order  - Ensure early verbal de-escalation  - Monitor prn medication needs  - Set reasonable/therapeutic limits, outline behavioral expectations, and consequences   - Provide a non-threatening milieu, utilizing the least restrictive interventions   Outcome: Progressing  Goal: Attend and participate in unit activities, including therapeutic, recreational, and educational groups  Description: Interventions:  - Provide therapeutic and educational activities daily, encourage attendance and participation, and document same in the medical record   Outcome: Progressing  Goal: Identify appropriate positive anger management techniques  Description: Interventions:  - Offer anger management and coping skills groups   - Staff will provide positive feedback for appropriate anger control  Outcome: Progressing     Problem: Alteration in Orientation  Goal: Treatment Goal: Demonstrate a reduction of confusion and improved orientation to person, place, time and/or situation upon discharge, according to optimum baseline/ability  Outcome: Progressing  Goal: Interact with staff daily  Description: Interventions:  - Assess and re-assess patient's level of orientation  - Engage patient in 1 on 1 interactions, daily, for a minimum of 15 minutes   - Establish rapport/trust with patient   Outcome: Progressing  Goal: Express concerns related to confused thinking related to:  Description: Interventions:  - Encourage patient to express feelings, fears, frustrations, hopes  - Assign consistent caregivers   - [place]/re-orient patient as needed  - Allow comfort items, as appropriate  - Provide visual cues, signs, etc    Outcome: Progressing  Goal: Allow medical examinations, as recommended  Description: Interventions:  - Provide physical/neurological exams and/or referrals, per provider   Outcome: Progressing  Goal: Cooperate with recommended testing/procedures  Description: Interventions:  - Determine need for ancillary testing  - Observe for mental status changes  - Implement falls/precaution protocol   Outcome: Progressing  Goal: Attend and participate in unit activities, including therapeutic, recreational, and educational groups  Description: Interventions:  - Provide therapeutic and educational activities daily, encourage attendance and participation, and document same in the medical record   - Provide appropriate opportunities for reminiscence   - Provide a consistent daily routine   - Encourage family contact/visitation   Outcome: Progressing  Goal: Complete daily ADLs, including personal hygiene independently, as able  Description: Interventions:  - Observe, teach, and assist patient with ADLS  Outcome: Progressing     Problem: Individualized Interventions  Goal: Patient will verbalize appropriate use of telephone within 5 days  Description: Interventions:  - Treatment team to determine use of supervised phone privileges   Outcome: Progressing  Goal: Patient will verbalize need for hospitalization and will no longer attempt elopement within 5 days  Description: Interventions:  - Ongoing education to help patient understand need for hospitalization  Outcome: Progressing  Goal: Patient will recognize inappropriate behaviors and develop alternative behaviors within 5 days  Description: Interventions:  - Patient in collaboration with Treatment Team will develop a behavior management plan to help identify effective coping skills to deal with stressors  Outcome: Progressing     Problem: DISCHARGE PLANNING - CARE MANAGEMENT  Goal: Discharge to post-acute care or home with appropriate resources  Description: INTERVENTIONS:  - Conduct assessment to determine patient/family and health care team treatment goals, and need for post-acute services based on payer coverage, community resources, and patient preferences, and barriers to discharge  - Address psychosocial, clinical, and financial barriers to discharge as identified in assessment in conjunction with the patient/family and health care team  - Arrange appropriate level of post-acute services according to patients   needs and preference and payer coverage in collaboration with the physician and health care team  - Communicate with and update the patient/family, physician, and health care team regarding progress on the discharge plan  - Arrange appropriate transportation to post-acute venues  Outcome: Progressing     Problem: Alteration in Thoughts and Perception  Goal: Refrain from acting on delusional thinking/internal stimuli  Description: Interventions:  - Monitor patient closely, per order   - Utilize least restrictive measures   - Set reasonable limits, give positive feedback for acceptable   - Administer medications as ordered and monitor of potential side effects  Outcome: Not Progressing

## 2021-06-17 PROCEDURE — 99232 SBSQ HOSP IP/OBS MODERATE 35: CPT | Performed by: PSYCHIATRY & NEUROLOGY

## 2021-06-17 RX ADMIN — PALIPERIDONE PALMITATE 234 MG: 234 INJECTION INTRAMUSCULAR at 11:23

## 2021-06-17 RX ADMIN — OLANZAPINE 15 MG: 5 TABLET, FILM COATED ORAL at 08:36

## 2021-06-17 RX ADMIN — PHENYTOIN 1 MG: 125 SUSPENSION ORAL at 21:08

## 2021-06-17 RX ADMIN — BENZTROPINE MESYLATE 1 MG: 1 TABLET ORAL at 08:36

## 2021-06-17 RX ADMIN — PSYLLIUM HUSK 1 PACKET: 3.4 POWDER ORAL at 08:37

## 2021-06-17 RX ADMIN — HALOPERIDOL 1 MG: 1 TABLET ORAL at 17:21

## 2021-06-17 RX ADMIN — GABAPENTIN 600 MG: 300 CAPSULE ORAL at 08:37

## 2021-06-17 RX ADMIN — LORAZEPAM 0.5 MG: 0.5 TABLET ORAL at 21:09

## 2021-06-17 RX ADMIN — HALOPERIDOL 1 MG: 1 TABLET ORAL at 08:36

## 2021-06-17 RX ADMIN — HALOPERIDOL 1 MG: 1 TABLET ORAL at 21:09

## 2021-06-17 RX ADMIN — LORAZEPAM 0.5 MG: 0.5 TABLET ORAL at 11:30

## 2021-06-17 RX ADMIN — PANTOPRAZOLE SODIUM 40 MG: 40 TABLET, DELAYED RELEASE ORAL at 06:02

## 2021-06-17 RX ADMIN — LIDOCAINE 1 PATCH: 50 PATCH CUTANEOUS at 08:37

## 2021-06-17 RX ADMIN — BENZTROPINE MESYLATE 1 MG: 1 TABLET ORAL at 17:21

## 2021-06-17 RX ADMIN — DIPHENHYDRAMINE HCL 25 MG: 25 TABLET ORAL at 21:08

## 2021-06-17 RX ADMIN — FENOFIBRATE 145 MG: 145 TABLET ORAL at 08:36

## 2021-06-17 RX ADMIN — NICOTINE 1 PATCH: 21 PATCH, EXTENDED RELEASE TRANSDERMAL at 08:38

## 2021-06-17 RX ADMIN — HALOPERIDOL 1 MG: 1 TABLET ORAL at 11:30

## 2021-06-17 RX ADMIN — METHOCARBAMOL TABLETS 500 MG: 500 TABLET, COATED ORAL at 18:52

## 2021-06-17 RX ADMIN — DICLOFENAC SODIUM 2 G: 10 GEL TOPICAL at 10:05

## 2021-06-17 RX ADMIN — GABAPENTIN 600 MG: 300 CAPSULE ORAL at 21:08

## 2021-06-17 RX ADMIN — LORAZEPAM 0.5 MG: 0.5 TABLET ORAL at 08:35

## 2021-06-17 RX ADMIN — GABAPENTIN 600 MG: 300 CAPSULE ORAL at 17:21

## 2021-06-17 RX ADMIN — OLANZAPINE 10 MG: 10 TABLET, FILM COATED ORAL at 21:09

## 2021-06-17 RX ADMIN — GABAPENTIN 600 MG: 300 CAPSULE ORAL at 11:31

## 2021-06-17 RX ADMIN — DICLOFENAC SODIUM 2 G: 10 GEL TOPICAL at 18:52

## 2021-06-17 RX ADMIN — SERTRALINE HYDROCHLORIDE 100 MG: 100 TABLET ORAL at 08:36

## 2021-06-17 RX ADMIN — LORAZEPAM 0.5 MG: 0.5 TABLET ORAL at 17:21

## 2021-06-17 NOTE — CASE MANAGEMENT
06/17/21 0923   Team Meeting   Meeting Type Daily Rounds   Initial Conference Date 06/17/21   Team Members Present   Team Members Present Physician;Nurse;; Other (Discipline and Name)   Physician Team Member Dr Jennifer Estrada Team Member [address] [place] [address] Management Team Member Courtney Gusman Work Team Member Emmalene Krabbe   Other (Discipline and Name) Yves Leung   Patient/Family Present   Patient Present No   Patient's Family Present No     Case reviewed: received PRN [place] last night, wants d/c, yelling episode yesterday around noon, slept, pleasant, med compliant, 3/5 groups, pursuing [address] placement

## 2021-06-17 NOTE — NURSING NOTE
Devon Bustamante has been visible, pleasant, and cooperative  Patient had one episode of yelling  Writer spoke with patient about the yelling and she stated "Please don't put a bad note in or write me up, I promise I'll stop the rest of the night " Patient did in fact stop and no further behaviors noted  Patient social with select peers  Denies all psych symptoms  She is medication and meal compliant   Consumed 100% of dinner  Patient attended therapeutic and wrap up  group this shift  Continue to monitor

## 2021-06-17 NOTE — PLAN OF CARE
Problem: Alteration in Thoughts and Perception  Goal: Verbalize thoughts and feelings  Description: Interventions:  - Promote a nonjudgmental and trusting relationship with the patient through active listening and therapeutic communication  - Assess patient's level of functioning, behavior and potential for risk  - Engage patient in 1 on 1 interactions  - Encourage patient to express fears, feelings, frustrations, and discuss symptoms    - [place] patient to reality, help patient recognize reality-based thinking   - Administer medications as ordered and assess for potential side effects  - Provide the patient education related to the signs and symptoms of the illness and desired effects of prescribed medications  Outcome: Progressing  Goal: Attend and participate in unit activities, including therapeutic, recreational, and educational groups  Description: Interventions:  -Encourage Visitation and family involvement in care  Outcome: Progressing

## 2021-06-17 NOTE — NURSING NOTE
Patient is compliant with medication and meals  She attends groups on a regular basis  She is friendly toward peers  Patient still has delusional thinking   Where she yells out  Patient is thinking about going to a group home when discharged

## 2021-06-17 NOTE — PROGRESS NOTES
Psychiatry Progress Note Paulie Hill [age] y o  female MRN: [id number]  Unit/Bed#: [facility] 932-72 Encounter: [id number]  Code Status: Level 1 - Full Code    PCP: Margaret Jenkins DO    Date of Admission:  4/7/2021 1435   Date of Service:  06/17/21    Patient Active Problem List   Diagnosis    Schizoaffective disorder, bipolar type ([facility] )    Uncomplicated alcohol dependence ([facility] )    Suicidal behavior    LYNN (generalized anxiety disorder)    Slow transit constipation    Deficiency of vitamin B    Degenerative disc disease, lumbar    Post-traumatic stress disorder, chronic    Lower extremity weakness    Generalized abdominal pain    Non-intractable vomiting    Medical clearance for psychiatric admission    Carbuncle    Alcohol dependence with unspecified alcohol-induced disorder ([facility] )    Mild intermittent asthma without complication    Tobacco abuse    Ear problem, bilateral    Gastroesophageal reflux disease    Right foot pain    Ear problem, right     Review of systems:         unremarkable  Diagnosis:         schizoaffective bipolar, alcohol use disorder in early remission in controlled environment  Assessment   Overall Status:         Was yelling "Bitch" 1 time in her room at noon time and had to be redirected and now getting impatient to leave   Certification Statement: The patient will continue to require additional inpatient hospital stay due to ongoing mood swings paranoia   Acceptance by patient:  accepting    Hopefulness in recovery:  Living in a group home   Understanding of medications: has good understanding   Involved in reintegration process:  Adjusting to the unit   Trusting in relationship with psychiatrist:  trusting   Justification for dual anti-psychotics: due to lack of response to single antipsychotics   Side effects from treatment: none    Medication changes   None  today  Medication Education;  Risks and S/E and precautions of all meds given to patient and she did verbalize an understanding   Non-pharmacological treatments   Continue with individual, group, milieu and occupational therapy using recovery principles and psycho-education about accepting illness and the need for treatment  Safety   Safety and communication plan established to target dynamic risk factors discussed above  Discharge Plan     most likely to a group home with the act team again     Interval Progress       She was yelling again 1 time "Bitch" around noon to him and she was redirected by staff and accepted her noon medication and then she did calm self down  Impatient about leaving to a group home and even wanting to leave on her own despite her not doing very well living by herself fluid in the past   Continues to be delusional about someone stalking her and living upstairs and talking to her through the vents on the ceiling, getting messages from TV telling her that the feds are after her etc   Continues to believe that the staff is hypnotizingPatient has been yelling screaming for brief the it is and cont her still  Remains overly friendly and pleasant when approached but presents with mild psychomotor agitation  She has not been aggressive self-abusive or destructive and compliant with medications  Now agreeing to Melven Ropes it out till a bed is available at a group home      Sleep:                      good   appetite:                   good   compliance with medications :   compliant   Side  Effects:                               None reported   significant events:                        Continues to occasionally scream, hears voices grandiose  And   delusional    group attendance:                        Attending most of them, 3/5    Mental Status Exam  Appearance: age appropriate, casually dressed, looks older than stated age, underweight        Behavior: normal, pleasant, cooperative, appears anxious, mildly anxious,  more friendly today and not confrontational Speech: fluent, clear, coherent, increased rate, pressured, hypertalkative,   Circumstantial pressured    Mood: depressed, anxious, euphoric  Affect: labile, reactive, slightly brighter   Slightly anxious   Thought Process: normal abstract reasoning, less prominent, tends to be pressured and circumstantial and preoccupied perseverating on discharge    Thought Content: some paranoia, ideas of reference, but does appear as if paranoid  No current suicidal homicidal thoughts intent or plans verbalized  No phobias obsessions compulsions or distorted body perceptions elicited  Also believes TV is sending her messages from [employer] checking her if she is good or bad and threatening to get the feds to go after her  Claims to hear voices from the vents and still says he hears sone one from upstairs to get him out of the [facility] hospital    Perceptual Disturbances: no auditory hallucinations, no visual hallucinations, denies auditory hallucinations when asked, does not appear responding to internal stimuli, no voices reported today       Hx Risk Factors: chronic mood disorder, chronic psychotic symptoms, alcohol use, limted insight  Sensorium:      Oriented to 3 spheres and to situation  Cognition: recent and remote memory grossly intact  Consciousness: alert and awake  Attention: attention span and concentration are age appropriate  Intellect: appears to be of average intelligence  Insight: improving  Judgement: improving  Motor Activity: no abnormal movements     Vitals  Temp:  [97 °F (36 1 °C)-98 3 °F (36 8 °C)] 97 °F (36 1 °C)  HR:  [] 95  Resp:  [15-18] 15  BP: (109-130)/(59-74) 130/74  No intake or output data in the 24 hours ending 06/17/21 0523    Lab Results:  [facility] Admission Reviewed     Current Facility-Administered Medications   Medication Dose Route Frequency Provider Last Rate    acetaminophen  650 mg Oral Q6H PRN Courtney Suarez MD      acetaminophen  650 mg Oral Q4H PRN Arcenio Bañuelos MD      acetaminophen  975 mg Oral Q6H PRN Arcenio Bañuelos MD      al mag oxide-diphenhydramine-lidocaine viscous  10 mL Swish & Swallow Q4H PRN Arcenio Bañuelos MD      albuterol  2 puff Inhalation Q6H PRN Arcenio Bañuelos MD      aluminum-magnesium hydroxide-simethicone  15 mL Oral Q4H PRN Arcenio Bañuelos MD      benztropine  1 mg Intramuscular Q4H PRN Max 6/day Arcenio Bañuelos MD      benztropine  1 mg Oral Q4H PRN Max 6/day Arcenio Bañuelos MD      benztropine  1 mg Oral BID Arcenio Bañuelos MD      calcium carbonate  500 mg Oral TID PRN Arcenio Bañuelos MD      Diclofenac Sodium  2 g Topical 4x Daily PRN Arcenio Bañuelos MD      diphenhydrAMINE  25 mg Oral HS Arcenio Bañuelos MD      fenofibrate  145 mg Oral Daily Arceino Bañuelos MD      gabapentin  600 mg Oral 4x Daily Arcenio Bañuelos MD      haloperidol  1 mg Oral 4x Daily Arcenio Bañuelos MD      haloperidol  5 mg Oral Q6H PRN Arcenio Bañuelos MD      hydrOXYzine HCL  25 mg Oral Q6H PRN Max 4/day Arcenio Bañuelos MD      hydrOXYzine HCL  50 mg Oral Q6H PRN Max 4/day Arcenio Bañuelos MD      lidocaine  1 patch Topical Daily Arcenio Bañuelos MD      LORazepam  1 mg Intramuscular Q6H PRSHONA Bañuelos MD      LORazepam  0 5 mg Oral 4x Daily Arcenio Bañuelos MD      magnesium hydroxide  30 mL Oral Daily PRN Arcenio Bañuelos MD      methocarbamol  500 mg Oral Q8H PRN Arcenio Bañuelos MD      nicotine  1 patch Transdermal Daily Arcenio Bañuelos MD      nicotine polacrilex  4 mg Oral Q2H PRSHONA Bañuelos MD      OLANZapine  2 5 mg Oral Q8H PRN Arcenio Bañuelos MD      OLANZapine  5 mg Oral Q3H PRN Arcenio Bañuelos MD      OLANZapine  7 5 mg Oral Q3H PRN Arcenio Bañuelos MD      OLANZapine  10 mg Intramuscular Q3H PRSHONA Bañuelos MD      OLANZapine  5 mg Intramuscular Q3H PRN Arcenio Bañuelos MD      OLANZapine  10 mg Oral HS Arcenio Bañuelos MD      OLANZapine  15 mg Oral Daily Arcenio Bañuelos MD      paliperidone palmitate ER  234 mg Intramuscular Q30 Days Arcenio Bañeulos MD      pantoprazole  40 mg Oral Early Morning Piotr Lopes Bjorn Sanders MD      Pramox-PE-Glycerin-Petrolatum  1 application Rectal 4x Daily PRN Magdalena Hatch MD      prazosin  1 mg Oral HS Magdalena Hatch MD      psyllium  1 packet Oral Daily Magdalena Hatch MD      sertraline  100 mg Oral Daily Magdalena Hatch MD         Counseling / Coordination of Care: Total floor / unit time spent today 15 minutes  Greater than 50% of total time was spent with the patient and / or family counseling and / or somewhat receptive to supportive listening and teaching positive coping skills to deal with symptom mangement  Patient's Rights, confidentiality and exceptions to confidentiality, use of automated medical record, [facility]ie [facility]bah staff access to medical record, and consent to treatment reviewed  This note was  not shared with the patient because it might further aggravate her psychiatric condition  This note has been dictated

## 2021-06-18 PROCEDURE — 99232 SBSQ HOSP IP/OBS MODERATE 35: CPT | Performed by: PSYCHIATRY & NEUROLOGY

## 2021-06-18 RX ADMIN — GABAPENTIN 600 MG: 300 CAPSULE ORAL at 08:17

## 2021-06-18 RX ADMIN — HALOPERIDOL 1 MG: 1 TABLET ORAL at 12:41

## 2021-06-18 RX ADMIN — PSYLLIUM HUSK 1 PACKET: 3.4 POWDER ORAL at 08:19

## 2021-06-18 RX ADMIN — LORAZEPAM 0.5 MG: 0.5 TABLET ORAL at 12:41

## 2021-06-18 RX ADMIN — BENZTROPINE MESYLATE 1 MG: 1 TABLET ORAL at 17:05

## 2021-06-18 RX ADMIN — GLYCERIN, PETROLATUM, PHENYLEPHRINE HCL, PRAMOXINE HCL 1 APPLICATION: 144; 2.5; 10; 15 CREAM TOPICAL at 19:47

## 2021-06-18 RX ADMIN — OLANZAPINE 15 MG: 5 TABLET, FILM COATED ORAL at 08:17

## 2021-06-18 RX ADMIN — GABAPENTIN 600 MG: 300 CAPSULE ORAL at 17:04

## 2021-06-18 RX ADMIN — GABAPENTIN 600 MG: 300 CAPSULE ORAL at 12:41

## 2021-06-18 RX ADMIN — HALOPERIDOL 1 MG: 1 TABLET ORAL at 21:03

## 2021-06-18 RX ADMIN — OLANZAPINE 10 MG: 10 TABLET, FILM COATED ORAL at 21:03

## 2021-06-18 RX ADMIN — GABAPENTIN 600 MG: 300 CAPSULE ORAL at 21:03

## 2021-06-18 RX ADMIN — PANTOPRAZOLE SODIUM 40 MG: 40 TABLET, DELAYED RELEASE ORAL at 06:05

## 2021-06-18 RX ADMIN — LIDOCAINE 1 PATCH: 50 PATCH CUTANEOUS at 08:19

## 2021-06-18 RX ADMIN — HALOPERIDOL 1 MG: 1 TABLET ORAL at 08:18

## 2021-06-18 RX ADMIN — PHENYTOIN 1 MG: 125 SUSPENSION ORAL at 21:03

## 2021-06-18 RX ADMIN — LORAZEPAM 0.5 MG: 0.5 TABLET ORAL at 17:05

## 2021-06-18 RX ADMIN — SERTRALINE HYDROCHLORIDE 100 MG: 100 TABLET ORAL at 08:15

## 2021-06-18 RX ADMIN — BENZTROPINE MESYLATE 1 MG: 1 TABLET ORAL at 08:19

## 2021-06-18 RX ADMIN — LORAZEPAM 0.5 MG: 0.5 TABLET ORAL at 08:18

## 2021-06-18 RX ADMIN — NICOTINE 1 PATCH: 21 PATCH, EXTENDED RELEASE TRANSDERMAL at 08:20

## 2021-06-18 RX ADMIN — DICLOFENAC SODIUM 2 G: 10 GEL TOPICAL at 19:47

## 2021-06-18 RX ADMIN — METHOCARBAMOL TABLETS 500 MG: 500 TABLET, COATED ORAL at 16:44

## 2021-06-18 RX ADMIN — LORAZEPAM 0.5 MG: 0.5 TABLET ORAL at 21:03

## 2021-06-18 RX ADMIN — DIPHENHYDRAMINE HCL 25 MG: 25 TABLET ORAL at 21:03

## 2021-06-18 RX ADMIN — HALOPERIDOL 1 MG: 1 TABLET ORAL at 17:05

## 2021-06-18 RX ADMIN — FENOFIBRATE 145 MG: 145 TABLET ORAL at 08:16

## 2021-06-18 NOTE — PROGRESS NOTES
Psychiatry Progress Note Paulie Hill [age] y o  female MRN: [id number]  Unit/Bed#: [facility] 112-01 Encounter: [id number]  Code Status: Level 1 - Full Code    PCP: Tanna Estrada DO    Date of Admission:  4/7/2021 1435   Date of Service:  06/18/21    Patient Active Problem List   Diagnosis    Schizoaffective disorder, bipolar type ([facility] )    Uncomplicated alcohol dependence ([facility] )    Suicidal behavior    LYNN (generalized anxiety disorder)    Slow transit constipation    Deficiency of vitamin B    Degenerative disc disease, lumbar    Post-traumatic stress disorder, chronic    Lower extremity weakness    Generalized abdominal pain    Non-intractable vomiting    Medical clearance for psychiatric admission    Carbuncle    Alcohol dependence with unspecified alcohol-induced disorder ([facility] )    Mild intermittent asthma without complication    Tobacco abuse    Ear problem, bilateral    Gastroesophageal reflux disease    Right foot pain    Ear problem, right     Review of systems:         unremarkable  Diagnosis:          Schizoaffective bipolar, alcohol use disorder in early remission in controlled environment  Assessment   Overall Status:         Again was caught yelling yesterday 1 time at night in still somewhat delusional but usually friendly and pleasant   Certification Statement: The patient will continue to require additional inpatient hospital stay due to ongoing mood swings paranoia   Acceptance by patient:  accepting    Hopefulness in recovery:  Living in a group home   Understanding of medications: has good understanding   Involved in reintegration process:  Adjusting to the unit   Trusting in relationship with psychiatrist:  trusting   Justification for dual anti-psychotics: due to lack of response to single antipsychotics   Side effects from treatment: none    Medication changes   None  today  Medication Education;  Risks and S/E and precautions of all meds given to patient and she did verbalize an understanding   Non-pharmacological treatments   Continue with individual, group, milieu and occupational therapy using recovery principles and psycho-education about accepting illness and the need for treatment  Safety   Safety and communication plan established to target dynamic risk factors discussed above  Discharge Plan     most likely to a group home with the act team again     Interval Progress       Was found yelling again once in the night time and was redirected by staff  Continues to be delusional about people stalking her who live upstairs and talking to her through the vents on the ceiling  Continues to report that messages from TV tell her that the feds are after her  She still accuses staff of hypnotizing her  Remains overtly friendly and too excited but redirectable  Does attend most of the groups and reports no voices lately  She cannot verbalize why she was screaming last night   Willing now to wait it out for a group home rather than go back to previous residence     Sleep:                      good   appetite:                   good   compliance with medications :   compliant   Side  Effects:                               none   significant events:                       Still screams at times   group attendance:                      Attending most of the time    Mental Status Exam  Appearance: age appropriate, casually dressed, looks older than stated age, underweight        Behavior: normal, pleasant, cooperative, appears anxious, mildly anxious,  more friendly today and not confrontational     Speech: fluent, clear, coherent, increased rate, pressured, hypertalkative,   Circumstantial pressured    Mood: depressed, anxious, euphoric  Affect: labile, reactive, slightly brighter   delete   Thought Process: normal abstract reasoning, less prominent, tends to be pressured and circumstantial and preoccupied perseverating on discharge    Thought Content: some paranoia, ideas of reference, but does appear as if paranoid  No current suicidal homicidal thoughts intent or plans verbalized  No phobias obsessions compulsions or distorted body perceptions elicited  Also believes TV is sending her messages from movie stars checking her if she is good or bad and threatening to get the feds to go after her  Perceptual Disturbances: no auditory hallucinations, no visual hallucinations, denies auditory hallucinations when asked, does not appear responding to internal stimuli, no voices reported today    Continues to hear voices coming through the vent and hearing messages from TV but not commanding   Hx Risk Factors: chronic mood disorder, chronic psychotic symptoms, alcohol use, limted insight  Sensorium:       Oriented to 3 spheres and to situation  Cognition: recent and remote memory grossly intact  Consciousness: alert and awake  Attention: attention span and concentration are age appropriate  Intellect: appears to be of average intelligence  Insight: improving  Judgement: improving  Motor Activity: no abnormal movements     Vitals  Temp:  [98 °F (36 7 °C)] 98 °F (36 7 °C)  HR:  [] 113  Resp:  [19] 19  BP: (112-124)/(72-74) 112/72  No intake or output data in the 24 hours ending 06/18/21 0826    Lab Results:  [facility] Admission Reviewed     Current Facility-Administered Medications   Medication Dose Route Frequency Provider Last Rate    acetaminophen  650 mg Oral Q6H PRN Geoff Cowan MD      acetaminophen  650 mg Oral Q4H PRN Geoff Cowan MD      acetaminophen  975 mg Oral Q6H PRN Geoff Cowan MD      al mag oxide-diphenhydramine-lidocaine viscous  10 mL Swish & Swallow Q4H PRN Geoff Cowan MD      albuterol  2 puff Inhalation Q6H PRN Geoff Cowan MD      aluminum-magnesium hydroxide-simethicone  15 mL Oral Q4H PRN Geoff Cowan MD      benztropine  1 mg Intramuscular Q4H PRN Max 6/day Geoff Cowan MD      benztropine  1 mg Oral Q4H PRN Max 6/day Ana Marte MD      benztropine  1 mg Oral BID Ana Marte MD      calcium carbonate  500 mg Oral TID PRN Ana Marte MD      Diclofenac Sodium  2 g Topical 4x Daily PRN Ana Marte MD      diphenhydrAMINE  25 mg Oral HS Ana Marte MD      fenofibrate  145 mg Oral Daily Ana Marte MD      gabapentin  600 mg Oral 4x Daily Ana Marte MD      haloperidol  1 mg Oral 4x Daily Ana Marte MD      haloperidol  5 mg Oral Q6H PRN Ana Marte MD      hydrOXYzine HCL  25 mg Oral Q6H PRN Max 4/day Ana Marte MD      hydrOXYzine HCL  50 mg Oral Q6H PRN Max 4/day Ana Marte MD      lidocaine  1 patch Topical Daily Ana Marte MD      LORazepam  1 mg Intramuscular Q6H PARISN Ana Marte MD      LORazepam  0 5 mg Oral 4x Daily Ana Marte MD      magnesium hydroxide  30 mL Oral Daily PRN Ana Marte MD      methocarbamol  500 mg Oral Q8H PRN Ana Marte MD      nicotine  1 patch Transdermal Daily Ana Marte MD      nicotine polacrilex  4 mg Oral Q2H PRN Ana Marte MD      OLANZapine  2 5 mg Oral Q8H PRN Ana Marte MD      OLANZapine  5 mg Oral Q3H PRN Ana Marte MD      OLANZapine  7 5 mg Oral Q3H PRN Ana Marte MD      OLANZapine  10 mg Intramuscular Q3H PRN Ana Marte MD      OLANZapine  5 mg Intramuscular Q3H PRN Ana Marte MD      OLANZapine  10 mg Oral HS Ana Marte MD      OLANZapine  15 mg Oral Daily Ana Marte MD      paliperidone palmitate ER  234 mg Intramuscular Q30 Days Ana Marte MD      pantoprazole  40 mg Oral Early Morning Ana Marte MD      Pramox-PE-Glycerin-Petrolatum  1 application Rectal 4x Daily PRN Ana Marte MD      prazosin  1 mg Oral HS Ana Marte MD      psyllium  1 packet Oral Daily Ana Marte MD      sertraline  100 mg Oral Daily Ana Marte MD         Counseling / Coordination of Care: Total floor / unit time spent today 15 minutes   Greater than 50% of total time was spent with the patient and / or family counseling and / or somewhat receptive to supportive listening and teaching positive coping skills to deal with symptom mangement  Patient's Rights, confidentiality and exceptions to confidentiality, use of automated medical record, [facility]ie Atkinson staff access to medical record, and consent to treatment reviewed  This note was  not shared with the patient because it might further aggravate her psychiatric condition  This note has been dictated

## 2021-06-18 NOTE — PROGRESS NOTES
Progress Note - Behavioral Health     Stefano Salazar 49 y o  female MRN: [id number]   Unit/Bed#: [facility] [address] Encounter: [id number]    Per Nursing: Nursing reports that patient has been intermittently visible on the unit  There have not been any behavioral outbursts on the unit, no episodes of yelling or screaming  She has denied any anxiety or depression  She has been compliant with meals and medications  Per Patient: Patient states that she slept very well last evening  She denies any anxiety or depression  She reports that she is feeling better overall  She states that she is feeling good  She states that she does not have any thoughts of wanting to harm herself  She denies any active or passive suicidal ideation, intent or plan  She denies any auditory or visual hallucinations  She states, "no, I haven't been" having any hallucinations in a while  She believes that she is getting better since first arriving on the unit  Behavior over the last 24 hours: improving  Sleep: normal  Appetite: normal  Medication side effects: No   ROS: no complaints, all other systems are negative  Any positives in the Comprehensive Review of Systems were noted in the HPI  All other Review of Systems were negative          Mental Status Evaluation:    Appearance:  adequate grooming, wearing a robe, wearing pajamas   Behavior:  normal, pleasant, cooperative, calm   Speech:  normal rate and volume   Mood:  normal, improved, euthymic   Affect:  normal range and intensity   Thought Process:  organized, logical, coherent, goal directed   Associations: intact associations   Thought Content:  no overt delusions   Perceptual Disturbances: denies auditory hallucinations when asked   Risk Potential: Suicidal ideation - None at present  Homicidal ideation - None at present  Potential for aggression - Not at present   Sensorium:  oriented to person, place and time/date   Memory:  recent and remote memory grossly intact   Consciousness: alert and awake   Attention: attention span and concentration are age appropriate   Insight:  improving   Judgment: improving   Gait/Station: normal gait/station   Motor Activity: no abnormal movements     Vital signs in last 24 hours:  Vitals:    06/19/21 0713   BP: 97/59   Pulse: (!) 109   Resp: 18   Temp: 97 5 °F (36 4 °C)   SpO2:          Laboratory results: I have personally reviewed all pertinent laboratory/tests results  Progress Toward Goals: progressing    Assessment/Plan   Principal Problem:    Schizoaffective disorder, bipolar type (HCC)  Active Problems:    Post-traumatic stress disorder, chronic    Medical clearance for psychiatric admission    Mild intermittent asthma without complication    Ear problem, bilateral    Gastroesophageal reflux disease    Right foot pain    Ear problem, right    Recommended Treatment:     Planned medication and treatment changes:     All current active medications have been reviewed  Encourage group therapy, milieu therapy and occupational therapy  Behavioral Health checks every 7 minutes  Continue current medications:  Current Facility-Administered Medications   Medication Dose Route Frequency Provider Last Rate    acetaminophen  650 mg Oral Q6H PRN Jayne Chapman MD      acetaminophen  650 mg Oral Q4H PRN Jayne Chapman MD      acetaminophen  975 mg Oral Q6H PRN Jayne Chapman MD      al mag oxide-diphenhydramine-lidocaine viscous  10 mL Swish & Swallow Q4H PRN Jayne Chapman MD      albuterol  2 puff Inhalation Q6H PRN Jayne Chapman MD      aluminum-magnesium hydroxide-simethicone  15 mL Oral Q4H PRN Jayne Chapman MD      benztropine  1 mg Intramuscular Q4H PRN Max 6/day Jayne Chapman MD      benztropine  1 mg Oral Q4H PRN Max 6/day Jayne Chapman MD      benztropine  1 mg Oral BID Jayne Chapman MD      calcium carbonate  500 mg Oral TID PRN Jayne Chapman MD      Diclofenac Sodium  2 g Topical 4x Daily PRN Jayne Chapman MD      diphenhydrAMINE  25 mg Oral HS Jayne Chapman MD      fenofibrate  145 mg Oral Daily Sara Diaz MD      gabapentin  600 mg Oral 4x Daily Sara Diaz MD      haloperidol  1 mg Oral 4x Daily Sara Diaz MD      haloperidol  5 mg Oral Q6H PRN Sara Diaz MD      hydrOXYzine HCL  25 mg Oral Q6H PRN Max 4/day Sara Diaz MD      hydrOXYzine HCL  50 mg Oral Q6H PRN Max 4/day Saar Diaz MD      lidocaine  1 patch Topical Daily Sara Diaz MD      LORazepam  1 mg Intramuscular Q6H PRN Sara Diaz MD      LORazepam  0 5 mg Oral 4x Daily Sara Diaz MD      magnesium hydroxide  30 mL Oral Daily PRN Sara Diaz MD      methocarbamol  500 mg Oral Q8H PRN Sara Diaz MD      nicotine  1 patch Transdermal Daily Sara Diaz MD      nicotine polacrilex  4 mg Oral Q2H PRN Sara Diaz MD      OLANZapine  2 5 mg Oral Q8H PRN Sara Diaz MD      OLANZapine  5 mg Oral Q3H PRN Sara Diaz MD      OLANZapine  7 5 mg Oral Q3H PRN Sara Diaz MD      OLANZapine  10 mg Intramuscular Q3H PRN Sara Diaz MD      OLANZapine  5 mg Intramuscular Q3H PRN Sara Diaz MD      OLANZapine  10 mg Oral HS Sara Diaz MD      OLANZapine  15 mg Oral Daily Sara Diaz MD      paliperidone palmitate ER  234 mg Intramuscular Q30 Days Sara Diaz MD      pantoprazole  40 mg Oral Early Morning Sara Diaz MD      Pramox-PE-Glycerin-Petrolatum  1 application Rectal 4x Daily PRN Sara Diaz MD      prazosin  1 mg Oral HS Sara Diaz MD      psyllium  1 packet Oral Daily Sara Diaz MD      sertraline  100 mg Oral Daily Sara Diaz MD             Plan:  1) Patient reports she is feeling better compared to first being admitted on the unit  She no longer has auditory hallucinations and reports that her mood has been better  She is feeling more positive overall  She does not appear to be in any acute distress at this time    2) Continue all current medications as directed:    Cogentin 1 mg twice daily   Benadryl 25 mg once daily at bedtime   Gabapentin 600 mg four times daily   Haldol 1 mg four times daily   Ativan 0 5 mg four times daily   Zyprexa 15 mg once daily in the morning and 10 mg once daily at bedtime  [facility] IM Injection, 234 mg, every 30 days, last administered 6/17/2021   Prazosin 1 mg once daily at bedtime   Zoloft 100 mg once daily  3) Medical   All medical comorbidities are under the care of [facility] Internal Medicine Service  4) Continue to work with Case Management to determine patient's disposition following discharge  Risks / Benefits of Treatment:    Risks, benefits, and possible side effects of medications explained to patient and patient verbalizes understanding and agreement for treatment  Counseling / Coordination of Care:    Patient's progress reviewed with nursing staff  Medications, treatment progress and treatment plan reviewed with patient      Elizabeth Wynne PA-C 06/19/21

## 2021-06-18 NOTE — CASE MANAGEMENT
06/18/21 0913   Team Meeting   Meeting Type Daily Rounds   Initial Conference Date 06/18/21   Team Members Present   Team Members Present Physician;Nurse;;; Other (Discipline and Name)   Physician Team Member Dr Candice Adler Management Team Member [address] [place] [address] Work Team Member Mandi House   Other (Discipline and Name) Sterling Chavez   Patient/Family Present   Patient Present No   Patient's Family Present No     Case reviewed: slept, 1 episode of yelling on evening shift last night, visible, med and meal compliant, c/o ankle pain

## 2021-06-18 NOTE — NURSING NOTE
Alert, cooperative and visible intermittently  No SI or HI noted  Denies depression, anxiety and pain  Consumed 100% of breakfast and 100% of lunch  Took all medication without prompting  Maintained on safe precautions without incident   Will continue to monitor progress and recovery program

## 2021-06-18 NOTE — PLAN OF CARE
Problem: Nutrition/Hydration-ADULT  Goal: Nutrient/Hydration intake appropriate for improving, restoring or maintaining nutritional needs  Description: Monitor and assess patient's nutrition/hydration status for malnutrition  Collaborate with interdisciplinary team and initiate plan and interventions as ordered  Monitor patient's weight and dietary intake as ordered or per policy  Utilize nutrition screening tool and intervene as necessary  Determine patient's food preferences and provide high-protein, high-caloric foods as appropriate       INTERVENTIONS:  - Monitor oral intake, urinary output, labs, and treatment plans  - Assess nutrition and hydration status and recommend course of action  - Evaluate amount of meals eaten  - Assist patient with eating if necessary   - Allow adequate time for meals  - Recommend/ encourage appropriate diets, oral nutritional supplements, and vitamin/mineral supplements  - Order, calculate, and assess calorie counts as needed  - Recommend, monitor, and adjust tube feedings and TPN/PPN based on assessed needs  - Assess need for intravenous fluids  - Provide specific nutrition/hydration education as appropriate  - Include patient/family/caregiver in decisions related to nutrition  Outcome: Progressing     Problem: Alteration in Thoughts and Perception  Goal: Refrain from acting on delusional thinking/internal stimuli  Description: Interventions:  - Monitor patient closely, per order   - Utilize least restrictive measures   - Set reasonable limits, give positive feedback for acceptable   - Administer medications as ordered and monitor of potential side effects  Outcome: Progressing  Goal: Attend and participate in unit activities, including therapeutic, recreational, and educational groups  Description: Interventions:  -Encourage Visitation and family involvement in care  Outcome: Progressing  Goal: Complete daily ADLs, including personal hygiene independently, as able  Description: Interventions:  - Observe, teach, and assist patient with ADLS  - Monitor and promote a balance of rest/activity, with adequate nutrition and elimination   Outcome: Progressing     Problem: Ineffective Coping  Goal: Participates in unit activities  Description: Interventions:  - Provide therapeutic environment   - Provide required programming   - Redirect inappropriate behaviors   Outcome: Progressing     Problem: Risk for Self Injury/Neglect  Goal: Verbalize thoughts and feelings  Description: Interventions:  - Assess and re-assess patient's lethality and potential for self-injury  - Engage patient in 1:1 interactions, daily, for a minimum of 15 minutes  - Encourage patient to express feelings, fears, frustrations, hopes  - Establish rapport/trust with patient   Outcome: Progressing     Problem: Depression  Goal: Refrain from isolation  Description: Interventions:  - Develop a trusting relationship   - Encourage socialization   Outcome: Progressing     Problem: Anxiety  Goal: Anxiety is at manageable level  Description: Interventions:  - Assess and monitor patient's anxiety level  - Monitor for signs and symptoms (heart palpitations, chest pain, shortness of breath, headaches, nausea, feeling jumpy, restlessness, irritable, apprehensive)  - Collaborate with interdisciplinary team and initiate plan and interventions as ordered    - [place] patient to unit/surroundings  - Explain treatment plan  - Encourage participation in care  - Encourage verbalization of concerns/fears  - Identify coping mechanisms  - Assist in developing anxiety-reducing skills  - Administer/offer alternative therapies  - Limit or eliminate stimulants  Outcome: Progressing     Problem: Risk for Violence/Aggression Toward Others  Goal: Control angry outbursts  Description: Interventions:  - Monitor patient closely, per order  - Ensure early verbal de-escalation  - Monitor prn medication needs  - Set reasonable/therapeutic limits, outline behavioral expectations, and consequences   - Provide a non-threatening milieu, utilizing the least restrictive interventions   Outcome: Progressing     Problem: Alteration in Orientation  Goal: Interact with staff daily  Description: Interventions:  - Assess and re-assess patient's level of orientation  - Engage patient in 1 on 1 interactions, daily, for a minimum of 15 minutes   - Establish rapport/trust with patient   Outcome: Progressing

## 2021-06-18 NOTE — CASE MANAGEMENT
Left another VM for Elza Faustin @[facility]/ [phone]  and [phone] (C) to get status update on pt's placement

## 2021-06-18 NOTE — PROGRESS NOTES
06/18/21 1100   Activity/Group Checklist   Group Other (Comment)  (Recovery Management )   Attendance Attended   Attendance Duration (min) 46-60   Interactions Interacted appropriately   Affect/Mood Appropriate   Goals Achieved Identified relapse prevention strategies; Discussed discharge plans; Able to reflect/comment on own behavior;Able to self-disclose

## 2021-06-18 NOTE — NURSING NOTE
Lucas León has been visible intermittently in the milieu  She made a couple phone calls  No episodes of yelling or screaming tonight  Minimal interaction with peers  Denies anxiety and depression  Robaxin 500mg po at 1644 for 6/10 muscle spasms in her left leg  "It comes from my back issues"  Relief to 1/10 about 1 hour later  Ambulating without distress  Took medication as scheduled  Ate 100% of her meal  Went out on the deck for fresh air during Therapeutic Activity group  At 1947 was given Preparation H per request  Voltaren gel also given at 1947 for foot pain bilaterally  Attended Wrap up group  Ate snack  Took HS medication  Continue to monitor  Precautions maintained

## 2021-06-19 PROCEDURE — 99232 SBSQ HOSP IP/OBS MODERATE 35: CPT | Performed by: PHYSICIAN ASSISTANT

## 2021-06-19 RX ADMIN — GABAPENTIN 600 MG: 300 CAPSULE ORAL at 21:18

## 2021-06-19 RX ADMIN — DIPHENHYDRAMINE HCL 25 MG: 25 TABLET ORAL at 21:19

## 2021-06-19 RX ADMIN — LIDOCAINE 1 PATCH: 50 PATCH CUTANEOUS at 08:34

## 2021-06-19 RX ADMIN — HALOPERIDOL 1 MG: 1 TABLET ORAL at 12:25

## 2021-06-19 RX ADMIN — DICLOFENAC SODIUM 2 G: 10 GEL TOPICAL at 20:09

## 2021-06-19 RX ADMIN — DICLOFENAC SODIUM 2 G: 10 GEL TOPICAL at 11:05

## 2021-06-19 RX ADMIN — HALOPERIDOL 1 MG: 1 TABLET ORAL at 18:00

## 2021-06-19 RX ADMIN — METHOCARBAMOL TABLETS 500 MG: 500 TABLET, COATED ORAL at 15:49

## 2021-06-19 RX ADMIN — PSYLLIUM HUSK 1 PACKET: 3.4 POWDER ORAL at 08:36

## 2021-06-19 RX ADMIN — FENOFIBRATE 145 MG: 145 TABLET ORAL at 08:36

## 2021-06-19 RX ADMIN — BENZTROPINE MESYLATE 1 MG: 1 TABLET ORAL at 18:00

## 2021-06-19 RX ADMIN — NICOTINE 1 PATCH: 21 PATCH, EXTENDED RELEASE TRANSDERMAL at 08:34

## 2021-06-19 RX ADMIN — HALOPERIDOL 1 MG: 1 TABLET ORAL at 08:37

## 2021-06-19 RX ADMIN — HALOPERIDOL 1 MG: 1 TABLET ORAL at 21:18

## 2021-06-19 RX ADMIN — LORAZEPAM 0.5 MG: 0.5 TABLET ORAL at 21:18

## 2021-06-19 RX ADMIN — SERTRALINE HYDROCHLORIDE 100 MG: 100 TABLET ORAL at 08:36

## 2021-06-19 RX ADMIN — GLYCERIN, PETROLATUM, PHENYLEPHRINE HCL, PRAMOXINE HCL 1 APPLICATION: 144; 2.5; 10; 15 CREAM TOPICAL at 20:09

## 2021-06-19 RX ADMIN — LORAZEPAM 0.5 MG: 0.5 TABLET ORAL at 08:37

## 2021-06-19 RX ADMIN — GABAPENTIN 600 MG: 300 CAPSULE ORAL at 18:00

## 2021-06-19 RX ADMIN — GABAPENTIN 600 MG: 300 CAPSULE ORAL at 12:24

## 2021-06-19 RX ADMIN — OLANZAPINE 10 MG: 10 TABLET, FILM COATED ORAL at 21:19

## 2021-06-19 RX ADMIN — PANTOPRAZOLE SODIUM 40 MG: 40 TABLET, DELAYED RELEASE ORAL at 06:12

## 2021-06-19 RX ADMIN — LORAZEPAM 0.5 MG: 0.5 TABLET ORAL at 12:25

## 2021-06-19 RX ADMIN — OLANZAPINE 15 MG: 5 TABLET, FILM COATED ORAL at 08:36

## 2021-06-19 RX ADMIN — PHENYTOIN 1 MG: 125 SUSPENSION ORAL at 21:18

## 2021-06-19 RX ADMIN — LORAZEPAM 0.5 MG: 0.5 TABLET ORAL at 18:00

## 2021-06-19 RX ADMIN — GABAPENTIN 600 MG: 300 CAPSULE ORAL at 08:36

## 2021-06-19 RX ADMIN — BENZTROPINE MESYLATE 1 MG: 1 TABLET ORAL at 08:37

## 2021-06-19 RX ADMIN — DICLOFENAC SODIUM 2 G: 10 GEL TOPICAL at 15:50

## 2021-06-19 NOTE — NURSING NOTE
In bed eyes closed appears to be sleeping body shifting and breath sounds noted will continue to monitor

## 2021-06-19 NOTE — NURSING NOTE
Pt is medication and meal compliant  Pt is visible in the milieu and social with select peers  Pt reports anxiety, but has not been yelling out and remains calm using coping skills of coloring and word puzzles needing no PRN medications at this time  Pt denies all other s/s currently  Will continue to monitor

## 2021-06-19 NOTE — NURSING NOTE
Patient c/o bilateral foot pain  Requested/received Robaxin and Volteran Gel  Received both at   And stated that pain greatly dimished within the hour  She was calm and pleasant and visible this evening  At 2006 she requested Volteran Gel for bilateral foot pain and Preparation H for rectal discomfort  There have been no verbal outbursts   She denies hearing any voices in the room above; she denies any auditory hallucinations

## 2021-06-19 NOTE — PROGRESS NOTES
Progress Note - Behavioral Health     Bettie Ratel 49 y o  female MRN: [id number]   Unit/Bed#: [facility]FREDDY LOU [facility] 686-28 Encounter: [id number]    Per Nursing: Nursing reports that patient has been visible on the unit, and social with select peers  She has not been yelling out or exhibiting any overt problematic behaviors  She has been utilizing her coping skills of coloring and word puzzles  She has been compliant with her meals and medications  Per Patient: Patient states that she is doing well overall  She states that she feels good  She reports that she is not depressed or anxious  She denies any active or passive suicidal ideation, intent or plan  She denies any auditory or visual hallucinations  She states that she feels much better since her initial arrival on the unit  Later, however, she is seen becoming more intrusive and demanding at the nurses' station, repeatedly requesting to have her bin and other belongings, and accusing another peer of taking one of her possessions  She did require a few attempts at redirection but was not acutely agitated  Behavior over the last 24 hours: unchanged  Sleep: normal  Appetite: normal  Medication side effects: No   ROS: no complaints, all other systems are negative  Any positives in the Comprehensive Review of Systems were noted in the HPI  All other Review of Systems were negative          Mental Status Evaluation:    Appearance:  looks older than stated age, wearing a robe, wearing pajamas   Behavior:  normal, pleasant, cooperative, calm, demanding   Speech:  normal rate and volume   Mood:  normal, improved, euthymic   Affect:  normal range and intensity, does become irritable with nursing staff   Thought Process:  goal directed   Associations: intact associations   Thought Content:  no overt delusions   Perceptual Disturbances: denies auditory hallucinations when asked   Risk Potential: Suicidal ideation - None at present  Homicidal ideation - None at present  Potential for aggression - Not at present   Sensorium:  oriented to person, place and time/date   Memory:  recent and remote memory grossly intact   Consciousness:  alert and awake   Attention: attention span and concentration appear shorter than expected for age   Insight:  limited   Judgment: limited   Gait/Station: normal gait/station   Motor Activity: no abnormal movements     Vital signs in last 24 hours:  Vitals:    06/20/21 0229   BP: 104/70   Pulse: 94   Resp: 18   Temp: 97 5 °F (36 4 °C)   SpO2: 94%         Laboratory results: I have personally reviewed all pertinent laboratory/tests results  Progress Toward Goals: progressing    Assessment/Plan   Principal Problem:    Schizoaffective disorder, bipolar type ([facility])  Active Problems:    Post-traumatic stress disorder, chronic    Medical clearance for psychiatric admission    Mild intermittent asthma without complication    Ear problem, bilateral    Gastroesophageal reflux disease    Right foot pain    Ear problem, right    Recommended Treatment:     Planned medication and treatment changes:     All current active medications have been reviewed  Encourage group therapy, milieu therapy and occupational therapy  Behavioral Health checks every 7 minutes  Continue current medications:  Current Facility-Administered Medications   Medication Dose Route Frequency Provider Last Rate    acetaminophen  650 mg Oral Q6H PRN Arcenio Bañuelos MD      acetaminophen  650 mg Oral Q4H PRN Arcenio Bañuelos MD      acetaminophen  975 mg Oral Q6H PRN Arcenio Bañuelos MD      al mag oxide-diphenhydramine-lidocaine viscous  10 mL Swish & Swallow Q4H PRN Arcenio Bañuelos MD      albuterol  2 puff Inhalation Q6H PRN Arcenio Bañuelos MD      aluminum-magnesium hydroxide-simethicone  15 mL Oral Q4H PRN Arcenio Bañuelos MD      benztropine  1 mg Intramuscular Q4H PRN Max 6/day Arcenio Bañuelos MD      benztropine  1 mg Oral Q4H PRN Max 6/day Arcenio Bañuelos MD      benztropine  1 mg Oral BID MD Ignacia Uriarte calcium carbonate  500 mg Oral TID PRN Shefali , MD      Diclofenac Sodium  2 g Topical 4x Daily PRN Shefali , MD      diphenhydrAMINE  25 mg Oral HS Shefali , MD      fenofibrate  145 mg Oral Daily Shefali , MD      gabapentin  600 mg Oral 4x Daily Shefali , MD      haloperidol  1 mg Oral 4x Daily Shefali , MD      haloperidol  5 mg Oral Q6H PRN Shefali , MD      hydrOXYzine HCL  25 mg Oral Q6H PRN Max 4/day Shefali , MD      hydrOXYzine HCL  50 mg Oral Q6H PRN Max 4/day Shefali , MD      lidocaine  1 patch Topical Daily Shefali , MD      LORazepam  1 mg Intramuscular Q6H PRN Shefali , MD      LORazepam  0 5 mg Oral 4x Daily Shefali , MD      magnesium hydroxide  30 mL Oral Daily PRN Shefali , MD      methocarbamol  500 mg Oral Q8H PRN Shefali , MD      nicotine  1 patch Transdermal Daily Shefali , MD      nicotine polacrilex  4 mg Oral Q2H PRN Shefali , MD      OLANZapine  2 5 mg Oral Q8H PRN Shefali , MD      OLANZapine  5 mg Oral Q3H PRN Shefali , MD      OLANZapine  7 5 mg Oral Q3H PRN Shefali , MD      OLANZapine  10 mg Intramuscular Q3H PRN Shefali , MD      OLANZapine  5 mg Intramuscular Q3H PRN Shefali , MD      OLANZapine  10 mg Oral HS Shefali , MD      OLANZapine  15 mg Oral Daily Shefali , MD      paliperidone palmitate ER  234 mg Intramuscular Q30 Days Shefali , MD      pantoprazole  40 mg Oral Early Morning Shefali , MD      Pramox-PE-Glycerin-Petrolatum  1 application Rectal 4x Daily PRN Shefali , MD      prazosin  1 mg Oral HS Shefali , MD      psyllium  1 packet Oral Daily Shefali , MD      sertraline  100 mg Oral Daily Shefali , MD             Plan:  1) Patient reports that she is feeling better overall since first arriving on the unit  She denies any anxiety or depression   She is pleasant during interaction with provider, however she is later observed being slightly intrusive and demanding with nursing staff  She is not in any acute distress and is not acutely agitated at this time  2) Continue all current medications as directed:    Cogentin 1 mg twice daily   Benadryl 25 mg once daily at bedtime   Gabapentin 600 mg four times daily   Haldol 1 mg four times daily   Ativan 0 5 mg four times daily   Zyprexa 15 mg once daily in the morning and 10 mg once daily at bedtime  Miriam Morelle ER IM injection, 234 mg, every 30 days, last administered 6/17/2021   Prazosin 1 mg once daily at bedtime   Zoloft 100 mg once daily  3) Medical   All medical comorbidities are under the care of [facility] Internal Medicine Service  4) Continue to work with Case Management to determine patient's disposition following discharge  Risks / Benefits of Treatment:    Risks, benefits, and possible side effects of medications explained to patient and patient verbalizes understanding and agreement for treatment  Counseling / Coordination of Care:    Patient's progress reviewed with nursing staff  Medications, treatment progress and treatment plan reviewed with patient      Gabby Edgar PA-C 06/20/21

## 2021-06-20 PROCEDURE — 99232 SBSQ HOSP IP/OBS MODERATE 35: CPT | Performed by: PHYSICIAN ASSISTANT

## 2021-06-20 RX ADMIN — LIDOCAINE 1 PATCH: 50 PATCH CUTANEOUS at 10:33

## 2021-06-20 RX ADMIN — HALOPERIDOL 1 MG: 1 TABLET ORAL at 17:10

## 2021-06-20 RX ADMIN — HALOPERIDOL 1 MG: 1 TABLET ORAL at 13:00

## 2021-06-20 RX ADMIN — BENZTROPINE MESYLATE 1 MG: 1 TABLET ORAL at 08:30

## 2021-06-20 RX ADMIN — ACETAMINOPHEN 975 MG: 325 TABLET ORAL at 15:52

## 2021-06-20 RX ADMIN — HALOPERIDOL 1 MG: 1 TABLET ORAL at 21:34

## 2021-06-20 RX ADMIN — PANTOPRAZOLE SODIUM 40 MG: 40 TABLET, DELAYED RELEASE ORAL at 06:20

## 2021-06-20 RX ADMIN — NICOTINE 1 PATCH: 21 PATCH, EXTENDED RELEASE TRANSDERMAL at 10:34

## 2021-06-20 RX ADMIN — GABAPENTIN 600 MG: 300 CAPSULE ORAL at 13:00

## 2021-06-20 RX ADMIN — GABAPENTIN 600 MG: 300 CAPSULE ORAL at 08:29

## 2021-06-20 RX ADMIN — LORAZEPAM 0.5 MG: 0.5 TABLET ORAL at 17:10

## 2021-06-20 RX ADMIN — GABAPENTIN 600 MG: 300 CAPSULE ORAL at 21:32

## 2021-06-20 RX ADMIN — LORAZEPAM 0.5 MG: 0.5 TABLET ORAL at 21:34

## 2021-06-20 RX ADMIN — PHENYTOIN 1 MG: 125 SUSPENSION ORAL at 21:32

## 2021-06-20 RX ADMIN — OLANZAPINE 15 MG: 5 TABLET, FILM COATED ORAL at 08:29

## 2021-06-20 RX ADMIN — DICLOFENAC SODIUM 2 G: 10 GEL TOPICAL at 15:53

## 2021-06-20 RX ADMIN — PSYLLIUM HUSK 1 PACKET: 3.4 POWDER ORAL at 08:29

## 2021-06-20 RX ADMIN — OLANZAPINE 10 MG: 10 TABLET, FILM COATED ORAL at 21:34

## 2021-06-20 RX ADMIN — LORAZEPAM 0.5 MG: 0.5 TABLET ORAL at 08:29

## 2021-06-20 RX ADMIN — GABAPENTIN 600 MG: 300 CAPSULE ORAL at 17:09

## 2021-06-20 RX ADMIN — MAGNESIUM HYDROXIDE 30 ML: 400 SUSPENSION ORAL at 21:54

## 2021-06-20 RX ADMIN — FENOFIBRATE 145 MG: 145 TABLET ORAL at 08:29

## 2021-06-20 RX ADMIN — SERTRALINE HYDROCHLORIDE 100 MG: 100 TABLET ORAL at 08:30

## 2021-06-20 RX ADMIN — GLYCERIN, PETROLATUM, PHENYLEPHRINE HCL, PRAMOXINE HCL 1 APPLICATION: 144; 2.5; 10; 15 CREAM TOPICAL at 10:34

## 2021-06-20 RX ADMIN — DIPHENHYDRAMINE HCL 25 MG: 25 TABLET ORAL at 21:32

## 2021-06-20 RX ADMIN — BENZTROPINE MESYLATE 1 MG: 1 TABLET ORAL at 17:09

## 2021-06-20 RX ADMIN — DICLOFENAC SODIUM 2 G: 10 GEL TOPICAL at 10:34

## 2021-06-20 RX ADMIN — HALOPERIDOL 1 MG: 1 TABLET ORAL at 08:29

## 2021-06-20 RX ADMIN — LORAZEPAM 0.5 MG: 0.5 TABLET ORAL at 13:00

## 2021-06-20 NOTE — PROGRESS NOTES
Anesthesia Evaluation      Patient summary reviewed   No history of anesthetic complications     Airway   Mallampati: I  Neck ROM: full   Pulmonary - negative ROS and normal exam    breath sounds clear to auscultation                         Cardiovascular - negative ROS and normal exam   Neuro/Psych - negative ROS     Endo/Other - negative ROS      GI/Hepatic/Renal - negative ROS           Dental - normal exam   (+) caps                       Anesthesia Plan  Planned anesthetic: MAC    ASA 1   Induction: intravenous   Anesthetic plan and risks discussed with: patient  Anesthesia plan special considerations: antiemetics,   Post-op plan: routine recovery        Results for orders placed or performed during the hospital encounter of 08/29/18   POCT Pregnancy (Beta-hCG, Qual), Urine (Point of Care) on DOS   Result Value Ref Range    POC Preg, Urine Negative Negative    POCt Kit Lot Number [id number]     POCT Kit Expiration Date 1/20         Pt approached this nurse and reported that there was a lady in the vent that was asking for help and was saying that she had a rope around her ankle and couldn't escape from the man  This nurse walked down to the room to investigate and there was no voices heard

## 2021-06-20 NOTE — NURSING NOTE
Sabrina Mcmanus has been pleasant, cooperative and visible intermittently throughout the shift  No episodes of yelling  Behavior controlled  Ate 100% of her meals  Took medication without an issue  Lidoderm patch to lower back and Nicotine patch to her left arm after her shower this AM  Given Voltaren gel for pain in her feet at 1034  Preparation H applied by herself at [id number]  Attended community meeting and Spirituality groups  Continue to monitor  Precautions maintained

## 2021-06-20 NOTE — PROGRESS NOTES
06/19/21 1300   Activity/Group Checklist   Group Other (Comment)  (OPEN STUDIO Art Therapy/Social, Free Expression)   Attendance Attended   Attendance Duration (min) 46-60   Interactions Interacted appropriately   Affect/Mood Appropriate   Goals Achieved Able to listen to others; Able to engage in interactions

## 2021-06-20 NOTE — NURSING NOTE
Patient was asleep at the beginning of the shift  Breathing pattern even and unlabored  No distress noted  Safety checks ongoing

## 2021-06-20 NOTE — NURSING NOTE
Patient c/o #9 bilateral foot pain  She receiived Volteran Gel and Tylenol 975 mg at 1555 which reduced her pain to #3 within the hour  She is calm and pleasant; visible and minimally social with select peers: generally keeps to herself  She had no verbal outbursts this evening  Rosella Goldmann  She was cooperative and compliant with medication and unit routine

## 2021-06-20 NOTE — PLAN OF CARE
Problem: Alteration in Thoughts and Perception  Goal: Treatment Goal: Gain control of psychotic behaviors/thinking, reduce/eliminate presenting symptoms and demonstrate improved reality functioning upon discharge  Outcome: Progressing  Goal: Verbalize thoughts and feelings  Description: Interventions:  - Promote a nonjudgmental and trusting relationship with the patient through active listening and therapeutic communication  - Assess patient's level of functioning, behavior and potential for risk  - Engage patient in 1 on 1 interactions  - Encourage patient to express fears, feelings, frustrations, and discuss symptoms    - [place] patient to reality, help patient recognize reality-based thinking   - Administer medications as ordered and assess for potential side effects  - Provide the patient education related to the signs and symptoms of the illness and desired effects of prescribed medications  Outcome: Progressing  Goal: Refrain from acting on delusional thinking/internal stimuli  Description: Interventions:  - Monitor patient closely, per order   - Utilize least restrictive measures   - Set reasonable limits, give positive feedback for acceptable   - Administer medications as ordered and monitor of potential side effects  Outcome: Progressing  Goal: Agree to be compliant with medication regime, as prescribed and report medication side effects  Description: Interventions:  - Offer appropriate PRN medication and supervise ingestion; conduct AIMS, as needed   Outcome: Progressing  Goal: Attend and participate in unit activities, including therapeutic, recreational, and educational groups  Description: Interventions:  -Encourage Visitation and family involvement in care  Outcome: Progressing  Goal: Recognize dysfunctional thoughts, communicate reality-based thoughts at the time of discharge  Description: Interventions:  - Provide medication and psycho-education to assist patient in compliance and developing insight into his/her illness   Outcome: Progressing  Goal: Complete daily ADLs, including personal hygiene independently, as able  Description: Interventions:  - Observe, teach, and assist patient with ADLS  - Monitor and promote a balance of rest/activity, with adequate nutrition and elimination   Outcome: Progressing     Problem: Ineffective Coping  Goal: Cooperates with admission process  Description: Interventions:   - Complete admission process  Outcome: Progressing  Goal: Identifies ineffective coping skills  Outcome: Progressing  Goal: Identifies healthy coping skills  Outcome: Progressing  Goal: Demonstrates healthy coping skills  Outcome: Progressing  Goal: Participates in unit activities  Description: Interventions:  - Provide therapeutic environment   - Provide required programming   - Redirect inappropriate behaviors   Outcome: Progressing  Goal: Patient/Family participate in treatment and DC plans  Description: Interventions:  - Provide therapeutic environment  Outcome: Progressing  Goal: Patient/Family verbalizes awareness of resources  Outcome: Progressing  Goal: Understands least restrictive measures  Description: Interventions:  - Utilize least restrictive behavior  Outcome: Progressing  Goal: Free from restraint events  Description: - Utilize least restrictive measures   - Provide behavioral interventions   - Redirect inappropriate behaviors   Outcome: Progressing     Problem: Risk for Self Injury/Neglect  Goal: Treatment Goal: Remain safe during length of stay, learn and adopt new coping skills, and be free of self-injurious ideation, impulses and acts at the time of discharge  Outcome: Progressing  Goal: Verbalize thoughts and feelings  Description: Interventions:  - Assess and re-assess patient's lethality and potential for self-injury  - Engage patient in 1:1 interactions, daily, for a minimum of 15 minutes  - Encourage patient to express feelings, fears, frustrations, hopes  - Establish rapport/trust with patient   Outcome: Progressing  Goal: Refrain from harming self  Description: Interventions:  - Monitor patient closely, per order  - Develop a trusting relationship  - Supervise medication ingestion, monitor effects and side effects   Outcome: Progressing  Goal: Attend and participate in unit activities, including therapeutic, recreational, and educational groups  Description: Interventions:  - Provide therapeutic and educational activities daily, encourage attendance and participation, and document same in the medical record  - Obtain collateral information, encourage visitation and family involvement in care   Outcome: Progressing  Goal: Recognize maladaptive responses and adopt new coping mechanisms  Outcome: Progressing  Goal: Complete daily ADLs, including personal hygiene independently, as able  Description: Interventions:  - Observe, teach, and assist patient with ADLS  - Monitor and promote a balance of rest/activity, with adequate nutrition and elimination  Outcome: Progressing     Problem: Depression  Goal: Treatment Goal: Demonstrate behavioral control of depressive symptoms, verbalize feelings of improved mood/affect, and adopt new coping skills prior to discharge  Outcome: Progressing  Goal: Verbalize thoughts and feelings  Description: Interventions:  - Assess and re-assess patient's level of risk   - Engage patient in 1:1 interactions, daily, for a minimum of 15 minutes   - Encourage patient to express feelings, fears, frustrations, hopes   Outcome: Progressing  Goal: Refrain from harming self  Description: Interventions:  - Monitor patient closely, per order   - Supervise medication ingestion, monitor effects and side effects   Outcome: Progressing  Goal: Refrain from isolation  Description: Interventions:  - Develop a trusting relationship   - Encourage socialization   Outcome: Progressing  Goal: Refrain from self-neglect  Outcome: Progressing  Goal: Attend and participate in unit activities, including therapeutic, recreational, and educational groups  Description: Interventions:  - Provide therapeutic and educational activities daily, encourage attendance and participation, and document same in the medical record   Outcome: Progressing  Goal: Complete daily ADLs, including personal hygiene independently, as able  Description: Interventions:  - Observe, teach, and assist patient with ADLS  -  Monitor and promote a balance of rest/activity, with adequate nutrition and elimination   Outcome: Progressing     Problem: Anxiety  Goal: Anxiety is at manageable level  Description: Interventions:  - Assess and monitor patient's anxiety level  - Monitor for signs and symptoms (heart palpitations, chest pain, shortness of breath, headaches, nausea, feeling jumpy, restlessness, irritable, apprehensive)  - Collaborate with interdisciplinary team and initiate plan and interventions as ordered    - [place] patient to unit/surroundings  - Explain treatment plan  - Encourage participation in care  - Encourage verbalization of concerns/fears  - Identify coping mechanisms  - Assist in developing anxiety-reducing skills  - Administer/offer alternative therapies  - Limit or eliminate stimulants  Outcome: Progressing     Problem: Alteration in Orientation  Goal: Treatment Goal: Demonstrate a reduction of confusion and improved orientation to person, place, time and/or situation upon discharge, according to optimum baseline/ability  Outcome: Progressing  Goal: Interact with staff daily  Description: Interventions:  - Assess and re-assess patient's level of orientation  - Engage patient in 1 on 1 interactions, daily, for a minimum of 15 minutes   - Establish rapport/trust with patient   Outcome: Progressing  Goal: Express concerns related to confused thinking related to:  Description: Interventions:  - Encourage patient to express feelings, fears, frustrations, hopes  - Assign consistent caregivers   - [place]/re-orient patient as needed  - Allow comfort items, as appropriate  - Provide visual cues, signs, etc    Outcome: Progressing  Goal: Allow medical examinations, as recommended  Description: Interventions:  - Provide physical/neurological exams and/or referrals, per provider   Outcome: Progressing  Goal: Cooperate with recommended testing/procedures  Description: Interventions:  - Determine need for ancillary testing  - Observe for mental status changes  - Implement falls/precaution protocol   Outcome: Progressing  Goal: Attend and participate in unit activities, including therapeutic, recreational, and educational groups  Description: Interventions:  - Provide therapeutic and educational activities daily, encourage attendance and participation, and document same in the medical record   - Provide appropriate opportunities for reminiscence   - Provide a consistent daily routine   - Encourage family contact/visitation   Outcome: Progressing  Goal: Complete daily ADLs, including personal hygiene independently, as able  Description: Interventions:  - Observe, teach, and assist patient with ADLS  Outcome: Progressing     Problem: Individualized Interventions  Goal: Patient will verbalize appropriate use of telephone within 5 days  Description: Interventions:  - Treatment team to determine use of supervised phone privileges   Outcome: Progressing  Goal: Patient will verbalize need for hospitalization and will no longer attempt elopement within 5 days  Description: Interventions:  - Ongoing education to help patient understand need for hospitalization  Outcome: Progressing  Goal: Patient will recognize inappropriate behaviors and develop alternative behaviors within 5 days  Description: Interventions:  - Patient in collaboration with Treatment Team will develop a behavior management plan to help identify effective coping skills to deal with stressors  Outcome: Progressing

## 2021-06-21 PROCEDURE — 99232 SBSQ HOSP IP/OBS MODERATE 35: CPT | Performed by: PSYCHIATRY & NEUROLOGY

## 2021-06-21 RX ADMIN — LORAZEPAM 0.5 MG: 0.5 TABLET ORAL at 11:57

## 2021-06-21 RX ADMIN — HALOPERIDOL 1 MG: 1 TABLET ORAL at 21:17

## 2021-06-21 RX ADMIN — PSYLLIUM HUSK 1 PACKET: 3.4 POWDER ORAL at 09:20

## 2021-06-21 RX ADMIN — DICLOFENAC SODIUM 2 G: 10 GEL TOPICAL at 19:24

## 2021-06-21 RX ADMIN — GLYCERIN, PETROLATUM, PHENYLEPHRINE HCL, PRAMOXINE HCL 1 APPLICATION: 144; 2.5; 10; 15 CREAM TOPICAL at 19:24

## 2021-06-21 RX ADMIN — HALOPERIDOL 1 MG: 1 TABLET ORAL at 11:57

## 2021-06-21 RX ADMIN — PHENYTOIN 1 MG: 125 SUSPENSION ORAL at 21:17

## 2021-06-21 RX ADMIN — LORAZEPAM 0.5 MG: 0.5 TABLET ORAL at 21:17

## 2021-06-21 RX ADMIN — LORAZEPAM 0.5 MG: 0.5 TABLET ORAL at 17:01

## 2021-06-21 RX ADMIN — HALOPERIDOL 1 MG: 1 TABLET ORAL at 09:00

## 2021-06-21 RX ADMIN — OLANZAPINE 10 MG: 10 TABLET, FILM COATED ORAL at 21:17

## 2021-06-21 RX ADMIN — GABAPENTIN 600 MG: 300 CAPSULE ORAL at 11:57

## 2021-06-21 RX ADMIN — OLANZAPINE 15 MG: 5 TABLET, FILM COATED ORAL at 09:20

## 2021-06-21 RX ADMIN — GABAPENTIN 600 MG: 300 CAPSULE ORAL at 09:21

## 2021-06-21 RX ADMIN — LORAZEPAM 0.5 MG: 0.5 TABLET ORAL at 09:20

## 2021-06-21 RX ADMIN — NICOTINE 1 PATCH: 21 PATCH, EXTENDED RELEASE TRANSDERMAL at 09:30

## 2021-06-21 RX ADMIN — GABAPENTIN 600 MG: 300 CAPSULE ORAL at 21:17

## 2021-06-21 RX ADMIN — PANTOPRAZOLE SODIUM 40 MG: 40 TABLET, DELAYED RELEASE ORAL at 06:01

## 2021-06-21 RX ADMIN — BENZTROPINE MESYLATE 1 MG: 1 TABLET ORAL at 17:01

## 2021-06-21 RX ADMIN — LIDOCAINE 1 PATCH: 50 PATCH CUTANEOUS at 09:20

## 2021-06-21 RX ADMIN — HALOPERIDOL 1 MG: 1 TABLET ORAL at 17:01

## 2021-06-21 RX ADMIN — GABAPENTIN 600 MG: 300 CAPSULE ORAL at 17:01

## 2021-06-21 RX ADMIN — DIPHENHYDRAMINE HCL 25 MG: 25 TABLET ORAL at 21:18

## 2021-06-21 RX ADMIN — BENZTROPINE MESYLATE 1 MG: 1 TABLET ORAL at 09:21

## 2021-06-21 RX ADMIN — SERTRALINE HYDROCHLORIDE 100 MG: 100 TABLET ORAL at 09:21

## 2021-06-21 RX ADMIN — FENOFIBRATE 145 MG: 145 TABLET ORAL at 09:21

## 2021-06-21 NOTE — NURSING NOTE
Danny Menjivar has been visible most of shift and went out of the deck for fresh air  No outbursts noted this shift  Ate 100% of dinner, med compliant

## 2021-06-21 NOTE — PLAN OF CARE
Problem: Alteration in Thoughts and Perception  Goal: Verbalize thoughts and feelings  Description: Interventions:  - Promote a nonjudgmental and trusting relationship with the patient through active listening and therapeutic communication  - Assess patient's level of functioning, behavior and potential for risk  - Engage patient in 1 on 1 interactions  - Encourage patient to express fears, feelings, frustrations, and discuss symptoms    - [place] patient to reality, help patient recognize reality-based thinking   - Administer medications as ordered and assess for potential side effects  - Provide the patient education related to the signs and symptoms of the illness and desired effects of prescribed medications  Outcome: Progressing     Problem: Alteration in Thoughts and Perception  Goal: Attend and participate in unit activities, including therapeutic, recreational, and educational groups  Description: Interventions:  -Encourage Visitation and family involvement in care  Outcome: Progressing    Patient completed Goal card for the week of 6/21/21    Goals: Be positive              Participate and learn            Be friendlier

## 2021-06-21 NOTE — PLAN OF CARE
Problem: Alteration in Thoughts and Perception  Goal: Refrain from acting on delusional thinking/internal stimuli  Description: Interventions:  - Monitor patient closely, per order   - Utilize least restrictive measures   - Set reasonable limits, give positive feedback for acceptable   - Administer medications as ordered and monitor of potential side effects  Outcome: Progressing  Goal: Agree to be compliant with medication regime, as prescribed and report medication side effects  Description: Interventions:  - Offer appropriate PRN medication and supervise ingestion; conduct AIMS, as needed   Outcome: Progressing  Goal: Attend and participate in unit activities, including therapeutic, recreational, and educational groups  Description: Interventions:  -Encourage Visitation and family involvement in care  Outcome: Progressing     Problem: Ineffective Coping  Goal: Participates in unit activities  Description: Interventions:  - Provide therapeutic environment   - Provide required programming   - Redirect inappropriate behaviors   Outcome: Progressing     Problem: Risk for Self Injury/Neglect  Goal: Refrain from harming self  Description: Interventions:  - Monitor patient closely, per order  - Develop a trusting relationship  - Supervise medication ingestion, monitor effects and side effects   Outcome: Progressing     Problem: Depression  Goal: Refrain from isolation  Description: Interventions:  - Develop a trusting relationship   - Encourage socialization   Outcome: Progressing  Goal: Refrain from self-neglect  Outcome: Progressing

## 2021-06-21 NOTE — NURSING NOTE
Patient is compliant with medication and meals   Patient is going to groups and is social with peers  There was no  Yelling or delusions  She has been attending groups so far today   Will continue to observe her and chart her progress

## 2021-06-21 NOTE — PROGRESS NOTES
Psychiatry Progress Note [facility] 49 y o  female MRN: [id number]  Unit/Bed#: [facility] 791-62 Encounter: [id number]  Code Status: Level 1 - Full Code    PCP: Leidy Singh DO    Date of Admission:  4/7/2021 1435   Date of Service:  06/21/21    Patient Active Problem List   Diagnosis    Schizoaffective disorder, bipolar type ([facility] )    Uncomplicated alcohol dependence ([facility] )    Suicidal behavior    LYNN (generalized anxiety disorder)    Slow transit constipation    Deficiency of vitamin B    Degenerative disc disease, lumbar    Post-traumatic stress disorder, chronic    Lower extremity weakness    Generalized abdominal pain    Non-intractable vomiting    Medical clearance for psychiatric admission    Carbuncle    Alcohol dependence with unspecified alcohol-induced disorder ([facility] )    Mild intermittent asthma without complication    Tobacco abuse    Ear problem, bilateral    Gastroesophageal reflux disease    Right foot pain    Ear problem, right     Review of systems:         Unremarkable but needed Tylenol and Robaxin for back pain and has used Voltaren  Diagnosis:          Schizoaffective bipolar, alcohol use disorder in early remission in controlled environment  Assessment   Overall Status:        Marked decrease in yelling, somewhat accusatory at times and still delusional about staff doing hypnosis on her and still talks about people talking through the vents from upstairs   Certification Statement: The patient will continue to require additional inpatient hospital stay due to ongoing mood swings paranoia   Acceptance by patient:  accepting    Hopefulness in recovery:  Living in a group home   Understanding of medications: has good understanding   Involved in reintegration process:  Adjusting to the unit   Trusting in relationship with psychiatrist:  trusting   Justification for dual anti-psychotics: due to lack of response to single antipsychotics   Side effects from treatment: none    Medication changes   None  today  Medication Education; Risks and S/E and precautions of all meds given to patient and she did verbalize an understanding   Non-pharmacological treatments   Continue with individual, group, milieu and occupational therapy using recovery principles and psycho-education about accepting illness and the need for treatment  Safety   Safety and communication plan established to target dynamic risk factors discussed above  Discharge Plan     most likely to a group home with the act team again     Interval Progress        Marked decrease in voices with no need for p r n  meds lately and she has not been asking for p r n  see the other than for physical complains of pain and has spasms on her back  Still delusional about people doing hypnosis on her and receiving messages from TV and people talking to her from upstairs through the vents on the ceiling    Has been attending most of the groups and waiting still to hear from the group home   Sleep:                        good   appetite:                    good   compliance with medications :    compliant   Side  Effects:                                note   significant events:                        improving   group attendance:                       Attending  Most of the groups 4/5 groups    Mental Status Exam  Appearance: age appropriate, casually dressed, looks older than stated age, underweight      Found in the hallway   Behavior: normal, pleasant, cooperative, appears anxious, mildly anxious,  more friendly today and not confrontational     Speech: fluent, clear, coherent, increased rate, pressured, hypertalkative,   Circumstantial pressured    Mood: depressed, anxious, euphoric  Affect: labile, reactive, slightly brighter  Animated today    Thought Process: normal abstract reasoning, less prominent, tends to be pressured and circumstantial and preoccupied perseverating on discharge    Thought Content: some paranoia, ideas of reference, but does appear as if paranoid  No current suicidal homicidal thoughts intent or plans verbalized  No phobias obsessions compulsions or distorted body perceptions elicited  Also believes TV is sending her messages from movie stars checking her if she is good or bad and threatening to get the feds to go after her  Perceptual Disturbances: no auditory hallucinations, no visual hallucinations, denies auditory hallucinations when asked, does not appear responding to internal stimuli, no voices reported today      Still hears voices off an don, not commanding   Hx Risk Factors: chronic mood disorder, chronic psychotic symptoms, alcohol use, limted insight  Sensorium:       Oriented to 3 spheres and to situation   Cognition: recent and remote memory grossly intact  Consciousness: alert and awake  Attention: attention span and concentration are age appropriate  Intellect: appears to be of average intelligence  Insight: improving  Judgement: improving  Motor Activity: no abnormal movements     Vitals  Temp:  [97 5 °F (36 4 °C)-97 9 °F (36 6 °C)] 97 9 °F (36 6 °C)  HR:  [84-94] 84  Resp:  [18-19] 18  BP: (108-132)/(58-81) 108/58  No intake or output data in the 24 hours ending 06/21/21 0818    Lab Results:  [facility] Admission Reviewed     Current Facility-Administered Medications   Medication Dose Route Frequency Provider Last Rate    acetaminophen  650 mg Oral Q6H PRN Tootie Ames MD      acetaminophen  650 mg Oral Q4H PRN Tootie Ames MD      acetaminophen  975 mg Oral Q6H PRN Tootie Ames MD      al mag oxide-diphenhydramine-lidocaine viscous  10 mL Swish & Swallow Q4H PRN Tootie Ames MD      albuterol  2 puff Inhalation Q6H PRN Tootie Ames MD      aluminum-magnesium hydroxide-simethicone  15 mL Oral Q4H PRN Tootie Ames MD      benztropine  1 mg Intramuscular Q4H PRN Max 6/day Tootie Ames MD      benztropine  1 mg Oral Q4H PRN Max 6/day Geoffry Brochure, MD      benztropine  1 mg Oral BID Geoffry Brochure, MD      calcium carbonate  500 mg Oral TID PRN Geoffry Brochure, MD      Diclofenac Sodium  2 g Topical 4x Daily PRN Geoffry Brochure, MD      diphenhydrAMINE  25 mg Oral HS Geoffry Brochure, MD      fenofibrate  145 mg Oral Daily Geoffry Brochure, MD      gabapentin  600 mg Oral 4x Daily Geoffry Brochure, MD      haloperidol  1 mg Oral 4x Daily Geoffry Brochure, MD      haloperidol  5 mg Oral Q6H PRN Geoffry Brochure, MD      hydrOXYzine HCL  25 mg Oral Q6H PRN Max 4/day Geoffry Brochure, MD      hydrOXYzine HCL  50 mg Oral Q6H PRN Max 4/day Geoffry Brochure, MD      lidocaine  1 patch Topical Daily Geoffry Brochure, MD      LORazepam  1 mg Intramuscular Q6H PRN Geoffry Brochure, MD      LORazepam  0 5 mg Oral 4x Daily Geoffry Brochure, MD      magnesium hydroxide  30 mL Oral Daily PRN Geoffry Brochure, MD      methocarbamol  500 mg Oral Q8H PRN Geoffry Brochure, MD      nicotine  1 patch Transdermal Daily Geoffry Brochure, MD      nicotine polacrilex  4 mg Oral Q2H PRN Geoffry Brochure, MD      OLANZapine  2 5 mg Oral Q8H PRN Geoffry Brochure, MD      OLANZapine  5 mg Oral Q3H PRN Geoffry Brochure, MD      OLANZapine  7 5 mg Oral Q3H PRN Geoffry Brochure, MD      OLANZapine  10 mg Intramuscular Q3H PRN Geoffry Brochure, MD      OLANZapine  5 mg Intramuscular Q3H PRN Geoffry Brochure, MD      OLANZapine  10 mg Oral HS Geoffry Brochure, MD      OLANZapine  15 mg Oral Daily Geoffry Brochure, MD      paliperidone palmitate ER  234 mg Intramuscular Q30 Days Geoffry Brochure, MD      pantoprazole  40 mg Oral Early Morning Geoffry Brochure, MD      Pramox-PE-Glycerin-Petrolatum  1 application Rectal 4x Daily PRN Geoffry Brochure, MD      prazosin  1 mg Oral HS Geoffry Brochure, MD      psyllium  1 packet Oral Daily Geoffry Brochure, MD      sertraline  100 mg Oral Daily Ranjeet Castrejon MD         Counseling / Coordination of Care: Total floor / unit time spent today 15 minutes   Greater than 50% of total time was spent with the patient and / or family counseling and / or somewhat receptive to supportive listening and teaching positive coping skills to deal with symptom mangement  Patient's Rights, confidentiality and exceptions to confidentiality, use of automated medical record, [facility] staff access to medical record, and consent to treatment reviewed  This note was  not shared with the patient because it might further aggravate her psychiatric condition  This note has been dictated

## 2021-06-21 NOTE — PLAN OF CARE
Problem: Alteration in Thoughts and Perception  Goal: Treatment Goal: Gain control of psychotic behaviors/thinking, reduce/eliminate presenting symptoms and demonstrate improved reality functioning upon discharge  Outcome: Progressing  Goal: Verbalize thoughts and feelings  Description: Interventions:  - Promote a nonjudgmental and trusting relationship with the patient through active listening and therapeutic communication  - Assess patient's level of functioning, behavior and potential for risk  - Engage patient in 1 on 1 interactions  - Encourage patient to express fears, feelings, frustrations, and discuss symptoms    - [place] patient to reality, help patient recognize reality-based thinking   - Administer medications as ordered and assess for potential side effects  - Provide the patient education related to the signs and symptoms of the illness and desired effects of prescribed medications  Outcome: Progressing  Goal: Refrain from acting on delusional thinking/internal stimuli  Description: Interventions:  - Monitor patient closely, per order   - Utilize least restrictive measures   - Set reasonable limits, give positive feedback for acceptable   - Administer medications as ordered and monitor of potential side effects  Outcome: Progressing  Goal: Agree to be compliant with medication regime, as prescribed and report medication side effects  Description: Interventions:  - Offer appropriate PRN medication and supervise ingestion; conduct AIMS, as needed   Outcome: Progressing  Goal: Attend and participate in unit activities, including therapeutic, recreational, and educational groups  Description: Interventions:  -Encourage Visitation and family involvement in care  Outcome: Progressing  Goal: Recognize dysfunctional thoughts, communicate reality-based thoughts at the time of discharge  Description: Interventions:  - Provide medication and psycho-education to assist patient in compliance and developing insight into his/her illness   Outcome: Progressing  Goal: Complete daily ADLs, including personal hygiene independently, as able  Description: Interventions:  - Observe, teach, and assist patient with ADLS  - Monitor and promote a balance of rest/activity, with adequate nutrition and elimination   Outcome: Progressing     Problem: Ineffective Coping  Goal: Cooperates with admission process  Description: Interventions:   - Complete admission process  Outcome: Progressing  Goal: Identifies ineffective coping skills  Outcome: Progressing  Goal: Identifies healthy coping skills  Outcome: Progressing  Goal: Demonstrates healthy coping skills  Outcome: Progressing  Goal: Participates in unit activities  Description: Interventions:  - Provide therapeutic environment   - Provide required programming   - Redirect inappropriate behaviors   Outcome: Progressing  Goal: Patient/Family participate in treatment and DC plans  Description: Interventions:  - Provide therapeutic environment  Outcome: Progressing  Goal: Patient/Family verbalizes awareness of resources  Outcome: Progressing  Goal: Understands least restrictive measures  Description: Interventions:  - Utilize least restrictive behavior  Outcome: Progressing  Goal: Free from restraint events  Description: - Utilize least restrictive measures   - Provide behavioral interventions   - Redirect inappropriate behaviors   Outcome: Progressing     Problem: Risk for Self Injury/Neglect  Goal: Treatment Goal: Remain safe during length of stay, learn and adopt new coping skills, and be free of self-injurious ideation, impulses and acts at the time of discharge  Outcome: Progressing  Goal: Verbalize thoughts and feelings  Description: Interventions:  - Assess and re-assess patient's lethality and potential for self-injury  - Engage patient in 1:1 interactions, daily, for a minimum of 15 minutes  - Encourage patient to express feelings, fears, frustrations, hopes  - Establish rapport/trust with patient   Outcome: Progressing  Goal: Refrain from harming self  Description: Interventions:  - Monitor patient closely, per order  - Develop a trusting relationship  - Supervise medication ingestion, monitor effects and side effects   Outcome: Progressing  Goal: Attend and participate in unit activities, including therapeutic, recreational, and educational groups  Description: Interventions:  - Provide therapeutic and educational activities daily, encourage attendance and participation, and document same in the medical record  - Obtain collateral information, encourage visitation and family involvement in care   Outcome: Progressing  Goal: Recognize maladaptive responses and adopt new coping mechanisms  Outcome: Progressing  Goal: Complete daily ADLs, including personal hygiene independently, as able  Description: Interventions:  - Observe, teach, and assist patient with ADLS  - Monitor and promote a balance of rest/activity, with adequate nutrition and elimination  Outcome: Progressing     Problem: Depression  Goal: Treatment Goal: Demonstrate behavioral control of depressive symptoms, verbalize feelings of improved mood/affect, and adopt new coping skills prior to discharge  Outcome: Progressing  Goal: Verbalize thoughts and feelings  Description: Interventions:  - Assess and re-assess patient's level of risk   - Engage patient in 1:1 interactions, daily, for a minimum of 15 minutes   - Encourage patient to express feelings, fears, frustrations, hopes   Outcome: Progressing  Goal: Refrain from harming self  Description: Interventions:  - Monitor patient closely, per order   - Supervise medication ingestion, monitor effects and side effects   Outcome: Progressing  Goal: Refrain from isolation  Description: Interventions:  - Develop a trusting relationship   - Encourage socialization   Outcome: Progressing  Goal: Refrain from self-neglect  Outcome: Progressing  Goal: Attend and participate in unit activities, including therapeutic, recreational, and educational groups  Description: Interventions:  - Provide therapeutic and educational activities daily, encourage attendance and participation, and document same in the medical record   Outcome: Progressing  Goal: Complete daily ADLs, including personal hygiene independently, as able  Description: Interventions:  - Observe, teach, and assist patient with ADLS  -  Monitor and promote a balance of rest/activity, with adequate nutrition and elimination   Outcome: Progressing     Problem: Anxiety  Goal: Anxiety is at manageable level  Description: Interventions:  - Assess and monitor patient's anxiety level  - Monitor for signs and symptoms (heart palpitations, chest pain, shortness of breath, headaches, nausea, feeling jumpy, restlessness, irritable, apprehensive)  - Collaborate with interdisciplinary team and initiate plan and interventions as ordered    - [place] patient to unit/surroundings  - Explain treatment plan  - Encourage participation in care  - Encourage verbalization of concerns/fears  - Identify coping mechanisms  - Assist in developing anxiety-reducing skills  - Administer/offer alternative therapies  - Limit or eliminate stimulants  Outcome: Progressing     Problem: Risk for Violence/Aggression Toward Others  Goal: Treatment Goal: Refrain from acts of violence/aggression during length of stay, and demonstrate improved impulse control at the time of discharge  Outcome: Progressing  Goal: Verbalize thoughts and feelings  Description: Interventions:  - Assess and re-assess patient's level of risk, every waking shift  - Engage patient in 1:1 interactions, daily, for a minimum of 15 minutes   - Allow patient to express feelings and frustrations in a safe and non-threatening manner   - Establish rapport/trust with patient   Outcome: Progressing  Goal: Refrain from harming others  Outcome: Progressing  Goal: Refrain from destructive acts on the environment or property  Outcome: Progressing  Goal: Control angry outbursts  Description: Interventions:  - Monitor patient closely, per order  - Ensure early verbal de-escalation  - Monitor prn medication needs  - Set reasonable/therapeutic limits, outline behavioral expectations, and consequences   - Provide a non-threatening milieu, utilizing the least restrictive interventions   Outcome: Progressing  Goal: Attend and participate in unit activities, including therapeutic, recreational, and educational groups  Description: Interventions:  - Provide therapeutic and educational activities daily, encourage attendance and participation, and document same in the medical record   Outcome: Progressing  Goal: Identify appropriate positive anger management techniques  Description: Interventions:  - Offer anger management and coping skills groups   - Staff will provide positive feedback for appropriate anger control  Outcome: Progressing     Problem: Alteration in Orientation  Goal: Treatment Goal: Demonstrate a reduction of confusion and improved orientation to person, place, time and/or situation upon discharge, according to optimum baseline/ability  Outcome: Progressing  Goal: Interact with staff daily  Description: Interventions:  - Assess and re-assess patient's level of orientation  - Engage patient in 1 on 1 interactions, daily, for a minimum of 15 minutes   - Establish rapport/trust with patient   Outcome: Progressing  Goal: Express concerns related to confused thinking related to:  Description: Interventions:  - Encourage patient to express feelings, fears, frustrations, hopes  - Assign consistent caregivers   - [place]/re-orient patient as needed  - Allow comfort items, as appropriate  - Provide visual cues, signs, etc    Outcome: Progressing  Goal: Allow medical examinations, as recommended  Description: Interventions:  - Provide physical/neurological exams and/or referrals, per provider   Outcome: Progressing  Goal: Cooperate with recommended testing/procedures  Description: Interventions:  - Determine need for ancillary testing  - Observe for mental status changes  - Implement falls/precaution protocol   Outcome: Progressing  Goal: Attend and participate in unit activities, including therapeutic, recreational, and educational groups  Description: Interventions:  - Provide therapeutic and educational activities daily, encourage attendance and participation, and document same in the medical record   - Provide appropriate opportunities for reminiscence   - Provide a consistent daily routine   - Encourage family contact/visitation   Outcome: Progressing  Goal: Complete daily ADLs, including personal hygiene independently, as able  Description: Interventions:  - Observe, teach, and assist patient with ADLS  Outcome: Progressing     Problem: Individualized Interventions  Goal: Patient will verbalize appropriate use of telephone within 5 days  Description: Interventions:  - Treatment team to determine use of supervised phone privileges   Outcome: Progressing  Goal: Patient will verbalize need for hospitalization and will no longer attempt elopement within 5 days  Description: Interventions:  - Ongoing education to help patient understand need for hospitalization  Outcome: Progressing  Goal: Patient will recognize inappropriate behaviors and develop alternative behaviors within 5 days  Description: Interventions:  - Patient in collaboration with Treatment Team will develop a behavior management plan to help identify effective coping skills to deal with stressors  Outcome: Progressing

## 2021-06-21 NOTE — PROGRESS NOTES
06/21/21 1041   Team Meeting   Meeting Type Daily Rounds   Initial Conference Date 06/21/21   Patient/Family Present   Patient Present No   Patient's Family Present No       Daily Rounds Documentation  Team Members Participating  MD Renata Cortez, [place] [id number], [id number] [facility]  Roselyn Villegas RN     Case reviewed  Had a good weekend, a few minor episodes of yelling out though brief and controlled  4/5 groups on Friday

## 2021-06-22 PROCEDURE — 99232 SBSQ HOSP IP/OBS MODERATE 35: CPT | Performed by: PSYCHIATRY & NEUROLOGY

## 2021-06-22 RX ADMIN — SERTRALINE HYDROCHLORIDE 100 MG: 100 TABLET ORAL at 08:43

## 2021-06-22 RX ADMIN — PSYLLIUM HUSK 1 PACKET: 3.4 POWDER ORAL at 08:42

## 2021-06-22 RX ADMIN — GABAPENTIN 600 MG: 300 CAPSULE ORAL at 17:24

## 2021-06-22 RX ADMIN — DIPHENHYDRAMINE HCL 25 MG: 25 TABLET ORAL at 21:17

## 2021-06-22 RX ADMIN — HALOPERIDOL 1 MG: 1 TABLET ORAL at 08:43

## 2021-06-22 RX ADMIN — BENZTROPINE MESYLATE 1 MG: 1 TABLET ORAL at 17:24

## 2021-06-22 RX ADMIN — LORAZEPAM 0.5 MG: 0.5 TABLET ORAL at 08:42

## 2021-06-22 RX ADMIN — PHENYTOIN 1 MG: 125 SUSPENSION ORAL at 21:17

## 2021-06-22 RX ADMIN — METHOCARBAMOL TABLETS 500 MG: 500 TABLET, COATED ORAL at 15:52

## 2021-06-22 RX ADMIN — LORAZEPAM 0.5 MG: 0.5 TABLET ORAL at 17:24

## 2021-06-22 RX ADMIN — BENZTROPINE MESYLATE 1 MG: 1 TABLET ORAL at 08:43

## 2021-06-22 RX ADMIN — OLANZAPINE 10 MG: 10 TABLET, FILM COATED ORAL at 21:17

## 2021-06-22 RX ADMIN — GABAPENTIN 600 MG: 300 CAPSULE ORAL at 21:17

## 2021-06-22 RX ADMIN — PANTOPRAZOLE SODIUM 40 MG: 40 TABLET, DELAYED RELEASE ORAL at 06:02

## 2021-06-22 RX ADMIN — GABAPENTIN 600 MG: 300 CAPSULE ORAL at 12:17

## 2021-06-22 RX ADMIN — GABAPENTIN 600 MG: 300 CAPSULE ORAL at 08:43

## 2021-06-22 RX ADMIN — OLANZAPINE 15 MG: 5 TABLET, FILM COATED ORAL at 08:42

## 2021-06-22 RX ADMIN — LIDOCAINE 1 PATCH: 50 PATCH CUTANEOUS at 08:45

## 2021-06-22 RX ADMIN — LORAZEPAM 0.5 MG: 0.5 TABLET ORAL at 12:17

## 2021-06-22 RX ADMIN — LORAZEPAM 0.5 MG: 0.5 TABLET ORAL at 21:17

## 2021-06-22 RX ADMIN — NICOTINE 1 PATCH: 21 PATCH, EXTENDED RELEASE TRANSDERMAL at 08:47

## 2021-06-22 RX ADMIN — HALOPERIDOL 1 MG: 1 TABLET ORAL at 21:17

## 2021-06-22 RX ADMIN — HALOPERIDOL 1 MG: 1 TABLET ORAL at 12:17

## 2021-06-22 RX ADMIN — HALOPERIDOL 1 MG: 1 TABLET ORAL at 17:24

## 2021-06-22 RX ADMIN — FENOFIBRATE 145 MG: 145 TABLET ORAL at 08:43

## 2021-06-22 NOTE — PROGRESS NOTES
06/22/21 1108   Team Meeting   Meeting Type Daily Rounds   Initial Conference Date 06/22/21   Patient/Family Present   Patient Present No   Patient's Family Present No       Daily Rounds Documentation  Team Members Participating  MD Jody Elizabeth, RN  Shawn Odom, DECLANW  Benedetto Bloch, MARK ANTHONY Lr, CPS    Case reviewed  5/5 groups  No recent outbursts  Appears to be doing well overall, hopeful for a discharge soon to group home  SW awaiting response from [facility] / as to if pt can be interviewed or at least start process of connecting with prospective group homes   SW to do a new ACT referral

## 2021-06-22 NOTE — NURSING NOTE
Patient c/o muscular pain in her legs and requested "muscle relaxer" She received Robaxin as prn at 1552  Will monitor effect   She did state that her foot pain was "better today"  She had NO episodes of yelling or behavioral issues  She was cooperative and pleasant with an irritable edge

## 2021-06-22 NOTE — PROGRESS NOTES
06/22/21 1210   Team Meeting   Meeting Type Tx Team Meeting   Initial Conference Date 06/22/21   Next Conference Date 06/29/21   Team Members Present   Team Members Present Physician;Nurse; Other (Discipline and Name);    Physician Team Member Dr Lucille Maher MD   Nursing Team Member Saleem Gilliam RN   Social Work Team Member Dana Jenkins   Other (Discipline and Name) [facility]   Patient/Family Present   Patient Present Yes   Patient's Family Present No       Summary: Pt presented for her treatment team meeting this morning with a completed self assessment  Pt is calm and controlled, cooperative and not as anxious in her presentation  She has adequately groomed and dressed appropriately  Pt reported her barrier has been to keep stable  Her main coping skills has been self talk  Her goal last week was to make it to groups (as many as possible) and her goal this week is to be stable at least 95% of the time  Pt denied missed medications, listed her medications, and reported medical issue of pain in ankles, as well as her need to see obgyn and have a mammogram, which SW will assist with setting up appointments  Pt knows her nurses and practices self care by having good hygiene, doing laundry, and dressing well  Group attendance over 70% this week

## 2021-06-22 NOTE — PLAN OF CARE
Problem: Alteration in Thoughts and Perception  Goal: Treatment Goal: Gain control of psychotic behaviors/thinking, reduce/eliminate presenting symptoms and demonstrate improved reality functioning upon discharge  Outcome: Progressing  Goal: Verbalize thoughts and feelings  Description: Interventions:  - Promote a nonjudgmental and trusting relationship with the patient through active listening and therapeutic communication  - Assess patient's level of functioning, behavior and potential for risk  - Engage patient in 1 on 1 interactions  - Encourage patient to express fears, feelings, frustrations, and discuss symptoms    - [place] patient to reality, help patient recognize reality-based thinking   - Administer medications as ordered and assess for potential side effects  - Provide the patient education related to the signs and symptoms of the illness and desired effects of prescribed medications  Outcome: Progressing  Goal: Refrain from acting on delusional thinking/internal stimuli  Description: Interventions:  - Monitor patient closely, per order   - Utilize least restrictive measures   - Set reasonable limits, give positive feedback for acceptable   - Administer medications as ordered and monitor of potential side effects  Outcome: Not Progressing  Goal: Agree to be compliant with medication regime, as prescribed and report medication side effects  Description: Interventions:  - Offer appropriate PRN medication and supervise ingestion; conduct AIMS, as needed   Outcome: Progressing  Goal: Attend and participate in unit activities, including therapeutic, recreational, and educational groups  Description: Interventions:  -Encourage Visitation and family involvement in care  Outcome: Progressing  Goal: Recognize dysfunctional thoughts, communicate reality-based thoughts at the time of discharge  Description: Interventions:  - Provide medication and psycho-education to assist patient in compliance and developing insight into his/her illness   Outcome: Progressing  Goal: Complete daily ADLs, including personal hygiene independently, as able  Description: Interventions:  - Observe, teach, and assist patient with ADLS  - Monitor and promote a balance of rest/activity, with adequate nutrition and elimination   Outcome: Progressing     Problem: Ineffective Coping  Goal: Cooperates with admission process  Description: Interventions:   - Complete admission process  Outcome: Progressing  Goal: Identifies ineffective coping skills  Outcome: Progressing  Goal: Identifies healthy coping skills  Outcome: Progressing  Goal: Demonstrates healthy coping skills  Outcome: Progressing  Goal: Participates in unit activities  Description: Interventions:  - Provide therapeutic environment   - Provide required programming   - Redirect inappropriate behaviors   Outcome: Progressing  Goal: Patient/Family participate in treatment and DC plans  Description: Interventions:  - Provide therapeutic environment  Outcome: Progressing  Goal: Patient/Family verbalizes awareness of resources  Outcome: Progressing  Goal: Understands least restrictive measures  Description: Interventions:  - Utilize least restrictive behavior  Outcome: Progressing  Goal: Free from restraint events  Description: - Utilize least restrictive measures   - Provide behavioral interventions   - Redirect inappropriate behaviors   Outcome: Progressing     Problem: Risk for Self Injury/Neglect  Goal: Treatment Goal: Remain safe during length of stay, learn and adopt new coping skills, and be free of self-injurious ideation, impulses and acts at the time of discharge  Outcome: Progressing  Goal: Verbalize thoughts and feelings  Description: Interventions:  - Assess and re-assess patient's lethality and potential for self-injury  - Engage patient in 1:1 interactions, daily, for a minimum of 15 minutes  - Encourage patient to express feelings, fears, frustrations, hopes  - Establish rapport/trust with patient   Outcome: Progressing  Goal: Refrain from harming self  Description: Interventions:  - Monitor patient closely, per order  - Develop a trusting relationship  - Supervise medication ingestion, monitor effects and side effects   Outcome: Progressing  Goal: Attend and participate in unit activities, including therapeutic, recreational, and educational groups  Description: Interventions:  - Provide therapeutic and educational activities daily, encourage attendance and participation, and document same in the medical record  - Obtain collateral information, encourage visitation and family involvement in care   Outcome: Progressing  Goal: Recognize maladaptive responses and adopt new coping mechanisms  Outcome: Progressing  Goal: Complete daily ADLs, including personal hygiene independently, as able  Description: Interventions:  - Observe, teach, and assist patient with ADLS  - Monitor and promote a balance of rest/activity, with adequate nutrition and elimination  Outcome: Progressing     Problem: Depression  Goal: Treatment Goal: Demonstrate behavioral control of depressive symptoms, verbalize feelings of improved mood/affect, and adopt new coping skills prior to discharge  Outcome: Progressing  Goal: Verbalize thoughts and feelings  Description: Interventions:  - Assess and re-assess patient's level of risk   - Engage patient in 1:1 interactions, daily, for a minimum of 15 minutes   - Encourage patient to express feelings, fears, frustrations, hopes   Outcome: Progressing  Goal: Refrain from harming self  Description: Interventions:  - Monitor patient closely, per order   - Supervise medication ingestion, monitor effects and side effects   Outcome: Progressing  Goal: Refrain from isolation  Description: Interventions:  - Develop a trusting relationship   - Encourage socialization   Outcome: Progressing  Goal: Refrain from self-neglect  Outcome: Progressing  Goal: Attend and participate in unit activities, including therapeutic, recreational, and educational groups  Description: Interventions:  - Provide therapeutic and educational activities daily, encourage attendance and participation, and document same in the medical record   Outcome: Progressing  Goal: Complete daily ADLs, including personal hygiene independently, as able  Description: Interventions:  - Observe, teach, and assist patient with ADLS  -  Monitor and promote a balance of rest/activity, with adequate nutrition and elimination   Outcome: Progressing     Problem: Anxiety  Goal: Anxiety is at manageable level  Description: Interventions:  - Assess and monitor patient's anxiety level  - Monitor for signs and symptoms (heart palpitations, chest pain, shortness of breath, headaches, nausea, feeling jumpy, restlessness, irritable, apprehensive)  - Collaborate with interdisciplinary team and initiate plan and interventions as ordered    - [place] patient to unit/surroundings  - Explain treatment plan  - Encourage participation in care  - Encourage verbalization of concerns/fears  - Identify coping mechanisms  - Assist in developing anxiety-reducing skills  - Administer/offer alternative therapies  - Limit or eliminate stimulants  Outcome: Progressing     Problem: Risk for Violence/Aggression Toward Others  Goal: Treatment Goal: Refrain from acts of violence/aggression during length of stay, and demonstrate improved impulse control at the time of discharge  Outcome: Progressing  Goal: Verbalize thoughts and feelings  Description: Interventions:  - Assess and re-assess patient's level of risk, every waking shift  - Engage patient in 1:1 interactions, daily, for a minimum of 15 minutes   - Allow patient to express feelings and frustrations in a safe and non-threatening manner   - Establish rapport/trust with patient   Outcome: Progressing  Goal: Refrain from harming others  Outcome: Progressing  Goal: Refrain from destructive acts on the environment or property  Outcome: Progressing  Goal: Control angry outbursts  Description: Interventions:  - Monitor patient closely, per order  - Ensure early verbal de-escalation  - Monitor prn medication needs  - Set reasonable/therapeutic limits, outline behavioral expectations, and consequences   - Provide a non-threatening milieu, utilizing the least restrictive interventions   Outcome: Progressing  Goal: Attend and participate in unit activities, including therapeutic, recreational, and educational groups  Description: Interventions:  - Provide therapeutic and educational activities daily, encourage attendance and participation, and document same in the medical record   Outcome: Progressing  Goal: Identify appropriate positive anger management techniques  Description: Interventions:  - Offer anger management and coping skills groups   - Staff will provide positive feedback for appropriate anger control  Outcome: Progressing     Problem: Alteration in Orientation  Goal: Treatment Goal: Demonstrate a reduction of confusion and improved orientation to person, place, time and/or situation upon discharge, according to optimum baseline/ability  Outcome: Progressing  Goal: Interact with staff daily  Description: Interventions:  - Assess and re-assess patient's level of orientation  - Engage patient in 1 on 1 interactions, daily, for a minimum of 15 minutes   - Establish rapport/trust with patient   Outcome: Progressing  Goal: Express concerns related to confused thinking related to:  Description: Interventions:  - Encourage patient to express feelings, fears, frustrations, hopes  - Assign consistent caregivers   - [place]/re-orient patient as needed  - Allow comfort items, as appropriate  - Provide visual cues, signs, etc    Outcome: Not Progressing  Goal: Allow medical examinations, as recommended  Description: Interventions:  - Provide physical/neurological exams and/or referrals, per provider   Outcome: Progressing  Goal: Cooperate with recommended testing/procedures  Description: Interventions:  - Determine need for ancillary testing  - Observe for mental status changes  - Implement falls/precaution protocol   Outcome: Progressing  Goal: Attend and participate in unit activities, including therapeutic, recreational, and educational groups  Description: Interventions:  - Provide therapeutic and educational activities daily, encourage attendance and participation, and document same in the medical record   - Provide appropriate opportunities for reminiscence   - Provide a consistent daily routine   - Encourage family contact/visitation   Outcome: Progressing  Goal: Complete daily ADLs, including personal hygiene independently, as able  Description: Interventions:  - Observe, teach, and assist patient with ADLS  Outcome: Progressing     Problem: Individualized Interventions  Goal: Patient will verbalize appropriate use of telephone within 5 days  Description: Interventions:  - Treatment team to determine use of supervised phone privileges   Outcome: Progressing  Goal: Patient will verbalize need for hospitalization and will no longer attempt elopement within 5 days  Description: Interventions:  - Ongoing education to help patient understand need for hospitalization  Outcome: Progressing  Goal: Patient will recognize inappropriate behaviors and develop alternative behaviors within 5 days  Description: Interventions:  - Patient in collaboration with Treatment Team will develop a behavior management plan to help identify effective coping skills to deal with stressors  Outcome: Progressing

## 2021-06-22 NOTE — NURSING NOTE
Alert, cooperative and visible intermittently  Pt states this am why is it they are hypnotizing me  Writer able to redirect pt with conversation  No SI or HI noted  Denies depression, anxiety and pain  Attended community meeting, relaxation positive self talk and IMR  Consumed 100% of breakfast and 100% of lunch  Took all medication without prompting  Maintained on safe precautions without incident   Will continue to monitor progress and recovery program

## 2021-06-22 NOTE — PROGRESS NOTES
Psychiatry Progress Note Paulie Hill [age] y o  female MRN: [id number]  Unit/Bed#: [facility] 309-23 Encounter: [id number]  Code Status: Level 1 - Full Code    PCP: Mic Mchugh DO    Date of Admission:  4/7/2021 1435   Date of Service:  06/22/21    Patient Active Problem List   Diagnosis    Schizoaffective disorder, bipolar type ([facility] )    Uncomplicated alcohol dependence ([facility] )    Suicidal behavior    LYNN (generalized anxiety disorder)    Slow transit constipation    Deficiency of vitamin B    Degenerative disc disease, lumbar    Post-traumatic stress disorder, chronic    Lower extremity weakness    Generalized abdominal pain    Non-intractable vomiting    Medical clearance for psychiatric admission    Carbuncle    Alcohol dependence with unspecified alcohol-induced disorder ([facility] )    Mild intermittent asthma without complication    Tobacco abuse    Ear problem, bilateral    Gastroesophageal reflux disease    Right foot pain    Ear problem, right     Review of systems:       unremarkable  Diagnosis:          Schizoaffective bipolar, alcohol use disorder in early remission in controlled environment  Assessment   Overall Status:        no yelling screaming reported yesterday the and has not been openly talking about any delusions and attending groups   Certification Statement: The patient will continue to require additional inpatient hospital stay due to ongoing mood swings paranoia   Acceptance by patient:  accepting    Hopefulness in recovery:  Living in a group home   Understanding of medications: has good understanding   Involved in reintegration process:  Adjusting to the unit   Trusting in relationship with psychiatrist:  trusting   Justification for dual anti-psychotics: due to lack of response to single antipsychotics   Side effects from treatment: none    Medication changes   None  today  Medication Education;  Risks and S/E and precautions of all meds given to patient and she did verbalize an understanding   Non-pharmacological treatments   Continue with individual, group, milieu and occupational therapy using recovery principles and psycho-education about accepting illness and the need for treatment  Safety   Safety and communication plan established to target dynamic risk factors discussed above  Discharge Plan     most likely to a group home with the act team again     Interval Progress       No complains about hearing voices and no yelling or screaming reported in the last few days in a row  However still with same delusions about people doing hypnosis on her and receiving messages from TV and about people living upstairs trying to talk to her through the vents on the ceiling but only talks about them when asked  Has been attending most of the groups and is waiting to hear from the group home and interacting well with staff and peers      Sleep:                        good   appetite:                     good   compliance with medications :     compliance   Side  Effects:                                  None reported   significant events:                        Improving  With no screaming   group attendance:                        Attending most of the groups 5/5 groups    Mental Status Exam  Appearance: age appropriate, casually dressed, looks older than stated age, underweight   Attended team meeting  Behavior: normal, pleasant, cooperative, appears anxious, mildly anxious,  more friendly today and not confrontational     Speech: fluent, clear, coherent, increased rate, pressured, hypertalkative,   Circumstantial pressured    Mood: depressed, anxious, euphoric  Affect: labile, reactive, slightly brighter    Thought Process: normal abstract reasoning, less prominent, tends to be pressured and circumstantial and preoccupied perseverating on discharge    Thought Content: some paranoia, ideas of reference, but does appear as if paranoid    No current suicidal homicidal thoughts intent or plans verbalized  No phobias obsessions compulsions or distorted body perceptions elicited  Also believes TV is sending her messages from movie stars checking her if she is good or bad and threatening to get the feds to go after her  But not so overwhelmed and dwelling on these experiences, still questions if staff is doing hypnosis on her  Perceptual Disturbances: no auditory hallucinations, no visual hallucinations, denies auditory hallucinations when asked, does not appear responding to internal stimuli, no voices reported today     No vices today   Hx Risk Factors: chronic mood disorder, chronic psychotic symptoms, alcohol use, limted insight  Sensorium:       Oriented to 3 spheres and to situation   Cognition: recent and remote memory grossly intact  Consciousness: alert and awake  Attention: attention span and concentration are age appropriate  Intellect: appears to be of average intelligence  Insight: improving  Judgement: improving  Motor Activity: no abnormal movements     Vitals  Temp:  [97 6 °F (36 4 °C)-97 9 °F (36 6 °C)] 97 6 °F (36 4 °C)  HR:  [81-92] 92  Resp:  [16-18] 16  BP: (101-108)/(58-67) 105/67  No intake or output data in the 24 hours ending 06/22/21 0531    Lab Results:  [facility] Admission Reviewed     Current Facility-Administered Medications   Medication Dose Route Frequency Provider Last Rate    acetaminophen  650 mg Oral Q6H PRN Jayne Chapman MD      acetaminophen  650 mg Oral Q4H PRN Jayne Chapman MD      acetaminophen  975 mg Oral Q6H PRN Jayne Chapman MD      al mag oxide-diphenhydramine-lidocaine viscous  10 mL Swish & Swallow Q4H PRN Jayne Chapman MD      albuterol  2 puff Inhalation Q6H PRN Jayne Chapman MD      aluminum-magnesium hydroxide-simethicone  15 mL Oral Q4H PRN Jayne Chapman MD      benztropine  1 mg Intramuscular Q4H PRN Max 6/day Jayne Chapman MD      benztropine  1 mg Oral Q4H PRN Max 6/day Jayne Chapman MD      benztropine  1 mg Oral BID Jean-Paul Kan MD      calcium carbonate  500 mg Oral TID PRN Jean-Paul Kan MD      Diclofenac Sodium  2 g Topical 4x Daily PRN Jean-Paul Kan MD      diphenhydrAMINE  25 mg Oral HS Jean-Paul Kan MD      fenofibrate  145 mg Oral Daily Jean-Paul Kan MD      gabapentin  600 mg Oral 4x Daily Jean-Paul Kan MD      haloperidol  1 mg Oral 4x Daily Jean-Paul Kan MD      haloperidol  5 mg Oral Q6H PRN Jean-Paul Kan MD      hydrOXYzine HCL  25 mg Oral Q6H PRN Max 4/day Jean-Paul Kan MD      hydrOXYzine HCL  50 mg Oral Q6H PRN Max 4/day Jean-Paul Kan MD      lidocaine  1 patch Topical Daily Jean-Paul Kan MD      LORazepam  1 mg Intramuscular Q6H PRN Jean-Paul Kan MD      LORazepam  0 5 mg Oral 4x Daily Jean-Paul Kan MD      magnesium hydroxide  30 mL Oral Daily PRN Jean-Paul Kan MD      methocarbamol  500 mg Oral Q8H PRN Jean-Paul Kan MD      nicotine  1 patch Transdermal Daily Jean-Paul Kan MD      nicotine polacrilex  4 mg Oral Q2H PRN Jean-Paul Kan MD      OLANZapine  2 5 mg Oral Q8H PRN Jean-Paul Kan MD      OLANZapine  5 mg Oral Q3H PRN Jean-Paul Kan MD      OLANZapine  7 5 mg Oral Q3H PRN Jean-Paul Kan MD      OLANZapine  10 mg Intramuscular Q3H PRN Jean-Paul Kan MD      OLANZapine  5 mg Intramuscular Q3H PRN Jean-Paul Kan MD      OLANZapine  10 mg Oral HS Jean-Paul Kan MD      OLANZapine  15 mg Oral Daily Jean-Paul Kan MD      paliperidone palmitate ER  234 mg Intramuscular Q30 Days Jean-Paul Kan MD      pantoprazole  40 mg Oral Early Morning Jean-Paul Kan MD      Pramox-PE-Glycerin-Petrolatum  1 application Rectal 4x Daily PRN Jean-Paul Kan MD      prazosin  1 mg Oral HS Jean-Paul Kan MD      psyllium  1 packet Oral Daily Jean-Paul Kan MD      sertraline  100 mg Oral Daily Jean-Paul Kan MD         Counseling / Coordination of Care: Total floor / unit time spent today 15 minutes   Greater than 50% of total time was spent with the patient and / or family counseling and / or somewhat receptive to supportive listening and teaching positive coping skills to deal with symptom mangement  Patient's Rights, confidentiality and exceptions to confidentiality, use of automated medical record, [facility] At[facility] staff access to medical record, and consent to treatment reviewed  This note was  not shared with the patient because it might further aggravate her psychiatric condition  This note has been dictated

## 2021-06-23 PROCEDURE — 99232 SBSQ HOSP IP/OBS MODERATE 35: CPT | Performed by: PSYCHIATRY & NEUROLOGY

## 2021-06-23 RX ADMIN — LIDOCAINE 1 PATCH: 50 PATCH CUTANEOUS at 09:00

## 2021-06-23 RX ADMIN — DIPHENHYDRAMINE HCL 25 MG: 25 TABLET ORAL at 21:16

## 2021-06-23 RX ADMIN — BENZTROPINE MESYLATE 1 MG: 1 TABLET ORAL at 08:23

## 2021-06-23 RX ADMIN — FENOFIBRATE 145 MG: 145 TABLET ORAL at 08:23

## 2021-06-23 RX ADMIN — LORAZEPAM 0.5 MG: 0.5 TABLET ORAL at 08:23

## 2021-06-23 RX ADMIN — METHOCARBAMOL TABLETS 500 MG: 500 TABLET, COATED ORAL at 16:05

## 2021-06-23 RX ADMIN — GABAPENTIN 600 MG: 300 CAPSULE ORAL at 08:23

## 2021-06-23 RX ADMIN — PANTOPRAZOLE SODIUM 40 MG: 40 TABLET, DELAYED RELEASE ORAL at 06:09

## 2021-06-23 RX ADMIN — OLANZAPINE 15 MG: 5 TABLET, FILM COATED ORAL at 08:23

## 2021-06-23 RX ADMIN — HALOPERIDOL 1 MG: 1 TABLET ORAL at 21:16

## 2021-06-23 RX ADMIN — GABAPENTIN 600 MG: 300 CAPSULE ORAL at 21:16

## 2021-06-23 RX ADMIN — LORAZEPAM 0.5 MG: 0.5 TABLET ORAL at 17:07

## 2021-06-23 RX ADMIN — BENZTROPINE MESYLATE 1 MG: 1 TABLET ORAL at 17:07

## 2021-06-23 RX ADMIN — GABAPENTIN 600 MG: 300 CAPSULE ORAL at 17:06

## 2021-06-23 RX ADMIN — HALOPERIDOL 1 MG: 1 TABLET ORAL at 12:05

## 2021-06-23 RX ADMIN — GABAPENTIN 600 MG: 300 CAPSULE ORAL at 12:05

## 2021-06-23 RX ADMIN — OLANZAPINE 10 MG: 10 TABLET, FILM COATED ORAL at 21:17

## 2021-06-23 RX ADMIN — HALOPERIDOL 1 MG: 1 TABLET ORAL at 17:07

## 2021-06-23 RX ADMIN — SERTRALINE HYDROCHLORIDE 100 MG: 100 TABLET ORAL at 08:23

## 2021-06-23 RX ADMIN — LORAZEPAM 0.5 MG: 0.5 TABLET ORAL at 12:05

## 2021-06-23 RX ADMIN — LORAZEPAM 0.5 MG: 0.5 TABLET ORAL at 21:16

## 2021-06-23 RX ADMIN — PSYLLIUM HUSK 1 PACKET: 3.4 POWDER ORAL at 08:23

## 2021-06-23 RX ADMIN — HALOPERIDOL 1 MG: 1 TABLET ORAL at 08:23

## 2021-06-23 RX ADMIN — NICOTINE 1 PATCH: 21 PATCH, EXTENDED RELEASE TRANSDERMAL at 09:00

## 2021-06-23 NOTE — PROGRESS NOTES
Psychiatry Progress Note [facility] 49 y o  female MRN: [id number]  Unit/Bed#: [facility] 533-55 Encounter: [id number]  Code Status: Level 1 - Full Code    PCP: Slime Valdez DO    Date of Admission:  4/7/2021 1435   Date of Service:  06/23/21    Patient Active Problem List   Diagnosis    Schizoaffective disorder, bipolar type ([facility] )    Uncomplicated alcohol dependence ([facility] )    Suicidal behavior    LYNN (generalized anxiety disorder)    Slow transit constipation    Deficiency of vitamin B    Degenerative disc disease, lumbar    Post-traumatic stress disorder, chronic    Lower extremity weakness    Generalized abdominal pain    Non-intractable vomiting    Medical clearance for psychiatric admission    Carbuncle    Alcohol dependence with unspecified alcohol-induced disorder ([place] [address] )    Mild intermittent asthma without complication    Tobacco abuse    Ear problem, bilateral    Gastroesophageal reflux disease    Right foot pain    Ear problem, right     Review of systems:     Unremarkable today  Diagnosis:           Schizoaffective bipolar, alcohol use disorder in early remission in controlled environment  Assessment   Overall Status:         Progressing well with no further yelling or screaming but still somewhat delusional and attending groups   Certification Statement: The patient will continue to require additional inpatient hospital stay due to ongoing mood swings paranoia   Acceptance by patient:  accepting    Hopefulness in recovery:  Living in a group home   Understanding of medications: has good understanding   Involved in reintegration process:  Adjusting to the unit   Trusting in relationship with psychiatrist:  trusting   Justification for dual anti-psychotics: due to lack of response to single antipsychotics   Side effects from treatment: none    Medication changes   None  today  Medication Education;  Risks and S/E and precautions of all meds given to patient and she did verbalize an understanding   Non-pharmacological treatments   Continue with individual, group, milieu and occupational therapy using recovery principles and psycho-education about accepting illness and the need for treatment  Safety   Safety and communication plan established to target dynamic risk factors discussed above  Discharge Plan     most likely to a group home with the act team again     Interval Progress    Patient continues to do well  on the unit with no episodes of yelling or screaming in the last several days in a row and no need for p r n  meds for the same  However still delusional about people hypnotized sing her and receiving messages from TV and people living upstairs trying to stalk her and talking to her through the vents on the ceiling but does not bring them up unless asked  She has been friendly pleasant attending groups and is anticipating to hear from the group home and hoping to be discharged in the near future     Sleep:                         Good   appetite:                      good   compliance with medications :      compliant   Side  Effects:                                None reported today   significant events:                        I improving with no episodes of screaming but still delusional  And manageable   group attendance:                         Attending most of the groups     Mental Status Exam  Appearance: age appropriate, casually dressed, looks older than stated age, underweight    found standing by her bed in her room and was friendly and pleasant and greeted me with a smile  Behavior: normal, pleasant, cooperative, appears anxious, mildly anxious,  more friendly today and not confrontational     Speech: fluent, clear, coherent, increased rate, pressured, hypertalkative,   Circumstantial pressured    Mood: depressed, anxious, euphoric  Affect: labile, reactive, slightly brighter    Thought Process: normal abstract reasoning, less prominent, tends to be pressured and circumstantial and preoccupied perseverating on discharge    Thought Content: some paranoia, ideas of reference, but does appear as if paranoid  No current suicidal homicidal thoughts intent or plans verbalized  No phobias obsessions compulsions or distorted body perceptions elicited  Also believes TV is sending her messages from movie stars checking her if she is good or bad and threatening to get the feds to go after her  Still questions if staff is hypnotizing  her  Perceptual Disturbances: no auditory hallucinations, no visual hallucinations, denies auditory hallucinations when asked, does not appear responding to internal stimuli, no voices reported today     No vices today   Hx Risk Factors: chronic mood disorder, chronic psychotic symptoms, alcohol use, limted insight  Sensorium:        Oriented to 3 spheres and to situation  Cognition: recent and remote memory grossly intact  Consciousness: alert and awake  Attention: attention span and concentration are age appropriate  Intellect: appears to be of average intelligence  Insight: improving  Judgement: improving  Motor Activity: no abnormal movements     Vitals  Temp:  [97 3 °F (36 3 °C)-98 7 °F (37 1 °C)] 98 7 °F (37 1 °C)  HR:  [79-99] 99  Resp:  [17-19] 17  BP: (108-117)/(55-78) 111/76  No intake or output data in the 24 hours ending 06/23/21 0824    Lab Results:  [facility] Admission Reviewed     Current Facility-Administered Medications   Medication Dose Route Frequency Provider Last Rate    acetaminophen  650 mg Oral Q6H PRN Padmini Nelson MD      acetaminophen  650 mg Oral Q4H PRN Padmini Nelson MD      acetaminophen  975 mg Oral Q6H PRN Padmini Nelson MD      al mag oxide-diphenhydramine-lidocaine viscous  10 mL Swish & Swallow Q4H PRN Padmini Nelson MD      albuterol  2 puff Inhalation Q6H PRN Padmini Nelson MD      aluminum-magnesium hydroxide-simethicone  15 mL Oral Q4H PRN Padmini Nelson MD  benztropine  1 mg Intramuscular Q4H PRN Max 6/day Arcenio Bañuelos MD      benztropine  1 mg Oral Q4H PRN Max 6/day Arcenio Bañuelos MD      benztropine  1 mg Oral BID Arcenio Bañuelos MD      calcium carbonate  500 mg Oral TID PRN Arcenio Bañuelos MD      Diclofenac Sodium  2 g Topical 4x Daily PRN Arcenio Bañuelos MD      diphenhydrAMINE  25 mg Oral HS Arcenio Bañuelos MD      fenofibrate  145 mg Oral Daily Arcenio Bañuelos MD      gabapentin  600 mg Oral 4x Daily Arcenio Bañuelos MD      haloperidol  1 mg Oral 4x Daily Arcenio Bañuelos MD      haloperidol  5 mg Oral Q6H PRN Arcenio Bañuelos MD      hydrOXYzine HCL  25 mg Oral Q6H PRN Max 4/day Arcenio Bañuelos MD      hydrOXYzine HCL  50 mg Oral Q6H PRN Max 4/day Arcenio Bañuelos MD      lidocaine  1 patch Topical Daily Arcenio Bañuelos MD      LORazepam  1 mg Intramuscular Q6H PRN Arcenio Bañuelos MD      LORazepam  0 5 mg Oral 4x Daily Arcenio Bañuelos MD      magnesium hydroxide  30 mL Oral Daily PRN Arcenio Bañuelos MD      methocarbamol  500 mg Oral Q8H PRN Arcenio Bañuelos MD      nicotine  1 patch Transdermal Daily Arcenio Bañuelos MD      nicotine polacrilex  4 mg Oral Q2H PRN Arcenio Bañuelos MD      OLANZapine  2 5 mg Oral Q8H PRN Arcenio Bañuelos MD      OLANZapine  5 mg Oral Q3H PRN Arcenio Bañuelos MD      OLANZapine  7 5 mg Oral Q3H PRN Arcenio Bañuelos MD      OLANZapine  10 mg Intramuscular Q3H PRN Arcenio Bañuelos MD      OLANZapine  5 mg Intramuscular Q3H PRN Arcenio Bañuelos MD      OLANZapine  10 mg Oral HS Arcenio Bañuelos MD      OLANZapine  15 mg Oral Daily Arcenio Bañuelos MD      paliperidone palmitate ER  234 mg Intramuscular Q30 Days Arcenio Bañuelos MD      pantoprazole  40 mg Oral Early Morning Arcenio Bañuelos MD      Pramox-PE-Glycerin-Petrolatum  1 application Rectal 4x Daily PRN Arcenio Bañuelos MD      prazosin  1 mg Oral HS Arcenio Bañuelos MD      psyllium  1 packet Oral Daily Arcenio Bañuelos MD      sertraline  100 mg Oral Daily Arcenio Bañuelos MD         Counseling / Coordination of Care:  Total floor / unit time spent today 15 minutes  Greater than 50% of total time was spent with the patient and / or family counseling and / or somewhat receptive to supportive listening and teaching positive coping skills to deal with symptom mangement  Patient's Rights, confidentiality and exceptions to confidentiality, use of automated medical record, [facility] staff access to medical record, and consent to treatment reviewed  This note was  not shared with the patient because it might further aggravate her psychiatric condition  This note has been dictated

## 2021-06-23 NOTE — PROGRESS NOTES
06/23/21 1013   Team Meeting   Meeting Type Daily Rounds   Initial Conference Date 06/23/21   Patient/Family Present   Patient Present No   Patient's Family Present No       Daily Rounds Documentation  Team Members Participating  MD Mavis Head, RN  Eugene Sullivan, DECLANW  Gian Guerra, DECLANW  Christi Connors, CPS    Case reviewed  6/6 groups  No issues  SW proactive in facilitating interview with [facility] for [facility] for placement  Awaiting [facility] response

## 2021-06-23 NOTE — NURSING NOTE
Alert, cooperative and visible intermittently  No SI or HI noted  Denies depression, anxiety and pain  Pt noted yelling from room x1 this shift  Upon writer entering room pt stated she can't understand why they could just do this to her and continue to hypnotize her  Consumed 100% of breakfast and 100% of lunch  Took all medication without prompting  Maintained on safe precautions without incident   Will continue to monitor progress and recovery program

## 2021-06-23 NOTE — NURSING NOTE
Patient is visible and and calm  She used the "pad/tablet" from the unit during electronics hour  She c/o bilateral leg pain and requested/received Robaxin at 1605 which she said treated the discomfort successfully  HS Minipress held due to hypotension parameters  There were no outbursts; no episodes of yelling

## 2021-06-23 NOTE — NURSING NOTE
Patient visible on unit at intervals  Became angry, yelling while on the phone  Redirectable  Medication and meal compliant  Attended group this pm   Denies suicidal ideations  Offers no complaints

## 2021-06-23 NOTE — PROGRESS NOTES
06/22/21 1300   Activity/Group Checklist   Group Other (Comment)  (Group Art Therapy/Psychodynamic, Open Choice)   Attendance Attended   Attendance Duration (min) 46-60   Interactions Interacted appropriately   Affect/Mood Appropriate   Goals Achieved Able to listen to others; Able to engage in interactions; Able to recieve feedback; Able to give feedback to another

## 2021-06-23 NOTE — PLAN OF CARE
Problem: Alteration in Thoughts and Perception  Goal: Treatment Goal: Gain control of psychotic behaviors/thinking, reduce/eliminate presenting symptoms and demonstrate improved reality functioning upon discharge  Outcome: Progressing  Goal: Verbalize thoughts and feelings  Description: Interventions:  - Promote a nonjudgmental and trusting relationship with the patient through active listening and therapeutic communication  - Assess patient's level of functioning, behavior and potential for risk  - Engage patient in 1 on 1 interactions  - Encourage patient to express fears, feelings, frustrations, and discuss symptoms    - [place] patient to reality, help patient recognize reality-based thinking   - Administer medications as ordered and assess for potential side effects  - Provide the patient education related to the signs and symptoms of the illness and desired effects of prescribed medications  Outcome: Progressing  Goal: Refrain from acting on delusional thinking/internal stimuli  Description: Interventions:  - Monitor patient closely, per order   - Utilize least restrictive measures   - Set reasonable limits, give positive feedback for acceptable   - Administer medications as ordered and monitor of potential side effects  Outcome: Not Progressing  Goal: Agree to be compliant with medication regime, as prescribed and report medication side effects  Description: Interventions:  - Offer appropriate PRN medication and supervise ingestion; conduct AIMS, as needed   Outcome: Progressing  Goal: Attend and participate in unit activities, including therapeutic, recreational, and educational groups  Description: Interventions:  -Encourage Visitation and family involvement in care  Outcome: Progressing  Goal: Recognize dysfunctional thoughts, communicate reality-based thoughts at the time of discharge  Description: Interventions:  - Provide medication and psycho-education to assist patient in compliance and developing insight into his/her illness   Outcome: Not Progressing  Goal: Complete daily ADLs, including personal hygiene independently, as able  Description: Interventions:  - Observe, teach, and assist patient with ADLS  - Monitor and promote a balance of rest/activity, with adequate nutrition and elimination   Outcome: Progressing     Problem: Ineffective Coping  Goal: Cooperates with admission process  Description: Interventions:   - Complete admission process  Outcome: Progressing  Goal: Identifies ineffective coping skills  Outcome: Progressing  Goal: Identifies healthy coping skills  Outcome: Progressing  Goal: Demonstrates healthy coping skills  Outcome: Progressing  Goal: Participates in unit activities  Description: Interventions:  - Provide therapeutic environment   - Provide required programming   - Redirect inappropriate behaviors   Outcome: Progressing  Goal: Patient/Family participate in treatment and DC plans  Description: Interventions:  - Provide therapeutic environment  Outcome: Progressing  Goal: Patient/Family verbalizes awareness of resources  Outcome: Progressing  Goal: Understands least restrictive measures  Description: Interventions:  - Utilize least restrictive behavior  Outcome: Progressing  Goal: Free from restraint events  Description: - Utilize least restrictive measures   - Provide behavioral interventions   - Redirect inappropriate behaviors   Outcome: Progressing     Problem: Risk for Self Injury/Neglect  Goal: Treatment Goal: Remain safe during length of stay, learn and adopt new coping skills, and be free of self-injurious ideation, impulses and acts at the time of discharge  Outcome: Progressing  Goal: Verbalize thoughts and feelings  Description: Interventions:  - Assess and re-assess patient's lethality and potential for self-injury  - Engage patient in 1:1 interactions, daily, for a minimum of 15 minutes  - Encourage patient to express feelings, fears, frustrations, hopes  - Establish rapport/trust with patient   Outcome: Progressing  Goal: Refrain from harming self  Description: Interventions:  - Monitor patient closely, per order  - Develop a trusting relationship  - Supervise medication ingestion, monitor effects and side effects   Outcome: Progressing  Goal: Attend and participate in unit activities, including therapeutic, recreational, and educational groups  Description: Interventions:  - Provide therapeutic and educational activities daily, encourage attendance and participation, and document same in the medical record  - Obtain collateral information, encourage visitation and family involvement in care   Outcome: Progressing  Goal: Recognize maladaptive responses and adopt new coping mechanisms  Outcome: Not Progressing  Goal: Complete daily ADLs, including personal hygiene independently, as able  Description: Interventions:  - Observe, teach, and assist patient with ADLS  - Monitor and promote a balance of rest/activity, with adequate nutrition and elimination  Outcome: Progressing     Problem: Depression  Goal: Treatment Goal: Demonstrate behavioral control of depressive symptoms, verbalize feelings of improved mood/affect, and adopt new coping skills prior to discharge  Outcome: Progressing  Goal: Verbalize thoughts and feelings  Description: Interventions:  - Assess and re-assess patient's level of risk   - Engage patient in 1:1 interactions, daily, for a minimum of 15 minutes   - Encourage patient to express feelings, fears, frustrations, hopes   Outcome: Progressing  Goal: Refrain from harming self  Description: Interventions:  - Monitor patient closely, per order   - Supervise medication ingestion, monitor effects and side effects   Outcome: Progressing  Goal: Refrain from isolation  Description: Interventions:  - Develop a trusting relationship   - Encourage socialization   Outcome: Progressing  Goal: Refrain from self-neglect  Outcome: Progressing  Goal: Attend and participate in unit activities, including therapeutic, recreational, and educational groups  Description: Interventions:  - Provide therapeutic and educational activities daily, encourage attendance and participation, and document same in the medical record   Outcome: Progressing  Goal: Complete daily ADLs, including personal hygiene independently, as able  Description: Interventions:  - Observe, teach, and assist patient with ADLS  -  Monitor and promote a balance of rest/activity, with adequate nutrition and elimination   Outcome: Progressing     Problem: Anxiety  Goal: Anxiety is at manageable level  Description: Interventions:  - Assess and monitor patient's anxiety level  - Monitor for signs and symptoms (heart palpitations, chest pain, shortness of breath, headaches, nausea, feeling jumpy, restlessness, irritable, apprehensive)  - Collaborate with interdisciplinary team and initiate plan and interventions as ordered    - [place] patient to unit/surroundings  - Explain treatment plan  - Encourage participation in care  - Encourage verbalization of concerns/fears  - Identify coping mechanisms  - Assist in developing anxiety-reducing skills  - Administer/offer alternative therapies  - Limit or eliminate stimulants  Outcome: Progressing     Problem: Risk for Violence/Aggression Toward Others  Goal: Treatment Goal: Refrain from acts of violence/aggression during length of stay, and demonstrate improved impulse control at the time of discharge  Outcome: Progressing  Goal: Verbalize thoughts and feelings  Description: Interventions:  - Assess and re-assess patient's level of risk, every waking shift  - Engage patient in 1:1 interactions, daily, for a minimum of 15 minutes   - Allow patient to express feelings and frustrations in a safe and non-threatening manner   - Establish rapport/trust with patient   Outcome: Progressing  Goal: Refrain from harming others  Outcome: Progressing  Goal: Refrain from destructive acts on the environment or property  Outcome: Progressing  Goal: Control angry outbursts  Description: Interventions:  - Monitor patient closely, per order  - Ensure early verbal de-escalation  - Monitor prn medication needs  - Set reasonable/therapeutic limits, outline behavioral expectations, and consequences   - Provide a non-threatening milieu, utilizing the least restrictive interventions   Outcome: Progressing  Goal: Attend and participate in unit activities, including therapeutic, recreational, and educational groups  Description: Interventions:  - Provide therapeutic and educational activities daily, encourage attendance and participation, and document same in the medical record   Outcome: Progressing  Goal: Identify appropriate positive anger management techniques  Description: Interventions:  - Offer anger management and coping skills groups   - Staff will provide positive feedback for appropriate anger control  Outcome: Progressing     Problem: Alteration in Orientation  Goal: Treatment Goal: Demonstrate a reduction of confusion and improved orientation to person, place, time and/or situation upon discharge, according to optimum baseline/ability  Outcome: Progressing  Goal: Interact with staff daily  Description: Interventions:  - Assess and re-assess patient's level of orientation  - Engage patient in 1 on 1 interactions, daily, for a minimum of 15 minutes   - Establish rapport/trust with patient   Outcome: Progressing  Goal: Express concerns related to confused thinking related to:  Description: Interventions:  - Encourage patient to express feelings, fears, frustrations, hopes  - Assign consistent caregivers   - [place]/re-orient patient as needed  - Allow comfort items, as appropriate  - Provide visual cues, signs, etc    Outcome: Not Progressing  Goal: Allow medical examinations, as recommended  Description: Interventions:  - Provide physical/neurological exams and/or referrals, per provider   Outcome: Progressing  Goal: Cooperate with recommended testing/procedures  Description: Interventions:  - Determine need for ancillary testing  - Observe for mental status changes  - Implement falls/precaution protocol   Outcome: Progressing  Goal: Attend and participate in unit activities, including therapeutic, recreational, and educational groups  Description: Interventions:  - Provide therapeutic and educational activities daily, encourage attendance and participation, and document same in the medical record   - Provide appropriate opportunities for reminiscence   - Provide a consistent daily routine   - Encourage family contact/visitation   Outcome: Progressing  Goal: Complete daily ADLs, including personal hygiene independently, as able  Description: Interventions:  - Observe, teach, and assist patient with ADLS  Outcome: Progressing     Problem: Individualized Interventions  Goal: Patient will verbalize appropriate use of telephone within 5 days  Description: Interventions:  - Treatment team to determine use of supervised phone privileges   Outcome: Progressing  Goal: Patient will verbalize need for hospitalization and will no longer attempt elopement within 5 days  Description: Interventions:  - Ongoing education to help patient understand need for hospitalization  Outcome: Progressing  Goal: Patient will recognize inappropriate behaviors and develop alternative behaviors within 5 days  Description: Interventions:  - Patient in collaboration with Treatment Team will develop a behavior management plan to help identify effective coping skills to deal with stressors  Outcome: Not Progressing

## 2021-06-24 PROCEDURE — 99232 SBSQ HOSP IP/OBS MODERATE 35: CPT | Performed by: STUDENT IN AN ORGANIZED HEALTH CARE EDUCATION/TRAINING PROGRAM

## 2021-06-24 RX ADMIN — GABAPENTIN 600 MG: 300 CAPSULE ORAL at 21:12

## 2021-06-24 RX ADMIN — BENZTROPINE MESYLATE 1 MG: 1 TABLET ORAL at 08:19

## 2021-06-24 RX ADMIN — DICLOFENAC SODIUM 2 G: 10 GEL TOPICAL at 08:49

## 2021-06-24 RX ADMIN — SERTRALINE HYDROCHLORIDE 100 MG: 100 TABLET ORAL at 08:19

## 2021-06-24 RX ADMIN — OLANZAPINE 10 MG: 10 TABLET, FILM COATED ORAL at 21:12

## 2021-06-24 RX ADMIN — LORAZEPAM 0.5 MG: 0.5 TABLET ORAL at 08:20

## 2021-06-24 RX ADMIN — BENZTROPINE MESYLATE 1 MG: 1 TABLET ORAL at 17:28

## 2021-06-24 RX ADMIN — GABAPENTIN 600 MG: 300 CAPSULE ORAL at 17:28

## 2021-06-24 RX ADMIN — LORAZEPAM 0.5 MG: 0.5 TABLET ORAL at 21:12

## 2021-06-24 RX ADMIN — HALOPERIDOL 5 MG: 5 TABLET ORAL at 14:22

## 2021-06-24 RX ADMIN — HALOPERIDOL 1 MG: 1 TABLET ORAL at 21:12

## 2021-06-24 RX ADMIN — PANTOPRAZOLE SODIUM 40 MG: 40 TABLET, DELAYED RELEASE ORAL at 06:08

## 2021-06-24 RX ADMIN — GLYCERIN, PETROLATUM, PHENYLEPHRINE HCL, PRAMOXINE HCL 1 APPLICATION: 144; 2.5; 10; 15 CREAM TOPICAL at 19:55

## 2021-06-24 RX ADMIN — LORAZEPAM 0.5 MG: 0.5 TABLET ORAL at 11:58

## 2021-06-24 RX ADMIN — HALOPERIDOL 1 MG: 1 TABLET ORAL at 08:20

## 2021-06-24 RX ADMIN — LORAZEPAM 0.5 MG: 0.5 TABLET ORAL at 17:28

## 2021-06-24 RX ADMIN — HALOPERIDOL 1 MG: 1 TABLET ORAL at 11:58

## 2021-06-24 RX ADMIN — GABAPENTIN 600 MG: 300 CAPSULE ORAL at 11:58

## 2021-06-24 RX ADMIN — GABAPENTIN 600 MG: 300 CAPSULE ORAL at 08:20

## 2021-06-24 RX ADMIN — METHOCARBAMOL TABLETS 500 MG: 500 TABLET, COATED ORAL at 10:54

## 2021-06-24 RX ADMIN — NICOTINE 1 PATCH: 21 PATCH, EXTENDED RELEASE TRANSDERMAL at 08:24

## 2021-06-24 RX ADMIN — OLANZAPINE 15 MG: 5 TABLET, FILM COATED ORAL at 08:19

## 2021-06-24 RX ADMIN — LIDOCAINE 1 PATCH: 50 PATCH CUTANEOUS at 08:20

## 2021-06-24 RX ADMIN — DIPHENHYDRAMINE HCL 25 MG: 25 TABLET ORAL at 21:12

## 2021-06-24 RX ADMIN — DICLOFENAC SODIUM 2 G: 10 GEL TOPICAL at 19:55

## 2021-06-24 RX ADMIN — HALOPERIDOL 1 MG: 1 TABLET ORAL at 17:28

## 2021-06-24 RX ADMIN — PSYLLIUM HUSK 1 PACKET: 3.4 POWDER ORAL at 08:20

## 2021-06-24 RX ADMIN — FENOFIBRATE 145 MG: 145 TABLET ORAL at 08:19

## 2021-06-24 NOTE — PLAN OF CARE
Problem: Alteration in Thoughts and Perception  Goal: Treatment Goal: Gain control of psychotic behaviors/thinking, reduce/eliminate presenting symptoms and demonstrate improved reality functioning upon discharge  Outcome: Progressing  Goal: Verbalize thoughts and feelings  Description: Interventions:  - Promote a nonjudgmental and trusting relationship with the patient through active listening and therapeutic communication  - Assess patient's level of functioning, behavior and potential for risk  - Engage patient in 1 on 1 interactions  - Encourage patient to express fears, feelings, frustrations, and discuss symptoms    - [place] patient to reality, help patient recognize reality-based thinking   - Administer medications as ordered and assess for potential side effects  - Provide the patient education related to the signs and symptoms of the illness and desired effects of prescribed medications  Outcome: Progressing  Goal: Refrain from acting on delusional thinking/internal stimuli  Description: Interventions:  - Monitor patient closely, per order   - Utilize least restrictive measures   - Set reasonable limits, give positive feedback for acceptable   - Administer medications as ordered and monitor of potential side effects  Outcome: Not Progressing  Goal: Agree to be compliant with medication regime, as prescribed and report medication side effects  Description: Interventions:  - Offer appropriate PRN medication and supervise ingestion; conduct AIMS, as needed   Outcome: Progressing  Goal: Attend and participate in unit activities, including therapeutic, recreational, and educational groups  Description: Interventions:  -Encourage Visitation and family involvement in care  Outcome: Progressing  Goal: Recognize dysfunctional thoughts, communicate reality-based thoughts at the time of discharge  Description: Interventions:  - Provide medication and psycho-education to assist patient in compliance and developing insight into his/her illness   Outcome: Not Progressing  Goal: Complete daily ADLs, including personal hygiene independently, as able  Description: Interventions:  - Observe, teach, and assist patient with ADLS  - Monitor and promote a balance of rest/activity, with adequate nutrition and elimination   Outcome: Progressing     Problem: Ineffective Coping  Goal: Cooperates with admission process  Description: Interventions:   - Complete admission process  Outcome: Progressing  Goal: Identifies ineffective coping skills  Outcome: Progressing  Goal: Identifies healthy coping skills  Outcome: Progressing  Goal: Demonstrates healthy coping skills  Outcome: Progressing  Goal: Participates in unit activities  Description: Interventions:  - Provide therapeutic environment   - Provide required programming   - Redirect inappropriate behaviors   Outcome: Progressing  Goal: Patient/Family participate in treatment and DC plans  Description: Interventions:  - Provide therapeutic environment  Outcome: Progressing  Goal: Patient/Family verbalizes awareness of resources  Outcome: Progressing  Goal: Understands least restrictive measures  Description: Interventions:  - Utilize least restrictive behavior  Outcome: Progressing  Goal: Free from restraint events  Description: - Utilize least restrictive measures   - Provide behavioral interventions   - Redirect inappropriate behaviors   Outcome: Progressing     Problem: Risk for Self Injury/Neglect  Goal: Treatment Goal: Remain safe during length of stay, learn and adopt new coping skills, and be free of self-injurious ideation, impulses and acts at the time of discharge  Outcome: Progressing  Goal: Verbalize thoughts and feelings  Description: Interventions:  - Assess and re-assess patient's lethality and potential for self-injury  - Engage patient in 1:1 interactions, daily, for a minimum of 15 minutes  - Encourage patient to express feelings, fears, frustrations, hopes  - Establish rapport/trust with patient   Outcome: Progressing  Goal: Refrain from harming self  Description: Interventions:  - Monitor patient closely, per order  - Develop a trusting relationship  - Supervise medication ingestion, monitor effects and side effects   Outcome: Progressing  Goal: Attend and participate in unit activities, including therapeutic, recreational, and educational groups  Description: Interventions:  - Provide therapeutic and educational activities daily, encourage attendance and participation, and document same in the medical record  - Obtain collateral information, encourage visitation and family involvement in care   Outcome: Progressing  Goal: Recognize maladaptive responses and adopt new coping mechanisms  Outcome: Progressing  Goal: Complete daily ADLs, including personal hygiene independently, as able  Description: Interventions:  - Observe, teach, and assist patient with ADLS  - Monitor and promote a balance of rest/activity, with adequate nutrition and elimination  Outcome: Progressing     Problem: Depression  Goal: Treatment Goal: Demonstrate behavioral control of depressive symptoms, verbalize feelings of improved mood/affect, and adopt new coping skills prior to discharge  Outcome: Progressing  Goal: Verbalize thoughts and feelings  Description: Interventions:  - Assess and re-assess patient's level of risk   - Engage patient in 1:1 interactions, daily, for a minimum of 15 minutes   - Encourage patient to express feelings, fears, frustrations, hopes   Outcome: Progressing  Goal: Refrain from harming self  Description: Interventions:  - Monitor patient closely, per order   - Supervise medication ingestion, monitor effects and side effects   Outcome: Progressing  Goal: Refrain from isolation  Description: Interventions:  - Develop a trusting relationship   - Encourage socialization   Outcome: Progressing  Goal: Refrain from self-neglect  Outcome: Progressing  Goal: Attend and participate in unit activities, including therapeutic, recreational, and educational groups  Description: Interventions:  - Provide therapeutic and educational activities daily, encourage attendance and participation, and document same in the medical record   Outcome: Progressing  Goal: Complete daily ADLs, including personal hygiene independently, as able  Description: Interventions:  - Observe, teach, and assist patient with ADLS  -  Monitor and promote a balance of rest/activity, with adequate nutrition and elimination   Outcome: Progressing     Problem: Anxiety  Goal: Anxiety is at manageable level  Description: Interventions:  - Assess and monitor patient's anxiety level  - Monitor for signs and symptoms (heart palpitations, chest pain, shortness of breath, headaches, nausea, feeling jumpy, restlessness, irritable, apprehensive)  - Collaborate with interdisciplinary team and initiate plan and interventions as ordered    - [place] patient to unit/surroundings  - Explain treatment plan  - Encourage participation in care  - Encourage verbalization of concerns/fears  - Identify coping mechanisms  - Assist in developing anxiety-reducing skills  - Administer/offer alternative therapies  - Limit or eliminate stimulants  Outcome: Progressing     Problem: Risk for Violence/Aggression Toward Others  Goal: Treatment Goal: Refrain from acts of violence/aggression during length of stay, and demonstrate improved impulse control at the time of discharge  Outcome: Progressing  Goal: Verbalize thoughts and feelings  Description: Interventions:  - Assess and re-assess patient's level of risk, every waking shift  - Engage patient in 1:1 interactions, daily, for a minimum of 15 minutes   - Allow patient to express feelings and frustrations in a safe and non-threatening manner   - Establish rapport/trust with patient   Outcome: Progressing  Goal: Refrain from harming others  Outcome: Progressing  Goal: Refrain from destructive acts on the environment or property  Outcome: Progressing  Goal: Control angry outbursts  Description: Interventions:  - Monitor patient closely, per order  - Ensure early verbal de-escalation  - Monitor prn medication needs  - Set reasonable/therapeutic limits, outline behavioral expectations, and consequences   - Provide a non-threatening milieu, utilizing the least restrictive interventions   Outcome: Progressing  Goal: Attend and participate in unit activities, including therapeutic, recreational, and educational groups  Description: Interventions:  - Provide therapeutic and educational activities daily, encourage attendance and participation, and document same in the medical record   Outcome: Progressing  Goal: Identify appropriate positive anger management techniques  Description: Interventions:  - Offer anger management and coping skills groups   - Staff will provide positive feedback for appropriate anger control  Outcome: Progressing     Problem: Alteration in Orientation  Goal: Treatment Goal: Demonstrate a reduction of confusion and improved orientation to person, place, time and/or situation upon discharge, according to optimum baseline/ability  Outcome: Progressing  Goal: Interact with staff daily  Description: Interventions:  - Assess and re-assess patient's level of orientation  - Engage patient in 1 on 1 interactions, daily, for a minimum of 15 minutes   - Establish rapport/trust with patient   Outcome: Progressing  Goal: Express concerns related to confused thinking related to:  Description: Interventions:  - Encourage patient to express feelings, fears, frustrations, hopes  - Assign consistent caregivers   - [place]/re-orient patient as needed  - Allow comfort items, as appropriate  - Provide visual cues, signs, etc    Outcome: Progressing  Goal: Allow medical examinations, as recommended  Description: Interventions:  - Provide physical/neurological exams and/or referrals, per provider   Outcome: Progressing  Goal: Cooperate with recommended testing/procedures  Description: Interventions:  - Determine need for ancillary testing  - Observe for mental status changes  - Implement falls/precaution protocol   Outcome: Progressing  Goal: Attend and participate in unit activities, including therapeutic, recreational, and educational groups  Description: Interventions:  - Provide therapeutic and educational activities daily, encourage attendance and participation, and document same in the medical record   - Provide appropriate opportunities for reminiscence   - Provide a consistent daily routine   - Encourage family contact/visitation   Outcome: Progressing  Goal: Complete daily ADLs, including personal hygiene independently, as able  Description: Interventions:  - Observe, teach, and assist patient with ADLS  Outcome: Progressing

## 2021-06-24 NOTE — PROGRESS NOTES
06/24/21 1119   Team Meeting   Meeting Type Daily Rounds   Initial Conference Date 06/24/21   Patient/Family Present   Patient Present No   Patient's Family Present No       Daily Rounds Documentation  Team Members Participating  Dr Noel Guzman, RN  Alix Thibodeaux, [place]  Wilbur Casarez, ANNALISE      Case reviewed  No yelling  Robaxin PRN given  No issues  Anticipating [address] interview with [facility] early July

## 2021-06-24 NOTE — PROGRESS NOTES
Progress Note - Behavioral Health   Zeynep Leblanc 49 y o  female MRN: [id number]  Unit/Bed#: [facility] 112-01 Encounter: [id number]    All documentation, nursing notes, labs, and vitals reviewed  The patient's medication reconciliation chart was also analyzed for medication adherence  I personally evaluated Zeynep Leblanc and discussed current care with treatment team  No acute events overnight  Rhina Zuleta reports that she is "doing a lot better" and endorses psychiatric stability on her current medication regimen  Staff reports that she is anticipating [address] interview with [facility] in the upcoming weeks  Rhina Zuleta is optimistic regarding this  On examination, she is pleasant, cooperative, and appropriately engaging  She denies depressive symptomatology  Her sleep and appetite are "great"  Her energy and motivation are satisfactory  She is concerned at times regarding her roommate as she makes inappropriate comments about Rhina Zuleta but is able to reality-test these thoughts  Rhina Zuleta does not report any SI/HI  She is visible on the unit and attended 4/6 groups  She is utilizing PRN Robaxin with good effect  She currently denies symptomatology suggestive of sudheer/hypomania and does not exhibit grandiosity or psychomotor restlessness  She is without irritability  She is not overtly psychotic  Rhina Zuleta denies medication adverse side effects  She offers no further concerns        Mental Status Evaluation:    Appearance:  age appropriate, casually dressed, dressed appropriately   Behavior:  normal, pleasant, cooperative, calm   Speech:  normal rate, normal volume, normal pitch   Mood:  normal, euthymic   Affect:  normal range and intensity, appropriate   Thought Process:  organized, logical, coherent   Associations: intact associations   Thought Content:  no overt delusions   Perceptual Disturbances: no auditory hallucinations, no visual hallucinations   Risk Potential: Suicidal ideation - None at present  Homicidal ideation - None at present  Potential for aggression - No   Sensorium:  oriented to person, place and time/date   Memory:  recent and remote memory grossly intact   Consciousness:  alert and awake    Attention: attention span and concentration are age appropriate   Insight:  fair and improving   Judgment: fair and improving   Gait/Station: normal gait/station   Motor Activity: no abnormal movements       Assessment:     Principal Problem:    Schizoaffective disorder, bipolar type ([facility] [address] )  Active Problems:    Post-traumatic stress disorder, chronic    Medical clearance for psychiatric admission    Mild intermittent asthma without complication    Ear problem, bilateral    Gastroesophageal reflux disease    Right foot pain    Ear problem, right      Plan/Recommended Treatment:     - Continue with pharmacotherapy, group therapy, milieu therapy and occupational therapy  - Risks/benefits/alternatives to treatment discussed and Bettie Calvillo continues to verbalize understanding   - No psychopharmacologic changes necessary at this juncture   - Will consider further optimization of psychotropic medication regimen as hospital course progresses   - Continue to assess for adverse medication side effects   - Encourage Bettie Calvillo to participate in nonverbal forms of therapy including journaling and art/music therapy  - Continue precautionary Q7-minute safety checks  - Continue to engage case management/SW to assist with collateral information, discharge planning, and the implementation of an individualized, patient-centered plan of care    - The patient will be maintained on the following medications:    Current Facility-Administered Medications   Medication Dose Route Frequency Provider Last Rate    acetaminophen  650 mg Oral Q6H PRN Antonia Turner MD      acetaminophen  650 mg Oral Q4H PRN Antonia Turner MD      acetaminophen  975 mg Oral Q6H PRN Antonia Turner MD      al mag oxide-diphenhydramine-lidocaine viscous  10 mL Swish & Swallow Q4H PRN MD Phong Montenegro albuterol  2 puff Inhalation Q6H PRN Jayne Chapman MD      aluminum-magnesium hydroxide-simethicone  15 mL Oral Q4H PRN Jayne Chapman MD      benztropine  1 mg Intramuscular Q4H PRN Max 6/day Jayne Chapman, MD      benztropine  1 mg Oral Q4H PRN Max 6/day Jayne Chapman, MD      benztropine  1 mg Oral BID Jayne Chapman MD      calcium carbonate  500 mg Oral TID PRN Jayne Chapman, MD      Diclofenac Sodium  2 g Topical 4x Daily PRN Jayne Chapman, MD      diphenhydrAMINE  25 mg Oral HS Jayne Chapman, MD      fenofibrate  145 mg Oral Daily Jayne Chapman, MD      gabapentin  600 mg Oral 4x Daily Jayne Chapman, MD      haloperidol  1 mg Oral 4x Daily Jayne Chapman, MD      haloperidol  5 mg Oral Q6H PRN Jayne Chapman, MD      hydrOXYzine HCL  25 mg Oral Q6H PRN Max 4/day Jayne Chapman, MD      hydrOXYzine HCL  50 mg Oral Q6H PRN Max 4/day Jayne Chapman, MD      lidocaine  1 patch Topical Daily Jayne Chapman, MD      LORazepam  1 mg Intramuscular Q6H PRN Jayne Chapman MD      LORazepam  0 5 mg Oral 4x Daily Jayne Chapman MD      magnesium hydroxide  30 mL Oral Daily PRN Jayne Chapman, MD      methocarbamol  500 mg Oral Q8H PRN Jayne Chapman, MD      nicotine  1 patch Transdermal Daily Jayne Chapman MD      nicotine polacrilex  4 mg Oral Q2H PRN Jayne Chapman, MD      OLANZapine  2 5 mg Oral Q8H PRN Jayne Chapman, MD      OLANZapine  5 mg Oral Q3H PRN Jayne Chapman, MD      OLANZapine  7 5 mg Oral Q3H PRN Jayne Chapman, MD      OLANZapine  10 mg Intramuscular Q3H PRN Jayne Chapman, MD      OLANZapine  5 mg Intramuscular Q3H PRN Jayne Chapman, MD      OLANZapine  10 mg Oral HS Jayne Chapman, MD      OLANZapine  15 mg Oral Daily Jayne Chapman, MD      paliperidone palmitate ER  234 mg Intramuscular Q30 Days Jayne Chapman MD      pantoprazole  40 mg Oral Early Morning Jayne Chapman, MD      Pramox-PE-Glycerin-Petrolatum  1 application Rectal 4x Daily PRN Jayne Chapman MD      prazosin  1 mg Oral HS Jayne Chapman MD      psyllium  1 packet Oral Daily Carrie Barrera MD      sertraline  100 mg Oral Daily Carrie Barrera MD

## 2021-06-24 NOTE — PLAN OF CARE
Message received from [facility] in regards to scheduling intake interview for patient, to consider her for residential programming  Interview scheduled for 7/6/21 at [id number]  Pt informed and was happy to hear of this progress in her discharge planning  SW to follow up with new ACT referral with Melly cazares  Pt has been medication and meal compliant, attending over half of groups, participatory and pleasant  She has had minimal to no outbursts, her yelling has diminished greatly  Pt has been using healthy coping skills including journaling, walking, reaching out to staff for support, phoning friends and trying to be mindful of her food choices        Problem: DISCHARGE PLANNING - CARE MANAGEMENT  Goal: Discharge to post-acute care or home with appropriate resources  Description: INTERVENTIONS:  - Conduct assessment to determine patient/family and health care team treatment goals, and need for post-acute services based on payer coverage, community resources, and patient preferences, and barriers to discharge  - Address psychosocial, clinical, and financial barriers to discharge as identified in assessment in conjunction with the patient/family and health care team  - Arrange appropriate level of post-acute services according to patients   needs and preference and payer coverage in collaboration with the physician and health care team  - Communicate with and update the patient/family, physician, and health care team regarding progress on the discharge plan  - Arrange appropriate transportation to post-acute venues  Outcome: Progressing

## 2021-06-24 NOTE — NURSING NOTE
Alert, labile, and visible intermittently  No SI or HI noted  Denies depression, anxiety and pain  Pt came to nurse's station this am stating she was in a bad mood  Redirected pt with conversation  When entering pt's room to administered lioderm patches and nicoderm patch pt stated she does not understand why they have to do this to her  Treating her with hypnosis  Pt was redirect with conversation  Did not attend any groups  Consumed 100% of breakfast and 25% of lunch  Took all medication without prompting  Maintained on safe precautions without incident   Will continue to monitor progress and recovery program

## 2021-06-24 NOTE — NURSING NOTE
Patient was isolative to her room  Denies all s/s at this time  Preparation H and diclofenac cream given at [facility]  Held minipress due to parameter  Attended wrap up group  Yelling episode during wrap up group but was redirected  Cooperative and compliant with HS medications  Safety checks ongoing  AGITATED

## 2021-06-24 NOTE — NURSING NOTE
Pt leaves fresh air group yelling at self and being disruptive  Writer attempt to redirect pt and pt continues to yell at self  PRN haldol 5mg PO administered  Pt waiting in line for snack at this time

## 2021-06-25 PROCEDURE — 99232 SBSQ HOSP IP/OBS MODERATE 35: CPT | Performed by: STUDENT IN AN ORGANIZED HEALTH CARE EDUCATION/TRAINING PROGRAM

## 2021-06-25 RX ADMIN — BENZTROPINE MESYLATE 1 MG: 1 TABLET ORAL at 17:20

## 2021-06-25 RX ADMIN — LORAZEPAM 0.5 MG: 0.5 TABLET ORAL at 21:12

## 2021-06-25 RX ADMIN — DIPHENHYDRAMINE HCL 25 MG: 25 TABLET ORAL at 21:12

## 2021-06-25 RX ADMIN — LORAZEPAM 0.5 MG: 0.5 TABLET ORAL at 11:59

## 2021-06-25 RX ADMIN — HALOPERIDOL 1 MG: 1 TABLET ORAL at 21:12

## 2021-06-25 RX ADMIN — FENOFIBRATE 145 MG: 145 TABLET ORAL at 08:47

## 2021-06-25 RX ADMIN — GABAPENTIN 600 MG: 300 CAPSULE ORAL at 08:47

## 2021-06-25 RX ADMIN — PHENYTOIN 1 MG: 125 SUSPENSION ORAL at 21:12

## 2021-06-25 RX ADMIN — GABAPENTIN 600 MG: 300 CAPSULE ORAL at 21:10

## 2021-06-25 RX ADMIN — LIDOCAINE 1 PATCH: 50 PATCH CUTANEOUS at 08:47

## 2021-06-25 RX ADMIN — LORAZEPAM 0.5 MG: 0.5 TABLET ORAL at 17:20

## 2021-06-25 RX ADMIN — LORAZEPAM 0.5 MG: 0.5 TABLET ORAL at 08:47

## 2021-06-25 RX ADMIN — HALOPERIDOL 1 MG: 1 TABLET ORAL at 17:20

## 2021-06-25 RX ADMIN — HALOPERIDOL 1 MG: 1 TABLET ORAL at 11:59

## 2021-06-25 RX ADMIN — GABAPENTIN 600 MG: 300 CAPSULE ORAL at 17:20

## 2021-06-25 RX ADMIN — BENZTROPINE MESYLATE 1 MG: 1 TABLET ORAL at 08:47

## 2021-06-25 RX ADMIN — GABAPENTIN 600 MG: 300 CAPSULE ORAL at 11:59

## 2021-06-25 RX ADMIN — SERTRALINE HYDROCHLORIDE 100 MG: 100 TABLET ORAL at 08:46

## 2021-06-25 RX ADMIN — OLANZAPINE 15 MG: 5 TABLET, FILM COATED ORAL at 08:47

## 2021-06-25 RX ADMIN — HALOPERIDOL 1 MG: 1 TABLET ORAL at 08:47

## 2021-06-25 RX ADMIN — OLANZAPINE 10 MG: 10 TABLET, FILM COATED ORAL at 21:12

## 2021-06-25 RX ADMIN — METHOCARBAMOL TABLETS 500 MG: 500 TABLET, COATED ORAL at 12:05

## 2021-06-25 RX ADMIN — PANTOPRAZOLE SODIUM 40 MG: 40 TABLET, DELAYED RELEASE ORAL at 06:09

## 2021-06-25 RX ADMIN — NICOTINE 1 PATCH: 21 PATCH, EXTENDED RELEASE TRANSDERMAL at 08:50

## 2021-06-25 RX ADMIN — PSYLLIUM HUSK 1 PACKET: 3.4 POWDER ORAL at 08:50

## 2021-06-25 NOTE — PLAN OF CARE
Problem: Alteration in Thoughts and Perception  Goal: Verbalize thoughts and feelings  Description: Interventions:  - Promote a nonjudgmental and trusting relationship with the patient through active listening and therapeutic communication  - Assess patient's level of functioning, behavior and potential for risk  - Engage patient in 1 on 1 interactions  - Encourage patient to express fears, feelings, frustrations, and discuss symptoms    - [place] patient to reality, help patient recognize reality-based thinking   - Administer medications as ordered and assess for potential side effects  - Provide the patient education related to the signs and symptoms of the illness and desired effects of prescribed medications  Outcome: Progressing  Goal: Agree to be compliant with medication regime, as prescribed and report medication side effects  Description: Interventions:  - Offer appropriate PRN medication and supervise ingestion; conduct AIMS, as needed   Outcome: Progressing  Goal: Recognize dysfunctional thoughts, communicate reality-based thoughts at the time of discharge  Description: Interventions:  - Provide medication and psycho-education to assist patient in compliance and developing insight into his/her illness   Outcome: Progressing

## 2021-06-25 NOTE — PROGRESS NOTES
Progress Note - Behavioral Health   Mahalia Ormond 49 y o  female MRN: [id number]  Unit/Bed#: [facility] 112-01 Encounter: [id number]    All documentation, nursing notes, labs, and vitals reviewed  The patient's medication reconciliation chart was also analyzed for medication adherence  I personally evaluated Mahalia Ormond and discussed current care with treatment team  Yesterday afternoon, Amy Hatch became argumentative and combative without provocation  Sh appropriately requested PRN neuroleptic medication which was beneficial  Today, Amy Hatch is apologetic regarding her behavior  She states that she is "on her periods" and this was the reason she became quickly dysregulated  Discussion was held regarding avoidance of behavioral outbursts and interventions that can be implemented  She was receptive  Amy Hatch currently denies depressive symptomatology or any lethality concern  She is without SI/HI  On examination, she is not grandiose, irritable, or psychomotorically restless  She is pleasant and cooperative without evidence of sudheer/hypomania  Amy Hatch denies perceptual disturbances  She is not overtly psychotic  She is awaiting assessment on July 6th for placement  She does not report any adverse side effects  She denies any further concerns       Mental Status Evaluation:    Appearance:  age appropriate, casually dressed, dressed appropriately   Behavior:  pleasant, cooperative, calm   Speech:  normal rate, normal volume, normal pitch, fluent   Mood:  normal, euthymic   Affect:  normal range and intensity, appropriate   Thought Process:  organized, logical, coherent   Associations: intact associations   Thought Content:  no overt delusions   Perceptual Disturbances: no auditory hallucinations, no visual hallucinations   Risk Potential: Suicidal ideation - None at present  Homicidal ideation - None at present  Potential for aggression - Yes, due to agitation   Sensorium:  oriented to person, place and time/date   Memory:  recent and remote memory grossly intact   Consciousness:  alert and awake    Attention: attention span and concentration are age appropriate   Insight:  fair   Judgment: fair   Gait/Station: normal gait/station   Motor Activity: no abnormal movements       Assessment:     Principal Problem:    Schizoaffective disorder, bipolar type ([facility])  Active Problems:    Post-traumatic stress disorder, chronic    Medical clearance for psychiatric admission    Mild intermittent asthma without complication    Ear problem, bilateral    Gastroesophageal reflux disease    Right foot pain    Ear problem, right      Plan/Recommended Treatment:     - Continue with pharmacotherapy, group therapy, milieu therapy and occupational therapy  - Risks/benefits/alternatives to treatment discussed and Jayjay Zhang continues to verbalize understanding   - No psychopharmacologic changes necessary at this juncture   - Will consider further optimization of psychotropic medication regimen as hospital course progresses   - Continue to assess for adverse medication side effects   - Encourage Jayjay Zhang to participate in nonverbal forms of therapy including journaling and art/music therapy  - Continue precautionary Q7-minute safety checks  - Continue to engage case management/SW to assist with collateral information, discharge planning, and the implementation of an individualized, patient-centered plan of care    - The patient will be maintained on the following medications:    Current Facility-Administered Medications   Medication Dose Route Frequency Provider Last Rate    acetaminophen  650 mg Oral Q6H PRN Geoff Cowan MD      acetaminophen  650 mg Oral Q4H PRN Geoff Cowan MD      acetaminophen  975 mg Oral Q6H PRN Geoff Cowan MD      al mag oxide-diphenhydramine-lidocaine viscous  10 mL Swish & Swallow Q4H PRN Geoff Cowan MD      albuterol  2 puff Inhalation Q6H PRN Geoff Cowan MD      aluminum-magnesium hydroxide-simethicone  15 mL Oral Q4H PRN Geoff Cowan MD      benztropine  1 mg Intramuscular Q4H PRN Max 6/day Carrie Barrera MD      benztropine  1 mg Oral Q4H PRN Max 6/day Carrie Barrera MD      benztropine  1 mg Oral BID Carrie Barrera MD      calcium carbonate  500 mg Oral TID PRN Carrie Barrera MD      Diclofenac Sodium  2 g Topical 4x Daily PRN Carrie Barrera MD      diphenhydrAMINE  25 mg Oral HS Carrie Barrera MD      fenofibrate  145 mg Oral Daily Carrie Barrera MD      gabapentin  600 mg Oral 4x Daily Carrie Barrera MD      haloperidol  1 mg Oral 4x Daily Carrie Barrera MD      haloperidol  5 mg Oral Q6H PRN Carrie Barrera MD      hydrOXYzine HCL  25 mg Oral Q6H PRN Max 4/day Carrie Barrera MD      hydrOXYzine HCL  50 mg Oral Q6H PRN Max 4/day Carrie Barrera MD      lidocaine  1 patch Topical Daily Carrie Barrera MD      LORazepam  1 mg Intramuscular Q6H PRN Carrie Barrera MD      LORazepam  0 5 mg Oral 4x Daily Carrie Barrera MD      magnesium hydroxide  30 mL Oral Daily PRN Carrie Barrera MD      methocarbamol  500 mg Oral Q8H PRN Carrie Barrera MD      nicotine  1 patch Transdermal Daily Carrie Barrera MD      nicotine polacrilex  4 mg Oral Q2H PRN Carrie Barrera MD      OLANZapine  2 5 mg Oral Q8H PRN Carrie Barrera MD      OLANZapine  5 mg Oral Q3H PRN Carrie Barrera MD      OLANZapine  7 5 mg Oral Q3H PRN Carrie Barrera MD      OLANZapine  10 mg Intramuscular Q3H PRN Carrie Barrera MD      OLANZapine  5 mg Intramuscular Q3H PRN Carrie Barrera MD      OLANZapine  10 mg Oral HS Carrie Barrera MD      OLANZapine  15 mg Oral Daily Carrie Barrera MD      paliperidone palmitate ER  234 mg Intramuscular Q30 Days Carrie Barrera MD      pantoprazole  40 mg Oral Early Morning Carrie Barrera MD      Pramox-PE-Glycerin-Petrolatum  1 application Rectal 4x Daily PRN Carrie Barrera MD      prazosin  1 mg Oral HS Carrie Barrera MD      psyllium  1 packet Oral Daily Carrie Barrera MD      sertraline  100 mg Oral Daily Carrie Barrera MD

## 2021-06-25 NOTE — PROGRESS NOTES
06/25/21 1100   Activity/Group Checklist   Group Community meeting   Attendance Attended   Attendance Duration (min) 46-60   Interactions Interacted appropriately   Affect/Mood Appropriate;Calm   Goals Achieved Able to listen to others; Able to engage in interactions; Able to reflect/comment on own behavior;Able to recieve feedback

## 2021-06-25 NOTE — NURSING NOTE
Pearl Segundo has been visible, pleasant, and cooperative  Patient had no outburst or behaviors this shift  Patient social with select peers  Denies all psych symptoms  She is medication and meal compliant   Consumed 100% of dinner  Continue to monitor

## 2021-06-25 NOTE — PROGRESS NOTES
Patient was appropriate in group  When left to talk about her issues she tends to be negative and depressed  Overall she is good and discussed vinny of her coping skills when irritated

## 2021-06-25 NOTE — NURSING NOTE
Patient calm and cooperative  Visible on the unit this morning  Denies SI/HI/AH  Healthy coping skills continued   Med and meal compliant  Will continue to monitor

## 2021-06-26 PROCEDURE — 99232 SBSQ HOSP IP/OBS MODERATE 35: CPT | Performed by: PHYSICIAN ASSISTANT

## 2021-06-26 RX ADMIN — HALOPERIDOL 1 MG: 1 TABLET ORAL at 17:03

## 2021-06-26 RX ADMIN — PHENYTOIN 1 MG: 125 SUSPENSION ORAL at 21:11

## 2021-06-26 RX ADMIN — GLYCERIN, PETROLATUM, PHENYLEPHRINE HCL, PRAMOXINE HCL 1 APPLICATION: 144; 2.5; 10; 15 CREAM TOPICAL at 20:02

## 2021-06-26 RX ADMIN — DICLOFENAC SODIUM 2 G: 10 GEL TOPICAL at 20:08

## 2021-06-26 RX ADMIN — SERTRALINE HYDROCHLORIDE 100 MG: 100 TABLET ORAL at 08:45

## 2021-06-26 RX ADMIN — GABAPENTIN 600 MG: 300 CAPSULE ORAL at 12:13

## 2021-06-26 RX ADMIN — LORAZEPAM 0.5 MG: 0.5 TABLET ORAL at 21:11

## 2021-06-26 RX ADMIN — DIPHENHYDRAMINE HCL 25 MG: 25 TABLET ORAL at 21:11

## 2021-06-26 RX ADMIN — NICOTINE 1 PATCH: 21 PATCH, EXTENDED RELEASE TRANSDERMAL at 09:40

## 2021-06-26 RX ADMIN — PSYLLIUM HUSK 1 PACKET: 3.4 POWDER ORAL at 08:45

## 2021-06-26 RX ADMIN — GABAPENTIN 600 MG: 300 CAPSULE ORAL at 17:04

## 2021-06-26 RX ADMIN — OLANZAPINE 15 MG: 5 TABLET, FILM COATED ORAL at 08:45

## 2021-06-26 RX ADMIN — GABAPENTIN 600 MG: 300 CAPSULE ORAL at 08:45

## 2021-06-26 RX ADMIN — LORAZEPAM 0.5 MG: 0.5 TABLET ORAL at 12:13

## 2021-06-26 RX ADMIN — LORAZEPAM 0.5 MG: 0.5 TABLET ORAL at 08:45

## 2021-06-26 RX ADMIN — FENOFIBRATE 145 MG: 145 TABLET ORAL at 08:45

## 2021-06-26 RX ADMIN — HALOPERIDOL 1 MG: 1 TABLET ORAL at 21:11

## 2021-06-26 RX ADMIN — BENZTROPINE MESYLATE 1 MG: 1 TABLET ORAL at 17:03

## 2021-06-26 RX ADMIN — HALOPERIDOL 1 MG: 1 TABLET ORAL at 12:13

## 2021-06-26 RX ADMIN — GABAPENTIN 600 MG: 300 CAPSULE ORAL at 21:11

## 2021-06-26 RX ADMIN — LORAZEPAM 0.5 MG: 0.5 TABLET ORAL at 17:04

## 2021-06-26 RX ADMIN — BENZTROPINE MESYLATE 1 MG: 1 TABLET ORAL at 08:45

## 2021-06-26 RX ADMIN — PANTOPRAZOLE SODIUM 40 MG: 40 TABLET, DELAYED RELEASE ORAL at 06:03

## 2021-06-26 RX ADMIN — HALOPERIDOL 1 MG: 1 TABLET ORAL at 08:45

## 2021-06-26 RX ADMIN — OLANZAPINE 10 MG: 10 TABLET, FILM COATED ORAL at 21:11

## 2021-06-26 RX ADMIN — LIDOCAINE 1 PATCH: 50 PATCH CUTANEOUS at 09:40

## 2021-06-26 NOTE — PLAN OF CARE
Problem: Alteration in Thoughts and Perception  Goal: Treatment Goal: Gain control of psychotic behaviors/thinking, reduce/eliminate presenting symptoms and demonstrate improved reality functioning upon discharge  Outcome: Progressing  Goal: Refrain from acting on delusional thinking/internal stimuli  Description: Interventions:  - Monitor patient closely, per order   - Utilize least restrictive measures   - Set reasonable limits, give positive feedback for acceptable   - Administer medications as ordered and monitor of potential side effects  Outcome: Progressing  Goal: Agree to be compliant with medication regime, as prescribed and report medication side effects  Description: Interventions:  - Offer appropriate PRN medication and supervise ingestion; conduct AIMS, as needed   Outcome: Progressing     Problem: Ineffective Coping  Goal: Identifies healthy coping skills  Outcome: Progressing  Goal: Demonstrates healthy coping skills  Outcome: Progressing     Problem: Risk for Self Injury/Neglect  Goal: Refrain from harming self  Description: Interventions:  - Monitor patient closely, per order  - Develop a trusting relationship  - Supervise medication ingestion, monitor effects and side effects   Outcome: Progressing     Problem: Depression  Goal: Refrain from isolation  Description: Interventions:  - Develop a trusting relationship   - Encourage socialization   Outcome: Progressing  Goal: Refrain from self-neglect  Outcome: Progressing

## 2021-06-26 NOTE — NURSING NOTE
Patrick Daniels has been visible out and about on the unit throughout the day  No issues or behaviors  Behavior controlled  No yelling out  Denied anxiety and depression  Took medication without an issue  Ate 100% of her meals  Lidoderm patch (lower back) and Nicotine patch (right arm)  applied after her shower this AM  Attended community meeting and AA  Went out on the deck for fresh air  Participated in Art therapy group  Continue to monitor  Precautions maintained

## 2021-06-26 NOTE — NURSING NOTE
Linda Hinojosa had one brief episode of yelling out this afternoon  She came out of Art group and was yelling in the hallway by the nutrition room  "It's just everything that is going on with me"  She got a cup of water and went back to Art group

## 2021-06-26 NOTE — PROGRESS NOTES
Progress Note - Behavioral Health     Dl Miller 49 y o  female MRN: [id number]   Unit/Bed#: [facility] 112-01 Encounter: [id number]    Behavior over the last 24 hours: slowly improving  Kirsty Quintero  Is a 81-year-old female with a history of schizoaffective disorder bipolar type who presents for psychiatric follow-up  Staff reports that she has been visible and pleasant in the milieu  No longer experiencing any yelling outbursts in her room  On approach she is pleasant, calm cooperative and states that she feels "relaxed  "  She does report feeling a bit irritable over the past 24 hours, however, she attributes this to going through her menses and states this is not abnormal for her  Overall, she feels as if she is much improved relative to her previous time in [facility] pass  Specifically, she states the combination of Haldol, Zyprexa and Zoloft have resolved her psychosis and improved her mood to the point where she is now optimistic about her future  Slated to have a group home interview on 07/06/2021      Sleep: normal  Appetite: normal  Medication side effects: No   ROS: all other systems are negative    Mental Status Evaluation:    Appearance:  age appropriate, casually dressed, adequate grooming   Behavior:  pleasant, cooperative, calm   Speech:  normal rate and volume, fluent, clear   Mood:  improved, euthymic   Affect:  normal range and intensity, appropriate   Thought Process:  organized, goal directed, linear   Associations: intact associations   Thought Content:  no overt delusions   Perceptual Disturbances: no auditory hallucinations, no visual hallucinations   Risk Potential: Suicidal ideation - None at present  Homicidal ideation - None at present  Potential for aggression - No   Sensorium:  oriented to person, place and time/date   Memory:  recent and remote memory grossly intact   Consciousness:  alert and awake   Attention/Concentration: attention span and concentration are age appropriate   Insight: improving   Judgment: improving   Gait/Station: normal gait/station, normal balance   Motor Activity: no abnormal movements     Vital signs in last 24 hours:    Temp:  [97 5 °F (36 4 °C)-98 2 °F (36 8 °C)] 98 2 °F (36 8 °C)  HR:  [71-93] 93  Resp:  [17-18] 18  BP: (110-127)/(71-79) 110/78    Laboratory results: I have personally reviewed all pertinent laboratory/tests results    Most Recent Labs:   Lab Results   Component Value Date    WBC 6 80 04/09/2021    RBC 4 32 04/09/2021    HGB 13 0 04/09/2021    HCT 39 5 04/09/2021     04/09/2021    RDW 14 3 04/09/2021    NEUTROABS 7 10 (H) 03/16/2021    SODIUM 139 05/03/2021    K 4 4 05/03/2021     05/03/2021    CO2 30 05/03/2021    BUN 14 05/03/2021    CREATININE 0 61 05/03/2021    GLUC 94 05/03/2021    CALCIUM 9 8 05/03/2021    AST 25 05/03/2021    ALT 18 05/03/2021    ALKPHOS 56 05/03/2021    TP 7 2 05/03/2021    ALB 4 2 05/03/2021    TBILI 0 25 05/03/2021    CHOLESTEROL 300 (H) 03/31/2021    HDL 57 03/31/2021    TRIG 658 (H) 03/31/2021    [address]  03/31/2021      Comment:      Calculated LDL invalid, triglycerides >400 mg/dl    NONHDLC 147 08/23/2020    AMMONIA 28 10/27/2017    TUQ8RBRWUHNE 3 810 04/09/2021    FREET4 0 89 03/24/2016    PREGUR negative 03/16/2021    PREGSERUM Negative 10/21/2019    HCG <2 00 04/10/2014    RPR Non-Reactive 03/31/2021    HGBA1C 5 0 08/06/2019    EAG 97 08/06/2019       Progress Toward Goals: improving, attends groups, participates in milieu therapy, mood is stabilizing, depression is improving, reality testing is improving, working on coping skills, discharge planning    Assessment/Plan   Principal Problem:    Schizoaffective disorder, bipolar type ([address] )  Active Problems:    Post-traumatic stress disorder, chronic    Medical clearance for psychiatric admission    Mild intermittent asthma without complication    Ear problem, bilateral    Gastroesophageal reflux disease    Right foot pain    Ear problem, right      Recommended Treatment:     Planned medication and treatment changes:     All current active medications have been reviewed  Encourage group therapy, milieu therapy and occupational therapy  Behavioral Health checks every 7 minutes  Continue current medications:    Current Facility-Administered Medications   Medication Dose Route Frequency Provider Last Rate    acetaminophen  650 mg Oral Q6H PRN Courtney Guard, MD      acetaminophen  650 mg Oral Q4H PRN Courtney Guard, MD      acetaminophen  975 mg Oral Q6H PRN Courtney Guard, MD      al mag oxide-diphenhydramine-lidocaine viscous  10 mL Swish & Swallow Q4H PRN Courtney Guard, MD      albuterol  2 puff Inhalation Q6H PRN Courtney Guard, MD      aluminum-magnesium hydroxide-simethicone  15 mL Oral Q4H PRN Courtney Guard, MD      benztropine  1 mg Intramuscular Q4H PRN Max 6/day Courtney Guard, MD      benztropine  1 mg Oral Q4H PRN Max 6/day Courtney Guard, MD      benztropine  1 mg Oral BID Courtney Guard, MD      calcium carbonate  500 mg Oral TID PRN Courtney Guard, MD      Diclofenac Sodium  2 g Topical 4x Daily PRN Courtney Guard, MD      diphenhydrAMINE  25 mg Oral HS Courtney Guard, MD      fenofibrate  145 mg Oral Daily Courtney Guard, MD      gabapentin  600 mg Oral 4x Daily Courtney Guard, MD      haloperidol  1 mg Oral 4x Daily Courtney Guard, MD      haloperidol  5 mg Oral Q6H PRN Courtney Guard, MD      hydrOXYzine HCL  25 mg Oral Q6H PRN Max 4/day Courtney Guard, MD      hydrOXYzine HCL  50 mg Oral Q6H PRN Max 4/day Courtney Guard, MD      lidocaine  1 patch Topical Daily Courtney Guard, MD      LORazepam  1 mg Intramuscular Q6H PRN Courtney Guard, MD      LORazepam  0 5 mg Oral 4x Daily Courtney Guard, MD      magnesium hydroxide  30 mL Oral Daily PRN Courtney Guard, MD      methocarbamol  500 mg Oral Q8H PRN Courtney Guard, MD      nicotine  1 patch Transdermal Daily Courtney Guard, MD      nicotine polacrilex  4 mg Oral Q2H PRN Courtney Guard, MD      OLANZapine  2 5 mg Oral Q8H PRN Courtney Guard, MD Amber Negron OLANZapine  5 mg Oral Q3H PRN Chantel Dahl MD      OLANZapine  7 5 mg Oral Q3H PRN Chantel Dahl MD      OLANZapine  10 mg Intramuscular Q3H PRN Chantel Dahl MD      OLANZapine  5 mg Intramuscular Q3H PRN Chantel Dahl MD      OLANZapine  10 mg Oral HS Chantel Dahl MD      OLANZapine  15 mg Oral Daily Chantel Dahl MD      paliperidone palmitate ER  234 mg Intramuscular Q30 Days Chantel Dahl MD      pantoprazole  40 mg Oral Early Morning Chantel Dahl MD      Pramox-PE-Glycerin-Petrolatum  1 application Rectal 4x Daily PRN Chantel Dahl MD      prazosin  1 mg Oral HS Chantel Dahl MD      psyllium  1 packet Oral Daily Chantel Dahl MD      sertraline  100 mg Oral Daily Chantel Dahl MD       Risks / Benefits of Treatment:    Risks, benefits, and possible side effects of medications explained to patient and patient verbalizes understanding and agreement for treatment  Counseling / Coordination of Care:    Patient's progress discussed with staff in treatment team meeting  Medications, treatment progress and treatment plan reviewed with patient      Jessika Rosas PA-C 06/26/21

## 2021-06-27 PROCEDURE — 99232 SBSQ HOSP IP/OBS MODERATE 35: CPT | Performed by: PHYSICIAN ASSISTANT

## 2021-06-27 RX ADMIN — HALOPERIDOL 1 MG: 1 TABLET ORAL at 12:33

## 2021-06-27 RX ADMIN — OLANZAPINE 15 MG: 5 TABLET, FILM COATED ORAL at 08:52

## 2021-06-27 RX ADMIN — NICOTINE 1 PATCH: 21 PATCH, EXTENDED RELEASE TRANSDERMAL at 10:05

## 2021-06-27 RX ADMIN — BENZTROPINE MESYLATE 1 MG: 1 TABLET ORAL at 08:53

## 2021-06-27 RX ADMIN — LORAZEPAM 0.5 MG: 0.5 TABLET ORAL at 12:33

## 2021-06-27 RX ADMIN — LORAZEPAM 0.5 MG: 0.5 TABLET ORAL at 17:03

## 2021-06-27 RX ADMIN — LORAZEPAM 0.5 MG: 0.5 TABLET ORAL at 21:41

## 2021-06-27 RX ADMIN — SERTRALINE HYDROCHLORIDE 100 MG: 100 TABLET ORAL at 08:52

## 2021-06-27 RX ADMIN — PSYLLIUM HUSK 1 PACKET: 3.4 POWDER ORAL at 08:52

## 2021-06-27 RX ADMIN — FENOFIBRATE 145 MG: 145 TABLET ORAL at 08:52

## 2021-06-27 RX ADMIN — BENZTROPINE MESYLATE 1 MG: 1 TABLET ORAL at 17:03

## 2021-06-27 RX ADMIN — GABAPENTIN 600 MG: 300 CAPSULE ORAL at 12:33

## 2021-06-27 RX ADMIN — GABAPENTIN 600 MG: 300 CAPSULE ORAL at 21:41

## 2021-06-27 RX ADMIN — PHENYTOIN 1 MG: 125 SUSPENSION ORAL at 21:40

## 2021-06-27 RX ADMIN — PANTOPRAZOLE SODIUM 40 MG: 40 TABLET, DELAYED RELEASE ORAL at 06:03

## 2021-06-27 RX ADMIN — DIPHENHYDRAMINE HCL 25 MG: 25 TABLET ORAL at 21:40

## 2021-06-27 RX ADMIN — HALOPERIDOL 1 MG: 1 TABLET ORAL at 21:41

## 2021-06-27 RX ADMIN — DICLOFENAC SODIUM 2 G: 10 GEL TOPICAL at 10:40

## 2021-06-27 RX ADMIN — LIDOCAINE 1 PATCH: 50 PATCH CUTANEOUS at 10:05

## 2021-06-27 RX ADMIN — GABAPENTIN 600 MG: 300 CAPSULE ORAL at 08:52

## 2021-06-27 RX ADMIN — GABAPENTIN 600 MG: 300 CAPSULE ORAL at 17:03

## 2021-06-27 RX ADMIN — OLANZAPINE 10 MG: 10 TABLET, FILM COATED ORAL at 21:41

## 2021-06-27 RX ADMIN — LORAZEPAM 0.5 MG: 0.5 TABLET ORAL at 08:52

## 2021-06-27 RX ADMIN — DICLOFENAC SODIUM 2 G: 10 GEL TOPICAL at 17:00

## 2021-06-27 RX ADMIN — HALOPERIDOL 1 MG: 1 TABLET ORAL at 08:52

## 2021-06-27 RX ADMIN — HALOPERIDOL 1 MG: 1 TABLET ORAL at 17:03

## 2021-06-27 NOTE — NURSING NOTE
Susie Redhead has been visible out and about on the unit  She does rest in bed briefly at times  No episodes of yelling out  Ate 100% of her meals  Took all medication without an issue  Attended community meeting and journal groups  Went out on the deck for fresh air  Lidoderm patch to lower back and Nicotine patch to right arm after her shower  Voltaren gel at 1040 per request for 9/10 pain to feet  Continue to monitor  Precautions maintained

## 2021-06-27 NOTE — PLAN OF CARE
Problem: Alteration in Thoughts and Perception  Goal: Agree to be compliant with medication regime, as prescribed and report medication side effects  Description: Interventions:  - Offer appropriate PRN medication and supervise ingestion; conduct AIMS, as needed   Outcome: Progressing     Problem: Ineffective Coping  Goal: Demonstrates healthy coping skills  Outcome: Progressing  Goal: Participates in unit activities  Description: Interventions:  - Provide therapeutic environment   - Provide required programming   - Redirect inappropriate behaviors   Outcome: Progressing

## 2021-06-27 NOTE — NURSING NOTE
Pleasant and cooperative this shift, no outbursts noted  Denies any issues, attended both groups  No somatic complaints offered   Safe precautions maintained

## 2021-06-27 NOTE — PROGRESS NOTES
06/26/21 1300   Activity/Group Checklist   Group Other (Comment)  (OPEN STUDIO Art Therapy/Social Group, Free Expression)   Attendance Attended   Attendance Duration (min) Greater than 60   Interactions Interacted appropriately   Affect/Mood Appropriate   Goals Achieved Able to listen to others; Able to engage in interactions

## 2021-06-27 NOTE — SOCIAL WORK
Patient stated she has few clothing that actually fits her due to her weight gain from medications  She preferred to spend our time together ordering clothing that is comfortable and fits her, which we did together  Pt was pleasant and cooperative throughout, commenting on her progress in the program and looking forward to a discharge  She asked SW again when this will be, and SW explained that her interview July 6th will be the first big step, and hopefully we can find out about bed availability then  Pt receptive, and also open in sharing that she is dealing with much less intrusive thoughts and symptoms, overall feeling better, calmer, and much more at peace with herself outside of the weight gain she gets upset about every now and again

## 2021-06-27 NOTE — PROGRESS NOTES
06/26/21 2248   Team Meeting   Meeting Type Daily Rounds   Initial Conference Date 06/25/21   Patient/Family Present   Patient Present No   Patient's Family Present No       Daily Rounds Documentation  Team Members Participating  MD Melissa Werner, RN  Jerel Tejada, DECLANW  En Tomas, DECLANW  Kareem Clark, CPS    Case reviewed  Episode of yelling witnessed by RN, but brief and controlled  Otherwise no issues, medication compliant, visible and involved in most groups  Awaiting group home interview July 6th with [facility]
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
## 2021-06-27 NOTE — PROGRESS NOTES
Progress Note - Behavioral Health     Dl Miller 49 y o  female MRN: [id number]   Unit/Bed#: [facility] 112-01 Encounter: [id number]    Behavior over the last 24 hours: improving  Kirsty Quintero   Is a 78-year-old female with a history of schizoaffective disorder bipolar type who presents for psychiatric follow-up  Staff reports that she continues to be visible on the unit and appears brighter without any psychotic outbursts  Reportedly went out onto the deck yesterday for some fresh air  Upon approach, the patient is pleasant, calm and cooperative  Feels she is continuing to improve and tolerating her medications well without any ill effects  Remains subjectively irritable but has been journaling and working on using her coping skills, and she feels this is helping  Somewhat upset about remaining hospitalized because she is hopeful for discharge and reportedly has an interview with a group home on July 6  Denies any acute concerns      Sleep: normal  Appetite: normal  Medication side effects: No   ROS: all other systems are negative    Mental Status Evaluation:    Appearance:  age appropriate, casually dressed, adequate grooming   Behavior:  pleasant, cooperative, calm   Speech:  normal rate and volume, fluent, clear, coherent   Mood:  euthymic currently, at times irritable though per patient   Affect:  appropriate, slightly brighter   Thought Process:  organized, goal directed, linear   Associations: intact associations   Thought Content:  no overt delusions, negative thoughts   Perceptual Disturbances: no auditory hallucinations, no visual hallucinations   Risk Potential: Suicidal ideation - None at present  Homicidal ideation - None at present  Potential for aggression - No   Sensorium:  oriented to person, place and time/date   Memory:  recent and remote memory grossly intact   Consciousness:  alert and awake   Attention/Concentration: attention span and concentration are age appropriate   Insight:  improving Judgment: improving   Gait/Station: normal gait/station, normal balance   Motor Activity: no abnormal movements     Vital signs in last 24 hours:    Temp:  [97 4 °F (36 3 °C)-97 5 °F (36 4 °C)] 97 5 °F (36 4 °C)  HR:  [] 98  Resp:  [16-18] 18  BP: (117-130)/(64-75) 127/68    Laboratory results: I have personally reviewed all pertinent laboratory/tests results    Most Recent Labs:   Lab Results   Component Value Date    WBC 6 80 04/09/2021    RBC 4 32 04/09/2021    HGB 13 0 04/09/2021    HCT 39 5 04/09/2021     04/09/2021    RDW 14 3 04/09/2021    NEUTROABS 7 10 (H) 03/16/2021    SODIUM 139 05/03/2021    K 4 4 05/03/2021     05/03/2021    CO2 30 05/03/2021    BUN 14 05/03/2021    CREATININE 0 61 05/03/2021    GLUC 94 05/03/2021    CALCIUM 9 8 05/03/2021    AST 25 05/03/2021    ALT 18 05/03/2021    ALKPHOS 56 05/03/2021    TP 7 2 05/03/2021    ALB 4 2 05/03/2021    TBILI 0 25 05/03/2021    CHOLESTEROL 300 (H) 03/31/2021    HDL 57 03/31/2021    TRIG 658 (H) 03/31/2021    [address]  03/31/2021      Comment:      Calculated LDL invalid, triglycerides >400 mg/dl    NONHDLC 147 08/23/2020    AMMONIA 28 10/27/2017    [id number] 3 810 04/09/2021    FREET4 0 89 03/24/2016    PREGUR negative 03/16/2021    PREGSERUM Negative 10/21/2019    HCG <2 00 04/10/2014    RPR Non-Reactive 03/31/2021    HGBA1C 5 0 08/06/2019    EAG 97 08/06/2019       Progress Toward Goals: improving, attends groups, participates in milieu therapy, mood is stabilizing, depression is improving, working on coping skills, discharge planning    Assessment/Plan   Principal Problem:    Schizoaffective disorder, bipolar type ([facility] [address] )  Active Problems:    Post-traumatic stress disorder, chronic    Medical clearance for psychiatric admission    Mild intermittent asthma without complication    Ear problem, bilateral    Gastroesophageal reflux disease    Right foot pain    Ear problem, right      Recommended Treatment:     Planned medication and treatment changes:     All current active medications have been reviewed  Encourage group therapy, milieu therapy and occupational therapy  Behavioral Health checks every 7 minutes  Continue current medications:    Current Facility-Administered Medications   Medication Dose Route Frequency Provider Last Rate    acetaminophen  650 mg Oral Q6H PRN Geoff Cowan MD      acetaminophen  650 mg Oral Q4H PRN Geoff Cowan MD      acetaminophen  975 mg Oral Q6H PRN Geoff Cowan MD      al mag oxide-diphenhydramine-lidocaine viscous  10 mL Swish & Swallow Q4H PRN Geoff Cowan MD      albuterol  2 puff Inhalation Q6H PRN Geoff Cowan MD      aluminum-magnesium hydroxide-simethicone  15 mL Oral Q4H PRN Geoff Cowan MD      benztropine  1 mg Intramuscular Q4H PRN Max 6/day Geoff Cowan MD      benztropine  1 mg Oral Q4H PRN Max 6/day Geoff Cowan MD      benztropine  1 mg Oral BID Geoff Cowan MD      calcium carbonate  500 mg Oral TID PRN Geoff Cowan MD      Diclofenac Sodium  2 g Topical 4x Daily PRN Geoff Cowan MD      diphenhydrAMINE  25 mg Oral HS Geoff Cowan MD      fenofibrate  145 mg Oral Daily Geoff Cowan MD      gabapentin  600 mg Oral 4x Daily Geoff Cowan MD      haloperidol  1 mg Oral 4x Daily Geoff Cowan MD      haloperidol  5 mg Oral Q6H PRN Geoff Cowan MD      hydrOXYzine HCL  25 mg Oral Q6H PRN Max 4/day Geoff Cowan MD      hydrOXYzine HCL  50 mg Oral Q6H PRN Max 4/day Geoff Cowan MD      lidocaine  1 patch Topical Daily Geoff Cowan MD      LORazepam  1 mg Intramuscular Q6H PRN Geoff Cowan MD      LORazepam  0 5 mg Oral 4x Daily Geoff Cowan MD      magnesium hydroxide  30 mL Oral Daily PRN Geoff Cowan MD      methocarbamol  500 mg Oral Q8H PRN Geoff Cowan MD      nicotine  1 patch Transdermal Daily Geoff Cowan MD      nicotine polacrilex  4 mg Oral Q2H PRN Geoff Cowan MD      OLANZapine  2 5 mg Oral Q8H PRN Geoff Cowan MD      OLANZapine  5 mg Oral Q3H PRN Geoff Cowan MD  OLANZapine  7 5 mg Oral Q3H PRN Mariel Philip MD      OLANZapine  10 mg Intramuscular Q3H PRN Mariel Philip MD      OLANZapine  5 mg Intramuscular Q3H PRN Mariel Philip MD      OLANZapine  10 mg Oral HS Mariel Philip MD      OLANZapine  15 mg Oral Daily Mariel Philip MD      paliperidone palmitate ER  234 mg Intramuscular Q30 Days Mariel Philip MD      pantoprazole  40 mg Oral Early Morning Mariel Philip MD      Pramox-PE-Glycerin-Petrolatum  1 application Rectal 4x Daily PRN Mariel Philip MD      prazosin  1 mg Oral HS Mariel Philip MD      psyllium  1 packet Oral Daily Mariel Philip MD      sertraline  100 mg Oral Daily Mariel Philip MD       Risks / Benefits of Treatment:    Risks, benefits, and possible side effects of medications explained to patient and patient verbalizes understanding and agreement for treatment  Counseling / Coordination of Care:    Patient's progress discussed with staff in treatment team meeting  Medications, treatment progress and treatment plan reviewed with patient      Alber Elaine PA-C 06/27/21

## 2021-06-28 PROCEDURE — 99232 SBSQ HOSP IP/OBS MODERATE 35: CPT | Performed by: PSYCHIATRY & NEUROLOGY

## 2021-06-28 RX ADMIN — LORAZEPAM 0.5 MG: 0.5 TABLET ORAL at 08:38

## 2021-06-28 RX ADMIN — LORAZEPAM 0.5 MG: 0.5 TABLET ORAL at 17:00

## 2021-06-28 RX ADMIN — GLYCERIN, PETROLATUM, PHENYLEPHRINE HCL, PRAMOXINE HCL 1 APPLICATION: 144; 2.5; 10; 15 CREAM TOPICAL at 16:56

## 2021-06-28 RX ADMIN — PANTOPRAZOLE SODIUM 40 MG: 40 TABLET, DELAYED RELEASE ORAL at 06:18

## 2021-06-28 RX ADMIN — DICLOFENAC SODIUM 2 G: 10 GEL TOPICAL at 16:56

## 2021-06-28 RX ADMIN — SERTRALINE HYDROCHLORIDE 100 MG: 100 TABLET ORAL at 08:38

## 2021-06-28 RX ADMIN — HALOPERIDOL 1 MG: 1 TABLET ORAL at 08:37

## 2021-06-28 RX ADMIN — DIPHENHYDRAMINE HCL 25 MG: 25 TABLET ORAL at 21:29

## 2021-06-28 RX ADMIN — BENZTROPINE MESYLATE 1 MG: 1 TABLET ORAL at 17:00

## 2021-06-28 RX ADMIN — METHOCARBAMOL TABLETS 500 MG: 500 TABLET, COATED ORAL at 21:29

## 2021-06-28 RX ADMIN — HALOPERIDOL 1 MG: 1 TABLET ORAL at 17:00

## 2021-06-28 RX ADMIN — PSYLLIUM HUSK 1 PACKET: 3.4 POWDER ORAL at 08:37

## 2021-06-28 RX ADMIN — GABAPENTIN 600 MG: 300 CAPSULE ORAL at 08:37

## 2021-06-28 RX ADMIN — METHOCARBAMOL TABLETS 500 MG: 500 TABLET, COATED ORAL at 15:41

## 2021-06-28 RX ADMIN — PHENYTOIN 1 MG: 125 SUSPENSION ORAL at 21:30

## 2021-06-28 RX ADMIN — HALOPERIDOL 1 MG: 1 TABLET ORAL at 21:29

## 2021-06-28 RX ADMIN — GABAPENTIN 600 MG: 300 CAPSULE ORAL at 21:29

## 2021-06-28 RX ADMIN — LIDOCAINE 1 PATCH: 50 PATCH CUTANEOUS at 09:34

## 2021-06-28 RX ADMIN — LORAZEPAM 0.5 MG: 0.5 TABLET ORAL at 21:30

## 2021-06-28 RX ADMIN — BENZTROPINE MESYLATE 1 MG: 1 TABLET ORAL at 08:38

## 2021-06-28 RX ADMIN — NICOTINE 1 PATCH: 21 PATCH, EXTENDED RELEASE TRANSDERMAL at 09:34

## 2021-06-28 RX ADMIN — HALOPERIDOL 1 MG: 1 TABLET ORAL at 13:46

## 2021-06-28 RX ADMIN — OLANZAPINE 10 MG: 10 TABLET, FILM COATED ORAL at 21:29

## 2021-06-28 RX ADMIN — GABAPENTIN 600 MG: 300 CAPSULE ORAL at 13:46

## 2021-06-28 RX ADMIN — OLANZAPINE 15 MG: 5 TABLET, FILM COATED ORAL at 08:38

## 2021-06-28 RX ADMIN — FENOFIBRATE 145 MG: 145 TABLET ORAL at 08:38

## 2021-06-28 RX ADMIN — GABAPENTIN 600 MG: 300 CAPSULE ORAL at 17:00

## 2021-06-28 RX ADMIN — LORAZEPAM 0.5 MG: 0.5 TABLET ORAL at 13:45

## 2021-06-28 NOTE — NURSING NOTE
Elida, cooperative, visible, worked on self assessment sheet for treatment team during wrap up group, compliant with routine medications, will continue to monitor
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
## 2021-06-28 NOTE — PLAN OF CARE
Problem: Nutrition/Hydration-ADULT  Goal: Nutrient/Hydration intake appropriate for improving, restoring or maintaining nutritional needs  Description: Monitor and assess patient's nutrition/hydration status for malnutrition  Collaborate with interdisciplinary team and initiate plan and interventions as ordered  Monitor patient's weight and dietary intake as ordered or per policy  Utilize nutrition screening tool and intervene as necessary  Determine patient's food preferences and provide high-protein, high-caloric foods as appropriate       INTERVENTIONS:  - Monitor oral intake, urinary output, labs, and treatment plans  - Assess nutrition and hydration status and recommend course of action  - Evaluate amount of meals eaten  - Assist patient with eating if necessary   - Allow adequate time for meals  - Recommend/ encourage appropriate diets, oral nutritional supplements, and vitamin/mineral supplements  - Order, calculate, and assess calorie counts as needed  - Recommend, monitor, and adjust tube feedings and TPN/PPN based on assessed needs  - Assess need for intravenous fluids  - Provide specific nutrition/hydration education as appropriate  - Include patient/family/caregiver in decisions related to nutrition  Outcome: Progressing     Problem: Alteration in Thoughts and Perception  Goal: Treatment Goal: Gain control of psychotic behaviors/thinking, reduce/eliminate presenting symptoms and demonstrate improved reality functioning upon discharge  Outcome: Progressing  Goal: Verbalize thoughts and feelings  Description: Interventions:  - Promote a nonjudgmental and trusting relationship with the patient through active listening and therapeutic communication  - Assess patient's level of functioning, behavior and potential for risk  - Engage patient in 1 on 1 interactions  - Encourage patient to express fears, feelings, frustrations, and discuss symptoms    - [place] patient to reality, help patient recognize reality-based thinking   - Administer medications as ordered and assess for potential side effects  - Provide the patient education related to the signs and symptoms of the illness and desired effects of prescribed medications  Outcome: Progressing  Goal: Refrain from acting on delusional thinking/internal stimuli  Description: Interventions:  - Monitor patient closely, per order   - Utilize least restrictive measures   - Set reasonable limits, give positive feedback for acceptable   - Administer medications as ordered and monitor of potential side effects  Outcome: Progressing  Goal: Agree to be compliant with medication regime, as prescribed and report medication side effects  Description: Interventions:  - Offer appropriate PRN medication and supervise ingestion; conduct AIMS, as needed   Outcome: Progressing  Goal: Attend and participate in unit activities, including therapeutic, recreational, and educational groups  Description: Interventions:  -Encourage Visitation and family involvement in care  Outcome: Progressing  Goal: Recognize dysfunctional thoughts, communicate reality-based thoughts at the time of discharge  Description: Interventions:  - Provide medication and psycho-education to assist patient in compliance and developing insight into his/her illness   Outcome: Progressing  Goal: Complete daily ADLs, including personal hygiene independently, as able  Description: Interventions:  - Observe, teach, and assist patient with ADLS  - Monitor and promote a balance of rest/activity, with adequate nutrition and elimination   Outcome: Progressing     Problem: Ineffective Coping  Goal: Cooperates with admission process  Description: Interventions:   - Complete admission process  Outcome: Progressing  Goal: Identifies ineffective coping skills  Outcome: Progressing  Goal: Identifies healthy coping skills  Outcome: Progressing  Goal: Demonstrates healthy coping skills  Outcome: Progressing  Goal: Participates in unit activities  Description: Interventions:  - Provide therapeutic environment   - Provide required programming   - Redirect inappropriate behaviors   Outcome: Progressing  Goal: Patient/Family participate in treatment and DC plans  Description: Interventions:  - Provide therapeutic environment  Outcome: Progressing  Goal: Patient/Family verbalizes awareness of resources  Outcome: Progressing  Goal: Understands least restrictive measures  Description: Interventions:  - Utilize least restrictive behavior  Outcome: Progressing  Goal: Free from restraint events  Description: - Utilize least restrictive measures   - Provide behavioral interventions   - Redirect inappropriate behaviors   Outcome: Progressing     Problem: Risk for Self Injury/Neglect  Goal: Treatment Goal: Remain safe during length of stay, learn and adopt new coping skills, and be free of self-injurious ideation, impulses and acts at the time of discharge  Outcome: Progressing  Goal: Verbalize thoughts and feelings  Description: Interventions:  - Assess and re-assess patient's lethality and potential for self-injury  - Engage patient in 1:1 interactions, daily, for a minimum of 15 minutes  - Encourage patient to express feelings, fears, frustrations, hopes  - Establish rapport/trust with patient   Outcome: Progressing  Goal: Refrain from harming self  Description: Interventions:  - Monitor patient closely, per order  - Develop a trusting relationship  - Supervise medication ingestion, monitor effects and side effects   Outcome: Progressing  Goal: Attend and participate in unit activities, including therapeutic, recreational, and educational groups  Description: Interventions:  - Provide therapeutic and educational activities daily, encourage attendance and participation, and document same in the medical record  - Obtain collateral information, encourage visitation and family involvement in care   Outcome: Progressing  Goal: Recognize maladaptive responses and adopt new coping mechanisms  Outcome: Progressing  Goal: Complete daily ADLs, including personal hygiene independently, as able  Description: Interventions:  - Observe, teach, and assist patient with ADLS  - Monitor and promote a balance of rest/activity, with adequate nutrition and elimination  Outcome: Progressing     Problem: Depression  Goal: Treatment Goal: Demonstrate behavioral control of depressive symptoms, verbalize feelings of improved mood/affect, and adopt new coping skills prior to discharge  Outcome: Progressing  Goal: Verbalize thoughts and feelings  Description: Interventions:  - Assess and re-assess patient's level of risk   - Engage patient in 1:1 interactions, daily, for a minimum of 15 minutes   - Encourage patient to express feelings, fears, frustrations, hopes   Outcome: Progressing  Goal: Refrain from harming self  Description: Interventions:  - Monitor patient closely, per order   - Supervise medication ingestion, monitor effects and side effects   Outcome: Progressing  Goal: Refrain from isolation  Description: Interventions:  - Develop a trusting relationship   - Encourage socialization   Outcome: Progressing  Goal: Refrain from self-neglect  Outcome: Progressing  Goal: Attend and participate in unit activities, including therapeutic, recreational, and educational groups  Description: Interventions:  - Provide therapeutic and educational activities daily, encourage attendance and participation, and document same in the medical record   Outcome: Progressing  Goal: Complete daily ADLs, including personal hygiene independently, as able  Description: Interventions:  - Observe, teach, and assist patient with ADLS  -  Monitor and promote a balance of rest/activity, with adequate nutrition and elimination   Outcome: Progressing     Problem: Anxiety  Goal: Anxiety is at manageable level  Description: Interventions:  - Assess and monitor patient's anxiety level     - Monitor for signs and symptoms (heart palpitations, chest pain, shortness of breath, headaches, nausea, feeling jumpy, restlessness, irritable, apprehensive)  - Collaborate with interdisciplinary team and initiate plan and interventions as ordered    - [place] patient to unit/surroundings  - Explain treatment plan  - Encourage participation in care  - Encourage verbalization of concerns/fears  - Identify coping mechanisms  - Assist in developing anxiety-reducing skills  - Administer/offer alternative therapies  - Limit or eliminate stimulants  Outcome: Progressing     Problem: Risk for Violence/Aggression Toward Others  Goal: Treatment Goal: Refrain from acts of violence/aggression during length of stay, and demonstrate improved impulse control at the time of discharge  Outcome: Progressing  Goal: Verbalize thoughts and feelings  Description: Interventions:  - Assess and re-assess patient's level of risk, every waking shift  - Engage patient in 1:1 interactions, daily, for a minimum of 15 minutes   - Allow patient to express feelings and frustrations in a safe and non-threatening manner   - Establish rapport/trust with patient   Outcome: Progressing  Goal: Refrain from harming others  Outcome: Progressing  Goal: Refrain from destructive acts on the environment or property  Outcome: Progressing  Goal: Control angry outbursts  Description: Interventions:  - Monitor patient closely, per order  - Ensure early verbal de-escalation  - Monitor prn medication needs  - Set reasonable/therapeutic limits, outline behavioral expectations, and consequences   - Provide a non-threatening milieu, utilizing the least restrictive interventions   Outcome: Progressing  Goal: Attend and participate in unit activities, including therapeutic, recreational, and educational groups  Description: Interventions:  - Provide therapeutic and educational activities daily, encourage attendance and participation, and document same in the medical record   Outcome: Progressing  Goal: Identify appropriate positive anger management techniques  Description: Interventions:  - Offer anger management and coping skills groups   - Staff will provide positive feedback for appropriate anger control  Outcome: Progressing     Problem: Alteration in Orientation  Goal: Treatment Goal: Demonstrate a reduction of confusion and improved orientation to person, place, time and/or situation upon discharge, according to optimum baseline/ability  Outcome: Progressing  Goal: Interact with staff daily  Description: Interventions:  - Assess and re-assess patient's level of orientation  - Engage patient in 1 on 1 interactions, daily, for a minimum of 15 minutes   - Establish rapport/trust with patient   Outcome: Progressing  Goal: Express concerns related to confused thinking related to:  Description: Interventions:  - Encourage patient to express feelings, fears, frustrations, hopes  - Assign consistent caregivers   - [place]/re-orient patient as needed  - Allow comfort items, as appropriate  - Provide visual cues, signs, etc    Outcome: Progressing  Goal: Allow medical examinations, as recommended  Description: Interventions:  - Provide physical/neurological exams and/or referrals, per provider   Outcome: Progressing  Goal: Cooperate with recommended testing/procedures  Description: Interventions:  - Determine need for ancillary testing  - Observe for mental status changes  - Implement falls/precaution protocol   Outcome: Progressing  Goal: Attend and participate in unit activities, including therapeutic, recreational, and educational groups  Description: Interventions:  - Provide therapeutic and educational activities daily, encourage attendance and participation, and document same in the medical record   - Provide appropriate opportunities for reminiscence   - Provide a consistent daily routine   - Encourage family contact/visitation   Outcome: Progressing  Goal: Complete daily ADLs, including personal hygiene independently, as able  Description: Interventions:  - Observe, teach, and assist patient with ADLS  Outcome: Progressing     Problem: Individualized Interventions  Goal: Patient will verbalize appropriate use of telephone within 5 days  Description: Interventions:  - Treatment team to determine use of supervised phone privileges   Outcome: Progressing  Goal: Patient will verbalize need for hospitalization and will no longer attempt elopement within 5 days  Description: Interventions:  - Ongoing education to help patient understand need for hospitalization  Outcome: Progressing  Goal: Patient will recognize inappropriate behaviors and develop alternative behaviors within 5 days  Description: Interventions:  - Patient in collaboration with Treatment Team will develop a behavior management plan to help identify effective coping skills to deal with stressors  Outcome: Progressing     Problem: DISCHARGE PLANNING - CARE MANAGEMENT  Goal: Discharge to post-acute care or home with appropriate resources  Description: INTERVENTIONS:  - Conduct assessment to determine patient/family and health care team treatment goals, and need for post-acute services based on payer coverage, community resources, and patient preferences, and barriers to discharge  - Address psychosocial, clinical, and financial barriers to discharge as identified in assessment in conjunction with the patient/family and health care team  - Arrange appropriate level of post-acute services according to patients   needs and preference and payer coverage in collaboration with the physician and health care team  - Communicate with and update the patient/family, physician, and health care team regarding progress on the discharge plan  - Arrange appropriate transportation to post-acute venues  Outcome: Progressing

## 2021-06-28 NOTE — NURSING NOTE
Pt is medication and meal compliant  Pt is visible in the milieu sitting with peers and selectively socializing  Pt otherwise keeps to self and rests in bed  Pt had no outbursts noted this shift and has remained in control of behaviors  Will continue to monitor
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
## 2021-06-29 PROCEDURE — 99232 SBSQ HOSP IP/OBS MODERATE 35: CPT | Performed by: PSYCHIATRY & NEUROLOGY

## 2021-06-29 RX ADMIN — LORAZEPAM 0.5 MG: 0.5 TABLET ORAL at 08:46

## 2021-06-29 RX ADMIN — HALOPERIDOL 1 MG: 1 TABLET ORAL at 21:05

## 2021-06-29 RX ADMIN — LORAZEPAM 0.5 MG: 0.5 TABLET ORAL at 17:02

## 2021-06-29 RX ADMIN — BENZTROPINE MESYLATE 1 MG: 1 TABLET ORAL at 17:02

## 2021-06-29 RX ADMIN — HALOPERIDOL 1 MG: 1 TABLET ORAL at 12:09

## 2021-06-29 RX ADMIN — OLANZAPINE 10 MG: 10 TABLET, FILM COATED ORAL at 21:05

## 2021-06-29 RX ADMIN — PSYLLIUM HUSK 1 PACKET: 3.4 POWDER ORAL at 08:45

## 2021-06-29 RX ADMIN — LORAZEPAM 0.5 MG: 0.5 TABLET ORAL at 12:09

## 2021-06-29 RX ADMIN — DICLOFENAC SODIUM 2 G: 10 GEL TOPICAL at 21:30

## 2021-06-29 RX ADMIN — BENZTROPINE MESYLATE 1 MG: 1 TABLET ORAL at 08:46

## 2021-06-29 RX ADMIN — GABAPENTIN 600 MG: 300 CAPSULE ORAL at 21:05

## 2021-06-29 RX ADMIN — OLANZAPINE 15 MG: 5 TABLET, FILM COATED ORAL at 08:46

## 2021-06-29 RX ADMIN — NICOTINE 1 PATCH: 21 PATCH, EXTENDED RELEASE TRANSDERMAL at 08:46

## 2021-06-29 RX ADMIN — METHOCARBAMOL TABLETS 500 MG: 500 TABLET, COATED ORAL at 12:14

## 2021-06-29 RX ADMIN — LORAZEPAM 0.5 MG: 0.5 TABLET ORAL at 21:05

## 2021-06-29 RX ADMIN — HALOPERIDOL 1 MG: 1 TABLET ORAL at 17:02

## 2021-06-29 RX ADMIN — SERTRALINE HYDROCHLORIDE 100 MG: 100 TABLET ORAL at 08:47

## 2021-06-29 RX ADMIN — HALOPERIDOL 1 MG: 1 TABLET ORAL at 08:46

## 2021-06-29 RX ADMIN — GABAPENTIN 600 MG: 300 CAPSULE ORAL at 12:09

## 2021-06-29 RX ADMIN — GABAPENTIN 600 MG: 300 CAPSULE ORAL at 17:02

## 2021-06-29 RX ADMIN — GABAPENTIN 600 MG: 300 CAPSULE ORAL at 08:46

## 2021-06-29 RX ADMIN — PANTOPRAZOLE SODIUM 40 MG: 40 TABLET, DELAYED RELEASE ORAL at 06:26

## 2021-06-29 RX ADMIN — LIDOCAINE 1 PATCH: 50 PATCH CUTANEOUS at 08:45

## 2021-06-29 RX ADMIN — DIPHENHYDRAMINE HCL 25 MG: 25 TABLET ORAL at 21:05

## 2021-06-29 RX ADMIN — PHENYTOIN 1 MG: 125 SUSPENSION ORAL at 21:05

## 2021-06-29 RX ADMIN — DICLOFENAC SODIUM 2 G: 10 GEL TOPICAL at 09:55

## 2021-06-29 RX ADMIN — FENOFIBRATE 145 MG: 145 TABLET ORAL at 08:46
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
## 2021-06-29 NOTE — PLAN OF CARE
Problem: Alteration in Thoughts and Perception  Goal: Treatment Goal: Gain control of psychotic behaviors/thinking, reduce/eliminate presenting symptoms and demonstrate improved reality functioning upon discharge  Outcome: Progressing  Goal: Verbalize thoughts and feelings  Description: Interventions:  - Promote a nonjudgmental and trusting relationship with the patient through active listening and therapeutic communication  - Assess patient's level of functioning, behavior and potential for risk  - Engage patient in 1 on 1 interactions  - Encourage patient to express fears, feelings, frustrations, and discuss symptoms    - [place] patient to reality, help patient recognize reality-based thinking   - Administer medications as ordered and assess for potential side effects  - Provide the patient education related to the signs and symptoms of the illness and desired effects of prescribed medications  Outcome: Progressing  Goal: Refrain from acting on delusional thinking/internal stimuli  Description: Interventions:  - Monitor patient closely, per order   - Utilize least restrictive measures   - Set reasonable limits, give positive feedback for acceptable   - Administer medications as ordered and monitor of potential side effects  Outcome: Not Progressing  Goal: Agree to be compliant with medication regime, as prescribed and report medication side effects  Description: Interventions:  - Offer appropriate PRN medication and supervise ingestion; conduct AIMS, as needed   Outcome: Progressing  Goal: Attend and participate in unit activities, including therapeutic, recreational, and educational groups  Description: Interventions:  -Encourage Visitation and family involvement in care  Outcome: Progressing  Goal: Recognize dysfunctional thoughts, communicate reality-based thoughts at the time of discharge  Description: Interventions:  - Provide medication and psycho-education to assist patient in compliance and developing insight into his/her illness   Outcome: Progressing  Goal: Complete daily ADLs, including personal hygiene independently, as able  Description: Interventions:  - Observe, teach, and assist patient with ADLS  - Monitor and promote a balance of rest/activity, with adequate nutrition and elimination   Outcome: Progressing     Problem: Ineffective Coping  Goal: Cooperates with admission process  Description: Interventions:   - Complete admission process  Outcome: Progressing  Goal: Identifies ineffective coping skills  Outcome: Progressing  Goal: Identifies healthy coping skills  Outcome: Progressing  Goal: Demonstrates healthy coping skills  Outcome: Progressing  Goal: Participates in unit activities  Description: Interventions:  - Provide therapeutic environment   - Provide required programming   - Redirect inappropriate behaviors   Outcome: Progressing  Goal: Patient/Family participate in treatment and DC plans  Description: Interventions:  - Provide therapeutic environment  Outcome: Progressing  Goal: Patient/Family verbalizes awareness of resources  Outcome: Progressing  Goal: Understands least restrictive measures  Description: Interventions:  - Utilize least restrictive behavior  Outcome: Progressing  Goal: Free from restraint events  Description: - Utilize least restrictive measures   - Provide behavioral interventions   - Redirect inappropriate behaviors   Outcome: Progressing     Problem: Risk for Self Injury/Neglect  Goal: Treatment Goal: Remain safe during length of stay, learn and adopt new coping skills, and be free of self-injurious ideation, impulses and acts at the time of discharge  Outcome: Progressing  Goal: Verbalize thoughts and feelings  Description: Interventions:  - Assess and re-assess patient's lethality and potential for self-injury  - Engage patient in 1:1 interactions, daily, for a minimum of 15 minutes  - Encourage patient to express feelings, fears, frustrations, hopes  - Establish rapport/trust with patient   Outcome: Progressing  Goal: Refrain from harming self  Description: Interventions:  - Monitor patient closely, per order  - Develop a trusting relationship  - Supervise medication ingestion, monitor effects and side effects   Outcome: Progressing  Goal: Attend and participate in unit activities, including therapeutic, recreational, and educational groups  Description: Interventions:  - Provide therapeutic and educational activities daily, encourage attendance and participation, and document same in the medical record  - Obtain collateral information, encourage visitation and family involvement in care   Outcome: Progressing  Goal: Recognize maladaptive responses and adopt new coping mechanisms  Outcome: Progressing  Goal: Complete daily ADLs, including personal hygiene independently, as able  Description: Interventions:  - Observe, teach, and assist patient with ADLS  - Monitor and promote a balance of rest/activity, with adequate nutrition and elimination  Outcome: Progressing     Problem: Depression  Goal: Treatment Goal: Demonstrate behavioral control of depressive symptoms, verbalize feelings of improved mood/affect, and adopt new coping skills prior to discharge  Outcome: Progressing  Goal: Verbalize thoughts and feelings  Description: Interventions:  - Assess and re-assess patient's level of risk   - Engage patient in 1:1 interactions, daily, for a minimum of 15 minutes   - Encourage patient to express feelings, fears, frustrations, hopes   Outcome: Progressing  Goal: Refrain from harming self  Description: Interventions:  - Monitor patient closely, per order   - Supervise medication ingestion, monitor effects and side effects   Outcome: Progressing  Goal: Refrain from isolation  Description: Interventions:  - Develop a trusting relationship   - Encourage socialization   Outcome: Progressing  Goal: Refrain from self-neglect  Outcome: Progressing  Goal: Attend and participate in unit activities, including therapeutic, recreational, and educational groups  Description: Interventions:  - Provide therapeutic and educational activities daily, encourage attendance and participation, and document same in the medical record   Outcome: Progressing  Goal: Complete daily ADLs, including personal hygiene independently, as able  Description: Interventions:  - Observe, teach, and assist patient with ADLS  -  Monitor and promote a balance of rest/activity, with adequate nutrition and elimination   Outcome: Progressing     Problem: Anxiety  Goal: Anxiety is at manageable level  Description: Interventions:  - Assess and monitor patient's anxiety level  - Monitor for signs and symptoms (heart palpitations, chest pain, shortness of breath, headaches, nausea, feeling jumpy, restlessness, irritable, apprehensive)  - Collaborate with interdisciplinary team and initiate plan and interventions as ordered    - [place] patient to unit/surroundings  - Explain treatment plan  - Encourage participation in care  - Encourage verbalization of concerns/fears  - Identify coping mechanisms  - Assist in developing anxiety-reducing skills  - Administer/offer alternative therapies  - Limit or eliminate stimulants  Outcome: Progressing     Problem: Risk for Violence/Aggression Toward Others  Goal: Treatment Goal: Refrain from acts of violence/aggression during length of stay, and demonstrate improved impulse control at the time of discharge  Outcome: Progressing  Goal: Verbalize thoughts and feelings  Description: Interventions:  - Assess and re-assess patient's level of risk, every waking shift  - Engage patient in 1:1 interactions, daily, for a minimum of 15 minutes   - Allow patient to express feelings and frustrations in a safe and non-threatening manner   - Establish rapport/trust with patient   Outcome: Progressing  Goal: Refrain from harming others  Outcome: Progressing  Goal: Refrain from destructive acts on the environment or property  Outcome: Progressing  Goal: Control angry outbursts  Description: Interventions:  - Monitor patient closely, per order  - Ensure early verbal de-escalation  - Monitor prn medication needs  - Set reasonable/therapeutic limits, outline behavioral expectations, and consequences   - Provide a non-threatening milieu, utilizing the least restrictive interventions   Outcome: Progressing  Goal: Attend and participate in unit activities, including therapeutic, recreational, and educational groups  Description: Interventions:  - Provide therapeutic and educational activities daily, encourage attendance and participation, and document same in the medical record   Outcome: Progressing  Goal: Identify appropriate positive anger management techniques  Description: Interventions:  - Offer anger management and coping skills groups   - Staff will provide positive feedback for appropriate anger control  Outcome: Progressing     Problem: Alteration in Orientation  Goal: Treatment Goal: Demonstrate a reduction of confusion and improved orientation to person, place, time and/or situation upon discharge, according to optimum baseline/ability  Outcome: Progressing  Goal: Interact with staff daily  Description: Interventions:  - Assess and re-assess patient's level of orientation  - Engage patient in 1 on 1 interactions, daily, for a minimum of 15 minutes   - Establish rapport/trust with patient   Outcome: Progressing  Goal: Express concerns related to confused thinking related to:  Description: Interventions:  - Encourage patient to express feelings, fears, frustrations, hopes  - Assign consistent caregivers   - [place]/re-orient patient as needed  - Allow comfort items, as appropriate  - Provide visual cues, signs, etc    Outcome: Progressing  Goal: Allow medical examinations, as recommended  Description: Interventions:  - Provide physical/neurological exams and/or referrals, per provider   Outcome: Progressing  Goal: Cooperate with recommended testing/procedures  Description: Interventions:  - Determine need for ancillary testing  - Observe for mental status changes  - Implement falls/precaution protocol   Outcome: Progressing  Goal: Attend and participate in unit activities, including therapeutic, recreational, and educational groups  Description: Interventions:  - Provide therapeutic and educational activities daily, encourage attendance and participation, and document same in the medical record   - Provide appropriate opportunities for reminiscence   - Provide a consistent daily routine   - Encourage family contact/visitation   Outcome: Progressing  Goal: Complete daily ADLs, including personal hygiene independently, as able  Description: Interventions:  - Observe, teach, and assist patient with ADLS  Outcome: Progressing     Problem: Individualized Interventions  Goal: Patient will verbalize appropriate use of telephone within 5 days  Description: Interventions:  - Treatment team to determine use of supervised phone privileges   Outcome: Progressing  Goal: Patient will verbalize need for hospitalization and will no longer attempt elopement within 5 days  Description: Interventions:  - Ongoing education to help patient understand need for hospitalization  Outcome: Progressing  Goal: Patient will recognize inappropriate behaviors and develop alternative behaviors within 5 days  Description: Interventions:  - Patient in collaboration with Treatment Team will develop a behavior management plan to help identify effective coping skills to deal with stressors  Outcome: Progressing

## 2021-06-29 NOTE — PROGRESS NOTES
Patient did participate only when called  She appears to be more in her thoughts during group  She had difficulty forming her words, thought blocking and shut down   She appeared depressed, but did not want to discuss

## 2021-06-29 NOTE — PROGRESS NOTES
06/29/21 1306   Team Meeting   Meeting Type Tx Team Meeting   Initial Conference Date 06/29/21   Next Conference Date 07/06/21   Team Members Present   Team Members Present Physician;;Nurse; Other (Discipline and Name)   Physician Team Member Dr Juan Miguel Lloyd MD   Nursing Team Member Saleem Gilliam RN   Social Work Team Member Jasmin Weber LSW   Other (Discipline and Name) Niesha Mckeon, Mabel Bolivar ([facility]), Nini Blanton CPS   Patient/Family Present   Patient Present Yes   Patient's Family Present No       Summary: Patient presented for her treatment team meeting this morning with a completed self assessment  Pt is pleasant, less anxious and cooperative at this time  Pt shared from her assessment and reported her main barrier has been dealing with distraction, though she did not elaborate  Her coping skills have been journaling, praying, meditating, and staying busy, as well as going for walks  Pt identified her smart goal as doing more meditation and prayer, but did not feel accomplished with this due to her being distracted  Pt new goal is to "get ready for my interview" (with teresita)  Pt denied missed medications, reported a complete list of current medications, denied reactions, reported medical issue of pain in ankles in feet (which she had reported to her nurse)  Pt practices self care by having good hygiene and being social  She is engaged in groups and has maintained 72% or above as average while in the program     Discussed also entailed patient asking about going to OP OBGYN and bank in preparation for her discharge  Pt has had successful OP visits already to dentist and eye doctor  [facility] will follow up to facilitate  Pt will have interview with Teresita on 7/6 for [facility]  She plans to meet with SETH prior to prepare for this

## 2021-06-29 NOTE — NURSING NOTE
6-27   Spoke w/pt about scheduling          6-28  Scheduled procedure with Patient at      Telephone Information:   Mobile [phone]      Scheduled Via: [facility]   Case ID: [id number] Patient name: Gricelda Salas [[id number]]   Date: 7/15/2019 Status: Scheduled   730am / 930am     Patient prefers  facility rather than the doctor's preferred facility  Procedure date: 7-15  Procedure time: 930am  Fr[place] explained:   Rep Contacted?:   Entered into MD's [place]?   Insurance confirmed as wea, will be the same at time of procedure?: Yes  Insurance Accepted at Facility? Yes    The following have been confirmed:  Latex Allergy No  Diabetic No  Sleep Apnea No  Diuretic/Water pill No  Defibrillator/Pacemaker No  MRSA hx No  Blood thinners: Coumadin (Warfarin) or Plavix No      Aspirin No      Phentermine (diet pill) No  Pre-Op testing required Yes, Patient informed Yes  Primary care preop  Prep required? npo; Briefly reviewed? Yes; Prep cost range discussed? No  If procedure is scheduled 7 days or less, patient was told to  prep letter?: n/a     Pt is medication and meal compliant  Pt is pleasant in conversation and denies s/s currently  Pt did have an episode of yelling out in room " I hate you, leave me alone!" and slamming door shut and yelling out once more  Pt was able to use coping skills without needing a PRN  Pt keeps to self and uses pt phone at times  Will continue to monitor

## 2021-06-29 NOTE — NURSING NOTE
Patient is calm and visible  She is pleasant and cooperative   She states she is focusing on her coping skills so she can be discharged  Volteran Gel for bilateral foot pain at 2130  At [facility] she said "Can you tell the MD that I still hear my old boyfriend upstairs?"

## 2021-06-30 PROCEDURE — 99232 SBSQ HOSP IP/OBS MODERATE 35: CPT | Performed by: PSYCHIATRY & NEUROLOGY

## 2021-06-30 RX ADMIN — PHENYTOIN 1 MG: 125 SUSPENSION ORAL at 21:27

## 2021-06-30 RX ADMIN — HALOPERIDOL 1 MG: 1 TABLET ORAL at 12:28

## 2021-06-30 RX ADMIN — NICOTINE 1 PATCH: 21 PATCH, EXTENDED RELEASE TRANSDERMAL at 08:32

## 2021-06-30 RX ADMIN — PANTOPRAZOLE SODIUM 40 MG: 40 TABLET, DELAYED RELEASE ORAL at 06:18

## 2021-06-30 RX ADMIN — OLANZAPINE 10 MG: 10 TABLET, FILM COATED ORAL at 21:27

## 2021-06-30 RX ADMIN — BENZTROPINE MESYLATE 1 MG: 1 TABLET ORAL at 17:14

## 2021-06-30 RX ADMIN — SERTRALINE HYDROCHLORIDE 100 MG: 100 TABLET ORAL at 08:31

## 2021-06-30 RX ADMIN — PSYLLIUM HUSK 1 PACKET: 3.4 POWDER ORAL at 08:30

## 2021-06-30 RX ADMIN — FENOFIBRATE 145 MG: 145 TABLET ORAL at 08:32

## 2021-06-30 RX ADMIN — LORAZEPAM 0.5 MG: 0.5 TABLET ORAL at 17:14

## 2021-06-30 RX ADMIN — DIPHENHYDRAMINE HCL 25 MG: 25 TABLET ORAL at 21:28

## 2021-06-30 RX ADMIN — GABAPENTIN 600 MG: 300 CAPSULE ORAL at 08:31

## 2021-06-30 RX ADMIN — LORAZEPAM 0.5 MG: 0.5 TABLET ORAL at 12:28

## 2021-06-30 RX ADMIN — GABAPENTIN 600 MG: 300 CAPSULE ORAL at 21:28

## 2021-06-30 RX ADMIN — GABAPENTIN 600 MG: 300 CAPSULE ORAL at 17:14

## 2021-06-30 RX ADMIN — LORAZEPAM 0.5 MG: 0.5 TABLET ORAL at 08:31

## 2021-06-30 RX ADMIN — GABAPENTIN 600 MG: 300 CAPSULE ORAL at 12:28

## 2021-06-30 RX ADMIN — LIDOCAINE 1 PATCH: 50 PATCH CUTANEOUS at 08:30

## 2021-06-30 RX ADMIN — OLANZAPINE 15 MG: 5 TABLET, FILM COATED ORAL at 08:31

## 2021-06-30 RX ADMIN — HALOPERIDOL 1 MG: 1 TABLET ORAL at 17:14

## 2021-06-30 RX ADMIN — LORAZEPAM 0.5 MG: 0.5 TABLET ORAL at 21:27

## 2021-06-30 RX ADMIN — BENZTROPINE MESYLATE 1 MG: 1 TABLET ORAL at 08:31

## 2021-06-30 RX ADMIN — HALOPERIDOL 1 MG: 1 TABLET ORAL at 21:27

## 2021-06-30 RX ADMIN — HALOPERIDOL 1 MG: 1 TABLET ORAL at 08:31

## 2021-06-30 NOTE — NURSING NOTE
Pt came out for journaling group and began yelling in dining around other peers  Pt exited room a few minutes after this and continued stating " its just not right, you have to stop this" and returned to room slamming door with no further yelling  Will continue to monitor

## 2021-06-30 NOTE — NURSING NOTE
Pt is medication and meal compliant  Pt has irritable edge and has had episode of yelling out in room and slamming door  Upon entering room with morning medication pt was yelling out while laying in bed with eyes closed stating " just go away already, this isnt right, dont insult me!"  Approached pt after yelling out subsided, pt accepted medication, but increased tone of voice afterward speaking out while looking at the floor saying " dont insult my intelligence like that, its not right"  Questioned pt if hearing voices at which time pt looked at [facility] stating " dont ask me that, ask someone with a higher degree, dont insult me like that"  Pt refused to converse further and returned to bed

## 2021-06-30 NOTE — PLAN OF CARE
Problem: Alteration in Thoughts and Perception  Goal: Treatment Goal: Gain control of psychotic behaviors/thinking, reduce/eliminate presenting symptoms and demonstrate improved reality functioning upon discharge  Outcome: Progressing  Goal: Verbalize thoughts and feelings  Description: Interventions:  - Promote a nonjudgmental and trusting relationship with the patient through active listening and therapeutic communication  - Assess patient's level of functioning, behavior and potential for risk  - Engage patient in 1 on 1 interactions  - Encourage patient to express fears, feelings, frustrations, and discuss symptoms    - [place] patient to reality, help patient recognize reality-based thinking   - Administer medications as ordered and assess for potential side effects  - Provide the patient education related to the signs and symptoms of the illness and desired effects of prescribed medications  Outcome: Progressing  Goal: Refrain from acting on delusional thinking/internal stimuli  Description: Interventions:  - Monitor patient closely, per order   - Utilize least restrictive measures   - Set reasonable limits, give positive feedback for acceptable   - Administer medications as ordered and monitor of potential side effects  Outcome: Progressing  Goal: Agree to be compliant with medication regime, as prescribed and report medication side effects  Description: Interventions:  - Offer appropriate PRN medication and supervise ingestion; conduct AIMS, as needed   Outcome: Progressing  Goal: Attend and participate in unit activities, including therapeutic, recreational, and educational groups  Description: Interventions:  -Encourage Visitation and family involvement in care  Outcome: Progressing  Goal: Recognize dysfunctional thoughts, communicate reality-based thoughts at the time of discharge  Description: Interventions:  - Provide medication and psycho-education to assist patient in compliance and developing insight into his/her illness   Outcome: Progressing  Goal: Complete daily ADLs, including personal hygiene independently, as able  Description: Interventions:  - Observe, teach, and assist patient with ADLS  - Monitor and promote a balance of rest/activity, with adequate nutrition and elimination   Outcome: Progressing     Problem: Ineffective Coping  Goal: Cooperates with admission process  Description: Interventions:   - Complete admission process  Outcome: Progressing  Goal: Identifies ineffective coping skills  Outcome: Progressing  Goal: Identifies healthy coping skills  Outcome: Progressing  Goal: Demonstrates healthy coping skills  Outcome: Progressing  Goal: Participates in unit activities  Description: Interventions:  - Provide therapeutic environment   - Provide required programming   - Redirect inappropriate behaviors   Outcome: Progressing  Goal: Patient/Family participate in treatment and DC plans  Description: Interventions:  - Provide therapeutic environment  Outcome: Progressing  Goal: Patient/Family verbalizes awareness of resources  Outcome: Progressing  Goal: Understands least restrictive measures  Description: Interventions:  - Utilize least restrictive behavior  Outcome: Progressing  Goal: Free from restraint events  Description: - Utilize least restrictive measures   - Provide behavioral interventions   - Redirect inappropriate behaviors   Outcome: Progressing     Problem: Risk for Self Injury/Neglect  Goal: Treatment Goal: Remain safe during length of stay, learn and adopt new coping skills, and be free of self-injurious ideation, impulses and acts at the time of discharge  Outcome: Progressing  Goal: Verbalize thoughts and feelings  Description: Interventions:  - Assess and re-assess patient's lethality and potential for self-injury  - Engage patient in 1:1 interactions, daily, for a minimum of 15 minutes  - Encourage patient to express feelings, fears, frustrations, hopes  - Establish rapport/trust with patient   Outcome: Progressing  Goal: Refrain from harming self  Description: Interventions:  - Monitor patient closely, per order  - Develop a trusting relationship  - Supervise medication ingestion, monitor effects and side effects   Outcome: Progressing  Goal: Attend and participate in unit activities, including therapeutic, recreational, and educational groups  Description: Interventions:  - Provide therapeutic and educational activities daily, encourage attendance and participation, and document same in the medical record  - Obtain collateral information, encourage visitation and family involvement in care   Outcome: Progressing  Goal: Recognize maladaptive responses and adopt new coping mechanisms  Outcome: Not Progressing  Goal: Complete daily ADLs, including personal hygiene independently, as able  Description: Interventions:  - Observe, teach, and assist patient with ADLS  - Monitor and promote a balance of rest/activity, with adequate nutrition and elimination  Outcome: Progressing     Problem: Depression  Goal: Treatment Goal: Demonstrate behavioral control of depressive symptoms, verbalize feelings of improved mood/affect, and adopt new coping skills prior to discharge  Outcome: Progressing  Goal: Verbalize thoughts and feelings  Description: Interventions:  - Assess and re-assess patient's level of risk   - Engage patient in 1:1 interactions, daily, for a minimum of 15 minutes   - Encourage patient to express feelings, fears, frustrations, hopes   Outcome: Progressing  Goal: Refrain from harming self  Description: Interventions:  - Monitor patient closely, per order   - Supervise medication ingestion, monitor effects and side effects   Outcome: Progressing  Goal: Refrain from isolation  Description: Interventions:  - Develop a trusting relationship   - Encourage socialization   Outcome: Progressing  Goal: Refrain from self-neglect  Outcome: Progressing  Goal: Attend and participate in unit activities, including therapeutic, recreational, and educational groups  Description: Interventions:  - Provide therapeutic and educational activities daily, encourage attendance and participation, and document same in the medical record   Outcome: Progressing  Goal: Complete daily ADLs, including personal hygiene independently, as able  Description: Interventions:  - Observe, teach, and assist patient with ADLS  -  Monitor and promote a balance of rest/activity, with adequate nutrition and elimination   Outcome: Progressing     Problem: Anxiety  Goal: Anxiety is at manageable level  Description: Interventions:  - Assess and monitor patient's anxiety level  - Monitor for signs and symptoms (heart palpitations, chest pain, shortness of breath, headaches, nausea, feeling jumpy, restlessness, irritable, apprehensive)  - Collaborate with interdisciplinary team and initiate plan and interventions as ordered    - [place] patient to unit/surroundings  - Explain treatment plan  - Encourage participation in care  - Encourage verbalization of concerns/fears  - Identify coping mechanisms  - Assist in developing anxiety-reducing skills  - Administer/offer alternative therapies  - Limit or eliminate stimulants  Outcome: Progressing     Problem: Risk for Violence/Aggression Toward Others  Goal: Treatment Goal: Refrain from acts of violence/aggression during length of stay, and demonstrate improved impulse control at the time of discharge  Outcome: Progressing  Goal: Verbalize thoughts and feelings  Description: Interventions:  - Assess and re-assess patient's level of risk, every waking shift  - Engage patient in 1:1 interactions, daily, for a minimum of 15 minutes   - Allow patient to express feelings and frustrations in a safe and non-threatening manner   - Establish rapport/trust with patient   Outcome: Progressing  Goal: Refrain from harming others  Outcome: Progressing  Goal: Refrain from destructive acts on the environment or property  Outcome: Progressing  Goal: Control angry outbursts  Description: Interventions:  - Monitor patient closely, per order  - Ensure early verbal de-escalation  - Monitor prn medication needs  - Set reasonable/therapeutic limits, outline behavioral expectations, and consequences   - Provide a non-threatening milieu, utilizing the least restrictive interventions   Outcome: Progressing  Goal: Attend and participate in unit activities, including therapeutic, recreational, and educational groups  Description: Interventions:  - Provide therapeutic and educational activities daily, encourage attendance and participation, and document same in the medical record   Outcome: Progressing  Goal: Identify appropriate positive anger management techniques  Description: Interventions:  - Offer anger management and coping skills groups   - Staff will provide positive feedback for appropriate anger control  Outcome: Progressing     Problem: Alteration in Orientation  Goal: Treatment Goal: Demonstrate a reduction of confusion and improved orientation to person, place, time and/or situation upon discharge, according to optimum baseline/ability  Outcome: Progressing  Goal: Interact with staff daily  Description: Interventions:  - Assess and re-assess patient's level of orientation  - Engage patient in 1 on 1 interactions, daily, for a minimum of 15 minutes   - Establish rapport/trust with patient   Outcome: Progressing  Goal: Express concerns related to confused thinking related to:  Description: Interventions:  - Encourage patient to express feelings, fears, frustrations, hopes  - Assign consistent caregivers   - [place]/re-orient patient as needed  - Allow comfort items, as appropriate  - Provide visual cues, signs, etc    Outcome: Progressing  Goal: Allow medical examinations, as recommended  Description: Interventions:  - Provide physical/neurological exams and/or referrals, per provider   Outcome: Progressing  Goal: Cooperate with recommended testing/procedures  Description: Interventions:  - Determine need for ancillary testing  - Observe for mental status changes  - Implement falls/precaution protocol   Outcome: Progressing  Goal: Attend and participate in unit activities, including therapeutic, recreational, and educational groups  Description: Interventions:  - Provide therapeutic and educational activities daily, encourage attendance and participation, and document same in the medical record   - Provide appropriate opportunities for reminiscence   - Provide a consistent daily routine   - Encourage family contact/visitation   Outcome: Progressing  Goal: Complete daily ADLs, including personal hygiene independently, as able  Description: Interventions:  - Observe, teach, and assist patient with ADLS  Outcome: Progressing     Problem: Individualized Interventions  Goal: Patient will verbalize appropriate use of telephone within 5 days  Description: Interventions:  - Treatment team to determine use of supervised phone privileges   Outcome: Progressing  Goal: Patient will verbalize need for hospitalization and will no longer attempt elopement within 5 days  Description: Interventions:  - Ongoing education to help patient understand need for hospitalization  Outcome: Progressing  Goal: Patient will recognize inappropriate behaviors and develop alternative behaviors within 5 days  Description: Interventions:  - Patient in collaboration with Treatment Team will develop a behavior management plan to help identify effective coping skills to deal with stressors  Outcome: Progressing

## 2021-06-30 NOTE — PROGRESS NOTES
Progress Note - Behavioral Health   Laureen Huber 49 y o  female MRN: [id number]  Unit/Bed#: [facility] 112-01 Encounter: [id number]    Assessment/Plan   Principal Problem:    Schizoaffective disorder, bipolar type ([address] )  Active Problems:    Post-traumatic stress disorder, chronic    Medical clearance for psychiatric admission    Mild intermittent asthma without complication    Ear problem, bilateral    Gastroesophageal reflux disease    Right foot pain    Ear problem, right      Behavior over the last 24 hours:  regressed  Sleep: normal  Appetite: normal  Medication side effects: No  ROS: patient is upset today but will not talk about what is causing her to fell this way     Mental Status Evaluation:  Appearance:  age appropriate and casually dressed   Behavior:  evasive and guarded   Speech:  normal pitch and normal volume   Mood:  irritable and labile   Affect:  labile   Thought Process:  circumstantial   Thought Content:  delusions  persecutory   Perceptual Disturbances: Auditory hallucinations without commands and Visual hallucinations   Risk Potential: Suicidal Ideations none  Homicidal Ideations none  Potential for Aggression No   Sensorium:  person and place   Memory:  recent and remote memory grossly intact   Consciousness:  alert and awake    Attention: attention span appeared shorter than expected for age   Insight:  limited   Judgment: limited   Gait/Station: normal gait/station and normal balance   Motor Activity: no abnormal movements     Progress Toward Goals: Sabrina Mcmanus remains compliant with her medications  She has been irritable and upset today and will talk about was causing her to feel this way  Will continue to monitor  Recommended Treatment: Continue with group therapy, milieu therapy and occupational therapy  Risks, benefits and possible side effects of Medications:   Risks, benefits, and possible side effects of medications explained to patient and patient verbalizes understanding  Medications:   all current active meds have been reviewed, continue current psychiatric medications and current meds:   Current Facility-Administered Medications   Medication Dose Route Frequency    acetaminophen (TYLENOL) tablet 650 mg  650 mg Oral Q6H PRN    acetaminophen (TYLENOL) tablet 650 mg  650 mg Oral Q4H PRN    acetaminophen (TYLENOL) tablet 975 mg  975 mg Oral Q6H PRN    al mag oxide-diphenhydramine-lidocaine viscous (MAGIC MOUTHWASH) suspension 10 mL  10 mL Swish & Swallow Q4H PRN    albuterol (PROVENTIL HFA,VENTOLIN HFA) inhaler 2 puff  2 puff Inhalation Q6H PRN    aluminum-magnesium hydroxide-simethicone (MYLANTA) oral suspension 15 mL  15 mL Oral Q4H PRN    benztropine (COGENTIN) injection 1 mg  1 mg Intramuscular Q4H PRN Max 6/day    benztropine (COGENTIN) tablet 1 mg  1 mg Oral Q4H PRN Max 6/day    benztropine (COGENTIN) tablet 1 mg  1 mg Oral BID    calcium carbonate (TUMS) chewable tablet 500 mg  500 mg Oral TID PRN    Diclofenac Sodium (VOLTAREN) 1 % topical gel 2 g  2 g Topical 4x Daily PRN    diphenhydrAMINE (BENADRYL) tablet 25 mg  25 mg Oral HS    fenofibrate (TRICOR) tablet 145 mg  145 mg Oral Daily    gabapentin (NEURONTIN) capsule 600 mg  600 mg Oral 4x Daily    haloperidol (HALDOL) tablet 1 mg  1 mg Oral 4x Daily    haloperidol (HALDOL) tablet 5 mg  5 mg Oral Q6H PRN    hydrOXYzine HCL (ATARAX) tablet 25 mg  25 mg Oral Q6H PRN Max 4/day    hydrOXYzine HCL (ATARAX) tablet 50 mg  50 mg Oral Q6H PRN Max 4/day    lidocaine (LIDODERM) 5 % patch 1 patch  1 patch Topical Daily    LORazepam (ATIVAN) injection 1 mg  1 mg Intramuscular Q6H PRN    LORazepam (ATIVAN) tablet 0 5 mg  0 5 mg Oral 4x Daily    magnesium hydroxide (MILK OF MAGNESIA) oral suspension 30 mL  30 mL Oral Daily PRN    methocarbamol (ROBAXIN) tablet 500 mg  500 mg Oral Q8H PRN    nicotine (NICODERM CQ) 21 mg/24 hr TD 24 hr patch 1 patch  1 patch Transdermal Daily    nicotine polacrilex (NICORETTE) gum 4 mg  4 mg Oral Q2H PRN    OLANZapine (ZyPREXA ZYDIS) dispersible tablet 2 5 mg  2 5 mg Oral Q8H PRN    OLANZapine (ZyPREXA ZYDIS) dispersible tablet 5 mg  5 mg Oral Q3H PRN    OLANZapine (ZyPREXA ZYDIS) dispersible tablet 7 5 mg  7 5 mg Oral Q3H PRN    OLANZapine (ZyPREXA) IM injection 10 mg  10 mg Intramuscular Q3H PRN    OLANZapine (ZyPREXA) IM injection 5 mg  5 mg Intramuscular Q3H PRN    OLANZapine (ZyPREXA) tablet 10 mg  10 mg Oral HS    OLANZapine (ZyPREXA) tablet 15 mg  15 mg Oral Daily    paliperidone palmitate ER (INVEGA) IM injection 234 mg  234 mg Intramuscular Q30 Days    pantoprazole (PROTONIX) EC tablet 40 mg  40 mg Oral Early Morning    Pramox-PE-Glycerin-Petrolatum (PREPARATION H MAX) 1-0 25-14 4-15 % rectal cream 1 application  1 application Rectal 4x Daily PRN    prazosin (MINIPRESS) capsule 1 mg  1 mg Oral HS    psyllium (METAMUCIL) 1 packet  1 packet Oral Daily    sertraline (ZOLOFT) tablet 100 mg  100 mg Oral Daily     Labs: I have personally reviewed all pertinent laboratory/tests results     Most Recent Labs:   Lab Results   Component Value Date    WBC 6 80 04/09/2021    RBC 4 32 04/09/2021    HGB 13 0 04/09/2021    HCT 39 5 04/09/2021     04/09/2021    RDW 14 3 04/09/2021    NEUTROABS 7 10 (H) 03/16/2021    SODIUM 139 05/03/2021    K 4 4 05/03/2021     05/03/2021    CO2 30 05/03/2021    BUN 14 05/03/2021    CREATININE 0 61 05/03/2021    GLUC 94 05/03/2021    GLUF 94 05/03/2021    CALCIUM 9 8 05/03/2021    AST 25 05/03/2021    ALT 18 05/03/2021    ALKPHOS 56 05/03/2021    TP 7 2 05/03/2021    ALB 4 2 05/03/2021    TBILI 0 25 05/03/2021    CHOLESTEROL 300 (H) 03/31/2021    HDL 57 03/31/2021    TRIG 658 (H) 03/31/2021    LDLCALC  03/31/2021      Comment:      Calculated LDL invalid, triglycerides >400 mg/dl    NONHDLC 147 08/23/2020    AMMONIA 28 10/27/2017    [id number] 3 810 04/09/2021    FREET4 0 89 03/24/2016    PREGSERUM Negative 10/21/2019    HCG <2 00 04/10/2014    RPR Non-Reactive 03/31/2021    HGBA1C 5 0 08/06/2019    EAG 97 08/06/2019       Counseling / Coordination of Care  Total floor / unit time spent today n/a minutes  Greater than 50% of total time was spent with the patient and / or family counseling and / or coordination of care   A description of the counseling / coordination of care:

## 2021-06-30 NOTE — NURSING NOTE
Patient was visible intermittently  She was a bit irritable when OOB and seemed distracted but did not have any outbursts; no episode of yelling or screaming   She was in bed more than usual this evening but had no complaints  She was compliant with medication and unit routine

## 2021-06-30 NOTE — PLAN OF CARE
Problem: Alteration in Thoughts and Perception  Goal: Verbalize thoughts and feelings  Description: Interventions:  - Promote a nonjudgmental and trusting relationship with the patient through active listening and therapeutic communication  - Assess patient's level of functioning, behavior and potential for risk  - Engage patient in 1 on 1 interactions  - Encourage patient to express fears, feelings, frustrations, and discuss symptoms    - [place] patient to reality, help patient recognize reality-based thinking   - Administer medications as ordered and assess for potential side effects  - Provide the patient education related to the signs and symptoms of the illness and desired effects of prescribed medications  Outcome: Progressing     Problem: Ineffective Coping  Goal: Identifies healthy coping skills  Outcome: Progressing  Goal: Participates in unit activities  Description: Interventions:  - Provide therapeutic environment   - Provide required programming   - Redirect inappropriate behaviors   Outcome: Progressing    Writer met with Patient face to face and completed an updated  Relapse Prevention plan along with reviewing Life Domain goals  Patient stated her early warning signs are isolation which increases to days with no communication with anyone, stop eating, stops taking care of herself and increased alcohol triggers due to resorting back to people/places/things  Patient identified walking, exercising, listening to music and sitting alone to calm down as coping skills that help her  Patient listed her community support persons as her daughter Tim Reyes, best friend Mane Cheryl and community support staff from group home and ACT Team  Patient is aware she should contact community Doctor and/or support staff if she has medication issues/concerns, extreme sudheer, increased voices and/or suicidal ideations      Concerning Patient's Life Domain goals, writer supplied Patient with applications for [place] [employer] to accommodate her transportation needs and a [employer] application as part of her community reintegration prep  Patient will fill out as much as she can on her own, Madan Womack will assist and Patient will follow up with ACT Team when she is discharged

## 2021-07-01 PROCEDURE — 99233 SBSQ HOSP IP/OBS HIGH 50: CPT | Performed by: PSYCHIATRY & NEUROLOGY

## 2021-07-01 RX ADMIN — HALOPERIDOL 1 MG: 1 TABLET ORAL at 08:34

## 2021-07-01 RX ADMIN — LORAZEPAM 0.5 MG: 0.5 TABLET ORAL at 17:35

## 2021-07-01 RX ADMIN — PSYLLIUM HUSK 1 PACKET: 3.4 POWDER ORAL at 08:32

## 2021-07-01 RX ADMIN — DICLOFENAC SODIUM 2 G: 10 GEL TOPICAL at 17:37

## 2021-07-01 RX ADMIN — HALOPERIDOL 1 MG: 1 TABLET ORAL at 12:16

## 2021-07-01 RX ADMIN — GABAPENTIN 600 MG: 300 CAPSULE ORAL at 22:44

## 2021-07-01 RX ADMIN — OLANZAPINE 10 MG: 10 TABLET, FILM COATED ORAL at 22:44

## 2021-07-01 RX ADMIN — BENZTROPINE MESYLATE 1 MG: 1 TABLET ORAL at 17:36

## 2021-07-01 RX ADMIN — GABAPENTIN 600 MG: 300 CAPSULE ORAL at 08:36

## 2021-07-01 RX ADMIN — LORAZEPAM 0.5 MG: 0.5 TABLET ORAL at 22:45

## 2021-07-01 RX ADMIN — LIDOCAINE 1 PATCH: 50 PATCH CUTANEOUS at 08:32

## 2021-07-01 RX ADMIN — PANTOPRAZOLE SODIUM 40 MG: 40 TABLET, DELAYED RELEASE ORAL at 06:03

## 2021-07-01 RX ADMIN — HALOPERIDOL 1 MG: 1 TABLET ORAL at 17:36

## 2021-07-01 RX ADMIN — GLYCERIN, PETROLATUM, PHENYLEPHRINE HCL, PRAMOXINE HCL 1 APPLICATION: 144; 2.5; 10; 15 CREAM TOPICAL at 17:36

## 2021-07-01 RX ADMIN — DIPHENHYDRAMINE HCL 25 MG: 25 TABLET ORAL at 22:44

## 2021-07-01 RX ADMIN — LORAZEPAM 0.5 MG: 0.5 TABLET ORAL at 12:16

## 2021-07-01 RX ADMIN — GABAPENTIN 600 MG: 300 CAPSULE ORAL at 17:35

## 2021-07-01 RX ADMIN — GABAPENTIN 600 MG: 300 CAPSULE ORAL at 12:16

## 2021-07-01 RX ADMIN — BENZTROPINE MESYLATE 1 MG: 1 TABLET ORAL at 08:33

## 2021-07-01 RX ADMIN — SERTRALINE HYDROCHLORIDE 100 MG: 100 TABLET ORAL at 08:34

## 2021-07-01 RX ADMIN — PHENYTOIN 1 MG: 125 SUSPENSION ORAL at 22:45

## 2021-07-01 RX ADMIN — NICOTINE 1 PATCH: 21 PATCH, EXTENDED RELEASE TRANSDERMAL at 08:36

## 2021-07-01 RX ADMIN — LORAZEPAM 0.5 MG: 0.5 TABLET ORAL at 08:35

## 2021-07-01 RX ADMIN — HALOPERIDOL 1 MG: 1 TABLET ORAL at 22:47

## 2021-07-01 RX ADMIN — OLANZAPINE 15 MG: 5 TABLET, FILM COATED ORAL at 08:34

## 2021-07-01 RX ADMIN — FENOFIBRATE 145 MG: 145 TABLET ORAL at 08:34

## 2021-07-01 NOTE — PROGRESS NOTES
07/01/21 1150   Team Meeting   Meeting Type Daily Rounds   Initial Conference Date 07/01/21   Patient/Family Present   Patient Present No   Patient's Family Present No       Daily Rounds Documentation  Team Members Participating  MD Layla Melendez, YUDY  Mány, LSW  Amarilis Farooq, LSW  Maria A Amaya, CPS    Case reviewed  Yelling out more frequently yesterday  No groups  Described her symptoms to SW during session yesterday, stated she felt "psychotic" and like she is going "insane" again  Bizarre comments, receptive to therapy  Worried she may jeopardize her discharge plan if she regresses  Has interview next Tuesday with [facility]

## 2021-07-01 NOTE — NURSING NOTE
Patient pleasant and cooperative,visible on the unit and some peer interaction noted  At [phone] patient requested and given Voltaren gel for her feet and Preparation H for anal itching

## 2021-07-01 NOTE — NURSING NOTE
Alert, and visible intermittently  No SI or HI noted  Denies depression, anxiety and pain  Pt this am wanted phones on  MHT explained to pt that group is in session and she could use the phone after group  Pt begins yelling and stating she is being held here as a prisoner and paces to room and slams the door  Pt emanueltes self  Writer explained to pt the expectation of the unit and pt request writer to leave her alone  Pt currently calm and walking around unit at this time  Consumed of 100% of breakfast and 100% of lunch  Took all medication without prompting  Maintained on safe precautions without incident   Will continue to monitor progress and recovery program

## 2021-07-01 NOTE — PROGRESS NOTES
Psychiatry Progress Note Paulie Hill [age] y o  female MRN: [id number]  Unit/Bed#: [facility] 112-01 Encounter: [id number]  Code Status: Level 1 - Full Code    PCP: Cain Hernandez DO    Date of Admission:  4/7/2021 1435   Date of Service:  07/01/21    Patient Active Problem List   Diagnosis    Schizoaffective disorder, bipolar type ([facility] )    Uncomplicated alcohol dependence ([facility] )    Suicidal behavior    LYNN (generalized anxiety disorder)    Slow transit constipation    Deficiency of vitamin B    Degenerative disc disease, lumbar    Post-traumatic stress disorder, chronic    Lower extremity weakness    Generalized abdominal pain    Non-intractable vomiting    Medical clearance for psychiatric admission    Carbuncle    Alcohol dependence with unspecified alcohol-induced disorder ([facility] )    Mild intermittent asthma without complication    Tobacco abuse    Ear problem, bilateral    Gastroesophageal reflux disease    Right foot pain    Ear problem, right     Review of systems:     unremarkable  Diagnosis:            Schizoaffective bipolar, alcohol use disorder in early remission in controlled environment  Assessment   Overall Status:         Was  yelling all day yesterday and talking to herself becoming delusional but redirect usually friendly pleasant and polite when approached   Certification Statement: The patient will continue to require additional inpatient hospital stay due to ongoing mood swings paranoia   Acceptance by patient:  accepting    Hopefulness in recovery:  Living in a group home   Understanding of medications: has good understanding   Involved in reintegration process:  Adjusting to the unit   Trusting in relationship with psychiatrist:  trusting   Justification for dual anti-psychotics: due to lack of response to single antipsychotics   Side effects from treatment: none    Medication changes   None  today  Medication Education;  Risks and S/E and precautions of all meds given to patient and she did verbalize an understanding   Non-pharmacological treatments   Continue with individual, group, milieu and occupational therapy using recovery principles and psycho-education about accepting illness and the need for treatment  Safety   Safety and communication plan established to target dynamic risk factors discussed above  Discharge Plan     most likely to a group home with the act team again     Interval Progress     Patient still tends to have  episodes of yelling or so when but has not needed p r n  medications for the same  Still delusional about people have need icing her and seeing people on the  Ceiling in her  Still talks about receiving messages from TV and people living upstairs stalking her and talking to her through the vents on the singing  Still believes that people are hiypnotizing her but not always talks about it    She is usually friendly pleasant polite attending groups and is hopeful about moving into a group home and  discharge  in the near future     Sleep:                          good   appetite:                      good   compliance with medications :      complying   Side  Effects:                                 None reported   significant events:                         Occasional episodes of yelling and still delusional but manageable   group attendance:                        None today    Mental Status Exam  Appearance: age appropriate, casually dressed, looks older than stated age, underweight   Was agitated accusing staff of taking too many freedom away from her but did come down when approached  Behavior: normal, pleasant, cooperative, appears anxious, mildly anxious,  more friendly today and not confrontational     Speech: fluent, clear, coherent, increased rate, pressured, hypertalkative,   Circumstantial pressured    Mood: depressed, anxious, euphoric  Affect: labile, reactive, slightly brighter    Thought Process: normal abstract reasoning, less prominent, tends to be pressured and circumstantial and preoccupied perseverating on discharge    Thought Content: some paranoia, ideas of reference, but does appear as if paranoid  No current suicidal homicidal thoughts intent or plans verbalized  No phobias obsessions compulsions or distorted body perceptions elicited  Also believes TV is sending her messages from movie stars checking her if she is good or bad and threatening to get the feds to go after her  Still believes the staff maybe him not icing her   Perceptual Disturbances: no auditory hallucinations, no visual hallucinations, denies auditory hallucinations when asked, does not appear responding to internal stimuli, no voices reported today     No vices today   Hx Risk Factors: chronic mood disorder, chronic psychotic symptoms, alcohol use, limted insight  Sensorium:         Oriented to situation and to 3 spheres  Cognition: recent and remote memory grossly intact  Consciousness: alert and awake  Attention: attention span and concentration are age appropriate  Intellect: appears to be of average intelligence  Insight: improving  Judgement: improving  Motor Activity: no abnormal movements     Vitals  Temp:  [97 1 °F (36 2 °C)-97 3 °F (36 3 °C)] 97 3 °F (36 3 °C)  HR:  [76-90] 86  Resp:  [17-18] 17  BP: (107-124)/(60-76) 112/60  No intake or output data in the 24 hours ending 07/01/21 0600    Lab Results:  [facility] Admission Reviewed     Current Facility-Administered Medications   Medication Dose Route Frequency Provider Last Rate    acetaminophen  650 mg Oral Q6H PRN Veronica Cartwright MD      acetaminophen  650 mg Oral Q4H PRN Veronica Cartwright MD      acetaminophen  975 mg Oral Q6H PRN Veronica Cartwright MD      al mag oxide-diphenhydramine-lidocaine viscous  10 mL Swish & Swallow Q4H PRN Veronica Cartwright MD      albuterol  2 puff Inhalation Q6H PRN Veronica Cartwright MD      aluminum-magnesium hydroxide-simethicone 15 mL Oral Q4H PRN Ana Marte MD      benztropine  1 mg Intramuscular Q4H PRN Max 6/day Ana Marte MD      benztropine  1 mg Oral Q4H PRN Max 6/day Ana Marte MD      benztropine  1 mg Oral BID Ana Marte MD      calcium carbonate  500 mg Oral TID PRN Ana Marte MD      Diclofenac Sodium  2 g Topical 4x Daily PRN Ana Marte MD      diphenhydrAMINE  25 mg Oral HS Ana Marte MD      fenofibrate  145 mg Oral Daily Ana Marte MD      gabapentin  600 mg Oral 4x Daily Ana Marte MD      haloperidol  1 mg Oral 4x Daily Ana Marte MD      haloperidol  5 mg Oral Q6H PRN Ana Marte MD      hydrOXYzine HCL  25 mg Oral Q6H PRN Max 4/day Ana Marte MD      hydrOXYzine HCL  50 mg Oral Q6H PRN Max 4/day Ana Marte MD      lidocaine  1 patch Topical Daily Ana Marte MD      LORazepam  1 mg Intramuscular Q6H PRN Ana Marte MD      LORazepam  0 5 mg Oral 4x Daily Ana Marte MD      magnesium hydroxide  30 mL Oral Daily PRN Ana Marte MD      methocarbamol  500 mg Oral Q8H PRN Ana Marte MD      nicotine  1 patch Transdermal Daily Ana Marte MD      nicotine polacrilex  4 mg Oral Q2H PRN Ana Marte MD      OLANZapine  2 5 mg Oral Q8H PARISN Ana Marte MD      OLANZapine  5 mg Oral Q3H PRN Ana Marte MD      OLANZapine  7 5 mg Oral Q3H PRN Ana Marte MD      OLANZapine  10 mg Intramuscular Q3H PRN Ana Marte MD      OLANZapine  5 mg Intramuscular Q3H PRN Ana Marte MD      OLANZapine  10 mg Oral HS Ana Marte MD      OLANZapine  15 mg Oral Daily Ana Marte MD      paliperidone palmitate ER  234 mg Intramuscular Q30 Days Ana Marte MD      pantoprazole  40 mg Oral Early Morning Ana Marte MD      Pramox-PE-Glycerin-Petrolatum  1 application Rectal 4x Daily PRN Ana Marte MD      prazosin  1 mg Oral HS Ana Marte MD      psyllium  1 packet Oral Daily Ana Marte MD      sertraline  100 mg Oral Daily Ana Marte MD         Counseling / Coordination of Care: Total floor / unit time spent today 15 minutes  Greater than 50% of total time was spent with the patient and / or family counseling and / or somewhat receptive to supportive listening and teaching positive coping skills to deal with symptom mangement  Patient's Rights, confidentiality and exceptions to confidentiality, use of automated medical record, [facility] staff access to medical record, and consent to treatment reviewed  This note was  not shared with the patient because it might further aggravate her psychiatric condition  This note has been dictated

## 2021-07-02 PROCEDURE — 99232 SBSQ HOSP IP/OBS MODERATE 35: CPT | Performed by: PSYCHIATRY & NEUROLOGY

## 2021-07-02 RX ADMIN — BENZTROPINE MESYLATE 1 MG: 1 TABLET ORAL at 08:24

## 2021-07-02 RX ADMIN — PSYLLIUM HUSK 1 PACKET: 3.4 POWDER ORAL at 08:24

## 2021-07-02 RX ADMIN — METHOCARBAMOL TABLETS 500 MG: 500 TABLET, COATED ORAL at 18:22

## 2021-07-02 RX ADMIN — HALOPERIDOL 1 MG: 1 TABLET ORAL at 17:22

## 2021-07-02 RX ADMIN — NICOTINE 1 PATCH: 21 PATCH, EXTENDED RELEASE TRANSDERMAL at 08:24

## 2021-07-02 RX ADMIN — PHENYTOIN 1 MG: 125 SUSPENSION ORAL at 21:35

## 2021-07-02 RX ADMIN — LORAZEPAM 0.5 MG: 0.5 TABLET ORAL at 12:00

## 2021-07-02 RX ADMIN — LORAZEPAM 0.5 MG: 0.5 TABLET ORAL at 21:35

## 2021-07-02 RX ADMIN — HALOPERIDOL 1 MG: 1 TABLET ORAL at 08:23

## 2021-07-02 RX ADMIN — OLANZAPINE 15 MG: 5 TABLET, FILM COATED ORAL at 08:22

## 2021-07-02 RX ADMIN — LORAZEPAM 0.5 MG: 0.5 TABLET ORAL at 17:22

## 2021-07-02 RX ADMIN — DIPHENHYDRAMINE HCL 25 MG: 25 TABLET ORAL at 21:35

## 2021-07-02 RX ADMIN — OLANZAPINE 10 MG: 10 TABLET, FILM COATED ORAL at 21:35

## 2021-07-02 RX ADMIN — GABAPENTIN 600 MG: 300 CAPSULE ORAL at 12:00

## 2021-07-02 RX ADMIN — LORAZEPAM 0.5 MG: 0.5 TABLET ORAL at 08:23

## 2021-07-02 RX ADMIN — FENOFIBRATE 145 MG: 145 TABLET ORAL at 08:23

## 2021-07-02 RX ADMIN — GABAPENTIN 600 MG: 300 CAPSULE ORAL at 17:22

## 2021-07-02 RX ADMIN — BENZTROPINE MESYLATE 1 MG: 1 TABLET ORAL at 17:22

## 2021-07-02 RX ADMIN — GABAPENTIN 600 MG: 300 CAPSULE ORAL at 21:35

## 2021-07-02 RX ADMIN — GABAPENTIN 600 MG: 300 CAPSULE ORAL at 08:23

## 2021-07-02 RX ADMIN — HALOPERIDOL 1 MG: 1 TABLET ORAL at 12:00

## 2021-07-02 RX ADMIN — SERTRALINE HYDROCHLORIDE 100 MG: 100 TABLET ORAL at 08:24

## 2021-07-02 RX ADMIN — PANTOPRAZOLE SODIUM 40 MG: 40 TABLET, DELAYED RELEASE ORAL at 06:23

## 2021-07-02 RX ADMIN — LIDOCAINE 1 PATCH: 50 PATCH CUTANEOUS at 08:25

## 2021-07-02 RX ADMIN — HALOPERIDOL 1 MG: 1 TABLET ORAL at 21:35

## 2021-07-02 NOTE — PLAN OF CARE
Problem: Alteration in Thoughts and Perception  Goal: Treatment Goal: Gain control of psychotic behaviors/thinking, reduce/eliminate presenting symptoms and demonstrate improved reality functioning upon discharge  Outcome: Progressing  Goal: Verbalize thoughts and feelings  Description: Interventions:  - Promote a nonjudgmental and trusting relationship with the patient through active listening and therapeutic communication  - Assess patient's level of functioning, behavior and potential for risk  - Engage patient in 1 on 1 interactions  - Encourage patient to express fears, feelings, frustrations, and discuss symptoms    - [place] patient to reality, help patient recognize reality-based thinking   - Administer medications as ordered and assess for potential side effects  - Provide the patient education related to the signs and symptoms of the illness and desired effects of prescribed medications  Outcome: Progressing  Goal: Refrain from acting on delusional thinking/internal stimuli  Description: Interventions:  - Monitor patient closely, per order   - Utilize least restrictive measures   - Set reasonable limits, give positive feedback for acceptable   - Administer medications as ordered and monitor of potential side effects  Outcome: Progressing  Goal: Agree to be compliant with medication regime, as prescribed and report medication side effects  Description: Interventions:  - Offer appropriate PRN medication and supervise ingestion; conduct AIMS, as needed   Outcome: Progressing  Goal: Attend and participate in unit activities, including therapeutic, recreational, and educational groups  Description: Interventions:  -Encourage Visitation and family involvement in care  Outcome: Progressing  Goal: Recognize dysfunctional thoughts, communicate reality-based thoughts at the time of discharge  Description: Interventions:  - Provide medication and psycho-education to assist patient in compliance and developing insight into his/her illness   Outcome: Progressing  Goal: Complete daily ADLs, including personal hygiene independently, as able  Description: Interventions:  - Observe, teach, and assist patient with ADLS  - Monitor and promote a balance of rest/activity, with adequate nutrition and elimination   Outcome: Progressing     Problem: Ineffective Coping  Goal: Cooperates with admission process  Description: Interventions:   - Complete admission process  Outcome: Progressing  Goal: Identifies ineffective coping skills  Outcome: Progressing  Goal: Identifies healthy coping skills  Outcome: Progressing  Goal: Demonstrates healthy coping skills  Outcome: Progressing  Goal: Participates in unit activities  Description: Interventions:  - Provide therapeutic environment   - Provide required programming   - Redirect inappropriate behaviors   Outcome: Progressing  Goal: Patient/Family participate in treatment and DC plans  Description: Interventions:  - Provide therapeutic environment  Outcome: Progressing  Goal: Patient/Family verbalizes awareness of resources  Outcome: Progressing  Goal: Understands least restrictive measures  Description: Interventions:  - Utilize least restrictive behavior  Outcome: Progressing  Goal: Free from restraint events  Description: - Utilize least restrictive measures   - Provide behavioral interventions   - Redirect inappropriate behaviors   Outcome: Progressing     Problem: Risk for Self Injury/Neglect  Goal: Treatment Goal: Remain safe during length of stay, learn and adopt new coping skills, and be free of self-injurious ideation, impulses and acts at the time of discharge  Outcome: Progressing  Goal: Verbalize thoughts and feelings  Description: Interventions:  - Assess and re-assess patient's lethality and potential for self-injury  - Engage patient in 1:1 interactions, daily, for a minimum of 15 minutes  - Encourage patient to express feelings, fears, frustrations, hopes  - Establish rapport/trust with patient   Outcome: Progressing  Goal: Refrain from harming self  Description: Interventions:  - Monitor patient closely, per order  - Develop a trusting relationship  - Supervise medication ingestion, monitor effects and side effects   Outcome: Progressing  Goal: Attend and participate in unit activities, including therapeutic, recreational, and educational groups  Description: Interventions:  - Provide therapeutic and educational activities daily, encourage attendance and participation, and document same in the medical record  - Obtain collateral information, encourage visitation and family involvement in care   Outcome: Progressing  Goal: Recognize maladaptive responses and adopt new coping mechanisms  Outcome: Progressing  Goal: Complete daily ADLs, including personal hygiene independently, as able  Description: Interventions:  - Observe, teach, and assist patient with ADLS  - Monitor and promote a balance of rest/activity, with adequate nutrition and elimination  Outcome: Progressing     Problem: Depression  Goal: Treatment Goal: Demonstrate behavioral control of depressive symptoms, verbalize feelings of improved mood/affect, and adopt new coping skills prior to discharge  Outcome: Progressing  Goal: Verbalize thoughts and feelings  Description: Interventions:  - Assess and re-assess patient's level of risk   - Engage patient in 1:1 interactions, daily, for a minimum of 15 minutes   - Encourage patient to express feelings, fears, frustrations, hopes   Outcome: Progressing  Goal: Refrain from harming self  Description: Interventions:  - Monitor patient closely, per order   - Supervise medication ingestion, monitor effects and side effects   Outcome: Progressing  Goal: Refrain from isolation  Description: Interventions:  - Develop a trusting relationship   - Encourage socialization   Outcome: Progressing  Goal: Refrain from self-neglect  Outcome: Progressing  Goal: Attend and participate in unit activities, including therapeutic, recreational, and educational groups  Description: Interventions:  - Provide therapeutic and educational activities daily, encourage attendance and participation, and document same in the medical record   Outcome: Progressing  Goal: Complete daily ADLs, including personal hygiene independently, as able  Description: Interventions:  - Observe, teach, and assist patient with ADLS  -  Monitor and promote a balance of rest/activity, with adequate nutrition and elimination   Outcome: Progressing     Problem: Anxiety  Goal: Anxiety is at manageable level  Description: Interventions:  - Assess and monitor patient's anxiety level  - Monitor for signs and symptoms (heart palpitations, chest pain, shortness of breath, headaches, nausea, feeling jumpy, restlessness, irritable, apprehensive)  - Collaborate with interdisciplinary team and initiate plan and interventions as ordered    - [place] patient to unit/surroundings  - Explain treatment plan  - Encourage participation in care  - Encourage verbalization of concerns/fears  - Identify coping mechanisms  - Assist in developing anxiety-reducing skills  - Administer/offer alternative therapies  - Limit or eliminate stimulants  Outcome: Progressing     Problem: Risk for Violence/Aggression Toward Others  Goal: Treatment Goal: Refrain from acts of violence/aggression during length of stay, and demonstrate improved impulse control at the time of discharge  Outcome: Progressing  Goal: Verbalize thoughts and feelings  Description: Interventions:  - Assess and re-assess patient's level of risk, every waking shift  - Engage patient in 1:1 interactions, daily, for a minimum of 15 minutes   - Allow patient to express feelings and frustrations in a safe and non-threatening manner   - Establish rapport/trust with patient   Outcome: Progressing  Goal: Refrain from harming others  Outcome: Progressing  Goal: Refrain from destructive acts on the environment or property  Outcome: Progressing  Goal: Control angry outbursts  Description: Interventions:  - Monitor patient closely, per order  - Ensure early verbal de-escalation  - Monitor prn medication needs  - Set reasonable/therapeutic limits, outline behavioral expectations, and consequences   - Provide a non-threatening milieu, utilizing the least restrictive interventions   Outcome: Progressing  Goal: Attend and participate in unit activities, including therapeutic, recreational, and educational groups  Description: Interventions:  - Provide therapeutic and educational activities daily, encourage attendance and participation, and document same in the medical record   Outcome: Progressing  Goal: Identify appropriate positive anger management techniques  Description: Interventions:  - Offer anger management and coping skills groups   - Staff will provide positive feedback for appropriate anger control  Outcome: Progressing     Problem: Alteration in Orientation  Goal: Treatment Goal: Demonstrate a reduction of confusion and improved orientation to person, place, time and/or situation upon discharge, according to optimum baseline/ability  Outcome: Progressing  Goal: Interact with staff daily  Description: Interventions:  - Assess and re-assess patient's level of orientation  - Engage patient in 1 on 1 interactions, daily, for a minimum of 15 minutes   - Establish rapport/trust with patient   Outcome: Progressing  Goal: Express concerns related to confused thinking related to:  Description: Interventions:  - Encourage patient to express feelings, fears, frustrations, hopes  - Assign consistent caregivers   - [place]/re-orient patient as needed  - Allow comfort items, as appropriate  - Provide visual cues, signs, etc    Outcome: Not Progressing  Goal: Allow medical examinations, as recommended  Description: Interventions:  - Provide physical/neurological exams and/or referrals, per provider   Outcome: Progressing  Goal: Cooperate with recommended testing/procedures  Description: Interventions:  - Determine need for ancillary testing  - Observe for mental status changes  - Implement falls/precaution protocol   Outcome: Progressing  Goal: Attend and participate in unit activities, including therapeutic, recreational, and educational groups  Description: Interventions:  - Provide therapeutic and educational activities daily, encourage attendance and participation, and document same in the medical record   - Provide appropriate opportunities for reminiscence   - Provide a consistent daily routine   - Encourage family contact/visitation   Outcome: Progressing  Goal: Complete daily ADLs, including personal hygiene independently, as able  Description: Interventions:  - Observe, teach, and assist patient with ADLS  Outcome: Progressing     Problem: Individualized Interventions  Goal: Patient will verbalize appropriate use of telephone within 5 days  Description: Interventions:  - Treatment team to determine use of supervised phone privileges   Outcome: Progressing  Goal: Patient will verbalize need for hospitalization and will no longer attempt elopement within 5 days  Description: Interventions:  - Ongoing education to help patient understand need for hospitalization  Outcome: Progressing  Goal: Patient will recognize inappropriate behaviors and develop alternative behaviors within 5 days  Description: Interventions:  - Patient in collaboration with Treatment Team will develop a behavior management plan to help identify effective coping skills to deal with stressors  Outcome: Progressing

## 2021-07-02 NOTE — PLAN OF CARE
[facility] [address] [facility] interview scheduled for 7/6/21  Patient anticipating this and will prepare prior with SETH, encouraged to think of questions she has for them as well  Will follow up with Ervin LYNCH to inquire as to when they'd be able to open her for services again      Problem: Alteration in Thoughts and Perception  Goal: Treatment Goal: Gain control of psychotic behaviors/thinking, reduce/eliminate presenting symptoms and demonstrate improved reality functioning upon discharge  Outcome: Progressing  Goal: Verbalize thoughts and feelings  Description: Interventions:  - Promote a nonjudgmental and trusting relationship with the patient through active listening and therapeutic communication  - Assess patient's level of functioning, behavior and potential for risk  - Engage patient in 1 on 1 interactions  - Encourage patient to express fears, feelings, frustrations, and discuss symptoms    - [place] patient to reality, help patient recognize reality-based thinking   - Administer medications as ordered and assess for potential side effects  - Provide the patient education related to the signs and symptoms of the illness and desired effects of prescribed medications  Outcome: Progressing  Goal: Refrain from acting on delusional thinking/internal stimuli  Description: Interventions:  - Monitor patient closely, per order   - Utilize least restrictive measures   - Set reasonable limits, give positive feedback for acceptable   - Administer medications as ordered and monitor of potential side effects  Outcome: Progressing  Goal: Agree to be compliant with medication regime, as prescribed and report medication side effects  Description: Interventions:  - Offer appropriate PRN medication and supervise ingestion; conduct AIMS, as needed   Outcome: Progressing  Goal: Attend and participate in unit activities, including therapeutic, recreational, and educational groups  Description: Interventions:  -Encourage Visitation and family involvement in care  Outcome: Progressing  Goal: Recognize dysfunctional thoughts, communicate reality-based thoughts at the time of discharge  Description: Interventions:  - Provide medication and psycho-education to assist patient in compliance and developing insight into his/her illness   Outcome: Progressing  Goal: Complete daily ADLs, including personal hygiene independently, as able  Description: Interventions:  - Observe, teach, and assist patient with ADLS  - Monitor and promote a balance of rest/activity, with adequate nutrition and elimination   Outcome: Progressing     Problem: Ineffective Coping  Goal: Cooperates with admission process  Description: Interventions:   - Complete admission process  Outcome: Progressing  Goal: Identifies ineffective coping skills  Outcome: Progressing  Goal: Identifies healthy coping skills  Outcome: Progressing  Goal: Demonstrates healthy coping skills  Outcome: Progressing  Goal: Participates in unit activities  Description: Interventions:  - Provide therapeutic environment   - Provide required programming   - Redirect inappropriate behaviors   Outcome: Progressing  Goal: Patient/Family participate in treatment and DC plans  Description: Interventions:  - Provide therapeutic environment  Outcome: Progressing  Goal: Patient/Family verbalizes awareness of resources  Outcome: Progressing  Goal: Understands least restrictive measures  Description: Interventions:  - Utilize least restrictive behavior  Outcome: Progressing  Goal: Free from restraint events  Description: - Utilize least restrictive measures   - Provide behavioral interventions   - Redirect inappropriate behaviors   Outcome: Progressing     Problem: Risk for Self Injury/Neglect  Goal: Treatment Goal: Remain safe during length of stay, learn and adopt new coping skills, and be free of self-injurious ideation, impulses and acts at the time of discharge  Outcome: Progressing  Goal: Verbalize thoughts and feelings  Description: Interventions:  - Assess and re-assess patient's lethality and potential for self-injury  - Engage patient in 1:1 interactions, daily, for a minimum of 15 minutes  - Encourage patient to express feelings, fears, frustrations, hopes  - Establish rapport/trust with patient   Outcome: Progressing  Goal: Refrain from harming self  Description: Interventions:  - Monitor patient closely, per order  - Develop a trusting relationship  - Supervise medication ingestion, monitor effects and side effects   Outcome: Progressing  Goal: Attend and participate in unit activities, including therapeutic, recreational, and educational groups  Description: Interventions:  - Provide therapeutic and educational activities daily, encourage attendance and participation, and document same in the medical record  - Obtain collateral information, encourage visitation and family involvement in care   Outcome: Progressing  Goal: Recognize maladaptive responses and adopt new coping mechanisms  Outcome: Progressing  Goal: Complete daily ADLs, including personal hygiene independently, as able  Description: Interventions:  - Observe, teach, and assist patient with ADLS  - Monitor and promote a balance of rest/activity, with adequate nutrition and elimination  Outcome: Progressing     Problem: Depression  Goal: Treatment Goal: Demonstrate behavioral control of depressive symptoms, verbalize feelings of improved mood/affect, and adopt new coping skills prior to discharge  Outcome: Progressing  Goal: Verbalize thoughts and feelings  Description: Interventions:  - Assess and re-assess patient's level of risk   - Engage patient in 1:1 interactions, daily, for a minimum of 15 minutes   - Encourage patient to express feelings, fears, frustrations, hopes   Outcome: Progressing  Goal: Refrain from harming self  Description: Interventions:  - Monitor patient closely, per order   - Supervise medication ingestion, monitor effects and side effects   Outcome: Progressing  Goal: Refrain from isolation  Description: Interventions:  - Develop a trusting relationship   - Encourage socialization   Outcome: Progressing  Goal: Refrain from self-neglect  Outcome: Progressing  Goal: Attend and participate in unit activities, including therapeutic, recreational, and educational groups  Description: Interventions:  - Provide therapeutic and educational activities daily, encourage attendance and participation, and document same in the medical record   Outcome: Progressing  Goal: Complete daily ADLs, including personal hygiene independently, as able  Description: Interventions:  - Observe, teach, and assist patient with ADLS  -  Monitor and promote a balance of rest/activity, with adequate nutrition and elimination   Outcome: Progressing     Problem: Anxiety  Goal: Anxiety is at manageable level  Description: Interventions:  - Assess and monitor patient's anxiety level  - Monitor for signs and symptoms (heart palpitations, chest pain, shortness of breath, headaches, nausea, feeling jumpy, restlessness, irritable, apprehensive)  - Collaborate with interdisciplinary team and initiate plan and interventions as ordered    - [place] patient to unit/surroundings  - Explain treatment plan  - Encourage participation in care  - Encourage verbalization of concerns/fears  - Identify coping mechanisms  - Assist in developing anxiety-reducing skills  - Administer/offer alternative therapies  - Limit or eliminate stimulants  Outcome: Progressing     Problem: Risk for Violence/Aggression Toward Others  Goal: Treatment Goal: Refrain from acts of violence/aggression during length of stay, and demonstrate improved impulse control at the time of discharge  Outcome: Progressing  Goal: Verbalize thoughts and feelings  Description: Interventions:  - Assess and re-assess patient's level of risk, every waking shift  - Engage patient in 1:1 interactions, daily, for a minimum of 15 minutes   - Allow patient to express feelings and frustrations in a safe and non-threatening manner   - Establish rapport/trust with patient   Outcome: Progressing  Goal: Refrain from harming others  Outcome: Progressing  Goal: Refrain from destructive acts on the environment or property  Outcome: Progressing  Goal: Control angry outbursts  Description: Interventions:  - Monitor patient closely, per order  - Ensure early verbal de-escalation  - Monitor prn medication needs  - Set reasonable/therapeutic limits, outline behavioral expectations, and consequences   - Provide a non-threatening milieu, utilizing the least restrictive interventions   Outcome: Progressing  Goal: Attend and participate in unit activities, including therapeutic, recreational, and educational groups  Description: Interventions:  - Provide therapeutic and educational activities daily, encourage attendance and participation, and document same in the medical record   Outcome: Progressing  Goal: Identify appropriate positive anger management techniques  Description: Interventions:  - Offer anger management and coping skills groups   - Staff will provide positive feedback for appropriate anger control  Outcome: Progressing     Problem: Alteration in Orientation  Goal: Treatment Goal: Demonstrate a reduction of confusion and improved orientation to person, place, time and/or situation upon discharge, according to optimum baseline/ability  Outcome: Progressing  Goal: Interact with staff daily  Description: Interventions:  - Assess and re-assess patient's level of orientation  - Engage patient in 1 on 1 interactions, daily, for a minimum of 15 minutes   - Establish rapport/trust with patient   Outcome: Progressing  Goal: Express concerns related to confused thinking related to:  Description: Interventions:  - Encourage patient to express feelings, fears, frustrations, hopes  - Assign consistent caregivers   - [place]/re-orient patient as needed  - Allow comfort items, as appropriate  - Provide visual cues, signs, etc    Outcome: Progressing  Goal: Allow medical examinations, as recommended  Description: Interventions:  - Provide physical/neurological exams and/or referrals, per provider   Outcome: Progressing  Goal: Cooperate with recommended testing/procedures  Description: Interventions:  - Determine need for ancillary testing  - Observe for mental status changes  - Implement falls/precaution protocol   Outcome: Progressing  Goal: Attend and participate in unit activities, including therapeutic, recreational, and educational groups  Description: Interventions:  - Provide therapeutic and educational activities daily, encourage attendance and participation, and document same in the medical record   - Provide appropriate opportunities for reminiscence   - Provide a consistent daily routine   - Encourage family contact/visitation   Outcome: Progressing  Goal: Complete daily ADLs, including personal hygiene independently, as able  Description: Interventions:  - Observe, teach, and assist patient with ADLS  Outcome: Progressing     Problem: Individualized Interventions  Goal: Patient will verbalize appropriate use of telephone within 5 days  Description: Interventions:  - Treatment team to determine use of supervised phone privileges   Outcome: Progressing  Goal: Patient will verbalize need for hospitalization and will no longer attempt elopement within 5 days  Description: Interventions:  - Ongoing education to help patient understand need for hospitalization  Outcome: Progressing  Goal: Patient will recognize inappropriate behaviors and develop alternative behaviors within 5 days  Description: Interventions:  - Patient in collaboration with Treatment Team will develop a behavior management plan to help identify effective coping skills to deal with stressors  Outcome: Progressing     Problem: DISCHARGE PLANNING - CARE MANAGEMENT  Goal: Discharge to post-acute care or home with appropriate resources  Description: INTERVENTIONS:  - Conduct assessment to determine patient/family and health care team treatment goals, and need for post-acute services based on payer coverage, community resources, and patient preferences, and barriers to discharge  - Address psychosocial, clinical, and financial barriers to discharge as identified in assessment in conjunction with the patient/family and health care team  - Arrange appropriate level of post-acute services according to patients   needs and preference and payer coverage in collaboration with the physician and health care team  - Communicate with and update the patient/family, physician, and health care team regarding progress on the discharge plan  - Arrange appropriate transportation to post-acute venues  Outcome: Progressing

## 2021-07-02 NOTE — PROGRESS NOTES
07/02/21 1849   Team Meeting   Meeting Type Daily Rounds   Initial Conference Date 06/30/21   Patient/Family Present   Patient Present No   Patient's Family Present No       Daily Rounds Documentation  Team Members Participating  Dr Deb Jon, RN  Eugene Sullivan, DECLANW  Gian Guerra, DECLANW  Christi Connors, CPS    Case reviewed  Medication and meal compliant  Pleasant, cooperative  Informed RN she still is "hearing my boyfriend upstairs  "

## 2021-07-02 NOTE — PROGRESS NOTES
07/02/21 1412   Team Meeting   Meeting Type Daily Rounds   Initial Conference Date 07/02/21   Patient/Family Present   Patient Present No   Patient's Family Present No       Daily Rounds Documentation  Team Members Participating  MD Jesus Akers, RN  Yola Hanna, DECLANW  Luci Dickinson, MARK ANTHONY    Case reviewed  Had a screaming episode during the day regarding phone use  Slept good part of afternoon  Otherwise no outbursts or issues

## 2021-07-02 NOTE — SOCIAL WORK
Psychotherapy session held  Patient cooperative, though expressed having difficulty with psychiatric symptoms again and coping, her fears resurfacing that she will "go crazy " Session spent exploring this, offering recovery-centered perspective, and discussing different ways she can address these thoughts other than medication therapy  Patient was receptive initially, but then appeared to tune-out, became more distracted, and asked to end session so she can rest in her room  We agreed to follow up another time when she is feeling better

## 2021-07-02 NOTE — PROGRESS NOTES
07/02/21 1100   Activity/Group Checklist   Group Other (Comment)  (Recovery Management: Graduation)   Attendance Attended   Attendance Duration (min) 46-60   Interactions Interacted appropriately   Affect/Mood Appropriate;Calm

## 2021-07-02 NOTE — NURSING NOTE
Patient was visible for the beginning of the shift  After going onto the deck at 1800 she came in and was agitated and irritated  She said "you people have to stop doing this to me  It is not right" Then she asked for a "muscle relaxer" saying "my legs are all tensed up"  Then she headed immedietly to her room saying "you'll have to bring it to me" Writer tried to give patient Atarax when she returned to the desk  She refused this and insisted on Robaxin which she received at 200   She stated the Robaxin worked well but "makes her tired"  Writer once again said she should try the Cogentin prn for muscle stiffness and she said next time she feels those symptoms, she will  She was calm and cooperative the rest of the evening

## 2021-07-02 NOTE — PROGRESS NOTES
Psychiatry Progress Note Paulie Hill [age] y o  female MRN: [id number]  Unit/Bed#: [facility] 578-07 Encounter: [id number]  Code Status: Level 1 - Full Code    PCP: Jeanine Rodriguez DO    Date of Admission:  4/7/2021 1435   Date of Service:  07/02/21    Patient Active Problem List   Diagnosis    Schizoaffective disorder, bipolar type ([facility] )    Uncomplicated alcohol dependence ([facility] )    Suicidal behavior    LYNN (generalized anxiety disorder)    Slow transit constipation    Deficiency of vitamin B    Degenerative disc disease, lumbar    Post-traumatic stress disorder, chronic    Lower extremity weakness    Generalized abdominal pain    Non-intractable vomiting    Medical clearance for psychiatric admission    Carbuncle    Alcohol dependence with unspecified alcohol-induced disorder ([facility] )    Mild intermittent asthma without complication    Tobacco abuse    Ear problem, bilateral    Gastroesophageal reflux disease    Right foot pain    Ear problem, right     Review of systems:      Unremarkable but needed Voltaren gel for her feet  Diagnosis:            Schizoaffective bipolar, alcohol use disorder in early remission in controlled environment  Assessment   Overall Status:         Was upset with the nurse for redirected her on using the phone during group time and was yelling and agitated but then later come otherwise not overheard yelling and talking to herself   Certification Statement: The patient will continue to require additional inpatient hospital stay due to ongoing mood swings paranoia   Acceptance by patient:  accepting    Hopefulness in recovery:  Living in a group home   Understanding of medications: has good understanding   Involved in reintegration process:  Adjusting to the unit   Trusting in relationship with psychiatrist:  trusting   Justification for dual anti-psychotics: due to lack of response to single antipsychotics   Side effects from treatment: none    Medication changes   None  today  Medication Education; Risks and S/E and precautions of all meds given to patient and she did verbalize an understanding   Non-pharmacological treatments   Continue with individual, group, milieu and occupational therapy using recovery principles and psycho-education about accepting illness and the need for treatment  Safety   Safety and communication plan established to target dynamic risk factors discussed above  Discharge Plan     most likely to a group home with the act team again     Interval Progress     As upset with the nurse for redirected her from using the phone to group time and then accused the staff of keeping her here as a pleasant more extensive wish having or rights  I did talk to her about this and finally she agreed to comply with the expectations and rules and strep complaining  She is still delusional about people stalking and living upstairs and talking to her through the vents on the ceiling and continues to question the staff are still hypnotizing her usually friendly pleasant polite but tends to become suspicious at times as to the motives of staff when directing but no yelling reported otherwise did not need any p r n      Sleep:                           good   appetite:                      good   compliance with medications :       compliant   Side  Effects:                                  None ripped   significant events:                          No yelling but upset staff for correcting from using the phone during group time   group attendance:                         Attending some    Mental Status Exam  Appearance: age appropriate, casually dressed, looks older than stated age, underweight   Well groomed today  Behavior: normal, pleasant, cooperative, appears anxious, mildly anxious,  more friendly today and not confrontational     Speech: fluent, clear, coherent, increased rate, pressured, hypertalkative,   Circumstantial pressured Mood: depressed, anxious, euphoric  Affect: labile, reactive, slightly brighter    Thought Process: normal abstract reasoning, less prominent, tends to be pressured and circumstantial and preoccupied perseverating on discharge    Thought Content: some paranoia, ideas of reference, but does appear as if paranoid  No current suicidal homicidal thoughts intent or plans verbalized  No phobias obsessions compulsions or distorted body perceptions elicited  Also believes TV is sending her messages from movie stars checking her if she is good or bad and threatening to get the feds to go after her  Still things some staff may not like her   Perceptual Disturbances: no auditory hallucinations, no visual hallucinations, denies auditory hallucinations when asked, does not appear responding to internal stimuli, no voices reported today     No vices today   Hx Risk Factors: chronic mood disorder, chronic psychotic symptoms, alcohol use, limted insight  Sensorium:         Oriented to situation and to 3 spheres  Cognition: recent and remote memory grossly intact  Consciousness: alert and awake  Attention: attention span and concentration are age appropriate  Intellect: appears to be of average intelligence  Insight: improving  Judgement: improving  Motor Activity: no abnormal movements     Vitals  Temp:  [98 3 °F (36 8 °C)-98 7 °F (37 1 °C)] 98 7 °F (37 1 °C)  HR:  [76-86] 76  Resp:  [15-17] 17  BP: ()/(57-61) 106/61  No intake or output data in the 24 hours ending 07/02/21 0748    Lab Results:  [facility] Admission Reviewed     Current Facility-Administered Medications   Medication Dose Route Frequency Provider Last Rate    acetaminophen  650 mg Oral Q6H PRN Giovani Leavitt MD      acetaminophen  650 mg Oral Q4H PRN Giovani Leavitt MD      acetaminophen  975 mg Oral Q6H PRN Giovani Leavitt MD      al mag oxide-diphenhydramine-lidocaine viscous  10 mL Swish & Swallow Q4H PRN MD Lizbeth Prince albuterol  2 puff Inhalation Q6H PRN Dmitriy Ornelas MD      aluminum-magnesium hydroxide-simethicone  15 mL Oral Q4H PRN Dmitriy Ornelas MD      benztropine  1 mg Intramuscular Q4H PRN Max 6/day Dmitriy Ornelas MD      benztropine  1 mg Oral Q4H PRN Max 6/day Dmitriy Ornelas MD      benztropine  1 mg Oral BID Dmitriy Ornelas MD      calcium carbonate  500 mg Oral TID PRN Dmitriy Ornelas MD      Diclofenac Sodium  2 g Topical 4x Daily PRN Dmitriy Ornelas MD      diphenhydrAMINE  25 mg Oral HS Dmitriy Ornelas MD      fenofibrate  145 mg Oral Daily Dmitriy Ornelas MD      gabapentin  600 mg Oral 4x Daily Dmitriy Ornelas MD      haloperidol  1 mg Oral 4x Daily Dmitriy Ornelas MD      haloperidol  5 mg Oral Q6H PRN Dmitriy Ornelas MD      hydrOXYzine HCL  25 mg Oral Q6H PRN Max 4/day Dmitriy Ornelas MD      hydrOXYzine HCL  50 mg Oral Q6H PRN Max 4/day Dmitriy Ornelas MD      lidocaine  1 patch Topical Daily Dmitriy Ornelas MD      LORazepam  1 mg Intramuscular Q6H PRN Dmitriy Ornelas MD      LORazepam  0 5 mg Oral 4x Daily Dmitriy Ornelas MD      magnesium hydroxide  30 mL Oral Daily PRN Dmitriy Ornelas MD      methocarbamol  500 mg Oral Q8H PRN Dmitriy Ornelas MD      nicotine  1 patch Transdermal Daily Dmitriy Ornelas MD      nicotine polacrilex  4 mg Oral Q2H PRN Dmitriy Ornelas MD      OLANZapine  2 5 mg Oral Q8H PRN Dmitriy Ornelas MD      OLANZapine  5 mg Oral Q3H PRN Dmitriy Ornelas MD      OLANZapine  7 5 mg Oral Q3H PRN Dmitriy Ornelas MD      OLANZapine  10 mg Intramuscular Q3H PRN Dmitriy Ornelas MD      OLANZapine  5 mg Intramuscular Q3H PRN Dmitriy Ornelas MD      OLANZapine  10 mg Oral HS Dmitriy Ornelas MD      OLANZapine  15 mg Oral Daily Dmitriy Ornelas MD      paliperidone palmitate ER  234 mg Intramuscular Q30 Days Dmitriy Ornelas MD      pantoprazole  40 mg Oral Early Morning Dmitriy Ornelas MD      Pramox-PE-Glycerin-Petrolatum  1 application Rectal 4x Daily PRN Dmitriy Ornelas MD      prazosin  1 mg Oral HS Dmitriy Ornelas MD      psyllium  1 packet Oral Daily Ana Marte MD      sertraline  100 mg Oral Daily Ana Marte MD         Counseling / Coordination of Care: Total floor / unit time spent today 15 minutes  Greater than 50% of total time was spent with the patient and / or family counseling and / or somewhat receptive to supportive listening and teaching positive coping skills to deal with symptom mangement  Patient's Rights, confidentiality and exceptions to confidentiality, use of automated medical record, [address] [place] [address] staff access to medical record, and consent to treatment reviewed  This note was  not shared with the patient because it might further aggravate her psychiatric condition  This note has been dictated

## 2021-07-03 PROCEDURE — 99232 SBSQ HOSP IP/OBS MODERATE 35: CPT | Performed by: PSYCHIATRY & NEUROLOGY

## 2021-07-03 RX ADMIN — PHENYTOIN 1 MG: 125 SUSPENSION ORAL at 21:40

## 2021-07-03 RX ADMIN — PSYLLIUM HUSK 1 PACKET: 3.4 POWDER ORAL at 08:19

## 2021-07-03 RX ADMIN — PANTOPRAZOLE SODIUM 40 MG: 40 TABLET, DELAYED RELEASE ORAL at 06:11

## 2021-07-03 RX ADMIN — LORAZEPAM 0.5 MG: 0.5 TABLET ORAL at 21:41

## 2021-07-03 RX ADMIN — GABAPENTIN 600 MG: 300 CAPSULE ORAL at 17:04

## 2021-07-03 RX ADMIN — LIDOCAINE 1 PATCH: 50 PATCH CUTANEOUS at 08:24

## 2021-07-03 RX ADMIN — ALBUTEROL SULFATE 2 PUFF: 90 AEROSOL, METERED RESPIRATORY (INHALATION) at 15:46

## 2021-07-03 RX ADMIN — GABAPENTIN 600 MG: 300 CAPSULE ORAL at 11:58

## 2021-07-03 RX ADMIN — LORAZEPAM 0.5 MG: 0.5 TABLET ORAL at 11:58

## 2021-07-03 RX ADMIN — OLANZAPINE 15 MG: 5 TABLET, FILM COATED ORAL at 08:20

## 2021-07-03 RX ADMIN — DICLOFENAC SODIUM 2 G: 10 GEL TOPICAL at 15:47

## 2021-07-03 RX ADMIN — BENZTROPINE MESYLATE 1 MG: 1 TABLET ORAL at 17:04

## 2021-07-03 RX ADMIN — LORAZEPAM 0.5 MG: 0.5 TABLET ORAL at 08:21

## 2021-07-03 RX ADMIN — SERTRALINE HYDROCHLORIDE 100 MG: 100 TABLET ORAL at 08:21

## 2021-07-03 RX ADMIN — NICOTINE 1 PATCH: 21 PATCH, EXTENDED RELEASE TRANSDERMAL at 08:24

## 2021-07-03 RX ADMIN — HALOPERIDOL 1 MG: 1 TABLET ORAL at 08:20

## 2021-07-03 RX ADMIN — HALOPERIDOL 1 MG: 1 TABLET ORAL at 11:58

## 2021-07-03 RX ADMIN — GABAPENTIN 600 MG: 300 CAPSULE ORAL at 21:41

## 2021-07-03 RX ADMIN — HALOPERIDOL 1 MG: 1 TABLET ORAL at 17:04

## 2021-07-03 RX ADMIN — LORAZEPAM 0.5 MG: 0.5 TABLET ORAL at 17:04

## 2021-07-03 RX ADMIN — OLANZAPINE 10 MG: 10 TABLET, FILM COATED ORAL at 21:40

## 2021-07-03 RX ADMIN — DIPHENHYDRAMINE HCL 25 MG: 25 TABLET ORAL at 21:41

## 2021-07-03 RX ADMIN — GABAPENTIN 600 MG: 300 CAPSULE ORAL at 08:21

## 2021-07-03 RX ADMIN — METHOCARBAMOL TABLETS 500 MG: 500 TABLET, COATED ORAL at 21:40

## 2021-07-03 RX ADMIN — HALOPERIDOL 1 MG: 1 TABLET ORAL at 21:41

## 2021-07-03 RX ADMIN — BENZTROPINE MESYLATE 1 MG: 1 TABLET ORAL at 08:20

## 2021-07-03 RX ADMIN — FENOFIBRATE 145 MG: 145 TABLET ORAL at 08:19

## 2021-07-03 NOTE — PLAN OF CARE
Problem: Alteration in Thoughts and Perception  Goal: Agree to be compliant with medication regime, as prescribed and report medication side effects  Description: Interventions:  - Offer appropriate PRN medication and supervise ingestion; conduct AIMS, as needed   Outcome: Progressing  Goal: Attend and participate in unit activities, including therapeutic, recreational, and educational groups  Description: Interventions:  -Encourage Visitation and family involvement in care  Outcome: Progressing

## 2021-07-03 NOTE — PROGRESS NOTES
Progress Note - Behavioral Health   Rubens Rider 49 y o  female MRN: [id number]  Unit/Bed#: [facility] 112-01 Encounter: [id number]    This note was not shared with the patient due to reasonable likelihood of causing patient harm     The patient was seen for continuing care and reviewed with treatment team   Staff reports no significant change in the past 24 hours  Patient was observed in her room, sitting on her bed  She is pleasant and cooperative upon approach, displays good eye contact throughout the encounter, with an appropriate affect noted  She is denying any depression or anxiety symptoms currently  She is denying suicidal/ homicidal ideations or auditory/ visual hallucinations  Patient did report she heard voices in the past telling her to kill herself, however, it has been quite sometime since that occurred and feels her medications have been very effective  She is reporting adequate sleep, appetite, and energy level  She is compliant with medications and no side effects noted at this time  No agitation noted      Mental Status Evaluation:  Appearance:  Adequate hygiene and grooming and Good eye contact   Behavior:  cooperative and friendly   Mood:  euthymic   Affect: mood-congruent   Speech: Normal rate and Normal volume   Thought Process:  Circumstantial   Thought Content:  Does not verbalize delusional material   Perceptual Disturbances: Denies hallucinations and does not appear to be responding to internal stimuli   Risk Potential: No suicidal or homicidal ideation   Attention/Concentration attention span and concentration were age appropriate   Orientation:   Alert and awake   Gait/Station: normal gait/station and normal balance   Motor Activity: No abnormal movement noted     Progress Toward Goals: No change    Assessment/Plan    Principal Problem:    Schizoaffective disorder, bipolar type ([facility] [address] )  Active Problems:    Post-traumatic stress disorder, chronic    Medical clearance for psychiatric admission    Mild intermittent asthma without complication    Ear problem, bilateral    Gastroesophageal reflux disease    Right foot pain    Ear problem, right      Recommended Treatment:   1  Continue with pharmacotherapy, group therapy, milieu therapy and occupational therapy  2   Continue with safety checks per unit protocol  3   No medication changes at this time    The patient will be maintained on the following medications:    Current Facility-Administered Medications   Medication Dose Route Frequency Provider Last Rate    acetaminophen  650 mg Oral Q6H PRN Giovani Leavitt MD      acetaminophen  650 mg Oral Q4H PRN Giovani Leavitt MD      acetaminophen  975 mg Oral Q6H PRN Giovani Leavitt MD      al mag oxide-diphenhydramine-lidocaine viscous  10 mL Swish & Swallow Q4H PRN Giovani Leavitt MD      albuterol  2 puff Inhalation Q6H PRN Giovani Leavitt MD      aluminum-magnesium hydroxide-simethicone  15 mL Oral Q4H PRN Giovani Leavitt MD      benztropine  1 mg Intramuscular Q4H PRN Max 6/day Giovani Leavitt MD      benztropine  1 mg Oral Q4H PRN Max 6/day Giovani Leavitt MD      benztropine  1 mg Oral BID Giovani Leavitt MD      calcium carbonate  500 mg Oral TID PRN Giovani Leavitt MD      Diclofenac Sodium  2 g Topical 4x Daily PRN Giovani Leavitt MD      diphenhydrAMINE  25 mg Oral HS Giovani Leavitt MD      fenofibrate  145 mg Oral Daily Giovani Leavitt MD      gabapentin  600 mg Oral 4x Daily Giovani Leavitt MD      haloperidol  1 mg Oral 4x Daily Giovani Leavitt MD      haloperidol  5 mg Oral Q6H PRN Giovani Leavitt MD      hydrOXYzine HCL  25 mg Oral Q6H PRN Max 4/day Giovani Leavitt MD      hydrOXYzine HCL  50 mg Oral Q6H PRN Max 4/day Giovani Leavitt MD      lidocaine  1 patch Topical Daily Giovani Leavitt MD      LORazepam  1 mg Intramuscular Q6H PRN Giovani Leavitt MD      LORazepam  0 5 mg Oral 4x Daily Giovani Leavitt MD      magnesium hydroxide  30 mL Oral Daily PRN Giovani Leavitt MD      methocarbamol  500 mg Oral Q8H PRN Giovani Leavitt MD      nicotine  1 patch Transdermal Daily Chloe Shall, MD      nicotine polacrilex  4 mg Oral Q2H PRN Chloe Shall, MD      OLANZapine  2 5 mg Oral Q8H PRN Chloe Shall, MD      OLANZapine  5 mg Oral Q3H PRN Chloe Shall, MD      OLANZapine  7 5 mg Oral Q3H PRN Chloe Shall, MD      OLANZapine  10 mg Intramuscular Q3H PRN Chloe Shall, MD      OLANZapine  5 mg Intramuscular Q3H PRN Chloe Shall, MD      OLANZapine  10 mg Oral HS Chloe Shall, MD      OLANZapine  15 mg Oral Daily Chloe Shall, MD      paliperidone palmitate ER  234 mg Intramuscular Q30 Days Chloe Shall, MD      pantoprazole  40 mg Oral Early Morning Chloe Shall, MD      Pramox-PE-Glycerin-Petrolatum  1 application Rectal 4x Daily PRN Chloe Shall, MD      prazosin  1 mg Oral HS Chloe Shall, MD      psyllium  1 packet Oral Daily Chloe Shall, MD      sertraline  100 mg Oral Daily Chloe Shall, MD         Risks, benefits and possible side effects of Medications:   Risks, benefits, and possible side effects of medications explained to patient and patient verbalizes understanding  Portions of the record may have been created with voice recognition software  Occasional wrong word or "sound a like" substitutions may have occurred due to the inherent limitations of voice recognition software  Read the chart carefully and recognize, using context, where substitutions have occurred

## 2021-07-03 NOTE — NURSING NOTE
Alert, cooperative and visible intermittently  No SI or HI noted  Denies depression, anxiety and pain  Attended community meeting, and fresh air  Consumed 100% of breakfast and 100% of lunch  Took all medication without prompting  Maintained on safe precautions without incident   Will continue to monitor progress and recovery program

## 2021-07-04 PROCEDURE — 99232 SBSQ HOSP IP/OBS MODERATE 35: CPT | Performed by: PSYCHIATRY & NEUROLOGY

## 2021-07-04 RX ADMIN — OLANZAPINE 15 MG: 5 TABLET, FILM COATED ORAL at 08:06

## 2021-07-04 RX ADMIN — PHENYTOIN 1 MG: 125 SUSPENSION ORAL at 21:26

## 2021-07-04 RX ADMIN — NICOTINE 1 PATCH: 21 PATCH, EXTENDED RELEASE TRANSDERMAL at 08:10

## 2021-07-04 RX ADMIN — PANTOPRAZOLE SODIUM 40 MG: 40 TABLET, DELAYED RELEASE ORAL at 06:04

## 2021-07-04 RX ADMIN — GABAPENTIN 600 MG: 300 CAPSULE ORAL at 21:26

## 2021-07-04 RX ADMIN — BENZTROPINE MESYLATE 1 MG: 1 TABLET ORAL at 08:06

## 2021-07-04 RX ADMIN — GABAPENTIN 600 MG: 300 CAPSULE ORAL at 17:00

## 2021-07-04 RX ADMIN — LIDOCAINE 1 PATCH: 50 PATCH CUTANEOUS at 08:07

## 2021-07-04 RX ADMIN — SERTRALINE HYDROCHLORIDE 100 MG: 100 TABLET ORAL at 08:06

## 2021-07-04 RX ADMIN — LORAZEPAM 0.5 MG: 0.5 TABLET ORAL at 21:26

## 2021-07-04 RX ADMIN — LORAZEPAM 0.5 MG: 0.5 TABLET ORAL at 11:53

## 2021-07-04 RX ADMIN — HALOPERIDOL 1 MG: 1 TABLET ORAL at 21:26

## 2021-07-04 RX ADMIN — HALOPERIDOL 1 MG: 1 TABLET ORAL at 08:06

## 2021-07-04 RX ADMIN — GABAPENTIN 600 MG: 300 CAPSULE ORAL at 08:07

## 2021-07-04 RX ADMIN — LORAZEPAM 0.5 MG: 0.5 TABLET ORAL at 17:00

## 2021-07-04 RX ADMIN — GABAPENTIN 600 MG: 300 CAPSULE ORAL at 11:53

## 2021-07-04 RX ADMIN — BENZTROPINE MESYLATE 1 MG: 1 TABLET ORAL at 17:00

## 2021-07-04 RX ADMIN — OLANZAPINE 10 MG: 10 TABLET, FILM COATED ORAL at 21:25

## 2021-07-04 RX ADMIN — HALOPERIDOL 1 MG: 1 TABLET ORAL at 17:00

## 2021-07-04 RX ADMIN — DIPHENHYDRAMINE HCL 25 MG: 25 TABLET ORAL at 21:26

## 2021-07-04 RX ADMIN — PSYLLIUM HUSK 1 PACKET: 3.4 POWDER ORAL at 08:08

## 2021-07-04 RX ADMIN — FENOFIBRATE 145 MG: 145 TABLET ORAL at 08:06

## 2021-07-04 RX ADMIN — HALOPERIDOL 1 MG: 1 TABLET ORAL at 11:53

## 2021-07-04 RX ADMIN — LORAZEPAM 0.5 MG: 0.5 TABLET ORAL at 08:06

## 2021-07-04 NOTE — NURSING NOTE
Elida, cooperative, visible, attended the ice cream social and wrap up group, compliant with routine medications, used Robaxin at hs for compliant of muscle spasms

## 2021-07-04 NOTE — NURSING NOTE
Alert, cooperative and visible intermittently  Consumed 75% of dinner  Took all medication without prompting   Maintained on safe precautions without incident

## 2021-07-04 NOTE — PROGRESS NOTES
Progress Note - Behavioral Health   Mahalia Ormond 49 y o  female MRN: [id number]  Unit/Bed#: [facility] 112-01 Encounter: [id number]    This note was not shared with the patient due to reasonable likelihood of causing patient harm     The patient was seen for continuing care and reviewed with treatment team   Staff reports no significant change in the past 24 hours  Patient was observed in her room  She is pleasant and cooperative upon approach, displays good eye contact throughout the encounter, with an appropriate affect noted  Patient continues to deny any depression or anxiety symptoms  She continues to deny any suicidal / homicidal ideation or auditory/ visual hallucinations  She continues to report adequate sleep, appetite, energy level  She is compliant with medications and no side effects noted at this time  Patient did states she feels "really good today" and feels that she is getting better with each day and believes her medications are effective  No agitation noted      Mental Status Evaluation:  Appearance:  Adequate hygiene and grooming and Good eye contact   Behavior:  cooperative and friendly   Mood:  euthymic   Affect: mood-congruent   Speech: Normal rate and Normal volume   Thought Process:  Circumstantial   Thought Content:  Does not verbalize delusional material   Perceptual Disturbances: Denies hallucinations and does not appear to be responding to internal stimuli   Risk Potential: No suicidal or homicidal ideation   Attention/Concentration attention span and concentration were age appropriate   Orientation:   Alert and awake   Gait/Station: normal gait/station and normal balance   Motor Activity: No abnormal movement noted     Progress Toward Goals: no change    Assessment/Plan    Principal Problem:    Schizoaffective disorder, bipolar type (HCC)  Active Problems:    Post-traumatic stress disorder, chronic    Medical clearance for psychiatric admission    Mild intermittent asthma without complication    Ear problem, bilateral    Gastroesophageal reflux disease    Right foot pain    Ear problem, right      Recommended Treatment:   1  Continue with pharmacotherapy, group therapy, milieu therapy and occupational therapy  2   Continue with safety checks per unit protocol  3   No medication changes at this time    The patient will be maintained on the following medications:    Current Facility-Administered Medications   Medication Dose Route Frequency Provider Last Rate    acetaminophen  650 mg Oral Q6H PRN Courtney Guard, MD      acetaminophen  650 mg Oral Q4H PRN Courtney Guard, MD      acetaminophen  975 mg Oral Q6H PRN Courtney Guard, MD      al mag oxide-diphenhydramine-lidocaine viscous  10 mL Swish & Swallow Q4H PRN Courtney Guard, MD      albuterol  2 puff Inhalation Q6H PRN Courtney Guard, MD      aluminum-magnesium hydroxide-simethicone  15 mL Oral Q4H PRN Courtney Guard, MD      benztropine  1 mg Intramuscular Q4H PRN Max 6/day Courtney Guard, MD      benztropine  1 mg Oral Q4H PRN Max 6/day Courtney Guard, MD      benztropine  1 mg Oral BID Courtney Guard, MD      calcium carbonate  500 mg Oral TID PRN Courtney Guard, MD      Diclofenac Sodium  2 g Topical 4x Daily PRN Courtney Guard, MD      diphenhydrAMINE  25 mg Oral HS Courtney Guard, MD      fenofibrate  145 mg Oral Daily Courtney Guard, MD      gabapentin  600 mg Oral 4x Daily Courtney Guard, MD      haloperidol  1 mg Oral 4x Daily Courtney Guard, MD      haloperidol  5 mg Oral Q6H PRN Courtney Guard, MD      hydrOXYzine HCL  25 mg Oral Q6H PRN Max 4/day Courtney Guard, MD      hydrOXYzine HCL  50 mg Oral Q6H PRN Max 4/day Courtney Guard, MD      lidocaine  1 patch Topical Daily Courtney Guard, MD      LORazepam  1 mg Intramuscular Q6H PRN Courtney Guard, MD      LORazepam  0 5 mg Oral 4x Daily Courtney Guard, MD      magnesium hydroxide  30 mL Oral Daily PRN Courtney Guard, MD      methocarbamol  500 mg Oral Q8H PRN Courtney Guard, MD      nicotine  1 patch Transdermal Daily Melanie Treadwell MD      nicotine polacrilex  4 mg Oral Q2H PRN Melanie Treadwell MD      OLANZapine  2 5 mg Oral Q8H PRN Melanie Treadwell MD      OLANZapine  5 mg Oral Q3H PRN Melanie Treadwell MD      OLANZapine  7 5 mg Oral Q3H PRN Melanie Treadwell MD      OLANZapine  10 mg Intramuscular Q3H PRN Melanie Treadwell MD      OLANZapine  5 mg Intramuscular Q3H PRN Melanie Treadwell MD      OLANZapine  10 mg Oral HS Melanie Treadwell MD      OLANZapine  15 mg Oral Daily Melanie Treadwell MD      paliperidone palmitate ER  234 mg Intramuscular Q30 Days Melanie Treadwell MD      pantoprazole  40 mg Oral Early Morning Melanie Treadwell MD      Pramox-PE-Glycerin-Petrolatum  1 application Rectal 4x Daily PRN Melanie Treadwell MD      prazosin  1 mg Oral HS Melanie Treadwell MD      psyllium  1 packet Oral Daily Melanie Treadwell MD      sertraline  100 mg Oral Daily Melanie Treadwell MD         Risks, benefits and possible side effects of Medications:   Risks, benefits, and possible side effects of medications explained to patient and patient verbalizes understanding  Portions of the record may have been created with voice recognition software  Occasional wrong word or "sound a like" substitutions may have occurred due to the inherent limitations of voice recognition software  Read the chart carefully and recognize, using context, where substitutions have occurred

## 2021-07-04 NOTE — NURSING NOTE
Alert, cooperative and visible intermittently  No SI or HI noted  Denies depression, anxiety and pain  Attended community meeting, journaling, and fresh air  Consumed 100% of breakfast and 100% of lunch  Took all medication without prompting  Maintained on safe precautions without incident   Will continue to monitor progress and recovery program

## 2021-07-05 PROCEDURE — 99232 SBSQ HOSP IP/OBS MODERATE 35: CPT | Performed by: NURSE PRACTITIONER

## 2021-07-05 RX ADMIN — LIDOCAINE 1 PATCH: 50 PATCH CUTANEOUS at 08:23

## 2021-07-05 RX ADMIN — DIPHENHYDRAMINE HCL 25 MG: 25 TABLET ORAL at 21:04

## 2021-07-05 RX ADMIN — HALOPERIDOL 1 MG: 1 TABLET ORAL at 08:22

## 2021-07-05 RX ADMIN — PANTOPRAZOLE SODIUM 40 MG: 40 TABLET, DELAYED RELEASE ORAL at 06:04

## 2021-07-05 RX ADMIN — HALOPERIDOL 1 MG: 1 TABLET ORAL at 12:10

## 2021-07-05 RX ADMIN — FENOFIBRATE 145 MG: 145 TABLET ORAL at 08:22

## 2021-07-05 RX ADMIN — OLANZAPINE 15 MG: 5 TABLET, FILM COATED ORAL at 08:22

## 2021-07-05 RX ADMIN — HALOPERIDOL 1 MG: 1 TABLET ORAL at 21:04

## 2021-07-05 RX ADMIN — GABAPENTIN 600 MG: 300 CAPSULE ORAL at 12:10

## 2021-07-05 RX ADMIN — BENZTROPINE MESYLATE 1 MG: 1 TABLET ORAL at 08:22

## 2021-07-05 RX ADMIN — BENZTROPINE MESYLATE 1 MG: 1 TABLET ORAL at 17:01

## 2021-07-05 RX ADMIN — HALOPERIDOL 1 MG: 1 TABLET ORAL at 17:01

## 2021-07-05 RX ADMIN — LORAZEPAM 0.5 MG: 0.5 TABLET ORAL at 08:22

## 2021-07-05 RX ADMIN — NICOTINE 1 PATCH: 21 PATCH, EXTENDED RELEASE TRANSDERMAL at 08:23

## 2021-07-05 RX ADMIN — GABAPENTIN 600 MG: 300 CAPSULE ORAL at 08:22

## 2021-07-05 RX ADMIN — PHENYTOIN 1 MG: 125 SUSPENSION ORAL at 21:04

## 2021-07-05 RX ADMIN — SERTRALINE HYDROCHLORIDE 100 MG: 100 TABLET ORAL at 08:22

## 2021-07-05 RX ADMIN — GABAPENTIN 600 MG: 300 CAPSULE ORAL at 17:01

## 2021-07-05 RX ADMIN — OLANZAPINE 10 MG: 10 TABLET, FILM COATED ORAL at 21:04

## 2021-07-05 RX ADMIN — PSYLLIUM HUSK 1 PACKET: 3.4 POWDER ORAL at 08:24

## 2021-07-05 RX ADMIN — GABAPENTIN 600 MG: 300 CAPSULE ORAL at 21:04

## 2021-07-05 RX ADMIN — LORAZEPAM 0.5 MG: 0.5 TABLET ORAL at 12:10

## 2021-07-05 RX ADMIN — LORAZEPAM 0.5 MG: 0.5 TABLET ORAL at 17:01

## 2021-07-05 RX ADMIN — LORAZEPAM 0.5 MG: 0.5 TABLET ORAL at 21:04

## 2021-07-05 NOTE — PLAN OF CARE
Problem: Alteration in Thoughts and Perception  Goal: Agree to be compliant with medication regime, as prescribed and report medication side effects  Description: Interventions:  - Offer appropriate PRN medication and supervise ingestion; conduct AIMS, as needed   Outcome: Progressing  Goal: Attend and participate in unit activities, including therapeutic, recreational, and educational groups  Description: Interventions:  -Encourage Visitation and family involvement in care  Outcome: Progressing  Goal: Complete daily ADLs, including personal hygiene independently, as able  Description: Interventions:  - Observe, teach, and assist patient with ADLS  - Monitor and promote a balance of rest/activity, with adequate nutrition and elimination   Outcome: Progressing     Problem: Ineffective Coping  Goal: Participates in unit activities  Description: Interventions:  - Provide therapeutic environment   - Provide required programming   - Redirect inappropriate behaviors   Outcome: Progressing     Problem: Risk for Self Injury/Neglect  Goal: Refrain from harming self  Description: Interventions:  - Monitor patient closely, per order  - Develop a trusting relationship  - Supervise medication ingestion, monitor effects and side effects   Outcome: Progressing     Problem: Depression  Goal: Refrain from isolation  Description: Interventions:  - Develop a trusting relationship   - Encourage socialization   Outcome: Progressing     Problem: Alteration in Thoughts and Perception  Goal: Refrain from acting on delusional thinking/internal stimuli  Description: Interventions:  - Monitor patient closely, per order   - Utilize least restrictive measures   - Set reasonable limits, give positive feedback for acceptable   - Administer medications as ordered and monitor of potential side effects  Outcome: Not Progressing

## 2021-07-05 NOTE — NURSING NOTE
Polite, cooperative, visible, attended wrap up group, compliant with routine medications, will continue to monitor

## 2021-07-05 NOTE — PROGRESS NOTES
Progress Note - Behavioral Health     Elisa Carvajaltiffanie 49 y o  female MRN: [id number]   Unit/Bed#: [facility] 112-01 Encounter: [id number]        Pearl Segundo was seen today for continuation of care, records reviewed and patient was discussed with nursing team   Per nursing report patient has been compliant with medication, she denies side effects  She has had no major outbursts she did have 1 episode during group where she was somewhat upset however she was easily redirected  She did receive Robaxin 1 time over the weekend for cramping in her legs however she has not required any additional medications  Upon approach today Lucila is very calm cooperative and pleasant with this provider  She denies suicidal or homicidal ideation, she denies auditory visual hallucinations and she denies delusional thinking  She reports that she has been sleeping well overall she denies any nightmares  She states that her appetite has been good she denies side effects to medication and feels that Cogentin helps for the most part with muscle spasms  In legs  Of note this provider did not note any abnormal movements of legs  Throughout interview today she appeared somewhat paranoid at times however she did not verbalize any delusional material   She is hyper talkative and her speech is mildly pressured    Medication side effects: No   ROS: reports Intermittent leg cramps, all other systems are negative    Mental Status Evaluation:    Appearance:  casually dressed, adequate grooming   Behavior:  pleasant, cooperative, calm, Appears slightly anxious   Speech:  hypertalkative, slightly pressured   Mood:  slightly anxious, mildly depressed   Affect:  labile   Thought Process:  circumstantial   Thought Content:  some paranoia, And appears somewhat suspicious at times   Perceptual Disturbances: denies auditory or visual hallucinations when asked, but appears distracted   Risk Potential: Suicidal ideation - None  Homicidal ideation - None  Potential for aggression - Not at present   Sensorium:  oriented to person, place and time/date   Consciousness:  alert and awake   Attention: attention span and concentration appear shorter than expected for age   Insight:  partial   Judgment: partial   Gait/Station: normal gait/station, normal balance   Motor Activity: no abnormal movements     Vital signs in last 24 hours:    Temp:  [97 8 °F (36 6 °C)-98 7 °F (37 1 °C)] 98 7 °F (37 1 °C)  HR:  [78-91] 88  Resp:  [16-18] 16  BP: ()/(61-74) 113/74    Laboratory results:   No new labs  Suicide/Homicide Risk Assessment:    Risk of Harm to Self:   Nursing Suicide Risk Assessment Last 24 hours: C-SSRS Risk (Since Last Contact)  Calculated C-SSRS Risk Score (Since Last Contact): No Risk Indicated    Risk of Harm to Others:  Nursing Homicide Risk Assessment: Violence Risk to Others: Denies within past 6 months    The following interventions are recommended: behavioral checks  Per unit protocol, continued hospitalization on locked unit    Progress Toward Goals: Unchanged    Assessment/Plan   Principal Problem:    Schizoaffective disorder, bipolar type (HCC)  Active Problems:    Post-traumatic stress disorder, chronic    Medical clearance for psychiatric admission    Mild intermittent asthma without complication    Ear problem, bilateral    Gastroesophageal reflux disease    Right foot pain    Ear problem, right    Recommended Treatment:     Planned medication and treatment changes:     All current active medications have been reviewed  Encourage group therapy, milieu therapy and occupational therapy  Behavioral Health checks every 7 minutes  Per chart review and attending notes potentially discharge to group home with act team when psychiatrically stable  Continue current medications:  Current Facility-Administered Medications   Medication Dose Route Frequency Provider Last Rate    acetaminophen  650 mg Oral Q6H PRN Lorna Roe MD      acetaminophen  650 mg Oral Q4H PRN Brooke Mack MD      acetaminophen  975 mg Oral Q6H PRN Brooke Mack MD      al mag oxide-diphenhydramine-lidocaine viscous  10 mL Swish & Swallow Q4H PRN Brooke Mack MD      albuterol  2 puff Inhalation Q6H PRN Brooke Mack MD      aluminum-magnesium hydroxide-simethicone  15 mL Oral Q4H PRN Brooke Mack MD      benztropine  1 mg Intramuscular Q4H PRN Max 6/day Brooke Mack MD      benztropine  1 mg Oral Q4H PRN Max 6/day Brooke Mack MD      benztropine  1 mg Oral BID Brooke Mack MD      calcium carbonate  500 mg Oral TID PRN Brooke Mack MD      Diclofenac Sodium  2 g Topical 4x Daily PRN Brooke Mack MD      diphenhydrAMINE  25 mg Oral HS Brooke Mack MD      fenofibrate  145 mg Oral Daily Brooke Mack MD      gabapentin  600 mg Oral 4x Daily Brooke Mack MD      haloperidol  1 mg Oral 4x Daily Brooke Mack MD      haloperidol  5 mg Oral Q6H PRN Brooke Mack MD      hydrOXYzine HCL  25 mg Oral Q6H PRN Max 4/day Brooke Mack MD      hydrOXYzine HCL  50 mg Oral Q6H PRN Max 4/day Brooke Mack MD      lidocaine  1 patch Topical Daily Brooke Mack MD      LORazepam  1 mg Intramuscular Q6H PRN Brooke Mack MD      LORazepam  0 5 mg Oral 4x Daily Brooke Mack MD      magnesium hydroxide  30 mL Oral Daily PRN Brooke Mack MD      methocarbamol  500 mg Oral Q8H PRN Brooke Mack MD      nicotine  1 patch Transdermal Daily Brooke Mack MD      nicotine polacrilex  4 mg Oral Q2H PRN Brooke Mack MD      OLANZapine  2 5 mg Oral Q8H PRN Brooke Mack MD      OLANZapine  5 mg Oral Q3H PRN Brooke Mack MD      OLANZapine  7 5 mg Oral Q3H PRN Brooke Mack MD      OLANZapine  10 mg Intramuscular Q3H PRN Brooke Mack MD      OLANZapine  5 mg Intramuscular Q3H PRN Brooke Mack MD      OLANZapine  10 mg Oral HS Brooke Mack MD      OLANZapine  15 mg Oral Daily Brooke Mack MD      paliperidone palmitate ER  234 mg Intramuscular Q30 Days Brooke Mack MD      pantoprazole  40 mg Oral Early Morning Nubia Cabrales Holli Cross MD      Pramox-PE-Glycerin-Petrolatum  1 application Rectal 4x Daily PRN Jayne Chapman MD      prazosin  1 mg Oral HS Jayne Chapman MD      psyllium  1 packet Oral Daily Jayne Chapman MD      sertraline  100 mg Oral Daily Jayne Chapman MD         Risks / Benefits of Treatment:    Risks, benefits, and possible side effects of medications explained to patient and patient verbalizes understanding and agreement for treatment  Counseling / Coordination of Care:    Patient's progress reviewed with nursing staff  Medications, treatment progress and treatment plan reviewed with patient  Supportive counseling provided to the patient  This note was shared with patient

## 2021-07-05 NOTE — NURSING NOTE
Patient calm cooperative  visible on the unit  Mainatined on safety precautions  Med and meal compliant  No issues reported   Will continue to monitor

## 2021-07-05 NOTE — NURSING NOTE
Bella Washington was visible this shift, pleasant and cooperative  Went out on deck for group, attended wrap up group, and showered  No somatic complaints offered tonight and no outbursts noted   Ate 100% of dinner, made phone calls and had snack

## 2021-07-06 PROCEDURE — 99232 SBSQ HOSP IP/OBS MODERATE 35: CPT | Performed by: PSYCHIATRY & NEUROLOGY

## 2021-07-06 RX ADMIN — OLANZAPINE 15 MG: 5 TABLET, FILM COATED ORAL at 08:21

## 2021-07-06 RX ADMIN — GABAPENTIN 600 MG: 300 CAPSULE ORAL at 08:21

## 2021-07-06 RX ADMIN — HALOPERIDOL 1 MG: 1 TABLET ORAL at 12:55

## 2021-07-06 RX ADMIN — PHENYTOIN 1 MG: 125 SUSPENSION ORAL at 21:09

## 2021-07-06 RX ADMIN — LORAZEPAM 0.5 MG: 0.5 TABLET ORAL at 21:09

## 2021-07-06 RX ADMIN — FENOFIBRATE 145 MG: 145 TABLET ORAL at 08:22

## 2021-07-06 RX ADMIN — OLANZAPINE 10 MG: 10 TABLET, FILM COATED ORAL at 21:09

## 2021-07-06 RX ADMIN — LIDOCAINE 1 PATCH: 50 PATCH CUTANEOUS at 08:34

## 2021-07-06 RX ADMIN — PSYLLIUM HUSK 1 PACKET: 3.4 POWDER ORAL at 09:38

## 2021-07-06 RX ADMIN — SERTRALINE HYDROCHLORIDE 100 MG: 100 TABLET ORAL at 08:21

## 2021-07-06 RX ADMIN — PSYLLIUM HUSK 1 PACKET: 3.4 POWDER ORAL at 08:34

## 2021-07-06 RX ADMIN — HALOPERIDOL 1 MG: 1 TABLET ORAL at 08:21

## 2021-07-06 RX ADMIN — HALOPERIDOL 1 MG: 1 TABLET ORAL at 21:09

## 2021-07-06 RX ADMIN — HALOPERIDOL 1 MG: 1 TABLET ORAL at 17:15

## 2021-07-06 RX ADMIN — LORAZEPAM 0.5 MG: 0.5 TABLET ORAL at 12:54

## 2021-07-06 RX ADMIN — NICOTINE 1 PATCH: 21 PATCH, EXTENDED RELEASE TRANSDERMAL at 08:22

## 2021-07-06 RX ADMIN — GABAPENTIN 600 MG: 300 CAPSULE ORAL at 21:09

## 2021-07-06 RX ADMIN — GABAPENTIN 600 MG: 300 CAPSULE ORAL at 17:15

## 2021-07-06 RX ADMIN — DIPHENHYDRAMINE HCL 25 MG: 25 TABLET ORAL at 21:09

## 2021-07-06 RX ADMIN — GABAPENTIN 600 MG: 300 CAPSULE ORAL at 12:54

## 2021-07-06 RX ADMIN — BENZTROPINE MESYLATE 1 MG: 1 TABLET ORAL at 17:14

## 2021-07-06 RX ADMIN — DICLOFENAC SODIUM 2 G: 10 GEL TOPICAL at 21:09

## 2021-07-06 RX ADMIN — BENZTROPINE MESYLATE 1 MG: 1 TABLET ORAL at 08:22

## 2021-07-06 RX ADMIN — LORAZEPAM 0.5 MG: 0.5 TABLET ORAL at 08:22

## 2021-07-06 RX ADMIN — LORAZEPAM 0.5 MG: 0.5 TABLET ORAL at 17:15

## 2021-07-06 RX ADMIN — PANTOPRAZOLE SODIUM 40 MG: 40 TABLET, DELAYED RELEASE ORAL at 06:08

## 2021-07-06 NOTE — NURSING NOTE
Pt visible on the unit  Attended all of AM groups  Out for meals, eats well  Began yelling this morning as Lidoderm patches applied to her back, cursing because "they were so cold"  No further yelling  Seems to be avoiding "Doris Mcfarland" who has called her multiple times  Doesn't want to speak with this RN about why  Interacts with select peers

## 2021-07-06 NOTE — PLAN OF CARE
Problem: Nutrition/Hydration-ADULT  Goal: Nutrient/Hydration intake appropriate for improving, restoring or maintaining nutritional needs  Description: Monitor and assess patient's nutrition/hydration status for malnutrition  Collaborate with interdisciplinary team and initiate plan and interventions as ordered  Monitor patient's weight and dietary intake as ordered or per policy  Utilize nutrition screening tool and intervene as necessary  Determine patient's food preferences and provide high-protein, high-caloric foods as appropriate       INTERVENTIONS:  - Monitor oral intake, urinary output, labs, and treatment plans  - Assess nutrition and hydration status and recommend course of action  - Evaluate amount of meals eaten  - Assist patient with eating if necessary   - Allow adequate time for meals  - Recommend/ encourage appropriate diets, oral nutritional supplements, and vitamin/mineral supplements  - Order, calculate, and assess calorie counts as needed  - Recommend, monitor, and adjust tube feedings and TPN/PPN based on assessed needs  - Assess need for intravenous fluids  - Provide specific nutrition/hydration education as appropriate  - Include patient/family/caregiver in decisions related to nutrition  Outcome: Progressing     Problem: Alteration in Thoughts and Perception  Goal: Treatment Goal: Gain control of psychotic behaviors/thinking, reduce/eliminate presenting symptoms and demonstrate improved reality functioning upon discharge  Outcome: Progressing  Goal: Verbalize thoughts and feelings  Description: Interventions:  - Promote a nonjudgmental and trusting relationship with the patient through active listening and therapeutic communication  - Assess patient's level of functioning, behavior and potential for risk  - Engage patient in 1 on 1 interactions  - Encourage patient to express fears, feelings, frustrations, and discuss symptoms    - [place] patient to reality, help patient recognize reality-based thinking   - Administer medications as ordered and assess for potential side effects  - Provide the patient education related to the signs and symptoms of the illness and desired effects of prescribed medications  Outcome: Progressing  Goal: Refrain from acting on delusional thinking/internal stimuli  Description: Interventions:  - Monitor patient closely, per order   - Utilize least restrictive measures   - Set reasonable limits, give positive feedback for acceptable   - Administer medications as ordered and monitor of potential side effects  Outcome: Progressing  Goal: Agree to be compliant with medication regime, as prescribed and report medication side effects  Description: Interventions:  - Offer appropriate PRN medication and supervise ingestion; conduct AIMS, as needed   Outcome: Progressing  Goal: Attend and participate in unit activities, including therapeutic, recreational, and educational groups  Description: Interventions:  -Encourage Visitation and family involvement in care  Outcome: Progressing  Goal: Recognize dysfunctional thoughts, communicate reality-based thoughts at the time of discharge  Description: Interventions:  - Provide medication and psycho-education to assist patient in compliance and developing insight into his/her illness   Outcome: Progressing  Goal: Complete daily ADLs, including personal hygiene independently, as able  Description: Interventions:  - Observe, teach, and assist patient with ADLS  - Monitor and promote a balance of rest/activity, with adequate nutrition and elimination   Outcome: Progressing     Problem: Ineffective Coping  Goal: Cooperates with admission process  Description: Interventions:   - Complete admission process  Outcome: Progressing  Goal: Identifies ineffective coping skills  Outcome: Progressing  Goal: Identifies healthy coping skills  Outcome: Progressing  Goal: Demonstrates healthy coping skills  Outcome: Progressing  Goal: Participates in unit activities  Description: Interventions:  - Provide therapeutic environment   - Provide required programming   - Redirect inappropriate behaviors   Outcome: Progressing  Goal: Patient/Family participate in treatment and DC plans  Description: Interventions:  - Provide therapeutic environment  Outcome: Progressing  Goal: Patient/Family verbalizes awareness of resources  Outcome: Progressing  Goal: Understands least restrictive measures  Description: Interventions:  - Utilize least restrictive behavior  Outcome: Progressing  Goal: Free from restraint events  Description: - Utilize least restrictive measures   - Provide behavioral interventions   - Redirect inappropriate behaviors   Outcome: Progressing     Problem: Risk for Self Injury/Neglect  Goal: Treatment Goal: Remain safe during length of stay, learn and adopt new coping skills, and be free of self-injurious ideation, impulses and acts at the time of discharge  Outcome: Progressing  Goal: Verbalize thoughts and feelings  Description: Interventions:  - Assess and re-assess patient's lethality and potential for self-injury  - Engage patient in 1:1 interactions, daily, for a minimum of 15 minutes  - Encourage patient to express feelings, fears, frustrations, hopes  - Establish rapport/trust with patient   Outcome: Progressing  Goal: Refrain from harming self  Description: Interventions:  - Monitor patient closely, per order  - Develop a trusting relationship  - Supervise medication ingestion, monitor effects and side effects   Outcome: Progressing  Goal: Attend and participate in unit activities, including therapeutic, recreational, and educational groups  Description: Interventions:  - Provide therapeutic and educational activities daily, encourage attendance and participation, and document same in the medical record  - Obtain collateral information, encourage visitation and family involvement in care   Outcome: Progressing  Goal: Recognize maladaptive responses and adopt new coping mechanisms  Outcome: Progressing  Goal: Complete daily ADLs, including personal hygiene independently, as able  Description: Interventions:  - Observe, teach, and assist patient with ADLS  - Monitor and promote a balance of rest/activity, with adequate nutrition and elimination  Outcome: Progressing     Problem: Depression  Goal: Treatment Goal: Demonstrate behavioral control of depressive symptoms, verbalize feelings of improved mood/affect, and adopt new coping skills prior to discharge  Outcome: Progressing  Goal: Verbalize thoughts and feelings  Description: Interventions:  - Assess and re-assess patient's level of risk   - Engage patient in 1:1 interactions, daily, for a minimum of 15 minutes   - Encourage patient to express feelings, fears, frustrations, hopes   Outcome: Progressing  Goal: Refrain from harming self  Description: Interventions:  - Monitor patient closely, per order   - Supervise medication ingestion, monitor effects and side effects   Outcome: Progressing  Goal: Refrain from isolation  Description: Interventions:  - Develop a trusting relationship   - Encourage socialization   Outcome: Progressing  Goal: Refrain from self-neglect  Outcome: Progressing  Goal: Attend and participate in unit activities, including therapeutic, recreational, and educational groups  Description: Interventions:  - Provide therapeutic and educational activities daily, encourage attendance and participation, and document same in the medical record   Outcome: Progressing  Goal: Complete daily ADLs, including personal hygiene independently, as able  Description: Interventions:  - Observe, teach, and assist patient with ADLS  -  Monitor and promote a balance of rest/activity, with adequate nutrition and elimination   Outcome: Progressing     Problem: Anxiety  Goal: Anxiety is at manageable level  Description: Interventions:  - Assess and monitor patient's anxiety level     - Monitor for signs and symptoms (heart palpitations, chest pain, shortness of breath, headaches, nausea, feeling jumpy, restlessness, irritable, apprehensive)  - Collaborate with interdisciplinary team and initiate plan and interventions as ordered    - [place] patient to unit/surroundings  - Explain treatment plan  - Encourage participation in care  - Encourage verbalization of concerns/fears  - Identify coping mechanisms  - Assist in developing anxiety-reducing skills  - Administer/offer alternative therapies  - Limit or eliminate stimulants  Outcome: Progressing     Problem: Risk for Violence/Aggression Toward Others  Goal: Treatment Goal: Refrain from acts of violence/aggression during length of stay, and demonstrate improved impulse control at the time of discharge  Outcome: Progressing  Goal: Verbalize thoughts and feelings  Description: Interventions:  - Assess and re-assess patient's level of risk, every waking shift  - Engage patient in 1:1 interactions, daily, for a minimum of 15 minutes   - Allow patient to express feelings and frustrations in a safe and non-threatening manner   - Establish rapport/trust with patient   Outcome: Progressing  Goal: Refrain from harming others  Outcome: Progressing  Goal: Refrain from destructive acts on the environment or property  Outcome: Progressing  Goal: Control angry outbursts  Description: Interventions:  - Monitor patient closely, per order  - Ensure early verbal de-escalation  - Monitor prn medication needs  - Set reasonable/therapeutic limits, outline behavioral expectations, and consequences   - Provide a non-threatening milieu, utilizing the least restrictive interventions   Outcome: Progressing  Goal: Attend and participate in unit activities, including therapeutic, recreational, and educational groups  Description: Interventions:  - Provide therapeutic and educational activities daily, encourage attendance and participation, and document same in the medical record   Outcome: Progressing  Goal: Identify appropriate positive anger management techniques  Description: Interventions:  - Offer anger management and coping skills groups   - Staff will provide positive feedback for appropriate anger control  Outcome: Progressing     Problem: Alteration in Orientation  Goal: Treatment Goal: Demonstrate a reduction of confusion and improved orientation to person, place, time and/or situation upon discharge, according to optimum baseline/ability  Outcome: Progressing  Goal: Interact with staff daily  Description: Interventions:  - Assess and re-assess patient's level of orientation  - Engage patient in 1 on 1 interactions, daily, for a minimum of 15 minutes   - Establish rapport/trust with patient   Outcome: Progressing  Goal: Express concerns related to confused thinking related to:  Description: Interventions:  - Encourage patient to express feelings, fears, frustrations, hopes  - Assign consistent caregivers   - [place]/re-orient patient as needed  - Allow comfort items, as appropriate  - Provide visual cues, signs, etc    Outcome: Progressing  Goal: Allow medical examinations, as recommended  Description: Interventions:  - Provide physical/neurological exams and/or referrals, per provider   Outcome: Progressing  Goal: Cooperate with recommended testing/procedures  Description: Interventions:  - Determine need for ancillary testing  - Observe for mental status changes  - Implement falls/precaution protocol   Outcome: Progressing  Goal: Attend and participate in unit activities, including therapeutic, recreational, and educational groups  Description: Interventions:  - Provide therapeutic and educational activities daily, encourage attendance and participation, and document same in the medical record   - Provide appropriate opportunities for reminiscence   - Provide a consistent daily routine   - Encourage family contact/visitation   Outcome: Progressing  Goal: Complete daily ADLs, including personal hygiene independently, as able  Description: Interventions:  - Observe, teach, and assist patient with ADLS  Outcome: Progressing     Problem: Individualized Interventions  Goal: Patient will verbalize appropriate use of telephone within 5 days  Description: Interventions:  - Treatment team to determine use of supervised phone privileges   Outcome: Progressing  Goal: Patient will verbalize need for hospitalization and will no longer attempt elopement within 5 days  Description: Interventions:  - Ongoing education to help patient understand need for hospitalization  Outcome: Progressing  Goal: Patient will recognize inappropriate behaviors and develop alternative behaviors within 5 days  Description: Interventions:  - Patient in collaboration with Treatment Team will develop a behavior management plan to help identify effective coping skills to deal with stressors  Outcome: Progressing     Problem: DISCHARGE PLANNING - CARE MANAGEMENT  Goal: Discharge to post-acute care or home with appropriate resources  Description: INTERVENTIONS:  - Conduct assessment to determine patient/family and health care team treatment goals, and need for post-acute services based on payer coverage, community resources, and patient preferences, and barriers to discharge  - Address psychosocial, clinical, and financial barriers to discharge as identified in assessment in conjunction with the patient/family and health care team  - Arrange appropriate level of post-acute services according to patients   needs and preference and payer coverage in collaboration with the physician and health care team  - Communicate with and update the patient/family, physician, and health care team regarding progress on the discharge plan  - Arrange appropriate transportation to post-acute venues  Outcome: Progressing

## 2021-07-06 NOTE — PLAN OF CARE
Problem: Alteration in Thoughts and Perception  Goal: Verbalize thoughts and feelings  Description: Interventions:  - Promote a nonjudgmental and trusting relationship with the patient through active listening and therapeutic communication  - Assess patient's level of functioning, behavior and potential for risk  - Engage patient in 1 on 1 interactions  - Encourage patient to express fears, feelings, frustrations, and discuss symptoms    - [place] patient to reality, help patient recognize reality-based thinking   - Administer medications as ordered and assess for potential side effects  - Provide the patient education related to the signs and symptoms of the illness and desired effects of prescribed medications  7/6/2021 1625 by Surya Atkinson  Outcome: Progressing  7/6/2021 1253 by Surya Atkinson  Outcome: Progressing  Goal: Attend and participate in unit activities, including therapeutic, recreational, and educational groups  Description: Interventions:  -Encourage Visitation and family involvement in care  Outcome: Progressing     Problem: Ineffective Coping  Goal: Participates in unit activities  Description: Interventions:  - Provide therapeutic environment   - Provide required programming   - Redirect inappropriate behaviors   Outcome: Progressing  Goal: Patient/Family participate in treatment and DC plans  Description: Interventions:  - Provide therapeutic environment  Outcome: Progressing    Writer met with Patient face to face and began working on community prep goals  Patient completed her part of a [employer] application and her part of an application for financial assistance to the local [facility] in her area  Writer also supplied Patient with an article on using appropriate recovery language for Patient tends to use inappropriate verbiage towards herself in which she creates her own stigma in doing so   Negrita Sapp will also use this topic in upcoming group format to get a better understanding of how Patient views herself can be harmful to her self worth and self esteem

## 2021-07-06 NOTE — PROGRESS NOTES
Psychiatry Progress Note Paulie Hill [age] y o  female MRN: [id number]  Unit/Bed#: [facility] 313-32 Encounter: [id number]  Code Status: Level 1 - Full Code    PCP: Naty Calvin DO    Date of Admission:  4/7/2021 1435   Date of Service:  07/06/21    Patient Active Problem List   Diagnosis    Schizoaffective disorder, bipolar type ([facility] )    Uncomplicated alcohol dependence ([facility] )    Suicidal behavior    LYNN (generalized anxiety disorder)    Slow transit constipation    Deficiency of vitamin B    Degenerative disc disease, lumbar    Post-traumatic stress disorder, chronic    Lower extremity weakness    Generalized abdominal pain    Non-intractable vomiting    Medical clearance for psychiatric admission    Carbuncle    Alcohol dependence with unspecified alcohol-induced disorder ([facility] )    Mild intermittent asthma without complication    Tobacco abuse    Ear problem, bilateral    Gastroesophageal reflux disease    Right foot pain    Ear problem, right     Review of systems:      unremarkable  Diagnosis:             Schizoaffective bipolar, alcohol use disorder in early remission in controlled environment  Assessment   Overall Status:          Improving a step for occasional loud outbursts but  Redirectable and attending most of the groups   Certification Statement: The patient will continue to require additional inpatient hospital stay due to ongoing mood swings paranoia   Acceptance by patient:  accepting    Hopefulness in recovery:  Living in a group home   Understanding of medications: has good understanding   Involved in reintegration process:  Adjusting to the unit   Trusting in relationship with psychiatrist:  trusting   Justification for dual anti-psychotics: due to lack of response to single antipsychotics   Side effects from treatment: none    Medication changes    none today  Medication Education;  Risks and S/E and precautions of all meds given to patient and she did verbalize an understanding   Non-pharmacological treatments   Continue with individual, group, milieu and occupational therapy using recovery principles and psycho-education about accepting illness and the need for treatment  Safety   Safety and communication plan established to target dynamic risk factors discussed above  Discharge Plan     most likely to a group home with the act team again     Interval Progress     Doing better with less frequent episodes of yelling  Continues to be delusional about its people stalking her than someone living upstairs and talking will through the vents on the ceiling and still questions if staff are still hipnotiizing her  Friendly pleasant polite but tends to become suspicious at times and questions the motives of staff  Overall doing better       Sleep:                         good   appetite:                       good   compliance with medications :        compliance   Side  Effects:                                   None reported   significant events:                            Not yelling recently and showing some improvement   group attendance:                            Attending most of the groups, 5/5 groups    Mental Status Exam  Appearance: age appropriate, casually dressed, looks older than stated age, underweight  Attended team meeting wearing a facial mask    Behavior: normal, pleasant, cooperative, appears anxious, mildly anxious,  more friendly today and not confrontational     Speech: fluent, clear, coherent, increased rate, pressured, hypertalkative,   Circumstantial pressured    Mood: depressed, anxious, euphoric  Affect: labile, reactive, slightly brighter    Thought Process: normal abstract reasoning, less prominent, tends to be pressured and circumstantial and preoccupied perseverating on discharge    Thought Content: some paranoia, ideas of reference, but does appear as if paranoid    No current suicidal homicidal thoughts intent or plans verbalized  No phobias obsessions compulsions or distorted body perceptions elicited  Also believes TV is sending her messages from movie stars checking her if she is good or bad and threatening to get the feds to go after her    Continues to believe some staff do not like her   Perceptual Disturbances: no auditory hallucinations, no visual hallucinations, denies auditory hallucinations when asked, does not appear responding to internal stimuli, no voices reported today     No vices today   Hx Risk Factors: chronic mood disorder, chronic psychotic symptoms, alcohol use, limted insight  Sensorium:        Oriented to 3 spheres and to situation   Cognition: recent and remote memory grossly intact  Consciousness: alert and awake  Attention: attention span and concentration are age appropriate  Intellect: appears to be of average intelligence  Insight: improving  Judgement: improving  Motor Activity: no abnormal movements     Vitals  Temp:  [98 5 °F (36 9 °C)-98 7 °F (37 1 °C)] 98 7 °F (37 1 °C)  HR:  [78-88] 82  Resp:  [16-18] 16  BP: ()/(61-74) 110/64  No intake or output data in the 24 hours ending 07/06/21 0535    Lab Results: No New Labs Available For Today     Current Facility-Administered Medications   Medication Dose Route Frequency Provider Last Rate    acetaminophen  650 mg Oral Q6H PRN Debby Link MD      acetaminophen  650 mg Oral Q4H PRN Debby Link MD      acetaminophen  975 mg Oral Q6H PRN Debby Link MD      al mag oxide-diphenhydramine-lidocaine viscous  10 mL Swish & Swallow Q4H PRN Debby Link MD      albuterol  2 puff Inhalation Q6H PRN Debby Link MD      aluminum-magnesium hydroxide-simethicone  15 mL Oral Q4H PRN Debby Link MD      benztropine  1 mg Intramuscular Q4H PRN Max 6/day Debby Link MD      benztropine  1 mg Oral Q4H PRN Max 6/day Debby Link MD      benztropine  1 mg Oral BID Debby Link MD      calcium carbonate  500 mg Oral TID PRN Machelle [place] Lavern Recio MD      Diclofenac Sodium  2 g Topical 4x Daily PRN Soledad Del Rio MD      diphenhydrAMINE  25 mg Oral HS Soledad Del Rio MD      fenofibrate  145 mg Oral Daily Soledad Del Rio MD      gabapentin  600 mg Oral 4x Daily Soledad Del Rio MD      haloperidol  1 mg Oral 4x Daily Soledad Del Rio MD      haloperidol  5 mg Oral Q6H PRN Soledad Del Rio MD      hydrOXYzine HCL  25 mg Oral Q6H PRN Max 4/day Soledad Del Rio MD      hydrOXYzine HCL  50 mg Oral Q6H PRN Max 4/day Soledad Del Rio MD      lidocaine  1 patch Topical Daily Soledad Del Rio MD      LORazepam  1 mg Intramuscular Q6H PRN Soledad Del Rio MD      LORazepam  0 5 mg Oral 4x Daily Soledad Del Rio MD      magnesium hydroxide  30 mL Oral Daily PRN Soledad Del Rio MD      methocarbamol  500 mg Oral Q8H PRN Soledad Del Rio MD      nicotine  1 patch Transdermal Daily Soledad Del Rio MD      nicotine polacrilex  4 mg Oral Q2H PRN Soledad Del Rio MD      OLANZapine  2 5 mg Oral Q8H PRN Soledad Del Rio MD      OLANZapine  5 mg Oral Q3H PRN Soledad Del Rio MD      OLANZapine  7 5 mg Oral Q3H PRN Soledad Del Rio MD      OLANZapine  10 mg Intramuscular Q3H PRN Soledad Del Rio MD      OLANZapine  5 mg Intramuscular Q3H PRN Soledad Del Rio MD      OLANZapine  10 mg Oral HS Soledad Del Rio MD      OLANZapine  15 mg Oral Daily Soledad Del Rio MD      paliperidone palmitate ER  234 mg Intramuscular Q30 Days Soledad Del Rio MD      pantoprazole  40 mg Oral Early Morning Soledad Del Rio MD      Pramox-PE-Glycerin-Petrolatum  1 application Rectal 4x Daily PRN Soledad Del Rio MD      prazosin  1 mg Oral HS Soledad Del Rio MD      psyllium  1 packet Oral Daily Soledad Del Rio MD      sertraline  100 mg Oral Daily Soledad Del Rio MD         Counseling / Coordination of Care: Total floor / unit time spent today 15 minutes  Greater than 50% of total time was spent with the patient and / or family counseling and / or somewhat receptive to supportive listening and teaching positive coping skills to deal with symptom mangement  Patient's Rights, confidentiality and exceptions to confidentiality, use of automated medical record, [facility] staff access to medical record, and consent to treatment reviewed  This note was  not shared with the patient because it might further aggravate her psychiatric condition  This note has been dictated

## 2021-07-06 NOTE — PLAN OF CARE
Problem: Alteration in Thoughts and Perception  Goal: Verbalize thoughts and feelings  Description: Interventions:  - Promote a nonjudgmental and trusting relationship with the patient through active listening and therapeutic communication  - Assess patient's level of functioning, behavior and potential for risk  - Engage patient in 1 on 1 interactions  - Encourage patient to express fears, feelings, frustrations, and discuss symptoms    - [place] patient to reality, help patient recognize reality-based thinking   - Administer medications as ordered and assess for potential side effects  - Provide the patient education related to the signs and symptoms of the illness and desired effects of prescribed medications  Outcome: Progressing     Problem: Ineffective Coping  Goal: Participates in unit activities  Description: Interventions:  - Provide therapeutic environment   - Provide required programming   - Redirect inappropriate behaviors   Outcome: Progressing    Patient completed Goal Card for the week of 7/6/21    Goals: Interview with Angeline Boyd

## 2021-07-06 NOTE — PROGRESS NOTES
RBTVO Ender Veronica PA-C, discontinue TwinRX (HepA/HepB) vaccine and give Engerix (Hep B) vaccine 20 mcg/ml IM today instead

## 2021-07-06 NOTE — PROGRESS NOTES
07/06/21 1048   Team Meeting   Meeting Type Daily Rounds   Initial Conference Date 07/06/21   Patient/Family Present   Patient Present No   Patient's Family Present No       Daily Rounds  Participating: Dr Yesica Medina MD, Audrey Engle LPN, Marni Gowers LSW, Vimal Pickard, Norma Hinkle CPS, Anastasia Feliz RN    Case reviewed  Cooperative  No periods of yelling out over weekend  Did use the phone a lot to talk with family / friends

## 2021-07-06 NOTE — PROGRESS NOTES
07/06/21 1051   Team Meeting   Initial Conference Date 07/06/21   Next Conference Date 07/13/21   Team Members Present   Team Members Present Physician;; Other (Discipline and Name)   Physician Team Member Dr Giovani Leavitt MD   Social Work Team Member [facility]   Other (Discipline and Name) CMP (absent); Mohsen Elias CPS   Patient/Family Present   Patient Present Yes   Patient's Family Present No       Summary: Pt presented for her treatment team meeting this morning with a completed self assessment  Pt reported she had a "good weekend" and expressed that she has been feeling much better  She reported a day last week she really struggled "I was hearing voices" but since has not experienced this and has had a good mood  Pt did not have any yelling episodes over weekend either and shared she is proud of this  We discussed her interview with [facility] [address] [place] [address] staff today at [phone], pt encouraged again to consider / write down questions she'd like to ask them  Pt has been medication and meal compliant, reported pain in her ankles to her nurse, and has been focusing on her recovery and having a healthier diet and including exercise into her routine  She identifies using positive coping skills of exercising, reading, journaling and good self care by minding her hygeine and going over positive affirmations  Pt will follow up with  on aftercare appointments (PCP) to address outstanding vaccinations and also set up time to go to bank if possible, to take care of some of her bills

## 2021-07-07 PROCEDURE — 90746 HEPB VACCINE 3 DOSE ADULT IM: CPT | Performed by: PHYSICIAN ASSISTANT

## 2021-07-07 PROCEDURE — 99232 SBSQ HOSP IP/OBS MODERATE 35: CPT | Performed by: PHYSICIAN ASSISTANT

## 2021-07-07 RX ADMIN — LIDOCAINE 1 PATCH: 50 PATCH CUTANEOUS at 08:29

## 2021-07-07 RX ADMIN — HALOPERIDOL 1 MG: 1 TABLET ORAL at 08:30

## 2021-07-07 RX ADMIN — PANTOPRAZOLE SODIUM 40 MG: 40 TABLET, DELAYED RELEASE ORAL at 06:04

## 2021-07-07 RX ADMIN — HALOPERIDOL 1 MG: 1 TABLET ORAL at 12:20

## 2021-07-07 RX ADMIN — PSYLLIUM HUSK 1 PACKET: 3.4 POWDER ORAL at 08:31

## 2021-07-07 RX ADMIN — OLANZAPINE 15 MG: 5 TABLET, FILM COATED ORAL at 09:00

## 2021-07-07 RX ADMIN — LORAZEPAM 0.5 MG: 0.5 TABLET ORAL at 17:04

## 2021-07-07 RX ADMIN — LORAZEPAM 0.5 MG: 0.5 TABLET ORAL at 21:13

## 2021-07-07 RX ADMIN — NICOTINE 1 PATCH: 21 PATCH, EXTENDED RELEASE TRANSDERMAL at 09:00

## 2021-07-07 RX ADMIN — FENOFIBRATE 145 MG: 145 TABLET ORAL at 08:30

## 2021-07-07 RX ADMIN — GABAPENTIN 600 MG: 300 CAPSULE ORAL at 17:05

## 2021-07-07 RX ADMIN — LORAZEPAM 0.5 MG: 0.5 TABLET ORAL at 08:30

## 2021-07-07 RX ADMIN — LORAZEPAM 0.5 MG: 0.5 TABLET ORAL at 12:20

## 2021-07-07 RX ADMIN — BENZTROPINE MESYLATE 1 MG: 1 TABLET ORAL at 08:30

## 2021-07-07 RX ADMIN — GABAPENTIN 600 MG: 300 CAPSULE ORAL at 08:30

## 2021-07-07 RX ADMIN — DIPHENHYDRAMINE HCL 25 MG: 25 TABLET ORAL at 21:13

## 2021-07-07 RX ADMIN — SERTRALINE HYDROCHLORIDE 100 MG: 100 TABLET ORAL at 08:30

## 2021-07-07 RX ADMIN — HEPATITIS B VACCINE (RECOMBINANT) 1 ML: 20 INJECTION, SUSPENSION INTRAMUSCULAR at 06:26

## 2021-07-07 RX ADMIN — BENZTROPINE MESYLATE 1 MG: 1 TABLET ORAL at 17:05

## 2021-07-07 RX ADMIN — GABAPENTIN 600 MG: 300 CAPSULE ORAL at 21:13

## 2021-07-07 RX ADMIN — OLANZAPINE 10 MG: 10 TABLET, FILM COATED ORAL at 21:13

## 2021-07-07 RX ADMIN — DICLOFENAC SODIUM 2 G: 10 GEL TOPICAL at 08:31

## 2021-07-07 RX ADMIN — PHENYTOIN 1 MG: 125 SUSPENSION ORAL at 21:13

## 2021-07-07 RX ADMIN — HALOPERIDOL 1 MG: 1 TABLET ORAL at 17:05

## 2021-07-07 RX ADMIN — GABAPENTIN 600 MG: 300 CAPSULE ORAL at 12:20

## 2021-07-07 RX ADMIN — HALOPERIDOL 1 MG: 1 TABLET ORAL at 21:13

## 2021-07-07 NOTE — PROGRESS NOTES
Progress Note - Behavioral Health   Elisa Denia 49 y o  female MRN: [id number]  Unit/Bed#: [facility] 112-01 Encounter: [id number]    Assessment/Plan   Principal Problem:    Schizoaffective disorder, bipolar type ([address] )  Active Problems:    Post-traumatic stress disorder, chronic    Medical clearance for psychiatric admission    Mild intermittent asthma without complication    Ear problem, bilateral    Gastroesophageal reflux disease    Right foot pain    Ear problem, right      Subjective: Patient was seen, chart reviewed and case discussed with team   Patient cooperative and seen out a groups  States she is doing well  Did have some congestion but denies wanting any type of medication to help this  Did appear mildly anxious  States she is upset that she is not going to a group home yet  Are reports of paranoia  States her depression is more under control and denied suicidal thoughts  Denied hallucinations  Does have somewhat pressured speech and circumstantial thought process  Are reports of ideas of reference  Denied PTSD related symptoms today  Medication compliant    Appears to be tolerating medications well without serious side effects      Psychiatric Review of Systems:  Behavior over the last 24 hours:  unchanged  Sleep: normal  Appetite: normal  Medication side effects: No  ROS: no complaints, all others negative    Current Medications:  Current Facility-Administered Medications   Medication Dose Route Frequency    acetaminophen (TYLENOL) tablet 650 mg  650 mg Oral Q6H PRN    acetaminophen (TYLENOL) tablet 650 mg  650 mg Oral Q4H PRN    acetaminophen (TYLENOL) tablet 975 mg  975 mg Oral Q6H PRN    al mag oxide-diphenhydramine-lidocaine viscous (MAGIC MOUTHWASH) suspension 10 mL  10 mL Swish & Swallow Q4H PRN    albuterol (PROVENTIL HFA,VENTOLIN HFA) inhaler 2 puff  2 puff Inhalation Q6H PRN    aluminum-magnesium hydroxide-simethicone (MYLANTA) oral suspension 15 mL  15 mL Oral Q4H PRN    benztropine (COGENTIN) injection 1 mg  1 mg Intramuscular Q4H PRN Max 6/day    benztropine (COGENTIN) tablet 1 mg  1 mg Oral Q4H PRN Max 6/day    benztropine (COGENTIN) tablet 1 mg  1 mg Oral BID    calcium carbonate (TUMS) chewable tablet 500 mg  500 mg Oral TID PRN    Diclofenac Sodium (VOLTAREN) 1 % topical gel 2 g  2 g Topical 4x Daily PRN    diphenhydrAMINE (BENADRYL) tablet 25 mg  25 mg Oral HS    fenofibrate (TRICOR) tablet 145 mg  145 mg Oral Daily    gabapentin (NEURONTIN) capsule 600 mg  600 mg Oral 4x Daily    haloperidol (HALDOL) tablet 1 mg  1 mg Oral 4x Daily    haloperidol (HALDOL) tablet 5 mg  5 mg Oral Q6H PRN    hydrOXYzine HCL (ATARAX) tablet 25 mg  25 mg Oral Q6H PRN Max 4/day    hydrOXYzine HCL (ATARAX) tablet 50 mg  50 mg Oral Q6H PRN Max 4/day    lidocaine (LIDODERM) 5 % patch 1 patch  1 patch Topical Daily    LORazepam (ATIVAN) injection 1 mg  1 mg Intramuscular Q6H PRN    LORazepam (ATIVAN) tablet 0 5 mg  0 5 mg Oral 4x Daily    magnesium hydroxide (MILK OF MAGNESIA) oral suspension 30 mL  30 mL Oral Daily PRN    methocarbamol (ROBAXIN) tablet 500 mg  500 mg Oral Q8H PRN    nicotine (NICODERM CQ) 21 mg/24 hr TD 24 hr patch 1 patch  1 patch Transdermal Daily    nicotine polacrilex (NICORETTE) gum 4 mg  4 mg Oral Q2H PRN    OLANZapine (ZyPREXA ZYDIS) dispersible tablet 2 5 mg  2 5 mg Oral Q8H PRN    OLANZapine (ZyPREXA ZYDIS) dispersible tablet 5 mg  5 mg Oral Q3H PRN    OLANZapine (ZyPREXA ZYDIS) dispersible tablet 7 5 mg  7 5 mg Oral Q3H PRN    OLANZapine (ZyPREXA) IM injection 10 mg  10 mg Intramuscular Q3H PRN    OLANZapine (ZyPREXA) IM injection 5 mg  5 mg Intramuscular Q3H PRN    OLANZapine (ZyPREXA) tablet 10 mg  10 mg Oral HS    OLANZapine (ZyPREXA) tablet 15 mg  15 mg Oral Daily    paliperidone palmitate ER (INVEGA) IM injection 234 mg  234 mg Intramuscular Q30 Days    pantoprazole (PROTONIX) EC tablet 40 mg  40 mg Oral Early Morning    Pramox-PE-Glycerin-Petrolatum (PREPARATION H MAX) 1-0 25-14 4-15 % rectal cream 1 application  1 application Rectal 4x Daily PRN    prazosin (MINIPRESS) capsule 1 mg  1 mg Oral HS    psyllium (METAMUCIL) 1 packet  1 packet Oral Daily    sertraline (ZOLOFT) tablet 100 mg  100 mg Oral Daily       Behavioral Health Medications: all current active meds have been reviewed and continue current psychiatric medications  Vitals:  Vitals:    07/07/21 0707   BP: 107/70   Pulse: 82   Resp: 19   Temp: 97 5 °F (36 4 °C)   SpO2:        Laboratory results:    I have personally reviewed all pertinent laboratory/tests results    Most Recent Labs:   Lab Results   Component Value Date    WBC 6 80 04/09/2021    RBC 4 32 04/09/2021    HGB 13 0 04/09/2021    HCT 39 5 04/09/2021     04/09/2021    RDW 14 3 04/09/2021    NEUTROABS 7 10 (H) 03/16/2021    SODIUM 139 05/03/2021    K 4 4 05/03/2021     05/03/2021    CO2 30 05/03/2021    BUN 14 05/03/2021    CREATININE 0 61 05/03/2021    GLUC 94 05/03/2021    GLUF 94 05/03/2021    CALCIUM 9 8 05/03/2021    AST 25 05/03/2021    ALT 18 05/03/2021    ALKPHOS 56 05/03/2021    TP 7 2 05/03/2021    ALB 4 2 05/03/2021    TBILI 0 25 05/03/2021    CHOLESTEROL 300 (H) 03/31/2021    HDL 57 03/31/2021    TRIG 658 (H) 03/31/2021    LDLCALC  03/31/2021      Comment:      Calculated LDL invalid, triglycerides >400 mg/dl    NONHDLC 147 08/23/2020    AMMONIA 28 10/27/2017    EWR9BNXKMSZS 3 810 04/09/2021    FREET4 0 89 03/24/2016    PREGUR negative 03/16/2021    PREGSERUM Negative 10/21/2019    HCG <2 00 04/10/2014    RPR Non-Reactive 03/31/2021    HGBA1C 5 0 08/06/2019    EAG 97 08/06/2019       Mental Status Evaluation:  Appearance:  casually dressed   Behavior:  Guarded but cooperative   Speech:  pressured   Mood:  anxious   Affect:  mood-congruent   Language Appropriate   Thought Process:  circumstantial   Thought Content:  Ideas of reference   Perceptual Disturbances: None   Risk Potential: Denied SI/HI  Potential for aggression no   Sensorium:  person, place and time/date   Cognition:  recent and remote memory grossly intact   Consciousness:  alert and awake    Attention: attention span appeared shorter than expected for age   Insight:  partial   Judgment: partial   Gait/Station: normal gait/station and normal balance   Motor Activity: no abnormal movements     Progress Toward Goals:  Unchanged    Recommended Treatment: Continue with pharmacotherapy, group therapy, milieu therapy and occupational therapy  1  Continue current medications  2  Awaiting placement at this time    Risks, benefits and possible side effects of Medications:   Risks, benefits, and possible side effects of medications explained to patient and patient verbalizes understanding

## 2021-07-07 NOTE — CASE MANAGEMENT
Coordinating with [facility], confirmed they can bring patient to [facility] (Flori Lamar [address] [place] [address]) tomorrow for a tour and then bring her back to [facility])  Plan for Maricel Paez to pick her up at 11AM from [facility]  Pt updated  Unit manager informed so an MHT/staff can accompany in addition to [address]  E-mail sent to contact at [facility] to confirm that time frame works for their staff  Physician also updated so he can place order for her temporary pass

## 2021-07-07 NOTE — NURSING NOTE
Alert, cooperative and visible intermittently  No SI or HI noted  Denies depression, anxiety and pain  This am pt noted talking to self while in bathroom in room  Attended electronic and IMR groups  Consumed 100% of breakfast and 100% of lunch  Took all medication without prompting  Maintained on safe precautions without incident   Will continue to monitor progress and recovery program
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
## 2021-07-07 NOTE — NURSING NOTE
Hep B vaccine administered in L deltoid  No issues noted   Pt informed that arm may be a little sore in next 1-2 days and to inform staff

## 2021-07-07 NOTE — PLAN OF CARE
Problem: Alteration in Thoughts and Perception  Goal: Verbalize thoughts and feelings  Description: Interventions:  - Promote a nonjudgmental and trusting relationship with the patient through active listening and therapeutic communication  - Assess patient's level of functioning, behavior and potential for risk  - Engage patient in 1 on 1 interactions  - Encourage patient to express fears, feelings, frustrations, and discuss symptoms    - [place] patient to reality, help patient recognize reality-based thinking   - Administer medications as ordered and assess for potential side effects  - Provide the patient education related to the signs and symptoms of the illness and desired effects of prescribed medications  Outcome: Progressing  Goal: Refrain from acting on delusional thinking/internal stimuli  Description: Interventions:  - Monitor patient closely, per order   - Utilize least restrictive measures   - Set reasonable limits, give positive feedback for acceptable   - Administer medications as ordered and monitor of potential side effects  Outcome: Progressing  Goal: Agree to be compliant with medication regime, as prescribed and report medication side effects  Description: Interventions:  - Offer appropriate PRN medication and supervise ingestion; conduct AIMS, as needed   Outcome: Progressing

## 2021-07-07 NOTE — NURSING NOTE
Margaret Daly has been visible, pleasant, and cooperative  Patient had no outburst or behaviors this shift  Patient social with select peers  Denies all psych symptoms  She is medication and meal compliant  Attended therapeutic activity and wrap up group  Consumed 100% of dinner  Continue to monitor

## 2021-07-08 PROCEDURE — 99232 SBSQ HOSP IP/OBS MODERATE 35: CPT | Performed by: PSYCHIATRY & NEUROLOGY

## 2021-07-08 RX ADMIN — OLANZAPINE 15 MG: 5 TABLET, FILM COATED ORAL at 08:39

## 2021-07-08 RX ADMIN — NICOTINE 1 PATCH: 21 PATCH, EXTENDED RELEASE TRANSDERMAL at 08:42

## 2021-07-08 RX ADMIN — HALOPERIDOL 1 MG: 1 TABLET ORAL at 17:06

## 2021-07-08 RX ADMIN — GABAPENTIN 600 MG: 300 CAPSULE ORAL at 21:20

## 2021-07-08 RX ADMIN — GABAPENTIN 600 MG: 300 CAPSULE ORAL at 11:03

## 2021-07-08 RX ADMIN — SERTRALINE HYDROCHLORIDE 100 MG: 100 TABLET ORAL at 08:39

## 2021-07-08 RX ADMIN — LIDOCAINE 1 PATCH: 50 PATCH CUTANEOUS at 08:41

## 2021-07-08 RX ADMIN — LORAZEPAM 0.5 MG: 0.5 TABLET ORAL at 21:21

## 2021-07-08 RX ADMIN — BENZTROPINE MESYLATE 1 MG: 1 TABLET ORAL at 17:06

## 2021-07-08 RX ADMIN — PSYLLIUM HUSK 1 PACKET: 3.4 POWDER ORAL at 08:41

## 2021-07-08 RX ADMIN — HALOPERIDOL 1 MG: 1 TABLET ORAL at 08:39

## 2021-07-08 RX ADMIN — BENZTROPINE MESYLATE 1 MG: 1 TABLET ORAL at 08:40

## 2021-07-08 RX ADMIN — LORAZEPAM 0.5 MG: 0.5 TABLET ORAL at 17:06

## 2021-07-08 RX ADMIN — LORAZEPAM 0.5 MG: 0.5 TABLET ORAL at 11:03

## 2021-07-08 RX ADMIN — HALOPERIDOL 1 MG: 1 TABLET ORAL at 21:21

## 2021-07-08 RX ADMIN — LORAZEPAM 0.5 MG: 0.5 TABLET ORAL at 08:39

## 2021-07-08 RX ADMIN — PHENYTOIN 1 MG: 125 SUSPENSION ORAL at 21:19

## 2021-07-08 RX ADMIN — GABAPENTIN 600 MG: 300 CAPSULE ORAL at 08:39

## 2021-07-08 RX ADMIN — ALUMINUM HYDROXIDE, MAGNESIUM HYDROXIDE, AND SIMETHICONE 15 ML: 200; 200; 20 SUSPENSION ORAL at 21:51

## 2021-07-08 RX ADMIN — HALOPERIDOL 1 MG: 1 TABLET ORAL at 11:03

## 2021-07-08 RX ADMIN — GABAPENTIN 600 MG: 300 CAPSULE ORAL at 17:06

## 2021-07-08 RX ADMIN — OLANZAPINE 10 MG: 10 TABLET, FILM COATED ORAL at 21:21

## 2021-07-08 RX ADMIN — FENOFIBRATE 145 MG: 145 TABLET ORAL at 08:39

## 2021-07-08 RX ADMIN — DIPHENHYDRAMINE HCL 25 MG: 25 TABLET ORAL at 21:19

## 2021-07-08 RX ADMIN — PANTOPRAZOLE SODIUM 40 MG: 40 TABLET, DELAYED RELEASE ORAL at 06:06

## 2021-07-08 NOTE — PROGRESS NOTES
07/07/21 3758   Team Meeting   Meeting Type Daily Rounds   Initial Conference Date 07/07/21   Patient/Family Present   Patient Present No   Patient's Family Present No       Daily Rounds Documentation  Team Members Participating  MD Lavonne Singer, NEYDA Schuler, LSW  Glenn Cintron, LSW  Dionicio Suggs, ANNALISE    Case reviewed  No outbursts, behaviors controlled  [facility] [address] [place] [address] [facility]  SW coordinating  Her interview with [facility] yesterday went very well

## 2021-07-08 NOTE — PROGRESS NOTES
07/08/21 0830   Team Meeting   Meeting Type Daily Rounds   Initial Conference Date 07/08/21   Patient/Family Present   Patient Present No   Patient's Family Present No     Daily Rounds Documentation     Team Members Present:   MD Asad Cortez, RN  Mary Wilkerson, ANNALISE Moreno, LSW  Liliane Shankar, LSW    Received Hep B vaccine  Controlled  Pleasant  Attended 5/6 groups  Compliant with medications and meals  Slept  Touring group home today

## 2021-07-08 NOTE — PLAN OF CARE
Problem: Alteration in Thoughts and Perception  Goal: Treatment Goal: Gain control of psychotic behaviors/thinking, reduce/eliminate presenting symptoms and demonstrate improved reality functioning upon discharge  Outcome: Not Progressing  Goal: Verbalize thoughts and feelings  Description: Interventions:  - Promote a nonjudgmental and trusting relationship with the patient through active listening and therapeutic communication  - Assess patient's level of functioning, behavior and potential for risk  - Engage patient in 1 on 1 interactions  - Encourage patient to express fears, feelings, frustrations, and discuss symptoms    - [place] patient to reality, help patient recognize reality-based thinking   - Administer medications as ordered and assess for potential side effects  - Provide the patient education related to the signs and symptoms of the illness and desired effects of prescribed medications  Outcome: Progressing  Goal: Refrain from acting on delusional thinking/internal stimuli  Description: Interventions:  - Monitor patient closely, per order   - Utilize least restrictive measures   - Set reasonable limits, give positive feedback for acceptable   - Administer medications as ordered and monitor of potential side effects  Outcome: Not Progressing  Goal: Agree to be compliant with medication regime, as prescribed and report medication side effects  Description: Interventions:  - Offer appropriate PRN medication and supervise ingestion; conduct AIMS, as needed   Outcome: Progressing

## 2021-07-08 NOTE — SOCIAL WORK
Patient reported her tour "went really well" and was bright while explaining this  She also started to look anxious as we discussed (CPS also present) and encouragement and perspective was provided  Pt was receptive and acknowledged that her discharge is right around the corner, giving her time to rest, prepare, and make aftercare arrangements  Pt reassured this timeline is good and will afford her opportunity to prepare for being back in the community  SW will follow up to arrange CSP meeting and also clarify the date [facility] can both do intake and officially open her for services  Pt in agreement and stated she though the group home looked nice and is excited to move there  -    Awaiting email / phone response from [facility] on the information requested above)

## 2021-07-08 NOTE — CASE MANAGEMENT
Confirmed with [facility]) that they are OK to have patient tour at [facility] tomorrow (leave 11AM via [facility]) to arrive around noon   Tour/visit should not take more than an hour, with pt anticipating to return by 2:30/3PM

## 2021-07-08 NOTE — NURSING NOTE
Linda Westbrookh has been visible, pleasant, and cooperative  Patient had 1 minor episode of yelling and was redirectable stated she was having hallucinations  Patient remains social with select peers  She is medication and meal compliant  C/O of heartburn and upset stomach Mylanta 15mL administered at 2151  Attended therapeutic activity and wrap up group  Consumed 50% of dinner  Continue to monitor

## 2021-07-08 NOTE — PROGRESS NOTES
Psychiatry Progress Note Paulie Hill [age] y o  female MRN: [id number]  [facility] Encounter: [id number]  Code Status: Level 1 - Full Code    PCP: Naty Calvin DO    Date of Admission:  4/7/2021 1435   Date of Service:  07/08/21    Patient Active Problem List   Diagnosis    Schizoaffective disorder, bipolar type ([facility] )    Uncomplicated alcohol dependence ([facility] )    Suicidal behavior    LYNN (generalized anxiety disorder)    Slow transit constipation    Deficiency of vitamin B    Degenerative disc disease, lumbar    Post-traumatic stress disorder, chronic    Lower extremity weakness    Generalized abdominal pain    Non-intractable vomiting    Medical clearance for psychiatric admission    Carbuncle    Alcohol dependence with unspecified alcohol-induced disorder ([facility] )    Mild intermittent asthma without complication    Tobacco abuse    Ear problem, bilateral    Gastroesophageal reflux disease    Right foot pain    Ear problem, right     Review of systems:       unremarkable  Diagnosis:            Schizoaffective bipolar, alcohol use disorder in early remission in controlled environment  Assessment   Overall Status:         No loud outbursts in the last 24 hours and is excited about touring the group home today in [place] [address] by staff   Certification Statement: The patient will continue to require additional inpatient hospital stay due to ongoing mood swings paranoia   Acceptance by patient:  accepting    Hopefulness in recovery:  Living in a group home   Understanding of medications: has good understanding   Involved in reintegration process:  Adjusting to the unit   Trusting in relationship with psychiatrist:  trusting   Justification for dual anti-psychotics: due to lack of response to single antipsychotics   Side effects from treatment: none    Medication changes    none today  Medication Education;  Risks and S/E and precautions of all meds given to patient and she did verbalize an understanding   Non-pharmacological treatments   Continue with individual, group, milieu and occupational therapy using recovery principles and psycho-education about accepting illness and the need for treatment  Safety   Safety and communication plan established to target dynamic risk factors discussed above  Discharge Plan     most likely to a group home with the act team again     Interval Progress      Patient is excited about a tour of the group home in the [place] today escorted by staff  She has not engaged in any loud outbursts in the last 24 hours and no yelling also reported  She has adjusted to her new roommate  However she is still delusional about people stalking her and believes someone living upstairs is talking to her through the vents on the ceiling and still worries if staff are still able to hypnotized her  She is friendly and places polite and tends to become suspicious in believes that she is still receiving messages from TV but questions it    Overall doing better and progressing attending groups and pleased with her progress    Sleep:                          good   appetite:                        good   compliance with medications :        Compliance  good   Side  Effects:                                    None reported   significant events:                             No loud outbursts and showing improvement and waiting to tour a group home today   group attendance:                            Attending almost all the groups 5/6    Mental Status Exam  Appearance: age appropriate, casually dressed, looks older than stated age, underweight   Found in the hallway pacing and was friendly and pleasant   Behavior: normal, pleasant, cooperative, appears anxious, mildly anxious,  more friendly today and not confrontational     Speech: fluent, clear, coherent, increased rate, pressured, hypertalkative,   Circumstantial pressured Mood: depressed, anxious, euphoric  Affect: labile, reactive, slightly brighter    Thought Process: normal abstract reasoning, less prominent, tends to be pressured and circumstantial and preoccupied perseverating on discharge    Thought Content: some paranoia, ideas of reference, but does appear as if paranoid  No current suicidal homicidal thoughts intent or plans verbalized  No phobias obsessions compulsions or distorted body perceptions elicited  Also believes TV is sending her messages from movie stars checking her if she is good or bad and threatening to get the feds to go after her    Excited about going on a tour of the group home today with staff escort   Perceptual Disturbances: no auditory hallucinations, no visual hallucinations, denies auditory hallucinations when asked, does not appear responding to internal stimuli, no voices reported today   Denies any voices today  Hx Risk Factors: chronic mood disorder, chronic psychotic symptoms, alcohol use, limted insight  Sensorium:        Oriented to 3 spheres and to situation   Cognition: recent and remote memory grossly intact  Consciousness: alert and awake  Attention: attention span and concentration are age appropriate  Intellect: appears to be of average intelligence  Insight: improving  Judgement: improving  Motor Activity: no abnormal movements     Vitals  Temp:  [97 2 °F (36 2 °C)-97 5 °F (36 4 °C)] 97 2 °F (36 2 °C)  HR:  [67-82] 77  Resp:  [19] 19  BP: (107-141)/(59-70) 109/59  No intake or output data in the 24 hours ending 07/08/21 0541    Lab Results: No New Labs Available For Today     Current Facility-Administered Medications   Medication Dose Route Frequency Provider Last Rate    acetaminophen  650 mg Oral Q6H PRSHONA Rowe Ma, MD      acetaminophen  650 mg Oral Q4H PRN Soledad Del Rio MD      acetaminophen  975 mg Oral Q6H PRN Soledad Del Rio MD      al mag oxide-diphenhydramine-lidocaine viscous  10 mL Swish & Swallow Q4H PRN Soledad Del Rio MD      albuterol  2 puff Inhalation Q6H PRN Ana Marte MD      aluminum-magnesium hydroxide-simethicone  15 mL Oral Q4H PRN Ana Marte MD      benztropine  1 mg Intramuscular Q4H PRN Max 6/day Ana Marte MD      benztropine  1 mg Oral Q4H PRN Max 6/day Ana Marte MD      benztropine  1 mg Oral BID Ana Marte MD      calcium carbonate  500 mg Oral TID PRN Ana Marte MD      Diclofenac Sodium  2 g Topical 4x Daily PRN Ana Marte MD      diphenhydrAMINE  25 mg Oral HS Ana Marte MD      fenofibrate  145 mg Oral Daily Ana Marte MD      gabapentin  600 mg Oral 4x Daily Ana Marte MD      haloperidol  1 mg Oral 4x Daily Ana Marte MD      haloperidol  5 mg Oral Q6H PRN Ana Marte MD      hydrOXYzine HCL  25 mg Oral Q6H PRN Max 4/day Ana Marte MD      hydrOXYzine HCL  50 mg Oral Q6H PRN Max 4/day Ana Marte MD      lidocaine  1 patch Topical Daily Ana Marte MD      LORazepam  1 mg Intramuscular Q6H PRN Ana Marte MD      LORazepam  0 5 mg Oral 4x Daily Ana Marte MD      magnesium hydroxide  30 mL Oral Daily PRN Ana Marte MD      methocarbamol  500 mg Oral Q8H PRN Ana Marte MD      nicotine  1 patch Transdermal Daily Ana Marte MD      nicotine polacrilex  4 mg Oral Q2H PRN Ana Marte MD      OLANZapine  2 5 mg Oral Q8H PRN Ana Marte MD      OLANZapine  5 mg Oral Q3H PRN Ana Marte MD      OLANZapine  7 5 mg Oral Q3H PRN Ana Marte MD      OLANZapine  10 mg Intramuscular Q3H PRN Ana Marte MD      OLANZapine  5 mg Intramuscular Q3H PRN Ana Marte MD      OLANZapine  10 mg Oral HS Ana Marte MD      OLANZapine  15 mg Oral Daily Ana Marte MD      paliperidone palmitate ER  234 mg Intramuscular Q30 Days Ana Marte MD      pantoprazole  40 mg Oral Early Morning Ana Marte MD      Pramox-PE-Glycerin-Petrolatum  1 application Rectal 4x Daily PRN Ana Marte MD      prazosin  1 mg Oral HS Ana Marte MD      psyllium  1 packet Oral Daily Jayne Chapman MD      sertraline  100 mg Oral Daily Jayne Chapman MD         Counseling / Coordination of Care: Total floor / unit time spent today 15 minutes  Greater than 50% of total time was spent with the patient and / or family counseling and / or somewhat receptive to supportive listening and teaching positive coping skills to deal with symptom mangement  Patient's Rights, confidentiality and exceptions to confidentiality, use of automated medical record, [address] [place] [address] staff access to medical record, and consent to treatment reviewed  This note was  not shared with the patient because it might further aggravate her psychiatric condition  This note has been dictated

## 2021-07-08 NOTE — DISCHARGE INSTR - OTHER ORDERS
You are being discharged to:  [address]  [address] [facility] at your group home is [address] [phone]  Your ACT team, Clark Olson will follow up with you: You will meet with Jeanie Kellogg, nurse, on Friday 7/30/2021, Gregory Nath on Monday 8/2/2021 and Kera Lenz, drug and alcohol specialist on Wednesday 8/4/2021  You will see your psychiatrist Dr Patt Hodgkin on Friday 8/6/2021    ***Because [facility] could not supply your next Omero Kayy IM, your script was sent to [facility] (per your ACT team request), so it can be filled ahead of time  Your next Omero Galax IM is due August 16th   ------------    Don't hesitate to refer to your WRAP plan, crisis plan, and the other great work you did while on [facility]! You have developed many positive and healthy coping skills that include your journal time and self expression, going for walks and being physically active, taking care of yourself with good food choices and hygiene, participating in supportive and educational groups, socializing with peers, and reaching out to your team to talk and get the support you deserve!    ------    The following resources are available to you:    [address] Suicide Hotline: 6205.478.1971    24/7 [address] [facility]: [facility] Toll Free: [phone] / Hector Arvizu 46: [address]: [phone]    [facility] 2-1-1: This is a toll free, confidential, 24-hour-a-day service which connects you to a community  in your area who can help you find services and resources that are available to you locally and provide critical services that can improve and save lives    Call: 211  /Website: https://Th[employer] net/    [place] [address]:  [employer]  863-739-HELP (5912)  TTY: [phone]    Alcohol Anonymous  CALL [phone] for information on meetings near you  The [facility] Family-to-Family Education Program is a free 12-week (2 1/2 hours/week) course for families of individuals with severe brain disorders (mental illnesses)  The classes are taught by trained family members  All course materials are furnished at no cost to you  Below are some details  To register, e-mail [email or web address] or call [phone]  The curriculum focuses on schizophrenia, bipolar disorder (manic depression), clinical depression, panic disorders and obsessive-compulsive disorder (OCD)  The course discusses the clinical treatment of these illnesses and teaches the knowledge and skills that family members need to cope more effectively  Topics Include:   Learning about feelings, learning about facts    Schizophrenia, major depression and sudheer: diagnosis and dealing with critical periods    Subtypes of depression and bipolar disorder, panic disorder and OCD; diagnosis and causes; sharing our stories    The biology of the brain/new research    Problem solving workshops    Medication review    Empathy workshop - what its like to have a brain disorder    Communication skills workshop    Self-care and relative groups   [facility], services available    Advocacy: fighting stigma    Review and certification ceremony    Imzs-uq-Nwqj Education Course  The [employer] Education class is a ten week - two hours per week - experiential education course on the topic of recovery for any person with serious mental illness who is interested in establishing and maintaining wellness  The course uses a combination of lecture, interactive exercises, and structural group processes  The diversity of experience among participants affords for a lively dynamic that moves the course along  [employer]s Wqar-wq-Woci Education class is offered free of charge to people who experience mental illness   You do not need to be a member of [employer] to take the course  Courses are taught by teams of trained mentors/peer-teachers who are themselves experienced at living well with mental illness  Below are some details  To register, call [phone] or e-mail [email or web address]  Sign up today! [facility] group is for family members, caregivers, and loved ones of individuals living with the everyday challenges of mental illness  The leaders are family members in the same situation  Sessions take place in an intimate, confidential setting to allow families to share openly with each other  These support groups allow participants to learn from the experiences of other group members, share coping strategies, and offer each other encouragement and understanding  Elia Sequeira know that you are not alone  CALL FOR UPDATED SCHEDULE (DUE TO COVID)   RADHA  Monthly: 3rd Monday, 7:00-8:30 pm  [facility], [place]  Monthly: 4th Tuesday, 7:00-8:30 pm  [address] [facility]  Monthly: 1st Monday, 7:00-8:30 pm  [address]         Monthly Support for Persons with Mental Illness  The Peer Support Group is a monthly meeting for individuals facing the challenges of recovering from severe and persistent mental illness  Depression, manic depression, schizophrenia, and general anxiety disorder are only a few of the diagnoses of individuals who have found a supportive place at our meetings  Our [place]  We are a fellowship of individuals who share a common goal of recovery and the ability to maintain mental and emotional stability  We help others and ourselves through sharing our experiences, strength and hope with each other  No matter how traumatic our past or how despairing our present may be, there is hope for a new day      Sessions take place in an intimate, confidential setting to allow individuals to share openly with each other  Johanna Dickson know that you are not alone  Drop in--no registration is necessary  Here are the times and locations  DAMI  Monthly: 1st Monday, 7:00-8:30 pm  [facility]  [address] [place], [place]   [id number]  Monthly: 3rd Monday, 7:00-8:30 pm  [address], TOM C[address] --- Tues :00 - 2:30 pm   For Family Members --- Wed 2:00 - 3:30 pm   For Peers --- Thurs 6:00 - 7:30 pm   Come share support with those who understand  [place] for our [facility], held on [facility] led by 2 trained facilitators with lived experience, by emailing [email or web address] or calling [address]  A hot breakfast is served from 7:00am - 9:00am   A hot lunch is provided from 11am to 12pm (Monday through Friday for anyone needing a meal)  For more information, please call Dru Wray 99 Coordinator at [phone] Ext  [phone]  HELPFUL NUMBERS:  Statewide Support & Referrel Helpline: 5-444.634.3226  For MelissageorgesJulian 15   · National Suicide Prevention Lifeline: 4-893-134-IZSS (4738)     · Crisis Text Line: Text PA to 029-951 · [employer]: 5-549.641.8616 or www  zzhb0mziqn  org    · Florian Lew: 5-740-920-RCTZ (8683)   · Disaster Distress Helpline: 1-310.389.2928   · Get Help Now Hotline (for substance use disorders): 3-241.625.1651          [facility] Partial Hospitalization Program    Bret said, "You may not control all the events that happen to you, but you can decide not to be reduced by them "   We are currently living in quite unpredictable times, where we may be feeling completely out of control  At [facility], we understand the distress which results in feeling out of control, which is why we remained open during the pandemic       [facility] continues to provide partial hospitalization services in a telehealth setting amidst COVID-19 to ensure the health and emotional safety of our clients  We have adapted our ability to provide care to a variety of clients by offering our partial program services via the [employer]  Virtual care is being provided at  "[facility]"  Time  Group    9:00 to 9:30  Morning Assessment    9:30 to 10:30 Psychotherapy Group     Break    10:30-11:30 Education Group    11:45-12:30 Lunch    12:30-1:30 Allied Therapy    1:30-2:00 Wrap Up and Goal Setting    As scheduled Case Management Sessions with CM        Eligible clients need access to internet and hardware to participate in the program  They need the independent and technological ability to access the groups and participate in a virtual setting  They need to be able to set aside the interrupted time to participate in the program from 9:00am - 2pm      Clients are offered three groups each day and they are offered up to three individual Case Management sessions via phone or Teams to work on skill building, aftercare planning, and connection to services  Clients are encouraged to inform their outpatient team that they will be attending [facility] and their  can coordinate a return to treatment with them upon discharge  NATIONAL SUBSTANCE ABUSE HELP:  [facility]'s National Helpline  800-662-HELP (9505)  TTY: 488.109.1869    [facility] 2-1-1: This is a toll free, confidential, 24-hour-a-day service which connects you to a community  in your area who can help you find services and resources that are available to you locally and provide critical services that can improve and save lives    Call: 211  /Website: https://[employer]al[employer] net/      Shira 7851 Drop in HILARY - call for updated schedule /  [address]  Phone: [address] At [facility]) - call for updated schedule / [address]  Karyn Keys, [address]  Phone: [facility] [place] of the [address] [place] [address] Dr NARANJO [address] [facility], [facility] [place]  Phone: [phone]      TARGETED CASE MANAGEMENT AND RESOURCE COORDINATION    Resources for [facility] - Adult  [address], [address] [facility]  Phone: [address] [facility] ([address] [facility])  - Youth and Adult  [address], [address]  [address]  Phone: [phone]     [facility]- Targeted Case Management  Phone: [phone]

## 2021-07-08 NOTE — NURSING NOTE
Pt returned from pass at [phone] with BHT from group home tour  Reported pt did well and no issues noted  Pt pleasant upon return and rested in room  Pt had one episode of yelling in room, but able to calm self without issue  Will continue to monitor

## 2021-07-08 NOTE — NURSING NOTE
Pt is medication and meal compliant  Pt is visible in the milieu and social with others at times, but mostly keeping to self  Pt uses pt phone often needing some redirection to share time with peers  Pt accepting to this without issue  Pt has had not outbursts other than becoming loud with CC when redirected that she was unable to smoke while on pass  Pt is currently leaving unit with BHT on pass to tour [facility]

## 2021-07-08 NOTE — DISCHARGE INSTR - APPOINTMENTS
What you need to know about coronavirus disease 2019 (COVID-19)     What is coronavirus disease 2019 (COVID-19)? Coronavirus disease 2019 (COVID-19) is a respiratory illness that can spread from person to person  The virus that causes COVID-19 is a novel coronavirus that was first identified during an investigation into an outbreak in [place], [place]  Can people in [place]  get COVID[place]19? Yes  COVID-19 is spreading from person to person in parts of the [facility]  Risk of infection with COVID-19 is higher for people who are close contacts of someone known to have COVID-19, for example healthcare workers, or household members  Other people at higher risk for infection are those who live in or have recently been in an area with ongoing spread of COVID-19  Learn more about places with ongoing spread at   [employer] [facility]  html#geographic  Have there been cases of COVID[place]19 in [place] ?   Yes  The first case of COVID-19 in the [place] was reported on January 21, 2020  The current count of cases of COVID-19 in the [place] is available on [employer] at [facility]  How does COVID-19 spread? The virus that causes COVID-19 probably emerged from an animal source, but is now spreading from person to person  The virus is thought to spread mainly between people who are in close contact with one another (within about 6 feet) through respiratory droplets produced when an infected person coughs or sneezes  It also may be possible that a person can get COVID-19 by touching a surface or object that has the virus on it and then touching their own mouth, nose, or possibly their eyes, but this is not thought to be the main way the virus spreads  Learn what is known about the spread of newly emerged coronaviruses at [employer]       What are the symptoms of COVID-19? Patients with COVID-19 have had mild to severe respiratory illness with symptoms of   fever   cough   shortness of breath    What are severe complications from this virus? Some patients have pneumonia in both lungs, multi-organ failure and in some cases death  How can I help protect myself? People can help protect themselves from respiratory illness with everyday preventive actions  Avoid close contact with people who are sick  Avoid touching your eyes, nose, and mouth withunwashed hands  Wash your hands often with soap and water for at least 20 seconds  Use an alcohol-based hand  that    contains at least 60% alcohol if soap and water are not available  If you are sick, to keep from spreading respiratory illness to others, you should   Stay home when you are sick  Cover your cough or sneeze with a tissue, then throw the tissue in the trash  Clean and disinfect frequently touched objectsand surfaces  What should I do if I recently traveled from an area with ongoing spread of COVID-19? If you have traveled from an affected area, there may be restrictions on your movements for up to 2 weeks  If you develop symptoms during that period (fever, cough, trouble breathing), seek medical advice  Call the office of your health care provider before you go, and tell them about your travel and your symptoms  They will give you instructions on how to get care without exposing other people to your illness  While sick, avoid contact with people, don't go out and delay any travel to reduce the possibility of spreading illness to others  Is there a vaccine? There is currently no vaccine to protect against COVID-19  The best way to prevent infection is to take everyday preventive actions, like avoiding close contact with people who are sick and washing your hands often  Is there a treatment? There is no specific antiviral treatment for COVID-19  People with COVID-19 can seek medical care to helprelieve symptoms  For more information: www cdc gov/AUYXF49IW 924696-L 03/03/2020     What to do if you are sick with coronavirus disease 2019 (COVID-19): If you are sick with COVID-19 or suspect you are infected with the virus that causes COVID-19, follow the steps below to help prevent the disease from spreading to people in your home and community  Stay home except to get medical care   You should restrict activities outside your home, except for getting medical care  Do not go to work, school, or public areas  Avoid using public transportation, ride-sharing, or taxis  Separate yourself from other people and animals in your home:    People: As much as possible, you should stay in a specific room and away from other people in your home  Also, you should use a separate bathroom, if available  Animals: Do not handle pets or other animals while sick  See COVID-19 and Animals for more information  Call ahead before visiting your doctor: If you have a medical appointment, call the healthcare provider and tell them that you have or may have COVID-19  This will help the healthcare provider's office take steps to keep other people from getting infected or exposed  Wear a facemask: You should wear a facemask when you are around other people (e g , sharing a room or vehicle) or pets and before you enter a healthcare provider's office  If you are not able to wear a facemask (for example, because it causes trouble breathing), then people who live with you should not stay in the same room with you, or they should wear a facemask if they enteryour room  Cover your coughs and sneezes:   Cover your mouth and nose with a tissue when you cough or sneeze   Throw used tissues in a lined trash can; immediately wash your hands with soap and water for at least 20 seconds or clean your hands with an alcohol-based hand  that contains at least 60 to 95% alcohol, covering all surfaces of your hands and rubbing them together until they feel dry  Soap and water should be used preferentially if hands are visibly dirty  Avoid sharing personal household items: You should not share dishes, drinking glasses, cups, eating utensils, towels, or bedding with other people or pets in your home  After using these items, they should be washed thoroughly with soap and water  Clean your hands often:  Wash your hands often with soap and water for at least 20 seconds  If soap and water are not available, clean your hands with an alcohol-based hand  that contains at least 60% alcohol, covering all surfaces of your hands and rubbing them together until they feel dry  Soap and water should be used preferentially if hands are visibly dirty  Avoid touching your eyes, nose, and mouth with unwashed hands  Clean all "high-touch" surfaces every day:  High touch surfaces include counters, tabletops, doorknobs, bathroom fixtures, toilets, phones, keyboards, tablets, and bedside tables  Also, clean any surfaces that may have blood, stool, or body fluids on them  Use a household cleaning spray or wipe, according to the label instructions  Labels contain instructions for safe and effective use of the cleaning product including precautions you should take when applying the product, such as wearing gloves and making sure you have good ventilation during use of the product  Monitor your symptoms:  Seek prompt medical attention if your illness is worsening (e g , difficulty breathing)  Before seeking care, call your healthcare provider and tell them that you have, or are being evaluated for, COVID-19  Put on a facemask before you enter the facility  These steps will help the healthcare provider's office to keep other people in the office or waiting room from getting infectedor exposed  Ask your healthcare provider to call the local or state health department   Persons who are placed under active monitoring or facilitated self-monitoring should follow instructions provided by their local health department or occupational health professionals, as appropriate  If you have a medical emergency and need to call 911, notify the dispatch personnel that you have, or are being evaluated for COVID-19  If possible, put on a facemask before emergency medical services arrive  Discontinuing home isolation:  Patients with confirmed COVID-19 should remain under home isolation precautions until the risk of secondary transmission to others is thought to be low  The decision to discontinue home isolation precautions should be made on a case-by-case basis, in consultation with healthcare providers and state and local health departments  For more information: www cdc gov/COVID19   CS [id number] 02/24/2020     Stay home when you are sick,except to get medical care  Wash your hands often with soap and water for at least 20 seconds  Cover your cough or sneeze with a tissue, then throw the tissue in the trash  Clean and disinfect frequently touched objects and surfaces  Avoid touching your eyes, nose, and mouth  STOP THE SPREAD OF GERMS  For more information: www cdc gov/COVID19 Avoid close contact with people who are sick  Help prevent the spread of respiratory diseases like COVID-19  [facility] [place]   COVID-19 CRISIS COUNSELING PROGRAM   GET CONNECTED WITH A FREE CRISIS COUNSELOR   CALL [phone]   Do you feel    Stressed? Overwhelmed? Alone? Afraid? Anxiety? During these uncertain times, you are not alone  We are here to listen  Please call our [facility] and Referral Line   [phone] TTY: [phone]   There are trained professionals available 24/7 ready to help you navigate these unprecedented challenges  These services are FREE & CONFIDENTIAL     This publication was made possible by [employer] Number [id number], in collaboration with the [place] [facility]

## 2021-07-09 PROBLEM — S09.90XA INJURY OF HEAD: Status: ACTIVE | Noted: 2021-07-09

## 2021-07-09 PROCEDURE — 99232 SBSQ HOSP IP/OBS MODERATE 35: CPT | Performed by: PSYCHIATRY & NEUROLOGY

## 2021-07-09 RX ADMIN — PHENYTOIN 1 MG: 125 SUSPENSION ORAL at 21:23

## 2021-07-09 RX ADMIN — HALOPERIDOL 1 MG: 1 TABLET ORAL at 12:50

## 2021-07-09 RX ADMIN — GABAPENTIN 600 MG: 300 CAPSULE ORAL at 17:05

## 2021-07-09 RX ADMIN — LIDOCAINE 1 PATCH: 50 PATCH CUTANEOUS at 08:33

## 2021-07-09 RX ADMIN — NICOTINE 1 PATCH: 21 PATCH, EXTENDED RELEASE TRANSDERMAL at 08:34

## 2021-07-09 RX ADMIN — ACETAMINOPHEN 975 MG: 325 TABLET ORAL at 17:23

## 2021-07-09 RX ADMIN — OLANZAPINE 10 MG: 10 TABLET, FILM COATED ORAL at 21:22

## 2021-07-09 RX ADMIN — FENOFIBRATE 145 MG: 145 TABLET ORAL at 08:37

## 2021-07-09 RX ADMIN — SERTRALINE HYDROCHLORIDE 100 MG: 100 TABLET ORAL at 08:37

## 2021-07-09 RX ADMIN — PANTOPRAZOLE SODIUM 40 MG: 40 TABLET, DELAYED RELEASE ORAL at 05:57

## 2021-07-09 RX ADMIN — LORAZEPAM 0.5 MG: 0.5 TABLET ORAL at 08:37

## 2021-07-09 RX ADMIN — HALOPERIDOL 1 MG: 1 TABLET ORAL at 17:05

## 2021-07-09 RX ADMIN — LORAZEPAM 0.5 MG: 0.5 TABLET ORAL at 12:50

## 2021-07-09 RX ADMIN — GABAPENTIN 600 MG: 300 CAPSULE ORAL at 21:22

## 2021-07-09 RX ADMIN — OLANZAPINE 15 MG: 5 TABLET, FILM COATED ORAL at 08:37

## 2021-07-09 RX ADMIN — GABAPENTIN 600 MG: 300 CAPSULE ORAL at 08:37

## 2021-07-09 RX ADMIN — LORAZEPAM 0.5 MG: 0.5 TABLET ORAL at 21:22

## 2021-07-09 RX ADMIN — PSYLLIUM HUSK 1 PACKET: 3.4 POWDER ORAL at 08:33

## 2021-07-09 RX ADMIN — BENZTROPINE MESYLATE 1 MG: 1 TABLET ORAL at 08:38

## 2021-07-09 RX ADMIN — HALOPERIDOL 1 MG: 1 TABLET ORAL at 21:23

## 2021-07-09 RX ADMIN — HALOPERIDOL 1 MG: 1 TABLET ORAL at 08:37

## 2021-07-09 RX ADMIN — GABAPENTIN 600 MG: 300 CAPSULE ORAL at 12:50

## 2021-07-09 RX ADMIN — BENZTROPINE MESYLATE 1 MG: 1 TABLET ORAL at 17:05

## 2021-07-09 RX ADMIN — LORAZEPAM 0.5 MG: 0.5 TABLET ORAL at 17:05

## 2021-07-09 RX ADMIN — DIPHENHYDRAMINE HCL 25 MG: 25 TABLET ORAL at 21:23

## 2021-07-09 NOTE — PROGRESS NOTES
Psychiatry Progress Note Paulie Hill [age] y o  female MRN: [id number]  Unit/Bed#: [facility] 817-20 Encounter: [id number]  Code Status: Level 1 - Full Code    PCP: Naty Calvin DO    Date of Admission:  4/7/2021 1435   Date of Service:  07/09/21    Patient Active Problem List   Diagnosis    Schizoaffective disorder, bipolar type ([facility] )    Uncomplicated alcohol dependence ([facility] )    Suicidal behavior    LYNN (generalized anxiety disorder)    Slow transit constipation    Deficiency of vitamin B    Degenerative disc disease, lumbar    Post-traumatic stress disorder, chronic    Lower extremity weakness    Generalized abdominal pain    Non-intractable vomiting    Medical clearance for psychiatric admission    Carbuncle    Alcohol dependence with unspecified alcohol-induced disorder ([facility] )    Mild intermittent asthma without complication    Tobacco abuse    Ear problem, bilateral    Gastroesophageal reflux disease    Right foot pain    Ear problem, right     Review of systems:     Under my  Diagnosis:            Schizoaffective bipolar, alcohol use disorder in early remission in controlled environment  Assessment   Overall Status:          Happy about touring the group home yesterday with staff which reportedly went well and still occasionally yells but manageable   Certification Statement: The patient will continue to require additional inpatient hospital stay due to ongoing mood swings paranoia   Acceptance by patient:  accepting    Hopefulness in recovery:  Living in a group home   Understanding of medications: has good understanding   Involved in reintegration process:  Adjusting to the unit   Trusting in relationship with psychiatrist:  trusting   Justification for dual anti-psychotics: due to lack of response to single antipsychotics   Side effects from treatment: none    Medication changes    none today  Medication Education;  Risks and S/E and precautions of all meds given to patient and she did verbalize an understanding   Non-pharmacological treatments   Continue with individual, group, milieu and occupational therapy using recovery principles and psycho-education about accepting illness and the need for treatment  Safety   Safety and communication plan established to target dynamic risk factors discussed above  Discharge Plan     most likely to a group home with the act team again     Interval Progress       Patient did tour the group home in the [place] escorted by staff which reportedly went well and she is go hoping to get discharged in the near future  It was 1 episode of the yelling since she came back but short lived and she was able to be redirected  Continues to claim to feel that people are stalking her and talking to her through the vents on the ceiling and that she is receiving messages from TV and people may be doing hypnosis on her but she is not appearing to preoccupied with those unless asked    No p r n  snack greeted in several days and attending groups and making progress    Sleep:                           good   appetite:                         good   compliance with medications :         good   Side  Effects:                                     None reported   significant events:                              Occasional outbursts only and showing improvement and happy about the tour at the group home trip which reportedly went well   group attendance:                              Attending most of the  groups    Mental Status Exam  Appearance: age appropriate, casually dressed, looks older than stated age, overweight   Friendly pleasant when approached happy about the interview at the group home which went well yesterday Behavior: normal, pleasant, cooperative, appears anxious, mildly anxious,  more friendly today and not confrontational     Speech: fluent, clear, coherent, increased rate, pressured, hypertalkative,   Circumstantial pressured    Mood: depressed, anxious, euphoric  Affect: labile, reactive, slightly brighter    Thought Process: normal abstract reasoning, less prominent, tends to be pressured and circumstantial and preoccupied perseverating on discharge    Thought Content: some paranoia, ideas of reference, but does appear as if paranoid  No current suicidal homicidal thoughts intent or plans verbalized  No phobias obsessions compulsions or distorted body perceptions elicited  Also believes TV is sending her messages from movie stars checking her if she is good or bad and threatening to get the feds to go after her    Excited about possibility of moving into the group home soon   Perceptual Disturbances: no auditory hallucinations, no visual hallucinations, denies auditory hallucinations when asked, does not appear responding to internal stimuli, no voices reported today    No voices reported today  Hx Risk Factors: chronic mood disorder, chronic psychotic symptoms, alcohol use, limted insight  Sensorium:     Oriented to 3 spheres and to situation  Cognition: recent and remote memory grossly intact  Consciousness: alert and awake  Attention: attention span and concentration are age appropriate  Intellect: appears to be of average intelligence  Insight: improving  Judgement: improving  Motor Activity: no abnormal movements     Vitals  Temp:  [97 2 °F (36 2 °C)-97 5 °F (36 4 °C)] 97 2 °F (36 2 °C)  HR:  [72-84] 76  Resp:  [18] 18  BP: (106-120)/(68-78) 120/78  No intake or output data in the 24 hours ending 07/09/21 0542    Lab Results: No New Labs Available For Today     Current Facility-Administered Medications   Medication Dose Route Frequency Provider Last Rate    acetaminophen  650 mg Oral Q6H PRSHONA Martin MD      acetaminophen  650 mg Oral Q4H PRSHONA Martin MD      acetaminophen  975 mg Oral Q6H PRN Cristhian Martin MD      al mag oxide-diphenhydramine-lidocaine viscous  10 mL Swish & Swallow Q4H PRSHONA Martin MD  albuterol  2 puff Inhalation Q6H PRN Dmitriy Ornelas MD      aluminum-magnesium hydroxide-simethicone  15 mL Oral Q4H PRN Dmitriy Ornelas MD      benztropine  1 mg Intramuscular Q4H PRN Max 6/day Dmitriy Ornelas MD      benztropine  1 mg Oral Q4H PRN Max 6/day Dmitriy Ornelas MD      benztropine  1 mg Oral BID Dmitriy Ornelas MD      calcium carbonate  500 mg Oral TID PRN Dmitriy Ornelas MD      Diclofenac Sodium  2 g Topical 4x Daily PRN Dmitriy Ornelas MD      diphenhydrAMINE  25 mg Oral HS Dmitriy Ornelas MD      fenofibrate  145 mg Oral Daily Dmitriy Ornelas MD      gabapentin  600 mg Oral 4x Daily Dmitriy Ornelas MD      haloperidol  1 mg Oral 4x Daily Dmitriy Ornelas MD      haloperidol  5 mg Oral Q6H PRN Dmitriy Ornelas MD      hydrOXYzine HCL  25 mg Oral Q6H PRN Max 4/day Dmitriy Ornelas MD      hydrOXYzine HCL  50 mg Oral Q6H PRN Max 4/day Dmitriy Ornelas MD      lidocaine  1 patch Topical Daily Dmitriy Ornelas MD      LORazepam  1 mg Intramuscular Q6H PRN Dmitriy Ornelas MD      LORazepam  0 5 mg Oral 4x Daily Dmitriy Ornelas MD      magnesium hydroxide  30 mL Oral Daily PRN Dmitriy Ornelas MD      methocarbamol  500 mg Oral Q8H PRN Dmitriy Ornelas MD      nicotine  1 patch Transdermal Daily Dmitriy Ornelas MD      nicotine polacrilex  4 mg Oral Q2H PRN Dmitriy Ornelas MD      OLANZapine  2 5 mg Oral Q8H PRN Dmitriy Ornelas MD      OLANZapine  5 mg Oral Q3H PRN Dmitriy Ornelas MD      OLANZapine  7 5 mg Oral Q3H PRN Dmitriy Ornelas MD      OLANZapine  10 mg Intramuscular Q3H PRN Dmitriy Ornelas MD      OLANZapine  5 mg Intramuscular Q3H PRN Dmitriy Ornelas MD      OLANZapine  10 mg Oral HS Dmitriy Ornelas MD      OLANZapine  15 mg Oral Daily Dmitriy Ornelas MD      paliperidone palmitate ER  234 mg Intramuscular Q30 Days Dmitriy Ornelas MD      pantoprazole  40 mg Oral Early Morning Dmitriy Ornelas MD      Pramox-PE-Glycerin-Petrolatum  1 application Rectal 4x Daily PRN Dmitriy Ornelas MD      prazosin  1 mg Oral HS Dmitriy Ornelas MD      psyllium  1 packet Oral Daily Tootie Ames MD      sertraline  100 mg Oral Daily Tootie Ames MD         Counseling / Coordination of Care: Total floor / unit time spent today 15 minutes  Greater than 50% of total time was spent with the patient and / or family counseling and / or somewhat receptive to supportive listening and teaching positive coping skills to deal with symptom mangement  Patient's Rights, confidentiality and exceptions to confidentiality, use of automated medical record, [address] [place] [address] staff access to medical record, and consent to treatment reviewed  This note was  not shared with the patient because it might further aggravate her psychiatric condition  This note has been dictated

## 2021-07-09 NOTE — PROGRESS NOTES
07/09/21 1100   Activity/Group Checklist   Group Community meeting   Attendance Attended   Attendance Duration (min) 46-60   Interactions Interacted appropriately   Affect/Mood Calm;Constricted   Goals Achieved Able to listen to others     Mostly listened to others share about their barriers in recovery  Did not contribute her own thoughts but was engaged listening

## 2021-07-09 NOTE — PLAN OF CARE
Problem: Alteration in Thoughts and Perception  Goal: Treatment Goal: Gain control of psychotic behaviors/thinking, reduce/eliminate presenting symptoms and demonstrate improved reality functioning upon discharge  Outcome: Progressing  Goal: Verbalize thoughts and feelings  Description: Interventions:  - Promote a nonjudgmental and trusting relationship with the patient through active listening and therapeutic communication  - Assess patient's level of functioning, behavior and potential for risk  - Engage patient in 1 on 1 interactions  - Encourage patient to express fears, feelings, frustrations, and discuss symptoms    - [place] patient to reality, help patient recognize reality-based thinking   - Administer medications as ordered and assess for potential side effects  - Provide the patient education related to the signs and symptoms of the illness and desired effects of prescribed medications  Outcome: Progressing  Goal: Refrain from acting on delusional thinking/internal stimuli  Description: Interventions:  - Monitor patient closely, per order   - Utilize least restrictive measures   - Set reasonable limits, give positive feedback for acceptable   - Administer medications as ordered and monitor of potential side effects  Outcome: Progressing  Goal: Agree to be compliant with medication regime, as prescribed and report medication side effects  Description: Interventions:  - Offer appropriate PRN medication and supervise ingestion; conduct AIMS, as needed   Outcome: Progressing  Goal: Attend and participate in unit activities, including therapeutic, recreational, and educational groups  Description: Interventions:  -Encourage Visitation and family involvement in care  Outcome: Progressing  Goal: Recognize dysfunctional thoughts, communicate reality-based thoughts at the time of discharge  Description: Interventions:  - Provide medication and psycho-education to assist patient in compliance and developing insight into his/her illness   Outcome: Progressing  Goal: Complete daily ADLs, including personal hygiene independently, as able  Description: Interventions:  - Observe, teach, and assist patient with ADLS  - Monitor and promote a balance of rest/activity, with adequate nutrition and elimination   Outcome: Progressing     Problem: Ineffective Coping  Goal: Cooperates with admission process  Description: Interventions:   - Complete admission process  Outcome: Progressing  Goal: Identifies ineffective coping skills  Outcome: Progressing  Goal: Identifies healthy coping skills  Outcome: Progressing  Goal: Demonstrates healthy coping skills  Outcome: Progressing  Goal: Participates in unit activities  Description: Interventions:  - Provide therapeutic environment   - Provide required programming   - Redirect inappropriate behaviors   Outcome: Progressing  Goal: Patient/Family participate in treatment and DC plans  Description: Interventions:  - Provide therapeutic environment  Outcome: Progressing  Goal: Patient/Family verbalizes awareness of resources  Outcome: Progressing  Goal: Understands least restrictive measures  Description: Interventions:  - Utilize least restrictive behavior  Outcome: Progressing  Goal: Free from restraint events  Description: - Utilize least restrictive measures   - Provide behavioral interventions   - Redirect inappropriate behaviors   Outcome: Progressing     Problem: Risk for Self Injury/Neglect  Goal: Treatment Goal: Remain safe during length of stay, learn and adopt new coping skills, and be free of self-injurious ideation, impulses and acts at the time of discharge  Outcome: Progressing  Goal: Verbalize thoughts and feelings  Description: Interventions:  - Assess and re-assess patient's lethality and potential for self-injury  - Engage patient in 1:1 interactions, daily, for a minimum of 15 minutes  - Encourage patient to express feelings, fears, frustrations, hopes  - Establish rapport/trust with patient   Outcome: Progressing  Goal: Refrain from harming self  Description: Interventions:  - Monitor patient closely, per order  - Develop a trusting relationship  - Supervise medication ingestion, monitor effects and side effects   Outcome: Progressing  Goal: Attend and participate in unit activities, including therapeutic, recreational, and educational groups  Description: Interventions:  - Provide therapeutic and educational activities daily, encourage attendance and participation, and document same in the medical record  - Obtain collateral information, encourage visitation and family involvement in care   Outcome: Progressing  Goal: Recognize maladaptive responses and adopt new coping mechanisms  Outcome: Progressing  Goal: Complete daily ADLs, including personal hygiene independently, as able  Description: Interventions:  - Observe, teach, and assist patient with ADLS  - Monitor and promote a balance of rest/activity, with adequate nutrition and elimination  Outcome: Progressing     Problem: Depression  Goal: Treatment Goal: Demonstrate behavioral control of depressive symptoms, verbalize feelings of improved mood/affect, and adopt new coping skills prior to discharge  Outcome: Progressing  Goal: Verbalize thoughts and feelings  Description: Interventions:  - Assess and re-assess patient's level of risk   - Engage patient in 1:1 interactions, daily, for a minimum of 15 minutes   - Encourage patient to express feelings, fears, frustrations, hopes   Outcome: Progressing  Goal: Refrain from harming self  Description: Interventions:  - Monitor patient closely, per order   - Supervise medication ingestion, monitor effects and side effects   Outcome: Progressing  Goal: Refrain from isolation  Description: Interventions:  - Develop a trusting relationship   - Encourage socialization   Outcome: Progressing  Goal: Refrain from self-neglect  Outcome: Progressing  Goal: Attend and participate in unit activities, including therapeutic, recreational, and educational groups  Description: Interventions:  - Provide therapeutic and educational activities daily, encourage attendance and participation, and document same in the medical record   Outcome: Progressing  Goal: Complete daily ADLs, including personal hygiene independently, as able  Description: Interventions:  - Observe, teach, and assist patient with ADLS  -  Monitor and promote a balance of rest/activity, with adequate nutrition and elimination   Outcome: Progressing     Problem: Anxiety  Goal: Anxiety is at manageable level  Description: Interventions:  - Assess and monitor patient's anxiety level  - Monitor for signs and symptoms (heart palpitations, chest pain, shortness of breath, headaches, nausea, feeling jumpy, restlessness, irritable, apprehensive)  - Collaborate with interdisciplinary team and initiate plan and interventions as ordered    - [place] patient to unit/surroundings  - Explain treatment plan  - Encourage participation in care  - Encourage verbalization of concerns/fears  - Identify coping mechanisms  - Assist in developing anxiety-reducing skills  - Administer/offer alternative therapies  - Limit or eliminate stimulants  Outcome: Progressing     Problem: Risk for Violence/Aggression Toward Others  Goal: Treatment Goal: Refrain from acts of violence/aggression during length of stay, and demonstrate improved impulse control at the time of discharge  Outcome: Progressing  Goal: Verbalize thoughts and feelings  Description: Interventions:  - Assess and re-assess patient's level of risk, every waking shift  - Engage patient in 1:1 interactions, daily, for a minimum of 15 minutes   - Allow patient to express feelings and frustrations in a safe and non-threatening manner   - Establish rapport/trust with patient   Outcome: Progressing  Goal: Refrain from harming others  Outcome: Progressing  Goal: Refrain from destructive acts on the environment or property  Outcome: Progressing  Goal: Control angry outbursts  Description: Interventions:  - Monitor patient closely, per order  - Ensure early verbal de-escalation  - Monitor prn medication needs  - Set reasonable/therapeutic limits, outline behavioral expectations, and consequences   - Provide a non-threatening milieu, utilizing the least restrictive interventions   Outcome: Progressing  Goal: Attend and participate in unit activities, including therapeutic, recreational, and educational groups  Description: Interventions:  - Provide therapeutic and educational activities daily, encourage attendance and participation, and document same in the medical record   Outcome: Progressing  Goal: Identify appropriate positive anger management techniques  Description: Interventions:  - Offer anger management and coping skills groups   - Staff will provide positive feedback for appropriate anger control  Outcome: Progressing     Problem: Alteration in Orientation  Goal: Treatment Goal: Demonstrate a reduction of confusion and improved orientation to person, place, time and/or situation upon discharge, according to optimum baseline/ability  Outcome: Progressing  Goal: Interact with staff daily  Description: Interventions:  - Assess and re-assess patient's level of orientation  - Engage patient in 1 on 1 interactions, daily, for a minimum of 15 minutes   - Establish rapport/trust with patient   Outcome: Progressing  Goal: Express concerns related to confused thinking related to:  Description: Interventions:  - Encourage patient to express feelings, fears, frustrations, hopes  - Assign consistent caregivers   - [place]/re-orient patient as needed  - Allow comfort items, as appropriate  - Provide visual cues, signs, etc    Outcome: Progressing  Goal: Allow medical examinations, as recommended  Description: Interventions:  - Provide physical/neurological exams and/or referrals, per provider   Outcome: Progressing  Goal: Cooperate with recommended testing/procedures  Description: Interventions:  - Determine need for ancillary testing  - Observe for mental status changes  - Implement falls/precaution protocol   Outcome: Progressing  Goal: Attend and participate in unit activities, including therapeutic, recreational, and educational groups  Description: Interventions:  - Provide therapeutic and educational activities daily, encourage attendance and participation, and document same in the medical record   - Provide appropriate opportunities for reminiscence   - Provide a consistent daily routine   - Encourage family contact/visitation   Outcome: Progressing  Goal: Complete daily ADLs, including personal hygiene independently, as able  Description: Interventions:  - Observe, teach, and assist patient with ADLS  Outcome: Progressing     Problem: Individualized Interventions  Goal: Patient will verbalize appropriate use of telephone within 5 days  Description: Interventions:  - Treatment team to determine use of supervised phone privileges   Outcome: Progressing  Goal: Patient will verbalize need for hospitalization and will no longer attempt elopement within 5 days  Description: Interventions:  - Ongoing education to help patient understand need for hospitalization  Outcome: Progressing  Goal: Patient will recognize inappropriate behaviors and develop alternative behaviors within 5 days  Description: Interventions:  - Patient in collaboration with Treatment Team will develop a behavior management plan to help identify effective coping skills to deal with stressors  Outcome: Progressing

## 2021-07-09 NOTE — NURSING NOTE
Pt is medication and meal compliant  Pt is visible on the unit and makes needs known  Pt denies s/s currently, but has irritable edge with redirection to share pt phone time  Will continue to monitor

## 2021-07-09 NOTE — TREATMENT TEAM
07/09/21 1400   Activity/Group Checklist   Group Other (Comment)  (Group Art Therapy/Psychodynamic)   Attendance Attended   Attendance Duration (min) Greater than 60   Interactions Interacted appropriately   Affect/Mood Appropriate   Goals Achieved Able to listen to others; Able to engage in interactions; Able to recieve feedback; Able to give feedback to another

## 2021-07-09 NOTE — PROGRESS NOTES
07/09/21 1328   Team Meeting   Meeting Type Daily Rounds   Initial Conference Date 07/09/21   Patient/Family Present   Patient Present No   Patient's Family Present No       Daily Rounds Documentation  Team Members Participating  MD Dori Sanchez, RN  Negrita Banuelos, DECLANW  MARK ANTHONY Arredondo    Case reviewed  1 yelling episode after her tour, but controlled and subsided quickly  Tour went well per MHT who joined her  Planning on discharge late July at earliest, when [facility] team can open her for services  SW to arrange for [facility] and ACT intake prior, encouraged pt to use last few weeks to prepare for her transition back into the community

## 2021-07-09 NOTE — NURSING NOTE
Patient c/o # 10 back pain and received Tylenol 975 mg at 1725 which reduced the pain to "#2-3" within the hour  She was visible; went outside on the deck  She was calm and pleasant  There were no outbursts of yelling; no behavioral issues   She states she "feels good" No delusional statements made

## 2021-07-10 PROCEDURE — 99232 SBSQ HOSP IP/OBS MODERATE 35: CPT | Performed by: NURSE PRACTITIONER

## 2021-07-10 RX ADMIN — HALOPERIDOL 1 MG: 1 TABLET ORAL at 17:08

## 2021-07-10 RX ADMIN — OLANZAPINE 10 MG: 10 TABLET, FILM COATED ORAL at 21:26

## 2021-07-10 RX ADMIN — PHENYTOIN 1 MG: 125 SUSPENSION ORAL at 21:26

## 2021-07-10 RX ADMIN — HALOPERIDOL 1 MG: 1 TABLET ORAL at 08:44

## 2021-07-10 RX ADMIN — PSYLLIUM HUSK 1 PACKET: 3.4 POWDER ORAL at 08:45

## 2021-07-10 RX ADMIN — HALOPERIDOL 1 MG: 1 TABLET ORAL at 21:26

## 2021-07-10 RX ADMIN — GABAPENTIN 600 MG: 300 CAPSULE ORAL at 12:19

## 2021-07-10 RX ADMIN — GABAPENTIN 600 MG: 300 CAPSULE ORAL at 17:09

## 2021-07-10 RX ADMIN — LORAZEPAM 0.5 MG: 0.5 TABLET ORAL at 21:26

## 2021-07-10 RX ADMIN — HALOPERIDOL 1 MG: 1 TABLET ORAL at 12:19

## 2021-07-10 RX ADMIN — FENOFIBRATE 145 MG: 145 TABLET ORAL at 08:45

## 2021-07-10 RX ADMIN — GABAPENTIN 600 MG: 300 CAPSULE ORAL at 21:26

## 2021-07-10 RX ADMIN — LORAZEPAM 0.5 MG: 0.5 TABLET ORAL at 12:19

## 2021-07-10 RX ADMIN — BENZTROPINE MESYLATE 1 MG: 1 TABLET ORAL at 17:08

## 2021-07-10 RX ADMIN — GABAPENTIN 600 MG: 300 CAPSULE ORAL at 08:44

## 2021-07-10 RX ADMIN — LIDOCAINE 1 PATCH: 50 PATCH CUTANEOUS at 08:48

## 2021-07-10 RX ADMIN — BENZTROPINE MESYLATE 1 MG: 1 TABLET ORAL at 08:44

## 2021-07-10 RX ADMIN — DIPHENHYDRAMINE HCL 25 MG: 25 TABLET ORAL at 21:26

## 2021-07-10 RX ADMIN — SERTRALINE HYDROCHLORIDE 100 MG: 100 TABLET ORAL at 08:44

## 2021-07-10 RX ADMIN — NICOTINE 1 PATCH: 21 PATCH, EXTENDED RELEASE TRANSDERMAL at 08:49

## 2021-07-10 RX ADMIN — LORAZEPAM 0.5 MG: 0.5 TABLET ORAL at 08:44

## 2021-07-10 RX ADMIN — OLANZAPINE 15 MG: 5 TABLET, FILM COATED ORAL at 08:44

## 2021-07-10 RX ADMIN — PANTOPRAZOLE SODIUM 40 MG: 40 TABLET, DELAYED RELEASE ORAL at 05:54

## 2021-07-10 RX ADMIN — LORAZEPAM 0.5 MG: 0.5 TABLET ORAL at 17:08

## 2021-07-10 NOTE — PLAN OF CARE
Problem: Alteration in Thoughts and Perception  Goal: Refrain from acting on delusional thinking/internal stimuli  Description: Interventions:  - Monitor patient closely, per order   - Utilize least restrictive measures   - Set reasonable limits, give positive feedback for acceptable   - Administer medications as ordered and monitor of potential side effects  Outcome: Progressing  Goal: Agree to be compliant with medication regime, as prescribed and report medication side effects  Description: Interventions:  - Offer appropriate PRN medication and supervise ingestion; conduct AIMS, as needed   Outcome: Progressing

## 2021-07-10 NOTE — NURSING NOTE
Alert, cooperative and visible intermittently  No SI or HI noted  Denies depression, anxiety and pain  Attended [employer], journaling and  Fresh air groups  Consumed 100% of breakfast and 100% of lunch  Took all medication without prompting  Maintained on safe precautions without incident   Will continue to monitor progress and recovery program

## 2021-07-10 NOTE — NURSING NOTE
Pt in bed eyes closed chest noted to be rising audible breath sounds will monitor for change in behavior/assessment with q 7 min rounding checks,

## 2021-07-10 NOTE — PROGRESS NOTES
Progress Note - Behavioral Health     Arnoldo Perez 49 y o  female MRN: [id number]   [facility] Encounter: [id number]      Nimo Castillo  Was seen today for continuation of care, records reviewed and patient was discussed with nursing team   Per nursing report patient had 1 outburst yesterday on day shift however she was easily redirected  She did attend outside groups yesterday and was supportive to peers  She has been medication and meal compliant  She has been showering daily  She did receive Tylenol yesterday for back pain however she has not been having any significant worries or complaints  Upon approach today Nimo Castillo is calm cooperative and pleasant she reports this provider that her only worry or concern is "I worry about the voices coming back and breaking me down again other than that I feel good  "  We reviewed the importance of maintaining medications as prescribed  She verbalizes understanding  Today she denies any feelings of depression or anxiety rating both at a 0/10, 10 being the worst   She reports no side effects to her current medication regimen  She states that groups are going well, she denies any active auditory or visual hallucinations, she denies suicidal or homicidal ideation  She reports that her sleep has been good        Medication side effects: No   ROS: reports back pain, all other systems are negative    Mental Status Evaluation:    Appearance:  age appropriate, casually dressed, adequate grooming   Behavior:  pleasant, cooperative, calm, good eye contact   Speech:  mildly pressured,  Slightly increased rate   Mood:  mildly anxious   Affect:  labile   Thought Process:  goal directed   Associations: circumstantial associations   Thought Content:  mild paranoia, intrusive thoughts   Perceptual Disturbances: denies auditory or visual hallucinations when asked, does not appear responding to internal stimuli   Risk Potential: Suicidal ideation - None  Homicidal ideation - None  Potential for aggression - Not at present   Sensorium:  oriented to person, place and time/date   Memory:  recent and remote memory grossly intact   Consciousness:  alert and awake   Attention: attention span and concentration are age appropriate   Insight:  moderate   Judgment: moderate   Gait/Station: normal gait/station, normal balance   Motor Activity: no abnormal movements     Vital signs in last 24 hours:    Temp:  [97 1 °F (36 2 °C)-97 6 °F (36 4 °C)] 97 1 °F (36 2 °C)  HR:  [68-82] 68  Resp:  [18] 18  BP: (101-124)/(59-67) 124/67    Laboratory results:  No new labs to review    Suicide/Homicide Risk Assessment:    Risk of Harm to Self:   Nursing Suicide Risk Assessment Last 24 hours: C-SSRS Risk (Since Last Contact)  Calculated C-SSRS Risk Score (Since Last Contact): No Risk Indicated    Risk of Harm to Others:  Nursing Homicide Risk Assessment: Violence Risk to Others: Denies within past 6 months    The following interventions are recommended: behavioral checks every 7 minutes, continued hospitalization on locked unit    Progress Toward Goals: making gradual improvement    Assessment/Plan   Principal Problem:    Schizoaffective disorder, bipolar type ([facility] [address] )  Active Problems:    Post-traumatic stress disorder, chronic    Medical clearance for psychiatric admission    Mild intermittent asthma without complication    Ear problem, bilateral    Gastroesophageal reflux disease    Right foot pain    Ear problem, right    Recommended Treatment:     Planned medication and treatment changes: All current active medications have been reviewed  Encourage group therapy, milieu therapy and occupational therapy  Behavioral Health checks every 7 minutes  Per chart review plan to discharge to group home, patient did tour group home on Friday which went well    Continue current medications:  Current Facility-Administered Medications   Medication Dose Route Frequency Provider Last Rate    acetaminophen  650 mg Oral Q6H PRN Chloe Shall, MD      acetaminophen  650 mg Oral Q4H PRN Chloe Shall, MD      acetaminophen  975 mg Oral Q6H PRN Chloe Shall, MD      al mag oxide-diphenhydramine-lidocaine viscous  10 mL Swish & Swallow Q4H PRN Chloe Shall, MD      albuterol  2 puff Inhalation Q6H PRN Chloe Shall, MD      aluminum-magnesium hydroxide-simethicone  15 mL Oral Q4H PRN Chloe Shall, MD      benztropine  1 mg Intramuscular Q4H PRN Max 6/day Chloe Shall, MD      benztropine  1 mg Oral Q4H PRN Max 6/day Chloe Shall, MD      benztropine  1 mg Oral BID Chloe Shall, MD      calcium carbonate  500 mg Oral TID PRN Chloe Shall, MD      Diclofenac Sodium  2 g Topical 4x Daily PRN Chloe Shall, MD      diphenhydrAMINE  25 mg Oral HS Chloe Shall, MD      fenofibrate  145 mg Oral Daily Chloe Shall, MD      gabapentin  600 mg Oral 4x Daily Chloe Shall, MD      haloperidol  1 mg Oral 4x Daily Chloe Shall, MD      haloperidol  5 mg Oral Q6H PRN Chloe Shall, MD      hydrOXYzine HCL  25 mg Oral Q6H PRN Max 4/day Chloe Shall, MD      hydrOXYzine HCL  50 mg Oral Q6H PRN Max 4/day Chloe Shall, MD      lidocaine  1 patch Topical Daily Chloe Shall, MD      LORazepam  1 mg Intramuscular Q6H PRN Chloe Shall, MD      LORazepam  0 5 mg Oral 4x Daily Chloe Shall, MD      magnesium hydroxide  30 mL Oral Daily PRN Chloe Shall, MD      methocarbamol  500 mg Oral Q8H PRN Chloe Shall, MD      nicotine  1 patch Transdermal Daily Chloe Shall, MD      nicotine polacrilex  4 mg Oral Q2H PRN Chloe Shall, MD      OLANZapine  2 5 mg Oral Q8H PRN Chloe Shall, MD      OLANZapine  5 mg Oral Q3H PRN Chloe Shall, MD      OLANZapine  7 5 mg Oral Q3H PRN Chloe Shall, MD      OLANZapine  10 mg Intramuscular Q3H PRN Chloe Shall, MD      OLANZapine  5 mg Intramuscular Q3H PRN Chloe Shall, MD      OLANZapine  10 mg Oral HS Chloe Shall, MD      OLANZapine  15 mg Oral Daily Chloe Shall, MD      paliperidone palmitate ER  234 mg Intramuscular Q30 Days Hedii Dunn Pramod Alaniz MD      pantoprazole  40 mg Oral Early Morning Tootie Ames MD      Pramox-PE-Glycerin-Petrolatum  1 application Rectal 4x Daily PRN Tootie Ames MD      prazosin  1 mg Oral HS Tootie Ames MD      psyllium  1 packet Oral Daily Tootie Ames MD      sertraline  100 mg Oral Daily Tootie Ames MD         Risks / Benefits of Treatment:    Risks, benefits, and possible side effects of medications explained to patient and patient verbalizes understanding and agreement for treatment  Counseling / Coordination of Care:    Patient's progress reviewed with nursing staff  Medications, treatment progress and treatment plan reviewed with patient  Supportive counseling provided to the patient      This note was not shared with the patient due to reasonable likelihood of causing patient harm

## 2021-07-11 PROCEDURE — 99232 SBSQ HOSP IP/OBS MODERATE 35: CPT | Performed by: NURSE PRACTITIONER

## 2021-07-11 RX ADMIN — LORAZEPAM 0.5 MG: 0.5 TABLET ORAL at 21:11

## 2021-07-11 RX ADMIN — HALOPERIDOL 1 MG: 1 TABLET ORAL at 17:03

## 2021-07-11 RX ADMIN — HALOPERIDOL 1 MG: 1 TABLET ORAL at 21:11

## 2021-07-11 RX ADMIN — PHENYTOIN 1 MG: 125 SUSPENSION ORAL at 21:11

## 2021-07-11 RX ADMIN — PSYLLIUM HUSK 1 PACKET: 3.4 POWDER ORAL at 08:36

## 2021-07-11 RX ADMIN — PANTOPRAZOLE SODIUM 40 MG: 40 TABLET, DELAYED RELEASE ORAL at 06:03

## 2021-07-11 RX ADMIN — BENZTROPINE MESYLATE 1 MG: 1 TABLET ORAL at 17:03

## 2021-07-11 RX ADMIN — LIDOCAINE 1 PATCH: 50 PATCH CUTANEOUS at 08:37

## 2021-07-11 RX ADMIN — NICOTINE 1 PATCH: 21 PATCH, EXTENDED RELEASE TRANSDERMAL at 08:38

## 2021-07-11 RX ADMIN — LORAZEPAM 0.5 MG: 0.5 TABLET ORAL at 17:03

## 2021-07-11 RX ADMIN — OLANZAPINE 10 MG: 10 TABLET, FILM COATED ORAL at 21:10

## 2021-07-11 RX ADMIN — HALOPERIDOL 1 MG: 1 TABLET ORAL at 12:22

## 2021-07-11 RX ADMIN — GABAPENTIN 600 MG: 300 CAPSULE ORAL at 17:04

## 2021-07-11 RX ADMIN — DIPHENHYDRAMINE HCL 25 MG: 25 TABLET ORAL at 21:11

## 2021-07-11 RX ADMIN — FENOFIBRATE 145 MG: 145 TABLET ORAL at 08:36

## 2021-07-11 RX ADMIN — BENZTROPINE MESYLATE 1 MG: 1 TABLET ORAL at 08:36

## 2021-07-11 RX ADMIN — LORAZEPAM 0.5 MG: 0.5 TABLET ORAL at 08:35

## 2021-07-11 RX ADMIN — SERTRALINE HYDROCHLORIDE 100 MG: 100 TABLET ORAL at 08:36

## 2021-07-11 RX ADMIN — OLANZAPINE 15 MG: 5 TABLET, FILM COATED ORAL at 08:35

## 2021-07-11 RX ADMIN — GABAPENTIN 600 MG: 300 CAPSULE ORAL at 08:36

## 2021-07-11 RX ADMIN — METHOCARBAMOL TABLETS 500 MG: 500 TABLET, COATED ORAL at 15:45

## 2021-07-11 RX ADMIN — LORAZEPAM 0.5 MG: 0.5 TABLET ORAL at 12:23

## 2021-07-11 RX ADMIN — GABAPENTIN 600 MG: 300 CAPSULE ORAL at 12:23

## 2021-07-11 RX ADMIN — GABAPENTIN 600 MG: 300 CAPSULE ORAL at 21:11

## 2021-07-11 RX ADMIN — HALOPERIDOL 1 MG: 1 TABLET ORAL at 08:36

## 2021-07-11 NOTE — NURSING NOTE
Polite, cooperative, visible, attended wrap up group, compliant with routine medications, behavior controlled, will continue to monitor

## 2021-07-11 NOTE — PROGRESS NOTES
Progress Note - Behavioral Health     Anita Blakely 49 y o  female MRN: [id number]   Unit/Bed#: [facility] 112-01 Encounter: [id number]    Behavior over the last 24 hours: slowly improving  Harris  Was seen today for continuation of care, records reviewed and patient was discussed with nursing team   Per nursing report patient has done well overnight, she has been taking medication denies side effects, she has not had any outbursts, she has been attending groups without issue  Upon approach today Harris is bright in affect engaged in conversation logical in thought process  She states that she is eating well, sleeping well she denies side effects to medication    She states that she is "ready for that group home  "  She reports both depression and anxiety both at a 0/10, 10 being worst to denies suicidal or homicidal ideation, she denies any auditory visual hallucinations, she denies delusional thinking she reports mood as "good  "     Sleep: normal  Appetite: normal  Medication side effects: No   ROS: no complaints, all other systems are negative    Mental Status Evaluation:    Appearance:  casually dressed, dressed appropriately, adequate grooming   Behavior:  pleasant, cooperative, calm, good eye contact   Speech:  normal rate, normal volume, normal pitch   Mood:  euthymic   Affect:  normal range and intensity, appropriate   Thought Process:  goal directed, linear   Associations: intact associations   Thought Content:  no overt delusions   Perceptual Disturbances: denies auditory or visual hallucinations when asked, does not appear responding to internal stimuli   Risk Potential: Suicidal ideation - None  Homicidal ideation - None  Potential for aggression - No   Sensorium:  oriented to person, place and time/date   Memory:  recent and remote memory grossly intact   Consciousness:  alert and awake   Attention: attention span and concentration are age appropriate   Insight:  moderate   Judgment: moderate   Gait/Station: normal gait/station, normal balance   Motor Activity: no abnormal movements     Vital signs in last 24 hours:    Temp:  [97 5 °F (36 4 °C)-97 9 °F (36 6 °C)] 97 5 °F (36 4 °C)  HR:  [63-93] 81  Resp:  [16-18] 18  BP: (106-119)/(63-73) 119/73    Laboratory results:  No new labs to review today    Suicide/Homicide Risk Assessment:    Risk of Harm to Self:   Nursing Suicide Risk Assessment Last 24 hours: C-SSRS Risk (Since Last Contact)  Calculated C-SSRS Risk Score (Since Last Contact): No Risk Indicated    Risk of Harm to Others:  Nursing Homicide Risk Assessment: Violence Risk to Others: Denies within past 6 months    The following interventions are recommended: behavioral checks every 7 minutes, continued hospitalization on locked unit    Progress Toward Goals: improving    Assessment/Plan   Principal Problem:    Schizoaffective disorder, bipolar type (HCC)  Active Problems:    Post-traumatic stress disorder, chronic    Medical clearance for psychiatric admission    Mild intermittent asthma without complication    Ear problem, bilateral    Gastroesophageal reflux disease    Right foot pain    Ear problem, right    Recommended Treatment:     Planned medication and treatment changes: All current active medications have been reviewed  Encourage group therapy, milieu therapy and occupational therapy  Behavioral Health checks every 7 minutes  -Per chart review plan to discharge to group home when bed available    Continue current medications:  Current Facility-Administered Medications   Medication Dose Route Frequency Provider Last Rate    acetaminophen  650 mg Oral Q6H PRN Giovani Leavitt MD      acetaminophen  650 mg Oral Q4H PRN Giovani Leavitt MD      acetaminophen  975 mg Oral Q6H PRN Giovani Leaivtt MD      al mag oxide-diphenhydramine-lidocaine viscous  10 mL Swish & Swallow Q4H PRN Giovani Leavitt MD      albuterol  2 puff Inhalation Q6H PRN Giovani Leavitt MD      aluminum-magnesium hydroxide-simethicone  15 mL Oral Q4H PRN Charlie Smith MD      benztropine  1 mg Intramuscular Q4H PRN Max 6/day Charlie Smith MD      benztropine  1 mg Oral Q4H PRN Max 6/day Charlie Smith MD      benztropine  1 mg Oral BID Charlie Smith MD      calcium carbonate  500 mg Oral TID PRN Charlie Smith MD      Diclofenac Sodium  2 g Topical 4x Daily PRN Charlie Smith MD      diphenhydrAMINE  25 mg Oral HS Charlie Smith MD      fenofibrate  145 mg Oral Daily Charlie Smith MD      gabapentin  600 mg Oral 4x Daily Charlie Smith MD      haloperidol  1 mg Oral 4x Daily Charlie Smith MD      haloperidol  5 mg Oral Q6H PRN Charlie Smith MD      hydrOXYzine HCL  25 mg Oral Q6H PRN Max 4/day Charlie Smith MD      hydrOXYzine HCL  50 mg Oral Q6H PRN Max 4/day Charlie Smith MD      lidocaine  1 patch Topical Daily Charlie Smith MD      LORazepam  1 mg Intramuscular Q6H PRN Charlie Smith MD      LORazepam  0 5 mg Oral 4x Daily Charlie Smith MD      magnesium hydroxide  30 mL Oral Daily PRN Charlie Smith MD      methocarbamol  500 mg Oral Q8H PRN Charlie Smith MD      nicotine  1 patch Transdermal Daily Charlie Smith MD      nicotine polacrilex  4 mg Oral Q2H PRN Charlie Smith MD      OLANZapine  2 5 mg Oral Q8H PRN Charlie Smith MD      OLANZapine  5 mg Oral Q3H PRN Charlie Smith MD      OLANZapine  7 5 mg Oral Q3H PRN Charlie Smith MD      OLANZapine  10 mg Intramuscular Q3H PRN Charlie Smith MD      OLANZapine  5 mg Intramuscular Q3H PRN Charlie Smith MD      OLANZapine  10 mg Oral HS Charlie Smith MD      OLANZapine  15 mg Oral Daily Charlie Smith MD      paliperidone palmitate ER  234 mg Intramuscular Q30 Days Charlie Smith MD      pantoprazole  40 mg Oral Early Morning Charlie Smith MD      Pramox-PE-Glycerin-Petrolatum  1 application Rectal 4x Daily PRN Charlie Smith MD      prazosin  1 mg Oral HS Charlie Smith MD      psyllium  1 packet Oral Daily Charlie Smith MD      sertraline  100 mg Oral Daily Charlie Smith MD         Risks / Benefits of Treatment:    Risks, benefits, and possible side effects of medications explained to patient and patient verbalizes understanding and agreement for treatment  Counseling / Coordination of Care:    Patient's progress reviewed with nursing staff  Medications, treatment progress and treatment plan reviewed with patient  Supportive counseling provided to the patient      This note was not shared with the patient due to reasonable likelihood of causing patient harm

## 2021-07-11 NOTE — NURSING NOTE
Alert, cooperative and visible intermittently  No SI or HI noted  Denies depression, anxiety and pain  Pt loud and yelling out in dining room this am but was redirected by MHT  Attended community meeting, journaling and fresh air group  Consumed 100% of breakfast and 100% of lunch  Took all medication without prompting  Maintained on safe precautions without incident   Will continue to monitor progress and recovery program

## 2021-07-11 NOTE — NURSING NOTE
Pt in beginning of shift noted yelling out from room  Pt was redirectable and is currently outside for fresh air group  Alert, cooperative and visible intermittently  Consumed 100% of dinner  Took all medication without prompting  Maintained on safe precautions without incident

## 2021-07-11 NOTE — PROGRESS NOTES
07/10/21 1300   Activity/Group Checklist   Group Other (Comment)  (OPEN STUDIO Art Therapy/Social Group, Interaction/Expression)   Attendance Attended   Attendance Duration (min) 46-60   Interactions Interacted appropriately   Affect/Mood Appropriate   Goals Achieved Able to listen to others; Able to engage in interactions

## 2021-07-12 PROCEDURE — 99232 SBSQ HOSP IP/OBS MODERATE 35: CPT | Performed by: PSYCHIATRY & NEUROLOGY

## 2021-07-12 RX ADMIN — HALOPERIDOL 1 MG: 1 TABLET ORAL at 09:20

## 2021-07-12 RX ADMIN — LORAZEPAM 0.5 MG: 0.5 TABLET ORAL at 22:07

## 2021-07-12 RX ADMIN — SERTRALINE HYDROCHLORIDE 100 MG: 100 TABLET ORAL at 09:19

## 2021-07-12 RX ADMIN — FENOFIBRATE 145 MG: 145 TABLET ORAL at 09:19

## 2021-07-12 RX ADMIN — HALOPERIDOL 1 MG: 1 TABLET ORAL at 12:35

## 2021-07-12 RX ADMIN — LORAZEPAM 0.5 MG: 0.5 TABLET ORAL at 17:16

## 2021-07-12 RX ADMIN — OLANZAPINE 10 MG: 10 TABLET, FILM COATED ORAL at 22:07

## 2021-07-12 RX ADMIN — BENZTROPINE MESYLATE 1 MG: 1 TABLET ORAL at 09:19

## 2021-07-12 RX ADMIN — LORAZEPAM 0.5 MG: 0.5 TABLET ORAL at 09:21

## 2021-07-12 RX ADMIN — GABAPENTIN 600 MG: 300 CAPSULE ORAL at 09:20

## 2021-07-12 RX ADMIN — GABAPENTIN 600 MG: 300 CAPSULE ORAL at 22:07

## 2021-07-12 RX ADMIN — GABAPENTIN 600 MG: 300 CAPSULE ORAL at 17:16

## 2021-07-12 RX ADMIN — ACETAMINOPHEN 650 MG: 325 TABLET ORAL at 15:48

## 2021-07-12 RX ADMIN — PANTOPRAZOLE SODIUM 40 MG: 40 TABLET, DELAYED RELEASE ORAL at 05:54

## 2021-07-12 RX ADMIN — HALOPERIDOL 1 MG: 1 TABLET ORAL at 17:17

## 2021-07-12 RX ADMIN — OLANZAPINE 15 MG: 5 TABLET, FILM COATED ORAL at 09:20

## 2021-07-12 RX ADMIN — LIDOCAINE 1 PATCH: 50 PATCH CUTANEOUS at 09:21

## 2021-07-12 RX ADMIN — PSYLLIUM HUSK 1 PACKET: 3.4 POWDER ORAL at 09:21

## 2021-07-12 RX ADMIN — PHENYTOIN 1 MG: 125 SUSPENSION ORAL at 22:07

## 2021-07-12 RX ADMIN — DIPHENHYDRAMINE HCL 25 MG: 25 TABLET ORAL at 22:08

## 2021-07-12 RX ADMIN — NICOTINE 1 PATCH: 21 PATCH, EXTENDED RELEASE TRANSDERMAL at 09:21

## 2021-07-12 RX ADMIN — HALOPERIDOL 1 MG: 1 TABLET ORAL at 22:07

## 2021-07-12 RX ADMIN — LORAZEPAM 0.5 MG: 0.5 TABLET ORAL at 12:35

## 2021-07-12 RX ADMIN — GABAPENTIN 600 MG: 300 CAPSULE ORAL at 12:35

## 2021-07-12 RX ADMIN — BENZTROPINE MESYLATE 1 MG: 1 TABLET ORAL at 17:16

## 2021-07-12 NOTE — PLAN OF CARE
Problem: Alteration in Thoughts and Perception  Goal: Verbalize thoughts and feelings  Description: Interventions:  - Promote a nonjudgmental and trusting relationship with the patient through active listening and therapeutic communication  - Assess patient's level of functioning, behavior and potential for risk  - Engage patient in 1 on 1 interactions  - Encourage patient to express fears, feelings, frustrations, and discuss symptoms    - [place] patient to reality, help patient recognize reality-based thinking   - Administer medications as ordered and assess for potential side effects  - Provide the patient education related to the signs and symptoms of the illness and desired effects of prescribed medications  Outcome: Progressing     Problem: Ineffective Coping  Goal: Participates in unit activities  Description: Interventions:  - Provide therapeutic environment   - Provide required programming   - Redirect inappropriate behaviors   Outcome: Progressing   Patient completed Goal Card for the week of 7/12/22    Goals: make it to all groups              Walk for coping skills              [place]izerock/meditate for peace

## 2021-07-12 NOTE — NURSING NOTE
Patient is anxious about being accepted by the group home she interviewed with  She c/o headache and initially requested something for that "and a prn" She was reminded that she gets Ativan, Neurontin, and Haldol soon so she agreed to take the Tylenol only  She received 650 mg for #6 headache at 1550 which reduced headache to #2 within the hour   Her behavior was in control: she was sympathetic when her roommate was manic and acting out in the room (roommate was finally taken to observation room and restrained)

## 2021-07-12 NOTE — PROGRESS NOTES
Psychiatry Progress Note [facility] 49 y o  female MRN: [id number]  Unit/Bed#: [facility] 825-00 Encounter: [id number]  Code Status: Level 1 - Full Code    PCP: Hugo Beckett DO    Date of Admission:  4/7/2021 1435   Date of Service:  07/12/21    Patient Active Problem List   Diagnosis    Schizoaffective disorder, bipolar type ([facility] )    Uncomplicated alcohol dependence ([facility] )    Suicidal behavior    LYNN (generalized anxiety disorder)    Slow transit constipation    Deficiency of vitamin B    Degenerative disc disease, lumbar    Post-traumatic stress disorder, chronic    Lower extremity weakness    Generalized abdominal pain    Non-intractable vomiting    Medical clearance for psychiatric admission    Carbuncle    Alcohol dependence with unspecified alcohol-induced disorder ([facility] )    Mild intermittent asthma without complication    Tobacco abuse    Ear problem, bilateral    Gastroesophageal reflux disease    Right foot pain    Ear problem, right    Injury of head    Acute sinusitis    Chlamydial cervicitis     Review of systems:     unremarkable  Diagnosis:            Schizoaffective bipolar, alcohol abuse in early remission   Assessment   Overall Status:       Waiting  For CSP and eventual discharge to group home    Certification Statement: The patient will continue to require additional inpatient hospital stay due to ongoing mood swings paranoia   Acceptance by patient:  accepting    Hopefulness in recovery:  Living in a group home   Understanding of medications: has good understanding   Involved in reintegration process:  Adjusting to the unit   Trusting in relationship with psychiatrist:  trusting   Justification for dual anti-psychotics: due to lack of response to single antipsychotics   Side effects from treatment: none    Medication changes    none today  Medication Education;  Risks and S/E and precautions of all meds given to patient and she did verbalize an understanding   Non-pharmacological treatments   Continue with individual, group, milieu and occupational therapy using recovery principles and psycho-education about accepting illness and the need for treatment  Safety   Safety and communication plan established to target dynamic risk factors discussed above  Discharge Plan     most likely to a group home with the act team again     Interval Progress       Awaiting placement at [facility] after a CSP meeting is held, with no remarkable out bursts except on ahandful of occasions , excited about discharge to group home, still with underlying delusions, continues to attend groups, well groomed, Still believes that people are stalking him and receiving messages from TV but does not  bring it up unless asked and still questions if staff is hypnotizing her   Attending groups with no need for prns      Sleep:                            good   appetite:                        good   compliance with medications :         good   Side  Effects:                                    None reported   significant events:                             improving   group attendance:                            Attending most of the groups    Mental Status Exam  Appearance: age appropriate, casually dressed, looks older than stated age, overweight    Friendly and pleasant when approached, constipating discharge to the [facility] group home in [facility]  Behavior: normal, pleasant, cooperative, appears anxious, mildly anxious,  more friendly today and not confrontational    Claims she was screaming because of pain on her leg and not because the voices over the week    Speech: fluent, clear, coherent, increased rate, pressured, hypertalkative,   Circumstantial pressured    Mood: depressed, anxious, euphoric  Affect: labile, reactive, slightly brighter    Thought Process: normal abstract reasoning, less prominent, tends to be pressured and circumstantial and preoccupied perseverating on discharge    Thought Content: some paranoia, ideas of reference, but does appear as if paranoid  No current suicidal homicidal thoughts intent or plans verbalized  No phobias obsessions compulsions or distorted body perceptions elicited  Also believes TV is sending her messages from movie stars checking her if she is good or bad and threatening to get the feds to go after her  Excited about possible discharge in the near future    Still believes people are stalking her from upstairs   Perceptual Disturbances: no auditory hallucinations, no visual hallucinations, denies auditory hallucinations when asked, does not appear responding to internal stimuli, no voices reported today  no voices reported today  Hx Risk Factors: chronic mood disorder, chronic psychotic symptoms, alcohol use, limted insight  Sensorium:      Oriented to 3 spheres and to situation  Cognition: recent and remote memory grossly intact  Consciousness: alert and awake  Attention: attention span and concentration are age appropriate  Intellect: appears to be of average intelligence  Insight: improving  Judgement: improving  Motor Activity: no abnormal movements     Vitals  Temp:  [97 5 °F (36 4 °C)-97 7 °F (36 5 °C)] 97 7 °F (36 5 °C)  HR:  [81-94] 91  Resp:  [17-18] 17  BP: (110-147)/(65-82) 110/65  No intake or output data in the 24 hours ending 07/12/21 0831    Lab Results: No New Labs Available For Today     Current Facility-Administered Medications   Medication Dose Route Frequency Provider Last Rate    acetaminophen  650 mg Oral Q6H PRN Antonia Turner MD      acetaminophen  650 mg Oral Q4H PRN Antonia Turner MD      acetaminophen  975 mg Oral Q6H PRN Antonia Turner MD      al mag oxide-diphenhydramine-lidocaine viscous  10 mL Swish & Swallow Q4H PRN Antonia Turner MD      albuterol  2 puff Inhalation Q6H PRN Antonia Turner MD      aluminum-magnesium hydroxide-simethicone  15 mL Oral Q4H PRN Antonia uTrner MD      benztropine  1 mg Intramuscular Q4H PRN Max 6/day Soledad Del Rio MD      benztropine  1 mg Oral Q4H PRN Max 6/day Soledad Del Rio MD      benztropine  1 mg Oral BID Soledad Del Rio MD      calcium carbonate  500 mg Oral TID PRN Soledad Del Rio MD      Diclofenac Sodium  2 g Topical 4x Daily PRN Soledad Del Rio MD      diphenhydrAMINE  25 mg Oral HS Soledad Del Rio MD      fenofibrate  145 mg Oral Daily Soledad Del Rio MD      gabapentin  600 mg Oral 4x Daily Soledad Del Rio MD      haloperidol  1 mg Oral 4x Daily Soledad Del Rio MD      haloperidol  5 mg Oral Q6H PRN Soledad Del Rio MD      hydrOXYzine HCL  25 mg Oral Q6H PRN Max 4/day Soledad Del Rio MD      hydrOXYzine HCL  50 mg Oral Q6H PRN Max 4/day Soledad Del Rio MD      lidocaine  1 patch Topical Daily Soledad Del Rio MD      LORazepam  1 mg Intramuscular Q6H PRN Soledad Del Rio MD      LORazepam  0 5 mg Oral 4x Daily Soledad Del Rio MD      magnesium hydroxide  30 mL Oral Daily PRN Soledad Del Rio MD      methocarbamol  500 mg Oral Q8H PRN Soledad Del Rio MD      nicotine  1 patch Transdermal Daily Soledad Del Rio MD      nicotine polacrilex  4 mg Oral Q2H PRN Soledad Del Rio MD      OLANZapine  2 5 mg Oral Q8H PRN Soledad Del Rio MD      OLANZapine  5 mg Oral Q3H PRN Soledad Del Rio MD      OLANZapine  7 5 mg Oral Q3H PRN Soledad Del Rio MD      OLANZapine  10 mg Intramuscular Q3H PRN Soledad Del Rio MD      OLANZapine  5 mg Intramuscular Q3H PRN Soledad Del Rio MD      OLANZapine  10 mg Oral HS Soledad Del Rio MD      OLANZapine  15 mg Oral Daily Soledad Del Rio MD      paliperidone palmitate ER  234 mg Intramuscular Q30 Days Soledad Del Rio MD      pantoprazole  40 mg Oral Early Morning Soledad Del Rio MD      Pramox-PE-Glycerin-Petrolatum  1 application Rectal 4x Daily PRN Soledad Del Rio MD      prazosin  1 mg Oral HS Soledad Del Rio MD      psyllium  1 packet Oral Daily Soledad Del Rio MD      sertraline  100 mg Oral Daily Soledad Del Rio MD         Counseling / Coordination of Care: Total floor / unit time spent today 15 minutes  Greater than 50% of total time was spent with the patient and / or family counseling and / or somewhat receptive to supportive listening and teaching positive coping skills to deal with symptom mangement  Patient's Rights, confidentiality and exceptions to confidentiality, use of automated medical record, [facility] staff access to medical record, and consent to treatment reviewed  This note was  not shared with the patient because it might further aggravate her psychiatric condition  This note has been dictated

## 2021-07-12 NOTE — PLAN OF CARE
Problem: Alteration in Thoughts and Perception  Goal: Treatment Goal: Gain control of psychotic behaviors/thinking, reduce/eliminate presenting symptoms and demonstrate improved reality functioning upon discharge  Outcome: Progressing  Goal: Verbalize thoughts and feelings  Description: Interventions:  - Promote a nonjudgmental and trusting relationship with the patient through active listening and therapeutic communication  - Assess patient's level of functioning, behavior and potential for risk  - Engage patient in 1 on 1 interactions  - Encourage patient to express fears, feelings, frustrations, and discuss symptoms    - [place] patient to reality, help patient recognize reality-based thinking   - Administer medications as ordered and assess for potential side effects  - Provide the patient education related to the signs and symptoms of the illness and desired effects of prescribed medications  Outcome: Progressing  Goal: Refrain from acting on delusional thinking/internal stimuli  Description: Interventions:  - Monitor patient closely, per order   - Utilize least restrictive measures   - Set reasonable limits, give positive feedback for acceptable   - Administer medications as ordered and monitor of potential side effects  Outcome: Progressing  Goal: Agree to be compliant with medication regime, as prescribed and report medication side effects  Description: Interventions:  - Offer appropriate PRN medication and supervise ingestion; conduct AIMS, as needed   Outcome: Progressing  Goal: Attend and participate in unit activities, including therapeutic, recreational, and educational groups  Description: Interventions:  -Encourage Visitation and family involvement in care  Outcome: Progressing  Goal: Recognize dysfunctional thoughts, communicate reality-based thoughts at the time of discharge  Description: Interventions:  - Provide medication and psycho-education to assist patient in compliance and developing insight into his/her illness   Outcome: Progressing  Goal: Complete daily ADLs, including personal hygiene independently, as able  Description: Interventions:  - Observe, teach, and assist patient with ADLS  - Monitor and promote a balance of rest/activity, with adequate nutrition and elimination   Outcome: Progressing     Problem: Ineffective Coping  Goal: Cooperates with admission process  Description: Interventions:   - Complete admission process  Outcome: Progressing  Goal: Identifies ineffective coping skills  Outcome: Progressing  Goal: Identifies healthy coping skills  Outcome: Progressing  Goal: Demonstrates healthy coping skills  Outcome: Progressing  Goal: Participates in unit activities  Description: Interventions:  - Provide therapeutic environment   - Provide required programming   - Redirect inappropriate behaviors   Outcome: Progressing  Goal: Patient/Family participate in treatment and DC plans  Description: Interventions:  - Provide therapeutic environment  Outcome: Progressing  Goal: Patient/Family verbalizes awareness of resources  Outcome: Progressing  Goal: Understands least restrictive measures  Description: Interventions:  - Utilize least restrictive behavior  Outcome: Progressing  Goal: Free from restraint events  Description: - Utilize least restrictive measures   - Provide behavioral interventions   - Redirect inappropriate behaviors   Outcome: Progressing     Problem: Risk for Self Injury/Neglect  Goal: Treatment Goal: Remain safe during length of stay, learn and adopt new coping skills, and be free of self-injurious ideation, impulses and acts at the time of discharge  Outcome: Progressing  Goal: Verbalize thoughts and feelings  Description: Interventions:  - Assess and re-assess patient's lethality and potential for self-injury  - Engage patient in 1:1 interactions, daily, for a minimum of 15 minutes  - Encourage patient to express feelings, fears, frustrations, hopes  - Establish rapport/trust with patient   Outcome: Progressing  Goal: Refrain from harming self  Description: Interventions:  - Monitor patient closely, per order  - Develop a trusting relationship  - Supervise medication ingestion, monitor effects and side effects   Outcome: Progressing  Goal: Attend and participate in unit activities, including therapeutic, recreational, and educational groups  Description: Interventions:  - Provide therapeutic and educational activities daily, encourage attendance and participation, and document same in the medical record  - Obtain collateral information, encourage visitation and family involvement in care   Outcome: Progressing  Goal: Recognize maladaptive responses and adopt new coping mechanisms  Outcome: Progressing  Goal: Complete daily ADLs, including personal hygiene independently, as able  Description: Interventions:  - Observe, teach, and assist patient with ADLS  - Monitor and promote a balance of rest/activity, with adequate nutrition and elimination  Outcome: Progressing     Problem: Depression  Goal: Treatment Goal: Demonstrate behavioral control of depressive symptoms, verbalize feelings of improved mood/affect, and adopt new coping skills prior to discharge  Outcome: Progressing  Goal: Verbalize thoughts and feelings  Description: Interventions:  - Assess and re-assess patient's level of risk   - Engage patient in 1:1 interactions, daily, for a minimum of 15 minutes   - Encourage patient to express feelings, fears, frustrations, hopes   Outcome: Progressing  Goal: Refrain from harming self  Description: Interventions:  - Monitor patient closely, per order   - Supervise medication ingestion, monitor effects and side effects   Outcome: Progressing  Goal: Refrain from isolation  Description: Interventions:  - Develop a trusting relationship   - Encourage socialization   Outcome: Progressing  Goal: Refrain from self-neglect  Outcome: Progressing  Goal: Attend and participate in unit activities, including therapeutic, recreational, and educational groups  Description: Interventions:  - Provide therapeutic and educational activities daily, encourage attendance and participation, and document same in the medical record   Outcome: Progressing  Goal: Complete daily ADLs, including personal hygiene independently, as able  Description: Interventions:  - Observe, teach, and assist patient with ADLS  -  Monitor and promote a balance of rest/activity, with adequate nutrition and elimination   Outcome: Progressing     Problem: Anxiety  Goal: Anxiety is at manageable level  Description: Interventions:  - Assess and monitor patient's anxiety level  - Monitor for signs and symptoms (heart palpitations, chest pain, shortness of breath, headaches, nausea, feeling jumpy, restlessness, irritable, apprehensive)  - Collaborate with interdisciplinary team and initiate plan and interventions as ordered    - [place] patient to unit/surroundings  - Explain treatment plan  - Encourage participation in care  - Encourage verbalization of concerns/fears  - Identify coping mechanisms  - Assist in developing anxiety-reducing skills  - Administer/offer alternative therapies  - Limit or eliminate stimulants  Outcome: Progressing     Problem: Risk for Violence/Aggression Toward Others  Goal: Treatment Goal: Refrain from acts of violence/aggression during length of stay, and demonstrate improved impulse control at the time of discharge  Outcome: Progressing  Goal: Verbalize thoughts and feelings  Description: Interventions:  - Assess and re-assess patient's level of risk, every waking shift  - Engage patient in 1:1 interactions, daily, for a minimum of 15 minutes   - Allow patient to express feelings and frustrations in a safe and non-threatening manner   - Establish rapport/trust with patient   Outcome: Progressing  Goal: Refrain from harming others  Outcome: Progressing  Goal: Refrain from destructive acts on the environment or property  Outcome: Progressing  Goal: Control angry outbursts  Description: Interventions:  - Monitor patient closely, per order  - Ensure early verbal de-escalation  - Monitor prn medication needs  - Set reasonable/therapeutic limits, outline behavioral expectations, and consequences   - Provide a non-threatening milieu, utilizing the least restrictive interventions   Outcome: Progressing  Goal: Attend and participate in unit activities, including therapeutic, recreational, and educational groups  Description: Interventions:  - Provide therapeutic and educational activities daily, encourage attendance and participation, and document same in the medical record   Outcome: Progressing  Goal: Identify appropriate positive anger management techniques  Description: Interventions:  - Offer anger management and coping skills groups   - Staff will provide positive feedback for appropriate anger control  Outcome: Progressing     Problem: Alteration in Orientation  Goal: Treatment Goal: Demonstrate a reduction of confusion and improved orientation to person, place, time and/or situation upon discharge, according to optimum baseline/ability  Outcome: Progressing  Goal: Interact with staff daily  Description: Interventions:  - Assess and re-assess patient's level of orientation  - Engage patient in 1 on 1 interactions, daily, for a minimum of 15 minutes   - Establish rapport/trust with patient   Outcome: Progressing  Goal: Express concerns related to confused thinking related to:  Description: Interventions:  - Encourage patient to express feelings, fears, frustrations, hopes  - Assign consistent caregivers   - [place]/re-orient patient as needed  - Allow comfort items, as appropriate  - Provide visual cues, signs, etc    Outcome: Progressing  Goal: Allow medical examinations, as recommended  Description: Interventions:  - Provide physical/neurological exams and/or referrals, per provider   Outcome: Progressing  Goal: Cooperate with recommended testing/procedures  Description: Interventions:  - Determine need for ancillary testing  - Observe for mental status changes  - Implement falls/precaution protocol   Outcome: Progressing  Goal: Attend and participate in unit activities, including therapeutic, recreational, and educational groups  Description: Interventions:  - Provide therapeutic and educational activities daily, encourage attendance and participation, and document same in the medical record   - Provide appropriate opportunities for reminiscence   - Provide a consistent daily routine   - Encourage family contact/visitation   Outcome: Progressing  Goal: Complete daily ADLs, including personal hygiene independently, as able  Description: Interventions:  - Observe, teach, and assist patient with ADLS  Outcome: Progressing     Problem: Individualized Interventions  Goal: Patient will verbalize appropriate use of telephone within 5 days  Description: Interventions:  - Treatment team to determine use of supervised phone privileges   Outcome: Progressing  Goal: Patient will verbalize need for hospitalization and will no longer attempt elopement within 5 days  Description: Interventions:  - Ongoing education to help patient understand need for hospitalization  Outcome: Progressing  Goal: Patient will recognize inappropriate behaviors and develop alternative behaviors within 5 days  Description: Interventions:  - Patient in collaboration with Treatment Team will develop a behavior management plan to help identify effective coping skills to deal with stressors  Outcome: Progressing

## 2021-07-12 NOTE — PROGRESS NOTES
07/12/21 0844   Team Meeting   Meeting Type Daily Rounds   Team Members Present   Team Members Present Physician;Nurse;; Other (Discipline and Name)   Physician Team Member [facility] Team Member Flaco   Social Work Team Member Yahaira   Other (Discipline and Name) Colten(CPS)   Patient/Family Present   Patient Present No   Patient's Family Present No     Attended all groups  Yelling at times
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
## 2021-07-13 PROCEDURE — 99232 SBSQ HOSP IP/OBS MODERATE 35: CPT | Performed by: PSYCHIATRY & NEUROLOGY

## 2021-07-13 RX ADMIN — HALOPERIDOL 1 MG: 1 TABLET ORAL at 08:37

## 2021-07-13 RX ADMIN — LORAZEPAM 0.5 MG: 0.5 TABLET ORAL at 17:12

## 2021-07-13 RX ADMIN — ACETAMINOPHEN 650 MG: 325 TABLET ORAL at 21:31

## 2021-07-13 RX ADMIN — HALOPERIDOL 1 MG: 1 TABLET ORAL at 21:30

## 2021-07-13 RX ADMIN — FENOFIBRATE 145 MG: 145 TABLET ORAL at 08:37

## 2021-07-13 RX ADMIN — LORAZEPAM 0.5 MG: 0.5 TABLET ORAL at 11:57

## 2021-07-13 RX ADMIN — BENZTROPINE MESYLATE 1 MG: 1 TABLET ORAL at 17:11

## 2021-07-13 RX ADMIN — PANTOPRAZOLE SODIUM 40 MG: 40 TABLET, DELAYED RELEASE ORAL at 06:10

## 2021-07-13 RX ADMIN — LORAZEPAM 0.5 MG: 0.5 TABLET ORAL at 21:30

## 2021-07-13 RX ADMIN — OLANZAPINE 15 MG: 5 TABLET, FILM COATED ORAL at 08:37

## 2021-07-13 RX ADMIN — GABAPENTIN 600 MG: 300 CAPSULE ORAL at 08:37

## 2021-07-13 RX ADMIN — PHENYTOIN 1 MG: 125 SUSPENSION ORAL at 21:31

## 2021-07-13 RX ADMIN — GABAPENTIN 600 MG: 300 CAPSULE ORAL at 11:57

## 2021-07-13 RX ADMIN — PSYLLIUM HUSK 1 PACKET: 3.4 POWDER ORAL at 08:36

## 2021-07-13 RX ADMIN — METHOCARBAMOL TABLETS 500 MG: 500 TABLET, COATED ORAL at 15:15

## 2021-07-13 RX ADMIN — NICOTINE 1 PATCH: 21 PATCH, EXTENDED RELEASE TRANSDERMAL at 08:53

## 2021-07-13 RX ADMIN — LIDOCAINE 1 PATCH: 50 PATCH CUTANEOUS at 08:36

## 2021-07-13 RX ADMIN — LORAZEPAM 0.5 MG: 0.5 TABLET ORAL at 08:37

## 2021-07-13 RX ADMIN — OLANZAPINE 10 MG: 10 TABLET, FILM COATED ORAL at 21:30

## 2021-07-13 RX ADMIN — GABAPENTIN 600 MG: 300 CAPSULE ORAL at 21:28

## 2021-07-13 RX ADMIN — BENZTROPINE MESYLATE 1 MG: 1 TABLET ORAL at 08:37

## 2021-07-13 RX ADMIN — HALOPERIDOL 1 MG: 1 TABLET ORAL at 17:12

## 2021-07-13 RX ADMIN — DIPHENHYDRAMINE HCL 25 MG: 25 TABLET ORAL at 21:28

## 2021-07-13 RX ADMIN — GABAPENTIN 600 MG: 300 CAPSULE ORAL at 17:12

## 2021-07-13 RX ADMIN — HALOPERIDOL 1 MG: 1 TABLET ORAL at 11:57

## 2021-07-13 RX ADMIN — SERTRALINE HYDROCHLORIDE 100 MG: 100 TABLET ORAL at 08:36

## 2021-07-13 RX ADMIN — DICLOFENAC SODIUM 2 G: 10 GEL TOPICAL at 10:56

## 2021-07-13 NOTE — PLAN OF CARE
Problem: Nutrition/Hydration-ADULT  Goal: Nutrient/Hydration intake appropriate for improving, restoring or maintaining nutritional needs  Description: Monitor and assess patient's nutrition/hydration status for malnutrition  Collaborate with interdisciplinary team and initiate plan and interventions as ordered  Monitor patient's weight and dietary intake as ordered or per policy  Utilize nutrition screening tool and intervene as necessary  Determine patient's food preferences and provide high-protein, high-caloric foods as appropriate       INTERVENTIONS:  - Monitor oral intake, urinary output, labs, and treatment plans  - Assess nutrition and hydration status and recommend course of action  - Evaluate amount of meals eaten  - Assist patient with eating if necessary   - Allow adequate time for meals  - Recommend/ encourage appropriate diets, oral nutritional supplements, and vitamin/mineral supplements  - Order, calculate, and assess calorie counts as needed  - Recommend, monitor, and adjust tube feedings and TPN/PPN based on assessed needs  - Assess need for intravenous fluids  - Provide specific nutrition/hydration education as appropriate  - Include patient/family/caregiver in decisions related to nutrition  Outcome: Progressing     Problem: Alteration in Thoughts and Perception  Goal: Treatment Goal: Gain control of psychotic behaviors/thinking, reduce/eliminate presenting symptoms and demonstrate improved reality functioning upon discharge  Outcome: Progressing     Problem: Alteration in Thoughts and Perception  Goal: Verbalize thoughts and feelings  Description: Interventions:  - Promote a nonjudgmental and trusting relationship with the patient through active listening and therapeutic communication  - Assess patient's level of functioning, behavior and potential for risk  - Engage patient in 1 on 1 interactions  - Encourage patient to express fears, feelings, frustrations, and discuss symptoms    - [place] patient to reality, help patient recognize reality-based thinking   - Administer medications as ordered and assess for potential side effects  - Provide the patient education related to the signs and symptoms of the illness and desired effects of prescribed medications  Outcome: Progressing     Problem: Alteration in Thoughts and Perception  Goal: Refrain from acting on delusional thinking/internal stimuli  Description: Interventions:  - Monitor patient closely, per order   - Utilize least restrictive measures   - Set reasonable limits, give positive feedback for acceptable   - Administer medications as ordered and monitor of potential side effects  Outcome: Progressing     Problem: Alteration in Thoughts and Perception  Goal: Attend and participate in unit activities, including therapeutic, recreational, and educational groups  Description: Interventions:  -Encourage Visitation and family involvement in care  Outcome: Progressing     Problem: Ineffective Coping  Goal: Identifies ineffective coping skills  Outcome: Progressing       Problem: Ineffective Coping  Goal: Demonstrates healthy coping skills  Outcome: Progressing     Problem: Ineffective Coping  Goal: Understands least restrictive measures  Description: Interventions:  - Utilize least restrictive behavior  Outcome: Progressing     Problem: Risk for Self Injury/Neglect  Goal: Recognize maladaptive responses and adopt new coping mechanisms  Outcome: Progressing     Problem: Risk for Self Injury/Neglect  Goal: Complete daily ADLs, including personal hygiene independently, as able  Description: Interventions:  - Observe, teach, and assist patient with ADLS  - Monitor and promote a balance of rest/activity, with adequate nutrition and elimination  Outcome: Progressing     Problem: Depression  Goal: Treatment Goal: Demonstrate behavioral control of depressive symptoms, verbalize feelings of improved mood/affect, and adopt new coping skills prior to discharge  Outcome: Progressing  Goal: Verbalize thoughts and feelings  Description: Interventions:  - Assess and re-assess patient's level of risk   - Engage patient in 1:1 interactions, daily, for a minimum of 15 minutes   - Encourage patient to express feelings, fears, frustrations, hopes   Outcome: Progressing  Goal: Refrain from harming self  Description: Interventions:  - Monitor patient closely, per order   - Supervise medication ingestion, monitor effects and side effects   Outcome: Progressing  Goal: Refrain from isolation  Description: Interventions:  - Develop a trusting relationship   - Encourage socialization   Outcome: Progressing  Goal: Refrain from self-neglect  Outcome: Progressing  Goal: Attend and participate in unit activities, including therapeutic, recreational, and educational groups  Description: Interventions:  - Provide therapeutic and educational activities daily, encourage attendance and participation, and document same in the medical record   Outcome: Progressing  Goal: Complete daily ADLs, including personal hygiene independently, as able  Description: Interventions:  - Observe, teach, and assist patient with ADLS  -  Monitor and promote a balance of rest/activity, with adequate nutrition and elimination   Outcome: Progressing

## 2021-07-13 NOTE — NURSING NOTE
Pt is med and meal compliant  She has been pleasant and cooperative  Visible on the milieu and social with select peers  No behavioral issues

## 2021-07-13 NOTE — PROGRESS NOTES
07/13/21 0857   Team Meeting   Meeting Type Daily Rounds   Team Members Present   Team Members Present Physician;Nurse;; Other (Discipline and Name)   Physician Team Member [facility] Team Member Flaco   Social Work Team Member Yahaira   Other (Discipline and Name) Colten WOODY   Patient/Family Present   Patient Present No   Patient's Family Present No     Patient is pleasant and cooperative  She attends all groups including AA group  She is waiting on discharge to [address] [place] [address]

## 2021-07-13 NOTE — PROGRESS NOTES
07/13/21 1300   Activity/Group Checklist   Group Other (Comment)  (Group Art Therapy/Open Discussion, Personal Accountability)   Attendance Attended   Attendance Duration (min) 46-60   Interactions Interacted appropriately   Affect/Mood Appropriate   Goals Achieved Able to listen to others; Able to engage in interactions

## 2021-07-13 NOTE — PROGRESS NOTES
Psychiatry Progress Note [facility] 49 y o  female MRN: [id number]  Unit/Bed#: [facility] 899-77 Encounter: [id number]  Code Status: Level 1 - Full Code    PCP: Benigno Dai DO    Date of Admission:  4/7/2021 1435   Date of Service:  07/13/21    Patient Active Problem List   Diagnosis    Schizoaffective disorder, bipolar type ([facility] )    Uncomplicated alcohol dependence ([facility] )    Suicidal behavior    LYNN (generalized anxiety disorder)    Slow transit constipation    Deficiency of vitamin B    Degenerative disc disease, lumbar    Post-traumatic stress disorder, chronic    Lower extremity weakness    Generalized abdominal pain    Non-intractable vomiting    Medical clearance for psychiatric admission    Carbuncle    Alcohol dependence with unspecified alcohol-induced disorder ([facility] )    Mild intermittent asthma without complication    Tobacco abuse    Ear problem, bilateral    Gastroesophageal reflux disease    Right foot pain    Ear problem, right    Injury of head    Acute sinusitis    Chlamydial cervicitis     Review of systems:    Unremarkable except for occasional  Headache for which she did accept Haldol  Diagnosis:            Schizoaffective bipolar and alcohol abuse in early remission  Assessment   Overall Status:       Waiting  For CSP and eventual discharge to group home    Certification Statement: The patient will continue to require additional inpatient hospital stay due to ongoing mood swings paranoia   Acceptance by patient:  accepting    Hopefulness in recovery:  Living in a group home   Understanding of medications: has good understanding   Involved in reintegration process:  Adjusting to the unit   Trusting in relationship with psychiatrist:  trusting   Justification for dual anti-psychotics: due to lack of response to single antipsychotics   Side effects from treatment: none    Medication changes    none today  Medication Education;  Risks and S/E and precautions of all meds given to patient and she did verbalize an understanding   Non-pharmacological treatments   Continue with individual, group, milieu and occupational therapy using recovery principles and psycho-education about accepting illness and the need for treatment  Safety   Safety and communication plan established to target dynamic risk factors discussed above  Discharge Plan     most likely to a group home with the act team again     Interval Progress       Still becomes  Delusional off and on about people putting lipstick on or doing her hair or touching her and unable to explain who is doing it but knows it could be a delusion  Has not been yelling or screaming and attending groups and is excited about proposed discharge in the near future once a CSP meeting is held    She is friendly pleasant polite but still believes people may be doing hypnosis on her and that people may still be stalking her and sending messages from TV     Sleep:                             good   appetite:                          good   compliance with medications :          good   Side  Effects:                                     None reported   significant events:                              improving   group attendance:                              Attending most of the groups 6/5 groups as she attended extra AA zoom meeting    Mental Status Exam  Appearance: age appropriate, casually dressed, looks older than stated age, overweight    Attended team meeting in good spirits  Behavior: normal, pleasant, cooperative, appears anxious, mildly anxious,  more friendly today and not confrontational   Excited about possible discharge to the [employer] group home     Speech: fluent, clear, coherent, increased rate, pressured, hypertalkative,   Circumstantial pressured    Mood: depressed, anxious, euphoric  Affect: labile, reactive, slightly brighter    Thought Process: normal abstract reasoning, less prominent, tends to be pressured and circumstantial and preoccupied perseverating on discharge    Thought Content: some paranoia, ideas of reference, but does appear as if paranoid  No current suicidal homicidal thoughts intent or plans verbalized  No phobias obsessions compulsions or distorted body perceptions elicited  Also believes TV is sending her messages from movie stars checking her if she is good or bad and threatening to get the feds to go after her    Continues to believe people may be hypnotizing her    Perceptual Disturbances: no auditory hallucinations, no visual hallucinations, denies auditory hallucinations when asked, does not appear responding to internal stimuli, no voices reported today no voices reported today   Hx Risk Factors: chronic mood disorder, chronic psychotic symptoms, alcohol use, limted insight  Sensorium:      Oriented to to 3 spheres and to situation   Cognition: recent and remote memory grossly intact  Consciousness: alert and awake  Attention: attention span and concentration are age appropriate  Intellect: appears to be of average intelligence  Insight: improving  Judgement: improving  Motor Activity: no abnormal movements     Vitals  Temp:  [97 7 °F (36 5 °C)-97 8 °F (36 6 °C)] 97 8 °F (36 6 °C)  HR:  [75-91] 75  Resp:  [17] 17  BP: ()/(60-69) 106/60  No intake or output data in the 24 hours ending 07/13/21 0541    Lab Results: No New Labs Available For Today     Current Facility-Administered Medications   Medication Dose Route Frequency Provider Last Rate    acetaminophen  650 mg Oral Q6H PRN Carrie Barrera MD      acetaminophen  650 mg Oral Q4H PRN Carrie Barrera MD      acetaminophen  975 mg Oral Q6H PRN Carrie Barrera MD      al mag oxide-diphenhydramine-lidocaine viscous  10 mL Swish & Swallow Q4H PRN Carrie Barrera MD      albuterol  2 puff Inhalation Q6H PRN Carrie Barrera MD      aluminum-magnesium hydroxide-simethicone  15 mL Oral Q4H PRN Carrie Barrera MD     [address] [place] [address] benztropine  1 mg Intramuscular Q4H PRN Max 6/day Dmitriy Ornelas MD      benztropine  1 mg Oral Q4H PRN Max 6/day Dmitriy Ornelas MD      benztropine  1 mg Oral BID Dmitriy Ornelas MD      calcium carbonate  500 mg Oral TID PRN Dmitriy Ornelas MD      Diclofenac Sodium  2 g Topical 4x Daily PRN Dmitriy Ornelas MD      diphenhydrAMINE  25 mg Oral HS Dmitriy Ornelas MD      fenofibrate  145 mg Oral Daily Dmitriy Ornelas MD      gabapentin  600 mg Oral 4x Daily Dmitriy Ornelas MD      haloperidol  1 mg Oral 4x Daily Dmitriy Ornelas MD      haloperidol  5 mg Oral Q6H PRN Dmitriy Ornelas MD      hydrOXYzine HCL  25 mg Oral Q6H PRN Max 4/day Dmitriy Ornelas MD      hydrOXYzine HCL  50 mg Oral Q6H PRN Max 4/day Dmitriy Ornelas MD      lidocaine  1 patch Topical Daily Dmitriy Ornelas MD      LORazepam  1 mg Intramuscular Q6H PRN Dmitriy Ornelas MD      LORazepam  0 5 mg Oral 4x Daily Dmitriy Ornelas MD      magnesium hydroxide  30 mL Oral Daily PRN Dmitriy Ornelas MD      methocarbamol  500 mg Oral Q8H PRN Dmitriy Ornelas MD      nicotine  1 patch Transdermal Daily Dmitriy Ornelas MD      nicotine polacrilex  4 mg Oral Q2H PRN Dmitriy Ornelas MD      OLANZapine  2 5 mg Oral Q8H PRN Dmitriy Ornelas MD      OLANZapine  5 mg Oral Q3H PRN Dmitriy Ornelas MD      OLANZapine  7 5 mg Oral Q3H PRN Dmitriy Ornelas MD      OLANZapine  10 mg Intramuscular Q3H PRN Dmitriy Ornelas MD      OLANZapine  5 mg Intramuscular Q3H PRN Dmitriy Ornelas MD      OLANZapine  10 mg Oral HS Dmitriy Ornelas MD      OLANZapine  15 mg Oral Daily Dmitriy Ornelas MD      paliperidone palmitate ER  234 mg Intramuscular Q30 Days Dmitriy Ornelas MD      pantoprazole  40 mg Oral Early Morning Dmitriy Ornelas MD      Pramox-PE-Glycerin-Petrolatum  1 application Rectal 4x Daily PRN Dmitriy Ornelas MD      prazosin  1 mg Oral HS Dmitriy Ornelas MD      psyllium  1 packet Oral Daily Dmitriy Ornelas MD      sertraline  100 mg Oral Daily Dmitriy Ornelas MD         Counseling / Coordination of Care:  Total floor / unit time spent today 15 minutes  Greater than 50% of total time was spent with the patient and / or family counseling and / or somewhat receptive to supportive listening and teaching positive coping skills to deal with symptom mangement  Patient's Rights, confidentiality and exceptions to confidentiality, use of automated medical record, [facility] staff access to medical record, and consent to treatment reviewed  This note was  not shared with the patient because it might further aggravate her psychiatric condition  This note has been dictated

## 2021-07-13 NOTE — NURSING NOTE
Pt is med and meal compliant  She is present on the milieu and social with select peers  She stated "I feel like I was drugged and am going to slip into a coma  I am taking the day off " denies depression and pain  Patient had one bout of screaming "I hate you!" repeatedly  Pt calmed herself down in her room

## 2021-07-14 PROCEDURE — 99232 SBSQ HOSP IP/OBS MODERATE 35: CPT | Performed by: PSYCHIATRY & NEUROLOGY

## 2021-07-14 PROCEDURE — 93005 ELECTROCARDIOGRAM TRACING: CPT

## 2021-07-14 RX ADMIN — HALOPERIDOL 1 MG: 1 TABLET ORAL at 11:38

## 2021-07-14 RX ADMIN — SERTRALINE HYDROCHLORIDE 100 MG: 100 TABLET ORAL at 08:33

## 2021-07-14 RX ADMIN — LORAZEPAM 0.5 MG: 0.5 TABLET ORAL at 17:30

## 2021-07-14 RX ADMIN — GABAPENTIN 600 MG: 300 CAPSULE ORAL at 17:30

## 2021-07-14 RX ADMIN — PHENYTOIN 1 MG: 125 SUSPENSION ORAL at 21:20

## 2021-07-14 RX ADMIN — DICLOFENAC SODIUM 2 G: 10 GEL TOPICAL at 10:45

## 2021-07-14 RX ADMIN — BENZTROPINE MESYLATE 1 MG: 1 TABLET ORAL at 08:33

## 2021-07-14 RX ADMIN — OLANZAPINE 10 MG: 10 TABLET, FILM COATED ORAL at 21:20

## 2021-07-14 RX ADMIN — LORAZEPAM 0.5 MG: 0.5 TABLET ORAL at 21:20

## 2021-07-14 RX ADMIN — DIPHENHYDRAMINE HCL 25 MG: 25 TABLET ORAL at 21:20

## 2021-07-14 RX ADMIN — LORAZEPAM 0.5 MG: 0.5 TABLET ORAL at 11:38

## 2021-07-14 RX ADMIN — GABAPENTIN 600 MG: 300 CAPSULE ORAL at 11:38

## 2021-07-14 RX ADMIN — GABAPENTIN 600 MG: 300 CAPSULE ORAL at 21:20

## 2021-07-14 RX ADMIN — LIDOCAINE 1 PATCH: 50 PATCH CUTANEOUS at 08:32

## 2021-07-14 RX ADMIN — NICOTINE 1 PATCH: 21 PATCH, EXTENDED RELEASE TRANSDERMAL at 08:33

## 2021-07-14 RX ADMIN — PANTOPRAZOLE SODIUM 40 MG: 40 TABLET, DELAYED RELEASE ORAL at 06:12

## 2021-07-14 RX ADMIN — PSYLLIUM HUSK 1 PACKET: 3.4 POWDER ORAL at 08:32

## 2021-07-14 RX ADMIN — HALOPERIDOL 1 MG: 1 TABLET ORAL at 17:30

## 2021-07-14 RX ADMIN — GABAPENTIN 600 MG: 300 CAPSULE ORAL at 08:33

## 2021-07-14 RX ADMIN — HALOPERIDOL 1 MG: 1 TABLET ORAL at 21:20

## 2021-07-14 RX ADMIN — FENOFIBRATE 145 MG: 145 TABLET ORAL at 08:33

## 2021-07-14 RX ADMIN — HALOPERIDOL 1 MG: 1 TABLET ORAL at 08:33

## 2021-07-14 RX ADMIN — LORAZEPAM 0.5 MG: 0.5 TABLET ORAL at 08:33

## 2021-07-14 RX ADMIN — BENZTROPINE MESYLATE 1 MG: 1 TABLET ORAL at 17:30

## 2021-07-14 RX ADMIN — METHOCARBAMOL TABLETS 500 MG: 500 TABLET, COATED ORAL at 15:28

## 2021-07-14 RX ADMIN — OLANZAPINE 15 MG: 5 TABLET, FILM COATED ORAL at 08:33

## 2021-07-14 NOTE — PROGRESS NOTES
07/13/21 0950   Team Meeting   Meeting Type Tx Team Meeting   Initial Conference Date 07/13/21   Next Conference Date 08/13/21   Team Members Present   Team Members Present Physician;Nurse;; Other (Discipline and Name)   Physician Team Member [id number] Team Member Flaco   Social Work Team Member Yahaira   Other (Discipline and Name) [place] CPS   Patient/Family Present   Patient Present Yes   Patient's Family Present No     The treatment team [facility] met with patient to review her treatment plan  This writer reviewed patient's updated treatment plan  Patient is in agreement with treatment plan  Team discussed waiting on the patient's case to open with [facility]  Once this is open patient will then begin the discharge process to go to [facility]  Patient appropriately expressed her concerns  Patient reports mild anxiety related to the process  Patient is compliant with treatment

## 2021-07-14 NOTE — PROGRESS NOTES
Psychiatry Progress Note [facility] 49 y o  female MRN: [id number]  Unit/Bed#: [facility] 490-16 Encounter: [id number]  Code Status: Level 1 - Full Code    PCP: Naty Calvin DO    Date of Admission:  4/7/2021 1435   Date of Service:  07/14/21    Patient Active Problem List   Diagnosis    Schizoaffective disorder, bipolar type ([facility] )    Uncomplicated alcohol dependence ([facility] )    Suicidal behavior    LYNN (generalized anxiety disorder)    Slow transit constipation    Deficiency of vitamin B    Degenerative disc disease, lumbar    Post-traumatic stress disorder, chronic    Lower extremity weakness    Generalized abdominal pain    Non-intractable vomiting    Medical clearance for psychiatric admission    Carbuncle    Alcohol dependence with unspecified alcohol-induced disorder ([facility] )    Mild intermittent asthma without complication    Tobacco abuse    Ear problem, bilateral    Gastroesophageal reflux disease    Right foot pain    Ear problem, right    Injury of head    Acute sinusitis    Chlamydial cervicitis     Review of systems:    Needed Tylenol for ankle pain, otherwise unremarkable and still with occasional back pain  Diagnosis:            Schizoaffective bipolar and alcohol abuse in early remission  Assessment   Overall Status:        Waiting for CSP and eventual discharge to group home   Certification Statement: The patient will continue to require additional inpatient hospital stay due to ongoing mood swings paranoia   Acceptance by patient:  accepting    Hopefulness in recovery:  Living in a group home   Understanding of medications: has good understanding   Involved in reintegration process:  Adjusting to the unit   Trusting in relationship with psychiatrist:  trusting   Justification for dual anti-psychotics: due to lack of response to single antipsychotics   Side effects from treatment: none    Medication changes    none today  Medication Education; Risks and S/E and precautions of all meds given to patient and she did verbalize an understanding   Non-pharmacological treatments   Continue with individual, group, milieu and occupational therapy using recovery principles and psycho-education about accepting illness and the need for treatment  Safety   Safety and communication plan established to target dynamic risk factors discussed above  Discharge Plan     most likely to a group home with the act team again     Interval Progress      Remains preoccupied and was reportedly screaming a few times yesterday but less often no longer hears voices  Still has some delusional believes about being hypnotized by staff at times and people stalking her and talking to her through the vents and TV sending messages    Is excited about proposed discharge in the near future once the act team picks her up when I CSP meeting will be scheduled as she has already accepted by the [facility] in the [place]   Sleep:                             Good   appetite:                           good   compliance with medications :           good   Side  Effects:                                      None reported   significant events:                              improving   group attendance:                               Attending most of the groups    Mental Status Exam  Appearance: age appropriate, casually dressed, looks older than stated age, overweight     In better spirits well groomed  Behavior: normal, pleasant, cooperative, appears anxious, mildly anxious,  more friendly today and not confrontational   Still anticipating to move into the [facility] Speech: fluent, clear, coherent, increased rate, pressured, hypertalkative,   Circumstantial pressured    Mood: depressed, anxious, euphoric  Affect: labile, reactive, slightly brighter    Thought Process: normal abstract reasoning, less prominent, tends to be pressured and circumstantial and preoccupied perseverating on discharge    Thought Content: some paranoia, ideas of reference, but does appear as if paranoid  No current suicidal homicidal thoughts intent or plans verbalized  No phobias obsessions compulsions or distorted body perceptions elicited  Also believes TV is sending her messages from movie stars checking her if she is good or bad and threatening to get the feds to go after her     Still believes staff maybe hypnotized   Perceptual Disturbances: no auditory hallucinations, no visual hallucinations, denies auditory hallucinations when asked, does not appear responding to internal stimuli, no voices reported today   No voices reported today or in the last few days  Hx Risk Factors: chronic mood disorder, chronic psychotic symptoms, alcohol use, limted insight  Sensorium:       Oriented to 3 spheres and to situation  Cognition: recent and remote memory grossly intact  Consciousness: alert and awake  Attention: attention span and concentration are age appropriate  Intellect: appears to be of average intelligence  Insight: improving  Judgement: improving  Motor Activity: no abnormal movements     Vitals  Temp:  [97 6 °F (36 4 °C)-98 2 °F (36 8 °C)] 98 2 °F (36 8 °C)  HR:  [76-86] 86  Resp:  [17-18] 18  BP: (101-113)/(64-68) 101/66  No intake or output data in the 24 hours ending 07/14/21 1026    Lab Results: No New Labs Available For Today     Current Facility-Administered Medications   Medication Dose Route Frequency Provider Last Rate    acetaminophen  650 mg Oral Q6H PRN Nerissa Sanchez MD      acetaminophen  650 mg Oral Q4H PRN Nerissa Sanchez MD      acetaminophen  975 mg Oral Q6H PRN Nerissa Sanchez MD      al mag oxide-diphenhydramine-lidocaine viscous  10 mL Swish & Swallow Q4H PRN Nerissa Sanchez MD      albuterol  2 puff Inhalation Q6H PRN Nerissa Sanchez MD      aluminum-magnesium hydroxide-simethicone  15 mL Oral Q4H PRN Nerissa Sanchez MD      benztropine  1 mg Intramuscular Q4H PRN Max 6/day Yesica Medina MD      benztropine  1 mg Oral Q4H PRN Max 6/day Yesica Medina MD      benztropine  1 mg Oral BID Yesica Medina MD      calcium carbonate  500 mg Oral TID PRN Yesica Medina MD      Diclofenac Sodium  2 g Topical 4x Daily PRN Yesica Medina MD      diphenhydrAMINE  25 mg Oral HS Yesica Medina MD      fenofibrate  145 mg Oral Daily Yesica Medina MD      gabapentin  600 mg Oral 4x Daily Yesica Medina MD      haloperidol  1 mg Oral 4x Daily Yesica Medina MD      haloperidol  5 mg Oral Q6H PRN Yesica Medina MD      hydrOXYzine HCL  25 mg Oral Q6H PRN Max 4/day Yesica Medina MD      hydrOXYzine HCL  50 mg Oral Q6H PRN Max 4/day Yesica Medina MD      lidocaine  1 patch Topical Daily Yesica Medina MD      LORazepam  1 mg Intramuscular Q6H PRN Yesica Medina MD      LORazepam  0 5 mg Oral 4x Daily Yesica Medina MD      magnesium hydroxide  30 mL Oral Daily PRN Yesica Medina MD      methocarbamol  500 mg Oral Q8H PRN Yesica Medina MD      nicotine  1 patch Transdermal Daily Yesica Medina MD      nicotine polacrilex  4 mg Oral Q2H PRN Yesica Medina MD      OLANZapine  2 5 mg Oral Q8H PRN Yesica Medina MD      OLANZapine  5 mg Oral Q3H PRN Yesica Medina MD      OLANZapine  7 5 mg Oral Q3H PRN Yesica Medina MD      OLANZapine  10 mg Intramuscular Q3H PRN Yesica Medina MD      OLANZapine  5 mg Intramuscular Q3H PRN Yesica Medina MD      OLANZapine  10 mg Oral HS Yesica Medina MD      OLANZapine  15 mg Oral Daily Yesica Medina MD      paliperidone palmitate ER  234 mg Intramuscular Q30 Days Yesica Medina MD      pantoprazole  40 mg Oral Early Morning Yesica Medina MD      Pramox-PE-Glycerin-Petrolatum  1 application Rectal 4x Daily PRN Yesica Medina MD      prazosin  1 mg Oral HS Yesica Medina MD      psyllium  1 packet Oral Daily Yesica Medina MD      sertraline  100 mg Oral Daily Yesica Medina MD         Counseling / Coordination of Care: Total floor / unit time spent today 15 minutes   Greater than 50% of total time was spent with the patient and / or family counseling and / or somewhat receptive to supportive listening and teaching positive coping skills to deal with symptom mangement  Patient's Rights, confidentiality and exceptions to confidentiality, use of automated medical record, [facility] staff access to medical record, and consent to treatment reviewed  This note was  not shared with the patient because it might further aggravate her psychiatric condition  This note has been dictated

## 2021-07-14 NOTE — NURSING NOTE
Patient was isolative to her room but out for snack and medication  Rate anxiety 1/4, c/o B/L ankle pain 6 on 10, denies all other s/s at this time  Tylenol was given at 2131  Attended wrap up group  Had snack  Cooperative and compliant with HS medications  Safety checks ongoing

## 2021-07-14 NOTE — NURSING NOTE
Pt is med and meal compliant  Denies any anxiety or depression  Voltaren given at 1045 for ankle pain  Screamed out at 200 saying, "Get away from me I hate you!" she was alone in her room and used coping skills to calm herself down but 30 minutes later was yelling again  This nurse went into her room and she stated "I am very upset right now " When asked if she wanted to talk about it she replied, "No I don't  I am not promoting a talk show  I'm tired of talking  But thank you " No further behavioral issues

## 2021-07-14 NOTE — SOCIAL WORK
This writer called [facility] to obtain an intake date  Per Ligia Wei with [facility], the earliest they can complete an Intake is 7/26 due to staffing issues  This writer met with patient and updated her with this information  This writer explained the next steps will be a CSP meeting once [facility] has opened her case  Patient is in agreement with this

## 2021-07-14 NOTE — PLAN OF CARE
Problem: Nutrition/Hydration-ADULT  Goal: Nutrient/Hydration intake appropriate for improving, restoring or maintaining nutritional needs  Description: Monitor and assess patient's nutrition/hydration status for malnutrition  Collaborate with interdisciplinary team and initiate plan and interventions as ordered  Monitor patient's weight and dietary intake as ordered or per policy  Utilize nutrition screening tool and intervene as necessary  Determine patient's food preferences and provide high-protein, high-caloric foods as appropriate       INTERVENTIONS:  - Monitor oral intake, urinary output, labs, and treatment plans  - Assess nutrition and hydration status and recommend course of action  - Evaluate amount of meals eaten  - Assist patient with eating if necessary   - Allow adequate time for meals  - Recommend/ encourage appropriate diets, oral nutritional supplements, and vitamin/mineral supplements  - Order, calculate, and assess calorie counts as needed  - Recommend, monitor, and adjust tube feedings and TPN/PPN based on assessed needs  - Assess need for intravenous fluids  - Provide specific nutrition/hydration education as appropriate  - Include patient/family/caregiver in decisions related to nutrition  Outcome: Progressing     Problem: Alteration in Thoughts and Perception  Goal: Verbalize thoughts and feelings  Description: Interventions:  - Promote a nonjudgmental and trusting relationship with the patient through active listening and therapeutic communication  - Assess patient's level of functioning, behavior and potential for risk  - Engage patient in 1 on 1 interactions  - Encourage patient to express fears, feelings, frustrations, and discuss symptoms    - [place] patient to reality, help patient recognize reality-based thinking   - Administer medications as ordered and assess for potential side effects  - Provide the patient education related to the signs and symptoms of the illness and desired effects of prescribed medications  Outcome: Progressing  Goal: Agree to be compliant with medication regime, as prescribed and report medication side effects  Description: Interventions:  - Offer appropriate PRN medication and supervise ingestion; conduct AIMS, as needed   Outcome: Progressing  Goal: Attend and participate in unit activities, including therapeutic, recreational, and educational groups  Description: Interventions:  -Encourage Visitation and family involvement in care  Outcome: Progressing     Problem: Ineffective Coping  Goal: Demonstrates healthy coping skills  Outcome: Progressing     Problem: Depression  Goal: Refrain from harming self  Description: Interventions:  - Monitor patient closely, per order   - Supervise medication ingestion, monitor effects and side effects   Outcome: Progressing  Goal: Refrain from self-neglect  Outcome: Progressing     Problem: Anxiety  Goal: Anxiety is at manageable level  Description: Interventions:  - Assess and monitor patient's anxiety level  - Monitor for signs and symptoms (heart palpitations, chest pain, shortness of breath, headaches, nausea, feeling jumpy, restlessness, irritable, apprehensive)  - Collaborate with interdisciplinary team and initiate plan and interventions as ordered    - [place] patient to unit/surroundings  - Explain treatment plan  - Encourage participation in care  - Encourage verbalization of concerns/fears  - Identify coping mechanisms  - Assist in developing anxiety-reducing skills  - Administer/offer alternative therapies  - Limit or eliminate stimulants  Outcome: Progressing

## 2021-07-14 NOTE — NURSING NOTE
Pt resting with eyes closed, appears in no outward distress  Chest rise and fall visible, even and unlabored  No issues noted  Will continue to monitor

## 2021-07-14 NOTE — NURSING NOTE
Ashtyn Spencer has been visible intermittently in the milieu  At times she will take brief naps in bed  Requested and given Robaxin at 1528  for pain in her legs 10/10  Stated that it decreased to "nothing" upon recheck  Ambulates without distress  Ate 100% of her meal  Took pills without an issue  Went out on the deck for fresh air  Phone call with family member  2 brief episodes of yelling out  Upset about being here  Took HS medication  Removed her own Nicotine patch and Lidoderm patch tonight and gave to staff  Continue to monitor  Precautions maintained

## 2021-07-14 NOTE — NURSING NOTE
Pt had another outburst of screaming "I hate you " She requested Haldol and said Zyprexa and Atarax does not work  Pt is sleeping now  No PRN needed at this time

## 2021-07-14 NOTE — PROGRESS NOTES
07/14/21 0901   Team Meeting   Meeting Type Daily Rounds   Team Members Present   Team Members Present Physician;Nurse;; Other (Discipline and Name)   Physician Team Member [address] [facility] Team Member Alon Work Team Member Zeeshan   Other (Discipline and Name) Colten CPS   Patient/Family Present   Patient Present No   Patient's Family Present No     Patient had increased isolation  She had sporadic episodes of yelling  She attended 4 of 5 groups

## 2021-07-15 PROCEDURE — 99232 SBSQ HOSP IP/OBS MODERATE 35: CPT | Performed by: PSYCHIATRY & NEUROLOGY

## 2021-07-15 RX ADMIN — GABAPENTIN 600 MG: 300 CAPSULE ORAL at 17:11

## 2021-07-15 RX ADMIN — LORAZEPAM 0.5 MG: 0.5 TABLET ORAL at 12:24

## 2021-07-15 RX ADMIN — LIDOCAINE 1 PATCH: 50 PATCH CUTANEOUS at 09:08

## 2021-07-15 RX ADMIN — PANTOPRAZOLE SODIUM 40 MG: 40 TABLET, DELAYED RELEASE ORAL at 06:07

## 2021-07-15 RX ADMIN — FENOFIBRATE 145 MG: 145 TABLET ORAL at 09:06

## 2021-07-15 RX ADMIN — PSYLLIUM HUSK 1 PACKET: 3.4 POWDER ORAL at 09:08

## 2021-07-15 RX ADMIN — HALOPERIDOL 1 MG: 1 TABLET ORAL at 09:06

## 2021-07-15 RX ADMIN — ACETAMINOPHEN 975 MG: 325 TABLET ORAL at 21:34

## 2021-07-15 RX ADMIN — LORAZEPAM 0.5 MG: 0.5 TABLET ORAL at 09:06

## 2021-07-15 RX ADMIN — OLANZAPINE 10 MG: 10 TABLET, FILM COATED ORAL at 21:12

## 2021-07-15 RX ADMIN — HALOPERIDOL 1 MG: 1 TABLET ORAL at 21:12

## 2021-07-15 RX ADMIN — SERTRALINE HYDROCHLORIDE 100 MG: 100 TABLET ORAL at 09:06

## 2021-07-15 RX ADMIN — HALOPERIDOL 1 MG: 1 TABLET ORAL at 17:11

## 2021-07-15 RX ADMIN — NICOTINE 1 PATCH: 21 PATCH, EXTENDED RELEASE TRANSDERMAL at 09:08

## 2021-07-15 RX ADMIN — BENZTROPINE MESYLATE 1 MG: 1 TABLET ORAL at 17:11

## 2021-07-15 RX ADMIN — HALOPERIDOL 1 MG: 1 TABLET ORAL at 12:24

## 2021-07-15 RX ADMIN — DIPHENHYDRAMINE HCL 25 MG: 25 TABLET ORAL at 21:12

## 2021-07-15 RX ADMIN — BENZTROPINE MESYLATE 1 MG: 1 TABLET ORAL at 09:07

## 2021-07-15 RX ADMIN — OLANZAPINE 15 MG: 5 TABLET, FILM COATED ORAL at 09:06

## 2021-07-15 RX ADMIN — GABAPENTIN 600 MG: 300 CAPSULE ORAL at 21:12

## 2021-07-15 RX ADMIN — GABAPENTIN 600 MG: 300 CAPSULE ORAL at 12:24

## 2021-07-15 RX ADMIN — PHENYTOIN 1 MG: 125 SUSPENSION ORAL at 21:12

## 2021-07-15 RX ADMIN — LORAZEPAM 0.5 MG: 0.5 TABLET ORAL at 17:11

## 2021-07-15 RX ADMIN — GABAPENTIN 600 MG: 300 CAPSULE ORAL at 09:07

## 2021-07-15 RX ADMIN — LORAZEPAM 0.5 MG: 0.5 TABLET ORAL at 21:12

## 2021-07-15 NOTE — PROGRESS NOTES
Psychiatry Progress Note [facility] 49 y o  female MRN: [id number]  Unit/Bed#: AMBER LOU [facility] 819-89 Encounter: [id number]  Code Status: Level 1 - Full Code    PCP: Guilherme Walker DO    Date of Admission:  4/7/2021 1435   Date of Service:  07/15/21    Patient Active Problem List   Diagnosis    Schizoaffective disorder, bipolar type ([facility] 75 )    Uncomplicated alcohol dependence ([facility] )    Suicidal behavior    LYNN (generalized anxiety disorder)    Slow transit constipation    Deficiency of vitamin B    Degenerative disc disease, lumbar    Post-traumatic stress disorder, chronic    Lower extremity weakness    Generalized abdominal pain    Non-intractable vomiting    Medical clearance for psychiatric admission    Carbuncle    Alcohol dependence with unspecified alcohol-induced disorder ([place] [address] )    Mild intermittent asthma without complication    Tobacco abuse    Ear problem, bilateral    Gastroesophageal reflux disease    Right foot pain    Ear problem, right    Injury of head    Acute sinusitis    Chlamydial cervicitis     Review of systems:   Needed Robaxin for back pain and  Voltaren gel for ankle pain otherwise unremarkable  Diagnosis:            Schizoaffective bipolar, alcohol abuse in early remission in controlled environment  Assessment   Overall Status:       Waiting for CSP and  eventual discharge to group home  after picked up by an act team and was screaming once yesterday before noon time   Certification Statement: The patient will continue to require additional inpatient hospital stay due to ongoing mood swings paranoia   Acceptance by patient:  accepting    Hopefulness in recovery:  Living in a group home   Understanding of medications: has good understanding   Involved in reintegration process:  Adjusting to the unit   Trusting in relationship with psychiatrist:  trusting   Justification for dual anti-psychotics: due to lack of response to single antipsychotics   Side effects from treatment: none    Medication changes    none today  Medication Education; Risks and S/E and precautions of all meds given to patient and she did verbalize an understanding   Non-pharmacological treatments   Continue with individual, group, milieu and occupational therapy using recovery principles and psycho-education about accepting illness and the need for treatment  Safety   Safety and communication plan established to target dynamic risk factors discussed above  Discharge Plan     most likely to a group home with the act team again     Interval Progress       Was screaming once before noontime yesterday but claims to hear no more voices  She is still unable to explain why she was screaming  Did continues to have some delusions about being hypnotized by staff and people stalking her and talking to her through the vents and TV sending messages    Waiting for the act team to pick her up before CSP meeting his schedule so she can be discharged to the [facility] in the [place] as they have already accepted her     Sleep:                                good   appetite:                             good   compliance with medications :          good   Side  Effects:                                      None reported   significant events:                               Improving except for occasional school   group attendance:                                Attending most of the groups 3/5    Mental Status Exam  Appearance: age appropriate, casually dressed, looks older than stated age, overweight   In good spirits well groomed  Behavior: normal, pleasant, cooperative, appears anxious, mildly anxious,  more friendly today and not confrontational    Still anticipating moving to the [facility]   Speech: fluent, clear, coherent, increased rate, pressured, hypertalkative,   Circumstantial pressured    Mood: depressed, anxious, euphoric  Affect: labile, reactive, slightly brighter    Thought Process: normal abstract reasoning, less prominent, tends to be pressured and circumstantial and preoccupied perseverating on discharge    Thought Content: some paranoia, ideas of reference, but does appear as if paranoid  No current suicidal homicidal thoughts intent or plans verbalized  No phobias obsessions compulsions or distorted body perceptions elicited  Also believes TV is sending her messages from movie stars checking her if she is good or bad and threatening to get the feds to go after her      Continues to believe people are hypnotizing her and believes that people are doing things to cramp up her legs   Perceptual Disturbances: no auditory hallucinations, no visual hallucinations, denies auditory hallucinations when asked, does not appear responding to internal stimuli, no voices reported today    No voices today  Hx Risk Factors: chronic mood disorder, chronic psychotic symptoms, alcohol use, limted insight  Sensorium:        Oriented to 3 spheres and to situation  Cognition: recent and remote memory grossly intact  Consciousness: alert and awake  Attention: attention span and concentration are age appropriate  Intellect: appears to be of average intelligence  Insight: improving  Judgement: improving  Motor Activity: no abnormal movements     Vitals  Temp:  [97 7 °F (36 5 °C)-98 2 °F (36 8 °C)] 97 7 °F (36 5 °C)  HR:  [85-86] 86  Resp:  [16-18] 16  BP: (101-132)/(66-81) 121/69  No intake or output data in the 24 hours ending 07/15/21 0524    Lab Results: No New Labs Available For Today     Current Facility-Administered Medications   Medication Dose Route Frequency Provider Last Rate    acetaminophen  650 mg Oral Q6H PRN Nerissa Sanchez MD      acetaminophen  650 mg Oral Q4H PRN Nerissa Sanchez MD      acetaminophen  975 mg Oral Q6H PRN Nerissa Sanchez MD      al mag oxide-diphenhydramine-lidocaine viscous  10 mL Swish & Swallow Q4H PRN Nerissa Sanchez MD      albuterol 2 puff Inhalation Q6H PRN Edi Marques MD      aluminum-magnesium hydroxide-simethicone  15 mL Oral Q4H PRN Edi Marques MD      benztropine  1 mg Intramuscular Q4H PRN Max 6/day Edi Marques MD      benztropine  1 mg Oral Q4H PRN Max 6/day Edi Marques MD      benztropine  1 mg Oral BID Edi Marques MD      calcium carbonate  500 mg Oral TID PRN Edi Marques MD      Diclofenac Sodium  2 g Topical 4x Daily PRN Edi Marques MD      diphenhydrAMINE  25 mg Oral HS Edi Marques MD      fenofibrate  145 mg Oral Daily Edi Marques MD      gabapentin  600 mg Oral 4x Daily Edi Marques MD      haloperidol  1 mg Oral 4x Daily Edi Marques MD      haloperidol  5 mg Oral Q6H PRN Edi Marques MD      hydrOXYzine HCL  25 mg Oral Q6H PRN Max 4/day Edi Marques MD      hydrOXYzine HCL  50 mg Oral Q6H PRN Max 4/day Edi Marques MD      lidocaine  1 patch Topical Daily Edi Marques MD      LORazepam  1 mg Intramuscular Q6H PRN Edi Marques MD      LORazepam  0 5 mg Oral 4x Daily Edi Marques MD      magnesium hydroxide  30 mL Oral Daily PRN Edi Marques MD      methocarbamol  500 mg Oral Q8H PRN Edi Marques MD      nicotine  1 patch Transdermal Daily Edi Marques MD      nicotine polacrilex  4 mg Oral Q2H PRN Edi Marques MD      OLANZapine  2 5 mg Oral Q8H PRN Edi Marques MD      OLANZapine  5 mg Oral Q3H PRN Edi Marques MD      OLANZapine  7 5 mg Oral Q3H PRN Edi Marques MD      OLANZapine  10 mg Intramuscular Q3H PRN Edi Marques MD      OLANZapine  5 mg Intramuscular Q3H PRN Edi Marques MD      OLANZapine  10 mg Oral HS Edi Marques MD      OLANZapine  15 mg Oral Daily Edi Marques MD      paliperidone palmitate ER  234 mg Intramuscular Q30 Days Edi Marques MD      pantoprazole  40 mg Oral Early Morning Edi Marques MD      Pramox-PE-Glycerin-Petrolatum  1 application Rectal 4x Daily PRN Edi Marques MD      prazosin  1 mg Oral HS Edi Marques MD      psyllium  1 packet Oral Daily Asiya Selby James Armstrong MD      sertraline  100 mg Oral Daily Giovani Leavitt MD         Counseling / Coordination of Care: Total floor / unit time spent today 15 minutes  Greater than 50% of total time was spent with the patient and / or family counseling and / or somewhat receptive to supportive listening and teaching positive coping skills to deal with symptom mangement  Patient's Rights, confidentiality and exceptions to confidentiality, use of automated medical record, [address] [place] [address] staff access to medical record, and consent to treatment reviewed  This note was  not shared with the patient because it might further aggravate her psychiatric condition  This note has been dictated

## 2021-07-15 NOTE — PROGRESS NOTES
07/15/21 0903   Team Meeting   Meeting Type Daily Rounds   Team Members Present   Team Members Present Physician;Nurse   Physician Team Member [address] [facility] Team Member Flaco   Social Work Team Member Yahaira   Other (Discipline and Name) Colten CPS   Patient/Family Present   Patient Present No   Patient's Family Present No     Patient had two episodes of yelling  She will be picked up by [id number] [address] but not before 7/26

## 2021-07-15 NOTE — NURSING NOTE
Devon Bustamante has been visible out in the milieu  Patient had a irritable edge all shift that was more apparent after making phone calls  Patient had multiple episodes of yelling and was redirectable for short periods of time than would start yelling again  Patient requested haldol but this writer explained to her that she just got some with her meds and she should try to use her coping skills patient seemed receptive but than c/o foot/ankle pain  PRN tylenol administered at 2134 for 8/10 pain, effective  She is medication and meal compliant   Attended therapeutic activity and wrap up group  Consumed 100% of dinner  Continue to monitor

## 2021-07-15 NOTE — NURSING NOTE
Pt observed in bed thus far  Appears to be resting comfortably with eyes closed  No distress noted  Will frequently monitor for safety and support

## 2021-07-15 NOTE — NURSING NOTE
Patient is calm and cooperative  She has not had any outburst today  She has been conducting personal hygiene and attending groups appropriately

## 2021-07-15 NOTE — PLAN OF CARE
Problem: Alteration in Thoughts and Perception  Goal: Verbalize thoughts and feelings  Description: Interventions:  - Promote a nonjudgmental and trusting relationship with the patient through active listening and therapeutic communication  - Assess patient's level of functioning, behavior and potential for risk  - Engage patient in 1 on 1 interactions  - Encourage patient to express fears, feelings, frustrations, and discuss symptoms    - [place] patient to reality, help patient recognize reality-based thinking   - Administer medications as ordered and assess for potential side effects  - Provide the patient education related to the signs and symptoms of the illness and desired effects of prescribed medications  Outcome: Progressing  Goal: Agree to be compliant with medication regime, as prescribed and report medication side effects  Description: Interventions:  - Offer appropriate PRN medication and supervise ingestion; conduct AIMS, as needed   Outcome: Progressing  Goal: Attend and participate in unit activities, including therapeutic, recreational, and educational groups  Description: Interventions:  -Encourage Visitation and family involvement in care  Outcome: Progressing

## 2021-07-16 PROCEDURE — 99232 SBSQ HOSP IP/OBS MODERATE 35: CPT | Performed by: PSYCHIATRY & NEUROLOGY

## 2021-07-16 RX ADMIN — HALOPERIDOL 1 MG: 1 TABLET ORAL at 08:46

## 2021-07-16 RX ADMIN — GABAPENTIN 600 MG: 300 CAPSULE ORAL at 21:10

## 2021-07-16 RX ADMIN — GABAPENTIN 600 MG: 300 CAPSULE ORAL at 08:45

## 2021-07-16 RX ADMIN — HALOPERIDOL 1 MG: 1 TABLET ORAL at 21:10

## 2021-07-16 RX ADMIN — LIDOCAINE 1 PATCH: 50 PATCH CUTANEOUS at 08:44

## 2021-07-16 RX ADMIN — BENZTROPINE MESYLATE 1 MG: 1 TABLET ORAL at 17:04

## 2021-07-16 RX ADMIN — HALOPERIDOL 1 MG: 1 TABLET ORAL at 11:35

## 2021-07-16 RX ADMIN — LORAZEPAM 0.5 MG: 0.5 TABLET ORAL at 12:34

## 2021-07-16 RX ADMIN — OLANZAPINE 10 MG: 10 TABLET, FILM COATED ORAL at 21:11

## 2021-07-16 RX ADMIN — DICLOFENAC SODIUM 2 G: 10 GEL TOPICAL at 09:44

## 2021-07-16 RX ADMIN — LORAZEPAM 0.5 MG: 0.5 TABLET ORAL at 21:10

## 2021-07-16 RX ADMIN — PANTOPRAZOLE SODIUM 40 MG: 40 TABLET, DELAYED RELEASE ORAL at 06:15

## 2021-07-16 RX ADMIN — HALOPERIDOL 1 MG: 1 TABLET ORAL at 17:04

## 2021-07-16 RX ADMIN — OLANZAPINE 15 MG: 5 TABLET, FILM COATED ORAL at 08:46

## 2021-07-16 RX ADMIN — DIPHENHYDRAMINE HCL 25 MG: 25 TABLET ORAL at 21:10

## 2021-07-16 RX ADMIN — SERTRALINE HYDROCHLORIDE 100 MG: 100 TABLET ORAL at 08:46

## 2021-07-16 RX ADMIN — LORAZEPAM 0.5 MG: 0.5 TABLET ORAL at 08:45

## 2021-07-16 RX ADMIN — LORAZEPAM 0.5 MG: 0.5 TABLET ORAL at 17:04

## 2021-07-16 RX ADMIN — OLANZAPINE 5 MG: 5 TABLET, ORALLY DISINTEGRATING ORAL at 13:00

## 2021-07-16 RX ADMIN — GABAPENTIN 600 MG: 300 CAPSULE ORAL at 12:00

## 2021-07-16 RX ADMIN — DICLOFENAC SODIUM 2 G: 10 GEL TOPICAL at 19:05

## 2021-07-16 RX ADMIN — PHENYTOIN 1 MG: 125 SUSPENSION ORAL at 21:10

## 2021-07-16 RX ADMIN — GABAPENTIN 600 MG: 300 CAPSULE ORAL at 17:04

## 2021-07-16 RX ADMIN — NICOTINE 1 PATCH: 21 PATCH, EXTENDED RELEASE TRANSDERMAL at 08:45

## 2021-07-16 RX ADMIN — FENOFIBRATE 145 MG: 145 TABLET ORAL at 08:45

## 2021-07-16 RX ADMIN — PSYLLIUM HUSK 1 PACKET: 3.4 POWDER ORAL at 08:45

## 2021-07-16 RX ADMIN — BENZTROPINE MESYLATE 1 MG: 1 TABLET ORAL at 08:46

## 2021-07-16 NOTE — PROGRESS NOTES
07/16/21 0853   Team Meeting   Meeting Type Daily Rounds   Team Members Present   Team Members Present Physician;Nurse;   Physician Team Member [address] [facility],[address] Team Member Towanda Lennox   Social Work Team Member Yahaira   Patient/Family Present   Patient Present No   Patient's Family Present No     Patient is irritable, she yells at times   She is compliant with medications

## 2021-07-16 NOTE — PROGRESS NOTES
Psychiatry Progress Note [facility] 49 y o  female MRN: [id number]  Unit/Bed#: [facility]BER L[facility] 474-23 Encounter: [id number]  Code Status: Level 1 - Full Code    PCP: Susan Stanley DO    Date of Admission:  4/7/2021 1435   Date of Service:  07/16/21    Patient Active Problem List   Diagnosis    [facility]oaffective disorder, [facility] type ([facility] )    Uncomplicated alcohol dependence ([facility] )    Suicidal behavior    LYNN (generalized anxiety disorder)    Slow transit constipation    Deficiency of vitamin B    Degenerative disc disease, lumbar    Post-traumatic stress disorder, chronic    Lower extremity weakness    Generalized abdominal pain    Non-intractable vomiting    Medical clearance for psychiatric admission    Carbuncle    Alcohol dependence with unspecified alcohol-induced disorder ([facility] )    Mild intermittent asthma without complication    Tobacco abuse    Ear problem, bilateral    Gastroesophageal reflux disease    Right foot pain    Ear problem, right    Injury of head    Acute sinusitis    Chlamydial cervicitis     Review of systems:    Needed Tylenol for foot and ankle pain was unremarkable  Diagnosis:            Schizoaffective bipolar, alcohol use disorder and in early remission in controlled environment  Assessment   Overall Status:       Waiting for CSP and  eventual discharge to group home  after picked up by an act team and was screaming once yesterday before noon time   Certification Statement: The patient will continue to require additional inpatient hospital stay due to ongoing mood swings paranoia   Acceptance by patient:  accepting    Hopefulness in recovery:  Living in a group home   Understanding of medications: has good understanding   Involved in reintegration process:  Adjusting to the unit   Trusting in relationship with psychiatrist:  trusting   Justification for dual anti-psychotics: due to lack of response to single antipsychotics   Side effects from treatment: none    Medication changes    none today  Medication Education; Risks and S/E and precautions of all meds given to patient and she did verbalize an understanding   Non-pharmacological treatments   Continue with individual, group, milieu and occupational therapy using recovery principles and psycho-education about accepting illness and the need for treatment  Safety   Safety and communication plan established to target dynamic risk factors discussed above  Discharge Plan     most likely to a group home with the act team again     Interval Progress       Few episodes of screaming reported yesterday but redirectable does asking for p r n  but accepted the fact that she already got Haldol on a regular basis and she did seem to accepted  Attending groups cooperative friendly pleasant but still with some delusions about being hypnotized by staff and people stalking her and talking throgh the vents and  receiving messages from TV which are all fixated delusions  And able to explain why she was screaming but reports that it was not because voices but possibly order pain on her feet    Anticipating to be discharged hopefully by the beginning of next month once picked up by an act team as she was accepted by the [facility] in [place]      Sleep:                                 Good   appetite:                              good   compliance with medications :          good   Side  Effects:                                     None report   significant events:                              Improved but few episodes of yelling and still somewhat delusional off and on   group attendance:                              Attending most of the groups    Mental Status Exam  Appearance: age appropriate, casually dressed, looks older than stated age, overweight   In good spirits well groomed found walking the hallways  Behavior: normal, pleasant, cooperative, appears anxious, mildly anxious,  more friendly today and not confrontational     Anticipating placement at the group home soon   Speech: fluent, clear, coherent, increased rate, pressured, hypertalkative,   Circumstantial pressured    Mood: depressed, anxious, euphoric  Affect: labile, reactive, slightly brighter    Thought Process: normal abstract reasoning, less prominent, tends to be pressured and circumstantial and preoccupied perseverating on discharge    Thought Content: some paranoia, ideas of reference, but does appear as if paranoid  No current suicidal homicidal thoughts intent or plans verbalized  No phobias obsessions compulsions or distorted body perceptions elicited  Also believes TV is sending her messages from [email or web address] checking her if she is good or bad and threatening to get the feds to go after her      Continued to express believe that people are doing hypnosis on her   Perceptual Disturbances: no auditory hallucinations, no visual hallucinations, denies auditory hallucinations when asked, does not appear responding to internal stimuli, no voices reported today   No voices reported today  Hx Risk Factors: chronic mood disorder, chronic psychotic symptoms, alcohol use, limted insight  Sensorium:         Oriented to 3 spheres and to situation  Cognition: recent and remote memory grossly intact  Consciousness: alert and awake  Attention: attention span and concentration are age appropriate  Intellect: appears to be of average intelligence  Insight: improving  Judgement: improving  Motor Activity: no abnormal movements     Vitals  Temp:  [97 4 °F (36 3 °C)] 97 4 °F (36 3 °C)  HR:  [82-98] 82  Resp:  [16-18] 16  BP: (107-113)/(58-75) 113/58  No intake or output data in the 24 hours ending 07/16/21 0519    Lab Results: No New Labs Available For Today     Current Facility-Administered Medications   Medication Dose Route Frequency Provider Last Rate    acetaminophen  650 mg Oral Q6H PRN Edi Marques MD      acetaminophen  650 mg Oral Q4H PRN Nerissa Sanchez MD      acetaminophen  975 mg Oral Q6H PRN Nerissa Sanchez MD      al mag oxide-diphenhydramine-lidocaine viscous  10 mL Swish & Swallow Q4H PRN Nerissa Sanchez MD      albuterol  2 puff Inhalation Q6H PRN Nerissa Sanchez MD      aluminum-magnesium hydroxide-simethicone  15 mL Oral Q4H PRN Nerissa Sanchez MD      benztropine  1 mg Intramuscular Q4H PRN Max 6/day Nerissa Sanchez MD      benztropine  1 mg Oral Q4H PRN Max 6/day Nerissa Sanchez MD      benztropine  1 mg Oral BID Nerissa Sanchez MD      calcium carbonate  500 mg Oral TID PRN Nerissa Sanchez MD      Diclofenac Sodium  2 g Topical 4x Daily PRN Nerissa Sanchez MD      diphenhydrAMINE  25 mg Oral HS Nerissa Sanchez MD      fenofibrate  145 mg Oral Daily Nerissa Sanchez MD      gabapentin  600 mg Oral 4x Daily Nerissa Sanchez MD      haloperidol  1 mg Oral 4x Daily Nerissa Sanchez MD      haloperidol  5 mg Oral Q6H PRN Nerissa Sanchez MD      hydrOXYzine HCL  25 mg Oral Q6H PRN Max 4/day Nerissa Sanchez MD      hydrOXYzine HCL  50 mg Oral Q6H PRN Max 4/day Nerissa Sanchez MD      lidocaine  1 patch Topical Daily Nerissa Sanchez MD      LORazepam  1 mg Intramuscular Q6H PRN Nerissa Sanchez MD      LORazepam  0 5 mg Oral 4x Daily Nerissa Sanchez MD      magnesium hydroxide  30 mL Oral Daily PRN Nerissa Sanchez MD      methocarbamol  500 mg Oral Q8H PRN Nerissa Sanchez MD      nicotine  1 patch Transdermal Daily Nerissa Sanchez MD      nicotine polacrilex  4 mg Oral Q2H PRN Nerissa Sanchez MD      OLANZapine  2 5 mg Oral Q8H PRN Nerissa Sanchez MD      OLANZapine  5 mg Oral Q3H PRN Nerissa Sanchez MD      OLANZapine  7 5 mg Oral Q3H PRN Nerissa Sanchez MD      OLANZapine  10 mg Intramuscular Q3H PRN Nerissa Sanchez MD      OLANZapine  5 mg Intramuscular Q3H PRN Nerissa Sanchez MD      OLANZapine  10 mg Oral HS Nerissa Sanchez MD      OLANZapine  15 mg Oral Daily Nerissa Sanchez MD      paliperidone palmitate ER  234 mg Intramuscular Q30 Days Nerissa Sanchez MD      pantoprazole  40 mg Oral Early Morning Nerissa Sanchez MD      Pramox-PE-Glycerin-Petrolatum  1 application Rectal 4x Daily PRN Nerissa Sanchez MD      prazosin  1 mg Oral HS Nerissa Sanchez MD      psyllium  1 packet Oral Daily Nerissa Sanchez MD      sertraline  100 mg Oral Daily Nerissa Sanchez MD         Counseling / Coordination of Care: Total floor / unit time spent today 15 minutes  Greater than 50% of total time was spent with the patient and / or family counseling and / or somewhat receptive to supportive listening and teaching positive coping skills to deal with symptom mangement  Patient's Rights, confidentiality and exceptions to confidentiality, use of automated medical record, [facility] staff access to medical record, and consent to treatment reviewed  This note was  not shared with the patient because it might further aggravate her psychiatric condition  This note has been dictated

## 2021-07-16 NOTE — PLAN OF CARE
Problem: Alteration in Thoughts and Perception  Goal: Verbalize thoughts and feelings  Description: Interventions:  - Promote a nonjudgmental and trusting relationship with the patient through active listening and therapeutic communication  - Assess patient's level of functioning, behavior and potential for risk  - Engage patient in 1 on 1 interactions  - Encourage patient to express fears, feelings, frustrations, and discuss symptoms    - [place] patient to reality, help patient recognize reality-based thinking   - Administer medications as ordered and assess for potential side effects  - Provide the patient education related to the signs and symptoms of the illness and desired effects of prescribed medications  Outcome: Progressing  Goal: Refrain from acting on delusional thinking/internal stimuli  Description: Interventions:  - Monitor patient closely, per order   - Utilize least restrictive measures   - Set reasonable limits, give positive feedback for acceptable   - Administer medications as ordered and monitor of potential side effects  Outcome: Not Progressing  Goal: Agree to be compliant with medication regime, as prescribed and report medication side effects  Description: Interventions:  - Offer appropriate PRN medication and supervise ingestion; conduct AIMS, as needed   Outcome: Progressing

## 2021-07-16 NOTE — NURSING NOTE
Pt is med and meal compliant  She is visible intermittently on the unit and social with select peers  Voltaren given at 0944 and was effective  Pt came to the nurses station and said "Hi I've been tasting and smelling things that are not there can you tell Dr Belkis Tripp so that I can have a med change  Haldol works if he can increase that " She then stated that she thinks the doctor will tell her to use her coping skills  Doctor was notified and PRN Zyprexa 5mg given  She denies any anxiety and depression  No behavioral issues

## 2021-07-16 NOTE — NURSING NOTE
Devon Bustamante has been visible out in the milieu  Patient had a much better shift, No yelling or behaviors noted  PRN voltaren gel administered at 1905  She is medication and meal compliant   Attended therapeutic activity and wrap up group  Consumed 100% of dinner  Continue to monitor

## 2021-07-17 LAB
ATRIAL RATE: 70 BPM
P AXIS: 56 DEGREES
PR INTERVAL: 162 MS
QRS AXIS: 17 DEGREES
QRSD INTERVAL: 78 MS
QT INTERVAL: 440 MS
QTC INTERVAL: 475 MS
T WAVE AXIS: 42 DEGREES
VENTRICULAR RATE: 70 BPM

## 2021-07-17 PROCEDURE — 99232 SBSQ HOSP IP/OBS MODERATE 35: CPT | Performed by: STUDENT IN AN ORGANIZED HEALTH CARE EDUCATION/TRAINING PROGRAM

## 2021-07-17 PROCEDURE — 93010 ELECTROCARDIOGRAM REPORT: CPT | Performed by: INTERNAL MEDICINE

## 2021-07-17 RX ADMIN — GABAPENTIN 600 MG: 300 CAPSULE ORAL at 08:40

## 2021-07-17 RX ADMIN — LORAZEPAM 0.5 MG: 0.5 TABLET ORAL at 17:17

## 2021-07-17 RX ADMIN — LORAZEPAM 0.5 MG: 0.5 TABLET ORAL at 08:41

## 2021-07-17 RX ADMIN — PANTOPRAZOLE SODIUM 40 MG: 40 TABLET, DELAYED RELEASE ORAL at 05:56

## 2021-07-17 RX ADMIN — NICOTINE 1 PATCH: 21 PATCH, EXTENDED RELEASE TRANSDERMAL at 08:41

## 2021-07-17 RX ADMIN — FENOFIBRATE 145 MG: 145 TABLET ORAL at 08:40

## 2021-07-17 RX ADMIN — OLANZAPINE 15 MG: 5 TABLET, FILM COATED ORAL at 08:40

## 2021-07-17 RX ADMIN — PSYLLIUM HUSK 1 PACKET: 3.4 POWDER ORAL at 08:40

## 2021-07-17 RX ADMIN — LORAZEPAM 0.5 MG: 0.5 TABLET ORAL at 21:20

## 2021-07-17 RX ADMIN — GABAPENTIN 600 MG: 300 CAPSULE ORAL at 12:23

## 2021-07-17 RX ADMIN — PHENYTOIN 1 MG: 125 SUSPENSION ORAL at 21:21

## 2021-07-17 RX ADMIN — HALOPERIDOL 1 MG: 1 TABLET ORAL at 12:23

## 2021-07-17 RX ADMIN — OLANZAPINE 10 MG: 10 TABLET, FILM COATED ORAL at 21:21

## 2021-07-17 RX ADMIN — BENZTROPINE MESYLATE 1 MG: 1 TABLET ORAL at 17:17

## 2021-07-17 RX ADMIN — DIPHENHYDRAMINE HCL 25 MG: 25 TABLET ORAL at 21:20

## 2021-07-17 RX ADMIN — GABAPENTIN 600 MG: 300 CAPSULE ORAL at 21:20

## 2021-07-17 RX ADMIN — HALOPERIDOL 1 MG: 1 TABLET ORAL at 17:17

## 2021-07-17 RX ADMIN — LORAZEPAM 0.5 MG: 0.5 TABLET ORAL at 12:23

## 2021-07-17 RX ADMIN — LIDOCAINE 1 PATCH: 50 PATCH CUTANEOUS at 08:40

## 2021-07-17 RX ADMIN — BENZTROPINE MESYLATE 1 MG: 1 TABLET ORAL at 08:40

## 2021-07-17 RX ADMIN — SERTRALINE HYDROCHLORIDE 100 MG: 100 TABLET ORAL at 08:41

## 2021-07-17 RX ADMIN — GABAPENTIN 600 MG: 300 CAPSULE ORAL at 17:17

## 2021-07-17 RX ADMIN — HALOPERIDOL 1 MG: 1 TABLET ORAL at 21:20

## 2021-07-17 RX ADMIN — PALIPERIDONE PALMITATE 234 MG: 234 INJECTION INTRAMUSCULAR at 11:51

## 2021-07-17 RX ADMIN — HALOPERIDOL 1 MG: 1 TABLET ORAL at 08:40

## 2021-07-17 NOTE — PROGRESS NOTES
Progress Note - Behavioral Health   Aleda Cheadle 49 y o  female MRN: [id number]  Unit/Bed#: [facility] 626-91 Encounter: [id number]       Patient was visited on unit for continuing care; chart reviewed and discussed with the nursing staff  On approach, the patient was calm, pleasant and cooperative, preoccupied with gustatory hallucinations and taking more Zyprexa  She reported having "tactil hallucinations" (referring to her gustatory hallucinations) as keeps "tasting every food I hate in a very strong way", usually while not eating anything, and improves when eats a snack or food  Otherwise, reported feeling better, and denied endorsed good appetite, and energy level  No problem initiating and maintaining sleep  Denied A/VH currently  Denied SI/HI, intent or plan upon direct inquiry at this time  Patient continues to be pleasant, and interactive with staff and peers  No reports of aggression or self-injurious behavior on unit  Received Zyprexa 5 mg PO PRN at 1300 yesterday for agitation and frustration due to gustatory hallucinations, and reported feeling better as per pt  Patient accepted all offered medications including monthly dose of Invega Sustenna 234 mg IM and reported feeling better  No adverse effects of medications noted or reported      Current Mental Status Evaluation:  Appearance and attitude: appeared as stated age, casually dressed with good hygiene  Eye contact: good  Motor Function: within normal limits, No PMA/PMR  Gait/station: Not observed  Speech: normal for rate, rhythm, volume, latency, amount  Language: No overt abnormality  Mood/affect: euthymic; slightly anxious / Affect was euthymic, reactive, in full range, normal intensity and mood congruent  Thought Processes: preoccupied with medications and somatic complaints  Thought content: denied suicidal ideations or homicidal ideations, paranoid ideation, preoccupied with somatic complaints and meds  Associations: concrete associations  Perceptual disturbances: no auditory hallucinations, no visual hallucinations, reported gustatory hallucination  Orientation: oriented to place and person  Cognitive Function: intact  Memory: not cooperative with formal MMSE  Intellect: unable to assess  Fund of knowledge: aware of current events and vocabulary average  Impulse control: good  Insight/judgment: limited/limited    Pain: denied  Pain scale: 0    Vital signs in last 24 hours:    Temp:  [97 4 °F (36 3 °C)-97 8 °F (36 6 °C)] 97 8 °F (36 6 °C)  HR:  [] 95  Resp:  [17-19] 17  BP: (109-120)/(72-73) 109/72    Laboratory results: I have personally reviewed all pertinent laboratory/tests results    Most Recent Labs:   Lab Results   Component Value Date    WBC 6 80 04/09/2021    RBC 4 32 04/09/2021    HGB 13 0 04/09/2021    HCT 39 5 04/09/2021     04/09/2021    RDW 14 3 04/09/2021    NEUTROABS 7 10 (H) 03/16/2021    SODIUM 139 05/03/2021    K 4 4 05/03/2021     05/03/2021    CO2 30 05/03/2021    BUN 14 05/03/2021    CREATININE 0 61 05/03/2021    GLUC 94 05/03/2021    CALCIUM 9 8 05/03/2021    AST 25 05/03/2021    ALT 18 05/03/2021    ALKPHOS 56 05/03/2021    TP 7 2 05/03/2021    ALB 4 2 05/03/2021    TBILI 0 25 05/03/2021    CHOLESTEROL 300 (H) 03/31/2021    HDL 57 03/31/2021    TRIG 658 (H) 03/31/2021    [address]  03/31/2021      Comment:      Calculated LDL invalid, triglycerides >400 mg/dl    NONHDLC 147 08/23/2020    AMMONIA 28 10/27/2017    RFG2MCJVJABG 3 810 04/09/2021    FREET4 0 89 03/24/2016    PREGUR negative 03/16/2021    PREGSERUM Negative 10/21/2019    HCG <2 00 04/10/2014    RPR Non-Reactive 03/31/2021    HGBA1C 5 0 08/06/2019    EAG 97 08/06/2019       Progress Toward Goals: limited improvement    Assessment:  Principal Problem:    Schizoaffective disorder, bipolar type ([facility] [address] )  Active Problems:    Post-traumatic stress disorder, chronic    Medical clearance for psychiatric admission    Mild intermittent asthma without complication    Ear problem, bilateral    Gastroesophageal reflux disease    Right foot pain    Ear problem, right        Plan:  - Continue medication titration and treatment plan; adjust medication to optimize treatment response and as clinically indicated       Scheduled medications:  Current Facility-Administered Medications   Medication Dose Route Frequency Provider Last Rate    acetaminophen  650 mg Oral Q6H PRN Veronica Cartwright MD      acetaminophen  650 mg Oral Q4H PRN Veronica Cartwright MD      acetaminophen  975 mg Oral Q6H PRN Veronica Cartwright MD      al mag oxide-diphenhydramine-lidocaine viscous  10 mL Swish & Swallow Q4H PRN Veronica Cartwright MD      albuterol  2 puff Inhalation Q6H PRN Veronica Cartwright MD      aluminum-magnesium hydroxide-simethicone  15 mL Oral Q4H PRN Veronica Cartwright MD      benztropine  1 mg Intramuscular Q4H PRN Max 6/day Veronica Cartwright MD      benztropine  1 mg Oral Q4H PRN Max 6/day Veronica Cartwright MD      benztropine  1 mg Oral BID Veronica Cartwright MD      calcium carbonate  500 mg Oral TID PRN Veronica Cartwright MD      Diclofenac Sodium  2 g Topical 4x Daily PRN Veronica Cartwright MD      diphenhydrAMINE  25 mg Oral HS Veronica Cartwright MD      fenofibrate  145 mg Oral Daily Veronica Cartwright MD      gabapentin  600 mg Oral 4x Daily Veronica Cartwright MD      haloperidol  1 mg Oral 4x Daily Veronica Cartwright MD      haloperidol  5 mg Oral Q6H PRN Veronica Cartwright MD      hydrOXYzine HCL  25 mg Oral Q6H PRN Max 4/day Veronica Cartwright MD      hydrOXYzine HCL  50 mg Oral Q6H PRN Max 4/day Veronica Cartwright MD      lidocaine  1 patch Topical Daily Veronica Cartwright MD      LORazepam  1 mg Intramuscular Q6H PRN Veronica Cartwright MD      LORazepam  0 5 mg Oral 4x Daily Veronica Cartwright MD      magnesium hydroxide  30 mL Oral Daily PRN Veronica Cartwright MD      methocarbamol  500 mg Oral Q8H PRN Veronica Cartwright MD      nicotine  1 patch Transdermal Daily Veronica Cartwright MD      nicotine polacrilex  4 mg Oral Q2H PRN Veronica Cartwright MD      OLANZapine  2 5 mg Oral Q8H PRN Charlie Smith MD      OLANZapine  5 mg Oral Q3H PRN Charlie Smith MD      OLANZapine  7 5 mg Oral Q3H PRN Charlie Smith MD      OLANZapine  10 mg Intramuscular Q3H PRN Charlie Smith MD      OLANZapine  5 mg Intramuscular Q3H PRN Charlie Smith MD      OLANZapine  10 mg Oral HS Charlie Smith MD      OLANZapine  15 mg Oral Daily Charlie Smith MD      paliperidone palmitate ER  234 mg Intramuscular Q30 Days Charlie Smith MD      pantoprazole  40 mg Oral Early Morning Charile Smith MD      Pramox-PE-Glycerin-Petrolatum  1 application Rectal 4x Daily PRN Charlie Smith MD      prazosin  1 mg Oral HS Charlie Smith MD      psyllium  1 packet Oral Daily Charlie Smith MD      sertraline  100 mg Oral Daily Charlie Smith MD          PRN:  Christian Gore  acetaminophen    acetaminophen    acetaminophen    al mag oxide-diphenhydramine-lidocaine viscous    albuterol    aluminum-magnesium hydroxide-simethicone    benztropine    benztropine    calcium carbonate    Diclofenac Sodium    haloperidol    hydrOXYzine HCL    hydrOXYzine HCL    LORazepam    magnesium hydroxide    methocarbamol    nicotine polacrilex    OLANZapine    OLANZapine    OLANZapine    OLANZapine    OLANZapine    Pramox-PE-Glycerin-Petrolatum    - Observation: routine    - VS: as per unit protocol  - Diet: Regular diet  - Psychoeducation (benefits and potential risks) discussed, importance of compliance with the psychiatric treatment reiterated, and the patient verbalized understanding of the matter  - Encourage group attendance and milieu therapy    - The pt was educated and agreed to verbalize any suicidal thoughts, frustrations or concerns to the nursing staff, immediately      - Dispo: pending psychiatric stabilization      Next of Kin  · Extended Emergency Contact Information  · Primary Emergency Contact: Lucio Brian  · Mobile Phone: [phone]  · Relation:   · Secondary Emergency Contact: Andrea Barron  · Work Phone: [phone]  · Relation:       Counseling / Coordination of Care     Total floor / unit time spent today 20 minutes  Greater than 50% of total time was spent with the patient and / or family counseling and / or coordination of care  A description of the counseling / coordination of care  Patient's Rights, confidentiality and exceptions to confidentiality, use of automated medical record, [facility] staff access to medical record, and consent to treatment reviewed      Mahsa Jose MD  Attending [phone]

## 2021-07-17 NOTE — NURSING NOTE
Pt is medication and meal compliant  Pt is visible in the milieu using pt phone and walking  Pt otherwise keeps to self and rests in bed  Pt denies s/s and has had no yelling outbursts this shift  Will continue to monitor

## 2021-07-17 NOTE — NURSING NOTE
Monthly invega injection given as ordered to left ventrogluteal region, tolerated well  Will continue to monitor

## 2021-07-17 NOTE — PLAN OF CARE
Problem: Alteration in Thoughts and Perception  Goal: Treatment Goal: Gain control of psychotic behaviors/thinking, reduce/eliminate presenting symptoms and demonstrate improved reality functioning upon discharge  Outcome: Progressing  Goal: Verbalize thoughts and feelings  Description: Interventions:  - Promote a nonjudgmental and trusting relationship with the patient through active listening and therapeutic communication  - Assess patient's level of functioning, behavior and potential for risk  - Engage patient in 1 on 1 interactions  - Encourage patient to express fears, feelings, frustrations, and discuss symptoms    - [place] patient to reality, help patient recognize reality-based thinking   - Administer medications as ordered and assess for potential side effects  - Provide the patient education related to the signs and symptoms of the illness and desired effects of prescribed medications  Outcome: Progressing  Goal: Refrain from acting on delusional thinking/internal stimuli  Description: Interventions:  - Monitor patient closely, per order   - Utilize least restrictive measures   - Set reasonable limits, give positive feedback for acceptable   - Administer medications as ordered and monitor of potential side effects  Outcome: Progressing  Goal: Agree to be compliant with medication regime, as prescribed and report medication side effects  Description: Interventions:  - Offer appropriate PRN medication and supervise ingestion; conduct AIMS, as needed   Outcome: Progressing  Goal: Attend and participate in unit activities, including therapeutic, recreational, and educational groups  Description: Interventions:  -Encourage Visitation and family involvement in care  Outcome: Progressing  Goal: Recognize dysfunctional thoughts, communicate reality-based thoughts at the time of discharge  Description: Interventions:  - Provide medication and psycho-education to assist patient in compliance and developing insight into his/her illness   Outcome: Progressing  Goal: Complete daily ADLs, including personal hygiene independently, as able  Description: Interventions:  - Observe, teach, and assist patient with ADLS  - Monitor and promote a balance of rest/activity, with adequate nutrition and elimination   Outcome: Progressing     Problem: Ineffective Coping  Goal: Cooperates with admission process  Description: Interventions:   - Complete admission process  Outcome: Progressing  Goal: Identifies ineffective coping skills  Outcome: Progressing  Goal: Identifies healthy coping skills  Outcome: Progressing  Goal: Demonstrates healthy coping skills  Outcome: Progressing  Goal: Participates in unit activities  Description: Interventions:  - Provide therapeutic environment   - Provide required programming   - Redirect inappropriate behaviors   Outcome: Progressing  Goal: Patient/Family participate in treatment and DC plans  Description: Interventions:  - Provide therapeutic environment  Outcome: Progressing  Goal: Patient/Family verbalizes awareness of resources  Outcome: Progressing  Goal: Understands least restrictive measures  Description: Interventions:  - Utilize least restrictive behavior  Outcome: Progressing  Goal: Free from restraint events  Description: - Utilize least restrictive measures   - Provide behavioral interventions   - Redirect inappropriate behaviors   Outcome: Progressing     Problem: Risk for Self Injury/Neglect  Goal: Treatment Goal: Remain safe during length of stay, learn and adopt new coping skills, and be free of self-injurious ideation, impulses and acts at the time of discharge  Outcome: Progressing  Goal: Verbalize thoughts and feelings  Description: Interventions:  - Assess and re-assess patient's lethality and potential for self-injury  - Engage patient in 1:1 interactions, daily, for a minimum of 15 minutes  - Encourage patient to express feelings, fears, frustrations, hopes  - Establish rapport/trust with patient   Outcome: Progressing  Goal: Refrain from harming self  Description: Interventions:  - Monitor patient closely, per order  - Develop a trusting relationship  - Supervise medication ingestion, monitor effects and side effects   Outcome: Progressing  Goal: Attend and participate in unit activities, including therapeutic, recreational, and educational groups  Description: Interventions:  - Provide therapeutic and educational activities daily, encourage attendance and participation, and document same in the medical record  - Obtain collateral information, encourage visitation and family involvement in care   Outcome: Progressing  Goal: Recognize maladaptive responses and adopt new coping mechanisms  Outcome: Progressing  Goal: Complete daily ADLs, including personal hygiene independently, as able  Description: Interventions:  - Observe, teach, and assist patient with ADLS  - Monitor and promote a balance of rest/activity, with adequate nutrition and elimination  Outcome: Progressing     Problem: Depression  Goal: Treatment Goal: Demonstrate behavioral control of depressive symptoms, verbalize feelings of improved mood/affect, and adopt new coping skills prior to discharge  Outcome: Progressing  Goal: Verbalize thoughts and feelings  Description: Interventions:  - Assess and re-assess patient's level of risk   - Engage patient in 1:1 interactions, daily, for a minimum of 15 minutes   - Encourage patient to express feelings, fears, frustrations, hopes   Outcome: Progressing  Goal: Refrain from harming self  Description: Interventions:  - Monitor patient closely, per order   - Supervise medication ingestion, monitor effects and side effects   Outcome: Progressing  Goal: Refrain from isolation  Description: Interventions:  - Develop a trusting relationship   - Encourage socialization   Outcome: Progressing  Goal: Refrain from self-neglect  Outcome: Progressing  Goal: Attend and participate in unit activities, including therapeutic, recreational, and educational groups  Description: Interventions:  - Provide therapeutic and educational activities daily, encourage attendance and participation, and document same in the medical record   Outcome: Progressing  Goal: Complete daily ADLs, including personal hygiene independently, as able  Description: Interventions:  - Observe, teach, and assist patient with ADLS  -  Monitor and promote a balance of rest/activity, with adequate nutrition and elimination   Outcome: Progressing     Problem: Anxiety  Goal: Anxiety is at manageable level  Description: Interventions:  - Assess and monitor patient's anxiety level  - Monitor for signs and symptoms (heart palpitations, chest pain, shortness of breath, headaches, nausea, feeling jumpy, restlessness, irritable, apprehensive)  - Collaborate with interdisciplinary team and initiate plan and interventions as ordered    - [place] patient to unit/surroundings  - Explain treatment plan  - Encourage participation in care  - Encourage verbalization of concerns/fears  - Identify coping mechanisms  - Assist in developing anxiety-reducing skills  - Administer/offer alternative therapies  - Limit or eliminate stimulants  Outcome: Progressing     Problem: Risk for Violence/Aggression Toward Others  Goal: Treatment Goal: Refrain from acts of violence/aggression during length of stay, and demonstrate improved impulse control at the time of discharge  Outcome: Progressing  Goal: Verbalize thoughts and feelings  Description: Interventions:  - Assess and re-assess patient's level of risk, every waking shift  - Engage patient in 1:1 interactions, daily, for a minimum of 15 minutes   - Allow patient to express feelings and frustrations in a safe and non-threatening manner   - Establish rapport/trust with patient   Outcome: Progressing  Goal: Refrain from harming others  Outcome: Progressing  Goal: Refrain from destructive acts on the environment or property  Outcome: Progressing  Goal: Control angry outbursts  Description: Interventions:  - Monitor patient closely, per order  - Ensure early verbal de-escalation  - Monitor prn medication needs  - Set reasonable/therapeutic limits, outline behavioral expectations, and consequences   - Provide a non-threatening milieu, utilizing the least restrictive interventions   Outcome: Progressing  Goal: Attend and participate in unit activities, including therapeutic, recreational, and educational groups  Description: Interventions:  - Provide therapeutic and educational activities daily, encourage attendance and participation, and document same in the medical record   Outcome: Progressing  Goal: Identify appropriate positive anger management techniques  Description: Interventions:  - Offer anger management and coping skills groups   - Staff will provide positive feedback for appropriate anger control  Outcome: Progressing     Problem: Alteration in Orientation  Goal: Treatment Goal: Demonstrate a reduction of confusion and improved orientation to person, place, time and/or situation upon discharge, according to optimum baseline/ability  Outcome: Progressing  Goal: Interact with staff daily  Description: Interventions:  - Assess and re-assess patient's level of orientation  - Engage patient in 1 on 1 interactions, daily, for a minimum of 15 minutes   - Establish rapport/trust with patient   Outcome: Progressing  Goal: Express concerns related to confused thinking related to:  Description: Interventions:  - Encourage patient to express feelings, fears, frustrations, hopes  - Assign consistent caregivers   - [place]/re-orient patient as needed  - Allow comfort items, as appropriate  - Provide visual cues, signs, etc    Outcome: Progressing  Goal: Allow medical examinations, as recommended  Description: Interventions:  - Provide physical/neurological exams and/or referrals, per provider   Outcome: Progressing  Goal: Cooperate with recommended testing/procedures  Description: Interventions:  - Determine need for ancillary testing  - Observe for mental status changes  - Implement falls/precaution protocol   Outcome: Progressing  Goal: Attend and participate in unit activities, including therapeutic, recreational, and educational groups  Description: Interventions:  - Provide therapeutic and educational activities daily, encourage attendance and participation, and document same in the medical record   - Provide appropriate opportunities for reminiscence   - Provide a consistent daily routine   - Encourage family contact/visitation   Outcome: Progressing  Goal: Complete daily ADLs, including personal hygiene independently, as able  Description: Interventions:  - Observe, teach, and assist patient with ADLS  Outcome: Progressing     Problem: Individualized Interventions  Goal: Patient will verbalize appropriate use of telephone within 5 days  Description: Interventions:  - Treatment team to determine use of supervised phone privileges   Outcome: Progressing  Goal: Patient will verbalize need for hospitalization and will no longer attempt elopement within 5 days  Description: Interventions:  - Ongoing education to help patient understand need for hospitalization  Outcome: Progressing  Goal: Patient will recognize inappropriate behaviors and develop alternative behaviors within 5 days  Description: Interventions:  - Patient in collaboration with Treatment Team will develop a behavior management plan to help identify effective coping skills to deal with stressors  Outcome: Progressing

## 2021-07-18 PROCEDURE — 99232 SBSQ HOSP IP/OBS MODERATE 35: CPT | Performed by: STUDENT IN AN ORGANIZED HEALTH CARE EDUCATION/TRAINING PROGRAM

## 2021-07-18 RX ADMIN — LORAZEPAM 0.5 MG: 0.5 TABLET ORAL at 21:16

## 2021-07-18 RX ADMIN — HALOPERIDOL 1 MG: 1 TABLET ORAL at 11:31

## 2021-07-18 RX ADMIN — METHOCARBAMOL TABLETS 500 MG: 500 TABLET, COATED ORAL at 15:12

## 2021-07-18 RX ADMIN — HALOPERIDOL 1 MG: 1 TABLET ORAL at 08:52

## 2021-07-18 RX ADMIN — PANTOPRAZOLE SODIUM 40 MG: 40 TABLET, DELAYED RELEASE ORAL at 06:13

## 2021-07-18 RX ADMIN — FENOFIBRATE 145 MG: 145 TABLET ORAL at 08:53

## 2021-07-18 RX ADMIN — HALOPERIDOL 1 MG: 1 TABLET ORAL at 17:26

## 2021-07-18 RX ADMIN — LORAZEPAM 0.5 MG: 0.5 TABLET ORAL at 17:26

## 2021-07-18 RX ADMIN — SERTRALINE HYDROCHLORIDE 100 MG: 100 TABLET ORAL at 08:53

## 2021-07-18 RX ADMIN — OLANZAPINE 10 MG: 10 TABLET, FILM COATED ORAL at 21:15

## 2021-07-18 RX ADMIN — DIPHENHYDRAMINE HCL 25 MG: 25 TABLET ORAL at 21:15

## 2021-07-18 RX ADMIN — GABAPENTIN 600 MG: 300 CAPSULE ORAL at 21:15

## 2021-07-18 RX ADMIN — NICOTINE 1 PATCH: 21 PATCH, EXTENDED RELEASE TRANSDERMAL at 08:55

## 2021-07-18 RX ADMIN — OLANZAPINE 15 MG: 5 TABLET, FILM COATED ORAL at 08:52

## 2021-07-18 RX ADMIN — BENZTROPINE MESYLATE 1 MG: 1 TABLET ORAL at 17:26

## 2021-07-18 RX ADMIN — GABAPENTIN 600 MG: 300 CAPSULE ORAL at 11:31

## 2021-07-18 RX ADMIN — BENZTROPINE MESYLATE 1 MG: 1 TABLET ORAL at 08:52

## 2021-07-18 RX ADMIN — GABAPENTIN 600 MG: 300 CAPSULE ORAL at 17:26

## 2021-07-18 RX ADMIN — LORAZEPAM 0.5 MG: 0.5 TABLET ORAL at 11:31

## 2021-07-18 RX ADMIN — LORAZEPAM 0.5 MG: 0.5 TABLET ORAL at 08:53

## 2021-07-18 RX ADMIN — LIDOCAINE 1 PATCH: 50 PATCH CUTANEOUS at 08:51

## 2021-07-18 RX ADMIN — PSYLLIUM HUSK 1 PACKET: 3.4 POWDER ORAL at 08:51

## 2021-07-18 RX ADMIN — GABAPENTIN 600 MG: 300 CAPSULE ORAL at 08:52

## 2021-07-18 RX ADMIN — HALOPERIDOL 1 MG: 1 TABLET ORAL at 21:16

## 2021-07-18 NOTE — NURSING NOTE
Patient is calm and pleasant  She is cooperative and visible There was  no mention of any hallucinations  Patient is compliant with medication and unit routine  There were no loud outbursts; no yelling   No need for redirection

## 2021-07-18 NOTE — NURSING NOTE
Pt is medication and meal compliant  Pt is pleasant and polite in conversation and has been social with roommate and select peers when out in milieu  Pt uses pt phone often, but cooperative with sharing  Pt denies s/s currently and has been attending groups today  Pt has had no outbursts or episodes of yelling during shift  Will continue to monitor

## 2021-07-18 NOTE — PROGRESS NOTES
07/17/21 1300   Activity/Group Checklist   Group Other (Comment)  (OPEN STUDIO Art Therapy/Social Group, Free-Expression)   Attendance Attended   Attendance Duration (min) Greater than 60   Interactions Interacted appropriately   Affect/Mood Appropriate   Goals Achieved Able to listen to others; Able to engage in interactions

## 2021-07-18 NOTE — PROGRESS NOTES
Progress Note - Behavioral Health   Mahalia Ormond 49 y o  female MRN: [id number]  [facility] Encounter: [id number]       Patient was visited on unit for continuing care; chart reviewed and discussed with the nursing staff  On approach, the patient was calm, more pleasant and cooperative  Reported feeling much better since yesterday, noted that additional Haldol has helped her, and denied any gustatory hallucinations since yesterday and denied A/VH currently  Endorsed good mood, appetite, and energy level  No problem initiating and maintaining sleep  Denied SI/HI, intent or plan upon direct inquiry at this time  Patient continues to be interacitve with staff and peers, and remained in good behavioral control  No reports of aggression or self-injurious behavior on unit  No PRN medications used in the past 24 hours  Patient accepted all offered medications and reported feeling better  No adverse effects of medications noted or reported          Current Mental Status Evaluation:  Appearance and attitude: appeared as stated age, casually dressed with good hygiene  Eye contact: good  Motor Function: within normal limits, intact gait, No PMA/PMR  Gait/station: normal gait/station and normal balance  Speech: normal for rate, rhythm, volume, latency, amount  Language: Able to name objects and No overt abnormality  Mood/affect: euthymic / Affect was constricted but reactive, mood congruent  Thought Processes: linear  Thought content: denied suicidal ideations or homicidal ideations, no overt delusions elicited  Associations: concrete associations  Perceptual disturbances: denies Auditory/Visual/Tactile Hallucinations; denied any gustatory hallucination since yesterday  Orientation: oriented to place and person  Cognitive Function: intact  Memory: not cooperative with formal MMSE  Intellect: unable to assess  Fund of knowledge: aware of current events, aware of past history and vocabulary average  Impulse control: good  Insight/judgment: fair/fair    Pain: denied  Pain scale: 0    Vital signs in last 24 hours:    Temp:  [97 3 °F (36 3 °C)-98 3 °F (36 8 °C)] 98 3 °F (36 8 °C)  HR:  [82-91] 85  Resp:  [18-20] 18  BP: (103-112)/(55-74) 103/61    Laboratory results: I have personally reviewed all pertinent laboratory/tests results    Most Recent Labs:   Lab Results   Component Value Date    WBC 6 80 04/09/2021    RBC 4 32 04/09/2021    HGB 13 0 04/09/2021    HCT 39 5 04/09/2021     04/09/2021    RDW 14 3 04/09/2021    NEUTROABS 7 10 (H) 03/16/2021    SODIUM 139 05/03/2021    K 4 4 05/03/2021     05/03/2021    CO2 30 05/03/2021    BUN 14 05/03/2021    CREATININE 0 61 05/03/2021    GLUC 94 05/03/2021    CALCIUM 9 8 05/03/2021    AST 25 05/03/2021    ALT 18 05/03/2021    ALKPHOS 56 05/03/2021    TP 7 2 05/03/2021    ALB 4 2 05/03/2021    TBILI 0 25 05/03/2021    CHOLESTEROL 300 (H) 03/31/2021    HDL 57 03/31/2021    TRIG 658 (H) 03/31/2021    LDLCALC  03/31/2021      Comment:      Calculated LDL invalid, triglycerides >400 mg/dl    NONHDLC 147 08/23/2020    AMMONIA 28 10/27/2017    DOT8OJTUNJSB 3 810 04/09/2021    FREET4 0 89 03/24/2016    PREGUR negative 03/16/2021    PREGSERUM Negative 10/21/2019    HCG <2 00 04/10/2014    RPR Non-Reactive 03/31/2021    HGBA1C 5 0 08/06/2019    EAG 97 08/06/2019       Progress Toward Goals: improving    Assessment:  Principal Problem:    Schizoaffective disorder, bipolar type ([address] )  Active Problems:    Post-traumatic stress disorder, chronic    Medical clearance for psychiatric admission    Mild intermittent asthma without complication    Ear problem, bilateral    Gastroesophageal reflux disease    Right foot pain    Ear problem, right        Plan:  - Continue medication titration and treatment plan; adjust medication to optimize treatment response and as clinically indicated       Scheduled medications:  Current Facility-Administered Medications   Medication Dose Route Frequency Provider Last Rate    acetaminophen  650 mg Oral Q6H PRN Jayne Chapman MD      acetaminophen  650 mg Oral Q4H PRN Jayne Chapman MD      acetaminophen  975 mg Oral Q6H PRN Jayne Chapman, MD      al mag oxide-diphenhydramine-lidocaine viscous  10 mL Swish & Swallow Q4H PRN Jayne Chapman MD      albuterol  2 puff Inhalation Q6H PRN Jayne Chapman MD      aluminum-magnesium hydroxide-simethicone  15 mL Oral Q4H PRN Jyane Chapman MD      benztropine  1 mg Intramuscular Q4H PRN Max 6/day Jayne Chapman MD      benztropine  1 mg Oral Q4H PRN Max 6/day Jayne Chapman MD      benztropine  1 mg Oral BID Jayne Chapman MD      calcium carbonate  500 mg Oral TID PRN Jayne Chapman MD      Diclofenac Sodium  2 g Topical 4x Daily PRN Jayne Chapman MD      diphenhydrAMINE  25 mg Oral HS Jayne Chapman, MD      fenofibrate  145 mg Oral Daily Jayne Chapman MD      gabapentin  600 mg Oral 4x Daily Jayne Chapman MD      haloperidol  1 mg Oral 4x Daily Jayne Chapman MD      haloperidol  5 mg Oral Q6H PRN Jayne Chapman, MD      hydrOXYzine HCL  25 mg Oral Q6H PRN Max 4/day Jayne Chapman MD      hydrOXYzine HCL  50 mg Oral Q6H PRN Max 4/day Jayne Chapman MD      lidocaine  1 patch Topical Daily Jayne Chapman MD      LORazepam  1 mg Intramuscular Q6H PRN Jayne Chapman MD      LORazepam  0 5 mg Oral 4x Daily Jayne Chapman MD      magnesium hydroxide  30 mL Oral Daily PRN Jayne Chapman MD      methocarbamol  500 mg Oral Q8H PRN Jayne Chapman MD      nicotine  1 patch Transdermal Daily Jayne Chapman MD      nicotine polacrilex  4 mg Oral Q2H PRN Jayne Chapman, MD      OLANZapine  2 5 mg Oral Q8H PRN Jayne Chapman MD      OLANZapine  5 mg Oral Q3H PRN Jayne Chapman MD      OLANZapine  7 5 mg Oral Q3H PRN Jayne Chapman, MD      OLANZapine  10 mg Intramuscular Q3H PRN Jayne Chapman MD      OLANZapine  5 mg Intramuscular Q3H PRN Jayne Chapman MD      OLANZapine  10 mg Oral HS Jayne Chapman MD      OLANZapine  15 mg Oral Daily Jayne Chapman MD  paliperidone palmitate ER  234 mg Intramuscular Q30 Days Melanie Treadwell MD      pantoprazole  40 mg Oral Early Morning Melanie Treadwell MD      Pramox-PE-Glycerin-Petrolatum  1 application Rectal 4x Daily PRN Melanie Treadwell MD      prazosin  1 mg Oral HS Melanie Treadwell MD      psyllium  1 packet Oral Daily Melanie Treadwell MD      sertraline  100 mg Oral Daily Melanie Treadwell MD          PRN:  Willi Lara  acetaminophen    acetaminophen    acetaminophen    al mag oxide-diphenhydramine-lidocaine viscous    albuterol    aluminum-magnesium hydroxide-simethicone    benztropine    benztropine    calcium carbonate    Diclofenac Sodium    haloperidol    hydrOXYzine HCL    hydrOXYzine HCL    LORazepam    magnesium hydroxide    methocarbamol    nicotine polacrilex    OLANZapine    OLANZapine    OLANZapine    OLANZapine    OLANZapine    Pramox-PE-Glycerin-Petrolatum    - Observation: routine    - VS: as per unit protocol  - Diet: Regular diet  - Psychoeducation (benefits and potential risks) discussed, importance of compliance with the psychiatric treatment reiterated, and the patient verbalized understanding of the matter  - Encourage group attendance and milieu therapy    - The pt was educated and agreed to verbalize any suicidal thoughts, frustrations or concerns to the nursing staff, immediately  - Dispo: Pending psychiatric stabilization      Next of Kin  · Extended Emergency Contact Information  · Primary Emergency Contact: Marina Schneider  · Mobile Phone: [phone]  · Relation:   · Secondary Emergency Contact: Clyde Canela  · Work Phone: [phone]  · Relation:       Counseling / Coordination of Care     Total floor / unit time spent today 20 minutes  Greater than 50% of total time was spent with the patient and / or family counseling and / or coordination of care  A description of the counseling / coordination of care        Patient's Rights, confidentiality and exceptions to confidentiality, use of automated medical record, [address] [place] [address] staff access to medical record, and consent to treatment reviewed      Jose Marie MD  Attending [phone]

## 2021-07-18 NOTE — NURSING NOTE
Patient is visible, pleasant, polite and cooperative  She came to writer requesting "muscle relaxer" for discomfort/pain and stiffness in lower extremities  She received Robaxin 500 mg at 1515  She later stated the Robaxin works well  She commented on how good she is feeling  She said "I am doing better than I have been in years  She stated that in the past, after discharge she "sometimes would not even fill her prescriptions"  She said she plans on being compliant with her medications   She said she is going to learn how to take the bus; learn the schedules, "something I haven't done in years"  She plans on seeing her mom and said her mom has remained a constant support "no matter what I put her through"  Prazosin was held at [facility] due to hypotensive parameters

## 2021-07-19 PROCEDURE — 99232 SBSQ HOSP IP/OBS MODERATE 35: CPT | Performed by: PSYCHIATRY & NEUROLOGY

## 2021-07-19 RX ADMIN — LORAZEPAM 0.5 MG: 0.5 TABLET ORAL at 09:02

## 2021-07-19 RX ADMIN — PHENYTOIN 1 MG: 125 SUSPENSION ORAL at 21:00

## 2021-07-19 RX ADMIN — BENZTROPINE MESYLATE 1 MG: 1 TABLET ORAL at 09:03

## 2021-07-19 RX ADMIN — LORAZEPAM 0.5 MG: 0.5 TABLET ORAL at 17:08

## 2021-07-19 RX ADMIN — GABAPENTIN 600 MG: 300 CAPSULE ORAL at 21:00

## 2021-07-19 RX ADMIN — GABAPENTIN 600 MG: 300 CAPSULE ORAL at 17:08

## 2021-07-19 RX ADMIN — HALOPERIDOL 1 MG: 1 TABLET ORAL at 17:08

## 2021-07-19 RX ADMIN — PSYLLIUM HUSK 1 PACKET: 3.4 POWDER ORAL at 09:07

## 2021-07-19 RX ADMIN — GABAPENTIN 600 MG: 300 CAPSULE ORAL at 09:03

## 2021-07-19 RX ADMIN — PANTOPRAZOLE SODIUM 40 MG: 40 TABLET, DELAYED RELEASE ORAL at 06:09

## 2021-07-19 RX ADMIN — BENZTROPINE MESYLATE 1 MG: 1 TABLET ORAL at 17:08

## 2021-07-19 RX ADMIN — LIDOCAINE 1 PATCH: 50 PATCH CUTANEOUS at 09:07

## 2021-07-19 RX ADMIN — FENOFIBRATE 145 MG: 145 TABLET ORAL at 09:03

## 2021-07-19 RX ADMIN — SERTRALINE HYDROCHLORIDE 100 MG: 100 TABLET ORAL at 09:03

## 2021-07-19 RX ADMIN — NICOTINE 1 PATCH: 21 PATCH, EXTENDED RELEASE TRANSDERMAL at 09:07

## 2021-07-19 RX ADMIN — LORAZEPAM 0.5 MG: 0.5 TABLET ORAL at 21:00

## 2021-07-19 RX ADMIN — DIPHENHYDRAMINE HCL 25 MG: 25 TABLET ORAL at 21:00

## 2021-07-19 RX ADMIN — GABAPENTIN 600 MG: 300 CAPSULE ORAL at 12:19

## 2021-07-19 RX ADMIN — HALOPERIDOL 1 MG: 1 TABLET ORAL at 21:00

## 2021-07-19 RX ADMIN — OLANZAPINE 10 MG: 10 TABLET, FILM COATED ORAL at 21:00

## 2021-07-19 RX ADMIN — HALOPERIDOL 1 MG: 1 TABLET ORAL at 12:19

## 2021-07-19 RX ADMIN — OLANZAPINE 15 MG: 5 TABLET, FILM COATED ORAL at 09:02

## 2021-07-19 RX ADMIN — LORAZEPAM 0.5 MG: 0.5 TABLET ORAL at 12:19

## 2021-07-19 RX ADMIN — DICLOFENAC SODIUM 2 G: 10 GEL TOPICAL at 15:48

## 2021-07-19 RX ADMIN — HALOPERIDOL 1 MG: 1 TABLET ORAL at 09:03

## 2021-07-19 NOTE — PROGRESS NOTES
Psychiatry Progress Note [facility] 49 y o  female MRN: [id number]  Unit/Bed#: [facility] 967-60 Encounter: [id number]  Code Status: Level 1 - Full Code    PCP: Gal Perry DO    Date of Admission:  4/7/2021 1435   Date of Service:  07/19/21    Patient Active Problem List   Diagnosis    [facility]    Uncomplicated alcohol dependence ([facility] )    Suicidal behavior    LYNN (generalized anxiety disorder)    Slow transit constipation    Deficiency of vitamin B    Degenerative disc disease, lumbar    Post-traumatic stress disorder, chronic    Lower extremity weakness    Generalized abdominal pain    Non-intractable vomiting    Medical clearance for psychiatric admission    Carbuncle    Alcohol dependence with unspecified alcohol-induced disorder ([facility] )    Mild intermittent asthma without complication    Tobacco abuse    Ear problem, bilateral    Gastroesophageal reflux disease    Right foot pain    Ear problem, right    Injury of head    Acute sinusitis    Chlamydial cervicitis     Review of systems:    Needed Robaxyn for back pain,   Diagnosis:          Schizoaffective Bipolar, alcohol use disorder episodic in early remission in controled environment   Assessment   Overall Status:       Still delusional and paranoid at times, did need prn zyprexa on Friday night and not since then    Certification Statement: The patient will continue to require additional inpatient hospital stay due to ongoing mood swings paranoia   Acceptance by patient:  accepting    Hopefulness in recovery:  Living in a group home   Understanding of medications: has good understanding   Involved in reintegration process:  Adjusting to the unit   Trusting in relationship with psychiatrist:  trusting   Justification for dual anti-psychotics: due to lack of response to single antipsychotics   Side effects from treatment: none    Medication changes    none today  Medication Education; Risks and S/E and precautions of all meds given to patient and she did verbalize an understanding   Non-pharmacological treatments   Continue with individual, group, milieu and occupational therapy using recovery principles and psycho-education about accepting illness and the need for treatment  Safety   Safety and communication plan established to target dynamic risk factors discussed above  Discharge Plan     most likely to a group home with the act team again     Interval Progress     No need for prns last two days since Friday she needed prn Zprexa for hearing voices and screaming once   Still paranoid, feels hypnotized by others and still feels recievng messages from TV , people stalking her from upstairs and hearing voices through the vents on the ceiling off and on,Attending groups, anticipating to be discharged as Clare Rhodes did accept him, discharge pending CSP meting and  by ACT team   Sleep:                                 good   appetite:                              good   compliance with medications :          good   Side  Effects:                                     None reported   significant events:                              Yelling and screaming    group attendance:                              Attending most of the groups,     Mental Status Exam  Appearance: age appropriate, casually dressed, looks older than stated age, overweight   Found in the dining acharya working on her journaling  Behavior: normal, pleasant, cooperative, appears anxious, mildly anxious,  more friendly today and not confrontational      Friendly pleasant anticipating placement at the group home   Speech: fluent, clear, coherent, increased rate, pressured, hypertalkative,   Circumstantial pressured    Mood: depressed, anxious, euphoric  Affect: labile, reactive, slightly brighter    Thought Process: normal abstract reasoning, less prominent, tends to be pressured and circumstantial and preoccupied perseverating on discharge    Thought Content: some paranoia, ideas of reference, but does appear as if paranoid  No current suicidal homicidal thoughts intent or plans verbalized  No phobias obsessions compulsions or distorted body perceptions elicited  Also believes TV is sending her messages from movie stars checking her if she is good or bad and threatening to get the feds to go after her       Still believes staff maybe doing hypnosis on her   Perceptual Disturbances: no auditory hallucinations, no visual hallucinations, denies auditory hallucinations when asked, does not appear responding to internal stimuli, no voices reported today   No voices reported today  Hx Risk Factors: chronic mood disorder, chronic psychotic symptoms, alcohol use, limted insight  Sensorium:          Oriented to situation and to 3 spheres  Cognition: recent and remote memory grossly intact  Consciousness: alert and awake  Attention: attention span and concentration are age appropriate  Intellect: appears to be of average intelligence  Insight: improving  Judgement: improving  Motor Activity: no abnormal movements     Vitals  Temp:  [98 °F (36 7 °C)] 98 °F (36 7 °C)  HR:  [91-95] 92  Resp:  [16-18] 18  BP: ()/(60-66) 106/66  No intake or output data in the 24 hours ending 07/19/21 0853    Lab Results: No New Labs Available For Today     Current Facility-Administered Medications   Medication Dose Route Frequency Provider Last Rate    acetaminophen  650 mg Oral Q6H PRN Dmitriy Ornelas MD      acetaminophen  650 mg Oral Q4H PRN Dmitriy Ornelas MD      acetaminophen  975 mg Oral Q6H PRN Dmitriy Ornelas MD      al mag oxide-diphenhydramine-lidocaine viscous  10 mL Swish & Swallow Q4H PRN Dmitriy Ornelas MD      albuterol  2 puff Inhalation Q6H PRN Dmitriy Ornelas MD      aluminum-magnesium hydroxide-simethicone  15 mL Oral Q4H PRN Dmirtiy Ornelas MD      benztropine  1 mg Intramuscular Q4H PRN Max 6/day Alonso Renee Lukasz Watson MD      benztropine  1 mg Oral Q4H PRN Max 6/day Mariel Philip MD      benztropine  1 mg Oral BID Mariel Philip MD      calcium carbonate  500 mg Oral TID PRN Mariel Philip MD      Diclofenac Sodium  2 g Topical 4x Daily PRN Mariel Philip MD      diphenhydrAMINE  25 mg Oral HS Mariel Philip MD      fenofibrate  145 mg Oral Daily Mariel Philip MD      gabapentin  600 mg Oral 4x Daily Mariel Philip MD      haloperidol  1 mg Oral 4x Daily Mariel Philip MD      haloperidol  5 mg Oral Q6H PRN Mariel Philip MD      hydrOXYzine HCL  25 mg Oral Q6H PRN Max 4/day Mariel Philip MD      hydrOXYzine HCL  50 mg Oral Q6H PRN Max 4/day Mariel Philip MD      lidocaine  1 patch Topical Daily Mariel Philip MD      LORazepam  1 mg Intramuscular Q6H PRN Mariel Philip MD      LORazepam  0 5 mg Oral 4x Daily Mariel Philip MD      magnesium hydroxide  30 mL Oral Daily PRN Mariel Philip MD      methocarbamol  500 mg Oral Q8H PRN Mariel Philip MD      nicotine  1 patch Transdermal Daily Mariel Philip MD      nicotine polacrilex  4 mg Oral Q2H PRN Mariel Philip MD      OLANZapine  2 5 mg Oral Q8H PRN Mariel Philip MD      OLANZapine  5 mg Oral Q3H PRN Mariel Philip MD      OLANZapine  7 5 mg Oral Q3H PRN Mariel Philip MD      OLANZapine  10 mg Intramuscular Q3H PRN Mariel Philip MD      OLANZapine  5 mg Intramuscular Q3H PRN Mariel Philip MD      OLANZapine  10 mg Oral HS Mariel Philip MD      OLANZapine  15 mg Oral Daily Mariel Philip MD      paliperidone palmitate ER  234 mg Intramuscular Q30 Days Mariel Philip MD      pantoprazole  40 mg Oral Early Morning Mariel Philip MD      Pramox-PE-Glycerin-Petrolatum  1 application Rectal 4x Daily PRN Mariel Philip MD      prazosin  1 mg Oral HS Mariel Philip MD      psyllium  1 packet Oral Daily Mariel Philip MD      sertraline  100 mg Oral Daily Mariel Philip MD         Counseling / Coordination of Care: Total floor / unit time spent today 15 minutes   Greater than 50% of total time was spent with the patient and / or family counseling and / or somewhat receptive to supportive listening and teaching positive coping skills to deal with symptom mangement  Patient's Rights, confidentiality and exceptions to confidentiality, use of automated medical record, [facility] staff access to medical record, and consent to treatment reviewed  This note was  not shared with the patient because it might further aggravate her psychiatric condition  This note has been dictated

## 2021-07-19 NOTE — NURSING NOTE
Patient reports having some anxiety about going home  She has been out in milieu and minimally interacts with other  Her affect is blunted

## 2021-07-19 NOTE — PROGRESS NOTES
07/19/21 1041   Team Meeting   Meeting Type Daily Rounds   Initial Conference Date 07/19/21   Patient/Family Present   Patient Present No   Patient's Family Present No       Daily Rounds Documentation  Team Members Participating  MD Chepe Messer, YUDY Scott, DECLANW  Pranav Avila, MARK ANTHONY Lema, ANNALISE Rodríguez RN    Case reviewed  No changes  Had Robaxin for lower extremity pain  Medication compliant  No report of outbursts  Behaviors controlled   Awaiting for [facility] ACT to officially open her / then discharge to [facility]), likely the last week of July or first week of August

## 2021-07-19 NOTE — PLAN OF CARE
Problem: Alteration in Thoughts and Perception  Goal: Treatment Goal: Gain control of psychotic behaviors/thinking, reduce/eliminate presenting symptoms and demonstrate improved reality functioning upon discharge  Outcome: Progressing  Goal: Verbalize thoughts and feelings  Description: Interventions:  - Promote a nonjudgmental and trusting relationship with the patient through active listening and therapeutic communication  - Assess patient's level of functioning, behavior and potential for risk  - Engage patient in 1 on 1 interactions  - Encourage patient to express fears, feelings, frustrations, and discuss symptoms    - [place] patient to reality, help patient recognize reality-based thinking   - Administer medications as ordered and assess for potential side effects  - Provide the patient education related to the signs and symptoms of the illness and desired effects of prescribed medications  Outcome: Progressing  Goal: Refrain from acting on delusional thinking/internal stimuli  Description: Interventions:  - Monitor patient closely, per order   - Utilize least restrictive measures   - Set reasonable limits, give positive feedback for acceptable   - Administer medications as ordered and monitor of potential side effects  Outcome: Progressing  Goal: Agree to be compliant with medication regime, as prescribed and report medication side effects  Description: Interventions:  - Offer appropriate PRN medication and supervise ingestion; conduct AIMS, as needed   Outcome: Progressing  Goal: Attend and participate in unit activities, including therapeutic, recreational, and educational groups  Description: Interventions:  -Encourage Visitation and family involvement in care  Outcome: Progressing  Goal: Recognize dysfunctional thoughts, communicate reality-based thoughts at the time of discharge  Description: Interventions:  - Provide medication and psycho-education to assist patient in compliance and developing insight into his/her illness   Outcome: Progressing  Goal: Complete daily ADLs, including personal hygiene independently, as able  Description: Interventions:  - Observe, teach, and assist patient with ADLS  - Monitor and promote a balance of rest/activity, with adequate nutrition and elimination   Outcome: Progressing     Problem: Ineffective Coping  Goal: Cooperates with admission process  Description: Interventions:   - Complete admission process  Outcome: Progressing  Goal: Identifies ineffective coping skills  Outcome: Progressing  Goal: Identifies healthy coping skills  Outcome: Progressing  Goal: Demonstrates healthy coping skills  Outcome: Progressing  Goal: Participates in unit activities  Description: Interventions:  - Provide therapeutic environment   - Provide required programming   - Redirect inappropriate behaviors   Outcome: Progressing  Goal: Patient/Family participate in treatment and DC plans  Description: Interventions:  - Provide therapeutic environment  Outcome: Progressing  Goal: Patient/Family verbalizes awareness of resources  Outcome: Progressing  Goal: Understands least restrictive measures  Description: Interventions:  - Utilize least restrictive behavior  Outcome: Progressing  Goal: Free from restraint events  Description: - Utilize least restrictive measures   - Provide behavioral interventions   - Redirect inappropriate behaviors   Outcome: Progressing     Problem: Risk for Self Injury/Neglect  Goal: Treatment Goal: Remain safe during length of stay, learn and adopt new coping skills, and be free of self-injurious ideation, impulses and acts at the time of discharge  Outcome: Progressing

## 2021-07-19 NOTE — NURSING NOTE
Patient was visible in the milieu but did spend time in  the evening in bed talking with her roommate whom she is very helpful and understanding with  She c/o bilateral foot pain and used Volteran gel at [phone] with good relief  She asked about getting "the Hep A vaccine" but was informed we cannot administer that here  She did not mention voices or hearing anyone on the floor above us, nor being paranoid about being posessed  She brightens on approach and her conversation was logical and goal directed  She did ask for something for sleep with her  meds  Writer said she would endorse to the next nursing shift and she should mention to her doctor

## 2021-07-19 NOTE — PROGRESS NOTES
Patient did very well in group talking about her she does not know how to feel  She stated that she does not know what is normal for her  She went onto explain how she has changed and needs to find out what she needs to accept/change

## 2021-07-19 NOTE — PLAN OF CARE
Problem: Alteration in Thoughts and Perception  Goal: Verbalize thoughts and feelings  Description: Interventions:  - Promote a nonjudgmental and trusting relationship with the patient through active listening and therapeutic communication  - Assess patient's level of functioning, behavior and potential for risk  - Engage patient in 1 on 1 interactions  - Encourage patient to express fears, feelings, frustrations, and discuss symptoms    - [place] patient to reality, help patient recognize reality-based thinking   - Administer medications as ordered and assess for potential side effects  - Provide the patient education related to the signs and symptoms of the illness and desired effects of prescribed medications  Outcome: Progressing     Problem: Ineffective Coping  Goal: Participates in unit activities  Description: Interventions:  - Provide therapeutic environment   - Provide required programming   - Redirect inappropriate behaviors   Outcome: Progressing     Patient completed Goal Card for the week of 7/19/21  Goals: Attend all groups              Practice relaxation skills              Meditation/prayer

## 2021-07-20 PROCEDURE — 99232 SBSQ HOSP IP/OBS MODERATE 35: CPT | Performed by: PSYCHIATRY & NEUROLOGY

## 2021-07-20 RX ADMIN — GABAPENTIN 600 MG: 300 CAPSULE ORAL at 21:03

## 2021-07-20 RX ADMIN — HALOPERIDOL 1 MG: 1 TABLET ORAL at 17:20

## 2021-07-20 RX ADMIN — FENOFIBRATE 145 MG: 145 TABLET ORAL at 08:45

## 2021-07-20 RX ADMIN — HALOPERIDOL 1 MG: 1 TABLET ORAL at 08:45

## 2021-07-20 RX ADMIN — OLANZAPINE 15 MG: 5 TABLET, FILM COATED ORAL at 08:44

## 2021-07-20 RX ADMIN — PHENYTOIN 1 MG: 125 SUSPENSION ORAL at 21:03

## 2021-07-20 RX ADMIN — NICOTINE 1 PATCH: 21 PATCH, EXTENDED RELEASE TRANSDERMAL at 08:45

## 2021-07-20 RX ADMIN — LORAZEPAM 0.5 MG: 0.5 TABLET ORAL at 12:24

## 2021-07-20 RX ADMIN — GABAPENTIN 600 MG: 300 CAPSULE ORAL at 08:44

## 2021-07-20 RX ADMIN — HALOPERIDOL 1 MG: 1 TABLET ORAL at 21:03

## 2021-07-20 RX ADMIN — BENZTROPINE MESYLATE 1 MG: 1 TABLET ORAL at 17:20

## 2021-07-20 RX ADMIN — LIDOCAINE 1 PATCH: 50 PATCH CUTANEOUS at 08:45

## 2021-07-20 RX ADMIN — DIPHENHYDRAMINE HCL 25 MG: 25 TABLET ORAL at 21:03

## 2021-07-20 RX ADMIN — LORAZEPAM 0.5 MG: 0.5 TABLET ORAL at 08:45

## 2021-07-20 RX ADMIN — PANTOPRAZOLE SODIUM 40 MG: 40 TABLET, DELAYED RELEASE ORAL at 06:25

## 2021-07-20 RX ADMIN — LORAZEPAM 0.5 MG: 0.5 TABLET ORAL at 17:20

## 2021-07-20 RX ADMIN — GABAPENTIN 600 MG: 300 CAPSULE ORAL at 12:24

## 2021-07-20 RX ADMIN — LORAZEPAM 0.5 MG: 0.5 TABLET ORAL at 21:03

## 2021-07-20 RX ADMIN — DICLOFENAC SODIUM 2 G: 10 GEL TOPICAL at 09:16

## 2021-07-20 RX ADMIN — GABAPENTIN 600 MG: 300 CAPSULE ORAL at 17:20

## 2021-07-20 RX ADMIN — HALOPERIDOL 1 MG: 1 TABLET ORAL at 12:24

## 2021-07-20 RX ADMIN — PSYLLIUM HUSK 1 PACKET: 3.4 POWDER ORAL at 08:45

## 2021-07-20 RX ADMIN — SERTRALINE HYDROCHLORIDE 100 MG: 100 TABLET ORAL at 08:45

## 2021-07-20 RX ADMIN — ACETAMINOPHEN 975 MG: 325 TABLET ORAL at 13:26

## 2021-07-20 RX ADMIN — OLANZAPINE 10 MG: 10 TABLET, FILM COATED ORAL at 21:02

## 2021-07-20 RX ADMIN — BENZTROPINE MESYLATE 1 MG: 1 TABLET ORAL at 08:45

## 2021-07-20 NOTE — PROGRESS NOTES
07/20/21 1137   Team Meeting   Meeting Type Daily Rounds   Initial Conference Date 07/20/21   Patient/Family Present   Patient Present No   Patient's Family Present No       Daily Rounds Documentation  Team Members Participating  MD Edwin Lord, RN  Shabnam Client, LSW  Alab Fonseca, LSW  Otilia Eye, CPS    Case reviewed  Voltarin gel for ankle pain  Discussed Hep vaccine she requested, liklihood it is not covered by insurance, RN addressing  No issues or outbursts noted  Awaiting firm discharge date to be set / based upon when [facility] can open her for services  Errol from [facility] on vacation this week but will be available next week  Will schedule CSP and set firm discharge date once providers identify availability

## 2021-07-20 NOTE — PLAN OF CARE
Problem: Nutrition/Hydration-ADULT  Goal: Nutrient/Hydration intake appropriate for improving, restoring or maintaining nutritional needs  Description: Monitor and assess patient's nutrition/hydration status for malnutrition  Collaborate with interdisciplinary team and initiate plan and interventions as ordered  Monitor patient's weight and dietary intake as ordered or per policy  Utilize nutrition screening tool and intervene as necessary  Determine patient's food preferences and provide high-protein, high-caloric foods as appropriate       INTERVENTIONS:  - Monitor oral intake, urinary output, labs, and treatment plans  - Assess nutrition and hydration status and recommend course of action  - Evaluate amount of meals eaten  - Assist patient with eating if necessary   - Allow adequate time for meals  - Recommend/ encourage appropriate diets, oral nutritional supplements, and vitamin/mineral supplements  - Order, calculate, and assess calorie counts as needed  - Recommend, monitor, and adjust tube feedings and TPN/PPN based on assessed needs  - Assess need for intravenous fluids  - Provide specific nutrition/hydration education as appropriate  - Include patient/family/caregiver in decisions related to nutrition  Outcome: Progressing     Problem: Alteration in Thoughts and Perception  Goal: Treatment Goal: Gain control of psychotic behaviors/thinking, reduce/eliminate presenting symptoms and demonstrate improved reality functioning upon discharge  Outcome: Progressing  Goal: Verbalize thoughts and feelings  Description: Interventions:  - Promote a nonjudgmental and trusting relationship with the patient through active listening and therapeutic communication  - Assess patient's level of functioning, behavior and potential for risk  - Engage patient in 1 on 1 interactions  - Encourage patient to express fears, feelings, frustrations, and discuss symptoms    - [place] patient to reality, help patient recognize reality-based thinking   - Administer medications as ordered and assess for potential side effects  - Provide the patient education related to the signs and symptoms of the illness and desired effects of prescribed medications  Outcome: Progressing  Goal: Refrain from acting on delusional thinking/internal stimuli  Description: Interventions:  - Monitor patient closely, per order   - Utilize least restrictive measures   - Set reasonable limits, give positive feedback for acceptable   - Administer medications as ordered and monitor of potential side effects  Outcome: Progressing  Goal: Agree to be compliant with medication regime, as prescribed and report medication side effects  Description: Interventions:  - Offer appropriate PRN medication and supervise ingestion; conduct AIMS, as needed   Outcome: Progressing  Goal: Attend and participate in unit activities, including therapeutic, recreational, and educational groups  Description: Interventions:  -Encourage Visitation and family involvement in care  Outcome: Progressing  Goal: Recognize dysfunctional thoughts, communicate reality-based thoughts at the time of discharge  Description: Interventions:  - Provide medication and psycho-education to assist patient in compliance and developing insight into his/her illness   Outcome: Progressing  Goal: Complete daily ADLs, including personal hygiene independently, as able  Description: Interventions:  - Observe, teach, and assist patient with ADLS  - Monitor and promote a balance of rest/activity, with adequate nutrition and elimination   Outcome: Progressing     Problem: Ineffective Coping  Goal: Cooperates with admission process  Description: Interventions:   - Complete admission process  Outcome: Progressing  Goal: Identifies ineffective coping skills  Outcome: Progressing  Goal: Identifies healthy coping skills  Outcome: Progressing  Goal: Demonstrates healthy coping skills  Outcome: Progressing  Goal: Patient/Family participate in treatment and DC plans  Description: Interventions:  - Provide therapeutic environment  Outcome: Progressing  Goal: Patient/Family verbalizes awareness of resources  Outcome: Progressing  Goal: Understands least restrictive measures  Description: Interventions:  - Utilize least restrictive behavior  Outcome: Progressing  Goal: Free from restraint events  Description: - Utilize least restrictive measures   - Provide behavioral interventions   - Redirect inappropriate behaviors   Outcome: Progressing     Problem: Risk for Self Injury/Neglect  Goal: Treatment Goal: Remain safe during length of stay, learn and adopt new coping skills, and be free of self-injurious ideation, impulses and acts at the time of discharge  Outcome: Progressing  Goal: Verbalize thoughts and feelings  Description: Interventions:  - Assess and re-assess patient's lethality and potential for self-injury  - Engage patient in 1:1 interactions, daily, for a minimum of 15 minutes  - Encourage patient to express feelings, fears, frustrations, hopes  - Establish rapport/trust with patient   Outcome: Progressing  Goal: Refrain from harming self  Description: Interventions:  - Monitor patient closely, per order  - Develop a trusting relationship  - Supervise medication ingestion, monitor effects and side effects   Outcome: Progressing  Goal: Attend and participate in unit activities, including therapeutic, recreational, and educational groups  Description: Interventions:  - Provide therapeutic and educational activities daily, encourage attendance and participation, and document same in the medical record  - Obtain collateral information, encourage visitation and family involvement in care   Outcome: Progressing  Goal: Recognize maladaptive responses and adopt new coping mechanisms  Outcome: Progressing  Goal: Complete daily ADLs, including personal hygiene independently, as able  Description: Interventions:  - Observe, teach, and assist patient with ADLS  - Monitor and promote a balance of rest/activity, with adequate nutrition and elimination  Outcome: Progressing     Problem: Risk for Violence/Aggression Toward Others  Goal: Treatment Goal: Refrain from acts of violence/aggression during length of stay, and demonstrate improved impulse control at the time of discharge  Outcome: Progressing  Goal: Verbalize thoughts and feelings  Description: Interventions:  - Assess and re-assess patient's level of risk, every waking shift  - Engage patient in 1:1 interactions, daily, for a minimum of 15 minutes   - Allow patient to express feelings and frustrations in a safe and non-threatening manner   - Establish rapport/trust with patient   Outcome: Progressing  Goal: Refrain from harming others  Outcome: Progressing  Goal: Refrain from destructive acts on the environment or property  Outcome: Progressing  Goal: Control angry outbursts  Description: Interventions:  - Monitor patient closely, per order  - Ensure early verbal de-escalation  - Monitor prn medication needs  - Set reasonable/therapeutic limits, outline behavioral expectations, and consequences   - Provide a non-threatening milieu, utilizing the least restrictive interventions   Outcome: Progressing  Goal: Attend and participate in unit activities, including therapeutic, recreational, and educational groups  Description: Interventions:  - Provide therapeutic and educational activities daily, encourage attendance and participation, and document same in the medical record   Outcome: Progressing  Goal: Identify appropriate positive anger management techniques  Description: Interventions:  - Offer anger management and coping skills groups   - Staff will provide positive feedback for appropriate anger control  Outcome: Progressing     Problem: Individualized Interventions  Goal: Patient will verbalize appropriate use of telephone within 5 days  Description: Interventions:  - Treatment team to determine use of supervised phone privileges   Outcome: Progressing  Goal: Patient will verbalize need for hospitalization and will no longer attempt elopement within 5 days  Description: Interventions:  - Ongoing education to help patient understand need for hospitalization  Outcome: Progressing  Goal: Patient will recognize inappropriate behaviors and develop alternative behaviors within 5 days  Description: Interventions:  - Patient in collaboration with Treatment Team will develop a behavior management plan to help identify effective coping skills to deal with stressors  Outcome: Progressing

## 2021-07-20 NOTE — PLAN OF CARE
Problem: Nutrition/Hydration-ADULT  Goal: Nutrient/Hydration intake appropriate for improving, restoring or maintaining nutritional needs  Description: Monitor and assess patient's nutrition/hydration status for malnutrition  Collaborate with interdisciplinary team and initiate plan and interventions as ordered  Monitor patient's weight and dietary intake as ordered or per policy  Utilize nutrition screening tool and intervene as necessary  Determine patient's food preferences and provide high-protein, high-caloric foods as appropriate       INTERVENTIONS:  - Monitor oral intake, urinary output, labs, and treatment plans  - Assess nutrition and hydration status and recommend course of action  - Evaluate amount of meals eaten  - Assist patient with eating if necessary   - Allow adequate time for meals  - Recommend/ encourage appropriate diets, oral nutritional supplements, and vitamin/mineral supplements  - Order, calculate, and assess calorie counts as needed  - Recommend, monitor, and adjust tube feedings and TPN/PPN based on assessed needs  - Assess need for intravenous fluids  - Provide specific nutrition/hydration education as appropriate  - Include patient/family/caregiver in decisions related to nutrition  Outcome: Progressing     Problem: Alteration in Thoughts and Perception  Goal: Verbalize thoughts and feelings  Description: Interventions:  - Promote a nonjudgmental and trusting relationship with the patient through active listening and therapeutic communication  - Assess patient's level of functioning, behavior and potential for risk  - Engage patient in 1 on 1 interactions  - Encourage patient to express fears, feelings, frustrations, and discuss symptoms    - [place] patient to reality, help patient recognize reality-based thinking   - Administer medications as ordered and assess for potential side effects  - Provide the patient education related to the signs and symptoms of the illness and desired effects of prescribed medications  Outcome: Progressing  Goal: Agree to be compliant with medication regime, as prescribed and report medication side effects  Description: Interventions:  - Offer appropriate PRN medication and supervise ingestion; conduct AIMS, as needed   Outcome: Progressing  Goal: Attend and participate in unit activities, including therapeutic, recreational, and educational groups  Description: Interventions:  -Encourage Visitation and family involvement in care  Outcome: Progressing     Problem: Ineffective Coping  Goal: Free from restraint events  Description: - Utilize least restrictive measures   - Provide behavioral interventions   - Redirect inappropriate behaviors   Outcome: Progressing     Problem: Risk for Self Injury/Neglect  Goal: Complete daily ADLs, including personal hygiene independently, as able  Description: Interventions:  - Observe, teach, and assist patient with ADLS  - Monitor and promote a balance of rest/activity, with adequate nutrition and elimination  Outcome: Progressing     Problem: Anxiety  Goal: Anxiety is at manageable level  Description: Interventions:  - Assess and monitor patient's anxiety level  - Monitor for signs and symptoms (heart palpitations, chest pain, shortness of breath, headaches, nausea, feeling jumpy, restlessness, irritable, apprehensive)  - Collaborate with interdisciplinary team and initiate plan and interventions as ordered    - [place] patient to unit/surroundings  - Explain treatment plan  - Encourage participation in care  - Encourage verbalization of concerns/fears  - Identify coping mechanisms  - Assist in developing anxiety-reducing skills  - Administer/offer alternative therapies  - Limit or eliminate stimulants  Outcome: Progressing

## 2021-07-20 NOTE — PROGRESS NOTES
07/20/21 1100   Activity/Group Checklist   Group   (Recovery Management/Health and Wellness)   Attendance Attended   Attendance Duration (min) 46-60   Interactions Interacted appropriately   Affect/Mood Appropriate;Bright;Calm;Normal range   Goals Achieved Identified feelings; Able to listen to others; Able to engage in interactions; Able to self-disclose

## 2021-07-20 NOTE — NURSING NOTE
Patient cooperative and with a blunted affect  She is pleasant in conversation  She denies all psych symptoms  She reports severe ear pain and is worried she has an ear infection and needs antibiotics  Medical was made aware and states they will see the patient

## 2021-07-20 NOTE — PROGRESS NOTES
07/20/21 1602   Team Meeting   Meeting Type Tx Team Meeting   Initial Conference Date 07/20/21   Next Conference Date 07/27/21   Team Members Present   Team Members Present Physician;Nurse;; Other (Discipline and Name)   Physician Team Member [place] Work Team Member Barry Pisano   Other (Discipline and Name) [place] CPS, Prakash CMP, [facility]       Summary: Patient presented for treatment team meeting today with a completed self assessment  Patient is calm and cooperative, adequately groomed  Pt shared from her assessment  She reported her biggest barrier as "concentration" and trying to focus in groups as her coping skill used  Pt has been attending groups, her goal this past week  Pt reported she'd like to focus this week on learning more from the group activities and journaling she is doing  She reported full list of medications, denied missed medications, denied reactions to meds and did report medical issues of her ankle pain  Pt also mentioned she'd like to get a mammogram, which SW will assist with to arrange for after her discharge  Pt reported good self care by hygiene, socializing, and relaxation  SW is awaiting a date for when [facility] team can officially open her for services  Will plan CSP and then discharge which we anticipate within next two weeks  Pt has not had any major outbursts or yelling episodes, reporting to team she has been feeling better and more controlled, using her coping skills and finding ways to better manage her symptoms before they become worse  Pt makes her needs known and finds her current medication regimen is working well  She stated she was hoping to be discharged this week but understands the delay and why

## 2021-07-20 NOTE — PROGRESS NOTES
Psychiatry Progress Note [facility] 49 y o  female MRN: [id number]  Unit/Bed#: [facility] 050-73 Encounter: [id number]  Code Status: Level 1 - Full Code    PCP: Naty Calvin DO    Date of Admission:  4/7/2021 1435   Date of Service:  07/20/21    Patient Active Problem List   Diagnosis    [facility], [facility]    Uncomplicated alcohol dependence ([facility] )    Suicidal behavior    LYNN (generalized anxiety disorder)    Slow transit constipation    Deficiency of vitamin B    Degenerative disc disease, lumbar    Post-traumatic stress disorder, chronic    Lower extremity weakness    Generalized abdominal pain    Non-intractable vomiting    Medical clearance for psychiatric admission    Carbuncle    Alcohol dependence with unspecified alcohol-induced disorder ([facility] )    Mild intermittent asthma without complication    Tobacco abuse    Ear problem, bilateral    Gastroesophageal reflux disease    Right foot pain    Ear problem, right    Injury of head    Acute sinusitis    Chlamydial cervicitis     Review of systems:    Patient did need Voltaren gel for foot pain,  Otherwise unremarkable  Diagnosis:          Schizoaffective bipolar, alcohol use disorder in early remission in controlled environment   Assessment   Overall Status:   No need for p r n  medications for yelling and screaming but still delusional but does not talk about it unless asked   Certification Statement: The patient will continue to require additional inpatient hospital stay due to ongoing mood swings paranoia   Acceptance by patient:  accepting    Hopefulness in recovery:  Living in a group home   Understanding of medications: has good understanding   Involved in reintegration process:  Adjusting to the unit   Trusting in relationship with psychiatrist:  trusting   Justification for dual anti-psychotics: due to lack of response to single antipsychotics   Side effects from treatment: none    Medication changes    none today  Medication Education; Risks and S/E and precautions of all meds given to patient and she did verbalize an understanding   Non-pharmacological treatments   Continue with individual, group, milieu and occupational therapy using recovery principles and psycho-education about accepting illness and the need for treatment  Safety   Safety and communication plan established to target dynamic risk factors discussed above  Discharge Plan     most likely to a group home with the act team again     Interval Progress      No p r n  medications were yelling and screaming for the last 3 days in a row since given Zyprexa for hearing voices last Friday did does not volunteer friendly talk about any delusions but appears to have some underlying paranoia about being hypnotized by others this, receiving messages from TV, people stalking her from upstairs and hearing voices through the vents on the ceiling and talks about them only when asked   Has been attending groups and is usually friendly and pleasant and is excited about discharge to the [facility] group home but she was accepted pending CSP meeting in  by the act team     Sleep:                                   good   appetite:                                good   compliance with medications :          good   Side  Effects:                                     None reported   significant events:                              No yelling screaming reported for last 3 days in a row    group attendance:                              Attending most of the groups 92% last week     Mental Status Exam  Appearance: age appropriate, casually dressed, looks older than stated age, overweight  Attended team meeting today without a mask and then did wear one when reminded   Behavior: normal, pleasant, cooperative, appears anxious, mildly anxious,  more friendly today and not confrontational      Pleasant, friendly, well groomed    Speech: fluent, clear, coherent, increased rate, pressured, hypertalkative,   Circumstantial pressured    Mood: depressed, anxious, euphoric  Affect: labile, reactive, slightly brighter    Thought Process: normal abstract reasoning, less prominent, tends to be pressured and circumstantial and preoccupied perseverating on discharge    Thought Content: some paranoia, ideas of reference, but does appear as if paranoid  No current suicidal homicidal thoughts intent or plans verbalized  No phobias obsessions compulsions or distorted body perceptions elicited    Also believes TV is sending her messages from [place] checking her if she is good or bad and threatening to get the feds to go after her    cointinues to think people may be doing hypnosis on her    Perceptual Disturbances: no auditory hallucinations, no visual hallucinations, denies auditory hallucinations when asked, does not appear responding to internal stimuli, no voices reported today   No voices reported today at all   Hx Risk Factors: chronic mood disorder, chronic psychotic symptoms, alcohol use, limted insight  Sensorium:          Oriented to 3 spheres and to situation   Cognition: recent and remote memory grossly intact  Consciousness: alert and awake  Attention: attention span and concentration are age appropriate  Intellect: appears to be of average intelligence  Insight: improving  Judgement: improving  Motor Activity: no abnormal movements     Vitals  Temp:  [98 °F (36 7 °C)] 98 °F (36 7 °C)  HR:  [84-97] 84  Resp:  [17-18] 17  BP: (106-116)/(63-76) 111/63  No intake or output data in the 24 hours ending 07/20/21 0589    Lab Results: No New Labs Available For Today     Current Facility-Administered Medications   Medication Dose Route Frequency Provider Last Rate    acetaminophen  650 mg Oral Q6H PRSHONA Rowe Ma, MD      acetaminophen  650 mg Oral Q4H GREGORIO Rowe Ma, MD      acetaminophen  975 mg Oral Q6H PRMD Yodit Cabello Ma al mag oxide-diphenhydramine-lidocaine viscous  10 mL Swish & Swallow Q4H PRN Edi Marques MD      albuterol  2 puff Inhalation Q6H PRN Edi Marques MD      aluminum-magnesium hydroxide-simethicone  15 mL Oral Q4H PRN Edi Marques MD      benztropine  1 mg Intramuscular Q4H PRN Max 6/day Edi Marques MD      benztropine  1 mg Oral Q4H PRN Max 6/day Edi Marques MD      benztropine  1 mg Oral BID Edi Marques MD      calcium carbonate  500 mg Oral TID PRN Edi Marques MD      Diclofenac Sodium  2 g Topical 4x Daily PRN Edi Marques MD      diphenhydrAMINE  25 mg Oral HS Edi Marques MD      fenofibrate  145 mg Oral Daily Edi Marques MD      gabapentin  600 mg Oral 4x Daily Edi Marques MD      haloperidol  1 mg Oral 4x Daily Edi Marques MD      haloperidol  5 mg Oral Q6H PRN Edi Marques MD      hydrOXYzine HCL  25 mg Oral Q6H PRN Max 4/day Edi Marques MD      hydrOXYzine HCL  50 mg Oral Q6H PRN Max 4/day Edi Marques MD      lidocaine  1 patch Topical Daily Edi Marques MD      LORazepam  1 mg Intramuscular Q6H PRN Edi Marques MD      LORazepam  0 5 mg Oral 4x Daily Edi Marques MD      magnesium hydroxide  30 mL Oral Daily PRN Edi Marques MD      methocarbamol  500 mg Oral Q8H PRN Edi Marques MD      nicotine  1 patch Transdermal Daily Edi Marques MD      nicotine polacrilex  4 mg Oral Q2H PRN Edi Marques MD      OLANZapine  2 5 mg Oral Q8H PRN Edi Marques MD      OLANZapine  5 mg Oral Q3H PRN Edi Marques MD      OLANZapine  7 5 mg Oral Q3H PRN Edi Marques MD      OLANZapine  10 mg Intramuscular Q3H PRN Edi Marques MD      OLANZapine  5 mg Intramuscular Q3H PRN Edi Marques MD      OLANZapine  10 mg Oral HS Edi Marques MD      OLANZapine  15 mg Oral Daily Edi Marques MD      paliperidone palmitate ER  234 mg Intramuscular Q30 Days Edi Marques MD      pantoprazole  40 mg Oral Early Morning Edi Marques MD      Pramox-PE-Glycerin-Petrolatum  1 application Rectal 4x Daily PRSHONA Philip MD      prazosin  1 mg Oral HS Mariel Philip MD      psyllium  1 packet Oral Daily Mariel Philip MD      sertraline  100 mg Oral Daily Mariel Philip MD         Counseling / Coordination of Care: Total floor / unit time spent today 15 minutes  Greater than 50% of total time was spent with the patient and / or family counseling and / or somewhat receptive to supportive listening and teaching positive coping skills to deal with symptom mangement  Patient's Rights, confidentiality and exceptions to confidentiality, use of automated medical record, [address] [place] [address] staff access to medical record, and consent to treatment reviewed  This note was  not shared with the patient because it might further aggravate her psychiatric condition  This note has been dictated

## 2021-07-21 PROBLEM — H66.90 OTITIS MEDIA: Status: ACTIVE | Noted: 2021-07-21

## 2021-07-21 PROCEDURE — 99232 SBSQ HOSP IP/OBS MODERATE 35: CPT | Performed by: PSYCHIATRY & NEUROLOGY

## 2021-07-21 RX ORDER — AMOXICILLIN AND CLAVULANATE POTASSIUM 875; 125 MG/1; MG/1
1 TABLET, FILM COATED ORAL EVERY 12 HOURS SCHEDULED
Status: DISCONTINUED | OUTPATIENT
Start: 2021-07-21 | End: 2021-07-26

## 2021-07-21 RX ADMIN — DIPHENHYDRAMINE HCL 25 MG: 25 TABLET ORAL at 21:12

## 2021-07-21 RX ADMIN — GABAPENTIN 600 MG: 300 CAPSULE ORAL at 11:57

## 2021-07-21 RX ADMIN — GABAPENTIN 600 MG: 300 CAPSULE ORAL at 17:02

## 2021-07-21 RX ADMIN — GABAPENTIN 600 MG: 300 CAPSULE ORAL at 08:24

## 2021-07-21 RX ADMIN — PSYLLIUM HUSK 1 PACKET: 3.4 POWDER ORAL at 08:25

## 2021-07-21 RX ADMIN — HALOPERIDOL 1 MG: 1 TABLET ORAL at 11:57

## 2021-07-21 RX ADMIN — BENZTROPINE MESYLATE 1 MG: 1 TABLET ORAL at 08:24

## 2021-07-21 RX ADMIN — LORAZEPAM 0.5 MG: 0.5 TABLET ORAL at 17:02

## 2021-07-21 RX ADMIN — BENZTROPINE MESYLATE 1 MG: 1 TABLET ORAL at 17:02

## 2021-07-21 RX ADMIN — LORAZEPAM 0.5 MG: 0.5 TABLET ORAL at 11:57

## 2021-07-21 RX ADMIN — ACETAMINOPHEN 975 MG: 325 TABLET ORAL at 20:26

## 2021-07-21 RX ADMIN — NICOTINE 1 PATCH: 21 PATCH, EXTENDED RELEASE TRANSDERMAL at 08:25

## 2021-07-21 RX ADMIN — SERTRALINE HYDROCHLORIDE 100 MG: 100 TABLET ORAL at 08:24

## 2021-07-21 RX ADMIN — DICLOFENAC SODIUM 2 G: 10 GEL TOPICAL at 21:15

## 2021-07-21 RX ADMIN — LIDOCAINE 1 PATCH: 50 PATCH CUTANEOUS at 08:25

## 2021-07-21 RX ADMIN — PHENYTOIN 1 MG: 125 SUSPENSION ORAL at 21:12

## 2021-07-21 RX ADMIN — OLANZAPINE 15 MG: 5 TABLET, FILM COATED ORAL at 08:24

## 2021-07-21 RX ADMIN — PANTOPRAZOLE SODIUM 40 MG: 40 TABLET, DELAYED RELEASE ORAL at 06:00

## 2021-07-21 RX ADMIN — HALOPERIDOL 1 MG: 1 TABLET ORAL at 21:12

## 2021-07-21 RX ADMIN — FENOFIBRATE 145 MG: 145 TABLET ORAL at 08:24

## 2021-07-21 RX ADMIN — HALOPERIDOL 1 MG: 1 TABLET ORAL at 17:02

## 2021-07-21 RX ADMIN — LORAZEPAM 0.5 MG: 0.5 TABLET ORAL at 08:24

## 2021-07-21 RX ADMIN — HALOPERIDOL 1 MG: 1 TABLET ORAL at 08:25

## 2021-07-21 RX ADMIN — AMOXICILLIN AND CLAVULANATE POTASSIUM 1 TABLET: 875; 125 TABLET, FILM COATED ORAL at 21:13

## 2021-07-21 RX ADMIN — GABAPENTIN 600 MG: 300 CAPSULE ORAL at 21:12

## 2021-07-21 RX ADMIN — AMOXICILLIN AND CLAVULANATE POTASSIUM 1 TABLET: 875; 125 TABLET, FILM COATED ORAL at 15:08

## 2021-07-21 RX ADMIN — OLANZAPINE 10 MG: 10 TABLET, FILM COATED ORAL at 21:12

## 2021-07-21 RX ADMIN — LORAZEPAM 0.5 MG: 0.5 TABLET ORAL at 21:12

## 2021-07-21 RX ADMIN — DICLOFENAC SODIUM 2 G: 10 GEL TOPICAL at 10:30

## 2021-07-21 NOTE — PLAN OF CARE
Problem: Nutrition/Hydration-ADULT  Goal: Nutrient/Hydration intake appropriate for improving, restoring or maintaining nutritional needs  Description: Monitor and assess patient's nutrition/hydration status for malnutrition  Collaborate with interdisciplinary team and initiate plan and interventions as ordered  Monitor patient's weight and dietary intake as ordered or per policy  Utilize nutrition screening tool and intervene as necessary  Determine patient's food preferences and provide high-protein, high-caloric foods as appropriate       INTERVENTIONS:  - Monitor oral intake, urinary output, labs, and treatment plans  - Assess nutrition and hydration status and recommend course of action  - Evaluate amount of meals eaten  - Assist patient with eating if necessary   - Allow adequate time for meals  - Recommend/ encourage appropriate diets, oral nutritional supplements, and vitamin/mineral supplements  - Order, calculate, and assess calorie counts as needed  - Recommend, monitor, and adjust tube feedings and TPN/PPN based on assessed needs  - Assess need for intravenous fluids  - Provide specific nutrition/hydration education as appropriate  - Include patient/family/caregiver in decisions related to nutrition  Outcome: Progressing     Problem: Alteration in Thoughts and Perception  Goal: Treatment Goal: Gain control of psychotic behaviors/thinking, reduce/eliminate presenting symptoms and demonstrate improved reality functioning upon discharge  Outcome: Progressing  Goal: Verbalize thoughts and feelings  Description: Interventions:  - Promote a nonjudgmental and trusting relationship with the patient through active listening and therapeutic communication  - Assess patient's level of functioning, behavior and potential for risk  - Engage patient in 1 on 1 interactions  - Encourage patient to express fears, feelings, frustrations, and discuss symptoms    - [place] patient to reality, help patient recognize reality-based thinking   - Administer medications as ordered and assess for potential side effects  - Provide the patient education related to the signs and symptoms of the illness and desired effects of prescribed medications  Outcome: Progressing  Goal: Refrain from acting on delusional thinking/internal stimuli  Description: Interventions:  - Monitor patient closely, per order   - Utilize least restrictive measures   - Set reasonable limits, give positive feedback for acceptable   - Administer medications as ordered and monitor of potential side effects  Outcome: Progressing  Goal: Agree to be compliant with medication regime, as prescribed and report medication side effects  Description: Interventions:  - Offer appropriate PRN medication and supervise ingestion; conduct AIMS, as needed   Outcome: Progressing  Goal: Attend and participate in unit activities, including therapeutic, recreational, and educational groups  Description: Interventions:  -Encourage Visitation and family involvement in care  Outcome: Progressing  Goal: Recognize dysfunctional thoughts, communicate reality-based thoughts at the time of discharge  Description: Interventions:  - Provide medication and psycho-education to assist patient in compliance and developing insight into his/her illness   Outcome: Progressing  Goal: Complete daily ADLs, including personal hygiene independently, as able  Description: Interventions:  - Observe, teach, and assist patient with ADLS  - Monitor and promote a balance of rest/activity, with adequate nutrition and elimination   Outcome: Progressing     Problem: Ineffective Coping  Goal: Cooperates with admission process  Description: Interventions:   - Complete admission process  Outcome: Progressing  Goal: Identifies ineffective coping skills  Outcome: Progressing  Goal: Identifies healthy coping skills  Outcome: Progressing  Goal: Demonstrates healthy coping skills  Outcome: Progressing  Goal: Participates in unit activities  Description: Interventions:  - Provide therapeutic environment   - Provide required programming   - Redirect inappropriate behaviors   Outcome: Progressing  Goal: Patient/Family participate in treatment and DC plans  Description: Interventions:  - Provide therapeutic environment  Outcome: Progressing  Goal: Patient/Family verbalizes awareness of resources  Outcome: Progressing  Goal: Understands least restrictive measures  Description: Interventions:  - Utilize least restrictive behavior  Outcome: Progressing  Goal: Free from restraint events  Description: - Utilize least restrictive measures   - Provide behavioral interventions   - Redirect inappropriate behaviors   Outcome: Progressing     Problem: Risk for Self Injury/Neglect  Goal: Treatment Goal: Remain safe during length of stay, learn and adopt new coping skills, and be free of self-injurious ideation, impulses and acts at the time of discharge  Outcome: Progressing  Goal: Verbalize thoughts and feelings  Description: Interventions:  - Assess and re-assess patient's lethality and potential for self-injury  - Engage patient in 1:1 interactions, daily, for a minimum of 15 minutes  - Encourage patient to express feelings, fears, frustrations, hopes  - Establish rapport/trust with patient   Outcome: Progressing  Goal: Refrain from harming self  Description: Interventions:  - Monitor patient closely, per order  - Develop a trusting relationship  - Supervise medication ingestion, monitor effects and side effects   Outcome: Progressing  Goal: Attend and participate in unit activities, including therapeutic, recreational, and educational groups  Description: Interventions:  - Provide therapeutic and educational activities daily, encourage attendance and participation, and document same in the medical record  - Obtain collateral information, encourage visitation and family involvement in care   Outcome: Progressing  Goal: Recognize maladaptive responses and adopt new coping mechanisms  Outcome: Progressing  Goal: Complete daily ADLs, including personal hygiene independently, as able  Description: Interventions:  - Observe, teach, and assist patient with ADLS  - Monitor and promote a balance of rest/activity, with adequate nutrition and elimination  Outcome: Progressing     Problem: Depression  Goal: Treatment Goal: Demonstrate behavioral control of depressive symptoms, verbalize feelings of improved mood/affect, and adopt new coping skills prior to discharge  Outcome: Progressing  Goal: Verbalize thoughts and feelings  Description: Interventions:  - Assess and re-assess patient's level of risk   - Engage patient in 1:1 interactions, daily, for a minimum of 15 minutes   - Encourage patient to express feelings, fears, frustrations, hopes   Outcome: Progressing  Goal: Refrain from harming self  Description: Interventions:  - Monitor patient closely, per order   - Supervise medication ingestion, monitor effects and side effects   Outcome: Progressing  Goal: Refrain from isolation  Description: Interventions:  - Develop a trusting relationship   - Encourage socialization   Outcome: Progressing  Goal: Refrain from self-neglect  Outcome: Progressing  Goal: Attend and participate in unit activities, including therapeutic, recreational, and educational groups  Description: Interventions:  - Provide therapeutic and educational activities daily, encourage attendance and participation, and document same in the medical record   Outcome: Progressing  Goal: Complete daily ADLs, including personal hygiene independently, as able  Description: Interventions:  - Observe, teach, and assist patient with ADLS  -  Monitor and promote a balance of rest/activity, with adequate nutrition and elimination   Outcome: Progressing     Problem: Anxiety  Goal: Anxiety is at manageable level  Description: Interventions:  - Assess and monitor patient's anxiety level     - Monitor for signs and symptoms (heart palpitations, chest pain, shortness of breath, headaches, nausea, feeling jumpy, restlessness, irritable, apprehensive)  - Collaborate with interdisciplinary team and initiate plan and interventions as ordered    - [place] patient to unit/surroundings  - Explain treatment plan  - Encourage participation in care  - Encourage verbalization of concerns/fears  - Identify coping mechanisms  - Assist in developing anxiety-reducing skills  - Administer/offer alternative therapies  - Limit or eliminate stimulants  Outcome: Progressing     Problem: Risk for Violence/Aggression Toward Others  Goal: Treatment Goal: Refrain from acts of violence/aggression during length of stay, and demonstrate improved impulse control at the time of discharge  Outcome: Progressing  Goal: Verbalize thoughts and feelings  Description: Interventions:  - Assess and re-assess patient's level of risk, every waking shift  - Engage patient in 1:1 interactions, daily, for a minimum of 15 minutes   - Allow patient to express feelings and frustrations in a safe and non-threatening manner   - Establish rapport/trust with patient   Outcome: Progressing  Goal: Refrain from harming others  Outcome: Progressing  Goal: Refrain from destructive acts on the environment or property  Outcome: Progressing  Goal: Control angry outbursts  Description: Interventions:  - Monitor patient closely, per order  - Ensure early verbal de-escalation  - Monitor prn medication needs  - Set reasonable/therapeutic limits, outline behavioral expectations, and consequences   - Provide a non-threatening milieu, utilizing the least restrictive interventions   Outcome: Progressing  Goal: Attend and participate in unit activities, including therapeutic, recreational, and educational groups  Description: Interventions:  - Provide therapeutic and educational activities daily, encourage attendance and participation, and document same in the medical record   Outcome: Progressing  Goal: Identify appropriate positive anger management techniques  Description: Interventions:  - Offer anger management and coping skills groups   - Staff will provide positive feedback for appropriate anger control  Outcome: Progressing     Problem: Alteration in Orientation  Goal: Treatment Goal: Demonstrate a reduction of confusion and improved orientation to person, place, time and/or situation upon discharge, according to optimum baseline/ability  Outcome: Progressing  Goal: Interact with staff daily  Description: Interventions:  - Assess and re-assess patient's level of orientation  - Engage patient in 1 on 1 interactions, daily, for a minimum of 15 minutes   - Establish rapport/trust with patient   Outcome: Progressing  Goal: Express concerns related to confused thinking related to:  Description: Interventions:  - Encourage patient to express feelings, fears, frustrations, hopes  - Assign consistent caregivers   - [place]/re-orient patient as needed  - Allow comfort items, as appropriate  - Provide visual cues, signs, etc    Outcome: Progressing  Goal: Allow medical examinations, as recommended  Description: Interventions:  - Provide physical/neurological exams and/or referrals, per provider   Outcome: Progressing  Goal: Cooperate with recommended testing/procedures  Description: Interventions:  - Determine need for ancillary testing  - Observe for mental status changes  - Implement falls/precaution protocol   Outcome: Progressing  Goal: Attend and participate in unit activities, including therapeutic, recreational, and educational groups  Description: Interventions:  - Provide therapeutic and educational activities daily, encourage attendance and participation, and document same in the medical record   - Provide appropriate opportunities for reminiscence   - Provide a consistent daily routine   - Encourage family contact/visitation   Outcome: Progressing  Goal: Complete daily ADLs, including personal hygiene independently, as able  Description: Interventions:  - Observe, teach, and assist patient with ADLS  Outcome: Progressing     Problem: Individualized Interventions  Goal: Patient will verbalize appropriate use of telephone within 5 days  Description: Interventions:  - Treatment team to determine use of supervised phone privileges   Outcome: Progressing  Goal: Patient will verbalize need for hospitalization and will no longer attempt elopement within 5 days  Description: Interventions:  - Ongoing education to help patient understand need for hospitalization  Outcome: Progressing  Goal: Patient will recognize inappropriate behaviors and develop alternative behaviors within 5 days  Description: Interventions:  - Patient in collaboration with Treatment Team will develop a behavior management plan to help identify effective coping skills to deal with stressors  Outcome: Progressing     Problem: DISCHARGE PLANNING - CARE MANAGEMENT  Goal: Discharge to post-acute care or home with appropriate resources  Description: INTERVENTIONS:  - Conduct assessment to determine patient/family and health care team treatment goals, and need for post-acute services based on payer coverage, community resources, and patient preferences, and barriers to discharge  - Address psychosocial, clinical, and financial barriers to discharge as identified in assessment in conjunction with the patient/family and health care team  - Arrange appropriate level of post-acute services according to patients   needs and preference and payer coverage in collaboration with the physician and health care team  - Communicate with and update the patient/family, physician, and health care team regarding progress on the discharge plan  - Arrange appropriate transportation to post-acute venues  Outcome: Progressing

## 2021-07-21 NOTE — PROGRESS NOTES
07/21/21 1100   Activity/Group Checklist   Group Wellness   Attendance Attended   Attendance Duration (min) 46-60   Interactions Interacted appropriately   Affect/Mood Appropriate;Calm;Normal range   Goals Achieved Identified feelings; Able to listen to others; Able to engage in interactions; Able to self-disclose

## 2021-07-21 NOTE — SOCIAL WORK
Patient approached SW this morning asking if we will be meeting for therapy today  SW confirmed this is the case, patient stated "good, I really need it   " She also mentioned some items she is still waiting on from ordering online  Patient is looking forward to discharge but has demonstrated tolerance in waiting, using her time to still invest in her recovery and develop coping skills  Patient does appear anxious this morning but has not had any recent reported outbursts

## 2021-07-21 NOTE — PROGRESS NOTES
Psychiatry Progress Note [facility] 49 y o  female MRN: [id number]  Unit/Bed#: AMBER LOU [facility] 978-60 Encounter: [id number]  Code Status: Level 1 - Full Code    PCP: John Balderas DO    Date of Admission:  4/7/2021 1435   Date of Service:  07/21/21    Patient Active Problem List   Diagnosis    Schizoaffective disorder, bipolar type ([facility] )    Uncomplicated alcohol dependence ([facility] )    Suicidal behavior    LYNN (generalized anxiety disorder)    Slow transit constipation    Deficiency of vitamin B    Degenerative disc disease, lumbar    Post-traumatic stress disorder, chronic    Lower extremity weakness    Generalized abdominal pain    Non-intractable vomiting    Medical clearance for psychiatric admission    Carbuncle    Alcohol dependence with unspecified alcohol-induced disorder ([facility] )    Mild intermittent asthma without complication    Tobacco abuse    Ear problem, bilateral    Gastroesophageal reflux disease    Right foot pain    Ear problem, right    Injury of head    Acute sinusitis    Chlamydial cervicitis     Review of systems:   Unremarkable today  Diagnosis:           Schizoaffective bipolar, alcohol use disorder in early remission in controlled environment  Assessment   Overall Status:   Was complaining of hearing voices through the went yesterday afternoon lawn and was looking for p r n  then use coping skills and did not need them,  No longer endorsing thoughts about receiving messages from TV or people doing hypnosis or stalking her lately    Remains friendly pleasant cooperative and attending groups   Certification Statement: The patient will continue to require additional inpatient hospital stay due to ongoing mood swings paranoia   Acceptance by patient:  accepting    Hopefulness in recovery:  Living in a group home   Understanding of medications: has good understanding   Involved in reintegration process:  Adjusting to the unit   Trusting in relationship with psychiatrist:  trusting   Justification for dual anti-psychotics: due to lack of response to single antipsychotics   Side effects from treatment: none    Medication changes    none today  Medication Education; Risks and S/E and precautions of all meds given to patient and she did verbalize an understanding   Non-pharmacological treatments   Continue with individual, group, milieu and occupational therapy using recovery principles and psycho-education about accepting illness and the need for treatment  Safety   Safety and communication plan established to target dynamic risk factors discussed above  Discharge Plan     most likely to a group home with the act team again     Interval Progress       Was admitting to hearing voices yesterday afternoon and was asking for p r n  then changed her mind and did use coping skills which was effective without any p r n  meds  She thought that she was hearing voices from the vents on the ceiling  But that went away  No longer believes that she is receiving messages from TV or people are doing hypnosis or people are stalking her which is a big improvement    She is anxious still looking forward to being picked up by the act team and a CSP meeting held before her prior discharge to [facility] home that has already accepted her     Sleep:                                    good   appetite:                                 good   compliance with medications :          good   Side  Effects:                                      None reported   significant events:                               No screaming yelling or the lab as a few days except for brief episode of yelling yesterday with hearing voices that lasted briefly   group attendance:                             Attending most of the groups    Mental Status Exam  Appearance: age appropriate, casually dressed, looks older than stated age, overweight  Friendly pleasant found in the hallway  Behavior: normal, pleasant, cooperative, appears anxious, mildly anxious,  more friendly today and not confrontational  Well groomed friendly and pleasant   Speech: fluent, clear, coherent, increased rate, pressured, hypertalkative,   Circumstantial pressured    Mood: depressed, anxious, euphoric  Affect: labile, reactive, slightly brighter    Thought Process: normal abstract reasoning, less prominent, tends to be pressured and circumstantial and preoccupied perseverating on discharge    Thought Content: some paranoia, ideas of reference, but does appear as if paranoid  No current suicidal homicidal thoughts intent or plans verbalized  No phobias obsessions compulsions or distorted body perceptions elicited     No longer believes dB sending messages or people are stalking her or the feds are after her or people doing hypnosis on her today   Perceptual Disturbances: no auditory hallucinations, no visual hallucinations, denies auditory hallucinations when asked, does not appear responding to internal stimuli, no voices reported today    No voices reported lately  Hx Risk Factors: chronic mood disorder, chronic psychotic symptoms, alcohol use, limted insight  Sensorium:           Oriented to 3 spheres and to  situation  Cognition: recent and remote memory grossly intact  Consciousness: alert and awake  Attention: attention span and concentration are age appropriate  Intellect: appears to be of average intelligence  Insight: improving  Judgement: improving  Motor Activity: no abnormal movements     Vitals  Temp:  [97 6 °F (36 4 °C)-99 °F (37 2 °C)] 99 °F (37 2 °C)  HR:  [81-98] 98  Resp:  [16-18] 16  BP: (109-127)/(60-70) 127/70  No intake or output data in the 24 hours ending 07/21/21 0969    Lab Results: No New Labs Available For Today     Current Facility-Administered Medications   Medication Dose Route Frequency Provider Last Rate    acetaminophen  650 mg Oral Q6H PRN Ranjeet Castrejon MD      acetaminophen 650 [facility] Oral Q4H PRN [facility]      acetaminophen  975 mg Oral Q6H PRN Troy Said, MD      al mag oxide-diphenhydramine-lidocaine viscous  10 mL Swish & Swallow Q4H PRN Troy Said, MD      albuterol  2 puff Inhalation Q6H PRN Troy Said, MD      aluminum-magnesium hydroxide-simethicone  15 mL Oral Q4H PRN Troy Said, MD      benztropine  1 mg Intramuscular Q4H PRN Max 6/day Troy Said, MD      benztropine  1 mg Oral Q4H PRN Max 6/day Troy Said, MD      benztropine  1 mg Oral BID Troy Said, MD      calcium carbonate  500 mg Oral TID PRN Troy Said, MD      Diclofenac Sodium  2 g Topical 4x Daily PRN [facility]      diphenhydrAMINE  25 mg Oral HS Troy Said, MD      fenofibrate  145 mg Oral Daily Troy Said, MD      gabapentin  600 mg Oral 4x Daily Troy Said, MD      haloperidol  1 mg Oral 4x Daily Troy Said, MD      haloperidol  5 mg Oral Q6H PRN [facility]      hydrOXYzine HCL  25 mg Oral Q6H PRN Max 4/day Troy Said, MD      hydrOXYzine HCL  50 mg Oral Q6H PRN Max 4/day Troy Said, MD      lidocaine  1 patch Topical Daily [facility]      LORazepam  1 mg Intramuscular Q6H PRN [facility], MD      LORazepam  0 5 mg Oral 4x Daily Troy Said, MD      magnesium hydroxide  30 mL Oral Daily PRN [facility], [facility]      methocarbamol  500 mg Oral Q8H PRN [facility], MD      nicotine  1 patch Transdermal Daily Troy Said, MD      nicotine polacrilex  4 mg Oral Q2H PRN [facility], [facility]      OLANZapine  2 5 mg Oral Q8H PRN [facility], MD      OLANZapine  5 mg Oral Q3H PRN [facility], MD      OLANZapine  7 5 mg Oral Q3H PRN [facility], MD      OLANZapine  10 mg Intramuscular Q3H PRN [facility], MD      OLANZapine  5 mg Intramuscular Q3H PRN [facility] Simpson MD      OLANZapine  10 mg Oral HS Troy Simpson MD      OLANZapine  15 mg Oral Daily Troy Simpson MD      paliperidone palmitate ER  234 mg Intramuscular Q30 Days Troy Simpson MD      pantoprazole  40 mg Oral Early Morning Ana Marte MD      Pramox-PE-Glycerin-Petrolatum  1 application Rectal 4x Daily PRN Ana Marte MD      prazosin  1 mg Oral HS Ana Marte MD      psyllium  1 packet Oral Daily Ana Marte MD      sertraline  100 mg Oral Daily Ana Marte MD         Counseling / Coordination of Care: Total floor / unit time spent today 15 minutes  Greater than 50% of total time was spent with the patient and / or family counseling and / or somewhat receptive to supportive listening and teaching positive coping skills to deal with symptom mangement  Patient's Rights, confidentiality and exceptions to confidentiality, use of automated medical record, [facility] [place] includes the [facility] staff access to medical record, and consent to treatment reviewed  This note was  not shared with the patient because it might further aggravate her psychiatric condition  This note has been dictated

## 2021-07-21 NOTE — PROGRESS NOTES
[facility]  Progress Note Karma Escamilla 1970, 49 y o  female MRN: [id number]  Unit/Bed#: AMBER LOU [place] [address] Encounter: [id number]  Primary Care Provider: Lefty Birch DO   Date and time admitted to hospital: 2021  2:35 PM    Otitis media  Assessment & Plan  · Complaining of right ear pain, states ear feels swollen  · On physical exam, tm is erythematous  · Order Augmentin 875 bid x 7 days      Nurse Coordination of Care Discussion: Discussed Assessment and plan with YUDY Mariee    Discussions with Specialists or Other Care Team Provider: No    Education and Discussions with Family / Patient: Answered patients questions to the best of my ability    Time Spent for Care: 30 minutes  More than 50% of total time spent on counseling and coordination of care as described above  Current Length of Stay: 105 day(s)    Code Status: Level 1 - Full Code      Subjective:   Patient complaining of pain in right ear, states her ear has felt "off" for months  Ear "pain" present for the past week  States her ear feels "swollen" inside  Denies fever, chills, sore throat, SOB, cough  Objective:     Vitals:   Temp (24hrs), Av 3 °F (36 8 °C), Min:97 6 °F (36 4 °C), Max:99 °F (37 2 °C)    Temp:  [97 6 °F (36 4 °C)-99 °F (37 2 °C)] 99 °F (37 2 °C)  HR:  [81-98] 98  Resp:  [16-18] 16  BP: (109-127)/(60-70) 127/70  Body mass index is 27 28 kg/m²  Physical Exam:     Physical Exam  Constitutional:       Appearance: Normal appearance  HENT:      Head: Normocephalic  Right Ear: External ear normal       Left Ear: Tympanic membrane, ear canal and external ear normal       Ears:     Skin:     General: Skin is warm and dry  Neurological:      Mental Status: She is alert  Psychiatric:         Mood and Affect: Mood normal          Additional Data:     Labs:                            * I Have Reviewed All Lab Data Listed Above  * Additional Pertinent Lab Tests Reviewed:  All Labs Within Last 24 Hours Reviewed    Imaging:    Imaging Reports Reviewed Today Include: No imaging reviewed today    Last 24 Hours Medication List:   Current Facility-Administered Medications   Medication Dose Route Frequency Provider Last Rate    acetaminophen  650 mg Oral Q6H PRN Lucille Maher MD      acetaminophen  650 mg Oral Q4H PRN Lucille Maher MD      acetaminophen  975 mg Oral Q6H PRN Lucille Maher MD      al mag oxide-diphenhydramine-lidocaine viscous  10 mL Swish & Swallow Q4H PRN Lucille Maher MD      albuterol  2 puff Inhalation Q6H PRN Lucille Maher MD      aluminum-magnesium hydroxide-simethicone  15 mL Oral Q4H PRN Lucille Maher MD      benztropine  1 mg Intramuscular Q4H PRN Max 6/day Lucille Maher MD      benztropine  1 mg Oral Q4H PRN Max 6/day Lucille Maher MD      benztropine  1 mg Oral BID Lucille Maher MD      calcium carbonate  500 mg Oral TID PRN Lucille Maher MD      Diclofenac Sodium  2 g Topical 4x Daily PRN Lucille Maher MD      diphenhydrAMINE  25 mg Oral HS Lucille Maher MD      fenofibrate  145 mg Oral Daily Lucille Maher MD      gabapentin  600 mg Oral 4x Daily Lucille Maher MD      haloperidol  1 mg Oral 4x Daily Lucille Maher MD      haloperidol  5 mg Oral Q6H PRN Lucille Maher MD      hydrOXYzine HCL  25 mg Oral Q6H PRN Max 4/day Lucille Maher MD      hydrOXYzine HCL  50 mg Oral Q6H PRN Max 4/day Lucille Maher MD      lidocaine  1 patch Topical Daily Lucille Maher MD      LORazepam  1 mg Intramuscular Q6H PRN Lucille Maher MD      LORazepam  0 5 mg Oral 4x Daily Lucille Maher MD      magnesium hydroxide  30 mL Oral Daily PRN Lucille Maher MD      methocarbamol  500 mg Oral Q8H PRN Lucille Maher MD      nicotine  1 patch Transdermal Daily Lucille Maher MD      nicotine polacrilex  4 mg Oral Q2H PRN Lucille Maher MD      OLANZapine  2 5 mg Oral Q8H PRN Lucille Maher MD      OLANZapine  5 mg Oral Q3H PRN Lucille Maher MD      OLANZapine  7 5 mg Oral Q3H PRN MD Lindsay Medellin OLANZapine  10 mg Intramuscular Q3H PRN Antonia Turner MD      OLANZapine  5 mg Intramuscular Q3H PRN Antonia Turner MD      OLANZapine  10 mg Oral HS Antonia Turner MD      OLANZapine  15 mg Oral Daily Antonia Turner MD      paliperidone palmitate ER  234 mg Intramuscular Q30 Days Antonia Turner MD      pantoprazole  40 mg Oral Early Morning Antonia Turner MD      Pramox-PE-Glycerin-Petrolatum  1 application Rectal 4x Daily PRN Antonia Turner MD      prazosin  1 mg Oral HS Antonia Turner MD      psyllium  1 packet Oral Daily Antonia Turner MD      sertraline  100 mg Oral Daily Antonia Turner MD          Today, Patient Was Seen By: Jensen Ramon PA-C      ** Please Note: Dictation voice to text software may have been used in the creation of this document   **

## 2021-07-21 NOTE — NURSING NOTE
Pt visible on the unit  Arguing with peer regarding use of phone  Phone times instituted and pt states understanding  Takes prescribed medications as ordered in addition to Voltaren gel  Out for meals, eats well

## 2021-07-21 NOTE — ASSESSMENT & PLAN NOTE
· Complaining of right ear pain, states ear feels swollen  · On physical exam, tm is erythematous  · Order Augmentin 875 bid x 7 days

## 2021-07-21 NOTE — PROGRESS NOTES
07/21/21 1200   Team Meeting   Meeting Type Daily Rounds   Initial Conference Date 07/21/21   Patient/Family Present   Patient Present No   Patient's Family Present No         Daily Rounds Documentation  Team Members Participating  MD Anurag Payton, RN  Edil Gupta, DECLANW  Shweta Burns, MARK ANTHONY Gomez, ANNALISE    Case reviewed  C/O ear pain  Medical will follow up  Had reported "voicecs are bothering me" and difficulty with poor focus  5/6 groups

## 2021-07-21 NOTE — PROGRESS NOTES
07/21/21 0900   Activity/Group Checklist   Group Community meeting   Attendance Did not attend   Attendance Duration (min) 16-30   Affect/Mood ASHLEY

## 2021-07-22 PROCEDURE — 99232 SBSQ HOSP IP/OBS MODERATE 35: CPT | Performed by: PSYCHIATRY & NEUROLOGY

## 2021-07-22 RX ADMIN — GABAPENTIN 600 MG: 300 CAPSULE ORAL at 12:35

## 2021-07-22 RX ADMIN — OLANZAPINE 15 MG: 5 TABLET, FILM COATED ORAL at 08:51

## 2021-07-22 RX ADMIN — HALOPERIDOL 1 MG: 1 TABLET ORAL at 21:07

## 2021-07-22 RX ADMIN — GABAPENTIN 600 MG: 300 CAPSULE ORAL at 17:16

## 2021-07-22 RX ADMIN — SERTRALINE HYDROCHLORIDE 100 MG: 100 TABLET ORAL at 08:51

## 2021-07-22 RX ADMIN — BENZTROPINE MESYLATE 1 MG: 1 TABLET ORAL at 17:16

## 2021-07-22 RX ADMIN — GABAPENTIN 600 MG: 300 CAPSULE ORAL at 08:51

## 2021-07-22 RX ADMIN — LORAZEPAM 0.5 MG: 0.5 TABLET ORAL at 17:16

## 2021-07-22 RX ADMIN — AMOXICILLIN AND CLAVULANATE POTASSIUM 1 TABLET: 875; 125 TABLET, FILM COATED ORAL at 21:06

## 2021-07-22 RX ADMIN — GABAPENTIN 600 MG: 300 CAPSULE ORAL at 21:07

## 2021-07-22 RX ADMIN — LORAZEPAM 0.5 MG: 0.5 TABLET ORAL at 08:51

## 2021-07-22 RX ADMIN — LORAZEPAM 0.5 MG: 0.5 TABLET ORAL at 12:35

## 2021-07-22 RX ADMIN — PANTOPRAZOLE SODIUM 40 MG: 40 TABLET, DELAYED RELEASE ORAL at 06:04

## 2021-07-22 RX ADMIN — METHOCARBAMOL TABLETS 500 MG: 500 TABLET, COATED ORAL at 14:03

## 2021-07-22 RX ADMIN — OLANZAPINE 10 MG: 10 TABLET, FILM COATED ORAL at 21:07

## 2021-07-22 RX ADMIN — ACETAMINOPHEN 975 MG: 325 TABLET ORAL at 10:12

## 2021-07-22 RX ADMIN — BENZTROPINE MESYLATE 1 MG: 1 TABLET ORAL at 08:51

## 2021-07-22 RX ADMIN — ACETAMINOPHEN 975 MG: 325 TABLET ORAL at 17:54

## 2021-07-22 RX ADMIN — HALOPERIDOL 1 MG: 1 TABLET ORAL at 12:35

## 2021-07-22 RX ADMIN — LIDOCAINE 1 PATCH: 50 PATCH CUTANEOUS at 09:00

## 2021-07-22 RX ADMIN — AMOXICILLIN AND CLAVULANATE POTASSIUM 1 TABLET: 875; 125 TABLET, FILM COATED ORAL at 08:52

## 2021-07-22 RX ADMIN — PHENYTOIN 1 MG: 125 SUSPENSION ORAL at 21:06

## 2021-07-22 RX ADMIN — DIPHENHYDRAMINE HCL 25 MG: 25 TABLET ORAL at 21:06

## 2021-07-22 RX ADMIN — PSYLLIUM HUSK 1 PACKET: 3.4 POWDER ORAL at 08:57

## 2021-07-22 RX ADMIN — NICOTINE 1 PATCH: 21 PATCH, EXTENDED RELEASE TRANSDERMAL at 09:00

## 2021-07-22 RX ADMIN — FENOFIBRATE 145 MG: 145 TABLET ORAL at 08:51

## 2021-07-22 RX ADMIN — LORAZEPAM 0.5 MG: 0.5 TABLET ORAL at 21:07

## 2021-07-22 RX ADMIN — HALOPERIDOL 1 MG: 1 TABLET ORAL at 08:51

## 2021-07-22 RX ADMIN — HALOPERIDOL 1 MG: 1 TABLET ORAL at 17:16

## 2021-07-22 NOTE — NURSING NOTE
Patient spent much of the early evening in bed  She c/o #9-10 right ear pain after dinner and received Tylenol 975 mg at 1800  She used the phone on her designated phone time  At 2100 she c/o "not feeling well" T-97 7  She got up and had a snack then took her Augmentin for ear infection  She was cooperative and pleasant   No episodes of yelling; no behavioral issues

## 2021-07-22 NOTE — NURSING NOTE
Patient is spending a lot of time in her bed this evening  When visible she is calm and pleasant  She c/o  R ear pain and received Tylenol 975 mg at 1800 which reduced pain effectively within the hour  She initially stated she "didn't feel well at 2030 but her temp was 97 7 and she got OOB for snack and had a little extra food r/t getting the Augmentin which can cause gastric distress   She then took her HS meds with the Augmentin at 2100 without incident

## 2021-07-22 NOTE — NURSING NOTE
Visible most of shift, c/o R ear pain and prn tylenol given   Attended group and had snack   No outbursts or behaviors noted

## 2021-07-22 NOTE — NURSING NOTE
Received pt asleep in bed, chest movement noted  Q 7 minute checks maintained  Will continue to monitor

## 2021-07-22 NOTE — PROGRESS NOTES
Psychiatry Progress Note [facility] 49 y o  female MRN: [id number]  Unit/Bed#: [facility]BER LOU [facility] 192-75 Encounter: [id number]  Code Status: Level 1 - Full Code    PCP: Naty Calvin DO    Date of Admission:  4/7/2021 1435   Date of Service:  07/22/21    Patient Active Problem List   Diagnosis    Schizoaffective disorder, bipolar type ([facility] )    Uncomplicated alcohol dependence ([facility] )    Suicidal behavior    LYNN (generalized anxiety disorder)    Slow transit constipation    Deficiency of vitamin B    Degenerative disc disease, lumbar    Post-traumatic stress disorder, chronic    Lower extremity weakness    Generalized abdominal pain    Non-intractable vomiting    Medical clearance for psychiatric admission    Carbuncle    Alcohol dependence with unspecified alcohol-induced disorder ([facility] )    Mild intermittent asthma without complication    Tobacco abuse    Ear problem, bilateral    Gastroesophageal reflux disease    Right foot pain    Ear problem, right    Injury of head    Acute sinusitis    Chlamydial cervicitis    Otitis media     Review of systems:    Seen by medical for earache and started on Augmentin  Diagnosis:           Schizoaffective bipolar, alcohol use disorder in early remission in controlled environment  Assessment   Overall Status:    No verbal outbursts and no complaints of voices yesterday and not voicing any delusional believe today   Certification Statement: The patient will continue to require additional inpatient hospital stay due to ongoing mood swings paranoia   Acceptance by patient:  accepting    Hopefulness in recovery:  Living in a group home   Understanding of medications: has good understanding   Involved in reintegration process:  Adjusting to the unit   Trusting in relationship with psychiatrist:  trusting   Justification for dual anti-psychotics: due to lack of response to single antipsychotics   Side effects from treatment: none    Medication changes    none today  Medication Education; Risks and S/E and precautions of all meds given to patient and she did verbalize an understanding   Non-pharmacological treatments   Continue with individual, group, milieu and occupational therapy using recovery principles and psycho-education about accepting illness and the need for treatment  Safety   Safety and communication plan established to target dynamic risk factors discussed above  Discharge Plan     most likely to a group home with the act team again     Interval Progress      Was seen by medical for earache and started on Augmentin did using her coping skills and did not complain about hearing voices and did not need any p r n  meds in the last 24 hours for voices  No screaming episodes reported  Has not voiced any delusional believes about people doing hypnosis or people stalking her or receiving messages from TV    She is excited about possible discharge next week or so once picked up by the act team  To be living at the [facility] group home where she has already accepted     Sleep:                                     Good   appetite:                                    Good   compliance with medications :           Good   Side  Effects:                                       None reported   significant events:                                Improving with no screaming   group attendance:                                Attending most of the groups    Mental Status Exam  Appearance: age appropriate, casually dressed, looks older than stated age, overweight  Friendly and pleasant, found in her room  Behavior: normal, pleasant, cooperative, appears anxious, mildly anxious,  more friendly today and not confrontational   Well groomed well kept relating well   Speech: fluent, clear, coherent, increased rate, pressured, hypertalkative,   Circumstantial pressured    Mood: depressed, anxious, euphoric  Affect: labile, reactive, slightly brighter    Thought Process: normal abstract reasoning, less prominent, tends to be pressured and circumstantial and preoccupied perseverating on discharge    Thought Content: some paranoia, ideas of reference, but does appear as if paranoid  No current suicidal homicidal thoughts intent or plans verbalized  No phobias obsessions compulsions or distorted body perceptions elicited      Does not talk about TB sending messages or people stalking her or staff doing hypnosis on her today   Perceptual Disturbances: no auditory hallucinations, no visual hallucinations, denies auditory hallucinations when asked, does not appear responding to internal stimuli, no voices reported today     Does not admit to hearing voices today when asked  Hx Risk Factors: chronic mood disorder, chronic psychotic symptoms, alcohol use, limted insight  Sensorium:           Oriented to 3 spheres and to situation  Cognition: recent and remote memory grossly intact  Consciousness: alert and awake  Attention: attention span and concentration are age appropriate  Intellect: appears to be of average intelligence  Insight: improving  Judgement: improving  Motor Activity: no abnormal movements     Vitals  Temp:  [97 4 °F (36 3 °C)-99 °F (37 2 °C)] 97 4 °F (36 3 °C)  HR:  [83-98] 94  Resp:  [16-18] 18  BP: (125-135)/(70-89) 135/89  No intake or output data in the 24 hours ending 07/22/21 8173    Lab Results: No New Labs Available For Today     Current Facility-Administered Medications   Medication Dose Route Frequency Provider Last Rate    acetaminophen  650 mg Oral Q6H PRN Arcenio Bañuelos MD      acetaminophen  650 mg Oral Q4H PRSHONA Bañuelos MD      acetaminophen  975 mg Oral Q6H PRN Arcenio Bañuelos MD      al mag oxide-diphenhydramine-lidocaine viscous  10 mL Swish & Swallow Q4H PRSHONA Bañuelos MD      albuterol  2 puff Inhalation Q6H PRSHONA Bañuelos MD      aluminum-magnesium hydroxide-simethicone  15 mL Oral Q4H PRN MD Ignacia Uriarte amoxicillin-clavulanate  1 tablet Oral Q12H [address] [place] FuMARTINE      benztropine  1 mg Intramuscular Q4H PRN Max 6/day Xavier Tangirnaq, MD      benztropine  1 mg Oral Q4H PRN Max 6/day Xavier Tangirnaq, MD      benztropine  1 mg Oral BID Xavier Tangirnaq, MD      calcium carbonate  500 mg Oral TID PRN Xavier Tangirnaq, MD      Diclofenac Sodium  2 g Topical 4x Daily PRN Xavier Tangirnaq, MD      diphenhydrAMINE  25 mg Oral HS Xavier Tangirnaq, MD      fenofibrate  145 mg Oral Daily Xavier Tangirnaq, MD      gabapentin  600 mg Oral 4x Daily Xavier Tangirnaq, MD      haloperidol  1 mg Oral 4x Daily Xavier Tangirnaq, MD      haloperidol  5 mg Oral Q6H PRN Xavier Tangirnaq, MD      hydrOXYzine HCL  25 mg Oral Q6H PRN Max 4/day Xavier Tangirnaq, MD      hydrOXYzine HCL  50 mg Oral Q6H PRN Max 4/day Xavier Tangirnaq, MD      lidocaine  1 patch Topical Daily Xavier Tangirnaq, MD      LORazepam  1 mg Intramuscular Q6H PRN Xavier Tangirnaq, MD      LORazepam  0 5 mg Oral 4x Daily Xavier Tangirnaq, MD      magnesium hydroxide  30 mL Oral Daily PRN Xavier Tangirnaq, MD      methocarbamol  500 mg Oral Q8H PRN Xavier Tangirnaq, MD      nicotine  1 patch Transdermal Daily Xavier Tangirnaq, MD      nicotine polacrilex  4 mg Oral Q2H PRN Xavier Tangirnaq, MD      OLANZapine  2 5 mg Oral Q8H PRN Xavier Tangirnaq, MD      OLANZapine  5 mg Oral Q3H PRN Xavier Tangirnaq, MD      OLANZapine  7 5 mg Oral Q3H PRN Xavier Tangirnaq, MD      OLANZapine  10 mg Intramuscular Q3H PRN Xavier Tangirnaq, MD      OLANZapine  5 mg Intramuscular Q3H PRN Xavier Tangirnaq, MD      OLANZapine  10 mg Oral HS Xavier Tangirnaq, MD      OLANZapine  15 mg Oral Daily Xavier Tangirnaq, MD      paliperidone palmitate ER  234 mg Intramuscular Q30 Days Xavier Tangirnaq, MD      pantoprazole  40 mg Oral Early Morning Xavier Tangirnaq, MD      Pramox-PE-Glycerin-Petrolatum  1 application Rectal 4x Daily PRN Xavier Tangirnaq, MD      prazosin  1 mg Oral HS Xavier Tangirnaq, MD      psyllium  1 packet Oral Daily Xavier Tangirnaq, MD      sertraline  100 mg Oral Daily Vanessa Dixon Madison Stone MD         Counseling / Coordination of Care: Total floor / unit time spent today 15 minutes  Greater than 50% of total time was spent with the patient and / or family counseling and / or somewhat receptive to supportive listening and teaching positive coping skills to deal with symptom mangement  Patient's Rights, confidentiality and exceptions to confidentiality, use of automated medical record, Howie [facility] misbah staff access to medical record, and consent to treatment reviewed  This note was  not shared with the patient because it might further aggravate her psychiatric condition  This note has been dictated

## 2021-07-22 NOTE — SOCIAL WORK
Group e-mail sent to [employer] , [facility] Kostas, and [facility] to arrange for a time next week to have [facility] meeting  Proposing Tuesday, Thursday or Friday  Awaiting all parties to confirm availability prior to scheduling  Also await [facility]'s response as to when [facility] can officially open pt for services

## 2021-07-23 PROCEDURE — 99232 SBSQ HOSP IP/OBS MODERATE 35: CPT | Performed by: PSYCHIATRY & NEUROLOGY

## 2021-07-23 RX ADMIN — BENZTROPINE MESYLATE 1 MG: 1 TABLET ORAL at 08:20

## 2021-07-23 RX ADMIN — HALOPERIDOL 1 MG: 1 TABLET ORAL at 08:19

## 2021-07-23 RX ADMIN — FENOFIBRATE 145 MG: 145 TABLET ORAL at 08:19

## 2021-07-23 RX ADMIN — HALOPERIDOL 1 MG: 1 TABLET ORAL at 17:05

## 2021-07-23 RX ADMIN — AMOXICILLIN AND CLAVULANATE POTASSIUM 1 TABLET: 875; 125 TABLET, FILM COATED ORAL at 08:22

## 2021-07-23 RX ADMIN — BENZTROPINE MESYLATE 1 MG: 1 TABLET ORAL at 17:04

## 2021-07-23 RX ADMIN — NICOTINE 1 PATCH: 21 PATCH, EXTENDED RELEASE TRANSDERMAL at 08:18

## 2021-07-23 RX ADMIN — HALOPERIDOL 1 MG: 1 TABLET ORAL at 21:07

## 2021-07-23 RX ADMIN — METHOCARBAMOL TABLETS 500 MG: 500 TABLET, COATED ORAL at 21:12

## 2021-07-23 RX ADMIN — LIDOCAINE 1 PATCH: 50 PATCH CUTANEOUS at 08:18

## 2021-07-23 RX ADMIN — HALOPERIDOL 1 MG: 1 TABLET ORAL at 11:20

## 2021-07-23 RX ADMIN — LORAZEPAM 0.5 MG: 0.5 TABLET ORAL at 11:20

## 2021-07-23 RX ADMIN — LORAZEPAM 0.5 MG: 0.5 TABLET ORAL at 17:05

## 2021-07-23 RX ADMIN — SERTRALINE HYDROCHLORIDE 100 MG: 100 TABLET ORAL at 08:19

## 2021-07-23 RX ADMIN — GABAPENTIN 600 MG: 300 CAPSULE ORAL at 17:04

## 2021-07-23 RX ADMIN — PANTOPRAZOLE SODIUM 40 MG: 40 TABLET, DELAYED RELEASE ORAL at 06:19

## 2021-07-23 RX ADMIN — DICLOFENAC SODIUM 2 G: 10 GEL TOPICAL at 12:05

## 2021-07-23 RX ADMIN — LORAZEPAM 0.5 MG: 0.5 TABLET ORAL at 08:20

## 2021-07-23 RX ADMIN — GABAPENTIN 600 MG: 300 CAPSULE ORAL at 21:06

## 2021-07-23 RX ADMIN — PHENYTOIN 1 MG: 125 SUSPENSION ORAL at 21:06

## 2021-07-23 RX ADMIN — AMOXICILLIN AND CLAVULANATE POTASSIUM 1 TABLET: 875; 125 TABLET, FILM COATED ORAL at 21:06

## 2021-07-23 RX ADMIN — DIPHENHYDRAMINE HCL 25 MG: 25 TABLET ORAL at 21:06

## 2021-07-23 RX ADMIN — LORAZEPAM 0.5 MG: 0.5 TABLET ORAL at 21:06

## 2021-07-23 RX ADMIN — GABAPENTIN 600 MG: 300 CAPSULE ORAL at 08:19

## 2021-07-23 RX ADMIN — OLANZAPINE 10 MG: 10 TABLET, FILM COATED ORAL at 21:06

## 2021-07-23 RX ADMIN — PSYLLIUM HUSK 1 PACKET: 3.4 POWDER ORAL at 08:18

## 2021-07-23 RX ADMIN — ACETAMINOPHEN 975 MG: 325 TABLET ORAL at 12:04

## 2021-07-23 RX ADMIN — GABAPENTIN 600 MG: 300 CAPSULE ORAL at 11:19

## 2021-07-23 RX ADMIN — OLANZAPINE 15 MG: 5 TABLET, FILM COATED ORAL at 08:19

## 2021-07-23 NOTE — PLAN OF CARE
Problem: Alteration in Thoughts and Perception  Goal: Treatment Goal: Gain control of psychotic behaviors/thinking, reduce/eliminate presenting symptoms and demonstrate improved reality functioning upon discharge  Outcome: Progressing  Goal: Verbalize thoughts and feelings  Description: Interventions:  - Promote a nonjudgmental and trusting relationship with the patient through active listening and therapeutic communication  - Assess patient's level of functioning, behavior and potential for risk  - Engage patient in 1 on 1 interactions  - Encourage patient to express fears, feelings, frustrations, and discuss symptoms    - [place] patient to reality, help patient recognize reality-based thinking   - Administer medications as ordered and assess for potential side effects  - Provide the patient education related to the signs and symptoms of the illness and desired effects of prescribed medications  Outcome: Progressing  Goal: Refrain from acting on delusional thinking/internal stimuli  Description: Interventions:  - Monitor patient closely, per order   - Utilize least restrictive measures   - Set reasonable limits, give positive feedback for acceptable   - Administer medications as ordered and monitor of potential side effects  Outcome: Progressing  Goal: Agree to be compliant with medication regime, as prescribed and report medication side effects  Description: Interventions:  - Offer appropriate PRN medication and supervise ingestion; conduct AIMS, as needed   Outcome: Progressing  Goal: Attend and participate in unit activities, including therapeutic, recreational, and educational groups  Description: Interventions:  -Encourage Visitation and family involvement in care  Outcome: Progressing  Goal: Recognize dysfunctional thoughts, communicate reality-based thoughts at the time of discharge  Description: Interventions:  - Provide medication and psycho-education to assist patient in compliance and developing insight into his/her illness   Outcome: Progressing  Goal: Complete daily ADLs, including personal hygiene independently, as able  Description: Interventions:  - Observe, teach, and assist patient with ADLS  - Monitor and promote a balance of rest/activity, with adequate nutrition and elimination   Outcome: Progressing     Problem: Ineffective Coping  Goal: Cooperates with admission process  Description: Interventions:   - Complete admission process  Outcome: Progressing  Goal: Identifies ineffective coping skills  Outcome: Progressing  Goal: Identifies healthy coping skills  Outcome: Progressing  Goal: Demonstrates healthy coping skills  Outcome: Progressing  Goal: Participates in unit activities  Description: Interventions:  - Provide therapeutic environment   - Provide required programming   - Redirect inappropriate behaviors   Outcome: Progressing  Goal: Patient/Family participate in treatment and DC plans  Description: Interventions:  - Provide therapeutic environment  Outcome: Progressing  Goal: Patient/Family verbalizes awareness of resources  Outcome: Progressing  Goal: Understands least restrictive measures  Description: Interventions:  - Utilize least restrictive behavior  Outcome: Progressing  Goal: Free from restraint events  Description: - Utilize least restrictive measures   - Provide behavioral interventions   - Redirect inappropriate behaviors   Outcome: Progressing     Problem: Risk for Self Injury/Neglect  Goal: Treatment Goal: Remain safe during length of stay, learn and adopt new coping skills, and be free of self-injurious ideation, impulses and acts at the time of discharge  Outcome: Progressing  Goal: Verbalize thoughts and feelings  Description: Interventions:  - Assess and re-assess patient's lethality and potential for self-injury  - Engage patient in 1:1 interactions, daily, for a minimum of 15 minutes  - Encourage patient to express feelings, fears, frustrations, hopes  - Establish rapport/trust with patient   Outcome: Progressing  Goal: Refrain from harming self  Description: Interventions:  - Monitor patient closely, per order  - Develop a trusting relationship  - Supervise medication ingestion, monitor effects and side effects   Outcome: Progressing  Goal: Attend and participate in unit activities, including therapeutic, recreational, and educational groups  Description: Interventions:  - Provide therapeutic and educational activities daily, encourage attendance and participation, and document same in the medical record  - Obtain collateral information, encourage visitation and family involvement in care   Outcome: Progressing  Goal: Recognize maladaptive responses and adopt new coping mechanisms  Outcome: Progressing  Goal: Complete daily ADLs, including personal hygiene independently, as able  Description: Interventions:  - Observe, teach, and assist patient with ADLS  - Monitor and promote a balance of rest/activity, with adequate nutrition and elimination  Outcome: Progressing     Problem: Depression  Goal: Treatment Goal: Demonstrate behavioral control of depressive symptoms, verbalize feelings of improved mood/affect, and adopt new coping skills prior to discharge  Outcome: Progressing  Goal: Verbalize thoughts and feelings  Description: Interventions:  - Assess and re-assess patient's level of risk   - Engage patient in 1:1 interactions, daily, for a minimum of 15 minutes   - Encourage patient to express feelings, fears, frustrations, hopes   Outcome: Progressing  Goal: Refrain from harming self  Description: Interventions:  - Monitor patient closely, per order   - Supervise medication ingestion, monitor effects and side effects   Outcome: Progressing  Goal: Refrain from isolation  Description: Interventions:  - Develop a trusting relationship   - Encourage socialization   Outcome: Progressing  Goal: Refrain from self-neglect  Outcome: Progressing  Goal: Attend and participate in unit activities, including therapeutic, recreational, and educational groups  Description: Interventions:  - Provide therapeutic and educational activities daily, encourage attendance and participation, and document same in the medical record   Outcome: Progressing  Goal: Complete daily ADLs, including personal hygiene independently, as able  Description: Interventions:  - Observe, teach, and assist patient with ADLS  -  Monitor and promote a balance of rest/activity, with adequate nutrition and elimination   Outcome: Progressing     Problem: Anxiety  Goal: Anxiety is at manageable level  Description: Interventions:  - Assess and monitor patient's anxiety level  - Monitor for signs and symptoms (heart palpitations, chest pain, shortness of breath, headaches, nausea, feeling jumpy, restlessness, irritable, apprehensive)  - Collaborate with interdisciplinary team and initiate plan and interventions as ordered    - [place] patient to unit/surroundings  - Explain treatment plan  - Encourage participation in care  - Encourage verbalization of concerns/fears  - Identify coping mechanisms  - Assist in developing anxiety-reducing skills  - Administer/offer alternative therapies  - Limit or eliminate stimulants  Outcome: Progressing     Problem: Risk for Violence/Aggression Toward Others  Goal: Treatment Goal: Refrain from acts of violence/aggression during length of stay, and demonstrate improved impulse control at the time of discharge  Outcome: Progressing  Goal: Verbalize thoughts and feelings  Description: Interventions:  - Assess and re-assess patient's level of risk, every waking shift  - Engage patient in 1:1 interactions, daily, for a minimum of 15 minutes   - Allow patient to express feelings and frustrations in a safe and non-threatening manner   - Establish rapport/trust with patient   Outcome: Progressing  Goal: Refrain from harming others  Outcome: Progressing  Goal: Refrain from destructive acts on the environment or property  Outcome: Progressing  Goal: Control angry outbursts  Description: Interventions:  - Monitor patient closely, per order  - Ensure early verbal de-escalation  - Monitor prn medication needs  - Set reasonable/therapeutic limits, outline behavioral expectations, and consequences   - Provide a non-threatening milieu, utilizing the least restrictive interventions   Outcome: Progressing  Goal: Attend and participate in unit activities, including therapeutic, recreational, and educational groups  Description: Interventions:  - Provide therapeutic and educational activities daily, encourage attendance and participation, and document same in the medical record   Outcome: Progressing  Goal: Identify appropriate positive anger management techniques  Description: Interventions:  - Offer anger management and coping skills groups   - Staff will provide positive feedback for appropriate anger control  Outcome: Progressing     Problem: Alteration in Orientation  Goal: Treatment Goal: Demonstrate a reduction of confusion and improved orientation to person, place, time and/or situation upon discharge, according to optimum baseline/ability  Outcome: Progressing  Goal: Interact with staff daily  Description: Interventions:  - Assess and re-assess patient's level of orientation  - Engage patient in 1 on 1 interactions, daily, for a minimum of 15 minutes   - Establish rapport/trust with patient   Outcome: Progressing  Goal: Express concerns related to confused thinking related to:  Description: Interventions:  - Encourage patient to express feelings, fears, frustrations, hopes  - Assign consistent caregivers   - [place]/re-orient patient as needed  - Allow comfort items, as appropriate  - Provide visual cues, signs, etc    Outcome: Progressing  Goal: Allow medical examinations, as recommended  Description: Interventions:  - Provide physical/neurological exams and/or referrals, per provider   Outcome: Progressing  Goal: Cooperate with recommended testing/procedures  Description: Interventions:  - Determine need for ancillary testing  - Observe for mental status changes  - Implement falls/precaution protocol   Outcome: Progressing  Goal: Attend and participate in unit activities, including therapeutic, recreational, and educational groups  Description: Interventions:  - Provide therapeutic and educational activities daily, encourage attendance and participation, and document same in the medical record   - Provide appropriate opportunities for reminiscence   - Provide a consistent daily routine   - Encourage family contact/visitation   Outcome: Progressing  Goal: Complete daily ADLs, including personal hygiene independently, as able  Description: Interventions:  - Observe, teach, and assist patient with ADLS  Outcome: Progressing     Problem: Individualized Interventions  Goal: Patient will verbalize appropriate use of telephone within 5 days  Description: Interventions:  - Treatment team to determine use of supervised phone privileges   Outcome: Progressing  Goal: Patient will verbalize need for hospitalization and will no longer attempt elopement within 5 days  Description: Interventions:  - Ongoing education to help patient understand need for hospitalization  Outcome: Progressing  Goal: Patient will recognize inappropriate behaviors and develop alternative behaviors within 5 days  Description: Interventions:  - Patient in collaboration with Treatment Team will develop a behavior management plan to help identify effective coping skills to deal with stressors  Outcome: Progressing

## 2021-07-23 NOTE — NURSING NOTE
Patient is visible intermittently  She was calm initially, then had two episodes of shouting and when writer asked what was happening she said it "was not fair what was happening to her; these people should be arrested" (She was referring to a scary outburst from an angry peer (Adelita Avila) who became threatening in a group several minutes before)  She then was reminded that we are here to make sure all the patients are safe and she replied "what can a girl do with HIM"  She then requested/received Robaxin for LE muscle pain and stiffness at 2040 as well as Volteran Gel for BLE bottom of feet

## 2021-07-23 NOTE — SOCIAL WORK
Johnson Mchugh from [facility] called and confirmed she can do intake with patient this coming Monday 7/26 at [place] [address], in anticipation of discharge and CSP (will confirm dates)

## 2021-07-23 NOTE — PLAN OF CARE
Problem: Alteration in Thoughts and Perception  Goal: Treatment Goal: Gain control of psychotic behaviors/thinking, reduce/eliminate presenting symptoms and demonstrate improved reality functioning upon discharge  Outcome: Progressing  Goal: Verbalize thoughts and feelings  Description: Interventions:  - Promote a nonjudgmental and trusting relationship with the patient through active listening and therapeutic communication  - Assess patient's level of functioning, behavior and potential for risk  - Engage patient in 1 on 1 interactions  - Encourage patient to express fears, feelings, frustrations, and discuss symptoms    - [place] patient to reality, help patient recognize reality-based thinking   - Administer medications as ordered and assess for potential side effects  - Provide the patient education related to the signs and symptoms of the illness and desired effects of prescribed medications  Outcome: Progressing  Goal: Refrain from acting on delusional thinking/internal stimuli  Description: Interventions:  - Monitor patient closely, per order   - Utilize least restrictive measures   - Set reasonable limits, give positive feedback for acceptable   - Administer medications as ordered and monitor of potential side effects  Outcome: Progressing  Goal: Agree to be compliant with medication regime, as prescribed and report medication side effects  Description: Interventions:  - Offer appropriate PRN medication and supervise ingestion; conduct AIMS, as needed   Outcome: Progressing  Goal: Attend and participate in unit activities, including therapeutic, recreational, and educational groups  Description: Interventions:  -Encourage Visitation and family involvement in care  Outcome: Progressing  Goal: Recognize dysfunctional thoughts, communicate reality-based thoughts at the time of discharge  Description: Interventions:  - Provide medication and psycho-education to assist patient in compliance and developing insight into his/her illness   Outcome: Progressing  Goal: Complete daily ADLs, including personal hygiene independently, as able  Description: Interventions:  - Observe, teach, and assist patient with ADLS  - Monitor and promote a balance of rest/activity, with adequate nutrition and elimination   Outcome: Progressing     Problem: Ineffective Coping  Goal: Cooperates with admission process  Description: Interventions:   - Complete admission process  Outcome: Progressing  Goal: Identifies ineffective coping skills  Outcome: Progressing  Goal: Identifies healthy coping skills  Outcome: Progressing  Goal: Demonstrates healthy coping skills  Outcome: Progressing  Goal: Participates in unit activities  Description: Interventions:  - Provide therapeutic environment   - Provide required programming   - Redirect inappropriate behaviors   Outcome: Progressing  Goal: Patient/Family participate in treatment and DC plans  Description: Interventions:  - Provide therapeutic environment  Outcome: Progressing  Goal: Patient/Family verbalizes awareness of resources  Outcome: Progressing  Goal: Understands least restrictive measures  Description: Interventions:  - Utilize least restrictive behavior  Outcome: Progressing  Goal: Free from restraint events  Description: - Utilize least restrictive measures   - Provide behavioral interventions   - Redirect inappropriate behaviors   Outcome: Progressing     Problem: Risk for Self Injury/Neglect  Goal: Treatment Goal: Remain safe during length of stay, learn and adopt new coping skills, and be free of self-injurious ideation, impulses and acts at the time of discharge  Outcome: Progressing  Goal: Verbalize thoughts and feelings  Description: Interventions:  - Assess and re-assess patient's lethality and potential for self-injury  - Engage patient in 1:1 interactions, daily, for a minimum of 15 minutes  - Encourage patient to express feelings, fears, frustrations, hopes  - Establish rapport/trust with patient   Outcome: Progressing  Goal: Refrain from harming self  Description: Interventions:  - Monitor patient closely, per order  - Develop a trusting relationship  - Supervise medication ingestion, monitor effects and side effects   Outcome: Progressing  Goal: Attend and participate in unit activities, including therapeutic, recreational, and educational groups  Description: Interventions:  - Provide therapeutic and educational activities daily, encourage attendance and participation, and document same in the medical record  - Obtain collateral information, encourage visitation and family involvement in care   Outcome: Progressing  Goal: Recognize maladaptive responses and adopt new coping mechanisms  Outcome: Progressing  Goal: Complete daily ADLs, including personal hygiene independently, as able  Description: Interventions:  - Observe, teach, and assist patient with ADLS  - Monitor and promote a balance of rest/activity, with adequate nutrition and elimination  Outcome: Progressing     Problem: Depression  Goal: Treatment Goal: Demonstrate behavioral control of depressive symptoms, verbalize feelings of improved mood/affect, and adopt new coping skills prior to discharge  Outcome: Progressing  Goal: Verbalize thoughts and feelings  Description: Interventions:  - Assess and re-assess patient's level of risk   - Engage patient in 1:1 interactions, daily, for a minimum of 15 minutes   - Encourage patient to express feelings, fears, frustrations, hopes   Outcome: Progressing  Goal: Refrain from harming self  Description: Interventions:  - Monitor patient closely, per order   - Supervise medication ingestion, monitor effects and side effects   Outcome: Progressing  Goal: Refrain from isolation  Description: Interventions:  - Develop a trusting relationship   - Encourage socialization   Outcome: Progressing  Goal: Refrain from self-neglect  Outcome: Progressing  Goal: Attend and participate in unit activities, including therapeutic, recreational, and educational groups  Description: Interventions:  - Provide therapeutic and educational activities daily, encourage attendance and participation, and document same in the medical record   Outcome: Progressing  Goal: Complete daily ADLs, including personal hygiene independently, as able  Description: Interventions:  - Observe, teach, and assist patient with ADLS  -  Monitor and promote a balance of rest/activity, with adequate nutrition and elimination   Outcome: Progressing     Problem: Anxiety  Goal: Anxiety is at manageable level  Description: Interventions:  - Assess and monitor patient's anxiety level  - Monitor for signs and symptoms (heart palpitations, chest pain, shortness of breath, headaches, nausea, feeling jumpy, restlessness, irritable, apprehensive)  - Collaborate with interdisciplinary team and initiate plan and interventions as ordered    - [place] patient to unit/surroundings  - Explain treatment plan  - Encourage participation in care  - Encourage verbalization of concerns/fears  - Identify coping mechanisms  - Assist in developing anxiety-reducing skills  - Administer/offer alternative therapies  - Limit or eliminate stimulants  Outcome: Progressing     Problem: Risk for Violence/Aggression Toward Others  Goal: Treatment Goal: Refrain from acts of violence/aggression during length of stay, and demonstrate improved impulse control at the time of discharge  Outcome: Progressing  Goal: Verbalize thoughts and feelings  Description: Interventions:  - Assess and re-assess patient's level of risk, every waking shift  - Engage patient in 1:1 interactions, daily, for a minimum of 15 minutes   - Allow patient to express feelings and frustrations in a safe and non-threatening manner   - Establish rapport/trust with patient   Outcome: Progressing  Goal: Refrain from harming others  Outcome: Progressing  Goal: Refrain from destructive acts on the environment or property  Outcome: Progressing  Goal: Control angry outbursts  Description: Interventions:  - Monitor patient closely, per order  - Ensure early verbal de-escalation  - Monitor prn medication needs  - Set reasonable/therapeutic limits, outline behavioral expectations, and consequences   - Provide a non-threatening milieu, utilizing the least restrictive interventions   Outcome: Progressing  Goal: Attend and participate in unit activities, including therapeutic, recreational, and educational groups  Description: Interventions:  - Provide therapeutic and educational activities daily, encourage attendance and participation, and document same in the medical record   Outcome: Progressing  Goal: Identify appropriate positive anger management techniques  Description: Interventions:  - Offer anger management and coping skills groups   - Staff will provide positive feedback for appropriate anger control  Outcome: Progressing     Problem: Alteration in Orientation  Goal: Treatment Goal: Demonstrate a reduction of confusion and improved orientation to person, place, time and/or situation upon discharge, according to optimum baseline/ability  Outcome: Progressing  Goal: Interact with staff daily  Description: Interventions:  - Assess and re-assess patient's level of orientation  - Engage patient in 1 on 1 interactions, daily, for a minimum of 15 minutes   - Establish rapport/trust with patient   Outcome: Progressing  Goal: Express concerns related to confused thinking related to:  Description: Interventions:  - Encourage patient to express feelings, fears, frustrations, hopes  - Assign consistent caregivers   - Shepherd/re-orient patient as needed  - Allow comfort items, as appropriate  - Provide visual cues, signs, etc    Outcome: Progressing  Goal: Allow medical examinations, as recommended  Description: Interventions:  - Provide physical/neurological exams and/or referrals, per provider   Outcome: Progressing  Goal: Cooperate with recommended testing/procedures  Description: Interventions:  - Determine need for ancillary testing  - Observe for mental status changes  - Implement falls/precaution protocol   Outcome: Progressing  Goal: Attend and participate in unit activities, including therapeutic, recreational, and educational groups  Description: Interventions:  - Provide therapeutic and educational activities daily, encourage attendance and participation, and document same in the medical record   - Provide appropriate opportunities for reminiscence   - Provide a consistent daily routine   - Encourage family contact/visitation   Outcome: Progressing  Goal: Complete daily ADLs, including personal hygiene independently, as able  Description: Interventions:  - Observe, teach, and assist patient with ADLS  Outcome: Progressing     Problem: Individualized Interventions  Goal: Patient will verbalize appropriate use of telephone within 5 days  Description: Interventions:  - Treatment team to determine use of supervised phone privileges   Outcome: Progressing  Goal: Patient will verbalize need for hospitalization and will no longer attempt elopement within 5 days  Description: Interventions:  - Ongoing education to help patient understand need for hospitalization  Outcome: Progressing  Goal: Patient will recognize inappropriate behaviors and develop alternative behaviors within 5 days  Description: Interventions:  - Patient in collaboration with Treatment Team will develop a behavior management plan to help identify effective coping skills to deal with stressors  Outcome: Progressing
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
## 2021-07-23 NOTE — PROGRESS NOTES
07/23/21 1100   Activity/Group Checklist   Group Other (Comment)  (Recovery Management: Boundaries )   Attendance Attended   Attendance Duration (min) 46-60   Interactions Interacted appropriately   Affect/Mood Appropriate;Calm   Goals Achieved Able to reflect/comment on own behavior;Able to self-disclose

## 2021-07-23 NOTE — NURSING NOTE
Pt is med and meal compliant  Visible intermittently on the milieu  Attended IMR  Received voltaren gel and tylenol 975 mg at 1204 for 10/10 bilateral ankle pain  Upon reassessment pt stated pain is a 6/10  She says it feels better when she is not standing on her feet  Pt is going to lay down and rest  No behavioral issues

## 2021-07-23 NOTE — PROGRESS NOTES
Psychiatry Progress Note [facility] 49 y o  female MRN: [id number]  Unit/Bed#: [facility] 000-60 Encounter: [id number]  Code Status: Level 1 - Full Code    PCP: Jaylyn Perez DO    Date of Admission:  4/7/2021 1435   Date of Service:  07/23/21    Patient Active Problem List   Diagnosis    Schizoaffective disorder, bipolar type ([facility] )    Uncomplicated alcohol dependence ([facility] )    Suicidal behavior    LYNN (generalized anxiety disorder)    Slow transit constipation    Deficiency of vitamin B    Degenerative disc disease, lumbar    Post-traumatic stress disorder, chronic    Lower extremity weakness    Generalized abdominal pain    Non-intractable vomiting    Medical clearance for psychiatric admission    Carbuncle    Alcohol dependence with unspecified alcohol-induced disorder ([place] [address] )    Mild intermittent asthma without complication    Tobacco abuse    Ear problem, bilateral    Gastroesophageal reflux disease    Right foot pain    Ear problem, right    Injury of head    Acute sinusitis    Chlamydial cervicitis    Otitis media     Review of systems:     Unremarkable today except for Voltaren gel for leg pain  Diagnosis:           Schizoaffective bipolar, alcohol use disorder in early remission in controlled environment  Assessment   Overall Status:    No complains of voices and no verbal outbursts and no overt delusions verbalized today claiming they are all gone   Certification Statement: The patient will continue to require additional inpatient hospital stay due to ongoing mood swings paranoia   Acceptance by patient:  accepting    Hopefulness in recovery:  Living in a group home   Understanding of medications: has good understanding   Involved in reintegration process:  Adjusting to the unit   Trusting in relationship with psychiatrist:  trusting   Justification for dual anti-psychotics: due to lack of response to single antipsychotics   Side effects from treatment: none    Medication changes    none today  Medication Education; Risks and S/E and precautions of all meds given to patient and she did verbalize an understanding   Non-pharmacological treatments   Continue with individual, group, milieu and occupational therapy using recovery principles and psycho-education about accepting illness and the need for treatment  Safety   Safety and communication plan established to target dynamic risk factors discussed above  Discharge Plan     most likely to a group home with the act team again     Interval Progress       In good spirits attending groups and claims no screaming episodes or voices no longer believes TV is sending her messages or the feds are after her or people are doing hypnosis or people are stalking her no longer thinks she is hearing voices through the vents    She is still hoping for possible discharge next week once picked up by the act team and is hopeful about living at the [facility] were she was already accepted once she has opened up by an act team early next week     Sleep:                                      good   appetite:                                     good   compliance with medications :            good   Side  Effects:                                        None reported   significant events:                                 Improving with no delusions or screaming or voices reported   group attendance:                                 Attending most of the groups    Mental Status Exam  Appearance: age appropriate, casually dressed, looks older than stated age, overweight  Friendly pleasant cooperative well groomed well kept found in her room  Behavior: normal, pleasant, cooperative, appears anxious, mildly anxious,  more friendly today and not confrontational    Relating well well kept and groomed   Speech: fluent, clear, coherent, increased rate, pressured, hypertalkative,   Circumstantial pressured    Mood: depressed, anxious, euphoric  Affect: labile, reactive, slightly brighter    Thought Process: normal abstract reasoning, less prominent, tends to be pressured and circumstantial and preoccupied perseverating on discharge    Thought Content: some paranoia, ideas of reference, but does appear as if paranoid  No current suicidal homicidal thoughts intent or plans verbalized  No phobias obsessions compulsions or distorted body perceptions elicited       No longer talks about TB sending messages or people stalking her or the feds being after her or staff doing hypnosis on  Perceptual Disturbances: no auditory hallucinations, no visual hallucinations, denies auditory hallucinations when asked, does not appear responding to internal stimuli, no voices reported today      No voices reported lately  Hx Risk Factors: chronic mood disorder, chronic psychotic symptoms, alcohol use, limted insight  Sensorium:            Oriented to 3 spheres and to situation  Cognition: recent and remote memory grossly intact  Consciousness: alert and awake  Attention: attention span and concentration are age appropriate  Intellect: appears to be of average intelligence  Insight: improving  Judgement: improving  Motor Activity: no abnormal movements     Vitals  Temp:  [97 7 °F (36 5 °C)] 97 7 °F (36 5 °C)  HR:  [76-99] 99  Resp:  [15-17] 17  BP: ()/(55-74) 94/67  No intake or output data in the 24 hours ending 07/23/21 1024    Lab Results: No New Labs Available For Today     Current Facility-Administered Medications   Medication Dose Route Frequency Provider Last Rate    acetaminophen  650 mg Oral Q6H PRN Debby Link MD      acetaminophen  650 mg Oral Q4H PRN Debby Link MD      acetaminophen  975 mg Oral Q6H PRN Debby Link MD      al mag oxide-diphenhydramine-lidocaine viscous  10 mL Swish & Swallow Q4H PRN Debby Link MD      albuterol  2 puff Inhalation Q6H PRN Debby Link MD      aluminum-magnesium hydroxide-simethicone 15 mL Oral Q4H PRN Nerissa Sanchez MD      amoxicillin-clavulanate  1 tablet Oral Q12H [address] Nakita Oshea PA-C      benztropine  1 mg Intramuscular Q4H PRN Max 6/day Nerissa Sanchez MD      benztropine  1 mg Oral Q4H PRN Max 6/day Nerissa Sanchez MD      benztropine  1 mg Oral BID Nerissa Sanchez MD      calcium carbonate  500 mg Oral TID PRN Nerissa Sanchez MD      Diclofenac Sodium  2 g Topical 4x Daily PRN Nerissa Sanchez MD      diphenhydrAMINE  25 mg Oral HS Nerissa Sanchez MD      fenofibrate  145 mg Oral Daily Nerissa Sanchez MD      gabapentin  600 mg Oral 4x Daily Nerissa Sanchez MD      haloperidol  1 mg Oral 4x Daily Nerissa Sanchez MD      haloperidol  5 mg Oral Q6H PRN Nerissa Sanchez MD      hydrOXYzine HCL  25 mg Oral Q6H PRN Max 4/day Nerissa Sanchez MD      hydrOXYzine HCL  50 mg Oral Q6H PRN Max 4/day Nerissa Sanchez MD      lidocaine  1 patch Topical Daily Nerissa Sanchez MD      LORazepam  1 mg Intramuscular Q6H PRN Nerissa Sanchez MD      LORazepam  0 5 mg Oral 4x Daily Nerissa Sanchez MD      magnesium hydroxide  30 mL Oral Daily PRN Nerissa Sanchez MD      methocarbamol  500 mg Oral Q8H PRN Nerissa Sanchez MD      nicotine  1 patch Transdermal Daily Nerissa Sanchez MD      nicotine polacrilex  4 mg Oral Q2H PRN Nerissa Sanchez MD      OLANZapine  2 5 mg Oral Q8H PRN Nerissa Sanchez MD      OLANZapine  5 mg Oral Q3H PRN Nerissa Sanchez MD      OLANZapine  7 5 mg Oral Q3H PRN Nerissa Sanchez MD      OLANZapine  10 mg Intramuscular Q3H PRN Nerissa Sanchez MD      OLANZapine  5 mg Intramuscular Q3H PRN Nerissa Sanchez MD      OLANZapine  10 mg Oral HS Nerissa Sanchez MD      OLANZapine  15 mg Oral Daily Nerissa Sanchez MD      paliperidone palmitate ER  234 mg Intramuscular Q30 Days Nerissa Sanchez MD      pantoprazole  40 mg Oral Early Morning Nerissa Sanchez MD      Pramox-PE-Glycerin-Petrolatum  1 application Rectal 4x Daily PRN Nerissa Sanchez MD      prazosin  1 mg Oral HS Nerissa Sanchez MD      psyllium  1 packet Oral Daily MD Jessica Castorena sertraline  100 mg Oral Daily Giovani Leavitt MD         Counseling / Coordination of Care: Total floor / unit time spent today 15 minutes  Greater than 50% of total time was spent with the patient and / or family counseling and / or somewhat receptive to supportive listening and teaching positive coping skills to deal with symptom mangement  Patient's Rights, confidentiality and exceptions to confidentiality, use of automated medical record, [facility] staff access to medical record, and consent to treatment reviewed  This note was  not shared with the patient because it might further aggravate her psychiatric condition  This note has been dictated

## 2021-07-23 NOTE — PROGRESS NOTES
07/23/21 1046   Team Meeting   Meeting Type Daily Rounds   Initial Conference Date 07/23/21   Team Members Present   Team Members Present Physician;Nurse; Other (Discipline and Name);    Patient/Family Present   Patient Present No   Patient's Family Present No         Monthly Discharge Disposition Meeting  Daily Rounds Documentation  Team Members Participating  MD Asad Cortez, RN  Liliane Shankar, [address] [facility]  Roselyn Villegas RN    Case reviewed  Still c/o right ear pain, medical has addressed  Conflict with peer over phone use, and now has a phone schedule with designated times she can call, so far she's been agreeable to this  Possibility of discharge next week following a CSP (awaiting parties to confirm when they can have the meeting)  Also awaiting on Errol from [facility] as to when she can officially open patient for services, since she's on vacation this week  If discharged next week, it will likely be Friday to allow time for CSP and related DC tasks

## 2021-07-24 PROCEDURE — 99232 SBSQ HOSP IP/OBS MODERATE 35: CPT | Performed by: REGISTERED NURSE

## 2021-07-24 RX ADMIN — HALOPERIDOL 1 MG: 1 TABLET ORAL at 17:05

## 2021-07-24 RX ADMIN — OLANZAPINE 15 MG: 5 TABLET, FILM COATED ORAL at 08:28

## 2021-07-24 RX ADMIN — AMOXICILLIN AND CLAVULANATE POTASSIUM 1 TABLET: 875; 125 TABLET, FILM COATED ORAL at 21:23

## 2021-07-24 RX ADMIN — NICOTINE 1 PATCH: 21 PATCH, EXTENDED RELEASE TRANSDERMAL at 08:35

## 2021-07-24 RX ADMIN — GABAPENTIN 600 MG: 300 CAPSULE ORAL at 08:28

## 2021-07-24 RX ADMIN — HALOPERIDOL 1 MG: 1 TABLET ORAL at 08:28

## 2021-07-24 RX ADMIN — LORAZEPAM 0.5 MG: 0.5 TABLET ORAL at 08:28

## 2021-07-24 RX ADMIN — FENOFIBRATE 145 MG: 145 TABLET ORAL at 08:29

## 2021-07-24 RX ADMIN — LIDOCAINE 1 PATCH: 50 PATCH CUTANEOUS at 08:35

## 2021-07-24 RX ADMIN — METHOCARBAMOL TABLETS 500 MG: 500 TABLET, COATED ORAL at 11:16

## 2021-07-24 RX ADMIN — AMOXICILLIN AND CLAVULANATE POTASSIUM 1 TABLET: 875; 125 TABLET, FILM COATED ORAL at 08:33

## 2021-07-24 RX ADMIN — PANTOPRAZOLE SODIUM 40 MG: 40 TABLET, DELAYED RELEASE ORAL at 06:09

## 2021-07-24 RX ADMIN — LORAZEPAM 0.5 MG: 0.5 TABLET ORAL at 17:05

## 2021-07-24 RX ADMIN — GABAPENTIN 600 MG: 300 CAPSULE ORAL at 21:19

## 2021-07-24 RX ADMIN — HALOPERIDOL 1 MG: 1 TABLET ORAL at 21:20

## 2021-07-24 RX ADMIN — BENZTROPINE MESYLATE 1 MG: 1 TABLET ORAL at 17:05

## 2021-07-24 RX ADMIN — DIPHENHYDRAMINE HCL 25 MG: 25 TABLET ORAL at 21:20

## 2021-07-24 RX ADMIN — BENZTROPINE MESYLATE 1 MG: 1 TABLET ORAL at 08:29

## 2021-07-24 RX ADMIN — PSYLLIUM HUSK 1 PACKET: 3.4 POWDER ORAL at 08:29

## 2021-07-24 RX ADMIN — GABAPENTIN 600 MG: 300 CAPSULE ORAL at 12:07

## 2021-07-24 RX ADMIN — LORAZEPAM 0.5 MG: 0.5 TABLET ORAL at 21:19

## 2021-07-24 RX ADMIN — OLANZAPINE 10 MG: 10 TABLET, FILM COATED ORAL at 21:20

## 2021-07-24 RX ADMIN — LORAZEPAM 0.5 MG: 0.5 TABLET ORAL at 12:07

## 2021-07-24 RX ADMIN — PHENYTOIN 1 MG: 125 SUSPENSION ORAL at 21:20

## 2021-07-24 RX ADMIN — SERTRALINE HYDROCHLORIDE 100 MG: 100 TABLET ORAL at 08:28

## 2021-07-24 RX ADMIN — HALOPERIDOL 1 MG: 1 TABLET ORAL at 12:07

## 2021-07-24 RX ADMIN — GABAPENTIN 600 MG: 300 CAPSULE ORAL at 17:05

## 2021-07-24 NOTE — NURSING NOTE
Alert, cooperative and visible intermittently  No behaviors noted at this time  Consumed 100% of dinner  Took all medication without prompting  Maintained on safe precautions without incident

## 2021-07-24 NOTE — PLAN OF CARE
Problem: Alteration in Thoughts and Perception  Goal: Agree to be compliant with medication regime, as prescribed and report medication side effects  Description: Interventions:  - Offer appropriate PRN medication and supervise ingestion; conduct AIMS, as needed   Outcome: Progressing

## 2021-07-24 NOTE — NURSING NOTE
Alert, cooperative and visible intermittently  No SI or HI noted  Denies depression, anxiety and pain  This am pt heard yelling from room  Upon writer entering pt's room pt stated that she feels it is wrong to feel this way and stated she was in need of a muscle relaxer for her back  PRN muscle relaxer administered @ 1116 and was effective after administration  Attended community meeting  Consumed 100% of breakfast and 100 % of lunch  Took all medication without prompting  Maintained on safe precautions without incident   Will continue to monitor progress and recovery program

## 2021-07-24 NOTE — PROGRESS NOTES
Progress Note - Behavioral Health   Masoud Henderson 49 y o  female MRN: [id number]  [facility] Encounter: [id number]    Assessment/Plan   Principal Problem:    Schizoaffective disorder, bipolar type ([address] )  Active Problems:    Post-traumatic stress disorder, chronic    Medical clearance for psychiatric admission    Mild intermittent asthma without complication    Ear problem, bilateral    Gastroesophageal reflux disease    Right foot pain    Ear problem, right    Otitis media      Subjective:Patient was seen today for continuation of care, records reviewed and  patient was discussed with the charge nurse  Patient was in her room lying down  She was cooperative, but guarded with provider  She reports that her sleep and appetite are "good"  She denies suicidal or homicidal ideation  She also denies any auditory or visual hallucinations  She did reports feeling some irritability and just "wanted to relax"  Danny Menjivar has been in behavioral control  She reports hopeful for discharge to group home soon      Psychiatric Review of Systems:    Sleep: normal  Appetite: normal  Medication side effects: No   ROS: had no complaints with exception of speaking about being on antibiotic for "ear infection"    Vitals:  Vitals:    07/24/21 0708   BP: 99/68   Pulse: 97   Resp: 16   Temp: 97 5 °F (36 4 °C)   SpO2:        Mental Status Evaluation:    Appearance:  age appropriate, casually dressed, dressed appropriately, adequate grooming   Behavior:  cooperative, calm, reports "some irritabilty"   Speech:  normal rate and volume   Mood:  depressed, anxious   Affect:  constricted   Thought Process:  focused on discharge   Associations: intact associations   Thought Content:  preoccupied, slight paranoia    Perceptual Disturbances: none   Risk Potential: Suicidal ideation - None  Homicidal ideation - None  Potential for aggression - No   Sensorium:  oriented to person, place and time/date   Memory:  recent and remote memory grossly intact   Consciousness:  alert and awake   Attention: attention span and concentration are age appropriate   Insight:  improved   Judgment: improving   Gait/Station: normal gait/station   Motor Activity: no abnormal movements     Laboratory results:  I have personally reviewed all pertinent laboratory/tests results      Progress Toward Goals:     progressing    Recommended Treatment: Continue current medication regimen and treatment plan    All current active medications have been reviewed  Encourage group therapy, milieu therapy and occupational therapy  Behavioral Health checks every 7 minutes  Continue current medications:  Current Facility-Administered Medications   Medication Dose Route Frequency Provider Last Rate    acetaminophen  650 mg Oral Q6H PRN Leidy Mcfarland MD      acetaminophen  650 mg Oral Q4H PRN Leidy Mcfarland MD      acetaminophen  975 mg Oral Q6H PRN Leidy Mcfarland MD      al mag oxide-diphenhydramine-lidocaine viscous  10 mL Swish & Swallow Q4H PRN Leidy Mcfarland MD      albuterol  2 puff Inhalation Q6H PRN Leidy Mcfarland MD      aluminum-magnesium hydroxide-simethicone  15 mL Oral Q4H PRN Leidy Mcfarland MD      amoxicillin-clavulanate  1 tablet Oral Q12H [facility] Yanet Campbell PA-C      benztropine  1 mg Intramuscular Q4H PRN Max 6/day Leidy Mcfarland MD      benztropine  1 mg Oral Q4H PRN Max 6/day Leidy Mcfarland MD      benztropine  1 mg Oral BID Leidy Mcfarland MD      calcium carbonate  500 mg Oral TID PRN Leidy Mcfarland MD      Diclofenac Sodium  2 g Topical 4x Daily PRN Leidy Mcfarland MD      diphenhydrAMINE  25 mg Oral HS Leidy Mcfarland MD      fenofibrate  145 mg Oral Daily Leidy Mcfarland MD      gabapentin  600 mg Oral 4x Daily eLidy Mcfarland MD      haloperidol  1 mg Oral 4x Daily Leidy Mcfarland MD      haloperidol  5 mg Oral Q6H PRN Leidy Mcfarland MD      hydrOXYzine HCL  25 mg Oral Q6H PRN Max 4/day Leidy Mcfarland MD      hydrOXYzine HCL  50 mg Oral Q6H PRN Max 4/day Leidy Mcfarland MD     [facility] lidocaine  1 patch Topical Daily Mariel Philip MD      LORazepam  1 mg Intramuscular Q6H PRN Mariel Philip MD      LORazepam  0 5 mg Oral 4x Daily Mariel Philip MD      magnesium hydroxide  30 mL Oral Daily PRN Mariel Philip MD      methocarbamol  500 mg Oral Q8H PRN Mariel Philip MD      nicotine  1 patch Transdermal Daily Mariel Philip MD      nicotine polacrilex  4 mg Oral Q2H PRN Mariel Philip MD      OLANZapine  2 5 mg Oral Q8H PRN Mariel Philip MD      OLANZapine  5 mg Oral Q3H PRN Mariel Philip MD      OLANZapine  7 5 mg Oral Q3H PRN Mariel Philip MD      OLANZapine  10 mg Intramuscular Q3H PRN Mariel Philip MD      OLANZapine  5 mg Intramuscular Q3H PRN Mariel Philip MD      OLANZapine  10 mg Oral HS Mariel Philip MD      OLANZapine  15 mg Oral Daily Mariel Philip MD      paliperidone palmitate ER  234 mg Intramuscular Q30 Days Mariel Philip MD      pantoprazole  40 mg Oral Early Morning Mariel Philip MD      Pramox-PE-Glycerin-Petrolatum  1 application Rectal 4x Daily PRN Mariel Philip MD      prazosin  1 mg Oral HS Mariel Philip MD      psyllium  1 packet Oral Daily Mariel Philip MD      sertraline  100 mg Oral Daily Mariel Philip MD         Risks / Benefits of Treatment:     Risks, benefits, and possible side effects of medications explained to patient and patient verbalizes understanding and agreement for treatment  Counseling / Coordination of Care:     Patient's progress reviewed with nursing staff  Medications, treatment progress and treatment plan reviewed with patient  Supportive counseling provided to the patient          JARED Tobin

## 2021-07-25 PROCEDURE — 99232 SBSQ HOSP IP/OBS MODERATE 35: CPT | Performed by: REGISTERED NURSE

## 2021-07-25 RX ADMIN — GABAPENTIN 600 MG: 300 CAPSULE ORAL at 17:03

## 2021-07-25 RX ADMIN — METHOCARBAMOL TABLETS 500 MG: 500 TABLET, COATED ORAL at 14:25

## 2021-07-25 RX ADMIN — SERTRALINE HYDROCHLORIDE 100 MG: 100 TABLET ORAL at 08:45

## 2021-07-25 RX ADMIN — PHENYTOIN 1 MG: 125 SUSPENSION ORAL at 21:41

## 2021-07-25 RX ADMIN — BENZTROPINE MESYLATE 1 MG: 1 TABLET ORAL at 08:45

## 2021-07-25 RX ADMIN — DIPHENHYDRAMINE HCL 25 MG: 25 TABLET ORAL at 21:41

## 2021-07-25 RX ADMIN — GABAPENTIN 600 MG: 300 CAPSULE ORAL at 08:46

## 2021-07-25 RX ADMIN — AMOXICILLIN AND CLAVULANATE POTASSIUM 1 TABLET: 875; 125 TABLET, FILM COATED ORAL at 08:47

## 2021-07-25 RX ADMIN — LORAZEPAM 0.5 MG: 0.5 TABLET ORAL at 11:59

## 2021-07-25 RX ADMIN — GABAPENTIN 600 MG: 300 CAPSULE ORAL at 11:59

## 2021-07-25 RX ADMIN — OLANZAPINE 10 MG: 10 TABLET, FILM COATED ORAL at 21:41

## 2021-07-25 RX ADMIN — LORAZEPAM 0.5 MG: 0.5 TABLET ORAL at 21:41

## 2021-07-25 RX ADMIN — HALOPERIDOL 1 MG: 1 TABLET ORAL at 08:48

## 2021-07-25 RX ADMIN — HALOPERIDOL 1 MG: 1 TABLET ORAL at 17:03

## 2021-07-25 RX ADMIN — OLANZAPINE 15 MG: 5 TABLET, FILM COATED ORAL at 08:44

## 2021-07-25 RX ADMIN — LIDOCAINE 1 PATCH: 50 PATCH CUTANEOUS at 08:43

## 2021-07-25 RX ADMIN — LORAZEPAM 0.5 MG: 0.5 TABLET ORAL at 08:46

## 2021-07-25 RX ADMIN — PSYLLIUM HUSK 1 PACKET: 3.4 POWDER ORAL at 08:44

## 2021-07-25 RX ADMIN — LORAZEPAM 0.5 MG: 0.5 TABLET ORAL at 08:45

## 2021-07-25 RX ADMIN — NICOTINE 1 PATCH: 21 PATCH, EXTENDED RELEASE TRANSDERMAL at 08:48

## 2021-07-25 RX ADMIN — BENZTROPINE MESYLATE 1 MG: 1 TABLET ORAL at 17:03

## 2021-07-25 RX ADMIN — PANTOPRAZOLE SODIUM 40 MG: 40 TABLET, DELAYED RELEASE ORAL at 06:06

## 2021-07-25 RX ADMIN — FENOFIBRATE 145 MG: 145 TABLET ORAL at 08:44

## 2021-07-25 RX ADMIN — AMOXICILLIN AND CLAVULANATE POTASSIUM 1 TABLET: 875; 125 TABLET, FILM COATED ORAL at 21:41

## 2021-07-25 RX ADMIN — LORAZEPAM 0.5 MG: 0.5 TABLET ORAL at 17:03

## 2021-07-25 RX ADMIN — GABAPENTIN 600 MG: 300 CAPSULE ORAL at 21:41

## 2021-07-25 RX ADMIN — HALOPERIDOL 1 MG: 1 TABLET ORAL at 11:58

## 2021-07-25 RX ADMIN — HALOPERIDOL 1 MG: 1 TABLET ORAL at 21:41

## 2021-07-25 NOTE — PROGRESS NOTES
Progress Note - Behavioral Health   Dois Fred 49 y o  female MRN: [id number]  [facility] Encounter: [id number]    Assessment/Plan   Principal Problem:    Schizoaffective disorder, bipolar type ([address] )  Active Problems:    Post-traumatic stress disorder, chronic    Medical clearance for psychiatric admission    Mild intermittent asthma without complication    Ear problem, bilateral    Gastroesophageal reflux disease    Right foot pain    Ear problem, right    Otitis media      Subjective:Patient was seen today for continuation of care, records reviewed and  patient was discussed with the charge nurse  Mark Kwon was seen to be visible on the unit  She reports that she is having a good day today  She denies any suicidal or homicidal ideation  She denies any auditory or visual hallucinations  She reports she has an ACT intake on Monday and looks forward to it  She reports her ear is improving and continues on antibiotic therapy  She is compliant with her medication and denies any medication side effects  She denies any depressive symptoms, reports slight anxiety but stated it is'  manageable"  Mark Kwon reports that both her appetite and sleep are "good"  She was more engaged with provider today and was noted to be smiling at times  Mark Kwon has attended groups today and has been in behavioral control       Psychiatric Review of Systems:    Sleep: normal  Appetite: normal  Medication side effects: No   ROS: no complaints, all other systems are negative    Vitals:  Vitals:    07/25/21 0700   BP: 102/76   Pulse: 82   Resp: 18   Temp: 97 7 °F (36 5 °C)   SpO2:        Mental Status Evaluation:    Appearance:  age appropriate, casually dressed, dressed appropriately, adequate grooming   Behavior:  pleasant, cooperative, calm   Speech:  normal rate and volume   Mood:  anxious   Affect:  mood-congruent   Thought Process:  organized, logical, coherent   Associations: intact associations   Thought Content:  no overt delusions   Perceptual Disturbances: none   Risk Potential: Suicidal ideation - None  Homicidal ideation - None  Potential for aggression - No   Sensorium:  oriented to person, place and time/date   Memory:  recent and remote memory grossly intact   Consciousness:  alert and awake   Attention: attention span and concentration are age appropriate   Insight:  improving   Judgment: improving   Gait/Station: normal gait/station   Motor Activity: no abnormal movements     Laboratory results:  I have personally reviewed all pertinent laboratory/tests results      Progress Toward Goals:     progressing    Recommended Treatment: Continue current medication regimen and treatment plan    All current active medications have been reviewed  Encourage group therapy, milieu therapy and occupational therapy  Behavioral Health checks every 7 minutes  Continue current medications:  Current Facility-Administered Medications   Medication Dose Route Frequency Provider Last Rate    acetaminophen  650 mg Oral Q6H PRN Brooke Mack MD      acetaminophen  650 mg Oral Q4H PRN Brooke Mack MD      acetaminophen  975 mg Oral Q6H PRN Brooke Mack MD      al mag oxide-diphenhydramine-lidocaine viscous  10 mL Swish & Swallow Q4H PRN Brooke Mack MD      albuterol  2 puff Inhalation Q6H PRN Brooke Mack MD      aluminum-magnesium hydroxide-simethicone  15 mL Oral Q4H PRN Brooke Mack MD      amoxicillin-clavulanate  1 tablet Oral Q12H [facility] Thanh Veloz PA-C      benztropine  1 mg Intramuscular Q4H PRN Max 6/day Brooke Mack MD      benztropine  1 mg Oral Q4H PRN Max 6/day Brooke Mack MD      benztropine  1 mg Oral BID Brooke Mack MD      calcium carbonate  500 mg Oral TID PRN Brooke Mack MD      Diclofenac Sodium  2 g Topical 4x Daily PRN Brooke Mack MD      diphenhydrAMINE  25 mg Oral HS Brooke Mack MD      fenofibrate  145 mg Oral Daily Brooke Mack MD      gabapentin  600 mg Oral 4x Daily Brooke Mack MD     [facility] haloperidol  1 mg Oral 4x Daily Eze Plata MD      haloperidol  5 mg Oral Q6H PRN Eze Plata MD      hydrOXYzine HCL  25 mg Oral Q6H PRN Max 4/day Eze Plata MD      hydrOXYzine HCL  50 mg Oral Q6H PRN Max 4/day Eze Plata MD      lidocaine  1 patch Topical Daily Eze Plata MD      LORazepam  1 mg Intramuscular Q6H PRN Eze Plata MD      LORazepam  0 5 mg Oral 4x Daily Eze Plata MD      magnesium hydroxide  30 mL Oral Daily PRN Eze Plata MD      methocarbamol  500 mg Oral Q8H PRN Eze Plata MD      nicotine  1 patch Transdermal Daily Eze Plata MD      nicotine polacrilex  4 mg Oral Q2H PRN Eze Plata MD      OLANZapine  2 5 mg Oral Q8H PRN Eze lPata MD      OLANZapine  5 mg Oral Q3H PRN Eze Plata MD      OLANZapine  7 5 mg Oral Q3H PRN Eze Plata MD      OLANZapine  10 mg Intramuscular Q3H PRN Eze Plata MD      OLANZapine  5 mg Intramuscular Q3H PRN Eze Plata MD      OLANZapine  10 mg Oral HS Eze Plata MD      OLANZapine  15 mg Oral Daily Eze Plata MD      paliperidone palmitate ER  234 mg Intramuscular Q30 Days Eze Plata MD      pantoprazole  40 mg Oral Early Morning Eze Plata MD      Pramox-PE-Glycerin-Petrolatum  1 application Rectal 4x Daily PRN Eze Plata MD      prazosin  1 mg Oral HS Eze Plata MD      psyllium  1 packet Oral Daily Eze Plata MD      sertraline  100 mg Oral Daily Eze Plata MD         Risks / Benefits of Treatment:     Risks, benefits, and possible side effects of medications explained to patient and patient verbalizes understanding and agreement for treatment  Counseling / Coordination of Care:     Patient's progress reviewed with nursing staff  Medications, treatment progress and treatment plan reviewed with patient  Supportive counseling provided to the patient          JARED Rothman

## 2021-07-25 NOTE — NURSING NOTE
Cooperative with staff, behavior controlled, attended evening groups, had a hs snack with peers, compliant with routine medications, will continue to monitor

## 2021-07-25 NOTE — PLAN OF CARE
Problem: Risk for Violence/Aggression Toward Others  Goal: Refrain from harming others  Outcome: Progressing     Problem: Alteration in Orientation  Goal: Attend and participate in unit activities, including therapeutic, recreational, and educational groups  Description: Interventions:  - Provide therapeutic and educational activities daily, encourage attendance and participation, and document same in the medical record   - Provide appropriate opportunities for reminiscence   - Provide a consistent daily routine   - Encourage family contact/visitation   Outcome: Progressing

## 2021-07-25 NOTE — NURSING NOTE
Alert, cooperative and visible intermittently  No SI or HI noted  Denies depression, anxiety and pain  Attended community meeting and journaling  Consumed 100% of breakfast and 100% of lunch  Took all medication without prompting  1hr after lunch pt goes to room and yells out stating why is this happening to me is the staff causing this  Upon writer entering pt's room  Pt asked writer if she could have her robaxin for her back  Robaxin administered @ 1425 for muscle spasms  Results pending  No further yelling noted from pt at this time  Remains on Augmentin for R ear infection  Afebrile  No s/s of adverse reaction to Augmentin  Maintained on safe precautions without incident   Will continue to monitor progress and recovery program

## 2021-07-26 LAB — SARS-COV-2 RNA RESP QL NAA+PROBE: NEGATIVE

## 2021-07-26 PROCEDURE — 99232 SBSQ HOSP IP/OBS MODERATE 35: CPT | Performed by: INTERNAL MEDICINE

## 2021-07-26 PROCEDURE — 99232 SBSQ HOSP IP/OBS MODERATE 35: CPT | Performed by: PSYCHIATRY & NEUROLOGY

## 2021-07-26 PROCEDURE — U0005 INFEC AGEN DETEC AMPLI PROBE: HCPCS | Performed by: PSYCHIATRY & NEUROLOGY

## 2021-07-26 PROCEDURE — U0003 INFECTIOUS AGENT DETECTION BY NUCLEIC ACID (DNA OR RNA); SEVERE ACUTE RESPIRATORY SYNDROME CORONAVIRUS 2 (SARS-COV-2) (CORONAVIRUS DISEASE [COVID-19]), AMPLIFIED PROBE TECHNIQUE, MAKING USE OF HIGH THROUGHPUT TECHNOLOGIES AS DESCRIBED BY CMS-2020-01-R: HCPCS | Performed by: PSYCHIATRY & NEUROLOGY

## 2021-07-26 RX ADMIN — AMOXICILLIN AND CLAVULANATE POTASSIUM 1 TABLET: 875; 125 TABLET, FILM COATED ORAL at 08:19

## 2021-07-26 RX ADMIN — HALOPERIDOL 1 MG: 1 TABLET ORAL at 11:23

## 2021-07-26 RX ADMIN — BENZTROPINE MESYLATE 1 MG: 1 TABLET ORAL at 08:14

## 2021-07-26 RX ADMIN — LORAZEPAM 0.5 MG: 0.5 TABLET ORAL at 08:14

## 2021-07-26 RX ADMIN — BENZTROPINE MESYLATE 1 MG: 1 TABLET ORAL at 17:18

## 2021-07-26 RX ADMIN — LORAZEPAM 0.5 MG: 0.5 TABLET ORAL at 11:23

## 2021-07-26 RX ADMIN — GABAPENTIN 600 MG: 300 CAPSULE ORAL at 08:13

## 2021-07-26 RX ADMIN — GABAPENTIN 600 MG: 300 CAPSULE ORAL at 17:17

## 2021-07-26 RX ADMIN — LORAZEPAM 0.5 MG: 0.5 TABLET ORAL at 21:04

## 2021-07-26 RX ADMIN — SERTRALINE HYDROCHLORIDE 100 MG: 100 TABLET ORAL at 08:13

## 2021-07-26 RX ADMIN — PSYLLIUM HUSK 1 PACKET: 3.4 POWDER ORAL at 08:14

## 2021-07-26 RX ADMIN — LORAZEPAM 0.5 MG: 0.5 TABLET ORAL at 17:18

## 2021-07-26 RX ADMIN — LIDOCAINE 1 PATCH: 50 PATCH CUTANEOUS at 08:14

## 2021-07-26 RX ADMIN — GABAPENTIN 600 MG: 300 CAPSULE ORAL at 21:04

## 2021-07-26 RX ADMIN — GABAPENTIN 600 MG: 300 CAPSULE ORAL at 11:23

## 2021-07-26 RX ADMIN — OLANZAPINE 15 MG: 5 TABLET, FILM COATED ORAL at 08:13

## 2021-07-26 RX ADMIN — HALOPERIDOL 1 MG: 1 TABLET ORAL at 17:17

## 2021-07-26 RX ADMIN — OLANZAPINE 10 MG: 10 TABLET, FILM COATED ORAL at 21:04

## 2021-07-26 RX ADMIN — DIPHENHYDRAMINE HCL 25 MG: 25 TABLET ORAL at 21:04

## 2021-07-26 RX ADMIN — HALOPERIDOL 1 MG: 1 TABLET ORAL at 08:13

## 2021-07-26 RX ADMIN — FENOFIBRATE 145 MG: 145 TABLET ORAL at 08:14

## 2021-07-26 RX ADMIN — NICOTINE 1 PATCH: 21 PATCH, EXTENDED RELEASE TRANSDERMAL at 08:14

## 2021-07-26 RX ADMIN — PANTOPRAZOLE SODIUM 40 MG: 40 TABLET, DELAYED RELEASE ORAL at 06:13

## 2021-07-26 RX ADMIN — HALOPERIDOL 1 MG: 1 TABLET ORAL at 21:04

## 2021-07-26 RX ADMIN — PHENYTOIN 1 MG: 125 SUSPENSION ORAL at 21:04

## 2021-07-26 RX ADMIN — DICLOFENAC SODIUM 2 G: 10 GEL TOPICAL at 21:07

## 2021-07-26 NOTE — PROGRESS NOTES
07/26/21 1100   Activity/Group Checklist   Group   ([facility]   Attendance Attended   Attendance Duration (min) 46-60   Interactions Interacted appropriately   Affect/Mood Appropriate;Bright;Calm;Normal range   Goals Achieved Identified feelings; Discussed discharge plans; Able to listen to others; Able to engage in interactions; Able to self-disclose

## 2021-07-26 NOTE — NURSING NOTE
Cooperative with staff, behavior controlled, attended one group this evening, had a hs snack with peers, compliant with routine medications, will continue to monitor

## 2021-07-26 NOTE — ASSESSMENT & PLAN NOTE
· The pathophysiology of reflux was discussed  Anti-reflux measures such as raising the head of the bed, avoiding tight clothing or belts, avoiding eating late at night and not lying down shortly after mealtime and achieving weight loss are discussed  Avoid ASA, NSAID's, caffeine, peppermints, alcohol and tobacco  H2 blockers and/or antacids are often very helpful for PRN use  She should alert nursing if there are persistent symptoms, dysphagia, weight loss or GI bleeding    · Plan  · Continue pantoprazole 40 mg PO daily

## 2021-07-26 NOTE — ASSESSMENT & PLAN NOTE
· Reports a right foot injury from falling down stairs over twenty years ago, however no treatment/imaging obtained  · Likely plantar faciitis  · Patient education on stretching plantar fascia upon waking and several time throughout the day  · Can use ice/tylenol for pain  · Continue topical voltaren gel as needed for pain

## 2021-07-26 NOTE — PROGRESS NOTES
07/26/21 1557   Team Meeting   Meeting Type Daily Rounds   Initial Conference Date 07/26/21   Patient/Family Present   Patient Present No   Patient's Family Present No       Daily Rounds Documentation  Team Members Participating  MD Fina Lagunas, RN  Zain Whelan, DECLANW  Isrrael Rahman, DECLANW  So Salgado, CPS    Case reviewed  Robaxin for restless legs  Minor outburst but controlled   Preparing for discharge Thursday- CSP tomorrow at 10am

## 2021-07-26 NOTE — PROGRESS NOTES
[facility]  Progress Note Librado Mcdonald 1970, 49 y o  female MRN: [id number]  [facility] Encounter: [id number]  Primary Care Provider: Hugo Beckett DO   Date and time admitted to hospital: 4/7/2021  2:35 PM    * Schizoaffective disorder, bipolar type ([address] )  Assessment & Plan  · Management per Psychiatry    Right foot pain  Assessment & Plan  · Reports a right foot injury from falling down stairs over twenty years ago, however no treatment/imaging obtained  · Likely plantar faciitis  · Patient education on stretching plantar fascia upon waking and several time throughout the day  · Can use ice/tylenol for pain  · Continue topical voltaren gel as needed for pain    Gastroesophageal reflux disease  Assessment & Plan  · The pathophysiology of reflux was discussed  Anti-reflux measures such as raising the head of the bed, avoiding tight clothing or belts, avoiding eating late at night and not lying down shortly after mealtime and achieving weight loss are discussed  Avoid ASA, NSAID's, caffeine, peppermints, alcohol and tobacco  H2 blockers and/or antacids are often very helpful for PRN use  She should alert nursing if there are persistent symptoms, dysphagia, weight loss or GI bleeding  · Plan  · Continue pantoprazole 40 mg PO daily     Mild intermittent asthma without complication  Assessment & Plan  · No acute exacerbation  · Continue albuterol inhaler as needed for wheezing    Post-traumatic stress disorder, chronic  Assessment & Plan  · Management per Psychiatry    Nurse Coordination of Care Discussion: Discussed with treatment team RN    Discussions with Specialists or Other Care Team Provider: None    Education and Discussions with Family / Patient: All patient questions answered to the best of my ability    Time Spent for Care: 30 minutes  More than 50% of total time spent on counseling and coordination of care as described above      Current Length of Stay: 110 day(s)    Code Status: Level 1 - Full Code      Subjective:   Patient has no medical complaints  Vital signs are stable  Patient is medically cleared for discharge from the [facility] 2021  Objective:     Vitals:   Temp (24hrs), Av 4 °F (36 3 °C), Min:97 2 °F (36 2 °C), Max:97 5 °F (36 4 °C)    Temp:  [97 2 °F (36 2 °C)-97 5 °F (36 4 °C)] 97 2 °F (36 2 °C)  HR:  [83-91] 91  Resp:  [17] 17  BP: (107-131)/(56-96) 113/75  Body mass index is 27 67 kg/m²  Physical Exam:     Physical Exam  Vitals reviewed  Constitutional:       General: She is not in acute distress  HENT:      Head: Normocephalic and atraumatic  Nose: No congestion or rhinorrhea  Mouth/Throat:      Mouth: Mucous membranes are moist       Pharynx: Oropharynx is clear  Eyes:      General: No scleral icterus  Pupils: Pupils are equal, round, and reactive to light  Cardiovascular:      Rate and Rhythm: Normal rate and regular rhythm  Pulses: Normal pulses  Heart sounds: No murmur heard  Pulmonary:      Effort: Pulmonary effort is normal       Breath sounds: Normal breath sounds  No wheezing or rales  Abdominal:      General: Bowel sounds are normal  There is no distension  Palpations: Abdomen is soft  Tenderness: There is no abdominal tenderness  Musculoskeletal:         General: Normal range of motion  Right lower leg: No edema  Left lower leg: No edema  Skin:     General: Skin is warm and dry  Findings: No rash  Neurological:      Mental Status: She is alert and oriented to person, place, and time  Psychiatric:         Mood and Affect: Mood normal            Additional Data:     Labs:      * I Have Reviewed All Lab Data Listed Above    * Additional Pertinent Lab Tests Reviewed: No New Labs Available For Today    Imaging:    Imaging Reports Reviewed Today Include: None new      Last 24 Hours Medication List:   Current Facility-Administered Medications   Medication Dose Route Frequency Provider Last Rate    acetaminophen  650 mg Oral Q6H PRN Jason Nielson, MD      acetaminophen  650 mg Oral Q4H PRN Jason Nielson, MD      acetaminophen  975 mg Oral Q6H PRN Jason Nielson, MD      al mag oxide-diphenhydramine-lidocaine viscous  10 mL Swish & Swallow Q4H PRN Jason Nielson MD      albuterol  2 puff Inhalation Q6H PRN Jason Nielson MD      aluminum-magnesium hydroxide-simethicone  15 mL Oral Q4H PRN Jason Nielson MD      benztropine  1 mg Intramuscular Q4H PRN Max 6/day [facility] Nielson, MD      benztropine  1 mg Oral Q4H PRN Max 6/day Jason Nielson, MD      benztropine  1 mg Oral BID Jason Nielson MD      calcium carbonate  500 mg Oral TID PRN Jason Nielson, MD      Diclofenac Sodium  2 g Topical 4x Daily PRN Jason Nielson MD      diphenhydrAMINE  25 mg Oral HS Jason Nielson MD      fenofibrate  145 mg Oral Daily [facility] Hospit[facility] Nielson, MD      gabapentin  600 mg Oral 4x Daily [facility] Nielson MD      haloperidol  1 mg Oral 4x Daily [facility] Nielson MD      haloperidol  5 mg Oral Q6H PRN Jason Nielson MD      hydrOXYzine HCL  25 mg Oral Q6H PRN Max 4/day [facility] Nielson MD      hydrOXYzine HCL  50 mg Oral Q6H PRN Max 4/day [facility] Nielson MD      lidocaine  1 patch Topical Daily Jason Nielson MD      LORazepam  1 mg Intramuscular Q6H PRN Jason Nielson MD      LORazepam  0 5 mg Oral 4x Daily [facility] Nielson MD      magnesium hydroxide  30 mL Oral Daily PRN Jason Nielson MD      methocarbamol  500 mg Oral Q8H PRN Jason Nielson MD      nicotine  1 patch Transdermal Daily Jason Nielson MD      nicotine polacrilex  4 mg Oral Q2H PRN Jason Nielson MD      OLANZapine  2 5 mg Oral Q8H PRN Jason Nielson MD      OLANZapine  5 mg Oral Q3H PRN Jason Nielson MD      OLANZapine  7 5 mg Oral Q3H PRN [facility] Nielson MD      OLANZapine  10 mg Intramuscular Q3H PRN Jason Nielson MD      OLANZapine  5 mg Intramuscular Q3H PRN Jason Nielson MD      OLANZapine  10 mg Oral HS Jason Nielson MD      OLANZapine  15 mg Oral Daily Genie Graham Brea Medrano MD      paliperidone palmitate ER  234 mg Intramuscular Q30 Days Eze Plata MD      pantoprazole  40 mg Oral Early Morning Eez Plata MD      Pramox-PE-Glycerin-Petrolatum  1 application Rectal 4x Daily PRN Eze Plata MD      prazosin  1 mg Oral HS Eze Plata MD      psyllium  1 packet Oral Daily Eze Plata MD      sertraline  100 mg Oral Daily Eze Plata MD          Today, Patient Was Seen By: Sanju Pennington PA-C      ** Please Note: Dictation voice to text software may have been used in the creation of this document   **

## 2021-07-26 NOTE — PROGRESS NOTES
07/26/21 0900   Activity/Group Checklist   Group Community meeting   Attendance Attended   Attendance Duration (min) 16-30   Interactions Interacted appropriately   Affect/Mood Appropriate;Normal range   Goals Achieved Identified feelings; Able to listen to others; Able to engage in interactions

## 2021-07-26 NOTE — PLAN OF CARE
Problem: Alteration in Thoughts and Perception  Goal: Verbalize thoughts and feelings  Description: Interventions:  - Promote a nonjudgmental and trusting relationship with the patient through active listening and therapeutic communication  - Assess patient's level of functioning, behavior and potential for risk  - Engage patient in 1 on 1 interactions  - Encourage patient to express fears, feelings, frustrations, and discuss symptoms    - [place] patient to reality, help patient recognize reality-based thinking   - Administer medications as ordered and assess for potential side effects  - Provide the patient education related to the signs and symptoms of the illness and desired effects of prescribed medications  Outcome: Progressing  Goal: Attend and participate in unit activities, including therapeutic, recreational, and educational groups  Description: Interventions:  -Encourage Visitation and family involvement in care  Outcome: Progressing     Problem: Ineffective Coping  Goal: Identifies healthy coping skills  Outcome: Progressing  Patient completed Goal Card for the week of 7/26/21    Goals: Exercise              Pope and meditate              Read and learn

## 2021-07-26 NOTE — PLAN OF CARE
Problem: Alteration in Thoughts and Perception  Goal: Verbalize thoughts and feelings  Description: Interventions:  - Promote a nonjudgmental and trusting relationship with the patient through active listening and therapeutic communication  - Assess patient's level of functioning, behavior and potential for risk  - Engage patient in 1 on 1 interactions  - Encourage patient to express fears, feelings, frustrations, and discuss symptoms    - [place] patient to reality, help patient recognize reality-based thinking   - Administer medications as ordered and assess for potential side effects  - Provide the patient education related to the signs and symptoms of the illness and desired effects of prescribed medications  Outcome: Progressing  Goal: Refrain from acting on delusional thinking/internal stimuli  Description: Interventions:  - Monitor patient closely, per order   - Utilize least restrictive measures   - Set reasonable limits, give positive feedback for acceptable   - Administer medications as ordered and monitor of potential side effects  Outcome: Progressing  Goal: Agree to be compliant with medication regime, as prescribed and report medication side effects  Description: Interventions:  - Offer appropriate PRN medication and supervise ingestion; conduct AIMS, as needed   Outcome: Progressing  Goal: Attend and participate in unit activities, including therapeutic, recreational, and educational groups  Description: Interventions:  -Encourage Visitation and family involvement in care  Outcome: Progressing  Goal: Complete daily ADLs, including personal hygiene independently, as able  Description: Interventions:  - Observe, teach, and assist patient with ADLS  - Monitor and promote a balance of rest/activity, with adequate nutrition and elimination   Outcome: Progressing     Problem: Ineffective Coping  Goal: Cooperates with admission process  Description: Interventions:   - Complete admission process  Outcome: Progressing  Goal: Identifies ineffective coping skills  Outcome: Progressing  Goal: Identifies healthy coping skills  Outcome: Progressing  Goal: Demonstrates healthy coping skills  Outcome: Progressing  Goal: Participates in unit activities  Description: Interventions:  - Provide therapeutic environment   - Provide required programming   - Redirect inappropriate behaviors   Outcome: Progressing  Goal: Patient/Family participate in treatment and DC plans  Description: Interventions:  - Provide therapeutic environment  Outcome: Progressing  Goal: Patient/Family verbalizes awareness of resources  Outcome: Progressing  Goal: Understands least restrictive measures  Description: Interventions:  - Utilize least restrictive behavior  Outcome: Progressing  Goal: Free from restraint events  Description: - Utilize least restrictive measures   - Provide behavioral interventions   - Redirect inappropriate behaviors   Outcome: Progressing     Problem: Risk for Self Injury/Neglect  Goal: Treatment Goal: Remain safe during length of stay, learn and adopt new coping skills, and be free of self-injurious ideation, impulses and acts at the time of discharge  Outcome: Progressing  Goal: Verbalize thoughts and feelings  Description: Interventions:  - Assess and re-assess patient's lethality and potential for self-injury  - Engage patient in 1:1 interactions, daily, for a minimum of 15 minutes  - Encourage patient to express feelings, fears, frustrations, hopes  - Establish rapport/trust with patient   Outcome: Progressing  Goal: Refrain from harming self  Description: Interventions:  - Monitor patient closely, per order  - Develop a trusting relationship  - Supervise medication ingestion, monitor effects and side effects   Outcome: Progressing  Goal: Attend and participate in unit activities, including therapeutic, recreational, and educational groups  Description: Interventions:  - Provide therapeutic and educational activities daily, encourage attendance and participation, and document same in the medical record  - Obtain collateral information, encourage visitation and family involvement in care   Outcome: Progressing  Goal: Complete daily ADLs, including personal hygiene independently, as able  Description: Interventions:  - Observe, teach, and assist patient with ADLS  - Monitor and promote a balance of rest/activity, with adequate nutrition and elimination  Outcome: Progressing     Problem: Depression  Goal: Treatment Goal: Demonstrate behavioral control of depressive symptoms, verbalize feelings of improved mood/affect, and adopt new coping skills prior to discharge  Outcome: Progressing  Goal: Verbalize thoughts and feelings  Description: Interventions:  - Assess and re-assess patient's level of risk   - Engage patient in 1:1 interactions, daily, for a minimum of 15 minutes   - Encourage patient to express feelings, fears, frustrations, hopes   Outcome: Progressing  Goal: Refrain from harming self  Description: Interventions:  - Monitor patient closely, per order   - Supervise medication ingestion, monitor effects and side effects   Outcome: Progressing  Goal: Refrain from isolation  Description: Interventions:  - Develop a trusting relationship   - Encourage socialization   Outcome: Progressing  Goal: Refrain from self-neglect  Outcome: Progressing  Goal: Attend and participate in unit activities, including therapeutic, recreational, and educational groups  Description: Interventions:  - Provide therapeutic and educational activities daily, encourage attendance and participation, and document same in the medical record   Outcome: Progressing  Goal: Complete daily ADLs, including personal hygiene independently, as able  Description: Interventions:  - Observe, teach, and assist patient with ADLS  -  Monitor and promote a balance of rest/activity, with adequate nutrition and elimination   Outcome: Progressing     Problem: Anxiety  Goal: Anxiety is at manageable level  Description: Interventions:  - Assess and monitor patient's anxiety level  - Monitor for signs and symptoms (heart palpitations, chest pain, shortness of breath, headaches, nausea, feeling jumpy, restlessness, irritable, apprehensive)  - Collaborate with interdisciplinary team and initiate plan and interventions as ordered    - [place] patient to unit/surroundings  - Explain treatment plan  - Encourage participation in care  - Encourage verbalization of concerns/fears  - Identify coping mechanisms  - Assist in developing anxiety-reducing skills  - Administer/offer alternative therapies  - Limit or eliminate stimulants  Outcome: Progressing     Problem: Risk for Violence/Aggression Toward Others  Goal: Treatment Goal: Refrain from acts of violence/aggression during length of stay, and demonstrate improved impulse control at the time of discharge  Outcome: Progressing  Goal: Verbalize thoughts and feelings  Description: Interventions:  - Assess and re-assess patient's level of risk, every waking shift  - Engage patient in 1:1 interactions, daily, for a minimum of 15 minutes   - Allow patient to express feelings and frustrations in a safe and non-threatening manner   - Establish rapport/trust with patient   Outcome: Progressing  Goal: Refrain from harming others  Outcome: Progressing  Goal: Refrain from destructive acts on the environment or property  Outcome: Progressing  Goal: Attend and participate in unit activities, including therapeutic, recreational, and educational groups  Description: Interventions:  - Provide therapeutic and educational activities daily, encourage attendance and participation, and document same in the medical record   Outcome: Progressing  Goal: Identify appropriate positive anger management techniques  Description: Interventions:  - Offer anger management and coping skills groups   - Staff will provide positive feedback for appropriate anger control  Outcome: Progressing     Problem: Alteration in Orientation  Goal: Treatment Goal: Demonstrate a reduction of confusion and improved orientation to person, place, time and/or situation upon discharge, according to optimum baseline/ability  Outcome: Progressing  Goal: Interact with staff daily  Description: Interventions:  - Assess and re-assess patient's level of orientation  - Engage patient in 1 on 1 interactions, daily, for a minimum of 15 minutes   - Establish rapport/trust with patient   Outcome: Progressing  Goal: Express concerns related to confused thinking related to:  Description: Interventions:  - Encourage patient to express feelings, fears, frustrations, hopes  - Assign consistent caregivers   - [place]/re-orient patient as needed  - Allow comfort items, as appropriate  - Provide visual cues, signs, etc    Outcome: Progressing  Goal: Allow medical examinations, as recommended  Description: Interventions:  - Provide physical/neurological exams and/or referrals, per provider   Outcome: Progressing  Goal: Cooperate with recommended testing/procedures  Description: Interventions:  - Determine need for ancillary testing  - Observe for mental status changes  - Implement falls/precaution protocol   Outcome: Progressing  Goal: Attend and participate in unit activities, including therapeutic, recreational, and educational groups  Description: Interventions:  - Provide therapeutic and educational activities daily, encourage attendance and participation, and document same in the medical record   - Provide appropriate opportunities for reminiscence   - Provide a consistent daily routine   - Encourage family contact/visitation   Outcome: Progressing  Goal: Complete daily ADLs, including personal hygiene independently, as able  Description: Interventions:  - Observe, teach, and assist patient with ADLS  Outcome: Progressing     Problem: Individualized Interventions  Goal: Patient will verbalize appropriate use of telephone within 5 days  Description: Interventions:  - Treatment team to determine use of supervised phone privileges   Outcome: Progressing  Goal: Patient will verbalize need for hospitalization and will no longer attempt elopement within 5 days  Description: Interventions:  - Ongoing education to help patient understand need for hospitalization  Outcome: Progressing  Goal: Patient will recognize inappropriate behaviors and develop alternative behaviors within 5 days  Description: Interventions:  - Patient in collaboration with Treatment Team will develop a behavior management plan to help identify effective coping skills to deal with stressors  Outcome: Progressing     Problem: Alteration in Thoughts and Perception  Goal: Treatment Goal: Gain control of psychotic behaviors/thinking, reduce/eliminate presenting symptoms and demonstrate improved reality functioning upon discharge  Outcome: Not Progressing  Goal: Recognize dysfunctional thoughts, communicate reality-based thoughts at the time of discharge  Description: Interventions:  - Provide medication and psycho-education to assist patient in compliance and developing insight into his/her illness   Outcome: Not Progressing     Problem: Risk for Self Injury/Neglect  Goal: Recognize maladaptive responses and adopt new coping mechanisms  Outcome: Not Progressing     Problem: Risk for Violence/Aggression Toward Others  Goal: Control angry outbursts  Description: Interventions:  - Monitor patient closely, per order  - Ensure early verbal de-escalation  - Monitor prn medication needs  - Set reasonable/therapeutic limits, outline behavioral expectations, and consequences   - Provide a non-threatening milieu, utilizing the least restrictive interventions   Outcome: Not Progressing

## 2021-07-26 NOTE — NURSING NOTE
Patient is requesting "a  tetanus shot" as she states "my muscles are twitching and I walked this past summer and stepped on things"  She stated she is anxious about her upcoming discharge but added "I can do this"  MD visited for a "medical Clearance" for discharge  At  she requested/received Volteran gel for the soles of each foot   No episodes of yelling; no behavioral issues needing redirection

## 2021-07-26 NOTE — PROGRESS NOTES
Psychiatry Progress Note [facility] 49 y o  female MRN: [id number]  Unit/Bed#: [facility] 672-94 Encounter: [id number]  Code Status: Level 1 - Full Code    PCP: Jaylyn Perez DO    Date of Admission:  4/7/2021 1435   Date of Service:  07/26/21    Patient Active Problem List   Diagnosis    Schizoaffective disorder, bipolar type ([facility] )    Uncomplicated alcohol dependence ([facility] )    Suicidal behavior    LYNN (generalized anxiety disorder)    Slow transit constipation    Deficiency of vitamin B    Degenerative disc disease, lumbar    Post-traumatic stress disorder, chronic    Lower extremity weakness    Generalized abdominal pain    Non-intractable vomiting    Medical clearance for psychiatric admission    Carbuncle    Alcohol dependence with unspecified alcohol-induced disorder ([facility] )    Mild intermittent asthma without complication    Tobacco abuse    Ear problem, bilateral    Gastroesophageal reflux disease    Right foot pain    Ear problem, right    Injury of head    Acute sinusitis    Chlamydial cervicitis    Otitis media     Review of systems:     Needed Robaxin and Voltaren gel but otherwise unremarkable  Diagnosis:           Schizoaffective bipolar, alcohol use disorder in early remission in controlled environment  Assessment   Overall Status:    Was yelling and screaming a few times over the weekend but only managed with no relapse of overt hallucinations or delusions according to her   Certification Statement: The patient will continue to require additional inpatient hospital stay due to ongoing mood swings paranoia   Acceptance by patient:  accepting    Hopefulness in recovery:  Living in a group home   Understanding of medications: has good understanding   Involved in reintegration process:  Adjusting to the unit   Trusting in relationship with psychiatrist:  trusting   Justification for dual anti-psychotics: due to lack of response to single antipsychotics   Side effects from treatment: none    Medication changes    none today  Medication Education; Risks and S/E and precautions of all meds given to patient and she did verbalize an understanding   Non-pharmacological treatments   Continue with individual, group, milieu and occupational therapy using recovery principles and psycho-education about accepting illness and the need for treatment  Safety   Safety and communication plan established to target dynamic risk factors discussed above      Discharge Plan     most likely to a group home with the act team again     Interval Progress      Was screaming again over the weekend was certain situations but not because of voices and has not voiced any overt delusions about TB sending messages or people doing hypnosis or stalking her etc   She fixated about intake the act team today and hoping for discharge in the near future to the [facility] group home in the [place]     Sleep:                                     Good   appetite:                                     good   compliance with medications :          good   Side  Effects:                                        None report   significant events:                                  No delusions but occasional screaming                              Group attendance:  Attending most of the groups    Mental Status Exam  Appearance: age appropriate, casually dressed, looks older than stated age, overweight   Cooperative found being interviewed for intake with Marlene López from [facility] act team  Behavior: normal, pleasant, cooperative, appears anxious, mildly anxious,  more friendly today and not confrontational   Well groomed well kept relating well   Speech: fluent, clear, coherent, increased rate, pressured, hypertalkative,   Circumstantial pressured    Mood: depressed, anxious, euphoric  Affect: labile, reactive, slightly brighter    Thought Process: normal abstract reasoning, less prominent, tends to be pressured and circumstantial and preoccupied perseverating on discharge    Thought Content: some paranoia, ideas of reference, but does appear as if paranoid  No current suicidal homicidal thoughts intent or plans verbalized  No phobias obsessions compulsions or distorted body perceptions elicited     Does not talk about the TV sending messages or the feds after her  or people doing hypnosis or people stalking her  Perceptual Disturbances: no auditory hallucinations, no visual hallucinations, denies auditory hallucinations when asked, does not appear responding to internal stimuli, no voices reported today       No voices reported  Hx Risk Factors: chronic mood disorder, chronic psychotic symptoms, alcohol use, limted insight  Sensorium:           Oriented to 3 spheres and to situation  Cognition: recent and remote memory grossly intact  Consciousness: alert and awake  Attention: attention span and concentration are age appropriate  Intellect: appears to be of average intelligence  Insight: improving  Judgement: improving  Motor Activity: no abnormal movements     Vitals  Temp:  [97 5 °F (36 4 °C)-97 7 °F (36 5 °C)] 97 5 °F (36 4 °C)  HR:  [72-97] 97  Resp:  [15-18] 15  BP: (102-137)/(76-96) 131/96  No intake or output data in the 24 hours ending 07/26/21 0540    Lab Results: No New Labs Available For Today     Current Facility-Administered Medications   Medication Dose Route Frequency Provider Last Rate    acetaminophen  650 mg Oral Q6H PRN Mariel Philip MD      acetaminophen  650 mg Oral Q4H PRN Mariel Philip MD      acetaminophen  975 mg Oral Q6H PRN Mariel Philip MD      al mag oxide-diphenhydramine-lidocaine viscous  10 mL Swish & Swallow Q4H PRN Mariel Philip MD      albuterol  2 puff Inhalation Q6H PRN Mariel Philip MD      aluminum-magnesium hydroxide-simethicone  15 mL Oral Q4H PRN Mariel Philip MD      amoxicillin-clavulanate  1 tablet Oral Q12H [facility] Juliane Lee PA-C      benztropine  1 mg Intramuscular Q4H PRN Max 6/day Charlie Smith MD      benztropine  1 mg Oral Q4H PRN Max 6/day Charlie Smith MD      benztropine  1 mg Oral BID Charlie Smith MD      calcium carbonate  500 mg Oral TID PRN Charlie Smith MD      Diclofenac Sodium  2 g Topical 4x Daily PRN Charlie Smith MD      diphenhydrAMINE  25 mg Oral HS Charlie Smith MD      fenofibrate  145 mg Oral Daily Charlie Smith MD      gabapentin  600 mg Oral 4x Daily Charlie Smith MD      haloperidol  1 mg Oral 4x Daily Charlie Smith MD      haloperidol  5 mg Oral Q6H PRN Charlie Smith MD      hydrOXYzine HCL  25 mg Oral Q6H PRN Max 4/day Charlie Smith MD      hydrOXYzine HCL  50 mg Oral Q6H PRN Max 4/day Charlie Smith MD      lidocaine  1 patch Topical Daily Charlie Smith MD      LORazepam  1 mg Intramuscular Q6H PRN Charlie Smith MD      LORazepam  0 5 mg Oral 4x Daily Charlie Smith MD      magnesium hydroxide  30 mL Oral Daily PRN Charlie Smith MD      methocarbamol  500 mg Oral Q8H PRN Charlie Smith MD      nicotine  1 patch Transdermal Daily Charlie Smith MD      nicotine polacrilex  4 mg Oral Q2H PRN Charlie Smith MD      OLANZapine  2 5 mg Oral Q8H PRN Charlie Smith MD      OLANZapine  5 mg Oral Q3H PRN Charlie Smith MD      OLANZapine  7 5 mg Oral Q3H PRN Charlie Smith MD      OLANZapine  10 mg Intramuscular Q3H PRN Charlie Smith MD      OLANZapine  5 mg Intramuscular Q3H PRN Charlie Smith MD      OLANZapine  10 mg Oral HS Charlie Smith MD      OLANZapine  15 mg Oral Daily Charlie Smith MD      paliperidone palmitate ER  234 mg Intramuscular Q30 Days Charlie Smith MD      pantoprazole  40 mg Oral Early Morning Charlie Smith MD      Pramox-PE-Glycerin-Petrolatum  1 application Rectal 4x Daily PRN Charlie Smith MD      prazosin  1 mg Oral HS Charlie Smith MD      psyllium  1 packet Oral Daily Charlie Smith MD      sertraline  100 mg Oral Daily Charlie Smith MD         Counseling / Coordination of Care: Total floor / unit time spent today 15 minutes  Greater than 50% of total time was spent with the patient and / or family counseling and / or somewhat receptive to supportive listening and teaching positive coping skills to deal with symptom mangement  Patient's Rights, confidentiality and exceptions to confidentiality, use of automated medical record, [facility]ie At[facility] staff access to medical record, and consent to treatment reviewed  This note was  not shared with the patient because it might further aggravate her psychiatric condition  This note has been dictated

## 2021-07-26 NOTE — SOCIAL WORK
Pt participated in her intake with Jessenia Hollins from [facility] today, appeared to go well  Pt has her CSP scheduled for tomorrow at 10AM and a DC plan for Thursday, though SW is awaiting confirmation of time / mode of transportation  Requested for psychiatrist to send scripts to OP pharmacy for month supply  Focused on preparing for CSP meeting and completing discharge tasks

## 2021-07-26 NOTE — PROGRESS NOTES
Patient approached tiffanyr at [phone] and requested a face to face meeting  Patient appeared to be anxious and concerned about her CSP meeting tomorrow at [place] [id number]  Patient claimed she was unsure what she had to do and what questions she needed to answer  Writer attempted to explain the meeting is a simple form the Team and outside provides will be going over to ensure every thing is in order  Writer showed Patient the CSP form and she did remember filling out her part  Seems Patient was under the impression she would have to know details of her WRAP Plan and Relapse Prevention plan but this writer reassured her she did not and they would be reading from the CSP form itself  Patient then wanted to end the meeting stating, "I can't concentrate even on what you are saying " Writer again, reassured her the meeting will be short and easy  Patient left tiffanyr's office and screamed in the hallway  A few seconds she was receiving a phone call, redirected herself and was able to talk on the phone to her caller

## 2021-07-26 NOTE — NURSING NOTE
Pt is med and meal compliant  She denied any depression and anxiety stating, "I am good " visible intermittently on the milieu  Social with select peers  Denies any SI or HI  COVID test done and sent to lab  No behavioral issues

## 2021-07-27 PROCEDURE — 99232 SBSQ HOSP IP/OBS MODERATE 35: CPT | Performed by: PSYCHIATRY & NEUROLOGY

## 2021-07-27 RX ORDER — LIDOCAINE 50 MG/G
1 PATCH TOPICAL DAILY
Qty: 30 PATCH | Refills: 0 | Status: SHIPPED | OUTPATIENT
Start: 2021-07-27 | End: 2021-08-13 | Stop reason: SDUPTHER

## 2021-07-27 RX ORDER — ALBUTEROL SULFATE 2.5 MG/3ML
2.5 SOLUTION RESPIRATORY (INHALATION) EVERY 6 HOURS PRN
Qty: 30 ML | Refills: 0 | Status: SHIPPED | OUTPATIENT
Start: 2021-07-27 | End: 2022-01-18 | Stop reason: SDUPTHER

## 2021-07-27 RX ORDER — OLANZAPINE 10 MG/1
10 TABLET ORAL
Qty: 30 TABLET | Refills: 0 | Status: SHIPPED | OUTPATIENT
Start: 2021-07-27 | End: 2022-03-28 | Stop reason: ALTCHOICE

## 2021-07-27 RX ORDER — FENOFIBRATE 145 MG/1
145 TABLET, COATED ORAL DAILY
Qty: 30 TABLET | Refills: 0 | Status: SHIPPED | OUTPATIENT
Start: 2021-07-27 | End: 2021-08-13 | Stop reason: SDUPTHER

## 2021-07-27 RX ORDER — LANOLIN ALCOHOL/MO/W.PET/CERES
100 CREAM (GRAM) TOPICAL DAILY
Qty: 30 TABLET | Refills: 0 | Status: SHIPPED | OUTPATIENT
Start: 2021-07-27 | End: 2021-08-23 | Stop reason: ALTCHOICE

## 2021-07-27 RX ORDER — DIPHENHYDRAMINE HCL 25 MG
25 TABLET ORAL
Qty: 30 TABLET | Refills: 0 | Status: SHIPPED | OUTPATIENT
Start: 2021-07-27 | End: 2021-08-13 | Stop reason: SDUPTHER

## 2021-07-27 RX ORDER — PANTOPRAZOLE SODIUM 40 MG/1
40 TABLET, DELAYED RELEASE ORAL
Qty: 30 TABLET | Refills: 0 | Status: SHIPPED | OUTPATIENT
Start: 2021-07-28 | End: 2021-08-13 | Stop reason: SDUPTHER

## 2021-07-27 RX ORDER — SERTRALINE HYDROCHLORIDE 100 MG/1
100 TABLET, FILM COATED ORAL DAILY
Qty: 30 TABLET | Refills: 0 | Status: SHIPPED | OUTPATIENT
Start: 2021-07-27 | End: 2022-03-28 | Stop reason: ALTCHOICE

## 2021-07-27 RX ORDER — HALOPERIDOL 1 MG/1
1 TABLET ORAL 4 TIMES DAILY
Qty: 120 TABLET | Refills: 0 | Status: SHIPPED | OUTPATIENT
Start: 2021-07-27 | End: 2022-03-28 | Stop reason: ALTCHOICE

## 2021-07-27 RX ORDER — ACETAMINOPHEN 325 MG/1
650 TABLET ORAL EVERY 6 HOURS PRN
Qty: 30 TABLET | Refills: 0 | Status: SHIPPED | OUTPATIENT
Start: 2021-07-27 | End: 2022-03-28 | Stop reason: SDUPTHER

## 2021-07-27 RX ORDER — PRAZOSIN HYDROCHLORIDE 1 MG/1
1 CAPSULE ORAL
Qty: 30 CAPSULE | Refills: 0 | Status: SHIPPED | OUTPATIENT
Start: 2021-07-27 | End: 2021-08-13 | Stop reason: SDUPTHER

## 2021-07-27 RX ORDER — METHOCARBAMOL 500 MG/1
500 TABLET, FILM COATED ORAL EVERY 8 HOURS PRN
Qty: 45 TABLET | Refills: 0 | Status: SHIPPED | OUTPATIENT
Start: 2021-07-27 | End: 2021-08-18

## 2021-07-27 RX ORDER — LORAZEPAM 0.5 MG/1
0.5 TABLET ORAL 4 TIMES DAILY
Qty: 40 TABLET | Refills: 0 | Status: SHIPPED | OUTPATIENT
Start: 2021-07-27 | End: 2022-07-22

## 2021-07-27 RX ORDER — OLANZAPINE 5 MG/1
5 TABLET, ORALLY DISINTEGRATING ORAL
Qty: 30 TABLET | Refills: 0 | Status: SHIPPED | OUTPATIENT
Start: 2021-07-27 | End: 2021-07-28 | Stop reason: HOSPADM

## 2021-07-27 RX ORDER — GABAPENTIN 300 MG/1
600 CAPSULE ORAL 4 TIMES DAILY
Qty: 120 CAPSULE | Refills: 0 | Status: SHIPPED | OUTPATIENT
Start: 2021-07-27

## 2021-07-27 RX ORDER — HYDROXYZINE HYDROCHLORIDE 25 MG/1
25 TABLET, FILM COATED ORAL EVERY 6 HOURS PRN
Qty: 30 TABLET | Refills: 0 | Status: SHIPPED | OUTPATIENT
Start: 2021-07-27 | End: 2022-07-22 | Stop reason: ALTCHOICE

## 2021-07-27 RX ORDER — OLANZAPINE 15 MG/1
15 TABLET ORAL DAILY
Qty: 30 TABLET | Refills: 0 | Status: SHIPPED | OUTPATIENT
Start: 2021-07-27 | End: 2022-03-28 | Stop reason: ALTCHOICE

## 2021-07-27 RX ORDER — FLUTICASONE PROPIONATE 50 MCG
1 SPRAY, SUSPENSION (ML) NASAL DAILY
Qty: 1 G | Refills: 0 | Status: CANCELLED | OUTPATIENT
Start: 2021-07-27

## 2021-07-27 RX ORDER — BENZTROPINE MESYLATE 1 MG/1
1 TABLET ORAL 2 TIMES DAILY
Qty: 60 TABLET | Refills: 0 | Status: SHIPPED | OUTPATIENT
Start: 2021-07-27

## 2021-07-27 RX ORDER — ALBUTEROL SULFATE 90 UG/1
2 AEROSOL, METERED RESPIRATORY (INHALATION) EVERY 6 HOURS PRN
Qty: 1 G | Refills: 0 | Status: SHIPPED | OUTPATIENT
Start: 2021-07-27 | End: 2022-01-18 | Stop reason: SDUPTHER

## 2021-07-27 RX ADMIN — LORAZEPAM 0.5 MG: 0.5 TABLET ORAL at 11:45

## 2021-07-27 RX ADMIN — LIDOCAINE 1 PATCH: 50 PATCH CUTANEOUS at 08:27

## 2021-07-27 RX ADMIN — PHENYTOIN 1 MG: 125 SUSPENSION ORAL at 21:14

## 2021-07-27 RX ADMIN — DICLOFENAC SODIUM 2 G: 10 GEL TOPICAL at 16:38

## 2021-07-27 RX ADMIN — PANTOPRAZOLE SODIUM 40 MG: 40 TABLET, DELAYED RELEASE ORAL at 06:03

## 2021-07-27 RX ADMIN — FENOFIBRATE 145 MG: 145 TABLET ORAL at 08:27

## 2021-07-27 RX ADMIN — GABAPENTIN 600 MG: 300 CAPSULE ORAL at 11:45

## 2021-07-27 RX ADMIN — GABAPENTIN 600 MG: 300 CAPSULE ORAL at 17:03

## 2021-07-27 RX ADMIN — HALOPERIDOL 1 MG: 1 TABLET ORAL at 08:27

## 2021-07-27 RX ADMIN — LORAZEPAM 0.5 MG: 0.5 TABLET ORAL at 21:14

## 2021-07-27 RX ADMIN — GABAPENTIN 600 MG: 300 CAPSULE ORAL at 08:27

## 2021-07-27 RX ADMIN — PSYLLIUM HUSK 1 PACKET: 3.4 POWDER ORAL at 08:26

## 2021-07-27 RX ADMIN — METHOCARBAMOL TABLETS 500 MG: 500 TABLET, COATED ORAL at 16:37

## 2021-07-27 RX ADMIN — SERTRALINE HYDROCHLORIDE 100 MG: 100 TABLET ORAL at 08:27

## 2021-07-27 RX ADMIN — BENZTROPINE MESYLATE 1 MG: 1 TABLET ORAL at 08:27

## 2021-07-27 RX ADMIN — DIPHENHYDRAMINE HCL 25 MG: 25 TABLET ORAL at 21:14

## 2021-07-27 RX ADMIN — LORAZEPAM 0.5 MG: 0.5 TABLET ORAL at 17:03

## 2021-07-27 RX ADMIN — HALOPERIDOL 1 MG: 1 TABLET ORAL at 11:45

## 2021-07-27 RX ADMIN — BENZTROPINE MESYLATE 1 MG: 1 TABLET ORAL at 17:03

## 2021-07-27 RX ADMIN — GABAPENTIN 600 MG: 300 CAPSULE ORAL at 21:14

## 2021-07-27 RX ADMIN — NICOTINE 1 PATCH: 21 PATCH, EXTENDED RELEASE TRANSDERMAL at 08:27

## 2021-07-27 RX ADMIN — OLANZAPINE 10 MG: 10 TABLET, FILM COATED ORAL at 21:14

## 2021-07-27 RX ADMIN — OLANZAPINE 15 MG: 5 TABLET, FILM COATED ORAL at 08:27

## 2021-07-27 RX ADMIN — LORAZEPAM 0.5 MG: 0.5 TABLET ORAL at 08:27

## 2021-07-27 RX ADMIN — HALOPERIDOL 1 MG: 1 TABLET ORAL at 17:03

## 2021-07-27 RX ADMIN — HALOPERIDOL 1 MG: 1 TABLET ORAL at 21:14

## 2021-07-27 NOTE — PROGRESS NOTES
Psychiatry Progress Note [facility] 49 y o  female MRN: [id number]  Unit/Bed#: [facility] L[facility] 902-67 Encounter: [id number]  Code Status: Level 1 - Full Code    PCP: Slime Valdez DO    Date of Admission:  4/7/2021 1435   Date of Service:  07/27/21    Patient Active Problem List   Diagnosis    Schizoaffective disorder, bipolar type ([facility] )    Uncomplicated alcohol dependence ([facility] )    Suicidal behavior    LYNN (generalized anxiety disorder)    Slow transit constipation    Deficiency of vitamin B    Degenerative disc disease, lumbar    Post-traumatic stress disorder, chronic    Lower extremity weakness    Generalized abdominal pain    Non-intractable vomiting    Medical clearance for psychiatric admission    Carbuncle    Alcohol dependence with unspecified alcohol-induced disorder ([facility] )    Mild intermittent asthma without complication    Tobacco abuse    Ear problem, bilateral    Gastroesophageal reflux disease    Right foot pain    Ear problem, right    Injury of head    Acute sinusitis    Chlamydial cervicitis    Otitis media     Review of systems:   Had medical check on her for discharge clearance  Diagnosis:           Schizoaffective bipolar, alcohol use disorder in early remission in controlled environment  Assessment   Overall Status:    Was yelling and screaming a few times over the weekend but only managed with no relapse of overt hallucinations or delusions according to her   Certification Statement: The patient will continue to require additional inpatient hospital stay due to ongoing mood swings paranoia   Acceptance by patient:  accepting    Hopefulness in recovery:  Living in a group home   Understanding of medications: has good understanding   Involved in reintegration process:  Adjusting to the unit   Trusting in relationship with psychiatrist:  trusting   Justification for dual anti-psychotics: due to lack of response to single antipsychotics   Side effects from treatment: none    Medication changes    none today  Medication Education; Risks and S/E and precautions of all meds given to patient and she did verbalize an understanding   Non-pharmacological treatments   Continue with individual, group, milieu and occupational therapy using recovery principles and psycho-education about accepting illness and the need for treatment  Safety   Safety and communication plan established to target dynamic risk factors discussed above  Discharge Plan     most likely to a group home with the act team again     Interval Progress       Patient did attend  CSP meeting today as he had interview with his beard tapped him yesterday and is looking forward to getting out in 2 days to live with [facility]  She is not screaming lately and has not needed any p r n  meds and is no longer expressing any overt delusions about T sending messages her people doing hypnosis or stalking her    She claims she has not heard the voices in a while and is learning positive coping skills   Sleep:                                     Good   appetite:                                  good   compliance with medications :         good   Side  Effects:                                         None reported   significant events:                                   No delusions elicited   And no screaming episodes   Group attendance:   Attending most of the groups    Mental Status Exam  Appearance: age appropriate, casually dressed, looks older than stated age, overweight   excited about the csp meeting today   Behavior: normal, pleasant, cooperative, appears anxious, mildly anxious,  more friendly today and not confrontational   Well kept and groomed , happy about discharge in two days    Speech: fluent, clear, coherent, increased rate, pressured, hypertalkative,   Circumstantial pressured    Mood: depressed, anxious, euphoric  Affect: labile, reactive, slightly brighter    Thought Process: normal abstract reasoning, less prominent, tends to be pressured and circumstantial and preoccupied perseverating on discharge    Thought Content: some paranoia, ideas of reference, but does appear as if paranoid  No current suicidal homicidal thoughts intent or plans verbalized  No phobias obsessions compulsions or distorted body perceptions elicited    no longer talks about Tv sending messages or people stalking her or the feds after her   Perceptual Disturbances: no auditory hallucinations, no visual hallucinations, denies auditory hallucinations when asked, does not appear responding to internal stimuli, no voices reported today       No voices reported  Hx Risk Factors: chronic mood disorder, chronic psychotic symptoms, alcohol use, limted insight  Sensorium:           Oriented to 3 spheres and to situation  Cognition: recent and remote memory grossly intact  Consciousness: alert and awake  Attention: attention span and concentration are age appropriate  Intellect: appears to be of average intelligence  Insight: improving  Judgement: improving  Motor Activity: no abnormal movements     Vitals  Temp:  [97 2 °F (36 2 °C)-97 5 °F (36 4 °C)] 97 5 °F (36 4 °C)  HR:  [89-91] 90  Resp:  [17-18] 18  BP: (105-117)/(61-75) 117/67  No intake or output data in the 24 hours ending 07/27/21 0801    Lab Results: No New Labs Available For Today     Current Facility-Administered Medications   Medication Dose Route Frequency Provider Last Rate    acetaminophen  650 mg Oral Q6H PRN Jenna Lind MD      acetaminophen  650 mg Oral Q4H PRN Jenna Lind MD      acetaminophen  975 mg Oral Q6H PRN Jenna Lind MD      al mag oxide-diphenhydramine-lidocaine viscous  10 mL Swish & Swallow Q4H PRN Jenna Lind MD      albuterol  2 puff Inhalation Q6H PRN Jenna Lind MD      aluminum-magnesium hydroxide-simethicone  15 mL Oral Q4H PRN Jenna Lind MD      benztropine  1 mg Intramuscular Q4H PRN Max 6/day Jayne Chapman MD      benztropine  1 mg Oral Q4H PRN Max 6/day Jayne Chapman MD      benztropine  1 mg Oral BID Jayne Chapman MD      calcium carbonate  500 mg Oral TID PRN Jayne Chapman MD      Diclofenac Sodium  2 g Topical 4x Daily PRN Jayne Chapman MD      diphenhydrAMINE  25 mg Oral HS Jayne Chapman, MD      fenofibrate  145 mg Oral Daily Jayne Chapman MD      gabapentin  600 mg Oral 4x Daily Jayne Chapman MD      haloperidol  1 mg Oral 4x Daily Jayne Chapman MD      haloperidol  5 mg Oral Q6H PRN Jayne Chapman, MD      hydrOXYzine HCL  25 mg Oral Q6H PRN Max 4/day Jayne Chapman MD      hydrOXYzine HCL  50 mg Oral Q6H PRN Max 4/day Jayne Chapman MD      lidocaine  1 patch Topical Daily Jayne Chapman MD      LORazepam  1 mg Intramuscular Q6H PRN Jayne Chapman MD      LORazepam  0 5 mg Oral 4x Daily Jayne Chapman MD      magnesium hydroxide  30 mL Oral Daily PRN Jayne Chapman MD      methocarbamol  500 mg Oral Q8H PRN Jayne Chapman MD      nicotine  1 patch Transdermal Daily Jayne Chapman MD      nicotine polacrilex  4 mg Oral Q2H PRN Jayne Chapman MD      OLANZapine  2 5 mg Oral Q8H PRN Jayne Chapman MD      OLANZapine  5 mg Oral Q3H PRN Jayne Chapman MD      OLANZapine  7 5 mg Oral Q3H PRN Jayne Chapman, MD      OLANZapine  10 mg Intramuscular Q3H PRN Jayne Chapman, MD      OLANZapine  5 mg Intramuscular Q3H PRN Jayne Chapman MD      OLANZapine  10 mg Oral HS Jayne Chapman MD      OLANZapine  15 mg Oral Daily Jayne Chapman MD      paliperidone palmitate ER  234 mg Intramuscular Q30 Days Jayne Chapman MD      pantoprazole  40 mg Oral Early Morning Jayne Chapman, MD      Pramox-PE-Glycerin-Petrolatum  1 application Rectal 4x Daily PRN Jayne Chapman MD      prazosin  1 mg Oral HS Jayne Chapman MD      psyllium  1 packet Oral Daily Jayne Chapman MD      sertraline  100 mg Oral Daily Jayne Chapman MD         Counseling / Coordination of Care: Total floor / unit time spent today 15 minutes   Greater than 50% of total

## 2021-07-27 NOTE — PROGRESS NOTES
Writer touched base with Patient concerning upcoming discharge  Patient was pleasant and aware she was less anxious then yesterday, now that [facility] meeting was completed today  Patient spoke optimistically about the group home and is expecting she will do well there  She is excited about seeing her daughter and reuniting with her grandchildren  Writer continued to encourage her in reaching out for assistance to staff at [facility] and her Act Team  Patient expressed gratitude and hopefulness

## 2021-07-27 NOTE — PLAN OF CARE
Problem: Alteration in Thoughts and Perception  Goal: Treatment Goal: Gain control of psychotic behaviors/thinking, reduce/eliminate presenting symptoms and demonstrate improved reality functioning upon discharge  Outcome: Progressing     Problem: Alteration in Thoughts and Perception  Goal: Agree to be compliant with medication regime, as prescribed and report medication side effects  Description: Interventions:  - Offer appropriate PRN medication and supervise ingestion; conduct AIMS, as needed   Outcome: Progressing     Problem: Alteration in Thoughts and Perception  Goal: Attend and participate in unit activities, including therapeutic, recreational, and educational groups  Description: Interventions:  -Encourage Visitation and family involvement in care  Outcome: Progressing     Problem: Alteration in Thoughts and Perception  Goal: Complete daily ADLs, including personal hygiene independently, as able  Description: Interventions:  - Observe, teach, and assist patient with ADLS  - Monitor and promote a balance of rest/activity, with adequate nutrition and elimination   Outcome: Progressing

## 2021-07-27 NOTE — SOCIAL WORK
CSP meeting held at [place] [address]; Vaishali [place], [facility], Bee Cortez and [facility] participating along with patient, SW and psychiatrist  Meeting was thorough and productive  Decided on discharge Thursday 7/29 ([facility] staff will pick patient up at 9am)  SW will follow up with related DC tasks, finalizing OP plans, sending out clinicals per [address] staff request and picking up medications once they are ready from OP pharmacy

## 2021-07-27 NOTE — NURSING NOTE
Began taking care of Kirsty Quintero at 1900  Patient had no further complaints robaxin and voltaren effective  Consumed 100% of dinner  Attended therapeutic and wrap up group  Continue to monitor

## 2021-07-27 NOTE — NURSING NOTE
Pt is med compliant  Denies any depression, anxiety, pain, SI, and HI  She says "today is a good day " visible on the milieu and social with select peers  No behavioral issues

## 2021-07-27 NOTE — NURSING NOTE
Pt is c/o muscle aches  voltaren and robaxin given at 1638  Pt stated she will "rest for a little so that it kicks in " No behavioral issues

## 2021-07-27 NOTE — PROGRESS NOTES
07/27/21 1100   Activity/Group Checklist   Group   ( Recovery Management )   Attendance Attended   Attendance Duration (min) 46-60   Interactions Interacted appropriately   Affect/Mood Appropriate;Bright;Calm;Normal range   Goals Achieved Identified feelings; Discussed coping strategies; Identified relapse prevention strategies; Able to listen to others

## 2021-07-27 NOTE — PROGRESS NOTES
07/27/21 1151   Team Meeting   Meeting Type Daily Rounds   Initial Conference Date 07/27/21   Patient/Family Present   Patient Present No   Patient's Family Present No       Daily Rounds Documentation  Team Members Participating  MD Sangita Nelson, RN  Yelitza Mcdonald, DECLANW  Kateryna Haile, DECLANW  Surya Atkinson, ANNALISE    Case reviewed  Covid test done (negative) and physical eval completed   will send these results / clinicals to [facility] per their request  CSP at 10am  Anticipate discharge Thursday morning  4/5 groups

## 2021-07-28 PROCEDURE — 99232 SBSQ HOSP IP/OBS MODERATE 35: CPT | Performed by: PSYCHIATRY & NEUROLOGY

## 2021-07-28 RX ORDER — OLANZAPINE 5 MG/1
5 TABLET, ORALLY DISINTEGRATING ORAL EVERY 8 HOURS PRN
Qty: 30 TABLET | Refills: 0 | Status: SHIPPED | OUTPATIENT
Start: 2021-07-28 | End: 2021-07-29 | Stop reason: HOSPADM

## 2021-07-28 RX ORDER — PALIPERIDONE 3 MG/1
TABLET, EXTENDED RELEASE ORAL
Qty: 30 TABLET | Refills: 0 | Status: SHIPPED | OUTPATIENT
Start: 2021-07-28 | End: 2022-03-28 | Stop reason: ALTCHOICE

## 2021-07-28 RX ADMIN — OLANZAPINE 15 MG: 5 TABLET, FILM COATED ORAL at 08:24

## 2021-07-28 RX ADMIN — GABAPENTIN 600 MG: 300 CAPSULE ORAL at 08:23

## 2021-07-28 RX ADMIN — LORAZEPAM 0.5 MG: 0.5 TABLET ORAL at 17:18

## 2021-07-28 RX ADMIN — LORAZEPAM 0.5 MG: 0.5 TABLET ORAL at 21:09

## 2021-07-28 RX ADMIN — BENZTROPINE MESYLATE 1 MG: 1 TABLET ORAL at 17:19

## 2021-07-28 RX ADMIN — LORAZEPAM 0.5 MG: 0.5 TABLET ORAL at 12:06

## 2021-07-28 RX ADMIN — PHENYTOIN 1 MG: 125 SUSPENSION ORAL at 21:09

## 2021-07-28 RX ADMIN — OLANZAPINE 10 MG: 10 TABLET, FILM COATED ORAL at 21:09

## 2021-07-28 RX ADMIN — GABAPENTIN 600 MG: 300 CAPSULE ORAL at 21:09

## 2021-07-28 RX ADMIN — PSYLLIUM HUSK 1 PACKET: 3.4 POWDER ORAL at 08:24

## 2021-07-28 RX ADMIN — HALOPERIDOL 1 MG: 1 TABLET ORAL at 17:18

## 2021-07-28 RX ADMIN — LORAZEPAM 0.5 MG: 0.5 TABLET ORAL at 08:23

## 2021-07-28 RX ADMIN — SERTRALINE HYDROCHLORIDE 100 MG: 100 TABLET ORAL at 08:24

## 2021-07-28 RX ADMIN — HALOPERIDOL 1 MG: 1 TABLET ORAL at 21:10

## 2021-07-28 RX ADMIN — HALOPERIDOL 1 MG: 1 TABLET ORAL at 08:25

## 2021-07-28 RX ADMIN — GABAPENTIN 600 MG: 300 CAPSULE ORAL at 17:18

## 2021-07-28 RX ADMIN — FENOFIBRATE 145 MG: 145 TABLET ORAL at 08:25

## 2021-07-28 RX ADMIN — NICOTINE 1 PATCH: 21 PATCH, EXTENDED RELEASE TRANSDERMAL at 08:27

## 2021-07-28 RX ADMIN — HALOPERIDOL 1 MG: 1 TABLET ORAL at 12:06

## 2021-07-28 RX ADMIN — DIPHENHYDRAMINE HCL 25 MG: 25 TABLET ORAL at 21:09

## 2021-07-28 RX ADMIN — PANTOPRAZOLE SODIUM 40 MG: 40 TABLET, DELAYED RELEASE ORAL at 06:26

## 2021-07-28 RX ADMIN — LIDOCAINE 1 PATCH: 50 PATCH CUTANEOUS at 08:26

## 2021-07-28 RX ADMIN — BENZTROPINE MESYLATE 1 MG: 1 TABLET ORAL at 08:24

## 2021-07-28 RX ADMIN — GABAPENTIN 600 MG: 300 CAPSULE ORAL at 12:06

## 2021-07-28 NOTE — PROGRESS NOTES
07/27/21 1300   Activity/Group Checklist   Group Other (Comment)  (Group Art Therapy/Psychodynamic, Open Discussion)   Attendance Attended   Attendance Duration (min) 46-60   Interactions Interacted appropriately   Affect/Mood Appropriate   Goals Achieved Able to listen to others; Able to engage in interactions  (Did not engage material; socially interactive)

## 2021-07-28 NOTE — PROGRESS NOTES
Psychiatry Progress Note [facility] 49 y o  female MRN: [id number]  Unit/Bed#: [facility] L[facility] 427-68 Encounter: [id number]  Code Status: Level 1 - Full Code    PCP: Guilherme Walker DO    Date of Admission:  4/7/2021 1435   Date of Service:  07/28/21    Patient Active Problem List   Diagnosis    Schizoaffective disorder, bipolar type ([facility] )    Uncomplicated alcohol dependence ([facility] )    Suicidal behavior    LYNN (generalized anxiety disorder)    Slow transit constipation    Deficiency of vitamin B    Degenerative disc disease, lumbar    Post-traumatic stress disorder, chronic    Lower extremity weakness    Generalized abdominal pain    Non-intractable vomiting    Medical clearance for psychiatric admission    Carbuncle    Alcohol dependence with unspecified alcohol-induced disorder ([facility] )    Mild intermittent asthma without complication    Tobacco abuse    Ear problem, bilateral    Gastroesophageal reflux disease    Right foot pain    Ear problem, right    Injury of head    Acute sinusitis    Chlamydial cervicitis    Otitis media     Review of systems:   unremarkable  Diagnosis:        Schizoaffective bipolar type, alcohol use disorder episodic in early remission in controlled environment  Assessment   Overall Status:     Excited about discharge tomorrow on no screaming or yelling with no need for p r n  meds yesterday  Did cooperate with the [facility] meeting yesterday he and is planning to leave at the [facility]  Her mother is supposed to meet with her this weekend and she is hoping to meet with her daughter and grandchildren as well    Not expressing any delusional beliefss lately   Certification Statement: The patient will continue to require additional inpatient hospital stay due to ongoing mood swings paranoia   Acceptance by patient:  accepting    Hopefulness in recovery:  Living in a group home   Understanding of medications: has good understanding   Involved in reintegration process:  Adjusting to the unit   Trusting in relationship with psychiatrist:  trusting   Justification for dual anti-psychotics: due to lack of response to single antipsychotics   Side effects from treatment: none    Medication changes    none today  Medication Education; Risks and S/E and precautions of all meds given to patient and she did verbalize an understanding   Non-pharmacological treatments   Continue with individual, group, milieu and occupational therapy using recovery principles and psycho-education about accepting illness and the need for treatment  Safety   Safety and communication plan established to target dynamic risk factors discussed above  Discharge Plan     most likely to a group home with the act team again     Interval Progress       Patient is anticipating discharge tomorrow and is in good spirits  He she is friendly and pleasant  She is hoping to leave but the [facility] as of tomorrow  Her mood mother is going to visit with her the day after at the group home and she is going to reconcile with her daughter and grandchildren and move on with her life  She is not expressing any delusions about TB standing messages or the feds being after her or people doing hypnosis or stalking her lately  She reports having heard no voices in a while and has not been screaming at all in the last 24 hours requiring no p r n  meds    Planning to work with the drug and alcohol specialist in the act team when she is discharged  Sleep:                                      good   appetite:                                  good   compliance with medications :          good   Side  Effects:                                       None reported   significant events:                               No overt delusions reported no screaming episodes excited about discharge tomorrow   Group attendance:    Attending most of the groups    Mental Status Exam  Appearance: age appropriate, casually dressed, looks older than stated age, overweight    excited about discharge and is brighter in affect well groomed well kept found pacing the hallways and did Greet me with a smile  Behavior: normal, pleasant, cooperative, appears anxious, mildly anxious,  more friendly today and not confrontational    Very well kept and groomed excited about discharge scheduled for tomorrow morning   Speech: fluent, clear, coherent, increased rate, pressured, hypertalkative,   Circumstantial pressured    Mood: depressed, anxious, euphoric  Affect: labile, reactive, slightly brighter    Thought Process: normal abstract reasoning, less prominent, tends to be pressured and circumstantial and preoccupied perseverating on discharge    Thought Content: some paranoia, ideas of reference, but does appear as if paranoid  No current suicidal homicidal thoughts intent or plans verbalized  No phobias obsessions compulsions or distorted body perceptions elicited     No longer verbalizes in a delusions about TV sending messages, about the feds after her, about people stalking her or staff doing hypnosis on her  Perceptual Disturbances: no auditory hallucinations, no visual hallucinations, denies auditory hallucinations when asked, does not appear responding to internal stimuli, no voices reported today       No voices reported  Hx Risk Factors: chronic mood disorder, chronic psychotic symptoms, alcohol use, limted insight  Sensorium:            Oriented to 3 spheres and to situation  Cognition: recent and remote memory grossly intact  Consciousness: alert and awake  Attention: attention span and concentration are age appropriate  Intellect: appears to be of average intelligence  Insight: improving  Judgement: improving  Motor Activity: no abnormal movements     Vitals  Temp:  [97 6 °F (36 4 °C)] 97 6 °F (36 4 °C)  HR:  [] 97  Resp:  [18-19] 19  BP: (111-122)/(72-77) 122/75  No intake or output data in the 24 hours ending 07/28/21 3140    Lab Results: No New Labs Available For Today     Current Facility-Administered Medications   Medication Dose Route Frequency Provider Last Rate    acetaminophen  650 mg Oral Q6H PRN Troy Said, MD      acetaminophen  650 mg Oral Q4H PRN Troy Said, MD      acetaminophen  975 mg Oral Q6H PRN Troy Said, MD      al mag oxide-diphenhydramine-lidocaine viscous  10 mL Swish & Swallow Q4H PRN Troy Said, MD      albuterol  2 puff Inhalation Q6H PRN Troy Said, MD      aluminum-magnesium hydroxide-simethicone  15 mL Oral Q4H PRN Troy Said, MD      benztropine  1 mg Intramuscular Q4H PRN Max 6/day Troy Said, MD      benztropine  1 mg Oral Q4H PRN Max 6/day Troy Said, MD      benztropine  1 mg Oral BID Troy Said, MD      calcium carbonate  500 mg Oral TID PRN Troy Said, MD      Diclofenac Sodium  2 g Topical 4x Daily PRN Troy Said, MD      diphenhydrAMINE  25 mg Oral HS Troy Said, MD      fenofibrate  145 mg Oral Daily Troy Said, MD      gabapentin  600 mg Oral 4x Daily Troy Said, MD      haloperidol  1 mg Oral 4x Daily Troy Said, MD      haloperidol  5 mg Oral Q6H PRN Troy Said, MD      hydrOXYzine HCL  25 mg Oral Q6H PRN Max 4/day Troy Said, MD      hydrOXYzine HCL  50 mg Oral Q6H PRN Max 4/day Troy Said, MD      lidocaine  1 patch Topical Daily Troy Said, MD      LORazepam  1 mg Intramuscular Q6H PRN Troy Said, MD      LORazepam  0 5 mg Oral 4x Daily Troy Simpson MD      magnesium hydroxide  30 mL Oral Daily PRN Troy Said, MD      methocarbamol  500 mg Oral Q8H PRN Troy Said, MD      nicotine  1 patch Transdermal Daily Troy Said, MD      nicotine polacrilex  4 mg Oral Q2H PRN Troy Said, MD      OLANZapine  2 5 mg Oral Q8H PRN Troy Said, MD      OLANZapine  5 mg Oral Q3H PRN Troy Simpson MD      OLANZapine  7 5 mg Oral Q3H PRN Troy Simpson MD      OLANZapine  10 mg Intramuscular Q3H PRN Troy Simpson MD  OLANZapine  5 mg Intramuscular Q3H PRN Jean-Paul Kan MD      OLANZapine  10 mg Oral HS Jean-Paul Kan MD      OLANZapine  15 mg Oral Daily Jaen-Paul Kan MD      paliperidone palmitate ER  234 mg Intramuscular Q30 Days Jean-Paul Kan MD      pantoprazole  40 mg Oral Early Morning Jean-Paul Kan MD      Pramox-PE-Glycerin-Petrolatum  1 application Rectal 4x Daily PRN Jean-Paul Kan MD      prazosin  1 mg Oral HS Jean-Paul Kan MD      psyllium  1 packet Oral Daily Jean-Paul Kan MD      sertraline  100 mg Oral Daily Jean-Paul Kan MD         Counseling / Coordination of Care: Total floor / unit time spent today 15 minutes  Greater than 50% of total time was spent with the patient and / or family counseling and / or somewhat receptive to supportive listening and teaching positive coping skills to deal with symptom mangement  Patient's Rights, confidentiality and exceptions to confidentiality, use of automated medical record, [facility] staff access to medical record, and consent to treatment reviewed  This note was  not shared with the patient because it might further aggravate her psychiatric condition  This note has been dictated

## 2021-07-28 NOTE — PLAN OF CARE
Problem: Alteration in Thoughts and Perception  Goal: Verbalize thoughts and feelings  Description: Interventions:  - Promote a nonjudgmental and trusting relationship with the patient through active listening and therapeutic communication  - Assess patient's level of functioning, behavior and potential for risk  - Engage patient in 1 on 1 interactions  - Encourage patient to express fears, feelings, frustrations, and discuss symptoms    - Carpenter patient to reality, help patient recognize reality-based thinking   - Administer medications as ordered and assess for potential side effects  - Provide the patient education related to the signs and symptoms of the illness and desired effects of prescribed medications  Outcome: Progressing  Goal: Agree to be compliant with medication regime, as prescribed and report medication side effects  Description: Interventions:  - Offer appropriate PRN medication and supervise ingestion; conduct AIMS, as needed   Outcome: Progressing  Goal: Attend and participate in unit activities, including therapeutic, recreational, and educational groups  Description: Interventions:  -Encourage Visitation and family involvement in care  Outcome: Progressing

## 2021-07-28 NOTE — PROGRESS NOTES
07/28/21 0830   Team Meeting   Meeting Type Daily Rounds   Initial Conference Date 07/28/21   Patient/Family Present   Patient Present No   Patient's Family Present No     Daily Rounds Documentation     Team Members Present:   MD Rafa Ruiz, YUDY Mendoza, YUDY Haque, [address] [place] [address]    Pleasant and cooperative  Denied all symptoms; feeling good yesterday  PRN Voltaren and Robaxin for muscle spasms  Attended 4/6 groups  Compliant with medications and meals  Slept    CSP completed; discharge Thursday at 9:00AM

## 2021-07-28 NOTE — SOCIAL WORK
Prior authorization initiated for Olanzapine PRN, will likely get response within 24 hours  SW will follow up     1500: informed from pharmacist that the new medication (invega 3 mg PRN also requires a prior authorization)  initiating  1515: on phone with [place] department, informed that invega is also a considered a duplicate therapy despite it being a PRN  Discussing how to proceed with zyprexa vs invega    1600: informed by pharmacist that a new script is needed for zyprexa PRN since the old was voided after invega was sent  Requesting for new zyprexa 5 mg PRN, since insurance [place] department is now stating there may be a good chance of it being approved, since they had neglected to identify it as a PRN before  Will follow up early tomorrow morning and  all medications once they are ready     -Also secured PCP appointment (patient found a new PCP in [facility], populated in [facility])

## 2021-07-28 NOTE — NURSING NOTE
Pearl Segundo has been visible out in the milieu  No yelling or behaviors noted  Patient expresses excitement for discharge tomorrow  Denies all psych symptoms at this time   She is medication and meal compliant   Attended therapeutic activity and wrap up group  Consumed 100% of dinner  Continue to monitor

## 2021-07-28 NOTE — NURSING NOTE
Patient is compliant with medication and meals  She is social with most of her peers   Patient is a little anxious about her discharge tomorrow

## 2021-07-29 VITALS
BODY MASS INDEX: 27.68 KG/M2 | WEIGHT: 156.2 LBS | DIASTOLIC BLOOD PRESSURE: 58 MMHG | HEART RATE: 98 BPM | OXYGEN SATURATION: 94 % | HEIGHT: 63 IN | RESPIRATION RATE: 18 BRPM | SYSTOLIC BLOOD PRESSURE: 107 MMHG | TEMPERATURE: 97.8 F

## 2021-07-29 PROCEDURE — 99238 HOSP IP/OBS DSCHRG MGMT 30/<: CPT | Performed by: PSYCHIATRY & NEUROLOGY

## 2021-07-29 RX ORDER — RISPERIDONE 1 MG/1
TABLET, FILM COATED ORAL
Qty: 30 TABLET | Refills: 0 | Status: SHIPPED | OUTPATIENT
Start: 2021-07-29 | End: 2021-08-23 | Stop reason: ALTCHOICE

## 2021-07-29 RX ORDER — OLANZAPINE 5 MG/1
TABLET, ORALLY DISINTEGRATING ORAL
Qty: 30 TABLET | Refills: 0 | Status: SHIPPED | OUTPATIENT
Start: 2021-07-29

## 2021-07-29 RX ADMIN — BENZTROPINE MESYLATE 1 MG: 1 TABLET ORAL at 08:00

## 2021-07-29 RX ADMIN — OLANZAPINE 15 MG: 5 TABLET, FILM COATED ORAL at 08:01

## 2021-07-29 RX ADMIN — PSYLLIUM HUSK 1 PACKET: 3.4 POWDER ORAL at 07:56

## 2021-07-29 RX ADMIN — GABAPENTIN 600 MG: 300 CAPSULE ORAL at 08:01

## 2021-07-29 RX ADMIN — FENOFIBRATE 145 MG: 145 TABLET ORAL at 08:01

## 2021-07-29 RX ADMIN — LIDOCAINE 1 PATCH: 50 PATCH CUTANEOUS at 08:02

## 2021-07-29 RX ADMIN — LORAZEPAM 0.5 MG: 0.5 TABLET ORAL at 08:01

## 2021-07-29 RX ADMIN — ACETAMINOPHEN 650 MG: 325 TABLET, FILM COATED ORAL at 06:27

## 2021-07-29 RX ADMIN — PANTOPRAZOLE SODIUM 40 MG: 40 TABLET, DELAYED RELEASE ORAL at 06:02

## 2021-07-29 RX ADMIN — HALOPERIDOL 1 MG: 1 TABLET ORAL at 08:00

## 2021-07-29 NOTE — TREATMENT TEAM
Patient was discharged at 37  She was given all her medication all necessary papers along with all her personal belongings   Patient excited to be leaving
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
## 2021-07-29 NOTE — NURSING NOTE
In bed eyes closed appears to be sleeping, body shifting and breath sounds noted will continue to monitor Notified the patient of the below lab results and recommendation. Patient verbalized understanding. Patient agrees to OV. OV scheduled tomorrow with the provider. Answered all questions at this time.

## 2021-07-29 NOTE — SOCIAL WORK
Medications resolved, 30 day supply including routine and PRNs picked up from [facility] and delivered to discharge nurse  MAR also faxed to [facility] pharmacy per their request  Discussed medications with patient and the two over-the-counter that were not included, but can be obtained from drug store  Pt fine with this and also aware that her invega sustenna script was sent to speciality pharmacy and act team will follow up with this  Finalized and reviewed AVS with patient who verbalized understanding  Pt excited about discharge, denied A/V/H, denied SI and HI, and verbalized readiness for discharge  Escorted patient with RN and MHT to her teresita tay staff  SW reviewed AVS with [facility] staff and gave medication and belongings over, patient left in good spirits at approximately 0945

## 2021-07-29 NOTE — TREATMENT TEAM
Patient was discharged at 36 Patient was given all her medication ,all necessary papers and all her personal belongings Was picked up the the group home where she is going  Patient excited to leave

## 2021-07-29 NOTE — SOCIAL WORK
Secured dates of [facility] team follow up and team members, populated in AVS  DC plan still for 9am  SW to follow up at 8am with prior authorization and once all meds are ready, to

## 2021-08-09 ENCOUNTER — TELEPHONE (OUTPATIENT)
Dept: INTERNAL MEDICINE CLINIC | Facility: CLINIC | Age: 51
End: 2021-08-09

## 2021-08-09 ENCOUNTER — OFFICE VISIT (OUTPATIENT)
Dept: INTERNAL MEDICINE CLINIC | Facility: CLINIC | Age: 51
End: 2021-08-09
Payer: COMMERCIAL

## 2021-08-09 ENCOUNTER — APPOINTMENT (OUTPATIENT)
Dept: LAB | Facility: CLINIC | Age: 51
End: 2021-08-09
Payer: COMMERCIAL

## 2021-08-09 VITALS
SYSTOLIC BLOOD PRESSURE: 108 MMHG | WEIGHT: 154.2 LBS | HEART RATE: 90 BPM | DIASTOLIC BLOOD PRESSURE: 64 MMHG | HEIGHT: 62 IN | BODY MASS INDEX: 28.37 KG/M2 | TEMPERATURE: 97.7 F | OXYGEN SATURATION: 94 % | RESPIRATION RATE: 15 BRPM

## 2021-08-09 DIAGNOSIS — F25.0 SCHIZOAFFECTIVE DISORDER, BIPOLAR TYPE (HCC): Chronic | ICD-10-CM

## 2021-08-09 DIAGNOSIS — M25.542 JOINT PAIN IN BOTH HANDS: ICD-10-CM

## 2021-08-09 DIAGNOSIS — R91.8 MULTIPLE LUNG NODULES: ICD-10-CM

## 2021-08-09 DIAGNOSIS — Z12.12 SCREENING FOR COLORECTAL CANCER: ICD-10-CM

## 2021-08-09 DIAGNOSIS — Z72.0 TOBACCO ABUSE: ICD-10-CM

## 2021-08-09 DIAGNOSIS — Z23 ENCOUNTER FOR IMMUNIZATION: ICD-10-CM

## 2021-08-09 DIAGNOSIS — Z12.4 SCREENING FOR CERVICAL CANCER: ICD-10-CM

## 2021-08-09 DIAGNOSIS — Z87.891 PERSONAL HISTORY OF NICOTINE DEPENDENCE: ICD-10-CM

## 2021-08-09 DIAGNOSIS — Z12.11 SCREENING FOR COLORECTAL CANCER: ICD-10-CM

## 2021-08-09 DIAGNOSIS — R53.82 CHRONIC FATIGUE: ICD-10-CM

## 2021-08-09 DIAGNOSIS — M54.50 LUMBAR BACK PAIN: ICD-10-CM

## 2021-08-09 DIAGNOSIS — M25.541 JOINT PAIN IN BOTH HANDS: ICD-10-CM

## 2021-08-09 DIAGNOSIS — J45.20 MILD INTERMITTENT ASTHMA WITHOUT COMPLICATION: Primary | Chronic | ICD-10-CM

## 2021-08-09 DIAGNOSIS — Z12.2 ENCOUNTER FOR SCREENING FOR LUNG CANCER: ICD-10-CM

## 2021-08-09 DIAGNOSIS — F41.1 GAD (GENERALIZED ANXIETY DISORDER): Chronic | ICD-10-CM

## 2021-08-09 DIAGNOSIS — K21.9 GASTROESOPHAGEAL REFLUX DISEASE WITHOUT ESOPHAGITIS: Chronic | ICD-10-CM

## 2021-08-09 DIAGNOSIS — M51.36 DEGENERATIVE DISC DISEASE, LUMBAR: ICD-10-CM

## 2021-08-09 DIAGNOSIS — K59.01 SLOW TRANSIT CONSTIPATION: ICD-10-CM

## 2021-08-09 PROBLEM — R45.89 SUICIDAL BEHAVIOR: Status: RESOLVED | Noted: 2017-10-26 | Resolved: 2021-08-09

## 2021-08-09 PROBLEM — S09.90XA INJURY OF HEAD: Status: RESOLVED | Noted: 2021-07-09 | Resolved: 2021-08-09

## 2021-08-09 PROBLEM — F10.20 UNCOMPLICATED ALCOHOL DEPENDENCE (HCC): Chronic | Status: RESOLVED | Noted: 2017-10-26 | Resolved: 2021-08-09

## 2021-08-09 PROBLEM — R11.10 NON-INTRACTABLE VOMITING: Status: RESOLVED | Noted: 2019-08-19 | Resolved: 2021-08-09

## 2021-08-09 LAB
25(OH)D3 SERPL-MCNC: 22.9 NG/ML (ref 30–100)
ALBUMIN SERPL BCP-MCNC: 4.1 G/DL (ref 3.5–5)
ALP SERPL-CCNC: 91 U/L (ref 46–116)
ALT SERPL W P-5'-P-CCNC: 31 U/L (ref 12–78)
ANION GAP SERPL CALCULATED.3IONS-SCNC: 7 MMOL/L (ref 4–13)
AST SERPL W P-5'-P-CCNC: 18 U/L (ref 5–45)
BASOPHILS # BLD AUTO: 0.03 THOUSANDS/ΜL (ref 0–0.1)
BASOPHILS NFR BLD AUTO: 0 % (ref 0–1)
BILIRUB SERPL-MCNC: 0.21 MG/DL (ref 0.2–1)
BUN SERPL-MCNC: 8 MG/DL (ref 5–25)
CALCIUM SERPL-MCNC: 9.5 MG/DL (ref 8.3–10.1)
CHLORIDE SERPL-SCNC: 103 MMOL/L (ref 100–108)
CO2 SERPL-SCNC: 26 MMOL/L (ref 21–32)
CREAT SERPL-MCNC: 0.67 MG/DL (ref 0.6–1.3)
EOSINOPHIL # BLD AUTO: 0.17 THOUSAND/ΜL (ref 0–0.61)
EOSINOPHIL NFR BLD AUTO: 2 % (ref 0–6)
ERYTHROCYTE [DISTWIDTH] IN BLOOD BY AUTOMATED COUNT: 12.4 % (ref 11.6–15.1)
GFR SERPL CREATININE-BSD FRML MDRD: 103 ML/MIN/1.73SQ M
GLUCOSE P FAST SERPL-MCNC: 83 MG/DL (ref 65–99)
HCT VFR BLD AUTO: 41.7 % (ref 34.8–46.1)
HGB BLD-MCNC: 13.3 G/DL (ref 11.5–15.4)
IMM GRANULOCYTES # BLD AUTO: 0.03 THOUSAND/UL (ref 0–0.2)
IMM GRANULOCYTES NFR BLD AUTO: 0 % (ref 0–2)
LYMPHOCYTES # BLD AUTO: 2.54 THOUSANDS/ΜL (ref 0.6–4.47)
LYMPHOCYTES NFR BLD AUTO: 30 % (ref 14–44)
MCH RBC QN AUTO: 29.4 PG (ref 26.8–34.3)
MCHC RBC AUTO-ENTMCNC: 31.9 G/DL (ref 31.4–37.4)
MCV RBC AUTO: 92 FL (ref 82–98)
MONOCYTES # BLD AUTO: 0.53 THOUSAND/ΜL (ref 0.17–1.22)
MONOCYTES NFR BLD AUTO: 6 % (ref 4–12)
NEUTROPHILS # BLD AUTO: 5.17 THOUSANDS/ΜL (ref 1.85–7.62)
NEUTS SEG NFR BLD AUTO: 62 % (ref 43–75)
NRBC BLD AUTO-RTO: 0 /100 WBCS
PLATELET # BLD AUTO: 356 THOUSANDS/UL (ref 149–390)
PMV BLD AUTO: 9.5 FL (ref 8.9–12.7)
POTASSIUM SERPL-SCNC: 4 MMOL/L (ref 3.5–5.3)
PROT SERPL-MCNC: 8.4 G/DL (ref 6.4–8.2)
RBC # BLD AUTO: 4.53 MILLION/UL (ref 3.81–5.12)
SODIUM SERPL-SCNC: 136 MMOL/L (ref 136–145)
TSH SERPL DL<=0.05 MIU/L-ACNC: 1.34 UIU/ML (ref 0.36–3.74)
VIT B12 SERPL-MCNC: 577 PG/ML (ref 100–900)
WBC # BLD AUTO: 8.47 THOUSAND/UL (ref 4.31–10.16)

## 2021-08-09 PROCEDURE — 36415 COLL VENOUS BLD VENIPUNCTURE: CPT

## 2021-08-09 PROCEDURE — 82306 VITAMIN D 25 HYDROXY: CPT

## 2021-08-09 PROCEDURE — 85025 COMPLETE CBC W/AUTO DIFF WBC: CPT

## 2021-08-09 PROCEDURE — 90471 IMMUNIZATION ADMIN: CPT

## 2021-08-09 PROCEDURE — 84443 ASSAY THYROID STIM HORMONE: CPT

## 2021-08-09 PROCEDURE — 3725F SCREEN DEPRESSION PERFORMED: CPT | Performed by: NURSE PRACTITIONER

## 2021-08-09 PROCEDURE — 90715 TDAP VACCINE 7 YRS/> IM: CPT

## 2021-08-09 PROCEDURE — 4004F PT TOBACCO SCREEN RCVD TLK: CPT | Performed by: NURSE PRACTITIONER

## 2021-08-09 PROCEDURE — 99214 OFFICE O/P EST MOD 30 MIN: CPT | Performed by: NURSE PRACTITIONER

## 2021-08-09 PROCEDURE — 80053 COMPREHEN METABOLIC PANEL: CPT

## 2021-08-09 PROCEDURE — 82607 VITAMIN B-12: CPT

## 2021-08-09 PROCEDURE — 3008F BODY MASS INDEX DOCD: CPT | Performed by: NURSE PRACTITIONER

## 2021-08-09 RX ORDER — GLUCOSAM/CHON-MSM1/C/MANG/BOSW 750-644 MG
1 TABLET ORAL 2 TIMES DAILY
Qty: 180 TABLET | Refills: 1 | Status: SHIPPED | OUTPATIENT
Start: 2021-08-09 | End: 2021-08-23 | Stop reason: SDUPTHER

## 2021-08-09 RX ORDER — GLUCOSAM/CHON-MSM1/C/MANG/BOSW 750-644 MG
1 TABLET ORAL 2 TIMES DAILY
Qty: 180 TABLET | Refills: 1 | Status: CANCELLED | OUTPATIENT
Start: 2021-08-09

## 2021-08-09 RX ORDER — MULTIVITAMIN
1 TABLET ORAL DAILY
COMMUNITY
End: 2022-02-18 | Stop reason: SDUPTHER

## 2021-08-09 NOTE — ASSESSMENT & PLAN NOTE
The patient has had no acute exacerbations of her asthma  She continues on her Proventil inhaler p [facility] n  [facility]

## 2021-08-09 NOTE — PATIENT INSTRUCTIONS
Osteo biflex triple strength twice daily  Turmeric 400 mg twice daily      Arthritis   AMBULATORY CARE:   Arthritis  is pain or disease in one or more joints  There are many types of arthritis  Types such as rheumatoid arthritis cause inflammation in the joints  Other types wear away the cartilage between joints, such as osteoarthritis  This makes the bones of the joint rub together when you move the joint  An infection from bacteria, a virus, or a fungus can also cause arthritis  Your symptoms may be constant, or symptoms may come and go  Arthritis often gets worse over time and can cause permanent joint damage  Common signs and symptoms of arthritis:   · Pain, swelling, or stiffness in the joint    · Limited range of motion in the joint    · Warmth or redness over the joint    · Tenderness when you touch the joint    · Stiff joints in the morning that loosen with movement    · A creaking or grinding sound when you move the joint    · Fever    Call your doctor or rheumatologist if:   · You have a fever and severe joint pain or swelling  · You cannot move the affected joint  · You have severe joint pain you cannot tolerate  · You have a new or worsening rash  · Your pain or swelling does not get better with treatment  · You have questions or concerns about your condition or care  Treatment  will depend on the type of arthritis you have and if it is severe  You may need any of the following:  · Acetaminophen  decreases pain and fever  It is available without a doctor's order  Ask how much to take and how often to take it  Follow directions  Read the labels of all other medicines you are using to see if they also contain acetaminophen, or ask your doctor or pharmacist  Acetaminophen can cause liver damage if not taken correctly  Do not use more than 4 grams (4,000 milligrams) total of acetaminophen in one day  · NSAIDs , such as ibuprofen, help decrease swelling, pain, and fever   This medicine is available with or without a doctor's order  NSAIDs can cause stomach bleeding or kidney problems in certain people  If you take blood thinner medicine, always ask your healthcare provider if NSAIDs are safe for you  Always read the medicine label and follow directions  · Steroid medicine  helps reduce swelling and pain  · Prescription pain medicine  may be given  Ask your healthcare provider how to take this medicine safely  Some prescription pain medicines contain acetaminophen  Do not take other medicines that contain acetaminophen without talking to your healthcare provider  Too much acetaminophen may cause liver damage  Prescription pain medicine may cause constipation  Ask your healthcare provider how to prevent or treat constipation  · Surgery  may be needed to repair or replace a damaged joint  Manage your symptoms:   · Rest your painful joint so it can heal   Your healthcare provider may recommend crutches or a walker if the affected joint is in a leg  · Apply ice or heat to the joint  Both can help decrease swelling and pain  Ice may also help prevent tissue damage  Use an ice pack, or put crushed ice in a plastic bag  Cover it with a towel and place it on your joint for 15 to 20 minutes every hour or as directed  You can apply heat for 20 minutes every 2 hours  Heat treatment includes hot packs or heat lamps  · Elevate your joint  Elevation helps reduce swelling and pain  Raise your joint above the level of your heart as often as you can  Prop your painful joint on pillows to keep it above your heart comfortably  Manage arthritis:   · Talk to your healthcare providers about your arthritis medicines  Some medicines may only be needed when you have arthritis pain  You may need to take other medicines every day to prevent arthritis from getting worse  Your healthcare providers will help you understand all your medicines and when to take them   It is important to take the medicines as directed, even if you start to feel better  You can continue to have joint damage and inflammation even if you do not feel it  · Eat a variety of healthy foods  Healthy foods include fruits, vegetables, whole-grain breads, low-fat dairy products, beans, lean meats, and fish  Ask if you need to be on a special diet  A diet rich in calcium and vitamin D may decrease your risk of osteoporosis  Foods high in calcium include milk, cheese, broccoli, and tofu  Vitamin D may be found in meat, fish, fortified milk, cereal and bread  Ask if you need calcium or vitamin D supplements  · Go to physical or occupational therapy as directed  A physical therapist can teach you exercises to improve flexibility and range of motion  You may also be shown non-weight-bearing exercises that are safe for your joints, such as swimming  Exercise can help keep your joints flexible and reduce pain  An occupational therapist can help you learn to do your daily activities when your joints are stiff or sore  · Maintain a healthy weight  Extra weight puts increased pressure on your joints  Ask your healthcare provider what you should weigh  If you need to lose weight, he or she can help you create a weight loss program  Weight loss can help reduce pain and increase your ability to do your activities  · Wear flat or low-heeled shoes  This will help decrease pain and reduce pressure on your ankle, knee, and hip joints  · Do not smoke  Nicotine and other chemicals in cigarettes and cigars can damage your bones and joints  Ask your healthcare provider for information if you currently smoke and need help to quit  E-cigarettes or smokeless tobacco still contain nicotine  Talk to your healthcare provider before you use these products  Support devices:   · Orthotic shoes or insoles  help support your feet when you walk  · Crutches, a cane, or a walker  may help decrease your risk for falling   They also decrease stress on affected joints  · Devices to prevent falls  include raised toilet seats and bathtub bars to help you get up from sitting  Handrails can be placed in areas where you need balance and support  · Devices to help with support and rest  include splints to wear on your hands and a firm pillow while you sleep  Use a pillow that is firm enough to support your neck and head  Follow up with your healthcare provider or rheumatologist as directed:  Write down your questions so you remember to ask them during your visits  © Copyright [employer] 2021 Information is for End User's use only and may not be sold, redistributed or otherwise used for commercial purposes  All illustrations and images included in CareNotes® are the copyrighted property of [employer] D A M , Inc  or [facility] Melony Awan   The above information is an  only  It is not intended as medical advice for individual conditions or treatments  Talk to your doctor, nurse or pharmacist before following any medical regimen to see if it is safe and effective for you  Weight Management   WHAT YOU NEED TO KNOW:   Being overweight increases your risk of health conditions such as heart disease, high blood pressure, type 2 diabetes, and certain types of cancer  It can also increase your risk for osteoarthritis, sleep apnea, and other respiratory problems  Aim for a slow, steady weight loss  Even a small amount of weight loss can lower your risk of health problems  DISCHARGE INSTRUCTIONS:   How to lose weight safely:  A safe and healthy way to lose weight is to eat fewer calories and get regular exercise  · You can lose up about 1 pound a week by decreasing the number of calories you eat by 500 calories each day  You can decrease calories by eating smaller portion sizes or by cutting out high-calorie foods  Read labels to find out how many calories are in the foods you eat           · You can also burn calories with exercise such as walking, swimming, or biking  You will be more likely to keep weight off if you make these changes part of your lifestyle  Exercise at least 30 minutes per day on most days of the week  You can also fit in more physical activity by taking the stairs instead of the elevator or parking farther away from stores  Ask your healthcare provider about the best exercise plan for you  Healthy meal plan for weight management:  A healthy meal plan includes a variety of foods, contains fewer calories, and helps you stay healthy  A healthy meal plan includes the following:     · Eat whole-grain foods more often  A healthy meal plan should contain fiber  Fiber is the part of grains, fruits, and vegetables that is not broken down by your body  Whole-grain foods are healthy and provide extra fiber in your diet  Some examples of whole-grain foods are whole-wheat breads and pastas, oatmeal, brown rice, and bulgur  · Eat a variety of vegetables every day  Include dark, leafy greens such as spinach, kale, virginia greens, and mustard greens  Eat yellow and orange vegetables such as carrots, sweet potatoes, and winter squash  · Eat a variety of fruits every day  Choose fresh or canned fruit (canned in its own juice or light syrup) instead of juice  Fruit juice has very little or no fiber  · Eat low-fat dairy foods  Drink fat-free (skim) milk or 1% milk  Eat fat-free yogurt and low-fat cottage cheese  Try low-fat cheeses such as mozzarella and other reduced-fat cheeses  · Choose meat and other protein foods that are low in fat  Choose beans or other legumes such as split peas or lentils  Choose fish, skinless poultry (chicken or turkey), or lean cuts of red meat (beef or pork)  Before you cook meat or poultry, cut off any visible fat  · Use less fat and oil  Try baking foods instead of frying them  Add less fat, such as margarine, sour cream, regular salad dressing, and mayonnaise to foods   Eat fewer high-fat foods  Some examples of high-fat foods include french fries, doughnuts, ice cream, and cakes  · Eat fewer sweets  Limit foods and drinks that are high in sugar  This includes candy, cookies, regular soda, and sweetened drinks  Ways to decrease calories:   · Eat smaller portions  ? Use a small plate with smaller servings  ? Do not eat second helpings  ? When you eat at a restaurant, ask for a box and place half of your meal in the box before you eat  ? Share an entrée with someone else  · Replace high-calorie snacks with healthy, low-calorie snacks  ? Choose fresh fruit, vegetables, fat-free rice cakes, or air-popped popcorn instead of potato chips, nuts, or chocolate  ? Choose water or calorie-free drinks instead of soda or sweetened drinks  · Do not shop for groceries when you are hungry  You may be more likely to make unhealthy food choices  Take a grocery list of healthy foods and shop after you have eaten  · Eat regular meals  Do not skip meals  Skipping meals can lead to overeating later in the day  This can make it harder for you to lose weight  Eat a healthy snack in place of a meal if you do not have time to eat a regular meal  Talk with a dietitian to help you create a meal plan and schedule that is right for you  Other things to consider as you try to lose weight:   · Be aware of situations that may give you the urge to overeat, such as eating while watching television  Find ways to avoid these situations  For example, read a book, go for a walk, or do crafts  · Meet with a weight loss support group or friends who are also trying to lose weight  This may help you stay motivated to continue working on your weight loss goals  © Copyright [employer] 2021 Information is for End User's use only and may not be sold, redistributed or otherwise used for commercial purposes   All illustrations and images included in CareNotes® are the copyrighted property of [employer]  or [facility]  The above information is an  only  It is not intended as medical advice for individual conditions or treatments  Talk to your doctor, nurse or pharmacist before following any medical regimen to see if it is safe and effective for you  Tdap and Td Vaccines for Adults   AMBULATORY CARE:   Tdap and Td vaccines  are shots given to protect you and others around you from tetanus, diphtheria, and pertussis (whooping cough)  These are severe infections caused by bacteria  Tetanus bacteria are found in dirt, manure, and dust  The bacteria enter the body through open skin, such as puncture wounds and burns  Diphtheria and pertussis bacteria are spread from person to person  Call your local emergency number (911 in the [address]) if:   · Your mouth and throat are swollen  · You are wheezing or have trouble breathing  · You have chest pain or your heart is beating faster than usual     · You feel weak or dizzy  Call your doctor if:   · You have severe pain, redness, and swelling in your arm where the shot was given  · Your face is red or swollen  · You have hives that spread over your body  · You have a fever or chills  · You have a headache, body aches, or joint pain  · You have nausea or diarrhea, or you are vomiting  · You have questions or concerns about the vaccine  Why you may need the Tdap vaccine:   · You did not get some or any of the recommended DTaP doses as a child  · You did not get Tdap when you were 6or 15years old  · You are a healthcare worker who never got a dose of Tdap  · You never got a dose of Tdap and will have close contact with a baby younger than 12 months  Get the vaccine within 2 weeks of the close contact, if possible  · You have a severe cut or burn  · You are a pregnant woman  You need 1 dose of Tdap during each pregnancy, at 27 to 36 weeks      · You have just had a baby and did not get a dose of Tdap during your pregnancy  This dose is only given if you never had a dose of Tdap  When the Td vaccine is given:  The Td vaccine is a booster shot that may be given every 10 years  Td can also be given after a severe wound or burn to prevent tetanus  This may be given if it has been at least 5 years since your last Td vaccine  Do not get the Tdap vaccine if:   · You are allergic to any part of the vaccine  · You had a severe allergic reaction to DTaP or DTP  · You had seizures or a coma within 7 days of receiving DTaP or DTP, caused by the vaccine  You can still safely get the Td vaccine in this case  Do not get the Td vaccine if:   · You had an allergic reaction to DTaP, DTP, Tdap, or Td  · You are allergic to any part of the Td vaccine  Reasons to wait to get the Tdap or Td vaccine: Your provider may wait to give the vaccine until he or she feels it is safe for you  Your provider will need to know if you have or had any of the following:  · A seizure disorder or a problem with your nervous system    · Severe pain or swelling after a dose of DTaP, DTP, or Td    · Any severe allergy    · A history of Guillain-Barré syndrome    · A fever of 105º F (40 5º C) after getting DTaP or DTP    · A fever or any current illness    Risks of the Tdap and Td vaccines: The area where the vaccine was given may be red, tender, or swollen  This should get better in 1 to 2 days  Rarely, you may develop severe shoulder pain that lasts longer than 2 days  You may develop a fever  You may have an allergic reaction to the vaccine  This can be life-threatening  Apply a warm compress  to the injection area as directed to decrease pain and swelling  Follow up with your doctor as directed:  Write down your questions so you remember to ask them during your visits  © Copyright [employer] 2021 Information is for End User's use only and may not be sold, redistributed or otherwise used for commercial purposes   All illustrations and images included in CareNotes® are the copyrighted property of [employer] M , Inc  or Daryl Laguerre  The above information is an  only  It is not intended as medical advice for individual conditions or treatments  Talk to your doctor, nurse or pharmacist before following any medical regimen to see if it is safe and effective for you

## 2021-08-09 NOTE — PROGRESS NOTES
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 such as walking, swimming, or biking  You will be more likely to keep weight off if you make these changes part of your lifestyle  Exercise at least 30 minutes per day on most days of the week  You can also fit in more physical activity by taking the stairs instead of the elevator or parking farther away from stores  Ask your healthcare provider about the best exercise plan for you  Healthy meal plan for weight management:  A healthy meal plan includes a variety of foods, contains fewer calories, and helps you stay healthy  A healthy meal plan includes the following:     · Eat whole-grain foods more often  A healthy meal plan should contain fiber  Fiber is the part of grains, fruits, and vegetables that is not broken down by your body  Whole-grain foods are healthy and provide extra fiber in your diet  Some examples of whole-grain foods are whole-wheat breads and pastas, oatmeal, brown rice, and bulgur  · Eat a variety of vegetables every day  Include dark, leafy greens such as spinach, kale, virginia greens, and mustard greens   Eat yellow and orange vegetables such as carrots, sweet potatoes, and winter squash  · Eat a variety of fruits every day  Choose fresh or canned fruit (canned in its own juice or light syrup) instead of juice  Fruit juice has very little or no fiber  · Eat low-fat dairy foods  Drink fat-free (skim) milk or 1% milk  Eat fat-free yogurt and low-fat cottage cheese  Try low-fat cheeses such as mozzarella and other reduced-fat cheeses  · Choose meat and other protein foods that are low in fat  Choose beans or other legumes such as split peas or lentils  Choose fish, skinless poultry (chicken or turkey), or lean cuts of red meat (beef or pork)  Before you cook meat or poultry, cut off any visible fat  · Use less fat and oil  Try baking foods instead of frying them  Add less fat, such as margarine, sour cream, regular salad dressing, and mayonnaise to foods  Eat fewer high-fat foods  Some examples of high-fat foods include french fries, doughnuts, ice cream, and cakes  · Eat fewer sweets  Limit foods and drinks that are high in sugar  This includes candy, cookies, regular soda, and sweetened drinks  Ways to decrease calories:   · Eat smaller portions  ? Use a small plate with smaller servings  ? Do not eat second helpings  ? When you eat at a restaurant, ask for a box and place half of your meal in the box before you eat  ? Share an entrée with someone else  · Replace high-calorie snacks with healthy, low-calorie snacks  ? Choose fresh fruit, vegetables, fat-free rice cakes, or air-popped popcorn instead of potato chips, nuts, or chocolate  ? Choose water or calorie-free drinks instead of soda or sweetened drinks  · Do not shop for groceries when you are hungry  You may be more likely to make unhealthy food choices  Take a grocery list of healthy foods and shop after you have eaten  · Eat regular meals  Do not skip meals  Skipping meals can lead to overeating later in the day   This can make it harder for you to lose weight  Eat a healthy snack in place of a meal if you do not have time to eat a regular meal  Talk with a dietitian to help you create a meal plan and schedule that is right for you  Other things to consider as you try to lose weight:   · Be aware of situations that may give you the urge to overeat, such as eating while watching television  Find ways to avoid these situations  For example, read a book, go for a walk, or do crafts  · Meet with a weight loss support group or friends who are also trying to lose weight  This may help you stay motivated to continue working on your weight loss goals  © Copyright [employer] 2021 Information is for End User's use only and may not be sold, redistributed or otherwise used for commercial purposes  All illustrations and images included in CareNotes® are the copyrighted property of [employer] A M [employer]  or Daryl Laguerre  The above information is an  only  It is not intended as medical advice for individual conditions or treatments  Talk to your doctor, nurse or pharmacist before following any medical regimen to see if it is safe and effective for you  Tdap and Td Vaccines for Adults   AMBULATORY CARE:   Tdap and Td vaccines  are shots given to protect you and others around you from tetanus, diphtheria, and pertussis (whooping cough)  These are severe infections caused by bacteria  Tetanus bacteria are found in dirt, manure, and dust  The bacteria enter the body through open skin, such as puncture wounds and burns  Diphtheria and pertussis bacteria are spread from person to person  Call your local emergency number (911 in the [address] [facility]) if:   · Your mouth and throat are swollen  · You are wheezing or have trouble breathing  · You have chest pain or your heart is beating faster than usual     · You feel weak or dizzy  Call your doctor if:   · You have severe pain, redness, and swelling in your arm where the shot was given      · Your face is red or swollen  · You have hives that spread over your body  · You have a fever or chills  · You have a headache, body aches, or joint pain  · You have nausea or diarrhea, or you are vomiting  · You have questions or concerns about the vaccine  Why you may need the Tdap vaccine:   · You did not get some or any of the recommended DTaP doses as a child  · You did not get Tdap when you were 6or 15years old  · You are a healthcare worker who never got a dose of Tdap  · You never got a dose of Tdap and will have close contact with a baby younger than 12 months  Get the vaccine within 2 weeks of the close contact, if possible  · You have a severe cut or burn  · You are a pregnant woman  You need 1 dose of Tdap during each pregnancy, at 27 to 36 weeks  · You have just had a baby and did not get a dose of Tdap during your pregnancy  This dose is only given if you never had a dose of Tdap  When the Td vaccine is given:  The Td vaccine is a booster shot that may be given every 10 years  Td can also be given after a severe wound or burn to prevent tetanus  This may be given if it has been at least 5 years since your last Td vaccine  Do not get the Tdap vaccine if:   · You are allergic to any part of the vaccine  · You had a severe allergic reaction to DTaP or DTP  · You had seizures or a coma within 7 days of receiving DTaP or DTP, caused by the vaccine  You can still safely get the Td vaccine in this case  Do not get the Td vaccine if:   · You had an allergic reaction to DTaP, DTP, Tdap, or Td  · You are allergic to any part of the Td vaccine  Reasons to wait to get the Tdap or Td vaccine: Your provider may wait to give the vaccine until he or she feels it is safe for you   Your provider will need to know if you have or had any of the following:  · A seizure disorder or a problem with your nervous system    · Severe pain or swelling after a dose of DTaP, DTP, or Td    · Any severe allergy    · A history of Guillain-Barré syndrome    · A fever of 105º F (40 5º C) after getting DTaP or DTP    · A fever or any current illness    Risks of the Tdap and Td vaccines: The area where the vaccine was given may be red, tender, or swollen  This should get better in 1 to 2 days  Rarely, you may develop severe shoulder pain that lasts longer than 2 days  You may develop a fever  You may have an allergic reaction to the vaccine  This can be life-threatening  Apply a warm compress  to the injection area as directed to decrease pain and swelling  Follow up with your doctor as directed:  Write down your questions so you remember to ask them during your visits  © Copyright [employer] 2021 Information is for End User's use only and may not be sold, redistributed or otherwise used for commercial purposes  All illustrations and images included in CareNotes® are the copyrighted property of A D A M , Inc  or [facility] Melony Awan   The above information is an  only  It is not intended as medical advice for individual conditions or treatments  Talk to your doctor, nurse or pharmacist before following any medical regimen to see if it is safe and effective for you          JARED Mcnally  [facility]

## 2021-08-09 NOTE — ASSESSMENT & PLAN NOTE
Patient has a history of a back injury and does have degenerative disc disease  She is requesting a referral to pain management

## 2021-08-09 NOTE — ASSESSMENT & PLAN NOTE
The patient continues to smoke approximately a pack and half cigarettes a day  She did have a CT scan of the chest performed in 2020 which shows some small pulmonary nodules and mild emphysema  A surveillance CT lung cancer screening was ordered  The patient is not interested in smoking cessation

## 2021-08-09 NOTE — ASSESSMENT & PLAN NOTE
·  The patient continues to follow with Gastroenterology  Anti-reflux measures such as raising the head of the bed, avoiding tight clothing or belts, avoiding eating late at night and not lying down shortly after mealtime and achieving weight loss are discussed  Avoid ASA, NSAID's, caffeine, peppermints, alcohol and tobacco    · Plan  ?  Continue pantoprazole 40 mg PO daily

## 2021-08-09 NOTE — TELEPHONE ENCOUNTER
CHANGING Pharmacy to:    [facility] , [address]   Fax [phone]  [phone]    PLEASE change this from today's office visit    Misc Natural Products (Osteo Bi-Flex Adv [address]) TABS

## 2021-08-13 DIAGNOSIS — K21.9 GASTROESOPHAGEAL REFLUX DISEASE, UNSPECIFIED WHETHER ESOPHAGITIS PRESENT: ICD-10-CM

## 2021-08-13 DIAGNOSIS — F43.12 POST-TRAUMATIC STRESS DISORDER, CHRONIC: ICD-10-CM

## 2021-08-13 DIAGNOSIS — E78.5 HYPERLIPIDEMIA: ICD-10-CM

## 2021-08-13 DIAGNOSIS — M54.9 BACKACHE: ICD-10-CM

## 2021-08-13 DIAGNOSIS — F25.0 SCHIZOAFFECTIVE DISORDER, BIPOLAR TYPE (HCC): Chronic | ICD-10-CM

## 2021-08-13 RX ORDER — SERTRALINE HYDROCHLORIDE 100 MG/1
100 TABLET, FILM COATED ORAL DAILY
Qty: 30 TABLET | Refills: 0 | Status: CANCELLED | OUTPATIENT
Start: 2021-08-13

## 2021-08-13 RX ORDER — FENOFIBRATE 145 MG/1
145 TABLET, COATED ORAL DAILY
Qty: 90 TABLET | Refills: 1 | Status: SHIPPED | OUTPATIENT
Start: 2021-08-13 | End: 2022-02-22 | Stop reason: SDUPTHER

## 2021-08-13 RX ORDER — LIDOCAINE 50 MG/G
1 PATCH TOPICAL DAILY
Qty: 30 PATCH | Refills: 0 | Status: SHIPPED | OUTPATIENT
Start: 2021-08-13 | End: 2021-09-30

## 2021-08-13 RX ORDER — BENZTROPINE MESYLATE 1 MG/1
1 TABLET ORAL 2 TIMES DAILY
Qty: 60 TABLET | Refills: 0 | Status: CANCELLED | OUTPATIENT
Start: 2021-08-13

## 2021-08-13 RX ORDER — PANTOPRAZOLE SODIUM 40 MG/1
40 TABLET, DELAYED RELEASE ORAL
Qty: 90 TABLET | Refills: 1 | Status: SHIPPED | OUTPATIENT
Start: 2021-08-13 | End: 2022-02-22 | Stop reason: SDUPTHER

## 2021-08-13 RX ORDER — DIPHENHYDRAMINE HCL 25 MG
25 TABLET ORAL
Qty: 90 TABLET | Refills: 1 | Status: SHIPPED | OUTPATIENT
Start: 2021-08-13

## 2021-08-13 RX ORDER — PRAZOSIN HYDROCHLORIDE 1 MG/1
1 CAPSULE ORAL
Qty: 90 CAPSULE | Refills: 1 | Status: SHIPPED | OUTPATIENT
Start: 2021-08-13 | End: 2022-07-22 | Stop reason: ALTCHOICE

## 2021-08-13 NOTE — TELEPHONE ENCOUNTER
I refilled all of the patient's meds with the exception of her psych meds which need to be refilled by psychiatry    She should contact Dr Ras Gonzalez office for those refills

## 2021-08-13 NOTE — TELEPHONE ENCOUNTER
The following RX's need to be ordered @   [facility]       -benztropine (COGENTIN) 1 mg tablet    -diphenhydrAMINE (BENADRYL) 25 mg tablet    -fenofibrate (TRICOR) 145 mg tablet    -lidocaine (LIDODERM) 5 %    -pantoprazole (PROTONIX) 40 mg tablet    -prazosin (MINIPRESS) 1 mg capsule    -sertraline (ZOLOFT) 100 mg tablet    -Reguloid powder  1 tsp 8 am & 8 PM   (not on her med list)    Patient is almost out of her med's  Sherre Soulier

## 2021-08-17 ENCOUNTER — HOSPITAL ENCOUNTER (OUTPATIENT)
Dept: CT IMAGING | Facility: CLINIC | Age: 51
Discharge: HOME | End: 2021-08-17
Payer: COMMERCIAL

## 2021-08-17 DIAGNOSIS — Z12.2 ENCOUNTER FOR SCREENING FOR LUNG CANCER: ICD-10-CM

## 2021-08-17 DIAGNOSIS — Z87.891 PERSONAL HISTORY OF NICOTINE DEPENDENCE: ICD-10-CM

## 2021-08-17 PROCEDURE — 71271 CT THORAX LUNG CANCER SCR C-: CPT

## 2021-08-18 ENCOUNTER — TELEPHONE (OUTPATIENT)
Dept: PAIN MEDICINE | Facility: CLINIC | Age: 51
End: 2021-08-18

## 2021-08-18 ENCOUNTER — CONSULT (OUTPATIENT)
Dept: PAIN MEDICINE | Facility: CLINIC | Age: 51
End: 2021-08-18
Payer: COMMERCIAL

## 2021-08-18 VITALS
SYSTOLIC BLOOD PRESSURE: 115 MMHG | WEIGHT: 170 LBS | BODY MASS INDEX: 32.1 KG/M2 | HEART RATE: 87 BPM | DIASTOLIC BLOOD PRESSURE: 75 MMHG | HEIGHT: 61 IN

## 2021-08-18 DIAGNOSIS — M54.41 CHRONIC BILATERAL LOW BACK PAIN WITH BILATERAL SCIATICA: ICD-10-CM

## 2021-08-18 DIAGNOSIS — M53.3 SACROILIAC PAIN: Primary | ICD-10-CM

## 2021-08-18 DIAGNOSIS — M54.42 CHRONIC BILATERAL LOW BACK PAIN WITH BILATERAL SCIATICA: ICD-10-CM

## 2021-08-18 DIAGNOSIS — G89.29 CHRONIC BILATERAL LOW BACK PAIN WITH BILATERAL SCIATICA: ICD-10-CM

## 2021-08-18 PROCEDURE — 99244 OFF/OP CNSLTJ NEW/EST MOD 40: CPT | Performed by: STUDENT IN AN ORGANIZED HEALTH CARE EDUCATION/TRAINING PROGRAM

## 2021-08-18 RX ORDER — TIZANIDINE 2 MG/1
4 TABLET ORAL 2 TIMES DAILY PRN
Qty: 60 TABLET | Refills: 0 | Status: SHIPPED | OUTPATIENT
Start: 2021-08-18 | End: 2021-09-20

## 2021-08-18 NOTE — TELEPHONE ENCOUNTER
Dave * [address] [place] [address] called in regard to the medication tiZANidine (ZANAFLEX) 2 mg tablet if that will be replacing methocarbamol (ROBAXIN) 500 mg tablet   Please be advised thank you    Dusty Mclain can be reached @ [phone]

## 2021-08-18 NOTE — TELEPHONE ENCOUNTER
S/W [facility] as per below  Advised Dr Adrianne Decker ordered Tizanidine to replace Methocarbamol
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
## 2021-08-23 ENCOUNTER — OFFICE VISIT (OUTPATIENT)
Dept: INTERNAL MEDICINE CLINIC | Facility: CLINIC | Age: 51
End: 2021-08-23
Payer: COMMERCIAL

## 2021-08-23 VITALS
DIASTOLIC BLOOD PRESSURE: 82 MMHG | TEMPERATURE: 97.5 F | RESPIRATION RATE: 16 BRPM | WEIGHT: 155.8 LBS | HEIGHT: 61 IN | BODY MASS INDEX: 29.42 KG/M2 | HEART RATE: 84 BPM | OXYGEN SATURATION: 96 % | SYSTOLIC BLOOD PRESSURE: 126 MMHG

## 2021-08-23 DIAGNOSIS — K21.9 GASTROESOPHAGEAL REFLUX DISEASE WITHOUT ESOPHAGITIS: Chronic | ICD-10-CM

## 2021-08-23 DIAGNOSIS — F25.0 SCHIZOAFFECTIVE DISORDER, BIPOLAR TYPE (HCC): Chronic | ICD-10-CM

## 2021-08-23 DIAGNOSIS — M25.541 JOINT PAIN IN BOTH HANDS: ICD-10-CM

## 2021-08-23 DIAGNOSIS — K59.01 SLOW TRANSIT CONSTIPATION: ICD-10-CM

## 2021-08-23 DIAGNOSIS — J45.20 MILD INTERMITTENT ASTHMA WITHOUT COMPLICATION: Chronic | ICD-10-CM

## 2021-08-23 DIAGNOSIS — M25.542 JOINT PAIN IN BOTH HANDS: ICD-10-CM

## 2021-08-23 DIAGNOSIS — Z72.0 TOBACCO ABUSE: ICD-10-CM

## 2021-08-23 DIAGNOSIS — E55.9 VITAMIN D DEFICIENCY: Primary | ICD-10-CM

## 2021-08-23 DIAGNOSIS — M51.36 DEGENERATIVE DISC DISEASE, LUMBAR: ICD-10-CM

## 2021-08-23 DIAGNOSIS — R05.9 COUGH: ICD-10-CM

## 2021-08-23 PROCEDURE — 99214 OFFICE O/P EST MOD 30 MIN: CPT | Performed by: NURSE PRACTITIONER

## 2021-08-23 RX ORDER — MENTHOL 5.8 MG
1 LOZENGE MUCOUS MEMBRANE EVERY 2 HOUR PRN
Qty: 100 LOZENGE | Refills: 1 | Status: SHIPPED | OUTPATIENT
Start: 2021-08-23

## 2021-08-23 RX ORDER — GLUCOSAM/CHON-MSM1/C/MANG/BOSW 750-644 MG
1 TABLET ORAL 2 TIMES DAILY
Qty: 180 TABLET | Refills: 1 | Status: SHIPPED | OUTPATIENT
Start: 2021-08-23 | End: 2021-12-20

## 2021-08-23 RX ORDER — BENZONATATE 100 MG/1
100 CAPSULE ORAL 2 TIMES DAILY PRN
Qty: 30 CAPSULE | Refills: 0 | Status: SHIPPED | OUTPATIENT
Start: 2021-08-23 | End: 2021-12-27 | Stop reason: SDUPTHER

## 2021-08-23 RX ORDER — MULTIVIT-MIN/IRON/FOLIC ACID/K 18-600-40
2000 CAPSULE ORAL DAILY
Qty: 90 CAPSULE | Refills: 2 | Status: SHIPPED | OUTPATIENT
Start: 2021-08-23 | End: 2022-03-11

## 2021-08-23 NOTE — PATIENT INSTRUCTIONS
Vitamin D Deficiency   AMBULATORY CARE:   Vitamin D deficiency  is a low level of vitamin D in your body  Vitamin D helps your body absorb calcium from foods  Your body makes vitamin D when your skin is exposed to sunlight  You can also get vitamin D from certain foods  Most of the vitamin D in your body comes from sunlight exposure  Common symptoms include the following:  Low levels of vitamin D can lead to weak and brittle bones that are more likely to fracture  You may not have any signs and symptoms, or you may have any of the following:  · Bone pain or discomfort in your lower back, pelvis, or legs    · Muscle aches and weakness    · Low back pain in women    · Poor growth, irritability, and frequent respiratory tract infections in infants    · Deformed bones and slow growth in children    Call your doctor or dietitian if:   · You continue to have symptoms, or your symptoms get worse  · You think you took too much of a vitamin D supplement, and you have nausea, vomiting, or a headache  · You have questions or concerns about your condition or care  Treatment for vitamin D deficiency  includes high doses of vitamin D for 8 to 12 weeks to increase your levels  Your levels will then be rechecked  If your levels are still low, you will need to take vitamin D supplements for another 8 weeks  After your levels have gone back to normal, you may need to continue to take a vitamin D supplement  Amount of vitamin D do you need each day:  The amount of vitamin D you need depends on your age  You may need more than the recommended amounts below if you take certain medicines or you are obese  Ask your healthcare provider how much vitamin D you need  · Infants up to 1 year of age: 0 international units (IU)    · Children 1 year and older: 600 IU    · Adults aged 23to 79years old: 600 IU    · Adults older than 70 years: 800 IU    Prevent vitamin D deficiency:   · Eat foods that are high in vitamin D    Fatty fish such as mackerel, canned tuna and sardines, and salmon are good sources of vitamin D  Eggs, almonds, and meat such as liver are also good sources  Certain foods such as milk, juice, and cereal are fortified with vitamin D          · Give your  infant a vitamin D supplement  of 400 IU each day  · Take vitamin D supplements as directed  High doses of vitamin D can be toxic  Your healthcare provider will tell you how much vitamin D you should take each day  Vitamin D is best absorbed when taken with food  · Expose your skin to sunlight as directed  Ask your healthcare provider how you can safely expose your skin to sunlight and for how long  Too much exposure to sunlight can cause skin cancer  Follow up with your doctor or dietitian as directed:  Write down your questions so you remember to ask them during your visits  © Copyright [employer] 2021 Information is for End User's use only and may not be sold, redistributed or otherwise used for commercial purposes  All illustrations and images included in CareNotes® are the copyrighted property of A D A M , Inc  or [facility] Melony Awan   The above information is an  only  It is not intended as medical advice for individual conditions or treatments  Talk to your doctor, nurse or pharmacist before following any medical regimen to see if it is safe and effective for you  Cigarette Smoking and Your Health   AMBULATORY CARE:   Risks to your health if you smoke:  Nicotine and other chemicals found in tobacco and e-cigarettes can damage every cell in your body  Even if you are a light smoker, you have an increased risk for cancer, heart disease, and lung disease  If you are pregnant or have diabetes, smoking increases your risk for complications  Nicotine can affect an adolescent's developing brain  This can lead to trouble thinking, learning, or paying attention    Benefits to your health if you stop smoking:   · You decrease respiratory symptoms such as coughing, wheezing, and shortness of breath  · You reduce your risk for cancers of the lung, mouth, throat, kidney, bladder, pancreas, stomach, and cervix  If you already have cancer, you increase the benefits of chemotherapy  You also reduce your risk for cancer returning or a second cancer from developing  · You reduce your risk for heart disease, blood clots, heart attack, and stroke  · You reduce your risk for lung infections, and diseases such as pneumonia, asthma, chronic bronchitis, and emphysema  · Your circulation improves  More oxygen can be delivered to your body  If you have diabetes, you lower your risk for complications, such as kidney, artery, and eye diseases  You also lower your risk for nerve damage  Nerve damage can lead to amputations, poor vision, and blindness  · You improve your body's ability to heal and to fight infections  · An adolescent can help his or her brain and body develop in a healthy way  Talk to your adolescent about all the health risks of nicotine  If you can, start talking about nicotine when your child is younger than 12 years  This may make it easier for him or her not to start using nicotine as a teenager or adult  Explain to him or her that it is best never to start  It can be hard to try to quit later  Benefits to the health of others if you stop smoking:  Tobacco is harmful to nonsmokers who breathe in your secondhand smoke  The following are ways the health of others around you may improve when you stop smoking:  · You lower the risks for lung cancer and heart disease in nonsmoking adults  · If you are pregnant, you lower the risk for miscarriage, early delivery, low birth weight, and stillbirth  You also lower your baby's risk for SIDS, obesity, developmental delay, and neurobehavioral problems, such as ADHD      · If you have children, you lower their risk for ear infections, colds, pneumonia, bronchitis, and asthma  Follow up with your doctor as directed:  Write down your questions so you remember to ask them during your visits  For support and more information:   · [employer]  [address] [facility]  [place] , [address] [place] [address]  Phone: [facility] [address]  Phone: 8- 006 - [phone]  Web Address: [place] Laineztesha connell    · Smokefree  gov  Phone: 5- 431 - 975-4907  Web Address: www smokefree  [address] 2021 Information is for End User's use only and may not be sold, redistributed or otherwise used for commercial purposes  All illustrations and images included in CareNotes® are the copyrighted property of A D A M , Inc  or [address] [place] [address]  The above information is an  only  It is not intended as medical advice for individual conditions or treatments  Talk to your doctor, nurse or pharmacist before following any medical regimen to see if it is safe and effective for you

## 2021-08-23 NOTE — PROGRESS NOTES
[facility]    NAME: Martin Holbrook  AGE: 49 y o  SEX: female  : 1970     DATE: 2021     Assessment and Plan:     Problem List Items Addressed This Visit        Digestive    Gastroesophageal reflux disease (Chronic)     ·  The patient continues to follow with Gastroenterology  Anti-reflux measures such as raising the head of the bed, avoiding tight clothing or belts, avoiding eating late at night and not lying down shortly after mealtime and achieving weight loss are discussed  Avoid ASA, NSAID's, caffeine, peppermints, alcohol and tobacco    · Plan  ? Continue pantoprazole 40 mg PO daily          Slow transit constipation       Patient continues to follow with Gastroenterology  She does continue with her Metamucil  Respiratory    Mild intermittent asthma without complication (Chronic)       The patient has had no acute exacerbations of her asthma  She continues on her Proventil inhaler p r n               Musculoskeletal and Integument    Degenerative disc disease, lumbar       The patient did follow with pain management  They did recommend aquatic therapy at this time  She will start that in 1 week  She will follow-up with pain management and if the aquatic therapy does not help her pain she will receive injections  Other    Schizoaffective disorder, bipolar type ([address] ) (Chronic)       Continue to follow with psychiatry  Tobacco abuse       The patient continues to smoke a pack per day  I did caution the patient regarding the amount of cigarettes she is smoking a day  She recently had a CT scan lung cancer screening which is still pending  She does have a cough which started approximately 1 week ago  She has no other symptoms  Radha Rodas were sent to her pharmacy    Smoking cessation again was recommended         Vitamin D deficiency - Primary     I Did recommend the patient start on vitamin-D 3 2000 units daily over-the-counter  Information was provided to the patient regarding vitamin-D deficiency    Component      Latest Ref Rng & Units 8/9/2021   Vit D, 25-Hydroxy      30 0 - 100 0 ng/mL 22 9 (L)            Relevant Medications    Cholecalciferol (Vitamin D) 50 MCG (2000 UT) CAPS      Other Visit Diagnoses     Joint pain in both hands        Relevant Medications    Misc Natural Products (Osteo Bi-Flex Adv Triple St) TABS    Cough        Relevant Medications    benzonatate (TESSALON PERLES) 100 mg capsule    Menthol ([place] Cough Drops) 5 8 MG LOZG            Return in about 4 months (around 12/23/2021) for Madan Schmid, recheck  Chief Complaint:     Chief Complaint   Patient presents with    Follow-up     2 weeks        History of Present Illness:      Patrick Daniels returns to the office today for follow-up  Recent blood work was reviewed with the patient  She will start on vitamin-D over-the-counter  Smoking cessation was again discussed with the patient she is not interested  She does have a new cough and Tessalon Perles were sent to the pharmacy  Her CT scan lung cancer screening results are still pending  I will contact her when these are final   She will continue to follow with psychiatry and pain management  She will be starting aquatic therapy in the next week or 2  I will see the patient back in the office in 4 months  Review of Systems:     Review of Systems   Constitutional: Negative for activity change, fatigue and fever  HENT: Negative for congestion, hearing loss, rhinorrhea, trouble swallowing and voice change  Eyes: Negative for photophobia, pain, discharge and visual disturbance  Respiratory: Positive for cough  Negative for chest tightness and shortness of breath  Cardiovascular: Negative for chest pain, palpitations and leg swelling  Gastrointestinal: Positive for constipation  Negative for abdominal pain, blood in stool, nausea and vomiting     Endocrine: Negative for cold intolerance and heat intolerance  Genitourinary: Negative for difficulty urinating, frequency, hematuria, urgency, vaginal bleeding and vaginal discharge  Musculoskeletal: Positive for arthralgias and back pain  Negative for myalgias  Skin: Negative  Neurological: Negative for dizziness, weakness, numbness and headaches  Psychiatric/Behavioral: Negative for decreased concentration  The patient is not nervous/anxious  Problem List:     Patient Active Problem List   Diagnosis    Schizoaffective disorder, bipolar type ([address] )    LYNN (generalized anxiety disorder)    Slow transit constipation    Degenerative disc disease, lumbar    Post-traumatic stress disorder, chronic    Mild intermittent asthma without complication    Tobacco abuse    Gastroesophageal reflux disease    Chlamydial cervicitis    Otitis media    Vitamin D deficiency        Objective:     /82 (BP Location: Left arm, Patient Position: Sitting, Cuff Size: Standard)   Pulse 84   Temp 97 5 °F (36 4 °C) (Temporal) Comment: NO NSAIDS  Resp 16   Ht 5' 1" (1 549 m)   Wt 70 7 kg (155 lb 12 8 oz)   SpO2 96%   BMI 29 44 kg/m²     Physical Exam  Vitals reviewed  Constitutional:       Appearance: Normal appearance  She is obese  HENT:      Head: Normocephalic  Nose: Nose normal       Mouth/Throat:      Mouth: Mucous membranes are moist       Pharynx: Oropharynx is clear  Eyes:      Extraocular Movements: Extraocular movements intact  Pupils: Pupils are equal, round, and reactive to light  Cardiovascular:      Rate and Rhythm: Normal rate and regular rhythm  Pulmonary:      Effort: Pulmonary effort is normal       Breath sounds: Normal breath sounds  Musculoskeletal:         General: Normal range of motion  Skin:     General: Skin is warm and dry  Neurological:      General: No focal deficit present  Mental Status: She is alert and oriented to person, place, and time     Psychiatric:         Mood and Affect: Mood normal          Behavior: Behavior normal          Thought Content:  Thought content normal          Judgment: Judgment normal          Corinne Felix, [id number] [facility]

## 2021-08-23 NOTE — ASSESSMENT & PLAN NOTE
I Did recommend the patient start on vitamin-D 3 2000 units daily over-the-counter    Information was provided to the patient regarding vitamin-D deficiency    Component      Latest Ref Rng & Units 8/9/2021   Vit D, 25-Hydroxy      30 0 - 100 0 ng/mL 22 9 (L)

## 2021-08-23 NOTE — ASSESSMENT & PLAN NOTE
The patient has had no acute exacerbations of her asthma  She continues on her Proventil inhaler p r n  Nate Miller

## 2021-08-23 NOTE — ASSESSMENT & PLAN NOTE
The patient did follow with pain management  They did recommend aquatic therapy at this time  She will start that in 1 week  She will follow-up with pain management and if the aquatic therapy does not help her pain she will receive injections

## 2021-08-24 ENCOUNTER — TELEPHONE (OUTPATIENT)
Dept: PAIN MEDICINE | Facility: CLINIC | Age: 51
End: 2021-08-24

## 2021-08-25 ENCOUNTER — TELEPHONE (OUTPATIENT)
Dept: INTERNAL MEDICINE CLINIC | Facility: CLINIC | Age: 51
End: 2021-08-25

## 2021-08-25 NOTE — TELEPHONE ENCOUNTER
----- Message from Nasreen Mata [place] sent at 8/25/2021  3:26 PM EDT -----    Please let the patient know that her CT scan lung screening was stable when compared to her previous lung scans  We will repeat in 1 year  She should consider smoking cessation at this time

## 2021-08-28 NOTE — ASSESSMENT & PLAN NOTE
[facility] Progress Note      [place] is a 50 year old male at Hospital  LOS: 5 days     Subjective:   Follow-up hyponatremia  Labs pending from today  Patient not verbalizing  Patient not eating  Has a sitter    ROS unobtainable      Objective:     Visit Vitals  BP (!) 159/92 (BP Location: LUE - Left upper extremity, Patient Position: Supine)   Pulse 98   Temp 97.3 °F (36.3 °C) (Axillary)   Resp 18   Ht 5' 7.2\" (1.707 m)   Wt 60.4 kg (133 lb 2.5 oz)   SpO2 96%   BMI 20.73 kg/m²         I&O:     Intake/Output Summary (Last 24 hours) at 8/28/2021 1043  Last data filed at 8/28/2021 0800  Gross per 24 hour   Intake --   Output 1250 ml   Net -1250 ml       General appearance   awake alert NAD does not answer questions   Lungs   clear to auscultation bilaterally,   Heart  regular rate and rhythm, no murmur, abdomen soft flat nontender   Extremities  no peripheral edema, no rash       Current Facility-Administered Medications   Medication Dose Route Frequency Provider Last Rate Last Admin   • magnesium sulfate 2 g in 50 mL premix IVPB  2 g Intravenous Once Prashant Ash MD 25 mL/hr at 08/28/21 1041 2 g at 08/28/21 1041   • potassium CHLORIDE (KLOR-CON M) norberto ER tablet 40 mEq  40 mEq Oral Once Prashant Ash MD       • haloperidol lactate (HALDOL) 5 MG/ML injection 2 mg  2 mg Intravenous Q4H PRN Prashant Ash MD   2 mg at 08/26/21 1032   • pantoprazole (PROTONIX) EC tablet 40 mg  40 mg Oral 2 times per day Prashant Ash MD   40 mg at 08/27/21 2023   • diazePAM (VALIUM) tablet 5 mg  5 mg Oral TID Prashant Ash MD   5 mg at 08/27/21 2023   • thiamine (VITAMIN B1) tablet 100 mg  100 mg Oral Daily Prashant Ash MD   100 mg at 08/27/21 0822   • folic acid (FOLATE) tablet 1 mg  1 mg Oral Daily Prashant Ash MD   1 mg at 08/27/21 0822   • sodium chloride 0.9% infusion   Intravenous Continuous Xochilt K [place], MD 75 mL/hr at 08/27/21 0824 Rate Change at 08/27/21 0824  · Nicotine patch   • amLODIPine (NORVASC) tablet 5 mg  5 mg Oral Daily Prashant Ash MD   5 mg at 08/27/21 0822   • sodium chloride 0.9 % flush bag 25 mL  25 mL Intravenous PRN Dakota Perrin MD       • sodium chloride (PF) 0.9 % injection 2 mL  2 mL Intracatheter 2 times per day Dakota Perrin MD   2 mL at 08/25/21 0910   • Potassium Standard Replacement Protocol   Does not apply See Admin Instructions Dakota Perrin MD       • Magnesium Standard Replacement Protocol   Does not apply See Admin Instructions Dakota Perrin MD       • ondansetron (ZOFRAN) injection 4 mg  4 mg Intravenous BID PRN Dakota Perrin MD   4 mg at 08/25/21 1012   • prochlorperazine (COMPAZINE) injection 5 mg  5 mg Intravenous Q4H PRN Dakota Perrin MD       • acetaminophen (TYLENOL) tablet 650 mg  650 mg Oral Q4H PRN Dakota Perrin MD   650 mg at 08/24/21 0806   • HYDROcodone-acetaminophen (NORCO) 5-325 MG per tablet 1 tablet  1 tablet Oral Q4H PRN Dakota Perrin MD       • docusate sodium-sennosides (SENOKOT S) 50-8.6 MG 2 tablet  2 tablet Oral Daily PRN Dakota Perrin MD       • aluminum-magnesium hydroxide-simethicone (MAALOX) 200-200-20 MG/5ML suspension 20 mL  20 mL Oral Q4H PRN Dakota Perrin MD       • sodium chloride 0.9 % flush bag 25 mL  25 mL Intravenous PRN Dakota Perrin MD       • LORazepam (ATIVAN) injection 2 mg  2 mg Intravenous Q1H PRN Dakota Perrin MD   2 mg at 08/27/21 1217    Or   • LORazepam (ATIVAN) injection 2 mg  2 mg Intramuscular Q1H PRN Dakota Perrin MD   2 mg at 08/25/21 1811        Lab Results:   Recent Labs     08/26/21  0542 08/26/21  1323 08/27/21  0406 08/27/21  1434 08/27/21  2053 08/28/21  0546   SODIUM 123* 122* 126* 128* 126* 126*   POTASSIUM 3.4 3.3* 3.7  --   --  3.6   CHLORIDE 86* 89* 93*  --   --  92*   GLUCOSE 103* 110* 97  --   --  92   BUN 5* 7 7  --   --  6   CREATININE 0.60* 0.81 0.70  --   --  0.70   CALCIUM 8.6 8.7 8.9  --   --  8.4   ALBUMIN 3.2*  --  3.3*  --   --  3.0*      CBC:   Lab  Results   Component Value Date    WBC 5.4 08/27/2021    WBC 3.9 (L) 06/20/2020    RBC 4.04 (L) 08/27/2021    RBC 3.65 (L) 06/20/2020     Coagulation:   Lab Results   Component Value Date    INR 1.0 08/23/2021    INR 1.0 07/16/2019    INR  07/16/2019     INR Therapeutic Range: 2.0 to 3.0 (2.5 to 3.5 recommended for recurrent thrombotic episodes and mechanical prosthetic heart valves.)    PTT 30 08/23/2021    PTT 31 07/14/2019    PTT PTT  Therapeutic Range:  45-70 seconds. 07/14/2019    PTT NOT APPLICABLE 07/14/2019     Cardiac markers:   Lab Results   Component Value Date     07/05/2021     (H) 09/01/2014           Assessment:   [place]acs is a 50 year old male admitted for etoh intoxication     Acute hypoosmolar hyponatremia, appears euvolemic  Labs pending from today, to order stat and for tomorrow  -recurrent.  Pt with Hx of this in the past.  -presumed in the past to be due to beer potomania and liver cirrhosis  -Cr on admit was normal at 128, increased to 138 (?lab error) and has been progressive declining since admit.  -Urine osmolality inappropriately elevated  - TSH, slightly elevated  -check uric acid level  -fluid restriction as ordered  -careful IVF replacement with isotonic saline at 50 ml/hr to avoid overly rapid correction  -agree with q4 hrs sodium levels for now  -if no improvement or sodium continues to decline, will need to consider DDAVP/ D5W therapy  -neg drug screen on admit  -Strict I/O, daily weights  -Monitor chemistries as ordered     Bladder distention/ mild hydro  -noted on CT  -monitor I/O, if inadequate will potentially need macdonald  -check bladder scan     ETOH intoxication/withdrawal/ ETOH cirrhosis  -per primary service  -DT precautions     HTN  -Stable     Hx CVA  -per primary service     Esophagitis/coffee ground emesis  -per GI/ primary service     Recommendations:     -as above        Thanks for the consultation.  Will follow closely with you.      Plan:   -Strict  I/O, daily weights  -Renal dosing of all meds  -Monitor chemistries as ordered  -Avoid NSAIDs/ IV Contrast/ ACE/ARB    Thanks for the consultation.  Will follow closely with you.    By:  Wadah A Atassi, MD,  8/28/2021 10:43 AM

## 2021-08-30 NOTE — PATIENT INSTRUCTIONS
Breast Self Exam for Women   AMBULATORY CARE:   A breast self-exam (BSE)  is a way to check your breasts for lumps and other changes  Regular BSEs can help you know how your breasts normally look and feel  Most breast lumps or changes are not cancer, but you should always have them checked by a healthcare provider  Why you should do a BSE:  Breast cancer is the most common type of cancer in women  Even if you have mammograms, you may still want to do a BSE regularly  If you know how your breasts normally feel and look, it may help you know when to contact your healthcare provider  Mammograms can miss some cancers  You may find a lump during a BSE that did not show up on a mammogram   When you should do a BSE:  If you have periods, you may want to do your BSE 1 week after your period ends  This is the time when your breasts may be the least swollen, lumpy, or tender  You can do regular BSEs even if you are breastfeeding or have breast implants  Call your doctor if:   · You find any lumps or changes in your breasts  · You have breast pain or fluid coming from your nipples  · You have questions or concerns about your condition or care  How to do a BSE:       · Look at your breasts in a mirror  Look at the size and shape of each breast and nipple  Check for swelling, lumps, dimpling, scaly skin, or other skin changes  Look for nipple changes, such as a nipple that is painful or beginning to pull inward  Gently squeeze both nipples and check to see if fluid (that is not breast milk) comes out of them  If you find any of these or other breast changes, contact your healthcare provider  Check your breasts while you sit or  the following 3 positions:    ? Hang your arms down at your sides  ? Raise your hands and join them behind your head  ? Put firm pressure with your hands on your hips  Bend slightly forward while you look at your breasts in the mirror  · Lie down and feel your breasts    When you lie down, your breast tissue spreads out evenly over your chest  This makes it easier for you to feel for lumps and anything that may not be normal for your breasts  Do a BSE on one breast at a time  ? Place a small pillow or towel under your left shoulder  Put your left arm behind your head  ? Use the 3 middle fingers of your right hand  Use your fingertip pads, on the top of your fingers  Your fingertip pad is the most sensitive part of your finger  ? Use small circles to feel your breast tissue  Use your fingertip pads to make dime-sized, overlapping circles on your breast and armpits  Use light, medium, and firm pressure  First, press lightly  Second, press with medium pressure to feel a little deeper into the breast  Last, use firm pressure to feel deep within your breast     ? Examine your entire breast area  Examine the breast area from above the breast to below the breast where you feel only ribs  Make small circles with your fingertips, starting in the middle of your armpit  Make circles going up and down the breast area  Continue toward your breast and all the way across it  Examine the area from your armpit all the way over to the middle of your chest (breastbone)  Stop at the middle of your chest     ? Move the pillow or towel to your right shoulder, and put your right arm behind your head  Use the 3 fingertip pads of your left hand, and repeat the above steps to do a BSE on your right breast   What else you can do to check for breast problems or cancer:  Talk to your healthcare provider about mammograms  A mammogram is an x-ray of your breasts to screen for breast cancer or other problems  Your provider can tell you the benefits and risks of mammograms  The first mammogram is usually at age 39 or 48  Your provider may recommend you start at 36 or younger if your risk for breast cancer is high  Mammograms usually continue every 1 to 2 years until age 76       Follow up with your doctor as directed:  Write down your questions so you remember to ask them during your visits  © Copyright [employer] 2021 Information is for End User's use only and may not be sold, redistributed or otherwise used for commercial purposes  All illustrations and images included in CareNotes® are the copyrighted property of [employer] D A M [employer]  or Daryl Laguerre  The above information is an  only  It is not intended as medical advice for individual conditions or treatments  Talk to your doctor, nurse or pharmacist before following any medical regimen to see if it is safe and effective for you  Wellness Visit for Adults   AMBULATORY CARE:   A wellness visit  is when you see your healthcare provider to get screened for health problems  Your healthcare provider will also give you advice on how to stay healthy  Write down your questions so you remember to ask them  Ask your healthcare provider how often you should have a wellness visit  What happens at a wellness visit:  Your healthcare provider will ask about your health, and your family history of health problems  This includes high blood pressure, heart disease, and cancer  He or she will ask if you have symptoms that concern you, if you smoke, and about your mood  You may also be asked about your intake of medicines, supplements, food, and alcohol  Any of the following may be done:  · Your weight  will be checked  Your height may also be checked so your body mass index (BMI) can be calculated  Your BMI shows if you are at a healthy weight  · Your blood pressure  and heart rate will be checked  Your temperature may also be checked  · Blood and urine tests  may be done  Blood tests may be done to check your cholesterol levels  Abnormal cholesterol levels increase your risk for heart disease and stroke  You may also need a blood or urine test to check for diabetes if you are at increased risk   Urine tests may be done to look for signs of an infection or kidney disease  · A physical exam  includes checking your heartbeat and lungs with a stethoscope  Your healthcare provider may also check your skin to look for sun damage  · Screening tests  may be recommended  A screening test is done to check for diseases that may not cause symptoms  The screening tests you may need depend on your age, gender, family history, and lifestyle habits  For example, colorectal screening may be recommended if you are 48years old or older  Screening tests you need if you are a woman:   · A Pap smear  is used to screen for cervical cancer  Pap smears are usually done every 3 to 5 years depending on your age  You may need them more often if you have had abnormal Pap smear test results in the past  Ask your healthcare provider how often you should have a Pap smear  · A mammogram  is an x-ray of your breasts to screen for breast cancer  Experts recommend mammograms every 2 years starting at age 48 years  You may need a mammogram at age 52 years or younger if you have an increased risk for breast cancer  Talk to your healthcare provider about when you should start having mammograms and how often you need them  Vaccines you may need:   · Get an influenza vaccine  every year  The influenza vaccine protects you from the flu  Several types of viruses cause the flu  The viruses change over time, so new vaccines are made each year  · Get a tetanus-diphtheria (Td) booster vaccine  every 10 years  This vaccine protects you against tetanus and diphtheria  Tetanus is a severe infection that may cause painful muscle spasms and lockjaw  Diphtheria is a severe bacterial infection that causes a thick covering in the back of your mouth and throat  · Get a human papillomavirus (HPV) vaccine  if you are female and aged 23 to 32 or male 23 to 24 and never received it  This vaccine protects you from HPV infection  HPV is the most common infection spread by sexual contact   HPV may also cause vaginal, penile, and anal cancers  · Get a pneumococcal vaccine  if you are aged 72 years or older  The pneumococcal vaccine is an injection given to protect you from pneumococcal disease  Pneumococcal disease is an infection caused by pneumococcal bacteria  The infection may cause pneumonia, meningitis, or an ear infection  · Get a shingles vaccine  if you are 60 or older, even if you have had shingles before  The shingles vaccine is an injection to protect you from the varicella-zoster virus  This is the same virus that causes chickenpox  Shingles is a painful rash that develops in people who had chickenpox or have been exposed to the virus  How to eat healthy:  My Plate is a model for planning healthy meals  It shows the types and amounts of foods that should go on your plate  Fruits and vegetables make up about half of your plate, and grains and protein make up the other half  A serving of dairy is included on the side of your plate  The amount of calories and serving sizes you need depends on your age, gender, weight, and height  Examples of healthy foods are listed below:  · Eat a variety of vegetables  such as dark green, red, and orange vegetables  You can also include canned vegetables low in sodium (salt) and frozen vegetables without added butter or sauces  · Eat a variety of fresh fruits , canned fruit in 100% juice, frozen fruit, and dried fruit  · Include whole grains  At least half of the grains you eat should be whole grains  Examples include whole-wheat bread, wheat pasta, brown rice, and whole-grain cereals such as oatmeal     · Eat a variety of protein foods such as seafood (fish and shellfish), lean meat, and poultry without skin (turkey and chicken)  Examples of lean meats include pork leg, shoulder, or tenderloin, and beef round, sirloin, tenderloin, and extra lean ground beef   Other protein foods include eggs and egg substitutes, beans, peas, soy products, nuts, and seeds     · Choose low-fat dairy products such as skim or 1% milk or low-fat yogurt, cheese, and cottage cheese  · Limit unhealthy fats  such as butter, hard margarine, and shortening  Exercise:  Exercise at least 30 minutes per day on most days of the week  Some examples of exercise include walking, biking, dancing, and swimming  You can also fit in more physical activity by taking the stairs instead of the elevator or parking farther away from stores  Include muscle strengthening activities 2 days each week  Regular exercise provides many health benefits  It helps you manage your weight, and decreases your risk for type 2 diabetes, heart disease, stroke, and high blood pressure  Exercise can also help improve your mood  Ask your healthcare provider about the best exercise plan for you  General health and safety guidelines:   · Do not smoke  Nicotine and other chemicals in cigarettes and cigars can cause lung damage  Ask your healthcare provider for information if you currently smoke and need help to quit  E-cigarettes or smokeless tobacco still contain nicotine  Talk to your healthcare provider before you use these products  · Limit alcohol  A drink of alcohol is 12 ounces of beer, 5 ounces of wine, or 1½ ounces of liquor  · Lose weight, if needed  Being overweight increases your risk of certain health conditions  These include heart disease, high blood pressure, type 2 diabetes, and certain types of cancer  · Protect your skin  Do not sunbathe or use tanning beds  Use sunscreen with a SPF 15 or higher  Apply sunscreen at least 15 minutes before you go outside  Reapply sunscreen every 2 hours  Wear protective clothing, hats, and sunglasses when you are outside  · Drive safely  Always wear your seatbelt  Make sure everyone in your car wears a seatbelt  A seatbelt can save your life if you are in an accident  Do not use your cell phone when you are driving   This could distract you and cause an accident  Pull over if you need to make a call or send a text message  · Practice safe sex  Use latex condoms if are sexually active and have more than one partner  Your healthcare provider may recommend screening tests for sexually transmitted infections (STIs)  · Wear helmets, lifejackets, and protective gear  Always wear a helmet when you ride a bike or motorcycle, go skiing, or play sports that could cause a head injury  Wear protective equipment when you play sports  Wear a lifejacket when you are on a boat or doing water sports  © Copyright [employer] 2021 Information is for End User's use only and may not be sold, redistributed or otherwise used for commercial purposes  All illustrations and images included in CareNotes® are the copyrighted property of [employer] M [employer] Inc  or Daryl Laguerre  The above information is an  only  It is not intended as medical advice for individual conditions or treatments  Talk to your doctor, nurse or pharmacist before following any medical regimen to see if it is safe and effective for you  Kegel Exercises for Women   AMBULATORY CARE:   Kegel exercises  help strengthen your pelvic muscles  Pelvic muscles hold your pelvic organs, such as your bladder and uterus, in place  Kegel exercises help prevent or control problems with urine incontinence (leakage)  Incontinence may be caused by pregnancy, childbirth, or menopause  Contact your healthcare provider if:   · You cannot feel your muscles tighten or relax  · You continue to leak urine  · You have questions or concerns about your condition or care  Use the correct muscles:  Pelvic muscles are the muscles you use to control urine flow  To target these muscles, stop and start the flow of urine several times  This will help you become familiar with how it feels to tighten and relax these muscles  How to do Kegel exercises:   · Empty your bladder   You may lie down, stand up, or sit down to do these exercises  When you first try to do these exercises, it may be easier if you lie down  Tighten or squeeze your pelvic muscles slowly  It may feel like you are trying to hold back urine or gas  Hold this position for 3 seconds  Relax for 3 seconds  Repeat this cycle 10 times  · Do 10 sets of Kegel exercises, at least 3 times a day  Do not hold your breath when you do Kegel exercises  Keep your stomach, back, and leg muscles relaxed  · As your muscles get stronger, you will be able to hold the squeeze longer  Your healthcare provider may ask that you increase your pelvic muscle squeeze to 10 seconds  After you squeeze for 10 seconds, relax for 10 seconds  What else you should know:   · Once you know how to do Kegel exercises, use different positions  You can do these exercises while you lie on the floor, sit at your desk or watch TV, and while you stand  · You may notice improved bladder control within about 6 weeks  · Tighten your pelvic muscles before you sneeze, cough, or lift to prevent urine leakage  Follow up with your healthcare provider as directed:  Write down your questions so you remember to ask them during your visits  © Copyright [employer] 2021 Information is for End User's use only and may not be sold, redistributed or otherwise used for commercial purposes  All illustrations and images included in CareNotes® are the copyrighted property of [employer] M , Inc  or [employer]   The above information is an  only  It is not intended as medical advice for individual conditions or treatments  Talk to your doctor, nurse or pharmacist before following any medical regimen to see if it is safe and effective for you  For vaginal dryness: You may use:     Coconut oil (organic, pure, unscented) as needed for moisture or lubrication   ( Do not use if allergic)       Replens moisture restore external comfort gel daily ( use as directed on the box) Replens long lasting vaginal moisturizer  ( use as directed on the box)       For Vaginal Lubrication:        You may use:     Coconut oil (organic, pure, unscented) as needed for lubrication during intercourse  (Do not use if allergic)               Replens silky smooth lubricant, premium silicone based lubricant for intercourse  ( use as directed, a small amount will provide an enhanced natural feeling)     Any premium over the counter vaginal lubricant water or silicone based  Silicone based will have more staying power  For Vulvar hygiene:     No soaps or feminine wash to the vulva with the exception of Dove or Dove Sensitive Skin bar soap if necessary  Only perfume-free, dye-free laundry detergent, use a second rinse cycle  Avoid fabric softeners/dryer sheets  No lotion to the area  No Douching   Coconut oil as a lubricant (if not using condoms) or another scent-free premium lubricant  Loose fitting cotton underwear and loose fitting outer clothing   Partner to avoid the same products as well  Over the counter probiotic taken orally may help to restore vaginal ayan  I recommend wearing very supportive bra, sports bra's are the best, even at night when sleeping, may use warm compresses or ice, see which one helps with relief of pain and continue with that regimen  Use Coconut oil and massage breast   Tylenol and/or ibuprofen, may be used for pain relief, try OTC aspercreme which has aspirin in it  Chamomile tea or the extract,  Eliminate caffeine for a while, see if that helps  Evening primrose oil, and Vit E are other options to take orally, this doesn't help everyone but you can try  May need to see PCP if having neck or upper back pain which can radiate to breast pain  If breast pain continues after the attempts, I would recommend that she see Dr Campbell Service     There may be more options available, we can refer to Breast Specialist physician if the pain becomes disruptive to your daily activities  Evening of primrose oil: Take one 500 mg capsule daily for 1 week  If tolerated, increase to one 500 mg twice a day  If after 1 month you have not experienced any relief you may double the dose, taking two 500mg capsule in the morning and the evening  If you still continue with pain after trying this, you most likely will not benefit from use
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
## 2021-08-31 ENCOUNTER — OFFICE VISIT (OUTPATIENT)
Dept: OBGYN CLINIC | Facility: CLINIC | Age: 51
End: 2021-08-31
Payer: COMMERCIAL

## 2021-08-31 ENCOUNTER — TELEPHONE (OUTPATIENT)
Dept: PAIN MEDICINE | Facility: CLINIC | Age: 51
End: 2021-08-31

## 2021-08-31 VITALS
HEIGHT: 60 IN | BODY MASS INDEX: 31.61 KG/M2 | SYSTOLIC BLOOD PRESSURE: 110 MMHG | WEIGHT: 161 LBS | DIASTOLIC BLOOD PRESSURE: 80 MMHG

## 2021-08-31 DIAGNOSIS — Z12.31 ENCOUNTER FOR SCREENING MAMMOGRAM FOR BREAST CANCER: ICD-10-CM

## 2021-08-31 DIAGNOSIS — Z01.419 ENCOUNTER FOR ANNUAL ROUTINE GYNECOLOGICAL EXAMINATION: Primary | ICD-10-CM

## 2021-08-31 PROCEDURE — 99386 PREV VISIT NEW AGE 40-64: CPT | Performed by: OBSTETRICS & GYNECOLOGY

## 2021-08-31 PROCEDURE — G0145 SCR C/V CYTO,THINLAYER,RESCR: HCPCS | Performed by: OBSTETRICS & GYNECOLOGY

## 2021-08-31 PROCEDURE — 0503F POSTPARTUM CARE VISIT: CPT | Performed by: OBSTETRICS & GYNECOLOGY

## 2021-08-31 PROCEDURE — G0476 HPV COMBO ASSAY CA SCREEN: HCPCS | Performed by: OBSTETRICS & GYNECOLOGY

## 2021-08-31 NOTE — TELEPHONE ENCOUNTER
Juan Pablo from [employer] called on behalf of the patient and wanted to reschl patients procedure  [facility] # [phone]  And she stated you can talk to anyone that answers and they can get patient

## 2021-09-01 ENCOUNTER — TELEPHONE (OUTPATIENT)
Dept: INTERNAL MEDICINE CLINIC | Facility: CLINIC | Age: 51
End: 2021-09-01

## 2021-09-01 NOTE — TELEPHONE ENCOUNTER
S/w Tim Alejo @ [facility] and scheduled SIJ injection for 9/9/21 1115 am  Gave pre procedure instructions and masking//vaccine policy

## 2021-09-01 NOTE — TELEPHONE ENCOUNTER
----- Message from Tamela Mackay, 10 Jon[address] Lag[address]re sent at 9/1/2021 10:26 AM EDT -----    Please let the patient know that her Cologuard is negative for colon cancer

## 2021-09-02 LAB
HPV HR 12 DNA CVX QL NAA+PROBE: NEGATIVE
HPV16 DNA CVX QL NAA+PROBE: NEGATIVE
HPV18 DNA CVX QL NAA+PROBE: NEGATIVE

## 2021-09-07 ENCOUNTER — TELEPHONE (OUTPATIENT)
Dept: RADIOLOGY | Facility: CLINIC | Age: 51
End: 2021-09-07

## 2021-09-07 LAB
LAB AP GYN PRIMARY INTERPRETATION: NORMAL
Lab: NORMAL

## 2021-09-08 NOTE — TELEPHONE ENCOUNTER
Nino ( from ) group home called in to reschedule the pt procedure  Please be advised thank you    Pt can be reached @ [phone]

## 2021-09-13 ENCOUNTER — TELEPHONE (OUTPATIENT)
Dept: INTERNAL MEDICINE CLINIC | Facility: CLINIC | Age: 51
End: 2021-09-13

## 2021-09-13 DIAGNOSIS — R32 URINARY INCONTINENCE, UNSPECIFIED TYPE: Primary | ICD-10-CM

## 2021-09-13 RX ORDER — DIAPER,BRIEF,ADULT, DISPOSABLE
EACH MISCELLANEOUS 2 TIMES DAILY
Qty: 32 EACH | Refills: 1 | Status: SHIPPED | OUTPATIENT
Start: 2021-09-13 | End: 2021-10-20 | Stop reason: SDUPTHER

## 2021-09-14 ENCOUNTER — TELEPHONE (OUTPATIENT)
Dept: OBGYN CLINIC | Facility: CLINIC | Age: 51
End: 2021-09-14

## 2021-09-14 NOTE — TELEPHONE ENCOUNTER
----- Message from Omero Nava, 10 Jonnathan Laguerre sent at 9/10/2021  8:46 AM EDT -----  Please call with normals

## 2021-09-14 NOTE — TELEPHONE ENCOUNTER
Spoke with patient on 09/14/2021 at 8:22 am to inform her that her Pap smear and HPV were all negative

## 2021-09-20 NOTE — TELEPHONE ENCOUNTER
Previously requested by patient and records show she told urgent care that Tizanidine was not helping, so I did not reorder  We explained this to her:     Rossana Rubin RN       9/15/21 2:55 PM  Note     S/W pt and advised if she could not walk to go to ED, pt stated she could "hardly walk" and that she "already went to the [facility] " Started to tell her the rest of the message and she stated "That's what he said? Ok then, have a nice day " Then hung up the phone  Can try an alternative muscle relaxer if Tizanidine not helping

## 2021-09-20 NOTE — TELEPHONE ENCOUNTER
Pt calling back asking us to put a rush on the medication because she is in a lot of pain    Pt # [phone]

## 2021-09-20 NOTE — TELEPHONE ENCOUNTER
Pt contacted Call Center requested refill of their medication  Medication Name:  tiZANidine (ZANAFLEX      Dosage of Med:2 mg       Frequency of Med:Take 2 tablets (4 mg total) by mouth 2 (two) times a day as needed for muscle spasms      Remaining Medication: 0      Pharmacy and Location:  [facility] [address] [place] [address]  Yasmani Lizarraga [address] [facility] [address] [place]   Phone:  [phone]        Pt  Preferred Callback Phone Number: [phone]      Thank you

## 2021-09-20 NOTE — TELEPHONE ENCOUNTER
S/w pt who states tizanidine helped a little but did not work that well  Pt agreeable to alternative  Pt had previously been on methocarbamol which also did not help

## 2021-09-21 NOTE — PLAN OF CARE
Last seen:9/1/21  Last refilled:3/1/21  F/u appointment:3/02/22     Problem: Alteration in Thoughts and Perception  Goal: Treatment Goal: Gain control of psychotic behaviors/thinking, reduce/eliminate presenting symptoms and demonstrate improved reality functioning upon discharge  Outcome: Progressing  Goal: Verbalize thoughts and feelings  Description: Interventions:  - Promote a nonjudgmental and trusting relationship with the patient through active listening and therapeutic communication  - Assess patient's level of functioning, behavior and potential for risk  - Engage patient in 1 on 1 interactions  - Encourage patient to express fears, feelings, frustrations, and discuss symptoms    - [place] patient to reality, help patient recognize reality-based thinking   - Administer medications as ordered and assess for potential side effects  - Provide the patient education related to the signs and symptoms of the illness and desired effects of prescribed medications  Outcome: Progressing  Goal: Refrain from acting on delusional thinking/internal stimuli  Description: Interventions:  - Monitor patient closely, per order   - Utilize least restrictive measures   - Set reasonable limits, give positive feedback for acceptable   - Administer medications as ordered and monitor of potential side effects  Outcome: Progressing  Goal: Agree to be compliant with medication regime, as prescribed and report medication side effects  Description: Interventions:  - Offer appropriate PRN medication and supervise ingestion; conduct AIMS, as needed   Outcome: Progressing  Goal: Attend and participate in unit activities, including therapeutic, recreational, and educational groups  Description: Interventions:  -Encourage Visitation and family involvement in care  Outcome: Progressing  Goal: Recognize dysfunctional thoughts, communicate reality-based thoughts at the time of discharge  Description: Interventions:  - Provide medication and psycho-education to assist patient in compliance and developing insight into his/her illness   Outcome: Progressing  Goal: Complete daily ADLs, including personal hygiene independently, as able  Description: Interventions:  - Observe, teach, and assist patient with ADLS  - Monitor and promote a balance of rest/activity, with adequate nutrition and elimination   Outcome: Progressing     Problem: Ineffective Coping  Goal: Cooperates with admission process  Description: Interventions:   - Complete admission process  Outcome: Progressing  Goal: Identifies ineffective coping skills  Outcome: Progressing  Goal: Identifies healthy coping skills  Outcome: Progressing  Goal: Demonstrates healthy coping skills  Outcome: Progressing  Goal: Participates in unit activities  Description: Interventions:  - Provide therapeutic environment   - Provide required programming   - Redirect inappropriate behaviors   Outcome: Progressing  Goal: Patient/Family participate in treatment and DC plans  Description: Interventions:  - Provide therapeutic environment  Outcome: Progressing  Goal: Patient/Family verbalizes awareness of resources  Outcome: Progressing  Goal: Understands least restrictive measures  Description: Interventions:  - Utilize least restrictive behavior  Outcome: Progressing  Goal: Free from restraint events  Description: - Utilize least restrictive measures   - Provide behavioral interventions   - Redirect inappropriate behaviors   Outcome: Progressing     Problem: Risk for Self Injury/Neglect  Goal: Treatment Goal: Remain safe during length of stay, learn and adopt new coping skills, and be free of self-injurious ideation, impulses and acts at the time of discharge  Outcome: Progressing  Goal: Verbalize thoughts and feelings  Description: Interventions:  - Assess and re-assess patient's lethality and potential for self-injury  - Engage patient in 1:1 interactions, daily, for a minimum of 15 minutes  - Encourage patient to express feelings, fears, frustrations, hopes  - Establish rapport/trust with patient   Outcome: Progressing  Goal: Refrain from harming self  Description: Interventions:  - Monitor patient closely, per order  - Develop a trusting relationship  - Supervise medication ingestion, monitor effects and side effects   Outcome: Progressing  Goal: Attend and participate in unit activities, including therapeutic, recreational, and educational groups  Description: Interventions:  - Provide therapeutic and educational activities daily, encourage attendance and participation, and document same in the medical record  - Obtain collateral information, encourage visitation and family involvement in care   Outcome: Progressing  Goal: Recognize maladaptive responses and adopt new coping mechanisms  Outcome: Progressing  Goal: Complete daily ADLs, including personal hygiene independently, as able  Description: Interventions:  - Observe, teach, and assist patient with ADLS  - Monitor and promote a balance of rest/activity, with adequate nutrition and elimination  Outcome: Progressing     Problem: Depression  Goal: Treatment Goal: Demonstrate behavioral control of depressive symptoms, verbalize feelings of improved mood/affect, and adopt new coping skills prior to discharge  Outcome: Progressing  Goal: Verbalize thoughts and feelings  Description: Interventions:  - Assess and re-assess patient's level of risk   - Engage patient in 1:1 interactions, daily, for a minimum of 15 minutes   - Encourage patient to express feelings, fears, frustrations, hopes   Outcome: Progressing  Goal: Refrain from harming self  Description: Interventions:  - Monitor patient closely, per order   - Supervise medication ingestion, monitor effects and side effects   Outcome: Progressing  Goal: Refrain from isolation  Description: Interventions:  - Develop a trusting relationship   - Encourage socialization   Outcome: Progressing  Goal: Refrain from self-neglect  Outcome: Progressing  Goal: Attend and participate in unit activities, including therapeutic, recreational, and educational groups  Description: Interventions:  - Provide therapeutic and educational activities daily, encourage attendance and participation, and document same in the medical record   Outcome: Progressing  Goal: Complete daily ADLs, including personal hygiene independently, as able  Description: Interventions:  - Observe, teach, and assist patient with ADLS  -  Monitor and promote a balance of rest/activity, with adequate nutrition and elimination   Outcome: Progressing     Problem: Anxiety  Goal: Anxiety is at manageable level  Description: Interventions:  - Assess and monitor patient's anxiety level  - Monitor for signs and symptoms (heart palpitations, chest pain, shortness of breath, headaches, nausea, feeling jumpy, restlessness, irritable, apprehensive)  - Collaborate with interdisciplinary team and initiate plan and interventions as ordered    - [place] patient to unit/surroundings  - Explain treatment plan  - Encourage participation in care  - Encourage verbalization of concerns/fears  - Identify coping mechanisms  - Assist in developing anxiety-reducing skills  - Administer/offer alternative therapies  - Limit or eliminate stimulants  Outcome: Progressing     Problem: Risk for Violence/Aggression Toward Others  Goal: Treatment Goal: Refrain from acts of violence/aggression during length of stay, and demonstrate improved impulse control at the time of discharge  Outcome: Progressing  Goal: Verbalize thoughts and feelings  Description: Interventions:  - Assess and re-assess patient's level of risk, every waking shift  - Engage patient in 1:1 interactions, daily, for a minimum of 15 minutes   - Allow patient to express feelings and frustrations in a safe and non-threatening manner   - Establish rapport/trust with patient   Outcome: Progressing  Goal: Refrain from harming others  Outcome: Progressing  Goal: Refrain from destructive acts on the environment or property  Outcome: Progressing  Goal: Control angry outbursts  Description: Interventions:  - Monitor patient closely, per order  - Ensure early verbal de-escalation  - Monitor prn medication needs  - Set reasonable/therapeutic limits, outline behavioral expectations, and consequences   - Provide a non-threatening milieu, utilizing the least restrictive interventions   Outcome: Progressing  Goal: Attend and participate in unit activities, including therapeutic, recreational, and educational groups  Description: Interventions:  - Provide therapeutic and educational activities daily, encourage attendance and participation, and document same in the medical record   Outcome: Progressing  Goal: Identify appropriate positive anger management techniques  Description: Interventions:  - Offer anger management and coping skills groups   - Staff will provide positive feedback for appropriate anger control  Outcome: Progressing     Problem: Alteration in Orientation  Goal: Treatment Goal: Demonstrate a reduction of confusion and improved orientation to person, place, time and/or situation upon discharge, according to optimum baseline/ability  Outcome: Progressing  Goal: Interact with staff daily  Description: Interventions:  - Assess and re-assess patient's level of orientation  - Engage patient in 1 on 1 interactions, daily, for a minimum of 15 minutes   - Establish rapport/trust with patient   Outcome: Progressing  Goal: Express concerns related to confused thinking related to:  Description: Interventions:  - Encourage patient to express feelings, fears, frustrations, hopes  - Assign consistent caregivers   - [place]/re-orient patient as needed  - Allow comfort items, as appropriate  - Provide visual cues, signs, etc    Outcome: Progressing  Goal: Allow medical examinations, as recommended  Description: Interventions:  - Provide physical/neurological exams and/or referrals, per provider   Outcome: Progressing  Goal: Cooperate with recommended testing/procedures  Description: Interventions:  - Determine need for ancillary testing  - Observe for mental status changes  - Implement falls/precaution protocol   Outcome: Progressing  Goal: Attend and participate in unit activities, including therapeutic, recreational, and educational groups  Description: Interventions:  - Provide therapeutic and educational activities daily, encourage attendance and participation, and document same in the medical record   - Provide appropriate opportunities for reminiscence   - Provide a consistent daily routine   - Encourage family contact/visitation   Outcome: Progressing  Goal: Complete daily ADLs, including personal hygiene independently, as able  Description: Interventions:  - Observe, teach, and assist patient with ADLS  Outcome: Progressing     Problem: Individualized Interventions  Goal: Patient will verbalize appropriate use of telephone within 5 days  Description: Interventions:  - Treatment team to determine use of supervised phone privileges   Outcome: Progressing  Goal: Patient will verbalize need for hospitalization and will no longer attempt elopement within 5 days  Description: Interventions:  - Ongoing education to help patient understand need for hospitalization  Outcome: Progressing  Goal: Patient will recognize inappropriate behaviors and develop alternative behaviors within 5 days  Description: Interventions:  - Patient in collaboration with Treatment Team will develop a behavior management plan to help identify effective coping skills to deal with stressors  Outcome: Progressing

## 2021-09-21 NOTE — TELEPHONE ENCOUNTER
Skelaxin 800 mg was sent to the wrong pharmacy   Please resend it to:    [facility]

## 2021-09-22 NOTE — TELEPHONE ENCOUNTER
Discontinued skelaxin, resent tizanidine  It is a 4mg tablet (last was given 2mg tabs) that she can take TID PRN

## 2021-09-22 NOTE — TELEPHONE ENCOUNTER
Pharmacist calling to make us aware Level 1 High Severity SSRI- MAOI High reaction with SERTRALINE and Skelaxin 800 mg    Please advise if you would like to make a switch in medication

## 2021-09-23 ENCOUNTER — HOSPITAL ENCOUNTER (OUTPATIENT)
Dept: RADIOLOGY | Facility: CLINIC | Age: 51
Discharge: HOME | End: 2021-09-23
Attending: STUDENT IN AN ORGANIZED HEALTH CARE EDUCATION/TRAINING PROGRAM | Admitting: STUDENT IN AN ORGANIZED HEALTH CARE EDUCATION/TRAINING PROGRAM
Payer: COMMERCIAL

## 2021-09-23 VITALS
TEMPERATURE: 97.2 F | SYSTOLIC BLOOD PRESSURE: 124 MMHG | OXYGEN SATURATION: 97 % | RESPIRATION RATE: 18 BRPM | HEART RATE: 86 BPM | DIASTOLIC BLOOD PRESSURE: 76 MMHG

## 2021-09-23 DIAGNOSIS — M53.3 SACROILIAC PAIN: ICD-10-CM

## 2021-09-23 RX ORDER — BUPIVACAINE HCL/PF 2.5 MG/ML
3 VIAL (ML) INJECTION ONCE
Status: COMPLETED | OUTPATIENT
Start: 2021-09-23 | End: 2021-09-23

## 2021-09-23 RX ORDER — METHYLPREDNISOLONE ACETATE 80 MG/ML
80 INJECTION, SUSPENSION INTRA-ARTICULAR; INTRALESIONAL; INTRAMUSCULAR; PARENTERAL; SOFT TISSUE ONCE
Status: COMPLETED | OUTPATIENT
Start: 2021-09-23 | End: 2021-09-23

## 2021-09-23 RX ADMIN — IOHEXOL 1 ML: 300 INJECTION, SOLUTION INTRAVENOUS at 09:54

## 2021-09-23 RX ADMIN — METHYLPREDNISOLONE ACETATE 80 MG: 80 INJECTION, SUSPENSION INTRA-ARTICULAR; INTRALESIONAL; INTRAMUSCULAR; PARENTERAL; SOFT TISSUE at 09:55

## 2021-09-23 RX ADMIN — Medication 3 ML: at 09:55

## 2021-09-23 NOTE — DISCHARGE INSTR - LAB
Steroid Joint Injection   WHAT YOU NEED TO KNOW:   A steroid joint injection is a procedure to inject steroid medicine into a joint  Steroid medicine decreases pain and inflammation  The injection may also contain an anesthetic (numbing medicine) to decrease pain  It may be done to treat conditions such as arthritis, gout, or carpal tunnel syndrome  The injections may be given in your knee, ankle, shoulder, elbow, wrist, ankle or sacroiliac joint  1  Do not apply heat to any area that is numb  If you have discomfort or soreness at the injection site, you may apply ice today, 20 minutes on and 20 minutes off  Tomorrow you may use ice or warm, moist heat  Do not apply ice or heat directly to the skin  2  You may have an increase or change in the discomfort for 36-48 hours after your treatment  Apply ice and continue with any pain medicine you have been prescribed  3  Do not do anything strenuous today  You may shower, but no tub baths or hot tubs today  You may resume your normal activities tomorrow, but do not overdo it  Resume normal activities slowly when you are feeling better  4  If you experience redness, drainage or swelling at the injection site, or if you develop a fever above 100 degrees, please call [facility] at [phone] or go to the Emergency Room  5  Continue to take all routine medicines prescribed by your primary care physician unless otherwise instructed by our staff  Most blood thinners should be started again according to your regularly scheduled dosing  If you have any questions, please give our office a call  As no general anesthesia was used in today's procedure, you should not experience any side effects related to anesthesia  If you have a problem specifically related to your procedure, please call our office at [phone]  Problems not related to your procedure should be directed to your primary care physician
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
## 2021-09-30 ENCOUNTER — TELEPHONE (OUTPATIENT)
Dept: PAIN MEDICINE | Facility: CLINIC | Age: 51
End: 2021-09-30

## 2021-09-30 DIAGNOSIS — M54.9 BACKACHE: ICD-10-CM

## 2021-09-30 RX ORDER — LIDOCAINE 50 MG/G
PATCH TOPICAL
Qty: 30 PATCH | Refills: 4 | Status: SHIPPED | OUTPATIENT
Start: 2021-09-30 | End: 2021-10-01 | Stop reason: SDUPTHER

## 2021-09-30 NOTE — TELEPHONE ENCOUNTER
Left side pain level is a 4/10 pretty good.  The right side where she did not get injected pain level is a 8/10.  Please call pt back    Pt # [phone]

## 2021-09-30 NOTE — TELEPHONE ENCOUNTER
She didn't have right side done. She was always complaining of pain in the left. Is the pain in same area but on the right? Could consider injeciton for right

## 2021-09-30 NOTE — TELEPHONE ENCOUNTER
Her pain level is a 7-9/10 depending on what she is doing. The medication doesn't help on the right side. Please follow up

## 2021-09-30 NOTE — TELEPHONE ENCOUNTER
S/W pt who states her right sided pain just started a couple of days ago out of the blue.  No precipitating event.  States pain is in right LB, but higher up than left, and radiating towards hip.  Please advised OV or injection

## 2021-09-30 NOTE — TELEPHONE ENCOUNTER
Patient states 60-70% of improvement & pain level is 6/10 with medication- 10/10 without medication- thank you

## 2021-10-01 DIAGNOSIS — M54.9 BACKACHE: ICD-10-CM

## 2021-10-01 RX ORDER — LIDOCAINE 50 MG/G
1 PATCH TOPICAL DAILY
Qty: 30 PATCH | Refills: 0 | Status: SHIPPED | OUTPATIENT
Start: 2021-10-01 | End: 2022-03-28 | Stop reason: ALTCHOICE

## 2021-10-01 NOTE — TELEPHONE ENCOUNTER
Pt is aware that Dr. Gandhi would like her to come in for an OV. She said that she will call back to schedule, thank you

## 2021-10-05 NOTE — TELEPHONE ENCOUNTER
Pt called in stating that the tiZANidine (ZANAFLEX) 4 mg tablet doesn't help with her pain & just makes her feel sleepy & changes her mood. Pt didn't want to make an OV at this time       [phone]

## 2021-10-07 ENCOUNTER — TELEPHONE (OUTPATIENT)
Dept: PAIN MEDICINE | Facility: CLINIC | Age: 51
End: 2021-10-07

## 2021-10-11 NOTE — TELEPHONE ENCOUNTER
S/w pt, pt is having right side low back pain and per below task need ov. Scheduled ov for 10/13/21 815 am.

## 2021-10-11 NOTE — TELEPHONE ENCOUNTER
Lisandra from [facility] is requesting to schedule patient's next injection. Please advise, [facility]    Call back# [phone]

## 2021-10-13 ENCOUNTER — TELEPHONE (OUTPATIENT)
Dept: PAIN MEDICINE | Facility: CLINIC | Age: 51
End: 2021-10-13

## 2021-10-13 ENCOUNTER — OFFICE VISIT (OUTPATIENT)
Dept: PAIN MEDICINE | Facility: CLINIC | Age: 51
End: 2021-10-13
Payer: COMMERCIAL

## 2021-10-13 VITALS
HEART RATE: 83 BPM | SYSTOLIC BLOOD PRESSURE: 111 MMHG | DIASTOLIC BLOOD PRESSURE: 78 MMHG | BODY MASS INDEX: 31.22 KG/M2 | HEIGHT: 60 IN | WEIGHT: 159 LBS

## 2021-10-13 DIAGNOSIS — M53.3 SACROILIAC PAIN: Primary | ICD-10-CM

## 2021-10-13 DIAGNOSIS — M54.50 LUMBAR BACK PAIN: ICD-10-CM

## 2021-10-13 PROCEDURE — 99213 OFFICE O/P EST LOW 20 MIN: CPT | Performed by: STUDENT IN AN ORGANIZED HEALTH CARE EDUCATION/TRAINING PROGRAM

## 2021-10-18 ENCOUNTER — TELEPHONE (OUTPATIENT)
Dept: INTERNAL MEDICINE CLINIC | Facility: CLINIC | Age: 51
End: 2021-10-18

## 2021-10-19 ENCOUNTER — TELEPHONE (OUTPATIENT)
Dept: OTHER | Facility: OTHER | Age: 51
End: 2021-10-19

## 2021-10-20 DIAGNOSIS — R32 URINARY INCONTINENCE, UNSPECIFIED TYPE: ICD-10-CM

## 2021-10-20 RX ORDER — DIAPER,BRIEF,ADULT, DISPOSABLE
EACH MISCELLANEOUS 2 TIMES DAILY
Qty: 60 EACH | Refills: 2 | Status: SHIPPED | OUTPATIENT
Start: 2021-10-20 | End: 2021-12-13 | Stop reason: ALTCHOICE

## 2021-10-21 ENCOUNTER — HOSPITAL ENCOUNTER (OUTPATIENT)
Dept: RADIOLOGY | Facility: CLINIC | Age: 51
Discharge: HOME | End: 2021-10-21
Attending: STUDENT IN AN ORGANIZED HEALTH CARE EDUCATION/TRAINING PROGRAM | Admitting: STUDENT IN AN ORGANIZED HEALTH CARE EDUCATION/TRAINING PROGRAM
Payer: COMMERCIAL

## 2021-10-21 ENCOUNTER — TELEPHONE (OUTPATIENT)
Dept: INTERNAL MEDICINE CLINIC | Facility: CLINIC | Age: 51
End: 2021-10-21

## 2021-10-21 VITALS
TEMPERATURE: 96.7 F | RESPIRATION RATE: 20 BRPM | SYSTOLIC BLOOD PRESSURE: 119 MMHG | HEART RATE: 96 BPM | OXYGEN SATURATION: 96 % | DIASTOLIC BLOOD PRESSURE: 81 MMHG

## 2021-10-21 DIAGNOSIS — M53.3 SACROILIAC PAIN: ICD-10-CM

## 2021-10-21 PROCEDURE — 27096 INJECT SACROILIAC JOINT: CPT | Performed by: STUDENT IN AN ORGANIZED HEALTH CARE EDUCATION/TRAINING PROGRAM

## 2021-10-21 RX ORDER — METHYLPREDNISOLONE ACETATE 40 MG/ML
40 INJECTION, SUSPENSION INTRA-ARTICULAR; INTRALESIONAL; INTRAMUSCULAR; PARENTERAL; SOFT TISSUE ONCE
Status: COMPLETED | OUTPATIENT
Start: 2021-10-21 | End: 2021-10-21

## 2021-10-21 RX ORDER — BUPIVACAINE HCL/PF 2.5 MG/ML
1 VIAL (ML) INJECTION ONCE
Status: COMPLETED | OUTPATIENT
Start: 2021-10-21 | End: 2021-10-21

## 2021-10-21 RX ADMIN — IOHEXOL 1 ML: 300 INJECTION, SOLUTION INTRAVENOUS at 13:13

## 2021-10-21 RX ADMIN — Medication 1 ML: at 13:13

## 2021-10-21 RX ADMIN — METHYLPREDNISOLONE ACETATE 40 MG: 40 INJECTION, SUSPENSION INTRA-ARTICULAR; INTRALESIONAL; INTRAMUSCULAR; PARENTERAL; SOFT TISSUE at 13:13

## 2021-10-21 NOTE — TELEPHONE ENCOUNTER
Patient is asking Jadon Romero to change the psyllium (METAMUCIL) packet to [employer]      Send the new script to [facility]

## 2021-10-22 DIAGNOSIS — K59.01 SLOW TRANSIT CONSTIPATION: Primary | ICD-10-CM

## 2021-10-22 RX ORDER — POLYETHYLENE GLYCOL 3350 17 G/17G
17 POWDER, FOR SOLUTION ORAL DAILY
Qty: 100 EACH | Refills: 1 | Status: SHIPPED | OUTPATIENT
Start: 2021-10-22 | End: 2021-10-27 | Stop reason: SDUPTHER

## 2021-10-25 ENCOUNTER — TELEPHONE (OUTPATIENT)
Dept: INTERNAL MEDICINE CLINIC | Facility: CLINIC | Age: 51
End: 2021-10-25

## 2021-10-25 DIAGNOSIS — B37.9 YEAST INFECTION: Primary | ICD-10-CM

## 2021-10-25 RX ORDER — FLUCONAZOLE 150 MG/1
150 TABLET ORAL ONCE
Qty: 1 TABLET | Refills: 0 | Status: SHIPPED | OUTPATIENT
Start: 2021-10-25 | End: 2021-10-25

## 2021-10-27 DIAGNOSIS — M79.671 RIGHT FOOT PAIN: ICD-10-CM

## 2021-10-27 DIAGNOSIS — K59.01 SLOW TRANSIT CONSTIPATION: ICD-10-CM

## 2021-10-27 RX ORDER — POLYETHYLENE GLYCOL 3350 17 G/17G
17 POWDER, FOR SOLUTION ORAL DAILY
Qty: 100 EACH | Refills: 1 | Status: SHIPPED | OUTPATIENT
Start: 2021-10-27

## 2021-10-28 ENCOUNTER — TELEPHONE (OUTPATIENT)
Dept: PAIN MEDICINE | Facility: CLINIC | Age: 51
End: 2021-10-28

## 2021-11-23 ENCOUNTER — TELEPHONE (OUTPATIENT)
Dept: INTERNAL MEDICINE CLINIC | Facility: CLINIC | Age: 51
End: 2021-11-23

## 2021-11-23 NOTE — TELEPHONE ENCOUNTER
This is handled by Sly     Needs prior auth through insurance to cover pull ups    P# to call is: [phone]    Did Ebony Burciaga put in an order for them?

## 2021-11-23 NOTE — TELEPHONE ENCOUNTER
Called the patient to verify the number she left for the PA because when I dial the number it states it is not a working number  Patient provided an alternative number but I was on hold for quite sometime, so I was able to complete the PA for the patient via covermymeds and I faxed some paperwork to her provider   Patients Key via covermymeds is: [id number]

## 2021-11-24 ENCOUNTER — OFFICE VISIT (OUTPATIENT)
Dept: INTERNAL MEDICINE CLINIC | Facility: CLINIC | Age: 51
End: 2021-11-24
Payer: COMMERCIAL

## 2021-11-24 VITALS
BODY MASS INDEX: 30.12 KG/M2 | HEART RATE: 88 BPM | WEIGHT: 154.2 LBS | RESPIRATION RATE: 16 BRPM | DIASTOLIC BLOOD PRESSURE: 72 MMHG | SYSTOLIC BLOOD PRESSURE: 118 MMHG

## 2021-11-24 DIAGNOSIS — K64.9 HEMORRHOIDS, UNSPECIFIED HEMORRHOID TYPE: Primary | ICD-10-CM

## 2021-11-24 PROCEDURE — 99212 OFFICE O/P EST SF 10 MIN: CPT

## 2021-11-26 PROBLEM — K64.9 HEMORRHOIDS: Status: ACTIVE | Noted: 2021-11-26

## 2021-12-10 ENCOUNTER — APPOINTMENT (OUTPATIENT)
Dept: LAB | Facility: CLINIC | Age: 51
End: 2021-12-10
Payer: COMMERCIAL

## 2021-12-11 ENCOUNTER — TELEPHONE (OUTPATIENT)
Dept: INTERNAL MEDICINE CLINIC | Facility: CLINIC | Age: 51
End: 2021-12-11

## 2021-12-11 NOTE — TELEPHONE ENCOUNTER
Patient would like to know if [facility] was notified of approval for the Depend underwear  She has not received a delivery and needs to know if insurance approved order and if it was sent to [facility]

## 2021-12-12 NOTE — PROGRESS NOTES
05/03/21 0700   Activity/Group Checklist   Group   (Coffee Talk )   Attendance Attended   Attendance Duration (min) 46-60   Interactions Interacted appropriately   Affect/Mood Appropriate;Bright;Calm;Normal range   Goals Achieved Identified feelings; Able to listen to others; Able to engage in interactions; Able to self-disclose NOTIFICATION RETURN TO WORK / SCHOOL    12/12/2021 2:33 PM    Mr. Boubacar Hairston  [address] [place] [id number]      To Whom It May Concern:    Boubacar Hairston is currently under the care of [address]. He will return to work/school when his COVID test returns, if negative. Likely by 12/15/21. If there are questions or concerns please have the patient contact our office.         Sincerely,      E PROVIDER

## 2021-12-13 ENCOUNTER — TELEPHONE (OUTPATIENT)
Dept: INTERNAL MEDICINE CLINIC | Facility: CLINIC | Age: 51
End: 2021-12-13

## 2021-12-13 DIAGNOSIS — R32 URINARY INCONTINENCE, UNSPECIFIED TYPE: Primary | ICD-10-CM

## 2021-12-13 RX ORDER — DIAPER,BRIEF,ADULT, DISPOSABLE
EACH MISCELLANEOUS 3 TIMES DAILY PRN
Qty: 96 EACH | Refills: 2 | Status: SHIPPED | OUTPATIENT
Start: 2021-12-13 | End: 2021-12-16 | Stop reason: SDUPTHER

## 2021-12-13 NOTE — TELEPHONE ENCOUNTER
that was authorization for insurance not the form from [employer]     I also have betsy looking for form from [employer]

## 2021-12-16 ENCOUNTER — TELEPHONE (OUTPATIENT)
Dept: INTERNAL MEDICINE CLINIC | Facility: CLINIC | Age: 51
End: 2021-12-16

## 2021-12-16 DIAGNOSIS — R32 URINARY INCONTINENCE, UNSPECIFIED TYPE: ICD-10-CM

## 2021-12-16 RX ORDER — DIAPER,BRIEF,ADULT, DISPOSABLE
EACH MISCELLANEOUS 3 TIMES DAILY PRN
Qty: 96 EACH | Refills: 2 | Status: SHIPPED | OUTPATIENT
Start: 2021-12-16 | End: 2022-07-22

## 2021-12-18 DIAGNOSIS — M25.541 JOINT PAIN IN BOTH HANDS: ICD-10-CM

## 2021-12-18 DIAGNOSIS — M25.542 JOINT PAIN IN BOTH HANDS: ICD-10-CM

## 2021-12-20 ENCOUNTER — APPOINTMENT (OUTPATIENT)
Dept: LAB | Facility: CLINIC | Age: 51
End: 2021-12-20
Payer: COMMERCIAL

## 2021-12-20 DIAGNOSIS — Z79.899 ENCOUNTER FOR LONG-TERM (CURRENT) USE OF OTHER MEDICATIONS: ICD-10-CM

## 2021-12-20 LAB
BASOPHILS # BLD AUTO: 0.07 THOUSANDS/ΜL (ref 0–0.1)
BASOPHILS NFR BLD AUTO: 1 % (ref 0–1)
EOSINOPHIL # BLD AUTO: 0.19 THOUSAND/ΜL (ref 0–0.61)
EOSINOPHIL NFR BLD AUTO: 1 % (ref 0–6)
ERYTHROCYTE [DISTWIDTH] IN BLOOD BY AUTOMATED COUNT: 16.2 % (ref 11.6–15.1)
HCT VFR BLD AUTO: 44.1 % (ref 34.8–46.1)
HGB BLD-MCNC: 13.7 G/DL (ref 11.5–15.4)
IMM GRANULOCYTES # BLD AUTO: 0.08 THOUSAND/UL (ref 0–0.2)
IMM GRANULOCYTES NFR BLD AUTO: 1 % (ref 0–2)
LYMPHOCYTES # BLD AUTO: 2.46 THOUSANDS/ΜL (ref 0.6–4.47)
LYMPHOCYTES NFR BLD AUTO: 16 % (ref 14–44)
MCH RBC QN AUTO: 27.8 PG (ref 26.8–34.3)
MCHC RBC AUTO-ENTMCNC: 31.1 G/DL (ref 31.4–37.4)
MCV RBC AUTO: 90 FL (ref 82–98)
MONOCYTES # BLD AUTO: 0.71 THOUSAND/ΜL (ref 0.17–1.22)
MONOCYTES NFR BLD AUTO: 5 % (ref 4–12)
NEUTROPHILS # BLD AUTO: 11.45 THOUSANDS/ΜL (ref 1.85–7.62)
NEUTS SEG NFR BLD AUTO: 76 % (ref 43–75)
NRBC BLD AUTO-RTO: 0 /100 WBCS
PLATELET # BLD AUTO: 339 THOUSANDS/UL (ref 149–390)
PMV BLD AUTO: 10.1 FL (ref 8.9–12.7)
RBC # BLD AUTO: 4.93 MILLION/UL (ref 3.81–5.12)
WBC # BLD AUTO: 14.96 THOUSAND/UL (ref 4.31–10.16)

## 2021-12-20 PROCEDURE — 85025 COMPLETE CBC W/AUTO DIFF WBC: CPT

## 2021-12-20 PROCEDURE — 36415 COLL VENOUS BLD VENIPUNCTURE: CPT

## 2021-12-20 RX ORDER — GLUCOSAM/CHON-MSM1/C/MANG/BOSW 750-644 MG
TABLET ORAL
Qty: 56 TABLET | Refills: 4 | Status: SHIPPED | OUTPATIENT
Start: 2021-12-20 | End: 2021-12-24

## 2021-12-23 ENCOUNTER — TELEPHONE (OUTPATIENT)
Dept: INTERNAL MEDICINE CLINIC | Facility: CLINIC | Age: 51
End: 2021-12-23

## 2021-12-23 DIAGNOSIS — Z11.52 ENCOUNTER FOR SCREENING FOR COVID-19: Primary | ICD-10-CM

## 2021-12-24 DIAGNOSIS — M25.542 JOINT PAIN IN BOTH HANDS: ICD-10-CM

## 2021-12-24 DIAGNOSIS — M25.541 JOINT PAIN IN BOTH HANDS: ICD-10-CM

## 2021-12-24 RX ORDER — GLUCOSAM/CHON-MSM1/C/MANG/BOSW 750-644 MG
TABLET ORAL
Qty: 56 TABLET | Refills: 4 | Status: SHIPPED | OUTPATIENT
Start: 2021-12-24 | End: 2022-06-07

## 2021-12-27 ENCOUNTER — TELEPHONE (OUTPATIENT)
Dept: INTERNAL MEDICINE CLINIC | Facility: CLINIC | Age: 51
End: 2021-12-27

## 2021-12-27 ENCOUNTER — TELEMEDICINE (OUTPATIENT)
Dept: INTERNAL MEDICINE CLINIC | Facility: CLINIC | Age: 51
End: 2021-12-27
Payer: COMMERCIAL

## 2021-12-27 DIAGNOSIS — J06.9 URI WITH COUGH AND CONGESTION: ICD-10-CM

## 2021-12-27 DIAGNOSIS — R05.9 COUGH: ICD-10-CM

## 2021-12-27 DIAGNOSIS — B34.9 VIRAL INFECTION, UNSPECIFIED: Primary | ICD-10-CM

## 2021-12-27 DIAGNOSIS — Z82.0 FAMILY HISTORY OF ALZHEIMER'S DISEASE: ICD-10-CM

## 2021-12-27 PROCEDURE — G2012 BRIEF CHECK IN BY MD/QHP: HCPCS | Performed by: NURSE PRACTITIONER

## 2021-12-27 RX ORDER — BENZONATATE 100 MG/1
100 CAPSULE ORAL 2 TIMES DAILY PRN
Qty: 30 CAPSULE | Refills: 0 | Status: SHIPPED | OUTPATIENT
Start: 2021-12-27

## 2021-12-27 RX ORDER — AZITHROMYCIN 250 MG/1
TABLET, FILM COATED ORAL
Qty: 6 TABLET | Refills: 0 | Status: SHIPPED | OUTPATIENT
Start: 2021-12-27 | End: 2022-01-01

## 2021-12-28 ENCOUNTER — APPOINTMENT (OUTPATIENT)
Dept: LAB | Facility: CLINIC | Age: 51
End: 2021-12-28
Payer: COMMERCIAL

## 2022-01-03 DIAGNOSIS — M54.42 CHRONIC BILATERAL LOW BACK PAIN WITH BILATERAL SCIATICA: ICD-10-CM

## 2022-01-03 DIAGNOSIS — M54.41 CHRONIC BILATERAL LOW BACK PAIN WITH BILATERAL SCIATICA: ICD-10-CM

## 2022-01-03 DIAGNOSIS — G89.29 CHRONIC BILATERAL LOW BACK PAIN WITH BILATERAL SCIATICA: ICD-10-CM

## 2022-01-04 ENCOUNTER — APPOINTMENT (OUTPATIENT)
Dept: LAB | Facility: CLINIC | Age: 52
End: 2022-01-04
Payer: COMMERCIAL

## 2022-01-05 ENCOUNTER — TELEPHONE (OUTPATIENT)
Dept: INTERNAL MEDICINE CLINIC | Facility: CLINIC | Age: 52
End: 2022-01-05

## 2022-01-05 NOTE — TELEPHONE ENCOUNTER
12/27-DID A VIRTUAL VISIT    SHE NOW THINKS SHE HAS A YEAST INFECTION IN HER THROAT, SHE WAS DRINKING FROM AN OLD STRAW THAT HAD FUNGUS IN IT  THROAT IS VERY DRY & SCRATCHY, COUGHING UP GREEN PHLEGM STILL     # [phone]

## 2022-01-10 ENCOUNTER — APPOINTMENT (OUTPATIENT)
Dept: LAB | Facility: CLINIC | Age: 52
End: 2022-01-10
Payer: COMMERCIAL

## 2022-01-14 NOTE — TELEPHONE ENCOUNTER
Will call pt  This medication was discontinued at last ov due to patient reporting it was ineffective

## 2022-01-14 NOTE — TELEPHONE ENCOUNTER
S/w pt  States she has been taking tizandine when needed and it has been effective for pain    Pt would like refill to pharmacy on file    Pt states she is on quarantine currently and will cb to schedule appt when she is doing better  No need to cb when script sent

## 2022-01-14 NOTE — TELEPHONE ENCOUNTER
Pt contacted Call Center requested refill of their medication          Medication Name: Tizanidine       Dosage of Med: ?      Frequency of Med: as needed       Remaining Medication: 0      Pharmacy and Location:  [address] [place] [address]

## 2022-01-17 RX ORDER — TIZANIDINE 4 MG/1
TABLET ORAL
Qty: 60 TABLET | Refills: 4 | Status: SHIPPED | OUTPATIENT
Start: 2022-01-17

## 2022-01-18 ENCOUNTER — APPOINTMENT (OUTPATIENT)
Dept: LAB | Facility: CLINIC | Age: 52
End: 2022-01-18
Payer: COMMERCIAL

## 2022-01-18 DIAGNOSIS — J45.21 MILD INTERMITTENT ASTHMATIC BRONCHITIS WITH ACUTE EXACERBATION: ICD-10-CM

## 2022-01-19 ENCOUNTER — TELEPHONE (OUTPATIENT)
Dept: INTERNAL MEDICINE CLINIC | Facility: CLINIC | Age: 52
End: 2022-01-19

## 2022-01-19 ENCOUNTER — HOSPITAL ENCOUNTER (OUTPATIENT)
Dept: MAMMOGRAPHY | Facility: CLINIC | Age: 52
Discharge: HOME | End: 2022-01-19
Payer: COMMERCIAL

## 2022-01-19 VITALS — HEIGHT: 60 IN | WEIGHT: 154 LBS | BODY MASS INDEX: 30.23 KG/M2

## 2022-01-19 DIAGNOSIS — Z12.31 ENCOUNTER FOR SCREENING MAMMOGRAM FOR BREAST CANCER: ICD-10-CM

## 2022-01-19 PROCEDURE — 77067 SCR MAMMO BI INCL CAD: CPT

## 2022-01-19 PROCEDURE — 77063 BREAST TOMOSYNTHESIS BI: CPT

## 2022-01-19 NOTE — TELEPHONE ENCOUNTER
I will not send in Tylenol 3 for her migraines    She should be evaluated either in our office or by a neurologist

## 2022-01-19 NOTE — TELEPHONE ENCOUNTER
She gets migraines and is asking if Esequiel Sewell would send a RX to [facility] for Tylenol 3        [phone] (H)

## 2022-01-21 RX ORDER — ALBUTEROL SULFATE 90 UG/1
2 AEROSOL, METERED RESPIRATORY (INHALATION) EVERY 6 HOURS PRN
Qty: 1 G | Refills: 0 | Status: SHIPPED | OUTPATIENT
Start: 2022-01-21

## 2022-01-21 RX ORDER — ALBUTEROL SULFATE 2.5 MG/3ML
2.5 SOLUTION RESPIRATORY (INHALATION) EVERY 6 HOURS PRN
Qty: 30 ML | Refills: 0 | Status: SHIPPED | OUTPATIENT
Start: 2022-01-21

## 2022-01-24 ENCOUNTER — APPOINTMENT (OUTPATIENT)
Dept: LAB | Facility: CLINIC | Age: 52
End: 2022-01-24
Payer: COMMERCIAL

## 2022-01-25 ENCOUNTER — TELEPHONE (OUTPATIENT)
Dept: OBGYN CLINIC | Facility: CLINIC | Age: 52
End: 2022-01-25

## 2022-01-25 NOTE — TELEPHONE ENCOUNTER
----- Message from Berenice Swift, [address] sent at 1/20/2022  5:20 PM EST -----  Please call with normal mammo     Thank you

## 2022-01-28 ENCOUNTER — TELEPHONE (OUTPATIENT)
Dept: INTERNAL MEDICINE CLINIC | Facility: CLINIC | Age: 52
End: 2022-01-28

## 2022-01-28 DIAGNOSIS — D72.829 LEUKOCYTOSIS, UNSPECIFIED TYPE: Primary | ICD-10-CM

## 2022-01-28 NOTE — TELEPHONE ENCOUNTER
Please let the patient know referral was placed to Hematology  Please provide her with the number for [facility]ematology group

## 2022-01-28 NOTE — TELEPHONE ENCOUNTER
PT has been seeing a psychiatrist   (Dr Eunice Rice)   labs done- noticing a rise in pts WBC - Recommend pt to see a Hematologist  Pt would like a referral    Please call if/when this can be done    Pt # [phone]

## 2022-01-31 ENCOUNTER — TELEPHONE (OUTPATIENT)
Dept: HEMATOLOGY ONCOLOGY | Facility: CLINIC | Age: 52
End: 2022-01-31

## 2022-01-31 NOTE — TELEPHONE ENCOUNTER
New Patient Intake Form   Patient Details:    Qi Savage  1970  [id number]    Appointment Information   Who is calling to schedule? [place]   If not self, what is the caller's name? Please put name of RBC nurse as well  Referring provider Delvin Ward   What is the diagnosis? Leukocytosis   Is there a confirmed tissue diagnosis? No   Is patient aware of diagnosis? Yes   Have you had any imaging or labs done? If yes, where? (If imaging done outside of [facility], please remind patient to bring a disk ) Yes   Have you been seen by another Oncologist/Hematologist?  If so, who and where? No   Are the records in [facility] or Care Everywhere? Yes   Are records needed from an outside facility? No   If yes, Name of facility, city and state where facility is located  Preferred [place]   Is the patient willing to be seen by another provider?   (This is for breast patients only)    Miscellaneous Information:

## 2022-02-01 ENCOUNTER — APPOINTMENT (OUTPATIENT)
Dept: LAB | Facility: CLINIC | Age: 52
End: 2022-02-01
Payer: COMMERCIAL

## 2022-02-02 ENCOUNTER — TELEPHONE (OUTPATIENT)
Dept: INTERNAL MEDICINE CLINIC | Facility: CLINIC | Age: 52
End: 2022-02-02

## 2022-02-02 DIAGNOSIS — K64.9 HEMORRHOIDS, UNSPECIFIED HEMORRHOID TYPE: Primary | ICD-10-CM

## 2022-02-02 RX ORDER — DIAPER,BRIEF,INFANT-TODD,DISP
EACH MISCELLANEOUS 4 TIMES DAILY PRN
Qty: 30 G | Refills: 0 | Status: SHIPPED | OUTPATIENT
Start: 2022-02-02 | End: 2022-07-22 | Stop reason: ALTCHOICE

## 2022-02-02 NOTE — TELEPHONE ENCOUNTER
Please discontinue hydrocortisone-pramoxine (PROCTOFOAM-HC) 1-1 % FOAM rectal foam it didn't work at all  Can we just call in Preparation H to see if that works  [facility]

## 2022-02-02 NOTE — TELEPHONE ENCOUNTER
Let the patient know that preparation H was sent to her pharmacy    Make her aware it is the same medication in preparation H that is in Proctofoam

## 2022-02-09 ENCOUNTER — APPOINTMENT (OUTPATIENT)
Dept: LAB | Facility: CLINIC | Age: 52
End: 2022-02-09
Payer: COMMERCIAL

## 2022-02-14 ENCOUNTER — APPOINTMENT (OUTPATIENT)
Dept: LAB | Facility: CLINIC | Age: 52
End: 2022-02-14
Payer: COMMERCIAL

## 2022-02-14 DIAGNOSIS — Z79.899 ENCOUNTER FOR LONG-TERM (CURRENT) USE OF OTHER MEDICATIONS: ICD-10-CM

## 2022-02-14 LAB
ALBUMIN SERPL BCP-MCNC: 3.5 G/DL (ref 3.5–5)
ALP SERPL-CCNC: 82 U/L (ref 46–116)
ALT SERPL W P-5'-P-CCNC: 27 U/L (ref 12–78)
ANION GAP SERPL CALCULATED.3IONS-SCNC: 3 MMOL/L (ref 4–13)
AST SERPL W P-5'-P-CCNC: 20 U/L (ref 5–45)
BILIRUB SERPL-MCNC: 0.26 MG/DL (ref 0.2–1)
BUN SERPL-MCNC: 8 MG/DL (ref 5–25)
CALCIUM SERPL-MCNC: 9.2 MG/DL (ref 8.3–10.1)
CHLORIDE SERPL-SCNC: 105 MMOL/L (ref 100–108)
CHOLEST SERPL-MCNC: 184 MG/DL
CO2 SERPL-SCNC: 27 MMOL/L (ref 21–32)
CREAT SERPL-MCNC: 0.81 MG/DL (ref 0.6–1.3)
GFR SERPL CREATININE-BSD FRML MDRD: 84 ML/MIN/1.73SQ M
GLUCOSE P FAST SERPL-MCNC: 97 MG/DL (ref 65–99)
HDLC SERPL-MCNC: 41 MG/DL
LDLC SERPL CALC-MCNC: 100 MG/DL (ref 0–100)
NONHDLC SERPL-MCNC: 143 MG/DL
POTASSIUM SERPL-SCNC: 3.7 MMOL/L (ref 3.5–5.3)
PROT SERPL-MCNC: 8 G/DL (ref 6.4–8.2)
SODIUM SERPL-SCNC: 135 MMOL/L (ref 136–145)
TRIGL SERPL-MCNC: 213 MG/DL

## 2022-02-14 PROCEDURE — 80061 LIPID PANEL: CPT

## 2022-02-14 PROCEDURE — 80053 COMPREHEN METABOLIC PANEL: CPT

## 2022-02-16 ENCOUNTER — TELEPHONE (OUTPATIENT)
Dept: INTERNAL MEDICINE CLINIC | Facility: CLINIC | Age: 52
End: 2022-02-16

## 2022-02-16 NOTE — TELEPHONE ENCOUNTER
Patient is requesting an order be sent to  [employer] [facility]  Adult -size Medium Pullups (Night time use)  Large Pads (day time usage)  Send to:    [facility] [facility] [facility]  PH: [phone]  FAX: [phone]

## 2022-02-17 ENCOUNTER — NURSE TRIAGE (OUTPATIENT)
Dept: INTERNAL MEDICINE CLINIC | Facility: CLINIC | Age: 52
End: 2022-02-17

## 2022-02-17 ENCOUNTER — TELEPHONE (OUTPATIENT)
Dept: OTHER | Facility: OTHER | Age: 52
End: 2022-02-17

## 2022-02-17 PROBLEM — N39.45 CONTINUOUS LEAKAGE OF URINE: Status: ACTIVE | Noted: 2022-02-17

## 2022-02-18 DIAGNOSIS — Z00.00 HEALTHCARE MAINTENANCE: Primary | ICD-10-CM

## 2022-02-18 PROBLEM — N39.46 MIXED STRESS AND URGE URINARY INCONTINENCE: Status: ACTIVE | Noted: 2022-02-18

## 2022-02-19 RX ORDER — MULTIVITAMIN
1 TABLET ORAL DAILY
Qty: 90 TABLET | Refills: 0 | Status: SHIPPED | OUTPATIENT
Start: 2022-02-19 | End: 2022-04-27

## 2022-02-21 ENCOUNTER — TELEPHONE (OUTPATIENT)
Dept: INTERNAL MEDICINE CLINIC | Facility: CLINIC | Age: 52
End: 2022-02-21

## 2022-02-21 ENCOUNTER — APPOINTMENT (OUTPATIENT)
Dept: LAB | Facility: CLINIC | Age: 52
End: 2022-02-21
Payer: COMMERCIAL

## 2022-02-22 ENCOUNTER — TELEPHONE (OUTPATIENT)
Dept: INTERNAL MEDICINE CLINIC | Facility: CLINIC | Age: 52
End: 2022-02-22

## 2022-02-22 DIAGNOSIS — E78.5 HYPERLIPIDEMIA: ICD-10-CM

## 2022-02-22 DIAGNOSIS — K21.9 GASTROESOPHAGEAL REFLUX DISEASE, UNSPECIFIED WHETHER ESOPHAGITIS PRESENT: ICD-10-CM

## 2022-02-22 RX ORDER — FENOFIBRATE 145 MG/1
145 TABLET, COATED ORAL DAILY
Qty: 90 TABLET | Refills: 0 | Status: SHIPPED | OUTPATIENT
Start: 2022-02-22 | End: 2022-04-13

## 2022-02-22 RX ORDER — PANTOPRAZOLE SODIUM 40 MG/1
40 TABLET, DELAYED RELEASE ORAL
Qty: 90 TABLET | Refills: 0 | Status: SHIPPED | OUTPATIENT
Start: 2022-02-22 | End: 2022-04-13

## 2022-03-02 ENCOUNTER — TELEPHONE (OUTPATIENT)
Dept: HEMATOLOGY ONCOLOGY | Facility: CLINIC | Age: 52
End: 2022-03-02

## 2022-03-02 ENCOUNTER — APPOINTMENT (OUTPATIENT)
Dept: LAB | Facility: CLINIC | Age: 52
End: 2022-03-02
Payer: COMMERCIAL

## 2022-03-02 NOTE — TELEPHONE ENCOUNTER
Appointment Cancellation Or Reschedule     Person calling in Patient    Provider Cite Jonnathan Hines   Office Visit Date and Time  3/2/22 9:00 am   Office Visit Location [facility]   Did patient want to reschedule their office appointment? If so, when was it scheduled to? no   Is this patient calling to reschedule an infusion appointment? no   When is their next infusion appointment? n/a   Is this patient a Chemo patient? no   Reason for Cancellation or Reschedule Patient stating she has no transportation to appointment and will call back at a later date to reschedule the appointment  If the patient is a treatment patient, please route this to the office nurse  If the patient is not on treatment, please route to the office MA

## 2022-03-02 NOTE — TELEPHONE ENCOUNTER
Reached out to pt to reschedule np pt appt, pt stated she will contact office when she's able to find transportation

## 2022-03-04 ENCOUNTER — TELEPHONE (OUTPATIENT)
Dept: HEMATOLOGY ONCOLOGY | Facility: MEDICAL CENTER | Age: 52
End: 2022-03-04

## 2022-03-04 NOTE — TELEPHONE ENCOUNTER
Status of NP referral has been changed to 'pt coordinating' due to 3/2/22 appt being cancelled due to lack of transportation, pt to call back

## 2022-03-07 ENCOUNTER — TELEPHONE (OUTPATIENT)
Dept: PAIN MEDICINE | Facility: CLINIC | Age: 52
End: 2022-03-07

## 2022-03-07 ENCOUNTER — APPOINTMENT (OUTPATIENT)
Dept: LAB | Facility: CLINIC | Age: 52
End: 2022-03-07
Payer: COMMERCIAL

## 2022-03-07 NOTE — TELEPHONE ENCOUNTER
S/w pt  Pt would like to repeat left sided SI joint injection last done 9/23  States it worked great and pain is just starting to return  [phone] Deb Bryant to schedule repeat injection?

## 2022-03-07 NOTE — TELEPHONE ENCOUNTER
Pt called in to schedule an injection  Please be advised thank you    Pt can be reached @   [phone]

## 2022-03-08 ENCOUNTER — TELEPHONE (OUTPATIENT)
Dept: HEMATOLOGY ONCOLOGY | Facility: MEDICAL CENTER | Age: 52
End: 2022-03-08

## 2022-03-08 NOTE — TELEPHONE ENCOUNTER
Working off [address] [facility] - Pt's status was changed to "pt coordinating" transportation issues  Pt will call to schedule

## 2022-03-09 ENCOUNTER — TELEPHONE (OUTPATIENT)
Dept: INTERNAL MEDICINE CLINIC | Facility: CLINIC | Age: 52
End: 2022-03-09

## 2022-03-09 NOTE — TELEPHONE ENCOUNTER
Patient would like an order for smoking cessation patches   Not sure what her insurance plan covers but would like to get an order sent to [facility]

## 2022-03-10 ENCOUNTER — TELEPHONE (OUTPATIENT)
Dept: HEMATOLOGY ONCOLOGY | Facility: CLINIC | Age: 52
End: 2022-03-10

## 2022-03-10 DIAGNOSIS — Z72.0 TOBACCO ABUSE: Primary | ICD-10-CM

## 2022-03-10 RX ORDER — NICOTINE 21 MG/24HR
1 PATCH, TRANSDERMAL 24 HOURS TRANSDERMAL EVERY 24 HOURS
Qty: 42 PATCH | Refills: 0 | Status: SHIPPED | OUTPATIENT
Start: 2022-03-10 | End: 2022-03-28 | Stop reason: ALTCHOICE

## 2022-03-11 DIAGNOSIS — E55.9 VITAMIN D DEFICIENCY: ICD-10-CM

## 2022-03-11 RX ORDER — ACETAMINOPHEN 160 MG
TABLET,DISINTEGRATING ORAL
Qty: 28 CAPSULE | Refills: 4 | Status: SHIPPED | OUTPATIENT
Start: 2022-03-11 | End: 2022-07-22 | Stop reason: ALTCHOICE

## 2022-03-14 NOTE — TELEPHONE ENCOUNTER
S/w pt and scheduled Left SI Joint injection for 3/17/22 9 am  Gave pre procedure instructions and /vaccine policy

## 2022-03-15 ENCOUNTER — APPOINTMENT (OUTPATIENT)
Dept: LAB | Facility: CLINIC | Age: 52
End: 2022-03-15
Payer: COMMERCIAL

## 2022-03-16 ENCOUNTER — TELEPHONE (OUTPATIENT)
Dept: GYNECOLOGIC ONCOLOGY | Facility: CLINIC | Age: 52
End: 2022-03-16

## 2022-03-17 ENCOUNTER — TELEPHONE (OUTPATIENT)
Dept: INTERNAL MEDICINE CLINIC | Facility: CLINIC | Age: 52
End: 2022-03-17

## 2022-03-17 ENCOUNTER — TELEPHONE (OUTPATIENT)
Dept: PAIN MEDICINE | Facility: MEDICAL CENTER | Age: 52
End: 2022-03-17

## 2022-03-17 NOTE — TELEPHONE ENCOUNTER
Fyi___ TOLD PATIENT SHE NEEDS APPOINTMENT SHE SAID SHE WOULD CALL BACK -
Pt inquired if Sierra Stockton would prescribe something for pains in stomach and severe sore throat without being seen  Advised pt that it sounds as though Esequiel Sewell would have to see her in the office and offered her a Same Day  Pt declined - wanted an earlier time  Advised pt that I would relay the message and someone would get back to her      [phone]
Offered and patient declined

## 2022-03-17 NOTE — TELEPHONE ENCOUNTER
Pt called stating that she needs to cancel her procedure today because she is sick   Pt will call back when she is feeling better   Pt can be reached at [phone]

## 2022-03-21 ENCOUNTER — APPOINTMENT (OUTPATIENT)
Dept: LAB | Facility: CLINIC | Age: 52
End: 2022-03-21
Payer: COMMERCIAL

## 2022-03-28 ENCOUNTER — OFFICE VISIT (OUTPATIENT)
Dept: INTERNAL MEDICINE CLINIC | Facility: CLINIC | Age: 52
End: 2022-03-28
Payer: COMMERCIAL

## 2022-03-28 ENCOUNTER — TELEPHONE (OUTPATIENT)
Dept: INTERNAL MEDICINE CLINIC | Facility: CLINIC | Age: 52
End: 2022-03-28

## 2022-03-28 ENCOUNTER — APPOINTMENT (OUTPATIENT)
Dept: LAB | Facility: CLINIC | Age: 52
End: 2022-03-28
Payer: COMMERCIAL

## 2022-03-28 VITALS
SYSTOLIC BLOOD PRESSURE: 122 MMHG | DIASTOLIC BLOOD PRESSURE: 78 MMHG | TEMPERATURE: 97.5 F | OXYGEN SATURATION: 93 % | HEART RATE: 105 BPM | HEIGHT: 60 IN | BODY MASS INDEX: 32.24 KG/M2 | WEIGHT: 164.2 LBS | RESPIRATION RATE: 17 BRPM

## 2022-03-28 DIAGNOSIS — R07.89 CHEST TIGHTNESS: ICD-10-CM

## 2022-03-28 DIAGNOSIS — J02.9 SORE THROAT: ICD-10-CM

## 2022-03-28 DIAGNOSIS — R11.0 NAUSEA: ICD-10-CM

## 2022-03-28 DIAGNOSIS — R52 BODY ACHES: ICD-10-CM

## 2022-03-28 DIAGNOSIS — R49.0 HOARSENESS: ICD-10-CM

## 2022-03-28 DIAGNOSIS — R10.31 RIGHT LOWER QUADRANT ABDOMINAL PAIN: Primary | ICD-10-CM

## 2022-03-28 DIAGNOSIS — R10.31 RIGHT LOWER QUADRANT ABDOMINAL PAIN: ICD-10-CM

## 2022-03-28 LAB
ALBUMIN SERPL BCP-MCNC: 3.7 G/DL (ref 3.5–5)
ALP SERPL-CCNC: 85 U/L (ref 46–116)
ALT SERPL W P-5'-P-CCNC: 30 U/L (ref 12–78)
AMYLASE SERPL-CCNC: 46 IU/L (ref 25–115)
ANION GAP SERPL CALCULATED.3IONS-SCNC: 5 MMOL/L (ref 4–13)
AST SERPL W P-5'-P-CCNC: 22 U/L (ref 5–45)
BILIRUB SERPL-MCNC: 0.25 MG/DL (ref 0.2–1)
BUN SERPL-MCNC: 8 MG/DL (ref 5–25)
CALCIUM SERPL-MCNC: 9.9 MG/DL (ref 8.3–10.1)
CHLORIDE SERPL-SCNC: 105 MMOL/L (ref 100–108)
CO2 SERPL-SCNC: 29 MMOL/L (ref 21–32)
CREAT SERPL-MCNC: 0.76 MG/DL (ref 0.6–1.3)
GFR SERPL CREATININE-BSD FRML MDRD: 91 ML/MIN/1.73SQ M
GLUCOSE SERPL-MCNC: 71 MG/DL (ref 65–140)
LIPASE SERPL-CCNC: 110 U/L (ref 73–393)
POTASSIUM SERPL-SCNC: 4.2 MMOL/L (ref 3.5–5.3)
PROT SERPL-MCNC: 7.7 G/DL (ref 6.4–8.2)
SODIUM SERPL-SCNC: 139 MMOL/L (ref 136–145)
TSH SERPL DL<=0.05 MIU/L-ACNC: 1.31 UIU/ML (ref 0.36–3.74)

## 2022-03-28 PROCEDURE — 80053 COMPREHEN METABOLIC PANEL: CPT

## 2022-03-28 PROCEDURE — 99214 OFFICE O/P EST MOD 30 MIN: CPT | Performed by: NURSE PRACTITIONER

## 2022-03-28 PROCEDURE — 84443 ASSAY THYROID STIM HORMONE: CPT

## 2022-03-28 PROCEDURE — 83690 ASSAY OF LIPASE: CPT

## 2022-03-28 PROCEDURE — 82150 ASSAY OF AMYLASE: CPT

## 2022-03-28 RX ORDER — CLOZAPINE 200 MG/1
200 TABLET ORAL
COMMUNITY
Start: 2022-03-24

## 2022-03-28 RX ORDER — OLANZAPINE 20 MG/1
10 TABLET ORAL 2 TIMES DAILY
COMMUNITY
Start: 2022-03-11

## 2022-03-28 RX ORDER — CLOZAPINE 100 MG/1
100 TABLET ORAL 2 TIMES DAILY
COMMUNITY
Start: 2022-03-24

## 2022-03-28 RX ORDER — ESCITALOPRAM OXALATE 20 MG/1
20 TABLET ORAL DAILY
COMMUNITY
Start: 2022-01-10

## 2022-03-28 RX ORDER — ACETAMINOPHEN 325 MG/1
650 TABLET ORAL EVERY 6 HOURS PRN
Qty: 30 TABLET | Refills: 0 | Status: SHIPPED | OUTPATIENT
Start: 2022-03-28 | End: 2022-06-13

## 2022-03-28 NOTE — ASSESSMENT & PLAN NOTE
The patient complaining of some hoarseness of her voice  She is concerned for yeast infection  Her exam was negative  Thyroid blood work has been ordered  I will contact the patient with her results  An ENT consult may be warranted   Smoking cessation recommended

## 2022-03-28 NOTE — ASSESSMENT & PLAN NOTE
The patient complains of of shortness of breath with exertion and some chest tightness along with wheezing  The patient's exam does show some decreased lung sounds in the bases however was normal otherwise  The patient is not short of breath in the office today  Her BMI is 32  She does continue to smoke  She did not use the nicotine patches as recommended  A CT scan of the chest performed last August was negative  I will order a stat chest x-ray to be done today  Patient has been encouraged to stop smoking

## 2022-03-28 NOTE — ASSESSMENT & PLAN NOTE
Patient presents to the office today for concerns of right lower quadrant abdominal pain  She does have a history of pancreatitis  She is also complaining of nausea  She states this pain is off and on over the past several weeks  She states "I feel like my organs are shutting down"  Patient's exam does show some tenderness in the right lower quadrant  She does not appear to be in distress  I did have a conversation with the patient regarding a CT scan of the abdomen and pelvis and the ability to have this done on an emergent basis in the outpatient setting  I do not believe the patient needs a stat CT scan based on her presentation and her exam today  I did recommend to the patient however that if she feels her symptoms are worsening or she develops any new or worrisome symptoms she should be evaluated in emergency room  A CT scan will be performed on outpatient basis  6

## 2022-03-28 NOTE — PROGRESS NOTES
[facility]    NAME: Trini Baum  AGE: 49 y o  SEX: female  : 1970     DATE: 3/28/2022     Assessment and Plan:     Problem List Items Addressed This Visit        Other    Right lower quadrant abdominal pain - Primary       Patient presents to the office today for concerns of right lower quadrant abdominal pain  She does have a history of pancreatitis  She is also complaining of nausea  She states this pain is off and on over the past several weeks  She states "I feel like my organs are shutting down"  Patient's exam does show some tenderness in the right lower quadrant  She does not appear to be in distress  I did have a conversation with the patient regarding a CT scan of the abdomen and pelvis and the ability to have this done on an emergent basis in the outpatient setting  I do not believe the patient needs a stat CT scan based on her presentation and her exam today  I did recommend to the patient however that if she feels her symptoms are worsening or she develops any new or worrisome symptoms she should be evaluated in emergency room  A CT scan will be performed on outpatient basis  Relevant Orders    CBC and differential    Comprehensive metabolic panel    CT abdomen pelvis w contrast    Amylase    Lipase    Chest tightness       The patient complains of of shortness of breath with exertion and some chest tightness along with wheezing  The patient's exam does show some decreased lung sounds in the bases however was normal otherwise  The patient is not short of breath in the office today  Her BMI is 32  She does continue to smoke  She did not use the nicotine patches as recommended  A CT scan of the chest performed last August was negative  I will order a stat chest x-ray to be done today  Patient has been encouraged to stop smoking           Relevant Orders    XR chest pa & lateral    Hoarseness       The patient complaining of some hoarseness of her voice  She is concerned for yeast infection  Her exam was negative  Thyroid blood work has been ordered  I will contact the patient with her results  An ENT consult may be warranted  Smoking cessation recommended           Other Visit Diagnoses     Sore throat        Relevant Orders    TSH, 3rd generation with Free T4 reflex    Nausea        Relevant Orders    CBC and differential    Comprehensive metabolic panel    CT abdomen pelvis w contrast    Amylase    Lipase    Body aches        Relevant Medications    acetaminophen (TYLENOL) 325 mg tablet          BMI Counseling: Body mass index is 32 07 kg/m²  The BMI is above normal  Nutrition recommendations include decreasing portion sizes, consuming healthier snacks, limiting drinks that contain sugar, moderation in carbohydrate intake, increasing intake of lean protein, reducing intake of saturated and trans fat and reducing intake of cholesterol  Exercise recommendations include exercising 3-5 times per week  Rationale for BMI follow-up plan is due to patient being overweight or obese  Depression Screening and Follow-up Plan: Patient was screened for depression during today's encounter  They screened negative with a PHQ-2 score of 0  No follow-ups on file  Chief Complaint:     Chief Complaint   Patient presents with    Follow-up     patient thinks she has bronchitis or Pneumonia        History of Present Illness:      patient presents to the office today with concerns of abdominal pain that has been ongoing for several months as well as shortness of breath with exertion  She continues to smoke  She is concerned she has bronchitis or pneumonia  Her exam shows decreased lung sounds in the bases  A stat chest x-ray has been ordered  CT scan of the abdomen and pelvis has been ordered on an outpatient basis  Blood work has been ordered       Review of Systems:     Review of Systems   Constitutional: Negative for activity change, fatigue and fever  HENT: Positive for rhinorrhea  Negative for congestion, hearing loss, trouble swallowing and voice change  Eyes: Negative for photophobia, pain, discharge and visual disturbance  Respiratory: Positive for cough (productive), chest tightness, shortness of breath (with exertion) and wheezing  Cardiovascular: Negative for chest pain, palpitations and leg swelling  Gastrointestinal: Positive for abdominal pain (right lower quadrant) and nausea  Negative for blood in stool, constipation and vomiting  Endocrine: Negative for cold intolerance and heat intolerance  Genitourinary: Negative for difficulty urinating, frequency, hematuria, urgency, vaginal bleeding and vaginal discharge  Musculoskeletal: Negative for arthralgias and myalgias  Skin: Negative  Neurological: Negative for dizziness, weakness, numbness and headaches  Psychiatric/Behavioral: Negative for decreased concentration  The patient is not nervous/anxious           Problem List:     Patient Active Problem List   Diagnosis    Schizoaffective disorder, bipolar type ([facility] [address] )    LYNN (generalized anxiety disorder)    Slow transit constipation    Degenerative disc disease, lumbar    Post-traumatic stress disorder, chronic    Mild intermittent asthma without complication    Tobacco abuse    Gastroesophageal reflux disease    Otitis media    Vitamin D deficiency    Hemorrhoids    Continuous leakage of urine    Mixed stress and urge urinary incontinence        Objective:     /78 (BP Location: Left arm, Patient Position: Sitting, Cuff Size: Standard)   Pulse 105   Temp 97 5 °F (36 4 °C) (Temporal) Comment: no nsaids  Resp 17   Ht 5' (1 524 m)   Wt 74 5 kg (164 lb 3 2 oz) Comment: with boots on  SpO2 93%   BMI 32 07 kg/m²     Current Outpatient Medications   Medication Instructions    acetaminophen (TYLENOL) 650 mg, Oral, Every 6 hours PRN    albuterol (PROVENTIL HFA,VENTOLIN HFA) 90 mcg/act inhaler 2 puffs, Inhalation, Every 6 hours PRN    albuterol 2 5 mg, Nebulization, Every 6 hours PRN    benzonatate (TESSALON PERLES) 100 mg, Oral, 2 times daily PRN    benztropine (COGENTIN) 1 mg, Oral, 2 times daily    Cholecalciferol (Vitamin D3) 50 MCG (2000 UT) capsule 1 CAP ORALLY EVERY MORNING DX: SUPPLEMENT    cloZAPine (CLOZARIL) 100 mg tablet No dose, route, or frequency recorded   clozapine (CLOZARIL) 200 MG tablet No dose, route, or frequency recorded   Diclofenac Sodium (VOLTAREN) 2 g, Topical, 4 times daily PRN    diphenhydrAMINE (BENADRYL) 25 mg, Oral, Daily at bedtime    escitalopram (LEXAPRO) 10 mg tablet No dose, route, or frequency recorded   fenofibrate (TRICOR) 145 mg, Oral, Daily    gabapentin (NEURONTIN) 600 mg, Oral, 4 times daily    Halls Cough Drops 5 8 mg, Mouth/Throat, Every 2 hour PRN    hydrocortisone 1 % cream Topical, 4 times daily PRN    hydrOXYzine HCL (ATARAX) 25 mg, Oral, Every 6 hours PRN    Incontinence Supply Disposable (Depend Underwear Sm/Med) MISC Does not apply, 3 times daily PRN    LORazepam (ATIVAN) 0 5 mg, Oral, 4 times daily    Misc Natural Products (Osteo Bi-Flex Adv Triple St) TABS 1 TAB ORALLY TWICE DAILY DX: SUPPLEMENT    Multiple Vitamin (multivitamin) tablet 1 tablet, Oral, Daily    OLANZapine (ZyPREXA ZYDIS) 5 mg dispersible tablet As prn for voices, stop risperdone a sprn    OLANZapine (ZyPREXA) 20 MG tablet No dose, route, or frequency recorded   pantoprazole (PROTONIX) 40 mg, Oral, Daily (early morning)    polyethylene glycol (MIRALAX) 17 g, Oral, Daily    Pramox-PE-Glycerin-Petrolatum (PREPARATION H MAX) 1-0 25-14 4-15 % rectal cream 1 application, Rectal, 4 times daily PRN    prazosin (MINIPRESS) 1 mg, Oral, Daily at bedtime    tiZANidine (ZANAFLEX) 4 mg tablet 1 TAB ORALLY EVERY 8 HOURS AS NEEDED FOR MUSCLE SPASMS         Physical Exam  Vitals reviewed  Constitutional:       Appearance: Normal appearance   She is obese    HENT:      Head: Normocephalic  Nose: Nose normal       Mouth/Throat:      Mouth: Mucous membranes are moist       Pharynx: Oropharynx is clear  Eyes:      Extraocular Movements: Extraocular movements intact  Pupils: Pupils are equal, round, and reactive to light  Cardiovascular:      Rate and Rhythm: Normal rate and regular rhythm  Pulses: Normal pulses  Heart sounds: Normal heart sounds  Pulmonary:      Effort: Pulmonary effort is normal       Breath sounds: Examination of the right-lower field reveals decreased breath sounds  Examination of the left-lower field reveals decreased breath sounds  Decreased breath sounds present  Abdominal:      General: Bowel sounds are normal  There is no distension  Palpations: Abdomen is soft  There is no mass  Tenderness: There is no abdominal tenderness  There is no guarding or rebound  Hernia: No hernia is present  Musculoskeletal:         General: Normal range of motion  Skin:     General: Skin is warm and dry  Neurological:      General: No focal deficit present  Mental Status: She is alert and oriented to person, place, and time  Psychiatric:         Mood and Affect: Mood normal          Behavior: Behavior normal          Thought Content:  Thought content normal          Judgment: Judgment normal          [facility], [address] [place] [address]

## 2022-03-28 NOTE — TELEPHONE ENCOUNTER
----- Message from Letty Auguste sent at 3/28/2022  4:22 PM EDT -----    Please call patient make her aware that her blood work performed today is normal

## 2022-03-29 ENCOUNTER — APPOINTMENT (OUTPATIENT)
Dept: RADIOLOGY | Facility: CLINIC | Age: 52
End: 2022-03-29
Payer: COMMERCIAL

## 2022-03-29 ENCOUNTER — TELEPHONE (OUTPATIENT)
Dept: INTERNAL MEDICINE CLINIC | Facility: CLINIC | Age: 52
End: 2022-03-29

## 2022-03-29 DIAGNOSIS — R07.89 CHEST TIGHTNESS: ICD-10-CM

## 2022-03-29 PROCEDURE — 71046 X-RAY EXAM CHEST 2 VIEWS: CPT

## 2022-03-29 NOTE — TELEPHONE ENCOUNTER
Two messages:     1st one: Pt called and would like her results from her chest x-ray today     2nd one: She also needs something for the pain in her lungs; feels like stabbing and pinching- hurts a lot    Send to [facility]

## 2022-03-29 NOTE — TELEPHONE ENCOUNTER
Pt is waiting to get her chest x-ray done- she's wondering why Christal Buenrostro only ordered the chest x-ray- since she feels like her lungs are collapsing

## 2022-03-29 NOTE — TELEPHONE ENCOUNTER
S/w Pt she states she has been ill x one and one half weeks and feels as if her lung is collapsing  Pt is looking for Rx post Rx of chest Advised pt to go ER or U/C to address  Pt states she does not drive  Requested and urged Pt to call a friend or family to take her  She stated she would try  Again reviewed if pt felt her lung was collapsing need to seek medical intervention asap  Pt agreed and will call for assistance with a ride

## 2022-03-30 ENCOUNTER — TELEPHONE (OUTPATIENT)
Dept: INTERNAL MEDICINE CLINIC | Facility: CLINIC | Age: 52
End: 2022-03-30

## 2022-03-30 DIAGNOSIS — J84.9 INTERSTITIAL LUNG DISEASE (HCC): Primary | ICD-10-CM

## 2022-03-30 DIAGNOSIS — R93.89 ABNORMAL CXR: ICD-10-CM

## 2022-03-30 NOTE — TELEPHONE ENCOUNTER
I contacted the patient with the results of her chest x-ray which showed interstitial lung disease  A CT high-resolution CT scan was recommended  This has been ordered  I also informed the patient that if her symptoms continue or worsen she should be evaluated in the emergency room  Smoking cessation was discussed as well

## 2022-03-30 NOTE — TELEPHONE ENCOUNTER
Edinson Whaley calling back to see if Jimena Alexander read her xray yet  She needs something for the pain or an antibiotic  Feels like her lung is collapsing

## 2022-04-01 ENCOUNTER — TELEPHONE (OUTPATIENT)
Dept: INTERNAL MEDICINE CLINIC | Facility: CLINIC | Age: 52
End: 2022-04-01

## 2022-04-01 NOTE — TELEPHONE ENCOUNTER
CT Abd/Pelvis DENIED on: 4/1/22    In order to approve: results of other tests are needed- to be done 1st    Such as:   Blood, urine, x-rays, ultra sound, or  scope test    Lipase and Amylase were done- they were normal      Have to find out what labs are needed- have to call on Monday morning  A previous CT done with or w/o dye    An ultra sound can be ordered; to be done 1st      Those results would show why this test is needed    CT Abd/Pelvis isn't medically necessary    A peer to peer can be done- I have to see if it needs to be scheduled  P# with [facility] for the peer to peer is: [phone]  Tracking#: [id number]    The peer to peer doesn't have to be scheduled  You have up until this Fri, April 8 to do the peer to peer  Do you want to do a peer to peer?   The test is scheduled for Tues April 5 at 10AM     Please let me know; what you'd like to do

## 2022-04-04 DIAGNOSIS — R10.31 RIGHT LOWER QUADRANT ABDOMINAL PAIN: Primary | ICD-10-CM

## 2022-04-04 NOTE — TELEPHONE ENCOUNTER
No peer to peer needed  Please call patient and let her know that we need to cancel the CT scan the abdomen and pelvis but she should still have her lung CT scan that day  I will order an ultrasound of her right lower quadrant instead per insurance recommendations

## 2022-04-05 ENCOUNTER — APPOINTMENT (OUTPATIENT)
Dept: LAB | Facility: CLINIC | Age: 52
End: 2022-04-05
Payer: COMMERCIAL

## 2022-04-06 ENCOUNTER — TELEPHONE (OUTPATIENT)
Dept: INTERNAL MEDICINE CLINIC | Facility: CLINIC | Age: 52
End: 2022-04-06

## 2022-04-06 ENCOUNTER — HOSPITAL ENCOUNTER (OUTPATIENT)
Dept: ULTRASOUND IMAGING | Facility: CLINIC | Age: 52
Discharge: HOME | End: 2022-04-06
Payer: COMMERCIAL

## 2022-04-06 DIAGNOSIS — R10.31 RIGHT LOWER QUADRANT ABDOMINAL PAIN: ICD-10-CM

## 2022-04-06 PROCEDURE — 76705 ECHO EXAM OF ABDOMEN: CPT

## 2022-04-07 ENCOUNTER — TELEPHONE (OUTPATIENT)
Dept: NEUROLOGY | Facility: CLINIC | Age: 52
End: 2022-04-07

## 2022-04-07 ENCOUNTER — TELEPHONE (OUTPATIENT)
Dept: INTERNAL MEDICINE CLINIC | Facility: CLINIC | Age: 52
End: 2022-04-07

## 2022-04-07 DIAGNOSIS — Z82.0 FAMILY HISTORY OF NEUROLOGICAL DISEASE: Primary | ICD-10-CM

## 2022-04-07 NOTE — TELEPHONE ENCOUNTER
Patient has an appointment on April 14,2022 with Neurology Dr Evalee Gowers office     requesting order/referral for code Z82 0    A referral is in the system but its from [facility] , needs a NEW referral Katty Weems

## 2022-04-08 ENCOUNTER — TELEPHONE (OUTPATIENT)
Dept: INTERNAL MEDICINE CLINIC | Facility: CLINIC | Age: 52
End: 2022-04-08

## 2022-04-08 DIAGNOSIS — R10.31 RIGHT LOWER QUADRANT ABDOMINAL PAIN: ICD-10-CM

## 2022-04-08 DIAGNOSIS — R16.0 HEPATOMEGALY: Primary | ICD-10-CM

## 2022-04-08 NOTE — TELEPHONE ENCOUNTER
----- Message from Harris Pike, [address]  sent at 4/8/2022  1:23 PM EDT -----    Please let the patient know that her recent ultrasound shows an enlarged fatty liver  I have entered a referral for her to be evaluated by gastroenterology    They will contact her to make an appointment

## 2022-04-11 ENCOUNTER — TELEPHONE (OUTPATIENT)
Dept: INTERNAL MEDICINE CLINIC | Facility: CLINIC | Age: 52
End: 2022-04-11

## 2022-04-11 ENCOUNTER — APPOINTMENT (OUTPATIENT)
Dept: LAB | Facility: CLINIC | Age: 52
End: 2022-04-11
Payer: COMMERCIAL

## 2022-04-11 NOTE — TELEPHONE ENCOUNTER
Please have Nico Beltran discontinue osteo-biflex    She can't take it    She uses [facility] pharmacy in [place]

## 2022-04-12 ENCOUNTER — TELEPHONE (OUTPATIENT)
Dept: INTERNAL MEDICINE CLINIC | Facility: CLINIC | Age: 52
End: 2022-04-12

## 2022-04-12 DIAGNOSIS — Z82.0 FAMILY HISTORY OF NEUROLOGICAL DISEASE: Primary | ICD-10-CM

## 2022-04-12 NOTE — TELEPHONE ENCOUNTER
PT  HAS  AN  APPT   4/14  NEEDS  A  NEW  DR  ORDER  PUT  IN THE  SYSTEM  FOR  THE   PT      DIAG  Z82 0

## 2022-04-13 DIAGNOSIS — E78.5 HYPERLIPIDEMIA: ICD-10-CM

## 2022-04-13 DIAGNOSIS — K21.9 GASTROESOPHAGEAL REFLUX DISEASE, UNSPECIFIED WHETHER ESOPHAGITIS PRESENT: ICD-10-CM

## 2022-04-13 RX ORDER — PANTOPRAZOLE SODIUM 40 MG/1
TABLET, DELAYED RELEASE ORAL
Qty: 28 TABLET | Refills: 4 | Status: SHIPPED | OUTPATIENT
Start: 2022-04-13

## 2022-04-13 RX ORDER — FENOFIBRATE 145 MG/1
TABLET, COATED ORAL
Qty: 28 TABLET | Refills: 4 | Status: SHIPPED | OUTPATIENT
Start: 2022-04-13

## 2022-04-14 ENCOUNTER — CONSULT (OUTPATIENT)
Dept: NEUROLOGY | Facility: CLINIC | Age: 52
End: 2022-04-14
Payer: COMMERCIAL

## 2022-04-14 VITALS
WEIGHT: 169 LBS | HEART RATE: 109 BPM | BODY MASS INDEX: 33.18 KG/M2 | HEIGHT: 60 IN | SYSTOLIC BLOOD PRESSURE: 120 MMHG | DIASTOLIC BLOOD PRESSURE: 82 MMHG

## 2022-04-14 DIAGNOSIS — R41.3 MEMORY DIFFICULTY: ICD-10-CM

## 2022-04-14 DIAGNOSIS — M51.36 DEGENERATIVE DISC DISEASE, LUMBAR: Primary | ICD-10-CM

## 2022-04-14 DIAGNOSIS — F25.0 SCHIZOAFFECTIVE DISORDER, BIPOLAR TYPE (HCC): Chronic | ICD-10-CM

## 2022-04-14 DIAGNOSIS — Z72.0 TOBACCO ABUSE: ICD-10-CM

## 2022-04-14 PROCEDURE — 99244 OFF/OP CNSLTJ NEW/EST MOD 40: CPT | Performed by: PSYCHIATRY & NEUROLOGY

## 2022-04-14 RX ORDER — LIDOCAINE 50 MG/G
PATCH TOPICAL
Qty: 30 PATCH | Refills: 4 | Status: SHIPPED | OUTPATIENT
Start: 2022-04-14
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
## 2022-04-19 ENCOUNTER — APPOINTMENT (OUTPATIENT)
Dept: LAB | Facility: CLINIC | Age: 52
End: 2022-04-19
Payer: COMMERCIAL

## 2022-04-25 ENCOUNTER — TELEPHONE (OUTPATIENT)
Dept: HEMATOLOGY ONCOLOGY | Facility: CLINIC | Age: 52
End: 2022-04-25

## 2022-04-25 ENCOUNTER — APPOINTMENT (OUTPATIENT)
Dept: LAB | Facility: CLINIC | Age: 52
End: 2022-04-25
Payer: COMMERCIAL

## 2022-04-25 DIAGNOSIS — R41.3 MEMORY DIFFICULTY: ICD-10-CM

## 2022-04-25 LAB
ALBUMIN SERPL BCP-MCNC: 3.7 G/DL (ref 3.5–5)
ALP SERPL-CCNC: 81 U/L (ref 46–116)
ALT SERPL W P-5'-P-CCNC: 26 U/L (ref 12–78)
ANION GAP SERPL CALCULATED.3IONS-SCNC: 5 MMOL/L (ref 4–13)
AST SERPL W P-5'-P-CCNC: 16 U/L (ref 5–45)
BILIRUB SERPL-MCNC: 0.3 MG/DL (ref 0.2–1)
BUN SERPL-MCNC: 10 MG/DL (ref 5–25)
CALCIUM SERPL-MCNC: 9.7 MG/DL (ref 8.3–10.1)
CHLORIDE SERPL-SCNC: 102 MMOL/L (ref 100–108)
CO2 SERPL-SCNC: 28 MMOL/L (ref 21–32)
CREAT SERPL-MCNC: 0.8 MG/DL (ref 0.6–1.3)
FOLATE SERPL-MCNC: >20 NG/ML (ref 3.1–17.5)
GFR SERPL CREATININE-BSD FRML MDRD: 86 ML/MIN/1.73SQ M
GLUCOSE P FAST SERPL-MCNC: 129 MG/DL (ref 65–99)
POTASSIUM SERPL-SCNC: 4.3 MMOL/L (ref 3.5–5.3)
PROT SERPL-MCNC: 7.8 G/DL (ref 6.4–8.2)
SODIUM SERPL-SCNC: 135 MMOL/L (ref 136–145)
VIT B12 SERPL-MCNC: 633 PG/ML (ref 100–900)

## 2022-04-25 PROCEDURE — 82746 ASSAY OF FOLIC ACID SERUM: CPT

## 2022-04-25 PROCEDURE — 86592 SYPHILIS TEST NON-TREP QUAL: CPT

## 2022-04-25 PROCEDURE — 86618 LYME DISEASE ANTIBODY: CPT

## 2022-04-25 PROCEDURE — 80053 COMPREHEN METABOLIC PANEL: CPT

## 2022-04-25 PROCEDURE — 82607 VITAMIN B-12: CPT

## 2022-04-25 NOTE — TELEPHONE ENCOUNTER
Scheduling Appointment     Who Is Calling to Schedule Patient    Doctor JAD NOLAN  [facility]   Date and Time 05/16 at 2:00pm   Reason for scheduling appointment shaheed new consult    Patient verbalized understanding    yes

## 2022-04-26 LAB
B BURGDOR IGG+IGM SER-ACNC: 23
RPR SER QL: NORMAL

## 2022-04-27 DIAGNOSIS — Z00.00 HEALTHCARE MAINTENANCE: ICD-10-CM

## 2022-04-27 RX ORDER — MULTIVITAMIN WITH FOLIC ACID 400 MCG
TABLET ORAL
Qty: 28 TABLET | Refills: 5 | Status: SHIPPED | OUTPATIENT
Start: 2022-04-27

## 2022-05-02 ENCOUNTER — APPOINTMENT (OUTPATIENT)
Dept: LAB | Facility: CLINIC | Age: 52
End: 2022-05-02
Payer: COMMERCIAL

## 2022-05-03 ENCOUNTER — TELEPHONE (OUTPATIENT)
Dept: INTERNAL MEDICINE CLINIC | Facility: CLINIC | Age: 52
End: 2022-05-03

## 2022-05-04 ENCOUNTER — TELEPHONE (OUTPATIENT)
Dept: INTERNAL MEDICINE CLINIC | Facility: CLINIC | Age: 52
End: 2022-05-04

## 2022-05-04 NOTE — TELEPHONE ENCOUNTER
Patient wanted to know if she could be tested for infection due to rat droppings  Because she was living in a rat infected home and eating food that may have been contaminated

## 2022-05-04 NOTE — TELEPHONE ENCOUNTER
Kreg Cinnamon called back for Emory reed pt  Advise pt of message from Alicia Gilmore " No test can be done at this time"

## 2022-05-09 ENCOUNTER — HOSPITAL ENCOUNTER (EMERGENCY)
Facility: HOSPITAL | Age: 52
End: 2022-05-10
Attending: EMERGENCY MEDICINE
Payer: COMMERCIAL

## 2022-05-09 DIAGNOSIS — R45.851 SUICIDAL IDEATION: Primary | ICD-10-CM

## 2022-05-09 LAB
AMPHETAMINES SERPL QL SCN: NEGATIVE
BARBITURATES UR QL: NEGATIVE
BENZODIAZ UR QL: NEGATIVE
COCAINE UR QL: NEGATIVE
ETHANOL EXG-MCNC: 0 MG/DL
FLUAV RNA RESP QL NAA+PROBE: NEGATIVE
FLUBV RNA RESP QL NAA+PROBE: NEGATIVE
METHADONE UR QL: NEGATIVE
OPIATES UR QL SCN: NEGATIVE
OXYCODONE+OXYMORPHONE UR QL SCN: NEGATIVE
PCP UR QL: NEGATIVE
RSV RNA RESP QL NAA+PROBE: NEGATIVE
SARS-COV-2 RNA RESP QL NAA+PROBE: NEGATIVE
THC UR QL: NEGATIVE

## 2022-05-09 PROCEDURE — 99285 EMERGENCY DEPT VISIT HI MDM: CPT

## 2022-05-09 PROCEDURE — 82075 ASSAY OF BREATH ETHANOL: CPT | Performed by: EMERGENCY MEDICINE

## 2022-05-09 PROCEDURE — 0241U HB NFCT DS VIR RESP RNA 4 TRGT: CPT | Performed by: EMERGENCY MEDICINE

## 2022-05-09 PROCEDURE — 80307 DRUG TEST PRSMV CHEM ANLYZR: CPT | Performed by: EMERGENCY MEDICINE

## 2022-05-09 PROCEDURE — 99284 EMERGENCY DEPT VISIT MOD MDM: CPT | Performed by: EMERGENCY MEDICINE

## 2022-05-09 RX ORDER — PANTOPRAZOLE SODIUM 40 MG/1
40 TABLET, DELAYED RELEASE ORAL EVERY MORNING
Status: DISCONTINUED | OUTPATIENT
Start: 2022-05-10 | End: 2022-05-10 | Stop reason: HOSPADM

## 2022-05-09 RX ORDER — BENZTROPINE MESYLATE 0.5 MG/1
1 TABLET ORAL 2 TIMES DAILY
Status: DISCONTINUED | OUTPATIENT
Start: 2022-05-09 | End: 2022-05-10 | Stop reason: HOSPADM

## 2022-05-09 RX ORDER — ESCITALOPRAM OXALATE 10 MG/1
20 TABLET ORAL DAILY
Status: DISCONTINUED | OUTPATIENT
Start: 2022-05-09 | End: 2022-05-10 | Stop reason: HOSPADM

## 2022-05-09 RX ORDER — PRAZOSIN HYDROCHLORIDE 1 MG/1
1 CAPSULE ORAL
Status: DISCONTINUED | OUTPATIENT
Start: 2022-05-09 | End: 2022-05-10 | Stop reason: HOSPADM

## 2022-05-09 RX ORDER — CLOZAPINE 100 MG/1
200 TABLET ORAL
Status: DISCONTINUED | OUTPATIENT
Start: 2022-05-09 | End: 2022-05-10 | Stop reason: HOSPADM

## 2022-05-09 RX ORDER — ALBUTEROL SULFATE 2.5 MG/3ML
2.5 SOLUTION RESPIRATORY (INHALATION) EVERY 6 HOURS PRN
Status: DISCONTINUED | OUTPATIENT
Start: 2022-05-09 | End: 2022-05-09

## 2022-05-09 RX ORDER — LORAZEPAM 0.5 MG/1
0.5 TABLET ORAL EVERY 8 HOURS PRN
Status: DISCONTINUED | OUTPATIENT
Start: 2022-05-09 | End: 2022-05-10 | Stop reason: HOSPADM

## 2022-05-09 RX ORDER — ACETAMINOPHEN 325 MG/1
650 TABLET ORAL EVERY 6 HOURS PRN
Status: DISCONTINUED | OUTPATIENT
Start: 2022-05-09 | End: 2022-05-10 | Stop reason: HOSPADM

## 2022-05-09 RX ORDER — CLOZAPINE 100 MG/1
100 TABLET ORAL 2 TIMES DAILY
Status: DISCONTINUED | OUTPATIENT
Start: 2022-05-09 | End: 2022-05-10 | Stop reason: HOSPADM

## 2022-05-09 RX ORDER — ALBUTEROL SULFATE 90 UG/1
2 AEROSOL, METERED RESPIRATORY (INHALATION) EVERY 6 HOURS PRN
Status: DISCONTINUED | OUTPATIENT
Start: 2022-05-09 | End: 2022-05-10 | Stop reason: HOSPADM

## 2022-05-09 RX ORDER — GABAPENTIN 300 MG/1
600 CAPSULE ORAL 4 TIMES DAILY
Status: DISCONTINUED | OUTPATIENT
Start: 2022-05-09 | End: 2022-05-10 | Stop reason: HOSPADM

## 2022-05-09 RX ORDER — NICOTINE 21 MG/24HR
21 PATCH, TRANSDERMAL 24 HOURS TRANSDERMAL ONCE
Status: COMPLETED | OUTPATIENT
Start: 2022-05-09 | End: 2022-05-10

## 2022-05-09 RX ADMIN — CLOZAPINE 100 MG: 100 TABLET ORAL at 20:00

## 2022-05-09 RX ADMIN — NICOTINE 21 MG: 21 PATCH, EXTENDED RELEASE TRANSDERMAL at 13:54

## 2022-05-09 RX ADMIN — PRAZOSIN HYDROCHLORIDE 1 MG: 1 CAPSULE ORAL at 22:32

## 2022-05-09 RX ADMIN — LORAZEPAM 0.5 MG: 0.5 TABLET ORAL at 20:07

## 2022-05-09 RX ADMIN — BENZTROPINE MESYLATE 1 MG: 0.5 TABLET ORAL at 19:51

## 2022-05-09 RX ADMIN — ACETAMINOPHEN 650 MG: 325 TABLET ORAL at 19:50

## 2022-05-09 RX ADMIN — CLOZAPINE 200 MG: 100 TABLET ORAL at 22:31

## 2022-05-09 RX ADMIN — GABAPENTIN 600 MG: 300 CAPSULE ORAL at 19:49
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
## 2022-05-09 NOTE — ED NOTES
Pt continues to sleep with easy respirations; 1:1 sitter continues; will continue to monitor     Delaney Hendrix RN  05/09/22 1930

## 2022-05-09 NOTE — LETTER
[address] [facility] [address]  Dept: [phone]      EMTALA TRANSFER CONSENT    NAME Christi Parsons                                         1970                              MRN [id number]    I have been informed of my rights regarding examination, treatment, and transfer   by Dr Johnson att  providers found    Benefits: Continuity of care    Risks: Potential for delay in receiving treatment      Consent for Transfer:  I acknowledge that my medical condition has been evaluated and explained to me by the emergency department physician or other qualified medical person and/or my attending physician, who has recommended that I be transferred to the service of  Accepting Physician: Gerber Montiel at [address], [address] : [facility]  The above potential benefits of such transfer, the potential risks associated with such transfer, and the probable risks of not being transferred have been explained to me, and I fully understand them  The doctor has explained that, in my case, the benefits of transfer outweigh the risks  I agree to be transferred  I authorize the performance of emergency medical procedures and treatments upon me in both transit and upon arrival at the receiving facility  Additionally, I authorize the release of any and all medical records to the receiving facility and request they be transported with me, if possible  I understand that the safest mode of transportation during a medical emergency is an ambulance and that the Hospital advocates the use of this mode of transport  Risks of traveling to the receiving facility by car, including absence of medical control, life sustaining equipment, such as oxygen, and medical personnel has been explained to me and I fully understand them  (OSEI CORRECT BOX BELOW)  [  ]  I consent to the stated transfer and to be transported by ambulance/helicopter    [  ]  I consent to the stated transfer, but refuse transportation by ambulance and accept full responsibility for my transportation by car  I understand the risks of non-ambulance transfers and I exonerate the Hospital and its staff from any deterioration in my condition that results from this refusal     X___________________________________________    DATE  05/10/22  TIME________  Signature of patient or legally responsible individual signing on patient behalf           RELATIONSHIP TO PATIENT_________________________          Provider Certification    NAME Shawn Medrano                                         1970                              MRN [id number]    A medical screening exam was performed on the above named patient  Based on the examination:    Condition Necessitating Transfer The encounter diagnosis was Suicidal ideation  Patient Condition: The patient has been stabilized such that within reasonable medical probability, no material deterioration of the patient condition or the condition of the unborn child(yeni) is likely to result from the transfer    Reason for Transfer: Level of Care needed not available at this facility    [facility]   · Space available and qualified personnel available for treatment as acknowledged by [employer] [phone]  · Agreed to accept transfer and to provide appropriate medical treatment as acknowledged by       Dr Ngo  · Appropriate medical records of the examination and treatment of the patient are provided at the time of transfer   [address] [facility] [address] _______  · Transfer will be performed by qualified personnel from [facility]  and appropriate transfer equipment as required, including the use of necessary and appropriate life support measures      Provider Certification: I have examined the patient and explained the following risks and benefits of being transferred/refusing transfer to the patient/family:  General risk, such as traffic hazards, adverse weather conditions, rough terrain or turbulence, possible failure of equipment (including vehicle or aircraft), or consequences of actions of persons outside the control of the transport personnel,The patient is stable for psychiatric transfer because they are medically stable, and is protected from harming him/herself or others during transport      Based on these reasonable risks and benefits to the patient and/or the unborn child(yeni), and based upon the information available at the time of the patients examination, I certify that the medical benefits reasonably to be expected from the provision of appropriate medical treatments at another medical facility outweigh the increasing risks, if any, to the individuals medical condition, and in the case of labor to the unborn child, from effecting the transfer      X____________________________________________ DATE 05/10/22        TIME_______      ORIGINAL - SEND TO MEDICAL RECORDS   COPY - SEND WITH PATIENT DURING TRANSFER

## 2022-05-09 NOTE — ED NOTES
Pt sleeping with easy respirations; easily arousable; 1:1 sitter continues with staff member; will continue to monitor     Austin Ordonez RN  05/09/22 6894

## 2022-05-09 NOTE — ED NOTES
Crisis met with pt to complete Crisis Intake and Safety Risk Assessment  Introduce self, role, and evaluation process  Pt presents in ED for a psychiatric evaluation  She is currently at [facility]  She endorses suicidal thoughts with a plan to over dose on her psychotropic medications  She denies any other acute psychiatric issues, and state  she is feeling somewhat anxious  Pt has a history of Schizoaffective Disorder, Anxiety  She sees a psychiatric with the [employer] [facility] [employer]  [phone]  Pt denies homicidal ideation, auditory, visual hallucinations or thoughts to self harm  Crisis and pt discussed treatment options  Pt agreed to to sign a 201 after rights and 72 hour notice were discussed and reviewed

## 2022-05-09 NOTE — ED NOTES
Pt sleeping with easy respirations; 1:1 sitter continues with staff member; will continue to monitor     Tianna Lozano RN  05/09/22 9292

## 2022-05-10 VITALS
WEIGHT: 169 LBS | SYSTOLIC BLOOD PRESSURE: 124 MMHG | TEMPERATURE: 97.5 F | BODY MASS INDEX: 33.18 KG/M2 | DIASTOLIC BLOOD PRESSURE: 68 MMHG | RESPIRATION RATE: 18 BRPM | HEIGHT: 60 IN | OXYGEN SATURATION: 95 % | HEART RATE: 78 BPM

## 2022-05-10 LAB
ALBUMIN SERPL BCP-MCNC: 4.2 G/DL (ref 3.5–5)
ALP SERPL-CCNC: 69 U/L (ref 34–104)
ALT SERPL W P-5'-P-CCNC: 11 U/L (ref 7–52)
ANION GAP SERPL CALCULATED.3IONS-SCNC: 6 MMOL/L (ref 4–13)
AST SERPL W P-5'-P-CCNC: 11 U/L (ref 13–39)
BASOPHILS # BLD AUTO: 0.03 THOUSANDS/ΜL (ref 0–0.1)
BASOPHILS NFR BLD AUTO: 0 % (ref 0–1)
BILIRUB SERPL-MCNC: 0.24 MG/DL (ref 0.2–1)
BUN SERPL-MCNC: 8 MG/DL (ref 5–25)
CALCIUM SERPL-MCNC: 9.2 MG/DL (ref 8.4–10.2)
CHLORIDE SERPL-SCNC: 103 MMOL/L (ref 96–108)
CO2 SERPL-SCNC: 30 MMOL/L (ref 21–32)
CREAT SERPL-MCNC: 0.72 MG/DL (ref 0.6–1.3)
EOSINOPHIL # BLD AUTO: 0.37 THOUSAND/ΜL (ref 0–0.61)
EOSINOPHIL NFR BLD AUTO: 3 % (ref 0–6)
ERYTHROCYTE [DISTWIDTH] IN BLOOD BY AUTOMATED COUNT: 14.6 % (ref 11.6–15.1)
EXT PREG TEST URINE: NEGATIVE
EXT. CONTROL ED NAV: NORMAL
GFR SERPL CREATININE-BSD FRML MDRD: 97 ML/MIN/1.73SQ M
GLUCOSE SERPL-MCNC: 85 MG/DL (ref 65–140)
HCT VFR BLD AUTO: 40.7 % (ref 34.8–46.1)
HGB BLD-MCNC: 12.5 G/DL (ref 11.5–15.4)
IMM GRANULOCYTES # BLD AUTO: 0.09 THOUSAND/UL (ref 0–0.2)
IMM GRANULOCYTES NFR BLD AUTO: 1 % (ref 0–2)
LYMPHOCYTES # BLD AUTO: 1.87 THOUSANDS/ΜL (ref 0.6–4.47)
LYMPHOCYTES NFR BLD AUTO: 14 % (ref 14–44)
MCH RBC QN AUTO: 29.2 PG (ref 26.8–34.3)
MCHC RBC AUTO-ENTMCNC: 30.7 G/DL (ref 31.4–37.4)
MCV RBC AUTO: 95 FL (ref 82–98)
MONOCYTES # BLD AUTO: 0.7 THOUSAND/ΜL (ref 0.17–1.22)
MONOCYTES NFR BLD AUTO: 5 % (ref 4–12)
NEUTROPHILS # BLD AUTO: 10.06 THOUSANDS/ΜL (ref 1.85–7.62)
NEUTS SEG NFR BLD AUTO: 77 % (ref 43–75)
NRBC BLD AUTO-RTO: 0 /100 WBCS
PLATELET # BLD AUTO: 277 THOUSANDS/UL (ref 149–390)
PMV BLD AUTO: 9.4 FL (ref 8.9–12.7)
POTASSIUM SERPL-SCNC: 4 MMOL/L (ref 3.5–5.3)
PROT SERPL-MCNC: 7 G/DL (ref 6.4–8.4)
RBC # BLD AUTO: 4.28 MILLION/UL (ref 3.81–5.12)
SODIUM SERPL-SCNC: 139 MMOL/L (ref 135–147)
WBC # BLD AUTO: 13.12 THOUSAND/UL (ref 4.31–10.16)

## 2022-05-10 PROCEDURE — 80053 COMPREHEN METABOLIC PANEL: CPT | Performed by: EMERGENCY MEDICINE

## 2022-05-10 PROCEDURE — 81025 URINE PREGNANCY TEST: CPT | Performed by: EMERGENCY MEDICINE

## 2022-05-10 PROCEDURE — 85025 COMPLETE CBC W/AUTO DIFF WBC: CPT | Performed by: EMERGENCY MEDICINE

## 2022-05-10 PROCEDURE — 36415 COLL VENOUS BLD VENIPUNCTURE: CPT | Performed by: EMERGENCY MEDICINE

## 2022-05-10 RX ADMIN — LORAZEPAM 0.5 MG: 0.5 TABLET ORAL at 08:30

## 2022-05-10 RX ADMIN — GABAPENTIN 600 MG: 300 CAPSULE ORAL at 12:20

## 2022-05-10 RX ADMIN — PANTOPRAZOLE SODIUM 40 MG: 40 TABLET, DELAYED RELEASE ORAL at 08:25

## 2022-05-10 RX ADMIN — ESCITALOPRAM OXALATE 20 MG: 10 TABLET ORAL at 08:25

## 2022-05-10 RX ADMIN — LORAZEPAM 0.5 MG: 0.5 TABLET ORAL at 17:00

## 2022-05-10 RX ADMIN — B-COMPLEX W/ C & FOLIC ACID TAB 1 TABLET: TAB at 08:25

## 2022-05-10 RX ADMIN — CLOZAPINE 100 MG: 100 TABLET ORAL at 18:46

## 2022-05-10 RX ADMIN — GABAPENTIN 600 MG: 300 CAPSULE ORAL at 18:46

## 2022-05-10 RX ADMIN — CLOZAPINE 100 MG: 100 TABLET ORAL at 08:25

## 2022-05-10 RX ADMIN — BENZTROPINE MESYLATE 1 MG: 0.5 TABLET ORAL at 08:25

## 2022-05-10 RX ADMIN — GABAPENTIN 600 MG: 300 CAPSULE ORAL at 08:25

## 2022-05-10 RX ADMIN — BENZTROPINE MESYLATE 1 MG: 0.5 TABLET ORAL at 18:46

## 2022-05-10 NOTE — ED NOTES
Received a call from [address] at [facility]  Patient is denied  She stated they do not accept patients who need/could need nebulizer treatments

## 2022-05-10 NOTE — ED NOTES
Pt due for [facility] Suicide Assessment due at this time however pt is sleeping; will continue to monitor pt; 1:1 continues with staff member     Reagan Melton RN  05/10/22 0004

## 2022-05-10 NOTE — ED NOTES
Transportation is arranged with [employer] is scheduled for 8pm  Patient may go to the floor after 5 pm

## 2022-05-10 NOTE — ED NOTES
Patient is accepted at [facility]  Patient is accepted by Dr Marisela Hernandez         Patient may go to the floor at [facility]

## 2022-05-10 NOTE — ED NOTES
Faxed referral to [place], [place] and [place]  No bed available at: [facility] per [facility] per [employer] per Clementina Kapoor per [facility] per [place]  No Intake this shift  Network availability unknown

## 2022-05-10 NOTE — ED NOTES
Pt due [facility] Su[facility] Assessment at this time however pt is sleeping; determined best to allow pt to sleep and will assess pt when awake; asleep with easy respirations; 1:1 sitter continues with staff member; will continue to monitor     Abigail Ellis RN  05/10/22 [facility]  05/10/22 [address] [place] [address]

## 2022-05-10 NOTE — ED NOTES
Peggy Farah  as per Jozef Anaya fax clinicals  Mohamud Wesley as per Olga Rodriguez fax clinicals  Melquiades Umana as per Patricio Cardenas has female beds  [facility] as per Ed Willams no beds  [place] as per Skylar Marroquin no beds  Rishi Gamboa as per [employer] as per [facility] no beds  [facility] [employer] left voicemail  [place] as per [employer] fax clinicals  [facility] no beds as per [facility] as per [employer] no beds   [place] as per [facility] as per [facility] no beds

## 2022-05-10 NOTE — ED NOTES
Insurance Authorization for admission:   Phone call placed to [facility]  Phone number: [phone]    Spoke to  [employer]    14 days approved  Level of care: 201/IP  Review on  5/23/2022  Authorization # [id number]    EVS (Eligibility Verification System) called - [phone]    Automated system indicates: eligible in [employer] for Transportation:    Not needed for higher level of care

## 2022-05-10 NOTE — ED CARE HANDOFF
Emergency Department Sign Out Note        Sign out and transfer of care from Dr Jenny Chisholm  See Separate Emergency Department note  The patient, Prabhakar Dias, was evaluated by the previous provider for SI  Workup Completed:  Medical clearance, crisis eval, signed 61 51 81,  will 302 if needed  ED Course / Workup Pending (followup): Bed placement  Procedures  MDM        Disposition  Final diagnoses:   Suicidal ideation     Time reflects when diagnosis was documented in both MDM as applicable and the Disposition within this note     Time User Action Codes Description Comment    5/9/2022 12:58 PM Jaclynn Denver Add [O39 298] Suicidal ideation       ED Disposition     ED Disposition Condition Date/Time Comment    Transfer to [facility] May 9, 2022 12:58 PM Prabhakar Dias should be transferred out to [facility] and has been medically cleared  Follow-up Information    None       Patient's Medications   Discharge Prescriptions    No medications on file     No discharge procedures on file         ED Provider  Electronically Signed by     Brissa Almendarez DO  05/10/22 5502

## 2022-05-10 NOTE — ED CARE HANDOFF
Emergency Department Sign Out Note        Sign out and transfer of care from Dr Lissette Woodruff  See Separate Emergency Department note  The patient, Keven Carl, was evaluated by the previous provider for SI, came in on 302, signed 201  Workup Completed:  uds  Covid,  ETOH    ED Course / Workup Pending (followup):                                      ED Course as of 05/10/22 0428   Tue May 10, 2022   0127 Received in sign out:     Pt was 302 for SI, signed 201     Medically cleared: uds, covid, ETOH done    No issues during last shift    Bed search in progress       Procedures  MDM        Disposition  Final diagnoses:   Suicidal ideation     Time reflects when diagnosis was documented in both MDM as applicable and the Disposition within this note     Time User Action Codes Description Comment    5/9/2022 12:58 PM Walter El Add [I51 [id number]] Suicidal ideation       ED Disposition     ED Disposition Condition Date/Time Comment    Transfer to [facility] May 9, 2022 12:58 PM Keven Carl should be transferred out to [facility] and has been medically cleared  Follow-up Information    None       Patient's Medications   Discharge Prescriptions    No medications on file     No discharge procedures on file         ED Provider  Electronically Signed by     Levar Cheng MD  05/10/22 5752

## 2022-05-10 NOTE — ED NOTES
Received a call from Nicole De Santiago at [facility]  Patient is denied at this time as she is deemed "way too much" for the mood disorder bed that is available at this time

## 2022-05-24 ENCOUNTER — APPOINTMENT (OUTPATIENT)
Dept: LAB | Facility: CLINIC | Age: 52
End: 2022-05-24
Payer: COMMERCIAL

## 2022-05-27 ENCOUNTER — TELEPHONE (OUTPATIENT)
Dept: PAIN MEDICINE | Facility: CLINIC | Age: 52
End: 2022-05-27

## 2022-05-27 NOTE — TELEPHONE ENCOUNTER
Patient says tizanidine is not helping her back pain, she is asking for flexiril from [address]   Phone: [phone]

## 2022-05-27 NOTE — TELEPHONE ENCOUNTER
Pt wants to switch tizanidine to flexeril which has worked in the past  Not seen since 10/2021   Pt did schedule a f/u  Please advise on med request

## 2022-05-27 NOTE — TELEPHONE ENCOUNTER
Lm for pt to cb  Pt needs to schedule a f/u ov when calling back with [facility] or Jason Martinez  Last ov 10/13/21    Has pt tried flexeril before?

## 2022-05-27 NOTE — TELEPHONE ENCOUNTER
Lm for pt to cb  Pt needs to schedule a f/u ov when calling back with [facility] or Marian Soulier  Last ov 10/13/21

## 2022-05-31 ENCOUNTER — APPOINTMENT (OUTPATIENT)
Dept: LAB | Facility: CLINIC | Age: 52
End: 2022-05-31
Payer: COMMERCIAL

## 2022-06-06 DIAGNOSIS — M25.541 JOINT PAIN IN BOTH HANDS: ICD-10-CM

## 2022-06-06 DIAGNOSIS — M25.542 JOINT PAIN IN BOTH HANDS: ICD-10-CM

## 2022-06-07 RX ORDER — GLUCOSAM/CHON-MSM1/C/MANG/BOSW 750-644 MG
TABLET ORAL
Qty: 56 TABLET | Refills: 4 | Status: SHIPPED | OUTPATIENT
Start: 2022-06-07 | End: 2022-07-22 | Stop reason: ALTCHOICE

## 2022-06-09 ENCOUNTER — APPOINTMENT (OUTPATIENT)
Dept: LAB | Facility: CLINIC | Age: 52
End: 2022-06-09
Payer: COMMERCIAL

## 2022-06-13 DIAGNOSIS — R52 BODY ACHES: ICD-10-CM

## 2022-06-13 RX ORDER — ACETAMINOPHEN 325 MG/1
TABLET ORAL
Qty: 30 TABLET | Refills: 4 | Status: SHIPPED | OUTPATIENT
Start: 2022-06-13 | End: 2022-07-22 | Stop reason: ALTCHOICE

## 2022-06-14 ENCOUNTER — APPOINTMENT (OUTPATIENT)
Dept: LAB | Facility: CLINIC | Age: 52
End: 2022-06-14
Payer: COMMERCIAL

## 2022-07-05 ENCOUNTER — APPOINTMENT (OUTPATIENT)
Dept: LAB | Facility: CLINIC | Age: 52
End: 2022-07-05
Payer: COMMERCIAL

## 2022-07-05 DIAGNOSIS — Z79.899 ENCOUNTER FOR LONG-TERM (CURRENT) USE OF OTHER MEDICATIONS: ICD-10-CM

## 2022-07-05 LAB
BASOPHILS # BLD AUTO: 0.03 THOUSANDS/ΜL (ref 0–0.1)
BASOPHILS NFR BLD AUTO: 0 % (ref 0–1)
EOSINOPHIL # BLD AUTO: 0.37 THOUSAND/ΜL (ref 0–0.61)
EOSINOPHIL NFR BLD AUTO: 4 % (ref 0–6)
ERYTHROCYTE [DISTWIDTH] IN BLOOD BY AUTOMATED COUNT: 14.7 % (ref 11.6–15.1)
HCT VFR BLD AUTO: 41.7 % (ref 34.8–46.1)
HGB BLD-MCNC: 12.9 G/DL (ref 11.5–15.4)
IMM GRANULOCYTES # BLD AUTO: 0.15 THOUSAND/UL (ref 0–0.2)
IMM GRANULOCYTES NFR BLD AUTO: 2 % (ref 0–2)
LYMPHOCYTES # BLD AUTO: 1.8 THOUSANDS/ΜL (ref 0.6–4.47)
LYMPHOCYTES NFR BLD AUTO: 18 % (ref 14–44)
MCH RBC QN AUTO: 27.8 PG (ref 26.8–34.3)
MCHC RBC AUTO-ENTMCNC: 30.9 G/DL (ref 31.4–37.4)
MCV RBC AUTO: 90 FL (ref 82–98)
MONOCYTES # BLD AUTO: 0.52 THOUSAND/ΜL (ref 0.17–1.22)
MONOCYTES NFR BLD AUTO: 5 % (ref 4–12)
NEUTROPHILS # BLD AUTO: 7.4 THOUSANDS/ΜL (ref 1.85–7.62)
NEUTS SEG NFR BLD AUTO: 71 % (ref 43–75)
NRBC BLD AUTO-RTO: 0 /100 WBCS
PLATELET # BLD AUTO: 282 THOUSANDS/UL (ref 149–390)
PMV BLD AUTO: 10.4 FL (ref 8.9–12.7)
RBC # BLD AUTO: 4.64 MILLION/UL (ref 3.81–5.12)
WBC # BLD AUTO: 10.27 THOUSAND/UL (ref 4.31–10.16)

## 2022-07-05 PROCEDURE — 36415 COLL VENOUS BLD VENIPUNCTURE: CPT

## 2022-07-05 PROCEDURE — 85025 COMPLETE CBC W/AUTO DIFF WBC: CPT

## 2022-07-18 ENCOUNTER — OFFICE VISIT (OUTPATIENT)
Dept: NEUROLOGY | Facility: CLINIC | Age: 52
End: 2022-07-18
Payer: COMMERCIAL

## 2022-07-18 ENCOUNTER — APPOINTMENT (OUTPATIENT)
Dept: LAB | Facility: CLINIC | Age: 52
End: 2022-07-18
Payer: COMMERCIAL

## 2022-07-18 VITALS
SYSTOLIC BLOOD PRESSURE: 122 MMHG | BODY MASS INDEX: 32.22 KG/M2 | WEIGHT: 165 LBS | DIASTOLIC BLOOD PRESSURE: 70 MMHG | HEART RATE: 68 BPM | OXYGEN SATURATION: 99 %

## 2022-07-18 DIAGNOSIS — Z79.899 ENCOUNTER FOR LONG-TERM (CURRENT) USE OF OTHER MEDICATIONS: ICD-10-CM

## 2022-07-18 DIAGNOSIS — R41.3 MEMORY DIFFICULTY: ICD-10-CM

## 2022-07-18 DIAGNOSIS — F25.0 SCHIZOAFFECTIVE DISORDER, BIPOLAR TYPE (HCC): Chronic | ICD-10-CM

## 2022-07-18 DIAGNOSIS — G44.89 OTHER HEADACHE SYNDROME: ICD-10-CM

## 2022-07-18 DIAGNOSIS — Z72.0 TOBACCO ABUSE: ICD-10-CM

## 2022-07-18 DIAGNOSIS — G44.89 OTHER HEADACHE SYNDROME: Primary | ICD-10-CM

## 2022-07-18 LAB
BASOPHILS # BLD AUTO: 0.03 THOUSANDS/ΜL (ref 0–0.1)
BASOPHILS NFR BLD AUTO: 0 % (ref 0–1)
CRP SERPL QL: 16.8 MG/L
EOSINOPHIL # BLD AUTO: 0.24 THOUSAND/ΜL (ref 0–0.61)
EOSINOPHIL NFR BLD AUTO: 3 % (ref 0–6)
ERYTHROCYTE [DISTWIDTH] IN BLOOD BY AUTOMATED COUNT: 15 % (ref 11.6–15.1)
ERYTHROCYTE [SEDIMENTATION RATE] IN BLOOD: 42 MM/HOUR (ref 0–29)
HCT VFR BLD AUTO: 44.3 % (ref 34.8–46.1)
HGB BLD-MCNC: 13.8 G/DL (ref 11.5–15.4)
IMM GRANULOCYTES # BLD AUTO: 0.05 THOUSAND/UL (ref 0–0.2)
IMM GRANULOCYTES NFR BLD AUTO: 1 % (ref 0–2)
LYMPHOCYTES # BLD AUTO: 1.5 THOUSANDS/ΜL (ref 0.6–4.47)
LYMPHOCYTES NFR BLD AUTO: 16 % (ref 14–44)
MCH RBC QN AUTO: 28.5 PG (ref 26.8–34.3)
MCHC RBC AUTO-ENTMCNC: 31.2 G/DL (ref 31.4–37.4)
MCV RBC AUTO: 91 FL (ref 82–98)
MONOCYTES # BLD AUTO: 0.52 THOUSAND/ΜL (ref 0.17–1.22)
MONOCYTES NFR BLD AUTO: 5 % (ref 4–12)
NEUTROPHILS # BLD AUTO: 7.31 THOUSANDS/ΜL (ref 1.85–7.62)
NEUTS SEG NFR BLD AUTO: 75 % (ref 43–75)
NRBC BLD AUTO-RTO: 0 /100 WBCS
PLATELET # BLD AUTO: 294 THOUSANDS/UL (ref 149–390)
PMV BLD AUTO: 10.8 FL (ref 8.9–12.7)
RBC # BLD AUTO: 4.85 MILLION/UL (ref 3.81–5.12)
WBC # BLD AUTO: 9.65 THOUSAND/UL (ref 4.31–10.16)

## 2022-07-18 PROCEDURE — 85652 RBC SED RATE AUTOMATED: CPT

## 2022-07-18 PROCEDURE — 36415 COLL VENOUS BLD VENIPUNCTURE: CPT

## 2022-07-18 PROCEDURE — 86140 C-REACTIVE PROTEIN: CPT

## 2022-07-18 PROCEDURE — 85025 COMPLETE CBC W/AUTO DIFF WBC: CPT

## 2022-07-18 PROCEDURE — 99215 OFFICE O/P EST HI 40 MIN: CPT | Performed by: PSYCHIATRY & NEUROLOGY
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
## 2022-07-22 ENCOUNTER — OFFICE VISIT (OUTPATIENT)
Dept: INTERNAL MEDICINE CLINIC | Facility: CLINIC | Age: 52
End: 2022-07-22
Payer: COMMERCIAL

## 2022-07-22 VITALS
DIASTOLIC BLOOD PRESSURE: 76 MMHG | SYSTOLIC BLOOD PRESSURE: 116 MMHG | RESPIRATION RATE: 20 BRPM | BODY MASS INDEX: 32 KG/M2 | WEIGHT: 163 LBS | HEART RATE: 108 BPM | HEIGHT: 60 IN | TEMPERATURE: 97.3 F

## 2022-07-22 DIAGNOSIS — R73.03 PREDIABETES: ICD-10-CM

## 2022-07-22 DIAGNOSIS — Z11.1 SCREENING-PULMONARY TB: ICD-10-CM

## 2022-07-22 DIAGNOSIS — Z00.00 ANNUAL PHYSICAL EXAM: ICD-10-CM

## 2022-07-22 DIAGNOSIS — K59.01 SLOW TRANSIT CONSTIPATION: Primary | ICD-10-CM

## 2022-07-22 DIAGNOSIS — K59.09 OTHER CONSTIPATION: ICD-10-CM

## 2022-07-22 DIAGNOSIS — E78.2 MIXED HYPERLIPIDEMIA: ICD-10-CM

## 2022-07-22 DIAGNOSIS — J02.9 SORE THROAT: ICD-10-CM

## 2022-07-22 PROBLEM — F17.210 DEPENDENCE ON NICOTINE FROM CIGARETTES: Status: ACTIVE | Noted: 2022-06-17

## 2022-07-22 PROBLEM — F10.21 HISTORY OF ALCOHOL DEPENDENCE (HCC): Status: ACTIVE | Noted: 2022-06-16

## 2022-07-22 PROBLEM — J43.9 EMPHYSEMA LUNG (HCC): Status: ACTIVE | Noted: 2022-06-17

## 2022-07-22 PROCEDURE — 99214 OFFICE O/P EST MOD 30 MIN: CPT | Performed by: NURSE PRACTITIONER

## 2022-07-22 RX ORDER — ACETAMINOPHEN 500 MG
1000 TABLET ORAL EVERY 6 HOURS PRN
Qty: 60 TABLET | Refills: 0 | Status: SHIPPED | OUTPATIENT
Start: 2022-07-22 | End: 2022-08-11

## 2022-07-22 RX ORDER — DOCUSATE SODIUM 100 MG/1
100 CAPSULE, LIQUID FILLED ORAL 2 TIMES DAILY
Qty: 90 CAPSULE | Refills: 0 | Status: SHIPPED | OUTPATIENT
Start: 2022-07-22 | End: 2022-08-11

## 2022-07-22 NOTE — PATIENT INSTRUCTIONS
Pharyngitis   AMBULATORY CARE:   Pharyngitis , or sore throat, is inflammation of the tissues and structures in your pharynx (throat)  Pharyngitis is most often caused by bacteria  It may also be caused by a cold or flu virus  Other causes include smoking, allergies, or acid reflux  Signs and symptoms that may occur with pharyngitis:   Sore throat or pain when you swallow    Fever, chills, and body aches    Hoarse or raspy voice    Cough, runny or stuffy nose, itchy or watery eyes    Headache    Upset stomach and loss of appetite    Mild neck stiffness    Swollen glands that feel like hard lumps when you touch your neck    White and yellow pus-filled blisters in the back of your throat    Call 911 for any of the following: You have trouble breathing or swallowing because your throat is swollen or sore  Seek care immediately if:   You are drooling because it hurts too much to swallow  Your fever is higher than 102? F (39?C) or lasts longer than 3 days  You are confused  You taste blood in your throat  Contact your healthcare provider if:   Your throat pain gets worse  You have a painful lump in your throat that does not go away after 5 days  Your symptoms do not improve after 5 days  You have questions or concerns about your condition or care  Treatment for pharyngitis:  Viral pharyngitis will go away on its own without treatment  Your sore throat should start to feel better in 3 to 5 days for both viral and bacterial infections  You may need any of the following:  Antibiotics  treat a bacterial infection  NSAIDs , such as ibuprofen, help decrease swelling, pain, and fever  NSAIDs can cause stomach bleeding or kidney problems in certain people  If you take blood thinner medicine, always ask your healthcare provider if NSAIDs are safe for you  Always read the medicine label and follow directions  Acetaminophen  decreases pain and fever   It is available without a doctor's order  Ask how much to take and how often to take it  Follow directions  Acetaminophen can cause liver damage if not taken correctly  Manage your symptoms:   Gargle salt water  Mix ¼ teaspoon salt in an 8 ounce glass of warm water and gargle  This may help decrease swelling in your throat  Drink liquids as directed  You may need to drink more liquids than usual  Liquids may help soothe your throat and prevent dehydration  Ask how much liquid to drink each day and which liquids are best for you  Use a cool-steam humidifier  to help moisten the air in your room and calm your cough  Soothe your throat  with cough drops, ice, soft foods, or popsicles  Prevent the spread of pharyngitis:  Cover your mouth and nose when you cough or sneeze  Do not share food or drinks  Wash your hands often  Use soap and water  If soap and water are unavailable, use an alcohol based hand   Follow up with your doctor as directed:  Write down your questions so you remember to ask them during your visits  © Copyright [employer] 2022 Information is for End User's use only and may not be sold, redistributed or otherwise used for commercial purposes  All illustrations and images included in CareNotes® are the copyrighted property of [employer]  or [address] [place] [address]  The above information is an  only  It is not intended as medical advice for individual conditions or treatments  Talk to your doctor, nurse or pharmacist before following any medical regimen to see if it is safe and effective for you  High Fiber Diet   AMBULATORY CARE:   A high-fiber diet  includes foods that have a high amount of fiber  Fiber is the part of fruits, vegetables, and grains that is not broken down by your body  Fiber keeps your bowel movements regular  Fiber can also help lower your cholesterol level, control blood sugar in people with diabetes, and relieve constipation   Fiber can also help you control your weight because it helps you feel full faster  Most adults should eat 25 to 35 grams of fiber each day  Talk to your dietitian or healthcare provider about the amount of fiber you need  Good sources of fiber:       Foods with at least 4 grams of fiber per serving:      ? to ½ cup of high-fiber cereal (check the nutrition label on the box)    ½ cup of blackberries or raspberries    4 dried prunes    1 cooked artichoke    ½ cup of cooked legumes, such as lentils, or red, kidney, and chavez beans    Foods with 1 to 3 grams of fiber per servin slice of whole-wheat, pumpernickel, or rye bread    ½ cup of cooked brown rice    4 whole-wheat crackers    1 cup of oatmeal    ½ cup of cereal with 1 to 3 grams of fiber per serving (check the nutrition label on the box)    1 small piece of fruit, such as an apple, banana, pear, kiwi, or orange    3 dates    ½ cup of canned apricots, fruit cocktail, peaches, or pears    ½ cup of raw or cooked vegetables, such as carrots, cauliflower, cabbage, spinach, squash, or corn    Ways that you can increase fiber in your diet:   Choose brown or wild rice instead of white rice  Use whole wheat flour in recipes instead of white or all-purpose flour  Add beans and peas to casseroles or soups  Choose fresh fruit and vegetables with peels or skins on instead of juices  Other diet guidelines to follow: Add fiber to your diet slowly  You may have abdominal discomfort, bloating, and gas if you add fiber to your diet too quickly  Drink plenty of liquids as you add fiber to your diet  You may have nausea or develop constipation if you do not drink enough water  Ask how much liquid to drink each day and which liquids are best for you  © Copyright "[employer]"  Information is for End User's use only and may not be sold, redistributed or otherwise used for commercial purposes   All illustrations and images included in CareNotes® are the copyrighted property of [employer]  or [address] [facility]  The above information is an  only  It is not intended as medical advice for individual conditions or treatments  Talk to your doctor, nurse or pharmacist before following any medical regimen to see if it is safe and effective for you  Constipation   WHAT YOU NEED TO KNOW:   Constipation means you have hard, dry bowel movements, or you go longer than usual between bowel movements  DISCHARGE INSTRUCTIONS:   Call your doctor if:   You have blood in your bowel movements  You have a fever and abdominal pain with the constipation  Your constipation gets worse  You start to vomit  You have questions or concerns about your condition or care  Medicines:   Medicine  such as a laxative may help relax and loosen your intestines to help you have a bowel movement  Your provider may recommend you only use laxatives for a short time  Long-term use may make your bowels dependent on the medicine  Take your medicine as directed  Contact your healthcare provider if you think your medicine is not helping or if you have side effects  Tell him of her if you are allergic to any medicine  Keep a list of the medicines, vitamins, and herbs you take  Include the amounts, and when and why you take them  Bring the list or the pill bottles to follow-up visits  Carry your medicine list with you in case of an emergency  Relieve constipation:   A suppository  may be used to help soften your bowel movements  This may make them easier to pass  A suppository is guided into your rectum through your anus  An enema  is liquid medicine used to clear bowel movement from your rectum  The medicine is put into your rectum through your anus  Prevent constipation:   Drink liquids as directed  You may need to drink extra liquids to help soften and move your bowels  Ask how much liquid to drink each day and which liquids are best for you  Eat high-fiber foods    This may help decrease constipation by adding bulk to your bowel movements  High-fiber foods include fruit, vegetables, whole-grain breads and cereals, and beans  Your healthcare provider or dietitian can help you create a high-fiber meal plan  Your provider may also recommend a fiber supplement if you cannot get enough fiber from food  Exercise regularly  Regular physical activity can help stimulate your intestines  Walking is a good exercise to prevent or relieve constipation  Ask which exercises are best for you  Schedule a time each day to have a bowel movement  This may help train your body to have regular bowel movements  Bend forward while you are on the toilet to help move the bowel movement out  Sit on the toilet for at least 10 minutes, even if you do not have a bowel movement  Talk to your healthcare provider about your medicines  Certain medicines, such as opioids, can cause constipation  Your provider may be able to make medicine changes  For example, he or she may change the kind of medicine, or change when you take it  Follow up with your doctor as directed:  Write down your questions so you remember to ask them during your visits  © Copyright [employer] 2022 Information is for End User's use only and may not be sold, redistributed or otherwise used for commercial purposes  All illustrations and images included in CareNotes® are the copyrighted property of [employer] Inc  or [facility] Melony Awan   The above information is an  only  It is not intended as medical advice for individual conditions or treatments  Talk to your doctor, nurse or pharmacist before following any medical regimen to see if it is safe and effective for you  Wellness Visit for Adults   AMBULATORY CARE:   A wellness visit  is when you see your healthcare provider to get screened for health problems  Your healthcare provider will also give you advice on how to stay healthy   Write down your questions so you remember to ask them  Ask your healthcare provider how often you should have a wellness visit  What happens at a wellness visit:  Your healthcare provider will ask about your health, and your family history of health problems  This includes high blood pressure, heart disease, and cancer  He or she will ask if you have symptoms that concern you, if you smoke, and about your mood  You may also be asked about your intake of medicines, supplements, food, and alcohol  Any of the following may be done: Your weight  will be checked  Your height may also be checked so your body mass index (BMI) can be calculated  Your BMI shows if you are at a healthy weight  Your blood pressure  and heart rate will be checked  Your temperature may also be checked  Blood and urine tests  may be done  Blood tests may be done to check your cholesterol levels  Abnormal cholesterol levels increase your risk for heart disease and stroke  You may also need a blood or urine test to check for diabetes if you are at increased risk  Urine tests may be done to look for signs of an infection or kidney disease  A physical exam  includes checking your heartbeat and lungs with a stethoscope  Your healthcare provider may also check your skin to look for sun damage  Screening tests  may be recommended  A screening test is done to check for diseases that may not cause symptoms  The screening tests you may need depend on your age, gender, family history, and lifestyle habits  For example, colorectal screening may be recommended if you are 48years old or older  Screening tests you need if you are a woman:   A Pap smear  is used to screen for cervical cancer  Pap smears are usually done every 3 to 5 years depending on your age  You may need them more often if you have had abnormal Pap smear test results in the past  Ask your healthcare provider how often you should have a Pap smear      A mammogram  is an x-ray of your breasts to screen for breast cancer  Experts recommend mammograms every 2 years starting at age 48 years  You may need a mammogram at age 52 years or younger if you have an increased risk for breast cancer  Talk to your healthcare provider about when you should start having mammograms and how often you need them  Vaccines you may need:   Get an influenza vaccine  every year  The influenza vaccine protects you from the flu  Several types of viruses cause the flu  The viruses change over time, so new vaccines are made each year  Get a tetanus-diphtheria (Td) booster vaccine  every 10 years  This vaccine protects you against tetanus and diphtheria  Tetanus is a severe infection that may cause painful muscle spasms and lockjaw  Diphtheria is a severe bacterial infection that causes a thick covering in the back of your mouth and throat  Get a human papillomavirus (HPV) vaccine  if you are female and aged 23 to 32 or male 23 to 24 and never received it  This vaccine protects you from HPV infection  HPV is the most common infection spread by sexual contact  HPV may also cause vaginal, penile, and anal cancers  Get a pneumococcal vaccine  if you are aged 72 years or older  The pneumococcal vaccine is an injection given to protect you from pneumococcal disease  Pneumococcal disease is an infection caused by pneumococcal bacteria  The infection may cause pneumonia, meningitis, or an ear infection  Get a shingles vaccine  if you are 60 or older, even if you have had shingles before  The shingles vaccine is an injection to protect you from the varicella-zoster virus  This is the same virus that causes chickenpox  Shingles is a painful rash that develops in people who had chickenpox or have been exposed to the virus  How to eat healthy:  My Plate is a model for planning healthy meals  It shows the types and amounts of foods that should go on your plate   Fruits and vegetables make up about half of your plate, and grains and protein make up the other half  A serving of dairy is included on the side of your plate  The amount of calories and serving sizes you need depends on your age, gender, weight, and height  Examples of healthy foods are listed below:  Eat a variety of vegetables  such as dark green, red, and orange vegetables  You can also include canned vegetables low in sodium (salt) and frozen vegetables without added butter or sauces  Eat a variety of fresh fruits , canned fruit in 100% juice, frozen fruit, and dried fruit  Include whole grains  At least half of the grains you eat should be whole grains  Examples include whole-wheat bread, wheat pasta, brown rice, and whole-grain cereals such as oatmeal     Eat a variety of protein foods such as seafood (fish and shellfish), lean meat, and poultry without skin (turkey and chicken)  Examples of lean meats include pork leg, shoulder, or tenderloin, and beef round, sirloin, tenderloin, and extra lean ground beef  Other protein foods include eggs and egg substitutes, beans, peas, soy products, nuts, and seeds  Choose low-fat dairy products such as skim or 1% milk or low-fat yogurt, cheese, and cottage cheese  Limit unhealthy fats  such as butter, hard margarine, and shortening  Exercise:  Exercise at least 30 minutes per day on most days of the week  Some examples of exercise include walking, biking, dancing, and swimming  You can also fit in more physical activity by taking the stairs instead of the elevator or parking farther away from stores  Include muscle strengthening activities 2 days each week  Regular exercise provides many health benefits  It helps you manage your weight, and decreases your risk for type 2 diabetes, heart disease, stroke, and high blood pressure  Exercise can also help improve your mood  Ask your healthcare provider about the best exercise plan for you  General health and safety guidelines:   Do not smoke    Nicotine and other chemicals in cigarettes and cigars can cause lung damage  Ask your healthcare provider for information if you currently smoke and need help to quit  E-cigarettes or smokeless tobacco still contain nicotine  Talk to your healthcare provider before you use these products  Limit alcohol  A drink of alcohol is 12 ounces of beer, 5 ounces of wine, or 1½ ounces of liquor  Lose weight, if needed  Being overweight increases your risk of certain health conditions  These include heart disease, high blood pressure, type 2 diabetes, and certain types of cancer  Protect your skin  Do not sunbathe or use tanning beds  Use sunscreen with a SPF 15 or higher  Apply sunscreen at least 15 minutes before you go outside  Reapply sunscreen every 2 hours  Wear protective clothing, hats, and sunglasses when you are outside  Drive safely  Always wear your seatbelt  Make sure everyone in your car wears a seatbelt  A seatbelt can save your life if you are in an accident  Do not use your cell phone when you are driving  This could distract you and cause an accident  Pull over if you need to make a call or send a text message  Practice safe sex  Use latex condoms if are sexually active and have more than one partner  Your healthcare provider may recommend screening tests for sexually transmitted infections (STIs)  Wear helmets, lifejackets, and protective gear  Always wear a helmet when you ride a bike or motorcycle, go skiing, or play sports that could cause a head injury  Wear protective equipment when you play sports  Wear a lifejacket when you are on a boat or doing water sports  © Copyright [employer] 2022 Information is for End User's use only and may not be sold, redistributed or otherwise used for commercial purposes  All illustrations and images included in CareNotes® are the copyrighted property of [employer]  or Daryl Laguerre  The above information is an  only   It is not intended as medical advice for individual conditions or treatments  Talk to your doctor, nurse or pharmacist before following any medical regimen to see if it is safe and effective for you

## 2022-07-22 NOTE — ASSESSMENT & PLAN NOTE
Chronic in nature  Patient is due to follow-up with gastroenterology in this has been recommended  The constipation is probably due to her psychiatric medications  Stool softeners recommended twice a day    High-fiber diet, increased fluid intake and exercise recommended

## 2022-07-22 NOTE — ASSESSMENT & PLAN NOTE
The patient with a several week history of a sore throat and cough  She continues to smoke  She does of underlying lung disease  She does follow with pulmonology  Upon exam her throat does not appear to be erythematous  She has no exudate or enlarged lymph nodes  I did encourage the patient to gargle with salt water and take Tylenol for the discomfort  Throat lozenges can be used as well  She has been afebrile

## 2022-07-22 NOTE — PROGRESS NOTES
Frank    NAME: Aleshia Galindo  AGE: 47 y o  SEX: female  : 1970     DATE: 2022     Assessment and Plan:     Problem List Items Addressed This Visit        Digestive    Slow transit constipation - Primary       Chronic in nature  Patient is due to follow-up with gastroenterology in this has been recommended  The constipation is probably due to her psychiatric medications  Stool softeners recommended twice a day  High-fiber diet, increased fluid intake and exercise recommended            Other    Sore throat       The patient with a several week history of a sore throat and cough  She continues to smoke  She does of underlying lung disease  She does follow with pulmonology  Upon exam her throat does not appear to be erythematous  She has no exudate or enlarged lymph nodes  I did encourage the patient to gargle with salt water and take Tylenol for the discomfort  Throat lozenges can be used as well  She has been afebrile  Relevant Medications    acetaminophen (TYLENOL) 500 mg tablet      Other Visit Diagnoses     Annual physical exam        Relevant Orders    CBC and differential    Basic metabolic panel    Mixed hyperlipidemia        Relevant Orders    Lipid Panel with Direct LDL reflex    Prediabetes        Relevant Orders    HEMOGLOBIN A1C W/ EAG ESTIMATION    Other constipation        Relevant Medications    docusate sodium (COLACE) 100 mg capsule    Screening-pulmonary TB        Relevant Orders    Quantiferon TB Gold Plus              Return in about 4 months (around 2022) for Dr Lenny Napier  Chief Complaint:     Chief Complaint   Patient presents with    Physical Exam    Sore Throat     Chest congestion   Constipation        History of Present Illness:    patient presents to the office today for follow-up  She has multiple complaints including a sore throat and constipation  These are addressed above     Yearly physical exam was not performed due to the above complaints  This will be done in the future  Blood work was ordered for the patient have done prior to next appointment  Review of Systems:     Review of Systems   Constitutional: Negative for activity change, fatigue and fever  HENT: Positive for sore throat  Negative for congestion, hearing loss, rhinorrhea, trouble swallowing and voice change  Eyes: Negative for photophobia, pain, discharge and visual disturbance  Respiratory: Positive for cough (chronic)  Negative for chest tightness and shortness of breath  Cardiovascular: Negative for chest pain, palpitations and leg swelling  Gastrointestinal: Positive for constipation  Negative for abdominal pain, blood in stool, nausea and vomiting  Endocrine: Negative for cold intolerance and heat intolerance  Genitourinary: Negative for difficulty urinating, frequency, hematuria, urgency, vaginal bleeding and vaginal discharge  Musculoskeletal: Negative for arthralgias and myalgias  Skin: Negative  Neurological: Negative for dizziness, weakness, numbness and headaches  Psychiatric/Behavioral: Negative for decreased concentration  The patient is not nervous/anxious           Problem List:     Patient Active Problem List   Diagnosis    [facility]ffective [facility], [facility] type [facility] )    LYNN (generalized anxiety disorder)    Slow transit constipation    Degenerative disc disease, lumbar    Post-traumatic stress disorder, chronic    Mild intermittent asthma without complication    Tobacco abuse    Gastroesophageal reflux disease    Otitis media    Vitamin D deficiency    Hemorrhoids    Continuous leakage of urine    Mixed stress and urge urinary incontinence    Right lower quadrant abdominal pain    Chest tightness    Hoarseness    Other headache syndrome    Memory difficulty    Dependence on nicotine from cigarettes    Emphysema lung ([facility] )    History of alcohol dependence ([facility] )  Sore throat        Objective:     /76 (BP Location: Left arm, Patient Position: Sitting, Cuff Size: Standard)   Pulse (!) 108   Temp (!) 97 3 °F (36 3 °C) (Temporal)   Resp 20   Ht 5' (1 524 m)   Wt 73 9 kg (163 lb)   LMP  (LMP Unknown)   BMI 31 83 kg/m²     Current Outpatient Medications   Medication Instructions    acetaminophen (TYLENOL) 1,000 mg, Oral, Every 6 hours PRN    albuterol (PROVENTIL HFA,VENTOLIN HFA) 90 mcg/act inhaler 2 puffs, Inhalation, Every 6 hours PRN    albuterol 2 5 mg, Nebulization, Every 6 hours PRN    benzonatate (TESSALON PERLES) 100 mg, Oral, 2 times daily PRN    benztropine (COGENTIN) 1 mg, Oral, 2 times daily    cloZAPine (CLOZARIL) 100 mg, Oral, 2 times daily    clozapine (CLOZARIL) 200 mg, Oral, Daily at bedtime    Diclofenac Sodium (VOLTAREN) 2 g, Topical, 4 times daily PRN    diphenhydrAMINE (BENADRYL) 25 mg, Oral, Daily at bedtime    docusate sodium (COLACE) 100 mg, Oral, 2 times daily    escitalopram (LEXAPRO) 20 mg, Oral, Daily    fenofibrate (TRICOR) 145 mg tablet 1 TAB ORALLY EVERY MORNING DX:HYPERLIPIDEMIA    gabapentin (NEURONTIN) 600 mg, Oral, 4 times daily    Halls Cough Drops 5 8 mg, Mouth/Throat, Every 2 hour PRN    Incontinence Supply Disposable (Depend Underwear Sm/Med) MISC Does not apply, 3 times daily PRN    lidocaine (LIDODERM) 5 % APPLY 1 PATCH TOPICALLY TO PAINFUL AREA(S) EVERY MORNING AND REMOVE AT BEDTIME [ON 12HRS, OFF 12HRS]    LORazepam (ATIVAN) 0 5 mg, Oral, 4 times daily    Multiple Vitamin (Daily-Nia) TABS 1 TAB ORALLY EVERY MORNING DX: SUPPLEMENT    OLANZapine (ZyPREXA ZYDIS) 5 mg dispersible tablet As prn for voices, stop risperdone a sprn    OLANZapine (ZYPREXA) 10 mg, Oral, 2 times daily, Take 0 5 tablets twice daily    pantoprazole (PROTONIX) 40 mg tablet 1 TAB ORALLY EVERY MORNING DX: GERD    polyethylene glycol (MIRALAX) 17 g, Oral, Daily    Pramox-PE-Glycerin-Petrolatum (PREPARATION H MAX) 1-0 25-14 4-15 % rectal cream 1 application, Rectal, 4 times daily PRN    tiZANidine (ZANAFLEX) 4 mg tablet 1 TAB ORALLY EVERY 8 HOURS AS NEEDED FOR MUSCLE SPASMS         Physical Exam  Vitals reviewed  Constitutional:       Appearance: Normal appearance  She is obese  HENT:      Head: Normocephalic  Right Ear: Tympanic membrane and ear canal normal       Left Ear: Tympanic membrane and ear canal normal       Nose: Nose normal       Mouth/Throat:      Mouth: Mucous membranes are moist  No oral lesions  Pharynx: Oropharynx is clear  No pharyngeal swelling, oropharyngeal exudate, posterior oropharyngeal erythema or uvula swelling  Eyes:      Extraocular Movements: Extraocular movements intact  Pupils: Pupils are equal, round, and reactive to light  Cardiovascular:      Rate and Rhythm: Normal rate and regular rhythm  Heart sounds: Normal heart sounds  Pulmonary:      Effort: Pulmonary effort is normal       Breath sounds: Normal breath sounds  Musculoskeletal:         General: Normal range of motion  Cervical back: Normal range of motion and neck supple  Skin:     General: Skin is warm and dry  Neurological:      General: No focal deficit present  Mental Status: She is alert and oriented to person, place, and time  Psychiatric:         Mood and Affect: Mood normal          Behavior: Behavior normal          Thought Content:  Thought content normal          Judgment: Judgment normal          Avinash Medico, [address] [place] [address]

## 2022-08-01 ENCOUNTER — APPOINTMENT (OUTPATIENT)
Dept: LAB | Facility: CLINIC | Age: 52
End: 2022-08-01
Payer: COMMERCIAL

## 2022-08-01 DIAGNOSIS — Z79.899 ENCOUNTER FOR LONG-TERM (CURRENT) USE OF OTHER MEDICATIONS: ICD-10-CM

## 2022-08-01 LAB
BASOPHILS # BLD AUTO: 0.05 THOUSANDS/ΜL (ref 0–0.1)
BASOPHILS NFR BLD AUTO: 0 % (ref 0–1)
EOSINOPHIL # BLD AUTO: 0.33 THOUSAND/ΜL (ref 0–0.61)
EOSINOPHIL NFR BLD AUTO: 3 % (ref 0–6)
ERYTHROCYTE [DISTWIDTH] IN BLOOD BY AUTOMATED COUNT: 14.8 % (ref 11.6–15.1)
HCT VFR BLD AUTO: 43 % (ref 34.8–46.1)
HGB BLD-MCNC: 13.3 G/DL (ref 11.5–15.4)
IMM GRANULOCYTES # BLD AUTO: 0.1 THOUSAND/UL (ref 0–0.2)
IMM GRANULOCYTES NFR BLD AUTO: 1 % (ref 0–2)
LYMPHOCYTES # BLD AUTO: 1.98 THOUSANDS/ΜL (ref 0.6–4.47)
LYMPHOCYTES NFR BLD AUTO: 18 % (ref 14–44)
MCH RBC QN AUTO: 27.8 PG (ref 26.8–34.3)
MCHC RBC AUTO-ENTMCNC: 30.9 G/DL (ref 31.4–37.4)
MCV RBC AUTO: 90 FL (ref 82–98)
MONOCYTES # BLD AUTO: 0.49 THOUSAND/ΜL (ref 0.17–1.22)
MONOCYTES NFR BLD AUTO: 4 % (ref 4–12)
NEUTROPHILS # BLD AUTO: 8.39 THOUSANDS/ΜL (ref 1.85–7.62)
NEUTS SEG NFR BLD AUTO: 74 % (ref 43–75)
NRBC BLD AUTO-RTO: 0 /100 WBCS
PLATELET # BLD AUTO: 338 THOUSANDS/UL (ref 149–390)
PMV BLD AUTO: 9.8 FL (ref 8.9–12.7)
RBC # BLD AUTO: 4.79 MILLION/UL (ref 3.81–5.12)
WBC # BLD AUTO: 11.34 THOUSAND/UL (ref 4.31–10.16)

## 2022-08-01 PROCEDURE — 36415 COLL VENOUS BLD VENIPUNCTURE: CPT

## 2022-08-01 PROCEDURE — 85025 COMPLETE CBC W/AUTO DIFF WBC: CPT

## 2022-08-02 ENCOUNTER — HOSPITAL ENCOUNTER (OUTPATIENT)
Dept: MRI IMAGING | Facility: CLINIC | Age: 52
Discharge: HOME | End: 2022-08-02
Payer: COMMERCIAL

## 2022-08-02 DIAGNOSIS — R41.3 MEMORY DIFFICULTY: ICD-10-CM

## 2022-08-02 PROCEDURE — 70551 MRI BRAIN STEM W/O DYE: CPT

## 2022-08-02 PROCEDURE — G1004 CDSM NDSC: HCPCS

## 2022-08-03 ENCOUNTER — TELEPHONE (OUTPATIENT)
Dept: NEUROLOGY | Facility: CLINIC | Age: 52
End: 2022-08-03

## 2022-08-03 NOTE — TELEPHONE ENCOUNTER
----- Message from Ameya Still MD sent at 7/19/2022  9:24 AM EDT -----  Please call the patient regarding her abnormal result    Please advise her to follow up with family physician

## 2022-08-09 ENCOUNTER — TELEPHONE (OUTPATIENT)
Dept: NEUROLOGY | Facility: CLINIC | Age: 52
End: 2022-08-09

## 2022-08-09 NOTE — TELEPHONE ENCOUNTER
Tried to leave a voicemail but the phone number did not work  No alternative contact method   Due to this, I will be closing the referral

## 2022-08-10 ENCOUNTER — TELEPHONE (OUTPATIENT)
Dept: NEUROLOGY | Facility: CLINIC | Age: 52
End: 2022-08-10

## 2022-08-10 ENCOUNTER — TELEPHONE (OUTPATIENT)
Dept: INTERNAL MEDICINE CLINIC | Facility: CLINIC | Age: 52
End: 2022-08-10

## 2022-08-10 NOTE — TELEPHONE ENCOUNTER
I will not send an antibiotic in for the patientf she is not seen  She can be evaluated at any urgent care center or by any available provider in the office  i

## 2022-08-10 NOTE — TELEPHONE ENCOUNTER
Pt LVM on nursing line stating that she had an MRI of her head performed on 8/2/22  She says she still hasn't received results back  Pt states that she is having migraines and sharp pain in her head  Hoping we can get in touch with her to give her something for the pain  [facility]  Pt requesting results  Spoke with pt and she says this migraines has been happening off and on for the past 2 months  Describes it as sharp migraine pains around the temple, pain shooting into her head  Says it lasts all day long, sometimes 2 days long  Dr Terell Garcia - Please advise  MRI results available for your review  Best cb (temporary) [phone]  Will be at this phone number just for today

## 2022-08-10 NOTE — TELEPHONE ENCOUNTER
Burke Esteves has seen pt for her ongoing sore throat among other ailments Last visit July 22  Wanted to know if Burke Esteves could send and antibiotic     ALSO, would like to have new referral for Hematology and Oncology place  - last referral placed 1/28/22 - ( CLOSED) - pt never made appt  PT has a lot to do today so would rather not come in for a visit      Please advise: [phone]

## 2022-08-10 NOTE — TELEPHONE ENCOUNTER
Patient called just missed the call from Dr Carmela Mahtur   Please give a call back at [phone]

## 2022-08-11 ENCOUNTER — OFFICE VISIT (OUTPATIENT)
Dept: INTERNAL MEDICINE CLINIC | Facility: CLINIC | Age: 52
End: 2022-08-11
Payer: COMMERCIAL

## 2022-08-11 VITALS
WEIGHT: 161.2 LBS | OXYGEN SATURATION: 96 % | BODY MASS INDEX: 31.65 KG/M2 | DIASTOLIC BLOOD PRESSURE: 80 MMHG | HEART RATE: 94 BPM | HEIGHT: 60 IN | TEMPERATURE: 97.8 F | SYSTOLIC BLOOD PRESSURE: 116 MMHG

## 2022-08-11 DIAGNOSIS — R13.10 DYSPHAGIA, UNSPECIFIED TYPE: ICD-10-CM

## 2022-08-11 DIAGNOSIS — T78.40XD ALLERGY, SUBSEQUENT ENCOUNTER: Primary | ICD-10-CM

## 2022-08-11 DIAGNOSIS — J02.9 PHARYNGITIS, UNSPECIFIED ETIOLOGY: ICD-10-CM

## 2022-08-11 PROCEDURE — 99213 OFFICE O/P EST LOW 20 MIN: CPT | Performed by: FAMILY MEDICINE

## 2022-08-11 RX ORDER — LINACLOTIDE 72 UG/1
CAPSULE, GELATIN COATED ORAL
COMMUNITY

## 2022-08-11 RX ORDER — CETIRIZINE HYDROCHLORIDE 10 MG/1
10 TABLET ORAL DAILY
Qty: 30 TABLET | Refills: 0 | Status: SHIPPED | OUTPATIENT
Start: 2022-08-11 | End: 2022-08-11 | Stop reason: SDUPTHER

## 2022-08-11 RX ORDER — VENLAFAXINE HYDROCHLORIDE 37.5 MG/1
CAPSULE, EXTENDED RELEASE ORAL
COMMUNITY
Start: 2022-07-22

## 2022-08-11 RX ORDER — CETIRIZINE HYDROCHLORIDE 10 MG/1
10 TABLET ORAL DAILY
Qty: 30 TABLET | Refills: 2 | Status: SHIPPED | OUTPATIENT
Start: 2022-08-11 | End: 2022-09-28

## 2022-08-11 NOTE — TELEPHONE ENCOUNTER
Pt LVM x 2 regarding previous call  Asking for results of MRI and is still having migraine with shooting pain  Asking if something can be prescribed  Dr Slim Carvajal - Please advise  Best  [phone]

## 2022-08-11 NOTE — TELEPHONE ENCOUNTER
Discussed with patient in the MRI scan of the brain results, advised her to follow up with family physician regarding increased sed rate, discussed with patient regarding prophylactic medication for migraine headaches including Depakote and Topamax patient refusing it, she is already on gabapentin she was advised to take magnesium and riboflavin, she wants to follow-up with the family physician regarding the headaches and is not keen to go on any medications

## 2022-08-11 NOTE — PROGRESS NOTES
FOLLOW-UP OFFICE VISIT  [facility] [address] [place] [address]    NAME: Darian Willoughby  AGE: 47 y o  SEX: female  : 1970     DATE: 2022     Assessment and Plan:     Problem List Items Addressed This Visit    None     Visit Diagnoses     Allergy, subsequent encounter    -  Primary    Relevant Medications    cetirizine (ZyrTEC) 10 mg tablet    Pharyngitis, unspecified etiology        Relevant Orders    Ambulatory Referral to Otolaryngology    Dysphagia, unspecified type        Relevant Orders    Ambulatory Referral to Otolaryngology        differiental is allergy related pharyngitis or esophagitis 2/2 poorly controlled reflux  Will trial zyrtec  Pt is apart of [facility]  [facility] send [facility] to [facility]  Pt to follow up with ENT  May also need to see GI if antihistamine does not provide relief  Chief Complaint:     Chief Complaint   Patient presents with    Sore Throat     As per the patient she has had the sore throat for months and she has a light green colored phlegm that's very thick with cough  Patient stated she hash taken lozenges but they are not working         History of Present Illness:     C/o sore throat months  associated with rhinorrhea and productive cough and itchy watery eyes  Not using any over the counter therapies  Says swallowing is difficult because it feels like her throat is closing  Reports hx of allergies  Tested for covid a month ago and it was negative  Has reflux  On protonix  Review of Systems:     Review of Systems   Constitutional: Negative for fever  HENT: Positive for trouble swallowing  Negative for drooling, facial swelling and tinnitus  Eyes: Positive for itching  Respiratory: Positive for cough  Negative for shortness of breath  Cardiovascular: Negative for chest pain          Problem List:     Patient Active Problem List   Diagnosis    [facility]oaffective disorder, bipolar type ([facility] )    LYNN (generalized anxiety disorder)    Slow transit constipation    Degenerative disc disease, lumbar    Post-traumatic stress disorder, chronic    Mild intermittent asthma without complication    Tobacco abuse    Gastroesophageal reflux disease    Otitis media    Vitamin D deficiency    Hemorrhoids    Continuous leakage of urine    Mixed stress and urge urinary incontinence    Right lower quadrant abdominal pain    Chest tightness    Hoarseness    Other headache syndrome    Memory difficulty    Dependence on nicotine from cigarettes    Emphysema lung (HCC)    History of alcohol dependence (HCC)    Sore throat        Objective:     /80 (BP Location: Left arm, Patient Position: Sitting, Cuff Size: Standard) Comment: BP  Pulse 94   Temp 97 8 °F (36 6 °C) (Temporal) Comment: NO  Ht 5' (1 524 m)   Wt 73 1 kg (161 lb 3 2 oz)   LMP  (LMP Unknown)   SpO2 96%   BMI 31 48 kg/m²     Physical Exam  HENT:      Head: Normocephalic  Nose: No congestion or rhinorrhea  Mouth/Throat:      Mouth: Mucous membranes are moist       Pharynx: Uvula midline  No pharyngeal swelling, oropharyngeal exudate or uvula swelling  Cardiovascular:      Rate and Rhythm: Normal rate  Musculoskeletal:      Cervical back: Neck supple  Lymphadenopathy:      Cervical: No cervical adenopathy  Neurological:      Mental Status: She is alert           Angel GARCIAS HSPTLSandee March [facility]  8/11/2022 8:59 AM

## 2022-08-17 ENCOUNTER — APPOINTMENT (OUTPATIENT)
Dept: LAB | Facility: CLINIC | Age: 52
End: 2022-08-17
Payer: COMMERCIAL

## 2022-08-17 DIAGNOSIS — Z79.810 CARE RELATED TO CURRENT TAMOXIFEN USE: ICD-10-CM

## 2022-08-17 LAB
BASOPHILS # BLD AUTO: 0.05 THOUSANDS/ΜL (ref 0–0.1)
BASOPHILS NFR BLD AUTO: 0 % (ref 0–1)
EOSINOPHIL # BLD AUTO: 0.3 THOUSAND/ΜL (ref 0–0.61)
EOSINOPHIL NFR BLD AUTO: 2 % (ref 0–6)
ERYTHROCYTE [DISTWIDTH] IN BLOOD BY AUTOMATED COUNT: 14.8 % (ref 11.6–15.1)
HCT VFR BLD AUTO: 42.8 % (ref 34.8–46.1)
HGB BLD-MCNC: 13.5 G/DL (ref 11.5–15.4)
IMM GRANULOCYTES # BLD AUTO: 0.13 THOUSAND/UL (ref 0–0.2)
IMM GRANULOCYTES NFR BLD AUTO: 1 % (ref 0–2)
LYMPHOCYTES # BLD AUTO: 1.54 THOUSANDS/ΜL (ref 0.6–4.47)
LYMPHOCYTES NFR BLD AUTO: 12 % (ref 14–44)
MCH RBC QN AUTO: 28.5 PG (ref 26.8–34.3)
MCHC RBC AUTO-ENTMCNC: 31.5 G/DL (ref 31.4–37.4)
MCV RBC AUTO: 90 FL (ref 82–98)
MONOCYTES # BLD AUTO: 0.56 THOUSAND/ΜL (ref 0.17–1.22)
MONOCYTES NFR BLD AUTO: 4 % (ref 4–12)
NEUTROPHILS # BLD AUTO: 10.48 THOUSANDS/ΜL (ref 1.85–7.62)
NEUTS SEG NFR BLD AUTO: 81 % (ref 43–75)
NRBC BLD AUTO-RTO: 0 /100 WBCS
PLATELET # BLD AUTO: 323 THOUSANDS/UL (ref 149–390)
PMV BLD AUTO: 10.1 FL (ref 8.9–12.7)
RBC # BLD AUTO: 4.74 MILLION/UL (ref 3.81–5.12)
WBC # BLD AUTO: 13.06 THOUSAND/UL (ref 4.31–10.16)

## 2022-08-17 PROCEDURE — 85025 COMPLETE CBC W/AUTO DIFF WBC: CPT

## 2022-08-17 PROCEDURE — 36415 COLL VENOUS BLD VENIPUNCTURE: CPT

## 2022-08-26 DIAGNOSIS — K59.01 SLOW TRANSIT CONSTIPATION: ICD-10-CM

## 2022-08-26 RX ORDER — POLYETHYLENE GLYCOL 3350 17 G/17G
POWDER, FOR SOLUTION ORAL
Qty: 510 G | Refills: 4 | Status: SHIPPED | OUTPATIENT
Start: 2022-08-26

## 2022-08-29 ENCOUNTER — APPOINTMENT (OUTPATIENT)
Dept: LAB | Facility: CLINIC | Age: 52
End: 2022-08-29
Payer: COMMERCIAL

## 2022-08-29 DIAGNOSIS — Z79.810 CARE RELATED TO CURRENT TAMOXIFEN USE: ICD-10-CM

## 2022-08-29 DIAGNOSIS — Z79.899 ENCOUNTER FOR LONG-TERM (CURRENT) USE OF OTHER MEDICATIONS: ICD-10-CM

## 2022-08-29 LAB
BASOPHILS # BLD AUTO: 0.03 THOUSANDS/ΜL (ref 0–0.1)
BASOPHILS NFR BLD AUTO: 0 % (ref 0–1)
EOSINOPHIL # BLD AUTO: 0.16 THOUSAND/ΜL (ref 0–0.61)
EOSINOPHIL NFR BLD AUTO: 1 % (ref 0–6)
ERYTHROCYTE [DISTWIDTH] IN BLOOD BY AUTOMATED COUNT: 15 % (ref 11.6–15.1)
HCT VFR BLD AUTO: 45.1 % (ref 34.8–46.1)
HGB BLD-MCNC: 13.6 G/DL (ref 11.5–15.4)
IMM GRANULOCYTES # BLD AUTO: 0.08 THOUSAND/UL (ref 0–0.2)
IMM GRANULOCYTES NFR BLD AUTO: 1 % (ref 0–2)
LYMPHOCYTES # BLD AUTO: 1.42 THOUSANDS/ΜL (ref 0.6–4.47)
LYMPHOCYTES NFR BLD AUTO: 12 % (ref 14–44)
MCH RBC QN AUTO: 27.6 PG (ref 26.8–34.3)
MCHC RBC AUTO-ENTMCNC: 30.2 G/DL (ref 31.4–37.4)
MCV RBC AUTO: 92 FL (ref 82–98)
MONOCYTES # BLD AUTO: 0.38 THOUSAND/ΜL (ref 0.17–1.22)
MONOCYTES NFR BLD AUTO: 3 % (ref 4–12)
NEUTROPHILS # BLD AUTO: 9.65 THOUSANDS/ΜL (ref 1.85–7.62)
NEUTS SEG NFR BLD AUTO: 83 % (ref 43–75)
NRBC BLD AUTO-RTO: 0 /100 WBCS
PLATELET # BLD AUTO: 326 THOUSANDS/UL (ref 149–390)
PMV BLD AUTO: 10.5 FL (ref 8.9–12.7)
RBC # BLD AUTO: 4.93 MILLION/UL (ref 3.81–5.12)
WBC # BLD AUTO: 11.72 THOUSAND/UL (ref 4.31–10.16)

## 2022-08-29 PROCEDURE — 85025 COMPLETE CBC W/AUTO DIFF WBC: CPT

## 2022-08-29 PROCEDURE — 36415 COLL VENOUS BLD VENIPUNCTURE: CPT

## 2022-09-13 ENCOUNTER — APPOINTMENT (OUTPATIENT)
Dept: LAB | Facility: CLINIC | Age: 52
End: 2022-09-13
Payer: COMMERCIAL

## 2022-09-26 NOTE — NURSING NOTE
In bed eyes closed appears to be sleeping, body shifting and breath sounds noted, will continue to monitor Latex:  Patient denies allergy to latex.  Medications reviewed with patient.  Tobacco use verified.  Allergies verified.    CHIEF COMPLAINT  Left  SF injury    REFERRING PHYSICIAN:  Simona Wu MD    PRIMARY CARE PROVIDER - Simona Wu MD    HISTORY OF PRESENT ILLNESS        Occupation:  teacher       Current work status:  working with restrictions no lifting more than 10 lb  1.  When did the main problem begin?   2022  2.  Describe the injury and/or pain or other complaints:  Patient injured L SF while playing basketball. Pain rate 5/10 at present time. PIP remains swollen. Denies numbness/tingling   3.  Locate the area of the problem/pain:   anterior and posterior L SF  4. Is there any other pertinent background information?   None  5.  Has the patient had any treatment yet?   Xray       PAST MEDICAL HISTORY  Past Medical History:   Diagnosis Date   • Abnormal Pap smear of cervix     LEEP   • History of miscarriage     x 3   • Ulcerative colitis (CMS/HCC)     With chronic diarrhea-2015 under control since 2014       PAST SURGICAL HISTORY  Past Surgical History:   Procedure Laterality Date   • Anterior cruciate ligament repair      Right   • Back surgery  2022   •  section, low transverse  2012   • Colonoscopy diagnostic  2014    2 year repeat, UC   • Colonoscopy diagnostic  2017    - A fragment of colonic mucosa with minimal/focal acute inflammation and Inactive/quiescent chronic colitis, negative for dysplasia.   • Colonoscopy diagnostic  2019    Quiescent colitis, hyperplastic polyp   • Colonoscopy diagnostic  2021    Hyperplastic poyp,Colonic mucosa with focal quiescent colitis, no dysplasia   • Colonoscopy diagnostic  2019    Benign colonic mucosa with reactive and hyperplastic changes, Diminutive tubular adenoma   • Colonoscopy w biopsy  2002    IBS/colonic spasm,  ascending and descending colon bx's = mild increase in  interstitial inflammatory cells and edema,  no evidence of collagenous colitis seen,   lymphoid aggregates are also present in the descending colon bx   • Conization cervix,loop electrd  2000    Cervical dysplasia   • D and c  2007    Miscarriage   • Flexible sigmoidoscopy bx  04/09/2004    Severe chronic active colitis with crypt abscesses and remodeling   • Flexible sigmoidoscopy bx  09/24/2010   • Myringotomy w/ tubes      Childhood   • Tubal ligation  09/26/2015

## 2022-09-28 DIAGNOSIS — T78.40XD ALLERGY, SUBSEQUENT ENCOUNTER: ICD-10-CM

## 2022-09-28 DIAGNOSIS — K21.9 GASTROESOPHAGEAL REFLUX DISEASE, UNSPECIFIED WHETHER ESOPHAGITIS PRESENT: ICD-10-CM

## 2022-09-28 RX ORDER — PANTOPRAZOLE SODIUM 40 MG/1
TABLET, DELAYED RELEASE ORAL
Qty: 28 TABLET | Refills: 4 | Status: SHIPPED | OUTPATIENT
Start: 2022-09-28

## 2022-09-28 RX ORDER — CETIRIZINE HYDROCHLORIDE 10 MG/1
TABLET ORAL
Qty: 28 TABLET | Refills: 4 | Status: SHIPPED | OUTPATIENT
Start: 2022-09-28

## 2022-10-01 DIAGNOSIS — K59.01 SLOW TRANSIT CONSTIPATION: Primary | ICD-10-CM

## 2022-10-02 RX ORDER — LACTULOSE 10 G/15ML
SOLUTION ORAL; RECTAL
Qty: 946 ML | Refills: 4 | Status: SHIPPED | OUTPATIENT
Start: 2022-10-02

## 2022-10-11 ENCOUNTER — APPOINTMENT (OUTPATIENT)
Dept: LAB | Facility: CLINIC | Age: 52
End: 2022-10-11
Payer: COMMERCIAL

## 2022-10-11 DIAGNOSIS — Z79.899 ENCOUNTER FOR LONG-TERM (CURRENT) USE OF OTHER MEDICATIONS: ICD-10-CM

## 2022-10-11 PROCEDURE — 80159 DRUG ASSAY CLOZAPINE: CPT

## 2022-10-19 LAB
CLOZAPINE SERPL-MCNC: 1003 NG/ML (ref 350–650)
CLOZAPINE+NOR SERPL-MCNC: 1788 NG/ML
NORCLOZAPINE SERPL-MCNC: 785 NG/ML

## 2022-10-28 ENCOUNTER — APPOINTMENT (OUTPATIENT)
Dept: LAB | Facility: CLINIC | Age: 52
End: 2022-10-28
Payer: COMMERCIAL

## 2022-11-07 ENCOUNTER — HOSPITAL ENCOUNTER (EMERGENCY)
Facility: HOSPITAL | Age: 52
End: 2022-11-08
Attending: EMERGENCY MEDICINE

## 2022-11-07 DIAGNOSIS — F32.A DEPRESSION WITH SUICIDAL IDEATION: Primary | ICD-10-CM

## 2022-11-07 DIAGNOSIS — R45.851 DEPRESSION WITH SUICIDAL IDEATION: Primary | ICD-10-CM

## 2022-11-07 LAB
ALBUMIN SERPL BCP-MCNC: 4.3 G/DL (ref 3.5–5)
ALP SERPL-CCNC: 77 U/L (ref 34–104)
ALT SERPL W P-5'-P-CCNC: 17 U/L (ref 7–52)
AMPHETAMINES SERPL QL SCN: NEGATIVE
ANION GAP SERPL CALCULATED.3IONS-SCNC: 9 MMOL/L (ref 4–13)
AST SERPL W P-5'-P-CCNC: 13 U/L (ref 13–39)
ATRIAL RATE: 91 BPM
BARBITURATES UR QL: NEGATIVE
BASOPHILS # BLD AUTO: 0.04 THOUSANDS/ÂΜL (ref 0–0.1)
BASOPHILS NFR BLD AUTO: 0 % (ref 0–1)
BENZODIAZ UR QL: NEGATIVE
BILIRUB SERPL-MCNC: 0.28 MG/DL (ref 0.2–1)
BILIRUB UR QL STRIP: NEGATIVE
BUN SERPL-MCNC: 5 MG/DL (ref 5–25)
CALCIUM SERPL-MCNC: 9.8 MG/DL (ref 8.4–10.2)
CHLORIDE SERPL-SCNC: 99 MMOL/L (ref 96–108)
CLARITY UR: CLEAR
CO2 SERPL-SCNC: 25 MMOL/L (ref 21–32)
COCAINE UR QL: NEGATIVE
COLOR UR: YELLOW
CREAT SERPL-MCNC: 0.72 MG/DL (ref 0.6–1.3)
EOSINOPHIL # BLD AUTO: 0.29 THOUSAND/ÂΜL (ref 0–0.61)
EOSINOPHIL NFR BLD AUTO: 2 % (ref 0–6)
ERYTHROCYTE [DISTWIDTH] IN BLOOD BY AUTOMATED COUNT: 15.1 % (ref 11.6–15.1)
ETHANOL SERPL-MCNC: <10 MG/DL
EXT PREG TEST URINE: NEGATIVE
EXT. CONTROL ED NAV: NORMAL
FLUAV RNA RESP QL NAA+PROBE: NEGATIVE
FLUBV RNA RESP QL NAA+PROBE: NEGATIVE
GFR SERPL CREATININE-BSD FRML MDRD: 97 ML/MIN/1.73SQ M
GLUCOSE SERPL-MCNC: 87 MG/DL (ref 65–140)
GLUCOSE UR STRIP-MCNC: NEGATIVE MG/DL
HCT VFR BLD AUTO: 42.1 % (ref 34.8–46.1)
HGB BLD-MCNC: 13.4 G/DL (ref 11.5–15.4)
HGB UR QL STRIP.AUTO: NEGATIVE
IMM GRANULOCYTES # BLD AUTO: 0.12 THOUSAND/UL (ref 0–0.2)
IMM GRANULOCYTES NFR BLD AUTO: 1 % (ref 0–2)
KETONES UR STRIP-MCNC: NEGATIVE MG/DL
LEUKOCYTE ESTERASE UR QL STRIP: NEGATIVE
LYMPHOCYTES # BLD AUTO: 1.88 THOUSANDS/ÂΜL (ref 0.6–4.47)
LYMPHOCYTES NFR BLD AUTO: 16 % (ref 14–44)
MCH RBC QN AUTO: 28.8 PG (ref 26.8–34.3)
MCHC RBC AUTO-ENTMCNC: 31.8 G/DL (ref 31.4–37.4)
MCV RBC AUTO: 90 FL (ref 82–98)
METHADONE UR QL: NEGATIVE
MONOCYTES # BLD AUTO: 0.7 THOUSAND/ÂΜL (ref 0.17–1.22)
MONOCYTES NFR BLD AUTO: 6 % (ref 4–12)
NEUTROPHILS # BLD AUTO: 8.88 THOUSANDS/ÂΜL (ref 1.85–7.62)
NEUTS SEG NFR BLD AUTO: 75 % (ref 43–75)
NITRITE UR QL STRIP: NEGATIVE
NRBC BLD AUTO-RTO: 0 /100 WBCS
OPIATES UR QL SCN: NEGATIVE
OXYCODONE+OXYMORPHONE UR QL SCN: NEGATIVE
P AXIS: 67 DEGREES
PCP UR QL: NEGATIVE
PH UR STRIP.AUTO: 6 [PH]
PLATELET # BLD AUTO: 292 THOUSANDS/UL (ref 149–390)
PMV BLD AUTO: 8.8 FL (ref 8.9–12.7)
POTASSIUM SERPL-SCNC: 3.6 MMOL/L (ref 3.5–5.3)
PR INTERVAL: 156 MS
PROT SERPL-MCNC: 7.4 G/DL (ref 6.4–8.4)
PROT UR STRIP-MCNC: NEGATIVE MG/DL
QRS AXIS: 19 DEGREES
QRSD INTERVAL: 74 MS
QT INTERVAL: 392 MS
QTC INTERVAL: 482 MS
RBC # BLD AUTO: 4.66 MILLION/UL (ref 3.81–5.12)
RSV RNA RESP QL NAA+PROBE: NEGATIVE
SARS-COV-2 RNA RESP QL NAA+PROBE: NEGATIVE
SODIUM SERPL-SCNC: 133 MMOL/L (ref 135–147)
SP GR UR STRIP.AUTO: <=1.005 (ref 1–1.03)
T WAVE AXIS: 61 DEGREES
THC UR QL: NEGATIVE
TSH SERPL DL<=0.05 MIU/L-ACNC: 2.05 UIU/ML (ref 0.45–4.5)
UROBILINOGEN UR QL STRIP.AUTO: 0.2 E.U./DL
VENTRICULAR RATE: 91 BPM
WBC # BLD AUTO: 11.91 THOUSAND/UL (ref 4.31–10.16)

## 2022-11-07 RX ORDER — RISPERIDONE 0.25 MG/1
0.5 TABLET ORAL
Status: CANCELLED | OUTPATIENT
Start: 2022-11-07

## 2022-11-07 RX ORDER — MAGNESIUM HYDROXIDE/ALUMINUM HYDROXICE/SIMETHICONE 120; 1200; 1200 MG/30ML; MG/30ML; MG/30ML
30 SUSPENSION ORAL EVERY 4 HOURS PRN
Status: CANCELLED | OUTPATIENT
Start: 2022-11-07

## 2022-11-07 RX ORDER — LANOLIN ALCOHOL/MO/W.PET/CERES
3 CREAM (GRAM) TOPICAL
Status: CANCELLED | OUTPATIENT
Start: 2022-11-07

## 2022-11-07 RX ORDER — HALOPERIDOL 5 MG/ML
5 INJECTION INTRAMUSCULAR
Status: CANCELLED | OUTPATIENT
Start: 2022-11-07

## 2022-11-07 RX ORDER — LORAZEPAM 1 MG/1
1 TABLET ORAL ONCE
Status: COMPLETED | OUTPATIENT
Start: 2022-11-07 | End: 2022-11-07

## 2022-11-07 RX ORDER — RISPERIDONE 0.25 MG/1
0.25 TABLET ORAL
Status: CANCELLED | OUTPATIENT
Start: 2022-11-07

## 2022-11-07 RX ORDER — RISPERIDONE 1 MG/1
1 TABLET ORAL
Status: CANCELLED | OUTPATIENT
Start: 2022-11-07

## 2022-11-07 RX ORDER — HYDROXYZINE 50 MG/1
25 TABLET, FILM COATED ORAL
Status: CANCELLED | OUTPATIENT
Start: 2022-11-07

## 2022-11-07 RX ORDER — LORAZEPAM 2 MG/ML
1 INJECTION INTRAMUSCULAR
Status: CANCELLED | OUTPATIENT
Start: 2022-11-07

## 2022-11-07 RX ADMIN — LORAZEPAM 1 MG: 1 TABLET ORAL at 12:10

## 2022-11-07 NOTE — ED NOTES
Crisis attempt to talk to the pt  Pt appeared to be sleeping  Crisis will try again at the later time
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
## 2022-11-07 NOTE — ED NOTES
Crisis prepared 201 and obtained signatures  Pt informed about her rights and 72 hours notice  Pt also informed about inpatient psychiatric hospitalization  Pt asking for SLL placement

## 2022-11-08 ENCOUNTER — HOSPITAL ENCOUNTER (INPATIENT)
Facility: HOSPITAL | Age: 52
LOS: 10 days | Discharge: HOME | End: 2022-11-18
Attending: HOSPITALIST | Admitting: PSYCHIATRY & NEUROLOGY

## 2022-11-08 VITALS
TEMPERATURE: 96.8 F | HEIGHT: 61 IN | WEIGHT: 152 LBS | SYSTOLIC BLOOD PRESSURE: 113 MMHG | HEART RATE: 98 BPM | BODY MASS INDEX: 28.7 KG/M2 | DIASTOLIC BLOOD PRESSURE: 78 MMHG | OXYGEN SATURATION: 96 % | RESPIRATION RATE: 18 BRPM

## 2022-11-08 DIAGNOSIS — J45.21 MILD INTERMITTENT ASTHMATIC BRONCHITIS WITH ACUTE EXACERBATION: ICD-10-CM

## 2022-11-08 DIAGNOSIS — K59.00 CONSTIPATION: ICD-10-CM

## 2022-11-08 DIAGNOSIS — Z72.0 TOBACCO ABUSE: ICD-10-CM

## 2022-11-08 DIAGNOSIS — E78.5 HYPERLIPIDEMIA: ICD-10-CM

## 2022-11-08 DIAGNOSIS — M62.838 MUSCLE SPASM: ICD-10-CM

## 2022-11-08 DIAGNOSIS — E55.9 VITAMIN D DEFICIENCY: ICD-10-CM

## 2022-11-08 DIAGNOSIS — T78.40XD ALLERGY, SUBSEQUENT ENCOUNTER: ICD-10-CM

## 2022-11-08 DIAGNOSIS — F25.0 SCHIZOAFFECTIVE DISORDER, BIPOLAR TYPE (HCC): Primary | Chronic | ICD-10-CM

## 2022-11-08 DIAGNOSIS — K64.9 HEMORRHOIDS, UNSPECIFIED HEMORRHOID TYPE: ICD-10-CM

## 2022-11-08 DIAGNOSIS — F32.A DEPRESSION WITH SUICIDAL IDEATION: ICD-10-CM

## 2022-11-08 DIAGNOSIS — J30.9 ALLERGIC RHINITIS: ICD-10-CM

## 2022-11-08 DIAGNOSIS — R10.9 ABDOMINAL CRAMPS: ICD-10-CM

## 2022-11-08 DIAGNOSIS — K27.9 PEPTIC ULCER DISEASE: ICD-10-CM

## 2022-11-08 DIAGNOSIS — M79.671 RIGHT FOOT PAIN: ICD-10-CM

## 2022-11-08 DIAGNOSIS — R45.851 DEPRESSION WITH SUICIDAL IDEATION: ICD-10-CM

## 2022-11-08 RX ORDER — LORAZEPAM 1 MG/1
0.5 TABLET ORAL EVERY MORNING
Status: DISCONTINUED | OUTPATIENT
Start: 2022-11-08 | End: 2022-11-08 | Stop reason: HOSPADM

## 2022-11-08 RX ORDER — ESCITALOPRAM OXALATE 10 MG/1
20 TABLET ORAL DAILY
Status: DISCONTINUED | OUTPATIENT
Start: 2022-11-08 | End: 2022-11-08 | Stop reason: HOSPADM

## 2022-11-08 RX ORDER — HALOPERIDOL 5 MG/ML
5 INJECTION INTRAMUSCULAR
Status: DISCONTINUED | OUTPATIENT
Start: 2022-11-08 | End: 2022-11-18 | Stop reason: HOSPADM

## 2022-11-08 RX ORDER — RISPERIDONE 0.5 MG/1
0.5 TABLET ORAL
Status: DISCONTINUED | OUTPATIENT
Start: 2022-11-08 | End: 2022-11-09

## 2022-11-08 RX ORDER — RISPERIDONE 1 MG/1
1 TABLET ORAL
Status: DISCONTINUED | OUTPATIENT
Start: 2022-11-08 | End: 2022-11-11

## 2022-11-08 RX ORDER — ALBUTEROL SULFATE 90 UG/1
2 AEROSOL, METERED RESPIRATORY (INHALATION) EVERY 6 HOURS PRN
Status: DISCONTINUED | OUTPATIENT
Start: 2022-11-08 | End: 2022-11-18 | Stop reason: HOSPADM

## 2022-11-08 RX ORDER — ACETAMINOPHEN 325 MG/1
650 TABLET ORAL EVERY 4 HOURS PRN
Status: DISCONTINUED | OUTPATIENT
Start: 2022-11-08 | End: 2022-11-18 | Stop reason: HOSPADM

## 2022-11-08 RX ORDER — DICYCLOMINE HCL 20 MG
20 TABLET ORAL 4 TIMES DAILY PRN
Status: DISCONTINUED | OUTPATIENT
Start: 2022-11-08 | End: 2022-11-18 | Stop reason: HOSPADM

## 2022-11-08 RX ORDER — BENZTROPINE MESYLATE 0.5 MG/1
1 TABLET ORAL 2 TIMES DAILY
Status: DISCONTINUED | OUTPATIENT
Start: 2022-11-08 | End: 2022-11-08 | Stop reason: HOSPADM

## 2022-11-08 RX ORDER — POLYETHYLENE GLYCOL 3350 17 G/17G
17 POWDER, FOR SOLUTION ORAL DAILY PRN
Status: DISCONTINUED | OUTPATIENT
Start: 2022-11-08 | End: 2022-11-18 | Stop reason: HOSPADM

## 2022-11-08 RX ORDER — MAGNESIUM HYDROXIDE/ALUMINUM HYDROXICE/SIMETHICONE 120; 1200; 1200 MG/30ML; MG/30ML; MG/30ML
30 SUSPENSION ORAL EVERY 4 HOURS PRN
Status: DISCONTINUED | OUTPATIENT
Start: 2022-11-08 | End: 2022-11-18 | Stop reason: HOSPADM

## 2022-11-08 RX ORDER — LORAZEPAM 2 MG/ML
1 INJECTION INTRAMUSCULAR
Status: DISCONTINUED | OUTPATIENT
Start: 2022-11-08 | End: 2022-11-09

## 2022-11-08 RX ORDER — OLANZAPINE 5 MG/1
5 TABLET, ORALLY DISINTEGRATING ORAL DAILY
Status: DISCONTINUED | OUTPATIENT
Start: 2022-11-08 | End: 2022-11-08 | Stop reason: HOSPADM

## 2022-11-08 RX ORDER — LORAZEPAM 1 MG/1
1 TABLET ORAL
Status: ON HOLD | COMMUNITY
End: 2022-11-16 | Stop reason: SDUPTHER

## 2022-11-08 RX ORDER — LANOLIN ALCOHOL/MO/W.PET/CERES
3 CREAM (GRAM) TOPICAL
Status: DISCONTINUED | OUTPATIENT
Start: 2022-11-08 | End: 2022-11-11

## 2022-11-08 RX ORDER — NICOTINE 21 MG/24HR
1 PATCH, TRANSDERMAL 24 HOURS TRANSDERMAL DAILY
Status: DISCONTINUED | OUTPATIENT
Start: 2022-11-09 | End: 2022-11-18 | Stop reason: HOSPADM

## 2022-11-08 RX ORDER — RISPERIDONE 0.25 MG/1
0.25 TABLET ORAL
Status: DISCONTINUED | OUTPATIENT
Start: 2022-11-08 | End: 2022-11-09

## 2022-11-08 RX ORDER — HYDROXYZINE HYDROCHLORIDE 25 MG/1
25 TABLET, FILM COATED ORAL
Status: DISCONTINUED | OUTPATIENT
Start: 2022-11-08 | End: 2022-11-09

## 2022-11-08 RX ORDER — VENLAFAXINE HYDROCHLORIDE 37.5 MG/1
37.5 CAPSULE, EXTENDED RELEASE ORAL DAILY
Status: DISCONTINUED | OUTPATIENT
Start: 2022-11-08 | End: 2022-11-08 | Stop reason: HOSPADM

## 2022-11-08 RX ORDER — DOCUSATE SODIUM 100 MG/1
100 CAPSULE, LIQUID FILLED ORAL DAILY
Status: DISCONTINUED | OUTPATIENT
Start: 2022-11-08 | End: 2022-11-18 | Stop reason: HOSPADM

## 2022-11-08 RX ORDER — PANTOPRAZOLE SODIUM 40 MG/1
40 TABLET, DELAYED RELEASE ORAL
Status: DISCONTINUED | OUTPATIENT
Start: 2022-11-08 | End: 2022-11-09

## 2022-11-08 RX ORDER — CLOZAPINE 100 MG/1
100 TABLET ORAL DAILY
Status: DISCONTINUED | OUTPATIENT
Start: 2022-11-08 | End: 2022-11-08 | Stop reason: HOSPADM

## 2022-11-08 RX ORDER — CYCLOBENZAPRINE HCL 10 MG
10 TABLET ORAL 3 TIMES DAILY PRN
Status: DISCONTINUED | OUTPATIENT
Start: 2022-11-08 | End: 2022-11-09

## 2022-11-08 RX ORDER — ACETAMINOPHEN 325 MG/1
975 TABLET ORAL EVERY 6 HOURS PRN
Status: DISCONTINUED | OUTPATIENT
Start: 2022-11-08 | End: 2022-11-18 | Stop reason: HOSPADM

## 2022-11-08 RX ADMIN — RISPERIDONE 1 MG: 1 TABLET ORAL at 22:39

## 2022-11-08 RX ADMIN — CYCLOBENZAPRINE HYDROCHLORIDE 10 MG: 10 TABLET, FILM COATED ORAL at 20:54

## 2022-11-08 RX ADMIN — ESCITALOPRAM OXALATE 20 MG: 10 TABLET ORAL at 12:09

## 2022-11-08 RX ADMIN — Medication 3 MG: at 20:54

## 2022-11-08 RX ADMIN — DOCUSATE SODIUM 100 MG: 100 CAPSULE, LIQUID FILLED ORAL at 14:40

## 2022-11-08 RX ADMIN — BENZTROPINE MESYLATE 1 MG: 0.5 TABLET ORAL at 12:09

## 2022-11-08 RX ADMIN — OLANZAPINE 5 MG: 5 TABLET, ORALLY DISINTEGRATING ORAL at 12:09

## 2022-11-08 RX ADMIN — ACETAMINOPHEN 975 MG: 325 TABLET ORAL at 22:39

## 2022-11-08 RX ADMIN — PANTOPRAZOLE SODIUM 40 MG: 40 TABLET, DELAYED RELEASE ORAL at 20:55

## 2022-11-08 RX ADMIN — LORAZEPAM 0.5 MG: 1 TABLET ORAL at 12:09

## 2022-11-08 RX ADMIN — ALUMINUM HYDROXIDE, MAGNESIUM HYDROXIDE, AND SIMETHICONE 30 ML: 200; 200; 20 SUSPENSION ORAL at 20:56

## 2022-11-08 RX ADMIN — DICYCLOMINE HYDROCHLORIDE 20 MG: 20 TABLET ORAL at 20:55

## 2022-11-08 NOTE — EMTALA/ACUTE CARE TRANSFER
[address] [facility] [address]  Dept: [phone]      EMTALA TRANSFER CONSENT    NAME Jc Mackey                                         1970                              MRN [id number]    I have been informed of my rights regarding examination, treatment, and transfer   by Dr Justina Menjivar , *    Benefits: Continuity of care    Risks: Potential for delay in receiving treatment      Consent for Transfer:  I acknowledge that my medical condition has been evaluated and explained to me by the emergency department physician or other qualified medical person and/or my attending physician, who has recommended that I be transferred to the service of  Accepting Physician: Dr Reji Worthington at [address] Name, [address] : Patient is accepted at [facility]  Franck Armas The above potential benefits of such transfer, the potential risks associated with such transfer, and the probable risks of not being transferred have been explained to me, and I fully understand them  The doctor has explained that, in my case, the benefits of transfer outweigh the risks  I agree to be transferred  I authorize the performance of emergency medical procedures and treatments upon me in both transit and upon arrival at the receiving facility  Additionally, I authorize the release of any and all medical records to the receiving facility and request they be transported with me, if possible  I understand that the safest mode of transportation during a medical emergency is an ambulance and that the Hospital advocates the use of this mode of transport  Risks of traveling to the receiving facility by car, including absence of medical control, life sustaining equipment, such as oxygen, and medical personnel has been explained to me and I fully understand them      (OSEI CORRECT BOX BELOW)  [ X ]  I consent to the stated transfer and to be transported by ambulance/helicopter  [  ]  I consent to the stated transfer, but refuse transportation by ambulance and accept full responsibility for my transportation by car  I understand the risks of non-ambulance transfers and I exonerate the Hospital and its staff from any deterioration in my condition that results from this refusal     X___________________________________________    DATE  22  TIME________  Signature of patient or legally responsible individual signing on patient behalf           RELATIONSHIP TO PATIENT_________________________          Provider Certification    NAME Karis Villalba                                         1970                              MRN [id number]    A medical screening exam was performed on the above named patient  Based on the examination:    Condition Necessitating Transfer The encounter diagnosis was Depression with suicidal ideation  Patient Condition: The patient has been stabilized such that within reasonable medical probability, no material deterioration of the patient condition or the condition of the unborn child(yeni) is likely to result from the transfer    Reason for Transfer: Level of Care needed not available at this facility    Transfer Requirements: Facility Patient is accepted at [facility]  · Space available and qualified personnel available for treatment as acknowledged by    · Agreed to accept transfer and to provide appropriate medical treatment as acknowledged by       Dr Darron Perdomo  · Appropriate medical records of the examination and treatment of the patient are provided at the time of transfer   [address] _______  · Transfer will be performed by qualified personnel from [facility]  and appropriate transfer equipment as required, including the use of necessary and appropriate life support measures      Provider Certification: I have examined the patient and explained the following risks and benefits of being transferred/refusing transfer to the patient/family:  General risk, such as traffic hazards, adverse weather conditions, rough terrain or turbulence, possible failure of equipment (including vehicle or aircraft), or consequences of actions of persons outside the control of the transport personnel, The patient is stable for psychiatric transfer because they are medically stable, and is protected from harming him/herself or others during transport      Based on these reasonable risks and benefits to the patient and/or the unborn child(yeni), and based upon the information available at the time of the patient’s examination, I certify that the medical benefits reasonably to be expected from the provision of appropriate medical treatments at another medical facility outweigh the increasing risks, if any, to the individual’s medical condition, and in the case of labor to the unborn child, from effecting the transfer      X____________________________________________ DATE 11/08/22        TIME_______      ORIGINAL - SEND TO MEDICAL RECORDS   COPY - SEND WITH PATIENT DURING TRANSFER

## 2022-11-08 NOTE — ED CARE HANDOFF
Emergency Department Sign Out Note        Sign out and transfer of care from Dr Sabrina Grimm  See Separate Emergency Department note  The patient, Vibha Marmolejo, was evaluated by the previous provider for 201/SI  Workup Completed:  Psych clearance    ED Course / Workup Pending (followup):                                      ED Course as of 11/08/22 0535   Mon Nov 07, 2022   2217 Received in sign out:     Pt is 201 for SI    Medically cleared:   Cbc, cmp, Upreg, uds, covid, ETOH done    No issues during last shift    Bed search in progress       Procedures  MDM        Disposition  Final diagnoses:   Depression with suicidal ideation     Time reflects when diagnosis was documented in both MDM as applicable and the Disposition within this note     Time User Action Codes Description Comment    11/7/2022  1:33 PM Vance Dumont Add Baldo HAYES,  R40 485] Depression with suicidal ideation       ED Disposition     ED Disposition   Transfer to [address] [place] [address]   --    Date/Time   Mon Nov 7, 2022  1:33 PM    Comment   Vibha Marmolejo should be transferred out to [facility] and has been medically cleared  MD Documentation    [address] Most Recent Value   Sending MD Ahmet Gonzalez      Follow-up Information    None       Patient's Medications   Discharge Prescriptions    No medications on file     No discharge procedures on file         ED Provider  Electronically Signed by     Burdette Hashimoto, MD  11/08/22 4461

## 2022-11-08 NOTE — ED NOTES
Insurance Authorization for admission:   Phone call placed to [facility]  Phone number: [phone]  Spoke to [place]  14 days approved  Level of care: 201  Review on 11/21/2022  Authorization # [id number]

## 2022-11-08 NOTE — PROGRESS NOTES
Pt admitted with list of belongings:    Remains with patient:  Pink slip on shoes  Pink pants  White sports bra  Tan sweater  Black slippers  Orange and black pants  Grey pants  Underwear x2  Green short sleeve shirt  Grey short sleeve shirt  Grey and pink long sleeve shirt  Grey tank  Pink tank  Toothpaste   Conditioner    Locked cabinet in room:  Black and white make up bag  White scarf  Green and white bag with misc  Items  Yellow and black bag with misc   Clothing    Contraband cabinet behind nurses station:  Wm Rangel Regan  statusboom  Cell phone with   Pink/orange [facility]\"" safe  Safe bag [id number]  White metal necklace with purple stone pendant  Black wallet with [employer]     Pt signed and agreed to belongings on list

## 2022-11-08 NOTE — ED CARE HANDOFF
Emergency Department Sign Out Note        Sign out and transfer of care from Dr Arredondo Case  See Separate Emergency Department note  The patient, Vibha Marmolejo, was evaluated by the previous provider for suicidal ideation with plan  Workup Completed:  Patient had emergency department workup which consisted of a urinalysis, ethanol, rapid drug screen, pregnancy test, COVID swab, CMP, and TSH  ED Course / Workup Pending (followup): Patient was medically cleared prior to my arrival and is awaiting behavioral health bed placement  ED Course as of 11/09/22 1539   Tue Nov 08, 2022   9167 Received in sign-out  Patient is medically clear for behavior health placement  Patient has signed a 201 and is currently awaiting bed  Would not recind 201 per previous ED provider   2846 note     Procedures  MDM        Disposition  Final diagnoses:   Depression with suicidal ideation     Time reflects when diagnosis was documented in both MDM as applicable and the Disposition within this note     Time User Action Codes Description Comment    11/7/2022  1:33 PM Vance Dumont Add Baldo HAYES,  R40 812] Depression with suicidal ideation       ED Disposition     ED Disposition   Transfer to [address] [place] [address]   --    Date/Time   Mon Nov 7, 2022  1:33 PM    Comment   Vibha Marmolejo should be transferred out to [facility] and has been medically cleared             MD Documentation    Lark Sacks Most Recent Value   Patient Condition The patient has been stabilized such that within reasonable medical probability, no material deterioration of the patient condition or the condition of the unborn child(yeni) is likely to result from the transfer   Reason for Transfer Level of Care needed not available at this facility   Benefits of Transfer Continuity of care   Risks of Transfer Potential for delay in receiving treatment   Accepting Physician Dr Eller Escort Name, [address] Patient is accepted at [facility]  Transported by [employer] and Unit #) [facility]   Sending MD Justina Menjivar , DO   Provider Certification General risk, such as traffic hazards, adverse weather conditions, rough terrain or turbulence, possible failure of equipment (including vehicle or aircraft), or consequences of actions of persons outside the control of the transport personnel, The patient is stable for psychiatric transfer because they are medically stable, and is protected from harming him/herself or others during transport      RN Documentation    [address] Name, [address]  Patient is accepted at [facility]     Transported by [employer] and Unit #) [facility]      Follow-up Information    None       Discharge Medication List as of 11/8/2022 12:46 PM      CONTINUE these medications which have NOT CHANGED    Details   albuterol (2 5 mg/3 mL) 0 083 % nebulizer solution Take 3 mL (2 5 mg total) by nebulization every 6 (six) hours as needed for wheezing or shortness of breath, Starting Fri 1/21/2022, Normal      albuterol (PROVENTIL HFA,VENTOLIN HFA) 90 mcg/act inhaler Inhale 2 puffs every 6 (six) hours as needed for wheezing or shortness of breath, Starting Fri 1/21/2022, Normal      aluminum-magnesium hydroxide-simethicone (MYLANTA) [id number] mg/30 mL suspension Take 30 mL by mouth every 4 (four) hours as needed, Starting Tue 9/27/2022, Historical Med      Banophen 25 MG capsule Starting Fri 9/9/2022, Historical Med      benztropine (COGENTIN) 1 mg tablet Take 1 tablet (1 mg total) by mouth 2 (two) times a day, Starting Tue 7/27/2021, Normal      cetirizine (ZyrTEC) 10 mg tablet 1 TAB ORALLY EVERY MORNING DX:ALLERGIES, Normal      !! cloZAPine (CLOZARIL) 100 mg tablet Take 100 mg by mouth daily 100 mg in AM, Starting Thu 3/24/2022, Historical Med      !! CLOZAPINE PO Take 400 mg by mouth daily at bedtime, Starting Thu 3/24/2022, Historical Med      cyclobenzaprine (FLEXERIL) 10 mg tablet Take 10 mg by mouth Three times daily as needed for muscle spasms, Starting Tue 9/27/2022, Until Thu 10/27/2022 at 2359, Historical Med      Diclofenac Sodium (VOLTAREN) 1 % Apply 2 g topically 4 (four) times a day as needed (as needed for pain), Starting Wed 10/27/2021, Normal      diphenhydrAMINE (BENADRYL) 25 mg tablet Take 1 tablet (25 mg total) by mouth daily at bedtime, Starting Fri 8/13/2021, Normal      escitalopram (LEXAPRO) 20 mg tablet Take 20 mg by mouth daily  , Starting Mon 1/10/2022, Historical Med      fenofibrate (TRICOR) 145 mg tablet 1 TAB ORALLY EVERY MORNING DX:HYPERLIPIDEMIA, Normal      fenofibrate micronized (LOFIBRA) 134 MG capsule Starting Thu 9/22/2022, Historical Med      !! gabapentin (NEURONTIN) 300 mg capsule Take 2 capsules (600 mg total) by mouth 4 (four) times a day, Starting Tue 7/27/2021, Normal      !! gabapentin (NEURONTIN) 400 mg capsule Starting Wed 8/17/2022, Historical Med      hydrocortisone 2 5 % ointment Starting Wed 8/10/2022, Historical Med      Incontinence Supply Disposable (Depend Underwear Sm/Med) MISC Use 3 (three) times a day as needed (incontinence), Starting Thu 12/16/2021, Until Fri 7/22/2022 at 2359, Normal      lactulose 10 g/15 mL 30ML ORALLY DAILY DX:CONSTIPATION *NO REFILLS*, Normal      lidocaine (LIDODERM) 5 % APPLY 1 PATCH TOPICALLY TO PAINFUL AREA(S) EVERY MORNING AND REMOVE AT BEDTIME [ON 12HRS, OFF 12HRS], Normal      linaCLOtide (Linzess) 72 MCG CAPS Take by mouth, Historical Med      LORazepam (ATIVAN) 1 mg tablet Take 1 mg by mouth daily at bedtime, Historical Med      OLANZapine (ZyPREXA ZYDIS) 5 mg dispersible tablet As prn for voices, stop risperdone a sprn, Print      pantoprazole (PROTONIX) 40 mg tablet 1 TAB ORALLY EVERY MORNING DX: GERD, Normal      Pramox-PE-Glycerin-Petrolatum (PREPARATION H MAX) 1-0 25-14 4-15 % rectal cream Insert 1 application into the rectum 4 (four) times a day as needed (p r n  hemorrhoidal pain), Starting Tue 7/27/2021, Normal      SM ClearLax 17 GM/SCOOP powder MIX 1 CAPFUL (17GM) INTO 8 OZ  OF LIQUID AND DRINK EVERY MORNING DX: CONSTIPATION, Normal      tiZANidine (ZANAFLEX) 4 mg tablet 1 TAB ORALLY EVERY 8 HOURS AS NEEDED FOR MUSCLE SPASMS, Normal      venlafaxine (EFFEXOR-XR) 37 5 mg 24 hr capsule Take 37 5 mg by mouth daily, Starting Fri 7/22/2022, Historical Med       !! - Potential duplicate medications found  Please discuss with provider  No discharge procedures on file         ED Provider  Electronically Signed by     Neha Parkinson DO  11/09/22 5250
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
## 2022-11-08 NOTE — ED NOTES
Patient is accepted at [facility]  Patient is accepted by Dr Helen Daniels per Terry/Intake  Transportation is arranged with **     Transportation is scheduled for **  Patient may go to the floor at anytime after 9:00  Nurse report is to be called to [phone] prior to patient transfer
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
## 2022-11-09 LAB
25(OH)D3 SERPL-MCNC: 25.5 NG/ML (ref 30–100)
ANION GAP SERPL CALCULATED.3IONS-SCNC: 8 MMOL/L (ref 4–13)
BASOPHILS # BLD AUTO: 0.04 THOUSANDS/ÂΜL (ref 0–0.1)
BASOPHILS NFR BLD AUTO: 1 % (ref 0–1)
BUN SERPL-MCNC: 7 MG/DL (ref 5–25)
CALCIUM SERPL-MCNC: 9.4 MG/DL (ref 8.4–10.2)
CHLORIDE SERPL-SCNC: 101 MMOL/L (ref 96–108)
CHOLEST SERPL-MCNC: 238 MG/DL
CO2 SERPL-SCNC: 29 MMOL/L (ref 21–32)
CREAT SERPL-MCNC: 0.64 MG/DL (ref 0.6–1.3)
EOSINOPHIL # BLD AUTO: 0.3 THOUSAND/ÂΜL (ref 0–0.61)
EOSINOPHIL NFR BLD AUTO: 4 % (ref 0–6)
ERYTHROCYTE [DISTWIDTH] IN BLOOD BY AUTOMATED COUNT: 15.2 % (ref 11.6–15.1)
FOLATE SERPL-MCNC: >20 NG/ML (ref 3.1–17.5)
GFR SERPL CREATININE-BSD FRML MDRD: 103 ML/MIN/1.73SQ M
GLUCOSE SERPL-MCNC: 95 MG/DL (ref 65–140)
HCT VFR BLD AUTO: 44.6 % (ref 34.8–46.1)
HDLC SERPL-MCNC: 47 MG/DL
HGB BLD-MCNC: 14.1 G/DL (ref 11.5–15.4)
IMM GRANULOCYTES # BLD AUTO: 0.08 THOUSAND/UL (ref 0–0.2)
IMM GRANULOCYTES NFR BLD AUTO: 1 % (ref 0–2)
LDLC SERPL CALC-MCNC: 138 MG/DL (ref 0–100)
LYMPHOCYTES # BLD AUTO: 1.42 THOUSANDS/ÂΜL (ref 0.6–4.47)
LYMPHOCYTES NFR BLD AUTO: 17 % (ref 14–44)
MCH RBC QN AUTO: 28.5 PG (ref 26.8–34.3)
MCHC RBC AUTO-ENTMCNC: 31.6 G/DL (ref 31.4–37.4)
MCV RBC AUTO: 90 FL (ref 82–98)
MONOCYTES # BLD AUTO: 0.5 THOUSAND/ÂΜL (ref 0.17–1.22)
MONOCYTES NFR BLD AUTO: 6 % (ref 4–12)
NEUTROPHILS # BLD AUTO: 5.98 THOUSANDS/ÂΜL (ref 1.85–7.62)
NEUTS SEG NFR BLD AUTO: 71 % (ref 43–75)
NONHDLC SERPL-MCNC: 191 MG/DL
NRBC BLD AUTO-RTO: 0 /100 WBCS
PLATELET # BLD AUTO: 313 THOUSANDS/UL (ref 149–390)
PMV BLD AUTO: 9.6 FL (ref 8.9–12.7)
POTASSIUM SERPL-SCNC: 4 MMOL/L (ref 3.5–5.3)
RBC # BLD AUTO: 4.95 MILLION/UL (ref 3.81–5.12)
RPR SER QL: NORMAL
SODIUM SERPL-SCNC: 138 MMOL/L (ref 135–147)
TRIGL SERPL-MCNC: 263 MG/DL
VIT B12 SERPL-MCNC: 501 PG/ML (ref 100–900)
WBC # BLD AUTO: 8.32 THOUSAND/UL (ref 4.31–10.16)

## 2022-11-09 RX ORDER — MINERAL OIL AND PETROLATUM 150; 830 MG/G; MG/G
OINTMENT OPHTHALMIC
Status: DISCONTINUED | OUTPATIENT
Start: 2022-11-09 | End: 2022-11-18 | Stop reason: HOSPADM

## 2022-11-09 RX ORDER — CLOZAPINE 100 MG/1
100 TABLET ORAL DAILY
Status: DISCONTINUED | OUTPATIENT
Start: 2022-11-10 | End: 2022-11-09

## 2022-11-09 RX ORDER — TRAZODONE HYDROCHLORIDE 50 MG/1
50 TABLET ORAL
Status: DISCONTINUED | OUTPATIENT
Start: 2022-11-09 | End: 2022-11-11

## 2022-11-09 RX ORDER — CLOZAPINE 100 MG/1
100 TABLET ORAL ONCE
Status: COMPLETED | OUTPATIENT
Start: 2022-11-09 | End: 2022-11-09

## 2022-11-09 RX ORDER — LORATADINE 10 MG/1
10 TABLET ORAL DAILY
Refills: 4 | Status: DISCONTINUED | OUTPATIENT
Start: 2022-11-09 | End: 2022-11-18 | Stop reason: HOSPADM

## 2022-11-09 RX ORDER — CLOZAPINE 25 MG/1
50 TABLET ORAL
Status: COMPLETED | OUTPATIENT
Start: 2022-11-10 | End: 2022-11-10

## 2022-11-09 RX ORDER — FENOFIBRATE 145 MG/1
145 TABLET, COATED ORAL DAILY
Status: DISCONTINUED | OUTPATIENT
Start: 2022-11-09 | End: 2022-11-18 | Stop reason: HOSPADM

## 2022-11-09 RX ORDER — PANTOPRAZOLE SODIUM 40 MG/1
40 TABLET, DELAYED RELEASE ORAL
Status: DISCONTINUED | OUTPATIENT
Start: 2022-11-10 | End: 2022-11-18 | Stop reason: HOSPADM

## 2022-11-09 RX ORDER — LORAZEPAM 0.5 MG/1
0.5 TABLET ORAL DAILY
Status: DISCONTINUED | OUTPATIENT
Start: 2022-11-10 | End: 2022-11-09

## 2022-11-09 RX ORDER — HYDROXYZINE 50 MG/1
50 TABLET, FILM COATED ORAL
Status: DISCONTINUED | OUTPATIENT
Start: 2022-11-09 | End: 2022-11-18 | Stop reason: HOSPADM

## 2022-11-09 RX ORDER — CLOZAPINE 100 MG/1
100 TABLET ORAL
Status: DISCONTINUED | OUTPATIENT
Start: 2022-11-09 | End: 2022-11-09

## 2022-11-09 RX ORDER — LORAZEPAM 1 MG/1
1 TABLET ORAL
Status: DISCONTINUED | OUTPATIENT
Start: 2022-11-09 | End: 2022-11-09

## 2022-11-09 RX ORDER — LORAZEPAM 0.5 MG/1
0.5 TABLET ORAL EVERY 8 HOURS PRN
Status: DISCONTINUED | OUTPATIENT
Start: 2022-11-09 | End: 2022-11-18 | Stop reason: HOSPADM

## 2022-11-09 RX ORDER — BENZTROPINE MESYLATE 1 MG/1
1 TABLET ORAL 2 TIMES DAILY
Status: DISCONTINUED | OUTPATIENT
Start: 2022-11-09 | End: 2022-11-12

## 2022-11-09 RX ORDER — METHOCARBAMOL 500 MG/1
500 TABLET, FILM COATED ORAL EVERY 6 HOURS PRN
Status: DISCONTINUED | OUTPATIENT
Start: 2022-11-09 | End: 2022-11-18 | Stop reason: HOSPADM

## 2022-11-09 RX ORDER — VENLAFAXINE HYDROCHLORIDE 37.5 MG/1
37.5 CAPSULE, EXTENDED RELEASE ORAL DAILY
Status: DISCONTINUED | OUTPATIENT
Start: 2022-11-10 | End: 2022-11-13

## 2022-11-09 RX ORDER — FLUTICASONE PROPIONATE 50 MCG
1 SPRAY, SUSPENSION (ML) NASAL 2 TIMES DAILY
Status: DISCONTINUED | OUTPATIENT
Start: 2022-11-09 | End: 2022-11-18 | Stop reason: HOSPADM

## 2022-11-09 RX ORDER — OLANZAPINE 5 MG/1
5 TABLET ORAL 3 TIMES DAILY PRN
Status: DISCONTINUED | OUTPATIENT
Start: 2022-11-09 | End: 2022-11-11

## 2022-11-09 RX ORDER — CLOZAPINE 25 MG/1
50 TABLET ORAL DAILY
Status: COMPLETED | OUTPATIENT
Start: 2022-11-10 | End: 2022-11-10

## 2022-11-09 RX ORDER — LORAZEPAM 1 MG/1
1 TABLET ORAL EVERY 8 HOURS PRN
Status: DISCONTINUED | OUTPATIENT
Start: 2022-11-09 | End: 2022-11-18 | Stop reason: HOSPADM

## 2022-11-09 RX ADMIN — DICYCLOMINE HYDROCHLORIDE 20 MG: 20 TABLET ORAL at 21:15

## 2022-11-09 RX ADMIN — METHOCARBAMOL 500 MG: 500 TABLET ORAL at 21:16

## 2022-11-09 RX ADMIN — FENOFIBRATE 145 MG: 145 TABLET, COATED ORAL at 12:34

## 2022-11-09 RX ADMIN — RISPERIDONE 1 MG: 1 TABLET ORAL at 22:41

## 2022-11-09 RX ADMIN — OLANZAPINE 5 MG: 5 TABLET, FILM COATED ORAL at 16:16

## 2022-11-09 RX ADMIN — WHITE PETROLATUM 57.7 %-MINERAL OIL 31.9 % EYE OINTMENT 1 APPLICATION: at 21:15

## 2022-11-09 RX ADMIN — NICOTINE 1 PATCH: 21 PATCH, EXTENDED RELEASE TRANSDERMAL at 08:18

## 2022-11-09 RX ADMIN — DOCUSATE SODIUM 100 MG: 100 CAPSULE, LIQUID FILLED ORAL at 08:18

## 2022-11-09 RX ADMIN — FLUTICASONE PROPIONATE 1 SPRAY: 50 SPRAY, METERED NASAL at 12:34

## 2022-11-09 RX ADMIN — BENZTROPINE MESYLATE 1 MG: 1 TABLET ORAL at 11:29

## 2022-11-09 RX ADMIN — FLUTICASONE PROPIONATE 1 SPRAY: 50 SPRAY, METERED NASAL at 17:27

## 2022-11-09 RX ADMIN — BENZTROPINE MESYLATE 1 MG: 1 TABLET ORAL at 17:27

## 2022-11-09 RX ADMIN — TRAZODONE HYDROCHLORIDE 50 MG: 50 TABLET ORAL at 22:41

## 2022-11-09 RX ADMIN — PANTOPRAZOLE SODIUM 40 MG: 40 TABLET, DELAYED RELEASE ORAL at 08:18

## 2022-11-09 RX ADMIN — LORAZEPAM 0.5 MG: 0.5 TABLET ORAL at 13:53

## 2022-11-09 RX ADMIN — LORAZEPAM 1 MG: 1 TABLET ORAL at 21:15

## 2022-11-09 RX ADMIN — CLOZAPINE 100 MG: 100 TABLET ORAL at 11:29

## 2022-11-09 RX ADMIN — LORATADINE 10 MG: 10 TABLET ORAL at 12:34

## 2022-11-09 NOTE — NURSING NOTE
Patient assessed  In bed holding abdomin, states she has a history of ulcer and needs something  New orders obtained  Bentyl 20 mg and Mylanta 30 mg given PO at 2056 with good effect  Given ginger-brody and [place] to assist with GI upset  Complained of bilateral spasms, Flexeril 10 mg for complaint  Tylenol 975 mg given PO for # 10 bilateral leg pain at 2239  Agitation Behavior Scale 36  Patient stated she needs "something" for her feelings  Risperdal 1 mg given Po at 2239  Effective at present for relief  Anxiety 10/10 and depression 5/5  Denies current suicidal ideations  Continues to have minimal hallucinations (command)  Pleasant during interactions  Withdrawn to room tonight  Maintained on q 7 minute safety checks

## 2022-11-09 NOTE — NURSING NOTE
Patient visible on the unit  Patient cooperative, paranoid, and easily irritated  Patient presents depressed and internally preoccupied  Med compliant  Through the am hours patient repeatedly yelled out and was screaming in her room where she was the only person present  Patient denies SI/HI, AH/VH, and pain  Patient states that she has moderate anxiety and depression  Patient saw the doctor this am and addressed her concerns with the doctor  Patient c/o anxiety and requested Ativan  PRN Ativan 0 5mg given at 1353  Adonay Linea of 16  No complaints or concerns voiced by this patient at this time  Will continue to monitor  Q7min checks are in progress

## 2022-11-09 NOTE — NURSING NOTE
Dr Dane Dasilva aware of [facility] pharmacist Jody's recommendations regarding Clozaril administration  No new orders at this time

## 2022-11-09 NOTE — PROGRESS NOTES
Progress Note - Qi Savage 47 y o  female MRN: [id number]    Unit/Bed#[facility]-01 Encounter: [id number]        Subjective:   Patient seen and examined at bedside after reviewing the chart and discussing the case with the caring staff  Patient reports Flexeril does not work for her muscle pain  She also complains of nasal congestion and postnasal drip for the past few months  She denies any other complaints at this time  Physical Exam   Vitals: Blood pressure 129/78, pulse 77, temperature (!) 97 °F (36 1 °C), temperature source Temporal, resp  rate 16, height 5' 1" (1 549 m), weight 68 9 kg (151 lb 12 8 oz), SpO2 94 %, not currently breastfeeding  ,Body mass index is 28 68 kg/m²  Constitutional: Patient in no acute distress  HEENT: PERR, EOMI, MMM, rhinorrhea  Cardiovascular: Normal rate and regular rhythm  Pulmonary/Chest: Effort normal and breath sounds normal    Abdomen:  Non distended  Assessment/Plan:  Qi Savage is a(n) 47y o  year old female with schizoaffective disorder      1  GERD/abd cramping  Continue Protonix 40 mg daily, Mylanta and Bentyl as needed  2  Tobacco abuse  Patient is on nicotine transdermal patch 21 mg/24 hr, nicotine gum as needed  3  Hyperlipidemia  Continue fenofibrate 145 mg daily  4  Constipation  Patient has been put on Colace daily, MiraLax as needed  5  Asthma  Patient may use albuterol inhaler as needed  6  DDD/OA  Patient may take Tylenol and use Voltaren gel as needed  Discontinue Flexeril and trial Robaxin  7  Nasal congestion  Zyrtec not available in hospital   Patient placed on Claritin 10 mg daily and Flonase nasal spray twice daily  The patient was discussed with Dr Pablo Morillo and he is in agreement with the above note

## 2022-11-09 NOTE — PROGRESS NOTES
11/09/22 0730   Activity/Group Checklist   Group Community meeting   Attendance Attended   Attendance Duration (min) 31-45  (Pt left grp early)   Interactions Interacted appropriately   Affect/Mood Appropriate   Goals Achieved Identified feelings; Able to listen to others; Able to engage in interactions; Able to self-disclose; Able to recieve feedback

## 2022-11-09 NOTE — SOCIAL WORK
SW returned call to Luis Fonseca pt's [facility] ACT team  to discuss pt's admission  Call was not answered

## 2022-11-09 NOTE — PROGRESS NOTES
This writer was able to obtained all Pt's lab work  All lab work was given to the supervisor to be sent out to the lab

## 2022-11-09 NOTE — NURSING NOTE
Patient requesting PRN Zyprexa for mild agitation, speech loud  Patient upset because she believes people are hypnotizing her  "I came here to get better, not to have my life ruined"  Support provided, Zyprexa given as ordered  Remains on 7" checks for safety and behaviors

## 2022-11-09 NOTE — SOCIAL WORK
SW obtained DEYA for [facility]  Send FAX to [facility] (fax [phone]) with request for medication list requested by provider

## 2022-11-09 NOTE — PLAN OF CARE
Problem: Ineffective Coping  Goal: Participates in unit activities  Description: Interventions:  - Provide therapeutic environment   - Provide required programming   - Redirect inappropriate behaviors   Outcome: Not Progressing   Patient adjusting to the unit and trying to sleep due to nightmares last night and needing medication

## 2022-11-09 NOTE — TREATMENT PLAN
TREATMENT PLAN REVIEW - [id number] Adonis 47 y o  1970 female MRN: [id number]    [facility]  Room / Bed: Chris Sharif [facility] Encounter: [id number]          Admit Date/Time:  11/8/2022 12:55 PM    Treatment Team: Attending Provider: Bruce Gamboa MD; Patient Care Technician: Georgette Cabral; Patient Care Assistant: Padmini Bianchi; Recreational Therapist: Camille Quintana; Certified Nursing Assistant: Tyson Coronel; Patient Care Assistant: Jason Metz; Care Manager: Byrd Lanes, RN; Patient Care Assistant: Mirza Allen; Patient Care Assistant: Wendy Whitfield; Registered Nurse: Charlene Villela RN; Licensed Practical Nurse: Chuy Salguero LPN    Diagnosis: Principal Problem:    Schizoaffective disorder, bipolar type ([facility] )  Active Problems:    LYNN (generalized anxiety disorder)    Degenerative disc disease, lumbar    Post-traumatic stress disorder, chronic    Mild intermittent asthma without complication    Tobacco abuse    Gastroesophageal reflux disease    Emphysema lung ([facility] )      Patient Strengths/Assets: compliant with medication, negotiates basic needs, patient is on a voluntary commitment    Patient Barriers/Limitations: limited family ties, poor insight, self-care deficit    Short Term Goals: decrease in depressive symptoms, decrease in anxiety symptoms, decrease in psychotic symptoms, decrease in suicidal thoughts, ability to stay safe on the unit, improvement in reality testing, improvement in reasoning ability, improvement in self care, sleep improvement, improvement in appetite, mood stabilization, increase in group attendance, increase in socialization with peers on the unit, acceptance of need for psychiatric treatment, acceptance of psychiatric medications    Long Term Goals: improvement in depression, improvement in anxiety, stabilization of mood, free of suicidal thoughts, improvement in reasoning ability, improved insight, acceptance of need for psychiatric medications, acceptance of need for psychiatric treatment, adequate self care, adequate sleep, adequate appetite, adequate oral intake, appropriate interaction with peers    Progress Towards Goals: starting psychiatric medications as prescribed    Recommended Treatment: medication management, patient medication education, group therapy, milieu therapy, continued Behavioral Health psychiatric evaluation/assessment process, medical follow up with medical team    Treatment Frequency: daily medication monitoring, group and milieu therapy daily, monitoring through interdisciplinary rounds, monitoring through weekly patient care conferences    Expected Discharge Date: 10 midnights     Discharge Plan: will be determined    Treatment Plan Created/Updated By: Alex Chicas MD

## 2022-11-09 NOTE — PROGRESS NOTES
11/09/22 1330   Activity/Group Checklist   Group   (Self Care and Art Therapy Processing)   Attendance Attended   Attendance Duration (min) Greater than 60   Interactions Interacted appropriately   Affect/Mood Appropriate   Goals Achieved Identified feelings; Discussed coping strategies; Able to listen to others; Able to engage in interactions; Able to reflect/comment on own behavior;Able to manage/cope with feelings; Able to self-disclose

## 2022-11-09 NOTE — PLAN OF CARE
Problem: Ineffective Coping  Goal: Identifies ineffective coping skills  Outcome: Progressing     Problem: Risk for Self Injury/Neglect  Goal: Refrain from harming self  Description: Interventions:  - Monitor patient closely, per order  - Develop a trusting relationship  - Supervise medication ingestion, monitor effects and side effects   Outcome: Progressing     Problem: Depression  Goal: Verbalize thoughts and feelings  Description: Interventions:  - Assess and re-assess patient's level of risk   - Engage patient in 1:1 interactions, daily, for a minimum of 15 minutes   - Encourage patient to express feelings, fears, frustrations, hopes   Outcome: Progressing  Goal: Complete daily ADLs, including personal hygiene independently, as able  Description: Interventions:  - Observe, teach, and assist patient with ADLS  -  Monitor and promote a balance of rest/activity, with adequate nutrition and elimination   Outcome: Progressing     Problem: Anxiety  Goal: Anxiety is at manageable level  Description: Interventions:  - Assess and monitor patient's anxiety level  - Monitor for signs and symptoms (heart palpitations, chest pain, shortness of breath, headaches, nausea, feeling jumpy, restlessness, irritable, apprehensive)  - Collaborate with interdisciplinary team and initiate plan and interventions as ordered    - Critz patient to unit/surroundings  - Explain treatment plan  - Encourage participation in care  - Encourage verbalization of concerns/fears  - Identify coping mechanisms  - Assist in developing anxiety-reducing skills  - Administer/offer alternative therapies  - Limit or eliminate stimulants  Outcome: Progressing

## 2022-11-09 NOTE — NURSING NOTE
No GI upset or agitation overnight  No noted suicidal ideations or homicidal behaviors  Fluids at bedside to promote hydration  No aspiration risks noted  Patient observed sleeping during most q 7 minute safety checks  No observable distress or changes in medical condition  No complaints of pain  Will continue to monitor
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
## 2022-11-09 NOTE — PROGRESS NOTES
11/09/22 1449   Team Meeting   Meeting Type Tx Team Meeting   Initial Conference Date 11/09/22   Next Conference Date 12/09/22   Team Members Present   Team Members Present Physician;Nurse;   Physician Team Member Dr Gisell Diaz Team Member Jadiel Quintana RN   Care Management Team Member Fausto Alicea RN   Patient/Family Present   Patient Present Yes   Patient's Family Present No     Treatment Team met with patient to review the Treatment Plan  Goals, Objectives, Strengths, Limitations and Interventions reviewed  Patient expressed understanding and agreement and signed the Treatment Plan document

## 2022-11-09 NOTE — PROGRESS NOTES
11/09/22   Team Meeting   Meeting Type Daily Rounds   Team Members Present   Team Members Present Physician;Nurse;; Occupational Therapist   Physician Team Member Dr Maria Luisa Gibson MD   Nursing Team Member Shannan Wise RN; Yesi Hung RN, [facility]   Social Work Team Member [id number]   OT Team Member Silver Seip   Patient/Family Present   Patient Present No   Patient's Family Present No     GI upset last night  Pt yelling last night; prn given  Was effective  Pt experienced nightmares  Pt has ACT Team [facility]

## 2022-11-09 NOTE — PLAN OF CARE
Problem: Ineffective Coping  Goal: Cooperates with admission process  Description: Interventions:   - Complete admission process  Outcome: Progressing  Goal: Identifies ineffective coping skills  Outcome: Progressing  Goal: Identifies healthy coping skills  Outcome: Progressing  Goal: Demonstrates healthy coping skills  Outcome: Progressing  Goal: Participates in unit activities  Description: Interventions:  - Provide therapeutic environment   - Provide required programming   - Redirect inappropriate behaviors   Outcome: Progressing  Goal: Patient/Family participate in treatment and DC plans  Description: Interventions:  - Provide therapeutic environment  Outcome: Progressing  Goal: Patient/Family verbalizes awareness of resources  Outcome: Progressing  Goal: Understands least restrictive measures  Description: Interventions:  - Utilize least restrictive behavior  Outcome: Progressing  Goal: Free from restraint events  Description: - Utilize least restrictive measures   - Provide behavioral interventions   - Redirect inappropriate behaviors   Outcome: Progressing     Problem: Risk for Self Injury/Neglect  Goal: Treatment Goal: Remain safe during length of stay, learn and adopt new coping skills, and be free of self-injurious ideation, impulses and acts at the time of discharge  Outcome: Progressing  Goal: Verbalize thoughts and feelings  Description: Interventions:  - Assess and re-assess patient's lethality and potential for self-injury  - Engage patient in 1:1 interactions, daily, for a minimum of 15 minutes  - Encourage patient to express feelings, fears, frustrations, hopes  - Establish rapport/trust with patient   Outcome: Progressing  Goal: Refrain from harming self  Description: Interventions:  - Monitor patient closely, per order  - Develop a trusting relationship  - Supervise medication ingestion, monitor effects and side effects   Outcome: Progressing  Goal: Attend and participate in unit activities, including therapeutic, recreational, and educational groups  Description: Interventions:  - Provide therapeutic and educational activities daily, encourage attendance and participation, and document same in the medical record  - Obtain collateral information, encourage visitation and family involvement in care   Outcome: Progressing  Goal: Recognize maladaptive responses and adopt new coping mechanisms  Outcome: Progressing  Goal: Complete daily ADLs, including personal hygiene independently, as able  Description: Interventions:  - Observe, teach, and assist patient with ADLS  - Monitor and promote a balance of rest/activity, with adequate nutrition and elimination  Outcome: Progressing     Problem: Depression  Goal: Treatment Goal: Demonstrate behavioral control of depressive symptoms, verbalize feelings of improved mood/affect, and adopt new coping skills prior to discharge  Outcome: Progressing  Goal: Verbalize thoughts and feelings  Description: Interventions:  - Assess and re-assess patient's level of risk   - Engage patient in 1:1 interactions, daily, for a minimum of 15 minutes   - Encourage patient to express feelings, fears, frustrations, hopes   Outcome: Progressing  Goal: Refrain from harming self  Description: Interventions:  - Monitor patient closely, per order   - Supervise medication ingestion, monitor effects and side effects   Outcome: Progressing  Goal: Refrain from isolation  Description: Interventions:  - Develop a trusting relationship   - Encourage socialization   Outcome: Progressing  Goal: Refrain from self-neglect  Outcome: Progressing  Goal: Attend and participate in unit activities, including therapeutic, recreational, and educational groups  Description: Interventions:  - Provide therapeutic and educational activities daily, encourage attendance and participation, and document same in the medical record   Outcome: Progressing  Goal: Complete daily ADLs, including personal hygiene independently, as able  Description: Interventions:  - Observe, teach, and assist patient with ADLS  -  Monitor and promote a balance of rest/activity, with adequate nutrition and elimination   Outcome: Progressing     Problem: Anxiety  Goal: Anxiety is at manageable level  Description: Interventions:  - Assess and monitor patient's anxiety level  - Monitor for signs and symptoms (heart palpitations, chest pain, shortness of breath, headaches, nausea, feeling jumpy, restlessness, irritable, apprehensive)  - Collaborate with interdisciplinary team and initiate plan and interventions as ordered    - Le[place] patient to unit/surroundings  - Explain treatment plan  - Encourage participation in care  - Encourage verbalization of concerns/fears  - Identify coping mechanisms  - Assist in developing anxiety-reducing skills  - Administer/offer alternative therapies  - Limit or eliminate stimulants  Outcome: Progressing

## 2022-11-10 RX ORDER — MELATONIN
1000 DAILY
Status: DISCONTINUED | OUTPATIENT
Start: 2022-11-11 | End: 2022-11-18 | Stop reason: HOSPADM

## 2022-11-10 RX ORDER — CLOZAPINE 25 MG/1
75 TABLET ORAL 2 TIMES DAILY
Status: COMPLETED | OUTPATIENT
Start: 2022-11-11 | End: 2022-11-11

## 2022-11-10 RX ADMIN — OLANZAPINE 5 MG: 5 TABLET, FILM COATED ORAL at 15:00

## 2022-11-10 RX ADMIN — PANTOPRAZOLE SODIUM 40 MG: 40 TABLET, DELAYED RELEASE ORAL at 05:24

## 2022-11-10 RX ADMIN — BENZTROPINE MESYLATE 1 MG: 1 TABLET ORAL at 08:47

## 2022-11-10 RX ADMIN — FLUTICASONE PROPIONATE 1 SPRAY: 50 SPRAY, METERED NASAL at 16:59

## 2022-11-10 RX ADMIN — LORAZEPAM 1 MG: 1 TABLET ORAL at 21:58

## 2022-11-10 RX ADMIN — CLOZAPINE 50 MG: 25 TABLET ORAL at 08:47

## 2022-11-10 RX ADMIN — FLUTICASONE PROPIONATE 1 SPRAY: 50 SPRAY, METERED NASAL at 16:58

## 2022-11-10 RX ADMIN — BENZTROPINE MESYLATE 1 MG: 1 TABLET ORAL at 16:59

## 2022-11-10 RX ADMIN — NICOTINE 1 PATCH: 21 PATCH, EXTENDED RELEASE TRANSDERMAL at 08:47

## 2022-11-10 RX ADMIN — LORAZEPAM 0.5 MG: 0.5 TABLET ORAL at 09:58

## 2022-11-10 RX ADMIN — Medication 3 MG: at 20:39

## 2022-11-10 RX ADMIN — CLOZAPINE 50 MG: 25 TABLET ORAL at 20:39

## 2022-11-10 RX ADMIN — DOCUSATE SODIUM 100 MG: 100 CAPSULE, LIQUID FILLED ORAL at 08:47

## 2022-11-10 RX ADMIN — LORATADINE 10 MG: 10 TABLET ORAL at 08:47

## 2022-11-10 RX ADMIN — VENLAFAXINE HYDROCHLORIDE 37.5 MG: 37.5 CAPSULE, EXTENDED RELEASE ORAL at 08:47

## 2022-11-10 RX ADMIN — FENOFIBRATE 145 MG: 145 TABLET, COATED ORAL at 08:47

## 2022-11-10 RX ADMIN — FLUTICASONE PROPIONATE 1 SPRAY: 50 SPRAY, METERED NASAL at 08:46

## 2022-11-10 NOTE — PROGRESS NOTES
Progress Note - Colonel Suarez 47 y o  female MRN: [id number]    Unit/Bed#[facility] 204-01 Encounter: [id number]        Subjective:   Patient seen and examined at bedside after reviewing the chart and discussing the case with the caring staff  Patient has no acute complaints  Vitamin-D level on 11/09/2022 low at 25 5 ng/mL  Physical Exam   Vitals: Blood pressure 98/60, pulse 77, temperature 97 6 °F (36 4 °C), temperature source Temporal, resp  rate 18, height 5' 1" (1 549 m), weight 68 9 kg (151 lb 12 8 oz), SpO2 91 %, not currently breastfeeding  ,Body mass index is 28 68 kg/m²  Constitutional: Patient in no acute distress  HEENT: PERR, EOMI, MMM, rhinorrhea  Cardiovascular: Normal rate and regular rhythm  Pulmonary/Chest: Effort normal and breath sounds normal    Abdomen:  Non distended  Assessment/Plan:  Colonel Suarez is a(n) 47y o  year old female with schizoaffective disorder      1  GERD/abd cramping  Continue Protonix 40 mg daily, Mylanta and Bentyl as needed  2  Tobacco abuse  Patient is on nicotine transdermal patch 21 mg/24 hr, nicotine gum as needed  3  Hyperlipidemia  Continue fenofibrate 145 mg daily  4  Constipation  Patient has been put on Colace daily, MiraLax as needed  5  Asthma  Patient may use albuterol inhaler as needed  6  DDD/OA  Patient may take Tylenol and use Voltaren gel as needed  Discontinue Flexeril and trial Robaxin  7  Nasal congestion  Zyrtec not available in hospital   Patient placed on Claritin 10 mg daily and Flonase nasal spray twice daily  8  Vitamin-D deficiency  Patient started on vitamin D3 1000 units daily  The patient was discussed with Dr Estuardo Friedman and he is in agreement with the above note

## 2022-11-10 NOTE — PROGRESS NOTES
11/10/22 1300   Activity/Group Checklist   Group   (Pet Therapy and Art Therapy Collaboration)   Attendance Attended   Attendance Duration (min) 16-30   Interactions Interacted appropriately   Affect/Mood Appropriate;Bright;Calm   Goals Achieved Identified feelings; Discussed coping strategies; Able to listen to others; Able to engage in interactions; Able to reflect/comment on own behavior;Able to manage/cope with feelings

## 2022-11-10 NOTE — SOCIAL WORK
SW placed call to Anirudh Moody pt's ACT Team cm  Call was unanswered and SETH was unable to leave a voice message

## 2022-11-10 NOTE — CASE MANAGEMENT
CM met with patient for status update  Patient observed in her room, lying upright in bed, receptive to contact  Mood more stable, affect brightened, smiling spontaneously and appropriately throughout interaction  Stated she was in need of adjustment related to her antidepressant medication prior to admission due to increasingly depressed mood  She believes that her condition worsened due to length of time in the ED without Clozaril  Stated she is beginning to feel improved with resumption of that medication  Endorsed decreased AH; improved clarity of thought, decreased anxiety/depression  Denied thoughts of self harm  Positive gains reinforced  Will f/u for ongoing monitoring/ support/ assistance in meeting needs presented

## 2022-11-10 NOTE — PROGRESS NOTES
11/10/22   Team Meeting   Meeting Type Daily Rounds   Team Members Present   Team Members Present Physician;Nurse;; Occupational Therapist   Physician Team Member Dr Carmita Arias MD   Nursing Team Member [facility] RN; [facility] RN, [facility]; Efe Parks RN, [facility]   Social Work Team Member [id number] Team Member Allyson Green   Patient/Family Present   Patient Present No   Patient's Family Present No     Last night pt disorganized, broken sleep  Nightmares  Agitated with room mate, cursing, yelling  Some paranoia  Prn's given 2115 and 0931  Pt reported feeling she was 'hypnotized' while sleeping

## 2022-11-10 NOTE — NURSING NOTE
Patient is visible for meals and group  Affect is flat upon interaction and mood is depressed  Thought content is disorganized  She states she did not sleep well last night due to nightmares that Risperdal causes her  Last time she had hallucinations was last night  "I'm vincent normal but out in left field " "Anxiety isn't too bad" Inquired about ativan; explained prns  "I'm hypnotized to eat and they act like they care " Pleasant, calm and compliant during interaction  Prn ativan 0 5 mg requested/given at 0953; effective per patient  Patient noted to be yelling and cursing in her room this morning  Q 7 min safety checks maintained  Will continue to monitor for behaviors, symptoms and provide support

## 2022-11-10 NOTE — PROGRESS NOTES
Progress Note - Behavioral Health   Tianna Vásquez 47 y o  female MRN: [id number]  [facility] Encounter: [id number]    Patient was seen today for continuation of care, records reviewed and patient was discussed with the morning case review team   Per staff, the patient had difficulty sleeping and was agitated overnight  She received 1 mg p o  of Risperdal at 2241 and 50 mg p o  trazodone 2241 which was effective    Shelli Gutiérrez was seen today for psychiatric follow-up  On assessment today, Shelli Gutiérrez was in her room  She remains disorganized and paranoid  She has ongoing persecutory delusions  She reports that people at her group home were performing hypnosis on her  She believes the hypnosis is a form of mental telepathy were people can put thoughts and voices into her brain  She believes people can do hypnosis here, however, she feels safe on the unit  She endorses ongoing auditory hallucinations and denies any visual hallucinations  She has ongoing depression symptoms such as hopelessness and helplessness  She notes some improvement in her depression and anxiety since arrival to the unit  She endorses ongoing fleeting suicidal thoughts however she contracts for safety on the unit  She reports sleeping well last night  She states her appetite is good but she is trying to lose weight  She believes that she was force fed at her group home through hypnosis  Cady Meeks is agreeable to slowly increasing Clozaril to an effective dose for her ongoing psychosis  She reports using meditation and spirituality is current coping skills  She does report a long history of drug and alcohol use  However she states she has been clean and sober for a few months  Shelli Gutiérrez was overall cooperative, pleasant, and calm during the interview  Shelliradha Gutiérrez denies HI/VH, and does not appear overtly manic    Shelli Gutiérrez remains adherent to her current psychotropic medication regimen and denies any side effects from medications, as well as none noted on exam     Vitals:  Vitals:    11/10/22 0801   BP: 98/60   Pulse: 77   Resp: 18   Temp: 97 6 °F (36 4 °C)   SpO2: 91%       Laboratory Results:    I have personally reviewed all pertinent laboratory/tests results  Most Recent Labs:   Lab Results   Component Value Date    WBC 8 32 11/09/2022    RBC 4 95 11/09/2022    HGB 14 1 11/09/2022    HCT 44 6 11/09/2022     11/09/2022    RDW 15 2 (H) 11/09/2022    NEUTROABS 5 98 11/09/2022    SODIUM 138 11/09/2022    K 4 0 11/09/2022     11/09/2022    CO2 29 11/09/2022    BUN 7 11/09/2022    CREATININE 0 64 11/09/2022    GLUC 95 11/09/2022    GLUF 129 (H) 04/25/2022    CALCIUM 9 4 11/09/2022    AST 13 11/07/2022    ALT 17 11/07/2022    ALKPHOS 77 11/07/2022    TP 7 4 11/07/2022    ALB 4 3 11/07/2022    TBILI 0 28 11/07/2022    CHOLESTEROL 238 (H) 11/09/2022    HDL 47 (L) 11/09/2022    TRIG 263 (H) 11/09/2022    LDLCALC 138 (H) 11/09/2022    NONHDLC 191 11/09/2022    AMMONIA 28 10/27/2017    [id number] 2 050 11/07/2022    FREET4 0 89 03/24/2016    PREGUR Negative 11/07/2022    PREGSERUM Negative 10/21/2019    HCG <2 00 04/10/2014    RPR Non-Reactive 11/09/2022    HGBA1C 6 1 (H) 06/18/2022     06/18/2022       Psychiatric Review of Systems:  Behavior over the last 24 hours:  improved     Sleep: difficult falling asleep  Appetite: adequate  Medication side effects: denies and none noted on exam   ROS: no complaints, denies shortness of breath or chest pain and all other systems are negative for acute changes    Mental Status Evaluation:  Appearance:  casually dressed, adequate grooming   Behavior:  pleasant, calm   Speech:  fluent, clear, increased rate   Mood:  anxious, labile   Affect:  constricted   Thought Process:  disorganized, illogical, tangential, negative thinking   Thought Content:  persecutory delusions, paranoid ideation, no overt paranoia noted on exam   Perceptual Disturbances: auditory hallucinations without commands and with negative comments   Risk Potential: Suicidal ideation - Yes, fleeting suicidal thoughts  Homicidal ideation - None at present  Potential for aggression - No   Memory:  recent memory mildly impaired, remote memory mildly impaired   Sensorium  person, place, time/date and situation      Consciousness:  alert and awake   Attention: attention span and concentration appear shorter than expected for age   Insight:  limited   Judgment: limited   Gait/Station: normal gait/station   Motor Activity: no abnormal movements   Progress Toward Goals:   Dana Dupree is progressing towards goals of inpatient psychiatric treatment by continued medication compliance and is attending therapeutic modalities on the milieu  However, the patient continues to require inpatient psychiatric hospitalization for continued medication management and titration to optimize symptom reduction, improve sleep hygiene, and demonstrate adequate self-care  Assessment/Plan   Principal Problem:    Schizoaffective disorder, bipolar type (HCC)  Active Problems:    LYNN (generalized anxiety disorder)    Degenerative disc disease, lumbar    Post-traumatic stress disorder, chronic    Mild intermittent asthma without complication    Tobacco abuse    Gastroesophageal reflux disease    Emphysema lung ([facility] [address] )      Recommended Treatment: Treatment plan and medication changes discussed and per the attending physician the plan is: 1  Continue with group therapy, milieu therapy and occupational therapy  2  Behavioral Health checks every 7 minutes  3  Continue frequent safety checks and vitals per unit protocol  4  Continue with SLIM medical management as indicated  5  Continue with current medication regimen  Clozaril 50mg BID today and increase to 75mg PO tomorrow  Effexor XR 37 5mg daily  Cogentin 1mg BID  Melatonin 3mg at HS  6  Will review labs in the a m    7 Disposition Planning: Discharge planning and efforts remain ongoing    Behavioral Health Medications: all current active meds have been reviewed and planned medication changes: increase clozaril to 75mg PO BID starting tomorrow    Current Facility-Administered Medications   Medication Dose Route Frequency Provider Last Rate   • acetaminophen  650 mg Oral Q4H PRN Payton Conrad PA-C     • acetaminophen  650 mg Oral Q4H PRN Payton Conrad PA-C     • acetaminophen  975 mg Oral Q6H PRN Payton Conrad PA-C     • albuterol  2 puff Inhalation Q6H PRN Johanne Whatley MD     • aluminum-magnesium hydroxide-simethicone  30 mL Oral Q4H PRN Vira Cleaning MD     • artificial tear   Both Eyes HS PRN Payton Conrad PA-C     • benztropine  1 mg Oral BID Hansel Zamora MD     • [START ON 11/11/2022] cholecalciferol  1,000 Units Oral Daily Payton Conrad PA-C     • cloZAPine  50 mg Oral HS JARED Guerrero     • [START ON 11/11/2022] cloZAPine  75 mg Oral BID JARED Richmond     • Diclofenac Sodium  2 g Topical 4x Daily PRN Johanne Whatley MD     • dicyclomine  20 mg Oral 4x Daily PRN Payton Conrad PA-C     • docusate sodium  100 mg Oral Daily Johanne Whatley MD     • fenofibrate  145 mg Oral Daily Payton Conrad PA-C     • fluticasone  1 spray Each Nare BID Payton Conrad PA-C     • haloperidol lactate  5 mg Intramuscular Q4H PRN Max 4/day Vira Cleaning MD     • hydrOXYzine HCL  50 mg Oral Q6H PRN Max 4/day Hansel Zamora MD     • loratadine  10 mg Oral Daily Payton Conrad PA-C     • LORazepam  0 5 mg Oral Q8H PRN Hansel Zamora MD     • LORazepam  1 mg Oral Q8H PRN Hansel Zamora MD     • melatonin  3 mg Oral HS Vira Cleaning MD     • methocarbamol  500 mg Oral Q6H PRN Payton Conrad PA-C     • nicotine  1 patch Transdermal Daily Payton Conrad PA-C     • nicotine polacrilex  4 mg Oral Q2H PRN Payton Conrad PA-C     • OLANZapine  5 mg Oral TID PRN Hansel Zamora MD     • pantoprazole  40 mg Oral Early Morning Payton Conrad PA-C     • polyethylene glycol  17 g Oral Daily PRN Payton Conrad PA-C     • risperiDONE  1 mg Oral Q2H PRN Max 3/day Olena Boss MD     • traZODone  50 mg Oral HS PRN Deepak De Jesus MD     • venlafaxine  37 5 mg Oral Daily Deepak De Jesus MD         Risks / Benefits of Treatment:  Risks, benefits, and possible side effects of medications explained to patient and patient verbalizes understanding and agreement for treatment  Counseling / Coordination of Care:  Patient's progress reviewed with nursing staff  Medications, treatment progress and treatment plan reviewed with patient  Supportive counseling provided to the patient  Total floor/unit time spent today 25 minutes  Greater than 50% of total time was spent with the patient and / or family counseling and / or coordination of care  A description of the counseling / coordination of care: medication education, treatment plan, supportive therapy

## 2022-11-10 NOTE — NURSING NOTE
Patient states prn olanzapine 5 mg was effective in taking care of her psychotic episode but makes her feel intense as well but states she is fine at that time  No overt hallucinations noted  Will continue to monitor

## 2022-11-10 NOTE — PLAN OF CARE
Problem: Risk for Self Injury/Neglect  Goal: Treatment Goal: Remain safe during length of stay, learn and adopt new coping skills, and be free of self-injurious ideation, impulses and acts at the time of discharge  Outcome: Progressing  Goal: Refrain from harming self  Description: Interventions:  - Monitor patient closely, per order  - Develop a trusting relationship  - Supervise medication ingestion, monitor effects and side effects   Outcome: Progressing  Goal: Recognize maladaptive responses and adopt new coping mechanisms  Outcome: Progressing     Problem: Depression  Goal: Treatment Goal: Demonstrate behavioral control of depressive symptoms, verbalize feelings of improved mood/affect, and adopt new coping skills prior to discharge  Description: Patient to use strength of negotiating basic need to seek help to manage depressive symptoms    Outcome: Progressing  Goal: Verbalize thoughts and feelings  Description: Interventions:  - Assess and re-assess patient's level of risk   - Engage patient in 1:1 interactions, daily, for a minimum of 15 minutes   - Encourage patient to express feelings, fears, frustrations, hopes   Outcome: Progressing  Goal: Refrain from isolation  Description: Interventions:  - Develop a trusting relationship   - Encourage socialization   Outcome: Progressing  Goal: Refrain from self-neglect  Outcome: Progressing  Goal: Attend and participate in unit activities, including therapeutic, recreational, and educational groups  Description: Interventions:  - Provide therapeutic and educational activities daily, encourage attendance and participation, and document same in the medical record   Outcome: Progressing  Goal: Complete daily ADLs, including personal hygiene independently, as able  Description: Interventions:  - Observe, teach, and assist patient with ADLS  -  Monitor and promote a balance of rest/activity, with adequate nutrition and elimination   Outcome: Progressing     Problem: Anxiety  Goal: Anxiety is at manageable level  Description: Interventions:  - Assess and monitor patient's anxiety level  - Monitor for signs and symptoms (heart palpitations, chest pain, shortness of breath, headaches, nausea, feeling jumpy, restlessness, irritable, apprehensive)  - Collaborate with interdisciplinary team and initiate plan and interventions as ordered    - Aguirre patient to unit/surroundings  - Explain treatment plan  - Encourage participation in care  - Encourage verbalization of concerns/fears  - Identify coping mechanisms  - Assist in developing anxiety-reducing skills  - Administer/offer alternative therapies  - Limit or eliminate stimulants  Outcome: Progressing

## 2022-11-10 NOTE — NURSING NOTE
Risperdal 1 mg and Trazodone 50 mg PO at 2241 effective for sleep and calming patients agitation  No further yelling or paranoid ideations  Sleep observed as sound after 0015, no respiratory distress  Continues on q 7 minute safety checks  Clutter free environment continued to prevent falls  Fluids maintained at beside to promote hydration  Will continue to monitor
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
## 2022-11-10 NOTE — NURSING NOTE
Patient out to nurse's station  Upset about not sleeping, paranoid thinking people are saying she is "marked by the mob"  Voice loud, endorsing auditory hallucinations  Tangential   Requested and received Risperdal 1 mg and Trazodone 50 mg PO at 2241  Will monitor effectiveness

## 2022-11-10 NOTE — PROGRESS NOTES
11/10/22 7082   Activity/Group Checklist   Group Community meeting   Attendance Attended   Attendance Duration (min) 16-30  (Pt left grp early)   Interactions Interacted appropriately   Affect/Mood Appropriate   Goals Achieved Identified feelings; Able to listen to others; Able to engage in interactions; Able to self-disclose; Able to recieve feedback

## 2022-11-10 NOTE — NURSING NOTE
Compliant with medications and snack  Mostly in bedroom  Patient had argument with roommate, note yelling and screaming  Anxiety 10/10  Martines Scale 29  Ativan 1 mg given at 2115  Effective at present  Unable to move patient to new room due to no room availability  Extensive 1 to 1 provided by RN  Rated depression has high  Denies suicidal ideations or current hallucinations  Currently patient behavior is controlled  Maintained on q 7 minute safety checks

## 2022-11-10 NOTE — SOCIAL WORK
SETH sent FAX requesting alternative contact information for ACT RN, Jaylyn Kingsley, as SETH has been unsuccessful in reaching RN through agency number  SETH met briefly with pt  Presented as engaged, social and pleasant  Pt said she would like to consider going to an alternate group home and said ACT Team is aware of this  SW to continue to attempt to reach ACT

## 2022-11-10 NOTE — NURSING NOTE
Patient requested/received olanzapine 5 mg at 1500; pt states she feels a psychotic episode coming on with delusional thoughts and is tasting bland taste that isn't really there or unable to explain it  Will continue to monitor for side effects and or improvement

## 2022-11-11 RX ORDER — OLANZAPINE 2.5 MG/1
2.5 TABLET ORAL 3 TIMES DAILY PRN
Status: DISCONTINUED | OUTPATIENT
Start: 2022-11-11 | End: 2022-11-18 | Stop reason: HOSPADM

## 2022-11-11 RX ORDER — TRAZODONE HYDROCHLORIDE 50 MG/1
50 TABLET ORAL
Status: DISCONTINUED | OUTPATIENT
Start: 2022-11-11 | End: 2022-11-12

## 2022-11-11 RX ORDER — OLANZAPINE 5 MG/1
5 TABLET ORAL
Status: DISCONTINUED | OUTPATIENT
Start: 2022-11-11 | End: 2022-11-18 | Stop reason: HOSPADM

## 2022-11-11 RX ORDER — CLOZAPINE 100 MG/1
100 TABLET ORAL 2 TIMES DAILY
Status: DISCONTINUED | OUTPATIENT
Start: 2022-11-12 | End: 2022-11-14

## 2022-11-11 RX ADMIN — CLOZAPINE 75 MG: 25 TABLET ORAL at 09:00

## 2022-11-11 RX ADMIN — TRAZODONE HYDROCHLORIDE 50 MG: 50 TABLET ORAL at 21:10

## 2022-11-11 RX ADMIN — NICOTINE 1 PATCH: 21 PATCH, EXTENDED RELEASE TRANSDERMAL at 12:43

## 2022-11-11 RX ADMIN — VENLAFAXINE HYDROCHLORIDE 37.5 MG: 37.5 CAPSULE, EXTENDED RELEASE ORAL at 09:00

## 2022-11-11 RX ADMIN — METHOCARBAMOL 500 MG: 500 TABLET ORAL at 20:27

## 2022-11-11 RX ADMIN — BENZTROPINE MESYLATE 1 MG: 1 TABLET ORAL at 17:30

## 2022-11-11 RX ADMIN — CLOZAPINE 75 MG: 25 TABLET ORAL at 17:30

## 2022-11-11 RX ADMIN — LORATADINE 10 MG: 10 TABLET ORAL at 09:00

## 2022-11-11 RX ADMIN — WHITE PETROLATUM 57.7 %-MINERAL OIL 31.9 % EYE OINTMENT 1 APPLICATION: at 17:44

## 2022-11-11 RX ADMIN — FLUTICASONE PROPIONATE 1 SPRAY: 50 SPRAY, METERED NASAL at 17:31

## 2022-11-11 RX ADMIN — LORAZEPAM 1 MG: 1 TABLET ORAL at 20:27

## 2022-11-11 RX ADMIN — FENOFIBRATE 145 MG: 145 TABLET, COATED ORAL at 09:00

## 2022-11-11 RX ADMIN — DOCUSATE SODIUM 100 MG: 100 CAPSULE, LIQUID FILLED ORAL at 09:00

## 2022-11-11 RX ADMIN — FLUTICASONE PROPIONATE 1 SPRAY: 50 SPRAY, METERED NASAL at 09:01

## 2022-11-11 RX ADMIN — LORAZEPAM 1 MG: 1 TABLET ORAL at 09:50

## 2022-11-11 RX ADMIN — ALBUTEROL SULFATE 2 PUFF: 90 AEROSOL, METERED RESPIRATORY (INHALATION) at 17:45

## 2022-11-11 RX ADMIN — PANTOPRAZOLE SODIUM 40 MG: 40 TABLET, DELAYED RELEASE ORAL at 06:07

## 2022-11-11 RX ADMIN — CHOLECALCIFEROL TAB 25 MCG (1000 UNIT) 1000 UNITS: 25 TAB at 09:00

## 2022-11-11 RX ADMIN — BENZTROPINE MESYLATE 1 MG: 1 TABLET ORAL at 09:00

## 2022-11-11 NOTE — PROGRESS NOTES
11/11/22   Team Meeting   Meeting Type Daily Rounds   Team Members Present   Team Members Present Physician;Nurse;; Occupational Therapist   Physician Team Member Dr Faustino Malcolm MD   Nursing Team Member Atif JIMENES; Mariela Gunter RN, [facility]   Social Work Team Member [id number]   OT Team Member Kelsi Silverio   Patient/Family Present   Patient Present No   Patient's Family Present No     Pt internally preoccupied  Pt requested zyprexa 1700; was effective  Ativan 2158; was effective  Pt slept  Am was 'foggy'   Denied a/h, v/h

## 2022-11-11 NOTE — NURSING NOTE
No further complaints of anxiety overnight  Slept well during most q 7 minute safety checks  No respiratory distress or changes in medical condition  Sleep has been sound and undisturbed  No suicidal ideations noted  Safety maintained via clutter-free environment, ID band, and given non-skid socks  Will continue to monitor

## 2022-11-11 NOTE — SOCIAL WORK
SETH contacted pt's nurse to obtain clozapine lab result   SETH faxed result to RN at [facility] per his request

## 2022-11-11 NOTE — SOCIAL WORK
SETH placed call to ACT Team RN, Rk Ordoñez, at [phone] to discuss pt admission  RN said that when at baseline, pt experiences some paranoia and delusions  Stated she reports she is being 'hypnotized  RN said she is due this week for blood draw (is on a biweekly schedule currently)  Requested blood draw and result faxed to [facility] at [phone] (fax #)  SETH provided ACT Team RN with contact information for nurse's station and pt phones  RN said pt's bed is available for her to return to at [facility]

## 2022-11-11 NOTE — PROGRESS NOTES
Progress Note - Behavioral Health   Lisa Rowley 47 y o  female MRN: [id number]  Unit/Bed#[facility]e 204-01 Encounter: [id number]    Patient was seen today for continuation of care, records reviewed and patient was discussed with the morning case review team     Rosy Limon was seen today for psychiatric follow-up  On assessment today, Rosy Limon was seen in her room  She is calm, pleasant and cooperative  She does remain paranoid with persecutory delusions  However, today she does appear more open to the idea that her delusions may not be reality  She continues to believe people hypnotize her but she states "I'm note sure if the hypnosis might be a part of my mental illness"  Patient states she still has gustatory and tactile hallucinations  She occasionally appears to be internally distracted  Patient believes the increase in Clozaril is helping her psychosis  She reports that she is interested in ECT  She notes that even when her psychosis is under control she still feels suicidal and impulsive often  She had ECT about 6 years ago and she found this helped with her suicidal thoughts and depression  Patient reports Risperdal and melatonin give her nightmares  Will discontinue scheduled melatonin and change PRN trazodone to scheduled at bedtime  Will discontinue risperidone PRN and change to Zyprexa  Patient home dose of Clozaril is 100mg PO daily and 400mg in the evening  11/7/22 EKG shows QTc 482  Will repeat today and monitor every 4 to 5 days  Rosy Limon does not have current suicidal thoughts or intent but states if she left the hospital she "would not be safe"  She feels safe in the hospital and contracts for safety while on the unit  Rosy Limon also denies HI/AH/VH, and does not appear overtly manic    Rosy Limon remains adherent to her current psychotropic medication regimen and denies any side effects from medications, as well as none noted on exam     Vitals:  Vitals:    11/11/22 0742   BP: 112/57   Pulse: 61   Resp: 16   Temp: 98 1 °F (36 7 °C)   SpO2: 99%       Laboratory Results:    I have personally reviewed all pertinent laboratory/tests results  Most Recent Labs:   Lab Results   Component Value Date    WBC 8 32 11/09/2022    RBC 4 95 11/09/2022    HGB 14 1 11/09/2022    HCT 44 6 11/09/2022     11/09/2022    RDW 15 2 (H) 11/09/2022    NEUTROABS 5 98 11/09/2022    SODIUM 138 11/09/2022    K 4 0 11/09/2022     11/09/2022    CO2 29 11/09/2022    BUN 7 11/09/2022    CREATININE 0 64 11/09/2022    GLUC 95 11/09/2022    GLUF 129 (H) 04/25/2022    CALCIUM 9 4 11/09/2022    AST 13 11/07/2022    ALT 17 11/07/2022    ALKPHOS 77 11/07/2022    TP 7 4 11/07/2022    ALB 4 3 11/07/2022    TBILI 0 28 11/07/2022    CHOLESTEROL 238 (H) 11/09/2022    HDL 47 (L) 11/09/2022    TRIG 263 (H) 11/09/2022    LDLCALC 138 (H) 11/09/2022    NONHDLC 191 11/09/2022    AMMONIA 28 10/27/2017    ETL7SPIBNODU 2 050 11/07/2022    FREET4 0 89 03/24/2016    PREGUR Negative 11/07/2022    PREGSERUM Negative 10/21/2019    HCG <2 00 04/10/2014    RPR Non-Reactive 11/09/2022    HGBA1C 6 1 (H) 06/18/2022     06/18/2022       Psychiatric Review of Systems:  Behavior over the last 24 hours:  improved  Sleep: on and off  Appetite: fair  Medication side effects: denies and none noted on exam   ROS: no complaints, denies shortness of breath or chest pain and all other systems are negative for acute changes    Mental Status Evaluation:  Appearance:  casually dressed, adequate grooming   Behavior:  cooperative, calm   Speech:  normal rate and volume   Mood:  euthymic   Affect:  brighter   Thought Process:  less disorganized   Thought Content:  persecutory delusions, paranoid ideation, negative thoughts, no overt paranoia noted on exam   Perceptual Disturbances: gustatory and tactile hallucinations, denies auditory and visual hallucinations     Risk Potential: Suicidal ideation - None at present  Homicidal ideation - None at present  Potential for aggression - No   Memory:  recent and remote memory grossly intact   Sensorium  person, place, time/date and situation      Consciousness:  alert and awake   Attention: attention span and concentration appear shorter than expected for age   Insight:  improving   Judgment: improving   Gait/Station: normal gait/station   Motor Activity: no abnormal movements   Progress Toward Goals:   Munir Goode is progressing towards goals of inpatient psychiatric treatment by continued medication compliance and is attending therapeutic modalities on the milieu  However, the patient continues to require inpatient psychiatric hospitalization for continued medication management and titration to optimize symptom reduction, improve sleep hygiene, and demonstrate adequate self-care  Assessment/Plan   Principal Problem:    Schizoaffective disorder, bipolar type ([facility])  Active Problems:    LYNN (generalized anxiety disorder)    Degenerative disc disease, lumbar    Post-traumatic stress disorder, chronic    Mild intermittent asthma without complication    Tobacco abuse    Gastroesophageal reflux disease    Emphysema lung ([facility] [address] )      Recommended Treatment: Treatment plan and medication changes discussed and per the attending physician the plan is: 1  Continue with group therapy, milieu therapy and occupational therapy  2  Behavioral Health checks every 7 minutes  3  Continue frequent safety checks and vitals per unit protocol  4  Continue with SLIM medical management as indicated  5  Continue with current medication regimen  Rechecking EKG today   Clozaril 75mg BID today  Tomorrow will increase to 100 BID  Then starting 11/13/2022 increase EVENING DOSE ONLY by 25mg/day   Effexor XR 37 5mg daily  Cogentin 1mg BID  Monitor EKG every 4-5 days  6  Will review labs in the a m  7 Disposition Planning: Discharge planning and efforts remain ongoing    Behavioral Health Medications: All current medications reviewed    Discontinued melatonin and changed trazodone to 50mg PO at HS scheduled  Discontinued PRN risperidone    Ordered Zyprexa 2 5mg PO for mild agitation and Zyprexa 5mg PO for severe agitation   Current Facility-Administered Medications   Medication Dose Route Frequency Provider Last Rate   • acetaminophen  650 mg Oral Q4H PRN Payton Conrad PA-C     • acetaminophen  650 mg Oral Q4H PRN Payton Conrad PA-C     • acetaminophen  975 mg Oral Q6H PRN Payton Conrad PA-C     • albuterol  2 puff Inhalation Q6H PRN Sam Denson MD     • aluminum-magnesium hydroxide-simethicone  30 mL Oral Q4H PRN Ryan See MD     • artificial tear   Both Eyes HS PRN Payton Conrad PA-C     • benztropine  1 mg Oral BID Cathi Linda MD     • cholecalciferol  1,000 Units Oral Daily Payton Conrad PA-C     • cloZAPine  75 mg Oral BID JARED [place]     • Diclofenac Sodium  2 g Topical 4x Daily PRN Sam Denson MD     • dicyclomine  20 mg Oral 4x Daily PRN Payton Conrad PA-C     • docusate sodium  100 mg Oral Daily Sam Denson MD     • fenofibrate  145 mg Oral Daily Payton Conrad PA-C     • fluticasone  1 spray Each Nare BID Payton Conrad PA-C     • haloperidol lactate  5 mg Intramuscular Q4H PRN Max 4/day Ryan See MD     • hydrOXYzine HCL  50 mg Oral Q6H PRN Max 4/day Cathi Linda MD     • loratadine  10 mg Oral Daily Payton Conrad PA-C     • LORazepam  0 5 mg Oral Q8H PRN Cathi Linda MD     • LORazepam  1 mg Oral Q8H PRN Cathi Linda MD     • melatonin  3 mg Oral HS Ryan See MD     • methocarbamol  500 mg Oral Q6H PRN Payton Conrad PA-C     • nicotine  1 patch Transdermal Daily Payton Conrad PA-C     • nicotine polacrilex  4 mg Oral Q2H PRN Payton Conrad PA-C     • OLANZapine  5 mg Oral TID PRN Cathi Linda MD     • pantoprazole  40 mg Oral Early Morning Payton Conrad PA-C     • polyethylene glycol  17 g Oral Daily PRN Payton Conrad PA-C     • risperiDONE  1 mg Oral Q2H PRN Max 3/day Venice Moran MD     • traZODone  50 mg Oral HS PRN Kenny Church MD     • venlafaxine  37 5 mg Oral Daily Kenny Church MD         Risks / Benefits of Treatment:  Risks, benefits, and possible side effects of medications explained to patient and patient verbalizes understanding and agreement for treatment  Counseling / Coordination of Care:  Patient's progress reviewed with nursing staff  Medications, treatment progress and treatment plan reviewed with patient  Supportive counseling provided to the patient  Total floor/unit time spent today 25 minutes  Greater than 50% of total time was spent with the patient and / or family counseling and / or coordination of care  A description of the counseling / coordination of care: medication education, treatment plan, supportive therapy  No

## 2022-11-11 NOTE — PROGRESS NOTES
Progress Note - Sheryle Foster 47 y o  female MRN: [id number]    Unit/Bed#Gabriel Pollock 204-01 Encounter: [id number]        Subjective:   Patient seen and examined at bedside after reviewing the chart and discussing the case with the caring staff  Patient requesting Ensure Clear berry flavor with meals  She otherwise denies any complaints  Physical Exam   Vitals: Blood pressure 112/57, pulse 61, temperature 98 1 °F (36 7 °C), temperature source Temporal, resp  rate 16, height 5' 1" (1 549 m), weight 68 9 kg (151 lb 12 8 oz), SpO2 99 %, not currently breastfeeding  ,Body mass index is 28 68 kg/m²  Constitutional: Patient in no acute distress  HEENT: PERR, EOMI, MMM, rhinorrhea  Cardiovascular: Normal rate and regular rhythm  Pulmonary/Chest: Effort normal and breath sounds normal    Abdomen:  Non distended  Assessment/Plan:  Sheryle Foster is a(n) 47y o  year old female with schizoaffective disorder      1  GERD/abd cramping  Continue Protonix 40 mg daily, Mylanta and Bentyl as needed  2  Tobacco abuse  Patient is on nicotine transdermal patch 21 mg/24 hr, nicotine gum as needed  3  Hyperlipidemia  Continue fenofibrate 145 mg daily  4  Constipation  Patient has been put on Colace daily, MiraLax as needed  5  Asthma  Patient may use albuterol inhaler as needed  6  DDD/OA  Patient may take Tylenol and use Voltaren gel as needed  Discontinue Flexeril and trial Robaxin  7  Nasal congestion  Zyrtec not available in hospital   Patient placed on Claritin 10 mg daily and Flonase nasal spray twice daily  8  Vitamin-D deficiency  Patient started on vitamin D3 1000 units daily  The patient was discussed with Dr Matthew Jack and he is in agreement with the above note

## 2022-11-11 NOTE — NURSING NOTE
Patient spent the evening napping in her room  She did not come out for dinner  Woke up for HS medications and then returned to sleep  No signs of distress noted  Maintained on Q 7 minute checks  Patient awake, requests and receives Ativan 1 MG PO PRN for anxiety at 21:58   Martines score - 21

## 2022-11-11 NOTE — PLAN OF CARE
Problem: Ineffective Coping  Goal: Identifies ineffective coping skills  Outcome: Progressing  Goal: Demonstrates healthy coping skills  Outcome: Progressing  Goal: Participates in unit activities  Description: Interventions:  - Provide therapeutic environment   - Provide required programming   - Redirect inappropriate behaviors   Outcome: Progressing     Problem: Risk for Self Injury/Neglect  Goal: Treatment Goal: Remain safe during length of stay, learn and adopt new coping skills, and be free of self-injurious ideation, impulses and acts at the time of discharge  Outcome: Progressing  Goal: Refrain from harming self  Description: Interventions:  - Monitor patient closely, per order  - Develop a trusting relationship  - Supervise medication ingestion, monitor effects and side effects   Outcome: Progressing  Goal: Recognize maladaptive responses and adopt new coping mechanisms  Outcome: Progressing     Problem: Depression  Goal: Treatment Goal: Demonstrate behavioral control of depressive symptoms, verbalize feelings of improved mood/affect, and adopt new coping skills prior to discharge  Description: Patient to use strength of negotiating basic need to seek help to manage depressive symptoms    Outcome: Progressing  Goal: Verbalize thoughts and feelings  Description: Interventions:  - Assess and re-assess patient's level of risk   - Engage patient in 1:1 interactions, daily, for a minimum of 15 minutes   - Encourage patient to express feelings, fears, frustrations, hopes   Outcome: Progressing  Goal: Refrain from isolation  Description: Interventions:  - Develop a trusting relationship   - Encourage socialization   Outcome: Progressing  Goal: Refrain from self-neglect  Outcome: Progressing  Goal: Attend and participate in unit activities, including therapeutic, recreational, and educational groups  Description: Interventions:  - Provide therapeutic and educational activities daily, encourage attendance and participation, and document same in the medical record   Outcome: Progressing  Goal: Complete daily ADLs, including personal hygiene independently, as able  Description: Interventions:  - Observe, teach, and assist patient with ADLS  -  Monitor and promote a balance of rest/activity, with adequate nutrition and elimination   Outcome: Progressing     Problem: Anxiety  Goal: Anxiety is at manageable level  Description: Interventions:  - Assess and monitor patient's anxiety level  - Monitor for signs and symptoms (heart palpitations, chest pain, shortness of breath, headaches, nausea, feeling jumpy, restlessness, irritable, apprehensive)  - Collaborate with interdisciplinary team and initiate plan and interventions as ordered    - Wheeler patient to unit/surroundings  - Explain treatment plan  - Encourage participation in care  - Encourage verbalization of concerns/fears  - Identify coping mechanisms  - Assist in developing anxiety-reducing skills  - Administer/offer alternative therapies  - Limit or eliminate stimulants  Outcome: Progressing     Problem: DISCHARGE PLANNING - CARE MANAGEMENT  Goal: Discharge to post-acute care or home with appropriate resources  Description: INTERVENTIONS:  - Conduct assessment to determine patient/family and health care team treatment goals, and need for post-acute services based on payer coverage, community resources, and patient preferences, and barriers to discharge  - Address psychosocial, clinical, and financial barriers to discharge as identified in assessment in conjunction with the patient/family and health care team  - Arrange appropriate level of post-acute services according to patient’s   needs and preference and payer coverage in collaboration with the physician and health care team  - Communicate with and update the patient/family, physician, and health care team regarding progress on the discharge plan  - Arrange appropriate transportation to post-acute venues  Outcome: Progressing

## 2022-11-11 NOTE — PROGRESS NOTES
11/11/22 3535   Activity/Group Checklist   Group Community meeting   Attendance Did not attend  (Pt sleeping)

## 2022-11-11 NOTE — NURSING NOTE
Patient reported feeling "a little fuzzy" upon initial encounter  Somewhat guarded/ cautious in response  Provided no numeric value for levels of depression/anxiety  Denied thoughts of self or other-directed harm  Admitted to [facility], though stated she is not presently experiencing same  Mood suddenly became intensely labile, with yelling/screaming in her room which she denied as a response to [facility], but rather "because they're making me eat things I don't want  Why do they make me fat " Declined anti-psychotic med administration  Agreeable to Ativan: 1 mg given po 0950 with positive results  Quiet, calm and appropriate remainder of AM  Will continue to support positive gains

## 2022-11-12 LAB
ATRIAL RATE: 86 BPM
ATRIAL RATE: 89 BPM
P AXIS: 46 DEGREES
P AXIS: 54 DEGREES
PR INTERVAL: 154 MS
PR INTERVAL: 156 MS
QRS AXIS: 1 DEGREES
QRS AXIS: 6 DEGREES
QRSD INTERVAL: 74 MS
QRSD INTERVAL: 74 MS
QT INTERVAL: 386 MS
QT INTERVAL: 388 MS
QTC INTERVAL: 461 MS
QTC INTERVAL: 472 MS
T WAVE AXIS: 39 DEGREES
T WAVE AXIS: 49 DEGREES
VENTRICULAR RATE: 86 BPM
VENTRICULAR RATE: 89 BPM

## 2022-11-12 RX ORDER — BENZTROPINE MESYLATE 0.5 MG/1
0.5 TABLET ORAL 2 TIMES DAILY
Status: DISCONTINUED | OUTPATIENT
Start: 2022-11-12 | End: 2022-11-12

## 2022-11-12 RX ORDER — TRAZODONE HYDROCHLORIDE 50 MG/1
50 TABLET ORAL
Status: DISCONTINUED | OUTPATIENT
Start: 2022-11-12 | End: 2022-11-18 | Stop reason: HOSPADM

## 2022-11-12 RX ORDER — GLYCOPYRROLATE 1 MG/1
1 TABLET ORAL 2 TIMES DAILY
Status: DISCONTINUED | OUTPATIENT
Start: 2022-11-12 | End: 2022-11-18 | Stop reason: HOSPADM

## 2022-11-12 RX ADMIN — CLOZAPINE 100 MG: 100 TABLET ORAL at 16:51

## 2022-11-12 RX ADMIN — LORATADINE 10 MG: 10 TABLET ORAL at 09:06

## 2022-11-12 RX ADMIN — OLANZAPINE 5 MG: 5 TABLET, FILM COATED ORAL at 11:11

## 2022-11-12 RX ADMIN — VENLAFAXINE HYDROCHLORIDE 37.5 MG: 37.5 CAPSULE, EXTENDED RELEASE ORAL at 09:06

## 2022-11-12 RX ADMIN — CLOZAPINE 100 MG: 100 TABLET ORAL at 09:06

## 2022-11-12 RX ADMIN — METHOCARBAMOL 500 MG: 500 TABLET ORAL at 19:06

## 2022-11-12 RX ADMIN — PANTOPRAZOLE SODIUM 40 MG: 40 TABLET, DELAYED RELEASE ORAL at 05:59

## 2022-11-12 RX ADMIN — NICOTINE 1 PATCH: 21 PATCH, EXTENDED RELEASE TRANSDERMAL at 09:58

## 2022-11-12 RX ADMIN — LORAZEPAM 1 MG: 1 TABLET ORAL at 20:59

## 2022-11-12 RX ADMIN — TRAZODONE HYDROCHLORIDE 50 MG: 50 TABLET ORAL at 20:59

## 2022-11-12 RX ADMIN — GLYCOPYRROLATE 1 MG: 1 TABLET ORAL at 16:51

## 2022-11-12 RX ADMIN — CHOLECALCIFEROL TAB 25 MCG (1000 UNIT) 1000 UNITS: 25 TAB at 09:06

## 2022-11-12 RX ADMIN — FLUTICASONE PROPIONATE 1 SPRAY: 50 SPRAY, METERED NASAL at 18:12

## 2022-11-12 RX ADMIN — LORAZEPAM 0.5 MG: 0.5 TABLET ORAL at 11:11

## 2022-11-12 RX ADMIN — BENZTROPINE MESYLATE 1 MG: 1 TABLET ORAL at 09:06

## 2022-11-12 RX ADMIN — FENOFIBRATE 145 MG: 145 TABLET, COATED ORAL at 09:06

## 2022-11-12 RX ADMIN — FLUTICASONE PROPIONATE 1 SPRAY: 50 SPRAY, METERED NASAL at 09:04

## 2022-11-12 RX ADMIN — DOCUSATE SODIUM 100 MG: 100 CAPSULE, LIQUID FILLED ORAL at 09:06

## 2022-11-12 NOTE — NURSING NOTE
Pt was out to nurses station x4 during the night stating she was having trouble sleeping  PRNs and scheduled medications for sleep were all given as ordered  Pt has been in room for last 1 5 hours and appears to be resting with no signs of pain or discomfort  Q7 safety checks maintained  Will continue to monitor

## 2022-11-12 NOTE — PROGRESS NOTES
Progress Note - Behavioral Health   Genny Ramírez 47 y o  female MRN: [id number]  Unit/Bed#: Yrn Dahl 204-01 Encounter: [id number]    Assessment/Plan   Principal Problem:    Schizoaffective disorder, bipolar type ([facility] 75 )  Active Problems:    LYNN (generalized anxiety disorder)    Degenerative disc disease, lumbar    Post-traumatic stress disorder, chronic    Mild intermittent asthma without complication    Tobacco abuse    Gastroesophageal reflux disease    Emphysema lung ([facility] 75 )    Recommended Treatment:   Discontinue Cogentin and replace this glycopyrrolate  Switch trazodone to p r n  All current active medications have been reviewed  Encourage group therapy, milieu therapy and occupational therapy  Behavioral Health checks every 7 minutes  Medical management per SLIM    Legal Status: 201  ----------------------------------------      Subjective:  Patient was seen and examined today at the bedside during morning rounds  Patient is easily agitated and easily provoked however she can be redirected verbally or or by taking p r n  Medications  She is tolerating the increase in Clozaril and complains of from sialorrhea  It is unclear why patient is taking Cogentin without having symptoms or signs of extrapyramidal symptoms so it will be replaced by glycopyrrolate which will have less anticholinergic side effects than Cogentin  As we are increasing the Clozaril to higher level we may not need trazodone every night So  switched to p r n  To avoid excess sedation  Patient still paranoid and delusional   She believes that someone changed her meals to heart healthy however her meds are still regular  She also asked for having supplements which she already gets       Behavior over the last 24 hours:  unchanged  Sleep: normal  Appetite: increased  Medication side effects:  Sialorrhea  ROS: constipation and all other systems are negative    Mental Status Evaluation:  Appearance:  age appropriate, casually dressed   Behavior: demanding, guarded   Speech:  pressured, hypertalkative   Mood:  anxious, labile, irritable   Affect:  expansive   Thought Process:  increased rate of thoughts   Associations: tangential associations   Thought Content:  paranoid ideation   Perceptual Disturbances: denies auditory hallucinations when asked   Risk Potential: Suicidal ideation - None at present  Homicidal ideation - None at present  Potential for aggression - No   Sensorium:  oriented to person, place and time/date   Memory:  recent and remote memory: unable to assess due to lack of cooperation   Consciousness:  alert and awake   Attention/Concentration: attention span and concentration are age appropriate   Insight:  impaired due to psychosis   Judgment: impaired due to psychosis   Gait/Station: normal gait/station   Motor Activity: no abnormal movements     Medications:   all current active meds have been reviewed, current meds:   Current Facility-Administered Medications   Medication Dose Route Frequency   • acetaminophen (TYLENOL) tablet 650 mg  650 mg Oral Q4H PRN   • acetaminophen (TYLENOL) tablet 650 mg  650 mg Oral Q4H PRN   • acetaminophen (TYLENOL) tablet 975 mg  975 mg Oral Q6H PRN   • albuterol (PROVENTIL HFA,VENTOLIN HFA) inhaler 2 puff  2 puff Inhalation Q6H PRN   • aluminum-magnesium hydroxide-simethicone (MYLANTA) oral suspension 30 mL  30 mL Oral Q4H PRN   • artificial tear (LUBRIFRESH P M ) ophthalmic ointment   Both Eyes HS PRN   • cholecalciferol (VITAMIN D3) tablet 1,000 Units  1,000 Units Oral Daily   • cloZAPine (CLOZARIL) tablet 100 mg  100 mg Oral BID   • Diclofenac Sodium (VOLTAREN) 1 % topical gel 2 g  2 g Topical 4x Daily PRN   • dicyclomine (BENTYL) tablet 20 mg  20 mg Oral 4x Daily PRN   • docusate sodium (COLACE) capsule 100 mg  100 mg Oral Daily   • fenofibrate (TRICOR) tablet 145 mg  145 mg Oral Daily   • fluticasone (FLONASE) 50 mcg/act nasal spray 1 spray  1 spray Each Nare BID   • glycopyrrolate (ROBINUL) tablet 1 mg 1 mg Oral BID   • haloperidol lactate (HALDOL) injection 5 mg  5 mg Intramuscular Q4H PRN Max 4/day   • hydrOXYzine HCL (ATARAX) tablet 50 mg  50 mg Oral Q6H PRN Max 4/day   • loratadine (CLARITIN) tablet 10 mg  10 mg Oral Daily   • LORazepam (ATIVAN) tablet 0 5 mg  0 5 mg Oral Q8H PRN   • LORazepam (ATIVAN) tablet 1 mg  1 mg Oral Q8H PRN   • methocarbamol (ROBAXIN) tablet 500 mg  500 mg Oral Q6H PRN   • nicotine (NICODERM CQ) 21 mg/24 hr TD 24 hr patch 1 patch  1 patch Transdermal Daily   • nicotine polacrilex (NICORETTE) gum 4 mg  4 mg Oral Q2H PRN   • OLANZapine (ZyPREXA) tablet 2 5 mg  2 5 mg Oral TID PRN   • OLANZapine (ZyPREXA) tablet 5 mg  5 mg Oral Q6H PRN Max 3/day   • pantoprazole (PROTONIX) EC tablet 40 mg  40 mg Oral Early Morning   • polyethylene glycol (MIRALAX) packet 17 g  17 g Oral Daily PRN   • traZODone (DESYREL) tablet 50 mg  50 mg Oral HS PRN   • venlafaxine (EFFEXOR-XR) 24 hr capsule 37 5 mg  37 5 mg Oral Daily    and planned medication changes:  Switch trazodone to p r n  And replace Cogentin with glycopyrrolate      Current Facility-Administered Medications   Medication Dose Route Frequency Provider Last Rate   • acetaminophen  650 mg Oral Q4H PRN Payton Conrad PA-C     • acetaminophen  650 mg Oral Q4H PRN Payton Conrad PA-C     • acetaminophen  975 mg Oral Q6H PRN Payton Conrad PA-C     • albuterol  2 puff Inhalation Q6H PRN Tony Hernandez MD     • aluminum-magnesium hydroxide-simethicone  30 mL Oral Q4H PRN Rashmi Bill MD     • artificial tear   Both Eyes HS PRN Payton Conrad PA-C     • cholecalciferol  1,000 Units Oral Daily Payton Conrad PA-C     • cloZAPine  100 mg Oral BID JARED [place]     • Diclofenac Sodium  2 g Topical 4x Daily PRN Tony Hernandez MD     • dicyclomine  20 mg Oral 4x Daily PRN Payton Conrad PA-C     • docusate sodium  100 mg Oral Daily Tony Hernandez MD     • fenofibrate  145 mg Oral Daily Payton Conrad PA-C     • fluticasone  1 spray Each Nare BID Payton Conrad PA-C     • glycopyrrolate  1 mg Oral BID [facility] Melina Grant MD     • haloperidol lactate  5 mg Intramuscular Q4H PRN Max 4/day Odell Marsh MD     • hydrOXYzine HCL  50 mg Oral Q6H PRN Max 4/day Jose Ramon Echevarria MD     • loratadine  10 mg Oral Daily Payton Conrad PA-C     • LORazepam  0 5 mg Oral Q8H PRN Jose Ramon Echevarria MD     • LORazepam  1 mg Oral Q8H PRN Jose Ramon Echevarria MD     • methocarbamol  500 mg Oral Q6H PRN Payton Conrad PA-C     • nicotine  1 patch Transdermal Daily Payton Conrad PA-C     • nicotine polacrilex  4 mg Oral Q2H PRN Payton Conrad PA-C     • OLANZapine  2 5 mg Oral TID PRN JARED Barker     • OLANZapine  5 mg Oral Q6H PRN Max 3/day JARED [place]     • pantoprazole  40 mg Oral Early Morning Payton Conrad PA-C     • polyethylene glycol  17 g Oral Daily PRN Payton Conrad PA-C     • traZODone  50 mg Oral HS PRN [facility] Melina Grant MD     • venlafaxine  37 5 mg Oral Daily Jose Ramon Echevarria MD         Labs:  I have personally reviewed all pertinent laboratory/tests results  Most Recent Labs:   Lab Results   Component Value Date    WBC 8 32 11/09/2022    RBC 4 95 11/09/2022    HGB 14 1 11/09/2022    HCT 44 6 11/09/2022     11/09/2022    RDW 15 2 (H) 11/09/2022    NEUTROABS 5 98 11/09/2022    SODIUM 138 11/09/2022    K 4 0 11/09/2022     11/09/2022    CO2 29 11/09/2022    BUN 7 11/09/2022    CREATININE 0 64 11/09/2022    GLUC 95 11/09/2022    CALCIUM 9 4 11/09/2022    AST 13 11/07/2022    ALT 17 11/07/2022    ALKPHOS 77 11/07/2022    TP 7 4 11/07/2022    ALB 4 3 11/07/2022    TBILI 0 28 11/07/2022    CHOLESTEROL 238 (H) 11/09/2022    HDL 47 (L) 11/09/2022    TRIG 263 (H) 11/09/2022    LDLCALC 138 (H) 11/09/2022    NONHDLC 191 11/09/2022    AMMONIA 28 10/27/2017    GLZ8RHCGDDXL 2 050 11/07/2022    FREET4 0 89 03/24/2016    PREGUR Negative 11/07/2022    PREGSERUM Negative 10/21/2019    HCG <2 00 04/10/2014    RPR Non-Reactive 11/09/2022    HGBA1C 6 1 (H) 06/18/2022     06/18/2022       Progress Toward Goals: minimal improvement    Risks / Benefits of Treatment:    Risks, benefits, and possible side effects of medications explained to patient  Patient has limited understanding of risks and benefits of treatment at this time, but agrees to take medications as prescribed  Counseling / Coordination of Care: Total floor / unit time spent today 35 minutes  Greater than 50% of total time was spent with the patient and / or family counseling and / or coordination of care  A description of counseling / coordination of care:  Patient's progress discussed with staff in treatment team meeting  Medications, treatment progress and treatment plan reviewed with patient  Recent stressors including limited support and noncompliance with treatment discussed with patient  Educated on importance of medication and treatment compliance  Reassurance and supportive therapy provided  Encouraged participation in milieu and group therapy on the unit      Jina Bella MD 11/12/22

## 2022-11-12 NOTE — PLAN OF CARE
Problem: Ineffective Coping  Goal: Identifies ineffective coping skills  Outcome: Not Progressing  Goal: Demonstrates healthy coping skills  Outcome: Not Progressing  Goal: Participates in unit activities  Description: Interventions:  - Provide therapeutic environment   - Provide required programming   - Redirect inappropriate behaviors   Outcome: Not Progressing

## 2022-11-12 NOTE — NURSING NOTE
Pt present and visible in the milieu, paces the hallway and spent some time in the milieu  Pt is cooperative, compliant with medications and appears withdrawn and depressed  Pt assessed in her room and stated that she had increased depression which made her suicidal  Pt was initially alert and oriented x4 with clear thought patterns however she became more disorganized as the shift went on  Pt started talking about bizarre delusions stating that she's here because of the hypnotism  Pt began randomly yelling our and became internally preoccupied  Pt came to RN at the nurses station shortly afterwards and stated, "I'm psychotic now and anxious! I need PRNs " Pt appeared restless, fidgeting her hands and had trouble maintaining eye contact  Pt received 0 5mg Ativan and 5mg Zyprexa PO at this time  Pt would settle down and retreated to her room  Pt requested eating in her room during Lunch but couldn't explain why  Pt was redirected to eat in the milieu which she complied with  Pt behaviors have improved  Pt denies si, hi  Continuous visual safety checks performed staggered q7 minutes  All safety precautions are in place  No acute concerns at this time  Will continue to monitor

## 2022-11-12 NOTE — NURSING NOTE
Pt withdrawn to room this evening  Stated 10/10 anxiety, PRN Ativan 1mg given for [facility] of 22  PRN Robaxin given at HS  Pt endorses mild depression and denies HI/SI and hallucinations  Pt is currently sleeping  All needs are currently met  Will continue to monitor

## 2022-11-12 NOTE — PLAN OF CARE
Problem: Risk for Self Injury/Neglect  Goal: Refrain from harming self  Description: Interventions:  - Monitor patient closely, per order  - Develop a trusting relationship  - Supervise medication ingestion, monitor effects and side effects   Outcome: Progressing     Problem: Depression  Goal: Verbalize thoughts and feelings  Description: Interventions:  - Assess and re-assess patient's level of risk   - Engage patient in 1:1 interactions, daily, for a minimum of 15 minutes   - Encourage patient to express feelings, fears, frustrations, hopes   Outcome: Progressing  Goal: Refrain from isolation  Description: Interventions:  - Develop a trusting relationship   - Encourage socialization   Outcome: Progressing  Goal: Refrain from self-neglect  Outcome: Progressing

## 2022-11-13 RX ORDER — VENLAFAXINE HYDROCHLORIDE 75 MG/1
75 CAPSULE, EXTENDED RELEASE ORAL DAILY
Status: DISCONTINUED | OUTPATIENT
Start: 2022-11-14 | End: 2022-11-18 | Stop reason: HOSPADM

## 2022-11-13 RX ADMIN — GLYCOPYRROLATE 1 MG: 1 TABLET ORAL at 17:03

## 2022-11-13 RX ADMIN — CLOZAPINE 100 MG: 100 TABLET ORAL at 08:23

## 2022-11-13 RX ADMIN — LORAZEPAM 0.5 MG: 0.5 TABLET ORAL at 12:59

## 2022-11-13 RX ADMIN — NICOTINE 1 PATCH: 21 PATCH, EXTENDED RELEASE TRANSDERMAL at 08:23

## 2022-11-13 RX ADMIN — FLUTICASONE PROPIONATE 1 SPRAY: 50 SPRAY, METERED NASAL at 17:03

## 2022-11-13 RX ADMIN — VENLAFAXINE HYDROCHLORIDE 37.5 MG: 37.5 CAPSULE, EXTENDED RELEASE ORAL at 08:23

## 2022-11-13 RX ADMIN — CHOLECALCIFEROL TAB 25 MCG (1000 UNIT) 1000 UNITS: 25 TAB at 08:23

## 2022-11-13 RX ADMIN — CLOZAPINE 100 MG: 100 TABLET ORAL at 17:03

## 2022-11-13 RX ADMIN — TRAZODONE HYDROCHLORIDE 50 MG: 50 TABLET ORAL at 21:02

## 2022-11-13 RX ADMIN — LORAZEPAM 1 MG: 1 TABLET ORAL at 21:05

## 2022-11-13 RX ADMIN — GLYCOPYRROLATE 1 MG: 1 TABLET ORAL at 08:22

## 2022-11-13 RX ADMIN — DOCUSATE SODIUM 100 MG: 100 CAPSULE, LIQUID FILLED ORAL at 08:23

## 2022-11-13 RX ADMIN — PANTOPRAZOLE SODIUM 40 MG: 40 TABLET, DELAYED RELEASE ORAL at 05:37

## 2022-11-13 RX ADMIN — FLUTICASONE PROPIONATE 1 SPRAY: 50 SPRAY, METERED NASAL at 08:21

## 2022-11-13 RX ADMIN — FENOFIBRATE 145 MG: 145 TABLET, COATED ORAL at 08:23

## 2022-11-13 RX ADMIN — LORATADINE 10 MG: 10 TABLET ORAL at 08:23

## 2022-11-13 NOTE — NURSING NOTE
Pt slept through the night with no signs of pain or distress observed  Q7 safety checks maintained  Will continue to monitor  Doxycycline Pregnancy And Lactation Text: This medication is Pregnancy Category D and not consider safe during pregnancy. It is also excreted in breast milk but is considered safe for shorter treatment courses.

## 2022-11-13 NOTE — PROGRESS NOTES
Progress Note - Sheryle Foster 47 y o  female MRN: [id number]    Unit/Bed#Gab[facility] Pollock 204-01 Encounter: [id number]        Subjective:   Patient seen and examined at bedside after reviewing the chart and discussing the case with the caring staff  Patient examined at bedside  Patient denied any acute symptoms  Physical Exam   Vitals: Blood pressure 113/74, pulse 80, temperature 99 1 °F (37 3 °C), temperature source Temporal, resp  rate 18, height 5' 1" (1 549 m), weight 68 8 kg (151 lb 9 6 oz), SpO2 100 %, not currently breastfeeding  ,Body mass index is 28 64 kg/m²  Constitutional: Patient in no acute distress  HEENT: PERR, EOMI, MMM, rhinorrhea  Cardiovascular: Normal rate and regular rhythm  Pulmonary/Chest: Effort normal and breath sounds normal    Abdomen:  Non distended  Assessment/Plan:  Sheryle Foster is a(n) 47y o  year old female with schizoaffective disorder      1  GERD/abd cramping  Continue Protonix 40 mg daily, Mylanta and Bentyl as needed  2  Tobacco abuse  Patient is on nicotine transdermal patch 21 mg/24 hr, nicotine gum as needed  3  Hyperlipidemia  Continue fenofibrate 145 mg daily  4  Constipation  Patient has been put on Colace daily, MiraLax as needed  5  Asthma  Patient may use albuterol inhaler as needed  6  DDD/OA  Patient may take Tylenol and use Voltaren gel as needed  Discontinue Flexeril and trial Robaxin  7  Nasal congestion  Zyrtec not available in hospital   Patient placed on Claritin 10 mg daily and Flonase nasal spray twice daily  8  Vitamin-D deficiency  Patient started on vitamin D3 1000 units daily

## 2022-11-13 NOTE — PLAN OF CARE
Problem: Ineffective Coping  Goal: Identifies ineffective coping skills  Outcome: Progressing  Goal: Demonstrates healthy coping skills  Outcome: Progressing  Goal: Participates in unit activities  Description: Interventions:  - Provide therapeutic environment   - Provide required programming   - Redirect inappropriate behaviors   Outcome: Progressing

## 2022-11-13 NOTE — NURSING NOTE
Pt c/o "high" anxiety  Pt medicated with PRN Ativan 1mg with Martines score of 24  Pt also requested PRN Trazodone for sleep  Pt stated depression as "I little better", denies SI/HI and hallucinations  Flat affect, very short answers upon assessment  Withdrawn to room all evening  Q7 safety checks maintained  Will continue to monitor

## 2022-11-13 NOTE — PLAN OF CARE
Problem: Ineffective Coping  Goal: Identifies ineffective coping skills  Outcome: Progressing  Goal: Demonstrates healthy coping skills  Outcome: Progressing     Problem: Risk for Self Injury/Neglect  Goal: Treatment Goal: Remain safe during length of stay, learn and adopt new coping skills, and be free of self-injurious ideation, impulses and acts at the time of discharge  Outcome: Progressing  Goal: Refrain from harming self  Description: Interventions:  - Monitor patient closely, per order  - Develop a trusting relationship  - Supervise medication ingestion, monitor effects and side effects   Outcome: Progressing  Goal: Recognize maladaptive responses and adopt new coping mechanisms  Outcome: Progressing     Problem: Depression  Goal: Verbalize thoughts and feelings  Description: Interventions:  - Assess and re-assess patient's level of risk   - Engage patient in 1:1 interactions, daily, for a minimum of 15 minutes   - Encourage patient to express feelings, fears, frustrations, hopes   Outcome: Progressing  Goal: Refrain from self-neglect  Outcome: Progressing  Goal: Attend and participate in unit activities, including therapeutic, recreational, and educational groups  Description: Interventions:  - Provide therapeutic and educational activities daily, encourage attendance and participation, and document same in the medical record   Outcome: Progressing  Goal: Complete daily ADLs, including personal hygiene independently, as able  Description: Interventions:  - Observe, teach, and assist patient with ADLS  -  Monitor and promote a balance of rest/activity, with adequate nutrition and elimination   Outcome: Progressing     Problem: Anxiety  Goal: Anxiety is at manageable level  Description: Interventions:  - Assess and monitor patient's anxiety level  - Monitor for signs and symptoms (heart palpitations, chest pain, shortness of breath, headaches, nausea, feeling jumpy, restlessness, irritable, apprehensive)  - Collaborate with interdisciplinary team and initiate plan and interventions as ordered    - [place] patient to unit/surroundings  - Explain treatment plan  - Encourage participation in care  - Encourage verbalization of concerns/fears  - Identify coping mechanisms  - Assist in developing anxiety-reducing skills  - Administer/offer alternative therapies  - Limit or eliminate stimulants  Outcome: Progressing     Problem: DISCHARGE PLANNING - CARE MANAGEMENT  Goal: Discharge to post-acute care or home with appropriate resources  Description: INTERVENTIONS:  - Conduct assessment to determine patient/family and health care team treatment goals, and need for post-acute services based on payer coverage, community resources, and patient preferences, and barriers to discharge  - Address psychosocial, clinical, and financial barriers to discharge as identified in assessment in conjunction with the patient/family and health care team  - Arrange appropriate level of post-acute services according to patient’s   needs and preference and payer coverage in collaboration with the physician and health care team  - Communicate with and update the patient/family, physician, and health care team regarding progress on the discharge plan  - Arrange appropriate transportation to post-acute venues  Outcome: Progressing

## 2022-11-13 NOTE — NURSING NOTE
Pt present and visible in the milieu during meals and initially attended group but left early and minimally participated  Pt reports anxiety as 6/10 and feels depressed about how she feels  Pt states she knows she has auditory hallucinations but they often convince her that they're real and she has a hard time deciphering the difference  Pt reported increased anxiety later in the shift, 7/10 and requested PRN Ativan 0 5mg which was administered  Pt returned to her room afterward  Pt continues to make her needs known and is calm and cooperative  Pt does believe she needs her medications increased due to the hallucinations not going away  Continuous visual safety checks performed staggered q7 minutes  All safety precautions are in place  No acute concerns at this time  Will continue to monitor

## 2022-11-14 RX ORDER — HYDROCORTISONE 25 MG/G
CREAM TOPICAL 4 TIMES DAILY PRN
Status: DISCONTINUED | OUTPATIENT
Start: 2022-11-14 | End: 2022-11-18 | Stop reason: HOSPADM

## 2022-11-14 RX ORDER — CLOZAPINE 25 MG/1
25 TABLET ORAL DAILY
Status: DISCONTINUED | OUTPATIENT
Start: 2022-11-14 | End: 2022-11-15

## 2022-11-14 RX ORDER — CLOZAPINE 100 MG/1
100 TABLET ORAL
Status: DISCONTINUED | OUTPATIENT
Start: 2022-11-14 | End: 2022-11-18 | Stop reason: HOSPADM

## 2022-11-14 RX ORDER — CLOZAPINE 100 MG/1
100 TABLET ORAL DAILY
Status: DISCONTINUED | OUTPATIENT
Start: 2022-11-15 | End: 2022-11-18 | Stop reason: HOSPADM

## 2022-11-14 RX ADMIN — CLOZAPINE 100 MG: 100 TABLET ORAL at 21:39

## 2022-11-14 RX ADMIN — GLYCOPYRROLATE 1 MG: 1 TABLET ORAL at 08:22

## 2022-11-14 RX ADMIN — CLOZAPINE 25 MG: 25 TABLET ORAL at 16:00

## 2022-11-14 RX ADMIN — PANTOPRAZOLE SODIUM 40 MG: 40 TABLET, DELAYED RELEASE ORAL at 05:54

## 2022-11-14 RX ADMIN — VENLAFAXINE HYDROCHLORIDE 75 MG: 75 CAPSULE, EXTENDED RELEASE ORAL at 08:21

## 2022-11-14 RX ADMIN — CHOLECALCIFEROL TAB 25 MCG (1000 UNIT) 1000 UNITS: 25 TAB at 08:22

## 2022-11-14 RX ADMIN — TRAZODONE HYDROCHLORIDE 50 MG: 50 TABLET ORAL at 21:40

## 2022-11-14 RX ADMIN — CLOZAPINE 100 MG: 100 TABLET ORAL at 08:22

## 2022-11-14 RX ADMIN — HYDROCORTISONE 1 APPLICATION: 25 CREAM TOPICAL at 16:12

## 2022-11-14 RX ADMIN — LORATADINE 10 MG: 10 TABLET ORAL at 08:22

## 2022-11-14 RX ADMIN — LORAZEPAM 1 MG: 1 TABLET ORAL at 20:27

## 2022-11-14 RX ADMIN — GLYCOPYRROLATE 1 MG: 1 TABLET ORAL at 17:03

## 2022-11-14 RX ADMIN — NICOTINE 1 PATCH: 21 PATCH, EXTENDED RELEASE TRANSDERMAL at 08:22

## 2022-11-14 RX ADMIN — FLUTICASONE PROPIONATE 1 SPRAY: 50 SPRAY, METERED NASAL at 17:03

## 2022-11-14 RX ADMIN — FENOFIBRATE 145 MG: 145 TABLET, COATED ORAL at 08:22

## 2022-11-14 RX ADMIN — FLUTICASONE PROPIONATE 1 SPRAY: 50 SPRAY, METERED NASAL at 08:22

## 2022-11-14 RX ADMIN — DOCUSATE SODIUM 100 MG: 100 CAPSULE, LIQUID FILLED ORAL at 08:21

## 2022-11-14 NOTE — NURSING NOTE
Pt c/o 10/10 anxiety  PRN ativan 1mg given  Martines 26  PRN trazodone given as well as pt states she has trouble sleeping with roommate present and awake all night  Appears as though PRNs were effective as pt is sleeping at this time  Pt rates depression as 5/10, denies AH/VH, and hallucinations  Pt did mention that she was having AH earlier but denies any this evening  Q7 safety checks maintained  Will continue to monitor

## 2022-11-14 NOTE — PROGRESS NOTES
11/14/22   Team Meeting   Meeting Type Daily Rounds   Team Members Present   Team Members Present Physician;Nurse;; Occupational Therapist   Physician Team Member Dr Ranjeet Caro MD   Nursing Team Member Thang Story RN   Social Work Team Member [id number]; [facility]   OT Team Member Lisabeth Habermann   Patient/Family Present   Patient Present No   Patient's Family Present No     Walking up and down the halls all weekend, mostly controlled  Has insight on hallucinations, but continues to question their veracity

## 2022-11-14 NOTE — PROGRESS NOTES
Progress Note - María Elena Baig 47 y o  female MRN: [id number]    Unit/Bed#Michael Arevalo 204-01 Encounter: [id number]        Subjective:   Patient seen and examined at bedside after reviewing the chart and discussing the case with the caring staff  Patient examined at bedside  Patient reports pain in her hemorrhoids    Physical Exam   Vitals: Blood pressure 104/73, pulse 82, temperature 97 5 °F (36 4 °C), temperature source Temporal, resp  rate 18, height 5' 1" (1 549 m), weight 68 8 kg (151 lb 9 6 oz), SpO2 92 %, not currently breastfeeding  ,Body mass index is 28 64 kg/m²  Constitutional: Patient in no acute distress  HEENT: PERR, EOMI, MMM, rhinorrhea  Cardiovascular: Normal rate and regular rhythm  Pulmonary/Chest: Effort normal and breath sounds normal    Abdomen:  Non distended  Assessment/Plan:  María Elena Baig is a(n) 47y o  year old female with schizoaffective disorder      1  GERD/abd cramping  Continue Protonix 40 mg daily, Mylanta and Bentyl as needed  2  Tobacco abuse  Patient is on nicotine transdermal patch 21 mg/24 hr, nicotine gum as needed  3  Hyperlipidemia  Continue fenofibrate 145 mg daily  4  Constipation  Patient has been put on Colace daily, MiraLax as needed  5  Asthma  Patient may use albuterol inhaler as needed  6  DDD/OA  Patient may take Tylenol and use Voltaren gel as needed  Discontinue Flexeril and trial Robaxin  7  Nasal congestion  Zyrtec not available in hospital   Patient placed on Claritin 10 mg daily and Flonase nasal spray twice daily  8  Vitamin-D deficiency  Patient started on vitamin D3 1000 units daily  9  Acute hemorrhoid pain  I will put the patient on Anusol cream p r n  [place]

## 2022-11-14 NOTE — PLAN OF CARE
Problem: Ineffective Coping  Goal: Participates in unit activities  Description: Interventions:  - Provide therapeutic environment   - Provide required programming   - Redirect inappropriate behaviors   Outcome: Progressing   Patient out for groups and minimally interactive with staff and peers

## 2022-11-14 NOTE — PLAN OF CARE
Problem: Ineffective Coping  Goal: Identifies ineffective coping skills  Outcome: Progressing     Problem: Risk for Self Injury/Neglect  Goal: Treatment Goal: Remain safe during length of stay, learn and adopt new coping skills, and be free of self-injurious ideation, impulses and acts at the time of discharge  Outcome: Progressing  Goal: Refrain from harming self  Description: Interventions:  - Monitor patient closely, per order  - Develop a trusting relationship  - Supervise medication ingestion, monitor effects and side effects   Outcome: Progressing  Goal: Recognize maladaptive responses and adopt new coping mechanisms  Outcome: Progressing     Problem: Depression  Goal: Verbalize thoughts and feelings  Description: Interventions:  - Assess and re-assess patient's level of risk   - Engage patient in 1:1 interactions, daily, for a minimum of 15 minutes   - Encourage patient to express feelings, fears, frustrations, hopes   Outcome: Progressing  Goal: Complete daily ADLs, including personal hygiene independently, as able  Description: Interventions:  - Observe, teach, and assist patient with ADLS  -  Monitor and promote a balance of rest/activity, with adequate nutrition and elimination   Outcome: Progressing     Problem: Anxiety  Goal: Anxiety is at manageable level  Description: Interventions:  - Assess and monitor patient's anxiety level  - Monitor for signs and symptoms (heart palpitations, chest pain, shortness of breath, headaches, nausea, feeling jumpy, restlessness, irritable, apprehensive)  - Collaborate with interdisciplinary team and initiate plan and interventions as ordered    - [place] patient to unit/surroundings  - Explain treatment plan  - Encourage participation in care  - Encourage verbalization of concerns/fears  - Identify coping mechanisms  - Assist in developing anxiety-reducing skills  - Administer/offer alternative therapies  - Limit or eliminate stimulants  Outcome: Progressing

## 2022-11-14 NOTE — PROGRESS NOTES
11/14/22 0741   Activity/Group Checklist   Group Community meeting   Attendance Attended   Attendance Duration (min) 31-45  (Pt in and out of grp)   Interactions Interacted appropriately   Affect/Mood Appropriate;Blunted/flat   Goals Achieved Identified feelings; Able to listen to others; Able to engage in interactions; Able to self-disclose; Able to recieve feedback

## 2022-11-14 NOTE — PROGRESS NOTES
Progress Note - Behavioral Health   Felecia Morin 47 y o  female MRN: [id number]  Unit/Bed#: [facility] 204-01 Encounter: [id number]    Assessment/Plan   Principal Problem:    Schizoaffective disorder, bipolar type ([address] )  Active Problems:    LYNN (generalized anxiety disorder)    Degenerative disc disease, lumbar    Post-traumatic stress disorder, chronic    Mild intermittent asthma without complication    Tobacco abuse    Gastroesophageal reflux disease    Emphysema lung (HCC)    Recommended Treatment:   Increase Effexor to 75 mg for depressive symptoms  All current active medications have been reviewed  Encourage group therapy, milieu therapy and occupational therapy  Behavioral Health checks every 7 minutes  Medical management per SLIM    Legal Status: 201  ----------------------------------------      Subjective:  Patient was seen and examined today at the bedside during morning rounds  Patient tolerated the discontinuation of Cogentin without rebound extrapyramidal symptoms  She also reported that sialorrhea improved after starting glycopyrrolate    She expressed depressive emotions and I increase the Effexor today to 75 mg    Behavior over the last 24 hours:  unchanged  Sleep: normal  Appetite: increased  Medication side effects:  Sialorrhea  ROS: constipation and all other systems are negative    Mental Status Evaluation:  Appearance:  age appropriate, casually dressed   Behavior:  demanding, guarded   Speech:  pressured, hypertalkative   Mood:  anxious, labile, irritable   Affect:  expansive   Thought Process:  increased rate of thoughts   Associations: tangential associations   Thought Content:  paranoid ideation   Perceptual Disturbances: denies auditory hallucinations when asked   Risk Potential: Suicidal ideation - None at present  Homicidal ideation - None at present  Potential for aggression - No   Sensorium:  oriented to person, place and time/date   Memory:  recent and remote memory: unable to assess due to lack of cooperation   Consciousness:  alert and awake   Attention/Concentration: attention span and concentration are age appropriate   Insight:  impaired due to psychosis   Judgment: impaired due to psychosis   Gait/Station: normal gait/station   Motor Activity: no abnormal movements     Medications:   all current active meds have been reviewed, current meds:   Current Facility-Administered Medications   Medication Dose Route Frequency   • acetaminophen (TYLENOL) tablet 650 mg  650 mg Oral Q4H PRN   • acetaminophen (TYLENOL) tablet 650 mg  650 mg Oral Q4H PRN   • acetaminophen (TYLENOL) tablet 975 mg  975 mg Oral Q6H PRN   • albuterol (PROVENTIL HFA,VENTOLIN HFA) inhaler 2 puff  2 puff Inhalation Q6H PRN   • aluminum-magnesium hydroxide-simethicone (MYLANTA) oral suspension 30 mL  30 mL Oral Q4H PRN   • artificial tear (LUBRIFRESH P M ) ophthalmic ointment   Both Eyes HS PRN   • cholecalciferol (VITAMIN D3) tablet 1,000 Units  1,000 Units Oral Daily   • cloZAPine (CLOZARIL) tablet 100 mg  100 mg Oral BID   • Diclofenac Sodium (VOLTAREN) 1 % topical gel 2 g  2 g Topical 4x Daily PRN   • dicyclomine (BENTYL) tablet 20 mg  20 mg Oral 4x Daily PRN   • docusate sodium (COLACE) capsule 100 mg  100 mg Oral Daily   • fenofibrate (TRICOR) tablet 145 mg  145 mg Oral Daily   • fluticasone (FLONASE) 50 mcg/act nasal spray 1 spray  1 spray Each Nare BID   • glycopyrrolate (ROBINUL) tablet 1 mg  1 mg Oral BID   • haloperidol lactate (HALDOL) injection 5 mg  5 mg Intramuscular Q4H PRN Max 4/day   • hydrOXYzine HCL (ATARAX) tablet 50 mg  50 mg Oral Q6H PRN Max 4/day   • loratadine (CLARITIN) tablet 10 mg  10 mg Oral Daily   • LORazepam (ATIVAN) tablet 0 5 mg  0 5 mg Oral Q8H PRN   • LORazepam (ATIVAN) tablet 1 mg  1 mg Oral Q8H PRN   • methocarbamol (ROBAXIN) tablet 500 mg  500 mg Oral Q6H PRN   • nicotine (NICODERM CQ) 21 mg/24 hr TD 24 hr patch 1 patch  1 patch Transdermal Daily   • nicotine polacrilex (NICORETTE) gum 4 mg  4 mg Oral Q2H PRN   • OLANZapine (ZyPREXA) tablet 2 5 mg  2 5 mg Oral TID PRN   • OLANZapine (ZyPREXA) tablet 5 mg  5 mg Oral Q6H PRN Max 3/day   • pantoprazole (PROTONIX) EC tablet 40 mg  40 mg Oral Early Morning   • polyethylene glycol (MIRALAX) packet 17 g  17 g Oral Daily PRN   • traZODone (DESYREL) tablet 50 mg  50 mg Oral HS PRN   • [START ON 11/14/2022] venlafaxine (EFFEXOR-XR) 24 hr capsule 75 mg  75 mg Oral Daily        Current Facility-Administered Medications   Medication Dose Route Frequency Provider Last Rate   • acetaminophen  650 mg Oral Q4H PRN Payton Conrad PA-C     • acetaminophen  650 mg Oral Q4H PRN Payton Conrad PA-C     • acetaminophen  975 mg Oral Q6H PRN Payton Conrad PA-C     • albuterol  2 puff Inhalation Q6H PRN Luis Angel Stewart MD     • aluminum-magnesium hydroxide-simethicone  30 mL Oral Q4H PRN Amparo Molina MD     • artificial tear   Both Eyes HS PRN Payton Conrad PA-C     • cholecalciferol  1,000 Units Oral Daily Payton Conrad PA-C     • cloZAPine  100 mg Oral BID JARED [place]     • Diclofenac Sodium  2 g Topical 4x Daily PRN Luis Angel Stewart MD     • dicyclomine  20 mg Oral 4x Daily PRN Payton Conrad PA-C     • docusate sodium  100 mg Oral Daily Luis Angel Stewart MD     • fenofibrate  145 mg Oral Daily Payton Conrad PA-C     • fluticasone  1 spray Each Nare BID Payton Conrad PA-C     • glycopyrrolate  1 mg Oral BID [facility] Mohsen Romero MD     • haloperidol lactate  5 mg Intramuscular Q4H PRN Max 4/day Amparo Molina MD     • hydrOXYzine HCL  50 mg Oral Q6H PRN Max 4/day Tan Corral MD     • loratadine  10 mg Oral Daily Payton Conrad PA-C     • LORazepam  0 5 mg Oral Q8H PRN Tan Corral MD     • LORazepam  1 mg Oral Q8H PRN Tan Corral MD     • methocarbamol  500 mg Oral Q6H PRN Payton Conrad PA-C     • nicotine  1 patch Transdermal Daily Payton Conrad PA-C     • nicotine polacrilex  4 mg Oral Q2H PRN Payton GRANADOS MARTINE Conrad     • OLANZapine  2 5 mg Oral TID PRN Fabiola Diego, CRNP     • OLANZapine  5 mg Oral Q6H PRN Max 3/day JARED [place]     • pantoprazole  40 mg Oral Early Morning Payton Conrad PA-C     • polyethylene glycol  17 g Oral Daily PRN Payton Conrad PA-C     • traZODone  50 mg Oral HS PRN Eugenia Castillo MD     • [START ON 11/14/2022] venlafaxine  75 mg Oral Daily Eugenia Castillo MD         Labs: I have personally reviewed all pertinent laboratory/tests results  Most Recent Labs:   Lab Results   Component Value Date    WBC 8 32 11/09/2022    RBC 4 95 11/09/2022    HGB 14 1 11/09/2022    HCT 44 6 11/09/2022     11/09/2022    RDW 15 2 (H) 11/09/2022    NEUTROABS 5 98 11/09/2022    SODIUM 138 11/09/2022    K 4 0 11/09/2022     11/09/2022    CO2 29 11/09/2022    BUN 7 11/09/2022    CREATININE 0 64 11/09/2022    GLUC 95 11/09/2022    CALCIUM 9 4 11/09/2022    AST 13 11/07/2022    ALT 17 11/07/2022    ALKPHOS 77 11/07/2022    TP 7 4 11/07/2022    ALB 4 3 11/07/2022    TBILI 0 28 11/07/2022    CHOLESTEROL 238 (H) 11/09/2022    HDL 47 (L) 11/09/2022    TRIG 263 (H) 11/09/2022    LDLCALC 138 (H) 11/09/2022    NONHDLC 191 11/09/2022    AMMONIA 28 10/27/2017    [id number] 2 050 11/07/2022    FREET4 0 89 03/24/2016    PREGUR Negative 11/07/2022    PREGSERUM Negative 10/21/2019    HCG <2 00 04/10/2014    RPR Non-Reactive 11/09/2022    HGBA1C 6 1 (H) 06/18/2022     06/18/2022       Progress Toward Goals: minimal improvement    Risks / Benefits of Treatment:    Risks, benefits, and possible side effects of medications explained to patient  Patient has limited understanding of risks and benefits of treatment at this time, but agrees to take medications as prescribed  Counseling / Coordination of Care: Total floor / unit time spent today 35 minutes   Greater than 50% of total time was spent with the patient and / or family counseling and / or coordination of care  A description of counseling / coordination of care:  Patient's progress discussed with staff in treatment team meeting  Medications, treatment progress and treatment plan reviewed with patient  Recent stressors including limited support and noncompliance with treatment discussed with patient  Educated on importance of medication and treatment compliance  Reassurance and supportive therapy provided  Encouraged participation in milieu and group therapy on the unit      Mariaa Jensen MD 11/13/22

## 2022-11-14 NOTE — NURSING NOTE
Pt present and visible in the milieu during group and unit activities  Pt reported anxiety and depression but at a moderate level and manageable  Pt did report hallucinations, stating they were auditory and she hears them most of the time  Pt did not request PRNs other than Anusol for hemorrhoids  Pt was calm and cooperative and compliant with all medications and meals  Pt brightens on approach and was not shouting out or internally preoccupied during the shift  Pt denies si, hi  No additional complaints or concerns at this time  Continuous visual safety checks performed staggered q7 minutes  All safety precautions are in place  No acute concerns at this time  Will continue to monitor

## 2022-11-14 NOTE — PROGRESS NOTES
Progress Note - Behavioral Health   Tianna Vásquez 47 y o  female MRN: [id number]  [facility] Encounter: [id number]    Patient was seen today for continuation of care, records reviewed and patient was discussed with the morning case review team   Per staff, patient is visible on the milieu  She remains bizarre with ongoing hallucinations and delusions  Shelli Gutiérrez was seen today for psychiatric follow-up  On assessment today, Shelli Gutiérrez was seen in her room  Last BM on yesterday 11/13/2022  She reports ongoing tactile, olfactory and gustatory hallucinations  She states that she still feels that she is being hypnotized by people on the unit but she feels better when nursing staff reassures her that this is a delusion and not real   She does appear internally preoccupied  She is gaining some insight to her ongoing symptoms  She feels her mood has improved somewhat since she has been on the unit but continues to be depressed and feels she would not be safe if she left the hospital   She is requesting a dose of Clozaril mid day because she feels she has more psychotic symptoms in the afternoon than any other time of day  These afternoon symptoms are corroborated but nursing notes  We agreed to add an afternoon dose of Clozaril and slowly titrate to improve ongoing symptoms of psychosis  Shelli Gutiérrez denies acute suicidal/self-harm ideation/intent/plan upon direct inquiry at this time  She contracts for safety on the unit  Shelli Gutiérrez occasionally has yelling outbursts in her room  Shelli Gutiérrez denies HI and VH, and does not appear overtly manic  She continues to have bizarre delusions and can be paranoid at times   Shelli Gutiérrez remains adherent to her current psychotropic medication regimen and denies any side effects from medications, as well as none noted on exam     Vitals:  Vitals:    11/14/22 0808   BP: 104/73   Pulse: 82   Resp: 18   Temp: 97 5 °F (36 4 °C)   SpO2: 92%       Laboratory Results:    I have personally reviewed all pertinent laboratory/tests results  Most Recent Labs:   Lab Results   Component Value Date    WBC 8 32 11/09/2022    RBC 4 95 11/09/2022    HGB 14 1 11/09/2022    HCT 44 6 11/09/2022     11/09/2022    RDW 15 2 (H) 11/09/2022    NEUTROABS 5 98 11/09/2022    SODIUM 138 11/09/2022    K 4 0 11/09/2022     11/09/2022    CO2 29 11/09/2022    BUN 7 11/09/2022    CREATININE 0 64 11/09/2022    GLUC 95 11/09/2022    GLUF 129 (H) 04/25/2022    CALCIUM 9 4 11/09/2022    AST 13 11/07/2022    ALT 17 11/07/2022    ALKPHOS 77 11/07/2022    TP 7 4 11/07/2022    ALB 4 3 11/07/2022    TBILI 0 28 11/07/2022    CHOLESTEROL 238 (H) 11/09/2022    HDL 47 (L) 11/09/2022    TRIG 263 (H) 11/09/2022    LDLCALC 138 (H) 11/09/2022    NONHDLC 191 11/09/2022    AMMONIA 28 10/27/2017    [id number] 2 050 11/07/2022    FREET4 0 89 03/24/2016    PREGUR Negative 11/07/2022    PREGSERUM Negative 10/21/2019    HCG <2 00 04/10/2014    RPR Non-Reactive 11/09/2022    HGBA1C 6 1 (H) 06/18/2022     06/18/2022       Psychiatric Review of Systems:  Behavior over the last 24 hours:  improved     Sleep:   Appetite:   Medication side effects:   ROS: no complaints, denies shortness of breath or chest pain and all other systems are negative for acute changes    Mental Status Evaluation:  Appearance:  casually dressed, adequate grooming   Behavior:  cooperative, slightly less bizarre   Speech:  normal rate and volume   Mood:  depressed   Affect:  constricted   Thought Process:  less disorganized   Thought Content:  persecutory and bizarre delusions, remains preoccupied, no overt paranoia noted on exam   Perceptual Disturbances: auditory hallucinations, appears distracted, olfactory, tactile and gustatory hallucinations   Risk Potential: Suicidal ideation - contracts for safety on the unit, would talk to staff if not feeling safe on the unit  Homicidal ideation - None at present  Potential for aggression - No   Memory:  recent and remote memory grossly intact   Sensorium  person, place and situation      Consciousness:  alert and awake   Attention: attention span and concentration appear shorter than expected for age   Insight:  improving   Judgment: improving   Gait/Station: normal gait/station   Motor Activity: no abnormal movements   Progress Toward Goals:   [facility] is progressing towards goals of inpatient psychiatric treatment by continued medication compliance and is attending therapeutic modalities on the milieu  However, the patient continues to require inpatient psychiatric hospitalization for continued medication management and titration to optimize symptom reduction, improve sleep hygiene, and demonstrate adequate self-care  Assessment/Plan   Principal Problem:    Schizoaffective disorder, bipolar type ([facility])  Active Problems:    LYNN (generalized anxiety disorder)    Degenerative disc disease, lumbar    Post-traumatic stress disorder, chronic    Mild intermittent asthma without complication    Tobacco abuse    Gastroesophageal reflux disease    Emphysema lung ([facility] [address] )      Recommended Treatment: Treatment plan and medication changes discussed and per the attending physician the plan is: 1  Continue with group therapy, milieu therapy and occupational therapy  2  Behavioral Health checks every 7 minutes  3  Continue frequent safety checks and vitals per unit protocol  4  Continue with SLIM medical management as indicated  5  Continue with current medication regimen  6  Will review labs in the a m  7 Disposition Planning: Discharge planning and efforts remain ongoing    Behavioral Health Medications: all current active meds have been reviewed and planned medication changes: Change Clozaril dosing to 100mg daily at 900, 25 mg at 1500 and 100mg at 2200    Current Facility-Administered Medications   Medication Dose Route Frequency Provider Last Rate   • acetaminophen  650 mg Oral Q4H PRN Payton Conrad PA-C     • acetaminophen  650 mg Oral Q4H PRN Payton Conrad PA-C     • acetaminophen  975 mg Oral Q6H PRN Payton Conrad PA-C     • albuterol  2 puff Inhalation Q6H PRN Amelia Moulton MD     • aluminum-magnesium hydroxide-simethicone  30 mL Oral Q4H PRN Lorrie Sandhu MD     • artificial tear   Both Eyes HS PRN Payton Conrad PA-C     • cholecalciferol  1,000 Units Oral Daily Payton Conrad PA-C     • cloZAPine  100 mg Oral BID JARED [place]     • Diclofenac Sodium  2 g Topical 4x Daily PRN Amelia Moulton MD     • dicyclomine  20 mg Oral 4x Daily PRN Payton Conrad PA-C     • docusate sodium  100 mg Oral Daily Amelia Moulton MD     • fenofibrate  145 mg Oral Daily Payton Conrad PA-C     • fluticasone  1 spray Each Nare BID Payton Conrad PA-C     • glycopyrrolate  1 mg Oral BID [facility] Biju Friedman MD     • haloperidol lactate  5 mg Intramuscular Q4H PRN Max 4/day Lorrie Sandhu MD     • hydrocortisone   Topical 4x Daily PRN Amelia Moulton MD     • hydrOXYzine HCL  50 mg Oral Q6H PRN Max 4/day Jaye Collazo MD     • loratadine  10 mg Oral Daily Payton Conrad PA-C     • LORazepam  0 5 mg Oral Q8H PRN Jaye Collazo MD     • LORazepam  1 mg Oral Q8H PRN Jaye Collazo MD     • methocarbamol  500 mg Oral Q6H PRN Payton Conrad PA-C     • nicotine  1 patch Transdermal Daily Payton Conrad PA-C     • nicotine polacrilex  4 mg Oral Q2H PRN Payton Conrad PA-C     • OLANZapine  2 5 mg Oral TID PRN JARED Hobson     • OLANZapine  5 mg Oral Q6H PRN Max 3/day JARED [place]     • pantoprazole  40 mg Oral Early Morning Payton Conrad PA-C     • polyethylene glycol  17 g Oral Daily PRN Payton Conrad PA-C     • traZODone  50 mg Oral HS PRN Sabrina Silverman MD     • venlafaxine  75 mg Oral Daily Sabrina Silverman MD         Risks / Benefits of Treatment:  Risks, benefits, and possible side effects of medications explained to patient and patient verbalizes understanding and agreement for treatment  Counseling / Coordination of Care:  Patient's progress reviewed with nursing staff  Medications, treatment progress and treatment plan reviewed with patient  Supportive counseling provided to the patient  Total floor/unit time spent today 25 minutes  Greater than 50% of total time was spent with the patient and / or family counseling and / or coordination of care  A description of the counseling / coordination of care: medication education, treatment plan, supportive therapy

## 2022-11-15 RX ORDER — METHOCARBAMOL 500 MG/1
500 TABLET, FILM COATED ORAL EVERY 6 HOURS PRN
Qty: 90 TABLET | Refills: 0 | Status: SHIPPED | OUTPATIENT
Start: 2022-11-15

## 2022-11-15 RX ORDER — NICOTINE 21 MG/24HR
1 PATCH, TRANSDERMAL 24 HOURS TRANSDERMAL DAILY
Qty: 28 PATCH | Refills: 0 | Status: SHIPPED | OUTPATIENT
Start: 2022-11-16

## 2022-11-15 RX ORDER — FLUTICASONE PROPIONATE 50 MCG
1 SPRAY, SUSPENSION (ML) NASAL 2 TIMES DAILY
Qty: 16 G | Refills: 0 | Status: SHIPPED | OUTPATIENT
Start: 2022-11-15

## 2022-11-15 RX ORDER — CLOZAPINE 25 MG/1
50 TABLET ORAL DAILY
Status: DISCONTINUED | OUTPATIENT
Start: 2022-11-15 | End: 2022-11-18 | Stop reason: HOSPADM

## 2022-11-15 RX ORDER — ALBUTEROL SULFATE 2.5 MG/3ML
2.5 SOLUTION RESPIRATORY (INHALATION) EVERY 6 HOURS PRN
Qty: 30 ML | Refills: 0 | Status: SHIPPED | OUTPATIENT
Start: 2022-11-15

## 2022-11-15 RX ORDER — GLYCOPYRROLATE 1 MG/1
1 TABLET ORAL 2 TIMES DAILY
Qty: 60 TABLET | Refills: 0 | Status: SHIPPED | OUTPATIENT
Start: 2022-11-15

## 2022-11-15 RX ORDER — ALBUTEROL SULFATE 90 UG/1
2 AEROSOL, METERED RESPIRATORY (INHALATION) EVERY 6 HOURS PRN
Qty: 18 G | Refills: 0 | Status: SHIPPED | OUTPATIENT
Start: 2022-11-15

## 2022-11-15 RX ORDER — POLYETHYLENE GLYCOL 3350 17 G/17G
17 POWDER, FOR SOLUTION ORAL DAILY PRN
Qty: 100 EACH | Refills: 0 | Status: SHIPPED | OUTPATIENT
Start: 2022-11-15

## 2022-11-15 RX ORDER — PANTOPRAZOLE SODIUM 40 MG/1
40 TABLET, DELAYED RELEASE ORAL
Qty: 30 TABLET | Refills: 0 | Status: SHIPPED | OUTPATIENT
Start: 2022-11-16

## 2022-11-15 RX ORDER — DICYCLOMINE HCL 20 MG
20 TABLET ORAL 4 TIMES DAILY PRN
Qty: 90 TABLET | Refills: 0 | Status: SHIPPED | OUTPATIENT
Start: 2022-11-15

## 2022-11-15 RX ORDER — HYDROCORTISONE 25 MG/G
CREAM TOPICAL 4 TIMES DAILY PRN
Qty: 28 G | Refills: 0 | Status: SHIPPED | OUTPATIENT
Start: 2022-11-15

## 2022-11-15 RX ORDER — FENOFIBRATE 145 MG/1
145 TABLET, COATED ORAL DAILY
Qty: 30 TABLET | Refills: 0 | Status: SHIPPED | OUTPATIENT
Start: 2022-11-15

## 2022-11-15 RX ORDER — MELATONIN
1000 DAILY
Qty: 30 TABLET | Refills: 0 | Status: SHIPPED | OUTPATIENT
Start: 2022-11-16

## 2022-11-15 RX ORDER — CETIRIZINE HYDROCHLORIDE 10 MG/1
10 TABLET ORAL DAILY
Qty: 30 TABLET | Refills: 0 | Status: SHIPPED | OUTPATIENT
Start: 2022-11-15

## 2022-11-15 RX ADMIN — GLYCOPYRROLATE 1 MG: 1 TABLET ORAL at 16:59

## 2022-11-15 RX ADMIN — LORAZEPAM 1 MG: 1 TABLET ORAL at 10:39

## 2022-11-15 RX ADMIN — LORAZEPAM 0.5 MG: 0.5 TABLET ORAL at 21:32

## 2022-11-15 RX ADMIN — CHOLECALCIFEROL TAB 25 MCG (1000 UNIT) 1000 UNITS: 25 TAB at 08:08

## 2022-11-15 RX ADMIN — GLYCOPYRROLATE 1 MG: 1 TABLET ORAL at 08:08

## 2022-11-15 RX ADMIN — DOCUSATE SODIUM 100 MG: 100 CAPSULE, LIQUID FILLED ORAL at 08:08

## 2022-11-15 RX ADMIN — LORATADINE 10 MG: 10 TABLET ORAL at 08:09

## 2022-11-15 RX ADMIN — VENLAFAXINE HYDROCHLORIDE 75 MG: 75 CAPSULE, EXTENDED RELEASE ORAL at 08:08

## 2022-11-15 RX ADMIN — TRAZODONE HYDROCHLORIDE 50 MG: 50 TABLET ORAL at 21:32

## 2022-11-15 RX ADMIN — FLUTICASONE PROPIONATE 1 SPRAY: 50 SPRAY, METERED NASAL at 08:10

## 2022-11-15 RX ADMIN — NICOTINE 1 PATCH: 21 PATCH, EXTENDED RELEASE TRANSDERMAL at 10:39

## 2022-11-15 RX ADMIN — CLOZAPINE 100 MG: 100 TABLET ORAL at 08:09

## 2022-11-15 RX ADMIN — CLOZAPINE 50 MG: 25 TABLET ORAL at 14:59

## 2022-11-15 RX ADMIN — FLUTICASONE PROPIONATE 1 SPRAY: 50 SPRAY, METERED NASAL at 16:59

## 2022-11-15 RX ADMIN — CLOZAPINE 100 MG: 100 TABLET ORAL at 21:32

## 2022-11-15 RX ADMIN — FENOFIBRATE 145 MG: 145 TABLET, COATED ORAL at 08:08

## 2022-11-15 NOTE — NURSING NOTE
Patient appears to have slept through the overnight hours without issue  No complaints voiced  No acute behaviors observed  She remains in bed sleeping at this time  Continuous safety rounding in progress

## 2022-11-15 NOTE — PROGRESS NOTES
11/15/22 0793   Activity/Group Checklist   Group Community meeting   Attendance Attended   Attendance Duration (min) 46-60   Interactions Interacted appropriately   Affect/Mood Appropriate   Goals Achieved Identified feelings; Able to listen to others; Able to engage in interactions; Able to reflect/comment on own behavior;Able to self-disclose;Verbalized increased hopefulness; Able to recieve feedback

## 2022-11-15 NOTE — PROGRESS NOTES
Patient arrives with c/o sore throat x 2 days. Emergency Department Nursing Plan of Care       The Nursing Plan of Care is developed from the Nursing assessment and Emergency Department Attending provider initial evaluation. The plan of care may be reviewed in the ED Provider note.     The Plan of Care was developed with the following considerations:   Patient / Family readiness to learn indicated by:verbalized understanding  Persons(s) to be included in education: patient  Barriers to Learning/Limitations:No    Signed     Jeaneth Mcelroy RN    10/5/2021   9:13 AM Progress Note - Damion Bolaños 47 y o  female MRN: [id number]    Unit/Bed#[facility] 204-01 Encounter: [id number]        Subjective:   Patient seen and examined at bedside after reviewing the chart and discussing the case with the caring staff  Patient examined at bedside  Patient reports pain in her hemorrhoids  Patient is scheduled for discharge on Friday 11/18/2022  Patient is requesting all his prescriptions  I reviewed and reconciled patient's problem list and medications  Physical Exam   Vitals: Blood pressure 118/76, pulse 98, temperature 98 2 °F (36 8 °C), temperature source Temporal, resp  rate 16, height 5' 1" (1 549 m), weight 68 8 kg (151 lb 9 6 oz), SpO2 96 %, not currently breastfeeding  ,Body mass index is 28 64 kg/m²  Constitutional: Patient in no acute distress  HEENT: PERR, EOMI, MMM, rhinorrhea  Cardiovascular: Normal rate and regular rhythm  Pulmonary/Chest: Effort normal and breath sounds normal    Abdomen:  Non distended  Assessment/Plan:  Damion Bolaños is a(n) 47y o  year old female with schizoaffective disorder  MEDICAL CLEARANCE  Patient is medically cleared for discharge  All scripts will be sent out for the patient      1  GERD/abd cramping  Continue Protonix 40 mg daily, Mylanta and Bentyl as needed  2  Tobacco abuse  Patient is on nicotine transdermal patch 21 mg/24 hr, nicotine gum as needed  3  Hyperlipidemia  Continue fenofibrate 145 mg daily  4  Constipation  Patient has been put on Colace daily, MiraLax as needed  5  Asthma  Patient may use albuterol inhaler as needed  6  DDD/OA  Patient may take Tylenol and use Voltaren gel as needed  Discontinue Flexeril and trial Robaxin  7  Nasal congestion  Zyrtec not available in hospital   Patient placed on Claritin 10 mg daily and Flonase nasal spray twice daily  8  Vitamin-D deficiency  Patient started on vitamin D3 1000 units daily  9  Acute hemorrhoid pain    I will put the patient on Anusol cream p r n  Ronni Simple

## 2022-11-15 NOTE — PROGRESS NOTES
11/15/22 1045   Activity/Group Checklist   Group   (DERS)   Attendance Attended   Attendance Duration (min) 0-15   Interactions Interacted appropriately   Affect/Mood Appropriate   Goals Achieved Identified feelings; Able to listen to others; Able to engage in interactions; Able to self-disclose; Able to recieve feedback; Discussed coping strategies   Patient agreeable to meeting with RT to complete DERS  Patient feels like she is doing better and has been getting more sleep  She is brighter and more comfortable in conversation

## 2022-11-15 NOTE — NURSING NOTE
Patient approached nurse asking for PRN Ativan due to racing thoughts, fear of not knowing of what is to come, and wants to know if the voices are going to go away as this is causing increased anxiety, support provided  PRN Ativan given as ordered  Remains on 7" checks for safety and behaviors

## 2022-11-15 NOTE — NURSING NOTE
Patient requested and received PRN Ativan 1mg as per order for severe anxiety  Performed a Martines Scale rating with a result of 26   Will monitor for medication effectiveness

## 2022-11-15 NOTE — NURSING NOTE
Patient expressed relief from anxiety S/P PRN Ativan  Remains pleasant and cooperative in interaction  Remains on 7" checks for safety and behaviors

## 2022-11-15 NOTE — PLAN OF CARE
Problem: Risk for Self Injury/Neglect  Goal: Refrain from harming self  Description: Interventions:  - Monitor patient closely, per order  - Develop a trusting relationship  - Supervise medication ingestion, monitor effects and side effects   Outcome: Progressing     Problem: Depression  Goal: Verbalize thoughts and feelings  Description: Interventions:  - Assess and re-assess patient's level of risk   - Engage patient in 1:1 interactions, daily, for a minimum of 15 minutes   - Encourage patient to express feelings, fears, frustrations, hopes   Outcome: Progressing

## 2022-11-15 NOTE — NURSING NOTE
PRN Ativan given this evening was effective in relieving anxiety for patient  She endorses depression 7-8/10, denies SI, HI  Endorses mild hallucinations of voices and states she is able to taste and smell dead animals  Patient was medication compliant at HS  She also requested and received PRN Trazodone 50 mg at HS for inability to sleep  She also expressed concern about a scratch to the corner of her right eye being infected; states it was scratch 2 5 weeks ago by a pet cat  This writer did not observe any scratches or open areas; no pinkened or reddened areas observed  Encouraged patient to speak with the medical provider tomorrow for further concerns  She has remained withdrawn to her room this evening  Continuous safety rounding in progress

## 2022-11-15 NOTE — PROGRESS NOTES
11/15/22   Team Meeting   Meeting Type Daily Rounds   Team Members Present   Team Members Present Physician;Nurse;; Occupational Therapist;   Physician Team Member Dr Tami Cabrera MD   Nursing Team Member Atif JIMENES; Latasha Dangelo RN, [facility]       Social Work Team Member [id number]uf   OT Team Member Mino Alvarado   Patient/Family Present   Patient Present No   Patient's Family Present No     Pt slept last night  No yelling bx  Pt's anx and dep 7 - 8/10, denies S/I and H/I  Pt affect flat this am, internally preoccupied  A/H and olfactory hallucinations  Pt to discharge Friday, 11/18/22

## 2022-11-15 NOTE — PROGRESS NOTES
Progress Note - Behavioral Health   Georgina Padilla 47 y o  female MRN: [id number]  Unit/Bed#Anna Batista 204-01 Encounter: [id number]    Patient was seen today for continuation of care, records reviewed and patient was discussed with the morning case review team   Per staff, patient is pleasant and cooperative  She remains internally preoccupied  No acute events over the past 24 hours  Shanelle Farnsworth was seen today for psychiatric follow-up  On assessment today, Shanelle Farnsworth was seen resting in her room  She calm and cooperative with a flat affect  She occasionally appears internally preoccupied  She reports she feels "better"  She has continued auditory and olfactory hallucinations  She states in the morning she feels as if people may "hypnotize" her but she is able to now remember that this is not real   She has improved insight to her mental health diagnosis  She reports sleeping better and her appetite is fair  She feels the afternoon dose of clozapine has helped improve her hallucinations and delusions  Sylvia Bowers is agreeable to returning to the same group home  She recognizes that her delusions often make her think people in the group home are "forcing me to do things like over eat"  She then stated "that just may be part of my mental health problem though"  Sylvia Bowers is agreeable to an increase in the afternoon dose of Clozaril to improve ongoing symptoms of psychosis  She reports feeling safe of the unit and contracts for safety  Shanelle Farnsworth denies acute suicidal/self-harm ideation/intent/plan upon direct inquiry at this time  Shanelle Farnsworth remains behaviorally appropriate, no agitation or aggression noted on exam or in report  [facility] denies homicidal ideation  She continues to have some persecutory delusions which are improving and some paranoia   Werner Delcid remains adherent to her current psychotropic medication regimen and denies any side effects from medications, as well as none noted on exam     Vitals:  Vitals:    11/15/22 0744   BP: 118/76   Pulse: 98   Resp: 16   Temp: 98 2 °F (36 8 °C)   SpO2: 96%       Laboratory Results:    I have personally reviewed all pertinent laboratory/tests results  Most Recent Labs:   Lab Results   Component Value Date    WBC 8 32 11/09/2022    RBC 4 95 11/09/2022    HGB 14 1 11/09/2022    HCT 44 6 11/09/2022     11/09/2022    RDW 15 2 (H) 11/09/2022    NEUTROABS 5 98 11/09/2022    SODIUM 138 11/09/2022    K 4 0 11/09/2022     11/09/2022    CO2 29 11/09/2022    BUN 7 11/09/2022    CREATININE 0 64 11/09/2022    GLUC 95 11/09/2022    GLUF 129 (H) 04/25/2022    CALCIUM 9 4 11/09/2022    AST 13 11/07/2022    ALT 17 11/07/2022    ALKPHOS 77 11/07/2022    TP 7 4 11/07/2022    ALB 4 3 11/07/2022    TBILI 0 28 11/07/2022    CHOLESTEROL 238 (H) 11/09/2022    HDL 47 (L) 11/09/2022    TRIG 263 (H) 11/09/2022    LDLCALC 138 (H) 11/09/2022    NONHDLC 191 11/09/2022    AMMONIA 28 10/27/2017    [id number] 2 050 11/07/2022    FREET4 0 89 03/24/2016    PREGUR Negative 11/07/2022    PREGSERUM Negative 10/21/2019    HCG <2 00 04/10/2014    RPR Non-Reactive 11/09/2022    HGBA1C 6 1 (H) 06/18/2022     06/18/2022       Psychiatric Review of Systems:  Behavior over the last 24 hours:  improved     Sleep: improving  Appetite: fair  Medication side effects: denies and none noted on exam  ROS: no complaints, denies shortness of breath or chest pain and all other systems are negative for acute changes    Mental Status Evaluation:  Appearance:  casually dressed, adequate grooming   Behavior:  calm, guarded   Speech:  less disorganized    Mood:  slightly less anxious   Affect:  flat   Thought Process:  goal directed, more organized   Thought Content:  persecutory delusions, some paranoia noted on exam   Perceptual Disturbances: auditory hallucinations without commands, olfactory hallucinations   Risk Potential: Suicidal ideation - None at present  Homicidal ideation - None at present  Potential for aggression - No   Memory:  recent and remote memory grossly intact   Sensorium  person, place and situation      Consciousness:  alert and awake   Attention: attention span and concentration are age appropriate   Insight:  improving   Judgment: improving   Gait/Station: normal gait/station   Motor Activity: no abnormal movements   Progress Toward Goals:   Lien Pillai is progressing towards goals of inpatient psychiatric treatment by continued medication compliance and is attending therapeutic modalities on the milieu  However, the patient continues to require inpatient psychiatric hospitalization for continued medication management and titration to optimize symptom reduction, improve sleep hygiene, and demonstrate adequate self-care  Assessment/Plan   Principal Problem:    Schizoaffective disorder, bipolar type ([facility])  Active Problems:    LYNN (generalized anxiety disorder)    Degenerative disc disease, lumbar    Post-traumatic stress disorder, chronic    Mild intermittent asthma without complication    Tobacco abuse    Gastroesophageal reflux disease    Emphysema lung ([facility] [address] )      Recommended Treatment: Treatment plan and medication changes discussed and per the attending physician the plan is: 1  Continue with group therapy, milieu therapy and occupational therapy  2  Behavioral Health checks every 7 minutes  3  Continue frequent safety checks and vitals per unit protocol  4  Continue with SLIM medical management as indicated  5  Continue with current medication regimen  Consider increase in Effexor if depressive symptoms persist (last increase was 11/14/2022)  6  Will review labs in the a m  7 Disposition Planning: Discharge planning and efforts remain ongoing    Behavioral Health Medications: all current active meds have been reviewed and planned medication changes: increase 1500 dose of Clozaril to 50mg    Current Facility-Administered Medications   Medication Dose Route Frequency Provider Last Rate   • acetaminophen  650 mg Oral Q4H PRN Payton Conrad PA-C     • acetaminophen  650 mg Oral Q4H PRN Payton Conrad PA-C     • acetaminophen  975 mg Oral Q6H PRN Payton Conrad PA-C     • albuterol  2 puff Inhalation Q6H PRN Anila Dolan MD     • aluminum-magnesium hydroxide-simethicone  30 mL Oral Q4H PRN Jay Linn MD     • artificial tear   Both Eyes HS PRN Payton Conrad PA-C     • cholecalciferol  1,000 Units Oral Daily Payton Conrad PA-C     • cloZAPine  100 mg Oral Daily JARED Porras     • cloZAPine  100 mg Oral HS JARED Porras     • cloZAPine  50 mg Oral Daily JARED [place]     • Diclofenac Sodium  2 g Topical 4x Daily PRN Anila Dolan MD     • dicyclomine  20 mg Oral 4x Daily PRN Payton Conrad PA-C     • docusate sodium  100 mg Oral Daily Anila Dolan MD     • fenofibrate  145 mg Oral Daily Payton Conrad PA-C     • fluticasone  1 spray Each Nare BID Payton Conrad PA-C     • glycopyrrolate  1 mg Oral BID [facility] Kvng Anne MD     • haloperidol lactate  5 mg Intramuscular Q4H PRN Max 4/day Jay Linn MD     • hydrocortisone   Topical 4x Daily PRN Anila Dolan MD     • hydrOXYzine HCL  50 mg Oral Q6H PRN Max 4/day Brisa Jarquin MD     • loratadine  10 mg Oral Daily Payton Conrad PA-C     • LORazepam  0 5 mg Oral Q8H PRN Brisa Jarquin MD     • LORazepam  1 mg Oral Q8H PRN Brisa Jarquin MD     • methocarbamol  500 mg Oral Q6H PRN Payton Conrad PA-C     • nicotine  1 patch Transdermal Daily Payton Conrad PA-C     • nicotine polacrilex  4 mg Oral Q2H PRN Payton Conrad PA-C     • OLANZapine  2 5 mg Oral TID PRN JARED Porras     • OLANZapine  5 mg Oral Q6H PRN Max 3/day JARED [place]     • pantoprazole  40 mg Oral Early Morning Payton Conrad PA-C     • polyethylene glycol  17 g Oral Daily PRN Payton Conrad PA-C     • traZODone  50 mg Oral HS PRN [place] MD Donna     • venlafaxine  75 mg Oral Daily Eliseo Peterson MD         Risks / Benefits of Treatment:  Risks, benefits, and possible side effects of medications explained to patient and patient verbalizes understanding and agreement for treatment  Counseling / Coordination of Care:  Patient's progress reviewed with nursing staff  Medications, treatment progress and treatment plan reviewed with patient  Supportive counseling provided to the patient  Total floor/unit time spent today 25 minutes  Greater than 50% of total time was spent with the patient and / or family counseling and / or coordination of care  A description of the counseling / coordination of care: medication education, treatment plan, supportive therapy

## 2022-11-15 NOTE — SOCIAL WORK
SW returned call to Jermaine Conklin with ACT Team and informed him Jo-Ann's d/c is planned for Friday, November 18  RN said pt's transportation would be discussed during their tx team meeting tomorrow  RN provided SW with contact info for pt's , Ronal Harris, at [facility]  Phone is either [phone] or [phone]  SW will attempt to get [facility] for group home

## 2022-11-15 NOTE — NURSING NOTE
Patient visible in milieu, withdrawn to self  Pleasant and cooperative in interaction with staff  Minimal interaction with peers  Affect flat/depressed, appears internally preoccupied  Patient initially denied anxiety and depression only to later express anxiety, 7/10, due to thoughts and auditory hallucinations  Patient denies SI/HI  Remains medication compliant and on 7" checks for safety and behaviors

## 2022-11-15 NOTE — NURSING NOTE
During rounding process, patient appears to be sleeping without issue  PRN Trazodone given at HS is effective

## 2022-11-15 NOTE — NURSING NOTE
Patient sleeping soundly  Attempted to wake patient to administer early AM medication, however patient remains asleep at this time

## 2022-11-16 LAB
BASOPHILS # BLD AUTO: 0.04 THOUSANDS/ÂΜL (ref 0–0.1)
BASOPHILS NFR BLD AUTO: 1 % (ref 0–1)
EOSINOPHIL # BLD AUTO: 0.3 THOUSAND/ÂΜL (ref 0–0.61)
EOSINOPHIL NFR BLD AUTO: 4 % (ref 0–6)
ERYTHROCYTE [DISTWIDTH] IN BLOOD BY AUTOMATED COUNT: 14.6 % (ref 11.6–15.1)
HCT VFR BLD AUTO: 44.1 % (ref 34.8–46.1)
HGB BLD-MCNC: 13.6 G/DL (ref 11.5–15.4)
IMM GRANULOCYTES # BLD AUTO: 0.07 THOUSAND/UL (ref 0–0.2)
IMM GRANULOCYTES NFR BLD AUTO: 1 % (ref 0–2)
LYMPHOCYTES # BLD AUTO: 1.79 THOUSANDS/ÂΜL (ref 0.6–4.47)
LYMPHOCYTES NFR BLD AUTO: 21 % (ref 14–44)
MCH RBC QN AUTO: 28.2 PG (ref 26.8–34.3)
MCHC RBC AUTO-ENTMCNC: 30.8 G/DL (ref 31.4–37.4)
MCV RBC AUTO: 91 FL (ref 82–98)
MONOCYTES # BLD AUTO: 0.6 THOUSAND/ÂΜL (ref 0.17–1.22)
MONOCYTES NFR BLD AUTO: 7 % (ref 4–12)
NEUTROPHILS # BLD AUTO: 5.68 THOUSANDS/ÂΜL (ref 1.85–7.62)
NEUTS SEG NFR BLD AUTO: 66 % (ref 43–75)
NRBC BLD AUTO-RTO: 0 /100 WBCS
PLATELET # BLD AUTO: 255 THOUSANDS/UL (ref 149–390)
PMV BLD AUTO: 10.2 FL (ref 8.9–12.7)
RBC # BLD AUTO: 4.83 MILLION/UL (ref 3.81–5.12)
WBC # BLD AUTO: 8.48 THOUSAND/UL (ref 4.31–10.16)

## 2022-11-16 RX ORDER — VENLAFAXINE HYDROCHLORIDE 75 MG/1
75 CAPSULE, EXTENDED RELEASE ORAL DAILY
Qty: 30 CAPSULE | Refills: 0 | Status: SHIPPED | OUTPATIENT
Start: 2022-11-17 | End: 2022-12-17

## 2022-11-16 RX ORDER — CLOZAPINE 100 MG/1
100 TABLET ORAL DAILY
Qty: 30 TABLET | Refills: 0 | Status: SHIPPED | OUTPATIENT
Start: 2022-11-17 | End: 2022-12-17

## 2022-11-16 RX ORDER — LORAZEPAM 1 MG/1
1 TABLET ORAL
Qty: 30 TABLET | Refills: 0 | Status: SHIPPED | OUTPATIENT
Start: 2022-11-16 | End: 2022-12-16

## 2022-11-16 RX ORDER — LORAZEPAM 0.5 MG/1
0.5 TABLET ORAL DAILY
Qty: 30 TABLET | Refills: 0 | Status: SHIPPED | OUTPATIENT
Start: 2022-11-16 | End: 2022-12-16

## 2022-11-16 RX ORDER — CLOZAPINE 50 MG/1
50 TABLET ORAL
Qty: 30 TABLET | Refills: 0 | Status: SHIPPED | OUTPATIENT
Start: 2022-11-16 | End: 2022-12-16

## 2022-11-16 RX ORDER — CLOZAPINE 100 MG/1
100 TABLET ORAL
Qty: 30 TABLET | Refills: 0 | Status: SHIPPED | OUTPATIENT
Start: 2022-11-16 | End: 2022-12-16

## 2022-11-16 RX ADMIN — PANTOPRAZOLE SODIUM 40 MG: 40 TABLET, DELAYED RELEASE ORAL at 05:31

## 2022-11-16 RX ADMIN — GLYCOPYRROLATE 1 MG: 1 TABLET ORAL at 17:21

## 2022-11-16 RX ADMIN — FENOFIBRATE 145 MG: 145 TABLET, COATED ORAL at 08:30

## 2022-11-16 RX ADMIN — LORATADINE 10 MG: 10 TABLET ORAL at 08:30

## 2022-11-16 RX ADMIN — CLOZAPINE 50 MG: 25 TABLET ORAL at 15:27

## 2022-11-16 RX ADMIN — CHOLECALCIFEROL TAB 25 MCG (1000 UNIT) 1000 UNITS: 25 TAB at 08:31

## 2022-11-16 RX ADMIN — DOCUSATE SODIUM 100 MG: 100 CAPSULE, LIQUID FILLED ORAL at 08:30

## 2022-11-16 RX ADMIN — CLOZAPINE 100 MG: 100 TABLET ORAL at 21:17

## 2022-11-16 RX ADMIN — CLOZAPINE 100 MG: 100 TABLET ORAL at 08:30

## 2022-11-16 RX ADMIN — FLUTICASONE PROPIONATE 1 SPRAY: 50 SPRAY, METERED NASAL at 17:21

## 2022-11-16 RX ADMIN — FLUTICASONE PROPIONATE 1 SPRAY: 50 SPRAY, METERED NASAL at 08:30

## 2022-11-16 RX ADMIN — LORAZEPAM 0.5 MG: 0.5 TABLET ORAL at 09:55

## 2022-11-16 RX ADMIN — VENLAFAXINE HYDROCHLORIDE 75 MG: 75 CAPSULE, EXTENDED RELEASE ORAL at 08:30

## 2022-11-16 RX ADMIN — GLYCOPYRROLATE 1 MG: 1 TABLET ORAL at 08:31

## 2022-11-16 RX ADMIN — NICOTINE 1 PATCH: 21 PATCH, EXTENDED RELEASE TRANSDERMAL at 11:01

## 2022-11-16 NOTE — PROGRESS NOTES
This writer was able to obtain pt's lab work and was given to the supervisor to be sent out to the lab

## 2022-11-16 NOTE — PROGRESS NOTES
Progress Note - Behavioral Health   Trini Baum 47 y o  female MRN: [id number]  Unit/Bed#: Conrad Frias 204-01 Encounter: [id number]    Patient was seen today for continuation of care, records reviewed and patient was discussed with the morning case review team   Per staff, the patient slept well overnight, she acute behaviors over the past 24 hours  Sharda Morel was seen today for psychiatric follow-up  On assessment today, Sharda Morel was seen in her room resting this morning  She reports feeling "much better"  She stated "I feel upset I wasted so much time thinking the hypnotizing is real, but that is part of schizophrenia"  She continues to gain insight into her thoughts and delusions  Her thoughts of others hypnotizing are intrusive however she responds to reality testing  She presents slightly brighter today and less internally preoccuppied  She feels as if she will be safe when she returns to her group home  She reports that her boyfriend is a good support for her when she is home  Sharda Morel denies acute suicidal/self-harm ideation/intent/plan  Sharda Morel remains behaviorally appropriate, no agitation or aggression noted on exam or in report  She has no recent verbal outbursts on the unit  Sharda Morel also denies HI/VH  Sharda Morel remains adherent to her current psychotropic medication regimen and denies any side effects from medications, as well as none noted on exam  CBC with diff reviewed and are within normal limits  Vitals:  Vitals:    11/16/22 0744   BP: 113/70   Pulse: 80   Resp: 16   Temp: 97 5 °F (36 4 °C)   SpO2: 90%       Laboratory Results:    I have personally reviewed all pertinent laboratory/tests results    Most Recent Labs:   Lab Results   Component Value Date    WBC 8 48 11/16/2022    RBC 4 83 11/16/2022    HGB 13 6 11/16/2022    HCT 44 1 11/16/2022     11/16/2022    RDW 14 6 11/16/2022    NEUTROABS 5 68 11/16/2022    SODIUM 138 11/09/2022    K 4 0 11/09/2022     11/09/2022    CO2 29 11/09/2022    BUN 7 11/09/2022    CREATININE 0 64 11/09/2022    GLUC 95 11/09/2022    GLUF 129 (H) 04/25/2022    CALCIUM 9 4 11/09/2022    AST 13 11/07/2022    ALT 17 11/07/2022    ALKPHOS 77 11/07/2022    TP 7 4 11/07/2022    ALB 4 3 11/07/2022    TBILI 0 28 11/07/2022    CHOLESTEROL 238 (H) 11/09/2022    HDL 47 (L) 11/09/2022    TRIG 263 (H) 11/09/2022    LDLCALC 138 (H) 11/09/2022    NONHDLC 191 11/09/2022    AMMONIA 28 10/27/2017    [id number] 2 050 11/07/2022    FREET4 0 89 03/24/2016    PREGUR Negative 11/07/2022    PREGSERUM Negative 10/21/2019    HCG <2 00 04/10/2014    RPR Non-Reactive 11/09/2022    HGBA1C 6 1 (H) 06/18/2022     06/18/2022       Psychiatric Review of Systems:  Behavior over the last 24 hours:  improved     Sleep: adequate  Appetite: adequate  Medication side effects: denies and none noted on exam  ROS: no complaints, denies shortness of breath or chest pain and all other systems are negative for acute changes    Mental Status Evaluation:  Appearance:  casually dressed, adequate grooming   Behavior:  cooperative, less bizarre   Speech:  normal rate and volume   Mood:  improved   Affect:  brighter   Thought Process:  goal directed, minimally disorganized   Thought Content:  intrusive thoughts, mild paranoia, no overt paranoia noted on exam   Perceptual Disturbances: auditory hallucinations without commands and of "voices"   Risk Potential: Suicidal ideation - None at present  Homicidal ideation - None at present  Potential for aggression - No   Memory:  recent and remote memory grossly intact   Sensorium  person, place, time/date and situation      Consciousness:  alert and awake   Attention: attention span and concentration are age appropriate   Insight:  improving   Judgment: improving   Gait/Station: normal gait/station   Motor Activity: no abnormal movements   Progress Toward Goals:   Sully Ni is progressing towards goals of inpatient psychiatric treatment by continued medication compliance and is attending therapeutic modalities on the milieu  However, the patient continues to require inpatient psychiatric hospitalization for continued medication management and titration to optimize symptom reduction, improve sleep hygiene, and demonstrate adequate self-care  Assessment/Plan   Principal Problem:    Schizoaffective disorder, bipolar type ([facility])  Active Problems:    LYNN (generalized anxiety disorder)    Degenerative disc disease, lumbar    Post-traumatic stress disorder, chronic    Mild intermittent asthma without complication    Tobacco abuse    Gastroesophageal reflux disease    Emphysema lung ([facility] [address] )      Recommended Treatment: Treatment plan and medication changes discussed and per the attending physician the plan is: 1  Continue with group therapy, milieu therapy and occupational therapy  2  Behavioral Health checks every 7 minutes  3  Continue frequent safety checks and vitals per unit protocol  4  Continue with SLIM medical management as indicated  5  Continue with current medication regimen  6  Will review labs in the a m  7 Disposition Planning: Discharge planning and efforts remain ongoing    Behavioral Health Medications: all current active meds have been reviewed and continue current psychiatric medications    Current Facility-Administered Medications   Medication Dose Route Frequency Provider Last Rate   • acetaminophen  650 mg Oral Q4H PRN Payton Conrad PA-C     • acetaminophen  650 mg Oral Q4H PRN Payton Conrad PA-C     • acetaminophen  975 mg Oral Q6H PRN Payton Conrad PA-C     • albuterol  2 puff Inhalation Q6H PRN Virginia Gonzalez MD     • aluminum-magnesium hydroxide-simethicone  30 mL Oral Q4H PRN Lilliana Thibodeaux MD     • artificial tear   Both Eyes HS PRN Payton Conrad PA-C     • cholecalciferol  1,000 Units Oral Daily Payton Conrad PA-C     • cloZAPine  100 mg Oral Daily JARED [place]     • cloZAPine  100 mg Oral HS Fabiola Portillo JARED     • cloZAPine  50 mg Oral Daily JARED [place]     • Diclofenac Sodium  2 g Topical 4x Daily PRN Nory Steve MD     • dicyclomine  20 mg Oral 4x Daily PRN Payton Conrad PA-C     • docusate sodium  100 mg Oral Daily Nory Steve MD     • fenofibrate  145 mg Oral Daily Payton Conrad PA-C     • fluticasone  1 spray Each Nare BID Payton Conrad PA-C     • glycopyrrolate  1 mg Oral BID [facility] Dimitri Diaz MD     • haloperidol lactate  5 mg Intramuscular Q4H PRN Max 4/day Gisselle Cardozo MD     • hydrocortisone   Topical 4x Daily PRN Nory Steve MD     • hydrOXYzine HCL  50 mg Oral Q6H PRN Max 4/day Cristiano Turk MD     • loratadine  10 mg Oral Daily Payton Conrad PA-C     • LORazepam  0 5 mg Oral Q8H PRN Cristiano Turk MD     • LORazepam  1 mg Oral Q8H PRN Cristiano Turk MD     • methocarbamol  500 mg Oral Q6H PRN Payton Conrad PA-C     • nicotine  1 patch Transdermal Daily Payton Conrad PA-C     • nicotine polacrilex  4 mg Oral Q2H PRN Payton Conrad PA-C     • OLANZapine  2 5 mg Oral TID PRN JARED Castillo     • OLANZapine  5 mg Oral Q6H PRN Max 3/day JARED [place]     • pantoprazole  40 mg Oral Early Morning Payton Conrad PA-C     • polyethylene glycol  17 g Oral Daily PRN Payton Conrad PA-C     • traZODone  50 mg Oral HS PRN [facility] Dimitri Diaz MD     • venlafaxine  75 mg Oral Daily Jose Villegas MD         Risks / Benefits of Treatment:  Risks, benefits, and possible side effects of medications explained to patient and patient verbalizes understanding and agreement for treatment  Counseling / Coordination of Care:  Patient's progress reviewed with nursing staff  Medications, treatment progress and treatment plan reviewed with patient  Supportive counseling provided to the patient  Total floor/unit time spent today 25 minutes   Greater than 50% of total time was spent with the patient and / or family counseling and / or coordination of care  A description of the counseling / coordination of care: medication education, treatment plan, supportive therapy

## 2022-11-16 NOTE — PROGRESS NOTES
11/15/22 2470   Activity/Group Checklist   Group   (Reflecting on the Future Art Therapy)   Attendance Attended   Attendance Duration (min) Greater than 60   Interactions Interacted appropriately   Affect/Mood Appropriate;Calm   Goals Achieved Identified feelings; Identified triggers; Identified relapse prevention strategies; Discussed coping strategies; Identified resources and support systems; Able to engage in interactions; Able to listen to others; Able to manage/cope with feelings; Able to reflect/comment on own behavior

## 2022-11-16 NOTE — PLAN OF CARE
Problem: Ineffective Coping  Goal: Participates in unit activities  Description: Interventions:  - Provide therapeutic environment   - Provide required programming   - Redirect inappropriate behaviors   Outcome: Progressing   Patient out for groups but she remains minimal in her interactions

## 2022-11-16 NOTE — PROGRESS NOTES
Progress Note - Magui Hughes 47 y o  female MRN: [id number]    Unit/Bed#Bin Richards 204-01 Encounter: [id number]        Subjective:   Patient seen and examined at bedside after reviewing the chart and discussing the case with the caring staff  Patient examined at bedside  Patient reports pain in her hemorrhoids  Patient is scheduled for discharge on Friday 11/18/2022  Patient is requesting all his prescriptions  I reviewed and reconciled patient's problem list and medications  Physical Exam   Vitals: Blood pressure 113/70, pulse 80, temperature 97 5 °F (36 4 °C), temperature source Temporal, resp  rate 16, height 5' 1" (1 549 m), weight 68 8 kg (151 lb 9 6 oz), SpO2 90 %, not currently breastfeeding  ,Body mass index is 28 64 kg/m²  Constitutional: Patient in no acute distress  HEENT: PERR, EOMI, MMM, rhinorrhea  Cardiovascular: Normal rate and regular rhythm  Pulmonary/Chest: Effort normal and breath sounds normal    Abdomen:  Non distended  Assessment/Plan:  Magui Hughes is a(n) 47y o  year old female with schizoaffective disorder  MEDICAL CLEARANCE  Patient is medically cleared for discharge  All scripts will be sent out for the patient      1  GERD/abd cramping  Continue Protonix 40 mg daily, Mylanta and Bentyl as needed  2  Tobacco abuse  Patient is on nicotine transdermal patch 21 mg/24 hr, nicotine gum as needed  3  Hyperlipidemia  Continue fenofibrate 145 mg daily  4  Constipation  Patient has been put on Colace daily, MiraLax as needed  5  Asthma  Patient may use albuterol inhaler as needed  6  DDD/OA  Patient may take Tylenol and use Voltaren gel as needed  Discontinue Flexeril and trial Robaxin  7  Nasal congestion  Zyrtec not available in hospital   Patient placed on Claritin 10 mg daily and Flonase nasal spray twice daily  8  Vitamin-D deficiency  Patient started on vitamin D3 1000 units daily  9  Acute hemorrhoid pain    I will put the patient on Anusol jace Echevarria Blend

## 2022-11-16 NOTE — NURSING NOTE
Patient visible in the milieu  She keeps to herself  Pleasant in conversation  No behavioral outbursts this shift  She does report some increased anxiety- PRN Ativan 0 5 mg administered at 0955- effective relief  She does deny depression  Denies SI/HI and hallucinations  She offer no further complaints/ concerns  Plan of care continues  Q7 minute safety checks in progress

## 2022-11-16 NOTE — NUTRITION
22 1337   Biochemical Data,Medical Tests, and Procedures   Biochemical Data/Medical Tests/Procedures Lab values reviewed; Meds reviewed   Labs (Comment)  BMP WNL, CHOL:238, TRI, HDL:47, LDL:138, folate:>20 0, Vit D:25 5   Meds (Comment) Vit D, colace, haldol, zyprexa, protonix, desyrel   Nutrition-Focused Physical Exam   Nutrition-Focused Physical Exam Findings RN skin assessment reviewed  (blister/redness LLQ per documentation)   Nutrition-Focused Physical Exam Findings smoker, LBM    Medical-Related Concerns drug addiction, alcohol dependence, anorexia, dyslipidemia, insomnia, schizophrenia, PTSD, psychosis, suicide attempt   Adequacy of Intake   Nutrition Modality PO   Feeding Route   PO Independent   Current PO Intake   Current Diet Order Regular diet thin liquids  Nutrition Supplements Ensure Clear  (berry TID)   Current Meal Intake 50-75%;%   Intake Supplements 25-50%;50-75%;%   Estimated calorie intake compared to estimated need Nutrient needs met  PES Statement   Problem No nutrition diagnosis   Recommendations/Interventions   Malnutrition/BMI Present No  (does not meet criteria)   Summary Length of stay  Regular diet thin liquids  Berry ensure clear TID  Meal completions previously decreased now have been %  Patient states she lives at a group home  She states she does not follow a diet plan  She reports receiving 3 meals per day provided by the facility  She states her appetite is okay  #; #, 10#(6%) loss in 3 months; #; #, 18#(11%) weight loss in 6 months, significant  Patient states she had gained weight and was trying to lose weight  She states her usual weight was 118#-125#  Skin integrity reviewed  Patient states she is concerned about gaining while at facility  Discussed that since her appetite has improved, ensure clear TID is no longer warranted  She is agreeable to discontinue supplement  Interventions/Recommendations Continue current diet order;Supplement discontinue   Intervention Comments discontinue ensure clear TID   Education Assessment   Education Patient declined nutrition education   Education Notes Brefily discussed lipid profile labs, formal diet education not provided     Nutrition Complexity Risk   Nutrition complexity level Low risk   Follow up date 11/30/22

## 2022-11-16 NOTE — SOCIAL WORK
SETH returned call to Yaquelin Ramos with [facility] ACT Team to discuss pt's dc  Yaquelin Ramos said she will  pt on Friday, 11/18, at 11:00 a m  SETH discussed pt's psychiatric appt  Yaquelin Ramos said pt will have appt with Dr Andra Fontenot either 12/1 or 12/2  Will leave message regarding specific appt day and time if available prior to dc      Niko Somers ACT Team contact #:  [phone]

## 2022-11-16 NOTE — PROGRESS NOTES
11/16/22   Team Meeting   Meeting Type Daily Rounds   Team Members Present   Team Members Present Physician;Nurse;; ;Occupational Therapist   Physician Team Member Dr Alex Chicas MD   Nursing Team Member Fausto Gagnon RN; Paulina Peace RN, [facility]   Care Management Team Member Shelli Gaytan   Social Work Team Member [id number]   OT Team Member Kim Renee   Patient/Family Present   Patient Present No   Patient's Family Present No     Pt exp'd a/h yesterday  Slept last night  Flat, depressed today  No bx's, no outbursts  Expressed feeling sad today  D/C Friday, 11/18   Transport to be discussed with [facility] and ACT Team

## 2022-11-16 NOTE — PROGRESS NOTES
11/16/22 6851   Activity/Group Checklist   Group Community meeting   Attendance Attended   Attendance Duration (min) 31-45  (pt in and out of grp)   Interactions Interacted appropriately   Affect/Mood Appropriate;Blunted/flat   Goals Achieved Identified feelings; Able to listen to others; Able to engage in interactions; Able to self-disclose; Able to recieve feedback

## 2022-11-16 NOTE — NURSING NOTE
Patient was withdrawn to her room this evening  At time of assessment, patient was observed to be resting in bed  She states she feels sad  Denies SI, HI  She endorses mild AH of voices, stating "they are starting to go away " Denies any pain  Patient was medication compliant at   She requested and received PRN Trazodone 50 mg for inability to sleep as well as PRN Ativan for c/o increased anxiety at 2132  Performed a Martines Scale rating with a result of 20  Will CTM  Q7 minute safety checks in progress

## 2022-11-16 NOTE — SOCIAL WORK
SETH obtained DEYA from Orin Chowdary for [facility], pt's group home  SETH placed call to [facility] ([phone]) to discuss pt's dc and transportation  Staff member, Alberta Gutierrez, said he would provide the  with SETH's contact information in order to discuss transportation for pt's dc

## 2022-11-16 NOTE — PLAN OF CARE
Problem: Risk for Self Injury/Neglect  Goal: Refrain from harming self  Description: Interventions:  - Monitor patient closely, per order  - Develop a trusting relationship  - Supervise medication ingestion, monitor effects and side effects   Outcome: Progressing     Problem: Depression  Goal: Verbalize thoughts and feelings  Description: Interventions:  - Assess and re-assess patient's level of risk   - Engage patient in 1:1 interactions, daily, for a minimum of 15 minutes   - Encourage patient to express feelings, fears, frustrations, hopes   Outcome: Progressing     Problem: Anxiety  Goal: Anxiety is at manageable level  Description: Interventions:  - Assess and monitor patient's anxiety level  - Monitor for signs and symptoms (heart palpitations, chest pain, shortness of breath, headaches, nausea, feeling jumpy, restlessness, irritable, apprehensive)  - Collaborate with interdisciplinary team and initiate plan and interventions as ordered    - [place] patient to unit/surroundings  - Explain treatment plan  - Encourage participation in care  - Encourage verbalization of concerns/fears  - Identify coping mechanisms  - Assist in developing anxiety-reducing skills  - Administer/offer alternative therapies  - Limit or eliminate stimulants  Outcome: Progressing

## 2022-11-16 NOTE — PLAN OF CARE
Problem: Ineffective Coping  Goal: Participates in unit activities  Description: Interventions:  - Provide therapeutic environment   - Provide required programming   - Redirect inappropriate behaviors   Outcome: Progressing     Problem: Risk for Self Injury/Neglect  Goal: Refrain from harming self  Description: Interventions:  - Monitor patient closely, per order  - Develop a trusting relationship  - Supervise medication ingestion, monitor effects and side effects   Outcome: Progressing  Goal: Recognize maladaptive responses and adopt new coping mechanisms  Outcome: Progressing     Problem: Depression  Goal: Verbalize thoughts and feelings  Description: Interventions:  - Assess and re-assess patient's level of risk   - Engage patient in 1:1 interactions, daily, for a minimum of 15 minutes   - Encourage patient to express feelings, fears, frustrations, hopes   Outcome: Progressing  Goal: Refrain from isolation  Description: Interventions:  - Develop a trusting relationship   - Encourage socialization   Outcome: Progressing

## 2022-11-16 NOTE — NURSING NOTE
Patient appears to have slept through the overnight hours without issue  No complaints voiced  No acute behaviors observed  She remains in bed sleeping at the present time  Continuous safety rounding in progress

## 2022-11-16 NOTE — PROGRESS NOTES
11/16/22 1330   Activity/Group Checklist   Group   (Thought Processing Art Therapy)   Attendance Attended   Attendance Duration (min) Greater than 60   Interactions Interacted appropriately   Affect/Mood Appropriate;Bright;Calm   Goals Achieved Identified feelings; Discussed coping strategies; Identified relapse prevention strategies; Discussed discharge plans; Increased hopefulness; Identified resources and support systems; Able to listen to others; Able to engage in interactions; Able to reflect/comment on own behavior;Able to manage/cope with feelings; Able to self-disclose

## 2022-11-17 RX ADMIN — LORAZEPAM 1 MG: 1 TABLET ORAL at 17:19

## 2022-11-17 RX ADMIN — FENOFIBRATE 145 MG: 145 TABLET, COATED ORAL at 08:29

## 2022-11-17 RX ADMIN — DOCUSATE SODIUM 100 MG: 100 CAPSULE, LIQUID FILLED ORAL at 08:29

## 2022-11-17 RX ADMIN — CLOZAPINE 50 MG: 25 TABLET ORAL at 15:36

## 2022-11-17 RX ADMIN — GLYCOPYRROLATE 1 MG: 1 TABLET ORAL at 17:17

## 2022-11-17 RX ADMIN — TRAZODONE HYDROCHLORIDE 50 MG: 50 TABLET ORAL at 21:11

## 2022-11-17 RX ADMIN — NICOTINE 1 PATCH: 21 PATCH, EXTENDED RELEASE TRANSDERMAL at 08:30

## 2022-11-17 RX ADMIN — GLYCOPYRROLATE 1 MG: 1 TABLET ORAL at 08:28

## 2022-11-17 RX ADMIN — CHOLECALCIFEROL TAB 25 MCG (1000 UNIT) 1000 UNITS: 25 TAB at 08:28

## 2022-11-17 RX ADMIN — FLUTICASONE PROPIONATE 1 SPRAY: 50 SPRAY, METERED NASAL at 08:29

## 2022-11-17 RX ADMIN — CLOZAPINE 100 MG: 100 TABLET ORAL at 08:28

## 2022-11-17 RX ADMIN — LORATADINE 10 MG: 10 TABLET ORAL at 08:28

## 2022-11-17 RX ADMIN — CLOZAPINE 100 MG: 100 TABLET ORAL at 21:10

## 2022-11-17 RX ADMIN — PANTOPRAZOLE SODIUM 40 MG: 40 TABLET, DELAYED RELEASE ORAL at 06:09

## 2022-11-17 RX ADMIN — FLUTICASONE PROPIONATE 1 SPRAY: 50 SPRAY, METERED NASAL at 17:17

## 2022-11-17 RX ADMIN — VENLAFAXINE HYDROCHLORIDE 75 MG: 75 CAPSULE, EXTENDED RELEASE ORAL at 08:29

## 2022-11-17 NOTE — SOCIAL WORK
SETH placed call to RN from [facility] Team @ [phone] to inquire into pt's upcoming psychiatric and pcp appts   Left voice message requesting callback with appt days and times

## 2022-11-17 NOTE — NURSING NOTE
Presents with apathy,although did smile when DC slated for tomorrow  She does not rate depression or anxiety numerically although per presentation would estimate depression as high,anxiety mild to moderate  shaina is visible on unit,quiet,yet makes needs known  She denies SI,HI,AH( at this time,reports occasional,mumbled,benign)VH  Sylvia Bowers attends groups with active and appropriate interaction  We discussed ways to increase coping skills  Will continue to educate,monitor,and provide safe,therapeutic milieu
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
## 2022-11-17 NOTE — BH TRANSITION RECORD
Contact Information: If you have any questions, concerns, pended studies, tests and/or procedures, or emergencies regarding your inpatient behavioral health visit  Please contact [facility] unit [phone] and ask to speak to a , nurse or physician  A contact is available 24 hours/ 7 days a week at this number  Summary of Procedures Performed During your Stay:  Below is a list of major procedures performed during your hospital stay and a summary of results:  - Cardiac Procedures/Studies: EKG  Pending Studies (From admission, onward)     Start     Ordered    11/16/22 0000  CBC and differential  Every Wednesday      Start Status   11/23/22 0000 Scheduled   11/30/22 0000 Scheduled       11/10/22 1223              Please follow up on the above pending studies with your PCP and/or referring provider

## 2022-11-17 NOTE — NURSING NOTE
Patient visible in the milieu intermittently  She is not social but is pleasant upon approach  She reports feeling ready to discharge tomorrow  She also reports an improvement in her symptoms since being here  She is compliant with her medications  Her appetite is good  She denies Si/HI  She is able to make her needs known  Plan of care continues  Q7 minute safety checks in progress

## 2022-11-17 NOTE — PLAN OF CARE
Problem: Risk for Self Injury/Neglect  Goal: Refrain from harming self  Description: Interventions:  - Monitor patient closely, per order  - Develop a trusting relationship  - Supervise medication ingestion, monitor effects and side effects   Outcome: Progressing     Problem: Depression  Goal: Verbalize thoughts and feelings  Description: Interventions:  - Assess and re-assess patient's level of risk   - Engage patient in 1:1 interactions, daily, for a minimum of 15 minutes   - Encourage patient to express feelings, fears, frustrations, hopes   Outcome: Progressing     Problem: Depression  Goal: Complete daily ADLs, including personal hygiene independently, as able  Description: Interventions:  - Observe, teach, and assist patient with ADLS  -  Monitor and promote a balance of rest/activity, with adequate nutrition and elimination   Outcome: Progressing     Problem: Anxiety  Goal: Anxiety is at manageable level  Description: Interventions:  - Assess and monitor patient's anxiety level  - Monitor for signs and symptoms (heart palpitations, chest pain, shortness of breath, headaches, nausea, feeling jumpy, restlessness, irritable, apprehensive)  - Collaborate with interdisciplinary team and initiate plan and interventions as ordered  - [place] patient to unit/surroundings  - Explain treatment plan  - Encourage participation in care  - Encourage verbalization of concerns/fears  - Identify coping mechanisms  - Assist in developing anxiety-reducing skills  - Administer/offer alternative therapies  - Limit or eliminate stimulants  Outcome: Progressing     Problem: Nutrition/Hydration-ADULT  Goal: Nutrient/Hydration intake appropriate for improving, restoring or maintaining nutritional needs  Description: Monitor and assess patient's nutrition/hydration status for malnutrition  Collaborate with interdisciplinary team and initiate plan and interventions as ordered    Monitor patient's weight and dietary intake as ordered or per policy  Utilize nutrition screening tool and intervene as necessary  Determine patient's food preferences and provide high-protein, high-caloric foods as appropriate       INTERVENTIONS:  - Monitor oral intake, urinary output, labs, and treatment plans  - Assess nutrition and hydration status and recommend course of action  - Evaluate amount of meals eaten  - Assist patient with eating if necessary   - Allow adequate time for meals  - Recommend/ encourage appropriate diets, oral nutritional supplements, and vitamin/mineral supplements  - Order, calculate, and assess calorie counts as needed  - Recommend, monitor, and adjust tube feedings and TPN/PPN based on assessed needs  - Assess need for intravenous fluids  - Provide specific nutrition/hydration education as appropriate  - Include patient/family/caregiver in decisions related to nutrition  Outcome: Progressing

## 2022-11-17 NOTE — PROGRESS NOTES
11/17/22   Team Meeting   Meeting Type Daily Rounds   Team Members Present   Team Members Present Physician;Nurse;; ;Occupational Therapist   Physician Team Member Dr Kishore Tee MD   Nursing Team Member Charlene Villela RN; Holly Jama RN, [facility]   Care Management Team Member Anupam Mueller [facility]   Social Work Team Member Charla HOPSON   OT Team Member Isamar Newton   Patient/Family Present   Patient Present No   Patient's Family Present No     Pt withdrawn to room  Pleasant and cooperative  Pt positive about D/C tomorrow 11:00 a m  [facility] ACT Team member, Niya Casillas, picking up pt

## 2022-11-17 NOTE — NURSING NOTE
C/O anxiety related to psychotic breakdown described as "tasting things on my breath"headache score of 29  ;offered antipsychotic of which she refuses due to her stating she had just taken clozapine  Offered and accepted 1 mg po ativan with education on expected therapeutics and side affects to report  Will monitor

## 2022-11-17 NOTE — PROGRESS NOTES
Progress Note - Behavioral Health   Alyssa Quintero 47 y o  female MRN: [id number]  Unit/Bed#: [facility] 204-01 Encounter: [id number]    Patient was seen today for continuation of care, records reviewed and patient was discussed with the morning case review team   Per staff, patient is visible in the milieu, she is pleasant and cooperative but keeps to herself  She is medications compliant and slept well overnight  There have been no acute events over the past 24 hours  Gwen Grimaldo was seen today for psychiatric follow-up  On assessment today, Gwen Grimaldo was seen resting in her room  Nica Prado states "I had a good day yesterday"  She reports she is anxious for discharge tomorrow  She continues to have some mild auditory hallucinations that are much improved  She states she cannot not determine what the voice says but she hears "something"  They are not command hallucinations  She feels she is improving every day  She denies depression but states "Im not depressed but not overly happy either"  She denies acute suicidal/self-harm ideation/intent/plan upon direct inquiry at this time  She states she feels she will be safe when she returns to her group home tomorrow  Gwen Grimaldo remains behaviorally appropriate, no agitation or aggression noted on exam or in report  Gwen Grimaldo  denies HI/VH, and does not appear overtly manic  No overt delusions or paranoia are verbalized  Gwen Grimaldo remains adherent to her current psychotropic medication regimen and denies any side effects from medications, as well as none noted on exam     Vitals:  Vitals:    11/17/22 0748   BP: 108/74   Pulse: 94   Resp: 18   Temp: (!) 96 8 °F (36 °C)   SpO2: 94%       Laboratory Results:    I have personally reviewed all pertinent laboratory/tests results    Most Recent Labs:   Lab Results   Component Value Date    WBC 8 48 11/16/2022    RBC 4 83 11/16/2022    HGB 13 6 11/16/2022    HCT 44 1 11/16/2022     11/16/2022    RDW 14 6 11/16/2022    NEUTROABS 5 68 11/16/2022    SODIUM 138 11/09/2022    K 4 0 11/09/2022     11/09/2022    CO2 29 11/09/2022    BUN 7 11/09/2022    CREATININE 0 64 11/09/2022    GLUC 95 11/09/2022    GLUF 129 (H) 04/25/2022    CALCIUM 9 4 11/09/2022    AST 13 11/07/2022    ALT 17 11/07/2022    ALKPHOS 77 11/07/2022    TP 7 4 11/07/2022    ALB 4 3 11/07/2022    TBILI 0 28 11/07/2022    CHOLESTEROL 238 (H) 11/09/2022    HDL 47 (L) 11/09/2022    TRIG 263 (H) 11/09/2022    LDLCALC 138 (H) 11/09/2022    NONHDLC 191 11/09/2022    AMMONIA 28 10/27/2017    UOI0PEILPCJW 2 050 11/07/2022    FREET4 0 89 03/24/2016    PREGUR Negative 11/07/2022    PREGSERUM Negative 10/21/2019    HCG <2 00 04/10/2014    RPR Non-Reactive 11/09/2022    HGBA1C 6 1 (H) 06/18/2022     06/18/2022       Psychiatric Review of Systems:  Behavior over the last 24 hours:  improved     Sleep:   Appetite:   Medication side effects:   ROS: no complaints, denies shortness of breath or chest pain and all other systems are negative for acute changes    Mental Status Evaluation:  Appearance:  dressed appropriately, adequate grooming   Behavior:  cooperative, calm   Speech:  normal rate and volume   Mood:  improved   Affect:  brighter   Thought Process:  goal directed, linear   Thought Content:  intrusive thoughts, no overt paranoia noted on exam   Perceptual Disturbances: auditory hallucinations of "voices" still present, but less frequent, no visual hallucinations   Risk Potential: Suicidal ideation - None at present  Homicidal ideation - None at present  Potential for aggression - No   Memory:  recent and remote memory grossly intact   Sensorium  person, place, time/date and situation      Consciousness:  alert and awake   Attention: attention span and concentration are age appropriate   Insight:  improving   Judgment: improving   Gait/Station: normal gait/station   Motor Activity: no abnormal movements   Progress Toward Goals:   Ariellakendrick Hudsona is progressing towards goals of inpatient psychiatric treatment by continued medication compliance and is attending therapeutic modalities on the milieu  However, the patient continues to require inpatient psychiatric hospitalization for continued medication management and titration to optimize symptom reduction, improve sleep hygiene, and demonstrate adequate self-care  Assessment/Plan   Principal Problem:    Schizoaffective disorder, bipolar type (HCC)  Active Problems:    LYNN (generalized anxiety disorder)    Degenerative disc disease, lumbar    Post-traumatic stress disorder, chronic    Mild intermittent asthma without complication    Tobacco abuse    Gastroesophageal reflux disease    Emphysema lung ([facility] [address] )      Recommended Treatment: Treatment plan and medication changes discussed and per the attending physician the plan is: 1  Continue with group therapy, milieu therapy and occupational therapy  2  Behavioral Health checks every 7 minutes  3  Continue frequent safety checks and vitals per unit protocol  4  Continue with SLIM medical management as indicated  5  Continue with current medication regimen  6  Will review labs in the a m  7 Disposition Planning: Discharge planning and efforts remain ongoing    Behavioral Health Medications: all current active meds have been reviewed and continue current psychiatric medications    Current Facility-Administered Medications   Medication Dose Route Frequency Provider Last Rate   • acetaminophen  650 mg Oral Q4H PRN Payton Conrad PA-C     • acetaminophen  650 mg Oral Q4H PRN Payton Conrad PA-C     • acetaminophen  975 mg Oral Q6H PRN Payton Conrad PA-C     • albuterol  2 puff Inhalation Q6H PRN Brit Grover MD     • aluminum-magnesium hydroxide-simethicone  30 mL Oral Q4H PRN Alexandrea Dawson MD     • artificial tear   Both Eyes HS PRN Payton Conrad PA-C     • cholecalciferol  1,000 Units Oral Daily Payton Conrad PA-C     • cloZAPine  100 mg Oral Daily Fabiola Richards RENATANP     • cloZAPine  100 mg Oral HS JARED Guzman     • cloZAPine  50 mg Oral Daily JARED Guzman     • Diclofenac Sodium  2 g Topical 4x Daily PRN Rickey Reeves MD     • dicyclomine  20 mg Oral 4x Daily PRN Payton Conrad PA-C     • docusate sodium  100 mg Oral Daily Rickey Reeves MD     • fenofibrate  145 mg Oral Daily Payton Conrad PA-C     • fluticasone  1 spray Each Nare BID Payton Conrad PA-C     • glycopyrrolate  1 mg Oral BID [facility] Alva Tay MD     • haloperidol lactate  5 mg Intramuscular Q4H PRN Max 4/day Soniya Cruz MD     • hydrocortisone   Topical 4x Daily PRN Rickey Reeves MD     • hydrOXYzine HCL  50 mg Oral Q6H PRN Max 4/day Juan M Ramon MD     • loratadine  10 mg Oral Daily Payton Conrad PA-C     • LORazepam  0 5 mg Oral Q8H PRN Juan M Ramon MD     • LORazepam  1 mg Oral Q8H PRN Juan M Ramon MD     • methocarbamol  500 mg Oral Q6H PRN Payton Conrad PA-C     • nicotine  1 patch Transdermal Daily Payton Conrad PA-C     • nicotine polacrilex  4 mg Oral Q2H PRN Payton Conrad PA-C     • OLANZapine  2 5 mg Oral TID PRN JARED Guzman     • OLANZapine  5 mg Oral Q6H PRN Max 3/day JARED Richmond     • pantoprazole  40 mg Oral Early Morning Payton Conrad PA-C     • polyethylene glycol  17 g Oral Daily PRN Payton Conrad PA-C     • traZODone  50 mg Oral HS PRN [facility] Alva Tay MD     • venlafaxine  75 mg Oral Daily Dylan De La O MD         Risks / Benefits of Treatment:  Risks, benefits, and possible side effects of medications explained to patient and patient verbalizes understanding and agreement for treatment  Counseling / Coordination of Care:  Patient's progress reviewed with nursing staff  Medications, treatment progress and treatment plan reviewed with patient  Supportive counseling provided to the patient  Total floor/unit time spent today 25 minutes  Greater than 50% of total time was spent with the patient and / or family counseling and / or coordination of care  A description of the counseling / coordination of care: medication education, treatment plan, supportive therapy

## 2022-11-17 NOTE — SOCIAL WORK
SW placed call to [facility] Team to provide her with instruction for picking up pt  Left message with Arinick Baca to go to reception desk in [facility] and state she is here for pt in OA  Provided OA nurses station number if  stepped away from desk

## 2022-11-17 NOTE — PROGRESS NOTES
11/17/22 1330   Activity/Group Checklist   Group Pet therapy   Attendance Attended   Attendance Duration (min) 31-45   Interactions Interacted appropriately   Affect/Mood Appropriate   Goals Achieved Identified feelings; Discussed coping strategies; Able to listen to others; Able to engage in interactions; Able to manage/cope with feelings; Able to self-disclose; Able to recieve feedback

## 2022-11-17 NOTE — PROGRESS NOTES
Progress Note - Alyssa Quintero 47 y o  female MRN: [id number]    Unit/Bed#[facility] 204-01 Encounter: [id number]        Subjective:   Patient seen and examined at bedside after reviewing the chart and discussing the case with the caring staff  Patient examined at bedside  Patient has no acute complaints  Patient is scheduled for discharge tomorrow, Friday 11/18/2022  Physical Exam   Vitals: Blood pressure 108/74, pulse 94, temperature (!) 96 8 °F (36 °C), temperature source Temporal, resp  rate 18, height 5' 1" (1 549 m), weight 68 8 kg (151 lb 9 6 oz), SpO2 94 %, not currently breastfeeding  ,Body mass index is 28 64 kg/m²  Constitutional: Patient in no acute distress  HEENT: PERR, EOMI, MMM, rhinorrhea  Cardiovascular: Normal rate and regular rhythm  Pulmonary/Chest: Effort normal and breath sounds normal    Abdomen:  Non distended  Assessment/Plan:  Alyssa Quintero is a(n) 47y o  year old female with schizoaffective disorder  MEDICAL CLEARANCE  Patient is medically cleared for discharge  All scripts will be sent out for the patient      1  GERD/abd cramping  Continue Protonix 40 mg daily, Mylanta and Bentyl as needed  2  Tobacco abuse  Patient is on nicotine transdermal patch 21 mg/24 hr, nicotine gum as needed  3  Hyperlipidemia  Continue fenofibrate 145 mg daily  4  Constipation  Patient has been put on Colace daily, MiraLax as needed  5  Asthma  Patient may use albuterol inhaler as needed  6  DDD/OA  Patient may take Tylenol and use Voltaren gel as needed  Discontinue Flexeril and trial Robaxin  7  Nasal congestion  Zyrtec not available in hospital   Patient placed on Claritin 10 mg daily and Flonase nasal spray twice daily  8  Vitamin-D deficiency  Patient started on vitamin D3 1000 units daily  9  Hemorrhoids  Patient may use Anusol cream as needed  The patient was discussed with Dr aMury Escobar and he is in agreement with the above note

## 2022-11-17 NOTE — NURSING NOTE
Patient was observed resting in bed during assessment  Withdrawn to room for the majority of the evening  Denies anxiety depression SI/HI and hallucinations  Able to make needs known  No complaints of pain or discomfort voiced   Will continue to monitor via q 7 minute safety checks

## 2022-11-17 NOTE — DISCHARGE INSTR - OTHER ORDERS
You are being discharged to your [facility] group home at [facility]  [address]; phone [phone]  Triggers you have identified during your hospitalization that led to your admission were medications not working and having distressed mood  Coping skills you have identified during your hospitalization include reading, meditation, listening to music, coloring, watching spiritual videos,  and prayer  If you are unable to deal with your distressed mood alone please contact [facility] Team at [phone]  If that is not effective and you continue to have distressed mood, or feel you are in crisis, please contact [facility] Crisis at [phone], dial [phone] or go to the nearest emergency center  [facility] Crisis Hotline: [place] Suicide Prevention Lifeline:  5-449.734.9841  *Alcohol Anonymous: 609.929.1487  *[place] Drug & Alcohol Commission: (495) 276-5313  [address] [facility]  on [address] [place] [facility] HELPLINE: 436.587.2090/Website: www mariana org  *Substance Abuse and [address] [facility]) [employer], which is a confidential, free, 24-hour-a-day, 365-day-a-year, information service for individuals and family members facing mental health and/or substance use disorders  This service provides referrals to local treatment facilities, support groups, and community-based organizations  Callers can also order free publications and other information  Call 0-143.690.7430/Website: www [facility] gov  *[facility] 2-1-1: This is a toll free, confidential, 24-hour-a-day service which connects you to a community  in your area who can help you find services and resources that are available to you locally and provide critical services that can improve and save lives    Call: 211  /Website: https://liborio-ambrocio melgoza/    Follow up with providers through [facility], or tiffanie [facility], will be calling you after your discharge, on the phone number that you provided  They will be available as an additional support, if needed  If you wish to speak with one of them, you may contact Deya Hirsch at [phone] or Robert Painting at [phone]

## 2022-11-17 NOTE — PROGRESS NOTES
11/17/22 0704   Activity/Group Checklist   Group Community meeting   Attendance Attended   Attendance Duration (min) 16-30   Interactions Interacted appropriately   Affect/Mood Appropriate   Goals Achieved Identified feelings; Able to listen to others; Able to engage in interactions; Able to self-disclose; Able to recieve feedback

## 2022-11-17 NOTE — NURSING NOTE
Assumed care of this patient from prior shift nurse at [phone]  Patient is currently in bed, appears to be sleeping  No acute behaviors observed  Will CTM  Q7 minute safety checks in progress

## 2022-11-18 VITALS
HEART RATE: 86 BPM | WEIGHT: 151.6 LBS | OXYGEN SATURATION: 92 % | DIASTOLIC BLOOD PRESSURE: 59 MMHG | HEIGHT: 61 IN | BODY MASS INDEX: 28.62 KG/M2 | SYSTOLIC BLOOD PRESSURE: 104 MMHG | TEMPERATURE: 98.3 F | RESPIRATION RATE: 16 BRPM

## 2022-11-18 RX ADMIN — DOCUSATE SODIUM 100 MG: 100 CAPSULE, LIQUID FILLED ORAL at 08:33

## 2022-11-18 RX ADMIN — LORATADINE 10 MG: 10 TABLET ORAL at 08:33

## 2022-11-18 RX ADMIN — GLYCOPYRROLATE 1 MG: 1 TABLET ORAL at 08:33

## 2022-11-18 RX ADMIN — FENOFIBRATE 145 MG: 145 TABLET, COATED ORAL at 08:33

## 2022-11-18 RX ADMIN — PANTOPRAZOLE SODIUM 40 MG: 40 TABLET, DELAYED RELEASE ORAL at 06:21

## 2022-11-18 RX ADMIN — CLOZAPINE 100 MG: 100 TABLET ORAL at 08:33

## 2022-11-18 RX ADMIN — VENLAFAXINE HYDROCHLORIDE 75 MG: 75 CAPSULE, EXTENDED RELEASE ORAL at 08:33

## 2022-11-18 RX ADMIN — CHOLECALCIFEROL TAB 25 MCG (1000 UNIT) 1000 UNITS: 25 TAB at 08:33

## 2022-11-18 NOTE — PROGRESS NOTES
Pt discharged with following belongings:    Pink slip on shoes  Pink pants  White sports bra  Tan sweater  Black slippers  Orange & black pants  Grey pants  Underwear x2  Green short sleeve shirt  Grey short sleeve shirt  Grey & pink long sleeve shirt  Grey tank  Pink tank  Eyeglasses x2  Toothpaste  Conditioner  Black & white makeup bag  White scarf  Green & white bag with misc  Items  Yellow & black bag with misc  Clothing  Green lighter  Cell phone w/   Pink/orange bracelet  White metal necklace w/ purple stone pendant  Black wallet w/ valuables  Inhaler    Pt signed and agreed to belongings

## 2022-11-18 NOTE — PROGRESS NOTES
11/18/22   Team Meeting   Meeting Type Daily Rounds   Team Members Present   Team Members Present Physician;Nurse;;; Occupational Therapist   Physician Team Member Dr Marco Silvestre MD; [address]   Nursing Team Member Sneha Grullon RN; Luis Vilchis RN, [facility]   Care Management Team [facility]   Social Work Team Member [address] [facility]   OT Team Member Dawson NAVARRETES   Patient/Family Present   Patient Present No   Patient's Family Present No     Pt slept last night  Up early this am  Smiling, pleasant, cooperative  D/C today 11:00 a m

## 2022-11-18 NOTE — NURSING NOTE
Patient visible in the milieu during meal times  She is calm and cooperative  Compliant with scheduled medications  Appetite is good  Pt ready for upcoming discharge this morning  She currently denies anxiety and depression  Denies Si/HI and hallucinations  She offers no complaints/ concerns at this time  Plan of care continues  Q7 minute safety checks in progress

## 2022-11-18 NOTE — PROGRESS NOTES
Progress Note - Aleshia Galindo 47 y o  female MRN: [id number]    Unit/Bed#[facility] [place] 204-01 Encounter: [id number]        Subjective:   Patient seen and examined at bedside after reviewing the chart and discussing the case with the caring staff  Patient examined at bedside  Patient has no acute complaints  Patient is scheduled for discharge today, Friday 11/18/2022  Physical Exam   Vitals: Blood pressure 104/59, pulse 86, temperature 98 3 °F (36 8 °C), temperature source Temporal, resp  rate 16, height 5' 1" (1 549 m), weight 68 8 kg (151 lb 9 6 oz), SpO2 92 %, not currently breastfeeding  ,Body mass index is 28 64 kg/m²  Constitutional: Patient in no acute distress  HEENT: PERR, EOMI, MMM  Cardiovascular: Normal rate and regular rhythm  Pulmonary/Chest: Effort normal and breath sounds normal    Abdomen:  Non distended  Assessment/Plan:  Aleshia Galindo is a(n) 47y o  year old female with schizoaffective disorder  MEDICAL CLEARANCE  Patient is medically cleared for discharge  All scripts will be sent out for the patient      1  GERD/abd cramping  Continue Protonix 40 mg daily, Mylanta and Bentyl as needed  2  Tobacco abuse  Patient is on nicotine transdermal patch 21 mg/24 hr, nicotine gum as needed  3  Hyperlipidemia  Continue fenofibrate 145 mg daily  4  Constipation  Patient has been put on Colace daily, MiraLax as needed  5  Asthma  Patient may use albuterol inhaler as needed  6  DDD/OA  Patient may take Tylenol and use Voltaren gel as needed  Discontinue Flexeril and trial Robaxin  7  Nasal congestion  Zyrtec not available in hospital   Patient placed on Claritin 10 mg daily and Flonase nasal spray twice daily  8  Vitamin-D deficiency  Patient started on vitamin D3 1000 units daily  9  Hemorrhoids  Patient may use Anusol cream as needed  The patient was discussed with Dr Isabela Kay and he is in agreement with the above note

## 2022-11-18 NOTE — PLAN OF CARE
Problem: Risk for Self Injury/Neglect  Goal: Treatment Goal: Remain safe during length of stay, learn and adopt new coping skills, and be free of self-injurious ideation, impulses and acts at the time of discharge  Outcome: Progressing  Goal: Refrain from harming self  Description: Interventions:  - Monitor patient closely, per order  - Develop a trusting relationship  - Supervise medication ingestion, monitor effects and side effects   Outcome: Progressing  Goal: Recognize maladaptive responses and adopt new coping mechanisms  Outcome: Progressing     Problem: Risk for Self Injury/Neglect  Goal: Refrain from harming self  Description: Interventions:  - Monitor patient closely, per order  - Develop a trusting relationship  - Supervise medication ingestion, monitor effects and side effects   Outcome: Progressing     Problem: DISCHARGE PLANNING - CARE MANAGEMENT  Goal: Discharge to post-acute care or home with appropriate resources  Description: INTERVENTIONS:  - Conduct assessment to determine patient/family and health care team treatment goals, and need for post-acute services based on payer coverage, community resources, and patient preferences, and barriers to discharge  - Address psychosocial, clinical, and financial barriers to discharge as identified in assessment in conjunction with the patient/family and health care team  - Arrange appropriate level of post-acute services according to patient’s   needs and preference and payer coverage in collaboration with the physician and health care team  - Communicate with and update the patient/family, physician, and health care team regarding progress on the discharge plan  - Arrange appropriate transportation to post-acute venues  Outcome: Progressing  see note

## 2022-11-18 NOTE — NURSING NOTE
Patient ambulated from unit w/ BHT  AVS reviewed w/ pt and she verbalized understanding  All belongings sent w/ pt

## 2022-11-18 NOTE — NURSING NOTE
Assumed care of this patient from prior shift nurse at [phone]  Patient is currently in bed, appears to be sleeping  No acute behaviors observed  Continuous safety rounding in progress

## 2022-11-18 NOTE — PROGRESS NOTES
11/18/22 0750   Activity/Group Checklist   Group Community meeting   Attendance Attended   Attendance Duration (min) 16-30   Interactions Interacted appropriately   Affect/Mood Appropriate   Goals Achieved Identified feelings; Able to listen to others; Able to engage in interactions; Able to self-disclose; Able to recieve feedback

## 2022-11-29 ENCOUNTER — HOSPITAL ENCOUNTER (EMERGENCY)
Facility: HOSPITAL | Age: 52
End: 2022-11-30
Attending: EMERGENCY MEDICINE

## 2022-11-29 DIAGNOSIS — J43.9 EMPHYSEMA LUNG (HCC): ICD-10-CM

## 2022-11-29 DIAGNOSIS — F17.210 DEPENDENCE ON NICOTINE FROM CIGARETTES: ICD-10-CM

## 2022-11-29 DIAGNOSIS — N39.46 MIXED STRESS AND URGE URINARY INCONTINENCE: ICD-10-CM

## 2022-11-29 DIAGNOSIS — R45.851 SUICIDAL IDEATION: Primary | ICD-10-CM

## 2022-11-29 DIAGNOSIS — K21.9 GASTROESOPHAGEAL REFLUX DISEASE WITHOUT ESOPHAGITIS: ICD-10-CM

## 2022-11-29 DIAGNOSIS — M51.36 DEGENERATIVE DISC DISEASE, LUMBAR: ICD-10-CM

## 2022-11-29 DIAGNOSIS — K59.01 SLOW TRANSIT CONSTIPATION: ICD-10-CM

## 2022-11-29 DIAGNOSIS — F10.21 HISTORY OF ALCOHOL DEPENDENCE (HCC): ICD-10-CM

## 2022-11-29 DIAGNOSIS — E55.9 VITAMIN D DEFICIENCY: ICD-10-CM

## 2022-11-29 LAB
AMPHETAMINES SERPL QL SCN: NEGATIVE
BARBITURATES UR QL: NEGATIVE
BENZODIAZ UR QL: NEGATIVE
COCAINE UR QL: NEGATIVE
ETHANOL EXG-MCNC: 0 MG/DL
EXT PREGNANCY TEST URINE: NEGATIVE
FLUAV RNA RESP QL NAA+PROBE: NEGATIVE
FLUBV RNA RESP QL NAA+PROBE: NEGATIVE
METHADONE UR QL: NEGATIVE
OPIATES UR QL SCN: NEGATIVE
OXYCODONE+OXYMORPHONE UR QL SCN: NEGATIVE
PCP UR QL: NEGATIVE
RSV RNA RESP QL NAA+PROBE: NEGATIVE
SARS-COV-2 RNA RESP QL NAA+PROBE: NEGATIVE
THC UR QL: NEGATIVE

## 2022-11-29 RX ORDER — LORAZEPAM 1 MG/1
1 TABLET ORAL ONCE
Status: COMPLETED | OUTPATIENT
Start: 2022-11-29 | End: 2022-11-29

## 2022-11-29 RX ORDER — LORAZEPAM 2 MG/ML
2 INJECTION INTRAMUSCULAR ONCE
Status: COMPLETED | OUTPATIENT
Start: 2022-11-29 | End: 2022-11-29

## 2022-11-29 RX ADMIN — LORAZEPAM 1 MG: 1 TABLET ORAL at 17:50

## 2022-11-29 RX ADMIN — LORAZEPAM 2 MG: 2 INJECTION INTRAMUSCULAR; INTRAVENOUS at 19:14
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
## 2022-11-30 ENCOUNTER — HOSPITAL ENCOUNTER (INPATIENT)
Facility: HOSPITAL | Age: 52
LOS: 14 days | End: 2022-12-14
Attending: HOSPITALIST | Admitting: PSYCHIATRY & NEUROLOGY

## 2022-11-30 VITALS
BODY MASS INDEX: 30.21 KG/M2 | TEMPERATURE: 98 F | DIASTOLIC BLOOD PRESSURE: 76 MMHG | RESPIRATION RATE: 18 BRPM | WEIGHT: 160 LBS | HEIGHT: 61 IN | HEART RATE: 93 BPM | SYSTOLIC BLOOD PRESSURE: 118 MMHG | OXYGEN SATURATION: 95 %

## 2022-11-30 DIAGNOSIS — R41.3 MEMORY DIFFICULTY: Primary | ICD-10-CM

## 2022-11-30 DIAGNOSIS — K59.01 SLOW TRANSIT CONSTIPATION: ICD-10-CM

## 2022-11-30 DIAGNOSIS — Z00.8 MEDICAL CLEARANCE FOR PSYCHIATRIC ADMISSION: ICD-10-CM

## 2022-11-30 DIAGNOSIS — N39.46 MIXED STRESS AND URGE URINARY INCONTINENCE: ICD-10-CM

## 2022-11-30 DIAGNOSIS — M54.50 LUMBAGO: ICD-10-CM

## 2022-11-30 DIAGNOSIS — E55.9 VITAMIN D DEFICIENCY: ICD-10-CM

## 2022-11-30 DIAGNOSIS — F41.1 GAD (GENERALIZED ANXIETY DISORDER): ICD-10-CM

## 2022-11-30 DIAGNOSIS — M51.36 DEGENERATIVE DISC DISEASE, LUMBAR: ICD-10-CM

## 2022-11-30 DIAGNOSIS — K21.9 GASTROESOPHAGEAL REFLUX DISEASE WITHOUT ESOPHAGITIS: ICD-10-CM

## 2022-11-30 DIAGNOSIS — F10.21 HISTORY OF ALCOHOL DEPENDENCE (HCC): ICD-10-CM

## 2022-11-30 DIAGNOSIS — J30.9 ALLERGIC RHINITIS: ICD-10-CM

## 2022-11-30 DIAGNOSIS — F25.0 SCHIZOAFFECTIVE DISORDER, BIPOLAR TYPE (HCC): ICD-10-CM

## 2022-11-30 DIAGNOSIS — J43.9 EMPHYSEMA LUNG (HCC): ICD-10-CM

## 2022-11-30 DIAGNOSIS — F43.12 POST-TRAUMATIC STRESS DISORDER, CHRONIC: ICD-10-CM

## 2022-11-30 DIAGNOSIS — F17.210 DEPENDENCE ON NICOTINE FROM CIGARETTES: ICD-10-CM

## 2022-11-30 DIAGNOSIS — K59.00 CONSTIPATION: ICD-10-CM

## 2022-11-30 RX ORDER — BENZTROPINE MESYLATE 1 MG/ML
1 INJECTION INTRAMUSCULAR; INTRAVENOUS
Status: CANCELLED | OUTPATIENT
Start: 2022-11-30

## 2022-11-30 RX ORDER — HYDROXYZINE HYDROCHLORIDE 25 MG/1
100 TABLET, FILM COATED ORAL
Status: CANCELLED | OUTPATIENT
Start: 2022-11-30

## 2022-11-30 RX ORDER — MELATONIN
1000 DAILY
Status: DISCONTINUED | OUTPATIENT
Start: 2022-11-30 | End: 2022-11-30 | Stop reason: HOSPADM

## 2022-11-30 RX ORDER — MELATONIN
1000 DAILY
Status: CANCELLED | OUTPATIENT
Start: 2022-12-01

## 2022-11-30 RX ORDER — DIPHENHYDRAMINE HYDROCHLORIDE 50 MG/ML
50 INJECTION INTRAMUSCULAR; INTRAVENOUS EVERY 6 HOURS PRN
Status: CANCELLED | OUTPATIENT
Start: 2022-11-30

## 2022-11-30 RX ORDER — PANTOPRAZOLE SODIUM 40 MG/1
40 TABLET, DELAYED RELEASE ORAL
Status: CANCELLED | OUTPATIENT
Start: 2022-12-01

## 2022-11-30 RX ORDER — CLOZAPINE 25 MG/1
50 TABLET ORAL DAILY
Status: DISCONTINUED | OUTPATIENT
Start: 2022-11-30 | End: 2022-11-30 | Stop reason: HOSPADM

## 2022-11-30 RX ORDER — BENZTROPINE MESYLATE 1 MG/1
1 TABLET ORAL EVERY 6 HOURS PRN
Status: DISCONTINUED | OUTPATIENT
Start: 2022-11-30 | End: 2022-12-14 | Stop reason: HOSPADM

## 2022-11-30 RX ORDER — ALBUTEROL SULFATE 90 UG/1
2 AEROSOL, METERED RESPIRATORY (INHALATION) EVERY 4 HOURS PRN
Status: DISCONTINUED | OUTPATIENT
Start: 2022-11-30 | End: 2022-11-30 | Stop reason: HOSPADM

## 2022-11-30 RX ORDER — LORAZEPAM 2 MG/ML
2 INJECTION INTRAMUSCULAR EVERY 6 HOURS PRN
Status: DISCONTINUED | OUTPATIENT
Start: 2022-11-30 | End: 2022-12-07

## 2022-11-30 RX ORDER — LORAZEPAM 2 MG/ML
1 INJECTION INTRAMUSCULAR
Status: DISCONTINUED | OUTPATIENT
Start: 2022-11-30 | End: 2022-12-02

## 2022-11-30 RX ORDER — HALOPERIDOL 5 MG/1
5 TABLET ORAL
Status: CANCELLED | OUTPATIENT
Start: 2022-11-30

## 2022-11-30 RX ORDER — ALBUTEROL SULFATE 90 UG/1
2 AEROSOL, METERED RESPIRATORY (INHALATION) EVERY 4 HOURS PRN
Status: DISCONTINUED | OUTPATIENT
Start: 2022-11-30 | End: 2022-12-14 | Stop reason: HOSPADM

## 2022-11-30 RX ORDER — VENLAFAXINE HYDROCHLORIDE 75 MG/1
75 CAPSULE, EXTENDED RELEASE ORAL DAILY
Status: CANCELLED | OUTPATIENT
Start: 2022-12-01

## 2022-11-30 RX ORDER — TRAZODONE HYDROCHLORIDE 100 MG/1
100 TABLET ORAL
Status: DISCONTINUED | OUTPATIENT
Start: 2022-11-30 | End: 2022-12-14 | Stop reason: HOSPADM

## 2022-11-30 RX ORDER — BENZTROPINE MESYLATE 1 MG/ML
0.5 INJECTION INTRAMUSCULAR; INTRAVENOUS
Status: CANCELLED | OUTPATIENT
Start: 2022-11-30

## 2022-11-30 RX ORDER — CLOZAPINE 100 MG/1
100 TABLET ORAL
Status: DISCONTINUED | OUTPATIENT
Start: 2022-12-01 | End: 2022-11-30 | Stop reason: HOSPADM

## 2022-11-30 RX ORDER — CLOZAPINE 25 MG/1
50 TABLET ORAL DAILY
Status: DISCONTINUED | OUTPATIENT
Start: 2022-11-30 | End: 2022-11-30

## 2022-11-30 RX ORDER — BENZTROPINE MESYLATE 1 MG/ML
1 INJECTION INTRAMUSCULAR; INTRAVENOUS
Status: DISCONTINUED | OUTPATIENT
Start: 2022-11-30 | End: 2022-12-14 | Stop reason: HOSPADM

## 2022-11-30 RX ORDER — BENZTROPINE MESYLATE 1 MG/1
1 TABLET ORAL EVERY 6 HOURS PRN
Status: CANCELLED | OUTPATIENT
Start: 2022-11-30

## 2022-11-30 RX ORDER — ACETAMINOPHEN 325 MG/1
650 TABLET ORAL EVERY 4 HOURS PRN
Status: DISCONTINUED | OUTPATIENT
Start: 2022-11-30 | End: 2022-12-14 | Stop reason: HOSPADM

## 2022-11-30 RX ORDER — ACETAMINOPHEN 325 MG/1
650 TABLET ORAL EVERY 6 HOURS PRN
Status: CANCELLED | OUTPATIENT
Start: 2022-11-30

## 2022-11-30 RX ORDER — LORATADINE 10 MG/1
10 TABLET ORAL DAILY
Status: DISCONTINUED | OUTPATIENT
Start: 2022-11-30 | End: 2022-11-30 | Stop reason: HOSPADM

## 2022-11-30 RX ORDER — LORATADINE 10 MG/1
10 TABLET ORAL DAILY
Status: CANCELLED | OUTPATIENT
Start: 2022-12-01

## 2022-11-30 RX ORDER — PANTOPRAZOLE SODIUM 40 MG/1
40 TABLET, DELAYED RELEASE ORAL
Status: DISCONTINUED | OUTPATIENT
Start: 2022-12-01 | End: 2022-12-14 | Stop reason: HOSPADM

## 2022-11-30 RX ORDER — FLUTICASONE PROPIONATE 50 MCG
1 SPRAY, SUSPENSION (ML) NASAL 2 TIMES DAILY
Status: DISCONTINUED | OUTPATIENT
Start: 2022-11-30 | End: 2022-11-30 | Stop reason: HOSPADM

## 2022-11-30 RX ORDER — HYDROXYZINE HYDROCHLORIDE 25 MG/1
50 TABLET, FILM COATED ORAL
Status: CANCELLED | OUTPATIENT
Start: 2022-11-30

## 2022-11-30 RX ORDER — HYDROXYZINE HYDROCHLORIDE 25 MG/1
25 TABLET, FILM COATED ORAL
Status: DISCONTINUED | OUTPATIENT
Start: 2022-11-30 | End: 2022-12-07

## 2022-11-30 RX ORDER — FENOFIBRATE 145 MG/1
145 TABLET, COATED ORAL DAILY
Status: DISCONTINUED | OUTPATIENT
Start: 2022-12-01 | End: 2022-12-14 | Stop reason: HOSPADM

## 2022-11-30 RX ORDER — HALOPERIDOL 5 MG/1
5 TABLET ORAL
Status: DISCONTINUED | OUTPATIENT
Start: 2022-11-30 | End: 2022-12-02

## 2022-11-30 RX ORDER — CLOZAPINE 100 MG/1
100 TABLET ORAL
Status: DISCONTINUED | OUTPATIENT
Start: 2022-11-30 | End: 2022-12-01

## 2022-11-30 RX ORDER — FENOFIBRATE 145 MG/1
145 TABLET, COATED ORAL DAILY
Status: DISCONTINUED | OUTPATIENT
Start: 2022-11-30 | End: 2022-11-30 | Stop reason: HOSPADM

## 2022-11-30 RX ORDER — GLYCOPYRROLATE 1 MG/1
1 TABLET ORAL 2 TIMES DAILY
Status: DISCONTINUED | OUTPATIENT
Start: 2022-11-30 | End: 2022-11-30 | Stop reason: HOSPADM

## 2022-11-30 RX ORDER — HALOPERIDOL 5 MG/ML
2.5 INJECTION INTRAMUSCULAR
Status: DISCONTINUED | OUTPATIENT
Start: 2022-11-30 | End: 2022-12-02

## 2022-11-30 RX ORDER — CLOZAPINE 25 MG/1
50 TABLET ORAL DAILY
Status: DISCONTINUED | OUTPATIENT
Start: 2022-12-01 | End: 2022-12-12

## 2022-11-30 RX ORDER — NICOTINE 21 MG/24HR
21 PATCH, TRANSDERMAL 24 HOURS TRANSDERMAL DAILY
Status: DISCONTINUED | OUTPATIENT
Start: 2022-11-30 | End: 2022-11-30 | Stop reason: HOSPADM

## 2022-11-30 RX ORDER — MELATONIN
1000 DAILY
Status: DISCONTINUED | OUTPATIENT
Start: 2022-12-01 | End: 2022-12-14 | Stop reason: HOSPADM

## 2022-11-30 RX ORDER — CLOZAPINE 100 MG/1
200 TABLET ORAL
Status: DISCONTINUED | OUTPATIENT
Start: 2022-11-30 | End: 2022-11-30 | Stop reason: HOSPADM

## 2022-11-30 RX ORDER — HALOPERIDOL 2 MG/1
2 TABLET ORAL
Status: DISCONTINUED | OUTPATIENT
Start: 2022-11-30 | End: 2022-12-02

## 2022-11-30 RX ORDER — HALOPERIDOL 5 MG/ML
5 INJECTION INTRAMUSCULAR
Status: DISCONTINUED | OUTPATIENT
Start: 2022-11-30 | End: 2022-12-14 | Stop reason: HOSPADM

## 2022-11-30 RX ORDER — HALOPERIDOL 5 MG/ML
5 INJECTION INTRAMUSCULAR
Status: CANCELLED | OUTPATIENT
Start: 2022-11-30

## 2022-11-30 RX ORDER — ACETAMINOPHEN 325 MG/1
975 TABLET ORAL EVERY 6 HOURS PRN
Status: CANCELLED | OUTPATIENT
Start: 2022-11-30

## 2022-11-30 RX ORDER — HYDROXYZINE 50 MG/1
50 TABLET, FILM COATED ORAL
Status: DISCONTINUED | OUTPATIENT
Start: 2022-11-30 | End: 2022-12-07

## 2022-11-30 RX ORDER — HALOPERIDOL 5 MG/ML
2.5 INJECTION INTRAMUSCULAR
Status: CANCELLED | OUTPATIENT
Start: 2022-11-30

## 2022-11-30 RX ORDER — TRAZODONE HYDROCHLORIDE 100 MG/1
100 TABLET ORAL
Status: CANCELLED | OUTPATIENT
Start: 2022-11-30

## 2022-11-30 RX ORDER — HYDROXYZINE HYDROCHLORIDE 25 MG/1
25 TABLET, FILM COATED ORAL
Status: CANCELLED | OUTPATIENT
Start: 2022-11-30

## 2022-11-30 RX ORDER — LORAZEPAM 2 MG/ML
1 INJECTION INTRAMUSCULAR
Status: CANCELLED | OUTPATIENT
Start: 2022-11-30

## 2022-11-30 RX ORDER — LORAZEPAM 2 MG/ML
2 INJECTION INTRAMUSCULAR
Status: DISCONTINUED | OUTPATIENT
Start: 2022-11-30 | End: 2022-12-14 | Stop reason: HOSPADM

## 2022-11-30 RX ORDER — BENZTROPINE MESYLATE 1 MG/ML
0.5 INJECTION INTRAMUSCULAR; INTRAVENOUS
Status: DISCONTINUED | OUTPATIENT
Start: 2022-11-30 | End: 2022-12-02

## 2022-11-30 RX ORDER — ACETAMINOPHEN 325 MG/1
650 TABLET ORAL EVERY 4 HOURS PRN
Status: CANCELLED | OUTPATIENT
Start: 2022-11-30

## 2022-11-30 RX ORDER — NICOTINE 21 MG/24HR
1 PATCH, TRANSDERMAL 24 HOURS TRANSDERMAL DAILY
Status: DISCONTINUED | OUTPATIENT
Start: 2022-12-01 | End: 2022-12-14 | Stop reason: HOSPADM

## 2022-11-30 RX ORDER — NICOTINE 21 MG/24HR
1 PATCH, TRANSDERMAL 24 HOURS TRANSDERMAL DAILY
Status: CANCELLED | OUTPATIENT
Start: 2022-11-30

## 2022-11-30 RX ORDER — ACETAMINOPHEN 325 MG/1
650 TABLET ORAL EVERY 6 HOURS PRN
Status: DISCONTINUED | OUTPATIENT
Start: 2022-11-30 | End: 2022-12-14 | Stop reason: HOSPADM

## 2022-11-30 RX ORDER — VENLAFAXINE HYDROCHLORIDE 75 MG/1
75 CAPSULE, EXTENDED RELEASE ORAL DAILY
Status: DISCONTINUED | OUTPATIENT
Start: 2022-11-30 | End: 2022-11-30 | Stop reason: HOSPADM

## 2022-11-30 RX ORDER — ACETAMINOPHEN 325 MG/1
975 TABLET ORAL EVERY 6 HOURS PRN
Status: DISCONTINUED | OUTPATIENT
Start: 2022-11-30 | End: 2022-12-14 | Stop reason: HOSPADM

## 2022-11-30 RX ORDER — MAGNESIUM HYDROXIDE/ALUMINUM HYDROXICE/SIMETHICONE 120; 1200; 1200 MG/30ML; MG/30ML; MG/30ML
30 SUSPENSION ORAL EVERY 4 HOURS PRN
Status: DISCONTINUED | OUTPATIENT
Start: 2022-11-30 | End: 2022-12-14 | Stop reason: HOSPADM

## 2022-11-30 RX ORDER — POLYETHYLENE GLYCOL 3350 17 G/17G
17 POWDER, FOR SOLUTION ORAL DAILY PRN
Status: CANCELLED | OUTPATIENT
Start: 2022-11-30

## 2022-11-30 RX ORDER — FENOFIBRATE 145 MG/1
145 TABLET, COATED ORAL DAILY
Status: CANCELLED | OUTPATIENT
Start: 2022-12-01

## 2022-11-30 RX ORDER — LORATADINE 10 MG/1
10 TABLET ORAL DAILY
Status: DISCONTINUED | OUTPATIENT
Start: 2022-12-01 | End: 2022-12-14 | Stop reason: HOSPADM

## 2022-11-30 RX ORDER — MAGNESIUM HYDROXIDE/ALUMINUM HYDROXICE/SIMETHICONE 120; 1200; 1200 MG/30ML; MG/30ML; MG/30ML
30 SUSPENSION ORAL EVERY 4 HOURS PRN
Status: CANCELLED | OUTPATIENT
Start: 2022-11-30

## 2022-11-30 RX ORDER — LORAZEPAM 2 MG/ML
2 INJECTION INTRAMUSCULAR
Status: CANCELLED | OUTPATIENT
Start: 2022-11-30

## 2022-11-30 RX ORDER — CLOZAPINE 100 MG/1
100 TABLET ORAL
Status: DISCONTINUED | OUTPATIENT
Start: 2022-11-30 | End: 2022-11-30

## 2022-11-30 RX ORDER — VENLAFAXINE HYDROCHLORIDE 75 MG/1
75 CAPSULE, EXTENDED RELEASE ORAL DAILY
Status: DISCONTINUED | OUTPATIENT
Start: 2022-12-01 | End: 2022-12-05

## 2022-11-30 RX ORDER — POLYETHYLENE GLYCOL 3350 17 G/17G
17 POWDER, FOR SOLUTION ORAL DAILY PRN
Status: DISCONTINUED | OUTPATIENT
Start: 2022-11-30 | End: 2022-12-08

## 2022-11-30 RX ORDER — PANTOPRAZOLE SODIUM 40 MG/1
40 TABLET, DELAYED RELEASE ORAL
Status: DISCONTINUED | OUTPATIENT
Start: 2022-11-30 | End: 2022-11-30 | Stop reason: HOSPADM

## 2022-11-30 RX ORDER — LORAZEPAM 2 MG/ML
2 INJECTION INTRAMUSCULAR EVERY 6 HOURS PRN
Status: CANCELLED | OUTPATIENT
Start: 2022-11-30

## 2022-11-30 RX ORDER — HYDROXYZINE 50 MG/1
100 TABLET, FILM COATED ORAL
Status: DISCONTINUED | OUTPATIENT
Start: 2022-11-30 | End: 2022-12-07

## 2022-11-30 RX ORDER — LORAZEPAM 1 MG/1
2 TABLET ORAL EVERY 6 HOURS PRN
Status: DISCONTINUED | OUTPATIENT
Start: 2022-11-30 | End: 2022-11-30 | Stop reason: HOSPADM

## 2022-11-30 RX ORDER — DIPHENHYDRAMINE HYDROCHLORIDE 50 MG/ML
50 INJECTION INTRAMUSCULAR; INTRAVENOUS EVERY 6 HOURS PRN
Status: DISCONTINUED | OUTPATIENT
Start: 2022-11-30 | End: 2022-12-07

## 2022-11-30 RX ORDER — HALOPERIDOL 1 MG/1
2 TABLET ORAL
Status: CANCELLED | OUTPATIENT
Start: 2022-11-30

## 2022-11-30 RX ORDER — FLUTICASONE PROPIONATE 50 MCG
1 SPRAY, SUSPENSION (ML) NASAL 2 TIMES DAILY
Status: DISCONTINUED | OUTPATIENT
Start: 2022-11-30 | End: 2022-12-14 | Stop reason: HOSPADM

## 2022-11-30 RX ORDER — FLUTICASONE PROPIONATE 50 MCG
1 SPRAY, SUSPENSION (ML) NASAL 2 TIMES DAILY
Status: CANCELLED | OUTPATIENT
Start: 2022-11-30

## 2022-11-30 RX ORDER — LORAZEPAM 0.5 MG/1
0.5 TABLET ORAL 2 TIMES DAILY
Status: DISCONTINUED | OUTPATIENT
Start: 2022-11-30 | End: 2022-12-01

## 2022-11-30 RX ORDER — ALBUTEROL SULFATE 90 UG/1
2 AEROSOL, METERED RESPIRATORY (INHALATION) EVERY 4 HOURS PRN
Status: CANCELLED | OUTPATIENT
Start: 2022-11-30

## 2022-11-30 RX ORDER — LORAZEPAM 0.5 MG/1
0.5 TABLET ORAL
Status: DISCONTINUED | OUTPATIENT
Start: 2022-11-30 | End: 2022-12-01

## 2022-11-30 RX ADMIN — FENOFIBRATE 145 MG: 145 TABLET, FILM COATED ORAL at 08:22

## 2022-11-30 RX ADMIN — LORAZEPAM 0.5 MG: 0.5 TABLET ORAL at 20:27

## 2022-11-30 RX ADMIN — LORATADINE 10 MG: 10 TABLET ORAL at 08:23

## 2022-11-30 RX ADMIN — LORAZEPAM 2 MG: 1 TABLET ORAL at 08:22

## 2022-11-30 RX ADMIN — GLYCOPYRROLATE 1 MG: 1 TABLET ORAL at 08:22

## 2022-11-30 RX ADMIN — HALOPERIDOL 5 MG: 5 TABLET ORAL at 18:41

## 2022-11-30 RX ADMIN — VENLAFAXINE HYDROCHLORIDE 75 MG: 75 CAPSULE, EXTENDED RELEASE ORAL at 08:23

## 2022-11-30 RX ADMIN — PANTOPRAZOLE SODIUM 40 MG: 40 TABLET, DELAYED RELEASE ORAL at 07:17

## 2022-11-30 RX ADMIN — Medication 1000 UNITS: at 08:23

## 2022-11-30 RX ADMIN — HYDROXYZINE HYDROCHLORIDE 100 MG: 50 TABLET, FILM COATED ORAL at 18:40

## 2022-11-30 RX ADMIN — CLOZAPINE 100 MG: 100 TABLET ORAL at 23:16

## 2022-11-30 RX ADMIN — FLUTICASONE PROPIONATE 1 SPRAY: 50 SPRAY, METERED NASAL at 08:23

## 2022-11-30 RX ADMIN — NICOTINE 21 MG: 21 PATCH, EXTENDED RELEASE TRANSDERMAL at 08:23

## 2022-11-30 RX ADMIN — FLUTICASONE PROPIONATE 1 SPRAY: 50 SPRAY, METERED NASAL at 20:26

## 2022-11-30 RX ADMIN — CLOZAPINE 50 MG: 25 TABLET ORAL at 16:10

## 2022-11-30 NOTE — ED NOTES
Both pt and Dr Jace Drummond signed the 61 [phone] document      Puneet Ruano [facility], [address]  11/29/22 19:56

## 2022-11-30 NOTE — PLAN OF CARE
Problem: Alteration in Thoughts and Perception  Goal: Treatment Goal: Gain control of psychotic behaviors/thinking, reduce/eliminate presenting symptoms and demonstrate improved reality functioning upon discharge  Outcome: Progressing  Goal: Verbalize thoughts and feelings  Description: Interventions:  - Promote a nonjudgmental and trusting relationship with the patient through active listening and therapeutic communication  - Assess patient's level of functioning, behavior and potential for risk  - Engage patient in 1 on 1 interactions  - Encourage patient to express fears, feelings, frustrations, and discuss symptoms    - [place] patient to reality, help patient recognize reality-based thinking   - Administer medications as ordered and assess for potential side effects  - Provide the patient education related to the signs and symptoms of the illness and desired effects of prescribed medications  Outcome: Progressing  Goal: Refrain from acting on delusional thinking/internal stimuli  Description: Interventions:  - Monitor patient closely, per order   - Utilize least restrictive measures   - Set reasonable limits, give positive feedback for acceptable   - Administer medications as ordered and monitor of potential side effects  Outcome: Progressing  Goal: Agree to be compliant with medication regime, as prescribed and report medication side effects  Description: Interventions:  - Offer appropriate PRN medication and supervise ingestion; conduct AIMS, as needed   Outcome: Progressing  Goal: Attend and participate in unit activities, including therapeutic, recreational, and educational groups  Description: Interventions:  -Encourage Visitation and family involvement in care  Outcome: Progressing  Goal: Recognize dysfunctional thoughts, communicate reality-based thoughts at the time of discharge  Description: Interventions:  - Provide medication and psycho-education to assist patient in compliance and developing insight into his/her illness   Outcome: Progressing  Goal: Complete daily ADLs, including personal hygiene independently, as able  Description: Interventions:  - Observe, teach, and assist patient with ADLS  - Monitor and promote a balance of rest/activity, with adequate nutrition and elimination   Outcome: Progressing     Problem: Ineffective Coping  Goal: Cooperates with admission process  Description: Interventions:   - Complete admission process  Outcome: Progressing  Goal: Identifies ineffective coping skills  Outcome: Progressing  Goal: Identifies healthy coping skills  Outcome: Progressing  Goal: Demonstrates healthy coping skills  Outcome: Progressing  Goal: Participates in unit activities  Description: Interventions:  - Provide therapeutic environment   - Provide required programming   - Redirect inappropriate behaviors   Outcome: Progressing  Goal: Patient/Family participate in treatment and DC plans  Description: Interventions:  - Provide therapeutic environment  Outcome: Progressing  Goal: Patient/Family verbalizes awareness of resources  Outcome: Progressing  Goal: Understands least restrictive measures  Description: Interventions:  - Utilize least restrictive behavior  Outcome: Progressing  Goal: Free from restraint events  Description: - Utilize least restrictive measures   - Provide behavioral interventions   - Redirect inappropriate behaviors   Outcome: Progressing     Problem: Risk for Self Injury/Neglect  Goal: Treatment Goal: Remain safe during length of stay, learn and adopt new coping skills, and be free of self-injurious ideation, impulses and acts at the time of discharge  Outcome: Progressing  Goal: Verbalize thoughts and feelings  Description: Interventions:  - Assess and re-assess patient's lethality and potential for self-injury  - Engage patient in 1:1 interactions, daily, for a minimum of 15 minutes  - Encourage patient to express feelings, fears, frustrations, hopes  - Establish rapport/trust with patient   Outcome: Progressing  Goal: Refrain from harming self  Description: Interventions:  - Monitor patient closely, per order  - Develop a trusting relationship  - Supervise medication ingestion, monitor effects and side effects   Outcome: Progressing  Goal: Attend and participate in unit activities, including therapeutic, recreational, and educational groups  Description: Interventions:  - Provide therapeutic and educational activities daily, encourage attendance and participation, and document same in the medical record  - Obtain collateral information, encourage visitation and family involvement in care   Outcome: Progressing  Goal: Recognize maladaptive responses and adopt new coping mechanisms  Outcome: Progressing  Goal: Complete daily ADLs, including personal hygiene independently, as able  Description: Interventions:  - Observe, teach, and assist patient with ADLS  - Monitor and promote a balance of rest/activity, with adequate nutrition and elimination  Outcome: Progressing     Problem: Depression  Goal: Treatment Goal: Demonstrate behavioral control of depressive symptoms, verbalize feelings of improved mood/affect, and adopt new coping skills prior to discharge  Outcome: Progressing  Goal: Verbalize thoughts and feelings  Description: Interventions:  - Assess and re-assess patient's level of risk   - Engage patient in 1:1 interactions, daily, for a minimum of 15 minutes   - Encourage patient to express feelings, fears, frustrations, hopes   Outcome: Progressing  Goal: Refrain from harming self  Description: Interventions:  - Monitor patient closely, per order   - Supervise medication ingestion, monitor effects and side effects   Outcome: Progressing  Goal: Refrain from isolation  Description: Interventions:  - Develop a trusting relationship   - Encourage socialization   Outcome: Progressing  Goal: Refrain from self-neglect  Outcome: Progressing  Goal: Attend and participate in unit activities, including therapeutic, recreational, and educational groups  Description: Interventions:  - Provide therapeutic and educational activities daily, encourage attendance and participation, and document same in the medical record   Outcome: Progressing  Goal: Complete daily ADLs, including personal hygiene independently, as able  Description: Interventions:  - Observe, teach, and assist patient with ADLS  -  Monitor and promote a balance of rest/activity, with adequate nutrition and elimination   Outcome: Progressing     Problem: Anxiety  Goal: Anxiety is at manageable level  Description: Interventions:  - Assess and monitor patient's anxiety level  - Monitor for signs and symptoms (heart palpitations, chest pain, shortness of breath, headaches, nausea, feeling jumpy, restlessness, irritable, apprehensive)  - Collaborate with interdisciplinary team and initiate plan and interventions as ordered  - [place] patient to unit/surroundings  - Explain treatment plan  - Encourage participation in care  - Encourage verbalization of concerns/fears  - Identify coping mechanisms  - Assist in developing anxiety-reducing skills  - Administer/offer alternative therapies  - Limit or eliminate stimulants  Outcome: Progressing     Problem: DEPRESSION  Goal: Will be euthymic at discharge  Description: INTERVENTIONS:  - Administer medication as ordered  - Provide emotional support via 1:1 interaction with staff  - Encourage involvement in milieu/groups/activities  - Monitor for social isolation  Outcome: Progressing     Problem: BEHAVIOR  Goal: Pt/Family maintain appropriate behavior and adhere to behavioral management agreement, if implemented  Description: INTERVENTIONS:  - Assess the family dynamic   - Encourage verbalization of thoughts and concerns in a socially appropriate manner  - Assess patient/family's coping skills and non-compliant behavior (including use of illegal substances)    - Utilize positive, consistent limit setting strategies supporting safety of patient, staff and others  - Initiate consult with Case Management, Spiritual Care or other ancillary services as appropriate  - If a patient's/visitor's behavior jeopardizes the safety of the patient, staff, or others, refer to organization procedure     - Notify Security of behavior or suspected illegal substances which indicate the need for search of the patient and/or belongings  - Encourage participation in the decision making process about a behavioral management agreement; implement if patient meets criteria  Outcome: Progressing     Problem: ANXIETY  Goal: Will report anxiety at manageable levels  Description: INTERVENTIONS:  - Administer medication as ordered  - Teach and encourage coping skills  - Provide emotional support  - Assess patient/family for anxiety and ability to cope  Outcome: Progressing  Goal: By discharge: Patient will verbalize 2 strategies to deal with anxiety  Description: Interventions:  - Identify any obvious source/trigger to anxiety  - Staff will assist patient in applying identified coping technique/skills  - Encourage attendance of scheduled groups and activities  Outcome: Progressing

## 2022-11-30 NOTE — ED NOTES
Insurance Authorization for admission:   Phone call placed to [facility]  Phone number: [phone]  Spoke to Hernandez Wayne  14 days approved  Level of care: 201  Review on **  Authorization # when bed is found  EVS (Eligibility Verification System) called - [phone]  Automated system indicates: MA eligible  Insurance Authorization for Transportation:    Phone call placed to **  Phone number **  Spoke to **     Authorization #: Chela Baer [facility], [address] [place] [address] Drive  11/29/22 22:30

## 2022-11-30 NOTE — ED NOTES
Patient is accepted at [facility]  Patient is accepted by [facility]  Transportation is arranged with [facility]  Transportation is scheduled for one way transport  Patient may go to the floor before 1700  Nurse report is to be called to [phone] prior to patient transfer

## 2022-11-30 NOTE — NURSING NOTE
Pt AAOX4, calm, cooperative, and pleasant during admission  Stated reason for visit is essentially that pt was not compliant with follow up or medications and became "psychotic", hearing voices, and unable to sleep  Pt states was recently discharged from older adult unit about 2 weeks ago  States attempted OD two times in last year not resulting in hospitalization  Denies s/i, h/i currently  Endorses auditory hallucinations that "make smart remarks" to her  Denies that they are command in nature to harm herself or others  Denies drug use or etoh x2 years sober  UDS negative this visit  Last pos UA through [facility] was March 2021  Oriented to unit, questions answered, vitals WNL  No distress

## 2022-11-30 NOTE — EMTALA/ACUTE CARE TRANSFER
[address]  [place] [address]  Dept: [phone]      EMTALA TRANSFER CONSENT    NAME Trung SHARIF 1970                              MRN [id number]    I have been informed of my rights regarding examination, treatment, and transfer   by Dr Doy Bumpers,*    Benefits: Specialized equipment and/or services available at the receiving facility (Include comment)________________________ (Psychiatrist)    Risks: Potential for delay in receiving treatment, Possible worsening of condition or death during transfer      Consent for Transfer:  I acknowledge that my medical condition has been evaluated and explained to me by the emergency department physician or other qualified medical person and/or my attending physician, who has recommended that I be transferred to the service of  Accepting Physician: Dodie Dubin, CR-NP at [address] [place] [address] Name, [address] : [address] [place] [address] adult behavioral health unit  The above potential benefits of such transfer, the potential risks associated with such transfer, and the probable risks of not being transferred have been explained to me, and I fully understand them  The doctor has explained that, in my case, the benefits of transfer outweigh the risks  I agree to be transferred  I authorize the performance of emergency medical procedures and treatments upon me in both transit and upon arrival at the receiving facility  Additionally, I authorize the release of any and all medical records to the receiving facility and request they be transported with me, if possible  I understand that the safest mode of transportation during a medical emergency is an ambulance and that the Hospital advocates the use of this mode of transport   Risks of traveling to the receiving facility by car, including absence of medical control, life sustaining equipment, such as oxygen, and medical personnel has been explained to me and I fully understand them  (OSEI CORRECT BOX BELOW)  [  ]  I consent to the stated transfer and to be transported by ambulance/helicopter  [  ]  I consent to the stated transfer, but refuse transportation by ambulance and accept full responsibility for my transportation by car  I understand the risks of non-ambulance transfers and I exonerate the Hospital and its staff from any deterioration in my condition that results from this refusal     X___________________________________________    DATE  22  TIME________  Signature of patient or legally responsible individual signing on patient behalf           RELATIONSHIP TO PATIENT_________________________          Provider Certification    NAME Marina Nichols                                         1970                              MRN [id number]    A medical screening exam was performed on the above named patient  Based on the examination:    Condition Necessitating Transfer The primary encounter diagnosis was Suicidal ideation  Diagnoses of Gastroesophageal reflux disease without esophagitis, Emphysema lung (HCC), Dependence on nicotine from cigarettes, Mixed stress and urge urinary incontinence, Vitamin D deficiency, Degenerative disc disease, lumbar, Slow transit constipation, and History of alcohol dependence ([facility] [address] ) were also pertinent to this visit      Patient Condition: The patient has been stabilized such that within reasonable medical probability, no material deterioration of the patient condition or the condition of the unborn child(yeni) is likely to result from the transfer    Reason for Transfer: Level of Care needed not available at this facility (Inpatient psychiatric unit)    Transfer Requirements: [facility] adult behavioral health unit   · Space available and qualified personnel available for treatment as acknowledged by    · Agreed to accept transfer and to provide appropriate medical treatment as acknowledged by       CAPRI Glez  · Appropriate medical records of the examination and treatment of the patient are provided at the time of transfer   [address] [facility], [address] _______  · Transfer will be performed by qualified personnel from    and appropriate transfer equipment as required, including the use of necessary and appropriate life support measures  Provider Certification: I have examined the patient and explained the following risks and benefits of being transferred/refusing transfer to the patient/family:  The patient is stable for psychiatric transfer because they are medically stable, and is protected from harming him/herself or others during transport      Based on these reasonable risks and benefits to the patient and/or the unborn child(yeni), and based upon the information available at the time of the patient’s examination, I certify that the medical benefits reasonably to be expected from the provision of appropriate medical treatments at another medical facility outweigh the increasing risks, if any, to the individual’s medical condition, and in the case of labor to the unborn child, from effecting the transfer      X____________________________________________ DATE 11/30/22        TIME_______      ORIGINAL - SEND TO MEDICAL RECORDS   COPY - SEND WITH PATIENT DURING TRANSFER

## 2022-11-30 NOTE — ED NOTES
CW faxed pt's 12 and face sheet to [facility] of Intake for review at [facility] when a bed becomes available  It is pt's request to go to [facility] Alyne Kawasaki, [place], CW  11/29/22 22:49

## 2022-11-30 NOTE — ED NOTES
SH 1 pt requested to shower  Security stated that ED 27 is currently occupied, but in ~45 minutes the pt in ED 27 will be moved out into hallway temporarily in order for Shweta Frankkierra Michelle 1 to shower  Will check in around 1015       Napolean Duel  11/30/22 9426

## 2022-11-30 NOTE — ED NOTES
Pt opted to bathe in secured holding bathroom instead of shower  Lunch tray ordered       Dinora Murcia  11/30/22 1044 Hemigard Intro: Due to skin fragility and wound tension, it was decided to use HEMIGARD adhesive retention suture devices to permit a linear closure. The skin was cleaned and dried for a 6cm distance away from the wound. Excessive hair, if present, was removed to allow for adhesion.

## 2022-11-30 NOTE — ED NOTES
CW spoke with [facility] to log pre-cert request     Lisa Martinez, [facility], [address]  11/29/22 21:52

## 2022-11-30 NOTE — ED NOTES
Per Intake, pt needs a new covid because she will not arrive to [facility] before 5pm

## 2022-11-30 NOTE — ED NOTES
Pt remain in acharya bed , lying on stretcher , is compliant, ate dinner , medicated with ativan , pt has no verbalized any ideation to hurt self or others since been in er     Odilia Beckett RN  11/29/22 1929

## 2022-12-01 RX ORDER — LORAZEPAM 0.5 MG/1
0.5 TABLET ORAL EVERY 8 HOURS PRN
Status: DISCONTINUED | OUTPATIENT
Start: 2022-12-01 | End: 2022-12-06

## 2022-12-01 RX ORDER — LORAZEPAM 0.5 MG/1
0.5 TABLET ORAL DAILY
Status: DISCONTINUED | OUTPATIENT
Start: 2022-12-02 | End: 2022-12-14 | Stop reason: HOSPADM

## 2022-12-01 RX ORDER — CLOZAPINE 100 MG/1
200 TABLET ORAL
Status: DISCONTINUED | OUTPATIENT
Start: 2022-12-02 | End: 2022-12-04

## 2022-12-01 RX ORDER — CLOZAPINE 100 MG/1
200 TABLET ORAL
Status: DISCONTINUED | OUTPATIENT
Start: 2022-12-01 | End: 2022-12-01

## 2022-12-01 RX ORDER — CLOZAPINE 25 MG/1
50 TABLET ORAL
Status: DISCONTINUED | OUTPATIENT
Start: 2022-12-03 | End: 2022-12-05

## 2022-12-01 RX ORDER — LORAZEPAM 1 MG/1
1 TABLET ORAL
Status: DISCONTINUED | OUTPATIENT
Start: 2022-12-01 | End: 2022-12-04

## 2022-12-01 RX ORDER — DOCUSATE SODIUM 100 MG/1
100 CAPSULE, LIQUID FILLED ORAL 2 TIMES DAILY
Status: DISCONTINUED | OUTPATIENT
Start: 2022-12-01 | End: 2022-12-01

## 2022-12-01 RX ORDER — HYDROCORTISONE 25 MG/G
CREAM TOPICAL 4 TIMES DAILY PRN
Status: DISCONTINUED | OUTPATIENT
Start: 2022-12-01 | End: 2022-12-14 | Stop reason: HOSPADM

## 2022-12-01 RX ORDER — TRAZODONE HYDROCHLORIDE 50 MG/1
25 TABLET ORAL EVERY 8 HOURS PRN
Status: DISCONTINUED | OUTPATIENT
Start: 2022-12-01 | End: 2022-12-05

## 2022-12-01 RX ADMIN — FENOFIBRATE 145 MG: 145 TABLET, COATED ORAL at 08:23

## 2022-12-01 RX ADMIN — HYDROCORTISONE 2.5%: 25 CREAM TOPICAL at 18:00

## 2022-12-01 RX ADMIN — LORATADINE 10 MG: 10 TABLET ORAL at 08:23

## 2022-12-01 RX ADMIN — HALOPERIDOL 5 MG: 5 TABLET ORAL at 10:57

## 2022-12-01 RX ADMIN — PANTOPRAZOLE SODIUM 40 MG: 40 TABLET, DELAYED RELEASE ORAL at 08:26

## 2022-12-01 RX ADMIN — TRAZODONE HYDROCHLORIDE 25 MG: 50 TABLET ORAL at 18:34

## 2022-12-01 RX ADMIN — LORAZEPAM 0.5 MG: 0.5 TABLET ORAL at 08:23

## 2022-12-01 RX ADMIN — DOCUSATE SODIUM 100 MG: 100 CAPSULE, LIQUID FILLED ORAL at 11:52

## 2022-12-01 RX ADMIN — CLOZAPINE 150 MG: 100 TABLET ORAL at 21:54

## 2022-12-01 RX ADMIN — VENLAFAXINE HYDROCHLORIDE 75 MG: 75 CAPSULE, EXTENDED RELEASE ORAL at 08:23

## 2022-12-01 RX ADMIN — HALOPERIDOL 5 MG: 5 TABLET ORAL at 15:51

## 2022-12-01 RX ADMIN — FLUTICASONE PROPIONATE 1 SPRAY: 50 SPRAY, METERED NASAL at 08:23

## 2022-12-01 RX ADMIN — CLOZAPINE 50 MG: 25 TABLET ORAL at 08:23

## 2022-12-01 RX ADMIN — LORAZEPAM 1 MG: 1 TABLET ORAL at 21:55

## 2022-12-01 RX ADMIN — NICOTINE 1 PATCH: 21 PATCH, EXTENDED RELEASE TRANSDERMAL at 08:23

## 2022-12-01 RX ADMIN — FLUTICASONE PROPIONATE 1 SPRAY: 50 SPRAY, METERED NASAL at 18:00

## 2022-12-01 RX ADMIN — Medication 1000 UNITS: at 08:23
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
## 2022-12-01 NOTE — NURSING NOTE
Pt  Belongings to room:  Pants X3  T-shirt X3  Socks X3  Undies X3  Bra 2  Book  Glasses  Slippers    Pt  Belongings to storage:  Robe  Cosmetics  Jacket  Boots  Car   Purse  Book  Undies X4  T-shirt  Leggings X2  Shorts  Tank top X3  Hoodie  Socks X2  Glasses    Pt   Belongings to drawer:  Phone    Birth certificate   [address]  [place]  Loose change  Wallet:  PA ID  Healthcare card X4  Debit X2    **Inhaler to nurse

## 2022-12-01 NOTE — PROGRESS NOTES
12/01/22   Team Meeting   Meeting Type Daily Rounds   Team Members Present   Team Members Present Physician;Nurse;   Physician Team Member Dr Fly Salter MD; HOSP DR RADHA SILVA [place]   Nursing Team Member [facility]; [facility], RN   Social Work Team Member Marilyn Perez, [place]   Patient/Family Present   Patient Present No   Patient's Family Present No     New 13, DC recently from [facility] AIP, 11 SA attempts hx, hx drug use, scant conversations, paranoid, calm, cooperative, care plan to be reviewed today

## 2022-12-01 NOTE — TREATMENT PLAN
TREATMENT PLAN REVIEW - [address] Adonis 47 y o  1970 female MRN: [id number]    [address] Room / Bed: [place] [address]/[facility] [id number] Encounter: [id number]          Admit Date/Time:  11/30/2022  5:47 PM    Treatment Team: Attending Provider: Ashlyn Mahajan MD; Consulting Physician: Amelia Moulton MD; Patient Care Assistant: Nano Gerber; Registered Nurse: Berlin Houston RN; Patient Care Technician: Chel Louise; Patient Care Assistant: Haley Vuong; : Dominic Sapp; Recreational Therapist: Luster Scheuermann;  Registered Nurse: Tamar Velasco RN; Registered Nurse: Nakia Velazquez RN; Patient Care Assistant: Hernesto Grey    Diagnosis: Principal Problem:    Schizoaffective disorder, bipolar type [facility])    Patient Strengths: negotiates basic needs     Patient Barriers: chronic mental illness    Short Term Goals: decrease in depressive symptoms, decrease in anxiety symptoms    Long Term Goals: improvement in depression, improvement in anxiety, no self abusive behavior, improved reality testing, improved impulse control, improved insight, no agitation on the unit, no aggressive behavior on the unit, able to express basic needs, acceptance of need for psychiatric medications, acceptance of need for psychiatric treatment, acceptance of need for psychiatric follow up after discharge, acceptance of psychiatric diagnosis, adequate self care, adequate sleep    Progress Towards Goals: continue psychiatric medications as prescribed    Recommended Treatment: medication management, patient medication education, group therapy, milieu therapy, continued Behavioral Health psychiatric evaluation/assessment process     Treatment Frequency: daily medication monitoring, group and milieu therapy daily, monitoring through interdisciplinary rounds, monitoring through weekly patient care conferences    Expected Discharge Date:  tbd    Discharge Plan: placement in group home    Treatment Plan Created/Updated By: Marian Stanford MD

## 2022-12-01 NOTE — PROGRESS NOTES
12/01/22 1115   Team Meeting   Meeting Type Tx Team Meeting   Team Members Present   Team Members Present Physician;;Nurse   Physician Team Member Dr Jessica Juarez MD   Nursing Team Member Kyra Maxwell RN   Social Work Team Member Reyes Corona, [place]   Patient/Family Present   Patient Present Yes   Patient's Family Present No   Patient and treatment team met and reviewed pt strengths, limitations, coping skills, treatment plan and goals; pt agreeable and signed; copy on chart

## 2022-12-01 NOTE — PROGRESS NOTES
12/01/22 1000   Activity/Group Checklist   Group   ([facility])   Attendance Attended   Attendance Duration (min) 46-60   Interactions Interacted appropriately   Affect/Mood Appropriate   Goals Achieved Identified feelings; Discussed coping strategies; Identified resources and support systems; Able to listen to others; Able to engage in interactions; Able to reflect/comment on own behavior;Able to manage/cope with feelings

## 2022-12-01 NOTE — PROGRESS NOTES
12/01/22 1300   Activity/Group Checklist   Group   ([facility])   Attendance Attended   Attendance Duration (min) 31-45   Interactions Interacted appropriately   Affect/Mood Appropriate;Bright;Calm   Goals Achieved Identified feelings; Discussed coping strategies; Identified triggers; Able to listen to others; Able to engage in interactions; Able to reflect/comment on own behavior;Able to manage/cope with feelings

## 2022-12-01 NOTE — PROGRESS NOTES
12/01/22 0855   Patient Intake   Living Arrangement Other (Comment)  (Group Home)   Can patient return home?  Yes   Address to be Discharge to: [address] [place] [address]   Patient's Telephone Number [phone]   Access to Firearms No   Work History Unemployed   Admission Status   [facility] Ad[facility]   Patient History   Presenting Problems see ED note   Treatment History see psychosocial   Currently in Treatment Yes   Current Psychiatrist/Therapist Dr Darling Owens MD; Ghada Manuel Team members [phone]   Medical Problems See H&P   Legal Issues none reported   Substance Abuse No   Crisis Info   Release of Information Signed Yes  [facility] ACT Team members [phone]  Rishi Barron ([facility]) [phone])

## 2022-12-01 NOTE — PLAN OF CARE
Problem: Alteration in Thoughts and Perception  Goal: Treatment Goal: Gain control of psychotic behaviors/thinking, reduce/eliminate presenting symptoms and demonstrate improved reality functioning upon discharge  Outcome: Not Progressing  Goal: Verbalize thoughts and feelings  Description: Interventions:  - Promote a nonjudgmental and trusting relationship with the patient through active listening and therapeutic communication  - Assess patient's level of functioning, behavior and potential for risk  - Engage patient in 1 on 1 interactions  - Encourage patient to express fears, feelings, frustrations, and discuss symptoms    - [place] patient to reality, help patient recognize reality-based thinking   - Administer medications as ordered and assess for potential side effects  - Provide the patient education related to the signs and symptoms of the illness and desired effects of prescribed medications  Outcome: Not Progressing  Goal: Refrain from acting on delusional thinking/internal stimuli  Description: Interventions:  - Monitor patient closely, per order   - Utilize least restrictive measures   - Set reasonable limits, give positive feedback for acceptable   - Administer medications as ordered and monitor of potential side effects  Outcome: Not Progressing  Goal: Agree to be compliant with medication regime, as prescribed and report medication side effects  Description: Interventions:  - Offer appropriate PRN medication and supervise ingestion; conduct AIMS, as needed   Outcome: Not Progressing  Goal: Attend and participate in unit activities, including therapeutic, recreational, and educational groups  Description: Interventions:  -Encourage Visitation and family involvement in care  Outcome: Not Progressing  Goal: Recognize dysfunctional thoughts, communicate reality-based thoughts at the time of discharge  Description: Interventions:  - Provide medication and psycho-education to assist patient in compliance and developing insight into his/her illness   Outcome: Not Progressing  Goal: Complete daily ADLs, including personal hygiene independently, as able  Description: Interventions:  - Observe, teach, and assist patient with ADLS  - Monitor and promote a balance of rest/activity, with adequate nutrition and elimination   Outcome: Not Progressing     Problem: Ineffective Coping  Goal: Cooperates with admission process  Description: Interventions:   - Complete admission process  Outcome: Not Progressing  Goal: Identifies ineffective coping skills  Outcome: Not Progressing  Goal: Identifies healthy coping skills  Outcome: Not Progressing  Goal: Demonstrates healthy coping skills  Outcome: Not Progressing  Goal: Participates in unit activities  Description: Interventions:  - Provide therapeutic environment   - Provide required programming   - Redirect inappropriate behaviors   Outcome: Not Progressing  Goal: Patient/Family participate in treatment and DC plans  Description: Interventions:  - Provide therapeutic environment  Outcome: Not Progressing  Goal: Patient/Family verbalizes awareness of resources  Outcome: Not Progressing  Goal: Understands least restrictive measures  Description: Interventions:  - Utilize least restrictive behavior  Outcome: Not Progressing  Goal: Free from restraint events  Description: - Utilize least restrictive measures   - Provide behavioral interventions   - Redirect inappropriate behaviors   Outcome: Not Progressing     Problem: Risk for Self Injury/Neglect  Goal: Treatment Goal: Remain safe during length of stay, learn and adopt new coping skills, and be free of self-injurious ideation, impulses and acts at the time of discharge  Outcome: Not Progressing  Goal: Verbalize thoughts and feelings  Description: Interventions:  - Assess and re-assess patient's lethality and potential for self-injury  - Engage patient in 1:1 interactions, daily, for a minimum of 15 minutes  - Encourage patient to express feelings, fears, frustrations, hopes  - Establish rapport/trust with patient   Outcome: Not Progressing  Goal: Refrain from harming self  Description: Interventions:  - Monitor patient closely, per order  - Develop a trusting relationship  - Supervise medication ingestion, monitor effects and side effects   Outcome: Not Progressing  Goal: Attend and participate in unit activities, including therapeutic, recreational, and educational groups  Description: Interventions:  - Provide therapeutic and educational activities daily, encourage attendance and participation, and document same in the medical record  - Obtain collateral information, encourage visitation and family involvement in care   Outcome: Not Progressing  Goal: Recognize maladaptive responses and adopt new coping mechanisms  Outcome: Not Progressing  Goal: Complete daily ADLs, including personal hygiene independently, as able  Description: Interventions:  - Observe, teach, and assist patient with ADLS  - Monitor and promote a balance of rest/activity, with adequate nutrition and elimination  Outcome: Not Progressing     Problem: Depression  Goal: Treatment Goal: Demonstrate behavioral control of depressive symptoms, verbalize feelings of improved mood/affect, and adopt new coping skills prior to discharge  Outcome: Not Progressing  Goal: Verbalize thoughts and feelings  Description: Interventions:  - Assess and re-assess patient's level of risk   - Engage patient in 1:1 interactions, daily, for a minimum of 15 minutes   - Encourage patient to express feelings, fears, frustrations, hopes   Outcome: Not Progressing  Goal: Refrain from harming self  Description: Interventions:  - Monitor patient closely, per order   - Supervise medication ingestion, monitor effects and side effects   Outcome: Not Progressing  Goal: Refrain from isolation  Description: Interventions:  - Develop a trusting relationship   - Encourage socialization   Outcome: Not Progressing  Goal: Refrain from self-neglect  Outcome: Not Progressing  Goal: Attend and participate in unit activities, including therapeutic, recreational, and educational groups  Description: Interventions:  - Provide therapeutic and educational activities daily, encourage attendance and participation, and document same in the medical record   Outcome: Not Progressing  Goal: Complete daily ADLs, including personal hygiene independently, as able  Description: Interventions:  - Observe, teach, and assist patient with ADLS  -  Monitor and promote a balance of rest/activity, with adequate nutrition and elimination   Outcome: Not Progressing     Problem: Anxiety  Goal: Anxiety is at manageable level  Description: Interventions:  - Assess and monitor patient's anxiety level  - Monitor for signs and symptoms (heart palpitations, chest pain, shortness of breath, headaches, nausea, feeling jumpy, restlessness, irritable, apprehensive)  - Collaborate with interdisciplinary team and initiate plan and interventions as ordered    - [place] patient to unit/surroundings  - Explain treatment plan  - Encourage participation in care  - Encourage verbalization of concerns/fears  - Identify coping mechanisms  - Assist in developing anxiety-reducing skills  - Administer/offer alternative therapies  - Limit or eliminate stimulants  Outcome: Not Progressing     Problem: DEPRESSION  Goal: Will be euthymic at discharge  Description: INTERVENTIONS:  - Administer medication as ordered  - Provide emotional support via 1:1 interaction with staff  - Encourage involvement in milieu/groups/activities  - Monitor for social isolation  Outcome: Not Progressing     Problem: BEHAVIOR  Goal: Pt/Family maintain appropriate behavior and adhere to behavioral management agreement, if implemented  Description: INTERVENTIONS:  - Assess the family dynamic   - Encourage verbalization of thoughts and concerns in a socially appropriate manner  - Assess patient/family's coping skills and non-compliant behavior (including use of illegal substances)  - Utilize positive, consistent limit setting strategies supporting safety of patient, staff and others  - Initiate consult with Case Management, Spiritual Care or other ancillary services as appropriate  - If a patient's/visitor's behavior jeopardizes the safety of the patient, staff, or others, refer to organization procedure     - Notify Security of behavior or suspected illegal substances which indicate the need for search of the patient and/or belongings  - Encourage participation in the decision making process about a behavioral management agreement; implement if patient meets criteria  Outcome: Not Progressing

## 2022-12-01 NOTE — DISCHARGE INSTR - OTHER ORDERS
You are being transferred to [facility] located at  for ECT (electroconvulsive therapy Unit 3P) treatment  Your accepting provider is Dr Mariam Malave MD  Phone: [phone]  Triggers you have identified during your hospitalization that led to your admission of a distressed mood include potential substance use and unstable mental health  Coping skills you have identified during your hospitalization include reading, meditation, music, prayer  If you are unable to deal with your distressed mood alone please contact you Bisi Sequeira () [phone], Jordan Last () [phone], or Alden Anderson (Daughter) [phone]  If that is not effective and you continue to have a distressed mood, are overwhelmed, or in crisis, please contact (Crisis #) [facility] [phone], dial 911 or go to the nearest emergency center  [address] [place] [address] [facility] Crisis Hotline: [address] Crisis Hotline: [address] [facility] [employer] Suicide Prevention Lifeline:  0-912.731.6951  *Alcohol Anonymous: 945.267.2010  *[place] [employer] & Alcohol Commission: (920) 373-3534  [address] [facility]  on [address] [facility] ([facility] HELPLINE: 681.899.9262/Website: www RRsat  *Substance Abuse and [address]) [employer], which is a confidential, free, 24-hour-a-day, 365-day-a-year, information service for individuals and family members facing mental health and/or substance use disorders  This service provides referrals to local treatment facilities, support groups, and community-based organizations  Callers can also order free publications and other information    Call 9-411.977.3886/Website: www [facility] gov  *[facility] 2-1-1: This is a toll free, confidential, 24-hour-a-day service which connects you to a community  in your area who can help you find services and resources that are available to you locally and provide critical services that can improve and save lives  Call: 211  /Website: https://[employer]/     Please follow up with your [facility] ACT team at [phone]  You declined a follow up primary care provider appointment at this time  Maria Teresa Christian or Mayi, our Rossy and Vikas, will be calling you after your discharge, on the phone number that you provided  They will be available as an additional support, if needed  If you wish to speak with one of them, you may contact Marychuy De La Cruz at [phone] or Patrick Dunaway at [phone]    You are being discharged to

## 2022-12-01 NOTE — PLAN OF CARE
Problem: Ineffective Coping  Goal: Participates in unit activities  Description: Interventions:  - Provide therapeutic environment   - Provide required programming   - Redirect inappropriate behaviors   Outcome: Progressing   PT attends all art therapy groups offered and actively engages in group directives and discussions  PT is pleasant and cooperative and easily shares with peers and staff

## 2022-12-01 NOTE — NURSING NOTE
Patient given PRN Haldol 5mg for severe agitation r/t AH  Patient was yelling out in room   Pt  Reports Haldol was effective

## 2022-12-01 NOTE — PROGRESS NOTES
12/01/22 1155   Activity/Group Checklist   Group Admission/Discharge   Attendance Attended   Attendance Duration (min) 16-30   Interactions Interacted appropriately   Affect/Mood Appropriate   Goals Achieved Identified feelings; Identified triggers; Identified relapse prevention strategies; Discussed coping strategies; Identified resources and support systems; Able to listen to others; Able to engage in interactions; Able to reflect/comment on own behavior;Able to self-disclose; Able to recieve feedback   Patient agreeable to meeting with RT and completing her self assessment and relapse prevention plan  Patient is here in hospital due to [facility], confusion, and SI  She she continues to have a fear of being hypnotized  She shared likes of reading, music, watching TV, mediation and shopping  She is hopeful at discharge she will be able to stay calm, communicate healthier and maintain her own diet plan  Patient was calm and cooperative with RT, made eye contact during conversation and asked appropriate questions
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
## 2022-12-01 NOTE — PROGRESS NOTES
12/01/22 0856   Referral Data   Referral Reason [id number]   Readmission Root Cause   30 Day Readmission Yes   Who directed you to return to the hospital? Self   Did you understand whom to contact if you had questions or problems? Yes   Did you get your prescriptions before you left the hospital? Yes   Were you able to get your prescriptions filled when you left the hospital? Yes   Did you take your medications as prescribed? Yes   Were you able to get to your follow-up appointments? Yes   Patient was readmitted due to feeling like medication was not correct   Action Plan nursing, provider, sw, pt, pt's ACT team will work to Veda Maldonado [facility] patient for return to community   Patient Information   Mental Status Alert   Primary Caregiver Self   Support System Immediate family   Jewish/Cultural Requests none on unit   Legal Information   Current Status: 201   Legal Issues none reported   Activities of Daily Living Prior to Admission   Functional Status Independent   Assistive Device No device needed   Living Arrangement Other (Comment)  (Group Home)   Ambulation Independent   Access to Firearms   Access to Firearms No   Income Information   Income Source [employer]   Means of Transport to Appts:    [facility])

## 2022-12-01 NOTE — PROGRESS NOTES
12/01/22 0730   Activity/Group Checklist   Group   (Morning Community Meeting/Daily Check-In)   Attendance Attended   Attendance Duration (min) 46-60   Interactions Interacted appropriately   Affect/Mood Appropriate;Calm   Goals Achieved Identified feelings; Discussed coping strategies; Able to listen to others; Able to engage in interactions; Able to reflect/comment on own behavior;Able to manage/cope with feelings

## 2022-12-01 NOTE — NURSING NOTE
Patient isolative to room new admit prior to my shift  Patient drowsy but answering questions appropriately  Patient scant with conversation but med compliant  Patient declined snack stated she is tired  Patient had to be woken twice for medications  Currently denies SI HI AVH  And anxiety  Endorsed depression would not rate  Q 7 min safety checks maintained

## 2022-12-01 NOTE — SOCIAL WORK
[place] called [facility] ACT Team members [phone] regarding pts admission; VM left  Pt declined to sign DEYA for family, friends

## 2022-12-01 NOTE — PLAN OF CARE
Problem: DISCHARGE PLANNING - CARE MANAGEMENT  Goal: Discharge to post-acute care or home with appropriate resources  Description: INTERVENTIONS:  - Conduct assessment to determine patient/family and health care team treatment goals, and need for post-acute services based on payer coverage, community resources, and patient preferences, and barriers to discharge  - Address psychosocial, clinical, and financial barriers to discharge as identified in assessment in conjunction with the patient/family and health care team  - Arrange appropriate level of post-acute services according to patient’s   needs and preference and payer coverage in collaboration with the physician and health care team  - Communicate with and update the patient/family, physician, and health care team regarding progress on the discharge plan  - Arrange appropriate transportation to post-acute venues  Outcome: Progressing    Pt new 201, pt progressing, no dc date

## 2022-12-01 NOTE — NURSING NOTE
Patient visible and cooperative on unit  Denies SI/HI  Reports increased and anxiety r/t AH  PRN haldol administered 2x through out shift  Reports good effectiveness  Pleasant and cooperative during interaction  Preoccupied with medication and timing of same  Education provided and encouraged to allow time to see if changes of medication are beneficial   Patient attending unit programming  Good appetite  Compliant with all medications  Patient denies SI/HI  Routine safety checks maintained  Staff availability reinforced

## 2022-12-02 LAB
ALBUMIN SERPL BCP-MCNC: 4.2 G/DL (ref 3.5–5)
ALP SERPL-CCNC: 67 U/L (ref 34–104)
ALT SERPL W P-5'-P-CCNC: 22 U/L (ref 7–52)
ANION GAP SERPL CALCULATED.3IONS-SCNC: 7 MMOL/L (ref 4–13)
AST SERPL W P-5'-P-CCNC: 22 U/L (ref 13–39)
ATRIAL RATE: 88 BPM
BASOPHILS # BLD AUTO: 0.04 THOUSANDS/ÂΜL (ref 0–0.1)
BASOPHILS NFR BLD AUTO: 1 % (ref 0–1)
BILIRUB SERPL-MCNC: 0.23 MG/DL (ref 0.2–1)
BNP SERPL-MCNC: 8 PG/ML (ref 0–100)
BUN SERPL-MCNC: 8 MG/DL (ref 5–25)
CALCIUM SERPL-MCNC: 9.3 MG/DL (ref 8.4–10.2)
CHLORIDE SERPL-SCNC: 104 MMOL/L (ref 96–108)
CHOLEST SERPL-MCNC: 200 MG/DL
CO2 SERPL-SCNC: 27 MMOL/L (ref 21–32)
CREAT SERPL-MCNC: 0.66 MG/DL (ref 0.6–1.3)
CRP SERPL QL: 14 MG/L
EOSINOPHIL # BLD AUTO: 0.42 THOUSAND/ÂΜL (ref 0–0.61)
EOSINOPHIL NFR BLD AUTO: 5 % (ref 0–6)
ERYTHROCYTE [DISTWIDTH] IN BLOOD BY AUTOMATED COUNT: 15.1 % (ref 11.6–15.1)
GFR SERPL CREATININE-BSD FRML MDRD: 102 ML/MIN/1.73SQ M
GLUCOSE P FAST SERPL-MCNC: 95 MG/DL (ref 65–99)
GLUCOSE SERPL-MCNC: 95 MG/DL (ref 65–140)
HCT VFR BLD AUTO: 42.5 % (ref 34.8–46.1)
HDLC SERPL-MCNC: 51 MG/DL
HGB BLD-MCNC: 13.4 G/DL (ref 11.5–15.4)
IMM GRANULOCYTES # BLD AUTO: 0.04 THOUSAND/UL (ref 0–0.2)
IMM GRANULOCYTES NFR BLD AUTO: 1 % (ref 0–2)
LDLC SERPL CALC-MCNC: 124 MG/DL (ref 0–100)
LYMPHOCYTES # BLD AUTO: 1.65 THOUSANDS/ÂΜL (ref 0.6–4.47)
LYMPHOCYTES NFR BLD AUTO: 19 % (ref 14–44)
MCH RBC QN AUTO: 28.6 PG (ref 26.8–34.3)
MCHC RBC AUTO-ENTMCNC: 31.5 G/DL (ref 31.4–37.4)
MCV RBC AUTO: 91 FL (ref 82–98)
MONOCYTES # BLD AUTO: 0.75 THOUSAND/ÂΜL (ref 0.17–1.22)
MONOCYTES NFR BLD AUTO: 9 % (ref 4–12)
NEUTROPHILS # BLD AUTO: 5.91 THOUSANDS/ÂΜL (ref 1.85–7.62)
NEUTS SEG NFR BLD AUTO: 65 % (ref 43–75)
NONHDLC SERPL-MCNC: 149 MG/DL
NRBC BLD AUTO-RTO: 0 /100 WBCS
P AXIS: 60 DEGREES
PLATELET # BLD AUTO: 246 THOUSANDS/UL (ref 149–390)
PMV BLD AUTO: 9.4 FL (ref 8.9–12.7)
POTASSIUM SERPL-SCNC: 3.9 MMOL/L (ref 3.5–5.3)
PR INTERVAL: 158 MS
PROT SERPL-MCNC: 7 G/DL (ref 6.4–8.4)
QRS AXIS: 4 DEGREES
QRSD INTERVAL: 76 MS
QT INTERVAL: 406 MS
QTC INTERVAL: 491 MS
RBC # BLD AUTO: 4.68 MILLION/UL (ref 3.81–5.12)
SODIUM SERPL-SCNC: 138 MMOL/L (ref 135–147)
T WAVE AXIS: 55 DEGREES
TRIGL SERPL-MCNC: 127 MG/DL
TSH SERPL DL<=0.05 MIU/L-ACNC: 2.26 UIU/ML (ref 0.45–4.5)
VENTRICULAR RATE: 88 BPM
WBC # BLD AUTO: 8.81 THOUSAND/UL (ref 4.31–10.16)

## 2022-12-02 RX ORDER — OLANZAPINE 5 MG/1
5 TABLET ORAL EVERY 6 HOURS PRN
Status: DISCONTINUED | OUTPATIENT
Start: 2022-12-02 | End: 2022-12-05

## 2022-12-02 RX ORDER — OLANZAPINE 2.5 MG/1
2.5 TABLET ORAL EVERY 6 HOURS PRN
Status: DISCONTINUED | OUTPATIENT
Start: 2022-12-02 | End: 2022-12-07

## 2022-12-02 RX ORDER — METHOCARBAMOL 500 MG/1
500 TABLET, FILM COATED ORAL EVERY 6 HOURS PRN
Status: DISCONTINUED | OUTPATIENT
Start: 2022-12-02 | End: 2022-12-14 | Stop reason: HOSPADM

## 2022-12-02 RX ADMIN — FLUTICASONE PROPIONATE 1 SPRAY: 50 SPRAY, METERED NASAL at 17:35

## 2022-12-02 RX ADMIN — CLOZAPINE 200 MG: 100 TABLET ORAL at 20:54

## 2022-12-02 RX ADMIN — NICOTINE 1 PATCH: 21 PATCH, EXTENDED RELEASE TRANSDERMAL at 08:14

## 2022-12-02 RX ADMIN — LORAZEPAM 1 MG: 1 TABLET ORAL at 20:54

## 2022-12-02 RX ADMIN — LORAZEPAM 0.5 MG: 0.5 TABLET ORAL at 08:14

## 2022-12-02 RX ADMIN — PANTOPRAZOLE SODIUM 40 MG: 40 TABLET, DELAYED RELEASE ORAL at 05:33

## 2022-12-02 RX ADMIN — LORATADINE 10 MG: 10 TABLET ORAL at 08:14

## 2022-12-02 RX ADMIN — FLUTICASONE PROPIONATE 1 SPRAY: 50 SPRAY, METERED NASAL at 08:13

## 2022-12-02 RX ADMIN — FENOFIBRATE 145 MG: 145 TABLET, COATED ORAL at 08:14

## 2022-12-02 RX ADMIN — Medication 1000 UNITS: at 08:14

## 2022-12-02 RX ADMIN — HYDROCORTISONE: 25 CREAM TOPICAL at 15:04

## 2022-12-02 RX ADMIN — CLOZAPINE 50 MG: 25 TABLET ORAL at 08:13

## 2022-12-02 RX ADMIN — ACETAMINOPHEN 650 MG: 325 TABLET ORAL at 11:41

## 2022-12-02 RX ADMIN — VENLAFAXINE HYDROCHLORIDE 75 MG: 75 CAPSULE, EXTENDED RELEASE ORAL at 08:14

## 2022-12-02 NOTE — NURSING NOTE
Patient visible and cooperative on unit today  Cooperative and easily engaged today  Less outbursts than previous  During morning assessment patient denied SI/HI/AH/VH  Reports she was able to sleep during the night  During conversation later in day patient admits to feeling "down" a lot of times and states "I used to have a lot of interests, but I just don't anymore, I cant get into anything " Patient spends a lot of time walking halls  Eating meals  Currently denies concerns  Routine safety checks maintained  Staff availability reinforced

## 2022-12-02 NOTE — NURSING NOTE
Patient observed sleeping during 7 minute checks  No distress noted  Non labored breathing  Safety checks continue

## 2022-12-02 NOTE — PROGRESS NOTES
12/02/22   Team Meeting   Meeting Type Daily Rounds   Team Members Present   Team Members Present Physician;;Nurse   Physician Team Member Dr Esmer Cooley MD; HOSP DR RADHA SILVA, [address] [place] [address]   Nursing Team Member Maribel Anthony RN; Rufus Ugarte, RN   Social Work Team Member Diane Jensen [place]   Patient/Family Present   Patient Present No   Patient's Family Present No     AH, yelling, trazodone PRN, tapering ativan, paranoid

## 2022-12-02 NOTE — SOCIAL WORK
Pt called catina multiple times to discuss debit card situation, wanting to mail, email, fax paperwork related to debit card  Catina reminded pt of Thursday providers decision to not send these forms at this time  Pt requested to mail forms to   Catina spoke to provider who suggests pt follow up with Monday provider regarding this  Pt agreeable to plan  Support and reassurance provided

## 2022-12-02 NOTE — SOCIAL WORK
Sw attempted to reach pts [facility] (DEYA signed) [phone], [phone], [phone]; no vm is set up, no one answered, sw did not make contact  No other contacts were found after chart was reviewed  [place] called [facility] [phone], Shahrzad Krikland was notified of pts admission  ACT team may be interested in visiting pt on unit, will call to schedule  Call ended mutually

## 2022-12-02 NOTE — PROGRESS NOTES
Progress Note - Behavioral Health   Mariangel Hernandes 47 y o  female MRN: [id number]  Unit/Bed#: [facility] 247-01 Encounter: [id number]    Assessment/Plan   Principal Problem:    Schizoaffective disorder, bipolar type ([address] )      Behavior over the last 24 hours:  Some improvement  Sleep:  Improved  Appetite: normal  Medication side effects: No  ROS: no complaints and all other systems are negative    Carol Broderick was seen this morning for psychiatric follow-up  She was calm and cooperative  Mildly irritable, but redirectable  Reports improvement with sleep and feels her anxiety is also improving  Continues to endorse mild AH and denies VH  AH non commanding in nature and patient denies SI/HI; able to contract for safety  Reports “my anxiety is getting better, it's just my head is messed up ”  Reports PRN Haldol has not been effective for agitation and psychosis and requested either Risperdal or Zyprexa as p r n  Nakul Fragoso Patient agreeable to PRN Zyprexa since Melba Comes is worked pretty well for me in the past ”  Thoughts were linear with mild paranoia verbalized regarding missing debit card and housing, otherwise responded well to verbal redirection  Has been visible, social, and involved in unit activities      Mental Status Evaluation:  Appearance:  age appropriate and casually dressed   Behavior:  Cooperative   Speech:  normal pitch and normal volume   Mood:  irritable and Less anxious   Affect:  mood-congruent and redirectable   Thought Process:  circumstantial and goal directed   Associations: circumstantial associations   Thought Content:  Some paranoia   Perceptual Disturbances: Reports intermittent AH, non commanding in nature, denies VH   Risk Potential: Suicidal Ideations none  Homicidal Ideations none  Potential for Aggression No   Sensorium:  person, place, time/date and situation   Memory:  recent and remote memory grossly intact   Consciousness:  alert and awake    Attention: attention span and concentration were age appropriate   Insight:  limited   Judgment: limited   Gait/Station: normal gait/station and normal balance   Motor Activity: no abnormal movements     Progress Toward Goals:  Some improvement  Reports improving anxiety, mood, and sleep  Intermittent AH persist, although less intense  Clozaril restarted and HS dose increased to 200 mg last evening  Will continue with current psychotropic regimen and change PRN medications for mild-to-moderate agitation to Zyprexa  No discharge date at this time  Recommended Treatment: Continue with group therapy, milieu therapy and occupational therapy  Risks, benefits and possible side effects of Medications:   Risks, benefits, and possible side effects of medications explained to patient and patient verbalizes understanding  Medications:   Discontinue PRN Haldol for mild/moderate agitation    Start Zyprexa 2 5 mg p o  Q 6 hours PRN Mild agitation and Zyprexa 5 mg p o  Q 6 hours PRN Moderate agitation  all current active meds have been reviewed and current meds:   Current Facility-Administered Medications   Medication Dose Route Frequency   • acetaminophen (TYLENOL) tablet 650 mg  650 mg Oral Q6H PRN   • acetaminophen (TYLENOL) tablet 650 mg  650 mg Oral Q4H PRN   • acetaminophen (TYLENOL) tablet 975 mg  975 mg Oral Q6H PRN   • albuterol (PROVENTIL HFA,VENTOLIN HFA) inhaler 2 puff  2 puff Inhalation Q4H PRN   • aluminum-magnesium hydroxide-simethicone (MYLANTA) oral suspension 30 mL  30 mL Oral Q4H PRN   • haloperidol lactate (HALDOL) injection 5 mg  5 mg Intramuscular Q4H PRN Max 4/day    And   • LORazepam (ATIVAN) injection 2 mg  2 mg Intramuscular Q4H PRN Max 4/day    And   • benztropine (COGENTIN) injection 1 mg  1 mg Intramuscular Q4H PRN Max 4/day   • benztropine (COGENTIN) tablet 1 mg  1 mg Oral Q6H PRN   • cholecalciferol (VITAMIN D3) tablet 1,000 Units  1,000 Units Oral Daily   • cloZAPine (CLOZARIL) tablet 200 mg  200 mg Oral HS   • cloZAPine (CLOZARIL) tablet 50 mg  50 mg Oral Daily   • [START ON 12/3/2022] cloZAPine (CLOZARIL) tablet 50 mg  50 mg Oral After Dinner   • hydrOXYzine HCL (ATARAX) tablet 50 mg  50 mg Oral Q6H PRN Max 4/day    Or   • diphenhydrAMINE (BENADRYL) injection 50 mg  50 mg Intramuscular Q6H PRN   • fenofibrate (TRICOR) tablet 145 mg  145 mg Oral Daily   • fluticasone (FLONASE) 50 mcg/act nasal spray 1 spray  1 spray Each Nare BID   • hydrocortisone (ANUSOL-HC) 2 5 % rectal cream   Topical 4x Daily PRN   • hydrOXYzine HCL (ATARAX) tablet 100 mg  100 mg Oral Q6H PRN Max 4/day    Or   • LORazepam (ATIVAN) injection 2 mg  2 mg Intramuscular Q6H PRN   • hydrOXYzine HCL (ATARAX) tablet 25 mg  25 mg Oral Q6H PRN Max 4/day   • loratadine (CLARITIN) tablet 10 mg  10 mg Oral Daily   • LORazepam (ATIVAN) tablet 0 5 mg  0 5 mg Oral Q8H PRN   • LORazepam (ATIVAN) tablet 0 5 mg  0 5 mg Oral Daily   • LORazepam (ATIVAN) tablet 1 mg  1 mg Oral HS   • nicotine (NICODERM CQ) 21 mg/24 hr TD 24 hr patch 1 patch  1 patch Transdermal Daily   • OLANZapine (ZyPREXA) tablet 2 5 mg  2 5 mg Oral Q6H PRN   • OLANZapine (ZyPREXA) tablet 5 mg  5 mg Oral Q6H PRN   • pantoprazole (PROTONIX) EC tablet 40 mg  40 mg Oral Early Morning   • polyethylene glycol (MIRALAX) packet 17 g  17 g Oral Daily PRN   • traZODone (DESYREL) tablet 100 mg  100 mg Oral HS PRN   • traZODone (DESYREL) tablet 25 mg  25 mg Oral Q8H PRN   • venlafaxine (EFFEXOR-XR) 24 hr capsule 75 mg  75 mg Oral Daily     Labs: I have personally reviewed all pertinent laboratory/tests results     CBC:   Lab Results   Component Value Date    WBC 8 81 12/02/2022    RBC 4 68 12/02/2022    HGB 13 4 12/02/2022    HCT 42 5 12/02/2022    MCV 91 12/02/2022     12/02/2022    MCH 28 6 12/02/2022    MCHC 31 5 12/02/2022    RDW 15 1 12/02/2022    MPV 9 4 12/02/2022    NEUTROABS 5 91 12/02/2022     CMP:   Lab Results   Component Value Date    SODIUM 138 12/02/2022    K 3 9 12/02/2022     12/02/2022    CO2 27 12/02/2022    AGAP 7 12/02/2022    BUN 8 12/02/2022    CREATININE 0 66 12/02/2022    GLUC 95 12/02/2022    GLUF 95 12/02/2022    CALCIUM 9 3 12/02/2022    AST 22 12/02/2022    ALT 22 12/02/2022    ALKPHOS 67 12/02/2022    TP 7 0 12/02/2022    ALB 4 2 12/02/2022    TBILI 0 23 12/02/2022    EGFR 102 12/02/2022     Counseling / Coordination of Care  Total floor / unit time spent today 25 minutes  Greater than 50% of total time was spent with the patient and / or family counseling and / or coordination of care   A description of the counseling / coordination of care:  Medication education, treatment plan, supportive therapy

## 2022-12-02 NOTE — PROGRESS NOTES
12/02/22 0930   Activity/Group Checklist   Group   (Community Building Art Therapy)   Attendance Attended   Attendance Duration (min) 31-45   Interactions Interacted appropriately   Affect/Mood Appropriate   Goals Achieved Identified feelings; Discussed coping strategies; Identified resources and support systems; Able to listen to others; Able to engage in interactions; Able to reflect/comment on own behavior;Able to manage/cope with feelings

## 2022-12-02 NOTE — PROGRESS NOTES
12/02/22 0730   Activity/Group Checklist   Group   (Morning Community Meeting/Daily Check-In)   Attendance Attended   Attendance Duration (min) 46-60   Interactions Interacted appropriately   Affect/Mood Appropriate;Calm   Goals Achieved Identified feelings; Discussed coping strategies; Identified resources and support systems; Able to engage in interactions; Able to listen to others; Able to reflect/comment on own behavior;Able to manage/cope with feelings

## 2022-12-02 NOTE — NURSING NOTE
Patient visible on unit  Patient calm and cooperative  Patient social with roommate and select peers  Denies SI,HI,AVH, and depression  Does endorse mild anxiety  Safety checks continue Q 7 minutes

## 2022-12-03 LAB
EST. AVERAGE GLUCOSE BLD GHB EST-MCNC: 114 MG/DL
HBA1C MFR BLD: 5.6 %

## 2022-12-03 RX ADMIN — VENLAFAXINE HYDROCHLORIDE 75 MG: 75 CAPSULE, EXTENDED RELEASE ORAL at 08:40

## 2022-12-03 RX ADMIN — OLANZAPINE 5 MG: 5 TABLET, FILM COATED ORAL at 16:06

## 2022-12-03 RX ADMIN — CLOZAPINE 50 MG: 25 TABLET ORAL at 17:10

## 2022-12-03 RX ADMIN — NICOTINE 1 PATCH: 21 PATCH, EXTENDED RELEASE TRANSDERMAL at 09:00

## 2022-12-03 RX ADMIN — Medication 1000 UNITS: at 08:40

## 2022-12-03 RX ADMIN — ACETAMINOPHEN 975 MG: 325 TABLET ORAL at 21:41

## 2022-12-03 RX ADMIN — CLOZAPINE 50 MG: 25 TABLET ORAL at 08:40

## 2022-12-03 RX ADMIN — BENZTROPINE MESYLATE 1 MG: 1 TABLET ORAL at 21:41

## 2022-12-03 RX ADMIN — LORATADINE 10 MG: 10 TABLET ORAL at 08:40

## 2022-12-03 RX ADMIN — FENOFIBRATE 145 MG: 145 TABLET, COATED ORAL at 08:40

## 2022-12-03 RX ADMIN — CLOZAPINE 200 MG: 100 TABLET ORAL at 19:57

## 2022-12-03 RX ADMIN — OLANZAPINE 5 MG: 5 TABLET, FILM COATED ORAL at 11:53

## 2022-12-03 RX ADMIN — LORAZEPAM 0.5 MG: 0.5 TABLET ORAL at 08:40

## 2022-12-03 RX ADMIN — METHOCARBAMOL 500 MG: 500 TABLET ORAL at 19:43

## 2022-12-03 RX ADMIN — PANTOPRAZOLE SODIUM 40 MG: 40 TABLET, DELAYED RELEASE ORAL at 06:16

## 2022-12-03 RX ADMIN — FLUTICASONE PROPIONATE 1 SPRAY: 50 SPRAY, METERED NASAL at 17:11

## 2022-12-03 RX ADMIN — BENZTROPINE MESYLATE 1 MG: 1 TABLET ORAL at 16:06

## 2022-12-03 RX ADMIN — TRAZODONE HYDROCHLORIDE 100 MG: 100 TABLET ORAL at 19:58

## 2022-12-03 RX ADMIN — LORAZEPAM 1 MG: 1 TABLET ORAL at 19:57

## 2022-12-03 NOTE — PROGRESS NOTES
Psychiatric Progress Note - Department of [address] [facility] [address] Adonis 47 y o  female MRN: [id number]  Unit/Bed#: [facility] 247-01 Encounter: [id number]    ASSESSMENT & PLAN     Principal Problem:    Schizoaffective disorder, bipolar type ([facility] [address] )      • Continue to promote patient participation in therapeutic milieu  • Continue medical management per medicine  • Continue previously prescribed psychotropic medication regimen; see below  • Continue behavioral health checks q 7 minutes  • Continue vitals per behavioral health unit protocol  • Discharge date per primary team     SUBJECTIVE     Patient evaluated this hilda gotti  for continuity of care  Patient was discussed at length with nursing and treatment team   Per nursing, patient remains calm, cooperative, although isolative at times the milieu, adherent to her medication regimen in the absence of any acute adverse effects No acute distress is noted throughout evaluation, although admits to intermittent lightheadedness, receptive to psychoeducation provided by this writer  Ana Lilia Hu denies suicidal/homicidal ideation; contracts for safety  She admits to sad/depressed and anxious mood, although is unwilling to elaborate, admitting to intermittent instances of auditory hallucination, admitting to appropriate appetite and sleep overnight  PSYCHIATRIC REVIEW OF SYSTEMS     Behavior over the last 24 hours:  unchanged  Sleep: normal  Appetite: normal  Medication side effects: No    REVIEW OF SYSTEMS   Review of systems: no complaints    OBJECTIVE     Vital signs in last 24 hours:  Temp:  [97 3 °F (36 3 °C)-98 1 °F (36 7 °C)] 97 3 °F (36 3 °C)  HR:  [85-96] 85  Resp:  [16] 16  BP: (107-113)/(67-79) 107/67    Laboratory results:    I have personally reviewed all pertinent laboratory/tests results    Most Recent Labs:   Lab Results   Component Value Date    WBC 8 81 12/02/2022    RBC 4 68 12/02/2022    HGB 13 4 12/02/2022    HCT 42 5 12/02/2022     12/02/2022 RDW 15 1 12/02/2022    NEUTROABS 5 91 12/02/2022    SODIUM 138 12/02/2022    K 3 9 12/02/2022     12/02/2022    CO2 27 12/02/2022    BUN 8 12/02/2022    CREATININE 0 66 12/02/2022    GLUC 95 12/02/2022    GLUF 95 12/02/2022    CALCIUM 9 3 12/02/2022    AST 22 12/02/2022    ALT 22 12/02/2022    ALKPHOS 67 12/02/2022    TP 7 0 12/02/2022    ALB 4 2 12/02/2022    TBILI 0 23 12/02/2022    CHOLESTEROL 200 (H) 12/02/2022    HDL 51 12/02/2022    TRIG 127 12/02/2022    LDLCALC 124 (H) 12/02/2022    NONHDLC 149 12/02/2022    AMMONIA 28 10/27/2017    [id number] 2 264 12/02/2022    FREET4 0 89 03/24/2016    PREGUR Negative 11/07/2022    PREGSERUM Negative 10/21/2019    HCG <2 00 04/10/2014    RPR Non-Reactive 11/09/2022    HGBA1C 5 6 12/02/2022     12/02/2022     Labs in last 72 hours:   Recent Labs     12/02/22  0546   WBC 8 81   RBC 4 68   HGB 13 4   HCT 42 5      RDW 15 1   NEUTROABS 5 91   SODIUM 138   K 3 9      CO2 27   BUN 8   CREATININE 0 66   GLUC 95   GLUF 95   CALCIUM 9 3   AST 22   ALT 22   ALKPHOS 67   TP 7 0   ALB 4 2   TBILI 0 23   CHOLESTEROL 200*   HDL 51   TRIG 127   LDLCALC 124*   [id number] 2 264     Admission Labs:   Admission on 11/30/2022   Component Date Value   • Sodium 12/02/2022 138    • Potassium 12/02/2022 3 9    • Chloride 12/02/2022 104    • CO2 12/02/2022 27    • ANION GAP 12/02/2022 7    • BUN 12/02/2022 8    • Creatinine 12/02/2022 0 66    • Glucose 12/02/2022 95    • Glucose, Fasting 12/02/2022 95    • Calcium 12/02/2022 9 3    • AST 12/02/2022 22    • ALT 12/02/2022 22    • Alkaline Phosphatase 12/02/2022 67    • Total Protein 12/02/2022 7 0    • Albumin 12/02/2022 4 2    • Total Bilirubin 12/02/2022 0 23    • eGFR 12/02/2022 102    • WBC 12/02/2022 8 81    • RBC 12/02/2022 4 68    • Hemoglobin 12/02/2022 13 4    • Hematocrit 12/02/2022 42 5    • MCV 12/02/2022 91    • MCH 12/02/2022 28 6    • MCHC 12/02/2022 31 5    • RDW 12/02/2022 15 1    • MPV 12/02/2022 9  4    • Platelets 17/70/0830 246    • nRBC 12/02/2022 0    • Neutrophils Relative 12/02/2022 65    • Immat GRANS % 12/02/2022 1    • Lymphocytes Relative 12/02/2022 19    • Monocytes Relative 12/02/2022 9    • Eosinophils Relative 12/02/2022 5    • Basophils Relative 12/02/2022 1    • Neutrophils Absolute 12/02/2022 5 91    • Immature Grans Absolute 12/02/2022 0 04    • Lymphocytes Absolute 12/02/2022 1 65    • Monocytes Absolute 12/02/2022 0 75    • Eosinophils Absolute 12/02/2022 0 42    • Basophils Absolute 12/02/2022 0 04    • TSH 3RD GENERATON 12/02/2022 2 264    • Cholesterol 12/02/2022 200 (H)    • Triglycerides 12/02/2022 127    • HDL, Direct 12/02/2022 51    • LDL Calculated 12/02/2022 124 (H)    • Non-HDL-Chol (CHOL-HDL) 12/02/2022 149    • Hemoglobin A1C 12/02/2022 5 6    • EAG 12/02/2022 114    • CRP 12/02/2022 14 0 (H)    • BNP 12/02/2022 8    • Ventricular Rate 12/01/2022 88    • Atrial Rate 12/01/2022 88    • NE Interval 12/01/2022 158    • QRSD Interval 12/01/2022 76    • QT Interval 12/01/2022 406    • QTC Interval 12/01/2022 491    • P Axis 12/01/2022 60    • QRS Axis 12/01/2022 4    • T Wave Axis 12/01/2022 55        Behavioral Health Medications:   all current active meds have been reviewed, continue current psychiatric medications and current meds:   Current Facility-Administered Medications   Medication Dose Route Frequency   • acetaminophen (TYLENOL) tablet 650 mg  650 mg Oral Q6H PRN   • acetaminophen (TYLENOL) tablet 650 mg  650 mg Oral Q4H PRN   • acetaminophen (TYLENOL) tablet 975 mg  975 mg Oral Q6H PRN   • albuterol (PROVENTIL HFA,VENTOLIN HFA) inhaler 2 puff  2 puff Inhalation Q4H PRN   • aluminum-magnesium hydroxide-simethicone (MYLANTA) oral suspension 30 mL  30 mL Oral Q4H PRN   • haloperidol lactate (HALDOL) injection 5 mg  5 mg Intramuscular Q4H PRN Max 4/day    And   • LORazepam (ATIVAN) injection 2 mg  2 mg Intramuscular Q4H PRN Max 4/day    And   • benztropine (COGENTIN) injection 1 mg  1 mg Intramuscular Q4H PRN Max 4/day   • benztropine (COGENTIN) tablet 1 mg  1 mg Oral Q6H PRN   • cholecalciferol (VITAMIN D3) tablet 1,000 Units  1,000 Units Oral Daily   • cloZAPine (CLOZARIL) tablet 200 mg  200 mg Oral HS   • cloZAPine (CLOZARIL) tablet 50 mg  50 mg Oral Daily   • cloZAPine (CLOZARIL) tablet 50 mg  50 mg Oral After Dinner   • hydrOXYzine HCL (ATARAX) tablet 50 mg  50 mg Oral Q6H PRN Max 4/day    Or   • diphenhydrAMINE (BENADRYL) injection 50 mg  50 mg Intramuscular Q6H PRN   • fenofibrate (TRICOR) tablet 145 mg  145 mg Oral Daily   • fluticasone (FLONASE) 50 mcg/act nasal spray 1 spray  1 spray Each Nare BID   • hydrocortisone (ANUSOL-HC) 2 5 % rectal cream   Topical 4x Daily PRN   • hydrocortisone 2 5 % cream   Topical 4x Daily PRN   • hydrOXYzine HCL (ATARAX) tablet 100 mg  100 mg Oral Q6H PRN Max 4/day    Or   • LORazepam (ATIVAN) injection 2 mg  2 mg Intramuscular Q6H PRN   • hydrOXYzine HCL (ATARAX) tablet 25 mg  25 mg Oral Q6H PRN Max 4/day   • loratadine (CLARITIN) tablet 10 mg  10 mg Oral Daily   • LORazepam (ATIVAN) tablet 0 5 mg  0 5 mg Oral Q8H PRN   • LORazepam (ATIVAN) tablet 0 5 mg  0 5 mg Oral Daily   • LORazepam (ATIVAN) tablet 1 mg  1 mg Oral HS   • methocarbamol (ROBAXIN) tablet 500 mg  500 mg Oral Q6H PRN   • nicotine (NICODERM CQ) 21 mg/24 hr TD 24 hr patch 1 patch  1 patch Transdermal Daily   • OLANZapine (ZyPREXA) tablet 2 5 mg  2 5 mg Oral Q6H PRN   • OLANZapine (ZyPREXA) tablet 5 mg  5 mg Oral Q6H PRN   • pantoprazole (PROTONIX) EC tablet 40 mg  40 mg Oral Early Morning   • polyethylene glycol (MIRALAX) packet 17 g  17 g Oral Daily PRN   • traZODone (DESYREL) tablet 100 mg  100 mg Oral HS PRN   • traZODone (DESYREL) tablet 25 mg  25 mg Oral Q8H PRN   • venlafaxine (EFFEXOR-XR) 24 hr capsule 75 mg  75 mg Oral Daily         Current Facility-Administered Medications   Medication Dose Route Frequency Provider Last Rate   • acetaminophen  650 mg Oral Q6H PRN JARED Trevino     • acetaminophen  650 mg Oral Q4H PRN [facility] JARED CASE     • acetaminophen  975 mg Oral Q6H PRN [facility] JARED CASE     • albuterol  2 puff Inhalation Q4H PRN [facility] JARED CASE     • aluminum-magnesium hydroxide-simethicone  30 mL Oral Q4H PRN Kerwin Sleetmute Medozzy, CRNP     • haloperidol lactate  5 mg Intramuscular Q4H PRN Max 4/day Kerwin Sleetmute Medozzy, CRNP      And   • LORazepam  2 mg Intramuscular Q4H PRN Max 4/day [facility], CRKALLIE      And   • benztropine  1 mg Intramuscular Q4H PRN Max 4/day Kerwin Sleetmute Medozzy, CRNP     • benztropine  1 mg Oral Q6H PRN Zion Aguilar, CRNP     • cholecalciferol  1,000 Units Oral Daily JARED Trevino     • cloZAPine  200 mg Oral HS Amaury Oh MD     • cloZAPine  50 mg Oral Daily Gisselle Cardozo MD     • cloZAPine  50 mg Oral After Soren Resendiz MD     • hydrOXYzine HCL  50 mg Oral Q6H PRN Max 4/day Kerwin Sleetmute Medozzy, CRKALLIE      Or   • diphenhydrAMINE  50 mg Intramuscular Q6H PRN Kerwin Sleetmute Medozzy, CRNP     • fenofibrate  145 mg Oral Daily Kerwin Sleetmute Medozzy, CRNP     • fluticasone  1 spray Each Nare BID [facility] Yair, CRNP     • hydrocortisone   Topical 4x Daily PRN Payton Conrad PA-C     • hydrocortisone   Topical 4x Daily PRN Payton Conrad PA-C     • hydrOXYzine HCL  100 mg Oral Q6H PRN Max 4/day [facility] Yair, CRKALLIE      Or   • LORazepam  2 mg Intramuscular Q6H PRN Kerwin Sleetmute Medozzy, CRKALLIE     • hydrOXYzine HCL  25 mg Oral Q6H PRN Max 4/day [facility] Yair, CRNP     • loratadine  10 mg Oral Daily [facility] Yair, CRNP     • LORazepam  0 5 mg Oral Q8H PRN Amaury Oh MD     • LORazepam  0 5 mg Oral Daily Amaury Oh MD     • LORazepam  1 mg Oral HS Amaury Oh MD     • methocarbamol  500 mg Oral Q6H PRN Payton Conrad PA-C     • nicotine  1 patch Transdermal Daily JARED Sandoval     • OLANZapine  2 5 mg Oral Q6H PRN JARED Trevino     • OLANZapine  5 mg Oral Q6H PRN Dimple Petties Medei, CRNP     • pantoprazole  40 mg Oral Early Morning Dimple Petties Medei, CRNP     • polyethylene glycol  17 g Oral Daily PRN Dimple Petties Medei, CRNP     • traZODone  100 mg Oral HS PRN Dimple Petties Medei, CRNP     • traZODone  25 mg Oral Q8H PRN Naye Zimmerman MD     • venlafaxine  75 mg Oral Daily Dimple Petties Medozzy, CRNP         Risks, benefits and possible side effects of Medications:   Risks, benefits, and possible side effects of medications explained to patient and patient verbalizes understanding  Mental Status Evaluation:  Appearance:  age appropriate, casually dressed, disheveled and Marginal grooming/hygiene   Behavior:  Calm, cooperative, slightly suspicious possessing intermittent eye contact   Speech:  normal pitch and normal volume   Mood:  anxious, depressed and dysthymic   Affect:  constricted   Language naming objects and repeating phrases   Thought Process:  circumstantial   Thought Content:  no overt obsessions or delusions   Perceptual Disturbances: Intermittent auditory hallucinations, not appearing internally preoccupied or distracted, paranoid   Risk Potential: Suicidal Ideations none, Homicidal Ideations none and Potential for Aggression No   Sensorium:  person, place and situation   Cognition:  recent and remote memory grossly intact   Consciousness:  alert and awake    Attention: attention span appeared shorter than expected for age   Insight:  limited   Judgment: limited   Intellect    Gait/Station: normal gait/station and normal balance   Motor Activity: no abnormal movements     Memory: Short and long term memory  fair     Progress Toward Goals: unchanged, as evidenced by their participation (or lack thereof) in individual, social and therapeutic milieu in addition to adherence to their medication regimen  Recommended Treatment:   See above for assessment and plan      Inpatient Psychiatric Certification: Estimated length of stay: More than 2 midnights  Note Share: This note was not shared with the patient due to reasonable likelihood of causing patient harm     This note has been constructed using a voice recognition system  There may be translation, syntax,  or grammatical errors  If you have any questions, please contact the dictating provider      Michelleside C Holter, DO

## 2022-12-03 NOTE — NURSING NOTE
Patient medication compliant this shift  Patient took a shower this am  Patient did request a medication for her anxiety  Patient was given 5 mg Zyprexa PRN for agitation  Patient was visible on the unit  Patient denies SI, and HI

## 2022-12-03 NOTE — PROGRESS NOTES
Progress Note - Trevor Morales 47 y o  female MRN: [id number]    Unit/Bed#: [facility] 247-01 Encounter: [id number]        Subjective:   Patient seen and examined at bedside after reviewing the chart and discussing the case with the caring staff  Patient examined at bedside  Patient has no acute complaints  Physical Exam   Vitals: Blood pressure 107/67, pulse 85, temperature (!) 97 3 °F (36 3 °C), temperature source Temporal, resp  rate 16, height 5' 1" (1 549 m), weight 70 3 kg (155 lb), SpO2 96 %, not currently breastfeeding  ,Body mass index is 29 29 kg/m²  Constitutional: Patient in no acute distress  HEENT: PERR, EOMI, MMM  Cardiovascular: Normal rate and regular rhythm  Pulmonary/Chest: Effort normal and breath sounds normal    Abdomen: Non distended  Assessment/Plan:  Trevor Morales is a 47y o  year old female with schizoaffective disorder      1  GERD   Continue Protonix 40 mg daily, Mylanta as needed  2  Tobacco abuse   Patient is on nicotine transdermal patch 21 mg/24 hr   3  Hyperlipidemia   Continue fenofibrate 145 mg daily  4  Constipation   Patient may take MiraLax as needed  5  Asthma   Patient may use albuterol inhaler as needed      6  Allergic rhinitis  Patient is on Claritin 10 mg daily and Flonase nasal spray twice daily  7  Vitamin-D deficiency  Continue vitamin D3 1000 units daily     8  Hemorrhoids   Patient may use Anusol cream as needed  The patient was discussed with Dr Gail Shell and he is in agreement with the above note

## 2022-12-03 NOTE — NURSING NOTE
Patient withdrawn to room  Pleasant and cooperative  Denies SI, HI, hallucinations  Anxiety 6/10 depression 7/10  Compliant with  medications  Able to make needs known  Q 7 minute checks maintained  Will continue to monitor and access

## 2022-12-03 NOTE — PROGRESS NOTES
Progress Note - Derick Islas 47 y o  female MRN: [id number]    Unit/Bed#: [facility] 247-01 Encounter: [id number]        Subjective:   Patient seen and examined at bedside after reviewing the chart and discussing the case with the caring staff  Patient examined at bedside  Patient has no acute concerns  Physical Exam   Vitals: Blood pressure 113/79, pulse 96, temperature 98 1 °F (36 7 °C), temperature source Temporal, resp  rate 16, height 5' 1" (1 549 m), weight 69 9 kg (154 lb), SpO2 97 %, not currently breastfeeding  ,Body mass index is 29 1 kg/m²  Constitutional: Patient in no acute distress  HEENT: PERR, EOMI, MMM  Cardiovascular: Normal rate and regular rhythm  Pulmonary/Chest: Effort normal and breath sounds normal    Abdomen: Non distended  Assessment/Plan:  Derick Islas is a 47y o  year old female with schizoaffective disorder      1  GERD   Continue Protonix 40 mg daily, Mylanta as needed  2  Tobacco abuse   Patient is on nicotine transdermal patch 21 mg/24 hr   3  Hyperlipidemia   Continue fenofibrate 145 mg daily  4  Constipation   Patient may take MiraLax as needed  5  Asthma   Patient may use albuterol inhaler as needed      6  Allergic rhinitis  Patient is on Claritin 10 mg daily and Flonase nasal spray twice daily  7  Vitamin-D deficiency  Continue vitamin D3 1000 units daily     8  Hemorrhoids   Patient may use Anusol cream as needed  The patient was discussed with Dr Nallely Stevenson and he is in agreement with the above note

## 2022-12-03 NOTE — NURSING NOTE
Pt given 5mg zyprexa and 1mg cogentin for agitation scale of 29 and presenting loud, yelling, responding, and endorsing auditory hallucinations

## 2022-12-04 RX ORDER — DOCUSATE SODIUM 100 MG/1
100 CAPSULE, LIQUID FILLED ORAL 2 TIMES DAILY
Status: DISCONTINUED | OUTPATIENT
Start: 2022-12-04 | End: 2022-12-14 | Stop reason: HOSPADM

## 2022-12-04 RX ORDER — LORAZEPAM 1 MG/1
1 TABLET ORAL
Status: DISCONTINUED | OUTPATIENT
Start: 2022-12-04 | End: 2022-12-14 | Stop reason: HOSPADM

## 2022-12-04 RX ORDER — CLOZAPINE 100 MG/1
200 TABLET ORAL
Status: DISCONTINUED | OUTPATIENT
Start: 2022-12-04 | End: 2022-12-14 | Stop reason: HOSPADM

## 2022-12-04 RX ADMIN — NICOTINE 1 PATCH: 21 PATCH, EXTENDED RELEASE TRANSDERMAL at 08:34

## 2022-12-04 RX ADMIN — VENLAFAXINE HYDROCHLORIDE 75 MG: 75 CAPSULE, EXTENDED RELEASE ORAL at 08:32

## 2022-12-04 RX ADMIN — LORATADINE 10 MG: 10 TABLET ORAL at 08:32

## 2022-12-04 RX ADMIN — CLOZAPINE 200 MG: 100 TABLET ORAL at 20:12

## 2022-12-04 RX ADMIN — LORAZEPAM 0.5 MG: 0.5 TABLET ORAL at 08:32

## 2022-12-04 RX ADMIN — FLUTICASONE PROPIONATE 1 SPRAY: 50 SPRAY, METERED NASAL at 17:31

## 2022-12-04 RX ADMIN — DOCUSATE SODIUM 100 MG: 100 CAPSULE, LIQUID FILLED ORAL at 17:31

## 2022-12-04 RX ADMIN — DOCUSATE SODIUM 100 MG: 100 CAPSULE, LIQUID FILLED ORAL at 11:42

## 2022-12-04 RX ADMIN — FENOFIBRATE 145 MG: 145 TABLET, COATED ORAL at 08:32

## 2022-12-04 RX ADMIN — ACETAMINOPHEN 650 MG: 325 TABLET ORAL at 11:31

## 2022-12-04 RX ADMIN — Medication 1000 UNITS: at 08:32

## 2022-12-04 RX ADMIN — HYDROCORTISONE: 25 CREAM TOPICAL at 13:20

## 2022-12-04 RX ADMIN — CLOZAPINE 50 MG: 25 TABLET ORAL at 08:31

## 2022-12-04 RX ADMIN — LORAZEPAM 1 MG: 1 TABLET ORAL at 20:12

## 2022-12-04 RX ADMIN — CLOZAPINE 50 MG: 25 TABLET ORAL at 17:30

## 2022-12-04 RX ADMIN — LORAZEPAM 0.5 MG: 0.5 TABLET ORAL at 18:29

## 2022-12-04 RX ADMIN — FLUTICASONE PROPIONATE 1 SPRAY: 50 SPRAY, METERED NASAL at 08:35

## 2022-12-04 NOTE — NURSING NOTE
Patient started yelling in her room and cursing  Patient states "the bitch is after me and stalking me through the walls  Patient states its "bullshit that she can come through the walls at the [facility] house to get her  Patient calmed down and said she needs a Ativan to help with the voices  Per PRN order gave her 0 5 mg Ativan per PRN order

## 2022-12-04 NOTE — PROGRESS NOTES
Progress Note - Jonathan Mack 47 y o  female MRN: [id number]    Unit/Bed#: [facility] 247-01 Encounter: [id number]        Subjective:   Patient seen and examined at bedside after reviewing the chart and discussing the case with the caring staff  Patient examined at bedside  Patient requesting Colace instead of MiraLax or MOM  Last BM was yesterday  Physical Exam   Vitals: Blood pressure 105/68, pulse 72, temperature 98 °F (36 7 °C), temperature source Temporal, resp  rate 18, height 5' 1" (1 549 m), weight 70 3 kg (155 lb), SpO2 95 %, not currently breastfeeding  ,Body mass index is 29 29 kg/m²  Constitutional: Patient in no acute distress  HEENT: PERR, EOMI, MMM  Cardiovascular: Normal rate and regular rhythm  Pulmonary/Chest: Effort normal and breath sounds normal    Abdomen: +BS, soft, NT    Assessment/Plan:  Jonathan Mack is a 47y o  year old female with schizoaffective disorder      1  GERD   Continue Protonix 40 mg daily, Mylanta as needed  2  Tobacco abuse   Patient is on nicotine transdermal patch 21 mg/24 hr   3  Hyperlipidemia   Continue fenofibrate 145 mg daily  4  Constipation   Start Colace 100 mg twice daily  Patient may take MiraLax as needed  5  Asthma   Patient may use albuterol inhaler as needed      6  Allergic rhinitis  Patient is on Claritin 10 mg daily and Flonase nasal spray twice daily  7  Vitamin-D deficiency  Continue vitamin D3 1000 units daily     8  Hemorrhoids   Patient may use Anusol cream as needed  The patient was discussed with Dr Manuel Arnett and he is in agreement with the above note

## 2022-12-04 NOTE — NURSING NOTE
Patient appears flat, depressed, and tormented  Overheard screaming in her room multiple times  Denies SI/HI while treatment but reports that she has had both due to her hallucinations that she reports "won't stop, it's making me sick"  States she is is tired of "hypnosis", "sick sh*t" and refers to her AH as "reverse fans"  Pressured speech  Using multiple vulgarities  Patient requested HS medications immediately due to her overwhelming psychosis, which did seem to help with her distress  Patient provided with reassurance and comfort measures    Patient has continuous c/o restlessness and leg pain, medicated as needed  Will remain on safety precautions and continual monitoring

## 2022-12-04 NOTE — PLAN OF CARE
Problem: Alteration in Thoughts and Perception  Goal: Verbalize thoughts and feelings  Description: Interventions:  - Promote a nonjudgmental and trusting relationship with the patient through active listening and therapeutic communication  - Assess patient's level of functioning, behavior and potential for risk  - Engage patient in 1 on 1 interactions  - Encourage patient to express fears, feelings, frustrations, and discuss symptoms    - [place] patient to reality, help patient recognize reality-based thinking   - Administer medications as ordered and assess for potential side effects  - Provide the patient education related to the signs and symptoms of the illness and desired effects of prescribed medications  Outcome: Progressing     Problem: Ineffective Coping  Goal: Identifies healthy coping skills  Outcome: Progressing     Problem: Ineffective Coping  Goal: Demonstrates healthy coping skills  Outcome: Not Progressing     Problem: Anxiety  Goal: Anxiety is at manageable level  Description: Interventions:  - Assess and monitor patient's anxiety level  - Monitor for signs and symptoms (heart palpitations, chest pain, shortness of breath, headaches, nausea, feeling jumpy, restlessness, irritable, apprehensive)  - Collaborate with interdisciplinary team and initiate plan and interventions as ordered    - [place] patient to unit/surroundings  - Explain treatment plan  - Encourage participation in care  - Encourage verbalization of concerns/fears  - Identify coping mechanisms  - Assist in developing anxiety-reducing skills  - Administer/offer alternative therapies  - Limit or eliminate stimulants  Outcome: Not Progressing

## 2022-12-04 NOTE — PROGRESS NOTES
Psychiatric Progress Note - Department of [address] [facility] [address] Adonis 47 y o  female MRN: [id number]  Unit/Bed#: [facility] 247-01 Encounter: [id number]    ASSESSMENT & PLAN     Principal Problem:    Schizoaffective disorder, bipolar type ([facility] [address] )      • Continue to promote patient participation in therapeutic milieu  • Continue medical management per medicine  • Continue previously prescribed psychotropic medication regimen; see below  • Continue behavioral health checks q 7 minutes  • Continue vitals per behavioral health unit protocol  • Discharge date per primary team     SUBJECTIVE     Patient evaluated this a m  for continuity of care  Patient was discussed at length with nursing and treatment team   Per nursing, patient remains predominantly calm, cooperative, although responsive to internal stimuli at times, isolative at times in the milieu although adherent to her medication regimen in the absence of any acute adverse effects No acute distress is noted throughout evaluation  Macie Sahu denies suicidal/homicidal ideation; contracts for safety  She denies dysphoric mood including sad/depressed and anxious mood, although alludes to worsening psychosis throughout the night, admitting to intermittent instances of auditory hallucinations, appearing internally preoccupied and distracted, admitting to appropriate appetite, although admits to problematic sleep overnight related to said hallucinations  PSYCHIATRIC REVIEW OF SYSTEMS     Behavior over the last 24 hours:  unchanged  Sleep: insomnia  Appetite: normal  Medication side effects: No    REVIEW OF SYSTEMS   Review of systems: no complaints    OBJECTIVE     Vital signs in last 24 hours:  Temp:  [97 °F (36 1 °C)-98 5 °F (36 9 °C)] 98 °F (36 7 °C)  HR:  [72-87] 72  Resp:  [16-18] 18  BP: (105-118)/(68-84) 105/68    Laboratory results:    I have personally reviewed all pertinent laboratory/tests results    Most Recent Labs:   Lab Results   Component Value Date    WBC 8 81 12/02/2022    RBC 4 68 12/02/2022    HGB 13 4 12/02/2022    HCT 42 5 12/02/2022     12/02/2022    RDW 15 1 12/02/2022    NEUTROABS 5 91 12/02/2022    SODIUM 138 12/02/2022    K 3 9 12/02/2022     12/02/2022    CO2 27 12/02/2022    BUN 8 12/02/2022    CREATININE 0 66 12/02/2022    GLUC 95 12/02/2022    GLUF 95 12/02/2022    CALCIUM 9 3 12/02/2022    AST 22 12/02/2022    ALT 22 12/02/2022    ALKPHOS 67 12/02/2022    TP 7 0 12/02/2022    ALB 4 2 12/02/2022    TBILI 0 23 12/02/2022    CHOLESTEROL 200 (H) 12/02/2022    HDL 51 12/02/2022    TRIG 127 12/02/2022    LDLCALC 124 (H) 12/02/2022    NONHDLC 149 12/02/2022    AMMONIA 28 10/27/2017    [id number] 2 264 12/02/2022    FREET4 0 89 03/24/2016    PREGUR Negative 11/07/2022    PREGSERUM Negative 10/21/2019    HCG <2 00 04/10/2014    RPR Non-Reactive 11/09/2022    HGBA1C 5 6 12/02/2022     12/02/2022     Labs in last 72 hours:   Recent Labs     12/02/22  0546   WBC 8 81   RBC 4 68   HGB 13 4   HCT 42 5      RDW 15 1   NEUTROABS 5 91   SODIUM 138   K 3 9      CO2 27   BUN 8   CREATININE 0 66   GLUC 95   GLUF 95   CALCIUM 9 3   AST 22   ALT 22   ALKPHOS 67   TP 7 0   ALB 4 2   TBILI 0 23   CHOLESTEROL 200*   HDL 51   TRIG 127   LDLCALC 124*   [id number] 2 264     Admission Labs:   Admission on 11/30/2022   Component Date Value   • Sodium 12/02/2022 138    • Potassium 12/02/2022 3 9    • Chloride 12/02/2022 104    • CO2 12/02/2022 27    • ANION GAP 12/02/2022 7    • BUN 12/02/2022 8    • Creatinine 12/02/2022 0 66    • Glucose 12/02/2022 95    • Glucose, Fasting 12/02/2022 95    • Calcium 12/02/2022 9 3    • AST 12/02/2022 22    • ALT 12/02/2022 22    • Alkaline Phosphatase 12/02/2022 67    • Total Protein 12/02/2022 7 0    • Albumin 12/02/2022 4 2    • Total Bilirubin 12/02/2022 0 23    • eGFR 12/02/2022 102    • WBC 12/02/2022 8 81    • RBC 12/02/2022 4 68    • Hemoglobin 12/02/2022 13 4    • Hematocrit 12/02/2022 42 5 • MCV 12/02/2022 91    • MCH 12/02/2022 28 6    • MCHC 12/02/2022 31 5    • RDW 12/02/2022 15 1    • MPV 12/02/2022 9 4    • Platelets 78/78/2820 246    • nRBC 12/02/2022 0    • Neutrophils Relative 12/02/2022 65    • Immat GRANS % 12/02/2022 1    • Lymphocytes Relative 12/02/2022 19    • Monocytes Relative 12/02/2022 9    • Eosinophils Relative 12/02/2022 5    • Basophils Relative 12/02/2022 1    • Neutrophils Absolute 12/02/2022 5 91    • Immature Grans Absolute 12/02/2022 0 04    • Lymphocytes Absolute 12/02/2022 1 65    • Monocytes Absolute 12/02/2022 0 75    • Eosinophils Absolute 12/02/2022 0 42    • Basophils Absolute 12/02/2022 0 04    • TSH 3RD GENERATON 12/02/2022 2 264    • Cholesterol 12/02/2022 200 (H)    • Triglycerides 12/02/2022 127    • HDL, Direct 12/02/2022 51    • LDL Calculated 12/02/2022 124 (H)    • Non-HDL-Chol (CHOL-HDL) 12/02/2022 149    • Hemoglobin A1C 12/02/2022 5 6    • EAG 12/02/2022 114    • CRP 12/02/2022 14 0 (H)    • BNP 12/02/2022 8    • Ventricular Rate 12/01/2022 88    • Atrial Rate 12/01/2022 88    • ID Interval 12/01/2022 158    • QRSD Interval 12/01/2022 76    • QT Interval 12/01/2022 406    • QTC Interval 12/01/2022 491    • P Axis 12/01/2022 60    • QRS Axis 12/01/2022 4    • T Wave Axis 12/01/2022 55        Behavioral Health Medications:   all current active meds have been reviewed, continue current psychiatric medications and current meds:   Current Facility-Administered Medications   Medication Dose Route Frequency   • acetaminophen (TYLENOL) tablet 650 mg  650 mg Oral Q6H PRN   • acetaminophen (TYLENOL) tablet 650 mg  650 mg Oral Q4H PRN   • acetaminophen (TYLENOL) tablet 975 mg  975 mg Oral Q6H PRN   • albuterol (PROVENTIL HFA,VENTOLIN HFA) inhaler 2 puff  2 puff Inhalation Q4H PRN   • aluminum-magnesium hydroxide-simethicone (MYLANTA) oral suspension 30 mL  30 mL Oral Q4H PRN   • haloperidol lactate (HALDOL) injection 5 mg  5 mg Intramuscular Q4H PRN Max 4/day And   • LORazepam (ATIVAN) injection 2 mg  2 mg Intramuscular Q4H PRN Max 4/day    And   • benztropine (COGENTIN) injection 1 mg  1 mg Intramuscular Q4H PRN Max 4/day   • benztropine (COGENTIN) tablet 1 mg  1 mg Oral Q6H PRN   • cholecalciferol (VITAMIN D3) tablet 1,000 Units  1,000 Units Oral Daily   • cloZAPine (CLOZARIL) tablet 200 mg  200 mg Oral HS   • cloZAPine (CLOZARIL) tablet 50 mg  50 mg Oral Daily   • cloZAPine (CLOZARIL) tablet 50 mg  50 mg Oral After Dinner   • hydrOXYzine HCL (ATARAX) tablet 50 mg  50 mg Oral Q6H PRN Max 4/day    Or   • diphenhydrAMINE (BENADRYL) injection 50 mg  50 mg Intramuscular Q6H PRN   • fenofibrate (TRICOR) tablet 145 mg  145 mg Oral Daily   • fluticasone (FLONASE) 50 mcg/act nasal spray 1 spray  1 spray Each Nare BID   • hydrocortisone (ANUSOL-HC) 2 5 % rectal cream   Topical 4x Daily PRN   • hydrocortisone 2 5 % cream   Topical 4x Daily PRN   • hydrOXYzine HCL (ATARAX) tablet 100 mg  100 mg Oral Q6H PRN Max 4/day    Or   • LORazepam (ATIVAN) injection 2 mg  2 mg Intramuscular Q6H PRN   • hydrOXYzine HCL (ATARAX) tablet 25 mg  25 mg Oral Q6H PRN Max 4/day   • loratadine (CLARITIN) tablet 10 mg  10 mg Oral Daily   • LORazepam (ATIVAN) tablet 0 5 mg  0 5 mg Oral Q8H PRN   • LORazepam (ATIVAN) tablet 0 5 mg  0 5 mg Oral Daily   • LORazepam (ATIVAN) tablet 1 mg  1 mg Oral HS   • methocarbamol (ROBAXIN) tablet 500 mg  500 mg Oral Q6H PRN   • nicotine (NICODERM CQ) 21 mg/24 hr TD 24 hr patch 1 patch  1 patch Transdermal Daily   • OLANZapine (ZyPREXA) tablet 2 5 mg  2 5 mg Oral Q6H PRN   • OLANZapine (ZyPREXA) tablet 5 mg  5 mg Oral Q6H PRN   • pantoprazole (PROTONIX) EC tablet 40 mg  40 mg Oral Early Morning   • polyethylene glycol (MIRALAX) packet 17 g  17 g Oral Daily PRN   • traZODone (DESYREL) tablet 100 mg  100 mg Oral HS PRN   • traZODone (DESYREL) tablet 25 mg  25 mg Oral Q8H PRN   • venlafaxine (EFFEXOR-XR) 24 hr capsule 75 mg  75 mg Oral Daily         Current Facility-Administered Medications   Medication Dose Route Frequency Provider Last Rate   • acetaminophen  650 mg Oral Q6H PRN Helane Kennel Medei, CRNP     • acetaminophen  650 mg Oral Q4H PRN Helane Kennel [facility]     • acetaminophen  975 mg Oral Q6H PRN Helane Kennel Medei, CRNP     • albuterol  2 puff Inhalation Q4H PRN Helane Kennel Medei, CRNP     • aluminum-magnesium hydroxide-simethicone  30 mL Oral Q4H PRN Helane Kennel Medei, CRNP     • haloperidol lactate  5 mg Intramuscular Q4H PRN Max 4/day Helane Kennel Medei, CRNP      And   • LORazepam  2 mg Intramuscular Q4H PRN Max 4/day Helane Kennel Medei, CRNP      And   • benztropine  1 mg Intramuscular Q4H PRN Max 4/day Helane Kennel Medei, CRNP     • benztropine  1 mg Oral Q6H PRN Helane Kennel Medei, CRNP     • cholecalciferol  1,000 Units Oral Daily Helane Kennel Medei, CRNP     • cloZAPine  200 mg Oral HS Kev Rivas MD     • cloZAPine  50 mg Oral Daily Vandana Singh MD     • cloZAPine  50 mg Oral After Jordy Mujica MD     • hydrOXYzine HCL  50 mg Oral Q6H PRN Max 4/day Helane Kennel Medei, CRNP      Or   • diphenhydrAMINE  50 mg Intramuscular Q6H PRN Helane Kennel Medei, CRNP     • fenofibrate  145 mg Oral Daily [facility], CR[facility]     • fluticasone  1 spray Each Nare BID Helane Kennel Medei, CRNP     • hydrocortisone   Topical 4x Daily PRN Payton Conrad PA-C     • hydrocortisone   Topical 4x Daily PRN Payton Conrad PA-C     • hydrOXYzine HCL  100 mg Oral Q6H PRN Max 4/day Bri M Medei, CRKALLIE      Or   • LORazepam  2 mg Intramuscular Q6H PRN Helane Kennel Medei, CRNP     • hydrOXYzine HCL  25 mg Oral Q6H PRN Max 4/day Bri M Medei, CRNP     • loratadine  10 mg Oral Daily Helane Kennel Medei, CRNP     • LORazepam  0 5 mg Oral Q8H PRN Kev Rivas MD     • LORazepam  0 5 mg Oral Daily Kev Rivas MD     • LORazepam  1 mg Oral HS Kev Rivas MD     • methocarbamol  500 mg Oral Q6H PRN Payton Conrad PA-C     • nicotine  1 patch Transdermal Daily Maribel Mazariegos, JARED     • OLANZapine  2 5 mg Oral Q6H PRN JARED Hughes     • OLANZapine  5 mg Oral Q6H PRN Maribel Densonnel JARED Mazariegos     • pantoprazole  40 mg Oral Early Morning Maribel Mazariegos, RENATANP     • polyethylene glycol  17 g Oral Daily PRN Maribel Densonnel Yair, RENATANP     • traZODone  100 mg Oral HS PRN JARED Hughes     • traZODone  25 mg Oral Q8H PRN Kev Rivas MD     • venlafaxine  75 mg Oral Daily JARED Hughes         Risks, benefits and possible side effects of Medications:   Risks, benefits, and possible side effects of medications explained to patient and patient verbalizes understanding  Mental Status Evaluation:  Appearance:  age appropriate, casually dressed, disheveled and Marginal grooming/hygiene   Behavior:  Calm, cooperative possessing intermittent eye contact   Speech:  normal pitch and normal volume   Mood:  anxious and dysthymic   Affect:  constricted   Language naming objects and repeating phrases   Thought Process:  circumstantial   Thought Content:  no overt obsessions or delusions   Perceptual Disturbances: Intermittent auditory hallucinations, paring internally preoccupied and distracted   Risk Potential: Suicidal Ideations none, Homicidal Ideations none and Potential for Aggression No   Sensorium:  person, place and time/date   Cognition:  recent and remote memory grossly intact   Consciousness:  alert and awake    Attention: attention span appeared shorter than expected for age   Insight:  limited   Judgment: limited   Intellect    Gait/Station: normal gait/station and normal balance   Motor Activity: no abnormal movements     Memory: Short and long term memory  fair     Progress Toward Goals:  Unchanged, as evidenced by their participation (or lack thereof) in individual, social and therapeutic milieu in addition to adherence to their medication regimen      Recommended Treatment:   See above for assessment and plan     Inpatient Psychiatric Certification: Estimated length of stay: More than 2 midnights  Note Share: This note was not shared with the patient due to reasonable likelihood of causing patient harm     This note has been constructed using a voice recognition system  There may be translation, syntax,  or grammatical errors  If you have any questions, please contact the dictating provider      Gambia C Holter, DO

## 2022-12-04 NOTE — NURSING NOTE
Patient came to this nurse and asked for Tylenol for her bilateral feet and ankles  Patient believes its just arthritis  Gave patient 650mg Tylenol per PRN order

## 2022-12-04 NOTE — NURSING NOTE
Patient was visible on unit today and compliant with medications this shift  Patient denies all SI and HI this shift  Patient was in dinning room and used the phone today  Patient did take a shower  Patient pleasant and cooperative

## 2022-12-05 RX ORDER — TRAZODONE HYDROCHLORIDE 50 MG/1
25 TABLET ORAL EVERY 8 HOURS PRN
Status: DISCONTINUED | OUTPATIENT
Start: 2022-12-05 | End: 2022-12-14 | Stop reason: HOSPADM

## 2022-12-05 RX ORDER — HALOPERIDOL 5 MG/1
5 TABLET ORAL EVERY 6 HOURS PRN
Status: DISCONTINUED | OUTPATIENT
Start: 2022-12-05 | End: 2022-12-05

## 2022-12-05 RX ORDER — OLANZAPINE 5 MG/1
5 TABLET ORAL EVERY 6 HOURS PRN
Status: DISCONTINUED | OUTPATIENT
Start: 2022-12-05 | End: 2022-12-07

## 2022-12-05 RX ORDER — CLOZAPINE 25 MG/1
50 TABLET ORAL
Status: DISCONTINUED | OUTPATIENT
Start: 2022-12-05 | End: 2022-12-12

## 2022-12-05 RX ADMIN — VENLAFAXINE HYDROCHLORIDE 75 MG: 75 CAPSULE, EXTENDED RELEASE ORAL at 08:22

## 2022-12-05 RX ADMIN — CLOZAPINE 200 MG: 100 TABLET ORAL at 20:55

## 2022-12-05 RX ADMIN — CLOZAPINE 50 MG: 25 TABLET ORAL at 08:22

## 2022-12-05 RX ADMIN — DOCUSATE SODIUM 100 MG: 100 CAPSULE, LIQUID FILLED ORAL at 08:25

## 2022-12-05 RX ADMIN — BENZTROPINE MESYLATE 1 MG: 1 TABLET ORAL at 20:58

## 2022-12-05 RX ADMIN — CLOZAPINE 50 MG: 25 TABLET ORAL at 15:28

## 2022-12-05 RX ADMIN — LORAZEPAM 1 MG: 1 TABLET ORAL at 20:55

## 2022-12-05 RX ADMIN — OLANZAPINE 5 MG: 5 TABLET, FILM COATED ORAL at 15:30

## 2022-12-05 RX ADMIN — FENOFIBRATE 145 MG: 145 TABLET, COATED ORAL at 08:22

## 2022-12-05 RX ADMIN — LORAZEPAM 0.5 MG: 0.5 TABLET ORAL at 08:22

## 2022-12-05 RX ADMIN — PANTOPRAZOLE SODIUM 40 MG: 40 TABLET, DELAYED RELEASE ORAL at 08:22

## 2022-12-05 RX ADMIN — HYDROCORTISONE: 25 CREAM TOPICAL at 09:03

## 2022-12-05 RX ADMIN — FLUTICASONE PROPIONATE 1 SPRAY: 50 SPRAY, METERED NASAL at 08:25

## 2022-12-05 RX ADMIN — OLANZAPINE 2.5 MG: 2.5 TABLET, FILM COATED ORAL at 11:28

## 2022-12-05 RX ADMIN — LORATADINE 10 MG: 10 TABLET ORAL at 08:22

## 2022-12-05 RX ADMIN — Medication 1000 UNITS: at 08:25

## 2022-12-05 RX ADMIN — FLUTICASONE PROPIONATE 1 SPRAY: 50 SPRAY, METERED NASAL at 17:08

## 2022-12-05 RX ADMIN — POLYETHYLENE GLYCOL 3350 17 G: 17 POWDER, FOR SOLUTION ORAL at 16:34

## 2022-12-05 RX ADMIN — NICOTINE 1 PATCH: 21 PATCH, EXTENDED RELEASE TRANSDERMAL at 08:23

## 2022-12-05 RX ADMIN — DOCUSATE SODIUM 100 MG: 100 CAPSULE, LIQUID FILLED ORAL at 17:08

## 2022-12-05 RX ADMIN — METHOCARBAMOL 500 MG: 500 TABLET ORAL at 20:58

## 2022-12-05 RX ADMIN — METHOCARBAMOL 500 MG: 500 TABLET ORAL at 11:27

## 2022-12-05 NOTE — SOCIAL WORK
Sw met with pt for check in  Pt endorses high dep, anx, hopelessness  Pt denies current SI/HI, but states "I don't want to live but I know hurting myself if not good"  Pt does not have a plan to hurt self on unit, stating "I just want to feel better"  Pt endorses not feeling well, is exhausted, wants to feel better with medication  Pt presents calm, cooperative  Pt discussed wanting to send fax for banking, sw encouraged pt to speak to provider  Sw provided support, reassurance

## 2022-12-05 NOTE — PROGRESS NOTES
12/05/22 0730   Activity/Group Checklist   Group   (Morning Community Meeting/Daily Check-In)   Attendance Attended   Attendance Duration (min) 46-60   Interactions Interacted appropriately   Affect/Mood Appropriate;Calm   Goals Achieved Identified feelings; Discussed coping strategies; Able to listen to others; Able to engage in interactions; Able to reflect/comment on own behavior;Able to manage/cope with feelings

## 2022-12-05 NOTE — NURSING NOTE
Patient visible in milieu this evening, less distressed and boisterous this evening than the previous  Patient is pleasant and polite on approach  She continues to endorse high depression and anxiety related to psychosis  Reports trying to keep herself busy  Social with select peers  Visible for snack  Compliant with scheduled medication as ordered  Denies thoughts of harming herself  Makes needs known  Will remain on safety precautions and continual monitoring

## 2022-12-05 NOTE — PROGRESS NOTES
22    Team Meeting   Meeting Type Daily Rounds   Team Members Present   Team Members Present Physician;Nurse;   Physician Team Member Dr Rey Carrasco MD; Sarah    Nursing Team Member Armond Allen RN; Santa Velazquez RN   Social Work Team Member Linda Lizama [place]   Patient/Family Present   Patient Present No   Patient's Family Present No     Screaming in room Saturday, Sun-Ativan PO PRN effective and no yelling, cooperative, med comp, constant PRNs

## 2022-12-05 NOTE — SOCIAL WORK
Sw called [facility] ([facility] signed) [phone], [employer] (House Supervisor)  Pt is able to return home once stabilized  Pt's symptoms (yelling, pacing, not violent, verbally aggressive with [facility] staff redirection, SI thoughts voiced) have escalated since last dc, have been reoccurring for 'quite awhile'  Call ended mutually  Sw called agency Nurse Navjot Quintenrosy [phone] notified of pts admission, tx, progress  Sw to notify Judi Galo know once dc date is established  Once stabilized, pt is safe to return home  [facility] staff perfer no Friday dc due to staffing, if possible  Call ended mutually

## 2022-12-05 NOTE — PLAN OF CARE
Problem: Alteration in Thoughts and Perception  Goal: Verbalize thoughts and feelings  Description: Interventions:  - Promote a nonjudgmental and trusting relationship with the patient through active listening and therapeutic communication  - Assess patient's level of functioning, behavior and potential for risk  - Engage patient in 1 on 1 interactions  - Encourage patient to express fears, feelings, frustrations, and discuss symptoms    - [place] patient to reality, help patient recognize reality-based thinking   - Administer medications as ordered and assess for potential side effects  - Provide the patient education related to the signs and symptoms of the illness and desired effects of prescribed medications  Outcome: Progressing     Problem: Ineffective Coping  Goal: Identifies healthy coping skills  Outcome: Progressing     Problem: Risk for Self Injury/Neglect  Goal: Refrain from harming self  Description: Interventions:  - Monitor patient closely, per order  - Develop a trusting relationship  - Supervise medication ingestion, monitor effects and side effects   Outcome: Progressing

## 2022-12-05 NOTE — NURSING NOTE
Patient given PRN zyprexa 2 5 for agitation  Patient observed to be sitting in dining room watching tv  Patient reports the zyprexa "kind of" helped    Patient states "I guess Im ok, I just don't feel right "

## 2022-12-05 NOTE — NURSING NOTE
Patient given 5mg PRN zyprexa at 1530 for severe agitation  Patient now resting in room  Upon approach patient is calm and cooperative  Reports zyprexa had good effect  Patient then states "Can I ask you something, are they hypnotizing me here?"  Explained to the patient this is not happening and she states "[id number] [address] they keep telling me they are, Its hard not to believe them, but I have to stop listening to them "  Encouraged patient to seek out staff any time needed for support

## 2022-12-05 NOTE — PROGRESS NOTES
Progress Note - [address] [facility],[address] Adonis 47 y o  female MRN: [id number]   Unit/Bed#: [facility] 247-01 Encounter: [id number]    Behavior over the last 24 hours: unchanged  Marea Congress seen today, per staff report has been withdrawn to self and seen responding to internal stimuli on the unit  Patient seen in follow-up today  Reports she is feeling “not so great “  Continues to complaints of hallucinations, feeling hopeless and paranoid  She feels that she has been hypnotized by a group of people who are against her  Patient remains psychotic and dysphoric  Denies active thoughts of self-harm others  Sleep intermittent, appetite fair, denies side effects to medications      Mental Status Evaluation:    Appearance:  casually dressed, adequate grooming, looks stated age, overweight   Behavior:  guarded   Speech:  slow, soft   Mood:  anxious   Affect:  blunted   Thought Process:  circumstantial, concrete, poverty of thought   Associations: circumstantial associations   Thought Content:  paranoid delusions   Perceptual Disturbances: auditory hallucinations   Risk Potential: Suicidal ideation - None at present  Homicidal ideation - None at present   Sensorium:  oriented to person, place and time/date   Memory:  recent and remote memory grossly intact   Consciousness:  alert and awake   Attention: decreased concentration and decreased attention span   Insight:  impaired   Judgment: impaired   Gait/Station: normal gait/station   Motor Activity: no abnormal movements     Vital signs in last 24 hours:    Temp:  [97 8 °F (36 6 °C)-98 6 °F (37 °C)] 97 8 °F (36 6 °C)  HR:  [85-93] 85  Resp:  [16-18] 18  BP: ()/(65-77) 117/77    Laboratory results: I have personally reviewed all pertinent laboratory/tests results          Assessment/Plan   Principal Problem:    Schizoaffective disorder, bipolar type ([facility] )    Recommended Treatment:     Planned medication and treatment changes:  Continue Clozaril 200 mg at bedtime, increased on Sunday December 4th  Continue Clozaril 50 mg b i d    Continue lorazepam 0 5 mg daily and 1 mg at bedtime  Increase Effexor XR to 112 5 mg daily  All current active medications have been reviewed  Encourage group therapy, milieu therapy and occupational therapy  Behavioral Health checks every 7 minutes  Current Facility-Administered Medications   Medication Dose Route Frequency Provider Last Rate   • acetaminophen  650 mg Oral Q6H PRN Ghassan Mallet Medei, CRNP     • acetaminophen  650 mg Oral Q4H PRN [facility] Mallet Medei, CRNP     • acetaminophen  975 mg Oral Q6H PRN Ghassan Mallet Medei, CRNP     • albuterol  2 puff Inhalation Q4H PRN [facility] Mallet Medei, CRNP     • aluminum-magnesium hydroxide-simethicone  30 mL Oral Q4H PRN [facility], [facility]     • haloperidol lactate  5 mg Intramuscular Q4H PRN Max 4/day [facility], CR[facility]      And   • LORazepam  2 mg Intramuscular Q4H PRN Max 4/day [facility], CRNP      And   • benztropine  1 mg Intramuscular Q4H PRN Max 4/day [facility], CRNP     • benztropine  1 mg Oral Q6H PRN [facility], CRNP     • cholecalciferol  1,000 Units Oral Daily [facility], CRNP     • cloZAPine  200 mg Oral HS Jordan C Holter, DO     • cloZAPine  50 mg Oral Daily Ryan See MD     • cloZAPine  50 mg Oral After Issa Romero MD     • hydrOXYzine HCL  50 mg Oral Q6H PRN Max 4/day [facility], CRNP      Or   • diphenhydrAMINE  50 mg Intramuscular Q6H PRN [facility], CRNP     • docusate sodium  100 mg Oral BID Payton Conrad PA-C     • fenofibrate  145 mg Oral Daily Bri Mazariegos, CRNP     • fluticasone  1 spray Each Nare BID Ghassan Mallet Medei, CRNP     • hydrocortisone   Topical 4x Daily PRN Payton Conrad PA-C     • hydrocortisone   Topical 4x Daily PRN Payton Conrad PA-C     • hydrOXYzine HCL  100 mg Oral Q6H PRN Max 4/day Bri Mazariegos, CRKALLIE      Or   • LORazepam  2 mg Intramuscular Q6H PRN JARED Chávez     • hydrOXYzine HCL  25 mg Oral Q6H PRN Max 4/day Ghassan Mallet Medei, CRNP     • loratadine  10 mg Oral Daily Ghassan Mallet Medei, CRNP     • LORazepam  0 5 mg Oral Q8H PRN Justyn Vidales MD     • LORazepam  0 5 mg Oral Daily Justyn Vidales MD     • LORazepam  1 mg Oral HS [place] C Holter, DO     • methocarbamol  500 mg Oral Q6H PRN Payton Conrad PA-C     • nicotine  1 patch Transdermal Daily Ghassan Mallet Medei, CRNP     • OLANZapine  2 5 mg Oral Q6H PRN [facility] Medei, CRNP     • OLANZapine  5 mg Oral Q6H PRN Ghassan Mallet Medei, CRNP     • pantoprazole  40 mg Oral Early Morning Ghassan Mallet Medei, CRNP     • polyethylene glycol  17 g Oral Daily PRN Ghassan Mallet Medei, CRNP     • traZODone  100 mg Oral HS PRN [facility] JARED CASE     • traZODone  25 mg Oral Q8H PRN Ghassan Mallet Medei, CRNP     • venlafaxine  75 mg Oral Daily Ghassan Mallet Medei, CRNP         Risks / Benefits of Treatment:    Risks, benefits, and possible side effects of medications explained to patient and patient verbalizes understanding and agreement for treatment  Counseling / Coordination of Care: Total floor / unit time spent today 25 minutes  Greater than 50% of total time was spent with the patient and / or family counseling and / or coordination of care  A description of counseling / coordination of care:  Patient's progress discussed with staff in treatment team meeting  Medications, treatment progress and treatment plan reviewed with patient      Clifford Quintana MD 12/05/22

## 2022-12-05 NOTE — PROGRESS NOTES
Progress Note - Macie Sahu 47 y o  female MRN: [id number]    Unit/Bed#: [facility] 247-01 Encounter: [id number]        Subjective:   Patient seen and examined at bedside after reviewing the chart and discussing the case with the caring staff  Patient examined at bedside  Patient is requesting to be tested for STDs as she had sex with a person she does not know  Physical Exam   Vitals: Blood pressure 117/77, pulse 85, temperature 97 8 °F (36 6 °C), temperature source Temporal, resp  rate 18, height 5' 1" (1 549 m), weight 70 3 kg (155 lb), SpO2 91 %, not currently breastfeeding  ,Body mass index is 29 29 kg/m²  Constitutional: Patient in no acute distress  HEENT: PERR, EOMI, MMM  Cardiovascular: Normal rate and regular rhythm  Pulmonary/Chest: Effort normal and breath sounds normal    Abdomen: +BS, soft, NT    Assessment/Plan:  Macie Sahu is a 47y o  year old female with schizoaffective disorder      1  GERD   Continue Protonix 40 mg daily, Mylanta as needed  2  Tobacco abuse   Patient is on nicotine transdermal patch 21 mg/24 hr   3  Hyperlipidemia   Continue fenofibrate 145 mg daily  4  Constipation   Start Colace 100 mg twice daily  Patient may take MiraLax as needed  5  Asthma   Patient may use albuterol inhaler as needed      6  Allergic rhinitis  Patient is on Claritin 10 mg daily and Flonase nasal spray twice daily  7  Vitamin-D deficiency  Continue vitamin D3 1000 units daily     8  Hemorrhoids   Patient may use Anusol cream as needed  9  Concerns for STI  I will get STI panel for the patient including HIV, GC/Chlamydia an RPR

## 2022-12-05 NOTE — NURSING NOTE
Patient has been visible on unit today, mostly isolative to self  Restless, labile, and irritable today  Spending much time pacing acharya  Observed to be responding to self  Pt  Had a few outbursts in room  Reports feeling depressed today and hopeless  Denies suicidal ideations, and feels safe on the unit  Admits to [facility]  After outburst in afternoon r/t hallucinations, patient requested PRN ativan  Encouraged to utilize zyprexa  Initially refused and states "that doesn't help me enough "  Then requested PRN anurag King NP notified, order changed, patient then states " forget it, I want zyprexa "  Patient given PRN 5mg zyprexa  Patient now resting in room  Compliant with all scheduled medications  Safety checks maintained  Staff availability reinforced

## 2022-12-06 RX ADMIN — VENLAFAXINE HYDROCHLORIDE 112.5 MG: 75 CAPSULE, EXTENDED RELEASE ORAL at 09:45

## 2022-12-06 RX ADMIN — LORAZEPAM 0.5 MG: 0.5 TABLET ORAL at 09:46

## 2022-12-06 RX ADMIN — LORATADINE 10 MG: 10 TABLET ORAL at 09:45

## 2022-12-06 RX ADMIN — OLANZAPINE 5 MG: 5 TABLET, FILM COATED ORAL at 16:07

## 2022-12-06 RX ADMIN — CLOZAPINE 50 MG: 25 TABLET ORAL at 15:54

## 2022-12-06 RX ADMIN — POLYETHYLENE GLYCOL 3350 17 G: 17 POWDER, FOR SOLUTION ORAL at 15:12

## 2022-12-06 RX ADMIN — DOCUSATE SODIUM 100 MG: 100 CAPSULE, LIQUID FILLED ORAL at 09:45

## 2022-12-06 RX ADMIN — LORAZEPAM 1 MG: 1 TABLET ORAL at 20:20

## 2022-12-06 RX ADMIN — CLOZAPINE 50 MG: 25 TABLET ORAL at 09:45

## 2022-12-06 RX ADMIN — BENZTROPINE MESYLATE 1 MG: 1 TABLET ORAL at 20:21

## 2022-12-06 RX ADMIN — ACETAMINOPHEN 650 MG: 325 TABLET ORAL at 20:19

## 2022-12-06 RX ADMIN — METHOCARBAMOL 500 MG: 500 TABLET ORAL at 20:21

## 2022-12-06 RX ADMIN — CLOZAPINE 200 MG: 100 TABLET ORAL at 20:21

## 2022-12-06 RX ADMIN — TRAZODONE HYDROCHLORIDE 25 MG: 50 TABLET ORAL at 17:42

## 2022-12-06 RX ADMIN — NICOTINE 1 PATCH: 21 PATCH, EXTENDED RELEASE TRANSDERMAL at 09:48

## 2022-12-06 RX ADMIN — DOCUSATE SODIUM 100 MG: 100 CAPSULE, LIQUID FILLED ORAL at 17:42

## 2022-12-06 RX ADMIN — Medication 1000 UNITS: at 09:46

## 2022-12-06 RX ADMIN — FENOFIBRATE 145 MG: 145 TABLET, COATED ORAL at 09:45

## 2022-12-06 NOTE — PROGRESS NOTES
12/06/22   Team Meeting   Meeting Type Daily Rounds   Team Members Present   Team Members Present ;Physician;Nurse   Physician Team Member Dr Vargas burnham, DO; HOSP DR RADHA SILVA, [address]   Nursing Team Member Evangelist Davison, [address] [place] [address]   Social Work Team Member Baljinder Montaño, [place]   Patient/Family Present   Patient Present No   Patient's Family Present No     PRN zyprexa effective for AH, denies all other psych symptoms, pt voices dep is improved, declined to wake this AM for labs/meds, showered, cogentin/trazedone effective last night for restless legs

## 2022-12-06 NOTE — PROGRESS NOTES
12/06/22 0800   Activity/Group Checklist   Group Community meeting   Attendance Did not attend  (Pt offered grp; pt remained in bed)

## 2022-12-06 NOTE — SOCIAL WORK
[facility] ACT Nurse Abigail Held left [facility] [phone], [phone]   called Colletta Morgans back for pt update, progress  Pt's baseline: slightly paranoid, forced fed, hypnotized and will talk of hypnosis daily  Colletta Morgans has known pt for 13 years, pt's symptoms have increased with age, it harder to maintain baseline, has hx ETOH abuse  ACT team planned to increase Clozaril, was at 500mg/day before last OA AIP admission  Call ended mutually

## 2022-12-06 NOTE — PROGRESS NOTES
Progress Note - Behavioral Health   Yann Castellanos 47 y o  female MRN: [id number]  Unit/Bed#: [facility] 247-01 Encounter: [id number]    Assessment/Plan   Principal Problem:    Schizoaffective disorder, bipolar type ([address] )      Behavior over the last 24 hours: Some improvement  Sleep: normal  Appetite: normal  Medication side effects: No  ROS: no complaints and all other systems are negative    Ousmane Bird was seen this morning for psychiatric follow-up  She continues to endorse depression rating 8/10 accompanied by intermittent non commanding auditory hallucinations  Paranoia persists, however slowly improving  Denies VH/SI/HI  Had episode of increased agitation and worsening psychosis yesterday afternoon when another peer on the unit became upset; PRN Zyprexa for moderate agitation administered and effective  Ousmane Bird denmirta any sleep disturbance  Denies anxiety and states "the depression is what's bothering me most "  Treatment plan discussed with patient  Continues to inquire about as needed lorazepam; medication education provided and patient receptive  Tolerating increase in Effexor well with no adverse effects  According to nursing staff, Ousmane Bird has been attending groups and adhering to ADLs  Cooperative with medications and meals      Mental Status Evaluation:  Appearance:  age appropriate, casually dressed and blond hair   Behavior:  Cooperative, calm   Speech:  normal pitch and normal volume   Mood:  depressed and dysthymic   Affect:  constricted, mood-congruent and redirectable   Thought Process:  circumstantial   Associations: circumstantial associations   Thought Content:  Paranoid   Perceptual Disturbances: Intermittent not commanding auditory hallucinations, denies VH   Risk Potential: Suicidal Ideations none  Homicidal Ideations none  Potential for Aggression No   Sensorium:  person, place and time/date   Memory:  recent and remote memory grossly intact, occasional forgetfulness   Consciousness:  alert and awake Attention: attention span appeared shorter than expected for age   Insight:  Impaired   Judgment: limited   Gait/Station: normal gait/station and normal balance   Motor Activity: no abnormal movements     Progress Toward Goals: Some improvement  Reports improving paranoia and anxiety  Depression and intermittent AH persist   Effexor increased yesterday to 112 5 mg PO QD for depression and anxiety  Continue with weekly CBCD Q Thursday while on Clozaril therapy; clozaril level due 12/8/22  Discontinue as needed lorazepam since patient reports improving anxiety and receiving standing dose of lorazepam twice daily  If as needed anxiolytic required, please utilize PRN hydroxyzine or trazodone  Continue remainder of her psychotropic regimen as ordered  No discharge at this time  Recommended Treatment: Continue with group therapy, milieu therapy and occupational therapy  Risks, benefits and possible side effects of Medications:   Risks, benefits, and possible side effects of medications explained to patient and patient verbalizes understanding        Medications:   Discontinue PRN lorazepam  all current active meds have been reviewed and current meds:   Current Facility-Administered Medications   Medication Dose Route Frequency   • acetaminophen (TYLENOL) tablet 650 mg  650 mg Oral Q6H PRN   • acetaminophen (TYLENOL) tablet 650 mg  650 mg Oral Q4H PRN   • acetaminophen (TYLENOL) tablet 975 mg  975 mg Oral Q6H PRN   • albuterol (PROVENTIL HFA,VENTOLIN HFA) inhaler 2 puff  2 puff Inhalation Q4H PRN   • aluminum-magnesium hydroxide-simethicone (MYLANTA) oral suspension 30 mL  30 mL Oral Q4H PRN   • haloperidol lactate (HALDOL) injection 5 mg  5 mg Intramuscular Q4H PRN Max 4/day    And   • LORazepam (ATIVAN) injection 2 mg  2 mg Intramuscular Q4H PRN Max 4/day    And   • benztropine (COGENTIN) injection 1 mg  1 mg Intramuscular Q4H PRN Max 4/day   • benztropine (COGENTIN) tablet 1 mg  1 mg Oral Q6H PRN   • cholecalciferol (VITAMIN D3) tablet 1,000 Units  1,000 Units Oral Daily   • cloZAPine (CLOZARIL) tablet 200 mg  200 mg Oral HS   • cloZAPine (CLOZARIL) tablet 50 mg  50 mg Oral Daily   • cloZAPine (CLOZARIL) tablet 50 mg  50 mg Oral Before Dinner   • hydrOXYzine HCL (ATARAX) tablet 50 mg  50 mg Oral Q6H PRN Max 4/day    Or   • diphenhydrAMINE (BENADRYL) injection 50 mg  50 mg Intramuscular Q6H PRN   • docusate sodium (COLACE) capsule 100 mg  100 mg Oral BID   • fenofibrate (TRICOR) tablet 145 mg  145 mg Oral Daily   • fluticasone (FLONASE) 50 mcg/act nasal spray 1 spray  1 spray Each Nare BID   • hydrocortisone (ANUSOL-HC) 2 5 % rectal cream   Topical 4x Daily PRN   • hydrocortisone 2 5 % cream   Topical 4x Daily PRN   • hydrOXYzine HCL (ATARAX) tablet 100 mg  100 mg Oral Q6H PRN Max 4/day    Or   • LORazepam (ATIVAN) injection 2 mg  2 mg Intramuscular Q6H PRN   • hydrOXYzine HCL (ATARAX) tablet 25 mg  25 mg Oral Q6H PRN Max 4/day   • loratadine (CLARITIN) tablet 10 mg  10 mg Oral Daily   • LORazepam (ATIVAN) tablet 0 5 mg  0 5 mg Oral Daily   • LORazepam (ATIVAN) tablet 1 mg  1 mg Oral HS   • methocarbamol (ROBAXIN) tablet 500 mg  500 mg Oral Q6H PRN   • nicotine (NICODERM CQ) 21 mg/24 hr TD 24 hr patch 1 patch  1 patch Transdermal Daily   • OLANZapine (ZyPREXA) tablet 2 5 mg  2 5 mg Oral Q6H PRN   • OLANZapine (ZyPREXA) tablet 5 mg  5 mg Oral Q6H PRN   • pantoprazole (PROTONIX) EC tablet 40 mg  40 mg Oral Early Morning   • polyethylene glycol (MIRALAX) packet 17 g  17 g Oral Daily PRN   • traZODone (DESYREL) tablet 100 mg  100 mg Oral HS PRN   • traZODone (DESYREL) tablet 25 mg  25 mg Oral Q8H PRN   • venlafaxine (EFFEXOR-XR) 24 hr capsule 112 5 mg  112 5 mg Oral Daily     Labs: I have personally reviewed all pertinent laboratory/tests results     CBC:   Lab Results   Component Value Date    WBC 8 81 12/02/2022    RBC 4 68 12/02/2022    HGB 13 4 12/02/2022    HCT 42 5 12/02/2022    MCV 91 12/02/2022     12/02/2022    MCH 28 6 12/02/2022    MCHC 31 5 12/02/2022    RDW 15 1 12/02/2022    MPV 9 4 12/02/2022    NEUTROABS 5 91 12/02/2022     CMP:   Lab Results   Component Value Date    SODIUM 138 12/02/2022    K 3 9 12/02/2022     12/02/2022    CO2 27 12/02/2022    AGAP 7 12/02/2022    BUN 8 12/02/2022    CREATININE 0 66 12/02/2022    GLUC 95 12/02/2022    GLUF 95 12/02/2022    CALCIUM 9 3 12/02/2022    AST 22 12/02/2022    ALT 22 12/02/2022    ALKPHOS 67 12/02/2022    TP 7 0 12/02/2022    ALB 4 2 12/02/2022    TBILI 0 23 12/02/2022    EGFR 102 12/02/2022     Clozapine:   Lab Results   Component Value Date    CLOZAPINE 980 (H) 10/28/2022    NCLOZIP 838 10/28/2022     Counseling / Coordination of Care  Total floor / unit time spent today 30 minutes  Greater than 50% of total time was spent with the patient and / or family counseling and / or coordination of care   A description of the counseling / coordination of care: Medication education, treatment, supportive therapy

## 2022-12-06 NOTE — NURSING NOTE
Patient resting in bed medicated prior shift for an outburst in regards to her hallucinations with zyprexa 5mg po which was effective  Patients reassessment she was calm and pleasant stated"she feels much better" no voices  Patient currently denies SI HI AVH anxiety but endorsed depression but stated improvement  Patient request cogentin and robaxin for restless legs/spasms administered 2058 with positive results  Prior shift reported patient asking them "if she is being hypnotized here  She also stated she has to not listen to them  Patient denies all to this nurse  Effexor increased to 112 5mg  Encourage patient to utilize zyprexa/trazodone prn for agitiation along with coping skills rather than ativan  Q 7 min safety checks maintained

## 2022-12-06 NOTE — PLAN OF CARE
Problem: Alteration in Thoughts and Perception  Goal: Treatment Goal: Gain control of psychotic behaviors/thinking, reduce/eliminate presenting symptoms and demonstrate improved reality functioning upon discharge  Outcome: Progressing  Goal: Verbalize thoughts and feelings  Description: Interventions:  - Promote a nonjudgmental and trusting relationship with the patient through active listening and therapeutic communication  - Assess patient's level of functioning, behavior and potential for risk  - Engage patient in 1 on 1 interactions  - Encourage patient to express fears, feelings, frustrations, and discuss symptoms    - [place] patient to reality, help patient recognize reality-based thinking   - Administer medications as ordered and assess for potential side effects  - Provide the patient education related to the signs and symptoms of the illness and desired effects of prescribed medications  Outcome: Progressing  Goal: Refrain from acting on delusional thinking/internal stimuli  Description: Interventions:  - Monitor patient closely, per order   - Utilize least restrictive measures   - Set reasonable limits, give positive feedback for acceptable   - Administer medications as ordered and monitor of potential side effects  Outcome: Progressing     Problem: Ineffective Coping  Goal: Cooperates with admission process  Description: Interventions:   - Complete admission process  Outcome: Not Progressing  Goal: Identifies healthy coping skills  Outcome: Not Progressing  Goal: Demonstrates healthy coping skills  Outcome: Not Progressing  Goal: Participates in unit activities  Description: Interventions:  - Provide therapeutic environment   - Provide required programming   - Redirect inappropriate behaviors   Outcome: Not Progressing     Problem: Risk for Self Injury/Neglect  Goal: Treatment Goal: Remain safe during length of stay, learn and adopt new coping skills, and be free of self-injurious ideation, impulses and acts at the time of discharge  Outcome: Progressing  Goal: Refrain from harming self  Description: Interventions:  - Monitor patient closely, per order  - Develop a trusting relationship  - Supervise medication ingestion, monitor effects and side effects   Outcome: Progressing     Problem: Depression  Goal: Treatment Goal: Demonstrate behavioral control of depressive symptoms, verbalize feelings of improved mood/affect, and adopt new coping skills prior to discharge  Outcome: Progressing  Goal: Refrain from isolation  Description: Interventions:  - Develop a trusting relationship   - Encourage socialization   Outcome: Progressing  Goal: Refrain from self-neglect  Outcome: Progressing  Goal: Complete daily ADLs, including personal hygiene independently, as able  Description: Interventions:  - Observe, teach, and assist patient with ADLS  -  Monitor and promote a balance of rest/activity, with adequate nutrition and elimination   Outcome: Progressing     Problem: Depression  Goal: Treatment Goal: Demonstrate behavioral control of depressive symptoms, verbalize feelings of improved mood/affect, and adopt new coping skills prior to discharge  Outcome: Progressing  Goal: Refrain from isolation  Description: Interventions:  - Develop a trusting relationship   - Encourage socialization   Outcome: Progressing  Goal: Refrain from self-neglect  Outcome: Progressing  Goal: Complete daily ADLs, including personal hygiene independently, as able  Description: Interventions:  - Observe, teach, and assist patient with ADLS  -  Monitor and promote a balance of rest/activity, with adequate nutrition and elimination   Outcome: Progressing

## 2022-12-06 NOTE — SOCIAL WORK
Pt called sw to discuss debit card  [place] explained JARED is aware, encouraged pt to meet with provider again for approval to fax financial documents on unit  Call ended mutually

## 2022-12-06 NOTE — NURSING NOTE
Patient con to c/o anxiety r/t hallucinations so gave patient 25mg trazodone will monitor for effectiveness

## 2022-12-06 NOTE — PROGRESS NOTES
Progress Note - Karri Daugherty 47 y o  female MRN: [id number]    Unit/Bed#: [facility] 247-01 Encounter: [id number]        Subjective:   Patient seen and examined at bedside after reviewing the chart and discussing the case with the caring staff  Patient examined at bedside  Patient reports no acute symptoms  Patient's STI panel is still pending  Physical Exam   Vitals: Blood pressure 141/78, pulse 74, temperature 98 8 °F (37 1 °C), temperature source Temporal, resp  rate 18, height 5' 1" (1 549 m), weight 70 3 kg (155 lb), SpO2 94 %, not currently breastfeeding  ,Body mass index is 29 29 kg/m²  Constitutional: Patient in no acute distress  HEENT: PERR, EOMI, MMM  Cardiovascular: Normal rate and regular rhythm  Pulmonary/Chest: Effort normal and breath sounds normal    Abdomen: +BS, soft, NT    Assessment/Plan:  Karri Daugherty is a 47y o  year old female with schizoaffective disorder      1  GERD   Continue Protonix 40 mg daily, Mylanta as needed  2  Tobacco abuse   Patient is on nicotine transdermal patch 21 mg/24 hr   3  Hyperlipidemia   Continue fenofibrate 145 mg daily  4  Constipation   Start Colace 100 mg twice daily  Patient may take MiraLax as needed  5  Asthma   Patient may use albuterol inhaler as needed      6  Allergic rhinitis  Patient is on Claritin 10 mg daily and Flonase nasal spray twice daily  7  Vitamin-D deficiency  Continue vitamin D3 1000 units daily     8  Hemorrhoids   Patient may use Anusol cream as needed  9  Concerns for STI  I will get STI panel for the patient including HIV, GC/Chlamydia an RPR

## 2022-12-06 NOTE — NURSING NOTE
Patient compliant with meds and meals  Patient pleasant and cooperative, social and visible  Patient thanked this writer for helping her get a number she needed out of her belongings and stated she was very grateful for my help  Patient did become upset in the afternoon and stated she need "something" and stated people were stalking her so gave patient 5mg zyprexa which was effective  Q 7 min behavioral and safety checks in place  Will cont to monitor

## 2022-12-07 LAB
C TRACH DNA SPEC QL NAA+PROBE: NEGATIVE
N GONORRHOEA DNA SPEC QL NAA+PROBE: NEGATIVE
RPR SER QL: NORMAL

## 2022-12-07 RX ORDER — HYDROXYZINE HYDROCHLORIDE 25 MG/1
25 TABLET, FILM COATED ORAL
Status: DISCONTINUED | OUTPATIENT
Start: 2022-12-07 | End: 2022-12-14 | Stop reason: HOSPADM

## 2022-12-07 RX ORDER — SORBITOL SOLUTION 70 %
30 SOLUTION, ORAL MISCELLANEOUS
Status: DISCONTINUED | OUTPATIENT
Start: 2022-12-07 | End: 2022-12-08

## 2022-12-07 RX ORDER — HYDROXYZINE HYDROCHLORIDE 10 MG/1
10 TABLET, FILM COATED ORAL
Status: DISCONTINUED | OUTPATIENT
Start: 2022-12-07 | End: 2022-12-14 | Stop reason: HOSPADM

## 2022-12-07 RX ORDER — LORAZEPAM 2 MG/ML
2 INJECTION INTRAMUSCULAR EVERY 6 HOURS PRN
Status: DISCONTINUED | OUTPATIENT
Start: 2022-12-07 | End: 2022-12-14 | Stop reason: HOSPADM

## 2022-12-07 RX ORDER — AMOXICILLIN 250 MG
1 CAPSULE ORAL
Status: DISCONTINUED | OUTPATIENT
Start: 2022-12-07 | End: 2022-12-14 | Stop reason: HOSPADM

## 2022-12-07 RX ORDER — OLANZAPINE 5 MG/1
5 TABLET ORAL
Status: DISCONTINUED | OUTPATIENT
Start: 2022-12-07 | End: 2022-12-14 | Stop reason: HOSPADM

## 2022-12-07 RX ORDER — BISACODYL 10 MG
10 SUPPOSITORY, RECTAL RECTAL DAILY PRN
Status: DISCONTINUED | OUTPATIENT
Start: 2022-12-07 | End: 2022-12-14 | Stop reason: HOSPADM

## 2022-12-07 RX ORDER — DIPHENHYDRAMINE HYDROCHLORIDE 50 MG/ML
50 INJECTION INTRAMUSCULAR; INTRAVENOUS EVERY 6 HOURS PRN
Status: DISCONTINUED | OUTPATIENT
Start: 2022-12-07 | End: 2022-12-14 | Stop reason: HOSPADM

## 2022-12-07 RX ORDER — OLANZAPINE 10 MG/1
10 TABLET ORAL
Status: DISCONTINUED | OUTPATIENT
Start: 2022-12-07 | End: 2022-12-14 | Stop reason: HOSPADM

## 2022-12-07 RX ORDER — HYDROXYZINE 50 MG/1
50 TABLET, FILM COATED ORAL
Status: DISCONTINUED | OUTPATIENT
Start: 2022-12-07 | End: 2022-12-14 | Stop reason: HOSPADM

## 2022-12-07 RX ADMIN — LORAZEPAM 1 MG: 1 TABLET ORAL at 20:26

## 2022-12-07 RX ADMIN — CLOZAPINE 50 MG: 25 TABLET ORAL at 16:16

## 2022-12-07 RX ADMIN — NICOTINE 1 PATCH: 21 PATCH, EXTENDED RELEASE TRANSDERMAL at 08:48

## 2022-12-07 RX ADMIN — PANTOPRAZOLE SODIUM 40 MG: 40 TABLET, DELAYED RELEASE ORAL at 05:43

## 2022-12-07 RX ADMIN — DOCUSATE SODIUM 100 MG: 100 CAPSULE, LIQUID FILLED ORAL at 17:16

## 2022-12-07 RX ADMIN — METHOCARBAMOL 500 MG: 500 TABLET ORAL at 20:30

## 2022-12-07 RX ADMIN — HYDROXYZINE HYDROCHLORIDE 25 MG: 25 TABLET, FILM COATED ORAL at 11:58

## 2022-12-07 RX ADMIN — POLYETHYLENE GLYCOL 3350 17 G: 17 POWDER, FOR SOLUTION ORAL at 14:41

## 2022-12-07 RX ADMIN — FENOFIBRATE 145 MG: 145 TABLET, COATED ORAL at 08:46

## 2022-12-07 RX ADMIN — HYDROCORTISONE: 25 CREAM TOPICAL at 17:20

## 2022-12-07 RX ADMIN — CLOZAPINE 50 MG: 25 TABLET ORAL at 08:46

## 2022-12-07 RX ADMIN — OLANZAPINE 5 MG: 5 TABLET, FILM COATED ORAL at 10:13

## 2022-12-07 RX ADMIN — SORBITOL SOLUTION (BULK) 30 ML: 70 SOLUTION at 17:16

## 2022-12-07 RX ADMIN — Medication 1000 UNITS: at 08:46

## 2022-12-07 RX ADMIN — HYDROCORTISONE 2.5%: 25 CREAM TOPICAL at 17:21

## 2022-12-07 RX ADMIN — DOCUSATE SODIUM 100 MG: 100 CAPSULE, LIQUID FILLED ORAL at 08:46

## 2022-12-07 RX ADMIN — TRAZODONE HYDROCHLORIDE 25 MG: 50 TABLET ORAL at 10:12

## 2022-12-07 RX ADMIN — VENLAFAXINE HYDROCHLORIDE 112.5 MG: 75 CAPSULE, EXTENDED RELEASE ORAL at 08:46

## 2022-12-07 RX ADMIN — CLOZAPINE 200 MG: 100 TABLET ORAL at 20:26

## 2022-12-07 RX ADMIN — LORATADINE 10 MG: 10 TABLET ORAL at 08:46

## 2022-12-07 RX ADMIN — LORAZEPAM 0.5 MG: 0.5 TABLET ORAL at 08:46

## 2022-12-07 NOTE — PROGRESS NOTES
12/05/22 1000   Activity/Group Checklist   Group   (Art Therapy)   Attendance Attended   Attendance Duration (min) Greater than 60   Interactions Interacted appropriately   Affect/Mood Appropriate;Calm   Goals Achieved Identified feelings; Identified relapse prevention strategies; Discussed coping strategies; Identified resources and support systems; Able to engage in interactions; Able to reflect/comment on own behavior;Able to listen to others

## 2022-12-07 NOTE — PROGRESS NOTES
12/07/22 1030   Activity/Group Checklist   Group   (Personal Strengths and Qualities Art Therapy)   Attendance Attended   Attendance Duration (min) 46-60   Interactions Interacted appropriately   Affect/Mood Appropriate;Bright   Goals Achieved Identified feelings; Discussed coping strategies; Discussed self-esteem issues; Increased hopefulness; Identified resources and support systems; Able to listen to others; Able to engage in interactions; Able to reflect/comment on own behavior;Able to manage/cope with feelings

## 2022-12-07 NOTE — SOCIAL WORK
Pt requested to speak to sw in [address] regarding faxing social security card, ID, birth certificate to social security office to have new SS debit card established  Sw spoke to provider regarding this who suggested team discussion on faxing these documents based on pts current presentation

## 2022-12-07 NOTE — PROGRESS NOTES
12/07/22   Team Meeting   Meeting Type Daily Rounds   Team Members Present   Team Members Present Physician;;Nurse   Physician Team Member Dr Eduardo Guzman, DO; HOSP DR RADHA SILVA, [address] [place] [address]   Nursing Team Member [facility]; Rufus Ugarte, YUDY   Social Work Team Member Diane Jensen [place]   Patient/Family Present   Patient Present No   Patient's Family Present No     Pt made call to social security office yesterday AM which was positive, in PM; increased AH, PRN Zyprexa, Trazodone effective, restless legs due to Zyprexa, blood work completed, utilized quiet room for [employer] (effective), CBC/Clozaril level due tomorrow

## 2022-12-07 NOTE — PROGRESS NOTES
Progress Note - Felecia Morin 47 y o  female MRN: [id number]    Unit/Bed#: [facility] 247-01 Encounter: [id number]        Subjective:   Patient seen and examined at bedside after reviewing the chart and discussing the case with the caring staff  Patient examined at bedside  Patient reports no acute symptoms  RPR nonreactive  HIV, CT/GC pending  Physical Exam   Vitals: Blood pressure 126/81, pulse 88, temperature 97 9 °F (36 6 °C), temperature source Temporal, resp  rate 18, height 5' 1" (1 549 m), weight 70 3 kg (155 lb), SpO2 93 %, not currently breastfeeding  ,Body mass index is 29 29 kg/m²  Constitutional: Patient in no acute distress  HEENT: PERR, EOMI, MMM  Cardiovascular: Normal rate and regular rhythm  Pulmonary/Chest: Effort normal and breath sounds normal    Abdomen: Nondistended  Assessment/Plan:  Felecia Morin is a 47y o  year old female with schizoaffective disorder      1  GERD   Continue Protonix 40 mg daily, Mylanta as needed  2  Tobacco abuse   Patient is on nicotine transdermal patch 21 mg/24 hr   3  Hyperlipidemia   Continue fenofibrate 145 mg daily  4  Constipation   Start Colace 100 mg twice daily  Patient may take MiraLax as needed  5  Asthma   Patient may use albuterol inhaler as needed      6  Allergic rhinitis  Patient is on Claritin 10 mg daily and Flonase nasal spray twice daily  7  Vitamin-D deficiency  Continue vitamin D3 1000 units daily     8  Hemorrhoids   Patient may use Anusol cream as needed  9  Concern for STI  HIV, GC/Chlamydia, RPR ordered  The patient was discussed with Dr Karli Mccabe and he is in agreement with the above note

## 2022-12-07 NOTE — NURSING NOTE
Patient visible on unit  Patient went into room and started yelling  When this nurse asked what wrong wrong, patient stated she is in a bad mood and  she felt like allot of people were touching her  Patient requested to sit in room 243  to get away from the people  Patient requested nighttime medications early  Patient also c/o 8/10 bilateral leg pain  Tylenol 650 mg given along with robaxin and cogentin  Patient denies SI,HI  Patient endorses anxiety, depression and Visual hallucinations  Will monitor  Safety checks continue Q 7 minutes

## 2022-12-07 NOTE — SOCIAL WORK
Sw met with pt in pts room for check in  Pt endorses mild anx/dep, but states she is finally starting to feel slightly better with medication adjustments  Pt endorses AH, nightmares for past 10-11 years  Pt states she feels tired and un-rested  Sw provided support, reassurance, validation for pts feelings  Additionally, pt voiced feeing paranoid about unit food, but states she will continue to eat

## 2022-12-07 NOTE — NURSING NOTE
Patient compliant with meds and meals  Denies everything but AH/VH at times  Patient was anxious and paranoid of her [address] and requested something to help so gave patient 5mg of zyprexa and 25mg trazodone at 1012 which was effective  Patient then c/o anxiety at [phone] so gave patient 25mg of atarax which was effective  Patient is visible and social at times, pleasant and cooperative but labile at times  Patient stated she is having nightmares but stated she needs to "get my meds back in my system and ill feel better" patient showed improve insight in regards to condition with a positive outlook on being med compliant  Q 7 min behavioral and safety checks in place

## 2022-12-07 NOTE — PLAN OF CARE
Problem: Alteration in Thoughts and Perception  Goal: Treatment Goal: Gain control of psychotic behaviors/thinking, reduce/eliminate presenting symptoms and demonstrate improved reality functioning upon discharge  Outcome: Progressing  Goal: Verbalize thoughts and feelings  Description: Interventions:  - Promote a nonjudgmental and trusting relationship with the patient through active listening and therapeutic communication  - Assess patient's level of functioning, behavior and potential for risk  - Engage patient in 1 on 1 interactions  - Encourage patient to express fears, feelings, frustrations, and discuss symptoms    - [place] patient to reality, help patient recognize reality-based thinking   - Administer medications as ordered and assess for potential side effects  - Provide the patient education related to the signs and symptoms of the illness and desired effects of prescribed medications  Outcome: Progressing  Goal: Refrain from acting on delusional thinking/internal stimuli  Description: Interventions:  - Monitor patient closely, per order   - Utilize least restrictive measures   - Set reasonable limits, give positive feedback for acceptable   - Administer medications as ordered and monitor of potential side effects  Outcome: Progressing     Problem: Ineffective Coping  Goal: Cooperates with admission process  Description: Interventions:   - Complete admission process  Outcome: Not Progressing  Goal: Identifies healthy coping skills  Outcome: Not Progressing  Goal: Demonstrates healthy coping skills  Outcome: Not Progressing  Goal: Participates in unit activities  Description: Interventions:  - Provide therapeutic environment   - Provide required programming   - Redirect inappropriate behaviors   Outcome: Not Progressing     Problem: Risk for Self Injury/Neglect  Goal: Treatment Goal: Remain safe during length of stay, learn and adopt new coping skills, and be free of self-injurious ideation, impulses and acts at the time of discharge  Outcome: Progressing  Goal: Refrain from harming self  Description: Interventions:  - Monitor patient closely, per order  - Develop a trusting relationship  - Supervise medication ingestion, monitor effects and side effects   Outcome: Progressing     Problem: Depression  Goal: Treatment Goal: Demonstrate behavioral control of depressive symptoms, verbalize feelings of improved mood/affect, and adopt new coping skills prior to discharge  Outcome: Progressing  Goal: Refrain from isolation  Description: Interventions:  - Develop a trusting relationship   - Encourage socialization   Outcome: Progressing  Goal: Refrain from self-neglect  Outcome: Progressing  Goal: Complete daily ADLs, including personal hygiene independently, as able  Description: Interventions:  - Observe, teach, and assist patient with ADLS  -  Monitor and promote a balance of rest/activity, with adequate nutrition and elimination   Outcome: Progressing     Problem: Anxiety  Goal: Anxiety is at manageable level  Description: Interventions:  - Assess and monitor patient's anxiety level  - Monitor for signs and symptoms (heart palpitations, chest pain, shortness of breath, headaches, nausea, feeling jumpy, restlessness, irritable, apprehensive)  - Collaborate with interdisciplinary team and initiate plan and interventions as ordered    - [place] patient to unit/surroundings  - Explain treatment plan  - Encourage participation in care  - Encourage verbalization of concerns/fears  - Identify coping mechanisms  - Assist in developing anxiety-reducing skills  - Administer/offer alternative therapies  - Limit or eliminate stimulants  Outcome: Progressing     Problem: Ineffective Coping  Goal: Participates in unit activities  Description: Interventions:  - Provide therapeutic environment   - Provide required programming   - Redirect inappropriate behaviors   Outcome: Progressing     Problem: DISCHARGE PLANNING - CARE MANAGEMENT  Goal: Discharge to post-acute care or home with appropriate resources  Description: INTERVENTIONS:  - Conduct assessment to determine patient/family and health care team treatment goals, and need for post-acute services based on payer coverage, community resources, and patient preferences, and barriers to discharge  - Address psychosocial, clinical, and financial barriers to discharge as identified in assessment in conjunction with the patient/family and health care team  - Arrange appropriate level of post-acute services according to patient’s   needs and preference and payer coverage in collaboration with the physician and health care team  - Communicate with and update the patient/family, physician, and health care team regarding progress on the discharge plan  - Arrange appropriate transportation to post-acute venues  Outcome: Progressing

## 2022-12-07 NOTE — PLAN OF CARE
Problem: DISCHARGE PLANNING - CARE MANAGEMENT  Goal: Discharge to post-acute care or home with appropriate resources  Description: INTERVENTIONS:  - Conduct assessment to determine patient/family and health care team treatment goals, and need for post-acute services based on payer coverage, community resources, and patient preferences, and barriers to discharge  - Address psychosocial, clinical, and financial barriers to discharge as identified in assessment in conjunction with the patient/family and health care team  - Arrange appropriate level of post-acute services according to patient’s   needs and preference and payer coverage in collaboration with the physician and health care team  - Communicate with and update the patient/family, physician, and health care team regarding progress on the discharge plan  - Arrange appropriate transportation to post-acute venues  Outcome: Progressing     Pt progressing, no dc date, dc to [facility] with ACT follow up

## 2022-12-07 NOTE — PROGRESS NOTES
12/07/22 0800   Activity/Group Checklist   Group   (Morning Community Meeting/Daily Check-In)   Attendance Attended   Attendance Duration (min) 46-60   Interactions Interacted appropriately   Affect/Mood Appropriate   Goals Achieved Identified feelings; Discussed coping strategies; Able to listen to others; Able to engage in interactions; Able to reflect/comment on own behavior

## 2022-12-07 NOTE — PROGRESS NOTES
Progress Note - Behavioral Health   Damion Bolaños 47 y o  female MRN: [id number]  Unit/Bed#: -01 Encounter: [id number]    Assessment/Plan   Principal Problem:    Schizoaffective disorder, bipolar type ([address] )      Behavior over the last 24 hours:  unchanged  Sleep: normal  Appetite: normal  Medication side effects: No  ROS: no complaints and all other systems are negative    Orin Chowdary was seen this morning for psychiatric follow-up  She has been increasingly withdrawn and describes mood as "terrible "  Continues to endorse ongoing command AH; "they're coming from the vents and telling me to take medications I don't need and saying the food is poisoned "  Reports AH tell her to harm herself and became tearful  Patient able to contract for safety and support provided  Remains fixated on medications and continues to inquire about lorazepam   Then asked about mood stabilizers and ECT  Medication education provided and patient was receptive  Reports mild effectiveness from PRN Zyprexa  Rates depression as high 8/10 with moderate anxiety  Affect is distressed and distracted, however response well to verbal redirection and support  Paranoia and bizarre delusions persist, "I know I'm being hypnotized  I can't believe they [AH] make me think this is my reality "  According to nursing staff, patient at times yells out and screams to [facility]  Denies VH/HI  Intermittently participates in groups and states "sometimes I just feel that makes me more agitated "    Mental Status Evaluation:  Appearance:  disheveled   Behavior:  Cooperative, distracted, redirectable, isolative to self   Speech:  normal pitch and normal volume   Mood:  depressed and dysthymic   Affect:  constricted and Irritable edge, redirectable   Thought Process:  circumstantial   Associations: circumstantial associations   Thought Content:  Paranoid and bizarre delusions   Perceptual Disturbances:  Auditory hallucinations with commands telling her to harm herself and "take medications that I shouldn't," able to contract for safety   Denies VH   Risk Potential: Suicidal Ideations none at present  Homicidal Ideations none  Potential for Aggression Yes due to paranoia and command AH   Sensorium:  person, place, time/date and situation   Memory:  recent and remote memory grossly intact, occasional forgetfulness   Consciousness:  alert and awake    Attention: attention span and concentration were age appropriate   Insight:  Impaired   Judgment: limited   Gait/Station: normal gait/station and normal balance   Motor Activity: no abnormal movements     Progress Toward Goals: No improvement  Continues to endorse high depression and ongoing AH  Denies SI and able to contract for safety on unit  CBCD and Clozaril level due tomorrow, 12/8/2022  We will refrain from further titrating Clozaril at this time due to elevated level of 980 10/28/2022 with no level obtained since  We will further titrate as needed Zyprexa dosages to 5 mg PO for mild agitation and 10mg PO moderate agitation  Will also decrease PRN hydroxyzine dosages for anxiety to reduce oversedation and c/o feeling "too groggy "  Consider addition of mood stabilizer such as Lamictal to address mood stabilization and depression  No discharge at this time  Recommended Treatment: Continue with group therapy, milieu therapy and occupational therapy  Risks, benefits and possible side effects of Medications:   Risks, benefits, and possible side effects of medications explained to patient and patient verbalizes understanding        Medications:   all current active meds have been reviewed, continue current psychiatric medications and current meds:   Current Facility-Administered Medications   Medication Dose Route Frequency   • acetaminophen (TYLENOL) tablet 650 mg  650 mg Oral Q6H PRN   • acetaminophen (TYLENOL) tablet 650 mg  650 mg Oral Q4H PRN   • acetaminophen (TYLENOL) tablet 975 mg  975 mg Oral Q6H PRN   • albuterol (PROVENTIL HFA,VENTOLIN HFA) inhaler 2 puff  2 puff Inhalation Q4H PRN   • aluminum-magnesium hydroxide-simethicone (MYLANTA) oral suspension 30 mL  30 mL Oral Q4H PRN   • haloperidol lactate (HALDOL) injection 5 mg  5 mg Intramuscular Q4H PRN Max 4/day    And   • LORazepam (ATIVAN) injection 2 mg  2 mg Intramuscular Q4H PRN Max 4/day    And   • benztropine (COGENTIN) injection 1 mg  1 mg Intramuscular Q4H PRN Max 4/day   • benztropine (COGENTIN) tablet 1 mg  1 mg Oral Q6H PRN   • cholecalciferol (VITAMIN D3) tablet 1,000 Units  1,000 Units Oral Daily   • cloZAPine (CLOZARIL) tablet 200 mg  200 mg Oral HS   • cloZAPine (CLOZARIL) tablet 50 mg  50 mg Oral Daily   • cloZAPine (CLOZARIL) tablet 50 mg  50 mg Oral Before Dinner   • hydrOXYzine HCL (ATARAX) tablet 25 mg  25 mg Oral Q6H PRN Max 4/day    Or   • diphenhydrAMINE (BENADRYL) injection 50 mg  50 mg Intramuscular Q6H PRN   • docusate sodium (COLACE) capsule 100 mg  100 mg Oral BID   • fenofibrate (TRICOR) tablet 145 mg  145 mg Oral Daily   • fluticasone (FLONASE) 50 mcg/act nasal spray 1 spray  1 spray Each Nare BID   • hydrocortisone (ANUSOL-HC) 2 5 % rectal cream   Topical 4x Daily PRN   • hydrocortisone 2 5 % cream   Topical 4x Daily PRN   • hydrOXYzine HCL (ATARAX) tablet 10 mg  10 mg Oral Q6H PRN Max 4/day   • hydrOXYzine HCL (ATARAX) tablet 50 mg  50 mg Oral Q6H PRN Max 4/day    Or   • LORazepam (ATIVAN) injection 2 mg  2 mg Intramuscular Q6H PRN   • loratadine (CLARITIN) tablet 10 mg  10 mg Oral Daily   • LORazepam (ATIVAN) tablet 0 5 mg  0 5 mg Oral Daily   • LORazepam (ATIVAN) tablet 1 mg  1 mg Oral HS   • methocarbamol (ROBAXIN) tablet 500 mg  500 mg Oral Q6H PRN   • nicotine (NICODERM CQ) 21 mg/24 hr TD 24 hr patch 1 patch  1 patch Transdermal Daily   • OLANZapine (ZyPREXA) tablet 10 mg  10 mg Oral Q6H PRN Max 3/day   • OLANZapine (ZyPREXA) tablet 5 mg  5 mg Oral Q6H PRN Max 3/day   • pantoprazole (PROTONIX) EC tablet 40 mg  40 mg Oral Early Morning   • polyethylene glycol (MIRALAX) packet 17 g  17 g Oral Daily PRN   • traZODone (DESYREL) tablet 100 mg  100 mg Oral HS PRN   • traZODone (DESYREL) tablet 25 mg  25 mg Oral Q8H PRN   • venlafaxine (EFFEXOR-XR) 24 hr capsule 112 5 mg  112 5 mg Oral Daily     Labs: I have personally reviewed all pertinent laboratory/tests results  CBC:   Lab Results   Component Value Date    WBC 8 81 12/02/2022    RBC 4 68 12/02/2022    HGB 13 4 12/02/2022    HCT 42 5 12/02/2022    MCV 91 12/02/2022     12/02/2022    MCH 28 6 12/02/2022    MCHC 31 5 12/02/2022    RDW 15 1 12/02/2022    MPV 9 4 12/02/2022    NEUTROABS 5 91 12/02/2022     CMP:   Lab Results   Component Value Date    SODIUM 138 12/02/2022    K 3 9 12/02/2022     12/02/2022    CO2 27 12/02/2022    AGAP 7 12/02/2022    BUN 8 12/02/2022    CREATININE 0 66 12/02/2022    GLUC 95 12/02/2022    GLUF 95 12/02/2022    CALCIUM 9 3 12/02/2022    AST 22 12/02/2022    ALT 22 12/02/2022    ALKPHOS 67 12/02/2022    TP 7 0 12/02/2022    ALB 4 2 12/02/2022    TBILI 0 23 12/02/2022    EGFR 102 12/02/2022     Clozapine:   Lab Results   Component Value Date    CLOZAPINE 980 (H) 10/28/2022    NCLOZIP 838 10/28/2022     Counseling / Coordination of Care  Total floor / unit time spent today 30 minutes  Greater than 50% of total time was spent with the patient and / or family counseling and / or coordination of care   A description of the counseling / coordination of care: Medication education, treatment plan, supportive therapy, safety planning

## 2022-12-08 LAB
BASOPHILS # BLD AUTO: 0.03 THOUSANDS/ÂΜL (ref 0–0.1)
BASOPHILS NFR BLD AUTO: 0 % (ref 0–1)
EOSINOPHIL # BLD AUTO: 0.42 THOUSAND/ÂΜL (ref 0–0.61)
EOSINOPHIL NFR BLD AUTO: 5 % (ref 0–6)
ERYTHROCYTE [DISTWIDTH] IN BLOOD BY AUTOMATED COUNT: 14.8 % (ref 11.6–15.1)
HCT VFR BLD AUTO: 42 % (ref 34.8–46.1)
HGB BLD-MCNC: 13.3 G/DL (ref 11.5–15.4)
IMM GRANULOCYTES # BLD AUTO: 0.06 THOUSAND/UL (ref 0–0.2)
IMM GRANULOCYTES NFR BLD AUTO: 1 % (ref 0–2)
LYMPHOCYTES # BLD AUTO: 1.83 THOUSANDS/ÂΜL (ref 0.6–4.47)
LYMPHOCYTES NFR BLD AUTO: 23 % (ref 14–44)
MCH RBC QN AUTO: 28.6 PG (ref 26.8–34.3)
MCHC RBC AUTO-ENTMCNC: 31.7 G/DL (ref 31.4–37.4)
MCV RBC AUTO: 90 FL (ref 82–98)
MONOCYTES # BLD AUTO: 0.64 THOUSAND/ÂΜL (ref 0.17–1.22)
MONOCYTES NFR BLD AUTO: 8 % (ref 4–12)
NEUTROPHILS # BLD AUTO: 5.17 THOUSANDS/ÂΜL (ref 1.85–7.62)
NEUTS SEG NFR BLD AUTO: 63 % (ref 43–75)
NRBC BLD AUTO-RTO: 0 /100 WBCS
PLATELET # BLD AUTO: 256 THOUSANDS/UL (ref 149–390)
PMV BLD AUTO: 9.6 FL (ref 8.9–12.7)
RBC # BLD AUTO: 4.65 MILLION/UL (ref 3.81–5.12)
WBC # BLD AUTO: 8.15 THOUSAND/UL (ref 4.31–10.16)

## 2022-12-08 RX ORDER — POLYETHYLENE GLYCOL 3350 17 G/17G
17 POWDER, FOR SOLUTION ORAL DAILY PRN
Status: DISCONTINUED | OUTPATIENT
Start: 2022-12-08 | End: 2022-12-14 | Stop reason: HOSPADM

## 2022-12-08 RX ORDER — SORBITOL SOLUTION 70 %
30 SOLUTION, ORAL MISCELLANEOUS
Status: DISCONTINUED | OUTPATIENT
Start: 2022-12-08 | End: 2022-12-14 | Stop reason: HOSPADM

## 2022-12-08 RX ADMIN — PANTOPRAZOLE SODIUM 40 MG: 40 TABLET, DELAYED RELEASE ORAL at 08:25

## 2022-12-08 RX ADMIN — LORAZEPAM 1 MG: 1 TABLET ORAL at 21:22

## 2022-12-08 RX ADMIN — CLOZAPINE 50 MG: 25 TABLET ORAL at 08:26

## 2022-12-08 RX ADMIN — LORATADINE 10 MG: 10 TABLET ORAL at 08:26

## 2022-12-08 RX ADMIN — FLUTICASONE PROPIONATE 1 SPRAY: 50 SPRAY, METERED NASAL at 17:22

## 2022-12-08 RX ADMIN — DOCUSATE SODIUM 100 MG: 100 CAPSULE, LIQUID FILLED ORAL at 17:21

## 2022-12-08 RX ADMIN — METHOCARBAMOL 500 MG: 500 TABLET ORAL at 11:16

## 2022-12-08 RX ADMIN — OLANZAPINE 10 MG: 10 TABLET, FILM COATED ORAL at 11:31

## 2022-12-08 RX ADMIN — HYDROCORTISONE 2.5% 1 APPLICATION: 25 CREAM TOPICAL at 15:00

## 2022-12-08 RX ADMIN — FLUTICASONE PROPIONATE 1 SPRAY: 50 SPRAY, METERED NASAL at 08:27

## 2022-12-08 RX ADMIN — DOCUSATE SODIUM 100 MG: 100 CAPSULE, LIQUID FILLED ORAL at 08:26

## 2022-12-08 RX ADMIN — HYDROCORTISONE 1 APPLICATION: 25 CREAM TOPICAL at 15:00

## 2022-12-08 RX ADMIN — Medication 1000 UNITS: at 08:26

## 2022-12-08 RX ADMIN — HYDROXYZINE HYDROCHLORIDE 10 MG: 10 TABLET ORAL at 11:32

## 2022-12-08 RX ADMIN — VENLAFAXINE HYDROCHLORIDE 112.5 MG: 75 CAPSULE, EXTENDED RELEASE ORAL at 08:26

## 2022-12-08 RX ADMIN — LORAZEPAM 0.5 MG: 0.5 TABLET ORAL at 08:26

## 2022-12-08 RX ADMIN — NICOTINE 1 PATCH: 21 PATCH, EXTENDED RELEASE TRANSDERMAL at 08:31

## 2022-12-08 RX ADMIN — METHOCARBAMOL 500 MG: 500 TABLET ORAL at 21:23

## 2022-12-08 RX ADMIN — FENOFIBRATE 145 MG: 145 TABLET, COATED ORAL at 08:26

## 2022-12-08 RX ADMIN — CLOZAPINE 200 MG: 100 TABLET ORAL at 21:22

## 2022-12-08 RX ADMIN — OLANZAPINE 10 MG: 10 TABLET, FILM COATED ORAL at 17:22

## 2022-12-08 RX ADMIN — CLOZAPINE 50 MG: 25 TABLET ORAL at 15:52

## 2022-12-08 RX ADMIN — DOCUSATE SODIUM AND SENNOSIDES 1 TABLET: 8.6; 5 TABLET ORAL at 21:22

## 2022-12-08 NOTE — NURSING NOTE
Patient visible on unit at times  Patient spends a lot of time in her room  Patient calm and cooperative  Medication compliant  Patient SI,HI  Patient does endorse AVH, anxiety and depression    Patient did not require prns at this time  Patient did ask for Robaxin at 2030 with positive results  Safety checks continue Q 7 minutes

## 2022-12-08 NOTE — PLAN OF CARE
Problem: Alteration in Thoughts and Perception  Goal: Treatment Goal: Gain control of psychotic behaviors/thinking, reduce/eliminate presenting symptoms and demonstrate improved reality functioning upon discharge  Outcome: Progressing  Goal: Verbalize thoughts and feelings  Description: Interventions:  - Promote a nonjudgmental and trusting relationship with the patient through active listening and therapeutic communication  - Assess patient's level of functioning, behavior and potential for risk  - Engage patient in 1 on 1 interactions  - Encourage patient to express fears, feelings, frustrations, and discuss symptoms    - [place] patient to reality, help patient recognize reality-based thinking   - Administer medications as ordered and assess for potential side effects  - Provide the patient education related to the signs and symptoms of the illness and desired effects of prescribed medications  Outcome: Progressing  Goal: Refrain from acting on delusional thinking/internal stimuli  Description: Interventions:  - Monitor patient closely, per order   - Utilize least restrictive measures   - Set reasonable limits, give positive feedback for acceptable   - Administer medications as ordered and monitor of potential side effects  Outcome: Progressing     Problem: Ineffective Coping  Goal: Cooperates with admission process  Description: Interventions:   - Complete admission process  Outcome: Progressing  Goal: Identifies healthy coping skills  Outcome: Not Progressing  Goal: Demonstrates healthy coping skills  Outcome: Progressing  Goal: Participates in unit activities  Description: Interventions:  - Provide therapeutic environment   - Provide required programming   - Redirect inappropriate behaviors   Outcome: Not Progressing     Problem: Risk for Self Injury/Neglect  Goal: Treatment Goal: Remain safe during length of stay, learn and adopt new coping skills, and be free of self-injurious ideation, impulses and acts at the time of discharge  Outcome: Progressing  Goal: Refrain from harming self  Description: Interventions:  - Monitor patient closely, per order  - Develop a trusting relationship  - Supervise medication ingestion, monitor effects and side effects   Outcome: Progressing     Problem: Depression  Goal: Treatment Goal: Demonstrate behavioral control of depressive symptoms, verbalize feelings of improved mood/affect, and adopt new coping skills prior to discharge  Outcome: Progressing  Goal: Refrain from isolation  Description: Interventions:  - Develop a trusting relationship   - Encourage socialization   Outcome: Progressing  Goal: Refrain from self-neglect  Outcome: Progressing  Goal: Complete daily ADLs, including personal hygiene independently, as able  Description: Interventions:  - Observe, teach, and assist patient with ADLS  -  Monitor and promote a balance of rest/activity, with adequate nutrition and elimination   Outcome: Progressing     Problem: Anxiety  Goal: Anxiety is at manageable level  Description: Interventions:  - Assess and monitor patient's anxiety level  - Monitor for signs and symptoms (heart palpitations, chest pain, shortness of breath, headaches, nausea, feeling jumpy, restlessness, irritable, apprehensive)  - Collaborate with interdisciplinary team and initiate plan and interventions as ordered  - [place] patient to unit/surroundings  - Explain treatment plan  - Encourage participation in care  - Encourage verbalization of concerns/fears  - Identify coping mechanisms  - Assist in developing anxiety-reducing skills  - Administer/offer alternative therapies  - Limit or eliminate stimulants  Outcome: Progressing     Problem: Anxiety  Goal: Anxiety is at manageable level  Description: Interventions:  - Assess and monitor patient's anxiety level     - Monitor for signs and symptoms (heart palpitations, chest pain, shortness of breath, headaches, nausea, feeling jumpy, restlessness, irritable, apprehensive)  - Collaborate with interdisciplinary team and initiate plan and interventions as ordered    - [place] patient to unit/surroundings  - Explain treatment plan  - Encourage participation in care  - Encourage verbalization of concerns/fears  - Identify coping mechanisms  - Assist in developing anxiety-reducing skills  - Administer/offer alternative therapies  - Limit or eliminate stimulants  Outcome: Progressing     Problem: Ineffective Coping  Goal: Participates in unit activities  Description: Interventions:  - Provide therapeutic environment   - Provide required programming   - Redirect inappropriate behaviors   Outcome: Progressing     Problem: DISCHARGE PLANNING - CARE MANAGEMENT  Goal: Discharge to post-acute care or home with appropriate resources  Description: INTERVENTIONS:  - Conduct assessment to determine patient/family and health care team treatment goals, and need for post-acute services based on payer coverage, community resources, and patient preferences, and barriers to discharge  - Address psychosocial, clinical, and financial barriers to discharge as identified in assessment in conjunction with the patient/family and health care team  - Arrange appropriate level of post-acute services according to patient’s   needs and preference and payer coverage in collaboration with the physician and health care team  - Communicate with and update the patient/family, physician, and health care team regarding progress on the discharge plan  - Arrange appropriate transportation to post-acute venues  Outcome: Progressing

## 2022-12-08 NOTE — PROGRESS NOTES
12/08/22 0730   Activity/Group Checklist   Group   (Morning Community Meeting/Daily Check-In)   Attendance Attended   Attendance Duration (min) 46-60   Interactions Interacted appropriately   Affect/Mood Appropriate;Calm   Goals Achieved Identified feelings; Discussed coping strategies; Able to listen to others; Able to engage in interactions; Able to reflect/comment on own behavior;Able to manage/cope with feelings

## 2022-12-08 NOTE — NURSING NOTE
Patient given PRN zyprexa 10mg  Patient reports feeling less agitated however reports AH are heightened  Reports she is hearing voices from the vent and window  Assured patient voices are not there and she states "I know most of them arnt real, but I think some of them are " Patient also reports feeling paranoid and states "I feel there are people after me, I am having a hard time feeling safe, I feel scared " Support and reassurance provided  Patient encouraged to walk in hallway and spend some time in dining room, she agreed    Patient spoke about her illness getting worse and states "Alice Bryan been dealing with this for 12 yrs, but lately its really bad "

## 2022-12-08 NOTE — PROGRESS NOTES
Progress Note - Derick Islas 47 y o  female MRN: [id number]    Unit/Bed#: [facility] 247-01 Encounter: [id number]        Subjective:   Patient seen and examined at bedside after reviewing the chart and discussing the case with the caring staff  Patient examined at bedside  Patient reports no acute symptoms  Physical Exam   Vitals: Blood pressure 119/81, pulse 104, temperature 97 5 °F (36 4 °C), temperature source Temporal, resp  rate 18, height 5' 1" (1 549 m), weight 70 3 kg (155 lb), SpO2 93 %, not currently breastfeeding  ,Body mass index is 29 29 kg/m²  Constitutional: Patient in no acute distress  HEENT: PERR, EOMI, MMM  Cardiovascular: Normal rate and regular rhythm  Pulmonary/Chest: Effort normal and breath sounds normal    Abdomen: Nondistended  Assessment/Plan:  Derick Islas is a 47y o  year old female with schizoaffective disorder      1  GERD   Continue Protonix 40 mg daily, Mylanta as needed  2  Tobacco abuse   Patient is on nicotine transdermal patch 21 mg/24 hr   3  Hyperlipidemia   Continue fenofibrate 145 mg daily  4  Constipation   Start Colace 100 mg twice daily  Patient may take MiraLax as needed  5  Asthma   Patient may use albuterol inhaler as needed      6  Allergic rhinitis  Patient is on Claritin 10 mg daily and Flonase nasal spray twice daily  7  Vitamin-D deficiency  Continue vitamin D3 1000 units daily     8  Hemorrhoids   Patient may use Anusol cream as needed  9  Concern for STI  HIV pending  CT/GC negative, RPR nonreactive  The patient was discussed with Dr Nallely Stevenson and he is in agreement with the above note

## 2022-12-08 NOTE — PROGRESS NOTES
12/08/22 1000   Activity/Group Checklist   Group   (Identifying Our Healthy Coping Mechanisms Art Therapy)   Attendance Attended   Attendance Duration (min) Greater than 60   Interactions Interacted appropriately   Affect/Mood Appropriate;Calm   Goals Achieved Identified feelings; Identified relapse prevention strategies; Discussed coping strategies; Able to listen to others; Able to engage in interactions; Able to reflect/comment on own behavior;Able to manage/cope with feelings; Identified resources and support systems; Able to self-disclose; Able to give feedback to another;Able to recieve feedback

## 2022-12-08 NOTE — NURSING NOTE
Patient visible and cooperative on unit  Reports increased depression and anxiety  Upon approach in the morning patient states "I feel grumpy today, just tired of it " Patient reports she just wants to get stable again  Support provided  Patient denies suicidal ideations, and reports feeling safe on unit  Patient did request and receive PRN zyprexa before lunch, which had good effect  Attending groups  Completing ADLS  Eating meals  Patient more engaged this afternoon and brighter  Patient currently denies concerns  Staff availability reinforced  Safety checks maintained
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
## 2022-12-08 NOTE — PROGRESS NOTES
12/08/22   Team Meeting   Meeting Type Daily Rounds   Team Members Present   Team Members Present Physician;;Nurse   Physician Team Member Dr Gonzalez Wallace MD; HOSP DR RADHA SILVA, [address] [place] [address]   Nursing Team Member Samere Treadwell RN; Clifford Mendez RN   Social Work Team Member [place], [place]   Patient/Family Present   Patient Present No   Patient's Family Present No     Possible ECT, PRN effective, slept, distressed, hallucinations are worsening, mood stabilizer may be added, paranoid, Clozaril level due today, pt endorses dep

## 2022-12-08 NOTE — SOCIAL WORK
Sw met with pt in pt's room for check in  Pt states she attended half of mornings group, but had to leave and return to her bed to calm down, relax  Pt appears distressed, sad over current hallucinations  Sw provided support, reassurance

## 2022-12-08 NOTE — NURSING NOTE
Patient given PRN zyprexa 10mg and atarax 10mg at 1130 for increased anxiety and agitation r/t auditory hallucinations  Patient reports PRNS were effective   Patient states "they helped a lot, thankyou so much, I feel a lot better "

## 2022-12-08 NOTE — PROGRESS NOTES
12/08/22 1300   Activity/Group Checklist   Group   (Pet Therapy and Art Therapy [employer])   Attendance Attended   Attendance Duration (min) 46-60   Interactions Interacted appropriately   Affect/Mood Appropriate;Bright;Calm   Goals Achieved Identified feelings; Discussed coping strategies; Increased hopefulness

## 2022-12-09 LAB
HIV1 RNA # SERPL NAA+PROBE: <20 COPIES/ML
HIV1 RNA SERPL NAA+PROBE-LOG#: NORMAL LOG10COPY/ML

## 2022-12-09 RX ORDER — LIDOCAINE 50 MG/G
1 PATCH TOPICAL DAILY
Status: DISCONTINUED | OUTPATIENT
Start: 2022-12-09 | End: 2022-12-14 | Stop reason: HOSPADM

## 2022-12-09 RX ADMIN — CLOZAPINE 50 MG: 25 TABLET ORAL at 15:47

## 2022-12-09 RX ADMIN — TRAZODONE HYDROCHLORIDE 100 MG: 100 TABLET ORAL at 22:10

## 2022-12-09 RX ADMIN — CLOZAPINE 50 MG: 25 TABLET ORAL at 09:42

## 2022-12-09 RX ADMIN — NICOTINE 1 PATCH: 21 PATCH, EXTENDED RELEASE TRANSDERMAL at 08:31

## 2022-12-09 RX ADMIN — LORAZEPAM 0.5 MG: 0.5 TABLET ORAL at 08:29

## 2022-12-09 RX ADMIN — PANTOPRAZOLE SODIUM 40 MG: 40 TABLET, DELAYED RELEASE ORAL at 08:29

## 2022-12-09 RX ADMIN — HYDROXYZINE HYDROCHLORIDE 50 MG: 50 TABLET, FILM COATED ORAL at 18:11

## 2022-12-09 RX ADMIN — VENLAFAXINE HYDROCHLORIDE 112.5 MG: 75 CAPSULE, EXTENDED RELEASE ORAL at 08:29

## 2022-12-09 RX ADMIN — FENOFIBRATE 145 MG: 145 TABLET, COATED ORAL at 08:29

## 2022-12-09 RX ADMIN — BENZTROPINE MESYLATE 1 MG: 1 TABLET ORAL at 16:11

## 2022-12-09 RX ADMIN — DOCUSATE SODIUM 100 MG: 100 CAPSULE, LIQUID FILLED ORAL at 18:10

## 2022-12-09 RX ADMIN — LORATADINE 10 MG: 10 TABLET ORAL at 08:29

## 2022-12-09 RX ADMIN — LORAZEPAM 1 MG: 1 TABLET ORAL at 20:54

## 2022-12-09 RX ADMIN — OLANZAPINE 5 MG: 5 TABLET, FILM COATED ORAL at 20:04

## 2022-12-09 RX ADMIN — METHOCARBAMOL 500 MG: 500 TABLET ORAL at 16:11

## 2022-12-09 RX ADMIN — DOCUSATE SODIUM 100 MG: 100 CAPSULE, LIQUID FILLED ORAL at 08:29

## 2022-12-09 RX ADMIN — FLUTICASONE PROPIONATE 1 SPRAY: 50 SPRAY, METERED NASAL at 08:30

## 2022-12-09 RX ADMIN — FLUTICASONE PROPIONATE 1 SPRAY: 50 SPRAY, METERED NASAL at 18:10

## 2022-12-09 RX ADMIN — CLOZAPINE 200 MG: 100 TABLET ORAL at 20:53

## 2022-12-09 RX ADMIN — OLANZAPINE 10 MG: 10 TABLET, FILM COATED ORAL at 15:48

## 2022-12-09 RX ADMIN — DOCUSATE SODIUM AND SENNOSIDES 1 TABLET: 8.6; 5 TABLET ORAL at 20:53

## 2022-12-09 RX ADMIN — LIDOCAINE 1 PATCH: 50 PATCH TOPICAL at 14:30

## 2022-12-09 RX ADMIN — Medication 1000 UNITS: at 08:29

## 2022-12-09 NOTE — NURSING NOTE
Patient was pleasant and cooperative  Patient reported voices throughout the shift  Staff support provided  Q 7 minute safety checks maintained  Patient denied SI/HI but did report hearing and seeing things  Patient compliant with medications and select groups  Patient continues to report distress on and off through out the shift but also states the PRNs are effective  Staff will continue to monitor and support

## 2022-12-09 NOTE — SOCIAL WORK
[facility] ACT Nurse Will Zaman left catina  [phone], [phone]  Catina called [place] back regarding pts progress, ECT, medication mgmt  Call ended mutually

## 2022-12-09 NOTE — PROGRESS NOTES
12/09/22 1000   Activity/Group Checklist   Group   (Creative Expression Art Therapy)   Attendance Attended   Attendance Duration (min) Greater than 60   Interactions Interacted appropriately   Affect/Mood Appropriate   Goals Achieved Identified feelings; Identified triggers; Discussed coping strategies; Able to listen to others; Able to engage in interactions; Able to manage/cope with feelings; Able to reflect/comment on own behavior

## 2022-12-09 NOTE — PROGRESS NOTES
Patient slept throughout the night without interruption, non labored breathing noted  Prn Robaxin 500 mg administered at 2123 for c/o muscle spasms to b/l lower extremities w/ positive effect  Q 7 min safety checks maintained

## 2022-12-09 NOTE — PROGRESS NOTES
12/09/22   Team Meeting   Meeting Type Daily Rounds   Team Members Present   Team Members Present ;Physician;Nurse   Physician Team Member Dr Vargas burnham DO; HOSP DR RADHA SILVA, [address] [place] [address]   Nursing Team Member Zoila Logan RN; Eva Mcgregor, RN   Social Work Team Member Viki Handy [place]   Patient/Family Present   Patient Present No   Patient's Family Present No     ECT possible, neuropsych/medical clearance needed

## 2022-12-09 NOTE — PROGRESS NOTES
Progress Note - Behavioral Health   Fabiola Sethi 47 y o  female MRN: [id number]  Unit/Bed#: [facility] 247-01 Encounter: [id number]    Assessment/Plan   Principal Problem:    Schizoaffective disorder, bipolar type ([address] )      Behavior over the last 24 hours: Some improvement  Sleep: normal  Appetite: Fair  Medication side effects: No  ROS: no complaints and all other systems are negative    Jackelyn Masterson was seen this morning for psychiatric follow-up  She was pleasant, calm, and cooperative  Reports decrease in auditory hallucinations  States she feels uneasy and "waiting for the next psychotic attack to happen "  Responded well to verbal redirection and support provided to patient  She denies SI/HI/VH  She expressed hope regarding ECT treatment  Has been compliant with medications and meals  Denies any sleep disturbance  Often requires PRNs for AH and anxiety  Mental Status Evaluation:  Appearance:  age appropriate and disheveled   Behavior:  Cooperative, good eye contact   Speech:  normal pitch and normal volume   Mood:  anxious and dysthymic, depressed   Affect:  constricted and mood-congruent   Thought Process:  circumstantial   Associations: circumstantial associations   Thought Content:  Paranoid, ruminating thoughts   Perceptual Disturbances: Intermittent AH, commanding in nature   Denies VH   Risk Potential: Suicidal Ideations none  Homicidal Ideations none  Potential for Aggression No   Sensorium:  person, place, time/date and situation   Memory:  recent and remote memory grossly intact, occasional forgetfulness   Consciousness:  alert and awake    Attention: attention span and concentration were age appropriate   Insight:  Impaired   Judgment: limited   Gait/Station: normal gait/station and normal balance   Motor Activity: no abnormal movements     Progress Toward Goals: Some improvement  Reports slight decrease in AH  Depression and anxiety persists  Often receives PRN for ongoing AH and anxiety   Hopeful regarding ECT  Awaiting medical clearance for ECT  Anticipated transfer to [facility] early next week to begin ECT treatment  For now, we will continue with current psychotropic regimen; patient tolerating well and awaiting Clozaril level  Recommended Treatment: Continue with group therapy, milieu therapy and occupational therapy  Risks, benefits and possible side effects of Medications:   Risks, benefits, and possible side effects of medications explained to patient and patient verbalizes understanding        Medications:   all current active meds have been reviewed, continue current psychiatric medications and current meds:   Current Facility-Administered Medications   Medication Dose Route Frequency   • acetaminophen (TYLENOL) tablet 650 mg  650 mg Oral Q6H PRN   • acetaminophen (TYLENOL) tablet 650 mg  650 mg Oral Q4H PRN   • acetaminophen (TYLENOL) tablet 975 mg  975 mg Oral Q6H PRN   • albuterol (PROVENTIL HFA,VENTOLIN HFA) inhaler 2 puff  2 puff Inhalation Q4H PRN   • aluminum-magnesium hydroxide-simethicone (MYLANTA) oral suspension 30 mL  30 mL Oral Q4H PRN   • haloperidol lactate (HALDOL) injection 5 mg  5 mg Intramuscular Q4H PRN Max 4/day    And   • LORazepam (ATIVAN) injection 2 mg  2 mg Intramuscular Q4H PRN Max 4/day    And   • benztropine (COGENTIN) injection 1 mg  1 mg Intramuscular Q4H PRN Max 4/day   • benztropine (COGENTIN) tablet 1 mg  1 mg Oral Q6H PRN   • bisacodyl (DULCOLAX) rectal suppository 10 mg  10 mg Rectal Daily PRN   • cholecalciferol (VITAMIN D3) tablet 1,000 Units  1,000 Units Oral Daily   • cloZAPine (CLOZARIL) tablet 200 mg  200 mg Oral HS   • cloZAPine (CLOZARIL) tablet 50 mg  50 mg Oral Daily   • cloZAPine (CLOZARIL) tablet 50 mg  50 mg Oral Before Dinner   • hydrOXYzine HCL (ATARAX) tablet 25 mg  25 mg Oral Q6H PRN Max 4/day    Or   • diphenhydrAMINE (BENADRYL) injection 50 mg  50 mg Intramuscular Q6H PRN   • docusate sodium (COLACE) capsule 100 mg  100 mg Oral BID   • fenofibrate (TRICOR) tablet 145 mg  145 mg Oral Daily   • fluticasone (FLONASE) 50 mcg/act nasal spray 1 spray  1 spray Each Nare BID   • hydrocortisone (ANUSOL-HC) 2 5 % rectal cream   Topical 4x Daily PRN   • hydrocortisone 2 5 % cream   Topical 4x Daily PRN   • hydrOXYzine HCL (ATARAX) tablet 10 mg  10 mg Oral Q6H PRN Max 4/day   • hydrOXYzine HCL (ATARAX) tablet 50 mg  50 mg Oral Q6H PRN Max 4/day    Or   • LORazepam (ATIVAN) injection 2 mg  2 mg Intramuscular Q6H PRN   • lidocaine (LIDODERM) 5 % patch 1 patch  1 patch Topical Daily   • loratadine (CLARITIN) tablet 10 mg  10 mg Oral Daily   • LORazepam (ATIVAN) tablet 0 5 mg  0 5 mg Oral Daily   • LORazepam (ATIVAN) tablet 1 mg  1 mg Oral HS   • methocarbamol (ROBAXIN) tablet 500 mg  500 mg Oral Q6H PRN   • nicotine (NICODERM CQ) 21 mg/24 hr TD 24 hr patch 1 patch  1 patch Transdermal Daily   • OLANZapine (ZyPREXA) tablet 10 mg  10 mg Oral Q6H PRN Max 3/day   • OLANZapine (ZyPREXA) tablet 5 mg  5 mg Oral Q6H PRN Max 3/day   • pantoprazole (PROTONIX) EC tablet 40 mg  40 mg Oral Early Morning   • polyethylene glycol (MIRALAX) packet 17 g  17 g Oral Daily PRN   • senna-docusate sodium (SENOKOT S) 8 6-50 mg per tablet 1 tablet  1 tablet Oral HS   • sorbitol 70 % solution 30 mL  30 mL Oral Q1H PRN   • traZODone (DESYREL) tablet 100 mg  100 mg Oral HS PRN   • traZODone (DESYREL) tablet 25 mg  25 mg Oral Q8H PRN   • venlafaxine (EFFEXOR-XR) 24 hr capsule 112 5 mg  112 5 mg Oral Daily     Labs: I have personally reviewed all pertinent laboratory/tests results     CBC:   Lab Results   Component Value Date    WBC 8 15 12/08/2022    RBC 4 65 12/08/2022    HGB 13 3 12/08/2022    HCT 42 0 12/08/2022    MCV 90 12/08/2022     12/08/2022    MCH 28 6 12/08/2022    MCHC 31 7 12/08/2022    RDW 14 8 12/08/2022    MPV 9 6 12/08/2022    NEUTROABS 5 17 12/08/2022     CMP:   Lab Results   Component Value Date    SODIUM 138 12/02/2022    K 3 9 12/02/2022     12/02/2022    CO2 27 12/02/2022    AGAP 7 12/02/2022    BUN 8 12/02/2022    CREATININE 0 66 12/02/2022    GLUC 95 12/02/2022    GLUF 95 12/02/2022    CALCIUM 9 3 12/02/2022    AST 22 12/02/2022    ALT 22 12/02/2022    ALKPHOS 67 12/02/2022    TP 7 0 12/02/2022    ALB 4 2 12/02/2022    TBILI 0 23 12/02/2022    EGFR 102 12/02/2022     Counseling / Coordination of Care  Total floor / unit time spent today 30 minutes  Greater than 50% of total time was spent with the patient and / or family counseling and / or coordination of care   A description of the counseling / coordination of care: Medication education, treatment plan, supportive therapy

## 2022-12-09 NOTE — NUTRITION
12/09/22 1459   Biochemical Data,Medical Tests, and Procedures   Biochemical Data/Medical Tests/Procedures Lab values reviewed; Meds reviewed   Labs (Comment) 12/2 CMP WNL, CHOL:200, LDL:124  12/8 CBC WNL   Meds (Comment) cogentin, Vit D, clozaril, colace, tricor, haldol, zyprexa, protonix  desyrel   Nutrition-Focused Physical Exam   Nutrition-Focused Physical Exam Findings RN skin assessment reviewed; No skin issues documented   Nutrition-Focused Physical Exam Findings smoker   Medical-Related Concerns drug addiction, anxiety, anorexia nervosa, dyslipidemia, pancreatitis, schizophrienia, PTSD, suicide attempt  Adequacy of Intake   Nutrition Modality PO   Feeding Route   PO Independent   Current PO Intake   Current Diet Order Regular diet thin liquids   Current Meal Intake 50-75%;%   Estimated calorie intake compared to estimated need Nutrient needs met  PES Statement   Problem No nutrition diagnosis   Recommendations/Interventions   Malnutrition/BMI Present No  (does not meet criteria)   Summary Length of stay  Regular diet thin liquids  Meal completions mostly 100%  Patient states her appetite is good  She states she resides at a group home  She states she consumes 3 meals per day prepared by her facility  She states she does not follow a diet plan  She has been receiving prune juice at breakfast and dinner  12/3/#; 11/7/#; 8/11/#; 7/18/#, 10#(6%) loss  No significant weight changes  She states she is happy about her weight loss  Skin intact     Interventions/Recommendations Continue current diet order   Education Assessment   Education Education not indicated at this time   Nutrition Complexity Risk   Nutrition complexity level Low risk   Follow up date 12/23/22

## 2022-12-09 NOTE — PLAN OF CARE
Problem: Alteration in Thoughts and Perception  Goal: Verbalize thoughts and feelings  Description: Interventions:  - Promote a nonjudgmental and trusting relationship with the patient through active listening and therapeutic communication  - Assess patient's level of functioning, behavior and potential for risk  - Engage patient in 1 on 1 interactions  - Encourage patient to express fears, feelings, frustrations, and discuss symptoms    - Independence patient to reality, help patient recognize reality-based thinking   - Administer medications as ordered and assess for potential side effects  - Provide the patient education related to the signs and symptoms of the illness and desired effects of prescribed medications  Outcome: Progressing  Goal: Refrain from acting on delusional thinking/internal stimuli  Description: Interventions:  - Monitor patient closely, per order   - Utilize least restrictive measures   - Set reasonable limits, give positive feedback for acceptable   - Administer medications as ordered and monitor of potential side effects  Outcome: Progressing     Problem: Ineffective Coping  Goal: Cooperates with admission process  Description: Interventions:   - Complete admission process  Outcome: Progressing  Goal: Identifies healthy coping skills  Outcome: Not Progressing  Goal: Demonstrates healthy coping skills  Outcome: Progressing  Goal: Participates in unit activities  Description: Interventions:  - Provide therapeutic environment   - Provide required programming   - Redirect inappropriate behaviors   Outcome: Not Progressing   See progress note

## 2022-12-09 NOTE — NURSING NOTE
Patient given zyprexa 10mg for moderate agitation  Patient was calmer and reported positive effect from PRN

## 2022-12-09 NOTE — PROGRESS NOTES
Pt visible and cooperative on the unit  Endorses AH to previous nurse earlier in the evening and denies any further occurrences of AH in the later evening  Pt stated " I don't feel so well, I just want my sanity you know?" This writer listened attentiveley and provided reassurance  Pt also reported to this writer discussions for potential ECT treatment and indicated it was effective the last time she received this treatment  Pt also endorsed anxiety as manageable at the moment  Denies SI  Compliant w/ medications  Given prn Robaxin 500 mg at 2123 for c/o muscle spasms to her b/l lower extremities w/ positive effect  Offers no further complaints at this time  Q 7 min safety checks maintained
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
## 2022-12-09 NOTE — SOCIAL WORK
Per provider written approval, sw faxed pt's requested documents (social security card, birth certificate, photo ID) to social security office requesting new [employer] card  [phone]

## 2022-12-10 PROBLEM — G31.84 MILD NEUROCOGNITIVE DISORDER: Status: ACTIVE | Noted: 2022-12-10

## 2022-12-10 RX ADMIN — CLOZAPINE 50 MG: 25 TABLET ORAL at 15:16

## 2022-12-10 RX ADMIN — DOCUSATE SODIUM 100 MG: 100 CAPSULE, LIQUID FILLED ORAL at 17:32

## 2022-12-10 RX ADMIN — OLANZAPINE 10 MG: 10 TABLET, FILM COATED ORAL at 15:16

## 2022-12-10 RX ADMIN — FENOFIBRATE 145 MG: 145 TABLET, COATED ORAL at 09:29

## 2022-12-10 RX ADMIN — CLOZAPINE 50 MG: 25 TABLET ORAL at 09:28

## 2022-12-10 RX ADMIN — HYDROXYZINE HYDROCHLORIDE 25 MG: 25 TABLET, FILM COATED ORAL at 15:16

## 2022-12-10 RX ADMIN — VENLAFAXINE HYDROCHLORIDE 112.5 MG: 75 CAPSULE, EXTENDED RELEASE ORAL at 09:28

## 2022-12-10 RX ADMIN — DOCUSATE SODIUM 100 MG: 100 CAPSULE, LIQUID FILLED ORAL at 09:28

## 2022-12-10 RX ADMIN — TRAZODONE HYDROCHLORIDE 100 MG: 100 TABLET ORAL at 20:54

## 2022-12-10 RX ADMIN — Medication 1000 UNITS: at 09:29

## 2022-12-10 RX ADMIN — LIDOCAINE 1 PATCH: 50 PATCH TOPICAL at 09:28

## 2022-12-10 RX ADMIN — OLANZAPINE 5 MG: 5 TABLET, FILM COATED ORAL at 11:59

## 2022-12-10 RX ADMIN — CLOZAPINE 200 MG: 100 TABLET ORAL at 20:47

## 2022-12-10 RX ADMIN — HYDROXYZINE HYDROCHLORIDE 50 MG: 50 TABLET, FILM COATED ORAL at 11:59

## 2022-12-10 RX ADMIN — FLUTICASONE PROPIONATE 1 SPRAY: 50 SPRAY, METERED NASAL at 09:29

## 2022-12-10 RX ADMIN — LORAZEPAM 1 MG: 1 TABLET ORAL at 20:48

## 2022-12-10 RX ADMIN — DOCUSATE SODIUM AND SENNOSIDES 1 TABLET: 8.6; 5 TABLET ORAL at 20:48

## 2022-12-10 RX ADMIN — METHOCARBAMOL 500 MG: 500 TABLET ORAL at 20:49

## 2022-12-10 RX ADMIN — LORAZEPAM 0.5 MG: 0.5 TABLET ORAL at 09:29

## 2022-12-10 RX ADMIN — LORATADINE 10 MG: 10 TABLET ORAL at 09:28

## 2022-12-10 RX ADMIN — NICOTINE 1 PATCH: 21 PATCH, EXTENDED RELEASE TRANSDERMAL at 09:27

## 2022-12-10 NOTE — PLAN OF CARE
Problem: Alteration in Thoughts and Perception  Goal: Verbalize thoughts and feelings  Description: Interventions:  - Promote a nonjudgmental and trusting relationship with the patient through active listening and therapeutic communication  - Assess patient's level of functioning, behavior and potential for risk  - Engage patient in 1 on 1 interactions  - Encourage patient to express fears, feelings, frustrations, and discuss symptoms    - [place] patient to reality, help patient recognize reality-based thinking   - Administer medications as ordered and assess for potential side effects  - Provide the patient education related to the signs and symptoms of the illness and desired effects of prescribed medications  Outcome: Progressing     Problem: Risk for Self Injury/Neglect  Goal: Refrain from harming self  Description: Interventions:  - Monitor patient closely, per order  - Develop a trusting relationship  - Supervise medication ingestion, monitor effects and side effects   Outcome: Progressing

## 2022-12-10 NOTE — NURSING NOTE
Patient compliant with meds and meals  Patient has compliant of AH and states people wont leave her alone and are forcing her to eat when she wants to diet and they are laughing at her patient became upset at these and was given 5mg zyprexa and 50mg of atarax at 1159 which was mildly effective then at 1556 patient received 10mg zyprexa and 25mg atarax which was effective  Patient expressive great frustration and states she is tortured by these voices  Patient is pleasant and cooperative, visible and social on the unit at times  Q 7 min behavioral and safety checks in place

## 2022-12-10 NOTE — PROGRESS NOTES
Progress Note - Derick Islas 47 y o  female MRN: [id number]    Unit/Bed#: [facility] 247-01 Encounter: [id number]        Subjective:   Patient seen and examined at bedside after reviewing the chart and discussing the case with the caring staff  Patient examined at bedside  Patient complaining of chronic low back pain and requesting lidocaine patch  She otherwise denies any complaints  Physical Exam   Vitals: Blood pressure 105/83, pulse 95, temperature 97 8 °F (36 6 °C), temperature source Temporal, resp  rate 18, height 5' 1" (1 549 m), weight 70 3 kg (155 lb), SpO2 95 %, not currently breastfeeding  ,Body mass index is 29 29 kg/m²  Constitutional: Patient in no acute distress  HEENT: PERR, EOMI, MMM  Cardiovascular: Normal rate and regular rhythm  Pulmonary/Chest: Effort normal and breath sounds normal    Abdomen: Nondistended, +BS, soft, NT  Assessment/Plan:  Derick Islas is a 47y o  year old female with schizoaffective disorder      1  GERD   Continue Protonix 40 mg daily, Mylanta as needed  2  Tobacco abuse   Patient is on nicotine transdermal patch 21 mg/24 hr   3  Hyperlipidemia   Continue fenofibrate 145 mg daily  4  Constipation   Start Colace 100 mg twice daily  Patient may take MiraLax as needed  5  Asthma   Patient may use albuterol inhaler as needed      6  Allergic rhinitis  Patient is on Claritin 10 mg daily and Flonase nasal spray twice daily  7  Vitamin-D deficiency  Continue vitamin D3 1000 units daily     8  Hemorrhoids   Patient may use Anusol cream as needed  9  Concern for STI  HIV and CT/GC negative, RPR nonreactive  10  Chronic low back pain  Tylenol as needed  Add lidocaine patch daily  The patient was discussed with Dr Nallely Stevenson and he is in agreement with the above note

## 2022-12-10 NOTE — PROGRESS NOTES
Progress Note - Marisabel Beltrán 47 y o  female MRN: [id number]    Unit/Bed#: [facility] 247-01 Encounter: [id number]        Subjective:   Patient seen and examined at bedside after reviewing the chart and discussing the case with the caring staff  Patient examined at bedside  Patient reports no acute symptoms  Physical Exam   Vitals: Blood pressure 120/78, pulse 100, temperature 97 6 °F (36 4 °C), temperature source Temporal, resp  rate 18, height 5' 1" (1 549 m), weight 71 2 kg (157 lb), SpO2 94 %, not currently breastfeeding  ,Body mass index is 29 66 kg/m²  Constitutional: Patient in no acute distress  HEENT: PERR, EOMI, MMM  Cardiovascular: Normal rate and regular rhythm  Pulmonary/Chest: Effort normal and breath sounds normal    Abdomen: Nondistended, +BS, soft, NT  Assessment/Plan:  Marisabel Beltrán is a 47y o  year old female with schizoaffective disorder      1  GERD   Continue Protonix 40 mg daily, Mylanta as needed  2  Tobacco abuse   Patient is on nicotine transdermal patch 21 mg/24 hr   3  Hyperlipidemia   Continue fenofibrate 145 mg daily  4  Constipation   Start Colace 100 mg twice daily  Patient may take MiraLax as needed  5  Asthma   Patient may use albuterol inhaler as needed      6  Allergic rhinitis  Patient is on Claritin 10 mg daily and Flonase nasal spray twice daily  7  Vitamin-D deficiency  Continue vitamin D3 1000 units daily     8  Hemorrhoids   Patient may use Anusol cream as needed  9  Concern for STI  HIV and CT/GC negative, RPR nonreactive  10  Chronic low back pain  Tylenol as needed  Add lidocaine patch daily

## 2022-12-10 NOTE — NURSING NOTE
Patient requested PRN trazodone for sleep, patient admin 100mg trazodone  Will CTM  Continuous patient safety checks ongoing

## 2022-12-10 NOTE — PROGRESS NOTES
Progress Note - [address] [place] 47 y o  female MRN: [id number]  Unit/Bed#: [facility] 247-01 Encounter: [id number]    Patient was seen today for continuation of care, records reviewed and patient was discussed with the morning case review team   Per staff, no significant issues or behavioral dyscontrol reported overnight  Continues to report to having a, "tortured mind" secondary to ongoing auditory hallucinations  Received various PRNs yesterday for sleep and psychosis  Corby Good was seen today for psychiatric follow-up  On assessment today, Corby Good was resting in the group room quietly, keeping to herself drinking coffee  She was agreeable to participate in interview and was generally cooperative and pleasant  She reports struggling with ongoing auditory hallucinations which have been bothersome to her  She did not go into detail about what these hallucinations were or context and remained vague  She believes that these are in fact auditory hallucinations and not her own thoughts; did not seem very internally preoccupied on exam today but is dysphoric, sad and constricted  Otherwise, we discussed her ongoing depression and anxiety and she states that she is hopeful to go to [facility] next week to start ECT  Was questioned if her Clozaril level had returned however explained that this is still pending at present  Corby Good denies acute suicidal/self-harm ideation/intent/plan upon direct inquiry at this time  Corby Good remains behaviorally appropriate, no agitation or aggression noted on exam or in report  Corby Good also denies HI/AH/VH, and does not appear overtly manic  No overt delusions or paranoia are verbalized  Impulse control is fair   Corby Good remains adherent to her current psychotropic medication regimen and denies any side effects from medications, as well as none noted on exam     PRNs: Cogentin 1 mg at 1611, Atarax 50 mg at 1811, Zyprexa 10 mg at 1548, Zyprexa 5 mg at 2004, trazodone 100 mg at 2210 for sleep, anxiety and psychosis  VS: Reviewed below    Of note -neurology consult gave diagnosis of mild neurocognitive disorder for Jo-Ann    Vitals:  Vitals:    12/10/22 0731   BP: 120/78   Pulse: 100   Resp: 18   Temp: 97 6 °F (36 4 °C)   SpO2: 94%       Laboratory Results:    I have personally reviewed all pertinent laboratory/tests results  Most Recent Labs:   Lab Results   Component Value Date    WBC 8 15 12/08/2022    RBC 4 65 12/08/2022    HGB 13 3 12/08/2022    HCT 42 0 12/08/2022     12/08/2022    RDW 14 8 12/08/2022    NEUTROABS 5 17 12/08/2022    SODIUM 138 12/02/2022    K 3 9 12/02/2022     12/02/2022    CO2 27 12/02/2022    BUN 8 12/02/2022    CREATININE 0 66 12/02/2022    GLUC 95 12/02/2022    GLUF 95 12/02/2022    CALCIUM 9 3 12/02/2022    AST 22 12/02/2022    ALT 22 12/02/2022    ALKPHOS 67 12/02/2022    TP 7 0 12/02/2022    ALB 4 2 12/02/2022    TBILI 0 23 12/02/2022    CHOLESTEROL 200 (H) 12/02/2022    HDL 51 12/02/2022    TRIG 127 12/02/2022    LDLCALC 124 (H) 12/02/2022    NONHDLC 149 12/02/2022    AMMONIA 28 10/27/2017    JGI6AZVDXRHT 2 264 12/02/2022    FREET4 0 89 03/24/2016    PREGUR Negative 11/07/2022    PREGSERUM Negative 10/21/2019    HCG <2 00 04/10/2014    RPR Non-Reactive 12/07/2022    HGBA1C 5 6 12/02/2022     12/02/2022       Psychiatric Review of Systems:  Behavior over the last 24 hours:  unchanged     Sleep: Difficulty falling asleep  Appetite: Fair  Medication side effects: Drooling  ROS: Drooling, denies shortness of breath or chest pain and all other systems are negative for acute changes    Mental Status Evaluation:  Appearance:  age appropriate, casually dressed, looks stated age   Behavior:  cooperative, calm   Speech:  scant   Mood:  depressed, anxious   Affect:  constricted, dysphoric and sad   Thought Process:  illogical, perseverative   Thought Content:  negative thoughts, ruminating thoughts, no overt paranoia noted on exam Perceptual Disturbances: auditory hallucinations   Risk Potential: Suicidal ideation - None at present  Homicidal ideation - None at present  Potential for aggression - No   Memory:  recent and remote memory grossly intact   Sensorium  person, place and time/date      Consciousness:  alert and awake   Attention: attention span and concentration are age appropriate   Insight:  limited   Judgment: limited   Gait/Station: normal gait/station   Motor Activity: no abnormal movements   Progress Toward Goals:   Della Riedel is progressing towards goals of inpatient psychiatric treatment by continued medication compliance and is attending therapeutic modalities on the milieu  However, the patient continues to require inpatient psychiatric hospitalization for continued medication management and titration to optimize symptom reduction, improve sleep hygiene, and demonstrate adequate self-care  Assessment/Plan   Principal Problem:    Schizoaffective disorder, bipolar type ([facility] [address] )      Recommended Treatment: Treatment plan and medication changes discussed and per the attending physician the plan is: 1  Continue with group therapy, milieu therapy and occupational therapy  2  Behavioral Health checks every 7 minutes  3  Continue frequent safety checks and vitals per unit protocol  4  Continue with SLIM medical management as indicated  5  Continue with current medication regimen  6  Will review labs in the a m  7 Disposition Planning: Discharge planning and efforts remain ongoing    Behavioral Health Medications: all current active meds have been reviewed    Current Facility-Administered Medications   Medication Dose Route Frequency Provider Last Rate   • acetaminophen  650 mg Oral Q6H PRN JARED Clemons     • acetaminophen  650 mg Oral Q4H PRN [facility]irsten [facility]kson [facility] JARED CASE     • acetaminophen  975 mg Oral Q6H PRN JARED Clemons     • albuterol  2 puff Inhalation Q4H PRN JARED Clemons     • aluminum-magnesium hydroxide-simethicone  30 mL Oral Q4H PRN [facility] Medei, CRNP     • haloperidol lactate  5 mg Intramuscular Q4H PRN Max 4/day Delvis Guest Medei, CRNP      And   • LORazepam  2 mg Intramuscular Q4H PRN Max 4/day Delvis Guest Medei, CRNP      And   • benztropine  1 mg Intramuscular Q4H PRN Max 4/day Delvis Guest Medei, CRNP     • benztropine  1 mg Oral Q6H PRN Delvis Guest Medei, CRNP     • bisacodyl  10 mg Rectal Daily PRN RAMIN ChandraC     • cholecalciferol  1,000 Units Oral Daily Delvis Guest Medei, CRNP     • cloZAPine  200 mg Oral HS Jordan C Holter, DO     • cloZAPine  50 mg Oral Daily Alexandrea Dawson MD     • cloZAPine  50 mg Oral Before Dinner [facility] JARED CASE     • hydrOXYzine HCL  25 mg Oral Q6H PRN Max 4/day Delvis Guest Medei, CRNP      Or   • diphenhydrAMINE  50 mg Intramuscular Q6H PRN Delvis Guest Medei, CRNP     • docusate sodium  100 mg Oral BID Payton Conrad PA-C     • fenofibrate  145 mg Oral Daily Delvis Guest Medei, CRNP     • fluticasone  1 spray Each Nare BID [facility], [facility]     • hydrocortisone   Topical 4x Daily PRN Payton Conrad PA-C     • hydrocortisone   Topical 4x Daily PRN Payton Conrad PA-C     • hydrOXYzine HCL  10 mg Oral Q6H PRN Max 4/day Delvis Guest Medei, CRNP     • hydrOXYzine HCL  50 mg Oral Q6H PRN Max 4/day Delvis Guest Medei, CRNP      Or   • LORazepam  2 mg Intramuscular Q6H PRN Delvis Guest Medei, CRNP     • lidocaine  1 patch Topical Daily Payton Conrad PA-C     • loratadine  10 mg Oral Daily [facility], CRNP     • LORazepam  0 5 mg Oral Daily Chanelle Mcdermott MD     • LORazepam  1 mg Oral HS Jordan C Holter, DO     • methocarbamol  500 mg Oral Q6H PRN Payton Conrad PA-C     • nicotine  1 patch Transdermal Daily JARED Levine     • OLANZapine  10 mg Oral Q6H PRN Max 3/day JARED Levine     • OLANZapine  5 mg Oral Q6H PRN Max 3/day JARED Levine     • pantoprazole  40 mg Oral Early Morning JARED Levine     • polyethylene glycol  17 g Oral Daily PRN Payton Conrad PA-C     • senna-docusate sodium  1 tablet Oral HS Payton Conrad PA-C     • sorbitol  30 mL Oral Q1H PRN Payton Conrad PA-C     • traZODone  100 mg Oral HS PRN JARED Vela     • traZODone  25 mg Oral Q8H PRN JARED Vela     • venlafaxine  112 5 mg Oral Daily Didi Partida MD         Risks / Benefits of Treatment:  Risks, benefits, and possible side effects of medications explained to patient and patient verbalizes understanding and agreement for treatment  Counseling / Coordination of Care:  Patient's progress reviewed with nursing staff  Medications, treatment progress and treatment plan reviewed with patient  Supportive counseling provided to the patient  Total floor/unit time spent today 25 minutes  Greater than 50% of total time was spent with the patient and / or family counseling and / or coordination of care  A description of the counseling / coordination of care: medication education, treatment plan, supportive therapy

## 2022-12-10 NOTE — NURSING NOTE
Administered atarax 50 mg po and Zyprexa 5 mg po for complaints of moderate to severe anxiety and agitation  Pt observed in her room yelling and screaming out  Pt endorsed having hallucinations "that just wont leave me alone" at time of assessment  Safety precautions maintained  Will continue to monitor and assess

## 2022-12-10 NOTE — NURSING NOTE
Patient was withdrawn to room this evening  Patient endorsed AH, stating she has "tortured mind from [facility]"  Not command in nature  Endorses anxiety  Denies all other psych symptoms  Patient requested PRN  Admin 5mg zyprexa PO  Will monitor for effectiveness  Continuous patient safety checks ongoing

## 2022-12-11 RX ADMIN — CLOZAPINE 50 MG: 25 TABLET ORAL at 16:53

## 2022-12-11 RX ADMIN — POLYETHYLENE GLYCOL 3350 17 G: 17 POWDER, FOR SOLUTION ORAL at 11:17

## 2022-12-11 RX ADMIN — LORAZEPAM 1 MG: 1 TABLET ORAL at 21:02

## 2022-12-11 RX ADMIN — OLANZAPINE 10 MG: 10 TABLET, FILM COATED ORAL at 10:51

## 2022-12-11 RX ADMIN — Medication 1000 UNITS: at 09:37

## 2022-12-11 RX ADMIN — DOCUSATE SODIUM 100 MG: 100 CAPSULE, LIQUID FILLED ORAL at 18:15

## 2022-12-11 RX ADMIN — FLUTICASONE PROPIONATE 1 SPRAY: 50 SPRAY, METERED NASAL at 09:27

## 2022-12-11 RX ADMIN — NICOTINE 1 PATCH: 21 PATCH, EXTENDED RELEASE TRANSDERMAL at 09:28

## 2022-12-11 RX ADMIN — LIDOCAINE 1 PATCH: 50 PATCH TOPICAL at 09:28

## 2022-12-11 RX ADMIN — LORATADINE 10 MG: 10 TABLET ORAL at 09:37

## 2022-12-11 RX ADMIN — CLOZAPINE 200 MG: 100 TABLET ORAL at 21:02

## 2022-12-11 RX ADMIN — CLOZAPINE 50 MG: 25 TABLET ORAL at 09:24

## 2022-12-11 RX ADMIN — FENOFIBRATE 145 MG: 145 TABLET, COATED ORAL at 09:23

## 2022-12-11 RX ADMIN — PANTOPRAZOLE SODIUM 40 MG: 40 TABLET, DELAYED RELEASE ORAL at 09:24

## 2022-12-11 RX ADMIN — ALUMINUM HYDROXIDE, MAGNESIUM HYDROXIDE, AND DIMETHICONE 30 ML: 200; 20; 200 SUSPENSION ORAL at 18:15

## 2022-12-11 RX ADMIN — METHOCARBAMOL 500 MG: 500 TABLET ORAL at 21:02

## 2022-12-11 RX ADMIN — LORAZEPAM 0.5 MG: 0.5 TABLET ORAL at 09:25

## 2022-12-11 RX ADMIN — DOCUSATE SODIUM AND SENNOSIDES 1 TABLET: 8.6; 5 TABLET ORAL at 21:02

## 2022-12-11 RX ADMIN — DOCUSATE SODIUM 100 MG: 100 CAPSULE, LIQUID FILLED ORAL at 09:24

## 2022-12-11 RX ADMIN — HYDROXYZINE HYDROCHLORIDE 25 MG: 25 TABLET, FILM COATED ORAL at 10:51

## 2022-12-11 RX ADMIN — OLANZAPINE 5 MG: 5 TABLET, FILM COATED ORAL at 19:29

## 2022-12-11 RX ADMIN — VENLAFAXINE HYDROCHLORIDE 112.5 MG: 75 CAPSULE, EXTENDED RELEASE ORAL at 09:23

## 2022-12-11 RX ADMIN — TRAZODONE HYDROCHLORIDE 100 MG: 100 TABLET ORAL at 21:02

## 2022-12-11 RX ADMIN — HYDROCORTISONE 2.5% 1 APPLICATION: 25 CREAM TOPICAL at 15:01

## 2022-12-11 RX ADMIN — FLUTICASONE PROPIONATE 1 SPRAY: 50 SPRAY, METERED NASAL at 18:15

## 2022-12-11 RX ADMIN — HYDROXYZINE HYDROCHLORIDE 50 MG: 50 TABLET, FILM COATED ORAL at 19:29

## 2022-12-11 NOTE — PROGRESS NOTES
Progress Note - Marilou Torres 47 y o  female MRN: [id number]    Unit/Bed#: [facility] 247-01 Encounter: [id number]        Subjective:   Patient seen and examined at bedside after reviewing the chart and discussing the case with the caring staff  Patient examined at bedside  Patient reports no acute symptoms  Physical Exam   Vitals: Blood pressure 117/75, pulse 96, temperature 98 3 °F (36 8 °C), temperature source Temporal, resp  rate 16, height 5' 1" (1 549 m), weight 71 2 kg (157 lb), SpO2 100 %, not currently breastfeeding  ,Body mass index is 29 66 kg/m²  Constitutional: Patient in no acute distress  HEENT: PERR, EOMI, MMM  Cardiovascular: Normal rate and regular rhythm  Pulmonary/Chest: Effort normal and breath sounds normal    Abdomen: Nondistended, +BS, soft, NT  Assessment/Plan:  Marilou Torres is a 47y o  year old female with schizoaffective disorder      1  GERD   Continue Protonix 40 mg daily, Mylanta as needed  2  Tobacco abuse   Patient is on nicotine transdermal patch 21 mg/24 hr   3  Hyperlipidemia   Continue fenofibrate 145 mg daily  4  Constipation   Start Colace 100 mg twice daily  Patient may take MiraLax as needed  5  Asthma   Patient may use albuterol inhaler as needed      6  Allergic rhinitis  Patient is on Claritin 10 mg daily and Flonase nasal spray twice daily  7  Vitamin-D deficiency  Continue vitamin D3 1000 units daily     8  Hemorrhoids   Patient may use Anusol cream as needed  9  Concern for STI  HIV and CT/GC negative, RPR nonreactive  10  Chronic low back pain  Tylenol as needed  Add lidocaine patch daily

## 2022-12-11 NOTE — PLAN OF CARE
Problem: Alteration in Thoughts and Perception  Goal: Treatment Goal: Gain control of psychotic behaviors/thinking, reduce/eliminate presenting symptoms and demonstrate improved reality functioning upon discharge  Outcome: Not Progressing  Goal: Verbalize thoughts and feelings  Description: Interventions:  - Promote a nonjudgmental and trusting relationship with the patient through active listening and therapeutic communication  - Assess patient's level of functioning, behavior and potential for risk  - Engage patient in 1 on 1 interactions  - Encourage patient to express fears, feelings, frustrations, and discuss symptoms    - [place] patient to reality, help patient recognize reality-based thinking   - Administer medications as ordered and assess for potential side effects  - Provide the patient education related to the signs and symptoms of the illness and desired effects of prescribed medications  Outcome: Not Progressing  Goal: Refrain from acting on delusional thinking/internal stimuli  Description: Interventions:  - Monitor patient closely, per order   - Utilize least restrictive measures   - Set reasonable limits, give positive feedback for acceptable   - Administer medications as ordered and monitor of potential side effects  Outcome: Not Progressing     Problem: Ineffective Coping  Goal: Cooperates with admission process  Description: Interventions:   - Complete admission process  Outcome: Progressing     Problem: Risk for Self Injury/Neglect  Goal: Treatment Goal: Remain safe during length of stay, learn and adopt new coping skills, and be free of self-injurious ideation, impulses and acts at the time of discharge  Outcome: Progressing  Goal: Refrain from harming self  Description: Interventions:  - Monitor patient closely, per order  - Develop a trusting relationship  - Supervise medication ingestion, monitor effects and side effects   Outcome: Progressing  See chart note

## 2022-12-11 NOTE — NURSING NOTE
Patient has been having a difficult evening  Frequent screaming in her room  "I hate you!"  Shut the f*ck up"  When staff tries to check to see how she's going she either insists she is fine or tells staff to go away  Requested prn trazodone and robaxin with her meds

## 2022-12-11 NOTE — PROGRESS NOTES
Progress Note - [address] Adonis 47 y o  female MRN: [id number]  Unit/Bed#: [facility] 247-01 Encounter: [id number]    Patient was seen today for continuation of care, records reviewed and patient was discussed with the morning case review team   Per staff, patient generally in control however when her auditory hallucinations are too overwhelming for her other periods to which she will scream and yell  Yesterday afternoon, did hear patient various times throughout the afternoon and early morning responding  Jnena Nguyen was seen today for psychiatric follow-up  On presentation, was pacing around community however was agreeable to participate in interview  She continues to state that she is struggling significantly with auditory hallucinations that, "just do not stop "  She is apologetic about her yelling on the unit however states that they just become so, "overwhelming "  She is hopeful to be transferred to [facility] soon to pursue ECT treatment which she reported was beneficial for her in the past   She reports only having 3-4 sessions and believes that she may require more  Otherwise, she feels that these voices have been impacting her quality of life making her feel more depressed and anxious  Explained her clozapine level is still pending at present  Jenna Nguyen denies acute suicidal/self-harm ideation/intent/plan upon direct inquiry at this time  Jenna Nguyen remains behaviorally appropriate, no agitation or aggression noted on exam or in report  Jenna Nguyen continues to endorse ongoing auditory (no visual) hallucinations which have been distressing for her  No overt delusions or paranoia are verbalized  Impulse control is limited   Jenna Nguyen remains adherent to her current psychotropic medication regimen and denies any side effects from medications, as well as none noted on exam     PRNs: Atarax 25 mg at 1516, Atarax 50 mg at 1159, Zyprexa 10 mg at 1516, Zyprexa 5 mg at 1159, trazodone 100 mg at 2054  VS: Reviewed below      Vitals:  Vitals:    12/11/22 0723   BP: 117/75   Pulse: 96   Resp: 16   Temp: 98 3 °F (36 8 °C)   SpO2: 100%       Laboratory Results:    I have personally reviewed all pertinent laboratory/tests results    Most Recent Labs:   Lab Results   Component Value Date    WBC 8 15 12/08/2022    RBC 4 65 12/08/2022    HGB 13 3 12/08/2022    HCT 42 0 12/08/2022     12/08/2022    RDW 14 8 12/08/2022    NEUTROABS 5 17 12/08/2022    SODIUM 138 12/02/2022    K 3 9 12/02/2022     12/02/2022    CO2 27 12/02/2022    BUN 8 12/02/2022    CREATININE 0 66 12/02/2022    GLUC 95 12/02/2022    GLUF 95 12/02/2022    CALCIUM 9 3 12/02/2022    AST 22 12/02/2022    ALT 22 12/02/2022    ALKPHOS 67 12/02/2022    TP 7 0 12/02/2022    ALB 4 2 12/02/2022    TBILI 0 23 12/02/2022    CHOLESTEROL 200 (H) 12/02/2022    HDL 51 12/02/2022    TRIG 127 12/02/2022    LDLCALC 124 (H) 12/02/2022    NONHDLC 149 12/02/2022    AMMONIA 28 10/27/2017    AOF9CMYBYKOC 2 264 12/02/2022    FREET4 0 89 03/24/2016    PREGUR Negative 11/07/2022    PREGSERUM Negative 10/21/2019    HCG <2 00 04/10/2014    RPR Non-Reactive 12/07/2022    HGBA1C 5 6 12/02/2022     12/02/2022       Psychiatric Review of Systems:  Behavior over the last 24 hours: No change  Sleep: Fragmented  Appetite: Fair  Medication side effects: Complaining of drooling  ROS: Drooling, denies shortness of breath or chest pain and all other systems are negative for acute changes    Mental Status Evaluation:  Appearance:   Dressed in pajamas, appears stated age, overweight, fair self-care   Behavior:   Cooperative and pleasant   Speech:   Normal rate and volume   Mood:   "Frustrated"   Affect:  constricted   Thought Process:   Perseverative   Thought Content:  negative thoughts, ruminating thoughts, no overt paranoia noted on exam   Perceptual Disturbances: auditory hallucinations   Risk Potential: Suicidal ideation - None at present  Homicidal ideation - None at present  Potential for aggression - No   Memory:  recent and remote memory grossly intact   Sensorium  person, place and time/date      Consciousness:  alert and awake   Attention: attention span and concentration are age appropriate   Insight:  limited   Judgment: limited   Gait/Station: normal gait/station   Motor Activity: no abnormal movements   Progress Toward Goals:   Terry Gordon is progressing towards goals of inpatient psychiatric treatment by continued medication compliance and is attending therapeutic modalities on the milieu  However, the patient continues to require inpatient psychiatric hospitalization for continued medication management and titration to optimize symptom reduction, improve sleep hygiene, and demonstrate adequate self-care  Assessment/Plan   Principal Problem:    Schizoaffective disorder, bipolar type (HCC)  Active Problems:    Mild neurocognitive disorder      Recommended Treatment: Treatment plan and medication changes discussed and per the attending physician the plan is: 1  Continue with group therapy, milieu therapy and occupational therapy  2  Behavioral Health checks every 7 minutes  3  Continue frequent safety checks and vitals per unit protocol  4  Continue with SLIM medical management as indicated  5  Continue with current medication regimen  6  Will review labs in the a m  7 Disposition Planning: Discharge planning and efforts remain ongoing -plan to transfer to [facility] to pursue ECT treatment    Behavioral Health Medications: all current active meds have been reviewed    Current Facility-Administered Medications   Medication Dose Route Frequency Provider Last Rate   • acetaminophen  650 mg Oral Q6H PRN Shad Avkierra Mazariegos, CRNP     • acetaminophen  650 mg Oral Q4H PRN Shad [facility]ED [facility]     • acetaminophen  975 mg Oral Q6H PRN Shad Ave Medozzy, CRNP     • albuterol  2 puff Inhalation Q4H PRN Shad Mazariegos CRNP     • aluminum-magnesium hydroxide-simethicone  30 mL Oral Q4H PRN JARED Child     • haloperidol lactate  5 mg Intramuscular Q4H PRN Max 4/day [facility]      And   • LORazepam  2 mg Intramuscular Q4H PRN Max 4/day Shad [address] Medei, CRNP      And   • benztropine  1 mg Intramuscular Q4H PRN Max 4/day Shad Ave Medei, CRNP     • benztropine  1 mg Oral Q6H PRN [address]ad [address] Medei, CRNP     • bisacodyl  10 mg Rectal Daily PRN FAISAL Chandra-C     • cholecalciferol  1,000 Units Oral Daily Sh[address] Medei, CRNP     • cloZAPine  200 mg Oral HS Jordan C Holter, DO     • cloZAPine  50 mg Oral Daily Venice Moran MD     • cloZAPine  50 mg Oral Before Dinner [facility] JARED CAES     • hydrOXYzine HCL  25 mg Oral Q6H PRN Max 4/day Shad [address] Medei, CRNP      Or   • diphenhydrAMINE  50 mg Intramuscular Q6H PRN Shad [address] Medei, CRNP     • docusate sodium  100 mg Oral BID RAMIN ChandraC     • fenofibrate  145 mg Oral Daily [address] Medei, CRNP     • fluticasone  1 spray Each Nare BID Shad [address] Medei, CRNP     • hydrocortisone   Topical 4x Daily PRN RAMIN ChandraC     • hydrocortisone   Topical 4x Daily PRN RAMIN ChandraC     • hydrOXYzine HCL  10 mg Oral Q6H PRN Max 4/day [address]ad [address] Medei, CRNP     • hydrOXYzine HCL  50 mg Oral Q6H PRN Max 4/day Shad Ave Medei, CRNP      Or   • LORazepam  2 mg Intramuscular Q6H PRN [address]ad [address] Medei, CRNP     • lidocaine  1 patch Topical Daily RAMIN ChandraC     • loratadine  10 mg Oral Daily Shad [address] Medei, CRNP     • LORazepam  0 5 mg Oral Daily Masoud Coulter MD     • LORazepam  1 mg Oral HS Jordan C Holter, DO     • methocarbamol  500 mg Oral Q6H PRN RAMIN ChandraC     • nicotine  1 patch Transdermal Daily Shad [address] Medei, CRNP     • OLANZapine  10 mg Oral Q6H PRN Max 3/day JARED Wild     • OLANZapine  5 mg Oral Q6H PRN Max 3/day JARED Wild     • pantoprazole  40 mg Oral Early Morning JARED Wild     • polyethylene glycol  17 g Oral Daily PRN Payton L Dagnall, MARTINE     • senna-docusate sodium  1 tablet Oral HS Payton Conrad PA-C     • sorbitol  30 mL Oral Q1H PRN Payton Conrad PA-C     • traZODone  100 mg Oral HS PRN JARED Pratt     • traZODone  25 mg Oral Q8H PRN JARED Pratt     • venlafaxine  112 5 mg Oral Daily Rima Hung MD         Risks / Benefits of Treatment:  Risks, benefits, and possible side effects of medications explained to patient and patient verbalizes understanding and agreement for treatment  Counseling / Coordination of Care:  Patient's progress reviewed with nursing staff  Medications, treatment progress and treatment plan reviewed with patient  Supportive counseling provided to the patient  Total floor/unit time spent today 25 minutes  Greater than 50% of total time was spent with the patient and / or family counseling and / or coordination of care  A description of the counseling / coordination of care: medication education, treatment plan, supportive therapy

## 2022-12-12 RX ORDER — MAGNESIUM HYDROXIDE/ALUMINUM HYDROXICE/SIMETHICONE 120; 1200; 1200 MG/30ML; MG/30ML; MG/30ML
30 SUSPENSION ORAL EVERY 4 HOURS PRN
Refills: 0 | Status: ON HOLD
Start: 2022-12-12

## 2022-12-12 RX ORDER — ACETAMINOPHEN 325 MG/1
650 TABLET ORAL EVERY 6 HOURS PRN
Refills: 0 | Status: ON HOLD
Start: 2022-12-12

## 2022-12-12 RX ORDER — LIDOCAINE 50 MG/G
1 PATCH TOPICAL DAILY
Refills: 0 | Status: ON HOLD
Start: 2022-12-13

## 2022-12-12 RX ORDER — SORBITOL SOLUTION 70 %
30 SOLUTION, ORAL MISCELLANEOUS
Refills: 0 | Status: ON HOLD
Start: 2022-12-12

## 2022-12-12 RX ORDER — AMOXICILLIN 250 MG
1 CAPSULE ORAL
Refills: 0 | Status: ON HOLD
Start: 2022-12-12

## 2022-12-12 RX ORDER — CLOZAPINE 25 MG/1
75 TABLET ORAL DAILY
Status: DISCONTINUED | OUTPATIENT
Start: 2022-12-13 | End: 2022-12-14 | Stop reason: HOSPADM

## 2022-12-12 RX ORDER — DOCUSATE SODIUM 100 MG/1
100 CAPSULE, LIQUID FILLED ORAL 2 TIMES DAILY
Refills: 0 | Status: ON HOLD
Start: 2022-12-12

## 2022-12-12 RX ORDER — CLOZAPINE 25 MG/1
75 TABLET ORAL
Status: DISCONTINUED | OUTPATIENT
Start: 2022-12-12 | End: 2022-12-14 | Stop reason: HOSPADM

## 2022-12-12 RX ORDER — ACETAMINOPHEN 325 MG/1
975 TABLET ORAL EVERY 6 HOURS PRN
Refills: 0 | Status: ON HOLD
Start: 2022-12-12

## 2022-12-12 RX ORDER — LORATADINE 10 MG/1
10 TABLET ORAL DAILY
Refills: 0 | Status: ON HOLD
Start: 2022-12-13

## 2022-12-12 RX ORDER — ACETAMINOPHEN 325 MG/1
650 TABLET ORAL EVERY 4 HOURS PRN
Refills: 0 | Status: ON HOLD
Start: 2022-12-12

## 2022-12-12 RX ADMIN — BENZTROPINE MESYLATE 1 MG: 1 INJECTION INTRAMUSCULAR; INTRAVENOUS at 12:36

## 2022-12-12 RX ADMIN — METHOCARBAMOL 500 MG: 500 TABLET ORAL at 20:37

## 2022-12-12 RX ADMIN — PANTOPRAZOLE SODIUM 40 MG: 40 TABLET, DELAYED RELEASE ORAL at 08:16

## 2022-12-12 RX ADMIN — LIDOCAINE 1 PATCH: 50 PATCH TOPICAL at 08:14

## 2022-12-12 RX ADMIN — NICOTINE 1 PATCH: 21 PATCH, EXTENDED RELEASE TRANSDERMAL at 08:18

## 2022-12-12 RX ADMIN — LORAZEPAM 0.5 MG: 0.5 TABLET ORAL at 08:16

## 2022-12-12 RX ADMIN — OLANZAPINE 10 MG: 10 TABLET, FILM COATED ORAL at 11:47

## 2022-12-12 RX ADMIN — Medication 1000 UNITS: at 08:16

## 2022-12-12 RX ADMIN — DOCUSATE SODIUM 100 MG: 100 CAPSULE, LIQUID FILLED ORAL at 08:16

## 2022-12-12 RX ADMIN — TRAZODONE HYDROCHLORIDE 100 MG: 100 TABLET ORAL at 20:37

## 2022-12-12 RX ADMIN — CLOZAPINE 50 MG: 25 TABLET ORAL at 08:15

## 2022-12-12 RX ADMIN — LORAZEPAM 1 MG: 1 TABLET ORAL at 20:37

## 2022-12-12 RX ADMIN — LORAZEPAM 2 MG: 2 INJECTION INTRAMUSCULAR; INTRAVENOUS at 12:36

## 2022-12-12 RX ADMIN — FLUTICASONE PROPIONATE 1 SPRAY: 50 SPRAY, METERED NASAL at 08:13

## 2022-12-12 RX ADMIN — CLOZAPINE 200 MG: 100 TABLET ORAL at 20:36

## 2022-12-12 RX ADMIN — HALOPERIDOL LACTATE 5 MG: 5 INJECTION, SOLUTION INTRAMUSCULAR at 12:36

## 2022-12-12 RX ADMIN — FLUTICASONE PROPIONATE 1 SPRAY: 50 SPRAY, METERED NASAL at 17:38

## 2022-12-12 RX ADMIN — HYDROXYZINE HYDROCHLORIDE 50 MG: 50 TABLET, FILM COATED ORAL at 11:49

## 2022-12-12 RX ADMIN — LORATADINE 10 MG: 10 TABLET ORAL at 08:16

## 2022-12-12 RX ADMIN — FENOFIBRATE 145 MG: 145 TABLET, COATED ORAL at 08:15

## 2022-12-12 RX ADMIN — VENLAFAXINE HYDROCHLORIDE 112.5 MG: 75 CAPSULE, EXTENDED RELEASE ORAL at 08:16

## 2022-12-12 RX ADMIN — CLOZAPINE 75 MG: 25 TABLET ORAL at 15:41

## 2022-12-12 RX ADMIN — DOCUSATE SODIUM AND SENNOSIDES 1 TABLET: 8.6; 5 TABLET ORAL at 20:36

## 2022-12-12 RX ADMIN — DOCUSATE SODIUM 100 MG: 100 CAPSULE, LIQUID FILLED ORAL at 17:38

## 2022-12-12 NOTE — NURSING NOTE
Patient visible on unit  Report anxiety atarax given at 1149  Zyprexa 10 mg 1149 for agitation  Moments later patient vomited undigested food  Patient  pressures and responding to  Internal stimuli  IM ativan 2 mg right deltoid, Haldol 5 mg right  deltoid  and cogentin 1mg  Left deltoid  With some effect   Continue to monitor

## 2022-12-12 NOTE — PROGRESS NOTES
12/12/22 0730   Activity/Group Checklist   Group   (Morning Community Meeting/Daily Check-In)   Attendance Attended   Attendance Duration (min) 46-60   Interactions Interacted appropriately   Affect/Mood Appropriate;Calm   Goals Achieved Identified feelings; Discussed coping strategies; Able to listen to others; Able to engage in interactions; Able to reflect/comment on own behavior

## 2022-12-12 NOTE — PROGRESS NOTES
Progress Note - Yann Castellanos 47 y o  female MRN: [id number]    Unit/Bed#: [facility] 247-01 Encounter: [id number]        Subjective:   Patient seen and examined at bedside after reviewing the chart and discussing the case with the caring staff  Patient examined at bedside  Patient reports no acute symptoms  Patient is a possible discharge tomorrow or Wednesday to [facility] for ECT  Patient will not be needing any scripts  I reviewed and reconciled patient's problem list and medications  Physical Exam   Vitals: Blood pressure 106/76, pulse 94, temperature 97 8 °F (36 6 °C), temperature source Temporal, resp  rate 18, height 5' 1" (1 549 m), weight 71 2 kg (157 lb), SpO2 91 %, not currently breastfeeding  ,Body mass index is 29 66 kg/m²  Constitutional: Patient in no acute distress  HEENT: PERR, EOMI, MMM  Cardiovascular: Normal rate and regular rhythm  Pulmonary/Chest: Effort normal and breath sounds normal    Abdomen: Nondistended, +BS, soft, NT  Assessment/Plan:  Yann Castellanos is a 47y o  year old female with schizoaffective disorder  Medical clearance  Patient is medically cleared for discharge  Patient does not need any scripts as she will be transferred to [facility] for ECT  1  GERD   Continue Protonix 40 mg daily, Mylanta as needed  2  Tobacco abuse   Patient is on nicotine transdermal patch 21 mg/24 hr   3  Hyperlipidemia   Continue fenofibrate 145 mg daily  4  Constipation   Start Colace 100 mg twice daily  Patient may take MiraLax as needed  5  Asthma   Patient may use albuterol inhaler as needed      6  Allergic rhinitis  Patient is on Claritin 10 mg daily and Flonase nasal spray twice daily  7  Vitamin-D deficiency  Continue vitamin D3 1000 units daily     8  Hemorrhoids   Patient may use Anusol cream as needed  9  Concern for STI  HIV and CT/GC negative, RPR nonreactive  10  Chronic low back pain  Tylenol as needed  Add lidocaine patch daily  Principal Discharge DX:	Fall  Assessment and plan of treatment:	symptomatic treatment and head injury instructions

## 2022-12-12 NOTE — NURSING NOTE
Patient c/o of hallucinations and voices that are trying to hypnotize her  Also c/o severe anxiety  Received prn atarax 50mg and zyprexa 5mg per rating scale   Will monitor for effectiveness

## 2022-12-12 NOTE — PLAN OF CARE
Problem: Depression  Goal: Treatment Goal: Demonstrate behavioral control of depressive symptoms, verbalize feelings of improved mood/affect, and adopt new coping skills prior to discharge  Outcome: Progressing     Problem: Anxiety  Goal: Anxiety is at manageable level  Description: Interventions:  - Assess and monitor patient's anxiety level  - Monitor for signs and symptoms (heart palpitations, chest pain, shortness of breath, headaches, nausea, feeling jumpy, restlessness, irritable, apprehensive)  - Collaborate with interdisciplinary team and initiate plan and interventions as ordered    - [place] patient to unit/surroundings  - Explain treatment plan  - Encourage participation in care  - Encourage verbalization of concerns/fears  - Identify coping mechanisms  - Assist in developing anxiety-reducing skills  - Administer/offer alternative therapies  - Limit or eliminate stimulants  Outcome: Progressing

## 2022-12-12 NOTE — NURSING NOTE
Patient experiencing increased hallucinations at the start of the shift  Felt as though someone was hypnotizing her  Asking why "they" keep trying to "mess with me"  Prn medications administered per her request   Medications effective  Patient spent some time in her room and then went to the DR for a short while  No further complaints    Requested prn robaxin and trazodone with her HS meds

## 2022-12-12 NOTE — SOCIAL WORK
Franklin spoke to Ines Linda at [facility], who stated there is not an open ECT bed today  [phone]     Franklin emailed ECT consent form, 201 to Daniel Screen <[email or web address]    Nursing, pt notified of no open bed, pt understanding and willing to wait for open bed

## 2022-12-12 NOTE — PROGRESS NOTES
12/12/22 1030   Activity/Group Checklist   Group   (Creative Expression [facility] Art Therapy)   Attendance Attended   Attendance Duration (min) Greater than 60   Interactions Interacted appropriately   Affect/Mood Appropriate   Goals Achieved Identified feelings; Discussed coping strategies; Able to listen to others; Able to engage in interactions; Able to reflect/comment on own behavior

## 2022-12-12 NOTE — PROGRESS NOTES
Progress Note - [address] Adonis 47 y o  female MRN: [id number]   Unit/Bed#: [facility] 247-01 Encounter: [id number]    Behavior over the last 24 hours: minimal improvement  Rudolph Ramon seen today, per staff report has been visible on the unit, still responding to internal stimuli in her room on the unit  Patient seen today for follow-up  Continues to have a dysphoric and blunted appearance  Continues to report having auditory hallucinations and ongoing feelings of hopelessness  She continues to report intermittent thoughts of self-harm she feels very distressed due to her symptoms  She is able to contract for safety while in the hospital   Sleep and appetite are fair, denies side effects to medications  Mental Status Evaluation:    Appearance:  casually dressed, marginal hygiene, looks stated age   Behavior:  calm, guarded   Speech:  slow, delayed, soft   Mood:  anxious   Affect:  blunted   Thought Process:  linear, concrete, poverty of thought   Associations: circumstantial associations   Thought Content:  paranoid delusions   Perceptual Disturbances: auditory hallucinations   Risk Potential: Suicidal ideation - None at present  Homicidal ideation - None at present   Sensorium:  oriented to person, place and time/date   Memory:  recent and remote memory grossly intact   Consciousness:  alert and awake   Attention: attention span and concentration are age appropriate   Insight:  limited   Judgment: limited   Gait/Station: normal gait/station   Motor Activity: no abnormal movements     Vital signs in last 24 hours:    Temp:  [97 5 °F (36 4 °C)-97 8 °F (36 6 °C)] 97 8 °F (36 6 °C)  HR:  [94-97] 94  Resp:  [16-18] 18  BP: (106-126)/(76-91) 106/76    Laboratory results: I have personally reviewed all pertinent laboratory/tests results          Assessment/Plan   Principal Problem:    Schizoaffective disorder, bipolar type (HCC)  Active Problems:    Mild neurocognitive disorder    Recommended Treatment: Planned medication and treatment changes:  Continue current medication  Patient with persistent and ongoing symptoms being considered for ECT    All current active medications have been reviewed  Encourage group therapy, milieu therapy and occupational therapy  Behavioral Health checks every 7 minutes  Current Facility-Administered Medications   Medication Dose Route Frequency Provider Last Rate   • acetaminophen  650 mg Oral Q6H PRN Carmenza Battiest Medei, CRNP     • acetaminophen  650 mg Oral Q4H PRN Carmenza Battiest Medei, CRNP     • acetaminophen  975 mg Oral Q6H PRN Carmenza Battiest Medei, CRNP     • albuterol  2 puff Inhalation Q4H PRN Carmenza Battiest Medei, CRNP     • aluminum-magnesium hydroxide-simethicone  30 mL Oral Q4H PRN Carmenza Battiest Medei, CRNP     • haloperidol lactate  5 mg Intramuscular Q4H PRN Max 4/day Carmenza Battiest Medei, CRNP      And   • LORazepam  2 mg Intramuscular Q4H PRN Max 4/day Carmenza Battiest Medei, CRNP      And   • benztropine  1 mg Intramuscular Q4H PRN Max 4/day Carmenza Battiest Medei, CRNP     • benztropine  1 mg Oral Q6H PRN Carmenza Battiest Medei, CRNP     • bisacodyl  10 mg Rectal Daily PRN Payton Conrad PA-C     • cholecalciferol  1,000 Units Oral Daily [facility], CR[facility]     • cloZAPine  200 mg Oral HS Jordan C Holter, DO     • cloZAPine  50 mg Oral Daily Rory Doan MD     • cloZAPine  50 mg Oral Before Dinner [facility] JARED CASE     • hydrOXYzine HCL  25 mg Oral Q6H PRN Max 4/day Carmenza Battiest Medei, CRNP      Or   • diphenhydrAMINE  50 mg Intramuscular Q6H PRN Carmenza Battiest Medei, CRNP     • docusate sodium  100 mg Oral BID Payton Conrad PA-C     • fenofibrate  145 mg Oral Daily Carmenza Battiest Medei, CRNP     • fluticasone  1 spray Each Nare BID Carmenza Battiest Medei, CRNP     • hydrocortisone   Topical 4x Daily PRN Payton Conrad PA-C     • hydrocortisone   Topical 4x Daily PRN Payton Conrad PA-C     • hydrOXYzine HCL  10 mg Oral Q6H PRN Max 4/day JARED Bolton     • hydrOXYzine HCL  50 mg Oral Q6H PRN Max 4/day JARED Blount      Or   • LORazepam  2 mg Intramuscular Q6H PRN [facility] Medei, CRNP     • lidocaine  1 patch Topical Daily Payton Conrad PA-C     • loratadine  10 mg Oral Daily [facility] Medei, CRNP     • LORazepam  0 5 mg Oral Daily Pamelia Dance, MD     • LORazepam  1 mg Oral HS Jordan C Holter, DO     • methocarbamol  500 mg Oral Q6H PRN Payton Conrad PA-C     • nicotine  1 patch Transdermal Daily [facility] Medei, CRNP     • OLANZapine  10 mg Oral Q6H PRN Max 3/day [facility] Medei, CRNP     • OLANZapine  5 mg Oral Q6H PRN Max 3/day [facility] Medei, CRNP     • pantoprazole  40 mg Oral Early Morning [facility] Medei, CRNP     • polyethylene glycol  17 g Oral Daily PRN Payton Conrad PA-C     • senna-docusate sodium  1 tablet Oral HS Payton Conrad PA-C     • sorbitol  30 mL Oral Q1H PRN Payton Conrad PA-C     • traZODone  100 mg Oral HS PRN [facility] Medei, CRNP     • traZODone  25 mg Oral Q8H PRN [facility] Medei, CRNP     • venlafaxine  112 5 mg Oral Daily Josh Manuel MD         Risks / Benefits of Treatment:    Risks, benefits, and possible side effects of medications explained to patient and patient verbalizes understanding and agreement for treatment  Counseling / Coordination of Care: Total floor / unit time spent today 25 minutes  Greater than 50% of total time was spent with the patient and / or family counseling and / or coordination of care  A description of counseling / coordination of care:  Patient's progress discussed with staff in treatment team meeting  Medications, treatment progress and treatment plan reviewed with patient      Josh Manuel MD 12/12/22

## 2022-12-12 NOTE — PROGRESS NOTES
12/12/22   Team Meeting   Meeting Type Daily Rounds   Team Members Present   Team Members Present Physician;;Nurse   Physician Team Member Dr Ema Yates MD; HOSP DR RADHA SILVA, [address] [place] [address]   Nursing Team Member Hannah Banegas RN; Missy Dalal, YUDY   Social Work Team Member Dana Rodriguez   Patient/Family Present   Patient Present No   Patient's Family Present No     Yelling, AH/VH, PRN somewhat effective, talks often about being hypnotized, ECT transfer once bed is open

## 2022-12-12 NOTE — SOCIAL WORK
Franklin met with pt in pts room for check in  Pt endorses  of voices telling her "I will isaias you", anx, dep regarding   Pt appears distressed, presents cooperative  Pt reprots feeling scared, unsettled by   Pt states she had a stalker years ago but police did not believe her due to her mental health  Pt is hopeful ECT will work, sw explained insurance is being verified

## 2022-12-12 NOTE — PLAN OF CARE
Problem: Alteration in Thoughts and Perception  Goal: Verbalize thoughts and feelings  Description: Interventions:  - Promote a nonjudgmental and trusting relationship with the patient through active listening and therapeutic communication  - Assess patient's level of functioning, behavior and potential for risk  - Engage patient in 1 on 1 interactions  - Encourage patient to express fears, feelings, frustrations, and discuss symptoms    - [place] patient to reality, help patient recognize reality-based thinking   - Administer medications as ordered and assess for potential side effects  - Provide the patient education related to the signs and symptoms of the illness and desired effects of prescribed medications  Outcome: Progressing     Problem: Risk for Self Injury/Neglect  Goal: Treatment Goal: Remain safe during length of stay, learn and adopt new coping skills, and be free of self-injurious ideation, impulses and acts at the time of discharge  Outcome: Progressing  Goal: Refrain from harming self  Description: Interventions:  - Monitor patient closely, per order  - Develop a trusting relationship  - Supervise medication ingestion, monitor effects and side effects   Outcome: Progressing   See chart note

## 2022-12-13 LAB
CLOZAPINE SERPL-MCNC: 549 NG/ML (ref 350–650)
CLOZAPINE+NOR SERPL-MCNC: 928 NG/ML
NORCLOZAPINE SERPL-MCNC: 379 NG/ML
SARS-COV-2 RNA RESP QL NAA+PROBE: NEGATIVE

## 2022-12-13 RX ORDER — FLUTICASONE PROPIONATE 50 MCG
1 SPRAY, SUSPENSION (ML) NASAL 2 TIMES DAILY
Status: CANCELLED | OUTPATIENT
Start: 2022-12-13

## 2022-12-13 RX ORDER — VENLAFAXINE HYDROCHLORIDE 75 MG/1
150 CAPSULE, EXTENDED RELEASE ORAL DAILY
Status: DISCONTINUED | OUTPATIENT
Start: 2022-12-14 | End: 2022-12-14 | Stop reason: HOSPADM

## 2022-12-13 RX ORDER — DOCUSATE SODIUM 100 MG/1
100 CAPSULE, LIQUID FILLED ORAL 2 TIMES DAILY
Status: CANCELLED | OUTPATIENT
Start: 2022-12-13

## 2022-12-13 RX ORDER — CLOZAPINE 100 MG/1
200 TABLET ORAL
Status: CANCELLED | OUTPATIENT
Start: 2022-12-13

## 2022-12-13 RX ORDER — OLANZAPINE 10 MG/1
10 TABLET ORAL
Status: CANCELLED | OUTPATIENT
Start: 2022-12-13

## 2022-12-13 RX ORDER — OLANZAPINE 10 MG/1
10 INJECTION, POWDER, LYOPHILIZED, FOR SOLUTION INTRAMUSCULAR
Status: CANCELLED | OUTPATIENT
Start: 2022-12-13

## 2022-12-13 RX ORDER — LIDOCAINE 50 MG/G
1 PATCH TOPICAL DAILY
Status: CANCELLED | OUTPATIENT
Start: 2022-12-14

## 2022-12-13 RX ORDER — ACETAMINOPHEN 325 MG/1
975 TABLET ORAL EVERY 6 HOURS PRN
Status: CANCELLED | OUTPATIENT
Start: 2022-12-13

## 2022-12-13 RX ORDER — ACETAMINOPHEN 325 MG/1
650 TABLET ORAL EVERY 4 HOURS PRN
Status: CANCELLED | OUTPATIENT
Start: 2022-12-13

## 2022-12-13 RX ORDER — MAGNESIUM HYDROXIDE/ALUMINUM HYDROXICE/SIMETHICONE 120; 1200; 1200 MG/30ML; MG/30ML; MG/30ML
30 SUSPENSION ORAL EVERY 4 HOURS PRN
Status: CANCELLED | OUTPATIENT
Start: 2022-12-13

## 2022-12-13 RX ORDER — AMOXICILLIN 250 MG
1 CAPSULE ORAL DAILY PRN
Status: CANCELLED | OUTPATIENT
Start: 2022-12-13

## 2022-12-13 RX ORDER — DIPHENHYDRAMINE HYDROCHLORIDE 50 MG/ML
50 INJECTION INTRAMUSCULAR; INTRAVENOUS EVERY 6 HOURS PRN
Status: CANCELLED | OUTPATIENT
Start: 2022-12-13

## 2022-12-13 RX ORDER — VENLAFAXINE HYDROCHLORIDE 75 MG/1
150 CAPSULE, EXTENDED RELEASE ORAL DAILY
Status: CANCELLED | OUTPATIENT
Start: 2022-12-14

## 2022-12-13 RX ORDER — PROPRANOLOL HYDROCHLORIDE 10 MG/1
10 TABLET ORAL EVERY 8 HOURS PRN
Status: CANCELLED | OUTPATIENT
Start: 2022-12-13

## 2022-12-13 RX ORDER — CLOZAPINE 25 MG/1
75 TABLET ORAL
Status: CANCELLED | OUTPATIENT
Start: 2022-12-13

## 2022-12-13 RX ORDER — LORAZEPAM 0.5 MG/1
0.5 TABLET ORAL DAILY
Status: CANCELLED | OUTPATIENT
Start: 2022-12-14

## 2022-12-13 RX ORDER — PANTOPRAZOLE SODIUM 40 MG/1
40 TABLET, DELAYED RELEASE ORAL
Status: CANCELLED | OUTPATIENT
Start: 2022-12-14

## 2022-12-13 RX ORDER — POLYETHYLENE GLYCOL 3350 17 G/17G
17 POWDER, FOR SOLUTION ORAL DAILY PRN
Status: CANCELLED | OUTPATIENT
Start: 2022-12-13

## 2022-12-13 RX ORDER — MELATONIN
1000 DAILY
Status: CANCELLED | OUTPATIENT
Start: 2022-12-14

## 2022-12-13 RX ORDER — OLANZAPINE 5 MG/1
5 TABLET ORAL
Status: CANCELLED | OUTPATIENT
Start: 2022-12-13

## 2022-12-13 RX ORDER — HYDROXYZINE 50 MG/1
50 TABLET, FILM COATED ORAL
Status: CANCELLED | OUTPATIENT
Start: 2022-12-13

## 2022-12-13 RX ORDER — LORATADINE 10 MG/1
10 TABLET ORAL DAILY
Status: CANCELLED | OUTPATIENT
Start: 2022-12-14

## 2022-12-13 RX ORDER — AMOXICILLIN 250 MG
1 CAPSULE ORAL
Status: CANCELLED | OUTPATIENT
Start: 2022-12-13

## 2022-12-13 RX ORDER — METHOCARBAMOL 500 MG/1
500 TABLET, FILM COATED ORAL EVERY 6 HOURS PRN
Status: CANCELLED | OUTPATIENT
Start: 2022-12-13

## 2022-12-13 RX ORDER — HYDROXYZINE HYDROCHLORIDE 25 MG/1
25 TABLET, FILM COATED ORAL
Status: CANCELLED | OUTPATIENT
Start: 2022-12-13

## 2022-12-13 RX ORDER — NICOTINE 21 MG/24HR
1 PATCH, TRANSDERMAL 24 HOURS TRANSDERMAL DAILY
Status: CANCELLED | OUTPATIENT
Start: 2022-12-14

## 2022-12-13 RX ORDER — BENZTROPINE MESYLATE 1 MG/1
1 TABLET ORAL
Status: CANCELLED | OUTPATIENT
Start: 2022-12-13

## 2022-12-13 RX ORDER — ACETAMINOPHEN 325 MG/1
650 TABLET ORAL EVERY 6 HOURS PRN
Status: CANCELLED | OUTPATIENT
Start: 2022-12-13

## 2022-12-13 RX ORDER — SORBITOL SOLUTION 70 %
30 SOLUTION, ORAL MISCELLANEOUS
Status: CANCELLED | OUTPATIENT
Start: 2022-12-13

## 2022-12-13 RX ORDER — CLOZAPINE 25 MG/1
75 TABLET ORAL DAILY
Status: CANCELLED | OUTPATIENT
Start: 2022-12-14

## 2022-12-13 RX ORDER — FENOFIBRATE 145 MG/1
145 TABLET, COATED ORAL DAILY
Status: CANCELLED | OUTPATIENT
Start: 2022-12-14

## 2022-12-13 RX ORDER — LORAZEPAM 1 MG/1
1 TABLET ORAL
Status: CANCELLED | OUTPATIENT
Start: 2022-12-13

## 2022-12-13 RX ORDER — TRAZODONE HYDROCHLORIDE 50 MG/1
50 TABLET ORAL
Status: CANCELLED | OUTPATIENT
Start: 2022-12-13

## 2022-12-13 RX ORDER — OLANZAPINE 10 MG/1
5 INJECTION, POWDER, LYOPHILIZED, FOR SOLUTION INTRAMUSCULAR
Status: CANCELLED | OUTPATIENT
Start: 2022-12-13

## 2022-12-13 RX ORDER — LORAZEPAM 2 MG/ML
2 INJECTION INTRAMUSCULAR EVERY 6 HOURS PRN
Status: CANCELLED | OUTPATIENT
Start: 2022-12-13

## 2022-12-13 RX ORDER — HYDROXYZINE 50 MG/1
100 TABLET, FILM COATED ORAL
Status: CANCELLED | OUTPATIENT
Start: 2022-12-13

## 2022-12-13 RX ORDER — BENZTROPINE MESYLATE 1 MG/ML
1 INJECTION INTRAMUSCULAR; INTRAVENOUS
Status: CANCELLED | OUTPATIENT
Start: 2022-12-13

## 2022-12-13 RX ORDER — OLANZAPINE 2.5 MG/1
2.5 TABLET ORAL
Status: CANCELLED | OUTPATIENT
Start: 2022-12-13

## 2022-12-13 RX ADMIN — LORAZEPAM 1 MG: 1 TABLET ORAL at 20:33

## 2022-12-13 RX ADMIN — LORAZEPAM 0.5 MG: 0.5 TABLET ORAL at 08:18

## 2022-12-13 RX ADMIN — VENLAFAXINE HYDROCHLORIDE 112.5 MG: 75 CAPSULE, EXTENDED RELEASE ORAL at 08:18

## 2022-12-13 RX ADMIN — CLOZAPINE 200 MG: 100 TABLET ORAL at 20:32

## 2022-12-13 RX ADMIN — TRAZODONE HYDROCHLORIDE 100 MG: 100 TABLET ORAL at 20:32

## 2022-12-13 RX ADMIN — CLOZAPINE 75 MG: 25 TABLET ORAL at 16:23

## 2022-12-13 RX ADMIN — DOCUSATE SODIUM AND SENNOSIDES 1 TABLET: 8.6; 5 TABLET ORAL at 20:31

## 2022-12-13 RX ADMIN — FENOFIBRATE 145 MG: 145 TABLET, COATED ORAL at 08:18

## 2022-12-13 RX ADMIN — PANTOPRAZOLE SODIUM 40 MG: 40 TABLET, DELAYED RELEASE ORAL at 08:18

## 2022-12-13 RX ADMIN — LORATADINE 10 MG: 10 TABLET ORAL at 08:18

## 2022-12-13 RX ADMIN — FLUTICASONE PROPIONATE 1 SPRAY: 50 SPRAY, METERED NASAL at 09:06

## 2022-12-13 RX ADMIN — NICOTINE 1 PATCH: 21 PATCH, EXTENDED RELEASE TRANSDERMAL at 08:20

## 2022-12-13 RX ADMIN — ALBUTEROL SULFATE 2 PUFF: 90 AEROSOL, METERED RESPIRATORY (INHALATION) at 17:21

## 2022-12-13 RX ADMIN — METHOCARBAMOL 500 MG: 500 TABLET ORAL at 20:32

## 2022-12-13 RX ADMIN — ALUMINUM HYDROXIDE, MAGNESIUM HYDROXIDE, AND DIMETHICONE 30 ML: 200; 20; 200 SUSPENSION ORAL at 11:43

## 2022-12-13 RX ADMIN — CLOZAPINE 75 MG: 25 TABLET ORAL at 08:19

## 2022-12-13 RX ADMIN — LIDOCAINE 1 PATCH: 50 PATCH TOPICAL at 08:20

## 2022-12-13 RX ADMIN — Medication 1000 UNITS: at 08:18

## 2022-12-13 RX ADMIN — OLANZAPINE 10 MG: 10 TABLET, FILM COATED ORAL at 12:17

## 2022-12-13 RX ADMIN — DOCUSATE SODIUM 100 MG: 100 CAPSULE, LIQUID FILLED ORAL at 08:18

## 2022-12-13 NOTE — PROGRESS NOTES
12/13/22    Team Meeting   Meeting Type Daily Rounds   Team Members Present   Team Members Present Physician;;Nurse   Physician Team Member Dr Trever Vuong MD; HOSP DR RADHA SILVA, [address] [place] [address]   Nursing Team Member Jonny Leal RN; Srinivas Bee, YUDY   Social Work Team Member Tabatha Ferrari, [place]   Patient/Family Present   Patient Present No   Patient's Family Present No     Tortured, yelling, AH/VH endorsed, PRN IM effective over PO PRN, cooperative, less yelling last night, increased Clozaril, ECT pending per insurance pre cert

## 2022-12-13 NOTE — PROGRESS NOTES
Additional belongings brought in for PT by friend/family  Items gone over by Yoselin Thakkar      Belongings at bedside:  Joselin Harry

## 2022-12-13 NOTE — PROGRESS NOTES
12/13/22 0650   Activity/Group Checklist   Group   (Morning Community Meeting/Daily Check-In)   Attendance Attended   Attendance Duration (min) 46-60   Interactions Interacted appropriately   Affect/Mood Appropriate;Bright   Goals Achieved Identified feelings; Discussed coping strategies; Able to listen to others; Able to engage in interactions; Able to reflect/comment on own behavior;Able to manage/cope with feelings

## 2022-12-13 NOTE — PLAN OF CARE
Problem: Alteration in Thoughts and Perception  Goal: Verbalize thoughts and feelings  Description: Interventions:  - Promote a nonjudgmental and trusting relationship with the patient through active listening and therapeutic communication  - Assess patient's level of functioning, behavior and potential for risk  - Engage patient in 1 on 1 interactions  - Encourage patient to express fears, feelings, frustrations, and discuss symptoms    - [place] patient to reality, help patient recognize reality-based thinking   - Administer medications as ordered and assess for potential side effects  - Provide the patient education related to the signs and symptoms of the illness and desired effects of prescribed medications  Outcome: Progressing     Problem: Ineffective Coping  Goal: Cooperates with admission process  Description: Interventions:   - Complete admission process  Outcome: Progressing  Goal: Identifies healthy coping skills  Outcome: Progressing  Goal: Demonstrates healthy coping skills  Outcome: Progressing  Goal: Participates in unit activities  Description: Interventions:  - Provide therapeutic environment   - Provide required programming   - Redirect inappropriate behaviors   Outcome: Progressing     Problem: Risk for Self Injury/Neglect  Goal: Treatment Goal: Remain safe during length of stay, learn and adopt new coping skills, and be free of self-injurious ideation, impulses and acts at the time of discharge  Outcome: Progressing  Goal: Refrain from harming self  Description: Interventions:  - Monitor patient closely, per order  - Develop a trusting relationship  - Supervise medication ingestion, monitor effects and side effects   Outcome: Progressing     Problem: Depression  Goal: Treatment Goal: Demonstrate behavioral control of depressive symptoms, verbalize feelings of improved mood/affect, and adopt new coping skills prior to discharge  Outcome: Progressing  Goal: Refrain from isolation  Description: Interventions:  - Develop a trusting relationship   - Encourage socialization   Outcome: Progressing  Goal: Refrain from self-neglect  Outcome: Progressing  Goal: Complete daily ADLs, including personal hygiene independently, as able  Description: Interventions:  - Observe, teach, and assist patient with ADLS  -  Monitor and promote a balance of rest/activity, with adequate nutrition and elimination   Outcome: Progressing     Problem: Anxiety  Goal: Anxiety is at manageable level  Description: Interventions:  - Assess and monitor patient's anxiety level  - Monitor for signs and symptoms (heart palpitations, chest pain, shortness of breath, headaches, nausea, feeling jumpy, restlessness, irritable, apprehensive)  - Collaborate with interdisciplinary team and initiate plan and interventions as ordered    - [place] patient to unit/surroundings  - Explain treatment plan  - Encourage participation in care  - Encourage verbalization of concerns/fears  - Identify coping mechanisms  - Assist in developing anxiety-reducing skills  - Administer/offer alternative therapies  - Limit or eliminate stimulants  Outcome: Progressing  See chart note

## 2022-12-13 NOTE — PROGRESS NOTES
Progress Note - Magui Hughes 47 y o  female MRN: [id number]    Unit/Bed#: [facility] 247-01 Encounter: [id number]        Subjective:   Patient seen and examined at bedside after reviewing the chart and discussing the case with the caring staff  Patient examined at bedside  Patient reports no acute symptoms  Patient is a possible discharge on Wednesday to [facility] for ECT  Patient will not be needing any scripts  I reviewed and reconciled patient's problem list and medications  Physical Exam   Vitals: Blood pressure 106/81, pulse 100, temperature 97 8 °F (36 6 °C), temperature source Temporal, resp  rate 18, height 5' 1" (1 549 m), weight 71 2 kg (157 lb), SpO2 94 %, not currently breastfeeding  ,Body mass index is 29 66 kg/m²  Constitutional: Patient in no acute distress  HEENT: PERR, EOMI, MMM  Cardiovascular: Normal rate and regular rhythm  Pulmonary/Chest: Effort normal and breath sounds normal    Abdomen: Nondistended, +BS, soft, NT  Assessment/Plan:  Magui Hughes is a 47y o  year old female with schizoaffective disorder  Medical clearance  Patient is medically cleared for discharge  Patient does not need any scripts as she will be transferred to [facility] for ECT  1  GERD   Continue Protonix 40 mg daily, Mylanta as needed  2  Tobacco abuse   Patient is on nicotine transdermal patch 21 mg/24 hr   3  Hyperlipidemia   Continue fenofibrate 145 mg daily  4  Constipation   Start Colace 100 mg twice daily  Patient may take MiraLax as needed  5  Asthma   Patient may use albuterol inhaler as needed      6  Allergic rhinitis  Patient is on Claritin 10 mg daily and Flonase nasal spray twice daily  7  Vitamin-D deficiency  Continue vitamin D3 1000 units daily     8  Hemorrhoids   Patient may use Anusol cream as needed  9  Concern for STI  HIV and CT/GC negative, RPR nonreactive  10  Chronic low back pain  Tylenol as needed  Add lidocaine patch daily

## 2022-12-13 NOTE — SOCIAL WORK
[place] called Vasyl Alberto [phone] to notify of ECT transfer tomorrow  Ynichole Forward will notify ACT team     [place] called [facility] ([facility]) [phone], Zhou Clark (House Supervisor);  left  [place] called agency Nurse Bud Trujillo [phone] to inform of ECT transfer, call ended mutually

## 2022-12-13 NOTE — SOCIAL WORK
Pt notified of open bed for ECT tomorrow pending covid results, pt agreeable and looks forward to transfer

## 2022-12-13 NOTE — SOCIAL WORK
Jacob [phone] and left catina vm regarding ECT  Sw called Nicolette Munguia back regarding insurance ECT precert  Call ended mutually

## 2022-12-13 NOTE — NURSING NOTE
Patient came to this nurse stating that she has some indigestion  Given 30 ml of Mylanta per PRN order

## 2022-12-13 NOTE — PROGRESS NOTES
12/13/22 1000   Activity/Group Checklist   Group   ([facility] [employer])   Attendance Attended   Attendance Duration (min) 46-60   Interactions Interacted appropriately   Affect/Mood Appropriate;Calm   Goals Achieved Identified feelings; Discussed coping strategies; Able to listen to others

## 2022-12-13 NOTE — NURSING NOTE
Patient was medication compliant this shift  Patient was visible on unit  Patient was cooperative and pleasant

## 2022-12-13 NOTE — SOCIAL WORK
Sw communicating with Negrito Silverio from [facility] regarding ECT bed for tomorrow  Provider, nursing notified, stat covid test will have results tomorrow

## 2022-12-13 NOTE — PROGRESS NOTES
Progress Note - [address] [place] 47 y o  female MRN: @MRN   Unit/Bed#: [facility] 285-03 Encounter: [id number]      Report from staff regarding this patient received and discussed, and records reviewed prior to seeing this patient  Behavior over the last 24 hours: unchanged  Hallucinations of being touched when alone make the pt scream  Voices telling the patient to eat more than she wants and unsual taste also lead to verbal agitation  The patient had some suicidal thoughts but did not want to carry them on, contracted for safety in the unit feels safe here but felt like her life no longer worth living  Patient remains anxious, calm, cooperative with session, cooperative with staff, delusional and depressed today  Sleep: decreased  Appetite: fair  Medication side effects: Yes - nocturnal hypersalivation, occasional     Mental Status Evaluation:    Appearance:  dressed appropriately, casually dressed   Mood:  depressed, anxious   Affect: constricted    Speech:  fluent   Thought Content:  paranoid and somatic delusions   Perceptual Disturbances: auditory hallucinations   Risk Potential: Suicidal ideation - Yes, without plan, contracts for safety on the unit  Homicidal ideation - None  Potential for aggression - No   Insight:  impaired due to psychosis   Judgment: poor   Motor Activity: no abnormal movements         Laboratory results:  I have personally reviewed all pertinent laboratory results  Progress Toward Goals: no significant progress today    Assessment/Plan   Principal Problem:    Schizoaffective disorder, bipolar type (HCC)  Active Problems:    Mild neurocognitive disorder    Recommended Treatment: We will continue to pursue ECT treatment  We will also increase patient's Effexor to address her depression  We will continue with Clozaril treatment of chronic treatment resistant psychotic mood disorder  Martines depression score was 25    The patient has both somatic and psychological symptoms of severe depression  Lorinda Apgar also responded to supportive therapy that was provided to help with her hope for successful medical treatment of her disorder  Planned medication and treatment changes: All current active medications have been reviewed  Continue treatment with group therapy, milieu therapy, occupational therapy and medication management  ** Please Note: This note has been constructed using a voice recognition system  **      BMP: No results for input(s): NA, K, CL, CO2, BUN in the last 72 hours      Invalid input(s): CREA, GLU  Vitals:    22 0727   BP: 106/81   Pulse: 100   Resp: 18   Temp: 97 8 °F (36 6 °C)   SpO2: 94%        Medication Administration - last 24 hours from 2022 1426 to 2022 1426       Date/Time Order Dose Route Action Action by     2022 0818 EST cholecalciferol (VITAMIN D3) tablet 1,000 Units 1,000 Units Oral Given Tacho Dawson, LPN      3275 EST fenofibrate (TRICOR) tablet 145 mg 145 mg Oral Given Tacho Dawson, LPN      2571 EST fluticasone (FLONASE) 50 mcg/act nasal spray 1 spray 1 spray Each Nare Given Tacho Dawson, N      5759 EST fluticasone (FLONASE) 50 mcg/act nasal spray 1 spray 1 spray Each Nare Given Doyna Miller, LPN      9428 EST loratadine (CLARITIN) tablet 10 mg 10 mg Oral Given Tacho Harveye, LPN      3467 EST pantoprazole (PROTONIX) EC tablet 40 mg 40 mg Oral Given Tacho Harveye, LPN      5514 EST nicotine (NICODERM CQ) 21 mg/24 hr TD 24 hr patch 1 patch 1 patch Transdermal Patch Removed Tacho Harveye, LPN      9941 EST nicotine (NICODERM CQ) 21 mg/24 hr TD 24 hr patch 1 patch 1 patch Transdermal Medication Applied Tacho Dawson, LPN      7810 EST traZODone (DESYREL) tablet 100 mg 100 mg Oral Given Azalea Penn RN     2022 1143 EST aluminum-magnesium hydroxide-simethicone (MYLANTA) oral suspension 30 mL 30 mL Oral Given Sharda Rideau, LPN     05/40/0180 0518 EST LORazepam (ATIVAN) tablet 0 5 mg 0 5 mg Oral Given Sharda Rideau, LPN     40/55/7270 6148 EST methocarbamol (ROBAXIN) tablet 500 mg 500 mg Oral Given Estel Senate, RN     12/12/2022 2120 EST cloZAPine (CLOZARIL) tablet 200 mg -- Oral Canceled Entry Estel Senate, RN     12/12/2022 2036 EST cloZAPine (CLOZARIL) tablet 200 mg 200 mg Oral Given Estel Senate, RN     12/12/2022 2120 EST LORazepam (ATIVAN) tablet 1 mg -- Oral Canceled Entry Estel Senate, RN     12/12/2022 2037 EST LORazepam (ATIVAN) tablet 1 mg 1 mg Oral Given Estel Senate, RN     12/13/2022 0818 EST docusate sodium (COLACE) capsule 100 mg 100 mg Oral Given Sharda Nancyeau, LPN     17/71/6705 3346 EST docusate sodium (COLACE) capsule 100 mg 100 mg Oral Given jerome Miller, LPN     44/97/8461 6092 EST venlafaxine (EFFEXOR-XR) 24 hr capsule 112 5 mg 112 5 mg Oral Given Sharda Rideau, LPN     59/42/2123 4893 EST OLANZapine (ZyPREXA) tablet 10 mg 10 mg Oral Given Regla Liu, RN     12/12/2022 2120 EST senna-docusate sodium (SENOKOT S) 8 6-50 mg per tablet 1 tablet -- Oral Canceled Entry Estel Senate, RN     12/12/2022 2036 EST senna-docusate sodium (SENOKOT S) 8 6-50 mg per tablet 1 tablet 1 tablet Oral Given Estel Senate, RN     12/13/2022 0820 EST lidocaine (LIDODERM) 5 % patch 1 patch 1 patch Topical Medication Applied Sharda Rideau, LPN     99/72/8453 6421 EST lidocaine (LIDODERM) 5 % patch 1 patch 1 patch Topical Patch Removed Estel Senate, RN     12/13/2022 6204 EST cloZAPine (CLOZARIL) tablet 75 mg 75 mg Oral Given Sharda Rideau, N     62/47/8239 3079 EST cloZAPine (CLOZARIL) tablet 75 mg 75 mg Oral Given Micki Polk LPN

## 2022-12-13 NOTE — NURSING NOTE
Patient more controlled this evening  No screaming out from her room  Patient did ask for her medications early but did not require any prn medications other than robaxin and trazodone  Patient is still reporting severe hallucinations  She says the [facility] are worse when she is in her room due to the noises from the air vents  VH are always present  She reports seeing ghosts, animals, and scary people with hairy faces  Patient reports she did have another BM today  Reminded patient of the importance of letting nursing know if she is experiencing any constipation due to being on clozaril    Patient acknowledged understanding

## 2022-12-14 ENCOUNTER — HOSPITAL ENCOUNTER (INPATIENT)
Facility: HOSPITAL | Age: 52
LOS: 28 days | End: 2023-01-11
Attending: PSYCHIATRY & NEUROLOGY | Admitting: PSYCHIATRY & NEUROLOGY

## 2022-12-14 VITALS
DIASTOLIC BLOOD PRESSURE: 83 MMHG | SYSTOLIC BLOOD PRESSURE: 120 MMHG | RESPIRATION RATE: 18 BRPM | HEART RATE: 104 BPM | BODY MASS INDEX: 29.64 KG/M2 | TEMPERATURE: 98.1 F | OXYGEN SATURATION: 94 % | HEIGHT: 61 IN | WEIGHT: 157 LBS

## 2022-12-14 DIAGNOSIS — F25.0 SCHIZOAFFECTIVE DISORDER, BIPOLAR TYPE (HCC): Primary | Chronic | ICD-10-CM

## 2022-12-14 DIAGNOSIS — Z00.8 MEDICAL CLEARANCE FOR PSYCHIATRIC ADMISSION: ICD-10-CM

## 2022-12-14 PROBLEM — J30.89 NON-SEASONAL ALLERGIC RHINITIS: Status: ACTIVE | Noted: 2022-12-14

## 2022-12-14 PROBLEM — M54.50 CHRONIC MIDLINE LOW BACK PAIN WITHOUT SCIATICA: Status: ACTIVE | Noted: 2022-12-14

## 2022-12-14 PROBLEM — G89.29 CHRONIC MIDLINE LOW BACK PAIN WITHOUT SCIATICA: Status: ACTIVE | Noted: 2022-12-14

## 2022-12-14 RX ORDER — ACETAMINOPHEN 325 MG/1
975 TABLET ORAL EVERY 6 HOURS PRN
Status: DISCONTINUED | OUTPATIENT
Start: 2022-12-14 | End: 2023-01-11 | Stop reason: HOSPADM

## 2022-12-14 RX ORDER — LORAZEPAM 1 MG/1
1 TABLET ORAL
Status: DISCONTINUED | OUTPATIENT
Start: 2022-12-14 | End: 2022-12-15

## 2022-12-14 RX ORDER — ACETAMINOPHEN 325 MG/1
650 TABLET ORAL EVERY 6 HOURS PRN
Status: DISCONTINUED | OUTPATIENT
Start: 2022-12-14 | End: 2023-01-11 | Stop reason: HOSPADM

## 2022-12-14 RX ORDER — METHOCARBAMOL 500 MG/1
500 TABLET, FILM COATED ORAL EVERY 6 HOURS PRN
Status: DISCONTINUED | OUTPATIENT
Start: 2022-12-14 | End: 2023-01-11 | Stop reason: HOSPADM

## 2022-12-14 RX ORDER — OLANZAPINE 10 MG/1
10 INJECTION, POWDER, LYOPHILIZED, FOR SOLUTION INTRAMUSCULAR
Status: DISCONTINUED | OUTPATIENT
Start: 2022-12-14 | End: 2022-12-27

## 2022-12-14 RX ORDER — DIPHENHYDRAMINE HYDROCHLORIDE 50 MG/ML
50 INJECTION INTRAMUSCULAR; INTRAVENOUS EVERY 6 HOURS PRN
Status: DISCONTINUED | OUTPATIENT
Start: 2022-12-14 | End: 2023-01-11 | Stop reason: HOSPADM

## 2022-12-14 RX ORDER — MELATONIN
1000 DAILY
Status: DISCONTINUED | OUTPATIENT
Start: 2022-12-15 | End: 2023-01-11 | Stop reason: HOSPADM

## 2022-12-14 RX ORDER — BENZTROPINE MESYLATE 1 MG/ML
1 INJECTION INTRAMUSCULAR; INTRAVENOUS
Status: DISCONTINUED | OUTPATIENT
Start: 2022-12-14 | End: 2023-01-11 | Stop reason: HOSPADM

## 2022-12-14 RX ORDER — CLOZAPINE 25 MG/1
75 TABLET ORAL
Status: DISCONTINUED | OUTPATIENT
Start: 2022-12-15 | End: 2022-12-15

## 2022-12-14 RX ORDER — CLOZAPINE 25 MG/1
75 TABLET ORAL DAILY
Status: DISCONTINUED | OUTPATIENT
Start: 2022-12-15 | End: 2022-12-16

## 2022-12-14 RX ORDER — POLYETHYLENE GLYCOL 3350 17 G/17G
17 POWDER, FOR SOLUTION ORAL DAILY PRN
Status: DISCONTINUED | OUTPATIENT
Start: 2022-12-14 | End: 2022-12-15

## 2022-12-14 RX ORDER — SORBITOL SOLUTION 70 %
30 SOLUTION, ORAL MISCELLANEOUS
Status: DISCONTINUED | OUTPATIENT
Start: 2022-12-14 | End: 2023-01-11 | Stop reason: HOSPADM

## 2022-12-14 RX ORDER — OLANZAPINE 10 MG/1
5 INJECTION, POWDER, LYOPHILIZED, FOR SOLUTION INTRAMUSCULAR
Status: DISCONTINUED | OUTPATIENT
Start: 2022-12-14 | End: 2022-12-27

## 2022-12-14 RX ORDER — OLANZAPINE 10 MG/1
10 TABLET ORAL
Status: DISCONTINUED | OUTPATIENT
Start: 2022-12-14 | End: 2022-12-27

## 2022-12-14 RX ORDER — LORAZEPAM 0.5 MG/1
0.5 TABLET ORAL DAILY
Status: DISCONTINUED | OUTPATIENT
Start: 2022-12-15 | End: 2022-12-16

## 2022-12-14 RX ORDER — FLUTICASONE PROPIONATE 50 MCG
1 SPRAY, SUSPENSION (ML) NASAL 2 TIMES DAILY
Status: DISCONTINUED | OUTPATIENT
Start: 2022-12-14 | End: 2023-01-11 | Stop reason: HOSPADM

## 2022-12-14 RX ORDER — ACETAMINOPHEN 325 MG/1
650 TABLET ORAL EVERY 4 HOURS PRN
Status: DISCONTINUED | OUTPATIENT
Start: 2022-12-14 | End: 2023-01-11 | Stop reason: HOSPADM

## 2022-12-14 RX ORDER — ALBUTEROL SULFATE 90 UG/1
2 AEROSOL, METERED RESPIRATORY (INHALATION) EVERY 6 HOURS PRN
Status: DISCONTINUED | OUTPATIENT
Start: 2022-12-14 | End: 2023-01-11 | Stop reason: HOSPADM

## 2022-12-14 RX ORDER — HYDROXYZINE 50 MG/1
100 TABLET, FILM COATED ORAL
Status: DISCONTINUED | OUTPATIENT
Start: 2022-12-14 | End: 2023-01-11 | Stop reason: HOSPADM

## 2022-12-14 RX ORDER — TRAZODONE HYDROCHLORIDE 50 MG/1
50 TABLET ORAL
Status: DISCONTINUED | OUTPATIENT
Start: 2022-12-14 | End: 2023-01-11 | Stop reason: HOSPADM

## 2022-12-14 RX ORDER — NICOTINE 21 MG/24HR
1 PATCH, TRANSDERMAL 24 HOURS TRANSDERMAL DAILY
Status: DISCONTINUED | OUTPATIENT
Start: 2022-12-15 | End: 2023-01-11 | Stop reason: HOSPADM

## 2022-12-14 RX ORDER — PANTOPRAZOLE SODIUM 40 MG/1
40 TABLET, DELAYED RELEASE ORAL
Status: DISCONTINUED | OUTPATIENT
Start: 2022-12-15 | End: 2023-01-11 | Stop reason: HOSPADM

## 2022-12-14 RX ORDER — AMOXICILLIN 250 MG
1 CAPSULE ORAL DAILY PRN
Status: DISCONTINUED | OUTPATIENT
Start: 2022-12-14 | End: 2023-01-11 | Stop reason: HOSPADM

## 2022-12-14 RX ORDER — CLOZAPINE 200 MG/1
200 TABLET ORAL
Status: DISCONTINUED | OUTPATIENT
Start: 2022-12-14 | End: 2022-12-16

## 2022-12-14 RX ORDER — OLANZAPINE 5 MG/1
5 TABLET ORAL
Status: DISCONTINUED | OUTPATIENT
Start: 2022-12-14 | End: 2022-12-27

## 2022-12-14 RX ORDER — HYDROXYZINE 50 MG/1
50 TABLET, FILM COATED ORAL
Status: DISCONTINUED | OUTPATIENT
Start: 2022-12-14 | End: 2023-01-11 | Stop reason: HOSPADM

## 2022-12-14 RX ORDER — FENOFIBRATE 145 MG/1
145 TABLET, COATED ORAL DAILY
Status: DISCONTINUED | OUTPATIENT
Start: 2022-12-15 | End: 2023-01-11 | Stop reason: HOSPADM

## 2022-12-14 RX ORDER — LORAZEPAM 2 MG/ML
2 INJECTION INTRAMUSCULAR EVERY 6 HOURS PRN
Status: DISCONTINUED | OUTPATIENT
Start: 2022-12-14 | End: 2023-01-11 | Stop reason: HOSPADM

## 2022-12-14 RX ORDER — DOCUSATE SODIUM 100 MG/1
100 CAPSULE, LIQUID FILLED ORAL 2 TIMES DAILY
Status: DISCONTINUED | OUTPATIENT
Start: 2022-12-14 | End: 2023-01-11 | Stop reason: HOSPADM

## 2022-12-14 RX ORDER — MAGNESIUM HYDROXIDE/ALUMINUM HYDROXICE/SIMETHICONE 120; 1200; 1200 MG/30ML; MG/30ML; MG/30ML
30 SUSPENSION ORAL EVERY 4 HOURS PRN
Status: DISCONTINUED | OUTPATIENT
Start: 2022-12-14 | End: 2022-12-15

## 2022-12-14 RX ORDER — GLYCOPYRROLATE 1 MG/1
1 TABLET ORAL 2 TIMES DAILY
Status: DISCONTINUED | OUTPATIENT
Start: 2022-12-14 | End: 2023-01-10

## 2022-12-14 RX ORDER — LORATADINE 10 MG/1
10 TABLET ORAL DAILY
Status: DISCONTINUED | OUTPATIENT
Start: 2022-12-15 | End: 2023-01-10

## 2022-12-14 RX ORDER — LIDOCAINE 50 MG/G
1 PATCH TOPICAL DAILY
Status: DISCONTINUED | OUTPATIENT
Start: 2022-12-15 | End: 2023-01-11 | Stop reason: HOSPADM

## 2022-12-14 RX ORDER — OLANZAPINE 2.5 MG/1
2.5 TABLET ORAL
Status: DISCONTINUED | OUTPATIENT
Start: 2022-12-14 | End: 2022-12-27

## 2022-12-14 RX ORDER — AMOXICILLIN 250 MG
1 CAPSULE ORAL
Status: DISCONTINUED | OUTPATIENT
Start: 2022-12-14 | End: 2022-12-24

## 2022-12-14 RX ORDER — PROPRANOLOL HYDROCHLORIDE 10 MG/1
10 TABLET ORAL EVERY 8 HOURS PRN
Status: DISCONTINUED | OUTPATIENT
Start: 2022-12-14 | End: 2023-01-11 | Stop reason: HOSPADM

## 2022-12-14 RX ORDER — POLYETHYLENE GLYCOL 3350 17 G/17G
17 POWDER, FOR SOLUTION ORAL DAILY PRN
Status: DISCONTINUED | OUTPATIENT
Start: 2022-12-14 | End: 2022-12-14 | Stop reason: SDUPTHER

## 2022-12-14 RX ORDER — BENZTROPINE MESYLATE 1 MG/1
1 TABLET ORAL
Status: DISCONTINUED | OUTPATIENT
Start: 2022-12-14 | End: 2023-01-11 | Stop reason: HOSPADM

## 2022-12-14 RX ORDER — VENLAFAXINE HYDROCHLORIDE 150 MG/1
150 CAPSULE, EXTENDED RELEASE ORAL DAILY
Status: DISCONTINUED | OUTPATIENT
Start: 2022-12-15 | End: 2023-01-04

## 2022-12-14 RX ORDER — HYDROXYZINE HYDROCHLORIDE 25 MG/1
25 TABLET, FILM COATED ORAL
Status: DISCONTINUED | OUTPATIENT
Start: 2022-12-14 | End: 2023-01-11 | Stop reason: HOSPADM

## 2022-12-14 RX ADMIN — FENOFIBRATE 145 MG: 145 TABLET, COATED ORAL at 08:46

## 2022-12-14 RX ADMIN — PANTOPRAZOLE SODIUM 40 MG: 40 TABLET, DELAYED RELEASE ORAL at 08:47

## 2022-12-14 RX ADMIN — GLYCOPYRROLATE 1 MG: 1 TABLET ORAL at 17:52

## 2022-12-14 RX ADMIN — FLUTICASONE PROPIONATE 1 SPRAY: 50 SPRAY, METERED NASAL at 17:52

## 2022-12-14 RX ADMIN — LORAZEPAM 1 MG: 1 TABLET ORAL at 21:04

## 2022-12-14 RX ADMIN — FLUTICASONE PROPIONATE 1 SPRAY: 50 SPRAY, METERED NASAL at 09:02

## 2022-12-14 RX ADMIN — DOCUSATE SODIUM 100 MG: 100 CAPSULE, LIQUID FILLED ORAL at 17:51

## 2022-12-14 RX ADMIN — VENLAFAXINE HYDROCHLORIDE 150 MG: 75 CAPSULE, EXTENDED RELEASE ORAL at 08:47

## 2022-12-14 RX ADMIN — HYDROCORTISONE: 25 CREAM TOPICAL at 09:03

## 2022-12-14 RX ADMIN — LORATADINE 10 MG: 10 TABLET ORAL at 08:47

## 2022-12-14 RX ADMIN — LORAZEPAM 0.5 MG: 0.5 TABLET ORAL at 08:49

## 2022-12-14 RX ADMIN — DOCUSATE SODIUM 100 MG: 100 CAPSULE, LIQUID FILLED ORAL at 08:46

## 2022-12-14 RX ADMIN — OLANZAPINE 10 MG: 10 TABLET, FILM COATED ORAL at 17:55

## 2022-12-14 RX ADMIN — ALBUTEROL SULFATE 2 PUFF: 90 AEROSOL, METERED RESPIRATORY (INHALATION) at 09:03

## 2022-12-14 RX ADMIN — LIDOCAINE 1 PATCH: 50 PATCH TOPICAL at 08:45

## 2022-12-14 RX ADMIN — OLANZAPINE 10 MG: 10 TABLET, FILM COATED ORAL at 10:44

## 2022-12-14 RX ADMIN — Medication 1000 UNITS: at 08:46

## 2022-12-14 RX ADMIN — SENNOSIDES AND DOCUSATE SODIUM 1 TABLET: 50; 8.6 TABLET ORAL at 21:03

## 2022-12-14 RX ADMIN — CLOZAPINE 75 MG: 25 TABLET ORAL at 15:00

## 2022-12-14 RX ADMIN — CLOZAPINE 200 MG: 200 TABLET ORAL at 21:04

## 2022-12-14 RX ADMIN — HYDROXYZINE HYDROCHLORIDE 25 MG: 25 TABLET, FILM COATED ORAL at 10:44

## 2022-12-14 RX ADMIN — CLOZAPINE 75 MG: 25 TABLET ORAL at 08:46

## 2022-12-14 RX ADMIN — NICOTINE 1 PATCH: 21 PATCH, EXTENDED RELEASE TRANSDERMAL at 08:49

## 2022-12-14 NOTE — NURSING NOTE
PRN medications effective  Pt visibly more calm  Free from outbursts  Spends time in the dayroom quietly sitting w/ peers   states, "I do feel a lot better now, thank you "

## 2022-12-14 NOTE — SOCIAL WORK
Jordan Ansari (ACT Team) called to confirm which unit pt is transferring to at [facility] provided this information  Call ended mutually   [phone]

## 2022-12-14 NOTE — PROGRESS NOTES
12/14/22 1668   Activity/Group Checklist   Group   (Morning Community Meeting/Daily Check-In)   Attendance Attended   Attendance Duration (min) 46-60   Interactions Interacted appropriately   Affect/Mood Appropriate;Bright;Calm   Goals Achieved Identified feelings; Identified relapse prevention strategies; Discussed coping strategies; Increased hopefulness; Identified resources and support systems; Able to listen to others; Able to engage in interactions; Able to reflect/comment on own behavior;Able to manage/cope with feelings

## 2022-12-14 NOTE — PROGRESS NOTES
Progress Note - Sheryle Foster 47 y o  female MRN: [id number]    Unit/Bed#: [facility] 247-01 Encounter: [id number]        Subjective:   Patient seen and examined at bedside after reviewing the chart and discussing the case with the caring staff  Patient examined at bedside  Patient reports no acute symptoms  Patient is a possible discharge today 12/14/2022 to [facility] for ECT  Patient will not be needing any scripts  I reviewed and reconciled patient's problem list and medications  Physical Exam   Vitals: Blood pressure 120/83, pulse 104, temperature 98 1 °F (36 7 °C), temperature source Temporal, resp  rate 18, height 5' 1" (1 549 m), weight 71 2 kg (157 lb), SpO2 94 %, not currently breastfeeding  ,Body mass index is 29 66 kg/m²  Constitutional: Patient in no acute distress  HEENT: PERR, EOMI, MMM  Cardiovascular: Normal rate and regular rhythm  Pulmonary/Chest: Effort normal and breath sounds normal    Abdomen: Nondistended, +BS, soft, NT  Assessment/Plan:  Sheryle Foster is a 47y o  year old female with schizoaffective disorder  Medical clearance  Patient is medically cleared for discharge  Patient does not need any scripts as she will be transferred to [facility] for ECT  MEDICAL CLEARANCE FOR ECT:   After reviewing patient's medical conditions for absolute or relative contraindication for ECT and recently done MRI of the brain without contrast on 8/2/2022 patient is medically cleared for ECT as patient has no mass-effect on MRI of the brain and no absolute or relative contraindications due to her medical conditions  1  GERD   Continue Protonix 40 mg daily, Mylanta as needed  2  Tobacco abuse   Patient is on nicotine transdermal patch 21 mg/24 hr   3  Hyperlipidemia   Continue fenofibrate 145 mg daily  4  Constipation   Start Colace 100 mg twice daily  Patient may take MiraLax as needed  5  Asthma   Patient may use albuterol inhaler as needed      6   Allergic rhinitis  Patient is on Claritin 10 mg daily and Flonase nasal spray twice daily  7  Vitamin-D deficiency  Continue vitamin D3 1000 units daily     8  Hemorrhoids   Patient may use Anusol cream as needed  9  Concern for STI  HIV and CT/GC negative, RPR nonreactive  10  Chronic low back pain  Tylenol as needed  Add lidocaine patch daily

## 2022-12-14 NOTE — SOCIAL WORK
Round trip transport requested for transfer to [facility] 3P today 2pm  Pt, tx team notified  Nursing provided face sheet, ect consent form, lables, medical necessity form

## 2022-12-14 NOTE — PROGRESS NOTES
12/14/22   Team Meeting   Meeting Type Daily Rounds   Team Members Present   Team Members Present Physician;;Nurse   Physician Team Member Dr Sofía Vaughan, DO; HOSP DR RADHA SILVA, [address] [place] [address]   Nursing Team Member Madonna Jacobson, YUDY; Nena Meyers, RN   Social Work Team Member Nely Watts [place]   Patient/Family Present   Patient Present No   Patient's Family Present No     Dc to ECT today, covid negative

## 2022-12-14 NOTE — BH TRANSITION RECORD
Contact Information: If you have any questions, concerns, pended studies, tests and/or procedures, or emergencies regarding your inpatient behavioral health visit  Please contact Paulie Branch" [facility] behavioral health unit [phone] and ask to speak to a , nurse or physician  A contact is available 24 hours/ 7 days a week at this number  Summary of Procedures Performed During your Stay:  Below is a list of major procedures performed during your hospital stay and a summary of results:  - No major procedures performed  Pending Studies (From admission, onward)     Start     Ordered    12/08/22 0600  CBC and differential  Every Thursday      Start Status   12/15/22 0600 Scheduled   12/22/22 0600 Scheduled       12/06/22 1332    Signed and Held  Comprehensive metabolic panel  Morning draw         Signed and Held    Signed and Held  CBC and differential  Every Thursday       Signed and Held              Please follow up on the above pending studies with your PCP and/or referring provider

## 2022-12-14 NOTE — TRANSPORTATION MEDICAL NECESSITY
Section I - General Information    Name of Patient: Qi Savage                 : 1970    Medicare #: [id number]  Transport Date: 22 (PCS is valid for round trips on this date and for all repetitive trips in the 60-day range as noted below )  Origin: [address] [place] [facility]jit: [facility]P [facility], [address] [place] [address]     Is the pt's stay covered under Medicare Part A (PPS/DRG)   []     Closest appropriate facility? If no, why is transport to more distant facility required? Yes  If hospice pt, is this transport related to pt's terminal illness? NA       Section II - Medical Necessity Questionnaire  Ambulance transportation is medically necessary only if other means of transport are contraindicated or would be potentially harmful to the patient  To meet this requirement, the patient must either be "bed confined" or suffer from a condition such that transport by means other than ambulance is contraindicated by the patient's condition  The following questions must be answered by the medical professional signing below for this form to be valid:    1)  Describe the MEDICAL CONDITION (physical and/or mental) of this patient AT [address] [place] [address] that requires the patient to be transported in an ambulance and why transport by other means is contraindicated by the patient's condition:psych to psych transfer, elopement risk    2) Is the patient "bed confined" as defined below? Yes  To be "be confined" the patient must satisfy all three of the following conditions: (1) unable to get up from bed without Assistance; AND (2) unable to ambulate; AND (3) unable to sit in a chair or wheelchair  3) Can this patient safely be transported by car or wheelchair van (i e , seated during transport without a medical attendant or monitoring)?    No    4) In addition to completing questions 1-3 above, please check any of the following conditions that apply*:   *Note: supporting documentation for any boxes checked must be maintained in the patient's medical records  If hosp-hosp transfer, describe services needed at 2nd facility not available at 1st facility? Other(specify) psych to psych transfer, elopement risks      Section III - Signature of Physician or Healthcare Professional  I certify that the above information is true and correct based on my evaluation of this patient, and represent that the patient requires transport by ambulance and that other forms of transport are contraindicated  I understand that this information will be used by the Centers for Medicare and Medicaid Services (CMS) to support the determination of medical necessity for ambulance services, and I represent that I have personal knowledge of the patient's condition at time of transport  [x]  If this box is checked, I also certify that the patient is physically or mentally incapable of signing the ambulance service's claim and that the institution with which I am affiliated has furnished care, services, or assistance to the patient  My signature below is made on behalf of the patient pursuant to 42 CFR §424 36(b)(4)  In accordance with 42 CFR §424 37, the specific reason(s) that the patient is physically or mentally incapable of signing the claim form is as follows: Dionna Szymanski  Signature of Physician* or Healthcare Professional______________________________________________________________  Signature Date 12/14/22 (For scheduled repetitive transports, this form is not valid for transports performed more than 60 days after this date)    Printed Name & Credentials of Physician or Healthcare Professional (MD, DO, RN, etc )________________________________  *Form must be signed by patient's attending physician for scheduled, repetitive transports   For non-repetitive, unscheduled ambulance transports, if unable to obtain the signature of the attending physician, any of the following may sign (choose appropriate option below)  [] Physician Assistant []  Clinical Nurse Specialist []  Registered Nurse  []  Nurse Practitioner  [x] Discharge Planner

## 2022-12-14 NOTE — PROGRESS NOTES
12/14/22 1000   Activity/Group Checklist   Group   (Creative Expression Self-Reflection)   Attendance Attended   Attendance Duration (min) Greater than 60   Interactions Interacted appropriately   Affect/Mood Appropriate   Goals Achieved Identified feelings; Identified triggers; Identified relapse prevention strategies; Discussed coping strategies; Discussed discharge plans; Increased hopefulness; Identified resources and support systems; Able to listen to others; Able to engage in interactions; Able to reflect/comment on own behavior;Able to manage/cope with feelings

## 2022-12-14 NOTE — NURSING NOTE
Patient is being discharged today with the following belongings:  x5 pants  x4 shirts  x3 tankops  x1 short  x7 underwear  x8 socks  x1 hoodie  x1 blanket  x1 cardigan   x2 bras  x1 slippers  x1 jacket  x1 robe  x1 shoes  x1 boots  x2 long sleeves  x1 sweater  x1 pillow  x2 books  x1 soap  x1 brush  x1 mini bag of cosmetics  x1 purse  x1 car   x1 cellphone  x1   x1 keys  Loose change  x1 birth certificate   x1 social security card   x1 wallet with PA ID, 4 healthcare cards, 2 debit    Reviewed with patient and signed personal belonging check list

## 2022-12-14 NOTE — PLAN OF CARE
Problem: Alteration in Thoughts and Perception  Goal: Treatment Goal: Gain control of psychotic behaviors/thinking, reduce/eliminate presenting symptoms and demonstrate improved reality functioning upon discharge  Outcome: Not Progressing  Goal: Verbalize thoughts and feelings  Description: Interventions:  - Promote a nonjudgmental and trusting relationship with the patient through active listening and therapeutic communication  - Assess patient's level of functioning, behavior and potential for risk  - Engage patient in 1 on 1 interactions  - Encourage patient to express fears, feelings, frustrations, and discuss symptoms    - [place] patient to reality, help patient recognize reality-based thinking   - Administer medications as ordered and assess for potential side effects  - Provide the patient education related to the signs and symptoms of the illness and desired effects of prescribed medications  Outcome: Not Progressing  Goal: Refrain from acting on delusional thinking/internal stimuli  Description: Interventions:  - Monitor patient closely, per order   - Utilize least restrictive measures   - Set reasonable limits, give positive feedback for acceptable   - Administer medications as ordered and monitor of potential side effects  Outcome: Not Progressing     Problem: Ineffective Coping  Goal: Cooperates with admission process  Description: Interventions:   - Complete admission process  Outcome: Not Progressing  Goal: Identifies ineffective coping skills  Outcome: Not Progressing  Goal: Identifies healthy coping skills  Outcome: Not Progressing  Goal: Demonstrates healthy coping skills  Outcome: Not Progressing     Problem: Depression  Goal: Treatment Goal: Demonstrate behavioral control of depressive symptoms, verbalize feelings of improved mood/affect, and adopt new coping skills prior to discharge  Outcome: Not Progressing  Goal: Refrain from harming self  Description: Interventions:  - Monitor patient closely, per order   - Supervise medication ingestion, monitor effects and side effects   Outcome: Not Progressing  Goal: Refrain from isolation  Description: Interventions:  - Develop a trusting relationship   - Encourage socialization   Outcome: Not Progressing  Goal: Refrain from self-neglect  Outcome: Not Progressing     Problem: Anxiety  Goal: Anxiety is at manageable level  Description: Interventions:  - Assess and monitor patient's anxiety level  - Monitor for signs and symptoms (heart palpitations, chest pain, shortness of breath, headaches, nausea, feeling jumpy, restlessness, irritable, apprehensive)  - Collaborate with interdisciplinary team and initiate plan and interventions as ordered    - [place] patient to unit/surroundings  - Explain treatment plan  - Encourage participation in care  - Encourage verbalization of concerns/fears  - Identify coping mechanisms  - Assist in developing anxiety-reducing skills  - Administer/offer alternative therapies  - Limit or eliminate stimulants  Outcome: Not Progressing     Problem: Alteration in Orientation  Goal: Treatment Goal: Demonstrate a reduction of confusion and improved orientation to person, place, time and/or situation upon discharge, according to optimum baseline/ability  Outcome: Not Progressing

## 2022-12-14 NOTE — NURSING NOTE
Pt cooperative w/ care    Pleasant w/staff on approach  Denies SI/HI  Endorses A/acharya/delusions that people are "after me, touching me, trying to make me think I'm ugly and need a face lift " Pt describes multiple delusions regarding her family that she knows "are not real, but I'm tired of being tormented"  Pt becomes agitated and anxious regarding psychosis, and is heard yelling out in the halls at times  Pt requests PRN Atarax, and Zyprexa; stating, "the two together work well for me, but 50mg of atarax is too much  It makes me sleepy  " 10mg Zyprexa administered w/ 25mg atarax PO  Pt is given the tablet to listen to music quietly as a coping skill until medications begin to work  Will monitor for effectiveness of medications and offer support as needed

## 2022-12-14 NOTE — NURSING NOTE
Patient visible on unit  Patient excited for discharge tomorrow  Patient social with staff and select peers  Patient did not report any hallucinations  Patient requested prn trazodone and robaxin  Denies SI,HI,AVH, anxiety or depression  Safety checks continue Q 7 minutes

## 2022-12-14 NOTE — PLAN OF CARE
Problem: Alteration in Thoughts and Perception  Goal: Treatment Goal: Gain control of psychotic behaviors/thinking, reduce/eliminate presenting symptoms and demonstrate improved reality functioning upon discharge  12/14/2022 1635 by Liliane Sheldon RN  Outcome: Not Progressing  12/14/2022 1634 by Liliane Sheldon RN  Outcome: Not Progressing  Goal: Verbalize thoughts and feelings  Description: Interventions:  - Promote a nonjudgmental and trusting relationship with the patient through active listening and therapeutic communication  - Assess patient's level of functioning, behavior and potential for risk  - Engage patient in 1 on 1 interactions  - Encourage patient to express fears, feelings, frustrations, and discuss symptoms    - [place] patient to reality, help patient recognize reality-based thinking   - Administer medications as ordered and assess for potential side effects  - Provide the patient education related to the signs and symptoms of the illness and desired effects of prescribed medications  12/14/2022 1635 by Liliane Sheldon RN  Outcome: Not Progressing  12/14/2022 1634 by Liliane Sheldon RN  Outcome: Not Progressing  Goal: Refrain from acting on delusional thinking/internal stimuli  Description: Interventions:  - Monitor patient closely, per order   - Utilize least restrictive measures   - Set reasonable limits, give positive feedback for acceptable   - Administer medications as ordered and monitor of potential side effects  12/14/2022 1635 by Liliane Sheldon RN  Outcome: Not Progressing  12/14/2022 1634 by Liliane Sheldon RN  Outcome: Not Progressing     Problem: Ineffective Coping  Goal: Cooperates with admission process  Description: Interventions:   - Complete admission process  12/14/2022 1635 by Liliane Sheldon RN  Outcome: Not Progressing  12/14/2022 1634 by Liliane Sheldon RN  Outcome: Not Progressing  Goal: Identifies ineffective coping skills  12/14/2022 1635 by Liliane Sheldon RN  Outcome: Not Progressing  12/14/2022 1634 by Adan Islas RN  Outcome: Not Progressing  Goal: Identifies healthy coping skills  12/14/2022 1635 by Adan Islas RN  Outcome: Not Progressing  12/14/2022 1634 by Adan Islas RN  Outcome: Not Progressing  Goal: Demonstrates healthy coping skills  12/14/2022 1635 by Adan Islas RN  Outcome: Not Progressing  12/14/2022 1634 by Adan Islas RN  Outcome: Not Progressing     Problem: Depression  Goal: Treatment Goal: Demonstrate behavioral control of depressive symptoms, verbalize feelings of improved mood/affect, and adopt new coping skills prior to discharge  12/14/2022 1635 by Adan Islas RN  Outcome: Not Progressing  12/14/2022 1634 by Adan Islas RN  Outcome: Not Progressing  Goal: Refrain from harming self  Description: Interventions:  - Monitor patient closely, per order   - Supervise medication ingestion, monitor effects and side effects   12/14/2022 1635 by Adan Islas RN  Outcome: Not Progressing  12/14/2022 1634 by Adan Islas RN  Outcome: Not Progressing  Goal: Refrain from isolation  Description: Interventions:  - Develop a trusting relationship   - Encourage socialization   12/14/2022 1635 by Adan Islas RN  Outcome: Not Progressing  12/14/2022 1634 by Adan Islas RN  Outcome: Not Progressing  Goal: Refrain from self-neglect  12/14/2022 1635 by Adan Islas RN  Outcome: Not Progressing  12/14/2022 1634 by Adan Islas RN  Outcome: Not Progressing     Problem: Anxiety  Goal: Anxiety is at manageable level  Description: Interventions:  - Assess and monitor patient's anxiety level  - Monitor for signs and symptoms (heart palpitations, chest pain, shortness of breath, headaches, nausea, feeling jumpy, restlessness, irritable, apprehensive)  - Collaborate with interdisciplinary team and initiate plan and interventions as ordered    - [place] patient to unit/surroundings  - Explain treatment plan  - Encourage participation in care  - Encourage verbalization of concerns/fears  - Identify coping mechanisms  - Assist in developing anxiety-reducing skills  - Administer/offer alternative therapies  - Limit or eliminate stimulants  12/14/2022 1635 by Felton Sacks, RN  Outcome: Not Progressing  12/14/2022 1634 by Felton Sacks, RN  Outcome: Not Progressing     Problem: Alteration in Orientation  Goal: Treatment Goal: Demonstrate a reduction of confusion and improved orientation to person, place, time and/or situation upon discharge, according to optimum baseline/ability  12/14/2022 1635 by Felton Sacks, RN  Outcome: Not Progressing  12/14/2022 1634 by Felton Sacks, RN  Outcome: Not Progressing     Problem: Alteration in Thoughts and Perception  Goal: Treatment Goal: Gain control of psychotic behaviors/thinking, reduce/eliminate presenting symptoms and demonstrate improved reality functioning upon discharge  12/14/2022 1635 by Felton Sacks, RN  Outcome: Not Progressing  12/14/2022 1634 by Felton Sacks, RN  Outcome: Not Progressing  Goal: Verbalize thoughts and feelings  Description: Interventions:  - Promote a nonjudgmental and trusting relationship with the patient through active listening and therapeutic communication  - Assess patient's level of functioning, behavior and potential for risk  - Engage patient in 1 on 1 interactions  - Encourage patient to express fears, feelings, frustrations, and discuss symptoms    - [place] patient to reality, help patient recognize reality-based thinking   - Administer medications as ordered and assess for potential side effects  - Provide the patient education related to the signs and symptoms of the illness and desired effects of prescribed medications  12/14/2022 1635 by Felton Sacks, RN  Outcome: Not Progressing  12/14/2022 1634 by Felton Sacks, RN  Outcome: Not Progressing  Goal: Refrain from acting on delusional thinking/internal stimuli  Description: Interventions:  - Monitor patient closely, per order   - Utilize least restrictive measures   - Set reasonable limits, give positive feedback for acceptable   - Administer medications as ordered and monitor of potential side effects  12/14/2022 1635 by Mic Wisdom RN  Outcome: Not Progressing  12/14/2022 1634 by Mic Wisdom RN  Outcome: Not Progressing  Goal: Agree to be compliant with medication regime, as prescribed and report medication side effects  Description: Interventions:  - Offer appropriate PRN medication and supervise ingestion; conduct AIMS, as needed   12/14/2022 1635 by Mic Wisdom RN  Outcome: Not Progressing  12/14/2022 1634 by Mic Wisdom RN  Outcome: Not Progressing  Goal: Attend and participate in unit activities, including therapeutic, recreational, and educational groups  Description: Interventions:  -Encourage Visitation and family involvement in care  12/14/2022 1635 by Mic Wisdom RN  Outcome: Not Progressing  12/14/2022 1634 by Mic Wisdom RN  Outcome: Not Progressing     Problem: Ineffective Coping  Goal: Cooperates with admission process  Description: Interventions:   - Complete admission process  12/14/2022 1635 by Mic Wisdom RN  Outcome: Not Progressing  12/14/2022 1634 by Mic Wisdom RN  Outcome: Not Progressing  Goal: Participates in unit activities  Description: Interventions:  - Provide therapeutic environment   - Provide required programming   - Redirect inappropriate behaviors   12/14/2022 1635 by Mic Wisdom RN  Outcome: Not Progressing  12/14/2022 1634 by Mic Wisdom RN  Outcome: Not Progressing  Goal: Understands least restrictive measures  Description: Interventions:  - Utilize least restrictive behavior  12/14/2022 1635 by Mic Wisdom RN  Outcome: Not Progressing  12/14/2022 1634 by Mic Wisdom RN  Outcome: Not Progressing  Goal: Free from restraint events  Description: - Utilize least restrictive measures   - Provide behavioral interventions   - Redirect inappropriate behaviors   12/14/2022 1635 by Estephania Sheppard RN  Outcome: Not Progressing  12/14/2022 1634 by Estephania Sheppard RN  Outcome: Not Progressing     Problem: Depression  Goal: Refrain from harming self  Description: Interventions:  - Monitor patient closely, per order   - Supervise medication ingestion, monitor effects and side effects   12/14/2022 1635 by Estephania Sheppard RN  Outcome: Not Progressing  12/14/2022 1634 by Estephania Sheppard RN  Outcome: Not Progressing  Goal: Refrain from isolation  Description: Interventions:  - Develop a trusting relationship   - Encourage socialization   12/14/2022 1635 by Estephania Sheppard RN  Outcome: Not Progressing  12/14/2022 1634 by Estephania Sheppard RN  Outcome: Not Progressing     Problem: Alteration in Orientation  Goal: Interact with staff daily  Description: Interventions:  - Assess and re-assess patient's level of orientation  - Engage patient in 1 on 1 interactions, daily, for a minimum of 15 minutes   - Establish rapport/trust with patient   12/14/2022 1635 by Estephania Sheppard RN  Outcome: Not Progressing  12/14/2022 1634 by Estephania Sheppard RN  Outcome: Not Progressing  Goal: Cooperate with recommended testing/procedures  Description: Interventions:  - Determine need for ancillary testing  - Observe for mental status changes  - Implement falls/precaution protocol   12/14/2022 1635 by Estephania Sheppard RN  Outcome: Not Progressing  12/14/2022 1634 by Estephania Sheppard RN  Outcome: Not Progressing  Goal: Attend and participate in unit activities, including therapeutic, recreational, and educational groups  Description: Interventions:  - Provide therapeutic and educational activities daily, encourage attendance and participation, and document same in the medical record   - Provide appropriate opportunities for reminiscence   - Provide a consistent daily routine   - Encourage family contact/visitation   12/14/2022 1635 by Estephania Sheppard RN  Outcome: Not Progressing  12/14/2022 1634 by Estephania Sheppard RN  Outcome: Not Progressing     Problem: Electroconvulsive therapy (ECT)  Goal: Treatment Goal: Demonstrate a reduction of confusion and improved orientation to person, place, time and/or situation upon discharge, according to optimum baseline/ability  12/14/2022 1635 by Estephania Sheppard RN  Outcome: Not Progressing  12/14/2022 1634 by Estephania Sheppard RN  Outcome: Not Progressing  Goal: Verbalizes/displays adequate comfort level or baseline comfort level  Description: Interventions:  - Encourage patient to monitor pain and request assistance  - Assess pain using appropriate pain scale  - Administer analgesics based on type and severity of pain and evaluate response  - Implement non-pharmacological measures as appropriate and evaluate response  - Consider cultural and social influences on pain and pain management  - Notify physician/advanced practitioner if interventions unsuccessful or patient reports new pain  12/14/2022 1635 by Estephania Sheppard RN  Outcome: Not Progressing  12/14/2022 1634 by Estephania Sheppard RN  Outcome: Not Progressing  Goal: Achieves maximal functionality and self care  Description: INTERVENTIONS  - Monitor swallowing and airway patency with patient fatigue and changes in neurological status  - Encourage and assist patient to increase activity and self care     - Encourage visually impaired, hearing impaired and aphasic patients to use assistive/communication devices  12/14/2022 1635 by Estephania Sheppard RN  Outcome: Not Progressing  12/14/2022 1634 by Estephania Sheppard RN  Outcome: Not Progressing  Goal: Maintain or return mobility to safest level of function  Description: INTERVENTIONS:  - Assess patient's ability to carry out ADLs; assess patient's baseline for ADL function and identify physical deficits which impact ability to perform ADLs (bathing, care of mouth/teeth, toileting, grooming, dressing, etc )  - Assess/evaluate cause of self-care deficits   - Assess range of motion  - Assess patient's mobility  - Assess patient's need for assistive devices and provide as appropriate  - Encourage maximum independence but intervene and supervise when necessary  - Involve family in performance of ADLs  - Assess for home care needs following discharge   - Consider OT consult to assist with ADL evaluation and planning for discharge  - Provide patient education as appropriate  12/14/2022 1635 by Jozef Rocha RN  Outcome: Not Progressing  12/14/2022 1634 by Jozef Rocha RN  Outcome: Not Progressing  Goal: Minimal or absence of nausea and/or vomiting  Description: INTERVENTIONS:  - Administer IV fluids if ordered to ensure adequate hydration  - Maintain NPO status until nausea and vomiting are resolved  - Nasogastric tube if ordered  - Administer ordered antiemetic medications as needed  - Provide nonpharmacologic comfort measures as appropriate  - Advance diet as tolerated, if ordered  - Consider nutrition services referral to assist patient with adequate nutrition and appropriate food choices  12/14/2022 1635 by Jozef Rocha RN  Outcome: Not Progressing  12/14/2022 1634 by Jozef Rocha RN  Outcome: Not Progressing

## 2022-12-14 NOTE — NURSING NOTE
Pt calm and cooperative throughout DC process  1600 scheduled [place]  Administered  Pt describes feeling nervous but hopeful about transfer for ECT  Pt states, " I did ECT once before, but did not finish  This time I plan to stay because I really want to get better  I am so tired of being tormented " Staff support and encouragement provided  Medication education provided  Pt walked off unit by staff  All belongings in hand and signed for

## 2022-12-14 NOTE — PLAN OF CARE
Problem: Alteration in Thoughts and Perception  Goal: Treatment Goal: Gain control of psychotic behaviors/thinking, reduce/eliminate presenting symptoms and demonstrate improved reality functioning upon discharge  Outcome: Progressing  Goal: Verbalize thoughts and feelings  Description: Interventions:  - Promote a nonjudgmental and trusting relationship with the patient through active listening and therapeutic communication  - Assess patient's level of functioning, behavior and potential for risk  - Engage patient in 1 on 1 interactions  - Encourage patient to express fears, feelings, frustrations, and discuss symptoms    - [place] patient to reality, help patient recognize reality-based thinking   - Administer medications as ordered and assess for potential side effects  - Provide the patient education related to the signs and symptoms of the illness and desired effects of prescribed medications  Outcome: Progressing  Goal: Refrain from acting on delusional thinking/internal stimuli  Description: Interventions:  - Monitor patient closely, per order   - Utilize least restrictive measures   - Set reasonable limits, give positive feedback for acceptable   - Administer medications as ordered and monitor of potential side effects  Outcome: Progressing     Problem: Ineffective Coping  Goal: Cooperates with admission process  Description: Interventions:   - Complete admission process  Outcome: Progressing  Goal: Identifies healthy coping skills  Outcome: Progressing  Goal: Demonstrates healthy coping skills  Outcome: Progressing  Goal: Participates in unit activities  Description: Interventions:  - Provide therapeutic environment   - Provide required programming   - Redirect inappropriate behaviors   Outcome: Progressing     Problem: Depression  Goal: Treatment Goal: Demonstrate behavioral control of depressive symptoms, verbalize feelings of improved mood/affect, and adopt new coping skills prior to discharge  Outcome: Progressing  Goal: Refrain from isolation  Description: Interventions:  - Develop a trusting relationship   - Encourage socialization   Outcome: Progressing  Goal: Refrain from self-neglect  Outcome: Progressing  Goal: Complete daily ADLs, including personal hygiene independently, as able  Description: Interventions:  - Observe, teach, and assist patient with ADLS  -  Monitor and promote a balance of rest/activity, with adequate nutrition and elimination   Outcome: Progressing     Problem: Anxiety  Goal: Anxiety is at manageable level  Description: Interventions:  - Assess and monitor patient's anxiety level  - Monitor for signs and symptoms (heart palpitations, chest pain, shortness of breath, headaches, nausea, feeling jumpy, restlessness, irritable, apprehensive)  - Collaborate with interdisciplinary team and initiate plan and interventions as ordered    - [place] patient to unit/surroundings  - Explain treatment plan  - Encourage participation in care  - Encourage verbalization of concerns/fears  - Identify coping mechanisms  - Assist in developing anxiety-reducing skills  - Administer/offer alternative therapies  - Limit or eliminate stimulants  Outcome: Progressing     Problem: Ineffective Coping  Goal: Participates in unit activities  Description: Interventions:  - Provide therapeutic environment   - Provide required programming   - Redirect inappropriate behaviors   Outcome: Progressing     Problem: DISCHARGE PLANNING - CARE MANAGEMENT  Goal: Discharge to post-acute care or home with appropriate resources  Description: INTERVENTIONS:  - Conduct assessment to determine patient/family and health care team treatment goals, and need for post-acute services based on payer coverage, community resources, and patient preferences, and barriers to discharge  - Address psychosocial, clinical, and financial barriers to discharge as identified in assessment in conjunction with the patient/family and health care team  - Arrange appropriate level of post-acute services according to patient’s   needs and preference and payer coverage in collaboration with the physician and health care team  - Communicate with and update the patient/family, physician, and health care team regarding progress on the discharge plan  - Arrange appropriate transportation to post-acute venues  Outcome: Progressing     Problem: Nutrition/Hydration-ADULT  Goal: Nutrient/Hydration intake appropriate for improving, restoring or maintaining nutritional needs  Description: Monitor and assess patient's nutrition/hydration status for malnutrition  Collaborate with interdisciplinary team and initiate plan and interventions as ordered  Monitor patient's weight and dietary intake as ordered or per policy  Utilize nutrition screening tool and intervene as necessary  Determine patient's food preferences and provide high-protein, high-caloric foods as appropriate       INTERVENTIONS:  - Monitor oral intake, urinary output, labs, and treatment plans  - Assess nutrition and hydration status and recommend course of action  - Evaluate amount of meals eaten  - Assist patient with eating if necessary   - Allow adequate time for meals  - Recommend/ encourage appropriate diets, oral nutritional supplements, and vitamin/mineral supplements  - Order, calculate, and assess calorie counts as needed  - Recommend, monitor, and adjust tube feedings and TPN/PPN based on assessed needs  - Assess need for intravenous fluids  - Provide specific nutrition/hydration education as appropriate  - Include patient/family/caregiver in decisions related to nutrition  Outcome: Progressing

## 2022-12-15 PROBLEM — G31.84 MILD NEUROCOGNITIVE DISORDER: Chronic | Status: ACTIVE | Noted: 2022-12-10

## 2022-12-15 LAB
ALBUMIN SERPL BCP-MCNC: 4.6 G/DL (ref 3.5–5)
ALP SERPL-CCNC: 69 U/L (ref 43–122)
ALT SERPL W P-5'-P-CCNC: 30 U/L
ANION GAP SERPL CALCULATED.3IONS-SCNC: 6 MMOL/L (ref 5–14)
AST SERPL W P-5'-P-CCNC: 31 U/L (ref 14–36)
ATRIAL RATE: 101 BPM
ATRIAL RATE: 105 BPM
ATRIAL RATE: 106 BPM
ATRIAL RATE: 95 BPM
BASOPHILS # BLD AUTO: 0.04 THOUSANDS/ÂΜL (ref 0–0.1)
BASOPHILS NFR BLD AUTO: 1 % (ref 0–1)
BILIRUB SERPL-MCNC: 0.38 MG/DL (ref 0.2–1)
BUN SERPL-MCNC: 16 MG/DL (ref 5–25)
CALCIUM SERPL-MCNC: 10 MG/DL (ref 8.4–10.2)
CHLORIDE SERPL-SCNC: 104 MMOL/L (ref 96–108)
CO2 SERPL-SCNC: 28 MMOL/L (ref 21–32)
CREAT SERPL-MCNC: 0.7 MG/DL (ref 0.6–1.2)
EOSINOPHIL # BLD AUTO: 0.4 THOUSAND/ÂΜL (ref 0–0.61)
EOSINOPHIL NFR BLD AUTO: 5 % (ref 0–6)
ERYTHROCYTE [DISTWIDTH] IN BLOOD BY AUTOMATED COUNT: 14.5 % (ref 11.6–15.1)
GFR SERPL CREATININE-BSD FRML MDRD: 100 ML/MIN/1.73SQ M
GLUCOSE P FAST SERPL-MCNC: 114 MG/DL (ref 70–99)
GLUCOSE SERPL-MCNC: 114 MG/DL (ref 70–99)
HCT VFR BLD AUTO: 43 % (ref 34.8–46.1)
HGB BLD-MCNC: 13.9 G/DL (ref 11.5–15.4)
IMM GRANULOCYTES # BLD AUTO: 0.04 THOUSAND/UL (ref 0–0.2)
IMM GRANULOCYTES NFR BLD AUTO: 1 % (ref 0–2)
LYMPHOCYTES # BLD AUTO: 1.8 THOUSANDS/ÂΜL (ref 0.6–4.47)
LYMPHOCYTES NFR BLD AUTO: 23 % (ref 14–44)
MCH RBC QN AUTO: 28.2 PG (ref 26.8–34.3)
MCHC RBC AUTO-ENTMCNC: 32.3 G/DL (ref 31.4–37.4)
MCV RBC AUTO: 87 FL (ref 82–98)
MONOCYTES # BLD AUTO: 0.52 THOUSAND/ÂΜL (ref 0.17–1.22)
MONOCYTES NFR BLD AUTO: 7 % (ref 4–12)
NEUTROPHILS # BLD AUTO: 4.92 THOUSANDS/ÂΜL (ref 1.85–7.62)
NEUTS SEG NFR BLD AUTO: 63 % (ref 43–75)
NRBC BLD AUTO-RTO: 0 /100 WBCS
P AXIS: 56 DEGREES
P AXIS: 58 DEGREES
P AXIS: 59 DEGREES
P AXIS: 60 DEGREES
PLATELET # BLD AUTO: 260 THOUSANDS/UL (ref 149–390)
PMV BLD AUTO: 9.5 FL (ref 8.9–12.7)
POTASSIUM SERPL-SCNC: 4.5 MMOL/L (ref 3.5–5.3)
PR INTERVAL: 156 MS
PR INTERVAL: 158 MS
PR INTERVAL: 164 MS
PR INTERVAL: 166 MS
PROT SERPL-MCNC: 7.7 G/DL (ref 6.4–8.4)
QRS AXIS: -1 DEGREES
QRS AXIS: -1 DEGREES
QRS AXIS: 0 DEGREES
QRS AXIS: 0 DEGREES
QRSD INTERVAL: 72 MS
QRSD INTERVAL: 72 MS
QRSD INTERVAL: 74 MS
QRSD INTERVAL: 74 MS
QT INTERVAL: 346 MS
QT INTERVAL: 350 MS
QT INTERVAL: 374 MS
QT INTERVAL: 384 MS
QTC INTERVAL: 457 MS
QTC INTERVAL: 464 MS
QTC INTERVAL: 482 MS
QTC INTERVAL: 484 MS
RBC # BLD AUTO: 4.93 MILLION/UL (ref 3.81–5.12)
SODIUM SERPL-SCNC: 138 MMOL/L (ref 135–147)
T WAVE AXIS: 50 DEGREES
T WAVE AXIS: 55 DEGREES
T WAVE AXIS: 70 DEGREES
T WAVE AXIS: 71 DEGREES
VENTRICULAR RATE: 101 BPM
VENTRICULAR RATE: 105 BPM
VENTRICULAR RATE: 106 BPM
VENTRICULAR RATE: 95 BPM
WBC # BLD AUTO: 7.72 THOUSAND/UL (ref 4.31–10.16)

## 2022-12-15 PROCEDURE — GZB4ZZZ OTHER ELECTROCONVULSIVE THERAPY: ICD-10-PCS | Performed by: PSYCHIATRY & NEUROLOGY

## 2022-12-15 RX ORDER — CLOZAPINE 100 MG/1
100 TABLET ORAL
Status: DISCONTINUED | OUTPATIENT
Start: 2022-12-15 | End: 2022-12-16

## 2022-12-15 RX ORDER — HYDROCORTISONE 25 MG/G
CREAM TOPICAL 4 TIMES DAILY PRN
Status: DISCONTINUED | OUTPATIENT
Start: 2022-12-15 | End: 2023-01-11 | Stop reason: HOSPADM

## 2022-12-15 RX ORDER — LORAZEPAM 0.5 MG/1
0.5 TABLET ORAL
Status: DISCONTINUED | OUTPATIENT
Start: 2022-12-15 | End: 2022-12-16

## 2022-12-15 RX ORDER — DIAPER,BRIEF,INFANT-TODD,DISP
EACH MISCELLANEOUS 4 TIMES DAILY PRN
Status: DISCONTINUED | OUTPATIENT
Start: 2022-12-15 | End: 2023-01-11 | Stop reason: HOSPADM

## 2022-12-15 RX ADMIN — METHOCARBAMOL 500 MG: 500 TABLET, FILM COATED ORAL at 21:01

## 2022-12-15 RX ADMIN — FLUTICASONE PROPIONATE 1 SPRAY: 50 SPRAY, METERED NASAL at 08:21

## 2022-12-15 RX ADMIN — OLANZAPINE 10 MG: 10 TABLET, FILM COATED ORAL at 10:21

## 2022-12-15 RX ADMIN — LORAZEPAM 0.5 MG: 0.5 TABLET ORAL at 08:21

## 2022-12-15 RX ADMIN — DOCUSATE SODIUM 100 MG: 100 CAPSULE, LIQUID FILLED ORAL at 08:21

## 2022-12-15 RX ADMIN — GLYCOPYRROLATE 1 MG: 1 TABLET ORAL at 17:30

## 2022-12-15 RX ADMIN — CHOLECALCIFEROL TAB 25 MCG (1000 UNIT) 1000 UNITS: 25 TAB at 08:21

## 2022-12-15 RX ADMIN — LORATADINE 10 MG: 10 TABLET ORAL at 08:21

## 2022-12-15 RX ADMIN — FLUTICASONE PROPIONATE 1 SPRAY: 50 SPRAY, METERED NASAL at 17:30

## 2022-12-15 RX ADMIN — PANTOPRAZOLE SODIUM 40 MG: 40 TABLET, DELAYED RELEASE ORAL at 08:21

## 2022-12-15 RX ADMIN — VENLAFAXINE HYDROCHLORIDE 150 MG: 150 CAPSULE, EXTENDED RELEASE ORAL at 08:21

## 2022-12-15 RX ADMIN — MAGNESIUM HYDROXIDE 30 ML: 400 SUSPENSION ORAL at 21:03

## 2022-12-15 RX ADMIN — FENOFIBRATE 145 MG: 145 TABLET, COATED ORAL at 08:21

## 2022-12-15 RX ADMIN — NICOTINE 1 PATCH: 21 PATCH, EXTENDED RELEASE TRANSDERMAL at 08:22

## 2022-12-15 RX ADMIN — CLOZAPINE 100 MG: 100 TABLET ORAL at 16:21

## 2022-12-15 RX ADMIN — CLOZAPINE 200 MG: 200 TABLET ORAL at 21:02

## 2022-12-15 RX ADMIN — SENNOSIDES AND DOCUSATE SODIUM 1 TABLET: 50; 8.6 TABLET ORAL at 21:02

## 2022-12-15 RX ADMIN — GLYCOPYRROLATE 1 MG: 1 TABLET ORAL at 08:22

## 2022-12-15 RX ADMIN — LIDOCAINE 1 PATCH: 50 PATCH CUTANEOUS at 08:22

## 2022-12-15 RX ADMIN — DOCUSATE SODIUM 100 MG: 100 CAPSULE, LIQUID FILLED ORAL at 17:30

## 2022-12-15 RX ADMIN — CLOZAPINE 75 MG: 25 TABLET ORAL at 08:21

## 2022-12-15 RX ADMIN — LORAZEPAM 0.5 MG: 0.5 TABLET ORAL at 21:02
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
## 2022-12-15 NOTE — ASSESSMENT & PLAN NOTE
Patient is medically cleared for admission to the [facility] for treatment of the underlying psychiatric illness  SLIM will sign off   Please call with questions or concerns

## 2022-12-15 NOTE — H&P
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    Basophils Absolute 0 04 0 00 - 0 10 Thousands/µL   ECG 12 lead    Collection Time: 12/15/22 11:27 AM   Result Value Ref Range    Ventricular Rate 105 BPM    Atrial Rate 105 BPM    AK Interval 158 ms    QRSD Interval 72 ms    QT Interval 346 ms    QTC Interval 457 ms    P Axis 59 degrees    QRS Axis -1 degrees    T Wave Axis 71 degrees   ECG 12 lead    Collection Time: 12/15/22 11:28 AM   Result Value Ref Range    Ventricular Rate 106 BPM    Atrial Rate 106 BPM    AK Interval 156 ms    QRSD Interval 72 ms    QT Interval 350 ms    QTC Interval 464 ms    P Axis 56 degrees    QRS Axis -1 degrees    T Wave Axis 70 degrees   ECG 12 lead    Collection Time: 12/15/22  2:41 PM   Result Value Ref Range    Ventricular Rate 95 BPM    Atrial Rate 95 BPM    AK Interval 166 ms    QRSD Interval 74 ms    QT Interval 384 ms    QTC Interval 482 ms    P Axis 60 degrees    QRS Axis 0 degrees    T Wave Axis 55 degrees   ECG 12 lead    Collection Time: 12/15/22  2:42 PM   Result Value Ref Range    Ventricular Rate 101 BPM    Atrial Rate 101 BPM    AK Interval 164 ms    QRSD Interval 74 ms    QT Interval 374 ms    QTC Interval 484 ms    P Axis 58 degrees    QRS Axis 0 degrees    T Wave Axis 50 degrees Imaging Studies:     No orders to display        Code Status: Level 1 - Full Code  Advance Directive and Living Will: <no information>    Suicide/Homicide Risk Assessment:    Risk of Harm to Self:   The following ratings are based on assessment at the time of the interview  Nursing Suicide Risk Assessment Last 24 hours: C-SSRS Risk (Since Last Contact)  Calculated C-SSRS Risk Score (Since Last Contact): No Risk Indicated  Demographic risk factors include: , never , age: over 48 or older  Historical Risk Factors include: chronic psychiatric problems, chronic psychotic symptoms, history of suicide attempts, history of substance use, history of traumatic experiences  Current Specific Risk Factors include: has suicidal ideation with plan, mental illness diagnosis, current depressive symptoms, current anxiety symptoms, current psychotic symptoms, presence of hallucinations, hallucinations are command in nature, presence of paranoid ideation, hopelessness  Protective Factors: ability to communicate with staff on the unit, able to contract for safety on the unit, taking medications as ordered on the unit, compliant with medications, stable housing, connection to own children, having a desire to be alive, having a sense of purpose or meaning in life, no current substance use problems, restricted access to lethal means, safe and stable living environment, support at group home  Weapons/Firearms: none  The following steps have been taken to ensure weapons are properly secured: not applicable  Based on today's assessment, Corby Latisha presents the following risk of harm to self: low    Risk of Harm to Others: The following ratings are based on assessment at the time of the interview  Nursing Homicide Risk Assessment: Violence Risk to Others: Denies within past 6 months  Demographic Risk Factors include: none  Historical Risk Factors include: none    Current Specific Risk Factors include: current psychotic symptoms  Protective Factors: no current homicidal ideation, able to communicate with staff on the unit, compliant with medications, no current substance use problems, stable living environment, good support system, being a parent, restricted access to lethal means, safe and stable living environment, access to mental health treatment  Based on today's assessment, Ariella Lloyd presents the following risk of harm to others: low    The following interventions are recommended: behavioral checks every 7 minutes, continued hospitalization on locked unit     Assessment/Plan   Principal Problem:    Schizoaffective disorder, bipolar type ([address] )  Active Problems:    LYNN (generalized anxiety disorder)    Hyperlipidemia    Post-traumatic stress disorder, chronic    Mild intermittent asthma without complication    Gastroesophageal reflux disease    Vitamin D deficiency    Dependence on nicotine from cigarettes    Mild neurocognitive disorder    Medical clearance for psychiatric admission    Non-seasonal allergic rhinitis    Chronic midline low back pain without sciatica      _________________          Patient Strengths: cooperative, communication skills, motivation for treatment/growth, stable housing, support at group home     Patient Barriers: chronic mental illness, limited insight    -----------------------------------    Risks / Benefits of Treatment:     Risks, benefits, and possible side effects of medications explained to patient  The patient verbalizes understanding and agreement for treatment  Counseling / Coordination of Care:     Patient's presentation on admission and proposed treatment plan were discussed with the treatment team   Diagnosis, medication changes and treatment plan were reviewed with the patient  Recent stressors were discussed with the patient  Events leading to admission were reviewed with the patient  Importance of medication and treatment compliance was reviewed with the patient  Inpatient Psychiatric Certification:     Certification: Based upon physical, mental and social evaluations, I certify that inpatient psychiatric services are medically necessary for this patient for a duration of 14 midnights for the treatment of Schizoaffective disorder, bipolar type [facility])    Available alternative community resources do not meet the patient's mental health care needs  I further attest that an established written individualized plan of care has been implemented and is outlined in the patient's medical records  This note has been constructed using a voice recognition system  There may be translation, syntax, or grammatical errors  If you have any questions, please contact the dictating provider      Kellen Dahl MD  PGY-II Psychiatry Resident

## 2022-12-15 NOTE — TREATMENT PLAN
[facility]AT[facility] PLAN REVIEW [facility] 47 [facility]  1970 [facility] MRN: [id number]    [address] Room / Bed: [facility] [address] Encounter: [id number]        Admit Date/Time:  12/14/2022  4:11 PM    Treatment Team: Attending Provider: Malu Kaplan MD; Registered Nurse: Mariaa Zelaya RN; Care Manager: Trey Love, [facility] [place]; Registered Nurse: Denise Urias RN; Occupational Therapy Assistant: Farooq Rucker;  Resident: Leo Gurrola MD; : Albina Santos    Diagnosis: Principal Problem:    Schizoaffective disorder, bipolar type ([facility] [address] )  Active Problems:    LYNN (generalized anxiety disorder)    Hyperlipidemia    Post-traumatic stress disorder, chronic    Mild intermittent asthma without complication    Gastroesophageal reflux disease    Vitamin D deficiency    Dependence on nicotine from cigarettes    Mild neurocognitive disorder    Medical clearance for psychiatric admission    Non-seasonal allergic rhinitis    Chronic midline low back pain without sciatica      Patient Strengths/Assets: cooperative, communication skills, motivation for treatment/growth, stable housing, support at group home      Patient Barriers/Limitations: chronic mental illness, limited insight    Short Term Goals: decrease in depressive symptoms, decrease in anxiety symptoms, decrease in psychotic symptoms, ability to stay safe on the unit, improvement in self care, tolerate medications    Long Term Goals: improvement in depression, improvement in anxiety, resolution of depressive symptoms, free of suicidal thoughts, resolution of psychotic symptoms, improved reality testing, acceptance of need for psychiatric treatment, adequate self care    Progress Towards Goals: starting psychiatric medications as prescribed, restarting psychiatric medications as prescribed    Recommended Treatment: medication management, patient medication education, group therapy, milieu therapy, continued Behavioral Health psychiatric evaluation/assessment process     Treatment Frequency: daily medication monitoring, group and milieu therapy daily, monitoring through interdisciplinary rounds, monitoring through weekly patient care conferences    Expected Discharge Date: 14 days - 12/29/2022    Discharge Plan: referrals as indicated, return to previous living arrangement    Treatment Plan Created/Updated By: Tasia Bustos MD

## 2022-12-15 NOTE — PLAN OF CARE
Problem: Alteration in Thoughts and Perception  Goal: Treatment Goal: Gain control of psychotic behaviors/thinking, reduce/eliminate presenting symptoms and demonstrate improved reality functioning upon discharge  12/15/2022 0717 by Nat Terrazas RN  Outcome: Progressing  12/15/2022 0716 by Nat Terrazas RN  Outcome: Progressing  Goal: Verbalize thoughts and feelings  Description: Interventions:  - Promote a nonjudgmental and trusting relationship with the patient through active listening and therapeutic communication  - Assess patient's level of functioning, behavior and potential for risk  - Engage patient in 1 on 1 interactions  - Encourage patient to express fears, feelings, frustrations, and discuss symptoms    - [place] patient to reality, help patient recognize reality-based thinking   - Administer medications as ordered and assess for potential side effects  - Provide the patient education related to the signs and symptoms of the illness and desired effects of prescribed medications  12/15/2022 0717 by Nat Terrazas RN  Outcome: Progressing  12/15/2022 0716 by Nat Terrazas RN  Outcome: Progressing  Goal: Refrain from acting on delusional thinking/internal stimuli  Description: Interventions:  - Monitor patient closely, per order   - Utilize least restrictive measures   - Set reasonable limits, give positive feedback for acceptable   - Administer medications as ordered and monitor of potential side effects  12/15/2022 0717 by Nat Terrazas RN  Outcome: Progressing  12/15/2022 0716 by Nat Terrazas RN  Outcome: Progressing     Problem: Ineffective Coping  Goal: Cooperates with admission process  Description: Interventions:   - Complete admission process  12/15/2022 0717 by Nat Terrazas RN  Outcome: Progressing  12/15/2022 0716 by Nat Terrazas RN  Outcome: Progressing  Goal: Identifies ineffective coping skills  12/15/2022 0717 by Nat Terrazas RN  Outcome: Progressing  12/15/2022 0716 by Juan Pulliam RN  Outcome: Progressing  Goal: Identifies healthy coping skills  12/15/2022 0717 by Juan Pulliam RN  Outcome: Progressing  12/15/2022 0716 by Juan Pulliam RN  Outcome: Progressing  Goal: Demonstrates healthy coping skills  12/15/2022 0717 by Juan Pulliam RN  Outcome: Progressing  12/15/2022 0716 by Juan Pulliam RN  Outcome: Progressing     Problem: Depression  Goal: Treatment Goal: Demonstrate behavioral control of depressive symptoms, verbalize feelings of improved mood/affect, and adopt new coping skills prior to discharge  12/15/2022 0717 by Juan Pulliam RN  Outcome: Progressing  12/15/2022 0716 by Juan Pulliam RN  Outcome: Progressing  Goal: Refrain from harming self  Description: Interventions:  - Monitor patient closely, per order   - Supervise medication ingestion, monitor effects and side effects   12/15/2022 0717 by Juan Pulliam RN  Outcome: Progressing  12/15/2022 0716 by Juan Pulliam RN  Outcome: Progressing  Goal: Refrain from isolation  Description: Interventions:  - Develop a trusting relationship   - Encourage socialization   12/15/2022 0717 by Juan Pulliam RN  Outcome: Progressing  12/15/2022 0716 by Juan Pulliam RN  Outcome: Progressing  Goal: Refrain from self-neglect  12/15/2022 0717 by Juan Pulliam RN  Outcome: Progressing  12/15/2022 0716 by Juan Pulliam RN  Outcome: Progressing     Problem: Anxiety  Goal: Anxiety is at manageable level  Description: Interventions:  - Assess and monitor patient's anxiety level  - Monitor for signs and symptoms (heart palpitations, chest pain, shortness of breath, headaches, nausea, feeling jumpy, restlessness, irritable, apprehensive)  - Collaborate with interdisciplinary team and initiate plan and interventions as ordered    - [place] patient to unit/surroundings  - Explain treatment plan  - Encourage participation in care  - Encourage verbalization of concerns/fears  - Identify coping mechanisms  - Assist in developing anxiety-reducing skills  - Administer/offer alternative therapies  - Limit or eliminate stimulants  12/15/2022 0717 by Cm Palomino RN  Outcome: Progressing  12/15/2022 0716 by Cm Palomino RN  Outcome: Progressing     Problem: Alteration in Orientation  Goal: Treatment Goal: Demonstrate a reduction of confusion and improved orientation to person, place, time and/or situation upon discharge, according to optimum baseline/ability  12/15/2022 0717 by Cm Palomino RN  Outcome: Progressing  12/15/2022 0716 by Cm Palomino RN  Outcome: Progressing     Problem: Alteration in Thoughts and Perception  Goal: Treatment Goal: Gain control of psychotic behaviors/thinking, reduce/eliminate presenting symptoms and demonstrate improved reality functioning upon discharge  12/15/2022 0717 by Cm Palomino RN  Outcome: Progressing  12/15/2022 0716 by Cm Palomino RN  Outcome: Progressing  Goal: Verbalize thoughts and feelings  Description: Interventions:  - Promote a nonjudgmental and trusting relationship with the patient through active listening and therapeutic communication  - Assess patient's level of functioning, behavior and potential for risk  - Engage patient in 1 on 1 interactions  - Encourage patient to express fears, feelings, frustrations, and discuss symptoms    - [place] patient to reality, help patient recognize reality-based thinking   - Administer medications as ordered and assess for potential side effects  - Provide the patient education related to the signs and symptoms of the illness and desired effects of prescribed medications  12/15/2022 0717 by Cm Palomino RN  Outcome: Progressing  12/15/2022 0716 by Cm Palomino RN  Outcome: Progressing  Goal: Refrain from acting on delusional thinking/internal stimuli  Description: Interventions:  - Monitor patient closely, per order   - Utilize least restrictive measures   - Set reasonable limits, give positive feedback for acceptable   - Administer medications as ordered and monitor of potential side effects  12/15/2022 0717 by Kade Carrera RN  Outcome: Progressing  12/15/2022 0716 by Kade Carrera RN  Outcome: Progressing  Goal: Agree to be compliant with medication regime, as prescribed and report medication side effects  Description: Interventions:  - Offer appropriate PRN medication and supervise ingestion; conduct AIMS, as needed   12/15/2022 0717 by Kade Carrera RN  Outcome: Progressing  12/15/2022 0716 by Kade Carrera RN  Outcome: Progressing  Goal: Attend and participate in unit activities, including therapeutic, recreational, and educational groups  Description: Interventions:  -Encourage Visitation and family involvement in care  Outcome: Progressing     Problem: Ineffective Coping  Goal: Cooperates with admission process  Description: Interventions:   - Complete admission process  12/15/2022 0717 by Kade Carrera RN  Outcome: Progressing  12/15/2022 0716 by Kade Carrera RN  Outcome: Progressing  Goal: Understands least restrictive measures  Description: Interventions:  - Utilize least restrictive behavior  12/15/2022 0717 by Kade Carrera RN  Outcome: Progressing  12/15/2022 0716 by Kade Carrera RN  Outcome: Progressing  Goal: Free from restraint events  Description: - Utilize least restrictive measures   - Provide behavioral interventions   - Redirect inappropriate behaviors   12/15/2022 0717 by Kade Carrera RN  Outcome: Progressing  12/15/2022 0716 by Kade Carrera RN  Outcome: Progressing     Problem: Depression  Goal: Refrain from harming self  Description: Interventions:  - Monitor patient closely, per order   - Supervise medication ingestion, monitor effects and side effects   12/15/2022 0717 by Brigido Leo RN  Outcome: Progressing  12/15/2022 0716 by Brigido Leo RN  Outcome: Progressing  Goal: Refrain from isolation  Description: Interventions:  - Develop a trusting relationship   - Encourage socialization   12/15/2022 0717 by Brigido Leo RN  Outcome: Progressing  12/15/2022 0716 by Brigido Leo RN  Outcome: Progressing     Problem: Alteration in Orientation  Goal: Interact with staff daily  Description: Interventions:  - Assess and re-assess patient's level of orientation  - Engage patient in 1 on 1 interactions, daily, for a minimum of 15 minutes   - Establish rapport/trust with patient   12/15/2022 0717 by Brigido Leo RN  Outcome: Progressing  12/15/2022 0716 by Brigido Leo RN  Outcome: Progressing  Goal: Cooperate with recommended testing/procedures  Description: Interventions:  - Determine need for ancillary testing  - Observe for mental status changes  - Implement falls/precaution protocol   12/15/2022 0717 by Brigido Loe RN  Outcome: Progressing  12/15/2022 0716 by Brigido Leo RN  Outcome: Progressing

## 2022-12-15 NOTE — PROGRESS NOTES
12/15/22 1100   Activity/Group Checklist   Group   (recovery group)   Attendance Attended  (pulled by MD)   Attendance Duration (min) 16-30   Interactions Interacted appropriately   Affect/Mood Appropriate  (appears sad, depressed)   Goals Achieved Able to listen to others; Able to engage in interactions; Able to self-disclose

## 2022-12-15 NOTE — PROGRESS NOTES
12/15/22 0849   Team Meeting   Meeting Type Daily Rounds   Team Members Present   Team Members Present Physician;Nurse;; Other (Discipline and Name)   Physician Team Member Tyrell Charlton   Nursing Team Member Anitra Stuart   Social Work Team Member Yahaira   Other (Discipline and Name) [facility]   Patient/Family Present   Patient Present No   Patient's Family Present No     New admission, [address]  Transfer from [facility] due to increased depression, CAH, SI, VH  Lives in a group home, multiple inpatient admissions

## 2022-12-16 RX ORDER — CLOZAPINE 200 MG/1
200 TABLET ORAL
Status: DISCONTINUED | OUTPATIENT
Start: 2022-12-16 | End: 2022-12-19

## 2022-12-16 RX ORDER — CLOZAPINE 100 MG/1
100 TABLET ORAL
Status: DISCONTINUED | OUTPATIENT
Start: 2022-12-16 | End: 2023-01-10

## 2022-12-16 RX ORDER — CLOZAPINE 100 MG/1
100 TABLET ORAL DAILY
Status: DISCONTINUED | OUTPATIENT
Start: 2022-12-17 | End: 2023-01-11 | Stop reason: HOSPADM

## 2022-12-16 RX ORDER — LORAZEPAM 0.5 MG/1
0.5 TABLET ORAL DAILY
Status: COMPLETED | OUTPATIENT
Start: 2022-12-17 | End: 2022-12-18

## 2022-12-16 RX ADMIN — MAGNESIUM HYDROXIDE 30 ML: 400 SUSPENSION ORAL at 21:15

## 2022-12-16 RX ADMIN — HYDROXYZINE HYDROCHLORIDE 25 MG: 25 TABLET, FILM COATED ORAL at 21:14

## 2022-12-16 RX ADMIN — GLYCOPYRROLATE 1 MG: 1 TABLET ORAL at 08:53

## 2022-12-16 RX ADMIN — CLOZAPINE 75 MG: 25 TABLET ORAL at 08:53

## 2022-12-16 RX ADMIN — SENNOSIDES AND DOCUSATE SODIUM 1 TABLET: 50; 8.6 TABLET ORAL at 21:15

## 2022-12-16 RX ADMIN — LORATADINE 10 MG: 10 TABLET ORAL at 08:53

## 2022-12-16 RX ADMIN — OLANZAPINE 10 MG: 10 TABLET, FILM COATED ORAL at 10:45

## 2022-12-16 RX ADMIN — FLUTICASONE PROPIONATE 1 SPRAY: 50 SPRAY, METERED NASAL at 08:57

## 2022-12-16 RX ADMIN — PANTOPRAZOLE SODIUM 40 MG: 40 TABLET, DELAYED RELEASE ORAL at 08:53

## 2022-12-16 RX ADMIN — CLOZAPINE 100 MG: 100 TABLET ORAL at 14:18

## 2022-12-16 RX ADMIN — LORAZEPAM 0.5 MG: 0.5 TABLET ORAL at 08:52

## 2022-12-16 RX ADMIN — VENLAFAXINE HYDROCHLORIDE 150 MG: 150 CAPSULE, EXTENDED RELEASE ORAL at 08:53

## 2022-12-16 RX ADMIN — DOCUSATE SODIUM 100 MG: 100 CAPSULE, LIQUID FILLED ORAL at 18:04

## 2022-12-16 RX ADMIN — CLOZAPINE 200 MG: 200 TABLET ORAL at 21:15

## 2022-12-16 RX ADMIN — GLYCOPYRROLATE 1 MG: 1 TABLET ORAL at 18:04

## 2022-12-16 RX ADMIN — OLANZAPINE 5 MG: 5 TABLET, FILM COATED ORAL at 21:15

## 2022-12-16 RX ADMIN — NICOTINE 1 PATCH: 21 PATCH, EXTENDED RELEASE TRANSDERMAL at 08:55

## 2022-12-16 RX ADMIN — LIDOCAINE 1 PATCH: 50 PATCH CUTANEOUS at 08:54

## 2022-12-16 RX ADMIN — DOCUSATE SODIUM 100 MG: 100 CAPSULE, LIQUID FILLED ORAL at 08:53

## 2022-12-16 RX ADMIN — METHOCARBAMOL 500 MG: 500 TABLET, FILM COATED ORAL at 18:08

## 2022-12-16 RX ADMIN — CHOLECALCIFEROL TAB 25 MCG (1000 UNIT) 1000 UNITS: 25 TAB at 08:54

## 2022-12-16 RX ADMIN — FLUTICASONE PROPIONATE 1 SPRAY: 50 SPRAY, METERED NASAL at 18:03

## 2022-12-16 RX ADMIN — FENOFIBRATE 145 MG: 145 TABLET, COATED ORAL at 08:53

## 2022-12-16 RX ADMIN — TRAZODONE HYDROCHLORIDE 50 MG: 50 TABLET ORAL at 21:15

## 2022-12-16 RX ADMIN — ALBUTEROL SULFATE 2 PUFF: 90 AEROSOL, METERED RESPIRATORY (INHALATION) at 08:57

## 2022-12-16 NOTE — PROGRESS NOTES
12/16/22 1100   Activity/Group Checklist   Group   (recovery group)   Attendance Attended   Attendance Duration (min) 31-45   Interactions Interacted appropriately   Affect/Mood Appropriate   Goals Achieved Discussed coping strategies; Discussed self-esteem issues; Able to listen to others; Able to engage in interactions; Able to reflect/comment on own behavior;Able to self-disclose; Able to recieve feedback

## 2022-12-16 NOTE — PROGRESS NOTES
12/15/22 1400   Activity/Group Checklist   Group Other (Comment)  (Group Art Therapy/Psychodynamic, Open Choice with Discussion)   Attendance Attended   Attendance Duration (min) Greater than 60   Interactions Interacted appropriately   Affect/Mood Appropriate   Goals Achieved Discussed coping strategies; Able to listen to others; Able to engage in interactions; Able to recieve feedback; Able to give feedback to another;Able to experience relief/decrease in symptoms  (Able to engage materials; full participation with discussion)

## 2022-12-16 NOTE — PLAN OF CARE
Problem: Alteration in Thoughts and Perception  Goal: Treatment Goal: Gain control of psychotic behaviors/thinking, reduce/eliminate presenting symptoms and demonstrate improved reality functioning upon discharge  Outcome: Progressing  Goal: Verbalize thoughts and feelings  Description: Interventions:  - Promote a nonjudgmental and trusting relationship with the patient through active listening and therapeutic communication  - Assess patient's level of functioning, behavior and potential for risk  - Engage patient in 1 on 1 interactions  - Encourage patient to express fears, feelings, frustrations, and discuss symptoms    - [place] patient to reality, help patient recognize reality-based thinking   - Administer medications as ordered and assess for potential side effects  - Provide the patient education related to the signs and symptoms of the illness and desired effects of prescribed medications  Outcome: Progressing  Goal: Refrain from acting on delusional thinking/internal stimuli  Description: Interventions:  - Monitor patient closely, per order   - Utilize least restrictive measures   - Set reasonable limits, give positive feedback for acceptable   - Administer medications as ordered and monitor of potential side effects  Outcome: Progressing     Problem: Ineffective Coping  Goal: Cooperates with admission process  Description: Interventions:   - Complete admission process  Outcome: Progressing  Goal: Identifies ineffective coping skills  Outcome: Progressing  Goal: Identifies healthy coping skills  Outcome: Progressing  Goal: Demonstrates healthy coping skills  Outcome: Progressing     Problem: Depression  Goal: Treatment Goal: Demonstrate behavioral control of depressive symptoms, verbalize feelings of improved mood/affect, and adopt new coping skills prior to discharge  Outcome: Progressing  Goal: Refrain from harming self  Description: Interventions:  - Monitor patient closely, per order   - Supervise medication ingestion, monitor effects and side effects   Outcome: Progressing  Goal: Refrain from isolation  Description: Interventions:  - Develop a trusting relationship   - Encourage socialization   Outcome: Progressing  Goal: Refrain from self-neglect  Outcome: Progressing     Problem: DISCHARGE PLANNING  Goal: Discharge to home or other facility with appropriate resources  Description: INTERVENTIONS:  - Identify barriers to discharge w/patient and caregiver  - Arrange for needed discharge resources and transportation as appropriate  - Identify discharge learning needs (meds, wound care, etc )  - Arrange for interpretive services to assist at discharge as needed  - Refer to Case Management Department for coordinating discharge planning if the patient needs post-hospital services based on physician/advanced practitioner order or complex needs related to functional status, cognitive ability, or social support system  Outcome: Progressing

## 2022-12-16 NOTE — TREATMENT TEAM
Pt attended Mindfulness and 5 senses group  Pt cooperative, clam and pleasant  Pt was able to demonstrate mindfulness example, technique and engage in exercise  Pt social and supportive of peers  12/16/22 1300   Activity/Group Checklist   Group Other (Comment)  (Mindfulness and 5 senses)   Attendance Attended   Attendance Duration (min) 31-45   Interactions Interacted appropriately   Affect/Mood Appropriate;Calm   Goals Achieved Identified feelings; Discussed coping strategies; Able to listen to others; Able to engage in interactions; Able to reflect/comment on own behavior;Able to manage/cope with feelings;Verbalized increased hopefulness; Able to self-disclose; Able to recieve feedback

## 2022-12-16 NOTE — PROGRESS NOTES
Progress Note - Behavioral Health   Brenda Sandhu 47 y o  female MRN: [id number]  Unit/Bed#: [facility] 377-01 Encounter: [id number]    Assessment/Plan   Principal Problem:    Schizoaffective disorder, bipolar type ([address] )  Active Problems:    LYNN (generalized anxiety disorder)    Hyperlipidemia    Post-traumatic stress disorder, chronic    Mild intermittent asthma without complication    Gastroesophageal reflux disease    Vitamin D deficiency    Dependence on nicotine from cigarettes    Mild neurocognitive disorder    Medical clearance for psychiatric admission    Non-seasonal allergic rhinitis    Chronic midline low back pain without sciatica      Recommended Treatment:   • Will make the following medication changes:   o Change timing of clozaril to 2PM in afternoon  - Plan to increase AM clozaril tomorrow to 100mg, continue 100mg before dinner and 200mg HS for psychosis  - Tapering ativan for ECT initiation, 0 5mg daily for two days then discontinue  • Continue with current medications:  o Effexor 150mg daily for mood and anxiety  • Plan for ECT to begin next week  • Encourage Brenda Sandhu to participate in nonverbal forms of therapy including journaling and art/music therapy, and encourage participation in group, occupational, and milieu therapy  • Continue frequent safety checks and vitals per unit protocol  • Continue to collaborate with CM/SW to assist with collateral, disposition planning, and the implementation of an individualized, patient-centered plan of care  • Continue medical management by medical team as indicated  • Case discussed with treatment team   • Disposition: To be determined, coordinating with case management, date TBD    Legal Status: 201  ------------------------------------------------------------    Subjective: All documentation including nursing notes, medication history to ensure medication adherence on the unit, labs, and vitals were reviewed   Over the past 24 hours per nursing report, Hortencia Mccarty J has been cooperative on the unit and compliant with medications  She endorsed constipation last night and requested PRN medication for this, no BM was charted following this  Iva Poole was evaluated this morning for continuity of care and no acute distress was noted throughout the evaluation  She reports feeling "okay " Paulaagustin Rater notes having adequate appetite and sleep  She reports continued hypersalivation from clozaril but feels this is manageable with current robinul dose and does not want to increase this  She requests evening/afternoon dose of clozaril be moved earlier so she can avoid taking PRN medications  Today, Iva Poole is consenting for safety on the unit  vincent Rater denies current suicidal ideations, and denies homicidal ideations  Regarding hallucinations, Iva Rater endorses auditory hallucinations, however states she is able to block them out today by singing to herself  She endorses VH last seen yesterday  VS: Reviewed, within normal limits    Progress Toward Goals: unchanged - still endorsing AH, VH, denies SI    Psychiatric Review of Systems:  Behavior over the last 24 hours:  unchanged  Sleep: normal  Appetite: normal  Medication side effects: Yes - hypersalivation but is manageable and improved with robinul   ROS: denies any headache, shortness of breath or chest pain, all other systems are negative    Vital signs in last 24 hours:  Temp:  [97 4 °F (36 3 °C)-97 9 °F (36 6 °C)] 97 9 °F (36 6 °C)  HR:  [] 102  Resp:  [16] 16  BP: (111-135)/(73-88) 114/81    Laboratory results:  I have personally reviewed all pertinent laboratory/tests results    Recent Results (from the past 48 hour(s))   Comprehensive metabolic panel    Collection Time: 12/15/22  6:33 AM   Result Value Ref Range    Sodium 138 135 - 147 mmol/L    Potassium 4 5 3 5 - 5 3 mmol/L    Chloride 104 96 - 108 mmol/L    CO2 28 21 - 32 mmol/L    ANION GAP 6 5 - 14 mmol/L    BUN 16 5 - 25 mg/dL    Creatinine 0 70 0 60 - 1 20 mg/dL Glucose 114 (H) 70 - 99 mg/dL    Glucose, Fasting 114 (H) 70 - 99 mg/dL    Calcium 10 0 8 4 - 10 2 mg/dL    AST 31 14 - 36 U/L    ALT 30 <35 U/L    Alkaline Phosphatase 69 43 - 122 U/L    Total Protein 7 7 6 4 - 8 4 g/dL    Albumin 4 6 3 5 - 5 0 g/dL    Total Bilirubin 0 38 0 20 - 1 00 mg/dL    eGFR 100 ml/min/1 73sq m   CBC and differential    Collection Time: 12/15/22  6:33 AM   Result Value Ref Range    WBC 7 72 4 31 - 10 16 Thousand/uL    RBC 4 93 3 81 - 5 12 Million/uL    Hemoglobin 13 9 11 5 - 15 4 g/dL    Hematocrit 43 0 34 8 - 46 1 %    MCV 87 82 - 98 fL    MCH 28 2 26 8 - 34 3 pg    MCHC 32 3 31 4 - 37 4 g/dL    RDW 14 5 11 6 - 15 1 %    MPV 9 5 8 9 - 12 7 fL    Platelets 309 684 - 070 Thousands/uL    nRBC 0 /100 WBCs    Neutrophils Relative 63 43 - 75 %    Immat GRANS % 1 0 - 2 %    Lymphocytes Relative 23 14 - 44 %    Monocytes Relative 7 4 - 12 %    Eosinophils Relative 5 0 - 6 %    Basophils Relative 1 0 - 1 %    Neutrophils Absolute 4 92 1 85 - 7 62 Thousands/µL    Immature Grans Absolute 0 04 0 00 - 0 20 Thousand/uL    Lymphocytes Absolute 1 80 0 60 - 4 47 Thousands/µL    Monocytes Absolute 0 52 0 17 - 1 22 Thousand/µL    Eosinophils Absolute 0 40 0 00 - 0 61 Thousand/µL    Basophils Absolute 0 04 0 00 - 0 10 Thousands/µL   ECG 12 lead    Collection Time: 12/15/22 11:27 AM   Result Value Ref Range    Ventricular Rate 105 BPM    Atrial Rate 105 BPM    KS Interval 158 ms    QRSD Interval 72 ms    QT Interval 346 ms    QTC Interval 457 ms    P Axis 59 degrees    QRS Axis -1 degrees    T Wave Axis 71 degrees   ECG 12 lead    Collection Time: 12/15/22 11:28 AM   Result Value Ref Range    Ventricular Rate 106 BPM    Atrial Rate 106 BPM    KS Interval 156 ms    QRSD Interval 72 ms    QT Interval 350 ms    QTC Interval 464 ms    P Axis 56 degrees    QRS Axis -1 degrees    T Wave Axis 70 degrees   ECG 12 lead    Collection Time: 12/15/22  2:41 PM   Result Value Ref Range    Ventricular Rate 95 BPM    Atrial Rate 95 BPM    TX Interval 166 ms    QRSD Interval 74 ms    QT Interval 384 ms    QTC Interval 482 ms    P Axis 60 degrees    QRS Axis 0 degrees    T Wave Axis 55 degrees   ECG 12 lead    Collection Time: 12/15/22  2:42 PM   Result Value Ref Range    Ventricular Rate 101 BPM    Atrial Rate 101 BPM    TX Interval 164 ms    QRSD Interval 74 ms    QT Interval 374 ms    QTC Interval 484 ms    P Axis 58 degrees    QRS Axis 0 degrees    T Wave Axis 50 degrees         Mental Status Evaluation:    Appearance:  age appropriate, casually dressed, adequate grooming   Behavior:  cooperative, calm   Speech:  normal rate, normal pitch, soft   Mood:  "okay"   Affect:  blunted   Thought Process:  tangential   Associations: concrete associations   Thought Content:  paranoid ideation   Perceptual Disturbances: AH - voices, VH - pictures moving last seen yesterday   Risk Potential: Suicidal ideation - None at present  Homicidal ideation - None  Potential for aggression - No   Sensorium:  oriented to person, place and time/date   Memory:  recent and remote memory grossly intact   Consciousness:  alert and awake   Attention/Concentration: decreased concentration and decreased attention span   Insight:  impaired   Judgment: impaired   Gait/Station: normal gait/station, normal balance   Motor Activity: no abnormal movements       Current Medications:  Current Facility-Administered Medications   Medication Dose Route Frequency Provider Last Rate   • acetaminophen  650 mg Oral Q6H PRN Hernesto Ventura MD     • acetaminophen  650 mg Oral Q4H PRN Hernesto Ventura MD     • acetaminophen  975 mg Oral Q6H PRN Hernesto Ventura MD     • albuterol  2 puff Inhalation Q6H PRN Angela Newberry MD     • benztropine  1 mg Intramuscular Q4H PRN Max 6/day Hernesto Ventura MD     • benztropine  1 mg Oral Q4H PRN Max 6/day Hernesto Ventura MD     • cholecalciferol  1,000 Units Oral Daily Hernesto Ventura MD     • cloZAPine 100 mg Oral Before Marj Plunkett MD     • cloZAPine  200 mg Oral HS Varghese Rodriguez MD     • cloZAPine  75 mg Oral Daily Varghese Rodriguez MD     • hydrOXYzine HCL  50 mg Oral Q6H PRN Max 4/day Varghese Rodriguez MD      Or   • diphenhydrAMINE  50 mg Intramuscular Q6H PRN Varghese Rodriguez MD     • docusate sodium  100 mg Oral BID Varghese Rodriguez MD     • fenofibrate  145 mg Oral Daily Varghese Rodriguez MD     • fluticasone  1 spray Each Nare BID Varghese Rodriguez MD     • glycerin-hypromellose-  1 drop Both Eyes Q3H PRN Varghese Rodriguez MD     • glycopyrrolate  1 mg Oral BID Gem Ruano MD     • hydrocortisone   Topical 4x Daily PRN Merle Monson PA-C     • hydrocortisone   Topical 4x Daily PRN Merle Monson PA-C     • hydrOXYzine HCL  100 mg Oral Q6H PRN Max 4/day Varghese Rodriguez MD      Or   • LORazepam  2 mg Intramuscular Q6H PRN Varghese Rodriguez MD     • hydrOXYzine HCL  25 mg Oral Q6H PRN Max 4/day Varghese Rodriguez MD     • lidocaine  1 patch Topical Daily Varghese Rodriguez MD     • loratadine  10 mg Oral Daily Varghese Rodriguez MD     • LORazepam  0 5 mg Oral Daily Varghese Rodriguez MD     • LORazepam  0 5 mg Oral HS Gem Ruano MD     • magnesium hydroxide  30 mL Oral Daily PRN Merle Monson PA-C     • methocarbamol  500 mg Oral Q6H PRN Varghese Rodriguez MD     • nicotine  1 patch Transdermal Daily Varghese Rodriguez MD     • nicotine polacrilex  4 mg Oral Q2H PRN Varghese Rodriguez MD     • OLANZapine  10 mg Oral Q3H PRN Max 3/day Varghese Rodriguez MD      Or   • OLANZapine  10 mg Intramuscular Q3H PRN Max 3/day Varghese Rodriguez MD     • OLANZapine  5 mg Oral Q3H PRN Max 6/day Varghese Rodriguez MD      Or   • OLANZapine  5 mg Intramuscular Q3H PRN Max 6/day Varghese Rodriguez MD     • OLANZapine  2 5 mg Oral Q3H PRN Max 8/day Varghese Rodriguez MD     • pantoprazole 40 mg Oral Early Morning May MD Randi     • propranolol  10 mg Oral Q8H PRN May MD Randi     • senna-docusate sodium  1 tablet Oral HS May MD Randi     • senna-docusate sodium  1 tablet Oral Daily PRN May MD Randi     • sorbitol  30 mL Oral Q1H PRN May MD Randi     • traZODone  50 mg Oral HS PRN May MD Randi     • venlafaxine  150 mg Oral Daily May MD Randi         Suicide/Homicide Risk Assessment:  Risk of Harm to Self:   Nursing Suicide Risk Assessment Last 24 hours: C-SSRS Risk (Since Last Contact)  Calculated C-SSRS Risk Score (Since Last Contact): No Risk Indicated  Current Specific Risk Factors include: mental illness diagnosis, current psychotic symptoms, presence of hallucinations  Protective Factors: no current suicidal ideation, ability to communicate with staff on the unit, taking medications as ordered on the unit  Based on today's assessment, Estevan Santiago presents the following risk of harm to self: low    Risk of Harm to Others:  Nursing Homicide Risk Assessment: Violence Risk to Others: Denies within past 6 months  Based on today's assessment, Estevan Santiago presents the following risk of harm to others: low    The following interventions are recommended: behavioral checks every 7 minutes, continued hospitalization on locked unit    Behavioral Health Medications: All current active meds have been reviewed  Changes as in plan section above  Risks, benefits and possible side effects of Medications:   No new medications    Counseling / Coordination of Care:  Patient's progress discussed with staff in treatment team meeting  Medications, treatment progress and treatment plan reviewed with patient  Jammie Bryan MD 12/16/22  Psychiatry Resident, PGY-II    This note was completed in part utilizing [employer] Software   Grammatical, translation, syntax errors, random word insertions, spelling mistakes, and incomplete sentences may be an occasional consequence of this system secondary to software limitations with voice recognition, ambient noise, and hardware issues  If you have any questions or concerns about the content, text, or information contained within the body of this dictation, please contact the provider for clarification
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
## 2022-12-16 NOTE — QUICK NOTE
Provided second opinion for ECT  Consent form signed  Questions answered regarding the treatment  Risks and benefits were explained and she expressed understanding of this  She reports she has a history of ECT, tolerated well  Does report she experienced headaches in the past and some short-term memory deficits which ultimately improved  We discussed use of RUL to mitigate such side effects

## 2022-12-17 RX ADMIN — SENNOSIDES AND DOCUSATE SODIUM 1 TABLET: 50; 8.6 TABLET ORAL at 21:06

## 2022-12-17 RX ADMIN — NICOTINE 1 PATCH: 21 PATCH, EXTENDED RELEASE TRANSDERMAL at 09:00

## 2022-12-17 RX ADMIN — CHOLECALCIFEROL TAB 25 MCG (1000 UNIT) 1000 UNITS: 25 TAB at 08:58

## 2022-12-17 RX ADMIN — DOCUSATE SODIUM 100 MG: 100 CAPSULE, LIQUID FILLED ORAL at 17:14

## 2022-12-17 RX ADMIN — METHOCARBAMOL 500 MG: 500 TABLET, FILM COATED ORAL at 10:31

## 2022-12-17 RX ADMIN — CLOZAPINE 100 MG: 100 TABLET ORAL at 14:48

## 2022-12-17 RX ADMIN — VENLAFAXINE HYDROCHLORIDE 150 MG: 150 CAPSULE, EXTENDED RELEASE ORAL at 08:58

## 2022-12-17 RX ADMIN — GLYCOPYRROLATE 1 MG: 1 TABLET ORAL at 08:58

## 2022-12-17 RX ADMIN — FENOFIBRATE 145 MG: 145 TABLET, COATED ORAL at 08:58

## 2022-12-17 RX ADMIN — MAGNESIUM HYDROXIDE 30 ML: 400 SUSPENSION ORAL at 16:55

## 2022-12-17 RX ADMIN — CLOZAPINE 100 MG: 100 TABLET ORAL at 08:59

## 2022-12-17 RX ADMIN — CLOZAPINE 200 MG: 200 TABLET ORAL at 21:06

## 2022-12-17 RX ADMIN — TRAZODONE HYDROCHLORIDE 50 MG: 50 TABLET ORAL at 22:06

## 2022-12-17 RX ADMIN — DOCUSATE SODIUM 100 MG: 100 CAPSULE, LIQUID FILLED ORAL at 08:58

## 2022-12-17 RX ADMIN — GLYCOPYRROLATE 1 MG: 1 TABLET ORAL at 17:14

## 2022-12-17 RX ADMIN — LORAZEPAM 0.5 MG: 0.5 TABLET ORAL at 08:58

## 2022-12-17 RX ADMIN — OLANZAPINE 10 MG: 10 TABLET, FILM COATED ORAL at 10:31

## 2022-12-17 RX ADMIN — FLUTICASONE PROPIONATE 1 SPRAY: 50 SPRAY, METERED NASAL at 17:14

## 2022-12-17 RX ADMIN — LIDOCAINE 1 PATCH: 50 PATCH CUTANEOUS at 08:59

## 2022-12-17 RX ADMIN — LORATADINE 10 MG: 10 TABLET ORAL at 08:58

## 2022-12-17 RX ADMIN — OLANZAPINE 10 MG: 10 TABLET, FILM COATED ORAL at 19:34

## 2022-12-17 RX ADMIN — PANTOPRAZOLE SODIUM 40 MG: 40 TABLET, DELAYED RELEASE ORAL at 08:58

## 2022-12-17 RX ADMIN — HYDROXYZINE HYDROCHLORIDE 25 MG: 25 TABLET, FILM COATED ORAL at 21:06

## 2022-12-17 NOTE — PROGRESS NOTES
12/16/22 1400   Activity/Group Checklist   Group Other (Comment)  (Group Art Therapy/Psychodynamic, Creatively Exploring Deconstruction and Transformation for Recovery)   Attendance Attended   Attendance Duration (min) Greater than 60   Interactions Interacted appropriately   Affect/Mood Appropriate   Goals Achieved Discussed coping strategies; Able to listen to others; Able to engage in interactions; Able to recieve feedback; Able to give feedback to another  (Able to engage materials and directive; full participation with discussion   Able to gain insight regarding her resistance to recovery )

## 2022-12-17 NOTE — PROGRESS NOTES
12/17/22 0900   Activity/Group Checklist   Group Other (Comment)  (OPEN STUDIO Art Therapy/Social Group, Free-Expression)   Attendance Attended   Attendance Duration (min) 46-60   Interactions Interacted appropriately   Affect/Mood Appropriate   Goals Achieved Able to listen to others; Able to engage in interactions

## 2022-12-17 NOTE — PLAN OF CARE
Problem: Alteration in Thoughts and Perception  Goal: Treatment Goal: Gain control of psychotic behaviors/thinking, reduce/eliminate presenting symptoms and demonstrate improved reality functioning upon discharge  Outcome: Progressing  Goal: Verbalize thoughts and feelings  Description: Interventions:  - Promote a nonjudgmental and trusting relationship with the patient through active listening and therapeutic communication  - Assess patient's level of functioning, behavior and potential for risk  - Engage patient in 1 on 1 interactions  - Encourage patient to express fears, feelings, frustrations, and discuss symptoms    - [place] patient to reality, help patient recognize reality-based thinking   - Administer medications as ordered and assess for potential side effects  - Provide the patient education related to the signs and symptoms of the illness and desired effects of prescribed medications  Outcome: Progressing  Goal: Refrain from acting on delusional thinking/internal stimuli  Description: Interventions:  - Monitor patient closely, per order   - Utilize least restrictive measures   - Set reasonable limits, give positive feedback for acceptable   - Administer medications as ordered and monitor of potential side effects  Outcome: Progressing     Problem: Ineffective Coping  Goal: Cooperates with admission process  Description: Interventions:   - Complete admission process  Outcome: Progressing  Goal: Identifies ineffective coping skills  Outcome: Progressing  Goal: Identifies healthy coping skills  Outcome: Progressing  Goal: Demonstrates healthy coping skills  Outcome: Progressing     Problem: Depression  Goal: Treatment Goal: Demonstrate behavioral control of depressive symptoms, verbalize feelings of improved mood/affect, and adopt new coping skills prior to discharge  Outcome: Progressing  Goal: Refrain from harming self  Description: Interventions:  - Monitor patient closely, per order   - Supervise medication ingestion, monitor effects and side effects   Outcome: Progressing  Goal: Refrain from isolation  Description: Interventions:  - Develop a trusting relationship   - Encourage socialization   Outcome: Progressing  Goal: Refrain from self-neglect  Outcome: Progressing     Problem: DISCHARGE PLANNING  Goal: Discharge to home or other facility with appropriate resources  Description: INTERVENTIONS:  - Identify barriers to discharge w/patient and caregiver  - Arrange for needed discharge resources and transportation as appropriate  - Identify discharge learning needs (meds, wound care, etc )  - Arrange for interpretive services to assist at discharge as needed  - Refer to Case Management Department for coordinating discharge planning if the patient needs post-hospital services based on physician/advanced practitioner order or complex needs related to functional status, cognitive ability, or social support system  Outcome: Progressing
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
## 2022-12-18 ENCOUNTER — ANESTHESIA EVENT (OUTPATIENT)
Dept: ANESTHESIOLOGY | Facility: HOSPITAL | Age: 52
End: 2022-12-18

## 2022-12-18 ENCOUNTER — ANESTHESIA (OUTPATIENT)
Dept: ANESTHESIOLOGY | Facility: HOSPITAL | Age: 52
End: 2022-12-18

## 2022-12-18 RX ORDER — CALCIUM CARBONATE 200(500)MG
500 TABLET,CHEWABLE ORAL DAILY PRN
Status: DISCONTINUED | OUTPATIENT
Start: 2022-12-18 | End: 2023-01-11 | Stop reason: HOSPADM

## 2022-12-18 RX ORDER — SODIUM CHLORIDE, SODIUM LACTATE, POTASSIUM CHLORIDE, CALCIUM CHLORIDE 600; 310; 30; 20 MG/100ML; MG/100ML; MG/100ML; MG/100ML
50 INJECTION, SOLUTION INTRAVENOUS CONTINUOUS
Status: CANCELLED | OUTPATIENT
Start: 2022-12-18

## 2022-12-18 RX ADMIN — NICOTINE 1 PATCH: 21 PATCH, EXTENDED RELEASE TRANSDERMAL at 08:37

## 2022-12-18 RX ADMIN — LORAZEPAM 0.5 MG: 0.5 TABLET ORAL at 08:35

## 2022-12-18 RX ADMIN — FENOFIBRATE 145 MG: 145 TABLET, COATED ORAL at 08:35

## 2022-12-18 RX ADMIN — OLANZAPINE 10 MG: 10 TABLET, FILM COATED ORAL at 15:56

## 2022-12-18 RX ADMIN — CHOLECALCIFEROL TAB 25 MCG (1000 UNIT) 1000 UNITS: 25 TAB at 08:35

## 2022-12-18 RX ADMIN — CLOZAPINE 100 MG: 100 TABLET ORAL at 14:19

## 2022-12-18 RX ADMIN — OLANZAPINE 10 MG: 10 TABLET, FILM COATED ORAL at 10:40

## 2022-12-18 RX ADMIN — PANTOPRAZOLE SODIUM 40 MG: 40 TABLET, DELAYED RELEASE ORAL at 08:36

## 2022-12-18 RX ADMIN — TRAZODONE HYDROCHLORIDE 50 MG: 50 TABLET ORAL at 21:51

## 2022-12-18 RX ADMIN — CALCIUM CARBONATE (ANTACID) CHEW TAB 500 MG 500 MG: 500 CHEW TAB at 15:53

## 2022-12-18 RX ADMIN — DOCUSATE SODIUM 100 MG: 100 CAPSULE, LIQUID FILLED ORAL at 08:35

## 2022-12-18 RX ADMIN — LIDOCAINE 1 PATCH: 50 PATCH CUTANEOUS at 08:36

## 2022-12-18 RX ADMIN — CLOZAPINE 100 MG: 100 TABLET ORAL at 08:35

## 2022-12-18 RX ADMIN — DOCUSATE SODIUM 100 MG: 100 CAPSULE, LIQUID FILLED ORAL at 17:45

## 2022-12-18 RX ADMIN — LORATADINE 10 MG: 10 TABLET ORAL at 08:35

## 2022-12-18 RX ADMIN — FLUTICASONE PROPIONATE 1 SPRAY: 50 SPRAY, METERED NASAL at 08:36

## 2022-12-18 RX ADMIN — CLOZAPINE 200 MG: 200 TABLET ORAL at 21:50

## 2022-12-18 RX ADMIN — GLYCOPYRROLATE 1 MG: 1 TABLET ORAL at 17:45

## 2022-12-18 RX ADMIN — SENNOSIDES AND DOCUSATE SODIUM 1 TABLET: 50; 8.6 TABLET ORAL at 21:52

## 2022-12-18 RX ADMIN — GLYCOPYRROLATE 1 MG: 1 TABLET ORAL at 08:35

## 2022-12-18 RX ADMIN — HYDROXYZINE HYDROCHLORIDE 100 MG: 50 TABLET ORAL at 15:53

## 2022-12-18 RX ADMIN — HYDROXYZINE HYDROCHLORIDE 25 MG: 25 TABLET, FILM COATED ORAL at 21:50

## 2022-12-18 RX ADMIN — VENLAFAXINE HYDROCHLORIDE 150 MG: 150 CAPSULE, EXTENDED RELEASE ORAL at 08:35

## 2022-12-18 RX ADMIN — ALBUTEROL SULFATE 2 PUFF: 90 AEROSOL, METERED RESPIRATORY (INHALATION) at 08:36
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
## 2022-12-18 NOTE — PLAN OF CARE
Problem: Alteration in Thoughts and Perception  Goal: Treatment Goal: Gain control of psychotic behaviors/thinking, reduce/eliminate presenting symptoms and demonstrate improved reality functioning upon discharge  Outcome: Progressing  Goal: Verbalize thoughts and feelings  Description: Interventions:  - Promote a nonjudgmental and trusting relationship with the patient through active listening and therapeutic communication  - Assess patient's level of functioning, behavior and potential for risk  - Engage patient in 1 on 1 interactions  - Encourage patient to express fears, feelings, frustrations, and discuss symptoms    - [place] patient to reality, help patient recognize reality-based thinking   - Administer medications as ordered and assess for potential side effects  - Provide the patient education related to the signs and symptoms of the illness and desired effects of prescribed medications  Outcome: Progressing  Goal: Refrain from acting on delusional thinking/internal stimuli  Description: Interventions:  - Monitor patient closely, per order   - Utilize least restrictive measures   - Set reasonable limits, give positive feedback for acceptable   - Administer medications as ordered and monitor of potential side effects  Outcome: Progressing     Problem: Ineffective Coping  Goal: Cooperates with admission process  Description: Interventions:   - Complete admission process  Outcome: Progressing  Goal: Identifies ineffective coping skills  Outcome: Progressing  Goal: Identifies healthy coping skills  Outcome: Progressing  Goal: Demonstrates healthy coping skills  Outcome: Progressing     Problem: Depression  Goal: Treatment Goal: Demonstrate behavioral control of depressive symptoms, verbalize feelings of improved mood/affect, and adopt new coping skills prior to discharge  Outcome: Progressing  Goal: Refrain from harming self  Description: Interventions:  - Monitor patient closely, per order   - Supervise medication ingestion, monitor effects and side effects   Outcome: Progressing  Goal: Refrain from isolation  Description: Interventions:  - Develop a trusting relationship   - Encourage socialization   Outcome: Progressing  Goal: Refrain from self-neglect  Outcome: Progressing     Problem: DISCHARGE PLANNING  Goal: Discharge to home or other facility with appropriate resources  Description: INTERVENTIONS:  - Identify barriers to discharge w/patient and caregiver  - Arrange for needed discharge resources and transportation as appropriate  - Identify discharge learning needs (meds, wound care, etc )  - Arrange for interpretive services to assist at discharge as needed  - Refer to Case Management Department for coordinating discharge planning if the patient needs post-hospital services based on physician/advanced practitioner order or complex needs related to functional status, cognitive ability, or social support system  Outcome: Progressing

## 2022-12-19 ENCOUNTER — ANESTHESIA EVENT (INPATIENT)
Dept: PREOP/PACU | Facility: HOSPITAL | Age: 52
End: 2022-12-19

## 2022-12-19 ENCOUNTER — APPOINTMENT (INPATIENT)
Dept: PREOP/PACU | Facility: HOSPITAL | Age: 52
End: 2022-12-19
Attending: PSYCHIATRY & NEUROLOGY

## 2022-12-19 ENCOUNTER — ANESTHESIA (INPATIENT)
Dept: PREOP/PACU | Facility: HOSPITAL | Age: 52
End: 2022-12-19

## 2022-12-19 PROCEDURE — GZB4ZZZ OTHER ELECTROCONVULSIVE THERAPY: ICD-10-PCS | Performed by: PSYCHIATRY & NEUROLOGY

## 2022-12-19 RX ORDER — GLYCOPYRROLATE 0.2 MG/ML
INJECTION INTRAMUSCULAR; INTRAVENOUS AS NEEDED
Status: DISCONTINUED | OUTPATIENT
Start: 2022-12-19 | End: 2022-12-19

## 2022-12-19 RX ORDER — BUTALBITAL, ACETAMINOPHEN AND CAFFEINE 50; 325; 40 MG/1; MG/1; MG/1
1 TABLET ORAL ONCE
Status: COMPLETED | OUTPATIENT
Start: 2022-12-19 | End: 2022-12-19

## 2022-12-19 RX ORDER — KETOROLAC TROMETHAMINE 30 MG/ML
INJECTION, SOLUTION INTRAMUSCULAR; INTRAVENOUS AS NEEDED
Status: DISCONTINUED | OUTPATIENT
Start: 2022-12-19 | End: 2022-12-19

## 2022-12-19 RX ORDER — SUCCINYLCHOLINE/SOD CL,ISO/PF 100 MG/5ML
SYRINGE (ML) INTRAVENOUS AS NEEDED
Status: DISCONTINUED | OUTPATIENT
Start: 2022-12-19 | End: 2022-12-19

## 2022-12-19 RX ORDER — SODIUM CHLORIDE 9 MG/ML
INJECTION, SOLUTION INTRAVENOUS CONTINUOUS PRN
Status: DISCONTINUED | OUTPATIENT
Start: 2022-12-19 | End: 2022-12-19

## 2022-12-19 RX ORDER — SODIUM CHLORIDE, SODIUM LACTATE, POTASSIUM CHLORIDE, CALCIUM CHLORIDE 600; 310; 30; 20 MG/100ML; MG/100ML; MG/100ML; MG/100ML
50 INJECTION, SOLUTION INTRAVENOUS CONTINUOUS
Status: DISCONTINUED | OUTPATIENT
Start: 2022-12-19 | End: 2022-12-19

## 2022-12-19 RX ORDER — ESMOLOL HYDROCHLORIDE 10 MG/ML
INJECTION INTRAVENOUS AS NEEDED
Status: DISCONTINUED | OUTPATIENT
Start: 2022-12-19 | End: 2022-12-19

## 2022-12-19 RX ORDER — SODIUM CHLORIDE 9 MG/ML
50 INJECTION, SOLUTION INTRAVENOUS CONTINUOUS
Status: DISCONTINUED | OUTPATIENT
Start: 2022-12-19 | End: 2022-12-19

## 2022-12-19 RX ADMIN — ACETAMINOPHEN 650 MG: 325 TABLET ORAL at 07:29

## 2022-12-19 RX ADMIN — SODIUM CHLORIDE: 0.9 INJECTION, SOLUTION INTRAVENOUS at 06:05

## 2022-12-19 RX ADMIN — GLYCOPYRROLATE 0.2 MG: 0.2 INJECTION, SOLUTION INTRAMUSCULAR; INTRAVENOUS at 06:33

## 2022-12-19 RX ADMIN — ALBUTEROL SULFATE 2 PUFF: 90 AEROSOL, METERED RESPIRATORY (INHALATION) at 17:32

## 2022-12-19 RX ADMIN — METHOHEXITAL SODIUM 100 MG: 500 INJECTION, POWDER, LYOPHILIZED, FOR SOLUTION INTRAMUSCULAR; INTRAVENOUS; RECTAL at 06:39

## 2022-12-19 RX ADMIN — BUTALBITAL, ACETAMINOPHEN, AND CAFFEINE 1 TABLET: 50; 325; 40 TABLET ORAL at 20:09

## 2022-12-19 RX ADMIN — KETOROLAC TROMETHAMINE 30 MG: 30 INJECTION, SOLUTION INTRAMUSCULAR; INTRAVENOUS at 06:38

## 2022-12-19 RX ADMIN — CLOZAPINE 225 MG: 25 TABLET ORAL at 20:19

## 2022-12-19 RX ADMIN — DOCUSATE SODIUM 100 MG: 100 CAPSULE, LIQUID FILLED ORAL at 17:30

## 2022-12-19 RX ADMIN — Medication 100 MG: at 06:39

## 2022-12-19 RX ADMIN — ESMOLOL HYDROCHLORIDE 50 MG: 10 INJECTION, SOLUTION INTRAVENOUS at 06:47

## 2022-12-19 RX ADMIN — LORATADINE 10 MG: 10 TABLET ORAL at 08:12

## 2022-12-19 RX ADMIN — CHOLECALCIFEROL TAB 25 MCG (1000 UNIT) 1000 UNITS: 25 TAB at 08:12

## 2022-12-19 RX ADMIN — METHOCARBAMOL 500 MG: 500 TABLET, FILM COATED ORAL at 11:01

## 2022-12-19 RX ADMIN — NICOTINE 1 PATCH: 21 PATCH, EXTENDED RELEASE TRANSDERMAL at 08:12

## 2022-12-19 RX ADMIN — LIDOCAINE 1 PATCH: 50 PATCH CUTANEOUS at 08:12

## 2022-12-19 RX ADMIN — GLYCOPYRROLATE 1 MG: 1 TABLET ORAL at 08:12

## 2022-12-19 RX ADMIN — OLANZAPINE 10 MG: 10 TABLET, FILM COATED ORAL at 16:01

## 2022-12-19 RX ADMIN — GLYCOPYRROLATE 1 MG: 1 TABLET ORAL at 17:30

## 2022-12-19 RX ADMIN — DOCUSATE SODIUM 100 MG: 100 CAPSULE, LIQUID FILLED ORAL at 08:12

## 2022-12-19 RX ADMIN — ACETAMINOPHEN 650 MG: 325 TABLET ORAL at 11:01

## 2022-12-19 RX ADMIN — ACETAMINOPHEN 975 MG: 325 TABLET ORAL at 16:01

## 2022-12-19 RX ADMIN — SENNOSIDES AND DOCUSATE SODIUM 1 TABLET: 50; 8.6 TABLET ORAL at 21:06

## 2022-12-19 RX ADMIN — FLUTICASONE PROPIONATE 1 SPRAY: 50 SPRAY, METERED NASAL at 17:30

## 2022-12-19 RX ADMIN — FLUTICASONE PROPIONATE 1 SPRAY: 50 SPRAY, METERED NASAL at 08:12

## 2022-12-19 RX ADMIN — VENLAFAXINE HYDROCHLORIDE 150 MG: 150 CAPSULE, EXTENDED RELEASE ORAL at 08:12

## 2022-12-19 RX ADMIN — PANTOPRAZOLE SODIUM 40 MG: 40 TABLET, DELAYED RELEASE ORAL at 07:30

## 2022-12-19 RX ADMIN — CLOZAPINE 100 MG: 100 TABLET ORAL at 14:25

## 2022-12-19 RX ADMIN — FENOFIBRATE 145 MG: 145 TABLET, COATED ORAL at 08:12

## 2022-12-19 RX ADMIN — CLOZAPINE 100 MG: 100 TABLET ORAL at 08:12

## 2022-12-19 NOTE — ANESTHESIA PREPROCEDURE EVALUATION
Procedure:  ECT INPATIENT    Relevant Problems   CARDIO   (+) Hyperlipidemia      GI/HEPATIC   (+) Gastroesophageal reflux disease      MUSCULOSKELETAL   (+) Chronic midline low back pain without sciatica   (+) Degenerative disc disease, lumbar      NEURO/PSYCH   (+) Chronic midline low back pain without sciatica   (+) LYNN (generalized anxiety disorder)   (+) History of alcohol dependence (HCC)   (+) Other headache syndrome   (+) Post-traumatic stress disorder, chronic      PULMONARY   (+) Emphysema lung (HCC)   (+) Mild intermittent asthma without complication        Physical Exam    Airway    Mallampati score: II  TM Distance: >3 FB  Neck ROM: full     Dental       Cardiovascular  Cardiovascular exam normal    Pulmonary  Pulmonary exam normal     Other Findings        Anesthesia Plan  ASA Score- 3     Anesthesia Type- general with ASA Monitors  Additional Monitors:   Airway Plan:           Plan Factors-Exercise tolerance (METS): >4 METS  Chart reviewed  EKG reviewed  Existing labs reviewed  Patient summary reviewed  Patient is a current smoker  Patient instructed to abstain from smoking on day of procedure  Patient did not smoke on day of surgery  Induction- intravenous  Postoperative Plan-     Informed Consent- Anesthetic plan and risks discussed with patient  I personally reviewed this patient with the CRNA  Discussed and agreed on the Anesthesia Plan with the CRNA               Lab Results   Component Value Date    HGBA1C 5 6 12/02/2022       Lab Results   Component Value Date     06/22/2018    K 4 5 12/15/2022     12/15/2022    CO2 28 12/15/2022    ANIONGAP 14 4 06/22/2018    BUN 16 12/15/2022    CREATININE 0 70 12/15/2022    GLUCOSE 74 08/20/2015    GLUF 114 (H) 12/15/2022    CALCIUM 10 0 12/15/2022    AST 31 12/15/2022    ALT 30 12/15/2022    ALKPHOS 69 12/15/2022    PROT 6 8 06/22/2018    BILITOT 0 2 (L) 06/22/2018    EGFR 100 12/15/2022       Lab Results   Component Value Date    WBC 7 72 12/15/2022    HGB 13 9 12/15/2022    HCT 43 0 12/15/2022    MCV 87 12/15/2022     12/15/2022    Sinus tachycardia  Low voltage QRS  Cannot rule out Inferior infarct (cited on or before 15-DEC-2022)  Abnormal ECG  When compared with ECG of 15-DEC-2022 14:41, (unconfirmed)  No significant change was found  Confirmed by Russ Johns ([id number]) on 12/15/2022 2:06:20 PM    PCP clearance reviewed

## 2022-12-19 NOTE — PROGRESS NOTES
Progress Note - Behavioral Health   Colonel Suarez 47 y o  female MRN: [id number]  Unit/Bed#: [facility] 377-01 Encounter: [id number]    Assessment/Plan   Principal Problem:    Schizoaffective disorder, bipolar type ([address] )  Active Problems:    LYNN (generalized anxiety disorder)    Hyperlipidemia    Post-traumatic stress disorder, chronic    Mild intermittent asthma without complication    Gastroesophageal reflux disease    Vitamin D deficiency    Dependence on nicotine from cigarettes    Mild neurocognitive disorder    Medical clearance for psychiatric admission    Non-seasonal allergic rhinitis    Chronic midline low back pain without sciatica      Recommended Treatment:   No psychopharmacologic changes necessary at this moment with the exception of increasing night time clozaril to 225mg HS for psychotic symptoms and suicidality; will continue to assess daily for further optimization  Continue with pharmacotherapy, group therapy, milieu therapy and occupational therapy  Continue to assess for adverse medication side effects  Encourage Jarett Lamb to participate in nonverbal forms of therapy including journaling and art/music therapy  Continue frequent safety checks and vitals per unit protocol  Continue to engage CM/SW to assist with collateral, disposition planning, and the implementation of an individualized, patient-centered plan of care  Continue medical management by medical team   Case discussed with treatment team     Legal Status: 201  ------------------------------------------------------------    Subjective: All documentation including nursing notes, medication history to ensure medication adherence on the unit, labs, and vitals were reviewed  Jarett Atkinson was evaluated this morning for continuity of care and no acute distress noted throughout the evaluation  Over the past 24 hours per nursing report, Jarett Atkinson has been cooperative on the unit and compliant with medications        Today, Jarett Atkinson is consenting for safety on the unit  Alberto Villela reports feeling "hopeful " Alberto Villela notes having good sleep  Alberto Villela states having a good appetite  Alberto Villela has been  taking the medications as prescribed and reporting no side effects  But her memory issues this AM have resolved  Patient states that the last time she had AVH was yesterday, and she has a hard time telling if they are thoughts or hallucinations  Additionally she states that she is having a migraine after ECT today  Alberto Villela denies suicidal ideations  Alberto Villela denies homicidal ideations  Regarding hallucinations, Alberto Villela denies and does not appear internally stimulated  PRNs overnight: none   VS: Reviewed, obese, otherwise within normal limits    Progress Toward Goals: slow improvement    Psychiatric Review of Systems:  Behavior over the last 24 hours:  improved  Sleep: normal  Appetite: normal  Medication side effects: No   ROS: patient endorses headache, muscle aches from ECT, all other systems are negative     Vital signs in last 24 hours:  Temp:  [97 °F (36 1 °C)-98 1 °F (36 7 °C)] 97 6 °F (36 4 °C)  HR:  [] 90  Resp:  [16-20] 16  BP: (121-160)/() 129/76    Laboratory results:  I have personally reviewed all pertinent laboratory/tests results  No results found for this or any previous visit (from the past 48 hour(s))        Mental Status Evaluation:    Appearance:  age appropriate, casually dressed, adequate grooming, looks stated age, overtly appearing  female, in no apparent acute distress, appropriate eye contact   Behavior:  cooperative   Speech:  fluent, clear, coherent, slow, soft   Mood:  "hopeful"   Affect:  constricted, dysphoric   Thought Process:  organized, logical, coherent, goal directed, linear   Associations: intact associations   Thought Content:  no overt delusions   Perceptual Disturbances: Denies auditory or visual hallucinations and Does not appear to be responding to internal stimuli   Risk Potential: Suicidal ideation - None at present  Homicidal ideation - None at present  Potential for aggression - Not at present   Sensorium:  oriented to person, place and time/date   Memory:  recent and remote memory grossly intact   Consciousness:  alert and awake   Attention/Concentration: attention span and concentration are age appropriate   Insight:  partial   Judgment: partial   Gait/Station: slow gait   Motor Activity: no abnormal movements       Current Medications:  Current Facility-Administered Medications   Medication Dose Route Frequency Provider Last Rate   • acetaminophen  650 mg Oral Q6H PRN Anselmo Ornelas MD     • acetaminophen  650 mg Oral Q4H PRN Anselmo Ornelas MD     • acetaminophen  975 mg Oral Q6H PRN Anselmo Ornelas MD     • albuterol  2 puff Inhalation Q6H PRN Carmen Merrill MD     • benztropine  1 mg Intramuscular Q4H PRN Max 6/day Anselmo Ornelas MD     • benztropine  1 mg Oral Q4H PRN Max 6/day Anselmo Ornelas MD     • calcium carbonate  500 mg Oral Daily PRN Paula Davenport DO     • cholecalciferol  1,000 Units Oral Daily Anselmo Ornelas MD     • cloZAPine  100 mg Oral After Lunch Carmen Merrill MD     • cloZAPine  100 mg Oral Daily Carmen Merrill MD     • cloZAPine  200 mg Oral HS Carmen Merrill MD     • hydrOXYzine HCL  50 mg Oral Q6H PRN Max 4/day Anselmo Ornelas MD      Or   • diphenhydrAMINE  50 mg Intramuscular Q6H PRN Anselmo Ornelas MD     • docusate sodium  100 mg Oral BID Anselmo Ornelas MD     • fenofibrate  145 mg Oral Daily Anselmo Ornelas MD     • fluticasone  1 spray Each Nare BID Anselmo Ornelas MD     • glycerin-hypromellose-  1 drop Both Eyes Q3H PRN Anselmo Ornelas MD     • glycopyrrolate  1 mg Oral BID Carmen Merrill MD     • hydrocortisone   Topical 4x Daily PRN Sadie Nath PA-C     • hydrocortisone   Topical 4x Daily PRN Sadie Nath PA-C     • hydrOXYzine HCL  100 mg Oral Q6H PRN Max 4/day Charmayne Andreas, MD      Or   • LORazepam  2 mg Intramuscular Q6H PRN Charmayne Andreas, MD     • hydrOXYzine HCL  25 mg Oral Q6H PRN Max 4/day Charmayne Andreas, MD     • lidocaine  1 patch Topical Daily Charmayne Andreas, MD     • loratadine  10 mg Oral Daily Charmayne Andreas, MD     • magnesium hydroxide  30 mL Oral Daily PRN Sanjiv Means PA-C     • methocarbamol  500 mg Oral Q6H PRN Charmayne Andreas, MD     • nicotine  1 patch Transdermal Daily Charmayne Andreas, MD     • nicotine polacrilex  4 mg Oral Q2H PRN Charmayne Andreas, MD     • OLANZapine  10 mg Oral Q3H PRN Max 3/day Charmayne Andreas, MD      Or   • OLANZapine  10 mg Intramuscular Q3H PRN Max 3/day Charmayne Andreas, MD     • OLANZapine  5 mg Oral Q3H PRN Max 6/day Charmayne Andreas, MD      Or   • OLANZapine  5 mg Intramuscular Q3H PRN Max 6/day Charmayne Andreas, MD     • OLANZapine  2 5 mg Oral Q3H PRN Max 8/day Charmayne Andreas, MD     • pantoprazole  40 mg Oral Early Morning Charmayne Andreas, MD     • propranolol  10 mg Oral Q8H PRN Charmayne Andreas, MD     • senna-docusate sodium  1 tablet Oral HS Charmayne Andreas, MD     • senna-docusate sodium  1 tablet Oral Daily PRN Charmayne Andreas, MD     • sorbitol  30 mL Oral Q1H PRN Charmayne Andreas, MD     • traZODone  50 mg Oral HS PRN Charmayne Andreas, MD     • venlafaxine  150 mg Oral Daily Charmayne Andreas, MD         Suicide/Homicide Risk Assessment:  Risk of Harm to Self:   Nursing Suicide Risk Assessment Last 24 hours: C-SSRS Risk (Since Last Contact)  Calculated C-SSRS Risk Score (Since Last Contact): No Risk Indicated  Current Specific Risk Factors include: recent suicidal ideation, mental illness diagnosis, presence of delusions, previous attempts that led to ICU stay  Protective Factors: no current suicidal ideation, improved mood  Based on today's assessment, Gwen Grimaldo presents the following risk of harm to self: high    Risk of Harm to Others:  Nursing Homicide Risk Assessment: Violence Risk to Others: Denies within past 6 months  Current Specific Risk Factors include: diagnosis of mood disorder, concomitant thought disorder, multiple stressors  Protective Factors: improved impulse control, mood is stabilizing, no current psychotic symptoms  Based on today's assessment, Jenna Mood presents the following risk of harm to others: low    The following interventions are recommended: behavioral checks every 7 minutes, continued hospitalization on locked unit    Behavioral Health Medications: All current active meds have been reviewed  Changes as in plan section above  Risks, benefits and possible side effects of Medications:   Risks, benefits, and possible side effects of medications explained to patient and patient verbalizes understanding  Counseling / Coordination of Care:  Patient's progress discussed with staff in treatment team meeting  Medications, treatment progress and treatment plan reviewed with patient  Jennifer Hernandez DO 12/19/22  Psychiatry Resident, PGY-II    This note was completed in part utilizing Dragon dictation Software  Grammatical, translation, syntax errors, random word insertions, spelling mistakes, and incomplete sentences may be an occasional consequence of this system secondary to software limitations with voice recognition, ambient noise, and hardware issues  If you have any questions or concerns about the content, text, or information contained within the body of this dictation, please contact the provider for clarification

## 2022-12-19 NOTE — PROCEDURES
Procedure Note - ECT  Yann Castellanos 47 y o  female MRN: [id number]    Time out was taken with staff to confirm correct patient and correct procedure to be performed  Patient calm and cooperative  She reported a history of bad headaches with prior ECT treatment months ago  She states she had received morphine in the past and stopped ECT treatment after 3 sessions because of headaches and memory loss  Today, she endorses depressed mood, AH derogatory in nature  Agrees to proceed  She was assessed by Anesthesia and given Toradol which she reported was recommended by her physician on unit, tolerated well without side effects  Chart shows an allergy to Naproxen  Session Number: 001    Diagnosis: Principal Problem:    Schizoaffective disorder, bipolar type ([facility])  Active Problems:    LYNN (generalized anxiety disorder)    Hyperlipidemia    Post-traumatic stress disorder, chronic    Mild intermittent asthma without complication    Gastroesophageal reflux disease    Vitamin D deficiency    Dependence on nicotine from cigarettes    Mild neurocognitive disorder    Medical clearance for psychiatric admission    Non-seasonal allergic rhinitis    Chronic midline low back pain without sciatica      ECT Type: Inpatient    Anesthesia: Methohexital    Electrode Placement: Bilateral    Energy level: 50 %      Seizure Duration     EE Sec  EMG :35 Sec    Post-ictal Suppression Index:68 4 %    Results:Clinical seizure was satisfactory, Patient tolerated ECT well        Media Information    Clinical Image - Mobile Device   ECT   2022 06:47   Attached To:    Hospital Encounter on 22     Source Information    Shorty Galarza MD   [facility]       Vitals:    22 0655   BP: (!) 160/109   Pulse: (!) 116   Resp: 20   Temp:    SpO2: 92%        Medication Administration - last 24 hours from 2022 0709 to 2022 9342       Date/Time Order Dose Route Action Action by     2022 0835 EST cholecalciferol (VITAMIN D3) tablet 1,000 Units 1,000 Units Oral Given Lele Fermo, RN     12/18/2022 1745 EST docusate sodium (COLACE) capsule 100 mg 100 mg Oral Given [facility]     12/18/2022 5747 EST docusate sodium (COLACE) capsule 100 mg 100 mg Oral Given [facility]     12/18/2022 7328 EST fenofibrate (TRICOR) tablet 145 mg 145 mg Oral Given [facility]     12/18/2022 2150 EST lidocaine (LIDODERM) 5 % patch 1 patch 1 patch Topical Patch Removed Justus Joiner RN     12/18/2022 1472 EST lidocaine (LIDODERM) 5 % patch 1 patch 1 patch Topical Medication Applied Lele Fermo, RN     12/18/2022 1746 EST fluticasone (FLONASE) 50 mcg/act nasal spray 1 spray 1 spray Each Nare Not Given Lele Fermo, RN     12/18/2022 0836 EST fluticasone (FLONASE) 50 mcg/act nasal spray 1 spray 1 spray Each Nare Given Lele Fermo, RN     12/18/2022 1122 EST loratadine (CLARITIN) tablet 10 mg 10 mg Oral Given [facility]     12/18/2022 6351 EST nicotine (NICODERM CQ) 21 mg/24 hr TD 24 hr patch 1 patch 1 patch Transdermal Medication Applied Lele Fermo, RN     12/18/2022 0462 EST nicotine (NICODERM CQ) 21 mg/24 hr TD 24 hr patch 1 patch 1 patch Transdermal Patch Removed Lele Fermo, RN     12/18/2022 0836 EST pantoprazole (PROTONIX) EC tablet 40 mg 40 mg Oral Given [facility]     12/18/2022 2152 EST senna-docusate sodium (SENOKOT S) 8 6-50 mg per tablet 1 tablet 1 tablet Oral Given Justus Joiner RN     12/18/2022 0835 EST venlafaxine (EFFEXOR-XR) 24 hr capsule 150 mg 150 mg Oral Given [facility]     12/18/2022 1556 EST OLANZapine (ZyPREXA) tablet 10 mg 10 mg Oral Given [facility]     12/18/2022 1040 EST OLANZapine (ZyPREXA) tablet 10 mg 10 mg Oral Given Lele Salgado RN     12/18/2022 1556 EST OLANZapine (ZyPREXA) IM injection 10 mg -- Intramuscular See Alternative Lele Salgado RN     12/18/2022 1040 EST OLANZapine (ZyPREXA) IM injection 10 mg -- Intramuscular See Alternative Lele Salgado RN     12/18/2022 2151 EST traZODone (DESYREL) tablet 50 mg 50 mg [facility]     12/18/2022 2150 EST hydrOXYzine HCL (ATARAX) tablet 25 mg 25 mg Oral Given [facility] , RN     12/18/2022 1553 EST hydrOXYzine HCL (ATARAX) tablet 100 mg 100 mg Oral Given [facility], RN     12/18/2022 1553 EST LORazepam (ATIVAN) injection 2 mg -- Intramuscular See Alternative [facility], RN     12/18/2022 0836 EST albuterol (PROVENTIL HFA,VENTOLIN HFA) inhaler 2 puff 2 puff Inhalation Given [facility], RN     12/18/2022 1745 EST glycopyrrolate (ROBINUL) tablet 1 mg 1 mg Oral Given [facility], RN     12/18/2022 1644 EST glycopyrrolate (ROBINUL) tablet 1 mg 1 mg Oral Given [facility], RN     12/18/2022 9449 EST LORazepam (ATIVAN) tablet 0 5 mg 0 5 mg Oral Given [facility], RN     12/18/2022 1419 EST cloZAPine (CLOZARIL) tablet 100 mg 100 mg Oral Given [facility], RN     12/18/2022 8949 EST cloZAPine (CLOZARIL) tablet 100 mg 100 mg Oral Given [facility], RN     12/18/2022 2150 EST clozapine (CLOZARIL) tablet 200 mg 200 mg [facility]     12/18/2022 1553 EST calcium carbonate (TUMS) chewable tablet 500 mg 500 mg Oral Given [facility], RN

## 2022-12-19 NOTE — PROGRESS NOTES
12/19/22 0801   Team Meeting   Meeting Type Daily Rounds   Team Members Present   Team Members Present Physician;Nurse;   Physician Team Member Twining   Nursing Team Member [facility] Management Team Member Leeanna   Patient/Family Present   Patient Present No   Patient's Family Present No     Pt remains disorganized, paranoid and delusional  Compliant with meds and meals  PRN Zyprexa and Atarax given  Continue med management  Pt had 1 of 12 ECT session today  Continue to monitor  Discharge to be determined       Pt will return to [facility] located at [place], [place]

## 2022-12-19 NOTE — PLAN OF CARE
Problem: Alteration in Thoughts and Perception  Goal: Treatment Goal: Gain control of psychotic behaviors/thinking, reduce/eliminate presenting symptoms and demonstrate improved reality functioning upon discharge  Outcome: Progressing  Goal: Verbalize thoughts and feelings  Description: Interventions:  - Promote a nonjudgmental and trusting relationship with the patient through active listening and therapeutic communication  - Assess patient's level of functioning, behavior and potential for risk  - Engage patient in 1 on 1 interactions  - Encourage patient to express fears, feelings, frustrations, and discuss symptoms    - [place] patient to reality, help patient recognize reality-based thinking   - Administer medications as ordered and assess for potential side effects  - Provide the patient education related to the signs and symptoms of the illness and desired effects of prescribed medications  Outcome: Progressing  Goal: Refrain from acting on delusional thinking/internal stimuli  Description: Interventions:  - Monitor patient closely, per order   - Utilize least restrictive measures   - Set reasonable limits, give positive feedback for acceptable   - Administer medications as ordered and monitor of potential side effects  Outcome: Progressing     Problem: Ineffective Coping  Goal: Cooperates with admission process  Description: Interventions:   - Complete admission process  Outcome: Progressing  Goal: Identifies ineffective coping skills  Outcome: Progressing  Goal: Identifies healthy coping skills  Outcome: Progressing  Goal: Demonstrates healthy coping skills  Outcome: Progressing     Problem: Depression  Goal: Treatment Goal: Demonstrate behavioral control of depressive symptoms, verbalize feelings of improved mood/affect, and adopt new coping skills prior to discharge  Outcome: Progressing  Goal: Refrain from harming self  Description: Interventions:  - Monitor patient closely, per order   - Supervise medication ingestion, monitor effects and side effects   Outcome: Progressing  Goal: Refrain from isolation  Description: Interventions:  - Develop a trusting relationship   - Encourage socialization   Outcome: Progressing  Goal: Refrain from self-neglect  Outcome: Progressing     Problem: DISCHARGE PLANNING  Goal: Discharge to home or other facility with appropriate resources  Description: INTERVENTIONS:  - Identify barriers to discharge w/patient and caregiver  - Arrange for needed discharge resources and transportation as appropriate  - Identify discharge learning needs (meds, wound care, etc )  - Arrange for interpretive services to assist at discharge as needed  - Refer to Case Management Department for coordinating discharge planning if the patient needs post-hospital services based on physician/advanced practitioner order or complex needs related to functional status, cognitive ability, or social support system  Outcome: Progressing

## 2022-12-19 NOTE — PROGRESS NOTES
12/19/22 0930 12/19/22 1000   Activity/Group Checklist   Group [facility]  (goals) Wellness  (structured journaling)   Attendance Attended Attended   Attendance Duration (min) 0-15 16-30   Interactions Interacted appropriately Interacted appropriately   Affect/Mood Appropriate Appropriate   Goals Achieved Able to listen to others; Able to engage in interactions Able to listen to others; Able to engage in interactions

## 2022-12-19 NOTE — ANESTHESIA PREPROCEDURE EVALUATION
Procedure:  PRE-OP ONLY    Relevant Problems   CARDIO   (+) Hyperlipidemia      GI/HEPATIC   (+) Gastroesophageal reflux disease      MUSCULOSKELETAL   (+) Chronic midline low back pain without sciatica   (+) Degenerative disc disease, lumbar      NEURO/PSYCH   (+) Chronic midline low back pain without sciatica   (+) LYNN (generalized anxiety disorder)   (+) History of alcohol dependence (HCC)   (+) Other headache syndrome   (+) Post-traumatic stress disorder, chronic      PULMONARY   (+) Emphysema lung (HCC)   (+) Mild intermittent asthma without complication      Other   (+) Dependence on nicotine from cigarettes   (+) Schizoaffective disorder, bipolar type (HCC)             Anesthesia Plan  ASA Score- 3     Anesthesia Type- general with ASA Monitors  Additional Monitors:   Airway Plan:           Plan Factors-    Chart reviewed  EKG reviewed  Existing labs reviewed  Patient summary reviewed  Patient is a current smoker  Patient instructed to abstain from smoking on day of procedure  Patient did not smoke on day of surgery  Induction- intravenous  Postoperative Plan-     Informed Consent- Anesthetic plan and risks discussed with patient  I personally reviewed this patient with the CRNA  Discussed and agreed on the Anesthesia Plan with the CRNA               Lab Results   Component Value Date    HGBA1C 5 6 12/02/2022       Lab Results   Component Value Date     06/22/2018    K 4 5 12/15/2022     12/15/2022    CO2 28 12/15/2022    ANIONGAP 14 4 06/22/2018    BUN 16 12/15/2022    CREATININE 0 70 12/15/2022    GLUCOSE 74 08/20/2015    GLUF 114 (H) 12/15/2022    CALCIUM 10 0 12/15/2022    AST 31 12/15/2022    ALT 30 12/15/2022    ALKPHOS 69 12/15/2022    PROT 6 8 06/22/2018    BILITOT 0 2 (L) 06/22/2018    EGFR 100 12/15/2022       Lab Results   Component Value Date    WBC 7 72 12/15/2022    HGB 13 9 12/15/2022    HCT 43 0 12/15/2022    MCV 87 12/15/2022     12/15/2022    Sinus tachycardia  Low voltage QRS  Cannot rule out Inferior infarct (cited on or before 15-DEC-2022)  Abnormal ECG  When compared with ECG of 15-DEC-2022 14:41, (unconfirmed)  No significant change was found  Confirmed by Elaine Barrera ([id number]) on 12/15/2022 2:06:20 PM    PCP clearance reviewed

## 2022-12-20 RX ORDER — SODIUM CHLORIDE, SODIUM LACTATE, POTASSIUM CHLORIDE, CALCIUM CHLORIDE 600; 310; 30; 20 MG/100ML; MG/100ML; MG/100ML; MG/100ML
125 INJECTION, SOLUTION INTRAVENOUS CONTINUOUS
Status: CANCELLED | OUTPATIENT
Start: 2022-12-20

## 2022-12-20 RX ADMIN — HYDROCORTISONE: 25 CREAM TOPICAL at 16:44

## 2022-12-20 RX ADMIN — CLOZAPINE 100 MG: 100 TABLET ORAL at 13:32

## 2022-12-20 RX ADMIN — VENLAFAXINE HYDROCHLORIDE 150 MG: 150 CAPSULE, EXTENDED RELEASE ORAL at 09:00

## 2022-12-20 RX ADMIN — CLOZAPINE 100 MG: 100 TABLET ORAL at 08:59

## 2022-12-20 RX ADMIN — MAGNESIUM HYDROXIDE 30 ML: 400 SUSPENSION ORAL at 16:01

## 2022-12-20 RX ADMIN — FENOFIBRATE 145 MG: 145 TABLET, COATED ORAL at 09:00

## 2022-12-20 RX ADMIN — HYDROCORTISONE: 1 CREAM TOPICAL at 16:44

## 2022-12-20 RX ADMIN — GLYCOPYRROLATE 1 MG: 1 TABLET ORAL at 17:00

## 2022-12-20 RX ADMIN — SENNOSIDES AND DOCUSATE SODIUM 1 TABLET: 50; 8.6 TABLET ORAL at 20:42

## 2022-12-20 RX ADMIN — CHOLECALCIFEROL TAB 25 MCG (1000 UNIT) 1000 UNITS: 25 TAB at 09:00

## 2022-12-20 RX ADMIN — PANTOPRAZOLE SODIUM 40 MG: 40 TABLET, DELAYED RELEASE ORAL at 09:00

## 2022-12-20 RX ADMIN — FLUTICASONE PROPIONATE 1 SPRAY: 50 SPRAY, METERED NASAL at 17:00

## 2022-12-20 RX ADMIN — METHOCARBAMOL 500 MG: 500 TABLET, FILM COATED ORAL at 20:48

## 2022-12-20 RX ADMIN — OLANZAPINE 10 MG: 10 TABLET, FILM COATED ORAL at 10:22

## 2022-12-20 RX ADMIN — NICOTINE 1 PATCH: 21 PATCH, EXTENDED RELEASE TRANSDERMAL at 09:32

## 2022-12-20 RX ADMIN — GLYCOPYRROLATE 1 MG: 1 TABLET ORAL at 09:00

## 2022-12-20 RX ADMIN — DOCUSATE SODIUM 100 MG: 100 CAPSULE, LIQUID FILLED ORAL at 17:00

## 2022-12-20 RX ADMIN — CLOZAPINE 250 MG: 25 TABLET ORAL at 20:07

## 2022-12-20 RX ADMIN — FLUTICASONE PROPIONATE 1 SPRAY: 50 SPRAY, METERED NASAL at 08:59

## 2022-12-20 RX ADMIN — LIDOCAINE 1 PATCH: 50 PATCH CUTANEOUS at 09:31

## 2022-12-20 RX ADMIN — OLANZAPINE 10 MG: 10 TABLET, FILM COATED ORAL at 16:57

## 2022-12-20 RX ADMIN — LORATADINE 10 MG: 10 TABLET ORAL at 08:59

## 2022-12-20 RX ADMIN — DOCUSATE SODIUM 100 MG: 100 CAPSULE, LIQUID FILLED ORAL at 09:00

## 2022-12-20 NOTE — DISCHARGE INSTR - OTHER ORDERS
[facility] [place] located at [facility]    Your meds will be sent to preferred pharmacy at [facility]  After discharge, if you find your coping skills are not as effective and you continue to feel distressed please call [facility] services are available 24 hours a day by calling Jose Manuel Reeder [facility] [place] Crisis Call: [address] [facility] Free: [address] [facility] [address] : [phone]    [facility] : [id number]     If you feel you are a danger to yourself or others please contact 911 or go to nearest Emergency room to seek immediate help

## 2022-12-20 NOTE — PROGRESS NOTES
Progress Note - [address] [facility],[address] Adonis 47 y o  female MRN: [id number]   Unit/Bed#: [facility] 377-01 Encounter: [id number]    Behavior over the last 24 hours: karime Alexandre continues to feel depressed, helpless and hopeless  She rates mood as 8 on a scale of 1 (best mood) to 10 (worst mood) today  She still reports auditory hallucinations with derogatory comments - seems distressed and preoccupied  She still has persecutory delusions - per staff report she was concerned that she would lose hair due to ECT and that she was being "hypnotized"  Compliant with medications  Attends some groups      Sleep: normal  Appetite: normal  Medication side effects: No   ROS: reports headache, denies any shortness of breath or chest pain, all other systems are negative    Mental Status Evaluation:    Appearance:  casually dressed   Behavior:  cooperative, limited eye contact   Speech:  soft, tangential   Mood:  dysphoric, anxious   Affect:  blunted   Thought Process:  tangential, concrete   Associations: concrete associations   Thought Content:  persecutory delusions   Perceptual Disturbances: auditory hallucinations with derogatory comments, no visual hallucinations   Risk Potential: Suicidal ideation - Yes, without plan, contracts for safety on the unit  Homicidal ideation - None  Potential for aggression - No   Sensorium:  oriented to person, place and time/date   Memory:  recent and remote memory grossly intact   Consciousness:  alert and awake   Attention/Concentration: poor concentration and poor attention span   Insight:  poor   Judgment: poor   Gait/Station: normal gait/station, normal balance   Motor Activity: no abnormal movements     Vital signs in last 24 hours:    Temp:  [97 3 °F (36 3 °C)-98 2 °F (36 8 °C)] 98 2 °F (36 8 °C)  HR:  [81-99] 99  Resp:  [16-18] 16  BP: (100-139)/(65-84) 100/65    Laboratory results: I have personally reviewed all pertinent laboratory/tests results    Results from the past 24 hours: No results found for this or any previous visit (from the past 24 hour(s))  Suicide/Homicide Risk Assessment:    Risk of Harm to Self:   Nursing Suicide Risk Assessment Last 24 hours: C-SSRS Risk (Since Last Contact)  Calculated C-SSRS Risk Score (Since Last Contact): No Risk Indicated  Current Specific Risk Factors include: has suicidal ideation without a plan, mental illness diagnosis, current unstable mood, current psychotic symptoms  Protective Factors: ability to communicate with staff on the unit, able to contract for safety on the unit, taking medications as ordered on the unit  Based on today's assessment, Rosy Limon presents the following risk of harm to self: low    Risk of Harm to Others:  Nursing Homicide Risk Assessment: Violence Risk to Others: Denies within past 6 months  Based on today's assessment, Rosy Limon presents the following risk of harm to others: low    The following interventions are recommended: behavioral checks every 7 minutes, continued hospitalization on locked unit    Progress Toward Goals: no significant progress, remains psychotic, poor reality testing, still has passive suicidal thoughts    Assessment/Plan   Principal Problem:    Schizoaffective disorder, bipolar type ([facility] [address] )  Active Problems:    LYNN (generalized anxiety disorder)    Post-traumatic stress disorder, chronic    Mild neurocognitive disorder    Hyperlipidemia    Mild intermittent asthma without complication    Gastroesophageal reflux disease    Vitamin D deficiency    Dependence on nicotine from cigarettes    Medical clearance for psychiatric admission    Non-seasonal allergic rhinitis    Chronic midline low back pain without sciatica      Recommended Treatment:     Planned medication and treatment changes:     All current active medications have been reviewed  Encourage group therapy, milieu therapy and occupational therapy  Behavioral Health checks every 7 minutes  Continue ECT #2 tomorrow  Increase Clozaril to 100 mg bid and 250 mg at bedtime to help with psychotic symptoms  Check Clozaril level in 4 days  Monitor CBC/diff weekly     Continue all other medications:    Current Facility-Administered Medications   Medication Dose Route Frequency Provider Last Rate   • acetaminophen  650 mg Oral Q6H PRN Natalya Layton MD     • acetaminophen  650 mg Oral Q4H PRN Natalya Layton MD     • acetaminophen  975 mg Oral Q6H PRN Natalya Layton MD     • albuterol  2 puff Inhalation Q6H PRN Connie Zapata MD     • benztropine  1 mg Intramuscular Q4H PRN Max 6/day Natalya Layton MD     • benztropine  1 mg Oral Q4H PRN Max 6/day Natalya Layton MD     • calcium carbonate  500 mg Oral Daily PRN Tevin Davenport DO     • cholecalciferol  1,000 Units Oral Daily Natalya Layton MD     • cloZAPine  100 mg Oral After Lunch Connie Zapata MD     • cloZAPine  100 mg Oral Daily Connie Zapata MD     • cloZAPine  250 mg Oral HS Connie Zapata MD     • hydrOXYzine HCL  50 mg Oral Q6H PRN Max 4/day Natalya Layton MD      Or   • diphenhydrAMINE  50 mg Intramuscular Q6H PRN Natalya Layton MD     • docusate sodium  100 mg Oral BID Natalya Layton MD     • fenofibrate  145 mg Oral Daily Natalya Layton MD     • fluticasone  1 spray Each Nare BID Natalya Layton MD     • glycerin-hypromellose-  1 drop Both Eyes Q3H PRN Natalya Layton MD     • glycopyrrolate  1 mg Oral BID Connie Zapata MD     • hydrocortisone   Topical 4x Daily PRN Mariana Nevarez PA-C     • hydrocortisone   Topical 4x Daily PRN Mariana Nevarez PA-C     • hydrOXYzine HCL  100 mg Oral Q6H PRN Max 4/day Natalya Layton MD      Or   • LORazepam  2 mg Intramuscular Q6H PRN Natalya Layton MD     • hydrOXYzine HCL  25 mg Oral Q6H PRN Max 4/day Natalya Layton MD     • lidocaine  1 patch Topical Daily Natalya Layton MD     • loratadine  10 mg Oral Daily Ronen Ventura Deana Cage MD     • magnesium hydroxide  30 mL Oral Daily PRN Maria Ines Del Castillo PA-C     • methocarbamol  500 mg Oral Q6H PRN Karol Lee MD     • nicotine  1 patch Transdermal Daily Karol Lee MD     • nicotine polacrilex  4 mg Oral Q2H PRN Karol Lee MD     • OLANZapine  10 mg Oral Q3H PRN Max 3/day Karol Lee MD      Or   • OLANZapine  10 mg Intramuscular Q3H PRN Max 3/day Karol Lee MD     • OLANZapine  5 mg Oral Q3H PRN Max 6/day Karol Lee MD      Or   • OLANZapine  5 mg Intramuscular Q3H PRN Max 6/day Karol Lee MD     • OLANZapine  2 5 mg Oral Q3H PRN Max 8/day Karol Lee MD     • pantoprazole  40 mg Oral Early Morning Karol Lee MD     • propranolol  10 mg Oral Q8H PRN Karol Lee MD     • senna-docusate sodium  1 tablet Oral HS Karol Lee MD     • senna-docusate sodium  1 tablet Oral Daily PRN Karol Lee MD     • sorbitol  30 mL Oral Q1H PRN Karol Lee MD     • traZODone  50 mg Oral HS PRN Karol Lee MD     • venlafaxine  150 mg Oral Daily Karol Lee MD       Risks / Benefits of Treatment:    Risks, benefits, and possible side effects of medications explained to patient  Patient has limited understanding of risks and benefits of treatment at this time, but agrees to take medications as prescribed  Counseling / Coordination of Care:    Patient's progress discussed with staff in treatment team meeting  Medications, treatment progress and treatment plan reviewed with patient  Medication changes discussed with patient      Rosi Samayoa MD 12/20/22

## 2022-12-20 NOTE — PLAN OF CARE
Problem: Alteration in Thoughts and Perception  Goal: Treatment Goal: Gain control of psychotic behaviors/thinking, reduce/eliminate presenting symptoms and demonstrate improved reality functioning upon discharge  Outcome: Progressing     Problem: Ineffective Coping  Goal: Identifies ineffective coping skills  Outcome: Progressing  Goal: Identifies healthy coping skills  Outcome: Progressing  Goal: Demonstrates healthy coping skills  Outcome: Progressing     Problem: Depression  Goal: Treatment Goal: Demonstrate behavioral control of depressive symptoms, verbalize feelings of improved mood/affect, and adopt new coping skills prior to discharge  Outcome: Progressing     Problem: Electroconvulsive therapy (ECT)  Goal: Treatment Goal: Demonstrate a reduction of confusion and improved orientation to person, place, time and/or situation upon discharge, according to optimum baseline/ability  Outcome: Progressing  Goal: Verbalizes/displays adequate comfort level or baseline comfort level  Description: Interventions:  - Encourage patient to monitor pain and request assistance  - Assess pain using appropriate pain scale  - Administer analgesics based on type and severity of pain and evaluate response  - Implement non-pharmacological measures as appropriate and evaluate response  - Consider cultural and social influences on pain and pain management  - Notify physician/advanced practitioner if interventions unsuccessful or patient reports new pain  Outcome: Progressing  Goal: Achieves stable or improved neurological status  Description: INTERVENTIONS  - Monitor and report changes in neurological status  - Monitor vital signs such as temperature, blood pressure, glucose, and any other labs ordered   - Initiate measures to prevent increased intracranial pressure  - Monitor for seizure activity and implement precautions if appropriate      Outcome: Progressing  Goal: Achieves maximal functionality and self care  Description: INTERVENTIONS  - Monitor swallowing and airway patency with patient fatigue and changes in neurological status  - Encourage and assist patient to increase activity and self care     - Encourage visually impaired, hearing impaired and aphasic patients to use assistive/communication devices  Outcome: Progressing  Goal: Maintain or return mobility to safest level of function  Description: INTERVENTIONS:  - Assess patient's ability to carry out ADLs; assess patient's baseline for ADL function and identify physical deficits which impact ability to perform ADLs (bathing, care of mouth/teeth, toileting, grooming, dressing, etc )  - Assess/evaluate cause of self-care deficits   - Assess range of motion  - Assess patient's mobility  - Assess patient's need for assistive devices and provide as appropriate  - Encourage maximum independence but intervene and supervise when necessary  - Involve family in performance of ADLs  - Assess for home care needs following discharge   - Consider OT consult to assist with ADL evaluation and planning for discharge  - Provide patient education as appropriate  Outcome: Progressing  Goal: Absence of urinary retention  Description: INTERVENTIONS:  - Assess patient’s ability to void and empty bladder  - Monitor I/O  - Bladder scan as needed  - Discuss with physician/AP medications to alleviate retention as needed  - Discuss catheterization for long term situations as appropriate  Outcome: Progressing  Goal: Minimal or absence of nausea and/or vomiting  Description: INTERVENTIONS:  - Administer IV fluids if ordered to ensure adequate hydration  - Maintain NPO status until nausea and vomiting are resolved  - Nasogastric tube if ordered  - Administer ordered antiemetic medications as needed  - Provide nonpharmacologic comfort measures as appropriate  - Advance diet as tolerated, if ordered  - Consider nutrition services referral to assist patient with adequate nutrition and appropriate food choices  Outcome: Progressing  Goal: Maintains adequate nutritional intake  Description: INTERVENTIONS:  - Monitor percentage of each meal consumed  - Identify factors contributing to decreased intake, treat as appropriate  - Assist with meals as needed  - Monitor I&O, weight, and lab values if indicated  - Obtain nutrition services referral as needed  Outcome: Progressing     Problem: Ineffective Coping  Goal: Cooperates with admission process  Description: Interventions:   - Complete admission process  Outcome: Completed

## 2022-12-20 NOTE — CASE MANAGEMENT
Received voicemail from Neftali @ [facility] requesting update  Writer called back and spoke to Williamcarol ann [facility] at [phone]; provided status update and current med changes increase of Clozaril  Also, informed pt's tolerance to ECT #1 yesterday with brief headache and jaw pain and prn tylenol and robaxin  Olga Petersen reports he is very much aware of pt's as he has been know patient for last 13 years  He further noted of pt being on Clozaril 500 mg dose few years back  Also, confirmed pt has baseline hallucinations hearing voices  Writer advised of pt's 11 more ECT session  Olga Petersen requested to be involved in patient's discharge planning  Writer confirmed ACT team's fax number and added to patient's Carito Monaco also confirmed pt's preferred pharmacy is [facility]  Discharge transport may be provided by [facility] or [facility] -to be determined prior to discharge  Continue to coordinate

## 2022-12-20 NOTE — PROGRESS NOTES
12/20/22 0839   Team Meeting   Meeting Type Daily Rounds   Team Members Present   Physician Team Member [address] Team Member TOMMY SOLITARIOLAVONNE [facility] Management Team Member Leeanna   Patient/Family Present   Patient Present No   Patient's Family Present No     Pt remains paranoid, and reports hearing voices  Compliant with meds and meals  Given PRN Zyprexa for hallucinations and prn Tylenol, Robaxin for headaches post ECT  Continue med management -increase Clozaril  Continue ECT #2 tomorrow  Continue to monitor  Discharge to be determined

## 2022-12-21 ENCOUNTER — APPOINTMENT (INPATIENT)
Dept: PREOP/PACU | Facility: HOSPITAL | Age: 52
End: 2022-12-21
Attending: PSYCHIATRY & NEUROLOGY

## 2022-12-21 ENCOUNTER — ANESTHESIA EVENT (INPATIENT)
Dept: PREOP/PACU | Facility: HOSPITAL | Age: 52
End: 2022-12-21

## 2022-12-21 ENCOUNTER — ANESTHESIA (INPATIENT)
Dept: PREOP/PACU | Facility: HOSPITAL | Age: 52
End: 2022-12-21

## 2022-12-21 PROCEDURE — GZB4ZZZ OTHER ELECTROCONVULSIVE THERAPY: ICD-10-PCS | Performed by: PSYCHIATRY & NEUROLOGY

## 2022-12-21 RX ORDER — LABETALOL HYDROCHLORIDE 5 MG/ML
INJECTION, SOLUTION INTRAVENOUS AS NEEDED
Status: DISCONTINUED | OUTPATIENT
Start: 2022-12-21 | End: 2022-12-21

## 2022-12-21 RX ORDER — SUMATRIPTAN 50 MG/1
50 TABLET, FILM COATED ORAL DAILY PRN
Status: DISCONTINUED | OUTPATIENT
Start: 2022-12-21 | End: 2023-01-11 | Stop reason: HOSPADM

## 2022-12-21 RX ORDER — GLYCOPYRROLATE 0.2 MG/ML
INJECTION INTRAMUSCULAR; INTRAVENOUS AS NEEDED
Status: DISCONTINUED | OUTPATIENT
Start: 2022-12-21 | End: 2022-12-21

## 2022-12-21 RX ORDER — ESMOLOL HYDROCHLORIDE 10 MG/ML
INJECTION INTRAVENOUS AS NEEDED
Status: DISCONTINUED | OUTPATIENT
Start: 2022-12-21 | End: 2022-12-21

## 2022-12-21 RX ORDER — KETOROLAC TROMETHAMINE 30 MG/ML
INJECTION, SOLUTION INTRAMUSCULAR; INTRAVENOUS AS NEEDED
Status: DISCONTINUED | OUTPATIENT
Start: 2022-12-21 | End: 2022-12-21

## 2022-12-21 RX ORDER — SUCCINYLCHOLINE/SOD CL,ISO/PF 100 MG/5ML
SYRINGE (ML) INTRAVENOUS AS NEEDED
Status: DISCONTINUED | OUTPATIENT
Start: 2022-12-21 | End: 2022-12-21

## 2022-12-21 RX ORDER — SODIUM CHLORIDE 9 MG/ML
INJECTION, SOLUTION INTRAVENOUS CONTINUOUS PRN
Status: DISCONTINUED | OUTPATIENT
Start: 2022-12-21 | End: 2022-12-21

## 2022-12-21 RX ORDER — SODIUM CHLORIDE, SODIUM LACTATE, POTASSIUM CHLORIDE, CALCIUM CHLORIDE 600; 310; 30; 20 MG/100ML; MG/100ML; MG/100ML; MG/100ML
125 INJECTION, SOLUTION INTRAVENOUS CONTINUOUS
Status: DISCONTINUED | OUTPATIENT
Start: 2022-12-21 | End: 2022-12-21

## 2022-12-21 RX ADMIN — VENLAFAXINE HYDROCHLORIDE 150 MG: 150 CAPSULE, EXTENDED RELEASE ORAL at 08:17

## 2022-12-21 RX ADMIN — CLOZAPINE 100 MG: 100 TABLET ORAL at 08:16

## 2022-12-21 RX ADMIN — OLANZAPINE 5 MG: 5 TABLET, FILM COATED ORAL at 11:39

## 2022-12-21 RX ADMIN — SUMATRIPTAN SUCCINATE 50 MG: 50 TABLET ORAL at 10:38

## 2022-12-21 RX ADMIN — KETOROLAC TROMETHAMINE 30 MG: 30 INJECTION, SOLUTION INTRAMUSCULAR; INTRAVENOUS at 06:15

## 2022-12-21 RX ADMIN — FLUTICASONE PROPIONATE 1 SPRAY: 50 SPRAY, METERED NASAL at 17:09

## 2022-12-21 RX ADMIN — ESMOLOL HYDROCHLORIDE 50 MG: 100 INJECTION, SOLUTION INTRAVENOUS at 06:20

## 2022-12-21 RX ADMIN — GLYCOPYRROLATE 1 MG: 1 TABLET ORAL at 17:09

## 2022-12-21 RX ADMIN — FENOFIBRATE 145 MG: 145 TABLET, COATED ORAL at 08:17

## 2022-12-21 RX ADMIN — DOCUSATE SODIUM 100 MG: 100 CAPSULE, LIQUID FILLED ORAL at 17:09

## 2022-12-21 RX ADMIN — PANTOPRAZOLE SODIUM 40 MG: 40 TABLET, DELAYED RELEASE ORAL at 08:16

## 2022-12-21 RX ADMIN — LORATADINE 10 MG: 10 TABLET ORAL at 08:16

## 2022-12-21 RX ADMIN — HYDROXYZINE HYDROCHLORIDE 50 MG: 50 TABLET, FILM COATED ORAL at 16:06

## 2022-12-21 RX ADMIN — OLANZAPINE 10 MG: 10 TABLET, FILM COATED ORAL at 16:06

## 2022-12-21 RX ADMIN — GLYCOPYRROLATE 0.2 MG: 0.2 INJECTION, SOLUTION INTRAMUSCULAR; INTRAVENOUS at 06:15

## 2022-12-21 RX ADMIN — CLOZAPINE 250 MG: 25 TABLET ORAL at 20:21

## 2022-12-21 RX ADMIN — FLUTICASONE PROPIONATE 1 SPRAY: 50 SPRAY, METERED NASAL at 08:17

## 2022-12-21 RX ADMIN — NICOTINE 1 PATCH: 21 PATCH, EXTENDED RELEASE TRANSDERMAL at 08:17

## 2022-12-21 RX ADMIN — SODIUM CHLORIDE: 0.9 INJECTION, SOLUTION INTRAVENOUS at 06:07

## 2022-12-21 RX ADMIN — Medication 100 MG: at 06:20

## 2022-12-21 RX ADMIN — METHOCARBAMOL 500 MG: 500 TABLET, FILM COATED ORAL at 10:38

## 2022-12-21 RX ADMIN — METHOCARBAMOL 500 MG: 500 TABLET, FILM COATED ORAL at 17:14

## 2022-12-21 RX ADMIN — LABETALOL HYDROCHLORIDE 10 MG: 5 INJECTION, SOLUTION INTRAVENOUS at 06:33

## 2022-12-21 RX ADMIN — LIDOCAINE 1 PATCH: 50 PATCH CUTANEOUS at 08:18

## 2022-12-21 RX ADMIN — DOCUSATE SODIUM 100 MG: 100 CAPSULE, LIQUID FILLED ORAL at 08:17

## 2022-12-21 RX ADMIN — METHOHEXITAL SODIUM 100 MG: 500 INJECTION, POWDER, LYOPHILIZED, FOR SOLUTION INTRAMUSCULAR; INTRAVENOUS; RECTAL at 06:20

## 2022-12-21 RX ADMIN — CHOLECALCIFEROL TAB 25 MCG (1000 UNIT) 1000 UNITS: 25 TAB at 08:17

## 2022-12-21 RX ADMIN — CLOZAPINE 100 MG: 100 TABLET ORAL at 13:52

## 2022-12-21 RX ADMIN — SENNOSIDES AND DOCUSATE SODIUM 1 TABLET: 50; 8.6 TABLET ORAL at 20:21

## 2022-12-21 NOTE — PROGRESS NOTES
12/20/22 1415   Activity/Group Checklist   Group Other (Comment)  (Group Art Therapy/Psychodynamic, Open Choice with Acknowledging Discomfort with Discussion)   Attendance Attended   Attendance Duration (min) Greater than 60   Interactions Interacted appropriately  (Demonstrated some difficulty with short-term memory recall)   Affect/Mood Appropriate   Goals Achieved Able to listen to others; Able to engage in interactions; Able to recieve feedback; Able to give feedback to another  (Able to engage materials; full participation with discussion)

## 2022-12-21 NOTE — PROGRESS NOTES
Progress Note - Behavioral Health   Beena Taylor 47 y o  female MRN: [id number]  Unit/Bed#: [facility] 377-01 Encounter: [id number]    Assessment/Plan   Principal Problem:    Schizoaffective disorder, bipolar type ([address] )  Active Problems:    LYNN (generalized anxiety disorder)    Hyperlipidemia    Post-traumatic stress disorder, chronic    Mild intermittent asthma without complication    Gastroesophageal reflux disease    Vitamin D deficiency    Dependence on nicotine from cigarettes    Mild neurocognitive disorder    Medical clearance for psychiatric admission    Non-seasonal allergic rhinitis    Chronic midline low back pain without sciatica      Recommended Treatment:   No psychopharmacologic changes necessary at this moment; will continue to assess daily for further optimization  Discussed with medical and added 50mg sumatriptan daily prn for post-ECT headache    Continue with pharmacotherapy, group therapy, milieu therapy and occupational therapy  Continue to assess for adverse medication side effects  Encourage Nohemy Lamb to participate in nonverbal forms of therapy including journaling and art/music therapy  Continue frequent safety checks and vitals per unit protocol  Continue to engage CM/SW to assist with collateral, disposition planning, and the implementation of an individualized, patient-centered plan of care  Continue medical management by medical team   Case discussed with treatment team     Legal Status: 201  ------------------------------------------------------------    Subjective: All documentation including nursing notes, medication history to ensure medication adherence on the unit, labs, and vitals were reviewed  Nohemy Fuel was evaluated this morning for continuity of care and no acute distress noted throughout the evaluation  Over the past 24 hours per nursing report, Nohemy Gerber has been cooperative on the unit and compliant with medications   Patient has reported delusional statements late last night stating that "a year ago a man made me eat a mouse and I was on antibiotics"  Today, Sully Alarcon is consenting for safety on the unit  Sully Ni reports feeling "anxious " Sully Ni notes having improved sleep  Sully Ni states having a good appetite  Sully Ni has been taking the medications as prescribed and reporting no side effects  Patient again reports this morning that she was a little bit confused when she originally came back from ECT however the confusion has cleared and she is starting to experience the headache and jaw pain  Encouraged to ask for a muscle relaxant and discussed with the patient that I would be talking with medical today  Patient admits that she is feeling fine without Ativan however she did have a panic attack that bothered her  She states she was feeling anxious today because she had a panic attack which to her she experiences shortness of breath, palpitations, lightheadedness  Patient states that these are triggered by feeling as though she is getting hypnotized  When asked to explain further patient was rather circumstantial in her explanation but did identify a previous experience of being hypnotized without her consent  It is unclear if this is a delusion  Discussed with patient cognitive distortions and patient is willing to implement interrupting her thought process and looking for evidence, seeing that no one is hypnotizing her, this may help interrupt the panic attack process  Sully Ni denies suicidal ideations  Sully Ni denies homicidal ideations  Regarding hallucinations, Sully Ni denies and intermittently appears internally stimulated  Discussed with team this am and per her ACT team who has known her for 13 years, she is at her baseline with delusions and hallucinations       PRNs overnight: robaxin at night  VS: Reviewed, clinically obese, otherwise within normal limits    Progress Toward Goals: slow improvement    Psychiatric Review of Systems:  Behavior over the last 24 hours: improved  Sleep: normal  Appetite: normal  Medication side effects: No   ROS: all other systems are negative    Vital signs in last 24 hours:  Temp:  [97 8 °F (36 6 °C)-98 3 °F (36 8 °C)] 97 8 °F (36 6 °C)  HR:  [] 90  Resp:  [13-22] 17  BP: (114-171)/() 114/82    Laboratory results:  I have personally reviewed all pertinent laboratory/tests results  No results found for this or any previous visit (from the past 48 hour(s))        Mental Status Evaluation:    Appearance:  casually dressed, marginal hygiene, looks older than stated age, overweight, Overtly appearing  female, in no apparent acute distress, good eye contact   Behavior:  cooperative   Speech:  coherent, circumstantial   Mood:  "anxioius"   Affect:  constricted, anxioius   Thought Process:  circumstantial   Associations: intact associations   Thought Content:  bizarre delusions, paranoid ideation, negative thoughts, ruminations   Perceptual Disturbances: Denies auditory or visual hallucinations and Appears to be responding to internal stimuli   Risk Potential: Suicidal ideation - None at present  Homicidal ideation - None at present  Potential for aggression - Not at present   Sensorium:  oriented to person, place and time/date   Memory:  recent and remote memory grossly intact   Consciousness:  alert and awake   Attention/Concentration: attention span and concentration are age appropriate   Insight:  limited   Judgment: limited   Gait/Station: slow gait   Motor Activity: no abnormal movements       Current Medications:  Current Facility-Administered Medications   Medication Dose Route Frequency Provider Last Rate   • acetaminophen  650 mg Oral Q6H PRN Sada Dolan MD     • acetaminophen  650 mg Oral Q4H PRN Sada Dolan MD     • acetaminophen  975 mg Oral Q6H PRN Sada Dolan MD     • albuterol  2 puff Inhalation Q6H PRN Saman Flower MD     • benztropine  1 mg Intramuscular Q4H PRN Max 6/day Ortiz Her April Méndez MD     • benztropine  1 mg Oral Q4H PRN Max 6/day Jazmyn Chavarria MD     • calcium carbonate  500 mg Oral Daily PRN Elias Davenport DO     • cholecalciferol  1,000 Units Oral Daily Jazmyn Chavarria MD     • cloZAPine  100 mg Oral After Lunch Chepe Talbert MD     • cloZAPine  100 mg Oral Daily Chepe Talbert MD     • cloZAPine  250 mg Oral HS Chepe Talbert MD     • hydrOXYzine HCL  50 mg Oral Q6H PRN Max 4/day Jazmyn Chavarria MD      Or   • diphenhydrAMINE  50 mg Intramuscular Q6H PRN Jazmyn Chavarria MD     • docusate sodium  100 mg Oral BID Jazmyn Chavarria MD     • fenofibrate  145 mg Oral Daily Jazmyn Chavarria MD     • fluticasone  1 spray Each Nare BID Jazmyn Chavarria MD     • glycerin-hypromellose-  1 drop Both Eyes Q3H PRN Jazmyn Chavarria MD     • glycopyrrolate  1 mg Oral BID Chepe Talbert MD     • hydrocortisone   Topical 4x Daily PRN Leesa Rump, PA-C     • hydrocortisone   Topical 4x Daily PRN Leesa Rump, PA-C     • hydrOXYzine HCL  100 mg Oral Q6H PRN Max 4/day Jazmyn Chavarria MD      Or   • LORazepam  2 mg Intramuscular Q6H PRN Jazmyn Chavarria MD     • hydrOXYzine HCL  25 mg Oral Q6H PRN Max 4/day Jazmyn Chavarria MD     • lidocaine  1 patch Topical Daily Jazmyn Chavarria MD     • loratadine  10 mg Oral Daily Jazmyn Chavarria MD     • magnesium hydroxide  30 mL Oral Daily PRN Leesa Rump, PA-C     • methocarbamol  500 mg Oral Q6H PRN Jazmyn Chavarria MD     • nicotine  1 patch Transdermal Daily Jazmyn Chavarria MD     • nicotine polacrilex  4 mg Oral Q2H PRN Jazmyn Chavarria MD     • OLANZapine  10 mg Oral Q3H PRN Max 3/day Jazmyn Chavarria MD      Or   • OLANZapine  10 mg Intramuscular Q3H PRN Max 3/day Jazmyn Chavarria MD     • OLANZapine  5 mg Oral Q3H PRN Max 6/day Jazmyn Chavarria MD      Or   • OLANZapine  5 mg Intramuscular Q3H PRN Max 6/day Anselmo Ornelas MD     • OLANZapine  2 5 mg Oral Q3H PRN Max 8/day Anselmo Ornelas MD     • pantoprazole  40 mg Oral Early Morning Anselmo Ornelas MD     • propranolol  10 mg Oral Q8H PRN Anselmo Ornelas MD     • senna-docusate sodium  1 tablet Oral HS Anselmo Ornelas MD     • senna-docusate sodium  1 tablet Oral Daily PRN Anselmo Ornelas MD     • sorbitol  30 mL Oral Q1H PRN Anselmo Ornelas MD     • traZODone  50 mg Oral HS PRN Anselmo Ornelas MD     • venlafaxine  150 mg Oral Daily Anselmo Ornelas MD         Suicide/Homicide Risk Assessment:  Risk of Harm to Self:   Nursing Suicide Risk Assessment Last 24 hours: C-SSRS Risk (Since Last Contact)  Calculated C-SSRS Risk Score (Since Last Contact): No Risk Indicated  Current Specific Risk Factors include: recent suicidal ideation, previous attempts that have led to ICU stay, presence of hallucinations  Protective Factors: no current suicidal ideation, ability to communicate with staff on the unit, able to contract for safety on the unit, taking medications as ordered on the unit  Based on today's assessment, Nohemy Gerber presents the following risk of harm to self: high    Risk of Harm to Others:  Nursing Homicide Risk Assessment: Violence Risk to Others: Denies within past 6 months  Current Specific Risk Factors include: none  Protective Factors: no current homicidal ideation  Based on today's assessment, Nohemy [email or web address] presents the following risk of harm to others: minimal    The following interventions are recommended: behavioral checks every 7 minutes, continued hospitalization on locked unit    Behavioral Health Medications: All current active meds have been reviewed  Changes as in plan section above  Risks, benefits and possible side effects of Medications:   Risks, benefits, and possible side effects of medications explained to patient and patient verbalizes understanding        Counseling / Coordination of Care:  Patient's progress discussed with staff in treatment team meeting  Medications, treatment progress and treatment plan reviewed with patient  Tanvi OliveraDO 12/21/22  Psychiatry Resident, PGY-II    This note was completed in part utilizing Dragon dictation Software  Grammatical, translation, syntax errors, random word insertions, spelling mistakes, and incomplete sentences may be an occasional consequence of this system secondary to software limitations with voice recognition, ambient noise, and hardware issues  If you have any questions or concerns about the content, text, or information contained within the body of this dictation, please contact the provider for clarification

## 2022-12-21 NOTE — PROGRESS NOTES
12/21/22 1400   Activity/Group Checklist   Group Other (Comment)  (Group Art Therapy/Psychodynamic, Adapting to Change with Discussion)   Attendance Attended   Attendance Duration (min) 16-30  (Patient left group early complaining about a headache)   Interactions Did not interact   Affect/Mood Other (Comment)  (Mildy anxious and restless)   Goals Achieved Able to listen to others

## 2022-12-21 NOTE — PROGRESS NOTES
12/21/22 0831   Team Meeting   Meeting Type Daily Rounds   Team Members Present   Team Members Present Physician;Nurse;   Physician Team Member [address] Team Member TOMMY BANERJEE [facility] Team Member Leeanna   Patient/Family Present   Patient Present No   Patient's Family Present No     Pt reports hearing AH and HI and contracts for safety  Pt visible and attends groups  Compliant with meds and meals  Given prn Zyprexa 2x for agitation  Pt had ECT #2 of 12 today  Continue med management - monitor Clozaril, check level on Friday  Continue to monitor  Discharge to be determined

## 2022-12-21 NOTE — PROGRESS NOTES
12/21/22 1100   Activity/Group Checklist   Group   (recovery group)   Attendance Attended   Attendance Duration (min) 46-60   Interactions Interacted appropriately   Affect/Mood Appropriate   Goals Achieved Able to listen to others; Able to engage in interactions; Able to self-disclose     Patient is having difficulty participating and concentrating

## 2022-12-21 NOTE — ANESTHESIA POSTPROCEDURE EVALUATION
Post-Op Assessment Note    CV Status:  Stable  Pain Score: 0    Pain management: adequate     Mental Status:  Alert   Hydration Status:  Stable   PONV Controlled:  Controlled   Airway Patency:  Patent      Post Op Vitals Reviewed: Yes      Staff: CRNA         No notable events documented      BP      Temp      Pulse     Resp      SpO2

## 2022-12-21 NOTE — PROGRESS NOTES
This therapist attempted to meet with patient individually due to "high risk" status  Patient was not prepared to meet as she was involved in something else that she could not easily leave  This therapist will attempt to meet with her tomorrow

## 2022-12-21 NOTE — ANESTHESIA PREPROCEDURE EVALUATION
Procedure:  ECT INPATIENT    Relevant Problems   CARDIO   (+) Hyperlipidemia      GI/HEPATIC   (+) Gastroesophageal reflux disease      MUSCULOSKELETAL   (+) Chronic midline low back pain without sciatica   (+) Degenerative disc disease, lumbar      NEURO/PSYCH   (+) Chronic midline low back pain without sciatica   (+) LYNN (generalized anxiety disorder)   (+) History of alcohol dependence (HCC)   (+) Other headache syndrome   (+) Post-traumatic stress disorder, chronic      PULMONARY   (+) Emphysema lung (HCC)   (+) Mild intermittent asthma without complication      Respiratory   (+) Non-seasonal allergic rhinitis      Other   (+) Dependence on nicotine from cigarettes   (+) Mild neurocognitive disorder   (+) Schizoaffective disorder, bipolar type (HCC)   (+) Tobacco abuse        Physical Exam    Airway    Mallampati score: II  TM Distance: >3 FB  Neck ROM: full     Dental       Cardiovascular      Pulmonary      Other Findings        Anesthesia Plan  ASA Score- 3     Anesthesia Type- general with ASA Monitors  Additional Monitors:   Airway Plan:           Plan Factors-Exercise tolerance (METS): >4 METS  Chart reviewed  Patient summary reviewed  Patient did not smoke on day of surgery  Induction- intravenous  Postoperative Plan-     Informed Consent- Anesthetic plan and risks discussed with patient  I personally reviewed this patient with the CRNA  Discussed and agreed on the Anesthesia Plan with the CRNA  Anais Anderson

## 2022-12-21 NOTE — PROCEDURES
Procedure Note - ECT  Tianna Vásquez 47 y o  female MRN: [id number]    Time out was taken with staff to confirm correct patient and correct procedure to be performed  This morning Cady Meeks reports she is doing "okay"  She reports that her first session of ECT went well on Monday, but has experienced the side effects of headaches and mild memory loss as a result of the initial procedure  Cady Meeks was educated that these symptoms will continue to be monitored and should improve following completion of treatment course  At the time of the procedure Cady Meeks denies suicidal ideation, homicidal ideation, visual and auditory hallucinations  Tianna Vásquez agrees to proceed with ECT treatment  Session Number: 002    Diagnosis: Principal Problem:    Schizoaffective disorder, bipolar type ([facility])  Active Problems:    LYNN (generalized anxiety disorder)    Hyperlipidemia    Post-traumatic stress disorder, chronic    Mild intermittent asthma without complication    Gastroesophageal reflux disease    Vitamin D deficiency    Dependence on nicotine from cigarettes    Mild neurocognitive disorder    Medical clearance for psychiatric admission    Non-seasonal allergic rhinitis    Chronic midline low back pain without sciatica      ECT Type: Inpatient, Acute    Anesthesia: Brevital 100 mg IV    Electrode Placement: bilateral    Energy level: 50%     Media Information      Seizure Duration     EE seconds  EMG: No EMG activity was detected by Thymatron  Brief nonsustained contractions lasting no more than 5 seconds were visualized  PSI: 70 9%     Results:  Clinical seizure was satisfactory, Patient tolerated ECT well  At the time of procedure patient received toradol 30 mg IV for pain and esmolol 50 mg IV and labetalol 10 mg IV for elevated blood pressures  Following the procedure patient was seen resting comfortably in no acute distress      Vitals:    22 0644   BP: 119/86   Pulse: 96   Resp: 13   Temp:    SpO2: 94% Medication Administration - last 24 hours from 12/20/2022 0700 to 12/21/2022 0700       Date/Time Order Dose Route Action Action by     12/20/2022 0900 EST cholecalciferol (VITAMIN D3) tablet 1,000 Units 1,000 Units Oral Given Stacey James RN     12/20/2022 1700 EST docusate sodium (COLACE) capsule 100 mg 100 mg Oral Given Callie Benoit RN     12/20/2022 0900 EST docusate sodium (COLACE) capsule 100 mg 100 mg Oral Given Stacey James RN     12/20/2022 0900 EST fenofibrate (TRICOR) tablet 145 mg 145 mg Oral Given Stacey James RN     12/20/2022 2022 EST lidocaine (LIDODERM) 5 % patch 1 patch 1 patch Topical Patch Removed Guerline Lares RN     12/20/2022 6785 EST lidocaine (LIDODERM) 5 % patch 1 patch 1 patch Topical Medication Applied Stacey James RN     12/20/2022 1700 EST fluticasone (FLONASE) 50 mcg/act nasal spray 1 spray 1 spray Each Nare Given Callie Benoit RN     12/20/2022 0859 EST fluticasone (FLONASE) 50 mcg/act nasal spray 1 spray 1 spray Each Nare Given Stacey James RN     12/20/2022 6339 EST loratadine (CLARITIN) tablet 10 mg 10 mg Oral Given Stacey James RN     12/20/2022 0932 EST nicotine (NICODERM CQ) 21 mg/24 hr TD 24 hr patch 1 patch 1 patch Transdermal Medication Applied Stacey James RN     12/20/2022 0902 EST nicotine (NICODERM CQ) 21 mg/24 hr TD 24 hr patch 1 patch 1 patch Transdermal Patch Removed Stacey James RN     12/20/2022 0900 EST pantoprazole (PROTONIX) EC tablet 40 mg 40 mg Oral Given Stacey James RN     12/20/2022 2042 EST senna-docusate sodium (SENOKOT S) 8 6-50 mg per tablet 1 tablet 1 tablet Oral Given Guerline Lares RN     12/20/2022 0900 EST venlafaxine (EFFEXOR-XR) 24 hr capsule 150 mg 150 mg Oral Given Stacey James RN     12/20/2022 2048 EST methocarbamol (ROBAXIN) tablet 500 mg 500 mg Oral Given Guerline Lares, YUDY     12/20/2022 1701 EST OLANZapine (ZyPREXA) tablet 5 mg 5 mg Oral Not Given Callie Benoit RN     12/20/2022 1701 EST OLANZapine (ZyPREXA) IM injection 5 mg -- Intramuscular See Alternative Lethia Shell, RN     12/20/2022 1657 EST OLANZapine (ZyPREXA) tablet 10 mg 10 mg Oral Given Lethia Shell, RN     12/20/2022 1022 EST OLANZapine (ZyPREXA) tablet 10 mg 10 mg Oral Given Lethia Shell, RN     12/20/2022 1657 EST OLANZapine (ZyPREXA) IM injection 10 mg -- Intramuscular See Alternative Lethia Shell, RN     12/20/2022 1022 EST OLANZapine (ZyPREXA) IM injection 10 mg -- Intramuscular See Alternative Lethia Shell, RN     12/20/2022 1700 EST glycopyrrolate (ROBINUL) tablet 1 mg 1 mg Oral Given Lethia Shell, RN     12/20/2022 0900 EST glycopyrrolate (ROBINUL) tablet 1 mg 1 mg Oral Given Malaika Kirti, RN     12/20/2022 1644 EST hydrocortisone (ANUSOL-HC) 2 5 % rectal cream -- Topical Given Lethia Shell, RN     12/20/2022 1644 EST hydrocortisone 1 % cream -- Topical Given Lethia Shell, RN     12/20/2022 1601 EST magnesium hydroxide (MILK OF MAGNESIA) oral suspension 30 mL 30 mL Oral Given Lethia Shell, RN     12/20/2022 1332 EST cloZAPine (CLOZARIL) tablet 100 mg 100 mg Oral Given Lethia Shell, RN     12/20/2022 0859 EST cloZAPine (CLOZARIL) tablet 100 mg 100 mg Oral Given Malaika Kirti, RN     12/20/2022 2007 EST cloZAPine (CLOZARIL) tablet 250 mg 250 mg Oral Given Lisandra Gavel, RN     12/21/2022 9644 EST lactated ringers infusion 0 mL/hr Intravenous Stopped [place] Bud, RN     12/21/2022 3093 EST sodium chloride 0 9 % infusion 0 mL/hr Intravenous Stopped [place] Bud, RN     12/21/2022 8838 EST sodium chloride 0 9 % infusion -- Intravenous New Bag Brisa Cary, CRNA

## 2022-12-21 NOTE — PLAN OF CARE
Problem: Alteration in Thoughts and Perception  Goal: Treatment Goal: Gain control of psychotic behaviors/thinking, reduce/eliminate presenting symptoms and demonstrate improved reality functioning upon discharge  Outcome: Progressing     Problem: Ineffective Coping  Goal: Identifies ineffective coping skills  Outcome: Progressing  Goal: Identifies healthy coping skills  Outcome: Progressing  Goal: Demonstrates healthy coping skills  Outcome: Progressing     Problem: Depression  Goal: Treatment Goal: Demonstrate behavioral control of depressive symptoms, verbalize feelings of improved mood/affect, and adopt new coping skills prior to discharge  Outcome: Progressing     Problem: Electroconvulsive therapy (ECT)  Goal: Treatment Goal: Demonstrate a reduction of confusion and improved orientation to person, place, time and/or situation upon discharge, according to optimum baseline/ability  Outcome: Progressing  Goal: Verbalizes/displays adequate comfort level or baseline comfort level  Description: Interventions:  - Encourage patient to monitor pain and request assistance  - Assess pain using appropriate pain scale  - Administer analgesics based on type and severity of pain and evaluate response  - Implement non-pharmacological measures as appropriate and evaluate response  - Consider cultural and social influences on pain and pain management  - Notify physician/advanced practitioner if interventions unsuccessful or patient reports new pain  Outcome: Progressing  Goal: Achieves stable or improved neurological status  Description: INTERVENTIONS  - Monitor and report changes in neurological status  - Monitor vital signs such as temperature, blood pressure, glucose, and any other labs ordered   - Initiate measures to prevent increased intracranial pressure  - Monitor for seizure activity and implement precautions if appropriate      Outcome: Progressing  Goal: Achieves maximal functionality and self care  Description: INTERVENTIONS  - Monitor swallowing and airway patency with patient fatigue and changes in neurological status  - Encourage and assist patient to increase activity and self care     - Encourage visually impaired, hearing impaired and aphasic patients to use assistive/communication devices  Outcome: Progressing  Goal: Maintain or return mobility to safest level of function  Description: INTERVENTIONS:  - Assess patient's ability to carry out ADLs; assess patient's baseline for ADL function and identify physical deficits which impact ability to perform ADLs (bathing, care of mouth/teeth, toileting, grooming, dressing, etc )  - Assess/evaluate cause of self-care deficits   - Assess range of motion  - Assess patient's mobility  - Assess patient's need for assistive devices and provide as appropriate  - Encourage maximum independence but intervene and supervise when necessary  - Involve family in performance of ADLs  - Assess for home care needs following discharge   - Consider OT consult to assist with ADL evaluation and planning for discharge  - Provide patient education as appropriate  Outcome: Progressing  Goal: Absence of urinary retention  Description: INTERVENTIONS:  - Assess patient’s ability to void and empty bladder  - Monitor I/O  - Bladder scan as needed  - Discuss with physician/AP medications to alleviate retention as needed  - Discuss catheterization for long term situations as appropriate  Outcome: Progressing  Goal: Minimal or absence of nausea and/or vomiting  Description: INTERVENTIONS:  - Administer IV fluids if ordered to ensure adequate hydration  - Maintain NPO status until nausea and vomiting are resolved  - Nasogastric tube if ordered  - Administer ordered antiemetic medications as needed  - Provide nonpharmacologic comfort measures as appropriate  - Advance diet as tolerated, if ordered  - Consider nutrition services referral to assist patient with adequate nutrition and appropriate food choices  Outcome: Progressing  Goal: Maintains adequate nutritional intake  Description: INTERVENTIONS:  - Monitor percentage of each meal consumed  - Identify factors contributing to decreased intake, treat as appropriate  - Assist with meals as needed  - Monitor I&O, weight, and lab values if indicated  - Obtain nutrition services referral as needed  Outcome: Progressing

## 2022-12-22 LAB
BASOPHILS # BLD AUTO: 0.03 THOUSANDS/ÂΜL (ref 0–0.1)
BASOPHILS NFR BLD AUTO: 0 % (ref 0–1)
EOSINOPHIL # BLD AUTO: 0.44 THOUSAND/ÂΜL (ref 0–0.61)
EOSINOPHIL NFR BLD AUTO: 6 % (ref 0–6)
ERYTHROCYTE [DISTWIDTH] IN BLOOD BY AUTOMATED COUNT: 14.6 % (ref 11.6–15.1)
HCT VFR BLD AUTO: 41.5 % (ref 34.8–46.1)
HGB BLD-MCNC: 12.7 G/DL (ref 11.5–15.4)
IMM GRANULOCYTES # BLD AUTO: 0.07 THOUSAND/UL (ref 0–0.2)
IMM GRANULOCYTES NFR BLD AUTO: 1 % (ref 0–2)
LYMPHOCYTES # BLD AUTO: 1.49 THOUSANDS/ÂΜL (ref 0.6–4.47)
LYMPHOCYTES NFR BLD AUTO: 20 % (ref 14–44)
MCH RBC QN AUTO: 27.6 PG (ref 26.8–34.3)
MCHC RBC AUTO-ENTMCNC: 30.6 G/DL (ref 31.4–37.4)
MCV RBC AUTO: 90 FL (ref 82–98)
MONOCYTES # BLD AUTO: 0.6 THOUSAND/ÂΜL (ref 0.17–1.22)
MONOCYTES NFR BLD AUTO: 8 % (ref 4–12)
NEUTROPHILS # BLD AUTO: 4.9 THOUSANDS/ÂΜL (ref 1.85–7.62)
NEUTS SEG NFR BLD AUTO: 65 % (ref 43–75)
NRBC BLD AUTO-RTO: 0 /100 WBCS
PLATELET # BLD AUTO: 248 THOUSANDS/UL (ref 149–390)
PMV BLD AUTO: 9.2 FL (ref 8.9–12.7)
RBC # BLD AUTO: 4.6 MILLION/UL (ref 3.81–5.12)
WBC # BLD AUTO: 7.53 THOUSAND/UL (ref 4.31–10.16)

## 2022-12-22 RX ADMIN — DOCUSATE SODIUM 100 MG: 100 CAPSULE, LIQUID FILLED ORAL at 08:47

## 2022-12-22 RX ADMIN — LIDOCAINE 1 PATCH: 50 PATCH CUTANEOUS at 08:47

## 2022-12-22 RX ADMIN — FENOFIBRATE 145 MG: 145 TABLET, COATED ORAL at 08:47

## 2022-12-22 RX ADMIN — DOCUSATE SODIUM 100 MG: 100 CAPSULE, LIQUID FILLED ORAL at 17:07

## 2022-12-22 RX ADMIN — FLUTICASONE PROPIONATE 1 SPRAY: 50 SPRAY, METERED NASAL at 08:52

## 2022-12-22 RX ADMIN — TRAZODONE HYDROCHLORIDE 50 MG: 50 TABLET ORAL at 23:57

## 2022-12-22 RX ADMIN — FLUTICASONE PROPIONATE 1 SPRAY: 50 SPRAY, METERED NASAL at 17:07

## 2022-12-22 RX ADMIN — CHOLECALCIFEROL TAB 25 MCG (1000 UNIT) 1000 UNITS: 25 TAB at 08:47

## 2022-12-22 RX ADMIN — VENLAFAXINE HYDROCHLORIDE 150 MG: 150 CAPSULE, EXTENDED RELEASE ORAL at 08:47

## 2022-12-22 RX ADMIN — GLYCOPYRROLATE 1 MG: 1 TABLET ORAL at 17:07

## 2022-12-22 RX ADMIN — CLOZAPINE 100 MG: 100 TABLET ORAL at 14:05

## 2022-12-22 RX ADMIN — PANTOPRAZOLE SODIUM 40 MG: 40 TABLET, DELAYED RELEASE ORAL at 08:47

## 2022-12-22 RX ADMIN — GLYCOPYRROLATE 1 MG: 1 TABLET ORAL at 08:47

## 2022-12-22 RX ADMIN — NICOTINE 1 PATCH: 21 PATCH, EXTENDED RELEASE TRANSDERMAL at 08:46

## 2022-12-22 RX ADMIN — CLOZAPINE 250 MG: 25 TABLET ORAL at 21:04

## 2022-12-22 RX ADMIN — SENNOSIDES AND DOCUSATE SODIUM 1 TABLET: 50; 8.6 TABLET ORAL at 19:39

## 2022-12-22 RX ADMIN — OLANZAPINE 10 MG: 10 TABLET, FILM COATED ORAL at 15:07

## 2022-12-22 RX ADMIN — CLOZAPINE 100 MG: 100 TABLET ORAL at 08:47

## 2022-12-22 RX ADMIN — ALBUTEROL SULFATE 2 PUFF: 90 AEROSOL, METERED RESPIRATORY (INHALATION) at 17:09

## 2022-12-22 RX ADMIN — LORATADINE 10 MG: 10 TABLET ORAL at 08:47

## 2022-12-22 RX ADMIN — HYDROXYZINE HYDROCHLORIDE 50 MG: 50 TABLET, FILM COATED ORAL at 10:19

## 2022-12-22 RX ADMIN — OLANZAPINE 5 MG: 5 TABLET, FILM COATED ORAL at 10:18

## 2022-12-22 NOTE — PROGRESS NOTES
12/22/22 0884   Team Meeting   Meeting Type Daily Rounds   Team Members Present   Team Members Present Physician;Nurse;   Physician Team Member [address] Team Member TOMMY BANERJEE [facility] Management Team Member Leeanna   Patient/Family Present   Patient Present No   Patient's Family Present No      Pt remains paranoid and reports of hearing voices  Compliant with meds and meals  Attends some groups  Given prn Zyprexa for agitation  Continue Clozaril, check level on Friday  Continue to monitor  Discharge to be determined

## 2022-12-22 NOTE — PROGRESS NOTES
This therapist met with patient individually due to "high risk" status  Patient presented as upbeat, cooperative and attentive  Patient was briefed on high risk meaning and objectives with more focused therapy  Patient able to identify stressors/obstacles can form as paranoid thoughts and her need to manage that more efficiently  This therapy provided support and feedback  Patient was able to complete DERS form  This therapist will meet with patient regularly, as schedule allows

## 2022-12-22 NOTE — CASE MANAGEMENT
Writer approached pt in the hallway, pt was making phone calls prior to this meeting  Pt reports her post ECT headaches are not as bad as on other day  Discussed with patient's request about having group home staff visiting patient at the unit  First pt states she does not want the group home staff to visit her but he counselor from ACT team  Pt further asks writer "Are you going to help me finding a new group home? Because people at the group home are trying to control my life  I need to move out"  When asked, pt did not give a specific reason why pt is requesting look for a new group home  Writer advised of unit's current visitation policy limited only to pt's community providers  Pt was informed that [facility] ACT team is aware of pt's current admission and a team member may visit patient in near future  Pt appears confused and paranoid about group home staff trying to control her life  Pt denies SI, HI VH  However reports hearing "chitter-chatter" voices  Continue to monitor

## 2022-12-22 NOTE — PROGRESS NOTES
12/22/22 1100   Activity/Group Checklist   Group   (recovery group)   Attendance Attended   Attendance Duration (min) 16-30   Interactions Interacted appropriately   Affect/Mood Other (Comment)  (difficulty concentrating after ECT)   Goals Achieved Discussed coping strategies; Discussed self-esteem issues; Able to listen to others; Able to engage in interactions

## 2022-12-22 NOTE — PROGRESS NOTES
Progress Note - Behavioral Health   Debbi Godinez 47 y o  female MRN: [id number]  Unit/Bed#: [facility] 377-01 Encounter: [id number]    Assessment/Plan   Principal Problem:    Schizoaffective disorder, bipolar type ([address] )  Active Problems:    LYNN (generalized anxiety disorder)    Hyperlipidemia    Post-traumatic stress disorder, chronic    Mild intermittent asthma without complication    Gastroesophageal reflux disease    Vitamin D deficiency    Dependence on nicotine from cigarettes    Mild neurocognitive disorder    Medical clearance for psychiatric admission    Non-seasonal allergic rhinitis    Chronic midline low back pain without sciatica      Recommended Treatment:   No psychopharmacologic changes necessary at this moment; will continue to assess daily for further optimization  Continue with pharmacotherapy, group therapy, milieu therapy and occupational therapy  Continue to assess for adverse medication side effects  Encourage Sarah Lamb to participate in nonverbal forms of therapy including journaling and art/music therapy  Continue frequent safety checks and vitals per unit protocol  Continue to engage CM/SW to assist with collateral, disposition planning, and the implementation of an individualized, patient-centered plan of care  Continue medical management by medical team   Case discussed with treatment team   Patient was given a mocha today scored a 24 out of 30    Legal Status: 201  ------------------------------------------------------------    Subjective: All documentation including nursing notes, medication history to ensure medication adherence on the unit, labs, and vitals were reviewed  Sarah Robles was evaluated this morning for continuity of care and no acute distress noted throughout the evaluation  Over the past 24 hours per nursing report, Sarah Robles has been cooperative on the unit and compliant with medications  Today, Sarah Robles is consenting for safety on the unit   Sarah Robles reports feeling "ok " Lien Pillai notes having good sleep  Lien Pillai states having a good appetite  Lien Pillai has been taking the medications as prescribed and reporting no side effects  Imitrex was useful for her post-ECT headache yesterday  Lien Pillai denies suicidal ideations  Lien Pillai denies homicidal ideations  Regarding hallucinations, Lien Pillai denies and does appear to be responding to internal stimuli intermittently  PRNs overnight: atarax for anxiety, robaxin and imitrex for post-ECT sxs  VS: Reviewed, clinically obese, otherwise within normal limits    Progress Toward Goals: slow improvement    Psychiatric Review of Systems:  Behavior over the last 24 hours:  improved  Sleep: normal  Appetite: normal  Medication side effects: No   ROS: all other systems are negative    Vital signs in last 24 hours:  Temp:  [97 3 °F (36 3 °C)-97 6 °F (36 4 °C)] 97 6 °F (36 4 °C)  HR:  [] 105  Resp:  [16] 16  BP: (117-126)/(71-81) 117/71    Laboratory results:  I have personally reviewed all pertinent laboratory/tests results    Recent Results (from the past 48 hour(s))   CBC and differential    Collection Time: 12/22/22  6:17 AM   Result Value Ref Range    WBC 7 53 4 31 - 10 16 Thousand/uL    RBC 4 60 3 81 - 5 12 Million/uL    Hemoglobin 12 7 11 5 - 15 4 g/dL    Hematocrit 41 5 34 8 - 46 1 %    MCV 90 82 - 98 fL    MCH 27 6 26 8 - 34 3 pg    MCHC 30 6 (L) 31 4 - 37 4 g/dL    RDW 14 6 11 6 - 15 1 %    MPV 9 2 8 9 - 12 7 fL    Platelets 729 531 - 276 Thousands/uL    nRBC 0 /100 WBCs    Neutrophils Relative 65 43 - 75 %    Immat GRANS % 1 0 - 2 %    Lymphocytes Relative 20 14 - 44 %    Monocytes Relative 8 4 - 12 %    Eosinophils Relative 6 0 - 6 %    Basophils Relative 0 0 - 1 %    Neutrophils Absolute 4 90 1 85 - 7 62 Thousands/µL    Immature Grans Absolute 0 07 0 00 - 0 20 Thousand/uL    Lymphocytes Absolute 1 49 0 60 - 4 47 Thousands/µL    Monocytes Absolute 0 60 0 17 - 1 22 Thousand/µL    Eosinophils Absolute 0 44 0 00 - 0 61 Thousand/µL    Basophils Absolute 0 03 0 00 - 0 10 Thousands/µL         Mental Status Evaluation:    Appearance:  casually dressed, marginal hygiene, overweight, overtly appearing  female, in no apparent acute distress, good eye contact   Behavior:  cooperative, bizarre   Speech:  normal rate and volume   Mood:  "ok"   Affect:  constricted, Dysphoric   Thought Process:  disorganized   Associations: circumstantial associations   Thought Content:  persecutory delusions, paranoid ideation, ideas of reference, ruminations and bizarre delusions   Perceptual Disturbances: Denies auditory or visual hallucinations and Appears to be responding to internal stimuli   Risk Potential: Suicidal ideation - None at present but the patient is afraid of attempting suicide upon discharge  Homicidal ideation - None at present  Potential for aggression - Not at present   Sensorium:  oriented to person, place and time/date   Memory:  recent and remote memory grossly intact   Consciousness:  alert and awake   Attention/Concentration: attention span and concentration appear shorter than expected for age   Insight:  partial   Judgment: partial   Gait/Station: normal gait/station   Motor Activity: no abnormal movements       Current Medications:  Current Facility-Administered Medications   Medication Dose Route Frequency Provider Last Rate   • acetaminophen  650 mg Oral Q6H PRN Evert Betts MD     • acetaminophen  650 mg Oral Q4H PRN Evert Betts MD     • acetaminophen  975 mg Oral Q6H PRN Evert Betts MD     • albuterol  2 puff Inhalation Q6H PRN Jo-Ann Hale MD     • benztropine  1 mg Intramuscular Q4H PRN Max 6/day Evert Betts MD     • benztropine  1 mg Oral Q4H PRN Max 6/day Evert Betts MD     • calcium carbonate  500 mg Oral Daily PRN Karoline Davenport DO     • cholecalciferol  1,000 Units Oral Daily Evert Betts MD     • cloZAPine  100 mg Oral After Tasia Tompkins MD     • cloZAPine  100 mg Oral Daily Aura Holbrook MD     • cloZAPine  250 mg Oral HS Aura Holbrook MD     • hydrOXYzine HCL  50 mg Oral Q6H PRN Max 4/day Rock Alves MD      Or   • diphenhydrAMINE  50 mg Intramuscular Q6H PRN Rock Alves MD     • docusate sodium  100 mg Oral BID Rock Alves MD     • fenofibrate  145 mg Oral Daily Rock Alves MD     • fluticasone  1 spray Each Nare BID Rock Alves MD     • glycerin-hypromellose-  1 drop Both Eyes Q3H PRN Rock Alves MD     • glycopyrrolate  1 mg Oral BID Aura Holbrook MD     • hydrocortisone   Topical 4x Daily PRN FAISAL Delacruz-C     • hydrocortisone   Topical 4x Daily PRN FAISAL Delacruz-C     • hydrOXYzine HCL  100 mg Oral Q6H PRN Max 4/day Rock Alves MD      Or   • LORazepam  2 mg Intramuscular Q6H PRN Rock Alves MD     • hydrOXYzine HCL  25 mg Oral Q6H PRN Max 4/day Rock Alves MD     • lidocaine  1 patch Topical Daily Rock Alves MD     • loratadine  10 mg Oral Daily Rock Alves MD     • magnesium hydroxide  30 mL Oral Daily PRN FAISAL Delacruz-C     • methocarbamol  500 mg Oral Q6H PRN Rock Alves MD     • nicotine  1 patch Transdermal Daily Rock Alves MD     • nicotine polacrilex  4 mg Oral Q2H PRN Rock Alves MD     • OLANZapine  10 mg Oral Q3H PRN Max 3/day Rock Alves MD      Or   • OLANZapine  10 mg Intramuscular Q3H PRN Max 3/day Rock Alves MD     • OLANZapine  5 mg Oral Q3H PRN Max 6/day Rock Alves MD      Or   • OLANZapine  5 mg Intramuscular Q3H PRN Max 6/day Rock Alves MD     • OLANZapine  2 5 mg Oral Q3H PRN Max 8/day Rock Alves MD     • pantoprazole  40 mg Oral Early Morning Rock Alves MD     • propranolol  10 mg Oral Q8H PRN Rock Alves MD     • senna-docusate sodium  1 tablet Oral HS Teresa Jose Issa Escobar MD     • senna-docusate sodium  1 tablet Oral Daily PRN Charmayne Andreas, MD     • sorbitol  30 mL Oral Q1H PRN Charmayne Andreas, MD     • SUMAtriptan  50 mg Oral Daily PRN JARED Packer     • traZODone  50 mg Oral HS PRN Charmayne Andreas, MD     • venlafaxine  150 mg Oral Daily Charmayne Andreas, MD         Suicide/Homicide Risk Assessment:  Risk of Harm to Self:   Nursing Suicide Risk Assessment Last 24 hours: C-SSRS Risk (Since Last Contact)  Calculated C-SSRS Risk Score (Since Last Contact): No Risk Indicated  Current Specific Risk Factors include: past serious suicide attempts, chronic suicidal ideation, chronic sever mental illness  Protective Factors: no current suicidal ideation, able to contract for safety on the unit, taking medications as ordered on the unit, ability to adapt to change, improved depressive symptoms, improved psychotic symptoms  Based on today's assessment, Gwen Grimaldo presents the following risk of harm to self: high    Risk of Harm to Others:  Nursing Homicide Risk Assessment: Violence Risk to Others: Denies within past 6 months  Current Specific Risk Factors include: current psychotic symptoms, multiple stressors  Protective Factors: no current homicidal ideation, mood is stabilizing  Based on today's assessment, Gwen Re presents the following risk of harm to others: low    The following interventions are recommended: behavioral checks every 7 minutes, continued hospitalization on locked unit    Behavioral Health Medications: All current active meds have been reviewed  Changes as in plan section above  Risks, benefits and possible side effects of Medications:   Risks, benefits, and possible side effects of medications explained to patient and patient verbalizes understanding  Counseling / Coordination of Care:  Patient's progress discussed with staff in treatment team meeting    Medications, treatment progress and treatment plan reviewed with patient  Jose L Gradyviolet, DO 12/22/22  Psychiatry Resident, PGY-II    This note was completed in part utilizing Dragon dictation Software  Grammatical, translation, syntax errors, random word insertions, spelling mistakes, and incomplete sentences may be an occasional consequence of this system secondary to software limitations with voice recognition, ambient noise, and hardware issues  If you have any questions or concerns about the content, text, or information contained within the body of this dictation, please contact the provider for clarification

## 2022-12-23 ENCOUNTER — ANESTHESIA EVENT (INPATIENT)
Dept: PREOP/PACU | Facility: HOSPITAL | Age: 52
End: 2022-12-23

## 2022-12-23 ENCOUNTER — APPOINTMENT (INPATIENT)
Dept: PREOP/PACU | Facility: HOSPITAL | Age: 52
End: 2022-12-23
Attending: PSYCHIATRY & NEUROLOGY

## 2022-12-23 ENCOUNTER — ANESTHESIA (INPATIENT)
Dept: PREOP/PACU | Facility: HOSPITAL | Age: 52
End: 2022-12-23

## 2022-12-23 LAB
ANION GAP SERPL CALCULATED.3IONS-SCNC: 6 MMOL/L (ref 5–14)
BUN SERPL-MCNC: 10 MG/DL (ref 5–25)
CALCIUM SERPL-MCNC: 9.3 MG/DL (ref 8.4–10.2)
CHLORIDE SERPL-SCNC: 106 MMOL/L (ref 96–108)
CO2 SERPL-SCNC: 27 MMOL/L (ref 21–32)
CREAT SERPL-MCNC: 0.68 MG/DL (ref 0.6–1.2)
GFR SERPL CREATININE-BSD FRML MDRD: 101 ML/MIN/1.73SQ M
GLUCOSE P FAST SERPL-MCNC: 115 MG/DL (ref 70–99)
GLUCOSE SERPL-MCNC: 115 MG/DL (ref 70–99)
POTASSIUM SERPL-SCNC: 4.1 MMOL/L (ref 3.5–5.3)
SODIUM SERPL-SCNC: 139 MMOL/L (ref 135–147)

## 2022-12-23 PROCEDURE — GZB4ZZZ OTHER ELECTROCONVULSIVE THERAPY: ICD-10-PCS | Performed by: PSYCHIATRY & NEUROLOGY

## 2022-12-23 RX ORDER — ALBUTEROL SULFATE 2.5 MG/3ML
2.5 SOLUTION RESPIRATORY (INHALATION) ONCE AS NEEDED
Status: DISCONTINUED | OUTPATIENT
Start: 2022-12-23 | End: 2022-12-28 | Stop reason: HOSPADM

## 2022-12-23 RX ORDER — ONDANSETRON 2 MG/ML
4 INJECTION INTRAMUSCULAR; INTRAVENOUS ONCE AS NEEDED
Status: CANCELLED | OUTPATIENT
Start: 2022-12-23

## 2022-12-23 RX ORDER — ONDANSETRON 2 MG/ML
4 INJECTION INTRAMUSCULAR; INTRAVENOUS ONCE AS NEEDED
Status: DISCONTINUED | OUTPATIENT
Start: 2022-12-23 | End: 2022-12-28 | Stop reason: HOSPADM

## 2022-12-23 RX ORDER — GLYCOPYRROLATE 0.2 MG/ML
INJECTION INTRAMUSCULAR; INTRAVENOUS AS NEEDED
Status: DISCONTINUED | OUTPATIENT
Start: 2022-12-23 | End: 2022-12-23

## 2022-12-23 RX ORDER — SODIUM CHLORIDE 9 MG/ML
100 INJECTION, SOLUTION INTRAVENOUS CONTINUOUS
Status: CANCELLED | OUTPATIENT
Start: 2022-12-23

## 2022-12-23 RX ORDER — LABETALOL HYDROCHLORIDE 5 MG/ML
INJECTION, SOLUTION INTRAVENOUS AS NEEDED
Status: DISCONTINUED | OUTPATIENT
Start: 2022-12-23 | End: 2022-12-23

## 2022-12-23 RX ORDER — ALBUTEROL SULFATE 2.5 MG/3ML
2.5 SOLUTION RESPIRATORY (INHALATION) ONCE AS NEEDED
Status: CANCELLED | OUTPATIENT
Start: 2022-12-23

## 2022-12-23 RX ORDER — SODIUM CHLORIDE 9 MG/ML
100 INJECTION, SOLUTION INTRAVENOUS CONTINUOUS
Status: DISCONTINUED | OUTPATIENT
Start: 2022-12-23 | End: 2022-12-30

## 2022-12-23 RX ORDER — KETOROLAC TROMETHAMINE 30 MG/ML
INJECTION, SOLUTION INTRAMUSCULAR; INTRAVENOUS AS NEEDED
Status: DISCONTINUED | OUTPATIENT
Start: 2022-12-23 | End: 2022-12-23

## 2022-12-23 RX ORDER — SUCCINYLCHOLINE/SOD CL,ISO/PF 100 MG/5ML
SYRINGE (ML) INTRAVENOUS AS NEEDED
Status: DISCONTINUED | OUTPATIENT
Start: 2022-12-23 | End: 2022-12-23

## 2022-12-23 RX ADMIN — SODIUM CHLORIDE 100 ML/HR: 0.9 INJECTION, SOLUTION INTRAVENOUS at 06:17

## 2022-12-23 RX ADMIN — GLYCOPYRROLATE 1 MG: 1 TABLET ORAL at 10:18

## 2022-12-23 RX ADMIN — Medication 100 MG: at 06:36

## 2022-12-23 RX ADMIN — FENOFIBRATE 145 MG: 145 TABLET, COATED ORAL at 10:17

## 2022-12-23 RX ADMIN — LIDOCAINE 1 PATCH: 50 PATCH CUTANEOUS at 10:22

## 2022-12-23 RX ADMIN — CLOZAPINE 100 MG: 100 TABLET ORAL at 10:17

## 2022-12-23 RX ADMIN — FLUTICASONE PROPIONATE 1 SPRAY: 50 SPRAY, METERED NASAL at 10:17

## 2022-12-23 RX ADMIN — VENLAFAXINE HYDROCHLORIDE 150 MG: 150 CAPSULE, EXTENDED RELEASE ORAL at 10:17

## 2022-12-23 RX ADMIN — SENNOSIDES AND DOCUSATE SODIUM 1 TABLET: 50; 8.6 TABLET ORAL at 15:28

## 2022-12-23 RX ADMIN — LORATADINE 10 MG: 10 TABLET ORAL at 10:18

## 2022-12-23 RX ADMIN — NICOTINE 1 PATCH: 21 PATCH, EXTENDED RELEASE TRANSDERMAL at 10:22

## 2022-12-23 RX ADMIN — SENNOSIDES AND DOCUSATE SODIUM 1 TABLET: 50; 8.6 TABLET ORAL at 21:16

## 2022-12-23 RX ADMIN — CHOLECALCIFEROL TAB 25 MCG (1000 UNIT) 1000 UNITS: 25 TAB at 10:17

## 2022-12-23 RX ADMIN — OLANZAPINE 2.5 MG: 2.5 TABLET, FILM COATED ORAL at 16:09

## 2022-12-23 RX ADMIN — MAGNESIUM HYDROXIDE 30 ML: 400 SUSPENSION ORAL at 16:13

## 2022-12-23 RX ADMIN — DOCUSATE SODIUM 100 MG: 100 CAPSULE, LIQUID FILLED ORAL at 17:53

## 2022-12-23 RX ADMIN — FLUTICASONE PROPIONATE 1 SPRAY: 50 SPRAY, METERED NASAL at 17:53

## 2022-12-23 RX ADMIN — LABETALOL HYDROCHLORIDE 5 MG: 5 INJECTION, SOLUTION INTRAVENOUS at 06:44

## 2022-12-23 RX ADMIN — DOCUSATE SODIUM 100 MG: 100 CAPSULE, LIQUID FILLED ORAL at 10:18

## 2022-12-23 RX ADMIN — CLOZAPINE 100 MG: 100 TABLET ORAL at 15:27

## 2022-12-23 RX ADMIN — CLOZAPINE 250 MG: 25 TABLET ORAL at 21:16

## 2022-12-23 RX ADMIN — CALCIUM CARBONATE (ANTACID) CHEW TAB 500 MG 500 MG: 500 CHEW TAB at 17:08

## 2022-12-23 RX ADMIN — GLYCOPYRROLATE 1 MG: 1 TABLET ORAL at 17:53

## 2022-12-23 RX ADMIN — KETOROLAC TROMETHAMINE 30 MG: 30 INJECTION, SOLUTION INTRAMUSCULAR; INTRAVENOUS at 06:28

## 2022-12-23 RX ADMIN — HYDROXYZINE HYDROCHLORIDE 25 MG: 25 TABLET, FILM COATED ORAL at 16:09

## 2022-12-23 RX ADMIN — METHOHEXITAL SODIUM 100 MG: 500 INJECTION, POWDER, LYOPHILIZED, FOR SOLUTION INTRAMUSCULAR; INTRAVENOUS; RECTAL at 06:35

## 2022-12-23 RX ADMIN — GLYCOPYRROLATE 0.2 MG: 0.2 INJECTION, SOLUTION INTRAMUSCULAR; INTRAVENOUS at 06:28

## 2022-12-23 RX ADMIN — LABETALOL HYDROCHLORIDE 5 MG: 5 INJECTION, SOLUTION INTRAVENOUS at 06:28

## 2022-12-23 RX ADMIN — BENZTROPINE MESYLATE 1 MG: 1 TABLET ORAL at 16:09

## 2022-12-23 NOTE — PROGRESS NOTES
Progress Note - Behavioral Health   Karri Daugherty 47 y o  female MRN: [id number]  Unit/Bed#: U 377-01 Encounter: [id number]    Assessment/Plan   Principal Problem:    Schizoaffective disorder, bipolar type ([facility] [address] )  Active Problems:    LYNN (generalized anxiety disorder)    Hyperlipidemia    Post-traumatic stress disorder, chronic    Mild intermittent asthma without complication    Gastroesophageal reflux disease    Vitamin D deficiency    Dependence on nicotine from cigarettes    Mild neurocognitive disorder    Medical clearance for psychiatric admission    Non-seasonal allergic rhinitis    Chronic midline low back pain without sciatica      Recommended Treatment:   No psychopharmacologic changes necessary at this moment; will continue to assess daily for further optimization  Continue with pharmacotherapy, group therapy, milieu therapy and occupational therapy  Continue to assess for adverse medication side effects  Encourage Estevan Lamb to participate in nonverbal forms of therapy including journaling and art/music therapy  Continue frequent safety checks and vitals per unit protocol  Continue to engage CM/SW to assist with collateral, disposition planning, and the implementation of an individualized, patient-centered plan of care  Continue medical management by medical team   Case discussed with treatment team     Legal Status: 201  ------------------------------------------------------------    Subjective: All documentation including nursing notes, medication history to ensure medication adherence on the unit, labs, and vitals were reviewed  Estevan Santiago was evaluated this morning for continuity of care and no acute distress noted throughout the evaluation  Over the past 24 hours per nursing report, Estevan Santiago has been cooperative on the unit and compliant with medications        Nutrition Assessment and Intervention:       Other interventions: Discussed proper nutrition to support mental health    Physical Activity Assessment and Intervention:      Other interventions: Discussed the mental health benefits of exercise    Emotional and Mental Well-being, Sleep, Connectedness Assessment and Intervention:      Other interventions: Discussed sleep and stress management and their role in mental health    Tobacco and Toxic Substance Assessment and Intervention:     Brief intervention performed for tobacco, alcohol, or drug use    Tobacco cessation counseling provided          Today, Shelli Gutiérrez is consenting for safety on the unit  Shelli Gutiérrez reports feeling "scared when I lose my memory after ECT " Shelli Gutiérrez notes having nightmares in her sleep  Shelli Gutiérrez states having a good appetite  Shelli Gutiérrez has been taking the medications as prescribed and reporting no side effects  She has goals today of contacting her group home about her debit card, paying her rent  Provided psychoeducation around ECT which patient was receptive to and stated that she found helpful with regards to planning about the neurogenesis process and why she has short-term memory side effects  Offered reassurance and validation of her experience with both her mental illness as well as her temporary short-term memory loss  Shelli Gutiérrez denies suicidal ideations  Shelli Gutiérrez denies homicidal ideations  Regarding hallucinations, Shelli Gutiérrez denies any currently during this interview and does not appear to be responding internally  She does admit to hearing voices on a regular basis and earlier this morning, that "bug me" and are negative towards her but states they are no longer command in nature  PRNs overnight: Zyprexa for voices as well as agitation    As needed Atarax for anxiety and trazodone for sleep  VS: Reviewed, Clinically obese, otherwise within normal limits    Progress Toward Goals: slow improvement    Psychiatric Review of Systems:  Behavior over the last 24 hours:  improved  Sleep: nightmares  Appetite: normal  Medication side effects: No   ROS: all other systems are negative    Vital signs in last 24 hours:  Temp:  [97 1 °F (36 2 °C)-98 2 °F (36 8 °C)] 97 8 °F (36 6 °C)  HR:  [] 84  Resp:  [16] 16  BP: (114-147)/(64-88) 128/88    Laboratory results:  I have personally reviewed all pertinent laboratory/tests results    Recent Results (from the past 48 hour(s))   CBC and differential    Collection Time: 12/22/22  6:17 AM   Result Value Ref Range    WBC 7 53 4 31 - 10 16 Thousand/uL    RBC 4 60 3 81 - 5 12 Million/uL    Hemoglobin 12 7 11 5 - 15 4 g/dL    Hematocrit 41 5 34 8 - 46 1 %    MCV 90 82 - 98 fL    MCH 27 6 26 8 - 34 3 pg    MCHC 30 6 (L) 31 4 - 37 4 g/dL    RDW 14 6 11 6 - 15 1 %    MPV 9 2 8 9 - 12 7 fL    Platelets 443 808 - 017 Thousands/uL    nRBC 0 /100 WBCs    Neutrophils Relative 65 43 - 75 %    Immat GRANS % 1 0 - 2 %    Lymphocytes Relative 20 14 - 44 %    Monocytes Relative 8 4 - 12 %    Eosinophils Relative 6 0 - 6 %    Basophils Relative 0 0 - 1 %    Neutrophils Absolute 4 90 1 85 - 7 62 Thousands/µL    Immature Grans Absolute 0 07 0 00 - 0 20 Thousand/uL    Lymphocytes Absolute 1 49 0 60 - 4 47 Thousands/µL    Monocytes Absolute 0 60 0 17 - 1 22 Thousand/µL    Eosinophils Absolute 0 44 0 00 - 0 61 Thousand/µL    Basophils Absolute 0 03 0 00 - 0 10 Thousands/µL   Basic metabolic panel    Collection Time: 12/23/22  5:25 AM   Result Value Ref Range    Sodium 139 135 - 147 mmol/L    Potassium 4 1 3 5 - 5 3 mmol/L    Chloride 106 96 - 108 mmol/L    CO2 27 21 - 32 mmol/L    ANION GAP 6 5 - 14 mmol/L    BUN 10 5 - 25 mg/dL    Creatinine 0 68 0 60 - 1 20 mg/dL    Glucose 115 (H) 70 - 99 mg/dL    Glucose, Fasting 115 (H) 70 - 99 mg/dL    Calcium 9 3 8 4 - 10 2 mg/dL    eGFR 101 ml/min/1 73sq m         Mental Status Evaluation:    Appearance:  casually dressed, dressed appropriately, adequate grooming, looks older than stated age, overweight, Overtly appearing  female, in no apparent acute distress, good eye contact   Behavior:  cooperative   Speech: normal rate and volume, coherent   Mood:  "Scared when I do not remember from ECT"   Affect:  constricted, Anxious   Thought Process:  Less disorganized and previous, continuing to ruminate   Associations: loose associations   Thought Content:  bizarre delusions, racing thoughts, paranoid ideation, negative thoughts, intrusive thoughts, ruminations   Perceptual Disturbances: Continues to endorse auditory hallucinations with negative content/comments, no visual  Does not currently appear to be responding      Risk Potential: Suicidal ideation - None at present  Homicidal ideation - None at present  Potential for aggression - Not at present   Sensorium:  oriented to person, place and time/date   Memory:  recent and remote memory grossly intact   Consciousness:  alert and awake   Attention/Concentration: attention span and concentration are age appropriate   Insight:  limited   Judgment: limited   Gait/Station: in bed   Motor Activity: no abnormal movements       Current Medications:  Current Facility-Administered Medications   Medication Dose Route Frequency Provider Last Rate   • acetaminophen  650 mg Oral Q6H PRN Danay Villela MD     • acetaminophen  650 mg Oral Q4H PRN Danay Villela MD     • acetaminophen  975 mg Oral Q6H PRN Danay Villela MD     • albuterol  2 puff Inhalation Q6H PRN Chidi Castelan MD     • albuterol  2 5 mg Nebulization Once PRN Shruthi Peres MD     • benztropine  1 mg Intramuscular Q4H PRN Max 6/day Danay Villela MD     • benztropine  1 mg Oral Q4H PRN Max 6/day Danay Villela MD     • calcium carbonate  500 mg Oral Daily PRN Davon Davenport DO     • cholecalciferol  1,000 Units Oral Daily Danay Villela MD     • cloZAPine  100 mg Oral After Lunch Chidi Castelan MD     • cloZAPine  100 mg Oral Daily Chidi Castelan MD     • cloZAPine  250 mg Oral HS Chidi Castelan MD     • hydrOXYzine HCL  50 mg Oral Q6H PRN Max 4/day [facility] Maria Luisa Hernandez MD      Or   • diphenhydrAMINE  50 mg Intramuscular Q6H PRN Dena Akers MD     • docusate sodium  100 mg Oral BID Dena Akers MD     • fenofibrate  145 mg Oral Daily Dena Akers MD     • fluticasone  1 spray Each Nare BID Dena Akers MD     • glycerin-hypromellose-  1 drop Both Eyes Q3H PRN Dena Akers MD     • glycopyrrolate  1 mg Oral BID Tanja Dukes MD     • hydrocortisone   Topical 4x Daily PRN FAISAL Velarde-C     • hydrocortisone   Topical 4x Daily PRN RAMIN VelardeC     • hydrOXYzine HCL  100 mg Oral Q6H PRN Max 4/day Dena Akers MD      Or   • LORazepam  2 mg Intramuscular Q6H PRN Dena Akers MD     • hydrOXYzine HCL  25 mg Oral Q6H PRN Max 4/day Dena Akers MD     • lidocaine  1 patch Topical Daily Dena Akers MD     • loratadine  10 mg Oral Daily Dena Akers MD     • magnesium hydroxide  30 mL Oral Daily PRN RAMIN VelardeC     • methocarbamol  500 mg Oral Q6H PRN Dena Akers MD     • nicotine  1 patch Transdermal Daily Dena Akers MD     • nicotine polacrilex  4 mg Oral Q2H PRN Dena Akers MD     • OLANZapine  10 mg Oral Q3H PRN Max 3/day Dena Akers MD      Or   • OLANZapine  10 mg Intramuscular Q3H PRN Max 3/day Dena Akers MD     • OLANZapine  5 mg Oral Q3H PRN Max 6/day Dena Akers MD      Or   • OLANZapine  5 mg Intramuscular Q3H PRN Max 6/day Dena Akers MD     • OLANZapine  2 5 mg Oral Q3H PRN Max 8/day Dena Akers MD     • ondansetron  4 mg Intravenous Once PRN Eddy Burroughs MD     • pantoprazole  40 mg Oral Early Morning Dena Akers MD     • propranolol  10 mg Oral Q8H PRN Dena Akers MD     • senna-docusate sodium  1 tablet Oral HS Dena Akers MD     • senna-docusate sodium  1 tablet Oral Daily PRN Dena Akers MD     • sodium chloride  100 mL/hr Intravenous Continuous Ernst Conti MD Stopped (12/23/22 0701)   • sorbitol  30 mL Oral Q1H PRN Reinaldo Alfaro MD     • SUMAtriptan  50 mg Oral Daily PRN JARED Correa     • traZODone  50 mg Oral HS PRN Reinaldo Alfaro MD     • venlafaxine  150 mg Oral Daily Reinaldo Alfaro MD         Suicide/Homicide Risk Assessment:  Risk of Harm to Self:   Nursing Suicide Risk Assessment Last 24 hours: C-SSRS Risk (Since Last Contact)  Calculated C-SSRS Risk Score (Since Last Contact): No Risk Indicated  Current Specific Risk Factors include: recent suicidal gesture, recent suicidal threats, recent suicidal ideation, persistent suicidal ideation, diagnosis of depression, current psychotic symptoms, presence of hallucinations, hallucinations are command in nature, presence of delusions, presence of paranoid ideation, impaired cognition, poor self care, poor reasoning, poor impulse control, hopelessness, unable to visualize a realistic positive future, feelings of guilt or self blame  Protective Factors: no current suicidal ideation, improved mood, improved psychotic symptoms, being a parent  Based on today's assessment, Terry Gordon presents the following risk of harm to self: high    Risk of Harm to Others:  Nursing Homicide Risk Assessment: Violence Risk to Others: Denies within past 6 months  Current Specific Risk Factors include: impaired cognition, current psychotic symptoms  Protective Factors: no current homicidal ideation, improved mood, compliant with medications on the unit as ordered, compliant with unit milieu, follows staff redirection  Based on today's assessment, Terry Gordon presents the following risk of harm to others: low    The following interventions are recommended: behavioral checks every 7 minutes, continued hospitalization on locked unit    Behavioral Health Medications: All current active meds have been reviewed  Changes as in plan section above    Risks, benefits and possible side effects of Medications:   Risks, benefits, and possible side effects of medications explained to patient and patient verbalizes understanding  Counseling / Coordination of Care:  Patient's progress discussed with staff in treatment team meeting  Medications, treatment progress and treatment plan reviewed with patient  Israel Robin DO 12/23/22  Psychiatry Resident, PGY-II    This note was completed in part utilizing Dragon dictation Software  Grammatical, translation, syntax errors, random word insertions, spelling mistakes, and incomplete sentences may be an occasional consequence of this system secondary to software limitations with voice recognition, ambient noise, and hardware issues  If you have any questions or concerns about the content, text, or information contained within the body of this dictation, please contact the provider for clarification

## 2022-12-23 NOTE — PROGRESS NOTES
12/23/22 1100   Activity/Group Checklist   Group   (recovery group)   Attendance Attended   Attendance Duration (min) 46-60   Interactions Interacted appropriately   Affect/Mood Appropriate   Goals Achieved Discussed self-esteem issues; Discussed coping strategies; Able to listen to others; Able to engage in interactions; Able to reflect/comment on own behavior;Able to self-disclose; Able to recieve feedback

## 2022-12-23 NOTE — CASE MANAGEMENT
Spoke to Zenaida Blanton at [facility] and provided status update - advised of pt's tolerance to ECT session #3 of 12  Informed of pt's reports of hearing hallucinations and current med changes including pending Clozaril level  Discussed pt's request of Act team member visit  Zenaida Blanton noted this Jeanne Martinez will be informed of potential visit from one of the ACT team members  Continue to coordinate
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
## 2022-12-23 NOTE — ANESTHESIA POSTPROCEDURE EVALUATION
Post-Op Assessment Note    CV Status:  Stable  Pain Score: 0    Pain management: adequate     Mental Status:  Alert and awake   Hydration Status:  Euvolemic   PONV Controlled:  Controlled   Airway Patency:  Patent      Post Op Vitals Reviewed: Yes      Staff: CRNA, Anesthesiologist         No notable events documented      BP   123/78   Temp      Pulse  98   Resp   20   SpO2   94

## 2022-12-23 NOTE — PLAN OF CARE
Problem: Alteration in Thoughts and Perception  Goal: Treatment Goal: Gain control of psychotic behaviors/thinking, reduce/eliminate presenting symptoms and demonstrate improved reality functioning upon discharge  Outcome: Progressing     Problem: Ineffective Coping  Goal: Identifies ineffective coping skills  Outcome: Progressing  Goal: Identifies healthy coping skills  Outcome: Progressing  Goal: Demonstrates healthy coping skills  Outcome: Progressing     Problem: Depression  Goal: Treatment Goal: Demonstrate behavioral control of depressive symptoms, verbalize feelings of improved mood/affect, and adopt new coping skills prior to discharge  Outcome: Progressing     Problem: DISCHARGE PLANNING  Goal: Discharge to home or other facility with appropriate resources  Description: INTERVENTIONS:  - Identify barriers to discharge w/patient and caregiver  - Arrange for needed discharge resources and transportation as appropriate  - Identify discharge learning needs (meds, wound care, etc )  - Arrange for interpretive services to assist at discharge as needed  - Refer to Case Management Department for coordinating discharge planning if the patient needs post-hospital services based on physician/advanced practitioner order or complex needs related to functional status, cognitive ability, or social support system  Outcome: Progressing     Problem: Electroconvulsive therapy (ECT)  Goal: Treatment Goal: Demonstrate a reduction of confusion and improved orientation to person, place, time and/or situation upon discharge, according to optimum baseline/ability  Outcome: Progressing  Goal: Verbalizes/displays adequate comfort level or baseline comfort level  Description: Interventions:  - Encourage patient to monitor pain and request assistance  - Assess pain using appropriate pain scale  - Administer analgesics based on type and severity of pain and evaluate response  - Implement non-pharmacological measures as appropriate and evaluate response  - Consider cultural and social influences on pain and pain management  - Notify physician/advanced practitioner if interventions unsuccessful or patient reports new pain  Outcome: Progressing  Goal: Achieves stable or improved neurological status  Description: INTERVENTIONS  - Monitor and report changes in neurological status  - Monitor vital signs such as temperature, blood pressure, glucose, and any other labs ordered   - Initiate measures to prevent increased intracranial pressure  - Monitor for seizure activity and implement precautions if appropriate      Outcome: Progressing  Goal: Achieves maximal functionality and self care  Description: INTERVENTIONS  - Monitor swallowing and airway patency with patient fatigue and changes in neurological status  - Encourage and assist patient to increase activity and self care     - Encourage visually impaired, hearing impaired and aphasic patients to use assistive/communication devices  Outcome: Progressing  Goal: Maintain or return mobility to safest level of function  Description: INTERVENTIONS:  - Assess patient's ability to carry out ADLs; assess patient's baseline for ADL function and identify physical deficits which impact ability to perform ADLs (bathing, care of mouth/teeth, toileting, grooming, dressing, etc )  - Assess/evaluate cause of self-care deficits   - Assess range of motion  - Assess patient's mobility  - Assess patient's need for assistive devices and provide as appropriate  - Encourage maximum independence but intervene and supervise when necessary  - Involve family in performance of ADLs  - Assess for home care needs following discharge   - Consider OT consult to assist with ADL evaluation and planning for discharge  - Provide patient education as appropriate  Outcome: Progressing  Goal: Absence of urinary retention  Description: INTERVENTIONS:  - Assess patient’s ability to void and empty bladder  - Monitor I/O  - Bladder scan as needed  - Discuss with physician/AP medications to alleviate retention as needed  - Discuss catheterization for long term situations as appropriate  Outcome: Progressing  Goal: Minimal or absence of nausea and/or vomiting  Description: INTERVENTIONS:  - Administer IV fluids if ordered to ensure adequate hydration  - Maintain NPO status until nausea and vomiting are resolved  - Nasogastric tube if ordered  - Administer ordered antiemetic medications as needed  - Provide nonpharmacologic comfort measures as appropriate  - Advance diet as tolerated, if ordered  - Consider nutrition services referral to assist patient with adequate nutrition and appropriate food choices  Outcome: Progressing  Goal: Maintains adequate nutritional intake  Description: INTERVENTIONS:  - Monitor percentage of each meal consumed  - Identify factors contributing to decreased intake, treat as appropriate  - Assist with meals as needed  - Monitor I&O, weight, and lab values if indicated  - Obtain nutrition services referral as needed  Outcome: Progressing

## 2022-12-23 NOTE — PLAN OF CARE
Pt often seen on the unit walking in the hallway, making phone calls  Pt is compliant with meds and meals  Encouraged engagement in unit activities and compliance with ECT  Pt denies SI, AVH however reports hearing voices  Continue to monitor         Problem: DISCHARGE PLANNING  Goal: Discharge to home or other facility with appropriate resources  Description: INTERVENTIONS:  - Identify barriers to discharge w/patient and caregiver  - Arrange for needed discharge resources and transportation as appropriate  - Identify discharge learning needs (meds, wound care, etc )  - Arrange for interpretive services to assist at discharge as needed  - Refer to Case Management Department for coordinating discharge planning if the patient needs post-hospital services based on physician/advanced practitioner order or complex needs related to functional status, cognitive ability, or social support system  Outcome: Progressing

## 2022-12-23 NOTE — PROGRESS NOTES
12/23/22 0846   Team Meeting   Meeting Type Daily Rounds   Team Members Present   Team Members Present Physician;Nurse;   Physician Team Member [place]   Nursing Team Member [facility] Management Team Member [place]   Patient/Family Present   Patient Present No   Patient's Family Present No     Pt disorganized, paranoid and isolative to self  Compliant with meds and meal  Tolerating ECT, had #3 of 12 sessions today  Given prn Zyprexa  Continue med management -monitor Clozaril  Continue to monitor  Discharge to be determined

## 2022-12-23 NOTE — PROCEDURES
Procedure Note - ECT  Damion Bolaños 47 y o  female MRN: [id number]    Time out was taken with staff to confirm correct patient and correct procedure to be performed  On interview this morning Orin Chowdary reports that she continues to make progress in the hospital   She reports this morning that she feels "better" and states that she feels "more comfortable in my own skin"  Orin Chowdary reports that her hallucinations are slowly improving  She denies visual or auditory hallucinations this morning and states she last experienced auditory hallucinations yesterday evening that were distressing to her  At the time of the procedure Orin Chowdary denies suicidal ideation, homicidal ideation, visual and auditory hallucinations  She has continued to experience the side effect of headaches and mild memory loss as a result of the ECT procedures  Damion Bolaños agrees to continue and proceed with ECT treatment  Session Number: 003    Diagnosis: Principal Problem:    Schizoaffective disorder, bipolar type ([facility] [address] )  Active Problems:    LYNN (generalized anxiety disorder)    Hyperlipidemia    Post-traumatic stress disorder, chronic    Mild intermittent asthma without complication    Gastroesophageal reflux disease    Vitamin D deficiency    Dependence on nicotine from cigarettes    Mild neurocognitive disorder    Medical clearance for psychiatric admission    Non-seasonal allergic rhinitis    Chronic midline low back pain without sciatica      ECT Type: Inpatient, Acute    Anesthesia: Brevital 100 mg IV    Electrode Placement: bilateral    Energy level: 50%       Media Information  Document Information    Clinical Image - Mobile Device   EEG   2022 07:00   Attached To: Hospital Encounter on 22     Source Information    Fabi Dixon MD   [facility]       Seizure Duration     EEG and EMG printout was reviewed alongside attending psychiatrist Dr Bhavik Conklin      EE seconds  EM seconds    PSI: 84 4%    Results:  · Clinical seizure was satisfactory, Patient tolerated ECT well  · At the time of procedure patient received toradol 30 mg IV for pain and esmolol 50 mg IV and labetalol 10 mg IV for elevated blood pressures  · The patient was seen resting comfortably and in no acute distress following the procedure      Vitals:    12/23/22 0730   BP: 128/88   Pulse: 84   Resp: 16   Temp: 97 8 °F (36 6 °C)   SpO2: 91%        Medication Administration - last 24 hours from 12/22/2022 0813 to 12/23/2022 0813       Date/Time Order Dose Route Action Action by     12/22/2022 0847 EST cholecalciferol (VITAMIN D3) tablet 1,000 Units 1,000 Units Oral Given Lakeisha Bell, YUDY     12/22/2022 1707 EST docusate sodium (COLACE) capsule 100 mg 100 mg Oral Given Navarro Fowler, YUDY     12/22/2022 8303 EST docusate sodium (COLACE) capsule 100 mg 100 mg Oral Given Lakeisha Bell, YUDY     12/22/2022 0847 EST fenofibrate (TRICOR) tablet 145 mg 145 mg Oral Given Lakeisha Bell, YUDY     12/22/2022 2047 EST lidocaine (LIDODERM) 5 % patch 1 patch 1 patch Topical Patch Removed Esvin Hinds, YUDY     12/22/2022 0847 EST lidocaine (LIDODERM) 5 % patch 1 patch 1 patch Topical Medication Applied Lakeisha Bell, YUDY     12/22/2022 1707 EST fluticasone (FLONASE) 50 mcg/act nasal spray 1 spray 1 spray Each Nare Given [employer] Malcolm, RN     12/22/2022 2560 EST fluticasone (FLONASE) 50 mcg/act nasal spray 1 spray 1 spray Each Nare Given Lakeisha Bell, RN     12/22/2022 6852 EST loratadine (CLARITIN) tablet 10 mg 10 mg Oral Given Lakeisha Bell, YUDY     12/23/2022 0809 EST nicotine (NICODERM CQ) 21 mg/24 hr TD 24 hr patch 1 patch 1 patch Transdermal Patch Removed Danay Boyle, YUDY     12/22/2022 0846 EST nicotine (NICODERM CQ) 21 mg/24 hr TD 24 hr patch 1 patch 1 patch Transdermal Medication Applied Lakeisha Bell, YUDY     12/22/2022 0820 EST nicotine (NICODERM CQ) 21 mg/24 hr TD 24 hr patch 1 patch 1 patch Transdermal Patch Removed Lakeisha Bell, RN     12/22/2022 0847 EST pantoprazole (PROTONIX) EC tablet 40 mg 40 mg Oral Given Santo Pellant, RN     12/22/2022 1939 EST senna-docusate sodium (SENOKOT S) 8 6-50 mg per tablet 1 tablet 1 tablet Oral Given Nermin Motawai, RN     12/22/2022 0847 EST venlafaxine (EFFEXOR-XR) 24 hr capsule 150 mg 150 mg Oral Given Santo Pellant, RN     12/22/2022 1018 EST OLANZapine (ZyPREXA) tablet 5 mg 5 mg Oral Given Ivory Lever, RN     12/22/2022 1018 EST OLANZapine (ZyPREXA) IM injection 5 mg -- Intramuscular See Alternative Ivory Lever, RN     12/22/2022 1507 EST OLANZapine (ZyPREXA) tablet 10 mg 10 mg Oral Given Ivory Lever, RN     12/22/2022 1507 EST OLANZapine (ZyPREXA) IM injection 10 mg -- Intramuscular See Alternative Ivory Lever, RN     12/22/2022 2357 EST traZODone (DESYREL) tablet 50 mg 50 mg Oral Given Nermin Motawai, RN     12/22/2022 1019 EST hydrOXYzine HCL (ATARAX) tablet 50 mg 50 mg Oral Given Ivory Lever, RN     12/22/2022 1019 EST diphenhydrAMINE (BENADRYL) injection 50 mg -- Intramuscular See Alternative Ivory Lever, RN     12/22/2022 1709 EST albuterol (PROVENTIL HFA,VENTOLIN HFA) inhaler 2 puff 2 puff Inhalation Given Ivory Lever, RN     12/22/2022 1707 EST glycopyrrolate (ROBINUL) tablet 1 mg 1 mg Oral Given Ivory Lever, RN     12/22/2022 0847 EST glycopyrrolate (ROBINUL) tablet 1 mg 1 mg Oral Given Santo Pellant, RN     12/22/2022 1405 EST cloZAPine (CLOZARIL) tablet 100 mg 100 mg Oral Given Ivory Lever, RN     12/22/2022 7048 EST cloZAPine (CLOZARIL) tablet 100 mg 100 mg Oral Given Santo Pellant, RN     12/22/2022 2104 EST cloZAPine (CLOZARIL) tablet 250 mg 250 mg Oral Given Nermin Motawai, RN     12/23/2022 0701 EST sodium chloride 0 9 % infusion 0 mL/hr Intravenous Stopped Balbir Mcfarland RN     12/23/2022 8111 EST sodium chloride 0 9 % infusion -- Intravenous Continue from Bimal Canchola CRNA     12/23/2022 0617 EST sodium chloride 0 9 % infusion 100 mL/hr Intravenous New Bag Rhett Ramos RN

## 2022-12-23 NOTE — PROGRESS NOTES
Progress Note - [address] [facility],[address] Adonis 47 y o  female MRN: [id number]   Unit/Bed#: [facility] 377-01 Encounter: [id number]    Behavior over the last 24 hours: unchanged  Rudolph Ramon still reports auditory hallucinations with negative comments "I heard comments to my own thoughts, they were not positive"  She also reports vague visual hallucinations of "shadows"  She still feels depressed and still endorses passive suicidal thoughts  Contracts for safety on the inpatient unit  She remains very anxious and states that she worries about her friend who has not been answering phone calls  She received PRN Atarax and Zyprexa yesterday  Compliant with medications  Attends some groups      Sleep: normal  Appetite: normal  Medication side effects: No   ROS: reports abdominal discomfort, denies any shortness of breath or chest pain, all other systems are negative    Mental Status Evaluation:    Appearance:  casually dressed   Behavior:  cooperative   Speech:  tangential, perseverative   Mood:  depressed, anxious   Affect:  blunted   Thought Process:  tangential, perseverative, concrete   Associations: tangential associations   Thought Content:  paranoid ideation   Perceptual Disturbances: auditory hallucinations with negative comments, visual hallucinations of "shadows"   Risk Potential: Suicidal ideation - Yes, without plan, contracts for safety on the unit  Homicidal ideation - None at present  Potential for aggression - No   Sensorium:  oriented to person, place and time/date   Memory:  recent and remote memory grossly intact   Consciousness:  alert and awake   Attention/Concentration: poor concentration and poor attention span   Insight:  impaired   Judgment: impaired   Gait/Station: normal gait/station, normal balance   Motor Activity: no abnormal movements     Vital signs in last 24 hours:    Temp:  [97 5 °F (36 4 °C)-97 6 °F (36 4 °C)] 97 5 °F (36 4 °C)  HR:  [] 98  Resp:  [16] 16  BP: (107-167)/() 107/77    Laboratory results: I have personally reviewed all pertinent laboratory/tests results    Results from the past 24 hours:   No results found for this or any previous visit (from the past 24 hour(s))  Suicide/Homicide Risk Assessment:    Risk of Harm to Self:   Nursing Suicide Risk Assessment Last 24 hours: C-SSRS Risk (Since Last Contact)  Calculated C-SSRS Risk Score (Since Last Contact): No Risk Indicated  Current Specific Risk Factors include: has suicidal ideation without a plan, mental illness diagnosis, current unstable mood, current psychotic symptoms  Protective Factors: ability to communicate with staff on the unit, able to contract for safety on the unit, taking medications as ordered on the unit  Based on today's assessment, Gwen Grimaldo presents the following risk of harm to self: low    Risk of Harm to Others:  Nursing Homicide Risk Assessment: Violence Risk to Others: Denies within past 6 months  Based on today's assessment, Gwen Grimaldo presents the following risk of harm to others: low    The following interventions are recommended: behavioral checks every 7 minutes, continued hospitalization on locked unit    Progress Toward Goals: no significant progress, still anxious, continues to feel depressed, still has suicidal thoughts, still reports psychotic symptoms    Assessment/Plan   Principal Problem:    Schizoaffective disorder, bipolar type ([facility] [address] )  Active Problems:    LYNN (generalized anxiety disorder)    Post-traumatic stress disorder, chronic    Mild neurocognitive disorder    Hyperlipidemia    Mild intermittent asthma without complication    Gastroesophageal reflux disease    Vitamin D deficiency    Dependence on nicotine from cigarettes    Medical clearance for psychiatric admission    Non-seasonal allergic rhinitis    Chronic midline low back pain without sciatica      Recommended Treatment:     Planned medication and treatment changes:     All current active medications have been reviewed  Encourage group therapy, milieu therapy and occupational therapy  Behavioral Health checks every 7 minutes  Continue ECT #4 on 12/28/2022     Continue current medications:    Current Facility-Administered Medications   Medication Dose Route Frequency Provider Last Rate   • acetaminophen  650 mg Oral Q6H PRN Kelvin Cifuentes MD     • acetaminophen  650 mg Oral Q4H PRN Kelvin Cifuentes MD     • acetaminophen  975 mg Oral Q6H PRN Kelvin Cifuentes MD     • albuterol  2 puff Inhalation Q6H PRN Jasper Callaway MD     • albuterol  2 5 mg Nebulization Once PRN Alaina Shrestha MD     • benztropine  1 mg Intramuscular Q4H PRN Max 6/day Kelvin Cifuentes MD     • benztropine  1 mg Oral Q4H PRN Max 6/day Kelvin Cifuentes MD     • calcium carbonate  500 mg Oral Daily PRN Alexsander Davenport DO     • cholecalciferol  1,000 Units Oral Daily Kelvin Cifuentes MD     • cloZAPine  100 mg Oral After Lunch Jasper Callaway MD     • cloZAPine  100 mg Oral Daily Jasper Callaway MD     • cloZAPine  250 mg Oral HS Jasper Callaway MD     • hydrOXYzine HCL  50 mg Oral Q6H PRN Max 4/day Kelvin Cifuentes MD      Or   • diphenhydrAMINE  50 mg Intramuscular Q6H PRN Kelvin Cifuentes MD     • docusate sodium  100 mg Oral BID Kelvin Cifuentes MD     • fenofibrate  145 mg Oral Daily Kelvin Cifuentes MD     • fluticasone  1 spray Each Nare BID Kelvin Cifuentes MD     • glycerin-hypromellose-  1 drop Both Eyes Q3H PRN Kelvin Cifuentes MD     • glycopyrrolate  1 mg Oral BID Jasper Callaway MD     • hydrocortisone   Topical 4x Daily PRN Yfn Laws PA-C     • hydrocortisone   Topical 4x Daily PRN Yfn Laws PA-C     • hydrOXYzine HCL  100 mg Oral Q6H PRN Max 4/day Kelvin Cifuentes MD      Or   • LORazepam  2 mg Intramuscular Q6H PRN Kelvin Cifuentes MD     • hydrOXYzine HCL  25 mg Oral Q6H PRN Max 4/day Kelvin Cifuentes MD     • lidocaine  1 patch Topical Daily Dena Akers MD     • loratadine  10 mg Oral Daily Dena Akers MD     • magnesium hydroxide  30 mL Oral Daily PRN Kathy Pack PA-C     • methocarbamol  500 mg Oral Q6H PRN Dena Akers MD     • nicotine  1 patch Transdermal Daily Dena Akers MD     • nicotine polacrilex  4 mg Oral Q2H PRN Dena Akers MD     • OLANZapine  10 mg Oral Q3H PRN Max 3/day Dena Akers MD      Or   • OLANZapine  10 mg Intramuscular Q3H PRN Max 3/day Dena Akers MD     • OLANZapine  5 mg Oral Q3H PRN Max 6/day Dena Akers MD      Or   • OLANZapine  5 mg Intramuscular Q3H PRN Max 6/day Dena Akers MD     • OLANZapine  2 5 mg Oral Q3H PRN Max 8/day Dena Akers MD     • ondansetron  4 mg Intravenous Once PRN Eddy Burroughs MD     • pantoprazole  40 mg Oral Early Morning Dena Akers MD     • propranolol  10 mg Oral Q8H PRN Dena Akers MD     • senna-docusate sodium  1 tablet Oral HS Dena Akers MD     • senna-docusate sodium  1 tablet Oral Daily PRN Dena Akers MD     • sodium chloride  100 mL/hr Intravenous Continuous Eddy Burroughs MD Stopped (12/23/22 0701)   • sorbitol  30 mL Oral Q1H PRN Dena Akers MD     • SUMAtriptan  50 mg Oral Daily PRN JARED Garza     • traZODone  50 mg Oral HS PRN Dena Akers MD     • venlafaxine  150 mg Oral Daily Dena Akers MD       Risks / Benefits of Treatment:    Risks, benefits, and possible side effects of medications explained to patient  Patient has limited understanding of risks and benefits of treatment at this time, but agrees to take medications as prescribed  Counseling / Coordination of Care:    Patient's progress discussed with staff in treatment team meeting  Medications, treatment progress and treatment plan reviewed with patient      Tanja Dukes MD 12/24/22

## 2022-12-24 RX ORDER — POLYETHYLENE GLYCOL 3350 17 G/17G
17 POWDER, FOR SOLUTION ORAL DAILY
Status: DISCONTINUED | OUTPATIENT
Start: 2022-12-24 | End: 2023-01-11 | Stop reason: HOSPADM

## 2022-12-24 RX ORDER — AMOXICILLIN 250 MG
2 CAPSULE ORAL
Status: DISCONTINUED | OUTPATIENT
Start: 2022-12-24 | End: 2023-01-11 | Stop reason: HOSPADM

## 2022-12-24 RX ADMIN — SENNOSIDES AND DOCUSATE SODIUM 2 TABLET: 50; 8.6 TABLET ORAL at 20:52

## 2022-12-24 RX ADMIN — VENLAFAXINE HYDROCHLORIDE 150 MG: 150 CAPSULE, EXTENDED RELEASE ORAL at 08:15

## 2022-12-24 RX ADMIN — NICOTINE 1 PATCH: 21 PATCH, EXTENDED RELEASE TRANSDERMAL at 10:23

## 2022-12-24 RX ADMIN — LIDOCAINE 1 PATCH: 50 PATCH CUTANEOUS at 10:23

## 2022-12-24 RX ADMIN — POLYETHYLENE GLYCOL 3350 17 G: 17 POWDER, FOR SOLUTION ORAL at 10:24

## 2022-12-24 RX ADMIN — SUMATRIPTAN SUCCINATE 50 MG: 50 TABLET ORAL at 08:33

## 2022-12-24 RX ADMIN — DOCUSATE SODIUM 100 MG: 100 CAPSULE, LIQUID FILLED ORAL at 08:15

## 2022-12-24 RX ADMIN — GLYCOPYRROLATE 1 MG: 1 TABLET ORAL at 08:15

## 2022-12-24 RX ADMIN — CLOZAPINE 100 MG: 100 TABLET ORAL at 08:15

## 2022-12-24 RX ADMIN — HYDROXYZINE HYDROCHLORIDE 100 MG: 50 TABLET ORAL at 15:29

## 2022-12-24 RX ADMIN — HYDROCORTISONE 1 APPLICATION: 25 CREAM TOPICAL at 18:08

## 2022-12-24 RX ADMIN — HYDROCORTISONE 1 APPLICATION: 1 CREAM TOPICAL at 18:08

## 2022-12-24 RX ADMIN — FLUTICASONE PROPIONATE 1 SPRAY: 50 SPRAY, METERED NASAL at 17:56

## 2022-12-24 RX ADMIN — FENOFIBRATE 145 MG: 145 TABLET, COATED ORAL at 08:15

## 2022-12-24 RX ADMIN — CLOZAPINE 100 MG: 100 TABLET ORAL at 12:51

## 2022-12-24 RX ADMIN — LORATADINE 10 MG: 10 TABLET ORAL at 08:15

## 2022-12-24 RX ADMIN — CHOLECALCIFEROL TAB 25 MCG (1000 UNIT) 1000 UNITS: 25 TAB at 08:15

## 2022-12-24 RX ADMIN — METHOCARBAMOL 500 MG: 500 TABLET, FILM COATED ORAL at 15:29

## 2022-12-24 RX ADMIN — PANTOPRAZOLE SODIUM 40 MG: 40 TABLET, DELAYED RELEASE ORAL at 08:15

## 2022-12-24 RX ADMIN — GLYCOPYRROLATE 1 MG: 1 TABLET ORAL at 17:56

## 2022-12-24 RX ADMIN — FLUTICASONE PROPIONATE 1 SPRAY: 50 SPRAY, METERED NASAL at 08:16

## 2022-12-24 RX ADMIN — SUMATRIPTAN SUCCINATE 50 MG: 50 TABLET ORAL at 12:50

## 2022-12-24 RX ADMIN — OLANZAPINE 2.5 MG: 2.5 TABLET, FILM COATED ORAL at 14:03

## 2022-12-24 RX ADMIN — CLOZAPINE 250 MG: 25 TABLET ORAL at 20:53

## 2022-12-24 RX ADMIN — DOCUSATE SODIUM 100 MG: 100 CAPSULE, LIQUID FILLED ORAL at 17:56
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
## 2022-12-24 NOTE — PLAN OF CARE
Problem: Alteration in Thoughts and Perception  Goal: Treatment Goal: Gain control of psychotic behaviors/thinking, reduce/eliminate presenting symptoms and demonstrate improved reality functioning upon discharge  Outcome: Progressing     Problem: Ineffective Coping  Goal: Identifies ineffective coping skills  Outcome: Progressing  Goal: Identifies healthy coping skills  Outcome: Progressing  Goal: Demonstrates healthy coping skills  Outcome: Progressing

## 2022-12-24 NOTE — PROGRESS NOTES
12/23/22 1400   Activity/Group Checklist   Group Other (Comment)  (OPEN STUDIO Art Therapy/Social Group, [employer])   Attendance Attended   Attendance Duration (min) Greater than 60   Interactions Interacted appropriately   Affect/Mood Appropriate   Goals Achieved Able to listen to others; Able to engage in interactions

## 2022-12-24 NOTE — PROGRESS NOTES
Progress Note - Behavioral Health     Sheryle Foster 47 y o  female MRN: [id number]   Unit/Bed#: [facility] 377-01 Encounter: [id number]    Behavior over the last 24 hours: regressed  Akash Dawson is still disorganized and paranoid  She also still reports auditory hallucinations of "people talking" and visual hallucinations of "visions" I have been hallucinating since I woke up  I have unclear thoughts, it is like delusional type of hallucinations  I know something is going on"  She seems distressed and anxious  Compliant with medications  Attends some groups      Sleep: normal  Appetite: normal  Medication side effects: No   ROS: reports headache, denies any shortness of breath or chest pain, all other systems are negative    Mental Status Evaluation:    Appearance:  wearing a robe   Behavior:  cooperative   Speech:  tangential, perseverative   Mood:  depressed, anxious   Affect:  blunted   Thought Process:  tangential, perseverative, concrete   Associations: tangential associations   Thought Content:  persecutory delusions   Perceptual Disturbances: auditory hallucinations of "people talking", visual hallucinations of "visions", appears preoccupied   Risk Potential: Suicidal ideation - None at present  Homicidal ideation - None  Potential for aggression - No   Sensorium:  oriented to person, place and time/date   Memory:  recent and remote memory grossly intact   Consciousness:  alert and awake   Attention/Concentration: poor concentration and poor attention span   Insight:  impaired   Judgment: impaired   Gait/Station: normal gait/station, normal balance   Motor Activity: no abnormal movements     Vital signs in last 24 hours:    Temp:  [97 6 °F (36 4 °C)] 97 6 °F (36 4 °C)  HR:  [100] 100  Resp:  [16] 16  BP: (116-121)/(79-84) 121/84    Laboratory results: I have personally reviewed all pertinent laboratory/tests results    Results from the past 24 hours: No results found for this or any previous visit (from the past 24 hour(s))  Suicide/Homicide Risk Assessment:    Risk of Harm to Self:   Nursing Suicide Risk Assessment Last 24 hours: C-SSRS Risk (Since Last Contact)  Calculated C-SSRS Risk Score (Since Last Contact): No Risk Indicated  Current Specific Risk Factors include: mental illness diagnosis, current anxiety symptoms, current psychotic symptoms, poor reasoning  Protective Factors: no current suicidal ideation, ability to communicate with staff on the unit, taking medications as ordered on the unit  Based on today's assessment, Shelli Gutiérrez presents the following risk of harm to self: low    Risk of Harm to Others:  Nursing Homicide Risk Assessment: Violence Risk to Others: Denies within past 6 months  Based on today's assessment, Shelli Gutiérrez presents the following risk of harm to others: low    The following interventions are recommended: behavioral checks every 7 minutes, continued hospitalization on locked unit    Progress Toward Goals: no significant improvement, still anxious, remains disorganized, not suicidal today, still reports psychotic symptoms    Assessment/Plan   Principal Problem:    Schizoaffective disorder, bipolar type ([facility] [address] )  Active Problems:    LYNN (generalized anxiety disorder)    Post-traumatic stress disorder, chronic    Mild neurocognitive disorder    Hyperlipidemia    Mild intermittent asthma without complication    Gastroesophageal reflux disease    Vitamin D deficiency    Dependence on nicotine from cigarettes    Medical clearance for psychiatric admission    Non-seasonal allergic rhinitis    Chronic midline low back pain without sciatica      Recommended Treatment:     Planned medication and treatment changes:     All current active medications have been reviewed  Encourage group therapy, milieu therapy and occupational therapy  Behavioral Health checks every 7 minutes  Continue ECT #4 on 12/28/2022   Await Clozaril level results before any further adjustment in Clozaril dose    Continue current medications:    Current Facility-Administered Medications   Medication Dose Route Frequency Provider Last Rate   • acetaminophen  650 mg Oral Q6H PRN Karol Lee MD     • acetaminophen  650 mg Oral Q4H PRN Karol Lee MD     • acetaminophen  975 mg Oral Q6H PRN Karol Lee MD     • albuterol  2 puff Inhalation Q6H PRN Rosi Samayoa MD     • albuterol  2 5 mg Nebulization Once PRN Anita Morales MD     • benztropine  1 mg Intramuscular Q4H PRN Max 6/day Karol Lee MD     • benztropine  1 mg Oral Q4H PRN Max 6/day Karol Lee MD     • calcium carbonate  500 mg Oral Daily PRN Vincent Davenport DO     • cholecalciferol  1,000 Units Oral Daily Karol Lee MD     • cloZAPine  100 mg Oral After Lunch Rosi Samayoa MD     • cloZAPine  100 mg Oral Daily Rosi Samayoa MD     • cloZAPine  250 mg Oral HS Rosi Samayoa MD     • hydrOXYzine HCL  50 mg Oral Q6H PRN Max 4/day Karol Lee MD      Or   • diphenhydrAMINE  50 mg Intramuscular Q6H PRN Karol Lee MD     • docusate sodium  100 mg Oral BID Karol Lee MD     • fenofibrate  145 mg Oral Daily Karol Lee MD     • fluticasone  1 spray Each Nare BID Karol Lee MD     • glycerin-hypromellose-  1 drop Both Eyes Q3H PRN Karol Lee MD     • glycopyrrolate  1 mg Oral BID Rosi Samayoa MD     • hydrocortisone   Topical 4x Daily PRN Maria Ines Magdalene, PA-C     • hydrocortisone   Topical 4x Daily PRN Maria Ines Magdalene, PA-C     • hydrOXYzine HCL  100 mg Oral Q6H PRN Max 4/day Karol Lee MD      Or   • LORazepam  2 mg Intramuscular Q6H PRN Karol Lee MD     • hydrOXYzine HCL  25 mg Oral Q6H PRN Max 4/day Karol Lee MD     • lidocaine  1 patch Topical Daily Karol Lee MD     • loratadine  10 mg Oral Daily Karol Lee MD     • magnesium hydroxide  30 mL Oral Daily PRN Alissa Noble PA-C     • methocarbamol  500 mg Oral Q6H PRN Silver Singer MD     • nicotine  1 patch Transdermal Daily Silver Singer MD     • nicotine polacrilex  4 mg Oral Q2H PRN Silver Singer MD     • OLANZapine  10 mg Oral Q3H PRN Max 3/day Silver Singer MD      Or   • OLANZapine  10 mg Intramuscular Q3H PRN Max 3/day Silver Singer MD     • OLANZapine  5 mg Oral Q3H PRN Max 6/day Silver Singer MD      Or   • OLANZapine  5 mg Intramuscular Q3H PRN Max 6/day Silver Singer MD     • OLANZapine  2 5 mg Oral Q3H PRN Max 8/day Silver Singer MD     • ondansetron  4 mg Intravenous Once PRN Fausto Rios MD     • pantoprazole  40 mg Oral Early Morning Silver Singer MD     • polyethylene glycol  17 g Oral Daily Theo Hughes MD     • propranolol  10 mg Oral Q8H PRN Silver Singer MD     • senna-docusate sodium  1 tablet Oral Daily PRN Silver Singer MD     • senna-docusate sodium  2 tablet Oral HS Theo Hughes MD     • sodium chloride  100 mL/hr Intravenous Continuous Fausto Rios MD Stopped (12/23/22 0701)   • sorbitol  30 mL Oral Q1H PRN Silver Singer MD     • SUMAtriptan  50 mg Oral Daily PRN JARED Garza     • traZODone  50 mg Oral HS PRN Silver Singer MD     • venlafaxine  150 mg Oral Daily Silver Singer MD       Risks / Benefits of Treatment:    Risks, benefits, and possible side effects of medications explained to patient  Patient has limited understanding of risks and benefits of treatment at this time, but agrees to take medications as prescribed  Counseling / Coordination of Care:    Patient's progress discussed with staff in treatment team meeting  Medications, treatment progress and treatment plan reviewed with patient      Theo Hughes MD 12/25/22

## 2022-12-25 RX ADMIN — HYDROXYZINE HYDROCHLORIDE 100 MG: 50 TABLET ORAL at 11:00

## 2022-12-25 RX ADMIN — LIDOCAINE 1 PATCH: 50 PATCH CUTANEOUS at 08:37

## 2022-12-25 RX ADMIN — OLANZAPINE 5 MG: 5 TABLET, FILM COATED ORAL at 09:25

## 2022-12-25 RX ADMIN — FLUTICASONE PROPIONATE 1 SPRAY: 50 SPRAY, METERED NASAL at 17:20

## 2022-12-25 RX ADMIN — MAGNESIUM HYDROXIDE 30 ML: 400 SUSPENSION ORAL at 11:04

## 2022-12-25 RX ADMIN — VENLAFAXINE HYDROCHLORIDE 150 MG: 150 CAPSULE, EXTENDED RELEASE ORAL at 08:37

## 2022-12-25 RX ADMIN — FENOFIBRATE 145 MG: 145 TABLET, COATED ORAL at 08:38

## 2022-12-25 RX ADMIN — CLOZAPINE 100 MG: 100 TABLET ORAL at 13:59

## 2022-12-25 RX ADMIN — FLUTICASONE PROPIONATE 1 SPRAY: 50 SPRAY, METERED NASAL at 08:37

## 2022-12-25 RX ADMIN — CHOLECALCIFEROL TAB 25 MCG (1000 UNIT) 1000 UNITS: 25 TAB at 08:37

## 2022-12-25 RX ADMIN — DOCUSATE SODIUM 100 MG: 100 CAPSULE, LIQUID FILLED ORAL at 17:20

## 2022-12-25 RX ADMIN — PANTOPRAZOLE SODIUM 40 MG: 40 TABLET, DELAYED RELEASE ORAL at 08:38

## 2022-12-25 RX ADMIN — TRAZODONE HYDROCHLORIDE 50 MG: 50 TABLET ORAL at 21:31

## 2022-12-25 RX ADMIN — SUMATRIPTAN SUCCINATE 50 MG: 50 TABLET ORAL at 11:57

## 2022-12-25 RX ADMIN — CLOZAPINE 100 MG: 100 TABLET ORAL at 08:37

## 2022-12-25 RX ADMIN — GLYCOPYRROLATE 1 MG: 1 TABLET ORAL at 17:20

## 2022-12-25 RX ADMIN — DOCUSATE SODIUM 100 MG: 100 CAPSULE, LIQUID FILLED ORAL at 08:38

## 2022-12-25 RX ADMIN — NICOTINE 1 PATCH: 21 PATCH, EXTENDED RELEASE TRANSDERMAL at 08:38

## 2022-12-25 RX ADMIN — SENNOSIDES AND DOCUSATE SODIUM 2 TABLET: 50; 8.6 TABLET ORAL at 21:29

## 2022-12-25 RX ADMIN — OLANZAPINE 10 MG: 10 TABLET, FILM COATED ORAL at 15:35

## 2022-12-25 RX ADMIN — GLYCOPYRROLATE 1 MG: 1 TABLET ORAL at 08:37

## 2022-12-25 RX ADMIN — LORATADINE 10 MG: 10 TABLET ORAL at 08:37

## 2022-12-25 RX ADMIN — CLOZAPINE 250 MG: 25 TABLET ORAL at 21:29

## 2022-12-25 RX ADMIN — POLYETHYLENE GLYCOL 3350 17 G: 17 POWDER, FOR SOLUTION ORAL at 08:37

## 2022-12-26 RX ADMIN — CLOZAPINE 100 MG: 100 TABLET ORAL at 13:19

## 2022-12-26 RX ADMIN — NICOTINE 1 PATCH: 21 PATCH, EXTENDED RELEASE TRANSDERMAL at 08:20

## 2022-12-26 RX ADMIN — LORATADINE 10 MG: 10 TABLET ORAL at 08:20

## 2022-12-26 RX ADMIN — CHOLECALCIFEROL TAB 25 MCG (1000 UNIT) 1000 UNITS: 25 TAB at 08:20

## 2022-12-26 RX ADMIN — MAGNESIUM HYDROXIDE 30 ML: 400 SUSPENSION ORAL at 10:41

## 2022-12-26 RX ADMIN — DOCUSATE SODIUM 100 MG: 100 CAPSULE, LIQUID FILLED ORAL at 17:15

## 2022-12-26 RX ADMIN — FENOFIBRATE 145 MG: 145 TABLET, COATED ORAL at 08:20

## 2022-12-26 RX ADMIN — SENNOSIDES AND DOCUSATE SODIUM 2 TABLET: 50; 8.6 TABLET ORAL at 21:10

## 2022-12-26 RX ADMIN — FLUTICASONE PROPIONATE 1 SPRAY: 50 SPRAY, METERED NASAL at 08:20

## 2022-12-26 RX ADMIN — GLYCOPYRROLATE 1 MG: 1 TABLET ORAL at 08:20

## 2022-12-26 RX ADMIN — HYDROCORTISONE: 25 CREAM TOPICAL at 11:09

## 2022-12-26 RX ADMIN — TRAZODONE HYDROCHLORIDE 50 MG: 50 TABLET ORAL at 22:29

## 2022-12-26 RX ADMIN — CLOZAPINE 250 MG: 25 TABLET ORAL at 21:11

## 2022-12-26 RX ADMIN — PANTOPRAZOLE SODIUM 40 MG: 40 TABLET, DELAYED RELEASE ORAL at 08:20

## 2022-12-26 RX ADMIN — GLYCOPYRROLATE 1 MG: 1 TABLET ORAL at 17:15

## 2022-12-26 RX ADMIN — VENLAFAXINE HYDROCHLORIDE 150 MG: 150 CAPSULE, EXTENDED RELEASE ORAL at 08:20

## 2022-12-26 RX ADMIN — OLANZAPINE 10 MG: 10 TABLET, FILM COATED ORAL at 10:42

## 2022-12-26 RX ADMIN — SUMATRIPTAN SUCCINATE 50 MG: 50 TABLET ORAL at 13:49

## 2022-12-26 RX ADMIN — OLANZAPINE 10 MG: 10 TABLET, FILM COATED ORAL at 15:44

## 2022-12-26 RX ADMIN — CLOZAPINE 100 MG: 100 TABLET ORAL at 08:20

## 2022-12-26 RX ADMIN — FLUTICASONE PROPIONATE 1 SPRAY: 50 SPRAY, METERED NASAL at 17:15

## 2022-12-26 RX ADMIN — LIDOCAINE 1 PATCH: 50 PATCH CUTANEOUS at 08:20

## 2022-12-26 RX ADMIN — DOCUSATE SODIUM 100 MG: 100 CAPSULE, LIQUID FILLED ORAL at 08:20

## 2022-12-26 RX ADMIN — METHOCARBAMOL 500 MG: 500 TABLET, FILM COATED ORAL at 11:48

## 2022-12-26 RX ADMIN — HYDROCORTISONE: 1 CREAM TOPICAL at 11:09

## 2022-12-26 RX ADMIN — POLYETHYLENE GLYCOL 3350 17 G: 17 POWDER, FOR SOLUTION ORAL at 08:20

## 2022-12-26 NOTE — PROGRESS NOTES
Progress Note - Behavioral Health   Justin Li 47 y o  female MRN: [id number]  Unit/Bed#: [facility] 377-01 Encounter: [id number]    Assessment/Plan   Principal Problem:    Schizoaffective disorder, bipolar type ([facility] [address] )  Active Problems:    LYNN (generalized anxiety disorder)    Hyperlipidemia    Post-traumatic stress disorder, chronic    Mild intermittent asthma without complication    Gastroesophageal reflux disease    Vitamin D deficiency    Dependence on nicotine from cigarettes    Mild neurocognitive disorder    Medical clearance for psychiatric admission    Non-seasonal allergic rhinitis    Chronic midline low back pain without sciatica      Behavior over the last 24 hours:  unchanged  Sleep: normal  Appetite: normal  Medication side effects: No  ROS: reporting A/V hallucinations    Mental Status Evaluation:  Appearance:  age appropriate and disheveled   Behavior:  cooperative   Speech:  normal pitch and normal volume   Mood:  anxious and constricted   Affect:  constricted   Thought Process:  perserverative and tangential   Associations: tangential associations   Thought Content:  delusions  persecutory   Perceptual Disturbances: Auditory hallucinations without commands   Risk Potential: Suicidal Ideations none  Homicidal Ideations none  Potential for Aggression No   Sensorium:  person, place and time/date   Memory:  recent and remote memory grossly intact   Consciousness:  alert and awake    Attention: attention span appeared shorter than expected for age   Insight:  limited   Judgment: limited   Gait/Station: normal gait/station and normal balance   Motor Activity: no abnormal movements     Progress Toward Goals: Per Cohen remains compliant with her medications and denies side effects  According to staff report she  is still disorganized and paranoid  She continues to report  auditory hallucinations  and visual hallucinations   She stated that taking PRN Zyprexa usually helps but she can't remember when she last took a prn dose  She appears anxious  She stated she is hoping Clozaril will eventually help her symptoms and that way she will no longer need prn Zyprexa  Continue Clozaril taper as schedule  Clozaril level is still pending  Recommended Treatment: Continue with group therapy, milieu therapy and occupational therapy  All current active medications have been reviewed  Encourage group therapy, milieu therapy and occupational therapy  Behavioral Health checks every 7 minutes  Continue ECT #4 on 12/28/2022   Continue Clozaril     Risks, benefits and possible side effects of Medications:   Risks, benefits, and possible side effects of medications explained to patient and patient verbalizes understanding        Medications:   all current active meds have been reviewed, continue current psychiatric medications and current meds:   Current Facility-Administered Medications   Medication Dose Route Frequency   • acetaminophen (TYLENOL) tablet 650 mg  650 mg Oral Q6H PRN   • acetaminophen (TYLENOL) tablet 650 mg  650 mg Oral Q4H PRN   • acetaminophen (TYLENOL) tablet 975 mg  975 mg Oral Q6H PRN   • albuterol (PROVENTIL HFA,VENTOLIN HFA) inhaler 2 puff  2 puff Inhalation Q6H PRN   • albuterol inhalation solution 2 5 mg  2 5 mg Nebulization Once PRN   • benztropine (COGENTIN) injection 1 mg  1 mg Intramuscular Q4H PRN Max 6/day   • benztropine (COGENTIN) tablet 1 mg  1 mg Oral Q4H PRN Max 6/day   • calcium carbonate (TUMS) chewable tablet 500 mg  500 mg Oral Daily PRN   • cholecalciferol (VITAMIN D3) tablet 1,000 Units  1,000 Units Oral Daily   • cloZAPine (CLOZARIL) tablet 100 mg  100 mg Oral After Lunch   • cloZAPine (CLOZARIL) tablet 100 mg  100 mg Oral Daily   • cloZAPine (CLOZARIL) tablet 250 mg  250 mg Oral HS   • hydrOXYzine HCL (ATARAX) tablet 50 mg  50 mg Oral Q6H PRN Max 4/day    Or   • diphenhydrAMINE (BENADRYL) injection 50 mg  50 mg Intramuscular Q6H PRN   • docusate sodium (COLACE) capsule 100 mg  100 mg Oral BID   • fenofibrate (TRICOR) tablet 145 mg  145 mg Oral Daily   • fluticasone (FLONASE) 50 mcg/act nasal spray 1 spray  1 spray Each Nare BID   • glycerin-hypromellose- (ARTIFICIAL TEARS) ophthalmic solution 1 drop  1 drop Both Eyes Q3H PRN   • glycopyrrolate (ROBINUL) tablet 1 mg  1 mg Oral BID   • hydrocortisone (ANUSOL-HC) 2 5 % rectal cream   Topical 4x Daily PRN   • hydrocortisone 1 % cream   Topical 4x Daily PRN   • hydrOXYzine HCL (ATARAX) tablet 100 mg  100 mg Oral Q6H PRN Max 4/day    Or   • LORazepam (ATIVAN) injection 2 mg  2 mg Intramuscular Q6H PRN   • hydrOXYzine HCL (ATARAX) tablet 25 mg  25 mg Oral Q6H PRN Max 4/day   • lidocaine (LIDODERM) 5 % patch 1 patch  1 patch Topical Daily   • loratadine (CLARITIN) tablet 10 mg  10 mg Oral Daily   • magnesium hydroxide (MILK OF MAGNESIA) oral suspension 30 mL  30 mL Oral Daily PRN   • methocarbamol (ROBAXIN) tablet 500 mg  500 mg Oral Q6H PRN   • nicotine (NICODERM CQ) 21 mg/24 hr TD 24 hr patch 1 patch  1 patch Transdermal Daily   • nicotine polacrilex (NICORETTE) gum 4 mg  4 mg Oral Q2H PRN   • OLANZapine (ZyPREXA) tablet 10 mg  10 mg Oral Q3H PRN Max 3/day    Or   • OLANZapine (ZyPREXA) IM injection 10 mg  10 mg Intramuscular Q3H PRN Max 3/day   • OLANZapine (ZyPREXA) tablet 5 mg  5 mg Oral Q3H PRN Max 6/day    Or   • OLANZapine (ZyPREXA) IM injection 5 mg  5 mg Intramuscular Q3H PRN Max 6/day   • OLANZapine (ZyPREXA) tablet 2 5 mg  2 5 mg Oral Q3H PRN Max 8/day   • ondansetron (ZOFRAN) injection 4 mg  4 mg Intravenous Once PRN   • pantoprazole (PROTONIX) EC tablet 40 mg  40 mg Oral Early Morning   • polyethylene glycol (MIRALAX) packet 17 g  17 g Oral Daily   • propranolol (INDERAL) tablet 10 mg  10 mg Oral Q8H PRN   • senna-docusate sodium (SENOKOT S) 8 6-50 mg per tablet 1 tablet  1 tablet Oral Daily PRN   • senna-docusate sodium (SENOKOT S) 8 6-50 mg per tablet 2 tablet  2 tablet Oral HS   • sodium chloride 0 9 % infusion  100 mL/hr Intravenous Continuous   • sorbitol 70 % solution 30 mL  30 mL Oral Q1H PRN   • SUMAtriptan (IMITREX) tablet 50 mg  50 mg Oral Daily PRN   • traZODone (DESYREL) tablet 50 mg  50 mg Oral HS PRN   • venlafaxine (EFFEXOR-XR) 24 hr capsule 150 mg  150 mg Oral Daily     Labs: I have personally reviewed all pertinent laboratory/tests results  Most Recent Labs:   Lab Results   Component Value Date    WBC 7 53 12/22/2022    RBC 4 60 12/22/2022    HGB 12 7 12/22/2022    HCT 41 5 12/22/2022     12/22/2022    RDW 14 6 12/22/2022    NEUTROABS 4 90 12/22/2022    SODIUM 139 12/23/2022    K 4 1 12/23/2022     12/23/2022    CO2 27 12/23/2022    BUN 10 12/23/2022    CREATININE 0 68 12/23/2022    GLUC 115 (H) 12/23/2022    GLUF 115 (H) 12/23/2022    CALCIUM 9 3 12/23/2022    AST 31 12/15/2022    ALT 30 12/15/2022    ALKPHOS 69 12/15/2022    TP 7 7 12/15/2022    ALB 4 6 12/15/2022    TBILI 0 38 12/15/2022    CHOLESTEROL 200 (H) 12/02/2022    HDL 51 12/02/2022    TRIG 127 12/02/2022    LDLCALC 124 (H) 12/02/2022    NONHDLC 149 12/02/2022    AMMONIA 28 10/27/2017    CKO1YTZPRNHU 2 264 12/02/2022    FREET4 0 89 03/24/2016    PREGSERUM Negative 10/21/2019    HCG <2 00 04/10/2014    RPR Non-Reactive 12/07/2022    HGBA1C 5 6 12/02/2022     12/02/2022       Counseling / Coordination of Care  Total floor / unit time spent today n/a minutes  Greater than 50% of total time was spent with the patient and / or family counseling and / or coordination of care   A description of the counseling / coordination of care:

## 2022-12-27 ENCOUNTER — ANESTHESIA (OUTPATIENT)
Dept: ANESTHESIOLOGY | Facility: HOSPITAL | Age: 52
End: 2022-12-27

## 2022-12-27 ENCOUNTER — ANESTHESIA EVENT (OUTPATIENT)
Dept: ANESTHESIOLOGY | Facility: HOSPITAL | Age: 52
End: 2022-12-27

## 2022-12-27 RX ORDER — HALOPERIDOL 5 MG/ML
2.5 INJECTION INTRAMUSCULAR
Status: DISCONTINUED | OUTPATIENT
Start: 2022-12-27 | End: 2023-01-11 | Stop reason: HOSPADM

## 2022-12-27 RX ORDER — SODIUM CHLORIDE 9 MG/ML
50 INJECTION, SOLUTION INTRAVENOUS CONTINUOUS
Status: CANCELLED | OUTPATIENT
Start: 2022-12-27

## 2022-12-27 RX ORDER — LORAZEPAM 2 MG/ML
2 INJECTION INTRAMUSCULAR
Status: DISCONTINUED | OUTPATIENT
Start: 2022-12-27 | End: 2023-01-11 | Stop reason: HOSPADM

## 2022-12-27 RX ORDER — HALOPERIDOL 5 MG/1
5 TABLET ORAL
Status: DISCONTINUED | OUTPATIENT
Start: 2022-12-27 | End: 2023-01-04

## 2022-12-27 RX ORDER — HALOPERIDOL 5 MG/ML
5 INJECTION INTRAMUSCULAR
Status: DISCONTINUED | OUTPATIENT
Start: 2022-12-27 | End: 2023-01-11 | Stop reason: HOSPADM

## 2022-12-27 RX ORDER — HALOPERIDOL 1 MG/1
2 TABLET ORAL
Status: DISCONTINUED | OUTPATIENT
Start: 2022-12-27 | End: 2023-01-04

## 2022-12-27 RX ORDER — BENZTROPINE MESYLATE 1 MG/ML
0.5 INJECTION INTRAMUSCULAR; INTRAVENOUS
Status: DISCONTINUED | OUTPATIENT
Start: 2022-12-27 | End: 2023-01-11 | Stop reason: HOSPADM

## 2022-12-27 RX ORDER — BENZTROPINE MESYLATE 1 MG/ML
1 INJECTION INTRAMUSCULAR; INTRAVENOUS
Status: DISCONTINUED | OUTPATIENT
Start: 2022-12-27 | End: 2023-01-11 | Stop reason: HOSPADM

## 2022-12-27 RX ORDER — LORAZEPAM 2 MG/ML
1 INJECTION INTRAMUSCULAR
Status: DISCONTINUED | OUTPATIENT
Start: 2022-12-27 | End: 2023-01-11 | Stop reason: HOSPADM

## 2022-12-27 RX ADMIN — POLYETHYLENE GLYCOL 3350 17 G: 17 POWDER, FOR SOLUTION ORAL at 08:32

## 2022-12-27 RX ADMIN — VENLAFAXINE HYDROCHLORIDE 150 MG: 150 CAPSULE, EXTENDED RELEASE ORAL at 08:32

## 2022-12-27 RX ADMIN — DOCUSATE SODIUM 100 MG: 100 CAPSULE, LIQUID FILLED ORAL at 08:31

## 2022-12-27 RX ADMIN — HALOPERIDOL 5 MG: 5 TABLET ORAL at 09:52

## 2022-12-27 RX ADMIN — FLUTICASONE PROPIONATE 1 SPRAY: 50 SPRAY, METERED NASAL at 08:32

## 2022-12-27 RX ADMIN — GLYCOPYRROLATE 1 MG: 1 TABLET ORAL at 17:30

## 2022-12-27 RX ADMIN — SENNOSIDES AND DOCUSATE SODIUM 2 TABLET: 50; 8.6 TABLET ORAL at 20:55

## 2022-12-27 RX ADMIN — TRAZODONE HYDROCHLORIDE 50 MG: 50 TABLET ORAL at 21:03

## 2022-12-27 RX ADMIN — GLYCOPYRROLATE 1 MG: 1 TABLET ORAL at 08:32

## 2022-12-27 RX ADMIN — ALBUTEROL SULFATE 2 PUFF: 90 AEROSOL, METERED RESPIRATORY (INHALATION) at 17:35

## 2022-12-27 RX ADMIN — CLOZAPINE 100 MG: 100 TABLET ORAL at 08:32

## 2022-12-27 RX ADMIN — CLOZAPINE 275 MG: 25 TABLET ORAL at 20:55

## 2022-12-27 RX ADMIN — CHOLECALCIFEROL TAB 25 MCG (1000 UNIT) 1000 UNITS: 25 TAB at 08:32

## 2022-12-27 RX ADMIN — HALOPERIDOL 5 MG: 5 TABLET ORAL at 21:02

## 2022-12-27 RX ADMIN — HALOPERIDOL 5 MG: 5 TABLET ORAL at 14:13

## 2022-12-27 RX ADMIN — LIDOCAINE 1 PATCH: 50 PATCH CUTANEOUS at 08:32

## 2022-12-27 RX ADMIN — NICOTINE 1 PATCH: 21 PATCH, EXTENDED RELEASE TRANSDERMAL at 08:32

## 2022-12-27 RX ADMIN — DOCUSATE SODIUM 100 MG: 100 CAPSULE, LIQUID FILLED ORAL at 17:30

## 2022-12-27 RX ADMIN — LORATADINE 10 MG: 10 TABLET ORAL at 08:32

## 2022-12-27 RX ADMIN — PANTOPRAZOLE SODIUM 40 MG: 40 TABLET, DELAYED RELEASE ORAL at 08:36

## 2022-12-27 RX ADMIN — FENOFIBRATE 145 MG: 145 TABLET, COATED ORAL at 08:31

## 2022-12-27 RX ADMIN — FLUTICASONE PROPIONATE 1 SPRAY: 50 SPRAY, METERED NASAL at 17:34

## 2022-12-27 RX ADMIN — CLOZAPINE 100 MG: 100 TABLET ORAL at 13:18

## 2022-12-27 NOTE — PROGRESS NOTES
12/27/22 1300   Activity/Group Checklist   Group Other (Comment)  (Recovery Group)   Attendance Attended   Attendance Duration (min) 0-15   Interactions Interacted appropriately   Affect/Mood Appropriate;Calm   Goals Achieved Identified feelings; Identified triggers; Discussed coping strategies; Able to listen to others; Able to engage in interactions; Able to reflect/comment on own behavior;Able to manage/cope with feelings; Able to self-disclose; Able to recieve feedback; Able to give feedback to another

## 2022-12-27 NOTE — PROGRESS NOTES
12/16/22 0907   Team Meeting   Meeting Type Daily Rounds   Team Members Present   Team Members Present Physician;Nurse;   Physician Team Member [place]   Social Work Team Member Nancy   Patient/Family Present   Patient Present No   Patient's Family Present No     Patient will begin ECT treatments on Monday  UR reports she has been approved for six treatments inpatient   Patient continues to be depressed and endorses SI

## 2022-12-27 NOTE — PROGRESS NOTES
12/27/22 0847   Team Meeting   Meeting Type Daily Rounds   Team Members Present   Team Members Present Physician;Nurse;   Physician Team Member [place]   Nursing Team Member Dillon Loaiza [facility]    Care Management Team Member Leeanna   Patient/Family Present   Patient Present No   Patient's Family Present No      Pt remains paranoid, delusional, and preoccupation  Compliant with  meds and meals  Pending Clozaril level  Given prn Zyprexa, prn Atarax and prn Trazodone  Continue ECT and med management  Continue to monitor  Discharge to be determined

## 2022-12-27 NOTE — PROGRESS NOTES
12/16/22 1300   Team Meeting   Meeting Type Tx Team Meeting   Initial Conference Date 12/16/22   Next Conference Date 01/16/23   Team Members Present   Team Members Present Physician;Nurse;   Physician Team Member [address] Team Member Jeannette Montiel   Social Work Team Member Nancy   Patient/Family Present   Patient Present Yes   Patient's Family Present No     Treatment plan reviewed with patient  Patient is in agreement with treatment plan

## 2022-12-27 NOTE — CASE MANAGEMENT
Spoke to pt's ACT team member nurse Cole Menchaca [phone]; provided status update and current meds  Discussed about patient's symptoms, Cole Menchaca noted about his recent phone call with patient on Friday  He further noted about patient's baseline paranoia, delusional and being hypnotized  Cole Menchaca noted that pt had has called ACT on call team so often about the above mentioned symptoms including being poisoned by others while at the group home  Cole Menchaca requested continue coordination and update on pt's ECT and meds  Continue to coordinate

## 2022-12-27 NOTE — ANESTHESIA PREPROCEDURE EVALUATION
Procedure:  PRE-OP ONLY    Relevant Problems   CARDIO   (+) Hyperlipidemia      GI/HEPATIC   (+) Gastroesophageal reflux disease      MUSCULOSKELETAL   (+) Chronic midline low back pain without sciatica   (+) Degenerative disc disease, lumbar      NEURO/PSYCH   (+) Chronic midline low back pain without sciatica   (+) LYNN (generalized anxiety disorder)   (+) History of alcohol dependence (HCC)   (+) Other headache syndrome   (+) Post-traumatic stress disorder, chronic      PULMONARY   (+) Emphysema lung (HCC)   (+) Mild intermittent asthma without complication        Physical Exam    Airway    Mallampati score: II  TM Distance: >3 FB  Neck ROM: full     Dental       Cardiovascular  Cardiovascular exam normal    Pulmonary  Pulmonary exam normal     Other Findings        Anesthesia Plan  ASA Score- 3     Anesthesia Type- general with ASA Monitors  Additional Monitors:   Airway Plan:           Plan Factors-Exercise tolerance (METS): >4 METS  Chart reviewed  EKG reviewed  Existing labs reviewed  Patient summary reviewed  Patient is a current smoker  Patient instructed to abstain from smoking on day of procedure  Patient did not smoke on day of surgery  Induction- intravenous  Postoperative Plan-     Informed Consent- Anesthetic plan and risks discussed with patient  I personally reviewed this patient with the CRNA  Discussed and agreed on the Anesthesia Plan with the CRNA               Lab Results   Component Value Date    HGBA1C 5 6 12/02/2022       Lab Results   Component Value Date     06/22/2018    K 4 1 12/23/2022     12/23/2022    CO2 27 12/23/2022    ANIONGAP 14 4 06/22/2018    BUN 10 12/23/2022    CREATININE 0 68 12/23/2022    GLUCOSE 74 08/20/2015    GLUF 115 (H) 12/23/2022    CALCIUM 9 3 12/23/2022    AST 31 12/15/2022    ALT 30 12/15/2022    ALKPHOS 69 12/15/2022    PROT 6 8 06/22/2018    BILITOT 0 2 (L) 06/22/2018    EGFR 101 12/23/2022       Lab Results   Component Value Date    WBC 7 53 12/22/2022    HGB 12 7 12/22/2022    HCT 41 5 12/22/2022    MCV 90 12/22/2022     12/22/2022    Sinus tachycardia  Low voltage QRS  Cannot rule out Inferior infarct (cited on or before 15-DEC-2022)  Abnormal ECG  When compared with ECG of 15-DEC-2022 14:41, (unconfirmed)  No significant change was found  Confirmed by Dez Molina ([id number]) on 12/15/2022 2:06:20 PM    PCP clearance reviewed

## 2022-12-27 NOTE — PROGRESS NOTES
12/27/22 1000   Activity/Group Checklist   Group Other (Comment)  (Structured Journaling)   Attendance Attended   Attendance Duration (min) 16-30   Interactions Interacted appropriately   Affect/Mood Appropriate;Calm   Goals Achieved Able to listen to others; Able to engage in interactions; Able to self-disclose; Able to recieve feedback; Able to give feedback to another

## 2022-12-27 NOTE — PROGRESS NOTES
Progress Note - Behavioral Health   Qi Savage 47 y o  female MRN: [id number]  Unit/Bed#: [facility] 377-01 Encounter: [id number]    Assessment/Plan   Principal Problem:    Schizoaffective disorder, bipolar type ([address] )  Active Problems:    LYNN (generalized anxiety disorder)    Hyperlipidemia    Post-traumatic stress disorder, chronic    Mild intermittent asthma without complication    Gastroesophageal reflux disease    Vitamin D deficiency    Dependence on nicotine from cigarettes    Mild neurocognitive disorder    Medical clearance for psychiatric admission    Non-seasonal allergic rhinitis    Chronic midline low back pain without sciatica      Recommended Treatment:   No psychopharmacologic changes necessary at this moment with the exception of increasing clozaril to 275mg PM dose for psychotic symptoms, transitioning all PRN medications to Haldol as these have more prominent D2 blockage for psychotic symptoms; will continue to assess daily for further optimization  Will consider standing dose of haldol if symptoms persist     Continue with pharmacotherapy, group therapy, milieu therapy and occupational therapy  Continue to assess for adverse medication side effects  Encourage Terry Lamb to participate in nonverbal forms of therapy including journaling and art/music therapy  Continue frequent safety checks and vitals per unit protocol  Continue to engage CM/SW to assist with collateral, disposition planning, and the implementation of an individualized, patient-centered plan of care  Continue medical management by medical team   Case discussed with treatment team     Legal Status: 201  ------------------------------------------------------------    Subjective: All documentation including nursing notes, medication history to ensure medication adherence on the unit, labs, and vitals were reviewed  Terry Gordon was evaluated this morning for continuity of care and no acute distress noted throughout the evaluation   Over the past 24 hours per nursing report, Terry Gordon has been cooperative on the unit and compliant with medications  She has been utilizing more PRN APs and the only help minimally  Patient has been making more delusional statements, stating that staff is giving her "fake medications"  Still able to follow redirection  Today, Terry Gordon is consenting for safety on the unit  Terry Gordon reports feeling "worse " Terry Gordon notes having good sleep  Terry Gordon states having a good appetite  Terry Gordon has been taking the medications as prescribed and reporting no side effects  Patient is visibly anxious, worried she will not get better and is feeling that her delusions are getting worse  We stopped the interview and went to the nursing station so she could receive PRN haldol  Checked with patient 1 hour later and she said it was helpful but remains anxious she will not get better  Offered support and validation  Patient received  Appreciative  Terry Gordon denies suicidal ideations  Terry Gordon denies homicidal ideations  Regarding hallucinations, Terry Gordon endorses dissociative feelings, visual hallucinations of people coming out of the desk in the office at her during the interview, endorsing AH in the morning but not during interview   Appears to be responding at times to internal stimuli    PRNs overnight: Utilized 10 mg in the morning and p m  of Zyprexa yesterday, trazodone last night, sumatriptan for headache yesterday   VS: Reviewed, Clinically obese, otherwise within normal limits    Progress Toward Goals: mood improvement without psychotic symptom improvement    Psychiatric Review of Systems:  Behavior over the last 24 hours:  regressed  Sleep: normal  Appetite: normal  Medication side effects: No   ROS: all other systems are negative    Vital signs in last 24 hours:  Temp:  [97 8 °F (36 6 °C)-98 6 °F (37 °C)] 98 6 °F (37 °C)  HR:  [93-96] 96  Resp:  [16] 16  BP: (119-136)/(71-83) 119/71    Laboratory results:  I have personally reviewed all pertinent laboratory/tests results  No results found for this or any previous visit (from the past 48 hour(s))  Mental Status Evaluation:    Appearance:  dressed appropriately, marginal hygiene, looks stated age, overweight, overtly  female, in no apparent accute distress, good eye contact   Behavior:  cooperative   Speech:  normal rate and volume   Mood:  "worse"   Affect:  constricted, anxious, emotionally distressed at times   Thought Process:  disorganized   Associations: loose associations   Thought Content:  persecutory and bizarre delusions, paranoid ideation   Perceptual Disturbances:  Auditory hallucinations, Visual hallucinations  and Appears to be responding to internal stimuli   Risk Potential: Suicidal ideation - None at present  Homicidal ideation - None at present  Potential for aggression - Not at present   Sensorium:  oriented to person, place and time/date   Memory:  recent and remote memory grossly intact   Consciousness:  alert and awake   Attention/Concentration: attention span and concentration are age appropriate   Insight:  limited   Judgment: limited   Gait/Station: normal gait/station   Motor Activity: no abnormal movements       Current Medications:  Current Facility-Administered Medications   Medication Dose Route Frequency Provider Last Rate   • acetaminophen  650 mg Oral Q6H PRN Leo Gurrola MD     • acetaminophen  650 mg Oral Q4H PRN Leo Gurrola MD     • acetaminophen  975 mg Oral Q6H PRN Leo Gurrola MD     • albuterol  2 puff Inhalation Q6H PRN Malu Kaplan MD     • albuterol  2 5 mg Nebulization Once PRN Isha Alaniz MD     • benztropine  1 mg Intramuscular Q4H PRN Max 6/day Leo Gurrola MD     • benztropine  1 mg Oral Q4H PRN Max 6/day Leo Gurrola MD     • calcium carbonate  500 mg Oral Daily PRN Davida Davenport DO     • cholecalciferol  1,000 Units Oral Daily Leo Gurrola MD     • cloZAPine  100 mg Oral After Lunch Mello Fuller MD     • cloZAPine  100 mg Oral Daily Mello Fuller MD     • cloZAPine  250 mg Oral HS Mello Fuller MD     • hydrOXYzine HCL  50 mg Oral Q6H PRN Max 4/day Sammie Garza MD      Or   • diphenhydrAMINE  50 mg Intramuscular Q6H PRN Sammie Garza MD     • docusate sodium  100 mg Oral BID Sammie Garza MD     • fenofibrate  145 mg Oral Daily Sammie Garza MD     • fluticasone  1 spray Each Nare BID Sammie Garza MD     • glycerin-hypromellose-  1 drop Both Eyes Q3H PRN Sammie Garza MD     • glycopyrrolate  1 mg Oral BID Mello Fuller MD     • hydrocortisone   Topical 4x Daily PRN Rossy Griffith PA-C     • hydrocortisone   Topical 4x Daily PRN Rossy Griffith, PA-C     • hydrOXYzine HCL  100 mg Oral Q6H PRN Max 4/day Sammie Garza MD      Or   • LORazepam  2 mg Intramuscular Q6H PRN Sammie Garza MD     • hydrOXYzine HCL  25 mg Oral Q6H PRN Max 4/day Sammie Garza MD     • lidocaine  1 patch Topical Daily Sammie Garza MD     • loratadine  10 mg Oral Daily Sammie Garza MD     • magnesium hydroxide  30 mL Oral Daily PRN Rossy Griffith PA-C     • methocarbamol  500 mg Oral Q6H PRN Sammie Garza MD     • nicotine  1 patch Transdermal Daily Sammie Garza MD     • nicotine polacrilex  4 mg Oral Q2H PRN Sammie Garza MD     • OLANZapine  10 mg Oral Q3H PRN Max 3/day Sammie Garza MD      Or   • OLANZapine  10 mg Intramuscular Q3H PRN Max 3/day Sammie Garza MD     • OLANZapine  5 mg Oral Q3H PRN Max 6/day Sammie Garza MD      Or   • OLANZapine  5 mg Intramuscular Q3H PRN Max 6/day Sammie Garza MD     • OLANZapine  2 5 mg Oral Q3H PRN Max 8/day Sammie Garza MD     • ondansetron  4 mg Intravenous Once PRN Jarett Mckinney MD     • pantoprazole  40 mg Oral Early Morning Sammie Garza MD     • polyethylene glycol  17 g Oral Daily Adelaida Yanez MD     • propranolol  10 mg Oral Q8H PRN Charmayne Andreas, MD     • senna-docusate sodium  1 tablet Oral Daily PRN Charmayne Andreas, MD     • senna-docusate sodium  2 tablet Oral HS Adelaida Yanez MD     • sodium chloride  100 mL/hr Intravenous Continuous Daja Moses MD Stopped (12/23/22 0701)   • sorbitol  30 mL Oral Q1H PRN Charmayne Andreas, MD     • SUMAtriptan  50 mg Oral Daily PRN JARED Packer     • traZODone  50 mg Oral HS PRN Charmayne Andreas, MD     • venlafaxine  150 mg Oral Daily Charmayne Andreas, MD         Suicide/Homicide Risk Assessment:  Risk of Harm to Self:   Based on today's assessment, Gwen Grimaldo presents the following risk of harm to self: high    Risk of Harm to Others:  Based on today's assessment, Gwen Henrya presents the following risk of harm to others: moderate    The following interventions are recommended: behavioral checks every 7 minutes, continued hospitalization on locked unit    Behavioral Health Medications: All current active meds have been reviewed  Changes as in plan section above  Risks, benefits and possible side effects of Medications:   Risks, benefits, and possible side effects of medications explained to patient and patient verbalizes understanding  Counseling / Coordination of Care:  Patient's progress discussed with staff in treatment team meeting  Medications, treatment progress and treatment plan reviewed with patient  Yvonne Purdy DO 12/27/22  Psychiatry Resident, PGY-II    This note was completed in part utilizing Dragon dictation Software  Grammatical, translation, syntax errors, random word insertions, spelling mistakes, and incomplete sentences may be an occasional consequence of this system secondary to software limitations with voice recognition, ambient noise, and hardware issues   If you have any questions or concerns about the content, text, or information contained within the body of this dictation, please contact the provider for clarification

## 2022-12-27 NOTE — PROGRESS NOTES
12/27/22 1100   Activity/Group Checklist   Group   (reocvery group)   Attendance Attended   Attendance Duration (min) 0-15   Interactions Interacted appropriately   Affect/Mood Blunted/flat   Goals Achieved Able to listen to others     Patient states she was uncomfortable and asked permission to leave

## 2022-12-28 ENCOUNTER — APPOINTMENT (INPATIENT)
Dept: PREOP/PACU | Facility: HOSPITAL | Age: 52
End: 2022-12-28
Attending: PSYCHIATRY & NEUROLOGY

## 2022-12-28 ENCOUNTER — ANESTHESIA (INPATIENT)
Dept: PREOP/PACU | Facility: HOSPITAL | Age: 52
End: 2022-12-28

## 2022-12-28 ENCOUNTER — ANESTHESIA EVENT (INPATIENT)
Dept: PREOP/PACU | Facility: HOSPITAL | Age: 52
End: 2022-12-28

## 2022-12-28 LAB
CLOZAPINE SERPL-MCNC: 874 NG/ML (ref 350–650)
CLOZAPINE+NOR SERPL-MCNC: 1669 NG/ML
NORCLOZAPINE SERPL-MCNC: 795 NG/ML

## 2022-12-28 RX ORDER — LABETALOL HYDROCHLORIDE 5 MG/ML
INJECTION, SOLUTION INTRAVENOUS AS NEEDED
Status: DISCONTINUED | OUTPATIENT
Start: 2022-12-28 | End: 2022-12-28

## 2022-12-28 RX ORDER — KETOROLAC TROMETHAMINE 30 MG/ML
INJECTION, SOLUTION INTRAMUSCULAR; INTRAVENOUS AS NEEDED
Status: DISCONTINUED | OUTPATIENT
Start: 2022-12-28 | End: 2022-12-28

## 2022-12-28 RX ORDER — SODIUM CHLORIDE 9 MG/ML
50 INJECTION, SOLUTION INTRAVENOUS CONTINUOUS
Status: DISCONTINUED | OUTPATIENT
Start: 2022-12-28 | End: 2022-12-30

## 2022-12-28 RX ORDER — GLYCOPYRROLATE 0.2 MG/ML
INJECTION INTRAMUSCULAR; INTRAVENOUS AS NEEDED
Status: DISCONTINUED | OUTPATIENT
Start: 2022-12-28 | End: 2022-12-28

## 2022-12-28 RX ORDER — HALOPERIDOL 5 MG/1
5 TABLET ORAL 2 TIMES DAILY
Status: DISCONTINUED | OUTPATIENT
Start: 2022-12-28 | End: 2022-12-30

## 2022-12-28 RX ORDER — ESMOLOL HYDROCHLORIDE 10 MG/ML
INJECTION INTRAVENOUS AS NEEDED
Status: DISCONTINUED | OUTPATIENT
Start: 2022-12-28 | End: 2022-12-28

## 2022-12-28 RX ORDER — SUCCINYLCHOLINE/SOD CL,ISO/PF 100 MG/5ML
SYRINGE (ML) INTRAVENOUS AS NEEDED
Status: DISCONTINUED | OUTPATIENT
Start: 2022-12-28 | End: 2022-12-28

## 2022-12-28 RX ADMIN — METHOCARBAMOL 500 MG: 500 TABLET, FILM COATED ORAL at 11:35

## 2022-12-28 RX ADMIN — DOCUSATE SODIUM 100 MG: 100 CAPSULE, LIQUID FILLED ORAL at 08:26

## 2022-12-28 RX ADMIN — ACETAMINOPHEN 975 MG: 325 TABLET ORAL at 11:35

## 2022-12-28 RX ADMIN — SENNOSIDES AND DOCUSATE SODIUM 2 TABLET: 50; 8.6 TABLET ORAL at 21:06

## 2022-12-28 RX ADMIN — ESMOLOL HYDROCHLORIDE 50 MG: 100 INJECTION, SOLUTION INTRAVENOUS at 06:17

## 2022-12-28 RX ADMIN — SODIUM CHLORIDE 50 ML/HR: 0.9 INJECTION, SOLUTION INTRAVENOUS at 05:48

## 2022-12-28 RX ADMIN — FLUTICASONE PROPIONATE 1 SPRAY: 50 SPRAY, METERED NASAL at 17:22

## 2022-12-28 RX ADMIN — LIDOCAINE 1 PATCH: 50 PATCH CUTANEOUS at 08:27

## 2022-12-28 RX ADMIN — METHOHEXITAL SODIUM 100 MG: 500 INJECTION, POWDER, LYOPHILIZED, FOR SOLUTION INTRAMUSCULAR; INTRAVENOUS; RECTAL at 06:17

## 2022-12-28 RX ADMIN — GLYCOPYRROLATE 0.2 MG: 0.2 INJECTION, SOLUTION INTRAMUSCULAR; INTRAVENOUS at 06:16

## 2022-12-28 RX ADMIN — GLYCOPYRROLATE 1 MG: 1 TABLET ORAL at 17:22

## 2022-12-28 RX ADMIN — HALOPERIDOL 5 MG: 5 TABLET ORAL at 17:22

## 2022-12-28 RX ADMIN — CALCIUM CARBONATE (ANTACID) CHEW TAB 500 MG 500 MG: 500 CHEW TAB at 16:40

## 2022-12-28 RX ADMIN — KETOROLAC TROMETHAMINE 30 MG: 30 INJECTION, SOLUTION INTRAMUSCULAR; INTRAVENOUS at 06:16

## 2022-12-28 RX ADMIN — NICOTINE 1 PATCH: 21 PATCH, EXTENDED RELEASE TRANSDERMAL at 08:25

## 2022-12-28 RX ADMIN — LABETALOL HYDROCHLORIDE 10 MG: 5 INJECTION, SOLUTION INTRAVENOUS at 06:17

## 2022-12-28 RX ADMIN — CLOZAPINE 100 MG: 100 TABLET ORAL at 14:18

## 2022-12-28 RX ADMIN — Medication 100 MG: at 06:17

## 2022-12-28 RX ADMIN — PANTOPRAZOLE SODIUM 40 MG: 40 TABLET, DELAYED RELEASE ORAL at 08:26

## 2022-12-28 RX ADMIN — GLYCOPYRROLATE 1 MG: 1 TABLET ORAL at 08:26

## 2022-12-28 RX ADMIN — HALOPERIDOL 5 MG: 5 TABLET ORAL at 14:18

## 2022-12-28 RX ADMIN — LORATADINE 10 MG: 10 TABLET ORAL at 08:26

## 2022-12-28 RX ADMIN — CLOZAPINE 100 MG: 100 TABLET ORAL at 08:26

## 2022-12-28 RX ADMIN — HALOPERIDOL 5 MG: 5 TABLET ORAL at 11:32

## 2022-12-28 RX ADMIN — VENLAFAXINE HYDROCHLORIDE 150 MG: 150 CAPSULE, EXTENDED RELEASE ORAL at 08:26

## 2022-12-28 RX ADMIN — DOCUSATE SODIUM 100 MG: 100 CAPSULE, LIQUID FILLED ORAL at 17:22

## 2022-12-28 RX ADMIN — FENOFIBRATE 145 MG: 145 TABLET, COATED ORAL at 08:26

## 2022-12-28 RX ADMIN — FLUTICASONE PROPIONATE 1 SPRAY: 50 SPRAY, METERED NASAL at 08:26

## 2022-12-28 RX ADMIN — CHOLECALCIFEROL TAB 25 MCG (1000 UNIT) 1000 UNITS: 25 TAB at 08:26

## 2022-12-28 RX ADMIN — CLOZAPINE 275 MG: 25 TABLET ORAL at 21:04

## 2022-12-28 RX ADMIN — POLYETHYLENE GLYCOL 3350 17 G: 17 POWDER, FOR SOLUTION ORAL at 08:25

## 2022-12-28 RX ADMIN — SUMATRIPTAN SUCCINATE 50 MG: 50 TABLET ORAL at 11:33

## 2022-12-28 NOTE — PROGRESS NOTES
[facility] Group Note     12/28/22 1000 12/28/22 1300   Activity/Group Checklist   Group Personal control  (Music and Lyrics) Life Skills  ([employer] Feel[employer])   Attendance Attended Attended   Attendance Duration (min) 16-30  (pulled by multiple clinicians) 31-45  (arrived late to group)   Interactions Interacted appropriately Interacted appropriately  (but reserved and guarded at times)   Affect/Mood Appropriate;Calm Appropriate;Calm   Goals Achieved Able to listen to others; Able to engage in interactions; Able to recieve feedback; Able to give feedback to another Displayed empathy;Able to listen to others; Able to engage in interactions; Able to reflect/comment on own behavior;Able to recieve feedback; Able to give feedback to another

## 2022-12-28 NOTE — PROGRESS NOTES
12/27/22 1415   Activity/Group Checklist   Group Other (Comment)  (Group Art Therapy/Psychodynamic, Open Choice with Discussion)   Attendance Attended   Attendance Duration (min) Greater than 60   Interactions Interacted appropriately  (Mild disorganization; struggling with feeling paranoid)   Affect/Mood Appropriate   Goals Achieved Discussed coping strategies; Able to listen to others; Able to engage in interactions; Able to recieve feedback; Able to give feedback to another  (Able to briefly engage materials; full participation with discussion )

## 2022-12-28 NOTE — PROCEDURES
Procedure Note - ECT  Lisa Rowley 47 y o  female MRN: [id number]    Time out was taken with staff to confirm correct patient and correct procedure to be performed  Pt reported her mood is not improving  Agreed to continue ECT  Session Number: 004    Diagnosis: Principal Problem:    Schizoaffective disorder, bipolar type (HCC)  Active Problems:    LYNN (generalized anxiety disorder)    Hyperlipidemia    Post-traumatic stress disorder, chronic    Mild intermittent asthma without complication    Gastroesophageal reflux disease    Vitamin D deficiency    Dependence on nicotine from cigarettes    Mild neurocognitive disorder    Medical clearance for psychiatric admission    Non-seasonal allergic rhinitis    Chronic midline low back pain without sciatica      ECT Type: Inpatient, Acute    Anesthesia: Brevital     Electrode Placement: bilateral    Energy level: 65% increase from 50% because the patient reported lack of clinical response         Seizure Duration     EE sec  EMG: not detected    PSI 66%    Results:Clinical seizure was satisfactory, Patient tolerated ECT well, Complications: none    Vitals:    22 0730   BP: 124/77   Pulse: 100   Resp: 16   Temp: (!) 97 1 °F (36 2 °C)   SpO2: 97%        Medication Administration - last 24 hours from 2022 0737 to 2022 4697       Date/Time Order Dose Route Action Action by     2022 0832 EST cholecalciferol (VITAMIN D3) tablet 1,000 Units 1,000 Units Oral Given Colon Pi, LPN      1802 EST docusate sodium (COLACE) capsule 100 mg 100 mg Oral Given Angela Knutson RN     2022 0831 EST docusate sodium (COLACE) capsule 100 mg 100 mg Oral Given Colon Pi, LPN      3914 EST fenofibrate (TRICOR) tablet 145 mg 145 mg Oral Given Colon Pi, LPN      1689 EST lidocaine (LIDODERM) 5 % patch 1 patch 1 patch Topical Patch Removed Russ Perez RN     2022 0544 EST lidocaine (LIDODERM) 5 % patch 1 patch 1 patch Topical Medication Applied [facility], LPN     21/24/9104 0599 EST fluticasone (FLONASE) 50 mcg/act nasal spray 1 spray 1 spray Each Nare Given Raciel Ella, YUDY     12/27/2022 1125 EST fluticasone (FLONASE) 50 mcg/act nasal spray 1 spray 1 spray Each Nare Given [facility], LPN     45/19/3694 2127 EST loratadine (CLARITIN) tablet 10 mg 10 mg Oral Given [facility], LPN     73/51/0621 2061 EST nicotine (NICODERM CQ) 21 mg/24 hr TD 24 hr patch 1 patch 1 patch Transdermal Medication Applied [facility], LPN     83/90/2519 8373 EST nicotine (NICODERM CQ) 21 mg/24 hr TD 24 hr patch 1 patch 1 patch Transdermal Patch Removed [facility], LPN     22/74/5417 3518 EST pantoprazole (PROTONIX) EC tablet 40 mg 40 mg Oral Given [facility], LPN     45/14/1059 6066 EST venlafaxine (EFFEXOR-XR) 24 hr capsule 150 mg 150 mg Oral Given [facility], LPN     92/39/9825 9808 EST traZODone (DESYREL) tablet 50 mg 50 mg Oral Given Melina Wong RN     12/27/2022 1735 EST albuterol (PROVENTIL HFA,VENTOLIN HFA) inhaler 2 puff 2 puff Inhalation Given Raciel Jaramillo RN     12/27/2022 1730 EST glycopyrrolate (ROBINUL) tablet 1 mg 1 mg Oral Given Raciel Ella, YUDY     12/27/2022 4265 EST glycopyrrolate (ROBINUL) tablet 1 mg 1 mg Oral Given [facility], LPN     82/14/1635 8622 EST cloZAPine (CLOZARIL) tablet 100 mg 100 mg Oral Given Raciel Jaramillo RN     12/27/2022 1207 EST cloZAPine (CLOZARIL) tablet 100 mg 100 mg Oral Given [facility], LPN     26/81/5852 8617 EST polyethylene glycol (MIRALAX) packet 17 g 17 g Oral Given [facility], LPN     97/61/0387 4489 EST senna-docusate sodium (SENOKOT S) 8 6-50 mg per tablet 2 tablet 2 tablet Oral Given Melina Wong RN     12/27/2022 2102 EST haloperidol (HALDOL) tablet 5 mg 5 mg Oral Given Melina Wong RN     12/27/2022 3572 EST haloperidol (HALDOL) tablet 5 mg 5 mg Oral Given Griselda Hawkins RN     12/27/2022 1413 EST haloperidol (HALDOL) tablet 5 mg 5 mg Oral Given Mary Damon Isis Yeager, RN     12/27/2022 2055 EST cloZAPine (CLOZARIL) tablet 275 mg 275 mg Oral Given Carrillo Andino, YUDY     12/28/2022 3992 EST sodium chloride 0 9 % infusion 0 mL/hr Intravenous Stopped Thania Palomino, YUDY     12/28/2022 4287 EST sodium chloride 0 9 % infusion -- Intravenous Continue from [address] Leonardo, CRNA     12/28/2022 2545 EST sodium chloride 0 9 % infusion -- Intravenous Restarted [facility], CRNA     12/28/2022 0419 EST sodium chloride 0 9 % infusion -- Intravenous Paused [facility], CRNA     12/28/2022 3273 EST sodium chloride 0 9 % infusion 50 mL/hr Intravenous New Bag José Miguel Burroughs, RN           Media Information  Document Information    Clinical Image - Mobile Device      12/28/2022 06:48   Attached To:    Hospital Encounter on 12/14/22     Source Information    Justyn Vidales MD   [facility]

## 2022-12-28 NOTE — PROGRESS NOTES
Progress Note - Behavioral Health   Tianna Vásquez 47 y o  female MRN: [id number]  Unit/Bed#: [facility] 377-01 Encounter: [id number]    Assessment/Plan   Principal Problem:    Schizoaffective disorder, bipolar type ([address] )  Active Problems:    LYNN (generalized anxiety disorder)    Hyperlipidemia    Post-traumatic stress disorder, chronic    Mild intermittent asthma without complication    Gastroesophageal reflux disease    Vitamin D deficiency    Dependence on nicotine from cigarettes    Mild neurocognitive disorder    Medical clearance for psychiatric admission    Non-seasonal allergic rhinitis    Chronic midline low back pain without sciatica      Recommended Treatment:   No psychopharmacologic changes necessary at this moment with the exception of scheduling haldol 5mg BID for psychotic symptoms; will continue to assess daily for further optimization  Continue with pharmacotherapy, group therapy, milieu therapy and occupational therapy  Continue to assess for adverse medication side effects  Encourage Shellianderson Lamb to participate in nonverbal forms of therapy including journaling and art/music therapy  Continue frequent safety checks and vitals per unit protocol  Continue to engage CM/SW to assist with collateral, disposition planning, and the implementation of an individualized, patient-centered plan of care  Continue medical management by medical team   Case discussed with treatment team     Legal Status: 201  ------------------------------------------------------------    Subjective: All documentation including nursing notes, medication history to ensure medication adherence on the unit, labs, and vitals were reviewed  Shelli Marla was evaluated this morning for continuity of care and no acute distress noted throughout the evaluation  Over the past 24 hours per nursing report, Shelli Gutiérrez has been cooperative on the unit and compliant with medications  Today, Shelli Gutiérrez is consenting for safety on the unit   Shelli Gutiérrez reports feeling "woried about my memory " Sharda Head notes having good sleep  Sharda Head states having a good appetite  Sharda Morel has been taking the medications as prescribed and reporting no side effects  Patient memory tested by myself today and it is WNL  We discussed again the Why, How and Who of reality testing  Patient receptive  Sharda Head denies suicidal ideations  Sharda Head denies homicidal ideations  Regarding hallucinations, Sharda Head denies currently and appears to be internally stimulated  PRNs overnight: haldol total of 15mg for psychotic symptoms    VS: Reviewed, clinically obese, otherwise within normal limits    Progress Toward Goals: slow improvement    Psychiatric Review of Systems:  Behavior over the last 24 hours:  improved  Sleep: normal  Appetite: normal  Medication side effects: No   ROS: all other systems are negative    Vital signs in last 24 hours:  Temp:  [97 1 °F (36 2 °C)-98 4 °F (36 9 °C)] 97 5 °F (36 4 °C)  HR:  [] 91  Resp:  [12-20] 16  BP: (114-204)/() 130/84    Laboratory results:  I have personally reviewed all pertinent laboratory/tests results  No results found for this or any previous visit (from the past 48 hour(s))        Mental Status Evaluation:    Appearance:  casually dressed, adequate grooming, looks stated age, overweight   Behavior:  cooperative   Speech:  normal rate and volume   Mood:  "worried about my memory"   Affect:  constricted, , anxious   Thought Process:  organized, logical, coherent   Associations: intact associations   Thought Content:  paranoid ideation, ruminations   Perceptual Disturbances: Denies auditory or visual hallucinations but appears internally stimulated   Risk Potential: Suicidal ideation - None at present  Homicidal ideation - None at present  Potential for aggression - Not at present   Sensorium:  oriented to person, place and time/date   Memory:  recent and remote memory grossly intact   Consciousness:  alert and awake   Attention/Concentration: attention span and concentration are age appropriate   Insight:  improving   Judgment: improving   Gait/Station: normal gait/station   Motor Activity: no abnormal movements       Current Medications:  Current Facility-Administered Medications   Medication Dose Route Frequency Provider Last Rate   • acetaminophen  650 mg Oral Q6H PRN Rock Alves MD     • acetaminophen  650 mg Oral Q4H PRN Rock Alves MD     • acetaminophen  975 mg Oral Q6H PRN Rock Alves MD     • albuterol  2 puff Inhalation Q6H PRN Aura Holbrook MD     • haloperidol lactate  2 5 mg Intramuscular Q6H PRN Max 4/day Paul Pour, DO      And   • LORazepam  1 mg Intramuscular Q6H PRN Max 4/day Paul Pour, DO      And   • benztropine  0 5 mg Intramuscular Q6H PRN Max 4/day Paul Pour, DO     • benztropine  1 mg Intramuscular Q4H PRN Max 6/day Rock Alves MD     • haloperidol lactate  5 mg Intramuscular Q4H PRN Max 4/day Paul Pour, DO      And   • LORazepam  2 mg Intramuscular Q4H PRN Max 4/day Paul Pour, DO      And   • benztropine  1 mg Intramuscular Q4H PRN Max 4/day Paul Pour, DO     • benztropine  1 mg Oral Q4H PRN Max 6/day Rock Alves MD     • calcium carbonate  500 mg Oral Daily PRN Khushi Davenport, DO     • cholecalciferol  1,000 Units Oral Daily Rock Alves MD     • cloZAPine  100 mg Oral After Lunch Aura Holborok MD     • cloZAPine  100 mg Oral Daily Aura Holbrook MD     • cloZAPine  275 mg Oral HS Paul Pour, DO     • hydrOXYzine HCL  50 mg Oral Q6H PRN Max 4/day Rock Alves MD      Or   • diphenhydrAMINE  50 mg Intramuscular Q6H PRN Rock Alves MD     • docusate sodium  100 mg Oral BID Rock Alves MD     • fenofibrate  145 mg Oral Daily Rock Alves MD     • fluticasone  1 spray Each Nare BID Rock Alves MD     • glycerin-hypromellose-  1 drop Both Eyes Q3H PRN Rock Alves MD     • glycopyrrolate  1 mg Oral BID Brooke Garcia MD     • haloperidol  2 mg Oral Q4H PRN Max 6/day Adams Curling, DO     • haloperidol  5 mg Oral Q6H PRN Max 4/day Adams Curling, DO     • haloperidol  5 mg Oral Q4H PRN Max 4/day Adams Curling, DO     • hydrocortisone   Topical 4x Daily PRN Belmont Dingmyal, PA-C     • hydrocortisone   Topical 4x Daily PRN Jah Meirl, PA-C     • hydrOXYzine HCL  100 mg Oral Q6H PRN Max 4/day Peng Townsend MD      Or   • LORazepam  2 mg Intramuscular Q6H PRN Peng Townsend MD     • hydrOXYzine HCL  25 mg Oral Q6H PRN Max 4/day Peng Townsend MD     • lidocaine  1 patch Topical Daily Peng Townsend MD     • loratadine  10 mg Oral Daily Peng Townsend MD     • magnesium hydroxide  30 mL Oral Daily PRN Belmont Teetee, PA-C     • methocarbamol  500 mg Oral Q6H PRN Peng Townsend MD     • nicotine  1 patch Transdermal Daily Peng Townsend MD     • nicotine polacrilex  4 mg Oral Q2H PRN Peng Townsend MD     • pantoprazole  40 mg Oral Early Morning Peng Townsend MD     • polyethylene glycol  17 g Oral Daily Brooke Garcia MD     • propranolol  10 mg Oral Q8H PRN Peng Townsend MD     • senna-docusate sodium  1 tablet Oral Daily PRN Peng Townsend MD     • senna-docusate sodium  2 tablet Oral HS Brooke Garcia MD     • sodium chloride  100 mL/hr Intravenous Continuous Leonor Yung MD Stopped (12/23/22 0701)   • sodium chloride  50 mL/hr Intravenous Continuous Tammy Araya MD Stopped (12/28/22 5075)   • sorbitol  30 mL Oral Q1H PRN Peng Townsend MD     • SUMAtriptan  50 mg Oral Daily PRN JARED Duarte     • traZODone  50 mg Oral HS PRN Peng Townsend MD     • venlafaxine  150 mg Oral Daily Peng Townsend MD         Suicide/Homicide Risk Assessment:  Risk of Harm to Self:   Based on today's assessment, Av Zacarias presents the following risk of harm to self: high    Risk of Harm to Others:  Based on today's assessment, Kali Ruano presents the following risk of harm to others: moderate    The following interventions are recommended: behavioral checks every 7 minutes, continued hospitalization on locked unit    Behavioral Health Medications: All current active meds have been reviewed  Changes as in plan section above  Risks, benefits and possible side effects of Medications:   Risks, benefits, and possible side effects of medications explained to patient and patient verbalizes understanding  Counseling / Coordination of Care:  Patient's progress discussed with staff in treatment team meeting  Medications, treatment progress and treatment plan reviewed with patient  Israel Robin DO 12/28/22  Psychiatry Resident, PGY-II    This note was completed in part utilizing Dragon dictation Software  Grammatical, translation, syntax errors, random word insertions, spelling mistakes, and incomplete sentences may be an occasional consequence of this system secondary to software limitations with voice recognition, ambient noise, and hardware issues  If you have any questions or concerns about the content, text, or information contained within the body of this dictation, please contact the provider for clarification

## 2022-12-28 NOTE — ANESTHESIA PREPROCEDURE EVALUATION
Procedure:  ECT INPATIENT    Relevant Problems   CARDIO   (+) Hyperlipidemia      GI/HEPATIC   (+) Gastroesophageal reflux disease      MUSCULOSKELETAL   (+) Chronic midline low back pain without sciatica   (+) Degenerative disc disease, lumbar      NEURO/PSYCH   (+) Chronic midline low back pain without sciatica   (+) LYNN (generalized anxiety disorder)   (+) History of alcohol dependence (HCC)   (+) Other headache syndrome   (+) Post-traumatic stress disorder, chronic      PULMONARY   (+) Emphysema lung (HCC)   (+) Mild intermittent asthma without complication        Physical Exam    Airway    Mallampati score: II  TM Distance: >3 FB  Neck ROM: full     Dental       Cardiovascular  Cardiovascular exam normal    Pulmonary  Pulmonary exam normal     Other Findings        Anesthesia Plan  ASA Score- 3     Anesthesia Type- general with ASA Monitors  Additional Monitors:   Airway Plan:           Plan Factors-Exercise tolerance (METS): >4 METS  Chart reviewed  EKG reviewed  Existing labs reviewed  Patient summary reviewed  Patient is a current smoker  Patient instructed to abstain from smoking on day of procedure  Patient did not smoke on day of surgery  Induction- intravenous  Postoperative Plan-     Informed Consent- Anesthetic plan and risks discussed with patient  I personally reviewed this patient with the CRNA  Discussed and agreed on the Anesthesia Plan with the CRNA               Lab Results   Component Value Date    HGBA1C 5 6 12/02/2022       Lab Results   Component Value Date     06/22/2018    K 4 1 12/23/2022     12/23/2022    CO2 27 12/23/2022    ANIONGAP 14 4 06/22/2018    BUN 10 12/23/2022    CREATININE 0 68 12/23/2022    GLUCOSE 74 08/20/2015    GLUF 115 (H) 12/23/2022    CALCIUM 9 3 12/23/2022    AST 31 12/15/2022    ALT 30 12/15/2022    ALKPHOS 69 12/15/2022    PROT 6 8 06/22/2018    BILITOT 0 2 (L) 06/22/2018    EGFR 101 12/23/2022       Lab Results   Component Value Date    WBC 7 53 12/22/2022    HGB 12 7 12/22/2022    HCT 41 5 12/22/2022    MCV 90 12/22/2022     12/22/2022    Sinus tachycardia  Low voltage QRS  Cannot rule out Inferior infarct (cited on or before 15-DEC-2022)  Abnormal ECG  When compared with ECG of 15-DEC-2022 14:41, (unconfirmed)  No significant change was found  Confirmed by Elliott Neri ([id number]) on 12/15/2022 2:06:20 PM    PCP clearance reviewed

## 2022-12-28 NOTE — PROGRESS NOTES
12/28/22 0855   Team Meeting   Meeting Type Daily Rounds   Team Members Present   Team Members Present Physician;Nurse;   Physician Team Member Thaddeus   Nursing Team Member Hakeem Management Team Member Leeanna   Patient/Family Present   Patient Present No   Patient's Family Present No     Pt remains paranoid, delusional and reports AH  Compliant with meds and meals  Completed 4 of 12 ECT treatments  Given prn Haldol  Clozaril level 873 on 12/23, Continue med management -continue Clozaril  Continue to monitor  Discharge to be determined

## 2022-12-28 NOTE — PLAN OF CARE
Problem: Alteration in Thoughts and Perception  Goal: Treatment Goal: Gain control of psychotic behaviors/thinking, reduce/eliminate presenting symptoms and demonstrate improved reality functioning upon discharge  Outcome: Progressing     Problem: Ineffective Coping  Goal: Identifies ineffective coping skills  Outcome: Progressing  Goal: Identifies healthy coping skills  Outcome: Progressing  Goal: Demonstrates healthy coping skills  Outcome: Progressing     Problem: Depression  Goal: Treatment Goal: Demonstrate behavioral control of depressive symptoms, verbalize feelings of improved mood/affect, and adopt new coping skills prior to discharge  Outcome: Progressing     Problem: Electroconvulsive therapy (ECT)  Goal: Treatment Goal: Demonstrate a reduction of confusion and improved orientation to person, place, time and/or situation upon discharge, according to optimum baseline/ability  Outcome: Progressing  Goal: Verbalizes/displays adequate comfort level or baseline comfort level  Description: Interventions:  - Encourage patient to monitor pain and request assistance  - Assess pain using appropriate pain scale  - Administer analgesics based on type and severity of pain and evaluate response  - Implement non-pharmacological measures as appropriate and evaluate response  - Consider cultural and social influences on pain and pain management  - Notify physician/advanced practitioner if interventions unsuccessful or patient reports new pain  Outcome: Progressing  Goal: Achieves stable or improved neurological status  Description: INTERVENTIONS  - Monitor and report changes in neurological status  - Monitor vital signs such as temperature, blood pressure, glucose, and any other labs ordered   - Initiate measures to prevent increased intracranial pressure  - Monitor for seizure activity and implement precautions if appropriate      Outcome: Progressing  Goal: Achieves maximal functionality and self care  Description: INTERVENTIONS  - Monitor swallowing and airway patency with patient fatigue and changes in neurological status  - Encourage and assist patient to increase activity and self care     - Encourage visually impaired, hearing impaired and aphasic patients to use assistive/communication devices  Outcome: Progressing  Goal: Maintain or return mobility to safest level of function  Description: INTERVENTIONS:  - Assess patient's ability to carry out ADLs; assess patient's baseline for ADL function and identify physical deficits which impact ability to perform ADLs (bathing, care of mouth/teeth, toileting, grooming, dressing, etc )  - Assess/evaluate cause of self-care deficits   - Assess range of motion  - Assess patient's mobility  - Assess patient's need for assistive devices and provide as appropriate  - Encourage maximum independence but intervene and supervise when necessary  - Involve family in performance of ADLs  - Assess for home care needs following discharge   - Consider OT consult to assist with ADL evaluation and planning for discharge  - Provide patient education as appropriate  Outcome: Progressing  Goal: Absence of urinary retention  Description: INTERVENTIONS:  - Assess patient’s ability to void and empty bladder  - Monitor I/O  - Bladder scan as needed  - Discuss with physician/AP medications to alleviate retention as needed  - Discuss catheterization for long term situations as appropriate  Outcome: Progressing  Goal: Minimal or absence of nausea and/or vomiting  Description: INTERVENTIONS:  - Administer IV fluids if ordered to ensure adequate hydration  - Maintain NPO status until nausea and vomiting are resolved  - Nasogastric tube if ordered  - Administer ordered antiemetic medications as needed  - Provide nonpharmacologic comfort measures as appropriate  - Advance diet as tolerated, if ordered  - Consider nutrition services referral to assist patient with adequate nutrition and appropriate food choices  Outcome: Progressing  Goal: Maintains adequate nutritional intake  Description: INTERVENTIONS:  - Monitor percentage of each meal consumed  - Identify factors contributing to decreased intake, treat as appropriate  - Assist with meals as needed  - Monitor I&O, weight, and lab values if indicated  - Obtain nutrition services referral as needed  Outcome: Progressing     Problem: DISCHARGE PLANNING  Goal: Discharge to home or other facility with appropriate resources  Description: INTERVENTIONS:  - Identify barriers to discharge w/patient and caregiver  - Arrange for needed discharge resources and transportation as appropriate  - Identify discharge learning needs (meds, wound care, etc )  - Arrange for interpretive services to assist at discharge as needed  - Refer to Case Management Department for coordinating discharge planning if the patient needs post-hospital services based on physician/advanced practitioner order or complex needs related to functional status, cognitive ability, or social support system  Outcome: Progressing

## 2022-12-28 NOTE — PROGRESS NOTES
12/28/22 1100   Activity/Group Checklist   Group   (recovery group)   Attendance Attended   Attendance Duration (min) 46-60   Interactions Interacted appropriately  (minimal self disclosure)   Affect/Mood Appropriate   Goals Achieved Discussed coping strategies; Discussed self-esteem issues; Able to listen to others; Able to engage in interactions

## 2022-12-29 LAB
BASOPHILS # BLD AUTO: 0.03 THOUSANDS/ÂΜL (ref 0–0.1)
BASOPHILS NFR BLD AUTO: 0 % (ref 0–1)
EOSINOPHIL # BLD AUTO: 0.37 THOUSAND/ÂΜL (ref 0–0.61)
EOSINOPHIL NFR BLD AUTO: 5 % (ref 0–6)
ERYTHROCYTE [DISTWIDTH] IN BLOOD BY AUTOMATED COUNT: 14.2 % (ref 11.6–15.1)
HCT VFR BLD AUTO: 42.9 % (ref 34.8–46.1)
HGB BLD-MCNC: 13.2 G/DL (ref 11.5–15.4)
IMM GRANULOCYTES # BLD AUTO: 0.06 THOUSAND/UL (ref 0–0.2)
IMM GRANULOCYTES NFR BLD AUTO: 1 % (ref 0–2)
LYMPHOCYTES # BLD AUTO: 1.85 THOUSANDS/ÂΜL (ref 0.6–4.47)
LYMPHOCYTES NFR BLD AUTO: 25 % (ref 14–44)
MCH RBC QN AUTO: 27.6 PG (ref 26.8–34.3)
MCHC RBC AUTO-ENTMCNC: 30.8 G/DL (ref 31.4–37.4)
MCV RBC AUTO: 90 FL (ref 82–98)
MONOCYTES # BLD AUTO: 0.54 THOUSAND/ÂΜL (ref 0.17–1.22)
MONOCYTES NFR BLD AUTO: 7 % (ref 4–12)
NEUTROPHILS # BLD AUTO: 4.67 THOUSANDS/ÂΜL (ref 1.85–7.62)
NEUTS SEG NFR BLD AUTO: 62 % (ref 43–75)
NRBC BLD AUTO-RTO: 0 /100 WBCS
PLATELET # BLD AUTO: 285 THOUSANDS/UL (ref 149–390)
PMV BLD AUTO: 9.3 FL (ref 8.9–12.7)
RBC # BLD AUTO: 4.78 MILLION/UL (ref 3.81–5.12)
WBC # BLD AUTO: 7.52 THOUSAND/UL (ref 4.31–10.16)

## 2022-12-29 RX ADMIN — VENLAFAXINE HYDROCHLORIDE 150 MG: 150 CAPSULE, EXTENDED RELEASE ORAL at 08:05

## 2022-12-29 RX ADMIN — CHOLECALCIFEROL TAB 25 MCG (1000 UNIT) 1000 UNITS: 25 TAB at 08:05

## 2022-12-29 RX ADMIN — CLOZAPINE 275 MG: 25 TABLET ORAL at 20:34

## 2022-12-29 RX ADMIN — PANTOPRAZOLE SODIUM 40 MG: 40 TABLET, DELAYED RELEASE ORAL at 08:05

## 2022-12-29 RX ADMIN — CLOZAPINE 100 MG: 100 TABLET ORAL at 13:14

## 2022-12-29 RX ADMIN — HALOPERIDOL 5 MG: 5 TABLET ORAL at 08:05

## 2022-12-29 RX ADMIN — METHOCARBAMOL 500 MG: 500 TABLET, FILM COATED ORAL at 15:00

## 2022-12-29 RX ADMIN — LIDOCAINE 1 PATCH: 50 PATCH CUTANEOUS at 09:03

## 2022-12-29 RX ADMIN — GLYCOPYRROLATE 1 MG: 1 TABLET ORAL at 08:05

## 2022-12-29 RX ADMIN — DOCUSATE SODIUM 100 MG: 100 CAPSULE, LIQUID FILLED ORAL at 08:05

## 2022-12-29 RX ADMIN — HALOPERIDOL 5 MG: 5 TABLET ORAL at 17:28

## 2022-12-29 RX ADMIN — SENNOSIDES AND DOCUSATE SODIUM 2 TABLET: 50; 8.6 TABLET ORAL at 20:35

## 2022-12-29 RX ADMIN — CLOZAPINE 100 MG: 100 TABLET ORAL at 08:05

## 2022-12-29 RX ADMIN — SUMATRIPTAN SUCCINATE 50 MG: 50 TABLET ORAL at 11:23

## 2022-12-29 RX ADMIN — FENOFIBRATE 145 MG: 145 TABLET, COATED ORAL at 08:05

## 2022-12-29 RX ADMIN — FLUTICASONE PROPIONATE 1 SPRAY: 50 SPRAY, METERED NASAL at 08:08

## 2022-12-29 RX ADMIN — LORATADINE 10 MG: 10 TABLET ORAL at 08:05

## 2022-12-29 RX ADMIN — GLYCOPYRROLATE 1 MG: 1 TABLET ORAL at 17:28

## 2022-12-29 RX ADMIN — DOCUSATE SODIUM 100 MG: 100 CAPSULE, LIQUID FILLED ORAL at 17:28

## 2022-12-29 RX ADMIN — HALOPERIDOL 2 MG: 1 TABLET ORAL at 11:23

## 2022-12-29 RX ADMIN — NICOTINE 1 PATCH: 21 PATCH, EXTENDED RELEASE TRANSDERMAL at 09:02

## 2022-12-29 RX ADMIN — POLYETHYLENE GLYCOL 3350 17 G: 17 POWDER, FOR SOLUTION ORAL at 08:06
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
## 2022-12-29 NOTE — PROGRESS NOTES
Progress Note - Behavioral Health   Georgina Padilla 47 y o  female MRN: [id number]  Unit/Bed#: U 377-01 Encounter: [id number]    Assessment/Plan   Principal Problem:    Schizoaffective disorder, bipolar type ([address] )  Active Problems:    LYNN (generalized anxiety disorder)    Hyperlipidemia    Post-traumatic stress disorder, chronic    Mild intermittent asthma without complication    Gastroesophageal reflux disease    Vitamin D deficiency    Dependence on nicotine from cigarettes    Mild neurocognitive disorder    Medical clearance for psychiatric admission    Non-seasonal allergic rhinitis    Chronic midline low back pain without sciatica      Recommended Treatment:   No psychopharmacologic changes necessary at this moment; will continue to assess daily for further optimization  Continue with pharmacotherapy, group therapy, milieu therapy and occupational therapy  Continue to assess for adverse medication side effects  Encourage Shanelle Lamb to participate in nonverbal forms of therapy including journaling and art/music therapy  Continue frequent safety checks and vitals per unit protocol  Continue to engage CM/SW to assist with collateral, disposition planning, and the implementation of an individualized, patient-centered plan of care  Continue medical management by medical team   Case discussed with treatment team     Legal Status: 201  ------------------------------------------------------------    Subjective: All documentation including nursing notes, medication history to ensure medication adherence on the unit, labs, and vitals were reviewed  Shanelle Farnsworth was evaluated this morning for continuity of care and no acute distress noted throughout the evaluation  Over the past 24 hours per nursing report, Shanelle Daja has been cooperative on the unit and compliant with medications  Patient was reporting to staff she remains concerned about the concentration issues she's been experiencing from ECT      Today, Shanelle Daja is consenting for safety on the unit  Melissa Simon reports feeling "ok " Melissa Simon notes having good sleep  Melissa Simon states having a good appetite  Melissa Simon has been taking the medications as prescribed and reporting no side effects  Patient remains concerned about her concentration and her hallucinations  Encouraged to continue doing reality testing and reassured her about her concentration issues  Patient receptive  Melissa Simon denies suicidal ideations  Melissa Simon denies homicidal ideations  Regarding hallucinations, Melissa Simon endorses visual hallucinations currently, denies auditory hallucinations currently, appears to be actively responding to internal stimuli    PRNs overnight: tums for upset stomach, tylenol for pain, haldol for agitation 5mg at 1418, robaxin and imitrex for muscle pain and migraine respectively yesterday  VS: Reviewed, clinically obese, otherwise within normal limits    Progress Toward Goals: slow improvement    Psychiatric Review of Systems:  Behavior over the last 24 hours:  improved  Sleep: normal  Appetite: normal  Medication side effects: No   ROS: all other systems are negative    Vital signs in last 24 hours:  Temp:  [97 2 °F (36 2 °C)-97 7 °F (36 5 °C)] 97 7 °F (36 5 °C)  HR:  [] 102  Resp:  [16] 16  BP: (119-137)/(70-85) 119/75    Laboratory results:  I have personally reviewed all pertinent laboratory/tests results    Recent Results (from the past 48 hour(s))   CBC and differential    Collection Time: 12/29/22  6:11 AM   Result Value Ref Range    WBC 7 52 4 31 - 10 16 Thousand/uL    RBC 4 78 3 81 - 5 12 Million/uL    Hemoglobin 13 2 11 5 - 15 4 g/dL    Hematocrit 42 9 34 8 - 46 1 %    MCV 90 82 - 98 fL    MCH 27 6 26 8 - 34 3 pg    MCHC 30 8 (L) 31 4 - 37 4 g/dL    RDW 14 2 11 6 - 15 1 %    MPV 9 3 8 9 - 12 7 fL    Platelets 938 043 - 820 Thousands/uL    nRBC 0 /100 WBCs    Neutrophils Relative 62 43 - 75 %    Immat GRANS % 1 0 - 2 %    Lymphocytes Relative 25 14 - 44 %    Monocytes Relative 7 4 - 12 %    Eosinophils Relative 5 0 - 6 %    Basophils Relative 0 0 - 1 %    Neutrophils Absolute 4 67 1 85 - 7 62 Thousands/µL    Immature Grans Absolute 0 06 0 00 - 0 20 Thousand/uL    Lymphocytes Absolute 1 85 0 60 - 4 47 Thousands/µL    Monocytes Absolute 0 54 0 17 - 1 22 Thousand/µL    Eosinophils Absolute 0 37 0 00 - 0 61 Thousand/µL    Basophils Absolute 0 03 0 00 - 0 10 Thousands/µL     Clozaril level: 874 12/23/22  Discussed with pharmacy and as patient is not having any side effects, this level is acceptable      Mental Status Evaluation:    Appearance:  casually dressed, adequate grooming, looks older than stated age, overweight, Overtly appearing  female, in no apparent acute distress, good eye contact   Behavior:  pleasant, cooperative   Speech:  normal rate and volume   Mood:  "Okay"   Affect:  constricted, Anxious   Thought Process:  coherent, goal directed   Associations: intact associations   Thought Content:  persecutory, bizarre, somatic, fixed and Paranoid delusions   Perceptual Disturbances: Visual hallucinations , Appears to be responding to internal stimuli and Denies auditory hallucinations   Risk Potential: Suicidal ideation - None at present  Homicidal ideation - None at present  Potential for aggression - Not at present   Sensorium:  oriented to person, place and time/date   Memory:  recent and remote memory grossly intact   Consciousness:  alert and awake   Attention/Concentration: attention span and concentration are age appropriate   Insight:  improving   Judgment: improving   Gait/Station: normal gait/station   Motor Activity: no abnormal movements       Current Medications:  Current Facility-Administered Medications   Medication Dose Route Frequency Provider Last Rate   • acetaminophen  650 mg Oral Q6H PRN Simba Gambino MD     • acetaminophen  650 mg Oral Q4H PRN Simba Gambino MD     • acetaminophen  975 mg Oral Q6H PRN Simba Gambino MD     • albuterol 2 puff Inhalation Q6H PRN Jasper Callaway MD     • haloperidol lactate  2 5 mg Intramuscular Q6H PRN Max 4/day Darlys Alayna, DO      And   • LORazepam  1 mg Intramuscular Q6H PRN Max 4/day Darlys Alayna, DO      And   • benztropine  0 5 mg Intramuscular Q6H PRN Max 4/day Darlys Alayna, DO     • benztropine  1 mg Intramuscular Q4H PRN Max 6/day Kelvin Cifuentes MD     • haloperidol lactate  5 mg Intramuscular Q4H PRN Max 4/day Darlys Alayna, DO      And   • LORazepam  2 mg Intramuscular Q4H PRN Max 4/day Darlys Alayna, DO      And   • benztropine  1 mg Intramuscular Q4H PRN Max 4/day Darlys Alayna, DO     • benztropine  1 mg Oral Q4H PRN Max 6/day Kelvin Cifuentes MD     • calcium carbonate  500 mg Oral Daily PRN Alexsander Davenport DO     • cholecalciferol  1,000 Units Oral Daily Kelvin Cifuentes MD     • cloZAPine  100 mg Oral After Lunch Jasper Callaway MD     • cloZAPine  100 mg Oral Daily Jasper Callaway MD     • cloZAPine  275 mg Oral HS Darlys Alayna, DO     • hydrOXYzine HCL  50 mg Oral Q6H PRN Max 4/day Kelvin Cifuentes MD      Or   • diphenhydrAMINE  50 mg Intramuscular Q6H PRN Kelvin Cifuentes MD     • docusate sodium  100 mg Oral BID Kelvin Cifuentes MD     • fenofibrate  145 mg Oral Daily Kelvin Cifuentes MD     • fluticasone  1 spray Each Nare BID Kelvin Cifuentes MD     • glycerin-hypromellose-  1 drop Both Eyes Q3H PRN Kelvin Cifuentes MD     • glycopyrrolate  1 mg Oral BID Jasper Callaway MD     • haloperidol  2 mg Oral Q4H PRN Max 6/day Darlys Alayna, DO     • haloperidol  5 mg Oral Q6H PRN Max 4/day Darlys Alayna, DO     • haloperidol  5 mg Oral Q4H PRN Max 4/day Darlys Alayna, DO     • haloperidol  5 mg Oral BID Darlys Alayna, DO     • hydrocortisone   Topical 4x Daily PRN CresenciaRAMIN PughC     • hydrocortisone   Topical 4x Daily PRN Cresenciano MARTINE Laws     • hydrOXYzine HCL  100 mg Oral Q6H PRN Max 4/day Rafi Marie Adriane Cook MD      Or   • LORazepam  2 mg Intramuscular Q6H PRN Jody Gerard MD     • hydrOXYzine HCL  25 mg Oral Q6H PRN Max 4/day Jody Gerard MD     • lidocaine  1 patch Topical Daily Jody Gerard MD     • loratadine  10 mg Oral Daily Jody Gerard MD     • magnesium hydroxide  30 mL Oral Daily PRN Saqib Batista PA-C     • methocarbamol  500 mg Oral Q6H PRN Jody Gerard MD     • nicotine  1 patch Transdermal Daily Jody Gerard MD     • nicotine polacrilex  4 mg Oral Q2H PRN Jody Gerard MD     • pantoprazole  40 mg Oral Early Morning Jody Gerard MD     • polyethylene glycol  17 g Oral Daily Ebony Denise MD     • propranolol  10 mg Oral Q8H PRN Jody Gerard MD     • senna-docusate sodium  1 tablet Oral Daily PRN Jody Gerard MD     • senna-docusate sodium  2 tablet Oral HS Ebony Denise MD     • sodium chloride  100 mL/hr Intravenous Continuous Saul Parekh MD Stopped (12/23/22 0701)   • sodium chloride  50 mL/hr Intravenous Continuous Vaishali Figueroa MD Stopped (12/28/22 9664)   • sorbitol  30 mL Oral Q1H PRN Jody Gerard MD     • SUMAtriptan  50 mg Oral Daily PRN JARED Dyson     • traZODone  50 mg Oral HS PRN Jody Gerard MD     • venlafaxine  150 mg Oral Daily Jody Gerard MD         Suicide/Homicide Risk Assessment:  Risk of Harm to Self:   Based on today's assessment, Cathi Bates presents the following risk of harm to self: high    Risk of Harm to Others:  Based on today's assessment, Cathi Bates presents the following risk of harm to others: moderate    The following interventions are recommended: behavioral checks every 7 minutes, continued hospitalization on locked unit    Behavioral Health Medications: All current active meds have been reviewed  Changes as in plan section above    Risks, benefits and possible side effects of Medications:   Risks, benefits, and possible side effects of medications explained to patient and patient verbalizes understanding  Counseling / Coordination of Care:  Patient's progress discussed with staff in treatment team meeting  Medications, treatment progress and treatment plan reviewed with patient  Sirisha Gay DO 12/29/22  Psychiatry Resident, PGY-II    This note was completed in part utilizing Dragon dictation Software  Grammatical, translation, syntax errors, random word insertions, spelling mistakes, and incomplete sentences may be an occasional consequence of this system secondary to software limitations with voice recognition, ambient noise, and hardware issues  If you have any questions or concerns about the content, text, or information contained within the body of this dictation, please contact the provider for clarification

## 2022-12-29 NOTE — PROGRESS NOTES
[facility] Group Note     12/29/22 1000   Activity/Group Checklist   Group Personal control  (Mindfulness Techniques - 5 Senses Grounding and Progressive Muscle Relaxation)   Attendance Attended   Attendance Duration (min) 0-15  (pulled by clinician)   Interactions Interacted appropriately   Affect/Mood Calm   Goals Achieved Able to listen to others; Able to engage in interactions; Able to recieve feedback; Able to give feedback to another  (received resources/benefited from social presence of group)

## 2022-12-29 NOTE — PLAN OF CARE
Problem: Alteration in Thoughts and Perception  Goal: Treatment Goal: Gain control of psychotic behaviors/thinking, reduce/eliminate presenting symptoms and demonstrate improved reality functioning upon discharge  Outcome: Progressing     Problem: Ineffective Coping  Goal: Identifies ineffective coping skills  Outcome: Progressing  Goal: Identifies healthy coping skills  Outcome: Progressing  Goal: Demonstrates healthy coping skills  Outcome: Progressing     Problem: Depression  Goal: Treatment Goal: Demonstrate behavioral control of depressive symptoms, verbalize feelings of improved mood/affect, and adopt new coping skills prior to discharge  Outcome: Progressing     Problem: Electroconvulsive therapy (ECT)  Goal: Treatment Goal: Demonstrate a reduction of confusion and improved orientation to person, place, time and/or situation upon discharge, according to optimum baseline/ability  Outcome: Progressing  Goal: Verbalizes/displays adequate comfort level or baseline comfort level  Description: Interventions:  - Encourage patient to monitor pain and request assistance  - Assess pain using appropriate pain scale  - Administer analgesics based on type and severity of pain and evaluate response  - Implement non-pharmacological measures as appropriate and evaluate response  - Consider cultural and social influences on pain and pain management  - Notify physician/advanced practitioner if interventions unsuccessful or patient reports new pain  Outcome: Progressing  Goal: Achieves stable or improved neurological status  Description: INTERVENTIONS  - Monitor and report changes in neurological status  - Monitor vital signs such as temperature, blood pressure, glucose, and any other labs ordered   - Initiate measures to prevent increased intracranial pressure  - Monitor for seizure activity and implement precautions if appropriate      Outcome: Progressing  Goal: Achieves maximal functionality and self care  Description: INTERVENTIONS  - Monitor swallowing and airway patency with patient fatigue and changes in neurological status  - Encourage and assist patient to increase activity and self care     - Encourage visually impaired, hearing impaired and aphasic patients to use assistive/communication devices  Outcome: Progressing  Goal: Maintain or return mobility to safest level of function  Description: INTERVENTIONS:  - Assess patient's ability to carry out ADLs; assess patient's baseline for ADL function and identify physical deficits which impact ability to perform ADLs (bathing, care of mouth/teeth, toileting, grooming, dressing, etc )  - Assess/evaluate cause of self-care deficits   - Assess range of motion  - Assess patient's mobility  - Assess patient's need for assistive devices and provide as appropriate  - Encourage maximum independence but intervene and supervise when necessary  - Involve family in performance of ADLs  - Assess for home care needs following discharge   - Consider OT consult to assist with ADL evaluation and planning for discharge  - Provide patient education as appropriate  Outcome: Progressing  Goal: Absence of urinary retention  Description: INTERVENTIONS:  - Assess patient’s ability to void and empty bladder  - Monitor I/O  - Bladder scan as needed  - Discuss with physician/AP medications to alleviate retention as needed  - Discuss catheterization for long term situations as appropriate  Outcome: Progressing  Goal: Minimal or absence of nausea and/or vomiting  Description: INTERVENTIONS:  - Administer IV fluids if ordered to ensure adequate hydration  - Maintain NPO status until nausea and vomiting are resolved  - Nasogastric tube if ordered  - Administer ordered antiemetic medications as needed  - Provide nonpharmacologic comfort measures as appropriate  - Advance diet as tolerated, if ordered  - Consider nutrition services referral to assist patient with adequate nutrition and appropriate food choices  Outcome: Progressing  Goal: Maintains adequate nutritional intake  Description: INTERVENTIONS:  - Monitor percentage of each meal consumed  - Identify factors contributing to decreased intake, treat as appropriate  - Assist with meals as needed  - Monitor I&O, weight, and lab values if indicated  - Obtain nutrition services referral as needed  Outcome: Progressing     Problem: Ineffective Coping  Goal: Participates in unit activities  Description: Interventions:  - Provide therapeutic environment   - Provide required programming   - Redirect inappropriate behaviors   Outcome: Progressing     Problem: DISCHARGE PLANNING  Goal: Discharge to home or other facility with appropriate resources  Description: INTERVENTIONS:  - Identify barriers to discharge w/patient and caregiver  - Arrange for needed discharge resources and transportation as appropriate  - Identify discharge learning needs (meds, wound care, etc )  - Arrange for interpretive services to assist at discharge as needed  - Refer to Case Management Department for coordinating discharge planning if the patient needs post-hospital services based on physician/advanced practitioner order or complex needs related to functional status, cognitive ability, or social support system  Outcome: Progressing

## 2022-12-29 NOTE — PROGRESS NOTES
12/29/22 0801   Team Meeting   Meeting Type Daily Rounds   Team Members Present   Team Members Present Physician;Nurse;   Physician Team Member Thaddeus   Nursing Team Member Hakeem Management Team Member Leeanna   Patient/Family Present   Patient Present No   Patient's Family Present No     Pt depressed, anxious and continues to reports AH  Visible attends groups  Compliant with meds and meals  Given prn haldol  Continue med management  Encourage use of coping skills prior to using prn  Continue ECT  Continue to monitor  Discharge to be determined

## 2022-12-29 NOTE — CASE MANAGEMENT
Met with patient in the [facility]  Pt express concerns of not being able have her SS Card active  When clarified pt tells writer she is referring to a debit card and not SS card  Pt requested a phone number of social security office to verify if her new debit card was sent or not  Pt claims she requested new card prior to admission  Pt denies SI, HI and AVH  Continue to monitor  Pt is provided with [employer] office phone number and pt plans to call this office

## 2022-12-29 NOTE — PLAN OF CARE
Pt appears bright and cooperative  Pt was praised for her engagement in groups and unit activities  Pt often seen making phone calls  Pt tolerating ECT, completed ECT 4 of 12  Pt reports moderate depression and AH  Pt denies SI, HI and VH  Continue to monitor       Problem: DISCHARGE PLANNING  Goal: Discharge to home or other facility with appropriate resources  Description: INTERVENTIONS:  - Identify barriers to discharge w/patient and caregiver  - Arrange for needed discharge resources and transportation as appropriate  - Identify discharge learning needs (meds, wound care, etc )  - Arrange for interpretive services to assist at discharge as needed  - Refer to Case Management Department for coordinating discharge planning if the patient needs post-hospital services based on physician/advanced practitioner order or complex needs related to functional status, cognitive ability, or social support system  Outcome: Progressing

## 2022-12-30 ENCOUNTER — APPOINTMENT (INPATIENT)
Dept: PREOP/PACU | Facility: HOSPITAL | Age: 52
End: 2022-12-30
Attending: PSYCHIATRY & NEUROLOGY

## 2022-12-30 ENCOUNTER — ANESTHESIA (INPATIENT)
Dept: PREOP/PACU | Facility: HOSPITAL | Age: 52
End: 2022-12-30

## 2022-12-30 ENCOUNTER — ANESTHESIA EVENT (INPATIENT)
Dept: PREOP/PACU | Facility: HOSPITAL | Age: 52
End: 2022-12-30

## 2022-12-30 LAB
ATRIAL RATE: 82 BPM
ATRIAL RATE: 82 BPM
P AXIS: 2 DEGREES
P AXIS: 3 DEGREES
PR INTERVAL: 164 MS
PR INTERVAL: 166 MS
QRS AXIS: 60 DEGREES
QRS AXIS: 62 DEGREES
QRSD INTERVAL: 82 MS
QRSD INTERVAL: 82 MS
QT INTERVAL: 430 MS
QT INTERVAL: 430 MS
QTC INTERVAL: 502 MS
QTC INTERVAL: 502 MS
T WAVE AXIS: -32 DEGREES
T WAVE AXIS: -32 DEGREES
VENTRICULAR RATE: 82 BPM
VENTRICULAR RATE: 82 BPM

## 2022-12-30 PROCEDURE — GZB4ZZZ OTHER ELECTROCONVULSIVE THERAPY: ICD-10-PCS | Performed by: PSYCHIATRY & NEUROLOGY

## 2022-12-30 RX ORDER — GLYCOPYRROLATE 0.2 MG/ML
INJECTION INTRAMUSCULAR; INTRAVENOUS AS NEEDED
Status: DISCONTINUED | OUTPATIENT
Start: 2022-12-30 | End: 2022-12-30

## 2022-12-30 RX ORDER — KETOROLAC TROMETHAMINE 30 MG/ML
INJECTION, SOLUTION INTRAMUSCULAR; INTRAVENOUS AS NEEDED
Status: DISCONTINUED | OUTPATIENT
Start: 2022-12-30 | End: 2022-12-30

## 2022-12-30 RX ORDER — LABETALOL HYDROCHLORIDE 5 MG/ML
INJECTION, SOLUTION INTRAVENOUS AS NEEDED
Status: DISCONTINUED | OUTPATIENT
Start: 2022-12-30 | End: 2022-12-30

## 2022-12-30 RX ORDER — SUCCINYLCHOLINE/SOD CL,ISO/PF 100 MG/5ML
SYRINGE (ML) INTRAVENOUS AS NEEDED
Status: DISCONTINUED | OUTPATIENT
Start: 2022-12-30 | End: 2022-12-30

## 2022-12-30 RX ORDER — CLOZAPINE 25 MG/1
50 TABLET ORAL
Status: DISCONTINUED | OUTPATIENT
Start: 2023-01-01 | End: 2022-12-30

## 2022-12-30 RX ORDER — MIDAZOLAM HYDROCHLORIDE 2 MG/2ML
INJECTION, SOLUTION INTRAMUSCULAR; INTRAVENOUS AS NEEDED
Status: DISCONTINUED | OUTPATIENT
Start: 2022-12-30 | End: 2022-12-30

## 2022-12-30 RX ORDER — ESMOLOL HYDROCHLORIDE 10 MG/ML
INJECTION INTRAVENOUS AS NEEDED
Status: DISCONTINUED | OUTPATIENT
Start: 2022-12-30 | End: 2022-12-30

## 2022-12-30 RX ADMIN — MIDAZOLAM 2 MG: 1 INJECTION INTRAMUSCULAR; INTRAVENOUS at 06:35

## 2022-12-30 RX ADMIN — SODIUM CHLORIDE 50 ML/HR: 0.9 INJECTION, SOLUTION INTRAVENOUS at 05:51

## 2022-12-30 RX ADMIN — CLOZAPINE 100 MG: 100 TABLET ORAL at 09:07

## 2022-12-30 RX ADMIN — CHOLECALCIFEROL TAB 25 MCG (1000 UNIT) 1000 UNITS: 25 TAB at 09:06

## 2022-12-30 RX ADMIN — CLOZAPINE 100 MG: 100 TABLET ORAL at 14:54

## 2022-12-30 RX ADMIN — KETOROLAC TROMETHAMINE 30 MG: 30 INJECTION, SOLUTION INTRAMUSCULAR; INTRAVENOUS at 06:12

## 2022-12-30 RX ADMIN — VENLAFAXINE HYDROCHLORIDE 150 MG: 150 CAPSULE, EXTENDED RELEASE ORAL at 09:07

## 2022-12-30 RX ADMIN — FENOFIBRATE 145 MG: 145 TABLET, COATED ORAL at 09:07

## 2022-12-30 RX ADMIN — POLYETHYLENE GLYCOL 3350 17 G: 17 POWDER, FOR SOLUTION ORAL at 09:06

## 2022-12-30 RX ADMIN — ESMOLOL HYDROCHLORIDE 50 MG: 100 INJECTION, SOLUTION INTRAVENOUS at 06:21

## 2022-12-30 RX ADMIN — CALCIUM CARBONATE (ANTACID) CHEW TAB 500 MG 500 MG: 500 CHEW TAB at 16:29

## 2022-12-30 RX ADMIN — DOCUSATE SODIUM 100 MG: 100 CAPSULE, LIQUID FILLED ORAL at 09:07

## 2022-12-30 RX ADMIN — CLOZAPINE 275 MG: 25 TABLET ORAL at 21:00

## 2022-12-30 RX ADMIN — NICOTINE 1 PATCH: 21 PATCH, EXTENDED RELEASE TRANSDERMAL at 09:06

## 2022-12-30 RX ADMIN — LIDOCAINE 1 PATCH: 50 PATCH CUTANEOUS at 09:08

## 2022-12-30 RX ADMIN — GLYCOPYRROLATE 1 MG: 1 TABLET ORAL at 18:26

## 2022-12-30 RX ADMIN — SENNOSIDES AND DOCUSATE SODIUM 2 TABLET: 50; 8.6 TABLET ORAL at 21:00

## 2022-12-30 RX ADMIN — DOCUSATE SODIUM 100 MG: 100 CAPSULE, LIQUID FILLED ORAL at 18:25

## 2022-12-30 RX ADMIN — HALOPERIDOL 7.5 MG: 5 TABLET ORAL at 18:25

## 2022-12-30 RX ADMIN — GLYCOPYRROLATE 0.2 MG: 0.2 INJECTION, SOLUTION INTRAMUSCULAR; INTRAVENOUS at 06:12

## 2022-12-30 RX ADMIN — Medication 100 MG: at 06:21

## 2022-12-30 RX ADMIN — GLYCOPYRROLATE 1 MG: 1 TABLET ORAL at 09:07

## 2022-12-30 RX ADMIN — LORATADINE 10 MG: 10 TABLET ORAL at 09:08

## 2022-12-30 RX ADMIN — ACETAMINOPHEN 975 MG: 325 TABLET ORAL at 09:08

## 2022-12-30 RX ADMIN — LABETALOL HYDROCHLORIDE 20 MG: 5 INJECTION, SOLUTION INTRAVENOUS at 06:21

## 2022-12-30 RX ADMIN — HALOPERIDOL 5 MG: 5 TABLET ORAL at 09:07

## 2022-12-30 NOTE — PROCEDURES
Procedure Note - ECT  Denis Hendrix 47 y o  female MRN: [id number]    Time out was taken with staff to confirm correct patient and correct procedure to be performed  Patient reports she has not yet noticed much improvement in her mood  Still reports feeling depressed  She notes that headaches post procedure are improved with the addition of Toradol  She notes mild short term memory difficulties but feels it is manageable  Agrees to continue with the treatment  Session Number: 005    Diagnosis: Principal Problem:    Schizoaffective disorder, bipolar type ([facility])  Active Problems:    LYNN (generalized anxiety disorder)    Hyperlipidemia    Post-traumatic stress disorder, chronic    Mild intermittent asthma without complication    Gastroesophageal reflux disease    Vitamin D deficiency    Dependence on nicotine from cigarettes    Mild neurocognitive disorder    Medical clearance for psychiatric admission    Non-seasonal allergic rhinitis    Chronic midline low back pain without sciatica      ECT Type: Inpatient, Acute    Anesthesia: Methohexital    Electrode Placement: Bilateral    Energy level: 65 %      Seizure Duration     EE Sec  EMG :Not recorded    Post-ictal Suppression Index:69 4 %    Results:Clinical seizure was satisfactory, Patient tolerated ECT well  She was restless post procedure and received 2mg of Versed      Vitals:    22 0700   BP: 108/70   Pulse: 89   Resp:    Temp: 98 7 °F (37 1 °C)   SpO2: 94%        Medication Administration - last 24 hours from 2022 0723 to 2022 4175       Date/Time Order Dose Route Action Action by     2022 0805 EST cholecalciferol (VITAMIN D3) tablet 1,000 Units 1,000 Units Oral Given Anita Elmore RN     2022 1728 EST docusate sodium (COLACE) capsule 100 mg 100 mg Oral Given Anita Elmore RN     2022 0805 EST docusate sodium (COLACE) capsule 100 mg 100 mg Oral Given Anita Elmore RN     2022 0805 EST fenofibrate (Brad Jonnie) tablet 145 mg 145 mg Oral Given Maine Dunn, YUDY     12/29/2022 2036 EST lidocaine (LIDODERM) 5 % patch 1 patch 1 patch Topical Patch Removed Bracken Jesse, RN     12/29/2022 8395 EST lidocaine (LIDODERM) 5 % patch 1 patch 1 patch Topical Medication Applied Maine Dunn, YUDY     12/29/2022 1729 EST fluticasone (FLONASE) 50 mcg/act nasal spray 1 spray 1 spray Each Nare Refused Maine Dunn, RN     12/29/2022 8501 EST fluticasone (FLONASE) 50 mcg/act nasal spray 1 spray 1 spray Each Nare Given Maine uDnn, RN     12/29/2022 0805 EST loratadine (CLARITIN) tablet 10 mg 10 mg Oral Given Maine Dunn, YUDY     12/29/2022 0902 EST nicotine (NICODERM CQ) 21 mg/24 hr TD 24 hr patch 1 patch 1 patch Transdermal Medication Applied Maine Dunn, YUDY     12/29/2022 0168 EST nicotine (NICODERM CQ) 21 mg/24 hr TD 24 hr patch 1 patch 1 patch Transdermal Patch Removed Maine Dunn, YUDY     12/29/2022 0805 EST pantoprazole (PROTONIX) EC tablet 40 mg 40 mg Oral Given Maine Dunn, RN     12/29/2022 0805 EST venlafaxine (EFFEXOR-XR) 24 hr capsule 150 mg 150 mg Oral Given Maine Dunn, RN     12/29/2022 1500 EST methocarbamol (ROBAXIN) tablet 500 mg 500 mg Oral Given Maine Dunn, RN     12/29/2022 1728 EST glycopyrrolate (ROBINUL) tablet 1 mg 1 mg Oral Given Maine Dunn, RN     12/29/2022 0805 EST glycopyrrolate (ROBINUL) tablet 1 mg 1 mg Oral Given Maine Dunn, RN     12/29/2022 1314 EST cloZAPine (CLOZARIL) tablet 100 mg 100 mg Oral Given Maine Dunn, RN     12/29/2022 0805 EST cloZAPine (CLOZARIL) tablet 100 mg 100 mg Oral Given Maine Dunn, RN     12/29/2022 1123 EST SUMAtriptan (IMITREX) tablet 50 mg 50 mg Oral Given Maine Dunn, RN     12/29/2022 0806 EST polyethylene glycol (MIRALAX) packet 17 g 17 g Oral Given Maine Dunn RN     12/29/2022 2035 EST senna-docusate sodium (SENOKOT S) 8 6-50 mg per tablet 2 tablet 2 tablet Oral Given Naveen Molina RN     12/29/2022 1123 EST haloperidol (HALDOL) tablet 2 mg 2 mg Oral Given Pedro Etienne, YUDY     12/29/2022 2034 EST cloZAPine (CLOZARIL) tablet 275 mg 275 mg Oral Given Ricardo Maldonado, YUDY     12/30/2022 0654 EST sodium chloride 0 9 % infusion 0 mL/hr Intravenous Stopped Brendan Zepeda, RN     12/30/2022 2471 EST sodium chloride 0 9 % infusion -- Intravenous Restarted Lakeshia Alexandre, CRNA     12/30/2022 7492 EST sodium chloride 0 9 % infusion -- Intravenous Paused Lakeshia Alexandre, CRNA     12/30/2022 6671 EST sodium chloride 0 9 % infusion -- Intravenous Continue from [address] White, CRNA     12/30/2022 9242 EST sodium chloride 0 9 % infusion 50 mL/hr Intravenous [facility], [address] [place] [address]     12/29/2022 1728 EST haloperidol (HALDOL) tablet 5 mg 5 mg Oral Given Pedro Etienne, YUDY     12/29/2022 0805 EST haloperidol (HALDOL) tablet 5 mg 5 mg Oral Given Pedro Etienne, YUDY           Media Information  Document Information    Clinical Image - Mobile Device   EEG   12/30/2022 06:27   Attached To:    Hospital Encounter on 12/14/22     Source Information    Niranjan Mckeon MD  [facility]

## 2022-12-30 NOTE — PROGRESS NOTES
12/29/22 1400   Activity/Group Checklist   Group Other (Comment)  (Group Art Therapy/Psychodynamic, Open Choice with Discussion)   Attendance Attended   Attendance Duration (min) Greater than 60   Interactions Interacted appropriately  (Mildly scattered, but reasonable when confronted)   Affect/Mood Appropriate   Goals Achieved Discussed coping strategies; Able to listen to others; Able to engage in interactions; Able to recieve feedback  (Patient did not engage materials; preoccupied with paranoid thoughts)

## 2022-12-30 NOTE — PROGRESS NOTES
12/30/22 0837   Team Meeting   Meeting Type Daily Rounds   Team Members Present   Team Members Present Physician;Nurse;   Physician Team Member Wade Lopez   Nursing Team Member Hakeem Management Team Member Leeanna   Patient/Family Present   Patient Present No   Patient's Family Present No     Pt remains disorganized and paranoid  Compliant with meds and meals  Continue med and ECT treatments  Continue to monitor  Discharge to be determined

## 2022-12-30 NOTE — ANESTHESIA PREPROCEDURE EVALUATION
Procedure:  ECT INPATIENT    Relevant Problems   CARDIO   (+) Hyperlipidemia      GI/HEPATIC   (+) Gastroesophageal reflux disease      MUSCULOSKELETAL   (+) Chronic midline low back pain without sciatica   (+) Degenerative disc disease, lumbar      NEURO/PSYCH   (+) Chronic midline low back pain without sciatica   (+) LYNN (generalized anxiety disorder)   (+) History of alcohol dependence (HCC)   (+) Other headache syndrome   (+) Post-traumatic stress disorder, chronic      PULMONARY   (+) Emphysema lung (HCC)   (+) Mild intermittent asthma without complication        Physical Exam    Airway    Mallampati score: II  TM Distance: >3 FB  Neck ROM: full     Dental       Cardiovascular  Cardiovascular exam normal    Pulmonary  Pulmonary exam normal     Other Findings        Anesthesia Plan  ASA Score- 3     Anesthesia Type- general with ASA Monitors  Additional Monitors:   Airway Plan:           Plan Factors-    Chart reviewed  Existing labs reviewed  Induction- intravenous  Postoperative Plan-     Informed Consent- Anesthetic plan and risks discussed with patient  I personally reviewed this patient with the CRNA  Discussed and agreed on the Anesthesia Plan with the CRNA  Verito Christie

## 2022-12-30 NOTE — PROGRESS NOTES
12/30/22 1100   Activity/Group Checklist   Group   (recovery group)   Attendance Attended   Attendance Duration (min) 46-60   Interactions Interacted appropriately   Affect/Mood Appropriate   Goals Achieved Discussed coping strategies; Able to listen to others; Able to engage in interactions; Able to self-disclose; Able to recieve feedback; Discussed self-esteem issues

## 2022-12-30 NOTE — PROGRESS NOTES
Progress Note - Behavioral Health   Denis Hendrix 47 y o  female MRN: [id number]  Unit/Bed#: U 377-01 Encounter: [id number]    Assessment/Plan   Principal Problem:    Schizoaffective disorder, bipolar type ([address] )  Active Problems:    LYNN (generalized anxiety disorder)    Hyperlipidemia    Post-traumatic stress disorder, chronic    Mild intermittent asthma without complication    Gastroesophageal reflux disease    Vitamin D deficiency    Dependence on nicotine from cigarettes    Mild neurocognitive disorder    Medical clearance for psychiatric admission    Non-seasonal allergic rhinitis    Chronic midline low back pain without sciatica      Recommended Treatment:   No psychopharmacologic changes necessary at this moment with the exception of increasing Haldol to 7 5 mg twice daily  On Sunday we plan to increase clozapine to 300 mg at bedtime for psychotic symptoms; will continue to assess daily for further optimization  Continue with pharmacotherapy, group therapy, milieu therapy and occupational therapy  Continue to assess for adverse medication side effects  Encourage Danna Lamb to participate in nonverbal forms of therapy including journaling and art/music therapy  Continue frequent safety checks and vitals per unit protocol  Continue to engage CM/SW to assist with collateral, disposition planning, and the implementation of an individualized, patient-centered plan of care  Continue medical management by medical team   Case discussed with treatment team     Legal Status: 201  ------------------------------------------------------------    Subjective: All documentation including nursing notes, medication history to ensure medication adherence on the unit, labs, and vitals were reviewed  Danna Perez was evaluated this morning for continuity of care and no acute distress noted throughout the evaluation   Over the past 24 hours per nursing report, Danna Perez has been cooperative on the unit and compliant with medications  Today, Terry Gordon is consenting for safety on the unit  Terry Gordon reports feeling " anxious " Terry Gordon notes having good sleep  Hersjulio Gordon states having a good appetite  Hersjulio Gordon has been taking the medications as prescribed and reporting no side effects  Patient states that she is feeling anxious and she would like to go home  Discussed at length with her about this and patient is okay to stay  Terry Gordon denies suicidal ideations  Hersjulio Gordon denies homicidal ideations  Regarding hallucinations, Terry Gordon denies but appears to be internally preoccupied  PRNs overnight: none  VS: Reviewed, Clinically obese, otherwise within normal limits    Progress Toward Goals: slow improvement    Psychiatric Review of Systems:  Behavior over the last 24 hours:  unchanged  Sleep: normal  Appetite: normal  Medication side effects: No   ROS: all other systems are negative    Vital signs in last 24 hours:  Temp:  [96 9 °F (36 1 °C)-98 7 °F (37 1 °C)] 97 6 °F (36 4 °C)  HR:  [88-99] 88  Resp:  [11-42] 16  BP: ()/(57-95) 114/57    Laboratory results:  I have personally reviewed all pertinent laboratory/tests results    Recent Results (from the past 48 hour(s))   CBC and differential    Collection Time: 12/29/22  6:11 AM   Result Value Ref Range    WBC 7 52 4 31 - 10 16 Thousand/uL    RBC 4 78 3 81 - 5 12 Million/uL    Hemoglobin 13 2 11 5 - 15 4 g/dL    Hematocrit 42 9 34 8 - 46 1 %    MCV 90 82 - 98 fL    MCH 27 6 26 8 - 34 3 pg    MCHC 30 8 (L) 31 4 - 37 4 g/dL    RDW 14 2 11 6 - 15 1 %    MPV 9 3 8 9 - 12 7 fL    Platelets 455 989 - 150 Thousands/uL    nRBC 0 /100 WBCs    Neutrophils Relative 62 43 - 75 %    Immat GRANS % 1 0 - 2 %    Lymphocytes Relative 25 14 - 44 %    Monocytes Relative 7 4 - 12 %    Eosinophils Relative 5 0 - 6 %    Basophils Relative 0 0 - 1 %    Neutrophils Absolute 4 67 1 85 - 7 62 Thousands/µL    Immature Grans Absolute 0 06 0 00 - 0 20 Thousand/uL    Lymphocytes Absolute 1 85 0 60 - 4 47 Thousands/µL Monocytes Absolute 0 54 0 17 - 1 22 Thousand/µL    Eosinophils Absolute 0 37 0 00 - 0 61 Thousand/µL    Basophils Absolute 0 03 0 00 - 0 10 Thousands/µL         Mental Status Evaluation:    Appearance:  casually dressed, adequate grooming, looks older than stated age, overweight, Overtly appearing  female in no apparent acute distress  ,  Good eye contact   Behavior:  cooperative, Anxious   Speech:  normal rate and volume   Mood:  "Anxious"   Affect:  Anxious   Thought Process:  Less disorganized than previous   Associations: circumstantial associations   Thought Content:  bizarre and somatic delusions, paranoid ideation   Perceptual Disturbances: Denies auditory or visual hallucinations and Internally preoccupied   Risk Potential: Suicidal ideation - None at present  Homicidal ideation - None at present  Potential for aggression - Not at present   Sensorium:  oriented to person, place and time/date   Memory:  recent and remote memory grossly intact   Consciousness:  alert and awake   Attention/Concentration: attention span and concentration are age appropriate   Insight:  limited   Judgment: limited   Gait/Station: normal gait/station   Motor Activity: no abnormal movements       Current Medications:  Current Facility-Administered Medications   Medication Dose Route Frequency Provider Last Rate   • acetaminophen  650 mg Oral Q6H PRN Jazmyn Chavarria MD     • acetaminophen  650 mg Oral Q4H PRN Jazmyn Chavarria MD     • acetaminophen  975 mg Oral Q6H PRN Jazmyn Chavarria MD     • albuterol  2 puff Inhalation Q6H PRN Chepe Talbert MD     • haloperidol lactate  2 5 mg Intramuscular Q6H PRN Max 4/day Jason Player, DO      And   • LORazepam  1 mg Intramuscular Q6H PRN Max 4/day Jason Player, DO      And   • benztropine  0 5 mg Intramuscular Q6H PRN Max 4/day Jason Player, DO     • benztropine  1 mg Intramuscular Q4H PRN Max 6/day Jazmyn Chavarria MD     • haloperidol lactate  5 mg Intramuscular Q4H PRN Max 4/day Belvia Pounds, DO      And   • LORazepam  2 mg Intramuscular Q4H PRN Max 4/day Belvia Pounds, DO      And   • benztropine  1 mg Intramuscular Q4H PRN Max 4/day Belvia Pounds, DO     • benztropine  1 mg Oral Q4H PRN Max 6/day Tasia Bustos MD     • calcium carbonate  500 mg Oral Daily PRN Agus Draperjdos, DO     • cholecalciferol  1,000 Units Oral Daily Tasia Bustos MD     • cloZAPine  100 mg Oral After Lunch Chan Dixon MD     • cloZAPine  100 mg Oral Daily Chan Dixon MD     • cloZAPine  275 mg Oral HS Belvia Pounds, DO     • hydrOXYzine HCL  50 mg Oral Q6H PRN Max 4/day Tasia Bustos MD      Or   • diphenhydrAMINE  50 mg Intramuscular Q6H PRN Tasia Bustos MD     • docusate sodium  100 mg Oral BID Tasia Bustos MD     • fenofibrate  145 mg Oral Daily Tasia Bustos MD     • fluticasone  1 spray Each Nare BID Tasia Bustos MD     • glycerin-hypromellose-  1 drop Both Eyes Q3H PRN Tasia Bustos MD     • glycopyrrolate  1 mg Oral BID Chan Dixon MD     • haloperidol  2 mg Oral Q4H PRN Max 6/day Belvia Pounds, DO     • haloperidol  5 mg Oral Q6H PRN Max 4/day Belvia Pounds, DO     • haloperidol  5 mg Oral Q4H PRN Max 4/day Belvia Pounds, DO     • haloperidol  5 mg Oral BID Belvia Pounds, DO     • hydrocortisone   Topical 4x Daily PRN Octavia Reddish, PA-C     • hydrocortisone   Topical 4x Daily PRN Octavia Reddish, PA-C     • hydrOXYzine HCL  100 mg Oral Q6H PRN Max 4/day Tasia Bustos MD      Or   • LORazepam  2 mg Intramuscular Q6H PRN Tasia Bustos MD     • hydrOXYzine HCL  25 mg Oral Q6H PRN Max 4/day Tasia Bustos MD     • lidocaine  1 patch Topical Daily Tasia Bustos MD     • loratadine  10 mg Oral Daily Tasia Bustos MD     • magnesium hydroxide  30 mL Oral Daily PRN Octavia Duenas PA-C     • methocarbamol  500 mg Oral Q6H PRN Yaron Hussein MD     • nicotine  1 patch Transdermal Daily Yaron Hussein MD     • nicotine polacrilex  4 mg Oral Q2H PRN Yaron Hussein MD     • pantoprazole  40 mg Oral Early Morning Yaron Hussein MD     • polyethylene glycol  17 g Oral Daily Jah Huber MD     • propranolol  10 mg Oral Q8H PRN Yaron Hussein MD     • senna-docusate sodium  1 tablet Oral Daily PRN Yaron Hussein MD     • senna-docusate sodium  2 tablet Oral HS Jah Huber MD     • sorbitol  30 mL Oral Q1H PRN Yaron Hussein MD     • SUMAtriptan  50 mg Oral Daily PRN JARED Powell     • traZODone  50 mg Oral HS PRN Yaron Hussein MD     • venlafaxine  150 mg Oral Daily Yaron Hussein MD         Suicide/Homicide Risk Assessment:  Risk of Harm to Self:   Based on today's assessment, Sarahkarla Robles presents the following risk of harm to self: high    Risk of Harm to Others:  Based on today's assessment, Sarah Robles presents the following risk of harm to others: moderate    The following interventions are recommended: behavioral checks every 7 minutes, continued hospitalization on locked unit    Behavioral Health Medications: All current active meds have been reviewed  Changes as in plan section above  Risks, benefits and possible side effects of Medications:   Risks, benefits, and possible side effects of medications explained to patient and patient verbalizes understanding  Counseling / Coordination of Care:  Patient's progress discussed with staff in treatment team meeting  Medications, treatment progress and treatment plan reviewed with patient  Lazaromisbah DO Miya 12/30/22  Psychiatry Resident, PGY-II    This note was completed in part utilizing Dragon dictation Software   Grammatical, translation, syntax errors, random word insertions, spelling mistakes, and incomplete sentences may be an occasional consequence of this system secondary to software limitations with voice recognition, ambient noise, and hardware issues  If you have any questions or concerns about the content, text, or information contained within the body of this dictation, please contact the provider for clarification

## 2022-12-31 RX ADMIN — VENLAFAXINE HYDROCHLORIDE 150 MG: 150 CAPSULE, EXTENDED RELEASE ORAL at 08:25

## 2022-12-31 RX ADMIN — HALOPERIDOL 5 MG: 5 TABLET ORAL at 11:54

## 2022-12-31 RX ADMIN — CLOZAPINE 100 MG: 100 TABLET ORAL at 16:06

## 2022-12-31 RX ADMIN — HALOPERIDOL 7.5 MG: 5 TABLET ORAL at 08:24

## 2022-12-31 RX ADMIN — FENOFIBRATE 145 MG: 145 TABLET, COATED ORAL at 08:25

## 2022-12-31 RX ADMIN — DOCUSATE SODIUM 100 MG: 100 CAPSULE, LIQUID FILLED ORAL at 18:08

## 2022-12-31 RX ADMIN — GLYCOPYRROLATE 1 MG: 1 TABLET ORAL at 08:25

## 2022-12-31 RX ADMIN — CLOZAPINE 275 MG: 25 TABLET ORAL at 20:52

## 2022-12-31 RX ADMIN — SENNOSIDES AND DOCUSATE SODIUM 2 TABLET: 50; 8.6 TABLET ORAL at 20:52

## 2022-12-31 RX ADMIN — LORATADINE 10 MG: 10 TABLET ORAL at 08:25

## 2022-12-31 RX ADMIN — CHOLECALCIFEROL TAB 25 MCG (1000 UNIT) 1000 UNITS: 25 TAB at 08:24

## 2022-12-31 RX ADMIN — DOCUSATE SODIUM 100 MG: 100 CAPSULE, LIQUID FILLED ORAL at 08:24

## 2022-12-31 RX ADMIN — GLYCOPYRROLATE 1 MG: 1 TABLET ORAL at 18:08

## 2022-12-31 RX ADMIN — CLOZAPINE 100 MG: 100 TABLET ORAL at 08:25

## 2022-12-31 RX ADMIN — HALOPERIDOL 7.5 MG: 5 TABLET ORAL at 18:08

## 2022-12-31 NOTE — PROGRESS NOTES
Progress Note - Behavioral Health   Alyssa Quintero 47 y o  female MRN: [id number]  Unit/Bed#: [facility] 377-01 Encounter: [id number]    Assessment:  Principal Problem:    Schizoaffective disorder, bipolar type ([address] )  Active Problems:    LYNN (generalized anxiety disorder)    Hyperlipidemia    Post-traumatic stress disorder, chronic    Mild intermittent asthma without complication    Gastroesophageal reflux disease    Vitamin D deficiency    Dependence on nicotine from cigarettes    Mild neurocognitive disorder    Medical clearance for psychiatric admission    Non-seasonal allergic rhinitis    Chronic midline low back pain without sciatica      Plan:  --Continue with psychiatric hospitalization  --Continue with individual, group, and milieu therapy  --Continue the following medications:  Current Facility-Administered Medications   Medication Dose Route Frequency   • acetaminophen (TYLENOL) tablet 650 mg  650 mg Oral Q6H PRN   • acetaminophen (TYLENOL) tablet 650 mg  650 mg Oral Q4H PRN   • acetaminophen (TYLENOL) tablet 975 mg  975 mg Oral Q6H PRN   • albuterol (PROVENTIL HFA,VENTOLIN HFA) inhaler 2 puff  2 puff Inhalation Q6H PRN   • haloperidol lactate (HALDOL) injection 2 5 mg  2 5 mg Intramuscular Q6H PRN Max 4/day    And   • LORazepam (ATIVAN) injection 1 mg  1 mg Intramuscular Q6H PRN Max 4/day    And   • benztropine (COGENTIN) injection 0 5 mg  0 5 mg Intramuscular Q6H PRN Max 4/day   • benztropine (COGENTIN) injection 1 mg  1 mg Intramuscular Q4H PRN Max 6/day   • haloperidol lactate (HALDOL) injection 5 mg  5 mg Intramuscular Q4H PRN Max 4/day    And   • LORazepam (ATIVAN) injection 2 mg  2 mg Intramuscular Q4H PRN Max 4/day    And   • benztropine (COGENTIN) injection 1 mg  1 mg Intramuscular Q4H PRN Max 4/day   • benztropine (COGENTIN) tablet 1 mg  1 mg Oral Q4H PRN Max 6/day   • calcium carbonate (TUMS) chewable tablet 500 mg  500 mg Oral Daily PRN   • cholecalciferol (VITAMIN D3) tablet 1,000 Units  1,000 Units Oral Daily   • cloZAPine (CLOZARIL) tablet 100 mg  100 mg Oral After Lunch   • cloZAPine (CLOZARIL) tablet 100 mg  100 mg Oral Daily   • cloZAPine (CLOZARIL) tablet 275 mg  275 mg Oral HS   • [START ON 1/1/2023] cloZAPine (CLOZARIL) tablet 300 mg  300 mg Oral HS   • hydrOXYzine HCL (ATARAX) tablet 50 mg  50 mg Oral Q6H PRN Max 4/day    Or   • diphenhydrAMINE (BENADRYL) injection 50 mg  50 mg Intramuscular Q6H PRN   • docusate sodium (COLACE) capsule 100 mg  100 mg Oral BID   • fenofibrate (TRICOR) tablet 145 mg  145 mg Oral Daily   • fluticasone (FLONASE) 50 mcg/act nasal spray 1 spray  1 spray Each Nare BID   • glycerin-hypromellose- (ARTIFICIAL TEARS) ophthalmic solution 1 drop  1 drop Both Eyes Q3H PRN   • glycopyrrolate (ROBINUL) tablet 1 mg  1 mg Oral BID   • haloperidol (HALDOL) tablet 2 mg  2 mg Oral Q4H PRN Max 6/day   • haloperidol (HALDOL) tablet 5 mg  5 mg Oral Q6H PRN Max 4/day   • haloperidol (HALDOL) tablet 5 mg  5 mg Oral Q4H PRN Max 4/day   • haloperidol (HALDOL) tablet 7 5 mg  7 5 mg Oral BID   • hydrocortisone (ANUSOL-HC) 2 5 % rectal cream   Topical 4x Daily PRN   • hydrocortisone 1 % cream   Topical 4x Daily PRN   • hydrOXYzine HCL (ATARAX) tablet 100 mg  100 mg Oral Q6H PRN Max 4/day    Or   • LORazepam (ATIVAN) injection 2 mg  2 mg Intramuscular Q6H PRN   • hydrOXYzine HCL (ATARAX) tablet 25 mg  25 mg Oral Q6H PRN Max 4/day   • lidocaine (LIDODERM) 5 % patch 1 patch  1 patch Topical Daily   • loratadine (CLARITIN) tablet 10 mg  10 mg Oral Daily   • magnesium hydroxide (MILK OF MAGNESIA) oral suspension 30 mL  30 mL Oral Daily PRN   • methocarbamol (ROBAXIN) tablet 500 mg  500 mg Oral Q6H PRN   • nicotine (NICODERM CQ) 21 mg/24 hr TD 24 hr patch 1 patch  1 patch Transdermal Daily   • nicotine polacrilex (NICORETTE) gum 4 mg  4 mg Oral Q2H PRN   • pantoprazole (PROTONIX) EC tablet 40 mg  40 mg Oral Early Morning   • polyethylene glycol (MIRALAX) packet 17 g  17 g Oral Daily   • propranolol (INDERAL) tablet 10 mg  10 mg Oral Q8H PRN   • senna-docusate sodium (SENOKOT S) 8 6-50 mg per tablet 1 tablet  1 tablet Oral Daily PRN   • senna-docusate sodium (SENOKOT S) 8 6-50 mg per tablet 2 tablet  2 tablet Oral HS   • sorbitol 70 % solution 30 mL  30 mL Oral Q1H PRN   • SUMAtriptan (IMITREX) tablet 50 mg  50 mg Oral Daily PRN   • traZODone (DESYREL) tablet 50 mg  50 mg Oral HS PRN   • venlafaxine (EFFEXOR-XR) 24 hr capsule 150 mg  150 mg Oral Daily       Subjective: Patient was seen for continuation of care  Chart was reviewed and discussed with treatment team      Per nursing, patient has been calm and cooperative and denied any symptoms or unmet needs  She had previously articulated that she was experiencing a headache after her recent ECT  Today, patient was seen and examined at bedside  She states that her headache is improved and that she no longer is experiencing any pain whatsoever  She denies adverse effects to her psychiatric medications  She denies other acute psychiatric concerns or complaints at this time  Vitals:  Vitals:    12/31/22 0731   BP: 112/80   Pulse: 101   Resp: 16   Temp: 97 6 °F (36 4 °C)   SpO2: 92%       Laboratory results:    I have personally reviewed all pertinent laboratory/tests results  Recent Results (from the past 48 hour(s))   ECG 12 lead    Collection Time: 12/30/22 10:21 AM   Result Value Ref Range    Ventricular Rate 82 BPM    Atrial Rate 82 BPM    CA Interval 164 ms    QRSD Interval 82 ms    QT Interval 430 ms    QTC Interval 502 ms    P Axis 3 degrees    QRS Axis 60 degrees    T Wave Axis -32 degrees   ECG 12 lead    Collection Time: 12/30/22 10:22 AM   Result Value Ref Range    Ventricular Rate 82 BPM    Atrial Rate 82 BPM    CA Interval 166 ms    QRSD Interval 82 ms    QT Interval 430 ms    QTC Interval 502 ms    P Axis 2 degrees    QRS Axis 62 degrees    T Wave Axis -32 degrees        Current Medications:  Current medications as per above   All medications have been reviewed  Risks, benefits, alternatives, and possible side effects of patient's psychiatric medications were discussed with patient  Mental Status Evaluation:  Appearance: casually dressed, consistent with stated age  Motor: +psychomotor retardation, no gait abnormalities  Behavior: cooperative, answers questions appropriately  Speech: soft, normal rhythm  Mood: "tired"  Affect: constricted, depressed-appearing  Thought Process: linear and goal-oriented  Thought Content: denies auditory or visual hallucinations  Perception: denies delusions or other perceptual disturbances  Risk Potential: denies suicidal ideation, plan, or intent  Denies homicidal ideation  Sensorium: Oriented to person, place, time, and situation  Cognition: cognitive ability appears intact but was not quantitatively tested  Consciousness: alert and awake  Attention: intact, able to focus without difficulty  Insight: fair  Judgement: limited        Progress Toward Goals & Illness Status: Patient is not at goal  They are psychiatrically unstable and are not yet ready for discharge  The patient's condition currently requires active psychopharmacological medication management, interdisciplinary coordination with case management, and the utilization of adjunctive milieu and group therapy to augment psychopharmacological efficacy  The patient's risk of morbidity, and progression or decompensation of psychiatric disease, is high without this current treatment  This note has been constructed using a voice recognition system  There may be translation, syntax, or grammatical errors  If you have any questions, please contact the dictating provider

## 2022-12-31 NOTE — PLAN OF CARE
Problem: Alteration in Thoughts and Perception  Goal: Treatment Goal: Gain control of psychotic behaviors/thinking, reduce/eliminate presenting symptoms and demonstrate improved reality functioning upon discharge  12/31/2022 0740 by Dewayne Sanchez RN  Outcome: Progressing  12/30/2022 1802 by Dewayne Sanchez RN  Outcome: Progressing     Problem: Ineffective Coping  Goal: Identifies ineffective coping skills  12/31/2022 0740 by Dewayne Sanchez RN  Outcome: Progressing  12/30/2022 1802 by Dewayne Sanchez RN  Outcome: Progressing  Goal: Identifies healthy coping skills  12/31/2022 0740 by Dewayne Sanchez RN  Outcome: Progressing  12/30/2022 1802 by Dewayne Sanchez RN  Outcome: Progressing  Goal: Demonstrates healthy coping skills  12/31/2022 0740 by Dewayne Sanchez RN  Outcome: Progressing  12/30/2022 1802 by Dewayne Sanchez RN  Outcome: Progressing     Problem: Depression  Goal: Treatment Goal: Demonstrate behavioral control of depressive symptoms, verbalize feelings of improved mood/affect, and adopt new coping skills prior to discharge  12/31/2022 0740 by Dewayne Sanchez RN  Outcome: Progressing  12/30/2022 1802 by Dewayne Sanchez RN  Outcome: Progressing     Problem: Electroconvulsive therapy (ECT)  Goal: Treatment Goal: Demonstrate a reduction of confusion and improved orientation to person, place, time and/or situation upon discharge, according to optimum baseline/ability  12/31/2022 0740 by Dewayne Sanchez RN  Outcome: Progressing  12/30/2022 1802 by Dewayne Sanchez RN  Outcome: Progressing  Goal: Verbalizes/displays adequate comfort level or baseline comfort level  Description: Interventions:  - Encourage patient to monitor pain and request assistance  - Assess pain using appropriate pain scale  - Administer analgesics based on type and severity of pain and evaluate response  - Implement non-pharmacological measures as appropriate and evaluate response  - Consider cultural and social influences on pain and pain management  - Notify physician/advanced practitioner if interventions unsuccessful or patient reports new pain  12/31/2022 0740 by Lina Turner RN  Outcome: Progressing  12/30/2022 1802 by Lina Turner RN  Outcome: Progressing  Goal: Achieves stable or improved neurological status  Description: INTERVENTIONS  - Monitor and report changes in neurological status  - Monitor vital signs such as temperature, blood pressure, glucose, and any other labs ordered   - Initiate measures to prevent increased intracranial pressure  - Monitor for seizure activity and implement precautions if appropriate      12/31/2022 0740 by Lina Turner RN  Outcome: Progressing  12/30/2022 1802 by Lina Turner RN  Outcome: Progressing  Goal: Achieves maximal functionality and self care  Description: INTERVENTIONS  - Monitor swallowing and airway patency with patient fatigue and changes in neurological status  - Encourage and assist patient to increase activity and self care     - Encourage visually impaired, hearing impaired and aphasic patients to use assistive/communication devices  12/31/2022 0740 by Lina Turner RN  Outcome: Progressing  12/30/2022 1802 by Lina Turner RN  Outcome: Progressing  Goal: Maintain or return mobility to safest level of function  Description: INTERVENTIONS:  - Assess patient's ability to carry out ADLs; assess patient's baseline for ADL function and identify physical deficits which impact ability to perform ADLs (bathing, care of mouth/teeth, toileting, grooming, dressing, etc )  - Assess/evaluate cause of self-care deficits   - Assess range of motion  - Assess patient's mobility  - Assess patient's need for assistive devices and provide as appropriate  - Encourage maximum independence but intervene and supervise when necessary  - Involve family in performance of ADLs  - Assess for home care needs following discharge   - Consider OT consult to assist with ADL evaluation and planning for discharge  - Provide patient education as appropriate  12/31/2022 0740 by Jeanie Simon RN  Outcome: Progressing  12/30/2022 1802 by Jeanie Simon RN  Outcome: Progressing  Goal: Absence of urinary retention  Description: INTERVENTIONS:  - Assess patient’s ability to void and empty bladder  - Monitor I/O  - Bladder scan as needed  - Discuss with physician/AP medications to alleviate retention as needed  - Discuss catheterization for long term situations as appropriate  12/31/2022 0740 by Jeanie Simon RN  Outcome: Progressing  12/30/2022 1802 by Jeanie Simon RN  Outcome: Progressing  Goal: Minimal or absence of nausea and/or vomiting  Description: INTERVENTIONS:  - Administer IV fluids if ordered to ensure adequate hydration  - Maintain NPO status until nausea and vomiting are resolved  - Nasogastric tube if ordered  - Administer ordered antiemetic medications as needed  - Provide nonpharmacologic comfort measures as appropriate  - Advance diet as tolerated, if ordered  - Consider nutrition services referral to assist patient with adequate nutrition and appropriate food choices  12/31/2022 0740 by Jeanie Simon RN  Outcome: Progressing  12/30/2022 1802 by Jeanie Simon RN  Outcome: Progressing  Goal: Maintains adequate nutritional intake  Description: INTERVENTIONS:  - Monitor percentage of each meal consumed  - Identify factors contributing to decreased intake, treat as appropriate  - Assist with meals as needed  - Monitor I&O, weight, and lab values if indicated  - Obtain nutrition services referral as needed  12/31/2022 0740 by Jeanie Simon RN  Outcome: Progressing  12/30/2022 1802 by Jeanie Simon RN  Outcome: Progressing     Problem: DISCHARGE PLANNING  Goal: Discharge to home or other facility with appropriate resources  Description: INTERVENTIONS:  - Identify barriers to discharge w/patient and caregiver  - Arrange for needed discharge resources and transportation as appropriate  - Identify discharge learning needs (meds, wound care, etc )  - Arrange for interpretive services to assist at discharge as needed  - Refer to Case Management Department for coordinating discharge planning if the patient needs post-hospital services based on physician/advanced practitioner order or complex needs related to functional status, cognitive ability, or social support system  12/31/2022 0740 by Damien Richardson, YUDY  Outcome: Progressing  12/30/2022 1802 by Damien Richardson, RN  Outcome: Progressing

## 2023-01-01 RX ORDER — MAGNESIUM HYDROXIDE/ALUMINUM HYDROXICE/SIMETHICONE 120; 1200; 1200 MG/30ML; MG/30ML; MG/30ML
30 SUSPENSION ORAL EVERY 4 HOURS PRN
Status: DISCONTINUED | OUTPATIENT
Start: 2023-01-01 | End: 2023-01-11 | Stop reason: HOSPADM

## 2023-01-01 RX ORDER — ONDANSETRON 4 MG/1
4 TABLET, ORALLY DISINTEGRATING ORAL EVERY 6 HOURS PRN
Status: DISCONTINUED | OUTPATIENT
Start: 2023-01-01 | End: 2023-01-11 | Stop reason: HOSPADM

## 2023-01-01 RX ADMIN — FLUTICASONE PROPIONATE 1 SPRAY: 50 SPRAY, METERED NASAL at 08:27

## 2023-01-01 RX ADMIN — HALOPERIDOL 7.5 MG: 5 TABLET ORAL at 08:25

## 2023-01-01 RX ADMIN — TRAZODONE HYDROCHLORIDE 50 MG: 50 TABLET ORAL at 01:54

## 2023-01-01 RX ADMIN — TRAZODONE HYDROCHLORIDE 50 MG: 50 TABLET ORAL at 22:06

## 2023-01-01 RX ADMIN — NICOTINE 1 PATCH: 21 PATCH, EXTENDED RELEASE TRANSDERMAL at 08:25

## 2023-01-01 RX ADMIN — LIDOCAINE 1 PATCH: 50 PATCH CUTANEOUS at 08:27

## 2023-01-01 RX ADMIN — CALCIUM CARBONATE (ANTACID) CHEW TAB 500 MG 500 MG: 500 CHEW TAB at 09:50

## 2023-01-01 RX ADMIN — HYDROXYZINE HYDROCHLORIDE 100 MG: 50 TABLET ORAL at 22:06

## 2023-01-01 RX ADMIN — SENNOSIDES AND DOCUSATE SODIUM 2 TABLET: 50; 8.6 TABLET ORAL at 20:51

## 2023-01-01 RX ADMIN — GLYCOPYRROLATE 1 MG: 1 TABLET ORAL at 17:10

## 2023-01-01 RX ADMIN — CLOZAPINE 300 MG: 100 TABLET ORAL at 21:05

## 2023-01-01 RX ADMIN — GLYCOPYRROLATE 1 MG: 1 TABLET ORAL at 08:25

## 2023-01-01 RX ADMIN — VENLAFAXINE HYDROCHLORIDE 150 MG: 150 CAPSULE, EXTENDED RELEASE ORAL at 08:25

## 2023-01-01 RX ADMIN — DOCUSATE SODIUM 100 MG: 100 CAPSULE, LIQUID FILLED ORAL at 08:26

## 2023-01-01 RX ADMIN — PANTOPRAZOLE SODIUM 40 MG: 40 TABLET, DELAYED RELEASE ORAL at 06:45

## 2023-01-01 RX ADMIN — CHOLECALCIFEROL TAB 25 MCG (1000 UNIT) 1000 UNITS: 25 TAB at 08:26

## 2023-01-01 RX ADMIN — FENOFIBRATE 145 MG: 145 TABLET, COATED ORAL at 08:25

## 2023-01-01 RX ADMIN — CLOZAPINE 100 MG: 100 TABLET ORAL at 14:28

## 2023-01-01 RX ADMIN — HYDROXYZINE HYDROCHLORIDE 50 MG: 50 TABLET, FILM COATED ORAL at 01:53

## 2023-01-01 RX ADMIN — CLOZAPINE 100 MG: 100 TABLET ORAL at 08:25

## 2023-01-01 RX ADMIN — DOCUSATE SODIUM 100 MG: 100 CAPSULE, LIQUID FILLED ORAL at 17:10

## 2023-01-01 RX ADMIN — HALOPERIDOL 7.5 MG: 5 TABLET ORAL at 17:10

## 2023-01-01 RX ADMIN — POLYETHYLENE GLYCOL 3350 17 G: 17 POWDER, FOR SOLUTION ORAL at 08:25

## 2023-01-01 RX ADMIN — LORATADINE 10 MG: 10 TABLET ORAL at 08:26

## 2023-01-01 RX ADMIN — FLUTICASONE PROPIONATE 1 SPRAY: 50 SPRAY, METERED NASAL at 17:10

## 2023-01-01 NOTE — PROGRESS NOTES
Progress Note - Behavioral Health   Mariangel Hernandes 47 y o  female MRN: [id number]  Unit/Bed#: [facility] 377-01 Encounter: [id number]    Assessment:  Principal Problem:    Schizoaffective disorder, bipolar type ([address] )  Active Problems:    LYNN (generalized anxiety disorder)    Hyperlipidemia    Post-traumatic stress disorder, chronic    Mild intermittent asthma without complication    Gastroesophageal reflux disease    Vitamin D deficiency    Dependence on nicotine from cigarettes    Mild neurocognitive disorder    Medical clearance for psychiatric admission    Non-seasonal allergic rhinitis    Chronic midline low back pain without sciatica      Plan:  --Continue with psychiatric hospitalization  --Continue with individual, group, and milieu therapy  --Continue the following medications:  Current Facility-Administered Medications   Medication Dose Route Frequency   • acetaminophen (TYLENOL) tablet 650 mg  650 mg Oral Q6H PRN   • acetaminophen (TYLENOL) tablet 650 mg  650 mg Oral Q4H PRN   • acetaminophen (TYLENOL) tablet 975 mg  975 mg Oral Q6H PRN   • albuterol (PROVENTIL HFA,VENTOLIN HFA) inhaler 2 puff  2 puff Inhalation Q6H PRN   • haloperidol lactate (HALDOL) injection 2 5 mg  2 5 mg Intramuscular Q6H PRN Max 4/day    And   • LORazepam (ATIVAN) injection 1 mg  1 mg Intramuscular Q6H PRN Max 4/day    And   • benztropine (COGENTIN) injection 0 5 mg  0 5 mg Intramuscular Q6H PRN Max 4/day   • benztropine (COGENTIN) injection 1 mg  1 mg Intramuscular Q4H PRN Max 6/day   • haloperidol lactate (HALDOL) injection 5 mg  5 mg Intramuscular Q4H PRN Max 4/day    And   • LORazepam (ATIVAN) injection 2 mg  2 mg Intramuscular Q4H PRN Max 4/day    And   • benztropine (COGENTIN) injection 1 mg  1 mg Intramuscular Q4H PRN Max 4/day   • benztropine (COGENTIN) tablet 1 mg  1 mg Oral Q4H PRN Max 6/day   • calcium carbonate (TUMS) chewable tablet 500 mg  500 mg Oral Daily PRN   • cholecalciferol (VITAMIN D3) tablet 1,000 Units  1,000 Units Oral Daily   • cloZAPine (CLOZARIL) tablet 100 mg  100 mg Oral After Lunch   • cloZAPine (CLOZARIL) tablet 100 mg  100 mg Oral Daily   • cloZAPine (CLOZARIL) tablet 300 mg  300 mg Oral HS   • hydrOXYzine HCL (ATARAX) tablet 50 mg  50 mg Oral Q6H PRN Max 4/day    Or   • diphenhydrAMINE (BENADRYL) injection 50 mg  50 mg Intramuscular Q6H PRN   • docusate sodium (COLACE) capsule 100 mg  100 mg Oral BID   • fenofibrate (TRICOR) tablet 145 mg  145 mg Oral Daily   • fluticasone (FLONASE) 50 mcg/act nasal spray 1 spray  1 spray Each Nare BID   • glycerin-hypromellose- (ARTIFICIAL TEARS) ophthalmic solution 1 drop  1 drop Both Eyes Q3H PRN   • glycopyrrolate (ROBINUL) tablet 1 mg  1 mg Oral BID   • haloperidol (HALDOL) tablet 2 mg  2 mg Oral Q4H PRN Max 6/day   • haloperidol (HALDOL) tablet 5 mg  5 mg Oral Q6H PRN Max 4/day   • haloperidol (HALDOL) tablet 5 mg  5 mg Oral Q4H PRN Max 4/day   • haloperidol (HALDOL) tablet 7 5 mg  7 5 mg Oral BID   • hydrocortisone (ANUSOL-HC) 2 5 % rectal cream   Topical 4x Daily PRN   • hydrocortisone 1 % cream   Topical 4x Daily PRN   • hydrOXYzine HCL (ATARAX) tablet 100 mg  100 mg Oral Q6H PRN Max 4/day    Or   • LORazepam (ATIVAN) injection 2 mg  2 mg Intramuscular Q6H PRN   • hydrOXYzine HCL (ATARAX) tablet 25 mg  25 mg Oral Q6H PRN Max 4/day   • lidocaine (LIDODERM) 5 % patch 1 patch  1 patch Topical Daily   • loratadine (CLARITIN) tablet 10 mg  10 mg Oral Daily   • magnesium hydroxide (MILK OF MAGNESIA) oral suspension 30 mL  30 mL Oral Daily PRN   • methocarbamol (ROBAXIN) tablet 500 mg  500 mg Oral Q6H PRN   • nicotine (NICODERM CQ) 21 mg/24 hr TD 24 hr patch 1 patch  1 patch Transdermal Daily   • nicotine polacrilex (NICORETTE) gum 4 mg  4 mg Oral Q2H PRN   • pantoprazole (PROTONIX) EC tablet 40 mg  40 mg Oral Early Morning   • polyethylene glycol (MIRALAX) packet 17 g  17 g Oral Daily   • propranolol (INDERAL) tablet 10 mg  10 mg Oral Q8H PRN   • senna-docusate sodium (SENOKOT S) 8 6-50 mg per tablet 1 tablet  1 tablet Oral Daily PRN   • senna-docusate sodium (SENOKOT S) 8 6-50 mg per tablet 2 tablet  2 tablet Oral HS   • sorbitol 70 % solution 30 mL  30 mL Oral Q1H PRN   • SUMAtriptan (IMITREX) tablet 50 mg  50 mg Oral Daily PRN   • traZODone (DESYREL) tablet 50 mg  50 mg Oral HS PRN   • venlafaxine (EFFEXOR-XR) 24 hr capsule 150 mg  150 mg Oral Daily       Subjective: Patient was seen for continuation of care  Chart was reviewed and discussed with treatment team      Per nursing, patient had difficulty sleeping  She requested and received both trazodone and Atarax with mild effect  Today, patient was seen and examined at bedside  She said that she had a difficult time sleeping last night but is unsure exactly why  She was "tossing and turning all night long "  She denies adverse effects of her psychiatric medications  She denies any other acute psychiatric concerns at this time other than the aforementioned insomnia  Vitals:  Vitals:    01/01/23 0800   BP: 123/66   Pulse: 88   Resp: 16   Temp: (!) 97 °F (36 1 °C)   SpO2: 96%       Laboratory results:    I have personally reviewed all pertinent laboratory/tests results  Recent Results (from the past 48 hour(s))   ECG 12 lead    Collection Time: 12/30/22 10:21 AM   Result Value Ref Range    Ventricular Rate 82 BPM    Atrial Rate 82 BPM    DE Interval 164 ms    QRSD Interval 82 ms    QT Interval 430 ms    QTC Interval 502 ms    P Axis 3 degrees    QRS Axis 60 degrees    T Wave Axis -32 degrees   ECG 12 lead    Collection Time: 12/30/22 10:22 AM   Result Value Ref Range    Ventricular Rate 82 BPM    Atrial Rate 82 BPM    DE Interval 166 ms    QRSD Interval 82 ms    QT Interval 430 ms    QTC Interval 502 ms    P Axis 2 degrees    QRS Axis 62 degrees    T Wave Axis -32 degrees        Current Medications:  Current medications as per above  All medications have been reviewed     Risks, benefits, alternatives, and possible side effects of patient's psychiatric medications were discussed with patient  Mental Status Evaluation:  Appearance: moderately kempt  Motor: +psychomotor agitation  Behavior: cooperative, pleasant  Speech: normal rate, rhythm, and volume  Mood: "tired"  Affect: mood-congruent, anxious appearing  Thought Process: organized and linear  Thought Content: denies auditory or visual hallucinations  Perception: denies delusions or other perceptual disturbances  Risk Potential: denies suicidal ideation, plan, or intent  Denies homicidal ideation  Sensorium: Oriented to person, place, time, and situation  Cognition: cognitive ability appears intact but was not quantitatively tested  Consciousness: alert and awake  Attention: currently intact  Insight: fair  Judgement: fair      Progress Toward Goals & Illness Status: Patient is not at goal  They are psychiatrically unstable and are not yet ready for discharge  The patient's condition currently requires active psychopharmacological medication management, interdisciplinary coordination with case management, and the utilization of adjunctive milieu and group therapy to augment psychopharmacological efficacy  The patient's risk of morbidity, and progression or decompensation of psychiatric disease, is high without this current treatment  This note has been constructed using a voice recognition system  There may be translation, syntax, or grammatical errors  If you have any questions, please contact the dictating provider

## 2023-01-02 RX ADMIN — GLYCOPYRROLATE 1 MG: 1 TABLET ORAL at 08:18

## 2023-01-02 RX ADMIN — POLYETHYLENE GLYCOL 3350 17 G: 17 POWDER, FOR SOLUTION ORAL at 08:17

## 2023-01-02 RX ADMIN — NICOTINE 1 PATCH: 21 PATCH, EXTENDED RELEASE TRANSDERMAL at 08:21

## 2023-01-02 RX ADMIN — DOCUSATE SODIUM 100 MG: 100 CAPSULE, LIQUID FILLED ORAL at 08:19

## 2023-01-02 RX ADMIN — DOCUSATE SODIUM 100 MG: 100 CAPSULE, LIQUID FILLED ORAL at 17:06

## 2023-01-02 RX ADMIN — CHOLECALCIFEROL TAB 25 MCG (1000 UNIT) 1000 UNITS: 25 TAB at 08:18

## 2023-01-02 RX ADMIN — HALOPERIDOL 7.5 MG: 5 TABLET ORAL at 17:04

## 2023-01-02 RX ADMIN — FENOFIBRATE 145 MG: 145 TABLET, COATED ORAL at 08:18

## 2023-01-02 RX ADMIN — HALOPERIDOL 7.5 MG: 5 TABLET ORAL at 08:18

## 2023-01-02 RX ADMIN — VENLAFAXINE HYDROCHLORIDE 150 MG: 150 CAPSULE, EXTENDED RELEASE ORAL at 08:18

## 2023-01-02 RX ADMIN — LORATADINE 10 MG: 10 TABLET ORAL at 08:18

## 2023-01-02 RX ADMIN — GLYCOPYRROLATE 1 MG: 1 TABLET ORAL at 17:04

## 2023-01-02 RX ADMIN — SENNOSIDES AND DOCUSATE SODIUM 2 TABLET: 50; 8.6 TABLET ORAL at 21:16

## 2023-01-02 RX ADMIN — LIDOCAINE 1 PATCH: 50 PATCH CUTANEOUS at 08:21

## 2023-01-02 RX ADMIN — PANTOPRAZOLE SODIUM 40 MG: 40 TABLET, DELAYED RELEASE ORAL at 06:20

## 2023-01-02 RX ADMIN — CLOZAPINE 300 MG: 100 TABLET ORAL at 21:15

## 2023-01-02 RX ADMIN — FLUTICASONE PROPIONATE 1 SPRAY: 50 SPRAY, METERED NASAL at 17:04

## 2023-01-02 RX ADMIN — CLOZAPINE 100 MG: 100 TABLET ORAL at 13:40

## 2023-01-02 RX ADMIN — CLOZAPINE 100 MG: 100 TABLET ORAL at 08:18

## 2023-01-02 NOTE — PROGRESS NOTES
Progress Note - Behavioral Health   Tatyana Phillip 47 y o  female MRN: [id number]  Unit/Bed#: [facility] 377-01 Encounter: [id number]       Patient was visited on unit for continuing care; chart reviewed and discussed with the nursing staff  On approach, the patient was calm and cooperative  Reported feeling tired this morning, but as per patient slept better last night  Denied any changes in her appetite, and mood  Reported "chattering" AH and VH as seeing "shadows"  Denied SI/HI, intent or plan upon direct inquiry at this time  Patient continues to be withdrawn with minimal interactions with staff and peers  No reports of aggression or self-injurious behavior on unit  No PRN medications used in the past 24 hours except Trazodone 50 mg at [address]  [place] Patient accepted all offered medications and reported feeling better  No adverse effects of medications noted or reported        Current Mental Status Evaluation:  Appearance and attitude: appeared as stated age, dressed in hospital attire, with good hygiene  Eye contact: fair  Motor Function: within normal limits, No PMA/PMR  Gait/station: Not observed  Speech: normal for rate, rhythm, volume, and latency with decreased amount  Language: No overt abnormality  Mood/affect: "good" / Affect was constricted, mood-congruent  Thought Processes: linear  Thought content: denied suicidal ideations or homicidal ideations, no overt delusions elicited  Associations: concrete associations  Perceptual disturbances: auditory hallucinations, vague visual hallucinations  Orientation: oriented to time, person, place and to the situational context  Cognitive Function: intact  Memory: recent and remote memory grossly intact  Intellect: unable to assess  Fund of knowledge: aware of current events, aware of past history and vocabulary average  Impulse control: good  Insight/judgment: fair/fair    Vital signs in last 24 hours:    Temp:  [96 9 °F (36 1 °C)-97 4 °F (36 3 °C)] 96 9 °F (36 1 °C)  HR: [105-110] 105  Resp:  [16] 16  BP: (122-140)/(72-88) 122/72    Laboratory results: I have personally reviewed all pertinent laboratory/tests results    Results from the past 24 hours: No results found for this or any previous visit (from the past 24 hour(s))  Progress Toward Goals: slight improvement    Assessment:  Principal Problem:    Schizoaffective disorder, bipolar type (HCC)  Active Problems:    LYNN (generalized anxiety disorder)    Hyperlipidemia    Post-traumatic stress disorder, chronic    Mild intermittent asthma without complication    Gastroesophageal reflux disease    Vitamin D deficiency    Dependence on nicotine from cigarettes    Mild neurocognitive disorder    Medical clearance for psychiatric admission    Non-seasonal allergic rhinitis    Chronic midline low back pain without sciatica        Plan:  - f/u SLIM recs regarding the medical problems  - Continue medication titration and treatment plan; adjust medication to optimize treatment response and as clinically indicated       Scheduled medications:  Current Facility-Administered Medications   Medication Dose Route Frequency Provider Last Rate   • acetaminophen  650 mg Oral Q6H PRN Charmayne Andreas, MD     • acetaminophen  650 mg Oral Q4H PRN Charmayne Andreas, MD     • acetaminophen  975 mg Oral Q6H PRN Charmayne Andreas, MD     • albuterol  2 puff Inhalation Q6H PRN Adelaida Yanez MD     • aluminum-magnesium hydroxide-simethicone  30 mL Oral Q4H PRN Tacho Block DO     • haloperidol lactate  2 5 mg Intramuscular Q6H PRN Max 4/day Costsudhakar Purdy DO      And   • LORazepam  1 mg Intramuscular Q6H PRN Max 4/day Costsudhakar Purdy DO      And   • benztropine  0 5 mg Intramuscular Q6H PRN Max 4/day Yvonne Purdy DO     • benztropine  1 mg Intramuscular Q4H PRN Max 6/day Charmayne Andreas, MD     • haloperidol lactate  5 mg Intramuscular Q4H PRN Max 4/day Costella Rajwinder DO      And   • LORazepam  2 mg Intramuscular Q4H PRN Max 4/day Jennifer Hernandez DO      And   • benztropine  1 mg Intramuscular Q4H PRN Max 4/day Jennifer Hernandez DO     • benztropine  1 mg Oral Q4H PRN Max 6/day Diane Varghese MD     • calcium carbonate  500 mg Oral Daily PRN Ben Davenport DO     • cholecalciferol  1,000 Units Oral Daily Diane Varghese MD     • cloZAPine  100 mg Oral After Lunch Gris Cook MD     • cloZAPine  100 mg Oral Daily Gris Cook MD     • cloZAPine  300 mg Oral HS Jennifer Hernandez, DO     • hydrOXYzine HCL  50 mg Oral Q6H PRN Max 4/day Diane Varghese MD      Or   • diphenhydrAMINE  50 mg Intramuscular Q6H PRN Diane Varghese MD     • docusate sodium  100 mg Oral BID Diane Varghese MD     • fenofibrate  145 mg Oral Daily Diane Varghese MD     • fluticasone  1 spray Each Nare BID Diane Varghese MD     • glycerin-hypromellose-  1 drop Both Eyes Q3H PRN Diane Varghese MD     • glycopyrrolate  1 mg Oral BID Gris Cook MD     • haloperidol  2 mg Oral Q4H PRN Max 6/day Jennifer Hernandez, DO     • haloperidol  5 mg Oral Q6H PRN Max 4/day Jennifer Hernandez, DO     • haloperidol  5 mg Oral Q4H PRN Max 4/day Jennifer Hernandez, DO     • haloperidol  7 5 mg Oral BID Jennifer Hernandez, DO     • hydrocortisone   Topical 4x Daily PRN Carolyne Bosworth, PA-C     • hydrocortisone   Topical 4x Daily PRN Carolyne Bosworth, PA-C     • hydrOXYzine HCL  100 mg Oral Q6H PRN Max 4/day Diane Varghese MD      Or   • LORazepam  2 mg Intramuscular Q6H PRN Diane Varghese MD     • hydrOXYzine HCL  25 mg Oral Q6H PRN Max 4/day Diane Varghese MD     • lidocaine  1 patch Topical Daily Diane Varghese MD     • loratadine  10 mg Oral Daily Daine Varghese MD     • magnesium hydroxide  30 mL Oral Daily PRN Carolyne Bosworth, PA-C     • methocarbamol  500 mg Oral Q6H PRN Diane Varghese MD     • nicotine  1 patch Transdermal Daily Diane Varghese MD     • nicotine polacrilex  4 mg Oral Q2H PRN Cristal Treadwell MD     • ondansetron  4 mg Oral Q6H PRN John [facility] Block, DO     • pantoprazole  40 mg Oral Early Morning Cristal Treadwell MD     • polyethylene glycol  17 g Oral Daily Suzanna Artis MD     • propranolol  10 mg Oral Q8H PRN Cristal Treadwell MD     • senna-docusate sodium  1 tablet Oral Daily PRN Cristal Treadwell MD     • senna-docusate sodium  2 tablet Oral HS Suzanna Artis MD     • sorbitol  30 mL Oral Q1H PRN Cristal Treadwell MD     • SUMAtriptan  50 mg Oral Daily PRN JARED Winston     • traZODone  50 mg Oral HS PRN Cristal Treadwell MD     • venlafaxine  150 mg Oral Daily Cristal Treadwell MD          PRN:  •  acetaminophen  •  acetaminophen  •  acetaminophen  •  albuterol  •  aluminum-magnesium hydroxide-simethicone  •  haloperidol lactate **AND** LORazepam **AND** benztropine  •  benztropine  •  haloperidol lactate **AND** LORazepam **AND** benztropine  •  benztropine  •  calcium carbonate  •  hydrOXYzine HCL **OR** diphenhydrAMINE  •  glycerin-hypromellose-  •  haloperidol  •  haloperidol  •  haloperidol  •  hydrocortisone  •  hydrocortisone  •  hydrOXYzine HCL **OR** LORazepam  •  hydrOXYzine HCL  •  magnesium hydroxide  •  methocarbamol  •  nicotine polacrilex  •  ondansetron  •  propranolol  •  senna-docusate sodium  •  sorbitol  •  SUMAtriptan  •  traZODone    - Observation: routine    - VS: as per unit protocol  - Diet: Regular diet  - Psychoeducation (benefits and potential risks) discussed, importance of compliance with the psychiatric treatment reiterated, and the patient verbalized understanding of the matter  - Encourage group attendance and milieu therapy    - The pt was educated and agreed to verbalize any suicidal thoughts, frustrations or concerns to the nursing staff, immediately      - Dispo: TBD       Next of Kin  · Extended Emergency Contact Information  · Primary Emergency Contact: Tisha Segundo  · Mobile Phone: [phone]  · Relation:   · Secondary Emergency Contact: Marielle Wylie  · Work Phone: [phone]  · Relation:       Counseling / Coordination of Care     Total floor / unit time spent today 20 minutes  Greater than 50% of total time was spent with the patient and / or family counseling and / or coordination of care  A description of the counseling / coordination of care  Patient's Rights, confidentiality and exceptions to confidentiality, use of automated medical record, [address] [place] [address] staff access to medical record, and consent to treatment reviewed      Urban Pulliam MD  Attending P CHERYL [address]

## 2023-01-03 LAB
ATRIAL RATE: 107 BPM
BACTERIA UR QL AUTO: ABNORMAL /HPF
BILIRUB UR QL STRIP: NEGATIVE
CLARITY UR: CLEAR
COLOR UR: ABNORMAL
GLUCOSE UR STRIP-MCNC: NEGATIVE MG/DL
HGB UR QL STRIP.AUTO: NEGATIVE
KETONES UR STRIP-MCNC: NEGATIVE MG/DL
LEUKOCYTE ESTERASE UR QL STRIP: NEGATIVE
NITRITE UR QL STRIP: NEGATIVE
NON-SQ EPI CELLS URNS QL MICRO: ABNORMAL /HPF
P AXIS: 56 DEGREES
PH UR STRIP.AUTO: 6.5 [PH]
PR INTERVAL: 158 MS
PROT UR STRIP-MCNC: NEGATIVE MG/DL
QRS AXIS: 1 DEGREES
QRSD INTERVAL: 76 MS
QT INTERVAL: 362 MS
QTC INTERVAL: 483 MS
RBC #/AREA URNS AUTO: ABNORMAL /HPF
SP GR UR STRIP.AUTO: 1.01 (ref 1–1.04)
T WAVE AXIS: 93 DEGREES
UROBILINOGEN UA: NEGATIVE MG/DL
VENTRICULAR RATE: 107 BPM
WBC #/AREA URNS AUTO: ABNORMAL /HPF

## 2023-01-03 RX ORDER — BUSPIRONE HYDROCHLORIDE 5 MG/1
5 TABLET ORAL 2 TIMES DAILY
Status: DISCONTINUED | OUTPATIENT
Start: 2023-01-03 | End: 2023-01-03

## 2023-01-03 RX ADMIN — CLOZAPINE 300 MG: 100 TABLET ORAL at 21:32

## 2023-01-03 RX ADMIN — NICOTINE 1 PATCH: 21 PATCH, EXTENDED RELEASE TRANSDERMAL at 08:14

## 2023-01-03 RX ADMIN — POLYETHYLENE GLYCOL 3350 17 G: 17 POWDER, FOR SOLUTION ORAL at 08:16

## 2023-01-03 RX ADMIN — HALOPERIDOL 5 MG: 5 TABLET ORAL at 10:25

## 2023-01-03 RX ADMIN — PANTOPRAZOLE SODIUM 40 MG: 40 TABLET, DELAYED RELEASE ORAL at 06:04

## 2023-01-03 RX ADMIN — CLOZAPINE 100 MG: 100 TABLET ORAL at 14:50

## 2023-01-03 RX ADMIN — HYDROXYZINE HYDROCHLORIDE 100 MG: 50 TABLET ORAL at 10:25

## 2023-01-03 RX ADMIN — FLUTICASONE PROPIONATE 1 SPRAY: 50 SPRAY, METERED NASAL at 08:12

## 2023-01-03 RX ADMIN — VENLAFAXINE HYDROCHLORIDE 150 MG: 150 CAPSULE, EXTENDED RELEASE ORAL at 08:13

## 2023-01-03 RX ADMIN — HALOPERIDOL 5 MG: 5 TABLET ORAL at 15:43

## 2023-01-03 RX ADMIN — SENNOSIDES AND DOCUSATE SODIUM 2 TABLET: 50; 8.6 TABLET ORAL at 21:33

## 2023-01-03 RX ADMIN — DOCUSATE SODIUM 100 MG: 100 CAPSULE, LIQUID FILLED ORAL at 17:11

## 2023-01-03 RX ADMIN — HALOPERIDOL 7.5 MG: 5 TABLET ORAL at 17:30

## 2023-01-03 RX ADMIN — CLOZAPINE 100 MG: 100 TABLET ORAL at 08:14

## 2023-01-03 RX ADMIN — LIDOCAINE 1 PATCH: 50 PATCH CUTANEOUS at 08:13

## 2023-01-03 RX ADMIN — GLYCOPYRROLATE 1 MG: 1 TABLET ORAL at 17:12

## 2023-01-03 RX ADMIN — HALOPERIDOL 7.5 MG: 5 TABLET ORAL at 08:13

## 2023-01-03 RX ADMIN — CHOLECALCIFEROL TAB 25 MCG (1000 UNIT) 1000 UNITS: 25 TAB at 08:13

## 2023-01-03 RX ADMIN — FENOFIBRATE 145 MG: 145 TABLET, COATED ORAL at 08:14

## 2023-01-03 RX ADMIN — GLYCOPYRROLATE 1 MG: 1 TABLET ORAL at 08:13

## 2023-01-03 RX ADMIN — LORATADINE 10 MG: 10 TABLET ORAL at 08:13

## 2023-01-03 RX ADMIN — DOCUSATE SODIUM 100 MG: 100 CAPSULE, LIQUID FILLED ORAL at 08:14

## 2023-01-03 NOTE — PROGRESS NOTES
01/03/23 0845   Team Meeting   Meeting Type Daily Rounds   Team Members Present   Team Members Present Physician;Nurse;   Physician Team Member Kade Naranjo and Elif Yusuf Team Member TOMMY BANERJEE [facility] Management Team Member Leeanna   Patient/Family Present   Patient Present No   Patient's Family Present No     Pt remains paranoid, delusional and reports AVH  Compliant with meds and meals  Continue ECT treatments and med management  Discharge to be determined  Continue to monitor  Discharge to be determined

## 2023-01-03 NOTE — PROGRESS NOTES
Progress Note - Behavioral Health   Marilou Torres 47 y o  female MRN: [id number]  Unit/Bed#: [facility] 377-01 Encounter: [id number]    Assessment/Plan   Principal Problem:    Schizoaffective disorder, bipolar type ([address] )  Active Problems:    LYNN (generalized anxiety disorder)    Hyperlipidemia    Post-traumatic stress disorder, chronic    Mild intermittent asthma without complication    Gastroesophageal reflux disease    Vitamin D deficiency    Dependence on nicotine from cigarettes    Mild neurocognitive disorder    Medical clearance for psychiatric admission    Non-seasonal allergic rhinitis    Chronic midline low back pain without sciatica      Recommended Treatment:   No psychopharmacologic changes necessary at this moment; will continue to assess daily for further optimization  Patient states she was on buspar previously and this caused severe SI  Will consider other anxiety options for patient and discuss with her tomorrow  Discontinued Buspar  Follow-up on UA  EKG QTC WNL today    Continue with pharmacotherapy, group therapy, milieu therapy and occupational therapy  Continue to assess for adverse medication side effects  Encourage Gerry Lamb to participate in nonverbal forms of therapy including journaling and art/music therapy  Continue frequent safety checks and vitals per unit protocol  Continue to engage CM/SW to assist with collateral, disposition planning, and the implementation of an individualized, patient-centered plan of care  Continue medical management by medical team   Case discussed with treatment team     Legal Status: 201  ------------------------------------------------------------    Subjective: All documentation including nursing notes, medication history to ensure medication adherence on the unit, labs, and vitals were reviewed  Gerry Medrano was evaluated this morning for continuity of care and no acute distress noted throughout the evaluation   Over the past 24 hours per nursing report, Gerry Medrano has been cooperative on the unit and compliant with medications  Patient was noted to have an episode of urinary incontinence over the weekend  Discussed with patient and she states she has had urinary leakage for many years  Review of record indicates that the first time this was documented was back in 2010  This is not a new issue and very unlikely related to Clozaril but we will continue to monitor  Today, Dana Dupree is consenting for safety on the unit  Dana Dupree reports feeling "nervous " Dana Dupree notes having good sleep  Dana Dupree states having a good appetite  Dana Dupree has been taking the medications as prescribed and reporting no side effects  Patient appears visibly anxious again today  Patient states that she would be willing to stay for ECT if she knew her rent was being taken care of, offered reassurance, discussed with  to continue to reassure patient that her rent is being taken care of  Patient discusses delusional content at length today discussing people who want to "hypnotize her" but she does not know why and she does not know who these people are  Discussed EKG with her as well as urinalysis and reasons for these tests  Discussed olfactory hallucinations with patient and she states that the bathroom smelled like "drugs"  She states the smell is "spooky" and is unable to give other descriptions of the smell except to state that is most similar to something she has smelled before with regards to drugs  When asked if she has melted after the weekend, she states that she has not smelled that "yet"  It is clear that the reassurance that there are no drugs is not adequate  When asked to expand upon what yet means in this context patient states that she does has not noticed "yet"  Dana Dupree denies suicidal ideations  Dana Ely denies homicidal ideations  Regarding hallucinations, Dana Dupree denies but indicates that she hears and sees things frequently    Appears to be somewhat internally preoccupied but is unclear if this is mostly due to her delusions or actively hallucinating  PRNs overnight: Haldol this a m  for agitation, Atarax 100 this a m  for anxiety  VS: Reviewed, Clinically obese, otherwise within normal limits    Progress Toward Goals: slow improvement    Psychiatric Review of Systems:  Behavior over the last 24 hours:  regressed  Sleep: normal  Appetite: normal  Medication side effects: No   ROS: all other systems are negative    Vital signs in last 24 hours:  Temp:  [97 °F (36 1 °C)-97 1 °F (36 2 °C)] 97 1 °F (36 2 °C)  HR:  [101-104] 104  Resp:  [16] 16  BP: (113-127)/(67-82) 113/67    Laboratory results:  I have personally reviewed all pertinent laboratory/tests results  No results found for this or any previous visit (from the past 48 hour(s))        Mental Status Evaluation:    Appearance:  casually dressed, marginal hygiene, overweight   Behavior:  cooperative, guarded, Anxious   Speech:  normal rate and volume   Mood:  "Nervous"   Affect:  constricted, Anxious   Thought Process:  Disorganized   Associations: loose associations   Thought Content:  paranoid, bizarre, somatic and persecutory delusions, thought insertion   Perceptual Disturbances: Denies auditory or visual hallucinations and Internally preoccupied   Risk Potential: Suicidal ideation - None at present  Homicidal ideation - None at present  Potential for aggression - Not at present   Sensorium:  oriented to person, place and time/date   Memory:  recent and remote memory grossly intact   Consciousness:  alert and awake   Attention/Concentration: attention span and concentration are age appropriate   Insight:  limited   Judgment: limited   Gait/Station: normal gait/station   Motor Activity: no abnormal movements       Current Medications:  Current Facility-Administered Medications   Medication Dose Route Frequency Provider Last Rate   • acetaminophen  650 mg Oral Q6H PRN Tasia Bustos MD     • acetaminophen 650 mg Oral Q4H PRN Jody Gerard MD     • acetaminophen  975 mg Oral Q6H PRN Jody Gerard MD     • albuterol  2 puff Inhalation Q6H PRN Ebony Denise MD     • aluminum-magnesium hydroxide-simethicone  30 mL Oral Q4H PRN [facility], DO     • haloperidol lactate  2 5 mg Intramuscular Q6H PRN Max 4/day Theotis Pintos, DO      And   • LORazepam  1 mg Intramuscular Q6H PRN Max 4/day Theotis Pintos, DO      And   • benztropine  0 5 mg Intramuscular Q6H PRN Max 4/day Theotis Pintos, DO     • benztropine  1 mg Intramuscular Q4H PRN Max 6/day Jody Gerard MD     • haloperidol lactate  5 mg Intramuscular Q4H PRN Max 4/day Theotis Pintos, DO      And   • LORazepam  2 mg Intramuscular Q4H PRN Max 4/day Theotis Pintos, DO      And   • benztropine  1 mg Intramuscular Q4H PRN Max 4/day Theotis Pintos, DO     • benztropine  1 mg Oral Q4H PRN Max 6/day Jody Gerard MD     • calcium carbonate  500 mg Oral Daily PRN Julien Davenport DO     • cholecalciferol  1,000 Units Oral Daily Jody Gerard MD     • cloZAPine  100 mg Oral After Lunch Ebony Denise MD     • cloZAPine  100 mg Oral Daily Ebony Denise MD     • cloZAPine  300 mg Oral HS Theotis Pintos, DO     • hydrOXYzine HCL  50 mg Oral Q6H PRN Max 4/day Jody Gerard MD      Or   • diphenhydrAMINE  50 mg Intramuscular Q6H PRN Jody Gerard MD     • docusate sodium  100 mg Oral BID Jody Gerard MD     • fenofibrate  145 mg Oral Daily Jody Gerard MD     • fluticasone  1 spray Each Nare BID Jody Gerard MD     • glycerin-hypromellose-  1 drop Both Eyes Q3H PRN Jody Gerard MD     • glycopyrrolate  1 mg Oral BID Ebony Denise MD     • haloperidol  2 mg Oral Q4H PRN Max 6/day Theotis Pintos, DO     • haloperidol  5 mg Oral Q6H PRN Max 4/day Theotis Pintos, DO     • haloperidol  5 mg Oral Q4H PRN Max 4/day Theotis Pintos, DO     • haloperidol  7 5 mg Oral BID Jamari Litter, DO     • hydrocortisone   Topical 4x Daily PRN Jigna Ribeiro PA-C     • hydrocortisone   Topical 4x Daily PRN Jigna Ribeiro PA-C     • hydrOXYzine HCL  100 mg Oral Q6H PRN Max 4/day Sada Dolan MD      Or   • LORazepam  2 mg Intramuscular Q6H PRN Sada Dolan MD     • hydrOXYzine HCL  25 mg Oral Q6H PRN Max 4/day Sada Dolan MD     • lidocaine  1 patch Topical Daily Sada Dolan MD     • loratadine  10 mg Oral Daily Sada Dolan MD     • magnesium hydroxide  30 mL Oral Daily PRN Jigna Ribeiro PA-C     • methocarbamol  500 mg Oral Q6H PRN Sada Dolan MD     • nicotine  1 patch Transdermal Daily Sada Dolan MD     • nicotine polacrilex  4 mg Oral Q2H PRN Sada Dolan MD     • ondansetron  4 mg Oral Q6H PRN Darline Oppenheim, DO     • pantoprazole  40 mg Oral Early Morning Sada Dolan MD     • polyethylene glycol  17 g Oral Daily Saman Flower MD     • propranolol  10 mg Oral Q8H PRN Sada Dolan MD     • senna-docusate sodium  1 tablet Oral Daily PRN Sada Dolan MD     • senna-docusate sodium  2 tablet Oral HS Saman Flower MD     • sorbitol  30 mL Oral Q1H PRN Sada Dolan MD     • SUMAtriptan  50 mg Oral Daily PRN JARED Kirkland     • traZODone  50 mg Oral HS PRN Sada Dolan MD     • venlafaxine  150 mg Oral Daily Sada Dolan MD         Suicide/Homicide Risk Assessment:  Risk of Harm to Self:   Based on today's assessment, Corene Smoker presents the following risk of harm to self: high    Risk of Harm to Others:  Based on today's assessment, Corene Smoker presents the following risk of harm to others: high    The following interventions are recommended: behavioral checks every 7 minutes, continued hospitalization on locked unit    Behavioral Health Medications: All current active meds have been reviewed   Changes as in plan section above   Risks, benefits and possible side effects of Medications:   Risks, benefits, and possible side effects of medications explained to patient and patient verbalizes understanding  Counseling / Coordination of Care:  Patient's progress discussed with staff in treatment team meeting  Medications, treatment progress and treatment plan reviewed with patient  Sirisha Gay DO 01/03/23  Psychiatry Resident, PGY-II    This note was completed in part utilizing Dragon dictation Software  Grammatical, translation, syntax errors, random word insertions, spelling mistakes, and incomplete sentences may be an occasional consequence of this system secondary to software limitations with voice recognition, ambient noise, and hardware issues  If you have any questions or concerns about the content, text, or information contained within the body of this dictation, please contact the provider for clarification

## 2023-01-03 NOTE — PROGRESS NOTES
[facility] Group Note     01/03/23 1315   Activity/Group Checklist   Group Life Skills  ([employer])   Attendance Attended   Attendance Duration (min) 0-15  (arrived at the end of group)   Interactions Did not interact   Affect/Mood Calm   Goals Achieved Able to listen to others  (received resources/benefited from social presence of group)

## 2023-01-04 ENCOUNTER — APPOINTMENT (INPATIENT)
Dept: PREOP/PACU | Facility: HOSPITAL | Age: 53
End: 2023-01-04
Attending: PSYCHIATRY & NEUROLOGY

## 2023-01-04 ENCOUNTER — ANESTHESIA (INPATIENT)
Dept: PREOP/PACU | Facility: HOSPITAL | Age: 53
End: 2023-01-04

## 2023-01-04 ENCOUNTER — ANESTHESIA EVENT (INPATIENT)
Dept: PREOP/PACU | Facility: HOSPITAL | Age: 53
End: 2023-01-04

## 2023-01-04 PROCEDURE — GZB4ZZZ OTHER ELECTROCONVULSIVE THERAPY: ICD-10-PCS | Performed by: PSYCHIATRY & NEUROLOGY

## 2023-01-04 RX ORDER — MIDAZOLAM HYDROCHLORIDE 2 MG/2ML
INJECTION, SOLUTION INTRAMUSCULAR; INTRAVENOUS AS NEEDED
Status: DISCONTINUED | OUTPATIENT
Start: 2023-01-04 | End: 2023-01-04

## 2023-01-04 RX ORDER — SODIUM CHLORIDE 9 MG/ML
INJECTION, SOLUTION INTRAVENOUS CONTINUOUS PRN
Status: DISCONTINUED | OUTPATIENT
Start: 2023-01-04 | End: 2023-01-04

## 2023-01-04 RX ORDER — LABETALOL HYDROCHLORIDE 5 MG/ML
INJECTION, SOLUTION INTRAVENOUS AS NEEDED
Status: DISCONTINUED | OUTPATIENT
Start: 2023-01-04 | End: 2023-01-04

## 2023-01-04 RX ORDER — HALOPERIDOL 0.5 MG/1
0.5 TABLET ORAL
Status: DISCONTINUED | OUTPATIENT
Start: 2023-01-04 | End: 2023-01-11 | Stop reason: HOSPADM

## 2023-01-04 RX ORDER — SUCCINYLCHOLINE/SOD CL,ISO/PF 100 MG/5ML
SYRINGE (ML) INTRAVENOUS AS NEEDED
Status: DISCONTINUED | OUTPATIENT
Start: 2023-01-04 | End: 2023-01-04

## 2023-01-04 RX ORDER — HALOPERIDOL 10 MG/1
10 TABLET ORAL 2 TIMES DAILY
Status: DISCONTINUED | OUTPATIENT
Start: 2023-01-04 | End: 2023-01-09

## 2023-01-04 RX ORDER — HALOPERIDOL 1 MG/1
1 TABLET ORAL 2 TIMES DAILY PRN
Status: DISCONTINUED | OUTPATIENT
Start: 2023-01-04 | End: 2023-01-11 | Stop reason: HOSPADM

## 2023-01-04 RX ORDER — HALOPERIDOL 1 MG/1
2 TABLET ORAL DAILY PRN
Status: DISCONTINUED | OUTPATIENT
Start: 2023-01-04 | End: 2023-01-11 | Stop reason: HOSPADM

## 2023-01-04 RX ORDER — GLYCOPYRROLATE 0.2 MG/ML
INJECTION INTRAMUSCULAR; INTRAVENOUS AS NEEDED
Status: DISCONTINUED | OUTPATIENT
Start: 2023-01-04 | End: 2023-01-04

## 2023-01-04 RX ORDER — ESMOLOL HYDROCHLORIDE 10 MG/ML
INJECTION INTRAVENOUS AS NEEDED
Status: DISCONTINUED | OUTPATIENT
Start: 2023-01-04 | End: 2023-01-04

## 2023-01-04 RX ADMIN — VENLAFAXINE HYDROCHLORIDE 150 MG: 150 CAPSULE, EXTENDED RELEASE ORAL at 08:16

## 2023-01-04 RX ADMIN — METHOHEXITAL SODIUM 100 MG: 500 INJECTION, POWDER, LYOPHILIZED, FOR SOLUTION INTRAMUSCULAR; INTRAVENOUS; RECTAL at 06:21

## 2023-01-04 RX ADMIN — GLYCOPYRROLATE 0.2 MG: 0.2 INJECTION, SOLUTION INTRAMUSCULAR; INTRAVENOUS at 06:19

## 2023-01-04 RX ADMIN — CHOLECALCIFEROL TAB 25 MCG (1000 UNIT) 1000 UNITS: 25 TAB at 08:16

## 2023-01-04 RX ADMIN — HALOPERIDOL 10 MG: 10 TABLET ORAL at 17:13

## 2023-01-04 RX ADMIN — PANTOPRAZOLE SODIUM 40 MG: 40 TABLET, DELAYED RELEASE ORAL at 08:14

## 2023-01-04 RX ADMIN — DOCUSATE SODIUM 100 MG: 100 CAPSULE, LIQUID FILLED ORAL at 08:15

## 2023-01-04 RX ADMIN — CLOZAPINE 300 MG: 100 TABLET ORAL at 21:24

## 2023-01-04 RX ADMIN — SENNOSIDES AND DOCUSATE SODIUM 2 TABLET: 50; 8.6 TABLET ORAL at 21:22

## 2023-01-04 RX ADMIN — LABETALOL HYDROCHLORIDE 5 MG: 5 INJECTION, SOLUTION INTRAVENOUS at 06:31

## 2023-01-04 RX ADMIN — ALUMINUM HYDROXIDE, MAGNESIUM HYDROXIDE, AND SIMETHICONE 30 ML: 200; 200; 20 SUSPENSION ORAL at 17:12

## 2023-01-04 RX ADMIN — MIDAZOLAM 2 MG: 1 INJECTION INTRAMUSCULAR; INTRAVENOUS at 06:31

## 2023-01-04 RX ADMIN — FENOFIBRATE 145 MG: 145 TABLET, COATED ORAL at 08:16

## 2023-01-04 RX ADMIN — CLOZAPINE 100 MG: 100 TABLET ORAL at 14:13

## 2023-01-04 RX ADMIN — GLYCOPYRROLATE 1 MG: 1 TABLET ORAL at 17:13

## 2023-01-04 RX ADMIN — POLYETHYLENE GLYCOL 3350 17 G: 17 POWDER, FOR SOLUTION ORAL at 08:16

## 2023-01-04 RX ADMIN — LORATADINE 10 MG: 10 TABLET ORAL at 08:15

## 2023-01-04 RX ADMIN — Medication 100 MG: at 06:22

## 2023-01-04 RX ADMIN — LABETALOL HYDROCHLORIDE 5 MG: 5 INJECTION, SOLUTION INTRAVENOUS at 06:29

## 2023-01-04 RX ADMIN — HALOPERIDOL 7.5 MG: 5 TABLET ORAL at 08:15

## 2023-01-04 RX ADMIN — DOCUSATE SODIUM 100 MG: 100 CAPSULE, LIQUID FILLED ORAL at 17:13

## 2023-01-04 RX ADMIN — LIDOCAINE 1 PATCH: 50 PATCH CUTANEOUS at 08:16

## 2023-01-04 RX ADMIN — NICOTINE 1 PATCH: 21 PATCH, EXTENDED RELEASE TRANSDERMAL at 08:15

## 2023-01-04 RX ADMIN — SODIUM CHLORIDE: 0.9 INJECTION, SOLUTION INTRAVENOUS at 06:19

## 2023-01-04 RX ADMIN — CLOZAPINE 100 MG: 100 TABLET ORAL at 08:15

## 2023-01-04 RX ADMIN — ESMOLOL HYDROCHLORIDE 50 MG: 100 INJECTION, SOLUTION INTRAVENOUS at 06:28

## 2023-01-04 NOTE — PROGRESS NOTES
01/04/23 1330   Activity/Group Checklist   Group Other (Comment)  (Afternoon Check In)   Attendance Attended   Attendance Duration (min) 16-30   Interactions Interacted appropriately   Affect/Mood Appropriate   Goals Achieved Identified feelings; Able to listen to others; Able to engage in interactions

## 2023-01-04 NOTE — PROGRESS NOTES
01/04/23 1100   Activity/Group Checklist   Group   (recovery group-diagnosis)   Attendance Attended   Attendance Duration (min) 46-60   Interactions Interacted appropriately   Affect/Mood Appropriate   Goals Achieved Discussed coping strategies; Discussed self-esteem issues; Able to listen to others; Able to engage in interactions; Able to reflect/comment on own behavior;Able to self-disclose; Able to recieve feedback     Patient started group sitting at another table and then moved and joined the group where she began to share more about herself

## 2023-01-04 NOTE — PROGRESS NOTES
Progress Note - Behavioral Health   Brenda Sandhu 47 y o  female MRN: [id number]  Unit/Bed#: [facility] 377-01 Encounter: [id number]    Assessment/Plan   Principal Problem:    Schizoaffective disorder, bipolar type ([address] )  Active Problems:    LYNN (generalized anxiety disorder)    Hyperlipidemia    Post-traumatic stress disorder, chronic    Mild intermittent asthma without complication    Gastroesophageal reflux disease    Vitamin D deficiency    Dependence on nicotine from cigarettes    Mild neurocognitive disorder    Medical clearance for psychiatric admission    Non-seasonal allergic rhinitis    Chronic midline low back pain without sciatica      Recommended Treatment:   No psychopharmacologic changes necessary at this moment with the exception of increasing Effexor to 225 mg daily for anxiety, increase haldol 10 mg bid; will continue to assess daily for further optimization  ECT #6 today,  on 1/30/2023, UA unremarkable  Continue with pharmacotherapy, group therapy, milieu therapy and occupational therapy  Continue to assess for adverse medication side effects  Encourage Madonna Driverorquidea Deseannila to participate in nonverbal forms of therapy including journaling and art/music therapy  Continue frequent safety checks and vitals per unit protocol  Continue to engage CM/SW to assist with collateral, disposition planning, and the implementation of an individualized, patient-centered plan of care  Continue medical management by medical team   Case discussed with treatment team     Legal Status: 201  ------------------------------------------------------------    Subjective: All documentation including nursing notes, medication history to ensure medication adherence on the unit, labs, and vitals were reviewed  Madonnarey Alexandre was evaluated this morning for continuity of care and no acute distress noted throughout the evaluation   Over the past 24 hours per nursing report, Madonnanila Alexandre has been cooperative on the unit and compliant with medications  Today, Iva Poole is consenting for safety on the unit  Iva Rater reports feeling "nervous " Drvincent Rater notes having good sleep  Drvincent Rater states having a good appetite  Iva Rater has been taking the medications as prescribed and reporting no side effects  Iva Rater denies suicidal ideations  Drvincent Rater denies homicidal ideations  Regarding hallucinations, Iva Rater denies and does not appear internally stimulated at this time  PRNs overnight: Haldol 5 mg yesterday at 1025 and 1543 agitation, Atarax 100 mg at 1025 yesterday for anxiety  VS: Reviewed, Clinically obese, otherwise within normal limits    Progress Toward Goals: slow improvement    Psychiatric Review of Systems:  Behavior over the last 24 hours:  improved  Sleep: normal  Appetite: normal  Medication side effects: No   ROS: all other systems are negative    Vital signs in last 24 hours:  Temp:  [97 1 °F (36 2 °C)-97 6 °F (36 4 °C)] 97 6 °F (36 4 °C)  HR:  [] 96  Resp:  [14-20] 14  BP: (113-130)/(54-89) 120/81    Laboratory results:  I have personally reviewed all pertinent laboratory/tests results    Recent Results (from the past 48 hour(s))   ECG 12 lead    Collection Time: 01/03/23 11:46 AM   Result Value Ref Range    Ventricular Rate 107 BPM    Atrial Rate 107 BPM    MI Interval 158 ms    QRSD Interval 76 ms    QT Interval 362 ms    QTC Interval 483 ms    P Axis 56 degrees    QRS Axis 1 degrees    T Wave Axis 93 degrees   Urinalysis with microscopic    Collection Time: 01/03/23 12:41 PM   Result Value Ref Range    Clarity, UA Clear Clear, Other    Color, UA Straw Straw, Yellow, Pale Yellow    Specific [place], UA 1 010 1 003 - 1 040    pH, UA 6 5 4 5, 5 0, 5 5, 6 0, 6 5, 7 0, 7 5, 8 0    Glucose, UA Negative Negative mg/dl    Ketones, UA Negative Negative mg/dl    Occult Blood, UA Negative Negative    Protein, UA Negative Negative mg/dl    Nitrite, UA Negative Negative    Bilirubin, UA Negative Negative    Leukocytes, UA Negative Negative    WBC, UA 0-1 (A) None Seen, 2-4, 5-60 /hpf    RBC, UA None Seen None Seen, 2-4 /hpf    Bacteria, UA Occasional None Seen, Occasional /hpf    Epithelial Cells Occasional None Seen, Occasional /hpf    UROBILINOGEN UA Negative 1 0, Negative mg/dL         Mental Status Evaluation:    Appearance:  casually dressed, adequate grooming, looks stated age, overweight   Behavior:  cooperative   Speech:  normal rate and volume   Mood:  "nervous"   Affect:  constricted   Thought Process:  less disorganized   Associations: tangential associations   Thought Content:  bizarre delusions, paranoid ideation, negative thinking, ruminations   Perceptual Disturbances: Denies auditory or visual hallucinations and Does not appear to be responding to internal stimuli   Risk Potential: Suicidal ideation - None at present  Homicidal ideation - None at present  Potential for aggression - Not at present   Sensorium:  oriented to person, place and time/date   Memory:  recent and remote memory grossly intact   Consciousness:  alert and awake   Attention/Concentration: attention span and concentration are age appropriate   Insight:  limited   Judgment: limited   Gait/Station: slow gait   Motor Activity: no abnormal movements       Current Medications:  Current Facility-Administered Medications   Medication Dose Route Frequency Provider Last Rate   • acetaminophen  650 mg Oral Q6H PRN Silver Singer MD     • acetaminophen  650 mg Oral Q4H PRN Silver Singer MD     • acetaminophen  975 mg Oral Q6H PRN Silver Singer MD     • albuterol  2 puff Inhalation Q6H PRN Theo Hughes MD     • aluminum-magnesium hydroxide-simethicone  30 mL Oral Q4H PRN Tez Gut Block, DO     • haloperidol lactate  2 5 mg Intramuscular Q6H PRN Max 4/day Jordy Puff, DO      And   • LORazepam  1 mg Intramuscular Q6H PRN Max 4/day Jordy Puff, DO      And   • benztropine  0 5 mg Intramuscular Q6H PRN Max 4/day Jordy Puff, DO     • benztropine  1 mg Intramuscular Q4H PRN Max 6/day Sammie Garza MD     • haloperidol lactate  5 mg Intramuscular Q4H PRN Max 4/day Nelson Pandy, DO      And   • LORazepam  2 mg Intramuscular Q4H PRN Max 4/day Nelson Pandy, DO      And   • benztropine  1 mg Intramuscular Q4H PRN Max 4/day Nelson Pandy, DO     • benztropine  1 mg Oral Q4H PRN Max 6/day Sammie Garza MD     • calcium carbonate  500 mg Oral Daily PRN Stalin Iba Bujdos, DO     • cholecalciferol  1,000 Units Oral Daily Sammie Garza MD     • cloZAPine  100 mg Oral After Lunch Mello Fuller MD     • cloZAPine  100 mg Oral Daily Mello Fuller MD     • cloZAPine  300 mg Oral HS Nelson Pandy, DO     • hydrOXYzine HCL  50 mg Oral Q6H PRN Max 4/day Sammie Garza MD      Or   • diphenhydrAMINE  50 mg Intramuscular Q6H PRN Sammie Garza MD     • docusate sodium  100 mg Oral BID Sammie Garza MD     • fenofibrate  145 mg Oral Daily Sammie Garza MD     • fluticasone  1 spray Each Nare BID Sammie Garza MD     • glycerin-hypromellose-  1 drop Both Eyes Q3H PRN Sammie Garza MD     • glycopyrrolate  1 mg Oral BID eMllo Fuller MD     • haloperidol  2 mg Oral Q4H PRN Max 6/day Nelson Pandy, DO     • haloperidol  5 mg Oral Q6H PRN Max 4/day Nelson Pandy, DO     • haloperidol  5 mg Oral Q4H PRN Max 4/day Nelson Pandy, DO     • haloperidol  7 5 mg Oral BID Nelson Pandy, DO     • hydrocortisone   Topical 4x Daily PRN Rossy Griffith, PA-C     • hydrocortisone   Topical 4x Daily PRN Rossy Griffith PA-C     • hydrOXYzine HCL  100 mg Oral Q6H PRN Max 4/day Sammie Garza MD      Or   • LORazepam  2 mg Intramuscular Q6H PRN Sammie Garza MD     • hydrOXYzine HCL  25 mg Oral Q6H PRN Max 4/day Sammie Garza MD     • lidocaine  1 patch Topical Daily Sammie Garza MD     • loratadine  10 mg Oral Daily Sammie Garza MD     • magnesium hydroxide  30 mL Oral Daily PRN Renetta Lux PA-C     • methocarbamol  500 mg Oral Q6H PRN Leo Gurrola MD     • nicotine  1 patch Transdermal Daily Leo Gurrola MD     • nicotine polacrilex  4 mg Oral Q2H PRN Leo Gurrola MD     • ondansetron  4 mg Oral Q6H PRN Marcelina Block DO     • pantoprazole  40 mg Oral Early Morning Leo Gurrola MD     • polyethylene glycol  17 g Oral Daily Malu Kaplan MD     • propranolol  10 mg Oral Q8H PRN Leo Gurrola MD     • senna-docusate sodium  1 tablet Oral Daily PRN Leo Gurrola MD     • senna-docusate sodium  2 tablet Oral HS Malu aKplan MD     • sorbitol  30 mL Oral Q1H PRN Leo Gurrola MD     • SUMAtriptan  50 mg Oral Daily PRN JARED Kirkland     • traZODone  50 mg Oral HS PRN Leo Gurrola MD     • venlafaxine  150 mg Oral Daily Leo Gurrola MD         Suicide/Homicide Risk Assessment:  Risk of Harm to Self:   Based on today's assessment, Madonna Alexandre presents the following risk of harm to self: high    Risk of Harm to Others:  Based on today's assessment, Madonna Alexandre presents the following risk of harm to others: high    The following interventions are recommended: behavioral checks every 7 minutes, continued hospitalization on locked unit    Behavioral Health Medications: All current active meds have been reviewed  Changes as in plan section above  Risks, benefits and possible side effects of Medications:   Risks, benefits, and possible side effects of medications explained to patient and patient verbalizes understanding  Counseling / Coordination of Care:  Patient's progress discussed with staff in treatment team meeting  Medications, treatment progress and treatment plan reviewed with patient  Tanvi OliveraDO 01/04/23  Psychiatry Resident, PGY-II    This note was completed in part utilizing Dragon dictation Software   Grammatical, translation, syntax errors, random word insertions, spelling mistakes, and incomplete sentences may be an occasional consequence of this system secondary to software limitations with voice recognition, ambient noise, and hardware issues  If you have any questions or concerns about the content, text, or information contained within the body of this dictation, please contact the provider for clarification

## 2023-01-04 NOTE — ANESTHESIA POSTPROCEDURE EVALUATION
Post-Op Assessment Note    CV Status:  Stable  Pain Score: 0    Pain management: adequate     Mental Status:  Alert and awake   Hydration Status:  Euvolemic   PONV Controlled:  Controlled   Airway Patency:  Patent      Post Op Vitals Reviewed: Yes      Staff: Anesthesiologist, CRNA         No notable events documented      BP   125/84   Temp      Pulse  90   Resp   14   SpO2   92

## 2023-01-04 NOTE — PROCEDURES
Procedure Note - ECT  Genny Ramírez 47 y o  female MRN: [id number]    Time out was taken with staff to confirm correct patient and correct procedure to be performed  The patient's mood is improving, she is visibly less tired and less anxious stated she feels less depressed  She admitted having memory problems on the day of the ECT, not remembering what happened on that day, but did not have any biographical memory loss  The patient tolerated this memory lapses well, she was encouraged by the writer at that they are temporary and the ECT does not lead to persistent memory loss for current and future events      Session Number: 006    Diagnosis: Principal Problem:    Schizoaffective disorder, bipolar type ([facility])  Active Problems:    LYNN (generalized anxiety disorder)    Hyperlipidemia    Post-traumatic stress disorder, chronic    Mild intermittent asthma without complication    Gastroesophageal reflux disease    Vitamin D deficiency    Dependence on nicotine from cigarettes    Mild neurocognitive disorder    Medical clearance for psychiatric admission    Non-seasonal allergic rhinitis    Chronic midline low back pain without sciatica      ECT Type: Inpatient, Acute    Anesthesia: Brevital     Electrode Placement: bilateral    Energy level: 65%      Seizure Duration     EE sec  EMG: N/A sec    PSI 86 8%    Results:Clinical seizure was satisfactory, Patient tolerated ECT well, Complications: none    Vitals:    23 0833   BP: 103/75   Pulse: 99   Resp: 16   Temp: (!) 97 2 °F (36 2 °C)   SpO2: 92%        Medication Administration - last 24 hours from 2023 1319 to 2023 1319       Date/Time Order Dose Route Action Action by     2023 0816 EST cholecalciferol (VITAMIN D3) tablet 1,000 Units 1,000 Units Oral Given Joseph Manuel RN     2023 0815 EST docusate sodium (COLACE) capsule 100 mg 100 mg Oral Given Joseph Manuel RN     2023 1711 EST docusate sodium (COLACE) capsule 100 mg 100 mg Oral Given Elina Burroughs, YUDY     01/04/2023 4452 EST fenofibrate (TRICOR) tablet 145 mg 145 mg Oral Given Ra Cage, RN     01/04/2023 1464 EST lidocaine (LIDODERM) 5 % patch 1 patch 1 patch Topical Medication Applied Ra Cage, RN     01/03/2023 2132 EST lidocaine (LIDODERM) 5 % patch 1 patch 1 patch Topical Patch Removed Sinan Gregg RN     01/04/2023 0816 EST fluticasone (FLONASE) 50 mcg/act nasal spray 1 spray 1 spray Each Nare Refused Ra Cage, RN     01/03/2023 1715 EST fluticasone (FLONASE) 50 mcg/act nasal spray 1 spray 1 spray Each Nare Not Given Elina Burroughs, YUDY     01/04/2023 0815 EST loratadine (CLARITIN) tablet 10 mg 10 mg Oral Given Ra Cage, YUDY     01/04/2023 0815 EST nicotine (NICODERM CQ) 21 mg/24 hr TD 24 hr patch 1 patch 1 patch Transdermal Medication Applied Ra Cage, YUDY     01/04/2023 0813 EST nicotine (NICODERM CQ) 21 mg/24 hr TD 24 hr patch 1 patch 1 patch Transdermal Patch Removed Ra Cage, YUDY     01/04/2023 5392 EST pantoprazole (PROTONIX) EC tablet 40 mg 40 mg Oral Given Ra Cage, YUDY     01/04/2023 0816 EST venlafaxine (EFFEXOR-XR) 24 hr capsule 150 mg 150 mg Oral Given Ra Cage RN     01/04/2023 0818 EST glycopyrrolate (ROBINUL) tablet 1 mg 1 mg Oral Not Given Ra Cage RN     01/03/2023 1712 EST glycopyrrolate (ROBINUL) tablet 1 mg 1 mg Oral Given Elina Burroughs, YUDY     01/03/2023 1450 EST cloZAPine (CLOZARIL) tablet 100 mg 100 mg Oral Given Elina Burroughs, YUDY     01/04/2023 0815 EST cloZAPine (CLOZARIL) tablet 100 mg 100 mg Oral Given Ra Cage, YUDY     01/04/2023 0816 EST polyethylene glycol (MIRALAX) packet 17 g 17 g Oral Given Ra Cage, YUDY     01/03/2023 2133 EST senna-docusate sodium (SENOKOT S) 8 6-50 mg per tablet 2 tablet 2 tablet Oral Given Sinan Gregg, RN     01/03/2023 1543 EST haloperidol (HALDOL) tablet 5 mg 5 mg Oral Given Elina Burroughs, YUDY     01/04/2023 0815 EST haloperidol (HALDOL) tablet 7 5 mg 7 5 mg Oral Given Ana Terry, RN     01/03/2023 1730 EST haloperidol (HALDOL) tablet 7 5 mg 7 5 mg Oral Given Mich Mccloud, YUDY     01/03/2023 2132 EST cloZAPine (CLOZARIL) tablet 300 mg 300 mg Oral Given Fabrizio Trejo, YUDY     01/04/2023 1212 EST sodium chloride 0 9 % infusion 0 mL/hr Intravenous Stopped Ramos Rodriguez, YUDY     01/04/2023 3973 EST sodium chloride 0 9 % infusion -- Intravenous Anesthesia Volume Adjustment Juan York CRNA     01/04/2023 7842 EST sodium chloride 0 9 % infusion -- Intravenous New Bag Juan York CRNA           Media Information  Document Information    Clinical Image - Mobile Device      01/04/2023 06:58   Attached To:    Hospital Encounter on 12/14/22     Source Information    Duane Loredo MD  [facility]

## 2023-01-04 NOTE — ANESTHESIA PREPROCEDURE EVALUATION
Procedure:  ECT INPATIENT    Relevant Problems   CARDIO   (+) Hyperlipidemia      GI/HEPATIC   (+) Gastroesophageal reflux disease      MUSCULOSKELETAL   (+) Chronic midline low back pain without sciatica   (+) Degenerative disc disease, lumbar      NEURO/PSYCH   (+) Chronic midline low back pain without sciatica   (+) LYNN (generalized anxiety disorder)   (+) History of alcohol dependence (HCC)   (+) Other headache syndrome   (+) Post-traumatic stress disorder, chronic      PULMONARY   (+) Emphysema lung (HCC)   (+) Mild intermittent asthma without complication        Physical Exam    Airway    Mallampati score: II  TM Distance: >3 FB  Neck ROM: full     Dental       Cardiovascular  Cardiovascular exam normal    Pulmonary  Pulmonary exam normal     Other Findings        Anesthesia Plan  ASA Score- 3     Anesthesia Type- general with ASA Monitors  Additional Monitors:   Airway Plan:           Plan Factors-    Chart reviewed  EKG reviewed  Existing labs reviewed  Induction- intravenous  Postoperative Plan-     Informed Consent- Anesthetic plan and risks discussed with patient  I personally reviewed this patient with the CRNA  Discussed and agreed on the Anesthesia Plan with the CRNA  Jeffrey Guzmán

## 2023-01-05 RX ADMIN — CLOZAPINE 100 MG: 100 TABLET ORAL at 13:46

## 2023-01-05 RX ADMIN — FENOFIBRATE 145 MG: 145 TABLET, COATED ORAL at 08:11

## 2023-01-05 RX ADMIN — DOCUSATE SODIUM 100 MG: 100 CAPSULE, LIQUID FILLED ORAL at 08:11

## 2023-01-05 RX ADMIN — CHOLECALCIFEROL TAB 25 MCG (1000 UNIT) 1000 UNITS: 25 TAB at 08:12

## 2023-01-05 RX ADMIN — FLUTICASONE PROPIONATE 1 SPRAY: 50 SPRAY, METERED NASAL at 08:12

## 2023-01-05 RX ADMIN — DOCUSATE SODIUM 100 MG: 100 CAPSULE, LIQUID FILLED ORAL at 17:13

## 2023-01-05 RX ADMIN — VENLAFAXINE HYDROCHLORIDE 225 MG: 150 CAPSULE, EXTENDED RELEASE ORAL at 08:11

## 2023-01-05 RX ADMIN — POLYETHYLENE GLYCOL 3350 17 G: 17 POWDER, FOR SOLUTION ORAL at 08:12

## 2023-01-05 RX ADMIN — HALOPERIDOL 10 MG: 10 TABLET ORAL at 08:11

## 2023-01-05 RX ADMIN — METHOCARBAMOL 500 MG: 500 TABLET, FILM COATED ORAL at 12:23

## 2023-01-05 RX ADMIN — HALOPERIDOL 10 MG: 10 TABLET ORAL at 17:13

## 2023-01-05 RX ADMIN — CLOZAPINE 100 MG: 100 TABLET ORAL at 08:11

## 2023-01-05 RX ADMIN — CLOZAPINE 300 MG: 100 TABLET ORAL at 21:01

## 2023-01-05 RX ADMIN — SENNOSIDES AND DOCUSATE SODIUM 2 TABLET: 50; 8.6 TABLET ORAL at 21:00

## 2023-01-05 RX ADMIN — LIDOCAINE 1 PATCH: 50 PATCH CUTANEOUS at 08:12

## 2023-01-05 RX ADMIN — NICOTINE 1 PATCH: 21 PATCH, EXTENDED RELEASE TRANSDERMAL at 08:13

## 2023-01-05 RX ADMIN — GLYCOPYRROLATE 1 MG: 1 TABLET ORAL at 17:13

## 2023-01-05 RX ADMIN — GLYCOPYRROLATE 1 MG: 1 TABLET ORAL at 08:11

## 2023-01-05 RX ADMIN — LORATADINE 10 MG: 10 TABLET ORAL at 08:11

## 2023-01-05 RX ADMIN — PANTOPRAZOLE SODIUM 40 MG: 40 TABLET, DELAYED RELEASE ORAL at 06:18

## 2023-01-05 NOTE — PROGRESS NOTES
Progress Note - Behavioral Health   [place] 47 y o  female MRN: [id number]  Unit/Bed#: [facility] 377-01 Encounter: [id number]    Assessment/Plan   Principal Problem:    Schizoaffective disorder, bipolar type ([address] )  Active Problems:    LYNN (generalized anxiety disorder)    Hyperlipidemia    Post-traumatic stress disorder, chronic    Mild intermittent asthma without complication    Gastroesophageal reflux disease    Vitamin D deficiency    Dependence on nicotine from cigarettes    Mild neurocognitive disorder    Medical clearance for psychiatric admission    Non-seasonal allergic rhinitis    Chronic midline low back pain without sciatica      Recommended Treatment:   No psychopharmacologic changes necessary at this moment; will continue to assess daily for further optimization  Continue with pharmacotherapy, group therapy, milieu therapy and occupational therapy  Continue to assess for adverse medication side effects  Encourage Yen Lamb to participate in nonverbal forms of therapy including journaling and art/music therapy  Continue frequent safety checks and vitals per unit protocol  Continue to engage CM/SW to assist with collateral, disposition planning, and the implementation of an individualized, patient-centered plan of care  Continue medical management by medical team   Case discussed with treatment team     Legal Status: 201  ------------------------------------------------------------    Subjective: All documentation including nursing notes, medication history to ensure medication adherence on the unit, labs, and vitals were reviewed  Yen Haney was evaluated this morning for continuity of care and no acute distress noted throughout the evaluation  Over the past 24 hours per nursing report, Yen Haney has been cooperative on the unit and compliant with medications  Today, Yen Haney is consenting for safety on the unit  Barlowmisbah Haney reports feeling "good " Yen Haney notes having good sleep   Yen Haney states having a good appetite  Blanche Lo has been taking the medications as prescribed and reporting no side effects  We discussed her coping skills for SI, discussed lifestyle changes to promote wellbeing    Blanche Lo denies suicidal ideations  [address] homicidal ideations  Regarding hallucinations, Blanche Lo denies and does not appear internally stimulated    Nutrition Assessment and Intervention:     Nutrition-related book recommendations provided to patient      Physical Activity Assessment and Intervention:    Physical Activity Prescription completed with patient      Emotional and Mental Well-being, Sleep, Connectedness Assessment and Intervention:    Counseled regarding sleep hygiene and aspects of healthy sleep    Stress management plan created with patient      Tobacco and Toxic Substance Assessment and Intervention:     Tobacco cessation counseling provided          PRNs overnight: none   VS: Reviewed, clincally obese, otherwise within normal limits    Progress Toward Goals: slow improvement    Psychiatric Review of Systems:  Behavior over the last 24 hours:  improved  Sleep: normal  Appetite: normal  Medication side effects: No   ROS: all other systems are negative    Vital signs in last 24 hours:  Temp:  [97 2 °F (36 2 °C)] 97 2 °F (36 2 °C)  HR:  [89] 89  Resp:  [16] 16  BP: (115)/(83) 115/83    Laboratory results:  I have personally reviewed all pertinent laboratory/tests results    Recent Results (from the past 48 hour(s))   ECG 12 lead    Collection Time: 01/03/23 11:46 AM   Result Value Ref Range    Ventricular Rate 107 BPM    Atrial Rate 107 BPM    WY Interval 158 ms    QRSD Interval 76 ms    QT Interval 362 ms    QTC Interval 483 ms    P Axis 56 degrees    QRS Axis 1 degrees    T Wave Axis 93 degrees   Urinalysis with microscopic    Collection Time: 01/03/23 12:41 PM   Result Value Ref Range    Clarity, UA Clear Clear, Other    Color, UA Straw Straw, Yellow, Pale Yellow    Specific Drew, UA 1 010 1 003 - 1 040 pH, UA 6 5 4 5, 5 0, 5 5, 6 0, 6 5, 7 0, 7 5, 8 0    Glucose, UA Negative Negative mg/dl    Ketones, UA Negative Negative mg/dl    Occult Blood, UA Negative Negative    Protein, UA Negative Negative mg/dl    Nitrite, UA Negative Negative    Bilirubin, UA Negative Negative    Leukocytes, UA Negative Negative    WBC, UA 0-1 (A) None Seen, 2-4, 5-60 /hpf    RBC, UA None Seen None Seen, 2-4 /hpf    Bacteria, UA Occasional None Seen, Occasional /hpf    Epithelial Cells Occasional None Seen, Occasional /hpf    UROBILINOGEN UA Negative 1 0, Negative mg/dL         Mental Status Evaluation:    Appearance:  casually dressed, marginal hygiene, looks stated age, overweight   Behavior:  pleasant, cooperative   Speech:  normal rate and volume   Mood:  "good"   Affect:  brighter   Thought Process:  less disorganized   Associations: tangential associations   Thought Content:  paranoid and bizarre delusions   Perceptual Disturbances: Denies auditory or visual hallucinations and Does not appear to be responding to internal stimuli   Risk Potential: Suicidal ideation - None at present  Homicidal ideation - None at present  Potential for aggression - Not at present   Sensorium:  oriented to person, place and time/date   Memory:  recent and remote memory grossly intact   Consciousness:  alert and awake   Attention/Concentration: attention span and concentration are age appropriate   Insight:  limited   Judgment: limited   Gait/Station: normal gait/station   Motor Activity: no abnormal movements       Current Medications:  Current Facility-Administered Medications   Medication Dose Route Frequency Provider Last Rate   • acetaminophen  650 mg Oral Q6H PRN Jung Vanegas MD     • acetaminophen  650 mg Oral Q4H PRN Jung Vanegas MD     • acetaminophen  975 mg Oral Q6H PRN Jung Vanegas MD     • albuterol  2 puff Inhalation Q6H PRN Guerline Ramirez MD     • aluminum-magnesium hydroxide-simethicone  30 mL Oral Q4H PRN [address] [facility], DO     • haloperidol lactate  2 5 mg Intramuscular Q6H PRN Max 4/day Silverio Backer, DO      And   • LORazepam  1 mg Intramuscular Q6H PRN Max 4/day Silverio Backer, DO      And   • benztropine  0 5 mg Intramuscular Q6H PRN Max 4/day Silverio Backer, DO     • benztropine  1 mg Intramuscular Q4H PRN Max 6/day Aileen Hernandez MD     • haloperidol lactate  5 mg Intramuscular Q4H PRN Max 4/day Silverio Backer, DO      And   • LORazepam  2 mg Intramuscular Q4H PRN Max 4/day Silverio Backer, DO      And   • benztropine  1 mg Intramuscular Q4H PRN Max 4/day Silverio Backer, DO     • benztropine  1 mg Oral Q4H PRN Max 6/day Aileen Hernandez MD     • calcium carbonate  500 mg Oral Daily PRN Jeremy Davenport, DO     • cholecalciferol  1,000 Units Oral Daily Aileen Hernandez MD     • cloZAPine  100 mg Oral After Lunch Myrene Fothergill, MD     • cloZAPine  100 mg Oral Daily Myrene Fothergill, MD     • cloZAPine  300 mg Oral HS Silverio Backer, DO     • hydrOXYzine HCL  50 mg Oral Q6H PRN Max 4/day Aileen Hernandez MD      Or   • diphenhydrAMINE  50 mg Intramuscular Q6H PRN Aileen Hernandez MD     • docusate sodium  100 mg Oral BID Aileen Hernandez MD     • fenofibrate  145 mg Oral Daily Aileen Hernandez MD     • fluticasone  1 spray Each Nare BID Aileen Hernandez MD     • glycerin-hypromellose-  1 drop Both Eyes Q3H PRN Aileen Hernandez MD     • glycopyrrolate  1 mg Oral BID Myrene Fothergill, MD     • haloperidol  0 5 mg Oral Q4H PRN Max 6/day Silverio Backer, DO     • haloperidol  1 mg Oral BID PRN Silverio Backer, DO     • haloperidol  10 mg Oral BID Silverio Backer, DO     • haloperidol  2 mg Oral Daily PRN Silverio Backer, DO     • hydrocortisone   Topical 4x Daily PRN Vern Guidry PA-C     • hydrocortisone   Topical 4x Daily PRN Vern Guidry PA-C     • hydrOXYzine HCL  100 mg Oral Q6H PRN Max 4/day Aileen Hernandez MD      Or   • LORazepam  2 mg Intramuscular Q6H PRN Luca Garcia MD     • hydrOXYzine HCL  25 mg Oral Q6H PRN Max 4/day Luca Garcia MD     • lidocaine  1 patch Topical Daily Luca Garcia MD     • loratadine  10 mg Oral Daily Luca Garcia MD     • magnesium hydroxide  30 mL Oral Daily PRN Charlotte Han PA-C     • methocarbamol  500 mg Oral Q6H PRN Luca Garcia MD     • nicotine  1 patch Transdermal Daily Luca Garcia MD     • nicotine polacrilex  4 mg Oral Q2H PRN Luca Garcia MD     • ondansetron  4 mg Oral Q6H PRN Gilda Block DO     • pantoprazole  40 mg Oral Early Morning Luca Garcia MD     • polyethylene glycol  17 g Oral Daily Chucho Barraza MD     • propranolol  10 mg Oral Q8H PRN Luca Garcia MD     • senna-docusate sodium  1 tablet Oral Daily PRN Luca Garcia MD     • senna-docusate sodium  2 tablet Oral HS Chucho Barraza MD     • sorbitol  30 mL Oral Q1H PRN Luca Garcia MD     • SUMAtriptan  50 mg Oral Daily PRN JARED Masters     • traZODone  50 mg Oral HS PRN Luca Gracia MD     • venlafaxine  225 mg Oral Daily Jacque Strickland DO         Suicide/Homicide Risk Assessment:  Risk of Harm to Self:   Based on today's assessment, Rosy Limon presents the following risk of harm to self: high    Risk of Harm to Others:  Based on today's assessment, Rosy Limon presents the following risk of harm to others: high    The following interventions are recommended: behavioral checks every 7 minutes, continued hospitalization on locked unit    Behavioral Health Medications: All current active meds have been reviewed  Changes as in plan section above  Risks, benefits and possible side effects of Medications:   Risks, benefits, and possible side effects of medications explained to patient and patient verbalizes understanding        Counseling / Coordination of Care:  Patient's progress discussed with staff in treatment team meeting  Medications, treatment progress and treatment plan reviewed with patient  David EdmondDO 01/05/23  Psychiatry Resident, PGY-II    This note was completed in part utilizing Dragon dictation Software  Grammatical, translation, syntax errors, random word insertions, spelling mistakes, and incomplete sentences may be an occasional consequence of this system secondary to software limitations with voice recognition, ambient noise, and hardware issues  If you have any questions or concerns about the content, text, or information contained within the body of this dictation, please contact the provider for clarification

## 2023-01-05 NOTE — PROGRESS NOTES
01/05/23 0844   Team Meeting   Meeting Type Daily Rounds   Team Members Present   Team Members Present Physician;Nurse;   Physician Team Member [facility]   Nursing Team Member Amy SULLIVAN and Dillon Jolley [facility]    Care Management Team Member Leeanna   Patient/Family Present   Patient Present No   Patient's Family Present No     Pt less paranoid and disorganized  Pt denies AVH  Meds and meal compliant  Continue ECT and med management -increase Effexor and Haldol  Continue to monitor  Discharge to be determined

## 2023-01-05 NOTE — PROGRESS NOTES
01/04/23 1400   Activity/Group Checklist   Group Other (Comment)  (Group Art Therapy/Psychodynamic, Exploring Perspectives of 3D space)   Attendance Attended   Attendance Duration (min) 46-60   Interactions Interacted appropriately  (Required prompting; observed only)   Affect/Mood Appropriate   Goals Achieved Able to listen to others  (Patient did not engage materials; observed and communicated when prompted)

## 2023-01-05 NOTE — PLAN OF CARE
Met with patient, discussed pt's engagement and participation in groups  Pt c/o tolerable jaw pain as post ECT symptom, CM encouraged pt to seek help from nursing if pain worsens  Discussed pt's concerns about not being able to pay the rent  Based on writer's communication with [facility] team, pt was advised that she will be able to return to the group home  Pt responded "Thank you so much for clarifying this I was so worries"  Pt denies AVH, SI, and HI  Pt appears less disorganized  Continue to monitor          Problem: DISCHARGE PLANNING  Goal: Discharge to home or other facility with appropriate resources  Description: INTERVENTIONS:  - Identify barriers to discharge w/patient and caregiver  - Arrange for needed discharge resources and transportation as appropriate  - Identify discharge learning needs (meds, wound care, etc )  - Arrange for interpretive services to assist at discharge as needed  - Refer to Case Management Department for coordinating discharge planning if the patient needs post-hospital services based on physician/advanced practitioner order or complex needs related to functional status, cognitive ability, or social support system  Outcome: Progressing

## 2023-01-05 NOTE — PROGRESS NOTES
01/05/23 1100   Activity/Group Checklist   Group   (recovery group- finding inner peace)   Attendance Attended   Attendance Duration (min) 46-60   Interactions Interacted appropriately   Affect/Mood Appropriate   Goals Achieved Discussed coping strategies; Discussed self-esteem issues; Able to listen to others; Able to engage in interactions; Able to self-disclose

## 2023-01-06 ENCOUNTER — ANESTHESIA EVENT (INPATIENT)
Dept: PREOP/PACU | Facility: HOSPITAL | Age: 53
End: 2023-01-06

## 2023-01-06 ENCOUNTER — APPOINTMENT (INPATIENT)
Dept: PREOP/PACU | Facility: HOSPITAL | Age: 53
End: 2023-01-06
Attending: PSYCHIATRY & NEUROLOGY

## 2023-01-06 ENCOUNTER — ANESTHESIA (INPATIENT)
Dept: PREOP/PACU | Facility: HOSPITAL | Age: 53
End: 2023-01-06

## 2023-01-06 PROCEDURE — GZB4ZZZ OTHER ELECTROCONVULSIVE THERAPY: ICD-10-PCS | Performed by: PSYCHIATRY & NEUROLOGY

## 2023-01-06 RX ORDER — SODIUM CHLORIDE, SODIUM LACTATE, POTASSIUM CHLORIDE, CALCIUM CHLORIDE 600; 310; 30; 20 MG/100ML; MG/100ML; MG/100ML; MG/100ML
125 INJECTION, SOLUTION INTRAVENOUS CONTINUOUS
Status: CANCELLED | OUTPATIENT
Start: 2023-01-06

## 2023-01-06 RX ORDER — SODIUM CHLORIDE, SODIUM LACTATE, POTASSIUM CHLORIDE, CALCIUM CHLORIDE 600; 310; 30; 20 MG/100ML; MG/100ML; MG/100ML; MG/100ML
125 INJECTION, SOLUTION INTRAVENOUS CONTINUOUS
Status: DISCONTINUED | OUTPATIENT
Start: 2023-01-06 | End: 2023-01-06

## 2023-01-06 RX ORDER — MIDAZOLAM HYDROCHLORIDE 2 MG/2ML
INJECTION, SOLUTION INTRAMUSCULAR; INTRAVENOUS AS NEEDED
Status: DISCONTINUED | OUTPATIENT
Start: 2023-01-06 | End: 2023-01-06

## 2023-01-06 RX ORDER — SUCCINYLCHOLINE/SOD CL,ISO/PF 100 MG/5ML
SYRINGE (ML) INTRAVENOUS AS NEEDED
Status: DISCONTINUED | OUTPATIENT
Start: 2023-01-06 | End: 2023-01-06

## 2023-01-06 RX ORDER — KETOROLAC TROMETHAMINE 30 MG/ML
INJECTION, SOLUTION INTRAMUSCULAR; INTRAVENOUS AS NEEDED
Status: DISCONTINUED | OUTPATIENT
Start: 2023-01-06 | End: 2023-01-06

## 2023-01-06 RX ORDER — ESMOLOL HYDROCHLORIDE 10 MG/ML
INJECTION INTRAVENOUS AS NEEDED
Status: DISCONTINUED | OUTPATIENT
Start: 2023-01-06 | End: 2023-01-06

## 2023-01-06 RX ORDER — SODIUM CHLORIDE 9 MG/ML
INJECTION, SOLUTION INTRAVENOUS CONTINUOUS PRN
Status: DISCONTINUED | OUTPATIENT
Start: 2023-01-06 | End: 2023-01-06

## 2023-01-06 RX ORDER — LABETALOL HYDROCHLORIDE 5 MG/ML
INJECTION, SOLUTION INTRAVENOUS AS NEEDED
Status: DISCONTINUED | OUTPATIENT
Start: 2023-01-06 | End: 2023-01-06

## 2023-01-06 RX ORDER — SODIUM CHLORIDE 9 MG/ML
50 INJECTION, SOLUTION INTRAVENOUS CONTINUOUS
Status: DISCONTINUED | OUTPATIENT
Start: 2023-01-06 | End: 2023-01-06

## 2023-01-06 RX ORDER — GLYCOPYRROLATE 0.2 MG/ML
INJECTION INTRAMUSCULAR; INTRAVENOUS AS NEEDED
Status: DISCONTINUED | OUTPATIENT
Start: 2023-01-06 | End: 2023-01-06

## 2023-01-06 RX ADMIN — HALOPERIDOL 10 MG: 10 TABLET ORAL at 19:09

## 2023-01-06 RX ADMIN — GLYCOPYRROLATE 1 MG: 1 TABLET ORAL at 19:09

## 2023-01-06 RX ADMIN — ACETAMINOPHEN 975 MG: 325 TABLET ORAL at 08:56

## 2023-01-06 RX ADMIN — CLOZAPINE 100 MG: 100 TABLET ORAL at 16:03

## 2023-01-06 RX ADMIN — METHOCARBAMOL 500 MG: 500 TABLET, FILM COATED ORAL at 16:03

## 2023-01-06 RX ADMIN — CLOZAPINE 100 MG: 100 TABLET ORAL at 08:23

## 2023-01-06 RX ADMIN — FENOFIBRATE 145 MG: 145 TABLET, COATED ORAL at 08:23

## 2023-01-06 RX ADMIN — CHOLECALCIFEROL TAB 25 MCG (1000 UNIT) 1000 UNITS: 25 TAB at 08:23

## 2023-01-06 RX ADMIN — METHOHEXITAL SODIUM 100 MG: 500 INJECTION, POWDER, LYOPHILIZED, FOR SOLUTION INTRAMUSCULAR; INTRAVENOUS; RECTAL at 06:21

## 2023-01-06 RX ADMIN — HALOPERIDOL 10 MG: 10 TABLET ORAL at 08:24

## 2023-01-06 RX ADMIN — VENLAFAXINE HYDROCHLORIDE 225 MG: 150 CAPSULE, EXTENDED RELEASE ORAL at 08:23

## 2023-01-06 RX ADMIN — CLOZAPINE 300 MG: 100 TABLET ORAL at 21:17

## 2023-01-06 RX ADMIN — KETOROLAC TROMETHAMINE 30 MG: 30 INJECTION, SOLUTION INTRAMUSCULAR; INTRAVENOUS at 06:19

## 2023-01-06 RX ADMIN — DOCUSATE SODIUM 100 MG: 100 CAPSULE, LIQUID FILLED ORAL at 08:23

## 2023-01-06 RX ADMIN — Medication 100 MG: at 06:21

## 2023-01-06 RX ADMIN — DOCUSATE SODIUM 100 MG: 100 CAPSULE, LIQUID FILLED ORAL at 19:09

## 2023-01-06 RX ADMIN — GLYCOPYRROLATE 1 MG: 1 TABLET ORAL at 08:23

## 2023-01-06 RX ADMIN — MIDAZOLAM 2 MG: 1 INJECTION INTRAMUSCULAR; INTRAVENOUS at 06:26

## 2023-01-06 RX ADMIN — LABETALOL HYDROCHLORIDE 10 MG: 5 INJECTION, SOLUTION INTRAVENOUS at 06:21

## 2023-01-06 RX ADMIN — GLYCOPYRROLATE 0.2 MG: 0.2 INJECTION, SOLUTION INTRAMUSCULAR; INTRAVENOUS at 06:10

## 2023-01-06 RX ADMIN — ESMOLOL HYDROCHLORIDE 50 MG: 100 INJECTION, SOLUTION INTRAVENOUS at 06:26

## 2023-01-06 RX ADMIN — SENNOSIDES AND DOCUSATE SODIUM 2 TABLET: 50; 8.6 TABLET ORAL at 21:17

## 2023-01-06 RX ADMIN — SODIUM CHLORIDE: 0.9 INJECTION, SOLUTION INTRAVENOUS at 06:06

## 2023-01-06 RX ADMIN — LORATADINE 10 MG: 10 TABLET ORAL at 08:23

## 2023-01-06 RX ADMIN — TRAZODONE HYDROCHLORIDE 50 MG: 50 TABLET ORAL at 00:35

## 2023-01-06 NOTE — PROGRESS NOTES
Progress Note - Behavioral Health   Sharon Garcia 47 y o  female MRN: [id number]  Unit/Bed#: [facility] 377-01 Encounter: [id number]    Assessment/Plan   Principal Problem:    Schizoaffective disorder, bipolar type ([address] )  Active Problems:    LYNN (generalized anxiety disorder)    Hyperlipidemia    Post-traumatic stress disorder, chronic    Mild intermittent asthma without complication    Gastroesophageal reflux disease    Vitamin D deficiency    Dependence on nicotine from cigarettes    Mild neurocognitive disorder    Medical clearance for psychiatric admission    Non-seasonal allergic rhinitis    Chronic midline low back pain without sciatica      Recommended Treatment:   No psychopharmacologic changes necessary at this moment; will continue to assess daily for further optimization  Patient had ECT 7 out of 12 today    Continue with pharmacotherapy, group therapy, milieu therapy and occupational therapy  Continue to assess for adverse medication side effects  Encourage Av Lamb to participate in nonverbal forms of therapy including journaling and art/music therapy  Continue frequent safety checks and vitals per unit protocol  Continue to engage CM/SW to assist with collateral, disposition planning, and the implementation of an individualized, patient-centered plan of care  Continue medical management by medical team   Case discussed with treatment team     Legal Status: 201  ------------------------------------------------------------    Subjective: All documentation including nursing notes, medication history to ensure medication adherence on the unit, labs, and vitals were reviewed  Av Zacarias was evaluated this morning for continuity of care and no acute distress noted throughout the evaluation  Over the past 24 hours per nursing report, Av Zacarias has been cooperative on the unit and compliant with medications    Patient was observed yelling "get the fuck out of my room!"  When staff approached she stated "I just want them to leave me alone"  Today, Maryanne Gagnon is consenting for safety on the unit  Maryanne Gagnon reports feeling " better " Maryanne Gagnon notes having good sleep  Maryanne Gagnon states having a good appetite  Maryanne Gagnon has been taking the medications as prescribed and reporting no side effects  Maryanne Gagnon denies suicidal ideations  Maryanne Gagnon denies homicidal ideations  Regarding hallucinations, Maryanne Gagnon denies and does not appear to be internally stimulated  Patient appears brighter today and still needs reassurance surrounding ECT necessity as well as memory difficulties     Patient is receptive  all other systems are negative    PRNs overnight: Robaxin and trazodone  VS: Reviewed, within normal limits    Progress Toward Goals: slow improvement    Psychiatric Review of Systems:  Behavior over the last 24 hours:  improved  Sleep: normal  Appetite: normal  Medication side effects: No   ROS: all other systems are negative    Vital signs in last 24 hours:  Temp:  [97 °F (36 1 °C)-98 7 °F (37 1 °C)] 98 6 °F (37 °C)  HR:  [] 100  Resp:  [16-23] 16  BP: (106-121)/(68-80) 121/68    Laboratory results:  I have personally reviewed all pertinent laboratory/tests results  No results found for this or any previous visit (from the past 48 hour(s))        Mental Status Evaluation:    Appearance:  casually dressed, marginal hygiene, looks older than stated age, overweight   Behavior:  pleasant, cooperative   Speech:  normal rate and volume   Mood:  "Better"   Affect:  Brighter, less anxious   Thought Process:  Less disorganized than previous   Associations: intact associations   Thought Content:  paranoid and bizarre delusions   Perceptual Disturbances: Denies auditory or visual hallucinations and Does not appear to be responding to internal stimuli   Risk Potential: Suicidal ideation - None at present  Homicidal ideation - None at present  Potential for aggression - Not at present   Sensorium:  oriented to person, place and time/date   Memory:  recent and remote memory grossly intact   Consciousness:  alert and awake   Attention/Concentration: attention span and concentration are age appropriate   Insight:  limited   Judgment: limited   Gait/Station: normal gait/station   Motor Activity: no abnormal movements       Current Medications:  Current Facility-Administered Medications   Medication Dose Route Frequency Provider Last Rate   • acetaminophen  650 mg Oral Q6H PRN Fransisco Marshall MD     • acetaminophen  650 mg Oral Q4H PRN Fransisco Marshall MD     • acetaminophen  975 mg Oral Q6H PRN Fransisco Marshall MD     • albuterol  2 puff Inhalation Q6H PRN Radha Wilson MD     • aluminum-magnesium hydroxide-simethicone  30 mL Oral Q4H PRN [facility], DO     • haloperidol lactate  2 5 mg Intramuscular Q6H PRN Max 4/day Israel Pluck, DO      And   • LORazepam  1 mg Intramuscular Q6H PRN Max 4/day Israel Pluck, DO      And   • benztropine  0 5 mg Intramuscular Q6H PRN Max 4/day Israel Pluck, DO     • benztropine  1 mg Intramuscular Q4H PRN Max 6/day Fransisco Marshall MD     • haloperidol lactate  5 mg Intramuscular Q4H PRN Max 4/day Israel Pluck, DO      And   • LORazepam  2 mg Intramuscular Q4H PRN Max 4/day Israel Pluck, DO      And   • benztropine  1 mg Intramuscular Q4H PRN Max 4/day Israel Pluck, DO     • benztropine  1 mg Oral Q4H PRN Max 6/day Fransisco Marshall MD     • calcium carbonate  500 mg Oral Daily PRN Raymond Davenport, DO     • cholecalciferol  1,000 Units Oral Daily Fransisco Marshall MD     • cloZAPine  100 mg Oral After Lunch Radha Wilson MD     • cloZAPine  100 mg Oral Daily Radha Wilson MD     • cloZAPine  300 mg Oral HS Israel Pluck, DO     • hydrOXYzine HCL  50 mg Oral Q6H PRN Max 4/day Fransisco Marshall MD      Or   • diphenhydrAMINE  50 mg Intramuscular Q6H PRN Fransisco Marshall MD     • docusate sodium  100 mg Oral BID Fransisco Marshall MD     • fenofibrate  145 mg Oral Daily May MD Randi     • fluticasone  1 spray Each Nare BID May MD Randi     • glycerin-hypromellose-  1 drop Both Eyes Q3H PRN May MD Randi     • glycopyrrolate  1 mg Oral BID Trevor Fletcher MD     • haloperidol  0 5 mg Oral Q4H PRN Max 6/day Marlan Justine, DO     • haloperidol  1 mg Oral BID PRN Marlan Justine, DO     • haloperidol  10 mg Oral BID Marlan Justine, DO     • haloperidol  2 mg Oral Daily PRN Marlan Justine, DO     • hydrocortisone   Topical 4x Daily PRN Marina Guerra PA-C     • hydrocortisone   Topical 4x Daily PRN Marina Guerra PA-C     • hydrOXYzine HCL  100 mg Oral Q6H PRN Max 4/day May MD Randi      Or   • LORazepam  2 mg Intramuscular Q6H PRN May MD Randi     • hydrOXYzine HCL  25 mg Oral Q6H PRN Max 4/day May MD Randi     • lidocaine  1 patch Topical Daily May MD Randi     • loratadine  10 mg Oral Daily May MD Randi     • magnesium hydroxide  30 mL Oral Daily PRN Marina Guerra PA-C     • methocarbamol  500 mg Oral Q6H PRN May MD Randi     • nicotine  1 patch Transdermal Daily May MD Randi     • nicotine polacrilex  4 mg Oral Q2H PRN May MD Randi     • ondansetron  4 mg Oral Q6H PRN [address] [facility], DO     • pantoprazole  40 mg Oral Early Morning May MD Randi     • polyethylene glycol  17 g Oral Daily Trevor Fletcher MD     • propranolol  10 mg Oral Q8H PRN May MD Randi     • senna-docusate sodium  1 tablet Oral Daily PRN May MD Randi     • senna-docusate sodium  2 tablet Oral HS Trevor Fletcher MD     • sorbitol  30 mL Oral Q1H PRN May MD Randi     • SUMAtriptan  50 mg Oral Daily PRN JARED Jacobo     • traZODone  50 mg Oral HS PRN May MD Randi     • venlafaxine  225 mg Oral Daily Giovanna Fletcher, DO         Suicide/Homicide Risk Assessment:  Risk of Harm to Self:   Based on today's assessment, Eh Doyle presents the following risk of harm to self: high    Risk of Harm to Others:  Based on today's assessment, Eh Doyle presents the following risk of harm to others: moderate    The following interventions are recommended: behavioral checks every 7 minutes, continued hospitalization on locked unit    Behavioral Health Medications: All current active meds have been reviewed  Changes as in plan section above  Risks, benefits and possible side effects of Medications:   Risks, benefits, and possible side effects of medications explained to patient and patient verbalizes understanding  Counseling / Coordination of Care:  Patient's progress discussed with staff in treatment team meeting  Medications, treatment progress and treatment plan reviewed with patient  Mino Hunter DO 01/06/23  Psychiatry Resident, PGY-II    This note was completed in part utilizing Dragon dictation Software  Grammatical, translation, syntax errors, random word insertions, spelling mistakes, and incomplete sentences may be an occasional consequence of this system secondary to software limitations with voice recognition, ambient noise, and hardware issues  If you have any questions or concerns about the content, text, or information contained within the body of this dictation, please contact the provider for clarification

## 2023-01-06 NOTE — PLAN OF CARE
Problem: Alteration in Thoughts and Perception  Goal: Treatment Goal: Gain control of psychotic behaviors/thinking, reduce/eliminate presenting symptoms and demonstrate improved reality functioning upon discharge  Outcome: Progressing     Problem: Ineffective Coping  Goal: Identifies ineffective coping skills  Outcome: Progressing  Goal: Identifies healthy coping skills  Outcome: Progressing  Goal: Demonstrates healthy coping skills  Outcome: Progressing     Problem: Depression  Goal: Treatment Goal: Demonstrate behavioral control of depressive symptoms, verbalize feelings of improved mood/affect, and adopt new coping skills prior to discharge  Outcome: Progressing     Problem: Electroconvulsive therapy (ECT)  Goal: Treatment Goal: Demonstrate a reduction of confusion and improved orientation to person, place, time and/or situation upon discharge, according to optimum baseline/ability  Outcome: Progressing  Goal: Verbalizes/displays adequate comfort level or baseline comfort level  Description: Interventions:  - Encourage patient to monitor pain and request assistance  - Assess pain using appropriate pain scale  - Administer analgesics based on type and severity of pain and evaluate response  - Implement non-pharmacological measures as appropriate and evaluate response  - Consider cultural and social influences on pain and pain management  - Notify physician/advanced practitioner if interventions unsuccessful or patient reports new pain  Outcome: Progressing  Goal: Achieves stable or improved neurological status  Description: INTERVENTIONS  - Monitor and report changes in neurological status  - Monitor vital signs such as temperature, blood pressure, glucose, and any other labs ordered   - Initiate measures to prevent increased intracranial pressure  - Monitor for seizure activity and implement precautions if appropriate      Outcome: Progressing  Goal: Achieves maximal functionality and self care  Description: Signout taken with dispo pending CT results - same as noted below in setting of nonacute UA. Splenic infarct noted for which PCP coverage Dr. Palmer Car updated and surgery/GI Carmen Redd/Lynn consulted with recs appreciated.     Ct Abdomen+pelvis(cpt=74176)    Re INTERVENTIONS  - Monitor swallowing and airway patency with patient fatigue and changes in neurological status  - Encourage and assist patient to increase activity and self care     - Encourage visually impaired, hearing impaired and aphasic patients to use assistive/communication devices  Outcome: Progressing  Goal: Maintain or return mobility to safest level of function  Description: INTERVENTIONS:  - Assess patient's ability to carry out ADLs; assess patient's baseline for ADL function and identify physical deficits which impact ability to perform ADLs (bathing, care of mouth/teeth, toileting, grooming, dressing, etc )  - Assess/evaluate cause of self-care deficits   - Assess range of motion  - Assess patient's mobility  - Assess patient's need for assistive devices and provide as appropriate  - Encourage maximum independence but intervene and supervise when necessary  - Involve family in performance of ADLs  - Assess for home care needs following discharge   - Consider OT consult to assist with ADL evaluation and planning for discharge  - Provide patient education as appropriate  Outcome: Progressing  Goal: Absence of urinary retention  Description: INTERVENTIONS:  - Assess patient’s ability to void and empty bladder  - Monitor I/O  - Bladder scan as needed  - Discuss with physician/AP medications to alleviate retention as needed  - Discuss catheterization for long term situations as appropriate  Outcome: Progressing  Goal: Minimal or absence of nausea and/or vomiting  Description: INTERVENTIONS:  - Administer IV fluids if ordered to ensure adequate hydration  - Maintain NPO status until nausea and vomiting are resolved  - Nasogastric tube if ordered  - Administer ordered antiemetic medications as needed  - Provide nonpharmacologic comfort measures as appropriate  - Advance diet as tolerated, if ordered  - Consider nutrition services referral to assist patient with adequate nutrition and appropriate food right lower pole image 35 series 2. Mild left parapelvic cysts. Small exophytic cyst at the right lower pole image 38 series 5. No solid  or suspicious masses, additional calcifications or hydronephrosis.  AORTA/VASCULAR:   Mild /moderate calcific atheroscl choices  Outcome: Progressing  Goal: Maintains adequate nutritional intake  Description: INTERVENTIONS:  - Monitor percentage of each meal consumed  - Identify factors contributing to decreased intake, treat as appropriate  - Assist with meals as needed  - Monitor I&O, weight, and lab values if indicated  - Obtain nutrition services referral as needed  Outcome: Progressing     Problem: DISCHARGE PLANNING  Goal: Discharge to home or other facility with appropriate resources  Description: INTERVENTIONS:  - Identify barriers to discharge w/patient and caregiver  - Arrange for needed discharge resources and transportation as appropriate  - Identify discharge learning needs (meds, wound care, etc )  - Arrange for interpretive services to assist at discharge as needed  - Refer to Case Management Department for coordinating discharge planning if the patient needs post-hospital services based on physician/advanced practitioner order or complex needs related to functional status, cognitive ability, or social support system  Outcome: Progressing lower pole. No solid or suspicious masses or hydronephrosis. 4. Uncomplicated diverticulosis.  Fat halo sign involving several loops of colon which may reflect obesity although findings also have been reported in cases of inflammatory bowel disease/ulcerati and pelvis were obtained with non-ionic intravenous contrast material. Automated exposure control for dose reduction was used. Adjustment of the mA and/or kV was done based on the patient's size.  Iterative reconstruction technique for dose reduction was em observed. RETROPERITONEUM: No mass or lymphadenopathy is apparent. BONES:   Demineralization with scattered mild focal lumbar spondylosis. ABDOMINAL WALL: Postsurgical changes of right inguinal herniorrhaphy.  Postsurgical scarring along the midline ventra INDICATIONS: Left scrotal swelling and pain. TECHNIQUE: The scrotum was evaluated with gray scale and color duplex Doppler sonography. FINDINGS:   RIGHT TESTICLE: Measures 4.2 x 2.6 x 2.7 cm. Normal echogenicity. No masses.   EPIDIDYMIS: Normal size and

## 2023-01-06 NOTE — PROGRESS NOTES
01/06/23 6442   Team Meeting   Meeting Type Daily Rounds   Team Members Present   Team Members Present Physician;Nurse;   Physician Team Member Sina Yusuf Team Member Neelam Jolley [facility]    Care Management Team Member Leeanna   Patient/Family Present   Patient Present No   Patient's Family Present No     Pt less Pt had ECT 7 of 12, continue ECT  Meds and meal compliant  Prn Trazonde  Continue Med management  Discharge to bed determined  Continue to monitor

## 2023-01-06 NOTE — CASE MANAGEMENT
Pt was lying in bed when approached by the writer  Reports feeling tired and report some confusion post ECT  However, denies SI ,Hi and AVH at this time  Continue to monitor

## 2023-01-06 NOTE — ANESTHESIA PREPROCEDURE EVALUATION
Procedure:  ECT INPATIENT    Relevant Problems   CARDIO   (+) Hyperlipidemia      GI/HEPATIC   (+) Gastroesophageal reflux disease      MUSCULOSKELETAL   (+) Chronic midline low back pain without sciatica   (+) Degenerative disc disease, lumbar      NEURO/PSYCH   (+) Chronic midline low back pain without sciatica   (+) LYNN (generalized anxiety disorder)   (+) History of alcohol dependence (HCC)   (+) Other headache syndrome   (+) Post-traumatic stress disorder, chronic      PULMONARY   (+) Emphysema lung (HCC)   (+) Mild intermittent asthma without complication        Physical Exam    Airway    Mallampati score: II  TM Distance: >3 FB  Neck ROM: full     Dental       Cardiovascular      Pulmonary      Other Findings        Anesthesia Plan  ASA Score- 3     Anesthesia Type- general with ASA Monitors  Additional Monitors:   Airway Plan:           Plan Factors-Exercise tolerance (METS): >4 METS  Chart reviewed  Patient summary reviewed  Patient did not smoke on day of surgery  Induction- intravenous  Postoperative Plan-     Informed Consent- Anesthetic plan and risks discussed with patient  I personally reviewed this patient with the CRNA  Discussed and agreed on the Anesthesia Plan with the CRNA  Ganesh Bowers

## 2023-01-06 NOTE — ANESTHESIA POSTPROCEDURE EVALUATION
Post-Op Assessment Note    CV Status:  Stable    Pain management: satisfactory to patient     Mental Status:  Sleepy   Hydration Status:  Stable   PONV Controlled:  None   Airway Patency:  Patent      Post Op Vitals Reviewed: Yes      Staff: CRNA         No notable events documented      /73 (01/06/23 3682)    Temp (!) 97 °F (36 1 °C) (01/06/23 4140)    Pulse 94 (01/06/23 0632)   Resp 20 (01/06/23 0632)    SpO2 92 % (01/06/23 5070)

## 2023-01-06 NOTE — PROGRESS NOTES
01/05/23 1430   Activity/Group Checklist   Group Other (Comment)  (Group Art Therapy/Studio-Based, Continue with Unfinished Artwork from [employer])   Attendance Attended   Attendance Duration (min) 46-60   Interactions Interacted appropriately   Affect/Mood Appropriate   Goals Achieved Able to listen to others; Able to engage in interactions  (Patient observed only and engaged in dialogue when prompted;  Patient continues to struggle with side-effects of ECT)

## 2023-01-06 NOTE — PROGRESS NOTES
This therapist met with patient individually due to "high risk" status  Patient presented as cooperative, calm and mildly disoriented  Patient reported she continues to struggle with memory lapses, forgetting that she is reminded every day by staff that short-term memory loss is common during ECT treatment  Patient was easily assured  This therapist provided support

## 2023-01-06 NOTE — CASE MANAGEMENT
[facility] Nurse Gretchen Guajardo was called at cell [phone]; provided status update and current meds  Advised on pt's completion of 7 of 12 ECT sessions  Discussed pt's concerns about paying rent to the landlord  Gretchen Guajardo noted that pt does not have issue with paying rent and will be able to return to the group home upon discharge  Also, advise of expected d/c date 1/19  Continue to coordinate

## 2023-01-06 NOTE — PROCEDURES
Procedure Note - ECT  Catalina Davies 47 y o  female MRN: [id number]    Patient reports that she is doing overall well today  Treatment was done with Dr Kamryn Collazo today, who was supervised for the duration of the procedure  She notes some improvement in depression with ECT treatments  She has noticed memory impairment but finds this to be tolerable and is reminded that this is expected to improve once ECT treatments are complete  She denies SI and HI, endorses that she has been having AH still  She is agreeable to proceed with ECT treatment today  Time out was taken with staff to confirm correct patient and correct procedure to be performed      Session Number: 007    Diagnosis: Principal Problem:    Schizoaffective disorder, bipolar type (Formerly [facility])  Active Problems:    LYNN (generalized anxiety disorder)    Hyperlipidemia    Post-traumatic stress disorder, chronic    Mild intermittent asthma without complication    Gastroesophageal reflux disease    Vitamin D deficiency    Dependence on nicotine from cigarettes    Mild neurocognitive disorder    Medical clearance for psychiatric admission    Non-seasonal allergic rhinitis    Chronic midline low back pain without sciatica      ECT Type: Inpatient, Acute    Anesthesia: Brevital    Electrode Placement: bilateral    Energy level: 65%      Seizure Duration     EE sec  EM sec observed, not detected by machine    PSI 82 0%     Results:Clinical seizure was satisfactory, Patient tolerated ECT well, esmolol and labetalol given for elevated BP    Vitals:    23 0645   BP: 107/69   Pulse: (!) 106   Resp: 18   Temp:    SpO2: 93%        Medication Administration - last 24 hours from 2023 0656 to 2023 8194       Date/Time Order Dose Route Action Action by     2023 0812 EST cholecalciferol (VITAMIN D3) tablet 1,000 Units 1,000 Units Oral Given Nat Terrazas RN     2023 1713 EST docusate sodium (COLACE) capsule 100 mg 100 mg Oral Given Paralee Lipschutz, RN     01/05/2023 2876 EST docusate sodium (COLACE) capsule 100 mg 100 mg Oral Given Paralee Lipschutz, RN     01/05/2023 7480 EST fenofibrate (TRICOR) tablet 145 mg 145 mg Oral Given Paralee Lipschutz, RN     01/05/2023 2101 EST lidocaine (LIDODERM) 5 % patch 1 patch 1 patch Topical Patch Removed Jordan Disbrow, RN     01/05/2023 0812 EST lidocaine (LIDODERM) 5 % patch 1 patch 1 patch Topical Medication Applied Paralee Lipschutz, RN     01/05/2023 1717 EST fluticasone (FLONASE) 50 mcg/act nasal spray 1 spray 1 spray Each Nare Not Given Paralee Lipschutz, RN     01/05/2023 1462 EST fluticasone (FLONASE) 50 mcg/act nasal spray 1 spray 1 spray Each Nare Given Paralee Lipschutz, RN     01/05/2023 1536 EST loratadine (CLARITIN) tablet 10 mg 10 mg Oral Given Paralee Lipschutz, RN     01/05/2023 0813 EST nicotine (NICODERM CQ) 21 mg/24 hr TD 24 hr patch 1 patch 1 patch Transdermal Medication Applied Paralee Lipschutz, RN     01/05/2023 6725 EST nicotine (NICODERM CQ) 21 mg/24 hr TD 24 hr patch 1 patch 1 patch Transdermal Patch Removed Paralee Lipschutz, RN     01/05/2023 1223 EST methocarbamol (ROBAXIN) tablet 500 mg 500 mg Oral Given Paralee Lipschutz, RN     01/06/2023 0035 EST traZODone (DESYREL) tablet 50 mg 50 mg Oral Given Nermin Motawai, RN     01/05/2023 1713 EST glycopyrrolate (ROBINUL) tablet 1 mg 1 mg Oral Given Paralee Lipschutz, RN     01/05/2023 6852 EST glycopyrrolate (ROBINUL) tablet 1 mg 1 mg Oral Given Paralee Lipschutz, RN     01/05/2023 1346 EST cloZAPine (CLOZARIL) tablet 100 mg 100 mg Oral Given Paralee Lipschutz, RN     01/05/2023 5176 EST cloZAPine (CLOZARIL) tablet 100 mg 100 mg Oral Given Paralee Lipschutz, RN     01/05/2023 0358 EST polyethylene glycol (MIRALAX) packet 17 g 17 g Oral Given Sim Sharp RN     01/05/2023 2100 EST senna-docusate sodium (SENOKOT S) 8 6-50 mg per tablet 2 tablet 2 tablet Oral Given Ryan Mejia RN 01/05/2023 2101 EST cloZAPine (CLOZARIL) tablet 300 mg 300 mg Oral Given Jordan Thurston, RN     01/05/2023 1713 EST haloperidol (HALDOL) tablet 10 mg 10 mg Oral Given Avtar Valencia, RN     01/05/2023 0192 EST haloperidol (HALDOL) tablet 10 mg 10 mg Oral Given Avtar Valencia, RN     01/05/2023 1104 EST venlafaxine (EFFEXOR-XR) 24 hr capsule 225 mg 225 mg Oral Given Avtar Annie, RN     01/06/2023 4341 EST sodium chloride 0 9 % infusion 0 mL/hr Intravenous Stopped Isrrael Valles RN             Media Information  Document Information    Clinical Image - Mobile Device   EEG   01/06/2023 06:53   Attached To:    Hospital Encounter on 12/14/22     Source Information    Mino Hunter DO   [facility]     Anyalle Mess 01/06/23

## 2023-01-07 RX ADMIN — TRAZODONE HYDROCHLORIDE 50 MG: 50 TABLET ORAL at 21:48

## 2023-01-07 RX ADMIN — VENLAFAXINE HYDROCHLORIDE 225 MG: 150 CAPSULE, EXTENDED RELEASE ORAL at 08:11

## 2023-01-07 RX ADMIN — CLOZAPINE 100 MG: 100 TABLET ORAL at 15:20

## 2023-01-07 RX ADMIN — DOCUSATE SODIUM 100 MG: 100 CAPSULE, LIQUID FILLED ORAL at 08:11

## 2023-01-07 RX ADMIN — HYDROXYZINE HYDROCHLORIDE 100 MG: 50 TABLET ORAL at 21:48

## 2023-01-07 RX ADMIN — HALOPERIDOL 10 MG: 10 TABLET ORAL at 17:34

## 2023-01-07 RX ADMIN — CLOZAPINE 300 MG: 100 TABLET ORAL at 21:03

## 2023-01-07 RX ADMIN — ACETAMINOPHEN 975 MG: 325 TABLET ORAL at 08:24

## 2023-01-07 RX ADMIN — DOCUSATE SODIUM 100 MG: 100 CAPSULE, LIQUID FILLED ORAL at 17:34

## 2023-01-07 RX ADMIN — LORATADINE 10 MG: 10 TABLET ORAL at 08:11

## 2023-01-07 RX ADMIN — PANTOPRAZOLE SODIUM 40 MG: 40 TABLET, DELAYED RELEASE ORAL at 06:00

## 2023-01-07 RX ADMIN — FENOFIBRATE 145 MG: 145 TABLET, COATED ORAL at 08:11

## 2023-01-07 RX ADMIN — CLOZAPINE 100 MG: 100 TABLET ORAL at 08:11

## 2023-01-07 RX ADMIN — LORAZEPAM 1 MG: 2 INJECTION INTRAMUSCULAR; INTRAVENOUS at 23:06

## 2023-01-07 RX ADMIN — CHOLECALCIFEROL TAB 25 MCG (1000 UNIT) 1000 UNITS: 25 TAB at 08:11

## 2023-01-07 RX ADMIN — HALOPERIDOL 10 MG: 10 TABLET ORAL at 08:11

## 2023-01-07 RX ADMIN — GLYCOPYRROLATE 1 MG: 1 TABLET ORAL at 17:34

## 2023-01-07 RX ADMIN — SENNOSIDES AND DOCUSATE SODIUM 2 TABLET: 50; 8.6 TABLET ORAL at 20:45

## 2023-01-07 RX ADMIN — GLYCOPYRROLATE 1 MG: 1 TABLET ORAL at 08:11

## 2023-01-07 NOTE — PROGRESS NOTES
Progress Note - Behavioral Health   Jackelyn Melanie 47 y o  female MRN: [id number]  Unit/Bed#: [facility] 377-01 Encounter: [id number]       Patient was visited on unit for continuing care; chart reviewed and discussed with the nursing staff  On approach, the patient was calm and cooperative  She reported feeling better since being on the unit and receiving ECT  She feels slightly concerned about her discharge plan, and next steps  Denied any changes in her appetite, and energy level  No problem initiating and maintaining sleep  She reported AH which has been less intense and descried it as "chatting" which last heard this morning, but reportedly does "not understand what they are trying to tell" her  Denied VH currently  Denied SI/HI, intent or plan upon direct inquiry at this time  Patient continues to be isolative to her room mostly with limited interactions with staff and peers  No reports of aggression or self-injurious behavior on unit  No PRN medications used in the past 24 hours except trazodone at 0035 for insomnia  Patient accepted all offered medications and reported feeling better  No adverse effects of medications noted or reported  Will continue ECT as per treatment plan        Current Mental Status Evaluation:  Appearance and attitude: appeared as stated age, casually dressed, with fair hygiene, sitting in her room, eating Pretzels  Eye contact: good  Motor Function: within normal limits, No PMA/PMR  Gait/station: Not observed  Speech: normal for rate, rhythm, volume, with increased latency and decreased amount  Language: No overt abnormality  Mood/affect: less depressed / Affect was constricted, mood-congruent  Thought Processes: linear  Thought content: denied suicidal ideations or homicidal ideations, no overt delusions elicited  Associations: concrete associations  Perceptual disturbances: auditory hallucinations, no visual hallucinations  Orientation: oriented to time, person, place and to the situational context  Cognitive Function: intact  Memory: not cooperative with formal MMSE  Intellect: average  Fund of knowledge: aware of current events, aware of past history and vocabulary average  Impulse control: good  Insight/judgment: fair/good    Vital signs in last 24 hours:    Temp:  [97 2 °F (36 2 °C)-98 °F (36 7 °C)] 98 °F (36 7 °C)  HR:  [] 121  Resp:  [16] 16  BP: (106-109)/(66-69) 106/66    Laboratory results: I have personally reviewed all pertinent laboratory/tests results    Results from the past 24 hours: No results found for this or any previous visit (from the past 24 hour(s))  Progress Toward Goals: improving    Assessment:  Principal Problem:    Schizoaffective disorder, bipolar type (HCC)  Active Problems:    LYNN (generalized anxiety disorder)    Hyperlipidemia    Post-traumatic stress disorder, chronic    Mild intermittent asthma without complication    Gastroesophageal reflux disease    Vitamin D deficiency    Dependence on nicotine from cigarettes    Mild neurocognitive disorder    Medical clearance for psychiatric admission    Non-seasonal allergic rhinitis    Chronic midline low back pain without sciatica        Plan:  - f/u SLIM recs regarding the medical problems  - Continue medication titration and treatment plan; adjust medication to optimize treatment response and as clinically indicated       Scheduled medications:  Current Facility-Administered Medications   Medication Dose Route Frequency Provider Last Rate   • acetaminophen  650 mg Oral Q6H PRN Leo Gurrola MD     • acetaminophen  650 mg Oral Q4H PRN Leo Gurrola MD     • acetaminophen  975 mg Oral Q6H PRN Leo Gurrola MD     • albuterol  2 puff Inhalation Q6H PRN Malu Kaplan MD     • aluminum-magnesium hydroxide-simethicone  30 mL Oral Q4H PRN Bryanna Block DO     • haloperidol lactate  2 5 mg Intramuscular Q6H PRN Max 4/day Tanvi Olivera DO      And   • LORazepam  1 mg Intramuscular Q6H PRN Max 4/day Ernsten Kapolei, DO      And   • benztropine  0 5 mg Intramuscular Q6H PRN Max 4/day Ernsten Kapolei, DO     • benztropine  1 mg Intramuscular Q4H PRN Max 6/day Jennifer Alfaro MD     • haloperidol lactate  5 mg Intramuscular Q4H PRN Max 4/day Ernsten Kapolei, DO      And   • LORazepam  2 mg Intramuscular Q4H PRN Max 4/day Ernsten Matthew, DO      And   • benztropine  1 mg Intramuscular Q4H PRN Max 4/day Ernsten Kapolei, DO     • benztropine  1 mg Oral Q4H PRN Max 6/day Jennifer Alfaro MD     • calcium carbonate  500 mg Oral Daily PRN Ramesh Davenport, DO     • cholecalciferol  1,000 Units Oral Daily Jennifer Alfaro MD     • cloZAPine  100 mg Oral After Lunch Waylon Nieves MD     • cloZAPine  100 mg Oral Daily Waylon Nieves MD     • cloZAPine  300 mg Oral HS David Castro, DO     • hydrOXYzine HCL  50 mg Oral Q6H PRN Max 4/day Jennifer Alfaro MD      Or   • diphenhydrAMINE  50 mg Intramuscular Q6H PRN Jennifer Alfaro MD     • docusate sodium  100 mg Oral BID Jennifer Alfaro MD     • fenofibrate  145 mg Oral Daily Jennifer Alfaro MD     • fluticasone  1 spray Each Nare BID Jennifer Alfaro MD     • glycerin-hypromellose-  1 drop Both Eyes Q3H PRN Jennifer Alfaro MD     • glycopyrrolate  1 mg Oral BID Waylon Nieves MD     • haloperidol  0 5 mg Oral Q4H PRN Max 6/day Ernsten Matthew, DO     • haloperidol  1 mg Oral BID PRN David Matthew, DO     • haloperidol  10 mg Oral BID Ernsten Kapolei, DO     • haloperidol  2 mg Oral Daily PRN David Kapolei, DO     • hydrocortisone   Topical 4x Daily PRN Corazon Damian PA-C     • hydrocortisone   Topical 4x Daily PRN Corazon Damian PA-C     • hydrOXYzine HCL  100 mg Oral Q6H PRN Max 4/day Jennifer Alfaro MD      Or   • LORazepam  2 mg Intramuscular Q6H PRN Jennifer Alfaro MD     • hydrOXYzine HCL  25 mg Oral Q6H PRN Max 4/day Jennifer Alfaro MD     • lidocaine  1 patch Topical Daily Emma Bell MD     • loratadine  10 mg Oral Daily Emma Bell MD     • magnesium hydroxide  30 mL Oral Daily PRN Rahel Phelps PA-C     • methocarbamol  500 mg Oral Q6H PRN Emma Bell MD     • nicotine  1 patch Transdermal Daily Emma Bell MD     • nicotine polacrilex  4 mg Oral Q2H PRN Emma Bell MD     • ondansetron  4 mg Oral Q6H PRN Marcelina Block DO     • pantoprazole  40 mg Oral Early Morning Emma Bell MD     • polyethylene glycol  17 g Oral Daily Seun Taylor MD     • propranolol  10 mg Oral Q8H PRN Emma Bell MD     • senna-docusate sodium  1 tablet Oral Daily PRN Emma Bell MD     • senna-docusate sodium  2 tablet Oral HS Seun Taylor MD     • sorbitol  30 mL Oral Q1H PRN Emma Bell MD     • SUMAtriptan  50 mg Oral Daily PRN JARED Gan     • traZODone  50 mg Oral HS PRN Emma Bell MD     • venlafaxine  225 mg Oral Daily Jose L Borden DO          PRN:  •  acetaminophen  •  acetaminophen  •  acetaminophen  •  albuterol  •  aluminum-magnesium hydroxide-simethicone  •  haloperidol lactate **AND** LORazepam **AND** benztropine  •  benztropine  •  haloperidol lactate **AND** LORazepam **AND** benztropine  •  benztropine  •  calcium carbonate  •  hydrOXYzine HCL **OR** diphenhydrAMINE  •  glycerin-hypromellose-  •  haloperidol  •  haloperidol  •  haloperidol  •  hydrocortisone  •  hydrocortisone  •  hydrOXYzine HCL **OR** LORazepam  •  hydrOXYzine HCL  •  magnesium hydroxide  •  methocarbamol  •  nicotine polacrilex  •  ondansetron  •  propranolol  •  senna-docusate sodium  •  sorbitol  •  SUMAtriptan  •  traZODone    - Observation: routine    - VS: as per unit protocol  - Diet: Regular diet  - Psychoeducation (benefits and potential risks) discussed, importance of compliance with the psychiatric treatment reiterated, and the patient verbalized understanding of the matter  - Encourage group attendance and milieu therapy    - The pt was educated and agreed to verbalize any suicidal thoughts, frustrations or concerns to the nursing staff, immediately  - Dispo: TBD       Next of Kin  · Extended Emergency Contact Information  · Primary Emergency Contact: Tisha Seugndo  · Mobile Phone: [phone]  · Relation:   · Secondary Emergency Contact: Sharita Stringer  · Work Phone: [phone]  · Relation:       Counseling / Coordination of Care     Total floor / unit time spent today 20 minutes  Greater than 50% of total time was spent with the patient and / or family counseling and / or coordination of care  A description of the counseling / coordination of care  Patient's Rights, confidentiality and exceptions to confidentiality, use of automated medical record, [address] [place] [address] staff access to medical record, and consent to treatment reviewed      Sebastien Yusuf MD  Attending [address]

## 2023-01-08 RX ADMIN — GLYCOPYRROLATE 1 MG: 1 TABLET ORAL at 17:29

## 2023-01-08 RX ADMIN — CHOLECALCIFEROL TAB 25 MCG (1000 UNIT) 1000 UNITS: 25 TAB at 08:19

## 2023-01-08 RX ADMIN — METHOCARBAMOL 500 MG: 500 TABLET, FILM COATED ORAL at 16:51

## 2023-01-08 RX ADMIN — FENOFIBRATE 145 MG: 145 TABLET, COATED ORAL at 08:19

## 2023-01-08 RX ADMIN — DOCUSATE SODIUM 100 MG: 100 CAPSULE, LIQUID FILLED ORAL at 08:19

## 2023-01-08 RX ADMIN — HALOPERIDOL 10 MG: 10 TABLET ORAL at 08:19

## 2023-01-08 RX ADMIN — METHOCARBAMOL 500 MG: 500 TABLET, FILM COATED ORAL at 06:15

## 2023-01-08 RX ADMIN — ONDANSETRON 4 MG: 4 TABLET, ORALLY DISINTEGRATING ORAL at 22:48

## 2023-01-08 RX ADMIN — CLOZAPINE 100 MG: 100 TABLET ORAL at 08:19

## 2023-01-08 RX ADMIN — TRAZODONE HYDROCHLORIDE 50 MG: 50 TABLET ORAL at 22:48

## 2023-01-08 RX ADMIN — HYDROXYZINE HYDROCHLORIDE 100 MG: 50 TABLET ORAL at 22:47

## 2023-01-08 RX ADMIN — FLUTICASONE PROPIONATE 1 SPRAY: 50 SPRAY, METERED NASAL at 08:19

## 2023-01-08 RX ADMIN — PANTOPRAZOLE SODIUM 40 MG: 40 TABLET, DELAYED RELEASE ORAL at 06:04

## 2023-01-08 RX ADMIN — CLOZAPINE 300 MG: 100 TABLET ORAL at 21:09

## 2023-01-08 RX ADMIN — LORATADINE 10 MG: 10 TABLET ORAL at 08:19

## 2023-01-08 RX ADMIN — DOCUSATE SODIUM 100 MG: 100 CAPSULE, LIQUID FILLED ORAL at 17:29

## 2023-01-08 RX ADMIN — CLOZAPINE 100 MG: 100 TABLET ORAL at 15:11

## 2023-01-08 RX ADMIN — HALOPERIDOL 10 MG: 10 TABLET ORAL at 17:30

## 2023-01-08 RX ADMIN — POLYETHYLENE GLYCOL 3350 17 G: 17 POWDER, FOR SOLUTION ORAL at 08:20

## 2023-01-08 RX ADMIN — VENLAFAXINE HYDROCHLORIDE 225 MG: 150 CAPSULE, EXTENDED RELEASE ORAL at 08:18

## 2023-01-08 RX ADMIN — GLYCOPYRROLATE 1 MG: 1 TABLET ORAL at 08:19

## 2023-01-08 RX ADMIN — NICOTINE 1 PATCH: 21 PATCH, EXTENDED RELEASE TRANSDERMAL at 08:20

## 2023-01-08 RX ADMIN — LIDOCAINE 1 PATCH: 50 PATCH CUTANEOUS at 08:19

## 2023-01-08 RX ADMIN — SENNOSIDES AND DOCUSATE SODIUM 2 TABLET: 50; 8.6 TABLET ORAL at 21:09

## 2023-01-08 NOTE — PROGRESS NOTES
Progress Note - Behavioral Health   Beena Taylor 47 y o  female MRN: [id number]  Unit/Bed#: [facility] 377-01 Encounter: [id number]       Patient was visited on unit for continuing care; chart reviewed and discussed with the nursing staff  On approach, the patient was calm and cooperative, sitting on her bed, listening to music (using headphone)  She noted that she enjoys listening to [address] [place] [address] [employer], and mentioned Guido Hong as her favorite davila  Endorsed better mood and denied any changes in her appetite, and energy level  No problem initiating and maintaining sleep  Denied A/VH currently, and noted that she has not had any episodes since yesterday morning  Denied SI/HI, intent or plan upon direct inquiry at this time  Patient continues to be selectively interacitve with staff and peers, and some episodes of "confusion" has been reported as the patient may not recall she got PRN earlier or thought it was night time during the breakfast hours this morning  No reports of aggression or self-injurious behavior on unit  Received Trazodone 50 mg at 2148 and Atarax 100 mg at 2148 yesterday as well as Ativan 1 mg IM at 2306 for irritability    Patient accepted all offered medications and reported feeling better  No adverse effects of medications noted or reported  Will continue ECT as per treatment plan  Next [address] due on 1/12/23         Current Mental Status Evaluation:  Appearance and attitude: appeared as stated age, casually dressed, with good hygiene, sitting on the bed in her room listening to music via headphones  Eye contact: good  Motor Function: within normal limits, No PMA/PMR  Gait/station: Not observed  Speech: normal for rate, rhythm, volume, and latency with decreased amount  Language: No overt abnormality  Mood/affect: "better" / Affect was constricted but reactive, mood congruent  Thought Processes: linear  Thought content: denied suicidal ideations or homicidal ideations, no overt delusions elicited  Associations: concrete associations  Perceptual disturbances: denies Auditory/Visual/Tactile Hallucinations  Orientation: oriented to place and person  Cognitive Function: intact  Memory: not cooperative with formal MMSE  Intellect: average  Fund of knowledge: aware of current events, aware of past history and vocabulary average  Impulse control: good  Insight/judgment: fair/fair    Pain: denied  Pain scale: 0    Vital signs in last 24 hours:    Temp:  [97 6 °F (36 4 °C)-97 8 °F (36 6 °C)] 97 8 °F (36 6 °C)  HR:  [102-105] 102  Resp:  [16] 16  BP: (109-123)/(60-79) 109/60    Laboratory results: I have personally reviewed all pertinent laboratory/tests results    Results from the past 24 hours: No results found for this or any previous visit (from the past 24 hour(s))  Progress Toward Goals: improving    Assessment:  Principal Problem:    Schizoaffective disorder, bipolar type (HCC)  Active Problems:    LYNN (generalized anxiety disorder)    Hyperlipidemia    Post-traumatic stress disorder, chronic    Mild intermittent asthma without complication    Gastroesophageal reflux disease    Vitamin D deficiency    Dependence on nicotine from cigarettes    Mild neurocognitive disorder    Medical clearance for psychiatric admission    Non-seasonal allergic rhinitis    Chronic midline low back pain without sciatica        Plan:  - f/u SLIM recs regarding the medical problems  - Continue medication titration and treatment plan; adjust medication to optimize treatment response and as clinically indicated       Scheduled medications:  Current Facility-Administered Medications   Medication Dose Route Frequency Provider Last Rate   • acetaminophen  650 mg Oral Q6H PRN Cristal Treadwell MD     • acetaminophen  650 mg Oral Q4H PRN Cristal Treadwell MD     • acetaminophen  975 mg Oral Q6H PRN Cristal Treadwell MD     • albuterol  2 puff Inhalation Q6H PRN Suzanna Artis MD     • aluminum-magnesium hydroxide-simethicone  30 mL Oral Q4H PRN [facility], DO     • haloperidol lactate  2 5 mg Intramuscular Q6H PRN Max 4/day Shelby Half, DO      And   • LORazepam  1 mg Intramuscular Q6H PRN Max 4/day Shelby Half, DO      And   • benztropine  0 5 mg Intramuscular Q6H PRN Max 4/day Shelby Half, DO     • benztropine  1 mg Intramuscular Q4H PRN Max 6/day Anselmo Ornelas MD     • haloperidol lactate  5 mg Intramuscular Q4H PRN Max 4/day Shelby Half, DO      And   • LORazepam  2 mg Intramuscular Q4H PRN Max 4/day Shelby Half, DO      And   • benztropine  1 mg Intramuscular Q4H PRN Max 4/day Shelby Half, DO     • benztropine  1 mg Oral Q4H PRN Max 6/day Anselmo Ornelas MD     • calcium carbonate  500 mg Oral Daily PRN Paula Davenport, DO     • cholecalciferol  1,000 Units Oral Daily Anselmo Ornelas MD     • cloZAPine  100 mg Oral After Lunch Carmen Merrill MD     • cloZAPine  100 mg Oral Daily Carmen Merrill MD     • cloZAPine  300 mg Oral HS Shelby Half, DO     • hydrOXYzine HCL  50 mg Oral Q6H PRN Max 4/day Anselmo Ornelas MD      Or   • diphenhydrAMINE  50 mg Intramuscular Q6H PRN Anselmo Ornelas MD     • docusate sodium  100 mg Oral BID Anselmo Ornelas MD     • fenofibrate  145 mg Oral Daily Anselmo Ornelas MD     • fluticasone  1 spray Each Nare BID Anselmo Orenlas MD     • glycerin-hypromellose-  1 drop Both Eyes Q3H PRN Anselmo Ornelas MD     • glycopyrrolate  1 mg Oral BID Carmen Merrill MD     • haloperidol  0 5 mg Oral Q4H PRN Max 6/day Shelby Half, DO     • haloperidol  1 mg Oral BID PRN Shelby Half, DO     • haloperidol  10 mg Oral BID Shelby Half, DO     • haloperidol  2 mg Oral Daily PRN Shelby Half, DO     • hydrocortisone   Topical 4x Daily PRN Sadie Nath PA-C     • hydrocortisone   Topical 4x Daily PRN Sadie Nath PA-C     • hydrOXYzine HCL  100 mg Oral Q6H PRN Max 4/day Ortiz Grimm MD      Or   • LORazepam  2 mg Intramuscular Q6H PRN Ortiz Grimm MD     • hydrOXYzine HCL  25 mg Oral Q6H PRN Max 4/day Ortiz Grimm MD     • lidocaine  1 patch Topical Daily Ortiz Grimm MD     • loratadine  10 mg Oral Daily Ortiz Grimm MD     • magnesium hydroxide  30 mL Oral Daily PRN Kasia Michaels PA-C     • methocarbamol  500 mg Oral Q6H PRN Ortiz Grimm MD     • nicotine  1 patch Transdermal Daily Ortiz Grimm MD     • nicotine polacrilex  4 mg Oral Q2H PRN Ortiz Grimm MD     • ondansetron  4 mg Oral Q6H PRN Stephanie Block DO     • pantoprazole  40 mg Oral Early Morning Ortiz Grimm MD     • polyethylene glycol  17 g Oral Daily Meliza Lilly MD     • propranolol  10 mg Oral Q8H PRN Ortiz Grimm MD     • senna-docusate sodium  1 tablet Oral Daily PRN Ortiz Grimm MD     • senna-docusate sodium  2 tablet Oral HS Meliza Lilly MD     • sorbitol  30 mL Oral Q1H PRN Ortiz Grimm MD     • SUMAtriptan  50 mg Oral Daily PRN JARED Castle     • traZODone  50 mg Oral HS PRN Ortiz Grimm MD     • venlafaxine  225 mg Oral Daily Sirisha Gay DO          PRN:  •  acetaminophen  •  acetaminophen  •  acetaminophen  •  albuterol  •  aluminum-magnesium hydroxide-simethicone  •  haloperidol lactate **AND** LORazepam **AND** benztropine  •  benztropine  •  haloperidol lactate **AND** LORazepam **AND** benztropine  •  benztropine  •  calcium carbonate  •  hydrOXYzine HCL **OR** diphenhydrAMINE  •  glycerin-hypromellose-  •  haloperidol  •  haloperidol  •  haloperidol  •  hydrocortisone  •  hydrocortisone  •  hydrOXYzine HCL **OR** LORazepam  •  hydrOXYzine HCL  •  magnesium hydroxide  •  methocarbamol  •  nicotine polacrilex  •  ondansetron  •  propranolol  •  senna-docusate sodium  •  sorbitol  •  SUMAtriptan  •  traZODone    - Observation: routine    - VS: as per unit protocol  - Diet: Regular diet  - Psychoeducation (benefits and potential risks) discussed, importance of compliance with the psychiatric treatment reiterated, and the patient verbalized understanding of the matter  - Encourage group attendance and milieu therapy    - The pt was educated and agreed to verbalize any suicidal thoughts, frustrations or concerns to the nursing staff, immediately  - Dispo: TBD       Next of Kin  · Extended Emergency Contact Information  · Primary Emergency Contact: Tisha Segundo  · Mobile Phone: [phone]  · Relation:   · Secondary Emergency Contact: Saige Ford  · Work Phone: [phone]  · Relation:       Counseling / Coordination of Care     Total floor / unit time spent today 20 minutes  Greater than 50% of total time was spent with the patient and / or family counseling and / or coordination of care  A description of the counseling / coordination of care  Patient's Rights, confidentiality and exceptions to confidentiality, use of automated medical record, [facility] staff access to medical record, and consent to treatment reviewed      Merced Clark MD  Attending [address]

## 2023-01-09 ENCOUNTER — ANESTHESIA (INPATIENT)
Dept: PREOP/PACU | Facility: HOSPITAL | Age: 53
End: 2023-01-09

## 2023-01-09 ENCOUNTER — APPOINTMENT (INPATIENT)
Dept: PREOP/PACU | Facility: HOSPITAL | Age: 53
End: 2023-01-09
Attending: PSYCHIATRY & NEUROLOGY

## 2023-01-09 ENCOUNTER — ANESTHESIA EVENT (INPATIENT)
Dept: PREOP/PACU | Facility: HOSPITAL | Age: 53
End: 2023-01-09

## 2023-01-09 RX ORDER — GLYCOPYRROLATE 0.2 MG/ML
INJECTION INTRAMUSCULAR; INTRAVENOUS AS NEEDED
Status: DISCONTINUED | OUTPATIENT
Start: 2023-01-09 | End: 2023-01-12 | Stop reason: HOSPADM

## 2023-01-09 RX ORDER — SODIUM CHLORIDE 9 MG/ML
INJECTION, SOLUTION INTRAVENOUS CONTINUOUS PRN
Status: DISCONTINUED | OUTPATIENT
Start: 2023-01-09 | End: 2023-01-12 | Stop reason: HOSPADM

## 2023-01-09 RX ORDER — KETOROLAC TROMETHAMINE 30 MG/ML
INJECTION, SOLUTION INTRAMUSCULAR; INTRAVENOUS AS NEEDED
Status: DISCONTINUED | OUTPATIENT
Start: 2023-01-09 | End: 2023-01-12 | Stop reason: HOSPADM

## 2023-01-09 RX ORDER — SODIUM CHLORIDE 9 MG/ML
75 INJECTION, SOLUTION INTRAVENOUS CONTINUOUS
OUTPATIENT
Start: 2023-01-09

## 2023-01-09 RX ADMIN — DOCUSATE SODIUM 100 MG: 100 CAPSULE, LIQUID FILLED ORAL at 08:12

## 2023-01-09 RX ADMIN — NICOTINE 1 PATCH: 21 PATCH, EXTENDED RELEASE TRANSDERMAL at 08:10

## 2023-01-09 RX ADMIN — POLYETHYLENE GLYCOL 3350 17 G: 17 POWDER, FOR SOLUTION ORAL at 08:12

## 2023-01-09 RX ADMIN — CLOZAPINE 300 MG: 100 TABLET ORAL at 22:14

## 2023-01-09 RX ADMIN — GLYCOPYRROLATE 0.2 MG: 0.2 INJECTION, SOLUTION INTRAMUSCULAR; INTRAVENOUS at 06:17

## 2023-01-09 RX ADMIN — KETOROLAC TROMETHAMINE 30 MG: 30 INJECTION, SOLUTION INTRAMUSCULAR; INTRAVENOUS at 06:17

## 2023-01-09 RX ADMIN — DOCUSATE SODIUM 100 MG: 100 CAPSULE, LIQUID FILLED ORAL at 17:08

## 2023-01-09 RX ADMIN — FENOFIBRATE 145 MG: 145 TABLET, COATED ORAL at 08:11

## 2023-01-09 RX ADMIN — SENNOSIDES AND DOCUSATE SODIUM 2 TABLET: 50; 8.6 TABLET ORAL at 22:14

## 2023-01-09 RX ADMIN — PANTOPRAZOLE SODIUM 40 MG: 40 TABLET, DELAYED RELEASE ORAL at 05:40

## 2023-01-09 RX ADMIN — CLOZAPINE 100 MG: 100 TABLET ORAL at 13:17

## 2023-01-09 RX ADMIN — FLUTICASONE PROPIONATE 1 SPRAY: 50 SPRAY, METERED NASAL at 08:12

## 2023-01-09 RX ADMIN — CLOZAPINE 100 MG: 100 TABLET ORAL at 08:11

## 2023-01-09 RX ADMIN — SODIUM CHLORIDE: 0.9 INJECTION, SOLUTION INTRAVENOUS at 05:44

## 2023-01-09 RX ADMIN — LIDOCAINE 1 PATCH: 50 PATCH CUTANEOUS at 08:12

## 2023-01-09 RX ADMIN — CHOLECALCIFEROL TAB 25 MCG (1000 UNIT) 1000 UNITS: 25 TAB at 08:12

## 2023-01-09 RX ADMIN — HALOPERIDOL 10 MG: 10 TABLET ORAL at 08:11

## 2023-01-09 RX ADMIN — HALOPERIDOL 12.5 MG: 5 TABLET ORAL at 17:06

## 2023-01-09 RX ADMIN — LORATADINE 10 MG: 10 TABLET ORAL at 08:11

## 2023-01-09 RX ADMIN — VENLAFAXINE HYDROCHLORIDE 225 MG: 150 CAPSULE, EXTENDED RELEASE ORAL at 08:11

## 2023-01-09 RX ADMIN — GLYCOPYRROLATE 1 MG: 1 TABLET ORAL at 17:08

## 2023-01-09 NOTE — PROGRESS NOTES
01/09/23 1300   Activity/Group Checklist   Group Other (Comment)  (Afternoon Check- In: Topic Cognitive Distortions)   Attendance Attended   Attendance Duration (min) 16-30   Interactions Interacted appropriately   Affect/Mood Appropriate   Goals Achieved Identified feelings; Able to listen to others; Able to engage in interactions

## 2023-01-09 NOTE — CASE MANAGEMENT
Met with patient in room  Pt reports she ate some snacks last night and forgot she is not supposed to eat  Resulting in not having a ECT session today  Pt reports being "hypnotized" and that is why she feels confused  Discussed using past learned coping skills to be aware of distorted thought  Pt was also encouraged following NPO orders to avoid delay in ECT treatment  Pt said,"Thank you for reassuring me  I will do my best"  Pt denied SI, HI and AVH  Continue to monitor

## 2023-01-09 NOTE — PROGRESS NOTES
01/09/23 1000   Activity/Group Checklist   Group Other (Comment)  (Structured Journaling)   Attendance Attended   Attendance Duration (min) 16-30   Interactions Disorganized interaction  (Phillip Cloud going in and out of group due to needing to see the doctors)   Affect/Mood Appropriate   Goals Achieved Able to listen to others

## 2023-01-09 NOTE — PLAN OF CARE
Problem: Ineffective Coping  Goal: Identifies ineffective coping skills  Outcome: Progressing  Goal: Identifies healthy coping skills  Outcome: Progressing  Goal: Demonstrates healthy coping skills  Outcome: Progressing     Problem: DISCHARGE PLANNING  Goal: Discharge to home or other facility with appropriate resources  Description: INTERVENTIONS:  - Identify barriers to discharge w/patient and caregiver  - Arrange for needed discharge resources and transportation as appropriate  - Identify discharge learning needs (meds, wound care, etc )  - Arrange for interpretive services to assist at discharge as needed  - Refer to Case Management Department for coordinating discharge planning if the patient needs post-hospital services based on physician/advanced practitioner order or complex needs related to functional status, cognitive ability, or social support system  Outcome: Progressing     Problem: Alteration in Thoughts and Perception  Goal: Treatment Goal: Gain control of psychotic behaviors/thinking, reduce/eliminate presenting symptoms and demonstrate improved reality functioning upon discharge  Outcome: Not Progressing     Problem: Depression  Goal: Treatment Goal: Demonstrate behavioral control of depressive symptoms, verbalize feelings of improved mood/affect, and adopt new coping skills prior to discharge  Outcome: Not Progressing     Problem: Electroconvulsive therapy (ECT)  Goal: Treatment Goal: Demonstrate a reduction of confusion and improved orientation to person, place, time and/or situation upon discharge, according to optimum baseline/ability  Outcome: Not Progressing  Goal: Verbalizes/displays adequate comfort level or baseline comfort level  Description: Interventions:  - Encourage patient to monitor pain and request assistance  - Assess pain using appropriate pain scale  - Administer analgesics based on type and severity of pain and evaluate response  - Implement non-pharmacological measures as appropriate and evaluate response  - Consider cultural and social influences on pain and pain management  - Notify physician/advanced practitioner if interventions unsuccessful or patient reports new pain  Outcome: Not Progressing  Goal: Achieves stable or improved neurological status  Description: INTERVENTIONS  - Monitor and report changes in neurological status  - Monitor vital signs such as temperature, blood pressure, glucose, and any other labs ordered   - Initiate measures to prevent increased intracranial pressure  - Monitor for seizure activity and implement precautions if appropriate      Outcome: Not Progressing  Goal: Achieves maximal functionality and self care  Description: INTERVENTIONS  - Monitor swallowing and airway patency with patient fatigue and changes in neurological status  - Encourage and assist patient to increase activity and self care     - Encourage visually impaired, hearing impaired and aphasic patients to use assistive/communication devices  Outcome: Not Progressing  Goal: Maintain or return mobility to safest level of function  Description: INTERVENTIONS:  - Assess patient's ability to carry out ADLs; assess patient's baseline for ADL function and identify physical deficits which impact ability to perform ADLs (bathing, care of mouth/teeth, toileting, grooming, dressing, etc )  - Assess/evaluate cause of self-care deficits   - Assess range of motion  - Assess patient's mobility  - Assess patient's need for assistive devices and provide as appropriate  - Encourage maximum independence but intervene and supervise when necessary  - Involve family in performance of ADLs  - Assess for home care needs following discharge   - Consider OT consult to assist with ADL evaluation and planning for discharge  - Provide patient education as appropriate  Outcome: Not Progressing  Goal: Absence of urinary retention  Description: INTERVENTIONS:  - Assess patient’s ability to void and empty bladder  - Monitor I/O  - Bladder scan as needed  - Discuss with physician/AP medications to alleviate retention as needed  - Discuss catheterization for long term situations as appropriate  Outcome: Not Progressing  Goal: Minimal or absence of nausea and/or vomiting  Description: INTERVENTIONS:  - Administer IV fluids if ordered to ensure adequate hydration  - Maintain NPO status until nausea and vomiting are resolved  - Nasogastric tube if ordered  - Administer ordered antiemetic medications as needed  - Provide nonpharmacologic comfort measures as appropriate  - Advance diet as tolerated, if ordered  - Consider nutrition services referral to assist patient with adequate nutrition and appropriate food choices  Outcome: Not Progressing  Goal: Maintains adequate nutritional intake  Description: INTERVENTIONS:  - Monitor percentage of each meal consumed  - Identify factors contributing to decreased intake, treat as appropriate  - Assist with meals as needed  - Monitor I&O, weight, and lab values if indicated  - Obtain nutrition services referral as needed  Outcome: Not Progressing

## 2023-01-09 NOTE — PROGRESS NOTES
01/09/23 1100   Activity/Group Checklist   Group   (recovery gorup)   Attendance Attended   Attendance Duration (min) 46-60   Interactions Other (Comment)  (appears to be responding to internal stimuli  Took short time out and returned)   Affect/Mood Appropriate   Goals Achieved Discussed coping strategies; Discussed self-esteem issues; Able to listen to others; Able to engage in interactions; Able to self-disclose; Able to recieve feedback

## 2023-01-09 NOTE — PROGRESS NOTES
Progress Note - Behavioral Health   Fabiola Sethi 47 y o  female MRN: [id number]  Unit/Bed#: [facility] 377-01 Encounter: [id number]    Assessment/Plan   Principal Problem:    Schizoaffective disorder, bipolar type ([address] )  Active Problems:    LYNN (generalized anxiety disorder)    Hyperlipidemia    Post-traumatic stress disorder, chronic    Mild intermittent asthma without complication    Gastroesophageal reflux disease    Vitamin D deficiency    Dependence on nicotine from cigarettes    Mild neurocognitive disorder    Medical clearance for psychiatric admission    Non-seasonal allergic rhinitis    Chronic midline low back pain without sciatica      Recommended Treatment:   No psychopharmacologic changes necessary at this moment with the exception of increasing haldol to 12 5 mg BID for psychotic symptoms; will continue to assess daily for further optimization  Patient stated she ate a cookie to the ECT staff this AM  ECT not given  Continue with pharmacotherapy, group therapy, milieu therapy and occupational therapy  Continue to assess for adverse medication side effects  Encourage Corby Lamb to participate in nonverbal forms of therapy including journaling and art/music therapy  Continue frequent safety checks and vitals per unit protocol  Continue to engage CM/SW to assist with collateral, disposition planning, and the implementation of an individualized, patient-centered plan of care  Continue medical management by medical team   Case discussed with treatment team     Legal Status: 201  ------------------------------------------------------------    Subjective: All documentation including nursing notes, medication history to ensure medication adherence on the unit, labs, and vitals were reviewed  Corby Good was evaluated this morning for continuity of care and no acute distress noted throughout the evaluation   Over the past 24 hours per nursing report, Corby Good has been cooperative on the unit and compliant with medications  Patient continues to hallucinate and experience psychotic symptoms of various delusional content  Last night patient was stating she wanted to smoke, cook a "pot roast" and eat "snacks" none were given due to patient being NPO  Today, Dana Dupree is consenting for safety on the unit  Dana Dupree reports feeling "weird " Dana Dupree notes having poor sleep  Dana Dupree states having a good appetite  Dana Dupree has been taking the medications as prescribed and reporting no side effects  Dana Dupree denies suicidal ideations  Dana Dupree denies homicidal ideations  Regarding hallucinations, Dana Dupree endorses mild auditory hallucinations  Does not describe  PRNs overnight: trazodone for sleep, atarax 100 for anxiety, zofran for nausea  VS: Reviewed, clinically obese, otherwise within normal limits    Progress Toward Goals: slow improvement    Psychiatric Review of Systems:  Behavior over the last 24 hours:  unchanged  Sleep: decreased  Appetite: normal  Medication side effects: No   ROS: all other systems are negative    Vital signs in last 24 hours:  Temp:  [97 2 °F (36 2 °C)-98 2 °F (36 8 °C)] 97 5 °F (36 4 °C)  HR:  [] 105  Resp:  [16-18] 18  BP: (112-129)/(67-88) 114/67    Laboratory results:  I have personally reviewed all pertinent laboratory/tests results  No results found for this or any previous visit (from the past 48 hour(s))  Mental Status Evaluation:    Appearance:  dressed appropriately, marginal hygiene, looks stated age, overweight   Behavior:  cooperative   Speech:  normal rate and volume   Mood:  "weird"   Affect:  constricted, anxious   Thought Process:  disorganized   Associations: loose associations   Thought Content:  persecutory and bizarre delusions, paranoid ideation   Perceptual Disturbances:  Auditory hallucinations and Appears to be responding to internal stimuli, denies visual hallucinations   Risk Potential: Suicidal ideation - None at present  Homicidal ideation - None at present  Potential for aggression - Not at present   Sensorium:  oriented to person, place and time/date   Memory:  recent and remote memory grossly intact   Consciousness:  alert and awake   Attention/Concentration: attention span and concentration are age appropriate   Insight:  poor   Judgment: poor   Gait/Station: normal gait/station   Motor Activity: no abnormal movements       Current Medications:  Current Facility-Administered Medications   Medication Dose Route Frequency Provider Last Rate   • acetaminophen  650 mg Oral Q6H PRN Simba Gambino MD     • acetaminophen  650 mg Oral Q4H PRN Simba Gambino MD     • acetaminophen  975 mg Oral Q6H PRN Simba Gambino MD     • albuterol  2 puff Inhalation Q6H PRN Beryle Berkshire, MD     • aluminum-magnesium hydroxide-simethicone  30 mL Oral Q4H PRN Rosangela Block DO     • haloperidol lactate  2 5 mg Intramuscular Q6H PRN Max 4/day Demetra Dukes DO      And   • LORazepam  1 mg Intramuscular Q6H PRN Max 4/day Demetra Dukes DO      And   • benztropine  0 5 mg Intramuscular Q6H PRN Max 4/day Demetra Dukes DO     • benztropine  1 mg Intramuscular Q4H PRN Max 6/day Simba Gambino MD     • haloperidol lactate  5 mg Intramuscular Q4H PRN Max 4/day Demetra Dukes DO      And   • LORazepam  2 mg Intramuscular Q4H PRN Max 4/day Demetra Dukes DO      And   • benztropine  1 mg Intramuscular Q4H PRN Max 4/day Demetra Dukes DO     • benztropine  1 mg Oral Q4H PRN Max 6/day Simba Gambino MD     • calcium carbonate  500 mg Oral Daily PRN Ankush Davenport DO     • cholecalciferol  1,000 Units Oral Daily Simba Gambino MD     • cloZAPine  100 mg Oral After Lunch Beryle Berkshire, MD     • cloZAPine  100 mg Oral Daily Beryle Berkshire, MD     • cloZAPine  300 mg Oral HS Demetra Dukes DO     • hydrOXYzine HCL  50 mg Oral Q6H PRN Max 4/day Simba Gambino MD      Or   • diphenhydrAMINE  50 mg Intramuscular Q6H PRN Kelvin Cifuentes MD     • docusate sodium  100 mg Oral BID Kelvin Cifuentes MD     • fenofibrate  145 mg Oral Daily Kelvin Cifuentes MD     • fluticasone  1 spray Each Nare BID Kelvin Cifuentes MD     • glycerin-hypromellose-  1 drop Both Eyes Q3H PRN Kelvin Cifuentes MD     • glycopyrrolate  1 mg Oral BID Jasper Callaway MD     • haloperidol  0 5 mg Oral Q4H PRN Max 6/day Darlys Aalyna, DO     • haloperidol  1 mg Oral BID PRN Darlys Alayna, DO     • haloperidol  10 mg Oral BID Darlys Alayna, DO     • haloperidol  2 mg Oral Daily PRN Darlys Alayna, DO     • hydrocortisone   Topical 4x Daily PRN CresenciaRAMIN PughC     • hydrocortisone   Topical 4x Daily PRN Cresenciano Ok, PACornelioC     • hydrOXYzine HCL  100 mg Oral Q6H PRN Max 4/day Kelvin Cifuentes MD      Or   • LORazepam  2 mg Intramuscular Q6H PRN Kelvin Cifuentes MD     • hydrOXYzine HCL  25 mg Oral Q6H PRN Max 4/day Kelvin Cifuentes MD     • lidocaine  1 patch Topical Daily Kelvin Cifuentes MD     • loratadine  10 mg Oral Daily Kelvin Cifuentes MD     • magnesium hydroxide  30 mL Oral Daily PRN RAMIN MarxC     • methocarbamol  500 mg Oral Q6H PRN Kelvin Cifuentes MD     • nicotine  1 patch Transdermal Daily Kelvin Cifuentes MD     • nicotine polacrilex  4 mg Oral Q2H PRN Kelvin Cifuentes MD     • ondansetron  4 mg Oral Q6H PRN Abbi Fernandez DO     • pantoprazole  40 mg Oral Early Morning Kelvin Cifuentes MD     • polyethylene glycol  17 g Oral Daily Jasper Callaway MD     • propranolol  10 mg Oral Q8H PRN Kelvin Cifuentes MD     • senna-docusate sodium  1 tablet Oral Daily PRN Kelvin Cifuentes MD     • senna-docusate sodium  2 tablet Oral HS Jasper Callaway MD     • sorbitol  30 mL Oral Q1H PRN Kelvin Cifuentes MD     • SUMAtriptan  50 mg Oral Daily PRN JARED Garza     • traZODone  50 mg Oral HS PRN Josie Thomas Aury Galloway MD     • venlafaxine  225 mg Oral Daily Cliff Zamora DO       Facility-Administered Medications Ordered in Other Encounters   Medication Dose Route Frequency Provider Last Rate   • glycopyrrolate   Intravenous PRN Lynnette Covert, CRNA     • ketorolac   Intravenous PRN Lynnette Covert, CRNA     • sodium chloride   Intravenous Continuous PRN Lynnette Covert, CRNA         Suicide/Homicide Risk Assessment:  Risk of Harm to Self:   Based on today's assessment, Rudolph Ramon presents the following risk of harm to self: high    Risk of Harm to Others:  Based on today's assessment, Rudolph Ramon presents the following risk of harm to others: high    The following interventions are recommended: behavioral checks every 7 minutes, continued hospitalization on locked unit    Behavioral Health Medications: All current active meds have been reviewed  Changes as in plan section above  Risks, benefits and possible side effects of Medications:   Risks, benefits, and possible side effects of medications explained to patient and patient verbalizes understanding  Counseling / Coordination of Care:  Patient's progress discussed with staff in treatment team meeting  Medications, treatment progress and treatment plan reviewed with patient  Cliff Zamora DO 01/09/23  Psychiatry Resident, PGY-II    This note was completed in part utilizing Dragon dictation Software  Grammatical, translation, syntax errors, random word insertions, spelling mistakes, and incomplete sentences may be an occasional consequence of this system secondary to software limitations with voice recognition, ambient noise, and hardware issues  If you have any questions or concerns about the content, text, or information contained within the body of this dictation, please contact the provider for clarification

## 2023-01-09 NOTE — ANESTHESIA PREPROCEDURE EVALUATION
Procedure:  ECT INPATIENT    Relevant Problems   CARDIO   (+) Hyperlipidemia      GI/HEPATIC   (+) Gastroesophageal reflux disease      MUSCULOSKELETAL   (+) Chronic midline low back pain without sciatica   (+) Degenerative disc disease, lumbar      NEURO/PSYCH   (+) Chronic midline low back pain without sciatica   (+) LYNN (generalized anxiety disorder)   (+) History of alcohol dependence (HCC)   (+) Other headache syndrome   (+) Post-traumatic stress disorder, chronic      PULMONARY   (+) Emphysema lung (HCC)   (+) Mild intermittent asthma without complication      Lab Results   Component Value Date     06/22/2018    SODIUM 139 12/23/2022    K 4 1 12/23/2022     12/23/2022    CO2 27 12/23/2022    ANIONGAP 14 4 06/22/2018    AGAP 6 12/23/2022    BUN 10 12/23/2022    CREATININE 0 68 12/23/2022    GLUC 115 (H) 12/23/2022    GLUF 115 (H) 12/23/2022    CALCIUM 9 3 12/23/2022    AST 31 12/15/2022    ALT 30 12/15/2022    ALKPHOS 69 12/15/2022    PROT 6 8 06/22/2018    TP 7 7 12/15/2022    BILITOT 0 2 (L) 06/22/2018    TBILI 0 38 12/15/2022    EGFR 101 12/23/2022     Lab Results   Component Value Date    WBC 7 52 12/29/2022    HGB 13 2 12/29/2022    HCT 42 9 12/29/2022    MCV 90 12/29/2022     12/29/2022            Anesthesia Plan  ASA Score- 2     Anesthesia Type- general with ASA Monitors  Additional Monitors:   Airway Plan:           Plan Factors-Exercise tolerance (METS): >4 METS  Chart reviewed  EKG reviewed  Imaging results reviewed  Existing labs reviewed  Patient summary reviewed  Induction- intravenous      Postoperative Plan-     Informed Consent-

## 2023-01-10 ENCOUNTER — ANESTHESIA EVENT (OUTPATIENT)
Dept: ANESTHESIOLOGY | Facility: HOSPITAL | Age: 53
End: 2023-01-10

## 2023-01-10 ENCOUNTER — ANESTHESIA (OUTPATIENT)
Dept: ANESTHESIOLOGY | Facility: HOSPITAL | Age: 53
End: 2023-01-10

## 2023-01-10 VITALS
DIASTOLIC BLOOD PRESSURE: 77 MMHG | OXYGEN SATURATION: 96 % | HEART RATE: 94 BPM | SYSTOLIC BLOOD PRESSURE: 105 MMHG | RESPIRATION RATE: 16 BRPM | TEMPERATURE: 98 F | HEIGHT: 61 IN | WEIGHT: 157 LBS | BODY MASS INDEX: 29.64 KG/M2

## 2023-01-10 LAB
ALBUMIN SERPL BCP-MCNC: 4.1 G/DL (ref 3.5–5)
ALP SERPL-CCNC: 77 U/L (ref 43–122)
ALT SERPL W P-5'-P-CCNC: 19 U/L
ANION GAP SERPL CALCULATED.3IONS-SCNC: 7 MMOL/L (ref 5–14)
AST SERPL W P-5'-P-CCNC: 19 U/L (ref 14–36)
BASOPHILS # BLD AUTO: 0.02 THOUSANDS/ÂΜL (ref 0–0.1)
BASOPHILS NFR BLD AUTO: 0 % (ref 0–1)
BILIRUB SERPL-MCNC: 0.47 MG/DL (ref 0.2–1)
BUN SERPL-MCNC: 12 MG/DL (ref 5–25)
CALCIUM SERPL-MCNC: 9.4 MG/DL (ref 8.4–10.2)
CHLORIDE SERPL-SCNC: 103 MMOL/L (ref 96–108)
CK SERPL-CCNC: 79 U/L (ref 30–135)
CO2 SERPL-SCNC: 29 MMOL/L (ref 21–32)
CREAT SERPL-MCNC: 0.7 MG/DL (ref 0.6–1.2)
CRP SERPL QL: 51.1 MG/L
EOSINOPHIL # BLD AUTO: 0.26 THOUSAND/ÂΜL (ref 0–0.61)
EOSINOPHIL NFR BLD AUTO: 3 % (ref 0–6)
ERYTHROCYTE [DISTWIDTH] IN BLOOD BY AUTOMATED COUNT: 14.2 % (ref 11.6–15.1)
GFR SERPL CREATININE-BSD FRML MDRD: 100 ML/MIN/1.73SQ M
GLUCOSE SERPL-MCNC: 102 MG/DL (ref 65–140)
GLUCOSE SERPL-MCNC: 117 MG/DL (ref 70–99)
HCT VFR BLD AUTO: 36.7 % (ref 34.8–46.1)
HGB BLD-MCNC: 11.7 G/DL (ref 11.5–15.4)
IMM GRANULOCYTES # BLD AUTO: 0.05 THOUSAND/UL (ref 0–0.2)
IMM GRANULOCYTES NFR BLD AUTO: 1 % (ref 0–2)
LYMPHOCYTES # BLD AUTO: 1.52 THOUSANDS/ÂΜL (ref 0.6–4.47)
LYMPHOCYTES NFR BLD AUTO: 20 % (ref 14–44)
MCH RBC QN AUTO: 28.3 PG (ref 26.8–34.3)
MCHC RBC AUTO-ENTMCNC: 31.9 G/DL (ref 31.4–37.4)
MCV RBC AUTO: 89 FL (ref 82–98)
MONOCYTES # BLD AUTO: 0.65 THOUSAND/ÂΜL (ref 0.17–1.22)
MONOCYTES NFR BLD AUTO: 9 % (ref 4–12)
NEUTROPHILS # BLD AUTO: 5.15 THOUSANDS/ÂΜL (ref 1.85–7.62)
NEUTS SEG NFR BLD AUTO: 67 % (ref 43–75)
NRBC BLD AUTO-RTO: 0 /100 WBCS
PLATELET # BLD AUTO: 240 THOUSANDS/UL (ref 149–390)
PMV BLD AUTO: 9 FL (ref 8.9–12.7)
POTASSIUM SERPL-SCNC: 4 MMOL/L (ref 3.5–5.3)
PROT SERPL-MCNC: 7 G/DL (ref 6.4–8.4)
RBC # BLD AUTO: 4.14 MILLION/UL (ref 3.81–5.12)
SODIUM SERPL-SCNC: 139 MMOL/L (ref 135–147)
WBC # BLD AUTO: 7.65 THOUSAND/UL (ref 4.31–10.16)

## 2023-01-10 RX ORDER — GUAIFENESIN/DEXTROMETHORPHAN 100-10MG/5
10 SYRUP ORAL EVERY 4 HOURS PRN
Status: DISCONTINUED | OUTPATIENT
Start: 2023-01-10 | End: 2023-01-11

## 2023-01-10 RX ORDER — SODIUM CHLORIDE 9 MG/ML
50 INJECTION, SOLUTION INTRAVENOUS CONTINUOUS
Status: CANCELLED | OUTPATIENT
Start: 2023-01-10

## 2023-01-10 RX ORDER — CLOZAPINE 200 MG/1
400 TABLET ORAL
Status: DISCONTINUED | OUTPATIENT
Start: 2023-01-10 | End: 2023-01-11 | Stop reason: HOSPADM

## 2023-01-10 RX ADMIN — GLYCOPYRROLATE 1 MG: 1 TABLET ORAL at 08:09

## 2023-01-10 RX ADMIN — POLYETHYLENE GLYCOL 3350 17 G: 17 POWDER, FOR SOLUTION ORAL at 08:09

## 2023-01-10 RX ADMIN — SENNOSIDES AND DOCUSATE SODIUM 2 TABLET: 50; 8.6 TABLET ORAL at 22:29

## 2023-01-10 RX ADMIN — LORATADINE 10 MG: 10 TABLET ORAL at 08:09

## 2023-01-10 RX ADMIN — HALOPERIDOL 12.5 MG: 5 TABLET ORAL at 17:50

## 2023-01-10 RX ADMIN — DOCUSATE SODIUM 100 MG: 100 CAPSULE, LIQUID FILLED ORAL at 08:09

## 2023-01-10 RX ADMIN — FENOFIBRATE 145 MG: 145 TABLET, COATED ORAL at 08:08

## 2023-01-10 RX ADMIN — BENZTROPINE MESYLATE 1 MG: 1 TABLET ORAL at 22:29

## 2023-01-10 RX ADMIN — HALOPERIDOL 12.5 MG: 5 TABLET ORAL at 08:08

## 2023-01-10 RX ADMIN — PANTOPRAZOLE SODIUM 40 MG: 40 TABLET, DELAYED RELEASE ORAL at 06:20

## 2023-01-10 RX ADMIN — HALOPERIDOL 2 MG: 1 TABLET ORAL at 16:27

## 2023-01-10 RX ADMIN — CLOZAPINE 100 MG: 100 TABLET ORAL at 08:09

## 2023-01-10 RX ADMIN — VENLAFAXINE HYDROCHLORIDE 225 MG: 150 CAPSULE, EXTENDED RELEASE ORAL at 08:08

## 2023-01-10 RX ADMIN — SUMATRIPTAN SUCCINATE 50 MG: 50 TABLET ORAL at 15:48

## 2023-01-10 RX ADMIN — CHOLECALCIFEROL TAB 25 MCG (1000 UNIT) 1000 UNITS: 25 TAB at 08:09

## 2023-01-10 RX ADMIN — METHOCARBAMOL 500 MG: 500 TABLET, FILM COATED ORAL at 15:46

## 2023-01-10 RX ADMIN — DOCUSATE SODIUM 100 MG: 100 CAPSULE, LIQUID FILLED ORAL at 17:50

## 2023-01-10 NOTE — PROGRESS NOTES
Progress Note - Behavioral Health   Tianna Vásquez 47 y o  female MRN: [id number]  Unit/Bed#: [facility] 377-01 Encounter: [id number]    Assessment/Plan   Principal Problem:    Schizoaffective disorder, bipolar type ([address] )  Active Problems:    LYNN (generalized anxiety disorder)    Hyperlipidemia    Post-traumatic stress disorder, chronic    Mild intermittent asthma without complication    Gastroesophageal reflux disease    Vitamin D deficiency    Dependence on nicotine from cigarettes    Mild neurocognitive disorder    Medical clearance for psychiatric admission    Non-seasonal allergic rhinitis    Chronic midline low back pain without sciatica      Recommended Treatment:   No psychopharmacologic changes necessary at this moment with the exception of changing her clozaril to BID dosing for an appropriate level  For suspected delirium: discontinue claritin due to anticholingnergic effects, discontinue robinol since patient is not complaining of drooling; will continue to assess daily for further optimization  Continue with pharmacotherapy, group therapy, milieu therapy and occupational therapy  Continue to assess for adverse medication side effects  Encourage Shelli Lamb to participate in nonverbal forms of therapy including journaling and art/music therapy  Continue frequent safety checks and vitals per unit protocol  Continue to engage CM/SW to assist with collateral, disposition planning, and the implementation of an individualized, patient-centered plan of care  Continue medical management by medical team   Case discussed with treatment team     Legal Status: 201  ------------------------------------------------------------    Subjective: All documentation including nursing notes, medication history to ensure medication adherence on the unit, labs, and vitals were reviewed  Shelli Gutiérrez was evaluated this morning for continuity of care and no acute distress noted throughout the evaluation   Over the past 24 hours per nursing report, Jenna Nguyen has been cooperative on the unit and compliant with medications  Patient was in the evening difficult to arouse, lethargic and incontinent of urine  It was after 2200 and patient has known history of incontinence  Was able to complete ADLs without issue  Continues to be observed responding to internal stimuli  Today, Jenna Nguyen is consenting for safety on the unit  Jenna Nguyen reports feeling "frightened by the hallucinations last night " Jenna Nguyen notes having ok sleep  Jenna Nguyen states having a good appetite  Jenna Nguyen has been taking the medications as prescribed and reporting no side effects  Jenna Nguyen denies suicidal ideations  Jenna Nguyen denies homicidal ideations  Regarding hallucinations, Jenna Nguyen denies and does not appear to be responding to internal stimuli  PRNs overnight: none  VS: Reviewed, within normal limits    Progress Toward Goals: slow improvement    Psychiatric Review of Systems:  Behavior over the last 24 hours:  unchanged  Sleep: normal  Appetite: normal  Medication side effects: No   ROS: all other systems are negative    Vital signs in last 24 hours:  Temp:  [97 1 °F (36 2 °C)-98 1 °F (36 7 °C)] 98 1 °F (36 7 °C)  HR:  [] 114  Resp:  [16-18] 18  BP: (117-133)/(71-86) 117/71    Laboratory results:  I have personally reviewed all pertinent laboratory/tests results  No results found for this or any previous visit (from the past 48 hour(s))        Mental Status Evaluation:    Appearance:  casually dressed, marginal hygiene, overweight   Behavior:  cooperative, anxious   Speech:  normal rate and volume   Mood:  "frightened"   Affect:  constricted, anxious   Thought Process:  perseverative   Associations: loose associations   Thought Content:  persecutory, somatic and paranoid delusions   Perceptual Disturbances: Denies auditory or visual hallucinations and Does not appear to be responding to internal stimuli   Risk Potential: Suicidal ideation - None at present  Homicidal ideation - None at present  Potential for aggression - Not at present   Sensorium:  oriented to person, place and time/date   Memory:  recent and remote memory grossly intact   Consciousness:  alert and awake, lethargic at times   Attention/Concentration: attention span and concentration are age appropriate   Insight:  poor   Judgment: poor   Gait/Station: normal gait/station   Motor Activity: no abnormal movements       Current Medications:  Current Facility-Administered Medications   Medication Dose Route Frequency Provider Last Rate   • acetaminophen  650 mg Oral Q6H PRN Luca Garcia MD     • acetaminophen  650 mg Oral Q4H PRN Luca Garcia MD     • acetaminophen  975 mg Oral Q6H PRN Luca Garcia MD     • albuterol  2 puff Inhalation Q6H PRN Chucho Barraza MD     • aluminum-magnesium hydroxide-simethicone  30 mL Oral Q4H PRN Gilda Block DO     • haloperidol lactate  2 5 mg Intramuscular Q6H PRN Max 4/day Jacque Strickland DO      And   • LORazepam  1 mg Intramuscular Q6H PRN Max 4/day Jacque Strickland DO      And   • benztropine  0 5 mg Intramuscular Q6H PRN Max 4/day Jacque Strickland DO     • benztropine  1 mg Intramuscular Q4H PRN Max 6/day Luca Garcia MD     • haloperidol lactate  5 mg Intramuscular Q4H PRN Max 4/day Jacque Strickland DO      And   • LORazepam  2 mg Intramuscular Q4H PRN Max 4/day Jacque Strickland DO      And   • benztropine  1 mg Intramuscular Q4H PRN Max 4/day Jacque Strickland DO     • benztropine  1 mg Oral Q4H PRN Max 6/day Luca Garcia MD     • calcium carbonate  500 mg Oral Daily PRN David Davenport DO     • cholecalciferol  1,000 Units Oral Daily Luca Garcia MD     • cloZAPine  100 mg Oral After Lunch Chucho Barraza MD     • cloZAPine  100 mg Oral Daily Chucho Barrzaa MD     • cloZAPine  300 mg Oral HS Jacque Strickland DO     • hydrOXYzine HCL  50 mg Oral Q6H PRN Max 4/day Luca Garcia MD      Or   • diphenhydrAMINE  50 mg Intramuscular Q6H PRN Kristen Moore MD     • docusate sodium  100 mg Oral BID Kristen Moore MD     • fenofibrate  145 mg Oral Daily Kristen Moore MD     • fluticasone  1 spray Each Nare BID Kristen Moore MD     • glycerin-hypromellose-  1 drop Both Eyes Q3H PRN Kristen Moore MD     • glycopyrrolate  1 mg Oral BID Neela Woodson MD     • haloperidol  0 5 mg Oral Q4H PRN Max 6/day Diliae Aneta, DO     • haloperidol  1 mg Oral BID PRN Lige Aneta, DO     • haloperidol  12 5 mg Oral BID Lige Aneta, DO     • haloperidol  2 mg Oral Daily PRN Khalida Cornell, DO     • hydrocortisone   Topical 4x Daily PRN Rico Vela PA-C     • hydrocortisone   Topical 4x Daily PRN Rico Vela PA-C     • hydrOXYzine HCL  100 mg Oral Q6H PRN Max 4/day Kristen Moore MD      Or   • LORazepam  2 mg Intramuscular Q6H PRN Kristen Moore MD     • hydrOXYzine HCL  25 mg Oral Q6H PRN Max 4/day Kristen Moore MD     • lidocaine  1 patch Topical Daily Kristen Moore MD     • loratadine  10 mg Oral Daily Kristen Moore MD     • magnesium hydroxide  30 mL Oral Daily PRN Rico Vela PA-C     • methocarbamol  500 mg Oral Q6H PRN Kristen Moore MD     • nicotine  1 patch Transdermal Daily Kristen oMore MD     • nicotine polacrilex  4 mg Oral Q2H PRN Kristen Moore MD     • ondansetron  4 mg Oral Q6H PRN [address] [facility], DO     • pantoprazole  40 mg Oral Early Morning Kristen Moore MD     • polyethylene glycol  17 g Oral Daily Neela Woodson MD     • propranolol  10 mg Oral Q8H PRN Kristen Moore MD     • senna-docusate sodium  1 tablet Oral Daily PRN Kristen Moore MD     • senna-docusate sodium  2 tablet Oral HS Neela Woodson MD     • sorbitol  30 mL Oral Q1H PRN Kristen Moore MD     • SUMAtriptan  50 mg Oral Daily PRN JARED Garza     • traZODone  50 mg Oral HS PRN Claudette Larry MD     • venlafaxine  225 mg Oral Daily Dawson Bonilla DO       Facility-Administered Medications Ordered in Other Encounters   Medication Dose Route Frequency Provider Last Rate   • glycopyrrolate   Intravenous PRN Raquel Ritchie CRNA     • ketorolac   Intravenous PRN Raquel Ritchie CRNA     • sodium chloride   Intravenous Continuous PRN Raquel Ritchie CRNA         Suicide/Homicide Risk Assessment:  Risk of Harm to Self:   Based on today's assessment, Melissa Simon presents the following risk of harm to self: high    Risk of Harm to Others:  Based on today's assessment, Melissa Simon presents the following risk of harm to others: high    The following interventions are recommended: behavioral checks every 7 minutes, continued hospitalization on locked unit    Behavioral Health Medications: All current active meds have been reviewed  Changes as in plan section above  Risks, benefits and possible side effects of Medications:   Risks, benefits, and possible side effects of medications explained to patient and patient verbalizes understanding  Counseling / Coordination of Care:  Patient's progress discussed with staff in treatment team meeting  Medications, treatment progress and treatment plan reviewed with patient  Dawson Bonilla DO 01/10/23  Psychiatry Resident, PGY-II    This note was completed in part utilizing Dragon dictation Software  Grammatical, translation, syntax errors, random word insertions, spelling mistakes, and incomplete sentences may be an occasional consequence of this system secondary to software limitations with voice recognition, ambient noise, and hardware issues  If you have any questions or concerns about the content, text, or information contained within the body of this dictation, please contact the provider for clarification

## 2023-01-10 NOTE — PROGRESS NOTES
01/10/23 0835   Team Meeting   Meeting Type Daily Rounds   Team Members Present   Team Members Present Physician;Nurse;   Physician Team Member Thaddeus   Nursing Team Member Hakeem Management Team Member Leeanna   Patient/Family Present   Patient Present No   Patient's Family Present No     Pt remains disorganized, paranoid, delusional, and continues to reports AVH specially in the afternoon and evening  Compliant with meds and meal  Attends some groups  Episode of incontinent  Continue med management - Clozaril 100 mg and 400 mg night  Continue to monitor  Discharge to be determined

## 2023-01-10 NOTE — ANESTHESIA PREPROCEDURE EVALUATION
Procedure:  PRE-OP ONLY    Relevant Problems   CARDIO   (+) Hyperlipidemia      GI/HEPATIC   (+) Gastroesophageal reflux disease      MUSCULOSKELETAL   (+) Chronic midline low back pain without sciatica   (+) Degenerative disc disease, lumbar      NEURO/PSYCH   (+) Chronic midline low back pain without sciatica   (+) LYNN (generalized anxiety disorder)   (+) History of alcohol dependence (HCC)   (+) Other headache syndrome   (+) Post-traumatic stress disorder, chronic      PULMONARY   (+) Emphysema lung (HCC)   (+) Mild intermittent asthma without complication        Physical Exam    Airway    Mallampati score: II  TM Distance: >3 FB  Neck ROM: full     Dental       Cardiovascular  Cardiovascular exam normal    Pulmonary  Pulmonary exam normal     Other Findings        Anesthesia Plan  ASA Score- 3     Anesthesia Type- general with ASA Monitors  Additional Monitors:   Airway Plan:           Plan Factors-Exercise tolerance (METS): >4 METS  Chart reviewed  EKG reviewed  Existing labs reviewed  Patient summary reviewed  Patient is a current smoker  Patient instructed to abstain from smoking on day of procedure  Patient did not smoke on day of surgery  Induction- intravenous  Postoperative Plan-     Informed Consent- Anesthetic plan and risks discussed with patient  I personally reviewed this patient with the CRNA  Discussed and agreed on the Anesthesia Plan with the CRNA               Lab Results   Component Value Date    HGBA1C 5 6 12/02/2022       Lab Results   Component Value Date     06/22/2018    K 4 1 12/23/2022     12/23/2022    CO2 27 12/23/2022    ANIONGAP 14 4 06/22/2018    BUN 10 12/23/2022    CREATININE 0 68 12/23/2022    GLUCOSE 74 08/20/2015    GLUF 115 (H) 12/23/2022    CALCIUM 9 3 12/23/2022    AST 31 12/15/2022    ALT 30 12/15/2022    ALKPHOS 69 12/15/2022    PROT 6 8 06/22/2018    BILITOT 0 2 (L) 06/22/2018    EGFR 101 12/23/2022       Lab Results   Component Value Date    WBC 7 52 12/29/2022    HGB 13 2 12/29/2022    HCT 42 9 12/29/2022    MCV 90 12/29/2022     12/29/2022    Sinus tachycardia  Low voltage QRS  Cannot rule out Inferior infarct (cited on or before 15-DEC-2022)  Abnormal ECG  When compared with ECG of 15-DEC-2022 14:41, (unconfirmed)  No significant change was found  Confirmed by Ministerio Jensen ([id number]) on 12/15/2022 2:06:20 PM    PCP clearance reviewed

## 2023-01-11 ENCOUNTER — HOSPITAL ENCOUNTER (INPATIENT)
Facility: HOSPITAL | Age: 53
LOS: 19 days | Discharge: HOME | End: 2023-01-30
Attending: STUDENT IN AN ORGANIZED HEALTH CARE EDUCATION/TRAINING PROGRAM | Admitting: STUDENT IN AN ORGANIZED HEALTH CARE EDUCATION/TRAINING PROGRAM

## 2023-01-11 DIAGNOSIS — F25.0 SCHIZOAFFECTIVE DISORDER, BIPOLAR TYPE (HCC): Primary | Chronic | ICD-10-CM

## 2023-01-11 DIAGNOSIS — F41.1 GAD (GENERALIZED ANXIETY DISORDER): Chronic | ICD-10-CM

## 2023-01-11 DIAGNOSIS — G47.00 INSOMNIA, UNSPECIFIED TYPE: ICD-10-CM

## 2023-01-11 DIAGNOSIS — M62.838 MUSCLE SPASM: ICD-10-CM

## 2023-01-11 LAB
ATRIAL RATE: 97 BPM
FLUAV RNA RESP QL NAA+PROBE: NEGATIVE
FLUBV RNA RESP QL NAA+PROBE: NEGATIVE
P AXIS: 58 DEGREES
PR INTERVAL: 156 MS
QRS AXIS: -1 DEGREES
QRSD INTERVAL: 76 MS
QT INTERVAL: 362 MS
QTC INTERVAL: 459 MS
RSV RNA RESP QL NAA+PROBE: NEGATIVE
SARS-COV-2 RNA RESP QL NAA+PROBE: POSITIVE
T WAVE AXIS: 74 DEGREES
VENTRICULAR RATE: 97 BPM

## 2023-01-11 RX ORDER — HALOPERIDOL 5 MG/ML
5 INJECTION INTRAMUSCULAR
Status: CANCELLED | OUTPATIENT
Start: 2023-01-11

## 2023-01-11 RX ORDER — POLYETHYLENE GLYCOL 3350 17 G/17G
17 POWDER, FOR SOLUTION ORAL DAILY
Status: DISCONTINUED | OUTPATIENT
Start: 2023-01-11 | End: 2023-01-30 | Stop reason: HOSPADM

## 2023-01-11 RX ORDER — SORBITOL SOLUTION 70 %
30 SOLUTION, ORAL MISCELLANEOUS
Status: DISCONTINUED | OUTPATIENT
Start: 2023-01-11 | End: 2023-01-30 | Stop reason: HOSPADM

## 2023-01-11 RX ORDER — DIAPER,BRIEF,INFANT-TODD,DISP
EACH MISCELLANEOUS 4 TIMES DAILY PRN
Status: CANCELLED | OUTPATIENT
Start: 2023-01-11

## 2023-01-11 RX ORDER — PROPRANOLOL HYDROCHLORIDE 10 MG/1
10 TABLET ORAL EVERY 8 HOURS PRN
Status: CANCELLED | OUTPATIENT
Start: 2023-01-11

## 2023-01-11 RX ORDER — ACETAMINOPHEN 325 MG/1
650 TABLET ORAL EVERY 6 HOURS PRN
Status: DISCONTINUED | OUTPATIENT
Start: 2023-01-11 | End: 2023-01-30 | Stop reason: HOSPADM

## 2023-01-11 RX ORDER — NICOTINE 21 MG/24HR
1 PATCH, TRANSDERMAL 24 HOURS TRANSDERMAL DAILY
Status: DISCONTINUED | OUTPATIENT
Start: 2023-01-11 | End: 2023-01-30 | Stop reason: HOSPADM

## 2023-01-11 RX ORDER — LORAZEPAM 2 MG/ML
2 INJECTION INTRAMUSCULAR
Status: CANCELLED | OUTPATIENT
Start: 2023-01-11

## 2023-01-11 RX ORDER — ACETAMINOPHEN 325 MG/1
650 TABLET ORAL EVERY 6 HOURS PRN
Status: CANCELLED | OUTPATIENT
Start: 2023-01-11

## 2023-01-11 RX ORDER — CLOZAPINE 100 MG/1
100 TABLET ORAL DAILY
Status: DISCONTINUED | OUTPATIENT
Start: 2023-01-11 | End: 2023-01-30 | Stop reason: HOSPADM

## 2023-01-11 RX ORDER — DIAPER,BRIEF,INFANT-TODD,DISP
EACH MISCELLANEOUS 4 TIMES DAILY PRN
Status: DISCONTINUED | OUTPATIENT
Start: 2023-01-11 | End: 2023-01-30 | Stop reason: HOSPADM

## 2023-01-11 RX ORDER — CALCIUM CARBONATE 200(500)MG
500 TABLET,CHEWABLE ORAL DAILY PRN
Status: DISCONTINUED | OUTPATIENT
Start: 2023-01-11 | End: 2023-01-30 | Stop reason: HOSPADM

## 2023-01-11 RX ORDER — BENZTROPINE MESYLATE 1 MG/1
1 TABLET ORAL
Status: DISCONTINUED | OUTPATIENT
Start: 2023-01-11 | End: 2023-01-30 | Stop reason: HOSPADM

## 2023-01-11 RX ORDER — HYDROCORTISONE 25 MG/G
CREAM TOPICAL 4 TIMES DAILY PRN
Status: DISCONTINUED | OUTPATIENT
Start: 2023-01-11 | End: 2023-01-30 | Stop reason: HOSPADM

## 2023-01-11 RX ORDER — HALOPERIDOL 1 MG/1
2 TABLET ORAL DAILY PRN
Status: DISCONTINUED | OUTPATIENT
Start: 2023-01-11 | End: 2023-01-30 | Stop reason: HOSPADM

## 2023-01-11 RX ORDER — POLYETHYLENE GLYCOL 3350 17 G/17G
17 POWDER, FOR SOLUTION ORAL DAILY
Status: CANCELLED | OUTPATIENT
Start: 2023-01-11

## 2023-01-11 RX ORDER — HALOPERIDOL 1 MG/1
1 TABLET ORAL 2 TIMES DAILY PRN
Status: CANCELLED | OUTPATIENT
Start: 2023-01-11

## 2023-01-11 RX ORDER — GUAIFENESIN 600 MG/1
600 TABLET, EXTENDED RELEASE ORAL EVERY 12 HOURS SCHEDULED
Status: DISCONTINUED | OUTPATIENT
Start: 2023-01-11 | End: 2023-01-11 | Stop reason: HOSPADM

## 2023-01-11 RX ORDER — SUMATRIPTAN 50 MG/1
50 TABLET, FILM COATED ORAL DAILY PRN
Status: DISCONTINUED | OUTPATIENT
Start: 2023-01-11 | End: 2023-01-30 | Stop reason: HOSPADM

## 2023-01-11 RX ORDER — HYDROXYZINE 50 MG/1
50 TABLET, FILM COATED ORAL
Status: DISCONTINUED | OUTPATIENT
Start: 2023-01-11 | End: 2023-01-30 | Stop reason: HOSPADM

## 2023-01-11 RX ORDER — HALOPERIDOL 1 MG/1
1 TABLET ORAL 2 TIMES DAILY PRN
Status: DISCONTINUED | OUTPATIENT
Start: 2023-01-11 | End: 2023-01-30 | Stop reason: HOSPADM

## 2023-01-11 RX ORDER — BENZTROPINE MESYLATE 1 MG/ML
1 INJECTION INTRAMUSCULAR; INTRAVENOUS
Status: CANCELLED | OUTPATIENT
Start: 2023-01-11

## 2023-01-11 RX ORDER — FLUTICASONE PROPIONATE 50 MCG
1 SPRAY, SUSPENSION (ML) NASAL 2 TIMES DAILY
Status: CANCELLED | OUTPATIENT
Start: 2023-01-11

## 2023-01-11 RX ORDER — TRAZODONE HYDROCHLORIDE 50 MG/1
50 TABLET ORAL
Status: DISCONTINUED | OUTPATIENT
Start: 2023-01-11 | End: 2023-01-30 | Stop reason: HOSPADM

## 2023-01-11 RX ORDER — HYDROXYZINE 50 MG/1
100 TABLET, FILM COATED ORAL
Status: DISCONTINUED | OUTPATIENT
Start: 2023-01-11 | End: 2023-01-30 | Stop reason: HOSPADM

## 2023-01-11 RX ORDER — BENZONATATE 100 MG/1
100 CAPSULE ORAL 3 TIMES DAILY PRN
Status: DISCONTINUED | OUTPATIENT
Start: 2023-01-11 | End: 2023-01-11 | Stop reason: HOSPADM

## 2023-01-11 RX ORDER — BENZTROPINE MESYLATE 1 MG/ML
0.5 INJECTION INTRAMUSCULAR; INTRAVENOUS
Status: DISCONTINUED | OUTPATIENT
Start: 2023-01-11 | End: 2023-01-30 | Stop reason: HOSPADM

## 2023-01-11 RX ORDER — LORAZEPAM 2 MG/ML
2 INJECTION INTRAMUSCULAR EVERY 6 HOURS PRN
Status: CANCELLED | OUTPATIENT
Start: 2023-01-11

## 2023-01-11 RX ORDER — HALOPERIDOL 5 MG/ML
2.5 INJECTION INTRAMUSCULAR
Status: DISCONTINUED | OUTPATIENT
Start: 2023-01-11 | End: 2023-01-30 | Stop reason: HOSPADM

## 2023-01-11 RX ORDER — CLOZAPINE 200 MG/1
400 TABLET ORAL
Status: DISCONTINUED | OUTPATIENT
Start: 2023-01-11 | End: 2023-01-11

## 2023-01-11 RX ORDER — ONDANSETRON 4 MG/1
4 TABLET, ORALLY DISINTEGRATING ORAL EVERY 6 HOURS PRN
Status: DISCONTINUED | OUTPATIENT
Start: 2023-01-11 | End: 2023-01-30 | Stop reason: HOSPADM

## 2023-01-11 RX ORDER — BENZONATATE 100 MG/1
100 CAPSULE ORAL 3 TIMES DAILY PRN
Status: CANCELLED | OUTPATIENT
Start: 2023-01-11

## 2023-01-11 RX ORDER — DIPHENHYDRAMINE HYDROCHLORIDE 50 MG/ML
50 INJECTION INTRAMUSCULAR; INTRAVENOUS EVERY 6 HOURS PRN
Status: DISCONTINUED | OUTPATIENT
Start: 2023-01-11 | End: 2023-01-30 | Stop reason: HOSPADM

## 2023-01-11 RX ORDER — FENOFIBRATE 145 MG/1
145 TABLET, COATED ORAL DAILY
Status: DISCONTINUED | OUTPATIENT
Start: 2023-01-11 | End: 2023-01-30 | Stop reason: HOSPADM

## 2023-01-11 RX ORDER — HYDROXYZINE HYDROCHLORIDE 25 MG/1
25 TABLET, FILM COATED ORAL
Status: CANCELLED | OUTPATIENT
Start: 2023-01-11

## 2023-01-11 RX ORDER — HALOPERIDOL 5 MG/ML
5 INJECTION INTRAMUSCULAR
Status: DISCONTINUED | OUTPATIENT
Start: 2023-01-11 | End: 2023-01-30 | Stop reason: HOSPADM

## 2023-01-11 RX ORDER — ACETAMINOPHEN 325 MG/1
975 TABLET ORAL EVERY 6 HOURS PRN
Status: DISCONTINUED | OUTPATIENT
Start: 2023-01-11 | End: 2023-01-30 | Stop reason: HOSPADM

## 2023-01-11 RX ORDER — BENZONATATE 100 MG/1
100 CAPSULE ORAL 3 TIMES DAILY PRN
Status: DISCONTINUED | OUTPATIENT
Start: 2023-01-11 | End: 2023-01-30 | Stop reason: HOSPADM

## 2023-01-11 RX ORDER — HALOPERIDOL 1 MG/1
2 TABLET ORAL DAILY PRN
Status: CANCELLED | OUTPATIENT
Start: 2023-01-11

## 2023-01-11 RX ORDER — LORAZEPAM 2 MG/ML
1 INJECTION INTRAMUSCULAR
Status: CANCELLED | OUTPATIENT
Start: 2023-01-11

## 2023-01-11 RX ORDER — AMOXICILLIN 250 MG
1 CAPSULE ORAL DAILY PRN
Status: DISCONTINUED | OUTPATIENT
Start: 2023-01-11 | End: 2023-01-30 | Stop reason: HOSPADM

## 2023-01-11 RX ORDER — BENZTROPINE MESYLATE 1 MG/ML
0.5 INJECTION INTRAMUSCULAR; INTRAVENOUS
Status: CANCELLED | OUTPATIENT
Start: 2023-01-11

## 2023-01-11 RX ORDER — FLUTICASONE PROPIONATE 50 MCG
1 SPRAY, SUSPENSION (ML) NASAL 2 TIMES DAILY
Status: DISCONTINUED | OUTPATIENT
Start: 2023-01-11 | End: 2023-01-30 | Stop reason: HOSPADM

## 2023-01-11 RX ORDER — METHOCARBAMOL 500 MG/1
500 TABLET, FILM COATED ORAL EVERY 6 HOURS PRN
Status: CANCELLED | OUTPATIENT
Start: 2023-01-11

## 2023-01-11 RX ORDER — BENZTROPINE MESYLATE 1 MG/1
1 TABLET ORAL
Status: CANCELLED | OUTPATIENT
Start: 2023-01-11

## 2023-01-11 RX ORDER — ACETAMINOPHEN 325 MG/1
650 TABLET ORAL EVERY 4 HOURS PRN
Status: DISCONTINUED | OUTPATIENT
Start: 2023-01-11 | End: 2023-01-30 | Stop reason: HOSPADM

## 2023-01-11 RX ORDER — LORAZEPAM 2 MG/ML
2 INJECTION INTRAMUSCULAR EVERY 6 HOURS PRN
Status: DISCONTINUED | OUTPATIENT
Start: 2023-01-11 | End: 2023-01-30 | Stop reason: HOSPADM

## 2023-01-11 RX ORDER — ACETAMINOPHEN 325 MG/1
650 TABLET ORAL EVERY 4 HOURS PRN
Status: CANCELLED | OUTPATIENT
Start: 2023-01-11

## 2023-01-11 RX ORDER — HALOPERIDOL 0.5 MG/1
0.5 TABLET ORAL
Status: CANCELLED | OUTPATIENT
Start: 2023-01-11

## 2023-01-11 RX ORDER — CLOZAPINE 200 MG/1
400 TABLET ORAL
Status: CANCELLED | OUTPATIENT
Start: 2023-01-11

## 2023-01-11 RX ORDER — LORAZEPAM 2 MG/ML
2 INJECTION INTRAMUSCULAR
Status: DISCONTINUED | OUTPATIENT
Start: 2023-01-11 | End: 2023-01-30 | Stop reason: HOSPADM

## 2023-01-11 RX ORDER — HALOPERIDOL 5 MG/ML
2.5 INJECTION INTRAMUSCULAR
Status: CANCELLED | OUTPATIENT
Start: 2023-01-11

## 2023-01-11 RX ORDER — FENOFIBRATE 145 MG/1
145 TABLET, COATED ORAL DAILY
Status: CANCELLED | OUTPATIENT
Start: 2023-01-11

## 2023-01-11 RX ORDER — HYDROCORTISONE 25 MG/G
CREAM TOPICAL 4 TIMES DAILY PRN
Status: CANCELLED | OUTPATIENT
Start: 2023-01-11

## 2023-01-11 RX ORDER — MELATONIN
1000 DAILY
Status: DISCONTINUED | OUTPATIENT
Start: 2023-01-11 | End: 2023-01-30 | Stop reason: HOSPADM

## 2023-01-11 RX ORDER — SUMATRIPTAN 50 MG/1
50 TABLET, FILM COATED ORAL DAILY PRN
Status: CANCELLED | OUTPATIENT
Start: 2023-01-11

## 2023-01-11 RX ORDER — DOCUSATE SODIUM 100 MG/1
100 CAPSULE, LIQUID FILLED ORAL 2 TIMES DAILY
Status: DISCONTINUED | OUTPATIENT
Start: 2023-01-11 | End: 2023-01-30 | Stop reason: HOSPADM

## 2023-01-11 RX ORDER — MELATONIN
1000 DAILY
Status: CANCELLED | OUTPATIENT
Start: 2023-01-11

## 2023-01-11 RX ORDER — DOCUSATE SODIUM 100 MG/1
100 CAPSULE, LIQUID FILLED ORAL 2 TIMES DAILY
Status: CANCELLED | OUTPATIENT
Start: 2023-01-11

## 2023-01-11 RX ORDER — PANTOPRAZOLE SODIUM 40 MG/1
40 TABLET, DELAYED RELEASE ORAL
Status: CANCELLED | OUTPATIENT
Start: 2023-01-11

## 2023-01-11 RX ORDER — AMOXICILLIN 250 MG
1 CAPSULE ORAL DAILY PRN
Status: CANCELLED | OUTPATIENT
Start: 2023-01-11

## 2023-01-11 RX ORDER — LIDOCAINE 50 MG/G
1 PATCH TOPICAL DAILY
Status: CANCELLED | OUTPATIENT
Start: 2023-01-11

## 2023-01-11 RX ORDER — PANTOPRAZOLE SODIUM 40 MG/1
40 TABLET, DELAYED RELEASE ORAL
Status: DISCONTINUED | OUTPATIENT
Start: 2023-01-11 | End: 2023-01-30 | Stop reason: HOSPADM

## 2023-01-11 RX ORDER — GUAIFENESIN 600 MG/1
600 TABLET, EXTENDED RELEASE ORAL EVERY 12 HOURS SCHEDULED
Status: CANCELLED | OUTPATIENT
Start: 2023-01-11

## 2023-01-11 RX ORDER — ONDANSETRON 4 MG/1
4 TABLET, ORALLY DISINTEGRATING ORAL EVERY 6 HOURS PRN
Status: CANCELLED | OUTPATIENT
Start: 2023-01-11

## 2023-01-11 RX ORDER — SORBITOL SOLUTION 70 %
30 SOLUTION, ORAL MISCELLANEOUS
Status: CANCELLED | OUTPATIENT
Start: 2023-01-11

## 2023-01-11 RX ORDER — GUAIFENESIN 600 MG/1
600 TABLET, EXTENDED RELEASE ORAL EVERY 12 HOURS SCHEDULED
Status: DISCONTINUED | OUTPATIENT
Start: 2023-01-11 | End: 2023-01-30 | Stop reason: HOSPADM

## 2023-01-11 RX ORDER — BENZTROPINE MESYLATE 1 MG/ML
1 INJECTION INTRAMUSCULAR; INTRAVENOUS
Status: DISCONTINUED | OUTPATIENT
Start: 2023-01-11 | End: 2023-01-30 | Stop reason: HOSPADM

## 2023-01-11 RX ORDER — ACETAMINOPHEN 325 MG/1
975 TABLET ORAL EVERY 6 HOURS PRN
Status: CANCELLED | OUTPATIENT
Start: 2023-01-11

## 2023-01-11 RX ORDER — ALBUTEROL SULFATE 90 UG/1
2 AEROSOL, METERED RESPIRATORY (INHALATION) EVERY 6 HOURS PRN
Status: DISCONTINUED | OUTPATIENT
Start: 2023-01-11 | End: 2023-01-30 | Stop reason: HOSPADM

## 2023-01-11 RX ORDER — HALOPERIDOL 0.5 MG/1
0.5 TABLET ORAL
Status: DISCONTINUED | OUTPATIENT
Start: 2023-01-11 | End: 2023-01-30 | Stop reason: HOSPADM

## 2023-01-11 RX ORDER — CLOZAPINE 200 MG/1
200 TABLET ORAL
Status: DISCONTINUED | OUTPATIENT
Start: 2023-01-11 | End: 2023-01-12

## 2023-01-11 RX ORDER — HYDROXYZINE 50 MG/1
100 TABLET, FILM COATED ORAL
Status: CANCELLED | OUTPATIENT
Start: 2023-01-11

## 2023-01-11 RX ORDER — CLOZAPINE 100 MG/1
100 TABLET ORAL DAILY
Status: CANCELLED | OUTPATIENT
Start: 2023-01-11

## 2023-01-11 RX ORDER — DIPHENHYDRAMINE HYDROCHLORIDE 50 MG/ML
50 INJECTION INTRAMUSCULAR; INTRAVENOUS EVERY 6 HOURS PRN
Status: CANCELLED | OUTPATIENT
Start: 2023-01-11

## 2023-01-11 RX ORDER — HYDROXYZINE HYDROCHLORIDE 25 MG/1
25 TABLET, FILM COATED ORAL
Status: DISCONTINUED | OUTPATIENT
Start: 2023-01-11 | End: 2023-01-30 | Stop reason: HOSPADM

## 2023-01-11 RX ORDER — CALCIUM CARBONATE 200(500)MG
500 TABLET,CHEWABLE ORAL DAILY PRN
Status: CANCELLED | OUTPATIENT
Start: 2023-01-11

## 2023-01-11 RX ORDER — TRAZODONE HYDROCHLORIDE 50 MG/1
50 TABLET ORAL
Status: CANCELLED | OUTPATIENT
Start: 2023-01-11

## 2023-01-11 RX ORDER — NICOTINE 21 MG/24HR
1 PATCH, TRANSDERMAL 24 HOURS TRANSDERMAL DAILY
Status: CANCELLED | OUTPATIENT
Start: 2023-01-11

## 2023-01-11 RX ORDER — LIDOCAINE 50 MG/G
1 PATCH TOPICAL DAILY
Status: DISCONTINUED | OUTPATIENT
Start: 2023-01-11 | End: 2023-01-17

## 2023-01-11 RX ORDER — HYDROXYZINE 50 MG/1
50 TABLET, FILM COATED ORAL
Status: CANCELLED | OUTPATIENT
Start: 2023-01-11

## 2023-01-11 RX ORDER — LORAZEPAM 2 MG/ML
1 INJECTION INTRAMUSCULAR
Status: DISCONTINUED | OUTPATIENT
Start: 2023-01-11 | End: 2023-01-30 | Stop reason: HOSPADM

## 2023-01-11 RX ORDER — AMOXICILLIN 250 MG
2 CAPSULE ORAL
Status: CANCELLED | OUTPATIENT
Start: 2023-01-11

## 2023-01-11 RX ORDER — PROPRANOLOL HYDROCHLORIDE 10 MG/1
10 TABLET ORAL EVERY 8 HOURS PRN
Status: DISCONTINUED | OUTPATIENT
Start: 2023-01-11 | End: 2023-01-30 | Stop reason: HOSPADM

## 2023-01-11 RX ORDER — MAGNESIUM HYDROXIDE/ALUMINUM HYDROXICE/SIMETHICONE 120; 1200; 1200 MG/30ML; MG/30ML; MG/30ML
30 SUSPENSION ORAL EVERY 4 HOURS PRN
Status: CANCELLED | OUTPATIENT
Start: 2023-01-11

## 2023-01-11 RX ORDER — MAGNESIUM HYDROXIDE/ALUMINUM HYDROXICE/SIMETHICONE 120; 1200; 1200 MG/30ML; MG/30ML; MG/30ML
30 SUSPENSION ORAL EVERY 4 HOURS PRN
Status: DISCONTINUED | OUTPATIENT
Start: 2023-01-11 | End: 2023-01-30 | Stop reason: HOSPADM

## 2023-01-11 RX ORDER — METHOCARBAMOL 500 MG/1
500 TABLET, FILM COATED ORAL EVERY 6 HOURS PRN
Status: DISCONTINUED | OUTPATIENT
Start: 2023-01-11 | End: 2023-01-30 | Stop reason: HOSPADM

## 2023-01-11 RX ORDER — AMOXICILLIN 250 MG
2 CAPSULE ORAL
Status: DISCONTINUED | OUTPATIENT
Start: 2023-01-11 | End: 2023-01-30 | Stop reason: HOSPADM

## 2023-01-11 RX ORDER — ALBUTEROL SULFATE 90 UG/1
2 AEROSOL, METERED RESPIRATORY (INHALATION) EVERY 6 HOURS PRN
Status: CANCELLED | OUTPATIENT
Start: 2023-01-11

## 2023-01-11 RX ADMIN — POLYETHYLENE GLYCOL 3350 17 G: 17 POWDER, FOR SOLUTION ORAL at 08:24

## 2023-01-11 RX ADMIN — VENLAFAXINE HYDROCHLORIDE 225 MG: 37.5 CAPSULE, EXTENDED RELEASE ORAL at 08:20

## 2023-01-11 RX ADMIN — DOCUSATE SODIUM 100 MG: 100 CAPSULE, LIQUID FILLED ORAL at 08:21

## 2023-01-11 RX ADMIN — FENOFIBRATE 145 MG: 145 TABLET, COATED ORAL at 08:20

## 2023-01-11 RX ADMIN — NICOTINE 1 PATCH: 21 PATCH, EXTENDED RELEASE TRANSDERMAL at 08:24

## 2023-01-11 RX ADMIN — HYDROXYZINE HYDROCHLORIDE 50 MG: 50 TABLET, FILM COATED ORAL at 17:34

## 2023-01-11 RX ADMIN — CLOZAPINE 200 MG: 200 TABLET ORAL at 21:35

## 2023-01-11 RX ADMIN — HALOPERIDOL 12.5 MG: 1 TABLET ORAL at 17:04

## 2023-01-11 RX ADMIN — CLOZAPINE 100 MG: 100 TABLET ORAL at 08:21

## 2023-01-11 RX ADMIN — SENNOSIDES AND DOCUSATE SODIUM 2 TABLET: 8.6; 5 TABLET ORAL at 21:37

## 2023-01-11 RX ADMIN — GUAIFENESIN 600 MG: 600 TABLET, EXTENDED RELEASE ORAL at 21:35

## 2023-01-11 RX ADMIN — DOCUSATE SODIUM 100 MG: 100 CAPSULE, LIQUID FILLED ORAL at 17:04

## 2023-01-11 RX ADMIN — PANTOPRAZOLE SODIUM 40 MG: 40 TABLET, DELAYED RELEASE ORAL at 06:20

## 2023-01-11 RX ADMIN — GUAIFENESIN 600 MG: 600 TABLET, EXTENDED RELEASE ORAL at 08:21

## 2023-01-11 RX ADMIN — CHOLECALCIFEROL TAB 25 MCG (1000 UNIT) 1000 UNITS: 25 TAB at 08:20

## 2023-01-11 RX ADMIN — HALOPERIDOL 12.5 MG: 1 TABLET ORAL at 08:20
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
## 2023-01-11 NOTE — CASE MANAGEMENT
Call made to [facility] tel# [phone], spoke with Remi Harris; informed her of pt's Covid + result and transfer to unit  Provided CM's contact info  Call made to [facility], tel# [phone], april requesting c/b
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
## 2023-01-11 NOTE — CASE MANAGEMENT
Cm met with pt, reviewed role of CM  Pt transferred from 3P unit due to testing Covid +  Pt reports not wanting to complete ECT treatments due to noticing effect in memory  Pt signed DEYA for sister [facility]onda [facility] group home and [facility]  Pt denies any changes from initial intake completed on 12/16/22 by OLIVE Black

## 2023-01-11 NOTE — NURSING NOTE
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 newly scheduled haldol at 1132, robaxin for back spasm, tylenol 975mg for back and head pain, sumatriptan for migraine at 1133  Interventions effective  Pt no longer reporting headache or pain 
Pt denies SI, HI and VH  Pt continues to report AH  Pt is calm and cooperative but has an irritable edge  Pt is visible in the milieu but remains isolative to self  Medication and meal complaint 
Pt denies SI, HI and VH  Pt continues to report AH "I just don't get it as to why I'm having such bad voices something is just not right " Pt informed to discuss her concerns with the psychiatrist and treatment team tomorrow  Pt reporting agitation due to voices and was given PRN Zyprexa 10mg  Pt is visible in the milieu but remains isolative to self  Medication and meal compliant 
Pt denies SI, HI and VH  Pt reporting AH and feeling "like I'm in a haze today"  Pt requesting a PRN for the voices and agitation and received PRN Zyprexa 5mg @ 0925  Pt is visible in the milieu but remains isolative to self  Pt is calm and cooperative  Medication and meal compliant 
Pt denies SI, HI and VH  Pt still continues to report AH of "negative voices"  Pt feeling agitated and anxious due to voices, HAM scale score 22  Pt given PRN atarax 50mg and Zyprexa 5mg which was effective  Pt mainly isolative to room with brief intervals in the milieu for meals and needs  Pt with no current complaints or concerns  Medication and meal complaint 
Pt denies SI, HI and VH  Pt still reporting AH "of negative voices"  Pt reports she "always has voices I don't think I would survive without them  They say mean things to me but they also help me do normal things in life  I normally will journal, or go for a smoke instead of using a PRN like I do here but I do have the option at my home " Pt atarax 50 @ 1606 for HAM scale score 20 along with PRN Zyprexa 10mg due to the voices, pt reporting PRN's were effective  Pt given PRN robaxin for muscle spasms  Pt is mainly isolative to room with brief intervals in the milieu for meals and needs  Medication and meal compliant 
Pt denies SI, HI, AH and VH to this writer  Pt is visible in the milieu but remains isolative to self  Pt given PRN robaxin and Imitrex which were effective  Pt has a slight irritable edge  Pt has no current complaints or concerns  Medication and meal compliant 
Pt denies SI, HI, and VH  Pt continues to report AH  PRN Zyprexa was "slightly effective" for agitation  Pt is isolative to room with brief intervals in the milieu for meals and needs  Medication and meal compliant 
Pt denies SI, HI, and VH  Pt reporting AH "of really negative words just darkening my world " Pt reporting feeling agitated due to voices and received PRN Zyprexa 10mg  Pt is isolative to room with brief intervals in the milieu for meals and needs  Medication and meal compliant 
Pt denies SI, HI, and VH but still continues to report AH  Pt is mainly isolative to her room with brief intervals in the milieu for meals and needs  Pt reporting feeling agitated due to voices and "not being able to get in touch with my boyfriend I am worried something it wrong with him " Pt given PRN Zyprexa 10mg @ 1507 which was effective  Pt also given PRN albuterol inhaler  Medication and meal compliant 
Pt denies SI, and VH  Pt reports "i'm still having the voices and I still have homicidal thoughts " pt able to contract for safety  Pt requested a PRN for agitation due to the voices and was given PRN zyprexa which was slightly effective  Pt also given PRN milk of mag, hydrocortisone cream and hydrocortisone (anusol) cream  Medication and meal compliant 
Pt denies SI/HI/AH  Pt reports visual hallucinations  Pt states she cant make out what they are but that they are just there  Pt doesn't feel medication is effective and that she expected a better outcome due to being on a high amount of Clozaril  Medication and meal compliant  Present in dayroom and milieu  Social with select peers  2476 Pt observed sleeping in room when going to assess Pt for PRN effectiveness  Will continue to monitor 
Pt denies SI/HI/AH/VH  Present in dayroom and milieu  Medication and meal compliant  Isolative to self  Scant/guarded with communication  Pt  focused don room being cold  Pt has several sweaters and robe to wear but is observed wearing a fely shirt  Work request submitted by staff to have maintenance check heat source in room  Pt offered extra blanket  Will continue to monitor 
Pt denies any depression or anxiety, SI, HI and A/V hallucinations  Compliant with scheduled medications  Wearing personal clothing out of pt preference  Out of room for meals and unit activities  Denies any other unmet needs at this time 
Pt denies psych symptoms  Pt having complaints of heartburn and received PRN Mylanta  Pt is isolative to room with brief intervals in the milieu for meals and needs  Medication and meal complaint 
Pt given Imitrex 50mg po prn for 8/10 migraine headache  Pt also statedd "are they doing experiments here?"  Upon request for elaboration, pt reported she was "feeling funny"  Pt appeared to respond well to reassurance 
Pt had ECT this morning, vital signs stable upon return  No complaints of headache or pain  Reports memory loss and mild confusion  Endorses mild depression, affect is depressed  Denies anxiety, SI/HI, or AVH  Compliant with scheduled meds  Ate breakfast in dining room  No requests at this time 
Pt has been in bed since 7PM and only got out of bed for a snack  She stated that she has not had a BM for a couple of days and requested MOM  This writer explained that she was getting Senokot at [facility] and still insisted on getting the MOM  Also requested and received the PRN Robaxin  Lidocaine patch removed as ordered  Denies SI, and HI  Stated she is still hearing voices and seeing things but would not elaborate 
Pt has been resting in bedroom most of evening  Pt was difficult to arouse, lethargic, and incontinent of urine  Pt did respond to staff members after multiple attempts  Vital signs WDL  Pt was oriented to self and place, but stated "I thought it was Tuesday"  Pt is ambulating appropriately without any issues  Pt is able to complete ADL's without any issues 
Pt is calm and cooperative upon approach  Pt has been isolative most of evening resting in bedroom  Denies SI, HI, AH, VH  Pt stated to writer "I've just been very forgetful and depressed lately  Pharmacy contacted in regards to clozapine level being 874 12/23 and increased evening dosage  Pharmacy stated they have been monitoring levels, and the dose is appropriate to administer  Compliant with evening medication  No unmet needs verbalized by pt 
Pt is calm, cooperative, and pleasant upon approach  Isolative to room most of evening resting in bedroom  Denies SI, HI, AH, VH  Pt was not heard RIS by staff this afternoon  Compliant with evening medication  No unmet needs verbalized by pt at this time 
Pt is calm, cooperative, and pleasant upon approach  Pt is isolative to room most of evening, visible for needs at times  Denies SI, HI, AH, VH  Pt was heard yelling in room/ RIS  Compliant with evening medication  No unmet needs verbalized at this time 
Pt is calm, cooperative, and pleasant upon approach  Social and visible in the milieu with select peers  Denies SI, HI, VH  When asked about AH, pt stated "not really"  Compliant with evening medications and snacks  No unmet needs verbalized at this time 
Pt is calm, cooperative, and pleasant upon approach  Visible in the milieu using the pt phone, social with peers at times  Denies SI, HI, VH, but reports AH  Pt stated "I don't know what they're saying, I just ignore them"  Pt is able to contract for safety  Compliant with medications, requested and given PRN topical and rectal hydrocortisone creams  PRN atarax and robaxin effective  No other requests/ needs verbalized at this time 
Pt is currently snoring so Robaxin was effective  She denied SI, HI, AH, and VH  Went to bed at 2000  Aware of ECT in the AM and NPO status tonight  The patient told this writer "that a year a go a man made her eat a mouse and she was on antibiotics  No other delusional comments noted 
Pt is positive for COVID and will be discharged to 6T  Pt is aware of discharge 
Pt is snoring loudly at the current time so Fioricet  Effective  Pt went to bed early and requested her HS medications as soon as she could get them  Pt has been in her room since 7 PM   States that the hallucinations are decreasing  Denies SI, and HI 
Pt presents anxious, cooperative, restless at times, polite and guarded upon approach, visible in the milieu and social with select peers  Pt appears extra phone focused today, trying to call friends multiple times with no success  Around 1012, pt approached writer after meeting with the doctor  Pt c/o "I'm having hallucinations "  Writer asked pt to describe hallucinations, whether the hallucinations were auditory and or visual in nature  Pt became defensive, "They are just hallucinations, give me something for them "  Eventually, pt described the hallucinations, "voices constantly talking in my head, and seeing auras around objects/people "  Pt informed scheduled Haldol 5 mg PO @ 0805  Pt also stated during AM med pass, "I just want to get out of here "  Pt encouraged utilizing coping skills to help alleviate voices  Pt receptive and decided to use headphones  Pt compliant with medication and meals  Pt denies SI/HI  Will continue to monitor 
Pt presents delayed, preoccupied, flat and disorganized upon approach, and isolative to bedroom  Pt c/o AH, but unable to describe the voices  Pt denies SI/HI/VH  Pt encouraged to attend groups and participate in treatment  Pt acknowledged suggestion, but declined and continued resting in bedroom  Pt compliant with medication (refused nicotine and lidocaine patch) and meals  Early in the morning, pt was heard coughing in bedroom  Medical made aware  Will continue to monitor 
Pt reassessed for agitation and hallucinations  Pt currently denies hallucinations and appears calmer/in behavioral control  Pt also reassessed for 9/10 HA after PRN intervention  Pt currently denies any pain  Will continue to monitor 
Pt received from ECT at 0700  Pt stated to writer "I'm confused, I don't know where I am"  Pt was able to state name and   Pt stated "I know were on the third floor but I don't know what hospital or time it is"  Vitals stable  Pt requested ice water  Denies any pain 
Pt reported atarax "helped a little bit"  Pt given PRN milk of magnesia for constipation stating "I take senna at night and I need milk of mag " Pt also given PRN imitrex for 8/10 migraine 
Pt reported to nurses station stating "I'm having really bad anxiety and the muscles in my legs hurt"  Ham score 25  Pt given PRN atarax 100mg for severe anxiety and PRN robaxin 500mg for muscle spasms 
Pt reporting AH but denies SI, HI and VH  Pt with mild confusion and needs frequent reorientation to time of day  Pt is visible in the milieu but remains isolative to self  Pt encouraged to continue ECT treatment  Medication and meal compliant 
Pt reporting agitation due to hearing AH  Pt stated "The voices are really bad today I have never had them like this I think they need to change my medications " Pt given PRN Zyprexa 10mg 
Pt reports Zyprexa "its only slightly effective I just feel some what calmer" 
Pt requested zyprexa prn for moderate agitation; verbalized "people stigmatizing mental health issues " Pt appeared to believe some of these were audio hallucinations  RN informed pt voices are likely hallucinations 
Pt returned from ECT, pt currently denies any nausea, vomiting, or headache  Pt does report feeling mildly confused stating "I don't know how the ECT went but I feel fine" 
Pt spent most of evening resting in bedroom  As per previous nightshift, pt remained lethargic, sedated, diaphoretic, and hard to arouse  Vitals and BS were taken, both WDL  Pt evening clozapine dose was scheduled to increase from 300mg to 400mg  Writer contacted [employer] in regards to pt condition  Evening clozapine held  Pt was encouraged to ambulate to bathroom and back to bed  Pt denies SI, HI, AH, VH, but stated "my auditory hallucinations today were so bad  It was scary"  Pt denies any unmet needs and is resting in bedroom 
Pt stated "are you guys giving me fake meds, I don't understand why I am hallucinating this bad it feels like I'm getting no meds even though I am taking PRN's " Pt informed she is not receiving fake medications and was encouraged to try to go to groups to help with the voices 
Pt states headache 2/10 after receiving Imitrex 50 mg PO  PRN intervention moderately effective  Will continue to monitor 
Pt states she has not had a  Bowel movement yet  Pt states she does not remember the last time she had a bowel movement  Pt states Zyprexa was only slightly effective  Will continue to monitor 
Pt was able to go back to sleep after receiving the Trazodone and Atarax 
Pt was reassessed for muscle spasms and pain  Pt states 0/10 pain and decrease in muscle spasms  Pt currently pacing the unit with steady gait  PRN intervention effective 
Received Patient at 1900  Patient visible on unit  Patient reported feeling anxious about her ECT tomorrow  Patient received her schedule medication  Patient reeducated in ECT preparation  Pt verbalize understanding  Pt remain Q 7 min check  
Received patient at 1900  Patient lying in bed denies any umet needs  Patient compliant with schedule medication   Patient remain Q 7 min check
The pt was calm and cooperative this am, she said she was depressed 5/10 due to being in the hospital and 'the voices"  The pt denied hearing the voices early this am  The pt came to this writer at 65 c/o of hearing voices and asked for Zyprexa, when asked what they were saying to her the pt stated " I'm not sure, you'll are doing this making the voices talk to  me, the voices doesn't want  me to be  happy"  This writer had a conversation with the pt, the was given Zyprexa 10mg at 65 which was effective, no further complaints noted  The pt participated in groups she denied SI/HI and agreed to a verbal contract to safety which was reviewed with her 
The pt was in her rm reading , she came out and appeared visibly upset, and sounded very upset as well  The pt stated "I don't like when they touch my hair and put the brush through my hair, I told them not to touch my hair they keep doing it" , the pt informed this writer she was in her rm reading when "they" started doing it  The pt also started c/o stalkers and stating "they better leave me alone"  The pt was given Zyprexa and Atarax which she said was effective 
The pt was resting in her rm upon initial contact, she denied pain, anxiety/depression, SI/HI, A/V hallucinations  The pt approached this writer at approx 1410 and c/o of the voices bothering her and visual hallucinations  When this writer asked the pt what was she seeing she stated "it's just like a movie,I don't know"  The pt requested Haldol, she stated "it works"  The pt was given Haldol 5 mg it was effective 
This morning when the pt was assessed she was calm and cooperative she had a bright affect  The pt denied pain,she stated her anxiety  2/4, denied depression, stated "I'm hopeful for ECT"  The pt informed this writer she had not experienced any A/V hallucinations this am   The pt denied SI/HI and agreed to a verbal contract to safety  When the pt was asked if she was having any thoughts of anyone trying to hurt her she stated to this writer "I'm being stalked in my town, people are pretending to want to help me, the girl said she was going to cut me up in pieces"  When asked what girl she was referring to the pt stated "the one at my group home  At approx 1040 this writer observed the pt pacing with a worried look on her face she was asked if she was ok she responded by stating "yes" and continued to pace  The pt approached this writer less than 5 mins later and requested Zyprexa, when asked why she wanted it the pt stated " it's none of your business I'm tired of answering the same questions every day " This writer informed the pt I need to know the reason for her request for proper documentation  The pt was receptive to the information given and she was apologetic  The pt stated " I was hearing a lot of voices while in my rm that just won't stop"  The pt reassured this writer that she could contract to safety  No further issues noted at this time 
Trazadone 50 mg was effective  Patient in bed at this time and appears to be sleeping  Eyes closed and chest movements noted 
Trazadone 50 mg was effective  Patient in bed at this time and appears to be sleeping  Eyes closed and chest movements noted 
While standing in the hallway patient approached staff and peers asking for her purse  This nurse explained to her that her purse was locked in storage  Patient looked somewhat surprised before returning back to her room  Compliant with scheduled meds  Denies all but stated "I'm tired my boyfriend's devouring everything and my mom's tired of cleaning after him  All he does in   [employer] drugs, cheat with a different female everyday and don't even care anymore about hiding them anymore!"  Nursing encouragement given, verbalized understanding  No other c/o pain, discomfort, outburst reported  Will monitor 
Zyprexa 10 mg given an hour ago, was effective  No further complaints of any haluciantions 
therapy frequency/functional limitations in following categories/anticipated discharge recommendation/risk reduction/prevention

## 2023-01-11 NOTE — BH TRANSITION RECORD
Contact Information: If you have any questions, concerns, pended studies, tests and/or procedures, or emergencies regarding your inpatient behavioral health visit  Please contact [address] [place] [address] behavioral health unit 3P [phone] and ask to speak to a , nurse or physician  A contact is available 24 hours/ 7 days a week at this number  Summary of Procedures Performed During your Stay:  Below is a list of major procedures performed during your hospital stay and a summary of results:  - Operative/Interventional procedures: ECT, 7/12 sessions most recent on 1/6/23   - Cardiac Procedures/Studies: EKG most recent  1/11/23 NSR     Pending Studies (From admission, onward)     Start     Ordered    01/10/23 2100  Clozapine  Once        Comments: Draw prior to administration of meds    01/10/23 1125              Please follow up on the above pending studies with your PCP and/or referring provider

## 2023-01-11 NOTE — TREATMENT PLAN
TREATMENT PLAN REVIEW - [facility] [place] 47 y o  1970 female MRN: [id number]    [address] Room / Bed: Michelle Alvarez/[facility] 728-62 Encounter: [id number]          Admit Date/Time:  1/11/2023  1:45 AM    Treatment Team: Attending Provider: Ashley Monsalve MD; Patient Care Assistant: Nancy Beltran;  Patient Care Assistant: Mattie Calderon; Patient Care Assistant: Valdemar Woodson; Licensed Practical Nurse: Charlie Avendano LPN; Nurse Manager: Martine Carlson RN; Occupational Therapy Assistant: Ruby Pierce; : HEIKE Florez; Patient Care Assistant: Han Morris    Diagnosis: Active Problems:    Schizoaffective disorder, bipolar type [facility])      Patient Strengths/Assets: cooperative, communication skills, compliant with medication, patient is on a voluntary commitment    Patient Barriers/Limitations: difficulty adapting, poor insight, poor past treatment response, poor self-care, self-care deficit    Short Term Goals: decrease in depressive symptoms, decrease in anxiety symptoms, decrease in psychotic symptoms, decrease in suicidal thoughts, ability to stay safe on the unit, improvement in reasoning ability, improvement in self care, sleep improvement, improvement in appetite, mood stabilization, increase in group attendance, increase in socialization with peers on the unit, acceptance of need for psychiatric treatment    Long Term Goals: improvement in depression, stabilization of mood, free of suicidal thoughts, resolution of psychotic symptoms, improvement in reality testing, improvement in reasoning ability, able to express basic needs, acceptance of need for psychiatric medications, acceptance of need for psychiatric treatment, acceptance of need for psychiatric follow up after discharge, acceptance of psychiatric diagnosis, adequate self care, adequate sleep, adequate appetite, adequate oral intake, appropriate interaction with peers, appropriate interaction with family    Progress Towards Goals: continue psychiatric medications as prescribed, improving    Recommended Treatment: medication management, patient medication education, group therapy, milieu therapy, continued Behavioral Health psychiatric evaluation/assessment process    Treatment Frequency: daily medication monitoring, group and milieu therapy daily, monitoring through interdisciplinary rounds, monitoring through weekly patient care conferences    Expected Discharge Date:  10days    Discharge Plan: referrals as indicated, will return to  after 10 days to continue ECT    Treatment Plan Created/Updated By: Fabian Alvarado MD

## 2023-01-11 NOTE — PLAN OF CARE
Problem: Alteration in Thoughts and Perception  Goal: Treatment Goal: Gain control of psychotic behaviors/thinking, reduce/eliminate presenting symptoms and demonstrate improved reality functioning upon discharge  Outcome: Progressing  Goal: Verbalize thoughts and feelings  Description: Interventions:  - Promote a nonjudgmental and trusting relationship with the patient through active listening and therapeutic communication  - Assess patient's level of functioning, behavior and potential for risk  - Engage patient in 1 on 1 interactions  - Encourage patient to express fears, feelings, frustrations, and discuss symptoms    - [place] patient to reality, help patient recognize reality-based thinking   - Administer medications as ordered and assess for potential side effects  - Provide the patient education related to the signs and symptoms of the illness and desired effects of prescribed medications  Outcome: Progressing  Goal: Refrain from acting on delusional thinking/internal stimuli  Description: Interventions:  - Monitor patient closely, per order   - Utilize least restrictive measures   - Set reasonable limits, give positive feedback for acceptable   - Administer medications as ordered and monitor of potential side effects  Outcome: Progressing  Goal: Agree to be compliant with medication regime, as prescribed and report medication side effects  Description: Interventions:  - Offer appropriate PRN medication and supervise ingestion; conduct AIMS, as needed   Outcome: Progressing  Goal: Recognize dysfunctional thoughts, communicate reality-based thoughts at the time of discharge  Description: Interventions:  - Provide medication and psycho-education to assist patient in compliance and developing insight into his/her illness   Outcome: Progressing  Goal: Complete daily ADLs, including personal hygiene independently, as able  Description: Interventions:  - Observe, teach, and assist patient with ADLS  - Monitor and promote a balance of rest/activity, with adequate nutrition and elimination   Outcome: Progressing     Problem: Ineffective Coping  Goal: Identifies ineffective coping skills  Outcome: Progressing  Goal: Identifies healthy coping skills  Outcome: Progressing  Goal: Demonstrates healthy coping skills  Outcome: Progressing  Goal: Patient/Family participate in treatment and DC plans  Description: Interventions:  - Provide therapeutic environment  Outcome: Progressing  Goal: Patient/Family verbalizes awareness of resources  Outcome: Progressing  Goal: Understands least restrictive measures  Description: Interventions:  - Utilize least restrictive behavior  Outcome: Progressing  Goal: Free from restraint events  Description: - Utilize least restrictive measures   - Provide behavioral interventions   - Redirect inappropriate behaviors   Outcome: Progressing     Problem: Risk for Self Injury/Neglect  Goal: Treatment Goal: Remain safe during length of stay, learn and adopt new coping skills, and be free of self-injurious ideation, impulses and acts at the time of discharge  Outcome: Progressing  Goal: Refrain from harming self  Description: Interventions:  - Monitor patient closely, per order  - Develop a trusting relationship  - Supervise medication ingestion, monitor effects and side effects   Outcome: Progressing  Goal: Recognize maladaptive responses and adopt new coping mechanisms  Outcome: Progressing     Problem: Depression  Goal: Treatment Goal: Demonstrate behavioral control of depressive symptoms, verbalize feelings of improved mood/affect, and adopt new coping skills prior to discharge  Outcome: Progressing  Goal: Refrain from isolation  Description: Interventions:  - Develop a trusting relationship   - Encourage socialization   Outcome: Progressing  Goal: Refrain from self-neglect  Outcome: Progressing     Problem: Anxiety  Goal: Anxiety is at manageable level  Description: Interventions:  - Assess and monitor patient's anxiety level  - Monitor for signs and symptoms (heart palpitations, chest pain, shortness of breath, headaches, nausea, feeling jumpy, restlessness, irritable, apprehensive)  - Collaborate with interdisciplinary team and initiate plan and interventions as ordered    - [place] patient to unit/surroundings  - Explain treatment plan  - Encourage participation in care  - Encourage verbalization of concerns/fears  - Identify coping mechanisms  - Assist in developing anxiety-reducing skills  - Administer/offer alternative therapies  - Limit or eliminate stimulants  Outcome: Progressing     Problem: Risk for Violence/Aggression Toward Others  Goal: Treatment Goal: Refrain from acts of violence/aggression during length of stay, and demonstrate improved impulse control at the time of discharge  Outcome: Progressing  Goal: Refrain from destructive acts on the environment or property  Outcome: Progressing  Goal: Control angry outbursts  Description: Interventions:  - Monitor patient closely, per order  - Ensure early verbal de-escalation  - Monitor prn medication needs  - Set reasonable/therapeutic limits, outline behavioral expectations, and consequences   - Provide a non-threatening milieu, utilizing the least restrictive interventions   Outcome: Progressing  Goal: Identify appropriate positive anger management techniques  Description: Interventions:  - Offer anger management and coping skills groups   - Staff will provide positive feedback for appropriate anger control  Outcome: Progressing
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
## 2023-01-11 NOTE — NURSING NOTE
Patient isolative and withdrawn to room, calm, and cooperative with assessment questions  Patient os medication and meals compliant  Patient currently denies SI, AH, VH  Patient reports no anxiety and no depression  Patient in bed at the moment, will continue to monitor

## 2023-01-12 LAB
ATRIAL RATE: 102 BPM
P AXIS: 65 DEGREES
PR INTERVAL: 150 MS
QRS AXIS: 9 DEGREES
QRSD INTERVAL: 76 MS
QT INTERVAL: 368 MS
QTC INTERVAL: 479 MS
T WAVE AXIS: 77 DEGREES
VENTRICULAR RATE: 102 BPM

## 2023-01-12 RX ADMIN — FLUTICASONE PROPIONATE 1 SPRAY: 50 SPRAY, METERED NASAL at 17:15

## 2023-01-12 RX ADMIN — CHOLECALCIFEROL TAB 25 MCG (1000 UNIT) 1000 UNITS: 25 TAB at 08:32

## 2023-01-12 RX ADMIN — CLOZAPINE 100 MG: 100 TABLET ORAL at 08:32

## 2023-01-12 RX ADMIN — HALOPERIDOL 12.5 MG: 1 TABLET ORAL at 17:14

## 2023-01-12 RX ADMIN — HALOPERIDOL 0.5 MG: 0.5 TABLET ORAL at 13:25

## 2023-01-12 RX ADMIN — FENOFIBRATE 145 MG: 145 TABLET, COATED ORAL at 08:32

## 2023-01-12 RX ADMIN — POLYETHYLENE GLYCOL 3350 17 G: 17 POWDER, FOR SOLUTION ORAL at 08:33

## 2023-01-12 RX ADMIN — PHENOL 1 SPRAY: 1.5 LIQUID ORAL at 11:20

## 2023-01-12 RX ADMIN — NICOTINE 1 PATCH: 21 PATCH, EXTENDED RELEASE TRANSDERMAL at 08:33

## 2023-01-12 RX ADMIN — VENLAFAXINE HYDROCHLORIDE 225 MG: 37.5 CAPSULE, EXTENDED RELEASE ORAL at 08:35

## 2023-01-12 RX ADMIN — GUAIFENESIN 600 MG: 600 TABLET, EXTENDED RELEASE ORAL at 08:32

## 2023-01-12 RX ADMIN — FLUTICASONE PROPIONATE 1 SPRAY: 50 SPRAY, METERED NASAL at 08:34

## 2023-01-12 RX ADMIN — HALOPERIDOL 12.5 MG: 1 TABLET ORAL at 08:34

## 2023-01-12 RX ADMIN — GUAIFENESIN 600 MG: 600 TABLET, EXTENDED RELEASE ORAL at 21:03

## 2023-01-12 RX ADMIN — CLOZAPINE 250 MG: 25 TABLET ORAL at 21:03

## 2023-01-12 RX ADMIN — DOCUSATE SODIUM 100 MG: 100 CAPSULE, LIQUID FILLED ORAL at 17:14

## 2023-01-12 RX ADMIN — SENNOSIDES AND DOCUSATE SODIUM 2 TABLET: 8.6; 5 TABLET ORAL at 20:55

## 2023-01-12 RX ADMIN — PANTOPRAZOLE SODIUM 40 MG: 40 TABLET, DELAYED RELEASE ORAL at 06:23

## 2023-01-12 RX ADMIN — DOCUSATE SODIUM 100 MG: 100 CAPSULE, LIQUID FILLED ORAL at 08:33

## 2023-01-12 RX ADMIN — ACETAMINOPHEN 975 MG: 325 TABLET ORAL at 10:51

## 2023-01-12 NOTE — NURSING NOTE
Pt is calm and cooperative with care, social with roommate and staff  Pt reports 2/4 anxiety, Atarax 50 mg given at 1734, was effective  Pt denies all other psych s/s  Medication complaint, will continue to maintain q7 min checks

## 2023-01-12 NOTE — NURSING NOTE
Nurse observed patient responding to internal stimuli while passing med this AM  Patient reports VH, and AH that are not command in nature  Patient requested and received PRN haldol given 1325 for agitation, and PRN tylenol given for headache rated 7/10  Patient denies SI, and anxiety, rated depression 5/10  Patient is medication and meals compliant  No further distress noted, will continue to monitor

## 2023-01-12 NOTE — PROGRESS NOTES
Progress Note - Behavioral Health   Joe Richter 47 y o  female MRN: [id number]  Unit/Bed#[facility]ile[facility] 479-02 Encounter: [id number]    The patient was seen for continuing care and reviewed with treatment team  Patient seen today she reports feeling scared because her AH have reoccurred  Per RN, she could be heard responding to internal stimulus briefly  and reporting feeling anxious  She reports persistent VH but these are not distressing This morning she is sad and constricted in affect, reports Covid symptoms are better today and the plan  to go back up on her HS dose  Last Bowel Movement was yesterday, pt denies hypersalivation, No fever, no chestpain, no tachycardia today  BP 92/66 (BP Location: Right arm)   Pulse 95   Temp (!) 97 4 °F (36 3 °C) (Temporal)   Resp 18   LMP  (LMP Unknown)   SpO2 94%       Current Mental Status Evaluation:  Appearance:   fair hygiene and grooming    Behavior:  calm and cooperative   Mood:  ' sad about being here"   Affect: constricted   Speech: Normal volume and Normal rate   Thought Process:  Goal directed and coherent   Thought Content:  Paranoid and mistrustful   Perceptual Disturbances:  Auditory hallucinations without commands and Visual hallucinations   Risk Potential: No suicidal or homicidal ideation   Orientation:   Patient is alert, awake and oriented x 3   Patient has limited insight, Judgement and impulse control  Recent Results (from the past 72 hour(s))   CBC and differential    Collection Time: 01/10/23  8:12 PM   Result Value Ref Range    WBC 7 65 4 31 - 10 16 Thousand/uL    RBC 4 14 3 81 - 5 12 Million/uL    Hemoglobin 11 7 11 5 - 15 4 g/dL    Hematocrit 36 7 34 8 - 46 1 %    MCV 89 82 - 98 fL    MCH 28 3 26 8 - 34 3 pg    MCHC 31 9 31 4 - 37 4 g/dL    RDW 14 2 11 6 - 15 1 %    MPV 9 0 8 9 - 12 7 fL    Platelets 430 999 - 255 Thousands/uL    nRBC 0 /100 WBCs    Neutrophils Relative 67 43 - 75 %    Immat GRANS % 1 0 - 2 %    Lymphocytes Relative 20 14 - 44 % Monocytes Relative 9 4 - 12 %    Eosinophils Relative 3 0 - 6 %    Basophils Relative 0 0 - 1 %    Neutrophils Absolute 5 15 1 85 - 7 62 Thousands/µL    Immature Grans Absolute 0 05 0 00 - 0 20 Thousand/uL    Lymphocytes Absolute 1 52 0 60 - 4 47 Thousands/µL    Monocytes Absolute 0 65 0 17 - 1 22 Thousand/µL    Eosinophils Absolute 0 26 0 00 - 0 61 Thousand/µL    Basophils Absolute 0 02 0 00 - 0 10 Thousands/µL   Comprehensive metabolic panel    Collection Time: 01/10/23  8:12 PM   Result Value Ref Range    Sodium 139 135 - 147 mmol/L    Potassium 4 0 3 5 - 5 3 mmol/L    Chloride 103 96 - 108 mmol/L    CO2 29 21 - 32 mmol/L    ANION GAP 7 5 - 14 mmol/L    BUN 12 5 - 25 mg/dL    Creatinine 0 70 0 60 - 1 20 mg/dL    Glucose 117 (H) 70 - 99 mg/dL    Calcium 9 4 8 4 - 10 2 mg/dL    AST 19 14 - 36 U/L    ALT 19 <35 U/L    Alkaline Phosphatase 77 43 - 122 U/L    Total Protein 7 0 6 4 - 8 4 g/dL    Albumin 4 1 3 5 - 5 0 g/dL    Total Bilirubin 0 47 0 20 - 1 00 mg/dL    eGFR 100 ml/min/1 73sq m   C-reactive protein    Collection Time: 01/10/23  8:12 PM   Result Value Ref Range    CRP 51 1 (H) <10 0 mg/L   CK (with reflex to MB)    Collection Time: 01/10/23  8:12 PM   Result Value Ref Range    Total CK 79 30 - 135 U/L   Fingerstick Glucose (POCT)    Collection Time: 01/10/23 10:19 PM   Result Value Ref Range    POC Glucose 102 65 - 140 mg/dl   COVID/FLU/RSV    Collection Time: 01/10/23 11:49 PM    Specimen: Nose; Nares   Result Value Ref Range    SARS-CoV-2 Positive (A) Negative    INFLUENZA A PCR Negative Negative    INFLUENZA B PCR Negative Negative    RSV PCR Negative Negative   ECG 12 lead    Collection Time: 01/11/23  2:12 PM   Result Value Ref Range    Ventricular Rate 97 BPM    Atrial Rate 97 BPM    NJ Interval 156 ms    QRSD Interval 76 ms    QT Interval 362 ms    QTC Interval 459 ms    P Axis 58 degrees    QRS Axis -1 degrees    T Wave Axis 74 degrees     Progress Toward Goals: No significant events in the past 24 hours  Will follow Clozaril level - this was collected when pt was on 100mg am and 400mg HS  COVID symptoms improving slowly, pt denies SOB, coughing less  Will go up on Clozaril tonight to 250mg HS, will increase by 50mg daily as tolerated until Covid symptoms resolve  Principal Problem:    Schizoaffective disorder, bipolar type ([facility] [address] )  Active Problems:    LYNN (generalized anxiety disorder)    Post-traumatic stress disorder, chronic    Gastroesophageal reflux disease    Discharge planning update: The patient will return to previous living arrangement, group home - Plan for ECT per 3P team but pt insisting she would no longer agree to continue because of " memory problems"   We will complete a MOCA early next week, pt verbalized understanding     Recommended Treatment: Continue with pharmacotherapy, group therapy, milieu therapy and occupational therapy    The patient will be maintained on the following medications:  Current Facility-Administered Medications   Medication Dose Route Frequency Provider Last Rate   • acetaminophen  650 mg Oral Q6H PRN Bobbette Pillion, CRNP     • acetaminophen  650 mg Oral Q4H PRN Bobbette Pillion, CRNP     • acetaminophen  975 mg Oral Q6H PRN Bobbette Pillion, CRNP     • albuterol  2 puff Inhalation Q6H PRN Bobbette Pillion, CRNP     • aluminum-magnesium hydroxide-simethicone  30 mL Oral Q4H PRN Bobbette Pillion, CRNP     • benzonatate  100 mg Oral TID PRN Bobbette Pillion, CRNP     • haloperidol lactate  2 5 mg Intramuscular Q6H PRN Max [address] [facility], [address]      And   • LORazepam  1 mg Intramuscular Q6H PRN Max [address] [facility], [address]      And   • benztropine  0 5 mg Intramuscular Q6H PRN Max [address] [facility], [address]     • benztropine  1 mg Intramuscular Q4H PRN Max [address] [facility], [address]     • haloperidol lactate  5 mg Intramuscular Q4H PRN Max [address] [facility], [address]      And   • LORazepam  2 mg Intramuscular Q4H PRN Max [address], [place]      And   • benztropine  1 mg Intramuscular Q4H PRN Max [address], [place]     • benztropine  1 mg Oral Q4H PRN Max [address], [place]     • calcium carbonate  500 mg Oral Daily PRN RENATA MunsonNP     • cholecalciferol  1,000 Units Oral Daily [facility] [place] [place]     • cloZAPine  100 mg Oral Daily López Jacques [place]     • cloZAPine  200 mg Oral HS Jacquelyn Delaney MD     • hydrOXYzine HCL  50 mg Oral Q6H PRN Max [address], [place]      Or   • diphenhydrAMINE  50 mg Intramuscular Q6H PRN JARED Munson     • docusate sodium  100 mg Oral BID JARED Robertson     • fenofibrate  145 mg Oral Daily [facility] JARED Pfeiffer     • fluticasone  1 spray Each Nare BID JARED Munson     • glycerin-hypromellose-  1 drop Both Eyes Q3H PRN JARED Munson     • guaiFENesin  600 mg Oral Q12H [address], [place]     • haloperidol  0 5 mg Oral Q4H PRN Max 6/day [place], [place]     • haloperidol  1 mg Oral BID PRN JARED Munson     • haloperidol  12 5 mg Oral BID JARED Munson     • haloperidol  2 mg Oral Daily PRN JARED Munson     • hydrocortisone   Topical 4x Daily PRN JARED Munson     • hydrocortisone   Topical 4x Daily PRN JARED Munson     • hydrOXYzine HCL  100 mg Oral Q6H PRN Max [address], [place]      Or   • LORazepam  2 mg Intramuscular Q6H PRN JARED Munson     • hydrOXYzine HCL  25 mg Oral Q6H PRN Max [address], [place]     • lidocaine  1 patch Topical Daily [place], [place]     • magnesium hydroxide  30 mL Oral Daily PRN RENATA MunsonNP     • methocarbamol  500 mg Oral Q6H PRN JARED Munson     • nicotine  1 patch Transdermal Daily [facility] [place]     • nicotine polacrilex  4 mg Oral Q2H PRN JARED Yang     • ondansetron  4 mg Oral Q6H PRN JARED Sotomayor     • pantoprazole  40 mg Oral Early Morning [facility] [place]     • phenol  1 spray Mouth/Throat Q2H PRN Milind Joyce PA-C     • polyethylene glycol  17 g Oral Daily JARED Yang     • propranolol  10 mg Oral Q8H PRN JARED Sotomayor     • senna-docusate sodium  1 tablet Oral Daily PRN JARED Sotomayor     • senna-docusate sodium  2 tablet Oral HS Faith Gonzalez, JARED     • sorbitol  30 mL Oral Q1H PRN JARED Sotomayor     • SUMAtriptan  50 mg Oral Daily PRN JARED Sotomayor     • traZODone  50 mg Oral HS PRN JARED Sotomayor     • venlafaxine  225 mg Oral Daily JARED Sotomayor       Facility-Administered Medications Ordered in Other Encounters   Medication Dose Route Frequency Provider Last Rate   • glycopyrrolate   Intravenous PRN Sylvia Matta CRNA     • ketorolac   Intravenous PRN Sylvia Matta CRNA     • sodium chloride   Intravenous Continuous PRN Sylvia Matta CRNA

## 2023-01-12 NOTE — PLAN OF CARE
Problem: Alteration in Thoughts and Perception  Goal: Treatment Goal: Gain control of psychotic behaviors/thinking, reduce/eliminate presenting symptoms and demonstrate improved reality functioning upon discharge  Outcome: Not Progressing  Goal: Verbalize thoughts and feelings  Description: Interventions:  - Promote a nonjudgmental and trusting relationship with the patient through active listening and therapeutic communication  - Assess patient's level of functioning, behavior and potential for risk  - Engage patient in 1 on 1 interactions  - Encourage patient to express fears, feelings, frustrations, and discuss symptoms    - [place] patient to reality, help patient recognize reality-based thinking   - Administer medications as ordered and assess for potential side effects  - Provide the patient education related to the signs and symptoms of the illness and desired effects of prescribed medications  Outcome: Not Progressing  Goal: Refrain from acting on delusional thinking/internal stimuli  Description: Interventions:  - Monitor patient closely, per order   - Utilize least restrictive measures   - Set reasonable limits, give positive feedback for acceptable   - Administer medications as ordered and monitor of potential side effects  Outcome: Not Progressing  Goal: Agree to be compliant with medication regime, as prescribed and report medication side effects  Description: Interventions:  - Offer appropriate PRN medication and supervise ingestion; conduct AIMS, as needed   Outcome: Not Progressing  Goal: Recognize dysfunctional thoughts, communicate reality-based thoughts at the time of discharge  Description: Interventions:  - Provide medication and psycho-education to assist patient in compliance and developing insight into his/her illness   Outcome: Not Progressing  Goal: Complete daily ADLs, including personal hygiene independently, as able  Description: Interventions:  - Observe, teach, and assist patient with ADLS  - Monitor and promote a balance of rest/activity, with adequate nutrition and elimination   Outcome: Not Progressing     Problem: Ineffective Coping  Goal: Identifies ineffective coping skills  Outcome: Not Progressing  Goal: Identifies healthy coping skills  Outcome: Not Progressing  Goal: Demonstrates healthy coping skills  Outcome: Not Progressing  Goal: Patient/Family participate in treatment and DC plans  Description: Interventions:  - Provide therapeutic environment  Outcome: Not Progressing  Goal: Patient/Family verbalizes awareness of resources  Outcome: Not Progressing  Goal: Understands least restrictive measures  Description: Interventions:  - Utilize least restrictive behavior  Outcome: Not Progressing  Goal: Free from restraint events  Description: - Utilize least restrictive measures   - Provide behavioral interventions   - Redirect inappropriate behaviors   Outcome: Not Progressing     Problem: Risk for Self Injury/Neglect  Goal: Treatment Goal: Remain safe during length of stay, learn and adopt new coping skills, and be free of self-injurious ideation, impulses and acts at the time of discharge  Outcome: Not Progressing  Goal: Refrain from harming self  Description: Interventions:  - Monitor patient closely, per order  - Develop a trusting relationship  - Supervise medication ingestion, monitor effects and side effects   Outcome: Not Progressing  Goal: Recognize maladaptive responses and adopt new coping mechanisms  Outcome: Not Progressing     Problem: Depression  Goal: Treatment Goal: Demonstrate behavioral control of depressive symptoms, verbalize feelings of improved mood/affect, and adopt new coping skills prior to discharge  Outcome: Not Progressing  Goal: Refrain from isolation  Description: Interventions:  - Develop a trusting relationship   - Encourage socialization   Outcome: Not Progressing  Goal: Refrain from self-neglect  Outcome: Not Progressing     Problem: Anxiety  Goal: Anxiety is at manageable level  Description: Interventions:  - Assess and monitor patient's anxiety level  - Monitor for signs and symptoms (heart palpitations, chest pain, shortness of breath, headaches, nausea, feeling jumpy, restlessness, irritable, apprehensive)  - Collaborate with interdisciplinary team and initiate plan and interventions as ordered    - [place] patient to unit/surroundings  - Explain treatment plan  - Encourage participation in care  - Encourage verbalization of concerns/fears  - Identify coping mechanisms  - Assist in developing anxiety-reducing skills  - Administer/offer alternative therapies  - Limit or eliminate stimulants  Outcome: Not Progressing     Problem: Risk for Violence/Aggression Toward Others  Goal: Treatment Goal: Refrain from acts of violence/aggression during length of stay, and demonstrate improved impulse control at the time of discharge  Outcome: Not Progressing  Goal: Refrain from destructive acts on the environment or property  Outcome: Not Progressing  Goal: Control angry outbursts  Description: Interventions:  - Monitor patient closely, per order  - Ensure early verbal de-escalation  - Monitor prn medication needs  - Set reasonable/therapeutic limits, outline behavioral expectations, and consequences   - Provide a non-threatening milieu, utilizing the least restrictive interventions   Outcome: Not Progressing  Goal: Identify appropriate positive anger management techniques  Description: Interventions:  - Offer anger management and coping skills groups   - Staff will provide positive feedback for appropriate anger control  Outcome: Not Progressing

## 2023-01-13 LAB
CLOZAPINE SERPL-MCNC: 944 NG/ML (ref 350–650)
CLOZAPINE+NOR SERPL-MCNC: 1657 NG/ML
NORCLOZAPINE SERPL-MCNC: 713 NG/ML

## 2023-01-13 RX ADMIN — HALOPERIDOL 12.5 MG: 1 TABLET ORAL at 17:31

## 2023-01-13 RX ADMIN — FLUTICASONE PROPIONATE 1 SPRAY: 50 SPRAY, METERED NASAL at 08:46

## 2023-01-13 RX ADMIN — POLYETHYLENE GLYCOL 3350 17 G: 17 POWDER, FOR SOLUTION ORAL at 08:45

## 2023-01-13 RX ADMIN — HALOPERIDOL 0.5 MG: 0.5 TABLET ORAL at 11:45

## 2023-01-13 RX ADMIN — SENNOSIDES AND DOCUSATE SODIUM 2 TABLET: 8.6; 5 TABLET ORAL at 21:03

## 2023-01-13 RX ADMIN — DOCUSATE SODIUM 100 MG: 100 CAPSULE, LIQUID FILLED ORAL at 08:42

## 2023-01-13 RX ADMIN — NICOTINE 1 PATCH: 21 PATCH, EXTENDED RELEASE TRANSDERMAL at 08:44

## 2023-01-13 RX ADMIN — VENLAFAXINE HYDROCHLORIDE 225 MG: 37.5 CAPSULE, EXTENDED RELEASE ORAL at 08:42

## 2023-01-13 RX ADMIN — GUAIFENESIN 600 MG: 600 TABLET, EXTENDED RELEASE ORAL at 21:05

## 2023-01-13 RX ADMIN — HALOPERIDOL 12.5 MG: 1 TABLET ORAL at 08:42

## 2023-01-13 RX ADMIN — FLUTICASONE PROPIONATE 1 SPRAY: 50 SPRAY, METERED NASAL at 17:32

## 2023-01-13 RX ADMIN — GUAIFENESIN 600 MG: 600 TABLET, EXTENDED RELEASE ORAL at 08:42

## 2023-01-13 RX ADMIN — CHOLECALCIFEROL TAB 25 MCG (1000 UNIT) 1000 UNITS: 25 TAB at 08:42

## 2023-01-13 RX ADMIN — CLOZAPINE 300 MG: 100 TABLET ORAL at 21:03

## 2023-01-13 RX ADMIN — FENOFIBRATE 145 MG: 145 TABLET, COATED ORAL at 08:42

## 2023-01-13 RX ADMIN — DOCUSATE SODIUM 100 MG: 100 CAPSULE, LIQUID FILLED ORAL at 17:31

## 2023-01-13 RX ADMIN — MAGNESIUM HYDROXIDE 30 ML: 400 SUSPENSION ORAL at 21:03

## 2023-01-13 RX ADMIN — PANTOPRAZOLE SODIUM 40 MG: 40 TABLET, DELAYED RELEASE ORAL at 06:33

## 2023-01-13 RX ADMIN — CLOZAPINE 100 MG: 100 TABLET ORAL at 08:42

## 2023-01-13 RX ADMIN — BENZONATATE 100 MG: 100 CAPSULE ORAL at 11:20

## 2023-01-13 NOTE — PROGRESS NOTES
Progress Note - Behavioral Health   Chester Green 47 y o  female MRN: [id number]  Unit/Bed#[facility] 464-52 Encounter: [id number]    The patient was seen for continuing care and reviewed with treatment team   Per RN, pt is calm and pleasant but remains paranoid and continues to report AVH  Today I met her in her room, she agreed to come out for interview  She tells me AH are improved today but she continues to hear various voices talking to themselves   She also feels unsafe, as if she will be attacked by something, she was scanning the room at times  She reports VH of colors and shapes, reality testing ability was possible but limited  She has been eating , sleeping well  She is isolative but comes out of her room at intervals  No constipation, pt denies hypersalivation, No fever, no chestpain,   She fells slight congestion but is not SOB    /85 (BP Location: Left arm)   Pulse 99   Temp 97 5 °F (36 4 °C) (Temporal)   Resp 16   LMP  (LMP Unknown)   SpO2 92%       Current Mental Status Evaluation:  Appearance:   fair hygiene and grooming    Behavior:  calm and cooperative   Mood:  ' , afraid and anxious"   Affect: Anxious, she was scanning the room at times, looking at walks   Speech: Normal volume and Normal rate   Thought Process:  Goal directed and coherent   Thought Content:  Paranoid and mistrustful   Perceptual Disturbances:  Auditory hallucinations without commands and Visual hallucinations   Risk Potential: No suicidal or homicidal ideation   Orientation:   Patient is alert, awake and oriented x 3   Patient has limited insight, Judgement and impulse control      Recent Results (from the past 72 hour(s))   CBC and differential    Collection Time: 01/10/23  8:12 PM   Result Value Ref Range    WBC 7 65 4 31 - 10 16 Thousand/uL    RBC 4 14 3 81 - 5 12 Million/uL    Hemoglobin 11 7 11 5 - 15 4 g/dL    Hematocrit 36 7 34 8 - 46 1 %    MCV 89 82 - 98 fL    MCH 28 3 26 8 - 34 3 pg    MCHC 31 9 31 4 - 37 4 g/dL    RDW 14 2 11 6 - 15 1 %    MPV 9 0 8 9 - 12 7 fL    Platelets 187 519 - 785 Thousands/uL    nRBC 0 /100 WBCs    Neutrophils Relative 67 43 - 75 %    Immat GRANS % 1 0 - 2 %    Lymphocytes Relative 20 14 - 44 %    Monocytes Relative 9 4 - 12 %    Eosinophils Relative 3 0 - 6 %    Basophils Relative 0 0 - 1 %    Neutrophils Absolute 5 15 1 85 - 7 62 Thousands/µL    Immature Grans Absolute 0 05 0 00 - 0 20 Thousand/uL    Lymphocytes Absolute 1 52 0 60 - 4 47 Thousands/µL    Monocytes Absolute 0 65 0 17 - 1 22 Thousand/µL    Eosinophils Absolute 0 26 0 00 - 0 61 Thousand/µL    Basophils Absolute 0 02 0 00 - 0 10 Thousands/µL   Comprehensive metabolic panel    Collection Time: 01/10/23  8:12 PM   Result Value Ref Range    Sodium 139 135 - 147 mmol/L    Potassium 4 0 3 5 - 5 3 mmol/L    Chloride 103 96 - 108 mmol/L    CO2 29 21 - 32 mmol/L    ANION GAP 7 5 - 14 mmol/L    BUN 12 5 - 25 mg/dL    Creatinine 0 70 0 60 - 1 20 mg/dL    Glucose 117 (H) 70 - 99 mg/dL    Calcium 9 4 8 4 - 10 2 mg/dL    AST 19 14 - 36 U/L    ALT 19 <35 U/L    Alkaline Phosphatase 77 43 - 122 U/L    Total Protein 7 0 6 4 - 8 4 g/dL    Albumin 4 1 3 5 - 5 0 g/dL    Total Bilirubin 0 47 0 20 - 1 00 mg/dL    eGFR 100 ml/min/1 73sq m   C-reactive protein    Collection Time: 01/10/23  8:12 PM   Result Value Ref Range    CRP 51 1 (H) <10 0 mg/L   CK (with reflex to MB)    Collection Time: 01/10/23  8:12 PM   Result Value Ref Range    Total CK 79 30 - 135 U/L   Fingerstick Glucose (POCT)    Collection Time: 01/10/23 10:19 PM   Result Value Ref Range    POC Glucose 102 65 - 140 mg/dl   COVID/FLU/RSV    Collection Time: 01/10/23 11:49 PM    Specimen: Nose; Nares   Result Value Ref Range    SARS-CoV-2 Positive (A) Negative    INFLUENZA A PCR Negative Negative    INFLUENZA B PCR Negative Negative    RSV PCR Negative Negative   ECG 12 lead    Collection Time: 01/11/23  2:12 PM   Result Value Ref Range    Ventricular Rate 97 BPM    Atrial Rate 97 BPM CA Interval 156 ms    QRSD Interval 76 ms    QT Interval 362 ms    QTC Interval 459 ms    P Axis 58 degrees    QRS Axis -1 degrees    T Wave Axis 74 degrees   ECG 12 lead    Collection Time: 01/12/23  6:31 AM   Result Value Ref Range    Ventricular Rate 102 BPM    Atrial Rate 102 BPM    CA Interval 150 ms    QRSD Interval 76 ms    QT Interval 368 ms    QTC Interval 479 ms    P Axis 65 degrees    QRS Axis 9 degrees    T Wave Axis 77 degrees     Progress Toward Goals: No significant events in the past 24 hours  Covid symptoms improving, no SOB today - will go up on HS clozaril to 300mg HS with plan to continue to go up over the weekend as tolerated  ANC stable     Will follow Clozaril level - this was collected when pt was on 100mg AM and 400mg HS  Principal Problem:    Schizoaffective disorder, bipolar type ([facility] [address] )  Active Problems:    LYNN (generalized anxiety disorder)    Post-traumatic stress disorder, chronic    Gastroesophageal reflux disease    Discharge planning update: The patient will return to previous living arrangement, group home - We will complete a MOCA early next week, pt declines need to resume ECT after quarantine  Recommended Treatment: Continue with pharmacotherapy, group therapy, milieu therapy and occupational therapy    The patient will be maintained on the following medications:  Current Facility-Administered Medications   Medication Dose Route Frequency Provider Last Rate   • acetaminophen  650 mg Oral Q6H PRN JARED Hayes     • acetaminophen  650 mg Oral Q4H PRN JARED Hayes     • acetaminophen  975 mg Oral Q6H PRN JARED Hayes     • albuterol  2 puff Inhalation Q6H PRN JARED Hayes     • aluminum-magnesium hydroxide-simethicone  30 mL Oral Q4H PRN JARED Hayes     • benzonatate  100 mg Oral TID PRN JARED Hayes     • haloperidol lactate  2 5 mg Intramuscular Q6H PRN Max 4/day Sam Dang [place], CRNP      And   • LORazepam  1 mg Intramuscular Q6H PRN Max [address] [facility] [place], [address]      And   • benztropine  0 5 mg Intramuscular Q6H PRN Max [address] [facility] [place], [address]     • benztropine  1 mg Intramuscular Q4H PRN Max [address] [facility] [place], [address]     • haloperidol lactate  5 mg Intramuscular Q4H PRN Max [address] [facility] [place], [address]      And   • LORazepam  2 mg Intramuscular Q4H PRN Max [address] [facility] [place], [address]      And   • benztropine  1 mg Intramuscular Q4H PRN Max [address] [facility] [place], [address]     • benztropine  1 mg Oral Q4H PRN Max [address] [facility] [place], [address]     • calcium carbonate  500 mg Oral Daily PRN Margrette Drone, CRNP     • cholecalciferol  1,000 Units Oral Daily Margrette Drone, [address]     • cloZAPine  100 mg Oral Daily Margrette Drone, [address]     • cloZAPine  250 mg Oral HS Gregoria Figueredo MD     • hydrOXYzine HCL  50 mg Oral Q6H PRN Max [address] [facility] [place], [address]      Or   • diphenhydrAMINE  50 mg Intramuscular Q6H PRN Margrette Drone, CRNP     • docusate sodium  100 mg Oral BID [facility] [place], CRNP     • fenofibrate  145 mg Oral Daily [facility] [place], CRNP     • fluticasone  1 spray Each Nare BID Margrette Drone, CRNP     • glycerin-hypromellose-  1 drop Both Eyes Q3H PRN Margrette Drone, CRNP     • guaiFENesin  600 mg Oral Q12H [address], [address]     • haloperidol  0 5 mg Oral Q4H PRN Max 6/day [facility], [address]     • haloperidol  1 mg Oral BID PRN Margrette Drone, CRNP     • haloperidol  12 5 mg Oral BID Margrette Drone, CRNP     • haloperidol  2 mg Oral Daily PRN Margrette Drone, CRNP     • hydrocortisone   Topical 4x Daily PRN Margrette Drone, CRNP     • hydrocortisone   Topical 4x Daily PRN Margrette Drone, CRNP     • hydrOXYzine HCL  100 mg Oral Q6H PRN Max [address] [facility] [place], [address]      Or   • LORazepam  2 mg Intramuscular Q6H PRN Arvella Born, CRNP     • hydrOXYzine HCL  25 mg Oral Q6H PRN Max [address] [facility], [address]     • lidocaine  1 patch Topical Daily Arvella Born, [address]     • magnesium hydroxide  30 mL Oral Daily PRN Arvella Born, CRNP     • methocarbamol  500 mg Oral Q6H PRN Arvella Born, CRNP     • nicotine  1 patch Transdermal Daily Arvella Born, [address]     • nicotine polacrilex  4 mg Oral Q2H PRN Arvella Born, CRNP     • ondansetron  4 mg Oral Q6H PRN Arvella Born, CRNP     • pantoprazole  40 mg Oral Early Morning [facility], JARED     • phenol  1 spray Mouth/Throat Q2H PRN Alexys Davenport PA-C     • polyethylene glycol  17 g Oral Daily [facility] [place], CRNP     • propranolol  10 mg Oral Q8H PRN Arvella Born, CRNP     • senna-docusate sodium  1 tablet Oral Daily PRN Arvella Born, CRNP     • senna-docusate sodium  2 tablet Oral HS Faith Ledezma, CRNP     • sorbitol  30 mL Oral Q1H PRN Arvella Born, CRNP     • SUMAtriptan  50 mg Oral Daily PRN Arvella Born, CRNP     • traZODone  50 mg Oral HS PRN Arvella Born, CRNP     • venlafaxine  225 mg Oral Daily [facility]

## 2023-01-13 NOTE — PLAN OF CARE
Problem: Alteration in Thoughts and Perception  Goal: Verbalize thoughts and feelings  Description: Interventions:  - Promote a nonjudgmental and trusting relationship with the patient through active listening and therapeutic communication  - Assess patient's level of functioning, behavior and potential for risk  - Engage patient in 1 on 1 interactions  - Encourage patient to express fears, feelings, frustrations, and discuss symptoms    - [place] patient to reality, help patient recognize reality-based thinking   - Administer medications as ordered and assess for potential side effects  - Provide the patient education related to the signs and symptoms of the illness and desired effects of prescribed medications  Outcome: Progressing  Goal: Agree to be compliant with medication regime, as prescribed and report medication side effects  Description: Interventions:  - Offer appropriate PRN medication and supervise ingestion; conduct AIMS, as needed   Outcome: Progressing  Goal: Complete daily ADLs, including personal hygiene independently, as able  Description: Interventions:  - Observe, teach, and assist patient with ADLS  - Monitor and promote a balance of rest/activity, with adequate nutrition and elimination   Outcome: Progressing     Problem: Ineffective Coping  Goal: Understands least restrictive measures  Description: Interventions:  - Utilize least restrictive behavior  Outcome: Progressing  Goal: Free from restraint events  Description: - Utilize least restrictive measures   - Provide behavioral interventions   - Redirect inappropriate behaviors   Outcome: Progressing     Problem: Risk for Self Injury/Neglect  Goal: Treatment Goal: Remain safe during length of stay, learn and adopt new coping skills, and be free of self-injurious ideation, impulses and acts at the time of discharge  Outcome: Progressing  Goal: Refrain from harming self  Description: Interventions:  - Monitor patient closely, per order  - Develop a trusting relationship  - Supervise medication ingestion, monitor effects and side effects   Outcome: Progressing  Goal: Recognize maladaptive responses and adopt new coping mechanisms  Outcome: Progressing     Problem: Depression  Goal: Treatment Goal: Demonstrate behavioral control of depressive symptoms, verbalize feelings of improved mood/affect, and adopt new coping skills prior to discharge  Outcome: Progressing  Goal: Refrain from isolation  Description: Interventions:  - Develop a trusting relationship   - Encourage socialization   Outcome: Progressing  Goal: Refrain from self-neglect  Outcome: Progressing     Problem: Anxiety  Goal: Anxiety is at manageable level  Description: Interventions:  - Assess and monitor patient's anxiety level  - Monitor for signs and symptoms (heart palpitations, chest pain, shortness of breath, headaches, nausea, feeling jumpy, restlessness, irritable, apprehensive)  - Collaborate with interdisciplinary team and initiate plan and interventions as ordered    - [place] patient to unit/surroundings  - Explain treatment plan  - Encourage participation in care  - Encourage verbalization of concerns/fears  - Identify coping mechanisms  - Assist in developing anxiety-reducing skills  - Administer/offer alternative therapies  - Limit or eliminate stimulants  Outcome: Progressing     Problem: Risk for Violence/Aggression Toward Others  Goal: Treatment Goal: Refrain from acts of violence/aggression during length of stay, and demonstrate improved impulse control at the time of discharge  Outcome: Progressing  Goal: Refrain from destructive acts on the environment or property  Outcome: Progressing  Goal: Control angry outbursts  Description: Interventions:  - Monitor patient closely, per order  - Ensure early verbal de-escalation  - Monitor prn medication needs  - Set reasonable/therapeutic limits, outline behavioral expectations, and consequences   - Provide a non-threatening milieu, utilizing the least restrictive interventions   Outcome: Progressing  Goal: Identify appropriate positive anger management techniques  Description: Interventions:  - Offer anger management and coping skills groups   - Staff will provide positive feedback for appropriate anger control  Outcome: Progressing

## 2023-01-13 NOTE — NURSING NOTE
Patient is making multiple phone calls throughout the shift, and  continues to reports V/AH  PRN haldol given at 1145 for mild agitation  Patient reports anxiety 2/4, and 5/10 for depression  Patient vss, good intake, and steady gait  No further distress noted, will continue to monitor

## 2023-01-14 RX ADMIN — CLOZAPINE 300 MG: 100 TABLET ORAL at 21:30

## 2023-01-14 RX ADMIN — CLOZAPINE 100 MG: 100 TABLET ORAL at 08:26

## 2023-01-14 RX ADMIN — FLUTICASONE PROPIONATE 1 SPRAY: 50 SPRAY, METERED NASAL at 17:11

## 2023-01-14 RX ADMIN — FENOFIBRATE 145 MG: 145 TABLET, COATED ORAL at 08:26

## 2023-01-14 RX ADMIN — POLYETHYLENE GLYCOL 3350 17 G: 17 POWDER, FOR SOLUTION ORAL at 08:26

## 2023-01-14 RX ADMIN — DOCUSATE SODIUM 100 MG: 100 CAPSULE, LIQUID FILLED ORAL at 08:26

## 2023-01-14 RX ADMIN — HALOPERIDOL 2 MG: 1 TABLET ORAL at 13:02

## 2023-01-14 RX ADMIN — GUAIFENESIN 600 MG: 600 TABLET, EXTENDED RELEASE ORAL at 21:30

## 2023-01-14 RX ADMIN — GUAIFENESIN 600 MG: 600 TABLET, EXTENDED RELEASE ORAL at 08:26

## 2023-01-14 RX ADMIN — VENLAFAXINE HYDROCHLORIDE 225 MG: 37.5 CAPSULE, EXTENDED RELEASE ORAL at 08:26

## 2023-01-14 RX ADMIN — SENNOSIDES AND DOCUSATE SODIUM 2 TABLET: 8.6; 5 TABLET ORAL at 21:30

## 2023-01-14 RX ADMIN — HALOPERIDOL 12.5 MG: 1 TABLET ORAL at 17:11

## 2023-01-14 RX ADMIN — HALOPERIDOL 12.5 MG: 1 TABLET ORAL at 08:26

## 2023-01-14 RX ADMIN — FLUTICASONE PROPIONATE 1 SPRAY: 50 SPRAY, METERED NASAL at 08:27

## 2023-01-14 RX ADMIN — NICOTINE 1 PATCH: 21 PATCH, EXTENDED RELEASE TRANSDERMAL at 08:27

## 2023-01-14 RX ADMIN — BENZONATATE 100 MG: 100 CAPSULE ORAL at 23:59

## 2023-01-14 RX ADMIN — CHOLECALCIFEROL TAB 25 MCG (1000 UNIT) 1000 UNITS: 25 TAB at 08:26

## 2023-01-14 RX ADMIN — PANTOPRAZOLE SODIUM 40 MG: 40 TABLET, DELAYED RELEASE ORAL at 06:38

## 2023-01-14 RX ADMIN — DOCUSATE SODIUM 100 MG: 100 CAPSULE, LIQUID FILLED ORAL at 17:11

## 2023-01-14 NOTE — PROGRESS NOTES
Progress Note - Behavioral Health     Nori Reason 47 y o  female MRN: [id number]   Unit/Bed#: [place] 199-38 Encounter: [id number]      Subjective: The patient chart reviewed and case discussed with the nurse  The patient was seen in her room today  She reports she is overall doing well and denied any concern  Denies feeling depressed or anxious  Reports appetite, sleep, energy level has been fine  Did not endorse any paranoia or delusional ideation during the meeting  Denies any auditory or visual hallucination  Reports compliance with the medication and denies any side effect  As per the nurse the patient has been visible in the milieu  Compliant with the medication and meals  No symptoms reported though in the afternoon today she had complained of hallucination but unable to describe to the staff  Also expressed anger towards her debit card company  Patient requested and had got Haldol 2 mg          ROS: all other systems are negative    Mental Status Evaluation:    Appearance:  casually dressed   Behavior:  cooperative, calm   Speech:  normal rate, normal volume, normal pitch   Mood:  normal   Affect:  constricted   Thought Process:  logical   Associations: intact associations   Thought Content:  no overt delusions   Perceptual Disturbances: Check above   Risk Potential: Suicidal ideation - None  Homicidal ideation - None  Potential for aggression - No   Sensorium:  oriented to person, place and time/date   Memory:  recent and remote memory grossly intact   Consciousness:  alert and awake   Attention: attention span and concentration are age appropriate   Insight:  fair   Judgment: fair   Gait/Station: in bed   Motor Activity: no abnormal movements     Vital signs in last 24 hours:    Temp:  [97 1 °F (36 2 °C)-98 3 °F (36 8 °C)] 97 1 °F (36 2 °C)  HR:  [80-96] 90  Resp:  [16-20] 20  BP: (105-120)/(67-80) 115/69    Laboratory results: I have personally reviewed all pertinent laboratory/tests results  Progress Toward Goals: improving    Assessment/Plan  The patient overall doing better but occasional complaints of hallucination  Plan is to continue with the current medication with no changes  We will continue to monitor her for her mood, behavior, safety, sleep and response to medication  Principal Problem:    Schizoaffective disorder, bipolar type (HCC)  Active Problems:    LYNN (generalized anxiety disorder)    Post-traumatic stress disorder, chronic    Gastroesophageal reflux disease    Recommended Treatment:     Planned medication and treatment changes:     All current active medications have been reviewed  Encourage group therapy, milieu therapy and occupational therapy  Behavioral Health checks every 7 minutes  Continue current medications:  Current Facility-Administered Medications   Medication Dose Route Frequency Provider Last Rate   • acetaminophen  650 mg Oral Q6H PRN JARED Billingsley     • acetaminophen  650 mg Oral Q4H PRN JARED Billingsley     • acetaminophen  975 mg Oral Q6H PRN JARED Billingsley     • albuterol  2 puff Inhalation Q6H PRN JARED Billingsley     • aluminum-magnesium hydroxide-simethicone  30 mL Oral Q4H PRN JARED Billingsley     • benzonatate  100 mg Oral TID PRN JARED Billingsley     • haloperidol lactate  2 5 mg Intramuscular Q6H PRN Max [address] [place], [place]      And   • LORazepam  1 mg Intramuscular Q6H PRN Max [address] [place], [place]      And   • benztropine  0 5 mg Intramuscular Q6H PRN Max [address] [place], [place]     • benztropine  1 mg Intramuscular Q4H PRN Max [address] [place], [place]     • haloperidol lactate  5 mg Intramuscular Q4H PRN Max [address] [place], [place]      And   • LORazepam  2 mg Intramuscular Q4H PRN Max [address] [place], [place]      And   • benztropine  1 mg Intramuscular Q4H PRN Max [address] [facility] CRNP     • benztropine  1 mg Oral Q4H PRN Max [address]  [facility], [address]     • calcium carbonate  500 mg Oral Daily [facility]N [facility]     • cholecalciferol  1,000 Units Oral Daily [facility], [address]     • cloZAPine  100 mg Oral Daily [facility], [address]     • cloZAPine  300 mg Oral HS Joey Chavez MD     • hydrOXYzine HCL  50 mg Oral Q6H PRN Max [address] [facility], [address]      Or   • diphenhydrAMINE  50 mg Intramuscular Q6H PRN [facility], CRNP     • docusate sodium  100 mg Oral BID [facility] JARED Pfeiffer     • fenofibrate  145 mg Oral Daily [facility] JARED Pfeiffer     • fluticasone  1 spray Each Nare BID [facility], RENATANP     • glycerin-hypromellose-  1 drop Both Eyes Q3H PRN [facility]     • guaiFENesin  600 mg Oral Q12H [address], [address]     • haloperidol  0 5 mg Oral Q4H PRN Max 6/day [facility], [address]     • haloperidol  1 mg Oral BID PRN [facility]     • haloperidol  12 5 mg Oral BID [facility], CRNP     • haloperidol  2 mg Oral Daily PRN [facility], CRNP     • hydrocortisone   Topical 4x Daily PRN [facility]     • hydrocortisone   Topical 4x Daily PRN [facility], RENATANP     • hydrOXYzine HCL  100 mg Oral Q6H PRN Max [address]  [place] [facility], [address]      Or   • LORazepam  2 mg Intramuscular Q6H PRN [facility], RENATANP     • hydrOXYzine HCL  25 mg Oral Q6H PRN Max [address]  [facility], [address]     • lidocaine  1 patch Topical Daily [facility], [address]     • magnesium hydroxide  30 mL Oral Daily PRN [facility]     • methocarbamol  500 mg Oral Q6H PRN [facility]     • nicotine  1 patch Transdermal Daily Boni Pfeiffer, [address]     • nicotine polacrilex  4 mg Oral Q2H PRN JARED Hayes     • ondansetron  4 mg Oral Q6H PRN Nael Ramirez JARED Buckley     • pantoprazole  40 mg Oral Early Morning JARED Robertson     • phenol  1 spray Mouth/Throat Q2H PRN Katerin Diana PA-C     • polyethylene glycol  17 g Oral Daily JARED Robertson     • propranolol  10 mg Oral Q8H PRN JARED Munson     • senna-docusate sodium  1 tablet Oral Daily PRN JARED Munson     • senna-docusate sodium  2 tablet Oral HS JARED Robertson     • sorbitol  30 mL Oral Q1H PRN JARED Munson     • SUMAtriptan  50 mg Oral Daily PRN JARED Munson     • traZODone  50 mg Oral HS PRN JARED Munson     • venlafaxine  225 mg Oral Daily JARED Munson         Risks / Benefits of Treatment:    Risks, benefits, and possible side effects of medications explained to patient and patient verbalizes understanding and agreement for treatment  Counseling / Coordination of Care:    Patient's progress discussed with staff in treatment team meeting  Medications, treatment progress and treatment plan reviewed with patient      Iram Mendiola MD 01/14/23

## 2023-01-14 NOTE — NURSING NOTE
Patient calm, pleasant and cooperative on approach  Pt is visible in milieu  Rates anxiety 2/4 and denies all other s/s  Pt is medication compliant  MOM requested and administered at 2103 for constipation   Q 7 min safety checks ongoing

## 2023-01-14 NOTE — PLAN OF CARE
Problem: Alteration in Thoughts and Perception  Goal: Treatment Goal: Gain control of psychotic behaviors/thinking, reduce/eliminate presenting symptoms and demonstrate improved reality functioning upon discharge  Outcome: Progressing  Goal: Verbalize thoughts and feelings  Description: Interventions:  - Promote a nonjudgmental and trusting relationship with the patient through active listening and therapeutic communication  - Assess patient's level of functioning, behavior and potential for risk  - Engage patient in 1 on 1 interactions  - Encourage patient to express fears, feelings, frustrations, and discuss symptoms    - [place] patient to reality, help patient recognize reality-based thinking   - Administer medications as ordered and assess for potential side effects  - Provide the patient education related to the signs and symptoms of the illness and desired effects of prescribed medications  Outcome: Progressing  Goal: Refrain from acting on delusional thinking/internal stimuli  Description: Interventions:  - Monitor patient closely, per order   - Utilize least restrictive measures   - Set reasonable limits, give positive feedback for acceptable   - Administer medications as ordered and monitor of potential side effects  Outcome: Progressing  Goal: Agree to be compliant with medication regime, as prescribed and report medication side effects  Description: Interventions:  - Offer appropriate PRN medication and supervise ingestion; conduct AIMS, as needed   Outcome: Progressing  Goal: Recognize dysfunctional thoughts, communicate reality-based thoughts at the time of discharge  Description: Interventions:  - Provide medication and psycho-education to assist patient in compliance and developing insight into his/her illness   Outcome: Progressing  Goal: Complete daily ADLs, including personal hygiene independently, as able  Description: Interventions:  - Observe, teach, and assist patient with ADLS  - Monitor and promote a balance of rest/activity, with adequate nutrition and elimination   Outcome: Progressing     Problem: Ineffective Coping  Goal: Identifies ineffective coping skills  Outcome: Progressing  Goal: Identifies healthy coping skills  Outcome: Progressing  Goal: Demonstrates healthy coping skills  Outcome: Progressing  Goal: Patient/Family participate in treatment and DC plans  Description: Interventions:  - Provide therapeutic environment  Outcome: Progressing  Goal: Patient/Family verbalizes awareness of resources  Outcome: Progressing  Goal: Understands least restrictive measures  Description: Interventions:  - Utilize least restrictive behavior  Outcome: Progressing  Goal: Free from restraint events  Description: - Utilize least restrictive measures   - Provide behavioral interventions   - Redirect inappropriate behaviors   Outcome: Progressing     Problem: Risk for Self Injury/Neglect  Goal: Treatment Goal: Remain safe during length of stay, learn and adopt new coping skills, and be free of self-injurious ideation, impulses and acts at the time of discharge  Outcome: Progressing  Goal: Refrain from harming self  Description: Interventions:  - Monitor patient closely, per order  - Develop a trusting relationship  - Supervise medication ingestion, monitor effects and side effects   Outcome: Progressing  Goal: Recognize maladaptive responses and adopt new coping mechanisms  Outcome: Progressing     Problem: Depression  Goal: Treatment Goal: Demonstrate behavioral control of depressive symptoms, verbalize feelings of improved mood/affect, and adopt new coping skills prior to discharge  Outcome: Progressing  Goal: Refrain from isolation  Description: Interventions:  - Develop a trusting relationship   - Encourage socialization   Outcome: Progressing  Goal: Refrain from self-neglect  Outcome: Progressing     Problem: Anxiety  Goal: Anxiety is at manageable level  Description: Interventions:  - Assess and monitor patient's anxiety level  - Monitor for signs and symptoms (heart palpitations, chest pain, shortness of breath, headaches, nausea, feeling jumpy, restlessness, irritable, apprehensive)  - Collaborate with interdisciplinary team and initiate plan and interventions as ordered    - [place] patient to unit/surroundings  - Explain treatment plan  - Encourage participation in care  - Encourage verbalization of concerns/fears  - Identify coping mechanisms  - Assist in developing anxiety-reducing skills  - Administer/offer alternative therapies  - Limit or eliminate stimulants  Outcome: Progressing     Problem: Risk for Violence/Aggression Toward Others  Goal: Treatment Goal: Refrain from acts of violence/aggression during length of stay, and demonstrate improved impulse control at the time of discharge  Outcome: Progressing  Goal: Refrain from destructive acts on the environment or property  Outcome: Progressing  Goal: Control angry outbursts  Description: Interventions:  - Monitor patient closely, per order  - Ensure early verbal de-escalation  - Monitor prn medication needs  - Set reasonable/therapeutic limits, outline behavioral expectations, and consequences   - Provide a non-threatening milieu, utilizing the least restrictive interventions   Outcome: Progressing  Goal: Identify appropriate positive anger management techniques  Description: Interventions:  - Offer anger management and coping skills groups   - Staff will provide positive feedback for appropriate anger control  Outcome: Progressing

## 2023-01-14 NOTE — NURSING NOTE
Patient visible for majority of shift, making phone calls, otherwise lying quietly in bed  Compliant with meals and scheduled medications  Would not rate anxiety or depression but stated she had some of each  Initially denied SI/HI/AH/VH, however, approached nurses station @ 1300 stating she's having hallucinations but cannot describe them  Also stated she's very agitated with her debit card company  Requested and received PRN Haldol 2 mg @ 1302  Safety checks ongoing

## 2023-01-14 NOTE — PLAN OF CARE
Problem: Alteration in Thoughts and Perception  Goal: Agree to be compliant with medication regime, as prescribed and report medication side effects  Description: Interventions:  - Offer appropriate PRN medication and supervise ingestion; conduct AIMS, as needed   Outcome: Progressing     Problem: Alteration in Thoughts and Perception  Goal: Complete daily ADLs, including personal hygiene independently, as able  Description: Interventions:  - Observe, teach, and assist patient with ADLS  - Monitor and promote a balance of rest/activity, with adequate nutrition and elimination   Outcome: Progressing     Problem: Anxiety  Goal: Anxiety is at manageable level  Description: Interventions:  - Assess and monitor patient's anxiety level  - Monitor for signs and symptoms (heart palpitations, chest pain, shortness of breath, headaches, nausea, feeling jumpy, restlessness, irritable, apprehensive)  - Collaborate with interdisciplinary team and initiate plan and interventions as ordered    - [place] patient to unit/surroundings  - Explain treatment plan  - Encourage participation in care  - Encourage verbalization of concerns/fears  - Identify coping mechanisms  - Assist in developing anxiety-reducing skills  - Administer/offer alternative therapies  - Limit or eliminate stimulants  Outcome: Progressing

## 2023-01-15 LAB
ATRIAL RATE: 97 BPM
P AXIS: 58 DEGREES
PR INTERVAL: 156 MS
QRS AXIS: -1 DEGREES
QRSD INTERVAL: 76 MS
QT INTERVAL: 362 MS
QTC INTERVAL: 459 MS
T WAVE AXIS: 74 DEGREES
VENTRICULAR RATE: 97 BPM

## 2023-01-15 RX ADMIN — GUAIFENESIN 600 MG: 600 TABLET, EXTENDED RELEASE ORAL at 08:20

## 2023-01-15 RX ADMIN — HALOPERIDOL 12.5 MG: 1 TABLET ORAL at 08:20

## 2023-01-15 RX ADMIN — HALOPERIDOL 12.5 MG: 1 TABLET ORAL at 17:08

## 2023-01-15 RX ADMIN — DOCUSATE SODIUM 100 MG: 100 CAPSULE, LIQUID FILLED ORAL at 08:21

## 2023-01-15 RX ADMIN — FLUTICASONE PROPIONATE 1 SPRAY: 50 SPRAY, METERED NASAL at 08:21

## 2023-01-15 RX ADMIN — NICOTINE 1 PATCH: 21 PATCH, EXTENDED RELEASE TRANSDERMAL at 08:23

## 2023-01-15 RX ADMIN — DOCUSATE SODIUM 100 MG: 100 CAPSULE, LIQUID FILLED ORAL at 17:08

## 2023-01-15 RX ADMIN — SENNOSIDES AND DOCUSATE SODIUM 2 TABLET: 8.6; 5 TABLET ORAL at 20:33

## 2023-01-15 RX ADMIN — HALOPERIDOL 2 MG: 1 TABLET ORAL at 09:46

## 2023-01-15 RX ADMIN — CLOZAPINE 300 MG: 100 TABLET ORAL at 21:33

## 2023-01-15 RX ADMIN — POLYETHYLENE GLYCOL 3350 17 G: 17 POWDER, FOR SOLUTION ORAL at 08:21

## 2023-01-15 RX ADMIN — FENOFIBRATE 145 MG: 145 TABLET, COATED ORAL at 08:20

## 2023-01-15 RX ADMIN — CLOZAPINE 100 MG: 100 TABLET ORAL at 08:21

## 2023-01-15 RX ADMIN — PANTOPRAZOLE SODIUM 40 MG: 40 TABLET, DELAYED RELEASE ORAL at 06:24

## 2023-01-15 RX ADMIN — GUAIFENESIN 600 MG: 600 TABLET, EXTENDED RELEASE ORAL at 21:33

## 2023-01-15 RX ADMIN — VENLAFAXINE HYDROCHLORIDE 225 MG: 37.5 CAPSULE, EXTENDED RELEASE ORAL at 08:20

## 2023-01-15 RX ADMIN — FLUTICASONE PROPIONATE 1 SPRAY: 50 SPRAY, METERED NASAL at 19:00

## 2023-01-15 RX ADMIN — MAGNESIUM HYDROXIDE 30 ML: 400 SUSPENSION ORAL at 13:00

## 2023-01-15 RX ADMIN — CHOLECALCIFEROL TAB 25 MCG (1000 UNIT) 1000 UNITS: 25 TAB at 08:21

## 2023-01-15 NOTE — NURSING NOTE
Patient flat affect, denies any needs on that shift  Patient is alert and oriented, vss, and meds compliant  Patient in bed with eyes close  Will continue to monitor

## 2023-01-15 NOTE — NURSING NOTE
Patient visible in the milieu throughout shift, making phone calls  Social with staff and peers, compliant with meals and scheduled medications  Stated she had "some" anxiety, however, would not rate  Denied depression, SI/HI/AH/VH  Able to make needs known  Safety checks ongoing

## 2023-01-15 NOTE — PROGRESS NOTES
Progress Note - Behavioral Health     Scarlet Friedman 47 y o  female MRN: [id number]   Unit/Bed#: [facility] 991-72 Encounter: [id number]      Subjective: The patient chart reviewed and case discussed with the nurse  The patient was seen in her room today  She reports she is emotionally overall doing well and denied any concern  Denies feeling depressed or anxious  Reports appetite, sleep has been fine  She though complains of feeling physically weak  Denies though any symptoms of COVID though was noted to be coughing few times during the meeting  did not endorse any paranoia or delusional ideation during the meeting  Denies any auditory or visual hallucination  Reports compliance with the medication and denies any side effect  As per the nurse the patient was noticed to be screaming in her room  she told the nurse when she went to check in that she cannot take it  Though did not  elaborate on it  She was given Haldol 2 mg at 9:46 AM and did very well after that  As per the nurse she came out later and apologized  She denied any concern for safety to the nurse at that time  She also denied any SI or HI or any hallucinations when I checked with her later          ROS: all other systems are negative    Mental Status Evaluation:    Appearance:  casually dressed   Behavior:  cooperative, calm   Speech:  normal rate, normal volume, normal pitch   Mood:  normal   Affect:  constricted   Thought Process:  logical   Associations: intact associations   Thought Content:  no overt delusions   Perceptual Disturbances: Check above   Risk Potential: Suicidal ideation - None  Homicidal ideation - None  Potential for aggression - No   Sensorium:  oriented to person, place and time/date   Memory:  recent and remote memory grossly intact   Consciousness:  alert and awake   Attention: attention span and concentration are age appropriate   Insight:  fair   Judgment: fair   Gait/Station: in bed   Motor Activity: no abnormal movements     Vital signs in last 24 hours:    Temp:  [97 °F (36 1 °C)-97 7 °F (36 5 °C)] 97 °F (36 1 °C)  HR:  [82-97] 97  Resp:  [18-20] 18  BP: (104-115)/(65-75) 115/75    Laboratory results: I have personally reviewed all pertinent laboratory/tests results  Progress Toward Goals: improving    Assessment/Plan  The patient overall doing better but occasional hallucination  Plan is to continue with the current medication with no changes  We will continue to monitor her for her mood, behavior, safety, sleep and response to medication  Principal Problem:    Schizoaffective disorder, bipolar type (HCC)  Active Problems:    LYNN (generalized anxiety disorder)    Post-traumatic stress disorder, chronic    Gastroesophageal reflux disease    Recommended Treatment:     Planned medication and treatment changes:     All current active medications have been reviewed  Encourage group therapy, milieu therapy and occupational therapy  Behavioral Health checks every 7 minutes  Continue current medications:  Current Facility-Administered Medications   Medication Dose Route Frequency Provider Last Rate   • acetaminophen  650 mg Oral Q6H PRN JARED Villalpando     • acetaminophen  650 mg Oral Q4H PRN JARED Villalpando     • acetaminophen  975 mg Oral Q6H PRN JARED Villalpando     • albuterol  2 puff Inhalation Q6H PRN JARED Villalpando     • aluminum-magnesium hydroxide-simethicone  30 mL Oral Q4H PRN JARED Villalpando     • benzonatate  100 mg Oral TID PRN JARED Villalpando     • haloperidol lactate  2 5 mg Intramuscular Q6H PRN Max [address] [place] [address] [address]      And   • LORazepam  1 mg Intramuscular Q6H PRN Max [address] [place] [address] [address]      And   • benztropine  0 5 mg Intramuscular Q6H PRN Max [address] [place] [address] [address]     • benztropine  1 mg Intramuscular Q4H PRN Max 6/day JARED Robertson     • haloperidol lactate  5 mg Intramuscular Q4H PRN Max [address] [facility]       And   • LORazepam  2 mg Intramuscular Q4H PRN Max [address] [facility]       And   • benztropine  1 mg Intramuscular Q4H PRN Max [address] [facility], [address]     • benztropine  1 mg Oral Q4H PRN Max [address] [facility], [address]     • calcium carbonate  500 mg Oral Daily PRN JARED Osorio     • cholecalciferol  1,000 Units Oral Daily [facility], [address] [facility] [address]     • cloZAPine  100 mg Oral Daily [facility], [address] [facility] [address]     • cloZAPine  300 mg Oral HS Keenan Hernandez MD     • hydrOXYzine HCL  50 mg Oral Q6H PRN Max [address] [facility]       Or   • diphenhydrAMINE  50 mg Intramuscular Q6H PRN JARED Osorio     • docusate sodium  100 mg Oral BID [facility] JARED Pfeiffer     • fenofibrate  145 mg [facility] Daily [facility]     • fluticasone  1 spray Each Nare BID JARED Osorio     • glycerin-hypromellose-  1 drop Both Eyes Q3H PRN JARED Osorio     • guaiFENesin  600 mg Oral Q12H [address], [address]     • haloperidol  0 5 mg Oral Q4H PRN Max 6/day [facility]     • haloperidol  1 mg Oral BID PRN JARED Osorio     • haloperidol  12 5 mg Oral BID JARED Osorio     • haloperidol  2 mg Oral Daily PRN JARED Osorio     • hydrocortisone   Topical 4x Daily PRN JARED Osorio     • hydrocortisone   Topical 4x Daily PRN JARED Osorio     • hydrOXYzine HCL  100 mg Oral Q6H PRN Max [address] [facility]       Or   • LORazepam  2 mg Intramuscular Q6H PRN JARED Osorio     • hydrOXYzine HCL  25 mg Oral Q6H PRN Max [address] [facility], [address]     • lidocaine  1 patch Topical Daily [address]      • magnesium hydroxide  30 mL Oral Daily PRN JARED Osorio     • methocarbamol  500 mg Oral Q6H PRN JARED Sewell     • nicotine  1 patch Transdermal Daily [facility], [address]     • nicotine polacrilex  4 mg Oral Q2H PRN JARED Sewell     • ondansetron  4 mg Oral Q6H PRN JARED Sewell     • pantoprazole  40 mg Oral Early Morning [facility], [address]     • phenol  1 spray Mouth/Throat Q2H PRN Zari Alamo PA-C     • polyethylene glycol  17 g Oral Daily JARED Robertson     • propranolol  10 mg Oral Q8H PRN JARED Sewell     • senna-docusate sodium  1 tablet Oral Daily PRN JARED Sewell     • senna-docusate sodium  2 tablet Oral HS JARED Martin     • sorbitol  30 mL Oral Q1H PRN JARED Sewell     • SUMAtriptan  50 mg Oral Daily PRN JARED Sewell     • traZODone  50 mg Oral HS PRN JARED Sewell     • venlafaxine  225 mg Oral Daily JARED Sewell         Risks / Benefits of Treatment:    Risks, benefits, and possible side effects of medications explained to patient and patient verbalizes understanding and agreement for treatment  Counseling / Coordination of Care:    Patient's progress discussed with staff in treatment team meeting  Medications, treatment progress and treatment plan reviewed with patient      Justine Connors MD 01/15/23

## 2023-01-15 NOTE — NURSING NOTE
Patient screaming in room, disrupting unit  When asked what's wrong, patient stated "I can't take It!" Would not elaborate, however requested a PRN  PRN Haldol 2 mg administered @ 0946  Safety checks ongoing

## 2023-01-15 NOTE — PLAN OF CARE
Problem: Alteration in Thoughts and Perception  Goal: Treatment Goal: Gain control of psychotic behaviors/thinking, reduce/eliminate presenting symptoms and demonstrate improved reality functioning upon discharge  Outcome: Progressing  Goal: Verbalize thoughts and feelings  Description: Interventions:  - Promote a nonjudgmental and trusting relationship with the patient through active listening and therapeutic communication  - Assess patient's level of functioning, behavior and potential for risk  - Engage patient in 1 on 1 interactions  - Encourage patient to express fears, feelings, frustrations, and discuss symptoms    - [place] patient to reality, help patient recognize reality-based thinking   - Administer medications as ordered and assess for potential side effects  - Provide the patient education related to the signs and symptoms of the illness and desired effects of prescribed medications  Outcome: Progressing  Goal: Refrain from acting on delusional thinking/internal stimuli  Description: Interventions:  - Monitor patient closely, per order   - Utilize least restrictive measures   - Set reasonable limits, give positive feedback for acceptable   - Administer medications as ordered and monitor of potential side effects  Outcome: Progressing  Goal: Agree to be compliant with medication regime, as prescribed and report medication side effects  Description: Interventions:  - Offer appropriate PRN medication and supervise ingestion; conduct AIMS, as needed   Outcome: Progressing  Goal: Recognize dysfunctional thoughts, communicate reality-based thoughts at the time of discharge  Description: Interventions:  - Provide medication and psycho-education to assist patient in compliance and developing insight into his/her illness   Outcome: Progressing  Goal: Complete daily ADLs, including personal hygiene independently, as able  Description: Interventions:  - Observe, teach, and assist patient with ADLS  - Monitor and promote a balance of rest/activity, with adequate nutrition and elimination   Outcome: Progressing     Problem: Ineffective Coping  Goal: Identifies ineffective coping skills  Outcome: Progressing  Goal: Identifies healthy coping skills  Outcome: Progressing  Goal: Demonstrates healthy coping skills  Outcome: Progressing  Goal: Patient/Family participate in treatment and DC plans  Description: Interventions:  - Provide therapeutic environment  Outcome: Progressing  Goal: Patient/Family verbalizes awareness of resources  Outcome: Progressing  Goal: Understands least restrictive measures  Description: Interventions:  - Utilize least restrictive behavior  Outcome: Progressing  Goal: Free from restraint events  Description: - Utilize least restrictive measures   - Provide behavioral interventions   - Redirect inappropriate behaviors   Outcome: Progressing     Problem: Risk for Self Injury/Neglect  Goal: Treatment Goal: Remain safe during length of stay, learn and adopt new coping skills, and be free of self-injurious ideation, impulses and acts at the time of discharge  Outcome: Progressing  Goal: Refrain from harming self  Description: Interventions:  - Monitor patient closely, per order  - Develop a trusting relationship  - Supervise medication ingestion, monitor effects and side effects   Outcome: Progressing  Goal: Recognize maladaptive responses and adopt new coping mechanisms  Outcome: Progressing     Problem: Depression  Goal: Treatment Goal: Demonstrate behavioral control of depressive symptoms, verbalize feelings of improved mood/affect, and adopt new coping skills prior to discharge  Outcome: Progressing  Goal: Refrain from isolation  Description: Interventions:  - Develop a trusting relationship   - Encourage socialization   Outcome: Progressing  Goal: Refrain from self-neglect  Outcome: Progressing     Problem: Anxiety  Goal: Anxiety is at manageable level  Description: Interventions:  - Assess and monitor patient's anxiety level  - Monitor for signs and symptoms (heart palpitations, chest pain, shortness of breath, headaches, nausea, feeling jumpy, restlessness, irritable, apprehensive)  - Collaborate with interdisciplinary team and initiate plan and interventions as ordered    - [place] patient to unit/surroundings  - Explain treatment plan  - Encourage participation in care  - Encourage verbalization of concerns/fears  - Identify coping mechanisms  - Assist in developing anxiety-reducing skills  - Administer/offer alternative therapies  - Limit or eliminate stimulants  Outcome: Progressing     Problem: Risk for Violence/Aggression Toward Others  Goal: Treatment Goal: Refrain from acts of violence/aggression during length of stay, and demonstrate improved impulse control at the time of discharge  Outcome: Progressing  Goal: Refrain from destructive acts on the environment or property  Outcome: Progressing  Goal: Control angry outbursts  Description: Interventions:  - Monitor patient closely, per order  - Ensure early verbal de-escalation  - Monitor prn medication needs  - Set reasonable/therapeutic limits, outline behavioral expectations, and consequences   - Provide a non-threatening milieu, utilizing the least restrictive interventions   Outcome: Progressing  Goal: Identify appropriate positive anger management techniques  Description: Interventions:  - Offer anger management and coping skills groups   - Staff will provide positive feedback for appropriate anger control  Outcome: Progressing

## 2023-01-16 RX ADMIN — PANTOPRAZOLE SODIUM 40 MG: 40 TABLET, DELAYED RELEASE ORAL at 06:38

## 2023-01-16 RX ADMIN — HALOPERIDOL 12.5 MG: 1 TABLET ORAL at 17:05

## 2023-01-16 RX ADMIN — POLYETHYLENE GLYCOL 3350 17 G: 17 POWDER, FOR SOLUTION ORAL at 08:07

## 2023-01-16 RX ADMIN — DOCUSATE SODIUM 100 MG: 100 CAPSULE, LIQUID FILLED ORAL at 17:06

## 2023-01-16 RX ADMIN — FENOFIBRATE 145 MG: 145 TABLET, COATED ORAL at 08:07

## 2023-01-16 RX ADMIN — HALOPERIDOL 1 MG: 1 TABLET ORAL at 14:25

## 2023-01-16 RX ADMIN — NICOTINE 1 PATCH: 21 PATCH, EXTENDED RELEASE TRANSDERMAL at 08:08

## 2023-01-16 RX ADMIN — HALOPERIDOL 12.5 MG: 1 TABLET ORAL at 08:07

## 2023-01-16 RX ADMIN — VENLAFAXINE HYDROCHLORIDE 225 MG: 37.5 CAPSULE, EXTENDED RELEASE ORAL at 08:07

## 2023-01-16 RX ADMIN — CLOZAPINE 100 MG: 100 TABLET ORAL at 08:08

## 2023-01-16 RX ADMIN — DOCUSATE SODIUM 100 MG: 100 CAPSULE, LIQUID FILLED ORAL at 08:07

## 2023-01-16 RX ADMIN — CHOLECALCIFEROL TAB 25 MCG (1000 UNIT) 1000 UNITS: 25 TAB at 08:07

## 2023-01-16 RX ADMIN — FLUTICASONE PROPIONATE 1 SPRAY: 50 SPRAY, METERED NASAL at 08:12

## 2023-01-16 RX ADMIN — GUAIFENESIN 600 MG: 600 TABLET, EXTENDED RELEASE ORAL at 08:07

## 2023-01-16 RX ADMIN — GUAIFENESIN 600 MG: 600 TABLET, EXTENDED RELEASE ORAL at 21:18

## 2023-01-16 RX ADMIN — CLOZAPINE 300 MG: 100 TABLET ORAL at 21:18

## 2023-01-16 NOTE — NURSING NOTE
Pt isolative to self nd room  Pt is calm and pleasant, cooperative with care and med compliant  Will continue to monitor

## 2023-01-16 NOTE — PLAN OF CARE
Problem: Alteration in Thoughts and Perception  Goal: Verbalize thoughts and feelings  Description: Interventions:  - Promote a nonjudgmental and trusting relationship with the patient through active listening and therapeutic communication  - Assess patient's level of functioning, behavior and potential for risk  - Engage patient in 1 on 1 interactions  - Encourage patient to express fears, feelings, frustrations, and discuss symptoms    - [place] patient to reality, help patient recognize reality-based thinking   - Administer medications as ordered and assess for potential side effects  - Provide the patient education related to the signs and symptoms of the illness and desired effects of prescribed medications  Outcome: Progressing  Goal: Refrain from acting on delusional thinking/internal stimuli  Description: Interventions:  - Monitor patient closely, per order   - Utilize least restrictive measures   - Set reasonable limits, give positive feedback for acceptable   - Administer medications as ordered and monitor of potential side effects  Outcome: Progressing

## 2023-01-16 NOTE — PLAN OF CARE
Problem: Alteration in Thoughts and Perception  Goal: Treatment Goal: Gain control of psychotic behaviors/thinking, reduce/eliminate presenting symptoms and demonstrate improved reality functioning upon discharge  Outcome: Progressing  Goal: Verbalize thoughts and feelings  Description: Interventions:  - Promote a nonjudgmental and trusting relationship with the patient through active listening and therapeutic communication  - Assess patient's level of functioning, behavior and potential for risk  - Engage patient in 1 on 1 interactions  - Encourage patient to express fears, feelings, frustrations, and discuss symptoms    - [place] patient to reality, help patient recognize reality-based thinking   - Administer medications as ordered and assess for potential side effects  - Provide the patient education related to the signs and symptoms of the illness and desired effects of prescribed medications  Outcome: Progressing  Goal: Refrain from acting on delusional thinking/internal stimuli  Description: Interventions:  - Monitor patient closely, per order   - Utilize least restrictive measures   - Set reasonable limits, give positive feedback for acceptable   - Administer medications as ordered and monitor of potential side effects  Outcome: Progressing  Goal: Agree to be compliant with medication regime, as prescribed and report medication side effects  Description: Interventions:  - Offer appropriate PRN medication and supervise ingestion; conduct AIMS, as needed   Outcome: Progressing  Goal: Recognize dysfunctional thoughts, communicate reality-based thoughts at the time of discharge  Description: Interventions:  - Provide medication and psycho-education to assist patient in compliance and developing insight into his/her illness   Outcome: Progressing  Goal: Complete daily ADLs, including personal hygiene independently, as able  Description: Interventions:  - Observe, teach, and assist patient with ADLS  - Monitor and promote a balance of rest/activity, with adequate nutrition and elimination   Outcome: Progressing     Problem: Ineffective Coping  Goal: Identifies ineffective coping skills  Outcome: Progressing  Goal: Identifies healthy coping skills  Outcome: Progressing  Goal: Demonstrates healthy coping skills  Outcome: Progressing  Goal: Patient/Family participate in treatment and DC plans  Description: Interventions:  - Provide therapeutic environment  Outcome: Progressing  Goal: Patient/Family verbalizes awareness of resources  Outcome: Progressing  Goal: Understands least restrictive measures  Description: Interventions:  - Utilize least restrictive behavior  Outcome: Progressing  Goal: Free from restraint events  Description: - Utilize least restrictive measures   - Provide behavioral interventions   - Redirect inappropriate behaviors   Outcome: Progressing     Problem: Risk for Self Injury/Neglect  Goal: Treatment Goal: Remain safe during length of stay, learn and adopt new coping skills, and be free of self-injurious ideation, impulses and acts at the time of discharge  Outcome: Progressing  Goal: Refrain from harming self  Description: Interventions:  - Monitor patient closely, per order  - Develop a trusting relationship  - Supervise medication ingestion, monitor effects and side effects   Outcome: Progressing  Goal: Recognize maladaptive responses and adopt new coping mechanisms  Outcome: Progressing     Problem: Depression  Goal: Treatment Goal: Demonstrate behavioral control of depressive symptoms, verbalize feelings of improved mood/affect, and adopt new coping skills prior to discharge  Outcome: Progressing  Goal: Refrain from isolation  Description: Interventions:  - Develop a trusting relationship   - Encourage socialization   Outcome: Progressing  Goal: Refrain from self-neglect  Outcome: Progressing     Problem: Anxiety  Goal: Anxiety is at manageable level  Description: Interventions:  - Assess and monitor patient's anxiety level  - Monitor for signs and symptoms (heart palpitations, chest pain, shortness of breath, headaches, nausea, feeling jumpy, restlessness, irritable, apprehensive)  - Collaborate with interdisciplinary team and initiate plan and interventions as ordered    - [place] patient to unit/surroundings  - Explain treatment plan  - Encourage participation in care  - Encourage verbalization of concerns/fears  - Identify coping mechanisms  - Assist in developing anxiety-reducing skills  - Administer/offer alternative therapies  - Limit or eliminate stimulants  Outcome: Progressing     Problem: Risk for Violence/Aggression Toward Others  Goal: Treatment Goal: Refrain from acts of violence/aggression during length of stay, and demonstrate improved impulse control at the time of discharge  Outcome: Progressing  Goal: Refrain from destructive acts on the environment or property  Outcome: Progressing  Goal: Control angry outbursts  Description: Interventions:  - Monitor patient closely, per order  - Ensure early verbal de-escalation  - Monitor prn medication needs  - Set reasonable/therapeutic limits, outline behavioral expectations, and consequences   - Provide a non-threatening milieu, utilizing the least restrictive interventions   Outcome: Progressing  Goal: Identify appropriate positive anger management techniques  Description: Interventions:  - Offer anger management and coping skills groups   - Staff will provide positive feedback for appropriate anger control  Outcome: Progressing

## 2023-01-16 NOTE — NURSING NOTE
Pt is visible in acharya occasionally, withdrawn to self and polite on approach  Pt rates anxiety 3/4 and denies depression  Pt reports no SI/HI  Pt confirms AH and VH  States " I hear voices from people in my past and I see light going across my eyes like I am on drugs " Pt noted to be preoccupied with a "stalker" states " My stalker is here and I hear his voice  I know he has something to do with this " RN educated pt on coping skills and provided counseling, Pt accepted education  Pt reports no pain this shift  Pt reports LBM today 1/16  She is cooperative with medications and care

## 2023-01-16 NOTE — PROGRESS NOTES
Progress Note - Behavioral Health   Catia Hartman 47 y o  female MRN: [id number]  Unit/Bed#: [facility] 031-38 Encounter: [id number]    The patient was seen for continuing care and reviewed with treatment team  Patient seen today  around nursing station  She tells me she had a brief episode of screaming  Saturday evening because she felt like something was coming to get her but since she has been doing better, sh feels safe today  She required PRN haldol at that time  She reports AVH are less frequent, she feels safer on the unit  She understands she will  have to return to 3P unit after she complete  Sleep- good  Appetite- good  Energy: improving  No suicidal or homicidal ideations  No EPS, denies any side effects of medication  /70 (BP Location: Right arm)   Pulse 100   Temp (!) 97 4 °F (36 3 °C) (Temporal)   Resp 18   LMP  (LMP Unknown)   SpO2 94%     Current Mental Status Evaluation:  Appearance:  Adequate hygiene and grooming, casually dressed   Behavior:  calm and cooperative   Mood:  " ok"   Affect: constricted   Speech: Normal volume and Normal rate   Thought Process:  Goal directed and coherent   Thought Content:  Paranoid and mistrustful, no overt delusion   Perceptual Disturbances: Denies hallucinations and does not appear to be responding to internal stimuli at this time   Risk Potential: No suicidal or homicidal ideation   Orientation:   Patient is alert, awake and oriented x 3   Patient has fairinsight, judgment and impulse control       Recent Results (from the past 168 hour(s))   CBC and differential    Collection Time: 01/10/23  8:12 PM   Result Value Ref Range    WBC 7 65 4 31 - 10 16 Thousand/uL    RBC 4 14 3 81 - 5 12 Million/uL    Hemoglobin 11 7 11 5 - 15 4 g/dL    Hematocrit 36 7 34 8 - 46 1 %    MCV 89 82 - 98 fL    MCH 28 3 26 8 - 34 3 pg    MCHC 31 9 31 4 - 37 4 g/dL    RDW 14 2 11 6 - 15 1 %    MPV 9 0 8 9 - 12 7 fL    Platelets 855 837 - 791 Thousands/uL    nRBC 0 /100 WBCs Neutrophils Relative 67 43 - 75 %    Immat GRANS % 1 0 - 2 %    Lymphocytes Relative 20 14 - 44 %    Monocytes Relative 9 4 - 12 %    Eosinophils Relative 3 0 - 6 %    Basophils Relative 0 0 - 1 %    Neutrophils Absolute 5 15 1 85 - 7 62 Thousands/µL    Immature Grans Absolute 0 05 0 00 - 0 20 Thousand/uL    Lymphocytes Absolute 1 52 0 60 - 4 47 Thousands/µL    Monocytes Absolute 0 65 0 17 - 1 22 Thousand/µL    Eosinophils Absolute 0 26 0 00 - 0 61 Thousand/µL    Basophils Absolute 0 02 0 00 - 0 10 Thousands/µL   Comprehensive metabolic panel    Collection Time: 01/10/23  8:12 PM   Result Value Ref Range    Sodium 139 135 - 147 mmol/L    Potassium 4 0 3 5 - 5 3 mmol/L    Chloride 103 96 - 108 mmol/L    CO2 29 21 - 32 mmol/L    ANION GAP 7 5 - 14 mmol/L    BUN 12 5 - 25 mg/dL    Creatinine 0 70 0 60 - 1 20 mg/dL    Glucose 117 (H) 70 - 99 mg/dL    Calcium 9 4 8 4 - 10 2 mg/dL    AST 19 14 - 36 U/L    ALT 19 <35 U/L    Alkaline Phosphatase 77 43 - 122 U/L    Total Protein 7 0 6 4 - 8 4 g/dL    Albumin 4 1 3 5 - 5 0 g/dL    Total Bilirubin 0 47 0 20 - 1 00 mg/dL    eGFR 100 ml/min/1 73sq m   Clozapine    Collection Time: 01/10/23  8:12 PM   Result Value Ref Range    Clozapine Lvl 944 (H) 350 - 650 ng/mL    NorClozapine 713 Not Estab  ng/mL    Total(Cloz+Norcloz) 1657 ng/mL   C-reactive protein    Collection Time: 01/10/23  8:12 PM   Result Value Ref Range    CRP 51 1 (H) <10 0 mg/L   CK (with reflex to MB)    Collection Time: 01/10/23  8:12 PM   Result Value Ref Range    Total CK 79 30 - 135 U/L   Fingerstick Glucose (POCT)    Collection Time: 01/10/23 10:19 PM   Result Value Ref Range    POC Glucose 102 65 - 140 mg/dl   COVID/FLU/RSV    Collection Time: 01/10/23 11:49 PM    Specimen: Nose; Nares   Result Value Ref Range    SARS-CoV-2 Positive (A) Negative    INFLUENZA A PCR Negative Negative    INFLUENZA B PCR Negative Negative    RSV PCR Negative Negative   ECG 12 lead    Collection Time: 01/11/23  2:12 PM   Result Value Ref Range    Ventricular Rate 97 BPM    Atrial Rate 97 BPM    CO Interval 156 ms    QRSD Interval 76 ms    QT Interval 362 ms    QTC Interval 459 ms    P Axis 58 degrees    QRS Axis -1 degrees    T Wave Axis 74 degrees   ECG 12 lead    Collection Time: 01/11/23  2:12 PM   Result Value Ref Range    Ventricular Rate 97 BPM    Atrial Rate 97 BPM    CO Interval 156 ms    QRSD Interval 76 ms    QT Interval 362 ms    QTC Interval 459 ms    P Axis 58 degrees    QRS Axis -1 degrees    T Wave Axis 74 degrees   ECG 12 lead    Collection Time: 01/12/23  6:31 AM   Result Value Ref Range    Ventricular Rate 102 BPM    Atrial Rate 102 BPM    CO Interval 150 ms    QRSD Interval 76 ms    QT Interval 368 ms    QTC Interval 479 ms    P Axis 65 degrees    QRS Axis 9 degrees    T Wave Axis 77 degrees       Progress Toward Goals:  Some progress this morning , Clozapine level collected on 1/10/23 at dose of ( 100mg AM and 400mg PM )  is supra therapeutic at 944( nle range 350-650ng/ml)    Assessment     Principal Problem:    Schizoaffective disorder, bipolar type (HCC)  Active Problems:    LYNN (generalized anxiety disorder)    Post-traumatic stress disorder, chronic    Gastroesophageal reflux disease        Plan :    - Medications;   Psychiatric: Will continue Clozaril at 100mg AM and 300mg ( given the supra therapeutic dose at higher dose of 500mg daily)   Medical: per SLIM    -Therapy: occupational therapy, milieu and group therapy  - Legal: 201   -Disposition: Likely return to  after COVID quarantine then to  group home

## 2023-01-16 NOTE — NURSING NOTE
Patient is flat affect, preocupied with medication, and paranoid  Patient states her lips are swollen, and that she notices it when looking in the mirror  Nurse offers reassurance that it is not swollen, but patient walked away mumbling that nobody cares  Patient continues to endorse A/VH  PRN haldol requested and received at 1425  Patient is medication and meals compliant  Patient in bed at the moment  Will continues to monitor

## 2023-01-17 RX ADMIN — POLYETHYLENE GLYCOL 3350 17 G: 17 POWDER, FOR SOLUTION ORAL at 08:15

## 2023-01-17 RX ADMIN — FLUTICASONE PROPIONATE 1 SPRAY: 50 SPRAY, METERED NASAL at 08:15

## 2023-01-17 RX ADMIN — FENOFIBRATE 145 MG: 145 TABLET, COATED ORAL at 08:15

## 2023-01-17 RX ADMIN — DOCUSATE SODIUM 100 MG: 100 CAPSULE, LIQUID FILLED ORAL at 18:07

## 2023-01-17 RX ADMIN — NICOTINE 1 PATCH: 21 PATCH, EXTENDED RELEASE TRANSDERMAL at 08:16

## 2023-01-17 RX ADMIN — VENLAFAXINE HYDROCHLORIDE 225 MG: 37.5 CAPSULE, EXTENDED RELEASE ORAL at 08:14

## 2023-01-17 RX ADMIN — CLOZAPINE 100 MG: 100 TABLET ORAL at 08:14

## 2023-01-17 RX ADMIN — HALOPERIDOL 12.5 MG: 1 TABLET ORAL at 08:13

## 2023-01-17 RX ADMIN — HALOPERIDOL 1 MG: 1 TABLET ORAL at 14:25

## 2023-01-17 RX ADMIN — GUAIFENESIN 600 MG: 600 TABLET, EXTENDED RELEASE ORAL at 21:00

## 2023-01-17 RX ADMIN — DOCUSATE SODIUM 100 MG: 100 CAPSULE, LIQUID FILLED ORAL at 08:14

## 2023-01-17 RX ADMIN — PANTOPRAZOLE SODIUM 40 MG: 40 TABLET, DELAYED RELEASE ORAL at 06:10

## 2023-01-17 RX ADMIN — CLOZAPINE 300 MG: 100 TABLET ORAL at 21:00

## 2023-01-17 RX ADMIN — CHOLECALCIFEROL TAB 25 MCG (1000 UNIT) 1000 UNITS: 25 TAB at 08:13

## 2023-01-17 RX ADMIN — SENNOSIDES AND DOCUSATE SODIUM 2 TABLET: 8.6; 5 TABLET ORAL at 21:00

## 2023-01-17 RX ADMIN — HALOPERIDOL 15 MG: 5 TABLET ORAL at 18:07

## 2023-01-17 RX ADMIN — GUAIFENESIN 600 MG: 600 TABLET, EXTENDED RELEASE ORAL at 08:15

## 2023-01-17 NOTE — NURSING NOTE
Patient visible on the unit intermittently to make phone calls and needs known, otherwise isolative to self in room  Patient brightens slightly on approach, cooperative with assessment questions  Patient currently denies depression, anxiety, SI/HI/AVH  Patient compliant with medications and meals, appetite good  No further signs of distress noted  VSS

## 2023-01-17 NOTE — CASE MANAGEMENT
01/17/23 0900   Team Meeting   Meeting Type Daily Rounds   Initial Conference Date 01/17/23   Team Members Present   Team Members Present Physician;Nurse;;; Occupational Therapist   Physician Team Member Dr Johanna Torre; Dr Maria Del Carmen Calles Team Member [address] [place] [address] Work Team Member Renay   OT Team Member Sully Abarca   Patient/Family Present   Patient Present No   Patient's Family Present No     3/4 anxiety, no depression, paranoid, reports A/VH, visible, no PRNs, slept, med compliant, calm,

## 2023-01-17 NOTE — NURSING NOTE
Patient is calm, visible, and social  Patient is medication compliant, and cooperative with care  Patient continues to report A/VH, and anxiety 2/4  PRN requested and received 1 mg haldol at 1425  Will continue to monitor

## 2023-01-17 NOTE — PROGRESS NOTES
Progress Note - Behavioral Health   Malissa Morgan 47 y o  female MRN: [id number]  Unit/Bed#[facility] 563-72 Encounter: [id number]    The patient was seen for continuing care and reviewed with treatment team  Pt calm and engaged in conversation  She reports intermittent AVH  She heard voice of Madi Hill, a man from her past  whom she believes is out to get her , is a "stalker "  She reports VH of light  She denies depressed mood today, No overt signs of sudheer  She remains isolative to her room  She comes out  to make phone calls and for meals  Sleep- good  Appetite- good, eats 100%  Energy: improving  No suicidal or homicidal ideations  No EPS, denies any side effects of medication  Today we talked about ECT and states she will be willing to restart the treatments after she completes quarantine, she also asked if EAC was an option for her  /66 (BP Location: Right arm)   Pulse 100   Temp (!) 96 2 °F (35 7 °C) (Temporal)   Resp 16   Wt 71 3 kg (157 lb 3 2 oz)   LMP  (LMP Unknown)   SpO2 92%   BMI 29 70 kg/m²     Current Mental Status Evaluation:  Appearance:  Adequate hygiene and grooming, casually dressed   Behavior:  calm and cooperative   Mood:  " ok"   Affect: constricted   Speech: Normal volume and Normal rate   Thought Process:  Goal directed and coherent   Thought Content:  Paranoid and mistrustful, delusional believes people are out to get her, stalking her    Perceptual Disturbances: Auditory hallucinations without commands    Risk Potential: No suicidal or homicidal ideation   Orientation:   Patient is alert, awake and oriented x 3   Patient has fair insight, judgment and impulse control       Recent Results (from the past 168 hour(s))   CBC and differential    Collection Time: 01/10/23  8:12 PM   Result Value Ref Range    WBC 7 65 4 31 - 10 16 Thousand/uL    RBC 4 14 3 81 - 5 12 Million/uL    Hemoglobin 11 7 11 5 - 15 4 g/dL    Hematocrit 36 7 34 8 - 46 1 %    MCV 89 82 - 98 fL    MCH 28 3 26 8 - 34 3 pg    MCHC 31 9 31 4 - 37 4 g/dL    RDW 14 2 11 6 - 15 1 %    MPV 9 0 8 9 - 12 7 fL    Platelets 119 612 - 487 Thousands/uL    nRBC 0 /100 WBCs    Neutrophils Relative 67 43 - 75 %    Immat GRANS % 1 0 - 2 %    Lymphocytes Relative 20 14 - 44 %    Monocytes Relative 9 4 - 12 %    Eosinophils Relative 3 0 - 6 %    Basophils Relative 0 0 - 1 %    Neutrophils Absolute 5 15 1 85 - 7 62 Thousands/µL    Immature Grans Absolute 0 05 0 00 - 0 20 Thousand/uL    Lymphocytes Absolute 1 52 0 60 - 4 47 Thousands/µL    Monocytes Absolute 0 65 0 17 - 1 22 Thousand/µL    Eosinophils Absolute 0 26 0 00 - 0 61 Thousand/µL    Basophils Absolute 0 02 0 00 - 0 10 Thousands/µL   Comprehensive metabolic panel    Collection Time: 01/10/23  8:12 PM   Result Value Ref Range    Sodium 139 135 - 147 mmol/L    Potassium 4 0 3 5 - 5 3 mmol/L    Chloride 103 96 - 108 mmol/L    CO2 29 21 - 32 mmol/L    ANION GAP 7 5 - 14 mmol/L    BUN 12 5 - 25 mg/dL    Creatinine 0 70 0 60 - 1 20 mg/dL    Glucose 117 (H) 70 - 99 mg/dL    Calcium 9 4 8 4 - 10 2 mg/dL    AST 19 14 - 36 U/L    ALT 19 <35 U/L    Alkaline Phosphatase 77 43 - 122 U/L    Total Protein 7 0 6 4 - 8 4 g/dL    Albumin 4 1 3 5 - 5 0 g/dL    Total Bilirubin 0 47 0 20 - 1 00 mg/dL    eGFR 100 ml/min/1 73sq m   Clozapine    Collection Time: 01/10/23  8:12 PM   Result Value Ref Range    Clozapine Lvl 944 (H) 350 - 650 ng/mL    NorClozapine 713 Not Estab  ng/mL    Total(Cloz+Norcloz) 1657 ng/mL   C-reactive protein    Collection Time: 01/10/23  8:12 PM   Result Value Ref Range    CRP 51 1 (H) <10 0 mg/L   CK (with reflex to MB)    Collection Time: 01/10/23  8:12 PM   Result Value Ref Range    Total CK 79 30 - 135 U/L   Fingerstick Glucose (POCT)    Collection Time: 01/10/23 10:19 PM   Result Value Ref Range    POC Glucose 102 65 - 140 mg/dl   COVID/FLU/RSV    Collection Time: 01/10/23 11:49 PM    Specimen: Nose; Nares   Result Value Ref Range    SARS-CoV-2 Positive (A) Negative INFLUENZA A PCR Negative Negative    INFLUENZA B PCR Negative Negative    RSV PCR Negative Negative   ECG 12 lead    Collection Time: 01/11/23  2:12 PM   Result Value Ref Range    Ventricular Rate 97 BPM    Atrial Rate 97 BPM    OK Interval 156 ms    QRSD Interval 76 ms    QT Interval 362 ms    QTC Interval 459 ms    P Axis 58 degrees    QRS Axis -1 degrees    T Wave Axis 74 degrees   ECG 12 lead    Collection Time: 01/11/23  2:12 PM   Result Value Ref Range    Ventricular Rate 97 BPM    Atrial Rate 97 BPM    OK Interval 156 ms    QRSD Interval 76 ms    QT Interval 362 ms    QTC Interval 459 ms    P Axis 58 degrees    QRS Axis -1 degrees    T Wave Axis 74 degrees   ECG 12 lead    Collection Time: 01/12/23  6:31 AM   Result Value Ref Range    Ventricular Rate 102 BPM    Atrial Rate 102 BPM    OK Interval 150 ms    QRSD Interval 76 ms    QT Interval 368 ms    QTC Interval 479 ms    P Axis 65 degrees    QRS Axis 9 degrees    T Wave Axis 77 degrees       Progress Toward Goals:   Some regression noted likely related to decreased Clozaril dose  Clozapine level collected on 1/10/23 at dose of ( 100mg AM and 400mg PM )  is supra therapeutic at 944( nle range 350-650ng/ml)   I will obtain a level on 100mg AM and 300mg HS dose )    Assessment     Principal Problem:    Schizoaffective disorder, bipolar type (HCC)  Active Problems:    LYNN (generalized anxiety disorder)    Post-traumatic stress disorder, chronic    Gastroesophageal reflux disease        Plan :    - Medications;   Psychiatric: Will continue Clozaril at 100mg AM and 300mg ( given the supra therapeutic dose at higher dose of 500mg daily)-- level today     Increase Haldol to 12 5mg AM and 15mg PM   Medical: per SLIM    -Therapy: occupational therapy, milieu and group therapy  - Legal: 201   -Disposition: Likely return to 3P after COVID quarantine- will resume ECT-

## 2023-01-17 NOTE — PLAN OF CARE
Problem: Alteration in Thoughts and Perception  Goal: Treatment Goal: Gain control of psychotic behaviors/thinking, reduce/eliminate presenting symptoms and demonstrate improved reality functioning upon discharge  Outcome: Progressing  Goal: Verbalize thoughts and feelings  Description: Interventions:  - Promote a nonjudgmental and trusting relationship with the patient through active listening and therapeutic communication  - Assess patient's level of functioning, behavior and potential for risk  - Engage patient in 1 on 1 interactions  - Encourage patient to express fears, feelings, frustrations, and discuss symptoms    - [place] patient to reality, help patient recognize reality-based thinking   - Administer medications as ordered and assess for potential side effects  - Provide the patient education related to the signs and symptoms of the illness and desired effects of prescribed medications  Outcome: Progressing  Goal: Refrain from acting on delusional thinking/internal stimuli  Description: Interventions:  - Monitor patient closely, per order   - Utilize least restrictive measures   - Set reasonable limits, give positive feedback for acceptable   - Administer medications as ordered and monitor of potential side effects  Outcome: Progressing  Goal: Agree to be compliant with medication regime, as prescribed and report medication side effects  Description: Interventions:  - Offer appropriate PRN medication and supervise ingestion; conduct AIMS, as needed   Outcome: Progressing  Goal: Recognize dysfunctional thoughts, communicate reality-based thoughts at the time of discharge  Description: Interventions:  - Provide medication and psycho-education to assist patient in compliance and developing insight into his/her illness   Outcome: Progressing  Goal: Complete daily ADLs, including personal hygiene independently, as able  Description: Interventions:  - Observe, teach, and assist patient with ADLS  - Monitor and promote a balance of rest/activity, with adequate nutrition and elimination   Outcome: Progressing     Problem: Ineffective Coping  Goal: Identifies ineffective coping skills  Outcome: Progressing  Goal: Identifies healthy coping skills  Outcome: Progressing  Goal: Demonstrates healthy coping skills  Outcome: Progressing  Goal: Patient/Family participate in treatment and DC plans  Description: Interventions:  - Provide therapeutic environment  Outcome: Progressing  Goal: Patient/Family verbalizes awareness of resources  Outcome: Progressing  Goal: Understands least restrictive measures  Description: Interventions:  - Utilize least restrictive behavior  Outcome: Progressing  Goal: Free from restraint events  Description: - Utilize least restrictive measures   - Provide behavioral interventions   - Redirect inappropriate behaviors   Outcome: Progressing     Problem: Risk for Self Injury/Neglect  Goal: Treatment Goal: Remain safe during length of stay, learn and adopt new coping skills, and be free of self-injurious ideation, impulses and acts at the time of discharge  Outcome: Progressing  Goal: Refrain from harming self  Description: Interventions:  - Monitor patient closely, per order  - Develop a trusting relationship  - Supervise medication ingestion, monitor effects and side effects   Outcome: Progressing  Goal: Recognize maladaptive responses and adopt new coping mechanisms  Outcome: Progressing     Problem: Depression  Goal: Treatment Goal: Demonstrate behavioral control of depressive symptoms, verbalize feelings of improved mood/affect, and adopt new coping skills prior to discharge  Outcome: Progressing  Goal: Refrain from isolation  Description: Interventions:  - Develop a trusting relationship   - Encourage socialization   Outcome: Progressing  Goal: Refrain from self-neglect  Outcome: Progressing     Problem: Anxiety  Goal: Anxiety is at manageable level  Description: Interventions:  - Assess and monitor patient's anxiety level  - Monitor for signs and symptoms (heart palpitations, chest pain, shortness of breath, headaches, nausea, feeling jumpy, restlessness, irritable, apprehensive)  - Collaborate with interdisciplinary team and initiate plan and interventions as ordered    - [place] patient to unit/surroundings  - Explain treatment plan  - Encourage participation in care  - Encourage verbalization of concerns/fears  - Identify coping mechanisms  - Assist in developing anxiety-reducing skills  - Administer/offer alternative therapies  - Limit or eliminate stimulants  Outcome: Progressing     Problem: Risk for Violence/Aggression Toward Others  Goal: Treatment Goal: Refrain from acts of violence/aggression during length of stay, and demonstrate improved impulse control at the time of discharge  Outcome: Progressing  Goal: Refrain from destructive acts on the environment or property  Outcome: Progressing  Goal: Control angry outbursts  Description: Interventions:  - Monitor patient closely, per order  - Ensure early verbal de-escalation  - Monitor prn medication needs  - Set reasonable/therapeutic limits, outline behavioral expectations, and consequences   - Provide a non-threatening milieu, utilizing the least restrictive interventions   Outcome: Progressing  Goal: Identify appropriate positive anger management techniques  Description: Interventions:  - Offer anger management and coping skills groups   - Staff will provide positive feedback for appropriate anger control  Outcome: Progressing

## 2023-01-18 RX ADMIN — FLUTICASONE PROPIONATE 1 SPRAY: 50 SPRAY, METERED NASAL at 17:01

## 2023-01-18 RX ADMIN — VENLAFAXINE HYDROCHLORIDE 225 MG: 37.5 CAPSULE, EXTENDED RELEASE ORAL at 08:05

## 2023-01-18 RX ADMIN — SENNOSIDES AND DOCUSATE SODIUM 2 TABLET: 8.6; 5 TABLET ORAL at 21:54

## 2023-01-18 RX ADMIN — CLOZAPINE 100 MG: 100 TABLET ORAL at 08:06

## 2023-01-18 RX ADMIN — DOCUSATE SODIUM 100 MG: 100 CAPSULE, LIQUID FILLED ORAL at 17:01

## 2023-01-18 RX ADMIN — GUAIFENESIN 600 MG: 600 TABLET, EXTENDED RELEASE ORAL at 08:06

## 2023-01-18 RX ADMIN — FENOFIBRATE 145 MG: 145 TABLET, COATED ORAL at 08:06

## 2023-01-18 RX ADMIN — DOCUSATE SODIUM 100 MG: 100 CAPSULE, LIQUID FILLED ORAL at 08:06

## 2023-01-18 RX ADMIN — HALOPERIDOL 12.5 MG: 1 TABLET ORAL at 08:05

## 2023-01-18 RX ADMIN — HALOPERIDOL 15 MG: 5 TABLET ORAL at 17:01

## 2023-01-18 RX ADMIN — PANTOPRAZOLE SODIUM 40 MG: 40 TABLET, DELAYED RELEASE ORAL at 06:17

## 2023-01-18 RX ADMIN — FLUTICASONE PROPIONATE 1 SPRAY: 50 SPRAY, METERED NASAL at 08:07

## 2023-01-18 RX ADMIN — POLYETHYLENE GLYCOL 3350 17 G: 17 POWDER, FOR SOLUTION ORAL at 08:05

## 2023-01-18 RX ADMIN — CLOZAPINE 300 MG: 100 TABLET ORAL at 21:54

## 2023-01-18 RX ADMIN — GUAIFENESIN 600 MG: 600 TABLET, EXTENDED RELEASE ORAL at 21:54

## 2023-01-18 RX ADMIN — CHOLECALCIFEROL TAB 25 MCG (1000 UNIT) 1000 UNITS: 25 TAB at 08:06

## 2023-01-18 RX ADMIN — NICOTINE 1 PATCH: 21 PATCH, EXTENDED RELEASE TRANSDERMAL at 08:07

## 2023-01-18 NOTE — PROGRESS NOTES
Progress Note - Behavioral Health   David Christine 47 y o  female MRN: [id number]  Unit/Bed#[facility] 898-07 Encounter: [id number]    The patient was seen for continuing care and reviewed with treatment team  Per RN pt has been isolative but brighter, requested PRN haldol for psychosis  Today, she remains calm and cooperative  She tells me she had nightmares about her daughter being hurt, in these nightmares she sees her daughter hanging off a gracie she is paranoid believing that there is someone out to get her daughter , the "meth people, because my daughter and I don't like junkies "   She believes that people used hypnosis on her  She reports VH of light forming on clouds  She has limited reality testing ability  She is tolerating the Clozaril and increased dose of Haldol  No EPS noted  Pt had last bowel movement on 1/16/23, will continue bowel regimen and offer PRN for constipation  BP 94/69 (BP Location: Right arm)   Pulse 88   Temp 97 5 °F (36 4 °C) (Temporal)   Resp 18   Wt 71 3 kg (157 lb 3 2 oz)   LMP  (LMP Unknown)   SpO2 92%   BMI 29 70 kg/m²     Current Mental Status Evaluation:  Appearance:  Casually dressed,   Behavior:  calm and cooperative   Mood:  Anxious   Affect: constricted   Speech: Normal volume and Normal rate   Thought Process:  Goal directed and coherent   Thought Content:  Paranoid and mistrustful and Delusions of persecution   Perceptual Disturbances: Denies hallucinations and does not appear to be responding to internal stimuli and Visual hallucinations of light on clouds   Risk Potential: No suicidal or homicidal ideation   Orientation:    Patient is alert, awake and oriented x 3   Limited insight, judgement and impulse control           Recent Results (from the past 336 hour(s))   CBC and differential    Collection Time: 01/10/23  8:12 PM   Result Value Ref Range    WBC 7 65 4 31 - 10 16 Thousand/uL    RBC 4 14 3 81 - 5 12 Million/uL    Hemoglobin 11 7 11 5 - 15 4 g/dL Hematocrit 36 7 34 8 - 46 1 %    MCV 89 82 - 98 fL    MCH 28 3 26 8 - 34 3 pg    MCHC 31 9 31 4 - 37 4 g/dL    RDW 14 2 11 6 - 15 1 %    MPV 9 0 8 9 - 12 7 fL    Platelets 547 917 - 723 Thousands/uL    nRBC 0 /100 WBCs    Neutrophils Relative 67 43 - 75 %    Immat GRANS % 1 0 - 2 %    Lymphocytes Relative 20 14 - 44 %    Monocytes Relative 9 4 - 12 %    Eosinophils Relative 3 0 - 6 %    Basophils Relative 0 0 - 1 %    Neutrophils Absolute 5 15 1 85 - 7 62 Thousands/µL    Immature Grans Absolute 0 05 0 00 - 0 20 Thousand/uL    Lymphocytes Absolute 1 52 0 60 - 4 47 Thousands/µL    Monocytes Absolute 0 65 0 17 - 1 22 Thousand/µL    Eosinophils Absolute 0 26 0 00 - 0 61 Thousand/µL    Basophils Absolute 0 02 0 00 - 0 10 Thousands/µL   Comprehensive metabolic panel    Collection Time: 01/10/23  8:12 PM   Result Value Ref Range    Sodium 139 135 - 147 mmol/L    Potassium 4 0 3 5 - 5 3 mmol/L    Chloride 103 96 - 108 mmol/L    CO2 29 21 - 32 mmol/L    ANION GAP 7 5 - 14 mmol/L    BUN 12 5 - 25 mg/dL    Creatinine 0 70 0 60 - 1 20 mg/dL    Glucose 117 (H) 70 - 99 mg/dL    Calcium 9 4 8 4 - 10 2 mg/dL    AST 19 14 - 36 U/L    ALT 19 <35 U/L    Alkaline Phosphatase 77 43 - 122 U/L    Total Protein 7 0 6 4 - 8 4 g/dL    Albumin 4 1 3 5 - 5 0 g/dL    Total Bilirubin 0 47 0 20 - 1 00 mg/dL    eGFR 100 ml/min/1 73sq m   Clozapine    Collection Time: 01/10/23  8:12 PM   Result Value Ref Range    Clozapine Lvl 944 (H) 350 - 650 ng/mL    NorClozapine 713 Not Estab  ng/mL    Total(Cloz+Norcloz) 1657 ng/mL   C-reactive protein    Collection Time: 01/10/23  8:12 PM   Result Value Ref Range    CRP 51 1 (H) <10 0 mg/L   CK (with reflex to MB)    Collection Time: 01/10/23  8:12 PM   Result Value Ref Range    Total CK 79 30 - 135 U/L   Fingerstick Glucose (POCT)    Collection Time: 01/10/23 10:19 PM   Result Value Ref Range    POC Glucose 102 65 - 140 mg/dl   COVID/FLU/RSV    Collection Time: 01/10/23 11:49 PM    Specimen: Nose; Nares Result Value Ref Range    SARS-CoV-2 Positive (A) Negative    INFLUENZA A PCR Negative Negative    INFLUENZA B PCR Negative Negative    RSV PCR Negative Negative   ECG 12 lead    Collection Time: 01/11/23  2:12 PM   Result Value Ref Range    Ventricular Rate 97 BPM    Atrial Rate 97 BPM    NY Interval 156 ms    QRSD Interval 76 ms    QT Interval 362 ms    QTC Interval 459 ms    P Axis 58 degrees    QRS Axis -1 degrees    T Wave Axis 74 degrees   ECG 12 lead    Collection Time: 01/11/23  2:12 PM   Result Value Ref Range    Ventricular Rate 97 BPM    Atrial Rate 97 BPM    NY Interval 156 ms    QRSD Interval 76 ms    QT Interval 362 ms    QTC Interval 459 ms    P Axis 58 degrees    QRS Axis -1 degrees    T Wave Axis 74 degrees   ECG 12 lead    Collection Time: 01/12/23  6:31 AM   Result Value Ref Range    Ventricular Rate 102 BPM    Atrial Rate 102 BPM    NY Interval 150 ms    QRSD Interval 76 ms    QT Interval 368 ms    QTC Interval 479 ms    P Axis 65 degrees    QRS Axis 9 degrees    T Wave Axis 77 degrees     Progress Toward Goals: No significant events in the past 24 hours  Clozapine level collected on 1/10/23 at dose of ( 100mg AM and 400mg PM )  is supra therapeutic at 944( nle range 350-650ng/ml)  Pending clozaril level on   100mg AM and 300mg HS dose  Principal Problem:    Schizoaffective disorder, bipolar type ([address] )  Active Problems:    LYNN (generalized anxiety disorder)    Post-traumatic stress disorder, chronic    Gastroesophageal reflux disease    Discharge planning update: The patient will return to previous living arrangement, plan for 3P in 2 days and will resume ECT    Recommended Treatment: Continue with pharmacotherapy, group therapy, milieu therapy and occupational therapy    The patient will be maintained on the following medications:  Current Facility-Administered Medications   Medication Dose Route Frequency Provider Last Rate   • acetaminophen  650 mg Oral Q6H PRN Adalberto Sparrow RENATANP     • acetaminophen  650 mg Oral Q4H PRN JARED Billingsley     • acetaminophen  975 mg Oral Q6H PRN JARED Billingsley     • albuterol  2 puff Inhalation Q6H PRN JARED Billingsley     • aluminum-magnesium hydroxide-simethicone  30 mL Oral Q4H PRN JARED Billingsley     • benzonatate  100 mg Oral TID PRN JARED Billingsley     • haloperidol lactate  2 5 mg Intramuscular Q6H PRN Max [address], [place]      And   • LORazepam  1 mg Intramuscular Q6H PRN Max [address], [place]      And   • benztropine  0 5 mg Intramuscular Q6H PRN Max [address], [place]     • benztropine  1 mg Intramuscular Q4H PRN Max [address], [place]     • haloperidol lactate  5 mg Intramuscular Q4H PRN Max [address], [place]      And   • LORazepam  2 mg Intramuscular Q4H PRN Max [address], [place]      And   • benztropine  1 mg Intramuscular Q4H PRN Max [address], [place]     • benztropine  1 mg Oral Q4H PRN Max [address], [place]     • calcium carbonate  500 mg Oral Daily PRN JARED Billingsley     • cholecalciferol  1,000 Units Oral Daily [place], [place]     • cloZAPine  100 mg Oral Daily [place], [place]     • cloZAPine  300 mg Oral HS Yony Reynolds MD     • hydrOXYzine HCL  50 mg Oral Q6H PRN Max [address], [place]      Or   • diphenhydrAMINE  50 mg Intramuscular Q6H PRN JARED Billingsley     • docusate sodium  100 mg Oral BID JARED Billingsley     • fenofibrate  145 mg Oral Daily [facility] An[facility]     • fluticasone  1 spray Each Nare BID JARED Billingsley     • glycerin-hypromellose-  1 drop Both Eyes Q3H PRN JARED Billingsley     • guaiFENesin  600 mg Oral Q12H [address]     • haloperidol  0 5 mg Oral Q4H PRN Max 6/day [place] JARED Parham     • haloperidol  1 mg Oral BID PRN JARED Stein     • haloperidol  12 5 mg Oral QAM Ashley Monsalve MD     • haloperidol  15 mg Oral QPM Ashley Monsalve MD     • haloperidol  2 mg Oral Daily PRN RENATA SteinNP     • hydrocortisone   Topical 4x Daily PRN RENATA SteinNP     • hydrocortisone   Topical 4x Daily PRN RENATA SteinNP     • hydrOXYzine HCL  100 mg Oral Q6H PRN Max [address] [facility], [address]      Or   • LORazepam  2 mg Intramuscular Q6H PRN JARED Stein     • hydrOXYzine HCL  25 mg Oral Q6H PRN Max 4/day JARED Stein     • magnesium hydroxide  30 mL Oral Daily PRN JARED Stein     • methocarbamol  500 mg Oral Q6H PRN JARED Stein     • nicotine  1 patch Transdermal Daily Edna Sanches, [address]     • nicotine polacrilex  4 mg Oral Q2H PRN JARED Stein     • ondansetron  4 mg Oral Q6H PRN JARED Stein     • pantoprazole  40 mg Oral Early Morning JARED Robertson     • phenol  1 spray Mouth/Throat Q2H PRN Lexi Goff PA-C     • polyethylene glycol  17 g Oral Daily JARED Robertson     • propranolol  10 mg Oral Q8H PRN JARED Stein     • senna-docusate sodium  1 tablet Oral Daily PRN JARED Stein     • senna-docusate sodium  2 tablet Oral HS JARED Hernandez     • sorbitol  30 mL Oral Q1H PRN JARED Stein     • SUMAtriptan  50 mg Oral Daily PRN JARED Stein     • traZODone  50 mg Oral HS PRN JARED Stein     • venlafaxine  225 mg Oral Daily Boni Miller

## 2023-01-18 NOTE — NURSING NOTE
Patient continues to make multiple phone calls, and paranoid about her missing debit card  Patient is medication compliant, and cooperative with care  Patient currently denies SI, AH, VH, anxiety, and depression  Patient requested and received prune juice for constipation  Will continue to monitor

## 2023-01-18 NOTE — PLAN OF CARE
Problem: Alteration in Thoughts and Perception  Goal: Refrain from acting on delusional thinking/internal stimuli  Description: Interventions:  - Monitor patient closely, per order   - Utilize least restrictive measures   - Set reasonable limits, give positive feedback for acceptable   - Administer medications as ordered and monitor of potential side effects  Outcome: Progressing     Problem: Alteration in Thoughts and Perception  Goal: Verbalize thoughts and feelings  Description: Interventions:  - Promote a nonjudgmental and trusting relationship with the patient through active listening and therapeutic communication  - Assess patient's level of functioning, behavior and potential for risk  - Engage patient in 1 on 1 interactions  - Encourage patient to express fears, feelings, frustrations, and discuss symptoms    - [place] patient to reality, help patient recognize reality-based thinking   - Administer medications as ordered and assess for potential side effects  - Provide the patient education related to the signs and symptoms of the illness and desired effects of prescribed medications  Outcome: Progressing     Problem: Alteration in Thoughts and Perception  Goal: Agree to be compliant with medication regime, as prescribed and report medication side effects  Description: Interventions:  - Offer appropriate PRN medication and supervise ingestion; conduct AIMS, as needed   Outcome: Progressing     Problem: Alteration in Thoughts and Perception  Goal: Complete daily ADLs, including personal hygiene independently, as able  Description: Interventions:  - Observe, teach, and assist patient with ADLS  - Monitor and promote a balance of rest/activity, with adequate nutrition and elimination   Outcome: Progressing

## 2023-01-18 NOTE — CASE MANAGEMENT
Cm spoke with Enrique Monroe at [facility] tel# [phone], update provided regarding pt's status  CM informed Enrique Monroe of pt now agreeing to cont with ECT and plan for pt to transfer back to  unit on 1/21  Enrique Monroe reports she will provide update to [facility] home

## 2023-01-18 NOTE — NURSING NOTE
Pt was observed laying in bed throughout the shift  Pt does seek out staff to make needs known  Pt is frequently requesting the phone and is social with roommate  Patient is pleasant, calm and cooperative  Pt rated anxiety 0/4 while having 0/10 depression  Pt denies any AH, VH, SI and HI  Patient denies pain at this time  No agitation or aggression noted  Patient appetite okay at dinner as he ate 50%  Pt compliant with medications as she tolerated them well  Q 7 minute checks maintained

## 2023-01-19 RX ADMIN — GUAIFENESIN 600 MG: 600 TABLET, EXTENDED RELEASE ORAL at 21:50

## 2023-01-19 RX ADMIN — DOCUSATE SODIUM 100 MG: 100 CAPSULE, LIQUID FILLED ORAL at 17:25

## 2023-01-19 RX ADMIN — FLUTICASONE PROPIONATE 1 SPRAY: 50 SPRAY, METERED NASAL at 17:25

## 2023-01-19 RX ADMIN — FENOFIBRATE 145 MG: 145 TABLET, COATED ORAL at 08:14

## 2023-01-19 RX ADMIN — GUAIFENESIN 600 MG: 600 TABLET, EXTENDED RELEASE ORAL at 08:14

## 2023-01-19 RX ADMIN — HYDROXYZINE HYDROCHLORIDE 50 MG: 50 TABLET, FILM COATED ORAL at 14:08

## 2023-01-19 RX ADMIN — HALOPERIDOL 15 MG: 5 TABLET ORAL at 17:25

## 2023-01-19 RX ADMIN — CLOZAPINE 300 MG: 100 TABLET ORAL at 21:51

## 2023-01-19 RX ADMIN — SENNOSIDES AND DOCUSATE SODIUM 2 TABLET: 8.6; 5 TABLET ORAL at 21:50

## 2023-01-19 RX ADMIN — HALOPERIDOL 12.5 MG: 1 TABLET ORAL at 08:13

## 2023-01-19 RX ADMIN — POLYETHYLENE GLYCOL 3350 17 G: 17 POWDER, FOR SOLUTION ORAL at 08:17

## 2023-01-19 RX ADMIN — CALCIUM CARBONATE (ANTACID) CHEW TAB 500 MG 500 MG: 500 CHEW TAB at 17:24

## 2023-01-19 RX ADMIN — DOCUSATE SODIUM 100 MG: 100 CAPSULE, LIQUID FILLED ORAL at 08:14

## 2023-01-19 RX ADMIN — NICOTINE 1 PATCH: 21 PATCH, EXTENDED RELEASE TRANSDERMAL at 08:17

## 2023-01-19 RX ADMIN — CLOZAPINE 100 MG: 100 TABLET ORAL at 08:14

## 2023-01-19 RX ADMIN — FLUTICASONE PROPIONATE 1 SPRAY: 50 SPRAY, METERED NASAL at 08:18

## 2023-01-19 RX ADMIN — VENLAFAXINE HYDROCHLORIDE 225 MG: 37.5 CAPSULE, EXTENDED RELEASE ORAL at 08:14

## 2023-01-19 RX ADMIN — CHOLECALCIFEROL TAB 25 MCG (1000 UNIT) 1000 UNITS: 25 TAB at 08:14

## 2023-01-19 NOTE — PROGRESS NOTES
Progress Note - Behavioral Health   Marifer Walker 47 y o  female MRN: [id number]  Unit/Bed#: [facility] 163-58 Encounter: [id number]    The patient was seen for continuing care and reviewed with treatment team   Patient remains paranoid and delusional  She reports nightmares and worries that some people from her past may be out to get her  But today she reports doing better, no AVH  She has been complaint with her medications daily, pt reports ambivalence about  restarting ECT but seems to respond to education about the need to continue  No ROLA  She reports sleeping and eating well  No constipation, negative ROS  No EPS noted     /69 (BP Location: Right arm)   Pulse 95   Temp 97 9 °F (36 6 °C) (Temporal)   Resp 18   Wt 71 3 kg (157 lb 3 2 oz)   LMP  (LMP Unknown)   SpO2 95%   BMI 29 70 kg/m²     Current Mental Status Evaluation:  Appearance:  Casually dressed,   Behavior:  calm and cooperative   Mood:  Anxious   Affect: constricted, depressed  Speech: Normal volume and Normal rate   Thought Process:  Goal directed and coherent   Thought Content:  Paranoid and mistrustful and Delusions of persecution   Perceptual Disturbances: Denies hallucinations and does not appear to be responding to internal stimuli and Visual hallucinations of light on clouds   Risk Potential: No suicidal or homicidal ideation   Orientation:    Patient is alert, awake and oriented x 3   Limited insight, judgement and impulse control       Recent Results (from the past 336 hour(s))   CBC and differential    Collection Time: 01/10/23  8:12 PM   Result Value Ref Range    WBC 7 65 4 31 - 10 16 Thousand/uL    RBC 4 14 3 81 - 5 12 Million/uL    Hemoglobin 11 7 11 5 - 15 4 g/dL    Hematocrit 36 7 34 8 - 46 1 %    MCV 89 82 - 98 fL    MCH 28 3 26 8 - 34 3 pg    MCHC 31 9 31 4 - 37 4 g/dL    RDW 14 2 11 6 - 15 1 %    MPV 9 0 8 9 - 12 7 fL    Platelets 864 276 - 786 Thousands/uL    nRBC 0 /100 WBCs    Neutrophils Relative 67 43 - 75 % Immat GRANS % 1 0 - 2 %    Lymphocytes Relative 20 14 - 44 %    Monocytes Relative 9 4 - 12 %    Eosinophils Relative 3 0 - 6 %    Basophils Relative 0 0 - 1 %    Neutrophils Absolute 5 15 1 85 - 7 62 Thousands/µL    Immature Grans Absolute 0 05 0 00 - 0 20 Thousand/uL    Lymphocytes Absolute 1 52 0 60 - 4 47 Thousands/µL    Monocytes Absolute 0 65 0 17 - 1 22 Thousand/µL    Eosinophils Absolute 0 26 0 00 - 0 61 Thousand/µL    Basophils Absolute 0 02 0 00 - 0 10 Thousands/µL   Comprehensive metabolic panel    Collection Time: 01/10/23  8:12 PM   Result Value Ref Range    Sodium 139 135 - 147 mmol/L    Potassium 4 0 3 5 - 5 3 mmol/L    Chloride 103 96 - 108 mmol/L    CO2 29 21 - 32 mmol/L    ANION GAP 7 5 - 14 mmol/L    BUN 12 5 - 25 mg/dL    Creatinine 0 70 0 60 - 1 20 mg/dL    Glucose 117 (H) 70 - 99 mg/dL    Calcium 9 4 8 4 - 10 2 mg/dL    AST 19 14 - 36 U/L    ALT 19 <35 U/L    Alkaline Phosphatase 77 43 - 122 U/L    Total Protein 7 0 6 4 - 8 4 g/dL    Albumin 4 1 3 5 - 5 0 g/dL    Total Bilirubin 0 47 0 20 - 1 00 mg/dL    eGFR 100 ml/min/1 73sq m   Clozapine    Collection Time: 01/10/23  8:12 PM   Result Value Ref Range    Clozapine Lvl 944 (H) 350 - 650 ng/mL    NorClozapine 713 Not Estab  ng/mL    Total(Cloz+Norcloz) 1657 ng/mL   C-reactive protein    Collection Time: 01/10/23  8:12 PM   Result Value Ref Range    CRP 51 1 (H) <10 0 mg/L   CK (with reflex to MB)    Collection Time: 01/10/23  8:12 PM   Result Value Ref Range    Total CK 79 30 - 135 U/L   Fingerstick Glucose (POCT)    Collection Time: 01/10/23 10:19 PM   Result Value Ref Range    POC Glucose 102 65 - 140 mg/dl   COVID/FLU/RSV    Collection Time: 01/10/23 11:49 PM    Specimen: Nose; Nares   Result Value Ref Range    SARS-CoV-2 Positive (A) Negative    INFLUENZA A PCR Negative Negative    INFLUENZA B PCR Negative Negative    RSV PCR Negative Negative   ECG 12 lead    Collection Time: 01/11/23  2:12 PM   Result Value Ref Range    Ventricular Rate 97 BPM    Atrial Rate 97 BPM    UT Interval 156 ms    QRSD Interval 76 ms    QT Interval 362 ms    QTC Interval 459 ms    P Axis 58 degrees    QRS Axis -1 degrees    T Wave Axis 74 degrees   ECG 12 lead    Collection Time: 01/11/23  2:12 PM   Result Value Ref Range    Ventricular Rate 97 BPM    Atrial Rate 97 BPM    UT Interval 156 ms    QRSD Interval 76 ms    QT Interval 362 ms    QTC Interval 459 ms    P Axis 58 degrees    QRS Axis -1 degrees    T Wave Axis 74 degrees   ECG 12 lead    Collection Time: 01/12/23  6:31 AM   Result Value Ref Range    Ventricular Rate 102 BPM    Atrial Rate 102 BPM    UT Interval 150 ms    QRSD Interval 76 ms    QT Interval 368 ms    QTC Interval 479 ms    P Axis 65 degrees    QRS Axis 9 degrees    T Wave Axis 77 degrees     Progress Toward Goals: No significant events in the past 24 hours  Clozapine level collected on 1/10/23 at dose of ( 100mg AM and 400mg PM )  is supra therapeutic at 944( nle range 350-650ng/ml)  Clozaril level on   100mg AM and 300mg HS dose is still pending   continue haldol 12 5mg Am and 15mg HS  Principal Problem:    Schizoaffective disorder, bipolar type ([address] )  Active Problems:    LYNN (generalized anxiety disorder)    Post-traumatic stress disorder, chronic    Gastroesophageal reflux disease    Discharge planning update: The patient will return to previous living arrangement, plan to return to  and will resume ECT    Recommended Treatment: Continue with pharmacotherapy, group therapy, milieu therapy and occupational therapy    The patient will be maintained on the following medications:  Current Facility-Administered Medications   Medication Dose Route Frequency Provider Last Rate   • acetaminophen  650 mg Oral Q6H PRN [place] JARED Mueller     • acetaminophen  650 mg Oral Q4H PRN [place] Landrum     • acetaminophen  975 mg Oral Q6H PRN [place] JARED Mueller     • albuterol  2 puff Inhalation Q6H PRN [place] JARED Mueller • aluminum-magnesium hydroxide-simethicone  30 mL Oral Q4H PRN [place] JARED Mueller     • benzonatate  100 mg Oral TID PRN [place] JARED Mueller     • haloperidol lactate  2 5 mg Intramuscular Q6H PRN Max [address] [facility], [address]      And   • LORazepam  1 mg Intramuscular Q6H PRN Max [address] [facility], [address]      And   • benztropine  0 5 mg Intramuscular Q6H PRN Max [address] [facility], [address]     • benztropine  1 mg Intramuscular Q4H PRN Max [address] [facility], [address]     • haloperidol lactate  5 mg Intramuscular Q4H PRN Max [address] [facility], [address]      And   • LORazepam  2 mg Intramuscular Q4H PRN Max [address] [facility], [address]      And   • benztropine  1 mg Intramuscular Q4H PRN Max [address] [facility], [address]     • benztropine  1 mg Oral Q4H PRN Max [address] [facility], [address]     • calcium carbonate  500 mg Oral Daily PRN [place] JARED Mueller     • cholecalciferol  1,000 Units Oral Daily [place] [facility], [address] [facility] [address] [facility] [address]     • cloZAPine  100 mg Oral Daily [place] [facility], [address] [facility]     • cloZAPine  300 mg Oral HS Raquel Foster MD     • hydrOXYzine HCL  50 mg Oral Q6H PRN Max [address] [facility], [address]      Or   • diphenhydrAMINE  50 mg Intramuscular Q6H PRN [place] JARED Mueller     • docusate sodium  100 mg Oral BID JARED Robertson     • fenofibrate  145 mg Oral Daily JARED Robertson     • fluticasone  1 spray Each Nare BID [place] JARED Mueller     • glycerin-hypromellose-  1 drop Both Eyes Q3H PRN [place] JARED Mueller     • guaiFENesin  600 mg Oral Q12H [address], [address]     • haloperidol  0 5 mg Oral Q4H PRN Max 6/day [facility], [address] [facility] [address]     • haloperidol  1 mg Oral BID PRN [place] JARED Mueller     • haloperidol  12 5 mg Oral QA Raquel Foster MD     • haloperidol  15 mg Oral QPM Raquel Foster MD     • haloperidol  2 mg Oral Daily PRN [address] [facility], CRNP     • hydrocortisone   Topical 4x Daily PRN [address] [facility], CRNP     • hydrocortisone   Topical 4x Daily PRN [address] [facility], CRNP     • hydrOXYzine HCL  100 mg Oral Q6H PRN Max [address] [facility], [address]      Or   • LORazepam  2 mg Intramuscular Q6H PRN [address] [facility], CRNP     • hydrOXYzine HCL  25 mg Oral Q6H PRN Max [address] [facility], [address]     • magnesium hydroxide  30 mL Oral Daily PRN [address] [facility], CRNP     • methocarbamol  500 mg Oral Q6H PRN [address] [facility], CRNP     • nicotine  1 patch Transdermal Daily [address] [facility], [address]     • nicotine polacrilex  4 mg Oral Q2H PRN [address] [facility], CRNP     • ondansetron  4 mg Oral Q6H PRN [address] [facility], CRNP     • pantoprazole  40 mg Oral Early Morning JARED Robertson     • phenol  1 spray Mouth/Throat Q2H PRN Justin Randall PA-C     • polyethylene glycol  17 g Oral Daily JARED Robertson     • propranolol  10 mg Oral Q8H PRN [address] [facility], CRNP     • senna-docusate sodium  1 tablet Oral Daily PRN [address] [facility], CRNP     • senna-docusate sodium  2 tablet Oral HS JARED Lenz     • sorbitol  30 mL Oral Q1H PRN [address] [facility], CRNP     • SUMAtriptan  50 mg Oral Daily PRN [address] [facility], CRNP     • traZODone  50 mg Oral HS PRN [address] [facility], CRNP     • venlafaxine  225 mg Oral Daily Boni Hawk

## 2023-01-19 NOTE — PLAN OF CARE
Problem: Alteration in Thoughts and Perception  Goal: Verbalize thoughts and feelings  Description: Interventions:  - Promote a nonjudgmental and trusting relationship with the patient through active listening and therapeutic communication  - Assess patient's level of functioning, behavior and potential for risk  - Engage patient in 1 on 1 interactions  - Encourage patient to express fears, feelings, frustrations, and discuss symptoms    - [place] patient to reality, help patient recognize reality-based thinking   - Administer medications as ordered and assess for potential side effects  - Provide the patient education related to the signs and symptoms of the illness and desired effects of prescribed medications  Outcome: Progressing     Problem: Alteration in Thoughts and Perception  Goal: Refrain from acting on delusional thinking/internal stimuli  Description: Interventions:  - Monitor patient closely, per order   - Utilize least restrictive measures   - Set reasonable limits, give positive feedback for acceptable   - Administer medications as ordered and monitor of potential side effects  Outcome: Progressing     Problem: Alteration in Thoughts and Perception  Goal: Agree to be compliant with medication regime, as prescribed and report medication side effects  Description: Interventions:  - Offer appropriate PRN medication and supervise ingestion; conduct AIMS, as needed   Outcome: Progressing

## 2023-01-19 NOTE — CASE MANAGEMENT
01/19/23 0910   Team Meeting   Meeting Type Daily Rounds   Initial Conference Date 01/19/23   Team Members Present   Team Members Present Physician;Nurse;;; Occupational Therapist   Physician Team Member Natacha [id number] Team Member AMBER [facility] Management Team Member Abigail Schuster   Social Work Team Member Renay   OT Team Member Ronen   Patient/Family Present   Patient Present No   Patient's Family Present No     Refused Protonix this AM, paranoid, slept, social, med compliant, pleasant

## 2023-01-19 NOTE — NURSING NOTE
Patient is irritable this AM demanding to talk to provider regarding her d/c and reported anxiety 2/4  PRN atarax given 1408  Patient is visible in the hallway making phone calls  Patient is medication compliant  Will continue to monitor

## 2023-01-20 RX ADMIN — DOCUSATE SODIUM 100 MG: 100 CAPSULE, LIQUID FILLED ORAL at 17:06

## 2023-01-20 RX ADMIN — HALOPERIDOL 12.5 MG: 1 TABLET ORAL at 08:18

## 2023-01-20 RX ADMIN — SENNOSIDES AND DOCUSATE SODIUM 2 TABLET: 8.6; 5 TABLET ORAL at 21:16

## 2023-01-20 RX ADMIN — FLUTICASONE PROPIONATE 1 SPRAY: 50 SPRAY, METERED NASAL at 08:19

## 2023-01-20 RX ADMIN — CALCIUM CARBONATE (ANTACID) CHEW TAB 500 MG 500 MG: 500 CHEW TAB at 10:40

## 2023-01-20 RX ADMIN — VENLAFAXINE HYDROCHLORIDE 225 MG: 37.5 CAPSULE, EXTENDED RELEASE ORAL at 08:18

## 2023-01-20 RX ADMIN — GUAIFENESIN 600 MG: 600 TABLET, EXTENDED RELEASE ORAL at 21:17

## 2023-01-20 RX ADMIN — GUAIFENESIN 600 MG: 600 TABLET, EXTENDED RELEASE ORAL at 08:18

## 2023-01-20 RX ADMIN — FENOFIBRATE 145 MG: 145 TABLET, COATED ORAL at 08:18

## 2023-01-20 RX ADMIN — CLOZAPINE 100 MG: 100 TABLET ORAL at 08:18

## 2023-01-20 RX ADMIN — HYDROXYZINE HYDROCHLORIDE 50 MG: 50 TABLET, FILM COATED ORAL at 10:40

## 2023-01-20 RX ADMIN — CHOLECALCIFEROL TAB 25 MCG (1000 UNIT) 1000 UNITS: 25 TAB at 08:18

## 2023-01-20 RX ADMIN — HALOPERIDOL 15 MG: 5 TABLET ORAL at 17:06

## 2023-01-20 RX ADMIN — POLYETHYLENE GLYCOL 3350 17 G: 17 POWDER, FOR SOLUTION ORAL at 08:18

## 2023-01-20 RX ADMIN — NICOTINE 1 PATCH: 21 PATCH, EXTENDED RELEASE TRANSDERMAL at 08:19

## 2023-01-20 RX ADMIN — HALOPERIDOL 2 MG: 1 TABLET ORAL at 10:41

## 2023-01-20 RX ADMIN — CLOZAPINE 300 MG: 100 TABLET ORAL at 21:17

## 2023-01-20 RX ADMIN — DOCUSATE SODIUM 100 MG: 100 CAPSULE, LIQUID FILLED ORAL at 08:18

## 2023-01-20 RX ADMIN — FLUTICASONE PROPIONATE 1 SPRAY: 50 SPRAY, METERED NASAL at 17:07

## 2023-01-20 NOTE — CASE MANAGEMENT
01/20/23 0858   Team Meeting   Meeting Type Daily Rounds   Initial Conference Date 01/20/23   Team Members Present   Team Members Present Physician;Nurse;;; Occupational Therapist   Physician Team Member Dr Carlos Boyce Management Team Member [address] [place] [address] Work Team Member Renay   OT Team Member Stacy Calix   Patient/Family Present   Patient Present No   Patient's Family Present No     2/4 anxiety, irritable edge, social, med compliant, requested PRN Atarax, slept, Clozaril level pending

## 2023-01-20 NOTE — PLAN OF CARE
Problem: Alteration in Thoughts and Perception  Goal: Treatment Goal: Gain control of psychotic behaviors/thinking, reduce/eliminate presenting symptoms and demonstrate improved reality functioning upon discharge  Outcome: Progressing  Goal: Verbalize thoughts and feelings  Description: Interventions:  - Promote a nonjudgmental and trusting relationship with the patient through active listening and therapeutic communication  - Assess patient's level of functioning, behavior and potential for risk  - Engage patient in 1 on 1 interactions  - Encourage patient to express fears, feelings, frustrations, and discuss symptoms    - [place] patient to reality, help patient recognize reality-based thinking   - Administer medications as ordered and assess for potential side effects  - Provide the patient education related to the signs and symptoms of the illness and desired effects of prescribed medications  Outcome: Progressing  Goal: Refrain from acting on delusional thinking/internal stimuli  Description: Interventions:  - Monitor patient closely, per order   - Utilize least restrictive measures   - Set reasonable limits, give positive feedback for acceptable   - Administer medications as ordered and monitor of potential side effects  Outcome: Progressing  Goal: Agree to be compliant with medication regime, as prescribed and report medication side effects  Description: Interventions:  - Offer appropriate PRN medication and supervise ingestion; conduct AIMS, as needed   Outcome: Progressing  Goal: Recognize dysfunctional thoughts, communicate reality-based thoughts at the time of discharge  Description: Interventions:  - Provide medication and psycho-education to assist patient in compliance and developing insight into his/her illness   Outcome: Progressing  Goal: Complete daily ADLs, including personal hygiene independently, as able  Description: Interventions:  - Observe, teach, and assist patient with ADLS  - Monitor and promote a balance of rest/activity, with adequate nutrition and elimination   Outcome: Progressing     Problem: Ineffective Coping  Goal: Identifies ineffective coping skills  Outcome: Progressing  Goal: Identifies healthy coping skills  Outcome: Progressing  Goal: Demonstrates healthy coping skills  Outcome: Progressing  Goal: Patient/Family participate in treatment and DC plans  Description: Interventions:  - Provide therapeutic environment  Outcome: Progressing  Goal: Patient/Family verbalizes awareness of resources  Outcome: Progressing  Goal: Understands least restrictive measures  Description: Interventions:  - Utilize least restrictive behavior  Outcome: Progressing  Goal: Free from restraint events  Description: - Utilize least restrictive measures   - Provide behavioral interventions   - Redirect inappropriate behaviors   Outcome: Progressing     Problem: Risk for Self Injury/Neglect  Goal: Treatment Goal: Remain safe during length of stay, learn and adopt new coping skills, and be free of self-injurious ideation, impulses and acts at the time of discharge  Outcome: Progressing  Goal: Refrain from harming self  Description: Interventions:  - Monitor patient closely, per order  - Develop a trusting relationship  - Supervise medication ingestion, monitor effects and side effects   Outcome: Progressing  Goal: Recognize maladaptive responses and adopt new coping mechanisms  Outcome: Progressing     Problem: Depression  Goal: Treatment Goal: Demonstrate behavioral control of depressive symptoms, verbalize feelings of improved mood/affect, and adopt new coping skills prior to discharge  Outcome: Progressing  Goal: Refrain from isolation  Description: Interventions:  - Develop a trusting relationship   - Encourage socialization   Outcome: Progressing  Goal: Refrain from self-neglect  Outcome: Progressing     Problem: Anxiety  Goal: Anxiety is at manageable level  Description: Interventions:  - Assess and monitor patient's anxiety level  - Monitor for signs and symptoms (heart palpitations, chest pain, shortness of breath, headaches, nausea, feeling jumpy, restlessness, irritable, apprehensive)  - Collaborate with interdisciplinary team and initiate plan and interventions as ordered    - [place] patient to unit/surroundings  - Explain treatment plan  - Encourage participation in care  - Encourage verbalization of concerns/fears  - Identify coping mechanisms  - Assist in developing anxiety-reducing skills  - Administer/offer alternative therapies  - Limit or eliminate stimulants  Outcome: Progressing     Problem: Risk for Violence/Aggression Toward Others  Goal: Treatment Goal: Refrain from acts of violence/aggression during length of stay, and demonstrate improved impulse control at the time of discharge  Outcome: Progressing  Goal: Refrain from destructive acts on the environment or property  Outcome: Progressing  Goal: Control angry outbursts  Description: Interventions:  - Monitor patient closely, per order  - Ensure early verbal de-escalation  - Monitor prn medication needs  - Set reasonable/therapeutic limits, outline behavioral expectations, and consequences   - Provide a non-threatening milieu, utilizing the least restrictive interventions   Outcome: Progressing  Goal: Identify appropriate positive anger management techniques  Description: Interventions:  - Offer anger management and coping skills groups   - Staff will provide positive feedback for appropriate anger control  Outcome: Progressing

## 2023-01-20 NOTE — PROGRESS NOTES
Progress Note - Behavioral Health   Guerda Jasmine 47 y o  female MRN: [id number]  Unit/Bed#Samir Talbert 333-06 Encounter: [id number]    The patient was seen for continuing care and reviewed with treatment team   Patient remains paranoid and delusional, irritable today  Wants to be able to discharge home and not return to [facility]  " tell the doctor downstairs I will no longer resume ECT"  She denies  AH but states few hours ago she heard multiple voices trying to comment on her meal  These are unfamiliar voices, not command in nature     No ROLA  She reports sleeping and eating well  No constipation , she had a bowel movement today  Reports some mild abdominal pain, no tenderness on stomach palpation, no nausea or vomiting  No EPS noted  No COVID symptoms reported     /92 (BP Location: Left arm)   Pulse 99   Temp (!) 97 3 °F (36 3 °C) (Temporal)   Resp 18   Wt 71 3 kg (157 lb 3 2 oz)   LMP  (LMP Unknown)   SpO2 90%   BMI 29 70 kg/m²     Current Mental Status Evaluation:  Appearance:  Casually dressed,   Behavior:  calm and cooperative   Mood:  Anxious and Irritable   Affect: constricted, depressed  Speech: Normal volume and Normal rate   Thought Process:  Goal directed and coherent   Thought Content:  Paranoid and mistrustful and Delusions of persecution   Perceptual Disturbances: Denies hallucinations and does not appear to be responding to internal stimuli and Visual hallucinations of light on clouds   Risk Potential: No suicidal or homicidal ideation   Orientation:    Patient is alert, awake and oriented x 3   Limited insight, judgement and impulse control       Recent Results (from the past 336 hour(s))   CBC and differential    Collection Time: 01/10/23  8:12 PM   Result Value Ref Range    WBC 7 65 4 31 - 10 16 Thousand/uL    RBC 4 14 3 81 - 5 12 Million/uL    Hemoglobin 11 7 11 5 - 15 4 g/dL    Hematocrit 36 7 34 8 - 46 1 %    MCV 89 82 - 98 fL    MCH 28 3 26 8 - 34 3 pg    MCHC 31 9 31 4 - 37 4 g/dL RDW 14 2 11 6 - 15 1 %    MPV 9 0 8 9 - 12 7 fL    Platelets 111 873 - 386 Thousands/uL    nRBC 0 /100 WBCs    Neutrophils Relative 67 43 - 75 %    Immat GRANS % 1 0 - 2 %    Lymphocytes Relative 20 14 - 44 %    Monocytes Relative 9 4 - 12 %    Eosinophils Relative 3 0 - 6 %    Basophils Relative 0 0 - 1 %    Neutrophils Absolute 5 15 1 85 - 7 62 Thousands/µL    Immature Grans Absolute 0 05 0 00 - 0 20 Thousand/uL    Lymphocytes Absolute 1 52 0 60 - 4 47 Thousands/µL    Monocytes Absolute 0 65 0 17 - 1 22 Thousand/µL    Eosinophils Absolute 0 26 0 00 - 0 61 Thousand/µL    Basophils Absolute 0 02 0 00 - 0 10 Thousands/µL   Comprehensive metabolic panel    Collection Time: 01/10/23  8:12 PM   Result Value Ref Range    Sodium 139 135 - 147 mmol/L    Potassium 4 0 3 5 - 5 3 mmol/L    Chloride 103 96 - 108 mmol/L    CO2 29 21 - 32 mmol/L    ANION GAP 7 5 - 14 mmol/L    BUN 12 5 - 25 mg/dL    Creatinine 0 70 0 60 - 1 20 mg/dL    Glucose 117 (H) 70 - 99 mg/dL    Calcium 9 4 8 4 - 10 2 mg/dL    AST 19 14 - 36 U/L    ALT 19 <35 U/L    Alkaline Phosphatase 77 43 - 122 U/L    Total Protein 7 0 6 4 - 8 4 g/dL    Albumin 4 1 3 5 - 5 0 g/dL    Total Bilirubin 0 47 0 20 - 1 00 mg/dL    eGFR 100 ml/min/1 73sq m   Clozapine    Collection Time: 01/10/23  8:12 PM   Result Value Ref Range    Clozapine Lvl 944 (H) 350 - 650 ng/mL    NorClozapine 713 Not Estab  ng/mL    Total(Cloz+Norcloz) 1657 ng/mL   C-reactive protein    Collection Time: 01/10/23  8:12 PM   Result Value Ref Range    CRP 51 1 (H) <10 0 mg/L   CK (with reflex to MB)    Collection Time: 01/10/23  8:12 PM   Result Value Ref Range    Total CK 79 30 - 135 U/L   Fingerstick Glucose (POCT)    Collection Time: 01/10/23 10:19 PM   Result Value Ref Range    POC Glucose 102 65 - 140 mg/dl   COVID/FLU/RSV    Collection Time: 01/10/23 11:49 PM    Specimen: Nose; Nares   Result Value Ref Range    SARS-CoV-2 Positive (A) Negative    INFLUENZA A PCR Negative Negative    INFLUENZA B PCR Negative Negative    RSV PCR Negative Negative   ECG 12 lead    Collection Time: 01/11/23  2:12 PM   Result Value Ref Range    Ventricular Rate 97 BPM    Atrial Rate 97 BPM    ME Interval 156 ms    QRSD Interval 76 ms    QT Interval 362 ms    QTC Interval 459 ms    P Axis 58 degrees    QRS Axis -1 degrees    T Wave Axis 74 degrees   ECG 12 lead    Collection Time: 01/11/23  2:12 PM   Result Value Ref Range    Ventricular Rate 97 BPM    Atrial Rate 97 BPM    ME Interval 156 ms    QRSD Interval 76 ms    QT Interval 362 ms    QTC Interval 459 ms    P Axis 58 degrees    QRS Axis -1 degrees    T Wave Axis 74 degrees   ECG 12 lead    Collection Time: 01/12/23  6:31 AM   Result Value Ref Range    Ventricular Rate 102 BPM    Atrial Rate 102 BPM    ME Interval 150 ms    QRSD Interval 76 ms    QT Interval 368 ms    QTC Interval 479 ms    P Axis 65 degrees    QRS Axis 9 degrees    T Wave Axis 77 degrees     Progress Toward Goals: No significant events in the past 24 hours  Clozapine level collected on 1/10/23 at dose of ( 100mg AM and 400mg PM )  is supra therapeutic at 944( nle range 350-650ng/ml)  Clozaril level on   100mg AM and 300mg HS dose is still pending   Continue haldol 12 5mg Am and 15mg HS  - plan to increase as tolerated, no EPS  Principal Problem:    Schizoaffective disorder, bipolar type (HCC)  Active Problems:    LYNN (generalized anxiety disorder)    Post-traumatic stress disorder, chronic    Gastroesophageal reflux disease    Discharge planning update: The patient will return to previous living arrangement, plan to return to  and will resume ECT    Recommended Treatment: Continue with pharmacotherapy, group therapy, milieu therapy and occupational therapy    The patient will be maintained on the following medications:  Current Facility-Administered Medications   Medication Dose Route Frequency Provider Last Rate   • acetaminophen  650 mg Oral Q6H PRN JARED Sotomayor     • acetaminophen 650 mg Oral Q4H PRN JARED Vázquez     • acetaminophen  975 mg Oral Q6H PRN JARED Vázquez     • albuterol  2 puff Inhalation Q6H PRN JARED Vázquez     • aluminum-magnesium hydroxide-simethicone  30 mL Oral Q4H PRN JARED Vázquez     • benzonatate  100 mg Oral TID PRN JARED Vázquez     • haloperidol lactate  2 5 mg Intramuscular Q6H PRN Max [address] [facility], [address]      And   • LORazepam  1 mg Intramuscular Q6H PRN Max [address] [facility], [address]      And   • benztropine  0 5 mg Intramuscular Q6H PRN Max [address] [facility], [address]     • benztropine  1 mg Intramuscular Q4H PRN Max [address] [facility], [address]     • haloperidol lactate  5 mg Intramuscular Q4H PRN Max [address] [facility], [address]      And   • LORazepam  2 mg Intramuscular Q4H PRN Max [address] [facility], [address]      And   • benztropine  1 mg Intramuscular Q4H PRN Max [address] [facility], [address]     • benztropine  1 mg Oral Q4H PRN Max [address] [facility], [address]     • calcium carbonate  500 mg Oral Daily PRN JARED Vázquez     • cholecalciferol  1,000 Units Oral Daily Francisca Teran, [address]     • cloZAPine  100 mg Oral Daily Francisca Teran, [address]     • cloZAPine  300 mg Oral HS Nory Cerna MD     • hydrOXYzine HCL  50 mg Oral Q6H PRN Max [address] [facility], [address]      Or   • diphenhydrAMINE  50 mg Intramuscular Q6H PRN JARED Vázquez     • docusate sodium  100 mg Oral BID JARED Vázquez     • fenofibrate  145 mg Oral Daily JARED Robertson     • fluticasone  1 spray Each Nare BID JARED Vázquez     • glycerin-hypromellose-  1 drop Both Eyes Q3H PRN JARED Vázquez     • guaiFENesin  600 mg Oral Q12H [address]     • haloperidol  0 5 mg Oral Q4H PRN Max [address]     • haloperidol  1 mg Oral BID PRN Akash Pilsner, CRNP     • haloperidol  12 5 mg Oral QAM Miri Glover MD     • haloperidol  15 mg Oral QPM Miri Glover MD     • haloperidol  2 mg Oral Daily PRN Akash Pilsner, CRNP     • hydrocortisone   Topical 4x Daily PRN Akash Pilsner, CRNP     • hydrocortisone   Topical 4x Daily PRN Akash Pilsner, CRNP     • hydrOXYzine HCL  100 mg Oral Q6H PRN Max [address] [facility], [address]      Or   • LORazepam  2 mg Intramuscular Q6H PRN Akash Pilsner, CRNP     • hydrOXYzine HCL  25 mg Oral Q6H PRN Max [address] [facility], [address]     • magnesium hydroxide  30 mL Oral Daily PRN Akash Pilsner, CRNP     • methocarbamol  500 mg Oral Q6H PRN Akash Pilsner, CRNP     • nicotine  1 patch Transdermal Daily Akash Pilsner, [address]     • nicotine polacrilex  4 mg Oral Q2H PRN Akash Pilsner, CRNP     • ondansetron  4 mg Oral Q6H PRN Akash Pilsner, CRNP     • pantoprazole  40 mg Oral Early Morning Boni Pfeiffer, CRNP     • phenol  1 spray Mouth/Throat Q2H PRN Milind Joyce PA-C     • polyethylene glycol  17 g Oral Daily JARED Robertson     • propranolol  10 mg Oral Q8H PRN Akash Pilsner, CRNP     • senna-docusate sodium  1 tablet Oral Daily PRN Akash Pilsner, CRNP     • senna-docusate sodium  2 tablet Oral HS [facility], CRNP     • sorbitol  30 mL Oral Q1H PRN Akash Pilsner, CRNP     • SUMAtriptan  50 mg Oral Daily PRN Akash Pilsner, CRNP     • traZODone  50 mg Oral HS PRN Akash Pilsner, CRNP     • venlafaxine  225 mg Oral Daily Akash Pilsner, [address]                  Progress Note - Navjot Lamb 47 y o  female MRN: [id number]  Unit/Bed#Rob Montoya [id number] Encounter: [id number]    The patient was seen for continuing care and reviewed with treatment team   Patient remains paranoid and delusional  She reports nightmares and worries that some people from her past may be out to get her  But today she reports doing better, no AVH  She has been complaint with her medications daily, pt reports ambivalence about  restarting ECT but seems to respond to education about the need to continue  No ROLA  She reports sleeping and eating well  No constipation, negative ROS  No EPS noted     /92 (BP Location: Left arm)   Pulse 99   Temp (!) 97 3 °F (36 3 °C) (Temporal)   Resp 18   Wt 71 3 kg (157 lb 3 2 oz)   LMP  (LMP Unknown)   SpO2 90%   BMI 29 70 kg/m²     Current Mental Status Evaluation:  Appearance:  Casually dressed,   Behavior:  calm and cooperative   Mood:  Anxious   Affect: constricted, depressed  Speech: Normal volume and Normal rate   Thought Process:  Goal directed and coherent   Thought Content:  Paranoid and mistrustful and Delusions of persecution   Perceptual Disturbances: Denies hallucinations and does not appear to be responding to internal stimuli and Visual hallucinations of light on clouds   Risk Potential: No suicidal or homicidal ideation   Orientation:    Patient is alert, awake and oriented x 3   Limited insight, judgement and impulse control       Recent Results (from the past 336 hour(s))   CBC and differential    Collection Time: 01/10/23  8:12 PM   Result Value Ref Range    WBC 7 65 4 31 - 10 16 Thousand/uL    RBC 4 14 3 81 - 5 12 Million/uL    Hemoglobin 11 7 11 5 - 15 4 g/dL    Hematocrit 36 7 34 8 - 46 1 %    MCV 89 82 - 98 fL    MCH 28 3 26 8 - 34 3 pg    MCHC 31 9 31 4 - 37 4 g/dL    RDW 14 2 11 6 - 15 1 %    MPV 9 0 8 9 - 12 7 fL    Platelets 667 884 - 094 Thousands/uL    nRBC 0 /100 WBCs    Neutrophils Relative 67 43 - 75 %    Immat GRANS % 1 0 - 2 %    Lymphocytes Relative 20 14 - 44 %    Monocytes Relative 9 4 - 12 %    Eosinophils Relative 3 0 - 6 %    Basophils Relative 0 0 - 1 %    Neutrophils Absolute 5 15 1 85 - 7 62 Thousands/µL    Immature Grans Absolute 0 05 0 00 - 0 20 Thousand/uL    Lymphocytes Absolute 1 52 0 60 - 4 47 Thousands/µL    Monocytes Absolute 0 65 0 17 - 1 22 Thousand/µL    Eosinophils Absolute 0 26 0 00 - 0 61 Thousand/µL    Basophils Absolute 0 02 0 00 - 0 10 Thousands/µL   Comprehensive metabolic panel    Collection Time: 01/10/23  8:12 PM   Result Value Ref Range    Sodium 139 135 - 147 mmol/L    Potassium 4 0 3 5 - 5 3 mmol/L    Chloride 103 96 - 108 mmol/L    CO2 29 21 - 32 mmol/L    ANION GAP 7 5 - 14 mmol/L    BUN 12 5 - 25 mg/dL    Creatinine 0 70 0 60 - 1 20 mg/dL    Glucose 117 (H) 70 - 99 mg/dL    Calcium 9 4 8 4 - 10 2 mg/dL    AST 19 14 - 36 U/L    ALT 19 <35 U/L    Alkaline Phosphatase 77 43 - 122 U/L    Total Protein 7 0 6 4 - 8 4 g/dL    Albumin 4 1 3 5 - 5 0 g/dL    Total Bilirubin 0 47 0 20 - 1 00 mg/dL    eGFR 100 ml/min/1 73sq m   Clozapine    Collection Time: 01/10/23  8:12 PM   Result Value Ref Range    Clozapine Lvl 944 (H) 350 - 650 ng/mL    NorClozapine 713 Not Estab  ng/mL    Total(Cloz+Norcloz) 1657 ng/mL   C-reactive protein    Collection Time: 01/10/23  8:12 PM   Result Value Ref Range    CRP 51 1 (H) <10 0 mg/L   CK (with reflex to MB)    Collection Time: 01/10/23  8:12 PM   Result Value Ref Range    Total CK 79 30 - 135 U/L   Fingerstick Glucose (POCT)    Collection Time: 01/10/23 10:19 PM   Result Value Ref Range    POC Glucose 102 65 - 140 mg/dl   COVID/FLU/RSV    Collection Time: 01/10/23 11:49 PM    Specimen: Nose; Nares   Result Value Ref Range    SARS-CoV-2 Positive (A) Negative    INFLUENZA A PCR Negative Negative    INFLUENZA B PCR Negative Negative    RSV PCR Negative Negative   ECG 12 lead    Collection Time: 01/11/23  2:12 PM   Result Value Ref Range    Ventricular Rate 97 BPM    Atrial Rate 97 BPM    NH Interval 156 ms    QRSD Interval 76 ms    QT Interval 362 ms    QTC Interval 459 ms    P Axis 58 degrees    QRS Axis -1 degrees    T Wave Axis 74 degrees   ECG 12 lead    Collection Time: 01/11/23  2:12 PM   Result Value Ref Range    Ventricular Rate 97 BPM    Atrial Rate 97 BPM    LA Interval 156 ms    QRSD Interval 76 ms    QT Interval 362 ms    QTC Interval 459 ms    P Axis 58 degrees    QRS Axis -1 degrees    T Wave Axis 74 degrees   ECG 12 lead    Collection Time: 01/12/23  6:31 AM   Result Value Ref Range    Ventricular Rate 102 BPM    Atrial Rate 102 BPM    LA Interval 150 ms    QRSD Interval 76 ms    QT Interval 368 ms    QTC Interval 479 ms    P Axis 65 degrees    QRS Axis 9 degrees    T Wave Axis 77 degrees     Progress Toward Goals: No significant events in the past 24 hours  Clozapine level collected on 1/10/23 at dose of ( 100mg AM and 400mg PM )  is supra therapeutic at 944( nle range 350-650ng/ml)  Clozaril level on   100mg AM and 300mg HS dose is still pending   continue haldol 12 5mg Am and 15mg HS  Principal Problem:    Schizoaffective disorder, bipolar type ([address] )  Active Problems:    LYNN (generalized anxiety disorder)    Post-traumatic stress disorder, chronic    Gastroesophageal reflux disease    Discharge planning update: The patient will return to previous living arrangement, plan to return to  and will resume ECT    Recommended Treatment: Continue with pharmacotherapy, group therapy, milieu therapy and occupational therapy    The patient will be maintained on the following medications:  Current Facility-Administered Medications   Medication Dose Route Frequency Provider Last Rate   • acetaminophen  650 mg Oral Q6H PRN Susan Walhalla, CRNP     • acetaminophen  650 mg Oral Q4H PRN Susan Walhalla, CRNP     • acetaminophen  975 mg Oral Q6H PRN Susan Walhalla, CRNP     • albuterol  2 puff Inhalation Q6H PRN Susan Walhalla, CRNP     • aluminum-magnesium hydroxide-simethicone  30 mL Oral Q4H PRN Susan Walhalla, CRNP     • benzonatate  100 mg Oral TID PRN Ssuan Walhalla, CRNP     • haloperidol lactate  2 5 mg Intramuscular Q6H PRN Max 4/day Lita Cosby JARED Pfeiffer      And   • LORazepam  1 mg Intramuscular Q6H PRN Max [address] [facility], [address]      And   • benztropine  0 5 mg Intramuscular Q6H PRN Max [address] [facility], [address]     • benztropine  1 mg Intramuscular Q4H PRN Max [address] [facility], [address]     • haloperidol lactate  5 mg Intramuscular Q4H PRN Max [address] [facility], [address]      And   • LORazepam  2 mg Intramuscular Q4H PRN Max [address] [facility], [address]      And   • benztropine  1 mg Intramuscular Q4H PRN Max [address] [facility], [address]     • benztropine  1 mg Oral Q4H PRN Max [address] [facility], [address]     • calcium carbonate  500 mg Oral Daily PRN Bindu Offer, CRNP     • cholecalciferol  1,000 Units Oral Daily Bindu Offer, [address]     • cloZAPine  100 mg Oral Daily Bindu Offer, [address]     • cloZAPine  300 mg Oral HS Maggy Ruiz MD     • hydrOXYzine HCL  50 mg Oral Q6H PRN Max [address] [facility], [address]      Or   • diphenhydrAMINE  50 mg Intramuscular Q6H PRN Bindu Offer, CRNP     • docusate sodium  100 mg Oral BID [facility], JARED     • fenofibrate  145 mg Oral Daily [facility], JARED     • fluticasone  1 spray Each Nare BID Bindu Offer, CRNP     • glycerin-hypromellose-  1 drop Both Eyes Q3H PRN Bindu Offer, CRNP     • guaiFENesin  600 mg Oral Q12H [address], [address]     • haloperidol  0 5 mg Oral Q4H PRN Max 6/day [facility], [address]     • haloperidol  1 mg Oral BID PRN Bindu Offer, CRNP     • haloperidol  12 5 mg Oral QAM Maggy Ruiz MD     • haloperidol  15 mg Oral QPM Maggy Ruiz MD     • haloperidol  2 mg Oral Daily PRN Bindu Offer, CRNP     • hydrocortisone   Topical 4x Daily PRN Bindu Offer, CRNP     • hydrocortisone   Topical 4x Daily PRN Bindu Offer, CRNP     • hydrOXYzine HCL  100 mg Oral Q6H PRN Max 4/day JARED Robertson      Or   • LORazepam  2 mg Intramuscular Q6H PRN JARED Vázquez     • hydrOXYzine HCL  25 mg Oral Q6H PRN Max [address] Pfe[address], [address]     • magnesium hydroxide  30 mL Oral Daily PRN JARED Vázquez     • methocarbamol  500 mg Oral Q6H PRN JARED Vázquez     • nicotine  1 patch Transdermal Daily Francisca Teran, [address]     • nicotine polacrilex  4 mg Oral Q2H PRN JARED Vázquez     • ondansetron  4 mg Oral Q6H PRN JARED Vázquez     • pantoprazole  40 mg Oral Early Morning JARED Robertson     • phenol  1 spray Mouth/Throat Q2H PRN Larry Mckeon PA-C     • polyethylene glycol  17 g Oral Daily JARED Robertson     • propranolol  10 mg Oral Q8H PRN JARED Vázquez     • senna-docusate sodium  1 tablet Oral Daily PRN JARED Vázquez     • senna-docusate sodium  2 tablet Oral HS JARED Baldwin     • sorbitol  30 mL Oral Q1H PRN JARED Vázquez     • SUMAtriptan  50 mg Oral Daily PRN JARED Vázquez     • traZODone  50 mg Oral HS PRN JARED Vázquez     • venlafaxine  225 mg Oral Daily Boni Love

## 2023-01-20 NOTE — NURSING NOTE
Patient visible intermittently throughout shift, making phone calls, social with roommate  Compliant with meals and scheduled medications, denies anxiety, depression, SI/HI/AH/VH  Had a brief episode of screaming in her room this morning, however, easily redirectable  Requested and received PRN Haldol 2 mg and Atarax 50 mg @ 4017 with good effect  Safety checks ongoing

## 2023-01-21 RX ADMIN — POLYETHYLENE GLYCOL 3350 17 G: 17 POWDER, FOR SOLUTION ORAL at 08:31

## 2023-01-21 RX ADMIN — NICOTINE 1 PATCH: 21 PATCH, EXTENDED RELEASE TRANSDERMAL at 08:32

## 2023-01-21 RX ADMIN — HALOPERIDOL 12.5 MG: 1 TABLET ORAL at 08:31

## 2023-01-21 RX ADMIN — SENNOSIDES AND DOCUSATE SODIUM 2 TABLET: 8.6; 5 TABLET ORAL at 20:58

## 2023-01-21 RX ADMIN — CLOZAPINE 300 MG: 100 TABLET ORAL at 21:00

## 2023-01-21 RX ADMIN — HALOPERIDOL 15 MG: 5 TABLET ORAL at 17:00

## 2023-01-21 RX ADMIN — FLUTICASONE PROPIONATE 1 SPRAY: 50 SPRAY, METERED NASAL at 17:01

## 2023-01-21 RX ADMIN — CLOZAPINE 100 MG: 100 TABLET ORAL at 08:31

## 2023-01-21 RX ADMIN — FENOFIBRATE 145 MG: 145 TABLET, COATED ORAL at 08:31

## 2023-01-21 RX ADMIN — DOCUSATE SODIUM 100 MG: 100 CAPSULE, LIQUID FILLED ORAL at 08:31

## 2023-01-21 RX ADMIN — GUAIFENESIN 600 MG: 600 TABLET, EXTENDED RELEASE ORAL at 20:58

## 2023-01-21 RX ADMIN — PANTOPRAZOLE SODIUM 40 MG: 40 TABLET, DELAYED RELEASE ORAL at 06:25

## 2023-01-21 RX ADMIN — FLUTICASONE PROPIONATE 1 SPRAY: 50 SPRAY, METERED NASAL at 08:32

## 2023-01-21 RX ADMIN — DOCUSATE SODIUM 100 MG: 100 CAPSULE, LIQUID FILLED ORAL at 17:01

## 2023-01-21 RX ADMIN — VENLAFAXINE HYDROCHLORIDE 225 MG: 37.5 CAPSULE, EXTENDED RELEASE ORAL at 08:31

## 2023-01-21 RX ADMIN — CHOLECALCIFEROL TAB 25 MCG (1000 UNIT) 1000 UNITS: 25 TAB at 08:31

## 2023-01-21 RX ADMIN — GUAIFENESIN 600 MG: 600 TABLET, EXTENDED RELEASE ORAL at 08:32

## 2023-01-21 NOTE — PLAN OF CARE
Problem: Alteration in Thoughts and Perception  Goal: Verbalize thoughts and feelings  Description: Interventions:  - Promote a nonjudgmental and trusting relationship with the patient through active listening and therapeutic communication  - Assess patient's level of functioning, behavior and potential for risk  - Engage patient in 1 on 1 interactions  - Encourage patient to express fears, feelings, frustrations, and discuss symptoms    - [place] patient to reality, help patient recognize reality-based thinking   - Administer medications as ordered and assess for potential side effects  - Provide the patient education related to the signs and symptoms of the illness and desired effects of prescribed medications  Outcome: Progressing     Problem: Alteration in Thoughts and Perception  Goal: Complete daily ADLs, including personal hygiene independently, as able  Description: Interventions:  - Observe, teach, and assist patient with ADLS  - Monitor and promote a balance of rest/activity, with adequate nutrition and elimination   Outcome: Progressing

## 2023-01-21 NOTE — PROGRESS NOTES
Progress Note - Behavioral Health   This note was not shared with the patient due to reasonable likelihood of causing patient harm  Guerda Jasmine 47 y o  female MRN: [id number]   Unit/Bed#: [facility] 024-57 Encounter: [id number]    Behavior over the last 24 hours: karime Castellon was seen today, appears guarded, minimally interactive and internally preoccupied  Reports  fair appetite, sleep however and denies any current or hallucination  She has been compliant with medication and denies any current side effects  Staff report limited participation in groups and on the unit  Pt is Covid positive since 1/10/23  No acute focal neurological deficit, EPS or TD noted  Sleep: slept better  Appetite: normal  Medication side effects: denies  ROS: no complaints, all other systems are negative    Mental Status Evaluation:    Appearance:  age appropriate   Behavior:  guarded   Speech:  normal rate and volume   Mood:  dysphoric   Affect:  constricted   Thought Process:  goal directed   Associations: intact associations   Thought Content:  some paranoia   Perceptual Disturbances: denies auditory hallucinations when asked   Risk Potential: Suicidal ideation - None at present  Homicidal ideation - None at present   Sensorium:  oriented to person, place and time/date   Memory:  recent and remote memory grossly intact   Consciousness:  alert and awake   Attention: attention span and concentration are age appropriate   Insight:  limited   Judgment: limited   Gait/Station: normal gait/station   Motor Activity: no abnormal movements     Vital signs in last 24 hours:    Temp:  [97 4 °F (36 3 °C)-98 °F (36 7 °C)] 97 7 °F (36 5 °C)  HR:  [75-98] 95  Resp:  [16-18] 16  BP: (102-120)/(56-75) 102/72    Laboratory results:   I have personally reviewed all pertinent laboratory/tests results    Most Recent Labs:   Lab Results   Component Value Date    WBC 7 65 01/10/2023    RBC 4 14 01/10/2023    HGB 11 7 01/10/2023    HCT 36 7 01/10/2023  01/10/2023    RDW 14 2 01/10/2023    NEUTROABS 5 15 01/10/2023    SODIUM 139 01/10/2023    K 4 0 01/10/2023     01/10/2023    CO2 29 01/10/2023    BUN 12 01/10/2023    CREATININE 0 70 01/10/2023    GLUC 117 (H) 01/10/2023    GLUF 115 (H) 12/23/2022    CALCIUM 9 4 01/10/2023    AST 19 01/10/2023    ALT 19 01/10/2023    ALKPHOS 77 01/10/2023    TP 7 0 01/10/2023    ALB 4 1 01/10/2023    TBILI 0 47 01/10/2023    CHOLESTEROL 200 (H) 12/02/2022    HDL 51 12/02/2022    TRIG 127 12/02/2022    LDLCALC 124 (H) 12/02/2022    [place] 149 12/02/2022    AMMONIA 28 10/27/2017    [id number] 2 264 12/02/2022    FREET4 0 89 03/24/2016    PREGUR Negative 11/07/2022    PREGSERUM Negative 10/21/2019    HCG <2 00 04/10/2014    RPR Non-Reactive 12/07/2022    HGBA1C 5 6 12/02/2022     12/02/2022       Assessment/Plan   Principal Problem:    Schizoaffective disorder, bipolar type ([facility] [address] )  Active Problems:    LYNN (generalized anxiety disorder)    Post-traumatic stress disorder, chronic    Gastroesophageal reflux disease    Recommended Treatment:     Planned medication and treatment changes: All current active medications have been reviewed  Encourage group therapy, milieu therapy and occupational therapy  Behavioral Health checks every 7 minutes  Requires continued inpatient treatment due to chronic illness and high risk of decompensation if discharged before long term stability is achieved   Ct with current psych meds regimen      Current Facility-Administered Medications   Medication Dose Route Frequency Provider Last Rate   • acetaminophen  650 mg Oral Q6H PRN Forestine Shanks, CRNP     • acetaminophen  650 mg Oral Q4H PRN Forestine Shanks, CRNP     • acetaminophen  975 mg Oral Q6H PRN Forestine Shanks, CRNP     • albuterol  2 puff Inhalation Q6H PRN Forestine Shanks, CRNP     • aluminum-magnesium hydroxide-simethicone  30 mL Oral Q4H PRN Forestine Shanks, CRNP     • benzonatate  100 mg Oral TID PRN JARED Martin     • haloperidol lactate  2 5 mg Intramuscular Q6H PRN Max [address] [facility], [address]      And   • LORazepam  1 mg Intramuscular Q6H PRN Max [address] [facility], [address]      And   • benztropine  0 5 mg Intramuscular Q6H PRN Max [address] [facility], [address]     • benztropine  1 mg Intramuscular Q4H PRN Max [address] [facility], [address]     • haloperidol lactate  5 mg Intramuscular Q4H PRN Max [address] [facility], [address]      And   • LORazepam  2 mg Intramuscular Q4H PRN Max [address] [facility], [address]      And   • benztropine  1 mg Intramuscular Q4H PRN Max [address] [facility], [address]     • benztropine  1 mg Oral Q4H PRN Max [address] [facility], [address]     • calcium carbonate  500 mg Oral Daily PRN JARED Martin     • cholecalciferol  1,000 Units Oral Daily [facility], [address]     • cloZAPine  100 mg Oral Daily Juliane [facility], [address]     • cloZAPine  300 mg Oral HS Lillie Andrea MD     • hydrOXYzine HCL  50 mg Oral Q6H PRN Max [address] [facility], [address]      Or   • diphenhydrAMINE  50 mg Intramuscular Q6H PRN JARED Martin     • docusate sodium  100 mg Oral BID JARED Martin     • fenofibrate  145 mg Oral Daily [facility] JARED Pfeiffer     • fluticasone  1 spray Each Nare BID JARED Martin     • glycerin-hypromellose-  1 drop Both Eyes Q3H PRN JARED Martin     • guaiFENesin  600 mg Oral Q12H [address], [address]     • haloperidol  0 5 mg Oral Q4H PRN Max 6/day [facility], [address]     • haloperidol  1 mg Oral BID PRN JARED Martin     • haloperidol  12 5 mg Oral QAM Lillie Andrea MD     • haloperidol  15 mg Oral QPM iLllie Andrea MD     • haloperidol  2 mg Oral Daily PRN JARED Martin     • hydrocortisone   Topical 4x Daily PRN JARED Martin • hydrocortisone   Topical 4x Daily PRN Gini Crigler, CRNP     • hydrOXYzine HCL  100 mg Oral Q6H PRN Max [address] [place], [place]      Or   • LORazepam  2 mg Intramuscular Q6H PRN Gini Crigler, CRNP     • hydrOXYzine HCL  25 mg Oral Q6H PRN Max [address] [place], [place]     • magnesium hydroxide  30 mL Oral Daily PRN Gini Crigler, CRNP     • methocarbamol  500 mg Oral Q6H PRN Gini Crigler, CRNP     • nicotine  1 patch Transdermal Daily Gini Crigler, [place]     • nicotine polacrilex  4 mg Oral Q2H PRN Gini Crigler, CRNP     • ondansetron  4 mg Oral Q6H PRN Gini Crigler, CRNP     • pantoprazole  40 mg Oral Early Morning JARED Robertson     • phenol  1 spray Mouth/Throat Q2H PRN Martinez Chauhan PA-C     • polyethylene glycol  17 g Oral Daily JARED Robertson     • propranolol  10 mg Oral Q8H PRN Gini Crigler, CRNP     • senna-docusate sodium  1 tablet Oral Daily PRN Gini Crigler, CRNP     • senna-docusate sodium  2 tablet Oral HS JARED Victoria     • sorbitol  30 mL Oral Q1H PRN Gini Crigler, CRNP     • SUMAtriptan  50 mg Oral Daily PRN Gini Crigler, CRNP     • traZODone  50 mg Oral HS PRN Gini Crigler, CRNP     • venlafaxine  225 mg Oral Daily Gini Crigler, CRNP         Risks / Benefits of Treatment:    Risks, benefits, and possible side effects of medications explained to patient and patient verbalizes understanding and agreement for treatment  Counseling / Coordination of Care:    Patient's progress discussed with staff in treatment team meeting  Medications, treatment progress and treatment plan reviewed with patient  Supportive therapy provided to patient  Group attendance encouraged      Rachel Krause MD 01/21/23

## 2023-01-21 NOTE — PLAN OF CARE
Problem: Alteration in Thoughts and Perception  Goal: Treatment Goal: Gain control of psychotic behaviors/thinking, reduce/eliminate presenting symptoms and demonstrate improved reality functioning upon discharge  Outcome: Progressing  Goal: Verbalize thoughts and feelings  Description: Interventions:  - Promote a nonjudgmental and trusting relationship with the patient through active listening and therapeutic communication  - Assess patient's level of functioning, behavior and potential for risk  - Engage patient in 1 on 1 interactions  - Encourage patient to express fears, feelings, frustrations, and discuss symptoms    - [place] patient to reality, help patient recognize reality-based thinking   - Administer medications as ordered and assess for potential side effects  - Provide the patient education related to the signs and symptoms of the illness and desired effects of prescribed medications  Outcome: Progressing  Goal: Refrain from acting on delusional thinking/internal stimuli  Description: Interventions:  - Monitor patient closely, per order   - Utilize least restrictive measures   - Set reasonable limits, give positive feedback for acceptable   - Administer medications as ordered and monitor of potential side effects  Outcome: Progressing  Goal: Agree to be compliant with medication regime, as prescribed and report medication side effects  Description: Interventions:  - Offer appropriate PRN medication and supervise ingestion; conduct AIMS, as needed   Outcome: Progressing  Goal: Recognize dysfunctional thoughts, communicate reality-based thoughts at the time of discharge  Description: Interventions:  - Provide medication and psycho-education to assist patient in compliance and developing insight into his/her illness   Outcome: Progressing  Goal: Complete daily ADLs, including personal hygiene independently, as able  Description: Interventions:  - Observe, teach, and assist patient with ADLS  - Monitor and promote a balance of rest/activity, with adequate nutrition and elimination   Outcome: Progressing     Problem: Ineffective Coping  Goal: Identifies ineffective coping skills  Outcome: Progressing  Goal: Identifies healthy coping skills  Outcome: Progressing  Goal: Demonstrates healthy coping skills  Outcome: Progressing  Goal: Patient/Family participate in treatment and DC plans  Description: Interventions:  - Provide therapeutic environment  Outcome: Progressing  Goal: Patient/Family verbalizes awareness of resources  Outcome: Progressing  Goal: Understands least restrictive measures  Description: Interventions:  - Utilize least restrictive behavior  Outcome: Progressing  Goal: Free from restraint events  Description: - Utilize least restrictive measures   - Provide behavioral interventions   - Redirect inappropriate behaviors   Outcome: Progressing     Problem: Risk for Self Injury/Neglect  Goal: Treatment Goal: Remain safe during length of stay, learn and adopt new coping skills, and be free of self-injurious ideation, impulses and acts at the time of discharge  Outcome: Progressing  Goal: Refrain from harming self  Description: Interventions:  - Monitor patient closely, per order  - Develop a trusting relationship  - Supervise medication ingestion, monitor effects and side effects   Outcome: Progressing  Goal: Recognize maladaptive responses and adopt new coping mechanisms  Outcome: Progressing     Problem: Depression  Goal: Treatment Goal: Demonstrate behavioral control of depressive symptoms, verbalize feelings of improved mood/affect, and adopt new coping skills prior to discharge  Outcome: Progressing  Goal: Refrain from isolation  Description: Interventions:  - Develop a trusting relationship   - Encourage socialization   Outcome: Progressing  Goal: Refrain from self-neglect  Outcome: Progressing     Problem: Anxiety  Goal: Anxiety is at manageable level  Description: Interventions:  - Assess and monitor patient's anxiety level  - Monitor for signs and symptoms (heart palpitations, chest pain, shortness of breath, headaches, nausea, feeling jumpy, restlessness, irritable, apprehensive)  - Collaborate with interdisciplinary team and initiate plan and interventions as ordered    - [place] patient to unit/surroundings  - Explain treatment plan  - Encourage participation in care  - Encourage verbalization of concerns/fears  - Identify coping mechanisms  - Assist in developing anxiety-reducing skills  - Administer/offer alternative therapies  - Limit or eliminate stimulants  Outcome: Progressing     Problem: Risk for Violence/Aggression Toward Others  Goal: Treatment Goal: Refrain from acts of violence/aggression during length of stay, and demonstrate improved impulse control at the time of discharge  Outcome: Progressing  Goal: Refrain from destructive acts on the environment or property  Outcome: Progressing  Goal: Control angry outbursts  Description: Interventions:  - Monitor patient closely, per order  - Ensure early verbal de-escalation  - Monitor prn medication needs  - Set reasonable/therapeutic limits, outline behavioral expectations, and consequences   - Provide a non-threatening milieu, utilizing the least restrictive interventions   Outcome: Progressing  Goal: Identify appropriate positive anger management techniques  Description: Interventions:  - Offer anger management and coping skills groups   - Staff will provide positive feedback for appropriate anger control  Outcome: Progressing

## 2023-01-21 NOTE — NURSING NOTE
Patient visible to make needs known, make phone calls, otherwise lying quietly in bed  Social with roommate, compliant with meals and scheduled medications  Denies anxiety, depression, SI/HI/AH/VH  Safety checks ongoing

## 2023-01-21 NOTE — NURSING NOTE
Pt is calm, pleasant and cooperative on approach  Pt withdrawn to her room, only comes out to use the phone  Denies pain/discomfort  No COVID symptoms noted this shift  Pt is medication compliant  Pt denies SI/HI/AVH  Remained on Q 7 min safety checks

## 2023-01-22 RX ADMIN — CLOZAPINE 100 MG: 100 TABLET ORAL at 08:21

## 2023-01-22 RX ADMIN — PANTOPRAZOLE SODIUM 40 MG: 40 TABLET, DELAYED RELEASE ORAL at 05:43

## 2023-01-22 RX ADMIN — FLUTICASONE PROPIONATE 1 SPRAY: 50 SPRAY, METERED NASAL at 08:26

## 2023-01-22 RX ADMIN — FLUTICASONE PROPIONATE 1 SPRAY: 50 SPRAY, METERED NASAL at 17:55

## 2023-01-22 RX ADMIN — GUAIFENESIN 600 MG: 600 TABLET, EXTENDED RELEASE ORAL at 21:08

## 2023-01-22 RX ADMIN — SENNOSIDES AND DOCUSATE SODIUM 2 TABLET: 8.6; 5 TABLET ORAL at 20:35

## 2023-01-22 RX ADMIN — VENLAFAXINE HYDROCHLORIDE 225 MG: 37.5 CAPSULE, EXTENDED RELEASE ORAL at 08:22

## 2023-01-22 RX ADMIN — METHOCARBAMOL 500 MG: 500 TABLET, FILM COATED ORAL at 17:55

## 2023-01-22 RX ADMIN — HALOPERIDOL 15 MG: 5 TABLET ORAL at 17:36

## 2023-01-22 RX ADMIN — NICOTINE 1 PATCH: 21 PATCH, EXTENDED RELEASE TRANSDERMAL at 08:23

## 2023-01-22 RX ADMIN — CHOLECALCIFEROL TAB 25 MCG (1000 UNIT) 1000 UNITS: 25 TAB at 08:21

## 2023-01-22 RX ADMIN — FENOFIBRATE 145 MG: 145 TABLET, COATED ORAL at 08:21

## 2023-01-22 RX ADMIN — HALOPERIDOL 12.5 MG: 1 TABLET ORAL at 08:21

## 2023-01-22 RX ADMIN — DOCUSATE SODIUM 100 MG: 100 CAPSULE, LIQUID FILLED ORAL at 08:21

## 2023-01-22 RX ADMIN — GUAIFENESIN 600 MG: 600 TABLET, EXTENDED RELEASE ORAL at 08:22

## 2023-01-22 RX ADMIN — HYDROXYZINE HYDROCHLORIDE 50 MG: 50 TABLET, FILM COATED ORAL at 21:37

## 2023-01-22 RX ADMIN — HALOPERIDOL 2 MG: 1 TABLET ORAL at 16:30

## 2023-01-22 RX ADMIN — POLYETHYLENE GLYCOL 3350 17 G: 17 POWDER, FOR SOLUTION ORAL at 08:21

## 2023-01-22 RX ADMIN — CLOZAPINE 300 MG: 100 TABLET ORAL at 21:07

## 2023-01-22 RX ADMIN — DOCUSATE SODIUM 100 MG: 100 CAPSULE, LIQUID FILLED ORAL at 17:36

## 2023-01-22 NOTE — NURSING NOTE
Pt alert, pleasant and cooperative seclusive to self  No symptoms of COVID observed this shift  Denies SI/HI/AVH  Pt is compliant with her medication  Q 7 min safety checks ongoing

## 2023-01-22 NOTE — PLAN OF CARE
Problem: Alteration in Thoughts and Perception  Goal: Treatment Goal: Gain control of psychotic behaviors/thinking, reduce/eliminate presenting symptoms and demonstrate improved reality functioning upon discharge  Outcome: Not Progressing  Goal: Verbalize thoughts and feelings  Description: Interventions:  - Promote a nonjudgmental and trusting relationship with the patient through active listening and therapeutic communication  - Assess patient's level of functioning, behavior and potential for risk  - Engage patient in 1 on 1 interactions  - Encourage patient to express fears, feelings, frustrations, and discuss symptoms    - [place] patient to reality, help patient recognize reality-based thinking   - Administer medications as ordered and assess for potential side effects  - Provide the patient education related to the signs and symptoms of the illness and desired effects of prescribed medications  Outcome: Not Progressing  Goal: Refrain from acting on delusional thinking/internal stimuli  Description: Interventions:  - Monitor patient closely, per order   - Utilize least restrictive measures   - Set reasonable limits, give positive feedback for acceptable   - Administer medications as ordered and monitor of potential side effects  Outcome: Not Progressing  Goal: Agree to be compliant with medication regime, as prescribed and report medication side effects  Description: Interventions:  - Offer appropriate PRN medication and supervise ingestion; conduct AIMS, as needed   Outcome: Not Progressing  Goal: Recognize dysfunctional thoughts, communicate reality-based thoughts at the time of discharge  Description: Interventions:  - Provide medication and psycho-education to assist patient in compliance and developing insight into his/her illness   Outcome: Not Progressing  Goal: Complete daily ADLs, including personal hygiene independently, as able  Description: Interventions:  - Observe, teach, and assist patient with ADLS  - Monitor and promote a balance of rest/activity, with adequate nutrition and elimination   Outcome: Not Progressing     Problem: Ineffective Coping  Goal: Identifies ineffective coping skills  Outcome: Not Progressing  Goal: Identifies healthy coping skills  Outcome: Not Progressing  Goal: Demonstrates healthy coping skills  Outcome: Not Progressing  Goal: Patient/Family participate in treatment and DC plans  Description: Interventions:  - Provide therapeutic environment  Outcome: Not Progressing  Goal: Patient/Family verbalizes awareness of resources  Outcome: Not Progressing  Goal: Understands least restrictive measures  Description: Interventions:  - Utilize least restrictive behavior  Outcome: Not Progressing  Goal: Free from restraint events  Description: - Utilize least restrictive measures   - Provide behavioral interventions   - Redirect inappropriate behaviors   Outcome: Not Progressing     Problem: Risk for Self Injury/Neglect  Goal: Treatment Goal: Remain safe during length of stay, learn and adopt new coping skills, and be free of self-injurious ideation, impulses and acts at the time of discharge  Outcome: Not Progressing  Goal: Refrain from harming self  Description: Interventions:  - Monitor patient closely, per order  - Develop a trusting relationship  - Supervise medication ingestion, monitor effects and side effects   Outcome: Not Progressing  Goal: Recognize maladaptive responses and adopt new coping mechanisms  Outcome: Not Progressing     Problem: Depression  Goal: Treatment Goal: Demonstrate behavioral control of depressive symptoms, verbalize feelings of improved mood/affect, and adopt new coping skills prior to discharge  Outcome: Not Progressing  Goal: Refrain from isolation  Description: Interventions:  - Develop a trusting relationship   - Encourage socialization   Outcome: Not Progressing  Goal: Refrain from self-neglect  Outcome: Not Progressing     Problem: Anxiety  Goal: Anxiety is at manageable level  Description: Interventions:  - Assess and monitor patient's anxiety level  - Monitor for signs and symptoms (heart palpitations, chest pain, shortness of breath, headaches, nausea, feeling jumpy, restlessness, irritable, apprehensive)  - Collaborate with interdisciplinary team and initiate plan and interventions as ordered    - [place] patient to unit/surroundings  - Explain treatment plan  - Encourage participation in care  - Encourage verbalization of concerns/fears  - Identify coping mechanisms  - Assist in developing anxiety-reducing skills  - Administer/offer alternative therapies  - Limit or eliminate stimulants  Outcome: Not Progressing     Problem: Risk for Violence/Aggression Toward Others  Goal: Treatment Goal: Refrain from acts of violence/aggression during length of stay, and demonstrate improved impulse control at the time of discharge  Outcome: Not Progressing  Goal: Refrain from destructive acts on the environment or property  Outcome: Not Progressing  Goal: Control angry outbursts  Description: Interventions:  - Monitor patient closely, per order  - Ensure early verbal de-escalation  - Monitor prn medication needs  - Set reasonable/therapeutic limits, outline behavioral expectations, and consequences   - Provide a non-threatening milieu, utilizing the least restrictive interventions   Outcome: Not Progressing  Goal: Identify appropriate positive anger management techniques  Description: Interventions:  - Offer anger management and coping skills groups   - Staff will provide positive feedback for appropriate anger control  Outcome: Not Progressing

## 2023-01-22 NOTE — NURSING NOTE
Patient is in bed, calm, pleasant on approach  Patient denies all psych s/s  Patient is medication and meals compliant  No further distress noted  Will continue to monitor

## 2023-01-22 NOTE — PROGRESS NOTES
Progress Note - Behavioral Health   This note was not shared with the patient due to reasonable likelihood of causing patient harm  Catia Hartman 47 y o  female MRN: [id number]   Unit/Bed#: [facility] 836-31 Encounter: [id number]    Behavior over the last 24 hours: minimal improvement  Stacey Lobo was seen today, appears less guarded ans states her appetite, sleep have improved  Denies any current or hallucination and asks if she would receive ECT in the unit  She has been compliant with medication and denies any current side effects  Staff report limited participation in groups and on the unit  No acute focal neurological deficit, EPS or TD noted  Sleep: improved  Appetite: normal  Medication side effects: denies  ROS: no complaints, all other systems are negative    Mental Status Evaluation:    Appearance:  age appropriate   Behavior:  calm   Speech:  normal rate and volume   Mood:  depressed   Affect:  constricted   Thought Process:  goal directed   Associations: intact associations   Thought Content:  mild paranoia   Perceptual Disturbances: denies auditory hallucinations when asked   Risk Potential: Suicidal ideation - None at present  Homicidal ideation - None at present   Sensorium:  oriented to person, place and time/date   Memory:  recent and remote memory grossly intact   Consciousness:  alert and awake   Attention: attention span and concentration are age appropriate   Insight:  limited   Judgment: limited   Gait/Station: normal gait/station   Motor Activity: no abnormal movements     Vital signs in last 24 hours:    Temp:  [96 9 °F (36 1 °C)-97 7 °F (36 5 °C)] 96 9 °F (36 1 °C)  HR:  [] 102  Resp:  [16-18] 16  BP: (102-121)/(72-85) 121/72    Laboratory results:   I have personally reviewed all pertinent laboratory/tests results    Most Recent Labs:   Lab Results   Component Value Date    WBC 7 65 01/10/2023    RBC 4 14 01/10/2023    HGB 11 7 01/10/2023    HCT 36 7 01/10/2023     01/10/2023    RDW 14 2 01/10/2023    NEUTROABS 5 15 01/10/2023    SODIUM 139 01/10/2023    K 4 0 01/10/2023     01/10/2023    CO2 29 01/10/2023    BUN 12 01/10/2023    CREATININE 0 70 01/10/2023    GLUC 117 (H) 01/10/2023    GLUF 115 (H) 12/23/2022    CALCIUM 9 4 01/10/2023    AST 19 01/10/2023    ALT 19 01/10/2023    ALKPHOS 77 01/10/2023    TP 7 0 01/10/2023    ALB 4 1 01/10/2023    TBILI 0 47 01/10/2023    CHOLESTEROL 200 (H) 12/02/2022    HDL 51 12/02/2022    TRIG 127 12/02/2022    LDLCALC 124 (H) 12/02/2022    NONHDLC 149 12/02/2022    AMMONIA 28 10/27/2017    PIJ5NIOLKUCU 2 264 12/02/2022    FREET4 0 89 03/24/2016    PREGUR Negative 11/07/2022    PREGSERUM Negative 10/21/2019    HCG <2 00 04/10/2014    RPR Non-Reactive 12/07/2022    HGBA1C 5 6 12/02/2022     12/02/2022       Assessment/Plan   Principal Problem:    Schizoaffective disorder, bipolar type ([facility] [address] )  Active Problems:    LYNN (generalized anxiety disorder)    Post-traumatic stress disorder, chronic    Gastroesophageal reflux disease    Recommended Treatment:     Planned medication and treatment changes: All current active medications have been reviewed  Encourage group therapy, milieu therapy and occupational therapy  Behavioral Health checks every 7 minutes  Requires continued inpatient treatment due to chronic illness and high risk of decompensation if discharged before long term stability is achieved   Ct with current psych meds regimen      Current Facility-Administered Medications   Medication Dose Route Frequency Provider Last Rate   • acetaminophen  650 mg Oral Q6H PRN Bindu Offer, CRNP     • acetaminophen  650 mg Oral Q4H PRN Bindu Offer, CRNP     • acetaminophen  975 mg Oral Q6H PRN Bindu Offer, CRNP     • albuterol  2 puff Inhalation Q6H PRN Bindu Offer, CRNP     • aluminum-magnesium hydroxide-simethicone  30 mL Oral Q4H PRN Bindu Offer, CRNP     • benzonatate  100 mg Oral TID PRN Alex Dos Santos JARED Jay     • haloperidol lactate  2 5 mg Intramuscular Q6H PRN Max [address], [address]      And   • LORazepam  1 mg Intramuscular Q6H PRN Max [address], [address]      And   • benztropine  0 5 mg Intramuscular Q6H PRN Max [address], [address]     • benztropine  1 mg Intramuscular Q4H PRN Max [address] [place], [address]     • haloperidol lactate  5 mg Intramuscular Q4H PRN Max [address], [address]      And   • LORazepam  2 mg Intramuscular Q4H PRN Max [address], [address]      And   • benztropine  1 mg Intramuscular Q4H PRN Max [address], [address]     • benztropine  1 mg Oral Q4H PRN Max [address], [address]     • calcium carbonate  500 mg Oral Daily PRN JARED Billingsley     • cholecalciferol  1,000 Units Oral Daily [facility], [address]     • cloZAPine  100 mg Oral Daily [facility], [address]     • cloZAPine  300 mg Oral HS Yony Reynolds MD     • hydrOXYzine HCL  50 mg Oral Q6H PRN Max [address], [address]      Or   • diphenhydrAMINE  50 mg Intramuscular Q6H PRN JARED Billingsley     • docusate sodium  100 mg Oral BID JARED Robertson     • fenofibrate  145 mg Oral Daily JARED Robertson     • fluticasone  1 spray Each Nare BID JARED Billingsley     • glycerin-hypromellose-  1 drop Both Eyes Q3H PRN JARED Billingsley     • guaiFENesin  600 mg Oral Q12H [address], [address]     • haloperidol  0 5 mg Oral Q4H PRN Max 6/day [facility], [address]     • haloperidol  1 mg Oral BID PRN JARDE Billingsley     • haloperidol  12 5 mg Oral QAM Yony Reynolds MD     • haloperidol  15 mg Oral QPM Yony Reynolds MD     • haloperidol  2 mg Oral Daily PRN JARED Billingsley     • hydrocortisone   Topical 4x Daily PRN JARED Billingsley     • hydrocortisone Topical 4x Daily PRN JARED Gil     • hydrOXYzine HCL  100 mg Oral Q6H PRN Max [address], [place]      Or   • LORazepam  2 mg Intramuscular Q6H PRN JARED Gil     • hydrOXYzine HCL  25 mg Oral Q6H PRN Max [address], [place]     • magnesium hydroxide  30 mL Oral Daily PRN HillJARED Marie     • methocarbamol  500 mg Oral Q6H PRN JARED Gil     • nicotine  1 patch Transdermal Daily Hilldo Murray [place]     • nicotine polacrilex  4 mg Oral Q2H PRN JARED Gil     • ondansetron  4 mg Oral Q6H PRN JARED Gil     • pantoprazole  40 mg Oral Early Morning JARED Robertson     • phenol  1 spray Mouth/Throat Q2H PRN Madeline Yu PA-C     • polyethylene glycol  17 g Oral Daily JARED Robertson     • propranolol  10 mg Oral Q8H PRN JARED Gil     • senna-docusate sodium  1 tablet Oral Daily PRN JARED Gil     • senna-docusate sodium  2 tablet Oral HS JARED Perez     • sorbitol  30 mL Oral Q1H PRN JARED Gil     • SUMAtriptan  50 mg Oral Daily PRN JARED Gil     • traZODone  50 mg Oral HS PRN JARED Gil     • venlafaxine  225 mg Oral Daily JARED Gil         Risks / Benefits of Treatment:    Risks, benefits, and possible side effects of medications explained to patient and patient verbalizes understanding and agreement for treatment  Counseling / Coordination of Care:    Patient's progress discussed with staff in treatment team meeting  Medications, treatment progress and treatment plan reviewed with patient  Supportive therapy provided to patient  Group attendance encouraged      Mack Ordnoez MD 01/22/23

## 2023-01-22 NOTE — PLAN OF CARE
Problem: Alteration in Thoughts and Perception  Goal: Verbalize thoughts and feelings  Description: Interventions:  - Promote a nonjudgmental and trusting relationship with the patient through active listening and therapeutic communication  - Assess patient's level of functioning, behavior and potential for risk  - Engage patient in 1 on 1 interactions  - Encourage patient to express fears, feelings, frustrations, and discuss symptoms    - [place] patient to reality, help patient recognize reality-based thinking   - Administer medications as ordered and assess for potential side effects  - Provide the patient education related to the signs and symptoms of the illness and desired effects of prescribed medications  Outcome: Progressing     Problem: Alteration in Thoughts and Perception  Goal: Agree to be compliant with medication regime, as prescribed and report medication side effects  Description: Interventions:  - Offer appropriate PRN medication and supervise ingestion; conduct AIMS, as needed   Outcome: Progressing     Problem: Alteration in Thoughts and Perception  Goal: Complete daily ADLs, including personal hygiene independently, as able  Description: Interventions:  - Observe, teach, and assist patient with ADLS  - Monitor and promote a balance of rest/activity, with adequate nutrition and elimination   Outcome: Progressing

## 2023-01-22 NOTE — NURSING NOTE
Judith Manuel is calm, pleasant, and cooperative on approach  Judith Manuel denies SI/HI/ Judith Mar continues to hear voices-stating negative comments  Judith Manuel was yelling and screaming in her room  Judithsudhakar Manuel is paranoid and manic  Judithsudhakar Manuel believes people hypnotized her and that is why she is eating more than usual Jo-Ann received 2mg of Haldol PRN  Judith Manuel is visible on the unit  Judithsudhakar Coronach is social with others  Judith Manuel states she is sleeping well  Judith Manuel is labile  Judith Manuel states her anxiety is 1/4 and denies depression  Judithsudhakar Coronach did shower today  Will continue to monitor and assess for safety

## 2023-01-23 LAB
CLOZAPINE SERPL-MCNC: 630 NG/ML (ref 350–650)
CLOZAPINE+NOR SERPL-MCNC: 1152 NG/ML
NORCLOZAPINE SERPL-MCNC: 522 NG/ML

## 2023-01-23 RX ORDER — BENZONATATE 100 MG/1
100 CAPSULE ORAL 3 TIMES DAILY PRN
Status: CANCELLED | OUTPATIENT
Start: 2023-01-23

## 2023-01-23 RX ORDER — CALCIUM CARBONATE 200(500)MG
500 TABLET,CHEWABLE ORAL DAILY PRN
Status: CANCELLED | OUTPATIENT
Start: 2023-01-23

## 2023-01-23 RX ORDER — HALOPERIDOL 0.5 MG/1
0.5 TABLET ORAL
Status: CANCELLED | OUTPATIENT
Start: 2023-01-23

## 2023-01-23 RX ORDER — ACETAMINOPHEN 325 MG/1
650 TABLET ORAL EVERY 6 HOURS PRN
Status: CANCELLED | OUTPATIENT
Start: 2023-01-23

## 2023-01-23 RX ORDER — HALOPERIDOL 1 MG/1
2 TABLET ORAL DAILY PRN
Status: CANCELLED | OUTPATIENT
Start: 2023-01-23

## 2023-01-23 RX ORDER — LORAZEPAM 2 MG/ML
2 INJECTION INTRAMUSCULAR EVERY 6 HOURS PRN
Status: CANCELLED | OUTPATIENT
Start: 2023-01-23

## 2023-01-23 RX ORDER — SORBITOL SOLUTION 70 %
30 SOLUTION, ORAL MISCELLANEOUS
Status: CANCELLED | OUTPATIENT
Start: 2023-01-23

## 2023-01-23 RX ORDER — GUAIFENESIN 600 MG/1
600 TABLET, EXTENDED RELEASE ORAL EVERY 12 HOURS SCHEDULED
Status: CANCELLED | OUTPATIENT
Start: 2023-01-23

## 2023-01-23 RX ORDER — FLUTICASONE PROPIONATE 50 MCG
1 SPRAY, SUSPENSION (ML) NASAL 2 TIMES DAILY
Status: CANCELLED | OUTPATIENT
Start: 2023-01-23

## 2023-01-23 RX ORDER — TRAZODONE HYDROCHLORIDE 50 MG/1
50 TABLET ORAL
Status: CANCELLED | OUTPATIENT
Start: 2023-01-23

## 2023-01-23 RX ORDER — POLYETHYLENE GLYCOL 3350 17 G/17G
17 POWDER, FOR SOLUTION ORAL DAILY
Status: CANCELLED | OUTPATIENT
Start: 2023-01-24

## 2023-01-23 RX ORDER — DOCUSATE SODIUM 100 MG/1
100 CAPSULE, LIQUID FILLED ORAL 2 TIMES DAILY
Status: CANCELLED | OUTPATIENT
Start: 2023-01-23

## 2023-01-23 RX ORDER — LORAZEPAM 2 MG/ML
2 INJECTION INTRAMUSCULAR
Status: CANCELLED | OUTPATIENT
Start: 2023-01-23

## 2023-01-23 RX ORDER — MELATONIN
1000 DAILY
Status: CANCELLED | OUTPATIENT
Start: 2023-01-24

## 2023-01-23 RX ORDER — BENZTROPINE MESYLATE 1 MG/ML
1 INJECTION INTRAMUSCULAR; INTRAVENOUS
Status: CANCELLED | OUTPATIENT
Start: 2023-01-23

## 2023-01-23 RX ORDER — FENOFIBRATE 145 MG/1
145 TABLET, COATED ORAL DAILY
Status: CANCELLED | OUTPATIENT
Start: 2023-01-24

## 2023-01-23 RX ORDER — DIAPER,BRIEF,INFANT-TODD,DISP
EACH MISCELLANEOUS 4 TIMES DAILY PRN
Status: CANCELLED | OUTPATIENT
Start: 2023-01-23

## 2023-01-23 RX ORDER — ALBUTEROL SULFATE 90 UG/1
2 AEROSOL, METERED RESPIRATORY (INHALATION) EVERY 6 HOURS PRN
Status: CANCELLED | OUTPATIENT
Start: 2023-01-23

## 2023-01-23 RX ORDER — PROPRANOLOL HYDROCHLORIDE 10 MG/1
10 TABLET ORAL EVERY 8 HOURS PRN
Status: CANCELLED | OUTPATIENT
Start: 2023-01-23

## 2023-01-23 RX ORDER — PANTOPRAZOLE SODIUM 40 MG/1
40 TABLET, DELAYED RELEASE ORAL
Status: CANCELLED | OUTPATIENT
Start: 2023-01-24

## 2023-01-23 RX ORDER — AMOXICILLIN 250 MG
2 CAPSULE ORAL
Status: CANCELLED | OUTPATIENT
Start: 2023-01-23

## 2023-01-23 RX ORDER — MAGNESIUM HYDROXIDE/ALUMINUM HYDROXICE/SIMETHICONE 120; 1200; 1200 MG/30ML; MG/30ML; MG/30ML
30 SUSPENSION ORAL EVERY 4 HOURS PRN
Status: CANCELLED | OUTPATIENT
Start: 2023-01-23

## 2023-01-23 RX ORDER — METHOCARBAMOL 500 MG/1
500 TABLET, FILM COATED ORAL EVERY 6 HOURS PRN
Status: CANCELLED | OUTPATIENT
Start: 2023-01-23

## 2023-01-23 RX ORDER — HALOPERIDOL 5 MG/ML
2.5 INJECTION INTRAMUSCULAR
Status: CANCELLED | OUTPATIENT
Start: 2023-01-23

## 2023-01-23 RX ORDER — ACETAMINOPHEN 325 MG/1
975 TABLET ORAL EVERY 6 HOURS PRN
Status: CANCELLED | OUTPATIENT
Start: 2023-01-23

## 2023-01-23 RX ORDER — HALOPERIDOL 5 MG/ML
5 INJECTION INTRAMUSCULAR
Status: CANCELLED | OUTPATIENT
Start: 2023-01-23

## 2023-01-23 RX ORDER — HYDROXYZINE 50 MG/1
100 TABLET, FILM COATED ORAL
Status: CANCELLED | OUTPATIENT
Start: 2023-01-23

## 2023-01-23 RX ORDER — HALOPERIDOL 1 MG/1
1 TABLET ORAL 2 TIMES DAILY PRN
Status: CANCELLED | OUTPATIENT
Start: 2023-01-23

## 2023-01-23 RX ORDER — HYDROXYZINE 50 MG/1
50 TABLET, FILM COATED ORAL
Status: CANCELLED | OUTPATIENT
Start: 2023-01-23

## 2023-01-23 RX ORDER — ACETAMINOPHEN 325 MG/1
650 TABLET ORAL EVERY 4 HOURS PRN
Status: CANCELLED | OUTPATIENT
Start: 2023-01-23

## 2023-01-23 RX ORDER — SUMATRIPTAN 50 MG/1
50 TABLET, FILM COATED ORAL DAILY PRN
Status: CANCELLED | OUTPATIENT
Start: 2023-01-23

## 2023-01-23 RX ORDER — HYDROXYZINE HYDROCHLORIDE 25 MG/1
25 TABLET, FILM COATED ORAL
Status: CANCELLED | OUTPATIENT
Start: 2023-01-23

## 2023-01-23 RX ORDER — NICOTINE 21 MG/24HR
1 PATCH, TRANSDERMAL 24 HOURS TRANSDERMAL DAILY
Status: CANCELLED | OUTPATIENT
Start: 2023-01-24

## 2023-01-23 RX ORDER — BENZTROPINE MESYLATE 1 MG/1
1 TABLET ORAL
Status: CANCELLED | OUTPATIENT
Start: 2023-01-23

## 2023-01-23 RX ORDER — ONDANSETRON 4 MG/1
4 TABLET, ORALLY DISINTEGRATING ORAL EVERY 6 HOURS PRN
Status: CANCELLED | OUTPATIENT
Start: 2023-01-23

## 2023-01-23 RX ORDER — CLOZAPINE 100 MG/1
100 TABLET ORAL DAILY
Status: CANCELLED | OUTPATIENT
Start: 2023-01-24

## 2023-01-23 RX ORDER — AMOXICILLIN 250 MG
1 CAPSULE ORAL DAILY PRN
Status: CANCELLED | OUTPATIENT
Start: 2023-01-23

## 2023-01-23 RX ORDER — BENZTROPINE MESYLATE 1 MG/ML
0.5 INJECTION INTRAMUSCULAR; INTRAVENOUS
Status: CANCELLED | OUTPATIENT
Start: 2023-01-23

## 2023-01-23 RX ORDER — DIPHENHYDRAMINE HYDROCHLORIDE 50 MG/ML
50 INJECTION INTRAMUSCULAR; INTRAVENOUS EVERY 6 HOURS PRN
Status: CANCELLED | OUTPATIENT
Start: 2023-01-23

## 2023-01-23 RX ORDER — HYDROCORTISONE 25 MG/G
CREAM TOPICAL 4 TIMES DAILY PRN
Status: CANCELLED | OUTPATIENT
Start: 2023-01-23

## 2023-01-23 RX ORDER — LORAZEPAM 2 MG/ML
1 INJECTION INTRAMUSCULAR
Status: CANCELLED | OUTPATIENT
Start: 2023-01-23

## 2023-01-23 RX ORDER — NICOTINE 21 MG/24HR
1 PATCH, TRANSDERMAL 24 HOURS TRANSDERMAL DAILY
Status: CANCELLED | OUTPATIENT
Start: 2023-01-23

## 2023-01-23 RX ADMIN — CLOZAPINE 300 MG: 100 TABLET ORAL at 21:35

## 2023-01-23 RX ADMIN — POLYETHYLENE GLYCOL 3350 17 G: 17 POWDER, FOR SOLUTION ORAL at 08:25

## 2023-01-23 RX ADMIN — GUAIFENESIN 600 MG: 600 TABLET, EXTENDED RELEASE ORAL at 21:35

## 2023-01-23 RX ADMIN — CHOLECALCIFEROL TAB 25 MCG (1000 UNIT) 1000 UNITS: 25 TAB at 08:25

## 2023-01-23 RX ADMIN — HALOPERIDOL 12.5 MG: 1 TABLET ORAL at 08:24

## 2023-01-23 RX ADMIN — NICOTINE 1 PATCH: 21 PATCH, EXTENDED RELEASE TRANSDERMAL at 08:30

## 2023-01-23 RX ADMIN — CLOZAPINE 100 MG: 100 TABLET ORAL at 08:24

## 2023-01-23 RX ADMIN — SENNOSIDES AND DOCUSATE SODIUM 2 TABLET: 8.6; 5 TABLET ORAL at 20:34

## 2023-01-23 RX ADMIN — FENOFIBRATE 145 MG: 145 TABLET, COATED ORAL at 08:25

## 2023-01-23 RX ADMIN — GUAIFENESIN 600 MG: 600 TABLET, EXTENDED RELEASE ORAL at 08:25

## 2023-01-23 RX ADMIN — METHOCARBAMOL 500 MG: 500 TABLET, FILM COATED ORAL at 21:40

## 2023-01-23 RX ADMIN — HYDROXYZINE HYDROCHLORIDE 100 MG: 50 TABLET ORAL at 18:03

## 2023-01-23 RX ADMIN — HALOPERIDOL 15 MG: 5 TABLET ORAL at 17:02

## 2023-01-23 RX ADMIN — DOCUSATE SODIUM 100 MG: 100 CAPSULE, LIQUID FILLED ORAL at 08:25

## 2023-01-23 RX ADMIN — FLUTICASONE PROPIONATE 1 SPRAY: 50 SPRAY, METERED NASAL at 08:26

## 2023-01-23 RX ADMIN — FLUTICASONE PROPIONATE 1 SPRAY: 50 SPRAY, METERED NASAL at 17:05

## 2023-01-23 RX ADMIN — METHOCARBAMOL 500 MG: 500 TABLET, FILM COATED ORAL at 11:18

## 2023-01-23 RX ADMIN — HYDROXYZINE HYDROCHLORIDE 50 MG: 50 TABLET, FILM COATED ORAL at 11:18

## 2023-01-23 RX ADMIN — HALOPERIDOL 2 MG: 1 TABLET ORAL at 18:03

## 2023-01-23 RX ADMIN — DOCUSATE SODIUM 100 MG: 100 CAPSULE, LIQUID FILLED ORAL at 17:03

## 2023-01-23 RX ADMIN — VENLAFAXINE HYDROCHLORIDE 225 MG: 37.5 CAPSULE, EXTENDED RELEASE ORAL at 08:24

## 2023-01-23 RX ADMIN — PANTOPRAZOLE SODIUM 40 MG: 40 TABLET, DELAYED RELEASE ORAL at 06:24

## 2023-01-23 NOTE — NURSING NOTE
Patient is visible on the unit, social, and cooperative on approach  Patient is medication and meals compliant  Patient continues to endorse A/ V/H, not command in nature  Patient is labile, preoccupied with medications  Patient requested and received atarax for anxiety rated 1/4 due to d/c  Will continue to monitor and maintain safety

## 2023-01-23 NOTE — DISCHARGE SUMMARY
Discharge Summary - [address] [place] 47 y o  female MRN: [id number]  Unit/Bed#: [facility] 631-86 Encounter: [id number]     Admission Date: 1/11/2023         Discharge Date:  1/23/2023    Attending Psychiatrist: Wilver Howard MD    Reason for Admission/HPI: copied from my initial H and P  Patient is a 47year old  female, single, she has 2 children, domiciled in a group home x 1 5 years , History of schizoaffective disorder, LYNN, PTSD, has had multiple IP admissions including state and [facility];   PMH of  HLD, Vit D , chronic midline low back pain  She was admitted voluntary initially to  on 12/14/22 for ECT treatment  from  [facility] where she presented due to  depression, psychosis and SI  Per chart reviewed pt reported SI to OD on pills  [facility] asking her to kill herself, VH of ghosts and seeing through walls and seeing people change form  On  she was treated with haldol 12 5mg BID and Clozaril 100mg AM and 400mg HS and Effexor 225mg daily    Patient also received 7 Bilateral  ECT  last on 1/6/23  which she tolerated well  Patient did not receive ECT treatment on 1/9/23 because she was not NPO  and reported she has eaten a cookie in the early morning hours  On 1/10/23 pt developed diaphoresis, she tested positive for COVID 19 and was transferred to  from  for Covid isolation and continued inpatient treatment and stay  Pt was seen by this writer on COVID isolation unit, coughing and  and sorethroat  She reports feeling tired stayed in bed for the interview  She is guarded minimized her symptoms and feels she will be ready to leave the hospital from here  She no longer needs the ECT and says she is unsure if it has been really helpful  She reports she was initially admitted to  for psychosis and feeling suicidal and anxious   She described the psychosis as wanting to " lash out" because she believed somebody wanted her to do it and had trouble controlling herself and emotions but states things are better now  She  reports she had been complaint with all her medications on admission  including Clozaril but could not tell me what dosages  He reports depressive symptoms have improved, her mood is more positive, eating has improved but she continues to have trouble with sleep   She denies SI today last reported about 1 week ago  No homicidal ideations  She denies AH today but says she sees VH on a daily basis which she reports as flash of light but reports these are not distressing  Per chart pt has history of multiple manic symptoms but currently does not appear manic  Meds/Allergies     all current active meds have been reviewed    Allergies   Allergen Reactions    Naproxen Itching, Swelling and Edema    Latex Itching    Lithium Swelling    Prednisone Other (See Comments)     Pt states interaction with psych meds    Tramadol Swelling    Depakote [Valproic Acid] Swelling and Rash       Objective     Vital signs in last 24 hours:  Temp:  [96 9 °F (36 1 °C)-97 6 °F (36 4 °C)] 96 9 °F (36 1 °C)  HR:  [] 83  Resp:  [16] 16  BP: (103-131)/(64-82) 103/64      Intake/Output Summary (Last 24 hours) at 1/23/2023 1313  Last data filed at 1/23/2023 1232  Gross per 24 hour   Intake 1740 ml   Output --   Net 1740 ml       Hospital Course: The patient was admitted to the inpatient psychiatric unit and started on every 15 minutes precautions  A treatment plan was formed with focus on pharmacotherapy and milieu therapy, group therapy and individual psychotherapy when indicated  Psychiatric medications were titrated over the hospital stay and milieu therapy was utilized  To address the patient's symptoms,the patient was prescribed antidepressant Effexor XR and antipsychotic medication Clozaril  1) Effexor was continued at 225mg daily for depression 2) Clozaril was initially reduced by half due  COVID, sedation and SOB   This was later titrated up to 100mg AM and 300mg HS 3) haldol was continued but increase to 12 5mg Am and 15mg PM for psychosis  Medication doses were titrated during the hospital course  The patient did not display self destructive or aggressive behaviors and did not require restraints  Throughout the hospitalization, the patient did not have falls  Patient's symptoms improved gradually over the hospital course  At the end of treatment the patient was doing well  Mood was stable at the time of discharge  The patient denied suicidal ideation, intent or plan at the time of discharge and denied homicidal ideation, intent or plan at the time of discharge  There was no overt psychosis at the time of discharge  Sleep and appetite were improved  The patient was tolerating medications and was not reporting any significant side effects at the time of discharge  Patient remains psychotic, believes people are out to get her and have been using hypnosis on her, reality testing ability is poor  Affect remains constricted , pt continues to request  multiple PRNS for psychosis  She was initially reluctant to continue ECT but on day of transfer she was in agreement and was able to reason better  Pt has completed COVID quarantine and can be transferred to young adult unit (3B unit) for continued inpatient treatment and stabilization            Mental Status at Time of Discharge:   Appearance:  Adequate hygiene and grooming   Behavior:  calm, cooperative and friendly   Speech:   Language: Normal volume and Normal rate  No overt abnormality   Mood:  Depressed   Affect:   Associations: constricted  Tightly connected   Thought Process:  Goal directed and coherent   Thought Content:  Paranoid and mistrustful and Delusions of persecution   Perceptual Disturbances: Denies hallucinations and does not appear to be responding to internal stimuli     Risk Potential: No suicidal or homicidal ideation   Attention/Concentration  WNL   Insight:  limited   Judgment: Limited Gait/Station: normal gait/station   Motor Activity: No abnormal movement noted       Admission Diagnosis:  Principal Problem:    Schizoaffective disorder, bipolar type ([facility])  Active Problems:    LYNN (generalized anxiety disorder)    Post-traumatic stress disorder, chronic    Gastroesophageal reflux disease      Discharge Diagnosis:     Principal Problem:    Schizoaffective disorder, bipolar type ([address] )  Active Problems:    LYNN (generalized anxiety disorder)    Post-traumatic stress disorder, chronic    Gastroesophageal reflux disease  Resolved Problems:    * No resolved hospital problems  *      Lab results:    Admission on 01/11/2023   Component Date Value    Ventricular Rate 01/11/2023 97     Atrial Rate 01/11/2023 97     NJ Interval 01/11/2023 156     QRSD Interval 01/11/2023 76     QT Interval 01/11/2023 362     QTC Interval 01/11/2023 459     P Axis 01/11/2023 58     QRS [place] 01/11/2023 -1     T Wave [place] 01/11/2023 74     Ventricular Rate 01/12/2023 102     Atrial Rate 01/12/2023 102     NJ Interval 01/12/2023 150     QRSD Interval 01/12/2023 76     QT Interval 01/12/2023 368     QTC Interval 01/12/2023 479     P Axis 01/12/2023 65     QRS Axis 01/12/2023 9     T Wave [place] 01/12/2023 77     Ventricular Rate 01/11/2023 97     Atrial Rate 01/11/2023 97     NJ Interval 01/11/2023 156     QRSD Interval 01/11/2023 76     QT Interval 01/11/2023 362     QTC Interval 01/11/2023 459     P Axis 01/11/2023 58     QRS [place] 01/11/2023 -1     T Wave [place] 01/11/2023 74     Clozapine Lvl 01/17/2023 630     NorClozapine 01/17/2023 522     Total(Cloz+Norcloz) 01/17/2023 1152        Discharge Medications:    See after visit summary for reconciled discharge medications provided to patient and family  Discharge instructions/Information to patient and family:     See after visit summary for information provided to patient and family        Provisions for Follow-Up Care:    See after visit summary for information related to follow-up care and any pertinent home health orders  Discharge Statement     I spent 35 minutes discharging the patient  This time was spent on the day of discharge  I had direct contact with the patient on the day of discharge  Additional documentation is required if more than 30 minutes were spent on discharge:    Patient is being transferred to 3rd floor   I discussed the medication regimen and possible side effects of the medications with Linda Vallecillo prior to discharge  At the time of discharge she was tolerating psychiatric medications  Linda Vallecillo was competent to understand risks and benefits of withholding information and risks and benefits of her actions  Linda Vallecillo is being discharged on 2 antipsychotic agents (Haldol and Clozaril) due to the history of at least 3 antipsychotic medication trials and failure of multiple trials of monotherapy

## 2023-01-23 NOTE — TREATMENT TEAM
01/11/23 0834   Team Meeting   Meeting Type Daily Rounds   Initial Conference Date 01/11/23   Team Members Present   Team Members Present Physician;Nurse;;; Occupational Therapist;Other (Discipline and Name)   Physician Team Member Dr Xu Mccollum, Dr Sol Necessary Team Member Lisa Dasilva, [facility]   Care Management Team Member Jasmyn Youssef           Other (Discipline and Name) pharmacist: Ambrosio Mchugh   Patient/Family Present   Patient Present No   Patient's Family Present No     Transfer from 3P unit due to testing Covid +  ECT treatments were initiated; Pt rec'd 7/12 treatments, plan to resume once off Covid quarantine  201 status, admission due to SI with AH  Currently reports periodic AH and VH 
01/11/23 1606   Team Meeting   Meeting Type Tx Team Meeting   Initial Conference Date 01/11/23   Team Members Present   Team Members Present Physician;Nurse;   Physician Team Member Dr Baldemar Rod Team Member [facility] Management Team Member Beth Andrade   Patient/Family Present   Patient Present Yes   Patient's Family Present No     Treatment plan and goals reviewed  Pt in agreement with treatment plan and signed  Pt aware of plan to return to 3P unit once Covid quarantine is completed 
01/12/23 0856   Team Meeting   Meeting Type Daily Rounds   Initial Conference Date 01/12/23   Team Members Present   Team Members Present Physician;Nurse;Occupational Therapist;;   Physician Team Member Dr Pablo Vaughan, Dr David Lilly Team Member [facility] Management Team Member Tracie Jones           Patient/Family Present   Patient Present No   Patient's Family Present No     Atarax at [phone] which was effective, anxiety 2/4, denies depression, cont to report VH of colors and shapes, denies need for ongoing treatment, refusing ECT at this time, clozaril to be reduced due to covid symptoms, to be titrated back up once improved symptoms
01/13/23 0839   Team Meeting   Meeting Type Daily Rounds   Initial Conference Date 01/13/23   Team Members Present   Team Members Present Physician;Nurse;Occupational Therapist;   Physician Team Member Dr Jordy Scruggs, Dr Richardean Kehr Team Member [address] Management Team Member Carlo   OT Team Member Prudence RODGERS   Patient/Family Present   Patient Present No   Patient's Family Present No     Paranoid, calm and pleasant upon approach, denies SI/HI, cont to reports AH and VH, refused to provide information regarding hallucinations, anxiety 2/4, depression 2/10, showered, eating and sleeping, increasing clozaril today
01/16/23 0836   Team Meeting   Meeting Type Daily Rounds   Initial Conference Date 01/16/23   Team Members Present   Team Members Present Physician;Nurse;;; Occupational Therapist   Physician Team Member Dr Beverly Sullivan Team Member [facility] Management Team Member Otilia Mcmanus           Patient/Family Present   Patient Present No   Patient's Family Present No     Disruptive, screaming, yelling, PRN haldol which was effective, unable to rate depression and anxiety
01/18/23 0838   Team Meeting   Meeting Type Daily Rounds   Initial Conference Date 01/18/23   Team Members Present   Team Members Present Physician;Nurse;;; Occupational Therapist   Physician Team Member Dr Casper Hirsch, Dr Arianna Pratt Team Member Lubna Clarita, [address] [place] [address] [place] Management Team Member Eber Olivera           Patient/Family Present   Patient Present No   Patient's Family Present No     Anxiety 2/4, requested PRN Haldol, given with good effect, brighter, med compliant, willing to consider continuing ECT, PO haldol increased
01/23/23 0825   Team Meeting   Meeting Type Daily Rounds   Initial Conference Date 01/23/23   Team Members Present   Team Members Present Physician;Nurse;Occupational Therapist;;   Physician Team Member Dr Lisbet Wells Team Member [facility] Management Team Member Marta Batista           Patient/Family Present   Patient Present No   Patient's Family Present No     Reports anxiety 1/4, reports feeling hypnotized, cont with paranoia, + AH of negative comments, Requested PRN Haldol with some efficacy, social, slept
Male

## 2023-01-23 NOTE — NURSING NOTE
Juan Dickerson requested PRN Atarax for anxiety  50mg was provided  Will continue to monitor and assess for safety

## 2023-01-23 NOTE — NURSING NOTE
Pt is a 201 transfer from T after testing positive for covid on 3P at [facility]  Pt originally came in from [facility] for depression, increased SI and psychosis  Pt had plan to OD on pills  Pt was also having CAH to kill herself, [facility] and seeing through [place]  Pt was receiving ECT while on 3P but stopped because of covid  Pt has a hx of schizoaffective d/o, LYNN, PTSD, and chronic smoker of 1 5 pack per day  Pt arrived to unit at [phone], skin check completed with MHT and RN  Pt says she is here for "SI and psychotic episodes " Pt says she does not want to resume ECT because "It gives me really bad flashbacks of previous ECT treatments " Pt says she feels like she is ready to go home  Affect is anxious and constricted, denies SI/HI or depression  Reports severe anxiety and AH vague in nature  Denies paranoia at this time but says she usually struggles with paranoia  Requested Haldol for severe agitation  Given 2mg of haldol for agitation and 100mg of atarax for severe anxiety at [address] [facility]  Now resting in bed

## 2023-01-23 NOTE — BH TRANSITION RECORD
Contact Information: If you have any questions, concerns, pended studies, tests and/or procedures, or emergencies regarding your inpatient behavioral health visit  Please contact [address] [place] [address] older adult behavioral health unit 6T [phone] and ask to speak to a , nurse or physician  A contact is available 24 hours/ 7 days a week at this number  Summary of Procedures Performed During your Stay:  Below is a list of major procedures performed during your hospital stay and a summary of results:  - Cardiac Procedures/Studies: EKG  Sinus tachycardia  Low voltage QRS  Possible Inferior infarct (cited on or before 03-JAN-2023)  Abnormal ECG  When compared with ECG of 11-JAN-2023 14:12,  No significant change was found  Confirmed by Alex Lawton ([id number]) on 1/12/2023 9:18:44 PM    Pending Studies (From admission, onward)     Start     Ordered    01/24/23 2100  CBC and differential  Every 2 weeks      Start Status   01/24/23 2100 Scheduled   02/07/23 2100 Scheduled   02/21/23 2100 Scheduled       01/11/23 0147              Please follow up on the above pending studies with your PCP and/or referring provider

## 2023-01-23 NOTE — PLAN OF CARE
Problem: Alteration in Thoughts and Perception  Goal: Treatment Goal: Gain control of psychotic behaviors/thinking, reduce/eliminate presenting symptoms and demonstrate improved reality functioning upon discharge  Outcome: Adequate for Discharge  Goal: Verbalize thoughts and feelings  Description: Interventions:  - Promote a nonjudgmental and trusting relationship with the patient through active listening and therapeutic communication  - Assess patient's level of functioning, behavior and potential for risk  - Engage patient in 1 on 1 interactions  - Encourage patient to express fears, feelings, frustrations, and discuss symptoms    - [place] patient to reality, help patient recognize reality-based thinking   - Administer medications as ordered and assess for potential side effects  - Provide the patient education related to the signs and symptoms of the illness and desired effects of prescribed medications  Outcome: Adequate for Discharge  Goal: Refrain from acting on delusional thinking/internal stimuli  Description: Interventions:  - Monitor patient closely, per order   - Utilize least restrictive measures   - Set reasonable limits, give positive feedback for acceptable   - Administer medications as ordered and monitor of potential side effects  Outcome: Adequate for Discharge  Goal: Agree to be compliant with medication regime, as prescribed and report medication side effects  Description: Interventions:  - Offer appropriate PRN medication and supervise ingestion; conduct AIMS, as needed   Outcome: Adequate for Discharge  Goal: Recognize dysfunctional thoughts, communicate reality-based thoughts at the time of discharge  Description: Interventions:  - Provide medication and psycho-education to assist patient in compliance and developing insight into his/her illness   Outcome: Adequate for Discharge  Goal: Complete daily ADLs, including personal hygiene independently, as able  Description: Interventions:  - Observe, teach, and assist patient with ADLS  - Monitor and promote a balance of rest/activity, with adequate nutrition and elimination   Outcome: Adequate for Discharge     Problem: Ineffective Coping  Goal: Identifies ineffective coping skills  Outcome: Adequate for Discharge  Goal: Identifies healthy coping skills  Outcome: Adequate for Discharge  Goal: Demonstrates healthy coping skills  Outcome: Adequate for Discharge  Goal: Patient/Family participate in treatment and DC plans  Description: Interventions:  - Provide therapeutic environment  Outcome: Adequate for Discharge  Goal: Patient/Family verbalizes awareness of resources  Outcome: Adequate for Discharge  Goal: Understands least restrictive measures  Description: Interventions:  - Utilize least restrictive behavior  Outcome: Adequate for Discharge  Goal: Free from restraint events  Description: - Utilize least restrictive measures   - Provide behavioral interventions   - Redirect inappropriate behaviors   Outcome: Adequate for Discharge     Problem: Risk for Self Injury/Neglect  Goal: Treatment Goal: Remain safe during length of stay, learn and adopt new coping skills, and be free of self-injurious ideation, impulses and acts at the time of discharge  Outcome: Adequate for Discharge  Goal: Refrain from harming self  Description: Interventions:  - Monitor patient closely, per order  - Develop a trusting relationship  - Supervise medication ingestion, monitor effects and side effects   Outcome: Adequate for Discharge  Goal: Recognize maladaptive responses and adopt new coping mechanisms  Outcome: Adequate for Discharge     Problem: Depression  Goal: Treatment Goal: Demonstrate behavioral control of depressive symptoms, verbalize feelings of improved mood/affect, and adopt new coping skills prior to discharge  Outcome: Adequate for Discharge  Goal: Refrain from isolation  Description: Interventions:  - Develop a trusting relationship   - Encourage socialization   Outcome: Adequate for Discharge  Goal: Refrain from self-neglect  Outcome: Adequate for Discharge     Problem: Anxiety  Goal: Anxiety is at manageable level  Description: Interventions:  - Assess and monitor patient's anxiety level  - Monitor for signs and symptoms (heart palpitations, chest pain, shortness of breath, headaches, nausea, feeling jumpy, restlessness, irritable, apprehensive)  - Collaborate with interdisciplinary team and initiate plan and interventions as ordered    - [place] patient to unit/surroundings  - Explain treatment plan  - Encourage participation in care  - Encourage verbalization of concerns/fears  - Identify coping mechanisms  - Assist in developing anxiety-reducing skills  - Administer/offer alternative therapies  - Limit or eliminate stimulants  Outcome: Adequate for Discharge     Problem: Risk for Violence/Aggression Toward Others  Goal: Treatment Goal: Refrain from acts of violence/aggression during length of stay, and demonstrate improved impulse control at the time of discharge  Outcome: Adequate for Discharge  Goal: Refrain from destructive acts on the environment or property  Outcome: Adequate for Discharge  Goal: Control angry outbursts  Description: Interventions:  - Monitor patient closely, per order  - Ensure early verbal de-escalation  - Monitor prn medication needs  - Set reasonable/therapeutic limits, outline behavioral expectations, and consequences   - Provide a non-threatening milieu, utilizing the least restrictive interventions   Outcome: Adequate for Discharge  Goal: Identify appropriate positive anger management techniques  Description: Interventions:  - Offer anger management and coping skills groups   - Staff will provide positive feedback for appropriate anger control  Outcome: Adequate for Discharge

## 2023-01-23 NOTE — PLAN OF CARE
Problem: Alteration in Thoughts and Perception  Goal: Treatment Goal: Gain control of psychotic behaviors/thinking, reduce/eliminate presenting symptoms and demonstrate improved reality functioning upon discharge  Outcome: Not Progressing  Goal: Verbalize thoughts and feelings  Description: Interventions:  - Promote a nonjudgmental and trusting relationship with the patient through active listening and therapeutic communication  - Assess patient's level of functioning, behavior and potential for risk  - Engage patient in 1 on 1 interactions  - Encourage patient to express fears, feelings, frustrations, and discuss symptoms    - [place] patient to reality, help patient recognize reality-based thinking   - Administer medications as ordered and assess for potential side effects  - Provide the patient education related to the signs and symptoms of the illness and desired effects of prescribed medications  Outcome: Not Progressing  Goal: Refrain from acting on delusional thinking/internal stimuli  Description: Interventions:  - Monitor patient closely, per order   - Utilize least restrictive measures   - Set reasonable limits, give positive feedback for acceptable   - Administer medications as ordered and monitor of potential side effects  Outcome: Not Progressing  Goal: Agree to be compliant with medication regime, as prescribed and report medication side effects  Description: Interventions:  - Offer appropriate PRN medication and supervise ingestion; conduct AIMS, as needed   Outcome: Not Progressing  Goal: Recognize dysfunctional thoughts, communicate reality-based thoughts at the time of discharge  Description: Interventions:  - Provide medication and psycho-education to assist patient in compliance and developing insight into his/her illness   Outcome: Not Progressing  Goal: Complete daily ADLs, including personal hygiene independently, as able  Description: Interventions:  - Observe, teach, and assist patient with ADLS  - Monitor and promote a balance of rest/activity, with adequate nutrition and elimination   Outcome: Not Progressing     Problem: Ineffective Coping  Goal: Identifies ineffective coping skills  Outcome: Not Progressing  Goal: Identifies healthy coping skills  Outcome: Not Progressing  Goal: Demonstrates healthy coping skills  Outcome: Not Progressing  Goal: Patient/Family participate in treatment and DC plans  Description: Interventions:  - Provide therapeutic environment  Outcome: Not Progressing  Goal: Patient/Family verbalizes awareness of resources  Outcome: Not Progressing  Goal: Understands least restrictive measures  Description: Interventions:  - Utilize least restrictive behavior  Outcome: Not Progressing  Goal: Free from restraint events  Description: - Utilize least restrictive measures   - Provide behavioral interventions   - Redirect inappropriate behaviors   Outcome: Not Progressing     Problem: Risk for Self Injury/Neglect  Goal: Treatment Goal: Remain safe during length of stay, learn and adopt new coping skills, and be free of self-injurious ideation, impulses and acts at the time of discharge  Outcome: Not Progressing  Goal: Refrain from harming self  Description: Interventions:  - Monitor patient closely, per order  - Develop a trusting relationship  - Supervise medication ingestion, monitor effects and side effects   Outcome: Not Progressing  Goal: Recognize maladaptive responses and adopt new coping mechanisms  Outcome: Not Progressing     Problem: Depression  Goal: Treatment Goal: Demonstrate behavioral control of depressive symptoms, verbalize feelings of improved mood/affect, and adopt new coping skills prior to discharge  Outcome: Not Progressing  Goal: Refrain from isolation  Description: Interventions:  - Develop a trusting relationship   - Encourage socialization   Outcome: Not Progressing  Goal: Refrain from self-neglect  Outcome: Not Progressing     Problem: Anxiety  Goal: Anxiety is at manageable level  Description: Interventions:  - Assess and monitor patient's anxiety level  - Monitor for signs and symptoms (heart palpitations, chest pain, shortness of breath, headaches, nausea, feeling jumpy, restlessness, irritable, apprehensive)  - Collaborate with interdisciplinary team and initiate plan and interventions as ordered    - [place] patient to unit/surroundings  - Explain treatment plan  - Encourage participation in care  - Encourage verbalization of concerns/fears  - Identify coping mechanisms  - Assist in developing anxiety-reducing skills  - Administer/offer alternative therapies  - Limit or eliminate stimulants  Outcome: Not Progressing     Problem: Risk for Violence/Aggression Toward Others  Goal: Treatment Goal: Refrain from acts of violence/aggression during length of stay, and demonstrate improved impulse control at the time of discharge  Outcome: Not Progressing  Goal: Refrain from destructive acts on the environment or property  Outcome: Not Progressing  Goal: Control angry outbursts  Description: Interventions:  - Monitor patient closely, per order  - Ensure early verbal de-escalation  - Monitor prn medication needs  - Set reasonable/therapeutic limits, outline behavioral expectations, and consequences   - Provide a non-threatening milieu, utilizing the least restrictive interventions   Outcome: Not Progressing  Goal: Identify appropriate positive anger management techniques  Description: Interventions:  - Offer anger management and coping skills groups   - Staff will provide positive feedback for appropriate anger control  Outcome: Not Progressing

## 2023-01-24 RX ORDER — LORAZEPAM 1 MG/1
1 TABLET ORAL
Status: DISCONTINUED | OUTPATIENT
Start: 2023-01-24 | End: 2023-01-30 | Stop reason: HOSPADM

## 2023-01-24 RX ORDER — LANOLIN ALCOHOL/MO/W.PET/CERES
3 CREAM (GRAM) TOPICAL
Status: DISCONTINUED | OUTPATIENT
Start: 2023-01-24 | End: 2023-01-30 | Stop reason: HOSPADM

## 2023-01-24 RX ORDER — LORAZEPAM 0.5 MG/1
0.5 TABLET ORAL EVERY MORNING
Status: DISCONTINUED | OUTPATIENT
Start: 2023-01-25 | End: 2023-01-30 | Stop reason: HOSPADM

## 2023-01-24 RX ADMIN — METHOCARBAMOL 500 MG: 500 TABLET, FILM COATED ORAL at 11:29

## 2023-01-24 RX ADMIN — FLUTICASONE PROPIONATE 1 SPRAY: 50 SPRAY, METERED NASAL at 08:20

## 2023-01-24 RX ADMIN — HALOPERIDOL 2 MG: 1 TABLET ORAL at 14:27

## 2023-01-24 RX ADMIN — HALOPERIDOL 15 MG: 5 TABLET ORAL at 17:06

## 2023-01-24 RX ADMIN — LORAZEPAM 1 MG: 1 TABLET ORAL at 21:14

## 2023-01-24 RX ADMIN — DOCUSATE SODIUM 100 MG: 100 CAPSULE, LIQUID FILLED ORAL at 17:07

## 2023-01-24 RX ADMIN — POLYETHYLENE GLYCOL 3350 17 G: 17 POWDER, FOR SOLUTION ORAL at 08:16

## 2023-01-24 RX ADMIN — HYDROXYZINE HYDROCHLORIDE 100 MG: 50 TABLET ORAL at 14:27

## 2023-01-24 RX ADMIN — SENNOSIDES AND DOCUSATE SODIUM 2 TABLET: 8.6; 5 TABLET ORAL at 20:54

## 2023-01-24 RX ADMIN — HALOPERIDOL 12.5 MG: 1 TABLET ORAL at 08:17

## 2023-01-24 RX ADMIN — GUAIFENESIN 600 MG: 600 TABLET, EXTENDED RELEASE ORAL at 08:16

## 2023-01-24 RX ADMIN — DOCUSATE SODIUM 100 MG: 100 CAPSULE, LIQUID FILLED ORAL at 08:16

## 2023-01-24 RX ADMIN — CHOLECALCIFEROL TAB 25 MCG (1000 UNIT) 1000 UNITS: 25 TAB at 08:17

## 2023-01-24 RX ADMIN — GUAIFENESIN 600 MG: 600 TABLET, EXTENDED RELEASE ORAL at 21:15

## 2023-01-24 RX ADMIN — VENLAFAXINE HYDROCHLORIDE 225 MG: 37.5 CAPSULE, EXTENDED RELEASE ORAL at 08:16

## 2023-01-24 RX ADMIN — Medication 3 MG: at 21:14

## 2023-01-24 RX ADMIN — PANTOPRAZOLE SODIUM 40 MG: 40 TABLET, DELAYED RELEASE ORAL at 06:26

## 2023-01-24 RX ADMIN — CLOZAPINE 300 MG: 100 TABLET ORAL at 21:15

## 2023-01-24 RX ADMIN — FLUTICASONE PROPIONATE 1 SPRAY: 50 SPRAY, METERED NASAL at 17:01

## 2023-01-24 RX ADMIN — FENOFIBRATE 145 MG: 145 TABLET, COATED ORAL at 08:16

## 2023-01-24 RX ADMIN — METHOCARBAMOL 500 MG: 500 TABLET, FILM COATED ORAL at 19:28

## 2023-01-24 RX ADMIN — NICOTINE 1 PATCH: 21 PATCH, EXTENDED RELEASE TRANSDERMAL at 08:20

## 2023-01-24 RX ADMIN — CLOZAPINE 100 MG: 100 TABLET ORAL at 08:17
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
## 2023-01-24 NOTE — PROGRESS NOTES
01/24/23 0917   Activity/Group Checklist   Group Community meeting   Attendance Attended   Attendance Duration (min) 16-30   Interactions Interacted appropriately   Affect/Mood Appropriate;Calm   Goals Achieved Able to listen to others; Able to engage in interactions; Able to self-disclose; Able to recieve feedback

## 2023-01-24 NOTE — NURSING NOTE
PRN haldol 2mg and atarax 100mg given for severe anxiety and agitation  Reassessed and effective per pt

## 2023-01-24 NOTE — PROGRESS NOTES
01/24/23 0837   Team Meeting   Meeting Type Daily Rounds   Team Members Present   Team Members Present Physician;Nurse;   Physician Team Member [facility] Team Member [facility] Management Team Member [place]   Patient/Family Present   Patient Present No   Patient's Family Present No   Pt initially on 3p then to 6t due to covid  Pt currently denies all  Pt originally admitted due to psychosis
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
## 2023-01-24 NOTE — PROGRESS NOTES
01/24/23 1000   Activity/Group Checklist   Group Other (Comment)  (Anxiety)   Attendance Attended   Attendance Duration (min) 16-30   Interactions Interacted appropriately   Affect/Mood Appropriate   Goals Achieved Identified feelings; Able to listen to others

## 2023-01-24 NOTE — PLAN OF CARE
Problem: Alteration in Thoughts and Perception  Goal: Treatment Goal: Gain control of psychotic behaviors/thinking, reduce/eliminate presenting symptoms and demonstrate improved reality functioning upon discharge  Outcome: Progressing  Goal: Verbalize thoughts and feelings  Description: Interventions:  - Promote a nonjudgmental and trusting relationship with the patient through active listening and therapeutic communication  - Assess patient's level of functioning, behavior and potential for risk  - Engage patient in 1 on 1 interactions  - Encourage patient to express fears, feelings, frustrations, and discuss symptoms    - [place] patient to reality, help patient recognize reality-based thinking   - Administer medications as ordered and assess for potential side effects  - Provide the patient education related to the signs and symptoms of the illness and desired effects of prescribed medications  Outcome: Progressing  Goal: Refrain from acting on delusional thinking/internal stimuli  Description: Interventions:  - Monitor patient closely, per order   - Utilize least restrictive measures   - Set reasonable limits, give positive feedback for acceptable   - Administer medications as ordered and monitor of potential side effects  Outcome: Progressing  Goal: Agree to be compliant with medication regime, as prescribed and report medication side effects  Description: Interventions:  - Offer appropriate PRN medication and supervise ingestion; conduct AIMS, as needed   Outcome: Progressing  Goal: Recognize dysfunctional thoughts, communicate reality-based thoughts at the time of discharge  Description: Interventions:  - Provide medication and psycho-education to assist patient in compliance and developing insight into his/her illness   Outcome: Progressing  Goal: Complete daily ADLs, including personal hygiene independently, as able  Description: Interventions:  - Observe, teach, and assist patient with ADLS  - Monitor and promote a balance of rest/activity, with adequate nutrition and elimination   Outcome: Progressing     Problem: Risk for Self Injury/Neglect  Goal: Treatment Goal: Remain safe during length of stay, learn and adopt new coping skills, and be free of self-injurious ideation, impulses and acts at the time of discharge  Outcome: Progressing  Goal: Refrain from harming self  Description: Interventions:  - Monitor patient closely, per order  - Develop a trusting relationship  - Supervise medication ingestion, monitor effects and side effects   Outcome: Progressing  Goal: Recognize maladaptive responses and adopt new coping mechanisms  Outcome: Progressing     Problem: Depression  Goal: Treatment Goal: Demonstrate behavioral control of depressive symptoms, verbalize feelings of improved mood/affect, and adopt new coping skills prior to discharge  Outcome: Progressing  Goal: Refrain from isolation  Description: Interventions:  - Develop a trusting relationship   - Encourage socialization   Outcome: Progressing  Goal: Refrain from self-neglect  Outcome: Progressing     Problem: Anxiety  Goal: Anxiety is at manageable level  Description: Interventions:  - Assess and monitor patient's anxiety level  - Monitor for signs and symptoms (heart palpitations, chest pain, shortness of breath, headaches, nausea, feeling jumpy, restlessness, irritable, apprehensive)  - Collaborate with interdisciplinary team and initiate plan and interventions as ordered    - [place] patient to unit/surroundings  - Explain treatment plan  - Encourage participation in care  - Encourage verbalization of concerns/fears  - Identify coping mechanisms  - Assist in developing anxiety-reducing skills  - Administer/offer alternative therapies  - Limit or eliminate stimulants  Outcome: Progressing     Problem: Risk for Violence/Aggression Toward Others  Goal: Treatment Goal: Refrain from acts of violence/aggression during length of stay, and demonstrate improved impulse control at the time of discharge  Outcome: Progressing  Goal: Refrain from destructive acts on the environment or property  Outcome: Progressing  Goal: Control angry outbursts  Description: Interventions:  - Monitor patient closely, per order  - Ensure early verbal de-escalation  - Monitor prn medication needs  - Set reasonable/therapeutic limits, outline behavioral expectations, and consequences   - Provide a non-threatening milieu, utilizing the least restrictive interventions   Outcome: Progressing  Goal: Identify appropriate positive anger management techniques  Description: Interventions:  - Offer anger management and coping skills groups   - Staff will provide positive feedback for appropriate anger control  Outcome: Progressing

## 2023-01-24 NOTE — H&P
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 impulse control, no current psychotic symptoms, compliant with medications, compliant with treatment, willing to continue psychiatric treatment  Based on today's assessment, Shyann Blackmon presents the following risk of harm to others: low    The following interventions are recommended: continued hospitalization on locked unit     Risks / Benefits of Treatment:     Risks, benefits, and possible side effects of medications explained to patient  The patient verbalizes understanding and agreement for treatment  Counseling / Coordination of Care:     Patient's presentation on admission and proposed treatment plan discussed with treatment team   Diagnosis, medication changes and treatment plan reviewed with patient  Recent stressors discussed with patient     Events leading to admission reviewed with patient  Importance of medication and treatment compliance reviewed with patient  Inpatient Psychiatric Certification:     Certification: Estimated length of stay: More than 2 midnights  This note has been constructed using a voice recognition system  There may be translation, syntax, or grammatical errors  If you have any questions, please contact the dictating provider      Noble Cortez MD  Psychiatry PGY-1

## 2023-01-24 NOTE — NURSING NOTE
Upon reassessment at 1903, intervention found to not be effective as pt says she "feels the same " Pt says this sometimes happens when she is around a lot of new people, she feels "like I'm about to go crazy " Now resting in room

## 2023-01-24 NOTE — NURSING NOTE
Patient has been in he room all evening and in her bed - she is awake however  She requested more Atarax approx 2 hours before it was due again and accepted the explanation that it was not available to her yet  She also requested Robaxin which she was given at 2140  She was cooperative with HS medications and was appropriate in interaction

## 2023-01-24 NOTE — TREATMENT PLAN
TREATMENT PLAN REVIEW - [id number] Adonis 47 y o  1970 female MRN: [id number]    [address] Room / Bed: Judith [address] [id number] Encounter: [id number]        Admit Date/Time:  1/11/2023  1:45 AM    Treatment Team: Attending Provider: Keenan Hernandez MD; Registered Nurse: Ivan Stuart, RN; Care Manager: Yue Payne; Patient Care Technician: Shyann Bryan; Registered Nurse: Melody Gilliam RN; Patient Care Assistant: Miroslava Longoria; : Cherie Hou; Medical Student: Martell Wolf    Diagnosis: Principal Problem:    Schizoaffective disorder, bipolar type ([facility] )  Active Problems:    LYNN (generalized anxiety disorder)    Post-traumatic stress disorder, chronic    Gastroesophageal reflux disease    Patient Strengths/Assets: ability for insight, cooperative, communication skills, compliant with medication, motivated, motivation for treatment/growth, patient is on a voluntary commitment, patient is willing to work on problems, special hobby/interest, support at group home      Patient Barriers/Limitations: chronic pain, chronic mental illness    Short Term Goals: decrease in depressive symptoms, decrease in paranoid thoughts, decrease in psychotic symptoms, decrease in suicidal thoughts, ability to stay safe on the unit, ability to stay free from restraints, improvement in insight, improvement in impulse control, sleep improvement, tolerate medications, mood stabilization, increase in group attendance, increase in group participation, increase in socialization with peers on the unit, acceptance of psychiatric medications    Long Term Goals: improvement in depression, stabilization of mood, free of suicidal thoughts, resolution of psychotic symptoms, improved reasoning ability, no agitation on the unit, no aggressive behavior on the unit, acceptance of need for psychiatric medications, acceptance of need for psychiatric treatment, acceptance of need for psychiatric follow up after discharge, adequate self care, adequate sleep, appropriate interaction with peers, appropriate interaction with family, stable living arrangements upon discharge    Progress Towards Goals: starting psychiatric medications as prescribed, progressing, mood is stabilizing, depression is improving, less depressed    Recommended Treatment: medication management, patient medication education, group therapy, milieu therapy, continued Behavioral Health psychiatric evaluation/assessment process     Treatment Frequency: daily medication monitoring, group and milieu therapy daily, monitoring through interdisciplinary rounds, monitoring through weekly patient care conferences    Expected Discharge Date: TBD    Discharge Plan: discharge to home    Treatment Plan Created/Updated By: Lexii Back MD

## 2023-01-24 NOTE — NURSING NOTE
Pt visible on unit throughout the day  Focused on discharge  Attending groups  Focused on medications  Medication and meal compliant  Can be demanding at times, but verbally redirectable  Denies psych symptoms initially, then reporting hallucinations and anxiety throughout day, did not want to disclose what her AH were saying  Denies any unmet needs or complaints

## 2023-01-24 NOTE — PROGRESS NOTES
01/24/23 1400   Activity/Group Checklist   Group Other (Comment)   Attendance Attended  (Recovery)   Attendance Duration (min) 31-45   Interactions Interacted appropriately   Affect/Mood Appropriate   Goals Achieved Identified feelings; Able to engage in interactions; Able to self-disclose     Cindy Haynes had difficulty focusing on the meeting at first but ended the meeting by sharing her feelings in an insightful way

## 2023-01-25 RX ADMIN — FLUTICASONE PROPIONATE 1 SPRAY: 50 SPRAY, METERED NASAL at 17:28

## 2023-01-25 RX ADMIN — CHOLECALCIFEROL TAB 25 MCG (1000 UNIT) 1000 UNITS: 25 TAB at 08:41

## 2023-01-25 RX ADMIN — DOCUSATE SODIUM 100 MG: 100 CAPSULE, LIQUID FILLED ORAL at 08:41

## 2023-01-25 RX ADMIN — CLOZAPINE 100 MG: 100 TABLET ORAL at 08:41

## 2023-01-25 RX ADMIN — NICOTINE 1 PATCH: 21 PATCH, EXTENDED RELEASE TRANSDERMAL at 08:44

## 2023-01-25 RX ADMIN — DOCUSATE SODIUM 100 MG: 100 CAPSULE, LIQUID FILLED ORAL at 17:27

## 2023-01-25 RX ADMIN — HYDROXYZINE HYDROCHLORIDE 100 MG: 50 TABLET ORAL at 11:59

## 2023-01-25 RX ADMIN — FENOFIBRATE 145 MG: 145 TABLET, COATED ORAL at 08:41

## 2023-01-25 RX ADMIN — LORAZEPAM 0.5 MG: 0.5 TABLET ORAL at 08:41

## 2023-01-25 RX ADMIN — Medication 3 MG: at 21:18

## 2023-01-25 RX ADMIN — HALOPERIDOL 12.5 MG: 1 TABLET ORAL at 08:41

## 2023-01-25 RX ADMIN — POLYETHYLENE GLYCOL 3350 17 G: 17 POWDER, FOR SOLUTION ORAL at 08:40

## 2023-01-25 RX ADMIN — LORAZEPAM 1 MG: 1 TABLET ORAL at 21:18

## 2023-01-25 RX ADMIN — HALOPERIDOL 15 MG: 5 TABLET ORAL at 17:27

## 2023-01-25 RX ADMIN — GUAIFENESIN 600 MG: 600 TABLET, EXTENDED RELEASE ORAL at 21:18

## 2023-01-25 RX ADMIN — PANTOPRAZOLE SODIUM 40 MG: 40 TABLET, DELAYED RELEASE ORAL at 06:49

## 2023-01-25 RX ADMIN — HYDROCORTISONE 1 APPLICATION: 25 CREAM TOPICAL at 13:05

## 2023-01-25 RX ADMIN — HALOPERIDOL 2 MG: 1 TABLET ORAL at 14:41

## 2023-01-25 RX ADMIN — SENNOSIDES AND DOCUSATE SODIUM 2 TABLET: 8.6; 5 TABLET ORAL at 20:59

## 2023-01-25 RX ADMIN — VENLAFAXINE HYDROCHLORIDE 225 MG: 37.5 CAPSULE, EXTENDED RELEASE ORAL at 08:41

## 2023-01-25 RX ADMIN — CLOZAPINE 300 MG: 100 TABLET ORAL at 21:17

## 2023-01-25 RX ADMIN — METHOCARBAMOL 500 MG: 500 TABLET, FILM COATED ORAL at 11:59

## 2023-01-25 RX ADMIN — GUAIFENESIN 600 MG: 600 TABLET, EXTENDED RELEASE ORAL at 08:41

## 2023-01-25 NOTE — PLAN OF CARE
Problem: Alteration in Thoughts and Perception  Goal: Treatment Goal: Gain control of psychotic behaviors/thinking, reduce/eliminate presenting symptoms and demonstrate improved reality functioning upon discharge  Outcome: Progressing  Goal: Verbalize thoughts and feelings  Description: Interventions:  - Promote a nonjudgmental and trusting relationship with the patient through active listening and therapeutic communication  - Assess patient's level of functioning, behavior and potential for risk  - Engage patient in 1 on 1 interactions  - Encourage patient to express fears, feelings, frustrations, and discuss symptoms    - [place] patient to reality, help patient recognize reality-based thinking   - Administer medications as ordered and assess for potential side effects  - Provide the patient education related to the signs and symptoms of the illness and desired effects of prescribed medications  Outcome: Progressing  Goal: Refrain from acting on delusional thinking/internal stimuli  Description: Interventions:  - Monitor patient closely, per order   - Utilize least restrictive measures   - Set reasonable limits, give positive feedback for acceptable   - Administer medications as ordered and monitor of potential side effects  Outcome: Progressing  Goal: Agree to be compliant with medication regime, as prescribed and report medication side effects  Description: Interventions:  - Offer appropriate PRN medication and supervise ingestion; conduct AIMS, as needed   Outcome: Progressing  Goal: Recognize dysfunctional thoughts, communicate reality-based thoughts at the time of discharge  Description: Interventions:  - Provide medication and psycho-education to assist patient in compliance and developing insight into his/her illness   Outcome: Progressing  Goal: Complete daily ADLs, including personal hygiene independently, as able  Description: Interventions:  - Observe, teach, and assist patient with ADLS  - Monitor and promote a balance of rest/activity, with adequate nutrition and elimination   Outcome: Progressing     Problem: Risk for Self Injury/Neglect  Goal: Treatment Goal: Remain safe during length of stay, learn and adopt new coping skills, and be free of self-injurious ideation, impulses and acts at the time of discharge  Outcome: Progressing  Goal: Refrain from harming self  Description: Interventions:  - Monitor patient closely, per order  - Develop a trusting relationship  - Supervise medication ingestion, monitor effects and side effects   Outcome: Progressing  Goal: Recognize maladaptive responses and adopt new coping mechanisms  Outcome: Progressing     Problem: Depression  Goal: Treatment Goal: Demonstrate behavioral control of depressive symptoms, verbalize feelings of improved mood/affect, and adopt new coping skills prior to discharge  Outcome: Progressing  Goal: Refrain from isolation  Description: Interventions:  - Develop a trusting relationship   - Encourage socialization   Outcome: Progressing  Goal: Refrain from self-neglect  Outcome: Progressing     Problem: Anxiety  Goal: Anxiety is at manageable level  Description: Interventions:  - Assess and monitor patient's anxiety level  - Monitor for signs and symptoms (heart palpitations, chest pain, shortness of breath, headaches, nausea, feeling jumpy, restlessness, irritable, apprehensive)  - Collaborate with interdisciplinary team and initiate plan and interventions as ordered    - [place] patient to unit/surroundings  - Explain treatment plan  - Encourage participation in care  - Encourage verbalization of concerns/fears  - Identify coping mechanisms  - Assist in developing anxiety-reducing skills  - Administer/offer alternative therapies  - Limit or eliminate stimulants  Outcome: Progressing     Problem: Risk for Violence/Aggression Toward Others  Goal: Treatment Goal: Refrain from acts of violence/aggression during length of stay, and demonstrate improved impulse control at the time of discharge  Outcome: Progressing  Goal: Refrain from destructive acts on the environment or property  Outcome: Progressing  Goal: Control angry outbursts  Description: Interventions:  - Monitor patient closely, per order  - Ensure early verbal de-escalation  - Monitor prn medication needs  - Set reasonable/therapeutic limits, outline behavioral expectations, and consequences   - Provide a non-threatening milieu, utilizing the least restrictive interventions   Outcome: Progressing  Goal: Identify appropriate positive anger management techniques  Description: Interventions:  - Offer anger management and coping skills groups   - Staff will provide positive feedback for appropriate anger control  Outcome: Progressing     Problem: DISCHARGE PLANNING - CARE MANAGEMENT  Goal: Discharge to post-acute care or home with appropriate resources  Description: INTERVENTIONS:  - Conduct assessment to determine patient/family and health care team treatment goals, and need for post-acute services based on payer coverage, community resources, and patient preferences, and barriers to discharge  - Address psychosocial, clinical, and financial barriers to discharge as identified in assessment in conjunction with the patient/family and health care team  - Arrange appropriate level of post-acute services according to patient’s   needs and preference and payer coverage in collaboration with the physician and health care team  - Communicate with and update the patient/family, physician, and health care team regarding progress on the discharge plan  - Arrange appropriate transportation to post-acute venues  Outcome: Progressing

## 2023-01-25 NOTE — NURSING NOTE
Pt isolative to room unless out to make needs known  Med and meal compliant  Can be impatient with requests at times  Denies SI/HI/AH/VH at this time   Denies any unmet needs at this time

## 2023-01-25 NOTE — PROGRESS NOTES
01/25/23 0928   Activity/Group Checklist   Group Community meeting   Attendance Attended   Attendance Duration (min) 16-30   Interactions Interacted appropriately   Affect/Mood Appropriate   Goals Achieved Able to listen to others; Able to engage in interactions; Able to self-disclose

## 2023-01-25 NOTE — NURSING NOTE
Pt requesting prn haldol for AH that she declines to discuss  Prn haldol 2 mg given po  Reassessed and effective per pt

## 2023-01-25 NOTE — PROGRESS NOTES
01/25/23 1000   Activity/Group Checklist   Group Other (Comment)  (Group Art Therapy/Psychodynamic, Creatively Exploring Risk and Potential)   Attendance Attended   Attendance Duration (min) 46-60   Interactions Interacted appropriately   Affect/Mood Appropriate   Goals Achieved Able to listen to others; Able to engage in interactions; Able to recieve feedback  (Able to engage materials; partial participation due to patient left before discussion)

## 2023-01-25 NOTE — NURSING NOTE
Patient reported Robaxin prn effective in relieving muscular discomfort  She has been in the milieu for needs - phone, iced water and snack at HS  She was cooperative with staff, with routine and with HS medications  Very focussed on getting her Ativan this evening and was relieved when she heard she was   Guarded, suspicious

## 2023-01-25 NOTE — PROGRESS NOTES
Progress Note - Behavioral Health   Joe Richter 47 y o  female MRN: [id number]  Unit/Bed#: [facility] 342-02 Encounter: [id number]    Assessment/Plan   Principal Problem:    Schizoaffective disorder, bipolar type ([address] )  Active Problems:    LYNN (generalized anxiety disorder)    Post-traumatic stress disorder, chronic    Gastroesophageal reflux disease      • Continue Clozapine 100 mg qdaily and 300 mg QHS   • Continue Haldol 12 5 mg every morning and Haldol 15 mg QHS   • Continue Ativan 0 5 qAM and 1 mg QHS   • Continue Effexor 225 daily  • Continue Melatonin 3 mg QHS   • Continue to promote patient participation in therapeutic milieu  • Continue medical management by primary team  • Legal Status: 201   • Discharge disposition: Tentative plan for discharge later this weeek    Subjective: The patient was evaluated this morning for continuity of care and no acute distress noted throughout the evaluation  Per nursing report over the past 24 hours the patient was observed having muscular discomfort and requested Robaxin for it  She was cooperative with staff  She is medication and meal compliant  Today on my assessment the patient was sitting comfortably in her bed and reports her mood is "good"  She describes his appetite as "good"  She describes his sleep as "good"  She describes his energy as "good"  She denies suicidal and homicidal ideation  He also denies visual and auditory hallucinations  She told the treatment team that she goes to all of the groups and finds them helpful  She was a little upset when the treatment team updated her that she would likely be discharged early next week due to her group home likely needing more time to accept her back  She did ultimately accept this but did ask that we push for the discharge to be sooner as she would like to go back as soon as possible   When asked about her requesting PRN Haldol she states she asked for it because she was having olfactory hallucinations of someone selling drugs and it was making her mad and feel like she had a panic attack and she states in the past the haldol PRN will help calm her down  She did not have any signs of agitation during this time period       Vitals: Reviewed, within normal limits   PRN: Haldol, Atarax, Robaxin     Current Medications:  Current Facility-Administered Medications   Medication Dose Route Frequency Provider Last Rate   • acetaminophen  650 mg Oral Q6H PRN Mark Shane, CRNP     • acetaminophen  650 mg Oral Q4H PRN Mark [place], CRNP     • acetaminophen  975 mg Oral Q6H PRN Mark Shane, CRNP     • albuterol  2 puff Inhalation Q6H PRN Mark [place], CRNP     • aluminum-magnesium hydroxide-simethicone  30 mL Oral Q4H PRN Mark [place], CRNP     • benzonatate  100 mg Oral TID PRN Mark Seattle, CRNP     • haloperidol lactate  2 5 mg Intramuscular Q6H PRN Max [address], [place]      And   • LORazepam  1 mg Intramuscular Q6H PRN Max [address], [place]      And   • benztropine  0 5 mg Intramuscular Q6H PRN Max [address], [place]     • benztropine  1 mg Intramuscular Q4H PRN Max [address], [place]     • haloperidol lactate  5 mg Intramuscular Q4H PRN Max [address], [place]      And   • LORazepam  2 mg Intramuscular Q4H PRN Max [address], [place]      And   • benztropine  1 mg Intramuscular Q4H PRN Max [address], [place]     • benztropine  1 mg Oral Q4H PRN Max [address], [place]     • calcium carbonate  500 mg Oral Daily PRN Mark Shane, CRNP     • cholecalciferol  1,000 Units Oral Daily [place], [place]     • cloZAPine  100 mg Oral Daily [place], [place]     • cloZAPine  300 mg Oral HS Stella Foster MD     • hydrOXYzine HCL  50 mg Oral Q6H PRN Max [address], [place]      Or   • diphenhydrAMINE  50 mg Intramuscular Q6H PRN Gini Crigler, CRNP     • docusate sodium  100 mg Oral BID Richardburgh Guadalupe, CRNP     • fenofibrate  145 mg Oral Daily [facility], [address] [place] [address]     • fluticasone  1 spray Each Nare BID Gini Crigler, CRNP     • glycerin-hypromellose-  1 drop Both Eyes Q3H PRN Gini Crigler, CRNP     • guaiFENesin  600 mg Oral Q12H [address] [facility], [address] [place] [address]     • haloperidol  0 5 mg Oral Q4H PRN Max 6/day [facility], [address] [place] [address]     • haloperidol  1 mg Oral BID PRN Gini Crigler, CRNP     • haloperidol  12 5 mg Oral QAM Ulisses Cardoso MD     • haloperidol  15 mg Oral QPM Ulisses Cardoso MD     • haloperidol  2 mg Oral Daily PRN Gini Crigler, CRNP     • hydrocortisone   Topical 4x Daily PRN Gini Crigler, CRNP     • hydrocortisone   Topical 4x Daily PRN Gini Crigler, CRNP     • hydrOXYzine HCL  100 mg Oral Q6H PRN Max [address] [facility], [address] [place] [address]      Or   • LORazepam  2 mg Intramuscular Q6H PRN Gini Crigler, CRNP     • hydrOXYzine HCL  25 mg Oral Q6H [facility]N [facility] [address] [facility]     • LORazepam  0 5 mg Oral QA Genoveva Ballard MD     • LORazepam  1 mg Oral  Genoveva Ballard MD     • magnesium hydroxide  30 mL Oral Daily PRN Gini Crigler, CRNP     • melatonin  3 mg Oral  Genoveva Ballard MD     • methocarbamol  500 mg Oral Q6H PRN Gini Crigler, CRNP     • nicotine  1 patch Transdermal Daily Gini Crigler, [facility]     • nicotine polacrilex  4 mg Oral Q2H PRN Gini Crigler, CRNP     • ondansetron  4 mg Oral Q6H PRN Gini Crigler, CRNP     • pantoprazole  40 mg Oral Early Morning [facility] JARED Pfeiffer     • phenol  1 spray Mouth/Throat Q2H PRN Martinez Chauhan PA-C     • polyethylene glycol  17 g Oral Daily [facility] JARED Pfeiffer     • propranolol  10 mg Oral Q8H PRN Gini Crigler, CRNP     • senna-docusate sodium  1 tablet Oral Daily PRN Mark Lynn, [address]     • senna-docusate sodium  2 tablet Oral HS Faith RamosJARED [place]     • sorbitol  30 mL Oral Q1H PRN JARED [place]     • SUMAtriptan  50 mg Oral Daily PRN JARED [place]     • traZODone  50 mg Oral HS PRN JARED [place]     • venlafaxine  225 mg Oral Daily Nick Robertson Medications:   all current active meds have been reviewed, continue current psychiatric medications and current meds:   Current Facility-Administered Medications   Medication Dose Route Frequency   • acetaminophen (TYLENOL) tablet 650 mg  650 mg Oral Q6H PRN   • acetaminophen (TYLENOL) tablet 650 mg  650 mg Oral Q4H PRN   • acetaminophen (TYLENOL) tablet 975 mg  975 mg Oral Q6H PRN   • albuterol (PROVENTIL HFA,VENTOLIN HFA) inhaler 2 puff  2 puff Inhalation Q6H PRN   • aluminum-magnesium hydroxide-simethicone (MYLANTA) oral suspension 30 mL  30 mL Oral Q4H PRN   • benzonatate (TESSALON PERLES) capsule 100 mg  100 mg Oral TID PRN   • haloperidol lactate (HALDOL) injection 2 5 mg  2 5 mg Intramuscular Q6H PRN Max 4/day    And   • LORazepam (ATIVAN) injection 1 mg  1 mg Intramuscular Q6H PRN Max 4/day    And   • benztropine (COGENTIN) injection 0 5 mg  0 5 mg Intramuscular Q6H PRN Max 4/day   • benztropine (COGENTIN) injection 1 mg  1 mg Intramuscular Q4H PRN Max 6/day   • haloperidol lactate (HALDOL) injection 5 mg  5 mg Intramuscular Q4H PRN Max 4/day    And   • LORazepam (ATIVAN) injection 2 mg  2 mg Intramuscular Q4H PRN Max 4/day    And   • benztropine (COGENTIN) injection 1 mg  1 mg Intramuscular Q4H PRN Max 4/day   • benztropine (COGENTIN) tablet 1 mg  1 mg Oral Q4H PRN Max 6/day   • calcium carbonate (TUMS) chewable tablet 500 mg  500 mg Oral Daily PRN   • cholecalciferol (VITAMIN D3) tablet 1,000 Units  1,000 Units Oral Daily   • cloZAPine (CLOZARIL) tablet 100 mg  100 mg Oral Daily   • cloZAPine (CLOZARIL) tablet 300 mg 300 mg Oral HS   • hydrOXYzine HCL (ATARAX) tablet 50 mg  50 mg Oral Q6H PRN Max 4/day    Or   • diphenhydrAMINE (BENADRYL) injection 50 mg  50 mg Intramuscular Q6H PRN   • docusate sodium (COLACE) capsule 100 mg  100 mg Oral BID   • fenofibrate (TRICOR) tablet 145 mg  145 mg Oral Daily   • fluticasone (FLONASE) 50 mcg/act nasal spray 1 spray  1 spray Each Nare BID   • glycerin-hypromellose- (ARTIFICIAL TEARS) ophthalmic solution 1 drop  1 drop Both Eyes Q3H PRN   • guaiFENesin (MUCINEX) 12 hr tablet 600 mg  600 mg Oral Q12H KENNY   • haloperidol (HALDOL) tablet 0 5 mg  0 5 mg Oral Q4H PRN Max 6/day   • haloperidol (HALDOL) tablet 1 mg  1 mg Oral BID PRN   • haloperidol (HALDOL) tablet 12 5 mg  12 5 mg Oral QAM   • haloperidol (HALDOL) tablet 15 mg  15 mg Oral QPM   • haloperidol (HALDOL) tablet 2 mg  2 mg Oral Daily PRN   • hydrocortisone (ANUSOL-HC) 2 5 % rectal cream   Topical 4x Daily PRN   • hydrocortisone 1 % cream   Topical 4x Daily PRN   • hydrOXYzine HCL (ATARAX) tablet 100 mg  100 mg Oral Q6H PRN Max 4/day    Or   • LORazepam (ATIVAN) injection 2 mg  2 mg Intramuscular Q6H PRN   • hydrOXYzine HCL (ATARAX) tablet 25 mg  25 mg Oral Q6H PRN Max 4/day   • LORazepam (ATIVAN) tablet 0 5 mg  0 5 mg Oral QAM   • LORazepam (ATIVAN) tablet 1 mg  1 mg Oral HS   • magnesium hydroxide (MILK OF MAGNESIA) oral suspension 30 mL  30 mL Oral Daily PRN   • melatonin tablet 3 mg  3 mg Oral HS   • methocarbamol (ROBAXIN) tablet 500 mg  500 mg Oral Q6H PRN   • nicotine (NICODERM CQ) 21 mg/24 hr TD 24 hr patch 1 patch  1 patch Transdermal Daily   • nicotine polacrilex (NICORETTE) gum 4 mg  4 mg Oral Q2H PRN   • ondansetron (ZOFRAN-ODT) dispersible tablet 4 mg  4 mg Oral Q6H PRN   • pantoprazole (PROTONIX) EC tablet 40 mg  40 mg Oral Early Morning   • phenol (CHLORASEPTIC) 1 4 % mucosal liquid 1 spray  1 spray Mouth/Throat Q2H PRN   • polyethylene glycol (MIRALAX) packet 17 g  17 g Oral Daily   • propranolol (INDERAL) tablet 10 mg  10 mg Oral Q8H PRN   • senna-docusate sodium (SENOKOT S) 8 6-50 mg per tablet 1 tablet  1 tablet Oral Daily PRN   • senna-docusate sodium (SENOKOT S) 8 6-50 mg per tablet 2 tablet  2 tablet Oral HS   • sorbitol 70 % solution 30 mL  30 mL Oral Q1H PRN   • SUMAtriptan (IMITREX) tablet 50 mg  50 mg Oral Daily PRN   • traZODone (DESYREL) tablet 50 mg  50 mg Oral HS PRN   • venlafaxine (EFFEXOR-XR) 24 hr capsule 225 mg  225 mg Oral Daily     Vital signs in last 24 hours:  Temp:  [97 2 °F (36 2 °C)-97 9 °F (36 6 °C)] 97 2 °F (36 2 °C)  HR:  [] 89  Resp:  [16] 16  BP: (104-111)/(66-68) 104/68    Laboratory results:    I have personally reviewed all pertinent laboratory/tests results    Most Recent Labs:   Lab Results   Component Value Date    WBC 7 65 01/10/2023    RBC 4 14 01/10/2023    HGB 11 7 01/10/2023    HCT 36 7 01/10/2023     01/10/2023    RDW 14 2 01/10/2023    NEUTROABS 5 15 01/10/2023    SODIUM 139 01/10/2023    K 4 0 01/10/2023     01/10/2023    CO2 29 01/10/2023    BUN 12 01/10/2023    CREATININE 0 70 01/10/2023    GLUC 117 (H) 01/10/2023    GLUF 115 (H) 12/23/2022    CALCIUM 9 4 01/10/2023    AST 19 01/10/2023    ALT 19 01/10/2023    ALKPHOS 77 01/10/2023    TP 7 0 01/10/2023    ALB 4 1 01/10/2023    TBILI 0 47 01/10/2023    CHOLESTEROL 200 (H) 12/02/2022    HDL 51 12/02/2022    TRIG 127 12/02/2022    LDLCALC 124 (H) 12/02/2022    NONHDLC 149 12/02/2022    AMMONIA 28 10/27/2017    IUX2OPWKBYHY 2 264 12/02/2022    FREET4 0 89 03/24/2016    PREGUR Negative 11/07/2022    PREGSERUM Negative 10/21/2019    HCG <2 00 04/10/2014    RPR Non-Reactive 12/07/2022    HGBA1C 5 6 12/02/2022     12/02/2022     Clozapine:   Lab Results   Component Value Date    CLOZAPINE 630 01/17/2023    NCLOZIP 522 01/17/2023       Psychiatric Review of Systems:  Behavior over the last 24 hours: improved  Sleep: improved  Appetite: normal  Medication side effects: Yes - mild tremors and involuntary movements in her face and hand   ROS: all other systems are negative    Mental Status Evaluation:    Appearance:  casually dressed, dressed appropriately, adequate grooming, looks older than stated age, overweight,  female   Behavior:  cooperative, calm   Speech:  normal rate and volume   Mood:  "good"   Affect:  appropriately reactive with adequate range   Thought Process:  goal directed, linear   Associations: intact associations   Thought Content:  chronic paranoid ideation   Perceptual Disturbances: no auditory hallucinations, no visual hallucinations, does not appear internally preoccupied and does not appear distracted    Risk Potential: Suicidal ideation - None at present  Homicidal ideation - None at present  Potential for aggression - Not at present   Sensorium:  oriented to person, place and time/date   Memory:  recent and remote memory grossly intact   Consciousness:  alert and awake   Attention/Concentration: attention span and concentration are age appropriate   Insight:  limited   Judgment: limited   Gait/Station: normal gait/station   Motor Activity: no abnormal movements         Progress Toward Goals: Slight improvement, she was able to handle a possible later discharge well and did not express paranoid ideation today  She noted improvement in her anxiety as well     Recommended Treatment:   See above for assessment and plan  Risks, benefits and possible side effects of Medications:   Risks, benefits, and possible side effects of medications explained to patient and patient verbalizes understanding  This note has been constructed using a voice recognition system  There may be translation, syntax, or grammatical errors  If you have any questions, please contact the dictating provider      Erik Carbajal MD  Psychiatry PGY-1

## 2023-01-25 NOTE — PROGRESS NOTES
01/25/23 0840   Team Meeting   Meeting Type Daily Rounds   Team Members Present   Team Members Present Physician;Nurse;   Physician Team Member [id number] Team Member [facility] Management Team Member [place]   Patient/Family Present   Patient Present No   Patient's Family Present No   PRNs yesterday  Pt guarded, suspicious  Reported some AH  Pt refusing ECT  DC pending coordination with group [place]

## 2023-01-25 NOTE — SOCIAL WORK
SW spoke with [facility]  They advised Pt's primary members were not currently in the office   Saskia Bianchi) advised she will ask them to reach out to SW directly to coordinate discharge  Opalkeena Montes indicated Pt has been calling in to speak with her as Pt typically does and Pt sounds like she is at baseline  SW received return phone call from Pt's ACT Nurse, Juan Guillory who indicated they have no concerns about Pt returning to her group home  Keven Adler stated the ACT team would prefer a discharge early next week to provide extra support with Pt returning home  SW called Pt's group home  Group Home director was unavailable and asked SW to call back in 20 minutes

## 2023-01-26 LAB
BASOPHILS # BLD AUTO: 0.03 THOUSANDS/ÂΜL (ref 0–0.1)
BASOPHILS NFR BLD AUTO: 1 % (ref 0–1)
EOSINOPHIL # BLD AUTO: 0.33 THOUSAND/ÂΜL (ref 0–0.61)
EOSINOPHIL NFR BLD AUTO: 5 % (ref 0–6)
ERYTHROCYTE [DISTWIDTH] IN BLOOD BY AUTOMATED COUNT: 13.7 % (ref 11.6–15.1)
HCT VFR BLD AUTO: 39 % (ref 34.8–46.1)
HGB BLD-MCNC: 12 G/DL (ref 11.5–15.4)
IMM GRANULOCYTES # BLD AUTO: 0.04 THOUSAND/UL (ref 0–0.2)
IMM GRANULOCYTES NFR BLD AUTO: 1 % (ref 0–2)
LYMPHOCYTES # BLD AUTO: 1.73 THOUSANDS/ÂΜL (ref 0.6–4.47)
LYMPHOCYTES NFR BLD AUTO: 27 % (ref 14–44)
MCH RBC QN AUTO: 28 PG (ref 26.8–34.3)
MCHC RBC AUTO-ENTMCNC: 30.8 G/DL (ref 31.4–37.4)
MCV RBC AUTO: 91 FL (ref 82–98)
MONOCYTES # BLD AUTO: 0.55 THOUSAND/ÂΜL (ref 0.17–1.22)
MONOCYTES NFR BLD AUTO: 9 % (ref 4–12)
NEUTROPHILS # BLD AUTO: 3.76 THOUSANDS/ÂΜL (ref 1.85–7.62)
NEUTS SEG NFR BLD AUTO: 57 % (ref 43–75)
NRBC BLD AUTO-RTO: 0 /100 WBCS
PLATELET # BLD AUTO: 281 THOUSANDS/UL (ref 149–390)
PMV BLD AUTO: 9.4 FL (ref 8.9–12.7)
RBC # BLD AUTO: 4.29 MILLION/UL (ref 3.81–5.12)
WBC # BLD AUTO: 6.44 THOUSAND/UL (ref 4.31–10.16)

## 2023-01-26 RX ADMIN — FENOFIBRATE 145 MG: 145 TABLET, COATED ORAL at 08:55

## 2023-01-26 RX ADMIN — Medication 3 MG: at 21:45

## 2023-01-26 RX ADMIN — CLOZAPINE 100 MG: 100 TABLET ORAL at 08:56

## 2023-01-26 RX ADMIN — HALOPERIDOL 15 MG: 5 TABLET ORAL at 17:33

## 2023-01-26 RX ADMIN — POLYETHYLENE GLYCOL 3350 17 G: 17 POWDER, FOR SOLUTION ORAL at 09:02

## 2023-01-26 RX ADMIN — VENLAFAXINE HYDROCHLORIDE 225 MG: 37.5 CAPSULE, EXTENDED RELEASE ORAL at 08:55

## 2023-01-26 RX ADMIN — FLUTICASONE PROPIONATE 1 SPRAY: 50 SPRAY, METERED NASAL at 09:00

## 2023-01-26 RX ADMIN — LORAZEPAM 0.5 MG: 0.5 TABLET ORAL at 08:55

## 2023-01-26 RX ADMIN — GUAIFENESIN 600 MG: 600 TABLET, EXTENDED RELEASE ORAL at 21:46

## 2023-01-26 RX ADMIN — CHOLECALCIFEROL TAB 25 MCG (1000 UNIT) 1000 UNITS: 25 TAB at 08:55

## 2023-01-26 RX ADMIN — LORAZEPAM 1 MG: 1 TABLET ORAL at 21:45

## 2023-01-26 RX ADMIN — CLOZAPINE 300 MG: 100 TABLET ORAL at 21:45

## 2023-01-26 RX ADMIN — HALOPERIDOL 12.5 MG: 1 TABLET ORAL at 08:55

## 2023-01-26 RX ADMIN — DOCUSATE SODIUM 100 MG: 100 CAPSULE, LIQUID FILLED ORAL at 17:33

## 2023-01-26 RX ADMIN — FLUTICASONE PROPIONATE 1 SPRAY: 50 SPRAY, METERED NASAL at 18:09

## 2023-01-26 RX ADMIN — METHOCARBAMOL 500 MG: 500 TABLET, FILM COATED ORAL at 13:39

## 2023-01-26 RX ADMIN — DOCUSATE SODIUM 100 MG: 100 CAPSULE, LIQUID FILLED ORAL at 08:55

## 2023-01-26 RX ADMIN — ALUMINUM HYDROXIDE, MAGNESIUM HYDROXIDE, AND SIMETHICONE 30 ML: 200; 200; 20 SUSPENSION ORAL at 21:50

## 2023-01-26 RX ADMIN — HALOPERIDOL 1 MG: 1 TABLET ORAL at 13:37

## 2023-01-26 RX ADMIN — SENNOSIDES AND DOCUSATE SODIUM 2 TABLET: 8.6; 5 TABLET ORAL at 20:58

## 2023-01-26 RX ADMIN — GUAIFENESIN 600 MG: 600 TABLET, EXTENDED RELEASE ORAL at 08:55

## 2023-01-26 NOTE — NURSING NOTE
Patient has been visible on the unit and social with select peers  She denies SI/HI and A/V hallucinations  Patient did participate in 2 groups and she conducted herself in an appropriate way    She has been med/meal compliant

## 2023-01-26 NOTE — PLAN OF CARE
Problem: Alteration in Thoughts and Perception  Goal: Treatment Goal: Gain control of psychotic behaviors/thinking, reduce/eliminate presenting symptoms and demonstrate improved reality functioning upon discharge  Outcome: Progressing  Goal: Verbalize thoughts and feelings  Description: Interventions:  - Promote a nonjudgmental and trusting relationship with the patient through active listening and therapeutic communication  - Assess patient's level of functioning, behavior and potential for risk  - Engage patient in 1 on 1 interactions  - Encourage patient to express fears, feelings, frustrations, and discuss symptoms    - [place] patient to reality, help patient recognize reality-based thinking   - Administer medications as ordered and assess for potential side effects  - Provide the patient education related to the signs and symptoms of the illness and desired effects of prescribed medications  Outcome: Progressing  Goal: Refrain from acting on delusional thinking/internal stimuli  Description: Interventions:  - Monitor patient closely, per order   - Utilize least restrictive measures   - Set reasonable limits, give positive feedback for acceptable   - Administer medications as ordered and monitor of potential side effects  Outcome: Progressing  Goal: Agree to be compliant with medication regime, as prescribed and report medication side effects  Description: Interventions:  - Offer appropriate PRN medication and supervise ingestion; conduct AIMS, as needed   Outcome: Progressing  Goal: Recognize dysfunctional thoughts, communicate reality-based thoughts at the time of discharge  Description: Interventions:  - Provide medication and psycho-education to assist patient in compliance and developing insight into his/her illness   Outcome: Progressing  Goal: Complete daily ADLs, including personal hygiene independently, as able  Description: Interventions:  - Observe, teach, and assist patient with ADLS  - Monitor and promote a balance of rest/activity, with adequate nutrition and elimination   Outcome: Progressing     Problem: Ineffective Coping  Goal: Identifies ineffective coping skills  Outcome: Progressing  Goal: Identifies healthy coping skills  Outcome: Progressing  Goal: Demonstrates healthy coping skills  Outcome: Progressing  Goal: Patient/Family participate in treatment and DC plans  Description: Interventions:  - Provide therapeutic environment  Outcome: Progressing  Goal: Patient/Family verbalizes awareness of resources  Outcome: Progressing  Goal: Understands least restrictive measures  Description: Interventions:  - Utilize least restrictive behavior  Outcome: Progressing  Goal: Free from restraint events  Description: - Utilize least restrictive measures   - Provide behavioral interventions   - Redirect inappropriate behaviors   Outcome: Progressing     Problem: Risk for Self Injury/Neglect  Goal: Treatment Goal: Remain safe during length of stay, learn and adopt new coping skills, and be free of self-injurious ideation, impulses and acts at the time of discharge  Outcome: Progressing  Goal: Refrain from harming self  Description: Interventions:  - Monitor patient closely, per order  - Develop a trusting relationship  - Supervise medication ingestion, monitor effects and side effects   Outcome: Progressing  Goal: Recognize maladaptive responses and adopt new coping mechanisms  Outcome: Progressing     Problem: Depression  Goal: Treatment Goal: Demonstrate behavioral control of depressive symptoms, verbalize feelings of improved mood/affect, and adopt new coping skills prior to discharge  Outcome: Progressing  Goal: Refrain from isolation  Description: Interventions:  - Develop a trusting relationship   - Encourage socialization   Outcome: Progressing  Goal: Refrain from self-neglect  Outcome: Progressing     Problem: Anxiety  Goal: Anxiety is at manageable level  Description: Interventions:  - Assess and monitor patient's anxiety level  - Monitor for signs and symptoms (heart palpitations, chest pain, shortness of breath, headaches, nausea, feeling jumpy, restlessness, irritable, apprehensive)  - Collaborate with interdisciplinary team and initiate plan and interventions as ordered    - [place] patient to unit/surroundings  - Explain treatment plan  - Encourage participation in care  - Encourage verbalization of concerns/fears  - Identify coping mechanisms  - Assist in developing anxiety-reducing skills  - Administer/offer alternative therapies  - Limit or eliminate stimulants  Outcome: Progressing     Problem: Risk for Violence/Aggression Toward Others  Goal: Treatment Goal: Refrain from acts of violence/aggression during length of stay, and demonstrate improved impulse control at the time of discharge  Outcome: Progressing  Goal: Refrain from destructive acts on the environment or property  Outcome: Progressing  Goal: Control angry outbursts  Description: Interventions:  - Monitor patient closely, per order  - Ensure early verbal de-escalation  - Monitor prn medication needs  - Set reasonable/therapeutic limits, outline behavioral expectations, and consequences   - Provide a non-threatening milieu, utilizing the least restrictive interventions   Outcome: Progressing  Goal: Identify appropriate positive anger management techniques  Description: Interventions:  - Offer anger management and coping skills groups   - Staff will provide positive feedback for appropriate anger control  Outcome: Progressing     Problem: DISCHARGE PLANNING - CARE MANAGEMENT  Goal: Discharge to post-acute care or home with appropriate resources  Description: INTERVENTIONS:  - Conduct assessment to determine patient/family and health care team treatment goals, and need for post-acute services based on payer coverage, community resources, and patient preferences, and barriers to discharge  - Address psychosocial, clinical, and financial barriers to discharge as identified in assessment in conjunction with the patient/family and health care team  - Arrange appropriate level of post-acute services according to patient’s   needs and preference and payer coverage in collaboration with the physician and health care team  - Communicate with and update the patient/family, physician, and health care team regarding progress on the discharge plan  - Arrange appropriate transportation to post-acute venues  Outcome: Progressing     Problem: Alteration in Thoughts and Perception  Goal: Treatment Goal: Gain control of psychotic behaviors/thinking, reduce/eliminate presenting symptoms and demonstrate improved reality functioning upon discharge  Outcome: Progressing  Goal: Verbalize thoughts and feelings  Description: Interventions:  - Promote a nonjudgmental and trusting relationship with the patient through active listening and therapeutic communication  - Assess patient's level of functioning, behavior and potential for risk  - Engage patient in 1 on 1 interactions  - Encourage patient to express fears, feelings, frustrations, and discuss symptoms    - [place] patient to reality, help patient recognize reality-based thinking   - Administer medications as ordered and assess for potential side effects  - Provide the patient education related to the signs and symptoms of the illness and desired effects of prescribed medications  Outcome: Progressing  Goal: Refrain from acting on delusional thinking/internal stimuli  Description: Interventions:  - Monitor patient closely, per order   - Utilize least restrictive measures   - Set reasonable limits, give positive feedback for acceptable   - Administer medications as ordered and monitor of potential side effects  Outcome: Progressing  Goal: Agree to be compliant with medication regime, as prescribed and report medication side effects  Description: Interventions:  - Offer appropriate PRN medication and supervise ingestion; conduct AIMS, as needed   Outcome: Progressing  Goal: Recognize dysfunctional thoughts, communicate reality-based thoughts at the time of discharge  Description: Interventions:  - Provide medication and psycho-education to assist patient in compliance and developing insight into his/her illness   Outcome: Progressing  Goal: Complete daily ADLs, including personal hygiene independently, as able  Description: Interventions:  - Observe, teach, and assist patient with ADLS  - Monitor and promote a balance of rest/activity, with adequate nutrition and elimination   Outcome: Progressing     Problem: Ineffective Coping  Goal: Identifies ineffective coping skills  Outcome: Progressing  Goal: Identifies healthy coping skills  Outcome: Progressing  Goal: Demonstrates healthy coping skills  Outcome: Progressing  Goal: Patient/Family participate in treatment and DC plans  Description: Interventions:  - Provide therapeutic environment  Outcome: Progressing  Goal: Patient/Family verbalizes awareness of resources  Outcome: Progressing  Goal: Understands least restrictive measures  Description: Interventions:  - Utilize least restrictive behavior  Outcome: Progressing  Goal: Free from restraint events  Description: - Utilize least restrictive measures   - Provide behavioral interventions   - Redirect inappropriate behaviors   Outcome: Progressing     Problem: Risk for Self Injury/Neglect  Goal: Treatment Goal: Remain safe during length of stay, learn and adopt new coping skills, and be free of self-injurious ideation, impulses and acts at the time of discharge  Outcome: Progressing  Goal: Refrain from harming self  Description: Interventions:  - Monitor patient closely, per order  - Develop a trusting relationship  - Supervise medication ingestion, monitor effects and side effects   Outcome: Progressing  Goal: Recognize maladaptive responses and adopt new coping mechanisms  Outcome: Progressing     Problem: Depression  Goal: Treatment Goal: Demonstrate behavioral control of depressive symptoms, verbalize feelings of improved mood/affect, and adopt new coping skills prior to discharge  Outcome: Progressing  Goal: Refrain from isolation  Description: Interventions:  - Develop a trusting relationship   - Encourage socialization   Outcome: Progressing  Goal: Refrain from self-neglect  Outcome: Progressing     Problem: Anxiety  Goal: Anxiety is at manageable level  Description: Interventions:  - Assess and monitor patient's anxiety level  - Monitor for signs and symptoms (heart palpitations, chest pain, shortness of breath, headaches, nausea, feeling jumpy, restlessness, irritable, apprehensive)  - Collaborate with interdisciplinary team and initiate plan and interventions as ordered    - [place] patient to unit/surroundings  - Explain treatment plan  - Encourage participation in care  - Encourage verbalization of concerns/fears  - Identify coping mechanisms  - Assist in developing anxiety-reducing skills  - Administer/offer alternative therapies  - Limit or eliminate stimulants  Outcome: Progressing     Problem: Risk for Violence/Aggression Toward Others  Goal: Treatment Goal: Refrain from acts of violence/aggression during length of stay, and demonstrate improved impulse control at the time of discharge  Outcome: Progressing  Goal: Refrain from destructive acts on the environment or property  Outcome: Progressing  Goal: Control angry outbursts  Description: Interventions:  - Monitor patient closely, per order  - Ensure early verbal de-escalation  - Monitor prn medication needs  - Set reasonable/therapeutic limits, outline behavioral expectations, and consequences   - Provide a non-threatening milieu, utilizing the least restrictive interventions   Outcome: Progressing  Goal: Identify appropriate positive anger management techniques  Description: Interventions:  - Offer anger management and coping skills groups   - Staff will provide positive feedback for appropriate anger control  Outcome: Progressing     Problem: DISCHARGE PLANNING - CARE MANAGEMENT  Goal: Discharge to post-acute care or home with appropriate resources  Description: INTERVENTIONS:  - Conduct assessment to determine patient/family and health care team treatment goals, and need for post-acute services based on payer coverage, community resources, and patient preferences, and barriers to discharge  - Address psychosocial, clinical, and financial barriers to discharge as identified in assessment in conjunction with the patient/family and health care team  - Arrange appropriate level of post-acute services according to patient’s   needs and preference and payer coverage in collaboration with the physician and health care team  - Communicate with and update the patient/family, physician, and health care team regarding progress on the discharge plan  - Arrange appropriate transportation to post-acute venues  Outcome: Progressing

## 2023-01-26 NOTE — SOCIAL WORK
SW spoke with Pt's [address] [facility] Nurse  RN confirmed she just needs Pt's medications e-prescribed to [facility] in [place] and a copy of med list sent to her  SW faxed med list to RN

## 2023-01-26 NOTE — PROGRESS NOTES
01/26/23 0803   Team Meeting   Meeting Type Daily Rounds   Team Members Present   Team Members Present Physician;Nurse;   Physician Team Member [facility] THE [facility]   [facility]   Care Management Team Member María   Patient/Family Present   Patient Present No   Patient's Family Present No   Pt requesting PRN medication for anxiety and agitation  Pt reporting olfactory hallucinations  Looking forward to discharge  Discharge pending for Monday

## 2023-01-26 NOTE — PROGRESS NOTES
01/26/23 1000   Activity/Group Checklist   Group Other (Comment)  (Group Art Therapy/Studio-Based, Creatively Challenging One's Comfort Zone)   Attendance Attended   Attendance Duration (min) 31-45  (Patient left group early)   Interactions Did not interact   Affect/Mood Appropriate   Goals Achieved Able to listen to others

## 2023-01-26 NOTE — NURSING NOTE
Pt visible intermittently throughout evening  Pt is constricted and endorses olfactory hallucinations of "smelling drugs and cigarettes" and "having some sensory hallucinations " Pt states that she generally "feels a lot better" and is looking forward to discharge  Pt requested "please put in a good word for me so I can get out of here tomorrow " Pt denies SI/HI/AH/VH

## 2023-01-26 NOTE — PROGRESS NOTES
01/25/23 1143   Team Meeting   Meeting Type Tx Team Meeting   Initial Conference Date 01/25/23   Team Members Present   Team Members Present Physician;Nurse;   Physician Team Member [facility] THE [facility]   Nursing Team Member Michael Meza Management Team Member María   Patient/Family Present   Patient Present Yes   Patient's Family Present No     Tx plan was reviewed and discussed with Pt  Pt was encouraged to attend groups  Medication was discussed with Pt  Pt signed tx plan

## 2023-01-26 NOTE — PROGRESS NOTES
01/26/23 0930   Activity/Group Checklist   Group Community meeting   Attendance Attended   Attendance Duration (min) 16-30   Interactions Interacted appropriately   Affect/Mood Appropriate   Goals Achieved Able to listen to others; Able to engage in interactions; Able to self-disclose; Able to recieve feedback

## 2023-01-26 NOTE — PROGRESS NOTES
01/26/23 1300   Activity/Group Checklist   Group   (recovery group)   Attendance Attended   Attendance Duration (min) 31-45   Interactions Did not interact   Affect/Mood Appropriate   Goals Achieved Able to listen to others

## 2023-01-26 NOTE — PROGRESS NOTES
Progress Note - Behavioral Health   Malissa Morgan 47 y o  female MRN: [id number]  Unit/Bed#: [facility] 342-02 Encounter: [id number]    Assessment/Plan   Principal Problem:    Schizoaffective disorder, bipolar type ([address] )  Active Problems:    LYNN (generalized anxiety disorder)    Post-traumatic stress disorder, chronic    Gastroesophageal reflux disease      • Continue Clozapine 100 mg qdaily and 300 mg QHS   • Continue Haldol 12 5 mg every morning and Haldol 15 mg QHS   • Continue Ativan 0 5 qAM and 1 mg QHS   • Continue Effexor 225 daily  • Continue Melatonin 3 mg QHS   • Continue to promote patient participation in therapeutic milieu  • Continue medical management by primary team  • Legal Status: 201   • Discharge disposition: Tentative plan for discharge Monday    Subjective: The patient was evaluated this morning for continuity of care and no acute distress noted throughout the evaluation  Per nursing report over the past 24 hours the patient was more visible on the unit throughout the evening  She endorses olfactory hallucinations of "smelling drugs and cigarettes" and also having some sensory hallucinations  She was preoccupied with getting discharged today, telling nursing staff "please put in a good word for me so I can get out of here tomorrow " She requested Haldol 2 mg PO for auditory hallucinations  She was cooperative with staff  She is medication and meal compliant  Today on my assessment the patient was sitting comfortably in her chair and reports her mood as "feeling great"  She describes her appetite as "okay"  She describes her sleep as "great"  She describes his energy as "about the same"  She denies suicidal and homicidal ideation  She also denies visual and auditory hallucinations   When asked about her requesting PRN Haldol she states she asked for it because she was having olfactory hallucinations and sensory hallucinations involving her taste stating "When I eat some of the food here it taste like I am eating worms or fish  I am still able to eat it because if I don't I am worried they will try to stuff me"  She states the haldol PRN will help "calm me down, help with confusion and give my mind a rest"  She did not have any signs of agitation during that time period  She was a little upset when the treatment team updated her that her discharge would likely be next Monday but she ultimately accepted this with no issue       Vitals: Reviewed, within normal limits   PRN: Haldol, Atarax, Robaxin     Current Medications:  Current Facility-Administered Medications   Medication Dose Route Frequency Provider Last Rate   • acetaminophen  650 mg Oral Q6H PRN JARED Billingsley     • acetaminophen  650 mg Oral Q4H PRN JARED Billingsley     • acetaminophen  975 mg Oral Q6H PRN JARED Billingsley     • albuterol  2 puff Inhalation Q6H PRN JARED Billingsley     • aluminum-magnesium hydroxide-simethicone  30 mL Oral Q4H PRN JARED Billingsley     • benzonatate  100 mg Oral TID PRN JARED Billingsley     • haloperidol lactate  2 5 mg Intramuscular Q6H PRN Max [address] [place], [place]      And   • LORazepam  1 mg Intramuscular Q6H PRN Max [address] [place], [place]      And   • benztropine  0 5 mg Intramuscular Q6H PRN Max [address] [place], [place]     • benztropine  1 mg Intramuscular Q4H PRN Max [address] [place], [place]     • haloperidol lactate  5 mg Intramuscular Q4H PRN Max [address] [place], [place]      And   • LORazepam  2 mg Intramuscular Q4H PRN Max [address] [place], [place]      And   • benztropine  1 mg Intramuscular Q4H PRN Max [address] [place], [place]     • benztropine  1 mg Oral Q4H PRN Max [address] [place], [place]     • calcium carbonate  500 mg Oral Daily PRN JARED Billingsley     • cholecalciferol  1,000 Units Oral Daily JARED Billingsley     • cloZAPine 100 mg Oral Daily [address], CRNP     • cloZAPine  300 mg Oral HS Miri Glover MD     • hydrOXYzine HCL  50 mg Oral Q6H PRN Max [address] [place], [place]      Or   • diphenhydrAMINE  50 mg Intramuscular Q6H PRN [address] [facility], CRNP     • docusate sodium  100 mg Oral BID JARED Robertson     • fenofibrate  145 mg Oral Daily JARED Robertson     • fluticasone  1 spray Each Nare BID [address] [facility], CRNP     • glycerin-hypromellose-  1 drop Both Eyes Q3H PRN [address] [facility], CRNP     • guaiFENesin  600 mg Oral Q12H [address], [place]     • haloperidol  0 5 mg Oral Q4H PRN Max 6/day [address], [place]     • haloperidol  1 mg Oral BID PRN [address] [facility], CRNP     • haloperidol  12 5 mg Oral QAM Miri Glover MD     • haloperidol  15 mg Oral QPM Miri Glover MD     • haloperidol  2 mg Oral Daily PRN [address] [facility], CRNP     • hydrocortisone   Topical 4x Daily PRN [address] [facility], CRNP     • hydrocortisone   Topical 4x Daily PRN [address] [facility], CRNP     • hydrOXYzine HCL  100 mg Oral Q6H PRN Max [address] [place], [place]      Or   • LORazepam  2 mg Intramuscular Q6H PRN [address] [facility], CRNP     • hydrOXYzine HCL  25 mg Oral Q6H PRN Max [address] [facility]     • LORazepam  0 5 mg Oral QAM Dougie Lawrence MD     • LORazepam  1 mg Oral HS Dougie Lawrence MD     • magnesium hydroxide  30 mL Oral Daily PRN [address] [facility], CRNP     • melatonin  3 mg Oral HS Dougie Lawrence MD     • methocarbamol  500 mg Oral Q6H PRN [address] [facility], CRNP     • nicotine  1 patch Transdermal Daily [address] [place], [place]     • nicotine polacrilex  4 mg Oral Q2H PRN [address] [facility], CRNP     • ondansetron  4 mg Oral Q6H PRN [address] [facility], CRNP     • pantoprazole  40 mg Oral Early Morning JARED Robertson     • phenol  1 spray Mouth/Throat Q2H PRN Marysol Nash PA-C     • polyethylene glycol  17 g Oral Daily Boni Pfeiffer, JARED     • propranolol  10 mg Oral Q8H PRN [place] CRKALLIE     • senna-docusate sodium  1 tablet Oral Daily PRN [place] CRNP     • senna-docusate sodium  2 tablet Oral HS Faith Everettmagda Sorto, JARED     • sorbitol  30 mL Oral Q1H PRN [place], CRNP     • SUMAtriptan  50 mg Oral Daily PRN [place] CRNP     • traZODone  50 mg Oral HS PRN [place], CRNP     • venlafaxine  225 mg Oral Daily Boni Pfeiffer, [address]          Behavioral Health Medications:   all current active meds have been reviewed, continue current psychiatric medications and current meds:   Current Facility-Administered Medications   Medication Dose Route Frequency   • acetaminophen (TYLENOL) tablet 650 mg  650 mg Oral Q6H PRN   • acetaminophen (TYLENOL) tablet 650 mg  650 mg Oral Q4H PRN   • acetaminophen (TYLENOL) tablet 975 mg  975 mg Oral Q6H PRN   • albuterol (PROVENTIL HFA,VENTOLIN HFA) inhaler 2 puff  2 puff Inhalation Q6H PRN   • aluminum-magnesium hydroxide-simethicone (MYLANTA) oral suspension 30 mL  30 mL Oral Q4H PRN   • benzonatate (TESSALON PERLES) capsule 100 mg  100 mg Oral TID PRN   • haloperidol lactate (HALDOL) injection 2 5 mg  2 5 mg Intramuscular Q6H PRN Max 4/day    And   • LORazepam (ATIVAN) injection 1 mg  1 mg Intramuscular Q6H PRN Max 4/day    And   • benztropine (COGENTIN) injection 0 5 mg  0 5 mg Intramuscular Q6H PRN Max 4/day   • benztropine (COGENTIN) injection 1 mg  1 mg Intramuscular Q4H PRN Max 6/day   • haloperidol lactate (HALDOL) injection 5 mg  5 mg Intramuscular Q4H PRN Max 4/day    And   • LORazepam (ATIVAN) injection 2 mg  2 mg Intramuscular Q4H PRN Max 4/day    And   • benztropine (COGENTIN) injection 1 mg  1 mg Intramuscular Q4H PRN Max 4/day   • benztropine (COGENTIN) tablet 1 mg  1 mg Oral Q4H PRN Max 6/day   • calcium carbonate (TUMS) chewable tablet 500 mg  500 mg Oral Daily PRN   • cholecalciferol (VITAMIN D3) tablet 1,000 Units  1,000 Units Oral Daily   • cloZAPine (CLOZARIL) tablet 100 mg  100 mg Oral Daily   • cloZAPine (CLOZARIL) tablet 300 mg  300 mg Oral HS   • hydrOXYzine HCL (ATARAX) tablet 50 mg  50 mg Oral Q6H PRN Max 4/day    Or   • diphenhydrAMINE (BENADRYL) injection 50 mg  50 mg Intramuscular Q6H PRN   • docusate sodium (COLACE) capsule 100 mg  100 mg Oral BID   • fenofibrate (TRICOR) tablet 145 mg  145 mg Oral Daily   • fluticasone (FLONASE) 50 mcg/act nasal spray 1 spray  1 spray Each Nare BID   • glycerin-hypromellose- (ARTIFICIAL TEARS) ophthalmic solution 1 drop  1 drop Both Eyes Q3H PRN   • guaiFENesin (MUCINEX) 12 hr tablet 600 mg  600 mg Oral Q12H KENNY   • haloperidol (HALDOL) tablet 0 5 mg  0 5 mg Oral Q4H PRN Max 6/day   • haloperidol (HALDOL) tablet 1 mg  1 mg Oral BID PRN   • haloperidol (HALDOL) tablet 12 5 mg  12 5 mg Oral QAM   • haloperidol (HALDOL) tablet 15 mg  15 mg Oral QPM   • haloperidol (HALDOL) tablet 2 mg  2 mg Oral Daily PRN   • hydrocortisone (ANUSOL-HC) 2 5 % rectal cream   Topical 4x Daily PRN   • hydrocortisone 1 % cream   Topical 4x Daily PRN   • hydrOXYzine HCL (ATARAX) tablet 100 mg  100 mg Oral Q6H PRN Max 4/day    Or   • LORazepam (ATIVAN) injection 2 mg  2 mg Intramuscular Q6H PRN   • hydrOXYzine HCL (ATARAX) tablet 25 mg  25 mg Oral Q6H PRN Max 4/day   • LORazepam (ATIVAN) tablet 0 5 mg  0 5 mg Oral QAM   • LORazepam (ATIVAN) tablet 1 mg  1 mg Oral HS   • magnesium hydroxide (MILK OF MAGNESIA) oral suspension 30 mL  30 mL Oral Daily PRN   • melatonin tablet 3 mg  3 mg Oral HS   • methocarbamol (ROBAXIN) tablet 500 mg  500 mg Oral Q6H PRN   • nicotine (NICODERM CQ) 21 mg/24 hr TD 24 hr patch 1 patch  1 patch Transdermal Daily   • nicotine polacrilex (NICORETTE) gum 4 mg  4 mg Oral Q2H PRN   • ondansetron (ZOFRAN-ODT) dispersible tablet 4 mg  4 mg Oral Q6H PRN   • pantoprazole (PROTONIX) EC tablet 40 mg  40 mg Oral Early Morning   • phenol (CHLORASEPTIC) 1 4 % mucosal liquid 1 spray  1 spray Mouth/Throat Q2H PRN   • polyethylene glycol (MIRALAX) packet 17 g  17 g Oral Daily   • propranolol (INDERAL) tablet 10 mg  10 mg Oral Q8H PRN   • senna-docusate sodium (SENOKOT S) 8 6-50 mg per tablet 1 tablet  1 tablet Oral Daily PRN   • senna-docusate sodium (SENOKOT S) 8 6-50 mg per tablet 2 tablet  2 tablet Oral HS   • sorbitol 70 % solution 30 mL  30 mL Oral Q1H PRN   • SUMAtriptan (IMITREX) tablet 50 mg  50 mg Oral Daily PRN   • traZODone (DESYREL) tablet 50 mg  50 mg Oral HS PRN   • venlafaxine (EFFEXOR-XR) 24 hr capsule 225 mg  225 mg Oral Daily     Vital signs in last 24 hours:  Temp:  [97 2 °F (36 2 °C)-97 4 °F (36 3 °C)] 97 4 °F (36 3 °C)  HR:  [] 70  Resp:  [16] 16  BP: (112-117)/(60-74) 112/72    Laboratory results:    I have personally reviewed all pertinent laboratory/tests results    Most Recent Labs:   Lab Results   Component Value Date    WBC 6 44 01/26/2023    RBC 4 29 01/26/2023    HGB 12 0 01/26/2023    HCT 39 0 01/26/2023     01/26/2023    RDW 13 7 01/26/2023    NEUTROABS 3 76 01/26/2023    SODIUM 139 01/10/2023    K 4 0 01/10/2023     01/10/2023    CO2 29 01/10/2023    BUN 12 01/10/2023    CREATININE 0 70 01/10/2023    GLUC 117 (H) 01/10/2023    GLUF 115 (H) 12/23/2022    CALCIUM 9 4 01/10/2023    AST 19 01/10/2023    ALT 19 01/10/2023    ALKPHOS 77 01/10/2023    TP 7 0 01/10/2023    ALB 4 1 01/10/2023    TBILI 0 47 01/10/2023    CHOLESTEROL 200 (H) 12/02/2022    HDL 51 12/02/2022    TRIG 127 12/02/2022    LDLCALC 124 (H) 12/02/2022    NONHDLC 149 12/02/2022    AMMONIA 28 10/27/2017    CPC0NVMSQFZX 2 264 12/02/2022    FREET4 0 89 03/24/2016    PREGUR Negative 11/07/2022    PREGSERUM Negative 10/21/2019    HCG <2 00 04/10/2014    RPR Non-Reactive 12/07/2022    HGBA1C 5 6 12/02/2022     12/02/2022     Clozapine:   Lab Results   Component Value Date CLOZAPINE 630 01/17/2023    NCLOZIP 522 01/17/2023       Psychiatric Review of Systems:  Behavior over the last 24 hours: improving  Sleep: normal  Appetite: normal  Medication side effects: No   ROS: all other systems are negative    Mental Status Evaluation:    Appearance:  casually dressed, dressed appropriately, adequate grooming, looks older than stated age, overweight,  female   Behavior:  pleasant, cooperative, calm   Speech:  normal rate and volume   Mood:  "feeling great"   Affect:  appropriately reactive with adequate range   Thought Process:  goal directed, linear   Associations: intact associations   Thought Content:  chronic paranoid ideation   Perceptual Disturbances: no auditory hallucinations, no visual hallucinations, does not appear internally preoccupied and does not appear distracted    Risk Potential: Suicidal ideation - None at present  Homicidal ideation - None at present  Potential for aggression - Not at present   Sensorium:  oriented to person, place and time/date   Memory:  recent and remote memory grossly intact   Consciousness:  alert and awake   Attention/Concentration: attention span and concentration are age appropriate   Insight:  limited   Judgment: limited   Gait/Station: normal gait/station   Motor Activity: no abnormal movements         Progress Toward Goals: Slight improvement, she was able to handle a later discharge well  She is stable on her current medication regimen and is able to appropriately request PRNs to help with her hallucinations  Recommended Treatment:   See above for assessment and plan  Risks, benefits and possible side effects of Medications:   Risks, benefits, and possible side effects of medications explained to patient and patient verbalizes understanding  This note has been constructed using a voice recognition system  There may be translation, syntax, or grammatical errors   If you have any questions, please contact the dictating provider      Santy Ramon MD  Psychiatry PGY-1

## 2023-01-26 NOTE — SOCIAL WORK
SETH called Pt's group home and was advised [facility] director is in a meeting currently  SETH was asked to call back in an hour  SETH spoke with [facility] staff to advise of D/c Monday  Staff stated they do not anticipate any problem with this but again asked SETH to call back in one hour to speak with [facility] Director regarding any documentation or information needed from hospital  Staff requested medication be e-prescribed to [facility] in [place]

## 2023-01-26 NOTE — SOCIAL WORK
SETH attempted to call Pt's group home twice more  SETH was advised [facility] Director is not available at the moment  SETH will continue to follow up with [facility] to discuss Pt's d/c on Monday

## 2023-01-27 PROBLEM — G47.00 INSOMNIA: Status: ACTIVE | Noted: 2023-01-27

## 2023-01-27 RX ORDER — HALOPERIDOL 1 MG/1
1 TABLET ORAL 2 TIMES DAILY PRN
Qty: 30 TABLET | Refills: 1 | Status: SHIPPED | OUTPATIENT
Start: 2023-01-27 | End: 2023-02-26

## 2023-01-27 RX ORDER — HALOPERIDOL 5 MG/1
TABLET ORAL
Qty: 165 TABLET | Refills: 1 | Status: SHIPPED | OUTPATIENT
Start: 2023-01-27

## 2023-01-27 RX ORDER — VENLAFAXINE HYDROCHLORIDE 75 MG/1
225 CAPSULE, EXTENDED RELEASE ORAL DAILY
Qty: 90 CAPSULE | Refills: 0 | Status: SHIPPED | OUTPATIENT
Start: 2023-01-28 | End: 2023-02-27

## 2023-01-27 RX ORDER — CLOZAPINE 100 MG/1
TABLET ORAL
Qty: 120 TABLET | Refills: 1 | Status: SHIPPED | OUTPATIENT
Start: 2023-01-27

## 2023-01-27 RX ORDER — LANOLIN ALCOHOL/MO/W.PET/CERES
3 CREAM (GRAM) TOPICAL
Qty: 30 TABLET | Refills: 1 | Status: SHIPPED | OUTPATIENT
Start: 2023-01-27 | End: 2023-02-26

## 2023-01-27 RX ORDER — METHOCARBAMOL 500 MG/1
500 TABLET, FILM COATED ORAL EVERY 6 HOURS PRN
Qty: 90 TABLET | Refills: 1 | Status: SHIPPED | OUTPATIENT
Start: 2023-01-27 | End: 2023-02-26

## 2023-01-27 RX ORDER — LORAZEPAM 1 MG/1
TABLET ORAL
Qty: 45 TABLET | Refills: 0 | Status: SHIPPED | OUTPATIENT
Start: 2023-01-27

## 2023-01-27 RX ADMIN — FENOFIBRATE 145 MG: 145 TABLET, COATED ORAL at 08:53

## 2023-01-27 RX ADMIN — LORAZEPAM 0.5 MG: 0.5 TABLET ORAL at 08:54

## 2023-01-27 RX ADMIN — SENNOSIDES AND DOCUSATE SODIUM 2 TABLET: 8.6; 5 TABLET ORAL at 21:18

## 2023-01-27 RX ADMIN — ALUMINUM HYDROXIDE, MAGNESIUM HYDROXIDE, AND SIMETHICONE 30 ML: 200; 200; 20 SUSPENSION ORAL at 17:33

## 2023-01-27 RX ADMIN — GLYCERIN, HYPROMELLOSE, POLYETHYLENE GLYCOL 1 DROP: .2; .2; 1 LIQUID OPHTHALMIC at 14:56

## 2023-01-27 RX ADMIN — CLOZAPINE 100 MG: 100 TABLET ORAL at 08:54

## 2023-01-27 RX ADMIN — FLUTICASONE PROPIONATE 1 SPRAY: 50 SPRAY, METERED NASAL at 17:30

## 2023-01-27 RX ADMIN — HALOPERIDOL 12.5 MG: 1 TABLET ORAL at 08:52

## 2023-01-27 RX ADMIN — METHOCARBAMOL 500 MG: 500 TABLET, FILM COATED ORAL at 16:21

## 2023-01-27 RX ADMIN — DOCUSATE SODIUM 100 MG: 100 CAPSULE, LIQUID FILLED ORAL at 17:30

## 2023-01-27 RX ADMIN — GUAIFENESIN 600 MG: 600 TABLET, EXTENDED RELEASE ORAL at 08:52

## 2023-01-27 RX ADMIN — GUAIFENESIN 600 MG: 600 TABLET, EXTENDED RELEASE ORAL at 21:18

## 2023-01-27 RX ADMIN — LORAZEPAM 1 MG: 1 TABLET ORAL at 21:18

## 2023-01-27 RX ADMIN — FLUTICASONE PROPIONATE 1 SPRAY: 50 SPRAY, METERED NASAL at 09:00

## 2023-01-27 RX ADMIN — Medication 3 MG: at 21:19

## 2023-01-27 RX ADMIN — PANTOPRAZOLE SODIUM 40 MG: 40 TABLET, DELAYED RELEASE ORAL at 05:55

## 2023-01-27 RX ADMIN — NICOTINE 1 PATCH: 21 PATCH, EXTENDED RELEASE TRANSDERMAL at 08:54

## 2023-01-27 RX ADMIN — POLYETHYLENE GLYCOL 3350 17 G: 17 POWDER, FOR SOLUTION ORAL at 08:54

## 2023-01-27 RX ADMIN — CLOZAPINE 300 MG: 100 TABLET ORAL at 21:18

## 2023-01-27 RX ADMIN — HALOPERIDOL 15 MG: 5 TABLET ORAL at 17:30

## 2023-01-27 RX ADMIN — ALUMINUM HYDROXIDE, MAGNESIUM HYDROXIDE, AND SIMETHICONE 30 ML: 200; 200; 20 SUSPENSION ORAL at 10:45

## 2023-01-27 RX ADMIN — VENLAFAXINE HYDROCHLORIDE 225 MG: 37.5 CAPSULE, EXTENDED RELEASE ORAL at 08:53

## 2023-01-27 RX ADMIN — CHOLECALCIFEROL TAB 25 MCG (1000 UNIT) 1000 UNITS: 25 TAB at 08:58

## 2023-01-27 RX ADMIN — DOCUSATE SODIUM 100 MG: 100 CAPSULE, LIQUID FILLED ORAL at 08:52

## 2023-01-27 NOTE — PROGRESS NOTES
01/27/23 1000   Activity/Group Checklist   Group Other (Comment)  (OPEN STUDIO Art Therapy/Social Group)   Attendance Attended   Attendance Duration (min) 46-60   Interactions Did not interact   Affect/Mood Appropriate   Goals Achieved Able to listen to others; Able to engage in interactions

## 2023-01-27 NOTE — NURSING NOTE
Patient is cooperative and visible on unit  Medication and meal compliant  Denies SI, HI, A/V hallucinations on assessment although requesting multiple PRN medications for heartburn, Hemorrhoid flare up, and dry eyes throughout the shift  Pleasant and cooperative

## 2023-01-27 NOTE — NURSING NOTE
Patient requested Robaxin for 7/10 generalized pain  PRN robaxin administered and effective on reassessment, reporting 4/10 pain "I feel a lot better

## 2023-01-27 NOTE — NURSING NOTE
Pt mostly isolated to room for evening shift, Pt is pleasant, flat, cooperative upon approach  Pt denies any psych SS, denies SI/HI/AH/VH or any of pt's previously-reported olfactory or sensory hallucinations  Pt c/o indigestion for which mylanta was given; effective  Pt also educated to stay upright for a half hour after meals and meds  Pt agreeable

## 2023-01-27 NOTE — PROGRESS NOTES
01/27/23 0809   Team Meeting   Meeting Type Daily Rounds   Team Members Present   Team Members Present Physician;Nurse;   Physician Team Member [facility]   [facility]   Care Management Team Member María   Patient/Family Present   Patient Present No   Patient's Family Present No   Pt pleasant and cooperative  Denying symptoms  Discharge Monday

## 2023-01-27 NOTE — DISCHARGE INSTR - APPOINTMENTS
Laura Hobbs or Mayi, our Rossy and Vikas, will be calling you after your discharge, on the phone number that you provided  They will be available as an additional support, if needed  If you wish to speak with one of them, you may contact Jennifer Johnson at [phone] or Marcia Richards at [phone]

## 2023-01-27 NOTE — SOCIAL WORK
SETH spoke to Jonnathan perea from Pt's ACT team  Jonnathan perea confirmed ACT team member Gabby Manzano will pick Pt up for discharge on Monday at 1100

## 2023-01-27 NOTE — DISCHARGE INSTR - OTHER ORDERS
Crisis Information  [address] [place]  [phone]    [employer] Toll Free: [phone]     When you need someone to listen, the Camron Perez is available for 16 hours a day, 7 days a week, from the time of 7-10am and 2pm-2am   It is not available from the hours of 2am-6am and 10am-2pm  A representative can be reached at [phone]  Outpatient Drug & Alcohol Treatment   [facility] currently operates an outpatient treatment unit:  [facility] in [place], [place] as a functional unit of the [place]  The Outpatient treatment units are licensed by the [place] Department of Drug & Alcohol Programs to provide individual and group counseling for those with substance abuse and dependency problems  The clinical staff is experienced in a variety of therapeutic modalities and provides treatment that is individualized to meet the particular needs of each person  These units are drug-free treatment programs  [facility] accepts most major healthcare insurance coverage plans, [place] Medical Assistance and in those cases where the consumer has no third party healthcare coverage, a liberal sliding fee schedule is utilized  The length of service and type of outpatient service provided is based on the results of the Level of Care Assessment            There are currently three treatment protocols available: Outpatient  Intensive Outpatient  Contracted services for Partial Hospitalization  Therapy is provided in both Individual and Group counseling formats  The Outpatient department offers individual counseling for the family members of substance abusers to address co-dependent and enabling behaviors      Outpatient treatment services in BEHAVIORAL MEDICINE AT [facility] [place] are purchased through a fee-for-service subcontract with:  [place] [facility]  [facility], [address]   Phone: [phone], [address] [facility]  [place] [address]  [place], [address]   New Admissions [phone]  Local office [phone]    Outpatient treatment services in [facility] are purchased through fee-for-service subcontracts with:  [address]  [address]  Phone: [phone], [address]  Phone: [address] [facility] [phone] / [address] [phone]  Outpatient treatment services are also available to [facility] residents in our [facility]

## 2023-01-27 NOTE — PROGRESS NOTES
01/27/23 0930   Activity/Group Checklist   Group Community meeting   Attendance Attended   Attendance Duration (min) 16-30   Interactions Interacted appropriately   Affect/Mood Appropriate   Goals Achieved Able to listen to others; Able to engage in interactions; Able to self-disclose; Able to recieve feedback

## 2023-01-27 NOTE — PROGRESS NOTES
Progress Note - Behavioral Health   Kellen Tolentino 47 y o  female MRN: [id number]  Unit/Bed#: [facility] 342-02 Encounter: [id number]    Assessment/Plan   Principal Problem:    Schizoaffective disorder, bipolar type ([address] )  Active Problems:    LYNN (generalized anxiety disorder)    Post-traumatic stress disorder, chronic    Gastroesophageal reflux disease      • Continue Clozapine 100 mg qdaily and 300 mg QHS   • Continue Haldol 12 5 mg every morning and Haldol 15 mg QHS   • Continue Ativan 0 5 qAM and 1 mg QHS   • Continue Effexor 225 daily  • Continue Melatonin 3 mg QHS   • Continue to promote patient participation in therapeutic milieu  • Continue medical management by primary team  • Legal Status: 201   • Discharge disposition: Tentative plan for discharge Monday    Subjective: The patient was evaluated this morning for continuity of care and no acute distress noted throughout the evaluation  Per nursing report over the past 24 hours the patient was visible on the unit throughout the evening  She did not endorses prior olfactory hallucinations or sensory hallucinations yesterday  She participated in two groups yesterday and was appropriate during the groups  Later in the evening she was mostly isolated to her room, and complained of indigestion and was given PRN mylanta and it was effective  She is medication and meal compliant  Today on my assessment the patient was sitting comfortably in her chair and reports her mood as "okay"  She describes her appetite as "about the same"  She describes her sleep as "good"  She describes his energy as "about the same"  She denies suicidal and homicidal ideation  She also denies visual and auditory hallucinations  When asked about her requesting PRN Haldol she states she asked for it because she was having olfactory hallucinations and tactile hallucinations involving her sense of smell more this time stating "I smell drugs around me and I have been experiencing this since 2013"   She states the haldol PRN helps and she would like to have it when she is discharged  She is looking forward to discharge on Monday        Vitals: Reviewed, within normal limits   PRN: Haldol, Mylanta, Robaxin     Current Medications:  Current Facility-Administered Medications   Medication Dose Route Frequency Provider Last Rate   • acetaminophen  650 mg Oral Q6H PRN Bobbette Pillion, CRNP     • acetaminophen  650 mg Oral Q4H PRN Bobbette Pillion, CRNP     • acetaminophen  975 mg Oral Q6H PRN Bobbette Pillion, CRNP     • albuterol  2 puff Inhalation Q6H PRN Bobbette Pillion, CRNP     • aluminum-magnesium hydroxide-simethicone  30 mL Oral Q4H PRN Bobbette Pillion, CRNP     • benzonatate  100 mg Oral TID PRN Bobbette Pillion, CRNP     • haloperidol lactate  2 5 mg Intramuscular Q6H PRN Max [address] [facility], [address]      And   • LORazepam  1 mg Intramuscular Q6H PRN Max [address] [facility], [address]      And   • benztropine  0 5 mg Intramuscular Q6H PRN Max [address] [facility], [address]     • benztropine  1 mg Intramuscular Q4H PRN Max [address] [facility], [address]     • haloperidol lactate  5 mg Intramuscular Q4H PRN Max [address] [facility], [address]      And   • LORazepam  2 mg Intramuscular Q4H PRN Max [address] [facility], [address]      And   • benztropine  1 mg Intramuscular Q4H PRN Max [address] [facility], [address]     • benztropine  1 mg Oral Q4H PRN Max [address] [facility], [address]     • calcium carbonate  500 mg Oral Daily PRN Bobbette Pillion, CRNP     • cholecalciferol  1,000 Units Oral Daily Bobbette Pillion, [address]     • cloZAPine  100 mg Oral Daily [facility] [place], [address]     • cloZAPine  300 mg Oral HS Krys Wolfe MD     • hydrOXYzine HCL  50 mg Oral Q6H PRN Max [address] [facility], [address]      Or   • diphenhydrAMINE  50 mg Intramuscular Q6H PRN Bobbette Pillion, RENATANP     • docusate sodium  100 mg Oral BID JARED Bateman     • fenofibrate  145 mg Oral Daily [facility], [address]     • fluticasone  1 spray Each Nare BID Eduard Jack, [address]     • glycerin-hypromellose-  1 drop Both Eyes Q3H PRN JARED Bateman     • guaiFENesin  600 mg Oral Q12H [address] [facility], [address]     • haloperidol  0 5 mg Oral Q4H PRN Max [address], [address]     • haloperidol  1 mg Oral BID PRN JARED Bateman     • haloperidol  12 5 mg Oral QAM Zurdo Swain MD     • haloperidol  15 mg Oral QPM Zurdo Swain MD     • haloperidol  2 mg Oral Daily PRN JARED Bateman     • hydrocortisone   Topical 4x Daily PRN JARED Bateman     • hydrocortisone   Topical 4x Daily PRN JARED Bateman     • hydrOXYzine HCL  100 mg Oral Q6H PRN Max [address], [address]      Or   • LORazepam  2 mg Intramuscular Q6H PRN JARED Bateman     • hydrOXYzine HCL  25 mg Oral Q6H PRN Max 4/day JARED Bateman     • LORazepam  0 5 mg Oral QAM Leo Allan MD     • LORazepam  1 mg Oral HS Leo Allan MD     • magnesium hydroxide  30 mL Oral Daily PRN JARED Bateman     • melatonin  3 mg Oral HS Leo Allan MD     • methocarbamol  500 mg Oral Q6H PRN JARED Bateman     • nicotine  1 patch Transdermal Daily Eduard Santiago, [address]     • nicotine polacrilex  4 mg Oral Q2H PRN JARED Bateman     • ondansetron  4 mg Oral Q6H PRN JARED Bateman     • pantoprazole  40 mg Oral Early Morning JARED Robertson     • phenol  1 spray Mouth/Throat Q2H PRN Elsa Ferrara PA-C     • polyethylene glycol  17 g Oral Daily JARED Robertson     • propranolol  10 mg Oral Q8H PRN JARED Bateman     • senna-docusate sodium  1 tablet Oral Daily PRN JARED Bateman     • senna-docusate sodium  2 tablet Oral HS JARED Jacobo     • sorbitol  30 mL Oral Q1H PRN Gini Crigler, CRNP     • SUMAtriptan  50 mg Oral Daily PRN Gini Crigler, CRNP     • traZODone  50 mg Oral HS PRN Gini Crigler, CRNP     • venlafaxine  225 mg [facility] Daily [facility]         Behavioral Health Medications:   all current active meds have been reviewed, continue current psychiatric medications and current meds:   Current Facility-Administered Medications   Medication Dose Route Frequency   • acetaminophen (TYLENOL) tablet 650 mg  650 mg Oral Q6H PRN   • acetaminophen (TYLENOL) tablet 650 mg  650 mg Oral Q4H PRN   • acetaminophen (TYLENOL) tablet 975 mg  975 mg Oral Q6H PRN   • albuterol (PROVENTIL HFA,VENTOLIN HFA) inhaler 2 puff  2 puff Inhalation Q6H PRN   • aluminum-magnesium hydroxide-simethicone (MYLANTA) oral suspension 30 mL  30 mL Oral Q4H PRN   • benzonatate (TESSALON PERLES) capsule 100 mg  100 mg Oral TID PRN   • haloperidol lactate (HALDOL) injection 2 5 mg  2 5 mg Intramuscular Q6H PRN Max 4/day    And   • LORazepam (ATIVAN) injection 1 mg  1 mg Intramuscular Q6H PRN Max 4/day    And   • benztropine (COGENTIN) injection 0 5 mg  0 5 mg Intramuscular Q6H PRN Max 4/day   • benztropine (COGENTIN) injection 1 mg  1 mg Intramuscular Q4H PRN Max 6/day   • haloperidol lactate (HALDOL) injection 5 mg  5 mg Intramuscular Q4H PRN Max 4/day    And   • LORazepam (ATIVAN) injection 2 mg  2 mg Intramuscular Q4H PRN Max 4/day    And   • benztropine (COGENTIN) injection 1 mg  1 mg Intramuscular Q4H PRN Max 4/day   • benztropine (COGENTIN) tablet 1 mg  1 mg Oral Q4H PRN Max 6/day   • calcium carbonate (TUMS) chewable tablet 500 mg  500 mg Oral Daily PRN   • cholecalciferol (VITAMIN D3) tablet 1,000 Units  1,000 Units Oral Daily   • cloZAPine (CLOZARIL) tablet 100 mg  100 mg Oral Daily   • cloZAPine (CLOZARIL) tablet 300 mg  300 mg Oral HS   • hydrOXYzine HCL (ATARAX) tablet 50 mg  50 mg Oral Q6H PRN Max 4/day    Or   • diphenhydrAMINE (BENADRYL) injection 50 mg  50 mg Intramuscular Q6H PRN   • docusate sodium (COLACE) capsule 100 mg  100 mg Oral BID   • fenofibrate (TRICOR) tablet 145 mg  145 mg Oral Daily   • fluticasone (FLONASE) 50 mcg/act nasal spray 1 spray  1 spray Each Nare BID   • glycerin-hypromellose- (ARTIFICIAL TEARS) ophthalmic solution 1 drop  1 drop Both Eyes Q3H PRN   • guaiFENesin (MUCINEX) 12 hr tablet 600 mg  600 mg Oral Q12H KENNY   • haloperidol (HALDOL) tablet 0 5 mg  0 5 mg Oral Q4H PRN Max 6/day   • haloperidol (HALDOL) tablet 1 mg  1 mg Oral BID PRN   • haloperidol (HALDOL) tablet 12 5 mg  12 5 mg Oral QAM   • haloperidol (HALDOL) tablet 15 mg  15 mg Oral QPM   • haloperidol (HALDOL) tablet 2 mg  2 mg Oral Daily PRN   • hydrocortisone (ANUSOL-HC) 2 5 % rectal cream   Topical 4x Daily PRN   • hydrocortisone 1 % cream   Topical 4x Daily PRN   • hydrOXYzine HCL (ATARAX) tablet 100 mg  100 mg Oral Q6H PRN Max 4/day    Or   • LORazepam (ATIVAN) injection 2 mg  2 mg Intramuscular Q6H PRN   • hydrOXYzine HCL (ATARAX) tablet 25 mg  25 mg Oral Q6H PRN Max 4/day   • LORazepam (ATIVAN) tablet 0 5 mg  0 5 mg Oral QAM   • LORazepam (ATIVAN) tablet 1 mg  1 mg Oral HS   • magnesium hydroxide (MILK OF MAGNESIA) oral suspension 30 mL  30 mL Oral Daily PRN   • melatonin tablet 3 mg  3 mg Oral HS   • methocarbamol (ROBAXIN) tablet 500 mg  500 mg Oral Q6H PRN   • nicotine (NICODERM CQ) 21 mg/24 hr TD 24 hr patch 1 patch  1 patch Transdermal Daily   • nicotine polacrilex (NICORETTE) gum 4 mg  4 mg Oral Q2H PRN   • ondansetron (ZOFRAN-ODT) dispersible tablet 4 mg  4 mg Oral Q6H PRN   • pantoprazole (PROTONIX) EC tablet 40 mg  40 mg Oral Early Morning   • phenol (CHLORASEPTIC) 1 4 % mucosal liquid 1 spray  1 spray Mouth/Throat Q2H PRN   • polyethylene glycol (MIRALAX) packet 17 g  17 g Oral Daily   • propranolol (INDERAL) tablet 10 mg  10 mg Oral Q8H PRN   • senna-docusate sodium (SENOKOT S) 8 6-50 mg per tablet 1 tablet  1 tablet Oral Daily PRN   • senna-docusate sodium (SENOKOT S) 8 6-50 mg per tablet 2 tablet  2 tablet Oral HS   • sorbitol 70 % solution 30 mL  30 mL Oral Q1H PRN   • SUMAtriptan (IMITREX) tablet 50 mg  50 mg Oral Daily PRN   • traZODone (DESYREL) tablet 50 mg  50 mg Oral HS PRN   • venlafaxine (EFFEXOR-XR) 24 hr capsule 225 mg  225 mg Oral Daily     Vital signs in last 24 hours:  Temp:  [97 1 °F (36 2 °C)-97 4 °F (36 3 °C)] 97 1 °F (36 2 °C)  HR:  [] 103  Resp:  [16] 16  BP: (110-117)/(60-72) 110/63    Laboratory results:    I have personally reviewed all pertinent laboratory/tests results    Most Recent Labs:   Lab Results   Component Value Date    WBC 6 44 01/26/2023    RBC 4 29 01/26/2023    HGB 12 0 01/26/2023    HCT 39 0 01/26/2023     01/26/2023    RDW 13 7 01/26/2023    NEUTROABS 3 76 01/26/2023    SODIUM 139 01/10/2023    K 4 0 01/10/2023     01/10/2023    CO2 29 01/10/2023    BUN 12 01/10/2023    CREATININE 0 70 01/10/2023    GLUC 117 (H) 01/10/2023    GLUF 115 (H) 12/23/2022    CALCIUM 9 4 01/10/2023    AST 19 01/10/2023    ALT 19 01/10/2023    ALKPHOS 77 01/10/2023    TP 7 0 01/10/2023    ALB 4 1 01/10/2023    TBILI 0 47 01/10/2023    CHOLESTEROL 200 (H) 12/02/2022    HDL 51 12/02/2022    TRIG 127 12/02/2022    LDLCALC 124 (H) 12/02/2022    NONHDLC 149 12/02/2022    AMMONIA 28 10/27/2017    ECR3YUDKHSVS 2 264 12/02/2022    FREET4 0 89 03/24/2016    PREGUR Negative 11/07/2022    PREGSERUM Negative 10/21/2019    HCG <2 00 04/10/2014    RPR Non-Reactive 12/07/2022    HGBA1C 5 6 12/02/2022     12/02/2022     Clozapine:   Lab Results   Component Value Date    CLOZAPINE 630 01/17/2023    NCLOZIP 522 01/17/2023       Psychiatric Review of Systems:  Behavior over the last 24 hours: improving  Sleep: normal  Appetite: normal  Medication side effects: No   ROS: all other systems are negative    Mental Status Evaluation:    Appearance:  casually dressed, dressed appropriately, adequate grooming, looks older than stated age, overweight,  female   Behavior:  pleasant, cooperative, calm   Speech:  normal rate and volume   Mood:  "okay"   Affect:  appropriately reactive with adequate range   Thought Process:  goal directed, linear   Associations: intact associations   Thought Content:  chronic paranoid ideation   Perceptual Disturbances: no auditory hallucinations, no visual hallucinations, does not appear internally preoccupied and does not appear distracted    Risk Potential: Suicidal ideation - None at present  Homicidal ideation - None at present  Potential for aggression - Not at present   Sensorium:  oriented to person, place and time/date   Memory:  recent and remote memory grossly intact   Consciousness:  alert and awake   Attention/Concentration: attention span and concentration are age appropriate   Insight:  limited   Judgment: limited   Gait/Station: normal gait/station   Motor Activity: no abnormal movements         Progress Toward Goals: Slight improvement    Recommended Treatment:   See above for assessment and plan  Risks, benefits and possible side effects of Medications:   Risks, benefits, and possible side effects of medications explained to patient and patient verbalizes understanding  This note has been constructed using a voice recognition system  There may be translation, syntax, or grammatical errors  If you have any questions, please contact the dictating provider      Alexx Singh MD  Psychiatry PGY-1

## 2023-01-27 NOTE — PLAN OF CARE
Problem: Alteration in Thoughts and Perception  Goal: Treatment Goal: Gain control of psychotic behaviors/thinking, reduce/eliminate presenting symptoms and demonstrate improved reality functioning upon discharge  Outcome: Progressing  Goal: Verbalize thoughts and feelings  Description: Interventions:  - Promote a nonjudgmental and trusting relationship with the patient through active listening and therapeutic communication  - Assess patient's level of functioning, behavior and potential for risk  - Engage patient in 1 on 1 interactions  - Encourage patient to express fears, feelings, frustrations, and discuss symptoms    - [place] patient to reality, help patient recognize reality-based thinking   - Administer medications as ordered and assess for potential side effects  - Provide the patient education related to the signs and symptoms of the illness and desired effects of prescribed medications  Outcome: Progressing  Goal: Refrain from acting on delusional thinking/internal stimuli  Description: Interventions:  - Monitor patient closely, per order   - Utilize least restrictive measures   - Set reasonable limits, give positive feedback for acceptable   - Administer medications as ordered and monitor of potential side effects  Outcome: Progressing  Goal: Agree to be compliant with medication regime, as prescribed and report medication side effects  Description: Interventions:  - Offer appropriate PRN medication and supervise ingestion; conduct AIMS, as needed   Outcome: Progressing  Goal: Recognize dysfunctional thoughts, communicate reality-based thoughts at the time of discharge  Description: Interventions:  - Provide medication and psycho-education to assist patient in compliance and developing insight into his/her illness   Outcome: Progressing  Goal: Complete daily ADLs, including personal hygiene independently, as able  Description: Interventions:  - Observe, teach, and assist patient with ADLS  - Monitor and promote a balance of rest/activity, with adequate nutrition and elimination   Outcome: Progressing     Problem: Ineffective Coping  Goal: Identifies ineffective coping skills  Outcome: Progressing  Goal: Identifies healthy coping skills  Outcome: Progressing  Goal: Demonstrates healthy coping skills  Outcome: Progressing  Goal: Patient/Family participate in treatment and DC plans  Description: Interventions:  - Provide therapeutic environment  Outcome: Progressing  Goal: Patient/Family verbalizes awareness of resources  Outcome: Progressing  Goal: Understands least restrictive measures  Description: Interventions:  - Utilize least restrictive behavior  Outcome: Progressing  Goal: Free from restraint events  Description: - Utilize least restrictive measures   - Provide behavioral interventions   - Redirect inappropriate behaviors   Outcome: Progressing     Problem: Risk for Self Injury/Neglect  Goal: Treatment Goal: Remain safe during length of stay, learn and adopt new coping skills, and be free of self-injurious ideation, impulses and acts at the time of discharge  Outcome: Progressing  Goal: Refrain from harming self  Description: Interventions:  - Monitor patient closely, per order  - Develop a trusting relationship  - Supervise medication ingestion, monitor effects and side effects   Outcome: Progressing  Goal: Recognize maladaptive responses and adopt new coping mechanisms  Outcome: Progressing     Problem: Depression  Goal: Treatment Goal: Demonstrate behavioral control of depressive symptoms, verbalize feelings of improved mood/affect, and adopt new coping skills prior to discharge  Outcome: Progressing  Goal: Refrain from isolation  Description: Interventions:  - Develop a trusting relationship   - Encourage socialization   Outcome: Progressing  Goal: Refrain from self-neglect  Outcome: Progressing     Problem: Anxiety  Goal: Anxiety is at manageable level  Description: Interventions:  - Assess and monitor patient's anxiety level  - Monitor for signs and symptoms (heart palpitations, chest pain, shortness of breath, headaches, nausea, feeling jumpy, restlessness, irritable, apprehensive)  - Collaborate with interdisciplinary team and initiate plan and interventions as ordered    - [place] patient to unit/surroundings  - Explain treatment plan  - Encourage participation in care  - Encourage verbalization of concerns/fears  - Identify coping mechanisms  - Assist in developing anxiety-reducing skills  - Administer/offer alternative therapies  - Limit or eliminate stimulants  Outcome: Progressing     Problem: Risk for Violence/Aggression Toward Others  Goal: Treatment Goal: Refrain from acts of violence/aggression during length of stay, and demonstrate improved impulse control at the time of discharge  Outcome: Progressing  Goal: Refrain from destructive acts on the environment or property  Outcome: Progressing  Goal: Control angry outbursts  Description: Interventions:  - Monitor patient closely, per order  - Ensure early verbal de-escalation  - Monitor prn medication needs  - Set reasonable/therapeutic limits, outline behavioral expectations, and consequences   - Provide a non-threatening milieu, utilizing the least restrictive interventions   Outcome: Progressing  Goal: Identify appropriate positive anger management techniques  Description: Interventions:  - Offer anger management and coping skills groups   - Staff will provide positive feedback for appropriate anger control  Outcome: Progressing     Problem: DISCHARGE PLANNING - CARE MANAGEMENT  Goal: Discharge to post-acute care or home with appropriate resources  Description: INTERVENTIONS:  - Conduct assessment to determine patient/family and health care team treatment goals, and need for post-acute services based on payer coverage, community resources, and patient preferences, and barriers to discharge  - Address psychosocial, clinical, and financial barriers to discharge as identified in assessment in conjunction with the patient/family and health care team  - Arrange appropriate level of post-acute services according to patient’s   needs and preference and payer coverage in collaboration with the physician and health care team  - Communicate with and update the patient/family, physician, and health care team regarding progress on the discharge plan  - Arrange appropriate transportation to post-acute venues  Outcome: Progressing

## 2023-01-28 RX ADMIN — LORAZEPAM 0.5 MG: 0.5 TABLET ORAL at 09:37

## 2023-01-28 RX ADMIN — ALUMINUM HYDROXIDE, MAGNESIUM HYDROXIDE, AND SIMETHICONE 30 ML: 200; 200; 20 SUSPENSION ORAL at 16:41

## 2023-01-28 RX ADMIN — SENNOSIDES AND DOCUSATE SODIUM 2 TABLET: 8.6; 5 TABLET ORAL at 21:00

## 2023-01-28 RX ADMIN — Medication 3 MG: at 21:37

## 2023-01-28 RX ADMIN — DOCUSATE SODIUM 100 MG: 100 CAPSULE, LIQUID FILLED ORAL at 17:08

## 2023-01-28 RX ADMIN — ALUMINUM HYDROXIDE, MAGNESIUM HYDROXIDE, AND SIMETHICONE 30 ML: 200; 200; 20 SUSPENSION ORAL at 22:24

## 2023-01-28 RX ADMIN — CLOZAPINE 300 MG: 100 TABLET ORAL at 21:37

## 2023-01-28 RX ADMIN — HALOPERIDOL 1 MG: 1 TABLET ORAL at 13:02

## 2023-01-28 RX ADMIN — POLYETHYLENE GLYCOL 3350 17 G: 17 POWDER, FOR SOLUTION ORAL at 09:51

## 2023-01-28 RX ADMIN — CLOZAPINE 100 MG: 100 TABLET ORAL at 09:37

## 2023-01-28 RX ADMIN — GUAIFENESIN 600 MG: 600 TABLET, EXTENDED RELEASE ORAL at 09:38

## 2023-01-28 RX ADMIN — LORAZEPAM 1 MG: 1 TABLET ORAL at 21:37

## 2023-01-28 RX ADMIN — DOCUSATE SODIUM 100 MG: 100 CAPSULE, LIQUID FILLED ORAL at 09:38

## 2023-01-28 RX ADMIN — FLUTICASONE PROPIONATE 1 SPRAY: 50 SPRAY, METERED NASAL at 09:51

## 2023-01-28 RX ADMIN — HALOPERIDOL 12.5 MG: 1 TABLET ORAL at 09:37

## 2023-01-28 RX ADMIN — METHOCARBAMOL 500 MG: 500 TABLET, FILM COATED ORAL at 13:02

## 2023-01-28 RX ADMIN — GLYCERIN, HYPROMELLOSE, POLYETHYLENE GLYCOL 1 DROP: .2; .2; 1 LIQUID OPHTHALMIC at 09:50

## 2023-01-28 RX ADMIN — FLUTICASONE PROPIONATE 1 SPRAY: 50 SPRAY, METERED NASAL at 17:08

## 2023-01-28 RX ADMIN — GUAIFENESIN 600 MG: 600 TABLET, EXTENDED RELEASE ORAL at 21:37

## 2023-01-28 RX ADMIN — VENLAFAXINE HYDROCHLORIDE 225 MG: 37.5 CAPSULE, EXTENDED RELEASE ORAL at 09:38

## 2023-01-28 RX ADMIN — FENOFIBRATE 145 MG: 145 TABLET, COATED ORAL at 09:38

## 2023-01-28 RX ADMIN — HALOPERIDOL 15 MG: 5 TABLET ORAL at 17:07

## 2023-01-28 RX ADMIN — CHOLECALCIFEROL TAB 25 MCG (1000 UNIT) 1000 UNITS: 25 TAB at 09:38

## 2023-01-28 RX ADMIN — ALUMINUM HYDROXIDE, MAGNESIUM HYDROXIDE, AND SIMETHICONE 30 ML: 200; 200; 20 SUSPENSION ORAL at 11:45

## 2023-01-28 RX ADMIN — NICOTINE 1 PATCH: 21 PATCH, EXTENDED RELEASE TRANSDERMAL at 09:39

## 2023-01-28 NOTE — PROGRESS NOTES
Progress Note - Behavioral Health     James Bhardwaj 47 y o  female MRN: [id number]   Unit/Bed#: [facility] 361-09 Encounter: [id number]    Patient was seen and evaluated for continuity of care  As per nursing report yesterday morning, patient was complaining of indigestion and heartburn and received as needed Mylanta which was effective  As per nursing report yesterday afternoon, patient was noted to be cooperative and visible on the unit  She denied any SI/HI/AVH per nursing documentation  She did report as needed medications for hemorrhoids, dry eyes, and heartburn per nursing documentation  Nursing noted that patient was pleasant and cooperative  As per case management documentation, act team member will  patient for discharge on 1/30/2023 at 11 AM   As per nighttime nursing last night, patient was noted to be isolative to her room but visible only for needs  She was observed resting in bed for most of the time  She has been medication adherent  During the interview today, patient describes her mood as "pretty good "  She states that she slept 10 hours last night  She does report some reduced overall motivation but has been observed walking periodically in the hallway  She notes doing well overall in terms of her appetite and energy level  She denies SI/HI/AVH/delusions and denies any plan or intent to harm herself or others  She continues to be medication adherent and denies side effects  She is looking forward to discharge  She agrees to continue with her current medication regimen as prescribed  She states that she last experienced auditory hallucinations 2 days ago but was unable to discuss the content  She denies command auditory hallucinations and denied that her previous auditory hallucinations were command in nature   She states that she is currently not experiencing auditory hallucinations and is able to contract for safety in the hospital   She denies any active or passive SI currently  Sleep: normal, slept 10 hours  Appetite: normal  Medication side effects: No   ROS: no complaints, all other systems are negative    Mental Status Evaluation:    Appearance:   female, dressed casually, obese, fair hygiene   Behavior:  cooperative, no PMA/slowing, guarded at times   Speech:  clear, coherent, scant   Mood:  "pretty good"   Affect:  constricted but reactive   Thought Process:  concrete at times   Associations: intact associations   Thought Content:  some paranoia   Perceptual Disturbances: no auditory hallucinations, no visual hallucinations, denies auditory hallucinations when asked, does not appear responding to internal stimuli, did not appear to be internally preoccupied, did appear distracted at times   Risk Potential: Suicidal ideation - None  Homicidal ideation - None  Potential for aggression - No   Sensorium:  oriented to person, place, time/date and situation   Memory:  recent and remote memory grossly intact   Consciousness:  alert and awake   Attention/Concentration: attention span and concentration appear shorter than expected for age   Insight:  limited   Judgment: limited   Gait/Station: normal gait/station   Motor Activity: no abnormal movements     Vital signs in last 24 hours:    Temp:  [96 8 °F (36 °C)-96 9 °F (36 1 °C)] 96 9 °F (36 1 °C)  HR:  [103-112] 103  Resp:  [16] 16  BP: ()/(67-69) 96/69    Laboratory results: I have personally reviewed all pertinent laboratory/tests results    Results from the past 24 hours: No results found for this or any previous visit (from the past 24 hour(s))    Most Recent Labs:   Lab Results   Component Value Date    WBC 6 44 01/26/2023    RBC 4 29 01/26/2023    HGB 12 0 01/26/2023    HCT 39 0 01/26/2023     01/26/2023    RDW 13 7 01/26/2023    NEUTROABS 3 76 01/26/2023    SODIUM 139 01/10/2023    K 4 0 01/10/2023     01/10/2023    CO2 29 01/10/2023    BUN 12 01/10/2023    CREATININE 0 70 01/10/2023    GLUC 117 (H) 01/10/2023    CALCIUM 9 4 01/10/2023    AST 19 01/10/2023    ALT 19 01/10/2023    ALKPHOS 77 01/10/2023    TP 7 0 01/10/2023    ALB 4 1 01/10/2023    TBILI 0 47 01/10/2023    CHOLESTEROL 200 (H) 12/02/2022    HDL 51 12/02/2022    TRIG 127 12/02/2022    LDLCALC 124 (H) 12/02/2022    NONHDLC 149 12/02/2022    AMMONIA 28 10/27/2017    HAX3YQAEMYFY 2 264 12/02/2022    FREET4 0 89 03/24/2016    PREGUR Negative 11/07/2022    PREGSERUM Negative 10/21/2019    HCG <2 00 04/10/2014    RPR Non-Reactive 12/07/2022    HGBA1C 5 6 12/02/2022     12/02/2022     Progress Toward Goals: progressing    Assessment/Plan   Principal Problem:    Schizoaffective disorder, bipolar type ([facility] [address] )  Active Problems:    LYNN (generalized anxiety disorder)    Post-traumatic stress disorder, chronic    Gastroesophageal reflux disease    Insomnia      Recommended Treatment:     Planned medication and treatment changes:     All current active medications have been reviewed  Encourage group therapy, milieu therapy and occupational therapy  Behavioral Health checks every 7 minutes    -Continue current medications as prescribed below  -D/C planned for 1/30/2023    Current Facility-Administered Medications   Medication Dose Route Frequency Provider Last Rate   • acetaminophen  650 mg Oral Q6H PRN RENATA CorreaNP     • acetaminophen  650 mg Oral Q4H PRN JARED Correa     • acetaminophen  975 mg Oral Q6H PRN JARED Correa     • albuterol  2 puff Inhalation Q6H PRN RENATA CorreaNP     • aluminum-magnesium hydroxide-simethicone  30 mL Oral Q4H PRN JARED Correa     • benzonatate  100 mg Oral TID PRN RENATA CorreaNP     • haloperidol lactate  2 5 mg Intramuscular Q6H PRN Max [address] [facility],  [address]      And   • LORazepam  1 mg Intramuscular Q6H PRN Max [address] [facility], [address]      And   • benztropine  0 5 mg Intramuscular Q6H PRN Max 4/day [facility], [address]     • benztropine  1 mg Intramuscular Q4H PRN Max [address] [facility], [address]     • haloperidol lactate  5 mg Intramuscular Q4H PRN Max [address] [facility], [address]      And   • LORazepam  2 mg Intramuscular Q4H PRN Max [address] [facility], [address]      And   • benztropine  1 mg Intramuscular Q4H PRN Max [address] [facility], [address]     • benztropine  1 mg Oral Q4H PRN Max [address] [facility], [address]     • calcium carbonate  500 mg Oral Daily PRN Bobbette Pillion, CRNP     • cholecalciferol  1,000 Units Oral Daily [facility], [address]     • cloZAPine  100 mg Oral Daily [facility], [address]     • cloZAPine  300 mg Oral HS Krys Wolfe MD     • hydrOXYzine HCL  50 mg Oral Q6H PRN Max [address] [facility], [address]      Or   • diphenhydrAMINE  50 mg Intramuscular Q6H PRN Bobbette Pillion, CRNP     • docusate sodium  100 mg Oral BID [facility] Kentrell, CRNP     • fenofibrate  145 mg Oral Daily [facility] CrockettJARED     • fluticasone  1 spray Each Nare BID Mikhailbette Pillion, CRNP     • glycerin-hypromellose-  1 drop Both Eyes Q3H PRN Bobbette Pillion, CRNP     • guaiFENesin  600 mg Oral Q12H [address], [address]     • haloperidol  0 5 mg Oral Q4H PRN Max 6/day [facility], [address]     • haloperidol  1 mg Oral BID PRN Bobbette Pillion, CRNP     • haloperidol  12 5 mg Oral QAM Krys Wolfe MD     • haloperidol  15 mg Oral QPM Krys Wolfe MD     • haloperidol  2 mg Oral Daily PRN Bobbette Pillion, CRNP     • hydrocortisone   Topical 4x Daily PRN Bobbette Pillion, CRNP     • hydrocortisone   Topical 4x Daily PRN Bobbette Pillion, CRNP     • hydrOXYzine HCL  100 mg Oral Q6H PRN Max [address] [facility], [address]      Or   • LORazepam  2 mg Intramuscular Q6H PRN JARED Motta     • hydrOXYzine HCL  25 mg Oral Q6H PRN Max 4/day [facility] JARED Benjamin     • LORazepam  0 5 mg Oral QA Alexx Singh MD     • LORazepam  1 mg Oral HS Alexx Singh MD     • magnesium hydroxide  30 mL Oral Daily PRN JARED Vázquez     • melatonin  3 mg Oral HS Alexx Singh MD     • methocarbamol  500 mg Oral Q6H PRN JARED Vázquez     • nicotine  1 patch Transdermal Daily [facility], [address]     • nicotine polacrilex  4 mg Oral Q2H PRN JARED Vázquez     • ondansetron  4 mg Oral Q6H PRN JARED Vázquez     • pantoprazole  40 mg Oral Early Morning JARED Robertson     • phenol  1 spray Mouth/Throat Q2H PRN Larry Mckeon PA-C     • polyethylene glycol  17 g Oral Daily JARED Robertson     • propranolol  10 mg Oral Q8H PRN JARED Vázquez     • senna-docusate sodium  1 tablet Oral Daily PRN JARED Vázquez     • senna-docusate sodium  2 tablet Oral HS JARED Baldwin     • sorbitol  30 mL Oral Q1H PRN JARED Vázquez     • SUMAtriptan  50 mg Oral Daily PRN JARED Vázquez     • traZODone  50 mg Oral HS PRN JARED Vázquez     • venlafaxine  225 mg Oral Daily JARED Vázquez       Risks / Benefits of Treatment:    Risks, benefits, and possible side effects of medications explained to patient  Patient has limited understanding of risks and benefits of treatment at this time, but agrees to take medications as prescribed  Counseling / Coordination of Care: Total floor / unit time spent today 20 minutes  Greater than 50% of total time was spent with the patient and / or family counseling and / or coordination of care  A description of counseling / coordination of care:  Patient's progress discussed with staff in treatment team meeting  Medications, treatment progress and treatment plan reviewed with patient    Importance of medication and treatment compliance reviewed with patient  Reassurance and supportive therapy provided  Encouraged participation in milieu and group therapy on the unit      Nayeli Perales MD 01/28/23

## 2023-01-28 NOTE — PLAN OF CARE
Problem: Alteration in Thoughts and Perception  Goal: Treatment Goal: Gain control of psychotic behaviors/thinking, reduce/eliminate presenting symptoms and demonstrate improved reality functioning upon discharge  Outcome: Progressing  Goal: Verbalize thoughts and feelings  Description: Interventions:  - Promote a nonjudgmental and trusting relationship with the patient through active listening and therapeutic communication  - Assess patient's level of functioning, behavior and potential for risk  - Engage patient in 1 on 1 interactions  - Encourage patient to express fears, feelings, frustrations, and discuss symptoms    - [place] patient to reality, help patient recognize reality-based thinking   - Administer medications as ordered and assess for potential side effects  - Provide the patient education related to the signs and symptoms of the illness and desired effects of prescribed medications  Outcome: Progressing  Goal: Refrain from acting on delusional thinking/internal stimuli  Description: Interventions:  - Monitor patient closely, per order   - Utilize least restrictive measures   - Set reasonable limits, give positive feedback for acceptable   - Administer medications as ordered and monitor of potential side effects  Outcome: Progressing  Goal: Agree to be compliant with medication regime, as prescribed and report medication side effects  Description: Interventions:  - Offer appropriate PRN medication and supervise ingestion; conduct AIMS, as needed   Outcome: Progressing  Goal: Recognize dysfunctional thoughts, communicate reality-based thoughts at the time of discharge  Description: Interventions:  - Provide medication and psycho-education to assist patient in compliance and developing insight into his/her illness   Outcome: Progressing  Goal: Complete daily ADLs, including personal hygiene independently, as able  Description: Interventions:  - Observe, teach, and assist patient with ADLS  - Monitor and promote a balance of rest/activity, with adequate nutrition and elimination   Outcome: Progressing     Problem: Ineffective Coping  Goal: Identifies ineffective coping skills  Outcome: Progressing  Goal: Identifies healthy coping skills  Outcome: Progressing  Goal: Demonstrates healthy coping skills  Outcome: Progressing  Goal: Patient/Family participate in treatment and DC plans  Description: Interventions:  - Provide therapeutic environment  Outcome: Progressing  Goal: Patient/Family verbalizes awareness of resources  Outcome: Progressing  Goal: Understands least restrictive measures  Description: Interventions:  - Utilize least restrictive behavior  Outcome: Progressing  Goal: Free from restraint events  Description: - Utilize least restrictive measures   - Provide behavioral interventions   - Redirect inappropriate behaviors   Outcome: Progressing     Problem: Risk for Self Injury/Neglect  Goal: Treatment Goal: Remain safe during length of stay, learn and adopt new coping skills, and be free of self-injurious ideation, impulses and acts at the time of discharge  Outcome: Progressing  Goal: Refrain from harming self  Description: Interventions:  - Monitor patient closely, per order  - Develop a trusting relationship  - Supervise medication ingestion, monitor effects and side effects   Outcome: Progressing  Goal: Recognize maladaptive responses and adopt new coping mechanisms  Outcome: Progressing     Problem: Depression  Goal: Treatment Goal: Demonstrate behavioral control of depressive symptoms, verbalize feelings of improved mood/affect, and adopt new coping skills prior to discharge  Outcome: Progressing  Goal: Refrain from isolation  Description: Interventions:  - Develop a trusting relationship   - Encourage socialization   Outcome: Progressing  Goal: Refrain from self-neglect  Outcome: Progressing     Problem: Anxiety  Goal: Anxiety is at manageable level  Description: Interventions:  - Assess and monitor patient's anxiety level  - Monitor for signs and symptoms (heart palpitations, chest pain, shortness of breath, headaches, nausea, feeling jumpy, restlessness, irritable, apprehensive)  - Collaborate with interdisciplinary team and initiate plan and interventions as ordered    - [place] patient to unit/surroundings  - Explain treatment plan  - Encourage participation in care  - Encourage verbalization of concerns/fears  - Identify coping mechanisms  - Assist in developing anxiety-reducing skills  - Administer/offer alternative therapies  - Limit or eliminate stimulants  Outcome: Progressing     Problem: Risk for Violence/Aggression Toward Others  Goal: Treatment Goal: Refrain from acts of violence/aggression during length of stay, and demonstrate improved impulse control at the time of discharge  Outcome: Progressing  Goal: Refrain from destructive acts on the environment or property  Outcome: Progressing  Goal: Control angry outbursts  Description: Interventions:  - Monitor patient closely, per order  - Ensure early verbal de-escalation  - Monitor prn medication needs  - Set reasonable/therapeutic limits, outline behavioral expectations, and consequences   - Provide a non-threatening milieu, utilizing the least restrictive interventions   Outcome: Progressing  Goal: Identify appropriate positive anger management techniques  Description: Interventions:  - Offer anger management and coping skills groups   - Staff will provide positive feedback for appropriate anger control  Outcome: Progressing     Problem: DISCHARGE PLANNING - CARE MANAGEMENT  Goal: Discharge to post-acute care or home with appropriate resources  Description: INTERVENTIONS:  - Conduct assessment to determine patient/family and health care team treatment goals, and need for post-acute services based on payer coverage, community resources, and patient preferences, and barriers to discharge  - Address psychosocial, clinical, and financial barriers to discharge as identified in assessment in conjunction with the patient/family and health care team  - Arrange appropriate level of post-acute services according to patient’s   needs and preference and payer coverage in collaboration with the physician and health care team  - Communicate with and update the patient/family, physician, and health care team regarding progress on the discharge plan  - Arrange appropriate transportation to post-acute venues  Outcome: Progressing

## 2023-01-28 NOTE — NURSING NOTE
Patient denies SI, HI, AHs and VHs  She also denies anxiety and depression, though does continue to appear anxious  Patient intermittently visible on the unit  Requested and received Mylanta for stomach upset  She is medication and meal compliant  Pt denies any needs at this time

## 2023-01-28 NOTE — NURSING NOTE
Patient is isolative to room, only out for needs  Patient observed resting in bed most of the time  Patient did not express any unmet needs  Patient is compliant with scheduled medications  Patient denied SI at this time  Patient reported hallucinations related to food  Staff to maintain continuous rounding for safety and support

## 2023-01-29 RX ADMIN — HALOPERIDOL 12.5 MG: 1 TABLET ORAL at 08:54

## 2023-01-29 RX ADMIN — VENLAFAXINE HYDROCHLORIDE 225 MG: 37.5 CAPSULE, EXTENDED RELEASE ORAL at 08:54

## 2023-01-29 RX ADMIN — CHOLECALCIFEROL TAB 25 MCG (1000 UNIT) 1000 UNITS: 25 TAB at 08:55

## 2023-01-29 RX ADMIN — SENNOSIDES AND DOCUSATE SODIUM 2 TABLET: 8.6; 5 TABLET ORAL at 20:00

## 2023-01-29 RX ADMIN — Medication 3 MG: at 21:17

## 2023-01-29 RX ADMIN — FLUTICASONE PROPIONATE 1 SPRAY: 50 SPRAY, METERED NASAL at 08:54

## 2023-01-29 RX ADMIN — METHOCARBAMOL 500 MG: 500 TABLET, FILM COATED ORAL at 12:59

## 2023-01-29 RX ADMIN — DOCUSATE SODIUM 100 MG: 100 CAPSULE, LIQUID FILLED ORAL at 17:10

## 2023-01-29 RX ADMIN — FLUTICASONE PROPIONATE 1 SPRAY: 50 SPRAY, METERED NASAL at 17:10

## 2023-01-29 RX ADMIN — CLOZAPINE 100 MG: 100 TABLET ORAL at 08:55

## 2023-01-29 RX ADMIN — HALOPERIDOL 15 MG: 5 TABLET ORAL at 17:10

## 2023-01-29 RX ADMIN — HYDROCORTISONE 1 APPLICATION: 25 CREAM TOPICAL at 13:01

## 2023-01-29 RX ADMIN — GUAIFENESIN 600 MG: 600 TABLET, EXTENDED RELEASE ORAL at 21:14

## 2023-01-29 RX ADMIN — GLYCERIN, HYPROMELLOSE, POLYETHYLENE GLYCOL 1 DROP: .2; .2; 1 LIQUID OPHTHALMIC at 12:59

## 2023-01-29 RX ADMIN — FENOFIBRATE 145 MG: 145 TABLET, COATED ORAL at 08:54

## 2023-01-29 RX ADMIN — NICOTINE 1 PATCH: 21 PATCH, EXTENDED RELEASE TRANSDERMAL at 08:53

## 2023-01-29 RX ADMIN — DOCUSATE SODIUM 100 MG: 100 CAPSULE, LIQUID FILLED ORAL at 08:55

## 2023-01-29 RX ADMIN — POLYETHYLENE GLYCOL 3350 17 G: 17 POWDER, FOR SOLUTION ORAL at 08:58

## 2023-01-29 RX ADMIN — HALOPERIDOL 0.5 MG: 0.5 TABLET ORAL at 12:59

## 2023-01-29 RX ADMIN — GUAIFENESIN 600 MG: 600 TABLET, EXTENDED RELEASE ORAL at 08:54

## 2023-01-29 RX ADMIN — CLOZAPINE 300 MG: 100 TABLET ORAL at 21:14

## 2023-01-29 RX ADMIN — PANTOPRAZOLE SODIUM 40 MG: 40 TABLET, DELAYED RELEASE ORAL at 06:06

## 2023-01-29 RX ADMIN — LORAZEPAM 0.5 MG: 0.5 TABLET ORAL at 08:55

## 2023-01-29 RX ADMIN — LORAZEPAM 1 MG: 1 TABLET ORAL at 21:13

## 2023-01-29 NOTE — NURSING NOTE
PT approach nursing station for PRN Mylanta 30ml for heartburn given at 2224  Will continue to monitor for effectiveness

## 2023-01-29 NOTE — NURSING NOTE
Patient denies SI, HI, AHs and VHs  She denies anxiety and depression  Patient appears to be anxious  Patient intermittently visible on unit, selectively social with room mate  She is medication and meal compliant  Denies any needs at this time

## 2023-01-29 NOTE — NURSING NOTE
Patient visible intermittently throughout the evening  Cooperative with routine  Denied any unmet needs  HS medication compliant  Will monitor

## 2023-01-29 NOTE — PLAN OF CARE
Problem: Alteration in Thoughts and Perception  Goal: Treatment Goal: Gain control of psychotic behaviors/thinking, reduce/eliminate presenting symptoms and demonstrate improved reality functioning upon discharge  Outcome: Progressing  Goal: Verbalize thoughts and feelings  Description: Interventions:  - Promote a nonjudgmental and trusting relationship with the patient through active listening and therapeutic communication  - Assess patient's level of functioning, behavior and potential for risk  - Engage patient in 1 on 1 interactions  - Encourage patient to express fears, feelings, frustrations, and discuss symptoms    - [place] patient to reality, help patient recognize reality-based thinking   - Administer medications as ordered and assess for potential side effects  - Provide the patient education related to the signs and symptoms of the illness and desired effects of prescribed medications  Outcome: Progressing  Goal: Refrain from acting on delusional thinking/internal stimuli  Description: Interventions:  - Monitor patient closely, per order   - Utilize least restrictive measures   - Set reasonable limits, give positive feedback for acceptable   - Administer medications as ordered and monitor of potential side effects  Outcome: Progressing  Goal: Agree to be compliant with medication regime, as prescribed and report medication side effects  Description: Interventions:  - Offer appropriate PRN medication and supervise ingestion; conduct AIMS, as needed   Outcome: Progressing  Goal: Recognize dysfunctional thoughts, communicate reality-based thoughts at the time of discharge  Description: Interventions:  - Provide medication and psycho-education to assist patient in compliance and developing insight into his/her illness   Outcome: Progressing  Goal: Complete daily ADLs, including personal hygiene independently, as able  Description: Interventions:  - Observe, teach, and assist patient with ADLS  - Monitor and promote a balance of rest/activity, with adequate nutrition and elimination   Outcome: Progressing     Problem: Ineffective Coping  Goal: Identifies ineffective coping skills  Outcome: Progressing  Goal: Identifies healthy coping skills  Outcome: Progressing  Goal: Demonstrates healthy coping skills  Outcome: Progressing  Goal: Patient/Family participate in treatment and DC plans  Description: Interventions:  - Provide therapeutic environment  Outcome: Progressing  Goal: Patient/Family verbalizes awareness of resources  Outcome: Progressing  Goal: Understands least restrictive measures  Description: Interventions:  - Utilize least restrictive behavior  Outcome: Progressing  Goal: Free from restraint events  Description: - Utilize least restrictive measures   - Provide behavioral interventions   - Redirect inappropriate behaviors   Outcome: Progressing     Problem: Risk for Self Injury/Neglect  Goal: Treatment Goal: Remain safe during length of stay, learn and adopt new coping skills, and be free of self-injurious ideation, impulses and acts at the time of discharge  Outcome: Progressing  Goal: Refrain from harming self  Description: Interventions:  - Monitor patient closely, per order  - Develop a trusting relationship  - Supervise medication ingestion, monitor effects and side effects   Outcome: Progressing  Goal: Recognize maladaptive responses and adopt new coping mechanisms  Outcome: Progressing     Problem: Depression  Goal: Treatment Goal: Demonstrate behavioral control of depressive symptoms, verbalize feelings of improved mood/affect, and adopt new coping skills prior to discharge  Outcome: Progressing  Goal: Refrain from isolation  Description: Interventions:  - Develop a trusting relationship   - Encourage socialization   Outcome: Progressing  Goal: Refrain from self-neglect  Outcome: Progressing     Problem: Anxiety  Goal: Anxiety is at manageable level  Description: Interventions:  - Assess and monitor patient's anxiety level  - Monitor for signs and symptoms (heart palpitations, chest pain, shortness of breath, headaches, nausea, feeling jumpy, restlessness, irritable, apprehensive)  - Collaborate with interdisciplinary team and initiate plan and interventions as ordered    - [place] patient to unit/surroundings  - Explain treatment plan  - Encourage participation in care  - Encourage verbalization of concerns/fears  - Identify coping mechanisms  - Assist in developing anxiety-reducing skills  - Administer/offer alternative therapies  - Limit or eliminate stimulants  Outcome: Progressing     Problem: Risk for Violence/Aggression Toward Others  Goal: Treatment Goal: Refrain from acts of violence/aggression during length of stay, and demonstrate improved impulse control at the time of discharge  Outcome: Progressing  Goal: Refrain from destructive acts on the environment or property  Outcome: Progressing  Goal: Control angry outbursts  Description: Interventions:  - Monitor patient closely, per order  - Ensure early verbal de-escalation  - Monitor prn medication needs  - Set reasonable/therapeutic limits, outline behavioral expectations, and consequences   - Provide a non-threatening milieu, utilizing the least restrictive interventions   Outcome: Progressing  Goal: Identify appropriate positive anger management techniques  Description: Interventions:  - Offer anger management and coping skills groups   - Staff will provide positive feedback for appropriate anger control  Outcome: Progressing     Problem: DISCHARGE PLANNING - CARE MANAGEMENT  Goal: Discharge to post-acute care or home with appropriate resources  Description: INTERVENTIONS:  - Conduct assessment to determine patient/family and health care team treatment goals, and need for post-acute services based on payer coverage, community resources, and patient preferences, and barriers to discharge  - Address psychosocial, clinical, and financial barriers to discharge as identified in assessment in conjunction with the patient/family and health care team  - Arrange appropriate level of post-acute services according to patient’s   needs and preference and payer coverage in collaboration with the physician and health care team  - Communicate with and update the patient/family, physician, and health care team regarding progress on the discharge plan  - Arrange appropriate transportation to post-acute venues  Outcome: Progressing

## 2023-01-29 NOTE — PROGRESS NOTES
Progress Note - Behavioral Health     Sarah Going 47 y o  female MRN: [id number]   Unit/Bed#: [facility] 190-65 Encounter: [id number]    Patient was seen and evaluated for continuity of care  As per nursing report yesterday afternoon, patient denied any symptoms of depression or anxiety but did appear to be anxious per nursing documentation  Nursing noted that patient was intermittently visible on the unit  She requested and received Mylanta for stomach upset  Patient reported feeling irritable, agitated, and restless yesterday afternoon and was given Haldol 1 mg p o  at 1:02 PM   As per nighttime nursing report last night, patient was visible intermittently throughout the evening and was cooperative with routine  As per nighttime nursing report last night, patient approached the nurses station for heartburn and received Mylanta  During the interview today, patient describes her mood as "good "  She states that she slept approximately 11 to 12 hours last night and notes feeling well rested  She states that she spent the day relaxing yesterday  She has been looking forward to discharge tentatively planned for tomorrow, 1/30/2023  She notes doing well overall in terms of her appetite  She denies SI/HI/AVH/delusions and denies any plan or intent to harm herself or others  She continues to be medication adherent and denies side effects  She agrees to continue with her current medication regimen as prescribed  She is able to contract for safety while in the hospital and states that she would speak to staff if not feeling safe      Sleep: normal, slept 11-12 hours  Appetite: normal  Medication side effects: No   ROS: no complaints, all other systems are negative    Mental Status Evaluation:    Appearance:   female, dressed casually, fair hygiene, obese, in no acute distress, sitting upright and comfortably   Behavior:  Cooperative, no psychomotor agitaiton or slowing, less guarded, less anxious appearing Speech:  normal volume, clear, coherent, scant   Mood:  "good"   Affect:  constricted but reactive, smiles occasionally   Thought Process:  concrete   Associations: intact associations   Thought Content:  less paranoid   Perceptual Disturbances: no auditory hallucinations, no visual hallucinations, denies auditory hallucinations when asked, auditory hallucinations, appears less distracted and did not appear to be internally preoccupied   Risk Potential: Suicidal ideation - None  Homicidal ideation - None  Potential for aggression - No   Sensorium:  oriented to person, place and situation   Memory:  recent and remote memory grossly intact   Consciousness:  alert and awake   Attention/Concentration: attention span and concentration appear shorter than expected for age   Insight:  limited   Judgment: limited   Gait/Station: normal gait/station   Motor Activity: no abnormal movements     Vital signs in last 24 hours:    Temp:  [96 6 °F (35 9 °C)-96 9 °F (36 1 °C)] 96 6 °F (35 9 °C)  HR:  [100-103] 103  Resp:  [16] 16  BP: (118-125)/(78-82) 118/78    Laboratory results: I have personally reviewed all pertinent laboratory/tests results    Results from the past 24 hours: No results found for this or any previous visit (from the past 24 hour(s))    Most Recent Labs:   Lab Results   Component Value Date    WBC 6 44 01/26/2023    RBC 4 29 01/26/2023    HGB 12 0 01/26/2023    HCT 39 0 01/26/2023     01/26/2023    RDW 13 7 01/26/2023    NEUTROABS 3 76 01/26/2023    SODIUM 139 01/10/2023    K 4 0 01/10/2023     01/10/2023    CO2 29 01/10/2023    BUN 12 01/10/2023    CREATININE 0 70 01/10/2023    GLUC 117 (H) 01/10/2023    CALCIUM 9 4 01/10/2023    AST 19 01/10/2023    ALT 19 01/10/2023    ALKPHOS 77 01/10/2023    TP 7 0 01/10/2023    ALB 4 1 01/10/2023    TBILI 0 47 01/10/2023    CHOLESTEROL 200 (H) 12/02/2022    HDL 51 12/02/2022    TRIG 127 12/02/2022    LDLCALC 124 (H) 12/02/2022    NONHDLC 149 12/02/2022 AMMONIA 28 10/27/2017    SAQ2XLHWEOUM 2 264 12/02/2022    FREET4 0 89 03/24/2016    PREGUR Negative 11/07/2022    PREGSERUM Negative 10/21/2019    HCG <2 00 04/10/2014    RPR Non-Reactive 12/07/2022    HGBA1C 5 6 12/02/2022     12/02/2022     Progress Toward Goals: progressing slowly    Assessment/Plan   Principal Problem:    Schizoaffective disorder, bipolar type (HCC)  Active Problems:    LYNN (generalized anxiety disorder)    Post-traumatic stress disorder, chronic    Gastroesophageal reflux disease    Insomnia      Recommended Treatment:     Planned medication and treatment changes:     All current active medications have been reviewed  Encourage group therapy, milieu therapy and occupational therapy  Behavioral Health checks every 7 minutes    -Continue medications as listed below  -Tentative discharge 1/30/2023    Current Facility-Administered Medications   Medication Dose Route Frequency Provider Last Rate   • acetaminophen  650 mg Oral Q6H PRN JARED Niño     • acetaminophen  650 mg Oral Q4H PRN JARED Niño     • acetaminophen  975 mg Oral Q6H PRN JARED Niño     • albuterol  2 puff Inhalation Q6H PRN JARED Niño     • aluminum-magnesium hydroxide-simethicone  30 mL Oral Q4H PRN JARED Niño     • benzonatate  100 mg Oral TID PRN JARED Niño     • haloperidol lactate  2 5 mg Intramuscular Q6H PRN Max [address] [place] [address] [address]      And   • LORazepam  1 mg Intramuscular Q6H PRN Max [address] [place] [address] [address]      And   • benztropine  0 5 mg Intramuscular Q6H PRN Max [address] [facility] [address]     • benztropine  1 mg Intramuscular Q4H PRN Max 6/day [facility] [address]     • haloperidol lactate  5 mg Intramuscular Q4H PRN Max [address] [place] [address] [address]      And   • LORazepam  2 mg Intramuscular Q4H PRN Max [address] [place] [address] [address]      And   • benztropine 1 mg Intramuscular Q4H PRN Max [address] [facility], [facility]      • benztropine  1 mg Oral Q4H PRN Max [address] [facility], [address] [place] [address]     • calcium carbonate  500 mg Oral Daily PRN Ede Fend, CRNP     • cholecalciferol  1,000 Units Oral Daily [facility], [facility]     • cloZAPine  100 mg Oral Daily Ede [facility], [address] [facility]     • cloZAPine  300 mg Oral HS Carolyn Burkett MD     • hydrOXYzine HCL  50 mg Oral Q6H PRN Max [address] [facility], 10 [facility]      Or   • diphenhydrAMINE  50 mg Intramuscular Q6H PRN Ede Fend, CRNP     • docusate sodium  100 mg Oral BID [facility] JARED Pfeiffer     • fenofibrate  145 mg Oral Daily [facility]     • fluticasone  1 spray Each Nare BID Ede Sixtod, CRNP     • glycerin-hypromellose-  1 drop Both Eyes Q3H PRN Ede Fend, CRNP     • guaiFENesin  600 mg Oral Q12H [address], [address] [place] [address]     • haloperidol  0 5 mg Oral Q4H PRN Max 6/day [address]     • haloperidol  1 mg Oral BID PRN Ede Fend, CRNP     • haloperidol  12 5 mg Oral QAM Carolyn Burkett MD     • haloperidol  15 mg Oral QPM Carolyn Burkett MD     • haloperidol  2 mg Oral Daily PRN Ede Fend, CRNP     • hydrocortisone   Topical 4x Daily PRN Ede Fend, CRNP     • hydrocortisone   Topical 4x Daily PRN Ede Fend, CRNP     • hydrOXYzine HCL  100 mg Oral Q6H PRN Max [address] [facility], 10 [facility]      Or   • LORazepam  2 mg Intramuscular Q6H PRN Ede Fend, CRNP     • hydrOXYzine HCL  25 mg Oral Q6H PRN Max [address] [facility], [address] [place] [address]     • LORazepam  0 5 mg Oral QAM Phong Rodriguez MD     • LORazepam  1 mg Oral HS Phong Rodriguez MD     • magnesium hydroxide  30 mL Oral Daily PRN Ede Fend, CRNP     • melatonin  3 mg Oral HS Phong Rodriguez MD     • methocarbamol  500 mg Oral Q6H PRN JARED Cabral     • nicotine  1 patch Transdermal Daily JARED Niño     • nicotine polacrilex  4 mg Oral Q2H PRN JARED Niño     • ondansetron  4 mg Oral Q6H PRN JARED Niño     • pantoprazole  40 mg Oral Early Morning JARED Robertson     • phenol  1 spray Mouth/Throat Q2H PRN [facility] PA-C     • polyethylene glycol  17 g Oral Daily JARED Robertson     • propranolol  10 mg Oral Q8H PRN JARED Niño     • senna-docusate sodium  1 tablet Oral Daily PRN JARED Niño     • senna-docusate sodium  2 tablet Oral HS JARED Boggs     • sorbitol  30 mL Oral Q1H PRN JARED Niño     • SUMAtriptan  50 mg Oral Daily PRN JARED Niño     • traZODone  50 mg Oral HS PRN JARED Niño     • venlafaxine  225 mg Oral Daily JARED Niño       Risks / Benefits of Treatment:    Risks, benefits, and possible side effects of medications explained to patient  Patient has limited understanding of risks and benefits of treatment at this time, but agrees to take medications as prescribed  Counseling / Coordination of Care: Total floor / unit time spent today 20 minutes  Greater than 50% of total time was spent with the patient and / or family counseling and / or coordination of care  A description of counseling / coordination of care:  Patient's progress discussed with staff in treatment team meeting  Medications, treatment progress and treatment plan reviewed with patient  Importance of medication and treatment compliance reviewed with patient  Reassurance and supportive therapy provided  Encouraged participation in milieu and group therapy on the unit      Norma Monsalve MD 01/29/23

## 2023-01-30 VITALS
BODY MASS INDEX: 30.86 KG/M2 | HEART RATE: 105 BPM | RESPIRATION RATE: 16 BRPM | TEMPERATURE: 96.5 F | HEIGHT: 60 IN | OXYGEN SATURATION: 99 % | WEIGHT: 157.2 LBS | DIASTOLIC BLOOD PRESSURE: 76 MMHG | SYSTOLIC BLOOD PRESSURE: 125 MMHG

## 2023-01-30 PROBLEM — G47.00 INSOMNIA: Status: RESOLVED | Noted: 2023-01-27 | Resolved: 2023-01-30

## 2023-01-30 RX ADMIN — CHOLECALCIFEROL TAB 25 MCG (1000 UNIT) 1000 UNITS: 25 TAB at 08:17

## 2023-01-30 RX ADMIN — POLYETHYLENE GLYCOL 3350 17 G: 17 POWDER, FOR SOLUTION ORAL at 08:16

## 2023-01-30 RX ADMIN — PANTOPRAZOLE SODIUM 40 MG: 40 TABLET, DELAYED RELEASE ORAL at 06:23

## 2023-01-30 RX ADMIN — VENLAFAXINE HYDROCHLORIDE 225 MG: 37.5 CAPSULE, EXTENDED RELEASE ORAL at 08:18

## 2023-01-30 RX ADMIN — HALOPERIDOL 12.5 MG: 1 TABLET ORAL at 08:17

## 2023-01-30 RX ADMIN — DOCUSATE SODIUM 100 MG: 100 CAPSULE, LIQUID FILLED ORAL at 08:18

## 2023-01-30 RX ADMIN — GUAIFENESIN 600 MG: 600 TABLET, EXTENDED RELEASE ORAL at 08:18

## 2023-01-30 RX ADMIN — FLUTICASONE PROPIONATE 1 SPRAY: 50 SPRAY, METERED NASAL at 08:17

## 2023-01-30 RX ADMIN — GLYCERIN, HYPROMELLOSE, POLYETHYLENE GLYCOL 1 DROP: .2; .2; 1 LIQUID OPHTHALMIC at 08:34

## 2023-01-30 RX ADMIN — FENOFIBRATE 145 MG: 145 TABLET, COATED ORAL at 08:17

## 2023-01-30 RX ADMIN — LORAZEPAM 0.5 MG: 0.5 TABLET ORAL at 08:17

## 2023-01-30 RX ADMIN — CLOZAPINE 100 MG: 100 TABLET ORAL at 08:18

## 2023-01-30 NOTE — BH TRANSITION RECORD
Contact Information: If you have any questions, concerns, pended studies, tests and/or procedures, or emergencies regarding your inpatient behavioral health visit  Please contact [address] [place] [address] behavioral health unit 3B [phone] and ask to speak to a , nurse or physician  A contact is available 24 hours/ 7 days a week at this number  Summary of Procedures Performed During your Stay:  Below is a list of major procedures performed during your hospital stay and a summary of results:  - Cardiac Procedures/Studies: ECG  "Sinus tachycardia  Low voltage QRS  Possible Inferior infarct"    If studies are pending at discharge, follow up with your PCP and/or referring provider

## 2023-01-30 NOTE — DISCHARGE SUMMARY
Discharge Summary - Behavioral Health Unit  Vadim Calhoun 47 y o  female MRN: [id number]  Unit/Bed#: [facility] 089-47 Encounter: [id number]     Admission Date: 1/11/2023 0145   Admission Orders (From admission, onward)     Ordered        01/23/23 1311  ED TO DIFFERENT CAMPUS [facility] UNIT or INPATIENT MEDICAL UNIT to [facility] (using Discharge Readmit Navigator) - Admit Patient to [address] [place] [address]  Once            Signed and Held  ED TO DIFFERENT CAMPUS  [address] [place] [address] UNIT or INPATIENT MEDICAL UNIT to [place] [id number] (using Discharge Readmit Navigator) - Admit Patient to [facility]  Once                          Discharge Date: 1/30/23    Attending Psychiatrist: Alma Saunders MD    Reason for Admission:   Vadim Calhoun is a 47 y o  female, admitted to the inpatient behavioral health unit at [address] [place] [address] [facility], as a voluntarily 201 commitment, subsequent to worsening depression, auditory and visual hallucinations  Please refer to the initial H&P below for full details  See below H&P from Ld Sánchez MD on 1/24/23 :    "Vadim Calhoun is a 47 y o  female, presenting originally to [facility] then to [facility] with inter facility transfer from 3P to 6T due to COVID-19 infection now returning to  possessing pertinent psychiatric history of schizoaffective disorder, LYNN, PTSD, subsequent to worsening depression, hopelessness, auditory and visual hallucinations  She is now being transferred from [address] [place] [address] after COVID-19 infection and states "I feel stable and ready to go home today, and I am not interested in pursuing more ECT treatments as I get bad flashbacks about prior ECT treatments when I think about it"  She describes her current mood as "pretty good like a 7 out of 10"    She describes her depression as a "0 out of 10" and her anxiety as a "5 out of 10, but I think is because I have not received my Ativan that normally take at home  I have been taking Atarax and it is helpful but I would prefer taking the Ativan"  He describes her sleep as "okay, but I do have issues initiating sleep and I have nightmares as well"  She describes her appetite as "good, however I am trying to be on a diet to lose weight and get my figure back"  She is currently denying suicidal and homicidal ideation  She also denies auditory and visual hallucinations  When asked if she has ever had a period of time that she had isolated psychosis in the absence of mood she states "yes, I will have the psychotic symptoms and hear voices telling me to kill myself and I will make me depressed and suicidal"  When asked about feelings of paranoia she states that it is chronic and she always feels like people after her specifically the feds and doctors stating that she has talked to the feds multiple times in regards to feeling like she is being hypnotized  She also fears that vapors are chasing her for the past few months and mentions that they are forcing her to eat and she is not sure how they have the power to do that stating "I do not mean to be geovany but maybe they are making me eat food to make my boobs bigger but I will know how they have the power to do that"  He does endorse feelings of general mistrust towards others especially when others are around her children and grandchildren  In regards to her discharge planning and her future plans she states she is looking forward to joining [employer], journaling, shopping and being around her kids and grandchildren as she did not feel safe to be around them previously but now feels she is stabilized and cannot be around them  She did express interest in wanting to resume her Ativan and the treatment team was in agreement with her and agreed to start her on 0 5 mg of Ativan in the morning and 1 mg of Ativan in the evening    She was also interested in starting melatonin to help with her sleep and the treatment team was agreeable with this and started her on melatonin 3 mg at nighttime  He did express interest in trying to find a job when she gets out stating "I think some people think I am faking it and just want to be on disability and get government assistance, but I am not and when I get out of here I am going to try to find a job as I have worked as a  in the past back in 2005 and I would like to find a job if I can" "    See below attestation from Jericho Huerta MD on 1/24/23 :    "Jennifer Dawkins is a 47 y o  female with a history of Schizoaffective Disorder, Generalized Anxiety Disorder, and PTSD who was admitted to the inpatient psychiatric unit on a voluntary 201 commitment basis due to signs of acute psychosis and suicidal ideation      Symptoms prior to admission included worsening depression, suicidal ideation, agitation, auditory hallucinations, visual hallucinations, and delusional thoughts  Records reviewed  Patient had presented to the [address] [place] [address] admitted for worsening auditory and visual hallucinations, with auditory hallucinations being commanding in nature to kill herself, and with suicidal thoughts  Patient did reportedly have a plan to overdose on pills at that time and had continued psychotic symptoms and depression while undergoing medication management  Plan had been to transfer to [address] [place] [address] for ECT  Patient did receive ECT on transfer but had contracted COVID-19 and ECT was then put on hold and patient was transferred to [facility]  On the COVID unit, clozapine was adjusted due to signs and symptoms of Clozapine toxicity  Level was found to be supratherapeutic and dose was reduced with notable improvement in tolerability  Patient has completed quarantine and is being transferred back from [facility] unit    Patient had been hesitant to resume ECT treatments, though was reported as being agreeable to this prior to transfer      On initial evaluation after admission to the inpatient psychiatric unit Nadine Larios is calm, pleasant, cooperative  She appears bright and generally cheerful  She states that she feels that she is currently doing better and believes that she is ready for discharge  She does note that she had initially come to the hospital due to worsening auditory and visual hallucinations and suicidal thoughts  She states that she does continue to have thoughts that others are out to hurt her but states that these are chronic thoughts that she has even when she is feeling overall well  She denies currently having any auditory or visual hallucinations and denies any suicidal thoughts or thoughts of harming others  She reports that her medications are overall working well for her but would like to try melatonin to assist with her sleep  Additionally, patient states that she does not wish to pursue ECT again as she felt that this was a very negative experience for her  She also notes that she had previously been on Ativan prior to admission, prescribed by her outpatient provider  This was seen on PDMP, last filled 11/22/2022  Patient states that she would like to resume this as she is no longer going to be pursuing ECT treatments  Patient amenable to starting melatonin to assist with sleep and monitoring at current doses of her current psychiatric medication regimen "    Hospital Course: The patient was admitted to the inpatient psychiatric unit and started on behavioral health checks every 15 minutes per unit protocol  Upon admission, the patient was evaluated by the medical service for medical clearance and further management of medical issues as needed  At the start of hospitalization, she was exhibiting symptoms of acute psychosis, suicidal ideation and anxiety  During hospitalization, Charon Cheadle was encouraged to participate in group therapy, milieu therapy, and occupational therapy  Patient was admitted on a 201 voluntary basis for treatment  Upon evaluation, she was continued on Clozapine 100 mg every morning and 300 mg nightly, Haldol 12 5 every morning and 15 mg nightly, effexor 225 mg daily and started on Melatonin 3 mg nightly and Ativan 0 5 mg daily and 1 mg nightly to address symptoms of psychosis and anxiety  Possible side effects were discussed with the patient prior to initiation, and she verbalized understanding  Medications were appropriately titrated to   The patient adhered to her medication regimen and denied any acute adverse effects not otherwise mentioned  Her symptoms began to improve, and her affect brightened throughout  the course of psychiatric management  She reported improvements in sleep, appetite, mood, energy levels  She was seen in [facility] interacting appropriately with peers and participating in group therapy  Carlos Shanks did not demonstrate dangerous behavior to self or peers during her inpatient stay  As she began to improve, the treatment team agreed she was safe for discharge with plan to continue outpatient treatment  At the time of discharge, Carlos Shanks reports feeling "wonderful"  She denied suicidal and homicidal ideations at the time of discharge, as well as auditory and visual hallucinations  Patient did not demonstrate any overt signs of sudheer or psychosis  Carlos Shanks was future-oriented and forward thinking, stating she plans to join the [employer] and wants to continue working out  Her goals are to work on her self-care and continue taking her medication because they work really well at reducing her hallucinations  Applicable follow up and safety plan was reviewed with the patient prior to discharge      Risk of Harm to Self:   The following ratings are based on assessment at the time of discharge  Demographic risk factors include: , never , age: over 48 or older  Historical Risk Factors include: chronic psychiatric problems, chronic depressive symptoms, chronic mood disorder, history of mood disorder  Current Specific Risk Factors include: recent inpatient psychiatric admission - being discharged today, diagnosis of mood disorder  Protective Factors: no current suicidal ideation, no current depressive symptoms, stable mood, connected to community, ability to contract for safety with staff, ability to communicate with staff  Weapons/Firearms: none  The following steps have been taken to ensure weapons are properly secured: not applicable  Based on today's assessment, Eb Salas presents the following risk of harm to self: low    Risk of Harm to Others: The following ratings are based on assessment at the time of discharge  Demographic Risk Factors include: none  Historical Risk Factors include: none  Current Specific Risk Factors include: none  Protective Factors: no current homicidal ideation, good impulse control, stable mood, compliant with medications, compliant with treatment, willing to continue psychiatric treatment, outpatient follow up established, connection to community, access to mental health treatment  Weapons/Firearms: none  The following steps have been taken to ensure weapons are properly secured: not applicable  • Based on today's assessment, Eb Salas presents the following risk of harm to others: low     Discharge Medications:    Psychiatric medications include: Clozapine 100 mg daily, Clozapine 300 mg daily at bedtime, Haldol 12 5 mg every morning, Haldol 15 mg every evening, Ativan 0 5 mg every morning, Ativan 1 mg daily at bedtime, melatonin 3 mg daily at bedtime, Effexor 225 mg daily    Other home medications for medical conditions were continued throughout hospitalization, if applicable  See AVS for more details  Follow ups:     The patient is scheduled for follow up at:    • ACT team immediately on discharge     Behavioral Health Medications:   all current active meds have been reviewed, continue current psychiatric medications and current meds:   Current Facility-Administered Medications   Medication Dose Route Frequency   • acetaminophen (TYLENOL) tablet 650 mg  650 mg Oral Q6H PRN   • acetaminophen (TYLENOL) tablet 650 mg  650 mg Oral Q4H PRN   • acetaminophen (TYLENOL) tablet 975 mg  975 mg Oral Q6H PRN   • albuterol (PROVENTIL HFA,VENTOLIN HFA) inhaler 2 puff  2 puff Inhalation Q6H PRN   • aluminum-magnesium hydroxide-simethicone (MYLANTA) oral suspension 30 mL  30 mL Oral Q4H PRN   • benzonatate (TESSALON PERLES) capsule 100 mg  100 mg Oral TID PRN   • haloperidol lactate (HALDOL) injection 2 5 mg  2 5 mg Intramuscular Q6H PRN Max 4/day    And   • LORazepam (ATIVAN) injection 1 mg  1 mg Intramuscular Q6H PRN Max 4/day    And   • benztropine (COGENTIN) injection 0 5 mg  0 5 mg Intramuscular Q6H PRN Max 4/day   • benztropine (COGENTIN) injection 1 mg  1 mg Intramuscular Q4H PRN Max 6/day   • haloperidol lactate (HALDOL) injection 5 mg  5 mg Intramuscular Q4H PRN Max 4/day    And   • LORazepam (ATIVAN) injection 2 mg  2 mg Intramuscular Q4H PRN Max 4/day    And   • benztropine (COGENTIN) injection 1 mg  1 mg Intramuscular Q4H PRN Max 4/day   • benztropine (COGENTIN) tablet 1 mg  1 mg Oral Q4H PRN Max 6/day   • calcium carbonate (TUMS) chewable tablet 500 mg  500 mg Oral Daily PRN   • cholecalciferol (VITAMIN D3) tablet 1,000 Units  1,000 Units Oral Daily   • cloZAPine (CLOZARIL) tablet 100 mg  100 mg Oral Daily   • cloZAPine (CLOZARIL) tablet 300 mg  300 mg Oral HS   • hydrOXYzine HCL (ATARAX) tablet 50 mg  50 mg Oral Q6H PRN Max 4/day    Or   • diphenhydrAMINE (BENADRYL) injection 50 mg  50 mg Intramuscular Q6H PRN   • docusate sodium (COLACE) capsule 100 mg  100 mg Oral BID   • fenofibrate (TRICOR) tablet 145 mg  145 mg Oral Daily   • fluticasone (FLONASE) 50 mcg/act nasal spray 1 spray  1 spray Each Nare BID   • glycerin-hypromellose- (ARTIFICIAL TEARS) ophthalmic solution 1 drop  1 drop Both Eyes Q3H PRN   • guaiFENesin (MUCINEX) 12 hr tablet 600 mg  600 mg Oral Q12H [facility]   • haloperidol (HALDOL) tablet 0 5 mg  0 5 mg Oral Q4H PRN Max 6/day   • haloperidol (HALDOL) tablet 1 mg  1 mg Oral BID PRN   • haloperidol (HALDOL) tablet 12 5 mg  12 5 mg Oral QAM   • haloperidol (HALDOL) tablet 15 mg  15 mg Oral QPM   • haloperidol (HALDOL) tablet 2 mg  2 mg Oral Daily PRN   • hydrocortisone (ANUSOL-HC) 2 5 % rectal cream   Topical 4x Daily PRN   • hydrocortisone 1 % cream   Topical 4x Daily PRN   • hydrOXYzine HCL (ATARAX) tablet 100 mg  100 mg Oral Q6H PRN Max 4/day    Or   • LORazepam (ATIVAN) injection 2 mg  2 mg Intramuscular Q6H PRN   • hydrOXYzine HCL (ATARAX) tablet 25 mg  25 mg Oral Q6H PRN Max 4/day   • LORazepam (ATIVAN) tablet 0 5 mg  0 5 mg Oral QAM   • LORazepam (ATIVAN) tablet 1 mg  1 mg Oral HS   • magnesium hydroxide (MILK OF MAGNESIA) oral suspension 30 mL  30 mL Oral Daily PRN   • melatonin tablet 3 mg  3 mg Oral HS   • methocarbamol (ROBAXIN) tablet 500 mg  500 mg Oral Q6H PRN   • nicotine (NICODERM CQ) 21 mg/24 hr TD 24 hr patch 1 patch  1 patch Transdermal Daily   • nicotine polacrilex (NICORETTE) gum 4 mg  4 mg Oral Q2H PRN   • ondansetron (ZOFRAN-ODT) dispersible tablet 4 mg  4 mg Oral Q6H PRN   • pantoprazole (PROTONIX) EC tablet 40 mg  40 mg Oral Early Morning   • phenol (CHLORASEPTIC) 1 4 % mucosal liquid 1 spray  1 spray Mouth/Throat Q2H PRN   • polyethylene glycol (MIRALAX) packet 17 g  17 g Oral Daily   • propranolol (INDERAL) tablet 10 mg  10 mg Oral Q8H PRN   • senna-docusate sodium (SENOKOT S) 8 6-50 mg per tablet 1 tablet  1 tablet Oral Daily PRN   • senna-docusate sodium (SENOKOT S) 8 6-50 mg per tablet 2 tablet  2 tablet Oral HS   • sorbitol 70 % solution 30 mL  30 mL Oral Q1H PRN   • SUMAtriptan (IMITREX) tablet 50 mg  50 mg Oral Daily PRN   • traZODone (DESYREL) tablet 50 mg  50 mg Oral HS PRN   • venlafaxine (EFFEXOR-XR) 24 hr capsule 225 mg  225 mg Oral Daily     Labs/Imaging:   I have personally reviewed all pertinent laboratory/tests results    Most Recent Labs:   Lab Results   Component Value Date    WBC 6 44 01/26/2023    RBC 4 29 01/26/2023    HGB 12 0 01/26/2023    HCT 39 0 01/26/2023     01/26/2023    RDW 13 7 01/26/2023    NEUTROABS 3 76 01/26/2023    SODIUM 139 01/10/2023    K 4 0 01/10/2023     01/10/2023    CO2 29 01/10/2023    BUN 12 01/10/2023    CREATININE 0 70 01/10/2023    GLUC 117 (H) 01/10/2023    GLUF 115 (H) 12/23/2022    CALCIUM 9 4 01/10/2023    AST 19 01/10/2023    ALT 19 01/10/2023    ALKPHOS 77 01/10/2023    TP 7 0 01/10/2023    ALB 4 1 01/10/2023    TBILI 0 47 01/10/2023    CHOLESTEROL 200 (H) 12/02/2022    HDL 51 12/02/2022    TRIG 127 12/02/2022    LDLCALC 124 (H) 12/02/2022    NONHDLC 149 12/02/2022    AMMONIA 28 10/27/2017    [id number] 2 264 12/02/2022    FREET4 0 89 03/24/2016    PREGUR Negative 11/07/2022    PREGSERUM Negative 10/21/2019    HCG <2 00 04/10/2014    RPR Non-Reactive 12/07/2022    HGBA1C 5 6 12/02/2022     12/02/2022     Clozapine:   Lab Results   Component Value Date    CLOZAPINE 630 01/17/2023    NCLOZIP 522 01/17/2023       Mental Status at time of Discharge:   Appearance:  alert, good eye contact, appears stated age, casually dressed and appropriate grooming and hygiene   Behavior:  calm and cooperative   Speech:  slow and scant   Mood:  "wonderful"   Affect:  Appropriately reactive and brighter   Language Within normal limits   Thought Process:  organized, logical, goal directed, normal rate of thoughts   Thought Content:  No verbalized delusions, chronic paranoid ideation   Perceptual Disturbances: no reported hallucinations, denies current hallucinations and does not appear to be responding to internal stimuli at this time   Risk Potential: Denies suicidal or homicidal ideation, plan, or intent   Sensorium:  person, place, time and current situation   Cognition:  Grossly intact   Consciousness:  alert and awake   Attention: attention span and concentration were age appropriate   Insight: fair   Judgment: fair   Intellect appears to be below average intelligence   Gait/Station: normal gait/station   Motor Activity: no abnormal movements       Discharge Diagnosis:   Patient Active Problem List   Diagnosis   • Schizoaffective disorder, bipolar type ([facility])   • LYNN (generalized anxiety disorder)   • Slow transit constipation   • Degenerative disc disease, lumbar   • Hyperlipidemia   • Post-traumatic stress disorder, chronic   • Mild intermittent asthma without complication   • Tobacco abuse   • Gastroesophageal reflux disease   • Otitis media   • Vitamin D deficiency   • Hemorrhoids   • Continuous leakage of urine   • Mixed stress and urge urinary incontinence   • Right lower quadrant abdominal pain   • Chest tightness   • Hoarseness   • Other headache syndrome   • Memory difficulty   • Dependence on nicotine from cigarettes   • Emphysema lung ([facility])   • History of alcohol dependence ([facility])   • Sore throat   • Mild neurocognitive disorder   • Medical clearance for psychiatric admission   • Non-seasonal allergic rhinitis   • Chronic midline low back pain without sciatica   • Insomnia       Discharge Medications:  See list above, as well as the after visit summary containing reconciled discharge medications provided to patient and family  Discharge instructions/Information to patient and family:   See after visit summary for information provided to patient and family  Provisions for Follow-Up Care:  See after visit summary for information related to follow-up care and any pertinent home health orders  This note has been constructed using a voice recognition system  There may be translation, syntax,  or grammatical errors  If you have any questions, please contact the dictating provider      Shona Quarles MD   Psychiatry PGY-1

## 2023-01-30 NOTE — NURSING NOTE
Patient mostly isolative to room and bed throughout the evening  Socializing with her roommate  Upon approach, she question how to get lip fillers "Do I get that during ECT?" Patient seemed unsure of what she was exactly requesting  Explained lip injections are not done while inpatient  HS medication compliant  No PRNs needed  Will monitor

## 2023-01-30 NOTE — PROGRESS NOTES
Pt to D/C today  Pt denies SI/HI/AVH  Pt oriented x3  Pt to d/c to her group home and her ACT team will  upon discharge  Pt to follow up with ACT team immediately upon discharge  Scripts sent to preferred pharmacy       Discharge Address: [address] [facility], [address] [place] [address]  Phone: [phone]

## 2023-01-30 NOTE — NURSING NOTE
Pt awoke and completed all morning routines  AVS printed and reviewed with Pt  Pt verbalized understanding of discharge instructions and agreed to be compliant with medication regimen  When discussing smoking cessation Pt stated that she is not ready to quit smoking but that she is aware of the resources available to her if she should choose to do so  Pt discharged with all of her belongings at time of discharge  Pt discharged to ACT team at [phone] to return to her group home

## 2023-01-30 NOTE — PROGRESS NOTES
01/30/23 0809   Team Meeting   Meeting Type Daily Rounds   Team Members Present   Team Members Present Physician;Nurse;   Physician Team Member Shira [id number] [facility] Mar[facility]   Patient/Family Present   Patient Present No   Patient's Family Present No   discharge today

## 2023-01-30 NOTE — PROGRESS NOTES
The writer completed Jo-Ann's Relapse Prevention Plan  Juan Rodriguez knows she needs to seek help when she is not taking care of herself property and when she is starting to feel hopeless  Juan Rodriguez is prepared to call her supports and to listen to music when she does not feel her best  She is aware of the symptoms that will prompt her to contact her doctors  Juan Jennifer appears self-aware and ready to take steps to improve her mental health

## 2023-01-30 NOTE — PROGRESS NOTES
01/30/23 1000   Activity/Group Checklist   Group Community meeting   Attendance Attended   Attendance Duration (min) 16-30   Interactions Interacted appropriately   Affect/Mood Appropriate   Goals Achieved Identified feelings; Able to listen to others; Identified resources and support systems

## 2023-01-30 NOTE — NURSING NOTE
Pt denies SI/HI/AH/VH but at times appears internally preoccupied  Present in dayroom and milieu  Medication and meal compliant  Social with peers  0323 PRN artificial tears given  Pt preoccupied with being discharged today  0934 PRN Artificial tears effective

## 2023-02-03 NOTE — PLAN OF CARE
Problem: Alteration in Thoughts and Perception  Goal: Treatment Goal: Gain control of psychotic behaviors/thinking, reduce/eliminate presenting symptoms and demonstrate improved reality functioning upon discharge  Outcome: Progressing  Goal: Verbalize thoughts and feelings  Description: Interventions:  - Promote a nonjudgmental and trusting relationship with the patient through active listening and therapeutic communication  - Assess patient's level of functioning, behavior and potential for risk  - Engage patient in 1 on 1 interactions  - Encourage patient to express fears, feelings, frustrations, and discuss symptoms    - [place] patient to reality, help patient recognize reality-based thinking   - Administer medications as ordered and assess for potential side effects  - Provide the patient education related to the signs and symptoms of the illness and desired effects of prescribed medications  Outcome: Progressing  Goal: Refrain from acting on delusional thinking/internal stimuli  Description: Interventions:  - Monitor patient closely, per order   - Utilize least restrictive measures   - Set reasonable limits, give positive feedback for acceptable   - Administer medications as ordered and monitor of potential side effects  Outcome: Progressing  Goal: Agree to be compliant with medication regime, as prescribed and report medication side effects  Description: Interventions:  - Offer appropriate PRN medication and supervise ingestion; conduct AIMS, as needed   Outcome: Progressing  Goal: Attend and participate in unit activities, including therapeutic, recreational, and educational groups  Description: Interventions:  -Encourage Visitation and family involvement in care  Outcome: Progressing  Goal: Recognize dysfunctional thoughts, communicate reality-based thoughts at the time of discharge  Description: Interventions:  - Provide medication and psycho-education to assist patient in compliance and developing insight into his/her illness   Outcome: Progressing  Goal: Complete daily ADLs, including personal hygiene independently, as able  Description: Interventions:  - Observe, teach, and assist patient with ADLS  - Monitor and promote a balance of rest/activity, with adequate nutrition and elimination   Outcome: Progressing     Problem: Ineffective Coping  Goal: Cooperates with admission process  Description: Interventions:   - Complete admission process  Outcome: Progressing  Goal: Identifies ineffective coping skills  Outcome: Not Progressing  Goal: Identifies healthy coping skills  Outcome: Not Progressing  Goal: Demonstrates healthy coping skills  Outcome: Not Progressing  Goal: Participates in unit activities  Description: Interventions:  - Provide therapeutic environment   - Provide required programming   - Redirect inappropriate behaviors   Outcome: Progressing  Goal: Patient/Family participate in treatment and DC plans  Description: Interventions:  - Provide therapeutic environment  Outcome: Progressing  Goal: Patient/Family verbalizes awareness of resources  Outcome: Progressing  Goal: Understands least restrictive measures  Description: Interventions:  - Utilize least restrictive behavior  Outcome: Progressing  Goal: Free from restraint events  Description: - Utilize least restrictive measures   - Provide behavioral interventions   - Redirect inappropriate behaviors   Outcome: Progressing     Problem: Risk for Self Injury/Neglect  Goal: Treatment Goal: Remain safe during length of stay, learn and adopt new coping skills, and be free of self-injurious ideation, impulses and acts at the time of discharge  Outcome: Progressing  Goal: Verbalize thoughts and feelings  Description: Interventions:  - Assess and re-assess patient's lethality and potential for self-injury  - Engage patient in 1:1 interactions, daily, for a minimum of 15 minutes  - Encourage patient to express feelings, fears, frustrations, hopes  - Establish rapport/trust with patient   Outcome: Progressing  Goal: Refrain from harming self  Description: Interventions:  - Monitor patient closely, per order  - Develop a trusting relationship  - Supervise medication ingestion, monitor effects and side effects   Outcome: Progressing  Goal: Attend and participate in unit activities, including therapeutic, recreational, and educational groups  Description: Interventions:  - Provide therapeutic and educational activities daily, encourage attendance and participation, and document same in the medical record  - Obtain collateral information, encourage visitation and family involvement in care   Outcome: Progressing  Goal: Recognize maladaptive responses and adopt new coping mechanisms  Outcome: Not Progressing  Goal: Complete daily ADLs, including personal hygiene independently, as able  Description: Interventions:  - Observe, teach, and assist patient with ADLS  - Monitor and promote a balance of rest/activity, with adequate nutrition and elimination  Outcome: Progressing     Problem: Depression  Goal: Treatment Goal: Demonstrate behavioral control of depressive symptoms, verbalize feelings of improved mood/affect, and adopt new coping skills prior to discharge  Outcome: Not Progressing  Goal: Verbalize thoughts and feelings  Description: Interventions:  - Assess and re-assess patient's level of risk   - Engage patient in 1:1 interactions, daily, for a minimum of 15 minutes   - Encourage patient to express feelings, fears, frustrations, hopes   Outcome: Progressing  Goal: Refrain from harming self  Description: Interventions:  - Monitor patient closely, per order   - Supervise medication ingestion, monitor effects and side effects   Outcome: Progressing  Goal: Refrain from isolation  Description: Interventions:  - Develop a trusting relationship   - Encourage socialization   Outcome: Progressing  Goal: Refrain from self-neglect  Outcome: Progressing  Goal: Attend and participate in unit activities, including therapeutic, recreational, and educational groups  Description: Interventions:  - Provide therapeutic and educational activities daily, encourage attendance and participation, and document same in the medical record   Outcome: Progressing  Goal: Complete daily ADLs, including personal hygiene independently, as able  Description: Interventions:  - Observe, teach, and assist patient with ADLS  -  Monitor and promote a balance of rest/activity, with adequate nutrition and elimination   Outcome: Progressing     Problem: Anxiety  Goal: Anxiety is at manageable level  Description: Interventions:  - Assess and monitor patient's anxiety level  - Monitor for signs and symptoms (heart palpitations, chest pain, shortness of breath, headaches, nausea, feeling jumpy, restlessness, irritable, apprehensive)  - Collaborate with interdisciplinary team and initiate plan and interventions as ordered    - [place] patient to unit/surroundings  - Explain treatment plan  - Encourage participation in care  - Encourage verbalization of concerns/fears  - Identify coping mechanisms  - Assist in developing anxiety-reducing skills  - Administer/offer alternative therapies  - Limit or eliminate stimulants  Outcome: Progressing     Problem: Risk for Violence/Aggression Toward Others  Goal: Treatment Goal: Refrain from acts of violence/aggression during length of stay, and demonstrate improved impulse control at the time of discharge  Outcome: Not Progressing  Goal: Verbalize thoughts and feelings  Description: Interventions:  - Assess and re-assess patient's level of risk, every waking shift  - Engage patient in 1:1 interactions, daily, for a minimum of 15 minutes   - Allow patient to express feelings and frustrations in a safe and non-threatening manner   - Establish rapport/trust with patient   Outcome: Progressing  Goal: Refrain from harming others  Outcome: Progressing  Goal: Refrain from destructive acts on the environment or property  Outcome: Progressing  Goal: Control angry outbursts  Description: Interventions:  - Monitor patient closely, per order  - Ensure early verbal de-escalation  - Monitor prn medication needs  - Set reasonable/therapeutic limits, outline behavioral expectations, and consequences   - Provide a non-threatening milieu, utilizing the least restrictive interventions   Outcome: Not Progressing  Goal: Attend and participate in unit activities, including therapeutic, recreational, and educational groups  Description: Interventions:  - Provide therapeutic and educational activities daily, encourage attendance and participation, and document same in the medical record   Outcome: Progressing  Goal: Identify appropriate positive anger management techniques  Description: Interventions:  - Offer anger management and coping skills groups   - Staff will provide positive feedback for appropriate anger control  Outcome: Not Progressing     Problem: Alteration in Orientation  Goal: Treatment Goal: Demonstrate a reduction of confusion and improved orientation to person, place, time and/or situation upon discharge, according to optimum baseline/ability  Outcome: Progressing  Goal: Interact with staff daily  Description: Interventions:  - Assess and re-assess patient's level of orientation  - Engage patient in 1 on 1 interactions, daily, for a minimum of 15 minutes   - Establish rapport/trust with patient   Outcome: Progressing  Goal: Express concerns related to confused thinking related to:  Description: Interventions:  - Encourage patient to express feelings, fears, frustrations, hopes  - Assign consistent caregivers   - [place]/re-orient patient as needed  - Allow comfort items, as appropriate  - Provide visual cues, signs, etc    Outcome: Progressing  Goal: Allow medical examinations, as recommended  Description: Interventions:  - Provide physical/neurological exams and/or referrals, per provider   Outcome: Progressing  Goal: Cooperate with recommended testing/procedures  Description: Interventions:  - Determine need for ancillary testing  - Observe for mental status changes  - Implement falls/precaution protocol   Outcome: Progressing  Goal: Attend and participate in unit activities, including therapeutic, recreational, and educational groups  Description: Interventions:  - Provide therapeutic and educational activities daily, encourage attendance and participation, and document same in the medical record   - Provide appropriate opportunities for reminiscence   - Provide a consistent daily routine   - Encourage family contact/visitation   Outcome: Progressing  Goal: Complete daily ADLs, including personal hygiene independently, as able  Description: Interventions:  - Observe, teach, and assist patient with ADLS  Outcome: Progressing     Problem: Individualized Interventions  Goal: Patient will verbalize appropriate use of telephone within 5 days  Description: Interventions:  - Treatment team to determine use of supervised phone privileges   Outcome: Progressing  Goal: Patient will verbalize need for hospitalization and will no longer attempt elopement within 5 days  Description: Interventions:  - Ongoing education to help patient understand need for hospitalization  Outcome: Progressing  Goal: Patient will recognize inappropriate behaviors and develop alternative behaviors within 5 days  Description: Interventions:  - Patient in collaboration with Treatment Team will develop a behavior management plan to help identify effective coping skills to deal with stressors  Outcome: Progressing Yes

## 2023-02-13 DIAGNOSIS — K64.9 HEMORRHOIDS, UNSPECIFIED HEMORRHOID TYPE: Primary | ICD-10-CM

## 2023-02-13 RX ORDER — HYDROCORTISONE 10 MG/G
1 CREAM TOPICAL 4 TIMES DAILY PRN
Qty: 30 G | Refills: 1 | Status: SHIPPED | OUTPATIENT
Start: 2023-02-13

## 2023-02-16 ENCOUNTER — OFFICE VISIT (OUTPATIENT)
Dept: INTERNAL MEDICINE CLINIC | Facility: CLINIC | Age: 53
End: 2023-02-16

## 2023-02-16 VITALS
OXYGEN SATURATION: 94 % | BODY MASS INDEX: 31.44 KG/M2 | HEART RATE: 100 BPM | SYSTOLIC BLOOD PRESSURE: 122 MMHG | RESPIRATION RATE: 18 BRPM | DIASTOLIC BLOOD PRESSURE: 78 MMHG | WEIGHT: 161 LBS | TEMPERATURE: 97.5 F

## 2023-02-16 DIAGNOSIS — J02.9 PHARYNGITIS, UNSPECIFIED ETIOLOGY: Primary | ICD-10-CM

## 2023-02-16 DIAGNOSIS — Z12.31 BREAST CANCER SCREENING BY MAMMOGRAM: ICD-10-CM

## 2023-02-16 DIAGNOSIS — K59.00 CONSTIPATION: ICD-10-CM

## 2023-02-16 DIAGNOSIS — T78.40XS ALLERGY, SEQUELA: ICD-10-CM

## 2023-02-16 DIAGNOSIS — E78.5 HYPERLIPIDEMIA: ICD-10-CM

## 2023-02-16 RX ORDER — CETIRIZINE HYDROCHLORIDE 10 MG/1
10 TABLET ORAL DAILY
Qty: 90 TABLET | Refills: 0 | Status: SHIPPED | OUTPATIENT
Start: 2023-02-16

## 2023-02-16 RX ORDER — POLYETHYLENE GLYCOL 3350 17 G/17G
17 POWDER, FOR SOLUTION ORAL DAILY
Qty: 100 EACH | Refills: 0 | Status: SHIPPED | OUTPATIENT
Start: 2023-02-16

## 2023-02-16 RX ORDER — DOCUSATE SODIUM 100 MG/1
100 CAPSULE, LIQUID FILLED ORAL 2 TIMES DAILY
Qty: 60 CAPSULE | Refills: 0 | Status: SHIPPED | OUTPATIENT
Start: 2023-02-16 | End: 2023-03-18

## 2023-02-16 RX ORDER — CETIRIZINE HYDROCHLORIDE 10 MG/1
TABLET ORAL
COMMUNITY
Start: 2023-02-09 | End: 2023-02-16 | Stop reason: SDUPTHER

## 2023-02-16 RX ORDER — VENLAFAXINE HYDROCHLORIDE 75 MG/1
225 CAPSULE, EXTENDED RELEASE ORAL DAILY
Qty: 90 CAPSULE | Refills: 0 | Status: CANCELLED | OUTPATIENT
Start: 2023-02-16 | End: 2023-03-18

## 2023-02-16 RX ORDER — CLOZAPINE 200 MG/1
TABLET ORAL
Status: CANCELLED | OUTPATIENT
Start: 2023-02-16

## 2023-02-16 RX ORDER — CLOZAPINE 200 MG/1
TABLET ORAL
COMMUNITY
Start: 2022-11-14

## 2023-02-16 RX ORDER — FENOFIBRATE 145 MG/1
145 TABLET, COATED ORAL DAILY
Qty: 30 TABLET | Refills: 0 | Status: SHIPPED | OUTPATIENT
Start: 2023-02-16

## 2023-02-16 RX ORDER — CLOZAPINE 100 MG/1
TABLET ORAL
Qty: 120 TABLET | Refills: 1 | Status: CANCELLED | OUTPATIENT
Start: 2023-02-16

## 2023-02-16 RX ORDER — LORAZEPAM 1 MG/1
TABLET ORAL
Qty: 45 TABLET | Refills: 0 | Status: CANCELLED | OUTPATIENT
Start: 2023-02-16

## 2023-02-16 NOTE — PROGRESS NOTES
Name: Arnoldo Myles      : 1970      MRN: [id number]  Encounter Provider: Broderick Lopez DO  Encounter Date: 2023   Encounter department: [facility]  [address]     1  Pharyngitis, unspecified etiology- likely viral  Supportive therapies and increasing fluid intake was encouraged  2  Hyperlipidemia- refill provided  -     fenofibrate (TRICOR) 145 mg tablet; Take 1 tablet (145 mg total) by mouth daily    3  Constipation- refill provided  -     docusate sodium (COLACE) 100 mg capsule; Take 1 capsule (100 mg total) by mouth 2 (two) times a day  -     polyethylene glycol (MIRALAX) 17 g packet; Take 17 g by mouth daily    4  Breast cancer screening by mammogram- due for screening  -     Mammo screening bilateral w 3d & cad; Future; Expected date: 2023    5  Allergy, sequela- refill provided  -     cetirizine (ZyrTEC) 10 mg tablet; Take 1 tablet (10 mg total) by mouth daily           Subjective      Pt c/o of sore, scratchy throat  x2 week  Worse in the morning  Using halls for relief  Of note had covid in [place]     Review of Systems   Constitutional: Negative for fever  HENT: Positive for sore throat  Negative for congestion, rhinorrhea, sneezing and trouble swallowing  Respiratory: Positive for cough (mostly in the morning )  Current Outpatient Medications on File Prior to Visit   Medication Sig   • albuterol (2 5 mg/3 mL) 0 083 % nebulizer solution Take 3 mL (2 5 mg total) by nebulization every 6 (six) hours as needed for wheezing or shortness of breath   • albuterol (PROVENTIL HFA,VENTOLIN HFA) 90 mcg/act inhaler Inhale 2 puffs every 6 (six) hours as needed for wheezing or shortness of breath   • cholecalciferol (VITAMIN D3) 1,000 units tablet Take 1 tablet (1,000 Units total) by mouth daily Do not start before 2022     • cloZAPine (CLOZARIL) 100 mg tablet Take 1 tablet in the morning (100 mg) and Take 3 tablets in the evening (300 mg)   • clozapine (CLOZARIL) 200 MG tablet    • fluticasone (FLONASE) 50 mcg/act nasal spray 1 spray into each nostril 2 (two) times a day   • haloperidol (HALDOL) 5 mg tablet Take Haldol 2 5 tablets (12 5 mg) in the morning and Take Haldol 3 tablets (15 mg) in the evening   • hydrocortisone 2 5 % cream    • Hydrocortisone, Perianal, (Proctocort) 1 % CREA Apply 1 application topically 4 (four) times a day as needed (hemorroids)   • LORazepam (ATIVAN) 1 mg tablet Take 1/2 tablet (0 5 mg) every morning and 1 tablet (1 mg) every evening  • melatonin 3 mg Take 1 tablet (3 mg total) by mouth daily at bedtime   • methocarbamol (ROBAXIN) 500 mg tablet Take 1 tablet (500 mg total) by mouth every 6 (six) hours as needed for muscle spasms   • pantoprazole (PROTONIX) 40 mg tablet Take 1 tablet (40 mg total) by mouth daily in the early morning Do not start before November 16, 2022  • senna-docusate sodium (SENOKOT S) 8 6-50 mg per tablet Take 1 tablet by mouth daily at bedtime   • sorbitol 70 % Take 30 mL by mouth every hour as needed (constipation 3rd line refractory to Miralax  Take q1h until BM)   • venlafaxine (EFFEXOR-XR) 75 mg 24 hr capsule Take 3 capsules (225 mg total) by mouth daily Do not start before January 28, 2023     • [DISCONTINUED] cetirizine (ZyrTEC) 10 mg tablet    • [DISCONTINUED] docusate sodium (COLACE) 100 mg capsule Take 1 capsule (100 mg total) by mouth 2 (two) times a day   • [DISCONTINUED] fenofibrate (TRICOR) 145 mg tablet Take 1 tablet (145 mg total) by mouth daily   • aluminum-magnesium hydroxide-simethicone (MYLANTA) 6841-9870-785 mg/30 mL suspension Take 30 mL by mouth every 4 (four) hours as needed for indigestion or heartburn (dyspepsia) (Patient not taking: Reported on 2/16/2023)   • haloperidol (HALDOL) 1 mg tablet Take 1 tablet (1 mg total) by mouth 2 (two) times a day as needed (halllucinations)   • Incontinence Supply Disposable (Depend Underwear Sm/Med) MISC Use 3 (three) times a day as needed (incontinence)   • [DISCONTINUED] nicotine (NICODERM CQ) 21 mg/24 hr TD 24 hr patch Place 1 patch on the skin daily Do not start before November 16, 2022  (Patient not taking: Reported on 2/16/2023)   • [DISCONTINUED] nicotine polacrilex (NICORETTE) 4 mg gum Chew 1 each (4 mg total) every 2 (two) hours as needed for smoking cessation (Patient not taking: Reported on 2/16/2023)   • [DISCONTINUED] polyethylene glycol (MIRALAX) 17 g packet Take 17 g by mouth daily as needed (Constipation) (Patient not taking: Reported on 2/16/2023)       Objective     /78 (BP Location: Left arm, Patient Position: Sitting, Cuff Size: Standard)   Pulse 100   Temp 97 5 °F (36 4 °C) (Temporal)   Resp 18   Wt 73 kg (161 lb)   LMP  (LMP Unknown)   SpO2 94%   BMI 31 44 kg/m²     Physical Exam  HENT:      Head: Normocephalic  Right Ear: Tympanic membrane and ear canal normal       Left Ear: Tympanic membrane and ear canal normal       Mouth/Throat:      Mouth: Mucous membranes are dry  Pharynx: Posterior oropharyngeal erythema (mild ) present  No pharyngeal swelling or oropharyngeal exudate  Tonsils: No tonsillar exudate  Eyes:      Conjunctiva/sclera: Conjunctivae normal    Cardiovascular:      Rate and Rhythm: Normal rate and regular rhythm  Pulmonary:      Effort: Pulmonary effort is normal       Breath sounds: Normal breath sounds  Musculoskeletal:      Cervical back: Normal range of motion and neck supple  Lymphadenopathy:      Cervical: No cervical adenopathy  Skin:     General: Skin is dry  Neurological:      General: No focal deficit present  Mental Status: She is alert and oriented to person, place, and time         David Staff, DO

## 2023-02-17 ENCOUNTER — TELEPHONE (OUTPATIENT)
Dept: INTERNAL MEDICINE CLINIC | Facility: CLINIC | Age: 53
End: 2023-02-17

## 2023-02-17 NOTE — TELEPHONE ENCOUNTER
Doctor antoinette I spoke to shaina about her prior authorization letter we receive that she is not in their data base she needs to call member services and when I called she said that she already have the cream no need to appeal or for her to call also she stated she is aware she has a f/u with you in 3 weeks but want to know can you prescribe for her the lidocaine patches sooner she took the robaxin yesterday and it makes her to sleepy and depress she only take the pill as needed for muscle back pain when needed by her other doctor

## 2023-02-20 DIAGNOSIS — S39.012D STRAIN OF LUMBAR REGION, SUBSEQUENT ENCOUNTER: Primary | ICD-10-CM

## 2023-02-20 RX ORDER — LIDOCAINE 50 MG/G
1 PATCH TOPICAL DAILY
Qty: 30 PATCH | Refills: 0 | Status: SHIPPED | OUTPATIENT
Start: 2023-02-20

## 2023-02-24 DIAGNOSIS — K59.00 CONSTIPATION: ICD-10-CM

## 2023-02-24 RX ORDER — DOCUSATE SODIUM 50 MG AND SENNOSIDES 8.6 MG 8.6; 5 MG/1; MG/1
TABLET, FILM COATED ORAL
Qty: 30 TABLET | Refills: 4 | Status: SHIPPED | OUTPATIENT
Start: 2023-02-24

## 2023-02-28 ENCOUNTER — APPOINTMENT (OUTPATIENT)
Dept: LAB | Facility: CLINIC | Age: 53
End: 2023-02-28

## 2023-03-14 ENCOUNTER — HOSPITAL ENCOUNTER (OUTPATIENT)
Dept: MAMMOGRAPHY | Facility: CLINIC | Age: 53
Discharge: HOME | End: 2023-03-14

## 2023-03-14 DIAGNOSIS — Z12.31 BREAST CANCER SCREENING BY MAMMOGRAM: ICD-10-CM

## 2023-03-15 ENCOUNTER — TELEPHONE (OUTPATIENT)
Dept: INTERNAL MEDICINE CLINIC | Facility: CLINIC | Age: 53
End: 2023-03-15

## 2023-03-15 ENCOUNTER — OFFICE VISIT (OUTPATIENT)
Dept: INTERNAL MEDICINE CLINIC | Facility: CLINIC | Age: 53
End: 2023-03-15

## 2023-03-15 VITALS
OXYGEN SATURATION: 93 % | SYSTOLIC BLOOD PRESSURE: 122 MMHG | RESPIRATION RATE: 18 BRPM | HEART RATE: 105 BPM | TEMPERATURE: 97.5 F | WEIGHT: 158.6 LBS | BODY MASS INDEX: 30.97 KG/M2 | DIASTOLIC BLOOD PRESSURE: 82 MMHG

## 2023-03-15 DIAGNOSIS — N39.46 MIXED STRESS AND URGE URINARY INCONTINENCE: ICD-10-CM

## 2023-03-15 DIAGNOSIS — Z72.0 TOBACCO ABUSE: ICD-10-CM

## 2023-03-15 DIAGNOSIS — E78.5 HYPERLIPIDEMIA, UNSPECIFIED HYPERLIPIDEMIA TYPE: Primary | ICD-10-CM

## 2023-03-15 DIAGNOSIS — J43.9 PULMONARY EMPHYSEMA, UNSPECIFIED EMPHYSEMA TYPE (HCC): ICD-10-CM

## 2023-03-15 DIAGNOSIS — M54.50 CHRONIC MIDLINE LOW BACK PAIN WITHOUT SCIATICA: ICD-10-CM

## 2023-03-15 DIAGNOSIS — G89.29 CHRONIC MIDLINE LOW BACK PAIN WITHOUT SCIATICA: ICD-10-CM

## 2023-03-15 DIAGNOSIS — Z00.00 ANNUAL PHYSICAL EXAM: Primary | ICD-10-CM

## 2023-03-15 DIAGNOSIS — R41.3 MEMORY DIFFICULTY: ICD-10-CM

## 2023-03-15 PROBLEM — H66.90 OTITIS MEDIA: Status: RESOLVED | Noted: 2021-07-21 | Resolved: 2023-03-15

## 2023-03-15 PROBLEM — R07.89 CHEST TIGHTNESS: Status: RESOLVED | Noted: 2022-03-28 | Resolved: 2023-03-15

## 2023-03-15 RX ORDER — PREDNISONE 20 MG/1
40 TABLET ORAL DAILY
Qty: 10 TABLET | Refills: 0 | Status: SHIPPED | OUTPATIENT
Start: 2023-03-15 | End: 2023-03-20

## 2023-03-15 RX ORDER — POLYETHYLENE GLYCOL 3350 17 G/17G
POWDER ORAL
COMMUNITY
Start: 2023-02-16

## 2023-03-15 RX ORDER — DIAPER,BRIEF,ADULT, DISPOSABLE
EACH MISCELLANEOUS 3 TIMES DAILY PRN
Qty: 138 EACH | Refills: 7 | Status: SHIPPED | OUTPATIENT
Start: 2023-03-15 | End: 2023-06-13

## 2023-03-15 RX ORDER — LANOLIN ALCOHOL/MO/W.PET/CERES
CREAM (GRAM) TOPICAL
COMMUNITY
Start: 2023-03-10

## 2023-03-15 RX ORDER — DIAPER,BRIEF,ADULT, DISPOSABLE
EACH MISCELLANEOUS 3 TIMES DAILY PRN
Qty: 96 EACH | Refills: 2 | Status: SHIPPED | OUTPATIENT
Start: 2023-03-15 | End: 2023-03-15 | Stop reason: SDUPTHER

## 2023-03-15 RX ORDER — AZITHROMYCIN 250 MG/1
TABLET, FILM COATED ORAL
Qty: 6 TABLET | Refills: 0 | Status: SHIPPED | OUTPATIENT
Start: 2023-03-15 | End: 2023-03-19
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
## 2023-03-15 NOTE — TELEPHONE ENCOUNTER
Finn Oquendo saw Dr Herman Hugo today  Needs pull ups- size small; since her bottom is small- goes through [facility]     [facility]

## 2023-03-16 ENCOUNTER — TELEPHONE (OUTPATIENT)
Dept: INTERNAL MEDICINE CLINIC | Facility: CLINIC | Age: 53
End: 2023-03-16

## 2023-03-16 NOTE — TELEPHONE ENCOUNTER
Patient states that the group home is giving her 1 tablet a day for prednisone 20mg  She wants to confirm that its 2 tablets a day, total of 40mg a day?

## 2023-03-16 NOTE — TELEPHONE ENCOUNTER
Spoke to Marlon Teran from the group home, he states that she is receiving the correct dosage of prednisone, total of 40 mg daily for 5 days  He also states that patient is confusing her medication, but confirmed all medication is given correctly

## 2023-03-21 DIAGNOSIS — E78.5 HYPERLIPIDEMIA: ICD-10-CM

## 2023-03-21 RX ORDER — FENOFIBRATE 145 MG/1
TABLET, COATED ORAL
Qty: 30 TABLET | Refills: 4 | Status: SHIPPED | OUTPATIENT
Start: 2023-03-21

## 2023-03-22 ENCOUNTER — TELEPHONE (OUTPATIENT)
Dept: INTERNAL MEDICINE CLINIC | Facility: CLINIC | Age: 53
End: 2023-03-22

## 2023-03-22 DIAGNOSIS — J43.9 PULMONARY EMPHYSEMA, UNSPECIFIED EMPHYSEMA TYPE (HCC): Primary | ICD-10-CM

## 2023-03-22 NOTE — TELEPHONE ENCOUNTER
Patient would like to know if Dr aSmy Wellington can prescribe Prednisone for her use on a daily basis  She has difficulty breathing due to emphysema especially when walking or climbing stairs  Pt uses [facility]

## 2023-03-22 NOTE — TELEPHONE ENCOUNTER
Spoke with: patient  Re:  Prednisone/Pulmonology  Provider's message/resuts/instructions relayed in full detail    Comments:    Need referral for Dr Clifford Means

## 2023-03-24 DIAGNOSIS — S39.012D STRAIN OF LUMBAR REGION, SUBSEQUENT ENCOUNTER: ICD-10-CM

## 2023-03-24 RX ORDER — LIDOCAINE 50 MG/G
PATCH TOPICAL
Qty: 30 PATCH | Refills: 0 | Status: SHIPPED | OUTPATIENT
Start: 2023-03-24

## 2023-03-25 DIAGNOSIS — K27.9 PEPTIC ULCER DISEASE: ICD-10-CM

## 2023-03-25 RX ORDER — PANTOPRAZOLE SODIUM 40 MG/1
TABLET, DELAYED RELEASE ORAL
Qty: 28 TABLET | Refills: 4 | Status: SHIPPED | OUTPATIENT
Start: 2023-03-25

## 2023-03-27 ENCOUNTER — TELEPHONE (OUTPATIENT)
Dept: INTERNAL MEDICINE CLINIC | Facility: CLINIC | Age: 53
End: 2023-03-27

## 2023-03-27 NOTE — TELEPHONE ENCOUNTER
SAW   DR MONGE   LAST   WEEK   STILL  NOT  FEELING   BETTER  NO  FEVER   BUT  STILL  HAS  A LOT  OF  THICK   PHLEGM  AND  STILL   COUGHING    A LOT    WANTS  TO  KNOW  WHAT   TO  DO  NEXT   SAID  SHE  FINISH   ALL  HER   RX   UP   SAID  SHE  HAS  A HARD  TIME   GETTING  A  RIDE  TO  COME  IN

## 2023-03-27 NOTE — TELEPHONE ENCOUNTER
Productive cough is common after an acute flare  She needs to use her albuterol inhaler , decongestant and cough medicaiton as needed  IIf her breathing is am issue despite the steroids and the antibiotic she has to go to the ER

## 2023-03-28 ENCOUNTER — TELEPHONE (OUTPATIENT)
Dept: INTERNAL MEDICINE CLINIC | Facility: CLINIC | Age: 53
End: 2023-03-28

## 2023-03-29 ENCOUNTER — TELEPHONE (OUTPATIENT)
Dept: INTERNAL MEDICINE CLINIC | Facility: CLINIC | Age: 53
End: 2023-03-29

## 2023-03-29 DIAGNOSIS — Z11.1 ENCOUNTER FOR PPD TEST: Primary | ICD-10-CM

## 2023-03-29 DIAGNOSIS — J43.9 PULMONARY EMPHYSEMA, UNSPECIFIED EMPHYSEMA TYPE (HCC): Primary | ICD-10-CM

## 2023-03-29 NOTE — TELEPHONE ENCOUNTER
Pt lives in a group home and RX [facility]  Pt called yesterday in regards to a decongestant      Pt needs something for cough, she is wheezing again  Can't be over the counter     Call pt with questions [phone]

## 2023-03-30 NOTE — TELEPHONE ENCOUNTER
Patient calling again, She needs a decongestant and something for her cough sent over to [facility]  She is using her inhaler

## 2023-03-31 ENCOUNTER — HOSPITAL ENCOUNTER (OUTPATIENT)
Dept: ULTRASOUND IMAGING | Facility: CLINIC | Age: 53
End: 2023-03-31

## 2023-03-31 ENCOUNTER — HOSPITAL ENCOUNTER (OUTPATIENT)
Dept: MAMMOGRAPHY | Facility: CLINIC | Age: 53
End: 2023-03-31

## 2023-03-31 VITALS — BODY MASS INDEX: 31.02 KG/M2 | WEIGHT: 158 LBS | HEIGHT: 60 IN

## 2023-03-31 DIAGNOSIS — R92.8 ABNORMAL MAMMOGRAM: ICD-10-CM

## 2023-04-03 DIAGNOSIS — K59.00 CONSTIPATION: ICD-10-CM

## 2023-04-03 RX ORDER — POLYETHYLENE GLYCOL 3350 17 G/17G
POWDER ORAL
Qty: 510 G | Refills: 4 | Status: SHIPPED | OUTPATIENT
Start: 2023-04-03

## 2023-04-06 ENCOUNTER — TELEPHONE (OUTPATIENT)
Age: 53
End: 2023-04-06

## 2023-04-06 NOTE — TELEPHONE ENCOUNTER
Spoke with pt to schedule appt for consult for emphysema from referral list   Pt states she has to call back when she is home with her group home counselor because they schedule for her and are her transportation to appt

## 2023-05-01 DIAGNOSIS — K59.00 CONSTIPATION: ICD-10-CM

## 2023-05-01 RX ORDER — DOCUSATE SODIUM 100 MG/1
CAPSULE, LIQUID FILLED ORAL
Qty: 60 CAPSULE | Refills: 4 | Status: SHIPPED | OUTPATIENT
Start: 2023-05-01

## 2023-05-03 ENCOUNTER — TELEPHONE (OUTPATIENT)
Age: 53
End: 2023-05-03

## 2023-05-04 ENCOUNTER — APPOINTMENT (OUTPATIENT)
Age: 53
End: 2023-05-04

## 2023-05-16 ENCOUNTER — TELEMEDICINE (OUTPATIENT)
Age: 53
End: 2023-05-16

## 2023-05-16 DIAGNOSIS — J43.9 PULMONARY EMPHYSEMA, UNSPECIFIED EMPHYSEMA TYPE (HCC): Primary | ICD-10-CM
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
## 2023-05-23 ENCOUNTER — HOSPITAL ENCOUNTER (EMERGENCY)
Facility: HOSPITAL | Age: 53
Discharge: HOME | End: 2023-05-24
Attending: EMERGENCY MEDICINE

## 2023-05-23 DIAGNOSIS — R44.3 HALLUCINATIONS: Primary | ICD-10-CM

## 2023-05-23 RX ORDER — NICOTINE 21 MG/24HR
21 PATCH, TRANSDERMAL 24 HOURS TRANSDERMAL ONCE
Status: DISCONTINUED | OUTPATIENT
Start: 2023-05-23 | End: 2023-05-24 | Stop reason: HOSPADM

## 2023-05-23 RX ORDER — ACETAMINOPHEN 325 MG/1
975 TABLET ORAL ONCE
Status: COMPLETED | OUTPATIENT
Start: 2023-05-23 | End: 2023-05-23

## 2023-05-23 RX ORDER — LORAZEPAM 1 MG/1
2 TABLET ORAL ONCE
Status: COMPLETED | OUTPATIENT
Start: 2023-05-23 | End: 2023-05-23

## 2023-05-23 RX ADMIN — NICOTINE 21 MG: 21 PATCH, EXTENDED RELEASE TRANSDERMAL at 18:07

## 2023-05-23 RX ADMIN — LORAZEPAM 2 MG: 1 TABLET ORAL at 18:03

## 2023-05-23 RX ADMIN — ACETAMINOPHEN 975 MG: 325 TABLET, FILM COATED ORAL at 18:03

## 2023-05-23 NOTE — Clinical Note
Makenna Veto should be transferred out to [address] [place] [address] and has been medically cleared

## 2023-05-23 NOTE — ED NOTES
"Crisis met with pt to complete Crisis Intake and Safety Risk Assessment  Introduce self, role, and evaluation process  Pt presents in ED via ambulance From [address] [place] [address] for a psychiatric evaluation  She has a history of Schizoaffective Disorder, and multiple prior inpatient psychiatric hospitalizations  She states she has been having increase auditory hallucinations, suicidal ideations with no plan, and paranoid delusions  She also states, she hears voices \"constanstly\" when she is alone and does not think that will change  Last inpatient admission was in January 2023 at [facility] for suicidal thoughts  Pt states she is compliant with taking her medications but feels they are no longer working  She sees her psychiatrist and participates in weekly individual therapy at [address] [place] [address]  During assessment, pt was calm and cooperative  She had disorganized thoughts, and appeared paranoid  Crisis and pt discussed treatment options and the need for inpatient admission  Pt signed 201 after rights and 72 hour noticed were discussed and reviewed    "

## 2023-05-24 VITALS
RESPIRATION RATE: 18 BRPM | DIASTOLIC BLOOD PRESSURE: 76 MMHG | OXYGEN SATURATION: 97 % | SYSTOLIC BLOOD PRESSURE: 119 MMHG | HEART RATE: 73 BPM | TEMPERATURE: 98.6 F

## 2023-05-24 LAB
EXT PREGNANCY TEST URINE: NEGATIVE
EXT. CONTROL: NORMAL
S PYO DNA THROAT QL NAA+PROBE: NOT DETECTED

## 2023-05-24 RX ORDER — VENLAFAXINE HYDROCHLORIDE 75 MG/1
225 CAPSULE, EXTENDED RELEASE ORAL DAILY
Status: DISCONTINUED | OUTPATIENT
Start: 2023-05-24 | End: 2023-05-24 | Stop reason: HOSPADM

## 2023-05-24 RX ORDER — HALOPERIDOL 5 MG/1
12.5 TABLET ORAL ONCE
Status: COMPLETED | OUTPATIENT
Start: 2023-05-24 | End: 2023-05-24

## 2023-05-24 RX ORDER — ACETAMINOPHEN 325 MG/1
650 TABLET ORAL ONCE
Status: COMPLETED | OUTPATIENT
Start: 2023-05-24 | End: 2023-05-24

## 2023-05-24 RX ORDER — LORAZEPAM 0.5 MG/1
0.5 TABLET ORAL ONCE
Status: COMPLETED | OUTPATIENT
Start: 2023-05-24 | End: 2023-05-24

## 2023-05-24 RX ORDER — CLOZAPINE 25 MG/1
25 TABLET ORAL ONCE
Status: COMPLETED | OUTPATIENT
Start: 2023-05-24 | End: 2023-05-24

## 2023-05-24 RX ORDER — CLOZAPINE 100 MG/1
100 TABLET ORAL ONCE
Status: DISCONTINUED | OUTPATIENT
Start: 2023-05-24 | End: 2023-05-24

## 2023-05-24 RX ADMIN — HALOPERIDOL 12.5 MG: 5 TABLET ORAL at 10:39

## 2023-05-24 RX ADMIN — LORAZEPAM 0.5 MG: 0.5 TABLET ORAL at 10:41

## 2023-05-24 RX ADMIN — VENLAFAXINE HYDROCHLORIDE 225 MG: 75 CAPSULE, EXTENDED RELEASE ORAL at 10:39

## 2023-05-24 RX ADMIN — ACETAMINOPHEN 650 MG: 325 TABLET ORAL at 04:49

## 2023-05-24 RX ADMIN — CLOZAPINE 25 MG: 25 TABLET ORAL at 10:42

## 2023-05-24 NOTE — ED NOTES
Writer placed a call to [facility] staff to alert them that the patient will be discharged  Staff at the [facility] stated that they would be unable to pick-up the patient until 2nd shift begins at 3:00  They asked this writer to call back the [facility] after 3:00 to find when the patient will be transported back to the [facility]

## 2023-05-24 NOTE — ED NOTES
This writer discussed the patients current presentation and recommended discharge plan with Dr Castañeda  They agree with the patient being discharged at this time  This writer and the patient completed a safety plan  The patient was provided with a copy of their safety plan with encouragement to utilize the plan following discharge  In addition, the patient was instructed to call local Count includes the [facility] crisis, other crisis services, 911 or to go to the nearest ER immediately if their situation changes at any time  This writer discussed discharge plans with the patient who agrees with and understands the discharge plans           SAFETY PLAN  Warning Signs (thoughts, images, mood, behavior, situations) of a potential crisis: paranoid thoughts, confrontations with group home residents      Coping Skills (what can I do to take my mind off the problem, or to keep myself safe): take a walk, journal      Outside Support (who can I reach out to for support and help): family, ACT team, group home staff        National Suicide Prevention Hotline:  [address] [phone] - [facility]: [facility]: [facility] [address] [place] [address] [address] [phone] - Crisis   [phone] - Peer Support Talk Line (1-9pm daily)  [phone] - Teen Support Talk Line (1-9pm daily)  [address] [facility]) [phone] - [address] [facility]

## 2023-05-24 NOTE — ED CARE HANDOFF
Emergency Department Sign Out Note        Sign out and transfer of care from Dr Rafi Taylor  See Separate Emergency Department note  The patient, Dang Jones, was evaluated by the previous provider for anxiety and auditory command hallucinations  201 signed  On re-evaluation, patient is sleeping, medically stable, awaiting inpatient psychiatric placement                                        Procedures  MDM        Disposition  Final diagnoses:   Hallucinations     Time reflects when diagnosis was documented in both MDM as applicable and the Disposition within this note     Time User Action Codes Description Comment    5/24/2023  8:53 AM Jaskaran Holguin Add [R44 3] Hallucinations       ED Disposition     ED Disposition   Transfer to [address] [place] [address]   --    Date/Time   Wed May 24, 2023  8:53 AM    Comment   --         Follow-up Information    None       Patient's Medications   Discharge Prescriptions    No medications on file     No discharge procedures on file         ED Provider  Electronically Signed by     Keven Hahn MD  05/24/23 9121

## 2023-05-24 NOTE — ED NOTES
Received report from departing shift  Patient vitals were due however to promote sleep, vitals will be deferred until the patient wakes  Patient is currently sleeping  Rise and fall of chest noted with no outward signs of distress  Patient resting comfortably with the lights off and blankets on  1:1 continues in place       Shanda Cormier RN  05/23/23 9155
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
## 2023-05-30 ENCOUNTER — TELEPHONE (OUTPATIENT)
Age: 53
End: 2023-05-30

## 2023-05-30 NOTE — TELEPHONE ENCOUNTER
[employer] tells patient that they did not receive our returned fax from 5/10/23 for patients incontinence supplies  Can this be refaxed please  Also, patient is requesting the discontinuations of the decongestant that she is taking  it's drying her out too much   Dextromethorphan-guaifenesin (Mucinex DM)     Patients # [phone]

## 2023-06-02 DIAGNOSIS — S39.012D STRAIN OF LUMBAR REGION, SUBSEQUENT ENCOUNTER: ICD-10-CM

## 2023-06-02 RX ORDER — LIDOCAINE 50 MG/G
PATCH TOPICAL
Qty: 30 PATCH | Refills: 4 | Status: SHIPPED | OUTPATIENT
Start: 2023-06-02

## 2023-06-05 ENCOUNTER — APPOINTMENT (OUTPATIENT)
Age: 53
End: 2023-06-05
Payer: COMMERCIAL

## 2023-06-08 DIAGNOSIS — T78.40XS ALLERGY, SEQUELA: ICD-10-CM

## 2023-06-09 RX ORDER — CETIRIZINE HYDROCHLORIDE 10 MG/1
TABLET ORAL
Qty: 28 TABLET | Refills: 4 | Status: SHIPPED | OUTPATIENT
Start: 2023-06-09

## 2023-06-28 ENCOUNTER — APPOINTMENT (OUTPATIENT)
Age: 53
End: 2023-06-28
Payer: COMMERCIAL

## 2023-06-29 DIAGNOSIS — E55.9 VITAMIN D DEFICIENCY: ICD-10-CM

## 2023-06-29 DIAGNOSIS — K59.00 CONSTIPATION: ICD-10-CM

## 2023-07-03 ENCOUNTER — TELEPHONE (OUTPATIENT)
Age: 53
End: 2023-07-03

## 2023-07-03 RX ORDER — MELATONIN
Qty: 28 TABLET | Refills: 4 | Status: SHIPPED | OUTPATIENT
Start: 2023-07-03

## 2023-07-03 RX ORDER — DOCUSATE SODIUM 50 MG AND SENNOSIDES 8.6 MG 8.6; 5 MG/1; MG/1
TABLET, FILM COATED ORAL
Qty: 28 TABLET | Refills: 4 | Status: SHIPPED | OUTPATIENT
Start: 2023-07-03

## 2023-07-03 NOTE — TELEPHONE ENCOUNTER
Pt would like her DM cough syrup and a decongestant called in to [address] I offered her an appt, but she said Siva Da Silva told her to call if she needed these. Call pt if needed.   Ph [phone]

## 2023-07-03 NOTE — TELEPHONE ENCOUNTER
Patient called about the decongestant and cough syrup to see if it was called in the [facility] direct pharmacy.   Phone number is [phone]

## 2023-07-20 ENCOUNTER — TELEPHONE (OUTPATIENT)
Age: 53
End: 2023-07-20

## 2023-07-20 NOTE — TELEPHONE ENCOUNTER
Pt has congestion; requesting cough syrup and something for her decongestion  She's not getting any better     [facility]     She saw Dr Bettie Sykes in May  Was just at [facility]

## 2023-07-25 ENCOUNTER — APPOINTMENT (OUTPATIENT)
Age: 53
End: 2023-07-25
Payer: COMMERCIAL

## 2023-07-31 DIAGNOSIS — E78.5 HYPERLIPIDEMIA: ICD-10-CM

## 2023-08-01 RX ORDER — FENOFIBRATE 145 MG/1
TABLET, COATED ORAL
Qty: 28 TABLET | Refills: 4 | Status: SHIPPED | OUTPATIENT
Start: 2023-08-01

## 2023-08-04 ENCOUNTER — TELEPHONE (OUTPATIENT)
Dept: HEMATOLOGY ONCOLOGY | Facility: CLINIC | Age: 53
End: 2023-08-04

## 2023-08-04 ENCOUNTER — OFFICE VISIT (OUTPATIENT)
Age: 53
End: 2023-08-04
Payer: COMMERCIAL

## 2023-08-04 ENCOUNTER — TELEPHONE (OUTPATIENT)
Dept: URGENT CARE | Facility: CLINIC | Age: 53
End: 2023-08-04

## 2023-08-04 ENCOUNTER — TELEPHONE (OUTPATIENT)
Age: 53
End: 2023-08-04

## 2023-08-04 VITALS
BODY MASS INDEX: 29.64 KG/M2 | DIASTOLIC BLOOD PRESSURE: 72 MMHG | SYSTOLIC BLOOD PRESSURE: 110 MMHG | TEMPERATURE: 97.7 F | WEIGHT: 151 LBS | HEART RATE: 97 BPM | RESPIRATION RATE: 18 BRPM | OXYGEN SATURATION: 95 % | HEIGHT: 60 IN

## 2023-08-04 DIAGNOSIS — J01.10 ACUTE NON-RECURRENT FRONTAL SINUSITIS: Primary | ICD-10-CM

## 2023-08-04 DIAGNOSIS — R05.1 ACUTE COUGH: ICD-10-CM

## 2023-08-04 DIAGNOSIS — D72.9 NEUTROPHILIA: Primary | ICD-10-CM

## 2023-08-04 PROCEDURE — 99213 OFFICE O/P EST LOW 20 MIN: CPT

## 2023-08-04 RX ORDER — PREDNISONE 20 MG/1
40 TABLET ORAL DAILY
Qty: 10 TABLET | Refills: 0 | Status: SHIPPED | OUTPATIENT
Start: 2023-08-04 | End: 2023-08-09

## 2023-08-04 RX ORDER — AMOXICILLIN AND CLAVULANATE POTASSIUM 875; 125 MG/1; MG/1
1 TABLET, FILM COATED ORAL EVERY 12 HOURS SCHEDULED
Qty: 14 TABLET | Refills: 0 | Status: SHIPPED | OUTPATIENT
Start: 2023-08-04 | End: 2023-08-11

## 2023-08-04 RX ORDER — LAMOTRIGINE 25 MG/1
TABLET ORAL
COMMUNITY
Start: 2023-05-03

## 2023-08-04 RX ORDER — DOXYCYCLINE HYCLATE 100 MG
TABLET ORAL
COMMUNITY
Start: 2023-07-05

## 2023-08-04 RX ORDER — BENZONATATE 100 MG/1
100 CAPSULE ORAL 3 TIMES DAILY PRN
Qty: 20 CAPSULE | Refills: 0 | Status: SHIPPED | OUTPATIENT
Start: 2023-08-04

## 2023-08-04 RX ORDER — PREDNISONE 20 MG/1
TABLET ORAL
COMMUNITY
Start: 2023-07-05 | End: 2023-08-04

## 2023-08-04 NOTE — PATIENT INSTRUCTIONS
Take antibiotics as directed for next week. Take medication with food to avoid upset stomach and complete entire course of therapy even if symptoms improve. May take tessalon perles as needed as directed for cough and continue other medications as previously prescribed. Follow-up with PCP in 3-5 days if no improvement of symptoms. Report to the ER if symptoms worsen.
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
## 2023-08-04 NOTE — TELEPHONE ENCOUNTER
Pharmacy called. Prednisone interacts with pt psych meds. I will dc prednisone, continue other medications as rx.

## 2023-08-04 NOTE — TELEPHONE ENCOUNTER
Requesting a referral to go to Hematology. fax number  [phone] if need. My psychiatrists and my counselor that are worried because my white cell count has been high for the last two years.      (notes in chart from hematology from todays call)

## 2023-08-08 ENCOUNTER — TELEPHONE (OUTPATIENT)
Dept: HEMATOLOGY ONCOLOGY | Facility: CLINIC | Age: 53
End: 2023-08-08

## 2023-08-08 NOTE — TELEPHONE ENCOUNTER
I called Belinda Loera in response to a referral that was received for patient to establish care with Hematology. Outreach was made to schedule a consultation. Patient's  picked up and stated that she is out of town for a couple of days, patient will call back to schedule when she gets home. The referral has been closed.

## 2023-08-14 ENCOUNTER — TELEPHONE (OUTPATIENT)
Dept: HEMATOLOGY ONCOLOGY | Facility: CLINIC | Age: 53
End: 2023-08-14

## 2023-08-14 NOTE — TELEPHONE ENCOUNTER
Appointment Schedule   Who are you speaking with? Patient   If it is not the patient, are they listed on an active communication consent form? N/A   Which provider is the appointment scheduled with? Emelia Szymanski PA-C   At which location is the appointment scheduled for? [facility]   When is the appointment scheduled? Please list date and time 8/29/23 @ 9am   What is the reason for this appointment? New patient consult   Did patient voice understanding of the details of this appointment? yes   Was the no show policy reviewed with patient?  Yes

## 2023-08-15 DIAGNOSIS — J45.21 MILD INTERMITTENT ASTHMATIC BRONCHITIS WITH ACUTE EXACERBATION: ICD-10-CM

## 2023-08-15 RX ORDER — ALBUTEROL SULFATE 2.5 MG/3ML
SOLUTION RESPIRATORY (INHALATION)
Qty: 180 ML | Refills: 4 | Status: SHIPPED | OUTPATIENT
Start: 2023-08-15

## 2023-08-16 ENCOUNTER — OFFICE VISIT (OUTPATIENT)
Age: 53
End: 2023-08-16
Payer: COMMERCIAL

## 2023-08-16 VITALS
TEMPERATURE: 97.5 F | HEART RATE: 97 BPM | DIASTOLIC BLOOD PRESSURE: 68 MMHG | RESPIRATION RATE: 18 BRPM | SYSTOLIC BLOOD PRESSURE: 100 MMHG | OXYGEN SATURATION: 98 % | WEIGHT: 149.6 LBS | BODY MASS INDEX: 29.22 KG/M2

## 2023-08-16 DIAGNOSIS — Z12.2 ENCOUNTER FOR SCREENING FOR LUNG CANCER: ICD-10-CM

## 2023-08-16 DIAGNOSIS — J45.30 MILD PERSISTENT ASTHMA WITHOUT COMPLICATION: Primary | ICD-10-CM

## 2023-08-16 DIAGNOSIS — K59.00 CONSTIPATION: ICD-10-CM

## 2023-08-16 DIAGNOSIS — K64.9 HEMORRHOIDS, UNSPECIFIED HEMORRHOID TYPE: ICD-10-CM

## 2023-08-16 DIAGNOSIS — F17.210 SMOKING GREATER THAN 20 PACK YEARS: ICD-10-CM

## 2023-08-16 DIAGNOSIS — K59.09 OTHER CONSTIPATION: ICD-10-CM

## 2023-08-16 PROCEDURE — 99214 OFFICE O/P EST MOD 30 MIN: CPT | Performed by: FAMILY MEDICINE

## 2023-08-16 RX ORDER — FLUTICASONE PROPIONATE 50 MCG
1 BLISTER, WITH INHALATION DEVICE INHALATION 2 TIMES DAILY
Qty: 60 BLISTER | Refills: 2 | Status: SHIPPED | OUTPATIENT
Start: 2023-08-16 | End: 2023-09-15

## 2023-08-16 RX ORDER — HYDROCORTISONE 10 MG/G
1 CREAM TOPICAL 4 TIMES DAILY PRN
Qty: 30 G | Refills: 1 | Status: SHIPPED | OUTPATIENT
Start: 2023-08-16

## 2023-08-16 RX ORDER — POLYETHYLENE GLYCOL 3350 17 G/17G
17 POWDER ORAL 2 TIMES DAILY PRN
Qty: 510 G | Refills: 4 | Status: SHIPPED | OUTPATIENT
Start: 2023-08-16

## 2023-08-16 NOTE — PROGRESS NOTES
Name: Maryse Serrano      : 1970      MRN: [id number]  Encounter Provider: Juan Moura DO  Encounter Date: 2023   Encounter department: [address]     1. Mild persistent asthma without complication-patient reports persistent symptoms despite twice daily dosing of albuterol. Will add ICS. -     fluticasone (Flovent Diskus) 50 mcg/actuation diskus inhaler; Inhale 1 puff 2 (two) times a day Rinse mouth after use. 2. Hemorrhoids, unspecified hemorrhoid type-recurrent issue in the setting of constipation. will refill analgesia and refer to specialist for further management. -     Hydrocortisone, Perianal, (Proctocort) 1 % CREA; Apply 1 application. topically 4 (four) times a day as needed (hemorroids)  -     Ambulatory Referral to Gastroenterology; Future    3. Other constipation-  Constipation is likely drug-induced. Patient on MiraLAX daily without improvement per her. History of Senokot and MiraLAX use with no improvement. We will have patient increased MiraLAX twice daily dosing as needed. -     Ambulatory Referral to Gastroenterology; Future           Subjective        Patient presents complaining of constipation. Longstanding issue. Using MiraLAX once a day is no longer working. Used to be on Senokot but even that did not help. Reports 19-day history of no bowel movement. Last bowel movement was today. Wheezing at night. Shortness of breath throughout the day. Using albuterol twice a day with minimal relief. reports history of pancreatic issues. Says that she needs a CAT scan to evaluate. Patient has CTA of the abdomen 10/2022 that was unremarkable with pertaining to pancreas or gallbladder. Reviewed study with patient. Review of Systems   Constitutional: Negative for fever. Respiratory: Positive for cough, chest tightness, shortness of breath and wheezing.     Gastrointestinal: Positive for blood in stool and constipation. Current Outpatient Medications on File Prior to Visit   Medication Sig   • albuterol (2.5 mg/3 mL) 0.083 % nebulizer solution 1 VIAL VIA NEB EVERY 6 HOURS AS NEEDED FOR WHEEZING OR SHORTNESS OF BREATH   • albuterol (PROVENTIL HFA,VENTOLIN HFA) 90 mcg/act inhaler Inhale 2 puffs every 6 (six) hours as needed for wheezing or shortness of breath   • aluminum-magnesium hydroxide-simethicone (MYLANTA) 2752-3675-992 mg/30 mL suspension Take 30 mL by mouth every 4 (four) hours as needed for indigestion or heartburn (dyspepsia)   • benzonatate (TESSALON PERLES) 100 mg capsule Take 1 capsule (100 mg total) by mouth 3 (three) times a day as needed for cough   • cholecalciferol (VITAMIN D3) 1,000 units tablet 1 TAB ORALLY EVERY MORNING DX: SUPPLEMENT   • cloZAPine (CLOZARIL) 100 mg tablet Take 1 tablet in the morning (100 mg) and Take 3 tablets in the evening (300 mg)   • clozapine (CLOZARIL) 200 MG tablet    • docusate sodium (COLACE) 100 mg capsule 1 CAP ORALLY TWICE DAILY DX: CONSTIPATION *NOT ON CYCLE-NO REFILL*   • fenofibrate (TRICOR) 145 mg tablet 1 TAB ORALLY EVERY MORNING DX:HYPERLIPIDEMIA   • fluticasone (FLONASE) 50 mcg/act nasal spray 1 spray into each nostril 2 (two) times a day   • haloperidol (HALDOL) 1 mg tablet Take 1 tablet (1 mg total) by mouth 2 (two) times a day as needed (halllucinations)   • hydrocortisone 2.5 % cream    • Incontinence Supply Disposable (Depend Underwear Sm/Med) MISC Use 3 (three) times a day as needed (incontinence)   • lamoTRIgine (LaMICtal) 25 mg tablet 100 mg 3 times a day   • lidocaine (LIDODERM) 5 % APPLY 1 PATCH TOPICALLY TO LOWER BACK EVERY MORNING AND REMOVE AT BEDTIME DX:PAIN [ON 12HRS,OFF 12HRS]   • LORazepam (ATIVAN) 1 mg tablet Take 1/2 tablet (0.5 mg) every morning and 1 tablet (1 mg) every evening.    • methocarbamol (ROBAXIN) 500 mg tablet Take 1 tablet (500 mg total) by mouth every 6 (six) hours as needed for muscle spasms   • pantoprazole (PROTONIX) 40 mg tablet 1 TAB ORALLY EVERY MORNING DX: GERD   • polyethylene glycol (GLYCOLAX) 17 GM/SCOOP 1 CAPFUL (17GM) IN 8 OUNCES OF LIQUID AND TAKE ORALLY EVERY MORNING DX:CONSTIPATION   • sorbitol 70 % Take 30 mL by mouth every hour as needed (constipation 3rd line refractory to Miralax. Take q1h until BM)   • venlafaxine (EFFEXOR-XR) 75 mg 24 hr capsule Take 3 capsules (225 mg total) by mouth daily Do not start before January 28, 2023. • [DISCONTINUED] Hydrocortisone, Perianal, (Proctocort) 1 % CREA Apply 1 application topically 4 (four) times a day as needed (hemorroids)   • cetirizine (ZyrTEC) 10 mg tablet 1 TAB ORALLY EVERY MORNING DX:ALLERGIES (Patient not taking: Reported on 8/4/2023)   • doxycycline hyclate (VIBRA-TABS) 100 mg tablet  (Patient not taking: Reported on 8/4/2023)   • haloperidol (HALDOL) 5 mg tablet Take Haldol 2.5 tablets (12.5 mg) in the morning and Take Haldol 3 tablets (15 mg) in the evening (Patient taking differently: 3 (three) times a day Take Haldol 2.5mg @8am, 3pm,  and Take Haldol 3 tablets (15 mg) in the evening.)   • melatonin 3 mg  (Patient not taking: Reported on 8/4/2023)   • nicotine polacrilex (NICORETTE) 4 mg gum Chew 4 mg as needed for smoking cessation (Patient not taking: Reported on 8/4/2023)   • polyethylene glycol (GLYCOLAX) 17 GM/SCOOP  (Patient not taking: Reported on 8/4/2023)   • Stimulant Laxative 8.6-50 MG per tablet 1 TAB ORALLY AT BEDTIME DX: CONSTIPATION (Patient not taking: Reported on 8/4/2023)       Objective     /68 (BP Location: Left arm, Patient Position: Sitting, Cuff Size: Standard)   Pulse 97   Temp 97.5 °F (36.4 °C) (Temporal)   Resp 18   Wt 67.9 kg (149 lb 9.6 oz)   LMP 01/01/2020   SpO2 98%   BMI 29.22 kg/m²     Physical Exam  HENT:      Head: Normocephalic. Eyes:      Conjunctiva/sclera: Conjunctivae normal.   Cardiovascular:      Rate and Rhythm: Normal rate and regular rhythm.    Pulmonary:      Effort: Pulmonary effort is normal. Breath sounds: Normal breath sounds. Abdominal:      General: Bowel sounds are increased. Palpations: Abdomen is soft. Tenderness: There is no abdominal tenderness. Neurological:      Mental Status: She is alert. Psychiatric:         Attention and Perception: Attention normal.         Mood and Affect: Mood normal. Affect is flat.        Tanika Acosta DO

## 2023-08-17 DIAGNOSIS — J45.21 MILD INTERMITTENT ASTHMATIC BRONCHITIS WITH ACUTE EXACERBATION: ICD-10-CM

## 2023-08-17 RX ORDER — ALBUTEROL SULFATE 90 UG/1
AEROSOL, METERED RESPIRATORY (INHALATION)
Qty: 8.5 G | Refills: 4 | Status: SHIPPED | OUTPATIENT
Start: 2023-08-17

## 2023-08-28 ENCOUNTER — TELEPHONE (OUTPATIENT)
Age: 53
End: 2023-08-28
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
## 2023-08-29 ENCOUNTER — OFFICE VISIT (OUTPATIENT)
Age: 53
End: 2023-08-29
Payer: COMMERCIAL

## 2023-08-29 ENCOUNTER — CONSULT (OUTPATIENT)
Dept: HEMATOLOGY ONCOLOGY | Facility: CLINIC | Age: 53
End: 2023-08-29
Payer: COMMERCIAL

## 2023-08-29 VITALS
TEMPERATURE: 98.2 F | WEIGHT: 151 LBS | OXYGEN SATURATION: 96 % | RESPIRATION RATE: 18 BRPM | BODY MASS INDEX: 29.64 KG/M2 | DIASTOLIC BLOOD PRESSURE: 84 MMHG | SYSTOLIC BLOOD PRESSURE: 124 MMHG | HEIGHT: 60 IN | HEART RATE: 68 BPM

## 2023-08-29 VITALS
DIASTOLIC BLOOD PRESSURE: 70 MMHG | HEART RATE: 88 BPM | HEIGHT: 60 IN | SYSTOLIC BLOOD PRESSURE: 112 MMHG | BODY MASS INDEX: 29.64 KG/M2 | OXYGEN SATURATION: 95 % | WEIGHT: 151 LBS | RESPIRATION RATE: 18 BRPM

## 2023-08-29 DIAGNOSIS — D72.9 NEUTROPHILIA: Primary | ICD-10-CM

## 2023-08-29 DIAGNOSIS — K59.09 OTHER CONSTIPATION: ICD-10-CM

## 2023-08-29 DIAGNOSIS — Z72.0 TOBACCO ABUSE: ICD-10-CM

## 2023-08-29 PROCEDURE — 99204 OFFICE O/P NEW MOD 45 MIN: CPT | Performed by: PHYSICIAN ASSISTANT

## 2023-08-29 PROCEDURE — 99244 OFF/OP CNSLTJ NEW/EST MOD 40: CPT | Performed by: INTERNAL MEDICINE

## 2023-08-29 RX ORDER — LAMOTRIGINE 100 MG/1
TABLET ORAL
COMMUNITY
Start: 2023-08-28

## 2023-08-29 NOTE — PATIENT INSTRUCTIONS
Scheduled date of colonoscopy (as of today): 9/20/23  Physician performing colonoscopy: Nancy  Location of colonoscopy: [place]  Bowel prep reviewed with patient: Miralax  Instructions reviewed with patient by: Vince RODGERS  Clearances:
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
## 2023-08-30 LAB
DME PARACHUTE DELIVERY DATE ACTUAL: NORMAL
DME PARACHUTE DELIVERY DATE REQUESTED: NORMAL
DME PARACHUTE ITEM DESCRIPTION: NORMAL
DME PARACHUTE ORDER STATUS: NORMAL
DME PARACHUTE SUPPLIER NAME: NORMAL
DME PARACHUTE SUPPLIER PHONE: NORMAL

## 2023-09-06 DIAGNOSIS — K27.9 PEPTIC ULCER DISEASE: ICD-10-CM

## 2023-09-06 DIAGNOSIS — K59.00 CONSTIPATION: ICD-10-CM

## 2023-09-06 RX ORDER — DOCUSATE SODIUM 100 MG/1
CAPSULE, LIQUID FILLED ORAL
Qty: 56 CAPSULE | Refills: 4 | Status: SHIPPED | OUTPATIENT
Start: 2023-09-06

## 2023-09-06 RX ORDER — PANTOPRAZOLE SODIUM 40 MG/1
TABLET, DELAYED RELEASE ORAL
Qty: 28 TABLET | Refills: 4 | Status: SHIPPED | OUTPATIENT
Start: 2023-09-06

## 2023-09-20 ENCOUNTER — ANESTHESIA (OUTPATIENT)
Dept: GASTROENTEROLOGY | Facility: HOSPITAL | Age: 53
End: 2023-09-20

## 2023-09-20 ENCOUNTER — APPOINTMENT (OUTPATIENT)
Dept: LAB | Facility: HOSPITAL | Age: 53
End: 2023-09-20
Payer: COMMERCIAL

## 2023-09-20 ENCOUNTER — ANESTHESIA EVENT (OUTPATIENT)
Dept: GASTROENTEROLOGY | Facility: HOSPITAL | Age: 53
End: 2023-09-20

## 2023-09-20 ENCOUNTER — HOSPITAL ENCOUNTER (OUTPATIENT)
Dept: GASTROENTEROLOGY | Facility: HOSPITAL | Age: 53
Setting detail: OUTPATIENT SURGERY
Discharge: HOME | End: 2023-09-20
Payer: COMMERCIAL

## 2023-09-20 VITALS
SYSTOLIC BLOOD PRESSURE: 132 MMHG | WEIGHT: 146.61 LBS | RESPIRATION RATE: 20 BRPM | OXYGEN SATURATION: 97 % | HEART RATE: 89 BPM | TEMPERATURE: 96.4 F | BODY MASS INDEX: 28.78 KG/M2 | HEIGHT: 60 IN | DIASTOLIC BLOOD PRESSURE: 79 MMHG

## 2023-09-20 DIAGNOSIS — K59.09 OTHER CONSTIPATION: ICD-10-CM

## 2023-09-20 PROBLEM — F17.200 SMOKING: Status: ACTIVE | Noted: 2020-08-17

## 2023-09-20 PROBLEM — IMO0001 SMOKING: Status: ACTIVE | Noted: 2020-08-17

## 2023-09-20 PROCEDURE — 88305 TISSUE EXAM BY PATHOLOGIST: CPT | Performed by: PATHOLOGY

## 2023-09-20 RX ORDER — SODIUM CHLORIDE, SODIUM LACTATE, POTASSIUM CHLORIDE, CALCIUM CHLORIDE 600; 310; 30; 20 MG/100ML; MG/100ML; MG/100ML; MG/100ML
INJECTION, SOLUTION INTRAVENOUS CONTINUOUS PRN
Status: DISCONTINUED | OUTPATIENT
Start: 2023-09-20 | End: 2023-09-20

## 2023-09-20 RX ORDER — PROPOFOL 10 MG/ML
INJECTION, EMULSION INTRAVENOUS AS NEEDED
Status: DISCONTINUED | OUTPATIENT
Start: 2023-09-20 | End: 2023-09-20

## 2023-09-20 RX ORDER — LIDOCAINE HYDROCHLORIDE 20 MG/ML
INJECTION, SOLUTION EPIDURAL; INFILTRATION; INTRACAUDAL; PERINEURAL AS NEEDED
Status: DISCONTINUED | OUTPATIENT
Start: 2023-09-20 | End: 2023-09-20

## 2023-09-20 RX ADMIN — LIDOCAINE HYDROCHLORIDE 100 MG: 20 INJECTION, SOLUTION EPIDURAL; INFILTRATION; INTRACAUDAL; PERINEURAL at 09:55

## 2023-09-20 RX ADMIN — SODIUM CHLORIDE, SODIUM LACTATE, POTASSIUM CHLORIDE, AND CALCIUM CHLORIDE: .6; .31; .03; .02 INJECTION, SOLUTION INTRAVENOUS at 09:53

## 2023-09-20 RX ADMIN — PROPOFOL 20 MG: 10 INJECTION, EMULSION INTRAVENOUS at 10:03

## 2023-09-20 RX ADMIN — PROPOFOL 50 MG: 10 INJECTION, EMULSION INTRAVENOUS at 10:09

## 2023-09-20 RX ADMIN — PROPOFOL 100 MG: 10 INJECTION, EMULSION INTRAVENOUS at 09:55

## 2023-09-20 RX ADMIN — PROPOFOL 20 MG: 10 INJECTION, EMULSION INTRAVENOUS at 10:07

## 2023-09-20 RX ADMIN — PROPOFOL 20 MG: 10 INJECTION, EMULSION INTRAVENOUS at 09:58

## 2023-09-20 RX ADMIN — PROPOFOL 20 MG: 10 INJECTION, EMULSION INTRAVENOUS at 10:01

## 2023-09-20 RX ADMIN — PROPOFOL 20 MG: 10 INJECTION, EMULSION INTRAVENOUS at 10:05

## 2023-09-20 NOTE — ANESTHESIA PREPROCEDURE EVALUATION
Procedure:  COLONOSCOPY    Relevant Problems   ANESTHESIA (within normal limits)      CARDIO   (+) Hyperlipidemia      ENDO (within normal limits)      GI/HEPATIC  NPO confirmed  BMI 28.6   (+) Gastroesophageal reflux disease      /RENAL (within normal limits)      HEMATOLOGY (within normal limits)      MUSCULOSKELETAL   (+) Chronic midline low back pain without sciatica   (+) Degenerative disc disease, lumbar      NEURO/PSYCH   (+) Chronic midline low back pain without sciatica   (+) LYNN (generalized anxiety disorder)   (+) Other headache syndrome   (+) Post-traumatic stress disorder, chronic      PULMONARY   (+) Emphysema lung (HCC)   (+) Mild intermittent asthma without complication   (+) Smoking   (-) URI (upper respiratory infection) (recent cough 9/9 - sinobronchitis, finished abx yest (augmentin), s/p prednisone, no longer productive and closer to baseline cough, CXR at time wnl)      Other   (+) Schizoaffective disorder, bipolar type (HCC)      Allergies   Allergen Reactions   • Naproxen Itching, Swelling and Edema   • Latex Itching   • Lithium Swelling   • Tramadol Swelling   • Depakote [Valproic Acid] Swelling and Rash     Social History     Tobacco Use   • Smoking status: Every Day     Packs/day: 1.50     Years: 30.00     Total pack years: 45.00     Types: Cigarettes   • Smokeless tobacco: Never   Vaping Use   • Vaping Use: Never used   Substance Use Topics   • Alcohol use: Not Currently     Comment: pt denies recent alcohol use   • Drug use: Not Currently     Comment: none currently, drug use cocaine, meth     Current Outpatient Medications   Medication Instructions   • albuterol (2.5 mg/3 mL) 0.083 % nebulizer solution 1 VIAL VIA NEB EVERY 6 HOURS AS NEEDED FOR WHEEZING OR SHORTNESS OF BREATH   • albuterol (PROVENTIL HFA,VENTOLIN HFA) 90 mcg/act inhaler 2 PUFFS ORALLY EVERY 6 HOURS AS NEEDED FOR WHEEZE/ SHORTNESS OF BREATH   • aluminum-magnesium hydroxide-simethicone (MYLANTA) 7648-0891-606 mg/30 mL suspension 30 mL, Oral, Every 4 hours PRN   • benzonatate (TESSALON PERLES) 100 mg, Oral, 3 times daily PRN   • cetirizine (ZyrTEC) 10 mg tablet 1 TAB ORALLY EVERY MORNING DX:ALLERGIES   • cholecalciferol (VITAMIN D3) 1,000 units tablet 1 TAB ORALLY EVERY MORNING DX: SUPPLEMENT   • cloZAPine (CLOZARIL) 100 mg tablet Take 1 tablet in the morning (100 mg) and Take 3 tablets in the evening (300 mg)   • clozapine (CLOZARIL) 200 MG tablet No dose, route, or frequency recorded. • docusate sodium (COLACE) 100 mg capsule 1 CAP ORALLY TWICE DAILY DX: CONSTIPATION   • doxycycline hyclate (VIBRA-TABS) 100 mg tablet No dose, route, or frequency recorded. • fenofibrate (TRICOR) 145 mg tablet 1 TAB ORALLY EVERY MORNING DX:HYPERLIPIDEMIA   • fluticasone (FLONASE) 50 mcg/act nasal spray 1 spray, Nasal, 2 times daily   • fluticasone (Flovent Diskus) 50 mcg/actuation diskus inhaler 1 puff, Inhalation, 2 times daily, Rinse mouth after use. • haloperidol (HALDOL) 5 mg tablet Take Haldol 2.5 tablets (12.5 mg) in the morning and Take Haldol 3 tablets (15 mg) in the evening   • haloperidol (HALDOL) 1 mg, Oral, 2 times daily PRN   • hydrocortisone 2.5 % cream No dose, route, or frequency recorded. • Hydrocortisone, Perianal, (Proctocort) 1 % CREA 1 application. , Topical, 4 times daily PRN   • Incontinence Supply Disposable (Depend Underwear Sm/Med) MISC Does not apply, 3 times daily PRN   • lamoTRIgine (LaMICtal) 100 mg tablet No dose, route, or frequency recorded. • lamoTRIgine (LAMICTAL) 100 mg, 3 times a day   • lidocaine (LIDODERM) 5 % APPLY 1 PATCH TOPICALLY TO LOWER BACK EVERY MORNING AND REMOVE AT BEDTIME DX:PAIN [ON 12HRS,OFF 12HRS]   • LORazepam (ATIVAN) 1 mg tablet Take 1/2 tablet (0.5 mg) every morning and 1 tablet (1 mg) every evening. • melatonin 3 mg No dose, route, or frequency recorded.    • methocarbamol (ROBAXIN) 500 mg, Oral, Every 6 hours PRN   • nicotine polacrilex (NICORETTE) 4 mg, As needed   • pantoprazole (PROTONIX) 40 mg tablet 1 TAB ORALLY EVERY MORNING DX: GERD   • polyethylene glycol (GLYCOLAX) 17 g, Oral, 2 times daily PRN   • sorbitol 70 % 30 mL, Oral, Every 1 hour PRN   • venlafaxine (EFFEXOR-XR) 225 mg, Oral, Daily     Lab Results   Component Value Date    WBC 11.00 (H) 07/25/2023    HGB 12.1 07/25/2023    HCT 38.5 07/25/2023     (H) 07/25/2023    SODIUM 139 01/10/2023    K 4.0 01/10/2023     01/10/2023    CO2 29 01/10/2023    BUN 12 01/10/2023    CREATININE 0.70 01/10/2023    GLUC 117 (H) 01/10/2023    HGBA1C 5.6 12/02/2022    AST 19 01/10/2023    ALT 19 01/10/2023    ALKPHOS 77 01/10/2023    TBILI 0.47 01/10/2023    ALB 4.1 01/10/2023    PROTIME 11.5 (L) 03/09/2021    PTT 27 08/20/2019    INR 0.85 03/09/2021     Vitals:    09/20/23 0848   BP: 119/75   Pulse: 94   Resp: 16   Temp: 97.8 °F (36.6 °C)   SpO2: 96%     EKG 1/12/23  Sinus tachycardia  Low voltage QRS  Possible Inferior infarct (cited on or before 03-JAN-2023)  Abnormal ECG  When compared with ECG of 11-JAN-2023 14:12,  No significant change was found  Confirmed by Alex Lawton ([id number]) on 1/12/2023 9:18:44 PM    Physical Exam    Airway    Mallampati score: II  TM Distance: >3 FB  Neck ROM: full     Dental       Cardiovascular  Cardiovascular exam normal    Pulmonary  Pulmonary exam normal     Other Findings        Anesthesia Plan  ASA Score- 3     Anesthesia Type- IV sedation with anesthesia with ASA Monitors. Additional Monitors:   Airway Plan:     Comment: O2 mask, natural airway, EtCO2 monitor. Risks discussed including awareness, aspiration, drug reactions and conversion to GA. Nikos Lute Plan Factors-Exercise tolerance (METS): >4 METS. Chart reviewed. EKG reviewed. Existing labs reviewed. Patient summary reviewed. Patient is a current smoker. Patient instructed to abstain from smoking on day of procedure. Patient did not smoke on day of surgery. Induction- intravenous.     Postoperative Plan- Informed Consent- Anesthetic plan and risks discussed with patient. I personally reviewed this patient with the CRNA. Discussed and agreed on the Anesthesia Plan with the CRNA. Stalin Ibarra

## 2023-09-20 NOTE — INTERVAL H&P NOTE
H&P reviewed. After examining the patient I find no changes in the patients condition since the H&P had been written.     Vitals:    09/20/23 0848   BP: 119/75   Pulse: 94   Resp: 16   Temp: 97.8 °F (36.6 °C)   SpO2: 96%

## 2023-09-20 NOTE — ANESTHESIA POSTPROCEDURE EVALUATION
Post-Op Assessment Note    CV Status:  Stable  Pain Score: 0    Pain management: adequate     Mental Status:  Alert   Hydration Status:  Euvolemic   PONV Controlled:  None   Airway Patency:  Patent      Post Op Vitals Reviewed: Yes      Staff: Anesthesiologist, CRNA         No notable events documented.     /58 (09/20/23 1015)    Temp (!) 96.4 °F (35.8 °C) (09/20/23 1015)    Pulse 91 (09/20/23 1015)   Resp (!) 24 (09/20/23 1015)    SpO2 94 % (09/20/23 1015)
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
## 2023-09-25 ENCOUNTER — TELEPHONE (OUTPATIENT)
Age: 53
End: 2023-09-25

## 2023-09-25 PROCEDURE — 88305 TISSUE EXAM BY PATHOLOGIST: CPT | Performed by: PATHOLOGY

## 2023-09-25 NOTE — TELEPHONE ENCOUNTER
----- Message from Isis Newman MD sent at 9/25/2023 12:21 PM EDT -----  Hi can we please call patient and let her know biopsies show mild non-specific inflammation. This is benign and require no treatment at this time. If she would like to discuss further about her constipation she can follow up with her Gi provider Jacque Macedo. She need repeat colonoscopy in 3 years for fair bowel prep.

## 2023-09-25 NOTE — TELEPHONE ENCOUNTER
MONA with results and to call back and schedule a follow up appt. With Haja Manuel To discuss further treatment of her  constipation.

## 2023-09-26 ENCOUNTER — ANNUAL EXAM (OUTPATIENT)
Dept: OBGYN CLINIC | Facility: CLINIC | Age: 53
End: 2023-09-26
Payer: COMMERCIAL

## 2023-09-26 VITALS
SYSTOLIC BLOOD PRESSURE: 118 MMHG | HEIGHT: 60 IN | WEIGHT: 152 LBS | BODY MASS INDEX: 29.84 KG/M2 | DIASTOLIC BLOOD PRESSURE: 72 MMHG

## 2023-09-26 DIAGNOSIS — R21 VULVAR RASH: Primary | ICD-10-CM

## 2023-09-26 DIAGNOSIS — Z11.3 SCREEN FOR STD (SEXUALLY TRANSMITTED DISEASE): ICD-10-CM

## 2023-09-26 DIAGNOSIS — R35.1 NOCTURIA MORE THAN TWICE PER NIGHT: ICD-10-CM

## 2023-09-26 LAB
BV WHIFF TEST VAG QL: ABNORMAL
CLUE CELLS SPEC QL WET PREP: ABNORMAL
PH SMN: 4.5 [PH]
T VAGINALIS VAG QL WET PREP: ABNORMAL
YEAST VAG QL WET PREP: ABNORMAL

## 2023-09-26 PROCEDURE — 87210 SMEAR WET MOUNT SALINE/INK: CPT | Performed by: OBSTETRICS & GYNECOLOGY

## 2023-09-26 PROCEDURE — 99213 OFFICE O/P EST LOW 20 MIN: CPT | Performed by: OBSTETRICS & GYNECOLOGY

## 2023-09-26 RX ORDER — FLUCONAZOLE 150 MG/1
TABLET ORAL
Qty: 2 TABLET | Refills: 0 | Status: SHIPPED | OUTPATIENT
Start: 2023-09-26 | End: 2023-09-29

## 2023-09-26 RX ORDER — NYSTATIN AND TRIAMCINOLONE ACETONIDE 100000; 1 [USP'U]/G; MG/G
OINTMENT TOPICAL 2 TIMES DAILY
Qty: 30 G | Refills: 0 | Status: ON HOLD | OUTPATIENT
Start: 2023-09-26 | End: 2023-10-08 | Stop reason: SDUPTHER
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
## 2023-09-26 NOTE — PROGRESS NOTES
47year old female here today for vaginal out break and vaginal discharge . Patient think she has a yeast infection . Patient is not currently sexually active at this time .

## 2023-09-26 NOTE — PATIENT INSTRUCTIONS
Change soaps to Dove white bar soap only for vulvar cleansing  Apply thin layer of Vaseline throughout the day for barrier protection to vulvar tissue  Use Mycolog 2 times a day, apply a thin layer to entire vulvar area  Diflucan take 1 today and repeat in 3 days  Referral to urology given  Perform Kegel exercises 20-30 a day, directions are provided      Kegel Exercises for Women   AMBULATORY CARE:   Kegel exercises  help strengthen your pelvic muscles. Pelvic muscles hold your pelvic organs, such as your bladder and uterus, in place. Kegel exercises help prevent or control certain conditions, such as urine incontinence (leakage) or uterine prolapse. Call your doctor or physical therapist if:   You cannot feel your muscles tighten or relax. You continue to leak urine. You have questions or concerns about your condition or care. Use the correct muscles:  Pelvic muscles are the muscles you use to control urine flow. To target these muscles, stop and start the flow of urine several times. This will help you become familiar with how it feels to tighten and relax these muscles. How to do Kegel exercises:   Get into a comfortable position. You may lie down, stand up, or sit down to do these exercises. When you first try to do these exercises, it may be easier if you lie down. Tighten or squeeze your pelvic muscles slowly. It may feel like you are trying to hold back urine or gas. Hold this position for 3 seconds. Relax for 3 seconds. Repeat this cycle 10 times. Do not hold your breath when you do Kegel exercises. Keep your stomach, back, and leg muscles relaxed. Do 10 sets of Kegel exercises, at least 3 times a day. When you know how to do Kegel exercises, use different positions. This will help to strengthen your pelvic muscles as much as possible. You can do these exercises while you lie on the floor, watch TV, or while you stand.  Tighten your pelvic muscles before you sneeze, cough, or lift to prevent urine leakage. You may notice improved bladder control within about 6 weeks. Follow up with your doctor or physical therapist as directed:  Write down your questions so you remember to ask them during your visits. © Copyright Jonelle Leigh 2023 Information is for End User's use only and may not be sold, redistributed or otherwise used for commercial purposes. The above information is an  only. It is not intended as medical advice for individual conditions or treatments. Talk to your doctor, nurse or pharmacist before following any medical regimen to see if it is safe and effective for you. Urinary Incontinence   WHAT YOU NEED TO KNOW:   What is urinary incontinence (UI)? UI is loss of bladder control. UI develops because your bladder cannot store or empty urine properly. The 3 most common types of UI are stress incontinence, urge incontinence, or both. What are the signs and symptoms of UI? You feel like your bladder does not empty completely when you urinate. You urinate often and need to urinate immediately. You leak urine when you sleep, or you wake up with the urge to urinate. You leak urine when you cough, sneeze, exercise, or laugh. How is UI treated? Medicines  can help strengthen your bladder control. Electrical stimulation  is used to send a small amount of electrical energy to your pelvic floor muscles. This helps control your bladder function. Electrodes may be placed outside your body or in your rectum. For women, the electrodes may be placed in the vagina. A bulking agent  may be injected into the wall of your urethra to make it thicker. This helps keep your urethra closed and decreases urine leakage. Devices  such as a clamp, pessary, or tampon may help stop urine leaks. Ask your healthcare provider for more information about these and other devices. Surgery  may be needed if other treatments do not work.  Several types of surgery can help improve your bladder control. Ask your provider for more information about the surgery you may need. How can I manage my symptoms? Do pelvic muscle exercises often. Your pelvic muscles help you stop urinating. Squeeze these muscles tight for 5 seconds, then relax for 5 seconds. Gradually work up to squeezing for 10 seconds. Do 3 sets of 15 repetitions a day, or as directed. This will help strengthen your pelvic muscles and improve bladder control. Keep a UI record. Write down how often you leak urine and how much you leak. Make a note of what you were doing when you leaked urine. Train your bladder. Go to the bathroom at set times, such as every 2 hours, even if you do not feel the urge to go. You can also try to hold your urine when you feel the urge to go. For example, hold your urine for 5 minutes when you feel the urge to go. As that becomes easier, hold your urine for 10 minutes. Drink liquids as directed. Ask your healthcare provider how much liquid to drink each day and which liquids are best for you. You may need to limit the amount of liquid you drink to help control your urine leakage. Do not drink any liquid right before you go to bed. Limit or do not have drinks that contain caffeine or alcohol. Prevent constipation. Eat a variety of high-fiber foods. Good examples are high-fiber cereals, beans, vegetables, and whole-grain breads. Prune juice may help make your bowel movement softer. Walking is the best way to trigger your intestines to have a bowel movement. Exercise regularly and maintain a healthy weight. Ask your provider how much you should weigh and about the best exercise plan for you. Weight loss and exercise will decrease pressure on your bladder and help you control your leakage. Ask your provider to help you create a weight loss plan, if needed. Use a catheter as directed  to help empty your bladder.  A catheter is a tiny, plastic tube that is put into your bladder to drain your urine. Your provider may tell you to use a catheter to prevent your bladder from getting too full and leaking urine. Go to behavior therapy as directed. Behavior therapy may be used to help you learn to control your urge to urinate. When should I seek immediate care? You have severe pain. You are confused or cannot think clearly. You see blood in your urine. You have pain when you urinate. You have new or worse pain, even after treatment. When should I call my doctor? You have a fever. Your mouth feels dry or you have vision changes. Your urine is cloudy or smells bad. You have questions or concerns about your condition or care. CARE AGREEMENT:   You have the right to help plan your care. Learn about your health condition and how it may be treated. Discuss treatment options with your healthcare providers to decide what care you want to receive. You always have the right to refuse treatment. The above information is an  only. It is not intended as medical advice for individual conditions or treatments. Talk to your doctor, nurse or pharmacist before following any medical regimen to see if it is safe and effective for you. © Copyright [facility] [employer] 2023 Information is for End User's use only and may not be sold, redistributed or otherwise used for commercial purposes. Yeast Infection   AMBULATORY CARE:   A yeast infection , or vaginal candidiasis, is a common vaginal infection. A yeast infection is caused by a fungus, or yeast-like germ. Fungi are normally found in your vagina. Too many fungi can cause an infection. Common signs and symptoms: Thick, white, cheese-like discharge from your vagina    Itching, swelling, and redness in your vagina    Pain or burning when you urinate    Pain during sexual intercourse    Call your doctor or gynecologist if:   You have a fever and chills. You develop abdominal or pelvic pain.     Your discharge is bloody and it is not your monthly period. Your signs and symptoms get worse, even after treatment. You have questions or concerns about your condition or care. Treatment for a yeast infection  includes medicines to treat the fungal infection and decrease inflammation. The medicine may be a pill, cream, ointment, or vaginal tablet or suppository. Keep your vagina healthy:   Clean your genital area with mild soap and warm water each day. Do not get soap inside your vagina. Gently dry the area after washing. Do not use hot tubs. The heat and moisture from hot tubs can increase your risk for another yeast infection. Always wipe from front to back  after you use the toilet. This prevents spreading bacteria from your rectal area into your vagina. Do not wear tight-fitting clothes or undergarments  for long periods of time. Wear cotton underwear during the day. Cotton helps keep your genital area dry and does not hold in warmth or moisture. Do not wear underwear at night. Do not douche  or use feminine hygiene sprays or bubble bath. Do not use pads or tampons that are scented, or colored or perfumed toilet paper. Do not have sex until your symptoms go away. Have your partner wear a condom until you complete your course of medication. Ask your healthcare provider about birth control options if necessary. Condoms have latex and diaphragms have gel that kills sperm. Both of these may irritate your genital area. Follow up with your doctor or gynecologist as directed:  Write down your questions so you remember to ask them during your visits. © Copyright Geeta Mcdonald 2023 Information is for End User's use only and may not be sold, redistributed or otherwise used for commercial purposes. The above information is an  only. It is not intended as medical advice for individual conditions or treatments.  Talk to your doctor, nurse or pharmacist before following any medical regimen to see if it is safe and effective for you. Perineal Hygiene      Your vaginal naturally takes care of its self, it is a self washing system, the less you mess the healthier it will be     No soaps or feminine wash to the vulva, these products can cause dermitis, bacterial infections and other vulvar problems. Use only water to cleanse, or water with Dove or [employer] if necessary. No scented lotions or products are advised in or near your vulva. Use only coconut oil for moisture if needed. No douching this may cause imbalance in your vaginal PH and further issues. If you wear panty liners, you may apply a thin coating of Vaseline, A&D ointment or coconut oil to the vulvar tissues as a skin barrier     Cotton underware, loose fitting clothing  Only perfume-free, dye-free laundry detergent, use a second rinse cycle   Avoid fabric softeners/dryer sheets. Partner should avoid the same products as well. Over the counter probiotic to restore vaginal ayan may be helpful as well, take daily. You may also look into Boric Acid vaginal suppositories to restore vaginal PH balance for up to 2 weeks as directed on the box. You may not use these if you are pregnant      For vaginal dryness: You may use:     Coconut oil (organic, pure, unscented) as needed for moisture or lubrication. ( Do not use if allergic)       Replens moisture restore external comfort gel daily ( use as directed on the box)        Replens long lasting vaginal moisturizer  ( use as directed on the box)         For Vaginal Lubrication:          You may use:     Coconut oil (organic, pure, unscented) as a lubricant or another scent-free lubricant (Astroglide, Uberlube) if needed.   Do not use coconut oil or silicone if using a condom as this may break down the integrity of the condom and cause an unplanned pregnancy              Do not use coconut oil if allergic               Replens silky smooth lubricant, premium silicone based lubricant for intercourse. ( use as directed, a small amount will provide an enhanced natural feeling)     Any premium over the counter vaginal lubricant water or silicone based. Silicone based will have more staying power. Dermatitis   WHAT YOU NEED TO KNOW:   Dermatitis is skin inflammation. You may have an itchy rash, redness, or swelling. You may also have bumps or blisters that crust over or ooze clear fluid. Dermatitis can be caused by allergens such as dust mites, pet dander, pollen, and certain foods. It can also develop when something touches your skin and irritates it or causes an allergic reaction. Examples include soaps, chemicals, latex, and poison ivy. DISCHARGE INSTRUCTIONS:   Call your local emergency number (919 in the [address]) or have someone call if:   You have symptoms of anaphylaxis, such as sudden trouble breathing, throat swelling, or feeling dizzy or lightheaded. Return to the emergency department if:   You develop a fever or have red streaks going up your arm or leg. Your rash gets more swollen, red, or hot. Call your doctor or dermatologist if:   Your skin blisters, oozes white or yellow pus, or has a foul-smelling discharge. Your rash spreads or does not get better, even after treatment. You have questions or concerns about your condition or care. Medicines:   Medicines  help decrease itching and inflammation, or treat a bacterial infection. They may be given as a topical cream, shot, or a pill. Take your medicine as directed. Contact your healthcare provider if you think your medicine is not helping or if you have side effects. Tell your provider if you are allergic to any medicine. Keep a list of the medicines, vitamins, and herbs you take. Include the amounts, and when and why you take them. Bring the list or the pill bottles to follow-up visits. Carry your medicine list with you in case of an emergency.     Manage dermatitis:   Apply a cool compress to your rash. This will help soothe your skin. Apply lotions or creams to the area. These help keep your skin moist and decrease itching. Apply the lotion or cream right after a lukewarm bath or shower when your skin is still damp. Use products that do not contain dye or a scent. Avoid skin irritants. Examples include makeup, hair products, soaps, and cleansers. Use products that do not contain a scent or dye. Follow up with your doctor or dermatologist as directed:  Write down your questions so you remember to ask them during your visits. © Copyright [facility] [employer] 2023 Information is for End User's use only and may not be sold, redistributed or otherwise used for commercial purposes. The above information is an  only. It is not intended as medical advice for individual conditions or treatments. Talk to your doctor, nurse or pharmacist before following any medical regimen to see if it is safe and effective for you.

## 2023-10-01 ENCOUNTER — HOSPITAL ENCOUNTER (EMERGENCY)
Facility: HOSPITAL | Age: 53
End: 2023-10-01
Attending: EMERGENCY MEDICINE
Payer: COMMERCIAL

## 2023-10-01 VITALS
HEART RATE: 91 BPM | DIASTOLIC BLOOD PRESSURE: 76 MMHG | OXYGEN SATURATION: 95 % | SYSTOLIC BLOOD PRESSURE: 136 MMHG | RESPIRATION RATE: 17 BRPM | TEMPERATURE: 98 F

## 2023-10-01 DIAGNOSIS — R45.851 SUICIDAL IDEATIONS: Primary | ICD-10-CM

## 2023-10-01 LAB
ALBUMIN SERPL BCP-MCNC: 4.1 G/DL (ref 3.5–5)
ALP SERPL-CCNC: 80 U/L (ref 34–104)
ALT SERPL W P-5'-P-CCNC: 14 U/L (ref 7–52)
AMPHETAMINES SERPL QL SCN: NEGATIVE
ANION GAP SERPL CALCULATED.3IONS-SCNC: 6 MMOL/L
AST SERPL W P-5'-P-CCNC: 14 U/L (ref 13–39)
ATRIAL RATE: 81 BPM
BARBITURATES UR QL: NEGATIVE
BASOPHILS # BLD AUTO: 0.03 THOUSANDS/ÂΜL (ref 0–0.1)
BASOPHILS NFR BLD AUTO: 0 % (ref 0–1)
BENZODIAZ UR QL: NEGATIVE
BILIRUB SERPL-MCNC: 0.23 MG/DL (ref 0.2–1)
BILIRUB UR QL STRIP: NEGATIVE
BUN SERPL-MCNC: 6 MG/DL (ref 5–25)
CALCIUM SERPL-MCNC: 9.1 MG/DL (ref 8.4–10.2)
CHLORIDE SERPL-SCNC: 100 MMOL/L (ref 96–108)
CLARITY UR: CLEAR
CO2 SERPL-SCNC: 28 MMOL/L (ref 21–32)
COCAINE UR QL: NEGATIVE
COLOR UR: COLORLESS
CREAT SERPL-MCNC: 0.55 MG/DL (ref 0.6–1.3)
EOSINOPHIL # BLD AUTO: 0.32 THOUSAND/ÂΜL (ref 0–0.61)
EOSINOPHIL NFR BLD AUTO: 3 % (ref 0–6)
ERYTHROCYTE [DISTWIDTH] IN BLOOD BY AUTOMATED COUNT: 14.8 % (ref 11.6–15.1)
ETHANOL EXG-MCNC: 0 MG/DL
FLUAV RNA RESP QL NAA+PROBE: NEGATIVE
FLUBV RNA RESP QL NAA+PROBE: NEGATIVE
GFR SERPL CREATININE-BSD FRML MDRD: 108 ML/MIN/1.73SQ M
GLUCOSE SERPL-MCNC: 105 MG/DL (ref 65–140)
GLUCOSE UR STRIP-MCNC: NEGATIVE MG/DL
HCT VFR BLD AUTO: 37.8 % (ref 34.8–46.1)
HGB BLD-MCNC: 12.4 G/DL (ref 11.5–15.4)
HGB UR QL STRIP.AUTO: NEGATIVE
IMM GRANULOCYTES # BLD AUTO: 0.05 THOUSAND/UL (ref 0–0.2)
IMM GRANULOCYTES NFR BLD AUTO: 1 % (ref 0–2)
KETONES UR STRIP-MCNC: NEGATIVE MG/DL
LEUKOCYTE ESTERASE UR QL STRIP: NEGATIVE
LYMPHOCYTES # BLD AUTO: 2.18 THOUSANDS/ÂΜL (ref 0.6–4.47)
LYMPHOCYTES NFR BLD AUTO: 23 % (ref 14–44)
MCH RBC QN AUTO: 28.1 PG (ref 26.8–34.3)
MCHC RBC AUTO-ENTMCNC: 32.8 G/DL (ref 31.4–37.4)
MCV RBC AUTO: 86 FL (ref 82–98)
METHADONE UR QL: NEGATIVE
MONOCYTES # BLD AUTO: 0.54 THOUSAND/ÂΜL (ref 0.17–1.22)
MONOCYTES NFR BLD AUTO: 6 % (ref 4–12)
NEUTROPHILS # BLD AUTO: 6.59 THOUSANDS/ÂΜL (ref 1.85–7.62)
NEUTS SEG NFR BLD AUTO: 67 % (ref 43–75)
NITRITE UR QL STRIP: NEGATIVE
NRBC BLD AUTO-RTO: 0 /100 WBCS
OPIATES UR QL SCN: NEGATIVE
OXYCODONE+OXYMORPHONE UR QL SCN: NEGATIVE
P AXIS: 68 DEGREES
PCP UR QL: NEGATIVE
PH UR STRIP.AUTO: 6.5 [PH]
PLATELET # BLD AUTO: 325 THOUSANDS/UL (ref 149–390)
PMV BLD AUTO: 8.5 FL (ref 8.9–12.7)
POTASSIUM SERPL-SCNC: 3.7 MMOL/L (ref 3.5–5.3)
PR INTERVAL: 160 MS
PROT SERPL-MCNC: 7.1 G/DL (ref 6.4–8.4)
PROT UR STRIP-MCNC: NEGATIVE MG/DL
QRS AXIS: 56 DEGREES
QRSD INTERVAL: 80 MS
QT INTERVAL: 422 MS
QTC INTERVAL: 490 MS
RBC # BLD AUTO: 4.41 MILLION/UL (ref 3.81–5.12)
RSV RNA RESP QL NAA+PROBE: NEGATIVE
SARS-COV-2 RNA RESP QL NAA+PROBE: NEGATIVE
SODIUM SERPL-SCNC: 134 MMOL/L (ref 135–147)
SP GR UR STRIP.AUTO: 1 (ref 1–1.03)
T WAVE AXIS: 69 DEGREES
THC UR QL: NEGATIVE
TSH SERPL DL<=0.05 MIU/L-ACNC: 2.35 UIU/ML (ref 0.45–4.5)
UROBILINOGEN UR STRIP-ACNC: <2 MG/DL
VENTRICULAR RATE: 81 BPM
WBC # BLD AUTO: 9.71 THOUSAND/UL (ref 4.31–10.16)

## 2023-10-01 PROCEDURE — 36415 COLL VENOUS BLD VENIPUNCTURE: CPT | Performed by: EMERGENCY MEDICINE

## 2023-10-01 PROCEDURE — 84443 ASSAY THYROID STIM HORMONE: CPT | Performed by: EMERGENCY MEDICINE

## 2023-10-01 PROCEDURE — 80053 COMPREHEN METABOLIC PANEL: CPT | Performed by: EMERGENCY MEDICINE

## 2023-10-01 PROCEDURE — 81003 URINALYSIS AUTO W/O SCOPE: CPT | Performed by: EMERGENCY MEDICINE

## 2023-10-01 PROCEDURE — 80307 DRUG TEST PRSMV CHEM ANLYZR: CPT | Performed by: EMERGENCY MEDICINE

## 2023-10-01 PROCEDURE — 85025 COMPLETE CBC W/AUTO DIFF WBC: CPT | Performed by: EMERGENCY MEDICINE

## 2023-10-01 PROCEDURE — 82075 ASSAY OF BREATH ETHANOL: CPT | Performed by: EMERGENCY MEDICINE

## 2023-10-01 PROCEDURE — 99285 EMERGENCY DEPT VISIT HI MDM: CPT | Performed by: EMERGENCY MEDICINE

## 2023-10-01 PROCEDURE — 93005 ELECTROCARDIOGRAM TRACING: CPT

## 2023-10-01 PROCEDURE — 93010 ELECTROCARDIOGRAM REPORT: CPT | Performed by: INTERNAL MEDICINE

## 2023-10-01 PROCEDURE — 0241U HB NFCT DS VIR RESP RNA 4 TRGT: CPT | Performed by: EMERGENCY MEDICINE

## 2023-10-01 PROCEDURE — 99285 EMERGENCY DEPT VISIT HI MDM: CPT

## 2023-10-01 RX ORDER — RISPERIDONE 0.25 MG/1
0.5 TABLET ORAL
Status: CANCELLED | OUTPATIENT
Start: 2023-10-01

## 2023-10-01 RX ORDER — RISPERIDONE 1 MG/1
1 TABLET ORAL
Status: CANCELLED | OUTPATIENT
Start: 2023-10-01

## 2023-10-01 RX ORDER — MAGNESIUM HYDROXIDE/ALUMINUM HYDROXICE/SIMETHICONE 120; 1200; 1200 MG/30ML; MG/30ML; MG/30ML
30 SUSPENSION ORAL EVERY 4 HOURS PRN
Status: CANCELLED | OUTPATIENT
Start: 2023-10-01

## 2023-10-01 RX ORDER — LANOLIN ALCOHOL/MO/W.PET/CERES
3 CREAM (GRAM) TOPICAL
Status: CANCELLED | OUTPATIENT
Start: 2023-10-01

## 2023-10-01 RX ORDER — LAMOTRIGINE 100 MG/1
100 TABLET ORAL 3 TIMES DAILY
Status: CANCELLED | OUTPATIENT
Start: 2023-10-01

## 2023-10-01 RX ORDER — LORAZEPAM 2 MG/ML
1 INJECTION INTRAMUSCULAR
Status: CANCELLED | OUTPATIENT
Start: 2023-10-01

## 2023-10-01 RX ORDER — ALBUTEROL SULFATE 90 UG/1
2 AEROSOL, METERED RESPIRATORY (INHALATION) EVERY 4 HOURS PRN
Status: CANCELLED | OUTPATIENT
Start: 2023-10-01

## 2023-10-01 RX ORDER — NICOTINE 21 MG/24HR
21 PATCH, TRANSDERMAL 24 HOURS TRANSDERMAL DAILY
Status: DISCONTINUED | OUTPATIENT
Start: 2023-10-01 | End: 2023-10-02 | Stop reason: HOSPADM

## 2023-10-01 RX ORDER — MELATONIN
1000 EVERY MORNING
Status: CANCELLED | OUTPATIENT
Start: 2023-10-02

## 2023-10-01 RX ORDER — CLOZAPINE 100 MG/1
300 TABLET ORAL
Status: CANCELLED | OUTPATIENT
Start: 2023-10-01

## 2023-10-01 RX ORDER — HALOPERIDOL 5 MG/ML
5 INJECTION INTRAMUSCULAR
Status: CANCELLED | OUTPATIENT
Start: 2023-10-01

## 2023-10-01 RX ORDER — HYDROXYZINE HYDROCHLORIDE 25 MG/1
25 TABLET, FILM COATED ORAL
Status: CANCELLED | OUTPATIENT
Start: 2023-10-01

## 2023-10-01 RX ORDER — RISPERIDONE 0.25 MG/1
0.25 TABLET ORAL
Status: CANCELLED | OUTPATIENT
Start: 2023-10-01

## 2023-10-01 RX ORDER — LORAZEPAM 1 MG/1
1 TABLET ORAL
Status: CANCELLED | OUTPATIENT
Start: 2023-10-01

## 2023-10-01 RX ORDER — LORAZEPAM 0.5 MG/1
0.5 TABLET ORAL EVERY MORNING
Status: DISCONTINUED | OUTPATIENT
Start: 2023-10-02 | End: 2023-10-02 | Stop reason: HOSPADM

## 2023-10-01 RX ORDER — HALOPERIDOL 5 MG/1
15 TABLET ORAL
Status: CANCELLED | OUTPATIENT
Start: 2023-10-01

## 2023-10-01 RX ADMIN — NICOTINE 21 MG: 21 PATCH, EXTENDED RELEASE TRANSDERMAL at 18:49

## 2023-10-01 NOTE — ED NOTES
Patient is accepted at [facility]. Patient is accepted by Dr. Roxane Kay per Chel Patricia. Transportation is arranged with [facility] via [address] [place] [address]. Transportation is scheduled for 22:30. Patient may go to the floor at 23:30.      CW informed Anna Miramontes of pt's ETA. Nurse report is to be called to [phone] prior to patient transfer.     DEANDRE Cooney, CIS ll  10/01/23 19:44

## 2023-10-01 NOTE — LETTER
[address] [facility] [address] [place] [address]  Dept: [phone]      EMTALA TRANSFER CONSENT    NAME Lisa Lewis                                         1970                              MRN [id number]    I have been informed of my rights regarding examination, treatment, and transfer   by Dr. Alberta Chino MD    Benefits: Continuity of care    Risks: Potential for delay in receiving treatment      Consent for Transfer:  I acknowledge that my medical condition has been evaluated and explained to me by the emergency department physician or other qualified medical person and/or my attending physician, who has recommended that I be transferred to the service of  Accepting Physician: Dr Jonelle Bynum at [address] [facility] [address] Name, [facility] : [facility], Older Adult Unit. The above potential benefits of such transfer, the potential risks associated with such transfer, and the probable risks of not being transferred have been explained to me, and I fully understand them. The doctor has explained that, in my case, the benefits of transfer outweigh the risks. I agree to be transferred. I authorize the performance of emergency medical procedures and treatments upon me in both transit and upon arrival at the receiving facility. Additionally, I authorize the release of any and all medical records to the receiving facility and request they be transported with me, if possible. I understand that the safest mode of transportation during a medical emergency is an ambulance and that the Hospital advocates the use of this mode of transport. Risks of traveling to the receiving facility by car, including absence of medical control, life sustaining equipment, such as oxygen, and medical personnel has been explained to me and I fully understand them. (OSEI CORRECT BOX BELOW)  [  August Miller  I consent to the stated transfer and to be transported by ambulance/helicopter.   [ ]  I consent to the stated transfer, but refuse transportation by ambulance and accept full responsibility for my transportation by car. I understand the risks of non-ambulance transfers and I exonerate the Hospital and its staff from any deterioration in my condition that results from this refusal.    X___________________________________________    DATE  10/01/23  TIME________  Signature of patient or legally responsible individual signing on patient behalf           RELATIONSHIP TO PATIENT____Self_____________________                              Provider Certification    NAME Fede Woods                                         1970                              MRN [id number]    A medical screening exam was performed on the above named patient. Based on the examination:    Condition Necessitating Transfer The encounter diagnosis was Suicidal ideations. Patient Condition: The patient has been stabilized such that within reasonable medical probability, no material deterioration of the patient condition or the condition of the unborn child(yeni) is likely to result from the transfer    Reason for Transfer: Level of Care needed not available at this facility    Transfer Requirements: [facility], Older Adult Unit   Space available and qualified personnel available for treatment as acknowledged by Henrik Montelongo, CIS ll @ [phone]  Agreed to accept transfer and to provide appropriate medical treatment as acknowledged by       Dr Thais Plunkett  Appropriate medical records of the examination and treatment of the patient are provided at the time of transfer   [address] __[place] [place]_____  Transfer will be performed by qualified personnel from [address]  and appropriate transfer equipment as required, including the use of necessary and appropriate life support measures.     Provider Certification: I have examined the patient and explained the following risks and benefits of being transferred/refusing transfer to the patient/family:  General risk, such as traffic hazards, adverse weather conditions, rough terrain or turbulence, possible failure of equipment (including vehicle or aircraft), or consequences of actions of persons outside the control of the transport personnel      Based on these reasonable risks and benefits to the patient and/or the unborn child(yeni), and based upon the information available at the time of the patient’s examination, I certify that the medical benefits reasonably to be expected from the provision of appropriate medical treatments at another medical facility outweigh the increasing risks, if any, to the individual’s medical condition, and in the case of labor to the unborn child, from effecting the transfer.     X____________________________________________ DATE 10/01/23        TIME_______      ORIGINAL - SEND TO MEDICAL RECORDS   COPY - SEND WITH PATIENT DURING TRANSFER
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
## 2023-10-01 NOTE — ED NOTES
Pt is being reviewed @ [facility], Older Adult Unit, as per Yoni SANTOS of Intake.     Augustus William, [address], [facility]  10/01/23 18:39

## 2023-10-01 NOTE — ED NOTES
CW spoke with Marshall of [address] to log pre-cert request.    DEANDRE Beavers, [facility]  10/01/23 19:48

## 2023-10-02 ENCOUNTER — HOSPITAL ENCOUNTER (INPATIENT)
Facility: HOSPITAL | Age: 53
LOS: 9 days | Discharge: HOME | DRG: 750 | End: 2023-10-11
Attending: PSYCHIATRY & NEUROLOGY | Admitting: PSYCHIATRY & NEUROLOGY
Payer: COMMERCIAL

## 2023-10-02 DIAGNOSIS — F25.0 SCHIZOAFFECTIVE DISORDER, BIPOLAR TYPE (HCC): Primary | Chronic | ICD-10-CM

## 2023-10-02 DIAGNOSIS — F41.1 GAD (GENERALIZED ANXIETY DISORDER): Chronic | ICD-10-CM

## 2023-10-02 DIAGNOSIS — R45.851 SUICIDAL IDEATIONS: ICD-10-CM

## 2023-10-02 DIAGNOSIS — T78.40XS ALLERGY, SEQUELA: ICD-10-CM

## 2023-10-02 DIAGNOSIS — Z72.0 TOBACCO ABUSE: ICD-10-CM

## 2023-10-02 DIAGNOSIS — J06.9 URI WITH COUGH AND CONGESTION: ICD-10-CM

## 2023-10-02 DIAGNOSIS — J45.21 MILD INTERMITTENT ASTHMATIC BRONCHITIS WITH ACUTE EXACERBATION: ICD-10-CM

## 2023-10-02 DIAGNOSIS — R21 VULVAR RASH: ICD-10-CM

## 2023-10-02 DIAGNOSIS — K64.9 HEMORRHOIDS, UNSPECIFIED HEMORRHOID TYPE: ICD-10-CM

## 2023-10-02 DIAGNOSIS — J45.30 MILD PERSISTENT ASTHMA WITHOUT COMPLICATION: ICD-10-CM

## 2023-10-02 DIAGNOSIS — K21.9 GASTROESOPHAGEAL REFLUX DISEASE WITHOUT ESOPHAGITIS: Chronic | ICD-10-CM

## 2023-10-02 DIAGNOSIS — E55.9 VITAMIN D DEFICIENCY: ICD-10-CM

## 2023-10-02 DIAGNOSIS — E78.5 HYPERLIPIDEMIA: ICD-10-CM

## 2023-10-02 DIAGNOSIS — M54.50 LUMBAGO: ICD-10-CM

## 2023-10-02 DIAGNOSIS — K59.00 CONSTIPATION: ICD-10-CM

## 2023-10-02 DIAGNOSIS — M62.838 MUSCLE SPASM: ICD-10-CM

## 2023-10-02 DIAGNOSIS — E53.8 VITAMIN B12 DEFICIENCY: ICD-10-CM

## 2023-10-02 LAB
25(OH)D3 SERPL-MCNC: 52.6 NG/ML (ref 30–100)
ANION GAP SERPL CALCULATED.3IONS-SCNC: 8 MMOL/L
BASOPHILS # BLD AUTO: 0.02 THOUSANDS/ÂΜL (ref 0–0.1)
BASOPHILS NFR BLD AUTO: 0 % (ref 0–1)
BUN SERPL-MCNC: 7 MG/DL (ref 5–25)
CALCIUM SERPL-MCNC: 8.8 MG/DL (ref 8.4–10.2)
CHLORIDE SERPL-SCNC: 102 MMOL/L (ref 96–108)
CHOLEST SERPL-MCNC: 166 MG/DL
CO2 SERPL-SCNC: 27 MMOL/L (ref 21–32)
CREAT SERPL-MCNC: 0.54 MG/DL (ref 0.6–1.3)
EOSINOPHIL # BLD AUTO: 0.32 THOUSAND/ÂΜL (ref 0–0.61)
EOSINOPHIL NFR BLD AUTO: 5 % (ref 0–6)
ERYTHROCYTE [DISTWIDTH] IN BLOOD BY AUTOMATED COUNT: 15 % (ref 11.6–15.1)
FOLATE SERPL-MCNC: 8.7 NG/ML
GFR SERPL CREATININE-BSD FRML MDRD: 108 ML/MIN/1.73SQ M
GLUCOSE P FAST SERPL-MCNC: 101 MG/DL (ref 65–99)
GLUCOSE SERPL-MCNC: 101 MG/DL (ref 65–140)
HCT VFR BLD AUTO: 40.3 % (ref 34.8–46.1)
HDLC SERPL-MCNC: 53 MG/DL
HGB BLD-MCNC: 12.7 G/DL (ref 11.5–15.4)
IMM GRANULOCYTES # BLD AUTO: 0.04 THOUSAND/UL (ref 0–0.2)
IMM GRANULOCYTES NFR BLD AUTO: 1 % (ref 0–2)
LDLC SERPL CALC-MCNC: 86 MG/DL (ref 0–100)
LYMPHOCYTES # BLD AUTO: 1.53 THOUSANDS/ÂΜL (ref 0.6–4.47)
LYMPHOCYTES NFR BLD AUTO: 22 % (ref 14–44)
MCH RBC QN AUTO: 28.3 PG (ref 26.8–34.3)
MCHC RBC AUTO-ENTMCNC: 31.5 G/DL (ref 31.4–37.4)
MCV RBC AUTO: 90 FL (ref 82–98)
MONOCYTES # BLD AUTO: 0.4 THOUSAND/ÂΜL (ref 0.17–1.22)
MONOCYTES NFR BLD AUTO: 6 % (ref 4–12)
NEUTROPHILS # BLD AUTO: 4.81 THOUSANDS/ÂΜL (ref 1.85–7.62)
NEUTS SEG NFR BLD AUTO: 66 % (ref 43–75)
NONHDLC SERPL-MCNC: 113 MG/DL
NRBC BLD AUTO-RTO: 0 /100 WBCS
PLATELET # BLD AUTO: 342 THOUSANDS/UL (ref 149–390)
PMV BLD AUTO: 9 FL (ref 8.9–12.7)
POTASSIUM SERPL-SCNC: 4.1 MMOL/L (ref 3.5–5.3)
RBC # BLD AUTO: 4.49 MILLION/UL (ref 3.81–5.12)
SODIUM SERPL-SCNC: 137 MMOL/L (ref 135–147)
TRIGL SERPL-MCNC: 137 MG/DL
VIT B12 SERPL-MCNC: 245 PG/ML (ref 180–914)
WBC # BLD AUTO: 7.12 THOUSAND/UL (ref 4.31–10.16)

## 2023-10-02 PROCEDURE — 80061 LIPID PANEL: CPT | Performed by: PSYCHIATRY & NEUROLOGY

## 2023-10-02 PROCEDURE — 85025 COMPLETE CBC W/AUTO DIFF WBC: CPT | Performed by: PSYCHIATRY & NEUROLOGY

## 2023-10-02 PROCEDURE — 82306 VITAMIN D 25 HYDROXY: CPT | Performed by: PSYCHIATRY & NEUROLOGY

## 2023-10-02 PROCEDURE — GZHZZZZ GROUP PSYCHOTHERAPY: ICD-10-PCS | Performed by: PSYCHIATRY & NEUROLOGY

## 2023-10-02 PROCEDURE — 82607 VITAMIN B-12: CPT | Performed by: PSYCHIATRY & NEUROLOGY

## 2023-10-02 PROCEDURE — 99223 1ST HOSP IP/OBS HIGH 75: CPT | Performed by: PSYCHIATRY & NEUROLOGY

## 2023-10-02 PROCEDURE — 80048 BASIC METABOLIC PNL TOTAL CA: CPT | Performed by: PSYCHIATRY & NEUROLOGY

## 2023-10-02 PROCEDURE — 82746 ASSAY OF FOLIC ACID SERUM: CPT | Performed by: PSYCHIATRY & NEUROLOGY

## 2023-10-02 PROCEDURE — GZ59ZZZ INDIVIDUAL PSYCHOTHERAPY, PSYCHOPHYSIOLOGICAL: ICD-10-PCS | Performed by: PSYCHIATRY & NEUROLOGY

## 2023-10-02 RX ORDER — TRAZODONE HYDROCHLORIDE 50 MG/1
50 TABLET ORAL
Status: DISCONTINUED | OUTPATIENT
Start: 2023-10-02 | End: 2023-10-11 | Stop reason: HOSPADM

## 2023-10-02 RX ORDER — FENOFIBRATE 145 MG/1
145 TABLET, COATED ORAL DAILY
Status: DISCONTINUED | OUTPATIENT
Start: 2023-10-02 | End: 2023-10-11 | Stop reason: HOSPADM

## 2023-10-02 RX ORDER — RISPERIDONE 0.5 MG/1
0.5 TABLET ORAL
Status: DISCONTINUED | OUTPATIENT
Start: 2023-10-02 | End: 2023-10-09

## 2023-10-02 RX ORDER — LORAZEPAM 0.5 MG/1
0.5 TABLET ORAL EVERY 8 HOURS PRN
Status: DISCONTINUED | OUTPATIENT
Start: 2023-10-02 | End: 2023-10-09

## 2023-10-02 RX ORDER — CLOZAPINE 100 MG/1
200 TABLET ORAL
Status: DISCONTINUED | OUTPATIENT
Start: 2023-10-02 | End: 2023-10-03

## 2023-10-02 RX ORDER — GUAIFENESIN/DEXTROMETHORPHAN 100-10MG/5
10 SYRUP ORAL EVERY 4 HOURS PRN
Status: DISCONTINUED | OUTPATIENT
Start: 2023-10-02 | End: 2023-10-04

## 2023-10-02 RX ORDER — ACETAMINOPHEN 325 MG/1
650 TABLET ORAL EVERY 6 HOURS PRN
Status: DISCONTINUED | OUTPATIENT
Start: 2023-10-02 | End: 2023-10-11 | Stop reason: HOSPADM

## 2023-10-02 RX ORDER — HALOPERIDOL 5 MG/ML
5 INJECTION INTRAMUSCULAR
Status: DISCONTINUED | OUTPATIENT
Start: 2023-10-02 | End: 2023-10-11 | Stop reason: HOSPADM

## 2023-10-02 RX ORDER — HYDROXYZINE HYDROCHLORIDE 25 MG/1
25 TABLET, FILM COATED ORAL
Status: DISCONTINUED | OUTPATIENT
Start: 2023-10-02 | End: 2023-10-09

## 2023-10-02 RX ORDER — ALBUTEROL SULFATE 90 UG/1
2 AEROSOL, METERED RESPIRATORY (INHALATION) EVERY 4 HOURS PRN
Status: DISCONTINUED | OUTPATIENT
Start: 2023-10-02 | End: 2023-10-11 | Stop reason: HOSPADM

## 2023-10-02 RX ORDER — FLUTICASONE PROPIONATE 220 UG/1
2 AEROSOL, METERED RESPIRATORY (INHALATION) EVERY 12 HOURS SCHEDULED
Status: DISCONTINUED | OUTPATIENT
Start: 2023-10-02 | End: 2023-10-11 | Stop reason: HOSPADM

## 2023-10-02 RX ORDER — LORATADINE 10 MG/1
10 TABLET ORAL DAILY
Status: DISCONTINUED | OUTPATIENT
Start: 2023-10-02 | End: 2023-10-11 | Stop reason: HOSPADM

## 2023-10-02 RX ORDER — CLOZAPINE 100 MG/1
100 TABLET ORAL DAILY
Status: DISCONTINUED | OUTPATIENT
Start: 2023-10-02 | End: 2023-10-03

## 2023-10-02 RX ORDER — LORAZEPAM 0.5 MG/1
0.5 TABLET ORAL DAILY
Status: DISCONTINUED | OUTPATIENT
Start: 2023-10-02 | End: 2023-10-09

## 2023-10-02 RX ORDER — LAMOTRIGINE 25 MG/1
50 TABLET ORAL 2 TIMES DAILY
Status: DISCONTINUED | OUTPATIENT
Start: 2023-10-02 | End: 2023-10-03

## 2023-10-02 RX ORDER — RISPERIDONE 1 MG/1
1 TABLET ORAL
Status: DISCONTINUED | OUTPATIENT
Start: 2023-10-02 | End: 2023-10-05

## 2023-10-02 RX ORDER — LANOLIN ALCOHOL/MO/W.PET/CERES
3 CREAM (GRAM) TOPICAL
Status: DISCONTINUED | OUTPATIENT
Start: 2023-10-02 | End: 2023-10-03

## 2023-10-02 RX ORDER — RISPERIDONE 0.25 MG/1
0.25 TABLET ORAL
Status: DISCONTINUED | OUTPATIENT
Start: 2023-10-02 | End: 2023-10-09

## 2023-10-02 RX ORDER — MELATONIN
1000 EVERY MORNING
Status: DISCONTINUED | OUTPATIENT
Start: 2023-10-02 | End: 2023-10-11 | Stop reason: HOSPADM

## 2023-10-02 RX ORDER — LORAZEPAM 2 MG/ML
1 INJECTION INTRAMUSCULAR
Status: DISCONTINUED | OUTPATIENT
Start: 2023-10-02 | End: 2023-10-11 | Stop reason: HOSPADM

## 2023-10-02 RX ORDER — MAGNESIUM HYDROXIDE/ALUMINUM HYDROXICE/SIMETHICONE 120; 1200; 1200 MG/30ML; MG/30ML; MG/30ML
30 SUSPENSION ORAL EVERY 4 HOURS PRN
Status: DISCONTINUED | OUTPATIENT
Start: 2023-10-02 | End: 2023-10-06

## 2023-10-02 RX ORDER — VENLAFAXINE HYDROCHLORIDE 150 MG/1
150 CAPSULE, EXTENDED RELEASE ORAL DAILY
Status: DISCONTINUED | OUTPATIENT
Start: 2023-10-03 | End: 2023-10-11 | Stop reason: HOSPADM

## 2023-10-02 RX ORDER — LORAZEPAM 0.5 MG/1
0.5 TABLET ORAL
Status: DISCONTINUED | OUTPATIENT
Start: 2023-10-02 | End: 2023-10-03

## 2023-10-02 RX ORDER — NICOTINE 21 MG/24HR
1 PATCH, TRANSDERMAL 24 HOURS TRANSDERMAL DAILY
Status: DISCONTINUED | OUTPATIENT
Start: 2023-10-02 | End: 2023-10-11 | Stop reason: HOSPADM

## 2023-10-02 RX ADMIN — LAMOTRIGINE 50 MG: 25 TABLET ORAL at 12:56

## 2023-10-02 RX ADMIN — FLUTICASONE PROPIONATE 2 PUFF: 220 AEROSOL, METERED RESPIRATORY (INHALATION) at 21:22

## 2023-10-02 RX ADMIN — CLOZAPINE 200 MG: 100 TABLET ORAL at 21:22

## 2023-10-02 RX ADMIN — Medication 1000 UNITS: at 08:53

## 2023-10-02 RX ADMIN — LORAZEPAM 0.5 MG: 0.5 TABLET ORAL at 21:22

## 2023-10-02 RX ADMIN — NICOTINE 1 PATCH: 21 PATCH, EXTENDED RELEASE TRANSDERMAL at 11:06

## 2023-10-02 RX ADMIN — CLOZAPINE 100 MG: 100 TABLET ORAL at 11:06

## 2023-10-02 RX ADMIN — RISPERIDONE 0.25 MG: 0.25 TABLET, FILM COATED ORAL at 14:28

## 2023-10-02 RX ADMIN — LORATADINE 10 MG: 10 TABLET ORAL at 11:06

## 2023-10-02 RX ADMIN — LORAZEPAM 0.5 MG: 0.5 TABLET ORAL at 11:06

## 2023-10-02 RX ADMIN — GUAIFENESIN AND DEXTROMETHORPHAN 10 ML: 100; 10 SYRUP ORAL at 20:09

## 2023-10-02 RX ADMIN — GUAIFENESIN AND DEXTROMETHORPHAN 10 ML: 100; 10 SYRUP ORAL at 15:48

## 2023-10-02 RX ADMIN — FENOFIBRATE 145 MG: 145 TABLET, FILM COATED ORAL at 11:06

## 2023-10-02 RX ADMIN — HYDROXYZINE HYDROCHLORIDE 25 MG: 25 TABLET ORAL at 01:42

## 2023-10-02 RX ADMIN — LAMOTRIGINE 50 MG: 25 TABLET ORAL at 17:24

## 2023-10-02 NOTE — PROGRESS NOTES
10/02/23 1030   Team Meeting   Meeting Type Tx Team Meeting   Team Members Present   Team Members Present Physician;Nurse;   Physician Team Member Dr. Lucretia Peace MD   Nursing Team Member Loren Gallagher, RN   Care Management Team Member Rema Chaparro MS, [facility], [facility]   Patient/Family Present   Patient Present Yes   Patient's Family Present No     PT met with [facility] team, reviewed TX plan and goals, all in agreement and signed.

## 2023-10-02 NOTE — NURSING NOTE
Pt was medicated for mild agitation with PRN risperidone 0.25 mg. Pt rejoined the milieu and began to socialize with select peers. Pt states the medication was effective.

## 2023-10-02 NOTE — NURSING NOTE
Patient has been observed sleeping on the majority of Q7 minute rounds. Patient shows no s/s of distress. Non-labored breathing. Monitoring continues.

## 2023-10-02 NOTE — PLAN OF CARE
Problem: Ineffective Coping  Goal: Cooperates with admission process  Description: Interventions:   - Complete admission process  Outcome: Completed     Problem: Risk for Self Injury/Neglect  Goal: Verbalize thoughts and feelings  Description: Interventions:  - Assess and re-assess patient's lethality and potential for self-injury  - Engage patient in 1:1 interactions, daily, for a minimum of 15 minutes  - Encourage patient to express feelings, fears, frustrations, hopes  - Establish rapport/trust with patient   Outcome: Progressing  Goal: Refrain from harming self  Description: Interventions:  - Monitor patient closely, per order  - Develop a trusting relationship  - Supervise medication ingestion, monitor effects and side effects   Outcome: Progressing

## 2023-10-02 NOTE — SOCIAL WORK
CM placed call to PT sister Maxi Sandoval for family check in. Updated her on PT status and plan of care. PT sister in agreement. Provided contact information. Call ended mutually. CM placed call to [facility] [phone], left message requesting return call. CM placed call to [facility] team , left message requesting return call.

## 2023-10-02 NOTE — SOCIAL WORK
CM received medication list from act team via fax, provided copy to provider and sent copy to media.

## 2023-10-02 NOTE — TREATMENT PLAN
TREATMENT PLAN REVIEW - [facility] ZOILA Lamb 47 y.o. 1970 female MRN: [id number]    [address] [address] Room / Bed: Juan Nguyen/OABHU [id number] Encounter: [id number]          Admit Date/Time:  10/2/2023 12:23 AM    Treatment Team: Attending Provider: Navarro Polo MD; Patient Care Assistant: Zuri Chaney; Certified Nursing Assistant: Anthony Gleason; Care Manager: Harjinder Jarquin RN; Certified Nursing Assistant: Sadie Stubbs; : Tre Herbert MS; Licensed Practical Nurse: Mitzi Phipps LPN; Patient Care Assistant: Ilir Morales; Patient Care Assistant: Alex Jacobson    Diagnosis: Principal Problem:    Schizoaffective disorder, bipolar type ([address])  Active Problems:    LYNN (generalized anxiety disorder)    Degenerative disc disease, lumbar    Hyperlipidemia    Post-traumatic stress disorder, chronic    Gastroesophageal reflux disease    Hoarseness    Other headache syndrome    Dependence on nicotine from cigarettes    H/O alcohol dependence ([address])      Patient Strengths/Assets: cooperative, negotiates basic needs, patient is on a voluntary commitment    Patient Barriers/Limitations: limited family ties, poor insight, poor reasoning ability    Short Term Goals: decrease in depressive symptoms, decrease in anxiety symptoms, decrease in psychotic symptoms, decrease in suicidal thoughts, ability to stay safe on the unit, ability to stay free of restraints, improvement in reasoning ability, improvement in self care, sleep improvement, improvement in appetite, mood stabilization, increase in group attendance, increase in socialization with peers on the unit, acceptance of need for psychiatric treatment, acceptance of psychiatric medications    Long Term Goals: improvement in depression, improvement in anxiety, resolution of manic symptoms, stabilization of mood, free of suicidal thoughts, improvement in reasoning ability, improved insight, able to express basic needs, acceptance of need for psychiatric medications, acceptance of need for psychiatric treatment, adequate self care, adequate sleep, adequate appetite, adequate oral intake, appropriate interaction with peers, stable living arrangements upon discharge    Progress Towards Goals: starting psychiatric medications as prescribed    Recommended Treatment: medication management, patient medication education, group therapy, milieu therapy, continued Behavioral Health psychiatric evaluation/assessment process, medical follow up with medical team    Treatment Frequency: daily medication monitoring, group and milieu therapy daily, monitoring through interdisciplinary rounds, monitoring through weekly patient care conferences    Expected Discharge Date: 10 midnights     Discharge Plan: will be determined    Treatment Plan Created/Updated By: Julius Anguiano MD

## 2023-10-02 NOTE — NURSING NOTE
Patient admitted from [facility] ED on a valid 201. Pleasant and cooperative during assessment. Patient endorsed passive SI with no plan. Feels she is being hypnotized and then  forced to take medications. Currently denies SI, HI, A/V/H. Mild anxiety and depression. Atarax 25 mg given with good results. Will continue to monitor and access.

## 2023-10-02 NOTE — SOCIAL WORK
CM placed follow up call to [facility] act team to request for med list to be fax as it has not been received. [phone]. Left message with alt fax number requesting return call.

## 2023-10-02 NOTE — NURSING NOTE
Pt has been withdrawn to her room. Pt is cooperative with an irritable edge. Pt stated she is agitated. Pt was medicated for mild agitation with PRN PO risperidone 0.25mg at 1428. Will assess for effectiveness.

## 2023-10-02 NOTE — NURSING NOTE
Performed skin assessment w/ LPN Annalisa, skin WNL w/ the exception of skin flaking under abdominal folds and scars under bilateral abdominal folds. She was unable to state what caused the scarring.
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
## 2023-10-02 NOTE — NURSING NOTE
Pt is visible in the milieu. Pt is cooperative. Pt is medication compliant. Pt endorses moderate anxiety and depression. Pt endorses AH of " cruel voices." Pt states they are very bothersome. Pt denies SI/HI. Pt was medicated with PRN risperidone 0.25 mg for mild agitation with positive effect. Pt was medicated for a nonproductive cough with PRN robitussin with positive effect. Q 7 minute safety checks were maintained.

## 2023-10-02 NOTE — SOCIAL WORK
[facility] . Spoke with [facility]. CM updated her on PT status and plan of care. Michelle indicated that PT struggels daily with the hallucinations, responds to internal stimulation by having conversations with voices, tends to fluctuate with si at baseline. PT is compliant with medications. Mariah Drippler will send medication list via fax. Group home is double staff 6 individuals in the home, 24'7. Pharmacy is formerly [facility]. Provided contact inforamtion. Call ended mutually. OLIVE spoke with Cherrie Zurita with [facility] ACT team . CM notified of PT admission. Updated on PT status and plan of care. Cherrie Zurita in agreement. PT had recently loss  and nurse. Dr. Laura Barnett MD psychiatrist  Broderick Mata'-D&A/therapist  Camille Hernandez lead  Cherrie Zurita will send medication list via fax. CM provided contact information. Call ended mutually. CM placed call to  primary care [phone], left message requesting return call.

## 2023-10-02 NOTE — ED NOTES
Insurance Authorization for admission:   Phone call placed to [facility]. Phone number: [phone]. Spoke to Salvadortesha Cormier. 6 days approved. Level of care: 201. Review on 10/6/23. Authorization # [id number]. Eligibility Verification System checked - ([phone]). Online system / automated system indicates: [place] eligible. Insurance Authorization for Transportation:      Not needed for CTS.     Winnie Sam, CIS ll  10/01/23 21:01
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
## 2023-10-02 NOTE — PROGRESS NOTES
10/02/23    Team Meeting   Meeting Type Daily Rounds   Team Members Present   Team Members Present Physician;Nurse;;Occupational Therapist   Physician Team Member Dr. Amber Gallego MD; Lian Cagle, [address] [place] [address]   Nursing Team Member Meryle Ebbing, RN   Care Management Team Member Brendan Avendano [place], [facility] [place] [facility]   OT Team Member Kwame Mena   Patient/Family Present   Patient Present No   Patient's Family Present No   New admission, 12. Suicidal at admission, paranoid, reviewed prior psych and medical hx. [facility] act team. Prns. Needs psych meds ordered. Endorsed anxiety and depression, ah of voices.

## 2023-10-03 LAB — GLUCOSE SERPL-MCNC: 111 MG/DL (ref 65–140)

## 2023-10-03 PROCEDURE — 82948 REAGENT STRIP/BLOOD GLUCOSE: CPT

## 2023-10-03 PROCEDURE — 99233 SBSQ HOSP IP/OBS HIGH 50: CPT | Performed by: PSYCHIATRY & NEUROLOGY

## 2023-10-03 RX ORDER — CYANOCOBALAMIN 1000 UG/ML
1000 INJECTION, SOLUTION INTRAMUSCULAR; SUBCUTANEOUS
Status: DISCONTINUED | OUTPATIENT
Start: 2023-10-03 | End: 2023-10-11 | Stop reason: HOSPADM

## 2023-10-03 RX ORDER — LAMOTRIGINE 100 MG/1
100 TABLET ORAL 2 TIMES DAILY
Status: DISCONTINUED | OUTPATIENT
Start: 2023-10-03 | End: 2023-10-08

## 2023-10-03 RX ORDER — NYSTATIN AND TRIAMCINOLONE ACETONIDE 100000; 1 [USP'U]/G; MG/G
OINTMENT TOPICAL 2 TIMES DAILY
Status: DISCONTINUED | OUTPATIENT
Start: 2023-10-03 | End: 2023-10-11 | Stop reason: HOSPADM

## 2023-10-03 RX ORDER — LORAZEPAM 1 MG/1
1 TABLET ORAL
Status: DISCONTINUED | OUTPATIENT
Start: 2023-10-03 | End: 2023-10-09

## 2023-10-03 RX ORDER — HYDROCORTISONE 25 MG/G
CREAM TOPICAL 4 TIMES DAILY PRN
Status: DISCONTINUED | OUTPATIENT
Start: 2023-10-03 | End: 2023-10-11 | Stop reason: HOSPADM

## 2023-10-03 RX ADMIN — VENLAFAXINE HYDROCHLORIDE 150 MG: 150 CAPSULE, EXTENDED RELEASE ORAL at 08:47

## 2023-10-03 RX ADMIN — LAMOTRIGINE 100 MG: 100 TABLET ORAL at 17:00

## 2023-10-03 RX ADMIN — CLOZAPINE 250 MG: 100 TABLET ORAL at 21:11

## 2023-10-03 RX ADMIN — LORATADINE 10 MG: 10 TABLET ORAL at 08:47

## 2023-10-03 RX ADMIN — GUAIFENESIN AND DEXTROMETHORPHAN 10 ML: 100; 10 SYRUP ORAL at 14:40

## 2023-10-03 RX ADMIN — LAMOTRIGINE 50 MG: 25 TABLET ORAL at 08:47

## 2023-10-03 RX ADMIN — GUAIFENESIN AND DEXTROMETHORPHAN 10 ML: 100; 10 SYRUP ORAL at 21:11

## 2023-10-03 RX ADMIN — NICOTINE 1 PATCH: 21 PATCH, EXTENDED RELEASE TRANSDERMAL at 08:47

## 2023-10-03 RX ADMIN — NYSTATIN AND TRIAMCINOLONE ACETONIDE 1 APPLICATION: 100000; 1 OINTMENT TOPICAL at 17:05

## 2023-10-03 RX ADMIN — LORAZEPAM 1 MG: 1 TABLET ORAL at 21:11

## 2023-10-03 RX ADMIN — Medication 1000 UNITS: at 08:47

## 2023-10-03 RX ADMIN — FLUTICASONE PROPIONATE 2 PUFF: 220 AEROSOL, METERED RESPIRATORY (INHALATION) at 08:48

## 2023-10-03 RX ADMIN — LORAZEPAM 0.5 MG: 0.5 TABLET ORAL at 08:46

## 2023-10-03 RX ADMIN — CLOZAPINE 100 MG: 100 TABLET ORAL at 08:46

## 2023-10-03 RX ADMIN — LORAZEPAM 0.5 MG: 0.5 TABLET ORAL at 17:44

## 2023-10-03 RX ADMIN — NYSTATIN AND TRIAMCINOLONE ACETONIDE 1 APPLICATION: 100000; 1 OINTMENT TOPICAL at 17:06

## 2023-10-03 RX ADMIN — FENOFIBRATE 145 MG: 145 TABLET, FILM COATED ORAL at 08:46

## 2023-10-03 RX ADMIN — CYANOCOBALAMIN 1000 MCG: 1000 INJECTION, SOLUTION INTRAMUSCULAR; SUBCUTANEOUS at 14:41

## 2023-10-03 NOTE — PROGRESS NOTES
10/03/23   Team Meeting   Meeting Type Daily Rounds   Team Members Present   Team Members Present Physician;Nurse;   Physician Team Member Dr. Asad Guadalupe MD; JARED Juarez   Nursing Team Member Bam Gaxiola RN   Care Management Team Member MS Ericka, [facility] [place] [facility]   OT Team Member Kwame Camara   Patient/Family Present   Patient Present No   Patient's Family Present No   Will return to [facility] and continue with act services when stable. Slept, ah, agitation at times, declined select medications, prn, moderate depression, flat. Denies si.

## 2023-10-03 NOTE — PLAN OF CARE
Problem: Alteration in Thoughts and Perception  Goal: Treatment Goal: Gain control of psychotic behaviors/thinking, reduce/eliminate presenting symptoms and demonstrate improved reality functioning upon discharge  Outcome: Progressing  Goal: Refrain from acting on delusional thinking/internal stimuli  Description: Interventions:  - Monitor patient closely, per order   - Utilize least restrictive measures   - Set reasonable limits, give positive feedback for acceptable   - Administer medications as ordered and monitor of potential side effects  Outcome: Progressing  Goal: Agree to be compliant with medication regime, as prescribed and report medication side effects  Description: Interventions:  - Offer appropriate PRN medication and supervise ingestion; conduct AIMS, as needed   Outcome: Progressing  Goal: Attend and participate in unit activities, including therapeutic, recreational, and educational groups  Description: Interventions:  -Encourage Visitation and family involvement in care  Outcome: Progressing     Problem: Ineffective Coping  Goal: Demonstrates healthy coping skills  Outcome: Progressing  Goal: Participates in unit activities  Description: Interventions:  - Provide therapeutic environment   - Provide required programming   - Redirect inappropriate behaviors   Outcome: Progressing  Goal: Patient/Family participate in treatment and DC plans  Description: Interventions:  - Provide therapeutic environment  Outcome: Progressing  Goal: Patient/Family verbalizes awareness of resources  Outcome: Progressing  Goal: Understands least restrictive measures  Description: Interventions:  - Utilize least restrictive behavior  Outcome: Progressing  Goal: Free from restraint events  Description: - Utilize least restrictive measures   - Provide behavioral interventions   - Redirect inappropriate behaviors   Outcome: Progressing     Problem: Risk for Self Injury/Neglect  Goal: Treatment Goal: Remain safe during length of stay, learn and adopt new coping skills, and be free of self-injurious ideation, impulses and acts at the time of discharge  Outcome: Progressing  Goal: Verbalize thoughts and feelings  Description: Interventions:  - Assess and re-assess patient's lethality and potential for self-injury  - Engage patient in 1:1 interactions, daily, for a minimum of 15 minutes  - Encourage patient to express feelings, fears, frustrations, hopes  - Establish rapport/trust with patient   Outcome: Progressing  Goal: Refrain from harming self  Description: Interventions:  - Monitor patient closely, per order  - Develop a trusting relationship  - Supervise medication ingestion, monitor effects and side effects   Outcome: Progressing  Goal: Attend and participate in unit activities, including therapeutic, recreational, and educational groups  Description: Interventions:  - Provide therapeutic and educational activities daily, encourage attendance and participation, and document same in the medical record  - Obtain collateral information, encourage visitation and family involvement in care   Outcome: Progressing     Problem: Depression  Goal: Treatment Goal: Demonstrate behavioral control of depressive symptoms, verbalize feelings of improved mood/affect, and adopt new coping skills prior to discharge  Outcome: Progressing  Goal: Refrain from isolation  Description: Interventions:  - Develop a trusting relationship   - Encourage socialization   Outcome: Progressing  Goal: Refrain from self-neglect  Outcome: Progressing  Goal: Attend and participate in unit activities, including therapeutic, recreational, and educational groups  Description: Interventions:  - Provide therapeutic and educational activities daily, encourage attendance and participation, and document same in the medical record   Outcome: Progressing  Goal: Complete daily ADLs, including personal hygiene independently, as able  Description: Interventions:  - Observe, teach, and assist patient with ADLS  -  Monitor and promote a balance of rest/activity, with adequate nutrition and elimination   Outcome: Progressing     Problem: Anxiety  Goal: Anxiety is at manageable level  Description: Interventions:  - Assess and monitor patient's anxiety level. - Monitor for signs and symptoms (heart palpitations, chest pain, shortness of breath, headaches, nausea, feeling jumpy, restlessness, irritable, apprehensive). - Collaborate with interdisciplinary team and initiate plan and interventions as ordered.   - Wayne patient to unit/surroundings  - Explain treatment plan  - Encourage participation in care  - Encourage verbalization of concerns/fears  - Identify coping mechanisms  - Assist in developing anxiety-reducing skills  - Administer/offer alternative therapies  - Limit or eliminate stimulants  Outcome: Progressing     Problem: DISCHARGE PLANNING - CARE MANAGEMENT  Goal: Discharge to post-acute care or home with appropriate resources  Description: INTERVENTIONS:  - Conduct assessment to determine patient/family and health care team treatment goals, and need for post-acute services based on payer coverage, community resources, and patient preferences, and barriers to discharge  - Address psychosocial, clinical, and financial barriers to discharge as identified in assessment in conjunction with the patient/family and health care team  - Arrange appropriate level of post-acute services according to patient’s   needs and preference and payer coverage in collaboration with the physician and health care team  - Communicate with and update the patient/family, physician, and health care team regarding progress on the discharge plan  - Arrange appropriate transportation to post-acute venues  Outcome: Progressing

## 2023-10-03 NOTE — TREATMENT TEAM
10/03/23 1740   [place] Anxiety Scale   Anxious Mood 3   Tension 3   Fears 3   Insomnia 3   Intellectual 2   Depressed Mood 3   Somatic Complaints: Muscular 2   Somatic Complaints: Sensory 0   Cardiovascular Symptoms 0   Respiratory Symptoms 0   Gastrointestinal Symptoms 0   Genitourinary Symptoms 0   Autonomic Symptoms 2   Behavior at Interview 3   [place] Anxiety Score 24     Patient received PRN ativan 0.5 mg for moderate anxiety. Will continue to monitor effectiveness.

## 2023-10-03 NOTE — PROGRESS NOTES
Progress Note - Shane Mireles 47 y.o. female MRN: [id number]    Unit/Bed#[facility] 206-02 Encounter: [id number]        Subjective:   Patient seen and examined at bedside after reviewing the chart and discussing the case with the caring staff. Patient examined at bedside. Patient reports that she has a rash on her labia for which she was put on an antifungal cream.  Patient is also requesting a cream for hemorrhoids. On review of patient's labs it was found that patient's vitamin D level was normal 52.6 but B12 level is low 245. Physical Exam   Vitals: Blood pressure 127/67, pulse 70, temperature 98.4 °F (36.9 °C), temperature source Temporal, resp. rate 16, height 5' (1.524 m), weight 66 kg (145 lb 9.6 oz), last menstrual period 01/01/2020, SpO2 93 %, not currently breastfeeding. ,Body mass index is 28.44 kg/m². Constitutional: He appears well-developed. HEENT: PERR, EOMI, MMM  Cardiovascular: Normal rate and regular rhythm. Pulmonary/Chest: Effort normal and breath sounds normal.   Abdomen: Soft, + BS, NT    Assessment/Plan:  Shane Mireles is a(n) 47y.o. year old female with MDD     1. Asthma/COPD/cough. Patient will be continued on Flovent twice daily along with albuterol inhaler as needed. Patient may also get Robitussin as needed. 2. Tobacco abuse.  Patient is on Nicotine transdermal patch 21 mg/24 hr along with nicotine gum as needed. 3.  Hypertriglyceridemia.  Continue Fenofibrate 145 mg daily.   4. Allergic rhinitis.  Patient is on Claritin 10 mg daily. 5. Vitamin-D deficiency.  Continue vitamin D3 1000 units daily.   I will get vitamin D levels. 6.  Insomnia. Patient is on melatonin 3 mg at bedtime. 7. DJD/osteoarthritis. Patient may get Tylenol as needed. 8.  Fungal rash on the labia. Patient may get Mycolog-II ointment twice daily over the affected area. 9.  Hemorrhoids. Patient may get Anusol cream over the affected area as needed. 10.  New vitamin B12 deficiency.   I will put the patient on monthly B12 injections.

## 2023-10-03 NOTE — NURSING NOTE
Patient was observed to be visible in the community this evening. She has been calm and cooperative during staff interactions. She continues to endorse moderate anxiety and depression. Denies SI, HI and hallucinations at time of assessment, however patient does endorse intermittent AH of voices saying mean things. Brian Coffey took HS medications, however she declined scheduled Melatonin stating she did not like how it made her feel (without further elaboration). Positive for snack and fluids tonight. She also received PRN Robitussin for ongoing c/o cough; obtained mild relief. No further complaints voiced. Continuous safety rounding in progress.

## 2023-10-03 NOTE — PROGRESS NOTES
Progress Note - Behavioral Health     Eloise Gonzalez 47 y.o. female MRN: [id number]   Unit/Bed#: [facility] 206-02 Encounter: [id number]    Behavior over the last 24 hours: minimal improvement. Shelli Giles was seen for continuing care. Patient reports feeling anxious, restless but has adjusted well to the unit. Patient verbalized understanding that her current medication regimen poses risk of increase cardiac side effect and agreed to stay on Clozaril without Haldol at this time. She request to take 300 mg all at bedtime and does not want to take Melatonin. She is has been trying to participate in group this morning. Pt has been compliant with medication and denies any current side effects. Continued experiencing severe depression with passive wish to die but she is able to contract for safety. Staff report some particiaption in groups and on the unit.     Sleep: slept better  Appetite: fair  Medication side effects: denies  ROS: all other systems are negative for acute changes    Mental Status Evaluation:    Appearance:  age appropriate   Behavior:  guarded   Speech:  normal rate and volume   Mood:  depressed, anxious   Affect:  constricted   Thought Process:  goal directed   Associations: intact associations   Thought Content:  mild paranoia   Perceptual Disturbances: denies auditory hallucinations when asked   Risk Potential: Suicidal ideation - Yes, without plan  Homicidal ideation - None at present   Sensorium:  oriented to person, place and time/date   Memory:  recent and remote memory grossly intact   Consciousness:  alert and awake   Attention: attention span and concentration are age appropriate   Insight:  limited   Judgment: fair   Gait/Station: normal gait/station   Motor Activity: no abnormal movements     Vital signs in last 24 hours:    Temp:  [97.3 °F (36.3 °C)-98.4 °F (36.9 °C)] 98.4 °F (36.9 °C)  HR:  [] 70  Resp:  [16-19] 16  BP: (105-127)/(67-75) 127/67    Laboratory results:   I have personally reviewed all pertinent laboratory/tests results. Most Recent Labs:   Lab Results   Component Value Date    WBC 7.12 10/02/2023    RBC 4.49 10/02/2023    HGB 12.7 10/02/2023    HCT 40.3 10/02/2023     10/02/2023    RDW 15.0 10/02/2023    NEUTROABS 4.81 10/02/2023    SODIUM 137 10/02/2023    K 4.1 10/02/2023     10/02/2023    CO2 27 10/02/2023    BUN 7 10/02/2023    CREATININE 0.54 (L) 10/02/2023    GLUC 101 10/02/2023    GLUF 101 (H) 10/02/2023    CALCIUM 8.8 10/02/2023    AST 14 10/01/2023    ALT 14 10/01/2023    ALKPHOS 80 10/01/2023    TP 7.1 10/01/2023    ALB 4.1 10/01/2023    TBILI 0.23 10/01/2023    CHOLESTEROL 166 10/02/2023    HDL 53 10/02/2023    TRIG 137 10/02/2023    LDLCALC 86 10/02/2023    NONHDLC 113 10/02/2023    AMMONIA 28 10/27/2017    TQX8TTFKGANN 2.345 10/01/2023    FREET4 0.89 03/24/2016    PREGUR Negative 11/07/2022    PREGSERUM Negative 10/21/2019    HCG <2.00 04/10/2014    RPR Non-Reactive 12/07/2022    HGBA1C 5.6 12/02/2022     12/02/2022       Assessment/Plan   Principal Problem:    Schizoaffective disorder, bipolar type ([address])  Active Problems:    LYNN (generalized anxiety disorder)    Degenerative disc disease, lumbar    Hyperlipidemia    Post-traumatic stress disorder, chronic    Gastroesophageal reflux disease    Hoarseness    Other headache syndrome    Dependence on nicotine from cigarettes    H/O alcohol dependence ([address])    Recommended Treatment:     Planned medication and treatment changes:     All current active medications have been reviewed  Encourage group therapy, milieu therapy and occupational therapy  Behavioral Health checks every 7 minutes   Clozaril 250 mg po hs with plan to increase to 300 mg hs tomorrow   Increase Lamictal 100 mg bid     Current Facility-Administered Medications   Medication Dose Route Frequency Provider Last Rate   • acetaminophen  650 mg Oral Q6H PRN Floyce Peon, MD     • albuterol  2 puff Inhalation Q4H PRN hSyann Craft MD     • aluminum-magnesium hydroxide-simethicone  30 mL Oral Q4H PRN Elena Marie MD     • cholecalciferol  1,000 Units Oral QAM Elena Marie MD     • cloZAPine  250 mg Oral HS Royer Cooney MD     • cyanocobalamin  1,000 mcg Intramuscular Q30 Days Skyler Collins MD     • dextromethorphan-guaiFENesin  10 mL Oral Q4H PRN Skyler Collins MD     • fenofibrate  145 mg Oral Daily Skyler Collins MD     • fluticasone  2 puff Inhalation Q12H [address] Skyler Collins MD     • haloperidol lactate  5 mg Intramuscular Q4H PRN Max 4/day Elena MD Frank     • hydrocortisone   Topical 4x Daily PRN Skyler Collins MD     • hydrOXYzine HCL  25 mg Oral Q6H PRN Max 4/day Elena MD Frank     • lamoTRIgine  50 mg Oral BID Royer Cooney MD     • loratadine  10 mg Oral Daily Skyler Collins MD     • LORazepam  1 mg Intramuscular Q6H PRN Max 3/day Elena Marie MD     • LORazepam  0.5 mg Oral Daily Royer Cooney MD     • LORazepam  0.5 mg Oral HS Royer Cooney MD     • LORazepam  0.5 mg Oral Q8H PRN Royer Cooney MD     • nicotine  1 patch Transdermal Daily Skyler Collins MD     • nicotine polacrilex  4 mg Oral Q2H PRN Elena Marie MD     • nystatin-triamcinolone   Topical BID Skyler Collins MD     • risperiDONE  0.25 mg Oral Q4H PRN Max 6/day Elena Marie MD     • risperiDONE  0.5 mg Oral Q4H PRN Max 3/day Elena Marie MD     • risperiDONE  1 mg Oral Q2H PRN Max 3/day Elena Marie MD     • traZODone  50 mg Oral HS PRN Royer Cooney MD     • venlafaxine  150 mg Oral Daily Royer Cooney MD         Risks / Benefits of Treatment:    Risks, benefits, and possible side effects of medications explained to patient and patient verbalizes understanding and agreement for treatment. Counseling / Coordination of Care:    Patient's progress discussed with staff in treatment team meeting. Medications, treatment progress and treatment plan reviewed with patient. Supportive therapy provided to patient.   Group attendance encouraged.     Julius Anguiano MD 10/03/23

## 2023-10-03 NOTE — NURSING NOTE
Patient reported moderate levels of depression/anxiety; denied thoughts of self or other-directed harm. Vague re: AH, stating "they're mild, but were bad yesterday."  Stated she actively experiences AH at her residence and usually feels better when hospitalized. She stated that she likes her group home though "there are a few triggers that are a problem." Did not disclose information other that "I think it's with me." Focused on wanting to ensure she does not get worse following d/c. Positive reinforcement/ encouragement provided. Patient has been cooperative with medications administration. Remains cooperative, though selectively social; requires encouragement to attend structured groups.

## 2023-10-03 NOTE — NURSING NOTE
Alonzo Lemus appears to have slept through the overnight hours without issue. No complaints voiced. No acute behaviors observed. Patient remains in bed sleeping at this time. Continuous safety rounding in progress.

## 2023-10-03 NOTE — SOCIAL WORK
CM met with PT for PT check in. PT was flat, minimal, guarded, reviewed reach out to community supports, PT expressed gratitude for updating them. PT denies si/hi/vh, reported ongoing ah, moderate anxiety and depression. Reassurance provided.

## 2023-10-04 PROCEDURE — 99233 SBSQ HOSP IP/OBS HIGH 50: CPT | Performed by: PSYCHIATRY & NEUROLOGY

## 2023-10-04 PROCEDURE — 90471 IMMUNIZATION ADMIN: CPT | Performed by: FAMILY MEDICINE

## 2023-10-04 PROCEDURE — 90686 IIV4 VACC NO PRSV 0.5 ML IM: CPT | Performed by: FAMILY MEDICINE

## 2023-10-04 RX ORDER — GUAIFENESIN 600 MG/1
600 TABLET, EXTENDED RELEASE ORAL EVERY 12 HOURS SCHEDULED
Status: DISCONTINUED | OUTPATIENT
Start: 2023-10-04 | End: 2023-10-11 | Stop reason: HOSPADM

## 2023-10-04 RX ORDER — CLOZAPINE 100 MG/1
300 TABLET ORAL
Status: DISCONTINUED | OUTPATIENT
Start: 2023-10-04 | End: 2023-10-11 | Stop reason: HOSPADM

## 2023-10-04 RX ORDER — LACTOBACILLUS ACIDOPHILUS / LACTOBACILLUS BULGARICUS 100 MILLION CFU STRENGTH
1 GRANULES ORAL 2 TIMES DAILY
Status: DISCONTINUED | OUTPATIENT
Start: 2023-10-04 | End: 2023-10-11 | Stop reason: HOSPADM

## 2023-10-04 RX ADMIN — LAMOTRIGINE 100 MG: 100 TABLET ORAL at 17:07

## 2023-10-04 RX ADMIN — CLOZAPINE 300 MG: 100 TABLET ORAL at 21:15

## 2023-10-04 RX ADMIN — NYSTATIN AND TRIAMCINOLONE ACETONIDE: 100000; 1 OINTMENT TOPICAL at 17:09

## 2023-10-04 RX ADMIN — NYSTATIN AND TRIAMCINOLONE ACETONIDE: 100000; 1 OINTMENT TOPICAL at 08:13

## 2023-10-04 RX ADMIN — GUAIFENESIN 600 MG: 600 TABLET ORAL at 13:16

## 2023-10-04 RX ADMIN — GUAIFENESIN 600 MG: 600 TABLET ORAL at 22:28

## 2023-10-04 RX ADMIN — LORAZEPAM 1 MG: 1 TABLET ORAL at 21:16

## 2023-10-04 RX ADMIN — RISPERIDONE 1 MG: 1 TABLET ORAL at 16:26

## 2023-10-04 RX ADMIN — FENOFIBRATE 145 MG: 145 TABLET, FILM COATED ORAL at 08:12

## 2023-10-04 RX ADMIN — LORAZEPAM 0.5 MG: 0.5 TABLET ORAL at 08:12

## 2023-10-04 RX ADMIN — LORAZEPAM 0.5 MG: 0.5 TABLET ORAL at 13:16

## 2023-10-04 RX ADMIN — Medication 1 PACKET: at 13:19

## 2023-10-04 RX ADMIN — RISPERIDONE 0.5 MG: 0.5 TABLET ORAL at 11:30

## 2023-10-04 RX ADMIN — VENLAFAXINE HYDROCHLORIDE 150 MG: 150 CAPSULE, EXTENDED RELEASE ORAL at 08:12

## 2023-10-04 RX ADMIN — FLUTICASONE PROPIONATE 2 PUFF: 220 AEROSOL, METERED RESPIRATORY (INHALATION) at 08:12

## 2023-10-04 RX ADMIN — Medication 1000 UNITS: at 08:12

## 2023-10-04 RX ADMIN — LORATADINE 10 MG: 10 TABLET ORAL at 08:12

## 2023-10-04 RX ADMIN — NICOTINE 1 PATCH: 21 PATCH, EXTENDED RELEASE TRANSDERMAL at 08:13

## 2023-10-04 RX ADMIN — GUAIFENESIN AND DEXTROMETHORPHAN 10 ML: 100; 10 SYRUP ORAL at 10:02

## 2023-10-04 RX ADMIN — INFLUENZA VIRUS VACCINE 0.5 ML: 15; 15; 15; 15 SUSPENSION INTRAMUSCULAR at 12:12

## 2023-10-04 RX ADMIN — Medication 1 PACKET: at 22:28

## 2023-10-04 RX ADMIN — LAMOTRIGINE 100 MG: 100 TABLET ORAL at 08:12

## 2023-10-04 NOTE — NURSING NOTE
Patient with mild response from PRN Risperdal. Currently in with , socializing with peers. No outbursts noted. Remains on 7" checks for safety and behaviors.

## 2023-10-04 NOTE — NURSING NOTE
Patient has been visible in the milieu. She participates in groups. Pt has been compliant with her scheduled medications. Her appetite is good. She reports having some mild auditoy hallucinations yesterday that only lasted for "a few minutes" and were not bothersome- she is not currently having any form of hallucinations. She reports mild anxiety and depression. She participated in group today and endorsed coping skills. She denies Si/Hi. She offers no current complaints/ concerns. Plan of care continues. Q7 minute safety checks in progress.

## 2023-10-04 NOTE — PROGRESS NOTES
Progress Note - Caesar Gonzalez 47 y.o. female MRN: [id number]    Unit/Bed#[facility] 209-02 Encounter: [id number]        Subjective:   Patient seen and examined at bedside after reviewing the chart and discussing the case with the caring staff. Patient examined at bedside. Patient reports that her cough is not controlled by Robitussin and wants something else for it. Patient also wants probiotic. She has a rash on her labia for which she was put on an antifungal cream.  Patient is also requesting a cream for hemorrhoids. On review of patient's labs it was found that patient's vitamin D level was normal 52.6 but B12 level is low 245. Physical Exam   Vitals: Blood pressure 98/60, pulse 105, temperature 97.6 °F (36.4 °C), temperature source Temporal, resp. rate 22, height 5' (1.524 m), weight 66 kg (145 lb 9.6 oz), last menstrual period 01/01/2020, SpO2 98 %, not currently breastfeeding. ,Body mass index is 28.44 kg/m². Constitutional: He appears well-developed. HEENT: PERR, EOMI, MMM  Cardiovascular: Normal rate and regular rhythm. Pulmonary/Chest: Effort normal and breath sounds normal.   Abdomen: Soft, + BS, NT    Assessment/Plan:  Caesar Gonzalez is a(n) 47y.o. year old female with MDD     1. Asthma/COPD/cough. Patient will be continued on Flovent twice daily along with albuterol inhaler as needed. I will put the patient on Mucinex twice daily. 2. Tobacco abuse.  Patient is on Nicotine transdermal patch 21 mg/24 hr along with nicotine gum as needed. 3.  Hypertriglyceridemia.  Continue Fenofibrate 145 mg daily.   4. Allergic rhinitis.  Patient is on Claritin 10 mg daily. 5. Vitamin-D deficiency.  Continue vitamin D3 1000 units daily.   I will get vitamin D levels. 6.  Insomnia. Patient is on melatonin 3 mg at bedtime. 7. DJD/osteoarthritis. Patient may get Tylenol as needed. 8.  Fungal rash on the labia. Patient may get Mycolog-II ointment twice daily over the affected area. 9.  Hemorrhoids. Patient may get Anusol cream over the affected area as needed. 10.  New vitamin B12 deficiency. I will put the patient on monthly B12 injections. 11.  Dyspepsia. I will put the patient on Floranex 2 times daily.

## 2023-10-04 NOTE — NURSING NOTE
Patient stated Prn Ativan 0.5 was mildly effective she is currently watching Tv in the day room.  Plan of care continues

## 2023-10-04 NOTE — NURSING NOTE
Patient approached nurse requesting PRN for moderate agitation. Noted pacing unit, wringing hands. Affect flat. Unable to identify cause of agitation. PRN Risperdal 0.5 mg given as ordered. Remains on 7" checks for safety and behaviors.

## 2023-10-04 NOTE — NURSING NOTE
Patient pleasant, no further agitation noted. Auditory hallucinations resolved S/P PRN Risperdal. Remains on 7" checks for safety and behaviors.

## 2023-10-04 NOTE — PROGRESS NOTES
10/04/23    Team Meeting   Meeting Type Daily Rounds   Team Members Present   Team Members Present Physician;Nurse;;Occupational Therapist   Physician Team Member Dr. Yaw Cosby MD; [facility]   Nursing Team Member Rebekah Peterson RN   Care Management Team Member Leo Zhong MS, [facility], [address] [place] [address]   OT Team Member Monalisa Rodriguez, [place]   Patient/Family Present   Patient Present No   Patient's Family Present No   Will return to [facility] when stable and continue with [facility] services. Slept. Prn for anxiety. Blunted reserved, denies ah/vh today, had them yesterday. Medication compliant. Medication adjustment. Participating in groups. Clozaril level due Friday.

## 2023-10-04 NOTE — PLAN OF CARE
Problem: Alteration in Thoughts and Perception  Goal: Agree to be compliant with medication regime, as prescribed and report medication side effects  Description: Interventions:  - Offer appropriate PRN medication and supervise ingestion; conduct AIMS, as needed   Outcome: Progressing     Problem: Ineffective Coping  Goal: Participates in unit activities  Description: Interventions:  - Provide therapeutic environment   - Provide required programming   - Redirect inappropriate behaviors   Outcome: Progressing  Goal: Patient/Family participate in treatment and DC plans  Description: Interventions:  - Provide therapeutic environment  Outcome: Progressing  Goal: Patient/Family verbalizes awareness of resources  Outcome: Progressing  Goal: Understands least restrictive measures  Description: Interventions:  - Utilize least restrictive behavior  Outcome: Progressing  Goal: Free from restraint events  Description: - Utilize least restrictive measures   - Provide behavioral interventions   - Redirect inappropriate behaviors   Outcome: Progressing     Problem: Risk for Self Injury/Neglect  Goal: Verbalize thoughts and feelings  Description: Interventions:  - Assess and re-assess patient's lethality and potential for self-injury  - Engage patient in 1:1 interactions, daily, for a minimum of 15 minutes  - Encourage patient to express feelings, fears, frustrations, hopes  - Establish rapport/trust with patient   Outcome: Progressing  Goal: Refrain from harming self  Description: Interventions:  - Monitor patient closely, per order  - Develop a trusting relationship  - Supervise medication ingestion, monitor effects and side effects   Outcome: Progressing  Goal: Attend and participate in unit activities, including therapeutic, recreational, and educational groups  Description: Interventions:  - Provide therapeutic and educational activities daily, encourage attendance and participation, and document same in the medical record  - Obtain collateral information, encourage visitation and family involvement in care   Outcome: Progressing     Problem: Anxiety  Goal: Anxiety is at manageable level  Description: Interventions:  - Assess and monitor patient's anxiety level. - Monitor for signs and symptoms (heart palpitations, chest pain, shortness of breath, headaches, nausea, feeling jumpy, restlessness, irritable, apprehensive). - Collaborate with interdisciplinary team and initiate plan and interventions as ordered.   - [place] patient to unit/surroundings  - Explain treatment plan  - Encourage participation in care  - Encourage verbalization of concerns/fears  - Identify coping mechanisms  - Assist in developing anxiety-reducing skills  - Administer/offer alternative therapies  - Limit or eliminate stimulants  Outcome: Progressing     Problem: DISCHARGE PLANNING - CARE MANAGEMENT  Goal: Discharge to post-acute care or home with appropriate resources  Description: INTERVENTIONS:  - Conduct assessment to determine patient/family and health care team treatment goals, and need for post-acute services based on payer coverage, community resources, and patient preferences, and barriers to discharge  - Address psychosocial, clinical, and financial barriers to discharge as identified in assessment in conjunction with the patient/family and health care team  - Arrange appropriate level of post-acute services according to patient’s   needs and preference and payer coverage in collaboration with the physician and health care team  - Communicate with and update the patient/family, physician, and health care team regarding progress on the discharge plan  - Arrange appropriate transportation to post-acute venues  Outcome: Progressing

## 2023-10-04 NOTE — NURSING NOTE
Patient with relief from anxiety S/P PRN Ativan. Currently in group, pleasant and cooperative in interaction. Remains on 7" checks for safety and behaviors.

## 2023-10-04 NOTE — NURSING NOTE
Patient expressing moderate anxiety, unable to identify cause. Per patient, anxiety is more internalized thus showing few external symptoms. Noted increased heart and respiratory rate. Martines score of 18. Remains on 7" checks for safety and behaviors.

## 2023-10-04 NOTE — NURSING NOTE
Patient is visible on unit, social with peers. Pleasant and cooperative with staff. Endorses moderate anxiety and depression. Denies SI/HI and hallucinations at this time. No acute behaviors noted. No complaints of pain or discomfort voiced. Medication compliant at HS. Plan of care continues. Q 7 minute safety checks in place.

## 2023-10-04 NOTE — NURSING NOTE
Pt is observed pacing the unit. Pt states she is experiencing AH of voices saying cruel things to her. Pt is severely agitated. Pt was medicated with PRN risperidone 1 mg at 1626. Will monitor for effectiveness.

## 2023-10-04 NOTE — NURSING NOTE
Assumed care of this patient from prior shift nurse at 2300. Patient is currently in bed, appears to be sleeping. No acute behaviors observed. Continuous safety rounding in progress.

## 2023-10-04 NOTE — PROGRESS NOTES
Progress Note - Behavioral Health     Lisa Lewis 47 y.o. female MRN: [id number]   Unit/Bed#: [facility] 209-02 Encounter: [id number]    Behavior over the last 24 hours: unchanged. Carissa Weston was seen for continuing care. Patient reports she slept better last night and did not experience auditory hallucination. Still experiences episodes of high anxious and restlessness and took prn Ativan yesterday afternoon. She verbalizes understanding that her Clozaril will be adjusted to 300 mg all at bedtime tonight. She participated in group this morning and denies any current meds side effects. Admits to feels depressed with fleeting SI but she feels safe in the unit. Staff report some particiaption in groups and on the unit. Sleep: slept better  Appetite: fair  Medication side effects: denies  ROS: all other systems are negative for acute changes    Mental Status Evaluation:    Appearance:  age appropriate, casually dressed   Behavior:  guarded   Speech:  normal rate and volume   Mood:  depressed, anxious   Affect:  constricted   Thought Process:  goal directed   Associations: intact associations   Thought Content:  mild paranoia   Perceptual Disturbances: denies auditory hallucinations when asked   Risk Potential: Suicidal ideation - Yes, passive death wish, contracts for safety on the unit  Homicidal ideation - None at present   Sensorium:  oriented to person, place and time/date   Memory:  recent and remote memory grossly intact   Consciousness:  alert and awake   Attention: attention span and concentration are age appropriate   Insight:  limited   Judgment: fair   Gait/Station: normal gait/station   Motor Activity: no abnormal movements     Vital signs in last 24 hours:    Temp:  [97 °F (36.1 °C)-97.8 °F (36.6 °C)] 97.6 °F (36.4 °C)  HR:  [] 105  Resp:  [18-22] 22  BP: ()/(60-69) 98/60    Laboratory results:   I have personally reviewed all pertinent laboratory/tests results.   Most Recent Labs:   Lab Results Component Value Date    WBC 7.12 10/02/2023    RBC 4.49 10/02/2023    HGB 12.7 10/02/2023    HCT 40.3 10/02/2023     10/02/2023    RDW 15.0 10/02/2023    NEUTROABS 4.81 10/02/2023    SODIUM 137 10/02/2023    K 4.1 10/02/2023     10/02/2023    CO2 27 10/02/2023    BUN 7 10/02/2023    CREATININE 0.54 (L) 10/02/2023    GLUC 101 10/02/2023    GLUF 101 (H) 10/02/2023    CALCIUM 8.8 10/02/2023    AST 14 10/01/2023    ALT 14 10/01/2023    ALKPHOS 80 10/01/2023    TP 7.1 10/01/2023    ALB 4.1 10/01/2023    TBILI 0.23 10/01/2023    CHOLESTEROL 166 10/02/2023    HDL 53 10/02/2023    TRIG 137 10/02/2023    LDLCALC 86 10/02/2023    NONHDLC 113 10/02/2023    AMMONIA 28 10/27/2017    [id number] 2.345 10/01/2023    FREET4 0.89 03/24/2016    PREGUR Negative 11/07/2022    PREGSERUM Negative 10/21/2019    HCG <2.00 04/10/2014    RPR Non-Reactive 12/07/2022    HGBA1C 5.6 12/02/2022     12/02/2022       Assessment/Plan   Principal Problem:    Schizoaffective disorder, bipolar type ([address])  Active Problems:    LYNN (generalized anxiety disorder)    Degenerative disc disease, lumbar    Hyperlipidemia    Post-traumatic stress disorder, chronic    Gastroesophageal reflux disease    Hoarseness    Other headache syndrome    Dependence on nicotine from cigarettes    H/O alcohol dependence ([address])    Recommended Treatment:     Planned medication and treatment changes: All current active medications have been reviewed  Encourage group therapy, milieu therapy and occupational therapy  Behavioral Health checks every 7 minutes   Increase Clozaril to 300 mg hs.       Current Facility-Administered Medications   Medication Dose Route Frequency Provider Last Rate   • acetaminophen  650 mg Oral Q6H PRN Deisi Parrish MD     • albuterol  2 puff Inhalation Q4H PRN Deisi Parrish MD     • aluminum-magnesium hydroxide-simethicone  30 mL Oral Q4H PRN Sylvia Heard MD     • cholecalciferol  1,000 Units Oral QAM Sylvia Heard MD • cloZAPine  300 mg Oral HS Lester Ness MD     • cyanocobalamin  1,000 mcg Intramuscular Q30 Days Thalia Moura MD     • dextromethorphan-guaiFENesin  10 mL Oral Q4H PRN Thalia Moura MD     • fenofibrate  145 mg Oral Daily Thalia Moura MD     • fluticasone  2 puff Inhalation Q12H [facility] Thalia Moura MD     • haloperidol lactate  5 mg Intramuscular Q4H PRN Max 4/day Rona Jin MD     • hydrocortisone   Topical 4x Daily PRN Thalia Moura MD     • hydrOXYzine HCL  25 mg Oral Q6H PRN Max 4/day Rona Jin MD     • lamoTRIgine  100 mg Oral BID Lester Ness MD     • loratadine  10 mg Oral Daily Thalia Moura MD     • LORazepam  1 mg Intramuscular Q6H PRN Max 3/day Rona Jin MD     • LORazepam  0.5 mg Oral Daily Lester Nses MD     • LORazepam  0.5 mg Oral Q8H PRN Lester Ness MD     • LORazepam  1 mg Oral HS Lester Ness MD     • nicotine  1 patch Transdermal Daily Thalia Moura MD     • nicotine polacrilex  4 mg Oral Q2H PRN Rona Jin MD     • nystatin-triamcinolone   Topical BID Thalia Moura MD     • risperiDONE  0.25 mg Oral Q4H PRN Max 6/day Rona Jin MD     • risperiDONE  0.5 mg Oral Q4H PRN Max 3/day Rona Jin MD     • risperiDONE  1 mg Oral Q2H PRN Max 3/day Rona Jin MD     • traZODone  50 mg Oral HS PRN Lester Ness MD     • venlafaxine  150 mg Oral Daily Lester Ness MD         Risks / Benefits of Treatment:    Risks, benefits, and possible side effects of medications explained to patient and patient verbalizes understanding and agreement for treatment. Counseling / Coordination of Care:    Patient's progress discussed with staff in treatment team meeting. Medications, treatment progress and treatment plan reviewed with patient. Supportive therapy provided to patient. Group attendance encouraged.     Judy Schneider MD 10/04/23

## 2023-10-05 PROCEDURE — 99232 SBSQ HOSP IP/OBS MODERATE 35: CPT

## 2023-10-05 RX ORDER — RISPERIDONE 1 MG/1
1 TABLET ORAL
Status: DISCONTINUED | OUTPATIENT
Start: 2023-10-06 | End: 2023-10-09

## 2023-10-05 RX ORDER — RISPERIDONE 0.5 MG/1
0.5 TABLET ORAL 2 TIMES DAILY
Status: DISCONTINUED | OUTPATIENT
Start: 2023-10-05 | End: 2023-10-05

## 2023-10-05 RX ORDER — METHOCARBAMOL 500 MG/1
500 TABLET, FILM COATED ORAL EVERY 6 HOURS PRN
Status: DISCONTINUED | OUTPATIENT
Start: 2023-10-05 | End: 2023-10-11 | Stop reason: HOSPADM

## 2023-10-05 RX ADMIN — LORAZEPAM 0.5 MG: 0.5 TABLET ORAL at 08:13

## 2023-10-05 RX ADMIN — FENOFIBRATE 145 MG: 145 TABLET, FILM COATED ORAL at 08:13

## 2023-10-05 RX ADMIN — LAMOTRIGINE 100 MG: 100 TABLET ORAL at 17:19

## 2023-10-05 RX ADMIN — Medication 1 PACKET: at 08:14

## 2023-10-05 RX ADMIN — GUAIFENESIN 600 MG: 600 TABLET ORAL at 08:13

## 2023-10-05 RX ADMIN — FLUTICASONE PROPIONATE 2 PUFF: 220 AEROSOL, METERED RESPIRATORY (INHALATION) at 21:25

## 2023-10-05 RX ADMIN — RISPERIDONE 1 MG: 1 TABLET ORAL at 11:02

## 2023-10-05 RX ADMIN — CLOZAPINE 300 MG: 100 TABLET ORAL at 21:21

## 2023-10-05 RX ADMIN — LORAZEPAM 1 MG: 1 TABLET ORAL at 21:21

## 2023-10-05 RX ADMIN — NYSTATIN AND TRIAMCINOLONE ACETONIDE: 100000; 1 OINTMENT TOPICAL at 08:14

## 2023-10-05 RX ADMIN — FLUTICASONE PROPIONATE 2 PUFF: 220 AEROSOL, METERED RESPIRATORY (INHALATION) at 08:13

## 2023-10-05 RX ADMIN — VENLAFAXINE HYDROCHLORIDE 150 MG: 150 CAPSULE, EXTENDED RELEASE ORAL at 08:14

## 2023-10-05 RX ADMIN — NYSTATIN AND TRIAMCINOLONE ACETONIDE: 100000; 1 OINTMENT TOPICAL at 21:25

## 2023-10-05 RX ADMIN — Medication 1000 UNITS: at 08:13

## 2023-10-05 RX ADMIN — NICOTINE 1 PATCH: 21 PATCH, EXTENDED RELEASE TRANSDERMAL at 08:13

## 2023-10-05 RX ADMIN — LORAZEPAM 0.5 MG: 0.5 TABLET ORAL at 17:20

## 2023-10-05 RX ADMIN — RISPERIDONE 0.5 MG: 0.5 TABLET ORAL at 16:07

## 2023-10-05 RX ADMIN — LAMOTRIGINE 100 MG: 100 TABLET ORAL at 08:13

## 2023-10-05 RX ADMIN — Medication 1 PACKET: at 21:21

## 2023-10-05 RX ADMIN — LORATADINE 10 MG: 10 TABLET ORAL at 08:13

## 2023-10-05 RX ADMIN — GUAIFENESIN 600 MG: 600 TABLET ORAL at 21:21

## 2023-10-05 NOTE — PROGRESS NOTES
Progress Note - Behavioral Health   Brenda Whitmore 47 y.o. female MRN: [id number]  Unit/Bed#: [facility] 209-02 Encounter: [id number]    Assessment/Plan   Principal Problem:    Schizoaffective disorder, bipolar type ([address])  Active Problems:    LYNN (generalized anxiety disorder)    Degenerative disc disease, lumbar    Hyperlipidemia    Post-traumatic stress disorder, chronic    Gastroesophageal reflux disease    Hoarseness    Other headache syndrome    Dependence on nicotine from cigarettes    H/O alcohol dependence ([address])      Behavior over the last 24 hours:  unchanged  Sleep: normal  Appetite: normal  Medication side effects: No  ROS: no complaints and all other systems are negative    Subjective: [employer] was seen today for psychiatric follow-up. Patient calm, cooperative. She is intermittently visible on the unit with limited participation in the milieu. Patient endorses intermittent command AH of voices telling her to "go home." her mood is controlled on the unit with no recent agitation or aggression toward staff or peers. She denied any SI/HI/VH. Mental Status Evaluation:  Appearance:  age appropriate and casually dressed   Behavior:  calm, cooperative   Speech:  normal pitch and normal volume   Mood:  anxious and depressed   Affect:  constricted   Thought Process:  goal directed   Associations: intact associations   Thought Content:  mild paranoia   Perceptual Disturbances: endorsed command AH of voices telling her to go home   Risk Potential: Suicidal Ideations none at present   Homicidal Ideations none at present  Potential for Aggression No   Sensorium:  person, place and time/date   Memory:  recent and remote memory grossly intact   Consciousness:  alert and awake    Attention: attention span and concentration were age appropriate   Insight:  limited   Judgment: fair   Gait/Station: in bed   Motor Activity: no abnormal movements     Progress Toward Goals: Unchanged.  Patient remain mildly paranoid, depressed and anxious. Risperdal 1 mg po daily after lunch ordered for auditory hallucinations. Patient compliant with current psychotropic medication regimen. Will continue current psychotropic medication regimen. No discharge date at this time. Recommended Treatment: Continue with group therapy, milieu therapy and occupational therapy. Risks, benefits and possible side effects of Medications:   Risks, benefits, and possible side effects of medications explained to patient and patient verbalizes understanding.       Medications:   all current active meds have been reviewed and current meds:   Current Facility-Administered Medications   Medication Dose Route Frequency   • acetaminophen (TYLENOL) tablet 650 mg  650 mg Oral Q6H PRN   • albuterol (PROVENTIL HFA,VENTOLIN HFA) inhaler 2 puff  2 puff Inhalation Q4H PRN   • aluminum-magnesium hydroxide-simethicone (MAALOX) oral suspension 30 mL  30 mL Oral Q4H PRN   • cholecalciferol (VITAMIN D3) tablet 1,000 Units  1,000 Units Oral QAM   • cloZAPine (CLOZARIL) tablet 300 mg  300 mg Oral HS   • cyanocobalamin injection 1,000 mcg  1,000 mcg Intramuscular Q30 Days   • Diclofenac Sodium (VOLTAREN) 1 % topical gel 2 g  2 g Topical 4x Daily PRN   • fenofibrate (TRICOR) tablet 145 mg  145 mg Oral Daily   • fluticasone (FLOVENT HFA) 220 mcg/act inhaler 2 puff  2 puff Inhalation Q12H KENNY   • guaiFENesin (MUCINEX) 12 hr tablet 600 mg  600 mg Oral Q12H KENNY   • haloperidol lactate (HALDOL) injection 5 mg  5 mg Intramuscular Q4H PRN Max 4/day   • hydrocortisone (ANUSOL-HC) 2.5 % rectal cream   Topical 4x Daily PRN   • hydrOXYzine HCL (ATARAX) tablet 25 mg  25 mg Oral Q6H PRN Max 4/day   • lactobacillus acidophilus-bulgaricus (FLORANEX) packet 1 packet  1 packet Oral BID   • lamoTRIgine (LaMICtal) tablet 100 mg  100 mg Oral BID   • loratadine (CLARITIN) tablet 10 mg  10 mg Oral Daily   • LORazepam (ATIVAN) injection 1 mg  1 mg Intramuscular Q6H PRN Max 3/day   • LORazepam (ATIVAN) tablet 0.5 mg  0.5 mg Oral Daily   • LORazepam (ATIVAN) tablet 0.5 mg  0.5 mg Oral Q8H PRN   • LORazepam (ATIVAN) tablet 1 mg  1 mg Oral HS   • methocarbamol (ROBAXIN) tablet 500 mg  500 mg Oral Q6H PRN   • nicotine (NICODERM CQ) 21 mg/24 hr TD 24 hr patch 1 patch  1 patch Transdermal Daily   • nicotine polacrilex (NICORETTE) gum 4 mg  4 mg Oral Q2H PRN   • nystatin-triamcinolone (MYCOLOG-II) ointment   Topical BID   • risperiDONE (RisperDAL) tablet 0.25 mg  0.25 mg Oral Q4H PRN Max 6/day   • risperiDONE (RisperDAL) tablet 0.5 mg  0.5 mg Oral Q4H PRN Max 3/day   • [START ON 10/6/2023] risperiDONE (RisperDAL) tablet 1 mg  1 mg Oral After Lunch   • traZODone (DESYREL) tablet 50 mg  50 mg Oral HS PRN   • venlafaxine (EFFEXOR-XR) 24 hr capsule 150 mg  150 mg Oral Daily   . Labs: I have personally reviewed all pertinent laboratory/tests results. Most Recent Labs:   Lab Results   Component Value Date    WBC 7.12 10/02/2023    RBC 4.49 10/02/2023    HGB 12.7 10/02/2023    HCT 40.3 10/02/2023     10/02/2023    RDW 15.0 10/02/2023    NEUTROABS 4.81 10/02/2023    SODIUM 137 10/02/2023    K 4.1 10/02/2023     10/02/2023    CO2 27 10/02/2023    BUN 7 10/02/2023    CREATININE 0.54 (L) 10/02/2023    GLUC 101 10/02/2023    GLUF 101 (H) 10/02/2023    CALCIUM 8.8 10/02/2023    AST 14 10/01/2023    ALT 14 10/01/2023    ALKPHOS 80 10/01/2023    TP 7.1 10/01/2023    ALB 4.1 10/01/2023    TBILI 0.23 10/01/2023    CHOLESTEROL 166 10/02/2023    HDL 53 10/02/2023    TRIG 137 10/02/2023    LDLCALC 86 10/02/2023    NONHDLC 113 10/02/2023    AMMONIA 28 10/27/2017    [id number] 2.345 10/01/2023    FREET4 0.89 03/24/2016    PREGSERUM Negative 10/21/2019    HCG <2.00 04/10/2014    RPR Non-Reactive 12/07/2022    HGBA1C 5.6 12/02/2022     12/02/2022       Counseling / Coordination of Care  Total floor / unit time spent today 25 minutes.

## 2023-10-05 NOTE — PROGRESS NOTES
10/05/23    Team Meeting   Meeting Type Daily Rounds   Team Members Present   Team Members Present Physician;Nurse;;Occupational Therapist   Physician Team Member , Lucretia Peace MD; Marianne Francisco, [address] [place] [address]   Nursing Team Member Rachael Baptiste RN   Care Management Team Member MS Ericka, [facility] [place], [facility] [place]   OT Team Member Debby Madden [place]   Patient/Family Present   Patient Present No   Patient's Family Present No   Will return to [facility] and continue with act services when stable. Prns, flat, pleasant, cooperative, ah that were derogatory. Follow up ekg, medication adjustment. Doing well in groups.

## 2023-10-05 NOTE — NURSING NOTE
Patient continues with moderate anxiety due to auditory hallucinations. Stated, "There is just not one, there's many". Patient appears anxious, withdrawn to bed. Requested PRN medication for anxiety. PRN Ativan given for moderate anxiety, Martines score of 18. Remains on 7" checks for safety and behaviors.

## 2023-10-05 NOTE — NURSING NOTE
Patient has been visible in the milieu intermittently throughout the day. She has been pleasant and cooperative. She has been compliant with her scheduled medications. Her appetite is good. She reports moderate depression. She also reports auditory hallucinations of mumbled voices. She denies Si/Hi. She is able to make her needs known and offers no current complaints/ concerns. Plan of care continues. Q7 minute safety checks in progress.

## 2023-10-05 NOTE — PROGRESS NOTES
Progress Note - Mariaelena Christianson 47 y.o. female MRN: [id number]    Unit/Bed#[facility] 209-02 Encounter: [id number]        Subjective:   Patient seen and examined at bedside after reviewing the chart and discussing the case with the caring staff. Patient examined at bedside. Patient complaining of chronic low back pain. She states she takes Robaxin at home and is requesting this. Physical Exam   Vitals: Blood pressure 120/79, pulse 99, temperature (!) 97 °F (36.1 °C), temperature source Temporal, resp. rate 18, height 5' (1.524 m), weight 66 kg (145 lb 9.6 oz), last menstrual period 01/01/2020, SpO2 96 %, not currently breastfeeding. ,Body mass index is 28.44 kg/m². Constitutional: Patient in no acute distress. HEENT: PERR, EOMI, MMM, neck supple. Cardiovascular: Normal rate and regular rhythm. Pulmonary/Chest: Effort normal and breath sounds normal.   Abdomen: Soft, + BS, NT. Assessment/Plan:  Mariaelena Christianson is a(n) 47y.o. year old female with schizoaffective disorder.     1. GERD.  Continue Protonix 40 mg daily, Mylanta as needed. 2. Tobacco abuse.  NRT. 3. Hyperlipidemia.  Continue fenofibrate 145 mg daily. 4. Asthma/cough.  Patient is on Flovent twice daily, albuterol inhaler as needed.  She was started on Mucinex twice daily. 5. Allergic rhinitis.  Patient is on Claritin 10 mg daily. 6. Vitamin D deficiency.  Continue vitamin D3 1000 units daily.   7. Vitamin B12 deficiency. Patient started on monthly B12 injections.   8. Hemorrhoids.  Patient may use Anusol cream as needed. 9. Fungal rash on the labia. Patient may use Mycolog-II ointment twice daily over the affected area. 10. Chronic low back pain. Tylenol as needed. Add Robaxin and Voltaren gel as needed. The patient was discussed with Dr. Manpreet Tomas and he is in agreement with the above note.

## 2023-10-05 NOTE — SOCIAL WORK
CM met with PT for PT check in. PT was pleasant in conversation. Reported that the medication are helping with her mood and ah. PT is hopeful that she will be able to return to group home next week as she feels she is making progress. PT continues with ah but reported they are more controlled, denies vh/si/hi, mild anxiety and depression. Reassurance provided.

## 2023-10-05 NOTE — NURSING NOTE
Patient with relief from anxiety at this time. Pleasant and cooperative in interaction. Social with staff. Remains on 7" checks for safety and behaviors.

## 2023-10-05 NOTE — NURSING NOTE
Patient with moderate agitation, Agitated Behavior Score of 30, due to derogatory auditory hallucinations. Pacing unit, wringing hands, appears distressed, irritable edge. Patient requesting PRN medication. PRN Risperdal given as ordered. Remains on 7" checks for safety and behaviors.

## 2023-10-05 NOTE — NURSING NOTE
Patient with moderate relief from agitation, irritability S/P PRN Risperdal. Remains on 7" checks for safety and behaviors. Fall

## 2023-10-05 NOTE — NURSING NOTE
Patient was isolative in the room most of the shift. Patient  complied with medications and meals, was calm and cooperative with staff and pleasant upon approach. Staff maintained Q 7 safety checks, administered medications as prescribed and supported as needed. We will continue to monitor support and encourage.

## 2023-10-05 NOTE — NURSING NOTE
After administering PRN Risperdal 1mg pt reports feeling "better". She reports that the auditory eliana;lucinations have subsided and she feels she is able to function normally at time time.

## 2023-10-05 NOTE — NURSING NOTE
Pt reports having auditory hallucinations of mumbled voices. Agitated behavior Score of "35" calculated. PRN Risperdal 1mg administered at 1102.

## 2023-10-06 PROCEDURE — 99232 SBSQ HOSP IP/OBS MODERATE 35: CPT | Performed by: PSYCHIATRY & NEUROLOGY

## 2023-10-06 RX ORDER — CALCIUM CARBONATE 500 MG/1
500 TABLET, CHEWABLE ORAL 3 TIMES DAILY PRN
Status: DISCONTINUED | OUTPATIENT
Start: 2023-10-06 | End: 2023-10-11 | Stop reason: HOSPADM

## 2023-10-06 RX ORDER — FLUCONAZOLE 150 MG/1
150 TABLET ORAL ONCE
Status: COMPLETED | OUTPATIENT
Start: 2023-10-06 | End: 2023-10-06

## 2023-10-06 RX ORDER — ACETAMINOPHEN 325 MG/1
975 TABLET ORAL EVERY 6 HOURS PRN
Status: DISCONTINUED | OUTPATIENT
Start: 2023-10-06 | End: 2023-10-11 | Stop reason: HOSPADM

## 2023-10-06 RX ORDER — MAGNESIUM HYDROXIDE/ALUMINUM HYDROXICE/SIMETHICONE 120; 1200; 1200 MG/30ML; MG/30ML; MG/30ML
30 SUSPENSION ORAL EVERY 4 HOURS PRN
Status: DISCONTINUED | OUTPATIENT
Start: 2023-10-06 | End: 2023-10-11 | Stop reason: HOSPADM

## 2023-10-06 RX ADMIN — LORAZEPAM 1 MG: 1 TABLET ORAL at 21:35

## 2023-10-06 RX ADMIN — LORATADINE 10 MG: 10 TABLET ORAL at 08:53

## 2023-10-06 RX ADMIN — Medication 1 PACKET: at 08:53

## 2023-10-06 RX ADMIN — Medication 2 G: at 12:10

## 2023-10-06 RX ADMIN — CLOZAPINE 300 MG: 100 TABLET ORAL at 21:34

## 2023-10-06 RX ADMIN — NYSTATIN AND TRIAMCINOLONE ACETONIDE: 100000; 1 OINTMENT TOPICAL at 08:56

## 2023-10-06 RX ADMIN — CALCIUM CARBONATE (ANTACID) CHEW TAB 500 MG 500 MG: 500 CHEW TAB at 14:01

## 2023-10-06 RX ADMIN — FLUCONAZOLE 150 MG: 150 TABLET ORAL at 22:12

## 2023-10-06 RX ADMIN — FLUTICASONE PROPIONATE 2 PUFF: 220 AEROSOL, METERED RESPIRATORY (INHALATION) at 22:13

## 2023-10-06 RX ADMIN — VENLAFAXINE HYDROCHLORIDE 150 MG: 150 CAPSULE, EXTENDED RELEASE ORAL at 08:53

## 2023-10-06 RX ADMIN — MAGNESIUM HYDROXIDE 30 ML: 400 SUSPENSION ORAL at 14:01

## 2023-10-06 RX ADMIN — GUAIFENESIN 600 MG: 600 TABLET ORAL at 21:34

## 2023-10-06 RX ADMIN — Medication 1000 UNITS: at 08:53

## 2023-10-06 RX ADMIN — LORAZEPAM 0.5 MG: 0.5 TABLET ORAL at 08:53

## 2023-10-06 RX ADMIN — CALCIUM CARBONATE (ANTACID) CHEW TAB 500 MG 500 MG: 500 CHEW TAB at 19:00

## 2023-10-06 RX ADMIN — ALUMINUM HYDROXIDE, MAGNESIUM HYDROXIDE, AND DIMETHICONE 30 ML: 200; 20; 200 SUSPENSION ORAL at 12:10

## 2023-10-06 RX ADMIN — RISPERIDONE 1 MG: 1 TABLET ORAL at 13:30

## 2023-10-06 RX ADMIN — NYSTATIN AND TRIAMCINOLONE ACETONIDE: 100000; 1 OINTMENT TOPICAL at 18:03

## 2023-10-06 RX ADMIN — NICOTINE 1 PATCH: 21 PATCH, EXTENDED RELEASE TRANSDERMAL at 08:54

## 2023-10-06 RX ADMIN — LAMOTRIGINE 100 MG: 100 TABLET ORAL at 08:53

## 2023-10-06 RX ADMIN — Medication 1 PACKET: at 21:35

## 2023-10-06 RX ADMIN — LAMOTRIGINE 100 MG: 100 TABLET ORAL at 17:56

## 2023-10-06 RX ADMIN — GUAIFENESIN 600 MG: 600 TABLET ORAL at 08:53

## 2023-10-06 RX ADMIN — FENOFIBRATE 145 MG: 145 TABLET, FILM COATED ORAL at 08:53

## 2023-10-06 RX ADMIN — FLUTICASONE PROPIONATE 2 PUFF: 220 AEROSOL, METERED RESPIRATORY (INHALATION) at 08:52

## 2023-10-06 RX ADMIN — LORAZEPAM 0.5 MG: 0.5 TABLET ORAL at 15:33

## 2023-10-06 RX ADMIN — METHOCARBAMOL 500 MG: 500 TABLET ORAL at 12:10

## 2023-10-06 NOTE — NURSING NOTE
Patient was withdrawn to room majority of the evening, observed lying in be during assesement. Pleasant and cooperative with staff. Endorses anxiety and depression as low. Denies SI/HI and hallucinations at this time, she states that her "head is completely clear" referring to the [facility] she was hearing earlier in the day. No complaints of pain or discomfort made. Medication compliant at HS. Plan of care continues.  Q7 minute safety checks in place

## 2023-10-06 NOTE — PROGRESS NOTES
10/06/23 1533   Martines Anxiety Scale   Anxious Mood 4   Tension 2   Fears 4   Insomnia 0   Intellectual 2   Depressed Mood 3   Somatic Complaints: Muscular 1   Somatic Complaints: Sensory 0   Cardiovascular Symptoms 1   Respiratory Symptoms 0   Gastrointestinal Symptoms 3   Genitourinary Symptoms 0   Autonomic Symptoms 1   Behavior at Interview 2   Martines Anxiety Score 23     Patient complaining of increased anxiety due to voices coming back.  0.5mg Ativan administered at 1533/

## 2023-10-06 NOTE — PROGRESS NOTES
10/06/23 1358   Gastrointestinal   Gastrointestinal (WDL) X   Bowel Sounds (All Quadrants) Hyperactive   Last BM Date 10/05/23  (Reports small BM, continues to report constipation)   GI Symptoms Constipation   Patient complaining of feeling constipated. MOM ordered and administered at 1401.

## 2023-10-06 NOTE — PROGRESS NOTES
Progress Note - Mary Donaldson 47 y.o. female MRN: [id number]    Unit/Bed#[facility] 209-02 Encounter: [id number]        Subjective:   Patient seen and examined at bedside after reviewing the chart and discussing the case with the caring staff. Patient examined at bedside. Patient complaining of constipation. She states she does not want to be on any stool softeners as it "doesn't work."  Patient also complaining of possible yeast infection. She states she was on Augmentin prior to admission and developed vaginal itching. Patient saw her gyn on 9/26/2023 and was prescribed Diflucan but never picked up prescription. Physical Exam   Vitals: Blood pressure 111/77, pulse 87, temperature 98 °F (36.7 °C), temperature source Temporal, resp. rate 18, height 5' (1.524 m), weight 66 kg (145 lb 9.6 oz), last menstrual period 01/01/2020, SpO2 94 %, not currently breastfeeding. ,Body mass index is 28.44 kg/m². Constitutional: Patient in no acute distress. HEENT: PERR, EOMI, MMM, neck supple. Cardiovascular: Normal rate and regular rhythm. Pulmonary/Chest: Effort normal and breath sounds normal.   Abdomen: Soft, + BS, NT. Assessment/Plan:  Mary Donaldson is a(n) 47y.o. year old female with schizoaffective disorder.     1. GERD.  Continue Protonix 40 mg daily, Mylanta as needed. 2. Tobacco abuse.  NRT. 3. Hyperlipidemia.  Continue fenofibrate 145 mg daily. 4. Asthma/cough.  Patient is on Flovent twice daily, albuterol inhaler as needed.  She was started on Mucinex twice daily. 5. Allergic rhinitis.  Patient is on Claritin 10 mg daily. 6. Vitamin D deficiency.  Continue vitamin D3 1000 units daily.   7. Vitamin B12 deficiency. Patient started on monthly B12 injections.   8. Hemorrhoids.  Patient may use Anusol cream as needed. 9. Vulvar rash. Patient may use Mycolog-II ointment twice daily over the affected area. Diflucan 150 mg x 1 dose. 10. Chronic low back pain.   Tylenol, Robaxin and Voltaren gel as needed. 11. Constipation. Add milk of magnesia as needed. The patient was discussed with Dr. Avtar Jensen and he is in agreement with the above note.

## 2023-10-06 NOTE — PROGRESS NOTES
Progress Note - Behavioral Health   [place] Owens 47 y.o. female MRN: [id number]  Unit/Bed#: [facility] 209-02 Encounter: [id number]    Assessment/Plan   Principal Problem:    Schizoaffective disorder, bipolar type (HCC)  Active Problems:    LYNN (generalized anxiety disorder)    Degenerative disc disease, lumbar    Hyperlipidemia    Post-traumatic stress disorder, chronic    Gastroesophageal reflux disease    Hoarseness    Other headache syndrome    Dependence on nicotine from cigarettes    H/O alcohol dependence ([address])      Behavior over the last 24 hours: some Improvement  Sleep: normal  Appetite: normal  Medication side effects: No  ROS: no complaints and all other systems are negative    Subjective: Hill Wynn was seen today for psychiatric follow-up. Patient calm, cooperative. She remains intermittently visible on the unit with some participation in the milieu. Patient endorses intermittent command AH of voices. Her mood is controlled on the unit with no recent agitation or aggression toward staff or peers. She denied any SI/HI/VH. Mental Status Evaluation:  Appearance:  age appropriate and casually dressed   Behavior:  calm, cooperative   Speech:  normal pitch and normal volume   Mood:  anxious and depressed   Affect:  constricted   Thought Process:  goal directed   Associations: intact associations   Thought Content:  mild paranoia   Perceptual Disturbances: endorsed command AH of voices telling her to go home   Risk Potential: Suicidal Ideations none at present   Homicidal Ideations none at present  Potential for Aggression No   Sensorium:  person, place and time/date   Memory:  recent and remote memory grossly intact   Consciousness:  alert and awake    Attention: attention span and concentration were age appropriate   Insight:  limited   Judgment: fair   Gait/Station: in bed   Motor Activity: no abnormal movements     Progress Toward Goals: some improvement.  Patient compliant with the current psychotropic medication regimen. She denies side effects from current psychotropic medication regimen. Will continue current psychotropic medication regimen. No discharge date at this time. Recommended Treatment: Continue with group therapy, milieu therapy and occupational therapy. Risks, benefits and possible side effects of Medications:   Risks, benefits, and possible side effects of medications explained to patient and patient verbalizes understanding.       Medications:   all current active meds have been reviewed and current meds:   Current Facility-Administered Medications   Medication Dose Route Frequency   • acetaminophen (TYLENOL) tablet 650 mg  650 mg Oral Q6H PRN   • albuterol (PROVENTIL HFA,VENTOLIN HFA) inhaler 2 puff  2 puff Inhalation Q4H PRN   • aluminum-magnesium hydroxide-simethicone (MAALOX) oral suspension 30 mL  30 mL Oral Q4H PRN   • cholecalciferol (VITAMIN D3) tablet 1,000 Units  1,000 Units Oral QAM   • cloZAPine (CLOZARIL) tablet 300 mg  300 mg Oral HS   • cyanocobalamin injection 1,000 mcg  1,000 mcg Intramuscular Q30 Days   • Diclofenac Sodium (VOLTAREN) 1 % topical gel 2 g  2 g Topical 4x Daily PRN   • fenofibrate (TRICOR) tablet 145 mg  145 mg Oral Daily   • fluticasone (FLOVENT HFA) 220 mcg/act inhaler 2 puff  2 puff Inhalation Q12H [facility] & half-way   • guaiFENesin (MUCINEX) 12 hr tablet 600 mg  600 mg Oral Q12H KENNY   • haloperidol lactate (HALDOL) injection 5 mg  5 mg Intramuscular Q4H PRN Max 4/day   • hydrocortisone (ANUSOL-HC) 2.5 % rectal cream   Topical 4x Daily PRN   • hydrOXYzine HCL (ATARAX) tablet 25 mg  25 mg Oral Q6H PRN Max 4/day   • lactobacillus acidophilus-bulgaricus (FLORANEX) packet 1 packet  1 packet Oral BID   • lamoTRIgine (LaMICtal) tablet 100 mg  100 mg Oral BID   • loratadine (CLARITIN) tablet 10 mg  10 mg Oral Daily   • LORazepam (ATIVAN) injection 1 mg  1 mg Intramuscular Q6H PRN Max 3/day   • LORazepam (ATIVAN) tablet 0.5 mg  0.5 mg Oral Daily   • LORazepam (ATIVAN) tablet 0.5 mg  0.5 mg Oral Q8H PRN   • LORazepam (ATIVAN) tablet 1 mg  1 mg Oral HS   • methocarbamol (ROBAXIN) tablet 500 mg  500 mg Oral Q6H PRN   • nicotine (NICODERM CQ) 21 mg/24 hr TD 24 hr patch 1 patch  1 patch Transdermal Daily   • nicotine polacrilex (NICORETTE) gum 4 mg  4 mg Oral Q2H PRN   • nystatin-triamcinolone (MYCOLOG-II) ointment   Topical BID   • risperiDONE (RisperDAL) tablet 0.25 mg  0.25 mg Oral Q4H PRN Max 6/day   • risperiDONE (RisperDAL) tablet 0.5 mg  0.5 mg Oral Q4H PRN Max 3/day   • risperiDONE (RisperDAL) tablet 1 mg  1 mg Oral After Lunch   • traZODone (DESYREL) tablet 50 mg  50 mg Oral HS PRN   • venlafaxine (EFFEXOR-XR) 24 hr capsule 150 mg  150 mg Oral Daily   . Labs: I have personally reviewed all pertinent laboratory/tests results. Most Recent Labs:   Lab Results   Component Value Date    WBC 7.12 10/02/2023    RBC 4.49 10/02/2023    HGB 12.7 10/02/2023    HCT 40.3 10/02/2023     10/02/2023    RDW 15.0 10/02/2023    NEUTROABS 4.81 10/02/2023    SODIUM 137 10/02/2023    K 4.1 10/02/2023     10/02/2023    CO2 27 10/02/2023    BUN 7 10/02/2023    CREATININE 0.54 (L) 10/02/2023    GLUC 101 10/02/2023    GLUF 101 (H) 10/02/2023    CALCIUM 8.8 10/02/2023    AST 14 10/01/2023    ALT 14 10/01/2023    ALKPHOS 80 10/01/2023    TP 7.1 10/01/2023    ALB 4.1 10/01/2023    TBILI 0.23 10/01/2023    CHOLESTEROL 166 10/02/2023    HDL 53 10/02/2023    TRIG 137 10/02/2023    LDLCALC 86 10/02/2023    NONHDLC 113 10/02/2023    AMMONIA 28 10/27/2017    [id number] 2.345 10/01/2023    FREET4 0.89 03/24/2016    PREGSERUM Negative 10/21/2019    HCG <2.00 04/10/2014    RPR Non-Reactive 12/07/2022    HGBA1C 5.6 12/02/2022     12/02/2022       Counseling / Coordination of Care  Total floor / unit time spent today 25 minutes.

## 2023-10-06 NOTE — PROGRESS NOTES
10/06/23    Team Meeting   Meeting Type Daily Rounds   Team Members Present   Team Members Present Physician;Nurse;;Occupational Therapist   Physician Team Member Dr. Florencia Avila MD; JARED Duggan   Nursing Team Member Sandi Gold RN   Care Management Team Member MS Ericka, [facility], [facility]   OT Team Member Savi Beth [place]   Patient/Family Present   Patient Present No   Patient's Family Present No   Will return to [facility] and continue with act services. Incontinent this am. mild anxiety and depression. Prns. AH. Medication adjustment. D/C wed.

## 2023-10-06 NOTE — NURSING NOTE
Assumed care of this patient from prior shift nurse at 2300. Patient is currently in bed, appears to be sleeping. No acute behaviors observed. No complaints voiced. Continuous safety rounding in progress.

## 2023-10-06 NOTE — PLAN OF CARE
Problem: Alteration in Thoughts and Perception  Goal: Agree to be compliant with medication regime, as prescribed and report medication side effects  Description: Interventions:  - Offer appropriate PRN medication and supervise ingestion; conduct AIMS, as needed   Outcome: Progressing     Problem: Ineffective Coping  Goal: Patient/Family participate in treatment and DC plans  Description: Interventions:  - Provide therapeutic environment  Outcome: Progressing  Goal: Patient/Family verbalizes awareness of resources  Outcome: Progressing  Goal: Understands least restrictive measures  Description: Interventions:  - Utilize least restrictive behavior  Outcome: Progressing  Goal: Free from restraint events  Description: - Utilize least restrictive measures   - Provide behavioral interventions   - Redirect inappropriate behaviors   Outcome: Progressing     Problem: Risk for Self Injury/Neglect  Goal: Verbalize thoughts and feelings  Description: Interventions:  - Assess and re-assess patient's lethality and potential for self-injury  - Engage patient in 1:1 interactions, daily, for a minimum of 15 minutes  - Encourage patient to express feelings, fears, frustrations, hopes  - Establish rapport/trust with patient   Outcome: Progressing  Goal: Refrain from harming self  Description: Interventions:  - Monitor patient closely, per order  - Develop a trusting relationship  - Supervise medication ingestion, monitor effects and side effects   Outcome: Progressing     Problem: Depression  Goal: Refrain from isolation  Description: Interventions:  - Develop a trusting relationship   - Encourage socialization   Outcome: Not Progressing     Problem: Anxiety  Goal: Anxiety is at manageable level  Description: Interventions:  - Assess and monitor patient's anxiety level. - Monitor for signs and symptoms (heart palpitations, chest pain, shortness of breath, headaches, nausea, feeling jumpy, restlessness, irritable, apprehensive). - Collaborate with interdisciplinary team and initiate plan and interventions as ordered.   - [place] patient to unit/surroundings  - Explain treatment plan  - Encourage participation in care  - Encourage verbalization of concerns/fears  - Identify coping mechanisms  - Assist in developing anxiety-reducing skills  - Administer/offer alternative therapies  - Limit or eliminate stimulants  Outcome: Progressing

## 2023-10-06 NOTE — NURSING NOTE
Patient presents with flat affect. She endorses feeling moderate depressed and anxious this morning because although she is currently not hearing voices, she is worried that the voices will come back and she does not want any more medication changes. Denies suicidal thoughts at this time. She is reporting feeling constipated as well as indigestion. PRN administered for symptoms accordingly. She is compliant with all medications.

## 2023-10-07 PROCEDURE — 99232 SBSQ HOSP IP/OBS MODERATE 35: CPT | Performed by: NURSE PRACTITIONER

## 2023-10-07 RX ORDER — SORBITOL SOLUTION 70 %
30 SOLUTION, ORAL MISCELLANEOUS
Status: DISCONTINUED | OUTPATIENT
Start: 2023-10-07 | End: 2023-10-09

## 2023-10-07 RX ADMIN — LAMOTRIGINE 100 MG: 100 TABLET ORAL at 08:38

## 2023-10-07 RX ADMIN — RISPERIDONE 0.5 MG: 0.5 TABLET ORAL at 19:46

## 2023-10-07 RX ADMIN — NYSTATIN AND TRIAMCINOLONE ACETONIDE: 100000; 1 OINTMENT TOPICAL at 08:57

## 2023-10-07 RX ADMIN — SORBITOL SOLUTION (BULK) 30 ML: 70 SOLUTION at 19:46

## 2023-10-07 RX ADMIN — FENOFIBRATE 145 MG: 145 TABLET, FILM COATED ORAL at 08:38

## 2023-10-07 RX ADMIN — VENLAFAXINE HYDROCHLORIDE 150 MG: 150 CAPSULE, EXTENDED RELEASE ORAL at 08:38

## 2023-10-07 RX ADMIN — RISPERIDONE 0.5 MG: 0.5 TABLET ORAL at 10:23

## 2023-10-07 RX ADMIN — MAGNESIUM HYDROXIDE 30 ML: 400 SUSPENSION ORAL at 08:57

## 2023-10-07 RX ADMIN — Medication 1 PACKET: at 08:37

## 2023-10-07 RX ADMIN — GUAIFENESIN 600 MG: 600 TABLET ORAL at 21:29

## 2023-10-07 RX ADMIN — LORAZEPAM 0.5 MG: 0.5 TABLET ORAL at 08:38

## 2023-10-07 RX ADMIN — FLUTICASONE PROPIONATE 2 PUFF: 220 AEROSOL, METERED RESPIRATORY (INHALATION) at 08:58

## 2023-10-07 RX ADMIN — CLOZAPINE 300 MG: 100 TABLET ORAL at 21:29

## 2023-10-07 RX ADMIN — LORATADINE 10 MG: 10 TABLET ORAL at 08:38

## 2023-10-07 RX ADMIN — GUAIFENESIN 600 MG: 600 TABLET ORAL at 08:38

## 2023-10-07 RX ADMIN — LORAZEPAM 1 MG: 1 TABLET ORAL at 21:29

## 2023-10-07 RX ADMIN — SORBITOL SOLUTION (BULK) 30 ML: 70 SOLUTION at 15:53

## 2023-10-07 RX ADMIN — LORAZEPAM 0.5 MG: 0.5 TABLET ORAL at 13:37

## 2023-10-07 RX ADMIN — Medication 1 PACKET: at 21:31

## 2023-10-07 RX ADMIN — SORBITOL SOLUTION (BULK) 30 ML: 70 SOLUTION at 17:45

## 2023-10-07 RX ADMIN — RISPERIDONE 1 MG: 1 TABLET ORAL at 13:37

## 2023-10-07 RX ADMIN — Medication 1000 UNITS: at 08:38

## 2023-10-07 RX ADMIN — NICOTINE 1 PATCH: 21 PATCH, EXTENDED RELEASE TRANSDERMAL at 08:38

## 2023-10-07 RX ADMIN — LAMOTRIGINE 100 MG: 100 TABLET ORAL at 17:44

## 2023-10-07 NOTE — NURSING NOTE
Pt stated she was hearing voices, increasingly agitated, slammed door when this writer stated he needed to review pt's chart before giving medication and to give them a few minutes. Administered 0.5 risperdal @ 1946, also PRN sorbitol as well since pt stated she did not have bowel movement. Broset 2, agitation behavior scale 22, will monitor for effectiveness.

## 2023-10-07 NOTE — NURSING NOTE
Patient reassessed one hour after taking PRN Risperdal. She states she threw up shortly after taking it and therefore, it is not effective for her. She continues to report AH and is upset by the voices.

## 2023-10-07 NOTE — NURSING NOTE
Pt assessed in room. Endorsed depression and anxiety as mild, endorsed AH when asked to elaborate on them she stated "I don't really want to discuss it at this time."  Pt denied SI/HI and VH. Pt was med compliant with all HS medications. After building a rapport , pt stated she fears war and not being able to prepare in time. She stated she hears animals talking to her but doesn't remember the words. Pt was pleasant and cooperative. Remained in bed for the evening. Q7 minutes safety checks maintained. Will continue to monitor.

## 2023-10-07 NOTE — PROGRESS NOTES
10/07/23 1337   Martines Anxiety Scale   Anxious Mood 4   Tension 3   Fears 3   Insomnia 0   Intellectual 2   Depressed Mood 2   Somatic Complaints: Muscular 1   Somatic Complaints: Sensory 0   Cardiovascular Symptoms 1   Respiratory Symptoms 0   Gastrointestinal Symptoms 4   Genitourinary Symptoms 0   Autonomic Symptoms 1   Behavior at Interview 3   Martines Anxiety Score 24     Patient complaining of increased anxiety. Martines score indicates moderate anxiety. 0.5mg Ativan administered as indicated by Martines score at 1337.

## 2023-10-07 NOTE — NURSING NOTE
Patient continues to report feelings of constipation. PRN Sorbitol ordered and administered at 1553 and 1745. No BM thus far.

## 2023-10-07 NOTE — PROGRESS NOTES
10/07/23 1000   Broset Violence Checklist   Assessment type Shift   Irritability 1   Confusion 0   Boisterousness 0   Threatening physical violence 0   Verbal threats 0   Violence 0   Broset score 1   Patient slightly irritable and reports hearing voices at this time that are causing her distress. 0.5mg Risperdal administered at 1023.

## 2023-10-07 NOTE — PLAN OF CARE
Problem: Alteration in Thoughts and Perception  Goal: Treatment Goal: Gain control of psychotic behaviors/thinking, reduce/eliminate presenting symptoms and demonstrate improved reality functioning upon discharge  Outcome: Progressing  Goal: Refrain from acting on delusional thinking/internal stimuli  Description: Interventions:  - Monitor patient closely, per order   - Utilize least restrictive measures   - Set reasonable limits, give positive feedback for acceptable   - Administer medications as ordered and monitor of potential side effects  Outcome: Progressing  Goal: Agree to be compliant with medication regime, as prescribed and report medication side effects  Description: Interventions:  - Offer appropriate PRN medication and supervise ingestion; conduct AIMS, as needed   Outcome: Progressing     Problem: Ineffective Coping  Goal: Understands least restrictive measures  Description: Interventions:  - Utilize least restrictive behavior  Outcome: Progressing  Goal: Free from restraint events  Description: - Utilize least restrictive measures   - Provide behavioral interventions   - Redirect inappropriate behaviors   Outcome: Progressing     Problem: Risk for Self Injury/Neglect  Goal: Verbalize thoughts and feelings  Description: Interventions:  - Assess and re-assess patient's lethality and potential for self-injury  - Engage patient in 1:1 interactions, daily, for a minimum of 15 minutes  - Encourage patient to express feelings, fears, frustrations, hopes  - Establish rapport/trust with patient   Outcome: Progressing  Goal: Refrain from harming self  Description: Interventions:  - Monitor patient closely, per order  - Develop a trusting relationship  - Supervise medication ingestion, monitor effects and side effects   Outcome: Progressing     Problem: Depression  Goal: Refrain from isolation  Description: Interventions:  - Develop a trusting relationship   - Encourage socialization   Outcome: Progressing  Goal: Refrain from self-neglect  Outcome: Progressing     Problem: Anxiety  Goal: Anxiety is at manageable level  Description: Interventions:  - Assess and monitor patient's anxiety level. - Monitor for signs and symptoms (heart palpitations, chest pain, shortness of breath, headaches, nausea, feeling jumpy, restlessness, irritable, apprehensive). - Collaborate with interdisciplinary team and initiate plan and interventions as ordered.   - [place] patient to unit/surroundings  - Explain treatment plan  - Encourage participation in care  - Encourage verbalization of concerns/fears  - Identify coping mechanisms  - Assist in developing anxiety-reducing skills  - Administer/offer alternative therapies  - Limit or eliminate stimulants  Outcome: Progressing

## 2023-10-07 NOTE — NURSING NOTE
Upon reassessment of PRN Ativan, patient observed to be resting in bed. Reports feeling less anxious and reports her voices are quieting. Medication effective.

## 2023-10-07 NOTE — PROGRESS NOTES
Progress Note - Behavioral Health     [place] Graciela 47 y.o. female MRN: [id number]   Unit/Bed#[facility]anesh Garcia 209-02 Encounter: [id number]      Documentation, nursing notes, medication reconciliation, labs, and vitals reviewed. Patient was seen for continuing care and reviewed with treatment team. No acute events over the past 24 hours. Per nursing report, he continues to require PRNs throughout the day for anxiety and complaints of hallucinations. Medication changes over the past 24 hours. Continues to tolerate current medications with no adverse effects. On evaluation today, she is isolative to her room. Presents irritable and continues to report hearing voices causing her distress. Isolative to her room. Some improvement of symptoms with PRNs of Risperdal and hydroxyzine. Sleep: slept off and on  Appetite: fair  Medication side effects: No   ROS: no complaints, all other systems are negative    Mental Status Evaluation:    Appearance:  marginal hygiene   Behavior:  guarded   Speech:  normal rate, normal volume   Mood:  depressed, anxious   Affect:  labile   Thought Process:  circumstantial   Associations: tangential associations   Thought Content:  paranoid ideation   Perceptual Disturbances: auditory hallucinations   Risk Potential: Suicidal ideation - None  Homicidal ideation - None  Potential for aggression - No   Sensorium:  oriented to person, place and time/date   Memory:  recent and remote memory grossly intact   Consciousness:  alert and awake   Attention: decreased concentration and decreased attention span   Insight:  limited   Judgment: limited   Gait/Station: normal gait/station, normal balance   Motor Activity: no abnormal movements     Vital signs in last 24 hours:    Temp:  [98 °F (36.7 °C)-98.2 °F (36.8 °C)] 98 °F (36.7 °C)  HR:  [84-95] 84  Resp:  [18] 18  BP: (110-112)/(60-77) 110/60    Laboratory results: I have personally reviewed all pertinent laboratory/tests results.       Progress Toward Goals: progressing    Assessment/Plan   Principal Problem:    Schizoaffective disorder, bipolar type (HCC)  Active Problems:    LYNN (generalized anxiety disorder)    Degenerative disc disease, lumbar    Hyperlipidemia    Post-traumatic stress disorder, chronic    Gastroesophageal reflux disease    Hoarseness    Other headache syndrome    Dependence on nicotine from cigarettes    H/O alcohol dependence ([address])    Recommended Treatment:     Planned medication and treatment changes: All current active medications have been reviewed  Encourage group therapy, milieu therapy and occupational therapy  Behavioral Health checks every 7 minutes    Requires continued inpatient treatment due to chronic illness and high risk of decompensation if discharged before long term stability is achieved.     Continue current medications:  Current Facility-Administered Medications   Medication Dose Route Frequency Provider Last Rate   • acetaminophen  650 mg Oral Q6H PRN Mai Bautista MD     • acetaminophen  975 mg Oral Q6H PRN Payton Conrad PA-C     • albuterol  2 puff Inhalation Q4H PRN Mai Bautista MD     • aluminum-magnesium hydroxide-simethicone  30 mL Oral Q4H PRN Payton Conrad PA-C     • calcium carbonate  500 mg Oral TID PRN Payton Conrad PA-C     • cholecalciferol  1,000 Units Oral QAM Lynnette Thomason MD     • cloZAPine  300 mg Oral HS Mary Perez MD     • cyanocobalamin  1,000 mcg Intramuscular Q30 Days Mai Bautista MD     • Diclofenac Sodium  2 g Topical 4x Daily PRN Payton Conrad PA-C     • fenofibrate  145 mg Oral Daily Mai Bautista MD     • fluticasone  2 puff Inhalation Q12H [facility] Mai Bautista MD     • guaiFENesin  600 mg Oral Q12H [facility] Mai Bautista MD     • haloperidol lactate  5 mg Intramuscular Q4H PRN Max 4/day Lynnette Thomason MD     • hydrocortisone   Topical 4x Daily PRN Mai Bautista MD     • hydrOXYzine HCL  25 mg Oral Q6H PRN Max 4/day Lynnette Thomason MD     • lactobacillus acidophilus-bulgaricus  1 packet Oral BID Anh Erwin MD     • lamoTRIgine  100 mg Oral BID Adonis Link MD     • loratadine  10 mg Oral Daily Anh Erwin MD     • LORazepam  1 mg Intramuscular Q6H PRN Max 3/day Marah Merritt MD     • LORazepam  0.5 mg Oral Daily Adonis Link MD     • LORazepam  0.5 mg Oral Q8H PRN Adonis Link MD     • LORazepam  1 mg Oral HS Adonis Link MD     • magnesium hydroxide  30 mL Oral Daily PRN Payton Conrad PA-C     • methocarbamol  500 mg Oral Q6H PRN Payton Conrad PA-C     • nicotine  1 patch Transdermal Daily Anh Erwin MD     • nicotine polacrilex  4 mg Oral Q2H PRN Marah Merritt MD     • nystatin-triamcinolone   Topical BID Anh Erwin MD     • risperiDONE  0.25 mg Oral Q4H PRN Max 6/day Marah Merritt MD     • risperiDONE  0.5 mg Oral Q4H PRN Max 3/day Marah Merritt MD     • risperiDONE  1 mg Oral After Lunch JARED Carson     • traZODone  50 mg Oral HS PRN Adonis Link MD     • venlafaxine  150 mg Oral Daily Adonis Link MD         Risks / Benefits of Treatment:    Risks, benefits, and possible side effects of medications explained to patient and patient verbalizes understanding and agreement for treatment. Counseling / Coordination of Care:    Patient's progress discussed with staff in treatment team meeting. Medications, treatment progress and treatment plan reviewed with patient.     JARED Teague 10/07/23

## 2023-10-07 NOTE — NURSING NOTE
Patient presents with flat affect and appears depressed. Upon initial assessment this morning, when asked about mood, she replied, "I just woke up." Was minimal in conversation at this time. She withdrew to her room after breakfast. Denies hallucinations this morning. Compliant with medications.

## 2023-10-07 NOTE — PROGRESS NOTES
Progress Note - Zully Diana 47 y.o. female MRN: [id number]    Unit/Bed#[facility] 209-02 Encounter: [id number]        Subjective:   Patient seen and examined at bedside after reviewing the chart and discussing the case with the caring staff. Patient examined at bedside. Patient complaining of constipation. Patient reports that the other medicines are not working. Patient had vomiting episode this morning as well as yesterday evening. Patient is requesting STD screening and is also asking for Ensure juice. Physical Exam   Vitals: Blood pressure 110/60, pulse 84, temperature 98 °F (36.7 °C), temperature source Temporal, resp. rate 18, height 5' (1.524 m), weight 67 kg (147 lb 12.8 oz), last menstrual period 01/01/2020, SpO2 96 %, not currently breastfeeding. ,Body mass index is 28.87 kg/m². Constitutional: Patient in no acute distress. HEENT: PERR, EOMI, MMM, neck supple. Cardiovascular: Normal rate and regular rhythm. Pulmonary/Chest: Effort normal and breath sounds normal.   Abdomen: Soft, + BS, NT. Assessment/Plan:  Zully Diana is a(n) 47y.o. year old female with schizoaffective disorder.     1. GERD.  Continue Protonix 40 mg daily, Mylanta as needed. 2. Tobacco abuse.  NRT. 3. Hyperlipidemia.  Continue fenofibrate 145 mg daily. 4. Asthma/cough.  Patient is on Flovent twice daily, albuterol inhaler as needed.  She was started on Mucinex twice daily. 5. Allergic rhinitis.  Patient is on Claritin 10 mg daily. 6. Vitamin D deficiency.  Continue vitamin D3 1000 units daily.   7. Vitamin B12 deficiency. Patient started on monthly B12 injections.   8. Hemorrhoids.  Patient may use Anusol cream as needed. 9. Vulvar rash. Patient may use Mycolog-II ointment twice daily over the affected area. Diflucan 150 mg x 1 dose. I will order STD profile. 10. Chronic low back pain. Tylenol, Robaxin and Voltaren gel as needed. 11. Constipation.   We will put the patient on sorbitol 70% every 2 hours x 3 as needed for constipation. Add milk of magnesia as needed.

## 2023-10-08 PROCEDURE — 99232 SBSQ HOSP IP/OBS MODERATE 35: CPT | Performed by: NURSE PRACTITIONER

## 2023-10-08 RX ORDER — CYANOCOBALAMIN 1000 UG/ML
1000 INJECTION, SOLUTION INTRAMUSCULAR; SUBCUTANEOUS
Qty: 1 ML | Refills: 0 | Status: SHIPPED | OUTPATIENT
Start: 2023-11-02

## 2023-10-08 RX ORDER — BISACODYL 5 MG/1
5 TABLET, DELAYED RELEASE ORAL DAILY PRN
Status: DISCONTINUED | OUTPATIENT
Start: 2023-10-08 | End: 2023-10-09

## 2023-10-08 RX ORDER — CALCIUM CARBONATE 500 MG/1
500 TABLET, CHEWABLE ORAL 3 TIMES DAILY PRN
Qty: 60 TABLET | Refills: 0 | Status: SHIPPED | OUTPATIENT
Start: 2023-10-08

## 2023-10-08 RX ORDER — LACTOBACILLUS ACIDOPHILUS / LACTOBACILLUS BULGARICUS 100 MILLION CFU STRENGTH
1 GRANULES ORAL 2 TIMES DAILY
Qty: 60 EACH | Refills: 0 | Status: SHIPPED | OUTPATIENT
Start: 2023-10-08

## 2023-10-08 RX ORDER — MELATONIN
1000 EVERY MORNING
Qty: 30 TABLET | Refills: 0 | Status: SHIPPED | OUTPATIENT
Start: 2023-10-08

## 2023-10-08 RX ORDER — NYSTATIN AND TRIAMCINOLONE ACETONIDE 100000; 1 [USP'U]/G; MG/G
OINTMENT TOPICAL 2 TIMES DAILY
Qty: 60 G | Refills: 0 | Status: SHIPPED | OUTPATIENT
Start: 2023-10-08

## 2023-10-08 RX ORDER — GUAIFENESIN 600 MG/1
600 TABLET, EXTENDED RELEASE ORAL EVERY 12 HOURS SCHEDULED
Qty: 20 TABLET | Refills: 0 | Status: SHIPPED | OUTPATIENT
Start: 2023-10-08

## 2023-10-08 RX ORDER — HYDROCORTISONE 10 MG/G
1 CREAM TOPICAL 4 TIMES DAILY PRN
Qty: 30 G | Refills: 0 | Status: SHIPPED | OUTPATIENT
Start: 2023-10-08

## 2023-10-08 RX ORDER — FENOFIBRATE 145 MG/1
145 TABLET, COATED ORAL DAILY
Qty: 28 TABLET | Refills: 0 | Status: SHIPPED | OUTPATIENT
Start: 2023-10-08

## 2023-10-08 RX ORDER — ALBUTEROL SULFATE 90 UG/1
2 AEROSOL, METERED RESPIRATORY (INHALATION) EVERY 4 HOURS PRN
Qty: 18 G | Refills: 0 | Status: SHIPPED | OUTPATIENT
Start: 2023-10-08

## 2023-10-08 RX ORDER — METHOCARBAMOL 500 MG/1
500 TABLET, FILM COATED ORAL EVERY 6 HOURS PRN
Qty: 90 TABLET | Refills: 0 | Status: SHIPPED | OUTPATIENT
Start: 2023-10-08 | End: 2023-11-07

## 2023-10-08 RX ORDER — MAGNESIUM HYDROXIDE/ALUMINUM HYDROXICE/SIMETHICONE 120; 1200; 1200 MG/30ML; MG/30ML; MG/30ML
30 SUSPENSION ORAL EVERY 4 HOURS PRN
Qty: 769 ML | Refills: 0 | Status: SHIPPED | OUTPATIENT
Start: 2023-10-08

## 2023-10-08 RX ORDER — CETIRIZINE HYDROCHLORIDE 10 MG/1
10 TABLET ORAL DAILY
Qty: 30 TABLET | Refills: 0 | Status: SHIPPED | OUTPATIENT
Start: 2023-10-08

## 2023-10-08 RX ORDER — FLUTICASONE PROPIONATE 50 MCG
1 BLISTER, WITH INHALATION DEVICE INHALATION 2 TIMES DAILY
Qty: 60 BLISTER | Refills: 0 | Status: SHIPPED | OUTPATIENT
Start: 2023-10-08 | End: 2023-11-07

## 2023-10-08 RX ORDER — BISACODYL 5 MG/1
5 TABLET, DELAYED RELEASE ORAL DAILY PRN
Qty: 30 TABLET | Refills: 0 | Status: SHIPPED | OUTPATIENT
Start: 2023-10-08

## 2023-10-08 RX ORDER — NICOTINE 21 MG/24HR
1 PATCH, TRANSDERMAL 24 HOURS TRANSDERMAL DAILY
Qty: 28 PATCH | Refills: 0 | Status: SHIPPED | OUTPATIENT
Start: 2023-10-09

## 2023-10-08 RX ORDER — LAMOTRIGINE 100 MG/1
100 TABLET ORAL 2 TIMES DAILY
Status: DISCONTINUED | OUTPATIENT
Start: 2023-10-08 | End: 2023-10-11 | Stop reason: HOSPADM

## 2023-10-08 RX ADMIN — CALCIUM CARBONATE (ANTACID) CHEW TAB 500 MG 500 MG: 500 CHEW TAB at 13:28

## 2023-10-08 RX ADMIN — RISPERIDONE 0.5 MG: 0.5 TABLET ORAL at 16:41

## 2023-10-08 RX ADMIN — NICOTINE 1 PATCH: 21 PATCH, EXTENDED RELEASE TRANSDERMAL at 09:46

## 2023-10-08 RX ADMIN — Medication 1 PACKET: at 21:16

## 2023-10-08 RX ADMIN — MAGNESIUM HYDROXIDE 30 ML: 400 SUSPENSION ORAL at 15:52

## 2023-10-08 RX ADMIN — Medication 1 PACKET: at 09:42

## 2023-10-08 RX ADMIN — GUAIFENESIN 600 MG: 600 TABLET ORAL at 09:41

## 2023-10-08 RX ADMIN — LORAZEPAM 1 MG: 1 TABLET ORAL at 21:17

## 2023-10-08 RX ADMIN — LORATADINE 10 MG: 10 TABLET ORAL at 09:42

## 2023-10-08 RX ADMIN — CALCIUM CARBONATE (ANTACID) CHEW TAB 500 MG 500 MG: 500 CHEW TAB at 19:58

## 2023-10-08 RX ADMIN — LORAZEPAM 0.5 MG: 0.5 TABLET ORAL at 15:28

## 2023-10-08 RX ADMIN — VENLAFAXINE HYDROCHLORIDE 150 MG: 150 CAPSULE, EXTENDED RELEASE ORAL at 09:41

## 2023-10-08 RX ADMIN — LAMOTRIGINE 100 MG: 100 TABLET ORAL at 13:31

## 2023-10-08 RX ADMIN — NYSTATIN AND TRIAMCINOLONE ACETONIDE: 100000; 1 OINTMENT TOPICAL at 21:24

## 2023-10-08 RX ADMIN — LORAZEPAM 0.5 MG: 0.5 TABLET ORAL at 09:42

## 2023-10-08 RX ADMIN — GUAIFENESIN 600 MG: 600 TABLET ORAL at 21:16

## 2023-10-08 RX ADMIN — CLOZAPINE 300 MG: 100 TABLET ORAL at 21:17

## 2023-10-08 RX ADMIN — NYSTATIN AND TRIAMCINOLONE ACETONIDE 1 APPLICATION: 100000; 1 OINTMENT TOPICAL at 09:43

## 2023-10-08 RX ADMIN — Medication 1000 UNITS: at 09:41

## 2023-10-08 RX ADMIN — RISPERIDONE 1 MG: 1 TABLET ORAL at 13:30

## 2023-10-08 RX ADMIN — LAMOTRIGINE 100 MG: 100 TABLET ORAL at 09:41

## 2023-10-08 RX ADMIN — FLUTICASONE PROPIONATE 2 PUFF: 220 AEROSOL, METERED RESPIRATORY (INHALATION) at 09:42

## 2023-10-08 RX ADMIN — FENOFIBRATE 145 MG: 145 TABLET, FILM COATED ORAL at 09:42

## 2023-10-08 RX ADMIN — BISACODYL 5 MG: 5 TABLET, COATED ORAL at 21:17

## 2023-10-08 NOTE — QUICK NOTE
Pt was head yelling in the hallway and came ot the nurses station to report feeling, "like I'm going crazy." Pt reported hearing voices that were negative and this really was bothering her. Pt requested and injection but after discussing options with RN, decided to try a 0.5mg Risperdal first and see if that would be effective. Pt also was asked whether being in her room or the milieu was more effective during these outbursts and she stated being around people helps. Pt chose to sit in the tv room prior to dinner and to give that a try.

## 2023-10-08 NOTE — PROGRESS NOTES
Progress Note - Behavioral Health     Chuck Brown 47 y.o. female MRN: [id number]   Unit/Bed#[place] 209-02 Encounter: [id number]      Documentation, nursing notes, medication reconciliation, labs, and vitals reviewed. Patient was seen for continuing care and reviewed with treatment team. No acute events over the past 24 hours. Per nursing report, continues to be isolative, complains of intermittent hallucinations, improved insight, cooperative. No medication changes over the past 24 hours. Continues to tolerate current medications with no adverse effects. On evaluation today, seen in her room and in bed. States hallucinations are better right now, but notes they are improved in the afternoon. She asked if she could take her Lamictal at 2 PM with her Risperdal instead of at 6 PM, she believes this may help with the hallucinations in the afternoon. We will make that adjustment.     Sleep: slept off and on  Appetite: fair  Medication side effects: No   ROS: no complaints, all other systems are negative    Mental Status Evaluation:    Appearance:  age appropriate   Behavior:  cooperative, guarded   Speech:  slow, soft   Mood:  dysphoric   Affect:  blunted   Thought Process:  circumstantial   Associations: concrete associations   Thought Content:  some paranoia   Perceptual Disturbances: auditory hallucinations   Risk Potential: Suicidal ideation - None at present  Homicidal ideation - None  Potential for aggression - No   Sensorium:  oriented to person, place, time/date and situation   Memory:  recent and remote memory grossly intact   Consciousness:  alert and awake   Attention: decreased concentration and decreased attention span   Insight:  limited   Judgment: limited   Gait/Station: normal gait/station, normal balance   Motor Activity: no abnormal movements     Vital signs in last 24 hours:    Temp:  [97.5 °F (36.4 °C)-97.7 °F (36.5 °C)] 97.5 °F (36.4 °C)  HR:  [] 78  Resp:  [18-20] 18  BP: (99121)/(62-85) 99/62    Laboratory results: I have personally reviewed all pertinent laboratory/tests results. Progress Toward Goals: continues to improve    Assessment/Plan   Principal Problem:    Schizoaffective disorder, bipolar type (HCC)  Active Problems:    LYNN (generalized anxiety disorder)    Degenerative disc disease, lumbar    Hyperlipidemia    Post-traumatic stress disorder, chronic    Gastroesophageal reflux disease    Hoarseness    Other headache syndrome    Dependence on nicotine from cigarettes    H/O alcohol dependence ([address])    Recommended Treatment:     Planned medication and treatment changes: All current active medications have been reviewed  Encourage group therapy, milieu therapy and occupational therapy  Behavioral Health checks every 7 minutes    Requires continued inpatient treatment due to chronic illness and high risk of decompensation if discharged before long term stability is achieved.     Continue current medications:  Current Facility-Administered Medications   Medication Dose Route Frequency Provider Last Rate   • acetaminophen  650 mg Oral Q6H PRN Arman Cushing, MD     • acetaminophen  975 mg Oral Q6H PRN RAMIN ChandraC     • albuterol  2 puff Inhalation Q4H PRN Arman Cushing, MD     • aluminum-magnesium hydroxide-simethicone  30 mL Oral Q4H PRN RAMIN ChandraC     • calcium carbonate  500 mg Oral TID PRN RAMIN ChandraC     • cholecalciferol  1,000 Units Oral QAM Raf Atkinson MD     • cloZAPine  300 mg Oral HS Julio Denise MD     • cyanocobalamin  1,000 mcg Intramuscular Q30 Days Arman Cushing, MD     • Diclofenac Sodium  2 g Topical 4x Daily PRN Payton Conrad PA-C     • fenofibrate  145 mg Oral Daily Arman Cushing, MD     • fluticasone  2 puff Inhalation Q12H [address] Arman Cushing, MD     • guaiFENesin  600 mg Oral Q12H [address] Arman Cushing, MD     • haloperidol lactate  5 mg Intramuscular Q4H PRN Max 4/day Raf Atkinson MD     • hydrocortisone Topical 4x Daily PRN Leanna Velázquez MD     • hydrOXYzine HCL  25 mg Oral Q6H PRN Max 4/day Kareem Kearney MD     • lactobacillus acidophilus-bulgaricus  1 packet Oral BID Leanna Velázquez MD     • lamoTRIgine  100 mg Oral BID JARED Hernandez     • loratadine  10 mg Oral Daily Leanna Velázquez MD     • LORazepam  1 mg Intramuscular Q6H PRN Max 3/day Kareem Kearney MD     • LORazepam  0.5 mg Oral Daily Lexi Warren MD     • LORazepam  0.5 mg Oral Q8H PRN Lexi Warren MD     • LORazepam  1 mg Oral HS Lexi Warren MD     • magnesium hydroxide  30 mL Oral Daily PRN Payton Conrad PA-C     • methocarbamol  500 mg Oral Q6H PRN Payton Conrad PA-C     • nicotine  1 patch Transdermal Daily Leanna Velázquez MD     • nicotine polacrilex  4 mg Oral Q2H PRN Kareem Kearney MD     • nystatin-triamcinolone   Topical BID Leanna Velázquez MD     • risperiDONE  0.25 mg Oral Q4H PRN Max 6/day Kareem Kearney MD     • risperiDONE  0.5 mg Oral Q4H PRN Max 3/day Kareem Kearney MD     • risperiDONE  1 mg Oral After Lunch JARED Carson     • sorbitol  30 mL Oral Q2H PRN Max 3/day Leanna Velázquez MD     • traZODone  50 mg Oral HS PRN Lexi Warren MD     • venlafaxine  150 mg Oral Daily Lexi Warren MD         Risks / Benefits of Treatment:    Risks, benefits, and possible side effects of medications explained to patient and patient verbalizes understanding and agreement for treatment. Counseling / Coordination of Care:    Patient's progress discussed with staff in treatment team meeting. Medications, treatment progress and treatment plan reviewed with patient.     JARED Hernandez 10/08/23

## 2023-10-08 NOTE — NURSING NOTE
Patient appears to have slept thru the night, No acute behaviors observed or concerns voiced. Will CTM. Continuous patient safety checks in progress.

## 2023-10-08 NOTE — NURSING NOTE
Pt claim feeling anxious and having hearing voices. She said "trying to ignore the voices" but want medication. Ativan 0.5 mg po prn offered and taken. To continue Q7 min safety checks and monitor.

## 2023-10-08 NOTE — PROGRESS NOTES
Progress Note - Roopa Erickson 47 y.o. female MRN: [id number]    Unit/Bed#[facility] 209-02 Encounter: [id number]        Subjective:   Patient seen and examined at bedside after reviewing the chart and discussing the case with the caring staff. Patient examined at bedside. Patient complaining of constipation which is poorly controlled with current stool softeners and sorbitol. And is requesting Dulcolax tablets. Patient had vomiting episode this morning as well as yesterday evening. Patient STD panel is pending. Patient is a possible discharge on Wednesday, 10/11/2023. Patient is requesting all her prescriptions. I reviewed and reconciled patient's problem list and medications. Physical Exam   Vitals: Blood pressure 110/74, pulse 96, temperature 97.5 °F (36.4 °C), temperature source Temporal, resp. rate 18, height 5' (1.524 m), weight 67 kg (147 lb 12.8 oz), last menstrual period 01/01/2020, SpO2 100 %, not currently breastfeeding. ,Body mass index is 28.87 kg/m². Constitutional: Patient in no acute distress. HEENT: PERR, EOMI, MMM, neck supple. Cardiovascular: Normal rate and regular rhythm. Pulmonary/Chest: Effort normal and breath sounds normal.   Abdomen: Soft, + BS, NT. Assessment/Plan:  Roopa Erickson is a(n) 47y.o. year old female with schizoaffective disorder.     Medical clearance. Patient is medically cleared for discharge. All scripts will be sent out for the patient. 1. GERD.  Continue Protonix 40 mg daily, Mylanta as needed. 2. Tobacco abuse.  NRT. 3. Hyperlipidemia.  Continue fenofibrate 145 mg daily. 4. Asthma/cough.  Patient is on Flovent twice daily, albuterol inhaler as needed.  She was started on Mucinex twice daily. 5. Allergic rhinitis.  Patient is on Claritin 10 mg daily. 6. Vitamin D deficiency.  Continue vitamin D3 1000 units daily.   7. Vitamin B12 deficiency.   Patient started on monthly B12 injections.   8. Hemorrhoids.  Patient may use Anusol cream as needed. 9. Vulvar rash. Patient may use Mycolog-II ointment twice daily over the affected area. Diflucan 150 mg x 1 dose. I will order STD profile. 10. Chronic low back pain. Tylenol, Robaxin and Voltaren gel as needed. 11. Constipation. We will put the patient on sorbitol 70% every 2 hours x 3 as needed for constipation. Also get Dulcolax tablet as needed daily. Add milk of magnesia as needed.

## 2023-10-08 NOTE — NURSING NOTE
Pt was reassessed by coming to RN and stating she no longer felt agitated and the auditory hallucinations had completely stopped. Pt is relieved and is pacing the hallways but much more calm and she was laughing and smiling and joking with staff. No complaints or concerns reported.

## 2023-10-09 PROBLEM — F41.1 GAD (GENERALIZED ANXIETY DISORDER): Chronic | Status: RESOLVED | Noted: 2017-07-06 | Resolved: 2023-10-09

## 2023-10-09 PROBLEM — Z00.8 MEDICAL CLEARANCE FOR PSYCHIATRIC ADMISSION: Status: RESOLVED | Noted: 2022-12-14 | Resolved: 2023-10-09

## 2023-10-09 PROBLEM — J43.9 EMPHYSEMA LUNG (HCC): Status: RESOLVED | Noted: 2022-06-17 | Resolved: 2023-10-09

## 2023-10-09 PROBLEM — F10.21 H/O ALCOHOL DEPENDENCE (HCC): Status: RESOLVED | Noted: 2022-06-16 | Resolved: 2023-10-09

## 2023-10-09 PROCEDURE — 86592 SYPHILIS TEST NON-TREP QUAL: CPT | Performed by: FAMILY MEDICINE

## 2023-10-09 PROCEDURE — 87340 HEPATITIS B SURFACE AG IA: CPT

## 2023-10-09 PROCEDURE — 86803 HEPATITIS C AB TEST: CPT

## 2023-10-09 PROCEDURE — 87389 HIV-1 AG W/HIV-1&-2 AB AG IA: CPT

## 2023-10-09 PROCEDURE — 99232 SBSQ HOSP IP/OBS MODERATE 35: CPT

## 2023-10-09 PROCEDURE — 87491 CHLMYD TRACH DNA AMP PROBE: CPT

## 2023-10-09 PROCEDURE — 87591 N.GONORRHOEAE DNA AMP PROB: CPT

## 2023-10-09 PROCEDURE — 86706 HEP B SURFACE ANTIBODY: CPT

## 2023-10-09 PROCEDURE — 86704 HEP B CORE ANTIBODY TOTAL: CPT

## 2023-10-09 RX ORDER — CLOZAPINE 100 MG/1
300 TABLET ORAL
Qty: 30 TABLET | Refills: 0 | Status: SHIPPED | OUTPATIENT
Start: 2023-10-09

## 2023-10-09 RX ORDER — BISACODYL 10 MG
10 SUPPOSITORY, RECTAL RECTAL DAILY PRN
Qty: 12 SUPPOSITORY | Refills: 0 | Status: SHIPPED | OUTPATIENT
Start: 2023-10-09

## 2023-10-09 RX ORDER — RISPERIDONE 0.5 MG/1
0.5 TABLET ORAL 2 TIMES DAILY
Qty: 60 TABLET | Refills: 0 | Status: SHIPPED | OUTPATIENT
Start: 2023-10-09

## 2023-10-09 RX ORDER — LAMOTRIGINE 100 MG/1
100 TABLET ORAL 2 TIMES DAILY
Qty: 60 TABLET | Refills: 0 | Status: SHIPPED | OUTPATIENT
Start: 2023-10-09

## 2023-10-09 RX ORDER — BISACODYL 5 MG/1
10 TABLET, DELAYED RELEASE ORAL DAILY PRN
Qty: 30 TABLET | Refills: 0 | Status: SHIPPED | OUTPATIENT
Start: 2023-10-09

## 2023-10-09 RX ORDER — RISPERIDONE 0.5 MG/1
0.5 TABLET ORAL 2 TIMES DAILY
Status: DISCONTINUED | OUTPATIENT
Start: 2023-10-09 | End: 2023-10-10

## 2023-10-09 RX ORDER — LORAZEPAM 0.5 MG/1
0.5 TABLET ORAL 2 TIMES DAILY
Qty: 20 TABLET | Refills: 0 | Status: SHIPPED | OUTPATIENT
Start: 2023-10-09 | End: 2023-10-19

## 2023-10-09 RX ORDER — HYDROXYZINE HYDROCHLORIDE 10 MG/1
10 TABLET, FILM COATED ORAL 3 TIMES DAILY
Status: DISCONTINUED | OUTPATIENT
Start: 2023-10-09 | End: 2023-10-11

## 2023-10-09 RX ORDER — LORAZEPAM 0.5 MG/1
0.5 TABLET ORAL 2 TIMES DAILY
Status: DISCONTINUED | OUTPATIENT
Start: 2023-10-09 | End: 2023-10-11 | Stop reason: HOSPADM

## 2023-10-09 RX ORDER — VENLAFAXINE HYDROCHLORIDE 150 MG/1
150 CAPSULE, EXTENDED RELEASE ORAL DAILY
Qty: 30 CAPSULE | Refills: 0 | Status: SHIPPED | OUTPATIENT
Start: 2023-10-10

## 2023-10-09 RX ORDER — SORBITOL SOLUTION 70 %
30 SOLUTION, ORAL MISCELLANEOUS
Status: DISCONTINUED | OUTPATIENT
Start: 2023-10-09 | End: 2023-10-10

## 2023-10-09 RX ORDER — BISACODYL 10 MG
10 SUPPOSITORY, RECTAL RECTAL DAILY PRN
Status: DISCONTINUED | OUTPATIENT
Start: 2023-10-09 | End: 2023-10-10

## 2023-10-09 RX ORDER — BISACODYL 5 MG/1
10 TABLET, DELAYED RELEASE ORAL DAILY PRN
Status: DISCONTINUED | OUTPATIENT
Start: 2023-10-09 | End: 2023-10-11 | Stop reason: HOSPADM

## 2023-10-09 RX ORDER — HYDROXYZINE HYDROCHLORIDE 10 MG/1
10 TABLET, FILM COATED ORAL 3 TIMES DAILY
Qty: 30 TABLET | Refills: 0 | Status: SHIPPED | OUTPATIENT
Start: 2023-10-09 | End: 2023-10-11

## 2023-10-09 RX ORDER — BISACODYL 5 MG/1
5 TABLET, DELAYED RELEASE ORAL DAILY PRN
Status: DISCONTINUED | OUTPATIENT
Start: 2023-10-09 | End: 2023-10-09

## 2023-10-09 RX ADMIN — NYSTATIN AND TRIAMCINOLONE ACETONIDE: 100000; 1 OINTMENT TOPICAL at 09:00

## 2023-10-09 RX ADMIN — LORAZEPAM 0.5 MG: 0.5 TABLET ORAL at 17:12

## 2023-10-09 RX ADMIN — LORATADINE 10 MG: 10 TABLET ORAL at 09:01

## 2023-10-09 RX ADMIN — NYSTATIN AND TRIAMCINOLONE ACETONIDE 1 APPLICATION: 100000; 1 OINTMENT TOPICAL at 17:13

## 2023-10-09 RX ADMIN — FENOFIBRATE 145 MG: 145 TABLET, FILM COATED ORAL at 09:01

## 2023-10-09 RX ADMIN — NICOTINE 1 PATCH: 21 PATCH, EXTENDED RELEASE TRANSDERMAL at 09:00

## 2023-10-09 RX ADMIN — RISPERIDONE 0.5 MG: 0.5 TABLET ORAL at 17:12

## 2023-10-09 RX ADMIN — CLOZAPINE 300 MG: 100 TABLET ORAL at 21:06

## 2023-10-09 RX ADMIN — GUAIFENESIN 600 MG: 600 TABLET ORAL at 09:01

## 2023-10-09 RX ADMIN — SORBITOL SOLUTION (BULK) 30 ML: 70 SOLUTION at 12:55

## 2023-10-09 RX ADMIN — LAMOTRIGINE 100 MG: 100 TABLET ORAL at 09:01

## 2023-10-09 RX ADMIN — LORAZEPAM 0.5 MG: 0.5 TABLET ORAL at 09:00

## 2023-10-09 RX ADMIN — FLUTICASONE PROPIONATE 2 PUFF: 220 AEROSOL, METERED RESPIRATORY (INHALATION) at 09:01

## 2023-10-09 RX ADMIN — GUAIFENESIN 600 MG: 600 TABLET ORAL at 21:06

## 2023-10-09 RX ADMIN — Medication 1 PACKET: at 09:01

## 2023-10-09 RX ADMIN — CALCIUM CARBONATE (ANTACID) CHEW TAB 500 MG 500 MG: 500 CHEW TAB at 18:33

## 2023-10-09 RX ADMIN — VENLAFAXINE HYDROCHLORIDE 150 MG: 150 CAPSULE, EXTENDED RELEASE ORAL at 09:00

## 2023-10-09 RX ADMIN — FLUTICASONE PROPIONATE 2 PUFF: 220 AEROSOL, METERED RESPIRATORY (INHALATION) at 21:06

## 2023-10-09 RX ADMIN — BISACODYL 5 MG: 5 TABLET, COATED ORAL at 09:54

## 2023-10-09 RX ADMIN — LAMOTRIGINE 100 MG: 100 TABLET ORAL at 14:21

## 2023-10-09 RX ADMIN — Medication 1000 UNITS: at 09:01

## 2023-10-09 NOTE — NURSING NOTE
Patient has been primarily withdrawn to her bedroom today. She is pleasant. Some somatic complaints persist throughout the day. She is c/o constipation- multiple PRN's administered. She continues to report auditory hallucinations throughout the day of mumbled voices. Pt has requested STI screening- she states "people kept touching me". Pt reports that she does believe that she was hallucinating that people were touching her but requests to have STI panel completed to make her feel better. She has been compliant with her scheduled medications. Her appetite is good- 100% of meals. She offers no further complaints/ concerns. Plan of care continues. Q7 minute safety checks in progress.

## 2023-10-09 NOTE — NURSING NOTE
Patient withdrawn to room , upset about medication  changes that were made today. Offered reassurance. Patient is cooperative and pleasant, compliant with medication. Denies SI, HI, endorses auditory hallucinations that are mumbled voices.  Will continue to monitor

## 2023-10-09 NOTE — PROGRESS NOTES
10/09/23    Team Meeting   Meeting Type Daily Rounds   Team Members Present   Team Members Present Physician;;Nurse   Physician Team Member Dr. Jeffry Ron, DO: JARED Becerra   Nursing Team Member Sachin Portillo, YUDY   Care Management Team Member Amelia Colón, MS, [facility], [facility]   Patient/Family Present   Patient Present No   Patient's Family Present No   Will return to [facility] and continue with act services. Denies all. Medical to follow up on GI. Continues with . Prns. Std test ordered. Medication adjustment. D/C Wednesday.

## 2023-10-09 NOTE — NURSING NOTE
Patient been withdrawn to self and isolative to her room. Claimed feeling anxious ativan 0.5 mg and risperidone 0.25 mg po prn given and was effective. Still complained of constipation after taking milk of magnesia. Adviced to increase fluid intake and followed up with bisacodyl 5 mg at bed time. To monitor for bowel movement. Compliant with evening medications. No complaints of S/I,H/I and AH. Positive for evening snacks. Maintain on q7 min safety checks. Will continue to monitor.

## 2023-10-09 NOTE — PLAN OF CARE
Problem: Depression  Goal: Treatment Goal: Demonstrate behavioral control of depressive symptoms, verbalize feelings of improved mood/affect, and adopt new coping skills prior to discharge  Outcome: Progressing  Goal: Refrain from isolation  Description: Interventions:  - Develop a trusting relationship   - Encourage socialization   Outcome: Progressing  Goal: Refrain from self-neglect  Outcome: Progressing     Problem: Anxiety  Goal: Anxiety is at manageable level  Description: Interventions:  - Assess and monitor patient's anxiety level. - Monitor for signs and symptoms (heart palpitations, chest pain, shortness of breath, headaches, nausea, feeling jumpy, restlessness, irritable, apprehensive). - Collaborate with interdisciplinary team and initiate plan and interventions as ordered.   - [place] patient to unit/surroundings  - Explain treatment plan  - Encourage participation in care  - Encourage verbalization of concerns/fears  - Identify coping mechanisms  - Assist in developing anxiety-reducing skills  - Administer/offer alternative therapies  - Limit or eliminate stimulants  Outcome: Progressing
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
## 2023-10-09 NOTE — SOCIAL WORK
CM placed call to [facility]. Spoke with Andrés and notified of PT scheduled discharged for Wednesday. Andrés will relay message to [facility] the director and have her call CM back to review. CM in agreement. Call ended mutually. [phone]. CM placed call to [facility] [phone] to notify of PT scheduled discharge. Left message requesting return call.

## 2023-10-09 NOTE — PLAN OF CARE
Problem: Ineffective Coping  Goal: Participates in unit activities  Description: Interventions:  - Provide therapeutic environment   - Provide required programming   - Redirect inappropriate behaviors   Outcome: Progressing  Reports feeling improved. Intermittent socialization/ interaction. Appropriate during contact. Attended activities briefly.

## 2023-10-09 NOTE — PLAN OF CARE
Problem: Alteration in Thoughts and Perception  Goal: Treatment Goal: Gain control of psychotic behaviors/thinking, reduce/eliminate presenting symptoms and demonstrate improved reality functioning upon discharge  Outcome: Progressing     Problem: Ineffective Coping  Goal: Identifies ineffective coping skills  Outcome: Progressing  Goal: Identifies healthy coping skills  Outcome: Progressing  Goal: Demonstrates healthy coping skills  Outcome: Progressing     Problem: Depression  Goal: Treatment Goal: Demonstrate behavioral control of depressive symptoms, verbalize feelings of improved mood/affect, and adopt new coping skills prior to discharge  Outcome: Progressing     Problem: Electroconvulsive therapy (ECT)  Goal: Treatment Goal: Demonstrate a reduction of confusion and improved orientation to person, place, time and/or situation upon discharge, according to optimum baseline/ability  Outcome: Progressing  Goal: Verbalizes/displays adequate comfort level or baseline comfort level  Description: Interventions:  - Encourage patient to monitor pain and request assistance  - Assess pain using appropriate pain scale  - Administer analgesics based on type and severity of pain and evaluate response  - Implement non-pharmacological measures as appropriate and evaluate response  - Consider cultural and social influences on pain and pain management  - Notify physician/advanced practitioner if interventions unsuccessful or patient reports new pain  Outcome: Progressing  Goal: Achieves stable or improved neurological status  Description: INTERVENTIONS  - Monitor and report changes in neurological status  - Monitor vital signs such as temperature, blood pressure, glucose, and any other labs ordered   - Initiate measures to prevent increased intracranial pressure  - Monitor for seizure activity and implement precautions if appropriate      Outcome: Progressing  Goal: Achieves maximal functionality and self care  Description: INTERVENTIONS  - Monitor swallowing and airway patency with patient fatigue and changes in neurological status  - Encourage and assist patient to increase activity and self care     - Encourage visually impaired, hearing impaired and aphasic patients to use assistive/communication devices  Outcome: Progressing  Goal: Maintain or return mobility to safest level of function  Description: INTERVENTIONS:  - Assess patient's ability to carry out ADLs; assess patient's baseline for ADL function and identify physical deficits which impact ability to perform ADLs (bathing, care of mouth/teeth, toileting, grooming, dressing, etc )  - Assess/evaluate cause of self-care deficits   - Assess range of motion  - Assess patient's mobility  - Assess patient's need for assistive devices and provide as appropriate  - Encourage maximum independence but intervene and supervise when necessary  - Involve family in performance of ADLs  - Assess for home care needs following discharge   - Consider OT consult to assist with ADL evaluation and planning for discharge  - Provide patient education as appropriate  Outcome: Progressing  Goal: Absence of urinary retention  Description: INTERVENTIONS:  - Assess patient’s ability to void and empty bladder  - Monitor I/O  - Bladder scan as needed  - Discuss with physician/AP medications to alleviate retention as needed  - Discuss catheterization for long term situations as appropriate  Outcome: Progressing  Goal: Minimal or absence of nausea and/or vomiting  Description: INTERVENTIONS:  - Administer IV fluids if ordered to ensure adequate hydration  - Maintain NPO status until nausea and vomiting are resolved  - Nasogastric tube if ordered  - Administer ordered antiemetic medications as needed  - Provide nonpharmacologic comfort measures as appropriate  - Advance diet as tolerated, if ordered  - Consider nutrition services referral to assist patient with adequate nutrition and appropriate food choices  Outcome: Progressing  Goal: Maintains adequate nutritional intake  Description: INTERVENTIONS:  - Monitor percentage of each meal consumed  - Identify factors contributing to decreased intake, treat as appropriate  - Assist with meals as needed  - Monitor I&O, weight, and lab values if indicated  - Obtain nutrition services referral as needed  Outcome: Progressing sedated MRI

## 2023-10-09 NOTE — SOCIAL WORK
CM met with PT for PT check in. PT was pleasant in conversation, feels that she is ready for discharge this Wednesday back to group home and continue with act services. PT denies si/hi/vh, reported minimal ah controlled. Reassurance provided. PT was pleasant in conversation.

## 2023-10-09 NOTE — PROGRESS NOTES
Progress Note - Shayla Brown 47 y.o. female MRN: [id number]    Unit/Bed#[facility] 209-02 Encounter: [id number]        Subjective:   Patient seen and examined at bedside after reviewing the chart and discussing the case with the caring staff. Patient examined at bedside. Patient complaining of constipation which is poorly controlled with current stool softeners and sorbitol. Patient is requesting Dulcolax tablets along with suppositories. Patient STD panel is pending. Patient is a possible discharge on Wednesday, 10/11/2023. Patient is requesting all her prescriptions. I reviewed and reconciled patient's problem list and medications. Physical Exam   Vitals: Blood pressure 102/56, pulse 78, temperature 97.9 °F (36.6 °C), temperature source Temporal, resp. rate 18, height 5' (1.524 m), weight 67 kg (147 lb 12.8 oz), last menstrual period 01/01/2020, SpO2 92 %, not currently breastfeeding. ,Body mass index is 28.87 kg/m². Constitutional: Patient in no acute distress. HEENT: PERR, EOMI, MMM, neck supple. Cardiovascular: Normal rate and regular rhythm. Pulmonary/Chest: Effort normal and breath sounds normal.   Abdomen: Soft, + BS, NT. Assessment/Plan:  Shayla Brown is a(n) 47y.o. year old female with schizoaffective disorder.     Medical clearance. Patient is medically cleared for discharge. All scripts will be sent out for the patient. 1. GERD.  Continue Protonix 40 mg daily, Mylanta as needed. 2. Tobacco abuse.  NRT. 3. Hyperlipidemia.  Continue fenofibrate 145 mg daily. 4. Asthma/cough.  Patient is on Flovent twice daily, albuterol inhaler as needed.  She was started on Mucinex twice daily. 5. Allergic rhinitis.  Patient is on Claritin 10 mg daily. 6. Vitamin D deficiency.  Continue vitamin D3 1000 units daily.   7. Vitamin B12 deficiency. Patient started on monthly B12 injections.   8. Hemorrhoids.  Patient may use Anusol cream as needed. 9. Vulvar rash.   Patient may use Mycolog-II ointment twice daily over the affected area. Diflucan 150 mg x 1 dose. I will order STD profile. 10. Chronic low back pain. Tylenol, Robaxin and Voltaren gel as needed. 11. Constipation. We will put the patient on sorbitol 70% every 2 hours x 3 as needed for constipation. Also get Dulcolax tablet as needed daily. Add milk of magnesia as needed.

## 2023-10-10 LAB
ATRIAL RATE: 105 BPM
BASOPHILS # BLD AUTO: 0.04 THOUSANDS/ÂΜL (ref 0–0.1)
BASOPHILS NFR BLD AUTO: 0 % (ref 0–1)
EOSINOPHIL # BLD AUTO: 0.37 THOUSAND/ÂΜL (ref 0–0.61)
EOSINOPHIL NFR BLD AUTO: 4 % (ref 0–6)
ERYTHROCYTE [DISTWIDTH] IN BLOOD BY AUTOMATED COUNT: 14.5 % (ref 11.6–15.1)
HCT VFR BLD AUTO: 43 % (ref 34.8–46.1)
HGB BLD-MCNC: 13.5 G/DL (ref 11.5–15.4)
IMM GRANULOCYTES # BLD AUTO: 0.11 THOUSAND/UL (ref 0–0.2)
IMM GRANULOCYTES NFR BLD AUTO: 1 % (ref 0–2)
LYMPHOCYTES # BLD AUTO: 2.03 THOUSANDS/ÂΜL (ref 0.6–4.47)
LYMPHOCYTES NFR BLD AUTO: 21 % (ref 14–44)
MCH RBC QN AUTO: 27.8 PG (ref 26.8–34.3)
MCHC RBC AUTO-ENTMCNC: 31.4 G/DL (ref 31.4–37.4)
MCV RBC AUTO: 89 FL (ref 82–98)
MONOCYTES # BLD AUTO: 0.7 THOUSAND/ÂΜL (ref 0.17–1.22)
MONOCYTES NFR BLD AUTO: 7 % (ref 4–12)
NEUTROPHILS # BLD AUTO: 6.58 THOUSANDS/ÂΜL (ref 1.85–7.62)
NEUTS SEG NFR BLD AUTO: 67 % (ref 43–75)
NRBC BLD AUTO-RTO: 0 /100 WBCS
P AXIS: 63 DEGREES
PLATELET # BLD AUTO: 329 THOUSANDS/UL (ref 149–390)
PMV BLD AUTO: 9.2 FL (ref 8.9–12.7)
PR INTERVAL: 166 MS
QRS AXIS: -2 DEGREES
QRSD INTERVAL: 68 MS
QT INTERVAL: 348 MS
QTC INTERVAL: 459 MS
RBC # BLD AUTO: 4.85 MILLION/UL (ref 3.81–5.12)
T WAVE AXIS: 55 DEGREES
VENTRICULAR RATE: 105 BPM
WBC # BLD AUTO: 9.83 THOUSAND/UL (ref 4.31–10.16)

## 2023-10-10 PROCEDURE — 93005 ELECTROCARDIOGRAM TRACING: CPT

## 2023-10-10 PROCEDURE — 99232 SBSQ HOSP IP/OBS MODERATE 35: CPT

## 2023-10-10 PROCEDURE — 85025 COMPLETE CBC W/AUTO DIFF WBC: CPT

## 2023-10-10 PROCEDURE — 93010 ELECTROCARDIOGRAM REPORT: CPT | Performed by: INTERNAL MEDICINE

## 2023-10-10 RX ORDER — RISPERIDONE 0.5 MG/1
0.5 TABLET ORAL
Status: DISCONTINUED | OUTPATIENT
Start: 2023-10-10 | End: 2023-10-11 | Stop reason: HOSPADM

## 2023-10-10 RX ORDER — LORAZEPAM 1 MG/1
1 TABLET ORAL
Status: DISCONTINUED | OUTPATIENT
Start: 2023-10-10 | End: 2023-10-11 | Stop reason: HOSPADM

## 2023-10-10 RX ORDER — BISACODYL 10 MG
10 SUPPOSITORY, RECTAL RECTAL DAILY PRN
Status: DISCONTINUED | OUTPATIENT
Start: 2023-10-10 | End: 2023-10-11 | Stop reason: HOSPADM

## 2023-10-10 RX ORDER — SORBITOL SOLUTION 70 %
30 SOLUTION, ORAL MISCELLANEOUS
Status: DISCONTINUED | OUTPATIENT
Start: 2023-10-10 | End: 2023-10-11 | Stop reason: HOSPADM

## 2023-10-10 RX ADMIN — RISPERIDONE 0.5 MG: 0.5 TABLET ORAL at 09:09

## 2023-10-10 RX ADMIN — LAMOTRIGINE 100 MG: 100 TABLET ORAL at 09:10

## 2023-10-10 RX ADMIN — RISPERIDONE 0.5 MG: 0.5 TABLET ORAL at 21:19

## 2023-10-10 RX ADMIN — NYSTATIN AND TRIAMCINOLONE ACETONIDE: 100000; 1 OINTMENT TOPICAL at 17:37

## 2023-10-10 RX ADMIN — GUAIFENESIN 600 MG: 600 TABLET ORAL at 09:09

## 2023-10-10 RX ADMIN — LORATADINE 10 MG: 10 TABLET ORAL at 09:09

## 2023-10-10 RX ADMIN — LORAZEPAM 1 MG: 1 TABLET ORAL at 21:18

## 2023-10-10 RX ADMIN — CLOZAPINE 300 MG: 100 TABLET ORAL at 21:19

## 2023-10-10 RX ADMIN — CALCIUM CARBONATE (ANTACID) CHEW TAB 500 MG 500 MG: 500 CHEW TAB at 10:34

## 2023-10-10 RX ADMIN — LORAZEPAM 0.5 MG: 0.5 TABLET ORAL at 09:09

## 2023-10-10 RX ADMIN — FLUTICASONE PROPIONATE 2 PUFF: 220 AEROSOL, METERED RESPIRATORY (INHALATION) at 21:19

## 2023-10-10 RX ADMIN — Medication 1000 UNITS: at 09:09

## 2023-10-10 RX ADMIN — FLUTICASONE PROPIONATE 2 PUFF: 220 AEROSOL, METERED RESPIRATORY (INHALATION) at 09:13

## 2023-10-10 RX ADMIN — LORAZEPAM 0.5 MG: 0.5 TABLET ORAL at 17:37

## 2023-10-10 RX ADMIN — VENLAFAXINE HYDROCHLORIDE 150 MG: 150 CAPSULE, EXTENDED RELEASE ORAL at 09:09

## 2023-10-10 RX ADMIN — FENOFIBRATE 145 MG: 145 TABLET, FILM COATED ORAL at 09:09

## 2023-10-10 RX ADMIN — NICOTINE 1 PATCH: 21 PATCH, EXTENDED RELEASE TRANSDERMAL at 09:10

## 2023-10-10 RX ADMIN — CALCIUM CARBONATE (ANTACID) CHEW TAB 500 MG 500 MG: 500 CHEW TAB at 15:42

## 2023-10-10 RX ADMIN — LAMOTRIGINE 100 MG: 100 TABLET ORAL at 14:32

## 2023-10-10 RX ADMIN — METHOCARBAMOL 500 MG: 500 TABLET ORAL at 05:46

## 2023-10-10 RX ADMIN — RISPERIDONE 0.5 MG: 0.5 TABLET ORAL at 14:33

## 2023-10-10 RX ADMIN — Medication 1 PACKET: at 21:18

## 2023-10-10 RX ADMIN — GUAIFENESIN 600 MG: 600 TABLET ORAL at 21:19

## 2023-10-10 NOTE — SOCIAL WORK
CM met with PT for PT check in. Reviewed discharge plan along with follow up care and supports. PT in agreement with all. PT denies si/hi/vh, minimal AH, minimal anxiety and no depression. CM reviewed that she is awaiting follow up with ACT team to arrange for transport for tomorrow, PT in agreement.

## 2023-10-10 NOTE — PROGRESS NOTES
Progress Note - Behavioral Health   Eloise Gonzalez 47 y.o. female MRN: [id number]  Unit/Bed#: [facility] 209-02 Encounter: [id number]    Assessment/Plan   Principal Problem:    Schizoaffective disorder, bipolar type ([address])  Active Problems:    Degenerative disc disease, lumbar    Hyperlipidemia    Post-traumatic stress disorder, chronic    Gastroesophageal reflux disease    Hoarseness    Other headache syndrome    Dependence on nicotine from cigarettes      Behavior over the last 24 hours: some Improvement  Sleep: normal  Appetite: normal  Medication side effects: No  ROS: no complaints and all other systems are negative    Subjective: Adrianna was seen today for psychiatric follow-up. Patient calm, cooperative. She is visible on the unit social with peers. Patient irritable and medication focused at times. According to nursing staff patient compliant with her medication regimen and unit rules. She denied any sleep disturbance, SI/HI/AVh. She did not appear internally preoccupied. Mental Status Evaluation:   Appearance:  age appropriate and casually dressed   Behavior:  calm, cooperative   Speech:  normal pitch and normal volume   Mood:  improving   Affect:  mood-congruent   Thought Process:  goal directed   Associations: intact associations   Thought Content:  no overt delusions   Perceptual Disturbances: denied AVH, did not appear internally preoccupied   Risk Potential: Suicidal Ideations none at present   Homicidal Ideations none at present  Potential for Aggression No   Sensorium:  person, place and time/date   Memory:  recent and remote memory grossly intact   Consciousness:  alert and awake    Attention: attention span and concentration were age appropriate   Insight:  fair   Judgment: fair   Gait/Station: normal gait/station   Motor Activity: no abnormal movements     Progress Toward Goals: Unchanged. Patient compliant with the current psychotropic medication regimen.   She denies side effects from current psychotropic medication regimen. Will continue current psychotropic medication regimen. Unsedated discharge on 10/11/2023. Recommended Treatment: Continue with group therapy, milieu therapy and occupational therapy. Risks, benefits and possible side effects of Medications:   Risks, benefits, and possible side effects of medications explained to patient and patient verbalizes understanding.       Medications:   all current active meds have been reviewed and current meds:   Current Facility-Administered Medications   Medication Dose Route Frequency   • acetaminophen (TYLENOL) tablet 650 mg  650 mg Oral Q6H PRN   • acetaminophen (TYLENOL) tablet 975 mg  975 mg Oral Q6H PRN   • albuterol (PROVENTIL HFA,VENTOLIN HFA) inhaler 2 puff  2 puff Inhalation Q4H PRN   • aluminum-magnesium hydroxide-simethicone (MAALOX) oral suspension 30 mL  30 mL Oral Q4H PRN   • bisacodyl (DULCOLAX) EC tablet 10 mg  10 mg Oral Daily PRN   • bisacodyl (DULCOLAX) rectal suppository 10 mg  10 mg Rectal Daily PRN   • calcium carbonate (TUMS) chewable tablet 500 mg  500 mg Oral TID PRN   • cholecalciferol (VITAMIN D3) tablet 1,000 Units  1,000 Units Oral QAM   • cloZAPine (CLOZARIL) tablet 300 mg  300 mg Oral HS   • cyanocobalamin injection 1,000 mcg  1,000 mcg Intramuscular Q30 Days   • Diclofenac Sodium (VOLTAREN) 1 % topical gel 2 g  2 g Topical 4x Daily PRN   • fenofibrate (TRICOR) tablet 145 mg  145 mg Oral Daily   • fluticasone (FLOVENT HFA) 220 mcg/act inhaler 2 puff  2 puff Inhalation Q12H KENNY   • guaiFENesin (MUCINEX) 12 hr tablet 600 mg  600 mg Oral Q12H KENNY   • haloperidol lactate (HALDOL) injection 5 mg  5 mg Intramuscular Q4H PRN Max 4/day   • hydrocortisone (ANUSOL-HC) 2.5 % rectal cream   Topical 4x Daily PRN   • hydrOXYzine HCL (ATARAX) tablet 10 mg  10 mg Oral TID   • lactobacillus acidophilus-bulgaricus (FLORANEX) packet 1 packet  1 packet Oral BID   • lamoTRIgine (LaMICtal) tablet 100 mg  100 mg Oral BID   • loratadine (CLARITIN) tablet 10 mg  10 mg Oral Daily   • LORazepam (ATIVAN) injection 1 mg  1 mg Intramuscular Q6H PRN Max 3/day   • LORazepam (ATIVAN) tablet 0.5 mg  0.5 mg Oral BID   • magnesium hydroxide (MILK OF MAGNESIA) oral suspension 30 mL  30 mL Oral Daily PRN   • methocarbamol (ROBAXIN) tablet 500 mg  500 mg Oral Q6H PRN   • nicotine (NICODERM CQ) 21 mg/24 hr TD 24 hr patch 1 patch  1 patch Transdermal Daily   • nicotine polacrilex (NICORETTE) gum 4 mg  4 mg Oral Q2H PRN   • nystatin-triamcinolone (MYCOLOG-II) ointment   Topical BID   • risperiDONE (RisperDAL) tablet 0.5 mg  0.5 mg Oral BID   • sorbitol 70 % solution 30 mL  30 mL Oral Q2H PRN Max 3/day   • traZODone (DESYREL) tablet 50 mg  50 mg Oral HS PRN   • venlafaxine (EFFEXOR-XR) 24 hr capsule 150 mg  150 mg Oral Daily   . Labs: I have personally reviewed all pertinent laboratory/tests results. Most Recent Labs:   Lab Results   Component Value Date    WBC 9.83 10/10/2023    RBC 4.85 10/10/2023    HGB 13.5 10/10/2023    HCT 43.0 10/10/2023     10/10/2023    RDW 14.5 10/10/2023    NEUTROABS 6.58 10/10/2023    SODIUM 137 10/02/2023    K 4.1 10/02/2023     10/02/2023    CO2 27 10/02/2023    BUN 7 10/02/2023    CREATININE 0.54 (L) 10/02/2023    GLUC 101 10/02/2023    GLUF 101 (H) 10/02/2023    CALCIUM 8.8 10/02/2023    AST 14 10/01/2023    ALT 14 10/01/2023    ALKPHOS 80 10/01/2023    TP 7.1 10/01/2023    ALB 4.1 10/01/2023    TBILI 0.23 10/01/2023    CHOLESTEROL 166 10/02/2023    HDL 53 10/02/2023    TRIG 137 10/02/2023    LDLCALC 86 10/02/2023    NONHDLC 113 10/02/2023    AMMONIA 28 10/27/2017    TDQ9CKGBDCAR 2.345 10/01/2023    FREET4 0.89 03/24/2016    PREGSERUM Negative 10/21/2019    HCG <2.00 04/10/2014    RPR Non-Reactive 12/07/2022    HGBA1C 5.6 12/02/2022     12/02/2022       Counseling / Coordination of Care  Total floor / unit time spent today 25 minutes.

## 2023-10-10 NOTE — DISCHARGE SUMMARY
Discharge Summary - [address] Jesika Lamb 47 y.o. female MRN: [id number]  Unit/Bed#: [facility] 209-02 Encounter: [id number]     Admission Date: 10/2/2023         Discharge Date: 10/11/2023    Attending Psychiatrist: Danielle Branham MD    Reason for Admission/HPI: Suicidal ideations [R45.851]  Depression [F32. A]   According to H&P of     Patient is a 47 y.o. female presented with a history of Schizoaffective Disorder, Generalized Anxiety Disorder and PTSD who was admitted to the inpatient older adult psychiatric unit on a voluntary 201 commitment basis due to depression, anxiety, unstable mood, auditory hallucinations and suicidal ideation. Patient was brought to ED via EMS from her group home due to increased worsening depressive symptoms, auditory hallucinations with suicidal ideation. She believed someone gave her wrong meds and other drug which caused her feeling very "anxious and unstable". EKG with QTC interval with prolongation and sinus tachycardia. UDS was negative. On evaluation in the inpatient psychiatric unit Sarah Ferraro prevaents anxious, tearful but able to be engaged and answer question appropriately. Patients claims she has been feeling very depressed, anxious for the past week and believes that somebody was giving her wrong  Medications and alcohol. She has been feeling increasingly depressed, hopeless, helpless, not safe with increased suicidal ideation by overdose on meds. Denies any current active plan or intent to hurt herself and she is able to contract for safety presently. Patient also admits worsening of AH which made her feeling scared at night. States she was taking Clozaril, Lamictal, Effexor, Haldol and Ativan prescribed during her last hospitalization and continued at her group home. Denies any recent manic episode, homicidal ideation, delusion, alcohol or illicit drug.  Pt agreed to be compliant with medication, resume her Clozaril, Lamictal and Ativan at lower doses and treatment plan.    Hospital Course: The patient was admitted to the inpatient psychiatric unit and started on every 7 minutes precautions. During the hospitalization the patient was attending individual therapy, group therapy, milieu therapy and occupational therapy. Psychiatric medications were titrated over the hospital stay. To address depressive symptoms, mood instability, anxiety symptoms and insomnia the patient was started on antidepressant Effexor XR, mood stabilizer Lamictal, antipsychotic medication Risperdal and Clozaril and anxiolytic medication Ativan. Medication doses were titrated during the hospital course. Prior to beginning of treatment medications risks and benefits and possible side effects including risk of rash related to treatment with Lamictal, risk of parkinsonian symptoms, Tardive Dyskinesia and metabolic syndrome related to treatment with antipsychotic medications, risk of cardiovascular events in elderly related to treatment with antipsychotic medications, risk of suicidality and serotonin syndrome related to treatment with antidepressants and risks of misuse, abuse or dependence, sedation and respiratory depression related to treatment with benzodiazepine medications were reviewed with the patient. The patient verbalized understanding and agreement for treatment. Patient's symptoms improved gradually over the hospital course. At the end of treatment the patient was doing well. Mood was stable at the time of discharge. The patient denied suicidal ideation, intent or plan at the time of discharge and denied homicidal ideation, intent or plan at the time of discharge. There was no overt psychosis at the time of discharge. Sleep and appetite were improved. The patient was tolerating medications and was not reporting any significant side effects at the time of discharge.     Since the patient was doing well at the end of the hospitalization, treatment team felt that the patient could be safely discharged to outpatient care. The outpatient follow up with  [facility] ACT team  was arranged by the unit  upon discharge. Mental Status at time of Discharge:     Appearance:  age appropriate and casually dressed   Behavior:  cooperative   Speech:  normal pitch and normal volume   Mood:  euthymic   Affect:  mood-congruent   Thought Process:  goal directed   Thought Content:  no overt delusions   Perceptual Disturbances: denied AVH, did not appear internally preoccupied   Risk Potential: none   Sensorium:  person, place and time/date   Cognition:  recent and remote memory grossly intact   Consciousness:  alert and awake    Attention: attention span and concentration were age appropriate   Insight:  good   Judgment: good   Gait/Station: normal gait/station   Motor Activity: no abnormal movements     Admission Diagnosis:Suicidal ideations [R45.851]  Depression [F32. A]    Discharge Diagnosis:   Principal Problem:    Schizoaffective disorder, bipolar type ([address])  Active Problems:    Degenerative disc disease, lumbar    Hyperlipidemia    Post-traumatic stress disorder, chronic    Gastroesophageal reflux disease    Hoarseness    Other headache syndrome    Dependence on nicotine from cigarettes  Resolved Problems:    LYNN (generalized anxiety disorder)    H/O alcohol dependence ([address])        Lab results:  Admission on 10/02/2023   Component Date Value    Vitamin B-12 10/02/2023 245     Folate 10/02/2023 8.7     Vit D, 25-Hydroxy 10/02/2023 52.6     Sodium 10/02/2023 137     Potassium 10/02/2023 4.1     Chloride 10/02/2023 102     CO2 10/02/2023 27     ANION GAP 10/02/2023 8     BUN 10/02/2023 7     Creatinine 10/02/2023 0.54 (L)     Glucose 10/02/2023 101     Glucose, Fasting 10/02/2023 101 (H)     Calcium 10/02/2023 8.8     eGFR 10/02/2023 108     WBC 10/02/2023 7.12     RBC 10/02/2023 4.49     Hemoglobin 10/02/2023 12.7     Hematocrit 10/02/2023 40.3     MCV 10/02/2023 90     MCH 10/02/2023 28.3     MCHC 10/02/2023 31.5     RDW 10/02/2023 15.0     MPV 10/02/2023 9.0     Platelets 00/38/7318 342     nRBC 10/02/2023 0     Neutrophils Relative 10/02/2023 66     Immat GRANS % 10/02/2023 1     Lymphocytes Relative 10/02/2023 22     Monocytes Relative 10/02/2023 6     Eosinophils Relative 10/02/2023 5     Basophils Relative 10/02/2023 0     Neutrophils Absolute 10/02/2023 4.81     Immature Grans Absolute 10/02/2023 0.04     Lymphocytes Absolute 10/02/2023 1.53     Monocytes Absolute 10/02/2023 0.40     Eosinophils Absolute 10/02/2023 0.32     Basophils Absolute 10/02/2023 0.02     Cholesterol 10/02/2023 166     Triglycerides 10/02/2023 137     HDL, Direct 10/02/2023 53     LDL Calculated 10/02/2023 86     Non-HDL-Chol (CHOL-HDL) 10/02/2023 113     POC Glucose 10/03/2023 111     WBC 10/10/2023 9.83     RBC 10/10/2023 4.85     Hemoglobin 10/10/2023 13.5     Hematocrit 10/10/2023 43.0     MCV 10/10/2023 89     MCH 10/10/2023 27.8     MCHC 10/10/2023 31.4     RDW 10/10/2023 14.5     MPV 10/10/2023 9.2     Platelets 07/71/3598 329     nRBC 10/10/2023 0     Neutrophils Relative 10/10/2023 67     Immat GRANS % 10/10/2023 1     Lymphocytes Relative 10/10/2023 21     Monocytes Relative 10/10/2023 7     Eosinophils Relative 10/10/2023 4     Basophils Relative 10/10/2023 0     Neutrophils Absolute 10/10/2023 6.58     Immature Grans Absolute 10/10/2023 0.11     Lymphocytes Absolute 10/10/2023 2.03     Monocytes Absolute 10/10/2023 0.70     Eosinophils Absolute 10/10/2023 0.37     Basophils Absolute 10/10/2023 0.04        Discharge Medications:  Current Discharge Medication List        START taking these medications    Details   bisacodyl (DULCOLAX) 10 mg suppository Insert 1 suppository (10 mg total) into the rectum daily as needed for constipation  Qty: 12 suppository, Refills: 0    Associated Diagnoses: Constipation       bisacodyl (DULCOLAX) 5 mg EC tablet Take 1 tablet (5 mg total) by mouth daily as needed for constipation  Qty: 30 tablet, Refills: 0    Associated Diagnoses: Constipation                 calcium carbonate (TUMS) 500 mg chewable tablet Chew 1 tablet (500 mg total) 3 (three) times a day as needed for heartburn  Qty: 60 tablet, Refills: 0    Associated Diagnoses: Gastroesophageal reflux disease without esophagitis      cyanocobalamin 1,000 mcg/mL Inject 1 mL (1,000 mcg total) into a muscle every 30 (thirty) days Do not start before November 2, 2023. Qty: 1 mL, Refills: 0    Associated Diagnoses: Vitamin B12 deficiency      Diclofenac Sodium (VOLTAREN) 1 % Apply 2 g topically 4 (four) times a day as needed (pain)  Qty: 350 g, Refills: 0    Associated Diagnoses: Lumbago      guaiFENesin (MUCINEX) 600 mg 12 hr tablet Take 1 tablet (600 mg total) by mouth every 12 (twelve) hours  Qty: 20 tablet, Refills: 0    Associated Diagnoses: URI with cough and congestion                 lactobacillus acidophilus-bulgaricus (FLORANEX) packet Take 1 packet by mouth 2 (two) times a day  Qty: 60 each, Refills: 0    Associated Diagnoses: Constipation       LORazepam (ATIVAN) 0.5 mg tablet Take 1 tablet (0.5 mg total) by mouth 2 (two) times a day for 10 days  Qty: 20 tablet, Refills: 0    Associated Diagnoses: LYNN (generalized anxiety disorder)      magnesium hydroxide (MILK OF MAGNESIA) 400 mg/5 mL oral suspension Take 30 mL by mouth daily as needed for constipation  Qty: 769 mL, Refills: 0    Associated Diagnoses: Constipation      nicotine (NICODERM CQ) 21 mg/24 hr TD 24 hr patch Place 1 patch on the skin over 24 hours daily Do not start before October 9, 2023.   Qty: 28 patch, Refills: 0    Associated Diagnoses: Tobacco abuse      risperiDONE (RisperDAL) 0.5 mg tablet Take 1 tablet (0.5 mg total) by mouth 2 (two) times a day  Qty: 60 tablet, Refills: 0    Associated Diagnoses: Schizoaffective disorder, bipolar type ([address])                 Current Discharge Medication List        STOP taking these medications       haloperidol (HALDOL) 5 mg tablet Comments:   Reason for Stopping:         benzonatate (TESSALON PERLES) 100 mg capsule Comments:   Reason for Stopping:         doxycycline hyclate (VIBRA-TABS) 100 mg tablet Comments:   Reason for Stopping:         fluticasone (FLONASE) 50 mcg/act nasal spray Comments:   Reason for Stopping:         haloperidol (HALDOL) 1 mg tablet Comments:   Reason for Stopping:         hydrocortisone 2.5 % cream Comments:   Reason for Stopping:         lidocaine (LIDODERM) 5 % Comments:   Reason for Stopping:         melatonin 3 mg Comments:   Reason for Stopping:         pantoprazole (PROTONIX) 40 mg tablet Comments:   Reason for Stopping:         polyethylene glycol (GLYCOLAX) 17 GM/SCOOP Comments:   Reason for Stopping:         sorbitol 70 % Comments:   Reason for Stopping:                Current Discharge Medication List        CONTINUE these medications which have CHANGED    Details   albuterol (PROVENTIL HFA,VENTOLIN HFA) 90 mcg/act inhaler Inhale 2 puffs every 4 (four) hours as needed for wheezing  Qty: 18 g, Refills: 0    Comments: Substitution to a formulary equivalent within the same pharmaceutical class is authorized. Associated Diagnoses: Mild intermittent asthmatic bronchitis with acute exacerbation      aluminum-magnesium hydroxide-simethicone (MAALOX) 9750-7827-096 mg/30 mL suspension Take 30 mL by mouth every 4 (four) hours as needed for indigestion or heartburn (dyspepsia)  Qty: 769 mL, Refills: 0    Associated Diagnoses: Constipation      cetirizine (ZyrTEC) 10 mg tablet Take 1 tablet (10 mg total) by mouth daily  Qty: 30 tablet, Refills: 0    Associated Diagnoses:  Allergy, sequela      cholecalciferol (VITAMIN D3) 1,000 units tablet Take 1 tablet (1,000 Units total) by mouth every morning Dx: Supplement  Qty: 30 tablet, Refills: 0    Associated Diagnoses: Vitamin D deficiency      cloZAPine (CLOZARIL) 100 mg tablet Take 3 tablets (300 mg total) by mouth daily at bedtime  Qty: 30 tablet, Refills: 0 Associated Diagnoses: Schizoaffective disorder, bipolar type ([facility])      fenofibrate (TRICOR) 145 mg tablet Take 1 tablet (145 mg total) by mouth daily  Qty: 28 tablet, Refills: 0    Associated Diagnoses: Hyperlipidemia      fluticasone (Flovent Diskus) 50 mcg/actuation diskus inhaler Inhale 1 puff 2 (two) times a day Rinse mouth after use. Qty: 60 blister, Refills: 0    Associated Diagnoses: Mild persistent asthma without complication      Hydrocortisone, Perianal, (Proctocort) 1 % CREA Apply 1 application. topically 4 (four) times a day as needed (hemorroids)  Qty: 30 g, Refills: 0    Associated Diagnoses: Hemorrhoids, unspecified hemorrhoid type      lamoTRIgine (LaMICtal) 100 mg tablet Take 1 tablet (100 mg total) by mouth 2 (two) times a day  Qty: 60 tablet, Refills: 0    Associated Diagnoses: Schizoaffective disorder, bipolar type ([facility])      methocarbamol (ROBAXIN) 500 mg tablet Take 1 tablet (500 mg total) by mouth every 6 (six) hours as needed for muscle spasms  Qty: 90 tablet, Refills: 0    Associated Diagnoses: Muscle spasm      nicotine polacrilex (NICORETTE) 4 mg gum Chew 1 each (4 mg total) as needed for smoking cessation  Qty: 100 each, Refills: 0    Associated Diagnoses: Tobacco abuse      nystatin-triamcinolone (MYCOLOG-II) ointment Apply topically 2 (two) times a day 2 times daily or as needed for itching  Qty: 60 g, Refills: 0    Associated Diagnoses: Vulvar rash      venlafaxine (EFFEXOR-XR) 150 mg 24 hr capsule Take 1 capsule (150 mg total) by mouth daily Do not start before October 10, 2023.   Qty: 30 capsule, Refills: 0    Associated Diagnoses: LYNN (generalized anxiety disorder)              Current Discharge Medication List        CONTINUE these medications which have NOT CHANGED    Details   docusate sodium (COLACE) 100 mg capsule 1 CAP ORALLY TWICE DAILY DX: CONSTIPATION  Qty: 56 capsule, Refills: 4    Associated Diagnoses: Constipation       LORazepam (ATIVAN) 1 mg tablet Take 1/2 tablet (0.5 mg) every morning and 1 tablet (1 mg) every evening. Qty: 45 tablet, Refills: 0    Associated Diagnoses: Schizoaffective disorder, bipolar type ([facility])      albuterol (2.5 mg/3 mL) 0.083 % nebulizer solution 1 VIAL VIA NEB EVERY 6 HOURS AS NEEDED FOR WHEEZING OR SHORTNESS OF BREATH  Qty: 180 mL, Refills: 4    Associated Diagnoses: Mild intermittent asthmatic bronchitis with acute exacerbation      Incontinence Supply Disposable (Depend Underwear Sm/Med) MISC Use 3 (three) times a day as needed (incontinence)  Qty: 138 each, Refills: 7    Associated Diagnoses: Mixed stress and urge urinary incontinence                 Discharge instructions/Information to patient and family:   See after visit summary for information provided to patient and family. Provisions for Follow-Up Care:  See after visit summary for information related to follow-up care and any pertinent home health orders. Discharge Statement     I spent 40 minutes discharging the patient. This time was spent on the day of discharge. I had direct contact with the patient on the day of discharge. Additional documentation is required if more than 30 minutes were spent on discharge:    I reviewed with Elicia Villar importance of compliance with medications and outpatient treatment after discharge. I discussed the medication regimen and possible side effects of the medications with Elicia Villar prior to discharge. At the time of discharge she was tolerating psychiatric medications. I discussed outpatient follow up with Elicia Villar  I reviewed with Elicia Villar crisis plan and safety plan upon discharge. Outpatient Smoking Cessation referral was offered to Elicia Villar. She declined the referral.  Elicia Villar is being discharged on 2 antipsychotic agents (Risperdal and Clozaril) due to the history of at least 3 antipsychotic medication trials and failure of multiple trials of monotherapy.

## 2023-10-10 NOTE — PLAN OF CARE
Problem: Alteration in Thoughts and Perception  Goal: Treatment Goal: Gain control of psychotic behaviors/thinking, reduce/eliminate presenting symptoms and demonstrate improved reality functioning upon discharge  Outcome: Progressing  Goal: Refrain from acting on delusional thinking/internal stimuli  Description: Interventions:  - Monitor patient closely, per order   - Utilize least restrictive measures   - Set reasonable limits, give positive feedback for acceptable   - Administer medications as ordered and monitor of potential side effects  Outcome: Progressing  Goal: Agree to be compliant with medication regime, as prescribed and report medication side effects  Description: Interventions:  - Offer appropriate PRN medication and supervise ingestion; conduct AIMS, as needed   Outcome: Progressing     Problem: Ineffective Coping  Goal: Identifies healthy coping skills  Outcome: Progressing  Goal: Demonstrates healthy coping skills  Outcome: Progressing  Goal: Understands least restrictive measures  Description: Interventions:  - Utilize least restrictive behavior  Outcome: Progressing  Goal: Free from restraint events  Description: - Utilize least restrictive measures   - Provide behavioral interventions   - Redirect inappropriate behaviors   Outcome: Progressing     Problem: Risk for Self Injury/Neglect  Goal: Verbalize thoughts and feelings  Description: Interventions:  - Assess and re-assess patient's lethality and potential for self-injury  - Engage patient in 1:1 interactions, daily, for a minimum of 15 minutes  - Encourage patient to express feelings, fears, frustrations, hopes  - Establish rapport/trust with patient   Outcome: Progressing  Goal: Refrain from harming self  Description: Interventions:  - Monitor patient closely, per order  - Develop a trusting relationship  - Supervise medication ingestion, monitor effects and side effects   Outcome: Progressing     Problem: Depression  Goal: Refrain from isolation  Description: Interventions:  - Develop a trusting relationship   - Encourage socialization   Outcome: Progressing  Goal: Refrain from self-neglect  Outcome: Progressing     Problem: Anxiety  Goal: Anxiety is at manageable level  Description: Interventions:  - Assess and monitor patient's anxiety level. - Monitor for signs and symptoms (heart palpitations, chest pain, shortness of breath, headaches, nausea, feeling jumpy, restlessness, irritable, apprehensive). - Collaborate with interdisciplinary team and initiate plan and interventions as ordered.   - [place] patient to unit/surroundings  - Explain treatment plan  - Encourage participation in care  - Encourage verbalization of concerns/fears  - Identify coping mechanisms  - Assist in developing anxiety-reducing skills  - Administer/offer alternative therapies  - Limit or eliminate stimulants  Outcome: Progressing     Problem: DISCHARGE PLANNING - CARE MANAGEMENT  Goal: Discharge to post-acute care or home with appropriate resources  Description: INTERVENTIONS:  - Conduct assessment to determine patient/family and health care team treatment goals, and need for post-acute services based on payer coverage, community resources, and patient preferences, and barriers to discharge  - Address psychosocial, clinical, and financial barriers to discharge as identified in assessment in conjunction with the patient/family and health care team  - Arrange appropriate level of post-acute services according to patient’s   needs and preference and payer coverage in collaboration with the physician and health care team  - Communicate with and update the patient/family, physician, and health care team regarding progress on the discharge plan  - Arrange appropriate transportation to post-acute venues  Outcome: Progressing     Problem: Nutrition/Hydration-ADULT  Goal: Nutrient/Hydration intake appropriate for improving, restoring or maintaining nutritional needs  Description: Monitor and assess patient's nutrition/hydration status for malnutrition. Collaborate with interdisciplinary team and initiate plan and interventions as ordered. Monitor patient's weight and dietary intake as ordered or per policy. Utilize nutrition screening tool and intervene as necessary. Determine patient's food preferences and provide high-protein, high-caloric foods as appropriate.      INTERVENTIONS:  - Monitor oral intake, urinary output, labs, and treatment plans  - Assess nutrition and hydration status and recommend course of action  - Evaluate amount of meals eaten  - Assist patient with eating if necessary   - Allow adequate time for meals  - Recommend/ encourage appropriate diets, oral nutritional supplements, and vitamin/mineral supplements  - Order, calculate, and assess calorie counts as needed  - Recommend, monitor, and adjust tube feedings and TPN/PPN based on assessed needs  - Assess need for intravenous fluids  - Provide specific nutrition/hydration education as appropriate  - Include patient/family/caregiver in decisions related to nutrition  Outcome: Progressing

## 2023-10-10 NOTE — PROGRESS NOTES
10/10/23   Team Meeting   Meeting Type Daily Rounds   Team Members Present   Team Members Present Physician;Nurse;;Occupational Therapist   Physician Team Member Dr. Jessee Bates DO; JARED Holt   Nursing Team Member Fernanda Mittal RN   Care Management Team Member MS Ericka, [facility]   OT Team Member Kwame Pope   Patient/Family Present   Patient Present No   Patient's Family Present No   Will d/c to [facility] tomorrow and continue with act services. reviewed labs. ekg ordered.

## 2023-10-10 NOTE — SOCIAL WORK
CM placed follow up call to [facility] team [phone], left message requesting return call. CM placed call to [facility]  Spoke with Edd Hill and updated he on PT status and plan of care, along with discharge plan for tomorrow, Edd Hill in agreement. Gladys directed for OLIVE to call [facility] nurse . CM in agreement. Gladys directed for CM to make transportation arrangements with ACT, CM in agreement, call ended mutually. OLIVE placed call to [facility] nurse with [facility]. CM updated her on PT status and plan for discharge tomorrow, [place] in agreement. CM to make transportation arrangements with ACT, CM in agreement, call ended mutually. [phone].

## 2023-10-10 NOTE — NURSING NOTE
Pt was visible on unit but mostly withdrawn to self. Pt denies anxiety, depression, SI/HI/AVH. Pt is frustrated over medication changes. Pt refused scheduled atarax. Pt educated on medication compliance. Pt continued to refuse medication. Q 7 minute checks maintained.

## 2023-10-10 NOTE — PLAN OF CARE
Problem: DISCHARGE PLANNING - CARE MANAGEMENT  Goal: Discharge to post-acute care or home with appropriate resources  Description: INTERVENTIONS:  - Conduct assessment to determine patient/family and health care team treatment goals, and need for post-acute services based on payer coverage, community resources, and patient preferences, and barriers to discharge  - Address psychosocial, clinical, and financial barriers to discharge as identified in assessment in conjunction with the patient/family and health care team  - Arrange appropriate level of post-acute services according to patient’s   needs and preference and payer coverage in collaboration with the physician and health care team  - Communicate with and update the patient/family, physician, and health care team regarding progress on the discharge plan  - Arrange appropriate transportation to post-acute venues  Outcome: Completed   PT will d/c to [facility] tomorrow and follow up with [facility] ACT team.

## 2023-10-10 NOTE — NURSING NOTE
Pt withdrawn to room stating GI pain throughout the day. Pt endorsed moderate depression and anxiety, denies SI/HI, A+VH. Pt stated she had night terrors of people touching her inappropriately and it frightens her. Pt stated she uses 1mg Ativan HS at home to control night terrors and would like her dose schedule changed to meet this need. Currently given at 1800. Pt compliant with all HS medications. Refused lactobacillus and atarax. Atarax wasted d/t pt refusal after med was taken out of blister pack. 2 RN waste. YUDY Johnson witnessed waste. WCTM. Q7 minute safety checks maintained.

## 2023-10-10 NOTE — BH TRANSITION RECORD
Contact Information: If you have any questions, concerns, pended studies, tests and/or procedures, or emergencies regarding your inpatient behavioral health visit. Please contact [phone] and ask to speak to a , nurse or physician. A contact is available 24 hours/ 7 days a week at this number. Summary of Procedures Performed During your Stay:  Below is a list of major procedures performed during your hospital stay and a summary of results:  - Cardiac Procedures/Studies: EKG. Pending Studies (From admission, onward)     Start     Ordered    10/07/23 1520  STD screening; No; 181195; STD - Miscellaneous Test  Once        Question Answer Comment   What is the name of the test you wish to perform? STD screening    Is this a patient test kit? No    What is the test code, if known? 411870    What is the testing lab, if known? STD        10/07/23 1519              Please follow up on the above pending studies with your PCP and/or referring provider.

## 2023-10-10 NOTE — SOCIAL WORK
OLIVE received voicemail from Jorge Luis with [facility] -[phone], OLIVE placed return call and updated him on PT status and plan of care. Reviewed discharge plan for tomorrow, Jorge Luis in agreement he will update team. Jorge Luis will speak with team to make transportation arrangements for PT and will follow up with time. CM in agreement. Call ended mutually. OLIVE placed call to PT sister Loli De La Torre for family check in. [phone], left message informing of PT scheduled discharge, requested return call with any questions.

## 2023-10-10 NOTE — PROGRESS NOTES
Progress Note - Jackie Abdul 47 y.o. female MRN: [id number]    Unit/Bed#Jarad Jensen 209-02 Encounter: [id number]        Subjective:   Patient seen and examined at bedside after reviewing the chart and discussing the case with the caring staff. Patient examined at bedside. Patient reports no acute symptoms. Patient STD panel is pending. Patient is a possible discharge on Wednesday, 10/11/2023. Patient is requesting all her prescriptions. I reviewed and reconciled patient's problem list and medications. Physical Exam   Vitals: Blood pressure 128/86, pulse 82, temperature 97.9 °F (36.6 °C), temperature source Temporal, resp. rate 16, height 5' (1.524 m), weight 67 kg (147 lb 12.8 oz), last menstrual period 01/01/2020, SpO2 95 %, not currently breastfeeding. ,Body mass index is 28.87 kg/m². Constitutional: Patient in no acute distress. HEENT: PERR, EOMI, MMM, neck supple. Cardiovascular: Normal rate and regular rhythm. Pulmonary/Chest: Effort normal and breath sounds normal.   Abdomen: Soft, + BS, NT. Assessment/Plan:  Jackie Abdul is a(n) 47y.o. year old female with schizoaffective disorder.     Medical clearance. Patient is medically cleared for discharge. All scripts will be sent out for the patient. 1. GERD.  Continue Protonix 40 mg daily, Mylanta as needed. 2. Tobacco abuse.  NRT. 3. Hyperlipidemia.  Continue fenofibrate 145 mg daily. 4. Asthma/cough.  Patient is on Flovent twice daily, albuterol inhaler as needed.  She was started on Mucinex twice daily. 5. Allergic rhinitis.  Patient is on Claritin 10 mg daily. 6. Vitamin D deficiency.  Continue vitamin D3 1000 units daily.   7. Vitamin B12 deficiency. Patient started on monthly B12 injections.   8. Hemorrhoids.  Patient may use Anusol cream as needed. 9. Vulvar rash. Patient may use Mycolog-II ointment twice daily over the affected area. Diflucan 150 mg x 1 dose. I will order STD profile. 10. Chronic low back pain.   Tylenol, Robaxin and Voltaren gel as needed. 11. Constipation. We will put the patient on sorbitol 70% every 2 hours x 3 as needed for constipation. Also get Dulcolax tablet as needed daily. Add milk of magnesia as needed.

## 2023-10-10 NOTE — DISCHARGE INSTR - OTHER ORDERS
You are being discharged to [facility] [address], Phone: [phone]. Triggers you have identified during your hospitalization that led to your admission distressed mood, includes regression in mental health. Coping skills you have identified during your hospitalization include talking with others, music, movies. If you are unable to deal with your distressed mood alone please contact your primary care provider Dr. Sourav Starr,  at [phone], your [place] ACT team at , your [facility] at . If that is not effective and you continue to have (ex: suicidal ideation, homicidal ideation, distressed mood, overwhelmed, in crisis) please contact (Crisis #) [facility] 67 [phone] I707223, dial 911 or go to the nearest emergency center. [facility] Crisis Hotline: 1 976.414.2455  *National Suicide Prevention Lifeline:  3-749.370.9684  *Alcohol Anonymous: 619.217.3832  *[place] Drug & Alcohol Commission: (458) 815-8577  [address] on [address] [place] [address] ([facility]) [place]: 128.960.9007/Website: www.mariana.org  *Substance Abuse and [address] Administration([facility]) [employer], which is a confidential, free, 24-hour-a-day, 365-day-a-year, information service for individuals and family members facing mental health and/or substance use disorders. This service provides referrals to local treatment facilities, support groups, and community-based organizations. Callers can also order free publications and other information. Call 4-224.490.1091/Website: www.[facility].gov  *[employer] 2-1-1: This is a toll free, confidential, 24-hour-a-day service which connects you to a community  in your area who can help you find services and resources that are available to you locally and provide critical services that can improve and save lives.   Call: Wu Weeks: [email or web address]/       [place] [place] or Mayi our [facility], will be calling you after your discharge, on the phone number that you provided. They will be available as an additional support, if needed.    If you wish to speak with one of them, you may contact [address] [place] [address] at [phone] or [employer] at [phone]

## 2023-10-10 NOTE — SOCIAL WORK
CM placed call to [facility] team. [phone]. Left message requesting return call.

## 2023-10-10 NOTE — SOCIAL WORK
OLIVE received call from Jorge Luis with [facility] requesting that we keep PT till Friday because they are not able to provide transportation until that time. OLIVE reviewed that its not appropriate reason for PT to remain inpatient in restricted setting and asked that act team work with [facility] to make transportation arrangements, Jorge Luis in agreement with this plan and will follow up with OLIVE on confirmed transport time. Call ended mutually.

## 2023-10-11 ENCOUNTER — TELEPHONE (OUTPATIENT)
Age: 53
End: 2023-10-11

## 2023-10-11 VITALS
HEIGHT: 60 IN | DIASTOLIC BLOOD PRESSURE: 73 MMHG | TEMPERATURE: 97.2 F | WEIGHT: 147.8 LBS | SYSTOLIC BLOOD PRESSURE: 120 MMHG | OXYGEN SATURATION: 94 % | HEART RATE: 89 BPM | BODY MASS INDEX: 29.02 KG/M2 | RESPIRATION RATE: 18 BRPM

## 2023-10-11 PROCEDURE — 99239 HOSP IP/OBS DSCHRG MGMT >30: CPT

## 2023-10-11 RX ADMIN — Medication 1000 UNITS: at 08:47

## 2023-10-11 RX ADMIN — NYSTATIN AND TRIAMCINOLONE ACETONIDE: 100000; 1 OINTMENT TOPICAL at 08:45

## 2023-10-11 RX ADMIN — LAMOTRIGINE 100 MG: 100 TABLET ORAL at 08:46

## 2023-10-11 RX ADMIN — LORATADINE 10 MG: 10 TABLET ORAL at 08:47

## 2023-10-11 RX ADMIN — VENLAFAXINE HYDROCHLORIDE 150 MG: 150 CAPSULE, EXTENDED RELEASE ORAL at 08:46

## 2023-10-11 RX ADMIN — NICOTINE 1 PATCH: 21 PATCH, EXTENDED RELEASE TRANSDERMAL at 08:46

## 2023-10-11 RX ADMIN — FLUTICASONE PROPIONATE 2 PUFF: 220 AEROSOL, METERED RESPIRATORY (INHALATION) at 08:45

## 2023-10-11 RX ADMIN — LORAZEPAM 0.5 MG: 0.5 TABLET ORAL at 08:47

## 2023-10-11 RX ADMIN — FENOFIBRATE 145 MG: 145 TABLET, FILM COATED ORAL at 08:47

## 2023-10-11 RX ADMIN — Medication 1 PACKET: at 08:46

## 2023-10-11 RX ADMIN — GUAIFENESIN 600 MG: 600 TABLET ORAL at 08:46

## 2023-10-11 NOTE — PROGRESS NOTES
10/11/23    Team Meeting   Meeting Type Daily Rounds   Team Members Present   Team Members Present Physician;Nurse;   Physician Team Member  [facility], DO; JARED Del Cid   Nursing Team Member Jamarcus Maria RN   Care Management Team Member MS Ericka, [facility], [facility]   OT Team Member Kwame Pineda   Patient/Family Present   Patient Present No   Patient's Family Present No   Will d/c to [facility] today and continue with [facility] ACT services. Reilly. At baseline.  Group home will  at 10am.

## 2023-10-11 NOTE — PLAN OF CARE
Problem: Alteration in Thoughts and Perception  Goal: Treatment Goal: Gain control of psychotic behaviors/thinking, reduce/eliminate presenting symptoms and demonstrate improved reality functioning upon discharge  Outcome: Completed  Goal: Refrain from acting on delusional thinking/internal stimuli  Description: Interventions:  - Monitor patient closely, per order   - Utilize least restrictive measures   - Set reasonable limits, give positive feedback for acceptable   - Administer medications as ordered and monitor of potential side effects  Outcome: Completed  Goal: Agree to be compliant with medication regime, as prescribed and report medication side effects  Description: Interventions:  - Offer appropriate PRN medication and supervise ingestion; conduct AIMS, as needed   Outcome: Completed  Goal: Attend and participate in unit activities, including therapeutic, recreational, and educational groups  Description: Interventions:  -Encourage Visitation and family involvement in care  Outcome: Completed     Problem: Ineffective Coping  Goal: Identifies healthy coping skills  Outcome: Completed  Goal: Demonstrates healthy coping skills  Outcome: Completed  Goal: Participates in unit activities  Description: Interventions:  - Provide therapeutic environment   - Provide required programming   - Redirect inappropriate behaviors   Outcome: Completed  Goal: Patient/Family participate in treatment and DC plans  Description: Interventions:  - Provide therapeutic environment  Outcome: Completed  Goal: Patient/Family verbalizes awareness of resources  Outcome: Completed  Goal: Understands least restrictive measures  Description: Interventions:  - Utilize least restrictive behavior  Outcome: Completed  Goal: Free from restraint events  Description: - Utilize least restrictive measures   - Provide behavioral interventions   - Redirect inappropriate behaviors   Outcome: Completed     Problem: Risk for Self Injury/Neglect  Goal: Treatment Goal: Remain safe during length of stay, learn and adopt new coping skills, and be free of self-injurious ideation, impulses and acts at the time of discharge  Outcome: Completed  Goal: Verbalize thoughts and feelings  Description: Interventions:  - Assess and re-assess patient's lethality and potential for self-injury  - Engage patient in 1:1 interactions, daily, for a minimum of 15 minutes  - Encourage patient to express feelings, fears, frustrations, hopes  - Establish rapport/trust with patient   Outcome: Completed  Goal: Refrain from harming self  Description: Interventions:  - Monitor patient closely, per order  - Develop a trusting relationship  - Supervise medication ingestion, monitor effects and side effects   Outcome: Completed  Goal: Attend and participate in unit activities, including therapeutic, recreational, and educational groups  Description: Interventions:  - Provide therapeutic and educational activities daily, encourage attendance and participation, and document same in the medical record  - Obtain collateral information, encourage visitation and family involvement in care   Outcome: Completed     Problem: Depression  Goal: Treatment Goal: Demonstrate behavioral control of depressive symptoms, verbalize feelings of improved mood/affect, and adopt new coping skills prior to discharge  Outcome: Completed  Goal: Refrain from isolation  Description: Interventions:  - Develop a trusting relationship   - Encourage socialization   Outcome: Completed  Goal: Refrain from self-neglect  Outcome: Completed  Goal: Attend and participate in unit activities, including therapeutic, recreational, and educational groups  Description: Interventions:  - Provide therapeutic and educational activities daily, encourage attendance and participation, and document same in the medical record   Outcome: Completed  Goal: Complete daily ADLs, including personal hygiene independently, as able  Description: Interventions:  - Observe, teach, and assist patient with ADLS  -  Monitor and promote a balance of rest/activity, with adequate nutrition and elimination   Outcome: Completed     Problem: Anxiety  Goal: Anxiety is at manageable level  Description: Interventions:  - Assess and monitor patient's anxiety level. - Monitor for signs and symptoms (heart palpitations, chest pain, shortness of breath, headaches, nausea, feeling jumpy, restlessness, irritable, apprehensive). - Collaborate with interdisciplinary team and initiate plan and interventions as ordered. - [place] patient to unit/surroundings  - Explain treatment plan  - Encourage participation in care  - Encourage verbalization of concerns/fears  - Identify coping mechanisms  - Assist in developing anxiety-reducing skills  - Administer/offer alternative therapies  - Limit or eliminate stimulants  Outcome: Completed     Problem: Nutrition/Hydration-ADULT  Goal: Nutrient/Hydration intake appropriate for improving, restoring or maintaining nutritional needs  Description: Monitor and assess patient's nutrition/hydration status for malnutrition. Collaborate with interdisciplinary team and initiate plan and interventions as ordered. Monitor patient's weight and dietary intake as ordered or per policy. Utilize nutrition screening tool and intervene as necessary. Determine patient's food preferences and provide high-protein, high-caloric foods as appropriate.      INTERVENTIONS:  - Monitor oral intake, urinary output, labs, and treatment plans  - Assess nutrition and hydration status and recommend course of action  - Evaluate amount of meals eaten  - Assist patient with eating if necessary   - Allow adequate time for meals  - Recommend/ encourage appropriate diets, oral nutritional supplements, and vitamin/mineral supplements  - Order, calculate, and assess calorie counts as needed  - Recommend, monitor, and adjust tube feedings and TPN/PPN based on assessed needs  - Assess need for intravenous fluids  - Provide specific nutrition/hydration education as appropriate  - Include patient/family/caregiver in decisions related to nutrition  Outcome: Completed

## 2023-10-11 NOTE — NURSING NOTE
No acute behaviors during shift. No complaints voiced. No respiratory distress or change in medical condition. Observed sleeping overnight. Maintained on q 7 minute safety checks. Will continue to monitor.

## 2023-10-11 NOTE — TELEPHONE ENCOUNTER
Pt is being discharged from [facility] in [facility] today.   I set up a long visit with Dr Willye Lesch for her next Mon Oct 16    It should be under TCM

## 2023-10-11 NOTE — PROGRESS NOTES
Progress Note - Victoria Medina 47 y.o. female MRN: [id number]    Unit/Bed#Ld Monday 209-02 Encounter: [id number]        Subjective:   Patient seen and examined at bedside after reviewing the chart and discussing the case with the caring staff. Patient examined at bedside. Patient reports no acute symptoms. Patient is being discharged today, Wednesday 10/11/2023. Patient is requesting all her prescriptions. I reviewed and reconciled patient's problem list and medications. Physical Exam   Vitals: Blood pressure 120/73, pulse 89, temperature (!) 97.2 °F (36.2 °C), temperature source Temporal, resp. rate 18, height 5' (1.524 m), weight 67 kg (147 lb 12.8 oz), last menstrual period 01/01/2020, SpO2 94 %, not currently breastfeeding. ,Body mass index is 28.87 kg/m². Constitutional: Patient in no acute distress. HEENT: PERR, EOMI, MMM, neck supple. Cardiovascular: Normal rate and regular rhythm. Pulmonary/Chest: Effort normal and breath sounds normal.   Abdomen: Soft, + BS, NT. Assessment/Plan:  Victoria Medina is a(n) 47y.o. year old female with schizoaffective disorder. Medical clearance. Patient is medically cleared for discharge. All scripts will be sent out for the patient. 1. GERD. Continue Protonix 40 mg daily, Mylanta as needed. 2. Tobacco abuse. NRT. 3. Hyperlipidemia. Continue fenofibrate 145 mg daily. 4. Asthma/cough. Patient is on Flovent twice daily, albuterol inhaler as needed. She was started on Mucinex twice daily. 5. Allergic rhinitis. Patient is on Claritin 10 mg daily. 6. Vitamin D deficiency. Continue vitamin D3 1000 units daily. 7. Vitamin B12 deficiency. Patient started on monthly B12 injections. 8. Hemorrhoids. Patient may use Anusol cream as needed. 9. Vulvar rash. Patient may use Mycolog-II ointment twice daily over the affected area. STD profile pending - pt instructed to follow-up with PCP for results. 10. Chronic low back pain.   Tylenol, Robaxin and Voltaren gel as needed. 11. Constipation. Sorbitol 70% every 2 hours x 3 as needed for constipation. Dulcolax tablet, MOM as needed. The patient was discussed with Dr. Jenny Montaño and he is in agreement with the above note.

## 2023-10-11 NOTE — NURSING NOTE
Patient withdrawn to room tonight. Assessment completed, anxiety, depression and suicidal ideations all denied. Affect is flat. Appears depressed. Brightened during assessment. No acute behaviors. Denied current hallucinations. Was compliant with medications, except Atarax 25 mg.  Medication education completed. Safe environment provided for safety and fall prevention. Maintained on q 7 minute checks. Fluids at bedside to promote hydration, offered during interactions. Will continue to monitor.

## 2023-10-11 NOTE — NURSING NOTE
Pt ambulated from unit accompanied by staff. AVS reviewed w/ pt and she verbalizes understanding. AVS sent w/ pt for receiving facility. All belongings sent w/ pt.

## 2023-10-11 NOTE — SOCIAL WORK
CM met with PT for PT check in. Reviewed discharge plan along with follow up care and supports, PT in agreement with all. PT denies si/hi/vh, minimal ah non command, little anxiety and no depression. Reassurance provided.

## 2023-10-11 NOTE — NURSING NOTE
Patient has been withdrawn to herself this morning. Visible during meal times. She is pleasant and appropriate. She has been compliant with her scheduled medications- with the exception of scheduled Atarax- pt states she "doesn't need it". She denies anxiety and depression. Denies auditory hallucinations at this time. Pt is ready to be discharged this morning. She offers no current complaints/ concerns. Plan of care continues. Q7 minute safety checks in progress.

## 2023-10-11 NOTE — PROGRESS NOTES
Patient left with the following items:    Pink and white long top   Pink night gown with bear  Peach shirt  Peach pants  Red leggings multi colors  Pink and purple flower night gown  Black and white leggings  White sports bra  White bra  Pink pj shorts  5 pair of underwear  Black and orange leggins  Brown shirt  1 pink bag filled with personal care items  [place] dress  Yellow tank top  Pink tank top  Red tank top  Five pair of socks  Black and yellow bag  Gray shoes  1 clip on sunglasses  1 pair of sunglasses  3 hard glass case  1 soft case  2 pair of eyeglasses  1 lighter 5 cigarettes and 4 receipts    From security   1 brown wallet  1 black wallet  1 PA DL  1 SSC  1 Direct express master card  1 medical card  2 lottery tickets  $82.30  [employer] gift card

## 2023-10-11 NOTE — SOCIAL WORK
CM placed call to  primary care [phone] to notify of PT scheduled discharge. Spoke with Ed Poster is scheduled for follow up on 10/16/23 at 10am. Call ended mutually.

## 2023-10-12 LAB — MISCELLANEOUS LAB TEST RESULT: NORMAL

## 2023-10-13 NOTE — ED NOTES
[place] Suicide Risk Assessment deferred, as unable to assess while patient sleeping  Behavioral Health Assessment deferred as patient is sleeping and would benefit from additional rest   Vital signs deferred until patient awake, no signs or symptoms of respiratory distress at this time  Once patient is awake and able to participate, will complete assessments       Scar Pedraza RN  05/10/22 6407 No

## 2023-10-31 ENCOUNTER — CONSULT (OUTPATIENT)
Age: 53
End: 2023-10-31
Payer: COMMERCIAL

## 2023-10-31 VITALS
HEIGHT: 60 IN | WEIGHT: 147 LBS | BODY MASS INDEX: 28.86 KG/M2 | SYSTOLIC BLOOD PRESSURE: 104 MMHG | DIASTOLIC BLOOD PRESSURE: 80 MMHG | HEART RATE: 101 BPM | OXYGEN SATURATION: 94 % | TEMPERATURE: 98.2 F

## 2023-10-31 DIAGNOSIS — J43.9 PULMONARY EMPHYSEMA, UNSPECIFIED EMPHYSEMA TYPE (HCC): Primary | ICD-10-CM

## 2023-10-31 DIAGNOSIS — F17.210 CIGARETTE NICOTINE DEPENDENCE WITHOUT COMPLICATION: ICD-10-CM

## 2023-10-31 DIAGNOSIS — J44.9 CHRONIC OBSTRUCTIVE PULMONARY DISEASE, UNSPECIFIED COPD TYPE (HCC): ICD-10-CM

## 2023-10-31 PROBLEM — J02.9 SORE THROAT: Status: RESOLVED | Noted: 2022-07-22 | Resolved: 2023-10-31

## 2023-10-31 PROBLEM — R49.0 HOARSENESS: Status: RESOLVED | Noted: 2022-03-28 | Resolved: 2023-10-31

## 2023-10-31 PROBLEM — F17.200 SMOKING: Status: RESOLVED | Noted: 2020-08-17 | Resolved: 2023-10-31

## 2023-10-31 PROBLEM — IMO0001 SMOKING: Status: RESOLVED | Noted: 2020-08-17 | Resolved: 2023-10-31

## 2023-10-31 PROCEDURE — 99244 OFF/OP CNSLTJ NEW/EST MOD 40: CPT | Performed by: INTERNAL MEDICINE

## 2023-10-31 RX ORDER — FLUTICASONE PROPIONATE AND SALMETEROL XINAFOATE 115; 21 UG/1; UG/1
2 AEROSOL, METERED RESPIRATORY (INHALATION) 2 TIMES DAILY
Qty: 12 G | Refills: 6 | Status: SHIPPED | OUTPATIENT
Start: 2023-10-31

## 2023-10-31 NOTE — ASSESSMENT & PLAN NOTE
Patient is symptoms most consistent with emphysema/COPD.   There is emphysematous change on her CT imaging and possible component of RB ILD  Encourage complete tobacco cessation  Continue as needed albuterol  Discontinue Flovent discus  Start Advair /21, 2 puffs twice daily  Obtain complete PFTs
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
## 2023-10-31 NOTE — ASSESSMENT & PLAN NOTE
Current every day smoker with no evidence of pulmonary nodules previously on screening CT scan in 2021  Screening CT follow-up ordered by primary care but not yet scheduled. Encouraged her to have this scheduled by her group home  Findings of RB-ILD were questionable. There were peripheral emphysema changes.   Patient continues to meet lung cancer screening criteria and is agreeable to ongoing screening surveillance

## 2023-11-02 ENCOUNTER — APPOINTMENT (OUTPATIENT)
Age: 53
End: 2023-11-02
Payer: COMMERCIAL

## 2023-11-08 ENCOUNTER — TELEPHONE (OUTPATIENT)
Age: 53
End: 2023-11-08

## 2023-11-08 NOTE — TELEPHONE ENCOUNTER
[facility] calling to let us know that Julio Antony is no longer taking the following  Medication:    actobacillus acidophilus-bulgaricus [facility]) packet     nystatin-triamcinolone (MYCOLOG-II) ointment     Please remove from her med list. She is getting charged for these .

## 2023-11-10 NOTE — TELEPHONE ENCOUNTER
[employer] from [facility] ,would like us to fax   [employer]  to ALEXANDER Pablo   actobacillus acidophilus-bulgaricus [facility]) packet      nystatin-triamcinolone (MYCOLOG-II) ointment   from her med list ..

## 2023-11-14 NOTE — TELEPHONE ENCOUNTER
[facility] just called asking that we fax the form again to them and the pharmacy because they have not received it.     Fax [phone] [facility]  Fax [phone] Pharmacy

## 2023-11-14 NOTE — TELEPHONE ENCOUNTER
RICHIE     STOP   BY  WITH  A  FORM  FOR  DR MONGE  TO  SIGN  TO  DISCONTINUE  HER    RXS     THEY  SAID  THIS  HAS  BEEN  GOING  ON  FOR  AWHILE     CAN   MARIPOSA  SIGN  IT   AND  FAX  IT  BACK  TO  [facility]   [phone]

## 2023-11-22 ENCOUNTER — TELEPHONE (OUTPATIENT)
Age: 53
End: 2023-11-22

## 2023-11-22 DIAGNOSIS — J45.30 MILD PERSISTENT ASTHMA WITHOUT COMPLICATION: Primary | ICD-10-CM

## 2023-11-22 NOTE — TELEPHONE ENCOUNTER
She she should have access to Maalox, Dulcolax and Colace as needed. Is on her active medication list.    I will prescribe as needed Mucinex.

## 2023-11-22 NOTE — TELEPHONE ENCOUNTER
Pt needs something to relieve her constipation; something that'll move her bowels      Also needs something to relieve her mucous, has COPD

## 2023-11-25 DIAGNOSIS — T78.40XS ALLERGY, SEQUELA: ICD-10-CM

## 2023-11-26 RX ORDER — CETIRIZINE HYDROCHLORIDE 10 MG/1
TABLET ORAL
Qty: 28 TABLET | Refills: 4 | Status: SHIPPED | OUTPATIENT
Start: 2023-11-26

## 2023-11-28 ENCOUNTER — HOSPITAL ENCOUNTER (OUTPATIENT)
Dept: PULMONOLOGY | Facility: HOSPITAL | Age: 53
Discharge: HOME | End: 2023-11-28
Attending: INTERNAL MEDICINE
Payer: COMMERCIAL

## 2023-11-28 DIAGNOSIS — J44.9 CHRONIC OBSTRUCTIVE PULMONARY DISEASE, UNSPECIFIED COPD TYPE (HCC): ICD-10-CM

## 2023-11-28 PROCEDURE — 94726 PLETHYSMOGRAPHY LUNG VOLUMES: CPT

## 2023-11-28 PROCEDURE — 94726 PLETHYSMOGRAPHY LUNG VOLUMES: CPT | Performed by: INTERNAL MEDICINE

## 2023-11-28 PROCEDURE — 94760 N-INVAS EAR/PLS OXIMETRY 1: CPT

## 2023-11-28 PROCEDURE — 94060 EVALUATION OF WHEEZING: CPT

## 2023-11-28 PROCEDURE — 94060 EVALUATION OF WHEEZING: CPT | Performed by: INTERNAL MEDICINE

## 2023-11-28 PROCEDURE — 94729 DIFFUSING CAPACITY: CPT

## 2023-11-28 PROCEDURE — 94729 DIFFUSING CAPACITY: CPT | Performed by: INTERNAL MEDICINE

## 2023-11-28 RX ORDER — ALBUTEROL SULFATE 2.5 MG/3ML
2.5 SOLUTION RESPIRATORY (INHALATION) ONCE
Status: COMPLETED | OUTPATIENT
Start: 2023-11-28 | End: 2023-11-28

## 2023-11-28 RX ADMIN — ALBUTEROL SULFATE 2.5 MG: 2.5 SOLUTION RESPIRATORY (INHALATION) at 12:41

## 2023-11-29 ENCOUNTER — APPOINTMENT (OUTPATIENT)
Age: 53
End: 2023-11-29
Payer: COMMERCIAL

## 2023-11-29 DIAGNOSIS — F20.0 PARANOID SCHIZOPHRENIA, SUBCHRONIC CONDITION (HCC): ICD-10-CM

## 2023-11-30 ENCOUNTER — TELEPHONE (OUTPATIENT)
Age: 53
End: 2023-11-30

## 2023-11-30 NOTE — TELEPHONE ENCOUNTER
Left massage:    I'm calling to see if I could have a diflucan pill for yeast infection. I just got done. Today was my last day on Zithromax and the antibiotic gave me a yeast infection on urgent care gave it to me. I was sick and I went to urgent care, put me on the system Max last week, five days ago and today's the last day. So if you could help me out, I'd appreciate it. The numbers, [phone]. I see Doctor Milli Gamboa. Thanks.  Tom.

## 2023-12-01 ENCOUNTER — TELEPHONE (OUTPATIENT)
Dept: PULMONOLOGY | Facility: CLINIC | Age: 53
End: 2023-12-01

## 2023-12-01 DIAGNOSIS — J45.21 MILD INTERMITTENT ASTHMATIC BRONCHITIS WITH ACUTE EXACERBATION: ICD-10-CM

## 2023-12-01 RX ORDER — ALBUTEROL SULFATE 90 UG/1
2 AEROSOL, METERED RESPIRATORY (INHALATION) EVERY 4 HOURS PRN
Qty: 18 G | Refills: 5 | Status: SHIPPED | OUTPATIENT
Start: 2023-12-01

## 2023-12-01 NOTE — TELEPHONE ENCOUNTER
Reviewed pulmonary function with the patient by phone  Was followed in the [facility] office by me but moving to [facility] and requests follow-up with provider in the [place].   Please make arrangements for follow-up in 2 to 3 months with a new provider in the [place] office    Heidi Mays

## 2023-12-08 ENCOUNTER — TELEPHONE (OUTPATIENT)
Dept: HEMATOLOGY ONCOLOGY | Facility: CLINIC | Age: 53
End: 2023-12-08

## 2023-12-08 NOTE — TELEPHONE ENCOUNTER
Appointment Change  Cancel, Reschedule, Change to Virtual      Who are you speaking with? Physician Office   If it is not the patient, is the caller listed on the communication consent form? Yes   Which provider is the appointment scheduled with? Dr. Adama Manzano   When was the original appointment scheduled? Please list date and time 12/12/23 @ 4 w/ Ophelia Humphrey @ [facility]   At which location is the appointment scheduled to take place? [place]   Was the appointment rescheduled? Was the appointment changed from an in person visit to a virtual visit? If so, please list the details of the change. 12/22/23 @ 1   What is the reason for the appointment change? Conflicting appointments       Was STAR transport scheduled? No   Does STAR transport need to be scheduled for the new visit (if applicable) No   Does the patient need an infusion appointment rescheduled? No   Does the patient have an upcoming infusion appointment scheduled? If so, when? No   Is the patient undergoing chemotherapy? No   For appointments cancelled with less than 24 hours:  Was the no-show policy reviewed?  N/A

## 2023-12-08 NOTE — TELEPHONE ENCOUNTER
Appointment Change  Cancel, Reschedule, Change to Virtual      Who are you speaking with? Physician Office   If it is not the patient, is the caller listed on the communication consent form? Yes   Which provider is the appointment scheduled with? Dr. Rhiannon Granados   When was the original appointment scheduled? Please list date and time 12/12 10am   At which location is the appointment scheduled to take place? [facility]   Was the appointment rescheduled? Was the appointment changed from an in person visit to a virtual visit? If so, please list the details of the change. 12/12 4pm Kris Doing   What is the reason for the appointment change? Closer to home       Was STAR transport scheduled? No   Does STAR transport need to be scheduled for the new visit (if applicable) No   Does the patient need an infusion appointment rescheduled? No   Does the patient have an upcoming infusion appointment scheduled? If so, when? No   Is the patient undergoing chemotherapy? No   For appointments cancelled with less than 24 hours:  Was the no-show policy reviewed?  Yes

## 2023-12-12 NOTE — PROGRESS NOTES
Nephrology Follow up clinic note :     Chief complaint : follow up for   Chief Complaint   Patient presents with    Follow-up    Chronic Kidney Disease     6 month follow up CKD stage 3a        History of present illness :      This is a 71 year old year old male patient known case of DM for 2 years, HTN for 1.5 years who came today for establishment of care for his CKD.      Upon reviewing his chart, he has had CKD since 2016 with GFR 40-50. Patient did have an BENOIT in March 2019 which was pre-renal in nature from decreased PO intake which improved with fluids.      Patient may have had DM and HTN prior to diagnosis.      Patient denies any recent exposure to NSAID,contrast,antibiotics or herbal medications.  Patient denies any history of recurrent UTI or renal stones.     Patient denies any dysuria,hematuria,foamy urine,nocturia or increased frequency of urination.    Reports lower extremity swelling, possible amlodipine related.     12/12/2023 Interval follow up:  - Last visit was 6/15/2023  - No difficulty in urination   - on Golimumab for arthritis. Ibuprofen on med list but reports never needing to use.   - reports episodes of syncope and orthostatic dizziness.   - Eas Hospitalized at [facility] 10/13/2023 after a fall and head injury.     Past medical History :  Past Medical History:   Diagnosis Date    Arthritis     Bilateral carpal tunnel syndrome 11/15/2018    BPH (benign prostatic hyperplasia)     Brain bleed (CMD) 11/2019    From a fall    Chronic left shoulder pain 10/10/2018    Chronic pain     back    Diabetes mellitus (CMD)     ED (erectile dysfunction)     Essential (primary) hypertension     Gastroesophageal reflux disease     hiatal hernia,antral erosions,duodenal erosions,gastritis,esophagitis    High cholesterol     Kidney stones     Lumbosacral spondylosis without myelopathy 10/10/2018    Paresthesias/numbness 10/10/2018    Right hip pain 10/10/2018    SI (sacroiliac) joint dysfunction 10/10/2018     Progress Note - Behavioral Health     Trever Foster 53 y o  female MRN: [id number]   Unit/Bed#: [facility] 110-27 Encounter: [id number]      Subjective:  Patient's chart reviewed and case discussed with the treatment team   The patient was seen in the  Conference room  Still complains of toothache and will be assessed by the dentist     Reports mood otherwise has been okay  She denies any SI or HI  Reports feeling calm and relaxed  Feels current medication has been very helpful  Denies any depression or anxiety  Denies any auditory or visual hallucination today  Reports nightmare also has been better though not resolved  anxiety has been better  Compliant with the medication and denies any side effect  As per the nurse the patient was mildly agitated last night and got Zyprexa 2 5 mg around midnight  The patient also got Atarax 25 mg for mild anxiety  No behavior issues otherwise  ROS: all other systems are negative    Mental Status Evaluation:    Appearance:  casually dressed   Behavior:  normal   Speech:  normal rate, normal volume, normal pitch   Mood:  normal   Affect:  constricted   Thought Process:  organized   Associations: intact associations   Thought Content:  normal   Perceptual Disturbances:  denies any auditory or visual hallucination today     Risk Potential: Suicidal ideation - None  Homicidal ideation - None  Potential for aggression - No   Sensorium:  oriented to person, place and time/date   Memory:  recent and remote memory grossly intact   Consciousness:  alert and awake   Attention: attention span and concentration are age appropriate   Insight:  age appropriate   Judgment: age appropriate   Gait/Station: normal gait/station   Motor Activity: no abnormal movements     Vital signs in last 24 hours:    Temp:  [97 3 °F (36 3 °C)-98 3 °F (36 8 °C)] 98 3 °F (36 8 °C)  HR:  [80-92] 80  Resp:  [16] 16  BP: (107-112)/(59-83) 107/59    Laboratory results: I have personally reviewed all Syncope 06/2021    Trochanteric bursitis 10/10/2018    Urinary tract infection        ROS :  General : no fever,no fatigue,no significant weight gain/loss, no change in appetite   Ear/nose/throat   : no vision loss/changes, hearing difficulty, sinus pain or sore throat  Respiratory : no cough, no shortness of breath  Cardiovascular : no chest pain, palpitation or lower limb swelling  Gastrointestinal : no nausea,vomiting, diarrea or constipation  Genitourinary : no dysuria,hematuria,foamy urine or increased frequency of urination  Musculoskeletal : no back pain,no muscle or joint pain  Neurological :no headache, no seizure, no dizziness  Psychiatric : no depression, no memory loss  Hematologic : no bleeding, no bruises  Endocrine : no thirst, no heat or cold intolerance    Physical exam :  Visit Vitals  BP (!) 138/98 (BP Location: RUE - Right upper extremity, Patient Position: Standing, Cuff Size: Large Adult)   Pulse 86   Ht 5' 9\" (1.753 m)   Wt 93 kg (205 lb)   SpO2 98%   BMI 30.27 kg/m²     HEAD: normocephalic.  EYES: PERRL, EOMI.  THROAT: Oral cavity and pharynx normal. No inflammation, swelling, exudate, or lesions  NECK: Neck supple, non-tender without lymphadenopathy, masses or thyromegaly.  CARDIAC: Normal S1 and S2. no murmurs. Rhythm is regular. trace peripheral edema.  LUNGS: Clear to auscultation and percussion without rales, rhonchi, wheezing or diminished breath sounds.  ABDOMEN: Positive bowel sounds.  Soft, nondistended, nontender.  No guarding or rebound.  No masses.  MUSKULOSKELETAL:  ROM intact spine and extremities. No joint erythema or tenderness.  EXTREMITIES:synovial thickening noted on hands, significant range of motion reduction in all joints.  NEUROLOGICAL: CN II-XII intact. Strength and sensation symmetric and intact throughout  PSYCHIATRIC:  Patient was oriented to person, place, and time. Mood is stable.     Medications :  Current Outpatient Medications   Medication Sig Dispense Refill  pertinent laboratory/tests results  Progress Toward Goals: improving    Assessment/Plan   The patient nightmares has been better though still not resolved  Mood has been much better  Occasional agitation but responds well to p r n  medication  Plan is to continue with the current medication at this time with no changes  Will continue monitor the patient for her mood, behavior, safety, sleep and response to meds  Will continue to provide her with group, milieu and occupational therapy  Principal Problem:    Schizoaffective disorder, bipolar type ([address] )  Active Problems:    Post-traumatic stress disorder, chronic    Medical clearance for psychiatric admission    Mild intermittent asthma without complication    Ear problem, bilateral    Recommended Treatment:     Planned medication and treatment changes:     All current active medications have been reviewed  Encourage group therapy, milieu therapy and occupational therapy  Behavioral Health checks every 7 minutes  Continue current medications:  Current Facility-Administered Medications   Medication Dose Route Frequency Provider Last Rate    acetaminophen  650 mg Oral Q6H PRN Clay Shames Lodics, CRNP      acetaminophen  650 mg Oral Q4H PRN Clay Shamsusan Porterics, CRNP      acetaminophen  975 mg Oral Q6H PRN Isis Lama, CRNP      al mag oxide-diphenhydramine-lidocaine viscous  10 mL Swish & Swallow Q4H PRN Sonido Estrada, CRNP      albuterol  2 puff Inhalation Q6H PRN Clay Lama, CRNP      benztropine  1 mg Intramuscular Q4H PRN Max 6/day Isis Lama, CRNP      benztropine  1 mg Oral Q4H PRN Max 6/day Isis Lama, CRNP      benztropine  1 mg Oral HS Isis Lama, CRNP      calcium carbonate  500 mg Oral TID PRN Clay Lama, CRNP      diphenhydrAMINE  25 mg Oral HS Isis Lama, CRNP      escitalopram  20 mg Oral Daily Isis Lama, CRNP      fenofibrate  145 mg Oral Daily Jt, CRKALLIE     [facility]    tiZANidine (ZANAFLEX) 4 MG tablet Take 8 mg by mouth daily.      ibuprofen (MOTRIN) 200 MG tablet Take 400 mg by mouth every 6 hours as needed for Pain.      DULoxetine (CYMBALTA) 30 MG capsule Take 30 mg by mouth daily.      budesonide (PULMICORT FLEXHALER) 180 MCG/ACT inhaler Inhale 2 puffs into the lungs in the morning and 2 puffs in the evening. 1 each 3    albuterol 108 (90 Base) MCG/ACT inhaler INHALE 2 PUFFS EVERY 6 HOURS AS NEEDED FOR WHEEZING      aspirin (ECOTRIN) 81 MG EC tablet Take 1 tablet every day by oral route.      hydroxychloroquine (PLAQUENIL) 200 MG tablet Take 400 mg by mouth daily.      losartan (COZAAR) 25 MG tablet Take 0.5 tablets every day by oral route in the evening.      sildenafil (VIAGRA) 50 MG tablet Take 1 tablet 30-60 min prior to sexual activity.      DULoxetine (CYMBALTA) 60 MG capsule Take 60 mg by mouth daily.      glipiZIDE (GLUCOTROL ER) 10 MG 24 hr tablet Take 10 mg by mouth daily.      GOLIMumab (SIMPONI ARIA) 50 MG/4ML Solution injection SIMPONI ARIA 50 MG/4ML SOLN      amitriptyline (ELAVIL) 25 MG tablet Take 75 mg by mouth daily.      pantoprazole (PROTONIX) 40 MG tablet Take 1 tablet by mouth daily. Indications: Gastroesophageal Reflux Disease 90 tablet 0    Cholecalciferol (VITAMIN D) 50 mcg (2,000 units) capsule Take 2,000 Units by mouth daily.      Co-Enzyme Q10 200 MG Cap Take 200 mg by mouth daily. Indications: 5/15/19:  per fax received from Dr. Hernandez's office/per pharmacy pt never picked up       No current facility-administered medications for this visit.        Labs :  Lab results were reviewed by me  WBC (K/mcL)   Date Value   12/12/2023 6.2     RBC (mil/mcL)   Date Value   12/12/2023 5.05     HCT (%)   Date Value   12/12/2023 43.6     HGB (g/dL)   Date Value   12/12/2023 14.6     PLT (K/mcL)   Date Value   12/12/2023 219       Sodium (mmol/L)   Date Value   12/12/2023 140     Potassium (mmol/L)   Date Value   12/12/2023 4.7     Chloride (mmol/L)   Date  gabapentin  600 mg Oral 4x Daily Isis Lama, CRNP      haloperidol  5 mg Oral Q6H PRN Lovedannielle Lama, CRNP      hydrOXYzine HCL  25 mg Oral Q6H PRN Max 4/day Isis Lama, CRNP      hydrOXYzine HCL  50 mg Oral Q6H PRN Max 4/day Isis Lama, CRNP      lidocaine  1 patch Topical Daily Isis Lama, CRNP      LORazepam  1 mg Intramuscular Q6H PRN Ranjeet Castrejon MD      LORazepam  0 5 mg Oral 4x Daily Ranejet Castrejon MD      magnesium hydroxide  30 mL Oral Daily PRN Lovena Dear Germanics, CRNP      nicotine  1 patch Transdermal Daily Jessica Tapia, JARED      OLANZapine  2 5 mg Oral Q8H PRN Jessica Tapia, CRNP      OLANZapine  5 mg Oral Q3H PRN Jessica Tapia, CRNP      OLANZapine  7 5 mg Oral Q3H PRN Jessica Tapia, CRNP      OLANZapine  10 mg Intramuscular Q3H PRN Jessica Tapia, CRNP      OLANZapine  5 mg Intramuscular Q3H PRN Jessica Tapia, RENATANP      OLANZapine  10 mg Oral BID JARED Rivers      Pramox-PE-Glycerin-Petrolatum  1 application Rectal 4x Daily PRN Isis Lama, RENATANP      prazosin  1 mg Oral HS JARED Rivers         Risks / Benefits of Treatment:    Risks, benefits, and possible side effects of medications explained to patient and patient verbalizes understanding and agreement for treatment  Counseling / Coordination of Care:    Patient's progress discussed with staff in treatment team meeting  Medications, treatment progress and treatment plan reviewed with patient      Nhung Aly MD 04/20/21 Value   12/12/2023 104     Glucose (mg/dL)   Date Value   12/12/2023 219 (H)     Calcium (mg/dL)   Date Value   12/12/2023 8.7     Carbon Dioxide (mmol/L)   Date Value   12/12/2023 27     BUN (mg/dL)   Date Value   12/12/2023 16     Creatinine (mg/dL)   Date Value   12/12/2023 1.53 (H)        Assessment and Plan       CKD IIIA    -Etiology is most likey secondary to HTN/DM    -Baseline Cr 1.3--1.5 mg/dl    -Current Cr is 1.53 mg/dl, eGFR 49 ml/min/1.73m2     -CT abdomen 11/21: scattered renal cysts and renal calculi, no hydronephrosis.    -Patient was instructed to avoid NSAIDs or contrast    -Patient was educated about weight loss, control of DM and HTN to prevent progression of CKD     Hypertension   Orthostatic hypotension     - Target BP is < 130/80     - currently on Losartan 12.5 mg daily      - will hold Tamsulosin      - compression stockings      - counseled on taking care with position changes.      CKD Bone mineral disease      - dietary phosphorous restriciton to 900 mg/day     - PTH WNL     - vit D 30.5 ng/ml, cont OTC D3 2000 units daily     Chronic diastolic HF    -Patient has mild LE edema.    -not on diuretics due to Orthostatic dizziness.     Orders:   Orders Placed This Encounter    CBC with Automated Differential    Magnesium    Parathyroid Hormone Intact Without Calcium    Protein/Creatinine Ratio, Urine    Vitamin D -25 Hydroxy    Urinalysis & Reflex Microscopy With Culture If Indicated     Order Specific Question:   Indication:     Answer:   Symptomatic    Uric Acid    Renal Panel       Follow up:   Return in about 6 months (around 6/12/2024).    Nuris Andrade MD   Nephrology

## 2023-12-19 ENCOUNTER — APPOINTMENT (OUTPATIENT)
Dept: URGENT CARE | Facility: CLINIC | Age: 53
End: 2023-12-19
Payer: COMMERCIAL

## 2023-12-19 ENCOUNTER — APPOINTMENT (OUTPATIENT)
Dept: LAB | Facility: CLINIC | Age: 53
End: 2023-12-19
Payer: COMMERCIAL

## 2023-12-19 ENCOUNTER — OFFICE VISIT (OUTPATIENT)
Dept: URGENT CARE | Facility: CLINIC | Age: 53
End: 2023-12-19
Payer: COMMERCIAL

## 2023-12-19 VITALS
TEMPERATURE: 97.9 F | BODY MASS INDEX: 28.7 KG/M2 | WEIGHT: 146.2 LBS | OXYGEN SATURATION: 97 % | DIASTOLIC BLOOD PRESSURE: 76 MMHG | RESPIRATION RATE: 20 BRPM | HEIGHT: 60 IN | SYSTOLIC BLOOD PRESSURE: 127 MMHG | HEART RATE: 100 BPM

## 2023-12-19 DIAGNOSIS — Z76.0 MEDICINE REFILL: ICD-10-CM

## 2023-12-19 DIAGNOSIS — K59.00 CONSTIPATION, UNSPECIFIED CONSTIPATION TYPE: ICD-10-CM

## 2023-12-19 DIAGNOSIS — F20.0 PARANOID SCHIZOPHRENIA, SUBCHRONIC CONDITION (HCC): ICD-10-CM

## 2023-12-19 DIAGNOSIS — J45.21 MILD INTERMITTENT ASTHMATIC BRONCHITIS WITH ACUTE EXACERBATION: Primary | ICD-10-CM

## 2023-12-19 DIAGNOSIS — J02.9 SORE THROAT: ICD-10-CM

## 2023-12-19 LAB
BASOPHILS # BLD AUTO: 0.04 THOUSANDS/ÂΜL (ref 0–0.1)
BASOPHILS NFR BLD AUTO: 0 % (ref 0–1)
EOSINOPHIL # BLD AUTO: 0.28 THOUSAND/ÂΜL (ref 0–0.61)
EOSINOPHIL NFR BLD AUTO: 2 % (ref 0–6)
ERYTHROCYTE [DISTWIDTH] IN BLOOD BY AUTOMATED COUNT: 14.9 % (ref 11.6–15.1)
HCT VFR BLD AUTO: 41.2 % (ref 34.8–46.1)
HGB BLD-MCNC: 13.6 G/DL (ref 11.5–15.4)
IMM GRANULOCYTES # BLD AUTO: 0.09 THOUSAND/UL (ref 0–0.2)
IMM GRANULOCYTES NFR BLD AUTO: 1 % (ref 0–2)
LYMPHOCYTES # BLD AUTO: 2.38 THOUSANDS/ÂΜL (ref 0.6–4.47)
LYMPHOCYTES NFR BLD AUTO: 17 % (ref 14–44)
MCH RBC QN AUTO: 29.2 PG (ref 26.8–34.3)
MCHC RBC AUTO-ENTMCNC: 33 G/DL (ref 31.4–37.4)
MCV RBC AUTO: 89 FL (ref 82–98)
MONOCYTES # BLD AUTO: 0.58 THOUSAND/ÂΜL (ref 0.17–1.22)
MONOCYTES NFR BLD AUTO: 4 % (ref 4–12)
NEUTROPHILS # BLD AUTO: 10.64 THOUSANDS/ÂΜL (ref 1.85–7.62)
NEUTS SEG NFR BLD AUTO: 76 % (ref 43–75)
NRBC BLD AUTO-RTO: 0 /100 WBCS
PLATELET # BLD AUTO: 334 THOUSANDS/UL (ref 149–390)
PMV BLD AUTO: 9.4 FL (ref 8.9–12.7)
RBC # BLD AUTO: 4.65 MILLION/UL (ref 3.81–5.12)
S PYO AG THROAT QL: NEGATIVE
SARS-COV-2 AG UPPER RESP QL IA: NEGATIVE
VALID CONTROL: NORMAL
WBC # BLD AUTO: 14.01 THOUSAND/UL (ref 4.31–10.16)

## 2023-12-19 PROCEDURE — 87811 SARS-COV-2 COVID19 W/OPTIC: CPT | Performed by: NURSE PRACTITIONER

## 2023-12-19 PROCEDURE — 99213 OFFICE O/P EST LOW 20 MIN: CPT

## 2023-12-19 PROCEDURE — 87880 STREP A ASSAY W/OPTIC: CPT | Performed by: NURSE PRACTITIONER

## 2023-12-19 PROCEDURE — 85025 COMPLETE CBC W/AUTO DIFF WBC: CPT

## 2023-12-19 PROCEDURE — 36415 COLL VENOUS BLD VENIPUNCTURE: CPT

## 2023-12-19 RX ORDER — SORBITOL SOLUTION 70 %
SOLUTION, ORAL MISCELLANEOUS
Qty: 473 ML | Refills: 0 | Status: SHIPPED | OUTPATIENT
Start: 2023-12-19

## 2023-12-19 RX ORDER — PREDNISONE 20 MG/1
20 TABLET ORAL 2 TIMES DAILY WITH MEALS
Qty: 10 TABLET | Refills: 0 | Status: SHIPPED | OUTPATIENT
Start: 2023-12-19 | End: 2023-12-24

## 2023-12-19 RX ORDER — LEVOFLOXACIN 750 MG/1
750 TABLET, FILM COATED ORAL EVERY 24 HOURS
Qty: 5 TABLET | Refills: 0 | Status: SHIPPED | OUTPATIENT
Start: 2023-12-19 | End: 2023-12-24

## 2023-12-19 RX ORDER — RISPERIDONE 3 MG/1
TABLET ORAL
COMMUNITY
Start: 2023-11-17
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
## 2023-12-19 NOTE — PATIENT INSTRUCTIONS
Acute Bronchitis   AMBULATORY CARE:   Acute bronchitis  is swelling and irritation in your lungs. It is usually caused by a virus and most often happens in the winter. Bronchitis may also be caused by bacteria or by a chemical irritant, such as smoke.  Common symptoms:   Cough that lasts up to 3 weeks    Runny or stuffy nose    Hoarseness, sore throat    Fever    Feeling more tired than usual, and body aches    Wheezing or pain when you breathe or cough    Seek care immediately if:   You cough up blood.    Your lips or fingernails turn blue.    You feel like you are not getting enough air when you breathe.    Call your doctor if:   Your symptoms do not go away or get worse, even after treatment.    Your cough does not get better within 4 weeks.    You have questions or concerns about your condition or care.    Medicines:  You may need any of the following:  Cough suppressants  decrease your urge to cough.    Decongestants  help loosen mucus in your lungs and make it easier to cough up. This can help you breathe easier.    Inhalers  may be given. Your healthcare provider may give you one or more inhalers to help you breathe easier and cough less. An inhaler gives you medicine to open your airways. Ask your healthcare provider to show you how to use your inhaler correctly.         Antiviral medicine  treats infections caused by a virus.    Antibiotics  may be given if your bronchitis is caused by bacteria or if you have lung condition.    Acetaminophen  decreases pain and fever. It is available without a doctor's order. Ask how much to take and how often to take it. Follow directions. Read the labels of all other medicines you are using to see if they also contain acetaminophen, or ask your doctor or pharmacist. Acetaminophen can cause liver damage if not taken correctly.    NSAIDs  help decrease swelling and pain or fever. This medicine is available with or without a doctor's order. NSAIDs can cause stomach bleeding  or kidney problems in certain people. If you take blood thinner medicine, always ask your healthcare provider if NSAIDs are safe for you. Always read the medicine label and follow directions.    Self-care:   Drink liquids as directed.  You may need to drink more liquids than usual to stay hydrated. Ask how much liquid to drink each day and which liquids are best for you.    Use a cool mist humidifier.  This increases air moisture in your home. This may make it easier for you to breathe and help decrease your cough.     Get more rest.  Rest helps your body to heal. Slowly start to do more each day. Rest when you feel it is needed.    Prevent acute bronchitis:       Ask about vaccines you may need.  Get a flu vaccine each year as soon as recommended, usually in September or October. Ask your healthcare provider if you should also get a pneumonia or COVID-19 vaccine. Your healthcare provider can tell you if you should also get other vaccines, and when to get them.    Prevent the spread of germs.  You can decrease your risk for acute bronchitis and other illnesses by doing the following:    Wash your hands often with soap and water. Carry germ-killing hand lotion or gel with you. You can use the lotion or gel to clean your hands when soap and water are not available.         Do not touch your eyes, nose, or mouth unless you have washed your hands first.    Always cover your mouth when you cough to prevent the spread of germs. It is best to cough into a tissue or your shirt sleeve instead of into your hand. Ask those around you to cover their mouths when they cough.    Try to avoid people who have a cold or the flu. If you are sick, stay away from others as much as possible.    Avoid irritants in the air.  Avoid chemicals, fumes, and dust. Wear a face mask if you must work around dust or fumes. Stay inside on days when air pollution levels are high. If you have allergies, stay inside when pollen counts are high. Do not  use aerosol products, such as spray-on deodorant, bug spray, and hair spray.    Do not smoke or be around others who are smoking.  Nicotine and other chemicals in cigarettes and cigars can cause lung damage. Ask your healthcare provider for information if you currently smoke and need help to quit. E-cigarettes or smokeless tobacco still contain nicotine. Talk to your healthcare provider before you use these products.  Follow up with your doctor as directed:  Write down questions you have so you will remember to ask them during your follow-up visits.  © Copyright [employer] 2023 Information is for End User's use only and may not be sold, redistributed or otherwise used for commercial purposes.  The above information is an  only. It is not intended as medical advice for individual conditions or treatments. Talk to your doctor, nurse or pharmacist before following any medical regimen to see if it is safe and effective for you.

## 2023-12-22 ENCOUNTER — OFFICE VISIT (OUTPATIENT)
Dept: HEMATOLOGY ONCOLOGY | Facility: CLINIC | Age: 53
End: 2023-12-22
Payer: COMMERCIAL

## 2023-12-22 VITALS
HEART RATE: 105 BPM | BODY MASS INDEX: 29.33 KG/M2 | RESPIRATION RATE: 16 BRPM | TEMPERATURE: 96.6 F | SYSTOLIC BLOOD PRESSURE: 117 MMHG | DIASTOLIC BLOOD PRESSURE: 77 MMHG | HEIGHT: 60 IN | WEIGHT: 149.4 LBS

## 2023-12-22 DIAGNOSIS — D72.9 NEUTROPHILIA: Primary | ICD-10-CM

## 2023-12-22 PROBLEM — D72.828 NEUTROPHILIA: Status: ACTIVE | Noted: 2023-12-22

## 2023-12-22 PROCEDURE — 99214 OFFICE O/P EST MOD 30 MIN: CPT | Performed by: INTERNAL MEDICINE
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
## 2024-01-04 ENCOUNTER — OFFICE VISIT (OUTPATIENT)
Dept: FAMILY MEDICINE CLINIC | Facility: CLINIC | Age: 54
End: 2024-01-04
Payer: COMMERCIAL

## 2024-01-04 VITALS
OXYGEN SATURATION: 94 % | WEIGHT: 145.6 LBS | HEART RATE: 106 BPM | TEMPERATURE: 97.6 F | DIASTOLIC BLOOD PRESSURE: 100 MMHG | HEIGHT: 61 IN | SYSTOLIC BLOOD PRESSURE: 158 MMHG | BODY MASS INDEX: 27.49 KG/M2

## 2024-01-04 DIAGNOSIS — J40 BRONCHITIS: ICD-10-CM

## 2024-01-04 DIAGNOSIS — J45.30 MILD PERSISTENT ASTHMA WITHOUT COMPLICATION: Primary | ICD-10-CM

## 2024-01-04 DIAGNOSIS — I15.8 OTHER SECONDARY HYPERTENSION: ICD-10-CM

## 2024-01-04 DIAGNOSIS — E78.2 MIXED HYPERLIPIDEMIA: ICD-10-CM

## 2024-01-04 DIAGNOSIS — M51.36 DEGENERATIVE DISC DISEASE, LUMBAR: ICD-10-CM

## 2024-01-04 DIAGNOSIS — E55.9 VITAMIN D DEFICIENCY: ICD-10-CM

## 2024-01-04 DIAGNOSIS — R03.0 ELEVATED BLOOD PRESSURE READING: ICD-10-CM

## 2024-01-04 DIAGNOSIS — F17.210 SMOKING GREATER THAN 20 PACK YEARS: ICD-10-CM

## 2024-01-04 PROCEDURE — 99214 OFFICE O/P EST MOD 30 MIN: CPT | Performed by: FAMILY MEDICINE

## 2024-01-04 RX ORDER — ADHESIVE BANDAGE 3/4"
BANDAGE TOPICAL DAILY
Qty: 1 EACH | Refills: 0 | Status: SHIPPED | OUTPATIENT
Start: 2024-01-04

## 2024-01-04 RX ORDER — METHYLPREDNISOLONE 4 MG/1
TABLET ORAL
Qty: 21 EACH | Refills: 0 | Status: SHIPPED | OUTPATIENT
Start: 2024-01-04

## 2024-01-04 RX ORDER — AZITHROMYCIN 250 MG/1
TABLET, FILM COATED ORAL DAILY
Qty: 6 TABLET | Refills: 0 | Status: SHIPPED | OUTPATIENT
Start: 2024-01-04 | End: 2024-01-09

## 2024-01-04 RX ORDER — DEXTROMETHORPHAN HYDROBROMIDE AND PROMETHAZINE HYDROCHLORIDE 15; 6.25 MG/5ML; MG/5ML
5 SYRUP ORAL 4 TIMES DAILY PRN
Qty: 180 ML | Refills: 1 | Status: SHIPPED | OUTPATIENT
Start: 2024-01-04
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
## 2024-01-05 ENCOUNTER — TELEPHONE (OUTPATIENT)
Age: 54
End: 2024-01-05

## 2024-01-05 ENCOUNTER — TELEPHONE (OUTPATIENT)
Dept: FAMILY MEDICINE CLINIC | Facility: CLINIC | Age: 54
End: 2024-01-05

## 2024-01-05 DIAGNOSIS — M62.838 MUSCLE SPASM: ICD-10-CM

## 2024-01-05 NOTE — TELEPHONE ENCOUNTER
Caller: tyrone    Doctor/Office: edwin    Callback#: [phone]        Patient is requesting a transfer of care for the following reason: location        Doctor: edwin    Would you release patient from your care?      Doctor: sandra    Would you take patient on as a patient?        Please advise,   Thank you.

## 2024-01-05 NOTE — TELEPHONE ENCOUNTER
Jo-Ann Gandhi,     Patient just moved and she is asking for:  Lidocaine Patches 5%,     Patient is still waiting for an appt to see her new Spine and Pain Dr.    [facility]/pharmacy #[id number] - [place] - [address]   [address] [facility] [place]     CB: [phone]

## 2024-01-05 NOTE — TELEPHONE ENCOUNTER
FYI      Patient is looking for something for pain - states that she just ran out of her lidocaine 5% patch today - states that pain management is going to get back to her with an apt - advised her to contact her previous pain management provider for an apt - patient understood

## 2024-01-05 NOTE — TELEPHONE ENCOUNTER
Reason for call:   [x] Refill   [] Prior Auth  [] Other:     Office:   [] PCP/Provider -   [x] Specialty/Provider - S&P    Medication: ROBAXIN    Dose/Frequency: 500 MG    Quantity: 90    Pharmacy:   [facility]/pharmacy #[id number] - [place] - [address] [facility] [address] [facility]   [address] [facility] [place]     Does the patient have enough for 3 days?   [x] Yes   [] No - Send as HP to POD

## 2024-01-09 ENCOUNTER — TELEPHONE (OUTPATIENT)
Dept: FAMILY MEDICINE CLINIC | Facility: CLINIC | Age: 54
End: 2024-01-09

## 2024-01-09 DIAGNOSIS — E53.8 VITAMIN B 12 DEFICIENCY: ICD-10-CM

## 2024-01-09 DIAGNOSIS — N92.6 MENSTRUAL IRREGULARITY: Primary | ICD-10-CM

## 2024-01-09 NOTE — TELEPHONE ENCOUNTER
Patient called; requesting additional labs for a Blood Pregnancy test (patient specifically asked for blood, not urine), and a B12 Vitamin level.

## 2024-01-09 NOTE — TELEPHONE ENCOUNTER
Patient asked about med refill explained Dr. Gandhi said no.     I scheduled 1/11 with Dr. Prakash PERDOMO approved

## 2024-01-10 ENCOUNTER — TELEPHONE (OUTPATIENT)
Dept: FAMILY MEDICINE CLINIC | Facility: CLINIC | Age: 54
End: 2024-01-10

## 2024-01-10 DIAGNOSIS — J40 BRONCHITIS: ICD-10-CM

## 2024-01-10 RX ORDER — DEXTROMETHORPHAN HYDROBROMIDE AND PROMETHAZINE HYDROCHLORIDE 15; 6.25 MG/5ML; MG/5ML
5 SYRUP ORAL 4 TIMES DAILY PRN
Qty: 180 ML | Refills: 1 | Status: SHIPPED | OUTPATIENT
Start: 2024-01-10

## 2024-01-10 RX ORDER — METHOCARBAMOL 500 MG/1
500 TABLET, FILM COATED ORAL EVERY 6 HOURS PRN
Qty: 90 TABLET | Refills: 0 | OUTPATIENT
Start: 2024-01-10 | End: 2024-02-09

## 2024-01-10 RX ORDER — METHYLPREDNISOLONE 4 MG/1
TABLET ORAL
Qty: 21 EACH | Refills: 0 | Status: SHIPPED | OUTPATIENT
Start: 2024-01-10 | End: 2024-01-23 | Stop reason: SDUPTHER

## 2024-01-10 RX ORDER — DOXYCYCLINE HYCLATE 100 MG
100 TABLET ORAL 2 TIMES DAILY
Qty: 28 TABLET | Refills: 0 | Status: SHIPPED | OUTPATIENT
Start: 2024-01-10 | End: 2024-01-24
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
## 2024-01-10 NOTE — TELEPHONE ENCOUNTER
Patient is aware   Itraconazole Counseling:  I discussed with the patient the risks of itraconazole including but not limited to liver damage, nausea/vomiting, neuropathy, and severe allergy.  The patient understands that this medication is best absorbed when taken with acidic beverages such as non-diet cola or ginger ale.  The patient understands that monitoring is required including baseline LFTs and repeat LFTs at intervals.  The patient understands that they are to contact us or the primary physician if concerning signs are noted.

## 2024-01-10 NOTE — TELEPHONE ENCOUNTER
Medrol has been reordered doxycycline is the antibiotic twice a day for 2 weeks.  And Promethazine DM has been reordered

## 2024-01-11 ENCOUNTER — OFFICE VISIT (OUTPATIENT)
Dept: PAIN MEDICINE | Facility: CLINIC | Age: 54
End: 2024-01-11
Payer: COMMERCIAL

## 2024-01-11 VITALS
WEIGHT: 145 LBS | DIASTOLIC BLOOD PRESSURE: 88 MMHG | HEIGHT: 60 IN | SYSTOLIC BLOOD PRESSURE: 118 MMHG | HEART RATE: 97 BPM | BODY MASS INDEX: 28.47 KG/M2

## 2024-01-11 DIAGNOSIS — M53.3 PAIN OF BOTH SACROILIAC JOINTS: Primary | ICD-10-CM

## 2024-01-11 DIAGNOSIS — G89.4 CHRONIC PAIN SYNDROME: ICD-10-CM

## 2024-01-11 PROCEDURE — 99214 OFFICE O/P EST MOD 30 MIN: CPT | Performed by: STUDENT IN AN ORGANIZED HEALTH CARE EDUCATION/TRAINING PROGRAM
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
## 2024-01-12 ENCOUNTER — TELEPHONE (OUTPATIENT)
Dept: FAMILY MEDICINE CLINIC | Facility: CLINIC | Age: 54
End: 2024-01-12

## 2024-01-12 ENCOUNTER — APPOINTMENT (OUTPATIENT)
Dept: LAB | Facility: CLINIC | Age: 54
End: 2024-01-12
Payer: COMMERCIAL

## 2024-01-12 DIAGNOSIS — I15.8 OTHER SECONDARY HYPERTENSION: ICD-10-CM

## 2024-01-12 DIAGNOSIS — N92.6 MENSTRUAL IRREGULARITY: ICD-10-CM

## 2024-01-12 DIAGNOSIS — M51.36 DEGENERATIVE DISC DISEASE, LUMBAR: Primary | ICD-10-CM

## 2024-01-12 DIAGNOSIS — E55.9 VITAMIN D DEFICIENCY: ICD-10-CM

## 2024-01-12 DIAGNOSIS — E78.2 MIXED HYPERLIPIDEMIA: ICD-10-CM

## 2024-01-12 DIAGNOSIS — E53.8 VITAMIN B 12 DEFICIENCY: ICD-10-CM

## 2024-01-12 DIAGNOSIS — J40 BRONCHITIS: ICD-10-CM

## 2024-01-12 PROCEDURE — 36415 COLL VENOUS BLD VENIPUNCTURE: CPT

## 2024-01-12 PROCEDURE — 82607 VITAMIN B-12: CPT

## 2024-01-12 PROCEDURE — 84703 CHORIONIC GONADOTROPIN ASSAY: CPT

## 2024-01-12 RX ORDER — LIDOCAINE 50 MG/G
1 PATCH TOPICAL DAILY
Qty: 30 PATCH | Refills: 1 | Status: SHIPPED | OUTPATIENT
Start: 2024-01-12 | End: 2024-01-12

## 2024-01-12 RX ORDER — LIDOCAINE 50 MG/G
1 PATCH TOPICAL DAILY
Qty: 30 PATCH | Refills: 0 | Status: SHIPPED | OUTPATIENT
Start: 2024-01-12

## 2024-01-12 NOTE — TELEPHONE ENCOUNTER
Patient has an apt with Dr. Martínez - ERON Reeder - (f) [phone] - 2/14/24 @ 10:00am     Looking for something for pain until apt - did see [facility] yesterday - not sure why she is going to a different place     Patient states that she had Lidocaine 5% patch in past - she is requesting the same - advised that it will not be covered by her insurance - she states that it is the only thing that her insurance will pay     It looks like her last pain injection was 1/9/23 - and the last lidocaine patch was ordered 6/2/23

## 2024-01-13 LAB
HCG SERPL QL: NEGATIVE
VIT B12 SERPL-MCNC: 369 PG/ML (ref 180–914)

## 2024-01-16 ENCOUNTER — TELEPHONE (OUTPATIENT)
Dept: PAIN MEDICINE | Facility: CLINIC | Age: 54
End: 2024-01-16

## 2024-01-17 ENCOUNTER — TELEPHONE (OUTPATIENT)
Dept: PAIN MEDICINE | Facility: CLINIC | Age: 54
End: 2024-01-17

## 2024-01-17 ENCOUNTER — APPOINTMENT (OUTPATIENT)
Dept: LAB | Facility: CLINIC | Age: 54
End: 2024-01-17
Payer: COMMERCIAL

## 2024-01-17 DIAGNOSIS — F20.0 PARANOID SCHIZOPHRENIA, SUBCHRONIC CONDITION (HCC): ICD-10-CM

## 2024-01-17 LAB
BASOPHILS # BLD AUTO: 0.09 THOUSANDS/ÂΜL (ref 0–0.1)
BASOPHILS NFR BLD AUTO: 1 % (ref 0–1)
EOSINOPHIL # BLD AUTO: 0.18 THOUSAND/ÂΜL (ref 0–0.61)
EOSINOPHIL NFR BLD AUTO: 1 % (ref 0–6)
ERYTHROCYTE [DISTWIDTH] IN BLOOD BY AUTOMATED COUNT: 15.3 % (ref 11.6–15.1)
HCT VFR BLD AUTO: 43.9 % (ref 34.8–46.1)
HGB BLD-MCNC: 14.1 G/DL (ref 11.5–15.4)
IMM GRANULOCYTES # BLD AUTO: 0.15 THOUSAND/UL (ref 0–0.2)
IMM GRANULOCYTES NFR BLD AUTO: 1 % (ref 0–2)
LYMPHOCYTES # BLD AUTO: 3.44 THOUSANDS/ÂΜL (ref 0.6–4.47)
LYMPHOCYTES NFR BLD AUTO: 24 % (ref 14–44)
MCH RBC QN AUTO: 28.7 PG (ref 26.8–34.3)
MCHC RBC AUTO-ENTMCNC: 32.1 G/DL (ref 31.4–37.4)
MCV RBC AUTO: 89 FL (ref 82–98)
MONOCYTES # BLD AUTO: 0.62 THOUSAND/ÂΜL (ref 0.17–1.22)
MONOCYTES NFR BLD AUTO: 4 % (ref 4–12)
NEUTROPHILS # BLD AUTO: 9.99 THOUSANDS/ÂΜL (ref 1.85–7.62)
NEUTS SEG NFR BLD AUTO: 69 % (ref 43–75)
NRBC BLD AUTO-RTO: 0 /100 WBCS
PLATELET # BLD AUTO: 379 THOUSANDS/UL (ref 149–390)
PMV BLD AUTO: 9.2 FL (ref 8.9–12.7)
RBC # BLD AUTO: 4.91 MILLION/UL (ref 3.81–5.12)
WBC # BLD AUTO: 14.47 THOUSAND/UL (ref 4.31–10.16)

## 2024-01-17 PROCEDURE — 36415 COLL VENOUS BLD VENIPUNCTURE: CPT

## 2024-01-17 PROCEDURE — 85025 COMPLETE CBC W/AUTO DIFF WBC: CPT

## 2024-01-18 ENCOUNTER — TELEPHONE (OUTPATIENT)
Dept: PAIN MEDICINE | Facility: CLINIC | Age: 54
End: 2024-01-18

## 2024-01-18 DIAGNOSIS — M53.3 SACROILIAC JOINT PAIN: Primary | ICD-10-CM

## 2024-01-18 NOTE — TELEPHONE ENCOUNTER
LMOM to call back. Callback # provided    Pt seen 1/11 with Dr Randall, has trialed and failed a few meds per note. Appears other providers have prescribed diclofenac gel,Robaxin, Medrol dose pack 1/10 and lidodrerm patch

## 2024-01-18 NOTE — TELEPHONE ENCOUNTER
S/W pt. She uses the lidoderm patch, occasionally the Robaxin but it really doesn't help her pain. Also admits to using heat and excedrin which only gives her 1.5 hours of relief  Injection appt is scheduled for 2/21/24  Please advise any other recommendations

## 2024-01-18 NOTE — TELEPHONE ENCOUNTER
I spoke to patient to schedule procedure. She shared with me,she is in a lot of pain. She wanted to know if there is anything else Dr can give her for the pain.

## 2024-01-18 NOTE — TELEPHONE ENCOUNTER
Caller: Patient    Doctor: Prakash      Reason for call: Returning call. Warm transferred to [facility]    Call back#: [phone]

## 2024-01-19 NOTE — TELEPHONE ENCOUNTER
"Fyi...    S/w pt and advised of same. Pt verbalized understanding and states she will be seeing a new pain provider and no longer wants to have inj. Pt states \"I can't handle pain and needles and really do not want the injection or any injections.\" RN advised pt we will cx her upcoming inj and that if she reconsiders to c/b. Pt verbalized understanding and ended call.     Sched--> pls cx inj sched for 2/21/24  "

## 2024-01-23 ENCOUNTER — TELEPHONE (OUTPATIENT)
Dept: FAMILY MEDICINE CLINIC | Facility: CLINIC | Age: 54
End: 2024-01-23

## 2024-01-23 DIAGNOSIS — J40 BRONCHITIS: ICD-10-CM

## 2024-01-23 RX ORDER — METHYLPREDNISOLONE 4 MG/1
TABLET ORAL
Qty: 21 EACH | Refills: 0 | Status: SHIPPED | OUTPATIENT
Start: 2024-01-23 | End: 2024-01-30 | Stop reason: SDUPTHER

## 2024-01-23 NOTE — TELEPHONE ENCOUNTER
Patient is requesting a medrol refill to pharmacy due to her not getting in to pain management until 2/14 if possible

## 2024-01-25 DIAGNOSIS — J45.21 MILD INTERMITTENT ASTHMATIC BRONCHITIS WITH ACUTE EXACERBATION: ICD-10-CM

## 2024-01-25 RX ORDER — ALBUTEROL SULFATE 2.5 MG/3ML
2.5 SOLUTION RESPIRATORY (INHALATION) EVERY 6 HOURS PRN
Qty: 180 ML | Refills: 4 | Status: SHIPPED | OUTPATIENT
Start: 2024-01-25

## 2024-01-25 RX ORDER — ALBUTEROL SULFATE 90 UG/1
2 AEROSOL, METERED RESPIRATORY (INHALATION) EVERY 4 HOURS PRN
Qty: 18 G | Refills: 5 | Status: SHIPPED | OUTPATIENT
Start: 2024-01-25

## 2024-01-25 NOTE — TELEPHONE ENCOUNTER
Patient is requesting prescriptions be sent to [facility].     Flonase  Albuterol Inhaler  Nebulizer solution    She is also asking for something like Nexium for acid reflux.      Thank you

## 2024-01-30 ENCOUNTER — TELEPHONE (OUTPATIENT)
Dept: FAMILY MEDICINE CLINIC | Facility: CLINIC | Age: 54
End: 2024-01-30

## 2024-01-30 DIAGNOSIS — J40 BRONCHITIS: ICD-10-CM

## 2024-01-30 RX ORDER — METHYLPREDNISOLONE 4 MG/1
TABLET ORAL
Qty: 21 EACH | Refills: 0 | Status: SHIPPED | OUTPATIENT
Start: 2024-01-30 | End: 2024-02-08 | Stop reason: SDUPTHER

## 2024-01-30 NOTE — TELEPHONE ENCOUNTER
Hi this is Jo-Ann Wang. My birthday is [birth year]. I was wondering if I could get the Prednisone pack again on my pain management doctors on February 14th. He gave me one and it's it was the last day and my back is really bad. Like I did some cleaning around the house over the weekend and this morning and my back is just out. Like I took a muscle relaxer and a lidocaine packed and it still hurts really bad. Thank you. Tom.

## 2024-02-04 ENCOUNTER — HOSPITAL ENCOUNTER (EMERGENCY)
Facility: HOSPITAL | Age: 54
Discharge: HOME | End: 2024-02-04
Attending: FAMILY MEDICINE
Payer: COMMERCIAL

## 2024-02-04 VITALS
TEMPERATURE: 97 F | RESPIRATION RATE: 20 BRPM | SYSTOLIC BLOOD PRESSURE: 146 MMHG | OXYGEN SATURATION: 95 % | HEART RATE: 92 BPM | DIASTOLIC BLOOD PRESSURE: 90 MMHG

## 2024-02-04 DIAGNOSIS — G89.29 CHRONIC PAIN: ICD-10-CM

## 2024-02-04 DIAGNOSIS — M54.9 BACK PAIN: Primary | ICD-10-CM

## 2024-02-04 DIAGNOSIS — M51.36 DEGENERATIVE DISC DISEASE, LUMBAR: ICD-10-CM

## 2024-02-04 DIAGNOSIS — M79.10 MYALGIA: ICD-10-CM

## 2024-02-04 LAB
ALBUMIN SERPL BCP-MCNC: 4.3 G/DL (ref 3.5–5)
ALP SERPL-CCNC: 65 U/L (ref 34–104)
ALT SERPL W P-5'-P-CCNC: 15 U/L (ref 7–52)
ANION GAP SERPL CALCULATED.3IONS-SCNC: 9 MMOL/L
APTT PPP: 25 SECONDS (ref 23–37)
AST SERPL W P-5'-P-CCNC: 17 U/L (ref 13–39)
BASOPHILS # BLD AUTO: 0.08 THOUSANDS/ÂΜL (ref 0–0.1)
BASOPHILS NFR BLD AUTO: 1 % (ref 0–1)
BILIRUB SERPL-MCNC: 0.24 MG/DL (ref 0.2–1)
BILIRUB UR QL STRIP: NEGATIVE
BUN SERPL-MCNC: 7 MG/DL (ref 5–25)
CALCIUM SERPL-MCNC: 9.5 MG/DL (ref 8.4–10.2)
CHLORIDE SERPL-SCNC: 96 MMOL/L (ref 96–108)
CLARITY UR: CLEAR
CO2 SERPL-SCNC: 31 MMOL/L (ref 21–32)
COLOR UR: YELLOW
CREAT SERPL-MCNC: 0.8 MG/DL (ref 0.6–1.3)
EOSINOPHIL # BLD AUTO: 0.21 THOUSAND/ÂΜL (ref 0–0.61)
EOSINOPHIL NFR BLD AUTO: 1 % (ref 0–6)
ERYTHROCYTE [DISTWIDTH] IN BLOOD BY AUTOMATED COUNT: 14.6 % (ref 11.6–15.1)
GFR SERPL CREATININE-BSD FRML MDRD: 85 ML/MIN/1.73SQ M
GLUCOSE SERPL-MCNC: 156 MG/DL (ref 65–140)
GLUCOSE SERPL-MCNC: 176 MG/DL (ref 65–140)
GLUCOSE UR STRIP-MCNC: NEGATIVE MG/DL
HCT VFR BLD AUTO: 46.3 % (ref 34.8–46.1)
HGB BLD-MCNC: 14.8 G/DL (ref 11.5–15.4)
HGB UR QL STRIP.AUTO: NEGATIVE
IMM GRANULOCYTES # BLD AUTO: 0.33 THOUSAND/UL (ref 0–0.2)
IMM GRANULOCYTES NFR BLD AUTO: 2 % (ref 0–2)
INR PPP: 0.8 (ref 0.84–1.19)
KETONES UR STRIP-MCNC: NEGATIVE MG/DL
LEUKOCYTE ESTERASE UR QL STRIP: NEGATIVE
LYMPHOCYTES # BLD AUTO: 2.74 THOUSANDS/ÂΜL (ref 0.6–4.47)
LYMPHOCYTES NFR BLD AUTO: 19 % (ref 14–44)
MAGNESIUM SERPL-MCNC: 1.6 MG/DL (ref 1.9–2.7)
MCH RBC QN AUTO: 29.4 PG (ref 26.8–34.3)
MCHC RBC AUTO-ENTMCNC: 32 G/DL (ref 31.4–37.4)
MCV RBC AUTO: 92 FL (ref 82–98)
MONOCYTES # BLD AUTO: 0.81 THOUSAND/ÂΜL (ref 0.17–1.22)
MONOCYTES NFR BLD AUTO: 6 % (ref 4–12)
NEUTROPHILS # BLD AUTO: 10.58 THOUSANDS/ÂΜL (ref 1.85–7.62)
NEUTS SEG NFR BLD AUTO: 71 % (ref 43–75)
NITRITE UR QL STRIP: NEGATIVE
NRBC BLD AUTO-RTO: 0 /100 WBCS
PH UR STRIP.AUTO: 6.5 [PH]
PLATELET # BLD AUTO: 311 THOUSANDS/UL (ref 149–390)
PMV BLD AUTO: 8.8 FL (ref 8.9–12.7)
POTASSIUM SERPL-SCNC: 3.5 MMOL/L (ref 3.5–5.3)
PROT SERPL-MCNC: 7.1 G/DL (ref 6.4–8.4)
PROT UR STRIP-MCNC: NEGATIVE MG/DL
PROTHROMBIN TIME: 11.2 SECONDS (ref 11.6–14.5)
RBC # BLD AUTO: 5.04 MILLION/UL (ref 3.81–5.12)
SODIUM SERPL-SCNC: 136 MMOL/L (ref 135–147)
SP GR UR STRIP.AUTO: <=1.005
UROBILINOGEN UR QL STRIP.AUTO: 0.2 E.U./DL
WBC # BLD AUTO: 14.75 THOUSAND/UL (ref 4.31–10.16)

## 2024-02-04 PROCEDURE — 85730 THROMBOPLASTIN TIME PARTIAL: CPT | Performed by: PHYSICIAN ASSISTANT

## 2024-02-04 PROCEDURE — 96361 HYDRATE IV INFUSION ADD-ON: CPT

## 2024-02-04 PROCEDURE — 96365 THER/PROPH/DIAG IV INF INIT: CPT

## 2024-02-04 PROCEDURE — 85610 PROTHROMBIN TIME: CPT | Performed by: PHYSICIAN ASSISTANT

## 2024-02-04 PROCEDURE — 85025 COMPLETE CBC W/AUTO DIFF WBC: CPT | Performed by: PHYSICIAN ASSISTANT

## 2024-02-04 PROCEDURE — 80053 COMPREHEN METABOLIC PANEL: CPT | Performed by: PHYSICIAN ASSISTANT

## 2024-02-04 PROCEDURE — 82948 REAGENT STRIP/BLOOD GLUCOSE: CPT

## 2024-02-04 PROCEDURE — 99283 EMERGENCY DEPT VISIT LOW MDM: CPT

## 2024-02-04 PROCEDURE — 36415 COLL VENOUS BLD VENIPUNCTURE: CPT | Performed by: PHYSICIAN ASSISTANT

## 2024-02-04 PROCEDURE — 99284 EMERGENCY DEPT VISIT MOD MDM: CPT | Performed by: FAMILY MEDICINE

## 2024-02-04 PROCEDURE — 83735 ASSAY OF MAGNESIUM: CPT | Performed by: PHYSICIAN ASSISTANT

## 2024-02-04 PROCEDURE — 81003 URINALYSIS AUTO W/O SCOPE: CPT | Performed by: PHYSICIAN ASSISTANT

## 2024-02-04 RX ORDER — METHOCARBAMOL 500 MG/1
500 TABLET, FILM COATED ORAL 2 TIMES DAILY
Qty: 20 TABLET | Refills: 0 | Status: SHIPPED | OUTPATIENT
Start: 2024-02-04

## 2024-02-04 RX ORDER — OXYCODONE HYDROCHLORIDE 5 MG/1
5 TABLET ORAL ONCE
Status: COMPLETED | OUTPATIENT
Start: 2024-02-04 | End: 2024-02-04

## 2024-02-04 RX ORDER — MAGNESIUM SULFATE HEPTAHYDRATE 40 MG/ML
2 INJECTION, SOLUTION INTRAVENOUS ONCE
Status: COMPLETED | OUTPATIENT
Start: 2024-02-04 | End: 2024-02-04

## 2024-02-04 RX ORDER — GABAPENTIN 300 MG/1
300 CAPSULE ORAL 3 TIMES DAILY
Qty: 15 CAPSULE | Refills: 0 | Status: SHIPPED | OUTPATIENT
Start: 2024-02-04 | End: 2024-02-08 | Stop reason: SDUPTHER

## 2024-02-04 RX ADMIN — MAGNESIUM SULFATE HEPTAHYDRATE 2 G: 40 INJECTION, SOLUTION INTRAVENOUS at 09:07

## 2024-02-04 RX ADMIN — OXYCODONE HYDROCHLORIDE 5 MG: 5 TABLET ORAL at 08:49

## 2024-02-04 RX ADMIN — SODIUM CHLORIDE 500 ML: 0.9 INJECTION, SOLUTION INTRAVENOUS at 08:30
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
## 2024-02-05 ENCOUNTER — HOSPITAL ENCOUNTER (EMERGENCY)
Facility: HOSPITAL | Age: 54
Discharge: HOME | End: 2024-02-05
Attending: EMERGENCY MEDICINE
Payer: COMMERCIAL

## 2024-02-05 VITALS
OXYGEN SATURATION: 98 % | DIASTOLIC BLOOD PRESSURE: 79 MMHG | RESPIRATION RATE: 20 BRPM | HEART RATE: 112 BPM | TEMPERATURE: 97.4 F | SYSTOLIC BLOOD PRESSURE: 120 MMHG

## 2024-02-05 DIAGNOSIS — M54.9 BACK PAIN: Primary | ICD-10-CM

## 2024-02-05 DIAGNOSIS — G89.29 CHRONIC PAIN: ICD-10-CM

## 2024-02-05 DIAGNOSIS — M79.10 MYALGIA: ICD-10-CM

## 2024-02-05 PROCEDURE — 99282 EMERGENCY DEPT VISIT SF MDM: CPT

## 2024-02-05 PROCEDURE — 99284 EMERGENCY DEPT VISIT MOD MDM: CPT

## 2024-02-05 RX ORDER — LIDOCAINE 50 MG/G
1 PATCH TOPICAL DAILY
Qty: 30 PATCH | Refills: 0 | Status: SHIPPED | OUTPATIENT
Start: 2024-02-05

## 2024-02-05 RX ORDER — DIAZEPAM 5 MG/1
5 TABLET ORAL ONCE
Status: COMPLETED | OUTPATIENT
Start: 2024-02-05 | End: 2024-02-05

## 2024-02-05 RX ORDER — MELOXICAM 15 MG/1
15 TABLET ORAL DAILY
Qty: 15 TABLET | Refills: 0 | Status: SHIPPED | OUTPATIENT
Start: 2024-02-05 | End: 2024-02-08 | Stop reason: SDUPTHER

## 2024-02-05 RX ADMIN — DIAZEPAM 5 MG: 5 TABLET ORAL at 10:14

## 2024-02-05 NOTE — DISCHARGE INSTRUCTIONS
Return to the emergency room immediately if there are any new or worsening symptoms or if the symptoms are lasting longer than expected.      the Mobic from your pharmacy and follow-up with pain management.
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
## 2024-02-08 ENCOUNTER — OFFICE VISIT (OUTPATIENT)
Dept: FAMILY MEDICINE CLINIC | Facility: CLINIC | Age: 54
End: 2024-02-08
Payer: COMMERCIAL

## 2024-02-08 VITALS
SYSTOLIC BLOOD PRESSURE: 142 MMHG | HEART RATE: 92 BPM | WEIGHT: 148 LBS | RESPIRATION RATE: 20 BRPM | TEMPERATURE: 97 F | DIASTOLIC BLOOD PRESSURE: 80 MMHG | BODY MASS INDEX: 29.06 KG/M2 | HEIGHT: 60 IN | OXYGEN SATURATION: 96 %

## 2024-02-08 DIAGNOSIS — K21.9 GASTROESOPHAGEAL REFLUX DISEASE WITHOUT ESOPHAGITIS: Chronic | ICD-10-CM

## 2024-02-08 DIAGNOSIS — K59.00 CONSTIPATION, UNSPECIFIED CONSTIPATION TYPE: ICD-10-CM

## 2024-02-08 DIAGNOSIS — M54.9 BACK PAIN: Primary | ICD-10-CM

## 2024-02-08 DIAGNOSIS — J45.30 MILD PERSISTENT ASTHMA WITHOUT COMPLICATION: ICD-10-CM

## 2024-02-08 DIAGNOSIS — Z76.0 MEDICINE REFILL: ICD-10-CM

## 2024-02-08 DIAGNOSIS — M79.10 MYALGIA: ICD-10-CM

## 2024-02-08 DIAGNOSIS — J43.9 PULMONARY EMPHYSEMA, UNSPECIFIED EMPHYSEMA TYPE (HCC): ICD-10-CM

## 2024-02-08 DIAGNOSIS — F25.0 SCHIZOAFFECTIVE DISORDER, BIPOLAR TYPE (HCC): Chronic | ICD-10-CM

## 2024-02-08 DIAGNOSIS — G89.29 CHRONIC PAIN: ICD-10-CM

## 2024-02-08 PROCEDURE — 99214 OFFICE O/P EST MOD 30 MIN: CPT | Performed by: FAMILY MEDICINE

## 2024-02-08 RX ORDER — SORBITOL SOLUTION 70 %
SOLUTION, ORAL MISCELLANEOUS
Qty: 473 ML | Refills: 0 | OUTPATIENT
Start: 2024-02-08

## 2024-02-08 RX ORDER — METHYLPREDNISOLONE 4 MG/1
TABLET ORAL
Qty: 21 EACH | Refills: 0 | Status: SHIPPED | OUTPATIENT
Start: 2024-02-08

## 2024-02-08 RX ORDER — SORBITOL SOLUTION 70 %
SOLUTION, ORAL MISCELLANEOUS
Qty: 473 ML | Refills: 0 | Status: SHIPPED | OUTPATIENT
Start: 2024-02-08 | End: 2024-02-08 | Stop reason: CLARIF

## 2024-02-08 RX ORDER — MELOXICAM 15 MG/1
15 TABLET ORAL DAILY
Qty: 15 TABLET | Refills: 0 | Status: SHIPPED | OUTPATIENT
Start: 2024-02-08

## 2024-02-08 RX ORDER — GABAPENTIN 300 MG/1
300 CAPSULE ORAL 3 TIMES DAILY
Qty: 15 CAPSULE | Refills: 0 | Status: SHIPPED | OUTPATIENT
Start: 2024-02-08

## 2024-02-08 RX ORDER — LACTULOSE 10 G/15ML
20 SOLUTION ORAL DAILY
Qty: 946 ML | Refills: 5 | Status: SHIPPED | OUTPATIENT
Start: 2024-02-08
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
## 2024-02-12 NOTE — NURSING NOTE
Called mom and left a message to call me back.   Rhina Zuleta has been out and about on the unit  At times she will rest in bed  Stated to this writer, "Why do they keep bothering me? I'm going to talk to the doctor tomorrow"  When asked who she was speaking about she stated, "I don't know who it is"  No angry outbursts  Behavior controlled  Ate 100% of her meal  Took medication with reminding  Went out on the deck for fresh air with peers and staff member  Approached nurses station and requested a prn for "hearing voices"  Then decided to try coping skills and will return if medication is needed  One very brief episode of yelling out while in her room  Took HS medication  Continue to monitor  Precautions maintained

## 2024-02-13 NOTE — ED NOTES
Patient is accepted at [facility]  Patient is accepted by Dr Van Jones ([facility]) per [facility]      Patient may go to the floor once RN report is received  Unit RN will call ED RN for nurse report  prior to patient transfer  no

## 2024-02-16 ENCOUNTER — APPOINTMENT (OUTPATIENT)
Dept: LAB | Facility: CLINIC | Age: 54
End: 2024-02-16
Payer: COMMERCIAL

## 2024-02-23 ENCOUNTER — TELEPHONE (OUTPATIENT)
Dept: FAMILY MEDICINE CLINIC | Facility: CLINIC | Age: 54
End: 2024-02-23

## 2024-02-23 DIAGNOSIS — J40 BRONCHITIS: ICD-10-CM

## 2024-02-23 DIAGNOSIS — M54.9 BACK PAIN: ICD-10-CM

## 2024-02-23 RX ORDER — AZITHROMYCIN 250 MG/1
TABLET, FILM COATED ORAL DAILY
Qty: 6 TABLET | Refills: 0 | Status: SHIPPED | OUTPATIENT
Start: 2024-02-23 | End: 2024-02-28

## 2024-02-23 RX ORDER — DEXTROMETHORPHAN HYDROBROMIDE AND PROMETHAZINE HYDROCHLORIDE 15; 6.25 MG/5ML; MG/5ML
5 SYRUP ORAL 4 TIMES DAILY PRN
Qty: 180 ML | Refills: 1 | Status: SHIPPED | OUTPATIENT
Start: 2024-02-23

## 2024-02-23 RX ORDER — METHYLPREDNISOLONE 4 MG/1
TABLET ORAL
Qty: 21 EACH | Refills: 0 | Status: SHIPPED | OUTPATIENT
Start: 2024-02-23

## 2024-02-23 NOTE — TELEPHONE ENCOUNTER
Patient called; was given a medrol dose pack at her 02/08/2024 OV for a cough. Patient states felt better while on the medication, but states the cough returned once the medication was finished. C/o cough with green mucous. [facility]. Please advise.

## 2024-02-26 ENCOUNTER — TELEPHONE (OUTPATIENT)
Dept: FAMILY MEDICINE CLINIC | Facility: CLINIC | Age: 54
End: 2024-02-26

## 2024-02-26 DIAGNOSIS — N92.6 MENSTRUAL IRREGULARITY: ICD-10-CM

## 2024-02-26 DIAGNOSIS — Z12.4 CERVICAL CANCER SCREENING: Primary | ICD-10-CM

## 2024-02-26 NOTE — TELEPHONE ENCOUNTER
Patient called; requested another pregnancy test and stated it has to be blood. Reminded patient that she recently had a pregnancy test and advised they are generally urine tests. Patient insistent it has to be blood.   Advised would leave a message for the Provider.     Would patient be better suited with a referral to GYN? Looks like attempt was made by [facility] on 01/12/2024 per telephone encounter

## 2024-02-29 DIAGNOSIS — M54.9 BACK PAIN: ICD-10-CM

## 2024-02-29 RX ORDER — MELOXICAM 15 MG/1
15 TABLET ORAL DAILY
Qty: 15 TABLET | Refills: 0 | OUTPATIENT
Start: 2024-02-29

## 2024-02-29 NOTE — TELEPHONE ENCOUNTER
Per [facility]ist note : will deny refill at this time        Medications tried:     Opioids: Patient was prescribed Percocet at the onset of symptoms. No recent opioid prescriptions.    Gabapentin/Lyrica: Patient is currently prescribed gabapentin which provides some relief.    NSAID: Patient is taking meloxicam without relief.    Acetaminophen: No relief with acetaminophen.    Muscle relaxant: Minimal relief with Robaxin. No relief with Flexeril and tizanidine.    Steroid: No recent courses of corticosteroids.    Topicals: Some relief with topical diclofenac and lidocaine patches.    SNRI/TCA: Currently prescribed venlafaxine.    Diagnostic tests: No pertinent diagnostic imaging available for review.

## 2024-03-04 ENCOUNTER — OFFICE VISIT (OUTPATIENT)
Dept: FAMILY MEDICINE CLINIC | Facility: CLINIC | Age: 54
End: 2024-03-04
Payer: COMMERCIAL

## 2024-03-04 VITALS
DIASTOLIC BLOOD PRESSURE: 84 MMHG | HEART RATE: 84 BPM | BODY MASS INDEX: 27.88 KG/M2 | RESPIRATION RATE: 20 BRPM | TEMPERATURE: 97.9 F | WEIGHT: 142 LBS | HEIGHT: 60 IN | SYSTOLIC BLOOD PRESSURE: 126 MMHG

## 2024-03-04 DIAGNOSIS — F25.0 SCHIZOAFFECTIVE DISORDER, BIPOLAR TYPE (HCC): ICD-10-CM

## 2024-03-04 DIAGNOSIS — K21.9 GASTROESOPHAGEAL REFLUX DISEASE WITHOUT ESOPHAGITIS: ICD-10-CM

## 2024-03-04 DIAGNOSIS — K59.04 CHRONIC IDIOPATHIC CONSTIPATION: ICD-10-CM

## 2024-03-04 DIAGNOSIS — J38.5 LARYNGEAL SPASM: ICD-10-CM

## 2024-03-04 DIAGNOSIS — Z87.19 HISTORY OF PANCREATITIS: ICD-10-CM

## 2024-03-04 DIAGNOSIS — Z12.31 ENCOUNTER FOR SCREENING MAMMOGRAM FOR MALIGNANT NEOPLASM OF BREAST: ICD-10-CM

## 2024-03-04 DIAGNOSIS — R10.11 RIGHT UPPER QUADRANT PAIN: Primary | ICD-10-CM

## 2024-03-04 PROCEDURE — 99214 OFFICE O/P EST MOD 30 MIN: CPT | Performed by: FAMILY MEDICINE

## 2024-03-04 RX ORDER — PANTOPRAZOLE SODIUM 40 MG/1
40 TABLET, DELAYED RELEASE ORAL
Qty: 90 TABLET | Refills: 3 | Status: SHIPPED | OUTPATIENT
Start: 2024-03-04 | End: 2025-02-27

## 2024-03-04 RX ORDER — POLYETHYLENE GLYCOL 3350 17 G/17G
17 POWDER, FOR SOLUTION ORAL DAILY
Qty: 510 G | Refills: 1 | Status: SHIPPED | OUTPATIENT
Start: 2024-03-04
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
## 2024-03-13 DIAGNOSIS — M54.9 BACK PAIN: ICD-10-CM

## 2024-03-13 RX ORDER — MELOXICAM 15 MG/1
15 TABLET ORAL DAILY
Qty: 15 TABLET | Refills: 0 | Status: ON HOLD | OUTPATIENT
Start: 2024-03-13

## 2024-03-14 ENCOUNTER — TELEPHONE (OUTPATIENT)
Dept: FAMILY MEDICINE CLINIC | Facility: CLINIC | Age: 54
End: 2024-03-14

## 2024-03-16 ENCOUNTER — APPOINTMENT (OUTPATIENT)
Dept: LAB | Facility: CLINIC | Age: 54
End: 2024-03-16
Payer: COMMERCIAL

## 2024-03-16 ENCOUNTER — HOSPITAL ENCOUNTER (EMERGENCY)
Facility: HOSPITAL | Age: 54
Discharge: HOME | End: 2024-03-16
Payer: COMMERCIAL

## 2024-03-16 LAB
BASOPHILS # BLD AUTO: 0.04 THOUSANDS/ÂΜL (ref 0–0.1)
BASOPHILS NFR BLD AUTO: 0 % (ref 0–1)
EOSINOPHIL # BLD AUTO: 0.24 THOUSAND/ÂΜL (ref 0–0.61)
EOSINOPHIL NFR BLD AUTO: 2 % (ref 0–6)
ERYTHROCYTE [DISTWIDTH] IN BLOOD BY AUTOMATED COUNT: 14.8 % (ref 11.6–15.1)
HCT VFR BLD AUTO: 40 % (ref 34.8–46.1)
HGB BLD-MCNC: 12.7 G/DL (ref 11.5–15.4)
IMM GRANULOCYTES # BLD AUTO: 0.08 THOUSAND/UL (ref 0–0.2)
IMM GRANULOCYTES NFR BLD AUTO: 1 % (ref 0–2)
LYMPHOCYTES # BLD AUTO: 1.92 THOUSANDS/ÂΜL (ref 0.6–4.47)
LYMPHOCYTES NFR BLD AUTO: 19 % (ref 14–44)
MCH RBC QN AUTO: 29.7 PG (ref 26.8–34.3)
MCHC RBC AUTO-ENTMCNC: 31.8 G/DL (ref 31.4–37.4)
MCV RBC AUTO: 94 FL (ref 82–98)
MONOCYTES # BLD AUTO: 0.61 THOUSAND/ÂΜL (ref 0.17–1.22)
MONOCYTES NFR BLD AUTO: 6 % (ref 4–12)
NEUTROPHILS # BLD AUTO: 7.38 THOUSANDS/ÂΜL (ref 1.85–7.62)
NEUTS SEG NFR BLD AUTO: 72 % (ref 43–75)
NRBC BLD AUTO-RTO: 0 /100 WBCS
PLATELET # BLD AUTO: 281 THOUSANDS/UL (ref 149–390)
PMV BLD AUTO: 10.2 FL (ref 8.9–12.7)
RBC # BLD AUTO: 4.27 MILLION/UL (ref 3.81–5.12)
WBC # BLD AUTO: 10.27 THOUSAND/UL (ref 4.31–10.16)

## 2024-03-21 ENCOUNTER — TELEPHONE (OUTPATIENT)
Age: 54
End: 2024-03-21

## 2024-03-21 NOTE — TELEPHONE ENCOUNTER
Patient called in to see if she can get labs to see if she is pregnant. She states she doesn't get her period and her pcp told her to contact to her gyn.

## 2024-03-22 ENCOUNTER — HOSPITAL ENCOUNTER (INPATIENT)
Facility: HOSPITAL | Age: 54
LOS: 19 days | Discharge: HOME | DRG: 750 | End: 2024-04-10
Attending: PSYCHIATRY & NEUROLOGY | Admitting: PSYCHIATRY & NEUROLOGY
Payer: COMMERCIAL

## 2024-03-22 ENCOUNTER — HOSPITAL ENCOUNTER (EMERGENCY)
Facility: HOSPITAL | Age: 54
End: 2024-03-22
Attending: EMERGENCY MEDICINE
Payer: COMMERCIAL

## 2024-03-22 VITALS
SYSTOLIC BLOOD PRESSURE: 129 MMHG | OXYGEN SATURATION: 94 % | TEMPERATURE: 97.8 F | HEART RATE: 83 BPM | DIASTOLIC BLOOD PRESSURE: 85 MMHG | RESPIRATION RATE: 18 BRPM

## 2024-03-22 DIAGNOSIS — R45.851 SUICIDAL IDEATIONS: ICD-10-CM

## 2024-03-22 DIAGNOSIS — F25.0 SCHIZOAFFECTIVE DISORDER, BIPOLAR TYPE (HCC): Primary | Chronic | ICD-10-CM

## 2024-03-22 DIAGNOSIS — E55.9 VITAMIN D DEFICIENCY: ICD-10-CM

## 2024-03-22 DIAGNOSIS — K59.04 CHRONIC IDIOPATHIC CONSTIPATION: ICD-10-CM

## 2024-03-22 DIAGNOSIS — R09.81 CONGESTION OF NASAL SINUS: ICD-10-CM

## 2024-03-22 DIAGNOSIS — K59.01 SLOW TRANSIT CONSTIPATION: ICD-10-CM

## 2024-03-22 DIAGNOSIS — F29 PSYCHOSES (HCC): ICD-10-CM

## 2024-03-22 DIAGNOSIS — E78.5 HYPERLIPIDEMIA: ICD-10-CM

## 2024-03-22 DIAGNOSIS — M54.9 BACK PAIN: ICD-10-CM

## 2024-03-22 DIAGNOSIS — F10.10 ALCOHOL ABUSE: ICD-10-CM

## 2024-03-22 DIAGNOSIS — K64.9 HEMORRHOIDS, UNSPECIFIED HEMORRHOID TYPE: ICD-10-CM

## 2024-03-22 DIAGNOSIS — Z00.8 ENCOUNTER FOR PSYCHOLOGICAL EVALUATION: ICD-10-CM

## 2024-03-22 DIAGNOSIS — F29 PSYCHOSES (HCC): Primary | ICD-10-CM

## 2024-03-22 DIAGNOSIS — F17.210 CIGARETTE NICOTINE DEPENDENCE WITHOUT COMPLICATION: ICD-10-CM

## 2024-03-22 DIAGNOSIS — J43.9 PULMONARY EMPHYSEMA, UNSPECIFIED EMPHYSEMA TYPE (HCC): ICD-10-CM

## 2024-03-22 DIAGNOSIS — K21.9 GASTROESOPHAGEAL REFLUX DISEASE WITHOUT ESOPHAGITIS: ICD-10-CM

## 2024-03-22 DIAGNOSIS — M51.36 DEGENERATIVE DISC DISEASE, LUMBAR: ICD-10-CM

## 2024-03-22 DIAGNOSIS — F43.12 POST-TRAUMATIC STRESS DISORDER, CHRONIC: Chronic | ICD-10-CM

## 2024-03-22 DIAGNOSIS — R05.9 COUGH: ICD-10-CM

## 2024-03-22 DIAGNOSIS — J45.21 MILD INTERMITTENT ASTHMATIC BRONCHITIS WITH ACUTE EXACERBATION: ICD-10-CM

## 2024-03-22 LAB
ALBUMIN SERPL BCP-MCNC: 4.2 G/DL (ref 3.5–5)
ALP SERPL-CCNC: 78 U/L (ref 34–104)
ALT SERPL W P-5'-P-CCNC: 12 U/L (ref 7–52)
AMPHETAMINES SERPL QL SCN: NEGATIVE
ANION GAP SERPL CALCULATED.3IONS-SCNC: 8 MMOL/L (ref 4–13)
AST SERPL W P-5'-P-CCNC: 16 U/L (ref 13–39)
ATRIAL RATE: 77 BPM
BARBITURATES UR QL: NEGATIVE
BASOPHILS # BLD AUTO: 0.07 THOUSANDS/ÂΜL (ref 0–0.1)
BASOPHILS NFR BLD AUTO: 1 % (ref 0–1)
BENZODIAZ UR QL: NEGATIVE
BILIRUB SERPL-MCNC: 0.18 MG/DL (ref 0.2–1)
BILIRUB UR QL STRIP: NEGATIVE
BUN SERPL-MCNC: 4 MG/DL (ref 5–25)
CALCIUM SERPL-MCNC: 9.4 MG/DL (ref 8.4–10.2)
CHLORIDE SERPL-SCNC: 97 MMOL/L (ref 96–108)
CLARITY UR: CLEAR
CO2 SERPL-SCNC: 31 MMOL/L (ref 21–32)
COCAINE UR QL: NEGATIVE
COLOR UR: YELLOW
CREAT SERPL-MCNC: 0.65 MG/DL (ref 0.6–1.3)
EOSINOPHIL # BLD AUTO: 0.39 THOUSAND/ÂΜL (ref 0–0.61)
EOSINOPHIL NFR BLD AUTO: 3 % (ref 0–6)
ERYTHROCYTE [DISTWIDTH] IN BLOOD BY AUTOMATED COUNT: 14.7 % (ref 11.6–15.1)
ETHANOL SERPL-MCNC: <10 MG/DL
EXT PREGNANCY TEST URINE: NEGATIVE
EXT. CONTROL: NORMAL
GFR SERPL CREATININE-BSD FRML MDRD: 102 ML/MIN/1.73SQ M
GLUCOSE SERPL-MCNC: 81 MG/DL (ref 65–140)
GLUCOSE UR STRIP-MCNC: NEGATIVE MG/DL
HCT VFR BLD AUTO: 41.4 % (ref 34.8–46.1)
HGB BLD-MCNC: 13.6 G/DL (ref 11.5–15.4)
HGB UR QL STRIP.AUTO: NEGATIVE
IMM GRANULOCYTES # BLD AUTO: 0.18 THOUSAND/UL (ref 0–0.2)
IMM GRANULOCYTES NFR BLD AUTO: 1 % (ref 0–2)
KETONES UR STRIP-MCNC: NEGATIVE MG/DL
LEUKOCYTE ESTERASE UR QL STRIP: NEGATIVE
LYMPHOCYTES # BLD AUTO: 2.51 THOUSANDS/ÂΜL (ref 0.6–4.47)
LYMPHOCYTES NFR BLD AUTO: 20 % (ref 14–44)
MCH RBC QN AUTO: 30.5 PG (ref 26.8–34.3)
MCHC RBC AUTO-ENTMCNC: 32.9 G/DL (ref 31.4–37.4)
MCV RBC AUTO: 93 FL (ref 82–98)
METHADONE UR QL: NEGATIVE
MONOCYTES # BLD AUTO: 0.62 THOUSAND/ÂΜL (ref 0.17–1.22)
MONOCYTES NFR BLD AUTO: 5 % (ref 4–12)
NEUTROPHILS # BLD AUTO: 8.84 THOUSANDS/ÂΜL (ref 1.85–7.62)
NEUTS SEG NFR BLD AUTO: 70 % (ref 43–75)
NITRITE UR QL STRIP: NEGATIVE
NRBC BLD AUTO-RTO: 0 /100 WBCS
OPIATES UR QL SCN: NEGATIVE
OXYCODONE+OXYMORPHONE UR QL SCN: NEGATIVE
P AXIS: 59 DEGREES
PCP UR QL: NEGATIVE
PH UR STRIP.AUTO: 6 [PH]
PLATELET # BLD AUTO: 311 THOUSANDS/UL (ref 149–390)
PMV BLD AUTO: 8.6 FL (ref 8.9–12.7)
POTASSIUM SERPL-SCNC: 3.6 MMOL/L (ref 3.5–5.3)
PR INTERVAL: 140 MS
PROT SERPL-MCNC: 6.6 G/DL (ref 6.4–8.4)
PROT UR STRIP-MCNC: NEGATIVE MG/DL
QRS AXIS: 23 DEGREES
QRSD INTERVAL: 74 MS
QT INTERVAL: 420 MS
QTC INTERVAL: 475 MS
RBC # BLD AUTO: 4.46 MILLION/UL (ref 3.81–5.12)
SODIUM SERPL-SCNC: 136 MMOL/L (ref 135–147)
SP GR UR STRIP.AUTO: <=1.005
T WAVE AXIS: 57 DEGREES
THC UR QL: NEGATIVE
TSH SERPL DL<=0.05 MIU/L-ACNC: 1.66 UIU/ML (ref 0.45–4.5)
UROBILINOGEN UR QL STRIP.AUTO: 0.2 E.U./DL
VENTRICULAR RATE: 77 BPM
WBC # BLD AUTO: 12.61 THOUSAND/UL (ref 4.31–10.16)

## 2024-03-22 PROCEDURE — 81003 URINALYSIS AUTO W/O SCOPE: CPT | Performed by: EMERGENCY MEDICINE

## 2024-03-22 PROCEDURE — 93005 ELECTROCARDIOGRAM TRACING: CPT

## 2024-03-22 PROCEDURE — 99285 EMERGENCY DEPT VISIT HI MDM: CPT

## 2024-03-22 PROCEDURE — 82077 ASSAY SPEC XCP UR&BREATH IA: CPT | Performed by: EMERGENCY MEDICINE

## 2024-03-22 PROCEDURE — 85025 COMPLETE CBC W/AUTO DIFF WBC: CPT | Performed by: EMERGENCY MEDICINE

## 2024-03-22 PROCEDURE — 36415 COLL VENOUS BLD VENIPUNCTURE: CPT | Performed by: EMERGENCY MEDICINE

## 2024-03-22 PROCEDURE — 93010 ELECTROCARDIOGRAM REPORT: CPT | Performed by: INTERNAL MEDICINE

## 2024-03-22 PROCEDURE — 99285 EMERGENCY DEPT VISIT HI MDM: CPT | Performed by: EMERGENCY MEDICINE

## 2024-03-22 PROCEDURE — 80307 DRUG TEST PRSMV CHEM ANLYZR: CPT | Performed by: EMERGENCY MEDICINE

## 2024-03-22 PROCEDURE — 80053 COMPREHEN METABOLIC PANEL: CPT | Performed by: EMERGENCY MEDICINE

## 2024-03-22 PROCEDURE — 96372 THER/PROPH/DIAG INJ SC/IM: CPT

## 2024-03-22 PROCEDURE — 81025 URINE PREGNANCY TEST: CPT | Performed by: EMERGENCY MEDICINE

## 2024-03-22 PROCEDURE — 84443 ASSAY THYROID STIM HORMONE: CPT | Performed by: EMERGENCY MEDICINE

## 2024-03-22 RX ORDER — DIPHENHYDRAMINE HYDROCHLORIDE 50 MG/ML
50 INJECTION INTRAMUSCULAR; INTRAVENOUS EVERY 6 HOURS PRN
Status: DISCONTINUED | OUTPATIENT
Start: 2024-03-22 | End: 2024-04-10 | Stop reason: HOSPADM

## 2024-03-22 RX ORDER — ACETAMINOPHEN 325 MG/1
975 TABLET ORAL EVERY 6 HOURS PRN
Status: DISCONTINUED | OUTPATIENT
Start: 2024-03-22 | End: 2024-04-10 | Stop reason: HOSPADM

## 2024-03-22 RX ORDER — BISACODYL 10 MG
10 SUPPOSITORY, RECTAL RECTAL DAILY PRN
Status: DISCONTINUED | OUTPATIENT
Start: 2024-03-22 | End: 2024-04-10 | Stop reason: HOSPADM

## 2024-03-22 RX ORDER — HYDROXYZINE HYDROCHLORIDE 25 MG/1
25 TABLET, FILM COATED ORAL
Status: DISCONTINUED | OUTPATIENT
Start: 2024-03-22 | End: 2024-04-10 | Stop reason: HOSPADM

## 2024-03-22 RX ORDER — HYDROXYZINE 50 MG/1
50 TABLET, FILM COATED ORAL
Status: CANCELLED | OUTPATIENT
Start: 2024-03-22

## 2024-03-22 RX ORDER — MAGNESIUM HYDROXIDE/ALUMINUM HYDROXICE/SIMETHICONE 120; 1200; 1200 MG/30ML; MG/30ML; MG/30ML
30 SUSPENSION ORAL EVERY 4 HOURS PRN
Status: CANCELLED | OUTPATIENT
Start: 2024-03-22

## 2024-03-22 RX ORDER — PANTOPRAZOLE SODIUM 40 MG/1
40 TABLET, DELAYED RELEASE ORAL
Status: DISCONTINUED | OUTPATIENT
Start: 2024-03-23 | End: 2024-03-22 | Stop reason: HOSPADM

## 2024-03-22 RX ORDER — BENZTROPINE MESYLATE 1 MG/ML
1 INJECTION INTRAMUSCULAR; INTRAVENOUS
Status: DISCONTINUED | OUTPATIENT
Start: 2024-03-22 | End: 2024-03-25

## 2024-03-22 RX ORDER — TRAZODONE HYDROCHLORIDE 50 MG/1
50 TABLET ORAL
Status: DISCONTINUED | OUTPATIENT
Start: 2024-03-22 | End: 2024-04-10 | Stop reason: HOSPADM

## 2024-03-22 RX ORDER — LORAZEPAM 2 MG/ML
2 INJECTION INTRAMUSCULAR EVERY 6 HOURS PRN
Status: CANCELLED | OUTPATIENT
Start: 2024-03-22

## 2024-03-22 RX ORDER — ALBUTEROL SULFATE 2.5 MG/3ML
2.5 SOLUTION RESPIRATORY (INHALATION) EVERY 6 HOURS PRN
Status: DISCONTINUED | OUTPATIENT
Start: 2024-03-22 | End: 2024-03-22 | Stop reason: HOSPADM

## 2024-03-22 RX ORDER — ACETAMINOPHEN 325 MG/1
975 TABLET ORAL EVERY 6 HOURS PRN
Status: CANCELLED | OUTPATIENT
Start: 2024-03-22

## 2024-03-22 RX ORDER — ALBUTEROL SULFATE 90 UG/1
2 AEROSOL, METERED RESPIRATORY (INHALATION) EVERY 4 HOURS PRN
Status: DISCONTINUED | OUTPATIENT
Start: 2024-03-22 | End: 2024-03-22 | Stop reason: HOSPADM

## 2024-03-22 RX ORDER — HYDROXYZINE 50 MG/1
25 TABLET, FILM COATED ORAL
Status: CANCELLED | OUTPATIENT
Start: 2024-03-22

## 2024-03-22 RX ORDER — VENLAFAXINE HYDROCHLORIDE 150 MG/1
150 CAPSULE, EXTENDED RELEASE ORAL DAILY
Status: DISCONTINUED | OUTPATIENT
Start: 2024-03-22 | End: 2024-03-22 | Stop reason: HOSPADM

## 2024-03-22 RX ORDER — LANOLIN ALCOHOL/MO/W.PET/CERES
3 CREAM (GRAM) TOPICAL
Status: DISCONTINUED | OUTPATIENT
Start: 2024-03-22 | End: 2024-03-26

## 2024-03-22 RX ORDER — HALOPERIDOL 5 MG/ML
2.5 INJECTION INTRAMUSCULAR
Status: CANCELLED | OUTPATIENT
Start: 2024-03-22

## 2024-03-22 RX ORDER — HYDROXYZINE 50 MG/1
100 TABLET, FILM COATED ORAL
Status: DISCONTINUED | OUTPATIENT
Start: 2024-03-22 | End: 2024-04-01

## 2024-03-22 RX ORDER — LORAZEPAM 2 MG/ML
2 INJECTION INTRAMUSCULAR
Status: CANCELLED | OUTPATIENT
Start: 2024-03-22

## 2024-03-22 RX ORDER — BENZTROPINE MESYLATE 1 MG/1
1 TABLET ORAL
Status: DISCONTINUED | OUTPATIENT
Start: 2024-03-22 | End: 2024-04-10 | Stop reason: HOSPADM

## 2024-03-22 RX ORDER — LORAZEPAM 2 MG/ML
2 INJECTION INTRAMUSCULAR EVERY 6 HOURS PRN
Status: DISCONTINUED | OUTPATIENT
Start: 2024-03-22 | End: 2024-04-01

## 2024-03-22 RX ORDER — AMOXICILLIN 250 MG
1 CAPSULE ORAL DAILY PRN
Status: DISCONTINUED | OUTPATIENT
Start: 2024-03-22 | End: 2024-04-10 | Stop reason: HOSPADM

## 2024-03-22 RX ORDER — HALOPERIDOL 5 MG/1
5 TABLET ORAL
Status: DISCONTINUED | OUTPATIENT
Start: 2024-03-22 | End: 2024-03-25

## 2024-03-22 RX ORDER — ACETAMINOPHEN 325 MG/1
650 TABLET ORAL EVERY 6 HOURS PRN
Status: CANCELLED | OUTPATIENT
Start: 2024-03-22

## 2024-03-22 RX ORDER — TRAZODONE HYDROCHLORIDE 50 MG/1
50 TABLET ORAL
Status: CANCELLED | OUTPATIENT
Start: 2024-03-22

## 2024-03-22 RX ORDER — BENZTROPINE MESYLATE 0.5 MG/1
1 TABLET ORAL
Status: CANCELLED | OUTPATIENT
Start: 2024-03-22

## 2024-03-22 RX ORDER — BENZTROPINE MESYLATE 1 MG/ML
0.5 INJECTION INTRAMUSCULAR; INTRAVENOUS
Status: DISCONTINUED | OUTPATIENT
Start: 2024-03-22 | End: 2024-03-25

## 2024-03-22 RX ORDER — FENOFIBRATE 145 MG/1
145 TABLET, COATED ORAL DAILY
Status: DISCONTINUED | OUTPATIENT
Start: 2024-03-22 | End: 2024-03-22 | Stop reason: HOSPADM

## 2024-03-22 RX ORDER — LORATADINE 10 MG/1
10 TABLET ORAL DAILY
Status: DISCONTINUED | OUTPATIENT
Start: 2024-03-22 | End: 2024-03-22 | Stop reason: HOSPADM

## 2024-03-22 RX ORDER — LORAZEPAM 1 MG/1
1 TABLET ORAL 2 TIMES DAILY
Status: DISCONTINUED | OUTPATIENT
Start: 2024-03-22 | End: 2024-03-22 | Stop reason: HOSPADM

## 2024-03-22 RX ORDER — HALOPERIDOL 5 MG/1
5 TABLET ORAL
Status: CANCELLED | OUTPATIENT
Start: 2024-03-22

## 2024-03-22 RX ORDER — CLOZAPINE 100 MG/1
300 TABLET ORAL
Status: DISCONTINUED | OUTPATIENT
Start: 2024-03-22 | End: 2024-03-22 | Stop reason: HOSPADM

## 2024-03-22 RX ORDER — HALOPERIDOL 0.5 MG/1
1 TABLET ORAL EVERY 6 HOURS PRN
Status: DISCONTINUED | OUTPATIENT
Start: 2024-03-22 | End: 2024-03-25

## 2024-03-22 RX ORDER — HALOPERIDOL 5 MG/ML
5 INJECTION INTRAMUSCULAR
Status: DISCONTINUED | OUTPATIENT
Start: 2024-03-22 | End: 2024-03-25

## 2024-03-22 RX ORDER — ACETAMINOPHEN 325 MG/1
650 TABLET ORAL EVERY 6 HOURS PRN
Status: DISCONTINUED | OUTPATIENT
Start: 2024-03-22 | End: 2024-04-10 | Stop reason: HOSPADM

## 2024-03-22 RX ORDER — LORAZEPAM 2 MG/ML
1 INJECTION INTRAMUSCULAR
Status: DISCONTINUED | OUTPATIENT
Start: 2024-03-22 | End: 2024-03-25

## 2024-03-22 RX ORDER — LANOLIN ALCOHOL/MO/W.PET/CERES
3 CREAM (GRAM) TOPICAL
Status: CANCELLED | OUTPATIENT
Start: 2024-03-22

## 2024-03-22 RX ORDER — DIPHENHYDRAMINE HYDROCHLORIDE 50 MG/ML
50 INJECTION INTRAMUSCULAR; INTRAVENOUS EVERY 6 HOURS PRN
Status: CANCELLED | OUTPATIENT
Start: 2024-03-22

## 2024-03-22 RX ORDER — HALOPERIDOL 0.5 MG/1
1 TABLET ORAL EVERY 6 HOURS PRN
Status: CANCELLED | OUTPATIENT
Start: 2024-03-22

## 2024-03-22 RX ORDER — HALOPERIDOL 5 MG/ML
2.5 INJECTION INTRAMUSCULAR
Status: DISCONTINUED | OUTPATIENT
Start: 2024-03-22 | End: 2024-03-25

## 2024-03-22 RX ORDER — LORAZEPAM 2 MG/ML
2 INJECTION INTRAMUSCULAR
Status: DISCONTINUED | OUTPATIENT
Start: 2024-03-22 | End: 2024-03-25

## 2024-03-22 RX ORDER — BISACODYL 10 MG
10 SUPPOSITORY, RECTAL RECTAL DAILY PRN
Status: CANCELLED | OUTPATIENT
Start: 2024-03-22

## 2024-03-22 RX ORDER — ACETAMINOPHEN 325 MG/1
650 TABLET ORAL EVERY 4 HOURS PRN
Status: DISCONTINUED | OUTPATIENT
Start: 2024-03-22 | End: 2024-04-10 | Stop reason: HOSPADM

## 2024-03-22 RX ORDER — LAMOTRIGINE 100 MG/1
100 TABLET ORAL 2 TIMES DAILY
Status: DISCONTINUED | OUTPATIENT
Start: 2024-03-22 | End: 2024-03-22 | Stop reason: HOSPADM

## 2024-03-22 RX ORDER — POLYETHYLENE GLYCOL 3350 17 G/17G
17 POWDER, FOR SOLUTION ORAL DAILY PRN
Status: DISCONTINUED | OUTPATIENT
Start: 2024-03-22 | End: 2024-04-10 | Stop reason: HOSPADM

## 2024-03-22 RX ORDER — HYDROXYZINE 50 MG/1
50 TABLET, FILM COATED ORAL
Status: DISCONTINUED | OUTPATIENT
Start: 2024-03-22 | End: 2024-04-10 | Stop reason: HOSPADM

## 2024-03-22 RX ORDER — LORAZEPAM 2 MG/ML
1 INJECTION INTRAMUSCULAR
Status: CANCELLED | OUTPATIENT
Start: 2024-03-22

## 2024-03-22 RX ORDER — GABAPENTIN 300 MG/1
300 CAPSULE ORAL 3 TIMES DAILY
Status: DISCONTINUED | OUTPATIENT
Start: 2024-03-22 | End: 2024-03-22 | Stop reason: HOSPADM

## 2024-03-22 RX ORDER — OLANZAPINE 10 MG/2ML
5 INJECTION, POWDER, FOR SOLUTION INTRAMUSCULAR ONCE
Status: COMPLETED | OUTPATIENT
Start: 2024-03-22 | End: 2024-03-22

## 2024-03-22 RX ORDER — AMOXICILLIN 250 MG
1 CAPSULE ORAL DAILY PRN
Status: CANCELLED | OUTPATIENT
Start: 2024-03-22

## 2024-03-22 RX ORDER — POLYETHYLENE GLYCOL 3350 17 G/17G
17 POWDER, FOR SOLUTION ORAL DAILY PRN
Status: CANCELLED | OUTPATIENT
Start: 2024-03-22

## 2024-03-22 RX ORDER — ACETAMINOPHEN 325 MG/1
650 TABLET ORAL EVERY 4 HOURS PRN
Status: CANCELLED | OUTPATIENT
Start: 2024-03-22

## 2024-03-22 RX ORDER — LORAZEPAM 1 MG/1
2 TABLET ORAL ONCE
Status: COMPLETED | OUTPATIENT
Start: 2024-03-22 | End: 2024-03-22

## 2024-03-22 RX ORDER — BENZTROPINE MESYLATE 1 MG/ML
0.5 INJECTION INTRAMUSCULAR; INTRAVENOUS
Status: CANCELLED | OUTPATIENT
Start: 2024-03-22

## 2024-03-22 RX ORDER — HYDROXYZINE 50 MG/1
100 TABLET, FILM COATED ORAL
Status: CANCELLED | OUTPATIENT
Start: 2024-03-22

## 2024-03-22 RX ORDER — BENZTROPINE MESYLATE 1 MG/ML
1 INJECTION INTRAMUSCULAR; INTRAVENOUS
Status: CANCELLED | OUTPATIENT
Start: 2024-03-22

## 2024-03-22 RX ORDER — MAGNESIUM HYDROXIDE/ALUMINUM HYDROXICE/SIMETHICONE 120; 1200; 1200 MG/30ML; MG/30ML; MG/30ML
30 SUSPENSION ORAL EVERY 4 HOURS PRN
Status: DISCONTINUED | OUTPATIENT
Start: 2024-03-22 | End: 2024-04-10 | Stop reason: HOSPADM

## 2024-03-22 RX ORDER — HALOPERIDOL 5 MG/ML
5 INJECTION INTRAMUSCULAR
Status: CANCELLED | OUTPATIENT
Start: 2024-03-22

## 2024-03-22 RX ADMIN — LORAZEPAM 1 MG: 1 TABLET ORAL at 17:18

## 2024-03-22 RX ADMIN — FENOFIBRATE 145 MG: 145 TABLET, FILM COATED ORAL at 17:18

## 2024-03-22 RX ADMIN — LAMOTRIGINE 100 MG: 100 TABLET ORAL at 17:17

## 2024-03-22 RX ADMIN — LORATADINE 10 MG: 10 TABLET ORAL at 15:14

## 2024-03-22 RX ADMIN — VENLAFAXINE HYDROCHLORIDE 150 MG: 150 CAPSULE, EXTENDED RELEASE ORAL at 15:14

## 2024-03-22 RX ADMIN — RISPERIDONE 1.5 MG: 0.25 TABLET, FILM COATED ORAL at 17:21

## 2024-03-22 RX ADMIN — OLANZAPINE 5 MG: 10 INJECTION, POWDER, FOR SOLUTION INTRAMUSCULAR at 14:08

## 2024-03-22 RX ADMIN — LORAZEPAM 1 MG: 1 TABLET ORAL at 23:14

## 2024-03-22 RX ADMIN — GABAPENTIN 300 MG: 300 CAPSULE ORAL at 20:33

## 2024-03-22 RX ADMIN — GABAPENTIN 300 MG: 300 CAPSULE ORAL at 15:14

## 2024-03-22 NOTE — ED NOTES
Crisis prepared 201 and obtained signatures. Pt informed about her rights, 72 hours notice. Pt informed about inpatient psychiatric hospitalization process.    Pt requesting SLL-BHU or OA only at this time.
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
## 2024-03-22 NOTE — ED NOTES
See previous [place] Suicide Risk Assessment. Re-screening not required unless change in behavior or suicidal ideation.  Behavioral Health Assessment deferred as patient is sleeping and would benefit from additional rest.  Vital signs deferred until patient awake, no signs or symptoms of respiratory distress at this time.    Once patient is awake and able to participate, will complete assessments.      Conor Issa RN  03/22/24 [id number]

## 2024-03-22 NOTE — ED NOTES
Patient is accepted at [facility].  Patient is accepted by Dr. Taylor Castro per Clarita/Intake.     Transportation is arranged with [facility].     Transportation is scheduled for pending.   Patient may go to the floor at pending.          Nurse report is to be called to [phone] prior to patient transfer.

## 2024-03-23 LAB
25(OH)D3 SERPL-MCNC: 38.2 NG/ML (ref 30–100)
ALBUMIN SERPL BCP-MCNC: 3.8 G/DL (ref 3.5–5)
ALP SERPL-CCNC: 76 U/L (ref 34–104)
ALT SERPL W P-5'-P-CCNC: 10 U/L (ref 7–52)
ANION GAP SERPL CALCULATED.3IONS-SCNC: 8 MMOL/L (ref 4–13)
AST SERPL W P-5'-P-CCNC: 13 U/L (ref 13–39)
ATRIAL RATE: 68 BPM
BACTERIA UR QL AUTO: NORMAL /HPF
BASOPHILS # BLD AUTO: 0.08 THOUSANDS/ÂΜL (ref 0–0.1)
BASOPHILS NFR BLD AUTO: 1 % (ref 0–1)
BILIRUB SERPL-MCNC: 0.18 MG/DL (ref 0.2–1)
BILIRUB UR QL STRIP: NEGATIVE
BUN SERPL-MCNC: 6 MG/DL (ref 5–25)
CALCIUM SERPL-MCNC: 8.9 MG/DL (ref 8.4–10.2)
CHLORIDE SERPL-SCNC: 101 MMOL/L (ref 96–108)
CHOLEST SERPL-MCNC: 221 MG/DL
CLARITY UR: CLEAR
CO2 SERPL-SCNC: 28 MMOL/L (ref 21–32)
COLOR UR: NORMAL
CREAT SERPL-MCNC: 0.56 MG/DL (ref 0.6–1.3)
EOSINOPHIL # BLD AUTO: 0.37 THOUSAND/ÂΜL (ref 0–0.61)
EOSINOPHIL NFR BLD AUTO: 4 % (ref 0–6)
ERYTHROCYTE [DISTWIDTH] IN BLOOD BY AUTOMATED COUNT: 15.3 % (ref 11.6–15.1)
FOLATE SERPL-MCNC: 11.9 NG/ML
GFR SERPL CREATININE-BSD FRML MDRD: 107 ML/MIN/1.73SQ M
GLUCOSE P FAST SERPL-MCNC: 88 MG/DL (ref 65–99)
GLUCOSE SERPL-MCNC: 88 MG/DL (ref 65–140)
GLUCOSE UR STRIP-MCNC: NEGATIVE MG/DL
HCT VFR BLD AUTO: 44.1 % (ref 34.8–46.1)
HDLC SERPL-MCNC: 61 MG/DL
HGB BLD-MCNC: 13.7 G/DL (ref 11.5–15.4)
HGB UR QL STRIP.AUTO: NEGATIVE
IMM GRANULOCYTES # BLD AUTO: 0.17 THOUSAND/UL (ref 0–0.2)
IMM GRANULOCYTES NFR BLD AUTO: 2 % (ref 0–2)
KETONES UR STRIP-MCNC: NEGATIVE MG/DL
LDLC SERPL CALC-MCNC: 118 MG/DL (ref 0–100)
LEUKOCYTE ESTERASE UR QL STRIP: NEGATIVE
LYMPHOCYTES # BLD AUTO: 2.03 THOUSANDS/ÂΜL (ref 0.6–4.47)
LYMPHOCYTES NFR BLD AUTO: 19 % (ref 14–44)
MCH RBC QN AUTO: 29.3 PG (ref 26.8–34.3)
MCHC RBC AUTO-ENTMCNC: 31.1 G/DL (ref 31.4–37.4)
MCV RBC AUTO: 94 FL (ref 82–98)
MONOCYTES # BLD AUTO: 0.74 THOUSAND/ÂΜL (ref 0.17–1.22)
MONOCYTES NFR BLD AUTO: 7 % (ref 4–12)
NEUTROPHILS # BLD AUTO: 7.27 THOUSANDS/ÂΜL (ref 1.85–7.62)
NEUTS SEG NFR BLD AUTO: 67 % (ref 43–75)
NITRITE UR QL STRIP: NEGATIVE
NON-SQ EPI CELLS URNS QL MICRO: NORMAL /HPF
NONHDLC SERPL-MCNC: 160 MG/DL
NRBC BLD AUTO-RTO: 0 /100 WBCS
P AXIS: 58 DEGREES
PH UR STRIP.AUTO: 7 [PH]
PLATELET # BLD AUTO: 335 THOUSANDS/UL (ref 149–390)
PMV BLD AUTO: 8.9 FL (ref 8.9–12.7)
POTASSIUM SERPL-SCNC: 4.3 MMOL/L (ref 3.5–5.3)
PR INTERVAL: 152 MS
PROT SERPL-MCNC: 6.2 G/DL (ref 6.4–8.4)
PROT UR STRIP-MCNC: NEGATIVE MG/DL
QRS AXIS: 3 DEGREES
QRSD INTERVAL: 68 MS
QT INTERVAL: 440 MS
QTC INTERVAL: 467 MS
RBC # BLD AUTO: 4.67 MILLION/UL (ref 3.81–5.12)
RBC #/AREA URNS AUTO: NORMAL /HPF
SODIUM SERPL-SCNC: 137 MMOL/L (ref 135–147)
SP GR UR STRIP.AUTO: 1.01
T WAVE AXIS: 50 DEGREES
TRIGL SERPL-MCNC: 211 MG/DL
TSH SERPL DL<=0.05 MIU/L-ACNC: 1.66 UIU/ML (ref 0.45–4.5)
UROBILINOGEN UR QL STRIP.AUTO: 0.2 E.U./DL
VENTRICULAR RATE: 68 BPM
VIT B12 SERPL-MCNC: 366 PG/ML (ref 180–914)
WBC # BLD AUTO: 10.66 THOUSAND/UL (ref 4.31–10.16)
WBC #/AREA URNS AUTO: NORMAL /HPF

## 2024-03-23 PROCEDURE — 82607 VITAMIN B-12: CPT | Performed by: PSYCHIATRY & NEUROLOGY

## 2024-03-23 PROCEDURE — 82306 VITAMIN D 25 HYDROXY: CPT | Performed by: PSYCHIATRY & NEUROLOGY

## 2024-03-23 PROCEDURE — 82746 ASSAY OF FOLIC ACID SERUM: CPT | Performed by: PSYCHIATRY & NEUROLOGY

## 2024-03-23 PROCEDURE — 85025 COMPLETE CBC W/AUTO DIFF WBC: CPT | Performed by: PSYCHIATRY & NEUROLOGY

## 2024-03-23 PROCEDURE — 99223 1ST HOSP IP/OBS HIGH 75: CPT

## 2024-03-23 PROCEDURE — 93005 ELECTROCARDIOGRAM TRACING: CPT

## 2024-03-23 PROCEDURE — 80061 LIPID PANEL: CPT | Performed by: PSYCHIATRY & NEUROLOGY

## 2024-03-23 PROCEDURE — 80053 COMPREHEN METABOLIC PANEL: CPT | Performed by: PSYCHIATRY & NEUROLOGY

## 2024-03-23 PROCEDURE — 93010 ELECTROCARDIOGRAM REPORT: CPT | Performed by: INTERNAL MEDICINE

## 2024-03-23 PROCEDURE — 81001 URINALYSIS AUTO W/SCOPE: CPT | Performed by: PSYCHIATRY & NEUROLOGY

## 2024-03-23 PROCEDURE — 84443 ASSAY THYROID STIM HORMONE: CPT | Performed by: PSYCHIATRY & NEUROLOGY

## 2024-03-23 RX ORDER — NICOTINE 21 MG/24HR
1 PATCH, TRANSDERMAL 24 HOURS TRANSDERMAL DAILY
Status: DISCONTINUED | OUTPATIENT
Start: 2024-03-23 | End: 2024-04-10 | Stop reason: HOSPADM

## 2024-03-23 RX ORDER — LORAZEPAM 0.5 MG/1
0.5 TABLET ORAL EVERY 8 HOURS PRN
Status: DISCONTINUED | OUTPATIENT
Start: 2024-03-23 | End: 2024-03-27

## 2024-03-23 RX ORDER — VENLAFAXINE HYDROCHLORIDE 150 MG/1
150 CAPSULE, EXTENDED RELEASE ORAL DAILY
Status: DISCONTINUED | OUTPATIENT
Start: 2024-03-23 | End: 2024-04-02

## 2024-03-23 RX ORDER — LANOLIN ALCOHOL/MO/W.PET/CERES
100 CREAM (GRAM) TOPICAL DAILY
Status: DISCONTINUED | OUTPATIENT
Start: 2024-03-23 | End: 2024-04-10 | Stop reason: HOSPADM

## 2024-03-23 RX ORDER — MELOXICAM 7.5 MG/1
15 TABLET ORAL DAILY
Status: DISCONTINUED | OUTPATIENT
Start: 2024-03-23 | End: 2024-04-10 | Stop reason: HOSPADM

## 2024-03-23 RX ORDER — PANTOPRAZOLE SODIUM 40 MG/1
40 TABLET, DELAYED RELEASE ORAL
Status: DISCONTINUED | OUTPATIENT
Start: 2024-03-23 | End: 2024-04-10 | Stop reason: HOSPADM

## 2024-03-23 RX ORDER — GUAIFENESIN 600 MG/1
600 TABLET, EXTENDED RELEASE ORAL EVERY 12 HOURS SCHEDULED
Status: DISCONTINUED | OUTPATIENT
Start: 2024-03-23 | End: 2024-03-30

## 2024-03-23 RX ORDER — LIDOCAINE 50 MG/G
1 PATCH TOPICAL DAILY
Status: DISCONTINUED | OUTPATIENT
Start: 2024-03-23 | End: 2024-04-10 | Stop reason: HOSPADM

## 2024-03-23 RX ORDER — METHOCARBAMOL 500 MG/1
500 TABLET, FILM COATED ORAL 2 TIMES DAILY
Status: DISCONTINUED | OUTPATIENT
Start: 2024-03-23 | End: 2024-04-05

## 2024-03-23 RX ORDER — FLUTICASONE PROPIONATE 50 MCG
1 SPRAY, SUSPENSION (ML) NASAL DAILY
Status: DISCONTINUED | OUTPATIENT
Start: 2024-03-23 | End: 2024-04-10 | Stop reason: HOSPADM

## 2024-03-23 RX ORDER — CLOZAPINE 100 MG/1
350 TABLET ORAL
Status: DISCONTINUED | OUTPATIENT
Start: 2024-03-23 | End: 2024-03-29

## 2024-03-23 RX ORDER — FENOFIBRATE 145 MG/1
145 TABLET, COATED ORAL DAILY
Status: DISCONTINUED | OUTPATIENT
Start: 2024-03-23 | End: 2024-04-10 | Stop reason: HOSPADM

## 2024-03-23 RX ORDER — LORAZEPAM 1 MG/1
1 TABLET ORAL EVERY EVENING
Status: DISCONTINUED | OUTPATIENT
Start: 2024-03-23 | End: 2024-04-02

## 2024-03-23 RX ORDER — FOLIC ACID 1 MG/1
1 TABLET ORAL DAILY
Status: DISCONTINUED | OUTPATIENT
Start: 2024-03-23 | End: 2024-04-10 | Stop reason: HOSPADM

## 2024-03-23 RX ORDER — LAMOTRIGINE 100 MG/1
100 TABLET ORAL 2 TIMES DAILY
Status: DISCONTINUED | OUTPATIENT
Start: 2024-03-23 | End: 2024-04-10 | Stop reason: HOSPADM

## 2024-03-23 RX ORDER — ALBUTEROL SULFATE 90 UG/1
2 AEROSOL, METERED RESPIRATORY (INHALATION) EVERY 4 HOURS PRN
Status: DISCONTINUED | OUTPATIENT
Start: 2024-03-23 | End: 2024-04-10 | Stop reason: HOSPADM

## 2024-03-23 RX ORDER — LIDOCAINE 50 MG/G
1 PATCH TOPICAL DAILY PRN
Status: DISCONTINUED | OUTPATIENT
Start: 2024-03-23 | End: 2024-03-23

## 2024-03-23 RX ORDER — BUDESONIDE AND FORMOTEROL FUMARATE DIHYDRATE 160; 4.5 UG/1; UG/1
2 AEROSOL RESPIRATORY (INHALATION) 2 TIMES DAILY
Status: DISCONTINUED | OUTPATIENT
Start: 2024-03-23 | End: 2024-04-10 | Stop reason: HOSPADM

## 2024-03-23 RX ADMIN — NICOTINE 1 PATCH: 21 PATCH, EXTENDED RELEASE TRANSDERMAL at 13:01

## 2024-03-23 RX ADMIN — LIDOCAINE 5% 1 PATCH: 700 PATCH TOPICAL at 15:01

## 2024-03-23 RX ADMIN — CHOLECALCIFEROL TAB 25 MCG (1000 UNIT) 1000 UNITS: 25 TAB at 11:23

## 2024-03-23 RX ADMIN — RISPERIDONE 1.5 MG: 1 TABLET ORAL at 13:01

## 2024-03-23 RX ADMIN — FENOFIBRATE 145 MG: 145 TABLET, FILM COATED ORAL at 11:23

## 2024-03-23 RX ADMIN — PANTOPRAZOLE SODIUM 40 MG: 40 TABLET, DELAYED RELEASE ORAL at 11:23

## 2024-03-23 RX ADMIN — Medication 1 TABLET: at 15:01

## 2024-03-23 RX ADMIN — FOLIC ACID 1 MG: 1 TABLET ORAL at 15:00

## 2024-03-23 RX ADMIN — MELOXICAM 15 MG: 7.5 TABLET ORAL at 11:22

## 2024-03-23 RX ADMIN — CLOZAPINE 350 MG: 100 TABLET ORAL at 21:20

## 2024-03-23 RX ADMIN — GUAIFENESIN 600 MG: 600 TABLET ORAL at 21:20

## 2024-03-23 RX ADMIN — LAMOTRIGINE 100 MG: 100 TABLET ORAL at 17:47

## 2024-03-23 RX ADMIN — VENLAFAXINE HYDROCHLORIDE 150 MG: 150 CAPSULE, EXTENDED RELEASE ORAL at 13:01

## 2024-03-23 RX ADMIN — LAMOTRIGINE 100 MG: 100 TABLET ORAL at 13:01

## 2024-03-23 RX ADMIN — METHOCARBAMOL TABLETS 500 MG: 500 TABLET, COATED ORAL at 17:47

## 2024-03-23 RX ADMIN — METHOCARBAMOL TABLETS 500 MG: 500 TABLET, COATED ORAL at 11:23

## 2024-03-23 RX ADMIN — LORAZEPAM 1 MG: 1 TABLET ORAL at 17:47

## 2024-03-23 RX ADMIN — SENNOSIDES AND DOCUSATE SODIUM 1 TABLET: 8.6; 5 TABLET ORAL at 16:55

## 2024-03-23 RX ADMIN — HALOPERIDOL 5 MG: 5 TABLET ORAL at 11:23

## 2024-03-23 RX ADMIN — THIAMINE HCL TAB 100 MG 100 MG: 100 TAB at 15:00

## 2024-03-23 RX ADMIN — FLUTICASONE PROPIONATE 1 SPRAY: 50 SPRAY, METERED NASAL at 15:01

## 2024-03-23 RX ADMIN — BUDESONIDE AND FORMOTEROL FUMARATE DIHYDRATE 2 PUFF: 160; 4.5 AEROSOL RESPIRATORY (INHALATION) at 17:48

## 2024-03-23 RX ADMIN — Medication 3 MG: at 21:21

## 2024-03-23 RX ADMIN — RISPERIDONE 1.5 MG: 1 TABLET ORAL at 17:47

## 2024-03-23 RX ADMIN — GUAIFENESIN 600 MG: 600 TABLET ORAL at 15:01

## 2024-03-23 NOTE — TREATMENT PLAN
TREATMENT PLAN REVIEW - Behavioral Health Jo-Ann Lamb 53 y.o. 1970 female MRN: [id number]    [facility] ([facility] BEHAVIORAL HEALTH Room / Bed: [facility] 256/[facility] 256-01 Encounter: [id number]          Admit Date/Time:  3/22/2024  9:45 PM    Treatment Team:   MD Nomi Peguero, RN  Sergio Altamirano    Diagnosis: Principal Problem:    Schizoaffective disorder, bipolar type (HCC)      Patient Strengths/Assets: average or above intelligence, capable of independent living, cooperative, communication skills, compliant with medication, good physical health, motivated, motivation for treatment/growth, negotiates basic needs, resourceful, special hobby/interest    Patient Barriers/Limitations:  difficulty with insight    Short Term Goals: decrease in depressive symptoms, decrease in anxiety symptoms, decrease in psychotic symptoms, decrease in suicidal thoughts    Long Term Goals: improvement in depression, improvement in anxiety, stabilization of mood, free of suicidal thoughts, improved insight    Progress Towards Goals: starting psychiatric medications as prescribed, continue psychiatric medications as prescribed    Recommended Treatment: medication management, patient medication education, group therapy, milieu therapy, continued Behavioral Health psychiatric evaluation/assessment process    Treatment Frequency: daily medication monitoring, group and milieu therapy daily, monitoring through interdisciplinary rounds, monitoring through weekly patient care conferences    Expected Discharge Date:  10    Discharge Plan: return to previous living arrangement, referrals as needed    Treatment Plan Created/Updated By: Vadim Duncan DO
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
## 2024-03-23 NOTE — NURSING NOTE
Patient visible on milieu.Appears internally preoccupied.Blunted affected.Pleasant and cooperative.Schedule PO medication administered as ordered.Denies HI, SI.Report feeling find   Appetite good. Attend group.Continue on safety check

## 2024-03-23 NOTE — PLAN OF CARE
Problem: Alteration in Thoughts and Perception  Goal: Treatment Goal: Gain control of psychotic behaviors/thinking, reduce/eliminate presenting symptoms and demonstrate improved reality functioning upon discharge  Outcome: Progressing  Goal: Refrain from acting on delusional thinking/internal stimuli  Description: Interventions:  - Monitor patient closely, per order   - Utilize least restrictive measures   - Set reasonable limits, give positive feedback for acceptable   - Administer medications as ordered and monitor of potential side effects  Outcome: Progressing  Goal: Recognize dysfunctional thoughts, communicate reality-based thoughts at the time of discharge  Description: Interventions:  - Provide medication and psycho-education to assist patient in compliance and developing insight into his/her illness   Outcome: Progressing  Goal: Complete daily ADLs, including personal hygiene independently, as able  Description: Interventions:  - Observe, teach, and assist patient with ADLS  - Monitor and promote a balance of rest/activity, with adequate nutrition and elimination   Outcome: Progressing     Problem: Risk for Self Injury/Neglect  Goal: Treatment Goal: Remain safe during length of stay, learn and adopt new coping skills, and be free of self-injurious ideation, impulses and acts at the time of discharge  Outcome: Progressing  Goal: Refrain from harming self  Description: Interventions:  - Monitor patient closely, per order  - Develop a trusting relationship  - Supervise medication ingestion, monitor effects and side effects   Outcome: Progressing  Goal: Recognize maladaptive responses and adopt new coping mechanisms  Outcome: Progressing     Problem: Depression  Goal: Treatment Goal: Demonstrate behavioral control of depressive symptoms, verbalize feelings of improved mood/affect, and adopt new coping skills prior to discharge  Outcome: Progressing  Goal: Verbalize thoughts and feelings  Description:  Interventions:  - Assess and re-assess patient's level of risk   - Engage patient in 1:1 interactions, daily, for a minimum of 15 minutes   - Encourage patient to express feelings, fears, frustrations, hopes   Outcome: Progressing  Goal: Refrain from self-neglect  Outcome: Progressing     Problem: Anxiety  Goal: Anxiety is at manageable level  Description: Interventions:  - Assess and monitor patient's anxiety level.   - Monitor for signs and symptoms (heart palpitations, chest pain, shortness of breath, headaches, nausea, feeling jumpy, restlessness, irritable, apprehensive).   - Collaborate with interdisciplinary team and initiate plan and interventions as ordered.  - Bee patient to unit/surroundings  - Explain treatment plan  - Encourage participation in care  - Encourage verbalization of concerns/fears  - Identify coping mechanisms  - Assist in developing anxiety-reducing skills  - Administer/offer alternative therapies  - Limit or eliminate stimulants  Outcome: Progressing

## 2024-03-23 NOTE — NURSING NOTE
Patient is 54 year admitted on 201 with SI with plan to drink a ton of alcohol. Patient report hearing voices people telling her to go to the hospital. Patient has a long Hx of psychiatric hospitalizations, last one at [facility] in october last year after a suicide attempt by over dose with pills.Patient arrived via EMS, was calm cooperative and pleasant on approach. Patient states need medication adjustments, the current medications are not working. Patient completed admission paperwork,denies SI, HI but did endorse auditory hallucinations.Patient was search body and belongings, skin was intact, oriented to the unit and placed on Q 7 safety monitoring.

## 2024-03-23 NOTE — ED NOTES
Insurance Authorization for admission:   Phone call placed to [facility].  Phone number: [phone].     Spoke to Edna.     5 days approved.  Level of care: 201.  Review on 3/26/2024.   Authorization # [id number].        Eligibility Verification System checked - ([phone]).  Online system / automated system indicates: ACTIVE

## 2024-03-23 NOTE — ED NOTES
Crisis prepared hospital packet, copies obtained for the record, EMTALA and Medical Necessity Form.
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
## 2024-03-23 NOTE — ED NOTES
[facility] claimed the ride for Jo-Ann Lamb in unit/room SH 02 bed  02, and will arrive on 03/22/2024 at 9:30pm EDT.

## 2024-03-23 NOTE — PLAN OF CARE
Problem: Risk for Self Injury/Neglect  Goal: Refrain from harming self  Description: Interventions:  - Monitor patient closely, per order  - Develop a trusting relationship  - Supervise medication ingestion, monitor effects and side effects   Outcome: Progressing

## 2024-03-24 PROCEDURE — 99232 SBSQ HOSP IP/OBS MODERATE 35: CPT

## 2024-03-24 RX ORDER — RISPERIDONE 1 MG/1
1 TABLET ORAL ONCE
Status: COMPLETED | OUTPATIENT
Start: 2024-03-24 | End: 2024-03-24

## 2024-03-24 RX ADMIN — RISPERIDONE 1.5 MG: 1 TABLET ORAL at 08:43

## 2024-03-24 RX ADMIN — POLYETHYLENE GLYCOL 3350 17 G: 17 POWDER, FOR SOLUTION ORAL at 09:55

## 2024-03-24 RX ADMIN — BUDESONIDE AND FORMOTEROL FUMARATE DIHYDRATE 2 PUFF: 160; 4.5 AEROSOL RESPIRATORY (INHALATION) at 17:36

## 2024-03-24 RX ADMIN — METHOCARBAMOL TABLETS 500 MG: 500 TABLET, COATED ORAL at 08:43

## 2024-03-24 RX ADMIN — CHOLECALCIFEROL TAB 25 MCG (1000 UNIT) 1000 UNITS: 25 TAB at 08:43

## 2024-03-24 RX ADMIN — FENOFIBRATE 145 MG: 145 TABLET, FILM COATED ORAL at 08:43

## 2024-03-24 RX ADMIN — LAMOTRIGINE 100 MG: 100 TABLET ORAL at 17:35

## 2024-03-24 RX ADMIN — Medication 3 MG: at 20:19

## 2024-03-24 RX ADMIN — METHOCARBAMOL TABLETS 500 MG: 500 TABLET, COATED ORAL at 17:34

## 2024-03-24 RX ADMIN — VENLAFAXINE HYDROCHLORIDE 150 MG: 150 CAPSULE, EXTENDED RELEASE ORAL at 08:43

## 2024-03-24 RX ADMIN — PANTOPRAZOLE SODIUM 40 MG: 40 TABLET, DELAYED RELEASE ORAL at 06:41

## 2024-03-24 RX ADMIN — RISPERIDONE 1.5 MG: 1 TABLET ORAL at 17:34

## 2024-03-24 RX ADMIN — GUAIFENESIN 600 MG: 600 TABLET ORAL at 20:19

## 2024-03-24 RX ADMIN — BUDESONIDE AND FORMOTEROL FUMARATE DIHYDRATE 2 PUFF: 160; 4.5 AEROSOL RESPIRATORY (INHALATION) at 08:59

## 2024-03-24 RX ADMIN — FLUTICASONE PROPIONATE 1 SPRAY: 50 SPRAY, METERED NASAL at 08:59

## 2024-03-24 RX ADMIN — Medication 1 TABLET: at 08:44

## 2024-03-24 RX ADMIN — CLOZAPINE 350 MG: 100 TABLET ORAL at 20:19

## 2024-03-24 RX ADMIN — THIAMINE HCL TAB 100 MG 100 MG: 100 TAB at 08:43

## 2024-03-24 RX ADMIN — LORAZEPAM 1 MG: 1 TABLET ORAL at 17:35

## 2024-03-24 RX ADMIN — LAMOTRIGINE 100 MG: 100 TABLET ORAL at 08:44

## 2024-03-24 RX ADMIN — NICOTINE 1 PATCH: 21 PATCH, EXTENDED RELEASE TRANSDERMAL at 08:58

## 2024-03-24 RX ADMIN — MELOXICAM 15 MG: 7.5 TABLET ORAL at 08:43

## 2024-03-24 RX ADMIN — SENNOSIDES AND DOCUSATE SODIUM 1 TABLET: 8.6; 5 TABLET ORAL at 14:02

## 2024-03-24 RX ADMIN — LORAZEPAM 0.5 MG: 0.5 TABLET ORAL at 20:53

## 2024-03-24 RX ADMIN — BENZTROPINE MESYLATE 1 MG: 1 TABLET ORAL at 15:19

## 2024-03-24 RX ADMIN — FOLIC ACID 1 MG: 1 TABLET ORAL at 08:43

## 2024-03-24 RX ADMIN — GUAIFENESIN 600 MG: 600 TABLET ORAL at 08:43

## 2024-03-24 RX ADMIN — RISPERIDONE 1 MG: 1 TABLET ORAL at 11:50

## 2024-03-24 RX ADMIN — LIDOCAINE 5% 1 PATCH: 700 PATCH TOPICAL at 08:42

## 2024-03-24 NOTE — PROGRESS NOTES
Progress Note - Jo-Ann Lamb 53 y.o. female MRN: [id number]    Unit/Bed#: [facility] 256-01 Encounter: [id number]        Subjective:   Patient seen and examined at bedside after reviewing the chart and discussing the case with the caring staff.      Patient examined at bedside.  Patient has no acute symptoms. Staff note to look at callus of R great toe. Patient denies discomfort, more bothersome to her. States there is raw skin below callus.     Physical Exam   Vitals: Blood pressure 118/79, pulse 82, temperature 97.5 °F (36.4 °C), temperature source Temporal, resp. rate 16, height 5' (1.524 m), weight 64.4 kg (142 lb), last menstrual period 01/01/2020, SpO2 96%, not currently breastfeeding.,Body mass index is 27.73 kg/m².  Constitutional: Patient in no acute distress.  HEENT: PERR, EOMI, MMM.  Cardiovascular: Normal rate and regular rhythm.    Pulmonary/Chest: Effort normal and breath sounds normal.   Abdomen: Soft, + BS, NT.  R great toe, callus: Significant layer of extra skin.     Assessment/Plan:  Jo-Ann Lamb is a(n) 53 y.o. year old female with Schizoaffective DO.      Hx of asthma. Patient on Symbicort inhaler twice daily and albuterol as needed.   Hypertriglyceridemia. Patient on Fenofibrate 145 mg daily. Lipid panel 3/23/24: cholesterol 221, triglycerides 211, . Consider starting statin. Discuss with patient tomorrow.  Allergic rhinitis. Patient on Flonase daily.   GERD. Patient on Protonix 40 mg daily.   Tobacco abuse. NRT.   Alcohol abuse. Start patient on thiamine 100 mg, folic acid 1 mg, and multivitamin daily.   Chronic back pain. Patient on Mobic 15 mg daily, Robaxin 500 mg twice daily, and lidocaine patch daily.   Vitamin D deficiency. Continue daily supplement.   Cough/ congestion. Mucinex twice daily as needed.  Callus of R great toe. Encouraged pt to f/u with podiatry outpatient.     The patient was discussed with Dr. Steel and he is in agreement with the above note.

## 2024-03-24 NOTE — PLAN OF CARE
Problem: Depression  Goal: Refrain from self-neglect  Outcome: Progressing     Problem: Alteration in Thoughts and Perception  Goal: Complete daily ADLs, including personal hygiene independently, as able  Description: Interventions:  - Observe, teach, and assist patient with ADLS  - Monitor and promote a balance of rest/activity, with adequate nutrition and elimination   Outcome: Progressing

## 2024-03-24 NOTE — NURSING NOTE
Patient visible on unit.Guarded.Blunted affect.Circumstantial.Paranoid delusion.Active hallucination. Administered 1 x dose of  Risperdal 1 mg per order. Pleasant.Schedule PO medication administered as ordered.Denies HI, SI.Appetite good. Attend group.Continue on safety check.

## 2024-03-24 NOTE — NURSING NOTE
Patient was isolative, mostly withdrawn to her room. Patient was labile, complied with medications and meal, but did endorse mild depression and moderate anxiety. Staff maintained Q 7 safety checks, administered medications as prescribed and assisted as needed. We will continue to monitor, support and encourage.

## 2024-03-25 PROCEDURE — 99232 SBSQ HOSP IP/OBS MODERATE 35: CPT | Performed by: HOSPITALIST

## 2024-03-25 RX ORDER — OLANZAPINE 5 MG/1
5 TABLET ORAL
Status: DISCONTINUED | OUTPATIENT
Start: 2024-03-25 | End: 2024-03-26

## 2024-03-25 RX ORDER — OLANZAPINE 2.5 MG/1
2.5 TABLET, FILM COATED ORAL
Status: DISCONTINUED | OUTPATIENT
Start: 2024-03-25 | End: 2024-03-26

## 2024-03-25 RX ORDER — OLANZAPINE 10 MG/1
10 TABLET ORAL
Status: DISCONTINUED | OUTPATIENT
Start: 2024-03-25 | End: 2024-03-26

## 2024-03-25 RX ADMIN — PANTOPRAZOLE SODIUM 40 MG: 40 TABLET, DELAYED RELEASE ORAL at 08:10

## 2024-03-25 RX ADMIN — VENLAFAXINE HYDROCHLORIDE 150 MG: 150 CAPSULE, EXTENDED RELEASE ORAL at 08:11

## 2024-03-25 RX ADMIN — THIAMINE HCL TAB 100 MG 100 MG: 100 TAB at 08:11

## 2024-03-25 RX ADMIN — Medication 3 MG: at 20:43

## 2024-03-25 RX ADMIN — LORAZEPAM 1 MG: 1 TABLET ORAL at 17:20

## 2024-03-25 RX ADMIN — FOLIC ACID 1 MG: 1 TABLET ORAL at 08:10

## 2024-03-25 RX ADMIN — RISPERIDONE 1.5 MG: 1 TABLET ORAL at 17:20

## 2024-03-25 RX ADMIN — FENOFIBRATE 145 MG: 145 TABLET, FILM COATED ORAL at 08:10

## 2024-03-25 RX ADMIN — METHOCARBAMOL TABLETS 500 MG: 500 TABLET, COATED ORAL at 08:10

## 2024-03-25 RX ADMIN — NICOTINE 1 PATCH: 21 PATCH, EXTENDED RELEASE TRANSDERMAL at 11:33

## 2024-03-25 RX ADMIN — Medication 1 TABLET: at 08:10

## 2024-03-25 RX ADMIN — CLOZAPINE 350 MG: 100 TABLET ORAL at 20:42

## 2024-03-25 RX ADMIN — CHOLECALCIFEROL TAB 25 MCG (1000 UNIT) 1000 UNITS: 25 TAB at 08:10

## 2024-03-25 RX ADMIN — SENNOSIDES AND DOCUSATE SODIUM 1 TABLET: 8.6; 5 TABLET ORAL at 13:57

## 2024-03-25 RX ADMIN — LAMOTRIGINE 100 MG: 100 TABLET ORAL at 17:19

## 2024-03-25 RX ADMIN — METHOCARBAMOL TABLETS 500 MG: 500 TABLET, COATED ORAL at 17:19

## 2024-03-25 RX ADMIN — MELOXICAM 15 MG: 7.5 TABLET ORAL at 08:10

## 2024-03-25 RX ADMIN — LAMOTRIGINE 100 MG: 100 TABLET ORAL at 08:10

## 2024-03-25 RX ADMIN — BISACODYL 10 MG: 10 SUPPOSITORY RECTAL at 19:59

## 2024-03-25 RX ADMIN — BUDESONIDE AND FORMOTEROL FUMARATE DIHYDRATE 2 PUFF: 160; 4.5 AEROSOL RESPIRATORY (INHALATION) at 10:05

## 2024-03-25 RX ADMIN — LIDOCAINE 5% 1 PATCH: 700 PATCH TOPICAL at 11:33

## 2024-03-25 RX ADMIN — OLANZAPINE 5 MG: 5 TABLET, FILM COATED ORAL at 15:00

## 2024-03-25 RX ADMIN — FLUTICASONE PROPIONATE 1 SPRAY: 50 SPRAY, METERED NASAL at 10:05

## 2024-03-25 RX ADMIN — BUDESONIDE AND FORMOTEROL FUMARATE DIHYDRATE 2 PUFF: 160; 4.5 AEROSOL RESPIRATORY (INHALATION) at 17:29

## 2024-03-25 RX ADMIN — RISPERIDONE 1.5 MG: 1 TABLET ORAL at 08:10

## 2024-03-25 RX ADMIN — GUAIFENESIN 600 MG: 600 TABLET ORAL at 20:42

## 2024-03-25 RX ADMIN — LORAZEPAM 0.5 MG: 0.5 TABLET ORAL at 11:33

## 2024-03-25 RX ADMIN — GUAIFENESIN 600 MG: 600 TABLET ORAL at 08:10

## 2024-03-25 RX ADMIN — POLYETHYLENE GLYCOL 3350 17 G: 17 POWDER, FOR SOLUTION ORAL at 16:38

## 2024-03-25 NOTE — PROGRESS NOTES
Progress Note - Jo-Ann Lamb 53 y.o. female MRN: [id number]    Unit/Bed#: [facility] 256-01 Encounter: [id number]        Subjective:   Patient seen and examined at bedside after reviewing the chart and discussing the case with the caring staff.      Patient examined at bedside.  Patient has no acute symptoms.     Physical Exam   Vitals: Blood pressure 150/84, pulse 86, temperature 97.5 °F (36.4 °C), temperature source Temporal, resp. rate 16, height 5' (1.524 m), weight 64.4 kg (142 lb), last menstrual period 01/01/2020, SpO2 92%, not currently breastfeeding.,Body mass index is 27.73 kg/m².  Constitutional: Patient in no acute distress.  HEENT: PERR, EOMI, MMM.  Cardiovascular: Normal rate and regular rhythm.    Pulmonary/Chest: Effort normal and breath sounds normal.   Abdomen: Soft, + BS, NT.  R great toe, callus: Significant layer of extra skin.     Assessment/Plan:  Jo-Ann Lamb is a(n) 53 y.o. year old female with Schizoaffective DO.      Hx of asthma. Patient on Symbicort inhaler twice daily and albuterol as needed.   Hypertriglyceridemia. Patient on Fenofibrate 145 mg daily. Lipid panel 3/23/24: cholesterol 221, triglycerides 211, . Consider starting statin. Discuss with patient tomorrow.  Allergic rhinitis. Patient on Flonase daily.   GERD. Patient on Protonix 40 mg daily.   Tobacco abuse. NRT.   Alcohol abuse. Start patient on thiamine 100 mg, folic acid 1 mg, and multivitamin daily.   Chronic back pain. Patient on Mobic 15 mg daily, Robaxin 500 mg twice daily, and lidocaine patch daily.   Vitamin D deficiency. Continue daily supplement.   Cough/ congestion. Mucinex twice daily as needed.  Callus of R great toe. Encouraged pt to f/u with podiatry outpatient.     The patient was discussed with Dr. Steel and he is in agreement with the above note.

## 2024-03-25 NOTE — NURSING NOTE
Patient visible on the unit, intermittently social with select peers. Patient endorses AH but took PRN zyprexa which was effective. Attended meals & groups in the dayroom. Pleasant & cooperative with staff. Denies SI/HI. Q 7 minute checks maintained.

## 2024-03-25 NOTE — SOCIAL WORK
Per Anuja Segundo <[email or web address]>, The visit with Jo-Ann went well ?? She seemed to have some increased insight and reported her clozaril was increased and she had been taking a prn of Risperdal in addition to regular dose. I am her primary point of contact. She meets with me for substance abuse counseling on Mondays, our RN Dandre on Tuesdays, and our  on Fridays. Our office number is [phone]- the  can also answer any questions you might have.

## 2024-03-25 NOTE — SOCIAL WORK
Anuja Segundo [email or web address]> states ACT RN Dandre to visit pt tomorrow AM in person.

## 2024-03-25 NOTE — DISCHARGE INSTR - OTHER ORDERS
You are being discharged to your independent living home lcoated at [address] [facility] [address], [address], [facility] [address]. Patient Phone: [phone].     Triggers you have identified during your hospitalization that led to your admission of a distressed mood includes ineffective coping skills and unstable mental health. Coping skills you have identified during your hospitalization include music and art. If you are unable to deal with your distressed mood alone please contact Americo Newton (ACT ) [phone] . If that is not effective and you continue to have a distressed mood, are overwhelmed, or in crisis, please contact (Crisis #) [facility] [phone], dial 330 or go to the nearest emergency center.      *[facility] Crisis Hotline: 419.579.6931  *[place] Suicide Prevention Lifeline:  1-782.679.5775  *Alcohol Anonymous: 289.195.5963  *[facility] Drug & Alcohol Commission: (385) 822-7423  *National [employer] on Mental Illness (MARIANA) HELPLINE: 390.673.2983/Website: www.mariana.org  *Substance Abuse and Mental Health Services Administration([facility]) National Helpline, which is a confidential, free, 24-hour-a-day, 365-day-a-year, information service for individuals and family members facing mental health and/or substance use disorders. This service provides referrals to local treatment facilities, support groups, and community-based organizations. Callers can also order free publications and other information.  Call 1-540.946.5743/Website: www.[facility].gov  *[facility] 2-1-1: This is a toll free, confidential, 24-hour-a-day service which connects you to a community  in your area who can help you find services and resources that are available to you locally and provide critical services that can improve and save lives.  Call: 211  /Website: http://www.DocRun.org/     You declined the need for a primary care provider appointment and drug & alcohol treatment at this time.     gideon Mccrary  Mayi, our Behavioral Health Nurse Navigators, will be calling you after your discharge, on the phone number that you provided.  They will be available as an additional support, if needed.   If you wish to speak with one of them, you may contact Demetra at [phone] or Mayi at [phone].       Outpatient Drug & Alcohol Treatment   The [facility] currently operates an outpatient treatment unit:  [facility] in [place], [place] as a functional unit of the [facility]  The Outpatient treatment units are licensed by the [place] Department of Drug & Alcohol Programs to provide individual and group counseling for those with substance abuse and dependency problems. The clinical staff is experienced in a variety of therapeutic modalities and provides treatment that is individualized to meet the particular needs of each person. These units are drug-free treatment programs.  The [facility] accepts most major healthcare insurance coverage plans, [place] Medical Assistance and in those cases where the consumer has no third party healthcare coverage, a liberal sliding fee schedule is utilized. The length of service and type of outpatient service provided is based on the results of the Level of Care Assessment            There are currently three treatment protocols available:  Outpatient  Intensive Outpatient  Contracted services for Partial Hospitalization  Therapy is provided in both Individual and Group counseling formats. The Outpatient department offers individual counseling for the family members of substance abusers to address co-dependent and enabling behaviors.    Outpatient treatment services in [facility] are purchased through a fee-for-service subcontract with:  [place] [facility]  [address] [place] [address]   Phone: [phone]    [facility] Outpatient  [facility], [address]  [place] [address]   New Admissions [phone]  [facility]  [phone]    Outpatient treatment services in [facility] are purchased through fee-for-service subcontracts with:  [facility]  [address]  [facility] [place] [address]  Phone: [phone]  [facility] Outpatient  [address]  [place] [address]  Phone: New Admissions [phone] / Local Office [phone]  Outpatient treatment services are also available to [facility] residents in our [facility] Office.

## 2024-03-25 NOTE — PROGRESS NOTES
Progress Note - Behavioral Health     Jo-Ann Lamb 53 y.o. female MRN: [id number]   Unit/Bed#: [facility] 256-01 Encounter: [id number]    Behavior over the last 24 hours: improving.     Jo-Ann CUMMINGS seen today, per staff report has been anxious on the unit.  Patient seen today with nursing staff case management.  Continues to feel anxious, reports auditory hallucinations are improving.  Continues to have negativistic outlook.  Reports thoughts of self-harm due to auditory hallucinations and daily stress.  Reports having thoughts to drink herself to death. Denies current SI. Appears anxious and fearful.         Mental Status Evaluation:    Appearance:  casually dressed, marginal hygiene, looks stated age   Behavior:  cooperative, restless   Speech:  slow, soft   Mood:  anxious   Affect:  blunted   Thought Process:  circumstantial, decreased rate of thoughts, negative thinking   Associations: concrete associations   Thought Content:  paranoid delusions   Perceptual Disturbances: no auditory hallucinations, appears preoccupied   Risk Potential: Suicidal ideation - None  Homicidal ideation - None   Sensorium:  oriented to person, place, and time/date   Memory:  recent and remote memory grossly intact   Consciousness:  alert and awake   Attention: decreased concentration and decreased attention span   Insight:  impaired   Judgment: impaired   Gait/Station: normal gait/station   Motor Activity: no abnormal movements     Vital signs in last 24 hours:    Temp:  [97.5 °F (36.4 °C)] 97.5 °F (36.4 °C)  HR:  [79-86] 86  Resp:  [16] 16  BP: (113-150)/(84-86) 150/84    Laboratory results: I have personally reviewed all pertinent laboratory/tests results.        Assessment/Plan   Principal Problem:    Schizoaffective disorder, bipolar type (HCC)    Recommended Treatment:     Planned medication and treatment changes:  Patient had increase of Clozaril to 350 mg (from 300 mg daily) at admission.  Continue Lamictal 100 mg twice daily  Continue  Ativan 1 mg in the evening  Continue melatonin 3 mg at bedtime  Continue Effexor  mg daily  Continue Risperdal 1.5 mg twice daily  All current active medications have been reviewed  Encourage group therapy, milieu therapy and occupational therapy  Behavioral Health checks every 7 minutes  Current Facility-Administered Medications   Medication Dose Route Frequency Provider Last Rate    acetaminophen  650 mg Oral Q6H PRN Taylor Castro MD      acetaminophen  650 mg Oral Q4H PRN Taylor Castro MD      acetaminophen  975 mg Oral Q6H PRN Taylor Castro MD      albuterol  2 puff Inhalation Q4H PRN Zandra Barclay PA-C      aluminum-magnesium hydroxide-simethicone  30 mL Oral Q4H PRN Taylor Castro MD      haloperidol lactate  2.5 mg Intramuscular Q4H PRN Max 4/day Taylor Castro MD      And    LORazepam  1 mg Intramuscular Q4H PRN Max 4/day Taylor Castro MD      And    benztropine  0.5 mg Intramuscular Q4H PRN Max 4/day Taylor Castro MD      haloperidol lactate  5 mg Intramuscular Q4H PRN Max 4/day Taylor Castro MD      And    LORazepam  2 mg Intramuscular Q4H PRN Max 4/day Taylor Castro MD      And    benztropine  1 mg Intramuscular Q4H PRN Max 4/day Taylor Castro MD      benztropine  1 mg Oral Q4H PRN Max 6/day Taylor Castro MD      bisacodyl  10 mg Rectal Daily PRN Taylor Castro MD      budesonide-formoterol  2 puff Inhalation BID Zandra Barclay PA-C      cholecalciferol  1,000 Units Oral QAM Zandra Barclay PA-C      cloZAPine  350 mg Oral HS Vadim Dakota,       hydrOXYzine HCL  50 mg Oral Q6H PRN Max 4/day Taylor Castro MD      Or    diphenhydrAMINE  50 mg Intramuscular Q6H PRN Taylor Castro MD      fenofibrate  145 mg Oral Daily Zandra Barclay PA-C      fluticasone  1 spray Each Nare Daily Zandra Barclay PA-C      folic acid  1 mg Oral Daily Zandra Barclay PA-C      guaiFENesin  600 mg Oral Q12H [facility] Zandra  Devon Barclay PA-C      haloperidol  1 mg Oral Q6H PRN Taylor Castro MD      haloperidol  2.5 mg Oral Q4H PRN Max 4/day Taylor Castro MD      haloperidol  5 mg Oral Q4H PRN Max 4/day Taylor Castro MD      hydrOXYzine HCL  100 mg Oral Q6H PRN Max 4/day Taylor Castro MD      Or    LORazepam  2 mg Intramuscular Q6H PRN Taylor Castro MD      hydrOXYzine HCL  25 mg Oral Q6H PRN Max 4/day Taylor Castro MD      lamoTRIgine  100 mg Oral BID Vadim Duncan, DO      lidocaine  1 patch Topical Daily Zandra Barclay PA-C      LORazepam  0.5 mg Oral Q8H PRN Vadim Duncan, DO      LORazepam  1 mg Oral QPM Vadim Duncan, DO      melatonin  3 mg Oral HS Taylor Castro MD      meloxicam  15 mg Oral Daily Zandra Barclay PA-C      methocarbamol  500 mg Oral BID Zandra Barclay PA-C      multivitamin-minerals  1 tablet Oral Daily Zandra Barclay PA-C      nicotine  1 patch Transdermal Daily Vadim Duncan, DO      nicotine polacrilex  4 mg Oral Q2H PRN Taylor Castro MD      pantoprazole  40 mg Oral Daily Before Breakfast Zandra Barclay PA-C      polyethylene glycol  17 g Oral Daily PRN Taylor Castro MD      risperiDONE  1.5 mg Oral BID Vadim Duncan, DO      senna-docusate sodium  1 tablet Oral Daily PRN Taylor Castro MD      thiamine  100 mg Oral Daily Zandra Barclay PA-C      traZODone  50 mg Oral HS PRN Taylor Castro MD      venlafaxine  150 mg Oral Daily Vadim Duncan, DO         Risks / Benefits of Treatment:    Risks, benefits, and possible side effects of medications explained to patient and patient verbalizes understanding and agreement for treatment.    Counseling / Coordination of Care:    Total floor / unit time spent today 35 minutes. Greater than 50% of total time was spent with the patient and / or family counseling and / or coordination of care. A description of counseling / coordination of care:  Patient's progress discussed with staff in  treatment team meeting.  Medications, treatment progress and treatment plan reviewed with patient.    Leny Cardenas MD 03/25/24

## 2024-03-25 NOTE — PLAN OF CARE
Problem: Alteration in Thoughts and Perception  Goal: Recognize dysfunctional thoughts, communicate reality-based thoughts at the time of discharge  Description: Interventions:  - Provide medication and psycho-education to assist patient in compliance and developing insight into his/her illness   Outcome: Progressing     Problem: Alteration in Thoughts and Perception  Goal: Refrain from acting on delusional thinking/internal stimuli  Description: Interventions:  - Monitor patient closely, per order   - Utilize least restrictive measures   - Set reasonable limits, give positive feedback for acceptable   - Administer medications as ordered and monitor of potential side effects  Outcome: Not Progressing

## 2024-03-25 NOTE — SOCIAL WORK
Anuja Segundo <[email or web address]> contacted for admission details.

## 2024-03-25 NOTE — PROGRESS NOTES
03/25/24 0949   Team Meeting   Meeting Type Tx Team Meeting   Team Members Present   Team Members Present Physician;;Nurse   Physician Team Member Dr Ronald LARSON   Nursing Team Member Agustin JIMENES   Social Work Team Member Abelardo HOPSON   Patient/Family Present   Patient Present Yes   Patient's Family Present No     Patient and treatment team met and reviewed pt strengths, limitations, coping skills, treatment plan and goals; pt agreeable and signed; copy on chart

## 2024-03-25 NOTE — CASE MANAGEMENT
CM placed phone call to patient's psych provider, [facility]: [phone] for admission notification. VM message left requesting a return call.    CM placed phone call to patient's sister, Nancy Lamb: [phone] for family notification. VM message left with relevant phone numbers and request for call back to .    CM placed phone call to patient's PCP: Dr. Navaoline: [phone] for admission notification. Spoke with staff who reported patient's exixting upcoming appt scheduled for 3/28/24. Appt cancelled; CM will office to schedule a transition of care appt at the time of patient d/c.

## 2024-03-25 NOTE — PROGRESS NOTES
03/25/24   Team Meeting   Meeting Type Daily Rounds   Team Members Present   Team Members Present Physician;Nurse;Occupational Therapist;   Physician Team Member Dr Ronald LARSON; Yair COLEMAN   Nursing Team Member Agustin JIMENES   Social Work Team Member Abelardo HOPSON; Kirk HOPSON   OT Team Member Gurjit HUITRON   Patient/Family Present   Patient Present No   Patient's Family Present No     New 201, increased AH, paranoid, SI for several weeks, SA by drinking ETOH, last AIP Oct, anxious, restless, verbal outbursts needing redirection, Clozaril protocol, has ACT team; no dc date.

## 2024-03-25 NOTE — PROGRESS NOTES
03/25/24 1330   Activity/Group Checklist   Group   (Self Assessment)   Attendance Attended   Attendance Duration (min) 0-15   Interactions Interacted appropriately   Affect/Mood Appropriate;Calm   Goals Achieved Identified feelings;Identified triggers;Discussed coping strategies;Able to listen to others;Able to engage in interactions;Able to reflect/comment on own behavior;Able to manage/cope with feelings     AT met with pt and pt remembered AT from multiple prior stays on the unit. PT was pleasant and cooperative and filled out the assessment form. PT identified her biggest stressor leading to this admission as the stress of her voices and mind control. PT enjoys music, singing, exercise and writing. PT would like to learn more about increasing her self esteem, being more assertive, healthy coping skills and helping her family to understand her struggles while she is here in the hospital.

## 2024-03-25 NOTE — NURSING NOTE
Pt found to be resting quietly on most q 7 min checks. No acute behavioral issues noted. Q 7 min checks ongoing.

## 2024-03-25 NOTE — NURSING NOTE
Patient was isolative,withdrawn into her room most of the shift. Patient complained of anxiety ,given Ativan 0.5 mg on jerry scale of 18. Patient complied with medications and meals, denies everything but does display paranoid ideations. Staff maintained Q 7 safety checks,administered medications as prescribed and assisted as needed. We will continue to monitor, support and encourage.
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
## 2024-03-25 NOTE — NURSING NOTE
"Patient requesting PRN for psychosis, patient received PRN Zyprexa 5mg PO at 1500. Per patient \"I think it's helping\". Patient resting in room. Q 7 minute checks maintained.   "

## 2024-03-26 PROCEDURE — 99232 SBSQ HOSP IP/OBS MODERATE 35: CPT | Performed by: HOSPITALIST

## 2024-03-26 RX ORDER — RISPERIDONE 1 MG/1
1 TABLET, ORALLY DISINTEGRATING ORAL
Status: DISCONTINUED | OUTPATIENT
Start: 2024-03-26 | End: 2024-04-10 | Stop reason: HOSPADM

## 2024-03-26 RX ORDER — RISPERIDONE 2 MG/1
2 TABLET ORAL 2 TIMES DAILY
Status: DISCONTINUED | OUTPATIENT
Start: 2024-03-26 | End: 2024-03-28

## 2024-03-26 RX ORDER — LACTULOSE 10 G/15ML
20 SOLUTION ORAL DAILY PRN
Status: DISCONTINUED | OUTPATIENT
Start: 2024-03-26 | End: 2024-04-10 | Stop reason: HOSPADM

## 2024-03-26 RX ORDER — HYDROCORTISONE 25 MG/G
CREAM TOPICAL DAILY
Status: DISCONTINUED | OUTPATIENT
Start: 2024-03-26 | End: 2024-03-27

## 2024-03-26 RX ORDER — AMOXICILLIN 250 MG
1 CAPSULE ORAL 2 TIMES DAILY
Status: DISCONTINUED | OUTPATIENT
Start: 2024-03-26 | End: 2024-04-10 | Stop reason: HOSPADM

## 2024-03-26 RX ADMIN — RISPERIDONE 1.5 MG: 1 TABLET ORAL at 08:27

## 2024-03-26 RX ADMIN — METHOCARBAMOL TABLETS 500 MG: 500 TABLET, COATED ORAL at 08:28

## 2024-03-26 RX ADMIN — Medication 1 TABLET: at 08:28

## 2024-03-26 RX ADMIN — SENNOSIDES AND DOCUSATE SODIUM 1 TABLET: 8.6; 5 TABLET ORAL at 17:30

## 2024-03-26 RX ADMIN — FLUTICASONE PROPIONATE 1 SPRAY: 50 SPRAY, METERED NASAL at 08:28

## 2024-03-26 RX ADMIN — LIDOCAINE 5% 1 PATCH: 700 PATCH TOPICAL at 11:50

## 2024-03-26 RX ADMIN — CHOLECALCIFEROL TAB 25 MCG (1000 UNIT) 1000 UNITS: 25 TAB at 08:28

## 2024-03-26 RX ADMIN — GUAIFENESIN 600 MG: 600 TABLET ORAL at 20:44

## 2024-03-26 RX ADMIN — SENNOSIDES AND DOCUSATE SODIUM 1 TABLET: 8.6; 5 TABLET ORAL at 11:34

## 2024-03-26 RX ADMIN — CLOZAPINE 350 MG: 100 TABLET ORAL at 20:44

## 2024-03-26 RX ADMIN — LAMOTRIGINE 100 MG: 100 TABLET ORAL at 17:30

## 2024-03-26 RX ADMIN — BUDESONIDE AND FORMOTEROL FUMARATE DIHYDRATE 2 PUFF: 160; 4.5 AEROSOL RESPIRATORY (INHALATION) at 18:25

## 2024-03-26 RX ADMIN — METHOCARBAMOL TABLETS 500 MG: 500 TABLET, COATED ORAL at 17:30

## 2024-03-26 RX ADMIN — BUDESONIDE AND FORMOTEROL FUMARATE DIHYDRATE 2 PUFF: 160; 4.5 AEROSOL RESPIRATORY (INHALATION) at 08:28

## 2024-03-26 RX ADMIN — RISPERIDONE 1 MG: 1 TABLET, ORALLY DISINTEGRATING ORAL at 15:30

## 2024-03-26 RX ADMIN — LAMOTRIGINE 100 MG: 100 TABLET ORAL at 08:28

## 2024-03-26 RX ADMIN — THIAMINE HCL TAB 100 MG 100 MG: 100 TAB at 08:28

## 2024-03-26 RX ADMIN — GUAIFENESIN 600 MG: 600 TABLET ORAL at 08:28

## 2024-03-26 RX ADMIN — MELOXICAM 15 MG: 7.5 TABLET ORAL at 08:27

## 2024-03-26 RX ADMIN — BENZTROPINE MESYLATE 1 MG: 1 TABLET ORAL at 20:44

## 2024-03-26 RX ADMIN — PANTOPRAZOLE SODIUM 40 MG: 40 TABLET, DELAYED RELEASE ORAL at 08:28

## 2024-03-26 RX ADMIN — OLANZAPINE 10 MG: 10 TABLET, FILM COATED ORAL at 12:34

## 2024-03-26 RX ADMIN — NICOTINE 1 PATCH: 21 PATCH, EXTENDED RELEASE TRANSDERMAL at 11:50

## 2024-03-26 RX ADMIN — VENLAFAXINE HYDROCHLORIDE 150 MG: 150 CAPSULE, EXTENDED RELEASE ORAL at 08:28

## 2024-03-26 RX ADMIN — FOLIC ACID 1 MG: 1 TABLET ORAL at 08:28

## 2024-03-26 RX ADMIN — FENOFIBRATE 145 MG: 145 TABLET, FILM COATED ORAL at 08:28

## 2024-03-26 RX ADMIN — LORAZEPAM 1 MG: 1 TABLET ORAL at 17:30

## 2024-03-26 NOTE — CASE MANAGEMENT
"CM met with patient for status update. The patient was observed in the dining room sitting with peers. Mood remains guarded; affect blunted. Thinking somewhat concrete with delay. Endorsed continued AH of a command nature as well as suicidal thoughts without intent or plan. Agreed to notify staff of any changes. Stated she feels \"safe in the hospital.\" Mood continues to lack spontaneity,reporting mild depression/ anxiety. Reported expected d/c next week, though uncertain of readiness, stating \"I'm still hearing voices.\" Agreeable to utilize skills helpful in refocusing thoughts patterns, participate in therapies offered and report sx as they present.  "

## 2024-03-26 NOTE — PROGRESS NOTES
Progress Note - Jo-Ann Lamb 53 y.o. female MRN: [id number]    Unit/Bed#: [facility] 256-01 Encounter: [id number]        Subjective:   Patient seen and examined at bedside after reviewing the chart and discussing the case with the caring staff.      Patient examined at bedside.  Patient is requesting hemorrhoid cream and Sorbitol as needed. This is what she takes at home for constipation. Denies any acute symptoms.     Physical Exam   Vitals: Blood pressure 138/79, pulse 75, temperature 98 °F (36.7 °C), temperature source Temporal, resp. rate 17, height 5' (1.524 m), weight 64.4 kg (142 lb), last menstrual period 01/01/2020, SpO2 98%, not currently breastfeeding.,Body mass index is 27.73 kg/m².  Constitutional: Patient in no acute distress.  HEENT: PERR, EOMI, MMM.  Cardiovascular: Normal rate and regular rhythm.    Pulmonary/Chest: Effort normal and breath sounds normal.   Abdomen: Soft, + BS, NT.  R great toe, callus: Significant layer of extra skin.     Assessment/Plan:  Jo-Ann Lamb is a(n) 53 y.o. year old female with Schizoaffective DO.      Hx of asthma. Patient on Symbicort inhaler twice daily and albuterol as needed.   Hypertriglyceridemia. Patient on Fenofibrate 145 mg daily. Lipid panel 3/23/24: cholesterol 221, triglycerides 211, . Consider Statin. F/up with PCP outpt.   Allergic rhinitis. Patient on Flonase daily.   GERD. Patient on Protonix 40 mg daily.   Tobacco abuse. NRT.   Alcohol abuse. Start patient on thiamine 100 mg, folic acid 1 mg, and multivitamin daily.   Chronic back pain. Patient on Mobic 15 mg daily, Robaxin 500 mg twice daily, and lidocaine patch daily.   Vitamin D deficiency. Continue daily supplement.   Cough/ congestion. Mucinex twice daily as needed.  Callus of R great toe. Encouraged pt to f/u with podiatry outpatient.   Hemorrhoids/constipation. Apply Anusol daily. Lactulose 20 mg as needed (pt requesting sorbitol).    The patient was discussed with Dr. Steel and he is in agreement  with the above note.

## 2024-03-26 NOTE — NURSING NOTE
Patient visible on unit. Social with select peers. Dulcolax suppository given at 1959 negative results. Patient pleasant and cooperative. Bright on approach. Denies SI,HI,AVH, anxiety and depression. Safety checks continue Q 7 minutes.

## 2024-03-26 NOTE — NURSING NOTE
"Patient came to nurses station c/o psychosis/agitation. Patient stating \"I'm going to freak the fuck out\". Broset of 3. Zyprexa 10mg PO administered. Will monitor effect.   "

## 2024-03-26 NOTE — PROGRESS NOTES
"Progress Note - Behavioral Health   Jo-Ann Lamb 53 y.o. female MRN: [id number]  Unit/Bed#: [facility] 256-01 Encounter: [id number]    Assessment/Plan   Principal Problem:    Schizoaffective disorder, bipolar type (HCC)      Behavior over the last 24 hours:  unchanged  Sleep: normal  Appetite: Fair  Medication side effects: No  ROS: no complaints and all other systems are negative    Jo-Ann has been intermittently visible in the milieu and continues to endorse mood lability and intermittent AH.  Denies SI and has been maintaining safety on unit.  Utilizing PRN antipsychotics frequently for return of AH when she becomes \"stressed.\"  Guarded, but cooperative in conversation.    Mental Status Evaluation:  Appearance:  Wearing pajamas, blonde hair, lying in bed   Behavior:  Guarded, isolative   Speech:  normal pitch and normal volume   Mood:  labile   Affect:  mood-congruent and redirectable   Thought Process:  circumstantial   Associations: circumstantial associations   Thought Content:  paranoid delusions   Perceptual Disturbances: Denied AVH, but endorses they \"come and go,\" noncommanding in nature   Risk Potential: Suicidal Ideations none at present  Homicidal Ideations none  Potential for Aggression No   Sensorium:  person, place, and time/date   Memory:  recent and remote memory grossly intact   Consciousness:  alert and awake    Attention: attention span appeared shorter than expected for age   Insight:  limited   Judgment: limited   Gait/Station: normal gait/station and normal balance   Motor Activity: no abnormal movements     Progress Toward Goals: Mood lability and paranoia persists.  Requires frequent PRNs for agitation, anxiety, and crease intensity/frequency of AVH.  Plan is to obtain Clozaril level with weekly CBCD 3/28/24 and titrate as needed for symptoms of psychosis and mood instability.  In the meantime, will titrate Risperdal 2 mg PO BID. Utilizing 2 antipsychotics due to history of multiple failed " monotherapy antipsychotic trials and augmentation of Clozaril therapy.  Will also initiate Senna-S 1 tablet PO BID for constipation prophylaxis associated with Clozaril therapy.  Last BM 3/26/2024.  No discharge date at this time.    Recommended Treatment: Continue with group therapy, milieu therapy and occupational therapy.      Risks, benefits and possible side effects of Medications:   Jo-Ann has limited understanding of risk versus benefits of medications, but agrees to take as prescribed.    Medications:   Increase Risperdal 2mg PO BID  Obtain Clozaril level 3/28/2024 at 6 AM with weekly CBCD  all current active meds have been reviewed and current meds:   Current Facility-Administered Medications   Medication Dose Route Frequency    acetaminophen (TYLENOL) tablet 650 mg  650 mg Oral Q6H PRN    acetaminophen (TYLENOL) tablet 650 mg  650 mg Oral Q4H PRN    acetaminophen (TYLENOL) tablet 975 mg  975 mg Oral Q6H PRN    albuterol (PROVENTIL HFA,VENTOLIN HFA) inhaler 2 puff  2 puff Inhalation Q4H PRN    aluminum-magnesium hydroxide-simethicone (MAALOX) oral suspension 30 mL  30 mL Oral Q4H PRN    benztropine (COGENTIN) tablet 1 mg  1 mg Oral Q4H PRN Max 6/day    bisacodyl (DULCOLAX) rectal suppository 10 mg  10 mg Rectal Daily PRN    budesonide-formoterol (SYMBICORT) 160-4.5 mcg/act inhaler 2 puff  2 puff Inhalation BID    cholecalciferol (VITAMIN D3) tablet 1,000 Units  1,000 Units Oral QAM    cloZAPine (CLOZARIL) tablet 350 mg  350 mg Oral HS    hydrOXYzine HCL (ATARAX) tablet 50 mg  50 mg Oral Q6H PRN Max 4/day    Or    diphenhydrAMINE (BENADRYL) injection 50 mg  50 mg Intramuscular Q6H PRN    fenofibrate (TRICOR) tablet 145 mg  145 mg Oral Daily    fluticasone (FLONASE) 50 mcg/act nasal spray 1 spray  1 spray Each Nare Daily    folic acid (FOLVITE) tablet 1 mg  1 mg Oral Daily    guaiFENesin (MUCINEX) 12 hr tablet 600 mg  600 mg Oral Q12H KENNY    hydrocortisone (ANUSOL-HC) 2.5 % rectal cream   Topical Daily     hydrOXYzine HCL (ATARAX) tablet 100 mg  100 mg Oral Q6H PRN Max 4/day    Or    LORazepam (ATIVAN) injection 2 mg  2 mg Intramuscular Q6H PRN    hydrOXYzine HCL (ATARAX) tablet 25 mg  25 mg Oral Q6H PRN Max 4/day    lactulose (CHRONULAC) oral solution 20 g  20 g Oral Daily PRN    lamoTRIgine (LaMICtal) tablet 100 mg  100 mg Oral BID    lidocaine (LIDODERM) 5 % patch 1 patch  1 patch Topical Daily    LORazepam (ATIVAN) tablet 0.5 mg  0.5 mg Oral Q8H PRN    LORazepam (ATIVAN) tablet 1 mg  1 mg Oral QPM    meloxicam (MOBIC) tablet 15 mg  15 mg Oral Daily    methocarbamol (ROBAXIN) tablet 500 mg  500 mg Oral BID    multivitamin-minerals (CENTRUM) tablet 1 tablet  1 tablet Oral Daily    nicotine (NICODERM CQ) 21 mg/24 hr TD 24 hr patch 1 patch  1 patch Transdermal Daily    nicotine polacrilex (NICORETTE) gum 4 mg  4 mg Oral Q2H PRN    pantoprazole (PROTONIX) EC tablet 40 mg  40 mg Oral Daily Before Breakfast    polyethylene glycol (MIRALAX) packet 17 g  17 g Oral Daily PRN    risperiDONE (RisperDAL M-TAB) disintegrating tablet 1 mg  1 mg Oral Q6H PRN Max 3/day    risperiDONE (RisperDAL) tablet 2 mg  2 mg Oral BID    senna-docusate sodium (SENOKOT S) 8.6-50 mg per tablet 1 tablet  1 tablet Oral Daily PRN    senna-docusate sodium (SENOKOT S) 8.6-50 mg per tablet 1 tablet  1 tablet Oral BID    thiamine tablet 100 mg  100 mg Oral Daily    traZODone (DESYREL) tablet 50 mg  50 mg Oral HS PRN    venlafaxine (EFFEXOR-XR) 24 hr capsule 150 mg  150 mg Oral Daily   .    Labs: I have personally reviewed all pertinent laboratory/tests results. CBC:   Lab Results   Component Value Date    WBC 10.66 (H) 03/23/2024    RBC 4.67 03/23/2024    HGB 13.7 03/23/2024    HCT 44.1 03/23/2024    MCV 94 03/23/2024     03/23/2024    MCH 29.3 03/23/2024    MCHC 31.1 (L) 03/23/2024    RDW 15.3 (H) 03/23/2024    MPV 8.9 03/23/2024    NEUTROABS 7.27 03/23/2024     CMP:   Lab Results   Component Value Date    SODIUM 137 03/23/2024    K 4.3  03/23/2024     03/23/2024    CO2 28 03/23/2024    AGAP 8 03/23/2024    BUN 6 03/23/2024    CREATININE 0.56 (L) 03/23/2024    GLUC 88 03/23/2024    GLUF 88 03/23/2024    CALCIUM 8.9 03/23/2024    AST 13 03/23/2024    ALT 10 03/23/2024    ALKPHOS 76 03/23/2024    TP 6.2 (L) 03/23/2024    ALB 3.8 03/23/2024    TBILI 0.18 (L) 03/23/2024    EGFR 107 03/23/2024     Clozapine:   Lab Results   Component Value Date    CLOZAPINE 133 (L) 11/29/2023    NCLOZIP 241 11/29/2023     Drug Screen:   Lab Results   Component Value Date    AMPMETHUR Negative 03/22/2024    BARBTUR Negative 03/22/2024    BDZUR Negative 03/22/2024    THCUR Negative 03/22/2024    COCAINEUR Negative 03/22/2024    METHADONEUR Negative 03/22/2024    OPIATEUR Negative 03/22/2024    PCPUR Negative 03/22/2024     EKG   Lab Results   Component Value Date    VENTRATE 68 03/23/2024    ATRIALRATE 68 03/23/2024    PRINT 152 03/23/2024    QRSDINT 68 03/23/2024    QTINT 440 03/23/2024    QTCINT 467 03/23/2024    PAXIS 58 03/23/2024    QRSAXIS 3 03/23/2024    TWAVEAXIS 50 03/23/2024       Counseling / Coordination of Care  Total floor / unit time spent today 30 minutes. Greater than 50% of total time was spent with the patient and / or family counseling and / or coordination of care. A description of the counseling / coordination of care: Medication education, treatment plan, safety planning

## 2024-03-26 NOTE — NURSING NOTE
"Patient c/o psychosis again, patient irritable. Patient asking for multiple PRN medications, demanding \"ativan\", \"robaxin\", & Risperdal\". Patient educated on use of Ativan & robaxin, patient educated on scheduled doses of Ativan, Risperdal, & Robaxin at 1800. Patient stating \"I'll wait\". Patient then came back to nurse stating \"I'll take the Risperdal\". Patient informed staff will hold 1800 Risperdal 2mg due to PRN being close to the administration time of scheduled & concern for side effects after receiving PRN Zyprexa earlier in the day.\" Patient irritable but accepted PRN Risperdal. Q 7 minute checks maintained.    "

## 2024-03-26 NOTE — NURSING NOTE
Patient visible on the unit intermittently during the day, social with select peers. Patient presents with blunted affect, guarded & minimal with staff. Patient liable at times. Taking medications as prescribed. Continues to endorse AH. Denies SI/HI. Q 7 minute check maintained.

## 2024-03-26 NOTE — PROGRESS NOTES
03/26/24    Team Meeting   Meeting Type Daily Rounds   Team Members Present   Team Members Present Nurse;Occupational Therapist;Physician;   Physician Team Member Dr Ronald LARSON; Yair COLEMAN   Nursing Team Member Agustin JIMENES   Social Work Team Member bAelardo HOPSON   OT Team Member Gurjit HUITRON   Patient/Family Present   Patient Present No   Patient's Family Present No     Denies all psych symptoms, somatic, intrusive, PRN seeking, Clozaril-constipated tx be medical, zyprexa PRN effective; no dc date.

## 2024-03-27 PROCEDURE — 99232 SBSQ HOSP IP/OBS MODERATE 35: CPT | Performed by: NURSE PRACTITIONER

## 2024-03-27 RX ORDER — HYDROCORTISONE 25 MG/G
CREAM TOPICAL 4 TIMES DAILY PRN
Status: DISCONTINUED | OUTPATIENT
Start: 2024-03-27 | End: 2024-04-10 | Stop reason: HOSPADM

## 2024-03-27 RX ADMIN — VENLAFAXINE HYDROCHLORIDE 150 MG: 150 CAPSULE, EXTENDED RELEASE ORAL at 08:45

## 2024-03-27 RX ADMIN — RISPERIDONE 2 MG: 2 TABLET ORAL at 08:45

## 2024-03-27 RX ADMIN — METHOCARBAMOL TABLETS 500 MG: 500 TABLET, COATED ORAL at 17:24

## 2024-03-27 RX ADMIN — LAMOTRIGINE 100 MG: 100 TABLET ORAL at 08:45

## 2024-03-27 RX ADMIN — LORAZEPAM 1 MG: 1 TABLET ORAL at 17:24

## 2024-03-27 RX ADMIN — BUDESONIDE AND FORMOTEROL FUMARATE DIHYDRATE 2 PUFF: 160; 4.5 AEROSOL RESPIRATORY (INHALATION) at 17:24

## 2024-03-27 RX ADMIN — CHOLECALCIFEROL TAB 25 MCG (1000 UNIT) 1000 UNITS: 25 TAB at 08:46

## 2024-03-27 RX ADMIN — FLUTICASONE PROPIONATE 1 SPRAY: 50 SPRAY, METERED NASAL at 08:46

## 2024-03-27 RX ADMIN — FENOFIBRATE 145 MG: 145 TABLET, FILM COATED ORAL at 08:46

## 2024-03-27 RX ADMIN — METHOCARBAMOL TABLETS 500 MG: 500 TABLET, COATED ORAL at 08:46

## 2024-03-27 RX ADMIN — CLOZAPINE 350 MG: 100 TABLET ORAL at 20:46

## 2024-03-27 RX ADMIN — FOLIC ACID 1 MG: 1 TABLET ORAL at 08:45

## 2024-03-27 RX ADMIN — NICOTINE 1 PATCH: 21 PATCH, EXTENDED RELEASE TRANSDERMAL at 10:38

## 2024-03-27 RX ADMIN — GUAIFENESIN 600 MG: 600 TABLET ORAL at 08:45

## 2024-03-27 RX ADMIN — SENNOSIDES AND DOCUSATE SODIUM 1 TABLET: 8.6; 5 TABLET ORAL at 08:45

## 2024-03-27 RX ADMIN — PANTOPRAZOLE SODIUM 40 MG: 40 TABLET, DELAYED RELEASE ORAL at 08:46

## 2024-03-27 RX ADMIN — MELOXICAM 15 MG: 7.5 TABLET ORAL at 08:45

## 2024-03-27 RX ADMIN — RISPERIDONE 2 MG: 2 TABLET ORAL at 17:24

## 2024-03-27 RX ADMIN — BUDESONIDE AND FORMOTEROL FUMARATE DIHYDRATE 2 PUFF: 160; 4.5 AEROSOL RESPIRATORY (INHALATION) at 08:46

## 2024-03-27 RX ADMIN — LAMOTRIGINE 100 MG: 100 TABLET ORAL at 17:24

## 2024-03-27 RX ADMIN — GUAIFENESIN 600 MG: 600 TABLET ORAL at 20:47

## 2024-03-27 RX ADMIN — Medication 1 TABLET: at 08:45

## 2024-03-27 RX ADMIN — THIAMINE HCL TAB 100 MG 100 MG: 100 TAB at 08:45

## 2024-03-27 RX ADMIN — SENNOSIDES AND DOCUSATE SODIUM 1 TABLET: 8.6; 5 TABLET ORAL at 17:24

## 2024-03-27 RX ADMIN — LIDOCAINE 5% 1 PATCH: 700 PATCH TOPICAL at 10:39

## 2024-03-27 RX ADMIN — HYDROCORTISONE: 25 CREAM TOPICAL at 10:40

## 2024-03-27 NOTE — PROGRESS NOTES
Progress Note - Jo-Ann Lamb 53 y.o. female MRN: [id number]    Unit/Bed#: [facility] 256-01 Encounter: [id number]        Subjective:   Patient seen and examined at bedside after reviewing the chart and discussing the case with the caring staff.      Patient examined at bedside.  Patient has no acute symptoms.     Physical Exam   Vitals: Blood pressure 135/76, pulse 80, temperature 97.6 °F (36.4 °C), temperature source Temporal, resp. rate 16, height 5' (1.524 m), weight 64.4 kg (142 lb), last menstrual period 01/01/2020, SpO2 93%, not currently breastfeeding.,Body mass index is 27.73 kg/m².  Constitutional: Patient in no acute distress.  HEENT: PERR, EOMI, MMM.  Cardiovascular: Normal rate and regular rhythm.    Pulmonary/Chest: Effort normal and breath sounds normal.   Abdomen: Soft, + BS, NT.  R great toe, callus: Significant layer of extra skin.     Assessment/Plan:  Jo-Ann Lamb is a(n) 53 y.o. year old female with Schizoaffective DO.      Hx of asthma. Patient on Symbicort inhaler twice daily and albuterol as needed.   Hypertriglyceridemia. Patient on Fenofibrate 145 mg daily. Lipid panel 3/23/24: cholesterol 221, triglycerides 211, . Consider Statin. F/up with PCP outpt.   Allergic rhinitis. Patient on Flonase daily.   GERD. Patient on Protonix 40 mg daily.   Tobacco abuse. NRT.   Alcohol abuse. Start patient on thiamine 100 mg, folic acid 1 mg, and multivitamin daily.   Chronic back pain. Patient on Mobic 15 mg daily, Robaxin 500 mg twice daily, and lidocaine patch daily.   Vitamin D deficiency. Continue daily supplement.   Cough/ congestion. Mucinex twice daily as needed.  Callus of R great toe. Encouraged pt to f/u with podiatry outpatient.   Hemorrhoids/constipation. Apply Anusol daily. Lactulose 20 mg as needed (pt requesting sorbitol).    The patient was discussed with Dr. Steel and he is in agreement with the above note.

## 2024-03-27 NOTE — PROGRESS NOTES
"Progress Note - Behavioral Health   Jo-Ann Lamb 53 y.o. female MRN: [id number]  Unit/Bed#: [facility] 256-01 Encounter: [id number]    Assessment/Plan   Principal Problem:    Schizoaffective disorder, bipolar type (HCC)      Behavior over the last 24 hours:  unchanged  Sleep: normal  Appetite: normal  Medication side effects: No  ROS:  \"my legs feel a little stiff\" and all other systems are negative    Jo-Ann has been visible and participatory milieu activities.  Reports decrease in intensity and frequency of AVH with titration of Risperdal.  According to nursing staff, patient has been seeking multiple PRNs and presents with mood lability.  Patient calm and cooperative during today's encounter.  Receptive to medication education.  Continues to verbalize delusional content regarding \"intercom system stalking me.\"  Agreeable to lab work tomorrow morning to assess Clozaril level.  Preservative on discharge, but responds well to reiteration of treatment plan and medication education.    Mental Status Evaluation:  Appearance:  age appropriate, casually dressed, and blonde hair, well-groomed   Behavior:  Cooperative, slightly guarded redirectable   Speech:  normal pitch and normal volume   Mood:  Anxious   Affect:  constricted, mood-congruent, and less labile   Thought Process:  illogical and perserverative   Associations: circumstantial associations   Thought Content:  Bizarre and paranoid delusions   Perceptual Disturbances: Intermittent AVH, described as \"intercom,\" noncommanding   Risk Potential: Suicidal Ideations none  Homicidal Ideations none  Potential for Aggression Not at present   Sensorium:  person, place, and time/date   Memory:  recent and remote memory grossly intact   Consciousness:  alert and awake    Attention: attention span appeared shorter than expected for age   Insight:  limited   Judgment: limited   Gait/Station: normal gait/station and normal balance   Motor Activity: no abnormal movements     Progress " "Toward Goals: Psychosis persists.  Tolerating titration of Risperdal well; endorses intermittent leg cramping but states this has been ongoing and receives \"muscle relaxers\" for it.  Will refrain from adding Cogentin at this time due to patient being on Clozaril therapy to reduce anticholinergic burden.  Weekly CBCD Clozaril level due tomorrow morning.  Plan is to further titrate Clozaril to therapeutic dosing for treatment of ongoing psychosis and eventually taper Risperdal to discontinuation to reduce polypharmacy and duplicate antipsychotic therapy.  Utilizing 2 antipsychotics currently as augmentation to Clozaril and failed monotherapy trials.  Also discussed possible taper of Effexor to reduce mood instability in the context of schizoaffective disorder.  No discharge date at this time.    Recommended Treatment: Continue with group therapy, milieu therapy and occupational therapy.      Risks, benefits and possible side effects of Medications:   Jo-Ann limited understanding of risk versus benefits of medications, but agrees to take as prescribed.    Medications: all current active meds have been reviewed, continue current psychiatric medications, and current meds:   Current Facility-Administered Medications   Medication Dose Route Frequency    acetaminophen (TYLENOL) tablet 650 mg  650 mg Oral Q6H PRN    acetaminophen (TYLENOL) tablet 650 mg  650 mg Oral Q4H PRN    acetaminophen (TYLENOL) tablet 975 mg  975 mg Oral Q6H PRN    albuterol (PROVENTIL HFA,VENTOLIN HFA) inhaler 2 puff  2 puff Inhalation Q4H PRN    aluminum-magnesium hydroxide-simethicone (MAALOX) oral suspension 30 mL  30 mL Oral Q4H PRN    benztropine (COGENTIN) tablet 1 mg  1 mg Oral Q4H PRN Max 6/day    bisacodyl (DULCOLAX) rectal suppository 10 mg  10 mg Rectal Daily PRN    budesonide-formoterol (SYMBICORT) 160-4.5 mcg/act inhaler 2 puff  2 puff Inhalation BID    cholecalciferol (VITAMIN D3) tablet 1,000 Units  1,000 Units Oral QAM    cloZAPine " (CLOZARIL) tablet 350 mg  350 mg Oral HS    hydrOXYzine HCL (ATARAX) tablet 50 mg  50 mg Oral Q6H PRN Max 4/day    Or    diphenhydrAMINE (BENADRYL) injection 50 mg  50 mg Intramuscular Q6H PRN    fenofibrate (TRICOR) tablet 145 mg  145 mg Oral Daily    fluticasone (FLONASE) 50 mcg/act nasal spray 1 spray  1 spray Each Nare Daily    folic acid (FOLVITE) tablet 1 mg  1 mg Oral Daily    guaiFENesin (MUCINEX) 12 hr tablet 600 mg  600 mg Oral Q12H KENNY    hydrocortisone (ANUSOL-HC) 2.5 % rectal cream   Topical 4x Daily PRN    hydrOXYzine HCL (ATARAX) tablet 100 mg  100 mg Oral Q6H PRN Max 4/day    Or    LORazepam (ATIVAN) injection 2 mg  2 mg Intramuscular Q6H PRN    hydrOXYzine HCL (ATARAX) tablet 25 mg  25 mg Oral Q6H PRN Max 4/day    lactulose (CHRONULAC) oral solution 20 g  20 g Oral Daily PRN    lamoTRIgine (LaMICtal) tablet 100 mg  100 mg Oral BID    lidocaine (LIDODERM) 5 % patch 1 patch  1 patch Topical Daily    LORazepam (ATIVAN) tablet 1 mg  1 mg Oral QPM    meloxicam (MOBIC) tablet 15 mg  15 mg Oral Daily    methocarbamol (ROBAXIN) tablet 500 mg  500 mg Oral BID    multivitamin-minerals (CENTRUM) tablet 1 tablet  1 tablet Oral Daily    nicotine (NICODERM CQ) 21 mg/24 hr TD 24 hr patch 1 patch  1 patch Transdermal Daily    nicotine polacrilex (NICORETTE) gum 4 mg  4 mg Oral Q2H PRN    pantoprazole (PROTONIX) EC tablet 40 mg  40 mg Oral Daily Before Breakfast    polyethylene glycol (MIRALAX) packet 17 g  17 g Oral Daily PRN    risperiDONE (RisperDAL M-TAB) disintegrating tablet 1 mg  1 mg Oral Q6H PRN Max 3/day    risperiDONE (RisperDAL) tablet 2 mg  2 mg Oral BID    senna-docusate sodium (SENOKOT S) 8.6-50 mg per tablet 1 tablet  1 tablet Oral Daily PRN    senna-docusate sodium (SENOKOT S) 8.6-50 mg per tablet 1 tablet  1 tablet Oral BID    thiamine tablet 100 mg  100 mg Oral Daily    traZODone (DESYREL) tablet 50 mg  50 mg Oral HS PRN    venlafaxine (EFFEXOR-XR) 24 hr capsule 150 mg  150 mg Oral Daily    .    Labs: I have personally reviewed all pertinent laboratory/tests results. CBC:   Lab Results   Component Value Date    WBC 10.66 (H) 03/23/2024    RBC 4.67 03/23/2024    HGB 13.7 03/23/2024    HCT 44.1 03/23/2024    MCV 94 03/23/2024     03/23/2024    MCH 29.3 03/23/2024    MCHC 31.1 (L) 03/23/2024    RDW 15.3 (H) 03/23/2024    MPV 8.9 03/23/2024    NEUTROABS 7.27 03/23/2024     CMP:   Lab Results   Component Value Date    SODIUM 137 03/23/2024    K 4.3 03/23/2024     03/23/2024    CO2 28 03/23/2024    AGAP 8 03/23/2024    BUN 6 03/23/2024    CREATININE 0.56 (L) 03/23/2024    GLUC 88 03/23/2024    GLUF 88 03/23/2024    CALCIUM 8.9 03/23/2024    AST 13 03/23/2024    ALT 10 03/23/2024    ALKPHOS 76 03/23/2024    TP 6.2 (L) 03/23/2024    ALB 3.8 03/23/2024    TBILI 0.18 (L) 03/23/2024    EGFR 107 03/23/2024     Drug Screen:   Lab Results   Component Value Date    AMPMETHUR Negative 03/22/2024    BARBTUR Negative 03/22/2024    BDZUR Negative 03/22/2024    THCUR Negative 03/22/2024    COCAINEUR Negative 03/22/2024    METHADONEUR Negative 03/22/2024    OPIATEUR Negative 03/22/2024    PCPUR Negative 03/22/2024     Medical alcohol level   Lab Results   Component Value Date    ETOH <10 03/22/2024     EKG   Lab Results   Component Value Date    VENTRATE 68 03/23/2024    ATRIALRATE 68 03/23/2024    PRINT 152 03/23/2024    QRSDINT 68 03/23/2024    QTINT 440 03/23/2024    QTCINT 467 03/23/2024    PAXIS 58 03/23/2024    QRSAXIS 3 03/23/2024    TWAVEAXIS 50 03/23/2024       Counseling / Coordination of Care  Total floor / unit time spent today 30 minutes. Greater than 50% of total time was spent with the patient and / or family counseling and / or coordination of care. A description of the counseling / coordination of care: Medication education, treatment plan, safety planning

## 2024-03-27 NOTE — PLAN OF CARE
Problem: Alteration in Thoughts and Perception  Goal: Refrain from acting on delusional thinking/internal stimuli  Description: Interventions:  - Monitor patient closely, per order   - Utilize least restrictive measures   - Set reasonable limits, give positive feedback for acceptable   - Administer medications as ordered and monitor of potential side effects  Outcome: Progressing     Problem: Risk for Self Injury/Neglect  Goal: Refrain from harming self  Description: Interventions:  - Monitor patient closely, per order  - Develop a trusting relationship  - Supervise medication ingestion, monitor effects and side effects   Outcome: Progressing     Problem: Depression  Goal: Verbalize thoughts and feelings  Description: Interventions:  - Assess and re-assess patient's level of risk   - Engage patient in 1:1 interactions, daily, for a minimum of 15 minutes   - Encourage patient to express feelings, fears, frustrations, hopes   Outcome: Progressing  Goal: Refrain from self-neglect  Outcome: Progressing

## 2024-03-27 NOTE — PROGRESS NOTES
03/27/24    Team Meeting   Meeting Type Daily Rounds   Team Members Present   Team Members Present Physician;Nurse;Occupational Therapist;   Physician Team Member Dr Ronald LARSON; Yair COLEMAN   Nursing Team Member Agustin JIMENES   Social Work Team Member Abelardo HOPSON   OT Team Member Gurjit HUITRON   Patient/Family Present   Patient Present No   Patient's Family Present No     BM today, labile, PRN focused, irritable with staff, AH endorsed, demanding, stiff legs endorsed, meds adjusted; no dc date.

## 2024-03-27 NOTE — NURSING NOTE
Patient requested a PRN medication. Administered Cogentin 1 mg PO @ 2044 c/o feeling stiffness in her muscles.

## 2024-03-27 NOTE — NURSING NOTE
Patient visible on the unit, social with peers. Pleasant & cooperative. Patient denies SI/HI, patient continues with AH but utilizing coping skills of walking. Less labile. Q 7 minute checks maintained.

## 2024-03-28 LAB
BASOPHILS # BLD AUTO: 0.06 THOUSANDS/ÂΜL (ref 0–0.1)
BASOPHILS NFR BLD AUTO: 1 % (ref 0–1)
CLOZAPINE SERPL-MCNC: 649 NG/ML (ref 350–900)
EOSINOPHIL # BLD AUTO: 0.39 THOUSAND/ÂΜL (ref 0–0.61)
EOSINOPHIL NFR BLD AUTO: 4 % (ref 0–6)
ERYTHROCYTE [DISTWIDTH] IN BLOOD BY AUTOMATED COUNT: 14.5 % (ref 11.6–15.1)
HCT VFR BLD AUTO: 44.7 % (ref 34.8–46.1)
HGB BLD-MCNC: 14 G/DL (ref 11.5–15.4)
IMM GRANULOCYTES # BLD AUTO: 0.14 THOUSAND/UL (ref 0–0.2)
IMM GRANULOCYTES NFR BLD AUTO: 1 % (ref 0–2)
LYMPHOCYTES # BLD AUTO: 2.17 THOUSANDS/ÂΜL (ref 0.6–4.47)
LYMPHOCYTES NFR BLD AUTO: 21 % (ref 14–44)
MCH RBC QN AUTO: 29.4 PG (ref 26.8–34.3)
MCHC RBC AUTO-ENTMCNC: 31.3 G/DL (ref 31.4–37.4)
MCV RBC AUTO: 94 FL (ref 82–98)
MONOCYTES # BLD AUTO: 0.84 THOUSAND/ÂΜL (ref 0.17–1.22)
MONOCYTES NFR BLD AUTO: 8 % (ref 4–12)
NEUTROPHILS # BLD AUTO: 6.76 THOUSANDS/ÂΜL (ref 1.85–7.62)
NEUTS SEG NFR BLD AUTO: 65 % (ref 43–75)
NRBC BLD AUTO-RTO: 0 /100 WBCS
PLATELET # BLD AUTO: 275 THOUSANDS/UL (ref 149–390)
PMV BLD AUTO: 9.5 FL (ref 8.9–12.7)
RBC # BLD AUTO: 4.77 MILLION/UL (ref 3.81–5.12)
WBC # BLD AUTO: 10.36 THOUSAND/UL (ref 4.31–10.16)

## 2024-03-28 PROCEDURE — 99232 SBSQ HOSP IP/OBS MODERATE 35: CPT | Performed by: NURSE PRACTITIONER

## 2024-03-28 PROCEDURE — 80159 DRUG ASSAY CLOZAPINE: CPT | Performed by: NURSE PRACTITIONER

## 2024-03-28 PROCEDURE — 85025 COMPLETE CBC W/AUTO DIFF WBC: CPT | Performed by: NURSE PRACTITIONER

## 2024-03-28 RX ORDER — RISPERIDONE 2 MG/1
2 TABLET ORAL 2 TIMES DAILY
Status: DISCONTINUED | OUTPATIENT
Start: 2024-03-28 | End: 2024-04-10 | Stop reason: HOSPADM

## 2024-03-28 RX ORDER — BENZONATATE 100 MG/1
100 CAPSULE ORAL 3 TIMES DAILY
Status: DISCONTINUED | OUTPATIENT
Start: 2024-03-28 | End: 2024-04-10 | Stop reason: HOSPADM

## 2024-03-28 RX ADMIN — FLUTICASONE PROPIONATE 1 SPRAY: 50 SPRAY, METERED NASAL at 09:06

## 2024-03-28 RX ADMIN — LORAZEPAM 1 MG: 1 TABLET ORAL at 18:02

## 2024-03-28 RX ADMIN — LACTULOSE 20 G: 20 SOLUTION ORAL at 17:48

## 2024-03-28 RX ADMIN — BENZONATATE 100 MG: 100 CAPSULE ORAL at 13:21

## 2024-03-28 RX ADMIN — HYDROCORTISONE 1 APPLICATION: 25 CREAM TOPICAL at 19:31

## 2024-03-28 RX ADMIN — MELOXICAM 15 MG: 7.5 TABLET ORAL at 08:58

## 2024-03-28 RX ADMIN — VENLAFAXINE HYDROCHLORIDE 150 MG: 150 CAPSULE, EXTENDED RELEASE ORAL at 08:59

## 2024-03-28 RX ADMIN — SENNOSIDES AND DOCUSATE SODIUM 1 TABLET: 8.6; 5 TABLET ORAL at 08:59

## 2024-03-28 RX ADMIN — CLOZAPINE 350 MG: 100 TABLET ORAL at 20:22

## 2024-03-28 RX ADMIN — GUAIFENESIN 600 MG: 600 TABLET ORAL at 08:59

## 2024-03-28 RX ADMIN — SENNOSIDES AND DOCUSATE SODIUM 1 TABLET: 8.6; 5 TABLET ORAL at 18:02

## 2024-03-28 RX ADMIN — RISPERIDONE 2 MG: 2 TABLET ORAL at 09:01

## 2024-03-28 RX ADMIN — LAMOTRIGINE 100 MG: 100 TABLET ORAL at 18:02

## 2024-03-28 RX ADMIN — NICOTINE 1 PATCH: 21 PATCH, EXTENDED RELEASE TRANSDERMAL at 09:00

## 2024-03-28 RX ADMIN — GUAIFENESIN 600 MG: 600 TABLET ORAL at 20:23

## 2024-03-28 RX ADMIN — PANTOPRAZOLE SODIUM 40 MG: 40 TABLET, DELAYED RELEASE ORAL at 06:24

## 2024-03-28 RX ADMIN — RISPERIDONE 2 MG: 2 TABLET ORAL at 15:01

## 2024-03-28 RX ADMIN — CHOLECALCIFEROL TAB 25 MCG (1000 UNIT) 1000 UNITS: 25 TAB at 08:58

## 2024-03-28 RX ADMIN — THIAMINE HCL TAB 100 MG 100 MG: 100 TAB at 08:59

## 2024-03-28 RX ADMIN — FOLIC ACID 1 MG: 1 TABLET ORAL at 08:59

## 2024-03-28 RX ADMIN — LAMOTRIGINE 100 MG: 100 TABLET ORAL at 08:59

## 2024-03-28 RX ADMIN — BENZONATATE 100 MG: 100 CAPSULE ORAL at 15:01

## 2024-03-28 RX ADMIN — BENZONATATE 100 MG: 100 CAPSULE ORAL at 20:23

## 2024-03-28 RX ADMIN — METHOCARBAMOL TABLETS 500 MG: 500 TABLET, COATED ORAL at 18:02

## 2024-03-28 RX ADMIN — METHOCARBAMOL TABLETS 500 MG: 500 TABLET, COATED ORAL at 08:59

## 2024-03-28 RX ADMIN — FENOFIBRATE 145 MG: 145 TABLET, FILM COATED ORAL at 08:59

## 2024-03-28 RX ADMIN — LIDOCAINE 5% 1 PATCH: 700 PATCH TOPICAL at 09:00

## 2024-03-28 RX ADMIN — BUDESONIDE AND FORMOTEROL FUMARATE DIHYDRATE 2 PUFF: 160; 4.5 AEROSOL RESPIRATORY (INHALATION) at 09:06

## 2024-03-28 RX ADMIN — Medication 1 TABLET: at 08:58

## 2024-03-28 NOTE — PLAN OF CARE
Problem: Risk for Self Injury/Neglect  Goal: Refrain from harming self  Description: Interventions:  - Monitor patient closely, per order  - Develop a trusting relationship  - Supervise medication ingestion, monitor effects and side effects   Outcome: Progressing     Problem: Alteration in Thoughts and Perception  Goal: Complete daily ADLs, including personal hygiene independently, as able  Description: Interventions:  - Observe, teach, and assist patient with ADLS  - Monitor and promote a balance of rest/activity, with adequate nutrition and elimination   Outcome: Progressing     Problem: Alteration in Thoughts and Perception  Goal: Recognize dysfunctional thoughts, communicate reality-based thoughts at the time of discharge  Description: Interventions:  - Provide medication and psycho-education to assist patient in compliance and developing insight into his/her illness   Outcome: Progressing

## 2024-03-28 NOTE — NURSING NOTE
Patient was visible in the unit, attended group with full participation, she relates with staff and peers appropriately. She endorsed mild anxiety, AVH but denies SI HI. She took her medication and food, seeking for more medication and attention. Staff was able to process with her and reassured her to use her coping skill. Q-7 minutes was maintained and continue.

## 2024-03-28 NOTE — PROGRESS NOTES
"Progress Note - Behavioral Health   Jo-Ann Lamb 53 y.o. female MRN: [id number]  Unit/Bed#: [facility] 256-01 Encounter: [id number]    Assessment/Plan   Principal Problem:    Schizoaffective disorder, bipolar type (HCC)      Behavior over the last 24 hours: Slowly improving  Sleep: normal  Appetite: normal  Medication side effects: No  ROS: no complaints and all other systems are negative    Jo-Ann remains visible and participatory milieu activities.  Mood less labile.  Somewhat preservative on discharge, but responds well to education regarding treatment plan and medications.  Continues to endorse ongoing AVH that \"come and go\" and described as noncommanding and sounding like an intercom system.  Mildly paranoid.  Appeared preoccupied throughout encounter.  Describes staff and independent living and her boyfriend is good supports.  Has been maintaining adequate hygiene, appetite, and medication compliance.    Mental Status Evaluation:  Appearance:  age appropriate and wearing pajamas, long blonde hair and ponytail   Behavior:  Cooperative, calm, mildly guarded   Speech:  delayed   Mood:  \"Okay\"   Affect:  constricted, mildly anxious   Thought Process:  circumstantial   Associations: circumstantial associations   Thought Content:  Paranoid   Perceptual Disturbances: Reports AVH that \"come and go\" and \"sound like an intercom system,\" noncommanding  Appears internally preoccupied   Risk Potential: Suicidal Ideations none  Homicidal Ideations none  Potential for Aggression No   Sensorium:  person, place, time/date, and situation   Memory:  recent and remote memory grossly intact   Consciousness:  alert and awake    Attention: attention span appeared shorter than expected for age   Insight:  limited   Judgment: limited   Gait/Station: normal gait/station and normal balance   Motor Activity: no abnormal movements     Progress Toward Goals: AVH and some paranoia persists; appears preoccupied.  Mood less labile.  Clozaril level " pending.  Will continue with current psychotropic regimen with plan to titrate Clozaril accordingly for treatment of ongoing psychosis.  Utilizing 2 antipsychotics currently as augmentation to Clozaril and multiple failed monotherapy trials.  No discharge date at this time.    Recommended Treatment: Continue with group therapy, milieu therapy and occupational therapy.      Risks, benefits and possible side effects of Medications:   Jo-Ann has limited understanding of risk versus benefits of medications, but agrees to take as prescribed.    Medications: all current active meds have been reviewed, continue current psychiatric medications, and current meds:   Current Facility-Administered Medications   Medication Dose Route Frequency    acetaminophen (TYLENOL) tablet 650 mg  650 mg Oral Q6H PRN    acetaminophen (TYLENOL) tablet 650 mg  650 mg Oral Q4H PRN    acetaminophen (TYLENOL) tablet 975 mg  975 mg Oral Q6H PRN    albuterol (PROVENTIL HFA,VENTOLIN HFA) inhaler 2 puff  2 puff Inhalation Q4H PRN    aluminum-magnesium hydroxide-simethicone (MAALOX) oral suspension 30 mL  30 mL Oral Q4H PRN    benztropine (COGENTIN) tablet 1 mg  1 mg Oral Q4H PRN Max 6/day    bisacodyl (DULCOLAX) rectal suppository 10 mg  10 mg Rectal Daily PRN    budesonide-formoterol (SYMBICORT) 160-4.5 mcg/act inhaler 2 puff  2 puff Inhalation BID    cholecalciferol (VITAMIN D3) tablet 1,000 Units  1,000 Units Oral QAM    cloZAPine (CLOZARIL) tablet 350 mg  350 mg Oral HS    hydrOXYzine HCL (ATARAX) tablet 50 mg  50 mg Oral Q6H PRN Max 4/day    Or    diphenhydrAMINE (BENADRYL) injection 50 mg  50 mg Intramuscular Q6H PRN    fenofibrate (TRICOR) tablet 145 mg  145 mg Oral Daily    fluticasone (FLONASE) 50 mcg/act nasal spray 1 spray  1 spray Each Nare Daily    folic acid (FOLVITE) tablet 1 mg  1 mg Oral Daily    guaiFENesin (MUCINEX) 12 hr tablet 600 mg  600 mg Oral Q12H KENNY    hydrocortisone (ANUSOL-HC) 2.5 % rectal cream   Topical 4x Daily PRN     hydrOXYzine HCL (ATARAX) tablet 100 mg  100 mg Oral Q6H PRN Max 4/day    Or    LORazepam (ATIVAN) injection 2 mg  2 mg Intramuscular Q6H PRN    hydrOXYzine HCL (ATARAX) tablet 25 mg  25 mg Oral Q6H PRN Max 4/day    lactulose (CHRONULAC) oral solution 20 g  20 g Oral Daily PRN    lamoTRIgine (LaMICtal) tablet 100 mg  100 mg Oral BID    lidocaine (LIDODERM) 5 % patch 1 patch  1 patch Topical Daily    LORazepam (ATIVAN) tablet 1 mg  1 mg Oral QPM    meloxicam (MOBIC) tablet 15 mg  15 mg Oral Daily    methocarbamol (ROBAXIN) tablet 500 mg  500 mg Oral BID    multivitamin-minerals (CENTRUM) tablet 1 tablet  1 tablet Oral Daily    nicotine (NICODERM CQ) 21 mg/24 hr TD 24 hr patch 1 patch  1 patch Transdermal Daily    nicotine polacrilex (NICORETTE) gum 4 mg  4 mg Oral Q2H PRN    pantoprazole (PROTONIX) EC tablet 40 mg  40 mg Oral Daily Before Breakfast    polyethylene glycol (MIRALAX) packet 17 g  17 g Oral Daily PRN    risperiDONE (RisperDAL M-TAB) disintegrating tablet 1 mg  1 mg Oral Q6H PRN Max 3/day    risperiDONE (RisperDAL) tablet 2 mg  2 mg Oral BID    senna-docusate sodium (SENOKOT S) 8.6-50 mg per tablet 1 tablet  1 tablet Oral Daily PRN    senna-docusate sodium (SENOKOT S) 8.6-50 mg per tablet 1 tablet  1 tablet Oral BID    thiamine tablet 100 mg  100 mg Oral Daily    traZODone (DESYREL) tablet 50 mg  50 mg Oral HS PRN    venlafaxine (EFFEXOR-XR) 24 hr capsule 150 mg  150 mg Oral Daily   .    Labs: I have personally reviewed all pertinent laboratory/tests results. CBC:   Lab Results   Component Value Date    WBC 10.36 (H) 03/28/2024    RBC 4.77 03/28/2024    HGB 14.0 03/28/2024    HCT 44.7 03/28/2024    MCV 94 03/28/2024     03/28/2024    MCH 29.4 03/28/2024    MCHC 31.3 (L) 03/28/2024    RDW 14.5 03/28/2024    MPV 9.5 03/28/2024    NEUTROABS 6.76 03/28/2024     CMP:   Lab Results   Component Value Date    SODIUM 137 03/23/2024    K 4.3 03/23/2024     03/23/2024    CO2 28 03/23/2024    AGAP 8  03/23/2024    BUN 6 03/23/2024    CREATININE 0.56 (L) 03/23/2024    GLUC 88 03/23/2024    GLUF 88 03/23/2024    CALCIUM 8.9 03/23/2024    AST 13 03/23/2024    ALT 10 03/23/2024    ALKPHOS 76 03/23/2024    TP 6.2 (L) 03/23/2024    ALB 3.8 03/23/2024    TBILI 0.18 (L) 03/23/2024    EGFR 107 03/23/2024     Drug Screen:   Lab Results   Component Value Date    AMPMETHUR Negative 03/22/2024    BARBTUR Negative 03/22/2024    BDZUR Negative 03/22/2024    THCUR Negative 03/22/2024    COCAINEUR Negative 03/22/2024    METHADONEUR Negative 03/22/2024    OPIATEUR Negative 03/22/2024    PCPUR Negative 03/22/2024     EKG   Lab Results   Component Value Date    VENTRATE 68 03/23/2024    ATRIALRATE 68 03/23/2024    PRINT 152 03/23/2024    QRSDINT 68 03/23/2024    QTINT 440 03/23/2024    QTCINT 467 03/23/2024    PAXIS 58 03/23/2024    QRSAXIS 3 03/23/2024    TWAVEAXIS 50 03/23/2024       Counseling / Coordination of Care  Total floor / unit time spent today 30 minutes. Greater than 50% of total time was spent with the patient and / or family counseling and / or coordination of care. A description of the counseling / coordination of care: Medication education, treatment plan, safety planning

## 2024-03-28 NOTE — NURSING NOTE
Patient was isolative, spent most of the shift withdrawn to her room with minimal peer interactions. Patient was calm, cooperative and compliant with medications and meals. Patient denies SI/HI, but does state to have  soft auditory hallucinations. Staff maintained,Q 7 safety checks, administered medications as prescribed and assisted as needed. We will continue to monitor, support and encourage.

## 2024-03-28 NOTE — PROGRESS NOTES
Progress Note - Jo-Ann Lamb 53 y.o. female MRN: [id number]    Unit/Bed#: [facility] 256-01 Encounter: [id number]        Subjective:   Patient seen and examined at bedside after reviewing the chart and discussing the case with the caring staff.      Patient examined at bedside.  Patient continues to complain of cough. She states she was just treated for this with antibiotic. Per chart review patient requests antibiotic frequently for this same complaint. Patient denies HA, sinus pressure, congestion, runny nose, SOB, chest pain, fever/chills, N/V, or diarrhea.     Physical Exam   Vitals: Blood pressure 119/81, pulse 98, temperature 97.6 °F (36.4 °C), temperature source Temporal, resp. rate 18, height 5' (1.524 m), weight 64.4 kg (142 lb), last menstrual period 01/01/2020, SpO2 95%, not currently breastfeeding.,Body mass index is 27.73 kg/m².  Constitutional: Patient in no acute distress.  HEENT: PERR, EOMI, MMM.  Cardiovascular: Normal rate and regular rhythm.    Pulmonary/Chest: Effort normal and breath sounds normal.   Abdomen: Soft, + BS, NT.  R great toe, callus: Significant layer of extra skin.     Assessment/Plan:  Jo-Ann Lamb is a(n) 53 y.o. year old female with Schizoaffective DO.      Hx of asthma. Patient on Symbicort inhaler twice daily and albuterol as needed.   Hypertriglyceridemia. Patient on Fenofibrate 145 mg daily. Lipid panel 3/23/24: cholesterol 221, triglycerides 211, . Consider Statin. F/up with PCP outpt.   Allergic rhinitis. Patient on Flonase daily.   GERD. Patient on Protonix 40 mg daily.   Tobacco abuse. NRT.   Alcohol abuse. Start patient on thiamine 100 mg, folic acid 1 mg, and multivitamin daily.   Chronic back pain. Patient on Mobic 15 mg daily, Robaxin 500 mg twice daily, and lidocaine patch daily.   Vitamin D deficiency. Continue daily supplement.   Cough/ congestion. Mucinex twice daily. Add tessalon pearls as needed. Consider z pack if symptoms worsen or continue.   Callus of R  great toe. Encouraged pt to f/u with podiatry outpatient.   Hemorrhoids/constipation. Apply Anusol daily. Lactulose 20 mg as needed (pt requesting sorbitol).    The patient was discussed with Dr. Steel and he is in agreement with the above note.

## 2024-03-28 NOTE — PROGRESS NOTES
03/28/24   Team Meeting   Meeting Type Daily Rounds   Team Members Present   Team Members Present Physician;Nurse;Occupational Therapist;   Physician Team Member Dr Ronald LARSON; Yair COLEMAN   Nursing Team Member Nguyen JIMENES   Social Work Team Member Abelardo HOPSON   OT Team Member Gurjit HUITRON   Patient/Family Present              Jamila Blankenship   Patient Present No   Patient's Family Present No     Reports feeling slightly better, AH improving but still present, slept, irritable, maintaining safety, responding to internal stimuli; no dc date.

## 2024-03-29 PROCEDURE — 99232 SBSQ HOSP IP/OBS MODERATE 35: CPT | Performed by: HOSPITALIST

## 2024-03-29 RX ORDER — CLOZAPINE 100 MG/1
400 TABLET ORAL
Status: DISCONTINUED | OUTPATIENT
Start: 2024-03-29 | End: 2024-04-05

## 2024-03-29 RX ADMIN — HYDROCORTISONE: 25 CREAM TOPICAL at 20:49

## 2024-03-29 RX ADMIN — METHOCARBAMOL TABLETS 500 MG: 500 TABLET, COATED ORAL at 17:36

## 2024-03-29 RX ADMIN — FOLIC ACID 1 MG: 1 TABLET ORAL at 09:02

## 2024-03-29 RX ADMIN — METHOCARBAMOL TABLETS 500 MG: 500 TABLET, COATED ORAL at 09:01

## 2024-03-29 RX ADMIN — LAMOTRIGINE 100 MG: 100 TABLET ORAL at 17:36

## 2024-03-29 RX ADMIN — RISPERIDONE 2 MG: 2 TABLET ORAL at 09:02

## 2024-03-29 RX ADMIN — LAMOTRIGINE 100 MG: 100 TABLET ORAL at 09:02

## 2024-03-29 RX ADMIN — VENLAFAXINE HYDROCHLORIDE 150 MG: 150 CAPSULE, EXTENDED RELEASE ORAL at 09:02

## 2024-03-29 RX ADMIN — BENZONATATE 100 MG: 100 CAPSULE ORAL at 20:49

## 2024-03-29 RX ADMIN — RISPERIDONE 2 MG: 2 TABLET ORAL at 15:12

## 2024-03-29 RX ADMIN — BENZONATATE 100 MG: 100 CAPSULE ORAL at 15:13

## 2024-03-29 RX ADMIN — SENNOSIDES AND DOCUSATE SODIUM 1 TABLET: 8.6; 5 TABLET ORAL at 09:02

## 2024-03-29 RX ADMIN — CHOLECALCIFEROL TAB 25 MCG (1000 UNIT) 1000 UNITS: 25 TAB at 09:02

## 2024-03-29 RX ADMIN — LORAZEPAM 1 MG: 1 TABLET ORAL at 17:36

## 2024-03-29 RX ADMIN — FENOFIBRATE 145 MG: 145 TABLET, FILM COATED ORAL at 09:02

## 2024-03-29 RX ADMIN — BUDESONIDE AND FORMOTEROL FUMARATE DIHYDRATE 2 PUFF: 160; 4.5 AEROSOL RESPIRATORY (INHALATION) at 17:39

## 2024-03-29 RX ADMIN — BUDESONIDE AND FORMOTEROL FUMARATE DIHYDRATE 2 PUFF: 160; 4.5 AEROSOL RESPIRATORY (INHALATION) at 09:03

## 2024-03-29 RX ADMIN — CLOZAPINE 400 MG: 100 TABLET ORAL at 20:49

## 2024-03-29 RX ADMIN — LIDOCAINE 5% 1 PATCH: 700 PATCH TOPICAL at 09:04

## 2024-03-29 RX ADMIN — LACTULOSE 20 G: 20 SOLUTION ORAL at 18:13

## 2024-03-29 RX ADMIN — PANTOPRAZOLE SODIUM 40 MG: 40 TABLET, DELAYED RELEASE ORAL at 09:05

## 2024-03-29 RX ADMIN — THIAMINE HCL TAB 100 MG 100 MG: 100 TAB at 09:02

## 2024-03-29 RX ADMIN — NICOTINE 1 PATCH: 21 PATCH, EXTENDED RELEASE TRANSDERMAL at 09:06

## 2024-03-29 RX ADMIN — MELOXICAM 15 MG: 7.5 TABLET ORAL at 09:03

## 2024-03-29 RX ADMIN — FLUTICASONE PROPIONATE 1 SPRAY: 50 SPRAY, METERED NASAL at 09:03

## 2024-03-29 RX ADMIN — BENZONATATE 100 MG: 100 CAPSULE ORAL at 09:02

## 2024-03-29 RX ADMIN — Medication 1 TABLET: at 09:02

## 2024-03-29 RX ADMIN — GUAIFENESIN 600 MG: 600 TABLET ORAL at 20:49

## 2024-03-29 RX ADMIN — GUAIFENESIN 600 MG: 600 TABLET ORAL at 09:02

## 2024-03-29 RX ADMIN — SENNOSIDES AND DOCUSATE SODIUM 1 TABLET: 8.6; 5 TABLET ORAL at 17:36

## 2024-03-29 NOTE — PROGRESS NOTES
03/29/24   Team Meeting   Meeting Type Daily Rounds   Team Members Present   Team Members Present Physician;Nurse;;Occupational Therapist   Physician Team Member Dr Ronald LARSON; Yair COLEMAN   Nursing Team Member Mirella JIMENES   Social Work Team Member Abelardo HOPSON   OT Team Member Gurjit HUITRON   Patient/Family Present   Patient Present No   Patient's Family Present No     Paranoid delusions, chronic AH less intense, no command AH, feels safe, no SI/HI, cough tx by medical, low frustration tolerance, labile mood, Clozaril 649; no dc date.
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
## 2024-03-29 NOTE — PROGRESS NOTES
"Progress Note - Behavioral Health   Jo-Ann Lamb 53 y.o. female MRN: [id number]  Unit/Bed#: [facility] 254-01 Encounter: [id number]    Assessment/Plan   Principal Problem:    Schizoaffective disorder, bipolar type (HCC)      Behavior over the last 24 hours:  unchanged  Sleep: normal  Appetite: normal  Medication side effects: No  ROS: no complaints and all other systems are negative    Jo-Ann continues to endorse intermittent AVH of what she describes as \"an intercom system.\"  Believes there are also \"two thieves\" who follow her and try to hypnotize her.  Partially reality tests stating \"I know they're not real, but I hear them and often look for them while I'm home.  They hide.\"  Mood controlled and less irritable.  Has been tending to ADLs independently and is compliant with medications and meals.  Denies sleep disturbance.  Agreeable to further titration of clozapine for ongoing psychosis.  Receptive to medication education regarding diagnosis and treatment plan.  Also identifies ACT as strong support.    Mental Status Evaluation:  Appearance:  age appropriate and blonde hair, wearing pajamas   Behavior:  Cooperative, calm , appears distracted   Speech:  normal pitch and normal volume   Mood:  \"Better today\"   Affect:  constricted, mood-congruent, and redirectable   Thought Process:  circumstantial and illogical   Associations: circumstantial associations   Thought Content:  Bizarre and paranoid delusions   Perceptual Disturbances: AVH of \"intercom system \" , noncommanding, rates loudness 2/10  Appeared internally preoccupied   Risk Potential: Suicidal Ideations none  Homicidal Ideations none  Potential for Aggression No   Sensorium:  person, place, time/date, and situation   Memory:  recent and remote memory grossly intact   Consciousness:  alert and awake    Attention: attention span appeared shorter than expected for age   Insight:  limited   Judgment: limited   Gait/Station: normal gait/station and normal balance "   Motor Activity: no abnormal movements     Progress Toward Goals: Paranoia and internal preoccupation persists.  Mood less labile.  Clozaril level 649 on 350 mg/daily dosing.  Plan is to increase Clozaril 400 mg PO BID for ongoing psychosis.  Utilizing 2 antipsychotics as augmentation to Clozaril and history of multiple failed monotherapy trials.  No discharge date at this time.    Recommended Treatment: Continue with group therapy, milieu therapy and occupational therapy.      Risks, benefits and possible side effects of Medications:   Jo-Ann has limited understanding of risk versus benefits of medications, but agrees to take as prescribed.    Medications:   Increase Clozaril 400 mg PO QHS  all current active meds have been reviewed and current meds:   Current Facility-Administered Medications   Medication Dose Route Frequency    acetaminophen (TYLENOL) tablet 650 mg  650 mg Oral Q6H PRN    acetaminophen (TYLENOL) tablet 650 mg  650 mg Oral Q4H PRN    acetaminophen (TYLENOL) tablet 975 mg  975 mg Oral Q6H PRN    albuterol (PROVENTIL HFA,VENTOLIN HFA) inhaler 2 puff  2 puff Inhalation Q4H PRN    aluminum-magnesium hydroxide-simethicone (MAALOX) oral suspension 30 mL  30 mL Oral Q4H PRN    benzonatate (TESSALON PERLES) capsule 100 mg  100 mg Oral TID    benztropine (COGENTIN) tablet 1 mg  1 mg Oral Q4H PRN Max 6/day    bisacodyl (DULCOLAX) rectal suppository 10 mg  10 mg Rectal Daily PRN    budesonide-formoterol (SYMBICORT) 160-4.5 mcg/act inhaler 2 puff  2 puff Inhalation BID    Cholecalciferol (VITAMIN D3) tablet 1,000 Units  1,000 Units Oral QAM    cloZAPine (CLOZARIL) tablet 400 mg  400 mg Oral HS    hydrOXYzine HCL (ATARAX) tablet 50 mg  50 mg Oral Q6H PRN Max 4/day    Or    diphenhydrAMINE (BENADRYL) injection 50 mg  50 mg Intramuscular Q6H PRN    fenofibrate (TRICOR) tablet 145 mg  145 mg Oral Daily    fluticasone (FLONASE) 50 mcg/act nasal spray 1 spray  1 spray Each Nare Daily    folic acid (FOLVITE) tablet 1  mg  1 mg Oral Daily    guaiFENesin (MUCINEX) 12 hr tablet 600 mg  600 mg Oral Q12H KENNY    hydrocortisone (ANUSOL-HC) 2.5 % rectal cream   Topical 4x Daily PRN    hydrOXYzine HCL (ATARAX) tablet 100 mg  100 mg Oral Q6H PRN Max 4/day    Or    LORazepam (ATIVAN) injection 2 mg  2 mg Intramuscular Q6H PRN    hydrOXYzine HCL (ATARAX) tablet 25 mg  25 mg Oral Q6H PRN Max 4/day    lactulose (CHRONULAC) oral solution 20 g  20 g Oral Daily PRN    lamoTRIgine (LaMICtal) tablet 100 mg  100 mg Oral BID    lidocaine (LIDODERM) 5 % patch 1 patch  1 patch Topical Daily    LORazepam (ATIVAN) tablet 1 mg  1 mg Oral QPM    meloxicam (MOBIC) tablet 15 mg  15 mg Oral Daily    methocarbamol (ROBAXIN) tablet 500 mg  500 mg Oral BID    multivitamin-minerals (CENTRUM) tablet 1 tablet  1 tablet Oral Daily    nicotine (NICODERM CQ) 21 mg/24 hr TD 24 hr patch 1 patch  1 patch Transdermal Daily    nicotine polacrilex (NICORETTE) gum 4 mg  4 mg Oral Q2H PRN    pantoprazole (PROTONIX) EC tablet 40 mg  40 mg Oral Daily Before Breakfast    polyethylene glycol (MIRALAX) packet 17 g  17 g Oral Daily PRN    risperiDONE (RisperDAL M-TAB) disintegrating tablet 1 mg  1 mg Oral Q6H PRN Max 3/day    risperiDONE (RisperDAL) tablet 2 mg  2 mg Oral BID    senna-docusate sodium (SENOKOT S) 8.6-50 mg per tablet 1 tablet  1 tablet Oral Daily PRN    senna-docusate sodium (SENOKOT S) 8.6-50 mg per tablet 1 tablet  1 tablet Oral BID    thiamine tablet 100 mg  100 mg Oral Daily    traZODone (DESYREL) tablet 50 mg  50 mg Oral HS PRN    venlafaxine (EFFEXOR-XR) 24 hr capsule 150 mg  150 mg Oral Daily   .    Labs: I have personally reviewed all pertinent laboratory/tests results. CBC:   Lab Results   Component Value Date    WBC 10.36 (H) 03/28/2024    RBC 4.77 03/28/2024    HGB 14.0 03/28/2024    HCT 44.7 03/28/2024    MCV 94 03/28/2024     03/28/2024    MCH 29.4 03/28/2024    MCHC 31.3 (L) 03/28/2024    RDW 14.5 03/28/2024    MPV 9.5 03/28/2024    NEUTROABS  6.76 03/28/2024     CMP:   Lab Results   Component Value Date    SODIUM 137 03/23/2024    K 4.3 03/23/2024     03/23/2024    CO2 28 03/23/2024    AGAP 8 03/23/2024    BUN 6 03/23/2024    CREATININE 0.56 (L) 03/23/2024    GLUC 88 03/23/2024    GLUF 88 03/23/2024    CALCIUM 8.9 03/23/2024    AST 13 03/23/2024    ALT 10 03/23/2024    ALKPHOS 76 03/23/2024    TP 6.2 (L) 03/23/2024    ALB 3.8 03/23/2024    TBILI 0.18 (L) 03/23/2024    EGFR 107 03/23/2024     Clozapine:   Lab Results   Component Value Date    CLOZAPINE 649 03/28/2024    NCLOZIP 241 11/29/2023     EKG   Lab Results   Component Value Date    VENTRATE 68 03/23/2024    ATRIALRATE 68 03/23/2024    PRINT 152 03/23/2024    QRSDINT 68 03/23/2024    QTINT 440 03/23/2024    QTCINT 467 03/23/2024    PAXIS 58 03/23/2024    QRSAXIS 3 03/23/2024    TWAVEAXIS 50 03/23/2024       Counseling / Coordination of Care  Total floor / unit time spent today 30 minutes. Greater than 50% of total time was spent with the patient and / or family counseling and / or coordination of care. A description of the counseling / coordination of care: Medication education, treatment plan, safety planning

## 2024-03-29 NOTE — PROGRESS NOTES
Progress Note - Jo-Ann Lamb 53 y.o. female MRN: [id number]    Unit/Bed#: [facility] 254-01 Encounter: [id number]        Subjective:   Patient seen and examined at bedside after reviewing the chart and discussing the case with the caring staff.      Patient examined at bedside.  Patient has no acute symptoms. She stated she keeps getting hot flashes and is wondering if that is infection in her body. Patient has no fever or chills, no SOB.     Physical Exam   Vitals: Blood pressure 115/79, pulse 83, temperature 98.2 °F (36.8 °C), temperature source Temporal, resp. rate 18, height 5' (1.524 m), weight 64.4 kg (142 lb), last menstrual period 01/01/2020, SpO2 92%, not currently breastfeeding.,Body mass index is 27.73 kg/m².  Constitutional: Patient in no acute distress.  HEENT: PERR, EOMI, MMM.  Cardiovascular: Normal rate and regular rhythm.    Pulmonary/Chest: Effort normal and breath sounds normal.   Abdomen: Soft, + BS, NT.  R great toe, callus: Significant layer of extra skin.     Assessment/Plan:  Jo-Ann Lamb is a(n) 53 y.o. year old female with Schizoaffective DO.      Hx of asthma. Patient on Symbicort inhaler twice daily and albuterol as needed.   Hypertriglyceridemia. Patient on Fenofibrate 145 mg daily. Lipid panel 3/23/24: cholesterol 221, triglycerides 211, . Consider Statin. F/up with PCP outpt.   Allergic rhinitis. Patient on Flonase daily.   GERD. Patient on Protonix 40 mg daily.   Tobacco abuse. NRT.   Alcohol abuse. Start patient on thiamine 100 mg, folic acid 1 mg, and multivitamin daily.   Chronic back pain. Patient on Mobic 15 mg daily, Robaxin 500 mg twice daily, and lidocaine patch daily.   Vitamin D deficiency. Continue daily supplement.   Cough/ congestion. Mucinex twice daily. Add tessalon pearls as needed. Consider z pack if symptoms worsen or continue.   Callus of R great toe. Encouraged pt to f/u with podiatry outpatient.   Hemorrhoids/constipation. Apply Anusol daily. Lactulose 20 mg as  needed (pt requesting sorbitol).    The patient was discussed with Dr. Steel and he is in agreement with the above note.

## 2024-03-29 NOTE — NURSING NOTE
Patient visible on milieu.Pleasant and cooperative.less pressured. Less irritable.  Intermitted visual and auditory hallucination.Appears internally preoccupied and paranoia. Well groom.Schedule Po medication as ordered. Denies SI , HI. Continue on safety check.

## 2024-03-29 NOTE — NURSING NOTE
Patient visible at times mostly with drawn to room. Pleasant and cooperative. Medication compliant. Denies SI,HI. Patient does endorse AH. Safety checks continue Q 7 minutes.

## 2024-03-29 NOTE — PLAN OF CARE
Problem: Alteration in Thoughts and Perception  Goal: Refrain from acting on delusional thinking/internal stimuli  Description: Interventions:  - Monitor patient closely, per order   - Utilize least restrictive measures   - Set reasonable limits, give positive feedback for acceptable   - Administer medications as ordered and monitor of potential side effects  Outcome: Progressing     Problem: Risk for Self Injury/Neglect  Goal: Refrain from harming self  Description: Interventions:  - Monitor patient closely, per order  - Develop a trusting relationship  - Supervise medication ingestion, monitor effects and side effects   Outcome: Progressing  Goal: Recognize maladaptive responses and adopt new coping mechanisms  Outcome: Progressing     Problem: Depression  Goal: Verbalize thoughts and feelings  Description: Interventions:  - Assess and re-assess patient's level of risk   - Engage patient in 1:1 interactions, daily, for a minimum of 15 minutes   - Encourage patient to express feelings, fears, frustrations, hopes   Outcome: Progressing     Problem: Anxiety  Goal: Anxiety is at manageable level  Description: Interventions:  - Assess and monitor patient's anxiety level.   - Monitor for signs and symptoms (heart palpitations, chest pain, shortness of breath, headaches, nausea, feeling jumpy, restlessness, irritable, apprehensive).   - Collaborate with interdisciplinary team and initiate plan and interventions as ordered.  - [place] patient to unit/surroundings  - Explain treatment plan  - Encourage participation in care  - Encourage verbalization of concerns/fears  - Identify coping mechanisms  - Assist in developing anxiety-reducing skills  - Administer/offer alternative therapies  - Limit or eliminate stimulants  Outcome: Progressing

## 2024-03-30 PROCEDURE — 99232 SBSQ HOSP IP/OBS MODERATE 35: CPT | Performed by: HOSPITALIST

## 2024-03-30 RX ORDER — FLUCONAZOLE 150 MG/1
150 TABLET ORAL ONCE
Status: COMPLETED | OUTPATIENT
Start: 2024-03-30 | End: 2024-03-30

## 2024-03-30 RX ORDER — GUAIFENESIN 600 MG/1
1200 TABLET, EXTENDED RELEASE ORAL EVERY 12 HOURS SCHEDULED
Status: DISCONTINUED | OUTPATIENT
Start: 2024-03-30 | End: 2024-04-10 | Stop reason: HOSPADM

## 2024-03-30 RX ADMIN — BENZONATATE 100 MG: 100 CAPSULE ORAL at 15:25

## 2024-03-30 RX ADMIN — Medication 1 TABLET: at 08:42

## 2024-03-30 RX ADMIN — SENNOSIDES AND DOCUSATE SODIUM 1 TABLET: 8.6; 5 TABLET ORAL at 08:39

## 2024-03-30 RX ADMIN — MELOXICAM 15 MG: 7.5 TABLET ORAL at 08:38

## 2024-03-30 RX ADMIN — SENNOSIDES AND DOCUSATE SODIUM 1 TABLET: 8.6; 5 TABLET ORAL at 18:02

## 2024-03-30 RX ADMIN — LORAZEPAM 1 MG: 1 TABLET ORAL at 18:02

## 2024-03-30 RX ADMIN — LIDOCAINE 5% 1 PATCH: 700 PATCH TOPICAL at 08:42

## 2024-03-30 RX ADMIN — HYDROCORTISONE: 25 CREAM TOPICAL at 08:45

## 2024-03-30 RX ADMIN — HYDROCORTISONE: 25 CREAM TOPICAL at 20:59

## 2024-03-30 RX ADMIN — CLOZAPINE 400 MG: 100 TABLET ORAL at 20:59

## 2024-03-30 RX ADMIN — PANTOPRAZOLE SODIUM 40 MG: 40 TABLET, DELAYED RELEASE ORAL at 08:39

## 2024-03-30 RX ADMIN — LAMOTRIGINE 100 MG: 100 TABLET ORAL at 18:02

## 2024-03-30 RX ADMIN — GUAIFENESIN 1200 MG: 600 TABLET ORAL at 20:59

## 2024-03-30 RX ADMIN — NICOTINE 1 PATCH: 21 PATCH, EXTENDED RELEASE TRANSDERMAL at 08:44

## 2024-03-30 RX ADMIN — METHOCARBAMOL TABLETS 500 MG: 500 TABLET, COATED ORAL at 08:40

## 2024-03-30 RX ADMIN — FENOFIBRATE 145 MG: 145 TABLET, FILM COATED ORAL at 08:39

## 2024-03-30 RX ADMIN — BUDESONIDE AND FORMOTEROL FUMARATE DIHYDRATE 2 PUFF: 160; 4.5 AEROSOL RESPIRATORY (INHALATION) at 08:47

## 2024-03-30 RX ADMIN — RISPERIDONE 2 MG: 2 TABLET ORAL at 08:39

## 2024-03-30 RX ADMIN — LAMOTRIGINE 100 MG: 100 TABLET ORAL at 08:38

## 2024-03-30 RX ADMIN — FOLIC ACID 1 MG: 1 TABLET ORAL at 08:39

## 2024-03-30 RX ADMIN — RISPERIDONE 2 MG: 2 TABLET ORAL at 15:23

## 2024-03-30 RX ADMIN — LACTULOSE 20 G: 20 SOLUTION ORAL at 18:29

## 2024-03-30 RX ADMIN — GUAIFENESIN 600 MG: 600 TABLET ORAL at 08:38

## 2024-03-30 RX ADMIN — VENLAFAXINE HYDROCHLORIDE 150 MG: 150 CAPSULE, EXTENDED RELEASE ORAL at 08:39

## 2024-03-30 RX ADMIN — BENZONATATE 100 MG: 100 CAPSULE ORAL at 08:40

## 2024-03-30 RX ADMIN — CHOLECALCIFEROL TAB 25 MCG (1000 UNIT) 1000 UNITS: 25 TAB at 08:39

## 2024-03-30 RX ADMIN — BENZONATATE 100 MG: 100 CAPSULE ORAL at 20:59

## 2024-03-30 RX ADMIN — FLUCONAZOLE 150 MG: 150 TABLET ORAL at 15:23

## 2024-03-30 RX ADMIN — METHOCARBAMOL TABLETS 500 MG: 500 TABLET, COATED ORAL at 18:02

## 2024-03-30 RX ADMIN — FLUTICASONE PROPIONATE 1 SPRAY: 50 SPRAY, METERED NASAL at 08:47

## 2024-03-30 RX ADMIN — THIAMINE HCL TAB 100 MG 100 MG: 100 TAB at 08:38

## 2024-03-30 RX ADMIN — RISPERIDONE 1 MG: 1 TABLET, ORALLY DISINTEGRATING ORAL at 14:15

## 2024-03-30 NOTE — PROGRESS NOTES
Progress Note - Jo-Ann Lamb 53 y.o. female MRN: [id number]    Unit/Bed#: [facility] 254-01 Encounter: [id number]        Subjective:   Patient seen and examined at bedside after reviewing the chart and discussing the case with the caring staff.      Patient examined at bedside.  Patient complaining of vaginal itching.  Reports history of vaginal yeast infections and feels similar.  She also complains of chest congestion with some relief from mucinex.    Physical Exam   Vitals: Blood pressure 140/90, pulse 87, temperature 97.7 °F (36.5 °C), temperature source Temporal, resp. rate 16, height 5' (1.524 m), weight 64.4 kg (142 lb), last menstrual period 01/01/2020, SpO2 95%, not currently breastfeeding.,Body mass index is 27.73 kg/m².  Constitutional: Patient in no acute distress.  HEENT: PERR, EOMI, MMM.  Cardiovascular: Normal rate and regular rhythm.    Pulmonary/Chest: Effort normal and breath sounds normal.   Abdomen: Soft, + BS, NT.    Assessment/Plan:  Jo-Ann Lamb is a(n) 53 y.o. year old female with schizoaffective disorder.     Asthma.  Patient on Symbicort inhaler twice daily and albuterol as needed.   Hypertriglyceridemia.  Patient on Fenofibrate 145 mg daily. Lipid panel 3/23/24: cholesterol 221, triglycerides 211, . Consider statin.  F/up with PCP outpt.   Allergic rhinitis.  Patient on Flonase daily.   GERD. Patient on Protonix 40 mg daily.   Tobacco abuse. NRT.   Alcohol abuse.  Patient started on thiamine 100 mg, folic acid 1 mg, and multivitamin daily.   Chronic back pain.  Patient on Mobic 15 mg daily, Robaxin 500 mg twice daily, and lidocaine patch daily.   Vitamin D deficiency.  Continue daily supplement.   Cough/congestion.  Increase Mucinex to 1200 mg twice daily.  Tessalon pearls as needed.    Callus of R great toe.  Encouraged pt to f/u with podiatry outpatient.    Hemorrhoids/constipation.  Apply Anusol daily. Lactulose 20 mg as needed (pt requesting sorbitol).   Vaginal candidiasis.  Diflucan  150 mg x 1 dose 3/30.     The patient was discussed with Dr. Steel and he is in agreement with the above note.

## 2024-03-30 NOTE — PLAN OF CARE
Problem: Alteration in Thoughts and Perception  Goal: Treatment Goal: Gain control of psychotic behaviors/thinking, reduce/eliminate presenting symptoms and demonstrate improved reality functioning upon discharge  Outcome: Progressing  Goal: Refrain from acting on delusional thinking/internal stimuli  Description: Interventions:  - Monitor patient closely, per order   - Utilize least restrictive measures   - Set reasonable limits, give positive feedback for acceptable   - Administer medications as ordered and monitor of potential side effects  Outcome: Progressing     Problem: Risk for Self Injury/Neglect  Goal: Treatment Goal: Remain safe during length of stay, learn and adopt new coping skills, and be free of self-injurious ideation, impulses and acts at the time of discharge  Outcome: Progressing  Goal: Refrain from harming self  Description: Interventions:  - Monitor patient closely, per order  - Develop a trusting relationship  - Supervise medication ingestion, monitor effects and side effects   Outcome: Progressing  Goal: Recognize maladaptive responses and adopt new coping mechanisms  Outcome: Progressing     Problem: Depression  Goal: Treatment Goal: Demonstrate behavioral control of depressive symptoms, verbalize feelings of improved mood/affect, and adopt new coping skills prior to discharge  Outcome: Progressing     Problem: Anxiety  Goal: Anxiety is at manageable level  Description: Interventions:  - Assess and monitor patient's anxiety level.   - Monitor for signs and symptoms (heart palpitations, chest pain, shortness of breath, headaches, nausea, feeling jumpy, restlessness, irritable, apprehensive).   - Collaborate with interdisciplinary team and initiate plan and interventions as ordered.  - [place] patient to unit/surroundings  - Explain treatment plan  - Encourage participation in care  - Encourage verbalization of concerns/fears  - Identify coping mechanisms  - Assist in developing  anxiety-reducing skills  - Administer/offer alternative therapies  - Limit or eliminate stimulants  Outcome: Progressing   See chart note

## 2024-03-30 NOTE — NURSING NOTE
"Patient has been visible on the unit.  Patient was observed interacting appropriately with peers.  Denies s/I.  Reports she has been doing well in her independent living environment. Says she started to feel \"unstable\" and was unable to get to a point where she felt stabilized again without intervention.  She says now that her medications have been adjusted, she is feeling much better.  Denies any current a/vh.  Eye contact is good.  Answers assessment questions appropriately.  Brightens on approach   "

## 2024-03-30 NOTE — NURSING NOTE
Patient visible on milieu.Pleasant  and  cooperative.Report feeling  the depressed because of  her Mental Illness and she needs to break up  with her boyfriend  because  she  needs  to work on her self  and  he's not  answering  the phone.Report intermitted A/V hallucination.Schedule PO medication as  ordered.Attend  group.Continue  on safety.

## 2024-03-30 NOTE — NURSING NOTE
Patient report voices remain loud  and  will not  go away. Administered routine  Risperdal 2 mg as  ordered.

## 2024-03-30 NOTE — PROGRESS NOTES
Progress Note - Behavioral Health     Jo-Ann Lamb 53 y.o. female MRN: [id number]   Unit[facility]Bed#: [facility] 254-01 Encounter: [id number]    Behavior over the last 24 hours: slowly improving.     Jo-Ann CUMMINGS seen today, per staff report has been visible and appropriate on the unit.  Patient seen today reports auditory hallucinations are beginning to diminish.  Appears much less anxious.  Continues to have some delusional thoughts regarding ability for people to place things in her head.  Overall showing some improvements.  Compliant with medication.  Insight and judgment limited.         Mental Status Evaluation:    Appearance:  casually dressed, adequate grooming, looks stated age   Behavior:  cooperative   Speech:  normal rate, normal volume   Mood:  anxious   Affect:  blunted   Thought Process:  linear   Associations: circumstantial associations   Thought Content:  paranoid delusions   Perceptual Disturbances: auditory hallucinations   Risk Potential: Suicidal ideation - None  Homicidal ideation - None   Sensorium:  oriented to person, place, and time/date   Memory:  recent and remote memory grossly intact   Consciousness:  alert and awake   Attention: attention span and concentration are age appropriate   Insight:  limited   Judgment: limited   Gait/Station: normal gait/station   Motor Activity: no abnormal movements     Vital signs in last 24 hours:    Temp:  [97.7 °F (36.5 °C)] 97.7 °F (36.5 °C)  HR:  [87] 87  Resp:  [16] 16  BP: (140)/(90) 140/90    Laboratory results: I have personally reviewed all pertinent laboratory/tests results.        Assessment/Plan   Principal Problem:    Schizoaffective disorder, bipolar type (HCC)    Recommended Treatment:     Planned medication and treatment changes:  Clozaril increased to 400 mg nightly yesterday.  Continue current medications.  Patient will require a level prior to discharge with current increase  All current active medications have been reviewed  Encourage group therapy,  milieu therapy and occupational therapy  Behavioral Health checks every 7 minutes  Current Facility-Administered Medications   Medication Dose Route Frequency Provider Last Rate    acetaminophen  650 mg Oral Q6H PRN Taylor Castro MD      acetaminophen  650 mg Oral Q4H PRN Taylor Castro MD      acetaminophen  975 mg Oral Q6H PRN Taylor Castro MD      albuterol  2 puff Inhalation Q4H PRN Zandra Barclay PA-C      aluminum-magnesium hydroxide-simethicone  30 mL Oral Q4H PRN Taylor Castro MD      benzonatate  100 mg Oral TID Zandra Barclay PA-C      benztropine  1 mg Oral Q4H PRN Max 6/day Taylor Castro MD      bisacodyl  10 mg Rectal Daily PRN Taylor Castro MD      budesonide-formoterol  2 puff Inhalation BID Zandra Barclay PA-C      cholecalciferol  1,000 Units Oral QAM Zandra Barclay PA-C      cloZAPine  400 mg Oral HS JARED Bolton      hydrOXYzine HCL  50 mg Oral Q6H PRN Max 4/day Taylor Castro MD      Or    diphenhydrAMINE  50 mg Intramuscular Q6H PRN Taylor Castro MD      fenofibrate  145 mg Oral Daily Zandra Barclay PA-C      fluticasone  1 spray Each Nare Daily Zandra Barclay PA-C      folic acid  1 mg Oral Daily Zandra Barclay PA-C      guaiFENesin  1,200 mg Oral Q12H [facility] Conrad PA-C      hydrocortisone   Topical 4x Daily PRN Zandra Barclay PA-C      hydrOXYzine HCL  100 mg Oral Q6H PRN Max 4/day Taylor Castro MD      Or    LORazepam  2 mg Intramuscular Q6H PRN Taylor Castro MD      hydrOXYzine HCL  25 mg Oral Q6H PRN Max 4/day Taylor Castro MD      lactulose  20 g Oral Daily PRN Zandra Barclay PA-C      lamoTRIgine  100 mg Oral BID Vadim Dakota, DO      lidocaine  1 patch Topical Daily Zandra Barclay PA-C      LORazepam  1 mg Oral QPM Vadim Dakota, DO      meloxicam  15 mg Oral Daily Zandra Barclay PA-C      methocarbamol  500 mg Oral BID Zandra Osorio  MARTINE Barclay      multivitamin-minerals  1 tablet Oral Daily Zandra Barclay PA-C      nicotine  1 patch Transdermal Daily Vadim Duncan, DO      nicotine polacrilex  4 mg Oral Q2H PRN Taylor Castro MD      pantoprazole  40 mg Oral Daily Before Breakfast Zandra Barclay PA-C      polyethylene glycol  17 g Oral Daily PRN Taylor Castro MD      risperiDONE  1 mg Oral Q6H PRN Max 3/day JARED Bolton      risperiDONE  2 mg Oral BID JARED Bolton      senna-docusate sodium  1 tablet Oral Daily PRN Taylor Castro MD      senna-docusate sodium  1 tablet Oral BID JARED Bolton      thiamine  100 mg Oral Daily Zandra Barclay PA-C      traZODone  50 mg Oral HS PRSHONA Castro MD      venlafaxine  150 mg Oral Daily Vadim Duncan, DO         Risks / Benefits of Treatment:    Risks, benefits, and possible side effects of medications explained to patient and patient verbalizes understanding and agreement for treatment.    Counseling / Coordination of Care:    Total floor / unit time spent today 25 minutes. Greater than 50% of total time was spent with the patient and / or family counseling and / or coordination of care. A description of counseling / coordination of care:  Patient's progress discussed with staff in treatment team meeting.  Medications, treatment progress and treatment plan reviewed with patient.    Leny Cardenas MD 03/30/24

## 2024-03-31 PROCEDURE — 99232 SBSQ HOSP IP/OBS MODERATE 35: CPT | Performed by: HOSPITALIST

## 2024-03-31 RX ORDER — SIMETHICONE 80 MG
80 TABLET,CHEWABLE ORAL
Status: DISCONTINUED | OUTPATIENT
Start: 2024-03-31 | End: 2024-04-10 | Stop reason: HOSPADM

## 2024-03-31 RX ADMIN — THIAMINE HCL TAB 100 MG 100 MG: 100 TAB at 08:35

## 2024-03-31 RX ADMIN — METHOCARBAMOL TABLETS 500 MG: 500 TABLET, COATED ORAL at 17:21

## 2024-03-31 RX ADMIN — NICOTINE 1 PATCH: 21 PATCH, EXTENDED RELEASE TRANSDERMAL at 08:39

## 2024-03-31 RX ADMIN — Medication 1 TABLET: at 08:38

## 2024-03-31 RX ADMIN — RISPERIDONE 2 MG: 2 TABLET ORAL at 08:38

## 2024-03-31 RX ADMIN — BUDESONIDE AND FORMOTEROL FUMARATE DIHYDRATE 2 PUFF: 160; 4.5 AEROSOL RESPIRATORY (INHALATION) at 08:39

## 2024-03-31 RX ADMIN — GUAIFENESIN 1200 MG: 600 TABLET ORAL at 08:35

## 2024-03-31 RX ADMIN — PANTOPRAZOLE SODIUM 40 MG: 40 TABLET, DELAYED RELEASE ORAL at 09:26

## 2024-03-31 RX ADMIN — LORAZEPAM 1 MG: 1 TABLET ORAL at 17:21

## 2024-03-31 RX ADMIN — CLOZAPINE 400 MG: 100 TABLET ORAL at 21:08

## 2024-03-31 RX ADMIN — FLUTICASONE PROPIONATE 1 SPRAY: 50 SPRAY, METERED NASAL at 08:39

## 2024-03-31 RX ADMIN — LAMOTRIGINE 100 MG: 100 TABLET ORAL at 08:35

## 2024-03-31 RX ADMIN — SENNOSIDES AND DOCUSATE SODIUM 1 TABLET: 8.6; 5 TABLET ORAL at 17:21

## 2024-03-31 RX ADMIN — BENZONATATE 100 MG: 100 CAPSULE ORAL at 21:07

## 2024-03-31 RX ADMIN — FOLIC ACID 1 MG: 1 TABLET ORAL at 08:35

## 2024-03-31 RX ADMIN — MELOXICAM 15 MG: 7.5 TABLET ORAL at 08:34

## 2024-03-31 RX ADMIN — SENNOSIDES AND DOCUSATE SODIUM 1 TABLET: 8.6; 5 TABLET ORAL at 08:40

## 2024-03-31 RX ADMIN — SIMETHICONE 80 MG: 80 TABLET, CHEWABLE ORAL at 17:23

## 2024-03-31 RX ADMIN — BENZONATATE 100 MG: 100 CAPSULE ORAL at 08:34

## 2024-03-31 RX ADMIN — BUDESONIDE AND FORMOTEROL FUMARATE DIHYDRATE 2 PUFF: 160; 4.5 AEROSOL RESPIRATORY (INHALATION) at 17:24

## 2024-03-31 RX ADMIN — GUAIFENESIN 1200 MG: 600 TABLET ORAL at 21:08

## 2024-03-31 RX ADMIN — RISPERIDONE 2 MG: 2 TABLET ORAL at 15:20

## 2024-03-31 RX ADMIN — LAMOTRIGINE 100 MG: 100 TABLET ORAL at 17:21

## 2024-03-31 RX ADMIN — HYDROCORTISONE: 25 CREAM TOPICAL at 09:37

## 2024-03-31 RX ADMIN — SIMETHICONE 80 MG: 80 TABLET, CHEWABLE ORAL at 21:08

## 2024-03-31 RX ADMIN — SIMETHICONE 80 MG: 80 TABLET, CHEWABLE ORAL at 13:56

## 2024-03-31 RX ADMIN — METHOCARBAMOL TABLETS 500 MG: 500 TABLET, COATED ORAL at 08:35

## 2024-03-31 RX ADMIN — VENLAFAXINE HYDROCHLORIDE 150 MG: 150 CAPSULE, EXTENDED RELEASE ORAL at 08:35

## 2024-03-31 RX ADMIN — FENOFIBRATE 145 MG: 145 TABLET, FILM COATED ORAL at 08:34

## 2024-03-31 RX ADMIN — CHOLECALCIFEROL TAB 25 MCG (1000 UNIT) 1000 UNITS: 25 TAB at 08:35

## 2024-03-31 RX ADMIN — LORAZEPAM 2 MG: 2 INJECTION INTRAMUSCULAR; INTRAVENOUS at 14:00

## 2024-03-31 RX ADMIN — BENZONATATE 100 MG: 100 CAPSULE ORAL at 15:20

## 2024-03-31 RX ADMIN — LACTULOSE 20 G: 20 SOLUTION ORAL at 17:23

## 2024-03-31 RX ADMIN — RISPERIDONE 1 MG: 1 TABLET, ORALLY DISINTEGRATING ORAL at 11:33

## 2024-03-31 RX ADMIN — LIDOCAINE 5% 1 PATCH: 700 PATCH TOPICAL at 08:39

## 2024-03-31 NOTE — PROGRESS NOTES
Progress Note - Jo-Ann Lamb 53 y.o. female MRN: [id number]    Unit/Bed#: [facility] 254-01 Encounter: [id number]        Subjective:   Patient seen and examined at bedside after reviewing the chart and discussing the case with the caring staff.      Patient examined at bedside.  Patient reports congestion improved with increased dose of Mucinex.  She is complaining of hard stools and increased gas/bloating.  Last BM was yesterday and reports having BM every 1-2 days.  Also reports urinary frequency with episodes of incontinence for past several years.    Physical Exam   Vitals: Blood pressure 120/82, pulse 72, temperature 97.9 °F (36.6 °C), temperature source Temporal, resp. rate 16, height 5' (1.524 m), weight 64.8 kg (142 lb 12.8 oz), last menstrual period 01/01/2020, SpO2 96%, not currently breastfeeding.,Body mass index is 27.89 kg/m².  Constitutional: Patient in no acute distress.  HEENT: PERR, EOMI, MMM.  Cardiovascular: Normal rate and regular rhythm.    Pulmonary/Chest: Effort normal and breath sounds normal.   Abdomen: Soft, + BS, NT, no rebound, no guarding, no rigidity.     Assessment/Plan:  Jo-Ann Lamb is a(n) 53 y.o. year old female with schizoaffective disorder.     Asthma.  Patient on Symbicort inhaler twice daily and albuterol as needed.   Hypertriglyceridemia.  Patient on Fenofibrate 145 mg daily. Lipid panel 3/23/24: cholesterol 221, triglycerides 211, . Consider statin.  F/up with PCP outpt.   Allergic rhinitis.  Patient on Flonase daily.   GERD. Patient on Protonix 40 mg daily.   Tobacco abuse. NRT.   Alcohol abuse.  Patient started on thiamine 100 mg, folic acid 1 mg, and multivitamin daily.   Chronic back pain.  Patient on Mobic 15 mg daily, Robaxin 500 mg twice daily, and lidocaine patch daily.   Vitamin D deficiency.  Continue daily supplement.   Cough/congestion.  Increase Mucinex to 1200 mg twice daily 3/30.  Tessalon pearls as needed.    Callus of R great toe.  Encouraged pt to f/u with  podiatry outpatient.    Constipation/hemorrhoids/flatulence.  On senokot S twice daily.  Miralax and lactulose as needed.  Apply Anusol daily.  Add simethicone 80 mg QID.    Vaginal candidiasis.  Diflucan 150 mg x 1 dose 3/30.    Overactive bladder.  Would avoid ditropan at this time due to possible worsening constipation with Clozaril.  Recommended pelvic floor muscle exercises.  Patient encouraged to follow-up with PCP/gyn on outpatient basis.     The patient was discussed with Dr. Steel and he is in agreement with the above note.

## 2024-03-31 NOTE — PLAN OF CARE
Problem: Alteration in Thoughts and Perception  Goal: Refrain from acting on delusional thinking/internal stimuli  Description: Interventions:  - Monitor patient closely, per order   - Utilize least restrictive measures   - Set reasonable limits, give positive feedback for acceptable   - Administer medications as ordered and monitor of potential side effects  Outcome: Progressing     Problem: Risk for Self Injury/Neglect  Goal: Treatment Goal: Remain safe during length of stay, learn and adopt new coping skills, and be free of self-injurious ideation, impulses and acts at the time of discharge  Outcome: Progressing  Goal: Refrain from harming self  Description: Interventions:  - Monitor patient closely, per order  - Develop a trusting relationship  - Supervise medication ingestion, monitor effects and side effects   Outcome: Progressing  Goal: Recognize maladaptive responses and adopt new coping mechanisms  Outcome: Progressing     Problem: Depression  Goal: Treatment Goal: Demonstrate behavioral control of depressive symptoms, verbalize feelings of improved mood/affect, and adopt new coping skills prior to discharge  Outcome: Progressing  Goal: Refrain from self-neglect  Outcome: Progressing     Problem: Anxiety  Goal: Anxiety is at manageable level  Description: Interventions:  - Assess and monitor patient's anxiety level.   - Monitor for signs and symptoms (heart palpitations, chest pain, shortness of breath, headaches, nausea, feeling jumpy, restlessness, irritable, apprehensive).   - Collaborate with interdisciplinary team and initiate plan and interventions as ordered.  - [place] patient to unit/surroundings  - Explain treatment plan  - Encourage participation in care  - Encourage verbalization of concerns/fears  - Identify coping mechanisms  - Assist in developing anxiety-reducing skills  - Administer/offer alternative therapies  - Limit or eliminate stimulants  Outcome: Progressing   See chart note

## 2024-03-31 NOTE — NURSING NOTE
Patient visible on milieu.Pleasant and cooperative.Flat affect.Depressed. Appears internal preoccupied.Schedule PO medication as ordered. Continue  to endorse intermitted  A/V hallucination.Denies SI HI Continue on safety check.

## 2024-03-31 NOTE — PROGRESS NOTES
"Progress Note - Behavioral Health     Jo-Ann Lamb 53 y.o. female MRN: [id number]   Unit/Bed#: [facility] 254-01 Encounter: [id number]    Behavior over the last 24 hours: unchanged.     Jo-Ann CUMMINGS seen today, per staff report has been withdrawn on the unit.  Per nursing staff patient responding to internal stimuli in her room last night.  On exam today patient reports feeling \"fine\".  She subjectively denies having heard any increase auditory hallucinations yesterday.  Reports she ongoing subjective improvement.  She does appear somewhat anxious today.  Preoccupied but does not appear responding to internal stimuli.  Denies thoughts of self-harm or of others.  Insight and judgment limited         Mental Status Evaluation:    Appearance:  casually dressed, marginal hygiene, looks older than stated age   Behavior:  calm, guarded   Speech:  slow, scant, soft   Mood:  dysphoric   Affect:  blunted   Thought Process:  linear, concrete   Associations: circumstantial associations   Thought Content:  some paranoia   Perceptual Disturbances: denies auditory hallucinations when asked, appears preoccupied   Risk Potential: Suicidal ideation - None  Homicidal ideation - None   Sensorium:  oriented to person, place, and time/date   Memory:  recent and remote memory grossly intact   Consciousness:  alert and awake   Attention: decreased concentration and decreased attention span   Insight:  limited   Judgment: limited   Gait/Station: normal gait/station   Motor Activity: no abnormal movements     Vital signs in last 24 hours:    Temp:  [97.9 °F (36.6 °C)] 97.9 °F (36.6 °C)  HR:  [72-77] 72  Resp:  [16] 16  BP: (120-126)/(82-86) 120/82    Laboratory results: I have personally reviewed all pertinent laboratory/tests results.        Assessment/Plan   Principal Problem:    Schizoaffective disorder, bipolar type (HCC)    Recommended Treatment:     Planned medication and treatment changes:  Clozaril increased to 400 mg at bedtime on March 29.  We " will continue current medication.  Obtain Clozaril level prior to discharge.  Monitor for further psychotic symptoms and need for further changes.  May consider increasing Risperdal.    All current active medications have been reviewed  Encourage group therapy, milieu therapy and occupational therapy  Behavioral Health checks every 7 minutes  Current Facility-Administered Medications   Medication Dose Route Frequency Provider Last Rate    acetaminophen  650 mg Oral Q6H PRN Taylor Castro MD      acetaminophen  650 mg Oral Q4H PRN Taylor Castro MD      acetaminophen  975 mg Oral Q6H PRN Taylor Castro MD      albuterol  2 puff Inhalation Q4H PRN Zandra Barclay PA-C      aluminum-magnesium hydroxide-simethicone  30 mL Oral Q4H PRN Taylor Castro MD      benzonatate  100 mg Oral TID Zandra Barclay PA-C      benztropine  1 mg Oral Q4H PRN Max 6/day Taylor Castro MD      bisacodyl  10 mg Rectal Daily PRN Taylor Castro MD      budesonide-formoterol  2 puff Inhalation BID Zandra Barclay PA-C      cholecalciferol  1,000 Units Oral QAM Zandra Barclay PA-C      cloZAPine  400 mg Oral HS JARED Bolton      hydrOXYzine HCL  50 mg Oral Q6H PRN Max 4/day Taylor Castro MD      Or    diphenhydrAMINE  50 mg Intramuscular Q6H PRN Taylor Castro MD      fenofibrate  145 mg Oral Daily Zandra Barclay PA-C      fluticasone  1 spray Each Nare Daily Zandra Barclay PA-C      folic acid  1 mg Oral Daily Zandra Barclay PA-C      guaiFENesin  1,200 mg Oral Q12H [facility] PA-C      hydrocortisone   Topical 4x Daily PRN Zandra Barclay PA-C      hydrOXYzine HCL  100 mg Oral Q6H PRN Max 4/day Taylor Castro MD      Or    LORazepam  2 mg Intramuscular Q6H PRN Taylor Castro MD      hydrOXYzine HCL  25 mg Oral Q6H PRN Max 4/day Taylor Castro MD      lactulose  20 g Oral Daily PRN Zandra Barclay PA-C       lamoTRIgine  100 mg Oral BID Vadim Dakota, DO      lidocaine  1 patch Topical Daily Zandra Barclay PA-C      LORazepam  1 mg Oral QPM Vadim Duncan, DO      meloxicam  15 mg Oral Daily Zandra Barclay PA-C      methocarbamol  500 mg Oral BID Zandra Barclay PA-C      multivitamin-minerals  1 tablet Oral Daily Zandra Barclay PA-C      nicotine  1 patch Transdermal Daily Vadim Duncan, DO      nicotine polacrilex  4 mg Oral Q2H PRN Taylor Castro MD      pantoprazole  40 mg Oral Daily Before Breakfast Zandra Barclay PA-C      polyethylene glycol  17 g Oral Daily PRN Taylor Castro MD      risperiDONE  1 mg Oral Q6H PRN Max 3/day JARED Bolton      risperiDONE  2 mg Oral BID JARED Bolton      senna-docusate sodium  1 tablet Oral Daily PRN Taylor Castro MD      senna-docusate sodium  1 tablet Oral BID JARED Bolton      thiamine  100 mg Oral Daily Zandra Barclay PA-C      traZODone  50 mg Oral HS PRN Taylor Castro MD      venlafaxine  150 mg Oral Daily Vadim Duncan, DO         Risks / Benefits of Treatment:    Risks, benefits, and possible side effects of medications explained to patient and patient verbalizes understanding and agreement for treatment.    Counseling / Coordination of Care:    Total floor / unit time spent today 25 minutes. Greater than 50% of total time was spent with the patient and / or family counseling and / or coordination of care. A description of counseling / coordination of care:  Patient's progress discussed with staff in treatment team meeting.  Medications, treatment progress and treatment plan reviewed with patient.    Leny Cardenas MD 03/31/24

## 2024-03-31 NOTE — NURSING NOTE
"Patient has been visible on the unit.  Interacting with select peers.  Patient reports decreased AH after her medications this afternoon, however delusional thoughts have been observed.  Patient asked if the Drs have been trying to give her botox to make her ugly.  Reassured patient that no one is trying to give her botox.  Later her roommate came up and stated that she was whispering to herself in bed and saying \"I'm not going to kill her\". Will monitor situation and assess for need for no roommate  "

## 2024-03-31 NOTE — TREATMENT TEAM
03/31/24 1400   [place] Anxiety Scale   Anxious Mood 4   Tension 4   Fears 4   Insomnia 0   Intellectual 4   Depressed Mood 4   Somatic Complaints: Muscular 4   Somatic Complaints: Sensory 4   Cardiovascular Symptoms 0   Respiratory Symptoms 0   Gastrointestinal Symptoms 0   Genitourinary Symptoms 0   Autonomic Symptoms 4   Behavior at Interview 4   [place] Anxiety Score 36     Patient report severe anxiety due  to auditory hallucination.Ativan 2 mg  administered  to right  deltoid tolerated  well.

## 2024-04-01 LAB
HBV SURFACE AG SER QL: NORMAL
HCV AB SER QL: NORMAL
HIV 1+2 AB+HIV1 P24 AG SERPL QL IA: NORMAL
HIV 2 AB SERPL QL IA: NORMAL
HIV1 AB SERPL QL IA: NORMAL
HIV1 P24 AG SERPL QL IA: NORMAL
TREPONEMA PALLIDUM IGG+IGM AB [PRESENCE] IN SERUM OR PLASMA BY IMMUNOASSAY: NORMAL

## 2024-04-01 PROCEDURE — 87491 CHLMYD TRACH DNA AMP PROBE: CPT

## 2024-04-01 PROCEDURE — 86780 TREPONEMA PALLIDUM: CPT

## 2024-04-01 PROCEDURE — 99232 SBSQ HOSP IP/OBS MODERATE 35: CPT | Performed by: NURSE PRACTITIONER

## 2024-04-01 PROCEDURE — 87340 HEPATITIS B SURFACE AG IA: CPT

## 2024-04-01 PROCEDURE — 86803 HEPATITIS C AB TEST: CPT

## 2024-04-01 PROCEDURE — 87389 HIV-1 AG W/HIV-1&-2 AB AG IA: CPT

## 2024-04-01 PROCEDURE — 87591 N.GONORRHOEAE DNA AMP PROB: CPT

## 2024-04-01 RX ORDER — HYDROXYZINE 50 MG/1
100 TABLET, FILM COATED ORAL
Status: DISCONTINUED | OUTPATIENT
Start: 2024-04-01 | End: 2024-04-10 | Stop reason: HOSPADM

## 2024-04-01 RX ORDER — LORAZEPAM 2 MG/ML
1 INJECTION INTRAMUSCULAR EVERY 6 HOURS PRN
Status: DISCONTINUED | OUTPATIENT
Start: 2024-04-01 | End: 2024-04-10 | Stop reason: HOSPADM

## 2024-04-01 RX ADMIN — SIMETHICONE 80 MG: 80 TABLET, CHEWABLE ORAL at 21:24

## 2024-04-01 RX ADMIN — SIMETHICONE 80 MG: 80 TABLET, CHEWABLE ORAL at 08:32

## 2024-04-01 RX ADMIN — NICOTINE 1 PATCH: 21 PATCH, EXTENDED RELEASE TRANSDERMAL at 09:49

## 2024-04-01 RX ADMIN — HYDROCORTISONE 1 APPLICATION: 25 CREAM TOPICAL at 09:53

## 2024-04-01 RX ADMIN — RISPERIDONE 2 MG: 2 TABLET ORAL at 17:19

## 2024-04-01 RX ADMIN — METHOCARBAMOL TABLETS 500 MG: 500 TABLET, COATED ORAL at 17:19

## 2024-04-01 RX ADMIN — RISPERIDONE 1 MG: 1 TABLET, ORALLY DISINTEGRATING ORAL at 13:26

## 2024-04-01 RX ADMIN — SIMETHICONE 80 MG: 80 TABLET, CHEWABLE ORAL at 21:27

## 2024-04-01 RX ADMIN — SIMETHICONE 80 MG: 80 TABLET, CHEWABLE ORAL at 10:48

## 2024-04-01 RX ADMIN — FLUTICASONE PROPIONATE 1 SPRAY: 50 SPRAY, METERED NASAL at 09:49

## 2024-04-01 RX ADMIN — LIDOCAINE 5% 1 PATCH: 700 PATCH TOPICAL at 09:49

## 2024-04-01 RX ADMIN — RISPERIDONE 2 MG: 2 TABLET ORAL at 08:32

## 2024-04-01 RX ADMIN — LORAZEPAM 1 MG: 1 TABLET ORAL at 17:20

## 2024-04-01 RX ADMIN — GUAIFENESIN 1200 MG: 600 TABLET ORAL at 08:31

## 2024-04-01 RX ADMIN — BENZONATATE 100 MG: 100 CAPSULE ORAL at 21:23

## 2024-04-01 RX ADMIN — Medication 1 TABLET: at 08:32

## 2024-04-01 RX ADMIN — HYDROCORTISONE: 25 CREAM TOPICAL at 18:07

## 2024-04-01 RX ADMIN — SIMETHICONE 80 MG: 80 TABLET, CHEWABLE ORAL at 17:20

## 2024-04-01 RX ADMIN — CLOZAPINE 400 MG: 100 TABLET ORAL at 21:24

## 2024-04-01 RX ADMIN — FOLIC ACID 1 MG: 1 TABLET ORAL at 08:32

## 2024-04-01 RX ADMIN — LAMOTRIGINE 100 MG: 100 TABLET ORAL at 17:20

## 2024-04-01 RX ADMIN — GUAIFENESIN 1200 MG: 600 TABLET ORAL at 21:27

## 2024-04-01 RX ADMIN — MELOXICAM 15 MG: 7.5 TABLET ORAL at 08:31

## 2024-04-01 RX ADMIN — VENLAFAXINE HYDROCHLORIDE 150 MG: 150 CAPSULE, EXTENDED RELEASE ORAL at 08:32

## 2024-04-01 RX ADMIN — FENOFIBRATE 145 MG: 145 TABLET, FILM COATED ORAL at 08:32

## 2024-04-01 RX ADMIN — CHOLECALCIFEROL TAB 25 MCG (1000 UNIT) 1000 UNITS: 25 TAB at 08:31

## 2024-04-01 RX ADMIN — THIAMINE HCL TAB 100 MG 100 MG: 100 TAB at 08:31

## 2024-04-01 RX ADMIN — CLOZAPINE 400 MG: 100 TABLET ORAL at 21:27

## 2024-04-01 RX ADMIN — BENZONATATE 100 MG: 100 CAPSULE ORAL at 17:20

## 2024-04-01 RX ADMIN — SENNOSIDES AND DOCUSATE SODIUM 1 TABLET: 8.6; 5 TABLET ORAL at 17:20

## 2024-04-01 RX ADMIN — BENZONATATE 100 MG: 100 CAPSULE ORAL at 08:32

## 2024-04-01 RX ADMIN — BUDESONIDE AND FORMOTEROL FUMARATE DIHYDRATE 2 PUFF: 160; 4.5 AEROSOL RESPIRATORY (INHALATION) at 08:35

## 2024-04-01 RX ADMIN — PANTOPRAZOLE SODIUM 40 MG: 40 TABLET, DELAYED RELEASE ORAL at 08:31

## 2024-04-01 RX ADMIN — BUDESONIDE AND FORMOTEROL FUMARATE DIHYDRATE 2 PUFF: 160; 4.5 AEROSOL RESPIRATORY (INHALATION) at 18:06

## 2024-04-01 RX ADMIN — LAMOTRIGINE 100 MG: 100 TABLET ORAL at 08:32

## 2024-04-01 RX ADMIN — LACTULOSE 20 G: 20 SOLUTION ORAL at 17:20

## 2024-04-01 RX ADMIN — BENZONATATE 100 MG: 100 CAPSULE ORAL at 21:27

## 2024-04-01 RX ADMIN — METHOCARBAMOL TABLETS 500 MG: 500 TABLET, COATED ORAL at 08:31

## 2024-04-01 RX ADMIN — SENNOSIDES AND DOCUSATE SODIUM 1 TABLET: 8.6; 5 TABLET ORAL at 08:31

## 2024-04-01 NOTE — PLAN OF CARE
Problem: Alteration in Thoughts and Perception  Goal: Treatment Goal: Gain control of psychotic behaviors/thinking, reduce/eliminate presenting symptoms and demonstrate improved reality functioning upon discharge  Outcome: Progressing  Goal: Refrain from acting on delusional thinking/internal stimuli  Description: Interventions:  - Monitor patient closely, per order   - Utilize least restrictive measures   - Set reasonable limits, give positive feedback for acceptable   - Administer medications as ordered and monitor of potential side effects  Outcome: Progressing     Problem: Risk for Self Injury/Neglect  Goal: Treatment Goal: Remain safe during length of stay, learn and adopt new coping skills, and be free of self-injurious ideation, impulses and acts at the time of discharge  Outcome: Progressing  Goal: Refrain from harming self  Description: Interventions:  - Monitor patient closely, per order  - Develop a trusting relationship  - Supervise medication ingestion, monitor effects and side effects   Outcome: Progressing     Problem: Depression  Goal: Treatment Goal: Demonstrate behavioral control of depressive symptoms, verbalize feelings of improved mood/affect, and adopt new coping skills prior to discharge  Outcome: Progressing     Problem: Anxiety  Goal: Anxiety is at manageable level  Description: Interventions:  - Assess and monitor patient's anxiety level.   - Monitor for signs and symptoms (heart palpitations, chest pain, shortness of breath, headaches, nausea, feeling jumpy, restlessness, irritable, apprehensive).   - Collaborate with interdisciplinary team and initiate plan and interventions as ordered.  - [place] patient to unit/surroundings  - Explain treatment plan  - Encourage participation in care  - Encourage verbalization of concerns/fears  - Identify coping mechanisms  - Assist in developing anxiety-reducing skills  - Administer/offer alternative therapies  - Limit or eliminate  stimulants  Outcome: Progressing   See chart note

## 2024-04-01 NOTE — PROGRESS NOTES
04/01/24   Team Meeting   Meeting Type Daily Rounds   Team Members Present   Team Members Present Physician;Nurse;Occupational Therapist;   Physician Team Member Dr Florencio LARSON; Yair COLEMAN   Nursing Team Member Agustin JIMENES   Social Work Team Member Abelardo HOPSON   OT Team Member Gurjit HUITRON   Patient/Family Present   Patient Present No   Patient's Family Present No     PRN for AH, ativan IM PRN yest, restless, increased anx, meds adjusted, starting to reality test; no dc date.

## 2024-04-01 NOTE — NURSING NOTE
Patient received PRN Risperdal at 1326 r/t psychosis. Patient appears internally preoccupied & distracted during conversation, pacing hallways. Patient now resting in bed, Risperdal seemingly effective. Q 7 minute checks maintained.

## 2024-04-01 NOTE — NURSING NOTE
"Patient out of her room for evening snack but otherwise withdrawn to room.  Consistent with day shift report, patient states she struggled throughout the day with AH.  She does report that the ativan she received was helpful in that it enabled her to deal with the voices until they subsided.  Reports very minimal AH at this time.  Intermittent \"quiet\" voices that she is able to manage  Denies any current s/I.  Did not require any prn medications this evening  "

## 2024-04-01 NOTE — SOCIAL WORK
Sw met with pt for check in as pt paced hallways due to excess energy. Pt denies current SI/HI/AVH/dep/anx, reports fee\eling calm in the current moment. Pt presents with underlying irritable edge. Support, reassurance provided.

## 2024-04-01 NOTE — SOCIAL WORK
Progress update provided to Anuja Segundo <[email or web address]>, requesting update on pt progress/baseline from visiting ACT team.

## 2024-04-01 NOTE — PROGRESS NOTES
Progress Note - Jo-Ann Lamb 53 y.o. female MRN: [id number]    Unit/Bed#: [facility] 254-01 Encounter: [id number]        Subjective:   Patient seen and examined at bedside after reviewing the chart and discussing the case with the caring staff.      Patient examined at bedside.  Patient is requesting STD testing. She denies any acute symptoms.     Labs pending.     Physical Exam   Vitals: Blood pressure 133/80, pulse 82, temperature 98.5 °F (36.9 °C), temperature source Temporal, resp. rate 17, height 5' (1.524 m), weight 64.8 kg (142 lb 12.8 oz), last menstrual period 01/01/2020, SpO2 98%, not currently breastfeeding.,Body mass index is 27.89 kg/m².  Constitutional: Patient in no acute distress.  HEENT: PERR, EOMI, MMM.  Cardiovascular: Normal rate and regular rhythm.    Pulmonary/Chest: Effort normal and breath sounds normal.   Abdomen: Soft, + BS, NT, no rebound, no guarding, no rigidity.     Assessment/Plan:  Jo-Ann Lamb is a(n) 53 y.o. year old female with schizoaffective disorder.     Asthma.  Patient on Symbicort inhaler twice daily and albuterol as needed.   Hypertriglyceridemia.  Patient on Fenofibrate 145 mg daily. Lipid panel 3/23/24: cholesterol 221, triglycerides 211, . Consider statin.  F/up with PCP outpt.   Allergic rhinitis.  Patient on Flonase daily.   GERD. Patient on Protonix 40 mg daily.   Tobacco abuse. NRT.   Alcohol abuse.  Patient started on thiamine 100 mg, folic acid 1 mg, and multivitamin daily.   Chronic back pain.  Patient on Mobic 15 mg daily, Robaxin 500 mg twice daily, and lidocaine patch daily.   Vitamin D deficiency.  Continue daily supplement.   Cough/congestion.  Increase Mucinex to 1200 mg twice daily 3/30.  Tessalon pearls as needed.    Callus of R great toe.  Encouraged pt to f/u with podiatry outpatient.    Constipation/hemorrhoids/flatulence.  On senokot S twice daily.  Miralax and lactulose as needed.  Apply Anusol daily.  Add simethicone 80 mg QID.    Vaginal candidiasis.   Diflucan 150 mg x 1 dose 3/30.    Overactive bladder.  Would avoid ditropan at this time due to possible worsening constipation with Clozaril.  Recommended pelvic floor muscle exercises.  Patient encouraged to follow-up with PCP/gyn on outpatient basis.     The patient was discussed with Dr. Steel and he is in agreement with the above note.

## 2024-04-01 NOTE — PROGRESS NOTES
"Progress Note - Behavioral Health   Jo-Ann Lamb 53 y.o. female MRN: [id number]  Unit/Bed#: [facility] 254-01 Encounter: [id number]    Assessment/Plan   Principal Problem:    Schizoaffective disorder, bipolar type (HCC)      Behavior over the last 24 hours:  unchanged  Sleep: normal  Appetite: normal  Medication side effects: No  ROS: no complaints and all other systems are negative    Jo-Ann reports she is \"feeling a lot better today\" and presents with constricted affect.  Reports increased intensity and frequency of noncommanding AH over the weekend.  Further describes AH as \"commentary\" and stating things like \"she is thorough.\"  Appears intermittently preoccupied throughout encounter but able to maintain linear conversation.  Some paranoia persists.  Preservative on medications, but responds well to redirection.  Otherwise maintaining safety, hygiene, appetite, and sleep.      Mental Status Evaluation:  Appearance:  age appropriate, casually dressed, and well-groomed, wearing robe   Behavior:  Cooperative, calm, good eye contact, intermittently distracted   Speech:  normal pitch and normal volume   Mood:  \"I'm feeling a lot better today.\"   Affect:  constricted and mood-congruent   Thought Process:  circumstantial, illogical at times   Associations: circumstantial associations   Thought Content:  Bizarre and paranoid delusions   Perceptual Disturbances: AH of \"intercom system,\" noncommanding, intermittent commentary   Risk Potential: Suicidal Ideations none  Homicidal Ideations none  Potential for Aggression No   Sensorium:  person, place, time/date, and situation   Memory:  recent and remote memory grossly intact   Consciousness:  alert and awake    Attention: attention span appeared shorter than expected for age   Insight:  limited   Judgment: limited   Gait/Station: normal gait/station and normal balance   Motor Activity: no abnormal movements     Progress Toward Goals: Slowly improving.  Reports decrease intensity and " frequency of AH.  Denies SI and has been maintaining safety.  Clozaril titrated 400 mg nightly 3/29/2024.  Will repeat Clozaril level 4/24/24 with weekly CBCD.  No medication changes indicated today due to improvement.  Utilizing 2 antipsychotics concurrently for augmentation of Clozaril therapy and history of multiple failed monotherapy trials.  No discharge date at this time.    Recommended Treatment: Continue with group therapy, milieu therapy and occupational therapy.      Risks, benefits and possible side effects of Medications:   Jo-Ann has limited understanding of risk versus benefits of medications, but agrees to take as prescribed.    Medications: all current active meds have been reviewed, continue current psychiatric medications, and current meds:   Current Facility-Administered Medications   Medication Dose Route Frequency    acetaminophen (TYLENOL) tablet 650 mg  650 mg Oral Q6H PRN    acetaminophen (TYLENOL) tablet 650 mg  650 mg Oral Q4H PRN    acetaminophen (TYLENOL) tablet 975 mg  975 mg Oral Q6H PRN    albuterol (PROVENTIL HFA,VENTOLIN HFA) inhaler 2 puff  2 puff Inhalation Q4H PRN    aluminum-magnesium hydroxide-simethicone (MAALOX) oral suspension 30 mL  30 mL Oral Q4H PRN    benzonatate (TESSALON PERLES) capsule 100 mg  100 mg Oral TID    benztropine (COGENTIN) tablet 1 mg  1 mg Oral Q4H PRN Max 6/day    bisacodyl (DULCOLAX) rectal suppository 10 mg  10 mg Rectal Daily PRN    budesonide-formoterol (SYMBICORT) 160-4.5 mcg/act inhaler 2 puff  2 puff Inhalation BID    Cholecalciferol (VITAMIN D3) tablet 1,000 Units  1,000 Units Oral QAM    cloZAPine (CLOZARIL) tablet 400 mg  400 mg Oral HS    hydrOXYzine HCL (ATARAX) tablet 50 mg  50 mg Oral Q6H PRN Max 4/day    Or    diphenhydrAMINE (BENADRYL) injection 50 mg  50 mg Intramuscular Q6H PRN    fenofibrate (TRICOR) tablet 145 mg  145 mg Oral Daily    fluticasone (FLONASE) 50 mcg/act nasal spray 1 spray  1 spray Each Nare Daily    folic acid (FOLVITE)  tablet 1 mg  1 mg Oral Daily    guaiFENesin (MUCINEX) 12 hr tablet 1,200 mg  1,200 mg Oral Q12H KENNY    hydrocortisone (ANUSOL-HC) 2.5 % rectal cream   Topical 4x Daily PRN    hydrOXYzine HCL (ATARAX) tablet 100 mg  100 mg Oral Q6H PRN Max 4/day    Or    LORazepam (ATIVAN) injection 2 mg  2 mg Intramuscular Q6H PRN    hydrOXYzine HCL (ATARAX) tablet 25 mg  25 mg Oral Q6H PRN Max 4/day    lactulose (CHRONULAC) oral solution 20 g  20 g Oral Daily PRN    lamoTRIgine (LaMICtal) tablet 100 mg  100 mg Oral BID    lidocaine (LIDODERM) 5 % patch 1 patch  1 patch Topical Daily    LORazepam (ATIVAN) tablet 1 mg  1 mg Oral QPM    meloxicam (MOBIC) tablet 15 mg  15 mg Oral Daily    methocarbamol (ROBAXIN) tablet 500 mg  500 mg Oral BID    multivitamin-minerals (CENTRUM) tablet 1 tablet  1 tablet Oral Daily    nicotine (NICODERM CQ) 21 mg/24 hr TD 24 hr patch 1 patch  1 patch Transdermal Daily    nicotine polacrilex (NICORETTE) gum 4 mg  4 mg Oral Q2H PRN    pantoprazole (PROTONIX) EC tablet 40 mg  40 mg Oral Daily Before Breakfast    polyethylene glycol (MIRALAX) packet 17 g  17 g Oral Daily PRN    risperiDONE (RisperDAL M-TAB) disintegrating tablet 1 mg  1 mg Oral Q6H PRN Max 3/day    risperiDONE (RisperDAL) tablet 2 mg  2 mg Oral BID    senna-docusate sodium (SENOKOT S) 8.6-50 mg per tablet 1 tablet  1 tablet Oral Daily PRN    senna-docusate sodium (SENOKOT S) 8.6-50 mg per tablet 1 tablet  1 tablet Oral BID    simethicone (MYLICON) chewable tablet 80 mg  80 mg Oral 4x Daily (with meals and at bedtime)    thiamine tablet 100 mg  100 mg Oral Daily    traZODone (DESYREL) tablet 50 mg  50 mg Oral HS PRN    venlafaxine (EFFEXOR-XR) 24 hr capsule 150 mg  150 mg Oral Daily   .    Labs: I have personally reviewed all pertinent laboratory/tests results. CBC:   Lab Results   Component Value Date    WBC 10.36 (H) 03/28/2024    RBC 4.77 03/28/2024    HGB 14.0 03/28/2024    HCT 44.7 03/28/2024    MCV 94 03/28/2024     03/28/2024     MCH 29.4 03/28/2024    MCHC 31.3 (L) 03/28/2024    RDW 14.5 03/28/2024    MPV 9.5 03/28/2024    NEUTROABS 6.76 03/28/2024     CMP:   Lab Results   Component Value Date    SODIUM 137 03/23/2024    K 4.3 03/23/2024     03/23/2024    CO2 28 03/23/2024    AGAP 8 03/23/2024    BUN 6 03/23/2024    CREATININE 0.56 (L) 03/23/2024    GLUC 88 03/23/2024    GLUF 88 03/23/2024    CALCIUM 8.9 03/23/2024    AST 13 03/23/2024    ALT 10 03/23/2024    ALKPHOS 76 03/23/2024    TP 6.2 (L) 03/23/2024    ALB 3.8 03/23/2024    TBILI 0.18 (L) 03/23/2024    EGFR 107 03/23/2024     Clozapine:   Lab Results   Component Value Date    CLOZAPINE 649 03/28/2024    NCLOZIP 241 11/29/2023     EKG   Lab Results   Component Value Date    VENTRATE 68 03/23/2024    ATRIALRATE 68 03/23/2024    PRINT 152 03/23/2024    QRSDINT 68 03/23/2024    QTINT 440 03/23/2024    QTCINT 467 03/23/2024    PAXIS 58 03/23/2024    QRSAXIS 3 03/23/2024    TWAVEAXIS 50 03/23/2024       Counseling / Coordination of Care  Total floor / unit time spent today 30 minutes. Greater than 50% of total time was spent with the patient and / or family counseling and / or coordination of care. A description of the counseling / coordination of care: Medication education, treatment plan, safety planning

## 2024-04-01 NOTE — NURSING NOTE
"Patient is alert and verbal in conversation. She reported that PRN medication given earlier in the day \"helped her to feel better\". She is overheard occasionally yelling out in her room. Patient appears preoccupied at times but is able to follow this nurse in conversation and respond appropriately. She denies physical complaints at this time. She denies suicidal or homicidal thoughts. Continues to have auditory hallucinations. She was compliant with HS medications. Q 7 minute safety checks maintained. Will continue with plan of care.   "

## 2024-04-01 NOTE — NURSING NOTE
Patient visible intermittently on the unit throughout the day. Labile at times, noted to be yelling in room occasionally, verbally redirected. Taking medications as prescribed. Attending groups & meals. Denies SI/HI, continues to endorse ongoing AH but denies them being command. Q 7 minute checks maintained.

## 2024-04-02 LAB
C TRACH DNA SPEC QL NAA+PROBE: NEGATIVE
N GONORRHOEA DNA SPEC QL NAA+PROBE: NEGATIVE

## 2024-04-02 PROCEDURE — 99232 SBSQ HOSP IP/OBS MODERATE 35: CPT | Performed by: NURSE PRACTITIONER

## 2024-04-02 RX ORDER — LORAZEPAM 0.5 MG/1
0.5 TABLET ORAL 2 TIMES DAILY
Status: DISCONTINUED | OUTPATIENT
Start: 2024-04-02 | End: 2024-04-10 | Stop reason: HOSPADM

## 2024-04-02 RX ADMIN — BISACODYL 10 MG: 10 SUPPOSITORY RECTAL at 19:37

## 2024-04-02 RX ADMIN — RISPERIDONE 1 MG: 1 TABLET, ORALLY DISINTEGRATING ORAL at 11:52

## 2024-04-02 RX ADMIN — RISPERIDONE 2 MG: 2 TABLET ORAL at 08:20

## 2024-04-02 RX ADMIN — FOLIC ACID 1 MG: 1 TABLET ORAL at 08:20

## 2024-04-02 RX ADMIN — GUAIFENESIN 1200 MG: 600 TABLET ORAL at 19:38

## 2024-04-02 RX ADMIN — Medication 1 TABLET: at 08:20

## 2024-04-02 RX ADMIN — LIDOCAINE 5% 1 PATCH: 700 PATCH TOPICAL at 09:04

## 2024-04-02 RX ADMIN — NICOTINE 1 PATCH: 21 PATCH, EXTENDED RELEASE TRANSDERMAL at 09:03

## 2024-04-02 RX ADMIN — BENZONATATE 100 MG: 100 CAPSULE ORAL at 15:32

## 2024-04-02 RX ADMIN — CLOZAPINE 400 MG: 100 TABLET ORAL at 19:38

## 2024-04-02 RX ADMIN — SIMETHICONE 80 MG: 80 TABLET, CHEWABLE ORAL at 19:37

## 2024-04-02 RX ADMIN — RISPERIDONE 2 MG: 2 TABLET ORAL at 15:32

## 2024-04-02 RX ADMIN — METHOCARBAMOL TABLETS 500 MG: 500 TABLET, COATED ORAL at 17:39

## 2024-04-02 RX ADMIN — THIAMINE HCL TAB 100 MG 100 MG: 100 TAB at 08:20

## 2024-04-02 RX ADMIN — SIMETHICONE 80 MG: 80 TABLET, CHEWABLE ORAL at 15:32

## 2024-04-02 RX ADMIN — SENNOSIDES AND DOCUSATE SODIUM 1 TABLET: 8.6; 5 TABLET ORAL at 17:38

## 2024-04-02 RX ADMIN — METHOCARBAMOL TABLETS 500 MG: 500 TABLET, COATED ORAL at 08:20

## 2024-04-02 RX ADMIN — LORAZEPAM 0.5 MG: 0.5 TABLET ORAL at 15:33

## 2024-04-02 RX ADMIN — FENOFIBRATE 145 MG: 145 TABLET, FILM COATED ORAL at 08:20

## 2024-04-02 RX ADMIN — BENZONATATE 100 MG: 100 CAPSULE ORAL at 08:20

## 2024-04-02 RX ADMIN — FLUTICASONE PROPIONATE 1 SPRAY: 50 SPRAY, METERED NASAL at 08:21

## 2024-04-02 RX ADMIN — VENLAFAXINE HYDROCHLORIDE 150 MG: 150 CAPSULE, EXTENDED RELEASE ORAL at 08:20

## 2024-04-02 RX ADMIN — LAMOTRIGINE 100 MG: 100 TABLET ORAL at 08:20

## 2024-04-02 RX ADMIN — LACTULOSE 20 G: 20 SOLUTION ORAL at 17:47

## 2024-04-02 RX ADMIN — LAMOTRIGINE 100 MG: 100 TABLET ORAL at 17:39

## 2024-04-02 RX ADMIN — PANTOPRAZOLE SODIUM 40 MG: 40 TABLET, DELAYED RELEASE ORAL at 08:20

## 2024-04-02 RX ADMIN — BUDESONIDE AND FORMOTEROL FUMARATE DIHYDRATE 2 PUFF: 160; 4.5 AEROSOL RESPIRATORY (INHALATION) at 08:21

## 2024-04-02 RX ADMIN — HYDROCORTISONE 1 APPLICATION: 25 CREAM TOPICAL at 21:19

## 2024-04-02 RX ADMIN — SENNOSIDES AND DOCUSATE SODIUM 1 TABLET: 8.6; 5 TABLET ORAL at 08:20

## 2024-04-02 RX ADMIN — MELOXICAM 15 MG: 7.5 TABLET ORAL at 08:20

## 2024-04-02 RX ADMIN — SIMETHICONE 80 MG: 80 TABLET, CHEWABLE ORAL at 11:52

## 2024-04-02 RX ADMIN — BUDESONIDE AND FORMOTEROL FUMARATE DIHYDRATE 2 PUFF: 160; 4.5 AEROSOL RESPIRATORY (INHALATION) at 17:38

## 2024-04-02 RX ADMIN — GUAIFENESIN 1200 MG: 600 TABLET ORAL at 08:20

## 2024-04-02 RX ADMIN — CHOLECALCIFEROL TAB 25 MCG (1000 UNIT) 1000 UNITS: 25 TAB at 08:20

## 2024-04-02 RX ADMIN — BENZONATATE 100 MG: 100 CAPSULE ORAL at 19:38

## 2024-04-02 RX ADMIN — SIMETHICONE 80 MG: 80 TABLET, CHEWABLE ORAL at 08:20

## 2024-04-02 NOTE — NURSING NOTE
"Patient visible on the unit, social with peers. Patient continues to c/o ongoing AH, reports taking PRN Risperdal is effective in helping her throughout the day, patient took PRN Risperdal at 1152, patient voiced \"the voices are just everywhere, I can't focus\". Patient appears distracted during conversation. After PRN risperdal, patient able to focus better on conversation & reported feeling better. Patient affect more bright in evening expressing \"every day I get a little bit better\". Taking medications as prescribed. Denies SI/HI. Attending groups & meals in dayroom. Q 7 minute checks maintained.   "

## 2024-04-02 NOTE — SOCIAL WORK
Per Anuja Segundo [email or web address]>, I believe she is approaching baseline, yes. Dandre is coming out to visit with her today and will be able to touch base about how she's doing.

## 2024-04-02 NOTE — PLAN OF CARE
Problem: Risk for Self Injury/Neglect  Goal: Refrain from harming self  Description: Interventions:  - Monitor patient closely, per order  - Develop a trusting relationship  - Supervise medication ingestion, monitor effects and side effects   Outcome: Progressing     Problem: Depression  Goal: Verbalize thoughts and feelings  Description: Interventions:  - Assess and re-assess patient's level of risk   - Engage patient in 1:1 interactions, daily, for a minimum of 15 minutes   - Encourage patient to express feelings, fears, frustrations, hopes   Outcome: Progressing  Goal: Refrain from self-neglect  Outcome: Progressing     Problem: Anxiety  Goal: Anxiety is at manageable level  Description: Interventions:  - Assess and monitor patient's anxiety level.   - Monitor for signs and symptoms (heart palpitations, chest pain, shortness of breath, headaches, nausea, feeling jumpy, restlessness, irritable, apprehensive).   - Collaborate with interdisciplinary team and initiate plan and interventions as ordered.  - [place] patient to unit/surroundings  - Explain treatment plan  - Encourage participation in care  - Encourage verbalization of concerns/fears  - Identify coping mechanisms  - Assist in developing anxiety-reducing skills  - Administer/offer alternative therapies  - Limit or eliminate stimulants  Outcome: Progressing

## 2024-04-02 NOTE — PROGRESS NOTES
04/02/24   Team Meeting   Meeting Type Daily Rounds   Team Members Present   Team Members Present Physician;Nurse;;Occupational Therapist   Physician Team Member Dr Florencio LARSON; Yair COLEMAN    Nursing Team Member Agustin JIMENES   Social Work Team Member Abelardo HOPSON   OT Team Member Gurjit HUITRON   Patient/Family Present   Patient Present No   Patient's Family Present No     Labile, upset over PRN med doses, yelling in room, illogical, meds adjusted-lower effexor, ACT team feels she is close to baseline, ACT visit today, denies AH this AM; no dc date.

## 2024-04-02 NOTE — PROGRESS NOTES
Progress Note - Jo-Ann Lamb 53 y.o. female MRN: [id number]    Unit/Bed#: [facility] 254-01 Encounter: [id number]        Subjective:   Patient seen and examined at bedside after reviewing the chart and discussing the case with the caring staff.      Patient examined at bedside.  Patient has no acute symptoms.     STD labs pending.     Physical Exam   Vitals: Blood pressure 133/84, pulse 82, temperature 97.9 °F (36.6 °C), temperature source Temporal, resp. rate 17, height 5' (1.524 m), weight 64.8 kg (142 lb 12.8 oz), last menstrual period 01/01/2020, SpO2 96%, not currently breastfeeding.,Body mass index is 27.89 kg/m².  Constitutional: Patient in no acute distress.  HEENT: PERR, EOMI, MMM.  Cardiovascular: Normal rate and regular rhythm.    Pulmonary/Chest: Effort normal and breath sounds normal.   Abdomen: Soft, + BS, NT, no rebound, no guarding, no rigidity.     Assessment/Plan:  Jo-Ann Lamb is a(n) 53 y.o. year old female with schizoaffective disorder.     Asthma.  Patient on Symbicort inhaler twice daily and albuterol as needed.   Hypertriglyceridemia.  Patient on Fenofibrate 145 mg daily. Lipid panel 3/23/24: cholesterol 221, triglycerides 211, . Consider statin.  F/up with PCP outpt.   Allergic rhinitis.  Patient on Flonase daily.   GERD. Patient on Protonix 40 mg daily.   Tobacco abuse. NRT.   Alcohol abuse.  Patient started on thiamine 100 mg, folic acid 1 mg, and multivitamin daily.   Chronic back pain.  Patient on Mobic 15 mg daily, Robaxin 500 mg twice daily, and lidocaine patch daily.   Vitamin D deficiency.  Continue daily supplement.   Cough/congestion.  Increase Mucinex to 1200 mg twice daily 3/30.  Tessalon pearls as needed.    Callus of R great toe.  Encouraged pt to f/u with podiatry outpatient.    Constipation/hemorrhoids/flatulence.  On senokot S twice daily.  Miralax and lactulose as needed.  Apply Anusol daily.  Add simethicone 80 mg QID.    Vaginal candidiasis.  Diflucan 150 mg x 1 dose 3/30.     Overactive bladder.  Would avoid ditropan at this time due to possible worsening constipation with Clozaril.  Recommended pelvic floor muscle exercises.  Patient encouraged to follow-up with PCP/gyn on outpatient basis.   Routine STD testing. RPR, HIV, Hep C Ab, Hep B surface Ag all non reactive. HSV and GC/C pending.     The patient was discussed with Dr. Steel and he is in agreement with the above note.
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
## 2024-04-03 PROCEDURE — 99232 SBSQ HOSP IP/OBS MODERATE 35: CPT | Performed by: NURSE PRACTITIONER

## 2024-04-03 RX ADMIN — FOLIC ACID 1 MG: 1 TABLET ORAL at 08:14

## 2024-04-03 RX ADMIN — METHOCARBAMOL TABLETS 500 MG: 500 TABLET, COATED ORAL at 08:14

## 2024-04-03 RX ADMIN — VENLAFAXINE HYDROCHLORIDE 112.5 MG: 75 CAPSULE, EXTENDED RELEASE ORAL at 08:14

## 2024-04-03 RX ADMIN — LIDOCAINE 5% 1 PATCH: 700 PATCH TOPICAL at 10:04

## 2024-04-03 RX ADMIN — FENOFIBRATE 145 MG: 145 TABLET, FILM COATED ORAL at 08:14

## 2024-04-03 RX ADMIN — SIMETHICONE 80 MG: 80 TABLET, CHEWABLE ORAL at 20:47

## 2024-04-03 RX ADMIN — SIMETHICONE 80 MG: 80 TABLET, CHEWABLE ORAL at 12:03

## 2024-04-03 RX ADMIN — BENZONATATE 100 MG: 100 CAPSULE ORAL at 08:14

## 2024-04-03 RX ADMIN — LORAZEPAM 0.5 MG: 0.5 TABLET ORAL at 15:08

## 2024-04-03 RX ADMIN — THIAMINE HCL TAB 100 MG 100 MG: 100 TAB at 08:14

## 2024-04-03 RX ADMIN — BENZONATATE 100 MG: 100 CAPSULE ORAL at 15:08

## 2024-04-03 RX ADMIN — Medication 1 TABLET: at 08:14

## 2024-04-03 RX ADMIN — LAMOTRIGINE 100 MG: 100 TABLET ORAL at 08:14

## 2024-04-03 RX ADMIN — LACTULOSE 20 G: 20 SOLUTION ORAL at 17:19

## 2024-04-03 RX ADMIN — BUDESONIDE AND FORMOTEROL FUMARATE DIHYDRATE 2 PUFF: 160; 4.5 AEROSOL RESPIRATORY (INHALATION) at 08:15

## 2024-04-03 RX ADMIN — HYDROCORTISONE: 25 CREAM TOPICAL at 18:10

## 2024-04-03 RX ADMIN — BUDESONIDE AND FORMOTEROL FUMARATE DIHYDRATE 2 PUFF: 160; 4.5 AEROSOL RESPIRATORY (INHALATION) at 17:16

## 2024-04-03 RX ADMIN — CLOZAPINE 400 MG: 100 TABLET ORAL at 20:46

## 2024-04-03 RX ADMIN — GUAIFENESIN 1200 MG: 600 TABLET ORAL at 20:46

## 2024-04-03 RX ADMIN — LORAZEPAM 0.5 MG: 0.5 TABLET ORAL at 08:15

## 2024-04-03 RX ADMIN — NICOTINE 1 PATCH: 21 PATCH, EXTENDED RELEASE TRANSDERMAL at 10:04

## 2024-04-03 RX ADMIN — LAMOTRIGINE 100 MG: 100 TABLET ORAL at 17:16

## 2024-04-03 RX ADMIN — SIMETHICONE 80 MG: 80 TABLET, CHEWABLE ORAL at 08:14

## 2024-04-03 RX ADMIN — CHOLECALCIFEROL TAB 25 MCG (1000 UNIT) 1000 UNITS: 25 TAB at 08:14

## 2024-04-03 RX ADMIN — MELOXICAM 15 MG: 7.5 TABLET ORAL at 08:14

## 2024-04-03 RX ADMIN — BENZONATATE 100 MG: 100 CAPSULE ORAL at 20:46

## 2024-04-03 RX ADMIN — FLUTICASONE PROPIONATE 1 SPRAY: 50 SPRAY, METERED NASAL at 08:15

## 2024-04-03 RX ADMIN — SENNOSIDES AND DOCUSATE SODIUM 1 TABLET: 8.6; 5 TABLET ORAL at 17:17

## 2024-04-03 RX ADMIN — GUAIFENESIN 1200 MG: 600 TABLET ORAL at 08:14

## 2024-04-03 RX ADMIN — SIMETHICONE 80 MG: 80 TABLET, CHEWABLE ORAL at 15:08

## 2024-04-03 RX ADMIN — PANTOPRAZOLE SODIUM 40 MG: 40 TABLET, DELAYED RELEASE ORAL at 08:14

## 2024-04-03 RX ADMIN — BISACODYL 10 MG: 10 SUPPOSITORY RECTAL at 19:19

## 2024-04-03 RX ADMIN — RISPERIDONE 2 MG: 2 TABLET ORAL at 15:08

## 2024-04-03 RX ADMIN — METHOCARBAMOL TABLETS 500 MG: 500 TABLET, COATED ORAL at 17:16

## 2024-04-03 RX ADMIN — SENNOSIDES AND DOCUSATE SODIUM 1 TABLET: 8.6; 5 TABLET ORAL at 08:15

## 2024-04-03 RX ADMIN — RISPERIDONE 2 MG: 2 TABLET ORAL at 08:15

## 2024-04-03 NOTE — PLAN OF CARE
Problem: Alteration in Thoughts and Perception  Goal: Refrain from acting on delusional thinking/internal stimuli  Description: Interventions:  - Monitor patient closely, per order   - Utilize least restrictive measures   - Set reasonable limits, give positive feedback for acceptable   - Administer medications as ordered and monitor of potential side effects  Outcome: Progressing     Problem: Risk for Self Injury/Neglect  Goal: Refrain from harming self  Description: Interventions:  - Monitor patient closely, per order  - Develop a trusting relationship  - Supervise medication ingestion, monitor effects and side effects   Outcome: Progressing  Goal: Recognize maladaptive responses and adopt new coping mechanisms  Outcome: Progressing     Problem: Depression  Goal: Verbalize thoughts and feelings  Description: Interventions:  - Assess and re-assess patient's level of risk   - Engage patient in 1:1 interactions, daily, for a minimum of 15 minutes   - Encourage patient to express feelings, fears, frustrations, hopes   Outcome: Progressing

## 2024-04-03 NOTE — PLAN OF CARE
Problem: DISCHARGE PLANNING - CARE MANAGEMENT  Goal: Discharge to post-acute care or home with appropriate resources  Description: INTERVENTIONS:  - Conduct assessment to determine patient/family and health care team treatment goals, and need for post-acute services based on payer coverage, community resources, and patient preferences, and barriers to discharge  - Address psychosocial, clinical, and financial barriers to discharge as identified in assessment in conjunction with the patient/family and health care team  - Arrange appropriate level of post-acute services according to patient’s   needs and preference and payer coverage in collaboration with the physician and health care team  - Communicate with and update the patient/family, physician, and health care team regarding progress on the discharge plan  - Arrange appropriate transportation to post-acute venues  Outcome: Progressing     Pt progressing, will dc home with ACT, dc next week possible.

## 2024-04-03 NOTE — PROGRESS NOTES
Progress Note - Jo-Ann Lamb 53 y.o. female MRN: [id number]    Unit/Bed#: [facility] 254-01 Encounter: [id number]        Subjective:   Patient seen and examined at bedside after reviewing the chart and discussing the case with the caring staff.      Patient examined at bedside.  Patient has no acute symptoms. She would like to go over lab results.     Physical Exam   Vitals: Blood pressure 113/76, pulse 85, temperature 97.7 °F (36.5 °C), temperature source Temporal, resp. rate 16, height 5' (1.524 m), weight 64.8 kg (142 lb 12.8 oz), last menstrual period 01/01/2020, SpO2 94%, not currently breastfeeding.,Body mass index is 27.89 kg/m².  Constitutional: Patient in no acute distress.  HEENT: PERR, EOMI, MMM.  Cardiovascular: Normal rate and regular rhythm.    Pulmonary/Chest: Effort normal and breath sounds normal.   Abdomen: Soft, + BS, NT, no rebound, no guarding, no rigidity.     Assessment/Plan:  Jo-Ann Lamb is a(n) 53 y.o. year old female with schizoaffective disorder.     Asthma.  Patient on Symbicort inhaler twice daily and albuterol as needed.   Hypertriglyceridemia.  Patient on Fenofibrate 145 mg daily. Lipid panel 3/23/24: cholesterol 221, triglycerides 211, . Patient declines statin at this time and would like to f/up with PCP outpt.   Allergic rhinitis.  Patient on Flonase daily.   GERD. Patient on Protonix 40 mg daily.   Tobacco abuse. NRT.   Alcohol abuse.  Patient started on thiamine 100 mg, folic acid 1 mg, and multivitamin daily.   Chronic back pain.  Patient on Mobic 15 mg daily, Robaxin 500 mg twice daily, and lidocaine patch daily.   Vitamin D deficiency.  Continue daily supplement.   Cough/congestion.  Increase Mucinex to 1200 mg twice daily 3/30.  Tessalon pearls as needed.    Callus of R great toe.  Encouraged pt to f/u with podiatry outpatient.    Constipation/hemorrhoids/flatulence.  On senokot S twice daily.  Miralax and lactulose as needed.  Apply Anusol daily.  Add simethicone 80 mg QID.     Vaginal candidiasis.  Diflucan 150 mg x 1 dose 3/30.    Overactive bladder.  Would avoid ditropan at this time due to possible worsening constipation with Clozaril.  Recommended pelvic floor muscle exercises.  Patient encouraged to follow-up with PCP/gyn on outpatient basis.   Routine STD testing. RPR, HIV, Hep C Ab, Hep B surface Ag, Chlamydia/GC all negative. HSV pending.     The patient was discussed with Dr. Steel and he is in agreement with the above note.

## 2024-04-03 NOTE — PROGRESS NOTES
"Progress Note - Behavioral Health   Jo-Ann Lamb 53 y.o. female MRN: [id number]  Unit/Bed#: [facility] 254-01 Encounter: [id number]    Assessment/Plan   Principal Problem:    Schizoaffective disorder, bipolar type (HCC)      Behavior over the last 24 hours: Some improvement  Sleep: normal  Appetite: normal  Medication side effects: No  ROS: no complaints and all other systems are negative    Jo-Ann has been visible and participatory in milieu activities.  Mood less labile.  Denies AVH this morning and states \"they're not around now; sometimes it gets worse in the afternoon.\"  No PRNs utilized thus far today which is an improvement.  Continues to endorse paranoid and illogical thinking regarding delusional content. ACT visiting patient regularly.  Has been compliant with medications and meals.  Maintaining ADLs and denies any sleep disturbance.  No return of SI since exacerbation of AH 2 nights ago.    Mental Status Evaluation:  Appearance:  age appropriate, casually dressed, and blonde hair in bun   Behavior:  Cooperative   Speech:  normal pitch and normal volume   Mood:  \"Good\"   Affect:  constricted and mood-congruent   Thought Process:  circumstantial   Associations: circumstantial associations   Thought Content:  Bizarre and paranoid delusions   Perceptual Disturbances: Denies current AVH , appeared less preoccupied   Risk Potential: Suicidal Ideations none  Homicidal Ideations none  Potential for Aggression No   Sensorium:  person, place, time/date, and situation   Memory:  recent and remote memory grossly intact   Consciousness:  alert and awake    Attention: attention span appeared shorter than expected for age   Insight:  limited   Judgment: limited   Gait/Station: normal gait/station and normal balance   Motor Activity: no abnormal movements     Progress Toward Goals: Some improvement.  Mood less labile and appears less preoccupied today.  Illogical and paranoid thoughts persist appear to be chronic in nature.  " Showing some improvement with reality testing.  Clozaril level due tomorrow with weekly CBCD.  Will continue with current psychotropic regimen; patient tolerating well.  Utilizing 2 antipsychotics at this time for augmentation of Clozaril therapy and history of multiple failed monotherapy trials.  No discharge date at this time.    Recommended Treatment: Continue with group therapy, milieu therapy and occupational therapy.      Risks, benefits and possible side effects of Medications:   Jo-Ann has limited understanding of risk versus benefits of medications, but agrees to take as prescribed.    Medications: all current active meds have been reviewed, continue current psychiatric medications, and current meds:   Current Facility-Administered Medications   Medication Dose Route Frequency    acetaminophen (TYLENOL) tablet 650 mg  650 mg Oral Q6H PRN    acetaminophen (TYLENOL) tablet 650 mg  650 mg Oral Q4H PRN    acetaminophen (TYLENOL) tablet 975 mg  975 mg Oral Q6H PRN    albuterol (PROVENTIL HFA,VENTOLIN HFA) inhaler 2 puff  2 puff Inhalation Q4H PRN    aluminum-magnesium hydroxide-simethicone (MAALOX) oral suspension 30 mL  30 mL Oral Q4H PRN    benzonatate (TESSALON PERLES) capsule 100 mg  100 mg Oral TID    benztropine (COGENTIN) tablet 1 mg  1 mg Oral Q4H PRN Max 6/day    bisacodyl (DULCOLAX) rectal suppository 10 mg  10 mg Rectal Daily PRN    budesonide-formoterol (SYMBICORT) 160-4.5 mcg/act inhaler 2 puff  2 puff Inhalation BID    Cholecalciferol (VITAMIN D3) tablet 1,000 Units  1,000 Units Oral QAM    cloZAPine (CLOZARIL) tablet 400 mg  400 mg Oral HS    hydrOXYzine HCL (ATARAX) tablet 50 mg  50 mg Oral Q6H PRN Max 4/day    Or    diphenhydrAMINE (BENADRYL) injection 50 mg  50 mg Intramuscular Q6H PRN    fenofibrate (TRICOR) tablet 145 mg  145 mg Oral Daily    fluticasone (FLONASE) 50 mcg/act nasal spray 1 spray  1 spray Each Nare Daily    folic acid (FOLVITE) tablet 1 mg  1 mg Oral Daily    guaiFENesin  (MUCINEX) 12 hr tablet 1,200 mg  1,200 mg Oral Q12H KENNY    hydrocortisone (ANUSOL-HC) 2.5 % rectal cream   Topical 4x Daily PRN    hydrOXYzine HCL (ATARAX) tablet 100 mg  100 mg Oral Q6H PRN Max 4/day    Or    LORazepam (ATIVAN) injection 1 mg  1 mg Intramuscular Q6H PRN    hydrOXYzine HCL (ATARAX) tablet 25 mg  25 mg Oral Q6H PRN Max 4/day    lactulose (CHRONULAC) oral solution 20 g  20 g Oral Daily PRN    lamoTRIgine (LaMICtal) tablet 100 mg  100 mg Oral BID    lidocaine (LIDODERM) 5 % patch 1 patch  1 patch Topical Daily    LORazepam (ATIVAN) tablet 0.5 mg  0.5 mg Oral BID    meloxicam (MOBIC) tablet 15 mg  15 mg Oral Daily    methocarbamol (ROBAXIN) tablet 500 mg  500 mg Oral BID    multivitamin-minerals (CENTRUM) tablet 1 tablet  1 tablet Oral Daily    nicotine (NICODERM CQ) 21 mg/24 hr TD 24 hr patch 1 patch  1 patch Transdermal Daily    nicotine polacrilex (NICORETTE) gum 4 mg  4 mg Oral Q2H PRN    pantoprazole (PROTONIX) EC tablet 40 mg  40 mg Oral Daily Before Breakfast    polyethylene glycol (MIRALAX) packet 17 g  17 g Oral Daily PRN    risperiDONE (RisperDAL M-TAB) disintegrating tablet 1 mg  1 mg Oral Q6H PRN Max 3/day    risperiDONE (RisperDAL) tablet 2 mg  2 mg Oral BID    senna-docusate sodium (SENOKOT S) 8.6-50 mg per tablet 1 tablet  1 tablet Oral Daily PRN    senna-docusate sodium (SENOKOT S) 8.6-50 mg per tablet 1 tablet  1 tablet Oral BID    simethicone (MYLICON) chewable tablet 80 mg  80 mg Oral 4x Daily (with meals and at bedtime)    thiamine tablet 100 mg  100 mg Oral Daily    traZODone (DESYREL) tablet 50 mg  50 mg Oral HS PRN    venlafaxine (EFFEXOR-XR) 24 hr capsule 112.5 mg  112.5 mg Oral Daily   .    Labs: I have personally reviewed all pertinent laboratory/tests results. CBC:   Lab Results   Component Value Date    WBC 10.36 (H) 03/28/2024    RBC 4.77 03/28/2024    HGB 14.0 03/28/2024    HCT 44.7 03/28/2024    MCV 94 03/28/2024     03/28/2024    MCH 29.4 03/28/2024    MCHC 31.3  (L) 03/28/2024    RDW 14.5 03/28/2024    MPV 9.5 03/28/2024    NEUTROABS 6.76 03/28/2024     CMP:   Lab Results   Component Value Date    SODIUM 137 03/23/2024    K 4.3 03/23/2024     03/23/2024    CO2 28 03/23/2024    AGAP 8 03/23/2024    BUN 6 03/23/2024    CREATININE 0.56 (L) 03/23/2024    GLUC 88 03/23/2024    GLUF 88 03/23/2024    CALCIUM 8.9 03/23/2024    AST 13 03/23/2024    ALT 10 03/23/2024    ALKPHOS 76 03/23/2024    TP 6.2 (L) 03/23/2024    ALB 3.8 03/23/2024    TBILI 0.18 (L) 03/23/2024    EGFR 107 03/23/2024     Clozapine:   Lab Results   Component Value Date    CLOZAPINE 649 03/28/2024    NCLOZIP 241 11/29/2023     EKG   Lab Results   Component Value Date    VENTRATE 68 03/23/2024    ATRIALRATE 68 03/23/2024    PRINT 152 03/23/2024    QRSDINT 68 03/23/2024    QTINT 440 03/23/2024    QTCINT 467 03/23/2024    PAXIS 58 03/23/2024    QRSAXIS 3 03/23/2024    TWAVEAXIS 50 03/23/2024       Counseling / Coordination of Care  Total floor / unit time spent today 30 minutes. Greater than 50% of total time was spent with the patient and / or family counseling and / or coordination of care. A description of the counseling / coordination of care: Medication education, treatment plan, safety planning

## 2024-04-03 NOTE — NURSING NOTE
Patient is alert and verbal in conversation. Continues to have auditory hallucinations. She is overheard occasionally yelling out in her room. She is more linear in conversation and respond appropriately to questions. She denies suicidal or homicidal thoughts. She requested suppository this evening in which she requested to administer herself. She was compliant with HS medications, requested early. Q 7 minute safety checks maintained. Will continue with plan of care.

## 2024-04-03 NOTE — NURSING NOTE
Patient visible on the unit throughout the day, social with peers. Pleasant & cooperative with staff. Denies SI/HI, reports ongoing AH no PRN's needed. Taking medications as prescribed. Last bowel movement yesterday, requested Lactuose at 1719. Attended groups & meals throughout the day. Q 7 minute checks maintained.

## 2024-04-03 NOTE — PROGRESS NOTES
04/03/24   Team Meeting   Meeting Type Daily Rounds   Team Members Present   Team Members Present Physician;Nurse;Occupational Therapist;   Physician Team Member Dr Tatiana LARSON; Yair COLEMAN   Nursing Team Member Agustin JIMENES   Social Work Team Member Abelardo HOPSON   OT Team Member Gurjit HUITRON   Patient/Family Present   Patient Present No   Patient's Family Present No     PRN risperdol for AH, distressed, more bright in afternoon, more visible, effexor decreased, ativan decreased, clozaril and CBC tomorrow; dc next week.

## 2024-04-04 LAB
BASOPHILS # BLD AUTO: 0.06 THOUSANDS/ÂΜL (ref 0–0.1)
BASOPHILS NFR BLD AUTO: 1 % (ref 0–1)
CLOZAPINE SERPL-MCNC: 867 NG/ML (ref 350–900)
EOSINOPHIL # BLD AUTO: 0.4 THOUSAND/ÂΜL (ref 0–0.61)
EOSINOPHIL NFR BLD AUTO: 5 % (ref 0–6)
ERYTHROCYTE [DISTWIDTH] IN BLOOD BY AUTOMATED COUNT: 14 % (ref 11.6–15.1)
HCT VFR BLD AUTO: 44.4 % (ref 34.8–46.1)
HGB BLD-MCNC: 14.1 G/DL (ref 11.5–15.4)
IMM GRANULOCYTES # BLD AUTO: 0.04 THOUSAND/UL (ref 0–0.2)
IMM GRANULOCYTES NFR BLD AUTO: 0 % (ref 0–2)
LYMPHOCYTES # BLD AUTO: 2.18 THOUSANDS/ÂΜL (ref 0.6–4.47)
LYMPHOCYTES NFR BLD AUTO: 25 % (ref 14–44)
MCH RBC QN AUTO: 29.4 PG (ref 26.8–34.3)
MCHC RBC AUTO-ENTMCNC: 31.8 G/DL (ref 31.4–37.4)
MCV RBC AUTO: 93 FL (ref 82–98)
MONOCYTES # BLD AUTO: 0.68 THOUSAND/ÂΜL (ref 0.17–1.22)
MONOCYTES NFR BLD AUTO: 8 % (ref 4–12)
NEUTROPHILS # BLD AUTO: 5.54 THOUSANDS/ÂΜL (ref 1.85–7.62)
NEUTS SEG NFR BLD AUTO: 61 % (ref 43–75)
NRBC BLD AUTO-RTO: 0 /100 WBCS
PLATELET # BLD AUTO: 323 THOUSANDS/UL (ref 149–390)
PMV BLD AUTO: 9.9 FL (ref 8.9–12.7)
RBC # BLD AUTO: 4.8 MILLION/UL (ref 3.81–5.12)
WBC # BLD AUTO: 8.9 THOUSAND/UL (ref 4.31–10.16)

## 2024-04-04 PROCEDURE — 99232 SBSQ HOSP IP/OBS MODERATE 35: CPT | Performed by: STUDENT IN AN ORGANIZED HEALTH CARE EDUCATION/TRAINING PROGRAM

## 2024-04-04 PROCEDURE — 85025 COMPLETE CBC W/AUTO DIFF WBC: CPT | Performed by: NURSE PRACTITIONER

## 2024-04-04 PROCEDURE — 80159 DRUG ASSAY CLOZAPINE: CPT | Performed by: NURSE PRACTITIONER

## 2024-04-04 RX ORDER — LORAZEPAM 0.5 MG/1
0.5 TABLET ORAL ONCE
Status: COMPLETED | OUTPATIENT
Start: 2024-04-04 | End: 2024-04-04

## 2024-04-04 RX ADMIN — LORAZEPAM 0.5 MG: 0.5 TABLET ORAL at 15:19

## 2024-04-04 RX ADMIN — LORAZEPAM 0.5 MG: 0.5 TABLET ORAL at 08:37

## 2024-04-04 RX ADMIN — LAMOTRIGINE 100 MG: 100 TABLET ORAL at 17:26

## 2024-04-04 RX ADMIN — FENOFIBRATE 145 MG: 145 TABLET, FILM COATED ORAL at 08:37

## 2024-04-04 RX ADMIN — Medication 1 TABLET: at 08:37

## 2024-04-04 RX ADMIN — BUDESONIDE AND FORMOTEROL FUMARATE DIHYDRATE 2 PUFF: 160; 4.5 AEROSOL RESPIRATORY (INHALATION) at 08:41

## 2024-04-04 RX ADMIN — GUAIFENESIN 1200 MG: 600 TABLET ORAL at 20:46

## 2024-04-04 RX ADMIN — PANTOPRAZOLE SODIUM 40 MG: 40 TABLET, DELAYED RELEASE ORAL at 08:37

## 2024-04-04 RX ADMIN — RISPERIDONE 2 MG: 2 TABLET ORAL at 08:37

## 2024-04-04 RX ADMIN — THIAMINE HCL TAB 100 MG 100 MG: 100 TAB at 08:37

## 2024-04-04 RX ADMIN — SENNOSIDES AND DOCUSATE SODIUM 1 TABLET: 8.6; 5 TABLET ORAL at 17:26

## 2024-04-04 RX ADMIN — GUAIFENESIN 1200 MG: 600 TABLET ORAL at 08:37

## 2024-04-04 RX ADMIN — SENNOSIDES AND DOCUSATE SODIUM 1 TABLET: 8.6; 5 TABLET ORAL at 08:37

## 2024-04-04 RX ADMIN — LAMOTRIGINE 100 MG: 100 TABLET ORAL at 08:37

## 2024-04-04 RX ADMIN — FOLIC ACID 1 MG: 1 TABLET ORAL at 08:37

## 2024-04-04 RX ADMIN — METHOCARBAMOL TABLETS 500 MG: 500 TABLET, COATED ORAL at 08:37

## 2024-04-04 RX ADMIN — RISPERIDONE 2 MG: 2 TABLET ORAL at 15:18

## 2024-04-04 RX ADMIN — BENZONATATE 100 MG: 100 CAPSULE ORAL at 08:37

## 2024-04-04 RX ADMIN — BUDESONIDE AND FORMOTEROL FUMARATE DIHYDRATE 2 PUFF: 160; 4.5 AEROSOL RESPIRATORY (INHALATION) at 17:28

## 2024-04-04 RX ADMIN — VENLAFAXINE HYDROCHLORIDE 112.5 MG: 75 CAPSULE, EXTENDED RELEASE ORAL at 08:37

## 2024-04-04 RX ADMIN — BENZONATATE 100 MG: 100 CAPSULE ORAL at 15:19

## 2024-04-04 RX ADMIN — METHOCARBAMOL TABLETS 500 MG: 500 TABLET, COATED ORAL at 17:26

## 2024-04-04 RX ADMIN — SIMETHICONE 80 MG: 80 TABLET, CHEWABLE ORAL at 20:46

## 2024-04-04 RX ADMIN — LACTULOSE 20 G: 20 SOLUTION ORAL at 13:22

## 2024-04-04 RX ADMIN — SIMETHICONE 80 MG: 80 TABLET, CHEWABLE ORAL at 12:56

## 2024-04-04 RX ADMIN — CHOLECALCIFEROL TAB 25 MCG (1000 UNIT) 1000 UNITS: 25 TAB at 08:37

## 2024-04-04 RX ADMIN — LORAZEPAM 0.5 MG: 0.5 TABLET ORAL at 12:56

## 2024-04-04 RX ADMIN — HYDROCORTISONE: 25 CREAM TOPICAL at 17:28

## 2024-04-04 RX ADMIN — MELOXICAM 15 MG: 7.5 TABLET ORAL at 08:37

## 2024-04-04 RX ADMIN — CLOZAPINE 400 MG: 100 TABLET ORAL at 20:46

## 2024-04-04 RX ADMIN — BENZONATATE 100 MG: 100 CAPSULE ORAL at 20:46

## 2024-04-04 RX ADMIN — LIDOCAINE 5% 1 PATCH: 700 PATCH TOPICAL at 08:36

## 2024-04-04 RX ADMIN — SIMETHICONE 80 MG: 80 TABLET, CHEWABLE ORAL at 17:31

## 2024-04-04 RX ADMIN — NICOTINE 1 PATCH: 21 PATCH, EXTENDED RELEASE TRANSDERMAL at 08:37

## 2024-04-04 RX ADMIN — SIMETHICONE 80 MG: 80 TABLET, CHEWABLE ORAL at 08:37

## 2024-04-04 RX ADMIN — FLUTICASONE PROPIONATE 1 SPRAY: 50 SPRAY, METERED NASAL at 08:41

## 2024-04-04 RX ADMIN — RISPERIDONE 1 MG: 1 TABLET, ORALLY DISINTEGRATING ORAL at 11:30

## 2024-04-04 NOTE — NURSING NOTE
Patient is alert and verbal in conversation. Continues to have auditory hallucinations. She is overheard occasionally yelling out in her room. No irritability is noted this evening.  Affect constricted. She denies suicidal or homicidal thoughts. She requested suppository this evening in which she requested to administer herself. States she last had a bowel movement yesterday but feels constipated. She was compliant with HS medications. Q 7 minute safety checks maintained. Will continue with plan of care.

## 2024-04-04 NOTE — PROGRESS NOTES
04/04/24   Team Meeting   Meeting Type Daily Rounds   Team Members Present   Team Members Present Physician;Nurse;Occupational Therapist;   Physician Team Member Dr Tatiana LARSON; Yair COLEMAN   Nursing Team Member Evelia JIMENES   Social Work Team Member Abelardo HOPSON   OT Team Member Gurjit HUITRON   Patient/Family Present   Patient Present No   Patient's Family Present No     AH endorsed, less labile, yelling out in room, less irritable, last BM 4/2, CBC/clozaril level today, cancel OP ultrasound, no PRNs yest, meds adjusted; dc next week

## 2024-04-04 NOTE — NURSING NOTE
Patient states prior med given was minimally effective provider ordered one time dose ativan 0.5mg will monitor for effectiveness.

## 2024-04-04 NOTE — NURSING NOTE
Patient compliant with meds and meals. Patient cont to complain of AH. Patient is pleasant and cooperative. Blunted affect minimally social with select peers. Patient attends groups. Moderate depression and anxiety. Q 7 min behavioral and safety checks in place.

## 2024-04-04 NOTE — PROGRESS NOTES
"Progress Note - Behavioral Health   Jo-Ann Lamb 53 y.o. female MRN: [id number]  Unit/Bed#: [facility] 254-01 Encounter: [id number]    Assessment/Plan   Principal Problem:    Schizoaffective disorder, bipolar type (HCC)      Behavior over the last 24 hours:  unchanged  Sleep: normal  Appetite: normal  Medication side effects: No  ROS: no complaints and all other systems are negative    Jo-Ann reports worsening intensity and frequency of AH today.  Wallace actively responding to internal stimuli in room stating, \"I don't want your two cents! Leave me alone! Why are you still talking?\"  Received PRN Risperdal; partially effective.  Came back to provider's office with increased anxiety and given one time dose of lorazepam 0.5 mg.  States, \"I can't tell if the voices are real or not.\"  Spoke about AH named \"Clarita\" who lives in [place] telling her to do things of \"perversion\" like \"making me stand against the wall and take off my clothes.\"  She also believes her exes, Dion and Daron, are after her for her fame and fortune.  Paranoia persists regarding hypnosis.  Appeared preoccupied during encounter.  Having difficulty reality testing.    Mental Status Evaluation:  Appearance:  age appropriate, casually dressed, and blonde hair   Behavior:  Cooperative, distracted, redirectable   Speech:  normal pitch and normal volume   Mood:  anxious and dysthymic   Affect:  mood-congruent   Thought Process:  illogical   Associations: perseverative   Thought Content:  Bizarre and paranoid delusions   Perceptual Disturbances: Ongoing AH that are intrusive and commanding, described as \" 10 different voices\" and an \"intercom system\"   Risk Potential: Suicidal Ideations none  Homicidal Ideations none  Potential for Aggression No   Sensorium:  person, place, and time/date   Memory:  recent and remote memory grossly intact   Consciousness:  alert and awake    Attention: attention span appeared shorter than expected for age   Insight:  limited   Judgment: " limited   Gait/Station: normal gait/station and normal balance   Motor Activity: no abnormal movements     Progress Toward Goals: No improvement.  AVH intensity and frequency increased this afternoon.  Bizarre and paranoid delusions persist regarding hypnosis.  Clozaril level pending.  Plan is to further titrate Clozaril for treatment of ongoing psychosis; consider adding daytime dose of Clozaril for management of psychosis.  Utilizing 2 antipsychotics at this time for augmentation of Clozaril therapy and history of multiple failed monotherapy trials.  Plan is to also discuss progress and treatment plan with ACT.  No discharge date at this time.    Recommended Treatment: Continue with group therapy, milieu therapy and occupational therapy.      Risks, benefits and possible side effects of Medications:   Jo-Ann has limited understanding of risk versus benefits of medications, but agrees to take as prescribed.    Medications: all current active meds have been reviewed, continue current psychiatric medications, and current meds:   Current Facility-Administered Medications   Medication Dose Route Frequency    acetaminophen (TYLENOL) tablet 650 mg  650 mg Oral Q6H PRN    acetaminophen (TYLENOL) tablet 650 mg  650 mg Oral Q4H PRN    acetaminophen (TYLENOL) tablet 975 mg  975 mg Oral Q6H PRN    albuterol (PROVENTIL HFA,VENTOLIN HFA) inhaler 2 puff  2 puff Inhalation Q4H PRN    aluminum-magnesium hydroxide-simethicone (MAALOX) oral suspension 30 mL  30 mL Oral Q4H PRN    benzonatate (TESSALON PERLES) capsule 100 mg  100 mg Oral TID    benztropine (COGENTIN) tablet 1 mg  1 mg Oral Q4H PRN Max 6/day    bisacodyl (DULCOLAX) rectal suppository 10 mg  10 mg Rectal Daily PRN    budesonide-formoterol (SYMBICORT) 160-4.5 mcg/act inhaler 2 puff  2 puff Inhalation BID    Cholecalciferol (VITAMIN D3) tablet 1,000 Units  1,000 Units Oral QAM    cloZAPine (CLOZARIL) tablet 400 mg  400 mg Oral HS    hydrOXYzine HCL (ATARAX) tablet 50 mg   50 mg Oral Q6H PRN Max 4/day    Or    diphenhydrAMINE (BENADRYL) injection 50 mg  50 mg Intramuscular Q6H PRN    fenofibrate (TRICOR) tablet 145 mg  145 mg Oral Daily    fluticasone (FLONASE) 50 mcg/act nasal spray 1 spray  1 spray Each Nare Daily    folic acid (FOLVITE) tablet 1 mg  1 mg Oral Daily    guaiFENesin (MUCINEX) 12 hr tablet 1,200 mg  1,200 mg Oral Q12H KENNY    hydrocortisone (ANUSOL-HC) 2.5 % rectal cream   Topical 4x Daily PRN    hydrOXYzine HCL (ATARAX) tablet 100 mg  100 mg Oral Q6H PRN Max 4/day    Or    LORazepam (ATIVAN) injection 1 mg  1 mg Intramuscular Q6H PRN    hydrOXYzine HCL (ATARAX) tablet 25 mg  25 mg Oral Q6H PRN Max 4/day    lactulose (CHRONULAC) oral solution 20 g  20 g Oral Daily PRN    lamoTRIgine (LaMICtal) tablet 100 mg  100 mg Oral BID    lidocaine (LIDODERM) 5 % patch 1 patch  1 patch Topical Daily    LORazepam (ATIVAN) tablet 0.5 mg  0.5 mg Oral BID    meloxicam (MOBIC) tablet 15 mg  15 mg Oral Daily    methocarbamol (ROBAXIN) tablet 500 mg  500 mg Oral BID    multivitamin-minerals (CENTRUM) tablet 1 tablet  1 tablet Oral Daily    nicotine (NICODERM CQ) 21 mg/24 hr TD 24 hr patch 1 patch  1 patch Transdermal Daily    nicotine polacrilex (NICORETTE) gum 4 mg  4 mg Oral Q2H PRN    pantoprazole (PROTONIX) EC tablet 40 mg  40 mg Oral Daily Before Breakfast    polyethylene glycol (MIRALAX) packet 17 g  17 g Oral Daily PRN    risperiDONE (RisperDAL M-TAB) disintegrating tablet 1 mg  1 mg Oral Q6H PRN Max 3/day    risperiDONE (RisperDAL) tablet 2 mg  2 mg Oral BID    senna-docusate sodium (SENOKOT S) 8.6-50 mg per tablet 1 tablet  1 tablet Oral Daily PRN    senna-docusate sodium (SENOKOT S) 8.6-50 mg per tablet 1 tablet  1 tablet Oral BID    simethicone (MYLICON) chewable tablet 80 mg  80 mg Oral 4x Daily (with meals and at bedtime)    thiamine tablet 100 mg  100 mg Oral Daily    traZODone (DESYREL) tablet 50 mg  50 mg Oral HS PRN    venlafaxine (EFFEXOR-XR) 24 hr capsule 112.5 mg   112.5 mg Oral Daily   .    Labs: I have personally reviewed all pertinent laboratory/tests results. CBC:   Lab Results   Component Value Date    WBC 8.90 04/04/2024    RBC 4.80 04/04/2024    HGB 14.1 04/04/2024    HCT 44.4 04/04/2024    MCV 93 04/04/2024     04/04/2024    MCH 29.4 04/04/2024    MCHC 31.8 04/04/2024    RDW 14.0 04/04/2024    MPV 9.9 04/04/2024    NEUTROABS 5.54 04/04/2024     CMP:   Lab Results   Component Value Date    SODIUM 137 03/23/2024    K 4.3 03/23/2024     03/23/2024    CO2 28 03/23/2024    AGAP 8 03/23/2024    BUN 6 03/23/2024    CREATININE 0.56 (L) 03/23/2024    GLUC 88 03/23/2024    GLUF 88 03/23/2024    CALCIUM 8.9 03/23/2024    AST 13 03/23/2024    ALT 10 03/23/2024    ALKPHOS 76 03/23/2024    TP 6.2 (L) 03/23/2024    ALB 3.8 03/23/2024    TBILI 0.18 (L) 03/23/2024    EGFR 107 03/23/2024     Clozapine:   Lab Results   Component Value Date    CLOZAPINE 649 03/28/2024    NCLOZIP 241 11/29/2023     Drug Screen:   Lab Results   Component Value Date    AMPMETHUR Negative 03/22/2024    BARBTUR Negative 03/22/2024    BDZUR Negative 03/22/2024    THCUR Negative 03/22/2024    COCAINEUR Negative 03/22/2024    METHADONEUR Negative 03/22/2024    OPIATEUR Negative 03/22/2024    PCPUR Negative 03/22/2024     EKG   Lab Results   Component Value Date    VENTRATE 68 03/23/2024    ATRIALRATE 68 03/23/2024    PRINT 152 03/23/2024    QRSDINT 68 03/23/2024    QTINT 440 03/23/2024    QTCINT 467 03/23/2024    PAXIS 58 03/23/2024    QRSAXIS 3 03/23/2024    TWAVEAXIS 50 03/23/2024       Counseling / Coordination of Care  Total floor / unit time spent today 40 minutes. Greater than 50% of total time was spent with the patient and / or family counseling and / or coordination of care. A description of the counseling / coordination of care: Medication education, treatment plan, safety planning

## 2024-04-04 NOTE — NURSING NOTE
"Patient stated she is upset at the \"hyponotis\" states they are causing her to eat too much food and feels that this is effecting her negatively. Educated patient that she can order her own food and she can choose how much she wants and needs. Administered 1mg risp PRN will monitor for effectiveness   "

## 2024-04-04 NOTE — SOCIAL WORK
Cipriano Newton <[email or web address]> to visit pt in person tomorrow 1215pm.     Pt requesting permission to use phone for account checking, NP notified of request.

## 2024-04-04 NOTE — PROGRESS NOTES
Progress Note - Jo-Ann Lamb 53 y.o. female MRN: [id number]    Unit/Bed#: [facility] 254-01 Encounter: [id number]        Subjective:   Patient seen and examined at bedside after reviewing the chart and discussing the case with the caring staff.      Patient examined at bedside.  Patient has no acute symptoms.     Patient is a possible discharge next week.     Physical Exam   Vitals: Blood pressure 119/72, pulse 90, temperature 97.5 °F (36.4 °C), temperature source Temporal, resp. rate 17, height 5' (1.524 m), weight 64.8 kg (142 lb 12.8 oz), last menstrual period 01/01/2020, SpO2 93%, not currently breastfeeding.,Body mass index is 27.89 kg/m².  Constitutional: Patient in no acute distress.  HEENT: PERR, EOMI, MMM.  Cardiovascular: Normal rate and regular rhythm.    Pulmonary/Chest: Effort normal and breath sounds normal.   Abdomen: Soft, + BS, NT, no rebound, no guarding, no rigidity.     Assessment/Plan:  Jo-Ann Lamb is a(n) 53 y.o. year old female with schizoaffective disorder.     Asthma.  Patient on Symbicort inhaler twice daily and albuterol as needed.   Hypertriglyceridemia.  Patient on Fenofibrate 145 mg daily. Lipid panel 3/23/24: cholesterol 221, triglycerides 211, . Patient declines statin at this time and would like to f/up with PCP outpt.   Allergic rhinitis.  Patient on Flonase daily.   GERD. Patient on Protonix 40 mg daily.   Tobacco abuse. NRT.   Alcohol abuse.  Patient started on thiamine 100 mg, folic acid 1 mg, and multivitamin daily.   Chronic back pain.  Patient on Mobic 15 mg daily, Robaxin 500 mg twice daily, and lidocaine patch daily.   Vitamin D deficiency.  Continue daily supplement.   Cough/congestion.  Increase Mucinex to 1200 mg twice daily 3/30.  Tessalon pearls as needed.    Callus of R great toe.  Encouraged pt to f/u with podiatry outpatient.    Constipation/hemorrhoids/flatulence.  On senokot S twice daily.  Miralax and lactulose as needed.  Apply Anusol daily.  Add simethicone 80  mg QID.    Vaginal candidiasis.  Diflucan 150 mg x 1 dose 3/30.    Overactive bladder.  Would avoid ditropan at this time due to possible worsening constipation with Clozaril.  Recommended pelvic floor muscle exercises.  Patient encouraged to follow-up with PCP/gyn on outpatient basis.   Routine STD testing. RPR, HIV, Hep C Ab, Hep B surface Ag, Chlamydia/GC all negative. HSV pending.    The patient was discussed with Dr. Steel and he is in agreement with the above note.

## 2024-04-05 PROCEDURE — 99232 SBSQ HOSP IP/OBS MODERATE 35: CPT | Performed by: NURSE PRACTITIONER

## 2024-04-05 RX ORDER — METHOCARBAMOL 500 MG/1
750 TABLET, FILM COATED ORAL 2 TIMES DAILY
Status: DISCONTINUED | OUTPATIENT
Start: 2024-04-05 | End: 2024-04-10 | Stop reason: HOSPADM

## 2024-04-05 RX ORDER — CLOZAPINE 100 MG/1
350 TABLET ORAL
Status: DISCONTINUED | OUTPATIENT
Start: 2024-04-05 | End: 2024-04-10 | Stop reason: HOSPADM

## 2024-04-05 RX ORDER — CLOZAPINE 25 MG/1
50 TABLET ORAL DAILY
Status: DISCONTINUED | OUTPATIENT
Start: 2024-04-05 | End: 2024-04-10 | Stop reason: HOSPADM

## 2024-04-05 RX ADMIN — THIAMINE HCL TAB 100 MG 100 MG: 100 TAB at 08:39

## 2024-04-05 RX ADMIN — FENOFIBRATE 145 MG: 145 TABLET, FILM COATED ORAL at 08:39

## 2024-04-05 RX ADMIN — BENZONATATE 100 MG: 100 CAPSULE ORAL at 15:02

## 2024-04-05 RX ADMIN — RISPERIDONE 2 MG: 2 TABLET ORAL at 15:02

## 2024-04-05 RX ADMIN — SENNOSIDES AND DOCUSATE SODIUM 1 TABLET: 8.6; 5 TABLET ORAL at 08:39

## 2024-04-05 RX ADMIN — CHOLECALCIFEROL TAB 25 MCG (1000 UNIT) 1000 UNITS: 25 TAB at 09:12

## 2024-04-05 RX ADMIN — BENZONATATE 100 MG: 100 CAPSULE ORAL at 09:14

## 2024-04-05 RX ADMIN — BUDESONIDE AND FORMOTEROL FUMARATE DIHYDRATE 2 PUFF: 160; 4.5 AEROSOL RESPIRATORY (INHALATION) at 09:02

## 2024-04-05 RX ADMIN — RISPERIDONE 2 MG: 2 TABLET ORAL at 08:39

## 2024-04-05 RX ADMIN — BUDESONIDE AND FORMOTEROL FUMARATE DIHYDRATE 2 PUFF: 160; 4.5 AEROSOL RESPIRATORY (INHALATION) at 18:10

## 2024-04-05 RX ADMIN — HYDROCORTISONE: 25 CREAM TOPICAL at 09:01

## 2024-04-05 RX ADMIN — LIDOCAINE 5% 1 PATCH: 700 PATCH TOPICAL at 08:46

## 2024-04-05 RX ADMIN — VENLAFAXINE HYDROCHLORIDE 112.5 MG: 75 CAPSULE, EXTENDED RELEASE ORAL at 08:39

## 2024-04-05 RX ADMIN — Medication 1 TABLET: at 08:39

## 2024-04-05 RX ADMIN — LORAZEPAM 0.5 MG: 0.5 TABLET ORAL at 08:39

## 2024-04-05 RX ADMIN — GUAIFENESIN 1200 MG: 600 TABLET ORAL at 21:02

## 2024-04-05 RX ADMIN — PANTOPRAZOLE SODIUM 40 MG: 40 TABLET, DELAYED RELEASE ORAL at 08:39

## 2024-04-05 RX ADMIN — SIMETHICONE 80 MG: 80 TABLET, CHEWABLE ORAL at 17:21

## 2024-04-05 RX ADMIN — SIMETHICONE 80 MG: 80 TABLET, CHEWABLE ORAL at 11:56

## 2024-04-05 RX ADMIN — METHOCARBAMOL TABLETS 500 MG: 500 TABLET, COATED ORAL at 08:39

## 2024-04-05 RX ADMIN — METHOCARBAMOL TABLETS 750 MG: 500 TABLET, COATED ORAL at 17:21

## 2024-04-05 RX ADMIN — LAMOTRIGINE 100 MG: 100 TABLET ORAL at 17:21

## 2024-04-05 RX ADMIN — MELOXICAM 15 MG: 7.5 TABLET ORAL at 08:39

## 2024-04-05 RX ADMIN — LORAZEPAM 0.5 MG: 0.5 TABLET ORAL at 15:02

## 2024-04-05 RX ADMIN — FLUTICASONE PROPIONATE 1 SPRAY: 50 SPRAY, METERED NASAL at 09:03

## 2024-04-05 RX ADMIN — LAMOTRIGINE 100 MG: 100 TABLET ORAL at 08:46

## 2024-04-05 RX ADMIN — CLOZAPINE 50 MG: 25 TABLET ORAL at 11:53

## 2024-04-05 RX ADMIN — CLOZAPINE 350 MG: 100 TABLET ORAL at 21:00

## 2024-04-05 RX ADMIN — FOLIC ACID 1 MG: 1 TABLET ORAL at 09:13

## 2024-04-05 RX ADMIN — SENNOSIDES AND DOCUSATE SODIUM 1 TABLET: 8.6; 5 TABLET ORAL at 17:21

## 2024-04-05 RX ADMIN — LACTULOSE 20 G: 20 SOLUTION ORAL at 13:01

## 2024-04-05 RX ADMIN — NICOTINE 1 PATCH: 21 PATCH, EXTENDED RELEASE TRANSDERMAL at 08:46

## 2024-04-05 RX ADMIN — SIMETHICONE 80 MG: 80 TABLET, CHEWABLE ORAL at 21:02

## 2024-04-05 RX ADMIN — SIMETHICONE 80 MG: 80 TABLET, CHEWABLE ORAL at 08:40

## 2024-04-05 RX ADMIN — HYDROXYZINE HYDROCHLORIDE 100 MG: 50 TABLET, FILM COATED ORAL at 21:37

## 2024-04-05 RX ADMIN — GUAIFENESIN 1200 MG: 600 TABLET ORAL at 08:39

## 2024-04-05 RX ADMIN — BENZONATATE 100 MG: 100 CAPSULE ORAL at 21:01

## 2024-04-05 NOTE — PROGRESS NOTES
"Progress Note - Behavioral Health   Jo-Ann Lamb 53 y.o. female MRN: [id number]  Unit/Bed#: [facility] 254-01 Encounter: [id number]    Assessment/Plan   Principal Problem:    Schizoaffective disorder, bipolar type (HCC)      Behavior over the last 24 hours:  some improvment  Sleep: normal  Appetite: normal  Medication side effects: No  ROS: no complaints and all other systems are negative    Jo-Ann reports improved mood today and decrease intensity/frequency of AH.  Continues to verbalize delusional content that is chronic in nature regarding intercom system, paranoia, and hypnosis.  Believes her mother is in danger of a woman named Alessia who is a homeless panhandler.  Describes Alessia as \"the lady that lives in the clock.\"  Able to reality test and that she has had these thoughts ongoing for \"years\" and her mother has never been attacked; further stated \"my mom tells me it is all in my head and she is fine.\"  This provider, CM, patient, and ACT member met today to discuss patient progress.  ACT feels patient is at her baseline and has been verbalizing same bizarre content for \"years.\"  Denies return of SI and has been maintaining safety and ADLs on unit.    Mental Status Evaluation:  Appearance:  age appropriate, casually dressed, and well-groomed, blonde hair   Behavior:  Cooperative, calm, redirectable   Speech:  normal pitch and normal volume   Mood:  \"A little better\"   Affect:  Dysphoric , distracted   Thought Process:  illogical and perserverative   Associations: circumstantial associations   Thought Content:  Bizarre and paranoid delusions, ruminating thoughts, however is able to partially reality test   Perceptual Disturbances: Decreased intensity and frequency of AH, noncommanding, described as \"intrusive\" at times   Risk Potential: Suicidal Ideations none  Homicidal Ideations none  Potential for Aggression No   Sensorium:  person, place, time/date, and situation   Memory:  recent and remote memory grossly intact "   Consciousness:  alert and awake    Attention: attention span appeared shorter than expected for age   Insight:  limited   Judgment: limited   Gait/Station: normal gait/station and normal balance   Motor Activity: no abnormal movements     Progress Toward Goals: Some improvement.  Mood improved today and less anxious.  Describes decreased intensity and frequency of AH.  Bizarre delusions persist.  Clozaril level 867ng/mL on 400 mg/daily dosing.  Plan is to add Clozaril 50 mg PO QD to assist with psychosis during the day and decrease Clozaril 350 mg PO QHS.  Utilizing 2 antipsychotics for augmentation of Clozaril therapy and history of multiple failed monotherapy trials. ACT leaves patient is at her baseline.  Will continue to assess for safety and improvement throughout the weekend.  Potential discharge by end of next week.    Recommended Treatment: Continue with group therapy, milieu therapy and occupational therapy.      Risks, benefits and possible side effects of Medications:   Jo-Ann has limited understanding of risk versus benefits of medications, but agrees to take as prescribed.    Medications:   Change Clozaril 50mg PO QD (0900) and 350mg PO QHS  all current active meds have been reviewed and current meds:   Current Facility-Administered Medications   Medication Dose Route Frequency    acetaminophen (TYLENOL) tablet 650 mg  650 mg Oral Q6H PRN    acetaminophen (TYLENOL) tablet 650 mg  650 mg Oral Q4H PRN    acetaminophen (TYLENOL) tablet 975 mg  975 mg Oral Q6H PRN    albuterol (PROVENTIL HFA,VENTOLIN HFA) inhaler 2 puff  2 puff Inhalation Q4H PRN    aluminum-magnesium hydroxide-simethicone (MAALOX) oral suspension 30 mL  30 mL Oral Q4H PRN    benzonatate (TESSALON PERLES) capsule 100 mg  100 mg Oral TID    benztropine (COGENTIN) tablet 1 mg  1 mg Oral Q4H PRN Max 6/day    bisacodyl (DULCOLAX) rectal suppository 10 mg  10 mg Rectal Daily PRN    budesonide-formoterol (SYMBICORT) 160-4.5 mcg/act inhaler 2 puff   2 puff Inhalation BID    Cholecalciferol (VITAMIN D3) tablet 1,000 Units  1,000 Units Oral QAM    cloZAPine (CLOZARIL) tablet 350 mg  350 mg Oral HS    cloZAPine (CLOZARIL) tablet 50 mg  50 mg Oral Daily    hydrOXYzine HCL (ATARAX) tablet 50 mg  50 mg Oral Q6H PRN Max 4/day    Or    diphenhydrAMINE (BENADRYL) injection 50 mg  50 mg Intramuscular Q6H PRN    fenofibrate (TRICOR) tablet 145 mg  145 mg Oral Daily    fluticasone (FLONASE) 50 mcg/act nasal spray 1 spray  1 spray Each Nare Daily    folic acid (FOLVITE) tablet 1 mg  1 mg Oral Daily    guaiFENesin (MUCINEX) 12 hr tablet 1,200 mg  1,200 mg Oral Q12H KENNY    hydrocortisone (ANUSOL-HC) 2.5 % rectal cream   Topical 4x Daily PRN    hydrOXYzine HCL (ATARAX) tablet 100 mg  100 mg Oral Q6H PRN Max 4/day    Or    LORazepam (ATIVAN) injection 1 mg  1 mg Intramuscular Q6H PRN    hydrOXYzine HCL (ATARAX) tablet 25 mg  25 mg Oral Q6H PRN Max 4/day    lactulose (CHRONULAC) oral solution 20 g  20 g Oral Daily PRN    lamoTRIgine (LaMICtal) tablet 100 mg  100 mg Oral BID    lidocaine (LIDODERM) 5 % patch 1 patch  1 patch Topical Daily    LORazepam (ATIVAN) tablet 0.5 mg  0.5 mg Oral BID    meloxicam (MOBIC) tablet 15 mg  15 mg Oral Daily    methocarbamol (ROBAXIN) tablet 750 mg  750 mg Oral BID    multivitamin-minerals (CENTRUM) tablet 1 tablet  1 tablet Oral Daily    nicotine (NICODERM CQ) 21 mg/24 hr TD 24 hr patch 1 patch  1 patch Transdermal Daily    nicotine polacrilex (NICORETTE) gum 4 mg  4 mg Oral Q2H PRN    pantoprazole (PROTONIX) EC tablet 40 mg  40 mg Oral Daily Before Breakfast    polyethylene glycol (MIRALAX) packet 17 g  17 g Oral Daily PRN    risperiDONE (RisperDAL M-TAB) disintegrating tablet 1 mg  1 mg Oral Q6H PRN Max 3/day    risperiDONE (RisperDAL) tablet 2 mg  2 mg Oral BID    senna-docusate sodium (SENOKOT S) 8.6-50 mg per tablet 1 tablet  1 tablet Oral Daily PRN    senna-docusate sodium (SENOKOT S) 8.6-50 mg per tablet 1 tablet  1 tablet Oral BID     simethicone (MYLICON) chewable tablet 80 mg  80 mg Oral 4x Daily (with meals and at bedtime)    thiamine tablet 100 mg  100 mg Oral Daily    traZODone (DESYREL) tablet 50 mg  50 mg Oral HS PRN    venlafaxine (EFFEXOR-XR) 24 hr capsule 112.5 mg  112.5 mg Oral Daily   .    Labs: I have personally reviewed all pertinent laboratory/tests results. CBC:   Lab Results   Component Value Date    WBC 8.90 04/04/2024    RBC 4.80 04/04/2024    HGB 14.1 04/04/2024    HCT 44.4 04/04/2024    MCV 93 04/04/2024     04/04/2024    MCH 29.4 04/04/2024    MCHC 31.8 04/04/2024    RDW 14.0 04/04/2024    MPV 9.9 04/04/2024    NEUTROABS 5.54 04/04/2024     CMP:   Lab Results   Component Value Date    SODIUM 137 03/23/2024    K 4.3 03/23/2024     03/23/2024    CO2 28 03/23/2024    AGAP 8 03/23/2024    BUN 6 03/23/2024    CREATININE 0.56 (L) 03/23/2024    GLUC 88 03/23/2024    GLUF 88 03/23/2024    CALCIUM 8.9 03/23/2024    AST 13 03/23/2024    ALT 10 03/23/2024    ALKPHOS 76 03/23/2024    TP 6.2 (L) 03/23/2024    ALB 3.8 03/23/2024    TBILI 0.18 (L) 03/23/2024    EGFR 107 03/23/2024     Clozapine:   Lab Results   Component Value Date    CLOZAPINE 867 04/04/2024    NCLOZIP 241 11/29/2023     EKG   Lab Results   Component Value Date    VENTRATE 68 03/23/2024    ATRIALRATE 68 03/23/2024    PRINT 152 03/23/2024    QRSDINT 68 03/23/2024    QTINT 440 03/23/2024    QTCINT 467 03/23/2024    PAXIS 58 03/23/2024    QRSAXIS 3 03/23/2024    TWAVEAXIS 50 03/23/2024       Counseling / Coordination of Care  Total floor / unit time spent today 40 minutes. Greater than 50% of total time was spent with the patient and / or family counseling and / or coordination of care. A description of the counseling / coordination of care: Medication education, treatment plan, safety planning, meeting with ACT to discuss progress/safety/discharge planning

## 2024-04-05 NOTE — NURSING NOTE
Patient visible on unit .Pleasant and cooperative.Schedule PO medication administered as ordered.Intermitted A/V hallucination.Denies HI, SI.  Appetite good. Attend group.Continue on safety check.

## 2024-04-05 NOTE — SOCIAL WORK
Sw, NP met with pt, Cipriano Newton to discuss progress, possible dc next week pending tx, meds. Pt voiced having active AH, appears to be responding to internal stimuli.

## 2024-04-05 NOTE — PROGRESS NOTES
Progress Note - Jo-Ann Lamb 53 y.o. female MRN: [id number]    Unit/Bed#: [facility] 254-01 Encounter: [id number]        Subjective:   Patient seen and examined at bedside after reviewing the chart and discussing the case with the caring staff.      Patient examined at bedside.  Patient is requesting an increase in Robaxin due to muscle spasms/restless legs.     Patient is a possible discharge next week.     Physical Exam   Vitals: Blood pressure 105/80, pulse 98, temperature 97.8 °F (36.6 °C), temperature source Temporal, resp. rate 16, height 5' (1.524 m), weight 64.8 kg (142 lb 12.8 oz), last menstrual period 01/01/2020, SpO2 95%, not currently breastfeeding.,Body mass index is 27.89 kg/m².  Constitutional: Patient in no acute distress.  HEENT: PERR, EOMI, MMM.  Cardiovascular: Normal rate and regular rhythm.    Pulmonary/Chest: Effort normal and breath sounds normal.   Abdomen: Soft, + BS, NT, no rebound, no guarding, no rigidity.     Assessment/Plan:  Jo-Ann Lamb is a(n) 53 y.o. year old female with schizoaffective disorder.     Asthma.  Patient on Symbicort inhaler twice daily and albuterol as needed.   Hypertriglyceridemia.  Patient on Fenofibrate 145 mg daily. Lipid panel 3/23/24: cholesterol 221, triglycerides 211, . Patient declines statin at this time and would like to f/up with PCP outpt.   Allergic rhinitis.  Patient on Flonase daily.   GERD. Patient on Protonix 40 mg daily.   Tobacco abuse. NRT.   Alcohol abuse.  Patient started on thiamine 100 mg, folic acid 1 mg, and multivitamin daily.   Chronic back pain.  Patient on Mobic 15 mg daily, Robaxin 500 mg twice daily, and lidocaine patch daily. Increase Robaxin to 750 mg twice daily 4/5.  Vitamin D deficiency.  Continue daily supplement.   Cough/congestion.  Increase Mucinex to 1200 mg twice daily 3/30.  Tessalon pearls as needed.    Callus of R great toe.  Encouraged pt to f/u with podiatry outpatient.    Constipation/hemorrhoids/flatulence.  On  senokot S twice daily.  Miralax and lactulose as needed.  Apply Anusol daily.  Add simethicone 80 mg QID.    Vaginal candidiasis.  Diflucan 150 mg x 1 dose 3/30.    Overactive bladder.  Would avoid ditropan at this time due to possible worsening constipation with Clozaril.  Recommended pelvic floor muscle exercises.  Patient encouraged to follow-up with PCP/gyn on outpatient basis.   Routine STD testing. RPR, HIV, Hep C Ab, Hep B surface Ag, Chlamydia/GC all negative. HSV pending.    The patient was discussed with Dr. Steel and he is in agreement with the above note.

## 2024-04-05 NOTE — PROGRESS NOTES
04/05/24   Team Meeting   Meeting Type Daily Rounds   Team Members Present   Team Members Present Physician;Nurse;Occupational Therapist;   Physician Team Member Dr Tatiana LARSON; Yair COLEMAN   Nursing Team Member Mirella JIMENES   Social Work Team Member Abelardo HOPSON   OT Team Member Gurjit HUITRON   Patient/Family Present   Patient Present No   Patient's Family Present No     ACT visit today 1230pm, 867 last clozaril level, AH improving overall but still prevalent, distressed, cares for ADLs, slept, responding to internal stimuli, paranoid about being following her, being hypnotized, meds adjusted, possible ECT for psychosis; no dc date.

## 2024-04-05 NOTE — NURSING NOTE
Patient pleasant and cooperative. Continues to endorse AH. Denies SI,HI,anxiety and depression. Withdrawn to room minimally social with peers. Safety checks continue Q 7 minutes.

## 2024-04-06 PROCEDURE — 99232 SBSQ HOSP IP/OBS MODERATE 35: CPT

## 2024-04-06 RX ADMIN — BUDESONIDE AND FORMOTEROL FUMARATE DIHYDRATE 2 PUFF: 160; 4.5 AEROSOL RESPIRATORY (INHALATION) at 17:54

## 2024-04-06 RX ADMIN — LAMOTRIGINE 100 MG: 100 TABLET ORAL at 08:36

## 2024-04-06 RX ADMIN — LACTULOSE 20 G: 20 SOLUTION ORAL at 11:06

## 2024-04-06 RX ADMIN — LORAZEPAM 0.5 MG: 0.5 TABLET ORAL at 08:37

## 2024-04-06 RX ADMIN — GUAIFENESIN 1200 MG: 600 TABLET ORAL at 20:55

## 2024-04-06 RX ADMIN — RISPERIDONE 2 MG: 2 TABLET ORAL at 15:29

## 2024-04-06 RX ADMIN — THIAMINE HCL TAB 100 MG 100 MG: 100 TAB at 08:37

## 2024-04-06 RX ADMIN — SENNOSIDES AND DOCUSATE SODIUM 1 TABLET: 8.6; 5 TABLET ORAL at 08:37

## 2024-04-06 RX ADMIN — LAMOTRIGINE 100 MG: 100 TABLET ORAL at 17:54

## 2024-04-06 RX ADMIN — VENLAFAXINE HYDROCHLORIDE 112.5 MG: 75 CAPSULE, EXTENDED RELEASE ORAL at 08:37

## 2024-04-06 RX ADMIN — CHOLECALCIFEROL TAB 25 MCG (1000 UNIT) 1000 UNITS: 25 TAB at 08:36

## 2024-04-06 RX ADMIN — FOLIC ACID 1 MG: 1 TABLET ORAL at 08:37

## 2024-04-06 RX ADMIN — METHOCARBAMOL TABLETS 750 MG: 500 TABLET, COATED ORAL at 17:54

## 2024-04-06 RX ADMIN — SIMETHICONE 80 MG: 80 TABLET, CHEWABLE ORAL at 13:38

## 2024-04-06 RX ADMIN — SENNOSIDES AND DOCUSATE SODIUM 1 TABLET: 8.6; 5 TABLET ORAL at 17:54

## 2024-04-06 RX ADMIN — MELOXICAM 15 MG: 7.5 TABLET ORAL at 08:36

## 2024-04-06 RX ADMIN — LIDOCAINE 5% 1 PATCH: 700 PATCH TOPICAL at 08:36

## 2024-04-06 RX ADMIN — METHOCARBAMOL TABLETS 750 MG: 500 TABLET, COATED ORAL at 08:37

## 2024-04-06 RX ADMIN — SIMETHICONE 80 MG: 80 TABLET, CHEWABLE ORAL at 16:49

## 2024-04-06 RX ADMIN — FENOFIBRATE 145 MG: 145 TABLET, FILM COATED ORAL at 08:37

## 2024-04-06 RX ADMIN — BUDESONIDE AND FORMOTEROL FUMARATE DIHYDRATE 2 PUFF: 160; 4.5 AEROSOL RESPIRATORY (INHALATION) at 08:44

## 2024-04-06 RX ADMIN — BENZONATATE 100 MG: 100 CAPSULE ORAL at 15:29

## 2024-04-06 RX ADMIN — CLOZAPINE 50 MG: 25 TABLET ORAL at 08:37

## 2024-04-06 RX ADMIN — BENZONATATE 100 MG: 100 CAPSULE ORAL at 20:55

## 2024-04-06 RX ADMIN — CLOZAPINE 350 MG: 100 TABLET ORAL at 20:55

## 2024-04-06 RX ADMIN — NICOTINE 1 PATCH: 21 PATCH, EXTENDED RELEASE TRANSDERMAL at 08:41

## 2024-04-06 RX ADMIN — GUAIFENESIN 1200 MG: 600 TABLET ORAL at 08:36

## 2024-04-06 RX ADMIN — SIMETHICONE 80 MG: 80 TABLET, CHEWABLE ORAL at 20:55

## 2024-04-06 RX ADMIN — SIMETHICONE 80 MG: 80 TABLET, CHEWABLE ORAL at 08:37

## 2024-04-06 RX ADMIN — LORAZEPAM 0.5 MG: 0.5 TABLET ORAL at 15:29

## 2024-04-06 RX ADMIN — Medication 1 TABLET: at 08:37

## 2024-04-06 RX ADMIN — PANTOPRAZOLE SODIUM 40 MG: 40 TABLET, DELAYED RELEASE ORAL at 08:36

## 2024-04-06 RX ADMIN — FLUTICASONE PROPIONATE 1 SPRAY: 50 SPRAY, METERED NASAL at 08:44

## 2024-04-06 RX ADMIN — RISPERIDONE 2 MG: 2 TABLET ORAL at 08:36

## 2024-04-06 RX ADMIN — HYDROCORTISONE: 25 CREAM TOPICAL at 09:35

## 2024-04-06 RX ADMIN — BENZONATATE 100 MG: 100 CAPSULE ORAL at 08:37

## 2024-04-06 NOTE — PLAN OF CARE
Problem: Alteration in Thoughts and Perception  Goal: Refrain from acting on delusional thinking/internal stimuli  Description: Interventions:  - Monitor patient closely, per order   - Utilize least restrictive measures   - Set reasonable limits, give positive feedback for acceptable   - Administer medications as ordered and monitor of potential side effects  Outcome: Progressing     Problem: Risk for Self Injury/Neglect  Goal: Treatment Goal: Remain safe during length of stay, learn and adopt new coping skills, and be free of self-injurious ideation, impulses and acts at the time of discharge  Outcome: Progressing  Goal: Refrain from harming self  Description: Interventions:  - Monitor patient closely, per order  - Develop a trusting relationship  - Supervise medication ingestion, monitor effects and side effects   Outcome: Progressing     Problem: Depression  Goal: Treatment Goal: Demonstrate behavioral control of depressive symptoms, verbalize feelings of improved mood/affect, and adopt new coping skills prior to discharge  Outcome: Progressing   See chart note

## 2024-04-06 NOTE — NURSING NOTE
Patient visible on unit.Pleasant and cooperative. Labile. Flat affect.Bright  on  approach.Paranoid at time.Schedule PO medication administered as ordered. Denies HI, SI.Appetite good. Attend group.Continue on safety check

## 2024-04-06 NOTE — PROGRESS NOTES
"Progress Note - Behavioral Health   Jo-Ann Lamb 53 y.o. female MRN: [id number]  Unit/Bed#: [facility] 254-01 Encounter: [id number]    Assessment/Plan   Principal Problem:    Schizoaffective disorder, bipolar type (HCC)      Recommended Treatment:   Continue Clozapine 50 mg daily and 350 mg QHS for psychosis  Continue Lamictal 100 mg BID for mood stabilization  Continue Ativan 0.5 mg BID for anxiety  Continue Risperdal 2 mg BID for mood stabilization and psychosis  Continue Effexor .5 mg daily for mood and anxiety  Continue with group therapy, milieu therapy and occupational therapy.    Continue frequent safety checks and vitals per unit protocol.  Continue medication management per SLIM as indicated.  Continue vitamin supplementation   Case discussed with treatment team.  Discharge disposition: TBD  Legal status: 201    Risks, benefits and possible side effects of Medications: Risks, benefits, and possible side effects of medications have previously been explained. No new medications at this time.    ------------------------------------------------------------    Subjective: Per nursing report, Jo-Ann CUMMINGS has been visible and cooperative on the unit and compliant with medications. Patient has reported anxiety requiring PRNs. She required PRN Atarax 100 mg at 2137, and Lactulose 20 g at 1301 yesterday.    Patient seen and evaluated for continuity of care.  Today, Jo-Ann CUMMINGS is consenting for safety on the unit. She reports feeling, \"better,\" and discusses with this writer the adjustment to Clozapine has had her, \"Feeling good,\" the last 2 days. The patient endorses she slept well and ate well. She reports, \"the hallucinations are fine today.\" She continues to experience AH. The patient denies SI, HI, passive death wishes, or thoughts to harm self or others. The patient denies adverse effects of medication at time of evaluation.    Progress Toward Goals: slow improvement    Psychiatric Review of Systems:  Behavior over the " "last 24 hours:  improving  Sleep: improving  Appetite: adequate  Medication side effects: none verbalized  ROS: Complete review of systems is negative except as noted above.    Vital signs in last 24 hours:  Temp:  [97.1 °F (36.2 °C)-97.8 °F (36.6 °C)] 97.8 °F (36.6 °C)  HR:  [] 86  Resp:  [16-17] 17  BP: (102-111)/(70-74) 102/70    Mental Status Exam:  Appearance:  alert, good eye contact, appears stated age, casually dressed, and appropriate grooming and hygiene   Behavior:  calm and cooperative   Motor: no abnormal movements and normal gait and balance   Speech:  spontaneous, clear, normal rate, not pressured, normal volume, not hyperverbal, and coherent   Mood:  \"Feeling good\"   Affect:  mood-congruent   Thought Process:  Generally coherent and linear, concrete associations   Thought Content: delusions of paranoia, ruminating thoughts   Perceptual disturbances: auditory hallucinations - chronic, of multiple voices and denies visual hallucinations   Risk Potential: No active or passive suicidal or homicidal ideation was verbalized during interview   Cognition: oriented to self and situation, appears to be of average intelligence, and impaired abstract reasoning   Insight:  Limited   Judgment: Limited     Current Medications:  Current Facility-Administered Medications   Medication Dose Route Frequency Provider Last Rate    acetaminophen  650 mg Oral Q6H PRN Taylor Castro MD      acetaminophen  650 mg Oral Q4H PRN Taylor Castro MD      acetaminophen  975 mg Oral Q6H PRN Taylor Castro MD      albuterol  2 puff Inhalation Q4H PRN Zandra Barclay PA-C      aluminum-magnesium hydroxide-simethicone  30 mL Oral Q4H PRN Taylor Castro MD      benzonatate  100 mg Oral TID Zandra Barclay PA-C      benztropine  1 mg Oral Q4H PRN Max 6/day Taylor Castro MD      bisacodyl  10 mg Rectal Daily PRN Taylor Castro MD      budesonide-formoterol  2 puff Inhalation BID Zandra Osorio" MARTINE Barclay      cholecalciferol  1,000 Units Oral QAM Zandra Barclay PA-C      cloZAPine  350 mg Oral HS Bri M Medei, CRNP      cloZAPine  50 mg Oral Daily Bri M Medei, CRNP      hydrOXYzine HCL  50 mg Oral Q6H PRN Max 4/day Taylor Castro MD      Or    diphenhydrAMINE  50 mg Intramuscular Q6H PRN Taylor Castro MD      fenofibrate  145 mg Oral Daily Zandra Barclay PA-C      fluticasone  1 spray Each Nare Daily Zandra Barclay PA-C      folic acid  1 mg Oral Daily Zandra Barclay PA-C      guaiFENesin  1,200 mg Oral Q12H KENNY Payton Conrad PA-C      hydrocortisone   Topical 4x Daily PRN Zandra Barclay PA-C      hydrOXYzine HCL  100 mg Oral Q6H PRN Max 4/day Bri Mazariegos, JARED      Or    LORazepam  1 mg Intramuscular Q6H PRN Bri M Medei, CRNP      hydrOXYzine HCL  25 mg Oral Q6H PRN Max 4/day Taylor Castro MD      lactulose  20 g Oral Daily PRN Zandra Barclay PA-C      lamoTRIgine  100 mg Oral BID Vadim Dakota, DO      lidocaine  1 patch Topical Daily Zandra Barclay PA-C      LORazepam  0.5 mg Oral BID Bri Mazariegos, JARED      meloxicam  15 mg Oral Daily Zandra Barclay PA-C      methocarbamol  750 mg Oral BID Zandra Barclay PA-C      multivitamin-minerals  1 tablet Oral Daily Zandra Barclay PA-C      nicotine  1 patch Transdermal Daily Vadim Duncan, DO      nicotine polacrilex  4 mg Oral Q2H PRN Taylor Castro MD      pantoprazole  40 mg Oral Daily Before Breakfast Zandra Barclay PA-C      polyethylene glycol  17 g Oral Daily PRN Taylor Castro MD      risperiDONE  1 mg Oral Q6H PRN Max 3/day Bri M Medei, CRNP      risperiDONE  2 mg Oral BID Bri M Medei, CRNP      senna-docusate sodium  1 tablet Oral Daily PRN Taylor Castro MD      senna-docusate sodium  1 tablet Oral BID JARED Bolton      simethicone  80 mg Oral 4x Daily (with meals and at bedtime) Payton Conrad,  MARTINE      thiamine  100 mg Oral Daily Zandra Barclay PA-C      traZODone  50 mg Oral HS PRN Tayolr Castro MD      venlafaxine  112.5 mg Oral Daily JARED Bolton         Behavioral Health Medications: all current active meds have been reviewed. Changes as in plan section above.    Laboratory results:  I have personally reviewed all pertinent laboratory/tests results.  No results found for this or any previous visit (from the past 48 hour(s)).     Dunia Diaz DO  Psychiatry Resident, PGY-III

## 2024-04-06 NOTE — NURSING NOTE
Patient visible on unit. Pleasant and cooperative. Patient said she needed something for anxiety due to the voices and racing thoughts. Martines score 25. Atarax 100 given at 2137. Denies SI,HI, will monitor. Safety checks continue Q 7 minutes.

## 2024-04-06 NOTE — PROGRESS NOTES
Progress Note - Jo-Ann Lamb 53 y.o. female MRN: [id number]    Unit/Bed#: [facility] 254-01 Encounter: [id number]        Subjective:   Patient seen and examined at bedside after reviewing the chart and discussing the case with the caring staff.      Patient examined at bedside.  Patient has no acute symptoms.     Patient is a possible discharge next week.     Physical Exam   Vitals: Blood pressure 102/70, pulse 86, temperature 97.8 °F (36.6 °C), temperature source Temporal, resp. rate 17, height 5' (1.524 m), weight 64.8 kg (142 lb 12.8 oz), last menstrual period 01/01/2020, SpO2 93%, not currently breastfeeding.,Body mass index is 27.89 kg/m².  Constitutional: Patient in no acute distress.  HEENT: PERR, EOMI, MMM.  Cardiovascular: Normal rate and regular rhythm.    Pulmonary/Chest: Effort normal and breath sounds normal.   Abdomen: Soft, + BS, NT, no rebound, no guarding, no rigidity.     Assessment/Plan:  Jo-Ann Lamb is a(n) 53 y.o. year old female with schizoaffective disorder.     Asthma.  Patient on Symbicort inhaler twice daily and albuterol as needed.   Hypertriglyceridemia.  Patient on Fenofibrate 145 mg daily. Lipid panel 3/23/24: cholesterol 221, triglycerides 211, . Patient declines statin at this time and would like to f/up with PCP outpt.   Allergic rhinitis.  Patient on Flonase daily.   GERD. Patient on Protonix 40 mg daily.   Tobacco abuse. NRT.   Alcohol abuse.  Patient started on thiamine 100 mg, folic acid 1 mg, and multivitamin daily.   Chronic back pain.  Patient on Mobic 15 mg daily, Robaxin 500 mg twice daily, and lidocaine patch daily. Increase Robaxin to 750 mg twice daily 4/5.  Vitamin D deficiency.  Continue daily supplement.   Cough/congestion.  Increase Mucinex to 1200 mg twice daily 3/30.  Tessalon pearls as needed.    Callus of R great toe.  Encouraged pt to f/u with podiatry outpatient.    Constipation/hemorrhoids/flatulence.  On senokot S twice daily.  Miralax and lactulose as needed.   Apply Anusol daily.  Add simethicone 80 mg QID.    Vaginal candidiasis.  Diflucan 150 mg x 1 dose 3/30.    Overactive bladder.  Would avoid ditropan at this time due to possible worsening constipation with Clozaril.  Recommended pelvic floor muscle exercises.  Patient encouraged to follow-up with PCP/gyn on outpatient basis.   Routine STD testing. RPR, HIV, Hep C Ab, Hep B surface Ag, Chlamydia/GC all negative. HSV pending.    The patient was discussed with Dr. Steel and he is in agreement with the above note.

## 2024-04-07 PROCEDURE — 99232 SBSQ HOSP IP/OBS MODERATE 35: CPT

## 2024-04-07 RX ADMIN — METHOCARBAMOL TABLETS 750 MG: 500 TABLET, COATED ORAL at 17:12

## 2024-04-07 RX ADMIN — THIAMINE HCL TAB 100 MG 100 MG: 100 TAB at 08:42

## 2024-04-07 RX ADMIN — VENLAFAXINE HYDROCHLORIDE 112.5 MG: 75 CAPSULE, EXTENDED RELEASE ORAL at 08:42

## 2024-04-07 RX ADMIN — BUDESONIDE AND FORMOTEROL FUMARATE DIHYDRATE 2 PUFF: 160; 4.5 AEROSOL RESPIRATORY (INHALATION) at 17:12

## 2024-04-07 RX ADMIN — FENOFIBRATE 145 MG: 145 TABLET, FILM COATED ORAL at 08:43

## 2024-04-07 RX ADMIN — RISPERIDONE 1 MG: 1 TABLET, ORALLY DISINTEGRATING ORAL at 12:07

## 2024-04-07 RX ADMIN — LAMOTRIGINE 100 MG: 100 TABLET ORAL at 17:12

## 2024-04-07 RX ADMIN — LACTULOSE 20 G: 20 SOLUTION ORAL at 17:39

## 2024-04-07 RX ADMIN — CHOLECALCIFEROL TAB 25 MCG (1000 UNIT) 1000 UNITS: 25 TAB at 08:42

## 2024-04-07 RX ADMIN — LAMOTRIGINE 100 MG: 100 TABLET ORAL at 08:42

## 2024-04-07 RX ADMIN — METHOCARBAMOL TABLETS 750 MG: 500 TABLET, COATED ORAL at 08:43

## 2024-04-07 RX ADMIN — BUDESONIDE AND FORMOTEROL FUMARATE DIHYDRATE 2 PUFF: 160; 4.5 AEROSOL RESPIRATORY (INHALATION) at 08:46

## 2024-04-07 RX ADMIN — SIMETHICONE 80 MG: 80 TABLET, CHEWABLE ORAL at 08:46

## 2024-04-07 RX ADMIN — RISPERIDONE 2 MG: 2 TABLET ORAL at 08:42

## 2024-04-07 RX ADMIN — MELOXICAM 15 MG: 7.5 TABLET ORAL at 08:43

## 2024-04-07 RX ADMIN — LORAZEPAM 0.5 MG: 0.5 TABLET ORAL at 15:28

## 2024-04-07 RX ADMIN — SIMETHICONE 80 MG: 80 TABLET, CHEWABLE ORAL at 20:38

## 2024-04-07 RX ADMIN — LORAZEPAM 0.5 MG: 0.5 TABLET ORAL at 08:42

## 2024-04-07 RX ADMIN — GUAIFENESIN 1200 MG: 600 TABLET ORAL at 08:42

## 2024-04-07 RX ADMIN — BENZONATATE 100 MG: 100 CAPSULE ORAL at 20:38

## 2024-04-07 RX ADMIN — NICOTINE 1 PATCH: 21 PATCH, EXTENDED RELEASE TRANSDERMAL at 09:13

## 2024-04-07 RX ADMIN — BENZONATATE 100 MG: 100 CAPSULE ORAL at 15:28

## 2024-04-07 RX ADMIN — Medication 1 TABLET: at 08:43

## 2024-04-07 RX ADMIN — BENZONATATE 100 MG: 100 CAPSULE ORAL at 08:42

## 2024-04-07 RX ADMIN — CLOZAPINE 50 MG: 25 TABLET ORAL at 08:43

## 2024-04-07 RX ADMIN — RISPERIDONE 2 MG: 2 TABLET ORAL at 15:28

## 2024-04-07 RX ADMIN — CLOZAPINE 350 MG: 100 TABLET ORAL at 20:38

## 2024-04-07 RX ADMIN — PANTOPRAZOLE SODIUM 40 MG: 40 TABLET, DELAYED RELEASE ORAL at 08:46

## 2024-04-07 RX ADMIN — SENNOSIDES AND DOCUSATE SODIUM 1 TABLET: 8.6; 5 TABLET ORAL at 08:42

## 2024-04-07 RX ADMIN — FLUTICASONE PROPIONATE 1 SPRAY: 50 SPRAY, METERED NASAL at 08:46

## 2024-04-07 RX ADMIN — SIMETHICONE 80 MG: 80 TABLET, CHEWABLE ORAL at 15:54

## 2024-04-07 RX ADMIN — SENNOSIDES AND DOCUSATE SODIUM 1 TABLET: 8.6; 5 TABLET ORAL at 17:12

## 2024-04-07 RX ADMIN — GUAIFENESIN 1200 MG: 600 TABLET ORAL at 20:38

## 2024-04-07 RX ADMIN — SIMETHICONE 80 MG: 80 TABLET, CHEWABLE ORAL at 11:40

## 2024-04-07 RX ADMIN — LIDOCAINE 5% 1 PATCH: 700 PATCH TOPICAL at 09:13

## 2024-04-07 RX ADMIN — FOLIC ACID 1 MG: 1 TABLET ORAL at 08:43

## 2024-04-07 NOTE — NURSING NOTE
"Patient observed intermittently within the milieu although is mostly withdrawn to self. Brighter on approach and happy to report that she hasn't had auditory hallucinations in two days, states she finds relief in her increase in Clozaril and reports feeling \"stable\". She denies SI/HI and reports her anxiety and depression are minimal. She remains compliant with medication as ordered. Denies any other needs at this time. Q7 minute checks ongoing.    "

## 2024-04-07 NOTE — NURSING NOTE
Patient visible on unit. Pleasant and cooperative.Schedule PO medication administered as ordered.Continue to respond to internally stimuli.Denies  HI, SI. Appetite good. Attend group.Continue on safety check

## 2024-04-07 NOTE — PROGRESS NOTES
Progress Note - Jo-Ann Lamb 53 y.o. female MRN: [id number]    Unit/Bed#: [facility] 254-01 Encounter: [id number]        Subjective:   Patient seen and examined at bedside after reviewing the chart and discussing the case with the caring staff.      Patient examined at bedside.  Patient has no acute symptoms.     Patient is a possible discharge next week.     Physical Exam   Vitals: Blood pressure 105/77, pulse 104, temperature 97.5 °F (36.4 °C), temperature source Temporal, resp. rate 18, height 5' (1.524 m), weight 65.6 kg (144 lb 9.6 oz), last menstrual period 01/01/2020, SpO2 95%, not currently breastfeeding.,Body mass index is 28.24 kg/m².  Constitutional: Patient in no acute distress.  HEENT: PERR, EOMI, MMM.  Cardiovascular: Normal rate and regular rhythm.    Pulmonary/Chest: Effort normal and breath sounds normal.   Abdomen: Soft, + BS, NT, no rebound, no guarding, no rigidity.     Assessment/Plan:  Jo-Ann Lamb is a(n) 53 y.o. year old female with schizoaffective disorder.     Asthma.  Patient on Symbicort inhaler twice daily and albuterol as needed.   Hypertriglyceridemia.  Patient on Fenofibrate 145 mg daily. Lipid panel 3/23/24: cholesterol 221, triglycerides 211, . Patient declines statin at this time and would like to f/up with PCP outpt.   Allergic rhinitis.  Patient on Flonase daily.   GERD. Patient on Protonix 40 mg daily.   Tobacco abuse. NRT.   Alcohol abuse.  Patient started on thiamine 100 mg, folic acid 1 mg, and multivitamin daily.   Chronic back pain.  Patient on Mobic 15 mg daily, Robaxin 500 mg twice daily, and lidocaine patch daily. Increase Robaxin to 750 mg twice daily 4/5.  Vitamin D deficiency.  Continue daily supplement.   Cough/congestion.  Increase Mucinex to 1200 mg twice daily 3/30.  Tessalon pearls as needed.    Callus of R great toe.  Encouraged pt to f/u with podiatry outpatient.    Constipation/hemorrhoids/flatulence.  On senokot S twice daily.  Miralax and lactulose as needed.   Apply Anusol daily.  Add simethicone 80 mg QID.    Vaginal candidiasis.  Diflucan 150 mg x 1 dose 3/30.    Overactive bladder.  Would avoid ditropan at this time due to possible worsening constipation with Clozaril.  Recommended pelvic floor muscle exercises.  Patient encouraged to follow-up with PCP/gyn on outpatient basis.   Routine STD testing. RPR, HIV, Hep C Ab, Hep B surface Ag, Chlamydia/GC all negative. HSV pending.    The patient was discussed with Dr. Steel and he is in agreement with the above note.

## 2024-04-07 NOTE — NURSING NOTE
Patient  observe  responding  to internal stimuli.Risperdal 1 mg  administer  at 1207 for  Borset of  3.

## 2024-04-07 NOTE — PLAN OF CARE
Problem: Alteration in Thoughts and Perception  Goal: Refrain from acting on delusional thinking/internal stimuli  Description: Interventions:  - Monitor patient closely, per order   - Utilize least restrictive measures   - Set reasonable limits, give positive feedback for acceptable   - Administer medications as ordered and monitor of potential side effects  Outcome: Progressing  Goal: Recognize dysfunctional thoughts, communicate reality-based thoughts at the time of discharge  Description: Interventions:  - Provide medication and psycho-education to assist patient in compliance and developing insight into his/her illness   Outcome: Progressing

## 2024-04-07 NOTE — PROGRESS NOTES
"Progress Note - Behavioral Health   Jo-Ann Lamb 53 y.o. female MRN: [id number]  Unit/Bed#: [facility] 254-01 Encounter: [id number]    Assessment/Plan   Principal Problem:    Schizoaffective disorder, bipolar type (HCC)      Recommended Treatment:   Continue Clozapine 50 mg daily and 300 mg QHS for psychosis  Continue Lamictal 100 mg BID for mood stabilization  Continue Ativan 0.5 mg BID for anxiety  Continue Risperdal 2 mg BID for mood stabilization and psychosis  Continue Effexor .5 mg daily for mood and anxiety  Continue with group therapy, milieu therapy and occupational therapy.    Continue frequent safety checks and vitals per unit protocol.  Continue medication management per SLIM as indicated.  Continue vitamin supplementation   Case discussed with treatment team.  Discharge disposition: TBD  Legal status: 201    Risks, benefits and possible side effects of Medications: Risks, benefits, and possible side effects of medications have previously been explained. No new medications at this time.    ------------------------------------------------------------    Subjective: Per nursing report, Jo-Ann CUMMINGS has been visible and cooperative on the unit and compliant with medications. Patient was withdrawn to self, brightens on approach with staff. Reporting decreases in her AH. Patient required Lactulose 20 g at 1106 yesterday, no psychotropic PRNs required yesterday.    Patient seen and evaluated for continuity of care.  Today, Jo-Ann CUMMINGS is consenting for safety on the unit. She reports feeling well this morning, she endorses AH continue to decrease since adjusting Clozapine to include a morning dose. She notes she did have increased levels of anxiety yesterday due to intrusive thoughts. She notes some continuation of intrusive thoughts this morning. She endorses sleep and appetite are good, and overall she is \"doing okay.\"  The patient denies SI, HI, passive death wishes, or thoughts to harm self or others.  The patient " "denies adverse effects of medication at time of evaluation.    Progress Toward Goals: slow improvement    Psychiatric Review of Systems:  Behavior over the last 24 hours:  improving  Sleep: improving  Appetite: adequate  Medication side effects: none verbalized  ROS: Complete review of systems is negative except as noted above.    Vital signs in last 24 hours:  Temp:  [97.2 °F (36.2 °C)-97.5 °F (36.4 °C)] 97.5 °F (36.4 °C)  HR:  [] 104  Resp:  [16-18] 18  BP: (105-138)/(77-94) 105/77    Mental Status Exam:  Appearance:  alert, good eye contact, appears stated age, casually dressed, and appropriate grooming and hygiene   Behavior:  calm and cooperative   Motor: no abnormal movements and normal gait and balance   Speech:  spontaneous, clear, normal rate, not pressured, normal volume, not hyperverbal, and coherent   Mood:  \"Doing okay\"   Affect:  mood-congruent   Thought Process:  Generally coherent and linear, concrete associations   Thought Content: delusions of paranoia, intrusive thoughts, ruminating thoughts   Perceptual disturbances: auditory hallucinations - chronic, of multiple voices, reduced compared to prior and denies visual hallucinations   Risk Potential: No active or passive suicidal or homicidal ideation was verbalized during interview   Cognition: oriented to self and situation, appears to be of average intelligence, and impaired abstract reasoning   Insight:  Limited   Judgment: Limited     Current Medications:  Current Facility-Administered Medications   Medication Dose Route Frequency Provider Last Rate    acetaminophen  650 mg Oral Q6H PRN Taylor Castro MD      acetaminophen  650 mg Oral Q4H PRN Taylor Castro MD      acetaminophen  975 mg Oral Q6H PRN Taylor Castro MD      albuterol  2 puff Inhalation Q4H PRN Zandra Barclay PA-C      aluminum-magnesium hydroxide-simethicone  30 mL Oral Q4H PRN Taylor Castro MD      benzonatate  100 mg Oral TID Zandra Barclay, " MARTINE      benztropine  1 mg Oral Q4H PRN Max 6/day Taylor Castro MD      bisacodyl  10 mg Rectal Daily PRN Taylor Castro MD      budesonide-formoterol  2 puff Inhalation BID Zandra Barclay PA-C      cholecalciferol  1,000 Units Oral QAM Zandra Barclay PA-C      cloZAPine  350 mg Oral HS Bri M Medei, CRNP      cloZAPine  50 mg Oral Daily Bri M Medei, CRNP      hydrOXYzine HCL  50 mg Oral Q6H PRN Max 4/day Taylor Castro MD      Or    diphenhydrAMINE  50 mg Intramuscular Q6H PRN Taylor Castro MD      fenofibrate  145 mg Oral Daily Zandra Barclay PA-C      fluticasone  1 spray Each Nare Daily Zandra Barclay PA-C      folic acid  1 mg Oral Daily Zandra Barclay PA-C      guaiFENesin  1,200 mg Oral Q12H Replaced by [facility] Payton Conrad PA-C      hydrocortisone   Topical 4x Daily PRN Zandra Barclay PA-C      hydrOXYzine HCL  100 mg Oral Q6H PRN Max 4/day Bri NUÑEZ Medei, CRNP      Or    LORazepam  1 mg Intramuscular Q6H PRN Bri M Medei, CRNP      hydrOXYzine HCL  25 mg Oral Q6H PRN Max 4/day Taylor Castro MD      lactulose  20 g Oral Daily PRN Zandra Barclay PA-C      lamoTRIgine  100 mg Oral BID Vadim Duncan, DO      lidocaine  1 patch Topical Daily Zandra Barclay PA-C      LORazepam  0.5 mg Oral BID Bri NUÑEZ Medei, CRNP      meloxicam  15 mg Oral Daily Zandra Barclay PA-C      methocarbamol  750 mg Oral BID Zandra Barclay PA-C      multivitamin-minerals  1 tablet Oral Daily Zandra Barclay PA-C      nicotine  1 patch Transdermal Daily Vadim Duncan, DO      nicotine polacrilex  4 mg Oral Q2H PRN Taylor Castro MD      pantoprazole  40 mg Oral Daily Before Breakfast Zandra Barclay PA-C      polyethylene glycol  17 g Oral Daily PRN Taylor Castro MD      risperiDONE  1 mg Oral Q6H PRN Max 3/day JARED Bolton      risperiDONE  2 mg Oral BID JARED Bolton      senna-docusate sodium  1  tablet Oral Daily PRN Taylor Castro MD      senna-docusate sodium  1 tablet Oral BID JARED Bolton      simethicone  80 mg Oral 4x Daily (with meals and at bedtime) Payton Conrad PA-C      thiamine  100 mg Oral Daily Zandra Barclay PA-C      traZODone  50 mg Oral HS PRN Taylor Castro MD      venlafaxine  112.5 mg Oral Daily JARED Bolton         Behavioral Health Medications: all current active meds have been reviewed. Changes as in plan section above.    Laboratory results:  I have personally reviewed all pertinent laboratory/tests results.  No results found for this or any previous visit (from the past 48 hour(s)).     Dunia Diaz DO  Psychiatry Resident, PGY-III

## 2024-04-08 PROCEDURE — 99232 SBSQ HOSP IP/OBS MODERATE 35: CPT | Performed by: HOSPITALIST

## 2024-04-08 RX ORDER — AMOXICILLIN 250 MG
1 CAPSULE ORAL 2 TIMES DAILY
Qty: 60 TABLET | Refills: 0 | Status: SHIPPED | OUTPATIENT
Start: 2024-04-08 | End: 2024-04-09

## 2024-04-08 RX ORDER — LORAZEPAM 0.5 MG/1
0.5 TABLET ORAL 2 TIMES DAILY
Qty: 28 TABLET | Refills: 0 | Status: SHIPPED | OUTPATIENT
Start: 2024-04-08 | End: 2024-04-22

## 2024-04-08 RX ORDER — FLUTICASONE PROPIONATE AND SALMETEROL XINAFOATE 115; 21 UG/1; UG/1
2 AEROSOL, METERED RESPIRATORY (INHALATION) 2 TIMES DAILY
Qty: 12 G | Refills: 0 | Status: SHIPPED | OUTPATIENT
Start: 2024-04-08 | End: 2024-04-09

## 2024-04-08 RX ORDER — NICOTINE 21 MG/24HR
1 PATCH, TRANSDERMAL 24 HOURS TRANSDERMAL DAILY
Qty: 28 PATCH | Refills: 0 | Status: SHIPPED | OUTPATIENT
Start: 2024-04-09 | End: 2024-04-09

## 2024-04-08 RX ORDER — MELOXICAM 15 MG/1
15 TABLET ORAL DAILY
Qty: 30 TABLET | Refills: 0 | Status: SHIPPED | OUTPATIENT
Start: 2024-04-08 | End: 2024-04-09

## 2024-04-08 RX ORDER — CLOZAPINE 50 MG/1
350 TABLET ORAL
Qty: 98 TABLET | Refills: 0 | Status: SHIPPED | OUTPATIENT
Start: 2024-04-08 | End: 2024-04-22

## 2024-04-08 RX ORDER — FENOFIBRATE 145 MG/1
145 TABLET, COATED ORAL DAILY
Qty: 28 TABLET | Refills: 0 | Status: SHIPPED | OUTPATIENT
Start: 2024-04-08 | End: 2024-04-09

## 2024-04-08 RX ORDER — BENZONATATE 100 MG/1
100 CAPSULE ORAL 3 TIMES DAILY
Qty: 20 CAPSULE | Refills: 0 | Status: SHIPPED | OUTPATIENT
Start: 2024-04-08 | End: 2024-04-09

## 2024-04-08 RX ORDER — FOLIC ACID 1 MG/1
1 TABLET ORAL DAILY
Qty: 30 TABLET | Refills: 0 | Status: SHIPPED | OUTPATIENT
Start: 2024-04-09 | End: 2024-04-09

## 2024-04-08 RX ORDER — VENLAFAXINE HYDROCHLORIDE 37.5 MG/1
112.5 CAPSULE, EXTENDED RELEASE ORAL DAILY
Qty: 42 CAPSULE | Refills: 0 | Status: SHIPPED | OUTPATIENT
Start: 2024-04-09 | End: 2024-04-23

## 2024-04-08 RX ORDER — LANOLIN ALCOHOL/MO/W.PET/CERES
100 CREAM (GRAM) TOPICAL DAILY
Qty: 30 TABLET | Refills: 0 | Status: SHIPPED | OUTPATIENT
Start: 2024-04-09 | End: 2024-04-09

## 2024-04-08 RX ORDER — POLYETHYLENE GLYCOL 3350 17 G/17G
17 POWDER, FOR SOLUTION ORAL DAILY
Qty: 510 G | Refills: 0 | Status: SHIPPED | OUTPATIENT
Start: 2024-04-08 | End: 2024-04-09

## 2024-04-08 RX ORDER — RISPERIDONE 2 MG/1
2 TABLET ORAL 2 TIMES DAILY
Qty: 28 TABLET | Refills: 0 | Status: SHIPPED | OUTPATIENT
Start: 2024-04-08 | End: 2024-04-22

## 2024-04-08 RX ORDER — LACTULOSE 10 G/15ML
20 SOLUTION ORAL DAILY PRN
Qty: 240 ML | Refills: 0 | Status: SHIPPED | OUTPATIENT
Start: 2024-04-08

## 2024-04-08 RX ORDER — LIDOCAINE 50 MG/G
1 PATCH TOPICAL DAILY
Qty: 30 PATCH | Refills: 0 | Status: SHIPPED | OUTPATIENT
Start: 2024-04-08 | End: 2024-04-09

## 2024-04-08 RX ORDER — LAMOTRIGINE 100 MG/1
100 TABLET ORAL 2 TIMES DAILY
Qty: 28 TABLET | Refills: 0 | Status: SHIPPED | OUTPATIENT
Start: 2024-04-08 | End: 2024-04-22

## 2024-04-08 RX ORDER — HYDROCORTISONE 10 MG/G
1 CREAM TOPICAL 4 TIMES DAILY PRN
Qty: 30 G | Refills: 0 | Status: SHIPPED | OUTPATIENT
Start: 2024-04-08 | End: 2024-04-09

## 2024-04-08 RX ORDER — FLUTICASONE PROPIONATE 50 MCG
1 SPRAY, SUSPENSION (ML) NASAL DAILY
Qty: 9.9 ML | Refills: 0 | Status: SHIPPED | OUTPATIENT
Start: 2024-04-09 | End: 2024-04-09

## 2024-04-08 RX ORDER — SIMETHICONE 80 MG
80 TABLET,CHEWABLE ORAL
Qty: 30 TABLET | Refills: 0 | Status: SHIPPED | OUTPATIENT
Start: 2024-04-08 | End: 2024-04-09

## 2024-04-08 RX ORDER — METHOCARBAMOL 500 MG/1
500 TABLET, FILM COATED ORAL 2 TIMES DAILY
Qty: 30 TABLET | Refills: 0 | Status: SHIPPED | OUTPATIENT
Start: 2024-04-08 | End: 2024-04-09

## 2024-04-08 RX ORDER — GUAIFENESIN 1200 MG/1
1200 TABLET, EXTENDED RELEASE ORAL EVERY 12 HOURS SCHEDULED
Qty: 30 TABLET | Refills: 0 | Status: SHIPPED | OUTPATIENT
Start: 2024-04-08 | End: 2024-04-09

## 2024-04-08 RX ORDER — CLOZAPINE 50 MG/1
50 TABLET ORAL DAILY
Qty: 14 TABLET | Refills: 0 | Status: SHIPPED | OUTPATIENT
Start: 2024-04-09 | End: 2024-04-23

## 2024-04-08 RX ORDER — ALBUTEROL SULFATE 90 UG/1
2 AEROSOL, METERED RESPIRATORY (INHALATION) EVERY 4 HOURS PRN
Qty: 18 G | Refills: 0 | Status: SHIPPED | OUTPATIENT
Start: 2024-04-08 | End: 2024-04-09

## 2024-04-08 RX ORDER — PANTOPRAZOLE SODIUM 40 MG/1
40 TABLET, DELAYED RELEASE ORAL
Qty: 30 TABLET | Refills: 0 | Status: SHIPPED | OUTPATIENT
Start: 2024-04-08 | End: 2024-04-09

## 2024-04-08 RX ADMIN — METHOCARBAMOL TABLETS 750 MG: 500 TABLET, COATED ORAL at 08:14

## 2024-04-08 RX ADMIN — RISPERIDONE 2 MG: 2 TABLET ORAL at 08:10

## 2024-04-08 RX ADMIN — SENNOSIDES AND DOCUSATE SODIUM 1 TABLET: 8.6; 5 TABLET ORAL at 17:13

## 2024-04-08 RX ADMIN — LACTULOSE 20 G: 20 SOLUTION ORAL at 17:13

## 2024-04-08 RX ADMIN — GUAIFENESIN 1200 MG: 600 TABLET ORAL at 08:10

## 2024-04-08 RX ADMIN — HYDROCORTISONE: 25 CREAM TOPICAL at 21:02

## 2024-04-08 RX ADMIN — PANTOPRAZOLE SODIUM 40 MG: 40 TABLET, DELAYED RELEASE ORAL at 08:10

## 2024-04-08 RX ADMIN — FENOFIBRATE 145 MG: 145 TABLET, FILM COATED ORAL at 08:09

## 2024-04-08 RX ADMIN — BUDESONIDE AND FORMOTEROL FUMARATE DIHYDRATE 2 PUFF: 160; 4.5 AEROSOL RESPIRATORY (INHALATION) at 08:15

## 2024-04-08 RX ADMIN — SIMETHICONE 80 MG: 80 TABLET, CHEWABLE ORAL at 11:37

## 2024-04-08 RX ADMIN — RISPERIDONE 2 MG: 2 TABLET ORAL at 15:28

## 2024-04-08 RX ADMIN — SENNOSIDES AND DOCUSATE SODIUM 1 TABLET: 8.6; 5 TABLET ORAL at 08:11

## 2024-04-08 RX ADMIN — LAMOTRIGINE 100 MG: 100 TABLET ORAL at 08:10

## 2024-04-08 RX ADMIN — LORAZEPAM 0.5 MG: 0.5 TABLET ORAL at 08:10

## 2024-04-08 RX ADMIN — CLOZAPINE 350 MG: 100 TABLET ORAL at 21:01

## 2024-04-08 RX ADMIN — CHOLECALCIFEROL TAB 25 MCG (1000 UNIT) 1000 UNITS: 25 TAB at 08:10

## 2024-04-08 RX ADMIN — RISPERIDONE 1 MG: 1 TABLET, ORALLY DISINTEGRATING ORAL at 11:37

## 2024-04-08 RX ADMIN — SIMETHICONE 80 MG: 80 TABLET, CHEWABLE ORAL at 08:10

## 2024-04-08 RX ADMIN — MELOXICAM 15 MG: 7.5 TABLET ORAL at 08:10

## 2024-04-08 RX ADMIN — BENZONATATE 100 MG: 100 CAPSULE ORAL at 21:01

## 2024-04-08 RX ADMIN — LORAZEPAM 0.5 MG: 0.5 TABLET ORAL at 15:27

## 2024-04-08 RX ADMIN — SIMETHICONE 80 MG: 80 TABLET, CHEWABLE ORAL at 15:28

## 2024-04-08 RX ADMIN — GUAIFENESIN 1200 MG: 600 TABLET ORAL at 21:01

## 2024-04-08 RX ADMIN — BENZONATATE 100 MG: 100 CAPSULE ORAL at 15:27

## 2024-04-08 RX ADMIN — FOLIC ACID 1 MG: 1 TABLET ORAL at 08:10

## 2024-04-08 RX ADMIN — LAMOTRIGINE 100 MG: 100 TABLET ORAL at 17:13

## 2024-04-08 RX ADMIN — NICOTINE 1 PATCH: 21 PATCH, EXTENDED RELEASE TRANSDERMAL at 10:26

## 2024-04-08 RX ADMIN — LIDOCAINE 5% 1 PATCH: 700 PATCH TOPICAL at 10:26

## 2024-04-08 RX ADMIN — VENLAFAXINE HYDROCHLORIDE 112.5 MG: 75 CAPSULE, EXTENDED RELEASE ORAL at 08:10

## 2024-04-08 RX ADMIN — CLOZAPINE 50 MG: 25 TABLET ORAL at 08:10

## 2024-04-08 RX ADMIN — THIAMINE HCL TAB 100 MG 100 MG: 100 TAB at 08:10

## 2024-04-08 RX ADMIN — BUDESONIDE AND FORMOTEROL FUMARATE DIHYDRATE 2 PUFF: 160; 4.5 AEROSOL RESPIRATORY (INHALATION) at 17:14

## 2024-04-08 RX ADMIN — Medication 1 TABLET: at 08:10

## 2024-04-08 RX ADMIN — BENZONATATE 100 MG: 100 CAPSULE ORAL at 08:11

## 2024-04-08 RX ADMIN — FLUTICASONE PROPIONATE 1 SPRAY: 50 SPRAY, METERED NASAL at 08:15

## 2024-04-08 RX ADMIN — METHOCARBAMOL TABLETS 750 MG: 500 TABLET, COATED ORAL at 17:13

## 2024-04-08 NOTE — DISCHARGE INSTR - AVS FIRST PAGE
You were prescribed a 2-week supply of psychotropic medications upon discharge.  Please continue taking Senna-S 1 tablet twice daily to prevent constipation associated with Clozaril therapy  Last Clozaril level drawn 4/4/24 and therapeutic at 867  Please continue weekly CBDC while on Clozaril therapy upon discharge

## 2024-04-08 NOTE — SOCIAL WORK
Ramos planning email sent to [email or web address], Anuja Segundo <[email or web address]>

## 2024-04-08 NOTE — SOCIAL WORK
Americo (ACT Team) [phone] contacted via  to confirm Wed dc due to medications needing to be sent to pharm.

## 2024-04-08 NOTE — SOCIAL WORK
Americo (ACT Team) [phone] contacted regarding dc tomorrow. Progress update, Americo will talk with his team about added support for dc tomorrow. Dandre will be visiting pt tomorrow. Americo agrees pt is close to baseline.     [facility]: [phone] (2 days to be shipped) sent to ACT team for nursing to package them. ACT team requesting dc Wed to accommodate meds for safe dc planning. Providers notified. Americo will also contact nurse Dandre to confirm pharmacy, contact  Americo to provide pt progress update. Call ended mutually.

## 2024-04-08 NOTE — NURSING NOTE
Patient observed in the milieu briefly, she continues to brighten on approach, and report pleasant mood despite appearing anxious when alone. She reports it is day 3 of not having hallucinations although reported to have been responding to internal stimulus on previous shift. She denies SI/HI and hallucinations. Requested prune juice for constipation but admits to her last BM being today. Compliant with medication. Q7 minute checks ongoing.

## 2024-04-08 NOTE — PLAN OF CARE
Problem: Risk for Self Injury/Neglect  Goal: Refrain from harming self  Description: Interventions:  - Monitor patient closely, per order  - Develop a trusting relationship  - Supervise medication ingestion, monitor effects and side effects   Outcome: Progressing     Problem: Depression  Goal: Refrain from self-neglect  Outcome: Progressing

## 2024-04-08 NOTE — PROGRESS NOTES
04/08/24    Team Meeting   Meeting Type Daily Rounds   Team Members Present   Team Members Present Physician;Nurse;Occupational Therapist;   Physician Team Member Dr Ronald LARSON; Yair COLEMAN   Nursing Team Member Agustin JIMENES   Social Work Team Member Abelardo HOPSON   OT Team Member Gurjit HUITRON   Patient/Family Present   Patient Present No   Patient's Family Present No     Appears internally preoccupied, dc focused, improving, denies all psych symptoms, appears more relaxed, improving, ACT visit today 1130am, dc tomorrow.

## 2024-04-08 NOTE — NURSING NOTE
Patient c/o hearing AH after increased unit stimulation/yelling by a peer. Patient requested PRN Risperdal, patient resting in bed now. Medication effective. Q 7 minute checks maintained.

## 2024-04-08 NOTE — NURSING NOTE
"Patient visible on the unit, social with select peers. Brightens on approach, pleasant & cooperative with staff. Denies SI/HI. Patient endorses intermittent AH throughout the day but reports \"feeling better\". Patient looking forward to discharge. Taking medications as prescribed, goes to groups & meals in the dayroom. Patient c/o constipation, received PRN Lactulose.Q 7 minute checks maintained.  "

## 2024-04-08 NOTE — PROGRESS NOTES
Progress Note - Jo-Ann Lamb 53 y.o. female MRN: [id number]    Unit/Bed#: [facility] 254-01 Encounter: [id number]        Subjective:   Patient seen and examined at bedside after reviewing the chart and discussing the case with the caring staff.      Patient examined at bedside.  Patient has no acute symptoms.     Patient is being discharged Wednesday, 4/10/24.     Physical Exam   Vitals: Blood pressure 119/79, pulse 96, temperature 97.8 °F (36.6 °C), temperature source Temporal, resp. rate 16, height 5' (1.524 m), weight 65.6 kg (144 lb 9.6 oz), last menstrual period 01/01/2020, SpO2 96%, not currently breastfeeding.,Body mass index is 28.24 kg/m².  Constitutional: Patient in no acute distress.  HEENT: PERR, EOMI, MMM.  Cardiovascular: Normal rate and regular rhythm.    Pulmonary/Chest: Effort normal and breath sounds normal.   Abdomen: Soft, + BS, NT, no rebound, no guarding, no rigidity.     Assessment/Plan:  Jo-Ann Lamb is a(n) 53 y.o. year old female with schizoaffective disorder.    Medical Clearance: Patient is medically cleared for discharge. All prescriptions have been sent to pharmacy.      Asthma.  Patient on Symbicort inhaler twice daily and albuterol as needed.   Hypertriglyceridemia.  Patient on Fenofibrate 145 mg daily. Lipid panel 3/23/24: cholesterol 221, triglycerides 211, . Patient declines statin at this time and would like to f/up with PCP outpt.   Allergic rhinitis.  Patient on Flonase daily.   GERD. Patient on Protonix 40 mg daily.   Tobacco abuse. NRT.   Alcohol abuse.  Patient started on thiamine 100 mg, folic acid 1 mg, and multivitamin daily.   Chronic back pain.  Patient on Mobic 15 mg daily, Robaxin 500 mg twice daily, and lidocaine patch daily. Increase Robaxin to 750 mg twice daily 4/5.  Vitamin D deficiency.  Continue daily supplement.   Cough/congestion.  Increase Mucinex to 1200 mg twice daily 3/30.  Tessalon pearls as needed.    Callus of R great toe.  Encouraged pt to f/u with  podiatry outpatient.    Constipation/hemorrhoids/flatulence.  On senokot S twice daily.  Miralax and lactulose as needed.  Apply Anusol daily.  Add simethicone 80 mg QID.    Vaginal candidiasis.  Diflucan 150 mg x 1 dose 3/30.    Overactive bladder.  Would avoid ditropan at this time due to possible worsening constipation with Clozaril.  Recommended pelvic floor muscle exercises.  Patient encouraged to follow-up with PCP/gyn on outpatient basis.   Routine STD testing. RPR, HIV, Hep C Ab, Hep B surface Ag, Chlamydia/GC all negative. HSV pending.    The patient was discussed with Dr. Steel and he is in agreement with the above note.

## 2024-04-08 NOTE — PROGRESS NOTES
"Progress Note - Behavioral Health   Jo-Ann Lamb 53 y.o. female MRN: [id number]  Unit/Bed#: [facility] 254-01 Encounter: [id number]    Assessment/Plan   Principal Problem:    Schizoaffective disorder, bipolar type (HCC)      Behavior over the last 24 hours:  improved  Sleep: normal  Appetite: normal  Medication side effects: No  ROS: no complaints and all other systems are negative    Jo-Ann remains visible and participatory in milieu activities.  Reports resolution of AVH this morning.  Describes mood as \"good\" and feeling better; presents with congruent affect and appears less anxious.  Has been maintaining safety on unit with no return of SI.  Denies any sleep disturbance and remains compliant with medications, meals, and ADLs.  Identifies adequate safety plan and protective factors against suicide.  Verbalizes readiness for discharge.  Has only used PRN Risperdal x 1 over the weekend.     Mental Status Evaluation:  Appearance:  age appropriate and casually dressed   Behavior:  Cooperative, calm   Speech:  normal pitch and normal volume   Mood:  \"Good\"   Affect:  mood-congruent and less anxious   Thought Process:  goal directed, less illogical   Associations: circumstantial associations   Thought Content:  Less paranoid   Perceptual Disturbances: Denies current AVH, did not appear preoccupied   Risk Potential: Suicidal Ideations none  Homicidal Ideations none  Potential for Aggression No   Sensorium:  person, place, time/date, and situation   Memory:  recent and remote memory grossly intact   Consciousness:  alert and awake    Attention: attention span and concentration were age appropriate   Insight:  Improving   Judgment: Improving   Gait/Station: normal gait/station and normal balance   Motor Activity: no abnormal movements     Progress Toward Goals: Improving.  Utilization of PRN medications decreased.  Has been maintaining safety and mood control.  Reports decreased intensity and frequency of AH and denies today.  ACT " reports patient is presenting at her psychiatric baseline.  Since patient appears to be at her psychiatric baseline, plan is to begin discharge preparations.  Medications submitted to patient's outpatient pharmacy today to prepare for discharge Wednesday, 4/10/2024.    Recommended Treatment: Continue with group therapy, milieu therapy and occupational therapy.      Risks, benefits and possible side effects of Medications:   Risks, benefits, and possible side effects of medications explained to patient and patient verbalizes understanding.      Medications: all current active meds have been reviewed, continue current psychiatric medications, and current meds:   Current Facility-Administered Medications   Medication Dose Route Frequency    acetaminophen (TYLENOL) tablet 650 mg  650 mg Oral Q6H PRN    acetaminophen (TYLENOL) tablet 650 mg  650 mg Oral Q4H PRN    acetaminophen (TYLENOL) tablet 975 mg  975 mg Oral Q6H PRN    albuterol (PROVENTIL HFA,VENTOLIN HFA) inhaler 2 puff  2 puff Inhalation Q4H PRN    aluminum-magnesium hydroxide-simethicone (MAALOX) oral suspension 30 mL  30 mL Oral Q4H PRN    benzonatate (TESSALON PERLES) capsule 100 mg  100 mg Oral TID    benztropine (COGENTIN) tablet 1 mg  1 mg Oral Q4H PRN Max 6/day    bisacodyl (DULCOLAX) rectal suppository 10 mg  10 mg Rectal Daily PRN    budesonide-formoterol (SYMBICORT) 160-4.5 mcg/act inhaler 2 puff  2 puff Inhalation BID    Cholecalciferol (VITAMIN D3) tablet 1,000 Units  1,000 Units Oral QAM    cloZAPine (CLOZARIL) tablet 350 mg  350 mg Oral HS    cloZAPine (CLOZARIL) tablet 50 mg  50 mg Oral Daily    hydrOXYzine HCL (ATARAX) tablet 50 mg  50 mg Oral Q6H PRN Max 4/day    Or    diphenhydrAMINE (BENADRYL) injection 50 mg  50 mg Intramuscular Q6H PRN    fenofibrate (TRICOR) tablet 145 mg  145 mg Oral Daily    fluticasone (FLONASE) 50 mcg/act nasal spray 1 spray  1 spray Each Nare Daily    folic acid (FOLVITE) tablet 1 mg  1 mg Oral Daily    guaiFENesin  (MUCINEX) 12 hr tablet 1,200 mg  1,200 mg Oral Q12H KENNY    hydrocortisone (ANUSOL-HC) 2.5 % rectal cream   Topical 4x Daily PRN    hydrOXYzine HCL (ATARAX) tablet 100 mg  100 mg Oral Q6H PRN Max 4/day    Or    LORazepam (ATIVAN) injection 1 mg  1 mg Intramuscular Q6H PRN    hydrOXYzine HCL (ATARAX) tablet 25 mg  25 mg Oral Q6H PRN Max 4/day    lactulose (CHRONULAC) oral solution 20 g  20 g Oral Daily PRN    lamoTRIgine (LaMICtal) tablet 100 mg  100 mg Oral BID    lidocaine (LIDODERM) 5 % patch 1 patch  1 patch Topical Daily    LORazepam (ATIVAN) tablet 0.5 mg  0.5 mg Oral BID    meloxicam (MOBIC) tablet 15 mg  15 mg Oral Daily    methocarbamol (ROBAXIN) tablet 750 mg  750 mg Oral BID    multivitamin-minerals (CENTRUM) tablet 1 tablet  1 tablet Oral Daily    nicotine (NICODERM CQ) 21 mg/24 hr TD 24 hr patch 1 patch  1 patch Transdermal Daily    nicotine polacrilex (NICORETTE) gum 4 mg  4 mg Oral Q2H PRN    pantoprazole (PROTONIX) EC tablet 40 mg  40 mg Oral Daily Before Breakfast    polyethylene glycol (MIRALAX) packet 17 g  17 g Oral Daily PRN    risperiDONE (RisperDAL M-TAB) disintegrating tablet 1 mg  1 mg Oral Q6H PRN Max 3/day    risperiDONE (RisperDAL) tablet 2 mg  2 mg Oral BID    senna-docusate sodium (SENOKOT S) 8.6-50 mg per tablet 1 tablet  1 tablet Oral Daily PRN    senna-docusate sodium (SENOKOT S) 8.6-50 mg per tablet 1 tablet  1 tablet Oral BID    simethicone (MYLICON) chewable tablet 80 mg  80 mg Oral 4x Daily (with meals and at bedtime)    thiamine tablet 100 mg  100 mg Oral Daily    traZODone (DESYREL) tablet 50 mg  50 mg Oral HS PRN    venlafaxine (EFFEXOR-XR) 24 hr capsule 112.5 mg  112.5 mg Oral Daily   .    Labs: I have personally reviewed all pertinent laboratory/tests results. CBC:   Lab Results   Component Value Date    WBC 8.90 04/04/2024    RBC 4.80 04/04/2024    HGB 14.1 04/04/2024    HCT 44.4 04/04/2024    MCV 93 04/04/2024     04/04/2024    MCH 29.4 04/04/2024    MCHC 31.8  04/04/2024    RDW 14.0 04/04/2024    MPV 9.9 04/04/2024    NEUTROABS 5.54 04/04/2024     CMP:   Lab Results   Component Value Date    SODIUM 137 03/23/2024    K 4.3 03/23/2024     03/23/2024    CO2 28 03/23/2024    AGAP 8 03/23/2024    BUN 6 03/23/2024    CREATININE 0.56 (L) 03/23/2024    GLUC 88 03/23/2024    GLUF 88 03/23/2024    CALCIUM 8.9 03/23/2024    AST 13 03/23/2024    ALT 10 03/23/2024    ALKPHOS 76 03/23/2024    TP 6.2 (L) 03/23/2024    ALB 3.8 03/23/2024    TBILI 0.18 (L) 03/23/2024    EGFR 107 03/23/2024     Clozapine:   Lab Results   Component Value Date    CLOZAPINE 867 04/04/2024    NCLOZIP 241 11/29/2023     EKG   Lab Results   Component Value Date    VENTRATE 68 03/23/2024    ATRIALRATE 68 03/23/2024    PRINT 152 03/23/2024    QRSDINT 68 03/23/2024    QTINT 440 03/23/2024    QTCINT 467 03/23/2024    PAXIS 58 03/23/2024    QRSAXIS 3 03/23/2024    TWAVEAXIS 50 03/23/2024       Counseling / Coordination of Care  Total floor / unit time spent today 30 minutes. Greater than 50% of total time was spent with the patient and / or family counseling and / or coordination of care. A description of the counseling / coordination of care: Medication education, treatment plan, safety/discharge planning

## 2024-04-09 PROCEDURE — 99232 SBSQ HOSP IP/OBS MODERATE 35: CPT | Performed by: NURSE PRACTITIONER

## 2024-04-09 RX ORDER — METHOCARBAMOL 500 MG/1
500 TABLET, FILM COATED ORAL 2 TIMES DAILY
Qty: 30 TABLET | Refills: 0 | Status: SHIPPED | OUTPATIENT
Start: 2024-04-09

## 2024-04-09 RX ORDER — FOLIC ACID 1 MG/1
1 TABLET ORAL DAILY
Qty: 30 TABLET | Refills: 0 | Status: SHIPPED | OUTPATIENT
Start: 2024-04-09

## 2024-04-09 RX ORDER — GUAIFENESIN 1200 MG/1
1200 TABLET, EXTENDED RELEASE ORAL EVERY 12 HOURS SCHEDULED
Qty: 30 TABLET | Refills: 0 | Status: SHIPPED | OUTPATIENT
Start: 2024-04-09

## 2024-04-09 RX ORDER — HYDROCORTISONE 10 MG/G
1 CREAM TOPICAL 4 TIMES DAILY PRN
Qty: 30 G | Refills: 0 | Status: SHIPPED | OUTPATIENT
Start: 2024-04-09

## 2024-04-09 RX ORDER — ALBUTEROL SULFATE 90 UG/1
2 AEROSOL, METERED RESPIRATORY (INHALATION) EVERY 4 HOURS PRN
Qty: 18 G | Refills: 0 | Status: SHIPPED | OUTPATIENT
Start: 2024-04-09

## 2024-04-09 RX ORDER — SIMETHICONE 80 MG
80 TABLET,CHEWABLE ORAL
Qty: 30 TABLET | Refills: 0 | Status: SHIPPED | OUTPATIENT
Start: 2024-04-09

## 2024-04-09 RX ORDER — LIDOCAINE 50 MG/G
1 PATCH TOPICAL DAILY
Qty: 30 PATCH | Refills: 0 | Status: SHIPPED | OUTPATIENT
Start: 2024-04-09

## 2024-04-09 RX ORDER — FLUTICASONE PROPIONATE 50 MCG
1 SPRAY, SUSPENSION (ML) NASAL DAILY
Qty: 9.9 ML | Refills: 0 | Status: SHIPPED | OUTPATIENT
Start: 2024-04-09

## 2024-04-09 RX ORDER — BENZONATATE 100 MG/1
100 CAPSULE ORAL 3 TIMES DAILY
Qty: 30 CAPSULE | Refills: 0 | Status: SHIPPED | OUTPATIENT
Start: 2024-04-09

## 2024-04-09 RX ORDER — LANOLIN ALCOHOL/MO/W.PET/CERES
100 CREAM (GRAM) TOPICAL DAILY
Qty: 30 TABLET | Refills: 0 | Status: SHIPPED | OUTPATIENT
Start: 2024-04-09

## 2024-04-09 RX ORDER — FENOFIBRATE 145 MG/1
145 TABLET, COATED ORAL DAILY
Qty: 28 TABLET | Refills: 0 | Status: SHIPPED | OUTPATIENT
Start: 2024-04-09

## 2024-04-09 RX ORDER — NICOTINE 21 MG/24HR
1 PATCH, TRANSDERMAL 24 HOURS TRANSDERMAL DAILY
Qty: 28 PATCH | Refills: 0 | Status: SHIPPED | OUTPATIENT
Start: 2024-04-09

## 2024-04-09 RX ORDER — POLYETHYLENE GLYCOL 3350 17 G/17G
17 POWDER, FOR SOLUTION ORAL DAILY
Qty: 510 G | Refills: 0 | Status: SHIPPED | OUTPATIENT
Start: 2024-04-09

## 2024-04-09 RX ORDER — AMOXICILLIN 250 MG
1 CAPSULE ORAL 2 TIMES DAILY
Qty: 60 TABLET | Refills: 0 | Status: SHIPPED | OUTPATIENT
Start: 2024-04-09 | End: 2024-05-09

## 2024-04-09 RX ORDER — FLUTICASONE PROPIONATE AND SALMETEROL XINAFOATE 115; 21 UG/1; UG/1
2 AEROSOL, METERED RESPIRATORY (INHALATION) 2 TIMES DAILY
Qty: 12 G | Refills: 0 | Status: SHIPPED | OUTPATIENT
Start: 2024-04-09

## 2024-04-09 RX ORDER — MELOXICAM 15 MG/1
15 TABLET ORAL DAILY
Qty: 30 TABLET | Refills: 0 | Status: SHIPPED | OUTPATIENT
Start: 2024-04-09

## 2024-04-09 RX ORDER — PANTOPRAZOLE SODIUM 40 MG/1
40 TABLET, DELAYED RELEASE ORAL
Qty: 30 TABLET | Refills: 0 | Status: SHIPPED | OUTPATIENT
Start: 2024-04-09 | End: 2024-05-09

## 2024-04-09 RX ADMIN — BUDESONIDE AND FORMOTEROL FUMARATE DIHYDRATE 2 PUFF: 160; 4.5 AEROSOL RESPIRATORY (INHALATION) at 08:19

## 2024-04-09 RX ADMIN — THIAMINE HCL TAB 100 MG 100 MG: 100 TAB at 08:20

## 2024-04-09 RX ADMIN — GUAIFENESIN 1200 MG: 600 TABLET ORAL at 08:20

## 2024-04-09 RX ADMIN — PANTOPRAZOLE SODIUM 40 MG: 40 TABLET, DELAYED RELEASE ORAL at 08:19

## 2024-04-09 RX ADMIN — VENLAFAXINE HYDROCHLORIDE 112.5 MG: 75 CAPSULE, EXTENDED RELEASE ORAL at 08:21

## 2024-04-09 RX ADMIN — SENNOSIDES AND DOCUSATE SODIUM 1 TABLET: 8.6; 5 TABLET ORAL at 17:17

## 2024-04-09 RX ADMIN — RISPERIDONE 2 MG: 2 TABLET ORAL at 15:01

## 2024-04-09 RX ADMIN — HYDROCORTISONE: 25 CREAM TOPICAL at 09:11

## 2024-04-09 RX ADMIN — NICOTINE 1 PATCH: 21 PATCH, EXTENDED RELEASE TRANSDERMAL at 09:10

## 2024-04-09 RX ADMIN — LIDOCAINE 5% 1 PATCH: 700 PATCH TOPICAL at 09:09

## 2024-04-09 RX ADMIN — HYDROXYZINE HYDROCHLORIDE 100 MG: 50 TABLET, FILM COATED ORAL at 20:18

## 2024-04-09 RX ADMIN — SIMETHICONE 80 MG: 80 TABLET, CHEWABLE ORAL at 15:01

## 2024-04-09 RX ADMIN — LORAZEPAM 0.5 MG: 0.5 TABLET ORAL at 15:01

## 2024-04-09 RX ADMIN — SIMETHICONE 80 MG: 80 TABLET, CHEWABLE ORAL at 11:58

## 2024-04-09 RX ADMIN — FENOFIBRATE 145 MG: 145 TABLET, FILM COATED ORAL at 08:20

## 2024-04-09 RX ADMIN — LORAZEPAM 0.5 MG: 0.5 TABLET ORAL at 08:20

## 2024-04-09 RX ADMIN — BENZONATATE 100 MG: 100 CAPSULE ORAL at 08:21

## 2024-04-09 RX ADMIN — SENNOSIDES AND DOCUSATE SODIUM 1 TABLET: 8.6; 5 TABLET ORAL at 08:21

## 2024-04-09 RX ADMIN — SIMETHICONE 80 MG: 80 TABLET, CHEWABLE ORAL at 08:20

## 2024-04-09 RX ADMIN — FOLIC ACID 1 MG: 1 TABLET ORAL at 08:20

## 2024-04-09 RX ADMIN — FLUTICASONE PROPIONATE 1 SPRAY: 50 SPRAY, METERED NASAL at 08:19

## 2024-04-09 RX ADMIN — METHOCARBAMOL TABLETS 750 MG: 500 TABLET, COATED ORAL at 08:19

## 2024-04-09 RX ADMIN — BENZONATATE 100 MG: 100 CAPSULE ORAL at 15:01

## 2024-04-09 RX ADMIN — LAMOTRIGINE 100 MG: 100 TABLET ORAL at 17:12

## 2024-04-09 RX ADMIN — CLOZAPINE 350 MG: 100 TABLET ORAL at 20:15

## 2024-04-09 RX ADMIN — BENZONATATE 100 MG: 100 CAPSULE ORAL at 20:15

## 2024-04-09 RX ADMIN — SIMETHICONE 80 MG: 80 TABLET, CHEWABLE ORAL at 20:16

## 2024-04-09 RX ADMIN — BUDESONIDE AND FORMOTEROL FUMARATE DIHYDRATE 2 PUFF: 160; 4.5 AEROSOL RESPIRATORY (INHALATION) at 17:13

## 2024-04-09 RX ADMIN — CHOLECALCIFEROL TAB 25 MCG (1000 UNIT) 1000 UNITS: 25 TAB at 08:19

## 2024-04-09 RX ADMIN — METHOCARBAMOL TABLETS 750 MG: 500 TABLET, COATED ORAL at 17:12

## 2024-04-09 RX ADMIN — RISPERIDONE 2 MG: 2 TABLET ORAL at 08:21

## 2024-04-09 RX ADMIN — LAMOTRIGINE 100 MG: 100 TABLET ORAL at 08:20

## 2024-04-09 RX ADMIN — Medication 1 TABLET: at 08:20

## 2024-04-09 RX ADMIN — MELOXICAM 15 MG: 7.5 TABLET ORAL at 08:21

## 2024-04-09 RX ADMIN — CLOZAPINE 50 MG: 25 TABLET ORAL at 08:20

## 2024-04-09 RX ADMIN — GUAIFENESIN 1200 MG: 600 TABLET ORAL at 20:16

## 2024-04-09 NOTE — PROGRESS NOTES
Progress Note - Jo-Ann Lamb 53 y.o. female MRN: [id number]    Unit/Bed#: [facility] 254-01 Encounter: [id number]        Subjective:   Patient seen and examined at bedside after reviewing the chart and discussing the case with the caring staff.      Patient examined at bedside.  Patient has no acute symptoms.     Patient is being discharged tomorrow, 4/10/24.     Physical Exam   Vitals: Blood pressure 90/53, pulse 93, temperature 98.1 °F (36.7 °C), temperature source Temporal, resp. rate 16, height 5' (1.524 m), weight 65.6 kg (144 lb 9.6 oz), last menstrual period 01/01/2020, SpO2 97%, not currently breastfeeding.,Body mass index is 28.24 kg/m².  Constitutional: Patient in no acute distress.  HEENT: PERR, EOMI, MMM.  Cardiovascular: Normal rate and regular rhythm.    Pulmonary/Chest: Effort normal and breath sounds normal.   Abdomen: Soft, + BS, NT, no rebound, no guarding, no rigidity.     Assessment/Plan:  Jo-Ann Lamb is a(n) 53 y.o. year old female with schizoaffective disorder.    Medical Clearance: Patient is medically cleared for discharge. All prescriptions have been sent to pharmacy.      Asthma.  Patient on Symbicort inhaler twice daily and albuterol as needed.   Hypertriglyceridemia.  Patient on Fenofibrate 145 mg daily. Lipid panel 3/23/24: cholesterol 221, triglycerides 211, . Patient declines statin at this time and would like to f/up with PCP outpt.   Allergic rhinitis.  Patient on Flonase daily.   GERD. Patient on Protonix 40 mg daily.   Tobacco abuse. NRT.   Alcohol abuse.  Patient started on thiamine 100 mg, folic acid 1 mg, and multivitamin daily.   Chronic back pain.  Patient on Mobic 15 mg daily, Robaxin 500 mg twice daily, and lidocaine patch daily. Increase Robaxin to 750 mg twice daily 4/5.  Vitamin D deficiency.  Continue daily supplement.   Cough/congestion.  Increase Mucinex to 1200 mg twice daily 3/30.  Tessalon pearls as needed.    Callus of R great toe.  Encouraged pt to f/u with  podiatry outpatient.    Constipation/hemorrhoids/flatulence.  On senokot S twice daily.  Miralax and lactulose as needed.  Apply Anusol daily.  Add simethicone 80 mg QID.    Vaginal candidiasis.  Diflucan 150 mg x 1 dose 3/30.    Overactive bladder.  Would avoid ditropan at this time due to possible worsening constipation with Clozaril.  Recommended pelvic floor muscle exercises.  Patient encouraged to follow-up with PCP/gyn on outpatient basis.   Routine STD testing. RPR, HIV, Hep C Ab, Hep B surface Ag, Chlamydia/GC all negative. HSV pending.    The patient was discussed with Dr. Steel and he is in agreement with the above note.

## 2024-04-09 NOTE — SOCIAL WORK
"Sw met with pt to review dc plan. Pt agreeable to dc tomorrow-time pending  by ACT team or residential program availability. Pt denies current SI/HI/VH/dep/anx, states AH are \"ok\" today.   "

## 2024-04-09 NOTE — SOCIAL WORK
ANDERS LYNCH Dandre [phone] contact returned to confirm dc plan/date tomorrow, confirmed meds were received by pharm.

## 2024-04-09 NOTE — PROGRESS NOTES
"Progress Note - Behavioral Health   Jo-Ann Lamb 53 y.o. female MRN: [id number]  Unit/Bed#: [facility] 254-01 Encounter: [id number]    Assessment/Plan   Principal Problem:    Schizoaffective disorder, bipolar type (HCC)      Behavior over the last 24 hours:  improved  Sleep: normal  Appetite: normal  Medication side effects: No  ROS: no complaints and all other systems are negative    Jo-Ann remains visible and participatory in unit activities.  Able to maintain mood control and safety with no return of SI.  Reports fleeting AH that are noncommanding in nature and described as baseline.  Compliant with medications and meals and sleeping undisturbed throughout the night.  Tending to ADLs appropriately.  Identifies adequate safety plan and protective factors against suicide.  Utilization of PRN medications significantly decreased.    Mental Status Evaluation:  Appearance:  age appropriate, casually dressed, and blonde hair   Behavior:  Cooperative, calm, good eye contact   Speech:  normal pitch and normal volume   Mood:  \"Good\"   Affect:  mood-congruent   Thought Process:  goal directed   Associations: circumstantial associations   Thought Content:  Bizarre and paranoid delusions less overt, described as chronic and baseline in nature   Perceptual Disturbances: Denied current AVH, reports that is fleeting and noncommanding in nature   Risk Potential: Suicidal Ideations none  Homicidal Ideations none  Potential for Aggression No   Sensorium:  person, place, time/date, and situation   Memory:  recent and remote memory grossly intact   Consciousness:  alert and awake    Attention: attention span and concentration were age appropriate   Insight:  Improving   Judgment: Improving   Gait/Station: normal gait/station and normal balance   Motor Activity: no abnormal movements     Progress Toward Goals: Improving.  Continues to maintain mood control and safety.  Patient appears to be at her psychiatric baseline and verbalizes readiness " for discharge; confirmed by ACT.  Utilizing 2 antipsychotics for augmentation of Clozaril therapy and history of multiple failed monotherapy trials.  Continue with current psychotropic regimen and plan for discharge tomorrow, 4/10/2024, with outpatient psychiatric follow-up.    Recommended Treatment: Continue with group therapy, milieu therapy and occupational therapy.      Risks, benefits and possible side effects of Medications:   Risks, benefits, and possible side effects of medications explained to patient and patient verbalizes understanding.      Medications: all current active meds have been reviewed, continue current psychiatric medications, and current meds:   Current Facility-Administered Medications   Medication Dose Route Frequency    acetaminophen (TYLENOL) tablet 650 mg  650 mg Oral Q6H PRN    acetaminophen (TYLENOL) tablet 650 mg  650 mg Oral Q4H PRN    acetaminophen (TYLENOL) tablet 975 mg  975 mg Oral Q6H PRN    albuterol (PROVENTIL HFA,VENTOLIN HFA) inhaler 2 puff  2 puff Inhalation Q4H PRN    aluminum-magnesium hydroxide-simethicone (MAALOX) oral suspension 30 mL  30 mL Oral Q4H PRN    benzonatate (TESSALON PERLES) capsule 100 mg  100 mg Oral TID    benztropine (COGENTIN) tablet 1 mg  1 mg Oral Q4H PRN Max 6/day    bisacodyl (DULCOLAX) rectal suppository 10 mg  10 mg Rectal Daily PRN    budesonide-formoterol (SYMBICORT) 160-4.5 mcg/act inhaler 2 puff  2 puff Inhalation BID    Cholecalciferol (VITAMIN D3) tablet 1,000 Units  1,000 Units Oral QAM    cloZAPine (CLOZARIL) tablet 350 mg  350 mg Oral HS    cloZAPine (CLOZARIL) tablet 50 mg  50 mg Oral Daily    hydrOXYzine HCL (ATARAX) tablet 50 mg  50 mg Oral Q6H PRN Max 4/day    Or    diphenhydrAMINE (BENADRYL) injection 50 mg  50 mg Intramuscular Q6H PRN    fenofibrate (TRICOR) tablet 145 mg  145 mg Oral Daily    fluticasone (FLONASE) 50 mcg/act nasal spray 1 spray  1 spray Each Nare Daily    folic acid (FOLVITE) tablet 1 mg  1 mg Oral Daily     guaiFENesin (MUCINEX) 12 hr tablet 1,200 mg  1,200 mg Oral Q12H KENNY    hydrocortisone (ANUSOL-HC) 2.5 % rectal cream   Topical 4x Daily PRN    hydrOXYzine HCL (ATARAX) tablet 100 mg  100 mg Oral Q6H PRN Max 4/day    Or    LORazepam (ATIVAN) injection 1 mg  1 mg Intramuscular Q6H PRN    hydrOXYzine HCL (ATARAX) tablet 25 mg  25 mg Oral Q6H PRN Max 4/day    lactulose (CHRONULAC) oral solution 20 g  20 g Oral Daily PRN    lamoTRIgine (LaMICtal) tablet 100 mg  100 mg Oral BID    lidocaine (LIDODERM) 5 % patch 1 patch  1 patch Topical Daily    LORazepam (ATIVAN) tablet 0.5 mg  0.5 mg Oral BID    meloxicam (MOBIC) tablet 15 mg  15 mg Oral Daily    methocarbamol (ROBAXIN) tablet 750 mg  750 mg Oral BID    multivitamin-minerals (CENTRUM) tablet 1 tablet  1 tablet Oral Daily    nicotine (NICODERM CQ) 21 mg/24 hr TD 24 hr patch 1 patch  1 patch Transdermal Daily    nicotine polacrilex (NICORETTE) gum 4 mg  4 mg Oral Q2H PRN    pantoprazole (PROTONIX) EC tablet 40 mg  40 mg Oral Daily Before Breakfast    polyethylene glycol (MIRALAX) packet 17 g  17 g Oral Daily PRN    risperiDONE (RisperDAL M-TAB) disintegrating tablet 1 mg  1 mg Oral Q6H PRN Max 3/day    risperiDONE (RisperDAL) tablet 2 mg  2 mg Oral BID    senna-docusate sodium (SENOKOT S) 8.6-50 mg per tablet 1 tablet  1 tablet Oral Daily PRN    senna-docusate sodium (SENOKOT S) 8.6-50 mg per tablet 1 tablet  1 tablet Oral BID    simethicone (MYLICON) chewable tablet 80 mg  80 mg Oral 4x Daily (with meals and at bedtime)    thiamine tablet 100 mg  100 mg Oral Daily    traZODone (DESYREL) tablet 50 mg  50 mg Oral HS PRN    venlafaxine (EFFEXOR-XR) 24 hr capsule 112.5 mg  112.5 mg Oral Daily   .    Labs: I have personally reviewed all pertinent laboratory/tests results. CBC:   Lab Results   Component Value Date    WBC 8.90 04/04/2024    RBC 4.80 04/04/2024    HGB 14.1 04/04/2024    HCT 44.4 04/04/2024    MCV 93 04/04/2024     04/04/2024    MCH 29.4 04/04/2024    MCHC  31.8 04/04/2024    RDW 14.0 04/04/2024    MPV 9.9 04/04/2024    NEUTROABS 5.54 04/04/2024     CMP:   Lab Results   Component Value Date    SODIUM 137 03/23/2024    K 4.3 03/23/2024     03/23/2024    CO2 28 03/23/2024    AGAP 8 03/23/2024    BUN 6 03/23/2024    CREATININE 0.56 (L) 03/23/2024    GLUC 88 03/23/2024    GLUF 88 03/23/2024    CALCIUM 8.9 03/23/2024    AST 13 03/23/2024    ALT 10 03/23/2024    ALKPHOS 76 03/23/2024    TP 6.2 (L) 03/23/2024    ALB 3.8 03/23/2024    TBILI 0.18 (L) 03/23/2024    EGFR 107 03/23/2024     Clozapine:   Lab Results   Component Value Date    CLOZAPINE 867 04/04/2024    NCLOZIP 241 11/29/2023     Drug Screen:   Lab Results   Component Value Date    AMPMETHUR Negative 03/22/2024    BARBTUR Negative 03/22/2024    BDZUR Negative 03/22/2024    THCUR Negative 03/22/2024    COCAINEUR Negative 03/22/2024    METHADONEUR Negative 03/22/2024    OPIATEUR Negative 03/22/2024    PCPUR Negative 03/22/2024     EKG   Lab Results   Component Value Date    VENTRATE 68 03/23/2024    ATRIALRATE 68 03/23/2024    PRINT 152 03/23/2024    QRSDINT 68 03/23/2024    QTINT 440 03/23/2024    QTCINT 467 03/23/2024    PAXIS 58 03/23/2024    QRSAXIS 3 03/23/2024    TWAVEAXIS 50 03/23/2024       Counseling / Coordination of Care  Total floor / unit time spent today 30 minutes. Greater than 50% of total time was spent with the patient and / or family counseling and / or coordination of care. A description of the counseling / coordination of care: Medication education, treatment plan, safety/discharge planning

## 2024-04-09 NOTE — SOCIAL WORK
ACT unable to provide transport at [place] tomorrow per ALEXANDREA PEREZ <[phone]>. CM manager notified, transport approved for transport tomorrow.

## 2024-04-09 NOTE — SOCIAL WORK
Contact received from [facility] ALEXANDREA PEREZ <[phone]> states pt has code to get into building, she is able to get into apartment tomorrow. ACT team will drop off meds later in day and will see pt for 7 days upon dc, will see psych provider. [place] will also notify the residential program if pt need support upon dc. [facility] address confirmed. Call ended mutually.     Roundtrip requested for tomorrow 11am.

## 2024-04-09 NOTE — DISCHARGE SUMMARY
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 of at least 3 antipsychotic medication trials and failure of multiple trials of monotherapy.  Jo-Ann CUMMINGS has been filing controlled prescriptions on time as prescribed according to [place] Prescription Drug Monitoring Program.    JARED Bolton 04/10/24

## 2024-04-09 NOTE — PLAN OF CARE
Problem: DISCHARGE PLANNING - CARE MANAGEMENT  Goal: Discharge to post-acute care or home with appropriate resources  Description: INTERVENTIONS:  - Conduct assessment to determine patient/family and health care team treatment goals, and need for post-acute services based on payer coverage, community resources, and patient preferences, and barriers to discharge  - Address psychosocial, clinical, and financial barriers to discharge as identified in assessment in conjunction with the patient/family and health care team  - Arrange appropriate level of post-acute services according to patient’s   needs and preference and payer coverage in collaboration with the physician and health care team  - Communicate with and update the patient/family, physician, and health care team regarding progress on the discharge plan  - Arrange appropriate transportation to post-acute venues  Outcome: Completed     DC Wed 1pm with transport provided to supervised living facility with [facility]; declined need for D&A, pcp.

## 2024-04-09 NOTE — SOCIAL WORK
Contact returned to [facility] VICKYDEVENHAYDEN PEREZ <[phone]> regarding transport tomorrow; vm left.

## 2024-04-09 NOTE — PROGRESS NOTES
04/09/24   Team Meeting   Meeting Type Daily Rounds   Team Members Present   Team Members Present Physician;Nurse;   Physician Team Member Dr Ronald LARSON; Yair COLEMAN; Dr Moi LARSON   Nursing Team Member Agustin JIMENES   Social Work Team Member Abelardo HOPSON   OT Team Member Gurjit HUITRON   Patient/Family Present   Patient Present No   Patient's Family Present No     DC home tomorrow with [facility]; one PRN yest after yelling on unit, AH, appears more relaxed, feels safe.

## 2024-04-09 NOTE — SOCIAL WORK
Email of pick pt up time sent to:  Anuja Segundo <[email or web address]>, Cipriano Newton <[email or web address]>    The [facility] you requested for Jo-Ann HANSEN in unit/room [facility] 254 on 04/10/2024 is scheduled to arrive at 1:00pm EDT! [facility] is handling this ride and you can contact them at [phone].

## 2024-04-09 NOTE — NURSING NOTE
Patient intermittently seen in milieu. Affect brightens on approach. She reports she did have some AH today after not having any for three days but states they quickly resolved with medication. She denies SI/HI and reports mild anxiety. Compliant with medication as ordered. Reports issues with constipations at times but states last BM yesterday. Q7 minute monitoring ongoing.

## 2024-04-09 NOTE — NURSING NOTE
Patient visible on the unit, social with peers. Pleasant & cooperative with staff. Denies SI/HI/AVH, taking medications as prescribed. Focused on discharge. Q 7 minute checks maintained.

## 2024-04-10 VITALS
BODY MASS INDEX: 28.39 KG/M2 | DIASTOLIC BLOOD PRESSURE: 76 MMHG | RESPIRATION RATE: 18 BRPM | OXYGEN SATURATION: 95 % | HEART RATE: 79 BPM | TEMPERATURE: 97.7 F | HEIGHT: 60 IN | WEIGHT: 144.6 LBS | SYSTOLIC BLOOD PRESSURE: 117 MMHG

## 2024-04-10 PROCEDURE — 99238 HOSP IP/OBS DSCHRG MGMT 30/<: CPT | Performed by: NURSE PRACTITIONER

## 2024-04-10 RX ADMIN — METHOCARBAMOL TABLETS 750 MG: 500 TABLET, COATED ORAL at 08:17

## 2024-04-10 RX ADMIN — RISPERIDONE 2 MG: 2 TABLET ORAL at 08:16

## 2024-04-10 RX ADMIN — SIMETHICONE 80 MG: 80 TABLET, CHEWABLE ORAL at 12:02

## 2024-04-10 RX ADMIN — LACTULOSE 20 G: 20 SOLUTION ORAL at 12:02

## 2024-04-10 RX ADMIN — FOLIC ACID 1 MG: 1 TABLET ORAL at 08:17

## 2024-04-10 RX ADMIN — CLOZAPINE 50 MG: 25 TABLET ORAL at 08:17

## 2024-04-10 RX ADMIN — CHOLECALCIFEROL TAB 25 MCG (1000 UNIT) 1000 UNITS: 25 TAB at 08:16

## 2024-04-10 RX ADMIN — THIAMINE HCL TAB 100 MG 100 MG: 100 TAB at 08:17

## 2024-04-10 RX ADMIN — SENNOSIDES AND DOCUSATE SODIUM 1 TABLET: 8.6; 5 TABLET ORAL at 08:16

## 2024-04-10 RX ADMIN — LAMOTRIGINE 100 MG: 100 TABLET ORAL at 08:17

## 2024-04-10 RX ADMIN — BENZONATATE 100 MG: 100 CAPSULE ORAL at 08:16

## 2024-04-10 RX ADMIN — PANTOPRAZOLE SODIUM 40 MG: 40 TABLET, DELAYED RELEASE ORAL at 06:25

## 2024-04-10 RX ADMIN — VENLAFAXINE HYDROCHLORIDE 112.5 MG: 75 CAPSULE, EXTENDED RELEASE ORAL at 08:16

## 2024-04-10 RX ADMIN — LIDOCAINE 5% 1 PATCH: 700 PATCH TOPICAL at 09:20

## 2024-04-10 RX ADMIN — Medication 1 TABLET: at 08:16

## 2024-04-10 RX ADMIN — LORAZEPAM 0.5 MG: 0.5 TABLET ORAL at 08:16

## 2024-04-10 RX ADMIN — SIMETHICONE 80 MG: 80 TABLET, CHEWABLE ORAL at 06:25

## 2024-04-10 RX ADMIN — FLUTICASONE PROPIONATE 1 SPRAY: 50 SPRAY, METERED NASAL at 08:17

## 2024-04-10 RX ADMIN — MELOXICAM 15 MG: 7.5 TABLET ORAL at 08:16

## 2024-04-10 RX ADMIN — FENOFIBRATE 145 MG: 145 TABLET, FILM COATED ORAL at 08:16

## 2024-04-10 RX ADMIN — BUDESONIDE AND FORMOTEROL FUMARATE DIHYDRATE 2 PUFF: 160; 4.5 AEROSOL RESPIRATORY (INHALATION) at 08:18

## 2024-04-10 RX ADMIN — GUAIFENESIN 1200 MG: 600 TABLET ORAL at 08:17

## 2024-04-10 NOTE — NURSING NOTE
Patient A&Ox4 on discharge, no SI/HI/AVH. AVS reviewed with patient, all questions answered. Left unit with staff to meet friend.

## 2024-04-10 NOTE — BH TRANSITION RECORD
Contact Information: If you have any questions, concerns, pended studies, tests and/or procedures, or emergencies regarding your inpatient behavioral health visit. Please contact [facility] # [phone] and ask to speak to a , nurse or physician. A contact is available 24 hours/ 7 days a week at this number.     Summary of Procedures Performed During your Stay:  Below is a list of major procedures performed during your hospital stay and a summary of results:  - No major procedures performed.    Pending Studies (From admission, onward)       Start     Ordered    04/01/24 1104  Herpes I/II IgG MILI w Reflex to HSV-2  Once        Question:  Release to patient through [facility]  Answer:  Immediate    04/01/24 1104    03/28/24 0600  CBC and differential  Every Thursday      Comments: Clozaril therapy     Start Status   04/11/24 0600 Scheduled   04/18/24 0600 Scheduled   04/25/24 0600 Scheduled       03/26/24 1440                  Please follow up on the above pending studies with your PCP and/or referring provider.

## 2024-04-10 NOTE — PLAN OF CARE
Problem: Ineffective Coping  Goal: Participates in unit activities  Description: Interventions:  - Provide therapeutic environment   - Provide required programming   - Redirect inappropriate behaviors   Outcome: Adequate for Discharge   PT has been attending select art therapy groups along with other unit programming where she mostly observes peers and listens. PT does appear more comfortable to speaking with AT 1:1 rather than in a group setting. PT is scheduled to discharge today and reports that she is feeling better and ready to go.

## 2024-04-10 NOTE — PROGRESS NOTES
Hemalatha Mota  completed relapse prevention plan was completed with pt 3/25/24 @ 1:00pm. Copy of plan is placed in DC folder and the other in media

## 2024-04-10 NOTE — NURSING NOTE
Listed in flow sheet as well as in this writers note. PA ID EBT card x 1 [employer] cards x 2 Keys Cigs x 3 lighter cell phone x 1 cracked screen $1.00 in dismes 32 pennies Equals $1.32

## 2024-04-10 NOTE — NURSING NOTE
Patient was visible in milieu, calm ,cooperative and pleasant on approach. Patient was excited about on coming discharge,complied with meals and medications and participated in group activities. Staff maintained Q 7 safety checks, administered medications as prescribed and assisted as needed. We will continue to monitor, support and encourage.   No difficulties

## 2024-04-10 NOTE — PROGRESS NOTES
Progress Note - Jo-Ann Lamb 53 y.o. female MRN: [id number]    Unit/Bed#: [facility] 254-01 Encounter: [id number]        Subjective:   Patient seen and examined at bedside after reviewing the chart and discussing the case with the caring staff.      Patient examined at bedside.  Patient has no acute symptoms.     Patient is being discharged today, 4/10/24.     Physical Exam   Vitals: Blood pressure 117/76, pulse 79, temperature 97.7 °F (36.5 °C), temperature source Temporal, resp. rate 18, height 5' (1.524 m), weight 65.6 kg (144 lb 9.6 oz), last menstrual period 01/01/2020, SpO2 95%, not currently breastfeeding.,Body mass index is 28.24 kg/m².  Constitutional: Patient in no acute distress.  HEENT: PERR, EOMI, MMM.  Cardiovascular: Normal rate and regular rhythm.    Pulmonary/Chest: Effort normal and breath sounds normal.   Abdomen: Soft, + BS, NT, no rebound, no guarding, no rigidity.     Assessment/Plan:  Jo-Ann Lamb is a(n) 53 y.o. year old female with schizoaffective disorder.    Medical Clearance: Patient is medically cleared for discharge. All prescriptions have been sent to pharmacy.      Asthma.  Patient on Symbicort inhaler twice daily and albuterol as needed.   Hypertriglyceridemia.  Patient on Fenofibrate 145 mg daily. Lipid panel 3/23/24: cholesterol 221, triglycerides 211, . Patient declines statin at this time and would like to f/up with PCP outpt.   Allergic rhinitis.  Patient on Flonase daily.   GERD. Patient on Protonix 40 mg daily.   Tobacco abuse. NRT.   Alcohol abuse.  Patient started on thiamine 100 mg, folic acid 1 mg, and multivitamin daily.   Chronic back pain.  Patient on Mobic 15 mg daily, Robaxin 500 mg twice daily, and lidocaine patch daily. Increase Robaxin to 750 mg twice daily 4/5.  Vitamin D deficiency.  Continue daily supplement.   Cough/congestion.  Increase Mucinex to 1200 mg twice daily 3/30.  Tessalon pearls as needed.    Callus of R great toe.  Encouraged pt to f/u with podiatry  outpatient.    Constipation/hemorrhoids/flatulence.  On senokot S twice daily.  Miralax and lactulose as needed.  Apply Anusol daily.  Add simethicone 80 mg QID.    Vaginal candidiasis.  Diflucan 150 mg x 1 dose 3/30.    Overactive bladder.  Would avoid ditropan at this time due to possible worsening constipation with Clozaril.  Recommended pelvic floor muscle exercises.  Patient encouraged to follow-up with PCP/gyn on outpatient basis.   Routine STD testing. RPR, HIV, Hep C Ab, Hep B surface Ag, Chlamydia/GC all negative. HSV pending.    The patient was discussed with Dr. Steel and he is in agreement with the above note.

## 2024-04-10 NOTE — PROGRESS NOTES
04/10/24   Team Meeting   Meeting Type Daily Rounds   Team Members Present   Team Members Present Physician;Nurse;Occupational Therapist;   Physician Team Member Dr Moi LARSON; Yair COLEMAN   Nursing Team Member Agustin JIMENES   Social Work Team Member Abelardo HOPSON   OT Team Member Gurjit HUITRON   Patient/Family Present   Patient Present No   Patient's Family Present No   DC Wed 1pm with transport provided to supervised living facility with [facility]; declined need for D&A, pcp.

## 2024-04-18 ENCOUNTER — TELEPHONE (OUTPATIENT)
Dept: NEUROLOGY | Facility: CLINIC | Age: 54
End: 2024-04-18

## 2024-04-18 ENCOUNTER — OFFICE VISIT (OUTPATIENT)
Dept: FAMILY MEDICINE CLINIC | Facility: CLINIC | Age: 54
End: 2024-04-18
Payer: COMMERCIAL

## 2024-04-18 VITALS
BODY MASS INDEX: 29.84 KG/M2 | TEMPERATURE: 96.6 F | RESPIRATION RATE: 20 BRPM | HEIGHT: 60 IN | HEART RATE: 84 BPM | WEIGHT: 152 LBS | SYSTOLIC BLOOD PRESSURE: 126 MMHG | DIASTOLIC BLOOD PRESSURE: 78 MMHG

## 2024-04-18 DIAGNOSIS — K21.9 GASTROESOPHAGEAL REFLUX DISEASE WITHOUT ESOPHAGITIS: ICD-10-CM

## 2024-04-18 DIAGNOSIS — F17.210 SMOKING GREATER THAN 20 PACK YEARS: ICD-10-CM

## 2024-04-18 DIAGNOSIS — K59.04 CHRONIC IDIOPATHIC CONSTIPATION: ICD-10-CM

## 2024-04-18 DIAGNOSIS — J40 BRONCHITIS: Primary | ICD-10-CM

## 2024-04-18 DIAGNOSIS — R10.11 RIGHT UPPER QUADRANT PAIN: ICD-10-CM

## 2024-04-18 DIAGNOSIS — M54.50 CHRONIC BILATERAL LOW BACK PAIN WITHOUT SCIATICA: ICD-10-CM

## 2024-04-18 DIAGNOSIS — J45.30 MILD PERSISTENT ASTHMA WITHOUT COMPLICATION: ICD-10-CM

## 2024-04-18 DIAGNOSIS — F25.0 SCHIZOAFFECTIVE DISORDER, BIPOLAR TYPE (HCC): ICD-10-CM

## 2024-04-18 DIAGNOSIS — G89.29 CHRONIC BILATERAL LOW BACK PAIN WITHOUT SCIATICA: ICD-10-CM

## 2024-04-18 DIAGNOSIS — E78.2 MIXED HYPERLIPIDEMIA: ICD-10-CM

## 2024-04-18 DIAGNOSIS — J43.9 PULMONARY EMPHYSEMA, UNSPECIFIED EMPHYSEMA TYPE (HCC): ICD-10-CM

## 2024-04-18 PROCEDURE — 99214 OFFICE O/P EST MOD 30 MIN: CPT | Performed by: FAMILY MEDICINE

## 2024-04-18 PROCEDURE — 99396 PREV VISIT EST AGE 40-64: CPT | Performed by: FAMILY MEDICINE

## 2024-04-18 RX ORDER — ATORVASTATIN CALCIUM 10 MG/1
10 TABLET, FILM COATED ORAL DAILY
Qty: 90 TABLET | Refills: 3 | Status: SHIPPED | OUTPATIENT
Start: 2024-04-18

## 2024-04-18 RX ORDER — AZITHROMYCIN 250 MG/1
TABLET, FILM COATED ORAL DAILY
Qty: 6 TABLET | Refills: 0 | Status: SHIPPED | OUTPATIENT
Start: 2024-04-18 | End: 2024-04-23
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
## 2024-04-18 NOTE — TELEPHONE ENCOUNTER
Received VM 4-16-24  11:33 am  Taken off 4-18-24   7:40 am    Transcribed:  Elvin, this is Katie at [facility]. I was trying to follow up on a refill request for patient Jo-Ann Lamb YOB: 1970. Looking for Risperidone 2 mg, generic Effexor extended release 75 mg, and Lorazepam 0.5 mg. If you can please let me know if we can get this approved an or denied so that I can follow up with the facility. You can reach me at phone number [phone]. Our fax number is [phone]. Again, this is Katie at [facility]. Thank you so much.  -------------------------------------------------------------------------------------------------------    Effexor-XR 37.5 mg refilled 4-9-24 by Bri Mazariegos    Patient last seen by Dr Contreras on 7-18-22.    Called [phone]. Left a detailed message on answering machine stating we are unable to refill the Effexor and Lorazepam. Patient has not been seen since 7-18-22 and these medications should be monitored by patient's PCP or a behavioral health specialist.

## 2024-04-19 ENCOUNTER — TELEPHONE (OUTPATIENT)
Dept: NEUROLOGY | Facility: CLINIC | Age: 54
End: 2024-04-19

## 2024-04-19 NOTE — TELEPHONE ENCOUNTER
Received VM 4-17-24 4:36 pm  Taken off 4-19-24 2:21 pm    Katie at [facility] called again. Already handled.

## 2024-04-26 ENCOUNTER — HOSPITAL ENCOUNTER (OUTPATIENT)
Dept: CT IMAGING | Facility: HOSPITAL | Age: 54
End: 2024-04-26
Payer: COMMERCIAL

## 2024-04-26 DIAGNOSIS — Z12.2 ENCOUNTER FOR SCREENING FOR LUNG CANCER: ICD-10-CM

## 2024-04-26 DIAGNOSIS — F17.210 SMOKING GREATER THAN 20 PACK YEARS: ICD-10-CM

## 2024-04-26 PROCEDURE — 71271 CT THORAX LUNG CANCER SCR C-: CPT

## 2024-05-06 ENCOUNTER — TELEPHONE (OUTPATIENT)
Dept: FAMILY MEDICINE CLINIC | Facility: CLINIC | Age: 54
End: 2024-05-06

## 2024-05-06 NOTE — TELEPHONE ENCOUNTER
----- Message from Cipriano Lew DO sent at 5/6/2024 10:21 AM EDT -----  Please advise of normal result and recheck again in one year  ----- Message -----  From: Katina Hare DO  Sent: 5/6/2024   8:08 AM EDT  To: Cipriano Lwe DO      ----- Message -----  From: Jitendra, Radiology Results In  Sent: 5/5/2024   9:28 PM EDT  To: Katina Hare DO

## 2024-05-08 NOTE — RESULT ENCOUNTER NOTE
Left voicemail with result. Patient is overdue for followup. Please reach out and schedule. Karan or Sandhya KEATING    Thanks

## 2024-05-16 ENCOUNTER — APPOINTMENT (OUTPATIENT)
Dept: LAB | Facility: CLINIC | Age: 54
End: 2024-05-16
Payer: COMMERCIAL

## 2024-05-20 NOTE — NURSING NOTE
Jose Franciscowalker Kiproman is paranoid, calm, and pleasant on approach  Juan Rodriguez denies SI/HI  Juan Rodriguez states she continues to hear voices and see things  Juan Rodriguez refused to go into detail  Juan Rodriguez states his anxiety is 2/4 and depression is 2/10  Juan Rodriguez did shower for the day  Juan Rodriguez states she eats and sleeps good  Juan Shinroman has a blunted affect  Juan Rodriguez continues to be internally preoccupied  Writer noted pt yelling in her room  Juan Rodriguez is med compliant  Will continue to monitor and assess for safety  PAST MEDICAL HISTORY:  H/O tonsillitis     Mandibular hyperplasia     Maxillary hypoplasia

## 2024-05-24 ENCOUNTER — TELEPHONE (OUTPATIENT)
Age: 54
End: 2024-05-24

## 2024-05-24 NOTE — TELEPHONE ENCOUNTER
Patient called, states she wanted to see what tests she still needed to have done.     RN advised the the ultrasound and mammogram were listed as needing completion. Provided central scheduling number for patient to follow up to schedule tests.     Patient states she would like prednisone sent to the [facility] pharmacy in [place] for her back. No further questions/concerns at this time.       Please advise.

## 2024-05-28 ENCOUNTER — TELEPHONE (OUTPATIENT)
Age: 54
End: 2024-05-28

## 2024-05-28 NOTE — TELEPHONE ENCOUNTER
This is a duplicate request. Previous message has been routed to PCP; however, PCP not in office today.

## 2024-05-28 NOTE — TELEPHONE ENCOUNTER
Patient said pain management has no appt available and is working on getting one. She said she has pain in her back and wanted to know if her PCP can write a script for  Prednisone 20 mg 2x a day for 5 days. That is what her pain management dr usually gives her. Please advise patient    Detail Level: Simple Render Risk Assessment In Note?: no Comment: Discussed continued healing by second intention of delayed repair.

## 2024-05-28 NOTE — TELEPHONE ENCOUNTER
Review of chart:   Patient previously prescribed prednisone 20mg -- #10, take 2 tablets BID x 5 days. Please advise

## 2024-05-29 NOTE — TELEPHONE ENCOUNTER
PT called back in today to follow-up on Prednisone script.    PT will not be able to see pain management for a while she states and that her back pain has been too painful as of late.     Please advise & call pt with any further information or questions. Thank you!    Jo-Ann Lamb (Self)   [phone] (Home)

## 2024-05-29 NOTE — TELEPHONE ENCOUNTER
PT called back inquiring about script for Prednisone.    Informed pt that she should speak with pain management in regards to back pain.     PT will call pain management.

## 2024-06-12 ENCOUNTER — APPOINTMENT (OUTPATIENT)
Dept: LAB | Facility: CLINIC | Age: 54
End: 2024-06-12
Payer: COMMERCIAL

## 2024-06-12 ENCOUNTER — TELEPHONE (OUTPATIENT)
Age: 54
End: 2024-06-12

## 2024-06-12 NOTE — TELEPHONE ENCOUNTER
Patient has appt on Monday with spine & pain. But she is in a lot of pain wanted to know if you could call in something till see can get into the new doctor.    Script would go to [facility] phone [phone]    Please let patient know it is ok to let message [phone]

## 2024-06-12 NOTE — TELEPHONE ENCOUNTER
Patient established with [facility]. Patient needs to contact pain management for any issues related to her pain.

## 2024-06-17 ENCOUNTER — TELEPHONE (OUTPATIENT)
Age: 54
End: 2024-06-17

## 2024-06-17 NOTE — TELEPHONE ENCOUNTER
Patient called and is requesting a pregnancy test be ordered for her. She stated she has had unprotected sex (unable to remember when), but she now feels bloated and slight movement in her stomach. Denied any other symptoms.     Please advise, thank you.

## 2024-06-17 NOTE — TELEPHONE ENCOUNTER
Patient has repeatedly been advised to contact OBGYN. Patient should also discuss birth control options with OBGYN-- there was a referral placed 02/26/2024. A new referral can be placed if needed.

## 2024-06-25 ENCOUNTER — HOSPITAL ENCOUNTER (EMERGENCY)
Facility: HOSPITAL | Age: 54
End: 2024-06-26
Attending: EMERGENCY MEDICINE
Payer: COMMERCIAL

## 2024-06-25 DIAGNOSIS — Z00.8 ENCOUNTER FOR PSYCHOLOGICAL EVALUATION: Primary | ICD-10-CM

## 2024-06-25 LAB
ALBUMIN SERPL BCG-MCNC: 4.2 G/DL (ref 3.5–5)
ALP SERPL-CCNC: 80 U/L (ref 34–104)
ALT SERPL W P-5'-P-CCNC: 18 U/L (ref 7–52)
AMPHETAMINES SERPL QL SCN: NEGATIVE
ANION GAP SERPL CALCULATED.3IONS-SCNC: 8 MMOL/L (ref 4–13)
APAP SERPL-MCNC: <2 UG/ML (ref 10–20)
AST SERPL W P-5'-P-CCNC: 14 U/L (ref 13–39)
BARBITURATES UR QL: NEGATIVE
BASOPHILS # BLD AUTO: 0.05 THOUSANDS/ÂΜL (ref 0–0.1)
BASOPHILS NFR BLD AUTO: 0 % (ref 0–1)
BENZODIAZ UR QL: NEGATIVE
BILIRUB SERPL-MCNC: 0.21 MG/DL (ref 0.2–1)
BILIRUB UR QL STRIP: NEGATIVE
BUN SERPL-MCNC: 3 MG/DL (ref 5–25)
CALCIUM SERPL-MCNC: 8.9 MG/DL (ref 8.4–10.2)
CHLORIDE SERPL-SCNC: 101 MMOL/L (ref 96–108)
CLARITY UR: CLEAR
CO2 SERPL-SCNC: 27 MMOL/L (ref 21–32)
COCAINE UR QL: NEGATIVE
COLOR UR: YELLOW
CREAT SERPL-MCNC: 0.51 MG/DL (ref 0.6–1.3)
EOSINOPHIL # BLD AUTO: 0.5 THOUSAND/ÂΜL (ref 0–0.61)
EOSINOPHIL NFR BLD AUTO: 4 % (ref 0–6)
ERYTHROCYTE [DISTWIDTH] IN BLOOD BY AUTOMATED COUNT: 14.2 % (ref 11.6–15.1)
ETHANOL SERPL-MCNC: 12 MG/DL
ETHANOL SERPL-MCNC: 193 MG/DL
EXT PREGNANCY TEST URINE: NEGATIVE
EXT. CONTROL: NORMAL
FENTANYL UR QL SCN: NEGATIVE
GFR SERPL CREATININE-BSD FRML MDRD: 110 ML/MIN/1.73SQ M
GLUCOSE SERPL-MCNC: 100 MG/DL (ref 65–140)
GLUCOSE UR STRIP-MCNC: NEGATIVE MG/DL
HCT VFR BLD AUTO: 39.6 % (ref 34.8–46.1)
HGB BLD-MCNC: 12.7 G/DL (ref 11.5–15.4)
HGB UR QL STRIP.AUTO: NEGATIVE
HYDROCODONE UR QL SCN: NEGATIVE
IMM GRANULOCYTES # BLD AUTO: 0.16 THOUSAND/UL (ref 0–0.2)
IMM GRANULOCYTES NFR BLD AUTO: 1 % (ref 0–2)
KETONES UR STRIP-MCNC: NEGATIVE MG/DL
LEUKOCYTE ESTERASE UR QL STRIP: NEGATIVE
LYMPHOCYTES # BLD AUTO: 2.53 THOUSANDS/ÂΜL (ref 0.6–4.47)
LYMPHOCYTES NFR BLD AUTO: 19 % (ref 14–44)
MCH RBC QN AUTO: 29.6 PG (ref 26.8–34.3)
MCHC RBC AUTO-ENTMCNC: 32.1 G/DL (ref 31.4–37.4)
MCV RBC AUTO: 92 FL (ref 82–98)
METHADONE UR QL: NEGATIVE
MONOCYTES # BLD AUTO: 0.93 THOUSAND/ÂΜL (ref 0.17–1.22)
MONOCYTES NFR BLD AUTO: 7 % (ref 4–12)
NEUTROPHILS # BLD AUTO: 8.89 THOUSANDS/ÂΜL (ref 1.85–7.62)
NEUTS SEG NFR BLD AUTO: 69 % (ref 43–75)
NITRITE UR QL STRIP: NEGATIVE
NRBC BLD AUTO-RTO: 0 /100 WBCS
OPIATES UR QL SCN: NEGATIVE
OXYCODONE+OXYMORPHONE UR QL SCN: NEGATIVE
PCP UR QL: NEGATIVE
PH UR STRIP.AUTO: 6 [PH]
PLATELET # BLD AUTO: 280 THOUSANDS/UL (ref 149–390)
PMV BLD AUTO: 8.7 FL (ref 8.9–12.7)
POTASSIUM SERPL-SCNC: 3.5 MMOL/L (ref 3.5–5.3)
PROT SERPL-MCNC: 6.8 G/DL (ref 6.4–8.4)
PROT UR STRIP-MCNC: NEGATIVE MG/DL
RBC # BLD AUTO: 4.29 MILLION/UL (ref 3.81–5.12)
SALICYLATES SERPL-MCNC: <5 MG/DL (ref 3–20)
SODIUM SERPL-SCNC: 136 MMOL/L (ref 135–147)
SP GR UR STRIP.AUTO: <=1.005
THC UR QL: NEGATIVE
TSH SERPL DL<=0.05 MIU/L-ACNC: 1.16 UIU/ML (ref 0.45–4.5)
UROBILINOGEN UR QL STRIP.AUTO: 0.2 E.U./DL
WBC # BLD AUTO: 13.06 THOUSAND/UL (ref 4.31–10.16)

## 2024-06-25 PROCEDURE — 80307 DRUG TEST PRSMV CHEM ANLYZR: CPT | Performed by: EMERGENCY MEDICINE

## 2024-06-25 PROCEDURE — 85025 COMPLETE CBC W/AUTO DIFF WBC: CPT | Performed by: EMERGENCY MEDICINE

## 2024-06-25 PROCEDURE — 80179 DRUG ASSAY SALICYLATE: CPT | Performed by: EMERGENCY MEDICINE

## 2024-06-25 PROCEDURE — 84443 ASSAY THYROID STIM HORMONE: CPT | Performed by: EMERGENCY MEDICINE

## 2024-06-25 PROCEDURE — 81025 URINE PREGNANCY TEST: CPT | Performed by: EMERGENCY MEDICINE

## 2024-06-25 PROCEDURE — 99284 EMERGENCY DEPT VISIT MOD MDM: CPT

## 2024-06-25 PROCEDURE — 81003 URINALYSIS AUTO W/O SCOPE: CPT | Performed by: EMERGENCY MEDICINE

## 2024-06-25 PROCEDURE — 80053 COMPREHEN METABOLIC PANEL: CPT | Performed by: EMERGENCY MEDICINE

## 2024-06-25 PROCEDURE — 82077 ASSAY SPEC XCP UR&BREATH IA: CPT | Performed by: EMERGENCY MEDICINE

## 2024-06-25 PROCEDURE — 93005 ELECTROCARDIOGRAM TRACING: CPT

## 2024-06-25 PROCEDURE — 99285 EMERGENCY DEPT VISIT HI MDM: CPT | Performed by: EMERGENCY MEDICINE

## 2024-06-25 PROCEDURE — 36415 COLL VENOUS BLD VENIPUNCTURE: CPT | Performed by: EMERGENCY MEDICINE

## 2024-06-25 PROCEDURE — 80143 DRUG ASSAY ACETAMINOPHEN: CPT | Performed by: EMERGENCY MEDICINE

## 2024-06-25 RX ORDER — RISPERIDONE 1 MG/1
0.5 TABLET ORAL
Status: CANCELLED | OUTPATIENT
Start: 2024-06-25

## 2024-06-25 RX ORDER — ACETAMINOPHEN 325 MG/1
650 TABLET ORAL EVERY 4 HOURS PRN
Status: CANCELLED | OUTPATIENT
Start: 2024-06-25

## 2024-06-25 RX ORDER — RISPERIDONE 1 MG/1
1 TABLET ORAL
Status: CANCELLED | OUTPATIENT
Start: 2024-06-25

## 2024-06-25 RX ORDER — RISPERIDONE 0.25 MG/1
0.25 TABLET ORAL
Status: CANCELLED | OUTPATIENT
Start: 2024-06-25

## 2024-06-25 RX ORDER — LANOLIN ALCOHOL/MO/W.PET/CERES
3 CREAM (GRAM) TOPICAL
Status: CANCELLED | OUTPATIENT
Start: 2024-06-26

## 2024-06-25 RX ORDER — MAGNESIUM HYDROXIDE/ALUMINUM HYDROXICE/SIMETHICONE 120; 1200; 1200 MG/30ML; MG/30ML; MG/30ML
30 SUSPENSION ORAL EVERY 4 HOURS PRN
Status: CANCELLED | OUTPATIENT
Start: 2024-06-25

## 2024-06-25 RX ORDER — HYDROXYZINE HYDROCHLORIDE 25 MG/1
25 TABLET, FILM COATED ORAL
Status: CANCELLED | OUTPATIENT
Start: 2024-06-25

## 2024-06-25 RX ORDER — HALOPERIDOL 5 MG/ML
5 INJECTION INTRAMUSCULAR
Status: CANCELLED | OUTPATIENT
Start: 2024-06-25

## 2024-06-25 RX ORDER — ACETAMINOPHEN 325 MG/1
975 TABLET ORAL EVERY 6 HOURS PRN
Status: CANCELLED | OUTPATIENT
Start: 2024-06-25

## 2024-06-25 RX ORDER — ACETAMINOPHEN 325 MG/1
975 TABLET ORAL ONCE
Status: DISCONTINUED | OUTPATIENT
Start: 2024-06-25 | End: 2024-06-26 | Stop reason: HOSPADM

## 2024-06-25 RX ORDER — PROPRANOLOL HYDROCHLORIDE 10 MG/1
5 TABLET ORAL EVERY 8 HOURS PRN
Status: CANCELLED | OUTPATIENT
Start: 2024-06-25

## 2024-06-25 RX ORDER — TRAZODONE HYDROCHLORIDE 50 MG/1
50 TABLET ORAL
Status: CANCELLED | OUTPATIENT
Start: 2024-06-25

## 2024-06-25 RX ORDER — LORAZEPAM 0.5 MG/1
0.5 TABLET ORAL ONCE
Status: COMPLETED | OUTPATIENT
Start: 2024-06-25 | End: 2024-06-25

## 2024-06-25 RX ORDER — HYDROXYZINE 50 MG/1
50 TABLET, FILM COATED ORAL
Status: CANCELLED | OUTPATIENT
Start: 2024-06-25

## 2024-06-25 RX ADMIN — LORAZEPAM 0.5 MG: 0.5 TABLET ORAL at 15:30
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
## 2024-06-26 ENCOUNTER — TELEPHONE (OUTPATIENT)
Age: 54
End: 2024-06-26

## 2024-06-26 ENCOUNTER — HOSPITAL ENCOUNTER (INPATIENT)
Facility: HOSPITAL | Age: 54
LOS: 6 days | Discharge: HOME | DRG: 750 | End: 2024-07-02
Attending: PSYCHIATRY & NEUROLOGY | Admitting: PSYCHIATRY & NEUROLOGY
Payer: COMMERCIAL

## 2024-06-26 VITALS
DIASTOLIC BLOOD PRESSURE: 76 MMHG | SYSTOLIC BLOOD PRESSURE: 145 MMHG | TEMPERATURE: 97.6 F | OXYGEN SATURATION: 96 % | RESPIRATION RATE: 18 BRPM | HEART RATE: 97 BPM

## 2024-06-26 DIAGNOSIS — M54.9 BACK PAIN: ICD-10-CM

## 2024-06-26 DIAGNOSIS — J43.9 PULMONARY EMPHYSEMA, UNSPECIFIED EMPHYSEMA TYPE (HCC): ICD-10-CM

## 2024-06-26 DIAGNOSIS — L30.4 INTERTRIGO: ICD-10-CM

## 2024-06-26 DIAGNOSIS — K13.79 MOUTH SORES: ICD-10-CM

## 2024-06-26 DIAGNOSIS — K64.9 HEMORRHOIDS, UNSPECIFIED HEMORRHOID TYPE: ICD-10-CM

## 2024-06-26 DIAGNOSIS — F43.12 POST-TRAUMATIC STRESS DISORDER, CHRONIC: Chronic | ICD-10-CM

## 2024-06-26 DIAGNOSIS — F25.0 SCHIZOAFFECTIVE DISORDER, BIPOLAR TYPE (HCC): Primary | Chronic | ICD-10-CM

## 2024-06-26 DIAGNOSIS — E78.2 MIXED HYPERLIPIDEMIA: ICD-10-CM

## 2024-06-26 DIAGNOSIS — E55.9 VITAMIN D DEFICIENCY: ICD-10-CM

## 2024-06-26 DIAGNOSIS — F17.200 NICOTINE USE DISORDER: ICD-10-CM

## 2024-06-26 DIAGNOSIS — Z00.8 ENCOUNTER FOR PSYCHOLOGICAL EVALUATION: ICD-10-CM

## 2024-06-26 DIAGNOSIS — G44.89 OTHER HEADACHE SYNDROME: ICD-10-CM

## 2024-06-26 DIAGNOSIS — J45.21 MILD INTERMITTENT ASTHMATIC BRONCHITIS WITH ACUTE EXACERBATION: ICD-10-CM

## 2024-06-26 DIAGNOSIS — F10.10 ALCOHOL ABUSE: ICD-10-CM

## 2024-06-26 DIAGNOSIS — K59.01 SLOW TRANSIT CONSTIPATION: ICD-10-CM

## 2024-06-26 DIAGNOSIS — K59.04 CHRONIC IDIOPATHIC CONSTIPATION: ICD-10-CM

## 2024-06-26 DIAGNOSIS — R09.81 CONGESTION OF NASAL SINUS: ICD-10-CM

## 2024-06-26 DIAGNOSIS — R05.9 COUGH: ICD-10-CM

## 2024-06-26 DIAGNOSIS — K21.9 GASTROESOPHAGEAL REFLUX DISEASE WITHOUT ESOPHAGITIS: ICD-10-CM

## 2024-06-26 LAB
ATRIAL RATE: 100 BPM
P AXIS: 67 DEGREES
PR INTERVAL: 160 MS
QRS AXIS: 32 DEGREES
QRSD INTERVAL: 72 MS
QT INTERVAL: 390 MS
QTC INTERVAL: 503 MS
T WAVE AXIS: 47 DEGREES
VENTRICULAR RATE: 100 BPM

## 2024-06-26 PROCEDURE — 93010 ELECTROCARDIOGRAM REPORT: CPT | Performed by: INTERNAL MEDICINE

## 2024-06-26 PROCEDURE — GZHZZZZ GROUP PSYCHOTHERAPY: ICD-10-PCS | Performed by: PSYCHIATRY & NEUROLOGY

## 2024-06-26 PROCEDURE — GZ59ZZZ INDIVIDUAL PSYCHOTHERAPY, PSYCHOPHYSIOLOGICAL: ICD-10-PCS | Performed by: PSYCHIATRY & NEUROLOGY

## 2024-06-26 PROCEDURE — 99223 1ST HOSP IP/OBS HIGH 75: CPT | Performed by: PSYCHIATRY & NEUROLOGY

## 2024-06-26 RX ORDER — RISPERIDONE 1 MG/1
1 TABLET ORAL
Status: DISCONTINUED | OUTPATIENT
Start: 2024-06-26 | End: 2024-07-02 | Stop reason: HOSPADM

## 2024-06-26 RX ORDER — PROPRANOLOL HYDROCHLORIDE 10 MG/1
5 TABLET ORAL EVERY 8 HOURS PRN
Status: DISCONTINUED | OUTPATIENT
Start: 2024-06-26 | End: 2024-07-02 | Stop reason: HOSPADM

## 2024-06-26 RX ORDER — FLUTICASONE PROPIONATE 50 MCG
1 SPRAY, SUSPENSION (ML) NASAL DAILY
Status: DISCONTINUED | OUTPATIENT
Start: 2024-06-26 | End: 2024-07-02 | Stop reason: HOSPADM

## 2024-06-26 RX ORDER — FLUTICASONE FUROATE AND VILANTEROL 100; 25 UG/1; UG/1
1 POWDER RESPIRATORY (INHALATION) DAILY
Status: DISCONTINUED | OUTPATIENT
Start: 2024-06-26 | End: 2024-07-02 | Stop reason: HOSPADM

## 2024-06-26 RX ORDER — CLOZAPINE 25 MG/1
50 TABLET ORAL DAILY
Status: DISCONTINUED | OUTPATIENT
Start: 2024-06-26 | End: 2024-07-02 | Stop reason: HOSPADM

## 2024-06-26 RX ORDER — LAMOTRIGINE 100 MG/1
100 TABLET ORAL 2 TIMES DAILY
Status: DISCONTINUED | OUTPATIENT
Start: 2024-06-26 | End: 2024-07-02 | Stop reason: HOSPADM

## 2024-06-26 RX ORDER — SIMETHICONE 80 MG
80 TABLET,CHEWABLE ORAL EVERY 6 HOURS PRN
Status: DISCONTINUED | OUTPATIENT
Start: 2024-06-26 | End: 2024-07-02 | Stop reason: HOSPADM

## 2024-06-26 RX ORDER — ACETAMINOPHEN 325 MG/1
650 TABLET ORAL EVERY 4 HOURS PRN
Status: DISCONTINUED | OUTPATIENT
Start: 2024-06-26 | End: 2024-07-02 | Stop reason: HOSPADM

## 2024-06-26 RX ORDER — POLYETHYLENE GLYCOL 3350 17 G/17G
17 POWDER, FOR SOLUTION ORAL DAILY
Status: DISCONTINUED | OUTPATIENT
Start: 2024-06-26 | End: 2024-07-02 | Stop reason: HOSPADM

## 2024-06-26 RX ORDER — RISPERIDONE 0.5 MG/1
0.5 TABLET ORAL
Status: DISCONTINUED | OUTPATIENT
Start: 2024-06-26 | End: 2024-07-02 | Stop reason: HOSPADM

## 2024-06-26 RX ORDER — GUAIFENESIN 600 MG/1
600 TABLET, EXTENDED RELEASE ORAL EVERY 12 HOURS SCHEDULED
Status: DISCONTINUED | OUTPATIENT
Start: 2024-06-26 | End: 2024-07-02 | Stop reason: HOSPADM

## 2024-06-26 RX ORDER — LANOLIN ALCOHOL/MO/W.PET/CERES
3 CREAM (GRAM) TOPICAL
Status: DISCONTINUED | OUTPATIENT
Start: 2024-06-26 | End: 2024-07-02 | Stop reason: HOSPADM

## 2024-06-26 RX ORDER — BENZONATATE 100 MG/1
100 CAPSULE ORAL 3 TIMES DAILY
Status: DISCONTINUED | OUTPATIENT
Start: 2024-06-26 | End: 2024-06-26

## 2024-06-26 RX ORDER — FOLIC ACID 1 MG/1
1 TABLET ORAL DAILY
Status: DISCONTINUED | OUTPATIENT
Start: 2024-06-26 | End: 2024-07-02 | Stop reason: HOSPADM

## 2024-06-26 RX ORDER — METHOCARBAMOL 500 MG/1
500 TABLET, FILM COATED ORAL EVERY 6 HOURS PRN
Status: DISCONTINUED | OUTPATIENT
Start: 2024-06-26 | End: 2024-07-02 | Stop reason: HOSPADM

## 2024-06-26 RX ORDER — HYDROXYZINE 50 MG/1
50 TABLET, FILM COATED ORAL
Status: DISCONTINUED | OUTPATIENT
Start: 2024-06-26 | End: 2024-07-02 | Stop reason: HOSPADM

## 2024-06-26 RX ORDER — HYDROCORTISONE 25 MG/G
CREAM TOPICAL 4 TIMES DAILY PRN
Status: DISCONTINUED | OUTPATIENT
Start: 2024-06-26 | End: 2024-07-02 | Stop reason: HOSPADM

## 2024-06-26 RX ORDER — ALBUTEROL SULFATE 90 UG/1
2 AEROSOL, METERED RESPIRATORY (INHALATION) EVERY 4 HOURS PRN
Status: DISCONTINUED | OUTPATIENT
Start: 2024-06-26 | End: 2024-07-02 | Stop reason: HOSPADM

## 2024-06-26 RX ORDER — HYDROXYZINE HYDROCHLORIDE 25 MG/1
25 TABLET, FILM COATED ORAL
Status: DISCONTINUED | OUTPATIENT
Start: 2024-06-26 | End: 2024-06-26

## 2024-06-26 RX ORDER — HALOPERIDOL 5 MG/ML
5 INJECTION INTRAMUSCULAR
Status: DISCONTINUED | OUTPATIENT
Start: 2024-06-26 | End: 2024-07-02 | Stop reason: HOSPADM

## 2024-06-26 RX ORDER — LORAZEPAM 0.5 MG/1
0.5 TABLET ORAL EVERY 8 HOURS PRN
Status: DISCONTINUED | OUTPATIENT
Start: 2024-06-26 | End: 2024-07-02 | Stop reason: HOSPADM

## 2024-06-26 RX ORDER — NICOTINE 21 MG/24HR
1 PATCH, TRANSDERMAL 24 HOURS TRANSDERMAL DAILY
Status: DISCONTINUED | OUTPATIENT
Start: 2024-06-26 | End: 2024-07-02 | Stop reason: HOSPADM

## 2024-06-26 RX ORDER — MELOXICAM 7.5 MG/1
15 TABLET ORAL DAILY
Status: DISCONTINUED | OUTPATIENT
Start: 2024-06-26 | End: 2024-07-02 | Stop reason: HOSPADM

## 2024-06-26 RX ORDER — ACETAMINOPHEN 325 MG/1
975 TABLET ORAL EVERY 6 HOURS PRN
Status: DISCONTINUED | OUTPATIENT
Start: 2024-06-26 | End: 2024-07-02 | Stop reason: HOSPADM

## 2024-06-26 RX ORDER — LANOLIN ALCOHOL/MO/W.PET/CERES
100 CREAM (GRAM) TOPICAL DAILY
Status: DISCONTINUED | OUTPATIENT
Start: 2024-06-26 | End: 2024-07-02 | Stop reason: HOSPADM

## 2024-06-26 RX ORDER — ATORVASTATIN CALCIUM 10 MG/1
10 TABLET, FILM COATED ORAL
Status: DISCONTINUED | OUTPATIENT
Start: 2024-06-26 | End: 2024-07-02 | Stop reason: HOSPADM

## 2024-06-26 RX ORDER — AMOXICILLIN 250 MG
1 CAPSULE ORAL 2 TIMES DAILY
Status: DISCONTINUED | OUTPATIENT
Start: 2024-06-26 | End: 2024-07-02 | Stop reason: HOSPADM

## 2024-06-26 RX ORDER — PANTOPRAZOLE SODIUM 40 MG/1
40 TABLET, DELAYED RELEASE ORAL
Status: DISCONTINUED | OUTPATIENT
Start: 2024-06-26 | End: 2024-07-02 | Stop reason: HOSPADM

## 2024-06-26 RX ORDER — RISPERIDONE 0.5 MG/1
0.5 TABLET ORAL 2 TIMES DAILY
Status: DISCONTINUED | OUTPATIENT
Start: 2024-06-26 | End: 2024-06-27

## 2024-06-26 RX ORDER — RISPERIDONE 0.25 MG/1
0.25 TABLET ORAL
Status: DISCONTINUED | OUTPATIENT
Start: 2024-06-26 | End: 2024-07-02 | Stop reason: HOSPADM

## 2024-06-26 RX ORDER — HYDROXYZINE 50 MG/1
50 TABLET, FILM COATED ORAL
Status: DISCONTINUED | OUTPATIENT
Start: 2024-06-26 | End: 2024-06-26

## 2024-06-26 RX ORDER — BENZONATATE 100 MG/1
100 CAPSULE ORAL 3 TIMES DAILY PRN
Status: DISCONTINUED | OUTPATIENT
Start: 2024-06-26 | End: 2024-06-29

## 2024-06-26 RX ORDER — LORAZEPAM 0.5 MG/1
0.5 TABLET ORAL 2 TIMES DAILY
Status: DISCONTINUED | OUTPATIENT
Start: 2024-06-26 | End: 2024-07-02 | Stop reason: HOSPADM

## 2024-06-26 RX ORDER — MAGNESIUM HYDROXIDE/ALUMINUM HYDROXICE/SIMETHICONE 120; 1200; 1200 MG/30ML; MG/30ML; MG/30ML
30 SUSPENSION ORAL EVERY 4 HOURS PRN
Status: DISCONTINUED | OUTPATIENT
Start: 2024-06-26 | End: 2024-07-02 | Stop reason: HOSPADM

## 2024-06-26 RX ORDER — TRAZODONE HYDROCHLORIDE 50 MG/1
50 TABLET ORAL
Status: DISCONTINUED | OUTPATIENT
Start: 2024-06-26 | End: 2024-07-02 | Stop reason: HOSPADM

## 2024-06-26 RX ADMIN — MELOXICAM 15 MG: 7.5 TABLET ORAL at 11:45

## 2024-06-26 RX ADMIN — LORAZEPAM 0.5 MG: 0.5 TABLET ORAL at 17:17

## 2024-06-26 RX ADMIN — LAMOTRIGINE 100 MG: 100 TABLET ORAL at 17:17

## 2024-06-26 RX ADMIN — SENNOSIDES AND DOCUSATE SODIUM 1 TABLET: 50; 8.6 TABLET ORAL at 17:25

## 2024-06-26 RX ADMIN — Medication 1 TABLET: at 10:47

## 2024-06-26 RX ADMIN — GUAIFENESIN 600 MG: 600 TABLET ORAL at 21:38

## 2024-06-26 RX ADMIN — CLOZAPINE 50 MG: 25 TABLET ORAL at 10:46

## 2024-06-26 RX ADMIN — PANTOPRAZOLE SODIUM 40 MG: 40 TABLET, DELAYED RELEASE ORAL at 11:44

## 2024-06-26 RX ADMIN — THIAMINE HCL TAB 100 MG 100 MG: 100 TAB at 10:46

## 2024-06-26 RX ADMIN — ATORVASTATIN CALCIUM 10 MG: 10 TABLET, FILM COATED ORAL at 17:17

## 2024-06-26 RX ADMIN — CLOZAPINE 350 MG: 100 TABLET ORAL at 21:33

## 2024-06-26 RX ADMIN — SENNOSIDES AND DOCUSATE SODIUM 1 TABLET: 50; 8.6 TABLET ORAL at 11:45

## 2024-06-26 RX ADMIN — LAMOTRIGINE 100 MG: 100 TABLET ORAL at 10:46

## 2024-06-26 RX ADMIN — FOLIC ACID 1 MG: 1 TABLET ORAL at 10:47

## 2024-06-26 RX ADMIN — RISPERIDONE 0.5 MG: 0.5 TABLET, FILM COATED ORAL at 10:47

## 2024-06-26 RX ADMIN — GUAIFENESIN 600 MG: 600 TABLET ORAL at 11:45

## 2024-06-26 RX ADMIN — POLYETHYLENE GLYCOL 3350 17 G: 17 POWDER, FOR SOLUTION ORAL at 11:45

## 2024-06-26 RX ADMIN — ACETAMINOPHEN 975 MG: 325 TABLET ORAL at 10:46

## 2024-06-26 RX ADMIN — LORAZEPAM 0.5 MG: 0.5 TABLET ORAL at 10:54

## 2024-06-26 RX ADMIN — NICOTINE 1 PATCH: 14 PATCH, EXTENDED RELEASE TRANSDERMAL at 14:26

## 2024-06-26 RX ADMIN — RISPERIDONE 0.5 MG: 0.5 TABLET, FILM COATED ORAL at 17:17

## 2024-06-26 RX ADMIN — FLUTICASONE PROPIONATE 1 SPRAY: 50 SPRAY, METERED NASAL at 11:44

## 2024-06-26 NOTE — EMTALA/ACUTE CARE TRANSFER
[facility] EMERGENCY DEPARTMENT  [address] [place] [address]  Dept: [phone]      EMTALA TRANSFER CONSENT    NAME Jo-Ann SHARIF 1970                              MRN [id number]    I have been informed of my rights regarding examination, treatment, and transfer   by Dr. Jeffery Correa MD    Benefits:      Risks:        Consent for Transfer:  I acknowledge that my medical condition has been evaluated and explained to me by the emergency department physician or other qualified medical person and/or my attending physician, who has recommended that I be transferred to the service of    at  . The above potential benefits of such transfer, the potential risks associated with such transfer, and the probable risks of not being transferred have been explained to me, and I fully understand them.  The doctor has explained that, in my case, the benefits of transfer outweigh the risks.  I agree to be transferred.    I authorize the performance of emergency medical procedures and treatments upon me in both transit and upon arrival at the receiving facility.  Additionally, I authorize the release of any and all medical records to the receiving facility and request they be transported with me, if possible.  I understand that the safest mode of transportation during a medical emergency is an ambulance and that the Hospital advocates the use of this mode of transport. Risks of traveling to the receiving facility by car, including absence of medical control, life sustaining equipment, such as oxygen, and medical personnel has been explained to me and I fully understand them.    (OSEI CORRECT BOX BELOW)  [  ]  I consent to the stated transfer and to be transported by ambulance/helicopter.  [  ]  I consent to the stated transfer, but refuse transportation by ambulance and accept full responsibility for my transportation by car.  I understand the risks of  non-ambulance transfers and I exonerate the Hospital and its staff from any deterioration in my condition that results from this refusal.    X___________________________________________    DATE  24  TIME________  Signature of patient or legally responsible individual signing on patient behalf           RELATIONSHIP TO PATIENT_________________________          Provider Certification    NAME Jo-Ann Lamb                                         1970                              MRN [id number]    A medical screening exam was performed on the above named patient.  Based on the examination:    Condition Necessitating Transfer The encounter diagnosis was Encounter for psychological evaluation.    Patient Condition:      Reason for Transfer:      Transfer Requirements: Facility     Space available and qualified personnel available for treatment as acknowledged by    Agreed to accept transfer and to provide appropriate medical treatment as acknowledged by          Appropriate medical records of the examination and treatment of the patient are provided at the time of transfer   STAFF INITIAL WHEN COMPLETED _______  Transfer will be performed by qualified personnel from    and appropriate transfer equipment as required, including the use of necessary and appropriate life support measures.    Provider Certification: I have examined the patient and explained the following risks and benefits of being transferred/refusing transfer to the patient/family:         Based on these reasonable risks and benefits to the patient and/or the unborn child(yeni), and based upon the information available at the time of the patient’s examination, I certify that the medical benefits reasonably to be expected from the provision of appropriate medical treatments at another medical facility outweigh the increasing risks, if any, to the individual’s medical condition, and in the case of labor to the unborn child, from effecting the  transfer.    X____________________________________________ DATE 06/26/24        TIME_______      ORIGINAL - SEND TO MEDICAL RECORDS   COPY - SEND WITH PATIENT DURING TRANSFER

## 2024-06-26 NOTE — ED NOTES
Crisis prepared 201 and obtained signatures.  Pt informed about her rights, 72 hours notice and inpatient psychiatric treatment process. Pt appears to be in understanding.    PT REQUESTED SLL.

## 2024-06-26 NOTE — PROGRESS NOTES
06/26/24 0982   Team Meeting   Meeting Type Daily Rounds   Team Members Present   Team Members Present Physician;Nurse;;Occupational Therapist   Physician Team Member Dr. Tisha MD; JARED Campbell; Joel Velasquez MD   Nursing Team Member Missy Hernandez, RN   Care Management Team Member Marium Wren, MS, NCC, LPC   OT Team Member Edna Green, CTRS   Patient/Family Present   Patient Present No   Patient's Family Present No   New admission 201, si with plan at time of admission, alert and oriented, AH, little anxiety and depression, denies si/hi, blood work.
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
## 2024-06-26 NOTE — ED NOTES
See previous [facility] Suicide Risk Assessment. Re-screening not required unless change in behavior or suicidal ideation.  Behavioral Health Assessment deferred as patient is sleeping and would benefit from additional rest.  Vital signs deferred until patient awake, no signs or symptoms of respiratory distress at this time.    Once patient is awake and able to participate, will complete assessments.       Jeremy Sorensen RN  06/25/24 2000

## 2024-06-26 NOTE — NURSING NOTE
Patient has been withdrawn to her room most of the shift. She comes out for meals and then returns to her room. She is medication compliant. She slept well last night. She was calm and cooperative during interaction this morning. Endorsed a little and anxiety and depression. Endorsed auditory hallucinations of a little chitter chatter . Denied SI or HI. Safety precautions maintained.

## 2024-06-26 NOTE — PLAN OF CARE
Problem: Ineffective Coping  Goal: Participates in unit activities  Description: Interventions:  - Provide therapeutic environment   - Provide required programming   - Redirect inappropriate behaviors   Outcome: Progressing   Patient got to unit late and was able to join group this morning openly sharing with staff.

## 2024-06-26 NOTE — ED NOTES
Patient is accepted at [facility] Older Adult  Patient is accepted by Dr.Qureshi paz GILMORE Intake/Crisis    Transportation is arranged with [facility].     Transportation is scheduled for TBD  Patient may go to the floor at [facility]        Nurse report is to be called to [phone] prior to patient transfer.    Insurance pre-cert will need to be completed.

## 2024-06-26 NOTE — TELEPHONE ENCOUNTER
Marium from [facility] Called to advise us that Jo-Ann was admitted today on the Older Adult Behavior Floor.      Please contact if you need to do so.

## 2024-06-26 NOTE — ED NOTES
"Jo-Ann Lamb is a 55 y/o female, diagnosed with   Schizoaffective disorder, bipolar type who presented to ED due to:   Patient presenting to ED via EMS. Patient rambling during triage stating, \"My enemy is murdering me. I hate them bitches. I hate scary nights.\" Also stating, \"The midgets are coming in to my house and poisoning me.\" Patient reporting suicidal ideations reporting she was trying to drink herself to death today. Reports approx 4 drinks today. Patient reporting HI towards strangers entering her house. Hx of schizophrenia and bipolar disorder. Per EMS, patient has been non compliant with meds.   Pt calm and cooperative. Pt oriented x4. Pt currently lives in independent living facility. Pt presented to ED intoxicated. Pt reports drinking occasionally. Pt reports feeling suicidal with the intent but no current plan. Pt states \"if I do it, I do it, don't need a plan\". Pt reports past suicide attempt but could not recall as to when. Pt denies any particular stressors. Pt denies homicidal ideations. No self harming behavior. Pt states she hears voices, but denies command nature. Pt also sees shadows and people that are no longer alive. No appetite or sleep problems. Pt unable to contract for safety at this time.  Treatment discussed. Pt willing to sign 201. Attending in agreement.  "

## 2024-06-26 NOTE — ED NOTES
Insurance Authorization for admission:   Phone call placed to [facility].  Phone number: [phone].     Spoke to Mireya LIEBERMAN     03 days approved.  Level of care: Inpatient Behavioral Health (201).  Review on: 28th  Authorization # [id number].        Eligibility Verification System checked - ([phone]).  Online system / automated system indicates:     [id number]06/26/202406/26/2024  [facility]06/26/202406/26/2024    Insurance Authorization for Transportation:    Phone call placed to **.   Phone number **.   Spoke to **.   Authorization #: **

## 2024-06-26 NOTE — PLAN OF CARE
Problem: Alteration in Thoughts and Perception  Goal: Treatment Goal: Gain control of psychotic behaviors/thinking, reduce/eliminate presenting symptoms and demonstrate improved reality functioning upon discharge  Outcome: Progressing  Goal: Refrain from acting on delusional thinking/internal stimuli  Description: Interventions:  - Monitor patient closely, per order   - Utilize least restrictive measures   - Set reasonable limits, give positive feedback for acceptable   - Administer medications as ordered and monitor of potential side effects  Outcome: Progressing  Goal: Recognize dysfunctional thoughts, communicate reality-based thoughts at the time of discharge  Description: Interventions:  - Provide medication and psycho-education to assist patient in compliance and developing insight into his/her illness   Outcome: Progressing     Problem: Depression  Goal: Treatment Goal: Demonstrate behavioral control of depressive symptoms, verbalize feelings of improved mood/affect, and adopt new coping skills prior to discharge  Outcome: Progressing  Goal: Refrain from harming self  Description: Interventions:  - Monitor patient closely, per order   - Supervise medication ingestion, monitor effects and side effects   Outcome: Progressing  Goal: Refrain from self-neglect  Outcome: Progressing     Problem: Anxiety  Goal: Anxiety is at manageable level  Description: Interventions:  - Assess and monitor patient's anxiety level.   - Monitor for signs and symptoms (heart palpitations, chest pain, shortness of breath, headaches, nausea, feeling jumpy, restlessness, irritable, apprehensive).   - Collaborate with interdisciplinary team and initiate plan and interventions as ordered.  - [place] patient to unit/surroundings  - Explain treatment plan  - Encourage participation in care  - Encourage verbalization of concerns/fears  - Identify coping mechanisms  - Assist in developing anxiety-reducing skills  - Administer/offer  alternative therapies  - Limit or eliminate stimulants  Outcome: Progressing     Problem: Risk for Self Injury/Neglect  Goal: Treatment Goal: Remain safe during length of stay, learn and adopt new coping skills, and be free of self-injurious ideation, impulses and acts at the time of discharge  Outcome: Progressing  Goal: Refrain from harming self  Description: Interventions:  - Monitor patient closely, per order  - Develop a trusting relationship  - Supervise medication ingestion, monitor effects and side effects   Outcome: Progressing  Goal: Recognize maladaptive responses and adopt new coping mechanisms  Outcome: Progressing
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
## 2024-06-26 NOTE — NURSING NOTE
Administered 0.5 mg ativan for increased anxiety . Martines score 25. Patient resting in her room and appeared relaxed. Ativan effective.

## 2024-06-26 NOTE — ED NOTES
[facility]    ID# [id number]    [id number]/26/2024 06/26/2024  [facility] 06/26/2024 06/26/2024

## 2024-06-26 NOTE — PROGRESS NOTES
06/26/24 1115   Team Meeting   Meeting Type Tx Team Meeting   Team Members Present   Team Members Present Physician;;Nurse   Physician Team Member MD Tammy   Nursing Team Member Missy Hernandez, RN   Care Management Team Member Marium Hanna, MS, NCC, LPC   Patient/Family Present   Patient Present Yes   Patient's Family Present No     PT met with TX team, reviewed TX plan and goals, all in agreement and signed.

## 2024-06-26 NOTE — TREATMENT PLAN
TREATMENT PLAN REVIEW - Behavioral Health Jo-Ann Lamb 54 y.o. 1970 female MRN: [id number]    [facility] Room / Bed: [facility] 205/[facility] 205-02 Encounter: [id number]          Admit Date/Time:  6/26/2024  1:50 AM    Treatment Team:   MD Odessa Mcgill LPC Nicole L Saas Paul F Klose, RN Celeste Pawling Tammy Moretz Denise Rehrig, YUDY Wren, MS  Shakir Hillsubha    Diagnosis: Principal Problem:    Schizoaffective disorder, bipolar type ([facility])  Active Problems:    Degenerative disc disease, lumbar    Hyperlipidemia    Post-traumatic stress disorder, chronic    Gastroesophageal reflux disease    Chronic obstructive pulmonary disease (HCC)      Patient Strengths/Assets: negotiates basic needs, patient is on a voluntary commitment    Patient Barriers/Limitations: limited family ties, poor insight, poor reasoning ability, substance abuse    Short Term Goals: decrease in depressive symptoms, decrease in anxiety symptoms, decrease in psychotic symptoms, decrease in suicidal thoughts, ability to stay safe on the unit, improvement in reasoning ability, improvement in self care, sleep improvement, improvement in appetite, mood stabilization, increase in group attendance, increase in socialization with peers on the unit, acceptance of need for psychiatric treatment, acceptance of psychiatric medications    Long Term Goals: improvement in depression, improvement in anxiety, stabilization of mood, free of suicidal thoughts, improvement in reasoning ability, improved insight, acceptance of need for psychiatric medications, acceptance of need for psychiatric treatment, adequate self care, adequate sleep, adequate appetite, adequate oral intake, appropriate interaction with peers    Progress Towards Goals: starting psychiatric medications as prescribed    Recommended Treatment: medication management, patient medication education, group therapy, milieu therapy,  continued Behavioral Health psychiatric evaluation/assessment process, medical follow up with medical team    Treatment Frequency: daily medication monitoring, group and milieu therapy daily, monitoring through interdisciplinary rounds, monitoring through weekly patient care conferences    Expected Discharge Date: 10 midnights     Discharge Plan: will be determined    Treatment Plan Created/Updated By: Jayson Giles MD
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
## 2024-06-26 NOTE — PROGRESS NOTES
Discussed with patient: AUDIT score of 2 UDS/Identified Substance(s) used: alcohol   Risks discussed included: physical and mental health   Recommendations discussed: inpatient and outpatient treatment  Patient's response: declined
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
## 2024-06-26 NOTE — SOCIAL WORK
OLIVE placed call to [facility] to notify of PT admission. [phone]. Left message requesting return call.     OLIVE placed call to PCP Dr. Lew, [phone] to notify of PT admission. Spoke with Nuvia she will update team. Call ended mutually.

## 2024-06-26 NOTE — NURSING NOTE
Patient adjusting well to unit. Slept well during most q 7 minute safety checks.  No respiratory distress or changes in medical condition.  Sleep has been sound and undisturbed.  No suicidal ideations noted.  Safety maintained via clutter-free environment, ID band, and given non-skid socks.  Will continue to monitor.

## 2024-06-27 LAB
25(OH)D3 SERPL-MCNC: 39.5 NG/ML (ref 30–100)
ALBUMIN SERPL BCG-MCNC: 4.2 G/DL (ref 3.5–5)
ALP SERPL-CCNC: 81 U/L (ref 34–104)
ALT SERPL W P-5'-P-CCNC: 17 U/L (ref 7–52)
ANION GAP SERPL CALCULATED.3IONS-SCNC: 9 MMOL/L (ref 4–13)
AST SERPL W P-5'-P-CCNC: 15 U/L (ref 13–39)
ATRIAL RATE: 79 BPM
BASOPHILS # BLD AUTO: 0.02 THOUSANDS/ÂΜL (ref 0–0.1)
BASOPHILS NFR BLD AUTO: 0 % (ref 0–1)
BILIRUB SERPL-MCNC: 0.29 MG/DL (ref 0.2–1)
BUN SERPL-MCNC: 9 MG/DL (ref 5–25)
CALCIUM SERPL-MCNC: 9.6 MG/DL (ref 8.4–10.2)
CHLORIDE SERPL-SCNC: 103 MMOL/L (ref 96–108)
CHOLEST SERPL-MCNC: 188 MG/DL
CO2 SERPL-SCNC: 27 MMOL/L (ref 21–32)
CREAT SERPL-MCNC: 0.62 MG/DL (ref 0.6–1.3)
EOSINOPHIL # BLD AUTO: 0.47 THOUSAND/ÂΜL (ref 0–0.61)
EOSINOPHIL NFR BLD AUTO: 5 % (ref 0–6)
ERYTHROCYTE [DISTWIDTH] IN BLOOD BY AUTOMATED COUNT: 14.1 % (ref 11.6–15.1)
FOLATE SERPL-MCNC: >22.3 NG/ML
GFR SERPL CREATININE-BSD FRML MDRD: 103 ML/MIN/1.73SQ M
GLUCOSE SERPL-MCNC: 145 MG/DL (ref 65–140)
HCT VFR BLD AUTO: 43.3 % (ref 34.8–46.1)
HDLC SERPL-MCNC: 62 MG/DL
HGB BLD-MCNC: 14 G/DL (ref 11.5–15.4)
IMM GRANULOCYTES # BLD AUTO: 0.05 THOUSAND/UL (ref 0–0.2)
IMM GRANULOCYTES NFR BLD AUTO: 1 % (ref 0–2)
LDLC SERPL CALC-MCNC: 83 MG/DL (ref 0–100)
LYMPHOCYTES # BLD AUTO: 1.77 THOUSANDS/ÂΜL (ref 0.6–4.47)
LYMPHOCYTES NFR BLD AUTO: 19 % (ref 14–44)
MCH RBC QN AUTO: 30.1 PG (ref 26.8–34.3)
MCHC RBC AUTO-ENTMCNC: 32.3 G/DL (ref 31.4–37.4)
MCV RBC AUTO: 93 FL (ref 82–98)
MONOCYTES # BLD AUTO: 0.57 THOUSAND/ÂΜL (ref 0.17–1.22)
MONOCYTES NFR BLD AUTO: 6 % (ref 4–12)
NEUTROPHILS # BLD AUTO: 6.28 THOUSANDS/ÂΜL (ref 1.85–7.62)
NEUTS SEG NFR BLD AUTO: 69 % (ref 43–75)
NONHDLC SERPL-MCNC: 126 MG/DL
NRBC BLD AUTO-RTO: 0 /100 WBCS
P AXIS: 45 DEGREES
PLATELET # BLD AUTO: 310 THOUSANDS/UL (ref 149–390)
PMV BLD AUTO: 9.5 FL (ref 8.9–12.7)
POTASSIUM SERPL-SCNC: 4.1 MMOL/L (ref 3.5–5.3)
PR INTERVAL: 158 MS
PROT SERPL-MCNC: 7.1 G/DL (ref 6.4–8.4)
QRS AXIS: 9 DEGREES
QRSD INTERVAL: 72 MS
QT INTERVAL: 406 MS
QTC INTERVAL: 465 MS
RBC # BLD AUTO: 4.65 MILLION/UL (ref 3.81–5.12)
SODIUM SERPL-SCNC: 139 MMOL/L (ref 135–147)
T WAVE AXIS: 51 DEGREES
TREPONEMA PALLIDUM IGG+IGM AB [PRESENCE] IN SERUM OR PLASMA BY IMMUNOASSAY: NORMAL
TRIGL SERPL-MCNC: 216 MG/DL
VENTRICULAR RATE: 79 BPM
VIT B12 SERPL-MCNC: 490 PG/ML (ref 180–914)
WBC # BLD AUTO: 9.16 THOUSAND/UL (ref 4.31–10.16)

## 2024-06-27 PROCEDURE — 86780 TREPONEMA PALLIDUM: CPT | Performed by: PSYCHIATRY & NEUROLOGY

## 2024-06-27 PROCEDURE — 93010 ELECTROCARDIOGRAM REPORT: CPT | Performed by: INTERNAL MEDICINE

## 2024-06-27 PROCEDURE — 80061 LIPID PANEL: CPT | Performed by: PSYCHIATRY & NEUROLOGY

## 2024-06-27 PROCEDURE — 82746 ASSAY OF FOLIC ACID SERUM: CPT | Performed by: PSYCHIATRY & NEUROLOGY

## 2024-06-27 PROCEDURE — 93005 ELECTROCARDIOGRAM TRACING: CPT

## 2024-06-27 PROCEDURE — 80053 COMPREHEN METABOLIC PANEL: CPT | Performed by: PSYCHIATRY & NEUROLOGY

## 2024-06-27 PROCEDURE — 99232 SBSQ HOSP IP/OBS MODERATE 35: CPT | Performed by: PSYCHIATRY & NEUROLOGY

## 2024-06-27 PROCEDURE — 82306 VITAMIN D 25 HYDROXY: CPT | Performed by: PSYCHIATRY & NEUROLOGY

## 2024-06-27 PROCEDURE — 85025 COMPLETE CBC W/AUTO DIFF WBC: CPT | Performed by: PSYCHIATRY & NEUROLOGY

## 2024-06-27 PROCEDURE — 82607 VITAMIN B-12: CPT | Performed by: PSYCHIATRY & NEUROLOGY

## 2024-06-27 RX ORDER — RISPERIDONE 0.5 MG/1
0.5 TABLET ORAL 3 TIMES DAILY
Status: DISCONTINUED | OUTPATIENT
Start: 2024-06-27 | End: 2024-07-02 | Stop reason: HOSPADM

## 2024-06-27 RX ADMIN — GUAIFENESIN 600 MG: 600 TABLET ORAL at 20:41

## 2024-06-27 RX ADMIN — NICOTINE 1 PATCH: 14 PATCH, EXTENDED RELEASE TRANSDERMAL at 08:45

## 2024-06-27 RX ADMIN — NYSTATIN 500000 UNITS: 100000 SUSPENSION ORAL at 20:39

## 2024-06-27 RX ADMIN — RISPERIDONE 0.5 MG: 0.5 TABLET, FILM COATED ORAL at 20:39

## 2024-06-27 RX ADMIN — LAMOTRIGINE 100 MG: 100 TABLET ORAL at 08:47

## 2024-06-27 RX ADMIN — FOLIC ACID 1 MG: 1 TABLET ORAL at 08:47

## 2024-06-27 RX ADMIN — ATORVASTATIN CALCIUM 10 MG: 10 TABLET, FILM COATED ORAL at 15:53

## 2024-06-27 RX ADMIN — FLUTICASONE FUROATE AND VILANTEROL TRIFENATATE 1 PUFF: 100; 25 POWDER RESPIRATORY (INHALATION) at 08:43

## 2024-06-27 RX ADMIN — THIAMINE HCL TAB 100 MG 100 MG: 100 TAB at 08:45

## 2024-06-27 RX ADMIN — LORAZEPAM 0.5 MG: 0.5 TABLET ORAL at 17:08

## 2024-06-27 RX ADMIN — RISPERIDONE 0.5 MG: 0.5 TABLET, FILM COATED ORAL at 08:47

## 2024-06-27 RX ADMIN — CLOZAPINE 350 MG: 100 TABLET ORAL at 20:41

## 2024-06-27 RX ADMIN — FLUTICASONE PROPIONATE 1 SPRAY: 50 SPRAY, METERED NASAL at 08:43

## 2024-06-27 RX ADMIN — PANTOPRAZOLE SODIUM 40 MG: 40 TABLET, DELAYED RELEASE ORAL at 08:45

## 2024-06-27 RX ADMIN — CLOZAPINE 50 MG: 25 TABLET ORAL at 08:45

## 2024-06-27 RX ADMIN — RISPERIDONE 0.5 MG: 0.5 TABLET, FILM COATED ORAL at 10:25

## 2024-06-27 RX ADMIN — RISPERIDONE 0.5 MG: 0.5 TABLET, FILM COATED ORAL at 15:54

## 2024-06-27 RX ADMIN — NYSTATIN 500000 UNITS: 100000 SUSPENSION ORAL at 12:12

## 2024-06-27 RX ADMIN — LORAZEPAM 0.5 MG: 0.5 TABLET ORAL at 21:46

## 2024-06-27 RX ADMIN — LAMOTRIGINE 100 MG: 100 TABLET ORAL at 17:08

## 2024-06-27 RX ADMIN — GUAIFENESIN 600 MG: 600 TABLET ORAL at 08:46

## 2024-06-27 RX ADMIN — Medication 1000 UNITS: at 08:45

## 2024-06-27 RX ADMIN — SENNOSIDES AND DOCUSATE SODIUM 1 TABLET: 50; 8.6 TABLET ORAL at 08:45

## 2024-06-27 RX ADMIN — SENNOSIDES AND DOCUSATE SODIUM 1 TABLET: 50; 8.6 TABLET ORAL at 17:08

## 2024-06-27 RX ADMIN — POLYETHYLENE GLYCOL 3350 17 G: 17 POWDER, FOR SOLUTION ORAL at 08:45

## 2024-06-27 RX ADMIN — NYSTATIN 500000 UNITS: 100000 SUSPENSION ORAL at 17:10

## 2024-06-27 RX ADMIN — LORAZEPAM 0.5 MG: 0.5 TABLET ORAL at 08:45

## 2024-06-27 RX ADMIN — Medication 1 TABLET: at 08:45

## 2024-06-27 NOTE — PROGRESS NOTES
"Progress Note - Jo-Ann Lamb 54 y.o. female MRN: [id number]    Unit/Bed#: [facility] 205-02 Encounter: [id number]        Subjective:   Patient seen and examined at bedside after reviewing the chart and discussing the case with the caring staff.      Patient examined at bedside.  Patient states she feels she has thrush due to her inhalers.  States she has white film on tongue which she cannot brush off.      Physical Exam   Vitals: Blood pressure 142/80, pulse 81, temperature 98.6 °F (37 °C), temperature source Temporal, resp. rate 18, height 5' 5\" (1.651 m), weight 70 kg (154 lb 6.4 oz), last menstrual period 01/01/2020, SpO2 93%, not currently breastfeeding.,Body mass index is 25.69 kg/m².  Constitutional: Patient in no acute distress.  HEENT: PERR, EOMI, MMM.  Cardiovascular: Normal rate and regular rhythm.    Pulmonary/Chest: Effort normal and breath sounds normal.   Abdomen: Soft, + BS, NT.    Assessment/Plan:  Jo-Ann Lamb is a(n) 54 y.o. female with schizoaffective disorder.     Hyperlipidemia.  Continue atorvastatin 10 mg daily.  Asthma.  Patient is on Breo Ellipta daily (Advair nonform), albuterol inhaler as needed.   Allergic rhinitis.  Continue Flonase nasal spray daily.   GERD.  Patient on Protonix 40 mg daily.   Tobacco abuse.  NRT.   Alcohol abuse.  Patient started on thiamine, folic acid, MVI.   Chronic back pain.  Continue Mobic 15 mg daily, Robaxin as needed.  Vitamin D deficiency.  Continue vitamin D3 1000 units daily.  Constipation/hemorrhoids.  Continue senokot S twice daily and Miralax daily.  Apply Anusol-HC as needed.   Cough/congestion/sore throat.  Started on Mucinex twice daily.  Tessalon perles and chloraseptic spray as needed.  Thrush.  Patient started on nystatin swish QID x 10 days.  Discussed rinsing mouth thoroughly after using an inhaler.     The patient was discussed with Dr. Steel and he is in agreement with the above note.  "

## 2024-06-27 NOTE — PLAN OF CARE
Problem: Alteration in Thoughts and Perception  Goal: Treatment Goal: Gain control of psychotic behaviors/thinking, reduce/eliminate presenting symptoms and demonstrate improved reality functioning upon discharge  Outcome: Progressing  Goal: Refrain from acting on delusional thinking/internal stimuli  Description: Interventions:  - Monitor patient closely, per order   - Utilize least restrictive measures   - Set reasonable limits, give positive feedback for acceptable   - Administer medications as ordered and monitor of potential side effects  Outcome: Progressing  Goal: Recognize dysfunctional thoughts, communicate reality-based thoughts at the time of discharge  Description: Interventions:  - Provide medication and psycho-education to assist patient in compliance and developing insight into his/her illness   Outcome: Progressing     Problem: Depression  Goal: Treatment Goal: Demonstrate behavioral control of depressive symptoms, verbalize feelings of improved mood/affect, and adopt new coping skills prior to discharge  Outcome: Progressing  Goal: Refrain from harming self  Description: Interventions:  - Monitor patient closely, per order   - Supervise medication ingestion, monitor effects and side effects   Outcome: Progressing  Goal: Refrain from self-neglect  Outcome: Progressing     Problem: Anxiety  Goal: Anxiety is at manageable level  Description: Interventions:  - Assess and monitor patient's anxiety level.   - Monitor for signs and symptoms (heart palpitations, chest pain, shortness of breath, headaches, nausea, feeling jumpy, restlessness, irritable, apprehensive).   - Collaborate with interdisciplinary team and initiate plan and interventions as ordered.  - [place] patient to unit/surroundings  - Explain treatment plan  - Encourage participation in care  - Encourage verbalization of concerns/fears  - Identify coping mechanisms  - Assist in developing anxiety-reducing skills  - Administer/offer  alternative therapies  - Limit or eliminate stimulants  Outcome: Progressing     Problem: Risk for Self Injury/Neglect  Goal: Treatment Goal: Remain safe during length of stay, learn and adopt new coping skills, and be free of self-injurious ideation, impulses and acts at the time of discharge  Outcome: Progressing  Goal: Refrain from harming self  Description: Interventions:  - Monitor patient closely, per order  - Develop a trusting relationship  - Supervise medication ingestion, monitor effects and side effects   Outcome: Progressing  Goal: Recognize maladaptive responses and adopt new coping mechanisms  Outcome: Progressing     Problem: SUBSTANCE USE/ABUSE  Goal: By discharge, will develop insight into their chemical dependency and sustain motivation to continue in recovery  Description: INTERVENTIONS:  - Attends all daily group sessions and scheduled AA groups  - Actively practices coping skills through participation in the therapeutic community and adherence to program rules  - Reviews and completes assignments from individual treatment plan  - Assist patient development of understanding of their personal cycle of addiction and relapse triggers  Outcome: Progressing  Goal: By discharge, patient will have ongoing treatment plan addressing chemical dependency  Description: INTERVENTIONS:  - Assist patient with resources and/or appointments for ongoing recovery based living  Outcome: Progressing     Problem: DISCHARGE PLANNING - CARE MANAGEMENT  Goal: Discharge to post-acute care or home with appropriate resources  Description: INTERVENTIONS:  - Conduct assessment to determine patient/family and health care team treatment goals, and need for post-acute services based on payer coverage, community resources, and patient preferences, and barriers to discharge  - Address psychosocial, clinical, and financial barriers to discharge as identified in assessment in conjunction with the patient/family and health care  team  - Arrange appropriate level of post-acute services according to patient’s   needs and preference and payer coverage in collaboration with the physician and health care team  - Communicate with and update the patient/family, physician, and health care team regarding progress on the discharge plan  - Arrange appropriate transportation to post-acute venues  Outcome: Progressing

## 2024-06-27 NOTE — NURSING NOTE
Administered 0.5 mg risperidal  at 10:26 am for increased agitation. Agitation scale 31. Patient was yelling in her room and came out to the desk asking for something to help her with her outbursts. Patient was just yelling in her room. She stated I am Not well. Patient appears calmer and came out to the acharya to use the phone. Risperidol appears effective.

## 2024-06-27 NOTE — NURSING NOTE
Patient withdrawn to room tonight.  Assessment completed, anxiety and depression stated as moderate. States she feels slightly better. Denied suicidal ideations and hallucinations. Affect is flat.  Appears depressed. No acute behaviors.  Was compliant with medications.  Medication education completed.   Safe environment provided for safety and fall prevention.  Maintained on q 7 minute checks.  Fluids at bedside to promote hydration, offered during interactions.  Will continue to monitor.

## 2024-06-27 NOTE — NURSING NOTE
"Patient was heard in her room yelling\" bitch. \". Nurse went to talk with her and she was delusional stating her face was messed up from the botox and surgery she had here. She asked why she couldn't see until after her shower this morning. She had her morning medications which included ativan and risperidol about 1 hour ago. She wants to talk to the Dr after his morning meeting . She wants to leave. At breakfast patient was sitting in dining room quietly. She was calm and cooperative at that time. She had endorsed anxiety then. Denied depression,SI,HI, or hallucinations during interaction in dining room. Safety precautions maintained.   "

## 2024-06-27 NOTE — NURSING NOTE
Presents with baseline affect,brightens upon approach:denies depression,anxiety,SI,HI,AH,VH.Jo-Ann is either a poor historian or just making statements she believes is what staff   wants to be hear,as she has given conflicting responses as supported by denying psychoses recently to this writer ,which refutes her behaviors earlier today.She divides her free time between her room and pacing hallway,she is compliant with medications,makes needs known,appetite is adequate,follows unit rules,and is independent with ambulation and unit rules.We discussed the s/s of impending psychological decompensation and when to seek assistance.Will continue to educate,monitor,and provide safe,therapeutic milieu.

## 2024-06-27 NOTE — PROGRESS NOTES
06/27/24 1208   Activity/Group Checklist   Group Admission/Discharge   Attendance Attended   Attendance Duration (min) 0-15   Interactions Interacted appropriately   Affect/Mood Appropriate   Goals Achieved Identified feelings;Identified triggers;Discussed coping strategies;Able to listen to others;Able to engage in interactions;Able to reflect/comment on own behavior;Able to manage/cope with feelings;Able to self-disclose;Able to recieve feedback     Patient agreeable to meet and complete self assessment with [facility].  Patient information from form can be found in media.

## 2024-06-27 NOTE — PROGRESS NOTES
Progress Note - Behavioral Health     Jo-Ann Lamb 54 y.o. female MRN: [id number]   Unit/Bed#: [facility] 205-02 Encounter: [id number]    Behavior over the last 24 hours: minimal improvement.     Jo-Ann CUMMINGS seen today, appears calm her with her respiratory rate. Patient reports she is feeling better after lunch but she was very anxious and scared, requiring as needed Risperidone earlier this morning. Remains isolated and admit to chronic auditory hallucination and paranoia but denies any active suicidal ideation presently. She has been compliant with medication and denies any current side effects. She agreed to increase her Risperidone doses to three times a day. Staff report limited participation in groups and on the unit.    Sleep: slept better  Appetite: normal  Medication side effects: denies  ROS: no complaints, all other systems are negative    Mental Status Evaluation:    Appearance:  age appropriate   Behavior:  guarded   Speech:  normal rate and volume   Mood:  anxious   Affect:  constricted   Thought Process:  goal directed   Associations: perseveration   Thought Content:  some paranoia   Perceptual Disturbances: appears preoccupied   Risk Potential: Suicidal ideation - None  Homicidal ideation - None   Sensorium:  oriented to person, place, and time/date   Memory:  recent and remote memory grossly intact   Consciousness:  alert and awake   Attention: attention span and concentration appear shorter than expected for age   Insight:  limited   Judgment: limited   Gait/Station: normal gait/station   Motor Activity: no abnormal movements     Vital signs in last 24 hours:    Temp:  [97.2 °F (36.2 °C)-98.6 °F (37 °C)] 98.6 °F (37 °C)  HR:  [64-81] 81  Resp:  [18] 18  BP: (142-145)/(75-80) 142/80    Laboratory results: I have personally reviewed all pertinent laboratory/tests results.  Most Recent Labs:   Lab Results   Component Value Date    WBC 9.16 06/27/2024    RBC 4.65 06/27/2024    HGB 14.0 06/27/2024    HCT 43.3  06/27/2024     06/27/2024    RDW 14.1 06/27/2024    NEUTROABS 6.28 06/27/2024    SODIUM 139 06/27/2024    K 4.1 06/27/2024     06/27/2024    CO2 27 06/27/2024    BUN 9 06/27/2024    CREATININE 0.62 06/27/2024    GLUC 145 (H) 06/27/2024    GLUF 88 03/23/2024    CALCIUM 9.6 06/27/2024    AST 15 06/27/2024    ALT 17 06/27/2024    ALKPHOS 81 06/27/2024    TP 7.1 06/27/2024    ALB 4.2 06/27/2024    TBILI 0.29 06/27/2024    CHOLESTEROL 188 06/27/2024    HDL 62 06/27/2024    TRIG 216 (H) 06/27/2024    LDLCALC 83 06/27/2024    NONHDLC 126 06/27/2024    AMMONIA 28 10/27/2017    GSW2LZBNLDMK 1.162 06/25/2024    FREET4 0.89 03/24/2016    PREGUR Negative 11/07/2022    PREGSERUM Negative 01/12/2024    HCG <2.00 04/10/2014    RPR Non-Reactive 12/07/2022    HGBA1C 5.6 12/02/2022     12/02/2022       Assessment & Plan   Principal Problem:    Schizoaffective disorder, bipolar type (HCC)  Active Problems:    Degenerative disc disease, lumbar    Hyperlipidemia    Post-traumatic stress disorder, chronic    Gastroesophageal reflux disease    Chronic obstructive pulmonary disease (HCC)    Recommended Treatment:     Planned medication and treatment changes:    All current active medications have been reviewed  Encourage group therapy, milieu therapy and occupational therapy  Behavioral Health checks every 7 minutes  Increase Risperidal 0.5 mg po tid     Current Facility-Administered Medications   Medication Dose Route Frequency Provider Last Rate    acetaminophen  650 mg Oral Q4H PRN Taylor Castro MD      acetaminophen  650 mg Oral Q4H PRN Taylor Castro MD      acetaminophen  975 mg Oral Q6H PRN Taylor Castro MD      albuterol  2 puff Inhalation Q4H PRN Payton Conrad PA-C      aluminum-magnesium hydroxide-simethicone  30 mL Oral Q4H PRN Taylor Castro MD      atorvastatin  10 mg Oral Daily With Dinner Payton Conrad PA-C      benzonatate  100 mg Oral TID PRN Payton Conrad PA-C       Cholecalciferol  1,000 Units Oral QAM Payton L Dagnall, PA-C      cloZAPine  350 mg Oral HS Jayson Giles MD      cloZAPine  50 mg Oral Daily Jayson Giles MD      fluticasone  1 spray Nasal Daily Payton L Dagnall, PA-C      Fluticasone Furoate-Vilanterol  1 puff Inhalation Daily Payton L Dagnall, PA-C      folic acid  1 mg Oral Daily Payton L Dagnall, PA-C      guaiFENesin  600 mg Oral Q12H KENNY Payton L Dagnall, PA-C      haloperidol lactate  5 mg Intramuscular Q4H PRN Max 4/day Taylor Castro MD      hydrocortisone   Topical 4x Daily PRN Payton L Dagnall, PA-C      hydrOXYzine HCL  50 mg Oral Q6H PRN Max 4/day Jayson Giles MD      hydrOXYzine HCL  75 mg Oral Q6H PRN Max 4/day Jayson Giles MD      lamoTRIgine  100 mg Oral BID Jayson Giles MD      LORazepam  0.5 mg Oral Q8H PRN Jayson Giles MD      LORazepam  0.5 mg Oral BID Jayson Giles MD      melatonin  3 mg Oral HS Taylor Castro MD      meloxicam  15 mg Oral Daily Payton L Dagnall, PA-C      methocarbamol  500 mg Oral Q6H PRN Payton L Dagnall, PA-C      multivitamin-minerals  1 tablet Oral Daily Payton L Dagnall, PA-C      nicotine  1 patch Transdermal Daily JARED Carson      nicotine polacrilex  4 mg Oral Q2H PRN Taylor Castro MD      pantoprazole  40 mg Oral Daily Before Breakfast Payton L Dagnall, PA-C      phenol  1 spray Mouth/Throat Q2H PRN Payton L Dagnall, PA-C      polyethylene glycol  17 g Oral Daily Payton L Dagnall, PA-C      propranolol  5 mg Oral Q8H PRN Taylor Castro MD      risperiDONE  0.25 mg Oral Q4H PRN Max 6/day Taylor Castro MD      risperiDONE  0.5 mg Oral Q4H PRN Max 3/day Taylor Castro MD      risperiDONE  0.5 mg Oral BID Jayson Giles MD      risperiDONE  1 mg Oral Q2H PRN Max 3/day Taylor Castro MD      senna-docusate sodium  1 tablet Oral BID Payton Conrad PA-C      simethicone  80 mg Oral Q6H PRN Payton Conrad PA-C      thiamine  100 mg Oral Daily Payton Conrad PA-C       traZODone  50 mg Oral Q6H PRN Max 3/day Taylor Castro MD         Risks / Benefits of Treatment:    Risks, benefits, and possible side effects of medications explained to patient and patient verbalizes understanding and agreement for treatment.    Counseling / Coordination of Care:    Patient's progress discussed with staff in treatment team meeting.  Medications, treatment progress and treatment plan reviewed with patient.  Supportive therapy provided to patient.  Group attendance encouraged.    Jayson Giles MD 06/27/24

## 2024-06-27 NOTE — PLAN OF CARE
Problem: Depression  Goal: Refrain from harming self  Description: Interventions:  - Monitor patient closely, per order   - Supervise medication ingestion, monitor effects and side effects   Outcome: Progressing     Problem: Anxiety  Goal: Anxiety is at manageable level  Description: Interventions:  - Assess and monitor patient's anxiety level.   - Monitor for signs and symptoms (heart palpitations, chest pain, shortness of breath, headaches, nausea, feeling jumpy, restlessness, irritable, apprehensive).   - Collaborate with interdisciplinary team and initiate plan and interventions as ordered.  - [place] patient to unit/surroundings  - Explain treatment plan  - Encourage participation in care  - Encourage verbalization of concerns/fears  - Identify coping mechanisms  - Assist in developing anxiety-reducing skills  - Administer/offer alternative therapies  - Limit or eliminate stimulants  Outcome: Progressing

## 2024-06-28 PROCEDURE — 99232 SBSQ HOSP IP/OBS MODERATE 35: CPT | Performed by: STUDENT IN AN ORGANIZED HEALTH CARE EDUCATION/TRAINING PROGRAM

## 2024-06-28 RX ADMIN — Medication 1000 UNITS: at 08:28

## 2024-06-28 RX ADMIN — SENNOSIDES AND DOCUSATE SODIUM 1 TABLET: 50; 8.6 TABLET ORAL at 17:13

## 2024-06-28 RX ADMIN — PANTOPRAZOLE SODIUM 40 MG: 40 TABLET, DELAYED RELEASE ORAL at 08:27

## 2024-06-28 RX ADMIN — LAMOTRIGINE 100 MG: 100 TABLET ORAL at 17:11

## 2024-06-28 RX ADMIN — FOLIC ACID 1 MG: 1 TABLET ORAL at 08:27

## 2024-06-28 RX ADMIN — POLYETHYLENE GLYCOL 3350 17 G: 17 POWDER, FOR SOLUTION ORAL at 08:29

## 2024-06-28 RX ADMIN — LORAZEPAM 0.5 MG: 0.5 TABLET ORAL at 08:29

## 2024-06-28 RX ADMIN — GUAIFENESIN 600 MG: 600 TABLET ORAL at 08:28

## 2024-06-28 RX ADMIN — CLOZAPINE 350 MG: 100 TABLET ORAL at 20:44

## 2024-06-28 RX ADMIN — FLUTICASONE PROPIONATE 1 SPRAY: 50 SPRAY, METERED NASAL at 08:30

## 2024-06-28 RX ADMIN — NYSTATIN 500000 UNITS: 100000 SUSPENSION ORAL at 17:12

## 2024-06-28 RX ADMIN — MELOXICAM 15 MG: 7.5 TABLET ORAL at 08:27

## 2024-06-28 RX ADMIN — LAMOTRIGINE 100 MG: 100 TABLET ORAL at 08:26

## 2024-06-28 RX ADMIN — NYSTATIN 500000 UNITS: 100000 SUSPENSION ORAL at 20:43

## 2024-06-28 RX ADMIN — RISPERIDONE 0.5 MG: 0.5 TABLET, FILM COATED ORAL at 08:28

## 2024-06-28 RX ADMIN — THIAMINE HCL TAB 100 MG 100 MG: 100 TAB at 08:27

## 2024-06-28 RX ADMIN — LORAZEPAM 0.5 MG: 0.5 TABLET ORAL at 17:11

## 2024-06-28 RX ADMIN — Medication 1 TABLET: at 08:27

## 2024-06-28 RX ADMIN — RISPERIDONE 0.5 MG: 0.5 TABLET, FILM COATED ORAL at 20:44

## 2024-06-28 RX ADMIN — ATORVASTATIN CALCIUM 10 MG: 10 TABLET, FILM COATED ORAL at 17:11

## 2024-06-28 RX ADMIN — FLUTICASONE FUROATE AND VILANTEROL TRIFENATATE 1 PUFF: 100; 25 POWDER RESPIRATORY (INHALATION) at 08:30

## 2024-06-28 RX ADMIN — CLOZAPINE 50 MG: 25 TABLET ORAL at 08:27

## 2024-06-28 RX ADMIN — GUAIFENESIN 600 MG: 600 TABLET ORAL at 20:44

## 2024-06-28 RX ADMIN — RISPERIDONE 0.5 MG: 0.5 TABLET, FILM COATED ORAL at 17:11

## 2024-06-28 RX ADMIN — NYSTATIN 500000 UNITS: 100000 SUSPENSION ORAL at 12:18

## 2024-06-28 RX ADMIN — NYSTATIN 500000 UNITS: 100000 SUSPENSION ORAL at 08:26

## 2024-06-28 RX ADMIN — NICOTINE 1 PATCH: 14 PATCH, EXTENDED RELEASE TRANSDERMAL at 08:28

## 2024-06-28 RX ADMIN — SENNOSIDES AND DOCUSATE SODIUM 1 TABLET: 50; 8.6 TABLET ORAL at 08:27

## 2024-06-28 NOTE — SOCIAL WORK
Cm received message from Americo with ACT team. CM returned call and left message requesting return call. [phone].

## 2024-06-28 NOTE — NURSING NOTE
Post prn assessment.She is resting quietly and appears to be sleeping,no overt s/s of SE.Will monitor.Ativan 0.5 mg effective.

## 2024-06-28 NOTE — NURSING NOTE
Patient observed sleeping during shift.  No acute behaviors noted.  No change in medical condition overnight.  Maintained on q 7 minute safety checks. Will continue to monitor.

## 2024-06-28 NOTE — NURSING NOTE
Presents with c/o anxiety RT her current situation,HA of 25:offered ans accepted 0.5 mg of ativan po with education on expected therapeutics and SE to report,will monitor.

## 2024-06-28 NOTE — PROGRESS NOTES
06/27/24 0971   Team Meeting   Meeting Type Daily Rounds   Team Members Present   Team Members Present Physician;Nurse;;Occupational Therapist   Physician Team Member Dr. Tisha MD; JARED Platt   Nursing Team Member Ann Phillips, YUDY   Care Management Team Member Marium Wren, MS, NCC, LPC   OT Team Member LANDON WongS   Patient/Family Present   Patient Present No   Patient's Family Present No   Incontinent of urine, denies depression and anxiety, delusional, paranoid, responding to internal stimuli, prn, d/c next week. Will return home and continue with act team.

## 2024-06-28 NOTE — NURSING NOTE
Pt is pleasant & socializes with other patients. Pt is cooperative & compliant with medications. Pt denies any depression or anxiety. Pt denies any hallucinations, suicidal or homicidal ideations. Q 7 min checks maintained to monitor pt's behavior & safety. Pt up ad adilene & gait is steady.

## 2024-06-28 NOTE — SOCIAL WORK
CM met with PT for PT check in. PT indicated that she is feeling better and denies si/hi/avh. PT expressed that she is hopeful to d/c early next week. Reassurance provided.

## 2024-06-28 NOTE — PLAN OF CARE
Problem: Alteration in Thoughts and Perception  Goal: Treatment Goal: Gain control of psychotic behaviors/thinking, reduce/eliminate presenting symptoms and demonstrate improved reality functioning upon discharge  Outcome: Progressing     Problem: Depression  Goal: Treatment Goal: Demonstrate behavioral control of depressive symptoms, verbalize feelings of improved mood/affect, and adopt new coping skills prior to discharge  Outcome: Progressing     Problem: Depression  Goal: Refrain from harming self  Description: Interventions:  - Monitor patient closely, per order   - Supervise medication ingestion, monitor effects and side effects   Outcome: Progressing     Problem: Depression  Goal: Refrain from self-neglect  Outcome: Progressing

## 2024-06-28 NOTE — PROGRESS NOTES
"Progress Note - Jo-Ann Lamb 54 y.o. female MRN: [id number]    Unit/Bed#: [facility] 205-02 Encounter: [id number]        Subjective:   Patient seen and examined at bedside after reviewing the chart and discussing the case with the caring staff.      Patient examined at bedside.  Patient denies any acute symptoms.     Physical Exam   Vitals: Blood pressure 134/84, pulse 89, temperature 97.5 °F (36.4 °C), temperature source Temporal, resp. rate 18, height 5' 5\" (1.651 m), weight 70 kg (154 lb 6.4 oz), last menstrual period 01/01/2020, SpO2 95%, not currently breastfeeding.,Body mass index is 25.69 kg/m².  Constitutional: Patient in no acute distress.  HEENT: PERR, EOMI, MMM.  Cardiovascular: Normal rate and regular rhythm.    Pulmonary/Chest: Effort normal and breath sounds normal.   Abdomen: Soft, + BS, NT.    Assessment/Plan:  Jo-Ann Lamb is a(n) 54 y.o. female with schizoaffective disorder.     Hyperlipidemia.  Continue atorvastatin 10 mg daily.  Asthma.  Patient is on Breo Ellipta daily (Advair nonform), albuterol inhaler as needed.   Allergic rhinitis.  Continue Flonase nasal spray daily.   GERD.  Patient on Protonix 40 mg daily.   Tobacco abuse.  NRT.   Alcohol abuse.  Patient started on thiamine, folic acid, MVI.   Chronic back pain.  Continue Mobic 15 mg daily, Robaxin as needed.  Vitamin D deficiency.  Continue vitamin D3 1000 units daily.  Constipation/hemorrhoids.  Continue senokot S twice daily and Miralax daily.  Apply Anusol-HC as needed.   Cough/congestion/sore throat.  Started on Mucinex twice daily.  Tessalon perles and chloraseptic spray as needed.  Thrush.  Patient started on nystatin swish QID x 10 days.  Discussed rinsing mouth thoroughly after using inhalers.     The patient was discussed with Dr. Steel and he is in agreement with the above note.  "

## 2024-06-28 NOTE — NURSING NOTE
Brightens upon approach:denies depression,anxiety,SI,HI,AH,VH although hints of anxiety noted as manifested by rapid speech.Jo-Ann is visible on the unit,social with peers,pleasant,cooperative,appetite is adequate,makes needs known,is compliant with medications,unit rules,is independent with ambulation and ADLs.WE discussed ways to prevent falls.Will continue to educate,monitor,and provide safe,therapeutic milieu.

## 2024-06-28 NOTE — PROGRESS NOTES
06/28/24 1000    Team Meeting   Meeting Type Daily Rounds   Team Members Present   Team Members Present Physician;Nurse;Occupational Therapist;   Physician Team Member Dr. Tatiana MD; JARED Platt   Nursing Team Member Shannon Phillips RN   Care Management Team Member Marium Wren, MS, NCC, LPC   OT Team Member LANDON WongS   Patient/Family Present   Patient Present No   Patient's Family Present No   Delusional, responding to internal stimuli, prns, yelling out, denies all, medication adjustment. Will return home when stable and follow up with act team.

## 2024-06-29 PROCEDURE — 99232 SBSQ HOSP IP/OBS MODERATE 35: CPT | Performed by: STUDENT IN AN ORGANIZED HEALTH CARE EDUCATION/TRAINING PROGRAM

## 2024-06-29 RX ORDER — BENZONATATE 100 MG/1
100 CAPSULE ORAL
Status: DISCONTINUED | OUTPATIENT
Start: 2024-06-29 | End: 2024-07-02 | Stop reason: HOSPADM

## 2024-06-29 RX ADMIN — FOLIC ACID 1 MG: 1 TABLET ORAL at 08:21

## 2024-06-29 RX ADMIN — PANTOPRAZOLE SODIUM 40 MG: 40 TABLET, DELAYED RELEASE ORAL at 08:21

## 2024-06-29 RX ADMIN — RISPERIDONE 0.5 MG: 0.5 TABLET, FILM COATED ORAL at 15:46

## 2024-06-29 RX ADMIN — MELOXICAM 15 MG: 7.5 TABLET ORAL at 08:22

## 2024-06-29 RX ADMIN — HYDROCORTISONE: 25 CREAM TOPICAL at 19:59

## 2024-06-29 RX ADMIN — NYSTATIN 500000 UNITS: 100000 SUSPENSION ORAL at 08:24

## 2024-06-29 RX ADMIN — BENZONATATE 100 MG: 100 CAPSULE ORAL at 21:12

## 2024-06-29 RX ADMIN — GUAIFENESIN 600 MG: 600 TABLET ORAL at 21:12

## 2024-06-29 RX ADMIN — CLOZAPINE 350 MG: 100 TABLET ORAL at 21:12

## 2024-06-29 RX ADMIN — NYSTATIN 500000 UNITS: 100000 SUSPENSION ORAL at 12:20

## 2024-06-29 RX ADMIN — LAMOTRIGINE 100 MG: 100 TABLET ORAL at 17:05

## 2024-06-29 RX ADMIN — FLUTICASONE PROPIONATE 1 SPRAY: 50 SPRAY, METERED NASAL at 08:25

## 2024-06-29 RX ADMIN — Medication 1000 UNITS: at 08:21

## 2024-06-29 RX ADMIN — THIAMINE HCL TAB 100 MG 100 MG: 100 TAB at 08:21

## 2024-06-29 RX ADMIN — LORAZEPAM 0.5 MG: 0.5 TABLET ORAL at 08:22

## 2024-06-29 RX ADMIN — GUAIFENESIN 600 MG: 600 TABLET ORAL at 08:22

## 2024-06-29 RX ADMIN — LAMOTRIGINE 100 MG: 100 TABLET ORAL at 08:21

## 2024-06-29 RX ADMIN — Medication 3 MG: at 21:12

## 2024-06-29 RX ADMIN — POLYETHYLENE GLYCOL 3350 17 G: 17 POWDER, FOR SOLUTION ORAL at 08:24

## 2024-06-29 RX ADMIN — CLOZAPINE 50 MG: 25 TABLET ORAL at 08:21

## 2024-06-29 RX ADMIN — RISPERIDONE 0.5 MG: 0.5 TABLET, FILM COATED ORAL at 08:22

## 2024-06-29 RX ADMIN — FLUTICASONE FUROATE AND VILANTEROL TRIFENATATE 1 PUFF: 100; 25 POWDER RESPIRATORY (INHALATION) at 08:24

## 2024-06-29 RX ADMIN — Medication 1 TABLET: at 08:21

## 2024-06-29 RX ADMIN — LORAZEPAM 0.5 MG: 0.5 TABLET ORAL at 17:07

## 2024-06-29 RX ADMIN — NICOTINE 1 PATCH: 14 PATCH, EXTENDED RELEASE TRANSDERMAL at 08:24

## 2024-06-29 RX ADMIN — NYSTATIN 500000 UNITS: 100000 SUSPENSION ORAL at 17:05

## 2024-06-29 RX ADMIN — SENNOSIDES AND DOCUSATE SODIUM 1 TABLET: 50; 8.6 TABLET ORAL at 17:05

## 2024-06-29 RX ADMIN — RISPERIDONE 0.5 MG: 0.5 TABLET, FILM COATED ORAL at 21:12

## 2024-06-29 RX ADMIN — SENNOSIDES AND DOCUSATE SODIUM 1 TABLET: 50; 8.6 TABLET ORAL at 08:22

## 2024-06-29 RX ADMIN — ATORVASTATIN CALCIUM 10 MG: 10 TABLET, FILM COATED ORAL at 15:47

## 2024-06-29 RX ADMIN — NYSTATIN 500000 UNITS: 100000 SUSPENSION ORAL at 21:12

## 2024-06-29 NOTE — NURSING NOTE
Pt up ad adilene & gait is steady. Pt c/o mild depression or anxiety. Pt denies any hallucinations, suicidal or homicidal ideations. Q 7 min checks maintained to monitor pt's behavior & safety. Pt is pleasant & socializes with other patients. Pt is cooperative & compliant with medications.

## 2024-06-29 NOTE — PROGRESS NOTES
Progress Note - Behavioral Health   Jo-Ann Lamb 54 y.o. female MRN: [id number]  Unit/Bed#: [facility] 205-02 Encounter: [id number]    Assessment & Plan   Principal Problem:    Schizoaffective disorder, bipolar type (HCC)  Active Problems:    Degenerative disc disease, lumbar    Hyperlipidemia    Post-traumatic stress disorder, chronic    Gastroesophageal reflux disease    Chronic obstructive pulmonary disease (HCC)    Recommended Treatment:   Patient requires combination of clozapine and another onset.  As clozapine level is approaching maximum at 400 mg/day and the patient was stable on combination of clozapine and risperidone outside the hospital under supervision of ACT team    All current active medications have been reviewed  Encourage group therapy, milieu therapy and occupational therapy  Behavioral Health checks every 7 minutes  Await ACT assessment  Medical management per SLIM.    Commitment Status: 201  ----------------------------------------      Subjective: Patient discussed in nursing report and overnight notes and charting reviewed.  Patient is calm and cooperative and pleasant upon approach.  Patient takes medications as prescribed and denies side effects.  Patient is visible on the unit and social with peers.  Denies SI, HI, AVH    Behavior over the last 24 hours:  improved  Sleep: normal  Appetite: normal  Medication side effects: No  ROS: no complaints and all other systems are negative    Mental Status Evaluation:  Appearance:  age appropriate, casually dressed, dressed appropriately   Behavior:  normal, pleasant, cooperative   Speech:  normal rate and volume   Mood:  euthymic   Affect:  appropriate   Thought Process:  organized, logical, coherent   Associations: intact associations   Thought Content:  normal, no overt delusions   Perceptual Disturbances: denies auditory hallucinations when asked, does not appear responding to internal stimuli   Risk Potential: Suicidal ideation - None at  present  Homicidal ideation - None at present  Potential for aggression - Not at present   Sensorium:  oriented to person, place, and time/date   Memory:  recent and remote memory grossly intact   Consciousness:  alert and awake   Attention/Concentration: attention span and concentration are age appropriate   Insight:  improving   Judgment: improving   Gait/Station: normal gait/station   Motor Activity: no abnormal movements     Medications: all current active meds have been reviewed, continue current psychiatric medications, and current meds:   Current Facility-Administered Medications   Medication Dose Route Frequency    acetaminophen (TYLENOL) tablet 650 mg  650 mg Oral Q4H PRN    acetaminophen (TYLENOL) tablet 650 mg  650 mg Oral Q4H PRN    acetaminophen (TYLENOL) tablet 975 mg  975 mg Oral Q6H PRN    albuterol (PROVENTIL HFA,VENTOLIN HFA) inhaler 2 puff  2 puff Inhalation Q4H PRN    aluminum-magnesium hydroxide-simethicone (MAALOX) oral suspension 30 mL  30 mL Oral Q4H PRN    atorvastatin (LIPITOR) tablet 10 mg  10 mg Oral Daily With Dinner    benzonatate (TESSALON PERLES) capsule 100 mg  100 mg Oral TID PRN    Cholecalciferol (VITAMIN D3) tablet 1,000 Units  1,000 Units Oral QAM    cloZAPine (CLOZARIL) tablet 350 mg  350 mg Oral HS    cloZAPine (CLOZARIL) tablet 50 mg  50 mg Oral Daily    fluticasone (FLONASE) 50 mcg/act nasal spray 1 spray  1 spray Nasal Daily    Fluticasone Furoate-Vilanterol 100-25 mcg/actuation 1 puff  1 puff Inhalation Daily    folic acid (FOLVITE) tablet 1 mg  1 mg Oral Daily    guaiFENesin (MUCINEX) 12 hr tablet 600 mg  600 mg Oral Q12H [facility]    haloperidol lactate (HALDOL) injection 5 mg  5 mg Intramuscular Q4H PRN Max 4/day    hydrocortisone (ANUSOL-HC) 2.5 % rectal cream   Topical 4x Daily PRN    hydrOXYzine HCL (ATARAX) tablet 50 mg  50 mg Oral Q6H PRN Max 4/day    hydrOXYzine HCL (ATARAX) tablet 75 mg  75 mg Oral Q6H PRN Max 4/day    lamoTRIgine (LaMICtal) tablet 100 mg  100 mg Oral  BID    LORazepam (ATIVAN) tablet 0.5 mg  0.5 mg Oral Q8H PRN    LORazepam (ATIVAN) tablet 0.5 mg  0.5 mg Oral BID    melatonin tablet 3 mg  3 mg Oral HS    meloxicam (MOBIC) tablet 15 mg  15 mg Oral Daily    methocarbamol (ROBAXIN) tablet 500 mg  500 mg Oral Q6H PRN    multivitamin-minerals (CENTRUM) tablet 1 tablet  1 tablet Oral Daily    nicotine (NICODERM CQ) 14 mg/24hr TD 24 hr patch 1 patch  1 patch Transdermal Daily    nicotine polacrilex (NICORETTE) gum 4 mg  4 mg Oral Q2H PRN    nystatin (MYCOSTATIN) oral suspension 500,000 Units  500,000 Units Swish & Swallow 4x Daily    pantoprazole (PROTONIX) EC tablet 40 mg  40 mg Oral Daily Before Breakfast    phenol (CHLORASEPTIC) 1.4 % mucosal liquid 1 spray  1 spray Mouth/Throat Q2H PRN    polyethylene glycol (MIRALAX) packet 17 g  17 g Oral Daily    propranolol (INDERAL) tablet 5 mg  5 mg Oral Q8H PRN    risperiDONE (RisperDAL) tablet 0.25 mg  0.25 mg Oral Q4H PRN Max 6/day    risperiDONE (RisperDAL) tablet 0.5 mg  0.5 mg Oral Q4H PRN Max 3/day    risperiDONE (RisperDAL) tablet 0.5 mg  0.5 mg Oral TID    risperiDONE (RisperDAL) tablet 1 mg  1 mg Oral Q2H PRN Max 3/day    senna-docusate sodium (SENOKOT S) 8.6-50 mg per tablet 1 tablet  1 tablet Oral BID    simethicone (MYLICON) chewable tablet 80 mg  80 mg Oral Q6H PRN    thiamine tablet 100 mg  100 mg Oral Daily    traZODone (DESYREL) tablet 50 mg  50 mg Oral Q6H PRN Max 3/day   .  Current Facility-Administered Medications   Medication Dose Route Frequency Provider Last Rate    acetaminophen  650 mg Oral Q4H PRN Taylor Castro MD      acetaminophen  650 mg Oral Q4H PRN Taylor Castro MD      acetaminophen  975 mg Oral Q6H PRN Taylor Castro MD      albuterol  2 puff Inhalation Q4H PRN Payton Conrad PA-C      aluminum-magnesium hydroxide-simethicone  30 mL Oral Q4H PRN Taylor Castro MD      atorvastatin  10 mg Oral Daily With Dinner Payton Conrad PA-C      benzonatate  100 mg Oral TID PRN  Payton Elizabethnall, PA-C      Cholecalciferol  1,000 Units Oral QAM Payton GRANADOS Yuniorl, PA-C      cloZAPine  350 mg Oral HS Jayson Giles MD      cloZAPine  50 mg Oral Daily Jayson Giles MD      fluticasone  1 spray Nasal Daily Payton Elizabethnall, PA-C      Fluticasone Furoate-Vilanterol  1 puff Inhalation Daily Payton Elizabethnall, PA-C      folic acid  1 mg Oral Daily Payton L Claranall, PA-C      guaiFENesin  600 mg Oral Q12H [facility] Payton GRANADOS Claranall, PA-C      haloperidol lactate  5 mg Intramuscular Q4H PRN Max 4/day Taylor Castro MD      hydrocortisone   Topical 4x Daily PRN Payton Mojical, PA-C      hydrOXYzine HCL  50 mg Oral Q6H PRN Max 4/day Jayson Giles MD      hydrOXYzine HCL  75 mg Oral Q6H PRN Max 4/day Jayson Giles MD      lamoTRIgine  100 mg Oral BID Jayson Giles MD      LORazepam  0.5 mg Oral Q8H PRN Jayson Giles MD      LORazepam  0.5 mg Oral BID Jyason Giles MD      melatonin  3 mg Oral HS Taylro Castro MD      meloxicam  15 mg Oral Daily Payton Elizabethnall, PA-C      methocarbamol  500 mg Oral Q6H PRN Payton Elizabethnall, PA-C      multivitamin-minerals  1 tablet Oral Daily Payton GRANADOS Yuniorl, PA-C      nicotine  1 patch Transdermal Daily JARED Carson      nicotine polacrilex  4 mg Oral Q2H PRN Taylor Castro MD      nystatin  500,000 Units Swish & Swallow 4x Daily Payton L Dagnall, PA-C      pantoprazole  40 mg Oral Daily Before Breakfast Payton L Claranall, PA-C      phenol  1 spray Mouth/Throat Q2H PRN Payton L Dagnall, PA-C      polyethylene glycol  17 g Oral Daily Payton L Dagnall, PA-C      propranolol  5 mg Oral Q8H PRN Taylor Castro MD      risperiDONE  0.25 mg Oral Q4H PRN Max 6/day Taylor Castro MD      risperiDONE  0.5 mg Oral Q4H PRN Max 3/day Taylor Castro MD      risperiDONE  0.5 mg Oral TID Jayson Giles MD      risperiDONE  1 mg Oral Q2H PRN Max 3/day Taylor Castro MD      senna-docusate sodium  1 tablet Oral BID MARTINE Chandra   80 mg Oral Q6H PRN Payton Conrad PA-C      thiamine  100 mg Oral Daily Payton Conrad PA-C      traZODone  50 mg Oral Q6H PRN Max 3/day Taylor Castro MD         Labs: I have personally reviewed all pertinent laboratory/tests results  Most Recent Labs:     Results from the past 24 hours: No results found for this or any previous visit (from the past 24 hour(s)).  Most Recent Labs: @RESUFAST(WBC,RBC,HGB,HCT,PLT,RDW,NEUTROABS,TOTANEUTABS,SODIUM,K,CL,CO2,BUN,CREATININE,GLUC,CALCIUM,AST,ALT,ALKPHOS,TP,ALB,TBILI,CHOLESTEROL,HDL,TRIG,LDLCALC,NONHDLC,VALPROICTOT,CARBAMAZEPIN,LITHIUM,AMMONIA,[id number],FREET4,T3FREE,EXTPREGUR,PREGUR,PREGSERUM,HCG,HCGQUANT,RPR,HGBA1C,EAG)@  Last Laboratory Results with Depakote and/or Tegretol levels:   Lab Results   Component Value Date    WBC 9.16 06/27/2024    RBC 4.65 06/27/2024    HGB 14.0 06/27/2024    HCT 43.3 06/27/2024     06/27/2024    RDW 14.1 06/27/2024    NEUTROABS 6.28 06/27/2024    SODIUM 139 06/27/2024    K 4.1 06/27/2024     06/27/2024    CO2 27 06/27/2024    BUN 9 06/27/2024    CREATININE 0.62 06/27/2024    GLUC 145 (H) 06/27/2024    CALCIUM 9.6 06/27/2024    AST 15 06/27/2024    ALT 17 06/27/2024    ALKPHOS 81 06/27/2024    TP 7.1 06/27/2024    ALB 4.2 06/27/2024    TBILI 0.29 06/27/2024     Last Laboratory Results with Lithium level:   Lab Results   Component Value Date    SODIUM 139 06/27/2024    K 4.1 06/27/2024     06/27/2024    CO2 27 06/27/2024    BUN 9 06/27/2024    CREATININE 0.62 06/27/2024    GLUC 145 (H) 06/27/2024    CALCIUM 9.6 06/27/2024     Labs in last 72 hours: @LABRCNT(WBC,RBC,HGB,HCT,PLT,RDW,TOTANEUTABS,NEUTROABS,SODIUM,K,CL,CO2,BUN,CREATININE,GLUC,CALCIUM,AST,ALT,ALKPHOS,TP,ALB,TBILI,CHOLESTEROL,HDL,TRIG,LDLCALC,VALPROICTOT,CARBAMAZEPIN,LITHIUM,AMMONIA,[id number],FREET4,T3FREE,PREGTESTUR,PREGSERUM,HCG,HCGQUANT,RPR)@  Admission Labs:   Admission on 06/26/2024   Component Date Value    Sodium 06/27/2024 139      Potassium 06/27/2024 4.1     Chloride 06/27/2024 103     CO2 06/27/2024 27     ANION GAP 06/27/2024 9     BUN 06/27/2024 9     Creatinine 06/27/2024 0.62     Glucose 06/27/2024 145 (H)     Calcium 06/27/2024 9.6     AST 06/27/2024 15     ALT 06/27/2024 17     Alkaline Phosphatase 06/27/2024 81     Total Protein 06/27/2024 7.1     Albumin 06/27/2024 4.2     Total Bilirubin 06/27/2024 0.29     eGFR 06/27/2024 103     WBC 06/27/2024 9.16     RBC 06/27/2024 4.65     Hemoglobin 06/27/2024 14.0     Hematocrit 06/27/2024 43.3     MCV 06/27/2024 93     MCH 06/27/2024 30.1     MCHC 06/27/2024 32.3     RDW 06/27/2024 14.1     MPV 06/27/2024 9.5     Platelets 06/27/2024 310     nRBC 06/27/2024 0     Segmented % 06/27/2024 69     Immature Grans % 06/27/2024 1     Lymphocytes % 06/27/2024 19     Monocytes % 06/27/2024 6     Eosinophils Relative 06/27/2024 5     Basophils Relative 06/27/2024 0     Absolute Neutrophils 06/27/2024 6.28     Absolute Immature Grans 06/27/2024 0.05     Absolute Lymphocytes 06/27/2024 1.77     Absolute Monocytes 06/27/2024 0.57     Eosinophils Absolute 06/27/2024 0.47     Basophils Absolute 06/27/2024 0.02     Vitamin B-12 06/27/2024 490     Folate 06/27/2024 >22.3     Vit D, 25-Hydroxy 06/27/2024 39.5     Cholesterol 06/27/2024 188     Triglycerides 06/27/2024 216 (H)     HDL, Direct 06/27/2024 62     LDL Calculated 06/27/2024 83     Non-HDL-Chol (CHOL-HDL) 06/27/2024 126     Syphilis Total Antibody 06/27/2024 Non-reactive     Ventricular Rate 06/27/2024 79     Atrial Rate 06/27/2024 79     WA Interval 06/27/2024 158     QRSD Interval 06/27/2024 72     QT Interval 06/27/2024 406     QTC Interval 06/27/2024 465     P Axis 06/27/2024 45     QRS Axis 06/27/2024 9     T Wave [place] 06/27/2024 51        Progress Toward Goals: improving    Risks / Benefits of Treatment:    Risks, benefits, and possible side effects of medications explained to patient and patient verbalizes understanding and agreement for  treatment.    Counseling / Coordination of Care:    Total floor / unit time spent today 15 minutes. Greater than 50% of total time was spent with the patient and / or family counseling and / or coordination of care.     A description of counseling / coordination of care:  Patient's progress discussed with staff in treatment team meeting.  Treatment plan, treatment progress and medication changes were reviewed with nursing staff, pharmacy service, and case management in interdisciplinary treatment team meeting.  Medications, treatment progress and treatment plan reviewed with patient.  Recent stressors including limited support, social difficulties, ongoing anxiety, and chronic mental illness discussed with patient.  Educated on importance of medication and treatment compliance.  Reassurance and supportive therapy provided.  Encouraged participation in milieu and group therapy on the unit.      Davey Simpson MD 06/28/24

## 2024-06-29 NOTE — PROGRESS NOTES
"Progress Note - Jo-Ann Lamb 54 y.o. female MRN: [id number]    Unit/Bed#: [facility] 205-02 Encounter: [id number]        Subjective:   Patient seen and examined at bedside after reviewing the chart and discussing the case with the caring staff.      Patient examined at bedside.  Patient reports cough and congestion. States it is worse at night.     Physical Exam   Vitals: Blood pressure 124/69, pulse 88, temperature 98 °F (36.7 °C), temperature source Temporal, resp. rate 18, height 5' 5\" (1.651 m), weight 70 kg (154 lb 6.4 oz), last menstrual period 01/01/2020, SpO2 92%, not currently breastfeeding.,Body mass index is 25.69 kg/m².  Constitutional: Patient in no acute distress.  HEENT: PERR, EOMI, MMM.  Cardiovascular: Normal rate and regular rhythm.    Pulmonary/Chest: Effort normal and breath sounds normal.   Abdomen: Soft, + BS, NT.    Assessment/Plan:  Jo-Ann Lamb is a(n) 54 y.o. female with schizoaffective disorder.     Hyperlipidemia.  Continue atorvastatin 10 mg daily.  Asthma.  Patient is on Breo Ellipta daily (Advair nonform), albuterol inhaler as needed.   Allergic rhinitis.  Continue Flonase nasal spray daily.   GERD.  Patient on Protonix 40 mg daily.   Tobacco abuse.  NRT.   Alcohol abuse.  Patient started on thiamine, folic acid, MVI.   Chronic back pain.  Continue Mobic 15 mg daily, Robaxin as needed.  Vitamin D deficiency.  Continue vitamin D3 1000 units daily.  Constipation/hemorrhoids.  Continue senokot S twice daily and Miralax daily.  Apply Anusol-HC as needed.   Cough/congestion/sore throat.  Started on Mucinex twice daily.  Tessalon perles daily at bedtime. Chloraseptic spray as needed.  Thrush.  Patient started on nystatin swish QID x 10 days.  Discussed rinsing mouth thoroughly after using inhalers.     The patient was discussed with Dr. Steel and he is in agreement with the above note.  "

## 2024-06-29 NOTE — PROGRESS NOTES
"Progress Note - Behavioral Health   Jo-Ann Lamb 54 y.o. female MRN: [id number]  Unit/Bed#: [facility] 205-02 Encounter: [id number]    Subjective:    Per nursing, patient brightens on approach, visible on unit, social with peers, pleasant and cooperative.  Denying any hallucinations, denying SI/HI    Per patient, patient denies any acute concerns.  She reports that she has been on this dosage of Clozapine for months but was wondering if a level would be checked.  She describes her mood as \"good, happy,\" feeling much better being off Effexor.  She reports appetite and energy are good.  She reports sleeping fairly well.  She denies any current auditory or visual hallucinations.  She reports staff is treating her well.  She notes mild dizziness, wonders if she has a sinus infection but notes feeling this way chronically.    Behavior over the last 24 hours:  improved  Medication side effects: No  ROS: no complaints    Objective:    Temp:  [97 °F (36.1 °C)-98.2 °F (36.8 °C)] 97 °F (36.1 °C)  HR:  [] 83  Resp:  [18-20] 18  BP: (124-140)/(69-92) 140/92    Mental Status Evaluation:  Appearance:  sitting comfortably in chair, dressed in casual clothing, adequate hygiene and grooming, cooperative with interview   Behavior:  No tics, tremors, or behaviors observed   Speech:  Normal rate, rhythm, and volume   Mood:  \"Good, happy\"   Affect:  Appears generally euthymic, stable, mood-congruent   Thought Process:  Linear and goal directed   Associations intact associations   Thought Content:  No passive or active suicidal or homicidal ideation, intent, or plan.   Perceptual Disturbances: Denies any auditory or visual hallucinations   Sensorium:  Oriented to person, place, time, and situation   Memory:  recent and remote memory grossly intact   Consciousness:  alert and awake   Attention: attention span and concentration were age appropriate   Insight:  fair   Judgment: fair   Gait/Station: normal gait/station   Motor Activity: no " abnormal movements       Labs: I have personally reviewed all pertinent laboratory/tests results.  Labs in last 72 hours:   Recent Labs     06/27/24  0817   WBC 9.16   RBC 4.65   HGB 14.0   HCT 43.3      RDW 14.1   NEUTROABS 6.28   SODIUM 139   K 4.1      CO2 27   BUN 9   CREATININE 0.62   GLUC 145*   CALCIUM 9.6   AST 15   ALT 17   ALKPHOS 81   TP 7.1   ALB 4.2   TBILI 0.29   CHOLESTEROL 188   HDL 62   TRIG 216*   LDLCALC 83       Progress Toward Goals: Progressing    Recommended Treatment: Continue with group therapy, milieu therapy and occupational therapy.      Risks, benefits and possible side effects of Medications:   Risks, benefits, and possible side effects of medications explained to patient and patient verbalizes understanding.      Medications: all current active meds have been reviewed.  Current Facility-Administered Medications   Medication Dose Route Frequency Provider Last Rate    acetaminophen  650 mg Oral Q4H PRN Taylor Castro MD      acetaminophen  650 mg Oral Q4H PRN Taylor Castro MD      acetaminophen  975 mg Oral Q6H PRN Taylor Castro MD      albuterol  2 puff Inhalation Q4H PRN Payton Conrad PA-C      aluminum-magnesium hydroxide-simethicone  30 mL Oral Q4H PRN Taylor Castro MD      atorvastatin  10 mg Oral Daily With Dinner Payton Conrad PA-C      benzonatate  100 mg Oral HS Zandra Barclay PA-C      Cholecalciferol  1,000 Units Oral QAM Payton Conrad PA-C      cloZAPine  350 mg Oral HS Jayson Giles MD      cloZAPine  50 mg Oral Daily Jayson Giles MD      fluticasone  1 spray Nasal Daily FAISAL Chandra-C      Fluticasone Furoate-Vilanterol  1 puff Inhalation Daily FAISAL Chandra-C      folic acid  1 mg Oral Daily Payton Conrad PA-C      guaiFENesin  600 mg Oral Q12H [facility] Payton Conrad PA-C      haloperidol lactate  5 mg Intramuscular Q4H PRN Max 4/day Taylor Castro MD      hydrocortisone   Topical 4x Daily PRN  Payton L Dagnall, PA-C      hydrOXYzine HCL  50 mg Oral Q6H PRN Max 4/day Jayson Giles MD      hydrOXYzine HCL  75 mg Oral Q6H PRN Max 4/day Jayson Giles MD      lamoTRIgine  100 mg Oral BID Jayson Giles MD      LORazepam  0.5 mg Oral Q8H PRN Jayson Giles MD      LORazepam  0.5 mg Oral BID Jayson Giles MD      melatonin  3 mg Oral HS Taylor Castro MD      meloxicam  15 mg Oral Daily Payton L Dagnall, PA-C      methocarbamol  500 mg Oral Q6H PRN Payton L Dagnall, PA-C      multivitamin-minerals  1 tablet Oral Daily Payton L Dagnall, PA-C      nicotine  1 patch Transdermal Daily JARED Carson      nicotine polacrilex  4 mg Oral Q2H PRN Taylor Castro MD      nystatin  500,000 Units Swish & Swallow 4x Daily Payton L Dagnall, PA-C      pantoprazole  40 mg Oral Daily Before Breakfast Payton L Dagnall, PA-C      phenol  1 spray Mouth/Throat Q2H PRN Payton L Dagnall, PA-C      polyethylene glycol  17 g Oral Daily Payton L Dagnall, PA-C      propranolol  5 mg Oral Q8H PRN Taylor Castro MD      risperiDONE  0.25 mg Oral Q4H PRN Max 6/day Taylor Castro MD      risperiDONE  0.5 mg Oral Q4H PRN Max 3/day Taylor Castro MD      risperiDONE  0.5 mg Oral TID Jayson Giles MD      risperiDONE  1 mg Oral Q2H PRN Max 3/day Taylor Castro MD      senna-docusate sodium  1 tablet Oral BID Payton L Dagnall, PA-C      simethicone  80 mg Oral Q6H PRN Payton L Dagnall, PA-C      thiamine  100 mg Oral Daily Payton L Dagnall, PA-C      traZODone  50 mg Oral Q6H PRN Max 3/day Taylor Castro MD           Assessment & Plan   Principal Problem:    Schizoaffective disorder, bipolar type (HCC)  Active Problems:    Degenerative disc disease, lumbar    Hyperlipidemia    Post-traumatic stress disorder, chronic    Gastroesophageal reflux disease    Chronic obstructive pulmonary disease (HCC)    53 y/o Female with schizoaffective disorder- continues to make improvements, relatively stable mood symptoms, no  overt psychotic symptoms, tolerating medication well, mild dizziness reported at times    Plan:  -Continue current med regimen.

## 2024-06-30 PROCEDURE — 99232 SBSQ HOSP IP/OBS MODERATE 35: CPT | Performed by: STUDENT IN AN ORGANIZED HEALTH CARE EDUCATION/TRAINING PROGRAM

## 2024-06-30 RX ADMIN — CLOZAPINE 50 MG: 25 TABLET ORAL at 08:16

## 2024-06-30 RX ADMIN — FLUTICASONE FUROATE AND VILANTEROL TRIFENATATE 1 PUFF: 100; 25 POWDER RESPIRATORY (INHALATION) at 08:21

## 2024-06-30 RX ADMIN — NYSTATIN 500000 UNITS: 100000 SUSPENSION ORAL at 21:46

## 2024-06-30 RX ADMIN — Medication 3 MG: at 21:19

## 2024-06-30 RX ADMIN — LAMOTRIGINE 100 MG: 100 TABLET ORAL at 17:23

## 2024-06-30 RX ADMIN — GUAIFENESIN 600 MG: 600 TABLET ORAL at 21:20

## 2024-06-30 RX ADMIN — PANTOPRAZOLE SODIUM 40 MG: 40 TABLET, DELAYED RELEASE ORAL at 05:42

## 2024-06-30 RX ADMIN — SENNOSIDES AND DOCUSATE SODIUM 1 TABLET: 50; 8.6 TABLET ORAL at 17:23

## 2024-06-30 RX ADMIN — LORAZEPAM 0.5 MG: 0.5 TABLET ORAL at 08:17

## 2024-06-30 RX ADMIN — Medication 1 TABLET: at 08:16

## 2024-06-30 RX ADMIN — FLUTICASONE PROPIONATE 1 SPRAY: 50 SPRAY, METERED NASAL at 08:21

## 2024-06-30 RX ADMIN — RISPERIDONE 0.5 MG: 0.5 TABLET, FILM COATED ORAL at 16:29

## 2024-06-30 RX ADMIN — RISPERIDONE 0.5 MG: 0.5 TABLET, FILM COATED ORAL at 08:16

## 2024-06-30 RX ADMIN — NICOTINE 1 PATCH: 14 PATCH, EXTENDED RELEASE TRANSDERMAL at 08:15

## 2024-06-30 RX ADMIN — HYDROXYZINE HYDROCHLORIDE 50 MG: 50 TABLET, FILM COATED ORAL at 21:46

## 2024-06-30 RX ADMIN — ATORVASTATIN CALCIUM 10 MG: 10 TABLET, FILM COATED ORAL at 16:29

## 2024-06-30 RX ADMIN — CLOZAPINE 350 MG: 100 TABLET ORAL at 21:19

## 2024-06-30 RX ADMIN — FOLIC ACID 1 MG: 1 TABLET ORAL at 08:17

## 2024-06-30 RX ADMIN — NYSTATIN 500000 UNITS: 100000 SUSPENSION ORAL at 17:23

## 2024-06-30 RX ADMIN — Medication 1000 UNITS: at 08:16

## 2024-06-30 RX ADMIN — LORAZEPAM 0.5 MG: 0.5 TABLET ORAL at 17:23

## 2024-06-30 RX ADMIN — LAMOTRIGINE 100 MG: 100 TABLET ORAL at 08:17

## 2024-06-30 RX ADMIN — POLYETHYLENE GLYCOL 3350 17 G: 17 POWDER, FOR SOLUTION ORAL at 08:16

## 2024-06-30 RX ADMIN — NYSTATIN 500000 UNITS: 100000 SUSPENSION ORAL at 12:10

## 2024-06-30 RX ADMIN — GUAIFENESIN 600 MG: 600 TABLET ORAL at 08:17

## 2024-06-30 RX ADMIN — MELOXICAM 15 MG: 7.5 TABLET ORAL at 08:17

## 2024-06-30 RX ADMIN — NYSTATIN 500000 UNITS: 100000 SUSPENSION ORAL at 08:16

## 2024-06-30 RX ADMIN — SENNOSIDES AND DOCUSATE SODIUM 1 TABLET: 50; 8.6 TABLET ORAL at 08:16

## 2024-06-30 RX ADMIN — THIAMINE HCL TAB 100 MG 100 MG: 100 TAB at 08:17

## 2024-06-30 RX ADMIN — RISPERIDONE 0.5 MG: 0.5 TABLET, FILM COATED ORAL at 21:20

## 2024-06-30 RX ADMIN — BENZONATATE 100 MG: 100 CAPSULE ORAL at 21:19

## 2024-06-30 NOTE — PROGRESS NOTES
"Progress Note - Jo-Ann Lamb 54 y.o. female MRN: [id number]    Unit/Bed#: [facility] 205-02 Encounter: [id number]        Subjective:   Patient seen and examined at bedside after reviewing the chart and discussing the case with the caring staff.      Patient examined at bedside.  Patient has no acute symptoms.    Physical Exam   Vitals: Blood pressure 118/78, pulse 92, temperature (!) 97.4 °F (36.3 °C), temperature source Temporal, resp. rate 18, height 5' 5\" (1.651 m), weight 70 kg (154 lb 6.4 oz), last menstrual period 01/01/2020, SpO2 96%, not currently breastfeeding.,Body mass index is 25.69 kg/m².  Constitutional: Patient in no acute distress.  HEENT: PERR, EOMI, MMM.  Cardiovascular: Normal rate and regular rhythm.    Pulmonary/Chest: Effort normal and breath sounds normal.   Abdomen: Soft, + BS, NT.    Assessment/Plan:  Jo-Ann Lamb is a(n) 54 y.o. female with schizoaffective disorder.     Hyperlipidemia.  Continue atorvastatin 10 mg daily.  Asthma.  Patient is on Breo Ellipta daily (Advair nonform), albuterol inhaler as needed.   Allergic rhinitis.  Continue Flonase nasal spray daily.   GERD.  Patient on Protonix 40 mg daily.   Tobacco abuse.  NRT.   Alcohol abuse.  Patient started on thiamine, folic acid, MVI.   Chronic back pain.  Continue Mobic 15 mg daily, Robaxin as needed.  Vitamin D deficiency.  Continue vitamin D3 1000 units daily.  Constipation/hemorrhoids.  Continue senokot S twice daily and Miralax daily.  Apply Anusol-HC as needed.   Cough/congestion/sore throat.  Started on Mucinex twice daily.  Tessalon perles daily at bedtime. Chloraseptic spray as needed.  Thrush.  Patient started on nystatin swish QID x 10 days.  Discussed rinsing mouth thoroughly after using inhalers.     The patient was discussed with Dr. Steel and he is in agreement with the above note.  "

## 2024-06-30 NOTE — PLAN OF CARE
Problem: Depression  Goal: Refrain from harming self  Description: Interventions:  - Monitor patient closely, per order   - Supervise medication ingestion, monitor effects and side effects   Outcome: Progressing     Problem: Anxiety  Goal: Anxiety is at manageable level  Description: Interventions:  - Assess and monitor patient's anxiety level.   - Monitor for signs and symptoms (heart palpitations, chest pain, shortness of breath, headaches, nausea, feeling jumpy, restlessness, irritable, apprehensive).   - Collaborate with interdisciplinary team and initiate plan and interventions as ordered.  - [place] patient to unit/surroundings  - Explain treatment plan  - Encourage participation in care  - Encourage verbalization of concerns/fears  - Identify coping mechanisms  - Assist in developing anxiety-reducing skills  - Administer/offer alternative therapies  - Limit or eliminate stimulants  Outcome: Progressing     Problem: Ineffective Coping  Goal: Participates in unit activities  Description: Interventions:  - Provide therapeutic environment   - Provide required programming   - Redirect inappropriate behaviors   Outcome: Progressing

## 2024-06-30 NOTE — PROGRESS NOTES
"Progress Note - Behavioral Health   Jo-Ann Lamb 54 y.o. female MRN: [id number]  Unit/Bed#: [facility] 205-02 Encounter: [id number]    Subjective:    Per nursing, patient is visible on unit and social with peers.  She has some depression and anxiety, denying SI/HI.      Per patient, patient denies any acute problems or concerns.  She reports her mood is \"okay,\" denying overwhelming feelings of sadness or depression, denying anger or irritability.  She reports some trouble falling asleep.  She reports appetite has been okay.  She does report some feelings of dizziness at times, tries to stay hydrated.  She denies any suicidal or homicidal ideation, intent, or plan.  She denies any perceptual disturbances.    Behavior over the last 24 hours:  improved  Medication side effects: No  ROS:  dizziness    Objective:    Temp:  [96.8 °F (36 °C)-97.4 °F (36.3 °C)] 97.4 °F (36.3 °C)  HR:  [83-92] 92  Resp:  [18] 18  BP: (118-140)/(78-92) 118/78    Mental Status Evaluation:  Appearance:  sitting comfortably in chair, dressed in casual clothing, adequate hygiene and grooming   Behavior:  No tics, tremors, or behaviors observed   Speech:  Normal rate, rhythm, and volume   Mood:  \"okay\"   Affect:  Appears generally euthymic, stable, mood-congruent   Thought Process:  Linear and goal directed   Associations intact associations   Thought Content:  No passive or active suicidal or homicidal ideation, intent, or plan.   Perceptual Disturbances: Denies any auditory or visual hallucinations   Sensorium:  Oriented to person, place, time, and situation   Memory:  recent and remote memory grossly intact   Consciousness:  alert and awake   Attention: attention span and concentration were age appropriate   Insight:  fair   Judgment: fair   Gait/Station: normal gait/station   Motor Activity: no abnormal movements     Progress Toward Goals: Progressing    Recommended Treatment: Continue with group therapy, milieu therapy and occupational therapy.  "     Risks, benefits and possible side effects of Medications:   Risks, benefits, and possible side effects of medications explained to patient and patient verbalizes understanding.      Medications: all current active meds have been reviewed.  Current Facility-Administered Medications   Medication Dose Route Frequency Provider Last Rate    acetaminophen  650 mg Oral Q4H PRN Taylor Castro MD      acetaminophen  650 mg Oral Q4H PRN Taylor Castro MD      acetaminophen  975 mg Oral Q6H PRN Taylor Castro MD      albuterol  2 puff Inhalation Q4H PRN Payton L Dagnall, PA-C      aluminum-magnesium hydroxide-simethicone  30 mL Oral Q4H PRN Taylor Castro MD      atorvastatin  10 mg Oral Daily With Dinner Payton L Yuniorl, PA-C      benzonatate  100 mg Oral HS RAMIN WillisC      Cholecalciferol  1,000 Units Oral QAM Payton L Claranall, PA-C      cloZAPine  350 mg Oral HS Jayson Giles MD      cloZAPine  50 mg Oral Daily Jayson Giles MD      fluticasone  1 spray Nasal Daily Payton L Dagnall, PA-C      Fluticasone Furoate-Vilanterol  1 puff Inhalation Daily Payton L Dagnall, PA-C      folic acid  1 mg Oral Daily Payton L Dagnall, PA-C      guaiFENesin  600 mg Oral Q12H KENNY Payton L Claranall, PA-C      haloperidol lactate  5 mg Intramuscular Q4H PRN Max 4/day Taylor Castro MD      hydrocortisone   Topical 4x Daily PRN Payton L YuniorlMARTINE      hydrOXYzine HCL  50 mg Oral Q6H PRN Max 4/day Jayson Giles MD      hydrOXYzine HCL  75 mg Oral Q6H PRN Max 4/day Jayson Giles MD      lamoTRIgine  100 mg Oral BID Jayson Giles MD      LORazepam  0.5 mg Oral Q8H PRN Jayson Giles MD      LORazepam  0.5 mg Oral BID Jayson Giles MD      melatonin  3 mg Oral HS Taylor Castro MD      meloxicam  15 mg Oral Daily Payton L Dagnall, MARTINE      methocarbamol  500 mg Oral Q6H PRN Payton L Dagnall, PA-C      multivitamin-minerals  1 tablet Oral Daily Payton L Dagnall, PA-C      nicotine  1 patch Transdermal  Daily JARED Carson      nicotine polacrilex  4 mg Oral Q2H PRN Taylor Castro MD      nystatin  500,000 Units Swish & Swallow 4x Daily Payton L Dagnall, PA-C      pantoprazole  40 mg Oral Daily Before Breakfast Payton L Dagnall, PA-C      phenol  1 spray Mouth/Throat Q2H PRN Payton L Dagnall, PA-C      polyethylene glycol  17 g Oral Daily Payton L Dagnall, PA-C      propranolol  5 mg Oral Q8H PRN Taylor Castro MD      risperiDONE  0.25 mg Oral Q4H PRN Max 6/day Taylor Castro MD      risperiDONE  0.5 mg Oral Q4H PRN Max 3/day Taylor Castro MD      risperiDONE  0.5 mg Oral TID Jayson Giles MD      risperiDONE  1 mg Oral Q2H PRN Max 3/day Taylor Castro MD      senna-docusate sodium  1 tablet Oral BID Payton L Dagnall, PA-C      simethicone  80 mg Oral Q6H PRN Payton L Dagnall, PA-C      thiamine  100 mg Oral Daily Payton L Dagnall, PA-C      traZODone  50 mg Oral Q6H PRN Max 3/day Taylor Castro MD         Assessment & Plan   Principal Problem:    Schizoaffective disorder, bipolar type (HCC)  Active Problems:    Degenerative disc disease, lumbar    Hyperlipidemia    Post-traumatic stress disorder, chronic    Gastroesophageal reflux disease    Chronic obstructive pulmonary disease (HCC)    53 y/o Female with schizoaffective disorder, PTSD- continues to have improvements in mood stability, no overt psychosis, tolerating medications well.    Plan:  -Continue current med regimen.

## 2024-06-30 NOTE — NURSING NOTE
BLEPS assessment completed & see Head to Toe assessment. Lungs clear upon auscultation & no peripheral edema noted. Pt up ad adilene & gait is steady. Pt c/o mild depression & mild anxiety. Pt denies any hallucinations, suicidal or homicidal ideations. Q 7 min checks maintained to monitor pt's behavior & safety. Pt is pleasant & socializes with other patients. Pt is cooperative & compliant with medications. Occasional non-productive cough noted.

## 2024-06-30 NOTE — NURSING NOTE
Visible on the unit and social with peers. Endorsed mild to moderate anxiety and depression. Denied SI, HI, or AVH. Affect was appropriate to circumstance. Eye contact was good. Speech pattern was normal and relaxed. Appearance and hygiene was unremarkable. Made no c/o pain or discomfort. 7 minute safety checks continued.

## 2024-07-01 DIAGNOSIS — F25.0 SCHIZOAFFECTIVE DISORDER, BIPOLAR TYPE (HCC): Chronic | ICD-10-CM

## 2024-07-01 PROCEDURE — 99232 SBSQ HOSP IP/OBS MODERATE 35: CPT | Performed by: PSYCHIATRY & NEUROLOGY

## 2024-07-01 RX ORDER — FLUTICASONE PROPIONATE AND SALMETEROL XINAFOATE 115; 21 UG/1; UG/1
2 AEROSOL, METERED RESPIRATORY (INHALATION) 2 TIMES DAILY
Qty: 12 G | Refills: 0 | Status: SHIPPED | OUTPATIENT
Start: 2024-07-01

## 2024-07-01 RX ORDER — METHOCARBAMOL 500 MG/1
500 TABLET, FILM COATED ORAL 2 TIMES DAILY
Qty: 30 TABLET | Refills: 0 | Status: SHIPPED | OUTPATIENT
Start: 2024-07-01

## 2024-07-01 RX ORDER — SIMETHICONE 80 MG
80 TABLET,CHEWABLE ORAL
Qty: 30 TABLET | Refills: 0 | Status: SHIPPED | OUTPATIENT
Start: 2024-07-01

## 2024-07-01 RX ORDER — RISPERIDONE 0.5 MG/1
0.5 TABLET ORAL 3 TIMES DAILY
Qty: 90 TABLET | Refills: 0 | Status: SHIPPED | OUTPATIENT
Start: 2024-07-01

## 2024-07-01 RX ORDER — LORAZEPAM 0.5 MG/1
0.5 TABLET ORAL 2 TIMES DAILY
Qty: 30 TABLET | Refills: 0 | Status: SHIPPED | OUTPATIENT
Start: 2024-07-01 | End: 2024-07-16

## 2024-07-01 RX ORDER — ATORVASTATIN CALCIUM 10 MG/1
10 TABLET, FILM COATED ORAL DAILY
Qty: 90 TABLET | Refills: 0 | Status: SHIPPED | OUTPATIENT
Start: 2024-07-01

## 2024-07-01 RX ORDER — RISPERIDONE 0.5 MG/1
0.5 TABLET ORAL 3 TIMES DAILY
Qty: 90 TABLET | Refills: 0 | Status: SHIPPED | OUTPATIENT
Start: 2024-07-01 | End: 2024-07-01

## 2024-07-01 RX ORDER — CLOZAPINE 50 MG/1
50 TABLET ORAL DAILY
Qty: 14 TABLET | Refills: 0 | Status: SHIPPED | OUTPATIENT
Start: 2024-07-01 | End: 2024-07-15

## 2024-07-01 RX ORDER — NICOTINE 21 MG/24HR
1 PATCH, TRANSDERMAL 24 HOURS TRANSDERMAL DAILY
Qty: 28 PATCH | Refills: 0 | Status: SHIPPED | OUTPATIENT
Start: 2024-07-02

## 2024-07-01 RX ORDER — AMOXICILLIN 250 MG
1 CAPSULE ORAL 2 TIMES DAILY
Qty: 60 TABLET | Refills: 0 | Status: SHIPPED | OUTPATIENT
Start: 2024-07-01 | End: 2024-07-31

## 2024-07-01 RX ORDER — CLOZAPINE 50 MG/1
350 TABLET ORAL
Qty: 98 TABLET | Refills: 0 | Status: SHIPPED | OUTPATIENT
Start: 2024-07-01 | End: 2024-07-01

## 2024-07-01 RX ORDER — HYDROCORTISONE 25 MG/G
CREAM TOPICAL 4 TIMES DAILY PRN
Qty: 28 G | Refills: 0 | Status: SHIPPED | OUTPATIENT
Start: 2024-07-01

## 2024-07-01 RX ORDER — PANTOPRAZOLE SODIUM 40 MG/1
40 TABLET, DELAYED RELEASE ORAL
Qty: 30 TABLET | Refills: 0 | Status: SHIPPED | OUTPATIENT
Start: 2024-07-01 | End: 2024-07-31

## 2024-07-01 RX ORDER — CLOZAPINE 50 MG/1
350 TABLET ORAL
Qty: 98 TABLET | Refills: 0 | Status: SHIPPED | OUTPATIENT
Start: 2024-07-01 | End: 2024-07-03

## 2024-07-01 RX ORDER — POLYETHYLENE GLYCOL 3350 17 G/17G
17 POWDER, FOR SOLUTION ORAL DAILY
Qty: 510 G | Refills: 0 | Status: SHIPPED | OUTPATIENT
Start: 2024-07-01

## 2024-07-01 RX ORDER — LAMOTRIGINE 100 MG/1
100 TABLET ORAL 2 TIMES DAILY
Qty: 28 TABLET | Refills: 0 | Status: SHIPPED | OUTPATIENT
Start: 2024-07-01 | End: 2024-07-15

## 2024-07-01 RX ORDER — LANOLIN ALCOHOL/MO/W.PET/CERES
3 CREAM (GRAM) TOPICAL
Qty: 30 TABLET | Refills: 0 | Status: SHIPPED | OUTPATIENT
Start: 2024-07-01

## 2024-07-01 RX ORDER — ALBUTEROL SULFATE 90 UG/1
2 AEROSOL, METERED RESPIRATORY (INHALATION) EVERY 4 HOURS PRN
Qty: 18 G | Refills: 0 | Status: SHIPPED | OUTPATIENT
Start: 2024-07-01

## 2024-07-01 RX ORDER — FLUTICASONE PROPIONATE 50 MCG
1 SPRAY, SUSPENSION (ML) NASAL DAILY
Qty: 9.9 ML | Refills: 0 | Status: SHIPPED | OUTPATIENT
Start: 2024-07-01

## 2024-07-01 RX ORDER — GUAIFENESIN 1200 MG/1
1200 TABLET, EXTENDED RELEASE ORAL EVERY 12 HOURS SCHEDULED
Qty: 30 TABLET | Refills: 0 | Status: SHIPPED | OUTPATIENT
Start: 2024-07-01

## 2024-07-01 RX ORDER — MELOXICAM 15 MG/1
15 TABLET ORAL DAILY
Qty: 30 TABLET | Refills: 0 | Status: SHIPPED | OUTPATIENT
Start: 2024-07-01

## 2024-07-01 RX ORDER — LANOLIN ALCOHOL/MO/W.PET/CERES
100 CREAM (GRAM) TOPICAL DAILY
Qty: 30 TABLET | Refills: 0 | Status: SHIPPED | OUTPATIENT
Start: 2024-07-01

## 2024-07-01 RX ORDER — FOLIC ACID 1 MG/1
1 TABLET ORAL DAILY
Qty: 30 TABLET | Refills: 0 | Status: SHIPPED | OUTPATIENT
Start: 2024-07-01

## 2024-07-01 RX ADMIN — NYSTATIN 500000 UNITS: 100000 SUSPENSION ORAL at 20:36

## 2024-07-01 RX ADMIN — RISPERIDONE 0.5 MG: 0.5 TABLET, FILM COATED ORAL at 15:28

## 2024-07-01 RX ADMIN — Medication 1 TABLET: at 08:22

## 2024-07-01 RX ADMIN — PANTOPRAZOLE SODIUM 40 MG: 40 TABLET, DELAYED RELEASE ORAL at 05:57

## 2024-07-01 RX ADMIN — LORAZEPAM 0.5 MG: 0.5 TABLET ORAL at 08:22

## 2024-07-01 RX ADMIN — FLUTICASONE FUROATE AND VILANTEROL TRIFENATATE 1 PUFF: 100; 25 POWDER RESPIRATORY (INHALATION) at 08:20

## 2024-07-01 RX ADMIN — HYDROXYZINE HYDROCHLORIDE 50 MG: 50 TABLET, FILM COATED ORAL at 20:36

## 2024-07-01 RX ADMIN — NYSTATIN 500000 UNITS: 100000 SUSPENSION ORAL at 17:13

## 2024-07-01 RX ADMIN — FLUTICASONE PROPIONATE 1 SPRAY: 50 SPRAY, METERED NASAL at 08:21

## 2024-07-01 RX ADMIN — RISPERIDONE 0.5 MG: 0.5 TABLET, FILM COATED ORAL at 20:39

## 2024-07-01 RX ADMIN — NYSTATIN 500000 UNITS: 100000 SUSPENSION ORAL at 12:37

## 2024-07-01 RX ADMIN — SENNOSIDES AND DOCUSATE SODIUM 1 TABLET: 50; 8.6 TABLET ORAL at 08:22

## 2024-07-01 RX ADMIN — BENZONATATE 100 MG: 100 CAPSULE ORAL at 20:34

## 2024-07-01 RX ADMIN — RISPERIDONE 0.5 MG: 0.5 TABLET, FILM COATED ORAL at 08:22

## 2024-07-01 RX ADMIN — LAMOTRIGINE 100 MG: 100 TABLET ORAL at 17:13

## 2024-07-01 RX ADMIN — Medication 1000 UNITS: at 08:22

## 2024-07-01 RX ADMIN — POLYETHYLENE GLYCOL 3350 17 G: 17 POWDER, FOR SOLUTION ORAL at 08:24

## 2024-07-01 RX ADMIN — CLOZAPINE 50 MG: 25 TABLET ORAL at 08:21

## 2024-07-01 RX ADMIN — ATORVASTATIN CALCIUM 10 MG: 10 TABLET, FILM COATED ORAL at 15:28

## 2024-07-01 RX ADMIN — SENNOSIDES AND DOCUSATE SODIUM 1 TABLET: 50; 8.6 TABLET ORAL at 17:13

## 2024-07-01 RX ADMIN — GUAIFENESIN 600 MG: 600 TABLET ORAL at 20:34

## 2024-07-01 RX ADMIN — CLOZAPINE 350 MG: 100 TABLET ORAL at 20:35

## 2024-07-01 RX ADMIN — NICOTINE 1 PATCH: 14 PATCH, EXTENDED RELEASE TRANSDERMAL at 08:21

## 2024-07-01 RX ADMIN — FOLIC ACID 1 MG: 1 TABLET ORAL at 08:22

## 2024-07-01 RX ADMIN — MELOXICAM 15 MG: 7.5 TABLET ORAL at 08:21

## 2024-07-01 RX ADMIN — NYSTATIN 500000 UNITS: 100000 SUSPENSION ORAL at 08:23

## 2024-07-01 RX ADMIN — LORAZEPAM 0.5 MG: 0.5 TABLET ORAL at 17:13

## 2024-07-01 RX ADMIN — LAMOTRIGINE 100 MG: 100 TABLET ORAL at 08:22

## 2024-07-01 RX ADMIN — GUAIFENESIN 600 MG: 600 TABLET ORAL at 08:21

## 2024-07-01 RX ADMIN — THIAMINE HCL TAB 100 MG 100 MG: 100 TAB at 08:22

## 2024-07-01 NOTE — NURSING NOTE
"Patient observed within the milieu, socializing amongst peers. She is bright and polite on approach. She did report increased anxiety at 2146, received atarax 50 mg for mild anxiety for jerry of 7, patient reports calm mood at this time. She admits being admitted due to suicide attempt, states she was attempting to drink herself to death, attributes this due to her addition of Effexor, states she's been off of it for 6 days and feels \"100% better\". She adamantly denies SI. Denies HI/hallucinations. Compliant with medication as ordered. Q7 minute checks ongoing.   "

## 2024-07-01 NOTE — PLAN OF CARE
Problem: Depression  Goal: Refrain from harming self  Description: Interventions:  - Monitor patient closely, per order   - Supervise medication ingestion, monitor effects and side effects   Outcome: Progressing  Goal: Refrain from self-neglect  Outcome: Progressing

## 2024-07-01 NOTE — NURSING NOTE
"Upon assessment pt is observe din the common dining area. She is calm and cooperative. Blunted affect. Reports feeling \"okay\" today. She has been compliant with her scheduled medications. Her appetite is good. She reports mild anxiety. Denies Si/Hi and hallucinations. She has been up and ambulatory without difficulty. She is able to make her needs known and offers no current complaints/ concerns. Plan of care continues. Q7 minute safety checks in progress.   "

## 2024-07-01 NOTE — SOCIAL WORK
CM met with PT and reviewed discharge plan for tomorrow along with follow up care and supports. PT in agreement with all. PT denies si/hi/avh and reported anxiety and depression much improved. PT indicated that her BF will p/u at 1100am. PT indicated that she is ready for discharge. PT declined family contact. PT expressed gratitude.     PT and CM placed call to PT ACT team, spoke with Terrie, reviewed discharge plan for tomorrow. Terrie in agreement she will update team. Terrie in agreement with scripts being sent to [facility] pharmacy and indicated that nurse Amos would be out to PT home tomorrow after discharge to set up medications. Call ended mutually. [phone]     CM placed call to PCP office Dr. Lew, [phone] to notify of PT scheduled scheduled. Spoke with Ana , transferred to [facility], she will update team. PT is scheduled for follow up on 7/15/24 at 1:15pm. Call ended mutually.

## 2024-07-01 NOTE — PROGRESS NOTES
Progress Note - Behavioral Health     Jo-Ann Lamb 54 y.o. female MRN: [id number]   Unit/Bed#: [facility] 205-02 Encounter: [id number]    Behavior over the last 24 hours: improved.     Jo-Ann CUMMINGS was seen today, appears calm, cooperative and reports she is feeling better and ready to discuss her discharge plan. She reports making good progress with reduced anxiety, not experiencing paranoia with increased participation in groups. Denies any active suicidal ideation consistently. She has been compliant with medication and denies any current side effects. Med education and discharge plan were discussed. Patient verbalized understanding and agreement with the plan.    Sleep: improved  Appetite: normal  Medication side effects: denies  ROS: no complaints, all other systems are negative    Mental Status Evaluation:    Appearance:  age appropriate   Behavior:  cooperative, calm   Speech:  normal rate and volume   Mood:  anxious mld   Affect:  constricted   Thought Process:  goal directed   Associations: intact associations   Thought Content:  no overt delusions   Perceptual Disturbances: denies auditory hallucinations when asked   Risk Potential: Suicidal ideation - None  Homicidal ideation - None   Sensorium:  oriented to person, place, and time/date   Memory:  recent and remote memory grossly intact   Consciousness:  alert and awake   Attention: attention span and concentration appear shorter than expected for age   Insight:  improved   Judgment: fair   Gait/Station: normal gait/station   Motor Activity: no abnormal movements     Vital signs in last 24 hours:    Temp:  [97.2 °F (36.2 °C)-97.5 °F (36.4 °C)] 97.4 °F (36.3 °C)  HR:  [85-95] 95  Resp:  [16-18] 16  BP: (104-151)/(70-99) 104/70    Laboratory results: I have personally reviewed all pertinent laboratory/tests results.  Most Recent Labs:   Lab Results   Component Value Date    WBC 9.16 06/27/2024    RBC 4.65 06/27/2024    HGB 14.0 06/27/2024    HCT 43.3 06/27/2024    PLT  310 06/27/2024    RDW 14.1 06/27/2024    NEUTROABS 6.28 06/27/2024    SODIUM 139 06/27/2024    K 4.1 06/27/2024     06/27/2024    CO2 27 06/27/2024    BUN 9 06/27/2024    CREATININE 0.62 06/27/2024    GLUC 145 (H) 06/27/2024    GLUF 88 03/23/2024    CALCIUM 9.6 06/27/2024    AST 15 06/27/2024    ALT 17 06/27/2024    ALKPHOS 81 06/27/2024    TP 7.1 06/27/2024    ALB 4.2 06/27/2024    TBILI 0.29 06/27/2024    CHOLESTEROL 188 06/27/2024    HDL 62 06/27/2024    TRIG 216 (H) 06/27/2024    LDLCALC 83 06/27/2024    NONHDLC 126 06/27/2024    AMMONIA 28 10/27/2017    KDJ4BUYJIFWL 1.162 06/25/2024    FREET4 0.89 03/24/2016    PREGUR Negative 11/07/2022    PREGSERUM Negative 01/12/2024    HCG <2.00 04/10/2014    RPR Non-Reactive 12/07/2022    HGBA1C 5.6 12/02/2022     12/02/2022       Assessment & Plan   Principal Problem:    Schizoaffective disorder, bipolar type (HCC)  Active Problems:    Degenerative disc disease, lumbar    Hyperlipidemia    Post-traumatic stress disorder, chronic    Gastroesophageal reflux disease    Chronic obstructive pulmonary disease (HCC)    Recommended Treatment:     Planned medication and treatment changes:    All current active medications have been reviewed  Encourage group therapy, milieu therapy and occupational therapy  Behavioral Health checks every 7 minutes  Discharge planned for tomorrow    Current Facility-Administered Medications   Medication Dose Route Frequency Provider Last Rate    acetaminophen  650 mg Oral Q4H PRN Taylor Castro MD      acetaminophen  650 mg Oral Q4H PRN Taylor Castro MD      acetaminophen  975 mg Oral Q6H PRN Taylor Castro MD      albuterol  2 puff Inhalation Q4H PRN FAISAL Chandra-C      aluminum-magnesium hydroxide-simethicone  30 mL Oral Q4H PRN Taylor Castro MD      atorvastatin  10 mg Oral Daily With Dinner Payton Mojical, PA-C      benzonatate  100 mg Oral HS Zandra Barclay PA-C      Cholecalciferol  1,000 Units  Oral QAM Payton L Dagnall, PA-C      cloZAPine  350 mg Oral HS Jayson Giles MD      cloZAPine  50 mg Oral Daily Jayson Giles MD      fluticasone  1 spray Nasal Daily Payton L Dagnall, PA-C      Fluticasone Furoate-Vilanterol  1 puff Inhalation Daily Payton L Dagnall, PA-C      folic acid  1 mg Oral Daily Payton L Dagnall, PA-C      guaiFENesin  600 mg Oral Q12H KENNY Payton L Dagnall, PA-C      haloperidol lactate  5 mg Intramuscular Q4H PRN Max 4/day Taylor Castro MD      hydrocortisone   Topical 4x Daily PRN Payton L Claranall, PA-C      hydrOXYzine HCL  50 mg Oral Q6H PRN Max 4/day Jayson Giles MD      hydrOXYzine HCL  75 mg Oral Q6H PRN Max 4/day Jayson Giles MD      lamoTRIgine  100 mg Oral BID Jayson Giles MD      LORazepam  0.5 mg Oral Q8H PRN Jayson Giles MD      LORazepam  0.5 mg Oral BID Jayson Giles MD      melatonin  3 mg Oral HS Taylor Castro MD      meloxicam  15 mg Oral Daily Payton L Dagnall, PA-C      methocarbamol  500 mg Oral Q6H PRN Payton L Dagnall, PA-C      multivitamin-minerals  1 tablet Oral Daily Payton L Dagnall, PA-C      nicotine  1 patch Transdermal Daily JARED Carson      nicotine polacrilex  4 mg Oral Q2H PRN Taylor Castro MD      nystatin  500,000 Units Swish & Swallow 4x Daily Payton L Dagnall, PA-C      pantoprazole  40 mg Oral Daily Before Breakfast Payton L Dagnall, PA-C      phenol  1 spray Mouth/Throat Q2H PRN Payton L Dagnall, PA-C      polyethylene glycol  17 g Oral Daily Payton L Dagnall, PA-C      propranolol  5 mg Oral Q8H PRN Taylor Castro MD      risperiDONE  0.25 mg Oral Q4H PRN Max 6/day Taylor Castro MD      risperiDONE  0.5 mg Oral Q4H PRN Max 3/day Taylor Castro MD      risperiDONE  0.5 mg Oral TID Jayson Giles MD      risperiDONE  1 mg Oral Q2H PRN Max 3/day Taylor Castro MD      senna-docusate sodium  1 tablet Oral BID Payton Conrad PA-C      simethicone  80 mg Oral Q6H PRN Payton Conrad PA-C      thiamine   100 mg Oral Daily Payton Conrad PA-C      traZODone  50 mg Oral Q6H PRN Max 3/day Taylor Castro MD         Risks / Benefits of Treatment:    Risks, benefits, and possible side effects of medications explained to patient and patient verbalizes understanding and agreement for treatment.    Counseling / Coordination of Care:    Patient's progress discussed with staff in treatment team meeting.  Medications, treatment progress and treatment plan reviewed with patient.  Supportive therapy provided to patient.  Group attendance encouraged.  Discharge plan discussed with patient.    Jayson Giles MD 07/01/24

## 2024-07-01 NOTE — NURSING NOTE
Presents with baseline affect,denies depression,anxiety,SI,HI,AH,VH.shaina is visible on the unit,reserved,yet makes needs known,is compliant with medications,unit rules,appetite is adequate,and is independent with ambulation and ADLs.We discussed the importance and rationale of taking medications as prescribed.Will continue to educate,monitor,and provide safe,therapeutic milieu.

## 2024-07-01 NOTE — DISCHARGE INSTR - OTHER ORDERS
You are being discharged to your home located at [address] [place] [id number], Phone:  [phone]     Triggers you have identified during your hospitalization that led to your admission distressed mood, includes regression in mental health. Coping skills you have identified during your hospitalization include talking with others, music, movies. If you are unable to deal with your distressed mood alone please contact your A ACT team at  or your primary care provider [phone]. If that is not effective and you continue to have (ex: suicidal ideation, homicidal ideation, distressed mood, overwhelmed, in crisis) please contact (Crisis #) [facility] , dial 844 or go to the nearest emergency center.      *[facility] Crisis Hotline: 593.451.1737  *[place] Suicide Prevention Lifeline:  1-251.486.8628  *Alcohol Anonymous: 605.660.9514  *[facility] Drug & Alcohol Commission: (566) 918-5840  *National [employer] Mental [employer]ness (MIKE) HELPLINE: 465.841.3955/Website: www.mike.org  *Substance Abuse and Mental Health Services Administration([facility]) National Helpline, which is a confidential, free, 24-hour-a-day, 365-day-a-year, information service for individuals and family members facing mental health and/or substance use disorders. This service provides referrals to local treatment facilities, support groups, and community-based organizations. Callers can also order free publications and other information.  Call 1-299.476.9862/Website: www.[facility].gov  *[facility] 2-1-1: This is a toll free, confidential, 24-hour-a-day service which connects you to a community  in your area who can help you find services and resources that are available to you locally and provide critical services that can improve and save lives.  Call: 211  /Website: http://www.c3 creationsorg/       Demetra, or Mayi, our Behavioral Health Nurse Navigators, will be calling you after  your discharge, on the phone number that you provided.  They will be available as an additional support, if needed.   If you wish to speak with one of them, you may contact Demetra at [phone] or Mayi at [phone].  You are being discharged to

## 2024-07-01 NOTE — PROGRESS NOTES
"Progress Note - Jo-Ann Lamb 54 y.o. female MRN: [id number]    Unit/Bed#: [facility] 205-02 Encounter: [id number]        Subjective:   Patient seen and examined at bedside after reviewing the chart and discussing the case with the caring staff.      Patient examined at bedside.  Patient has no acute symptoms.    Patient is being discharged on Tuesday, 7/2/2024.  Patient is requesting her prescriptions.  I reviewed and reconciled patient's problem list and medications.    Physical Exam   Vitals: Blood pressure 104/70, pulse 95, temperature (!) 97.4 °F (36.3 °C), temperature source Temporal, resp. rate 16, height 5' 5\" (1.651 m), weight 70 kg (154 lb 6.4 oz), last menstrual period 01/01/2020, SpO2 95%, not currently breastfeeding.,Body mass index is 25.69 kg/m².  Constitutional: Patient in no acute distress.  HEENT: PERR, EOMI, MMM.  Cardiovascular: Normal rate and regular rhythm.    Pulmonary/Chest: Effort normal and breath sounds normal.   Abdomen: Soft, + BS, NT.    Assessment/Plan:  Jo-Ann Lamb is a(n) 54 y.o. female with schizoaffective disorder.    Medical clearance.  Patient is medically cleared for discharge.  All scripts will be sent out for the patient.     Hyperlipidemia.  Continue atorvastatin 10 mg daily.  Asthma.  Patient is on Breo Ellipta daily (Advair nonform), albuterol inhaler as needed.   Allergic rhinitis.  Continue Flonase nasal spray daily.   GERD.  Patient on Protonix 40 mg daily.   Tobacco abuse.  NRT.   Alcohol abuse.  Patient started on thiamine, folic acid, MVI.   Chronic back pain.  Continue Mobic 15 mg daily, Robaxin as needed.  Vitamin D deficiency.  Continue vitamin D3 1000 units daily.  Constipation/hemorrhoids.  Continue senokot S twice daily and Miralax daily.  Apply Anusol-HC as needed.   Cough/congestion/sore throat.  Started on Mucinex twice daily.  Tessalon perles daily at bedtime. Chloraseptic spray as needed.  Thrush.  Patient started on nystatin swish QID x 10 days.  Discussed " rinsing mouth thoroughly after using inhalers.

## 2024-07-01 NOTE — PLAN OF CARE
Problem: Alteration in Thoughts and Perception  Goal: Treatment Goal: Gain control of psychotic behaviors/thinking, reduce/eliminate presenting symptoms and demonstrate improved reality functioning upon discharge  Outcome: Progressing     Problem: Depression  Goal: Refrain from harming self  Description: Interventions:  - Monitor patient closely, per order   - Supervise medication ingestion, monitor effects and side effects   Outcome: Progressing     Problem: Depression  Goal: Refrain from self-neglect  Outcome: Progressing

## 2024-07-01 NOTE — DISCHARGE SUMMARY
Discharge Summary - Behavioral Health   Jo-Ann Lamb 54 y.o. female MRN: [id number]  Unit/Bed#: [facility] 205-02 Encounter: [id number]     Admission Date: 6/26/2024         Discharge Date: 7/2/2024    Attending Psychiatrist: Jayson Giles MD    Reason for Admission/HPI: MDD (major depressive disorder) [F32.9]  Major depressive disorder [F32.9]      History of Present Illness:    Jo-Ann Lamb is a 54 y.o. female with a history of Schizoaffective Disorder, anxiety, and PTSD, alcohol abuse who was admitted to the inpatient older adult psychiatric unit on a voluntary 201 commitment basis due to depression, anxiety, unstable mood, auditory hallucinations, and suicidal ideation. She was brought to ED by EMS due to worsening of paranoid delusions, AH, SI and HI toward strangers entering her house. UDS was negative. Alcohol level 193, QTC interval 503. WBC 13.06 ANC 8.89  On evaluation in the inpatient psychiatric unit Jo-Ann CUMMINGS present initially guarded suspicious but later was able to answer question appropriately. Patient claims she has been feeling increasingly depressed, paranoid and scared somebody was entering to her house to harm her. She also admits worsening of suicidal ideation with plan to OD on medication along with alcohol as she did in the past. She feels safe in the unit and denies any active plan or intent to hurt herself presently. Patient has been struggling with anxiety, depression  with chronic hallucinations that to multiple inpatient admissions. State she was taking her meds which include Clozaril, Risperidal and Effexor. She believes Effexor has been not effective.   Patient admits intermittent alcohol misuse but minimized its negative consequences. She agreed to be compliant with medication and treatment plan.       Hospital Course: The patient was admitted to the inpatient psychiatric unit and started on every 7 minutes precautions. During the hospitalization the patient was attending individual therapy,  group therapy, milieu therapy and occupational therapy.    Psychiatric medications were titrated over the hospital stay. To address mood instability, paranoid ideation, auditory hallucinations, anxiety symptoms, and insomnia the patient was started on mood stabilizer Lamictal, antipsychotic medication Risperdal and Clozaril, anxiolytic medication Ativan, and hypnotic medication Melatonin. Medication doses were titrated during the hospital course. Prior to beginning of treatment medications risks and benefits and possible side effects including risk of rash related to treatment with Lamictal, risk of parkinsonian symptoms, Tardive Dyskinesia and metabolic syndrome related to treatment with antipsychotic medications, risk of cardiovascular events in elderly related to treatment with antipsychotic medications, risk of suicidality and serotonin syndrome related to treatment with antidepressants, risks of misuse, abuse or dependence, sedation and respiratory depression related to treatment with benzodiazepine medications, and risks of cardiovascular side effects including elevated blood pressure, risk of misuse, abuse or dependence and risk of increased anxiety related to treatment with stimulant medications were reviewed with the patient. The patient verbalized understanding and agreement for treatment.     Patient's symptoms improved gradually over the hospital course. At the end of treatment the patient was doing well. Mood was stable at the time of discharge. The patient denied suicidal ideation, intent or plan at the time of discharge and denied homicidal ideation, intent or plan at the time of discharge. There was no overt psychosis at the time of discharge. Sleep and appetite were improved. The patient was tolerating medications and was not reporting any significant side effects at the time of discharge. Pt believes  Effexor was causing side effects and tolerated gradual taper off. Due to chronic nature her mental  illness and frequent readmissions, she has been requiring two antipsychotics for the past few years and shows no side effects.      Following are her discharge meds:  Clozaril 50 mg po in the morning + 350 mg po hs.  Lamictal 100 mg po bid  Ativan 0.5 mg po bid.Melatonin 3 mg po hs.  Risperidal 0.5 mg po tid.      Since the patient was doing well at the end of the hospitalization, treatment team felt that the patient could be safely discharged to outpatient care with ACT team. The outpatient follow up with  was arranged by the unit  upon discharge.    Mental Status at time of Discharge:     Appearance:  age appropriate   Behavior:  cooperative   Speech:  normal volume   Mood:  euthymic   Affect:  mood-congruent   Thought Process:  goal directed   Thought Content:  no overt delusions   Perceptual Disturbances: None   Risk Potential: Suicidal Ideations none, HI none   Sensorium:  person, place, and time/date   Cognition:  recent and remote memory grossly intact   Consciousness:  alert and awake    Attention: attention span and concentration were age appropriate   Insight:  fair   Judgment: fair   Gait/Station: normal gait/station   Motor Activity: no abnormal movements     Admission Diagnosis:MDD (major depressive disorder) [F32.9]  Major depressive disorder [F32.9]    Discharge Diagnosis:   Principal Problem:    Schizoaffective disorder, bipolar type ([facility])  Active Problems:    Degenerative disc disease, lumbar    Hyperlipidemia    Post-traumatic stress disorder, chronic    Gastroesophageal reflux disease  Resolved Problems:    Chronic obstructive pulmonary disease ([facility])    Lab results:  Admission on 06/26/2024   Component Date Value    Sodium 06/27/2024 139     Potassium 06/27/2024 4.1     Chloride 06/27/2024 103     CO2 06/27/2024 27     ANION GAP 06/27/2024 9     BUN 06/27/2024 9     Creatinine 06/27/2024 0.62     Glucose 06/27/2024 145 (H)     Calcium 06/27/2024 9.6     AST 06/27/2024 15     ALT  06/27/2024 17     Alkaline Phosphatase 06/27/2024 81     Total Protein 06/27/2024 7.1     Albumin 06/27/2024 4.2     Total Bilirubin 06/27/2024 0.29     eGFR 06/27/2024 103     WBC 06/27/2024 9.16     RBC 06/27/2024 4.65     Hemoglobin 06/27/2024 14.0     Hematocrit 06/27/2024 43.3     MCV 06/27/2024 93     MCH 06/27/2024 30.1     MCHC 06/27/2024 32.3     RDW 06/27/2024 14.1     MPV 06/27/2024 9.5     Platelets 06/27/2024 310     nRBC 06/27/2024 0     Segmented % 06/27/2024 69     Immature Grans % 06/27/2024 1     Lymphocytes % 06/27/2024 19     Monocytes % 06/27/2024 6     Eosinophils Relative 06/27/2024 5     Basophils Relative 06/27/2024 0     Absolute Neutrophils 06/27/2024 6.28     Absolute Immature Grans 06/27/2024 0.05     Absolute Lymphocytes 06/27/2024 1.77     Absolute Monocytes 06/27/2024 0.57     Eosinophils Absolute 06/27/2024 0.47     Basophils Absolute 06/27/2024 0.02     Vitamin B-12 06/27/2024 490     Folate 06/27/2024 >22.3     Vit D, 25-Hydroxy 06/27/2024 39.5     Cholesterol 06/27/2024 188     Triglycerides 06/27/2024 216 (H)     HDL, Direct 06/27/2024 62     LDL Calculated 06/27/2024 83     Non-HDL-Chol (CHOL-HDL) 06/27/2024 126     Syphilis Total Antibody 06/27/2024 Non-reactive     Ventricular Rate 06/27/2024 79     Atrial Rate 06/27/2024 79     SC Interval 06/27/2024 158     QRSD Interval 06/27/2024 72     QT Interval 06/27/2024 406     QTC Interval 06/27/2024 465     P Axis 06/27/2024 45     QRS Axis 06/27/2024 9     T Wave [place] 06/27/2024 51        Discharge Medications:  Current Discharge Medication List        START taking these medications    Details   melatonin 3 mg Take 1 tablet (3 mg total) by mouth daily at bedtime  Qty: 30 tablet, Refills: 0    Associated Diagnoses: Schizoaffective disorder, bipolar type (HCC)      nicotine (NICODERM CQ) 14 mg/24hr TD 24 hr patch Place 1 patch on the skin over 24 hours daily  Qty: 28 patch, Refills: 0    Associated Diagnoses: Nicotine use disorder       nicotine polacrilex (NICORETTE) 4 mg gum Chew 1 each (4 mg total) every 2 (two) hours as needed for smoking cessation  Qty: 100 each, Refills: 0    Associated Diagnoses: Nicotine use disorder      nystatin (MYCOSTATIN) 500,000 units/5 mL suspension Swish and swallow 5 mL (500,000 Units total) 4 (four) times a day for 12 doses  Qty: 60 mL, Refills: 0    Associated Diagnoses: Intertrigo      phenol (CHLORASEPTIC) 1.4 % mucosal liquid Apply 1 spray to the mouth or throat every 2 (two) hours as needed (Throat and mouth pain)  Qty: 177 mL, Refills: 0    Associated Diagnoses: Mouth sores              Current Discharge Medication List        STOP taking these medications       Hydrocortisone, Perianal, (Proctocort) 1 % CREA Comments:   Reason for Stopping:         lactulose (CHRONULAC) 10 g/15 mL solution Comments:   Reason for Stopping:         lidocaine (LIDODERM) 5 % Comments:   Reason for Stopping:         venlafaxine (EFFEXOR-XR) 37.5 mg 24 hr capsule Comments:   Reason for Stopping:         fenofibrate (TRICOR) 145 mg tablet Comments:   Reason for Stopping:                Current Discharge Medication List        CONTINUE these medications which have CHANGED    Details   albuterol (PROVENTIL HFA,VENTOLIN HFA) 90 mcg/act inhaler Inhale 2 puffs every 4 (four) hours as needed for wheezing  Qty: 18 g, Refills: 0    Comments: Substitution to a formulary equivalent within the same pharmaceutical class is authorized.  Associated Diagnoses: Mild intermittent asthmatic bronchitis with acute exacerbation      atorvastatin (LIPITOR) 10 mg tablet Take 1 tablet (10 mg total) by mouth daily  Qty: 90 tablet, Refills: 0    Associated Diagnoses: Mixed hyperlipidemia      Cholecalciferol (VITAMIN D3) 1,000 units tablet Take 1 tablet (1,000 Units total) by mouth every morning Dx: Supplement  Qty: 30 tablet, Refills: 0    Associated Diagnoses: Vitamin D deficiency      !! clozapine (CLOZARIL) 50 MG tablet Take 7 tablets (350 mg  total) by mouth daily at bedtime for 14 days  Qty: 98 tablet, Refills: 0    Associated Diagnoses: Schizoaffective disorder, bipolar type (HCC)      !! clozapine (CLOZARIL) 50 MG tablet Take 1 tablet (50 mg total) by mouth in the morning for 14 days  Qty: 14 tablet, Refills: 0    Associated Diagnoses: Schizoaffective disorder, bipolar type (HCC)      fluticasone (FLONASE) 50 mcg/act nasal spray 1 spray into each nostril daily  Qty: 9.9 mL, Refills: 0    Associated Diagnoses: Congestion of nasal sinus      fluticasone-salmeterol (Advair HFA) 115-21 MCG/ACT inhaler Inhale 2 puffs 2 (two) times a day Rinse mouth after use.  Qty: 12 g, Refills: 0    Comments: Substitution to a formulary equivalent within the same pharmaceutical class is authorized.  Associated Diagnoses: Pulmonary emphysema, unspecified emphysema type (HCC)      folic acid (FOLVITE) 1 mg tablet Take 1 tablet (1 mg total) by mouth daily  Qty: 30 tablet, Refills: 0    Associated Diagnoses: Alcohol abuse      Guaifenesin 1200 MG TB12 Take 1 tablet (1,200 mg total) by mouth every 12 (twelve) hours  Qty: 30 tablet, Refills: 0    Associated Diagnoses: Cough      hydrocortisone (ANUSOL-HC) 2.5 % rectal cream Apply topically 4 (four) times a day as needed for hemorrhoids  Qty: 28 g, Refills: 0    Associated Diagnoses: Hemorrhoids, unspecified hemorrhoid type      lamoTRIgine (LaMICtal) 100 mg tablet Take 1 tablet (100 mg total) by mouth 2 (two) times a day for 14 days  Qty: 28 tablet, Refills: 0    Associated Diagnoses: Schizoaffective disorder, bipolar type (HCC)      LORazepam (ATIVAN) 0.5 mg tablet Take 1 tablet (0.5 mg total) by mouth 2 (two) times a day for 15 days  Qty: 30 tablet, Refills: 0    Associated Diagnoses: Schizoaffective disorder, bipolar type (HCC); Post-traumatic stress disorder, chronic      meloxicam (MOBIC) 15 mg tablet Take 1 tablet (15 mg total) by mouth daily  Qty: 30 tablet, Refills: 0    Associated Diagnoses: Back pain       methocarbamol (ROBAXIN) 500 mg tablet Take 1 tablet (500 mg total) by mouth 2 (two) times a day  Qty: 30 tablet, Refills: 0    Associated Diagnoses: Back pain      pantoprazole (PROTONIX) 40 mg tablet Take 1 tablet (40 mg total) by mouth daily before breakfast  Qty: 30 tablet, Refills: 0    Associated Diagnoses: Gastroesophageal reflux disease without esophagitis      polyethylene glycol (GLYCOLAX) 17 GM/SCOOP powder Take 17 g by mouth daily  Qty: 510 g, Refills: 0    Associated Diagnoses: Chronic idiopathic constipation      risperiDONE (RisperDAL) 0.5 mg tablet Take 1 tablet (0.5 mg total) by mouth 3 (three) times a day  Qty: 90 tablet, Refills: 0    Associated Diagnoses: Schizoaffective disorder, bipolar type (HCC)      senna-docusate sodium (SENOKOT S) 8.6-50 mg per tablet Take 1 tablet by mouth 2 (two) times a day  Qty: 60 tablet, Refills: 0    Comments: Constipation prophylaxis with Clozaril therapy  Associated Diagnoses: Slow transit constipation      simethicone (MYLICON) 80 mg chewable tablet Chew 1 tablet (80 mg total) 4 (four) times a day (with meals and at bedtime)  Qty: 30 tablet, Refills: 0    Associated Diagnoses: Chronic idiopathic constipation      thiamine 100 MG tablet Take 1 tablet (100 mg total) by mouth daily  Qty: 30 tablet, Refills: 0    Associated Diagnoses: Alcohol abuse       !! - Potential duplicate medications found. Please discuss with provider.           Current Discharge Medication List        CONTINUE these medications which have NOT CHANGED    Details   benzonatate (TESSALON PERLES) 100 mg capsule Take 1 capsule (100 mg total) by mouth 3 (three) times a day  Qty: 30 capsule, Refills: 0    Associated Diagnoses: Cough      Incontinence Supply Disposable (Depend Underwear Sm/Med) MISC Use 3 (three) times a day as needed (incontinence)  Qty: 138 each, Refills: 7    Associated Diagnoses: Mixed stress and urge urinary incontinence              Discharge instructions/Information to  patient and family:   See after visit summary for information provided to patient and family.      Provisions for Follow-Up Care:  See after visit summary for information related to follow-up care and any pertinent home health orders.      Discharge Statement   I spent 20 minutes discharging the patient. This time was spent on the day of discharge. I had direct contact with the patient on the day of discharge. Additional documentation is required if more than 30 minutes were spent on discharge.

## 2024-07-02 VITALS
TEMPERATURE: 97.6 F | SYSTOLIC BLOOD PRESSURE: 124 MMHG | HEART RATE: 96 BPM | WEIGHT: 154.4 LBS | BODY MASS INDEX: 25.72 KG/M2 | HEIGHT: 65 IN | RESPIRATION RATE: 18 BRPM | DIASTOLIC BLOOD PRESSURE: 74 MMHG | OXYGEN SATURATION: 95 %

## 2024-07-02 PROBLEM — J44.9 CHRONIC OBSTRUCTIVE PULMONARY DISEASE (HCC): Status: RESOLVED | Noted: 2023-11-28 | Resolved: 2024-07-02

## 2024-07-02 PROCEDURE — 99238 HOSP IP/OBS DSCHRG MGMT 30/<: CPT | Performed by: PSYCHIATRY & NEUROLOGY

## 2024-07-02 RX ADMIN — CLOZAPINE 50 MG: 25 TABLET ORAL at 08:15

## 2024-07-02 RX ADMIN — NYSTATIN 500000 UNITS: 100000 SUSPENSION ORAL at 08:19

## 2024-07-02 RX ADMIN — POLYETHYLENE GLYCOL 3350 17 G: 17 POWDER, FOR SOLUTION ORAL at 08:17

## 2024-07-02 RX ADMIN — SENNOSIDES AND DOCUSATE SODIUM 1 TABLET: 50; 8.6 TABLET ORAL at 08:17

## 2024-07-02 RX ADMIN — THIAMINE HCL TAB 100 MG 100 MG: 100 TAB at 08:17

## 2024-07-02 RX ADMIN — Medication 1 TABLET: at 08:16

## 2024-07-02 RX ADMIN — FLUTICASONE FUROATE AND VILANTEROL TRIFENATATE 1 PUFF: 100; 25 POWDER RESPIRATORY (INHALATION) at 08:18

## 2024-07-02 RX ADMIN — RISPERIDONE 0.5 MG: 0.5 TABLET, FILM COATED ORAL at 08:16

## 2024-07-02 RX ADMIN — LAMOTRIGINE 100 MG: 100 TABLET ORAL at 08:17

## 2024-07-02 RX ADMIN — LORAZEPAM 0.5 MG: 0.5 TABLET ORAL at 08:16

## 2024-07-02 RX ADMIN — GUAIFENESIN 600 MG: 600 TABLET ORAL at 08:16

## 2024-07-02 RX ADMIN — Medication 1000 UNITS: at 08:17

## 2024-07-02 RX ADMIN — MELOXICAM 15 MG: 7.5 TABLET ORAL at 08:15

## 2024-07-02 RX ADMIN — FOLIC ACID 1 MG: 1 TABLET ORAL at 08:16

## 2024-07-02 RX ADMIN — PANTOPRAZOLE SODIUM 40 MG: 40 TABLET, DELAYED RELEASE ORAL at 06:01

## 2024-07-02 RX ADMIN — FLUTICASONE PROPIONATE 1 SPRAY: 50 SPRAY, METERED NASAL at 08:18

## 2024-07-02 NOTE — SOCIAL WORK
OLIVE placed call to Dander with [facility], updated on PT status and plan of care, along with discharge plan for today. Dandre confirmed that he will visit with PT this afternoon and set up her bubble packs. Confirmed scripts were sent to [facility]. Dandre in agreement. Call ended mutually. [phone]

## 2024-07-02 NOTE — BH TRANSITION RECORD
Contact Information: If you have any questions, concerns, pended studies, tests and/or procedures, or emergencies regarding your inpatient behavioral health visit. Please contact [facility] at  and ask to speak to a , nurse or physician. A contact is available 24 hours/ 7 days a week at this number.     Summary of Procedures Performed During your Stay:  Below is a list of major procedures performed during your hospital stay and a summary of results:  - No major procedures performed.    Pending Studies (From admission, onward)      None          Please follow up on the above pending studies with your PCP and/or referring provider.

## 2024-07-02 NOTE — NURSING NOTE
"Patient visible in  milieu, pleasant and cooperative in interaction. Social with staff and select peers. Patient denies anxiety/depression, SI/HI, hallucinations. Remains medication compliant and on 7\" checks for safety and behaviors.  "

## 2024-07-02 NOTE — PLAN OF CARE
Problem: Depression  Goal: Refrain from harming self  Description: Interventions:  - Monitor patient closely, per order   - Supervise medication ingestion, monitor effects and side effects   Outcome: Progressing     Problem: Ineffective Coping  Goal: Participates in unit activities  Description: Interventions:  - Provide therapeutic environment   - Provide required programming   - Redirect inappropriate behaviors   Outcome: Progressing

## 2024-07-02 NOTE — PROGRESS NOTES
"Progress Note - Jo-Ann Lamb 54 y.o. female MRN: [id number]    Unit/Bed#: -02 Encounter: [id number]        Subjective:   Patient seen and examined at bedside after reviewing the chart and discussing the case with the caring staff.      Patient examined at bedside.  Patient has no acute symptoms.    Patient is being discharged on Tuesday, 7/2/2024.     Physical Exam   Vitals: Blood pressure 124/74, pulse 96, temperature 97.6 °F (36.4 °C), temperature source Temporal, resp. rate 18, height 5' 5\" (1.651 m), weight 70 kg (154 lb 6.4 oz), last menstrual period 01/01/2020, SpO2 95%, not currently breastfeeding.,Body mass index is 25.69 kg/m².  Constitutional: Patient in no acute distress.  HEENT: PERR, EOMI, MMM.  Cardiovascular: Normal rate and regular rhythm.    Pulmonary/Chest: Effort normal and breath sounds normal.   Abdomen: Soft, + BS, NT.    Assessment/Plan:  Jo-Ann Lamb is a(n) 54 y.o. female with schizoaffective disorder.    Medical clearance.  Patient is medically cleared for discharge.  All scripts will be sent out for the patient.     Hyperlipidemia.  Continue atorvastatin 10 mg daily.  Asthma.  Patient is on Breo Ellipta daily (Advair nonform), albuterol inhaler as needed.   Allergic rhinitis.  Continue Flonase nasal spray daily.   GERD.  Patient on Protonix 40 mg daily.   Tobacco abuse.  NRT.   Alcohol abuse.  Patient started on thiamine, folic acid, MVI.   Chronic back pain.  Continue Mobic 15 mg daily, Robaxin as needed.  Vitamin D deficiency.  Continue vitamin D3 1000 units daily.  Constipation/hemorrhoids.  Continue senokot S twice daily and Miralax daily.  Apply Anusol-HC as needed.   Cough/congestion/sore throat.  Started on Mucinex twice daily.  Tessalon perles daily at bedtime.  Chloraseptic spray as needed.  Thrush.  Patient started on nystatin swish QID x 10 days.  Discussed rinsing mouth thoroughly after using inhalers.     The patient was discussed with Dr. Steel and he is in agreement " with the above note.

## 2024-07-02 NOTE — PROGRESS NOTES
Met with Jo-Ann completed her relapse prevention. She identified her protective factors,warning signs, stressors, coping skills and support people. We reviewed the community supports and she is looking forward to discharge today.

## 2024-07-02 NOTE — NURSING NOTE
"Nurse attempted to review discharge instructions with patient. Patient stated, \"We don't have to do this, review medication, I know my meds\". Patient discharged, ambulatory,  to home with all belongings and discharge instructions.Patient accompanied by staff to car.   "

## 2024-07-02 NOTE — NURSING NOTE
Presents with c/o anxiety RT slated DC tomorrow, she does state she feels ready.HA of 15:offered and accepted 50 mg of atarax with education on expected therapeutics and SE to report.Will monitor.

## 2024-07-03 DIAGNOSIS — F25.0 SCHIZOAFFECTIVE DISORDER, BIPOLAR TYPE (HCC): Chronic | ICD-10-CM

## 2024-07-03 RX ORDER — CLOZAPINE 50 MG/1
350 TABLET ORAL
Qty: 98 TABLET | Refills: 0 | Status: SHIPPED | OUTPATIENT
Start: 2024-07-03 | End: 2024-07-17

## 2024-07-03 NOTE — PROGRESS NOTES
07/02/24 0930   Team Meeting   Meeting Type Daily Rounds   Team Members Present   Team Members Present Physician;Nurse;   Physician Team Member Dr. Tisha MD; JARED Platt   Nursing Team Member Ann Phillips RN   Care Management Team Member Marium Wren, MS, NCC, LPC   Patient/Family Present   Patient Present No   Patient's Family Present No   D/c today to home, will follow up with [facility]. Prn, denies all, pleasant, medication compliant.

## 2024-07-03 NOTE — PROGRESS NOTES
07/01/24 0930   Team Meeting   Meeting Type Daily Rounds   Team Members Present   Team Members Present Physician;Nurse;   Physician Team Member Dr. Tisha MD; JARED Platt   Nursing Team Member Missy Hernandez RN   Care Management Team Member Marium Wren, MS, NCC, LPC   Patient/Family Present   Patient Present No   Patient's Family Present No   D/c to home tomorrow and follow up with act services. Denies all, blunted. Medication compliant.

## 2024-07-05 ENCOUNTER — TRANSITIONAL CARE MANAGEMENT (OUTPATIENT)
Dept: FAMILY MEDICINE CLINIC | Facility: CLINIC | Age: 54
End: 2024-07-05

## 2024-07-10 ENCOUNTER — HOSPITAL ENCOUNTER (EMERGENCY)
Facility: HOSPITAL | Age: 54
Discharge: HOME | End: 2024-07-11
Attending: EMERGENCY MEDICINE
Payer: COMMERCIAL

## 2024-07-10 DIAGNOSIS — F10.10 ALCOHOL ABUSE: ICD-10-CM

## 2024-07-10 DIAGNOSIS — F32.A DEPRESSION: ICD-10-CM

## 2024-07-10 DIAGNOSIS — R45.851 SUICIDAL IDEATIONS: Primary | ICD-10-CM

## 2024-07-10 LAB
ALBUMIN SERPL BCG-MCNC: 4.4 G/DL (ref 3.5–5)
ALP SERPL-CCNC: 89 U/L (ref 34–104)
ALT SERPL W P-5'-P-CCNC: 25 U/L (ref 7–52)
AMPHETAMINES SERPL QL SCN: NEGATIVE
ANION GAP SERPL CALCULATED.3IONS-SCNC: 16 MMOL/L (ref 4–13)
AST SERPL W P-5'-P-CCNC: 23 U/L (ref 13–39)
BARBITURATES UR QL: NEGATIVE
BASOPHILS # BLD AUTO: 0.05 THOUSANDS/ÂΜL (ref 0–0.1)
BASOPHILS NFR BLD AUTO: 0 % (ref 0–1)
BENZODIAZ UR QL: NEGATIVE
BILIRUB SERPL-MCNC: 0.24 MG/DL (ref 0.2–1)
BILIRUB UR QL STRIP: NEGATIVE
BUN SERPL-MCNC: 4 MG/DL (ref 5–25)
CALCIUM SERPL-MCNC: 9.5 MG/DL (ref 8.4–10.2)
CHLORIDE SERPL-SCNC: 97 MMOL/L (ref 96–108)
CLARITY UR: CLEAR
CO2 SERPL-SCNC: 24 MMOL/L (ref 21–32)
COCAINE UR QL: NEGATIVE
COLOR UR: YELLOW
CREAT SERPL-MCNC: 0.54 MG/DL (ref 0.6–1.3)
EOSINOPHIL # BLD AUTO: 0.44 THOUSAND/ÂΜL (ref 0–0.61)
EOSINOPHIL NFR BLD AUTO: 4 % (ref 0–6)
ERYTHROCYTE [DISTWIDTH] IN BLOOD BY AUTOMATED COUNT: 13.2 % (ref 11.6–15.1)
ETHANOL SERPL-MCNC: 87 MG/DL
FENTANYL UR QL SCN: NEGATIVE
GFR SERPL CREATININE-BSD FRML MDRD: 108 ML/MIN/1.73SQ M
GLUCOSE SERPL-MCNC: 88 MG/DL (ref 65–140)
GLUCOSE UR STRIP-MCNC: NEGATIVE MG/DL
HCT VFR BLD AUTO: 40.4 % (ref 34.8–46.1)
HGB BLD-MCNC: 13.2 G/DL (ref 11.5–15.4)
HGB UR QL STRIP.AUTO: NEGATIVE
HYDROCODONE UR QL SCN: NEGATIVE
IMM GRANULOCYTES # BLD AUTO: 0.09 THOUSAND/UL (ref 0–0.2)
IMM GRANULOCYTES NFR BLD AUTO: 1 % (ref 0–2)
KETONES UR STRIP-MCNC: NEGATIVE MG/DL
LEUKOCYTE ESTERASE UR QL STRIP: NEGATIVE
LYMPHOCYTES # BLD AUTO: 2.67 THOUSANDS/ÂΜL (ref 0.6–4.47)
LYMPHOCYTES NFR BLD AUTO: 24 % (ref 14–44)
MCH RBC QN AUTO: 29.7 PG (ref 26.8–34.3)
MCHC RBC AUTO-ENTMCNC: 32.7 G/DL (ref 31.4–37.4)
MCV RBC AUTO: 91 FL (ref 82–98)
METHADONE UR QL: NEGATIVE
MONOCYTES # BLD AUTO: 0.71 THOUSAND/ÂΜL (ref 0.17–1.22)
MONOCYTES NFR BLD AUTO: 6 % (ref 4–12)
NEUTROPHILS # BLD AUTO: 7.41 THOUSANDS/ÂΜL (ref 1.85–7.62)
NEUTS SEG NFR BLD AUTO: 65 % (ref 43–75)
NITRITE UR QL STRIP: NEGATIVE
NRBC BLD AUTO-RTO: 0 /100 WBCS
OPIATES UR QL SCN: NEGATIVE
OXYCODONE+OXYMORPHONE UR QL SCN: NEGATIVE
PCP UR QL: NEGATIVE
PH UR STRIP.AUTO: 6 [PH]
PLATELET # BLD AUTO: 288 THOUSANDS/UL (ref 149–390)
PMV BLD AUTO: 8.8 FL (ref 8.9–12.7)
POTASSIUM SERPL-SCNC: 3.2 MMOL/L (ref 3.5–5.3)
PROT SERPL-MCNC: 7 G/DL (ref 6.4–8.4)
PROT UR STRIP-MCNC: NEGATIVE MG/DL
RBC # BLD AUTO: 4.44 MILLION/UL (ref 3.81–5.12)
SODIUM SERPL-SCNC: 137 MMOL/L (ref 135–147)
SP GR UR STRIP.AUTO: <=1.005
THC UR QL: NEGATIVE
TSH SERPL DL<=0.05 MIU/L-ACNC: 1.14 UIU/ML (ref 0.45–4.5)
UROBILINOGEN UR QL STRIP.AUTO: 0.2 E.U./DL
WBC # BLD AUTO: 11.37 THOUSAND/UL (ref 4.31–10.16)

## 2024-07-10 PROCEDURE — 81003 URINALYSIS AUTO W/O SCOPE: CPT | Performed by: EMERGENCY MEDICINE

## 2024-07-10 PROCEDURE — 80307 DRUG TEST PRSMV CHEM ANLYZR: CPT | Performed by: EMERGENCY MEDICINE

## 2024-07-10 PROCEDURE — 36415 COLL VENOUS BLD VENIPUNCTURE: CPT | Performed by: EMERGENCY MEDICINE

## 2024-07-10 PROCEDURE — 84443 ASSAY THYROID STIM HORMONE: CPT | Performed by: EMERGENCY MEDICINE

## 2024-07-10 PROCEDURE — 82077 ASSAY SPEC XCP UR&BREATH IA: CPT

## 2024-07-10 PROCEDURE — 80053 COMPREHEN METABOLIC PANEL: CPT | Performed by: EMERGENCY MEDICINE

## 2024-07-10 PROCEDURE — 99285 EMERGENCY DEPT VISIT HI MDM: CPT

## 2024-07-10 PROCEDURE — 85025 COMPLETE CBC W/AUTO DIFF WBC: CPT | Performed by: EMERGENCY MEDICINE

## 2024-07-10 RX ORDER — POTASSIUM CHLORIDE 20 MEQ/1
40 TABLET, EXTENDED RELEASE ORAL ONCE
Status: COMPLETED | OUTPATIENT
Start: 2024-07-10 | End: 2024-07-10

## 2024-07-10 RX ORDER — LORAZEPAM 1 MG/1
0.5 TABLET ORAL ONCE
Status: COMPLETED | OUTPATIENT
Start: 2024-07-10 | End: 2024-07-10

## 2024-07-10 RX ADMIN — POTASSIUM CHLORIDE 40 MEQ: 1500 TABLET, EXTENDED RELEASE ORAL at 19:17

## 2024-07-10 RX ADMIN — LORAZEPAM 0.5 MG: 1 TABLET ORAL at 18:57

## 2024-07-11 VITALS
HEIGHT: 65 IN | SYSTOLIC BLOOD PRESSURE: 142 MMHG | OXYGEN SATURATION: 95 % | RESPIRATION RATE: 18 BRPM | TEMPERATURE: 98 F | BODY MASS INDEX: 25.83 KG/M2 | HEART RATE: 98 BPM | DIASTOLIC BLOOD PRESSURE: 92 MMHG | WEIGHT: 155 LBS

## 2024-07-11 LAB
EXT PREGNANCY TEST URINE: NEGATIVE
EXT. CONTROL: NORMAL

## 2024-07-11 PROCEDURE — 99285 EMERGENCY DEPT VISIT HI MDM: CPT | Performed by: EMERGENCY MEDICINE

## 2024-07-11 PROCEDURE — 81025 URINE PREGNANCY TEST: CPT | Performed by: EMERGENCY MEDICINE

## 2024-07-11 RX ORDER — ATORVASTATIN CALCIUM 10 MG/1
10 TABLET, FILM COATED ORAL
Status: DISCONTINUED | OUTPATIENT
Start: 2024-07-11 | End: 2024-07-11 | Stop reason: HOSPADM

## 2024-07-11 RX ORDER — ALBUTEROL SULFATE 90 UG/1
2 AEROSOL, METERED RESPIRATORY (INHALATION) EVERY 4 HOURS PRN
Status: DISCONTINUED | OUTPATIENT
Start: 2024-07-11 | End: 2024-07-11 | Stop reason: HOSPADM

## 2024-07-11 RX ORDER — CLOZAPINE 100 MG/1
350 TABLET ORAL
Status: DISCONTINUED | OUTPATIENT
Start: 2024-07-11 | End: 2024-07-11 | Stop reason: HOSPADM

## 2024-07-11 RX ORDER — LORAZEPAM 1 MG/1
0.5 TABLET ORAL 2 TIMES DAILY
Status: DISCONTINUED | OUTPATIENT
Start: 2024-07-11 | End: 2024-07-11 | Stop reason: HOSPADM

## 2024-07-11 RX ORDER — FOLIC ACID 1 MG/1
1 TABLET ORAL DAILY
Status: DISCONTINUED | OUTPATIENT
Start: 2024-07-11 | End: 2024-07-11 | Stop reason: HOSPADM

## 2024-07-11 RX ORDER — GUAIFENESIN 600 MG/1
1200 TABLET, EXTENDED RELEASE ORAL EVERY 12 HOURS SCHEDULED
Status: DISCONTINUED | OUTPATIENT
Start: 2024-07-11 | End: 2024-07-11 | Stop reason: HOSPADM

## 2024-07-11 RX ORDER — LAMOTRIGINE 100 MG/1
100 TABLET ORAL 2 TIMES DAILY
Status: DISCONTINUED | OUTPATIENT
Start: 2024-07-11 | End: 2024-07-11 | Stop reason: HOSPADM

## 2024-07-11 RX ORDER — BENZONATATE 100 MG/1
100 CAPSULE ORAL 3 TIMES DAILY
Status: DISCONTINUED | OUTPATIENT
Start: 2024-07-11 | End: 2024-07-11 | Stop reason: HOSPADM

## 2024-07-11 RX ORDER — FLUTICASONE PROPIONATE 50 MCG
1 SPRAY, SUSPENSION (ML) NASAL DAILY
Status: DISCONTINUED | OUTPATIENT
Start: 2024-07-11 | End: 2024-07-11 | Stop reason: HOSPADM

## 2024-07-11 RX ORDER — FLUTICASONE FUROATE AND VILANTEROL 100; 25 UG/1; UG/1
1 POWDER RESPIRATORY (INHALATION)
Status: DISCONTINUED | OUTPATIENT
Start: 2024-07-11 | End: 2024-07-11 | Stop reason: HOSPADM

## 2024-07-11 RX ORDER — LORAZEPAM 1 MG/1
0.5 TABLET ORAL ONCE
Status: COMPLETED | OUTPATIENT
Start: 2024-07-11 | End: 2024-07-11

## 2024-07-11 RX ORDER — CLOZAPINE 25 MG/1
50 TABLET ORAL DAILY
Status: DISCONTINUED | OUTPATIENT
Start: 2024-07-11 | End: 2024-07-11 | Stop reason: HOSPADM

## 2024-07-11 RX ADMIN — LORAZEPAM 0.5 MG: 1 TABLET ORAL at 04:44

## 2024-07-11 NOTE — ED NOTES
Patient not wanting any of her daily medication as she reports she will take at home.     Yelena Morley RN  07/11/24 0869

## 2024-07-11 NOTE — ED NOTES
Pt requested to speak to provider in regards to signing out. Provider explained to pt it would be in her best interest to stay and speak to crisis in the morning. Pt agreeable. Asked for something to help sleep. Medication administered. Blankets given for comfort. Pt currently resting comfortably.      Anuja Llanos RN  07/11/24 1854

## 2024-07-11 NOTE — ED CARE HANDOFF
Emergency Department Sign Out Note        Sign out and transfer of care from Dr. Galarza. See Separate Emergency Department note.     The patient, Jo-Ann Lamb, was evaluated by the previous provider for Psych.    Workup Completed:  Baseline eval    ED Course / Workup Pending (followup):  Crisis evaluation                                  ED Course as of 07/11/24 0711   Thu Jul 11, 2024   0110 SI with plan, drink to death.   Pending crisis evaluation.   Cannot leave.      Procedures  Medical Decision Making  Amount and/or Complexity of Data Reviewed  Labs: ordered.    Risk  Prescription drug management.  Decision regarding hospitalization.            Disposition  Final diagnoses:   Suicidal ideations   Depression     Time reflects when diagnosis was documented in both MDM as applicable and the Disposition within this note       Time User Action Codes Description Comment    7/10/2024  6:45 PM Andrew Galarza Add [R45.851] Suicidal ideations     7/10/2024  6:45 PM Andrew Galarza Add [F32.A] Depression           ED Disposition       ED Disposition   Transfer to Behavioral Health Condition   --    Date/Time   Wed Jul 10, 2024  6:45 PM    Comment   Jo-Ann Lamb should be transferred out to [facility] and has been medically cleared.               Follow-up Information    None       Patient's Medications   Discharge Prescriptions    No medications on file     No discharge procedures on file.       ED Provider  Electronically Signed by     Dion Moss DO  07/11/24 0711

## 2024-07-11 NOTE — ED NOTES
This writer discussed the patients current presentation and recommended discharge plan with .  They agree with the patient being discharged at this time.     The patient was Instructed to follow up with their [facility] team.      This writer and the patient completed a safety plan.  The patient was provided with a copy of their safety plan with encouragement to utilize the plan following discharge.     In addition, the patient was instructed to call local [facility] crisis, other crisis services, 911 or to go to the nearest ER immediately if their situation changes at any time.     This writer discussed discharge plans with the patient who agrees with and understands the discharge plans.         SAFETY PLAN  Warning Signs (thoughts, images, mood, behavior, situations) of a potential crisis: feelings of loneliness, drinking alcohol      Coping Skills (what can I do to take my mind off the problem, or to keep myself safe): garden, grill      Outside Support (who can I reach out to for support and help): multiple family members and friends, ACT team        National Suicide Prevention Hotline:  [address] [place] [address] [phone] - Crisis   [facility] [phone] - LVF Crisis/Mobile Crisis   [phone] - SLPF Crisis   [facility]: [phone]  [facility]: [phone]   [facility] [phone] - Crisis   [facility] [phone] - Crisis     [facility] [phone] - Crisis   [facility] [phone] - Crisis   [phone] - Peer Support Talk Line (1-9pm daily)  [phone] - Teen Support Talk Line (1-9pm daily)  [phone] - [facility] [phone]- Crisis    [facility] [phone] - Crisis   [facility] [phone] - Crisis    [facility]) [phone] - [facility] [place] Crisis

## 2024-07-11 NOTE — ED NOTES
"Patient denies any thoughts of SI or HI.   Patient requesting to speak to Crisis. Patient made aware that crisis is currently not on site.  Patient requesting to go home as she \"just had a bad night\" and believes her neighbors are breaking into her house.    Medication reviewed with patient, MD alerted to order daily medication.  Patient remains calm & cooperative.     Yelena Morley RN  07/11/24 0741    "

## 2024-07-11 NOTE — ED CARE HANDOFF
"Emergency Department Sign Out Note        Sign out and transfer of care from Dr. Mustafa. See Separate Emergency Department note.     The patient, Jo-Ann Lamb, was evaluated by the previous provider for alcohol intoxication.    Workup Completed:  Patient noted that she wanted to \"drink herself to death.\"  BAL in the emergency department was 80.  Patient had a CMP, TSH, rapid drug screen, ethanol, CBC, and UA.    ED Course / Workup Pending (followup):  Patient was medically cleared prior to my evaluation.    Patient evaluated by crisis and is now sober, and realizes that she made a mistake by drinking, and notes that she came to the hospital as a safety measure.  The patient does not want to die and would rather follow-up with outpatient services.  Patient to be discharged.                                     Procedures  Medical Decision Making  Amount and/or Complexity of Data Reviewed  Labs: ordered.    Risk  OTC drugs.  Prescription drug management.  Decision regarding hospitalization.            Disposition  Final diagnoses:   Suicidal ideations   Depression   Alcohol abuse     Time reflects when diagnosis was documented in both MDM as applicable and the Disposition within this note       Time User Action Codes Description Comment    7/10/2024  6:45 PM Andrew Galarza Add [R45.851] Suicidal ideations     7/10/2024  6:45 PM Andrew Galarza Add [F32.A] Depression     7/11/2024  8:19 AM Iglesia Dangelo Add [F10.10] Alcohol abuse           ED Disposition       ED Disposition   Discharge    Condition   Stable    Date/Time   Thu Jul 11, 2024  8:29 AM    Comment   Jo-Ann Lamb should be transferred out to D and has been medically cleared.               MD Documentation      Flowsheet Row Most Recent Value   Sending MD Dr. Dangelo          Follow-up Information       Follow up With Specialties Details Why Contact Info    Cipriano Lew, DO Family Medicine On 7/17/2024  [address] " [id number]  [phone]            Discharge Medication List as of 7/11/2024  8:29 AM        CONTINUE these medications which have NOT CHANGED    Details   albuterol (PROVENTIL HFA,VENTOLIN HFA) 90 mcg/act inhaler Inhale 2 puffs every 4 (four) hours as needed for wheezing, Starting Mon 7/1/2024, Normal      atorvastatin (LIPITOR) 10 mg tablet Take 1 tablet (10 mg total) by mouth daily, Starting Mon 7/1/2024, Normal      benzonatate (TESSALON PERLES) 100 mg capsule Take 1 capsule (100 mg total) by mouth 3 (three) times a day, Starting Tue 4/9/2024, Normal      Cholecalciferol (VITAMIN D3) 1,000 units tablet Take 1 tablet (1,000 Units total) by mouth every morning Dx: Supplement, Starting Mon 7/1/2024, Normal      !! clozapine (CLOZARIL) 50 MG tablet Take 1 tablet (50 mg total) by mouth in the morning for 14 days, Starting Mon 7/1/2024, Until Mon 7/15/2024, Normal      !! clozapine (CLOZARIL) 50 MG tablet TAKE 7 TABLETS (350 MG TOTAL) BY MOUTH DAILY AT BEDTIME FOR 14 DAYS, Starting Wed 7/3/2024, Until Wed 7/17/2024, Normal      fluticasone (FLONASE) 50 mcg/act nasal spray 1 spray into each nostril daily, Starting Mon 7/1/2024, Normal      fluticasone-salmeterol (Advair HFA) 115-21 MCG/ACT inhaler Inhale 2 puffs 2 (two) times a day Rinse mouth after use., Starting Mon 7/1/2024, Normal      folic acid (FOLVITE) 1 mg tablet Take 1 tablet (1 mg total) by mouth daily, Starting Mon 7/1/2024, Normal      Guaifenesin 1200 MG TB12 Take 1 tablet (1,200 mg total) by mouth every 12 (twelve) hours, Starting Mon 7/1/2024, Normal      hydrocortisone (ANUSOL-HC) 2.5 % rectal cream Apply topically 4 (four) times a day as needed for hemorrhoids, Starting Mon 7/1/2024, Normal      Incontinence Supply Disposable (Depend Underwear Sm/Med) MISC Use 3 (three) times a day as needed (incontinence), Starting Wed 3/15/2023, Until Wed 8/16/2023 at 2359, Print      lamoTRIgine (LaMICtal) 100 mg tablet Take 1 tablet (100 mg total) by mouth 2 (two)  times a day for 14 days, Starting Mon 7/1/2024, Until Mon 7/15/2024, Normal      LORazepam (ATIVAN) 0.5 mg tablet Take 1 tablet (0.5 mg total) by mouth 2 (two) times a day for 15 days, Starting Mon 7/1/2024, Until Tue 7/16/2024, Normal      melatonin 3 mg Take 1 tablet (3 mg total) by mouth daily at bedtime, Starting Mon 7/1/2024, Normal      meloxicam (MOBIC) 15 mg tablet Take 1 tablet (15 mg total) by mouth daily, Starting Mon 7/1/2024, Normal      methocarbamol (ROBAXIN) 500 mg tablet Take 1 tablet (500 mg total) by mouth 2 (two) times a day, Starting Mon 7/1/2024, Normal      nicotine (NICODERM CQ) 14 mg/24hr TD 24 hr patch Place 1 patch on the skin over 24 hours daily, Starting Tue 7/2/2024, Normal      nicotine polacrilex (NICORETTE) 4 mg gum Chew 1 each (4 mg total) every 2 (two) hours as needed for smoking cessation, Starting Mon 7/1/2024, Normal      pantoprazole (PROTONIX) 40 mg tablet Take 1 tablet (40 mg total) by mouth daily before breakfast, Starting Mon 7/1/2024, Until Wed 7/31/2024, Normal      phenol (CHLORASEPTIC) 1.4 % mucosal liquid Apply 1 spray to the mouth or throat every 2 (two) hours as needed (Throat and mouth pain), Starting Mon 7/1/2024, Normal      polyethylene glycol (GLYCOLAX) 17 GM/SCOOP powder Take 17 g by mouth daily, Starting Mon 7/1/2024, Normal      risperiDONE (RisperDAL) 0.5 mg tablet TAKE 1 TABLET BY MOUTH THREE TIMES A DAY, Starting Mon 7/1/2024, Normal      senna-docusate sodium (SENOKOT S) 8.6-50 mg per tablet Take 1 tablet by mouth 2 (two) times a day, Starting Mon 7/1/2024, Until Wed 7/31/2024, Normal      simethicone (MYLICON) 80 mg chewable tablet Chew 1 tablet (80 mg total) 4 (four) times a day (with meals and at bedtime), Starting Mon 7/1/2024, Normal      thiamine 100 MG tablet Take 1 tablet (100 mg total) by mouth daily, Starting Mon 7/1/2024, Normal       !! - Potential duplicate medications found. Please discuss with provider.        No discharge procedures on  file.       ED Provider  Electronically Signed by     Iglesia Dangelo Jr., DO  07/12/24 0910

## 2024-07-11 NOTE — ED NOTES
The patient contacted police last night due to having fleeting thoughts of suicidal ideation without a plan. The patient reported that she was feeling sad yesterday morning and drank in order to cope with the depression. Her boyfriend, who lives with his father, is unable to see the patient as much as she would like. Her boyfriend's father recently got a new car and is not allowing her boyfriend to drive to the patient as often. The patient began thinking that her boyfriend was going to break up with her, and she relapsed on alcohol after not drinking for a few months. The patient became frustrated at herself for relapsing and began having fleeting suicidal ideations as a result. The patient presented to the emergency department requesting admission last night. However, this morning, the patient requested to be discharged. Today she stated that she was in a better frame of mind. She denied suicidal thoughts, stating that she would never kill herself 'over a man'. The patient has multiple supports, including an ACT team and family members and friends. The patient has a history of one suicide attempt years ago when she overdosed on pills. She has been hospitalized recently, being discharged from the behavioral health unit 9 days ago. The patient denied suicidal ideations. No psychosis was present.

## 2024-07-11 NOTE — DISCHARGE INSTRUCTIONS
Instructions per behavioral health.    Return to the ER for any new, concerning, worsening issues.    Follow-up with your family doctor in less than a week for an evaluation.

## 2024-07-11 NOTE — ED NOTES
Patient given cell phone at this time to call for transport home.     Yelena Morley RN  07/11/24 2402

## 2024-07-11 NOTE — ED NOTES
Pt requested a new set of scrubs, a coffee, and orange juice. Provided requested items.      Anuja Llanos RN  07/11/24 9683
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
## 2024-07-26 PROCEDURE — GZHZZZZ GROUP PSYCHOTHERAPY: ICD-10-PCS | Performed by: PSYCHIATRY & NEUROLOGY

## 2024-07-26 PROCEDURE — GZ59ZZZ INDIVIDUAL PSYCHOTHERAPY, PSYCHOPHYSIOLOGICAL: ICD-10-PCS | Performed by: PSYCHIATRY & NEUROLOGY

## 2024-08-15 ENCOUNTER — APPOINTMENT (OUTPATIENT)
Dept: LAB | Facility: CLINIC | Age: 54
End: 2024-08-15
Payer: COMMERCIAL

## 2024-08-15 DIAGNOSIS — F20.0 PARANOID SCHIZOPHRENIA, SUBCHRONIC CONDITION (HCC): ICD-10-CM

## 2024-08-16 ENCOUNTER — OFFICE VISIT (OUTPATIENT)
Dept: URGENT CARE | Facility: CLINIC | Age: 54
End: 2024-08-16
Payer: COMMERCIAL

## 2024-08-16 VITALS
HEART RATE: 107 BPM | OXYGEN SATURATION: 94 % | SYSTOLIC BLOOD PRESSURE: 122 MMHG | BODY MASS INDEX: 29.45 KG/M2 | HEIGHT: 60 IN | DIASTOLIC BLOOD PRESSURE: 75 MMHG | WEIGHT: 150 LBS | RESPIRATION RATE: 20 BRPM | TEMPERATURE: 98.3 F

## 2024-08-16 DIAGNOSIS — R10.12 LEFT UPPER QUADRANT ABDOMINAL PAIN: Primary | ICD-10-CM

## 2024-08-16 DIAGNOSIS — R19.5 DARK STOOLS: ICD-10-CM

## 2024-08-16 PROCEDURE — 99213 OFFICE O/P EST LOW 20 MIN: CPT | Performed by: PHYSICIAN ASSISTANT

## 2024-08-16 RX ORDER — UBIDECARENONE 100 MG
CAPSULE ORAL
COMMUNITY
Start: 2024-07-01

## 2024-08-16 RX ORDER — VENLAFAXINE HYDROCHLORIDE 75 MG/1
CAPSULE, EXTENDED RELEASE ORAL
COMMUNITY
Start: 2024-06-20

## 2024-08-16 RX ORDER — CLOZAPINE 100 MG/1
TABLET ORAL
COMMUNITY
Start: 2024-07-24

## 2024-08-16 RX ORDER — RISPERIDONE 1 MG/1
TABLET ORAL
COMMUNITY
Start: 2024-07-27
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
## 2024-08-16 NOTE — PATIENT INSTRUCTIONS
Recommend patient be seen and evaluated further at [facility] ED for persistent abdominal pain and recent dark-colored stools.  All patient's questions answered and is agreeable with this plan.  Patient is refusing transfer via EMS and will be transported by a friend.

## 2024-08-21 ENCOUNTER — OFFICE VISIT (OUTPATIENT)
Dept: FAMILY MEDICINE CLINIC | Facility: CLINIC | Age: 54
End: 2024-08-21
Payer: COMMERCIAL

## 2024-08-21 VITALS
TEMPERATURE: 97 F | HEIGHT: 60 IN | DIASTOLIC BLOOD PRESSURE: 84 MMHG | HEART RATE: 76 BPM | RESPIRATION RATE: 16 BRPM | BODY MASS INDEX: 28.86 KG/M2 | WEIGHT: 147 LBS | SYSTOLIC BLOOD PRESSURE: 126 MMHG

## 2024-08-21 DIAGNOSIS — R10.13 EPIGASTRIC PAIN: ICD-10-CM

## 2024-08-21 DIAGNOSIS — R19.5 DARK STOOLS: ICD-10-CM

## 2024-08-21 DIAGNOSIS — J45.30 MILD PERSISTENT ASTHMA WITHOUT COMPLICATION: ICD-10-CM

## 2024-08-21 DIAGNOSIS — K64.9 HEMORRHOIDS, UNSPECIFIED HEMORRHOID TYPE: ICD-10-CM

## 2024-08-21 DIAGNOSIS — J40 BRONCHITIS: Primary | ICD-10-CM

## 2024-08-21 DIAGNOSIS — E55.9 VITAMIN D DEFICIENCY: ICD-10-CM

## 2024-08-21 DIAGNOSIS — J43.9 PULMONARY EMPHYSEMA, UNSPECIFIED EMPHYSEMA TYPE (HCC): ICD-10-CM

## 2024-08-21 DIAGNOSIS — K59.04 CHRONIC IDIOPATHIC CONSTIPATION: ICD-10-CM

## 2024-08-21 DIAGNOSIS — K21.9 GASTROESOPHAGEAL REFLUX DISEASE WITHOUT ESOPHAGITIS: ICD-10-CM

## 2024-08-21 PROCEDURE — 99214 OFFICE O/P EST MOD 30 MIN: CPT | Performed by: FAMILY MEDICINE

## 2024-08-21 RX ORDER — DEXTROMETHORPHAN HYDROBROMIDE AND PROMETHAZINE HYDROCHLORIDE 15; 6.25 MG/5ML; MG/5ML
5 SYRUP ORAL 4 TIMES DAILY PRN
Qty: 180 ML | Refills: 0 | Status: SHIPPED | OUTPATIENT
Start: 2024-08-21

## 2024-08-21 RX ORDER — METHYLPREDNISOLONE 4 MG
TABLET, DOSE PACK ORAL
Qty: 21 EACH | Refills: 0 | Status: SHIPPED | OUTPATIENT
Start: 2024-08-21

## 2024-08-21 RX ORDER — SUCRALFATE 1 G/1
1 TABLET ORAL 4 TIMES DAILY
Qty: 120 TABLET | Refills: 1 | Status: SHIPPED | OUTPATIENT
Start: 2024-08-21

## 2024-08-21 RX ORDER — PANTOPRAZOLE SODIUM 40 MG/1
40 TABLET, DELAYED RELEASE ORAL
Qty: 30 TABLET | Refills: 0 | Status: SHIPPED | OUTPATIENT
Start: 2024-08-21 | End: 2024-09-20

## 2024-08-21 RX ORDER — AZITHROMYCIN 250 MG/1
TABLET, FILM COATED ORAL
Qty: 6 TABLET | Refills: 0 | Status: SHIPPED | OUTPATIENT
Start: 2024-08-21 | End: 2024-08-26
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
## 2024-08-27 NOTE — PROGRESS NOTES
04/14/21 0700   Activity/Group Checklist   Group   (Coffee Talk )   Attendance Attended   Attendance Duration (min) 46-60   Interactions Interacted appropriately   Affect/Mood Appropriate;Calm;Normal range   Goals Achieved Identified feelings; Able to listen to others; Able to engage in interactions Jagdish is a 73-year-old  in  who is referred by Dr. David Foster.    He has had some balance issues over the last year or so.  They have not worsened.  He relates some of this to a arthritic condition in his left great toe.    He has not experienced any significant axial neck pain nor severe cervical radicular pain.    He does recall an episode in his 30s where he had severe axial neck pain and cervical radiculopathy but that did improve.  He also recalls taking a lot of aspirin at the time that led to a peptic ulcer.    He is a type II diabetic.  Otherwise his general health is stable.    Family, social, and medical histories are obtained and reviewed.    30-point, patient-recorded Review of Systems is personally obtained and reviewed. Inclusive is no history of weight loss, change in appetite, recent change in activity level, change in bowel or bladder habits, fevers, chills, malaise, or night pain.    Healthy-appearing patient no acute distress.  Stable gait. Tandem ambulation without difficulty. Painless motion cervical spine. Negative Lhermitte's. Strength is intact both upper and lower extremities. Sensation intact. No hyporeflexia upper or lower extremities.    His cervical MRI shows multilevel spondylitic changes, most notable at C3-4, C4-5 and C5-6, where there are disc and osteophyte complexes at each level.  The result is canal stenosis although no high-grade spinal cord compression.    Impression: This man does have cervical spondylosis with canal stenosis but no spinal cord compression and no severe myelopathic features.  We have discussed the nature of this.  We talked about the different approaches of considering prophylactic surgery versus a watch and wait approach.  In the absence of any severe myelopathic features, I think observation is appropriate.  I have educated him on things to look for.  He has a good understanding of this and he will keep me updated on his  progress.    ** Dictated with voice recognition software and not immediately reviewed for errors in grammar and/or spelling **

## 2024-08-31 ENCOUNTER — HOSPITAL ENCOUNTER (EMERGENCY)
Facility: HOSPITAL | Age: 54
Discharge: HOME | End: 2024-08-31
Attending: EMERGENCY MEDICINE
Payer: COMMERCIAL

## 2024-08-31 VITALS
SYSTOLIC BLOOD PRESSURE: 105 MMHG | RESPIRATION RATE: 18 BRPM | TEMPERATURE: 97.7 F | DIASTOLIC BLOOD PRESSURE: 79 MMHG | HEART RATE: 100 BPM | OXYGEN SATURATION: 100 %

## 2024-08-31 DIAGNOSIS — Z00.8 ENCOUNTER FOR PSYCHOLOGICAL EVALUATION: ICD-10-CM

## 2024-08-31 DIAGNOSIS — F25.0 SCHIZOAFFECTIVE DISORDER, BIPOLAR TYPE (HCC): Primary | Chronic | ICD-10-CM

## 2024-08-31 LAB
ALBUMIN SERPL BCG-MCNC: 4.1 G/DL (ref 3.5–5)
ALP SERPL-CCNC: 86 U/L (ref 34–104)
ALT SERPL W P-5'-P-CCNC: 15 U/L (ref 7–52)
AMPHETAMINES SERPL QL SCN: POSITIVE
ANION GAP SERPL CALCULATED.3IONS-SCNC: 11 MMOL/L (ref 4–13)
APAP SERPL-MCNC: <2 UG/ML (ref 10–20)
AST SERPL W P-5'-P-CCNC: 13 U/L (ref 13–39)
ATRIAL RATE: 99 BPM
BARBITURATES UR QL: NEGATIVE
BASOPHILS # BLD AUTO: 0.04 THOUSANDS/ÂΜL (ref 0–0.1)
BASOPHILS NFR BLD AUTO: 0 % (ref 0–1)
BENZODIAZ UR QL: NEGATIVE
BILIRUB SERPL-MCNC: 0.2 MG/DL (ref 0.2–1)
BILIRUB UR QL STRIP: NEGATIVE
BUN SERPL-MCNC: 4 MG/DL (ref 5–25)
CALCIUM SERPL-MCNC: 9.4 MG/DL (ref 8.4–10.2)
CHLORIDE SERPL-SCNC: 106 MMOL/L (ref 96–108)
CLARITY UR: CLEAR
CO2 SERPL-SCNC: 23 MMOL/L (ref 21–32)
COCAINE UR QL: NEGATIVE
COLOR UR: YELLOW
CREAT SERPL-MCNC: 0.5 MG/DL (ref 0.6–1.3)
EOSINOPHIL # BLD AUTO: 0.38 THOUSAND/ÂΜL (ref 0–0.61)
EOSINOPHIL NFR BLD AUTO: 4 % (ref 0–6)
ERYTHROCYTE [DISTWIDTH] IN BLOOD BY AUTOMATED COUNT: 13.5 % (ref 11.6–15.1)
ETHANOL EXG-MCNC: 0.05 MG/DL
ETHANOL EXG-MCNC: 0.1 MG/DL
ETHANOL EXG-MCNC: 0.15 MG/DL
ETHANOL SERPL-MCNC: 134 MG/DL
EXT PREGNANCY TEST URINE: NEGATIVE
EXT. CONTROL: NORMAL
FENTANYL UR QL SCN: NEGATIVE
GFR SERPL CREATININE-BSD FRML MDRD: 110 ML/MIN/1.73SQ M
GLUCOSE SERPL-MCNC: 96 MG/DL (ref 65–140)
GLUCOSE UR STRIP-MCNC: NEGATIVE MG/DL
HCT VFR BLD AUTO: 42 % (ref 34.8–46.1)
HGB BLD-MCNC: 13.7 G/DL (ref 11.5–15.4)
HGB UR QL STRIP.AUTO: NEGATIVE
HYDROCODONE UR QL SCN: NEGATIVE
IMM GRANULOCYTES # BLD AUTO: 0.07 THOUSAND/UL (ref 0–0.2)
IMM GRANULOCYTES NFR BLD AUTO: 1 % (ref 0–2)
KETONES UR STRIP-MCNC: NEGATIVE MG/DL
LEUKOCYTE ESTERASE UR QL STRIP: NEGATIVE
LYMPHOCYTES # BLD AUTO: 2.24 THOUSANDS/ÂΜL (ref 0.6–4.47)
LYMPHOCYTES NFR BLD AUTO: 23 % (ref 14–44)
MCH RBC QN AUTO: 29.7 PG (ref 26.8–34.3)
MCHC RBC AUTO-ENTMCNC: 32.6 G/DL (ref 31.4–37.4)
MCV RBC AUTO: 91 FL (ref 82–98)
METHADONE UR QL: NEGATIVE
MONOCYTES # BLD AUTO: 0.49 THOUSAND/ÂΜL (ref 0.17–1.22)
MONOCYTES NFR BLD AUTO: 5 % (ref 4–12)
NEUTROPHILS # BLD AUTO: 6.54 THOUSANDS/ÂΜL (ref 1.85–7.62)
NEUTS SEG NFR BLD AUTO: 67 % (ref 43–75)
NITRITE UR QL STRIP: NEGATIVE
NRBC BLD AUTO-RTO: 0 /100 WBCS
OPIATES UR QL SCN: NEGATIVE
OXYCODONE+OXYMORPHONE UR QL SCN: NEGATIVE
P AXIS: 69 DEGREES
PCP UR QL: NEGATIVE
PH UR STRIP.AUTO: 6.5 [PH]
PLATELET # BLD AUTO: 250 THOUSANDS/UL (ref 149–390)
PMV BLD AUTO: 8.9 FL (ref 8.9–12.7)
POTASSIUM SERPL-SCNC: 3.6 MMOL/L (ref 3.5–5.3)
PR INTERVAL: 150 MS
PROT SERPL-MCNC: 6.3 G/DL (ref 6.4–8.4)
PROT UR STRIP-MCNC: NEGATIVE MG/DL
QRS AXIS: 44 DEGREES
QRSD INTERVAL: 76 MS
QT INTERVAL: 388 MS
QTC INTERVAL: 497 MS
RBC # BLD AUTO: 4.61 MILLION/UL (ref 3.81–5.12)
SALICYLATES SERPL-MCNC: <5 MG/DL (ref 3–20)
SODIUM SERPL-SCNC: 140 MMOL/L (ref 135–147)
SP GR UR STRIP.AUTO: <=1.005
T WAVE AXIS: 38 DEGREES
THC UR QL: NEGATIVE
TSH SERPL DL<=0.05 MIU/L-ACNC: 1.28 UIU/ML (ref 0.45–4.5)
UROBILINOGEN UR QL STRIP.AUTO: 0.2 E.U./DL
VENTRICULAR RATE: 99 BPM
WBC # BLD AUTO: 9.76 THOUSAND/UL (ref 4.31–10.16)

## 2024-08-31 PROCEDURE — 80053 COMPREHEN METABOLIC PANEL: CPT | Performed by: EMERGENCY MEDICINE

## 2024-08-31 PROCEDURE — 80307 DRUG TEST PRSMV CHEM ANLYZR: CPT

## 2024-08-31 PROCEDURE — 82075 ASSAY OF BREATH ETHANOL: CPT | Performed by: EMERGENCY MEDICINE

## 2024-08-31 PROCEDURE — 36415 COLL VENOUS BLD VENIPUNCTURE: CPT | Performed by: EMERGENCY MEDICINE

## 2024-08-31 PROCEDURE — 99285 EMERGENCY DEPT VISIT HI MDM: CPT

## 2024-08-31 PROCEDURE — 82075 ASSAY OF BREATH ETHANOL: CPT

## 2024-08-31 PROCEDURE — 99245 OFF/OP CONSLTJ NEW/EST HI 55: CPT | Performed by: GENERAL PRACTICE

## 2024-08-31 PROCEDURE — 84443 ASSAY THYROID STIM HORMONE: CPT | Performed by: EMERGENCY MEDICINE

## 2024-08-31 PROCEDURE — 82077 ASSAY SPEC XCP UR&BREATH IA: CPT | Performed by: EMERGENCY MEDICINE

## 2024-08-31 PROCEDURE — 93005 ELECTROCARDIOGRAM TRACING: CPT

## 2024-08-31 PROCEDURE — 81025 URINE PREGNANCY TEST: CPT | Performed by: EMERGENCY MEDICINE

## 2024-08-31 PROCEDURE — 80179 DRUG ASSAY SALICYLATE: CPT | Performed by: EMERGENCY MEDICINE

## 2024-08-31 PROCEDURE — 81003 URINALYSIS AUTO W/O SCOPE: CPT | Performed by: EMERGENCY MEDICINE

## 2024-08-31 PROCEDURE — 85025 COMPLETE CBC W/AUTO DIFF WBC: CPT | Performed by: EMERGENCY MEDICINE

## 2024-08-31 PROCEDURE — 99284 EMERGENCY DEPT VISIT MOD MDM: CPT | Performed by: EMERGENCY MEDICINE

## 2024-08-31 PROCEDURE — 80143 DRUG ASSAY ACETAMINOPHEN: CPT | Performed by: EMERGENCY MEDICINE

## 2024-08-31 PROCEDURE — 93010 ELECTROCARDIOGRAM REPORT: CPT | Performed by: INTERNAL MEDICINE

## 2024-08-31 RX ORDER — LORAZEPAM 1 MG/1
1 TABLET ORAL ONCE
Status: COMPLETED | OUTPATIENT
Start: 2024-08-31 | End: 2024-08-31

## 2024-08-31 RX ADMIN — LORAZEPAM 1 MG: 1 TABLET ORAL at 15:07

## 2024-08-31 NOTE — ED NOTES
Patient asked [facility] how long this process is going to take as she wants to go home. [facility] informed patient of need for alcohol level to come down before she can be assessed.   Patient was provided with water.
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
## 2024-08-31 NOTE — ED NOTES
8/31/2024 @ 1826:    Hany called from [facility] and informed Crisis that Dr. Burden is assigned to the patient's case.  He will contact this writer when ready to initiate assessment.

## 2024-08-31 NOTE — DISCHARGE INSTRUCTIONS
If you develop any new or worsening symptoms please immediately return to your nearest emergency department.    The patient declined to complete a safety plan.    In addition, the patient was instructed to call local [facility] crisis, other crisis services, 911 or to go to the nearest ER immediately if their situation changes at any time.     Discussion was held with Act , [place], regarding the process of completing a 302 petition in their county if necessary.     This writer discussed discharge plans with the patient's on call ACT , [place].     National Suicide Prevention Hotline:  [address] [place] [address] [phone] - Crisis   [facility] [phone] - [facility]F Crisis/Mobile Crisis   [phone] - [facility] Crisis   [facility]: [phone]  [facility]: [phone]   [facility] [phone] - Crisis   [facility] [phone] - Crisis     [facility] [phone] - Crisis   [facility] [phone] - Crisis   [phone] - Peer Support Talk Line (1-9pm daily)  [phone] - Teen Support Talk Line (1-9pm daily)  [phone] - [facility]   [facility] [phone]- Crisis    [facility] [phone] - Crisis   [facility] [phone] - Crisis    [facility]) [phone] - [facility]

## 2024-08-31 NOTE — ED NOTES
Patient reported to ed provider she Tried to attempt to kill herself 3 days ago by drinking, taking pills, and snorting meth     Nafisa Christianson RN  08/31/24 0800
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
## 2024-08-31 NOTE — ED NOTES
"8/31/2024 @ 4464:    Crisis contacted [facility] at [phone] and received the call center.      ACT  ON CALL Edna contacted this writer.     Edna reports that the patient's presenting behaviors have been her \"baseline for the past could of weeks.\"  Edna expressed concern for her safety earlier as the patient called her twice and admitted to drinking, was hysterical , and thought people were entering her apartment.  Edna also reports that the patient has \"not been doing that good since her last admission in June\"  and that the drinking intensifies her symptoms. Edna expressed that \"she's going to go home and start using again.\" Edna explained her concerns medically as she is worried for the patients safety when she drinks while on her prescribed medications. Edna stated that the patient is residing in a semi-independent living facility, so they can't monitor or restrict what she purchases.    Crisis provided Edna with an update and review of next steps.  Edna requested an update once the patient is seen by psychiatry.  Edna can be reached at [phone].  "

## 2024-08-31 NOTE — ED NOTES
"Patient asked this writer for a pregnancy test. When asked for the need, patient states \"to see if I'm pregnant\". This writer then handed the patient a urine cup. Specimen tested and result was negative. Patient notified about result.      Franck Farooq  08/31/24 1903    "

## 2024-08-31 NOTE — ED NOTES
8/31/2024 @ 1715:    Crisis spoke to MD Doin Moss to review presenting factors.  Due to conflicting information reported by the patient, a psychiatric consult will be placed for disposition support, and safety precautions/concerns.     Crisis called [facility] and spoke to Dagmar.  Consult logged and added to the que.

## 2024-08-31 NOTE — ED NOTES
"8/31/2024 @ 8/31/2024:    Patient knocked on door and requested a pregnancy test.  Tech inquired on need and the patient stated \"to see if I'm pregnant.\"  Tech provided patient with urine specimen cup.   "

## 2024-08-31 NOTE — ED NOTES
8/31/2024 @ 1946:    The patient declined to complete a safety plan.    In addition, the patient was instructed to call local [facility] crisis, other crisis services, 911 or to go to the nearest ER immediately if their situation changes at any time.     Discussion was held with Act , Edna, regarding the process of completing a 302 petition in their county if necessary.     This writer discussed discharge plans with the patient's on call ACT , Edna.       Learned Suicide Prevention Hotline:  [address] [place] [address] [phone] - Crisis   [facility] [phone] - LVF Crisis/Mobile Crisis   [phone] - SLPF Crisis   [facility]: [phone]  [facility]: [phone]   [facility] [phone] - Crisis   [facility] [phone] - Crisis     [facility] [phone] - Crisis   [facility] [phone] - Crisis   [phone] - Peer Support Talk Line (1-9pm daily)  [phone] - Teen Support Talk Line (1-9pm daily)  [phone] - [facility]   [facility] [phone]- Crisis    [facility] [phone] - Crisis   [facility] [phone] - Crisis    [facility]) [phone] - [facility] Crisis

## 2024-08-31 NOTE — ED NOTES
"8/31/2024 @ 1702:    Crisis met with the patient once deemed medically clear/BAL was under the legal limit. Crisis introduced self and role and performed a behavioral health and safety risk assessment.  When the patient initially presented to the ED, she informed MD that she was \"having thoughts of want to kill herself.  She reports doing 3 lines of meth 3 days ago and drinking a pint of vodka this morning.\"  Patient also informed MD of \"having auditory hallucinations and reports the voices telling her to kill herself.\"    Crisis inquired on her initial remarks made upon arrival to the ED.  The patient stated \"that's not true. A couple of nights ago I was with a girlfriend and tried Methamphetamines for the first time.\"  The patient reports that this was not in an attempt to harm herself.  The patient expressed that she didn't like the way the Methamphetamines made her feel, and that she woke up this morning still feeling \"off.\"  The patient then bought a pint of vodka and stated she had \"about 3 drinks\" and that \"it's really not that serious.\"  The patient reports that before today, the last time she had a drink was a week and a half ago. The patient explained that after drinking today \"I didn't feel like I wanted to die, I just felt down and out.\" The patient currently resides in a apartment complex through mental health services and has lived there for 8 1/2 months.  The patient has an ACT Team and has been following them since 2008.  The patient reports that she only hears voices when she is \"having a mental relapse\" and that she is currently not hearing voices. The patient was last inpatient for behavioral health treatment in June of 2024 at [facility].  The patient states that she is diagnosed with Schizoaffective Disorder, and PTSD.  The patient has an outpatient Psychiatrist through her ACT Team (Dr. Link) and several case workers. The patient expressed that \"I should have called them first\" and that she " "feels she \"flew the coop.\"  The patient reports that she feels \"sad that I came here\" and that she \"wasn't thinking clearly\" and that this is \"unnecessary.\" The patient denies paranoia and hallucinations.  The patient admits to feeling delusional from \"time to time\" and provided examples of \"not feeling like I'm in the room\" or \"up in a cloud.\" The patient reports a stable food intake and sleep regiment.  The patient is denying current SI, HI, and SIB.  The patient is requesting to go home at this time.       "

## 2024-09-01 ENCOUNTER — OFFICE VISIT (OUTPATIENT)
Dept: URGENT CARE | Facility: CLINIC | Age: 54
End: 2024-09-01
Payer: COMMERCIAL

## 2024-09-01 VITALS
SYSTOLIC BLOOD PRESSURE: 129 MMHG | DIASTOLIC BLOOD PRESSURE: 86 MMHG | HEART RATE: 110 BPM | OXYGEN SATURATION: 94 % | RESPIRATION RATE: 18 BRPM | TEMPERATURE: 98.5 F

## 2024-09-01 DIAGNOSIS — R05.1 ACUTE COUGH: ICD-10-CM

## 2024-09-01 DIAGNOSIS — B37.0 THRUSH: Primary | ICD-10-CM

## 2024-09-01 PROCEDURE — 99213 OFFICE O/P EST LOW 20 MIN: CPT | Performed by: PHYSICIAN ASSISTANT

## 2024-09-01 RX ORDER — CLOTRIMAZOLE 10 MG/1
10 LOZENGE ORAL
Qty: 70 TROCHE | Refills: 0 | Status: SHIPPED | OUTPATIENT
Start: 2024-09-01

## 2024-09-01 RX ORDER — BROMPHENIRAMINE MALEATE, PSEUDOEPHEDRINE HYDROCHLORIDE, AND DEXTROMETHORPHAN HYDROBROMIDE 2; 30; 10 MG/5ML; MG/5ML; MG/5ML
5 SYRUP ORAL 4 TIMES DAILY PRN
Qty: 118 ML | Refills: 0 | Status: SHIPPED | OUTPATIENT
Start: 2024-09-01 | End: 2024-09-12
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
## 2024-09-01 NOTE — PATIENT INSTRUCTIONS
Use Mycelex troches as instructed.  Can take Bromfed as instructed for cough and congestion.  Follow-up with PCP.
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
## 2024-09-06 ENCOUNTER — APPOINTMENT (OUTPATIENT)
Dept: LAB | Facility: CLINIC | Age: 54
End: 2024-09-06
Payer: COMMERCIAL

## 2024-09-06 DIAGNOSIS — Z79.899 ENCOUNTER FOR LONG-TERM (CURRENT) USE OF OTHER MEDICATIONS: ICD-10-CM

## 2024-09-06 PROCEDURE — 80324 DRUG SCREEN AMPHETAMINES 1/2: CPT

## 2024-09-06 PROCEDURE — 80321 ALCOHOLS BIOMARKERS 1OR 2: CPT

## 2024-09-06 PROCEDURE — 80359 METHYLENEDIOXYAMPHETAMINES: CPT

## 2024-09-06 PROCEDURE — 80307 DRUG TEST PRSMV CHEM ANLYZR: CPT

## 2024-09-10 DIAGNOSIS — F10.10 ALCOHOL ABUSE: ICD-10-CM

## 2024-09-10 DIAGNOSIS — R05.1 ACUTE COUGH: ICD-10-CM

## 2024-09-10 DIAGNOSIS — E55.9 VITAMIN D DEFICIENCY: Primary | ICD-10-CM

## 2024-09-10 DIAGNOSIS — J40 BRONCHITIS: ICD-10-CM

## 2024-09-10 RX ORDER — BROMPHENIRAMINE MALEATE, PSEUDOEPHEDRINE HYDROCHLORIDE, AND DEXTROMETHORPHAN HYDROBROMIDE 2; 30; 10 MG/5ML; MG/5ML; MG/5ML
5 SYRUP ORAL 4 TIMES DAILY PRN
Qty: 118 ML | Refills: 0 | Status: CANCELLED | OUTPATIENT
Start: 2024-09-10

## 2024-09-10 NOTE — TELEPHONE ENCOUNTER
Pt called in and said she saw Dr Lew 8/21 and she was given medicine for bronchial asthma symptoms. Pt would like a refill for the cough medicine and the prednisone. She said she needs something for the thrush as her throat hurts a lot. She is wondering if she can have refills for these without having to schedule another appt.     Pt said she was given vitamins while in hospital and is wondering if the dr can refill those as well. Please advise and notify.

## 2024-09-11 LAB
6MAM UR QL SCN: NEGATIVE NG/ML
ACCEPTABLE CREAT UR QL: 21 MG/DL
AMPHET UR QL CFM: 1538 NG/MG CREAT
AMPHET UR QL SCN: ABNORMAL NG/ML
AMPHETAMINES: ABNORMAL
BARBITURATES UR QL SCN: NEGATIVE NG/ML
BENZODIAZ UR QL SCN: NEGATIVE NG/ML
BUPRENORPHINE UR QL CFM: NEGATIVE NG/ML
CANNABINOIDS UR QL SCN: NEGATIVE NG/ML
CARISOPRODOL UR QL: NEGATIVE NG/ML
COCAINE+BZE UR QL SCN: NEGATIVE NG/ML
ETHANOL BIOMARKERS: ABNORMAL
ETHYL GLUCURONIDE UR QL CFM: ABNORMAL NG/MG CREAT
ETHYL GLUCURONIDE UR QL SCN: ABNORMAL NG/ML
ETHYL SULFATE UR QL CFM: ABNORMAL NG/MG CREAT
FENTANYL UR QL SCN: NEGATIVE NG/ML
GABAPENTIN SERPLBLD QL SCN: NEGATIVE UG/ML
MDMA UR QL CFM: NOT DETECTED NG/MG CREAT
MDMA UR QL CFM: NOT DETECTED NG/MG CREAT
METHADONE UR QL SCN: NEGATIVE NG/ML
METHAMPHET UR QL CFM: 3500 NG/MG CREAT
NITRITE UR QL STRIP: NEGATIVE UG/ML
OPIATES UR QL SCN: NEGATIVE NG/ML
OXYCODONE+OXYMORPHONE UR QL SCN: NEGATIVE NG/ML
PCP UR QL SCN: NEGATIVE NG/ML
PROPOXYPH UR QL SCN: NEGATIVE NG/ML
SPECIMEN PH ACCEPTABLE UR: 7.3 (ref 4.5–8.9)
TAPENTADOL UR QL SCN: NEGATIVE NG/ML
TRAMADOL UR QL SCN: NEGATIVE NG/ML

## 2024-09-12 DIAGNOSIS — K21.9 GASTROESOPHAGEAL REFLUX DISEASE WITHOUT ESOPHAGITIS: ICD-10-CM

## 2024-09-12 RX ORDER — PANTOPRAZOLE SODIUM 40 MG/1
40 TABLET, DELAYED RELEASE ORAL
Qty: 90 TABLET | Refills: 1 | Status: SHIPPED | OUTPATIENT
Start: 2024-09-12

## 2024-09-12 RX ORDER — DEXTROMETHORPHAN HYDROBROMIDE AND PROMETHAZINE HYDROCHLORIDE 15; 6.25 MG/5ML; MG/5ML
5 SYRUP ORAL 4 TIMES DAILY PRN
Qty: 180 ML | Refills: 0 | Status: SHIPPED | OUTPATIENT
Start: 2024-09-12

## 2024-09-12 RX ORDER — UBIDECARENONE 100 MG
1000 CAPSULE ORAL DAILY
Qty: 90 CAPSULE | Refills: 1 | Status: SHIPPED | OUTPATIENT
Start: 2024-09-12

## 2024-09-12 RX ORDER — FOLIC ACID 1 MG/1
1 TABLET ORAL DAILY
Qty: 30 TABLET | Refills: 0 | Status: SHIPPED | OUTPATIENT
Start: 2024-09-12

## 2024-09-25 ENCOUNTER — HOSPITAL ENCOUNTER (EMERGENCY)
Facility: HOSPITAL | Age: 54
End: 2024-09-26
Attending: FAMILY MEDICINE | Admitting: EMERGENCY MEDICINE
Payer: COMMERCIAL

## 2024-09-25 ENCOUNTER — TELEPHONE (OUTPATIENT)
Age: 54
End: 2024-09-25

## 2024-09-25 VITALS
TEMPERATURE: 98 F | OXYGEN SATURATION: 91 % | DIASTOLIC BLOOD PRESSURE: 97 MMHG | HEART RATE: 103 BPM | SYSTOLIC BLOOD PRESSURE: 147 MMHG | RESPIRATION RATE: 19 BRPM

## 2024-09-25 DIAGNOSIS — F32.A DEPRESSION: ICD-10-CM

## 2024-09-25 DIAGNOSIS — R45.851 SUICIDAL IDEATIONS: Primary | ICD-10-CM

## 2024-09-25 LAB
ALBUMIN SERPL BCG-MCNC: 4.1 G/DL (ref 3.5–5)
ALP SERPL-CCNC: 89 U/L (ref 34–104)
ALT SERPL W P-5'-P-CCNC: 16 U/L (ref 7–52)
AMPHETAMINES SERPL QL SCN: NEGATIVE
ANION GAP SERPL CALCULATED.3IONS-SCNC: 14 MMOL/L (ref 4–13)
AST SERPL W P-5'-P-CCNC: 18 U/L (ref 13–39)
BARBITURATES UR QL: NEGATIVE
BASOPHILS # BLD AUTO: 0.04 THOUSANDS/ΜL (ref 0–0.1)
BASOPHILS NFR BLD AUTO: 0 % (ref 0–1)
BENZODIAZ UR QL: NEGATIVE
BILIRUB SERPL-MCNC: 0.21 MG/DL (ref 0.2–1)
BILIRUB UR QL STRIP: NEGATIVE
BUN SERPL-MCNC: 3 MG/DL (ref 5–25)
CALCIUM SERPL-MCNC: 8.8 MG/DL (ref 8.4–10.2)
CHLORIDE SERPL-SCNC: 93 MMOL/L (ref 96–108)
CLARITY UR: CLEAR
CO2 SERPL-SCNC: 23 MMOL/L (ref 21–32)
COCAINE UR QL: NEGATIVE
COLOR UR: ABNORMAL
CREAT SERPL-MCNC: 0.44 MG/DL (ref 0.6–1.3)
EOSINOPHIL # BLD AUTO: 0.25 THOUSAND/ΜL (ref 0–0.61)
EOSINOPHIL NFR BLD AUTO: 2 % (ref 0–6)
ERYTHROCYTE [DISTWIDTH] IN BLOOD BY AUTOMATED COUNT: 14.4 % (ref 11.6–15.1)
ETHANOL SERPL-MCNC: 55 MG/DL
ETHANOL SERPL-MCNC: <10 MG/DL
FENTANYL UR QL SCN: NEGATIVE
FLUAV AG UPPER RESP QL IA.RAPID: NEGATIVE
FLUBV AG UPPER RESP QL IA.RAPID: NEGATIVE
GFR SERPL CREATININE-BSD FRML MDRD: 115 ML/MIN/1.73SQ M
GLUCOSE SERPL-MCNC: 95 MG/DL (ref 65–140)
GLUCOSE UR STRIP-MCNC: NEGATIVE MG/DL
HCT VFR BLD AUTO: 40.3 % (ref 34.8–46.1)
HGB BLD-MCNC: 13.3 G/DL (ref 11.5–15.4)
HGB UR QL STRIP.AUTO: NEGATIVE
HYDROCODONE UR QL SCN: NEGATIVE
IMM GRANULOCYTES # BLD AUTO: 0.11 THOUSAND/UL (ref 0–0.2)
IMM GRANULOCYTES NFR BLD AUTO: 1 % (ref 0–2)
KETONES UR STRIP-MCNC: NEGATIVE MG/DL
LEUKOCYTE ESTERASE UR QL STRIP: NEGATIVE
LYMPHOCYTES # BLD AUTO: 2.47 THOUSANDS/ΜL (ref 0.6–4.47)
LYMPHOCYTES NFR BLD AUTO: 21 % (ref 14–44)
MCH RBC QN AUTO: 29.5 PG (ref 26.8–34.3)
MCHC RBC AUTO-ENTMCNC: 33 G/DL (ref 31.4–37.4)
MCV RBC AUTO: 89 FL (ref 82–98)
METHADONE UR QL: NEGATIVE
MONOCYTES # BLD AUTO: 0.75 THOUSAND/ΜL (ref 0.17–1.22)
MONOCYTES NFR BLD AUTO: 6 % (ref 4–12)
NEUTROPHILS # BLD AUTO: 8.33 THOUSANDS/ΜL (ref 1.85–7.62)
NEUTS SEG NFR BLD AUTO: 70 % (ref 43–75)
NITRITE UR QL STRIP: NEGATIVE
NRBC BLD AUTO-RTO: 0 /100 WBCS
OPIATES UR QL SCN: NEGATIVE
OXYCODONE+OXYMORPHONE UR QL SCN: NEGATIVE
PCP UR QL: NEGATIVE
PH UR STRIP.AUTO: 6 [PH]
PLATELET # BLD AUTO: 241 THOUSANDS/UL (ref 149–390)
PMV BLD AUTO: 8.6 FL (ref 8.9–12.7)
POTASSIUM SERPL-SCNC: 3.2 MMOL/L (ref 3.5–5.3)
PROT SERPL-MCNC: 7 G/DL (ref 6.4–8.4)
PROT UR STRIP-MCNC: NEGATIVE MG/DL
RBC # BLD AUTO: 4.51 MILLION/UL (ref 3.81–5.12)
S PYO DNA THROAT QL NAA+PROBE: NOT DETECTED
SARS-COV+SARS-COV-2 AG RESP QL IA.RAPID: NEGATIVE
SODIUM SERPL-SCNC: 130 MMOL/L (ref 135–147)
SP GR UR STRIP.AUTO: <=1.005
THC UR QL: NEGATIVE
TSH SERPL DL<=0.05 MIU/L-ACNC: 1.28 UIU/ML (ref 0.45–4.5)
UROBILINOGEN UR QL STRIP.AUTO: 0.2 E.U./DL
WBC # BLD AUTO: 11.95 THOUSAND/UL (ref 4.31–10.16)

## 2024-09-25 PROCEDURE — 99285 EMERGENCY DEPT VISIT HI MDM: CPT | Performed by: FAMILY MEDICINE

## 2024-09-25 PROCEDURE — 81003 URINALYSIS AUTO W/O SCOPE: CPT | Performed by: FAMILY MEDICINE

## 2024-09-25 PROCEDURE — 36415 COLL VENOUS BLD VENIPUNCTURE: CPT | Performed by: FAMILY MEDICINE

## 2024-09-25 PROCEDURE — 87651 STREP A DNA AMP PROBE: CPT | Performed by: FAMILY MEDICINE

## 2024-09-25 PROCEDURE — 87804 INFLUENZA ASSAY W/OPTIC: CPT | Performed by: FAMILY MEDICINE

## 2024-09-25 PROCEDURE — 84443 ASSAY THYROID STIM HORMONE: CPT | Performed by: FAMILY MEDICINE

## 2024-09-25 PROCEDURE — 80053 COMPREHEN METABOLIC PANEL: CPT | Performed by: FAMILY MEDICINE

## 2024-09-25 PROCEDURE — 85025 COMPLETE CBC W/AUTO DIFF WBC: CPT | Performed by: FAMILY MEDICINE

## 2024-09-25 PROCEDURE — 80307 DRUG TEST PRSMV CHEM ANLYZR: CPT | Performed by: FAMILY MEDICINE

## 2024-09-25 PROCEDURE — 82077 ASSAY SPEC XCP UR&BREATH IA: CPT | Performed by: FAMILY MEDICINE

## 2024-09-25 PROCEDURE — 87811 SARS-COV-2 COVID19 W/OPTIC: CPT | Performed by: FAMILY MEDICINE

## 2024-09-25 PROCEDURE — 99285 EMERGENCY DEPT VISIT HI MDM: CPT

## 2024-09-25 RX ORDER — BISACODYL 10 MG
10 SUPPOSITORY, RECTAL RECTAL DAILY PRN
Status: CANCELLED | OUTPATIENT
Start: 2024-09-25

## 2024-09-25 RX ORDER — HALOPERIDOL 0.5 MG/1
1 TABLET ORAL EVERY 6 HOURS PRN
Status: CANCELLED | OUTPATIENT
Start: 2024-09-25

## 2024-09-25 RX ORDER — POLYETHYLENE GLYCOL 3350 17 G/17G
17 POWDER, FOR SOLUTION ORAL DAILY PRN
Status: CANCELLED | OUTPATIENT
Start: 2024-09-25

## 2024-09-25 RX ORDER — ACETAMINOPHEN 325 MG/1
650 TABLET ORAL EVERY 4 HOURS PRN
Status: CANCELLED | OUTPATIENT
Start: 2024-09-25

## 2024-09-25 RX ORDER — BENZTROPINE MESYLATE 1 MG/ML
1 INJECTION, SOLUTION INTRAMUSCULAR; INTRAVENOUS
Status: CANCELLED | OUTPATIENT
Start: 2024-09-25

## 2024-09-25 RX ORDER — HYDROXYZINE HYDROCHLORIDE 50 MG/1
100 TABLET, FILM COATED ORAL
Status: CANCELLED | OUTPATIENT
Start: 2024-09-25

## 2024-09-25 RX ORDER — ALBUTEROL SULFATE 90 UG/1
2 INHALANT RESPIRATORY (INHALATION) EVERY 4 HOURS PRN
Status: CANCELLED | OUTPATIENT
Start: 2024-09-25

## 2024-09-25 RX ORDER — BENZTROPINE MESYLATE 0.5 MG/1
1 TABLET ORAL
Status: CANCELLED | OUTPATIENT
Start: 2024-09-25

## 2024-09-25 RX ORDER — PANTOPRAZOLE SODIUM 40 MG/1
40 TABLET, DELAYED RELEASE ORAL
Status: CANCELLED | OUTPATIENT
Start: 2024-09-26

## 2024-09-25 RX ORDER — HYDROXYZINE HYDROCHLORIDE 50 MG/1
25 TABLET, FILM COATED ORAL
Status: CANCELLED | OUTPATIENT
Start: 2024-09-25

## 2024-09-25 RX ORDER — LAMOTRIGINE 100 MG/1
100 TABLET ORAL 2 TIMES DAILY
Status: CANCELLED | OUTPATIENT
Start: 2024-09-25

## 2024-09-25 RX ORDER — MAGNESIUM HYDROXIDE/ALUMINUM HYDROXICE/SIMETHICONE 120; 1200; 1200 MG/30ML; MG/30ML; MG/30ML
30 SUSPENSION ORAL EVERY 4 HOURS PRN
Status: CANCELLED | OUTPATIENT
Start: 2024-09-25

## 2024-09-25 RX ORDER — HALOPERIDOL 5 MG/1
5 TABLET ORAL
Status: CANCELLED | OUTPATIENT
Start: 2024-09-25

## 2024-09-25 RX ORDER — HALOPERIDOL 5 MG/ML
5 INJECTION INTRAMUSCULAR
Status: CANCELLED | OUTPATIENT
Start: 2024-09-25

## 2024-09-25 RX ORDER — NICOTINE 21 MG/24HR
1 PATCH, TRANSDERMAL 24 HOURS TRANSDERMAL DAILY
Status: CANCELLED | OUTPATIENT
Start: 2024-09-26

## 2024-09-25 RX ORDER — DIPHENHYDRAMINE HYDROCHLORIDE 50 MG/ML
50 INJECTION INTRAMUSCULAR; INTRAVENOUS EVERY 6 HOURS PRN
Status: CANCELLED | OUTPATIENT
Start: 2024-09-25

## 2024-09-25 RX ORDER — LORAZEPAM 1 MG/1
1 TABLET ORAL ONCE
Status: COMPLETED | OUTPATIENT
Start: 2024-09-25 | End: 2024-09-25

## 2024-09-25 RX ORDER — RISPERIDONE 1 MG/1
1 TABLET ORAL 3 TIMES DAILY
Status: CANCELLED | OUTPATIENT
Start: 2024-09-25

## 2024-09-25 RX ORDER — AMOXICILLIN 250 MG
1 CAPSULE ORAL DAILY PRN
Status: CANCELLED | OUTPATIENT
Start: 2024-09-25

## 2024-09-25 RX ORDER — PROPRANOLOL HCL 20 MG
10 TABLET ORAL EVERY 8 HOURS PRN
Status: CANCELLED | OUTPATIENT
Start: 2024-09-25

## 2024-09-25 RX ORDER — LORAZEPAM 2 MG/ML
2 INJECTION INTRAMUSCULAR
Status: CANCELLED | OUTPATIENT
Start: 2024-09-25

## 2024-09-25 RX ORDER — LORAZEPAM 2 MG/ML
1 INJECTION INTRAMUSCULAR
Status: CANCELLED | OUTPATIENT
Start: 2024-09-25

## 2024-09-25 RX ORDER — LORAZEPAM 2 MG/ML
2 INJECTION INTRAMUSCULAR EVERY 6 HOURS PRN
Status: CANCELLED | OUTPATIENT
Start: 2024-09-25

## 2024-09-25 RX ORDER — ATORVASTATIN CALCIUM 10 MG/1
10 TABLET, FILM COATED ORAL DAILY
Status: CANCELLED | OUTPATIENT
Start: 2024-09-26

## 2024-09-25 RX ORDER — BENZTROPINE MESYLATE 1 MG/ML
0.5 INJECTION, SOLUTION INTRAMUSCULAR; INTRAVENOUS
Status: CANCELLED | OUTPATIENT
Start: 2024-09-25

## 2024-09-25 RX ORDER — ACETAMINOPHEN 325 MG/1
975 TABLET ORAL EVERY 6 HOURS PRN
Status: CANCELLED | OUTPATIENT
Start: 2024-09-25

## 2024-09-25 RX ORDER — ACETAMINOPHEN 325 MG/1
650 TABLET ORAL EVERY 6 HOURS PRN
Status: CANCELLED | OUTPATIENT
Start: 2024-09-25

## 2024-09-25 RX ORDER — TRAZODONE HYDROCHLORIDE 50 MG/1
50 TABLET, FILM COATED ORAL
Status: CANCELLED | OUTPATIENT
Start: 2024-09-25

## 2024-09-25 RX ORDER — POTASSIUM CHLORIDE 1500 MG/1
40 TABLET, EXTENDED RELEASE ORAL ONCE
Status: COMPLETED | OUTPATIENT
Start: 2024-09-25 | End: 2024-09-25

## 2024-09-25 RX ORDER — LANOLIN ALCOHOL/MO/W.PET/CERES
3 CREAM (GRAM) TOPICAL
Status: CANCELLED | OUTPATIENT
Start: 2024-09-25

## 2024-09-25 RX ORDER — HALOPERIDOL 5 MG/ML
2.5 INJECTION INTRAMUSCULAR
Status: CANCELLED | OUTPATIENT
Start: 2024-09-25

## 2024-09-25 RX ORDER — HYDROXYZINE HYDROCHLORIDE 50 MG/1
50 TABLET, FILM COATED ORAL
Status: CANCELLED | OUTPATIENT
Start: 2024-09-25

## 2024-09-25 RX ADMIN — POTASSIUM CHLORIDE 40 MEQ: 1500 TABLET, EXTENDED RELEASE ORAL at 16:29

## 2024-09-25 RX ADMIN — LORAZEPAM 1 MG: 1 TABLET ORAL at 17:55

## 2024-09-25 NOTE — TELEPHONE ENCOUNTER
Patient calls with complaints of lungs hurting, nose running and oral thrush for about 2 weeks now.  She does get SOB on exertion.  She has been taking OTC meds to get her through the day.  She specifically requested an appointment for Friday.  I offered what was available that day and she declined stating she cannot come that late in the day.  I offered appointments for later today or even tomorrow and she again declined, asking for Monday.  Informed her Dr. Lew is not in on Monday.    She is requesting something be called into her pharmacy since she cannot make it into the office this week.  Please advise.

## 2024-09-25 NOTE — ED PROVIDER NOTES
1. Suicidal ideations    2. Depression      ED Disposition       ED Disposition   Transfer to Behavioral Health Condition   --    Date/Time   Wed Sep 25, 2024  3:41 PM    Comment   Jo-Ann Lamb should be transferred out to  and has been medically cleared.               Assessment & Plan       Medical Decision Making  Amount and/or Complexity of Data Reviewed  Labs: ordered.    Risk  Prescription drug management.  Decision regarding hospitalization.    54-year-old female presented to ED with a complaint of depression and SI.  Patient states that she knows her girl about 15 years ago and thinks that she hypnotized her.  Patient has a plan of drinking and taking pills.  Denies any hallucination.  Lab order  Patient is medically clear for inpatient behavioral admission  Lab reviewed which shows low potassium which is replaced alcohol is 55 will repeat another pending crisis evaluation. Pt signed 201   Pt care transfer to             ED Course as of 09/25/24 2131   Wed Sep 25, 2024   2126 Pt signed 201. Pt care transfer to Dr. Yoder       Medications   potassium chloride (Klor-Con M20) CR tablet 40 mEq (40 mEq Oral Given 9/25/24 1629)   LORazepam (ATIVAN) tablet 1 mg (1 mg Oral Given 9/25/24 1755)       History of Present Illness     54-year-old female presented to ED with a complaint of depression and SI.  Patient states that she knows her girl about 15 years ago and thinks that she hypnotized her.  Patient has a plan of drinking and taking pills.  Denies any hallucination.    Psychiatric Evaluation  Presenting symptoms: suicidal thoughts    Associated symptoms: anxiety    Associated symptoms: no abdominal pain, no chest pain and no headaches        Review of Systems   Constitutional:  Negative for chills and fever.   HENT:  Negative for rhinorrhea and sore throat.    Eyes:  Negative for visual disturbance.   Respiratory:  Negative for cough and shortness of breath.    Cardiovascular:  Negative for chest pain  and leg swelling.   Gastrointestinal:  Negative for abdominal pain, diarrhea, nausea and vomiting.   Genitourinary:  Negative for dysuria.   Musculoskeletal:  Negative for back pain and myalgias.   Skin:  Negative for rash.   Neurological:  Negative for dizziness and headaches.   Psychiatric/Behavioral:  Positive for suicidal ideas. Negative for confusion. The patient is nervous/anxious.    All other systems reviewed and are negative.          Objective     ED Triage Vitals [09/25/24 1344]   Temperature Pulse Blood Pressure Respirations SpO2 Patient Position - Orthostatic VS   98 °F (36.7 °C) 103 147/97 19 91 % --      Temp Source Heart Rate Source BP Location FiO2 (%) Pain Score    Oral Monitor -- -- No Pain        Physical Exam  Vitals and nursing note reviewed.   Constitutional:       Appearance: She is well-developed.   HENT:      Head: Normocephalic and atraumatic.      Right Ear: External ear normal.      Left Ear: External ear normal.      Nose: Nose normal.      Mouth/Throat:      Mouth: Mucous membranes are moist.      Pharynx: No oropharyngeal exudate.   Eyes:      General: No scleral icterus.        Right eye: No discharge.         Left eye: No discharge.      Conjunctiva/sclera: Conjunctivae normal.      Pupils: Pupils are equal, round, and reactive to light.   Cardiovascular:      Rate and Rhythm: Normal rate and regular rhythm.      Pulses: Normal pulses.      Heart sounds: Normal heart sounds.   Pulmonary:      Effort: Pulmonary effort is normal. No respiratory distress.      Breath sounds: Normal breath sounds. No wheezing.   Abdominal:      General: Bowel sounds are normal.      Palpations: Abdomen is soft.   Musculoskeletal:         General: Normal range of motion.      Cervical back: Normal range of motion and neck supple.   Lymphadenopathy:      Cervical: No cervical adenopathy.   Skin:     General: Skin is warm and dry.      Capillary Refill: Capillary refill takes less than 2 seconds.    Neurological:      General: No focal deficit present.      Mental Status: She is alert and oriented to person, place, and time.   Psychiatric:         Mood and Affect: Mood is anxious and depressed.         Thought Content: Thought content is paranoid. Thought content includes suicidal ideation. Thought content includes suicidal plan.         Labs Reviewed   CBC AND DIFFERENTIAL - Abnormal       Result Value    WBC 11.95 (*)     RBC 4.51      Hemoglobin 13.3      Hematocrit 40.3      MCV 89      MCH 29.5      MCHC 33.0      RDW 14.4      MPV 8.6 (*)     Platelets 241      nRBC 0      Segmented % 70      Immature Grans % 1      Lymphocytes % 21      Monocytes % 6      Eosinophils Relative 2      Basophils Relative 0      Absolute Neutrophils 8.33 (*)     Absolute Immature Grans 0.11      Absolute Lymphocytes 2.47      Absolute Monocytes 0.75      Eosinophils Absolute 0.25      Basophils Absolute 0.04     COMPREHENSIVE METABOLIC PANEL - Abnormal    Sodium 130 (*)     Potassium 3.2 (*)     Chloride 93 (*)     CO2 23      ANION GAP 14 (*)     BUN 3 (*)     Creatinine 0.44 (*)     Glucose 95      Calcium 8.8      AST 18      ALT 16      Alkaline Phosphatase 89      Total Protein 7.0      Albumin 4.1      Total Bilirubin 0.21      eGFR 115      Narrative:     [employer] Kid[employer] guidelines for Chronic Kidney Disease (CKD):     Stage 1 with normal or high GFR (GFR > 90 mL/min/1.73 square meters)    Stage 2 Mild CKD (GFR = 60-89 mL/min/1.73 square meters)    Stage 3A Moderate CKD (GFR = 45-59 mL/min/1.73 square meters)    Stage 3B Moderate CKD (GFR = 30-44 mL/min/1.73 square meters)    Stage 4 Severe CKD (GFR = 15-29 mL/min/1.73 square meters)    Stage 5 End Stage CKD (GFR <15 mL/min/1.73 square meters)  Note: GFR calculation is accurate only with a steady state creatinine   MEDICAL ALCOHOL - Abnormal    Ethanol Lvl 55 (*)    UA W REFLEX TO MICROSCOPIC WITH REFLEX TO CULTURE - Abnormal    Color, UA Straw       Clarity, UA Clear      Specific Gravity, UA <=1.005 (*)     pH, UA 6.0      Leukocytes, UA Negative      Nitrite, UA Negative      Protein, UA Negative      Glucose, UA Negative      Ketones, UA Negative      Urobilinogen, UA 0.2      Bilirubin, UA Negative      Occult Blood, UA Negative     COVID-19/INFLUENZA A/B RAPID ANTIGEN (30 MIN.TAT) - Normal    SARS COV Rapid Antigen Negative      Influenza A Rapid Antigen Negative      Influenza B Rapid Antigen Negative      Narrative:     This test has been performed using the Joyride Lola 2 FLU+SARS Antigen test under the Emergency Use Authorization (EUA). This test has been validated by the  and verified by the performing laboratory. The Lola uses lateral flow immunofluorescent sandwich assay to detect SARS-COV, Influenza A and Influenza B Antigen.     The Quidel Lola 2 SARS Antigen test does not differentiate between SARS-CoV and SARS-CoV-2.     Negative results are presumptive and may be confirmed with a molecular assay, if necessary, for patient management. Negative results do not rule out SARS-CoV-2 or influenza infection and should not be used as the sole basis for treatment or patient management decisions. A negative test result may occur if the level of antigen in a sample is below the limit of detection of this test.     Positive results are indicative of the presence of viral antigens, but do not rule out bacterial infection or co-infection with other viruses.     All test results should be used as an adjunct to clinical observations and other information available to the provider.    FOR PEDIATRIC PATIENTS - copy/paste COVID Guidelines URL to browser: [email or web address]   STREP A PCR - Normal    STREP A PCR Not Detected     RAPID DRUG SCREEN, URINE - Normal    Amph/Meth UR Negative      Barbiturate Ur Negative      Benzodiazepine Urine Negative      Cocaine Urine Negative      Methadone  Urine Negative      Opiate Urine Negative      PCP Ur Negative      THC Urine Negative      Oxycodone Urine Negative      Fentanyl Urine Negative      HYDROCODONE URINE Negative      Narrative:     FOR MEDICAL PURPOSES ONLY.   IF CONFIRMATION NEEDED PLEASE CONTACT THE LAB WITHIN 5 DAYS.    Drug Screen Cutoff Levels:  AMPHETAMINE/METHAMPHETAMINES  1000 ng/mL  BARBITURATES     200 ng/mL  BENZODIAZEPINES     200 ng/mL  COCAINE      300 ng/mL  METHADONE      300 ng/mL  OPIATES      300 ng/mL  PHENCYCLIDINE     25 ng/mL  THC       50 ng/mL  OXYCODONE      100 ng/mL  FENTANYL      5 ng/mL  HYDROCODONE     300 ng/mL   TSH, 3RD GENERATION - Normal    TSH 3RD GENERATON 1.283     MEDICAL ALCOHOL - Normal    Ethanol Lvl <10     POCT ALCOHOL BREATH TEST     No orders to display       Procedures    ED Medication and Procedure Management   Prior to Admission Medications   Prescriptions Last Dose Informant Patient Reported? Taking?   D3-1000 25 MCG (1000 UT) capsule   No No   Sig: Take 1 capsule (1,000 Units total) by mouth daily   Guaifenesin 1200 MG TB12   No No   Sig: Take 1 tablet (1,200 mg total) by mouth every 12 (twelve) hours   Incontinence Supply Disposable (Depend Underwear Sm/Med) MISC   No No   Sig: Use 3 (three) times a day as needed (incontinence)   LORazepam (ATIVAN) 0.5 mg tablet   No No   Sig: Take 1 tablet (0.5 mg total) by mouth 2 (two) times a day for 15 days   albuterol (PROVENTIL HFA,VENTOLIN HFA) 90 mcg/act inhaler   No No   Sig: Inhale 2 puffs every 4 (four) hours as needed for wheezing   atorvastatin (LIPITOR) 10 mg tablet   No No   Sig: Take 1 tablet (10 mg total) by mouth daily   cloZAPine (CLOZARIL) 100 mg tablet   Yes No   clotrimazole (MYCELEX) 10 mg vanessa   No No   Sig: Take 1 tablet (10 mg total) by mouth 5 (five) times a day   clozapine (CLOZARIL) 50 MG tablet   No No   Sig: Take 1 tablet (50 mg total) by mouth in the morning for 14 days   clozapine (CLOZARIL) 50 MG tablet   No No   Sig: TAKE 7  TABLETS (350 MG TOTAL) BY MOUTH DAILY AT BEDTIME FOR 14 DAYS   fluticasone (FLONASE) 50 mcg/act nasal spray   No No   Si spray into each nostril daily   fluticasone-salmeterol (Advair HFA) 115-21 MCG/ACT inhaler   No No   Sig: Inhale 2 puffs 2 (two) times a day Rinse mouth after use.   folic acid (FOLVITE) 1 mg tablet   No No   Sig: Take 1 tablet (1 mg total) by mouth daily   hydrocortisone (ANUSOL-HC) 2.5 % rectal cream   No No   Sig: Apply topically 4 (four) times a day as needed for hemorrhoids   lamoTRIgine (LaMICtal) 100 mg tablet   No No   Sig: Take 1 tablet (100 mg total) by mouth 2 (two) times a day for 14 days   melatonin 3 mg   No No   Sig: Take 1 tablet (3 mg total) by mouth daily at bedtime   Patient not taking: Reported on 2024   methocarbamol (ROBAXIN) 500 mg tablet   No No   Sig: Take 1 tablet (500 mg total) by mouth 2 (two) times a day   methylPREDNISolone 4 MG tablet therapy pack   No No   Sig: Use as directed on package   nicotine (NICODERM CQ) 14 mg/24hr TD 24 hr patch   No No   Sig: Place 1 patch on the skin over 24 hours daily   nicotine polacrilex (NICORETTE) 4 mg gum   No No   Sig: Chew 1 each (4 mg total) every 2 (two) hours as needed for smoking cessation   pantoprazole (PROTONIX) 40 mg tablet   No No   Sig: TAKE 1 TABLET BY MOUTH DAILY BEFORE BREAKFAST   phenol (CHLORASEPTIC) 1.4 % mucosal liquid   No No   Sig: Apply 1 spray to the mouth or throat every 2 (two) hours as needed (Throat and mouth pain)   Patient not taking: Reported on 2024   polyethylene glycol (GLYCOLAX) 17 GM/SCOOP powder   No No   Sig: Take 17 g by mouth daily   promethazine-dextromethorphan (PHENERGAN-DM) 6.25-15 mg/5 mL oral syrup   No No   Sig: Take 5 mL by mouth 4 (four) times a day as needed for cough   risperiDONE (RisperDAL) 0.5 mg tablet   No No   Sig: TAKE 1 TABLET BY MOUTH THREE TIMES A DAY   Patient not taking: Reported on 2024   risperiDONE (RisperDAL) 1 mg tablet   Yes No   Si mg 3  (three) times a day   senna-docusate sodium (SENOKOT S) 8.6-50 mg per tablet   No No   Sig: Take 1 tablet by mouth 2 (two) times a day   simethicone (MYLICON) 80 mg chewable tablet   No No   Sig: Chew 1 tablet (80 mg total) 4 (four) times a day (with meals and at bedtime)   sucralfate (CARAFATE) 1 g tablet   No No   Sig: Take 1 tablet (1 g total) by mouth 4 (four) times a day   thiamine 100 MG tablet   No No   Sig: Take 1 tablet (100 mg total) by mouth daily   venlafaxine (EFFEXOR-XR) 75 mg 24 hr capsule   Yes No   Patient not taking: Reported on 8/31/2024      Facility-Administered Medications: None     Patient's Medications   Discharge Prescriptions    No medications on file     No discharge procedures on file.     Andrew Devine MD  09/25/24 0962       Andrew Devine MD  09/25/24 0168

## 2024-09-25 NOTE — ED NOTES
[facility] spoke with Jo-Ann at her request she stated that she wants to go home she isnt sure why she is here and knows she shouldn't have drank today.  She stated she has been medication compliant and has also been compliant with [facility], they were out on Monday.Denies being suicidal.

## 2024-09-25 NOTE — ED NOTES
Call placed to [facility]; left voicemail for return call to inform that pt is currently in the ED.

## 2024-09-26 ENCOUNTER — HOSPITAL ENCOUNTER (INPATIENT)
Facility: HOSPITAL | Age: 54
LOS: 6 days | Discharge: HOME | DRG: 750 | End: 2024-10-02
Attending: PSYCHIATRY & NEUROLOGY | Admitting: PSYCHIATRY & NEUROLOGY
Payer: COMMERCIAL

## 2024-09-26 DIAGNOSIS — M54.50 LUMBAGO: ICD-10-CM

## 2024-09-26 DIAGNOSIS — K21.9 GASTROESOPHAGEAL REFLUX DISEASE WITHOUT ESOPHAGITIS: ICD-10-CM

## 2024-09-26 DIAGNOSIS — R05.9 COUGH: ICD-10-CM

## 2024-09-26 DIAGNOSIS — R45.851 SUICIDAL IDEATIONS: ICD-10-CM

## 2024-09-26 DIAGNOSIS — E78.2 MIXED HYPERLIPIDEMIA: ICD-10-CM

## 2024-09-26 DIAGNOSIS — F43.12 POST-TRAUMATIC STRESS DISORDER, CHRONIC: Chronic | ICD-10-CM

## 2024-09-26 DIAGNOSIS — K59.04 CHRONIC IDIOPATHIC CONSTIPATION: ICD-10-CM

## 2024-09-26 DIAGNOSIS — R09.81 CONGESTION OF NASAL SINUS: ICD-10-CM

## 2024-09-26 DIAGNOSIS — K64.9 HEMORRHOIDS, UNSPECIFIED HEMORRHOID TYPE: ICD-10-CM

## 2024-09-26 DIAGNOSIS — J40 BRONCHITIS: ICD-10-CM

## 2024-09-26 DIAGNOSIS — F10.10 ALCOHOL ABUSE: ICD-10-CM

## 2024-09-26 DIAGNOSIS — N32.81 OVERACTIVE BLADDER: ICD-10-CM

## 2024-09-26 DIAGNOSIS — K59.01 SLOW TRANSIT CONSTIPATION: ICD-10-CM

## 2024-09-26 DIAGNOSIS — M54.9 BACK PAIN: ICD-10-CM

## 2024-09-26 DIAGNOSIS — E55.9 VITAMIN D DEFICIENCY: ICD-10-CM

## 2024-09-26 DIAGNOSIS — J43.9 PULMONARY EMPHYSEMA, UNSPECIFIED EMPHYSEMA TYPE (HCC): ICD-10-CM

## 2024-09-26 DIAGNOSIS — F25.0 SCHIZOAFFECTIVE DISORDER, BIPOLAR TYPE (HCC): Primary | Chronic | ICD-10-CM

## 2024-09-26 DIAGNOSIS — F17.200 NICOTINE ADDICTION: ICD-10-CM

## 2024-09-26 DIAGNOSIS — E53.8 VITAMIN B12 DEFICIENCY: ICD-10-CM

## 2024-09-26 DIAGNOSIS — J45.21 MILD INTERMITTENT ASTHMATIC BRONCHITIS WITH ACUTE EXACERBATION: ICD-10-CM

## 2024-09-26 DIAGNOSIS — B37.0 THRUSH: ICD-10-CM

## 2024-09-26 DIAGNOSIS — F17.200 NICOTINE USE DISORDER: ICD-10-CM

## 2024-09-26 DIAGNOSIS — K13.79 MOUTH SORES: ICD-10-CM

## 2024-09-26 LAB
25(OH)D3 SERPL-MCNC: 41.7 NG/ML (ref 30–100)
ALBUMIN SERPL BCG-MCNC: 4.1 G/DL (ref 3.5–5)
ALP SERPL-CCNC: 92 U/L (ref 34–104)
ALT SERPL W P-5'-P-CCNC: 16 U/L (ref 7–52)
ANION GAP SERPL CALCULATED.3IONS-SCNC: 8 MMOL/L (ref 4–13)
AST SERPL W P-5'-P-CCNC: 15 U/L (ref 13–39)
BACTERIA UR QL AUTO: ABNORMAL /HPF
BASOPHILS # BLD AUTO: 0.05 THOUSANDS/ΜL (ref 0–0.1)
BASOPHILS NFR BLD AUTO: 1 % (ref 0–1)
BILIRUB SERPL-MCNC: 0.2 MG/DL (ref 0.2–1)
BILIRUB UR QL STRIP: NEGATIVE
BUN SERPL-MCNC: 4 MG/DL (ref 5–25)
CALCIUM SERPL-MCNC: 9.6 MG/DL (ref 8.4–10.2)
CHLORIDE SERPL-SCNC: 102 MMOL/L (ref 96–108)
CHOLEST SERPL-MCNC: 209 MG/DL
CLARITY UR: CLEAR
CO2 SERPL-SCNC: 28 MMOL/L (ref 21–32)
COLOR UR: YELLOW
CREAT SERPL-MCNC: 0.51 MG/DL (ref 0.6–1.3)
CRP SERPL QL: 9 MG/L
EOSINOPHIL # BLD AUTO: 0.32 THOUSAND/ΜL (ref 0–0.61)
EOSINOPHIL NFR BLD AUTO: 3 % (ref 0–6)
ERYTHROCYTE [DISTWIDTH] IN BLOOD BY AUTOMATED COUNT: 14.6 % (ref 11.6–15.1)
FOLATE SERPL-MCNC: >22.3 NG/ML
GFR SERPL CREATININE-BSD FRML MDRD: 110 ML/MIN/1.73SQ M
GLUCOSE SERPL-MCNC: 92 MG/DL (ref 65–140)
GLUCOSE UR STRIP-MCNC: NEGATIVE MG/DL
HCG SERPL QL: NEGATIVE
HCT VFR BLD AUTO: 45.9 % (ref 34.8–46.1)
HDLC SERPL-MCNC: 71 MG/DL
HGB BLD-MCNC: 14.5 G/DL (ref 11.5–15.4)
HGB UR QL STRIP.AUTO: NEGATIVE
IMM GRANULOCYTES # BLD AUTO: 0.12 THOUSAND/UL (ref 0–0.2)
IMM GRANULOCYTES NFR BLD AUTO: 1 % (ref 0–2)
KETONES UR STRIP-MCNC: NEGATIVE MG/DL
LDLC SERPL CALC-MCNC: 66 MG/DL (ref 0–100)
LEUKOCYTE ESTERASE UR QL STRIP: NEGATIVE
LYMPHOCYTES # BLD AUTO: 1.74 THOUSANDS/ΜL (ref 0.6–4.47)
LYMPHOCYTES NFR BLD AUTO: 18 % (ref 14–44)
MCH RBC QN AUTO: 28.9 PG (ref 26.8–34.3)
MCHC RBC AUTO-ENTMCNC: 31.6 G/DL (ref 31.4–37.4)
MCV RBC AUTO: 92 FL (ref 82–98)
MONOCYTES # BLD AUTO: 0.71 THOUSAND/ΜL (ref 0.17–1.22)
MONOCYTES NFR BLD AUTO: 8 % (ref 4–12)
NEUTROPHILS # BLD AUTO: 6.52 THOUSANDS/ΜL (ref 1.85–7.62)
NEUTS SEG NFR BLD AUTO: 69 % (ref 43–75)
NITRITE UR QL STRIP: NEGATIVE
NON-SQ EPI CELLS URNS QL MICRO: ABNORMAL /HPF
NONHDLC SERPL-MCNC: 138 MG/DL
NRBC BLD AUTO-RTO: 0 /100 WBCS
PH UR STRIP.AUTO: 7.5 [PH]
PLATELET # BLD AUTO: 265 THOUSANDS/UL (ref 149–390)
PMV BLD AUTO: 9.3 FL (ref 8.9–12.7)
POTASSIUM SERPL-SCNC: 4.3 MMOL/L (ref 3.5–5.3)
PROT SERPL-MCNC: 6.8 G/DL (ref 6.4–8.4)
PROT UR STRIP-MCNC: NEGATIVE MG/DL
RBC # BLD AUTO: 5.01 MILLION/UL (ref 3.81–5.12)
RBC #/AREA URNS AUTO: ABNORMAL /HPF
SODIUM SERPL-SCNC: 138 MMOL/L (ref 135–147)
SP GR UR STRIP.AUTO: 1.01
TREPONEMA PALLIDUM IGG+IGM AB [PRESENCE] IN SERUM OR PLASMA BY IMMUNOASSAY: NORMAL
TRIGL SERPL-MCNC: 362 MG/DL
TSH SERPL DL<=0.05 MIU/L-ACNC: 1.64 UIU/ML (ref 0.45–4.5)
UROBILINOGEN UR QL STRIP.AUTO: 0.2 E.U./DL
VIT B12 SERPL-MCNC: 335 PG/ML (ref 180–914)
WBC # BLD AUTO: 9.46 THOUSAND/UL (ref 4.31–10.16)
WBC #/AREA URNS AUTO: ABNORMAL /HPF

## 2024-09-26 PROCEDURE — 80061 LIPID PANEL: CPT | Performed by: PSYCHIATRY & NEUROLOGY

## 2024-09-26 PROCEDURE — 99223 1ST HOSP IP/OBS HIGH 75: CPT | Performed by: HOSPITALIST

## 2024-09-26 PROCEDURE — 82607 VITAMIN B-12: CPT | Performed by: PSYCHIATRY & NEUROLOGY

## 2024-09-26 PROCEDURE — 93005 ELECTROCARDIOGRAM TRACING: CPT

## 2024-09-26 PROCEDURE — 82746 ASSAY OF FOLIC ACID SERUM: CPT | Performed by: PSYCHIATRY & NEUROLOGY

## 2024-09-26 PROCEDURE — 80053 COMPREHEN METABOLIC PANEL: CPT | Performed by: PSYCHIATRY & NEUROLOGY

## 2024-09-26 PROCEDURE — 85025 COMPLETE CBC W/AUTO DIFF WBC: CPT | Performed by: PSYCHIATRY & NEUROLOGY

## 2024-09-26 PROCEDURE — 84703 CHORIONIC GONADOTROPIN ASSAY: CPT | Performed by: PSYCHIATRY & NEUROLOGY

## 2024-09-26 PROCEDURE — 81001 URINALYSIS AUTO W/SCOPE: CPT | Performed by: PSYCHIATRY & NEUROLOGY

## 2024-09-26 PROCEDURE — 84443 ASSAY THYROID STIM HORMONE: CPT | Performed by: PSYCHIATRY & NEUROLOGY

## 2024-09-26 PROCEDURE — 86140 C-REACTIVE PROTEIN: CPT

## 2024-09-26 PROCEDURE — 86780 TREPONEMA PALLIDUM: CPT | Performed by: PSYCHIATRY & NEUROLOGY

## 2024-09-26 PROCEDURE — 82306 VITAMIN D 25 HYDROXY: CPT | Performed by: PSYCHIATRY & NEUROLOGY

## 2024-09-26 RX ORDER — BENZTROPINE MESYLATE 1 MG/ML
0.5 INJECTION, SOLUTION INTRAMUSCULAR; INTRAVENOUS
Status: DISCONTINUED | OUTPATIENT
Start: 2024-09-26 | End: 2024-10-02 | Stop reason: HOSPADM

## 2024-09-26 RX ORDER — HALOPERIDOL 5 MG/1
5 TABLET ORAL
Status: DISCONTINUED | OUTPATIENT
Start: 2024-09-26 | End: 2024-10-02 | Stop reason: HOSPADM

## 2024-09-26 RX ORDER — LANOLIN ALCOHOL/MO/W.PET/CERES
3 CREAM (GRAM) TOPICAL
Status: DISCONTINUED | OUTPATIENT
Start: 2024-09-26 | End: 2024-09-26

## 2024-09-26 RX ORDER — BENZTROPINE MESYLATE 1 MG/1
1 TABLET ORAL
Status: DISCONTINUED | OUTPATIENT
Start: 2024-09-26 | End: 2024-10-02 | Stop reason: HOSPADM

## 2024-09-26 RX ORDER — FOLIC ACID 1 MG/1
1 TABLET ORAL DAILY
Status: DISCONTINUED | OUTPATIENT
Start: 2024-09-26 | End: 2024-10-02 | Stop reason: HOSPADM

## 2024-09-26 RX ORDER — ACETAMINOPHEN 325 MG/1
975 TABLET ORAL EVERY 6 HOURS PRN
Status: DISCONTINUED | OUTPATIENT
Start: 2024-09-26 | End: 2024-09-28

## 2024-09-26 RX ORDER — LORAZEPAM 2 MG/ML
1 INJECTION INTRAMUSCULAR
Status: DISCONTINUED | OUTPATIENT
Start: 2024-09-26 | End: 2024-10-02 | Stop reason: HOSPADM

## 2024-09-26 RX ORDER — FLUTICASONE FUROATE AND VILANTEROL 100; 25 UG/1; UG/1
1 POWDER RESPIRATORY (INHALATION)
Status: DISCONTINUED | OUTPATIENT
Start: 2024-09-26 | End: 2024-10-02 | Stop reason: HOSPADM

## 2024-09-26 RX ORDER — HALOPERIDOL 5 MG/ML
5 INJECTION INTRAMUSCULAR
Status: DISCONTINUED | OUTPATIENT
Start: 2024-09-26 | End: 2024-10-02 | Stop reason: HOSPADM

## 2024-09-26 RX ORDER — PANTOPRAZOLE SODIUM 40 MG/1
40 TABLET, DELAYED RELEASE ORAL
Status: DISCONTINUED | OUTPATIENT
Start: 2024-09-26 | End: 2024-10-02 | Stop reason: HOSPADM

## 2024-09-26 RX ORDER — BENZTROPINE MESYLATE 1 MG/ML
1 INJECTION, SOLUTION INTRAMUSCULAR; INTRAVENOUS
Status: DISCONTINUED | OUTPATIENT
Start: 2024-09-26 | End: 2024-10-02 | Stop reason: HOSPADM

## 2024-09-26 RX ORDER — HALOPERIDOL 5 MG/ML
2.5 INJECTION INTRAMUSCULAR
Status: DISCONTINUED | OUTPATIENT
Start: 2024-09-26 | End: 2024-10-02 | Stop reason: HOSPADM

## 2024-09-26 RX ORDER — ACETAMINOPHEN 325 MG/1
650 TABLET ORAL EVERY 6 HOURS PRN
Status: DISCONTINUED | OUTPATIENT
Start: 2024-09-26 | End: 2024-10-02 | Stop reason: HOSPADM

## 2024-09-26 RX ORDER — LAMOTRIGINE 100 MG/1
100 TABLET ORAL 2 TIMES DAILY
Status: DISCONTINUED | OUTPATIENT
Start: 2024-09-26 | End: 2024-10-02 | Stop reason: HOSPADM

## 2024-09-26 RX ORDER — LANOLIN ALCOHOL/MO/W.PET/CERES
3 CREAM (GRAM) TOPICAL
Status: DISCONTINUED | OUTPATIENT
Start: 2024-09-26 | End: 2024-10-02 | Stop reason: HOSPADM

## 2024-09-26 RX ORDER — CLOZAPINE 100 MG/1
400 TABLET ORAL
Status: DISCONTINUED | OUTPATIENT
Start: 2024-09-26 | End: 2024-10-02 | Stop reason: HOSPADM

## 2024-09-26 RX ORDER — ACETAMINOPHEN 325 MG/1
650 TABLET ORAL EVERY 4 HOURS PRN
Status: DISCONTINUED | OUTPATIENT
Start: 2024-09-26 | End: 2024-10-02 | Stop reason: HOSPADM

## 2024-09-26 RX ORDER — RISPERIDONE 0.5 MG/1
0.5 TABLET ORAL 3 TIMES DAILY
Status: DISCONTINUED | OUTPATIENT
Start: 2024-09-26 | End: 2024-10-02 | Stop reason: HOSPADM

## 2024-09-26 RX ORDER — LORAZEPAM 2 MG/ML
2 INJECTION INTRAMUSCULAR
Status: DISCONTINUED | OUTPATIENT
Start: 2024-09-26 | End: 2024-10-02 | Stop reason: HOSPADM

## 2024-09-26 RX ORDER — OXYBUTYNIN CHLORIDE 5 MG/1
5 TABLET ORAL 2 TIMES DAILY
Status: DISCONTINUED | OUTPATIENT
Start: 2024-09-26 | End: 2024-10-02 | Stop reason: HOSPADM

## 2024-09-26 RX ORDER — BENZONATATE 100 MG/1
100 CAPSULE ORAL 3 TIMES DAILY PRN
Status: DISCONTINUED | OUTPATIENT
Start: 2024-09-26 | End: 2024-10-02 | Stop reason: HOSPADM

## 2024-09-26 RX ORDER — GUAIFENESIN 600 MG/1
600 TABLET, EXTENDED RELEASE ORAL EVERY 12 HOURS SCHEDULED
Status: DISCONTINUED | OUTPATIENT
Start: 2024-09-26 | End: 2024-09-28

## 2024-09-26 RX ORDER — HYDROXYZINE HYDROCHLORIDE 50 MG/1
100 TABLET, FILM COATED ORAL
Status: DISCONTINUED | OUTPATIENT
Start: 2024-09-26 | End: 2024-10-02 | Stop reason: HOSPADM

## 2024-09-26 RX ORDER — ALBUTEROL SULFATE 90 UG/1
2 INHALANT RESPIRATORY (INHALATION) EVERY 4 HOURS PRN
Status: DISCONTINUED | OUTPATIENT
Start: 2024-09-26 | End: 2024-10-02 | Stop reason: HOSPADM

## 2024-09-26 RX ORDER — MAGNESIUM HYDROXIDE/ALUMINUM HYDROXICE/SIMETHICONE 120; 1200; 1200 MG/30ML; MG/30ML; MG/30ML
30 SUSPENSION ORAL EVERY 4 HOURS PRN
Status: DISCONTINUED | OUTPATIENT
Start: 2024-09-26 | End: 2024-10-02 | Stop reason: HOSPADM

## 2024-09-26 RX ORDER — LORAZEPAM 0.5 MG/1
0.5 TABLET ORAL 2 TIMES DAILY
Status: DISCONTINUED | OUTPATIENT
Start: 2024-09-26 | End: 2024-10-02 | Stop reason: HOSPADM

## 2024-09-26 RX ORDER — NICOTINE 21 MG/24HR
21 PATCH, TRANSDERMAL 24 HOURS TRANSDERMAL DAILY
Status: DISCONTINUED | OUTPATIENT
Start: 2024-09-26 | End: 2024-10-02 | Stop reason: HOSPADM

## 2024-09-26 RX ORDER — AMOXICILLIN 250 MG
1 CAPSULE ORAL DAILY PRN
Status: DISCONTINUED | OUTPATIENT
Start: 2024-09-26 | End: 2024-10-01

## 2024-09-26 RX ORDER — POLYETHYLENE GLYCOL 3350 17 G/17G
17 POWDER, FOR SOLUTION ORAL DAILY PRN
Status: DISCONTINUED | OUTPATIENT
Start: 2024-09-26 | End: 2024-10-02 | Stop reason: HOSPADM

## 2024-09-26 RX ORDER — BISACODYL 10 MG
10 SUPPOSITORY, RECTAL RECTAL DAILY PRN
Status: DISCONTINUED | OUTPATIENT
Start: 2024-09-26 | End: 2024-10-01

## 2024-09-26 RX ORDER — ATORVASTATIN CALCIUM 10 MG/1
10 TABLET, FILM COATED ORAL
Status: DISCONTINUED | OUTPATIENT
Start: 2024-09-26 | End: 2024-10-02 | Stop reason: HOSPADM

## 2024-09-26 RX ORDER — HALOPERIDOL 0.5 MG/1
1 TABLET ORAL EVERY 6 HOURS PRN
Status: DISCONTINUED | OUTPATIENT
Start: 2024-09-26 | End: 2024-10-02 | Stop reason: HOSPADM

## 2024-09-26 RX ORDER — HYDROCORTISONE 25 MG/G
CREAM TOPICAL 4 TIMES DAILY PRN
Status: DISCONTINUED | OUTPATIENT
Start: 2024-09-26 | End: 2024-10-02 | Stop reason: HOSPADM

## 2024-09-26 RX ORDER — LANOLIN ALCOHOL/MO/W.PET/CERES
100 CREAM (GRAM) TOPICAL DAILY
Status: DISCONTINUED | OUTPATIENT
Start: 2024-09-26 | End: 2024-10-02 | Stop reason: HOSPADM

## 2024-09-26 RX ORDER — NYSTATIN 100000 [USP'U]/ML
500000 SUSPENSION ORAL 4 TIMES DAILY
Status: DISCONTINUED | OUTPATIENT
Start: 2024-09-26 | End: 2024-10-02 | Stop reason: HOSPADM

## 2024-09-26 RX ORDER — TRAZODONE HYDROCHLORIDE 50 MG/1
50 TABLET, FILM COATED ORAL
Status: DISCONTINUED | OUTPATIENT
Start: 2024-09-26 | End: 2024-10-02 | Stop reason: HOSPADM

## 2024-09-26 RX ORDER — DIPHENHYDRAMINE HYDROCHLORIDE 50 MG/ML
50 INJECTION INTRAMUSCULAR; INTRAVENOUS EVERY 6 HOURS PRN
Status: DISCONTINUED | OUTPATIENT
Start: 2024-09-26 | End: 2024-10-02 | Stop reason: HOSPADM

## 2024-09-26 RX ORDER — HYDROXYZINE HYDROCHLORIDE 25 MG/1
25 TABLET, FILM COATED ORAL
Status: DISCONTINUED | OUTPATIENT
Start: 2024-09-26 | End: 2024-10-02 | Stop reason: HOSPADM

## 2024-09-26 RX ORDER — LORAZEPAM 2 MG/ML
2 INJECTION INTRAMUSCULAR EVERY 6 HOURS PRN
Status: DISCONTINUED | OUTPATIENT
Start: 2024-09-26 | End: 2024-10-02 | Stop reason: HOSPADM

## 2024-09-26 RX ORDER — HYDROXYZINE HYDROCHLORIDE 50 MG/1
50 TABLET, FILM COATED ORAL
Status: DISCONTINUED | OUTPATIENT
Start: 2024-09-26 | End: 2024-10-02 | Stop reason: HOSPADM

## 2024-09-26 RX ORDER — VENLAFAXINE HYDROCHLORIDE 37.5 MG/1
37.5 CAPSULE, EXTENDED RELEASE ORAL DAILY
Status: DISCONTINUED | OUTPATIENT
Start: 2024-09-26 | End: 2024-10-02 | Stop reason: HOSPADM

## 2024-09-26 RX ORDER — CLOZAPINE 100 MG/1
400 TABLET ORAL
Status: DISCONTINUED | OUTPATIENT
Start: 2024-09-26 | End: 2024-09-26

## 2024-09-26 RX ORDER — FLUTICASONE PROPIONATE 50 MCG
1 SPRAY, SUSPENSION (ML) NASAL DAILY
Status: DISCONTINUED | OUTPATIENT
Start: 2024-09-26 | End: 2024-10-02 | Stop reason: HOSPADM

## 2024-09-26 RX ORDER — PROPRANOLOL HCL 10 MG
10 TABLET ORAL EVERY 8 HOURS PRN
Status: DISCONTINUED | OUTPATIENT
Start: 2024-09-26 | End: 2024-10-02 | Stop reason: HOSPADM

## 2024-09-26 RX ADMIN — LORAZEPAM 0.5 MG: 0.5 TABLET ORAL at 17:16

## 2024-09-26 RX ADMIN — LAMOTRIGINE 100 MG: 100 TABLET ORAL at 12:04

## 2024-09-26 RX ADMIN — ATORVASTATIN CALCIUM 10 MG: 10 TABLET, FILM COATED ORAL at 17:16

## 2024-09-26 RX ADMIN — Medication 500000 UNITS: at 21:18

## 2024-09-26 RX ADMIN — VENLAFAXINE HYDROCHLORIDE 37.5 MG: 37.5 CAPSULE, EXTENDED RELEASE ORAL at 12:07

## 2024-09-26 RX ADMIN — RISPERIDONE 0.5 MG: 0.5 TABLET, FILM COATED ORAL at 21:18

## 2024-09-26 RX ADMIN — CLOZAPINE 400 MG: 100 TABLET ORAL at 21:18

## 2024-09-26 RX ADMIN — OXYBUTYNIN CHLORIDE 5 MG: 5 TABLET ORAL at 12:04

## 2024-09-26 RX ADMIN — LORAZEPAM 0.5 MG: 0.5 TABLET ORAL at 12:04

## 2024-09-26 RX ADMIN — GUAIFENESIN 600 MG: 600 TABLET ORAL at 21:18

## 2024-09-26 RX ADMIN — FOLIC ACID 1 MG: 1 TABLET ORAL at 12:04

## 2024-09-26 RX ADMIN — LAMOTRIGINE 100 MG: 100 TABLET ORAL at 17:16

## 2024-09-26 RX ADMIN — THIAMINE HCL TAB 100 MG 100 MG: 100 TAB at 12:04

## 2024-09-26 RX ADMIN — PANTOPRAZOLE SODIUM 40 MG: 40 TABLET, DELAYED RELEASE ORAL at 11:12

## 2024-09-26 RX ADMIN — Medication 1 TABLET: at 12:04

## 2024-09-26 RX ADMIN — NICOTINE 21 MG: 21 PATCH, EXTENDED RELEASE TRANSDERMAL at 12:10

## 2024-09-26 RX ADMIN — RISPERIDONE 0.5 MG: 0.5 TABLET, FILM COATED ORAL at 17:16

## 2024-09-26 RX ADMIN — OXYBUTYNIN CHLORIDE 5 MG: 5 TABLET ORAL at 17:16

## 2024-09-26 RX ADMIN — ALUMINUM HYDROXIDE, MAGNESIUM HYDROXIDE, AND DIMETHICONE 30 ML: 200; 20; 200 SUSPENSION ORAL at 13:30

## 2024-09-26 RX ADMIN — GUAIFENESIN 600 MG: 600 TABLET ORAL at 12:03

## 2024-09-26 RX ADMIN — HYDROXYZINE HYDROCHLORIDE 100 MG: 50 TABLET, FILM COATED ORAL at 02:13

## 2024-09-26 RX ADMIN — Medication 1000 UNITS: at 11:12
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
## 2024-09-26 NOTE — ED NOTES
[facility] contacted [facility] ACT team and update was provided in reference to transfer to higher level of care. [facility] ACT Answering personnel could not provide name.

## 2024-09-26 NOTE — PROGRESS NOTES
09/26/24    Team Meeting   Meeting Type Tx Team Meeting   Team Members Present   Team Members Present Physician;Nurse;   Physician Team Member Didire LARSON   Nursing Team Member Leo JIMENES   Social Work Team Member Vimal BURROUGHS   Patient/Family Present   Patient Present No   Patient's Family Present No     Treatment team met with pt to review care plan including strengths, limitations, coping skills, treatment plan and goals; pt agreeable and signed; copy on chart.

## 2024-09-26 NOTE — ED NOTES
Insurance Authorization for admission:   Phone call placed to [facility].  Phone number: [phone].     Spoke to Mireya LIEBERMAN     05 days approved.(Last covered-9/30)  Level of care: Inpatient Behavioral Health (201).  Review on 9/30.   Authorization # [id number].        Eligibility Verification System checked - ([phone]).  Online system / automated system indicates:     [id number]25/202409/25/2024  [facility]09/25/202409/25/2024    Insurance Authorization for Transportation:    Phone call placed to **.   Phone number **.   Spoke to **.   Authorization #: **

## 2024-09-26 NOTE — ED NOTES
Pt remains calm and cooperative in room. Pt currently denies any issues at this time. Food tray given     Roqeu Jane RN  09/25/24 [id number]

## 2024-09-26 NOTE — NURSING NOTE
Patient reports feeling anxious r/t being inpatient. Utilized prn Atarax 100 mg for jerry anxiety score of 25. Will monitor and follow up for effectiveness.

## 2024-09-26 NOTE — ED NOTES
Patient is accepted at [facility].  Patient is accepted by Dr. Matthew Garcia in Intake.     Transportation is arranged with [facility].     Transportation is scheduled for 0030 with [facility].   Patient may go to the floor after 2330.          *Nurse report is to be called to [phone] prior to patient transfer.*        Almaz Gallardo, JORGE  09/25/24    [id number]

## 2024-09-26 NOTE — PROGRESS NOTES
Completed her admission self assessment. She is struggling with her mental health and feels as if others stalk and break into her apartment.She has things she enjoys and she would like to learn everything offered.

## 2024-09-26 NOTE — TREATMENT PLAN
TREATMENT PLAN REVIEW - Behavioral Health Jo-Ann Lamb 54 y.o. 1970 female MRN: [id number]    [facility] BEHAVIORAL HEALTH Room / Bed: [facility] 245/[facility] 245-02 Encounter: [id number]          Admit Date/Time:  9/26/2024  1:44 AM    Treatment Team:   MD Kylie Adams, YUDY Justice    Diagnosis: Principal Problem:    Schizoaffective disorder, bipolar type ([facility])      Patient Strengths/Assets: compliant with medication, negotiates basic needs, patient is on a voluntary commitment    Patient Barriers/Limitations: substance abuse, chronic mental illness    Short Term Goals: decrease in depressive symptoms, decrease in anxiety symptoms, decrease in paranoid thoughts, decrease in suicidal thoughts, ability to stay safe on the unit, sleep improvement, mood stabilization    Long Term Goals: improvement in anxiety, resolution of depressive symptoms, free of suicidal thoughts, improvement in reality testing    Progress Towards Goals: starting psychiatric medications as prescribed, continue psychiatric medications as prescribed    Recommended Treatment: medication management, patient medication education, group therapy, milieu therapy, continued Behavioral Health psychiatric evaluation/assessment process    Treatment Frequency: daily medication monitoring, group and milieu therapy daily, monitoring through interdisciplinary rounds, monitoring through weekly patient care conferences    Expected Discharge Date: 10/10/2024    Discharge Plan: referral for outpatient drug and alcohol counseling, referrals as indicated    Treatment Plan Created/Updated By: Haris Mariee MD

## 2024-09-26 NOTE — NURSING NOTE
"Patient cooperative with staff; labile and has an irritable edge. Denies SI/HI or AVH. Patient reports at baseline she has AH, however patient currently reports no AH. Patient is paranoid of environment, reports increased anxiety related to medication changes. Patient stated, \"I do not feel right, something is not right here. I feel it.\" Patient was educated on scheduled medications and reassured she is safe. Patient is med compliant, visible on the unit and selectively social with peers; did not attended groups. Will continue to monitor. Continual q7 min rounding and safety checks in place.   "

## 2024-09-26 NOTE — ASSESSMENT & PLAN NOTE
Severe depression and suicidal ideation in the context of alcohol use.  Delusions of persecution.  Hx of several inpatient psych admissions.  Lives at [facility].  Has ACT team.  Came on Clozaril total daily dose of 400 mg PO daily (50 mg PO daily in the AM, and 350 mg PO daily at bedtime).   Has ANC checked on a monthly basis.      Plan:  Continue prior to admission Clozaril at the dose of 400 mg PO at bedtime.  Continue prior to admission Risperdal 1 mg PO TID.  Continue prior to admission Lamictal 100 mg PO BID  Continue prior to admisson Ativan 0.5 mg PO BID.  Start Effexor-XR 37.5 mg PO daily.  All current active meds have been reviewed.  Medical management per medical team.  Encourage group therapy, milieu therapy and occupational therapy  Behavioral Health checks every 15 minutes    Workup:  - CBC w diff,   - EKG 12 lead (QTC),   - BNP,   - Troponin,   - CRP,   - CK.
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
## 2024-09-26 NOTE — ED NOTES
CIS met with Patient, introduced self and conducted respective assessment and safety risk. Patient is a 54 years old single female and mother of two children presenting to the [facility] ED due to increased symptoms of depression and passive suicidal ideations. Patient reported having long history of depression, schizoaffective disorder, bipolar type. Patient also reported history of multiple inpatient hospitalizations and suicidal attempts. Patient reported that she has been hearing voices telling her to kill herself. Patient denied any homicidal ideations or delusions at this time. Patient stated that she sees her psychiatrist Dr. Ortez on a monthly basis but she feels that her current psychiatric medications. Lamictal, Risperdal ans Ativan are not helping. Patient reported having medical problems including pancreas, gallbladder and back pain and she does not follow up medical treatment. Patient disclosed smoking 1/2 pack of cigarettes daily and drinking 2-3 shots of alcohol twice a week. Patient denied any drug use. Patient reported that she lives in an apartment by herself and does not have access to weapons. Patient noted that she is unemployed and on Disability and has some College education. CIS assisted Patient reviewing current mental health condition and she agreed to inpatient treatment on a 201 basis. ED provider will be notified.
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
## 2024-09-26 NOTE — ED NOTES
[facility]    ID# [id number]    [id number] 09/25/2024 09/25/2024  [facility] 09/25/2024 09/25/2024

## 2024-09-26 NOTE — NURSING NOTE
Prn Atarax given prior noted to be effective evidenced by patient noted to be resting comfortably in bed at this time. No signs of discomfort/distress noted.

## 2024-09-26 NOTE — EMTALA/ACUTE CARE TRANSFER
[facility] EMERGENCY DEPARTMENT  [address] [place] [address]  Dept: [phone]      EMTALA TRANSFER CONSENT    NAME Jo-Ann SHARIF 1970                              MRN [id number]    I have been informed of my rights regarding examination, treatment, and transfer   by Dr. Paresh Pham MD    Benefits: Specialized equipment and/or services available at the receiving facility (Include comment)________________________    Risks: Potential for delay in receiving treatment, Potential deterioration of medical condition, Loss of IV, Increased discomfort during transfer, Possible worsening of condition or death during transfer      Transfer Request   I acknowledge that my medical condition has been evaluated and explained to me by the emergency department physician or other qualified medical person and/or my attending physician who has recommended and offered to me further medical examination and treatment. I understand the Hospital's obligation with respect to the treatment and stabilization of my emergency medical condition. I nevertheless request to be transferred. I release the Hospital, the doctor, and any other persons caring for me from all responsibility or liability for any injury or ill effects that may result from my transfer and agree to accept all responsibility for the consequences of my choice to transfer, rather than receive stabilizing treatment at the Hospital. I understand that because the transfer is my request, my insurance may not provide reimbursement for the services.  The Hospital will assist and direct me and my family in how to make arrangements for transfer, but the hospital is not liable for any fees charged by the transport service.  In spite of this understanding, I refuse to consent to further medical examination and treatment which has been offered to me, and request transfer to Accepting Facility Name, City &  State : [facility]. I authorize the performance of emergency medical procedures and treatments upon me in both transit and upon arrival at the receiving facility.  Additionally, I authorize the release of any and all medical records to the receiving facility and request they be transported with me, if possible.    I authorize the performance of emergency medical procedures and treatments upon me in both transit and upon arrival at the receiving facility.  Additionally, I authorize the release of any and all medical records to the receiving facility and request they be transported with me, if possible.  I understand that the safest mode of transportation during a medical emergency is an ambulance and that the Hospital advocates the use of this mode of transport. Risks of traveling to the receiving facility by car, including absence of medical control, life sustaining equipment, such as oxygen, and medical personnel has been explained to me and I fully understand them.    (OSEI CORRECT BOX BELOW)  [  ]  I consent to the stated transfer and to be transported by ambulance/helicopter.  [  ]  I consent to the stated transfer, but refuse transportation by ambulance and accept full responsibility for my transportation by car.  I understand the risks of non-ambulance transfers and I exonerate the Hospital and its staff from any deterioration in my condition that results from this refusal.    X___________________________________________    DATE  24  TIME________  Signature of patient or legally responsible individual signing on patient behalf           RELATIONSHIP TO PATIENT_________________________          Provider Certification    NAME Jo-Ann Lamb                                         1970                              MRN [id number]    A medical screening exam was performed on the above named patient.  Based on the examination:    Condition Necessitating Transfer The primary encounter diagnosis was  Suicidal ideations. A diagnosis of Depression was also pertinent to this visit.    Patient Condition: The patient has been stabilized such that within reasonable medical probability, no material deterioration of the patient condition or the condition of the unborn child(yeni) is likely to result from the transfer    Reason for Transfer: Level of Care needed not available at this facility    Transfer Requirements: Facility -[facility]   Space available and qualified personnel available for treatment as acknowledged by Almaz Gallardo, LCSW  Agreed to accept transfer and to provide appropriate medical treatment as acknowledged by       Dr. Castro  Appropriate medical records of the examination and treatment of the patient are provided at the time of transfer   STAFF INITIAL WHEN COMPLETED _______  Transfer will be performed by qualified personnel from [facility]  and appropriate transfer equipment as required, including the use of necessary and appropriate life support measures.    Provider Certification: I have examined the patient and explained the following risks and benefits of being transferred/refusing transfer to the patient/family:  General risk, such as traffic hazards, adverse weather conditions, rough terrain or turbulence, possible failure of equipment (including vehicle or aircraft), or consequences of actions of persons outside the control of the transport personnel, Consent was not obtained as patient is committed to psychiatric facility and transfer is mandated, The patient is stable for psychiatric transfer because they are medically stable, and is protected from harming him/herself or others during transport, Unanticipated needs of medical equipment and personnel during transport, Risk of worsening condition      Based on these reasonable risks and benefits to the patient and/or the unborn child(yeni), and based upon the information available at the time of the patient’s examination, I certify that  the medical benefits reasonably to be expected from the provision of appropriate medical treatments at another medical facility outweigh the increasing risks, if any, to the individual’s medical condition, and in the case of labor to the unborn child, from effecting the transfer.    X____________________________________________ DATE 09/26/24        TIME_______      ORIGINAL - SEND TO MEDICAL RECORDS   COPY - SEND WITH PATIENT DURING TRANSFER

## 2024-09-26 NOTE — PROGRESS NOTES
09/26/24    Team Meeting   Meeting Type Daily Rounds   Team Members Present   Team Members Present Physician;Nurse;;Occupational Therapist   Physician Team Member Ronald LARSON, Didier LARSON, Jaylene LARSON, Yair COLEMAN   Nursing Team Member Leo JIMENES   Social Work Team Member Vimal BURROUGHS   OT Team Member Pope BETTYE   Patient/Family Present   Patient Present No   Patient's Family Present No     New 201, increased dep, SI to OD, hx of Ips, UDS -, flat, minimal, cooperative, mod anx/dep, slept, involved ACT team

## 2024-09-27 LAB
ATRIAL RATE: 96 BPM
BASOPHILS # BLD AUTO: 0.05 THOUSANDS/ΜL (ref 0–0.1)
BASOPHILS NFR BLD AUTO: 1 % (ref 0–1)
BNP SERPL-MCNC: 27 PG/ML (ref 0–100)
CARDIAC TROPONIN I PNL SERPL HS: 3 NG/L (ref 8–18)
CK SERPL-CCNC: 55 U/L (ref 26–192)
EOSINOPHIL # BLD AUTO: 0.31 THOUSAND/ΜL (ref 0–0.61)
EOSINOPHIL NFR BLD AUTO: 4 % (ref 0–6)
ERYTHROCYTE [DISTWIDTH] IN BLOOD BY AUTOMATED COUNT: 14.6 % (ref 11.6–15.1)
HCT VFR BLD AUTO: 45.6 % (ref 34.8–46.1)
HGB BLD-MCNC: 14.3 G/DL (ref 11.5–15.4)
IMM GRANULOCYTES # BLD AUTO: 0.1 THOUSAND/UL (ref 0–0.2)
IMM GRANULOCYTES NFR BLD AUTO: 1 % (ref 0–2)
LYMPHOCYTES # BLD AUTO: 1.9 THOUSANDS/ΜL (ref 0.6–4.47)
LYMPHOCYTES NFR BLD AUTO: 22 % (ref 14–44)
MCH RBC QN AUTO: 28.7 PG (ref 26.8–34.3)
MCHC RBC AUTO-ENTMCNC: 31.4 G/DL (ref 31.4–37.4)
MCV RBC AUTO: 91 FL (ref 82–98)
MONOCYTES # BLD AUTO: 0.68 THOUSAND/ΜL (ref 0.17–1.22)
MONOCYTES NFR BLD AUTO: 8 % (ref 4–12)
NEUTROPHILS # BLD AUTO: 5.61 THOUSANDS/ΜL (ref 1.85–7.62)
NEUTS SEG NFR BLD AUTO: 64 % (ref 43–75)
NRBC BLD AUTO-RTO: 0 /100 WBCS
P AXIS: 58 DEGREES
PLATELET # BLD AUTO: 259 THOUSANDS/UL (ref 149–390)
PMV BLD AUTO: 9.3 FL (ref 8.9–12.7)
PR INTERVAL: 152 MS
QRS AXIS: -4 DEGREES
QRSD INTERVAL: 74 MS
QT INTERVAL: 378 MS
QTC INTERVAL: 477 MS
RBC # BLD AUTO: 4.99 MILLION/UL (ref 3.81–5.12)
T WAVE AXIS: 41 DEGREES
VENTRICULAR RATE: 96 BPM
WBC # BLD AUTO: 8.65 THOUSAND/UL (ref 4.31–10.16)

## 2024-09-27 PROCEDURE — 85025 COMPLETE CBC W/AUTO DIFF WBC: CPT

## 2024-09-27 PROCEDURE — 83880 ASSAY OF NATRIURETIC PEPTIDE: CPT

## 2024-09-27 PROCEDURE — 93010 ELECTROCARDIOGRAM REPORT: CPT | Performed by: INTERNAL MEDICINE

## 2024-09-27 PROCEDURE — 82550 ASSAY OF CK (CPK): CPT

## 2024-09-27 PROCEDURE — 99232 SBSQ HOSP IP/OBS MODERATE 35: CPT | Performed by: NURSE PRACTITIONER

## 2024-09-27 PROCEDURE — 84484 ASSAY OF TROPONIN QUANT: CPT

## 2024-09-27 RX ORDER — METHOCARBAMOL 500 MG/1
500 TABLET, FILM COATED ORAL EVERY 6 HOURS PRN
Status: DISCONTINUED | OUTPATIENT
Start: 2024-09-27 | End: 2024-10-02 | Stop reason: HOSPADM

## 2024-09-27 RX ORDER — HYDROCORTISONE ACETATE 25 MG/1
25 SUPPOSITORY RECTAL 2 TIMES DAILY
Status: DISCONTINUED | OUTPATIENT
Start: 2024-09-27 | End: 2024-09-30

## 2024-09-27 RX ADMIN — PANTOPRAZOLE SODIUM 40 MG: 40 TABLET, DELAYED RELEASE ORAL at 08:04

## 2024-09-27 RX ADMIN — Medication 3 MG: at 20:59

## 2024-09-27 RX ADMIN — SENNOSIDES AND DOCUSATE SODIUM 1 TABLET: 8.6; 5 TABLET ORAL at 10:34

## 2024-09-27 RX ADMIN — RISPERIDONE 0.5 MG: 0.5 TABLET, FILM COATED ORAL at 20:59

## 2024-09-27 RX ADMIN — LAMOTRIGINE 100 MG: 100 TABLET ORAL at 17:01

## 2024-09-27 RX ADMIN — OXYBUTYNIN CHLORIDE 5 MG: 5 TABLET ORAL at 17:01

## 2024-09-27 RX ADMIN — NICOTINE 21 MG: 21 PATCH, EXTENDED RELEASE TRANSDERMAL at 10:08

## 2024-09-27 RX ADMIN — GUAIFENESIN 600 MG: 600 TABLET ORAL at 08:04

## 2024-09-27 RX ADMIN — HYDROCORTISONE ACETATE 25 MG: 25 SUPPOSITORY RECTAL at 12:42

## 2024-09-27 RX ADMIN — CLOZAPINE 400 MG: 100 TABLET ORAL at 20:59

## 2024-09-27 RX ADMIN — OXYBUTYNIN CHLORIDE 5 MG: 5 TABLET ORAL at 08:04

## 2024-09-27 RX ADMIN — METHOCARBAMOL TABLETS 500 MG: 500 TABLET, COATED ORAL at 15:28

## 2024-09-27 RX ADMIN — ACETAMINOPHEN 975 MG: 325 TABLET ORAL at 15:51

## 2024-09-27 RX ADMIN — Medication 1000 UNITS: at 08:04

## 2024-09-27 RX ADMIN — FOLIC ACID 1 MG: 1 TABLET ORAL at 08:04

## 2024-09-27 RX ADMIN — RISPERIDONE 0.5 MG: 0.5 TABLET, FILM COATED ORAL at 08:03

## 2024-09-27 RX ADMIN — LORAZEPAM 0.5 MG: 0.5 TABLET ORAL at 15:51

## 2024-09-27 RX ADMIN — FLUTICASONE FUROATE AND VILANTEROL TRIFENATATE 1 PUFF: 100; 25 POWDER RESPIRATORY (INHALATION) at 15:56

## 2024-09-27 RX ADMIN — LAMOTRIGINE 100 MG: 100 TABLET ORAL at 08:04

## 2024-09-27 RX ADMIN — RISPERIDONE 0.5 MG: 0.5 TABLET, FILM COATED ORAL at 15:30

## 2024-09-27 RX ADMIN — Medication 1 TABLET: at 08:04

## 2024-09-27 RX ADMIN — Medication 500000 UNITS: at 15:57

## 2024-09-27 RX ADMIN — THIAMINE HCL TAB 100 MG 100 MG: 100 TAB at 08:04

## 2024-09-27 RX ADMIN — ATORVASTATIN CALCIUM 10 MG: 10 TABLET, FILM COATED ORAL at 15:30

## 2024-09-27 RX ADMIN — HYDROCORTISONE: 25 CREAM TOPICAL at 14:55

## 2024-09-27 RX ADMIN — VENLAFAXINE HYDROCHLORIDE 37.5 MG: 37.5 CAPSULE, EXTENDED RELEASE ORAL at 08:04

## 2024-09-27 RX ADMIN — ALUMINUM HYDROXIDE, MAGNESIUM HYDROXIDE, AND DIMETHICONE 30 ML: 200; 20; 200 SUSPENSION ORAL at 12:42

## 2024-09-27 RX ADMIN — GUAIFENESIN 600 MG: 600 TABLET ORAL at 20:59

## 2024-09-27 RX ADMIN — LORAZEPAM 0.5 MG: 0.5 TABLET ORAL at 08:04

## 2024-09-27 NOTE — NURSING NOTE
"Patient is withdrawn to room and sleeping for majority of the night, only visible to take medications. Patient did not question medications, only refused scheduled melatonin. She is very minimal with her responses, shaking her head yes or no to answer questions. She stated she is \"okay\". Patient denies current SI/HI and hallucinations. Patient did not provide information AH while at baseline. She appears guarded and paranoid. Patient is able to make her needs known. Continuous q7 minute checks ongoing.  "

## 2024-09-27 NOTE — NURSING NOTE
"Patient irritable and yelling out from room. Responding verbally to internal stimuli, \"Fat pig! These people are going to pay, they are hurting me!\" Patient complaining of severe back pain 9/10 on 0-10 pain scale. Patient received Robaxin 500 mg and Tylenol 975 mg. Scheduled Ativan administered at 1551 and Risperdal 0.5 mg at 1530. Will reassess.   "

## 2024-09-27 NOTE — PROGRESS NOTES
09/27/24    Team Meeting   Meeting Type Daily Rounds   Team Members Present   Team Members Present Physician;Nurse;;Occupational Therapist   Physician Team Member Ronald LARSON, Yair COLEMAN   Nursing Team Member Mirella JIMENES   Social Work Team Member Vimal BURROUGHS   OT Team Member Pope BETTYE   Patient/Family Present   Patient Present No   Patient's Family Present No     201, struggling with AVH, not yelling out at night, no dc date due to med management

## 2024-09-27 NOTE — NURSING NOTE
Patient is cooperative with staff, but has irritable edge. Denies SI/HI or AVH. Reports not anxiety or depression. Patient bowel focused and somatically preoccupied. Senokot and hydrocortisone suppository given; patient reports having bowel movement. Patient intrusive at nurses station, but redirectable. Patient intermittently visible on the unit, but withdrawn to self. Attended groups. Patient med compliant and making needs known. Will continue to monitor. Continual q7 min rounding and safety checks in place.

## 2024-09-27 NOTE — NURSING NOTE
Patient reports Tylenol 975 mg and Robaxin ineffective. Still experiencing pain. Patient encouraged to rest and given hot pack to help with pain. Will continue to monitor.

## 2024-09-27 NOTE — PROGRESS NOTES
Progress Note - Jo-Ann Lamb 54 y.o. female MRN: [id number]    Unit/Bed#: [facility] 245-02 Encounter: [id number]        Subjective:   Patient seen and examined at bedside after reviewing the chart and discussing the case with the caring staff.      Patient examined at bedside.  Patient is requesting Robaxin for back pain.     Physical Exam   Vitals: Blood pressure 121/80, pulse 97, temperature 97.8 °F (36.6 °C), temperature source Temporal, resp. rate 17, height 5' (1.524 m), weight 67 kg (147 lb 12.8 oz), last menstrual period 01/01/2020, SpO2 94%, not currently breastfeeding.,Body mass index is 28.87 kg/m².  Constitutional: Patient in no acute distress.  HEENT: PERR, EOMI, MMM.  Cardiovascular: Normal rate and regular rhythm.    Pulmonary/Chest: Effort normal and breath sounds normal.   Abdomen: Soft, + BS, NT.    Assessment/Plan:  Jo-Ann Lamb is a(n) 54 y.o. female with schizoaffective disorder.     Hyperlipidemia.  Tot chol 209, Triglyc 362.  Restart atorvastatin 10 mg daily.  Asthma.  Patient is on Breo Ellipta daily (Advair nonform), albuterol inhaler as needed.   Allergic rhinitis.  Continue Flonase nasal spray daily.   GERD.  Patient on Protonix 40 mg daily.   Tobacco abuse.  NRT.   Alcohol abuse.  Patient started on thiamine, folic acid, MVI.   Vitamin D deficiency.  Continue vitamin D3 1000 units daily.  Urge incontinence.  Trial oxybutynin 5 mg twice daily.   Constipation/hemorrhoids.  Continue senokot S and Miralax as needed.  Apply Anusol-HC as needed.   Cough/congestion.  Start Mucinex twice daily.  Tessalon perles as needed.  Chronic lower back pain.  Tylenol or Robaxin as needed.     The patient was discussed with Dr. Steel and he is in agreement with the above note.

## 2024-09-27 NOTE — PROGRESS NOTES
Progress Note - Behavioral Health   Name: Jo-Ann Lamb 54 y.o. female I MRN: [id number]  Unit/Bed#: -02 I Date of Admission: 9/26/2024   Date of Service: 9/27/2024 I Hospital Day: 1    Assessment & Plan  Schizoaffective disorder, bipolar type (HCC)  Severe depression and suicidal ideation in the context of alcohol use.  Delusions of persecution.  Hx of several inpatient psych admissions.  Lives at [facility].  Has ACT team.  Came on Clozaril total daily dose of 400 mg PO daily (50 mg PO daily in the AM, and 350 mg PO daily at bedtime).   Has ANC checked on a monthly basis.    Plan:  Continue prior to admission Clozaril at the dose of 400 mg PO at bedtime.  Continue prior to admission Risperdal 0.5 mg PO TID.  Continue prior to admission Lamictal 100 mg PO BID  Continue prior to admisson Ativan 0.5 mg PO BID.  Continue  Effexor-XR 37.5 mg PO daily; started 9/26/24  All current active meds have been reviewed.  Medical management per medical team.  Encourage group therapy, milieu therapy and occupational therapy  Behavioral Health checks every 15 minutes  Utilizing 2 antipsychotics at this time for augmentation of Clozaril therapy.      Progress Toward Goals: Some improvement.  Reports decrease anxiety/depression.  Tolerating Effexor 37.5 mg well with no adverse effects. CBCD and cardiac labs obtained today and within normal limits.  Continue monthly CBCD with Clozaril therapy.  No medication changes indicated for today.  No discharge date at this time.    Recommended Treatment: Continue with group therapy, milieu therapy and occupational therapy.      Risks, benefits and possible side effects of Medications:   Risks, benefits, and possible side effects of medications explained to patient and patient verbalizes understanding.      History of Present Illness   Behavior over the last 24 hours:  improved  Sleep: normal  Appetite: normal  Medication side effects: No  ROS: no complaints and  "all other systems are negative    Subjective: Jo-Ann has been visible in the milieu and engaged in unit activities.  Adjusting well to unit.  Reports improving anxiety and depression with no return of SI.  AH persists and noncommanding in nature; further described as chronic and not bothersome at the moment.  Thoughts are circumstantial with less negative thinking..  Verbalized satisfaction with initiation of Effexor.  Able to contract for safety should suicidal thoughts return.    Objective   Mental Status Evaluation:  Appearance:  disheveled and wearing pajamas, unkempt hair   Behavior:  Pleasant, cooperative   Speech:  normal pitch and normal volume   Mood:  \"Better,\" less anxious, less depressed   Affect:  constricted   Thought Process:  circumstantial   Associations: circumstantial associations   Thought Content:  No overt delusions or paranoia verbalized, less negative thinking   Perceptual Disturbances: Chronic AH, noncommanding in nature, \"they don't bother me at all now. They are much better.\"    Risk Potential: Suicidal Ideations none at present  Homicidal Ideations none  Potential for Aggression No   Sensorium:  person, place, time/date, and situation   Memory:  recent and remote memory grossly intact   Consciousness:  alert and awake    Attention: attention span appeared shorter than expected for age   Insight:  limited   Judgment: limited   Gait/Station: normal gait/station and normal balance   Motor Activity: no abnormal movements     Medications: all current active meds have been reviewed and continue current psychiatric medications.      Lab Results: I have reviewed the following results: Drug Screen:   Results from last 7 days   Lab Units 09/25/24  1419   BARBITURATE UR  Negative   BENZODIAZEPINE UR  Negative   THC UR  Negative   COCAINE UR  Negative   METHADONE URINE  Negative   OPIATE UR  Negative   PCP UR  Negative     CBC:   Lab Results   Component Value Date    WBC 8.65 09/27/2024    RBC 4.99 " 09/27/2024    HGB 14.3 09/27/2024    HCT 45.6 09/27/2024    MCV 91 09/27/2024     09/27/2024    MCH 28.7 09/27/2024    MCHC 31.4 09/27/2024    RDW 14.6 09/27/2024    MPV 9.3 09/27/2024    NEUTROABS 5.61 09/27/2024     Clozapine:   Lab Results   Component Value Date    CLOZAPINE 867 04/04/2024    NCLOZIP 241 11/29/2023       Administrative Statements   I have spent a total time of 30 minutes in caring for this patient on the day of the visit/encounter including medication education, treatment plan, safety planning, supportive therapy.
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
## 2024-09-27 NOTE — NURSING NOTE
Patient slept uninterrupted throughout the night without signs or symptoms of distress. Non labored breathing observed. Continuous q7 minute checks ongoing.

## 2024-09-27 NOTE — ASSESSMENT & PLAN NOTE
Severe depression and suicidal ideation in the context of alcohol use.  Delusions of persecution.  Hx of several inpatient psych admissions.  Lives at [facility].  Has ACT team.  Came on Clozaril total daily dose of 400 mg PO daily (50 mg PO daily in the AM, and 350 mg PO daily at bedtime).   Has ANC checked on a monthly basis.    Plan:  Continue prior to admission Clozaril at the dose of 400 mg PO at bedtime.  Continue prior to admission Risperdal 0.5 mg PO TID.  Continue prior to admission Lamictal 100 mg PO BID  Continue prior to admisson Ativan 0.5 mg PO BID.  Continue  Effexor-XR 37.5 mg PO daily; started 9/26/24  All current active meds have been reviewed.  Medical management per medical team.  Encourage group therapy, milieu therapy and occupational therapy  Behavioral Health checks every 15 minutes  Utilizing 2 antipsychotics at this time for augmentation of Clozaril therapy.

## 2024-09-27 NOTE — PLAN OF CARE
Problem: Depression  Goal: Attend and participate in unit activities, including therapeutic, recreational, and educational groups  Description: Interventions:  - Provide therapeutic and educational activities daily, encourage attendance and participation, and document same in the medical record   Outcome: Progressing     Problem: Ineffective Coping  Goal: Participates in unit activities  Description: Interventions:  - Provide therapeutic environment   - Provide required programming   - Redirect inappropriate behaviors   Outcome: Progressing   Jo-Ann is a new patient who is struggling with her symptoms, she will be encouraged to engage in groups.

## 2024-09-28 PROCEDURE — 99232 SBSQ HOSP IP/OBS MODERATE 35: CPT

## 2024-09-28 RX ORDER — LIDOCAINE 50 MG/G
1 PATCH TOPICAL DAILY
Status: DISCONTINUED | OUTPATIENT
Start: 2024-09-28 | End: 2024-10-02 | Stop reason: HOSPADM

## 2024-09-28 RX ORDER — IBUPROFEN 600 MG/1
600 TABLET, FILM COATED ORAL EVERY 6 HOURS PRN
Status: DISCONTINUED | OUTPATIENT
Start: 2024-09-28 | End: 2024-10-02 | Stop reason: HOSPADM

## 2024-09-28 RX ORDER — GUAIFENESIN 600 MG/1
1200 TABLET, EXTENDED RELEASE ORAL EVERY 12 HOURS SCHEDULED
Status: DISCONTINUED | OUTPATIENT
Start: 2024-09-28 | End: 2024-10-02 | Stop reason: HOSPADM

## 2024-09-28 RX ORDER — ACETAMINOPHEN 325 MG/1
975 TABLET ORAL EVERY 6 HOURS PRN
Status: DISCONTINUED | OUTPATIENT
Start: 2024-09-28 | End: 2024-10-02 | Stop reason: HOSPADM

## 2024-09-28 RX ADMIN — METHOCARBAMOL TABLETS 500 MG: 500 TABLET, COATED ORAL at 03:21

## 2024-09-28 RX ADMIN — HYDROCORTISONE ACETATE 25 MG: 25 SUPPOSITORY RECTAL at 09:07

## 2024-09-28 RX ADMIN — RISPERIDONE 0.5 MG: 0.5 TABLET, FILM COATED ORAL at 09:07

## 2024-09-28 RX ADMIN — LIDOCAINE 1 PATCH: 50 PATCH CUTANEOUS at 14:25

## 2024-09-28 RX ADMIN — LORAZEPAM 0.5 MG: 0.5 TABLET ORAL at 09:07

## 2024-09-28 RX ADMIN — LORAZEPAM 0.5 MG: 0.5 TABLET ORAL at 16:20

## 2024-09-28 RX ADMIN — PHENOL 1 SPRAY: 1.5 LIQUID ORAL at 16:20

## 2024-09-28 RX ADMIN — PANTOPRAZOLE SODIUM 40 MG: 40 TABLET, DELAYED RELEASE ORAL at 09:07

## 2024-09-28 RX ADMIN — ACETAMINOPHEN 975 MG: 325 TABLET ORAL at 10:27

## 2024-09-28 RX ADMIN — Medication 500000 UNITS: at 17:47

## 2024-09-28 RX ADMIN — Medication 1000 UNITS: at 09:07

## 2024-09-28 RX ADMIN — GUAIFENESIN 1200 MG: 600 TABLET ORAL at 21:19

## 2024-09-28 RX ADMIN — Medication 3 MG: at 21:19

## 2024-09-28 RX ADMIN — FLUTICASONE PROPIONATE 1 SPRAY: 50 SPRAY, METERED NASAL at 09:07

## 2024-09-28 RX ADMIN — OXYBUTYNIN CHLORIDE 5 MG: 5 TABLET ORAL at 09:07

## 2024-09-28 RX ADMIN — ACETAMINOPHEN 975 MG: 325 TABLET ORAL at 03:20

## 2024-09-28 RX ADMIN — FLUTICASONE FUROATE AND VILANTEROL TRIFENATATE 1 PUFF: 100; 25 POWDER RESPIRATORY (INHALATION) at 09:07

## 2024-09-28 RX ADMIN — LAMOTRIGINE 100 MG: 100 TABLET ORAL at 16:20

## 2024-09-28 RX ADMIN — OXYBUTYNIN CHLORIDE 5 MG: 5 TABLET ORAL at 16:20

## 2024-09-28 RX ADMIN — HYDROCORTISONE 1 APPLICATION: 25 CREAM TOPICAL at 17:04

## 2024-09-28 RX ADMIN — RISPERIDONE 0.5 MG: 0.5 TABLET, FILM COATED ORAL at 21:19

## 2024-09-28 RX ADMIN — METHOCARBAMOL TABLETS 500 MG: 500 TABLET, COATED ORAL at 16:20

## 2024-09-28 RX ADMIN — HYDROCORTISONE: 25 CREAM TOPICAL at 09:14

## 2024-09-28 RX ADMIN — IBUPROFEN 600 MG: 600 TABLET, FILM COATED ORAL at 16:20

## 2024-09-28 RX ADMIN — VENLAFAXINE HYDROCHLORIDE 37.5 MG: 37.5 CAPSULE, EXTENDED RELEASE ORAL at 09:07

## 2024-09-28 RX ADMIN — RISPERIDONE 0.5 MG: 0.5 TABLET, FILM COATED ORAL at 16:21

## 2024-09-28 RX ADMIN — Medication 1 TABLET: at 09:07

## 2024-09-28 RX ADMIN — Medication 500000 UNITS: at 13:32

## 2024-09-28 RX ADMIN — GUAIFENESIN 600 MG: 600 TABLET ORAL at 09:07

## 2024-09-28 RX ADMIN — NICOTINE 21 MG: 21 PATCH, EXTENDED RELEASE TRANSDERMAL at 09:07

## 2024-09-28 RX ADMIN — CLOZAPINE 400 MG: 100 TABLET ORAL at 21:19

## 2024-09-28 RX ADMIN — FOLIC ACID 1 MG: 1 TABLET ORAL at 09:07

## 2024-09-28 RX ADMIN — ATORVASTATIN CALCIUM 10 MG: 10 TABLET, FILM COATED ORAL at 16:20

## 2024-09-28 RX ADMIN — THIAMINE HCL TAB 100 MG 100 MG: 100 TAB at 09:07

## 2024-09-28 RX ADMIN — LAMOTRIGINE 100 MG: 100 TABLET ORAL at 09:07

## 2024-09-28 RX ADMIN — METHOCARBAMOL TABLETS 500 MG: 500 TABLET, COATED ORAL at 10:27

## 2024-09-28 RX ADMIN — BENZTROPINE MESYLATE 1 MG: 1 TABLET ORAL at 17:04

## 2024-09-28 RX ADMIN — Medication 500000 UNITS: at 09:07

## 2024-09-28 NOTE — PROGRESS NOTES
Progress Note - Behavioral Health   Name: Jo-Ann Lamb 54 y.o. female I MRN: [id number]  Unit/Bed#: -02 I Date of Admission: 9/26/2024   Date of Service: 9/28/2024 I Hospital Day: 2     Assessment & Plan  Schizoaffective disorder, bipolar type (HCC)  Severe depression and suicidal ideation in the context of alcohol use.  Delusions of persecution.  Hx of several inpatient psych admissions.  Lives at [facility]. Has ACT team.  Came on Clozaril total daily dose of 400 mg PO daily (50 mg PO daily in the AM, and 350 mg PO daily at bedtime).   Continue Clozaril 400mg PO qHS, with continued Clozaril monitoring parameters.  Has ANC checked on a monthly basis.  ANC from 09/27/2024 5.61.  ECG on 09/26/2024 Normal sinus rhythm, no significant change from last, and Qtc interval 477ms.    Plan:  Continue Clozaril 400mg PO qHS.  Continue  Risperdal 0.5 mg PO TID.  Continue Lamictal 100 mg PO BID  Continue  Ativan 0.5 mg PO BID.  Continue Effexor-XR 37.5 mg PO daily; started 9/26/24  All current active meds have been reviewed.  Medical management per medical team.  Continue to encourage group therapy, milieu therapy and occupational therapy  Behavioral Health checks every 15 minutes  Utilizing 2 antipsychotics at this time for augmentation of Clozaril therapy.      Planned medication and treatment changes:    Continue treatment with group therapy, milieu therapy and occupational therapy  Medical management per SLIM.  Continue current medications:  Progress Note - Behavioral Health     Jo-Ann Lamb 54 y.o. female MRN: [id number]   Unit/Bed#: -02 Encounter: [id number]    Behavior over the last 24 hours: unchanged.     Jo-Ann CUMMINGS is seen today for psychiatric follow up. Per nursing notes, patient cooperative with staff, however irritable as she noted.  Denies anxiety depression, SI and HI.  Intrusive at nurses station at times.  Intermittently visible, attends some groups.  Noted to respond to  internal stimuli and complaining of severe back pain. No psych prns in last 24 hours.    Today Jo-Ann remains fixated on her back pain, however per nursing patient is on the medical board for assistance with pain medication. She is calm and cooperative in conversation today.  She denies depression, and reports mild anxiety that is situational.  She continues to endorse auditory hallucinations of voices talking, denies command hallucinations.  Denies visual hallucinations and does not appear to be responding to internal stimuli at time of assessment. She reports these voices just say random things, denies any suicidal or homicidal content.  Notes these have become less frequent.  Denies suicidal and homicidal ideations.  Reports sleeping and appetite intact.  Denies medication side effects.  Less irritable.  Per chart noted to be responding to internal stimuli yesterday.      Sleep:  Adequate  Appetite:  Adequate  Medication side effects: No   ROS: no complaints    Mental Status Evaluation:    Appearance:  disheveled, marginal hygiene, wearing pajamas   Behavior:  pleasant, cooperative, calm   Speech:  normal rate and volume, clear   Mood:  Mildly anxious   Affect:  constricted   Thought Process:  linear   Associations: circumstantial associations   Thought Content:  no overt delusions, reports less negative thoughts   Perceptual Disturbances: auditory hallucinations of people talking, denies visual hallucinations when asked   Risk Potential: Suicidal ideation - None at present  Homicidal ideation - None  Potential for aggression - No   Sensorium:  oriented to person, place, time/date, and situation   Memory:  recent and remote memory grossly intact   Consciousness:  alert and awake   Attention/Concentration: attention span and concentration appear shorter than expected for age   Insight:  limited   Judgment: limited   Gait/Station: Seen in bed   Motor Activity: no abnormal movements     Vital signs in last 24  hours:    Temp:  [97.8 °F (36.6 °C)-98 °F (36.7 °C)] 98 °F (36.7 °C)  HR:  [70-86] 70  Resp:  [18] 18  BP: (140-148)/(89-97) 140/89    Laboratory results: I have personally reviewed all pertinent laboratory/tests results    Results from the past 24 hours: No results found for this or any previous visit (from the past 24 hour(s)).    Progress Toward Goals: improving gradually chronic auditory hallucinations of people's voices, denies visual hallucinations, denies command hallucinations, mild anxiety but denies depression at present time, sleeping and appetite intact    Assessment & Plan   Principal Problem:    Schizoaffective disorder, bipolar type (HCC)      Current Facility-Administered Medications   Medication Dose Route Frequency Provider Last Rate    acetaminophen  650 mg Oral Q6H PRN Taylor Castro MD      acetaminophen  650 mg Oral Q4H PRN Taylor Castro MD      acetaminophen  975 mg Oral Q6H PRN Taylor Castro MD      albuterol  2 puff Inhalation Q4H PRN Zandra Barclay PA-C      aluminum-magnesium hydroxide-simethicone  30 mL Oral Q4H PRN Taylor Castro MD      atorvastatin  10 mg Oral Daily With Dinner Zandra Barclay PA-C      benzonatate  100 mg Oral TID PRN Zandra Barclay PA-C      haloperidol lactate  2.5 mg Intramuscular Q4H PRN Max 4/day Taylor Castro MD      And    LORazepam  1 mg Intramuscular Q4H PRN Max 4/day Taylor Castro MD      And    benztropine  0.5 mg Intramuscular Q4H PRN Max 4/day Taylor Castro MD      haloperidol lactate  5 mg Intramuscular Q4H PRN Max 4/day Taylor Castro MD      And    LORazepam  2 mg Intramuscular Q4H PRN Max 4/day Taylor Castro MD      And    benztropine  1 mg Intramuscular Q4H PRN Max 4/day Taylor Castro MD      benztropine  1 mg Intramuscular Q4H PRN Max 6/day Taylor Castro MD      benztropine  1 mg Oral Q4H PRN Max 6/day Taylor Castro MD      bisacodyl  10 mg Rectal Daily PRN Taylor Castro MD       Cholecalciferol  1,000 Units Oral Daily Zandra Barclay PA-C      clozapine  400 mg Oral HS Haris Mariee MD      hydrOXYzine HCL  50 mg Oral Q6H PRN Max 4/day Taylor Castro MD      Or    diphenhydrAMINE  50 mg Intramuscular Q6H PRN Taylor Castro MD      fluticasone  1 spray Nasal Daily Zandra Barclay PA-C      Fluticasone Furoate-Vilanterol  1 puff Inhalation Daily Zandra Barclay PA-C      folic acid  1 mg Oral Daily Zandra Barclay PA-C      guaiFENesin  600 mg Oral Q12H KENNY Zandra Barclay PA-C      haloperidol  1 mg Oral Q6H PRN Taylor Castro MD      haloperidol  2.5 mg Oral Q4H PRN Max 4/day Taylor Castro MD      haloperidol  5 mg Oral Q4H PRN Max 4/day Taylor Castro MD      hydrocortisone   Topical 4x Daily PRN Zandra Barclay PA-C      hydrocortisone  25 mg Rectal BID Zandra Barclay PA-C      hydrOXYzine HCL  100 mg Oral Q6H PRN Max 4/day Taylor Castro MD      Or    LORazepam  2 mg Intramuscular Q6H PRN Taylor Castro MD      hydrOXYzine HCL  25 mg Oral Q6H PRN Max 4/day Taylor Castro MD      lamoTRIgine  100 mg Oral BID Leny Cardenas MD      LORazepam  0.5 mg Oral BID Leny Cardenas MD      magnesium hydroxide  30 mL Oral Daily PRN Zandra Barclay PA-C      melatonin  3 mg Oral HS Haris Mariee MD      methocarbamol  500 mg Oral Q6H PRN Zandra Barclay PA-C      multivitamin-minerals  1 tablet Oral Daily Zandra Barclay PA-C      nicotine  21 mg Transdermal Daily Zandra Barclay PA-C      nicotine polacrilex  4 mg Oral Q2H PRN Taylor Castro MD      nystatin  500,000 Units Swish & Swallow 4x Daily Zandra Barclay PA-C      oxybutynin  5 mg Oral BID Zandra Barclay PA-C      pantoprazole  40 mg Oral Daily Before Breakfast Zandra Barclay PA-C      phenol  1 spray Mouth/Throat Q2H PRN Zandra Barclay PA-C      polyethylene glycol  17 g Oral Daily  PRN Taylor Castro MD      propranolol  10 mg Oral Q8H PRSHONA Castro MD      risperiDONE  0.5 mg Oral TID Leny Cardenas MD      senna-docusate sodium  1 tablet Oral Daily GREGORIO Castro MD      thiamine  100 mg Oral Daily Zandra Barclay PA-C      traZODone  50 mg Oral HS PRSHONA Castro MD      venlafaxine  37.5 mg Oral Daily Leny Cardenas MD       Risks / Benefits of Treatment:    Risks, benefits, and possible side effects of medications explained to patient and patient verbalizes understanding and agreement for treatment.    Counseling / Coordination of Care:    Medication education provided to patient.  Educated on importance of medication and treatment compliance.  Group attendance encouraged.    Olaf Bazan PA-C 09/28/24

## 2024-09-28 NOTE — NURSING NOTE
Patient noted to have broken sleep throughout the night. Pt stated it's related to pain. Non labored breathing noted while asleep. Q 7 min safety checks maintained.

## 2024-09-28 NOTE — PROGRESS NOTES
Progress Note - Jo-Ann Lamb 54 y.o. female MRN: [id number]    Unit/Bed#: [facility] 245-02 Encounter: [id number]        Subjective:   Patient seen and examined at bedside after reviewing the chart and discussing the case with the caring staff.      Patient examined at bedside.  Patient complaining of chronic back pain and chest congestion.  Requested ibuprofen and increase in Mucinex.    Physical Exam   Vitals: Blood pressure 144/92, pulse 88, temperature 97.9 °F (36.6 °C), temperature source Temporal, resp. rate 18, height 5' (1.524 m), weight 67 kg (147 lb 12.8 oz), last menstrual period 01/01/2020, SpO2 97%, not currently breastfeeding.,Body mass index is 28.87 kg/m².  Constitutional: Patient in no acute distress.  HEENT: PERR, EOMI, MMM.  Cardiovascular: Normal rate and regular rhythm.    Pulmonary/Chest: Effort normal and breath sounds normal.   Abdomen: Soft, + BS, NT.    Assessment/Plan:  Jo-Ann Lamb is a(n) 54 y.o. female with schizoaffective disorder.     Hyperlipidemia.  Tot chol 209, Triglyc 362.  Restart atorvastatin 10 mg daily.  Asthma.  Patient is on Breo Ellipta daily (Advair nonform), albuterol inhaler as needed.   Allergic rhinitis.  Continue Flonase nasal spray daily.   GERD.  Patient on Protonix 40 mg daily.   Tobacco abuse.  NRT.   Alcohol abuse.  Patient started on thiamine, folic acid, MVI.   Vitamin D deficiency.  Continue vitamin D3 1000 units daily.  Urge incontinence.  Trial oxybutynin 5 mg twice daily.   Constipation/hemorrhoids.  Continue senokot S and Miralax as needed.  Apply Anusol-HC as needed.   Cough/congestion.  Incr Mucinex 600 to 1200 mg twice daily 9/28.  Tessalon perles as needed.  Chronic lower back pain.  Tylenol or Robaxin as needed.  Add ibuprofen and lidocaine patch 9/28.    The patient was discussed with Dr. Steel and he is in agreement with the above note.

## 2024-09-28 NOTE — ASSESSMENT & PLAN NOTE
Severe depression and suicidal ideation in the context of alcohol use.  Delusions of persecution.  Hx of several inpatient psych admissions.  Lives at [facility]. Has ACT team.  Came on Clozaril total daily dose of 400 mg PO daily (50 mg PO daily in the AM, and 350 mg PO daily at bedtime).   Continue Clozaril 400mg PO qHS, with continued Clozaril monitoring parameters.  Has ANC checked on a monthly basis.  ANC from 09/27/2024 5.61.  ECG on 09/26/2024 Normal sinus rhythm, no significant change from last, and Qtc interval 477ms.    Plan:  Continue Clozaril 400mg PO qHS.  Continue  Risperdal 0.5 mg PO TID.  Continue Lamictal 100 mg PO BID  Continue  Ativan 0.5 mg PO BID.  Continue Effexor-XR 37.5 mg PO daily; started 9/26/24  All current active meds have been reviewed.  Medical management per medical team.  Continue to encourage group therapy, milieu therapy and occupational therapy  Behavioral Health checks every 15 minutes  Utilizing 2 antipsychotics at this time for augmentation of Clozaril therapy.

## 2024-09-28 NOTE — PLAN OF CARE
Problem: Depression  Goal: Treatment Goal: Demonstrate behavioral control of depressive symptoms, verbalize feelings of improved mood/affect, and adopt new coping skills prior to discharge  Outcome: Progressing  Goal: Verbalize thoughts and feelings  Description: Interventions:  - Assess and re-assess patient's level of risk   - Engage patient in 1:1 interactions, daily, for a minimum of 15 minutes   - Encourage patient to express feelings, fears, frustrations, hopes   Outcome: Progressing  Goal: Refrain from harming self  Description: Interventions:  - Monitor patient closely, per order   - Supervise medication ingestion, monitor effects and side effects   Outcome: Progressing  Goal: Refrain from self-neglect  Outcome: Progressing  Goal: Attend and participate in unit activities, including therapeutic, recreational, and educational groups  Description: Interventions:  - Provide therapeutic and educational activities daily, encourage attendance and participation, and document same in the medical record   Outcome: Progressing     Problem: Anxiety  Goal: Anxiety is at manageable level  Description: Interventions:  - Assess and monitor patient's anxiety level.   - Monitor for signs and symptoms (heart palpitations, chest pain, shortness of breath, headaches, nausea, feeling jumpy, restlessness, irritable, apprehensive).   - Collaborate with interdisciplinary team and initiate plan and interventions as ordered.  - [place] patient to unit/surroundings  - Explain treatment plan  - Encourage participation in care  - Encourage verbalization of concerns/fears  - Identify coping mechanisms  - Assist in developing anxiety-reducing skills  - Administer/offer alternative therapies  - Limit or eliminate stimulants  Outcome: Progressing     Problem: Risk for Self Injury/Neglect  Goal: Verbalize thoughts and feelings  Description: Interventions:  - Assess and re-assess patient's lethality and potential for self-injury  - Engage  patient in 1:1 interactions, daily, for a minimum of 15 minutes  - Encourage patient to express feelings, fears, frustrations, hopes  - Establish rapport/trust with patient   Outcome: Progressing    See Chart Note

## 2024-09-28 NOTE — NURSING NOTE
Pt visible on unit. Intermittently. Minimal peer interactions. Somatically preoccupied. Frequently seeking prn medications as soon as prescribed. Denies SI and HI. No acute complaints of hallucinations. Safety precautions maintained. Will continue to monitor and assess.

## 2024-09-28 NOTE — PLAN OF CARE
Problem: Ineffective Coping  Goal: Participates in unit activities  Description: Interventions:  - Provide therapeutic environment   - Provide required programming   - Redirect inappropriate behaviors   Outcome: Progressing     Problem: Depression  Goal: Verbalize thoughts and feelings  Description: Interventions:  - Assess and re-assess patient's level of risk   - Engage patient in 1:1 interactions, daily, for a minimum of 15 minutes   - Encourage patient to express feelings, fears, frustrations, hopes   Outcome: Progressing  Goal: Attend and participate in unit activities, including therapeutic, recreational, and educational groups  Description: Interventions:  - Provide therapeutic and educational activities daily, encourage attendance and participation, and document same in the medical record   Outcome: Progressing

## 2024-09-28 NOTE — NURSING NOTE
Patient noted to be withdrawn to self majority of the evening. Denied SI/HI/AVH, depression, and anxiety. Presents w/ flat affect. Compliant w/ hs med regimen. Offers no complaints this evening. Q 7 min safety checks continuous and maintained.

## 2024-09-28 NOTE — PLAN OF CARE
Problem: Risk for Self Injury/Neglect  Goal: Verbalize thoughts and feelings  Description: Interventions:  - Assess and re-assess patient's lethality and potential for self-injury  - Engage patient in 1:1 interactions, daily, for a minimum of 15 minutes  - Encourage patient to express feelings, fears, frustrations, hopes  - Establish rapport/trust with patient   Outcome: Progressing     Problem: Depression  Goal: Treatment Goal: Demonstrate behavioral control of depressive symptoms, verbalize feelings of improved mood/affect, and adopt new coping skills prior to discharge  Outcome: Progressing  Goal: Verbalize thoughts and feelings  Description: Interventions:  - Assess and re-assess patient's level of risk   - Engage patient in 1:1 interactions, daily, for a minimum of 15 minutes   - Encourage patient to express feelings, fears, frustrations, hopes   Outcome: Progressing  Goal: Refrain from harming self  Description: Interventions:  - Monitor patient closely, per order   - Supervise medication ingestion, monitor effects and side effects   Outcome: Progressing  Goal: Refrain from self-neglect  Outcome: Progressing  Goal: Attend and participate in unit activities, including therapeutic, recreational, and educational groups  Description: Interventions:  - Provide therapeutic and educational activities daily, encourage attendance and participation, and document same in the medical record   Outcome: Progressing     Problem: Anxiety  Goal: Anxiety is at manageable level  Description: Interventions:  - Assess and monitor patient's anxiety level.   - Monitor for signs and symptoms (heart palpitations, chest pain, shortness of breath, headaches, nausea, feeling jumpy, restlessness, irritable, apprehensive).   - Collaborate with interdisciplinary team and initiate plan and interventions as ordered.  - [place] patient to unit/surroundings  - Explain treatment plan  - Encourage participation in care  - Encourage verbalization  of concerns/fears  - Identify coping mechanisms  - Assist in developing anxiety-reducing skills  - Administer/offer alternative therapies  - Limit or eliminate stimulants  Outcome: Progressing

## 2024-09-29 PROCEDURE — 99232 SBSQ HOSP IP/OBS MODERATE 35: CPT

## 2024-09-29 RX ORDER — AZITHROMYCIN 250 MG/1
250 TABLET, FILM COATED ORAL EVERY 24 HOURS
Status: DISCONTINUED | OUTPATIENT
Start: 2024-09-30 | End: 2024-10-02 | Stop reason: HOSPADM

## 2024-09-29 RX ORDER — AZITHROMYCIN 250 MG/1
500 TABLET, FILM COATED ORAL EVERY 24 HOURS
Status: COMPLETED | OUTPATIENT
Start: 2024-09-29 | End: 2024-09-29

## 2024-09-29 RX ADMIN — Medication 500000 UNITS: at 21:02

## 2024-09-29 RX ADMIN — OXYBUTYNIN CHLORIDE 5 MG: 5 TABLET ORAL at 17:29

## 2024-09-29 RX ADMIN — OXYBUTYNIN CHLORIDE 5 MG: 5 TABLET ORAL at 08:42

## 2024-09-29 RX ADMIN — RISPERIDONE 0.5 MG: 0.5 TABLET, FILM COATED ORAL at 17:29

## 2024-09-29 RX ADMIN — METHOCARBAMOL TABLETS 500 MG: 500 TABLET, COATED ORAL at 10:11

## 2024-09-29 RX ADMIN — LAMOTRIGINE 100 MG: 100 TABLET ORAL at 17:29

## 2024-09-29 RX ADMIN — THIAMINE HCL TAB 100 MG 100 MG: 100 TAB at 08:42

## 2024-09-29 RX ADMIN — VENLAFAXINE HYDROCHLORIDE 37.5 MG: 37.5 CAPSULE, EXTENDED RELEASE ORAL at 08:42

## 2024-09-29 RX ADMIN — FOLIC ACID 1 MG: 1 TABLET ORAL at 08:42

## 2024-09-29 RX ADMIN — RISPERIDONE 0.5 MG: 0.5 TABLET, FILM COATED ORAL at 21:02

## 2024-09-29 RX ADMIN — LORAZEPAM 0.5 MG: 0.5 TABLET ORAL at 17:29

## 2024-09-29 RX ADMIN — BENZONATATE 100 MG: 100 CAPSULE ORAL at 11:18

## 2024-09-29 RX ADMIN — GUAIFENESIN 1200 MG: 600 TABLET ORAL at 08:41

## 2024-09-29 RX ADMIN — MAGNESIUM HYDROXIDE 30 ML: 400 SUSPENSION ORAL at 13:17

## 2024-09-29 RX ADMIN — AZITHROMYCIN DIHYDRATE 500 MG: 250 TABLET ORAL at 14:49

## 2024-09-29 RX ADMIN — RISPERIDONE 0.5 MG: 0.5 TABLET, FILM COATED ORAL at 08:42

## 2024-09-29 RX ADMIN — IBUPROFEN 600 MG: 600 TABLET, FILM COATED ORAL at 10:11

## 2024-09-29 RX ADMIN — LORAZEPAM 0.5 MG: 0.5 TABLET ORAL at 08:42

## 2024-09-29 RX ADMIN — PANTOPRAZOLE SODIUM 40 MG: 40 TABLET, DELAYED RELEASE ORAL at 08:42

## 2024-09-29 RX ADMIN — CLOZAPINE 400 MG: 100 TABLET ORAL at 21:02

## 2024-09-29 RX ADMIN — HYDROCORTISONE ACETATE 25 MG: 25 SUPPOSITORY RECTAL at 08:42

## 2024-09-29 RX ADMIN — Medication 1 TABLET: at 08:42

## 2024-09-29 RX ADMIN — NICOTINE 21 MG: 21 PATCH, EXTENDED RELEASE TRANSDERMAL at 08:41

## 2024-09-29 RX ADMIN — Medication 500000 UNITS: at 12:58

## 2024-09-29 RX ADMIN — GUAIFENESIN 1200 MG: 600 TABLET ORAL at 21:02

## 2024-09-29 RX ADMIN — LIDOCAINE 1 PATCH: 50 PATCH CUTANEOUS at 08:41

## 2024-09-29 RX ADMIN — ATORVASTATIN CALCIUM 10 MG: 10 TABLET, FILM COATED ORAL at 17:29

## 2024-09-29 RX ADMIN — FLUTICASONE FUROATE AND VILANTEROL TRIFENATATE 1 PUFF: 100; 25 POWDER RESPIRATORY (INHALATION) at 08:44

## 2024-09-29 RX ADMIN — Medication 500000 UNITS: at 08:42

## 2024-09-29 RX ADMIN — FLUTICASONE PROPIONATE 1 SPRAY: 50 SPRAY, METERED NASAL at 08:44

## 2024-09-29 RX ADMIN — Medication 500000 UNITS: at 17:29

## 2024-09-29 RX ADMIN — LAMOTRIGINE 100 MG: 100 TABLET ORAL at 08:42

## 2024-09-29 RX ADMIN — Medication 1000 UNITS: at 08:42

## 2024-09-29 NOTE — PROGRESS NOTES
Progress Note - Behavioral Health   Name: Jo-Ann Lamb 54 y.o. female I MRN: [id number]  Unit/Bed#: -02 I Date of Admission: 9/26/2024   Date of Service: 9/29/2024 I Hospital Day: 3     Assessment & Plan  Schizoaffective disorder, bipolar type (HCC)  Hx of several inpatient psych admissions.  Was residing at [facility] with an ACT team.  Prior to admission was on Clozaril 400mg PO qHS.  Continue Clozaril 400mg PO qHS at this time, with continued Continue Clozaril monitoring parameters as indicated.  Has ANC checked on a monthly basis.  ANC from 09/27/2024 5.61. Obtained CK, BNP, CRP, and Troponin on 9/27/2024.   ECG on 09/26/2024 Normal sinus rhythm, no significant change from last, and Qtc interval 477ms.  Continue to monitor.    Plan:  Continue Clozaril 400mg PO qHS.  Continue Risperdal 0.5 mg PO TID.  Continue Lamictal 100 mg PO BID for mood stabilization.  Continue Ativan 0.5 mg PO BID.  Continue Effexor-XR 37.5 mg PO daily; started 9/26/24  All current active meds were reviewed.  Medical management per [facility].  Patient was added to medical board today per chart.  Encourage group therapy, milieu therapy and occupational therapy  Behavioral Health checks every 15 minutes  Utilizing 2 antipsychotics at this time for augmentation of Clozaril therapy.      Planned medication and treatment changes:    Continue to encourage treatment with group therapy, milieu therapy and occupational therapy  Medical management per SLIM.  Continue current medications as prescribed.    Progress Note - Behavioral Health     Jo-Ann Lamb 54 y.o. female MRN: [id number]   Unit/Bed#: -02 Encounter: [id number]    Behavior over the last 24 hours: unchanged.     Jo-Ann CUMMINGS is seen today for psychiatric follow up. Per nursing notes,.  Visible intermittently, minimal peer interaction, somatically preoccupied, denies SI HI, withdrawn to room in the evening, appearing depressed last evening, slept. Received Cogentin  "1 mg p.o. yesterday, and was written on the medical board.    Today JoA-nn remains somatically preoccupied, and notes that the pain medications do help but they can make her drowsy.  Notes ongoing back pain, has been seen by medical and is to be seen by medical again for a scratch today.  Denies depression and anxiety at this time, denies mood fluctuations throughout the day.  Per chart does not appear patient was labile yesterday.  Denies suicidal and homicidal ideations.  Reports ongoing chronic auditory hallucinations of voices talking, however reports that this has been less frequent recently.  Patient denies command hallucinations and denies distress from the voices.  Reports sleeping and eating well without issue.  Denies medication side effects.  Appears withdrawn/isolative to self/room.      Sleep:  Adequate  Appetite:  Adequate  Medication side effects: No   ROS: no complaints    Mental Status Evaluation:    Appearance:  casually dressed, marginal hygiene, wearing pajamas, patient states she is going to take a shower, seen sitting in her room.   Behavior:  pleasant, cooperative, calm, responds to redirection   Speech:  normal rate and volume, clear   Mood:  \"I'm okay\"   Affect:  constricted, slightly brighter   Thought Process:  Linear   Associations: Less circumstantial, perseverative   Thought Content:  somatic preoccupation, some negative thoughts   Perceptual Disturbances: auditory hallucinations of people talking, denies visual hallucinations when asked, does not appear responding to internal stimuli   Risk Potential: Suicidal ideation - None at present  Homicidal ideation - None  Potential for aggression - No   Sensorium:  oriented to person, place, time/date, and situation   Memory:  recent and remote memory grossly intact   Consciousness:  alert and awake   Attention/Concentration: attention span and concentration are age appropriate   Insight:  limited   Judgment: limited   Gait/Station: Seen sitting " in bed   Motor Activity: no abnormal movements     Vital signs in last 24 hours:    Temp:  [97.9 °F (36.6 °C)-98.4 °F (36.9 °C)] 98.4 °F (36.9 °C)  HR:  [86-88] 86  Resp:  [18] 18  BP: (103-144)/(87-92) 103/87    Laboratory results: I have personally reviewed all pertinent laboratory/tests results    Results from the past 24 hours: No results found for this or any previous visit (from the past 24 hour(s)).    Progress Toward Goals: improving gradually, ongoing chronic auditory hallucinations however less so today, denies other psych symptoms, reports sleeping and eating well, appears less labile today, med compliant    Assessment & Plan   Principal Problem:    Schizoaffective disorder, bipolar type (HCC)      Current Facility-Administered Medications   Medication Dose Route Frequency Provider Last Rate    acetaminophen  650 mg Oral Q6H PRN Taylor Castro MD      acetaminophen  650 mg Oral Q4H PRN Taylor Castro MD      acetaminophen  975 mg Oral Q6H PRN Payton Conrad PA-C      albuterol  2 puff Inhalation Q4H PRN Zandra Barclay PA-C      aluminum-magnesium hydroxide-simethicone  30 mL Oral Q4H PRN Taylor Castro MD      atorvastatin  10 mg Oral Daily With Dinner Zandra Barclay PA-C      benzonatate  100 mg Oral TID PRN Zandra Barclay PA-C      haloperidol lactate  2.5 mg Intramuscular Q4H PRN Max 4/day Taylor Castro MD      And    LORazepam  1 mg Intramuscular Q4H PRN Max 4/day Taylor Castro MD      And    benztropine  0.5 mg Intramuscular Q4H PRN Max 4/day Taylor Castro MD      haloperidol lactate  5 mg Intramuscular Q4H PRN Max 4/day Taylor Castro MD      And    LORazepam  2 mg Intramuscular Q4H PRN Max 4/day Taylor Castro MD      And    benztropine  1 mg Intramuscular Q4H PRN Max 4/day Taylor Castro MD      benztropine  1 mg Intramuscular Q4H PRN Max 6/day Taylor Castro MD      benztropine  1 mg Oral Q4H PRN Max 6/day Taylor Castro MD       bisacodyl  10 mg Rectal Daily PRN Taylor Castro MD      Cholecalciferol  1,000 Units Oral Daily Zandra Barclay PA-C      clozapine  400 mg Oral HS Haris Mariee MD      hydrOXYzine HCL  50 mg Oral Q6H PRN Max 4/day Taylor Castro MD      Or    diphenhydrAMINE  50 mg Intramuscular Q6H PRN Taylor Castro MD      fluticasone  1 spray Nasal Daily Zandra Barclay PA-C      Fluticasone Furoate-Vilanterol  1 puff Inhalation Daily Zandra Barclay PA-C      folic acid  1 mg Oral Daily Zandra Barclay PA-C      guaiFENesin  1,200 mg Oral Q12H KENNY Payton Conrad PA-C      haloperidol  1 mg Oral Q6H PRN Taylor Castro MD      haloperidol  2.5 mg Oral Q4H PRN Max 4/day Taylor Castro MD      haloperidol  5 mg Oral Q4H PRN Max 4/day Taylor Castro MD      hydrocortisone   Topical 4x Daily PRN Zandra Barclay PA-C      hydrocortisone  25 mg Rectal BID Zandra Barclay PA-C      hydrOXYzine HCL  100 mg Oral Q6H PRN Max 4/day Taylor Castro MD      Or    LORazepam  2 mg Intramuscular Q6H PRN Taylor Castro MD      hydrOXYzine HCL  25 mg Oral Q6H PRN Max 4/day Taylor Castro MD      ibuprofen  600 mg Oral Q6H PRN Payton Conrad PA-C      lamoTRIgine  100 mg Oral BID Leny Cardenas MD      lidocaine  1 patch Topical Daily Payton Conrad PA-C      LORazepam  0.5 mg Oral BID Leny Cardenas MD      magnesium hydroxide  30 mL Oral Daily PRN Zandra Barclay PA-C      melatonin  3 mg Oral HS Haris Mariee MD      methocarbamol  500 mg Oral Q6H PRN Zandra Barclay PA-C      multivitamin-minerals  1 tablet Oral Daily Zandra Barclay PA-C      nicotine  21 mg Transdermal Daily Zandra Barclay PA-C      nicotine polacrilex  4 mg Oral Q2H PRN Taylor Castro MD      nystatin  500,000 Units Swish & Swallow 4x Daily Zandra Barclay PA-C      oxybutynin  5 mg Oral BID Zandra Barclay PA-C      pantoprazole   40 mg Oral Daily Before Breakfast Zandra Barclay PA-C      phenol  1 spray Mouth/Throat Q2H PRN Zandra Braclay PA-C      polyethylene glycol  17 g Oral Daily PRN Taylor Castro MD      propranolol  10 mg Oral Q8H PRN Taylor Castro MD      risperiDONE  0.5 mg Oral TID Leny Cardenas MD      senna-docusate sodium  1 tablet Oral Daily PRN Taylor Castro MD      thiamine  100 mg Oral Daily Zandra Barclay PA-C      traZODone  50 mg Oral HS PRN Taylor Castro MD      venlafaxine  37.5 mg Oral Daily Leny Cardenas MD       Risks / Benefits of Treatment:    Risks, benefits, and possible side effects of medications explained to patient and patient verbalizes understanding and agreement for treatment.    Counseling / Coordination of Care:    Medication education provided to patient.  Educated on importance of medication and treatment compliance.  Group attendance encouraged.    Olaf Bazan PA-C 09/29/24

## 2024-09-29 NOTE — ASSESSMENT & PLAN NOTE
Hx of several inpatient psych admissions.  Was residing at [facility] with an ACT team.  Prior to admission was on Clozaril 400mg PO qHS.  Continue Clozaril 400mg PO qHS at this time, with continued Clozaril monitoring parameters as indicated.  Has ANC checked on a monthly basis.  ANC from 09/27/2024 5.61. Obtained CK, BNP, CRP, and Troponin on 9/27/2024.   ECG on 09/26/2024 Normal sinus rhythm, no significant change from last, and Qtc interval 477ms.  Continue to monitor.    Plan:  Continue Clozaril 400mg PO qHS.  Check Clozaril blood level tomorrow.  Continue Risperdal 0.5 mg PO TID.  Continue Lamictal 100 mg PO BID for mood stabilization.  Continue Ativan 0.5 mg PO BID.  Continue Effexor-XR 37.5 mg PO daily; started 9/26/24  Add Miralax 1 packet (17 g) four times daily for constipation.  All current active meds were reviewed.  Medical management per medical team.  Encourage group therapy, milieu therapy and occupational therapy.  Behavioral Health checks every 15 minutes.  Utilizing 2 antipsychotics at this time for augmentation of Clozaril therapy.

## 2024-09-29 NOTE — ASSESSMENT & PLAN NOTE
Hx of several inpatient psych admissions.  Was residing at [facility] with an ACT team.  Prior to admission was on Clozaril 400mg PO qHS.  Continue Clozaril 400mg PO qHS at this time, with continued Continue Clozaril monitoring parameters as indicated.  Has ANC checked on a monthly basis.  ANC from 09/27/2024 5.61. Obtained CK, BNP, CRP, and Troponin on 9/27/2024.   ECG on 09/26/2024 Normal sinus rhythm, no significant change from last, and Qtc interval 477ms.  Continue to monitor.    Plan:  Continue Clozaril 400mg PO qHS.  Continue Risperdal 0.5 mg PO TID.  Continue Lamictal 100 mg PO BID for mood stabilization.  Continue Ativan 0.5 mg PO BID.  Continue Effexor-XR 37.5 mg PO daily; started 9/26/24  All current active meds were reviewed.  Medical management per [facility]IM.  Patient was added to medical board today per chart.  Encourage group therapy, milieu therapy and occupational therapy  Behavioral Health checks every 15 minutes  Utilizing 2 antipsychotics at this time for augmentation of Clozaril therapy.

## 2024-09-29 NOTE — NURSING NOTE
"Patient compliant with meds and meals. Patient denies any psych complaints but is perseverating on somatic symptoms medical provider is aware, patient is c/o feeling like she has \"and infection\" in her body and feels like something is \"wrong\". Patient is cooperative. Flat affect, irritable edge at times. Patient is withdrawn to her visible during meals. Patient is minimal social. Q 7 min behavioral and safety checks in place.  "

## 2024-09-29 NOTE — PROGRESS NOTES
Progress Note - Jo-Ann Lamb 54 y.o. female MRN: [id number]    Unit/Bed#: [facility] 245-02 Encounter: [id number]        Subjective:   Patient seen and examined at bedside after reviewing the chart and discussing the case with the caring staff.      Patient examined at bedside.  Patient complaining of ongoing productive cough.  She feels her cough is worsening and that she has bronchitis.  Took tessalon perles this morning without relief.  Patient also complaining of right outer ear feeling irritated with drainage.     Physical Exam   Vitals: Blood pressure 103/87, pulse 86, temperature 98.4 °F (36.9 °C), temperature source Temporal, resp. rate 18, height 5' (1.524 m), weight 67.8 kg (149 lb 6.4 oz), last menstrual period 01/01/2020, SpO2 97%, not currently breastfeeding.,Body mass index is 29.18 kg/m².  Constitutional: Patient in no acute distress.  HEENT: PERR, EOMI, MMM.  B/l external ears without any wounds or rash, auditory canals ear, TMs normal, no drainage noted.   Cardiovascular: Normal rate and regular rhythm.    Pulmonary/Chest: Effort normal and breath sounds normal.   Abdomen: Soft, + BS, NT.    Assessment/Plan:  Jo-Ann Lamb is a(n) 54 y.o. female with schizoaffective disorder.     Hyperlipidemia.  Tot chol 209, Triglyc 362.  Restart atorvastatin 10 mg daily.  Asthma.  Patient is on Breo Ellipta daily (Advair nonform), albuterol inhaler as needed.   Allergic rhinitis.  Continue Flonase nasal spray daily.   GERD.  Patient on Protonix 40 mg daily.   Tobacco abuse.  NRT.   Alcohol abuse.  Patient started on thiamine, folic acid, MVI.   Vitamin D deficiency.  Continue vitamin D3 1000 units daily.  Urge incontinence.  Trial oxybutynin 5 mg twice daily.   Constipation/hemorrhoids.  Continue senokot S and Miralax as needed.  Apply Anusol-HC as needed.   Chronic lower back pain.  Tylenol or Robaxin as needed.  Add ibuprofen and lidocaine patch 9/28.  Cough/congestion.  Incr Mucinex 600 to 1200 mg twice daily 9/28.   Tessalon perles as needed.  Afebrile.  Vitals stable.  Started Z-nelly 9/29.  Consider adding steroid if no improvement.     The patient was discussed with Dr. tSeel and he is in agreement with the above note.

## 2024-09-29 NOTE — NURSING NOTE
BLEPS assessment completed provider aware of episode of emesis will cont to monitor for cont episodes. Otherwise assessment unremarkable refr to flowsheets

## 2024-09-29 NOTE — PROGRESS NOTES
Progress Note - Behavioral Health   Name: Jo-Ann Lamb 54 y.o. female I MRN: [id number]  Unit/Bed#: U 245-02 I Date of Admission: 9/26/2024   Date of Service: 9/30/2024 I Hospital Day: 4  Encounter: [id number]        Assessment & Plan  Schizoaffective disorder, bipolar type (HCC)  Hx of several inpatient psych admissions.  Was residing at [facility] with an ACT team.  Prior to admission was on Clozaril 400mg PO qHS.  Continue Clozaril 400mg PO qHS at this time, with continued Clozaril monitoring parameters as indicated.  Has ANC checked on a monthly basis.  ANC from 09/27/2024 5.61. Obtained CK, BNP, CRP, and Troponin on 9/27/2024.   ECG on 09/26/2024 Normal sinus rhythm, no significant change from last, and Qtc interval 477ms.  Continue to monitor.    Plan:  Continue Clozaril 400mg PO qHS.  Check Clozaril blood level tomorrow.  Continue Risperdal 0.5 mg PO TID.  Continue Lamictal 100 mg PO BID for mood stabilization.  Continue Ativan 0.5 mg PO BID.  Continue Effexor-XR 37.5 mg PO daily; started 9/26/24  Add Miralax 1 packet (17 g) four times daily for constipation.  All current active meds were reviewed.  Medical management per medical team.  Encourage group therapy, milieu therapy and occupational therapy.  Behavioral Health checks every 15 minutes.  Utilizing 2 antipsychotics at this time for augmentation of Clozaril therapy.      Risks / Benefits of Treatment:  Risks, benefits, and possible side effects of medications explained to patient including risk of rash related to treatment with Lamictal, risk of parkinsonian symptoms, Tardive Dyskinesia and metabolic syndrome related to treatment with antipsychotic medications, risk of cardiovascular events in elderly related to treatment with antipsychotic medications, and risk of suicidality and serotonin syndrome related to treatment with antidepressants. The patient verbalizes understanding and agreement for treatment.      Subjective:  The  patient was evaluated this morning for continuity of care and no acute distress noted throughout the evaluation. Over the past 24 hours staff noted that Jo-Ann CUMMINGS has been endorsing somatic symptoms, labile at imes, has nausea and vomited yesterday. Patient has been medication adherent.    Today on evaluation,J-oAnn CUMMINGS  presenting with thoughts consistent with paranoia of persecution which is chronic, and she states she cannot tell what part ofher thoughts and beliefs is real and which is not. She speaks about fear of people, who she believes are trying to hurt her. Says years ago she had a SI with plan, but no plan since that. In the past Jo-Ann was pessimistic, staying in bed most of the time, she says, but now tries to be optimistic. Effexor is helpful. She endorses AH, states she had a non-commanading AH yesterday. In the past when she was not on her medicatons she experienced AH of commnding nature telling her to kill herself, but she denies AH with commands currently.  AH boy screaming from being tortured, and at such times I scream.   Does not have much of social life but has a good support system: her boyfriend is very supposrtive, and she has an ACT who is very engaged with her. She says she has paranoid schizophrenia since age 27.  The patient is discharge fixated today.  In terms of safety, Jo-Ann CUMMINGS Denies suicidal ideation and thoughts of wanting to harm self. Denies homicidal ideation. Jo-Ann CUMMINGS Denies auditory hallucinations and visual hallucinations. Reports symptoms consistent with delusional thought content. Denies symptoms consistent with sudheer/hypomania.      Psychiatric Review of Systems:  Behavior over the last 24 hours: some improvement.   Sleep: slept off and on, difficulty falling asleep  Appetite: normal  Medication side effects: No   ROS: reports constipation    Mental Status Examination:  Appearance:  casually dressed, marginal hygiene, looks stated age   Behavior:  pleasant, cooperative, calm  "  Speech:  normal rate and volume   Mood:  \"I am filling better\".   Affect:  constricted   Thought Process:  linear, normal rate of thoughts   Associations: still somewhat circumstantial   Thought Content:  somatically preoccupied   Perceptual Disturbances: auditory hallucinations without commands and of \"people talking\" still present, but less frequent   Risk Potential: Suicidal ideation - None at present  Homicidal ideation - None  Potential for aggression - Not at present   Sensorium:  oriented to person, place, time/date, and situation   Memory:  recent and remote memory grossly intact   Consciousness:  alert and awake   Attention/Concentration: attention span and concentration are age appropriate   Insight:  limited   Judgment: limited   Gait/Station: normal gait/station, normal balance   Motor Activity: no abnormal movements       Vital signs in last 24 hours:  Temp:  [97.5 °F (36.4 °C)-98.5 °F (36.9 °C)] 97.5 °F (36.4 °C)  HR:  [95-97] 97  Resp:  [16-17] 16  BP: ()/(69-94) 120/94       Laboratory results: I have personally reviewed all pertinent laboratory/tests results  High Sensitivity Troponin   Lab Results   Component Value Date    HSTNI 3 (L) 09/27/2024     CRP   Lab Results   Component Value Date    CRP 9.0 (H) 09/26/2024     Brain Natruretic Peptide   Lab Results   Component Value Date    BNP 27 09/27/2024       Progress Toward Goals: improving    Current Medications:  Current Facility-Administered Medications   Medication Dose Route Frequency Provider Last Rate    acetaminophen  650 mg Oral Q6H PRN Taylor Castro MD      acetaminophen  650 mg Oral Q4H PRN Taylor Castro MD      acetaminophen  975 mg Oral Q6H PRN Payton Conrad PA-C      albuterol  2 puff Inhalation Q4H PRN Zandra Barclay PA-C      aluminum-magnesium hydroxide-simethicone  30 mL Oral Q4H PRN Taylor Castro MD      atorvastatin  10 mg Oral Daily With Dinner Zadnra Barclay PA-C      azithromycin  250 " mg Oral Q24H Payton L MARTINE Conrad      benzonatate  100 mg Oral TID PRN Zandra Barclay PA-C      haloperidol lactate  2.5 mg Intramuscular Q4H PRN Max 4/day Taylor Castro MD      And    LORazepam  1 mg Intramuscular Q4H PRN Max 4/day Taylor Castro MD      And    benztropine  0.5 mg Intramuscular Q4H PRN Max 4/day Taylor Castro MD      haloperidol lactate  5 mg Intramuscular Q4H PRN Max 4/day Taylor Castro MD      And    LORazepam  2 mg Intramuscular Q4H PRN Max 4/day Taylor Castro MD      And    benztropine  1 mg Intramuscular Q4H PRN Max 4/day Taylor Castro MD      benztropine  1 mg Intramuscular Q4H PRN Max 6/day Taylor Castro MD      benztropine  1 mg Oral Q4H PRN Max 6/day Taylor Castro MD      bisacodyl  10 mg Rectal Daily PRN Taylor Castro MD      Cholecalciferol  1,000 Units Oral Daily Zandra Barclay PA-C      clozapine  400 mg Oral HS Haris Mariee MD      hydrOXYzine HCL  50 mg Oral Q6H PRN Max 4/day Taylor Castro MD      Or    diphenhydrAMINE  50 mg Intramuscular Q6H PRN Taylor Castro MD      fluticasone  1 spray Nasal Daily Zandra Barclay PA-C      Fluticasone Furoate-Vilanterol  1 puff Inhalation Daily Zandra Barclay PA-C      folic acid  1 mg Oral Daily Zandra Barclay PA-C      guaiFENesin  1,200 mg Oral Q12H [facility] Payton Conrad PA-C      haloperidol  1 mg Oral Q6H PRN Taylor Castro MD      haloperidol  2.5 mg Oral Q4H PRN Max 4/day Taylor Castro MD      haloperidol  5 mg Oral Q4H PRN Max 4/day Taylor Castro MD      hydrocortisone   Topical 4x Daily PRN Zandra Barclay PA-C      hydrocortisone  25 mg Rectal BID PRN Zandra Devon Barclay, PA-C      hydrOXYzine HCL  100 mg Oral Q6H PRN Max 4/day Taylor Casrto MD      Or    LORazepam  2 mg Intramuscular Q6H PRN Taylor Castro MD      hydrOXYzine HCL  25 mg Oral Q6H PRN Max 4/day Taylor Castro MD      ibuprofen  600 mg Oral Q6H  PRN Payton Conrad PA-C      lamoTRIgine  100 mg Oral BID Leny Cardenas MD      lidocaine  1 patch Topical Daily Payton Conrad PA-C      LORazepam  0.5 mg Oral BID Leny Cradenas MD      magnesium hydroxide  30 mL Oral Daily PRN Zandra Barclay PA-C      melatonin  3 mg Oral HS Haris Mariee MD      methocarbamol  500 mg Oral Q6H PRN Zandra Barclay PA-C      multivitamin-minerals  1 tablet Oral Daily Zandra Barclay PA-C      nicotine  21 mg Transdermal Daily Zandra Barclay PA-C      nicotine polacrilex  4 mg Oral Q2H PRN Taylor Castro MD      nystatin  500,000 Units Swish & Swallow 4x Daily Zandra Barclay PA-C      oxybutynin  5 mg Oral BID Zandra Barclay PA-C      pantoprazole  40 mg Oral Daily Before Breakfast Zandra Barclay PA-C      phenol  1 spray Mouth/Throat Q2H PRN Zandra Barclay PA-C      polyethylene glycol  17 g Oral Daily PRN Taylor Castro MD      polyethylene glycol  17 g Oral 4x Daily Haris Mariee MD      propranolol  10 mg Oral Q8H PRN Taylor Castro MD      risperiDONE  0.5 mg Oral TID Leny Cardenas MD      senna-docusate sodium  1 tablet Oral Daily PRN Taylor Castro MD      thiamine  100 mg Oral Daily Zandra Barclay PA-C      traZODone  50 mg Oral HS PRN Taylor Castro MD      venlafaxine  37.5 mg Oral Daily Leny Cardenas MD         Counseling / Coordination of Care:  Total floor / unit time spent today 31 minutes. Greater than 50% of total time was spent with the patient and / or family counseling and / or coordination of care. A description of counseling / coordination of care:  Patient's progress discussed with staff in treatment team meeting.  Medications, treatment progress and treatment plan reviewed with patient.        Haris Mariee MD    09/30/24  Psychiatry Resident, PGY-2

## 2024-09-29 NOTE — NURSING NOTE
"Patient withdrawn to room this evening. Patient ate snack in her room. Appears flat, sad and depressed. Denies any SI/HI,AV/H, anxiety or depression. C/o her R ear lobe is sore due to a scratch and had drainage/ \"infected per patient\". Reassured patient that it will be written on dr board for tomorrow..Medication compliant. Q 7 continuous monitoring maintained.  "

## 2024-09-30 PROCEDURE — 99232 SBSQ HOSP IP/OBS MODERATE 35: CPT | Performed by: PSYCHIATRY & NEUROLOGY

## 2024-09-30 RX ORDER — HYDROCORTISONE ACETATE 25 MG/1
25 SUPPOSITORY RECTAL 2 TIMES DAILY PRN
Status: DISCONTINUED | OUTPATIENT
Start: 2024-09-30 | End: 2024-10-02 | Stop reason: HOSPADM

## 2024-09-30 RX ORDER — POLYETHYLENE GLYCOL 3350 17 G/17G
17 POWDER, FOR SOLUTION ORAL 4 TIMES DAILY
Status: DISCONTINUED | OUTPATIENT
Start: 2024-09-30 | End: 2024-10-02 | Stop reason: HOSPADM

## 2024-09-30 RX ADMIN — GUAIFENESIN 1200 MG: 600 TABLET ORAL at 20:37

## 2024-09-30 RX ADMIN — FLUTICASONE FUROATE AND VILANTEROL TRIFENATATE 1 PUFF: 100; 25 POWDER RESPIRATORY (INHALATION) at 09:13

## 2024-09-30 RX ADMIN — Medication 500000 UNITS: at 18:10

## 2024-09-30 RX ADMIN — LIDOCAINE 1 PATCH: 50 PATCH CUTANEOUS at 09:12

## 2024-09-30 RX ADMIN — LORAZEPAM 0.5 MG: 0.5 TABLET ORAL at 09:12

## 2024-09-30 RX ADMIN — Medication 3 MG: at 20:37

## 2024-09-30 RX ADMIN — THIAMINE HCL TAB 100 MG 100 MG: 100 TAB at 09:12

## 2024-09-30 RX ADMIN — GUAIFENESIN 1200 MG: 600 TABLET ORAL at 09:12

## 2024-09-30 RX ADMIN — OXYBUTYNIN CHLORIDE 5 MG: 5 TABLET ORAL at 09:12

## 2024-09-30 RX ADMIN — FOLIC ACID 1 MG: 1 TABLET ORAL at 09:12

## 2024-09-30 RX ADMIN — Medication 500000 UNITS: at 20:54

## 2024-09-30 RX ADMIN — HYDROCORTISONE ACETATE 25 MG: 25 SUPPOSITORY RECTAL at 09:11

## 2024-09-30 RX ADMIN — LAMOTRIGINE 100 MG: 100 TABLET ORAL at 09:12

## 2024-09-30 RX ADMIN — Medication 1 TABLET: at 09:12

## 2024-09-30 RX ADMIN — Medication 1000 UNITS: at 09:12

## 2024-09-30 RX ADMIN — RISPERIDONE 0.5 MG: 0.5 TABLET, FILM COATED ORAL at 15:40

## 2024-09-30 RX ADMIN — FLUTICASONE PROPIONATE 1 SPRAY: 50 SPRAY, METERED NASAL at 09:13

## 2024-09-30 RX ADMIN — VENLAFAXINE HYDROCHLORIDE 37.5 MG: 37.5 CAPSULE, EXTENDED RELEASE ORAL at 09:12

## 2024-09-30 RX ADMIN — Medication 500000 UNITS: at 11:50

## 2024-09-30 RX ADMIN — AZITHROMYCIN DIHYDRATE 250 MG: 250 TABLET ORAL at 11:50

## 2024-09-30 RX ADMIN — RISPERIDONE 0.5 MG: 0.5 TABLET, FILM COATED ORAL at 09:12

## 2024-09-30 RX ADMIN — SENNOSIDES AND DOCUSATE SODIUM 1 TABLET: 8.6; 5 TABLET ORAL at 18:12

## 2024-09-30 RX ADMIN — RISPERIDONE 0.5 MG: 0.5 TABLET, FILM COATED ORAL at 20:37

## 2024-09-30 RX ADMIN — Medication 500000 UNITS: at 09:11

## 2024-09-30 RX ADMIN — PANTOPRAZOLE SODIUM 40 MG: 40 TABLET, DELAYED RELEASE ORAL at 09:12

## 2024-09-30 RX ADMIN — POLYETHYLENE GLYCOL 3350 17 G: 17 POWDER, FOR SOLUTION ORAL at 20:37

## 2024-09-30 RX ADMIN — CLOZAPINE 400 MG: 100 TABLET ORAL at 20:37

## 2024-09-30 RX ADMIN — NICOTINE 21 MG: 21 PATCH, EXTENDED RELEASE TRANSDERMAL at 09:12

## 2024-09-30 RX ADMIN — ATORVASTATIN CALCIUM 10 MG: 10 TABLET, FILM COATED ORAL at 15:40

## 2024-09-30 RX ADMIN — OXYBUTYNIN CHLORIDE 5 MG: 5 TABLET ORAL at 18:10

## 2024-09-30 RX ADMIN — MAGNESIUM HYDROXIDE 30 ML: 400 SUSPENSION ORAL at 09:18

## 2024-09-30 RX ADMIN — LAMOTRIGINE 100 MG: 100 TABLET ORAL at 18:10

## 2024-09-30 RX ADMIN — LORAZEPAM 0.5 MG: 0.5 TABLET ORAL at 18:10

## 2024-09-30 NOTE — PLAN OF CARE
Problem: Ineffective Coping  Goal: Free from restraint events  Description: - Utilize least restrictive measures   - Provide behavioral interventions   - Redirect inappropriate behaviors   Outcome: Progressing     Problem: Depression  Goal: Refrain from harming self  Description: Interventions:  - Monitor patient closely, per order   - Supervise medication ingestion, monitor effects and side effects   Outcome: Progressing   See chart ntoe

## 2024-09-30 NOTE — PROGRESS NOTES
Progress Note - Jo-Ann Lamb 54 y.o. female MRN: [id number]    Unit/Bed#: [facility] 245-02 Encounter: [id number]        Subjective:   Patient seen and examined at bedside after reviewing the chart and discussing the case with the caring staff.      Patient examined at bedside.  Patient denies any new symptoms.     Physical Exam   Vitals: Blood pressure 92/69, pulse 95, temperature 98.5 °F (36.9 °C), temperature source Temporal, resp. rate 17, height 5' (1.524 m), weight 67.8 kg (149 lb 6.4 oz), last menstrual period 01/01/2020, SpO2 98%, not currently breastfeeding.,Body mass index is 29.18 kg/m².  Constitutional: Patient in no acute distress.  HEENT: PERR, EOMI, MMM.  B/l external ears without any wounds or rash, auditory canals ear, TMs normal, no drainage noted.   Cardiovascular: Normal rate and regular rhythm.    Pulmonary/Chest: Effort normal and breath sounds normal.   Abdomen: Soft, + BS, NT.    Assessment/Plan:  Jo-Ann Lamb is a(n) 54 y.o. female with schizoaffective disorder.     Hyperlipidemia.  Tot chol 209, Triglyc 362.  Restart atorvastatin 10 mg daily.  Asthma.  Patient is on Breo Ellipta daily (Advair nonform), albuterol inhaler as needed.   Allergic rhinitis.  Continue Flonase nasal spray daily.   GERD.  Patient on Protonix 40 mg daily.   Tobacco abuse.  NRT.   Alcohol abuse.  Patient started on thiamine, folic acid, MVI.   Vitamin D deficiency.  Continue vitamin D3 1000 units daily.  Urge incontinence.  Trial oxybutynin 5 mg twice daily.   Constipation/hemorrhoids.  Continue senokot S and Miralax as needed.  Apply Anusol-HC as needed.   Chronic lower back pain.  Tylenol or Robaxin as needed.  Add ibuprofen and lidocaine patch 9/28.  Cough/congestion.  Incr Mucinex 600 to 1200 mg twice daily 9/28.  Tessalon perles as needed.  Afebrile.  Vitals stable.  Started Z-nelly 9/29.  Consider adding steroid if no improvement.     The patient was discussed with Dr. Steel and he is in agreement with the above note.

## 2024-09-30 NOTE — NURSING NOTE
Withdrawn to her room, visible for snack. Compliant with medication. Cooperative with staff during care. Denied SI, HI, AVH, depression, or anxiety. Appeared anxiety and guarded during assessment. Pt's mood was labile. Affect was flat. Eye contact was good. Speech pattern was normal and relaxed. Appearance and hygiene was unremarkable. Made no c/o pain or discomfort. 7 minute safety checks continued.

## 2024-09-30 NOTE — NURSING NOTE
Patient med and meal compliant visible on unit calm cooperative, patient asked for multiple different laxatives/stool softeners threw out the shift although stating last BM was yesterday (9/28/24) Denies SI HI AVH at this time Safety checks maintained.

## 2024-09-30 NOTE — PROGRESS NOTES
09/30/24    Team Meeting   Meeting Type Daily Rounds   Team Members Present   Team Members Present Physician;Nurse;;Occupational Therapist   Physician Team Member Florencio Mcclendon, Didier Mcclendon, Jaylene Mcclendon, Yair COLEMAN, Hortensia FARLEY   Nursing Team Member Agustin JIMENES   Social Work Team Member Vimal BURROUGHS   OT Team Member Pope BETTYE   Patient/Family Present   Patient Present No   Patient's Family Present No     201, only called out once over weekend, maintaining, tolerates Clozaril well, chronically delusional, overall mood better, broken sleep, dc focused, denies all

## 2024-09-30 NOTE — PLAN OF CARE
Problem: Ineffective Coping  Goal: Participates in unit activities  Description: Interventions:  - Provide therapeutic environment   - Provide required programming   - Redirect inappropriate behaviors   Outcome: Not Progressing     Problem: Depression  Goal: Attend and participate in unit activities, including therapeutic, recreational, and educational groups  Description: Interventions:  - Provide therapeutic and educational activities daily, encourage attendance and participation, and document same in the medical record   Outcome: Not Progressing

## 2024-10-01 LAB — CLOZAPINE SERPL-MCNC: 1286 NG/ML (ref 350–900)

## 2024-10-01 PROCEDURE — 80159 DRUG ASSAY CLOZAPINE: CPT

## 2024-10-01 PROCEDURE — 99232 SBSQ HOSP IP/OBS MODERATE 35: CPT | Performed by: PSYCHIATRY & NEUROLOGY

## 2024-10-01 RX ORDER — GUAIFENESIN 1200 MG/1
1200 TABLET, EXTENDED RELEASE ORAL EVERY 12 HOURS SCHEDULED
Qty: 30 TABLET | Refills: 0 | Status: SHIPPED | OUTPATIENT
Start: 2024-10-01

## 2024-10-01 RX ORDER — NICOTINE 21 MG/24HR
1 PATCH, TRANSDERMAL 24 HOURS TRANSDERMAL DAILY
Qty: 28 PATCH | Refills: 0 | Status: SHIPPED | OUTPATIENT
Start: 2024-10-02

## 2024-10-01 RX ORDER — LORAZEPAM 0.5 MG/1
0.5 TABLET ORAL 2 TIMES DAILY
Qty: 14 TABLET | Refills: 0 | Status: SHIPPED | OUTPATIENT
Start: 2024-10-01 | End: 2024-10-08

## 2024-10-01 RX ORDER — METHOCARBAMOL 500 MG/1
500 TABLET, FILM COATED ORAL 2 TIMES DAILY
Qty: 30 TABLET | Refills: 0 | Status: SHIPPED | OUTPATIENT
Start: 2024-10-01

## 2024-10-01 RX ORDER — BENZONATATE 100 MG/1
100 CAPSULE ORAL 3 TIMES DAILY PRN
Qty: 20 CAPSULE | Refills: 0 | Status: SHIPPED | OUTPATIENT
Start: 2024-10-01

## 2024-10-01 RX ORDER — LIDOCAINE 50 MG/G
1 PATCH TOPICAL DAILY
Qty: 30 PATCH | Refills: 0 | Status: SHIPPED | OUTPATIENT
Start: 2024-10-02

## 2024-10-01 RX ORDER — NYSTATIN 100000 [USP'U]/ML
500000 SUSPENSION ORAL 4 TIMES DAILY
Qty: 65 ML | Refills: 0 | Status: SHIPPED | OUTPATIENT
Start: 2024-10-01 | End: 2024-10-05

## 2024-10-01 RX ORDER — FOLIC ACID 1 MG/1
1 TABLET ORAL DAILY
Qty: 30 TABLET | Refills: 0 | Status: SHIPPED | OUTPATIENT
Start: 2024-10-01

## 2024-10-01 RX ORDER — PANTOPRAZOLE SODIUM 40 MG/1
40 TABLET, DELAYED RELEASE ORAL
Qty: 90 TABLET | Refills: 0 | Status: SHIPPED | OUTPATIENT
Start: 2024-10-01

## 2024-10-01 RX ORDER — ATORVASTATIN CALCIUM 10 MG/1
10 TABLET, FILM COATED ORAL DAILY
Qty: 90 TABLET | Refills: 0 | Status: SHIPPED | OUTPATIENT
Start: 2024-10-01

## 2024-10-01 RX ORDER — ALBUTEROL SULFATE 90 UG/1
2 INHALANT RESPIRATORY (INHALATION) EVERY 4 HOURS PRN
Qty: 18 G | Refills: 0 | Status: SHIPPED | OUTPATIENT
Start: 2024-10-01

## 2024-10-01 RX ORDER — AZITHROMYCIN 250 MG/1
250 TABLET, FILM COATED ORAL EVERY 24 HOURS
Qty: 2 TABLET | Refills: 0 | Status: SHIPPED | OUTPATIENT
Start: 2024-10-02 | End: 2024-10-04

## 2024-10-01 RX ORDER — VENLAFAXINE HYDROCHLORIDE 37.5 MG/1
37.5 CAPSULE, EXTENDED RELEASE ORAL DAILY
Qty: 7 CAPSULE | Refills: 0 | Status: SHIPPED | OUTPATIENT
Start: 2024-10-02 | End: 2024-10-09

## 2024-10-01 RX ORDER — LANOLIN ALCOHOL/MO/W.PET/CERES
3 CREAM (GRAM) TOPICAL
Qty: 7 TABLET | Refills: 0 | Status: SHIPPED | OUTPATIENT
Start: 2024-10-01 | End: 2024-10-08

## 2024-10-01 RX ORDER — OXYBUTYNIN CHLORIDE 5 MG/1
5 TABLET ORAL 2 TIMES DAILY
Qty: 60 TABLET | Refills: 0 | Status: SHIPPED | OUTPATIENT
Start: 2024-10-01

## 2024-10-01 RX ORDER — POLYETHYLENE GLYCOL 3350 17 G/17G
17 POWDER, FOR SOLUTION ORAL DAILY
Qty: 510 G | Refills: 0 | Status: SHIPPED | OUTPATIENT
Start: 2024-10-01

## 2024-10-01 RX ORDER — RISPERIDONE 0.5 MG/1
0.5 TABLET ORAL 3 TIMES DAILY
Qty: 21 TABLET | Refills: 0 | Status: SHIPPED | OUTPATIENT
Start: 2024-10-01 | End: 2024-10-08

## 2024-10-01 RX ORDER — FLUTICASONE PROPIONATE 50 MCG
1 SPRAY, SUSPENSION (ML) NASAL DAILY
Qty: 16 G | Refills: 0 | Status: SHIPPED | OUTPATIENT
Start: 2024-10-01

## 2024-10-01 RX ORDER — CLOZAPINE 200 MG/1
400 TABLET ORAL
Qty: 14 TABLET | Refills: 0 | Status: SHIPPED | OUTPATIENT
Start: 2024-10-01 | End: 2024-10-08

## 2024-10-01 RX ORDER — LAMOTRIGINE 100 MG/1
100 TABLET ORAL 2 TIMES DAILY
Qty: 14 TABLET | Refills: 0 | Status: SHIPPED | OUTPATIENT
Start: 2024-10-01 | End: 2024-10-08

## 2024-10-01 RX ORDER — AMOXICILLIN 250 MG
1 CAPSULE ORAL 2 TIMES DAILY
Qty: 60 TABLET | Refills: 0 | Status: SHIPPED | OUTPATIENT
Start: 2024-10-01 | End: 2024-10-31

## 2024-10-01 RX ORDER — HYDROCORTISONE 25 MG/G
CREAM TOPICAL 4 TIMES DAILY PRN
Qty: 28 G | Refills: 0 | Status: SHIPPED | OUTPATIENT
Start: 2024-10-01

## 2024-10-01 RX ORDER — HYDROCORTISONE ACETATE 25 MG/1
25 SUPPOSITORY RECTAL 2 TIMES DAILY PRN
Qty: 12 SUPPOSITORY | Refills: 0 | Status: SHIPPED | OUTPATIENT
Start: 2024-10-01

## 2024-10-01 RX ORDER — BISACODYL 10 MG
10 SUPPOSITORY, RECTAL RECTAL DAILY PRN
Status: DISCONTINUED | OUTPATIENT
Start: 2024-10-01 | End: 2024-10-02 | Stop reason: HOSPADM

## 2024-10-01 RX ORDER — FLUTICASONE PROPIONATE AND SALMETEROL XINAFOATE 115; 21 UG/1; UG/1
2 AEROSOL, METERED RESPIRATORY (INHALATION) 2 TIMES DAILY
Qty: 12 G | Refills: 0 | Status: SHIPPED | OUTPATIENT
Start: 2024-10-01

## 2024-10-01 RX ORDER — UBIDECARENONE 100 MG
1000 CAPSULE ORAL DAILY
Qty: 90 CAPSULE | Refills: 0 | Status: SHIPPED | OUTPATIENT
Start: 2024-10-01

## 2024-10-01 RX ORDER — AMOXICILLIN 250 MG
1 CAPSULE ORAL 2 TIMES DAILY
Status: DISCONTINUED | OUTPATIENT
Start: 2024-10-01 | End: 2024-10-02 | Stop reason: HOSPADM

## 2024-10-01 RX ORDER — IBUPROFEN 600 MG/1
600 TABLET, FILM COATED ORAL EVERY 6 HOURS PRN
Qty: 40 TABLET | Refills: 0 | Status: SHIPPED | OUTPATIENT
Start: 2024-10-01

## 2024-10-01 RX ADMIN — LAMOTRIGINE 100 MG: 100 TABLET ORAL at 08:38

## 2024-10-01 RX ADMIN — POLYETHYLENE GLYCOL 3350 17 G: 17 POWDER, FOR SOLUTION ORAL at 08:37

## 2024-10-01 RX ADMIN — Medication 500000 UNITS: at 12:34

## 2024-10-01 RX ADMIN — HYDROCORTISONE ACETATE 25 MG: 25 SUPPOSITORY RECTAL at 12:34

## 2024-10-01 RX ADMIN — GUAIFENESIN 1200 MG: 600 TABLET ORAL at 20:08

## 2024-10-01 RX ADMIN — LAMOTRIGINE 100 MG: 100 TABLET ORAL at 17:35

## 2024-10-01 RX ADMIN — GUAIFENESIN 1200 MG: 600 TABLET ORAL at 08:37

## 2024-10-01 RX ADMIN — AZITHROMYCIN DIHYDRATE 250 MG: 250 TABLET ORAL at 12:34

## 2024-10-01 RX ADMIN — RISPERIDONE 0.5 MG: 0.5 TABLET, FILM COATED ORAL at 20:08

## 2024-10-01 RX ADMIN — ATORVASTATIN CALCIUM 10 MG: 10 TABLET, FILM COATED ORAL at 16:23

## 2024-10-01 RX ADMIN — LORAZEPAM 0.5 MG: 0.5 TABLET ORAL at 08:38

## 2024-10-01 RX ADMIN — POLYETHYLENE GLYCOL 3350 17 G: 17 POWDER, FOR SOLUTION ORAL at 20:07

## 2024-10-01 RX ADMIN — VENLAFAXINE HYDROCHLORIDE 37.5 MG: 37.5 CAPSULE, EXTENDED RELEASE ORAL at 08:37

## 2024-10-01 RX ADMIN — SENNOSIDES AND DOCUSATE SODIUM 1 TABLET: 8.6; 5 TABLET ORAL at 17:35

## 2024-10-01 RX ADMIN — LORAZEPAM 0.5 MG: 0.5 TABLET ORAL at 17:35

## 2024-10-01 RX ADMIN — NICOTINE 21 MG: 21 PATCH, EXTENDED RELEASE TRANSDERMAL at 08:37

## 2024-10-01 RX ADMIN — OXYBUTYNIN CHLORIDE 5 MG: 5 TABLET ORAL at 08:37

## 2024-10-01 RX ADMIN — MAGNESIUM HYDROXIDE 30 ML: 400 SUSPENSION ORAL at 12:34

## 2024-10-01 RX ADMIN — FOLIC ACID 1 MG: 1 TABLET ORAL at 08:38

## 2024-10-01 RX ADMIN — Medication 500000 UNITS: at 20:09

## 2024-10-01 RX ADMIN — RISPERIDONE 0.5 MG: 0.5 TABLET, FILM COATED ORAL at 16:23

## 2024-10-01 RX ADMIN — FLUTICASONE FUROATE AND VILANTEROL TRIFENATATE 1 PUFF: 100; 25 POWDER RESPIRATORY (INHALATION) at 08:37

## 2024-10-01 RX ADMIN — LIDOCAINE 1 PATCH: 50 PATCH CUTANEOUS at 08:37

## 2024-10-01 RX ADMIN — FLUTICASONE PROPIONATE 1 SPRAY: 50 SPRAY, METERED NASAL at 08:37

## 2024-10-01 RX ADMIN — Medication 500000 UNITS: at 17:35

## 2024-10-01 RX ADMIN — Medication 1 TABLET: at 08:38

## 2024-10-01 RX ADMIN — OXYBUTYNIN CHLORIDE 5 MG: 5 TABLET ORAL at 17:35

## 2024-10-01 RX ADMIN — THIAMINE HCL TAB 100 MG 100 MG: 100 TAB at 08:37

## 2024-10-01 RX ADMIN — RISPERIDONE 0.5 MG: 0.5 TABLET, FILM COATED ORAL at 08:38

## 2024-10-01 RX ADMIN — CLOZAPINE 400 MG: 100 TABLET ORAL at 20:07

## 2024-10-01 RX ADMIN — Medication 3 MG: at 20:08

## 2024-10-01 RX ADMIN — Medication 500000 UNITS: at 08:37

## 2024-10-01 RX ADMIN — HYDROCORTISONE: 25 CREAM TOPICAL at 12:34

## 2024-10-01 RX ADMIN — PANTOPRAZOLE SODIUM 40 MG: 40 TABLET, DELAYED RELEASE ORAL at 08:37

## 2024-10-01 RX ADMIN — Medication 1000 UNITS: at 08:37

## 2024-10-01 NOTE — DISCHARGE INSTR - AVS FIRST PAGE
A 7-day supply of psychotropic medications was submitted to [facility] on 10/1/2024    Please continue monthly CBCD on outpatient basis while on Clozaril therapy    Continue Senna-S 1 tablet twice daily to prevent constipation associated with Clozaril therapy

## 2024-10-01 NOTE — ASSESSMENT & PLAN NOTE
Hx of several inpatient psych admissions.  Was residing at [facility] with an ACT team.  Prior to admission was on Clozaril 400mg PO qHS.  Continue Clozaril 400mg PO qHS at this time, with continued Clozaril monitoring parameters as indicated.  Has ANC checked on a monthly basis.  ANC from 09/27/2024 5.61. Obtained CK, BNP, CRP, and Troponin on 9/27/2024.   ECG on 09/26/2024 Normal sinus rhythm, no significant change from last, and Qtc interval 477ms.  Continue to monitor.    Plan:  Continue Clozaril 400mg PO qHS.  Check Clozaril blood level tomorrow.  Continue Risperdal 0.5 mg PO TID.  Continue Lamictal 100 mg PO BID for mood stabilization.  Continue Ativan 0.5 mg PO BID.  Continue Effexor-XR 37.5 mg PO daily; started 9/26/24  Add Miralax 1 packet (17 g) four times daily for constipation.  All current active meds were reviewed.  Medical management per medical team.  Encourage group therapy, milieu therapy and occupational therapy.  Behavioral Health checks every 15 minutes.  Utilizing 2 antipsychotics at this time for augmentation of Clozaril therapy.

## 2024-10-01 NOTE — PROGRESS NOTES
Progress Note - Jo-Ann Lamb 54 y.o. female MRN: [id number]    Unit/Bed#: [facility] 245-02 Encounter: [id number]        Subjective:   Patient seen and examined at bedside after reviewing the chart and discussing the case with the caring staff.      Patient examined at bedside.  Patient is complaining of constipation and is requesting that if her Senokot could be changed to 2 times daily.  Currently patient is also receiving MiraLAX 4 times daily.    On review of patient's labs patient's vitamin B12 level was found to be low 335.    Patient is being discharged tomorrow 10/02/2024. patient is requesting prescriptions.  I reviewed and reconciled patient's problem list and medications.    Physical Exam   Vitals: Blood pressure 122/86, pulse 91, temperature 97.8 °F (36.6 °C), temperature source Temporal, resp. rate 16, height 5' (1.524 m), weight 67.8 kg (149 lb 6.4 oz), last menstrual period 01/01/2020, SpO2 92%, not currently breastfeeding.,Body mass index is 29.18 kg/m².  Constitutional: Patient in no acute distress.  HEENT: PERR, EOMI, MMM.  B/l external ears without any wounds or rash, auditory canals ear, TMs normal, no drainage noted.   Cardiovascular: Normal rate and regular rhythm.    Pulmonary/Chest: Effort normal and breath sounds normal.   Abdomen: Soft, + BS, NT.    Assessment/Plan:  Jo-Ann Lamb is a(n) 54 y.o. female with schizoaffective disorder.     Medical clearance.  Patient is medically cleared for discharge.  All scripts will be sent out for the patient.    Hyperlipidemia.  Tot chol 209, Triglyc 362.  Restart atorvastatin 10 mg daily.  Asthma.  Patient is on Breo Ellipta daily (Advair nonform), albuterol inhaler as needed.   Allergic rhinitis.  Continue Flonase nasal spray daily.   GERD.  Patient on Protonix 40 mg daily.   Tobacco abuse.  NRT.   Alcohol abuse.  Patient started on thiamine, folic acid, MVI.   Vitamin D deficiency.  Continue vitamin D3 1000 units daily.  Urge incontinence.  Trial oxybutynin 5  mg twice daily.   Constipation/hemorrhoids.  Continue senokot S and Miralax as needed.  Apply Anusol-HC as needed.   Chronic lower back pain.  Tylenol or Robaxin as needed.  Add ibuprofen and lidocaine patch 9/28.  Cough/congestion.  Incr Mucinex 600 to 1200 mg twice daily 9/28.  Tessalon perles as needed.  Afebrile.  Vitals stable.  Started Z-nelly 9/29.  Consider adding steroid if no improvement.   Constipation.  Patient has been put on Senokot-S 1 tablet twice daily 10/1/2024 along with MiraLAX 4 times daily.  Vitamin B12 deficiency.  I will put the patient on vitamin B12 supplements.

## 2024-10-01 NOTE — ASSESSMENT & PLAN NOTE
Hx of several inpatient psych admissions.  Was residing at [facility] with an ACT team.  Prior to admission was on Clozaril 400mg PO qHS.  Continue Clozaril 400mg PO qHS at this time, with continued Clozaril monitoring parameters as indicated.  Has ANC checked on a monthly basis.  ANC from 09/27/2024 5.61. Obtained CK, BNP, CRP, and Troponin on 9/27/2024.   ECG on 09/26/2024 Normal sinus rhythm, no significant change from last, and Qtc interval 477ms.  Continue to monitor.    Plan:  Continue Clozaril 400mg PO qHS.  Clozaril level pending 10/1/24  Continue Risperdal 0.5 mg PO TID.  Continue Lamictal 100 mg PO BID for mood stabilization.  Continue Ativan 0.5 mg PO BID.  Continue Effexor-XR 37.5 mg PO daily; started 9/26/24  Add Miralax 1 packet (17 g) four times daily for constipation.  All current active meds were reviewed.  Medical management per medical team.  Encourage group therapy, milieu therapy and occupational therapy.  Behavioral Health checks every 15 minutes.  Utilizing 2 antipsychotics at this time for augmentation of Clozaril therapy.

## 2024-10-01 NOTE — PLAN OF CARE
Problem: Depression  Goal: Refrain from harming self  Description: Interventions:  - Monitor patient closely, per order   - Supervise medication ingestion, monitor effects and side effects   Outcome: Progressing  Goal: Refrain from self-neglect  Outcome: Progressing   See chart note

## 2024-10-01 NOTE — NURSING NOTE
Patient compliant with meds and meals. Patient denies any psych complaints patient less fixated on somatic symptoms. Patient is more pleasant and cooperative then previous. Patient is less irritable. Visible during meals and some groups otherwise withdrawn to room. Q 7 min behavioral and safety checks in place.

## 2024-10-01 NOTE — PROGRESS NOTES
10/01/24    Team Meeting   Meeting Type Daily Rounds   Team Members Present   Team Members Present Physician;Nurse;;Occupational Therapist   Physician Team Member Florencio LARSON, Didier LARSON, Jaylene LARSON, Yair COLEMAN   Nursing Team Member Agustin JIMENES   Social Work Team Member Vimal BURROUGHS   OT Team Member Pope BETTYE   Patient/Family Present   Patient Present No   Patient's Family Present No     201, somatic, dc focused, feels better, dc tomorrow to independent living with OP psych

## 2024-10-01 NOTE — NURSING NOTE
Patient withdrawn to room this evening. Patient came out for snack. Appears flat, sad and depressed. Pleasant and cooperative. Denies any SI/HI,AV/H, anxiety or depression.         Medication compliant. Q 7 continuous monitoring maintained.

## 2024-10-01 NOTE — PROGRESS NOTES
Met with patient completed her relapse prevention. She was able to identify her protective factors,warning signs, stressors,coping skills and support people. We reviewed the community supports. She is looking forward to discharge tomorrow.

## 2024-10-01 NOTE — PROGRESS NOTES
Progress Note - Behavioral Health   Name: Jo-Ann Lamb 54 y.o. female I MRN: [id number]  Unit/Bed#: [facility] 245-02 I Date of Admission: 9/26/2024   Date of Service: 10/1/2024 I Hospital Day: 5    Assessment & Plan  Schizoaffective disorder, bipolar type (HCC)  Hx of several inpatient psych admissions.  Was residing at [facility] with an ACT team.  Prior to admission was on Clozaril 400mg PO qHS.  Continue Clozaril 400mg PO qHS at this time, with continued Clozaril monitoring parameters as indicated.  Has ANC checked on a monthly basis.  ANC from 09/27/2024 5.61. Obtained CK, BNP, CRP, and Troponin on 9/27/2024.   ECG on 09/26/2024 Normal sinus rhythm, no significant change from last, and Qtc interval 477ms.  Continue to monitor.    Plan:  Continue Clozaril 400mg PO qHS.  Clozaril level pending 10/1/24  Continue Risperdal 0.5 mg PO TID.  Continue Lamictal 100 mg PO BID for mood stabilization.  Continue Ativan 0.5 mg PO BID.  Continue Effexor-XR 37.5 mg PO daily; started 9/26/24  Add Miralax 1 packet (17 g) four times daily for constipation.  All current active meds were reviewed.  Medical management per medical team.  Encourage group therapy, milieu therapy and occupational therapy.  Behavioral Health checks every 15 minutes.  Utilizing 2 antipsychotics at this time for augmentation of Clozaril therapy.    Progress Toward Goals: Improving.  Maintaining safety and mood control with no return of SI.  Reports decreased intensity and frequency of chronic AH; noncommanding in nature.  Clozaril level pending.  Continue monthly CBCD on Clozaril therapy.  Continue with current psychotropic regimen.  Patient appears to be approaching her psychiatric baseline; plan is to discharge home tomorrow, 10/2/24, with ACT services.    Recommended Treatment: Continue with group therapy, milieu therapy and occupational therapy.      Risks, benefits and possible side effects of Medications:   Risks, benefits, and  "possible side effects of medications explained to patient and patient verbalizes understanding.      History of Present Illness   Behavior over the last 24 hours:  unchanged  Sleep: normal  Appetite: normal  Medication side effects: No  ROS: no complaints and all other systems are negative    Subjective: Jo-Ann is intermittently visible in the milieu.  Maintaining safety with no return of SI.  States AH \"are the quietest they've ever been\" and noncommanding in nature.  Endorses mild anxiety regarding discharge and states \"sometimes I become afraid at home.\" Able to identify adequate safety plan and protective factors against suicide.  Denies depression.  Tending to ADLs independently and maintaining adequate appetite and sleep.  Thoughts were linear with no overt delusional content verbalized.    Objective   Mental Status Evaluation:  Appearance:  Wearing pajamas, blonde hair in bun   Behavior:  Cooperative, calm, good eye contact   Speech:  normal pitch and normal volume   Mood:  anxious   Affect:  constricted, mood-congruent, and redirectable   Thought Process:  goal directed and linear, forward thinking   Associations: circumstantial associations   Thought Content:  Some somatic preoccupation   Perceptual Disturbances: Decreased intensity and frequency of AH, noncommanding, described as \" the quietist they've ever been.\"   Risk Potential: Suicidal Ideations none  Homicidal Ideations none  Potential for Aggression No   Sensorium:  person, place, time/date, and situation   Memory:  recent and remote memory grossly intact   Consciousness:  alert and awake    Attention: attention span and concentration were age appropriate   Insight:  limited   Judgment: limited   Gait/Station: normal gait/station and normal balance   Motor Activity: no abnormal movements     Medications: all current active meds have been reviewed and continue current psychiatric medications.      Lab Results: I have reviewed the following results: " Drug Screen:   Results from last 7 days   Lab Units 09/25/24  1419   BARBITURATE UR  Negative   BENZODIAZEPINE UR  Negative   THC UR  Negative   COCAINE UR  Negative   METHADONE URINE  Negative   OPIATE UR  Negative   PCP UR  Negative     CBC:   Lab Results   Component Value Date    WBC 8.65 09/27/2024    RBC 4.99 09/27/2024    HGB 14.3 09/27/2024    HCT 45.6 09/27/2024    MCV 91 09/27/2024     09/27/2024    MCH 28.7 09/27/2024    MCHC 31.4 09/27/2024    RDW 14.6 09/27/2024    MPV 9.3 09/27/2024    NEUTROABS 5.61 09/27/2024     Clozapine:   Lab Results   Component Value Date    CLOZAPINE 867 04/04/2024    NCLOZIP 241 11/29/2023     EKG   Lab Results   Component Value Date    VENTRATE 96 09/26/2024    ATRIALRATE 96 09/26/2024    PRINT 152 09/26/2024    QRSDINT 74 09/26/2024    QTINT 378 09/26/2024    QTCINT 477 09/26/2024    PAXIS 58 09/26/2024    QRSAXIS -4 09/26/2024    TWAVEAXIS 41 09/26/2024     Cardiac Profile   Lab Results   Component Value Date    CKTOTAL 55 09/27/2024    TROPONINI <0.03 03/09/2021    POCTROP 0.00 08/20/2015       Administrative Statements   I have spent a total time of 30 minutes in caring for this patient on the day of the visit/encounter including medication education, treatment plan, safety/discharge planning.

## 2024-10-01 NOTE — SOCIAL WORK
Franklin returned calls to Alejandra at [facility][note 3] to discuss discharge tomorrow and discharge planning. No safety concerns at this time. Pt will be returning to independent living with OP psych follow up with  Dr. Gallego on Friday, facilitated by the ACT team.     FRANKLIN called [facility] to arrange pickup for tomorrow at 10:00. No safety concerns at this time.

## 2024-10-02 VITALS
HEIGHT: 60 IN | SYSTOLIC BLOOD PRESSURE: 139 MMHG | HEART RATE: 86 BPM | BODY MASS INDEX: 29.33 KG/M2 | TEMPERATURE: 97.8 F | WEIGHT: 149.4 LBS | RESPIRATION RATE: 18 BRPM | OXYGEN SATURATION: 94 % | DIASTOLIC BLOOD PRESSURE: 85 MMHG

## 2024-10-02 PROCEDURE — 99238 HOSP IP/OBS DSCHRG MGMT 30/<: CPT | Performed by: NURSE PRACTITIONER

## 2024-10-02 RX ADMIN — VENLAFAXINE HYDROCHLORIDE 37.5 MG: 37.5 CAPSULE, EXTENDED RELEASE ORAL at 08:19

## 2024-10-02 RX ADMIN — SENNOSIDES AND DOCUSATE SODIUM 1 TABLET: 8.6; 5 TABLET ORAL at 08:19

## 2024-10-02 RX ADMIN — Medication 1 TABLET: at 08:19

## 2024-10-02 RX ADMIN — GUAIFENESIN 1200 MG: 600 TABLET ORAL at 08:19

## 2024-10-02 RX ADMIN — THIAMINE HCL TAB 100 MG 100 MG: 100 TAB at 08:19

## 2024-10-02 RX ADMIN — LAMOTRIGINE 100 MG: 100 TABLET ORAL at 08:19

## 2024-10-02 RX ADMIN — PANTOPRAZOLE SODIUM 40 MG: 40 TABLET, DELAYED RELEASE ORAL at 08:19

## 2024-10-02 RX ADMIN — FOLIC ACID 1 MG: 1 TABLET ORAL at 08:19

## 2024-10-02 RX ADMIN — Medication 500000 UNITS: at 08:18

## 2024-10-02 RX ADMIN — RISPERIDONE 0.5 MG: 0.5 TABLET, FILM COATED ORAL at 08:19

## 2024-10-02 RX ADMIN — Medication 1000 UNITS: at 08:19

## 2024-10-02 RX ADMIN — LIDOCAINE 1 PATCH: 50 PATCH CUTANEOUS at 08:19

## 2024-10-02 RX ADMIN — LORAZEPAM 0.5 MG: 0.5 TABLET ORAL at 08:19

## 2024-10-02 RX ADMIN — OXYBUTYNIN CHLORIDE 5 MG: 5 TABLET ORAL at 08:19

## 2024-10-02 RX ADMIN — CYANOCOBALAMIN TAB 500 MCG 500 MCG: 500 TAB at 08:19

## 2024-10-02 NOTE — PLAN OF CARE
Problem: Depression  Goal: Attend and participate in unit activities, including therapeutic, recreational, and educational groups  Description: Interventions:  - Provide therapeutic and educational activities daily, encourage attendance and participation, and document same in the medical record   Outcome: Progressing     Problem: Ineffective Coping  Goal: Participates in unit activities  Description: Interventions:  - Provide therapeutic environment   - Provide required programming   - Redirect inappropriate behaviors   Outcome: Progressing   Patient is more active in group therapy

## 2024-10-02 NOTE — DISCHARGE INSTR - OTHER ORDERS
You are being discharged to [facility]   [address]   [facility] [address]     Triggers you have identified during your hospitalization that led to your admission includes a distressed mood and ineffective coping skills. Coping skills you have identified during your hospitalization include music and art therapy. If you are unable to deal with your distressed mood alone please contact Nancy Lamb (Sister)   [phone]. If that is not effective and you continue to have a distressed mood, are overwhelmed, or are in crisis please contact (Crisis #) [facility] 4 , dial 566 or go to the nearest emergency center.      *[facility] Crisis Hotline: 719.267.8175  *[place] Suicide Prevention Lifeline:  1-526.571.6496  *Alcohol Anonymous: 457.650.5699  *[facility] Alcohol [facility]: (201) 973-5691  *National [place] on Mental [place]ness (MIKE) HELPLINE: 706.995.5406/Website: www.mike.org  *Substance Abuse and Mental Health Services Administration([facility]) National Helpline, which is a confidential, free, 24-hour-a-day, 365-day-a-year, information service for individuals and family members facing mental health and/or substance use disorders. This service provides referrals to local treatment facilities, support groups, and community-based organizations. Callers can also order free publications and other information.  Call 1-559.661.5213/Website: www.[facility].gov  *[facility] 2-1-1: This is a toll free, confidential, 24-hour-a-day service which connects you to a community  in your area who can help you find services and resources that are available to you locally and provide critical services that can improve and save lives.  Call: 211  /Website: http://www.ROBLOXorg/         Demetra, or Mayi, our Behavioral Health Nurse Navigators, will be calling you after your discharge, on the phone number that you provided.  They will be available as an additional support, if needed.    If you wish to speak with one of them, you may contact Demetra at [phone] or Mayi at [phone].        Outpatient Drug & Alcohol Treatment   The [facility] currently operates an outpatient treatment unit:  [facility] in [place], [place] as a functional unit of the [facility]  The Outpatient treatment units are licensed by the [place] Department of Drug & Alcohol Programs to provide individual and group counseling for those with substance abuse and dependency problems. The clinical staff is experienced in a variety of therapeutic modalities and provides treatment that is individualized to meet the particular needs of each person. These units are drug-free treatment programs.  The [facility] accepts most major healthcare insurance coverage plans, [place] Medical Assistance and in those cases where the consumer has no third party healthcare coverage, a liberal sliding fee schedule is utilized. The length of service and type of outpatient service provided is based on the results of the Level of Care Assessment            There are currently three treatment protocols available:  Outpatient  Intensive Outpatient  Contracted services for Partial Hospitalization  Therapy is provided in both Individual and Group counseling formats. The Outpatient department offers individual counseling for the family members of substance abusers to address co-dependent and enabling behaviors.    Outpatient treatment services in [facility] are purchased through a fee-for-service subcontract with:  [place] [facility]  [address] [place] [address]   Phone: [phone]    [facility] Outpatient  [facility], [address]  [address]  [place] [address]   New Admissions [phone]  Local office [phone]    Outpatient treatment services in [facility] are purchased through fee-for-service subcontracts with:  [facility]   [address]  [place], [place]  [address]  Phone: [phone]  [facility]  [address]  [place] [address]  Phone: New Admissions [phone] / Local Office [phone]  Outpatient treatment services are also available to [facility] residents in our [facility] Office.       March 24, 2020   [facility] Food Pantries   1. [place] serves households in [facility] and is located in [employer], [address]. Food distribution is the third Thursday of each month from 6 to 7 p.m. For more information, contact Umm Tafoya at [phone].   2.  [place] serves households in the [facility] and is located in [address]. Food distribution is the second Saturday of each month from 9 to 11 a.m.     For more information, contact Elisa Gerber at [phone] or the Rev. Andrew Orta at [phone].   3.  [place] serves households in [place] and is located in [facility], [address]. Food distribution is the third Monday of each month from 9:30 to 11 a.m.     For more information, contact Harlan Figueroa at [phone].   4.  [facility] serves households in [place]; [facility]; [place] and the [facility], including [place], and is located in [facility], [address] Food distribution is every Tuesday from 10 a.m. to 3 p.m.     For more information, contact Alessio Martinez or Minerva Hahn at [phone].   5.  [facility] offers a food pantry through [employer] \"[employer] [place].\" The pantry is located at [address] [place] [address]. Food distributions take place the second Wednesday of each month between 4 and 7 p.m. and the fourth Saturday of each month between 9 a.m. and noon. For more information, call " [phone].   6.  [place] serves households in [facility] and [facility] and is located in [facility], [address]. Food distribution is the third Thursday of each month from 1 to 3:30 p.m.     For more information, contact Yancy Parekh at [phone] or Clarita Melo at [phone].   7.  [place] serves households in [facility] and [facility], and the [facility] and is located in [employer], [address] [place] [address]. Food distribution is the fourth Friday of each month from 8:30 a.m. to 3:30 p.m.    For more information, contact Kinza Amor at [phone], or James Qureshi at [phone].   8.  [place] serves households in [place] and is located in the [facility], [address]. Food distribution is the fourth Tuesday of each month from 10 a.m. to noon.     For more information, contact Demetra Chowdary at [phone], or Dandre Nuñez at [phone].   9.  [place] serves households in [facility] and is located in the [facility], [facility]. Food distribution is from January to October, the fourth Saturday of each month from 9 to 11 a.m. and for November and December, the third Saturday of each month from 9 to 11 a.m.     For more information, contact Mariella Blackwell at [phone].   10.  [place] serves households in [facility] and [facility] and is located in [facility], [address] [place] [address]. Food distribution is the second Tuesday of each month from 2 to 4 p.m.     For more information, contact Mark Jacques at [phone] or the Rev. Andrew Mckenzie at [phone].

## 2024-10-02 NOTE — NURSING NOTE
Patient visible on unit. Calm and cooperative. Denies SI,HI,AVH. Medication compliant. Safety checks continue Q 7 minutes.

## 2024-10-02 NOTE — PROGRESS NOTES
Progress Note - Jo-Ann Lamb 54 y.o. female MRN: [id number]    Unit/Bed#: [facility] 245-02 Encounter: [id number]        Subjective:   Patient seen and examined at bedside after reviewing the chart and discussing the case with the caring staff.      Patient examined at bedside.  Patient denies any acute symptoms.     Patient is being discharged today, 10/02/2024. Patient is requesting prescriptions.  I reviewed and reconciled patient's problem list and medications.    Physical Exam   Vitals: Blood pressure 139/85, pulse 86, temperature 97.8 °F (36.6 °C), temperature source Temporal, resp. rate 18, height 5' (1.524 m), weight 67.8 kg (149 lb 6.4 oz), last menstrual period 01/01/2020, SpO2 94%, not currently breastfeeding.,Body mass index is 29.18 kg/m².  Constitutional: Patient in no acute distress.  HEENT: PERR, EOMI, MMM.  B/l external ears without any wounds or rash, auditory canals ear, TMs normal, no drainage noted.   Cardiovascular: Normal rate and regular rhythm.    Pulmonary/Chest: Effort normal and breath sounds normal.   Abdomen: Soft, + BS, NT.    Assessment/Plan:  Jo-Ann Lamb is a(n) 54 y.o. female with schizoaffective disorder.     Medical clearance.  Patient is medically cleared for discharge.  All scripts will be sent out for the patient.    Hyperlipidemia.  Tot chol 209, Triglyc 362.  Restart atorvastatin 10 mg daily.  Asthma.  Patient is on Breo Ellipta daily (Advair nonform), albuterol inhaler as needed.   Allergic rhinitis.  Continue Flonase nasal spray daily.   GERD.  Patient on Protonix 40 mg daily.   Tobacco abuse.  NRT.   Alcohol abuse.  Patient started on thiamine, folic acid, MVI.   Vitamin D deficiency.  Continue vitamin D3 1000 units daily.  Urge incontinence.  Trial oxybutynin 5 mg twice daily.   Constipation/hemorrhoids.  Continue senokot S and Miralax as needed.  Apply Anusol-HC as needed.   Chronic lower back pain.  Tylenol or Robaxin as needed.  Add ibuprofen and lidocaine patch  9/28.  Cough/congestion.  Incr Mucinex 600 to 1200 mg twice daily 9/28.  Tessalon perles as needed.  Afebrile.  Vitals stable.  Started Z-nelly 9/29.  Consider adding steroid if no improvement.   Constipation.  Patient has been put on Senokot-S 1 tablet twice daily 10/1/2024 along with MiraLAX 4 times daily.  Vitamin B12 deficiency.  I will put the patient on vitamin B12 supplements.    The patient was discussed with Dr. Steel and he is in agreement with the above note.

## 2024-10-02 NOTE — NURSING NOTE
Patient is calm and cooperative with staff; bright during conversation. Patient A&Ox4 denies SI/HI or AVH, reports no anxiety or depression. AVS reviewed with patient and belongings (including medications to return home) checked with patient. Patient walked off unit with belongings in hand.
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
## 2024-10-02 NOTE — BH TRANSITION RECORD
Contact Information: If you have any questions, concerns, pended studies, tests and/or procedures, or emergencies regarding your inpatient behavioral health visit. Please contact [facility] # [phone] and ask to speak to a , nurse or physician. A contact is available 24 hours/ 7 days a week at this number.     Summary of Procedures Performed During your Stay:  Below is a list of major procedures performed during your hospital stay and a summary of results:  - No major procedures performed.    Pending Studies (From admission, onward)       Start     Ordered    10/03/24 1335  CBC and differential  Every Thursday      Start Status   10/03/24 1335 Scheduled   10/10/24 1335 Scheduled   10/17/24 1335 Scheduled       09/30/24 1334                  Please follow up on the above pending studies with your PCP and/or referring provider.

## 2024-10-02 NOTE — PROGRESS NOTES
10/02/24    Team Meeting   Meeting Type Daily Rounds   Team Members Present   Team Members Present Physician;Nurse;;Occupational Therapist   Physician Team Member Florencio LARSON, Didier LARSON, Jaylene LARSON, Yair COLEMAN, Hortensia SHEFFIELDP   Nursing Team Member Agustin JIMENES   Social Work Team Member Vimal BURROUGHS   OT Team Member Pope BETTYE, Intern   Patient/Family Present   Patient Present No   Patient's Family Present No     201, dc today, clozaril level 1286, denies side effects

## 2024-10-19 ENCOUNTER — APPOINTMENT (EMERGENCY)
Dept: RADIOLOGY | Facility: HOSPITAL | Age: 54
End: 2024-10-19
Payer: COMMERCIAL

## 2024-10-19 ENCOUNTER — HOSPITAL ENCOUNTER (EMERGENCY)
Facility: HOSPITAL | Age: 54
Discharge: HOME | End: 2024-10-19
Attending: EMERGENCY MEDICINE
Payer: COMMERCIAL

## 2024-10-19 VITALS
HEIGHT: 60 IN | BODY MASS INDEX: 31.41 KG/M2 | OXYGEN SATURATION: 97 % | RESPIRATION RATE: 20 BRPM | HEART RATE: 93 BPM | DIASTOLIC BLOOD PRESSURE: 98 MMHG | SYSTOLIC BLOOD PRESSURE: 166 MMHG | WEIGHT: 160 LBS

## 2024-10-19 DIAGNOSIS — R07.89 NON-CARDIAC CHEST PAIN: Primary | ICD-10-CM

## 2024-10-19 LAB
ANION GAP SERPL CALCULATED.3IONS-SCNC: 10 MMOL/L (ref 4–13)
BASOPHILS # BLD AUTO: 0.04 THOUSANDS/ΜL (ref 0–0.1)
BASOPHILS NFR BLD AUTO: 0 % (ref 0–1)
BUN SERPL-MCNC: 6 MG/DL (ref 5–25)
CALCIUM SERPL-MCNC: 8.9 MG/DL (ref 8.4–10.2)
CARDIAC TROPONIN I PNL SERPL HS: 3 NG/L
CHLORIDE SERPL-SCNC: 96 MMOL/L (ref 96–108)
CO2 SERPL-SCNC: 28 MMOL/L (ref 21–32)
CREAT SERPL-MCNC: 0.62 MG/DL (ref 0.6–1.3)
D DIMER PPP FEU-MCNC: <0.27 UG/ML FEU
EOSINOPHIL # BLD AUTO: 0.28 THOUSAND/ΜL (ref 0–0.61)
EOSINOPHIL NFR BLD AUTO: 2 % (ref 0–6)
ERYTHROCYTE [DISTWIDTH] IN BLOOD BY AUTOMATED COUNT: 15.2 % (ref 11.6–15.1)
GFR SERPL CREATININE-BSD FRML MDRD: 103 ML/MIN/1.73SQ M
GLUCOSE SERPL-MCNC: 99 MG/DL (ref 65–140)
HCT VFR BLD AUTO: 39.9 % (ref 34.8–46.1)
HGB BLD-MCNC: 12.9 G/DL (ref 11.5–15.4)
IMM GRANULOCYTES # BLD AUTO: 0.09 THOUSAND/UL (ref 0–0.2)
IMM GRANULOCYTES NFR BLD AUTO: 1 % (ref 0–2)
LYMPHOCYTES # BLD AUTO: 1.92 THOUSANDS/ΜL (ref 0.6–4.47)
LYMPHOCYTES NFR BLD AUTO: 15 % (ref 14–44)
MCH RBC QN AUTO: 29.2 PG (ref 26.8–34.3)
MCHC RBC AUTO-ENTMCNC: 32.3 G/DL (ref 31.4–37.4)
MCV RBC AUTO: 90 FL (ref 82–98)
MONOCYTES # BLD AUTO: 0.63 THOUSAND/ΜL (ref 0.17–1.22)
MONOCYTES NFR BLD AUTO: 5 % (ref 4–12)
NEUTROPHILS # BLD AUTO: 10.3 THOUSANDS/ΜL (ref 1.85–7.62)
NEUTS SEG NFR BLD AUTO: 77 % (ref 43–75)
NRBC BLD AUTO-RTO: 0 /100 WBCS
PLATELET # BLD AUTO: 230 THOUSANDS/UL (ref 149–390)
PMV BLD AUTO: 8.6 FL (ref 8.9–12.7)
POTASSIUM SERPL-SCNC: 3.9 MMOL/L (ref 3.5–5.3)
RBC # BLD AUTO: 4.42 MILLION/UL (ref 3.81–5.12)
SODIUM SERPL-SCNC: 134 MMOL/L (ref 135–147)
WBC # BLD AUTO: 13.26 THOUSAND/UL (ref 4.31–10.16)

## 2024-10-19 PROCEDURE — 99284 EMERGENCY DEPT VISIT MOD MDM: CPT | Performed by: EMERGENCY MEDICINE

## 2024-10-19 PROCEDURE — 71045 X-RAY EXAM CHEST 1 VIEW: CPT

## 2024-10-19 PROCEDURE — 84484 ASSAY OF TROPONIN QUANT: CPT | Performed by: EMERGENCY MEDICINE

## 2024-10-19 PROCEDURE — 93005 ELECTROCARDIOGRAM TRACING: CPT

## 2024-10-19 PROCEDURE — 80048 BASIC METABOLIC PNL TOTAL CA: CPT | Performed by: EMERGENCY MEDICINE

## 2024-10-19 PROCEDURE — 36415 COLL VENOUS BLD VENIPUNCTURE: CPT | Performed by: EMERGENCY MEDICINE

## 2024-10-19 PROCEDURE — 85025 COMPLETE CBC W/AUTO DIFF WBC: CPT | Performed by: EMERGENCY MEDICINE

## 2024-10-19 PROCEDURE — 94640 AIRWAY INHALATION TREATMENT: CPT

## 2024-10-19 PROCEDURE — 96374 THER/PROPH/DIAG INJ IV PUSH: CPT

## 2024-10-19 PROCEDURE — 99285 EMERGENCY DEPT VISIT HI MDM: CPT

## 2024-10-19 PROCEDURE — 85379 FIBRIN DEGRADATION QUANT: CPT | Performed by: EMERGENCY MEDICINE

## 2024-10-19 RX ORDER — IPRATROPIUM BROMIDE AND ALBUTEROL SULFATE 2.5; .5 MG/3ML; MG/3ML
3 SOLUTION RESPIRATORY (INHALATION) ONCE
Status: COMPLETED | OUTPATIENT
Start: 2024-10-19 | End: 2024-10-19

## 2024-10-19 RX ORDER — SODIUM CHLORIDE 9 MG/ML
3 INJECTION INTRAVENOUS
Status: DISCONTINUED | OUTPATIENT
Start: 2024-10-19 | End: 2024-10-19 | Stop reason: HOSPADM

## 2024-10-19 RX ORDER — KETOROLAC TROMETHAMINE 30 MG/ML
15 INJECTION, SOLUTION INTRAMUSCULAR; INTRAVENOUS ONCE
Status: COMPLETED | OUTPATIENT
Start: 2024-10-19 | End: 2024-10-19

## 2024-10-19 RX ADMIN — IPRATROPIUM BROMIDE AND ALBUTEROL SULFATE 3 ML: 2.5; .5 SOLUTION RESPIRATORY (INHALATION) at 11:42

## 2024-10-19 RX ADMIN — KETOROLAC TROMETHAMINE 15 MG: 30 INJECTION, SOLUTION INTRAMUSCULAR at 11:42
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
## 2024-10-20 LAB
ATRIAL RATE: 93 BPM
P AXIS: 67 DEGREES
PR INTERVAL: 156 MS
QRS AXIS: -4 DEGREES
QRSD INTERVAL: 68 MS
QT INTERVAL: 398 MS
QTC INTERVAL: 494 MS
T WAVE AXIS: 50 DEGREES
VENTRICULAR RATE: 93 BPM

## 2024-10-20 PROCEDURE — 93010 ELECTROCARDIOGRAM REPORT: CPT | Performed by: INTERNAL MEDICINE

## 2024-10-24 ENCOUNTER — OFFICE VISIT (OUTPATIENT)
Dept: FAMILY MEDICINE CLINIC | Facility: CLINIC | Age: 54
End: 2024-10-24
Payer: COMMERCIAL

## 2024-10-24 VITALS
HEIGHT: 60 IN | HEART RATE: 84 BPM | WEIGHT: 152 LBS | BODY MASS INDEX: 29.84 KG/M2 | DIASTOLIC BLOOD PRESSURE: 84 MMHG | RESPIRATION RATE: 20 BRPM | TEMPERATURE: 96.8 F | SYSTOLIC BLOOD PRESSURE: 124 MMHG

## 2024-10-24 DIAGNOSIS — F25.0 SCHIZOAFFECTIVE DISORDER, BIPOLAR TYPE (HCC): Chronic | ICD-10-CM

## 2024-10-24 DIAGNOSIS — K21.9 GASTROESOPHAGEAL REFLUX DISEASE WITHOUT ESOPHAGITIS: Chronic | ICD-10-CM

## 2024-10-24 DIAGNOSIS — K59.01 SLOW TRANSIT CONSTIPATION: ICD-10-CM

## 2024-10-24 DIAGNOSIS — J40 BRONCHITIS: ICD-10-CM

## 2024-10-24 DIAGNOSIS — J01.01 ACUTE RECURRENT MAXILLARY SINUSITIS: Primary | ICD-10-CM

## 2024-10-24 PROCEDURE — 99214 OFFICE O/P EST MOD 30 MIN: CPT | Performed by: FAMILY MEDICINE

## 2024-10-24 RX ORDER — METHYLPREDNISOLONE 4 MG/1
TABLET ORAL
Qty: 21 EACH | Refills: 0 | Status: SHIPPED | OUTPATIENT
Start: 2024-10-24

## 2024-10-24 RX ORDER — DEXTROMETHORPHAN HYDROBROMIDE AND PROMETHAZINE HYDROCHLORIDE 15; 6.25 MG/5ML; MG/5ML
5 SYRUP ORAL 4 TIMES DAILY PRN
Qty: 180 ML | Refills: 0 | Status: SHIPPED | OUTPATIENT
Start: 2024-10-24

## 2024-10-24 RX ORDER — AZITHROMYCIN 250 MG/1
TABLET, FILM COATED ORAL
Qty: 6 TABLET | Refills: 0 | Status: SHIPPED | OUTPATIENT
Start: 2024-10-24 | End: 2024-10-29

## 2024-10-24 NOTE — PROGRESS NOTES
Ambulatory Visit  Name: Jo-Ann Lamb      : 1970      MRN: [id number]  Encounter Provider: Cipriano Lew DO  Encounter Date: 10/24/2024   Encounter department: [facility]    Assessment & Plan  Acute recurrent maxillary sinusitis  We will provide a Medrol Dosepak with Zithromax       Bronchitis  Promethazine DM will be used for symptomatic relief       Schizoaffective disorder, bipolar type (HCC)  At baseline followed by behavioral health       Slow transit constipation  We will refer to GI for evaluation       Gastroesophageal reflux disease without esophagitis  Patient is on Protonix 40 mg daily          History of Present Illness     Patient states she has had cold-like symptoms for several weeks she has sinus pressure postnasal drip and coughing up of a greenish phlegm    Sinus Problem  Associated symptoms include coughing and sinus pressure. Pertinent negatives include no chills, ear pain, shortness of breath or sore throat.         Review of Systems   Constitutional:  Negative for chills and fever.   HENT:  Positive for postnasal drip, rhinorrhea, sinus pressure and sinus pain. Negative for ear pain and sore throat.    Eyes:  Negative for pain and visual disturbance.   Respiratory:  Positive for cough. Negative for shortness of breath.    Cardiovascular:  Negative for chest pain and palpitations.   Gastrointestinal:  Negative for abdominal pain and vomiting.   Genitourinary:  Negative for dysuria and hematuria.   Musculoskeletal:  Negative for arthralgias and back pain.   Skin:  Negative for color change and rash.   Neurological:  Negative for seizures and syncope.   All other systems reviewed and are negative.          Objective     /84   Pulse 84   Temp (!) 96.8 °F (36 °C)   Resp 20   Ht 5' (1.524 m)   Wt 68.9 kg (152 lb)   LMP 2020   BMI 29.69 kg/m²     Physical Exam  Constitutional:       Appearance: Normal appearance.   HENT:      Head:  Normocephalic and atraumatic.      Right Ear: Tympanic membrane, ear canal and external ear normal.      Left Ear: Tympanic membrane, ear canal and external ear normal.      Nose: Nose normal.      Mouth/Throat:      Mouth: Mucous membranes are moist.      Pharynx: Oropharynx is clear.   Eyes:      Extraocular Movements: Extraocular movements intact.      Conjunctiva/sclera: Conjunctivae normal.      Pupils: Pupils are equal, round, and reactive to light.   Cardiovascular:      Rate and Rhythm: Normal rate and regular rhythm.      Pulses: Normal pulses.      Heart sounds: Normal heart sounds.   Pulmonary:      Effort: Pulmonary effort is normal.      Breath sounds: Rhonchi present.   Abdominal:      General: Abdomen is flat. Bowel sounds are normal.      Palpations: Abdomen is soft.   Musculoskeletal:         General: Normal range of motion.      Cervical back: Normal range of motion and neck supple.   Skin:     General: Skin is warm and dry.   Neurological:      General: No focal deficit present.      Mental Status: She is alert and oriented to person, place, and time.   Psychiatric:         Mood and Affect: Mood normal.         Behavior: Behavior normal.

## 2024-10-24 NOTE — PROGRESS NOTES
Ambulatory Visit  Name: Jo-Ann Lamb      : 1970      MRN: [id number]  Encounter Provider: Cipriano Lew DO  Encounter Date: 10/24/2024   Encounter department: [facility]    Assessment & Plan       History of Present Illness     Sinus Problem  Associated symptoms include coughing and sinus pressure. Pertinent negatives include no chills, ear pain, shortness of breath or sore throat.         Review of Systems   Constitutional:  Negative for chills and fever.   HENT:  Positive for postnasal drip, rhinorrhea and sinus pressure. Negative for ear pain and sore throat.    Eyes:  Negative for pain and visual disturbance.   Respiratory:  Positive for cough. Negative for shortness of breath.    Cardiovascular:  Negative for chest pain and palpitations.   Gastrointestinal:  Negative for abdominal pain and vomiting.   Genitourinary:  Negative for dysuria and hematuria.   Musculoskeletal:  Negative for arthralgias and back pain.   Skin:  Negative for color change and rash.   Neurological:  Negative for seizures and syncope.   All other systems reviewed and are negative.          Objective     /84   Pulse 84   Temp (!) 96.8 °F (36 °C)   Resp 20   Ht 5' (1.524 m)   Wt 68.9 kg (152 lb)   LMP 2020   BMI 29.69 kg/m²     Physical Exam  Constitutional:       Appearance: Normal appearance.   HENT:      Head: Normocephalic and atraumatic.      Right Ear: Tympanic membrane, ear canal and external ear normal.      Left Ear: Tympanic membrane, ear canal and external ear normal.      Nose: Nose normal.      Mouth/Throat:      Mouth: Mucous membranes are moist.      Pharynx: Oropharynx is clear.   Cardiovascular:      Rate and Rhythm: Normal rate and regular rhythm.      Pulses: Normal pulses.      Heart sounds: Normal heart sounds.   Pulmonary:      Effort: Pulmonary effort is normal.      Breath sounds: Normal breath sounds.   Abdominal:      General: Abdomen is flat. Bowel sounds  are normal.      Palpations: Abdomen is soft.   Musculoskeletal:         General: Normal range of motion.      Cervical back: Normal range of motion and neck supple.   Skin:     General: Skin is warm and dry.   Neurological:      General: No focal deficit present.      Mental Status: She is alert and oriented to person, place, and time.   Psychiatric:         Mood and Affect: Mood normal.         Behavior: Behavior normal.

## 2024-10-29 ENCOUNTER — TELEPHONE (OUTPATIENT)
Age: 54
End: 2024-10-29

## 2024-10-29 DIAGNOSIS — J40 BRONCHITIS: ICD-10-CM

## 2024-10-29 DIAGNOSIS — N32.81 OVERACTIVE BLADDER: ICD-10-CM

## 2024-10-29 DIAGNOSIS — J01.01 ACUTE RECURRENT MAXILLARY SINUSITIS: ICD-10-CM

## 2024-10-29 DIAGNOSIS — E55.9 VITAMIN D DEFICIENCY: ICD-10-CM

## 2024-10-29 DIAGNOSIS — F10.10 ALCOHOL ABUSE: ICD-10-CM

## 2024-10-29 DIAGNOSIS — R05.9 COUGH: ICD-10-CM

## 2024-10-29 DIAGNOSIS — E53.8 VITAMIN B12 DEFICIENCY: ICD-10-CM

## 2024-10-29 RX ORDER — GUAIFENESIN 1200 MG/1
1200 TABLET, EXTENDED RELEASE ORAL EVERY 12 HOURS SCHEDULED
Qty: 30 TABLET | Refills: 0 | Status: CANCELLED | OUTPATIENT
Start: 2024-10-29

## 2024-10-29 RX ORDER — METHYLPREDNISOLONE 4 MG/1
TABLET ORAL
Qty: 21 EACH | Refills: 0 | Status: CANCELLED | OUTPATIENT
Start: 2024-10-29

## 2024-10-29 RX ORDER — OXYBUTYNIN CHLORIDE 5 MG/1
5 TABLET ORAL 2 TIMES DAILY
Qty: 60 TABLET | Refills: 1 | Status: SHIPPED | OUTPATIENT
Start: 2024-10-29

## 2024-10-29 RX ORDER — UBIDECARENONE 100 MG
1000 CAPSULE ORAL DAILY
Qty: 90 CAPSULE | Refills: 1 | Status: SHIPPED | OUTPATIENT
Start: 2024-10-29

## 2024-10-29 RX ORDER — FOLIC ACID 1 MG/1
1 TABLET ORAL DAILY
Qty: 30 TABLET | Refills: 1 | Status: SHIPPED | OUTPATIENT
Start: 2024-10-29

## 2024-10-29 RX ORDER — DEXTROMETHORPHAN HYDROBROMIDE AND PROMETHAZINE HYDROCHLORIDE 15; 6.25 MG/5ML; MG/5ML
5 SYRUP ORAL 4 TIMES DAILY PRN
Qty: 180 ML | Refills: 0 | Status: CANCELLED | OUTPATIENT
Start: 2024-10-29

## 2024-10-29 RX ORDER — BENZONATATE 100 MG/1
100 CAPSULE ORAL 3 TIMES DAILY PRN
Qty: 20 CAPSULE | Refills: 0 | Status: CANCELLED | OUTPATIENT
Start: 2024-10-29

## 2024-10-29 NOTE — TELEPHONE ENCOUNTER
Patient is coughing up Phem and mucus. Going on for while. Pt thinks she has URI. PCP recommendation. pls advise

## 2024-10-30 ENCOUNTER — TELEPHONE (OUTPATIENT)
Age: 54
End: 2024-10-30

## 2024-10-30 ENCOUNTER — APPOINTMENT (OUTPATIENT)
Dept: LAB | Facility: CLINIC | Age: 54
End: 2024-10-30
Payer: COMMERCIAL

## 2024-10-30 NOTE — TELEPHONE ENCOUNTER
Pt called in just to see what medications were sent to her pharmacy. Triage nurse read them off to her. Nothing further to note.

## 2024-11-13 ENCOUNTER — TELEPHONE (OUTPATIENT)
Dept: GASTROENTEROLOGY | Facility: CLINIC | Age: 54
End: 2024-11-13

## 2024-11-13 NOTE — TELEPHONE ENCOUNTER
LMOM informing patient that her appt 11/22 with Thao Srinivasan is canceled. Provider is not in the office.Patient will need to reschedule.

## 2024-11-25 ENCOUNTER — OFFICE VISIT (OUTPATIENT)
Dept: FAMILY MEDICINE CLINIC | Facility: CLINIC | Age: 54
End: 2024-11-25
Payer: COMMERCIAL

## 2024-11-25 VITALS
SYSTOLIC BLOOD PRESSURE: 134 MMHG | RESPIRATION RATE: 16 BRPM | OXYGEN SATURATION: 93 % | TEMPERATURE: 97.5 F | BODY MASS INDEX: 30.43 KG/M2 | HEIGHT: 60 IN | DIASTOLIC BLOOD PRESSURE: 80 MMHG | WEIGHT: 155 LBS | HEART RATE: 100 BPM

## 2024-11-25 DIAGNOSIS — F25.0 SCHIZOAFFECTIVE DISORDER, BIPOLAR TYPE (HCC): ICD-10-CM

## 2024-11-25 DIAGNOSIS — J43.9 PULMONARY EMPHYSEMA, UNSPECIFIED EMPHYSEMA TYPE (HCC): ICD-10-CM

## 2024-11-25 DIAGNOSIS — J01.01 ACUTE RECURRENT MAXILLARY SINUSITIS: Primary | ICD-10-CM

## 2024-11-25 DIAGNOSIS — R07.9 CHEST PAIN IN ADULT: ICD-10-CM

## 2024-11-25 DIAGNOSIS — M51.360 DEGENERATION OF INTERVERTEBRAL DISC OF LUMBAR REGION WITH DISCOGENIC BACK PAIN: ICD-10-CM

## 2024-11-25 DIAGNOSIS — J40 BRONCHITIS: ICD-10-CM

## 2024-11-25 DIAGNOSIS — M94.0 COSTOCHONDRITIS: ICD-10-CM

## 2024-11-25 DIAGNOSIS — E55.9 VITAMIN D DEFICIENCY: ICD-10-CM

## 2024-11-25 PROCEDURE — 99214 OFFICE O/P EST MOD 30 MIN: CPT | Performed by: FAMILY MEDICINE

## 2024-11-25 RX ORDER — METHYLPREDNISOLONE 4 MG/1
TABLET ORAL
Qty: 21 EACH | Refills: 0 | Status: SHIPPED | OUTPATIENT
Start: 2024-11-25

## 2024-11-25 RX ORDER — DEXTROMETHORPHAN HYDROBROMIDE AND PROMETHAZINE HYDROCHLORIDE 15; 6.25 MG/5ML; MG/5ML
5 SYRUP ORAL 4 TIMES DAILY PRN
Qty: 180 ML | Refills: 0 | Status: SHIPPED | OUTPATIENT
Start: 2024-11-25

## 2024-11-25 RX ORDER — AZITHROMYCIN 250 MG/1
TABLET, FILM COATED ORAL
Qty: 6 TABLET | Refills: 0 | Status: SHIPPED | OUTPATIENT
Start: 2024-11-25 | End: 2024-11-30

## 2024-11-25 NOTE — ASSESSMENT & PLAN NOTE
The patient would like to follow with [facility] pain management for the treatment of degenerative disc disease.  Orders:    Ambulatory referral to Spine & Pain Management; Future

## 2024-11-25 NOTE — PROGRESS NOTES
Name: Jo-Ann Lamb      : 1970      MRN: [id number]  Encounter Provider: Cipriano Lew DO  Encounter Date: 2024   [facility]  :  Assessment & Plan  Acute recurrent maxillary sinusitis    Orders:    methylPREDNISolone 4 MG tablet therapy pack; Use as directed on package    promethazine-dextromethorphan (PHENERGAN-DM) 6.25-15 mg/5 mL oral syrup; Take 5 mL by mouth 4 (four) times a day as needed for cough    azithromycin (Zithromax) 250 mg tablet; Take 2 tablets (500 mg total) by mouth daily for 1 day, THEN 1 tablet (250 mg total) daily for 4 days.    Bronchitis  Patient complains of recurrent bronchitis she is coughing up yellowish phlegm.  Patient states that she gets bronchitis frequently.  Will obtain a chest x-ray a mycoplasma panel and a sputum C&S will provide a Medrol Dosepak Zithromax Z-Madhu and Promethazine DM  Orders:    methylPREDNISolone 4 MG tablet therapy pack; Use as directed on package    promethazine-dextromethorphan (PHENERGAN-DM) 6.25-15 mg/5 mL oral syrup; Take 5 mL by mouth 4 (four) times a day as needed for cough    Ambulatory referral to Spine & Pain Management; Future    CBC and differential; Future    Comprehensive metabolic panel; Future    Mycoplasma Pneumoniae AB, IgG/IgM; Future    XR chest pa and lateral; Future    Sputum culture and Gram stain; Future    Chest pain in adult  The patient has costochondral tenderness however the patient also is a heavy smoker with dyslipidemia.  The patient would not be able to walk 6 minutes uphill briskly and would not be able to walk on a treadmill will order a nuclear stress.   Orders:    ECG 12 lead; Future    NM myocardial perfusion spect (rx stress and/or rest); Future    Pulmonary emphysema, unspecified emphysema type (HCC)         Costochondritis  We will provide a Medrol Dosepak       Schizoaffective disorder, bipolar type (HCC)  Patient's behavioral health medications have   suggested that she contact behavioral health for renals       Degeneration of intervertebral disc of lumbar region with discogenic back pain  The patient would like to follow with [facility] pain management for the treatment of degenerative disc disease.  Orders:    Ambulatory referral to Spine & Pain Management; Future           History of Present Illness     Patient presents with cough and cold-like symptoms of 1 week duration the cough is productive of yellowish phlegm.  The patient also complains of sharp stabbing chest pains and pain in the longus.      Review of Systems   Constitutional:  Negative for chills and fever.   HENT:  Negative for ear pain and sore throat.    Eyes:  Negative for pain and visual disturbance.   Respiratory:  Positive for cough, chest tightness, shortness of breath and wheezing.    Cardiovascular:  Positive for chest pain. Negative for palpitations.   Gastrointestinal:  Negative for abdominal pain and vomiting.   Genitourinary:  Negative for dysuria and hematuria.   Musculoskeletal:  Negative for arthralgias and back pain.   Skin:  Negative for color change and rash.   Neurological:  Negative for seizures and syncope.   All other systems reviewed and are negative.       [facility]  Outside Information  Results  MRI lumbar spine wo contrast (Order [id number])     MRI lumbar spine wo contrast  Order: [id number]  Impression    IMPRESSION:  L5-S1 disc bulge and bilateral facet joint arthrosis with moderate bilateral  neuroforaminal narrowing and mild spinal canal narrowing.              Workstation:[id number]  Narrative    History: Chronic low back pain    Procedure: MRI of the lumbar spine was obtained with the following sequences:  Sagittal T1, sagittal T2, sagittal STIR and axial T2 weighted images.    Findings: For the purposes of this dictation, the lumbar vertebrae are labeled  from a caudal to cranial direction, the first vertebra with lumbar morphology  is  labeled as L5.    Lumbar lordosis and alignment are within normal limits. Vertebral body heights  are maintained. There are mixed fatty marrow signal change and marrow edema of  the opposing endplates of L5-S1. This is compatible with degenerative change.    Conus medullaris ends at L1-L2 level. There is no definite abnormal signal of  conus medullaris allowing for artifact. The nerve roots of cauda equina appear  within normal limits.    L1-L2: No disc herniation, spinal stenosis or neuroforaminal narrowing.    L2-L3: No disc herniation, spinal stenosis or neuroforaminal narrowing.    L3-L4: No disc herniation, spinal stenosis or neuroforaminal narrowing.    L4-L5: Mild disc bulge. No significant spinal stenosis or neuroforaminal  narrowing.    L5-S1: Disc dehydration. Slight disc space narrowing. Disc bulge and bilateral  facet joint arthrosis causing moderate bilateral neuroforaminal narrowing and  mild spinal canal narrowing.  Exam End: 11/18/24  8:19 AM    Specimen Collected: 11/18/24  8:54 AM Last Resulted: 11/18/24  9:05 AM   Received From: [facility]  Result Received: 11/19/24  8:11 AM    View Encounter        Received Information             Objective   /80   Pulse 100   Temp 97.5 °F (36.4 °C)   Resp 16   Ht 5' (1.524 m)   Wt 70.3 kg (155 lb)   LMP 01/01/2020   SpO2 93%   BMI 30.27 kg/m²      Physical Exam  Constitutional:       Appearance: Normal appearance.   HENT:      Head: Normocephalic and atraumatic.      Right Ear: Tympanic membrane, ear canal and external ear normal.      Left Ear: Tympanic membrane, ear canal and external ear normal.      Nose: Nose normal.      Mouth/Throat:      Mouth: Mucous membranes are moist.      Pharynx: Oropharynx is clear.   Eyes:      Extraocular Movements: Extraocular movements intact.      Conjunctiva/sclera: Conjunctivae normal.      Pupils: Pupils are equal, round, and reactive to light.   Cardiovascular:      Rate and Rhythm:  Normal rate and regular rhythm.      Pulses: Normal pulses.      Heart sounds: Normal heart sounds.      Comments: Costochondral tenderness  Pulmonary:      Effort: Pulmonary effort is normal.      Breath sounds: Rhonchi present.   Abdominal:      General: Abdomen is flat. Bowel sounds are normal.      Palpations: Abdomen is soft.   Musculoskeletal:         General: Normal range of motion.      Cervical back: Normal range of motion and neck supple.   Skin:     General: Skin is warm and dry.   Neurological:      General: No focal deficit present.      Mental Status: She is alert and oriented to person, place, and time.   Psychiatric:         Mood and Affect: Mood normal.         Behavior: Behavior normal.

## 2024-11-25 NOTE — ASSESSMENT & PLAN NOTE
Patient's behavioral health medications have  suggested that she contact behavioral health for renals

## 2024-11-27 ENCOUNTER — TELEPHONE (OUTPATIENT)
Age: 54
End: 2024-11-27

## 2024-11-27 NOTE — TELEPHONE ENCOUNTER
Caller: Patient    Doctor: LEV    Reason for call: Patient seeking med management. Please send list of other pain management providers in the area.    Call back#: [phone]

## 2024-12-02 NOTE — TELEPHONE ENCOUNTER
Caller: Jo-Ann pt    Doctor:      Reason for call: pt was expecting a phone call back (not sure why).  I made pt aware that the pain provider list was mailed on 11/27 and she should be receiving it, in the next day or two    Call back#:

## 2024-12-03 ENCOUNTER — APPOINTMENT (OUTPATIENT)
Dept: LAB | Facility: CLINIC | Age: 54
End: 2024-12-03
Payer: COMMERCIAL

## 2024-12-03 ENCOUNTER — APPOINTMENT (OUTPATIENT)
Dept: RADIOLOGY | Facility: CLINIC | Age: 54
End: 2024-12-03
Payer: COMMERCIAL

## 2024-12-03 DIAGNOSIS — J40 BRONCHITIS: ICD-10-CM

## 2024-12-03 DIAGNOSIS — E55.9 VITAMIN D DEFICIENCY: ICD-10-CM

## 2024-12-03 DIAGNOSIS — K21.9 GASTROESOPHAGEAL REFLUX DISEASE WITHOUT ESOPHAGITIS: ICD-10-CM

## 2024-12-03 DIAGNOSIS — R10.13 EPIGASTRIC PAIN: ICD-10-CM

## 2024-12-03 LAB
ALBUMIN SERPL BCG-MCNC: 4.4 G/DL (ref 3.5–5)
ALP SERPL-CCNC: 88 U/L (ref 34–104)
ALT SERPL W P-5'-P-CCNC: 15 U/L (ref 7–52)
ANION GAP SERPL CALCULATED.3IONS-SCNC: 11 MMOL/L (ref 4–13)
AST SERPL W P-5'-P-CCNC: 17 U/L (ref 13–39)
BASOPHILS # BLD AUTO: 0.06 THOUSANDS/ÂΜL (ref 0–0.1)
BASOPHILS NFR BLD AUTO: 0 % (ref 0–1)
BILIRUB SERPL-MCNC: 0.38 MG/DL (ref 0.2–1)
BUN SERPL-MCNC: 6 MG/DL (ref 5–25)
CALCIUM SERPL-MCNC: 9.4 MG/DL (ref 8.4–10.2)
CHLORIDE SERPL-SCNC: 92 MMOL/L (ref 96–108)
CO2 SERPL-SCNC: 28 MMOL/L (ref 21–32)
CREAT SERPL-MCNC: 0.76 MG/DL (ref 0.6–1.3)
EOSINOPHIL # BLD AUTO: 0.35 THOUSAND/ÂΜL (ref 0–0.61)
EOSINOPHIL NFR BLD AUTO: 2 % (ref 0–6)
ERYTHROCYTE [DISTWIDTH] IN BLOOD BY AUTOMATED COUNT: 15.3 % (ref 11.6–15.1)
GFR SERPL CREATININE-BSD FRML MDRD: 89 ML/MIN/1.73SQ M
GLUCOSE SERPL-MCNC: 93 MG/DL (ref 65–140)
HCT VFR BLD AUTO: 42.8 % (ref 34.8–46.1)
HGB BLD-MCNC: 14 G/DL (ref 11.5–15.4)
IMM GRANULOCYTES # BLD AUTO: 0.22 THOUSAND/UL (ref 0–0.2)
IMM GRANULOCYTES NFR BLD AUTO: 1 % (ref 0–2)
LYMPHOCYTES # BLD AUTO: 2.48 THOUSANDS/ÂΜL (ref 0.6–4.47)
LYMPHOCYTES NFR BLD AUTO: 14 % (ref 14–44)
MCH RBC QN AUTO: 29.5 PG (ref 26.8–34.3)
MCHC RBC AUTO-ENTMCNC: 32.7 G/DL (ref 31.4–37.4)
MCV RBC AUTO: 90 FL (ref 82–98)
MONOCYTES # BLD AUTO: 0.83 THOUSAND/ÂΜL (ref 0.17–1.22)
MONOCYTES NFR BLD AUTO: 5 % (ref 4–12)
NEUTROPHILS # BLD AUTO: 13.59 THOUSANDS/ÂΜL (ref 1.85–7.62)
NEUTS SEG NFR BLD AUTO: 78 % (ref 43–75)
NRBC BLD AUTO-RTO: 0 /100 WBCS
PLATELET # BLD AUTO: 274 THOUSANDS/UL (ref 149–390)
PMV BLD AUTO: 9.5 FL (ref 8.9–12.7)
POTASSIUM SERPL-SCNC: 3.4 MMOL/L (ref 3.5–5.3)
PROT SERPL-MCNC: 6.9 G/DL (ref 6.4–8.4)
RBC # BLD AUTO: 4.75 MILLION/UL (ref 3.81–5.12)
SODIUM SERPL-SCNC: 131 MMOL/L (ref 135–147)
WBC # BLD AUTO: 17.53 THOUSAND/UL (ref 4.31–10.16)

## 2024-12-03 PROCEDURE — 36415 COLL VENOUS BLD VENIPUNCTURE: CPT

## 2024-12-03 PROCEDURE — 85025 COMPLETE CBC W/AUTO DIFF WBC: CPT

## 2024-12-03 PROCEDURE — 80053 COMPREHEN METABOLIC PANEL: CPT

## 2024-12-03 PROCEDURE — 86738 MYCOPLASMA ANTIBODY: CPT

## 2024-12-03 PROCEDURE — 82306 VITAMIN D 25 HYDROXY: CPT

## 2024-12-03 PROCEDURE — 71046 X-RAY EXAM CHEST 2 VIEWS: CPT

## 2024-12-04 ENCOUNTER — RESULTS FOLLOW-UP (OUTPATIENT)
Dept: FAMILY MEDICINE CLINIC | Facility: CLINIC | Age: 54
End: 2024-12-04

## 2024-12-04 ENCOUNTER — TELEPHONE (OUTPATIENT)
Age: 54
End: 2024-12-04

## 2024-12-04 DIAGNOSIS — M54.50 CHRONIC BILATERAL LOW BACK PAIN WITHOUT SCIATICA: ICD-10-CM

## 2024-12-04 DIAGNOSIS — M51.360 DEGENERATION OF INTERVERTEBRAL DISC OF LUMBAR REGION WITH DISCOGENIC BACK PAIN: Primary | ICD-10-CM

## 2024-12-04 DIAGNOSIS — G89.29 CHRONIC BILATERAL LOW BACK PAIN WITHOUT SCIATICA: ICD-10-CM

## 2024-12-04 DIAGNOSIS — M54.9 CHRONIC BACK PAIN, UNSPECIFIED BACK LOCATION, UNSPECIFIED BACK PAIN LATERALITY: ICD-10-CM

## 2024-12-04 DIAGNOSIS — G89.29 CHRONIC BACK PAIN, UNSPECIFIED BACK LOCATION, UNSPECIFIED BACK PAIN LATERALITY: ICD-10-CM

## 2024-12-04 LAB
M PNEUMO IGG SER IA-ACNC: 191 U/ML (ref 0–99)
M PNEUMO IGM SER IA-ACNC: <770 U/ML (ref 0–769)

## 2024-12-04 NOTE — TELEPHONE ENCOUNTER
Pt called in to report she missed a call regarding her labs and xray. Pt would like the results. Per PCPs message triage nurse informed pt she needed an appt with PCP to go over her lab results. Pt verbalized understanding and made an appt for 12/13.

## 2024-12-04 NOTE — TELEPHONE ENCOUNTER
Pt called stating the pain management doctor she was referred to in [place] is her old doctor and she does not wish to go there. Also, states she does not want to see the pain management doctor in [place] either. Asking for other recommendations.

## 2024-12-05 LAB — 25(OH)D3 SERPL-MCNC: 44.9 NG/ML (ref 30–100)

## 2024-12-06 NOTE — TELEPHONE ENCOUNTER
Pt reports she has the fax # for the pain management([facility] referral to be faxed to. Please fax the referral to #[phone].   Pt is requesting PCP to please call pt with next steps, with this issue.  Also, pt wanted to know if when the referral is sent over if the office can also add on the request for the new Pain management office can request pts medical records from her old pain management office.   Triage nurse explained to pt that it doesn't typically work that way. The new pain management office would have request this on their own and need pts approval but not through the PCP office.   PCP please further advise pt on this and what the proper process is.  Thank you

## 2024-12-06 NOTE — TELEPHONE ENCOUNTER
Caller: Patient     Doctor: LEV     Reason for call: Calling to see if list was mailed. Advised it was and advised to call back if does not receive it by next week     Call back#: n/a

## 2024-12-12 ENCOUNTER — HOSPITAL ENCOUNTER (OUTPATIENT)
Dept: NON INVASIVE DIAGNOSTICS | Facility: HOSPITAL | Age: 54
Discharge: HOME | End: 2024-12-12
Attending: FAMILY MEDICINE

## 2024-12-12 NOTE — NURSING NOTE
Caller: Aida Mcclure April    Relationship: Self    Best call back number: [phone]    What is the best time to reach you: ANYTIME    Who are you requesting to speak with (clinical staff, provider,  specific staff member): NURSE     What was the call regarding: PATIENT IS WANTING TO SEE IF SHE COULD MOVE HER APPOINTMENT UP TOMORROW ABOUT AN HOUR, SHE DOES NOT HAVE A CAR AT THIS TIME AND IS DEPENDING ON SOMEONE TO BRING HER IN.      HUB ATTEMPTED WARM TRANSFER.      Alert and visible intermittently  No SI or HI noted  Denies depression, anxiety and pain  Pt noted talking to self upon entering pt's room  Consumed 100% of breakfast and 100% of lunch  Took all medication without prompting  Maintained on safe precautions without incident   Will continue to monitor progress and recovery program

## 2024-12-13 ENCOUNTER — OFFICE VISIT (OUTPATIENT)
Dept: LAB | Facility: HOSPITAL | Age: 54
End: 2024-12-13
Payer: COMMERCIAL

## 2024-12-13 DIAGNOSIS — R07.9 CHEST PAIN IN ADULT: ICD-10-CM

## 2024-12-13 LAB
ATRIAL RATE: 99 BPM
P AXIS: 49 DEGREES
PR INTERVAL: 148 MS
QRS AXIS: -8 DEGREES
QRSD INTERVAL: 72 MS
QT INTERVAL: 358 MS
QTC INTERVAL: 460 MS
T WAVE AXIS: 37 DEGREES
VENTRICULAR RATE: 99 BPM

## 2024-12-13 PROCEDURE — 93010 ELECTROCARDIOGRAM REPORT: CPT | Performed by: INTERNAL MEDICINE

## 2024-12-13 PROCEDURE — 93005 ELECTROCARDIOGRAM TRACING: CPT

## 2024-12-17 ENCOUNTER — TELEPHONE (OUTPATIENT)
Dept: FAMILY MEDICINE CLINIC | Facility: CLINIC | Age: 54
End: 2024-12-17

## 2024-12-17 ENCOUNTER — NURSE TRIAGE (OUTPATIENT)
Age: 54
End: 2024-12-17
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
## 2024-12-20 ENCOUNTER — TELEPHONE (OUTPATIENT)
Age: 54
End: 2024-12-20

## 2024-12-20 DIAGNOSIS — B37.0 THRUSH: Primary | ICD-10-CM

## 2024-12-20 DIAGNOSIS — E53.8 VITAMIN B12 DEFICIENCY: ICD-10-CM

## 2024-12-20 DIAGNOSIS — J01.01 ACUTE RECURRENT MAXILLARY SINUSITIS: ICD-10-CM

## 2024-12-20 DIAGNOSIS — N32.81 OVERACTIVE BLADDER: ICD-10-CM

## 2024-12-20 DIAGNOSIS — J40 BRONCHITIS: ICD-10-CM

## 2024-12-20 RX ORDER — DEXTROMETHORPHAN HYDROBROMIDE AND PROMETHAZINE HYDROCHLORIDE 15; 6.25 MG/5ML; MG/5ML
5 SYRUP ORAL 4 TIMES DAILY PRN
Qty: 180 ML | Refills: 0 | Status: SHIPPED | OUTPATIENT
Start: 2024-12-20 | End: 2024-12-23 | Stop reason: SDUPTHER

## 2024-12-20 NOTE — TELEPHONE ENCOUNTER
Called patient with same and if no better be checked through [facility] or apt in office    Arava Counseling:  Patient counseled regarding adverse effects of Arava including but not limited to nausea, vomiting, abnormalities in liver function tests. Patients may develop mouth sores, rash, diarrhea, and abnormalities in blood counts. The patient understands that monitoring is required including LFTs and blood counts.  There is a rare possibility of scarring of the liver and lung problems that can occur when taking methotrexate. Persistent nausea, loss of appetite, pale stools, dark urine, cough, and shortness of breath should be reported immediately. Patient advised to discontinue Arava treatment and consult with a physician prior to attempting conception. The patient will have to undergo a treatment to eliminate Arava from the body prior to conception.

## 2024-12-20 NOTE — TELEPHONE ENCOUNTER
Pt called in to check on an update on medications being sent to her pharmacy. Triage nurse looked into the encounters and med list. Medication wasn't sent as of now. Triage nurse called the office for further assistance and office staff stated they will take care of this for the pt. Pt verbalized understanding.

## 2024-12-20 NOTE — TELEPHONE ENCOUNTER
Patient states she is not feeling well and is asking for medication. Patient canceled her appointment due to the weather but is asking to please send something to help with her chest congestion

## 2024-12-23 RX ORDER — DEXTROMETHORPHAN HYDROBROMIDE AND PROMETHAZINE HYDROCHLORIDE 15; 6.25 MG/5ML; MG/5ML
5 SYRUP ORAL 4 TIMES DAILY PRN
Qty: 180 ML | Refills: 0 | Status: SHIPPED | OUTPATIENT
Start: 2024-12-23

## 2024-12-23 RX ORDER — OXYBUTYNIN CHLORIDE 5 MG/1
5 TABLET ORAL 2 TIMES DAILY
Qty: 60 TABLET | Refills: 5 | Status: SHIPPED | OUTPATIENT
Start: 2024-12-23

## 2024-12-23 RX ORDER — CLOTRIMAZOLE 10 MG/1
10 LOZENGE ORAL
Qty: 25 TABLET | Refills: 0 | Status: SHIPPED | OUTPATIENT
Start: 2024-12-23 | End: 2024-12-28

## 2024-12-23 RX ORDER — MULTIVITAMIN WITH IRON
500 TABLET ORAL DAILY
Qty: 30 TABLET | Refills: 5 | Status: SHIPPED | OUTPATIENT
Start: 2024-12-23

## 2024-12-23 NOTE — TELEPHONE ENCOUNTER
Offered apt for today - patient unable to get in - to short of notice for a ride     Thrush / yeast infection in mouth from inhalers - bad sore throat

## 2024-12-23 NOTE — TELEPHONE ENCOUNTER
Pt called in stating she called her pharmacy for the medication, but they informed her they hadn't received any script.     Appears script was sent to an incorrect pharmacy:  Nicola FOSTER ([facility]) - [place], [place] - [address]. [phone]     Pt is requesting if Dr. Lew could please send the script to her pharmacy:  [id number] - [place], [place] - [address] [place] [address] [phone]     Pt also wanted to inform Dr. Lew, that she has thrush & a really bad sore throat.     Please advise & call pt back with update. Thank you!    Jo-Ann Lamb J: [phone]

## 2025-01-10 ENCOUNTER — APPOINTMENT (OUTPATIENT)
Dept: LAB | Facility: CLINIC | Age: 55
End: 2025-01-10
Payer: COMMERCIAL

## 2025-01-10 DIAGNOSIS — Z79.899 ENCOUNTER FOR LONG-TERM (CURRENT) USE OF MEDICATIONS: ICD-10-CM

## 2025-01-10 LAB
BASOPHILS # BLD AUTO: 0.04 THOUSANDS/ΜL (ref 0–0.1)
BASOPHILS NFR BLD AUTO: 0 % (ref 0–1)
EOSINOPHIL # BLD AUTO: 0.47 THOUSAND/ΜL (ref 0–0.61)
EOSINOPHIL NFR BLD AUTO: 4 % (ref 0–6)
ERYTHROCYTE [DISTWIDTH] IN BLOOD BY AUTOMATED COUNT: 15.1 % (ref 11.6–15.1)
HCT VFR BLD AUTO: 42.4 % (ref 34.8–46.1)
HGB BLD-MCNC: 13.5 G/DL (ref 11.5–15.4)
IMM GRANULOCYTES # BLD AUTO: 0.07 THOUSAND/UL (ref 0–0.2)
IMM GRANULOCYTES NFR BLD AUTO: 1 % (ref 0–2)
LYMPHOCYTES # BLD AUTO: 2.01 THOUSANDS/ΜL (ref 0.6–4.47)
LYMPHOCYTES NFR BLD AUTO: 18 % (ref 14–44)
MCH RBC QN AUTO: 29.8 PG (ref 26.8–34.3)
MCHC RBC AUTO-ENTMCNC: 31.8 G/DL (ref 31.4–37.4)
MCV RBC AUTO: 94 FL (ref 82–98)
MONOCYTES # BLD AUTO: 0.58 THOUSAND/ΜL (ref 0.17–1.22)
MONOCYTES NFR BLD AUTO: 5 % (ref 4–12)
NEUTROPHILS # BLD AUTO: 7.91 THOUSANDS/ΜL (ref 1.85–7.62)
NEUTS SEG NFR BLD AUTO: 72 % (ref 43–75)
NRBC BLD AUTO-RTO: 0 /100 WBCS
PLATELET # BLD AUTO: 269 THOUSANDS/UL (ref 149–390)
PMV BLD AUTO: 9.4 FL (ref 8.9–12.7)
RBC # BLD AUTO: 4.53 MILLION/UL (ref 3.81–5.12)
WBC # BLD AUTO: 11.08 THOUSAND/UL (ref 4.31–10.16)

## 2025-01-10 PROCEDURE — 36415 COLL VENOUS BLD VENIPUNCTURE: CPT

## 2025-01-10 PROCEDURE — 85025 COMPLETE CBC W/AUTO DIFF WBC: CPT

## 2025-01-14 NOTE — PROGRESS NOTES
Pt was admitted to [facility] with the following belongings.     In Pt's room out for use:  2 pair of glasses  1 pink and white long shirt/dress  1 peach shirt  1 peach colored pants  1Red leggings with multi colors   1Pink and purple flower night gown  1Black and white leggings  1 white sports bra  1 white bra  1 pink pair of PJ pants  5 pair underpants  1 black and orange leggings  1 brown shirt    In PT's Locked cabinet 206-2  1 pink bag personal hygiene items  1 burgandy dress  1 yellow tank top  1 pink tank top  1 red tank top  1 5 pair socks  1 black and yellow bag  1 1 pair gray shoes  1 1 clip on sunglasses in eye class case  1 pair sunglass    In locked contraband behind BHT Basket #6  1 lighter  5 cigarettes   4 receipts     In hospital safe in safe bag #[id number]  1 brown wallet  1 black wallet  1 [place]  I.D.   1 S.S Card  1 direct express master card  1 medical card  2 lottery tickets  $82.38  1 visa gift card  used

## 2025-01-20 DIAGNOSIS — K59.01 SLOW TRANSIT CONSTIPATION: ICD-10-CM

## 2025-01-20 DIAGNOSIS — R09.81 CONGESTION OF NASAL SINUS: ICD-10-CM

## 2025-01-20 RX ORDER — FLUTICASONE PROPIONATE 50 MCG
1 SPRAY, SUSPENSION (ML) NASAL DAILY
Qty: 16 G | Refills: 0 | Status: ON HOLD | OUTPATIENT
Start: 2025-01-20

## 2025-01-20 RX ORDER — AMOXICILLIN 250 MG
1 CAPSULE ORAL 2 TIMES DAILY
Qty: 60 TABLET | Refills: 0 | Status: ON HOLD | OUTPATIENT
Start: 2025-01-20 | End: 2025-02-19

## 2025-01-20 NOTE — TELEPHONE ENCOUNTER
Medication: fluticasone (FLONASE) 50 mcg/act nasal spray     Dose/Frequency:   1 spray into each nostril daily        Quantity:  16 g     Pharmacy: [facility]/pharmacy [address] [place] [address]    Office:   [] PCP/Provider -   [x] Speciality/Provider - Jose Steel MD     Does the patient have enough for 3 days?   [] Yes   [x] No - Send as HP to POD        Medication: senna-docusate sodium (SENOKOT S) 8.6-50 mg per tablet     Dose/Frequency: Take 1 tablet by mouth 2 (two) times a day     Quantity: 60 tablet     Pharmacy: [facility]/pharmacy [address] [place] [address]    Office:   [] PCP/Provider -   [x] Speciality/Provider - : Jose Steel MD     Does the patient have enough for 3 days?   [] Yes   [x] No - Send as HP to POD        Patient is asking for medication to be sent in for her cholesterol due to recent blood work. Please advise.

## 2025-01-21 ENCOUNTER — TELEPHONE (OUTPATIENT)
Age: 55
End: 2025-01-21

## 2025-01-21 NOTE — TELEPHONE ENCOUNTER
"Patient calling again with continued complaints of \"bronchial asthma\" and other various symptoms to request steroid and decongestant. Patient states that Dr. Lew has prescribed them previously. She would like to have enough to last until appointment on 2/3/25. Patient states in addition to her asthma issue that her \"kidneys are hurting me\". Asked to elaborate further and patient does not describe further. Please advise.  "

## 2025-01-24 ENCOUNTER — HOSPITAL ENCOUNTER (EMERGENCY)
Facility: HOSPITAL | Age: 55
Discharge: OTHER INSTITUTION | End: 2025-01-24
Attending: EMERGENCY MEDICINE
Payer: COMMERCIAL

## 2025-01-24 ENCOUNTER — HOSPITAL ENCOUNTER (INPATIENT)
Facility: HOSPITAL | Age: 55
LOS: 7 days | Discharge: HOME | DRG: 750 | End: 2025-01-31
Attending: PSYCHIATRY & NEUROLOGY | Admitting: STUDENT IN AN ORGANIZED HEALTH CARE EDUCATION/TRAINING PROGRAM
Payer: COMMERCIAL

## 2025-01-24 VITALS
SYSTOLIC BLOOD PRESSURE: 159 MMHG | RESPIRATION RATE: 18 BRPM | HEART RATE: 80 BPM | DIASTOLIC BLOOD PRESSURE: 95 MMHG | OXYGEN SATURATION: 97 % | BODY MASS INDEX: 30.43 KG/M2 | TEMPERATURE: 97.7 F | HEIGHT: 60 IN | WEIGHT: 155 LBS

## 2025-01-24 DIAGNOSIS — J43.9 PULMONARY EMPHYSEMA (HCC): ICD-10-CM

## 2025-01-24 DIAGNOSIS — M54.50 CHRONIC MIDLINE LOW BACK PAIN WITHOUT SCIATICA: ICD-10-CM

## 2025-01-24 DIAGNOSIS — E78.2 MIXED HYPERLIPIDEMIA: ICD-10-CM

## 2025-01-24 DIAGNOSIS — K59.01 SLOW TRANSIT CONSTIPATION: ICD-10-CM

## 2025-01-24 DIAGNOSIS — K64.9 HEMORRHOIDS, UNSPECIFIED HEMORRHOID TYPE: ICD-10-CM

## 2025-01-24 DIAGNOSIS — E78.5 HYPERLIPIDEMIA: ICD-10-CM

## 2025-01-24 DIAGNOSIS — F32.A DEPRESSION WITH SUICIDAL IDEATION: Primary | ICD-10-CM

## 2025-01-24 DIAGNOSIS — J43.9 PULMONARY EMPHYSEMA, UNSPECIFIED EMPHYSEMA TYPE (HCC): ICD-10-CM

## 2025-01-24 DIAGNOSIS — F25.0 SCHIZOAFFECTIVE DISORDER, BIPOLAR TYPE (HCC): Primary | Chronic | ICD-10-CM

## 2025-01-24 DIAGNOSIS — R45.851 DEPRESSION WITH SUICIDAL IDEATION: Primary | ICD-10-CM

## 2025-01-24 DIAGNOSIS — G89.29 CHRONIC MIDLINE LOW BACK PAIN WITHOUT SCIATICA: ICD-10-CM

## 2025-01-24 DIAGNOSIS — Z78.9 ALCOHOL USE: ICD-10-CM

## 2025-01-24 LAB
ALBUMIN SERPL BCG-MCNC: 4.4 G/DL (ref 3.5–5)
ALP SERPL-CCNC: 97 U/L (ref 34–104)
ALT SERPL W P-5'-P-CCNC: 17 U/L (ref 7–52)
AMPHETAMINES SERPL QL SCN: POSITIVE
ANION GAP SERPL CALCULATED.3IONS-SCNC: 11 MMOL/L (ref 4–13)
AST SERPL W P-5'-P-CCNC: 18 U/L (ref 13–39)
BARBITURATES UR QL: NEGATIVE
BASOPHILS # BLD AUTO: 0.04 THOUSANDS/ΜL (ref 0–0.1)
BASOPHILS # BLD AUTO: 0.05 THOUSANDS/ΜL (ref 0–0.1)
BASOPHILS NFR BLD AUTO: 1 % (ref 0–1)
BASOPHILS NFR BLD AUTO: 1 % (ref 0–1)
BENZODIAZ UR QL: NEGATIVE
BILIRUB SERPL-MCNC: 0.21 MG/DL (ref 0.2–1)
BILIRUB UR QL STRIP: NEGATIVE
BUN SERPL-MCNC: 8 MG/DL (ref 5–25)
CALCIUM SERPL-MCNC: 8.7 MG/DL (ref 8.4–10.2)
CHLORIDE SERPL-SCNC: 96 MMOL/L (ref 96–108)
CLARITY UR: CLEAR
CO2 SERPL-SCNC: 26 MMOL/L (ref 21–32)
COCAINE UR QL: NEGATIVE
COLOR UR: NORMAL
CREAT SERPL-MCNC: 0.82 MG/DL (ref 0.6–1.3)
EOSINOPHIL # BLD AUTO: 0.26 THOUSAND/ΜL (ref 0–0.61)
EOSINOPHIL # BLD AUTO: 0.39 THOUSAND/ΜL (ref 0–0.61)
EOSINOPHIL NFR BLD AUTO: 2 % (ref 0–6)
EOSINOPHIL NFR BLD AUTO: 5 % (ref 0–6)
ERYTHROCYTE [DISTWIDTH] IN BLOOD BY AUTOMATED COUNT: 13.8 % (ref 11.6–15.1)
ERYTHROCYTE [DISTWIDTH] IN BLOOD BY AUTOMATED COUNT: 13.9 % (ref 11.6–15.1)
ETHANOL EXG-MCNC: NORMAL MG/DL
FENTANYL UR QL SCN: NEGATIVE
GFR SERPL CREATININE-BSD FRML MDRD: 81 ML/MIN/1.73SQ M
GLUCOSE SERPL-MCNC: 137 MG/DL (ref 65–140)
GLUCOSE UR STRIP-MCNC: NEGATIVE MG/DL
HCT VFR BLD AUTO: 40.7 % (ref 34.8–46.1)
HCT VFR BLD AUTO: 41.5 % (ref 34.8–46.1)
HGB BLD-MCNC: 13.1 G/DL (ref 11.5–15.4)
HGB BLD-MCNC: 13.4 G/DL (ref 11.5–15.4)
HGB UR QL STRIP.AUTO: NEGATIVE
HYDROCODONE UR QL SCN: NEGATIVE
IMM GRANULOCYTES # BLD AUTO: 0.05 THOUSAND/UL (ref 0–0.2)
IMM GRANULOCYTES # BLD AUTO: 0.06 THOUSAND/UL (ref 0–0.2)
IMM GRANULOCYTES NFR BLD AUTO: 1 % (ref 0–2)
IMM GRANULOCYTES NFR BLD AUTO: 1 % (ref 0–2)
KETONES UR STRIP-MCNC: NEGATIVE MG/DL
LEUKOCYTE ESTERASE UR QL STRIP: NEGATIVE
LYMPHOCYTES # BLD AUTO: 1.71 THOUSANDS/ΜL (ref 0.6–4.47)
LYMPHOCYTES # BLD AUTO: 2.43 THOUSANDS/ΜL (ref 0.6–4.47)
LYMPHOCYTES NFR BLD AUTO: 15 % (ref 14–44)
LYMPHOCYTES NFR BLD AUTO: 28 % (ref 14–44)
MCH RBC QN AUTO: 30 PG (ref 26.8–34.3)
MCH RBC QN AUTO: 30.1 PG (ref 26.8–34.3)
MCHC RBC AUTO-ENTMCNC: 32.2 G/DL (ref 31.4–37.4)
MCHC RBC AUTO-ENTMCNC: 32.3 G/DL (ref 31.4–37.4)
MCV RBC AUTO: 93 FL (ref 82–98)
MCV RBC AUTO: 93 FL (ref 82–98)
METHADONE UR QL: NEGATIVE
MONOCYTES # BLD AUTO: 0.56 THOUSAND/ΜL (ref 0.17–1.22)
MONOCYTES # BLD AUTO: 0.57 THOUSAND/ΜL (ref 0.17–1.22)
MONOCYTES NFR BLD AUTO: 5 % (ref 4–12)
MONOCYTES NFR BLD AUTO: 7 % (ref 4–12)
NEUTROPHILS # BLD AUTO: 5.2 THOUSANDS/ΜL (ref 1.85–7.62)
NEUTROPHILS # BLD AUTO: 8.45 THOUSANDS/ΜL (ref 1.85–7.62)
NEUTS SEG NFR BLD AUTO: 58 % (ref 43–75)
NEUTS SEG NFR BLD AUTO: 76 % (ref 43–75)
NITRITE UR QL STRIP: NEGATIVE
NRBC BLD AUTO-RTO: 0 /100 WBCS
NRBC BLD AUTO-RTO: 0 /100 WBCS
OPIATES UR QL SCN: NEGATIVE
OXYCODONE+OXYMORPHONE UR QL SCN: NEGATIVE
PCP UR QL: NEGATIVE
PH UR STRIP.AUTO: 7.5 [PH]
PLATELET # BLD AUTO: 256 THOUSANDS/UL (ref 149–390)
PLATELET # BLD AUTO: 279 THOUSANDS/UL (ref 149–390)
PMV BLD AUTO: 8.9 FL (ref 8.9–12.7)
PMV BLD AUTO: 9.5 FL (ref 8.9–12.7)
POTASSIUM SERPL-SCNC: 3.7 MMOL/L (ref 3.5–5.3)
PROT SERPL-MCNC: 6.8 G/DL (ref 6.4–8.4)
PROT UR STRIP-MCNC: NEGATIVE MG/DL
RBC # BLD AUTO: 4.36 MILLION/UL (ref 3.81–5.12)
RBC # BLD AUTO: 4.45 MILLION/UL (ref 3.81–5.12)
SODIUM SERPL-SCNC: 133 MMOL/L (ref 135–147)
SP GR UR STRIP.AUTO: 1.01
THC UR QL: NEGATIVE
TSH SERPL DL<=0.05 MIU/L-ACNC: 0.79 UIU/ML (ref 0.45–4.5)
UROBILINOGEN UR QL STRIP.AUTO: 0.2 E.U./DL
WBC # BLD AUTO: 11.09 THOUSAND/UL (ref 4.31–10.16)
WBC # BLD AUTO: 8.68 THOUSAND/UL (ref 4.31–10.16)

## 2025-01-24 PROCEDURE — 82075 ASSAY OF BREATH ETHANOL: CPT

## 2025-01-24 PROCEDURE — 80053 COMPREHEN METABOLIC PANEL: CPT | Performed by: EMERGENCY MEDICINE

## 2025-01-24 PROCEDURE — 93005 ELECTROCARDIOGRAM TRACING: CPT

## 2025-01-24 PROCEDURE — 85025 COMPLETE CBC W/AUTO DIFF WBC: CPT | Performed by: EMERGENCY MEDICINE

## 2025-01-24 PROCEDURE — 80307 DRUG TEST PRSMV CHEM ANLYZR: CPT

## 2025-01-24 PROCEDURE — 99285 EMERGENCY DEPT VISIT HI MDM: CPT | Performed by: EMERGENCY MEDICINE

## 2025-01-24 PROCEDURE — 36415 COLL VENOUS BLD VENIPUNCTURE: CPT | Performed by: EMERGENCY MEDICINE

## 2025-01-24 PROCEDURE — 84443 ASSAY THYROID STIM HORMONE: CPT | Performed by: EMERGENCY MEDICINE

## 2025-01-24 PROCEDURE — 83036 HEMOGLOBIN GLYCOSYLATED A1C: CPT

## 2025-01-24 PROCEDURE — 80159 DRUG ASSAY CLOZAPINE: CPT

## 2025-01-24 PROCEDURE — 99285 EMERGENCY DEPT VISIT HI MDM: CPT

## 2025-01-24 PROCEDURE — 81003 URINALYSIS AUTO W/O SCOPE: CPT | Performed by: EMERGENCY MEDICINE

## 2025-01-24 PROCEDURE — 85025 COMPLETE CBC W/AUTO DIFF WBC: CPT

## 2025-01-24 RX ORDER — BENZTROPINE MESYLATE 0.5 MG/1
0.5 TABLET ORAL
Status: CANCELLED | OUTPATIENT
Start: 2025-01-24

## 2025-01-24 RX ORDER — FOLIC ACID 1 MG/1
1 TABLET ORAL DAILY
Status: DISCONTINUED | OUTPATIENT
Start: 2025-01-24 | End: 2025-01-24 | Stop reason: HOSPADM

## 2025-01-24 RX ORDER — BENZTROPINE MESYLATE 1 MG/ML
1 INJECTION, SOLUTION INTRAMUSCULAR; INTRAVENOUS
Status: DISCONTINUED | OUTPATIENT
Start: 2025-01-24 | End: 2025-01-31 | Stop reason: HOSPADM

## 2025-01-24 RX ORDER — TRAZODONE HYDROCHLORIDE 50 MG/1
50 TABLET, FILM COATED ORAL
Status: CANCELLED | OUTPATIENT
Start: 2025-01-24

## 2025-01-24 RX ORDER — OLANZAPINE 5 MG/1
5 TABLET ORAL
Status: DISCONTINUED | OUTPATIENT
Start: 2025-01-24 | End: 2025-01-31 | Stop reason: HOSPADM

## 2025-01-24 RX ORDER — FOLIC ACID 1 MG/1
1 TABLET ORAL DAILY
Status: DISCONTINUED | OUTPATIENT
Start: 2025-01-25 | End: 2025-01-31 | Stop reason: HOSPADM

## 2025-01-24 RX ORDER — LORAZEPAM 1 MG/1
1 TABLET ORAL
Status: CANCELLED | OUTPATIENT
Start: 2025-01-24

## 2025-01-24 RX ORDER — ALBUTEROL SULFATE 90 UG/1
2 INHALANT RESPIRATORY (INHALATION) EVERY 4 HOURS PRN
Status: DISCONTINUED | OUTPATIENT
Start: 2025-01-24 | End: 2025-01-24 | Stop reason: HOSPADM

## 2025-01-24 RX ORDER — OLANZAPINE 2.5 MG/1
5 TABLET, FILM COATED ORAL
Status: CANCELLED | OUTPATIENT
Start: 2025-01-24

## 2025-01-24 RX ORDER — FLUTICASONE PROPIONATE 50 MCG
1 SPRAY, SUSPENSION (ML) NASAL DAILY
Status: DISCONTINUED | OUTPATIENT
Start: 2025-01-25 | End: 2025-01-31 | Stop reason: HOSPADM

## 2025-01-24 RX ORDER — OXYBUTYNIN CHLORIDE 5 MG/1
5 TABLET ORAL 2 TIMES DAILY
Status: DISCONTINUED | OUTPATIENT
Start: 2025-01-24 | End: 2025-01-31 | Stop reason: HOSPADM

## 2025-01-24 RX ORDER — LORAZEPAM 1 MG/1
1 TABLET ORAL
Status: DISCONTINUED | OUTPATIENT
Start: 2025-01-24 | End: 2025-01-31 | Stop reason: HOSPADM

## 2025-01-24 RX ORDER — PANTOPRAZOLE SODIUM 40 MG/1
40 TABLET, DELAYED RELEASE ORAL
Status: DISCONTINUED | OUTPATIENT
Start: 2025-01-25 | End: 2025-01-24 | Stop reason: HOSPADM

## 2025-01-24 RX ORDER — PANTOPRAZOLE SODIUM 40 MG/1
40 TABLET, DELAYED RELEASE ORAL
Status: DISCONTINUED | OUTPATIENT
Start: 2025-01-25 | End: 2025-01-31 | Stop reason: HOSPADM

## 2025-01-24 RX ORDER — LAMOTRIGINE 100 MG/1
100 TABLET ORAL 2 TIMES DAILY
Status: DISCONTINUED | OUTPATIENT
Start: 2025-01-24 | End: 2025-01-31 | Stop reason: HOSPADM

## 2025-01-24 RX ORDER — OLANZAPINE 2.5 MG/1
2.5 TABLET, FILM COATED ORAL
Status: CANCELLED | OUTPATIENT
Start: 2025-01-24

## 2025-01-24 RX ORDER — LORAZEPAM 0.5 MG/1
0.5 TABLET ORAL
Status: DISCONTINUED | OUTPATIENT
Start: 2025-01-24 | End: 2025-01-31 | Stop reason: HOSPADM

## 2025-01-24 RX ORDER — LAMOTRIGINE 100 MG/1
100 TABLET ORAL 2 TIMES DAILY
Status: CANCELLED | OUTPATIENT
Start: 2025-01-24

## 2025-01-24 RX ORDER — FLUTICASONE FUROATE AND VILANTEROL 200; 25 UG/1; UG/1
1 POWDER RESPIRATORY (INHALATION)
Status: DISCONTINUED | OUTPATIENT
Start: 2025-01-25 | End: 2025-01-31 | Stop reason: HOSPADM

## 2025-01-24 RX ORDER — ATORVASTATIN CALCIUM 10 MG/1
10 TABLET, FILM COATED ORAL DAILY
Status: DISCONTINUED | OUTPATIENT
Start: 2025-01-24 | End: 2025-01-24 | Stop reason: HOSPADM

## 2025-01-24 RX ORDER — ACETAMINOPHEN 325 MG/1
650 TABLET ORAL EVERY 4 HOURS PRN
Status: CANCELLED | OUTPATIENT
Start: 2025-01-24

## 2025-01-24 RX ORDER — ACETAMINOPHEN 325 MG/1
650 TABLET ORAL EVERY 4 HOURS PRN
Status: DISCONTINUED | OUTPATIENT
Start: 2025-01-24 | End: 2025-01-31 | Stop reason: HOSPADM

## 2025-01-24 RX ORDER — OLANZAPINE 10 MG/2ML
5 INJECTION, POWDER, FOR SOLUTION INTRAMUSCULAR
Status: CANCELLED | OUTPATIENT
Start: 2025-01-24

## 2025-01-24 RX ORDER — VENLAFAXINE HYDROCHLORIDE 37.5 MG/1
37.5 CAPSULE, EXTENDED RELEASE ORAL DAILY
Status: DISCONTINUED | OUTPATIENT
Start: 2025-01-24 | End: 2025-01-24 | Stop reason: HOSPADM

## 2025-01-24 RX ORDER — RISPERIDONE 2 MG/1
2 TABLET ORAL 2 TIMES DAILY
Status: DISCONTINUED | OUTPATIENT
Start: 2025-01-24 | End: 2025-01-31 | Stop reason: HOSPADM

## 2025-01-24 RX ORDER — LANOLIN ALCOHOL/MO/W.PET/CERES
100 CREAM (GRAM) TOPICAL DAILY
Status: CANCELLED | OUTPATIENT
Start: 2025-01-25

## 2025-01-24 RX ORDER — FLUTICASONE PROPIONATE AND SALMETEROL XINAFOATE 115; 21 UG/1; UG/1
2 AEROSOL, METERED RESPIRATORY (INHALATION) 2 TIMES DAILY
Status: DISCONTINUED | OUTPATIENT
Start: 2025-01-24 | End: 2025-01-24 | Stop reason: RX

## 2025-01-24 RX ORDER — FOLIC ACID 1 MG/1
1 TABLET ORAL DAILY
Status: CANCELLED | OUTPATIENT
Start: 2025-01-25

## 2025-01-24 RX ORDER — CLOZAPINE 100 MG/1
400 TABLET ORAL
Status: DISCONTINUED | OUTPATIENT
Start: 2025-01-24 | End: 2025-01-27

## 2025-01-24 RX ORDER — BENZTROPINE MESYLATE 1 MG/ML
1 INJECTION, SOLUTION INTRAMUSCULAR; INTRAVENOUS
Status: CANCELLED | OUTPATIENT
Start: 2025-01-24

## 2025-01-24 RX ORDER — LORAZEPAM 0.5 MG/1
0.5 TABLET ORAL
Status: CANCELLED | OUTPATIENT
Start: 2025-01-24

## 2025-01-24 RX ORDER — HYDROXYZINE HYDROCHLORIDE 50 MG/1
25 TABLET, FILM COATED ORAL
Status: CANCELLED | OUTPATIENT
Start: 2025-01-24

## 2025-01-24 RX ORDER — FLUTICASONE FUROATE AND VILANTEROL 200; 25 UG/1; UG/1
1 POWDER RESPIRATORY (INHALATION)
Status: DISCONTINUED | OUTPATIENT
Start: 2025-01-24 | End: 2025-01-24 | Stop reason: HOSPADM

## 2025-01-24 RX ORDER — LORAZEPAM 2 MG/ML
1 INJECTION INTRAMUSCULAR
Status: CANCELLED | OUTPATIENT
Start: 2025-01-24

## 2025-01-24 RX ORDER — ACETAMINOPHEN 325 MG/1
975 TABLET ORAL EVERY 6 HOURS PRN
Status: DISCONTINUED | OUTPATIENT
Start: 2025-01-24 | End: 2025-01-31 | Stop reason: HOSPADM

## 2025-01-24 RX ORDER — ALBUTEROL SULFATE 90 UG/1
2 INHALANT RESPIRATORY (INHALATION) EVERY 4 HOURS PRN
Status: CANCELLED | OUTPATIENT
Start: 2025-01-24

## 2025-01-24 RX ORDER — LORAZEPAM 1 MG/1
1 TABLET ORAL ONCE
Status: COMPLETED | OUTPATIENT
Start: 2025-01-24 | End: 2025-01-24

## 2025-01-24 RX ORDER — OXYBUTYNIN CHLORIDE 5 MG/1
5 TABLET ORAL 2 TIMES DAILY
Status: DISCONTINUED | OUTPATIENT
Start: 2025-01-24 | End: 2025-01-24 | Stop reason: HOSPADM

## 2025-01-24 RX ORDER — LANOLIN ALCOHOL/MO/W.PET/CERES
100 CREAM (GRAM) TOPICAL DAILY
Status: DISCONTINUED | OUTPATIENT
Start: 2025-01-25 | End: 2025-01-31 | Stop reason: HOSPADM

## 2025-01-24 RX ORDER — ACETAMINOPHEN 325 MG/1
975 TABLET ORAL EVERY 6 HOURS PRN
Status: CANCELLED | OUTPATIENT
Start: 2025-01-24

## 2025-01-24 RX ORDER — LAMOTRIGINE 100 MG/1
100 TABLET ORAL 2 TIMES DAILY
Status: DISCONTINUED | OUTPATIENT
Start: 2025-01-24 | End: 2025-01-24 | Stop reason: HOSPADM

## 2025-01-24 RX ORDER — RISPERIDONE 0.25 MG/1
0.5 TABLET ORAL 3 TIMES DAILY
Status: CANCELLED | OUTPATIENT
Start: 2025-01-24

## 2025-01-24 RX ORDER — LANOLIN ALCOHOL/MO/W.PET/CERES
100 CREAM (GRAM) TOPICAL DAILY
Status: DISCONTINUED | OUTPATIENT
Start: 2025-01-24 | End: 2025-01-24 | Stop reason: HOSPADM

## 2025-01-24 RX ORDER — PANTOPRAZOLE SODIUM 40 MG/1
40 TABLET, DELAYED RELEASE ORAL
Status: CANCELLED | OUTPATIENT
Start: 2025-01-25

## 2025-01-24 RX ORDER — RISPERIDONE 1 MG/1
2 TABLET ORAL 3 TIMES DAILY
Status: DISCONTINUED | OUTPATIENT
Start: 2025-01-24 | End: 2025-01-24 | Stop reason: HOSPADM

## 2025-01-24 RX ORDER — VENLAFAXINE HYDROCHLORIDE 37.5 MG/1
37.5 CAPSULE, EXTENDED RELEASE ORAL DAILY
Status: DISCONTINUED | OUTPATIENT
Start: 2025-01-25 | End: 2025-01-25

## 2025-01-24 RX ORDER — FLUTICASONE PROPIONATE 50 MCG
1 SPRAY, SUSPENSION (ML) NASAL DAILY
Status: DISCONTINUED | OUTPATIENT
Start: 2025-01-24 | End: 2025-01-24 | Stop reason: HOSPADM

## 2025-01-24 RX ORDER — BACLOFEN 10 MG/1
10 TABLET ORAL 3 TIMES DAILY PRN
COMMUNITY
Start: 2024-11-26 | End: 2025-01-31

## 2025-01-24 RX ORDER — OXYBUTYNIN CHLORIDE 5 MG/1
5 TABLET ORAL 2 TIMES DAILY
Status: CANCELLED | OUTPATIENT
Start: 2025-01-24

## 2025-01-24 RX ORDER — AMOXICILLIN 250 MG
1 CAPSULE ORAL
Status: DISCONTINUED | OUTPATIENT
Start: 2025-01-24 | End: 2025-01-31 | Stop reason: HOSPADM

## 2025-01-24 RX ORDER — ATORVASTATIN CALCIUM 10 MG/1
10 TABLET, FILM COATED ORAL DAILY
Status: DISCONTINUED | OUTPATIENT
Start: 2025-01-25 | End: 2025-01-31 | Stop reason: HOSPADM

## 2025-01-24 RX ORDER — METAXALONE 800 MG/1
800 TABLET ORAL
COMMUNITY
Start: 2024-11-01 | End: 2025-01-31

## 2025-01-24 RX ORDER — RISPERIDONE 0.25 MG/1
0.5 TABLET ORAL 3 TIMES DAILY
Status: DISCONTINUED | OUTPATIENT
Start: 2025-01-24 | End: 2025-01-24

## 2025-01-24 RX ORDER — RISPERIDONE 2 MG/1
2 TABLET ORAL 2 TIMES DAILY
COMMUNITY
Start: 2024-12-16

## 2025-01-24 RX ORDER — ALBUTEROL SULFATE 90 UG/1
2 INHALANT RESPIRATORY (INHALATION) EVERY 4 HOURS PRN
Status: DISCONTINUED | OUTPATIENT
Start: 2025-01-24 | End: 2025-01-31 | Stop reason: HOSPADM

## 2025-01-24 RX ORDER — LORAZEPAM 2 MG/ML
1 INJECTION INTRAMUSCULAR
Status: DISCONTINUED | OUTPATIENT
Start: 2025-01-24 | End: 2025-01-31 | Stop reason: HOSPADM

## 2025-01-24 RX ORDER — ATORVASTATIN CALCIUM 10 MG/1
10 TABLET, FILM COATED ORAL DAILY
Status: CANCELLED | OUTPATIENT
Start: 2025-01-25

## 2025-01-24 RX ORDER — VENLAFAXINE HYDROCHLORIDE 37.5 MG/1
37.5 CAPSULE, EXTENDED RELEASE ORAL DAILY
Status: CANCELLED | OUTPATIENT
Start: 2025-01-25

## 2025-01-24 RX ORDER — FLUTICASONE PROPIONATE AND SALMETEROL XINAFOATE 115; 21 UG/1; UG/1
2 AEROSOL, METERED RESPIRATORY (INHALATION) 2 TIMES DAILY
Status: CANCELLED | OUTPATIENT
Start: 2025-01-24

## 2025-01-24 RX ORDER — OLANZAPINE 2.5 MG/1
2.5 TABLET, FILM COATED ORAL
Status: DISCONTINUED | OUTPATIENT
Start: 2025-01-24 | End: 2025-01-31 | Stop reason: HOSPADM

## 2025-01-24 RX ORDER — OLANZAPINE 10 MG/2ML
5 INJECTION, POWDER, FOR SOLUTION INTRAMUSCULAR
Status: DISCONTINUED | OUTPATIENT
Start: 2025-01-24 | End: 2025-01-31 | Stop reason: HOSPADM

## 2025-01-24 RX ORDER — FLUTICASONE PROPIONATE 50 MCG
1 SPRAY, SUSPENSION (ML) NASAL DAILY
Status: CANCELLED | OUTPATIENT
Start: 2025-01-25

## 2025-01-24 RX ORDER — HYDROXYZINE HYDROCHLORIDE 25 MG/1
25 TABLET, FILM COATED ORAL
Status: DISCONTINUED | OUTPATIENT
Start: 2025-01-24 | End: 2025-01-31 | Stop reason: HOSPADM

## 2025-01-24 RX ORDER — BENZTROPINE MESYLATE 0.5 MG/1
0.5 TABLET ORAL
Status: DISCONTINUED | OUTPATIENT
Start: 2025-01-24 | End: 2025-01-31 | Stop reason: HOSPADM

## 2025-01-24 RX ORDER — TRAZODONE HYDROCHLORIDE 50 MG/1
50 TABLET, FILM COATED ORAL
Status: DISCONTINUED | OUTPATIENT
Start: 2025-01-24 | End: 2025-01-31 | Stop reason: HOSPADM

## 2025-01-24 RX ADMIN — SENNOSIDES AND DOCUSATE SODIUM 1 TABLET: 50; 8.6 TABLET ORAL at 21:25

## 2025-01-24 RX ADMIN — CYANOCOBALAMIN TAB 500 MCG 500 MCG: 500 TAB at 14:30

## 2025-01-24 RX ADMIN — FLUTICASONE PROPIONATE 1 SPRAY: 50 SPRAY, METERED NASAL at 14:31

## 2025-01-24 RX ADMIN — RISPERIDONE 2 MG: 2 TABLET, FILM COATED ORAL at 18:26

## 2025-01-24 RX ADMIN — LAMOTRIGINE 100 MG: 100 TABLET ORAL at 17:21

## 2025-01-24 RX ADMIN — OXYBUTYNIN CHLORIDE 5 MG: 5 TABLET ORAL at 17:21

## 2025-01-24 RX ADMIN — FLUTICASONE FUROATE AND VILANTEROL TRIFENATATE 1 PUFF: 200; 25 POWDER RESPIRATORY (INHALATION) at 14:46

## 2025-01-24 RX ADMIN — Medication 1 TABLET: at 14:30

## 2025-01-24 RX ADMIN — LORAZEPAM 1 MG: 1 TABLET ORAL at 17:23

## 2025-01-24 RX ADMIN — ATORVASTATIN CALCIUM 10 MG: 10 TABLET, FILM COATED ORAL at 14:30

## 2025-01-24 RX ADMIN — CLOZAPINE 400 MG: 100 TABLET ORAL at 21:25

## 2025-01-24 RX ADMIN — LORAZEPAM 1 MG: 1 TABLET ORAL at 14:31

## 2025-01-24 RX ADMIN — THIAMINE HCL TAB 100 MG 100 MG: 100 TAB at 14:30

## 2025-01-24 RX ADMIN — FOLIC ACID 1 MG: 1 TABLET ORAL at 14:30

## 2025-01-24 NOTE — LETTER
[facility] EMERGENCY DEPARTMENT  [address] [place] [address]  Dept: [phone]      EMTALA TRANSFER CONSENT    NAME Jo-Ann SHARIF 1970                              MRN [id number]    I have been informed of my rights regarding examination, treatment, and transfer   by Dr. Raulito Martin I*    Benefits: Continuity of care    Risks: Potential for delay in receiving treatment      Consent for Transfer:  I acknowledge that my medical condition has been evaluated and explained to me by the emergency department physician or other qualified medical person and/or my attending physician, who has recommended that I be transferred to the service of  Accepting Physician:  at Accepting Facility Name, City & State : [facility], [place]. The above potential benefits of such transfer, the potential risks associated with such transfer, and the probable risks of not being transferred have been explained to me, and I fully understand them.  The doctor has explained that, in my case, the benefits of transfer outweigh the risks.  I agree to be transferred.    I authorize the performance of emergency medical procedures and treatments upon me in both transit and upon arrival at the receiving facility.  Additionally, I authorize the release of any and all medical records to the receiving facility and request they be transported with me, if possible.  I understand that the safest mode of transportation during a medical emergency is an ambulance and that the Hospital advocates the use of this mode of transport. Risks of traveling to the receiving facility by car, including absence of medical control, life sustaining equipment, such as oxygen, and medical personnel has been explained to me and I fully understand them.    (OSEI CORRECT BOX BELOW)  [ X ]  I consent to the stated transfer and to be transported by ambulance/helicopter.  [  ]  I consent  to the stated transfer, but refuse transportation by ambulance and accept full responsibility for my transportation by car.  I understand the risks of non-ambulance transfers and I exonerate the Hospital and its staff from any deterioration in my condition that results from this refusal.    X___________________________________________    DATE  25  TIME________  Signature of patient or legally responsible individual signing on patient behalf           RELATIONSHIP TO PATIENT___SELF______________________                      Provider Certification    NAME Jo-Ann Lamb                                         1970                              MRN [id number]    A medical screening exam was performed on the above named patient.  Based on the examination:    Condition Necessitating Transfer The primary encounter diagnosis was Depression with suicidal ideation. Diagnoses of Hemorrhoids, unspecified hemorrhoid type, Mixed hyperlipidemia, and Pulmonary emphysema, unspecified emphysema type (HCC) were also pertinent to this visit.    Patient Condition: The patient has been stabilized such that within reasonable medical probability, no material deterioration of the patient condition or the condition of the unborn child(yeni) is likely to result from the transfer    Reason for Transfer: Level of Care needed not available at this facility    Transfer Requirements: Facility [place], [place]   Space available and qualified personnel available for treatment as acknowledged by Ayah Quevedo [phone]  Agreed to accept transfer and to provide appropriate medical treatment as acknowledged by         Appropriate medical records of the examination and treatment of the patient are provided at the time of transfer   STAFF INITIAL WHEN COMPLETED __IK_____  Transfer will be performed by qualified personnel from [place]  and appropriate transfer equipment as required, including the use of necessary and appropriate  life support measures.    Provider Certification: I have examined the patient and explained the following risks and benefits of being transferred/refusing transfer to the patient/family:  The patient is stable for psychiatric transfer because they are medically stable, and is protected from harming him/herself or others during transport      Based on these reasonable risks and benefits to the patient and/or the unborn child(yeni), and based upon the information available at the time of the patient’s examination, I certify that the medical benefits reasonably to be expected from the provision of appropriate medical treatments at another medical facility outweigh the increasing risks, if any, to the individual’s medical condition, and in the case of labor to the unborn child, from effecting the transfer.    X____________________________________________ DATE 01/24/25        TIME_______      ORIGINAL - SEND TO MEDICAL RECORDS   COPY - SEND WITH PATIENT DURING TRANSFER

## 2025-01-24 NOTE — ED NOTES
The [facility] you requested for Jo-Ann HANSEN in unit/room SH 01 on 01/24/2025 is scheduled to arrive at 3:00pm EST! [facility].   using a smart phone  Do not eat food out of a multi-serving bag or container.  Establish 6 hours of food-free \"time-out\" periods (times you don't eat) each day. No period can be less than 1 hour long. The periods need to be the same every day for days that are the same (for example, workdays would have one set of food free periods and weekends would have another set of days). These six hours are in addition to the two hours before bedtime and the time spent sleeping.    Cont to see the dietician as needed for assistance with food choices and meal options      Follow up  Return to see me in in 2-4 months    Total time spent on the encounter: 40 min    Torrey Thomas MD  Endocrinology/Obesity  7/31/24

## 2025-01-24 NOTE — PROGRESS NOTES
Pt states there was another bag of belongings left at the [facility] was checking up on that

## 2025-01-24 NOTE — ED NOTES
Crisis prepared 201 and obtained signatures. Pt informed about 201 rights, 72 hours as well as inpatient psychiatric hospitalization process.    PT REQUESTED PLACEMENT AT [facility] ONLY AT THIS TIME.

## 2025-01-24 NOTE — NURSING NOTE
"Patient admitted to unit from [place] [facility]. Patient states recently having trouble started feeling increasingly depressed and states started having passive SI thoughts. States AH are \"not bad\" but VH are getting \"worse\" states she trys to ignore them. States at home was feeling paranoid that someone was going to hurt her and does not know why she was feeling that way. Patient states night before she went to the ED she was having thoughts of wanting to die so she drank \"a lot\" of whiskey to try and drink herself to death. Patient states she does not have a drinking problem and that was the first she has drank that much. Patient states she has been med compliant. Completed skin check with Nilda JIMENES no issues noted. Patient states anxiety has been severe along with depression. Patient cooperated with admission process. Patient affect was flat and depressed. Patient declined shower states wants to wait for her clothes. Patient orientated to unit. Q 15 min behavioral and safety checks in place.  "

## 2025-01-24 NOTE — ED NOTES
Crisis prepared hospital packet, copies obtained for the record, HUANG, Medical Necessity.  Original 201 in the packet.

## 2025-01-24 NOTE — NURSING NOTE
Patient received her evening medications and was laying in her bed very anxious and restless. Patient stated that she was overwhelmed with the admission and new hospital. Patient scored 25 on jerry anxiety scale and received ativan 1 mg. Will re-assess for effectiveness. Continuous safety monitoring in place.       Medication was effective, patient is currently in bed resting.

## 2025-01-24 NOTE — ED NOTES
Patient is accepted at [facility].  Patient is accepted by Dr. Giles per Edna/Intake.     Transportation is arranged with [facility].     Transportation is scheduled for pending.   Patient may go to the floor at pending.          Nurse report is to be called to [phone] prior to patient transfer.
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
## 2025-01-24 NOTE — ED NOTES
Insurance Authorization for admission:   Phone call placed to [facility].  Phone number: [phone].     Spoke to Yancy.     5 days approved.  Level of care: 201.  Review on 1/28/2025 .   Authorization # [id number].

## 2025-01-25 LAB
25(OH)D3 SERPL-MCNC: 36.7 NG/ML (ref 30–100)
ATRIAL RATE: 99 BPM
BNP SERPL-MCNC: 59 PG/ML (ref 0–100)
CARDIAC TROPONIN I PNL SERPL HS: 4 NG/L (ref 8–18)
CHOLEST SERPL-MCNC: 235 MG/DL (ref ?–200)
CK SERPL-CCNC: 36 U/L (ref 26–192)
CLOZAPINE SERPL-MCNC: 1127 NG/ML (ref 350–900)
CRP SERPL QL: 18.1 MG/L
EST. AVERAGE GLUCOSE BLD GHB EST-MCNC: 114 MG/DL
FOLATE SERPL-MCNC: >22.3 NG/ML
HBA1C MFR BLD: 5.6 %
HDLC SERPL-MCNC: 68 MG/DL
NONHDLC SERPL-MCNC: 167 MG/DL
P AXIS: 61 DEGREES
PR INTERVAL: 152 MS
QRS AXIS: -1 DEGREES
QRSD INTERVAL: 76 MS
QT INTERVAL: 366 MS
QTC INTERVAL: 470 MS
T WAVE AXIS: 52 DEGREES
TRIGL SERPL-MCNC: 433 MG/DL (ref ?–150)
VENTRICULAR RATE: 99 BPM
VIT B12 SERPL-MCNC: 535 PG/ML (ref 180–914)

## 2025-01-25 PROCEDURE — 93010 ELECTROCARDIOGRAM REPORT: CPT | Performed by: INTERNAL MEDICINE

## 2025-01-25 PROCEDURE — 82306 VITAMIN D 25 HYDROXY: CPT

## 2025-01-25 PROCEDURE — 82550 ASSAY OF CK (CPK): CPT | Performed by: PSYCHIATRY & NEUROLOGY

## 2025-01-25 PROCEDURE — 86780 TREPONEMA PALLIDUM: CPT

## 2025-01-25 PROCEDURE — 82746 ASSAY OF FOLIC ACID SERUM: CPT

## 2025-01-25 PROCEDURE — 82607 VITAMIN B-12: CPT

## 2025-01-25 PROCEDURE — 80061 LIPID PANEL: CPT

## 2025-01-25 PROCEDURE — 99223 1ST HOSP IP/OBS HIGH 75: CPT | Performed by: PSYCHIATRY & NEUROLOGY

## 2025-01-25 PROCEDURE — 84484 ASSAY OF TROPONIN QUANT: CPT

## 2025-01-25 PROCEDURE — 83880 ASSAY OF NATRIURETIC PEPTIDE: CPT

## 2025-01-25 PROCEDURE — 86140 C-REACTIVE PROTEIN: CPT

## 2025-01-25 RX ORDER — BENZONATATE 100 MG/1
100 CAPSULE ORAL 3 TIMES DAILY PRN
Status: DISCONTINUED | OUTPATIENT
Start: 2025-01-25 | End: 2025-01-31 | Stop reason: HOSPADM

## 2025-01-25 RX ORDER — VENLAFAXINE HYDROCHLORIDE 75 MG/1
75 CAPSULE, EXTENDED RELEASE ORAL DAILY
Status: DISCONTINUED | OUTPATIENT
Start: 2025-01-26 | End: 2025-01-28

## 2025-01-25 RX ORDER — NYSTATIN 100000 [USP'U]/ML
500000 SUSPENSION ORAL 4 TIMES DAILY
Status: DISCONTINUED | OUTPATIENT
Start: 2025-01-25 | End: 2025-01-31 | Stop reason: HOSPADM

## 2025-01-25 RX ORDER — CHLORAL HYDRATE 500 MG
1000 CAPSULE ORAL DAILY
Status: DISCONTINUED | OUTPATIENT
Start: 2025-01-25 | End: 2025-01-31 | Stop reason: HOSPADM

## 2025-01-25 RX ORDER — METHOCARBAMOL 500 MG/1
500 TABLET, FILM COATED ORAL EVERY 6 HOURS PRN
Status: DISCONTINUED | OUTPATIENT
Start: 2025-01-25 | End: 2025-01-31 | Stop reason: HOSPADM

## 2025-01-25 RX ORDER — POLYETHYLENE GLYCOL 3350 17 G/17G
17 POWDER, FOR SOLUTION ORAL DAILY PRN
Status: DISCONTINUED | OUTPATIENT
Start: 2025-01-25 | End: 2025-01-28

## 2025-01-25 RX ORDER — DOCUSATE SODIUM 100 MG/1
100 CAPSULE, LIQUID FILLED ORAL 2 TIMES DAILY
Status: DISCONTINUED | OUTPATIENT
Start: 2025-01-25 | End: 2025-01-31 | Stop reason: HOSPADM

## 2025-01-25 RX ORDER — NICOTINE 21 MG/24HR
14 PATCH, TRANSDERMAL 24 HOURS TRANSDERMAL DAILY
Status: DISCONTINUED | OUTPATIENT
Start: 2025-01-25 | End: 2025-01-31 | Stop reason: HOSPADM

## 2025-01-25 RX ORDER — HYDROCORTISONE 25 MG/G
CREAM TOPICAL 4 TIMES DAILY PRN
Status: DISCONTINUED | OUTPATIENT
Start: 2025-01-25 | End: 2025-01-31 | Stop reason: HOSPADM

## 2025-01-25 RX ADMIN — NYSTATIN 500000 UNITS: 100000 SUSPENSION ORAL at 21:46

## 2025-01-25 RX ADMIN — SENNOSIDES AND DOCUSATE SODIUM 1 TABLET: 50; 8.6 TABLET ORAL at 21:46

## 2025-01-25 RX ADMIN — CYANOCOBALAMIN TAB 500 MCG 500 MCG: 500 TAB at 09:00

## 2025-01-25 RX ADMIN — ATORVASTATIN CALCIUM 10 MG: 10 TABLET, FILM COATED ORAL at 09:01

## 2025-01-25 RX ADMIN — LAMOTRIGINE 100 MG: 100 TABLET ORAL at 17:14

## 2025-01-25 RX ADMIN — RISPERIDONE 2 MG: 2 TABLET, FILM COATED ORAL at 17:14

## 2025-01-25 RX ADMIN — PANTOPRAZOLE SODIUM 40 MG: 40 TABLET, DELAYED RELEASE ORAL at 09:01

## 2025-01-25 RX ADMIN — OXYBUTYNIN CHLORIDE 5 MG: 5 TABLET ORAL at 09:01

## 2025-01-25 RX ADMIN — DOCUSATE SODIUM 100 MG: 100 CAPSULE, LIQUID FILLED ORAL at 11:49

## 2025-01-25 RX ADMIN — RISPERIDONE 2 MG: 2 TABLET, FILM COATED ORAL at 09:01

## 2025-01-25 RX ADMIN — LAMOTRIGINE 100 MG: 100 TABLET ORAL at 09:01

## 2025-01-25 RX ADMIN — CLOZAPINE 400 MG: 100 TABLET ORAL at 21:46

## 2025-01-25 RX ADMIN — LORAZEPAM 0.5 MG: 0.5 TABLET ORAL at 16:00

## 2025-01-25 RX ADMIN — OMEGA-3 FATTY ACIDS CAP 1000 MG 1000 MG: 1000 CAP at 14:52

## 2025-01-25 RX ADMIN — THIAMINE HCL TAB 100 MG 100 MG: 100 TAB at 09:00

## 2025-01-25 RX ADMIN — Medication 1 TABLET: at 09:00

## 2025-01-25 RX ADMIN — FLUTICASONE FUROATE AND VILANTEROL TRIFENATATE 1 PUFF: 200; 25 POWDER RESPIRATORY (INHALATION) at 09:01

## 2025-01-25 RX ADMIN — POLYETHYLENE GLYCOL 3350 17 G: 17 POWDER, FOR SOLUTION ORAL at 17:19

## 2025-01-25 RX ADMIN — NYSTATIN 500000 UNITS: 100000 SUSPENSION ORAL at 17:56

## 2025-01-25 RX ADMIN — NYSTATIN 500000 UNITS: 100000 SUSPENSION ORAL at 14:53

## 2025-01-25 RX ADMIN — FOLIC ACID 1 MG: 1 TABLET ORAL at 09:00

## 2025-01-25 RX ADMIN — OXYBUTYNIN CHLORIDE 5 MG: 5 TABLET ORAL at 17:14

## 2025-01-25 RX ADMIN — FLUTICASONE PROPIONATE 1 SPRAY: 50 SPRAY, METERED NASAL at 09:02

## 2025-01-25 RX ADMIN — DOCUSATE SODIUM 100 MG: 100 CAPSULE, LIQUID FILLED ORAL at 21:46

## 2025-01-25 RX ADMIN — VENLAFAXINE HYDROCHLORIDE 37.5 MG: 37.5 CAPSULE, EXTENDED RELEASE ORAL at 09:00

## 2025-01-25 RX ADMIN — LORAZEPAM 1 MG: 1 TABLET ORAL at 09:05

## 2025-01-25 RX ADMIN — NICOTINE 14 MG: 14 PATCH, EXTENDED RELEASE TRANSDERMAL at 11:50
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
## 2025-01-25 NOTE — NURSING NOTE
Received call back from Cammie from the ACT Team. Cammie made aware that Physician is requesting  the patient's remaining Clozaril pill count. Cammie to reach out to patient's nurse. Nurse to call unit with requested information.

## 2025-01-25 NOTE — NURSING NOTE
Contacted On-Call ACT Team office, spoke with  number [phone], [phone].  number [phone] to have ACT Team member call unit back per MD request.

## 2025-01-25 NOTE — PLAN OF CARE
Problem: Alteration in Thoughts and Perception  Goal: Treatment Goal: Gain control of psychotic behaviors/thinking, reduce/eliminate presenting symptoms and demonstrate improved reality functioning upon discharge  Outcome: Progressing  Goal: Verbalize thoughts and feelings  Description: Interventions:  - Promote a nonjudgmental and trusting relationship with the patient through active listening and therapeutic communication  - Assess patient's level of functioning, behavior and potential for risk  - Engage patient in 1 on 1 interactions  - Encourage patient to express fears, feelings, frustrations, and discuss symptoms    - [place] patient to reality, help patient recognize reality-based thinking   - Administer medications as ordered and assess for potential side effects  - Provide the patient education related to the signs and symptoms of the illness and desired effects of prescribed medications  Outcome: Progressing  Goal: Refrain from acting on delusional thinking/internal stimuli  Description: Interventions:  - Monitor patient closely, per order   - Utilize least restrictive measures   - Set reasonable limits, give positive feedback for acceptable   - Administer medications as ordered and monitor of potential side effects  Outcome: Progressing  Goal: Agree to be compliant with medication regime, as prescribed and report medication side effects  Description: Interventions:  - Offer appropriate PRN medication and supervise ingestion; conduct AIMS, as needed   Outcome: Progressing  Goal: Attend and participate in unit activities, including therapeutic, recreational, and educational groups  Description: Interventions:  -Encourage Visitation and family involvement in care  Outcome: Progressing  Goal: Recognize dysfunctional thoughts, communicate reality-based thoughts at the time of discharge  Description: Interventions:  - Provide medication and psycho-education to assist patient in compliance and developing  insight into his/her illness   Outcome: Progressing  Goal: Complete daily ADLs, including personal hygiene independently, as able  Description: Interventions:  - Observe, teach, and assist patient with ADLS  - Monitor and promote a balance of rest/activity, with adequate nutrition and elimination   Outcome: Progressing     Problem: Risk for Self Injury/Neglect  Goal: Treatment Goal: Remain safe during length of stay, learn and adopt new coping skills, and be free of self-injurious ideation, impulses and acts at the time of discharge  Outcome: Progressing  Goal: Verbalize thoughts and feelings  Description: Interventions:  - Assess and re-assess patient's lethality and potential for self-injury  - Engage patient in 1:1 interactions, daily, for a minimum of 15 minutes  - Encourage patient to express feelings, fears, frustrations, hopes  - Establish rapport/trust with patient   Outcome: Progressing  Goal: Refrain from harming self  Description: Interventions:  - Monitor patient closely, per order  - Develop a trusting relationship  - Supervise medication ingestion, monitor effects and side effects   Outcome: Progressing  Goal: Attend and participate in unit activities, including therapeutic, recreational, and educational groups  Description: Interventions:  - Provide therapeutic and educational activities daily, encourage attendance and participation, and document same in the medical record  - Obtain collateral information, encourage visitation and family involvement in care   Outcome: Progressing  Goal: Recognize maladaptive responses and adopt new coping mechanisms  Outcome: Progressing     Problem: Depression  Goal: Treatment Goal: Demonstrate behavioral control of depressive symptoms, verbalize feelings of improved mood/affect, and adopt new coping skills prior to discharge  Outcome: Progressing  Goal: Verbalize thoughts and feelings  Description: Interventions:  - Assess and re-assess patient's level of risk   -  Engage patient in 1:1 interactions, daily, for a minimum of 15 minutes   - Encourage patient to express feelings, fears, frustrations, hopes   Outcome: Progressing  Goal: Refrain from isolation  Description: Interventions:  - Develop a trusting relationship   - Encourage socialization   Outcome: Progressing     Problem: Anxiety  Goal: Anxiety is at manageable level  Description: Interventions:  - Assess and monitor patient's anxiety level.   - Monitor for signs and symptoms (heart palpitations, chest pain, shortness of breath, headaches, nausea, feeling jumpy, restlessness, irritable, apprehensive).   - Collaborate with interdisciplinary team and initiate plan and interventions as ordered.  - [place] patient to unit/surroundings  - Explain treatment plan  - Encourage participation in care  - Encourage verbalization of concerns/fears  - Identify coping mechanisms  - Assist in developing anxiety-reducing skills  - Administer/offer alternative therapies  - Limit or eliminate stimulants  Outcome: Progressing

## 2025-01-25 NOTE — NURSING NOTE
No acute behaviors overnight.  No complaints voiced.  No respiratory distress or change in medical condition.  Observed sleeping during shift. CIWA 0 this shift.  Maintained on q 15 minute safety checks.  Will continue to monitor.

## 2025-01-25 NOTE — NURSING NOTE
The ACT Team, Cammie Moreau, spoke with Jo-Ann's nurse who dispenses her meds. She stated that Jo-Ann's nurse doesn't know how to answer the question of how many Clozaril pills she has left stating he packs her meds every Tuesday and she takes them on her own. If she takes them like she should, 4 doses should be gone. Jo-Ann is unable to state how many pills are left. Dr. Malavepin aware of the noted information.

## 2025-01-25 NOTE — NURSING NOTE
"Patient expressing moderate anxiety. C/O feeling restless, speech rapid, preoccupied with belongings. Patient made aware that her belongings will be couriered tomorrow morning. PRN Ativan 0.5 mg PO given for Martines score of 19. Remains on 15\" checks for safety and behaviors.  "

## 2025-01-25 NOTE — NURSING NOTE
Lab specimens obtained and sent to lab. Creatinine Kinase lab held, to be checked with provider if it is valid for OA unit stay.

## 2025-01-25 NOTE — NURSING NOTE
"Patient out for meals then returns to room to rest in bed. Social with staff, limited interaction with peers. Patient endorses anxiety/depression, denies SI/HI, hallucinations thus far. Affect depressed/flat. PRN Ativan 1 mg PO given for severe anxiety. Martines score of 25. Relief of anxiety achieved S/P PRN Ativan. Remains medication compliant and on 15\" checks for safety and behaviors.  "

## 2025-01-25 NOTE — NURSING NOTE
"Patient expressed decreased anxiety S/P PRN Ativan. Currently eating in room per request. No noted distress. Remains on 15\" check for safety and behaviors.  "
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
## 2025-01-25 NOTE — NURSING NOTE
Adjusting to unit. Patient withdrawn to room this evening, sleeping soundly. Woke for HS medications only, then returned promptly to sleep. No overt respiratory distress noted, however, a occasional cough was observed. Maintained on q 15 minute safety checks. Refused evening snack. Will continue to monitor.

## 2025-01-26 LAB — TREPONEMA PALLIDUM IGG+IGM AB [PRESENCE] IN SERUM OR PLASMA BY IMMUNOASSAY: NORMAL

## 2025-01-26 PROCEDURE — 99232 SBSQ HOSP IP/OBS MODERATE 35: CPT

## 2025-01-26 PROCEDURE — 80159 DRUG ASSAY CLOZAPINE: CPT | Performed by: PSYCHIATRY & NEUROLOGY

## 2025-01-26 RX ADMIN — OXYBUTYNIN CHLORIDE 5 MG: 5 TABLET ORAL at 09:50

## 2025-01-26 RX ADMIN — NYSTATIN 500000 UNITS: 100000 SUSPENSION ORAL at 12:03

## 2025-01-26 RX ADMIN — LAMOTRIGINE 100 MG: 100 TABLET ORAL at 09:50

## 2025-01-26 RX ADMIN — DOCUSATE SODIUM 100 MG: 100 CAPSULE, LIQUID FILLED ORAL at 09:48

## 2025-01-26 RX ADMIN — NYSTATIN 500000 UNITS: 100000 SUSPENSION ORAL at 09:51

## 2025-01-26 RX ADMIN — FOLIC ACID 1 MG: 1 TABLET ORAL at 09:50

## 2025-01-26 RX ADMIN — LAMOTRIGINE 100 MG: 100 TABLET ORAL at 17:05

## 2025-01-26 RX ADMIN — PANTOPRAZOLE SODIUM 40 MG: 40 TABLET, DELAYED RELEASE ORAL at 09:50

## 2025-01-26 RX ADMIN — RISPERIDONE 2 MG: 2 TABLET, FILM COATED ORAL at 09:48

## 2025-01-26 RX ADMIN — CLOZAPINE 400 MG: 100 TABLET ORAL at 21:47

## 2025-01-26 RX ADMIN — ATORVASTATIN CALCIUM 10 MG: 10 TABLET, FILM COATED ORAL at 09:48

## 2025-01-26 RX ADMIN — LORAZEPAM 1 MG: 1 TABLET ORAL at 16:02

## 2025-01-26 RX ADMIN — OLANZAPINE 5 MG: 5 TABLET, FILM COATED ORAL at 11:03

## 2025-01-26 RX ADMIN — NYSTATIN 500000 UNITS: 100000 SUSPENSION ORAL at 17:06

## 2025-01-26 RX ADMIN — VENLAFAXINE HYDROCHLORIDE 75 MG: 75 CAPSULE, EXTENDED RELEASE ORAL at 09:49

## 2025-01-26 RX ADMIN — Medication 1 TABLET: at 09:49

## 2025-01-26 RX ADMIN — CYANOCOBALAMIN TAB 500 MCG 500 MCG: 500 TAB at 09:49

## 2025-01-26 RX ADMIN — LORAZEPAM 0.5 MG: 0.5 TABLET ORAL at 09:55

## 2025-01-26 RX ADMIN — NYSTATIN 500000 UNITS: 100000 SUSPENSION ORAL at 21:47

## 2025-01-26 RX ADMIN — DOCUSATE SODIUM 100 MG: 100 CAPSULE, LIQUID FILLED ORAL at 17:04

## 2025-01-26 RX ADMIN — THIAMINE HCL TAB 100 MG 100 MG: 100 TAB at 09:49

## 2025-01-26 RX ADMIN — RISPERIDONE 2 MG: 2 TABLET, FILM COATED ORAL at 17:04

## 2025-01-26 RX ADMIN — FLUTICASONE FUROATE AND VILANTEROL TRIFENATATE 1 PUFF: 200; 25 POWDER RESPIRATORY (INHALATION) at 09:52

## 2025-01-26 RX ADMIN — OMEGA-3 FATTY ACIDS CAP 1000 MG 1000 MG: 1000 CAP at 09:50

## 2025-01-26 RX ADMIN — POLYETHYLENE GLYCOL 3350 17 G: 17 POWDER, FOR SOLUTION ORAL at 17:04

## 2025-01-26 RX ADMIN — OXYBUTYNIN CHLORIDE 5 MG: 5 TABLET ORAL at 17:05

## 2025-01-26 RX ADMIN — SENNOSIDES AND DOCUSATE SODIUM 1 TABLET: 50; 8.6 TABLET ORAL at 21:47

## 2025-01-26 RX ADMIN — FLUTICASONE PROPIONATE 1 SPRAY: 50 SPRAY, METERED NASAL at 09:52

## 2025-01-26 NOTE — ASSESSMENT & PLAN NOTE
-Continue Clozaril 400 mg at bedtime, Clozapine level 1,127 on 1/24. Denies side effects besides mild hypersalivation.   -Continue recently titrated Effexor XR 75 mg daily for anxiety and depressive symptoms  -Continue Risperdal 2 mg twice daily

## 2025-01-26 NOTE — NURSING NOTE
"Patient currently eating in dining room among peers, keeps to self. Pleasant and cooperative in interaction. No further verbal outbursts or slamming of doors. Patient obtained relief from anxiety S/P PRN Ativan. Remains on 15\" checks for safety and behaviors.  "

## 2025-01-26 NOTE — NURSING NOTE
"Patient with moderate relief from agitation S/P PRN Zyprexa. Currently resting in bed, no noted distress. Pleasant and cooperative in interaction. Remains on 15\" checks for safety and behaviors.  "

## 2025-01-26 NOTE — PROGRESS NOTES
Progress Note - Behavioral Health   Name: Jo-Ann Lamb 54 y.o. female I MRN: [id number]  Unit/Bed#: [facility] 209-02 I Date of Admission: 1/24/2025   Date of Service: 1/26/2025 I Hospital Day: 2     Assessment & Plan  Schizoaffective disorder, bipolar type (HCC)  -Continue Clozaril 400 mg at bedtime, Clozapine level 1,127 on 1/24. Denies side effects besides mild hypersalivation.   -Continue recently titrated Effexor XR 75 mg daily for anxiety and depressive symptoms  -Continue Risperdal 2 mg twice daily  -Continue Lamictal 100 mg twice daily    Progress Note - Behavioral Health   Jo-Ann Lamb 54 y.o. female MRN: [id number]  Unit/Bed#: [facility]-02 Encounter: [id number]    Assessment & Plan   Principal Problem:    Schizoaffective disorder, bipolar type (HCC)      Subjective:Patient was seen today for continuation of care, records reviewed and  patient was discussed with the morning case review team.  Patient recent admission to the unit on 1/25 for worsening depression, auditory hallucinations, hopelessness, paranoid ideation. Appears somewhat paranoid, guarded, distracted during conversation. Periods of irritability, anxiety on unit. Tolerating the recent increase of Effexor with no reported side effects.  Denies feeling constipated, denies motor symptoms related to her medications including Clozaril 400 mg at bedtime.  Last BM reported 2-3 days ago. Denies current hallucinations but admits to recent auditory hallucinations of hearing voices telling her to leave her house preceding admission.  Mood is appropriate at time of encounter with appropriate behaviors in the milieu.  Recent moderate to high anxiety relieved by as needed lorazepam last evening.Patient denies endorsing any suicidal or homicidal ideation. Remains medication compliance. Denies any side effects from medications.    Psychiatric Review of Systems:    Sleep: normal  Appetite: fair  Medication side effects: No   ROS: no complaints    Vitals:  Vitals:     01/26/25 0810   BP: 124/81   Pulse: 90   Resp: 17   Temp: 98.1 °F (36.7 °C)   SpO2: 90%       Mental Status Evaluation:    Appearance:  dressed in hospital attire   Behavior:  cooperative, calm   Speech:  delayed, soft   Mood:  depressed   Affect:  constricted   Thought Process:  decreased rate of thoughts   Associations: concrete associations   Thought Content:  paranoid ideation   Perceptual Disturbances: denies auditory hallucinations when asked   Risk Potential: Suicidal ideation - None at present  Homicidal ideation - None  Potential for aggression - No   Sensorium:  oriented to person, place, and time/date   Memory:  recent and remote memory grossly intact   Consciousness:  alert and awake   Attention: attention span and concentration appear shorter than expected for age   Insight:  limited   Judgment: limited   Gait/Station: in bed   Motor Activity: no abnormal movements     Laboratory results:  I have personally reviewed all pertinent laboratory/tests results.  Most Recent Labs:   Lab Results   Component Value Date    WBC 8.68 01/24/2025    RBC 4.36 01/24/2025    HGB 13.1 01/24/2025    HCT 40.7 01/24/2025     01/24/2025    RDW 13.9 01/24/2025    NEUTROABS 5.20 01/24/2025    SODIUM 133 (L) 01/24/2025    K 3.7 01/24/2025    CL 96 01/24/2025    CO2 26 01/24/2025    BUN 8 01/24/2025    CREATININE 0.82 01/24/2025    GLUC 137 01/24/2025    GLUF 88 03/23/2024    CALCIUM 8.7 01/24/2025    AST 18 01/24/2025    ALT 17 01/24/2025    ALKPHOS 97 01/24/2025    TP 6.8 01/24/2025    ALB 4.4 01/24/2025    TBILI 0.21 01/24/2025    CHOLESTEROL 235 (H) 01/25/2025    HDL 68 01/25/2025    TRIG 433 (H) 01/25/2025    LDLCALC  01/25/2025      Comment:      Calculated LDL invalid, triglycerides >400 mg/dl  This screening LDL is a calculated result.   It does not have the accuracy of the Direct Measured LDL in the monitoring of patients with hyperlipidemia and/or statin therapy.   Direct Measure LDL (DMC712) must be ordered  separately in these patients.    NONHDLC 167 01/25/2025    AMMONIA 28 10/27/2017    GIS2HUTDMKUT 0.790 01/24/2025    FREET4 0.89 03/24/2016    PREGUR Negative 11/07/2022    PREGSERUM Negative 09/26/2024    HCG <2.00 04/10/2014    RPR Non-Reactive 12/07/2022    HGBA1C 5.6 01/24/2025     01/24/2025           Recommended Treatment:     All current active medications have been reviewed  Encourage group therapy, milieu therapy and occupational therapy  Behavioral Health checks for safety monitoring  Continue treatment with group therapy, milieu therapy and occupational therapy  Continue current medications:  Current Facility-Administered Medications   Medication Dose Route Frequency Provider Last Rate    acetaminophen  650 mg Oral Q4H PRN Krunal Calzada, CRNP      acetaminophen  650 mg Oral Q4H PRN Krunal Calzada, CRNP      acetaminophen  975 mg Oral Q6H PRN Krunal Calzada, CRNP      albuterol  2 puff Inhalation Q4H PRN Krunal Calzada, CRNP      atorvastatin  10 mg Oral Daily Krunal Calzada, CRNP      benzonatate  100 mg Oral TID PRN Zandra Barclay PA-C      benztropine  1 mg Intramuscular Q4H PRN Max 6/day Krunal Calzada, CRNP      benztropine  0.5 mg Oral Q4H PRN Max 6/day Krunal Calzada, CRNP      cloZAPine  400 mg Oral HS Krunal Calzada, CRNP      vitamin B-12  500 mcg Oral Daily Krunal Calzada, JARED      docusate sodium  100 mg Oral BID Jeison Matias MD      fish oil  1,000 mg Oral Daily Zandra Barclay PA-C      fluticasone  1 spray Nasal Daily Krunal Calzada, JARED      fluticasone-vilanterol  1 puff Inhalation Daily Jayson Giles MD      folic acid  1 mg Oral Daily Krunal Calzada, CRKALLIE      hydrocortisone   Topical 4x Daily PRN Zandra Barclay PA-C      hydrOXYzine HCL  25 mg Oral Q6H PRN Max 4/day Krunal Calzada, CRNP      lamoTRIgine  100 mg Oral BID JARED Carson      LORazepam  1 mg Intramuscular Q6H PRN  Max 3/day Krunal Glass-Anom, CRNP      LORazepam  0.5 mg Oral Q6H PRN Max 4/day Krunal Quan-Anom, CRNP      LORazepam  1 mg Oral Q6H PRN Max 3/day Krunal Quan-Heikem, CRNP      methocarbamol  500 mg Oral Q6H PRN Zandra Barclay PA-C      multivitamin-minerals  1 tablet Oral Daily Krunal Calzada, CRNP      nicotine  14 mg Transdermal Daily Jeison Matias MD      nystatin  500,000 Units Swish & Swallow 4x Daily Zandra Barclay PA-C      OLANZapine  5 mg Intramuscular Q3H PRN Max 3/day Krunal Quan-Anom, CRNP      OLANZapine  2.5 mg Oral Q4H PRN Max 6/day Krunal Glass-Heikem, CRNP      OLANZapine  5 mg Oral Q4H PRN Max 3/day Krunal Glass-Heikem, CRNP      OLANZapine  5 mg Oral Q3H PRN Max 3/day Krunal Quan-Heikem, CRNP      oxybutynin  5 mg Oral BID Krunal Quan-Niels, CRNP      pantoprazole  40 mg Oral Daily Before Breakfast Krunal Calzada, CRNP      polyethylene glycol  17 g Oral Daily PRN Zandra Barclay PA-C      risperiDONE  2 mg Oral BID Krunal QuanYeni, CRKALLIE      senna-docusate sodium  1 tablet Oral HS Krunal Quan-Heikem, CRNP      thiamine  100 mg Oral Daily Krunal QuanJune, CRNP      traZODone  50 mg Oral HS PRN [facility], CRNP      venlafaxine  75 mg Oral Daily Jeison Matias MD         Risks / Benefits of Treatment:     Risks, benefits, and possible side effects of medications explained to patient including risks of agranulocytosis related to treatment with Clozaril. Patient has limited understanding of risks and benefits treatment at this time, but agrees to take medications as prescribed.    Counseling / Coordination of Care:     Patient's progress reviewed with nursing staff.  Medications, treatment progress and treatment plan reviewed with patient.  Supportive counseling provided to the patient.          JARED Darling

## 2025-01-26 NOTE — NURSING NOTE
"Patient with sudden onset of yelling out and slamming room door multiple times. Patient appeared anxious/frustrated, endorsed high anxiety stating she is hearing \"light voices that are playing games with me\". Withdrawn to room, resting in bed. PRN Ativan 1 mg PO given for Martines score of 25. Remains on 15\" checks for safety and behaviors.    "

## 2025-01-26 NOTE — NURSING NOTE
Pt was withdrawn to room this evening. Pt endorsed moderate anxiety, fixated on belongings, reassured they would be sent over via  tomorrow am. Pt was pleasant, cooperative, and med compliant. Will CTM. Q15 pt safety checks ongoing.

## 2025-01-26 NOTE — PROGRESS NOTES
Progress Note - Jo-Ann Lamb 54 y.o. female MRN: [id number]    Unit/Bed#: [facility]02 Encounter: [id number]        Subjective:   Patient seen and examined at bedside after reviewing the chart and discussing the case with the caring staff.      Patient examined at bedside.  Patient has no acute symptoms.    Physical Exam   Vitals: Blood pressure 124/81, pulse 90, temperature 98.1 °F (36.7 °C), temperature source Temporal, resp. rate 17, height 5' (1.524 m), weight 70.3 kg (155 lb), last menstrual period 01/01/2020, SpO2 90%, not currently breastfeeding.,Body mass index is 30.27 kg/m².  Constitutional: Patient in no acute distress.  HEENT: PERR, EOMI, MMM.  Cardiovascular: Normal rate and regular rhythm.    Pulmonary/Chest: Effort normal and breath sounds normal.   Abdomen: Soft, + BS, NT.    Assessment/Plan:  Jo-Ann Lamb is a(n) 54 y.o. female with schizoaffective DO, bipolar type.      Asthma.  Patient is on Breo Ellipta daily (Advair nonform), albuterol inhaler as needed.   Allergic rhinitis.  Continue Flonase nasal spray daily.   Hyperlipidemia.  Continue atorvastatin 10 mg daily.  Add fish oil daily 1/25.  GERD.  Patient on Protonix 40 mg daily.   Urge incontinence.  Continue oxybutynin 5 mg twice daily.   Constipation/hemorrhoids.  Continue colace twice daily, senokot S at bedtime and Miralax as needed.  Apply Anusol-HC as needed.  Tobacco abuse.  NRT.   Alcohol abuse.  Patient started on thiamine, folic acid, MVI.   Vitamin D deficiency.  Continue vitamin D3 1000 units daily.  Vitamin B12 deficiency.  Continue B12 500 mcg daily.    Chronic lower back pain.  Tylenol or Robaxin as needed.   Thrush.  Nystatin swish and swallow 4x daily x 7 days.     The patient was discussed with Dr. Steel and he is in agreement with the above note.

## 2025-01-26 NOTE — PLAN OF CARE
Problem: Alteration in Thoughts and Perception  Goal: Verbalize thoughts and feelings  Description: Interventions:  - Promote a nonjudgmental and trusting relationship with the patient through active listening and therapeutic communication  - Assess patient's level of functioning, behavior and potential for risk  - Engage patient in 1 on 1 interactions  - Encourage patient to express fears, feelings, frustrations, and discuss symptoms    - [place] patient to reality, help patient recognize reality-based thinking   - Administer medications as ordered and assess for potential side effects  - Provide the patient education related to the signs and symptoms of the illness and desired effects of prescribed medications  Outcome: Progressing     Problem: Risk for Self Injury/Neglect  Goal: Refrain from harming self  Description: Interventions:  - Monitor patient closely, per order  - Develop a trusting relationship  - Supervise medication ingestion, monitor effects and side effects   Outcome: Progressing

## 2025-01-26 NOTE — NURSING NOTE
"Patient out for breakfast then returned to room to rest in bed, naps at intervals, easily arouses. Irritable edge noted. Patient endorsed anxiety and depression, stated \"I'm not feeling well\". Denies SI/HI, hallucinations. Patient requested PRN Ativan for increased anxiety, PRN Ativan 0.5 mg PO given for Martines score of 18, minimal effect noted. Patient later came out stating she feels restless, noted to be frequently getting in and out of bed, agitated, and has racing thoughts. Requested PRN medication. Zyprexa 5 mg PO given as ordered. Currently with staff going over belongings. Patient unable to state when she had her last BM. Abdomen soft, non tender, non distended with positive bowel sounds x 4. No N/V. Remains medication compliant and on 15\" checks for safety and behaviors.  "

## 2025-01-27 ENCOUNTER — TELEPHONE (OUTPATIENT)
Age: 55
End: 2025-01-27

## 2025-01-27 LAB — CLOZAPINE SERPL-MCNC: 614 NG/ML (ref 350–900)

## 2025-01-27 PROCEDURE — GZHZZZZ GROUP PSYCHOTHERAPY: ICD-10-PCS | Performed by: PSYCHIATRY & NEUROLOGY

## 2025-01-27 PROCEDURE — 99233 SBSQ HOSP IP/OBS HIGH 50: CPT | Performed by: PSYCHIATRY & NEUROLOGY

## 2025-01-27 RX ORDER — GUAIFENESIN 600 MG/1
600 TABLET, EXTENDED RELEASE ORAL EVERY 12 HOURS SCHEDULED
Status: DISCONTINUED | OUTPATIENT
Start: 2025-01-27 | End: 2025-01-31 | Stop reason: HOSPADM

## 2025-01-27 RX ADMIN — VENLAFAXINE HYDROCHLORIDE 75 MG: 75 CAPSULE, EXTENDED RELEASE ORAL at 08:48

## 2025-01-27 RX ADMIN — GUAIFENESIN 600 MG: 600 TABLET ORAL at 21:29

## 2025-01-27 RX ADMIN — NYSTATIN 500000 UNITS: 100000 SUSPENSION ORAL at 17:00

## 2025-01-27 RX ADMIN — LAMOTRIGINE 100 MG: 100 TABLET ORAL at 17:01

## 2025-01-27 RX ADMIN — POLYETHYLENE GLYCOL 3350 17 G: 17 POWDER, FOR SOLUTION ORAL at 17:01

## 2025-01-27 RX ADMIN — RISPERIDONE 2 MG: 2 TABLET, FILM COATED ORAL at 17:01

## 2025-01-27 RX ADMIN — DOCUSATE SODIUM 100 MG: 100 CAPSULE, LIQUID FILLED ORAL at 08:49

## 2025-01-27 RX ADMIN — LAMOTRIGINE 100 MG: 100 TABLET ORAL at 08:47

## 2025-01-27 RX ADMIN — NYSTATIN 500000 UNITS: 100000 SUSPENSION ORAL at 13:12

## 2025-01-27 RX ADMIN — RISPERIDONE 2 MG: 2 TABLET, FILM COATED ORAL at 08:49

## 2025-01-27 RX ADMIN — OMEGA-3 FATTY ACIDS CAP 1000 MG 1000 MG: 1000 CAP at 08:48

## 2025-01-27 RX ADMIN — LORAZEPAM 1 MG: 1 TABLET ORAL at 09:37

## 2025-01-27 RX ADMIN — FOLIC ACID 1 MG: 1 TABLET ORAL at 08:47

## 2025-01-27 RX ADMIN — ATORVASTATIN CALCIUM 10 MG: 10 TABLET, FILM COATED ORAL at 08:49

## 2025-01-27 RX ADMIN — NYSTATIN 500000 UNITS: 100000 SUSPENSION ORAL at 09:33

## 2025-01-27 RX ADMIN — OLANZAPINE 2.5 MG: 2.5 TABLET, FILM COATED ORAL at 09:37

## 2025-01-27 RX ADMIN — FLUTICASONE FUROATE AND VILANTEROL TRIFENATATE 1 PUFF: 200; 25 POWDER RESPIRATORY (INHALATION) at 08:45

## 2025-01-27 RX ADMIN — GUAIFENESIN 600 MG: 600 TABLET ORAL at 13:12

## 2025-01-27 RX ADMIN — DOCUSATE SODIUM 100 MG: 100 CAPSULE, LIQUID FILLED ORAL at 17:01

## 2025-01-27 RX ADMIN — THIAMINE HCL TAB 100 MG 100 MG: 100 TAB at 08:48

## 2025-01-27 RX ADMIN — OXYBUTYNIN CHLORIDE 5 MG: 5 TABLET ORAL at 17:01

## 2025-01-27 RX ADMIN — CYANOCOBALAMIN TAB 500 MCG 500 MCG: 500 TAB at 08:47

## 2025-01-27 RX ADMIN — OXYBUTYNIN CHLORIDE 5 MG: 5 TABLET ORAL at 08:47

## 2025-01-27 RX ADMIN — NYSTATIN 500000 UNITS: 100000 SUSPENSION ORAL at 21:29

## 2025-01-27 RX ADMIN — NICOTINE 14 MG: 14 PATCH, EXTENDED RELEASE TRANSDERMAL at 08:51

## 2025-01-27 RX ADMIN — Medication 1 TABLET: at 08:47

## 2025-01-27 RX ADMIN — SENNOSIDES AND DOCUSATE SODIUM 1 TABLET: 50; 8.6 TABLET ORAL at 21:29

## 2025-01-27 RX ADMIN — FLUTICASONE PROPIONATE 1 SPRAY: 50 SPRAY, METERED NASAL at 08:47

## 2025-01-27 RX ADMIN — CLOZAPINE 350 MG: 100 TABLET ORAL at 21:29

## 2025-01-27 RX ADMIN — PANTOPRAZOLE SODIUM 40 MG: 40 TABLET, DELAYED RELEASE ORAL at 08:47

## 2025-01-27 NOTE — PROGRESS NOTES
Discussed with patient: AUDIT score of 3 UDS/Identified Substance(s) used: Alcohol  Risks discussed included: Physical/ Mental Health Risks  Recommendations discussed: OP D&A Services  Patient's response: Patient declined referral at this time

## 2025-01-27 NOTE — NURSING NOTE
Patient withdrawn to room throughout the day except for meals and to use the phone. She is endorsing high anxiety and depression today. She is also endorsing AH and VH but does not elaborate on her hallucinations. Denies SI/HI.  She states she has not had a BM in 3-4 days and states typically Senna (scheduled at ) works for her. PRN Miralax administered at 1701 to promote a BM. Abdomen is firm but non-tender. Bowel sounds positive x4. Ambulation and hydration encouraged.

## 2025-01-27 NOTE — PROGRESS NOTES
01/27/25 1116   Team Meeting   Meeting Type Tx Team Meeting   Initial Conference Date 01/27/25   Next Conference Date 02/27/25   Team Members Present   Team Members Present Physician;Nurse;   Physician Team Member Dr. Jayson Giles   Nursing Team Member Missy Hernandez RN   Care Management Team Member Minerva Epperson RN   Patient/Family Present   Patient Present Yes   Patient's Family Present No     Initial Plan  Treatment Team met and reviewed patient strengths, limitations, coping skills, Treatment Plan and Goals; patient reported understanding and agreement and signed the Treatment Plan document. A copy was placed on the chart.

## 2025-01-27 NOTE — CASE MANAGEMENT
OLIVE placed call to patient's sisterNancy: [phone] for family notification. Message indicated that the number is not in working order. Will check with the patient re: an alternate number.    OLIVE spoke with patient's PCP office, Dr. Lew: [phone] for admission notification. Patient has an upcoming appt scheduled for Monday, 2/3/25 at 3:15 PM. Appt. Will be kept at present; office to be notified of need to reschedule due to LOS>

## 2025-01-27 NOTE — PLAN OF CARE
Problem: Alteration in Thoughts and Perception  Goal: Attend and participate in unit activities, including therapeutic, recreational, and educational groups  Description: Interventions:  -Encourage Visitation and family involvement in care  1/27/2025 0648 by Edna Green  Outcome: Progressing  1/27/2025 0646 by Edna Green  Outcome: Not Progressing     Problem: Ineffective Coping  Goal: Participates in unit activities  Description: Interventions:  - Provide therapeutic environment   - Provide required programming   - Redirect inappropriate behaviors   1/27/2025 0648 by Edna Green  Outcome: Progressing  1/27/2025 0646 by Edna Green  Outcome: Progressing     Problem: Risk for Self Injury/Neglect  Goal: Attend and participate in unit activities, including therapeutic, recreational, and educational groups  Description: Interventions:  - Provide therapeutic and educational activities daily, encourage attendance and participation, and document same in the medical record  - Obtain collateral information, encourage visitation and family involvement in care   1/27/2025 0648 by Edna Green  Outcome: Progressing  1/27/2025 0646 by Edna Green  Outcome: Progressing   Patient out for 1 group over weekend, will encourage groups attendance.

## 2025-01-27 NOTE — NURSING NOTE
"Upon reassessment, patient is withdrawn and lying in bed. She reports feeling better and states she feels \"foggy\" now. Medication effective.  "

## 2025-01-27 NOTE — NURSING NOTE
Pt was withdrawn to room this evening. Pt endorsed anxiety and depression, denied all other psych symptoms. Pt was cooperative and med compliant. No behavioral outbursts observed. Will CTM. Q15 minute pt safety checks ongoing.

## 2025-01-27 NOTE — PROGRESS NOTES
01/27/25   Team Meeting   Meeting Type Daily Rounds   Team Members Present   Team Members Present Physician;Nurse;;Occupational Therapist   Physician Team Member Tisha COLEMAN   Nursing Team Member David RN   Social Work Team Member Florian BURROUGHS   OT Team Member Peter CTRS   Patient/Family Present   Patient Present No   Patient's Family Present No   201, new admit, [place] NESHA and [facility] Act, slept, endorses anx/dep, pos meth and alcohol, withdrawn to room, outbursts, Multiple Ativan PRN

## 2025-01-27 NOTE — ASSESSMENT & PLAN NOTE
-Continue Clozaril 400 mg at bedtime, Clozapine level 1,127 on 1/24. Denies side effects besides mild hypersalivation.   -Continue recently titrated Effexor XR 75 mg daily for anxiety and depressive symptoms  -Continue Risperdal 2 mg twice daily   Biopsy Method: Dermablade

## 2025-01-27 NOTE — TREATMENT TEAM
01/27/25 0937   Martines Anxiety Scale   Anxious Mood 4   Tension 4   Fears 4   Insomnia 2   Intellectual 4   Depressed Mood 4   Somatic Complaints: Muscular 0   Somatic Complaints: Sensory 0   Cardiovascular Symptoms 0   Respiratory Symptoms 1   Gastrointestinal Symptoms 1   Genitourinary Symptoms 0   Autonomic Symptoms 0   Behavior at Interview 2   Martines Anxiety Score 26   Broset Violence Checklist   Assessment type Shift   Irritability 0   Confusion 0   Boisterousness 0   Threatening physical violence 0   Verbal threats 0   Violence 0   Broset score 0     Patient is having AVH but will not elaborate on what she is seeing or hearing. She is having increased anxiety and agitation due to this. 1mg Ativan and 2.5mg Zyprexa administered at 0937 as indicated by Martines and Broset scores.

## 2025-01-27 NOTE — PROGRESS NOTES
Progress Note - Behavioral Health   Name: Jo-Ann Lamb 54 y.o. female I MRN: [id number]   Unit/Bed#: [facility] 209-02 I Date of Admission: 1/24/2025   Date of Service: 1/27/2025 I Hospital Day: 3         Assessment & Plan  Schizoaffective disorder, bipolar type (HCC)    Degenerative disc disease, lumbar    Gastroesophageal reflux disease    Hyperlipidemia    Post-traumatic stress disorder, chronic    Recommended Treatment:     Planned medication and treatment changes:    All current active medications have been reviewed  Encourage group therapy, milieu therapy and occupational therapy  Behavioral Health checks for safety monitoring  Clozaril 350 mg po hs.  Monitor behavior and fall risk.      Current medications:  Current Facility-Administered Medications   Medication Dose Route Frequency Provider Last Rate    acetaminophen  650 mg Oral Q4H PRN Krunal Calzada, CRNP      acetaminophen  650 mg Oral Q4H PRN Krunal Calzada, CRNP      acetaminophen  975 mg Oral Q6H PRN Krunal Calzada, CRKALLIE      albuterol  2 puff Inhalation Q4H PRN Krunal Calzada, JARED      atorvastatin  10 mg Oral Daily Krunal Calzada, CRNP      benzonatate  100 mg Oral TID PRN Zandra Barclay PA-C      benztropine  1 mg Intramuscular Q4H PRN Max 6/day Krunal Calzada, CRNP      benztropine  0.5 mg Oral Q4H PRN Max 6/day Krunal Calzada, CRNP      cloZAPine  350 mg Oral HS Jayson Giles MD      vitamin B-12  500 mcg Oral Daily Krunal Calzada, JARED      docusate sodium  100 mg Oral BID Jeison Matias MD      fish oil  1,000 mg Oral Daily Zandra Barclay PA-C      fluticasone  1 spray Nasal Daily Krunal Calzada, JARED      fluticasone-vilanterol  1 puff Inhalation Daily Jayson Giles MD      folic acid  1 mg Oral Daily Krunal Calzada, JARED      guaiFENesin  600 mg Oral Q12H KENNY Steel MD      hydrocortisone   Topical 4x Daily PRN Zandra Barclay PA-C      hydrOXYzine HCL  25 mg  Oral Q6H PRN Max 4/day Krunal Quan-Anom, CRNP      lamoTRIgine  100 mg Oral BID Krunal Glass-Niels, CRNP      LORazepam  1 mg Intramuscular Q6H PRN Max 3/day Krunal Quan-Anom, CRNP      LORazepam  0.5 mg Oral Q6H PRN Max 4/day Krunal Quan-Anom, CRNP      LORazepam  1 mg Oral Q6H PRN Max 3/day Krunal Glass-Niels, CRNP      methocarbamol  500 mg Oral Q6H PRN Zandra Barclay PA-C      multivitamin-minerals  1 tablet Oral Daily Krunal Calzada, CRNP      nicotine  14 mg Transdermal Daily Jeison Matias MD      nystatin  500,000 Units Swish & Swallow 4x Daily Zandra Barclay PA-C      OLANZapine  5 mg Intramuscular Q3H PRN Max 3/day Krunal Glass-Niels, CRNP      OLANZapine  2.5 mg Oral Q4H PRN Max 6/day Krunal Quan-Niels, CRNP      OLANZapine  5 mg Oral Q4H PRN Max 3/day Krunal Quan-Heikem, CRNP      OLANZapine  5 mg Oral Q3H PRN Max 3/day Krunal Quan-Niels, CRNP      oxybutynin  5 mg Oral BID Krunal Quan-Niels, CRNP      pantoprazole  40 mg Oral Daily Before Breakfast Krunal Calzada, CRNP      polyethylene glycol  17 g Oral Daily PRN Zandra Barclay PA-C      risperiDONE  2 mg Oral BID Krunal Quan-Heike, CRNP      senna-docusate sodium  1 tablet Oral HS Krunal Quan-Ano, CRNP      thiamine  100 mg Oral Daily Krunal Quan-Niels, CRNP      traZODone  50 mg Oral HS PRN Krunal QuanJune, CRNP      venlafaxine  75 mg Oral Daily Jeison Matias MD         Risks / Benefits of Treatment:    Risks, benefits, and possible side effects of medications explained to patient and patient verbalizes understanding and agreement for treatment.    Subjective:    Behavior over the last 24 hours: unchanged.     Jo-Ann CUMMINGS seen today for continuing care. Patient states her anxiety is milder with the  medication adjustment but states her depression has not improved. Expresses fleeting SI, stating that these thoughts come and go. Denies HI. Denies AVH today, but explains  "recent auditory hallucinations of voices telling her to hurt herself and visual hallucinations of ghosts and monsters. States she has an \"okay\" appetite and has been sleeping well. Medication compliant and denies side effects. Limited participation in milieu.    Sleep: normal  Appetite: fair  Medication side effects: No   ROS: no complaints, all other systems are negative    Mental Status Evaluation:    Appearance:  dressed in hospital attire   Behavior:  cooperative, calm   Speech:  delayed, soft   Mood:  depressed   Affect:  constricted   Thought Process:  decreased rate of thoughts   Associations: concrete associations   Thought Content:  paranoid ideation   Perceptual Disturbances: no visual hallucinations, denies auditory hallucinations when asked   Risk Potential: Suicidal ideation - Yes, fleeting suicidal thoughts  Homicidal ideation - None at present  Potential for aggression - No   Sensorium:  oriented to person, place, and time/date   Memory:  recent and remote memory grossly intact   Consciousness:  alert and awake   Attention/Concentration: attention span and concentration appear shorter than expected for age   Insight:  limited   Judgment: limited   Gait/Station: in bed   Motor Activity: no abnormal movements     Vital signs in last 24 hours:    Temp:  [97.4 °F (36.3 °C)-97.9 °F (36.6 °C)] 97.4 °F (36.3 °C)  HR:  [84-98] 91  BP: (110-146)/(74-89) 146/89  Resp:  [18-19] 18  SpO2:  [91 %-97 %] 95 %  O2 Device: None (Room air)         Laboratory results: I have personally reviewed all pertinent laboratory/tests results    Most Recent Labs:   Lab Results   Component Value Date    WBC 8.68 01/24/2025    RBC 4.36 01/24/2025    HGB 13.1 01/24/2025    HCT 40.7 01/24/2025     01/24/2025    RDW 13.9 01/24/2025    NEUTROABS 5.20 01/24/2025    SODIUM 133 (L) 01/24/2025    K 3.7 01/24/2025    CL 96 01/24/2025    CO2 26 01/24/2025    BUN 8 01/24/2025    CREATININE 0.82 01/24/2025    GLUC 137 01/24/2025    " CALCIUM 8.7 01/24/2025    AST 18 01/24/2025    ALT 17 01/24/2025    ALKPHOS 97 01/24/2025    TP 6.8 01/24/2025    ALB 4.4 01/24/2025    TBILI 0.21 01/24/2025    CHOLESTEROL 235 (H) 01/25/2025    HDL 68 01/25/2025    TRIG 433 (H) 01/25/2025    LDLCALC  01/25/2025      Comment:      Calculated LDL invalid, triglycerides >400 mg/dl  This screening LDL is a calculated result.   It does not have the accuracy of the Direct Measured LDL in the monitoring of patients with hyperlipidemia and/or statin therapy.   Direct Measure LDL (EOC065) must be ordered separately in these patients.    NONHDLC 167 01/25/2025    AMMONIA 28 10/27/2017    JEA5BLLXIWFW 0.790 01/24/2025    FREET4 0.89 03/24/2016    PREGUR Negative 11/07/2022    PREGSERUM Negative 09/26/2024    HCG <2.00 04/10/2014    SYPHILISAB Non-reactive 01/25/2025    HGBA1C 5.6 01/24/2025     01/24/2025       Counseling / Coordination of Care:    Patient's progress discussed with staff in treatment team meeting.  Medication changes reviewed with staff in treatment team meeting.  Cognitive techniques utilized during the session.  Group attendance encouraged.    Jayson Giles MD 01/27/25

## 2025-01-27 NOTE — PLAN OF CARE
Problem: DISCHARGE PLANNING - CARE MANAGEMENT  Goal: Discharge to post-acute care or home with appropriate resources  Outcome: Progressing  Patient is new to unit; 201; Initial Goal added

## 2025-01-27 NOTE — TELEPHONE ENCOUNTER
Minerva from the Behavioral health unit at [facility] called stating patient is currently admitted.  They will call back this Friday if patient is still an inpatient to reschedule the Monday, 2/3/25 appt at 3:15 pm.

## 2025-01-27 NOTE — PLAN OF CARE
Problem: Alteration in Thoughts and Perception  Goal: Verbalize thoughts and feelings  Description: Interventions:  - Promote a nonjudgmental and trusting relationship with the patient through active listening and therapeutic communication  - Assess patient's level of functioning, behavior and potential for risk  - Engage patient in 1 on 1 interactions  - Encourage patient to express fears, feelings, frustrations, and discuss symptoms    - [place] patient to reality, help patient recognize reality-based thinking   - Administer medications as ordered and assess for potential side effects  - Provide the patient education related to the signs and symptoms of the illness and desired effects of prescribed medications  Outcome: Progressing     Problem: Risk for Self Injury/Neglect  Goal: Refrain from harming self  Description: Interventions:  - Monitor patient closely, per order  - Develop a trusting relationship  - Supervise medication ingestion, monitor effects and side effects   Outcome: Progressing

## 2025-01-27 NOTE — PROGRESS NOTES
"Psycho Social   54 year old single female admitted on 1/24/25 from home under a 201, Voluntary Commitment with reported exacerbation of chronic mental illness including marked depression, AH; paranoid delusions, suicidal ideation with attempt by ingesting scheduled medications with \"a lot of whhiskey\".    Information obtained during pre-admission Crisis ED Eval is as follows:    Jo-Ann Lamb is a 53 y/o female, diagnosed with   Schizoaffective disorder, bipolar type, PTSD who presented to ED via private transportation due to:   Patient states she is feeling suicidal for the last few weeks. Patient states she took normal doses of meds last night and then drank a lot of whiskey in attempt to kill herself. States she also believes someone is abusing her in her home, but lives alone.  States someone keeps dosing her with marijuana and pills that make her feel bad.   Pt accompanied by an ACT team  Kj, both calm and cooperative. Pt appears to be oriented x4. Pt presents with a good eye contact. Currently lives in independent living facility, and has access to talk to staff at all times.   Pt reports increase in depression due to frustration with psychiatric problems. Pt states \"I am not getting better\". Pt reports she tried to kill self couple days ago by taking medication and drinking whisky. Pt states \"I do not want to live like this\". Pt denies homicidal ideation. Pt denies self harming behaviors. Pt states she continues to hear voices at times command nature. Pt also reports seeing monsters and gory pictures. Pt denies appetite problems but states at times she does not sleep as voices prevent her from sleeping. Pt unable to contract for safety at this time.            On interview, the patient was alert, oriented, quiet, guarded, with depressed mood/ flattened affect. She offered little awareness or disclosure re: stressors, contributing factors. Stated she did not know \"what happened; I do feel lonely and " "bored.\" Stated she has been cooperative with treatment offered by [facility] ACT.      As patient's most recent admission was a [facility] in 9/24, the Psychosocial obtained during that visit will be utilized as noted below:  Psycho Social :    Current SI:  None, reports none currently  Current HI: None  AVH:   reports none currently but appeared delayed in response  Depression:   reports dep present but getting better  Anxiety:   reports anx present but getting better     Strengths: supportive sister and ACT team, boyfriend  Stressors/Limitations:  mental health, AVH, always feeling that \"people are out to get me\"  Coping skills: Music, Kishor Chi, Meditation     SA/SI in last 12 months: None, pt reports thoughts consonantly but no plan, is \"always so depressed\"  HI/violence towards others in last 12 months: None  Access to Firearms: No  Hx abuse/trauma: pt reports yes but declined to provide details      Treatment History: multiple hospitalizations;  most recent [facility] 3/24 and 9/24     Current Treatment:                 Psychiatrist:  follow [facility]                Therapist: follow [facility]     Legal Issues: No current legal problems  Substance Abuse:   patient denies at present, UDS +  Does have a h/o alcohol abuse; sober for a few years.   Marial Status: has boyfriend, Binsid  Children: 2 daughters: has frequent phone contact with daughterGloria. No contact \"in years\" with daughterNeli, living in [place]  Pets: none  Can [facility] return [facility]?: [facility]  Family:              Parents: mother, supportive              Siblings: sister Nancy, biggest support  Family hx (MH/SI/HI/substance use): dad was bipolar schizophrenic       Type of Work: Currently unemployed, disabled. Had been employed in the [employer] in the 90'2.  Worked as a  until 2005.  Income/Financial Supports: government aid, disability  Education: some college  : never served  Transportation: assisted " with rides, no license or cars  Synagogue/Cultural Needs: none known  Assistive devices/phsyical barriers in home: none  POA/guardianship/advanced directives: none on file      Housing Stability-Dispo/211: 2 residences in the past year. Currently in stable Independent living   Transportation: ACT Team provides  Food Insecurity:  None  Intimate Partner Violence: Denied  Utilities:  No issue     Psychiatrist: Dr. Gallego/ ACT Team  Therapist: Tisha with the ACT Team  PCP: Dr. Longoria  D&A: None; declined referral  Case Management:  ACT Team provision  Family Contact: SisterNancy: [phone]       01/27/25 1005   Patient Intake   Living Arrangement Lives alone   Can patient return home? Yes   Address to be Discharge to: See Facesheet   Patient's Telephone Number See Facesheet   Access to Firearms No   Type of Work disable   Work History Disabled   School Grade/Year College freshman   Admission Status   Status of Admission 201   County of Residence [place]   Patient History   Presenting Problems Reported ongoing depression/ AH/ delusions/ Suicide attempt by drinking with meds   Treatment History Long-standing; Multiple past  AIP Rx's; Currently involved with ACT Team supports   Currently in Treatment Yes   Current Psychiatrist/Therapist Dr. Gallego   Name of ICM: Kj/ [facility]   ICM Phone Number: [phone]   Community Agency Supports ACT Team   Medical Problems See Medical H&P   Legal Issues None reported   Probation/ Name (if applicable) NA   Substance Abuse Yes (See BH History section for detail)

## 2025-01-27 NOTE — PROGRESS NOTES
Progress Note - Jo-Ann Lamb 54 y.o. female MRN: [id number]    Unit/Bed#: [facility] 209-02 Encounter: [id number]        Subjective:   Patient seen and examined at bedside after reviewing the chart and discussing the case with the caring staff.      Patient examined at bedside.  Patient reports that she feels congested in her chest and around the nose.  Patient is requesting something for it.  Patient also feels constipated.    Physical Exam   Vitals: Blood pressure 146/89, pulse 91, temperature (!) 97.4 °F (36.3 °C), temperature source Temporal, resp. rate 18, height 5' (1.524 m), weight 70.3 kg (155 lb), last menstrual period 01/01/2020, SpO2 95%, not currently breastfeeding.,Body mass index is 30.27 kg/m².  Constitutional: Patient in no acute distress.  HEENT: PERR, EOMI, MMM.  Cardiovascular: Normal rate and regular rhythm.    Pulmonary/Chest: Effort normal and breath sounds normal.   Abdomen: Soft, + BS, NT.    Assessment/Plan:  Jo-Ann Lamb is a(n) 54 y.o. female with schizoaffective DO, bipolar type.      Asthma.  Patient is on Breo Ellipta daily (Advair nonform), albuterol inhaler as needed.   Allergic rhinitis.  Continue Flonase nasal spray daily.   Hyperlipidemia.  Continue atorvastatin 10 mg daily.  Add fish oil daily 1/25.  GERD.  Patient on Protonix 40 mg daily.   Urge incontinence.  Continue oxybutynin 5 mg twice daily.   Constipation/hemorrhoids.  Continue colace twice daily, senokot S at bedtime and Miralax as needed.  Apply Anusol-HC as needed.  Tobacco abuse.  NRT.   Alcohol abuse.  Patient started on thiamine, folic acid, MVI.   Vitamin D deficiency.  Continue vitamin D3 1000 units daily.  Vitamin B12 deficiency.  Continue B12 500 mcg daily.    Chronic lower back pain.  Tylenol or Robaxin as needed.   Thrush.  Nystatin swish and swallow 4x daily x 7 days.   Cough and congestion.  Patient will be put on Mucinex 600 mg twice daily 1/27/2025.

## 2025-01-28 PROCEDURE — GZ50ZZZ INDIVIDUAL PSYCHOTHERAPY, INTERACTIVE: ICD-10-PCS | Performed by: PSYCHIATRY & NEUROLOGY

## 2025-01-28 PROCEDURE — 99232 SBSQ HOSP IP/OBS MODERATE 35: CPT | Performed by: PSYCHIATRY & NEUROLOGY

## 2025-01-28 RX ORDER — LACTULOSE 10 G/15ML
20 SOLUTION ORAL 2 TIMES DAILY PRN
Status: DISCONTINUED | OUTPATIENT
Start: 2025-01-28 | End: 2025-01-31 | Stop reason: HOSPADM

## 2025-01-28 RX ORDER — VENLAFAXINE HYDROCHLORIDE 150 MG/1
150 CAPSULE, EXTENDED RELEASE ORAL DAILY
Status: DISCONTINUED | OUTPATIENT
Start: 2025-01-29 | End: 2025-01-31 | Stop reason: HOSPADM

## 2025-01-28 RX ADMIN — LORAZEPAM 1 MG: 1 TABLET ORAL at 13:57

## 2025-01-28 RX ADMIN — GUAIFENESIN 600 MG: 600 TABLET ORAL at 08:30

## 2025-01-28 RX ADMIN — VENLAFAXINE HYDROCHLORIDE 75 MG: 75 CAPSULE, EXTENDED RELEASE ORAL at 08:30

## 2025-01-28 RX ADMIN — OMEGA-3 FATTY ACIDS CAP 1000 MG 1000 MG: 1000 CAP at 08:30

## 2025-01-28 RX ADMIN — OXYBUTYNIN CHLORIDE 5 MG: 5 TABLET ORAL at 08:30

## 2025-01-28 RX ADMIN — NYSTATIN 500000 UNITS: 100000 SUSPENSION ORAL at 21:22

## 2025-01-28 RX ADMIN — LAMOTRIGINE 100 MG: 100 TABLET ORAL at 08:30

## 2025-01-28 RX ADMIN — PANTOPRAZOLE SODIUM 40 MG: 40 TABLET, DELAYED RELEASE ORAL at 06:10

## 2025-01-28 RX ADMIN — FOLIC ACID 1 MG: 1 TABLET ORAL at 08:30

## 2025-01-28 RX ADMIN — OXYBUTYNIN CHLORIDE 5 MG: 5 TABLET ORAL at 17:51

## 2025-01-28 RX ADMIN — ATORVASTATIN CALCIUM 10 MG: 10 TABLET, FILM COATED ORAL at 08:30

## 2025-01-28 RX ADMIN — THIAMINE HCL TAB 100 MG 100 MG: 100 TAB at 08:30

## 2025-01-28 RX ADMIN — CLOZAPINE 350 MG: 100 TABLET ORAL at 21:21

## 2025-01-28 RX ADMIN — LAMOTRIGINE 100 MG: 100 TABLET ORAL at 17:51

## 2025-01-28 RX ADMIN — HYDROXYZINE HYDROCHLORIDE 25 MG: 25 TABLET ORAL at 01:04

## 2025-01-28 RX ADMIN — NICOTINE 14 MG: 14 PATCH, EXTENDED RELEASE TRANSDERMAL at 08:29

## 2025-01-28 RX ADMIN — NYSTATIN 500000 UNITS: 100000 SUSPENSION ORAL at 17:51

## 2025-01-28 RX ADMIN — RISPERIDONE 2 MG: 2 TABLET, FILM COATED ORAL at 17:51

## 2025-01-28 RX ADMIN — HYDROXYZINE HYDROCHLORIDE 25 MG: 25 TABLET ORAL at 21:21

## 2025-01-28 RX ADMIN — RISPERIDONE 2 MG: 2 TABLET, FILM COATED ORAL at 08:30

## 2025-01-28 RX ADMIN — MAGNESIUM HYDROXIDE 30 ML: 400 SUSPENSION ORAL at 13:57

## 2025-01-28 RX ADMIN — GUAIFENESIN 600 MG: 600 TABLET ORAL at 21:21

## 2025-01-28 RX ADMIN — OLANZAPINE 2.5 MG: 2.5 TABLET, FILM COATED ORAL at 13:57

## 2025-01-28 RX ADMIN — NYSTATIN 500000 UNITS: 100000 SUSPENSION ORAL at 08:32

## 2025-01-28 RX ADMIN — SENNOSIDES AND DOCUSATE SODIUM 1 TABLET: 50; 8.6 TABLET ORAL at 21:21

## 2025-01-28 RX ADMIN — DOCUSATE SODIUM 100 MG: 100 CAPSULE, LIQUID FILLED ORAL at 17:51

## 2025-01-28 RX ADMIN — Medication 1 TABLET: at 08:30

## 2025-01-28 RX ADMIN — NYSTATIN 500000 UNITS: 100000 SUSPENSION ORAL at 12:55

## 2025-01-28 RX ADMIN — FLUTICASONE PROPIONATE 1 SPRAY: 50 SPRAY, METERED NASAL at 08:31

## 2025-01-28 RX ADMIN — DOCUSATE SODIUM 100 MG: 100 CAPSULE, LIQUID FILLED ORAL at 08:30

## 2025-01-28 RX ADMIN — CYANOCOBALAMIN TAB 500 MCG 500 MCG: 500 TAB at 08:30

## 2025-01-28 NOTE — TREATMENT TEAM
01/28/25 0106   [place] Anxiety Scale   Anxious Mood 1   Tension 1   Fears 1   Insomnia 2   Intellectual 1   Depressed Mood 1   Somatic Complaints: Muscular 1   Somatic Complaints: Sensory 1   Cardiovascular Symptoms 1   Respiratory Symptoms 1   Gastrointestinal Symptoms 1   Genitourinary Symptoms 1   Autonomic Symptoms 1   Behavior at Interview 1   [place] Anxiety Score 15     25 mg Atarax administered as ordered per pt's request for mild anxiety. Pt stated that she woke up with racing thoughts, and was having trouble calming down. Attempted to use coping skills. Will continue to monitor. Safety checks continued.

## 2025-01-28 NOTE — NURSING NOTE
Upon reassessment, patient is resting in bed with eyes closed. Respirations even and unlabored. She appears comfortable and content. Medication seemingly effective.

## 2025-01-28 NOTE — PROGRESS NOTES
Progress Note - Jo-Ann Lamb 54 y.o. female MRN: [id number]    Unit/Bed#: [facility] 209-02 Encounter: [id number]        Subjective:   Patient seen and examined at bedside after reviewing the chart and discussing the case with the caring staff.      Patient examined at bedside.  Patient complaining of ongoing constipation.  She states her last BM was few days ago.  Requesting MOM.  Denies any abdominal pain, nausea, vomiting.     Physical Exam   Vitals: Blood pressure 123/87, pulse 95, temperature (!) 97.4 °F (36.3 °C), temperature source Temporal, resp. rate 18, height 5' (1.524 m), weight 70.3 kg (155 lb), last menstrual period 01/01/2020, SpO2 94%, not currently breastfeeding.,Body mass index is 30.27 kg/m².  Constitutional: Patient in no acute distress.  HEENT: PERR, EOMI, MMM.  Cardiovascular: Normal rate and regular rhythm.    Pulmonary/Chest: Effort normal and breath sounds normal.   Abdomen: Soft, + BS, NT.    Assessment/Plan:  Jo-Ann Lamb is a(n) 54 y.o. female with schizoaffective disorder, bipolar type.      Asthma.  Patient is on Breo Ellipta daily (Advair nonform), albuterol inhaler as needed.   Allergic rhinitis.  Continue Flonase nasal spray daily.   Hyperlipidemia.  Continue atorvastatin 10 mg daily.  Add fish oil daily 1/25.  GERD.  Patient on Protonix 40 mg daily.   Urge incontinence.  Continue oxybutynin 5 mg twice daily.   Constipation/hemorrhoids.  Continue colace twice daily, Senokot S at bedtime.  Apply Anusol-HC as needed.  MOM and lactulose as needed.  Tobacco abuse.  NRT.   Alcohol abuse.  Patient started on thiamine, folic acid, MVI.   Vitamin D deficiency.  Continue vitamin D3 1000 units daily.  Vitamin B12 deficiency.  Continue B12 500 mcg daily.    Chronic lower back pain.  Tylenol or Robaxin as needed.   Thrush.  Nystatin swish and swallow 4x daily x 7 days.   Cough/congestion.  Pt started on Mucinex 600 mg twice daily 1/27/25.    The patient was discussed with Dr. Steel and he is in  agreement with the above note.

## 2025-01-28 NOTE — NURSING NOTE
"ORTHOPEDIC DISCHARGE SUMMARY       Shiela Goldsmith,  1962, MRN [id number]    Pt is visible in the milieu, calm and cooperative  Pt ate 100% of dinner  Pt in behavioral control  Pt asking how many shots she will receive for her Hepatitis B and was informed it is one shot  Pt with no complaints or concerns  Medication complaint  Will monitor    Ivis Bolton PA-C/Dr. Mcmanus  [facility]  [phone]  Date: 1/28/2025  Time: 12:01 PM

## 2025-01-28 NOTE — PROGRESS NOTES
01/28/25 0907   Activity/Group Checklist   Group Admission/Discharge   Attendance Attended   Attendance Duration (min) 0-15   Interactions Interacted appropriately   Affect/Mood Appropriate   Goals Achieved Identified feelings;Discussed coping strategies;Able to listen to others;Able to engage in interactions;Able to reflect/comment on own behavior;Able to manage/cope with feelings;Able to self-disclose;Able to recieve feedback     Patient agreeable to meet and complete self assessment with [facility].  Patient information from form can be found in media.

## 2025-01-28 NOTE — NURSING NOTE
Patient withdrawn to her room throughout the day and is requesting to eat in her room. She reports not feeling well. VSS and she is afebrile.  She continues to endorse anxiety and depression. Denies hallucinations today but discussed how she ruminates on the hallucinations and it causes her to feel scared and anxious.  She continues to report constipation and no BM x5 days. PRN MOM administered at 1357. Will monitor for effectiveness.

## 2025-01-28 NOTE — PLAN OF CARE
Problem: Depression  Goal: Refrain from isolation  Description: Interventions:  - Develop a trusting relationship   - Encourage socialization   Outcome: Not Progressing     Problem: Anxiety  Goal: Anxiety is at manageable level  Description: Interventions:  - Assess and monitor patient's anxiety level.   - Monitor for signs and symptoms (heart palpitations, chest pain, shortness of breath, headaches, nausea, feeling jumpy, restlessness, irritable, apprehensive).   - Collaborate with interdisciplinary team and initiate plan and interventions as ordered.  - [place] patient to unit/surroundings  - Explain treatment plan  - Encourage participation in care  - Encourage verbalization of concerns/fears  - Identify coping mechanisms  - Assist in developing anxiety-reducing skills  - Administer/offer alternative therapies  - Limit or eliminate stimulants  Outcome: Not Progressing

## 2025-01-28 NOTE — ASSESSMENT & PLAN NOTE
-Continue Clozaril 350 mg at bedtime, Clozapine level 1,127 on 1/24. Denies side effects besides mild hypersalivation.   -Increase Effexor  mg daily for anxiety and depressive symptoms  -Continue Risperdal 2 mg twice daily

## 2025-01-28 NOTE — PROGRESS NOTES
01/28/25   Team Meeting   Meeting Type Daily Rounds   Team Members Present   Team Members Present Physician;Nurse;;Occupational Therapist   Physician Team Member Tisha COLEMAN   Nursing Team Member David JIMENES   Social Work Team Member Florian BURROUGHS   OT Team Member Peter CTRS   Patient/Family Present   Patient Present No   Patient's Family Present No   201, slept, multiple PRN, visible for meals, compliant, endorses anx, attends groups as tolerated, no d/c date med adjust

## 2025-01-28 NOTE — PLAN OF CARE
Problem: Depression  Goal: Verbalize thoughts and feelings  Description: Interventions:  - Assess and re-assess patient's level of risk   - Engage patient in 1:1 interactions, daily, for a minimum of 15 minutes   - Encourage patient to express feelings, fears, frustrations, hopes   Outcome: Progressing  Goal: Refrain from isolation  Description: Interventions:  - Develop a trusting relationship   - Encourage socialization   Outcome: Not Progressing

## 2025-01-28 NOTE — TREATMENT TEAM
01/28/25 1357   Martines Anxiety Scale   Anxious Mood 4   Tension 4   Fears 1   Insomnia 4   Intellectual 3   Depressed Mood 3   Somatic Complaints: Muscular 1   Somatic Complaints: Sensory 1   Cardiovascular Symptoms 0   Respiratory Symptoms 0   Gastrointestinal Symptoms 3   Genitourinary Symptoms 0   Autonomic Symptoms 1   Behavior at Interview 3   Martines Anxiety Score 28   Broset Violence Checklist   Assessment type Shift   Irritability 0   Confusion 0   Boisterousness 0   Threatening physical violence 0   Verbal threats 0   Violence 0   Broset score 0     1mg Ativan (for severe anxiety) administered with 2.5mg Zyprexa (for mild agitation) at 1357 as indicated by Martines and Broset scores.

## 2025-01-28 NOTE — PROGRESS NOTES
Progress Note - Behavioral Health   Name: Jo-Ann Lamb 54 y.o. female I MRN: [id number]   Unit/Bed#: [facility] 209-02 I Date of Admission: 1/24/2025   Date of Service: 1/28/2025 I Hospital Day: 4         Assessment & Plan  Schizoaffective disorder, bipolar type (HCC)  -Continue Clozaril 350 mg at bedtime, Clozapine level 1,127 on 1/24. Denies side effects besides mild hypersalivation.   -Increase Effexor  mg daily for anxiety and depressive symptoms  -Continue Risperdal 2 mg twice daily  Degenerative disc disease, lumbar    Gastroesophageal reflux disease    Hyperlipidemia    Post-traumatic stress disorder, chronic    Recommended Treatment:     Planned medication and treatment changes:    All current active medications have been reviewed  Encourage group therapy, milieu therapy and occupational therapy  Behavioral Health checks for safety monitoring  Increase Effexor  mg po daily.  Monitor behavior and fall risk.      Current medications:  Current Facility-Administered Medications   Medication Dose Route Frequency Provider Last Rate    acetaminophen  650 mg Oral Q4H PRN Krunal Calzada, CRNP      acetaminophen  650 mg Oral Q4H PRN Krunal Calzada, CRNP      acetaminophen  975 mg Oral Q6H PRN Krunal Calzada, CRNP      albuterol  2 puff Inhalation Q4H PRN Krunal Calzada, RENATANP      atorvastatin  10 mg Oral Daily Krunal Calzada, CRNP      benzonatate  100 mg Oral TID PRN Zandra Barclay PA-C      benztropine  1 mg Intramuscular Q4H PRN Max 6/day Krunal Calzada, CRNP      benztropine  0.5 mg Oral Q4H PRN Max 6/day Krunal Calzada, CRNP      cloZAPine  350 mg Oral HS Jayson Giles MD      vitamin B-12  500 mcg Oral Daily Krunal Calzada, JARED      docusate sodium  100 mg Oral BID Jeison Matias MD      fish oil  1,000 mg Oral Daily Zandra Barclay PA-C      fluticasone  1 spray Nasal Daily Krunal Calzada, JARED      fluticasone-vilanterol  1 puff  Inhalation Daily Jayson Giles MD      folic acid  1 mg Oral Daily Krunal Quan-Anom, CRNP      guaiFENesin  600 mg Oral Q12H KENNY Steel MD      hydrocortisone   Topical 4x Daily PRN Zandra Barclay PA-C      hydrOXYzine HCL  25 mg Oral Q6H PRN Max 4/day Krunal Quan-Anom, CRNP      lactulose  20 g Oral BID PRN Payton Conrad PA-C      lamoTRIgine  100 mg Oral BID Krunal Quan-Anom, CRNP      LORazepam  1 mg Intramuscular Q6H PRN Max 3/day Krunal Quan-Anom, CRNP      LORazepam  0.5 mg Oral Q6H PRN Max 4/day Krunal Quan-Anom, CRNP      LORazepam  1 mg Oral Q6H PRN Max 3/day Krunal Quan-Anom, CRNP      magnesium hydroxide  30 mL Oral Daily PRN Payton Conrad PA-C      methocarbamol  500 mg Oral Q6H PRN Zandra Barclay PA-C      multivitamin-minerals  1 tablet Oral Daily Krunal Quan-Anom, CRNP      nicotine  14 mg Transdermal Daily Jeison Matias MD      nystatin  500,000 Units Swish & Swallow 4x Daily Zandra Barclay PA-C      OLANZapine  5 mg Intramuscular Q3H PRN Max 3/day Krunal Quan-Anom, CRNP      OLANZapine  2.5 mg Oral Q4H PRN Max 6/day Krunal Quan-Anom, CRNP      OLANZapine  5 mg Oral Q4H PRN Max 3/day Krunal Quan-Anom, CRNP      OLANZapine  5 mg Oral Q3H PRN Max 3/day Krunal Quan-Anom, CRNP      oxybutynin  5 mg Oral BID Krunal Quan-Anom, CRNP      pantoprazole  40 mg Oral Daily Before Breakfast Krunal Quan-Anom, CRNP      risperiDONE  2 mg Oral BID Krunal Quan-Anom, CRNP      senna-docusate sodium  1 tablet Oral HS Krunal Quan-Anom, CRNP      thiamine  100 mg Oral Daily Krunal Quan-Anom, CRNP      traZODone  50 mg Oral HS PRN Krunal Quan-Anom, CRNP      venlafaxine  75 mg Oral Daily Jeison Matias MD         Risks / Benefits of Treatment:    Risks, benefits, and possible side effects of medications explained to patient and patient verbalizes understanding and agreement for treatment.    Subjective:    Behavior  over the last 24 hours: limited improvement.     Jo-Ann CUMMINGS seen today for continuing care. Patient states feeling less anxious and depressed but continues with low energy and motivation. States she tolerated Clozaril 350 mg hs with no reported AVH. However, still experiences fleeting SI but feels safe in the unit. She reports her recent auditory hallucinations of voices telling her to hurt herself and visual hallucinations of ghosts and monsters. States she has been taking Ativan PRN which has been effective. She has been medication compliant and denies side effects. Staff reports limited participation in milieu.    Sleep: slept better  Appetite: fair  Medication side effects: No   ROS: no complaints, all other systems are negative    Mental Status Evaluation:    Appearance:  age appropriate, overweight   Behavior:  cooperative, calm   Speech:  delayed, soft   Mood:  depressed, anxious   Affect:  constricted   Thought Process:  decreased rate of thoughts   Associations: concrete associations   Thought Content:  mild paranoia   Perceptual Disturbances: no visual hallucinations, denies auditory hallucinations when asked   Risk Potential: Suicidal ideation - Yes, fleeting suicidal thoughts, contracts for safety on the unit  Homicidal ideation - None at present  Potential for aggression - No   Sensorium:  oriented to person, place, and time/date   Memory:  recent and remote memory grossly intact   Consciousness:  alert and awake   Attention/Concentration: attention span and concentration appear shorter than expected for age   Insight:  limited   Judgment: limited   Gait/Station: normal gait/station   Motor Activity: no abnormal movements     Vital signs in last 24 hours:    Temp:  [97.4 °F (36.3 °C)-98 °F (36.7 °C)] 97.4 °F (36.3 °C)  HR:  [93-96] 95  BP: (123-144)/(78-95) 123/87  Resp:  [18] 18  SpO2:  [94 %-96 %] 94 %  O2 Device: None (Room air)         Laboratory results: I have personally reviewed all pertinent  laboratory/tests results    Most Recent Labs:   Lab Results   Component Value Date    WBC 8.68 01/24/2025    RBC 4.36 01/24/2025    HGB 13.1 01/24/2025    HCT 40.7 01/24/2025     01/24/2025    RDW 13.9 01/24/2025    NEUTROABS 5.20 01/24/2025    SODIUM 133 (L) 01/24/2025    K 3.7 01/24/2025    CL 96 01/24/2025    CO2 26 01/24/2025    BUN 8 01/24/2025    CREATININE 0.82 01/24/2025    GLUC 137 01/24/2025    CALCIUM 8.7 01/24/2025    AST 18 01/24/2025    ALT 17 01/24/2025    ALKPHOS 97 01/24/2025    TP 6.8 01/24/2025    ALB 4.4 01/24/2025    TBILI 0.21 01/24/2025    CHOLESTEROL 235 (H) 01/25/2025    HDL 68 01/25/2025    TRIG 433 (H) 01/25/2025    LDLCALC  01/25/2025      Comment:      Calculated LDL invalid, triglycerides >400 mg/dl  This screening LDL is a calculated result.   It does not have the accuracy of the Direct Measured LDL in the monitoring of patients with hyperlipidemia and/or statin therapy.   Direct Measure LDL ([id number]) must be ordered separately in these patients.    NONHDLC 167 01/25/2025    AMMONIA 28 10/27/2017    UXK3HTEZMHSS 0.790 01/24/2025    FREET4 0.89 03/24/2016    PREGUR Negative 11/07/2022    PREGSERUM Negative 09/26/2024    HCG <2.00 04/10/2014    SYPHILISAB Non-reactive 01/25/2025    HGBA1C 5.6 01/24/2025     01/24/2025       Counseling / Coordination of Care:    Patient's progress discussed with staff in treatment team meeting.  Medication changes reviewed with staff in treatment team meeting.  Cognitive techniques utilized during the session.  Group attendance encouraged.    Jayson Giles MD 01/28/25

## 2025-01-28 NOTE — NURSING NOTE
Patient withdrawn and minimally interactive this evening except to come out for snacks and meals. She continues to endorse high anxiety and depression. Denies SI and denies hallucinations at this time. She is compliant with medications. No complaints of pain. Has not had a BM thus far after receiving PRN Miralax earlier. Able to make needs known.

## 2025-01-29 LAB
ATRIAL RATE: 99 BPM
P AXIS: 70 DEGREES
PR INTERVAL: 162 MS
QRS AXIS: 14 DEGREES
QRSD INTERVAL: 76 MS
QT INTERVAL: 382 MS
QTC INTERVAL: 491 MS
T WAVE AXIS: 64 DEGREES
VENTRICULAR RATE: 99 BPM

## 2025-01-29 PROCEDURE — 93010 ELECTROCARDIOGRAM REPORT: CPT | Performed by: INTERNAL MEDICINE

## 2025-01-29 PROCEDURE — 99232 SBSQ HOSP IP/OBS MODERATE 35: CPT | Performed by: PSYCHIATRY & NEUROLOGY

## 2025-01-29 RX ADMIN — FOLIC ACID 1 MG: 1 TABLET ORAL at 08:18

## 2025-01-29 RX ADMIN — VENLAFAXINE HYDROCHLORIDE 150 MG: 150 CAPSULE, EXTENDED RELEASE ORAL at 08:19

## 2025-01-29 RX ADMIN — OXYBUTYNIN CHLORIDE 5 MG: 5 TABLET ORAL at 17:09

## 2025-01-29 RX ADMIN — LAMOTRIGINE 100 MG: 100 TABLET ORAL at 17:09

## 2025-01-29 RX ADMIN — HYDROXYZINE HYDROCHLORIDE 25 MG: 25 TABLET ORAL at 09:56

## 2025-01-29 RX ADMIN — FLUTICASONE FUROATE AND VILANTEROL TRIFENATATE 1 PUFF: 200; 25 POWDER RESPIRATORY (INHALATION) at 08:20

## 2025-01-29 RX ADMIN — NYSTATIN 500000 UNITS: 100000 SUSPENSION ORAL at 11:55

## 2025-01-29 RX ADMIN — DOCUSATE SODIUM 100 MG: 100 CAPSULE, LIQUID FILLED ORAL at 08:19

## 2025-01-29 RX ADMIN — NYSTATIN 500000 UNITS: 100000 SUSPENSION ORAL at 17:10

## 2025-01-29 RX ADMIN — DOCUSATE SODIUM 100 MG: 100 CAPSULE, LIQUID FILLED ORAL at 17:09

## 2025-01-29 RX ADMIN — ATORVASTATIN CALCIUM 10 MG: 10 TABLET, FILM COATED ORAL at 08:20

## 2025-01-29 RX ADMIN — NICOTINE 14 MG: 14 PATCH, EXTENDED RELEASE TRANSDERMAL at 08:18

## 2025-01-29 RX ADMIN — ACETAMINOPHEN 650 MG: 325 TABLET ORAL at 14:56

## 2025-01-29 RX ADMIN — OLANZAPINE 5 MG: 5 TABLET, FILM COATED ORAL at 09:56

## 2025-01-29 RX ADMIN — PANTOPRAZOLE SODIUM 40 MG: 40 TABLET, DELAYED RELEASE ORAL at 06:12

## 2025-01-29 RX ADMIN — LAMOTRIGINE 100 MG: 100 TABLET ORAL at 08:19

## 2025-01-29 RX ADMIN — THIAMINE HCL TAB 100 MG 100 MG: 100 TAB at 08:18

## 2025-01-29 RX ADMIN — GUAIFENESIN 600 MG: 600 TABLET ORAL at 21:15

## 2025-01-29 RX ADMIN — RISPERIDONE 2 MG: 2 TABLET, FILM COATED ORAL at 08:18

## 2025-01-29 RX ADMIN — NYSTATIN 500000 UNITS: 100000 SUSPENSION ORAL at 21:15

## 2025-01-29 RX ADMIN — OXYBUTYNIN CHLORIDE 5 MG: 5 TABLET ORAL at 08:18

## 2025-01-29 RX ADMIN — Medication 1 TABLET: at 08:18

## 2025-01-29 RX ADMIN — OMEGA-3 FATTY ACIDS CAP 1000 MG 1000 MG: 1000 CAP at 08:19

## 2025-01-29 RX ADMIN — LORAZEPAM 0.5 MG: 0.5 TABLET ORAL at 14:57

## 2025-01-29 RX ADMIN — CYANOCOBALAMIN TAB 500 MCG 500 MCG: 500 TAB at 08:19

## 2025-01-29 RX ADMIN — RISPERIDONE 2 MG: 2 TABLET, FILM COATED ORAL at 17:09

## 2025-01-29 RX ADMIN — CLOZAPINE 350 MG: 100 TABLET ORAL at 21:15

## 2025-01-29 RX ADMIN — GUAIFENESIN 600 MG: 600 TABLET ORAL at 08:19

## 2025-01-29 RX ADMIN — NYSTATIN 500000 UNITS: 100000 SUSPENSION ORAL at 08:20

## 2025-01-29 RX ADMIN — SENNOSIDES AND DOCUSATE SODIUM 1 TABLET: 50; 8.6 TABLET ORAL at 21:15

## 2025-01-29 RX ADMIN — FLUTICASONE PROPIONATE 1 SPRAY: 50 SPRAY, METERED NASAL at 08:21

## 2025-01-29 NOTE — TREATMENT TEAM
01/28/25 2121   Martines Anxiety Scale   Anxious Mood 3   Tension 2   Fears 3   Insomnia 3   Intellectual 3   Depressed Mood 3   Somatic Complaints: Muscular 0   Somatic Complaints: Sensory 0   Cardiovascular Symptoms 0   Respiratory Symptoms 0   Gastrointestinal Symptoms 0   Genitourinary Symptoms 0   Autonomic Symptoms 0   Behavior at Interview 0   Martines Anxiety Score 17     25mg Atarax administered at 2121 for mild anxiety as indicated by Martines score.

## 2025-01-29 NOTE — PROGRESS NOTES
01/29/25   Team Meeting   Meeting Type Daily Rounds   Team Members Present   Team Members Present Physician;Nurse;;Occupational Therapist   Physician Team Member Tisha COLEMAN   Nursing Team Member David JIMENES   Social Work Team Member Florian BURROUGHS   OT Team Member Peter CTRS   Patient/Family Present   Patient Present No   Patient's Family Present No   201,[facility] visit on unit yesterday, return to [facility], CWA, multiple PRN, denies all, withdrawn to self, slept, bm yesterday,d/c Fri

## 2025-01-29 NOTE — NURSING NOTE
"Patient approached nurse stating she is feeling \"hot\" like she has a fever. Temperature 96.8, skin pink, warm, dry. C/O productive cough for green sputum. Lungs clear upon auscultation, denies SOB/chest pain. Patient also stated she has \"pressure\" behind her eyes. Expressed moderate anxiety, requested PRN Ativan. Unable to identify cause of anxiety. PRN Tylenol and PRN Ativan 0.5 mg PO given for Martines score of 20. Remains on 15\" checks for safety and behaviors.  "

## 2025-01-29 NOTE — PLAN OF CARE
Problem: Ineffective Coping  Goal: Participates in unit activities  Description: Interventions:  - Provide therapeutic environment   - Provide required programming   - Redirect inappropriate behaviors   Outcome: Not Progressing   Patient has been withdrawn to room, will encourage daily attendance to groups.

## 2025-01-29 NOTE — PROGRESS NOTES
Progress Note - Behavioral Health   Name: Jo-Ann Lamb 54 y.o. female I MRN: [id number]   Unit/Bed#: [facility] 209-02 I Date of Admission: 1/24/2025   Date of Service: 1/29/2025 I Hospital Day: 5         Assessment & Plan  Schizoaffective disorder, bipolar type (HCC)  -Continue Clozaril 350 mg at bedtime.  -Continues Effexor  mg daily for anxiety and depressive symptoms  -Continue Risperdal 2 mg twice daily  Degenerative disc disease, lumbar    Gastroesophageal reflux disease    Hyperlipidemia    Post-traumatic stress disorder, chronic    Recommended Treatment:     Planned medication and treatment changes:    All current active medications have been reviewed  Encourage group therapy, milieu therapy and occupational therapy  Behavioral Health checks for safety monitoring  Requires continued inpatient treatment due to chronic illness and high risk of decompensation if discharged before long term stability is achieved  Monitor behavior and fall risk.      Current medications:  Current Facility-Administered Medications   Medication Dose Route Frequency Provider Last Rate    acetaminophen  650 mg Oral Q4H PRN Krunal Calzada, CRNP      acetaminophen  650 mg Oral Q4H PRN Krunal Calzada, CRNP      acetaminophen  975 mg Oral Q6H PRN Krunal Calzada, JARED      albuterol  2 puff Inhalation Q4H PRN Krunal Calzada, JARED      atorvastatin  10 mg Oral Daily Krunal Calzada, RENATANP      benzonatate  100 mg Oral TID PRN Zandra Barclay PA-C      benztropine  1 mg Intramuscular Q4H PRN Max 6/day Krunal Calzada, RENATANP      benztropine  0.5 mg Oral Q4H PRN Max 6/day Krunal Calzada, RENATANP      cloZAPine  350 mg Oral HS Jayson Giles MD      vitamin B-12  500 mcg Oral Daily JARED Carson      docusate sodium  100 mg Oral BID Jeison Matias MD      fish oil  1,000 mg Oral Daily Zandra Barclay PA-C      fluticasone  1 spray Nasal Daily JARED Carson       fluticasone-vilanterol  1 puff Inhalation Daily Jayson Giles MD      folic acid  1 mg Oral Daily Krunal Quan-Anom, CRNP      guaiFENesin  600 mg Oral Q12H KENNY Steel MD      hydrocortisone   Topical 4x Daily PRN Zandra Barclay PA-C      hydrOXYzine HCL  25 mg Oral Q6H PRN Max 4/day Krunal Quan-Anom, CRNP      lactulose  20 g Oral BID PRN Payton Conrad PA-C      lamoTRIgine  100 mg Oral BID Krunal Quan-Anom, CRNP      LORazepam  1 mg Intramuscular Q6H PRN Max 3/day Krunal Quan-Anom, CRNP      LORazepam  0.5 mg Oral Q6H PRN Max 4/day Krunal Quan-Anom, CRNP      LORazepam  1 mg Oral Q6H PRN Max 3/day Krunal Quan-Anom, CRNP      magnesium hydroxide  30 mL Oral Daily PRN Payton Conrad PA-C      methocarbamol  500 mg Oral Q6H PRN Zandra Barclay PA-C      multivitamin-minerals  1 tablet Oral Daily Krunal Quan-Anom, CRNP      nicotine  14 mg Transdermal Daily Jeison Matias MD      nystatin  500,000 Units Swish & Swallow 4x Daily Zandra Barclay PA-C      OLANZapine  5 mg Intramuscular Q3H PRN Max 3/day Krunal Quan-Anom, CRNP      OLANZapine  2.5 mg Oral Q4H PRN Max 6/day Krunal Quan-Anom, CRNP      OLANZapine  5 mg Oral Q4H PRN Max 3/day Krunal Quan-Anom, CRNP      OLANZapine  5 mg Oral Q3H PRN Max 3/day Krunal Quan-Anom, CRNP      oxybutynin  5 mg Oral BID Krunal Quan-Anom, CRNP      pantoprazole  40 mg Oral Daily Before Breakfast Krunal Quan-Anom, CRNP      risperiDONE  2 mg Oral BID Krunal Quan-Anom, CRNP      senna-docusate sodium  1 tablet Oral HS Krunal Quan-Anom, CRNP      thiamine  100 mg Oral Daily Krunal Quan-Anom, CRNP      traZODone  50 mg Oral HS PRN Krunal Quan-Anom, CRNP      venlafaxine  150 mg Oral Daily Tyler H Tisha, MD         Risks / Benefits of Treatment:    Risks, benefits, and possible side effects of medications explained to patient and patient verbalizes understanding and agreement for  treatment.    Subjective:    Behavior over the last 24 hours: slowly improving.     Jo-Ann CUMMINGS seen today for continuing care. Patient states feeling less anxious on and off, requiring to take prn Atarax and Zyprexa periodically. She remains with limited  motivation and participation in groups. Denies current SI but admits occasional auditory hallucinations of voices telling her to hurt herself, but she feels safe in the unit. Patient states that she has an ACT team support and has been adherence to her outpt tx. She has been compliant with meds and denies side effects. Staff reports limited participation in milieu.    Sleep: improving  Appetite: increased  Medication side effects: No   ROS: no complaints, all other systems are negative    Mental Status Evaluation:    Appearance:  age appropriate, overweight   Behavior:  cooperative, calm   Speech:  decreased rate, delayed   Mood:  anxious   Affect:  constricted   Thought Process:  decreased rate of thoughts   Associations: concrete associations   Thought Content:  no overt delusions   Perceptual Disturbances: denies auditory hallucinations when asked   Risk Potential: Suicidal ideation - None at present  Homicidal ideation - None at present  Potential for aggression - No   Sensorium:  oriented to person, place, and time/date   Memory:  recent and remote memory grossly intact   Consciousness:  alert and awake   Attention/Concentration: attention span and concentration appear shorter than expected for age   Insight:  limited   Judgment: limited   Gait/Station: normal gait/station   Motor Activity: no abnormal movements     Vital signs in last 24 hours:    Temp:  [97.6 °F (36.4 °C)-97.7 °F (36.5 °C)] 97.7 °F (36.5 °C)  HR:  [80-93] 80  BP: (109-140)/(73-88) 109/73  Resp:  [16-18] 16  SpO2:  [90 %-92 %] 90 %  O2 Device: None (Room air)         Laboratory results: I have personally reviewed all pertinent laboratory/tests results    Most Recent Labs:   Lab Results    Component Value Date    WBC 8.68 01/24/2025    RBC 4.36 01/24/2025    HGB 13.1 01/24/2025    HCT 40.7 01/24/2025     01/24/2025    RDW 13.9 01/24/2025    NEUTROABS 5.20 01/24/2025    SODIUM 133 (L) 01/24/2025    K 3.7 01/24/2025    CL 96 01/24/2025    CO2 26 01/24/2025    BUN 8 01/24/2025    CREATININE 0.82 01/24/2025    GLUC 137 01/24/2025    CALCIUM 8.7 01/24/2025    AST 18 01/24/2025    ALT 17 01/24/2025    ALKPHOS 97 01/24/2025    TP 6.8 01/24/2025    ALB 4.4 01/24/2025    TBILI 0.21 01/24/2025    CHOLESTEROL 235 (H) 01/25/2025    HDL 68 01/25/2025    TRIG 433 (H) 01/25/2025    LDLCALC  01/25/2025      Comment:      Calculated LDL invalid, triglycerides >400 mg/dl  This screening LDL is a calculated result.   It does not have the accuracy of the Direct Measured LDL in the monitoring of patients with hyperlipidemia and/or statin therapy.   Direct Measure LDL (RBJ825) must be ordered separately in these patients.    NONHDLC 167 01/25/2025    AMMONIA 28 10/27/2017    [id number] 0.790 01/24/2025    FREET4 0.89 03/24/2016    PREGUR Negative 11/07/2022    PREGSERUM Negative 09/26/2024    HCG <2.00 04/10/2014    SYPHILISAB Non-reactive 01/25/2025    HGBA1C 5.6 01/24/2025     01/24/2025       Counseling / Coordination of Care:    Patient's progress discussed with staff in treatment team meeting.  Medication changes reviewed with staff in treatment team meeting.  Cognitive techniques utilized during the session.  Group attendance encouraged.    Jayson Giles MD 01/29/25

## 2025-01-29 NOTE — NURSING NOTE
"Patient S/P PRN Ativan and Tylenol. Patient stated she obtained relief from pain and anxiety. Currently out of bed in dining room, eating crackers and drinking ginger ale. No noted distress. Remains on 15\" checks for safety and behaviors.  "

## 2025-01-29 NOTE — NURSING NOTE
Patient appears to have slept the majority of the overnight hours. No concerns voiced, no signs of distress. Continuous 15 min safety checks in place.

## 2025-01-29 NOTE — NURSING NOTE
"Patient visible in milieu for breakfast then returned to room to rest in bed. Withdrawn to self, offers minimal conversation with staff and peers. Affect flat. Patient denies anxiety/depression, SI/HI, hallucinations. Remains medication compliant and on 15\" checks for safety and behaviors.  "

## 2025-01-29 NOTE — NURSING NOTE
Upon reassessment, patient resting in bed with eyes closed. Appears comfortable. Medication effective.

## 2025-01-29 NOTE — ASSESSMENT & PLAN NOTE
-Continue Clozaril 350 mg at bedtime.  -Continues Effexor  mg daily for anxiety and depressive symptoms  -Continue Risperdal 2 mg twice daily

## 2025-01-29 NOTE — TREATMENT TEAM
01/28/25 2100   Stool Output/Assessment   Unmeasured Stool Occurrence 1   Last BM Date 01/28/25     Patient states she had a BM. PRN laxatives successful.

## 2025-01-29 NOTE — PROGRESS NOTES
Progress Note - Jo-Ann Lamb 54 y.o. female MRN: [id number]    Unit/Bed#: [facility] 209-02 Encounter: [id number]        Subjective:   Patient seen and examined at bedside after reviewing the chart and discussing the case with the caring staff.      Patient examined at bedside.  Patient states she had a bowel movement yesterday.  She complains of having hot flashes but otherwise no acute symptoms.     Physical Exam   Vitals: Blood pressure 109/73, pulse 80, temperature 97.7 °F (36.5 °C), temperature source Temporal, resp. rate 16, height 5' (1.524 m), weight 70.3 kg (155 lb), last menstrual period 01/01/2020, SpO2 90%, not currently breastfeeding.,Body mass index is 30.27 kg/m².  Constitutional: Patient in no acute distress.  HEENT: PERR, EOMI, MMM.  Cardiovascular: Normal rate and regular rhythm.    Pulmonary/Chest: Effort normal and breath sounds normal.   Abdomen: Soft, + BS, NT.    Assessment/Plan:  Jo-Ann Lamb is a(n) 54 y.o. female with schizoaffective disorder, bipolar type.      Asthma.  Patient is on Breo Ellipta daily (Advair nonform), albuterol inhaler as needed.   Allergic rhinitis.  Continue Flonase nasal spray daily.   Hyperlipidemia.  Continue atorvastatin 10 mg daily.  Add fish oil daily 1/25.  GERD.  Patient on Protonix 40 mg daily.   Urge incontinence.  Continue oxybutynin 5 mg twice daily.   Constipation/hemorrhoids.  Continue colace twice daily, Senokot S at bedtime.  Apply Anusol-HC as needed.  MOM and lactulose as needed.  Tobacco abuse.  NRT.   Alcohol abuse.  Patient started on thiamine, folic acid, MVI.   Vitamin D deficiency.  Continue vitamin D3 1000 units daily.  Vitamin B12 deficiency.  Continue B12 500 mcg daily.    Chronic lower back pain.  Tylenol or Robaxin as needed.   Thrush.  Nystatin swish and swallow 4x daily x 7 days.   Cough/congestion.  Pt started on Mucinex 600 mg twice daily 1/27/25.    The patient was discussed with Dr. Steel and he is in agreement with the above note.

## 2025-01-29 NOTE — NURSING NOTE
"Patient S/P PRN Atarax and Zyprexa. Remains withdrawn to room, affect depressed/flat. Patient with decreased anxiety and aggression however continues to endorse depression. Per patient, \"I don't think I will be ready to go on Friday\", support provided. Remains on 15\" checks for safety and behaviors.  "

## 2025-01-29 NOTE — NURSING NOTE
"Patient approached nurse stating that she now feels anxious/depressed and agitated. Irritable edge noted. Withdrawn to self, appears restless, affect depressed/flat. Patient unable to state what made her suddenly anxious/depressed and agitated. Requested PRN medication. PRN Atarax given for Martines score of 8 along with PRN Zyprexa 5 mg PO for agitation. Patient does not think she is ready for discharge on Friday, MD aware. Remains on 15\" checks for safety and behaviors.  "

## 2025-01-30 PROCEDURE — 99232 SBSQ HOSP IP/OBS MODERATE 35: CPT | Performed by: PSYCHIATRY & NEUROLOGY

## 2025-01-30 RX ORDER — CLOZAPINE 100 MG/1
350 TABLET ORAL
Qty: 105 TABLET | Refills: 0 | Status: SHIPPED | OUTPATIENT
Start: 2025-01-30

## 2025-01-30 RX ORDER — VENLAFAXINE HYDROCHLORIDE 150 MG/1
150 CAPSULE, EXTENDED RELEASE ORAL DAILY
Qty: 30 CAPSULE | Refills: 0 | Status: SHIPPED | OUTPATIENT
Start: 2025-01-31

## 2025-01-30 RX ADMIN — NYSTATIN 500000 UNITS: 100000 SUSPENSION ORAL at 17:06

## 2025-01-30 RX ADMIN — DOCUSATE SODIUM 100 MG: 100 CAPSULE, LIQUID FILLED ORAL at 17:05

## 2025-01-30 RX ADMIN — PANTOPRAZOLE SODIUM 40 MG: 40 TABLET, DELAYED RELEASE ORAL at 06:05

## 2025-01-30 RX ADMIN — FLUTICASONE PROPIONATE 1 SPRAY: 50 SPRAY, METERED NASAL at 08:26

## 2025-01-30 RX ADMIN — SENNOSIDES AND DOCUSATE SODIUM 1 TABLET: 50; 8.6 TABLET ORAL at 20:50

## 2025-01-30 RX ADMIN — METHOCARBAMOL 500 MG: 500 TABLET ORAL at 13:44

## 2025-01-30 RX ADMIN — OXYBUTYNIN CHLORIDE 5 MG: 5 TABLET ORAL at 08:25

## 2025-01-30 RX ADMIN — NYSTATIN 500000 UNITS: 100000 SUSPENSION ORAL at 08:26

## 2025-01-30 RX ADMIN — OMEGA-3 FATTY ACIDS CAP 1000 MG 1000 MG: 1000 CAP at 08:25

## 2025-01-30 RX ADMIN — RISPERIDONE 2 MG: 2 TABLET, FILM COATED ORAL at 08:25

## 2025-01-30 RX ADMIN — RISPERIDONE 2 MG: 2 TABLET, FILM COATED ORAL at 17:06

## 2025-01-30 RX ADMIN — THIAMINE HCL TAB 100 MG 100 MG: 100 TAB at 08:25

## 2025-01-30 RX ADMIN — ACETAMINOPHEN 975 MG: 325 TABLET ORAL at 13:44

## 2025-01-30 RX ADMIN — ATORVASTATIN CALCIUM 10 MG: 10 TABLET, FILM COATED ORAL at 08:25

## 2025-01-30 RX ADMIN — OXYBUTYNIN CHLORIDE 5 MG: 5 TABLET ORAL at 17:05

## 2025-01-30 RX ADMIN — CYANOCOBALAMIN TAB 500 MCG 500 MCG: 500 TAB at 08:24

## 2025-01-30 RX ADMIN — GUAIFENESIN 600 MG: 600 TABLET ORAL at 08:24

## 2025-01-30 RX ADMIN — NYSTATIN 500000 UNITS: 100000 SUSPENSION ORAL at 20:49

## 2025-01-30 RX ADMIN — GUAIFENESIN 600 MG: 600 TABLET ORAL at 20:50

## 2025-01-30 RX ADMIN — LAMOTRIGINE 100 MG: 100 TABLET ORAL at 08:25

## 2025-01-30 RX ADMIN — LAMOTRIGINE 100 MG: 100 TABLET ORAL at 17:05

## 2025-01-30 RX ADMIN — MAGNESIUM HYDROXIDE 30 ML: 400 SUSPENSION ORAL at 12:27

## 2025-01-30 RX ADMIN — FOLIC ACID 1 MG: 1 TABLET ORAL at 08:25

## 2025-01-30 RX ADMIN — LORAZEPAM 0.5 MG: 0.5 TABLET ORAL at 12:02

## 2025-01-30 RX ADMIN — FLUTICASONE FUROATE AND VILANTEROL TRIFENATATE 1 PUFF: 200; 25 POWDER RESPIRATORY (INHALATION) at 08:26

## 2025-01-30 RX ADMIN — DOCUSATE SODIUM 100 MG: 100 CAPSULE, LIQUID FILLED ORAL at 08:24

## 2025-01-30 RX ADMIN — LACTULOSE 20 G: 20 SOLUTION ORAL at 17:08

## 2025-01-30 RX ADMIN — VENLAFAXINE HYDROCHLORIDE 150 MG: 150 CAPSULE, EXTENDED RELEASE ORAL at 08:25

## 2025-01-30 RX ADMIN — CLOZAPINE 350 MG: 100 TABLET ORAL at 20:49

## 2025-01-30 RX ADMIN — NICOTINE 14 MG: 14 PATCH, EXTENDED RELEASE TRANSDERMAL at 08:24

## 2025-01-30 RX ADMIN — NYSTATIN 500000 UNITS: 100000 SUSPENSION ORAL at 12:00

## 2025-01-30 RX ADMIN — HYDROXYZINE HYDROCHLORIDE 25 MG: 25 TABLET ORAL at 20:50

## 2025-01-30 RX ADMIN — Medication 1 TABLET: at 08:24

## 2025-01-30 NOTE — PROGRESS NOTES
01/30/25 1554    Discharge Notification   Notification of Discharge Provided to: ACT Team;Residence;Family   ACT Team Notified via: Phone call;Fax   Family Notified via: Phone call   Residence Notified via: Phone call;Fax       CM left voice mail for pt sister Nancy [phone] notifying of pt scheduled d/c.    OLIVE spoke to pt nurse Dandre from [facility] [phone] to review d/c plan and supports. Dandre in agreement. Psych meds to be sent to [place]. Pt manages her own medical meds in which she will pick them up at [facility]

## 2025-01-30 NOTE — PROGRESS NOTES
01/30/25    Team Meeting   Meeting Type Daily Rounds   Team Members Present   Team Members Present Physician;Nurse;Occupational Therapist;   Physician Team Member Tisha COLEMAN   Nursing Team Member David RN   Social Work Team Member Florian BURROUGHS   OT Team Member Peter CTRS   Patient/Family Present   Patient Present No   Patient's Family Present No   201, dep, liable, baseline, multiple PRN's, Ativan, flat/dep, denies si/hi/avh, d/c tato home to [facility]

## 2025-01-30 NOTE — DISCHARGE INSTR - OTHER ORDERS
You are being discharged to your home at [address] [facility] [address] TELEPHONE [phone]     Triggers you have identified during your hospitalization that led to your admission of a regressed mood include ineffective coping skills. Coping skills you have identified during your hospitalization include meditation, listening to music, and john chi. If you are unable to deal with your distressed mood alone please tell contact someone from your ACT team [phone] and ask for assistance. If that is not effective and you continue to have (ex: suicidal ideation, homicidal ideation, distressed mood, overwhelmed, in crisis) please contact Crisis by dialing [phone], call [facility] 1 [phone], dial [phone] or go to the nearest emergency center.      *[facility] Crisis Hotline: 617.334.8660  *[place] Suicide Prevention Lifeline:  1-515.130.8609  *Alcohol Anonymous: 729.637.1291  *[facility] Drug & Alcohol Commission: (160) 541-1494  *National [place] on Mental Illness (MIKE) HELPLINE: 360.261.7452/Website: www.mike.org  *Substance Abuse and Mental Health Services Administration([facility]) National Helpline, which is a confidential, free, 24-hour-a-day, 365-day-a-year, information service for individuals and family members facing mental health and/or substance use disorders. This service provides referrals to local treatment facilities, support groups, and community-based organizations. Callers can also order free publications and other information.  Call 1-309.414.6702/Website: www.[facility].gov  *[facility] 2-1-1: This is a toll free, confidential, 24-hour-a-day service which connects you to a community  in your area who can help you find services and resources that are available to you locally and provide critical services that can improve and save lives.  Call: 211  /Website: http://www.ezTaxiorg/       Demetra, or Sandhya, our Behavioral Health Nurse Navigators, will be calling  you after your discharge, on the phone number that you provided.  They will be available as an additional support, if needed.   If you wish to speak with one of them, you may contact Demetra at [phone] or Sandhya at [phone]    Outpatient Drug & Alcohol Treatment   The Critical access hospital currently operates an outpatient treatment unit:  [facility] in [place], [place] as a functional unit of the [facility]  The Outpatient treatment units are licensed by the [place] Department of Drug & Alcohol Programs to provide individual and group counseling for those with substance abuse and dependency problems. The clinical staff is experienced in a variety of therapeutic modalities and provides treatment that is individualized to meet the particular needs of each person. These units are drug-free treatment programs.  The Critical access hospital accepts most major healthcare insurance coverage plans, [place] Medical Assistance and in those cases where the consumer has no third party healthcare coverage, a liberal sliding fee schedule is utilized. The length of service and type of outpatient service provided is based on the results of the Level of Care Assessment            There are currently three treatment protocols available:  Outpatient  Intensive Outpatient  Contracted services for Partial Hospitalization  Therapy is provided in both Individual and Group counseling formats. The Outpatient department offers individual counseling for the family members of substance abusers to address co-dependent and enabling behaviors.    Outpatient treatment services in [facility] are purchased through a fee-for-service subcontract with:  [facility]  [address] [place] [address]   Phone: [phone]    [facility] Outpatient  [address]  [place] [address]   New Admissions [phone]  Local office [phone]  Walk in hours M/W 9am-1pm Tues 11am-5pm and Thurs  2pm-6pm    Outpatient treatment services in [facility] are purchased through fee-for-service subcontracts with:  [facility]  [address]  [place], [place] [address]  Phone: [phone]    [facility] Out[facility]  [address]  [place] [address]  Phone: New Admissions [phone] / Local Office [phone]  Outpatient treatment services are also available to [facility] residents in our [facility] Office.

## 2025-01-30 NOTE — NURSING NOTE
"Patient visible in milieu for breakfast then returned to room to rest in bed. Withdrawn to self. Pleasant and cooperative in interaction. Social with staff, limited interaction with peers. Affect flat. Patient denied anxiety at this time, endorsed depression. Patient does acknowledge that her anxiety/depression fluctuates throughout the day. Denies SI/HI, hallucinations. C/O feeling tired which patient attributes to being depressed. Patient currently meeting with ACT Team member. Remains medication compliant and on 15\" checks for safety and behaviors.  "

## 2025-01-30 NOTE — PLAN OF CARE
Problem: Alteration in Thoughts and Perception  Goal: Verbalize thoughts and feelings  Description: Interventions:  - Promote a nonjudgmental and trusting relationship with the patient through active listening and therapeutic communication  - Assess patient's level of functioning, behavior and potential for risk  - Engage patient in 1 on 1 interactions  - Encourage patient to express fears, feelings, frustrations, and discuss symptoms    - [place] patient to reality, help patient recognize reality-based thinking   - Administer medications as ordered and assess for potential side effects  - Provide the patient education related to the signs and symptoms of the illness and desired effects of prescribed medications  Outcome: Progressing     Problem: Risk for Self Injury/Neglect  Goal: Refrain from harming self  Description: Interventions:  - Monitor patient closely, per order  - Develop a trusting relationship  - Supervise medication ingestion, monitor effects and side effects   Outcome: Progressing     Problem: Depression  Goal: Verbalize thoughts and feelings  Description: Interventions:  - Assess and re-assess patient's level of risk   - Engage patient in 1:1 interactions, daily, for a minimum of 15 minutes   - Encourage patient to express feelings, fears, frustrations, hopes   Outcome: Progressing     Problem: Anxiety  Goal: Anxiety is at manageable level  Description: Interventions:  - Assess and monitor patient's anxiety level.   - Monitor for signs and symptoms (heart palpitations, chest pain, shortness of breath, headaches, nausea, feeling jumpy, restlessness, irritable, apprehensive).   - Collaborate with interdisciplinary team and initiate plan and interventions as ordered.  - [place] patient to unit/surroundings  - Explain treatment plan  - Encourage participation in care  - Encourage verbalization of concerns/fears  - Identify coping mechanisms  - Assist in developing anxiety-reducing skills  -  Administer/offer alternative therapies  - Limit or eliminate stimulants  Outcome: Progressing

## 2025-01-30 NOTE — PROGRESS NOTES
Progress Note - Behavioral Health   Name: Jo-Ann Lamb 54 y.o. female I MRN: [id number]   Unit/Bed#: [facility] 209-02 I Date of Admission: 1/24/2025   Date of Service: 1/30/2025 I Hospital Day: 6         Assessment & Plan  Schizoaffective disorder, bipolar type (HCC)  -Continue Clozaril 350 mg at bedtime.  -Continues Effexor  mg daily for anxiety and depressive symptoms  -Continue Risperdal 2 mg twice daily  Degenerative disc disease, lumbar    Gastroesophageal reflux disease    Hyperlipidemia    Post-traumatic stress disorder, chronic    Recommended Treatment:     Planned medication and treatment changes:    All current active medications have been reviewed  Encourage group therapy, milieu therapy and occupational therapy  Behavioral Health checks for safety monitoring  Discharge planned for tomorrow  Monitor behavior and fall risk.      Current medications:  Current Facility-Administered Medications   Medication Dose Route Frequency Provider Last Rate    acetaminophen  650 mg Oral Q4H PRN Krunal Calzada, CRNP      acetaminophen  650 mg Oral Q4H PRN Krunal Calzada, CRNP      acetaminophen  975 mg Oral Q6H PRN Krunal Calzada, CRNP      albuterol  2 puff Inhalation Q4H PRN Krunal Calzada, CRNP      atorvastatin  10 mg Oral Daily Krunal Calzada, CRNP      benzonatate  100 mg Oral TID PRN Zandra Barclay PA-C      benztropine  1 mg Intramuscular Q4H PRN Max 6/day Krunal Calzada, CRNP      benztropine  0.5 mg Oral Q4H PRN Max 6/day Krunal Calzada, CRNP      cloZAPine  350 mg Oral HS Jayson Giles MD      vitamin B-12  500 mcg Oral Daily Krunal Calzada, JARED      docusate sodium  100 mg Oral BID Jeison Matias MD      fish oil  1,000 mg Oral Daily Zandra Barclay PA-C      fluticasone  1 spray Nasal Daily Krunal Calzada, JARED      fluticasone-vilanterol  1 puff Inhalation Daily Jayson Giles MD      folic acid  1 mg Oral Daily Krunal Calzada, JARED       guaiFENesin  600 mg Oral Q12H KENNY Steel MD      hydrocortisone   Topical 4x Daily PRN Zandra Barclay PA-C      hydrOXYzine HCL  25 mg Oral Q6H PRN Max 4/day Krunal Quan-Anom, CRNP      lactulose  20 g Oral BID PRN Payton Conrad PA-C      lamoTRIgine  100 mg Oral BID Krunal Quan-Anom, CRNP      LORazepam  1 mg Intramuscular Q6H PRN Max 3/day Krunal Quan-Anom, CRNP      LORazepam  0.5 mg Oral Q6H PRN Max 4/day Krunal Quan-Anom, CRNP      LORazepam  1 mg Oral Q6H PRN Max 3/day Krunal Quan-Anom, CRNP      magnesium hydroxide  30 mL Oral Daily PRN Payton Conrad PA-C      methocarbamol  500 mg Oral Q6H PRN Zandra Barclay PA-C      multivitamin-minerals  1 tablet Oral Daily Krunal Quan-Anom, CRNP      nicotine  14 mg Transdermal Daily Jeison Matias MD      nystatin  500,000 Units Swish & Swallow 4x Daily Zandra Barclay PA-C      OLANZapine  5 mg Intramuscular Q3H PRN Max 3/day Krunal Quan-Anom, CRNP      OLANZapine  2.5 mg Oral Q4H PRN Max 6/day Krunal Quan-Anom, CRNP      OLANZapine  5 mg Oral Q4H PRN Max 3/day Krunal Quan-Anom, CRNP      OLANZapine  5 mg Oral Q3H PRN Max 3/day Krunal Quan-Anom, CRNP      oxybutynin  5 mg Oral BID Krunal Quan-Anom, CRNP      pantoprazole  40 mg Oral Daily Before Breakfast Krunal Quan-Anom, CRNP      risperiDONE  2 mg Oral BID Krunal Quan-Anom, CRNP      senna-docusate sodium  1 tablet Oral HS Krunal Quan-Anom, CRNP      thiamine  100 mg Oral Daily Krunal Quan-Anom, CRNP      traZODone  50 mg Oral HS PRN Krunal Quan-Anom, CRNP      venlafaxine  150 mg Oral Daily Jayson Giles MD         Risks / Benefits of Treatment:    Risks, benefits, and possible side effects of medications explained to patient and patient verbalizes understanding and agreement for treatment.    Subjective:    Behavior over the last 24 hours: improving.     Jo-Ann CUMMINGS seen today for continuing care. Patient reports doing better with  improved sleep and energy level. States she meet with her ACT team and feels safe to return home. Denies current SI or Hallucinations consistently. Pt states she has been working with her ACT team staff who support her and monitor her meds. She has been compliant with meds and denies side effects. Med education and discharge planning were discussed. Patient verbalized understanding and agreement with the plan.    Sleep: improving  Appetite: increased  Medication side effects: No   ROS: no complaints, all other systems are negative    Mental Status Evaluation:    Appearance:  age appropriate, overweight   Behavior:  cooperative, calm   Speech:  normal rate and volume   Mood:  anxious   Affect:  constricted   Thought Process:  decreased rate of thoughts   Associations: concrete associations   Thought Content:  no overt delusions   Perceptual Disturbances: denies auditory hallucinations when asked   Risk Potential: Suicidal ideation - None at present  Homicidal ideation - None at present  Potential for aggression - No   Sensorium:  oriented to person, place, and time/date   Memory:  recent and remote memory grossly intact   Consciousness:  alert and awake   Attention/Concentration: attention span and concentration appear shorter than expected for age   Insight:  fair   Judgment: fair   Gait/Station: normal gait/station   Motor Activity: no abnormal movements     Vital signs in last 24 hours:    Temp:  [97.3 °F (36.3 °C)-97.8 °F (36.6 °C)] 97.4 °F (36.3 °C)  HR:  [55-72] 72  BP: ()/(72-85) 99/72  Resp:  [18] 18  SpO2:  [90 %-100 %] 100 %  O2 Device: None (Room air)         Laboratory results: I have personally reviewed all pertinent laboratory/tests results    Most Recent Labs:   Lab Results   Component Value Date    WBC 8.68 01/24/2025    RBC 4.36 01/24/2025    HGB 13.1 01/24/2025    HCT 40.7 01/24/2025     01/24/2025    RDW 13.9 01/24/2025    NEUTROABS 5.20 01/24/2025    SODIUM 133 (L) 01/24/2025    K 3.7  01/24/2025    CL 96 01/24/2025    CO2 26 01/24/2025    BUN 8 01/24/2025    CREATININE 0.82 01/24/2025    GLUC 137 01/24/2025    CALCIUM 8.7 01/24/2025    AST 18 01/24/2025    ALT 17 01/24/2025    ALKPHOS 97 01/24/2025    TP 6.8 01/24/2025    ALB 4.4 01/24/2025    TBILI 0.21 01/24/2025    CHOLESTEROL 235 (H) 01/25/2025    HDL 68 01/25/2025    TRIG 433 (H) 01/25/2025    LDLCALC  01/25/2025      Comment:      Calculated LDL invalid, triglycerides >400 mg/dl  This screening LDL is a calculated result.   It does not have the accuracy of the Direct Measured LDL in the monitoring of patients with hyperlipidemia and/or statin therapy.   Direct Measure LDL (ENX930) must be ordered separately in these patients.    NONHDLC 167 01/25/2025    AMMONIA 28 10/27/2017    RUY9CBLVUCFB 0.790 01/24/2025    FREET4 0.89 03/24/2016    PREGUR Negative 11/07/2022    PREGSERUM Negative 09/26/2024    HCG <2.00 04/10/2014    SYPHILISAB Non-reactive 01/25/2025    HGBA1C 5.6 01/24/2025     01/24/2025       Counseling / Coordination of Care:    Patient's progress discussed with staff in treatment team meeting.  Medication changes reviewed with staff in treatment team meeting.  Cognitive techniques utilized during the session.  Group attendance encouraged.  Discharge plan discussed with patient.    Jayson Giles MD 01/30/25

## 2025-01-30 NOTE — PROGRESS NOTES
Progress Note - Jo-Ann Lamb 54 y.o. female MRN: [id number]    Unit/Bed#: [facility] 209-02 Encounter: [id number]        Subjective:   Patient seen and examined at bedside after reviewing the chart and discussing the case with the caring staff.      Patient examined at bedside.  Patient complaining of generalized body aches.  Denies any other acute symptoms.     Physical Exam   Vitals: Blood pressure 99/72, pulse 72, temperature (!) 97.4 °F (36.3 °C), temperature source Temporal, resp. rate 18, height 5' (1.524 m), weight 70.3 kg (155 lb), last menstrual period 01/01/2020, SpO2 100%, not currently breastfeeding.,Body mass index is 30.27 kg/m².  Constitutional: Patient in no acute distress.  HEENT: PERR, EOMI, MMM.  Cardiovascular: Normal rate and regular rhythm.    Pulmonary/Chest: Effort normal and breath sounds normal.   Abdomen: Soft, + BS, NT.    Assessment/Plan:  Jo-Ann Lamb is a(n) 54 y.o. female with schizoaffective disorder, bipolar type.      Asthma.  Patient is on Breo Ellipta daily (Advair nonform), albuterol inhaler as needed.   Allergic rhinitis.  Continue Flonase nasal spray daily.   Hyperlipidemia.  Continue atorvastatin 10 mg daily.  Add fish oil daily 1/25.  GERD.  Patient on Protonix 40 mg daily.   Urge incontinence.  Continue oxybutynin 5 mg twice daily.   Constipation/hemorrhoids.  Continue colace twice daily, Senokot S at bedtime.  Apply Anusol-HC as needed.  MOM and lactulose as needed.  Tobacco abuse.  NRT.   Alcohol abuse.  Patient started on thiamine, folic acid, MVI.   Vitamin D deficiency.  Continue vitamin D3 1000 units daily.  Vitamin B12 deficiency.  Continue B12 500 mcg daily.    Chronic lower back pain.  Tylenol or Robaxin as needed.   Thrush.  Nystatin swish and swallow 4x daily x 7 days.   Cough/congestion.  Pt started on Mucinex 600 mg twice daily 1/27/25.    The patient was discussed with Dr. Steel and he is in agreement with the above note.

## 2025-01-30 NOTE — NURSING NOTE
"Patient was observed to be napping in bed this evening, however she did wake to attend snack time without issue.  She states she feels tired tonight, however offers no other complaints.  Patient has been calm and cooperative.  She informs her anxiety and depression are \"so-so\", denies SI, HI and hallucinations. Denies any pain.  Jo-Ann was  medication compliant at HS.  No acute behaviors observed.  Continuous safety rounding in progress.   "

## 2025-01-30 NOTE — NURSING NOTE
"Patient expressed having moderate anxiety that is causing racing thoughts. No outward signs of anxiety. Currently out in acharya in chair, support provided. PRN Ativan 0.5 mg PO given for Martines score of 18. Remains on 15\" checks for safety and behaviors.  "

## 2025-01-30 NOTE — PROGRESS NOTES
01/30/25 1139   Activity/Group Checklist   Group Admission/Discharge   Attendance Attended   Attendance Duration (min) 0-15   Interactions Interacted appropriately   Affect/Mood Appropriate   Goals Achieved Identified feelings;Identified triggers;Identified relapse prevention strategies;Discussed safety plan;Discussed coping strategies;Discussed discharge plans;Identified resources and support systems;Able to listen to others;Able to engage in interactions;Able to reflect/comment on own behavior;Able to manage/cope with feelings;Able to self-disclose;Able to recieve feedback;Able to experience relief/decrease in symptoms     Patient agreeable to meet and complete safety/relapse prevention plan with CTRS.  Patient information from forms can be found in media.

## 2025-01-30 NOTE — NURSING NOTE
"Patient obtained relief from anxiety S/P PRN Ativan. Resting in bed, no acute distress. Remains on 15\" checks for safety and behaviors.  "

## 2025-01-31 VITALS
HEIGHT: 60 IN | RESPIRATION RATE: 18 BRPM | TEMPERATURE: 97.6 F | WEIGHT: 155 LBS | OXYGEN SATURATION: 93 % | SYSTOLIC BLOOD PRESSURE: 126 MMHG | HEART RATE: 91 BPM | DIASTOLIC BLOOD PRESSURE: 84 MMHG | BODY MASS INDEX: 30.43 KG/M2

## 2025-01-31 PROCEDURE — 99238 HOSP IP/OBS DSCHRG MGMT 30/<: CPT | Performed by: PSYCHIATRY & NEUROLOGY

## 2025-01-31 RX ORDER — GUAIFENESIN 600 MG/1
600 TABLET, EXTENDED RELEASE ORAL EVERY 12 HOURS SCHEDULED
Qty: 60 TABLET | Refills: 0 | Status: SHIPPED | OUTPATIENT
Start: 2025-01-31

## 2025-01-31 RX ORDER — FLUTICASONE PROPIONATE AND SALMETEROL XINAFOATE 115; 21 UG/1; UG/1
2 AEROSOL, METERED RESPIRATORY (INHALATION) 2 TIMES DAILY
Qty: 12 G | Refills: 0 | Status: SHIPPED | OUTPATIENT
Start: 2025-01-31

## 2025-01-31 RX ORDER — CHLORAL HYDRATE 500 MG
1000 CAPSULE ORAL DAILY
Qty: 30 CAPSULE | Refills: 0 | Status: SHIPPED | OUTPATIENT
Start: 2025-01-31

## 2025-01-31 RX ORDER — AMOXICILLIN 250 MG
1 CAPSULE ORAL
Qty: 30 TABLET | Refills: 0 | Status: SHIPPED | OUTPATIENT
Start: 2025-01-31

## 2025-01-31 RX ORDER — METHOCARBAMOL 500 MG/1
500 TABLET, FILM COATED ORAL EVERY 6 HOURS PRN
Qty: 30 TABLET | Refills: 0 | Status: SHIPPED | OUTPATIENT
Start: 2025-01-31

## 2025-01-31 RX ORDER — DOCUSATE SODIUM 100 MG/1
100 CAPSULE, LIQUID FILLED ORAL 2 TIMES DAILY
Qty: 60 CAPSULE | Refills: 0 | Status: SHIPPED | OUTPATIENT
Start: 2025-01-31

## 2025-01-31 RX ORDER — NYSTATIN 100000 [USP'U]/ML
500000 SUSPENSION ORAL 4 TIMES DAILY
Qty: 20 ML | Refills: 0 | Status: SHIPPED | OUTPATIENT
Start: 2025-01-31 | End: 2025-02-01

## 2025-01-31 RX ORDER — LANOLIN ALCOHOL/MO/W.PET/CERES
100 CREAM (GRAM) TOPICAL DAILY
Qty: 30 TABLET | Refills: 0 | Status: SHIPPED | OUTPATIENT
Start: 2025-01-31 | End: 2025-02-03 | Stop reason: SDUPTHER

## 2025-01-31 RX ADMIN — GUAIFENESIN 600 MG: 600 TABLET ORAL at 07:49

## 2025-01-31 RX ADMIN — LAMOTRIGINE 100 MG: 100 TABLET ORAL at 07:49

## 2025-01-31 RX ADMIN — RISPERIDONE 2 MG: 2 TABLET, FILM COATED ORAL at 07:49

## 2025-01-31 RX ADMIN — ATORVASTATIN CALCIUM 10 MG: 10 TABLET, FILM COATED ORAL at 07:49

## 2025-01-31 RX ADMIN — OXYBUTYNIN CHLORIDE 5 MG: 5 TABLET ORAL at 07:49

## 2025-01-31 RX ADMIN — PANTOPRAZOLE SODIUM 40 MG: 40 TABLET, DELAYED RELEASE ORAL at 05:55

## 2025-01-31 RX ADMIN — DOCUSATE SODIUM 100 MG: 100 CAPSULE, LIQUID FILLED ORAL at 07:49

## 2025-01-31 RX ADMIN — THIAMINE HCL TAB 100 MG 100 MG: 100 TAB at 07:49

## 2025-01-31 RX ADMIN — FLUTICASONE PROPIONATE 1 SPRAY: 50 SPRAY, METERED NASAL at 07:48

## 2025-01-31 RX ADMIN — LORAZEPAM 0.5 MG: 0.5 TABLET ORAL at 08:56

## 2025-01-31 RX ADMIN — Medication 1 TABLET: at 07:48

## 2025-01-31 RX ADMIN — FLUTICASONE FUROATE AND VILANTEROL TRIFENATATE 1 PUFF: 200; 25 POWDER RESPIRATORY (INHALATION) at 07:48

## 2025-01-31 RX ADMIN — VENLAFAXINE HYDROCHLORIDE 150 MG: 150 CAPSULE, EXTENDED RELEASE ORAL at 07:49

## 2025-01-31 RX ADMIN — OMEGA-3 FATTY ACIDS CAP 1000 MG 1000 MG: 1000 CAP at 07:49

## 2025-01-31 RX ADMIN — NYSTATIN 500000 UNITS: 100000 SUSPENSION ORAL at 07:48

## 2025-01-31 RX ADMIN — CYANOCOBALAMIN TAB 500 MCG 500 MCG: 500 TAB at 07:48

## 2025-01-31 RX ADMIN — FOLIC ACID 1 MG: 1 TABLET ORAL at 07:48

## 2025-01-31 NOTE — PROGRESS NOTES
01/31/25    Team Meeting   Meeting Type Daily Rounds   Team Members Present   Team Members Present Physician;Nurse;;Occupational Therapist   Physician Team Member Tisha LARSON   Nursing Team Member Sonia JIMENES   Social Work Team Member Florian BURROUGHS   OT Team Member Peter PEDRO   Patient/Family Present   Patient Present No   Patient's Family Present No   D/c home [place] [facility] with [facility], denies all, PRN anx over wanting to d/c early

## 2025-01-31 NOTE — BH TRANSITION RECORD
Contact Information: If you have any questions, concerns, pended studies, tests and/or procedures, or emergencies regarding your inpatient behavioral health visit. Please contact [facility] at  and ask to speak to a , nurse or physician. A contact is available 24 hours/ 7 days a week at this number.     Summary of Procedures Performed During your Stay:  Below is a list of major procedures performed during your hospital stay and a summary of results:  - Cardiac Procedures/Studies: EKG with no acute changes. .    Pending Studies (From admission, onward)      None          Please follow up on the above pending studies with your PCP and/or referring provider.

## 2025-01-31 NOTE — PROGRESS NOTES
Progress Note - Jo-Ann Lamb 54 y.o. female MRN: [id number]    Unit/Bed#: [facility] 209-02 Encounter: [id number]        Subjective:   Patient seen and examined at bedside after reviewing the chart and discussing the case with the caring staff.      Patient examined at bedside.  Patient denies any acute symptoms.    Patient is being discharged today, Friday, 1/31/25.    Physical Exam   Vitals: Blood pressure 126/84, pulse 91, temperature 97.6 °F (36.4 °C), temperature source Temporal, resp. rate 18, height 5' (1.524 m), weight 70.3 kg (155 lb), last menstrual period 01/01/2020, SpO2 93%, not currently breastfeeding.,Body mass index is 30.27 kg/m².  Constitutional: Patient in no acute distress.  HEENT: PERR, EOMI, MMM.  Cardiovascular: Normal rate and regular rhythm.    Pulmonary/Chest: Effort normal and breath sounds normal.   Abdomen: Soft, + BS, NT.    Assessment/Plan:  Jo-Ann Lamb is a(n) 54 y.o. female with schizoaffective disorder, bipolar type.     Medical clearance:  Patient is medically cleared for discharge.  All scripts were sent out for the patient.      Asthma.  Patient is on Breo Ellipta daily (Advair nonform), albuterol inhaler as needed.   Allergic rhinitis.  Continue Flonase nasal spray daily.   Hyperlipidemia.  Continue atorvastatin 10 mg daily.  Add fish oil daily 1/25.  GERD.  Patient on Protonix 40 mg daily.   Urge incontinence.  Continue oxybutynin 5 mg twice daily.   Constipation/hemorrhoids.  Continue colace twice daily, Senokot S at bedtime.  Apply Anusol-HC as needed.  MOM and lactulose as needed.  Tobacco abuse.  NRT.   Alcohol abuse.  Patient started on thiamine, folic acid, MVI.   Vitamin D deficiency.  Continue vitamin D3 1000 units daily.  Vitamin B12 deficiency.  Continue B12 500 mcg daily.    Chronic lower back pain.  Tylenol or Robaxin as needed.   Thrush.  Nystatin swish and swallow 4x daily x 7 days.   Cough/congestion.  Pt started on Mucinex 600 mg twice daily 1/27/25.    The patient  was discussed with Dr. Steel and he is in agreement with the above note.

## 2025-01-31 NOTE — NURSING NOTE
Patient appears to have slept through the overnight hours without issue.  No complaints voiced.  No acute behaviors observed. Jo-Ann remains in bed sleeping at this time.  Continuous safety rounding in progress.

## 2025-01-31 NOTE — PROGRESS NOTES
01/31/25 0856   Martines Anxiety Scale   Anxious Mood 3   Tension 3   Fears 3   Insomnia 0   Intellectual 2   Depressed Mood 2   Somatic Complaints: Muscular 1   Somatic Complaints: Sensory 0   Cardiovascular Symptoms 0   Respiratory Symptoms 0   Gastrointestinal Symptoms 0   Genitourinary Symptoms 0   Autonomic Symptoms 1   Behavior at Interview 3   Martines Anxiety Score 18     Prn ativan requested/given 0.5 mg for increased anxiety. Will monitor for prn effects.

## 2025-01-31 NOTE — NURSING NOTE
At time of reassessment, patient was observed to be sleeping.  No further c/o anxiety voiced by patient. PRN Atarax seemingly effective for this patient.

## 2025-01-31 NOTE — TREATMENT TEAM
01/30/25 2044   [place]ines Anxiety Scale   Anxious Mood 2   Tension 2   Fears 2   Insomnia 0   Intellectual 2   Depressed Mood 2   Somatic Complaints: Muscular 0   Somatic Complaints: Sensory 0   Cardiovascular Symptoms 0   Respiratory Symptoms 0   Gastrointestinal Symptoms 0   Genitourinary Symptoms 0   Autonomic Symptoms 1   Behavior at Interview 2   Martines Anxiety Score 13     Administered PRN Atarax 25 mg as per order  for mild anxiety at 2050 for c/o increased anxiety. Martines Scale rating performed with a result of 13.  Will monitor for medication effectiveness.

## 2025-01-31 NOTE — PLAN OF CARE
Problem: Alteration in Thoughts and Perception  Goal: Verbalize thoughts and feelings  Description: Interventions:  - Promote a nonjudgmental and trusting relationship with the patient through active listening and therapeutic communication  - Assess patient's level of functioning, behavior and potential for risk  - Engage patient in 1 on 1 interactions  - Encourage patient to express fears, feelings, frustrations, and discuss symptoms    - [place] patient to reality, help patient recognize reality-based thinking   - Administer medications as ordered and assess for potential side effects  - Provide the patient education related to the signs and symptoms of the illness and desired effects of prescribed medications  Outcome: Progressing  Goal: Agree to be compliant with medication regime, as prescribed and report medication side effects  Description: Interventions:  - Offer appropriate PRN medication and supervise ingestion; conduct AIMS, as needed   Outcome: Progressing     Problem: Ineffective Coping  Goal: Participates in unit activities  Description: Interventions:  - Provide therapeutic environment   - Provide required programming   - Redirect inappropriate behaviors   Outcome: Progressing     Problem: Risk for Self Injury/Neglect  Goal: Refrain from harming self  Description: Interventions:  - Monitor patient closely, per order  - Develop a trusting relationship  - Supervise medication ingestion, monitor effects and side effects   Outcome: Progressing     Problem: Depression  Goal: Refrain from isolation  Description: Interventions:  - Develop a trusting relationship   - Encourage socialization   Outcome: Progressing     Problem: Anxiety  Goal: Anxiety is at manageable level  Description: Interventions:  - Assess and monitor patient's anxiety level.   - Monitor for signs and symptoms (heart palpitations, chest pain, shortness of breath, headaches, nausea, feeling jumpy, restlessness, irritable, apprehensive).    - Collaborate with interdisciplinary team and initiate plan and interventions as ordered.  - [place] patient to unit/surroundings  - Explain treatment plan  - Encourage participation in care  - Encourage verbalization of concerns/fears  - Identify coping mechanisms  - Assist in developing anxiety-reducing skills  - Administer/offer alternative therapies  - Limit or eliminate stimulants  Outcome: Progressing

## 2025-01-31 NOTE — SOCIAL WORK
OLIVE spoke to Dandre [facility] [phone]. Discussed pt request for d/c Fri 1/31. Reviewed d/c plan and supports. Pt states her boyfriend will pick her up at 10:30am. Dandre states that he met with pt on unit yesterday and pt appeared at baseline. He noted pt behavior is within the norm. Dandre requests psych meds go to [place] and medical meds can go to [facility].  Pt manages her own medical meds.      OLIVE spoke to Jacob at [facility] [phone]. He is in agreement to pt d/c Monday.  Reviewed d/c plan and supports. CM to fax AVS.

## 2025-01-31 NOTE — DISCHARGE SUMMARY
"Discharge Summary - Behavioral Health   Name: Jo-Ann Lamb 54 y.o. female I MRN: [id number]  Unit/Bed#: [facility] 209-02 I Date of Admission: 1/24/2025   Date of Service: 1/31/2025 I Hospital Day: 7       Admission Date: 1/24/2025         Discharge Date: 1/31/2025    Attending Psychiatrist: Jayson Giles MD    Reason for Admission/HPI: Major depressive disorder, single episode, unspecified [F32.9]  Depression with suicidal ideation [F32.A, R45.851]    According to Dr. Matias:    History of Present Illness:    Jo-Ann Lamb is a 54 y.o. female with a history of Schizoaffective Disorder who was admitted to the inpatient psychiatric unit on a voluntary 201 commitment basis due to depression, anxiety, unstable mood, psychotic symptoms, auditory hallucinations, and visual hallucinations.   Symptoms prior to admission included worsening depression, hopelessness, helplessness, auditory hallucinations, paranoid ideation, and delusional thoughts. Onset of symptoms was gradual starting several days ago with gradually worsening course since that time. Stressors preceding admission included alcohol use problems.   On initial evaluation after admission to the inpatient psychiatric unit the patient reported that despite her taking combination of Clozaril 400 mg without significant side effects, but with some occasional constipation and some hypersalivation at night, and risperidone 2 mg twice a day, the patient is continuing to hear voices and recently the intensity of voices increased telling that she has to \"leave her house \".  They did not tell her to kill herself but the patient in order to deal with this increase of her auditory hallucinations took some extra Clozaril but she could not say how many pills. She stated she does not remember. The patient is followed by ACT team who evaluated the patient after that and brought her to the emergency room. The patient admitted feeling depressed and stated that low dose of Effexor " "Effexor that was started during our previous inpatient treatment in September last year did not help much. She also takes Lamictal 100 twice a day to help with her mood disorder.  The patient is a smoker she states she smokes half of a pack a day. She is intermittently good historian, at times not providing direct information's about herself, but stated that she lives in the apartment and supported by his ACT team. She did can take care of herself most of the time going to buy groceries. She admitted to drinking some alcohol but she stated usually drink 2 drinks and go to bed denied any history of seizures complicated alcohol withdrawal. Not on any symptoms of withdrawal at this point of time.  Because of the patient's history of alcohol abuse, alcoholic hallucinosis is a part of differential diagnosis of worsening of her auditory hallucinations, however the patient had persistent treatment refractory psychotic disorder that most likely is associated with her current exacerbation of her symptoms..The patient had relatively recent inpatient treatment, that time she continued to report hearing voices and had paranoid ideation that other people influence her by \"hypnoses\". The patient did not endorse paranoid delusions regarding \"hypnosis\" today.    Hospital Course: The patient was admitted to the inpatient psychiatric unit and started on every 7 minutes precautions. During the hospitalization the patient was attending individual therapy, group therapy, milieu therapy and occupational therapy.    Psychiatric medications were titrated over the hospital stay. To address depressive symptoms, mood instability, psychotic symptoms, anxiety symptoms, and insomnia the patient was started on antidepressant Effexor XR, mood stabilizer Lamictal, and antipsychotic medication Risperdal and Clozaril. Clozaril was reduced to 350 mg po hs from 400 mg, since level came back high. Medication doses were titrated during the hospital " course. Prior to beginning of treatment medications risks and benefits and possible side effects including risk of suicidality and serotonin syndrome related to treatment with antidepressants, risks of misuse, abuse or dependence, sedation and respiratory depression related to treatment with benzodiazepine medications, and risks of cardiovascular side effects including elevated blood pressure, risk of misuse, abuse or dependence and risk of increased anxiety related to treatment with stimulant medications were reviewed with the patient. The patient verbalized understanding and agreement for treatment. Her Clozaril level stabilized ( 614 on 1/26/25). ANC was 5.2 on 1/24/25.       Patient's symptoms improved gradually over the hospital course. At the end of treatment the patient was doing well. Mood was stable at the time of discharge. The patient denied suicidal ideation, intent or plan at the time of discharge and denied homicidal ideation, intent or plan at the time of discharge. There was no overt psychosis at the time of discharge. Sleep and appetite were improved. The patient was tolerating medications and was not reporting any significant side effects at the time of discharge. She was strongly encouraged to minimize use of Ativan and other prn medications.       Since the patient was doing well at the end of the hospitalization, treatment team felt that the patient could be safely discharged to outpatient care with her ACT team. The outpatient follow up with  was arranged by the unit  upon discharge.    Mental Status at time of Discharge:     Appearance:  age appropriate   Behavior:  cooperative   Speech:  normal volume   Mood:  euthymic   Affect:  mood-congruent   Thought Process:  goal directed   Thought Content:  normal   Perceptual Disturbances: None   Risk Potential: Suicidal Ideations none, HI none   Sensorium:  person, place, and time/date   Cognition:  recent and remote memory grossly intact    Consciousness:  alert and awake    Attention: attention span and concentration were age appropriate   Insight:  fair   Judgment: fair   Gait/Station: normal gait/station   Motor Activity: no abnormal movements     Admission Diagnosis:Major depressive disorder, single episode, unspecified [F32.9]  Depression with suicidal ideation [F32.A, R44.291]    Discharge Diagnosis:   Principal Problem:    Schizoaffective disorder, bipolar type ([facility])  Active Problems:    Degenerative disc disease, lumbar    Hyperlipidemia    Post-traumatic stress disorder, chronic    Gastroesophageal reflux disease  Resolved Problems:    * No resolved hospital problems. *    Lab results:  Admission on 01/24/2025   Component Date Value    Hemoglobin A1C 01/24/2025 5.6     EAG 01/24/2025 114     Clozapine Lvl 01/24/2025 1,127 (H)     WBC 01/24/2025 8.68     RBC 01/24/2025 4.36     Hemoglobin 01/24/2025 13.1     Hematocrit 01/24/2025 40.7     MCV 01/24/2025 93     MCH 01/24/2025 30.0     MCHC 01/24/2025 32.2     RDW 01/24/2025 13.9     MPV 01/24/2025 9.5     Platelets 01/24/2025 279     nRBC 01/24/2025 0     Segmented % 01/24/2025 58     Immature Grans % 01/24/2025 1     Lymphocytes % 01/24/2025 28     Monocytes % 01/24/2025 7     Eosinophils Relative 01/24/2025 5     Basophils Relative 01/24/2025 1     Absolute Neutrophils 01/24/2025 5.20     Absolute Immature Grans 01/24/2025 0.05     Absolute Lymphocytes 01/24/2025 2.43     Absolute Monocytes 01/24/2025 0.57     Eosinophils Absolute 01/24/2025 0.39     Basophils Absolute 01/24/2025 0.04     Ventricular Rate 01/25/2025 99     Atrial Rate 01/25/2025 99     OH Interval 01/25/2025 152     QRSD Interval 01/25/2025 76     QT Interval 01/25/2025 366     QTC Interval 01/25/2025 470     P Axis 01/25/2025 61     QRS Axis 01/25/2025 -1     T Wave [place] 01/25/2025 52     Vitamin B-12 01/25/2025 535     Folate 01/25/2025 >22.3     Vit D, 25-Hydroxy 01/25/2025 36.7     Cholesterol 01/25/2025 235 (H)      Triglycerides 01/25/2025 433 (H)     HDL, Direct 01/25/2025 68     LDL Calculated 01/25/2025      Non-HDL-Chol (CHOL-HDL) 01/25/2025 167     BNP 01/25/2025 59     CRP 01/25/2025 18.1 (H)     HS TnI random 01/25/2025 4 (L)     Syphilis Total Antibody 01/25/2025 Non-reactive     Total CK 01/25/2025 36     Clozapine Lvl 01/26/2025 614        Discharge Medications:  Current Discharge Medication List        START taking these medications    Details   docusate sodium (COLACE) 100 mg capsule Take 1 capsule (100 mg total) by mouth 2 (two) times a day  Qty: 60 capsule, Refills: 0    Associated Diagnoses: Slow transit constipation      nystatin (MYCOSTATIN) 500,000 units/5 mL suspension Swish and swallow 5 mL (500,000 Units total) 4 (four) times a day for 4 doses  Qty: 20 mL, Refills: 0    Associated Diagnoses: Pulmonary emphysema (HCC)      Omega-3 Fatty Acids (fish oil) 1,000 mg Take 1 capsule (1,000 mg total) by mouth daily  Qty: 30 capsule, Refills: 0    Associated Diagnoses: Hyperlipidemia      thiamine 100 MG tablet Take 1 tablet (100 mg total) by mouth daily  Qty: 30 tablet, Refills: 0    Associated Diagnoses: Alcohol use              Current Discharge Medication List        STOP taking these medications       baclofen 10 mg tablet Comments:   Reason for Stopping:         hydrocortisone (ANUSOL-HC) 25 mg suppository Comments:   Reason for Stopping:         ibuprofen (MOTRIN) 600 mg tablet Comments:   Reason for Stopping:         lidocaine (LIDODERM) 5 % Comments:   Reason for Stopping:         LORazepam (ATIVAN) 0.5 mg tablet Comments:   Reason for Stopping:         metaxalone (SKELAXIN) 800 mg tablet Comments:   Reason for Stopping:         methylPREDNISolone 4 MG tablet therapy pack Comments:   Reason for Stopping:         phenol (CHLORASEPTIC) 1.4 % mucosal liquid Comments:   Reason for Stopping:         polyethylene glycol (GLYCOLAX) 17 GM/SCOOP powder Comments:   Reason for Stopping:          promethazine-dextromethorphan (PHENERGAN-DM) 6.25-15 mg/5 mL oral syrup Comments:   Reason for Stopping:                Current Discharge Medication List        CONTINUE these medications which have CHANGED    Details   cloZAPine (CLOZARIL) 100 mg tablet Take 3.5 tablets (350 mg total) by mouth daily at bedtime  Qty: 105 tablet, Refills: 0    Associated Diagnoses: Schizoaffective disorder, bipolar type (HCC)      fluticasone-salmeterol (Advair HFA) 115-21 MCG/ACT inhaler Inhale 2 puffs 2 (two) times a day Rinse mouth after use.  Qty: 12 g, Refills: 0    Comments: Substitution to a formulary equivalent within the same pharmaceutical class is authorized.  Associated Diagnoses: Pulmonary emphysema, unspecified emphysema type (HCC)      guaiFENesin (MUCINEX) 600 mg 12 hr tablet Take 1 tablet (600 mg total) by mouth every 12 (twelve) hours  Qty: 60 tablet, Refills: 0    Associated Diagnoses: Pulmonary emphysema (HCC)      methocarbamol (ROBAXIN) 500 mg tablet Take 1 tablet (500 mg total) by mouth every 6 (six) hours as needed for muscle spasms  Qty: 30 tablet, Refills: 0    Associated Diagnoses: Chronic midline low back pain without sciatica      senna-docusate sodium (SENOKOT S) 8.6-50 mg per tablet Take 1 tablet by mouth daily at bedtime  Qty: 30 tablet, Refills: 0    Associated Diagnoses: Slow transit constipation      venlafaxine (EFFEXOR-XR) 150 mg 24 hr capsule Take 1 capsule (150 mg total) by mouth daily  Qty: 30 capsule, Refills: 0    Associated Diagnoses: Schizoaffective disorder, bipolar type (HCC)              Current Discharge Medication List        CONTINUE these medications which have NOT CHANGED    Details   albuterol (PROVENTIL HFA,VENTOLIN HFA) 90 mcg/act inhaler Inhale 2 puffs every 4 (four) hours as needed for wheezing  Qty: 18 g, Refills: 0    Comments: Substitution to a formulary equivalent within the same pharmaceutical class is authorized.  Associated Diagnoses: Mild intermittent asthmatic  bronchitis with acute exacerbation      atorvastatin (LIPITOR) 10 mg tablet Take 1 tablet (10 mg total) by mouth daily  Qty: 90 tablet, Refills: 0    Associated Diagnoses: Mixed hyperlipidemia      D3-1000 25 MCG (1000 UT) capsule Take 1 capsule (1,000 Units total) by mouth daily  Qty: 90 capsule, Refills: 1    Associated Diagnoses: Vitamin D deficiency      fluticasone (FLONASE) 50 mcg/act nasal spray 1 spray into each nostril daily  Qty: 16 g, Refills: 0    Associated Diagnoses: Congestion of nasal sinus      folic acid (FOLVITE) 1 mg tablet Take 1 tablet (1 mg total) by mouth daily  Qty: 30 tablet, Refills: 1    Associated Diagnoses: Alcohol abuse      hydrocortisone (ANUSOL-HC) 2.5 % rectal cream Apply topically 4 (four) times a day as needed for hemorrhoids  Qty: 28 g, Refills: 0    Associated Diagnoses: Hemorrhoids, unspecified hemorrhoid type      lamoTRIgine (LaMICtal) 100 mg tablet Take 1 tablet (100 mg total) by mouth 2 (two) times a day for 7 days  Qty: 14 tablet, Refills: 0    Associated Diagnoses: Schizoaffective disorder, bipolar type (HCC)      oxybutynin (DITROPAN) 5 mg tablet TAKE 1 TABLET BY MOUTH TWICE A DAY  Qty: 60 tablet, Refills: 5    Comments: DX Code Needed  .  Associated Diagnoses: Overactive bladder      pantoprazole (PROTONIX) 40 mg tablet Take 1 tablet (40 mg total) by mouth daily before breakfast  Qty: 90 tablet, Refills: 0    Associated Diagnoses: Gastroesophageal reflux disease without esophagitis      risperiDONE (RisperDAL) 2 mg tablet Take 2 mg by mouth 2 (two) times a day Per patient      vitamin B-12 (VITAMIN B-12) 500 mcg tablet TAKE 1 TABLET BY MOUTH DAILY  Qty: 30 tablet, Refills: 5    Comments: DX Code Needed  .  Associated Diagnoses: Vitamin B12 deficiency      Incontinence Supply Disposable (Depend Underwear Sm/Med) MISC Use 3 (three) times a day as needed (incontinence)  Qty: 138 each, Refills: 7    Associated Diagnoses: Mixed stress and urge urinary incontinence               Discharge instructions/Information to patient and family:   See after visit summary for information provided to patient and family.      Provisions for Follow-Up Care:  See after visit summary for information related to follow-up care and any pertinent home health orders.      Discharge Statement   I spent 20 minutes discharging the patient. This time was spent on the day of discharge. I had direct contact with the patient on the day of discharge. Additional documentation is required if more than 30 minutes were spent on discharge.

## 2025-01-31 NOTE — SOCIAL WORK
[place]:       Medications 01/31/25 02/01/25 02/02/25 02/03/25 02/04/25 02/05/25 02/06/25   atorvastatin (LIPITOR) tablet 10 mg  Dose: 10 mg  Freq: Daily Route: PO  Start: 01/25/25 0900    0749           0900      0900      0900           cloZAPine (CLOZARIL) tablet 350 mg  Dose: 350 mg  Freq: Daily at bedtime Route: PO  Start: 01/27/25 2200    2200      2200      2200      2200      2200      2200         cyanocobalamin (VITAMIN B-12) tablet 500 mcg  Dose: 500 mcg  Freq: Daily Route: PO  Start: 01/25/25 0900    0748           0900      0900      0900           docusate sodium (COLACE) capsule 100 mg  Dose: 100 mg  Freq: 2 times daily Route: PO  Start: 01/25/25 1045    0749          1800      0900     1800      0900     1800      0900     1800           fish oil capsule 1,000 mg  Dose: 1,000 mg  Freq: Daily Route: PO  Start: 01/25/25 1430   Admin Instructions:   Swallow whole; do not break, crush, dissolve, or chew.    0749           0900      0900      0900           fluticasone (FLONASE) 50 mcg/act nasal spray 1 spray  Dose: 1 spray  Freq: Daily Route: NA  Start: 01/25/25 0900   Admin Instructions:   LOOK ALIKE SOUND ALIKE MED    0748           0900      0900      0900           fluticasone-vilanterol 200-25 mcg/actuation 1 puff  Dose: 1 puff  Freq: Daily (RESP) Route: IN  Start: 01/25/25 0800   Admin Instructions:   Rinse mouth after use.    0748      0800      0800      0800           folic acid (FOLVITE) tablet 1 mg  Dose: 1 mg  Freq: Daily Route: PO  Start: 01/25/25 0900    0748           0900      0900      0900           guaiFENesin (MUCINEX) 12 hr tablet 600 mg  Dose: 600 mg  Freq: Every 12 hours scheduled Route: PO  Start: 01/27/25 1015   Admin Instructions:   Do not crush, chew or spilt tablets.    0749          2100      0900     2100      0900     2100      0900     2100      0900     2100      0900     2100         lamoTRIgine (LaMICtal) tablet 100 mg  Dose: 100 mg  Freq: 2 times daily Route: PO  Start:  01/24/25 1800   Admin Instructions:   LOOK ALIKE SOUND ALIKE MED    0749          1800      0900     1800      0900     1800      0900     1800      0900     1800      0900     1800         multivitamin-minerals (CENTRUM) tablet 1 tablet  Dose: 1 tablet  Freq: Daily Route: PO  Start: 01/25/25 0900   Admin Instructions:   **DISPOSE IN 8 GALLON BLACK CONTAINER**    0748           0900      0900      0900           nicotine (NICODERM CQ) 14 mg/24hr TD 24 hr patch 14 mg  Dose: 14 mg  Freq: Daily Route: TD  Start: 01/25/25 1145   Admin Instructions:   Hazardous agent; use appropriate precautions for handling and disposal. **DISPOSE EMPTY PACKAGING AND LEFTOVER/UNUSED MEDICATION IN 8 GALLON BLACK CONTAINER**.    0747     (0850)      0900      0900      0900           nystatin (MYCOSTATIN) oral suspension 500,000 Units  Dose: 500,000 Units  Freq: 4 times daily Route: SWISH & SWAL  Start: 01/25/25 1430 End: 02/01/25 1159   Admin Instructions:   Swish in mouth and retain as long as possible before swallowing.  Shake well. LOOK ALIKE SOUND ALIKE MED    0748          1200     1800     2200      0900     1159-D/C'd           oxybutynin (DITROPAN) tablet 5 mg  Dose: 5 mg  Freq: 2 times daily Route: PO  Start: 01/24/25 1800   Admin Instructions:   LOOK ALIKE SOUND ALIKE MED    0749          1800      0900     1800      0900     1800      0900     1800      0900     1800      0900     1800         pantoprazole (PROTONIX) EC tablet 40 mg  Dose: 40 mg  Freq: Daily before breakfast Route: PO  Start: 01/25/25 0700   Admin Instructions:   Swallow whole; do not crush, chew or split. [place]    0555           0700      0700      0700           risperiDONE (RisperDAL) tablet 2 mg  Dose: 2 mg  Freq: 2 times daily Route: PO  Start: 01/24/25 1800   Admin Instructions:   Hazardous agent; use appropriate precautions for handling and disposal. LOOK ALIKE SOUND ALIKE MED    0749          1800      0900     1800      0900      1800      0900     1800      0900     1800      0900     1800         senna-docusate sodium (SENOKOT S) 8.6-50 mg per tablet 1 tablet  Dose: 1 tablet  Freq: Daily at bedtime Route: PO  Start: 01/24/25 2200    2200      2200      2200      2200      2200      2200         thiamine tablet 100 mg  Dose: 100 mg  Freq: Daily Route: PO  Start: 01/25/25 0900 0749 0900 0900 0900           venlafaxine (EFFEXOR-XR) 24 hr capsule 150 mg  Dose: 150 mg  Freq: Daily Route: PO  Start: 01/29/25 0900   Admin Instructions:   Swallow whole or open and sprinkle on applesauce; do not crush or chew. LOOK ALIKE SOUND ALIKE MED    0749           0900 0900 0900      0900

## 2025-01-31 NOTE — NURSING NOTE
Patient was withdrawn to her room this evening; states she feels tired tonight.  Jo-Ann presents with a flat affect; has been calm and cooperative during staff interactions. She endorses ongoing anxiety and depression, however informs that they are improved.  Denies SI, HI and hallucinations. Denies any pain.  She was medication compliant at HS.  Continuous safety rounding in progress.

## 2025-02-03 ENCOUNTER — OFFICE VISIT (OUTPATIENT)
Dept: FAMILY MEDICINE CLINIC | Facility: CLINIC | Age: 55
End: 2025-02-03
Payer: COMMERCIAL

## 2025-02-03 VITALS
DIASTOLIC BLOOD PRESSURE: 88 MMHG | RESPIRATION RATE: 20 BRPM | HEIGHT: 60 IN | HEART RATE: 88 BPM | WEIGHT: 153 LBS | TEMPERATURE: 97.9 F | SYSTOLIC BLOOD PRESSURE: 146 MMHG | BODY MASS INDEX: 30.04 KG/M2

## 2025-02-03 DIAGNOSIS — E78.2 MIXED HYPERLIPIDEMIA: ICD-10-CM

## 2025-02-03 DIAGNOSIS — F25.0 SCHIZOAFFECTIVE DISORDER, BIPOLAR TYPE (HCC): ICD-10-CM

## 2025-02-03 DIAGNOSIS — B37.0 THRUSH: ICD-10-CM

## 2025-02-03 DIAGNOSIS — M94.0 COSTOCHONDRITIS: ICD-10-CM

## 2025-02-03 DIAGNOSIS — Z12.11 COLON CANCER SCREENING: ICD-10-CM

## 2025-02-03 DIAGNOSIS — K59.01 SLOW TRANSIT CONSTIPATION: ICD-10-CM

## 2025-02-03 DIAGNOSIS — I10 PRIMARY HYPERTENSION: Primary | ICD-10-CM

## 2025-02-03 DIAGNOSIS — K52.9 GASTROENTERITIS: ICD-10-CM

## 2025-02-03 DIAGNOSIS — Z78.9 ALCOHOL USE: ICD-10-CM

## 2025-02-03 PROCEDURE — 99214 OFFICE O/P EST MOD 30 MIN: CPT | Performed by: FAMILY MEDICINE

## 2025-02-03 RX ORDER — FLUCONAZOLE 150 MG/1
150 TABLET ORAL ONCE
Qty: 1 TABLET | Refills: 0 | Status: SHIPPED | OUTPATIENT
Start: 2025-02-03 | End: 2025-02-03

## 2025-02-03 RX ORDER — ATORVASTATIN CALCIUM 10 MG/1
10 TABLET, FILM COATED ORAL DAILY
Qty: 90 TABLET | Refills: 0 | Status: SHIPPED | OUTPATIENT
Start: 2025-02-03

## 2025-02-03 RX ORDER — FLUCONAZOLE 100 MG/1
100 TABLET ORAL DAILY
Qty: 7 TABLET | Refills: 0 | Status: SHIPPED | OUTPATIENT
Start: 2025-02-03 | End: 2025-02-10

## 2025-02-03 RX ORDER — CALCIUM CARBONATE/VITAMIN D3 500-10/5ML
1 LIQUID (ML) ORAL DAILY
Qty: 90 CAPSULE | Refills: 0 | Status: SHIPPED | OUTPATIENT
Start: 2025-02-03

## 2025-02-03 RX ORDER — ONDANSETRON 4 MG/1
4 TABLET, FILM COATED ORAL EVERY 8 HOURS PRN
Qty: 20 TABLET | Refills: 0 | Status: SHIPPED | OUTPATIENT
Start: 2025-02-03

## 2025-02-03 RX ORDER — LACTULOSE 10 G/15ML
10 SOLUTION ORAL
Qty: 240 ML | Refills: 3 | Status: SHIPPED | OUTPATIENT
Start: 2025-02-03

## 2025-02-03 RX ORDER — METHYLPREDNISOLONE 4 MG/1
TABLET ORAL
Qty: 21 EACH | Refills: 0 | Status: SHIPPED | OUTPATIENT
Start: 2025-02-03

## 2025-02-03 RX ORDER — LOSARTAN POTASSIUM 50 MG/1
50 TABLET ORAL DAILY
Qty: 100 TABLET | Refills: 3 | Status: SHIPPED | OUTPATIENT
Start: 2025-02-03

## 2025-02-03 RX ORDER — LANOLIN ALCOHOL/MO/W.PET/CERES
100 CREAM (GRAM) TOPICAL DAILY
Qty: 30 TABLET | Refills: 0 | Status: SHIPPED | OUTPATIENT
Start: 2025-02-03

## 2025-02-03 NOTE — ASSESSMENT & PLAN NOTE
We will provide lactulose and magnesium oxide  Orders:    Ambulatory Referral to Gastroenterology; Future    lactulose (CHRONULAC) 10 g/15 mL solution; Take 15 mL (10 g total) by mouth daily in the early morning    Magnesium Oxide 400 MG CAPS; Take 1 tablet (400 mg total) by mouth in the morning  Refer to gastroenterology for evaluation

## 2025-02-03 NOTE — PROGRESS NOTES
Name: Jo-Ann Lamb      : 1970      MRN: [id number]  Encounter Provider: Cipriano Lew DO  Encounter Date: 2/3/2025   Encounter department: [facility]  :  Assessment & Plan  Primary hypertension  Will begin Cozaar 50 mg daily with a recheck in 1 month  Orders:    losartan (Cozaar) 50 mg tablet; Take 1 tablet (50 mg total) by mouth daily    Mixed hyperlipidemia   with a total cholesterol of 236 triglycerides of 433 HDL of 66 and LDL of 167.  The patient was prescribed atorvastatin 10 mg but has not picked up the prescription.  The patient was advised to obtain the prescription for her cholesterol  Orders:    atorvastatin (LIPITOR) 10 mg tablet; Take 1 tablet (10 mg total) by mouth daily    Alcohol use  Will renew folate  Orders:    thiamine 100 MG tablet; Take 1 tablet (100 mg total) by mouth daily    Slow transit constipation  We will provide lactulose and magnesium oxide  Orders:    Ambulatory Referral to Gastroenterology; Future    lactulose (CHRONULAC) 10 g/15 mL solution; Take 15 mL (10 g total) by mouth daily in the early morning    Magnesium Oxide 400 MG CAPS; Take 1 tablet (400 mg total) by mouth in the morning  Refer to gastroenterology for evaluation  Schizoaffective disorder, bipolar type (HCC)  Under the care of behavioral health       Thrush  Will provide Diflucan  Orders:    fluconazole (DIFLUCAN) 100 mg tablet; Take 1 tablet (100 mg total) by mouth daily for 7 days    Gastroenteritis    Orders:    ondansetron (ZOFRAN) 4 mg tablet; Take 1 tablet (4 mg total) by mouth every 8 (eight) hours as needed for nausea or vomiting    Costochondritis    Orders:    methylPREDNISolone 4 MG tablet therapy pack; Use as directed on package    Colon cancer screening                History of Present Illness   Patient presents with a chief complaint of constipation, thrush, and to go over lab.      Review of Systems   Constitutional:  Negative for chills and fever.   HENT:   Negative for ear pain and sore throat.    Eyes:  Negative for pain and visual disturbance.   Respiratory:  Negative for cough and shortness of breath.    Cardiovascular:  Negative for chest pain and palpitations.   Gastrointestinal:  Positive for constipation. Negative for abdominal pain and vomiting.   Genitourinary:  Negative for dysuria and hematuria.   Musculoskeletal:  Negative for arthralgias and back pain.   Skin:  Negative for color change and rash.   Neurological:  Negative for seizures and syncope.   All other systems reviewed and are negative.      Objective   /88   Pulse 88   Temp 97.9 °F (36.6 °C)   Resp 20   Ht 5' (1.524 m)   Wt 69.4 kg (153 lb)   LMP 01/01/2020   BMI 29.88 kg/m²      Physical Exam  Constitutional:       Appearance: Normal appearance. She is obese.   HENT:      Head: Normocephalic and atraumatic.      Right Ear: Tympanic membrane, ear canal and external ear normal.      Left Ear: Tympanic membrane, ear canal and external ear normal.      Nose: Nose normal.      Mouth/Throat:      Mouth: Mucous membranes are moist.      Pharynx: Oropharynx is clear.   Eyes:      Extraocular Movements: Extraocular movements intact.      Conjunctiva/sclera: Conjunctivae normal.      Pupils: Pupils are equal, round, and reactive to light.   Cardiovascular:      Rate and Rhythm: Normal rate and regular rhythm.      Pulses: Normal pulses.      Heart sounds: Normal heart sounds.   Pulmonary:      Effort: Pulmonary effort is normal.      Breath sounds: Normal breath sounds.   Abdominal:      General: Abdomen is flat. Bowel sounds are normal.      Palpations: Abdomen is soft.   Musculoskeletal:         General: Normal range of motion.      Cervical back: Normal range of motion and neck supple. No tenderness.   Skin:     General: Skin is warm and dry.      Capillary Refill: Capillary refill takes less than 2 seconds.   Neurological:      General: No focal deficit present.      Mental Status: She is  alert and oriented to person, place, and time.   Psychiatric:         Mood and Affect: Mood normal.         Behavior: Behavior normal.

## 2025-02-03 NOTE — ASSESSMENT & PLAN NOTE
with a total cholesterol of 236 triglycerides of 433 HDL of 66 and LDL of 167.  The patient was prescribed atorvastatin 10 mg but has not picked up the prescription.  The patient was advised to obtain the prescription for her cholesterol  Orders:    atorvastatin (LIPITOR) 10 mg tablet; Take 1 tablet (10 mg total) by mouth daily

## 2025-02-07 ENCOUNTER — TELEPHONE (OUTPATIENT)
Age: 55
End: 2025-02-07

## 2025-02-07 NOTE — TELEPHONE ENCOUNTER
Advised patient that the referral was faxed 12/06/24 at her request - gave her the phone # [phone] to call and touch base with them

## 2025-02-07 NOTE — TELEPHONE ENCOUNTER
Patient called asking if Dr. Lew would write and fax a referral for [facility] For the patient.    Dr. Pérez   Fax #[phone]

## 2025-02-10 ENCOUNTER — OFFICE VISIT (OUTPATIENT)
Dept: URGENT CARE | Facility: CLINIC | Age: 55
End: 2025-02-10
Payer: COMMERCIAL

## 2025-02-10 VITALS
WEIGHT: 156 LBS | TEMPERATURE: 97.9 F | SYSTOLIC BLOOD PRESSURE: 111 MMHG | DIASTOLIC BLOOD PRESSURE: 69 MMHG | OXYGEN SATURATION: 96 % | HEART RATE: 104 BPM | RESPIRATION RATE: 18 BRPM | HEIGHT: 61 IN | BODY MASS INDEX: 29.45 KG/M2

## 2025-02-10 DIAGNOSIS — R19.8 GI SYMPTOMS: Primary | ICD-10-CM

## 2025-02-10 PROCEDURE — 99213 OFFICE O/P EST LOW 20 MIN: CPT

## 2025-02-10 RX ORDER — LORAZEPAM 0.5 MG/1
TABLET ORAL
COMMUNITY
Start: 2025-02-04

## 2025-02-10 RX ORDER — VENLAFAXINE 37.5 MG/1
TABLET ORAL
COMMUNITY
Start: 2025-01-15

## 2025-02-10 RX ORDER — ONDANSETRON 4 MG/1
4 TABLET, FILM COATED ORAL EVERY 8 HOURS PRN
Qty: 20 TABLET | Refills: 0 | Status: SHIPPED | OUTPATIENT
Start: 2025-02-10

## 2025-02-10 RX ORDER — ASPIRIN 325 MG/1
1 TABLET, FILM COATED ORAL DAILY
COMMUNITY
Start: 2025-02-03

## 2025-02-10 RX ORDER — LANOLIN ALCOHOL/MO/W.PET/CERES
400 CREAM (GRAM) TOPICAL EVERY MORNING
COMMUNITY
Start: 2025-02-03

## 2025-02-10 NOTE — PATIENT INSTRUCTIONS
May take Zofran every 8 hours as needed for nausea or vomiting.    While sick, make sure you get lots of rest and increase your fluid intake.   You may use OTC nasal saline spray, Flonase, and Mucinex for mild congestion.   Take Tylenol or Ibuprofen for fever, mild pain, and/or body aches.  Perform salt water gargles, drink warm tea with honey, and use throat lozenges and Chloraseptic spray for sore throat.    You can also apply warm compresses over your sinuses for any sinus pressure.     Try your best to stay hydrated and drink more fluids to replace fluids lost from any vomiting and diarrhea. Water is best, but you can try broths, diluted fruit juices or clear sodas.  You may also try oral rehydration or electrolyte solutions including Gatorade, Powerade, Pedialyte, etc. Please avoid drinks with milk, red dyes, caffeine, or alcohol. Once feeling better, may advance to bland diet (ie. BRAT - bananas, applesauce, toast) as tolerated.    Recommend avoiding any milk products, spicy, greasy foods and citrus products over the next 1-2 weeks.      Please proceed to the ED with any drowsiness, persistent vomiting or vomiting blood, bloody diarrhea, or signs of dehydration.      Please follow up and make apt with GI doctor as soon as possible.   If your symptoms do not improve with our current treatment plan or worsen (vomiting blood, increased abdominal pain, etc.), please schedule an appointment with your PCP or proceed to the ED.
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
## 2025-02-13 NOTE — LETTER
[facility] EMERGENCY DEPARTMENT  [address] [place] [address]  Dept: [phone]      EMTALA TRANSFER CONSENT    NAME Jo-Ann SHARIF 1970                              MRN [id number]    I have been informed of my rights regarding examination, treatment, and transfer   by Dr. Raulito Martin I*    Benefits: Continuity of care    Risks: Potential for delay in receiving treatment      Consent for Transfer:  I acknowledge that my medical condition has been evaluated and explained to me by the emergency department physician or other qualified medical person and/or my attending physician, who has recommended that I be transferred to the service of  Accepting Physician: Dr.Mariam Castro at Accepting Facility Name, City & State : [facility], [place]. The above potential benefits of such transfer, the potential risks associated with such transfer, and the probable risks of not being transferred have been explained to me, and I fully understand them.  The doctor has explained that, in my case, the benefits of transfer outweigh the risks.  I agree to be transferred.    I authorize the performance of emergency medical procedures and treatments upon me in both transit and upon arrival at the receiving facility.  Additionally, I authorize the release of any and all medical records to the receiving facility and request they be transported with me, if possible.  I understand that the safest mode of transportation during a medical emergency is an ambulance and that the Hospital advocates the use of this mode of transport. Risks of traveling to the receiving facility by car, including absence of medical control, life sustaining equipment, such as oxygen, and medical personnel has been explained to me and I fully understand them.    (OSEI CORRECT BOX BELOW)  [ X ]  I consent to the stated transfer and to be transported by ambulance/helicopter.  [  ]   I consent to the stated transfer, but refuse transportation by ambulance and accept full responsibility for my transportation by car.  I understand the risks of non-ambulance transfers and I exonerate the Hospital and its staff from any deterioration in my condition that results from this refusal.    X___________________________________________    DATE  24  TIME________  Signature of patient or legally responsible individual signing on patient behalf           RELATIONSHIP TO PATIENT____SELF_____________________                    Provider Certification    NAME Jo-Ann Lamb                                         1970                              MRN [id number]    A medical screening exam was performed on the above named patient.  Based on the examination:    Condition Necessitating Transfer The primary encounter diagnosis was Psychoses (HCC). Diagnoses of Suicidal ideations and Encounter for psychological evaluation were also pertinent to this visit.    Patient Condition: The patient has been stabilized such that within reasonable medical probability, no material deterioration of the patient condition or the condition of the unborn child(yeni) is likely to result from the transfer    Reason for Transfer: Level of Care needed not available at this facility    Transfer Requirements: Facility [place], [place]   Space available and qualified personnel available for treatment as acknowledged by Ayah Quevedo [phone]  Agreed to accept transfer and to provide appropriate medical treatment as acknowledged by       Dr.Mariam Castro  Appropriate medical records of the examination and treatment of the patient are provided at the time of transfer   STAFF INITIAL WHEN COMPLETED _IK______  Transfer will be performed by qualified personnel from [facility]  and appropriate transfer equipment as required, including the use of necessary and appropriate life support measures.    Provider Certification: I have  examined the patient and explained the following risks and benefits of being transferred/refusing transfer to the patient/family:  The patient is stable for psychiatric transfer because they are medically stable, and is protected from harming him/herself or others during transport      Based on these reasonable risks and benefits to the patient and/or the unborn child(yeni), and based upon the information available at the time of the patient’s examination, I certify that the medical benefits reasonably to be expected from the provision of appropriate medical treatments at another medical facility outweigh the increasing risks, if any, to the individual’s medical condition, and in the case of labor to the unborn child, from effecting the transfer.    X____________________________________________ DATE 03/22/24        TIME_______      ORIGINAL - SEND TO MEDICAL RECORDS   COPY - SEND WITH PATIENT DURING TRANSFER   cane

## 2025-02-18 ENCOUNTER — TELEPHONE (OUTPATIENT)
Age: 55
End: 2025-02-18

## 2025-02-18 ENCOUNTER — APPOINTMENT (EMERGENCY)
Dept: RADIOLOGY | Facility: HOSPITAL | Age: 55
End: 2025-02-18
Payer: COMMERCIAL

## 2025-02-18 ENCOUNTER — HOSPITAL ENCOUNTER (EMERGENCY)
Facility: HOSPITAL | Age: 55
Discharge: HOME | End: 2025-02-18
Attending: EMERGENCY MEDICINE | Admitting: EMERGENCY MEDICINE
Payer: COMMERCIAL

## 2025-02-18 VITALS
HEART RATE: 93 BPM | SYSTOLIC BLOOD PRESSURE: 136 MMHG | OXYGEN SATURATION: 96 % | RESPIRATION RATE: 22 BRPM | TEMPERATURE: 97.6 F | DIASTOLIC BLOOD PRESSURE: 85 MMHG

## 2025-02-18 DIAGNOSIS — R07.9 CHEST PAIN, UNSPECIFIED: Primary | ICD-10-CM

## 2025-02-18 LAB
ALBUMIN SERPL BCG-MCNC: 4.3 G/DL (ref 3.5–5)
ALP SERPL-CCNC: 91 U/L (ref 34–104)
ALT SERPL W P-5'-P-CCNC: 25 U/L (ref 7–52)
ANION GAP SERPL CALCULATED.3IONS-SCNC: 11 MMOL/L (ref 4–13)
AST SERPL W P-5'-P-CCNC: 20 U/L (ref 13–39)
BASOPHILS # BLD AUTO: 0.03 THOUSANDS/ΜL (ref 0–0.1)
BASOPHILS NFR BLD AUTO: 0 % (ref 0–1)
BILIRUB SERPL-MCNC: 0.22 MG/DL (ref 0.2–1)
BUN SERPL-MCNC: 4 MG/DL (ref 5–25)
CALCIUM SERPL-MCNC: 9.3 MG/DL (ref 8.4–10.2)
CARDIAC TROPONIN I PNL SERPL HS: 4 NG/L (ref ?–50)
CHLORIDE SERPL-SCNC: 100 MMOL/L (ref 96–108)
CO2 SERPL-SCNC: 24 MMOL/L (ref 21–32)
CREAT SERPL-MCNC: 0.57 MG/DL (ref 0.6–1.3)
D DIMER PPP FEU-MCNC: <0.27 UG/ML FEU
EOSINOPHIL # BLD AUTO: 0.3 THOUSAND/ΜL (ref 0–0.61)
EOSINOPHIL NFR BLD AUTO: 2 % (ref 0–6)
ERYTHROCYTE [DISTWIDTH] IN BLOOD BY AUTOMATED COUNT: 14.6 % (ref 11.6–15.1)
GFR SERPL CREATININE-BSD FRML MDRD: 106 ML/MIN/1.73SQ M
GLUCOSE SERPL-MCNC: 89 MG/DL (ref 65–140)
HCT VFR BLD AUTO: 42.8 % (ref 34.8–46.1)
HGB BLD-MCNC: 13.5 G/DL (ref 11.5–15.4)
IMM GRANULOCYTES # BLD AUTO: 0.09 THOUSAND/UL (ref 0–0.2)
IMM GRANULOCYTES NFR BLD AUTO: 1 % (ref 0–2)
LIPASE SERPL-CCNC: 13 U/L (ref 11–82)
LYMPHOCYTES # BLD AUTO: 1.79 THOUSANDS/ΜL (ref 0.6–4.47)
LYMPHOCYTES NFR BLD AUTO: 13 % (ref 14–44)
MCH RBC QN AUTO: 29.7 PG (ref 26.8–34.3)
MCHC RBC AUTO-ENTMCNC: 31.5 G/DL (ref 31.4–37.4)
MCV RBC AUTO: 94 FL (ref 82–98)
MONOCYTES # BLD AUTO: 0.71 THOUSAND/ΜL (ref 0.17–1.22)
MONOCYTES NFR BLD AUTO: 5 % (ref 4–12)
NEUTROPHILS # BLD AUTO: 10.64 THOUSANDS/ΜL (ref 1.85–7.62)
NEUTS SEG NFR BLD AUTO: 79 % (ref 43–75)
NRBC BLD AUTO-RTO: 0 /100 WBCS
PLATELET # BLD AUTO: 220 THOUSANDS/UL (ref 149–390)
PMV BLD AUTO: 8.8 FL (ref 8.9–12.7)
POTASSIUM SERPL-SCNC: 4.2 MMOL/L (ref 3.5–5.3)
PROT SERPL-MCNC: 7 G/DL (ref 6.4–8.4)
RBC # BLD AUTO: 4.55 MILLION/UL (ref 3.81–5.12)
SODIUM SERPL-SCNC: 135 MMOL/L (ref 135–147)
WBC # BLD AUTO: 13.56 THOUSAND/UL (ref 4.31–10.16)

## 2025-02-18 PROCEDURE — 36415 COLL VENOUS BLD VENIPUNCTURE: CPT | Performed by: EMERGENCY MEDICINE

## 2025-02-18 PROCEDURE — 85025 COMPLETE CBC W/AUTO DIFF WBC: CPT | Performed by: EMERGENCY MEDICINE

## 2025-02-18 PROCEDURE — 85379 FIBRIN DEGRADATION QUANT: CPT | Performed by: EMERGENCY MEDICINE

## 2025-02-18 PROCEDURE — 96361 HYDRATE IV INFUSION ADD-ON: CPT

## 2025-02-18 PROCEDURE — 83690 ASSAY OF LIPASE: CPT | Performed by: EMERGENCY MEDICINE

## 2025-02-18 PROCEDURE — 93005 ELECTROCARDIOGRAM TRACING: CPT

## 2025-02-18 PROCEDURE — 99285 EMERGENCY DEPT VISIT HI MDM: CPT

## 2025-02-18 PROCEDURE — 96360 HYDRATION IV INFUSION INIT: CPT

## 2025-02-18 PROCEDURE — 84484 ASSAY OF TROPONIN QUANT: CPT | Performed by: EMERGENCY MEDICINE

## 2025-02-18 PROCEDURE — 80053 COMPREHEN METABOLIC PANEL: CPT | Performed by: EMERGENCY MEDICINE

## 2025-02-18 PROCEDURE — 99285 EMERGENCY DEPT VISIT HI MDM: CPT | Performed by: EMERGENCY MEDICINE

## 2025-02-18 PROCEDURE — 71045 X-RAY EXAM CHEST 1 VIEW: CPT

## 2025-02-18 RX ORDER — ACETAMINOPHEN 325 MG/1
975 TABLET ORAL ONCE
Status: COMPLETED | OUTPATIENT
Start: 2025-02-18 | End: 2025-02-18

## 2025-02-18 RX ADMIN — SODIUM CHLORIDE 1000 ML: 0.9 INJECTION, SOLUTION INTRAVENOUS at 10:27

## 2025-02-18 RX ADMIN — ACETAMINOPHEN 975 MG: 325 TABLET ORAL at 10:49

## 2025-02-18 NOTE — TELEPHONE ENCOUNTER
[facility] called stating the patient has an upcoming appt 2/28    They asked the following information be faxed to their office    Latest Office Notes  Demographics  Imaging Results related to the back pain. NOT CXR    Fax [phone]
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
## 2025-02-20 LAB
ATRIAL RATE: 105 BPM
P AXIS: 70 DEGREES
PR INTERVAL: 144 MS
QRS AXIS: 13 DEGREES
QRSD INTERVAL: 70 MS
QT INTERVAL: 372 MS
QTC INTERVAL: 491 MS
T WAVE AXIS: 55 DEGREES
VENTRICULAR RATE: 105 BPM

## 2025-02-20 PROCEDURE — 93010 ELECTROCARDIOGRAM REPORT: CPT | Performed by: INTERNAL MEDICINE

## 2025-02-24 ENCOUNTER — TELEPHONE (OUTPATIENT)
Age: 55
End: 2025-02-24

## 2025-02-24 DIAGNOSIS — B37.0 THRUSH: Primary | ICD-10-CM

## 2025-02-24 DIAGNOSIS — R19.8 GI SYMPTOMS: ICD-10-CM

## 2025-02-24 RX ORDER — ONDANSETRON 4 MG/1
4 TABLET, FILM COATED ORAL EVERY 8 HOURS PRN
Qty: 20 TABLET | Refills: 0 | Status: SHIPPED | OUTPATIENT
Start: 2025-02-24

## 2025-02-24 NOTE — TELEPHONE ENCOUNTER
Patient called again stating she also has Thrush.     Asking Dr. Lew to send in script    [facility] [place]

## 2025-02-24 NOTE — TELEPHONE ENCOUNTER
Left voice message asking pt to call triage back.     Patient called in stating she has been experiencing throwing up, fever and diarrhea with body aches for past couple days. Called office as there were no same days and was told to send a message over to see if provider will call her something in for symptoms. If something is called in please send to [facility] in [place]. Please advise and call patient back

## 2025-02-24 NOTE — TELEPHONE ENCOUNTER
Attempted to contact patient; left message for patient to call back and confirm location. Oral thrush or vaginal yeast?

## 2025-02-25 RX ORDER — CLOTRIMAZOLE 10 MG/1
10 LOZENGE ORAL
Qty: 25 TROCHE | Refills: 0 | Status: SHIPPED | OUTPATIENT
Start: 2025-02-25

## 2025-02-25 NOTE — TELEPHONE ENCOUNTER
Patient calling back to confirm that she is talking about oral thrush as her complaint and not vaginal yeast. Patient would like a medication called into [facility] [place] for thrush. Please advise.

## 2025-03-06 NOTE — TELEPHONE ENCOUNTER
Phone call from patient stating she still has thrush and states the Mycelex trouche is not helping and is asking if what she took before the Nystatin oral suspension 500,000 units( swish & swallow could be sent in as she states her throat is really hurting her.  Please advise. Thank you.

## 2025-03-07 NOTE — TELEPHONE ENCOUNTER
Patient called to follow up on request.  Relayed PCP recommendation.  Patient expressed understanding.

## 2025-03-17 ENCOUNTER — NURSE TRIAGE (OUTPATIENT)
Age: 55
End: 2025-03-17

## 2025-03-17 DIAGNOSIS — R09.81 CONGESTION OF NASAL SINUS: ICD-10-CM

## 2025-03-17 DIAGNOSIS — K21.9 GASTROESOPHAGEAL REFLUX DISEASE WITHOUT ESOPHAGITIS: ICD-10-CM

## 2025-03-17 DIAGNOSIS — E53.8 VITAMIN B12 DEFICIENCY: ICD-10-CM

## 2025-03-17 RX ORDER — MULTIVITAMIN WITH IRON
500 TABLET ORAL DAILY
Qty: 90 TABLET | Refills: 1 | Status: SHIPPED | OUTPATIENT
Start: 2025-03-17

## 2025-03-17 RX ORDER — FLUTICASONE PROPIONATE 50 MCG
1 SPRAY, SUSPENSION (ML) NASAL DAILY
Qty: 48 G | Refills: 1 | Status: SHIPPED | OUTPATIENT
Start: 2025-03-17

## 2025-03-17 RX ORDER — PANTOPRAZOLE SODIUM 40 MG/1
40 TABLET, DELAYED RELEASE ORAL
Qty: 90 TABLET | Refills: 1 | Status: SHIPPED | OUTPATIENT
Start: 2025-03-17

## 2025-03-17 NOTE — TELEPHONE ENCOUNTER
"Pt reports she was seen/treated in the ER for chest pain. Pt states she still is having this issue since being treated by the ER doctor, but nothing has worsened.   Pt denies chest pain right now, no SOB, and no numbness/tingling at this time.Triage nurse advised pt to go to the ER, if anything did develop or worsen. Pt states the chest pain come and goes since visit in the ER.     Pt states she needs lipid panel done and that she wasn't fasting for the one they obtained in the ER. Triage nurse also offered pt an ER-f/u Appt, but pt refused at this time. Pt states\" she wants her labs done first and then she will make an appt\". Pt asked nurse if she is supposed to fast for this appt and triage nurse advised pt the lipid panel labs in chart states \"pt should fast for 10+hrs\".     PCP please call pt back to further advise.      "

## 2025-03-17 NOTE — TELEPHONE ENCOUNTER
Pt called in requesting the following medications to be refilled and a medication to be prescribed for acid reflux.  Please review and advise:    Medication: Flonase 50mcg/act nasal spray    Dose/Frequency: 1 spray into each nostril daily    Quantity: 16g Refills 0    Pharmacy: [facility]    Office:   [x] PCP/Provider -   [] Speciality/Provider -     Does the patient have enough for 3 days?   [x] Yes   [] No - Send as HP to POD    ____________________    Medication: Vitamin B12 500mcg tablet    Dose/Frequency: Take 1 tablet by mouth daily    Quantity: 30 Refills 5    Pharmacy: [facility]    Office:   [x] PCP/Provider -   [] Speciality/Provider -     Does the patient have enough for 3 days?   [x] Yes   [] No - Send as HP to POD

## 2025-03-17 NOTE — TELEPHONE ENCOUNTER
Spoke to patient to set up an apt to discuss her cholesterol and she states will call back and hung up phone

## 2025-03-19 ENCOUNTER — HOSPITAL ENCOUNTER (EMERGENCY)
Facility: HOSPITAL | Age: 55
Discharge: HOME | End: 2025-03-19
Attending: FAMILY MEDICINE | Admitting: FAMILY MEDICINE
Payer: COMMERCIAL

## 2025-03-19 VITALS
RESPIRATION RATE: 20 BRPM | DIASTOLIC BLOOD PRESSURE: 88 MMHG | TEMPERATURE: 97.6 F | OXYGEN SATURATION: 92 % | SYSTOLIC BLOOD PRESSURE: 136 MMHG | HEART RATE: 112 BPM

## 2025-03-19 DIAGNOSIS — Z00.8 ENCOUNTER FOR PSYCHOLOGICAL EVALUATION: Primary | ICD-10-CM

## 2025-03-19 DIAGNOSIS — F22 PARANOID (HCC): ICD-10-CM

## 2025-03-19 LAB
AMPHETAMINES SERPL QL SCN: NEGATIVE
BARBITURATES UR QL: NEGATIVE
BENZODIAZ UR QL: NEGATIVE
BILIRUB UR QL STRIP: NEGATIVE
CLARITY UR: CLEAR
COCAINE UR QL: NEGATIVE
COLOR UR: YELLOW
ETHANOL EXG-MCNC: 0.07 MG/DL
ETHANOL EXG-MCNC: 0.09 MG/DL
FENTANYL UR QL SCN: NEGATIVE
GLUCOSE UR STRIP-MCNC: NEGATIVE MG/DL
HGB UR QL STRIP.AUTO: NEGATIVE
HYDROCODONE UR QL SCN: NEGATIVE
KETONES UR STRIP-MCNC: NEGATIVE MG/DL
LEUKOCYTE ESTERASE UR QL STRIP: NEGATIVE
METHADONE UR QL: NEGATIVE
NITRITE UR QL STRIP: NEGATIVE
OPIATES UR QL SCN: NEGATIVE
OXYCODONE+OXYMORPHONE UR QL SCN: POSITIVE
PCP UR QL: NEGATIVE
PH UR STRIP.AUTO: 6 [PH]
PROT UR STRIP-MCNC: NEGATIVE MG/DL
SP GR UR STRIP.AUTO: <=1.005
THC UR QL: NEGATIVE
UROBILINOGEN UR QL STRIP.AUTO: 0.2 E.U./DL

## 2025-03-19 PROCEDURE — 99284 EMERGENCY DEPT VISIT MOD MDM: CPT

## 2025-03-19 PROCEDURE — 81003 URINALYSIS AUTO W/O SCOPE: CPT | Performed by: FAMILY MEDICINE

## 2025-03-19 PROCEDURE — 82075 ASSAY OF BREATH ETHANOL: CPT | Performed by: FAMILY MEDICINE

## 2025-03-19 PROCEDURE — 80307 DRUG TEST PRSMV CHEM ANLYZR: CPT | Performed by: FAMILY MEDICINE

## 2025-03-19 PROCEDURE — 93005 ELECTROCARDIOGRAM TRACING: CPT

## 2025-03-19 PROCEDURE — 99285 EMERGENCY DEPT VISIT HI MDM: CPT | Performed by: FAMILY MEDICINE

## 2025-03-19 NOTE — ED NOTES
Patient refusing blood work at this time. Dr. Devine at bedside and spoke with patient; okay to hold off on blood work at this time.      Medina Cui RN  03/19/25 1590

## 2025-03-19 NOTE — ED NOTES
CIS met with pt to complete crisis intake and safety risk assessment. Introduce self, role, and evaluation process. Pt presents in ED via EMS for an evaluation.  Pt stated she had an anxiety attack and took  her normal doses of medication and then had a few shots of alcohol  to calm down.  She is on Ativan 1 mg 2x a day. Last inpatient psychiatric admission was 1/2025.   Pt has a history of schizophrenia.  She receives individual therapy, and psychiatric services through [facility]. Pt stated she has a psychiatric appointment tomorrow.  She denies SI, HI, AVH or thoughts to self harm. Pt also denies drug use.  CIS discuss treatment plan.  Pt agrees to keep her appointment with psychiatry.  Pt denies not meet 201 criteria.

## 2025-03-19 NOTE — ED NOTES
This writer discussed the patients current presentation and recommended discharge plan with .  They agree with the patient being discharged at this time.     The patient was Instructed to follow up with their [facility].  Appointment is scheduled for 3/202/25.     This writer discussed discharge plans with the patient who agrees with and understands the discharge plans.
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
## 2025-03-20 LAB
ATRIAL RATE: 111 BPM
P AXIS: 69 DEGREES
PR INTERVAL: 158 MS
QRS AXIS: 33 DEGREES
QRSD INTERVAL: 70 MS
QT INTERVAL: 344 MS
QTC INTERVAL: 467 MS
T WAVE AXIS: 62 DEGREES
VENTRICULAR RATE: 111 BPM

## 2025-03-20 PROCEDURE — 93010 ELECTROCARDIOGRAM REPORT: CPT | Performed by: INTERNAL MEDICINE

## 2025-03-27 ENCOUNTER — OFFICE VISIT (OUTPATIENT)
Dept: FAMILY MEDICINE CLINIC | Facility: CLINIC | Age: 55
End: 2025-03-27
Payer: COMMERCIAL

## 2025-03-27 VITALS
WEIGHT: 152 LBS | HEIGHT: 61 IN | HEART RATE: 76 BPM | DIASTOLIC BLOOD PRESSURE: 68 MMHG | SYSTOLIC BLOOD PRESSURE: 130 MMHG | BODY MASS INDEX: 28.7 KG/M2 | RESPIRATION RATE: 16 BRPM | TEMPERATURE: 97.7 F

## 2025-03-27 DIAGNOSIS — R09.81 CONGESTION OF NASAL SINUS: ICD-10-CM

## 2025-03-27 DIAGNOSIS — F17.210 SMOKING GREATER THAN 20 PACK YEARS: ICD-10-CM

## 2025-03-27 DIAGNOSIS — B37.0 THRUSH: ICD-10-CM

## 2025-03-27 DIAGNOSIS — J01.01 ACUTE RECURRENT MAXILLARY SINUSITIS: ICD-10-CM

## 2025-03-27 DIAGNOSIS — F25.0 SCHIZOAFFECTIVE DISORDER, BIPOLAR TYPE (HCC): Primary | ICD-10-CM

## 2025-03-27 DIAGNOSIS — J40 BRONCHITIS: ICD-10-CM

## 2025-03-27 PROCEDURE — 99214 OFFICE O/P EST MOD 30 MIN: CPT | Performed by: FAMILY MEDICINE

## 2025-03-27 NOTE — PROGRESS NOTES
Name: Jo-Ann Lamb      : 1970      MRN: [id number]  Encounter Provider: Cipriano Lew DO  Encounter Date: 3/27/2025   Encounter department: [facility] PRIMARY CARE  :  Assessment & Plan  Schizoaffective disorder, bipolar type (HCC)  Under the care of behavioral health       Bronchitis  Patient complains of coughing up phlegm for months the plan will be to obtain a sputum sample and a chest x-ray.  Pulmonary function test of  reviewed is normal  Orders:  •  XR chest pa and lateral; Future  •  Mycoplasma Pneumoniae AB, IgG/IgM; Future  •  Sputum culture and Gram stain; Future    Acute recurrent maxillary sinusitis  Complains of sinus pressure and pain we will obtain an x-ray of the sinuses  Orders:  •  XR sinuses < 3 vw; Future    Congestion of nasal sinus         Smoking greater than 20 pack years         Thrush  Not apparent              History of Present Illness   Patient presents with complaint of cold-like symptoms for months.  Also complains of thrush though being treated x 2 with Mycelex    Sinusitis  Associated symptoms include shortness of breath. Pertinent negatives include no chills, coughing, ear pain or sore throat.   Shortness of Breath  Associated symptoms include wheezing. Pertinent negatives include no chest pain, coughing, palpitations or sore throat.   Wheezing  Associated symptoms include wheezing. Pertinent negatives include no chest pain, coughing, palpitations or sore throat.   Constipation  Pertinent negatives include no abdominal pain, back pain, fever or vomiting.     Review of Systems   Constitutional:  Negative for chills and fever.   HENT:  Positive for mouth sores. Negative for ear pain and sore throat.    Eyes:  Negative for pain and visual disturbance.   Respiratory:  Positive for shortness of breath and wheezing. Negative for cough.    Cardiovascular:  Negative for chest pain and palpitations.   Gastrointestinal:  Positive for constipation. Negative  "for abdominal pain and vomiting.   Genitourinary:  Negative for dysuria and hematuria.   Musculoskeletal:  Negative for arthralgias and back pain.   Skin:  Negative for color change and rash.   Neurological:  Negative for seizures and syncope.   All other systems reviewed and are negative.      Objective   /68   Pulse 76   Temp 97.7 °F (36.5 °C)   Resp 16   Ht 5' 1\" (1.549 m)   Wt 68.9 kg (152 lb)   LMP 01/01/2020   BMI 28.72 kg/m²      Physical Exam  Constitutional:       Appearance: Normal appearance.   HENT:      Head: Normocephalic and atraumatic.      Right Ear: Tympanic membrane, ear canal and external ear normal.      Left Ear: Tympanic membrane, ear canal and external ear normal.      Nose: Nose normal.      Mouth/Throat:      Mouth: Mucous membranes are moist.      Pharynx: Oropharynx is clear.   Eyes:      Extraocular Movements: Extraocular movements intact.      Conjunctiva/sclera: Conjunctivae normal.      Pupils: Pupils are equal, round, and reactive to light.   Cardiovascular:      Rate and Rhythm: Normal rate and regular rhythm.      Pulses: Normal pulses.      Heart sounds: Normal heart sounds.   Pulmonary:      Effort: Pulmonary effort is normal.      Breath sounds: Normal breath sounds.   Abdominal:      General: Abdomen is flat. Bowel sounds are normal.      Palpations: Abdomen is soft.   Musculoskeletal:         General: Normal range of motion.      Cervical back: Normal range of motion and neck supple.   Skin:     General: Skin is warm and dry.   Neurological:      General: No focal deficit present.      Mental Status: She is alert and oriented to person, place, and time.   Psychiatric:         Mood and Affect: Mood normal.         Behavior: Behavior normal.         "

## 2025-03-31 ENCOUNTER — TELEPHONE (OUTPATIENT)
Age: 55
End: 2025-03-31

## 2025-03-31 ENCOUNTER — APPOINTMENT (OUTPATIENT)
Dept: LAB | Facility: CLINIC | Age: 55
End: 2025-03-31
Payer: COMMERCIAL

## 2025-03-31 DIAGNOSIS — F10.90 ALCOHOL USE: ICD-10-CM

## 2025-03-31 DIAGNOSIS — Z79.899 ENCOUNTER FOR LONG-TERM (CURRENT) USE OF MEDICATIONS: ICD-10-CM

## 2025-03-31 DIAGNOSIS — J40 BRONCHITIS: ICD-10-CM

## 2025-03-31 DIAGNOSIS — K59.04 CHRONIC IDIOPATHIC CONSTIPATION: ICD-10-CM

## 2025-03-31 LAB
BASOPHILS # BLD AUTO: 0.04 THOUSANDS/ÂΜL (ref 0–0.1)
BASOPHILS NFR BLD AUTO: 0 % (ref 0–1)
EOSINOPHIL # BLD AUTO: 0.24 THOUSAND/ÂΜL (ref 0–0.61)
EOSINOPHIL NFR BLD AUTO: 2 % (ref 0–6)
ERYTHROCYTE [DISTWIDTH] IN BLOOD BY AUTOMATED COUNT: 14.4 % (ref 11.6–15.1)
HCT VFR BLD AUTO: 38.2 % (ref 34.8–46.1)
HGB BLD-MCNC: 12.6 G/DL (ref 11.5–15.4)
IMM GRANULOCYTES # BLD AUTO: 0.07 THOUSAND/UL (ref 0–0.2)
IMM GRANULOCYTES NFR BLD AUTO: 1 % (ref 0–2)
LYMPHOCYTES # BLD AUTO: 2.32 THOUSANDS/ÂΜL (ref 0.6–4.47)
LYMPHOCYTES NFR BLD AUTO: 20 % (ref 14–44)
MCH RBC QN AUTO: 30.5 PG (ref 26.8–34.3)
MCHC RBC AUTO-ENTMCNC: 33 G/DL (ref 31.4–37.4)
MCV RBC AUTO: 93 FL (ref 82–98)
MONOCYTES # BLD AUTO: 0.6 THOUSAND/ÂΜL (ref 0.17–1.22)
MONOCYTES NFR BLD AUTO: 5 % (ref 4–12)
NEUTROPHILS # BLD AUTO: 8.18 THOUSANDS/ÂΜL (ref 1.85–7.62)
NEUTS SEG NFR BLD AUTO: 72 % (ref 43–75)
NRBC BLD AUTO-RTO: 0 /100 WBCS
PLATELET # BLD AUTO: 289 THOUSANDS/UL (ref 149–390)
PMV BLD AUTO: 9.7 FL (ref 8.9–12.7)
RBC # BLD AUTO: 4.13 MILLION/UL (ref 3.81–5.12)
WBC # BLD AUTO: 11.45 THOUSAND/UL (ref 4.31–10.16)

## 2025-03-31 PROCEDURE — 86738 MYCOPLASMA ANTIBODY: CPT

## 2025-03-31 PROCEDURE — 85025 COMPLETE CBC W/AUTO DIFF WBC: CPT

## 2025-03-31 PROCEDURE — 87070 CULTURE OTHR SPECIMN AEROBIC: CPT

## 2025-03-31 PROCEDURE — 36415 COLL VENOUS BLD VENIPUNCTURE: CPT

## 2025-03-31 PROCEDURE — 87205 SMEAR GRAM STAIN: CPT

## 2025-03-31 NOTE — TELEPHONE ENCOUNTER
Pt called asking for her After Visit Summary to please be mailed to her house.  Confirmed pt's address on file.  No further questions or concerns at this time.

## 2025-04-01 LAB
M PNEUMO IGG SER IA-ACNC: 265 U/ML (ref 0–99)
M PNEUMO IGM SER IA-ACNC: <770 U/ML (ref 0–769)

## 2025-04-01 RX ORDER — METHION/INOS/CHOL BT/B COM/LIV 110MG-86MG
100 CAPSULE ORAL DAILY
Qty: 90 TABLET | Refills: 1 | Status: SHIPPED | OUTPATIENT
Start: 2025-04-01

## 2025-04-01 RX ORDER — POLYETHYLENE GLYCOL 3350 17 G/17G
POWDER, FOR SOLUTION ORAL
Qty: 510 G | Refills: 0 | Status: SHIPPED | OUTPATIENT
Start: 2025-04-01

## 2025-04-02 LAB
BACTERIA SPT RESP CULT: ABNORMAL
GRAM STN SPEC: ABNORMAL

## 2025-04-09 ENCOUNTER — TELEPHONE (OUTPATIENT)
Age: 55
End: 2025-04-09

## 2025-04-09 DIAGNOSIS — J02.9 PHARYNGITIS, UNSPECIFIED ETIOLOGY: Primary | ICD-10-CM

## 2025-04-09 NOTE — TELEPHONE ENCOUNTER
Pt called, asking if pcp has had a chance to review her lab results from 3/31. Stated her throat is still a little sore. Please advise

## 2025-04-10 RX ORDER — AZITHROMYCIN 250 MG/1
TABLET, FILM COATED ORAL
Qty: 6 TABLET | Refills: 0 | Status: SHIPPED | OUTPATIENT
Start: 2025-04-10 | End: 2025-04-15

## 2025-04-10 NOTE — TELEPHONE ENCOUNTER
Referral placed and patient notified    Virtual Visit Details    Type of service:  Video Visit   Video Start Time: 10:29 AM  Video End Time: 1100    Originating Location (pt. Location): Home    Distant Location (provider location):  On-site  Platform used for Video Visit: [facility]    Palliative Care Outpatient Clinic Progress Note    Patient Name: Akila Monte  Primary Provider: Issac Campbell    Impression & Recommendations & Counseling:  Akila Monte is a 50 year old female with history of CF, s/p lung transplant and now with anal cancer and has completed  XRT treatments and she has chemorads.  She was diagnosed with lumbosacral plexitis in 2023.  She has had about 8 solumedrol infusions which seem to be helping especially her drop foot and she has progressed from needing an AFO like brace and using walking sticks and now walking on a treadmill.  She also has some return of sensation in her foot.     ECO  Decisional Capacity: very present     Anal carcinoma and has completed  XRT sessions with weekly chemo and tolerated it well.  No further tenesmus cancer associated pain  CF  S/p bilateral lung transplant  GERD  lumbosacral plexitis  Likely patellofemoral syndrome from deconditioning      Plan:  OK to stay off Butrans as long as symptoms are well controlled.  OK to use tylenol 500-1000 mg po QID prn moderate pain  OK to use Voltaren gel up to QID prn  Keep working with PT team   Continue seeing [facility] at [facility] as long as it continues to be helpful     F/up in 3 months, sooner prn worsening symptoms     Counseling: All of the above was explained to the patient in lay language. The patient has verbalized a clear understanding of the discussion, asked appropriate questions, which have been answered to patient's apparent satisfaction. The patient is in agreement with the above plan.        Chief Complaint/Patient ID: Akila Monte 50 year old female with PMHx of CF, s/p lung transplant,  diabetes, recurrent sinusitis and anal cancer who completed chemorads treatments last spring.  She had been doing well and had worsening buttock and leg pain bilat over 10-14 days that resulted in an acute hospitalization for pain control and now outpatient follow up.  She is working with Dr. Granger and outpatient therapies.  She was diagnosed in late 2023 with lumbosacral plexitis especially on the left.  A neurologist at [facility] is now guiding her treatment.    Last Palliative care appointment: 08/27/2025 with me     Reviewed:  Yes:   reviewed - no record of controlled substances prescribed..    Interim History:  Akila Monte is a 50 year old female who is seen today for follow up with Palliative Care via billable video visit.      Pain:  none though some pins and needle feeling in left foot from neuropathy post XRT    Appetite/Nausea: no concerns     Bowels: no concerns     Sleep: no worries     Mood: denies depression or anxiety     Coping:  no concerns though feels overwhelmed from time to time due to frequency of appointments    Family History- Reviewed in Epic.    Allergies   Allergen Reactions    Levofloxacin Shortness Of Breath     Had 2 times after first dose of Levofloxacine breathing problems and needed I.v. Benadryl    Oxycodone      She was very nauseas/Drowsy with poor pain control, including oxycontin    Cefuroxime Other (See Comments)     Joint swelling    Compazine [Prochlorperazine] Other (See Comments)     Anxiety kicking and thrashing in bed    External Allergen Needs Reconciliation - See Comment      Please reconcile the Patient's allergy reported as LEAD ACETATEMORPHINE SULFATE and update accordingly    Morphine Nausea     Nausea     Piperacillin Hives    Tobramycin Sulfate      Vestibular toxicity    Vfend [Voriconazole]      Elevated LFTs    Bactrim [Sulfamethoxazole W/Trimethoprim] Nausea     With IV Bactrim only - tolerates the single strength three times weekly        Social  History:  Pertinent changes to social history/social situation since last visit: none  Key support resources:  Bharath  Advance Directive Status:  ACP documents in Epic    Social History     Tobacco Use    Smoking status: Never     Passive exposure: Never    Smokeless tobacco: Never   Vaping Use    Vaping status: Never Used   Substance Use Topics    Alcohol use: Yes     Comment: Social    Drug use: No         Allergies   Allergen Reactions    Levofloxacin Shortness Of Breath     Had 2 times after first dose of Levofloxacine breathing problems and needed I.v. Benadryl    Oxycodone      She was very nauseas/Drowsy with poor pain control, including oxycontin    Cefuroxime Other (See Comments)     Joint swelling    Compazine [Prochlorperazine] Other (See Comments)     Anxiety kicking and thrashing in bed    External Allergen Needs Reconciliation - See Comment      Please reconcile the Patient's allergy reported as LEAD ACETATEMORPHINE SULFATE and update accordingly    Morphine Nausea     Nausea     Piperacillin Hives    Tobramycin Sulfate      Vestibular toxicity    Vfend [Voriconazole]      Elevated LFTs    Bactrim [Sulfamethoxazole W/Trimethoprim] Nausea     With IV Bactrim only - tolerates the single strength three times weekly      Current Outpatient Medications   Medication Sig Dispense Refill    acetaminophen (TYLENOL) 650 MG CR tablet Take 1 tablet (650 mg) by mouth every 8 hours as needed for mild pain or fever 200 tablet 3    beta carotene 40606 UNIT capsule TAKE ONE CAPSULE BY MOUTH ONCE DAILY 100 capsule 3    blood glucose monitoring (JOANA MICROLET) lancets Use to test blood sugar 8 times daily. 720 each 3    calcium carbonate-vitamin D (CALTRATE) 600-10 MG-MCG per tablet TAKE ONE TABLET BY MOUTH TWICE A DAY WITH MEALS 60 tablet 11    carboxymethylcellulose PF (REFRESH PLUS) 0.5 % ophthalmic solution Place 1 drop into the right eye 4 times daily 70 each 0    carvedilol (COREG) 3.125 MG tablet Take 2  tablets (6.25 mg) by mouth daily (with breakfast) AND 1 tablet (3.125 mg) every evening.      Continuous Blood Gluc Transmit (DEXCOM G6 TRANSMITTER) MISC 1 each every 3 months 1 each 3    Continuous Glucose  (DEXCOM G7 ) EVITA Use to read blood sugars as per 's instructions. 1 each 0    Continuous Glucose Sensor (DEXCOM G7 SENSOR) MISC CHANGE EVERY 10 DAYS. 3 each 3    CONTOUR NEXT TEST test strip Use to test blood sugar 4 times daily or as directed. 120 strip 3    diclofenac (VOLTAREN) 1 % topical gel Apply 2 g topically 4 times daily 150 g 3    EPINEPHrine (ANY BX GENERIC EQUIV) 0.3 MG/0.3ML injection 2-pack INJECT 0.3ML INTRAMUSCULARLY ONCE AS NEEDED FOR ALLERGIC REACTION 2 each 11    estradiol (ESTRACE) 0.1 MG/GM vaginal cream Apply a pea-sized amount topically to affected area 2-3 times a week. 42.5 g 11    estradiol (VAGIFEM) 10 MCG TABS vaginal tablet Place 1 tablet (10 mcg) vaginally twice a week 24 tablet 3    ferrous sulfate (FEROSUL) 325 (65 Fe) MG tablet TAKE ONE TABLET BY MOUTH ONCE DAILY 30 tablet 11    Fexofenadine HCl (ALLEGRA PO) Take 180 mg by mouth every evening      fluticasone (FLONASE) 50 MCG/ACT nasal spray SPRAY TWO SPRAYS IN EACH NOSTRIL ONCE DAILY AS NEEDED FOR RHINITIS OR ALLERGIES 15.8 mL 4    Glucagon (BAQSIMI) 3 MG/DOSE nasal powder Spray 1 spray (3 mg) in nostril as needed for low blood sugar. in the event of unconscious hypoglycemia or hypoglycemic seizure. May repeat dose if no response after 15 minutes. 1 each 1    GVOKE HYPOPEN 1-PACK 1 MG/0.2ML pen INJECT CONTENTS OF ONE SYRINGE UNDER THE SKIN AS NEEDED FOR SEVERE HYPOGLYCEMIA. 0.2 mL 5    Insulin Aspart, w/Niacinamide, (FIASP PENFILL) 100 UNIT/ML SOCT 80 Units by Tube route daily Use in pump up to 80 units daily 75 mL 3    INSULIN BASAL RATE FOR INPATIENT AMBULATORY PUMP Vial to fill pump: NOVOLOG 0.4 units per hour 0800 - 0000. NO basal insulin 0000 - 0800. 1 Month 12    insulin bolus from AMBULATORY  PUMP Inject Subcutaneous Take with snacks or supplements (with snacks) Insulin dose = 1 units for 13 grams of carbohydrate 1 Month 12    Insulin Disposable Pump (OMNIPOD 5 PODS, GEN 5,) MISC 1 each every 48 hours. 45 each 4    JANTOVEN ANTICOAGULANT 2 MG tablet TAKE TWO OR TWO AND ONE-HALF TABLETS BY MOUTH DAILY OR AS DIRECTED 75 tablet 3    lipase-protease-amylase (CREON) 92186-47862-55229 units CPEP TAKE ONE TO THREE CAPSULES BY MOUTH WITH EACH MEAL AND ONE CAPSULE WITH EACH SNACK (TOTAL OF 3 MEALS AND 3 SNACKS PER DAY) 500 capsule 5    magnesium oxide (MAG-OX) 400 MG tablet TAKE TWO TABLETS BY MOUTH THREE TIMES A  tablet 11    meropenem (MERREM) 500 MG vial 50 mLs (500 mg) as needed Nasal installation, once approximately every 2 months per ENT. 50 mL 0    Microlet Lancets MISC Use to test blood sugar 4 daily. 100 each 4    mvw complete formulation (SOFTGELS) capsule TAKE ONE CAPSULE BY MOUTH ONCE DAILY 60 capsule 11    NONFORMULARY Gruns OTC gummie. Pt taking 8 gummies daily      ondansetron (ZOFRAN ODT) 8 MG ODT tab Take 1 tablet (8 mg) by mouth every 8 hours as needed for nausea 30 tablet 2    predniSONE (DELTASONE) 2.5 MG tablet TAKE ONE TABLET BY MOUTH TWICE A DAY 60 tablet 11    predniSONE (DELTASONE) 5 MG tablet Take 1 tablet (5 mg) by mouth daily. Take 4 tabs daily for 1 week, then take 3 tabs daily for 1 week 49 tablet 1    PROTONIX 40 MG EC tablet TAKE ONE TABLET BY MOUTH TWICE A  tablet 3    sodium chloride (DEEP SEA NASAL SPRAY) 0.65 % nasal spray INSTILL 1 TO 2 SPRAYS IN EACH NOSTRIL EVERY HOURS AS NEEDED 44 mL 11    sulfamethoxazole-trimethoprim (BACTRIM) 400-80 MG tablet TAKE 1 TABLET BY MOUTH THREE TIMES A WEEK 15 tablet 11    tacrolimus (GENERIC) 1 mg/mL suspension Take 2.6 mLs (2.6 mg) by mouth every morning AND 2.6 mLs (2.6 mg) every evening. 160 mL 11    ursodiol (ACTIGALL) 300 MG capsule TAKE ONE CAPSULE BY MOUTH TWICE A  capsule 3    vitamin C (ASCORBIC ACID) 500 MG  tablet TAKE ONE TABLET BY MOUTH TWICE A  tablet 3    vitamin D2 (ERGOCALCIFEROL) 18478 units (1250 mcg) capsule TAKE ONE CAPSULE BY MOUTH EVERY WEEK 5 capsule 11    warfarin ANTICOAGULANT (JANTOVEN ANTICOAGULANT) 1 MG tablet Take 3 mg Thurs and 4 mg all other days, or as directed by the Coumadin Clinic 325 tablet 1     Past Medical History:   Diagnosis Date    Abnormal Pap smear of cervix     ABPA (allergic bronchopulmonary aspergillosis) (H)     but no clinical response to previous course.     Aspergillus (H)     Elevated LFTs with Voriconazole in the past.  Use 100mg BID if required    Back injury 1995    Bacteremia associated with intravascular line 12/2006    Achromobacter xylosoxidans line sepsis from [place] in 12/2006    Cancer (H) 01/26/2023    Anal    Chronic infection     Chronic sinusitis     Clinical diagnosis of COVID-19 01/15/2022    CMV infection, acute (H) 12/26/2013    Primary infection after serodiscordant transplant    Cystic fibrosis with pulmonary manifestations (H) 11/18/2011    Diabetes (H)     Diabetes mellitus (H)     CFRD    DVT (deep venous thrombosis) (H) 08/2013    Right IJ, subclavian and innominate following lung transplantation    Gastro-oesophageal reflux disease     Gestational diabetes     History of human papillomavirus infection     History of lung transplant (H) 08/26/2013    complicated by acute kidney injury, hyperkalemia and DVT    History of Pseudomonas pneumonia     Hoarseness     Hypertension     Immunosuppression     Infectious disease     Influenza pneumonia 2004    Lung disease     MSSA (methicillin-susceptible Staphylococcus aureus) colonization 04/15/2014    Nasal polyps     Oxygen dependent     2 L occassonally    Pancreatic disease     insuff on enzymes    Pancreatic insufficiency     Pneumothorax 1991    Treated with chest tube (NO PLEURADESIS)    Rotaviral gastroenteritis 04/28/2019    Skin infection 08/23/2022    Toe infection    Steroid long-term use      Thrombosis     Transplant 08/27/2013    lungs    Varicella     Venous stenosis of left upper extremity     Left upper extremity Venography on 10/13/2009 showed subclavian vein narrowing. Failed lytics, hence angioplasty was done on the same setting. Anticoagulation for a total of 3 months. She is off Vitamin K but will continue AquaADEKs. There is a question of thoracic outlet syndrome was seen by Dr. Slater who recommended surgery, but given her severe lung disease plan was to try a conservative appro    Vestibular disorder     secondary to aminoglycosides     Past Surgical History:   Procedure Laterality Date    BRONCHOSCOPY  12/04/2013    BRONCHOSCOPY FLEXIBLE AND RIGID  09/04/2013    Procedure: BRONCHOSCOPY FLEXIBLE AND RIGID;;  Surgeon: Ivett Kingsley MD;  Location: [facility]    COLONOSCOPY N/A 11/14/2016    Procedure: COLONOSCOPY;  Surgeon: Adair Villaseñor MD;  Location: [facility]    COLONOSCOPY N/A 05/23/2022    Procedure: COLONOSCOPY;  Surgeon: Debi Newton MD;  Location: [facility] OR    COLPOSCOPY, BIOPSY, COMBINED N/A 1/24/2023    Procedure: Pelvic exam under anesthesia, colposcopy with cervical biopsies and endocervical curettage;  Surgeon: Joy Fong MD;  Location: [facility] OR    ENT SURGERY      EXAM UNDER ANESTHESIA ANUS N/A 1/24/2023    Procedure: EXAM UNDER ANESTHESIA, ANUS;  Surgeon: Rustam Lopez MD;  Location: [facility]    FULGURATE CONDYLOMA RECTUM N/A 1/24/2023    Procedure: FULGURATION, CONDYLOMA, RECTUM;  Surgeon: Rustam Lopez MD;  Location:  OR    HEAD & NECK SURGERY  9/15/21    IR CVC TUNNEL PLACEMENT > 5 YRS OF AGE  3/17/2023    IR CVC TUNNEL REMOVAL LEFT  7/25/2023    OPTICAL TRACKING SYSTEM ENDOSCOPIC SINUS SURGERY Bilateral 03/26/2018    Procedure: OPTICAL TRACKING SYSTEM ENDOSCOPIC SINUS SURGERY;  Stealth guided Bilateral maxillary antrostomy and right sphenoidotomy with cultures ;  Surgeon: Brigitte Flood MD;  Location: [facility]    port removal  10/13/2009    RESECT  FIRST RIB WITH SUBCLAVIAN VEIN PATCH  2014    Procedure: RESECT FIRST RIB WITH SUBCLAVIAN VEIN PATCH;  Surgeon: Portillo Slater MD;  Location: [facility]    RESECT FIRST RIB WITH SUBCLAVIAN VEIN PATCH  2014    Procedure: RESECT FIRST RIB WITH SUBCLAVIAN VEIN PATCH;  Surgeon: Portillo lSater MD;  Location: [facility]    STERNOTOMY MINI  2014    Procedure: STERNOTOMY MINI;  Surgeon: Portillo Slater MD;  Location: [facility]    TRANSPLANT LUNG RECIPIENT SINGLE  2013    Procedure: TRANSPLANT LUNG RECIPIENT SINGLE;  Bilateral Lung Transplant, On Pump Oxygenator, Back table organ preparation and Flexible Bronchoscopy;  Surgeon: Ruy Hanson MD;  Location: [facility]       Physical Exam:   GENERAL APPEARANCE: healthy appearing, alert and no distress; neatly groomed  EYES: Eyes grossly normal to inspection, PERRLA, conjunctivae and sclerae without injection or discharge, EOM intact   RESP:  no increased work of breathing; speaks in very complete sentences;   MS: No musculoskeletal defects are noted  SKIN: No suspicious lesions or rashes, hydration status appears adequate with normal skin turgor   PSYCH: Alert and oriented x3; speech- coherent , normal rate and volume; able to articulate logical thoughts, able to abstract reason, no tangential thoughts, no hallucinations or delusions, mentation appears normal, Mood is euthymic. Affect is appropriate for this mood state and bright. Thought content is free of suicidal ideation, hallucinations, and delusions.  Eye contact is good during conversation.       Key Data Reviewed:  LABS: 2025- Cr 0.82, Albumin 4.2,  Hgb 11.5,      IMAGIN2025 MR RECTUM WO & W/CONTRAST  MPRESSION:   1. Stable postsurgical changes of the anal canal without evidence of  recurrent or residual malignancy.  2. No suspicious pelvic lymphadenopathy.  3. Slightly decreased size of the hemorrhagic/proteinaceous right  ovarian cyst with unchanged small mural nodules    40  minutes spent on the date of the encounter doing chart review, history and exam, patient education & counseling, documentation and other activities as noted above.    The longitudinal plan of care for the diagnosis(es)/condition(s) as documented were addressed during this visit. Due to the added complexity in care, I will continue to support Zuleika in the subsequent management and with ongoing continuity of care.    Scott Teixeira MD MS FAAFP CAQHPM  [facility]  Office [phone]  Fax [phone]

## 2025-04-10 NOTE — TELEPHONE ENCOUNTER
Patient appreciated the phone call - states she did not do the chest x-ray bc she had one already - not sure if she will go for sinus x-ray - I did explain what was being looked for - she may change her mind and have done

## 2025-04-14 ENCOUNTER — TELEPHONE (OUTPATIENT)
Age: 55
End: 2025-04-14

## 2025-04-14 NOTE — TELEPHONE ENCOUNTER
Pt endorses vaginal discharge and odor x 1 month. Pt reports exposure to trichomonas.  Pt requesting testing for STDs at treatment Received and faxed back

## 2025-04-14 NOTE — TELEPHONE ENCOUNTER
Jo-Ann al, stated [facility] [employer] will be faxing over request for Incontinence supplies order. Pt stated she needs this to be completed as soon as possible. Also, stated she will be at the hospital today and will have no signal but that she is confirming this order is needed.     Please be advise, thank you.

## 2025-04-16 ENCOUNTER — APPOINTMENT (OUTPATIENT)
Dept: LAB | Facility: CLINIC | Age: 55
End: 2025-04-16
Attending: PSYCHIATRY & NEUROLOGY
Payer: COMMERCIAL

## 2025-04-16 DIAGNOSIS — Z79.899 DRUG THERAPY: ICD-10-CM

## 2025-04-16 PROCEDURE — 80365 DRUG SCREENING OXYCODONE: CPT

## 2025-04-16 PROCEDURE — 80359 METHYLENEDIOXYAMPHETAMINES: CPT

## 2025-04-16 PROCEDURE — 80324 DRUG SCREEN AMPHETAMINES 1/2: CPT

## 2025-04-16 PROCEDURE — 80307 DRUG TEST PRSMV CHEM ANLYZR: CPT

## 2025-04-16 PROCEDURE — 80346 BENZODIAZEPINES1-12: CPT

## 2025-04-16 PROCEDURE — 80361 OPIATES 1 OR MORE: CPT

## 2025-04-21 ENCOUNTER — HOSPITAL ENCOUNTER (EMERGENCY)
Facility: HOSPITAL | Age: 55
End: 2025-04-21
Attending: FAMILY MEDICINE | Admitting: PSYCHIATRY & NEUROLOGY
Payer: COMMERCIAL

## 2025-04-21 ENCOUNTER — HOSPITAL ENCOUNTER (INPATIENT)
Facility: HOSPITAL | Age: 55
LOS: 14 days | Discharge: HOME | DRG: 750 | End: 2025-05-05
Attending: HOSPITALIST | Admitting: HOSPITALIST
Payer: COMMERCIAL

## 2025-04-21 VITALS
HEART RATE: 112 BPM | TEMPERATURE: 98.3 F | BODY MASS INDEX: 28.72 KG/M2 | DIASTOLIC BLOOD PRESSURE: 84 MMHG | SYSTOLIC BLOOD PRESSURE: 133 MMHG | OXYGEN SATURATION: 94 % | WEIGHT: 152 LBS | RESPIRATION RATE: 18 BRPM

## 2025-04-21 DIAGNOSIS — F41.9 ANXIETY: ICD-10-CM

## 2025-04-21 DIAGNOSIS — F41.1 GAD (GENERALIZED ANXIETY DISORDER): Chronic | ICD-10-CM

## 2025-04-21 DIAGNOSIS — R45.851 SUICIDAL IDEATIONS: ICD-10-CM

## 2025-04-21 DIAGNOSIS — M54.50 CHRONIC MIDLINE LOW BACK PAIN WITHOUT SCIATICA: ICD-10-CM

## 2025-04-21 DIAGNOSIS — F25.0 SCHIZOAFFECTIVE DISORDER, BIPOLAR TYPE (HCC): Primary | Chronic | ICD-10-CM

## 2025-04-21 DIAGNOSIS — G89.29 CHRONIC MIDLINE LOW BACK PAIN WITHOUT SCIATICA: ICD-10-CM

## 2025-04-21 DIAGNOSIS — Z00.8 ENCOUNTER FOR PSYCHOLOGICAL EVALUATION: Primary | ICD-10-CM

## 2025-04-21 DIAGNOSIS — Z00.8 ENCOUNTER FOR PSYCHOLOGICAL EVALUATION: ICD-10-CM

## 2025-04-21 DIAGNOSIS — F43.12 POST-TRAUMATIC STRESS DISORDER, CHRONIC: Chronic | ICD-10-CM

## 2025-04-21 LAB
1OH-MIDAZOLAM UR CFM-MCNC: NOT DETECTED NG/MG CREAT
6MAM UR QL SCN: NEGATIVE NG/ML
7AMINOCLONAZEPAM UR CFM-MCNC: NOT DETECTED NG/MG CREAT
A-OH ALPRAZ UR QL CFM: NOT DETECTED NG/MG CREAT
ACCEPTABLE CREAT UR QL: 59 MG/DL
ALBUMIN SERPL BCG-MCNC: 4.4 G/DL (ref 3.5–5)
ALP SERPL-CCNC: 83 U/L (ref 34–104)
ALPRAZ/CREAT UR CFM: NOT DETECTED NG/MG CREAT
ALT SERPL W P-5'-P-CCNC: 13 U/L (ref 7–52)
AMPHET UR QL CFM: 3308 NG/MG CREAT
AMPHET UR QL SCN: NORMAL NG/ML
AMPHETAMINES SERPL QL SCN: NEGATIVE
AMPHETAMINES: ABNORMAL
ANION GAP SERPL CALCULATED.3IONS-SCNC: 11 MMOL/L (ref 4–13)
AST SERPL W P-5'-P-CCNC: 12 U/L (ref 13–39)
BARBITURATES UR QL SCN: NEGATIVE NG/ML
BARBITURATES UR QL: NEGATIVE
BASOPHILS # BLD AUTO: 0.07 THOUSANDS/ÂΜL (ref 0–0.1)
BASOPHILS NFR BLD AUTO: 1 % (ref 0–1)
BENZODIAZ UR QL SCN: NORMAL NG/ML
BENZODIAZ UR QL: NEGATIVE
BENZODIAZEPINES: ABNORMAL
BILIRUB SERPL-MCNC: 0.28 MG/DL (ref 0.2–1)
BILIRUB UR QL STRIP: NEGATIVE
BUN SERPL-MCNC: 5 MG/DL (ref 5–25)
BUPRENORPHINE UR QL CFM: NEGATIVE NG/ML
CALCIUM SERPL-MCNC: 9.3 MG/DL (ref 8.4–10.2)
CANNABINOIDS UR QL SCN: NEGATIVE NG/ML
CARISOPRODOL UR QL: NEGATIVE NG/ML
CHLORIDE SERPL-SCNC: 98 MMOL/L (ref 96–108)
CLARITY UR: CLEAR
CLONAZEPAM/CREAT UR CFM: NOT DETECTED NG/MG CREAT
CO2 SERPL-SCNC: 25 MMOL/L (ref 21–32)
COCAINE UR QL: NEGATIVE
COCAINE+BZE UR QL SCN: NEGATIVE NG/ML
CODEINE UR QL CFM: NOT DETECTED NG/MG CREAT
COLOR UR: ABNORMAL
CREAT SERPL-MCNC: 0.66 MG/DL (ref 0.6–1.3)
DHC UR CFM-MCNC: NOT DETECTED NG/MG CREAT
DIAZEPAM/CREAT UR: NOT DETECTED NG/MG CREAT
EOSINOPHIL # BLD AUTO: 0.2 THOUSAND/ÂΜL (ref 0–0.61)
EOSINOPHIL NFR BLD AUTO: 1 % (ref 0–6)
ERYTHROCYTE [DISTWIDTH] IN BLOOD BY AUTOMATED COUNT: 14.6 % (ref 11.6–15.1)
ETHANOL SERPL-MCNC: <10 MG/DL
ETHYL GLUCURONIDE UR QL SCN: NEGATIVE NG/ML
FENTANYL UR QL SCN: NEGATIVE
FENTANYL UR QL SCN: NEGATIVE NG/ML
FLUNITRAZEPAM UR-MCNC: NOT DETECTED NG/MG CREAT
GABAPENTIN SERPLBLD QL SCN: NEGATIVE UG/ML
GFR SERPL CREATININE-BSD FRML MDRD: 101 ML/MIN/1.73SQ M
GLUCOSE SERPL-MCNC: 115 MG/DL (ref 65–140)
GLUCOSE UR STRIP-MCNC: NEGATIVE MG/DL
HCT VFR BLD AUTO: 41.1 % (ref 34.8–46.1)
HGB BLD-MCNC: 13.4 G/DL (ref 11.5–15.4)
HGB UR QL STRIP.AUTO: NEGATIVE
HYDROCODONE UR QL CFM: NOT DETECTED NG/MG CREAT
HYDROCODONE UR QL SCN: NEGATIVE
HYDROMORPHONE UR QL CFM: NOT DETECTED NG/MG CREAT
IMM GRANULOCYTES # BLD AUTO: 0.07 THOUSAND/UL (ref 0–0.2)
IMM GRANULOCYTES NFR BLD AUTO: 1 % (ref 0–2)
KETONES UR STRIP-MCNC: NEGATIVE MG/DL
LEUKOCYTE ESTERASE UR QL STRIP: NEGATIVE
LORAZEPAM UR CFM-MCNC: 546 NG/MG CREAT
LYMPHOCYTES # BLD AUTO: 2.74 THOUSANDS/ÂΜL (ref 0.6–4.47)
LYMPHOCYTES NFR BLD AUTO: 18 % (ref 14–44)
MCH RBC QN AUTO: 30.2 PG (ref 26.8–34.3)
MCHC RBC AUTO-ENTMCNC: 32.6 G/DL (ref 31.4–37.4)
MCV RBC AUTO: 93 FL (ref 82–98)
MDMA UR QL CFM: NOT DETECTED NG/MG CREAT
MDMA UR QL CFM: NOT DETECTED NG/MG CREAT
METHADONE UR QL SCN: NEGATIVE NG/ML
METHADONE UR QL: NEGATIVE
METHAMPHET UR QL CFM: 8051 NG/MG CREAT
MIDAZOLAM/CREAT UR CFM: NOT DETECTED NG/MG CREAT
MONOCYTES # BLD AUTO: 0.77 THOUSAND/ÂΜL (ref 0.17–1.22)
MONOCYTES NFR BLD AUTO: 5 % (ref 4–12)
MORPHINE UR QL CFM: NOT DETECTED NG/MG CREAT
NEUTROPHILS # BLD AUTO: 11.39 THOUSANDS/ÂΜL (ref 1.85–7.62)
NEUTS SEG NFR BLD AUTO: 74 % (ref 43–75)
NITRITE UR QL STRIP: NEGATIVE
NITRITE UR QL STRIP: NEGATIVE UG/ML
NORCODEINE/CREAT UR CFM: NOT DETECTED NG/MG CREAT
NORDIAZEPAM UR CFM-MCNC: 175 NG/MG CREAT
NORFLUNITRAZEPAM UR-MCNC: NOT DETECTED NG/MG CREAT
NORHYDROCODONE UR CFM-MCNC: NOT DETECTED NG/MG CREAT
NORMORPHINE: NOT DETECTED NG/MG CREAT
NOROXYCODONE UR CFM-MCNC: 1585 NG/MG CREAT
NOROXYMORPHONE/CREAT UR CFM: 149 NG/MG CREAT
NRBC BLD AUTO-RTO: 0 /100 WBCS
OH-ETHYLFLURAZ UR CFM-MCNC: NOT DETECTED NG/MG CREAT
OH-TRIAZOLAM UR CFM-MCNC: NOT DETECTED NG/MG CREAT
OPIATE CLASS: NEGATIVE
OPIATES UR QL SCN: NEGATIVE
OPIATES UR QL SCN: NORMAL NG/ML
OXAZEPAM CTO UR CFM-MCNC: 222 NG/MG CREAT
OXYCODONE CLASS: ABNORMAL
OXYCODONE UR QL CFM: 1275 NG/MG CREAT
OXYCODONE+OXYMORPHONE UR QL SCN: NORMAL NG/ML
OXYCODONE+OXYMORPHONE UR QL SCN: POSITIVE
OXYMORPHONE UR QL CFM: 946 NG/MG CREAT
PCP UR QL SCN: NEGATIVE NG/ML
PCP UR QL: NEGATIVE
PH UR STRIP.AUTO: 6 [PH]
PLATELET # BLD AUTO: 303 THOUSANDS/UL (ref 149–390)
PMV BLD AUTO: 9 FL (ref 8.9–12.7)
POTASSIUM SERPL-SCNC: 3.4 MMOL/L (ref 3.5–5.3)
PROPOXYPH UR QL SCN: NEGATIVE NG/ML
PROT SERPL-MCNC: 7.4 G/DL (ref 6.4–8.4)
PROT UR STRIP-MCNC: NEGATIVE MG/DL
RBC # BLD AUTO: 4.43 MILLION/UL (ref 3.81–5.12)
SODIUM SERPL-SCNC: 134 MMOL/L (ref 135–147)
SP GR UR STRIP.AUTO: <=1.005
SPECIMEN PH ACCEPTABLE UR: 5.5 (ref 4.5–8.9)
TAPENTADOL UR QL SCN: NEGATIVE NG/ML
TEMAZEPAM UR CFM-MCNC: 202 NG/MG CREAT
THC UR QL: NEGATIVE
TRAMADOL UR QL SCN: NEGATIVE NG/ML
TSH SERPL DL<=0.05 MIU/L-ACNC: 1.12 UIU/ML (ref 0.45–4.5)
UROBILINOGEN UR QL STRIP.AUTO: 0.2 E.U./DL
WBC # BLD AUTO: 15.24 THOUSAND/UL (ref 4.31–10.16)

## 2025-04-21 PROCEDURE — 36415 COLL VENOUS BLD VENIPUNCTURE: CPT | Performed by: FAMILY MEDICINE

## 2025-04-21 PROCEDURE — 99285 EMERGENCY DEPT VISIT HI MDM: CPT | Performed by: FAMILY MEDICINE

## 2025-04-21 PROCEDURE — 84443 ASSAY THYROID STIM HORMONE: CPT | Performed by: FAMILY MEDICINE

## 2025-04-21 PROCEDURE — 80307 DRUG TEST PRSMV CHEM ANLYZR: CPT | Performed by: FAMILY MEDICINE

## 2025-04-21 PROCEDURE — 99285 EMERGENCY DEPT VISIT HI MDM: CPT

## 2025-04-21 PROCEDURE — 82077 ASSAY SPEC XCP UR&BREATH IA: CPT | Performed by: FAMILY MEDICINE

## 2025-04-21 PROCEDURE — 85025 COMPLETE CBC W/AUTO DIFF WBC: CPT | Performed by: FAMILY MEDICINE

## 2025-04-21 PROCEDURE — 93005 ELECTROCARDIOGRAM TRACING: CPT

## 2025-04-21 PROCEDURE — 81003 URINALYSIS AUTO W/O SCOPE: CPT | Performed by: FAMILY MEDICINE

## 2025-04-21 PROCEDURE — 80053 COMPREHEN METABOLIC PANEL: CPT | Performed by: FAMILY MEDICINE

## 2025-04-21 RX ORDER — BENZTROPINE MESYLATE 1 MG/ML
0.5 INJECTION, SOLUTION INTRAMUSCULAR; INTRAVENOUS
Status: DISCONTINUED | OUTPATIENT
Start: 2025-04-21 | End: 2025-04-24

## 2025-04-21 RX ORDER — BENZTROPINE MESYLATE 1 MG/ML
1 INJECTION, SOLUTION INTRAMUSCULAR; INTRAVENOUS
Status: DISCONTINUED | OUTPATIENT
Start: 2025-04-21 | End: 2025-05-05 | Stop reason: HOSPADM

## 2025-04-21 RX ORDER — MAGNESIUM HYDROXIDE/ALUMINUM HYDROXICE/SIMETHICONE 120; 1200; 1200 MG/30ML; MG/30ML; MG/30ML
30 SUSPENSION ORAL EVERY 4 HOURS PRN
Status: CANCELLED | OUTPATIENT
Start: 2025-04-21

## 2025-04-21 RX ORDER — CHLORAL HYDRATE 500 MG
1000 CAPSULE ORAL DAILY
Status: DISCONTINUED | OUTPATIENT
Start: 2025-04-22 | End: 2025-05-05 | Stop reason: HOSPADM

## 2025-04-21 RX ORDER — DIPHENHYDRAMINE HYDROCHLORIDE 50 MG/ML
50 INJECTION, SOLUTION INTRAMUSCULAR; INTRAVENOUS EVERY 6 HOURS PRN
Status: CANCELLED | OUTPATIENT
Start: 2025-04-21

## 2025-04-21 RX ORDER — POLYETHYLENE GLYCOL 3350 17 G/17G
17 POWDER, FOR SOLUTION ORAL DAILY PRN
Status: CANCELLED | OUTPATIENT
Start: 2025-04-21

## 2025-04-21 RX ORDER — ACETAMINOPHEN 325 MG/1
975 TABLET ORAL EVERY 6 HOURS PRN
Status: DISCONTINUED | OUTPATIENT
Start: 2025-04-21 | End: 2025-05-05 | Stop reason: HOSPADM

## 2025-04-21 RX ORDER — NICOTINE 21 MG/24HR
1 PATCH, TRANSDERMAL 24 HOURS TRANSDERMAL DAILY
Status: DISCONTINUED | OUTPATIENT
Start: 2025-04-22 | End: 2025-05-05 | Stop reason: HOSPADM

## 2025-04-21 RX ORDER — LORAZEPAM 2 MG/ML
1 INJECTION INTRAMUSCULAR
Status: CANCELLED | OUTPATIENT
Start: 2025-04-21

## 2025-04-21 RX ORDER — BENZTROPINE MESYLATE 0.5 MG/1
1 TABLET ORAL
Status: CANCELLED | OUTPATIENT
Start: 2025-04-21

## 2025-04-21 RX ORDER — HYDROXYZINE HYDROCHLORIDE 25 MG/1
25 TABLET, FILM COATED ORAL
Status: DISCONTINUED | OUTPATIENT
Start: 2025-04-21 | End: 2025-05-05 | Stop reason: HOSPADM

## 2025-04-21 RX ORDER — ACETAMINOPHEN 325 MG/1
975 TABLET ORAL EVERY 6 HOURS PRN
Status: CANCELLED | OUTPATIENT
Start: 2025-04-21

## 2025-04-21 RX ORDER — LORAZEPAM 2 MG/ML
2 INJECTION INTRAMUSCULAR
Status: DISCONTINUED | OUTPATIENT
Start: 2025-04-21 | End: 2025-04-24

## 2025-04-21 RX ORDER — LANOLIN ALCOHOL/MO/W.PET/CERES
100 CREAM (GRAM) TOPICAL DAILY
Status: DISCONTINUED | OUTPATIENT
Start: 2025-04-22 | End: 2025-05-05 | Stop reason: HOSPADM

## 2025-04-21 RX ORDER — LANOLIN ALCOHOL/MO/W.PET/CERES
400 CREAM (GRAM) TOPICAL EVERY MORNING
Status: DISCONTINUED | OUTPATIENT
Start: 2025-04-22 | End: 2025-04-22

## 2025-04-21 RX ORDER — HYDROXYZINE HYDROCHLORIDE 25 MG/1
25 TABLET, FILM COATED ORAL
Status: CANCELLED | OUTPATIENT
Start: 2025-04-21

## 2025-04-21 RX ORDER — DIPHENHYDRAMINE HYDROCHLORIDE 50 MG/ML
50 INJECTION, SOLUTION INTRAMUSCULAR; INTRAVENOUS EVERY 6 HOURS PRN
Status: DISCONTINUED | OUTPATIENT
Start: 2025-04-21 | End: 2025-05-05 | Stop reason: HOSPADM

## 2025-04-21 RX ORDER — HYDROCORTISONE 25 MG/G
CREAM TOPICAL 4 TIMES DAILY PRN
Status: DISCONTINUED | OUTPATIENT
Start: 2025-04-21 | End: 2025-05-05 | Stop reason: HOSPADM

## 2025-04-21 RX ORDER — BISACODYL 10 MG
10 SUPPOSITORY, RECTAL RECTAL DAILY PRN
Status: CANCELLED | OUTPATIENT
Start: 2025-04-21

## 2025-04-21 RX ORDER — LORAZEPAM 2 MG/ML
2 INJECTION INTRAMUSCULAR EVERY 6 HOURS PRN
Status: CANCELLED | OUTPATIENT
Start: 2025-04-21

## 2025-04-21 RX ORDER — ALBUTEROL SULFATE 90 UG/1
2 INHALANT RESPIRATORY (INHALATION) EVERY 4 HOURS PRN
Status: DISCONTINUED | OUTPATIENT
Start: 2025-04-21 | End: 2025-05-05 | Stop reason: HOSPADM

## 2025-04-21 RX ORDER — HALOPERIDOL 5 MG/1
5 TABLET ORAL
Status: DISCONTINUED | OUTPATIENT
Start: 2025-04-21 | End: 2025-04-24

## 2025-04-21 RX ORDER — HALOPERIDOL 5 MG/ML
5 INJECTION INTRAMUSCULAR
Status: DISCONTINUED | OUTPATIENT
Start: 2025-04-21 | End: 2025-04-24

## 2025-04-21 RX ORDER — POLYETHYLENE GLYCOL 3350 17 G/17G
17 POWDER, FOR SOLUTION ORAL DAILY PRN
Status: DISCONTINUED | OUTPATIENT
Start: 2025-04-21 | End: 2025-05-05 | Stop reason: HOSPADM

## 2025-04-21 RX ORDER — HALOPERIDOL 0.5 MG/1
1 TABLET ORAL EVERY 6 HOURS PRN
Status: DISCONTINUED | OUTPATIENT
Start: 2025-04-21 | End: 2025-04-24

## 2025-04-21 RX ORDER — PANTOPRAZOLE SODIUM 40 MG/1
40 TABLET, DELAYED RELEASE ORAL
Status: DISCONTINUED | OUTPATIENT
Start: 2025-04-22 | End: 2025-05-05 | Stop reason: HOSPADM

## 2025-04-21 RX ORDER — BENZTROPINE MESYLATE 1 MG/ML
1 INJECTION, SOLUTION INTRAMUSCULAR; INTRAVENOUS
Status: CANCELLED | OUTPATIENT
Start: 2025-04-21

## 2025-04-21 RX ORDER — AMOXICILLIN 250 MG
1 CAPSULE ORAL DAILY PRN
Status: DISCONTINUED | OUTPATIENT
Start: 2025-04-21 | End: 2025-05-05 | Stop reason: HOSPADM

## 2025-04-21 RX ORDER — LORAZEPAM 2 MG/ML
1 INJECTION INTRAMUSCULAR
Status: DISCONTINUED | OUTPATIENT
Start: 2025-04-21 | End: 2025-04-24

## 2025-04-21 RX ORDER — HALOPERIDOL 5 MG/ML
2.5 INJECTION INTRAMUSCULAR
Status: DISCONTINUED | OUTPATIENT
Start: 2025-04-21 | End: 2025-04-24

## 2025-04-21 RX ORDER — FLUTICASONE FUROATE AND VILANTEROL 100; 25 UG/1; UG/1
1 POWDER RESPIRATORY (INHALATION)
Status: DISCONTINUED | OUTPATIENT
Start: 2025-04-22 | End: 2025-05-05 | Stop reason: HOSPADM

## 2025-04-21 RX ORDER — POTASSIUM CHLORIDE 1500 MG/1
40 TABLET, EXTENDED RELEASE ORAL ONCE
Status: DISCONTINUED | OUTPATIENT
Start: 2025-04-21 | End: 2025-04-21 | Stop reason: HOSPADM

## 2025-04-21 RX ORDER — LORAZEPAM 2 MG/ML
2 INJECTION INTRAMUSCULAR EVERY 6 HOURS PRN
Status: DISCONTINUED | OUTPATIENT
Start: 2025-04-21 | End: 2025-05-05 | Stop reason: HOSPADM

## 2025-04-21 RX ORDER — HYDROXYZINE HYDROCHLORIDE 50 MG/1
100 TABLET, FILM COATED ORAL
Status: CANCELLED | OUTPATIENT
Start: 2025-04-21

## 2025-04-21 RX ORDER — HALOPERIDOL 0.5 MG/1
1 TABLET ORAL EVERY 6 HOURS PRN
Status: CANCELLED | OUTPATIENT
Start: 2025-04-21

## 2025-04-21 RX ORDER — PROPRANOLOL HYDROCHLORIDE 10 MG/1
10 TABLET ORAL EVERY 8 HOURS PRN
Status: DISCONTINUED | OUTPATIENT
Start: 2025-04-21 | End: 2025-05-05 | Stop reason: HOSPADM

## 2025-04-21 RX ORDER — FOLIC ACID 1 MG/1
1 TABLET ORAL DAILY
Status: DISCONTINUED | OUTPATIENT
Start: 2025-04-22 | End: 2025-05-05 | Stop reason: HOSPADM

## 2025-04-21 RX ORDER — TRAZODONE HYDROCHLORIDE 50 MG/1
50 TABLET ORAL
Status: DISCONTINUED | OUTPATIENT
Start: 2025-04-21 | End: 2025-05-05 | Stop reason: HOSPADM

## 2025-04-21 RX ORDER — OXYBUTYNIN CHLORIDE 5 MG/1
5 TABLET ORAL 2 TIMES DAILY
Status: DISCONTINUED | OUTPATIENT
Start: 2025-04-21 | End: 2025-05-05 | Stop reason: HOSPADM

## 2025-04-21 RX ORDER — LORAZEPAM 1 MG/1
1 TABLET ORAL ONCE
Status: COMPLETED | OUTPATIENT
Start: 2025-04-21 | End: 2025-04-21

## 2025-04-21 RX ORDER — ACETAMINOPHEN 325 MG/1
650 TABLET ORAL EVERY 6 HOURS PRN
Status: DISCONTINUED | OUTPATIENT
Start: 2025-04-21 | End: 2025-05-05 | Stop reason: HOSPADM

## 2025-04-21 RX ORDER — BENZTROPINE MESYLATE 1 MG/ML
1 INJECTION, SOLUTION INTRAMUSCULAR; INTRAVENOUS
Status: DISCONTINUED | OUTPATIENT
Start: 2025-04-21 | End: 2025-04-24

## 2025-04-21 RX ORDER — HYDROXYZINE HYDROCHLORIDE 50 MG/1
50 TABLET, FILM COATED ORAL
Status: CANCELLED | OUTPATIENT
Start: 2025-04-21

## 2025-04-21 RX ORDER — ACETAMINOPHEN 325 MG/1
650 TABLET ORAL EVERY 6 HOURS PRN
Status: CANCELLED | OUTPATIENT
Start: 2025-04-21

## 2025-04-21 RX ORDER — PROPRANOLOL HCL 20 MG
10 TABLET ORAL EVERY 8 HOURS PRN
Status: CANCELLED | OUTPATIENT
Start: 2025-04-21

## 2025-04-21 RX ORDER — HALOPERIDOL 5 MG/ML
5 INJECTION INTRAMUSCULAR
Status: CANCELLED | OUTPATIENT
Start: 2025-04-21

## 2025-04-21 RX ORDER — AMOXICILLIN 250 MG
1 CAPSULE ORAL DAILY PRN
Status: CANCELLED | OUTPATIENT
Start: 2025-04-21

## 2025-04-21 RX ORDER — ACETAMINOPHEN 325 MG/1
650 TABLET ORAL EVERY 4 HOURS PRN
Status: DISCONTINUED | OUTPATIENT
Start: 2025-04-21 | End: 2025-05-05 | Stop reason: HOSPADM

## 2025-04-21 RX ORDER — ACETAMINOPHEN 325 MG/1
650 TABLET ORAL EVERY 4 HOURS PRN
Status: CANCELLED | OUTPATIENT
Start: 2025-04-21

## 2025-04-21 RX ORDER — HALOPERIDOL 5 MG/1
5 TABLET ORAL
Status: CANCELLED | OUTPATIENT
Start: 2025-04-21

## 2025-04-21 RX ORDER — BENZTROPINE MESYLATE 1 MG/1
1 TABLET ORAL
Status: DISCONTINUED | OUTPATIENT
Start: 2025-04-21 | End: 2025-05-05 | Stop reason: HOSPADM

## 2025-04-21 RX ORDER — TRAZODONE HYDROCHLORIDE 50 MG/1
50 TABLET ORAL
Status: CANCELLED | OUTPATIENT
Start: 2025-04-21

## 2025-04-21 RX ORDER — HALOPERIDOL 5 MG/ML
2.5 INJECTION INTRAMUSCULAR
Status: CANCELLED | OUTPATIENT
Start: 2025-04-21

## 2025-04-21 RX ORDER — BISACODYL 10 MG
10 SUPPOSITORY, RECTAL RECTAL DAILY PRN
Status: DISCONTINUED | OUTPATIENT
Start: 2025-04-21 | End: 2025-05-05 | Stop reason: HOSPADM

## 2025-04-21 RX ORDER — MAGNESIUM HYDROXIDE/ALUMINUM HYDROXICE/SIMETHICONE 120; 1200; 1200 MG/30ML; MG/30ML; MG/30ML
30 SUSPENSION ORAL EVERY 4 HOURS PRN
Status: DISCONTINUED | OUTPATIENT
Start: 2025-04-21 | End: 2025-05-05 | Stop reason: HOSPADM

## 2025-04-21 RX ORDER — LANOLIN ALCOHOL/MO/W.PET/CERES
100 CREAM (GRAM) TOPICAL DAILY
Status: DISCONTINUED | OUTPATIENT
Start: 2025-04-22 | End: 2025-04-21

## 2025-04-21 RX ORDER — LORAZEPAM 2 MG/ML
2 INJECTION INTRAMUSCULAR
Status: CANCELLED | OUTPATIENT
Start: 2025-04-21

## 2025-04-21 RX ORDER — BENZTROPINE MESYLATE 1 MG/ML
0.5 INJECTION, SOLUTION INTRAMUSCULAR; INTRAVENOUS
Status: CANCELLED | OUTPATIENT
Start: 2025-04-21

## 2025-04-21 RX ORDER — HALOPERIDOL 5 MG/1
2.5 TABLET ORAL
Status: CANCELLED | OUTPATIENT
Start: 2025-04-21

## 2025-04-21 RX ORDER — HYDROXYZINE HYDROCHLORIDE 50 MG/1
100 TABLET, FILM COATED ORAL
Status: DISCONTINUED | OUTPATIENT
Start: 2025-04-21 | End: 2025-05-05 | Stop reason: HOSPADM

## 2025-04-21 RX ORDER — ONDANSETRON 4 MG/1
4 TABLET, ORALLY DISINTEGRATING ORAL EVERY 6 HOURS PRN
Status: DISCONTINUED | OUTPATIENT
Start: 2025-04-21 | End: 2025-05-05 | Stop reason: HOSPADM

## 2025-04-21 RX ORDER — HYDROXYZINE HYDROCHLORIDE 50 MG/1
50 TABLET, FILM COATED ORAL
Status: DISCONTINUED | OUTPATIENT
Start: 2025-04-21 | End: 2025-05-05 | Stop reason: HOSPADM

## 2025-04-21 RX ADMIN — LORAZEPAM 1 MG: 1 TABLET ORAL at 14:18
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
## 2025-04-21 NOTE — ED NOTES
Crisis prepared hospital packet, copies obtained for the record, EMTALA and Medical necessity Form.    Original 201 in the packet.

## 2025-04-21 NOTE — ED NOTES
The [facility] Psych Transport you requested for Jo-Ann HANSEN in unit/room ED 01 on 04/21/2025 is scheduled to arrive at 7:30pm EDT! [facility]

## 2025-04-21 NOTE — ED NOTES
Crisis prepared 201 and obtained signatures. Pt informed about her 201 rights, inpatient treatment process, and 72 hours notice. Pt appears to be in understanding.      Referral sent to Intake for review.    PATIENT REQUESTED SLL ONLY AT THIS TIME.

## 2025-04-21 NOTE — ED NOTES
Patient is accepted at [facility].  Patient is accepted by Dr. Castro per Terry/Intake.     Transportation is arranged with [facility].     Transportation is scheduled for pending.   Patient may go to the floor at pending.          Nurse report is to be called to [phone] prior to patient transfer.

## 2025-04-21 NOTE — ED NOTES
"Jo-Ann Lamb is a 55 y/o female diagnosed with   Schizoaffective disorder Bipolar type, who presents to ED due to:  Patient arrives with complaints of \"feeling suicidal\" with no plan. Also stated has felt confused and keeps becoming \"psychotic\"    Pt appears anxious but cooperative, Currently lives in assisted living facility. Pt oriented x4. Presents with a good eye contact.  Pt reports she feels becoming \"more psychotic\". Pt reports she stopped taking her psychotic medication per psychiatrist request. Pt reports she feels more and more paranoid, depressed. Pt feels as other people at the facility are trying to poison her. Pt reports she feels very anxious due to that fact and fear dying. Pt also reports she feels suicidal but denies current intend or plan. Pt reports daily stressors and past trauma as triggers. Pt denies homicidal ideations, self harming.  Pt reports hearing voices and seeing faces.  Pt denies command hallucinations.  Pt feels she overeats. Pt denies sleeping problems but at times have difficulty to fall asleep due to hallucinations. Pt requesting inpatient treatment, not able to contract for safety at this time.  "

## 2025-04-21 NOTE — LETTER
[facility] EMERGENCY DEPARTMENT  [address] [place] [address]  Dept: [phone]      EMTALA TRANSFER CONSENT    NAME Jo-Ann SHARIF 1970                              MRN [id number]    I have been informed of my rights regarding examination, treatment, and transfer   by Dr. Taylor Castro MD    Benefits: Continuity of care    Risks: Potential for delay in receiving treatment      Consent for Transfer:  I acknowledge that my medical condition has been evaluated and explained to me by the emergency department physician or other qualified medical person and/or my attending physician, who has recommended that I be transferred to the service of  Accepting Physician:  at Accepting Facility Name, City & State : . The above potential benefits of such transfer, the potential risks associated with such transfer, and the probable risks of not being transferred have been explained to me, and I fully understand them.  The doctor has explained that, in my case, the benefits of transfer outweigh the risks.  I agree to be transferred.    I authorize the performance of emergency medical procedures and treatments upon me in both transit and upon arrival at the receiving facility.  Additionally, I authorize the release of any and all medical records to the receiving facility and request they be transported with me, if possible.  I understand that the safest mode of transportation during a medical emergency is an ambulance and that the Hospital advocates the use of this mode of transport. Risks of traveling to the receiving facility by car, including absence of medical control, life sustaining equipment, such as oxygen, and medical personnel has been explained to me and I fully understand them.    (OSEI CORRECT BOX BELOW)  [ X ]  I consent to the stated transfer and to be transported by ambulance/helicopter.  [  ]  I consent to the stated  transfer, but refuse transportation by ambulance and accept full responsibility for my transportation by car.  I understand the risks of non-ambulance transfers and I exonerate the Hospital and its staff from any deterioration in my condition that results from this refusal.    X___________________________________________    DATE  25  TIME________  Signature of patient or legally responsible individual signing on patient behalf           RELATIONSHIP TO PATIENT__SELF_______________________                      Provider Certification    NAME Jo-Ann Lamb                                         1970                              MRN [id number]    A medical screening exam was performed on the above named patient.  Based on the examination:    Condition Necessitating Transfer The primary encounter diagnosis was Encounter for psychological evaluation. Diagnoses of Suicidal ideations and Anxiety were also pertinent to this visit.    Patient Condition: The patient has been stabilized such that within reasonable medical probability, no material deterioration of the patient condition or the condition of the unborn child(yeni) is likely to result from the transfer    Reason for Transfer: Level of Care needed not available at this facility    Transfer Requirements: Facility    Space available and qualified personnel available for treatment as acknowledged by Ayah Quevedo [phone]  Agreed to accept transfer and to provide appropriate medical treatment as acknowledged by         Appropriate medical records of the examination and treatment of the patient are provided at the time of transfer   STAFF INITIAL WHEN COMPLETED __IK_____  Transfer will be performed by qualified personnel from [facility]  and appropriate transfer equipment as required, including the use of necessary and appropriate life support measures.    Provider Certification: I have examined the patient and explained the following  risks and benefits of being transferred/refusing transfer to the patient/family:  The patient is stable for psychiatric transfer because they are medically stable, and is protected from harming him/herself or others during transport      Based on these reasonable risks and benefits to the patient and/or the unborn child(yeni), and based upon the information available at the time of the patient’s examination, I certify that the medical benefits reasonably to be expected from the provision of appropriate medical treatments at another medical facility outweigh the increasing risks, if any, to the individual’s medical condition, and in the case of labor to the unborn child, from effecting the transfer.    X____________________________________________ DATE 04/21/25        TIME_______      ORIGINAL - SEND TO MEDICAL RECORDS   COPY - SEND WITH PATIENT DURING TRANSFER

## 2025-04-22 LAB
25(OH)D3 SERPL-MCNC: 39.1 NG/ML (ref 30–100)
ALBUMIN SERPL BCG-MCNC: 4.1 G/DL (ref 3.5–5)
ALP SERPL-CCNC: 76 U/L (ref 34–104)
ALT SERPL W P-5'-P-CCNC: 10 U/L (ref 7–52)
ANION GAP SERPL CALCULATED.3IONS-SCNC: 9 MMOL/L (ref 4–13)
AST SERPL W P-5'-P-CCNC: 12 U/L (ref 13–39)
ATRIAL RATE: 108 BPM
BACTERIA UR QL AUTO: ABNORMAL /HPF
BASOPHILS # BLD AUTO: 0.05 THOUSANDS/ÂΜL (ref 0–0.1)
BASOPHILS NFR BLD AUTO: 0 % (ref 0–1)
BILIRUB SERPL-MCNC: 0.18 MG/DL (ref 0.2–1)
BILIRUB UR QL STRIP: NEGATIVE
BNP SERPL-MCNC: 23 PG/ML (ref 0–100)
BUN SERPL-MCNC: 9 MG/DL (ref 5–25)
CALCIUM SERPL-MCNC: 8.9 MG/DL (ref 8.4–10.2)
CARDIAC TROPONIN I PNL SERPL HS: 4 NG/L (ref 8–18)
CHLORIDE SERPL-SCNC: 104 MMOL/L (ref 96–108)
CHOLEST SERPL-MCNC: 220 MG/DL (ref ?–200)
CLARITY UR: CLEAR
CLOZAPINE SERPL-MCNC: 144 NG/ML (ref 350–900)
CO2 SERPL-SCNC: 25 MMOL/L (ref 21–32)
COLOR UR: YELLOW
CREAT SERPL-MCNC: 0.55 MG/DL (ref 0.6–1.3)
CRP SERPL QL: 8.5 MG/L
EOSINOPHIL # BLD AUTO: 0.37 THOUSAND/ÂΜL (ref 0–0.61)
EOSINOPHIL NFR BLD AUTO: 3 % (ref 0–6)
ERYTHROCYTE [DISTWIDTH] IN BLOOD BY AUTOMATED COUNT: 14.7 % (ref 11.6–15.1)
FOLATE SERPL-MCNC: 18.7 NG/ML
GFR SERPL CREATININE-BSD FRML MDRD: 107 ML/MIN/1.73SQ M
GLUCOSE SERPL-MCNC: 100 MG/DL (ref 65–140)
GLUCOSE UR STRIP-MCNC: NEGATIVE MG/DL
HCT VFR BLD AUTO: 42 % (ref 34.8–46.1)
HDLC SERPL-MCNC: 58 MG/DL
HGB BLD-MCNC: 13.5 G/DL (ref 11.5–15.4)
HGB UR QL STRIP.AUTO: NEGATIVE
IMM GRANULOCYTES # BLD AUTO: 0.06 THOUSAND/UL (ref 0–0.2)
IMM GRANULOCYTES NFR BLD AUTO: 1 % (ref 0–2)
KETONES UR STRIP-MCNC: NEGATIVE MG/DL
LDLC SERPL CALC-MCNC: 116 MG/DL (ref 0–100)
LEUKOCYTE ESTERASE UR QL STRIP: NEGATIVE
LYMPHOCYTES # BLD AUTO: 2.78 THOUSANDS/ÂΜL (ref 0.6–4.47)
LYMPHOCYTES NFR BLD AUTO: 25 % (ref 14–44)
MCH RBC QN AUTO: 30.4 PG (ref 26.8–34.3)
MCHC RBC AUTO-ENTMCNC: 32.1 G/DL (ref 31.4–37.4)
MCV RBC AUTO: 95 FL (ref 82–98)
MONOCYTES # BLD AUTO: 0.83 THOUSAND/ÂΜL (ref 0.17–1.22)
MONOCYTES NFR BLD AUTO: 7 % (ref 4–12)
NEUTROPHILS # BLD AUTO: 7.21 THOUSANDS/ÂΜL (ref 1.85–7.62)
NEUTS SEG NFR BLD AUTO: 64 % (ref 43–75)
NITRITE UR QL STRIP: NEGATIVE
NON-SQ EPI CELLS URNS QL MICRO: ABNORMAL /HPF
NONHDLC SERPL-MCNC: 162 MG/DL
NRBC BLD AUTO-RTO: 0 /100 WBCS
P AXIS: 66 DEGREES
PH UR STRIP.AUTO: 7 [PH]
PLATELET # BLD AUTO: 308 THOUSANDS/UL (ref 149–390)
PMV BLD AUTO: 9.5 FL (ref 8.9–12.7)
POTASSIUM SERPL-SCNC: 4.2 MMOL/L (ref 3.5–5.3)
PR INTERVAL: 138 MS
PROT SERPL-MCNC: 6.6 G/DL (ref 6.4–8.4)
PROT UR STRIP-MCNC: NEGATIVE MG/DL
QRS AXIS: 7 DEGREES
QRSD INTERVAL: 70 MS
QT INTERVAL: 356 MS
QTC INTERVAL: 477 MS
RBC # BLD AUTO: 4.44 MILLION/UL (ref 3.81–5.12)
RBC #/AREA URNS AUTO: ABNORMAL /HPF
SODIUM SERPL-SCNC: 138 MMOL/L (ref 135–147)
SP GR UR STRIP.AUTO: 1.01
T WAVE AXIS: 64 DEGREES
TREPONEMA PALLIDUM IGG+IGM AB [PRESENCE] IN SERUM OR PLASMA BY IMMUNOASSAY: NORMAL
TRIGL SERPL-MCNC: 230 MG/DL (ref ?–150)
TSH SERPL DL<=0.05 MIU/L-ACNC: 1.64 UIU/ML (ref 0.45–4.5)
UROBILINOGEN UR QL STRIP.AUTO: 0.2 E.U./DL
VENTRICULAR RATE: 108 BPM
VIT B12 SERPL-MCNC: 519 PG/ML (ref 180–914)
WBC # BLD AUTO: 11.3 THOUSAND/UL (ref 4.31–10.16)
WBC #/AREA URNS AUTO: ABNORMAL /HPF

## 2025-04-22 PROCEDURE — 93010 ELECTROCARDIOGRAM REPORT: CPT | Performed by: INTERNAL MEDICINE

## 2025-04-22 PROCEDURE — 85025 COMPLETE CBC W/AUTO DIFF WBC: CPT | Performed by: PSYCHIATRY & NEUROLOGY

## 2025-04-22 PROCEDURE — 84443 ASSAY THYROID STIM HORMONE: CPT | Performed by: PSYCHIATRY & NEUROLOGY

## 2025-04-22 PROCEDURE — 82746 ASSAY OF FOLIC ACID SERUM: CPT | Performed by: PSYCHIATRY & NEUROLOGY

## 2025-04-22 PROCEDURE — 82607 VITAMIN B-12: CPT | Performed by: PSYCHIATRY & NEUROLOGY

## 2025-04-22 PROCEDURE — 80159 DRUG ASSAY CLOZAPINE: CPT | Performed by: HOSPITALIST

## 2025-04-22 PROCEDURE — 80053 COMPREHEN METABOLIC PANEL: CPT | Performed by: PSYCHIATRY & NEUROLOGY

## 2025-04-22 PROCEDURE — 86140 C-REACTIVE PROTEIN: CPT | Performed by: HOSPITALIST

## 2025-04-22 PROCEDURE — 83880 ASSAY OF NATRIURETIC PEPTIDE: CPT | Performed by: HOSPITALIST

## 2025-04-22 PROCEDURE — 99222 1ST HOSP IP/OBS MODERATE 55: CPT | Performed by: HOSPITALIST

## 2025-04-22 PROCEDURE — 80061 LIPID PANEL: CPT | Performed by: PSYCHIATRY & NEUROLOGY

## 2025-04-22 PROCEDURE — 84484 ASSAY OF TROPONIN QUANT: CPT | Performed by: HOSPITALIST

## 2025-04-22 PROCEDURE — 86780 TREPONEMA PALLIDUM: CPT | Performed by: PSYCHIATRY & NEUROLOGY

## 2025-04-22 PROCEDURE — 81001 URINALYSIS AUTO W/SCOPE: CPT | Performed by: PSYCHIATRY & NEUROLOGY

## 2025-04-22 PROCEDURE — 82306 VITAMIN D 25 HYDROXY: CPT | Performed by: PSYCHIATRY & NEUROLOGY

## 2025-04-22 RX ORDER — BISACODYL 5 MG/1
10 TABLET, DELAYED RELEASE ORAL 2 TIMES DAILY
Status: DISCONTINUED | OUTPATIENT
Start: 2025-04-22 | End: 2025-04-27

## 2025-04-22 RX ORDER — GUAIFENESIN 600 MG/1
600 TABLET, EXTENDED RELEASE ORAL EVERY 12 HOURS SCHEDULED
Status: DISCONTINUED | OUTPATIENT
Start: 2025-04-22 | End: 2025-05-05 | Stop reason: HOSPADM

## 2025-04-22 RX ORDER — LAMOTRIGINE 100 MG/1
100 TABLET ORAL 2 TIMES DAILY
Status: DISCONTINUED | OUTPATIENT
Start: 2025-04-22 | End: 2025-05-05 | Stop reason: HOSPADM

## 2025-04-22 RX ORDER — ESCITALOPRAM OXALATE 10 MG/1
10 TABLET ORAL DAILY
Status: DISCONTINUED | OUTPATIENT
Start: 2025-04-22 | End: 2025-05-05 | Stop reason: HOSPADM

## 2025-04-22 RX ORDER — LORAZEPAM 0.5 MG/1
0.5 TABLET ORAL 2 TIMES DAILY
Status: DISCONTINUED | OUTPATIENT
Start: 2025-04-22 | End: 2025-04-27

## 2025-04-22 RX ORDER — DIPHENHYDRAMINE HYDROCHLORIDE AND LIDOCAINE HYDROCHLORIDE AND ALUMINUM HYDROXIDE AND MAGNESIUM HYDRO
10 KIT EVERY 4 HOURS PRN
Status: DISCONTINUED | OUTPATIENT
Start: 2025-04-22 | End: 2025-05-05 | Stop reason: HOSPADM

## 2025-04-22 RX ORDER — AMOXICILLIN 250 MG
1 CAPSULE ORAL
Status: DISCONTINUED | OUTPATIENT
Start: 2025-04-22 | End: 2025-04-22

## 2025-04-22 RX ORDER — OXYCODONE AND ACETAMINOPHEN 5; 325 MG/1; MG/1
1 TABLET ORAL EVERY 12 HOURS PRN
Refills: 0 | Status: DISCONTINUED | OUTPATIENT
Start: 2025-04-22 | End: 2025-04-25

## 2025-04-22 RX ORDER — OXYCODONE AND ACETAMINOPHEN 5; 325 MG/1; MG/1
1 TABLET ORAL
COMMUNITY
Start: 2025-04-16

## 2025-04-22 RX ORDER — RISPERIDONE 2 MG/1
2 TABLET ORAL 2 TIMES DAILY
Status: DISCONTINUED | OUTPATIENT
Start: 2025-04-22 | End: 2025-04-27

## 2025-04-22 RX ADMIN — BISACODYL 10 MG: 5 TABLET, COATED ORAL at 12:32

## 2025-04-22 RX ADMIN — HALOPERIDOL 5 MG: 5 TABLET ORAL at 06:42

## 2025-04-22 RX ADMIN — LAMOTRIGINE 100 MG: 100 TABLET ORAL at 17:38

## 2025-04-22 RX ADMIN — BENZTROPINE MESYLATE 1 MG: 1 TABLET ORAL at 21:41

## 2025-04-22 RX ADMIN — CYANOCOBALAMIN TAB 500 MCG 500 MCG: 500 TAB at 08:18

## 2025-04-22 RX ADMIN — OMEGA-3 FATTY ACIDS CAP 1000 MG 1000 MG: 1000 CAP at 08:18

## 2025-04-22 RX ADMIN — ACETAMINOPHEN 975 MG: 325 TABLET ORAL at 21:49

## 2025-04-22 RX ADMIN — THIAMINE HCL TAB 100 MG 100 MG: 100 TAB at 08:18

## 2025-04-22 RX ADMIN — LAMOTRIGINE 100 MG: 100 TABLET ORAL at 12:32

## 2025-04-22 RX ADMIN — OXYBUTYNIN CHLORIDE 5 MG: 5 TABLET ORAL at 17:38

## 2025-04-22 RX ADMIN — NICOTINE 1 PATCH: 21 PATCH, EXTENDED RELEASE TRANSDERMAL at 08:18

## 2025-04-22 RX ADMIN — LORAZEPAM 0.5 MG: 0.5 TABLET ORAL at 17:38

## 2025-04-22 RX ADMIN — FOLIC ACID 1 MG: 1 TABLET ORAL at 08:18

## 2025-04-22 RX ADMIN — OXYCODONE HYDROCHLORIDE AND ACETAMINOPHEN 1 TABLET: 5; 325 TABLET ORAL at 12:32

## 2025-04-22 RX ADMIN — RISPERIDONE 2 MG: 2 TABLET, FILM COATED ORAL at 17:38

## 2025-04-22 RX ADMIN — CLOZAPINE 350 MG: 25 TABLET ORAL at 20:33

## 2025-04-22 RX ADMIN — ACETAMINOPHEN 650 MG: 325 TABLET ORAL at 17:39

## 2025-04-22 RX ADMIN — HYDROXYZINE HYDROCHLORIDE 50 MG: 50 TABLET, FILM COATED ORAL at 07:12

## 2025-04-22 RX ADMIN — HALOPERIDOL 2.5 MG: 2 TABLET ORAL at 11:28

## 2025-04-22 RX ADMIN — HYDROXYZINE HYDROCHLORIDE 100 MG: 50 TABLET, FILM COATED ORAL at 20:33

## 2025-04-22 RX ADMIN — Medication 1000 UNITS: at 08:18

## 2025-04-22 RX ADMIN — OXYBUTYNIN CHLORIDE 5 MG: 5 TABLET ORAL at 08:18

## 2025-04-22 RX ADMIN — GUAIFENESIN 600 MG: 600 TABLET ORAL at 12:32

## 2025-04-22 RX ADMIN — LORAZEPAM 0.5 MG: 0.5 TABLET ORAL at 12:32

## 2025-04-22 RX ADMIN — ESCITALOPRAM OXALATE 10 MG: 10 TABLET ORAL at 12:32

## 2025-04-22 RX ADMIN — BISACODYL 10 MG: 5 TABLET, COATED ORAL at 17:38

## 2025-04-22 RX ADMIN — PANTOPRAZOLE SODIUM 40 MG: 40 TABLET, DELAYED RELEASE ORAL at 06:42

## 2025-04-22 RX ADMIN — RISPERIDONE 2 MG: 2 TABLET, FILM COATED ORAL at 12:32

## 2025-04-22 RX ADMIN — Medication 400 MG: at 08:18

## 2025-04-22 NOTE — NURSING NOTE
"Patient visible on the unit, social with select peers at times. Patient endorses feeling \"paranoid\". Patient labile, irritable at times. Patient accepts medications as ordered. Denies SI/HI currently. Q 15 maintained.   "

## 2025-04-22 NOTE — TREATMENT TEAM
04/22/25 0643   Broset Violence Checklist   Assessment type Shift   Irritability 1   Confusion 0   Boisterousness 1   Threatening physical violence 0   Verbal threats 1   Violence 0   Broset score 3     Patient administered PRN 5 mg haldol for severe agitation at this time.

## 2025-04-22 NOTE — ASSESSMENT & PLAN NOTE
Patient will be continued on her home medications-  Clozaril 350 mg at bedtime  Risperdal 2 mg twice daily  Lamictal 100 mg twice daily  Ativan 0.5 mg twice daily    Patient complains of significant depression at this time, we will initiate Lexapro 10 mg daily

## 2025-04-22 NOTE — TREATMENT PLAN
TREATMENT PLAN REVIEW - Behavioral Health Jo-Ann Lamb 54 y.o. 1970 female MRN: [id number]    [facility] BE[facility]IOR[facility] Room / Bed: [facility] 256/[facility] 256-01 Encounter: [id number]          Admit Date/Time:  4/21/2025  8:03 PM    Treatment Team:   MD Gabby Valderrama, BETTYE Dhillon, YUDY Davey, YUDY Velez, YUDY Friend    Diagnosis: Principal Problem:    Schizoaffective disorder, bipolar type (HCC)  Active Problems:    LYNN (generalized anxiety disorder)    Post-traumatic stress disorder, chronic      Patient Strengths/Assets: cooperative, financial means, good physical health, stable housing    Patient Barriers/Limitations: chronic mental illness, difficulty adapting, limited family ties    Short Term Goals: decrease in depressive symptoms, decrease in anxiety symptoms, decrease in paranoid thoughts, decrease in psychotic symptoms, ability to stay safe on the unit, improvement in insight, improvement in reality testing    Long Term Goals: resolution of depressive symptoms, free of suicidal thoughts, resolution of psychotic symptoms, improvement in reality testing, improvement in reasoning ability    Progress Towards Goals: starting psychiatric medications as prescribed    Recommended Treatment: medication management, patient medication education, group therapy, milieu therapy, continued Behavioral Health psychiatric evaluation/assessment process    Treatment Frequency: daily medication monitoring, group and milieu therapy daily, monitoring through interdisciplinary rounds, monitoring through weekly patient care conferences    Expected Discharge Date:  April 30, 2025    Discharge Plan: referrals as indicated    Treatment Plan Created/Updated By: Leny Cardenas MD

## 2025-04-22 NOTE — ED NOTES
Insurance Authorization for admission:   Phone call placed to [facility]  Phone number: [phone]     Spoke to Ari  5 days approved.  Level of care: 201/IP  Review on 4/25/2025  Authorization #   [id number]    Eligibility Verification System checked - ([phone]).  Online system / automated system indicates: [place] ELIGIBLE IN [place]
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
## 2025-04-22 NOTE — PROGRESS NOTES
Met with Jo-Ann completed her admission self assessment. She identified being confused about why she was hospitalized. She has not had her medications since she went to the ED. She also believes that she has been getting physically ill a lot lately. She is struggling with day to day living. She does have activities she enjoys. She wants to learn about anger management, improve concentration and memory, have her family understand her struggles, relationship and community supports.

## 2025-04-22 NOTE — TREATMENT TEAM
04/22/25 1129   Broset Violence Checklist   Assessment type Shift   Irritability 1   Confusion 0   Boisterousness 1   Threatening physical violence 0   Verbal threats 0   Violence 0   Broset score 2     Haldol 2.5 mg given for moderate agitation

## 2025-04-22 NOTE — TREATMENT TEAM
04/21/25 2125 04/21/25 2126   Provider Notification   Reason for Communication Admission Admission   Provider Name Jose Damián Vazquez Matthew   Provider Role Attending physician Attending physician   Method of Communication Other (Comment)  (medical board) Other (Comment)  (epic chat)   Response Waiting for response Waiting for response   Notification Time 2125 2126   Shift Event Other (Comment)  (PTA med list and medical board) Other (Comment)  (PTA med list and C-SSRS lifetime/current)

## 2025-04-22 NOTE — NURSING NOTE
"Patient arrived to [facility] from [facility] on a valid 201 commitment. Patient endorsing auditory hallucinations along with an increase in depression, anxiety, and suicidal ideation without a plan. UDS + Oxycodone.    Patient cooperative throughout the admission process. Showered upon arrival to the unit; skin assessment unremarkable. Patient delayed in responses and guarded. She stated \"I'm having delusions of being stalked and my psychosis is worsening. My psychosis makes me very upset and my meds aren't working anymore\". Patient reports medication compliance. Patient able to maintain safety on the unit and agreeable to inform staff if that should change. Patient reports previous suicide attempts; unable to recall how many and uninterested in sharing the method; reports last attempt was \"a few months ago\".     All admission paperwork signed. C-SSRS lifetime is moderate, current is low. PTA medication list reviewed with patient. Providers aware. Tour of the unit was given. Continuous q15 minute rounding and safety checks ongoing.  "

## 2025-04-22 NOTE — PLAN OF CARE
Problem: Alteration in Thoughts and Perception  Goal: Verbalize thoughts and feelings  Description: Interventions:- Promote a nonjudgmental and trusting relationship with the patient through active listening and therapeutic communication- Assess patient's level of functioning, behavior and potential for risk- Engage patient in 1 on 1 interactions- Encourage patient to express fears, feelings, frustrations, and discuss symptoms  - [place] patient to reality, help patient recognize reality-based thinking - Administer medications as ordered and assess for potential side effects- Provide the patient education related to the signs and symptoms of the illness and desired effects of prescribed medications  Outcome: Progressing     Problem: Ineffective Coping  Goal: Cooperates with admission process  Description: Interventions: - Complete admission process  Outcome: Completed

## 2025-04-22 NOTE — TREATMENT TEAM
04/22/25 0712   [place] Anxiety Scale   Anxious Mood 2   Tension 2   Fears 2   Insomnia 0   Intellectual 2   Depressed Mood 2   Somatic Complaints: Muscular 2   Somatic Complaints: Sensory 2   Cardiovascular Symptoms 2   Respiratory Symptoms 0   Gastrointestinal Symptoms 0   Genitourinary Symptoms 0   Autonomic Symptoms 2   Behavior at Interview 2   [place] Anxiety Score 20     Patient endorsing anxiety and anxiety related symptoms. Patient requesting Ativan. Atarax 50mg given PO for a broset of 20. Will continue to monitor and reassess.
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
## 2025-04-22 NOTE — SOCIAL WORK
Gigi spoke to Dandre from [phone]. Dandre shared that pt's UDS was positive for meth and she has increased her alcohol intake over the past several weeks. He stated when she uses is when she becomes paranoid and delusional. Dandre will be mtg with pt on the unit and will collaborate with CM.

## 2025-04-22 NOTE — PROGRESS NOTES
04/22/25 1130   Team Meeting   Meeting Type Tx Team Meeting   Initial Conference Date 04/22/25   Next Conference Date 05/22/25   Team Members Present   Team Members Present Physician;Nurse;   Physician Team Member Dr. Manzano   Nursing Team Member Bryant Altamirano RN   Care Management Team Member Minerva Epperson RN   Patient/Family Present   Patient Present Yes   Patient's Family Present No     Initial Plan  Treatment Team met and reviewed patient strengths, limitations, coping skills, Treatment Plan and Goals; patient reported understanding and agreement and signed the Treatment Plan document. A copy was placed on the chart.

## 2025-04-22 NOTE — NURSING NOTE
Pt given from belomgings  1 isreale no paco tie 1 white 1 green shirt 1 deoderant it was un opened

## 2025-04-22 NOTE — PROGRESS NOTES
04/22/25    Team Meeting   Meeting Type Daily Rounds   Team Members Present   Team Members Present Physician;Nurse;;Occupational Therapist   Physician Team Member Ronald COLEMAN   Nursing Team Member Agustin JIMENES   Social Work Team Member Florian HOPSON   OT Team Member Pope BETTYE   Patient/Family Present   Patient Present No   Patient's Family Present No   201, new admit, states meds are not working, med seeking (percocet and ativan), paranoid, delusions, Independent, med mgmt

## 2025-04-22 NOTE — PLAN OF CARE
Problem: DISCHARGE PLANNING - CARE MANAGEMENT  Goal: Discharge to post-acute care or home with appropriate resources  Outcome: Progressing  Patient is new to the unit; 201; Initial Goal added.
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
## 2025-04-22 NOTE — ED NOTES
Insurance Authorization for admission:   Phone call placed to [facility].  Phone number: [phone]     Spoke to Yancy.     5 days approved.  4/21-4/25)  Level of care: Acute Inpatient  (201)  Review on 4/25.   Authorization # [id number].        Eligibility Verification System checked - ([phone]).  Online system / automated system indicates: Per PROMISe, active [facility]  ID: [id number]

## 2025-04-23 RX ORDER — BISACODYL 5 MG/1
TABLET, DELAYED RELEASE ORAL
Status: COMPLETED
Start: 2025-04-23 | End: 2025-04-23

## 2025-04-23 RX ADMIN — GUAIFENESIN 600 MG: 600 TABLET ORAL at 08:35

## 2025-04-23 RX ADMIN — LORAZEPAM 0.5 MG: 0.5 TABLET ORAL at 17:04

## 2025-04-23 RX ADMIN — CLOZAPINE 350 MG: 25 TABLET ORAL at 20:37

## 2025-04-23 RX ADMIN — LAMOTRIGINE 100 MG: 100 TABLET ORAL at 17:04

## 2025-04-23 RX ADMIN — LAMOTRIGINE 100 MG: 100 TABLET ORAL at 08:35

## 2025-04-23 RX ADMIN — OXYCODONE HYDROCHLORIDE AND ACETAMINOPHEN 1 TABLET: 5; 325 TABLET ORAL at 22:18

## 2025-04-23 RX ADMIN — OMEGA-3 FATTY ACIDS CAP 1000 MG 1000 MG: 1000 CAP at 08:35

## 2025-04-23 RX ADMIN — AMOXICILLIN AND CLAVULANATE POTASSIUM 1 TABLET: 875; 125 TABLET, FILM COATED ORAL at 21:38

## 2025-04-23 RX ADMIN — RISPERIDONE 2 MG: 2 TABLET, FILM COATED ORAL at 17:04

## 2025-04-23 RX ADMIN — BISACODYL 10 MG: 5 TABLET, COATED ORAL at 17:04

## 2025-04-23 RX ADMIN — RISPERIDONE 2 MG: 2 TABLET, FILM COATED ORAL at 08:35

## 2025-04-23 RX ADMIN — BENZTROPINE MESYLATE 1 MG: 1 TABLET ORAL at 22:19

## 2025-04-23 RX ADMIN — FLUTICASONE FUROATE AND VILANTEROL TRIFENATATE 1 PUFF: 100; 25 POWDER RESPIRATORY (INHALATION) at 08:35

## 2025-04-23 RX ADMIN — ESCITALOPRAM OXALATE 10 MG: 10 TABLET ORAL at 08:35

## 2025-04-23 RX ADMIN — AMOXICILLIN AND CLAVULANATE POTASSIUM 1 TABLET: 875; 125 TABLET, FILM COATED ORAL at 12:53

## 2025-04-23 RX ADMIN — HYDROXYZINE HYDROCHLORIDE 100 MG: 50 TABLET, FILM COATED ORAL at 20:37

## 2025-04-23 RX ADMIN — LORAZEPAM 0.5 MG: 0.5 TABLET ORAL at 08:35

## 2025-04-23 RX ADMIN — Medication 1000 UNITS: at 08:35

## 2025-04-23 RX ADMIN — OXYBUTYNIN CHLORIDE 5 MG: 5 TABLET ORAL at 17:04

## 2025-04-23 RX ADMIN — PANTOPRAZOLE SODIUM 40 MG: 40 TABLET, DELAYED RELEASE ORAL at 08:35

## 2025-04-23 RX ADMIN — NICOTINE 1 PATCH: 21 PATCH, EXTENDED RELEASE TRANSDERMAL at 08:37

## 2025-04-23 RX ADMIN — OXYBUTYNIN CHLORIDE 5 MG: 5 TABLET ORAL at 08:35

## 2025-04-23 RX ADMIN — BISACODYL 10 MG: 5 TABLET, COATED ORAL at 08:35

## 2025-04-23 RX ADMIN — CYANOCOBALAMIN TAB 500 MCG 500 MCG: 500 TAB at 08:35

## 2025-04-23 RX ADMIN — THIAMINE HCL TAB 100 MG 100 MG: 100 TAB at 08:35

## 2025-04-23 RX ADMIN — OXYCODONE HYDROCHLORIDE AND ACETAMINOPHEN 1 TABLET: 5; 325 TABLET ORAL at 10:01

## 2025-04-23 RX ADMIN — GUAIFENESIN 600 MG: 600 TABLET ORAL at 20:37

## 2025-04-23 RX ADMIN — FOLIC ACID 1 MG: 1 TABLET ORAL at 08:35

## 2025-04-23 NOTE — NURSING NOTE
"Patient reports feeling \"anxious\" and given Atarax 100 mg for a Martines anxiety score of 27. Will monitor and follow up for effectiveness.   "

## 2025-04-23 NOTE — NURSING NOTE
Patient noted to be visible and socially selective on the milieu. Presents w/ flat/blunted affect. Appears depressed. Denied SI/HI. Endorsed severe anxiety. Offers somatic complaints of pain throughout the evening. Q 15 in safety checks continuous and maintained.

## 2025-04-23 NOTE — NURSING NOTE
Patient visible on the unit, social with peers. Patient brightens on approach. Appears less distracted in conversation, no overt paranoia noted. Denies SI/HI. Accepting medications as ordered. Attending groups. Q 15 maintained.

## 2025-04-23 NOTE — PROGRESS NOTES
04/23/25    Team Meeting   Meeting Type Daily Rounds   Initial Conference Date 04/23/25   Team Members Present   Team Members Present Physician;Nurse;   Physician Team Member Florecita Cardenas Medei   Nursing Team Member [facility]   Care Management Team Member Gordon Du   Patient/Family Present   Patient Present No   Patient's Family Present No   201, paranoid, redirects, med seeking, UDS pos meth, [facility] ACT, explore d&a resources avail, no d/c date restart meds/monitor

## 2025-04-23 NOTE — PLAN OF CARE
Problem: Ineffective Coping  Goal: Participates in unit activities  Description: Interventions:- Provide therapeutic environment - Provide required programming - Redirect inappropriate behaviors   Outcome: Progressing   Pt has attended 50% of groups even though she presents as paranoid.

## 2025-04-23 NOTE — NURSING NOTE
Pt was dropped off 2 bags of candy in nurses station and 1 bottle of new condition charted on her belongings sheet

## 2025-04-23 NOTE — PLAN OF CARE
Problem: Alteration in Thoughts and Perception  Goal: Verbalize thoughts and feelings  Description: Interventions:- Promote a nonjudgmental and trusting relationship with the patient through active listening and therapeutic communication- Assess patient's level of functioning, behavior and potential for risk- Engage patient in 1 on 1 interactions- Encourage patient to express fears, feelings, frustrations, and discuss symptoms  - [place] patient to reality, help patient recognize reality-based thinking - Administer medications as ordered and assess for potential side effects- Provide the patient education related to the signs and symptoms of the illness and desired effects of prescribed medications  Interventions:- Assess and re-assess patient's lethality and potential for self-injury- Engage patient in 1:1 interactions, daily, for a minimum of 15 minutes- Encourage patient to express feelings, fears, frustrations, hopes- Establish rapport/trust with patient   Interventions:- Assess and re-assess patient's level of risk - Engage patient in 1:1 interactions, daily, for a minimum of 15 minutes - Encourage patient to express feelings, fears, frustrations, hopes   Interventions:- Assess and re-assess patient's level of risk, every waking shift- Engage patient in 1:1 interactions, daily, for a minimum of 15 minutes - Allow patient to express feelings and frustrations in a safe and non-threatening manner - Establish rapport/trust with patient   Outcome: Progressing     Problem: Risk for Self Injury/Neglect  Goal: Verbalize thoughts and feelings  Description: Interventions:- Promote a nonjudgmental and trusting relationship with the patient through active listening and therapeutic communication- Assess patient's level of functioning, behavior and potential for risk- Engage patient in 1 on 1 interactions- Encourage patient to express fears, feelings, frustrations, and discuss symptoms  - [place] patient to reality, help  patient recognize reality-based thinking - Administer medications as ordered and assess for potential side effects- Provide the patient education related to the signs and symptoms of the illness and desired effects of prescribed medications  Interventions:- Assess and re-assess patient's lethality and potential for self-injury- Engage patient in 1:1 interactions, daily, for a minimum of 15 minutes- Encourage patient to express feelings, fears, frustrations, hopes- Establish rapport/trust with patient   Interventions:- Assess and re-assess patient's level of risk - Engage patient in 1:1 interactions, daily, for a minimum of 15 minutes - Encourage patient to express feelings, fears, frustrations, hopes   Interventions:- Assess and re-assess patient's level of risk, every waking shift- Engage patient in 1:1 interactions, daily, for a minimum of 15 minutes - Allow patient to express feelings and frustrations in a safe and non-threatening manner - Establish rapport/trust with patient   Outcome: Progressing     Problem: Depression  Goal: Verbalize thoughts and feelings  Description: Interventions:- Promote a nonjudgmental and trusting relationship with the patient through active listening and therapeutic communication- Assess patient's level of functioning, behavior and potential for risk- Engage patient in 1 on 1 interactions- Encourage patient to express fears, feelings, frustrations, and discuss symptoms  - [place] patient to reality, help patient recognize reality-based thinking - Administer medications as ordered and assess for potential side effects- Provide the patient education related to the signs and symptoms of the illness and desired effects of prescribed medications  Interventions:- Assess and re-assess patient's lethality and potential for self-injury- Engage patient in 1:1 interactions, daily, for a minimum of 15 minutes- Encourage patient to express feelings, fears, frustrations, hopes- Establish  rapport/trust with patient   Interventions:- Assess and re-assess patient's level of risk - Engage patient in 1:1 interactions, daily, for a minimum of 15 minutes - Encourage patient to express feelings, fears, frustrations, hopes   Interventions:- Assess and re-assess patient's level of risk, every waking shift- Engage patient in 1:1 interactions, daily, for a minimum of 15 minutes - Allow patient to express feelings and frustrations in a safe and non-threatening manner - Establish rapport/trust with patient   Outcome: Progressing     Problem: Risk for Violence/Aggression Toward Others  Goal: Verbalize thoughts and feelings  Description: Interventions:- Promote a nonjudgmental and trusting relationship with the patient through active listening and therapeutic communication- Assess patient's level of functioning, behavior and potential for risk- Engage patient in 1 on 1 interactions- Encourage patient to express fears, feelings, frustrations, and discuss symptoms  - [place] patient to reality, help patient recognize reality-based thinking - Administer medications as ordered and assess for potential side effects- Provide the patient education related to the signs and symptoms of the illness and desired effects of prescribed medications  Interventions:- Assess and re-assess patient's lethality and potential for self-injury- Engage patient in 1:1 interactions, daily, for a minimum of 15 minutes- Encourage patient to express feelings, fears, frustrations, hopes- Establish rapport/trust with patient   Interventions:- Assess and re-assess patient's level of risk - Engage patient in 1:1 interactions, daily, for a minimum of 15 minutes - Encourage patient to express feelings, fears, frustrations, hopes   Interventions:- Assess and re-assess patient's level of risk, every waking shift- Engage patient in 1:1 interactions, daily, for a minimum of 15 minutes - Allow patient to express feelings and frustrations in a safe and  non-threatening manner - Establish rapport/trust with patient   Outcome: Progressing

## 2025-04-23 NOTE — PROGRESS NOTES
"Progress Note - Jo-Ann Lamb 54 y.o. female MRN: [id number]    Unit/Bed#: [facility] 256-01 Encounter: [id number]        Subjective:   Patient seen and examined at bedside after reviewing the chart and discussing the case with the caring staff.      Patient examined at bedside.  Patient reports junky cough, sore throat, congestion that has been going on for months.  Patient seen by family doctor and recently prescribed Zpak for mycoplasma pneumonia.     Physical Exam   Vitals: Blood pressure 149/88, pulse 87, temperature 97.8 °F (36.6 °C), temperature source Temporal, resp. rate 18, height 5' 1\" (1.549 m), weight 66.6 kg (146 lb 12.8 oz), last menstrual period 01/01/2020, SpO2 96%, not currently breastfeeding.,Body mass index is 27.74 kg/m².  Constitutional: Patient in no acute distress.  HEENT: PERR, EOMI, MMM.  Cardiovascular: Normal rate and regular rhythm.    Pulmonary/Chest: Effort normal and breath sounds normal.   Abdomen: Soft, + BS, NT.    Assessment/Plan:  Jo-Ann Lamb is a(n) 54 y.o. year old female with Schizoaffective DO     HTN.  No longer taking Losartan 50 mg daily.  Will continue to monitor.   HLD.  Continue fish oil daily.  No longer taking atorvastatin 10 mg daily.     Asthma.  Patient is on Breo Ellipta daily (Advair nonform), albuterol inhaler as needed.   Allergic rhinitis.  Stable.  Declines previous Flonase -believes it is causing sore throat.   GERD.  Patient on Protonix 40 mg daily.   Urge incontinence.  Continue oxybutynin 5 mg twice daily.   Constipation/hemorrhoids.  Continue Dulcolax 10 mg twice daily.  Miralax or Senokot as needed.  Apply Anusol-HC as needed.    Chronic back w/ radiculopathy.  Patient follows with pain management.  Patient on Percocet 5-325 mg twice daily as needed for breakthrough pain.  PDMP reviewed.  Patient also receiving epidural injections to lower back outpt.  Tylenol as needed for mild-moderate pain.   Tobacco abuse.  NRT.   Vitamin D deficiency.  Continue vitamin D3 " 1000 units daily.  Recheck levels.   Vitamin B12 deficiency.  Continue B12 500 mcg daily.  Recheck levels.   Sore throat/mouth pain.  Cough drops or Magic mouthwash as needed per Dr. Steel.  Chest congestion/cough.  Patient treated with azithromycin for mycoplasma pneumonia 4/10.  Start Augmentin 875-125 mg twice daily x 7 days.  Patient with frequent infections -needs to follow up outpatient.     The patient was discussed with Dr. Steel and he is in agreement with the above note.

## 2025-04-24 PROCEDURE — 99232 SBSQ HOSP IP/OBS MODERATE 35: CPT | Performed by: HOSPITALIST

## 2025-04-24 RX ORDER — RISPERIDONE 0.25 MG/1
0.25 TABLET, ORALLY DISINTEGRATING ORAL
Status: DISCONTINUED | OUTPATIENT
Start: 2025-04-24 | End: 2025-05-05 | Stop reason: HOSPADM

## 2025-04-24 RX ORDER — RISPERIDONE 0.5 MG/1
0.5 TABLET, ORALLY DISINTEGRATING ORAL
Status: DISCONTINUED | OUTPATIENT
Start: 2025-04-24 | End: 2025-05-05 | Stop reason: HOSPADM

## 2025-04-24 RX ORDER — ATORVASTATIN CALCIUM 10 MG/1
10 TABLET, FILM COATED ORAL
Status: DISCONTINUED | OUTPATIENT
Start: 2025-04-24 | End: 2025-05-05 | Stop reason: HOSPADM

## 2025-04-24 RX ORDER — RISPERIDONE 1 MG/1
1 TABLET, ORALLY DISINTEGRATING ORAL EVERY 8 HOURS PRN
Status: DISCONTINUED | OUTPATIENT
Start: 2025-04-24 | End: 2025-05-05 | Stop reason: HOSPADM

## 2025-04-24 RX ADMIN — AMOXICILLIN AND CLAVULANATE POTASSIUM 1 TABLET: 875; 125 TABLET, FILM COATED ORAL at 08:48

## 2025-04-24 RX ADMIN — NICOTINE 1 PATCH: 21 PATCH, EXTENDED RELEASE TRANSDERMAL at 08:47

## 2025-04-24 RX ADMIN — OXYCODONE HYDROCHLORIDE AND ACETAMINOPHEN 1 TABLET: 5; 325 TABLET ORAL at 10:15

## 2025-04-24 RX ADMIN — OXYCODONE HYDROCHLORIDE AND ACETAMINOPHEN 1 TABLET: 5; 325 TABLET ORAL at 21:05

## 2025-04-24 RX ADMIN — GUAIFENESIN 600 MG: 600 TABLET ORAL at 21:05

## 2025-04-24 RX ADMIN — GUAIFENESIN 600 MG: 600 TABLET ORAL at 08:48

## 2025-04-24 RX ADMIN — CLOZAPINE 350 MG: 25 TABLET ORAL at 21:05

## 2025-04-24 RX ADMIN — PANTOPRAZOLE SODIUM 40 MG: 40 TABLET, DELAYED RELEASE ORAL at 08:47

## 2025-04-24 RX ADMIN — ESCITALOPRAM OXALATE 10 MG: 10 TABLET ORAL at 08:48

## 2025-04-24 RX ADMIN — OMEGA-3 FATTY ACIDS CAP 1000 MG 1000 MG: 1000 CAP at 08:48

## 2025-04-24 RX ADMIN — LAMOTRIGINE 100 MG: 100 TABLET ORAL at 17:05

## 2025-04-24 RX ADMIN — BISACODYL 10 MG: 5 TABLET, COATED ORAL at 08:48

## 2025-04-24 RX ADMIN — FOLIC ACID 1 MG: 1 TABLET ORAL at 08:48

## 2025-04-24 RX ADMIN — Medication 1000 UNITS: at 08:48

## 2025-04-24 RX ADMIN — THIAMINE HCL TAB 100 MG 100 MG: 100 TAB at 08:48

## 2025-04-24 RX ADMIN — LORAZEPAM 0.5 MG: 0.5 TABLET ORAL at 08:48

## 2025-04-24 RX ADMIN — LAMOTRIGINE 100 MG: 100 TABLET ORAL at 08:48

## 2025-04-24 RX ADMIN — OXYBUTYNIN CHLORIDE 5 MG: 5 TABLET ORAL at 17:04

## 2025-04-24 RX ADMIN — CYANOCOBALAMIN TAB 500 MCG 500 MCG: 500 TAB at 08:48

## 2025-04-24 RX ADMIN — BENZTROPINE MESYLATE 1 MG: 1 TABLET ORAL at 21:05

## 2025-04-24 RX ADMIN — FLUTICASONE FUROATE AND VILANTEROL TRIFENATATE 1 PUFF: 100; 25 POWDER RESPIRATORY (INHALATION) at 08:46

## 2025-04-24 RX ADMIN — AMOXICILLIN AND CLAVULANATE POTASSIUM 1 TABLET: 875; 125 TABLET, FILM COATED ORAL at 21:04

## 2025-04-24 RX ADMIN — RISPERIDONE 2 MG: 2 TABLET, FILM COATED ORAL at 08:48

## 2025-04-24 RX ADMIN — OXYBUTYNIN CHLORIDE 5 MG: 5 TABLET ORAL at 08:48

## 2025-04-24 RX ADMIN — HYDROXYZINE HYDROCHLORIDE 100 MG: 50 TABLET, FILM COATED ORAL at 21:05

## 2025-04-24 RX ADMIN — LORAZEPAM 0.5 MG: 0.5 TABLET ORAL at 17:04

## 2025-04-24 RX ADMIN — RISPERIDONE 0.5 MG: 0.5 TABLET, ORALLY DISINTEGRATING ORAL at 13:06

## 2025-04-24 RX ADMIN — ATORVASTATIN CALCIUM 10 MG: 10 TABLET, FILM COATED ORAL at 17:05

## 2025-04-24 RX ADMIN — RISPERIDONE 2 MG: 2 TABLET, FILM COATED ORAL at 17:05

## 2025-04-24 RX ADMIN — BISACODYL 10 MG: 5 TABLET, COATED ORAL at 17:04

## 2025-04-24 NOTE — PROGRESS NOTES
04/24/25    Team Meeting   Meeting Type Daily Rounds   Initial Conference Date 04/24/25   Team Members Present   Team Members Present Physician;Nurse;;Occupational Therapist   Physician Team Member Ronald LARSON, Florecita LARSON, Yair COLEMAN   Nursing Team Member Leo JIMENES   Social Work Team Member Florian HOPSON   OT Team Member Pope BETTYE   Patient/Family Present   Patient Present No   Patient's Family Present No   201, withdrawn to self, endorses anx, visible, reporting stiff legs, stating cogentin ineffective, somatic, sleeping, paranoid, no d/c date med adjust

## 2025-04-24 NOTE — NURSING NOTE
Patient appeared to have slept throughout the night. Respirations even and unlabored. No acute behaviors. Q15 minute checks continue.

## 2025-04-24 NOTE — PLAN OF CARE
Problem: Alteration in Thoughts and Perception  Goal: Agree to be compliant with medication regime, as prescribed and report medication side effects  Description: Interventions:- Offer appropriate PRN medication and supervise ingestion; conduct AIMS, as needed   Outcome: Progressing     Problem: Risk for Self Injury/Neglect  Goal: Refrain from harming self  Description: Interventions:- Monitor patient closely, per order- Develop a trusting relationship- Supervise medication ingestion, monitor effects and side effects   Interventions:- Monitor patient closely, per order- Develop a trusting relationship- Supervise medication ingestion, monitor effects and side effects   Outcome: Progressing     Problem: Alteration in Orientation  Goal: Interact with staff daily  Description: Interventions:- Assess and re-assess patient's level of orientation- Engage patient in 1 on 1 interactions, daily, for a minimum of 15 minutes - Establish rapport/trust with patient   Outcome: Progressing

## 2025-04-24 NOTE — PROGRESS NOTES
"Progress Note - Behavioral Health   Name: Jo-Ann Lamb 54 y.o. female I MRN: [id number]  Unit/Bed#: -01 I Date of Admission: 4/21/2025   Date of Service: 4/24/2025 I Hospital Day: 3        Assessment & Plan  Schizoaffective disorder, bipolar type (HCC)  Patient will be continued on her home medications-  Clozaril 350 mg at bedtime  Risperdal 2 mg twice daily  Lamictal 100 mg twice daily  Ativan 0.5 mg twice daily    Lexapro 10 mg daily started 4/23/2025 for depression  Post-traumatic stress disorder, chronic    LYNN (generalized anxiety disorder)      Risks / Benefits of Treatment:  Risks, benefits, and possible side effects of medications explained to patient and patient verbalizes understanding and agreement for treatment.      Progress Toward Goals: Some improvement.  Less irritable and reports improving depression.  Maintaining safety.  Chronic delusions persists.  Tolerating addition of Lexapro 10 mg daily for depression.  Continue remainder psychotropic regimen as ordered.  No discharge date at this time.      Subjective:    Jo-Ann Continues to endorse chronic paranoid and bizarre delusions regarding hypnosis and people following her.  Describes having command auditory hallucinations prior to admission.  Maintaining safety with no threats or gestures of SI/SIB.  States her sleep is improving, but she wakes up thinking negative and \"irritable.\"  Also adds \"I wake up saying weird things like pig and whore.\"  Responds well to redirection and supportive care provided. Utilizes PRNs frequently for agitation and anxiety.  Some somatic preoccupation present.    Behavior over the last 24 hours: some improvement.   Sleep: improved  Appetite: normal  Medication side effects: No   ROS:  Intermittent leg cramps at night, all other systems negative for acute change    Objective :  Temp:  [97.5 °F (36.4 °C)] 97.5 °F (36.4 °C)  HR:  [93] 93  BP: (135)/(90) 135/90  Resp:  [18] 18  SpO2:  [97 %] 97 %    Mental Status " Evaluation:  Appearance:  age appropriate, casually dressed, wearing robe, glasses, blond hair   Behavior:  Cooperative, isolative to self   Speech:  normal rate, normal volume, normal pitch   Mood:  anxious   Affect:  mood-congruent, redirectable, less irritable   Thought Process:  illogical, circumstantial   Associations: circumstantial associations   Thought Content:  Chronic bizarre and paranoid delusions, some somatic preoccupation   Perceptual Disturbances: Ongoing AH that are commanding and derogatory in nature   Risk Potential: Suicidal ideation - None at present  Homicidal ideation - None  Potential for aggression - No   Sensorium:  oriented to person, place, time/date, and situation   Memory:  recent and remote memory grossly intact   Consciousness:  alert and awake   Attention/Concentration: attention span and concentration are age appropriate   Insight:  limited   Judgment: limited   Gait/Station: normal gait/station, normal balance   Motor Activity: no abnormal movements       Lab Results: I have reviewed the following results:  CMP:   Lab Results   Component Value Date    SODIUM 138 04/22/2025    K 4.2 04/22/2025     04/22/2025    CO2 25 04/22/2025    AGAP 9 04/22/2025    BUN 9 04/22/2025    CREATININE 0.55 (L) 04/22/2025    GLUC 100 04/22/2025    GLUF 88 03/23/2024    CALCIUM 8.9 04/22/2025    AST 12 (L) 04/22/2025    ALT 10 04/22/2025    ALKPHOS 76 04/22/2025    TP 6.6 04/22/2025    ALB 4.1 04/22/2025    TBILI 0.18 (L) 04/22/2025    EGFR 107 04/22/2025     Clozapine:   Lab Results   Component Value Date    CLOZAPINE 144 (L) 04/22/2025     Drug Screen:   Lab Results   Component Value Date    AMPMETHUR Negative 04/21/2025    BARBTUR Negative 04/21/2025    BDZUR Negative 04/21/2025    THCUR Negative 04/21/2025    COCAINEUR Negative 04/21/2025    METHADONEUR Negative 04/21/2025    OPIATEUR Negative 04/21/2025    PCPUR Negative 04/21/2025         Counseling / Coordination of Care:    Patient's  progress discussed with staff in treatment team meeting.  Medication changes reviewed with staff in treatment team meeting.  Patient's progress reviewed with nursing staff.  Medications, treatment progress and treatment plan reviewed with patient.  Medication education provided to patient.  Patient's progress reviewed with case management staff.  Patient's diagnosis and treatment indicated reviewed with patient.  Importance of medication and treatment compliance reviewed with patient.  Educated on importance of medication and treatment compliance.  Discussed with patient acceptance of mental illness diagnosis and need for ongoing treatment after discharge.  Cognitive techniques utilized during the session.  Reassurance and supportive therapy provided.  Reoriented to reality and reassured.  Group attendance encouraged.  Encouraged participation in milieu and group therapy on the unit.      JARED Bolton 04/24/25

## 2025-04-24 NOTE — PROGRESS NOTES
"Progress Note - Jo-Ann Lamb 54 y.o. female MRN: [id number]    Unit/Bed#: [facility] 256-01 Encounter: [id number]        Subjective:   Patient seen and examined at bedside after reviewing the chart and discussing the case with the caring staff.      Patient examined at bedside.  Patient reports cough is better today.  She is reporting muscle stiffness and would like a tetanus vaccine because of this.  Per records last tetanus 8/9/21, no cuts or injuries.  Vaccine not appropriate at this time.     Physical Exam   Vitals: Blood pressure 135/90, pulse 93, temperature 97.5 °F (36.4 °C), temperature source Temporal, resp. rate 18, height 5' 1\" (1.549 m), weight 66.6 kg (146 lb 12.8 oz), last menstrual period 01/01/2020, SpO2 97%, not currently breastfeeding.,Body mass index is 27.74 kg/m².  Constitutional: Patient in no acute distress.  HEENT: PERR, EOMI, MMM.  Cardiovascular: Normal rate and regular rhythm.    Pulmonary/Chest: Effort normal and breath sounds normal.   Abdomen: Soft, + BS, NT.    Assessment/Plan:  Jo-Ann Lamb is a(n) 54 y.o. year old female with Schizoaffective DO     HTN.  No longer taking Losartan 50 mg daily.  Will continue to monitor.   HLD.  Continue fish oil daily.  Restart atorvastatin 10 mg daily 4/24.     Asthma.  Patient is on Breo Ellipta daily (Advair nonform), albuterol inhaler as needed.   Allergic rhinitis.  Stable.  Declines previous Flonase -believes it is causing sore throat.   GERD.  Patient on Protonix 40 mg daily.   Urge incontinence.  Continue oxybutynin 5 mg twice daily.   Constipation/hemorrhoids.  Continue Dulcolax 10 mg twice daily.  Miralax or Senokot as needed.  Apply Anusol-HC as needed.    Chronic back w/ radiculopathy.  Patient follows with pain management.  Patient on Percocet 5-325 mg twice daily as needed for breakthrough pain.  PDMP reviewed.  Patient also receiving epidural injections to lower back outpt.  Tylenol as needed for mild-moderate pain.   Tobacco abuse.  NRT. "   Vitamin D deficiency.  Continue vitamin D3 1000 units daily.  Recheck levels.   Vitamin B12 deficiency.  Continue B12 500 mcg daily.  Recheck levels.   Sore throat/mouth pain.  Cough drops or Magic mouthwash as needed per Dr. Steel.  Chest congestion/cough.  Patient treated with azithromycin for mycoplasma pneumonia 4/10.  Start Augmentin 875-125 mg twice daily x 7 days.  Patient with frequent infections -needs to follow up outpatient.     The patient was discussed with Dr. Steel and he is in agreement with the above note.

## 2025-04-24 NOTE — ASSESSMENT & PLAN NOTE
Patient will be continued on her home medications-  Clozaril 350 mg at bedtime  Risperdal 2 mg twice daily  Lamictal 100 mg twice daily  Ativan 0.5 mg twice daily    Lexapro 10 mg daily started 4/23/2025 for depression

## 2025-04-24 NOTE — NURSING NOTE
Patient pleasant, calm and cooperative, able to make needs known. Is visible on the module, social with peers, attends groups. Denies SI/HI/AVH, anxiety and depression but reports irritability and agitation requesting PRN intervention. Medication compliant. Will continue to monitor.

## 2025-04-24 NOTE — TREATMENT TEAM
04/23/25 2037   Jerry Anxiety Scale   Anxious Mood 3   Tension 3   Fears 3   Insomnia 1   Intellectual 2   Depressed Mood 3   Somatic Complaints: Muscular 3   Somatic Complaints: Sensory 2   Cardiovascular Symptoms 1   Respiratory Symptoms 0   Gastrointestinal Symptoms 0   Genitourinary Symptoms 0   Autonomic Symptoms 2   Behavior at Interview 3   Jerry Anxiety Score 26     Patient endorsing severe anxiety and requesting a PRN; appearing restless and shaky. Atarax 100 mg PO given for a jerry of 26. Will continue to monitor and reassess.

## 2025-04-24 NOTE — NURSING NOTE
Pt calm and cooperative with assessment. Denies SI/HI/AH/VH and pain to back and legs. Compliant with meds. Socializes with peers and staff. Q 15 minute checks ongoing.

## 2025-04-25 PROCEDURE — 99232 SBSQ HOSP IP/OBS MODERATE 35: CPT | Performed by: HOSPITALIST

## 2025-04-25 RX ORDER — OXYCODONE AND ACETAMINOPHEN 5; 325 MG/1; MG/1
1 TABLET ORAL EVERY 8 HOURS PRN
Refills: 0 | Status: DISCONTINUED | OUTPATIENT
Start: 2025-04-25 | End: 2025-05-05 | Stop reason: HOSPADM

## 2025-04-25 RX ADMIN — OXYBUTYNIN CHLORIDE 5 MG: 5 TABLET ORAL at 17:12

## 2025-04-25 RX ADMIN — CYANOCOBALAMIN TAB 500 MCG 500 MCG: 500 TAB at 08:53

## 2025-04-25 RX ADMIN — GUAIFENESIN 600 MG: 600 TABLET ORAL at 20:39

## 2025-04-25 RX ADMIN — BISACODYL 10 MG: 5 TABLET, COATED ORAL at 17:12

## 2025-04-25 RX ADMIN — OXYBUTYNIN CHLORIDE 5 MG: 5 TABLET ORAL at 08:54

## 2025-04-25 RX ADMIN — HYDROXYZINE HYDROCHLORIDE 50 MG: 50 TABLET, FILM COATED ORAL at 15:06

## 2025-04-25 RX ADMIN — LORAZEPAM 0.5 MG: 0.5 TABLET ORAL at 16:20

## 2025-04-25 RX ADMIN — LORAZEPAM 0.5 MG: 0.5 TABLET ORAL at 08:54

## 2025-04-25 RX ADMIN — AMOXICILLIN AND CLAVULANATE POTASSIUM 1 TABLET: 875; 125 TABLET, FILM COATED ORAL at 08:54

## 2025-04-25 RX ADMIN — OXYCODONE HYDROCHLORIDE AND ACETAMINOPHEN 1 TABLET: 5; 325 TABLET ORAL at 20:39

## 2025-04-25 RX ADMIN — RISPERIDONE 1 MG: 1 TABLET, ORALLY DISINTEGRATING ORAL at 14:20

## 2025-04-25 RX ADMIN — ESCITALOPRAM OXALATE 10 MG: 10 TABLET ORAL at 08:53

## 2025-04-25 RX ADMIN — LAMOTRIGINE 100 MG: 100 TABLET ORAL at 08:54

## 2025-04-25 RX ADMIN — NICOTINE 1 PATCH: 21 PATCH, EXTENDED RELEASE TRANSDERMAL at 08:53

## 2025-04-25 RX ADMIN — ATORVASTATIN CALCIUM 10 MG: 10 TABLET, FILM COATED ORAL at 16:20

## 2025-04-25 RX ADMIN — RISPERIDONE 2 MG: 2 TABLET, FILM COATED ORAL at 17:12

## 2025-04-25 RX ADMIN — PANTOPRAZOLE SODIUM 40 MG: 40 TABLET, DELAYED RELEASE ORAL at 08:54

## 2025-04-25 RX ADMIN — BISACODYL 10 MG: 5 TABLET, COATED ORAL at 08:54

## 2025-04-25 RX ADMIN — HYDROXYZINE HYDROCHLORIDE 100 MG: 50 TABLET, FILM COATED ORAL at 20:40

## 2025-04-25 RX ADMIN — RISPERIDONE 2 MG: 2 TABLET, FILM COATED ORAL at 08:54

## 2025-04-25 RX ADMIN — THIAMINE HCL TAB 100 MG 100 MG: 100 TAB at 08:54

## 2025-04-25 RX ADMIN — FOLIC ACID 1 MG: 1 TABLET ORAL at 08:53

## 2025-04-25 RX ADMIN — FLUTICASONE FUROATE AND VILANTEROL TRIFENATATE 1 PUFF: 100; 25 POWDER RESPIRATORY (INHALATION) at 08:53

## 2025-04-25 RX ADMIN — AMOXICILLIN AND CLAVULANATE POTASSIUM 1 TABLET: 875; 125 TABLET, FILM COATED ORAL at 20:39

## 2025-04-25 RX ADMIN — Medication 1000 UNITS: at 08:53

## 2025-04-25 RX ADMIN — BENZTROPINE MESYLATE 1 MG: 1 TABLET ORAL at 20:39

## 2025-04-25 RX ADMIN — OXYCODONE HYDROCHLORIDE AND ACETAMINOPHEN 1 TABLET: 5; 325 TABLET ORAL at 09:27

## 2025-04-25 RX ADMIN — BENZTROPINE MESYLATE 1 MG: 1 TABLET ORAL at 14:20

## 2025-04-25 RX ADMIN — LAMOTRIGINE 100 MG: 100 TABLET ORAL at 17:12

## 2025-04-25 RX ADMIN — CLOZAPINE 350 MG: 25 TABLET ORAL at 20:39

## 2025-04-25 RX ADMIN — OMEGA-3 FATTY ACIDS CAP 1000 MG 1000 MG: 1000 CAP at 08:54

## 2025-04-25 RX ADMIN — GUAIFENESIN 600 MG: 600 TABLET ORAL at 08:56

## 2025-04-25 NOTE — NURSING NOTE
Pt calm and cooperative with assessment. Denies SI/HI/AH/VH and pain. Compliant with meds. Socializes with peers and staff. Does display an irritable edge. Q 15 minute checks ongoing.

## 2025-04-25 NOTE — PROGRESS NOTES
"Progress Note - Behavioral Health   Name: Jo-Ann Lamb 54 y.o. female I MRN: [id number]  Unit/Bed#: -01 I Date of Admission: 4/21/2025   Date of Service: 4/25/2025 I Hospital Day: 4        Assessment & Plan  Schizoaffective disorder, bipolar type (HCC)  Patient will be continued on her home medications-  Clozaril 350 mg at bedtime  Risperdal 2 mg twice daily  Lamictal 100 mg twice daily  Ativan 0.5 mg twice daily  Lexapro 10 mg daily started 4/23/2025 for depression  Repeat Clozaril Level 4/26/25@ 6 AM  Post-traumatic stress disorder, chronic    LYNN (generalized anxiety disorder)      Risks / Benefits of Treatment:  Risks, benefits, and possible side effects of medications explained to patient and patient verbalizes understanding and agreement for treatment.      Progress Toward Goals: Some improvement.  Reports improving depression and anxiety.  Describes mood as \"motivated.\"  Maintaining safety with no return of SI.  Showing some insight regarding alcohol use.  Chronic delusions persist.  Continue remainder psychotropic regimen as ordered.  Plan is to repeat Clozaril level tomorrow morning and optimize for ongoing symptoms of psychosis; believe patient may have had intermittent compliance in the outpatient setting leading to psychiatric decompensation.  No discharge date at this time.    Subjective:    Jo-Ann is visible in the milieu and attending unit activities.  Maintaining ADLs and adequate sleep.  States she is feeling \"much better today\" and attributes to the Lexapro.  States she is no longer thinking negatively and denies further feelings of anhedonia.  Calm and cooperative during encounter.  Continues to endorse bizarre and paranoid delusions that are chronic in nature and present at baseline.  Responds well to redirection.  Showing some insight regarding substance use contributing to psychiatric decompensation.  Safety with no return of SI.    Behavior over the last 24 hours: Improving.   Sleep: " "improved  Appetite: normal  Medication side effects: No   ROS:  No complaints, all other systems negative for acute change    Objective :  Temp:  [97.4 °F (36.3 °C)-97.5 °F (36.4 °C)] 97.5 °F (36.4 °C)  HR:  [82-91] 82  BP: (140-163)/(78-98) 163/98  Resp:  [18] 18  SpO2:  [95 %] 95 %  O2 Device: None (Room air)    Mental Status Evaluation:  Appearance:  age appropriate, casually dressed, wearing robe, glasses, blond hair   Behavior:  Cooperative, isolative to self   Speech:  normal rate, normal volume, normal pitch   Mood:  \"I feel much better today\"   Affect:  mood-congruent, redirectable, less irritable   Thought Process:  goal directed   Associations: circumstantial associations   Thought Content:  Chronic bizarre and paranoid delusions, less somatic   Perceptual Disturbances: Decreased/improving AH, noncommanding   Risk Potential: Suicidal ideation - None at present  Homicidal ideation - None  Potential for aggression - No   Sensorium:  oriented to person, place, time/date, and situation   Memory:  recent and remote memory grossly intact   Consciousness:  alert and awake   Attention/Concentration: attention span and concentration are age appropriate   Insight:  limited   Judgment: limited   Gait/Station: normal gait/station, normal balance   Motor Activity: no abnormal movements       Lab Results: I have reviewed the following results:  CMP:   Lab Results   Component Value Date    SODIUM 138 04/22/2025    K 4.2 04/22/2025     04/22/2025    CO2 25 04/22/2025    AGAP 9 04/22/2025    BUN 9 04/22/2025    CREATININE 0.55 (L) 04/22/2025    GLUC 100 04/22/2025    GLUF 88 03/23/2024    CALCIUM 8.9 04/22/2025    AST 12 (L) 04/22/2025    ALT 10 04/22/2025    ALKPHOS 76 04/22/2025    TP 6.6 04/22/2025    ALB 4.1 04/22/2025    TBILI 0.18 (L) 04/22/2025    EGFR 107 04/22/2025     Clozapine:   Lab Results   Component Value Date    CLOZAPINE 144 (L) 04/22/2025     Drug Screen:   Lab Results   Component Value Date    " AMPMETHUR Negative 04/21/2025    BARBTUR Negative 04/21/2025    BDZUR Negative 04/21/2025    THCUR Negative 04/21/2025    COCAINEUR Negative 04/21/2025    METHADONEUR Negative 04/21/2025    OPIATEUR Negative 04/21/2025    PCPUR Negative 04/21/2025       Counseling / Coordination of Care:    Patient's progress discussed with staff in treatment team meeting.  Medication changes reviewed with staff in treatment team meeting.  Patient's progress reviewed with nursing staff.  Medications, treatment progress and treatment plan reviewed with patient.  Medication education provided to patient.  Patient's progress reviewed with case management staff.  Patient's diagnosis and treatment indicated reviewed with patient.  Importance of medication and treatment compliance reviewed with patient.  Educated on importance of medication and treatment compliance.  Discussed with patient acceptance of mental illness diagnosis and need for ongoing treatment after discharge.  Cognitive techniques utilized during the session.  Reassurance and supportive therapy provided.  Reoriented to reality and reassured.  Group attendance encouraged.  Encouraged participation in milieu and group therapy on the unit.      JARED Bolton 04/25/25

## 2025-04-25 NOTE — PROGRESS NOTES
"Progress Note - Jo-Ann Lamb 54 y.o. female MRN: [id number]    Unit/Bed#: [facility] 218-01 Encounter: [id number]        Subjective:   Patient seen and examined at bedside after reviewing the chart and discussing the case with the caring staff.      Patient examined at bedside.  Patient complaining of sore throat.  She would also like her Percocet dose changed to every 8h as needed as that's what she was taking outpatient.     Physical Exam   Vitals: Blood pressure 163/98, pulse 82, temperature 97.5 °F (36.4 °C), temperature source Temporal, resp. rate 18, height 5' 1\" (1.549 m), weight 66.6 kg (146 lb 12.8 oz), last menstrual period 01/01/2020, SpO2 95%, not currently breastfeeding.,Body mass index is 27.74 kg/m².  Constitutional: Patient in no acute distress.  HEENT: PERR, EOMI, MMM.  Cardiovascular: Normal rate and regular rhythm.    Pulmonary/Chest: Effort normal and breath sounds normal.   Abdomen: Soft, + BS, NT.    Assessment/Plan:  Jo-Ann Lamb is a(n) 54 y.o. year old female with Schizoaffective DO     HTN.  No longer taking Losartan 50 mg daily.  Will continue to monitor.   HLD.  Continue fish oil daily.  Restart atorvastatin 10 mg daily 4/24.     Asthma.  Patient is on Breo Ellipta daily (Advair nonform), albuterol inhaler as needed.   Allergic rhinitis.  Stable.  Declines previous Flonase -believes it is causing sore throat.   GERD.  Patient on Protonix 40 mg daily.   Urge incontinence.  Continue oxybutynin 5 mg twice daily.   Constipation/hemorrhoids.  Continue Dulcolax 10 mg twice daily.  Miralax or Senokot as needed.  Apply Anusol-HC as needed.    Chronic back w/ radiculopathy.  Patient follows with pain management.  Patient on Percocet 5-325 mg q8h as needed for breakthrough pain.  PDMP reviewed.  Patient also receiving epidural injections to lower back outpt.  Tylenol as needed for mild-moderate pain.   Tobacco abuse.  NRT.   Vitamin D deficiency.  Continue vitamin D3 1000 units daily.  Recheck levels. "   Vitamin B12 deficiency.  Continue B12 500 mcg daily.  Recheck levels.   Sore throat/mouth pain.  Cough drops or Magic mouthwash as needed per Dr. Steel.  Chloraseptic spray as needed 4/25.   Chest congestion/cough.  Patient treated with azithromycin for mycoplasma pneumonia 4/10.  Start Augmentin 875-125 mg twice daily x 7 days.  Patient with frequent infections -needs to follow up outpatient.     The patient was discussed with Dr. Steel and he is in agreement with the above note.

## 2025-04-25 NOTE — ASSESSMENT & PLAN NOTE
Patient will be continued on her home medications-  Clozaril 350 mg at bedtime  Risperdal 2 mg twice daily  Lamictal 100 mg twice daily  Ativan 0.5 mg twice daily  Lexapro 10 mg daily started 4/23/2025 for depression  Repeat Clozaril Level 4/26/25@ 6 AM

## 2025-04-25 NOTE — TREATMENT TEAM
"   04/25/25 1420   Broset Violence Checklist   Assessment type Shift   Irritability 1   Confusion 1   Boisterousness 1   Threatening physical violence 0   Verbal threats 0   Violence 0   Broset score 3   Agitated Behavior Scale   Short Attention Span, Easy Distractibility, Inability to Concentrate 4   Impulsive, Impatient, Low Tolerance for Pain or Frustration 4   Uncooperative, Resistant to Care, Demanding 4   Violent and/or Threatening Violence Toward People or Property 4   Explosive and/or Unpredictable Anger 4   Rocking, Rubbing, Moaning, Other Self-Stimulating Behavior 4   Pulling at Tubes, Restraints, etc. 1   Wandering from Treatment Area 3   Restlessness, Pacing, Excessive Movement 3   Repetitive Behaviors, Motor and/or Verbal 3   Rapid, Loud or Excessive Talking 3   Sudden Changes of Mood 2   Easily Initiated - Excessive Crying and/or Laughter 3   Self-Abusiveness, Physical and/or Verbal 3   Agitated Behavior Scale Total Score  45     Patient presented to nurse station requesting \"Risperdal.\" Patient stated, \"These people are doing hypinosis on me and it is making me angry. Send them to long term.\" Patient loud and cursing at nurses station. Risperdal 1 mg PO given for Broset 1, Agitates Behavioral Scale 45. Will reassess.  "

## 2025-04-25 NOTE — NURSING NOTE
Patient continues to endorse anxiety and agitation related to AH and intrusive thoughts. Pacing the halls and irritable in conversation. Scheduled Ativan 0.5 mg given at 1620. Will continue to monitor.

## 2025-04-25 NOTE — PLAN OF CARE
Problem: Anxiety  Goal: Anxiety is at manageable level  Description: Interventions:- Assess and monitor patient's anxiety level. - Monitor for signs and symptoms (heart palpitations, chest pain, shortness of breath, headaches, nausea, feeling jumpy, restlessness, irritable, apprehensive). - Collaborate with interdisciplinary team and initiate plan and interventions as ordered.- Hunter patient to unit/surroundings- Explain treatment plan- Encourage participation in care- Encourage verbalization of concerns/fears- Identify coping mechanisms- Assist in developing anxiety-reducing skills- Administer/offer alternative therapies- Limit or eliminate stimulants  Interventions:- Assess and monitor patient's anxiety level. - Monitor for signs and symptoms (heart palpitations, chest pain, shortness of breath, headaches, nausea, feeling jumpy, restlessness, irritable, apprehensive). - Collaborate with interdisciplinary team and initiate plan and interventions as ordered.- Hunter patient to unit/surroundings- Explain treatment plan- Encourage participation in care- Encourage verbalization of concerns/fears- Identify coping mechanisms- Assist in developing anxiety-reducing skills- Administer/offer alternative therapies- Limit or eliminate stimulants  Outcome: Progressing     Problem: Depression  Goal: Complete daily ADLs, including personal hygiene independently, as able  Description: Interventions:- Observe, teach, and assist patient with ADLS- Monitor and promote a balance of rest/activity, with adequate nutrition and elimination   Interventions:- Observe, teach, and assist patient with ADLS- Monitor and promote a balance of rest/activity, with adequate nutrition and elimination  Interventions:- Observe, teach, and assist patient with ADLS-  Monitor and promote a balance of rest/activity, with adequate nutrition and elimination   Interventions:- Observe, teach, and assist patient with ADLS  Outcome: Progressing

## 2025-04-25 NOTE — NUTRITION
"   04/25/25 1046   Biochemical Data,Medical Tests, and Procedures   Biochemical Data/Medical Tests/Procedures Lab values reviewed;Meds reviewed   Labs (Comment) 4/22/2025 creatinine 0.55, AST 12, total bilirubin 0.18, cholesterol 221, triglycerides 230,    Meds (Comment) Atorvastatin, vitamin D3, vitamin B12, fish oil, folic acid, Lamictal, Protonix, risperidone, thiamine   Nutrition-Focused Physical Exam   Nutrition-Focused Physical Exam Findings RN skin assessment reviewed;No edema documented;No skin issues documented   Nutrition-Focused Physical Exam Findings No areas of muscle/adipose loss noted   Medical-Related Concerns schizoaffective disorder, generalized anxiety disorder, PTSD, hyperlipidemia, GERD, vitamin D deficiency, mild neurocognitive disorder   Adequacy of Intake   Nutrition Modality PO   Feeding Route   PO Independent   Current PO Intake   Current Diet Order Regular diet, thin liquids   Current Meal Intake 25-50%;50-75%;%   Estimated calorie intake compared to estimated need Meal completion %, mostly 100%.  Nutrient needs are met.   PES Statement   Problem No nutrition diagnosis   Recommendations/Interventions   Malnutrition/BMI Present No   Summary Length of stay.  Presents from assisted living facility.  Past medical history significant for schizoaffective disorder, generalized anxiety disorder, PTSD, hyperlipidemia, GERD, vitamin D deficiency, mild neurocognitive disorder.  Weight history reviewed.  No significant changes.  No edema.  No pressure areas.  Prescribed a regular diet, thin liquids.  Meal completion %, mostly 100%.  Nutrient needs are met.  Patient reports having a so-so appetite.  Usually has 3 meals daily, \"I do enough to get by it.\"  Reports following a diet low in fat.  Consumes foods such as yogurt, fruit, cottage cheese, turkey sandwiches, egg salad, hot ham and cheese sandwich, burritos, pizza.  Avoids spices.  Denies difficulty chewing.  Endorses some " difficulty swallowing with 2 big of bites.  Utilizes liquid wash as needed.  Patient cooks and grocery shops.  Reports her weight is stable at 145#-154#.  Reports efforts for weight loss, decreasing portion sizes.  RD discussed diet as prescribed.  Patient requests Ensure clear.  Discussed with patient weight stability and adequate meal intake, ONS not appropriate at this time.  She is agreeable and offers no additional questions.  RD to follow as needed.   Interventions/Recommendations Continue current diet order   Education Assessment   Education Education not indicated at this time   Nutrition Complexity Risk   Nutrition complexity level Low risk   Follow up date 05/09/25

## 2025-04-25 NOTE — PROGRESS NOTES
04/25/25   Team Meeting   Meeting Type Daily Rounds   Initial Conference Date 04/25/25   Team Members Present   Team Members Present Physician;Nurse;;Occupational Therapist   Physician Team Member Florecita Cardenas Medei   Nursing Team Member Mirella   Care Management Team Member Gordon Du   OT Team Member    Patient/Family Present   Patient Present No   Patient's Family Present No   201, return to [facility] with [facility], somatic, paranoid, irritable, edge, repeat labs tomorrow for Clozaril, d/c Wed

## 2025-04-25 NOTE — TREATMENT TEAM
"   04/25/25 1505   Martines Anxiety Scale   Anxious Mood 3   Tension 3   Fears 0   Insomnia 2   Intellectual 2   Depressed Mood 2   Somatic Complaints: Muscular 2   Somatic Complaints: Sensory 2   Cardiovascular Symptoms 0   Respiratory Symptoms 0   Gastrointestinal Symptoms 0   Genitourinary Symptoms 0   Autonomic Symptoms 3   Behavior at Interview 3   Martines Anxiety Score 22     Patient presented to nurses station anxious and stating, \"what did they drug me with.\" Staff support provided and patient able to redirect. Atarax 50 mg PO given for Ham 22.   "

## 2025-04-25 NOTE — NURSING NOTE
"Patient pleasant and cooperative with staff. Denies SI/HI. Reports ongoing AH and intrusive thoughts, \"I feel like they have control over me.\" Patient reports \"I feel delusional, I know they are messing with my mind.\" Patient expressing grandiose delusions, \"I know things about people, scary things, illegal things, but I am not saying.\" Patient offered good insight, \"I am here to get help. I can tell my mind is not right.\" Reports she has been experiencing mild flashbacks of past trauma when she was in her teenage years. Staff support provided. Visible on the unit, withdrawn to self. At nurses station for needs. Compliant with scheduled medications. Will continue to monitor. Continual q15 min rounding and safety checks in place.   "

## 2025-04-25 NOTE — PROGRESS NOTES
Met with Jo-Ann completed her relapse prevention. She was able to identify her protective factors, warning signs, stressors, coping skills and support people. We discussed the community supports.

## 2025-04-25 NOTE — NURSING NOTE
Patient reports ongoing agitation and anxiety related to AH and intrusive thoughts. Patient reports no improvement from medications. Terrell yelling out at the voices in her room. Risperdal 1 mg PO and Atarax 50 mg PO not effective.

## 2025-04-25 NOTE — PLAN OF CARE
Problem: Ineffective Coping  Goal: Participates in unit activities  Description: Interventions:- Provide therapeutic environment - Provide required programming - Redirect inappropriate behaviors   Outcome: Progressing     Problem: Risk for Self Injury/Neglect  Goal: Attend and participate in unit activities, including therapeutic, recreational, and educational groups  Description: Interventions:- Provide therapeutic and educational activities daily, encourage attendance and participation, and document same in the medical record- Obtain collateral information, encourage visitation and family involvement in care   Interventions:- Provide therapeutic and educational activities daily, encourage attendance and participation, and document same in the medical record - Provide appropriate opportunities for reminiscence - Provide a consistent daily routine - Encourage family contact/visitation   Outcome: Progressing     Problem: Depression  Goal: Attend and participate in unit activities, including therapeutic, recreational, and educational groups  Description: Interventions:- Provide therapeutic and educational activities daily, encourage attendance and participation, and document same in the medical record   Outcome: Progressing     Problem: Alteration in Orientation  Goal: Attend and participate in unit activities, including therapeutic, recreational, and educational groups  Description: Interventions:- Provide therapeutic and educational activities daily, encourage attendance and participation, and document same in the medical record- Obtain collateral information, encourage visitation and family involvement in care   Interventions:- Provide therapeutic and educational activities daily, encourage attendance and participation, and document same in the medical record - Provide appropriate opportunities for reminiscence - Provide a consistent daily routine - Encourage family contact/visitation   Outcome: Progressing   Pt  does attend groups but her attention and concentration is poor, she sometimes leaves in the middle of group

## 2025-04-26 LAB — CLOZAPINE SERPL-MCNC: 655 NG/ML (ref 350–900)

## 2025-04-26 PROCEDURE — 99232 SBSQ HOSP IP/OBS MODERATE 35: CPT | Performed by: HOSPITALIST

## 2025-04-26 PROCEDURE — 80159 DRUG ASSAY CLOZAPINE: CPT | Performed by: NURSE PRACTITIONER

## 2025-04-26 RX ADMIN — AMOXICILLIN AND CLAVULANATE POTASSIUM 1 TABLET: 875; 125 TABLET, FILM COATED ORAL at 09:09

## 2025-04-26 RX ADMIN — PANTOPRAZOLE SODIUM 40 MG: 40 TABLET, DELAYED RELEASE ORAL at 09:10

## 2025-04-26 RX ADMIN — GUAIFENESIN 600 MG: 600 TABLET ORAL at 09:09

## 2025-04-26 RX ADMIN — RISPERIDONE 2 MG: 2 TABLET, FILM COATED ORAL at 09:09

## 2025-04-26 RX ADMIN — RISPERIDONE 0.5 MG: 0.5 TABLET, ORALLY DISINTEGRATING ORAL at 12:00

## 2025-04-26 RX ADMIN — BENZTROPINE MESYLATE 1 MG: 1 TABLET ORAL at 21:12

## 2025-04-26 RX ADMIN — THIAMINE HCL TAB 100 MG 100 MG: 100 TAB at 09:09

## 2025-04-26 RX ADMIN — FLUTICASONE FUROATE AND VILANTEROL TRIFENATATE 1 PUFF: 100; 25 POWDER RESPIRATORY (INHALATION) at 09:10

## 2025-04-26 RX ADMIN — ATORVASTATIN CALCIUM 10 MG: 10 TABLET, FILM COATED ORAL at 15:30

## 2025-04-26 RX ADMIN — GUAIFENESIN 600 MG: 600 TABLET ORAL at 21:08

## 2025-04-26 RX ADMIN — CLOZAPINE 350 MG: 25 TABLET ORAL at 21:08

## 2025-04-26 RX ADMIN — BENZTROPINE MESYLATE 1 MG: 1 TABLET ORAL at 12:00

## 2025-04-26 RX ADMIN — LAMOTRIGINE 100 MG: 100 TABLET ORAL at 17:13

## 2025-04-26 RX ADMIN — OXYCODONE HYDROCHLORIDE AND ACETAMINOPHEN 1 TABLET: 5; 325 TABLET ORAL at 21:12

## 2025-04-26 RX ADMIN — LORAZEPAM 0.5 MG: 0.5 TABLET ORAL at 16:21

## 2025-04-26 RX ADMIN — LORAZEPAM 0.5 MG: 0.5 TABLET ORAL at 09:09

## 2025-04-26 RX ADMIN — NICOTINE 1 PATCH: 21 PATCH, EXTENDED RELEASE TRANSDERMAL at 09:09

## 2025-04-26 RX ADMIN — CYANOCOBALAMIN TAB 500 MCG 500 MCG: 500 TAB at 09:09

## 2025-04-26 RX ADMIN — LAMOTRIGINE 100 MG: 100 TABLET ORAL at 09:09

## 2025-04-26 RX ADMIN — OXYCODONE HYDROCHLORIDE AND ACETAMINOPHEN 1 TABLET: 5; 325 TABLET ORAL at 09:11

## 2025-04-26 RX ADMIN — PHENOL 1 SPRAY: 1.5 LIQUID ORAL at 15:28

## 2025-04-26 RX ADMIN — RISPERIDONE 2 MG: 2 TABLET, FILM COATED ORAL at 17:12

## 2025-04-26 RX ADMIN — AMOXICILLIN AND CLAVULANATE POTASSIUM 1 TABLET: 875; 125 TABLET, FILM COATED ORAL at 21:07

## 2025-04-26 RX ADMIN — FOLIC ACID 1 MG: 1 TABLET ORAL at 09:09

## 2025-04-26 RX ADMIN — HYDROXYZINE HYDROCHLORIDE 100 MG: 50 TABLET, FILM COATED ORAL at 21:12

## 2025-04-26 RX ADMIN — OXYBUTYNIN CHLORIDE 5 MG: 5 TABLET ORAL at 09:09

## 2025-04-26 RX ADMIN — BISACODYL 10 MG: 5 TABLET, COATED ORAL at 09:09

## 2025-04-26 RX ADMIN — ESCITALOPRAM OXALATE 10 MG: 10 TABLET ORAL at 09:09

## 2025-04-26 RX ADMIN — OMEGA-3 FATTY ACIDS CAP 1000 MG 1000 MG: 1000 CAP at 09:09

## 2025-04-26 RX ADMIN — Medication 1000 UNITS: at 09:09

## 2025-04-26 RX ADMIN — OXYBUTYNIN CHLORIDE 5 MG: 5 TABLET ORAL at 17:12

## 2025-04-26 NOTE — NURSING NOTE
"Pt approached nursing requesting PRN for increased agitation and hallucinations. Pt appears angry and pacing the hallway yelling and accusing staff stating \"Why do you put medications in our food? That's illegal and you are all going to intermediate. You force me to eat and I don't have a problem with eating\". Broset scale completed with a score of 2/moderate. PRN Risperdal ODT 0.5 mg PO given at 1200. Will monitor for effectiveness.    PRN Cogentin 1 mg PO given at 1200 for pt's reported muscle stiffness and rigidity.   "

## 2025-04-26 NOTE — PLAN OF CARE
Problem: Risk for Self Injury/Neglect  Goal: Verbalize thoughts and feelings  Description: Interventions:- Assess and re-assess patient's lethality and potential for self-injury- Engage patient in 1:1 interactions, daily, for a minimum of 15 minutes- Encourage patient to express feelings, fears, frustrations, hopes- Establish rapport/trust with patient   Outcome: Progressing     Problem: Depression  Goal: Refrain from self-neglect  Outcome: Progressing

## 2025-04-26 NOTE — PROGRESS NOTES
"Progress Note - Jo-Ann Lamb 54 y.o. female MRN: [id number]    Unit/Bed#: [facility] 218-01 Encounter: [id number]        Subjective:   Patient seen and examined at bedside after reviewing the chart and discussing the case with the caring staff.      Patient examined at bedside.  Patient continues to complain of sore throat.     Physical Exam   Vitals: Blood pressure 115/80, pulse (!) 106, temperature 97.7 °F (36.5 °C), temperature source Temporal, resp. rate 18, height 5' 1\" (1.549 m), weight 66.6 kg (146 lb 12.8 oz), last menstrual period 01/01/2020, SpO2 98%, not currently breastfeeding.,Body mass index is 27.74 kg/m².  Constitutional: Patient in no acute distress.  HEENT: PERR, EOMI, MMM.  Cardiovascular: Normal rate and regular rhythm.    Pulmonary/Chest: Effort normal and breath sounds normal.   Abdomen: Soft, + BS, NT.    Assessment/Plan:  Jo-Ann Lamb is a(n) 54 y.o. year old female with Schizoaffective DO     HTN.  No longer taking Losartan 50 mg daily.  Will continue to monitor.   HLD.  Continue fish oil daily.  Restart atorvastatin 10 mg daily 4/24.     Asthma.  Patient is on Breo Ellipta daily (Advair nonform), albuterol inhaler as needed.   Allergic rhinitis.  Stable.  Declines previous Flonase -believes it is causing sore throat.   GERD.  Patient on Protonix 40 mg daily.   Urge incontinence.  Continue oxybutynin 5 mg twice daily.   Constipation/hemorrhoids.  Continue Dulcolax 10 mg twice daily.  Miralax or Senokot as needed.  Apply Anusol-HC as needed.    Chronic back w/ radiculopathy.  Patient follows with pain management.  Patient on Percocet 5-325 mg q8h as needed for breakthrough pain.  PDMP reviewed.  Patient also receiving epidural injections to lower back outpt.  Tylenol as needed for mild-moderate pain.   Tobacco abuse.  NRT.   Vitamin D deficiency.  Continue vitamin D3 1000 units daily.  Recheck levels.   Vitamin B12 deficiency.  Continue B12 500 mcg daily.  Recheck levels.   Sore throat/mouth pain.  " Cough drops or Magic mouthwash as needed per Dr. Steel.  Chloraseptic spray as needed 4/25.   Chest congestion/cough.  Patient treated with azithromycin for mycoplasma pneumonia 4/10.  Start Augmentin 875-125 mg twice daily x 7 days.  Patient with frequent infections -needs to follow up outpatient.

## 2025-04-26 NOTE — ASSESSMENT & PLAN NOTE
Patient will be continued on her home medications-  Clozaril 350 mg at bedtime  Risperdal 2 mg twice daily  Lamictal 100 mg twice daily  Ativan 0.5 mg twice daily  Lexapro 10 mg daily started 4/23/2025 for depression  Repeat Clozaril Level 4/26/25@ 6 AM- will increase Clozaril if room to move up with level

## 2025-04-26 NOTE — PROGRESS NOTES
"Progress Note - Behavioral Health     Jo-Ann Lamb 54 y.o. female MRN: [id number]   Unit/Bed#: [facility] 218-01 Encounter: [id number]    Assessment & Plan  Schizoaffective disorder, bipolar type (HCC)  Patient will be continued on her home medications-  Clozaril 350 mg at bedtime  Risperdal 2 mg twice daily  Lamictal 100 mg twice daily  Ativan 0.5 mg twice daily  Lexapro 10 mg daily started 4/23/2025 for depression  Repeat Clozaril Level 4/26/25@ 6 AM- will increase Clozaril if room to move up with level  Post-traumatic stress disorder, chronic    LYNN (generalized anxiety disorder)         Risks / Benefits of Treatment:    Risks, benefits, and possible side effects of medications explained to patient and patient verbalizes understanding and agreement for treatment.  Behavior over the last 24 hours: unchanged.     Jo-Ann CUMMINGS seen today, per staff report has been paranoid on the unit.  Had an episode yesterday evening of yelling and responding to auditory hallucinations in her room.  This morning patient reports feeling suspicious, questions \"what if there is somebody after me\".  Patient given reassurance and supportive feedback.  Appears fearful.      Mental Status Evaluation:    Appearance:  casually dressed, adequate grooming, looks stated age   Behavior:  cooperative, guarded   Speech:  slow, scant   Mood:  dysphoric, anxious   Affect:  blunted   Thought Process:  illogical, perseverative   Associations: circumstantial associations   Thought Content:  paranoid delusions   Perceptual Disturbances: auditory hallucinations   Risk Potential: Suicidal ideation - None  Homicidal ideation - None   Sensorium:  oriented to person, place, and time/date   Memory:  recent and remote memory grossly intact   Consciousness:  alert and awake   Attention: decreased concentration and decreased attention span   Insight:  limited   Judgment: limited   Gait/Station: normal gait/station   Motor Activity: no abnormal movements     Vital signs in " last 24 hours:    Temp:  [97 °F (36.1 °C)-97.4 °F (36.3 °C)] 97 °F (36.1 °C)  HR:  [95-98] 95  BP: (107-125)/(86-87) 125/86  Resp:  [18] 18  SpO2:  [95 %-96 %] 96 %  O2 Device: None (Room air)    Laboratory results: I have personally reviewed all pertinent laboratory/tests results.    All current active medications have been reviewed  Current Facility-Administered Medications   Medication Dose Route Frequency Provider Last Rate    acetaminophen  650 mg Oral Q6H PRN Taylor Castro MD      acetaminophen  650 mg Oral Q4H PRN Taylor Castro MD      acetaminophen  975 mg Oral Q6H PRN Taylor Castro MD      albuterol  2 puff Inhalation Q4H PRN Jose Steel MD      aluminum-magnesium hydroxide-simethicone  30 mL Oral Q4H PRN Taylor Castro MD      amoxicillin-clavulanate  1 tablet Oral Q12H KENNY Zandra Barclay PA-C      atorvastatin  10 mg Oral Daily With Dinner Zandra Barclay PA-C      benztropine  1 mg Intramuscular Q4H PRN Max 6/day Taylor Castro MD      benztropine  1 mg Oral Q4H PRN Max 6/day Taylor Castro MD      bisacodyl  10 mg Oral BID Leny Cardenas MD      bisacodyl  10 mg Rectal Daily PRN Taylor Castro MD      Cholecalciferol  1,000 Units Oral Daily Jose Steel MD      cloZAPine  350 mg Oral HS Leny Cardenas MD      vitamin B-12  500 mcg Oral Daily Jose Steel MD      hydrOXYzine HCL  50 mg Oral Q6H PRN Max 4/day Taylor Castro MD      Or    diphenhydrAMINE  50 mg Intramuscular Q6H PRN Taylor Castro MD      diphenhydramine, lidocaine, Al/Mg hydroxide, simethicone  10 mL Swish & Swallow Q4H PRN Jose Steel MD      escitalopram  10 mg Oral Daily Leny Cardenas MD      fish oil  1,000 mg Oral Daily Jose Steel MD      Fluticasone Furoate-Vilanterol  1 puff Inhalation Daily Jose Steel MD      folic acid  1 mg Oral Daily Jose Damián, MD      guaiFENesin  600 mg Oral Q12H KENNY Jose Damián, MD      hydrocortisone   Topical 4x Daily PRN Jose Steel MD       hydrOXYzine HCL  100 mg Oral Q6H PRN Max 4/day Taylor Castro MD      Or    LORazepam  2 mg Intramuscular Q6H PRN Taylor Castro MD      hydrOXYzine HCL  25 mg Oral Q6H PRN Max 4/day Taylor Castro MD      lamoTRIgine  100 mg Oral BID Leny Cardenas MD      LORazepam  0.5 mg Oral BID Leny Cardensa MD      nicotine  1 patch Transdermal Daily Jose Steel MD      nicotine polacrilex  4 mg Oral Q2H PRN Taylor Castro MD      ondansetron  4 mg Oral Q6H PRN Jose Steel MD      oxybutynin  5 mg Oral BID Jose Steel MD      oxyCODONE-acetaminophen  1 tablet Oral Q8H PRN FAISAL Willis-C      pantoprazole  40 mg Oral Daily Before Breakfast Jose Steel MD      phenol  1 spray Mouth/Throat Q2H PRN FAISAL Willis-C      polyethylene glycol  17 g Oral Daily PRN Taylor Castro MD      propranolol  10 mg Oral Q8H PRN Taylor Castro MD      risperiDONE  0.25 mg Oral Q6H PRN Max 4/day Bri M Medei, CRNP      risperiDONE  0.5 mg Oral Q6H PRN Max 4/day Bri M Medei, CRNP      risperiDONE  1 mg Oral Q8H PRN Bri SAVANNAH Medei, CRNP      risperiDONE  2 mg Oral BID Leny Cardenas MD      senna-docusate sodium  1 tablet Oral Daily PRN Taylor Castro MD      Thiamine Mononitrate  100 mg Oral Daily Jose Steel MD      traZODone  50 mg Oral HS PRN Taylor Castro MD         Counseling / Coordination of Care:    Patient's progress reviewed with nursing staff.  Medications, treatment progress and treatment plan reviewed with patient.  Medication changes discussed with patient.  Supportive therapy provided to patient.    Leny Cardenas MD 04/26/25

## 2025-04-26 NOTE — NURSING NOTE
Pt calm and cooperative with assessment. Denies SI/HI/VH and pain. Compliant with meds. Socializes with peers and staff. Q 15 minute checks ongoing.

## 2025-04-26 NOTE — NURSING NOTE
Patient compliant with meds and meals. Patient is visible, social with select peers. Patient is paranoid at time but able to redirect and question her thought process and ask if people would hypnotize her to make her do things to her self. Patient pleasant and cooperative able to use coping skills. Q 15 min behavioral and safety checks in place.

## 2025-04-27 PROCEDURE — 99232 SBSQ HOSP IP/OBS MODERATE 35: CPT | Performed by: HOSPITALIST

## 2025-04-27 RX ORDER — BISACODYL 5 MG/1
10 TABLET, DELAYED RELEASE ORAL DAILY
Status: DISCONTINUED | OUTPATIENT
Start: 2025-04-28 | End: 2025-04-29

## 2025-04-27 RX ORDER — CLOZAPINE 100 MG/1
400 TABLET ORAL
Status: DISCONTINUED | OUTPATIENT
Start: 2025-04-27 | End: 2025-04-28

## 2025-04-27 RX ORDER — LIDOCAINE 50 MG/G
1 PATCH TOPICAL DAILY
Status: DISCONTINUED | OUTPATIENT
Start: 2025-04-27 | End: 2025-05-05 | Stop reason: HOSPADM

## 2025-04-27 RX ORDER — RISPERIDONE 2 MG/1
2 TABLET ORAL 2 TIMES DAILY
Status: DISCONTINUED | OUTPATIENT
Start: 2025-04-27 | End: 2025-05-01

## 2025-04-27 RX ORDER — LORAZEPAM 0.5 MG/1
0.5 TABLET ORAL 2 TIMES DAILY
Status: DISCONTINUED | OUTPATIENT
Start: 2025-04-27 | End: 2025-05-05 | Stop reason: HOSPADM

## 2025-04-27 RX ADMIN — OXYBUTYNIN CHLORIDE 5 MG: 5 TABLET ORAL at 18:29

## 2025-04-27 RX ADMIN — GUAIFENESIN 600 MG: 600 TABLET ORAL at 09:28

## 2025-04-27 RX ADMIN — LORAZEPAM 0.5 MG: 0.5 TABLET ORAL at 15:08

## 2025-04-27 RX ADMIN — AMOXICILLIN AND CLAVULANATE POTASSIUM 1 TABLET: 875; 125 TABLET, FILM COATED ORAL at 21:03

## 2025-04-27 RX ADMIN — RISPERIDONE 1 MG: 1 TABLET, ORALLY DISINTEGRATING ORAL at 10:24

## 2025-04-27 RX ADMIN — LAMOTRIGINE 100 MG: 100 TABLET ORAL at 09:29

## 2025-04-27 RX ADMIN — NICOTINE 1 PATCH: 21 PATCH, EXTENDED RELEASE TRANSDERMAL at 09:33

## 2025-04-27 RX ADMIN — Medication 1000 UNITS: at 09:28

## 2025-04-27 RX ADMIN — GUAIFENESIN 600 MG: 600 TABLET ORAL at 21:03

## 2025-04-27 RX ADMIN — LORAZEPAM 0.5 MG: 0.5 TABLET ORAL at 09:28

## 2025-04-27 RX ADMIN — CYANOCOBALAMIN TAB 500 MCG 500 MCG: 500 TAB at 09:28

## 2025-04-27 RX ADMIN — CLOZAPINE 400 MG: 100 TABLET ORAL at 15:08

## 2025-04-27 RX ADMIN — ESCITALOPRAM OXALATE 10 MG: 10 TABLET ORAL at 09:29

## 2025-04-27 RX ADMIN — BISACODYL 10 MG: 5 TABLET, COATED ORAL at 09:29

## 2025-04-27 RX ADMIN — OXYCODONE HYDROCHLORIDE AND ACETAMINOPHEN 1 TABLET: 5; 325 TABLET ORAL at 18:29

## 2025-04-27 RX ADMIN — BENZTROPINE MESYLATE 1 MG: 1 TABLET ORAL at 18:29

## 2025-04-27 RX ADMIN — LAMOTRIGINE 100 MG: 100 TABLET ORAL at 18:29

## 2025-04-27 RX ADMIN — RISPERIDONE 2 MG: 2 TABLET, FILM COATED ORAL at 15:08

## 2025-04-27 RX ADMIN — RISPERIDONE 2 MG: 2 TABLET, FILM COATED ORAL at 09:29

## 2025-04-27 RX ADMIN — OXYCODONE HYDROCHLORIDE AND ACETAMINOPHEN 1 TABLET: 5; 325 TABLET ORAL at 09:28

## 2025-04-27 RX ADMIN — OXYBUTYNIN CHLORIDE 5 MG: 5 TABLET ORAL at 09:28

## 2025-04-27 RX ADMIN — FOLIC ACID 1 MG: 1 TABLET ORAL at 09:28

## 2025-04-27 RX ADMIN — THIAMINE HCL TAB 100 MG 100 MG: 100 TAB at 09:28

## 2025-04-27 RX ADMIN — PANTOPRAZOLE SODIUM 40 MG: 40 TABLET, DELAYED RELEASE ORAL at 09:28

## 2025-04-27 RX ADMIN — ATORVASTATIN CALCIUM 10 MG: 10 TABLET, FILM COATED ORAL at 15:08

## 2025-04-27 RX ADMIN — FLUTICASONE FUROATE AND VILANTEROL TRIFENATATE 1 PUFF: 100; 25 POWDER RESPIRATORY (INHALATION) at 09:33

## 2025-04-27 RX ADMIN — OMEGA-3 FATTY ACIDS CAP 1000 MG 1000 MG: 1000 CAP at 09:28

## 2025-04-27 RX ADMIN — AMOXICILLIN AND CLAVULANATE POTASSIUM 1 TABLET: 875; 125 TABLET, FILM COATED ORAL at 09:29

## 2025-04-27 NOTE — NURSING NOTE
Patient compliant with meds and meals. Patient is visible, social with select peers. Patient brightens on approach. Pleasant and cooperative. Patient states cont having AH but meds are helping. Q 15 min behavioral and safety checks in place.

## 2025-04-27 NOTE — ASSESSMENT & PLAN NOTE
Increased to Clozaril 400 at hs on 4/27, on Clozaril 350 mg had Clozaril Level 4/26/25 resulted at 650.  Increased as patient continues to have paranoid delusions.  Risperdal 2 mg twice daily  Lamictal 100 mg twice daily  Ativan 0.5 mg twice daily  Lexapro 10 mg daily started 4/23/2025 for depression

## 2025-04-27 NOTE — PROGRESS NOTES
"Progress Note - Behavioral Health     Jo-Ann Lamb 54 y.o. female MRN: [id number]   Unit/Bed#: [facility] 218-01 Encounter: [id number]    Assessment & Plan  Schizoaffective disorder, bipolar type (HCC)    Increased to Clozaril 400 at hs on 4/27, on Clozaril 350 mg had Clozaril Level 4/26/25 resulted at 650.  Increased as patient continues to have paranoid delusions.  Risperdal 2 mg twice daily  Lamictal 100 mg twice daily  Ativan 0.5 mg twice daily  Lexapro 10 mg daily started 4/23/2025 for depression    Post-traumatic stress disorder, chronic    LYNN (generalized anxiety disorder)         Risks / Benefits of Treatment:    Risks, benefits, and possible side effects of medications explained to patient and patient verbalizes understanding and agreement for treatment.  Behavior over the last 24 hours: unchanged.     Jo-Ann CUMMINGS seen today, per staff report has been visible, at times suspicious on the unit.  On exam patient continues to report having paranoid delusions.  Appears anxious and worried.  States she talks to her family regarding her thoughts and has to accept \"this is part of my illness.\"  Insight and judgment limited.        Mental Status Evaluation:    Appearance:  casually dressed, adequate grooming, looks stated age, wearing pajamas   Behavior:  cooperative, slow responses   Speech:  slow, soft   Mood:  anxious   Affect:  blunted   Thought Process:  linear, perseverative, negative thinking   Associations: circumstantial associations   Thought Content:  paranoid delusions   Perceptual Disturbances: auditory hallucinations   Risk Potential: Suicidal ideation - None  Homicidal ideation - None   Sensorium:  oriented to person, place, and time/date   Memory:  recent and remote memory grossly intact   Consciousness:  alert and awake   Attention: attention span and concentration appear shorter than expected for age   Insight:  limited   Judgment: limited   Gait/Station: normal gait/station   Motor Activity: no abnormal " movements     Vital signs in last 24 hours:    Temp:  [97.7 °F (36.5 °C)-97.8 °F (36.6 °C)] 97.8 °F (36.6 °C)  HR:  [] 85  BP: (115-151)/(80-84) 151/84  Resp:  [18] 18  SpO2:  [93 %-98 %] 93 %  O2 Device: None (Room air)    Laboratory results: I have personally reviewed all pertinent laboratory/tests results.    All current active medications have been reviewed  Encourage group therapy, milieu therapy and occupational therapy  Behavioral Health checks for safety monitoring  Continue treatment with group therapy, milieu therapy and occupational therapy  Current Facility-Administered Medications   Medication Dose Route Frequency Provider Last Rate    acetaminophen  650 mg Oral Q6H PRN Taylor Castro MD      acetaminophen  650 mg Oral Q4H PRN Taylor Castro MD      acetaminophen  975 mg Oral Q6H PRN Taylor Castro MD      albuterol  2 puff Inhalation Q4H PRN Jose Steel MD      aluminum-magnesium hydroxide-simethicone  30 mL Oral Q4H PRN Taylor Castro MD      amoxicillin-clavulanate  1 tablet Oral Q12H [facility] Zandra Barclay PA-C      atorvastatin  10 mg Oral Daily With Dinner Zandra Barclay PA-C      benztropine  1 mg Intramuscular Q4H PRN Max 6/day Taylor Castro MD      benztropine  1 mg Oral Q4H PRN Max 6/day Taylor Castro MD      bisacodyl  10 mg Oral BID Leny Cardenas MD      bisacodyl  10 mg Rectal Daily PRN Taylor Castro MD      Cholecalciferol  1,000 Units Oral Daily Jose Steel MD      cloZAPine  350 mg Oral Before Dinner Leny Cardenas MD      vitamin B-12  500 mcg Oral Daily Jose Steel MD      hydrOXYzine HCL  50 mg Oral Q6H PRN Max 4/day Taylor Castro MD      Or    diphenhydrAMINE  50 mg Intramuscular Q6H PRN Taylor Castro MD      diphenhydramine, lidocaine, Al/Mg hydroxide, simethicone  10 mL Swish & Swallow Q4H PRN Jose Steel MD      escitalopram  10 mg Oral Daily Leny Cardenas MD      fish oil  1,000 mg Oral Daily Jose Steel MD       Fluticasone Furoate-Vilanterol  1 puff Inhalation Daily Spanish MD Damián      folic acid  1 mg Oral Daily Spanish MD Damián      guaiFENesin  600 mg Oral Q12H [facility] Jose Steel MD      hydrocortisone   Topical 4x Daily PRN Jose Steel MD      hydrOXYzine HCL  100 mg Oral Q6H PRN Max 4/day Taylor Castro MD      Or    LORazepam  2 mg Intramuscular Q6H PRN Taylor Castro MD      hydrOXYzine HCL  25 mg Oral Q6H PRN Max 4/day Taylor Castro MD      lamoTRIgine  100 mg Oral BID Leny Cardenas MD      LORazepam  0.5 mg Oral BID Leny Cardenas MD      nicotine  1 patch Transdermal Daily Spanish MD Damián      nicotine polacrilex  4 mg Oral Q2H PRN Taylor Castro MD      ondansetron  4 mg Oral Q6H PRN Jose Steel MD      oxybutynin  5 mg Oral BID Jose Steel MD      oxyCODONE-acetaminophen  1 tablet Oral Q8H PRN Zandra Barclay PA-C      pantoprazole  40 mg Oral Daily Before Breakfast Spanish MD Damián      phenol  1 spray Mouth/Throat Q2H PRN Zandra Barclay PA-C      polyethylene glycol  17 g Oral Daily PRN Taylor Castro MD      propranolol  10 mg Oral Q8H PRN Taylor Castro MD      risperiDONE  0.25 mg Oral Q6H PRN Max 4/day Bri M Medei, CRNP      risperiDONE  0.5 mg Oral Q6H PRN Max 4/day Bri M Medei, CRNP      risperiDONE  1 mg Oral Q8H PRN Bri M Medei, CRNP      risperiDONE  2 mg Oral BID Leny Cardenas MD      senna-docusate sodium  1 tablet Oral Daily PRN Taylor Castro MD      Thiamine Mononitrate  100 mg Oral Daily Jose Steel MD      traZODone  50 mg Oral HS PRN Taylor Castro MD         Counseling / Coordination of Care:    Medication changes reviewed with nursing staff.  Patient's progress reviewed with nursing staff.  Medication changes discussed with patient.  Supportive therapy provided to patient.    Leny Cardenas MD 04/27/25

## 2025-04-27 NOTE — TREATMENT TEAM
"   04/27/25 1024   Broset Violence Checklist   Assessment type Shift   Irritability 1   Confusion 1   Boisterousness 1   Threatening physical violence 0   Verbal threats 0   Violence 0   Broset score 3     Patient c/o \"my head is fucked up\" states her AH are \"messed up\" and upsetting her. Administered 1mg Risperdal po patient stated she will attempt to use group as a distraction to help with not listening to them. Will monitor for effectiveness   "

## 2025-04-27 NOTE — PROGRESS NOTES
"Progress Note - Jo-Ann Lamb 54 y.o. female MRN: [id number]    Unit/Bed#: [facility] 218-01 Encounter: [id number]        Subjective:   Patient seen and examined at bedside after reviewing the chart and discussing the case with the caring staff.      Patient examined at bedside.  Patient is complaining of back pain and is requesting something for it.  Patient also reports that she has loose bowel movement as she is getting Dulcolax 2 tablets twice daily.    Physical Exam   Vitals: Blood pressure 151/84, pulse 85, temperature 97.8 °F (36.6 °C), temperature source Temporal, resp. rate 18, height 5' 1\" (1.549 m), weight 65.5 kg (144 lb 6.4 oz), last menstrual period 01/01/2020, SpO2 93%, not currently breastfeeding.,Body mass index is 27.28 kg/m².  Constitutional: Patient in no acute distress.  HEENT: PERR, EOMI, MMM.  Cardiovascular: Normal rate and regular rhythm.    Pulmonary/Chest: Effort normal and breath sounds normal.   Abdomen: Soft, + BS, NT.    Assessment/Plan:  Jo-Ann Lamb is a(n) 54 y.o. year old female with Schizoaffective DO     HTN.  No longer taking Losartan 50 mg daily.  Will continue to monitor.   HLD.  Continue fish oil daily.  Restart atorvastatin 10 mg daily 4/24.     Asthma.  Patient is on Breo Ellipta daily (Advair nonform), albuterol inhaler as needed.   Allergic rhinitis.  Stable.  Declines previous Flonase -believes it is causing sore throat.   GERD.  Patient on Protonix 40 mg daily.   Urge incontinence.  Continue oxybutynin 5 mg twice daily.   Constipation/hemorrhoids.  Reduce Dulcolax 10 mg daily 4/27/2025.  Miralax or Senokot as needed.  Apply Anusol-HC as needed.    Chronic back w/ radiculopathy.  Patient follows with pain management.  Patient on Percocet 5-325 mg q8h as needed for breakthrough pain.  PDMP reviewed.  Patient also receiving epidural injections to lower back outpt.  Tylenol as needed for mild-moderate pain.  Patient may get Lidoderm patch over the back.  Tobacco abuse.  NRT. "   Vitamin D deficiency.  Continue vitamin D3 1000 units daily.  Recheck levels.   Vitamin B12 deficiency.  Continue B12 500 mcg daily.  Recheck levels.   Sore throat/mouth pain.  Cough drops or Magic mouthwash as needed per Dr. Steel.  Chloraseptic spray as needed 4/25.   Chest congestion/cough.  Patient treated with azithromycin for mycoplasma pneumonia 4/10.  Start Augmentin 875-125 mg twice daily x 7 days.  Patient with frequent infections -needs to follow up outpatient.

## 2025-04-27 NOTE — PLAN OF CARE
Problem: Alteration in Thoughts and Perception  Goal: Refrain from acting on delusional thinking/internal stimuli  Description: Interventions:- Monitor patient closely, per order - Utilize least restrictive measures - Set reasonable limits, give positive feedback for acceptable - Administer medications as ordered and monitor of potential side effects  Outcome: Progressing  Goal: Agree to be compliant with medication regime, as prescribed and report medication side effects  Description: Interventions:- Offer appropriate PRN medication and supervise ingestion; conduct AIMS, as needed   Outcome: Progressing  Goal: Complete daily ADLs, including personal hygiene independently, as able  Description: Interventions:- Observe, teach, and assist patient with ADLS- Monitor and promote a balance of rest/activity, with adequate nutrition and elimination   Interventions:- Observe, teach, and assist patient with ADLS- Monitor and promote a balance of rest/activity, with adequate nutrition and elimination  Interventions:- Observe, teach, and assist patient with ADLS-  Monitor and promote a balance of rest/activity, with adequate nutrition and elimination   Interventions:- Observe, teach, and assist patient with ADLS  Outcome: Progressing     Problem: Risk for Self Injury/Neglect  Goal: Complete daily ADLs, including personal hygiene independently, as able  Description: Interventions:- Observe, teach, and assist patient with ADLS- Monitor and promote a balance of rest/activity, with adequate nutrition and elimination   Interventions:- Observe, teach, and assist patient with ADLS- Monitor and promote a balance of rest/activity, with adequate nutrition and elimination  Interventions:- Observe, teach, and assist patient with ADLS-  Monitor and promote a balance of rest/activity, with adequate nutrition and elimination   Interventions:- Observe, teach, and assist patient with ADLS  Outcome: Progressing     Problem: Depression  Goal:  Complete daily ADLs, including personal hygiene independently, as able  Description: Interventions:- Observe, teach, and assist patient with ADLS- Monitor and promote a balance of rest/activity, with adequate nutrition and elimination   Interventions:- Observe, teach, and assist patient with ADLS- Monitor and promote a balance of rest/activity, with adequate nutrition and elimination  Interventions:- Observe, teach, and assist patient with ADLS-  Monitor and promote a balance of rest/activity, with adequate nutrition and elimination   Interventions:- Observe, teach, and assist patient with ADLS  Outcome: Progressing     Problem: Alteration in Orientation  Goal: Complete daily ADLs, including personal hygiene independently, as able  Description: Interventions:- Observe, teach, and assist patient with ADLS- Monitor and promote a balance of rest/activity, with adequate nutrition and elimination   Interventions:- Observe, teach, and assist patient with ADLS- Monitor and promote a balance of rest/activity, with adequate nutrition and elimination  Interventions:- Observe, teach, and assist patient with ADLS-  Monitor and promote a balance of rest/activity, with adequate nutrition and elimination   Interventions:- Observe, teach, and assist patient with ADLS  Outcome: Progressing   See chart note

## 2025-04-27 NOTE — NURSING NOTE
"Patient visible and social with peers.  Patient appears very anxious but denies any anxiety or depression.  Endorse \"voices\"  saying that \"Jo-Ann is not to be hypnotized\". Patient stated she was relaxing in bed \"working out problems in her head\". Believes that the \"voices\" are the voice of reason. Plus, this writer had to reassure the patient that her belief of her meals are being dosed with medication that she does not want / poisoning her.  Patient attended snack. Pleasant and redirectable.  HS medication compliant. Requested Atarax 100 mg PO for sleep instead of trazodone. Plus, was given cogentin 1 mg PO for side effects and percocet for pain with her HS medication. Q 15 minute monitoring maintained.  "

## 2025-04-28 PROCEDURE — 99232 SBSQ HOSP IP/OBS MODERATE 35: CPT | Performed by: PSYCHIATRY & NEUROLOGY

## 2025-04-28 RX ORDER — CLOZAPINE 100 MG/1
400 TABLET ORAL
Status: DISCONTINUED | OUTPATIENT
Start: 2025-04-28 | End: 2025-04-30

## 2025-04-28 RX ORDER — CLOZAPINE 100 MG/1
400 TABLET ORAL
Status: DISCONTINUED | OUTPATIENT
Start: 2025-04-28 | End: 2025-04-28

## 2025-04-28 RX ADMIN — HYDROXYZINE HYDROCHLORIDE 100 MG: 50 TABLET, FILM COATED ORAL at 18:19

## 2025-04-28 RX ADMIN — RISPERIDONE 1 MG: 1 TABLET, ORALLY DISINTEGRATING ORAL at 10:31

## 2025-04-28 RX ADMIN — OXYBUTYNIN CHLORIDE 5 MG: 5 TABLET ORAL at 17:26

## 2025-04-28 RX ADMIN — THIAMINE HCL TAB 100 MG 100 MG: 100 TAB at 09:38

## 2025-04-28 RX ADMIN — NICOTINE 1 PATCH: 21 PATCH, EXTENDED RELEASE TRANSDERMAL at 09:37

## 2025-04-28 RX ADMIN — SENNOSIDES AND DOCUSATE SODIUM 1 TABLET: 50; 8.6 TABLET ORAL at 16:16

## 2025-04-28 RX ADMIN — RISPERIDONE 2 MG: 2 TABLET, FILM COATED ORAL at 15:09

## 2025-04-28 RX ADMIN — RISPERIDONE 2 MG: 2 TABLET, FILM COATED ORAL at 09:37

## 2025-04-28 RX ADMIN — PANTOPRAZOLE SODIUM 40 MG: 40 TABLET, DELAYED RELEASE ORAL at 09:38

## 2025-04-28 RX ADMIN — OMEGA-3 FATTY ACIDS CAP 1000 MG 1000 MG: 1000 CAP at 09:37

## 2025-04-28 RX ADMIN — CYANOCOBALAMIN TAB 500 MCG 500 MCG: 500 TAB at 09:37

## 2025-04-28 RX ADMIN — Medication 1000 UNITS: at 09:38

## 2025-04-28 RX ADMIN — LAMOTRIGINE 100 MG: 100 TABLET ORAL at 17:26

## 2025-04-28 RX ADMIN — LIDOCAINE 1 PATCH: 50 PATCH CUTANEOUS at 09:39

## 2025-04-28 RX ADMIN — OXYCODONE HYDROCHLORIDE AND ACETAMINOPHEN 1 TABLET: 5; 325 TABLET ORAL at 09:39

## 2025-04-28 RX ADMIN — OXYCODONE HYDROCHLORIDE AND ACETAMINOPHEN 1 TABLET: 5; 325 TABLET ORAL at 18:19

## 2025-04-28 RX ADMIN — GUAIFENESIN 600 MG: 600 TABLET ORAL at 09:37

## 2025-04-28 RX ADMIN — LORAZEPAM 0.5 MG: 0.5 TABLET ORAL at 09:38

## 2025-04-28 RX ADMIN — BENZTROPINE MESYLATE 1 MG: 1 TABLET ORAL at 18:19

## 2025-04-28 RX ADMIN — CLOZAPINE 400 MG: 100 TABLET ORAL at 17:26

## 2025-04-28 RX ADMIN — OXYBUTYNIN CHLORIDE 5 MG: 5 TABLET ORAL at 09:38

## 2025-04-28 RX ADMIN — LORAZEPAM 0.5 MG: 0.5 TABLET ORAL at 15:09

## 2025-04-28 RX ADMIN — BISACODYL 10 MG: 5 TABLET, COATED ORAL at 09:39

## 2025-04-28 RX ADMIN — ESCITALOPRAM OXALATE 10 MG: 10 TABLET ORAL at 09:37

## 2025-04-28 RX ADMIN — AMOXICILLIN AND CLAVULANATE POTASSIUM 1 TABLET: 875; 125 TABLET, FILM COATED ORAL at 09:38

## 2025-04-28 RX ADMIN — LAMOTRIGINE 100 MG: 100 TABLET ORAL at 09:38

## 2025-04-28 RX ADMIN — GUAIFENESIN 600 MG: 600 TABLET ORAL at 21:47

## 2025-04-28 RX ADMIN — ATORVASTATIN CALCIUM 10 MG: 10 TABLET, FILM COATED ORAL at 17:26

## 2025-04-28 RX ADMIN — AMOXICILLIN AND CLAVULANATE POTASSIUM 1 TABLET: 875; 125 TABLET, FILM COATED ORAL at 21:47

## 2025-04-28 RX ADMIN — FOLIC ACID 1 MG: 1 TABLET ORAL at 09:37

## 2025-04-28 NOTE — NURSING NOTE
"Patient withdrawn to room this evening. Pleasant and cooperative. Denies any SI/HI, AV/H, anxiety or depression. Patient stated that she feels medications are helping and even the \"voices\" are controlled.  Q 15 minute monitoring maintained.  "

## 2025-04-28 NOTE — NURSING NOTE
Patient stated that 1mg Risperidal helped her agitation. She is now able to be present in the milieu without agitation.

## 2025-04-28 NOTE — PROGRESS NOTES
"Progress Note - Jo-Ann Lamb 54 y.o. female MRN: [id number]    Unit/Bed#: [facility] 218-01 Encounter: [id number]        Subjective:   Patient seen and examined at bedside after reviewing the chart and discussing the case with the caring staff.      Patient examined at bedside.  Patient denies any acute symptoms.     Physical Exam   Vitals: Blood pressure 128/84, pulse 83, temperature 97.5 °F (36.4 °C), temperature source Temporal, resp. rate 18, height 5' 1\" (1.549 m), weight 65.5 kg (144 lb 6.4 oz), last menstrual period 01/01/2020, SpO2 93%, not currently breastfeeding.,Body mass index is 27.28 kg/m².  Constitutional: Patient in no acute distress.  HEENT: PERR, EOMI, MMM.  Cardiovascular: Normal rate and regular rhythm.    Pulmonary/Chest: Effort normal and breath sounds normal.   Abdomen: Soft, + BS, NT.    Assessment/Plan:  Jo-Ann Lamb is a(n) 54 y.o. year old female with Schizoaffective DO     HTN.  No longer taking Losartan 50 mg daily.  Will continue to monitor.   HLD.  Continue fish oil daily.  Restart atorvastatin 10 mg daily 4/24.     Asthma.  Patient is on Breo Ellipta daily (Advair nonform), albuterol inhaler as needed.   Allergic rhinitis.  Stable.  Declines previous Flonase -believes it is causing sore throat.   GERD.  Patient on Protonix 40 mg daily.   Urge incontinence.  Continue oxybutynin 5 mg twice daily.   Constipation/hemorrhoids.  Reduce Dulcolax 10 mg daily 4/27/2025.  Miralax or Senokot as needed.  Apply Anusol-HC as needed.    Chronic back w/ radiculopathy.  Patient follows with pain management.  Patient on Percocet 5-325 mg q8h as needed for breakthrough pain.  PDMP reviewed.  Patient also receiving epidural injections to lower back outpt.  Tylenol as needed for mild-moderate pain.  Patient may get Lidoderm patch over the back.  Tobacco abuse.  NRT.   Vitamin D deficiency.  Continue vitamin D3 1000 units daily.  Recheck levels.   Vitamin B12 deficiency.  Continue B12 500 mcg daily.  Recheck " levels.   Sore throat/mouth pain.  Cough drops or Magic mouthwash as needed per Dr. Steel.  Chloraseptic spray as needed 4/25.   Chest congestion/cough.  Patient treated with azithromycin for mycoplasma pneumonia 4/10.  Start Augmentin 875-125 mg twice daily x 7 days.  Patient with frequent infections -needs to follow up outpatient.     The patient was discussed with Dr. Steel and he is in agreement with the above note.

## 2025-04-28 NOTE — PROGRESS NOTES
Progress Note - Behavioral Health   Name: Jo-Ann Lamb 54 y.o. female I MRN: [id number]  Unit/Bed#: -01 I Date of Admission: 4/21/2025   Date of Service: 4/28/2025 I Hospital Day: 7        Assessment & Plan  Schizoaffective disorder, bipolar type (HCC)  Continue the following:   Clozaril 400 mg PO QHS for ongoing paranoid delusions; increased 4/27/25, on Clozaril 350 mg had Clozaril Level 4/26/25 resulted at 655  Risperdal 2 mg twice daily; consider transition to BLANCA to ensure compliance  Lamictal 100 mg twice daily  Ativan 0.5 mg twice daily  Lexapro 10 mg daily started 4/23/2025 for depression  Post-traumatic stress disorder, chronic    LYNN (generalized anxiety disorder)      Risks / Benefits of Treatment:  Risks, benefits, and possible side effects of medications explained to patient and patient verbalizes understanding and agreement for treatment.      Progress Toward Goals: Slowly improving.  Less paranoid.  Continues to verbalize bizarre delusions that are chronic and baseline.  Responds well to redirection.  Reports waking up agitated and irritable; received PRN risperdal.  No longer endorsing SI and reports improving anxiety and depression.  Consider transition to BLANCA for Risperdal therapy.  Should patient continue to endorse agitation and irritability and morning, consider discontinuing Lexapro.  Potential discharge by end of week.      Subjective:    Jo-Ann is visible and engaged in milieu activities.  Less paranoid.  Continues to verbalize chronic bizarre delusions but responds well to redirection.  Maintaining safety with no return of SI.  Reports improved anxiety and depression.  States she feels agitated and angry upon awakening requiring PRN medication. Consider discontinuing Lexapro to reduce mood instability.  Otherwise maintaining ADLs and adequate sleep.      Behavior over the last 24 hours: slowly improving.   Sleep: normal  Appetite: normal  Medication side effects: No   ROS: no complaints,  "all other systems are negative    Objective :  Temp:  [97.1 °F (36.2 °C)-97.5 °F (36.4 °C)] 97.5 °F (36.4 °C)  HR:  [83-94] 83  BP: (128-142)/(84-86) 128/84  Resp:  [18] 18  SpO2:  [93 %-97 %] 93 %  O2 Device: None (Room air)    Mental Status Evaluation:  Appearance:  age appropriate, casually dressed, blond hair   Behavior:  Cooperative   Speech:  Normal pitch/volume   Mood:  \"Better\"   Affect:  constricted, mood-congruent, redirectable   Thought Process:  Circumstantial   Associations: Circumstantial   Thought Content:  Less paranoid, bizarre delusions that are chronic and baseline   Perceptual Disturbances: Decreased AH, noncommanding in nature   Risk Potential: Suicidal ideation -none at present  Homicidal ideation -none  Potential for aggression -no   Sensorium:  oriented to person, place, time/date, and situation   Memory:  recent and remote memory grossly intact   Consciousness:  alert and awake   Attention/Concentration: attention span and concentration are age appropriate   Insight:  limited   Judgment: limited   Gait/Station: normal gait/station, normal balance   Motor Activity: no abnormal movements       Lab Results: I have reviewed the following results:  CBC:   Lab Results   Component Value Date    WBC 11.30 (H) 04/22/2025    RBC 4.44 04/22/2025    HGB 13.5 04/22/2025    HCT 42.0 04/22/2025    MCV 95 04/22/2025     04/22/2025    MCH 30.4 04/22/2025    MCHC 32.1 04/22/2025    RDW 14.7 04/22/2025    MPV 9.5 04/22/2025    NEUTROABS 7.21 04/22/2025     Clozapine:   Lab Results   Component Value Date    CLOZAPINE 655 04/26/2025         Counseling / Coordination of Care:    Patient's progress discussed with staff in treatment team meeting.  Medication changes reviewed with staff in treatment team meeting.  Patient's progress reviewed with nursing staff.  Medications, treatment progress and treatment plan reviewed with patient.  Medication education provided to patient.  Patient's progress reviewed " with case management staff.  Patient's diagnosis and treatment indicated reviewed with patient.  Importance of medication and treatment compliance reviewed with patient.  Educated on importance of medication and treatment compliance.  Discussed with patient acceptance of mental illness diagnosis and need for ongoing treatment after discharge.  Cognitive techniques utilized during the session.  Reassurance and supportive therapy provided.  Reoriented to reality and reassured.  Group attendance encouraged.  Encouraged participation in milieu and group therapy on the unit.  Crisis/safety plan discussed with patient.      JARED Bolton 04/28/25

## 2025-04-28 NOTE — ASSESSMENT & PLAN NOTE
Continue the following:   Clozaril 400 mg PO QHS for ongoing paranoid delusions; increased 4/27/25, on Clozaril 350 mg had Clozaril Level 4/26/25 resulted at 655  Risperdal 2 mg twice daily; consider transition to BLANCA to ensure compliance  Lamictal 100 mg twice daily  Ativan 0.5 mg twice daily  Lexapro 10 mg daily started 4/23/2025 for depression

## 2025-04-28 NOTE — PROGRESS NOTES
04/28/25 1032   Broset Violence Checklist   Assessment type Reassessment   Irritability 1   Confusion 0   Boisterousness 0   Threatening physical violence 0   Verbal threats 0   Violence 0   Broset score 1     Patient stated she woke up irritated and agitated. This writer suggested she allow her morning medicaitons time to work. She approached this writer again stating that she was so agitated she was not able to sit in group and it was upsetting to her. 1mg Risperdal given for severe agitation. Will monitor for effectiveness.

## 2025-04-28 NOTE — PLAN OF CARE
Problem: Alteration in Thoughts and Perception  Goal: Agree to be compliant with medication regime, as prescribed and report medication side effects  Description: Interventions:- Offer appropriate PRN medication and supervise ingestion; conduct AIMS, as needed   Outcome: Progressing     Problem: Risk for Self Injury/Neglect  Goal: Treatment Goal: Remain safe during length of stay, learn and adopt new coping skills, and be free of self-injurious ideation, impulses and acts at the time of discharge  Outcome: Progressing  Goal: Refrain from harming self  Description: Interventions:- Monitor patient closely, per order- Develop a trusting relationship- Supervise medication ingestion, monitor effects and side effects   Interventions:- Monitor patient closely, per order- Develop a trusting relationship- Supervise medication ingestion, monitor effects and side effects   Outcome: Progressing     Problem: Risk for Violence/Aggression Toward Others  Goal: Treatment Goal: Refrain from acts of violence/aggression during length of stay, and demonstrate improved impulse control at the time of discharge  Outcome: Progressing  Goal: Control angry outbursts  Description: Interventions:- Monitor patient closely, per order- Ensure early verbal de-escalation- Monitor prn medication needs- Set reasonable/therapeutic limits, outline behavioral expectations, and consequences - Provide a non-threatening milieu, utilizing the least restrictive interventions   Outcome: Progressing   See chart note

## 2025-04-28 NOTE — PLAN OF CARE
Problem: Ineffective Coping  Goal: Participates in unit activities  Description: Interventions:- Provide therapeutic environment - Provide required programming - Redirect inappropriate behaviors   Outcome: Progressing     Problem: Risk for Self Injury/Neglect  Goal: Attend and participate in unit activities, including therapeutic, recreational, and educational groups  Description: Interventions:- Provide therapeutic and educational activities daily, encourage attendance and participation, and document same in the medical record- Obtain collateral information, encourage visitation and family involvement in care   Interventions:- Provide therapeutic and educational activities daily, encourage attendance and participation, and document same in the medical record - Provide appropriate opportunities for reminiscence - Provide a consistent daily routine - Encourage family contact/visitation   Outcome: Progressing     Problem: Risk for Violence/Aggression Toward Others  Goal: Attend and participate in unit activities, including therapeutic, recreational, and educational groups  Description: Interventions:- Provide therapeutic and educational activities daily, encourage attendance and participation, and document same in the medical record   Outcome: Progressing     Problem: Alteration in Orientation  Goal: Attend and participate in unit activities, including therapeutic, recreational, and educational groups  Description: Interventions:- Provide therapeutic and educational activities daily, encourage attendance and participation, and document same in the medical record- Obtain collateral information, encourage visitation and family involvement in care   Interventions:- Provide therapeutic and educational activities daily, encourage attendance and participation, and document same in the medical record - Provide appropriate opportunities for reminiscence - Provide a consistent daily routine - Encourage family  contact/visitation   Outcome: Progressing

## 2025-04-28 NOTE — PROGRESS NOTES
"   04/28/25 1835   Martines Anxiety Scale   Anxious Mood 4   Tension 4   Fears 4   Insomnia 2   Intellectual 2   Depressed Mood 3   Somatic Complaints: Muscular 0   Somatic Complaints: Sensory 0   Cardiovascular Symptoms 0   Respiratory Symptoms 0   Gastrointestinal Symptoms 0   Genitourinary Symptoms 0   Autonomic Symptoms 3   Behavior at Interview 4   Martines Anxiety Score 26     Patient stated she \"was having a hard day\". She asked this writer if she had been hypnotized. This writer assured her that she had not been hypnotized. Patient endorsed severe anxiety regarding her difficult day. She expressed that she was going to lay down and rest.   "

## 2025-04-28 NOTE — PROGRESS NOTES
04/28/25    Team Meeting   Meeting Type Daily Rounds   Initial Conference Date 04/28/25   Team Members Present   Team Members Present Physician;Nurse;;Occupational Therapist   Physician Team Member Florencio Manzano MD   Nursing Team Member Agustin JIMENES   Social Work Team Member Florian BURROUGHS   OT Team Member Pope BETTYE   Patient/Family Present   Patient Present No   Patient's Family Present No   201, [place] gh with [facility], Clozaril, denies all, slept, d/c Thurs

## 2025-04-29 PROCEDURE — 99232 SBSQ HOSP IP/OBS MODERATE 35: CPT | Performed by: PSYCHIATRY & NEUROLOGY

## 2025-04-29 RX ORDER — BISACODYL 5 MG/1
10 TABLET, DELAYED RELEASE ORAL 2 TIMES DAILY
Status: DISCONTINUED | OUTPATIENT
Start: 2025-04-29 | End: 2025-05-05 | Stop reason: HOSPADM

## 2025-04-29 RX ADMIN — FOLIC ACID 1 MG: 1 TABLET ORAL at 08:28

## 2025-04-29 RX ADMIN — THIAMINE HCL TAB 100 MG 100 MG: 100 TAB at 08:27

## 2025-04-29 RX ADMIN — PANTOPRAZOLE SODIUM 40 MG: 40 TABLET, DELAYED RELEASE ORAL at 08:29

## 2025-04-29 RX ADMIN — RISPERIDONE 2 MG: 2 TABLET, FILM COATED ORAL at 15:09

## 2025-04-29 RX ADMIN — LAMOTRIGINE 100 MG: 100 TABLET ORAL at 17:49

## 2025-04-29 RX ADMIN — LIDOCAINE 1 PATCH: 50 PATCH CUTANEOUS at 08:29

## 2025-04-29 RX ADMIN — CYANOCOBALAMIN TAB 500 MCG 500 MCG: 500 TAB at 08:27

## 2025-04-29 RX ADMIN — BISACODYL 10 MG: 5 TABLET, COATED ORAL at 21:25

## 2025-04-29 RX ADMIN — HYDROCORTISONE: 25 CREAM TOPICAL at 11:54

## 2025-04-29 RX ADMIN — AMOXICILLIN AND CLAVULANATE POTASSIUM 1 TABLET: 875; 125 TABLET, FILM COATED ORAL at 08:27

## 2025-04-29 RX ADMIN — LORAZEPAM 0.5 MG: 0.5 TABLET ORAL at 15:09

## 2025-04-29 RX ADMIN — OMEGA-3 FATTY ACIDS CAP 1000 MG 1000 MG: 1000 CAP at 08:27

## 2025-04-29 RX ADMIN — ACETAMINOPHEN 650 MG: 325 TABLET ORAL at 09:00

## 2025-04-29 RX ADMIN — NICOTINE 1 PATCH: 21 PATCH, EXTENDED RELEASE TRANSDERMAL at 08:28

## 2025-04-29 RX ADMIN — RISPERIDONE 2 MG: 2 TABLET, FILM COATED ORAL at 08:27

## 2025-04-29 RX ADMIN — RISPERIDONE 0.5 MG: 0.5 TABLET, ORALLY DISINTEGRATING ORAL at 10:33

## 2025-04-29 RX ADMIN — OXYBUTYNIN CHLORIDE 5 MG: 5 TABLET ORAL at 08:27

## 2025-04-29 RX ADMIN — ATORVASTATIN CALCIUM 10 MG: 10 TABLET, FILM COATED ORAL at 17:48

## 2025-04-29 RX ADMIN — CLOZAPINE 400 MG: 100 TABLET ORAL at 17:48

## 2025-04-29 RX ADMIN — AMOXICILLIN AND CLAVULANATE POTASSIUM 1 TABLET: 875; 125 TABLET, FILM COATED ORAL at 21:24

## 2025-04-29 RX ADMIN — GUAIFENESIN 600 MG: 600 TABLET ORAL at 08:28

## 2025-04-29 RX ADMIN — LORAZEPAM 0.5 MG: 0.5 TABLET ORAL at 08:27

## 2025-04-29 RX ADMIN — BISACODYL 10 MG: 5 TABLET, COATED ORAL at 08:28

## 2025-04-29 RX ADMIN — ACETAMINOPHEN 975 MG: 325 TABLET ORAL at 17:52

## 2025-04-29 RX ADMIN — OXYBUTYNIN CHLORIDE 5 MG: 5 TABLET ORAL at 17:48

## 2025-04-29 RX ADMIN — OXYCODONE HYDROCHLORIDE AND ACETAMINOPHEN 1 TABLET: 5; 325 TABLET ORAL at 10:33

## 2025-04-29 RX ADMIN — Medication 1000 UNITS: at 08:27

## 2025-04-29 RX ADMIN — LAMOTRIGINE 100 MG: 100 TABLET ORAL at 08:27

## 2025-04-29 RX ADMIN — ESCITALOPRAM OXALATE 10 MG: 10 TABLET ORAL at 08:28

## 2025-04-29 RX ADMIN — GUAIFENESIN 600 MG: 600 TABLET ORAL at 21:24

## 2025-04-29 RX ADMIN — OXYCODONE HYDROCHLORIDE AND ACETAMINOPHEN 1 TABLET: 5; 325 TABLET ORAL at 18:56

## 2025-04-29 NOTE — NURSING NOTE
"Pt has been irritable w/ low frustration tolerance. Previously requested Percocet for 6/10 back pain, but was requited to utilize tylenol as first analgesic. Pt describes tylenol 650 mg as ineffective. Requests Percocet for continued 6/10 back pain. 5 mg administered Pt also requests PRN Risperdal for agitation and delusional thinking. Pt states, \"I am having trouble figuring out if things from my past are real or just delusions.\" 0.5 mg Risperdal PRN administered for a broset of 2 and psychosis. Will monitor for effectiveness.  "

## 2025-04-29 NOTE — NURSING NOTE
Pt describes PRN Tylenol as ineffective. Requests PRN Percocet. 5 mg administered for breakthrough pain. Will monitor for effectiveness.

## 2025-04-29 NOTE — PROGRESS NOTES
"Progress Note - Jo-Ann Lamb 54 y.o. female MRN: [id number]    Unit/Bed#: [facility] 218-01 Encounter: [id number]        Subjective:   Patient seen and examined at bedside after reviewing the chart and discussing the case with the caring staff.      Patient examined at bedside.  Patient reports she feels constipated and wants her Dulcolax to be given twice.    Physical Exam   Vitals: Blood pressure 145/85, pulse 84, temperature 98.4 °F (36.9 °C), temperature source Temporal, resp. rate 18, height 5' 1\" (1.549 m), weight 65.5 kg (144 lb 6.4 oz), last menstrual period 01/01/2020, SpO2 94%, not currently breastfeeding.,Body mass index is 27.28 kg/m².  Constitutional: Patient in no acute distress.  HEENT: PERR, EOMI, MMM.  Cardiovascular: Normal rate and regular rhythm.    Pulmonary/Chest: Effort normal and breath sounds normal.   Abdomen: Soft, + BS, NT.    Assessment/Plan:  Jo-Ann Lamb is a(n) 54 y.o. year old female with Schizoaffective DO     HTN.  No longer taking Losartan 50 mg daily.  Will continue to monitor.   HLD.  Continue fish oil daily.  Restart atorvastatin 10 mg daily 4/24.     Asthma.  Patient is on Breo Ellipta daily (Advair nonform), albuterol inhaler as needed.   Allergic rhinitis.  Stable.  Declines previous Flonase -believes it is causing sore throat.   GERD.  Patient on Protonix 40 mg daily.   Urge incontinence.  Continue oxybutynin 5 mg twice daily.   Constipation/hemorrhoids.  Increase Dulcolax 10 mg twice daily 4/29/2025.  Miralax or Senokot as needed.  Apply Anusol-HC as needed.    Chronic back w/ radiculopathy.  Patient follows with pain management.  Patient on Percocet 5-325 mg q8h as needed for breakthrough pain.  PDMP reviewed.  Patient also receiving epidural injections to lower back outpt.  Tylenol as needed for mild-moderate pain.  Patient may get Lidoderm patch over the back.  Tobacco abuse.  NRT.   Vitamin D deficiency.  Continue vitamin D3 1000 units daily.  Recheck levels.   Vitamin B12 " deficiency.  Continue B12 500 mcg daily.  Recheck levels.   Sore throat/mouth pain.  Cough drops or Magic mouthwash as needed per Dr. Steel.  Chloraseptic spray as needed 4/25.   Chest congestion/cough.  Patient treated with azithromycin for mycoplasma pneumonia 4/10.  Start Augmentin 875-125 mg twice daily x 7 days.  Patient with frequent infections -needs to follow up outpatient.

## 2025-04-29 NOTE — NURSING NOTE
"Patient observed within her room for almost the entirety of the evening until woken up for medication administration and assessment. Brightens somewhat on approach. She reports a difficult day and feeling exhausted. She denies hallucinations during the time of assessment and states her current mood was \"okay\". She was pleasant and polite. She denies SI/HI. Verbalizes understanding to come to staff and verbalize concerns for her safety. Takes medication as prescribed. Q15 minute checks ongoing.   "

## 2025-04-29 NOTE — ASSESSMENT & PLAN NOTE
Continue the following:   Clozaril 400 mg PO QHS for ongoing paranoid delusions; increased 4/27/25, on Clozaril 350 mg had Clozaril Level 4/26/25 resulted at 655  Risperdal 2 mg twice daily; Plan is to transition to Uzedy 100 mg SQ monthly injection to assist with medication compliance in the outpatient setting. Awaiting insurance approval.  Lamictal 100 mg twice daily  Ativan 0.5 mg twice daily  Lexapro 10 mg daily started 4/23/2025 for depression

## 2025-04-29 NOTE — NURSING NOTE
Pt labile in mood throughout shift. Irritable edge at time. Remains behaviorally controlled. Visible, but limited interaction w/ peers. + meals and meds. Fair appetite. Compliant w/ mouth checks. On assessment denies SI/HI. Endorses fluctuating anxiety. Pt is demanding. Frequently at the nurse's station w/ requests. Encouraged to bundle needs and utilize coping skills. Q15 safety checks maintained.

## 2025-04-29 NOTE — SOCIAL WORK
Gigi spoke to Dandre MCNAMARA Act nurse [phone]. Provided pt update. Dandre in agreement. He plans to see pt on the unit tato around 1pm.

## 2025-04-29 NOTE — PLAN OF CARE
Problem: Alteration in Thoughts and Perception  Goal: Verbalize thoughts and feelings  Description: Interventions:- Promote a nonjudgmental and trusting relationship with the patient through active listening and therapeutic communication- Assess patient's level of functioning, behavior and potential for risk- Engage patient in 1 on 1 interactions- Encourage patient to express fears, feelings, frustrations, and discuss symptoms  - [place] patient to reality, help patient recognize reality-based thinking - Administer medications as ordered and assess for potential side effects- Provide the patient education related to the signs and symptoms of the illness and desired effects of prescribed medications  Interventions:- Assess and re-assess patient's lethality and potential for self-injury- Engage patient in 1:1 interactions, daily, for a minimum of 15 minutes- Encourage patient to express feelings, fears, frustrations, hopes- Establish rapport/trust with patient   Interventions:- Assess and re-assess patient's level of risk - Engage patient in 1:1 interactions, daily, for a minimum of 15 minutes - Encourage patient to express feelings, fears, frustrations, hopes   Interventions:- Assess and re-assess patient's level of risk, every waking shift- Engage patient in 1:1 interactions, daily, for a minimum of 15 minutes - Allow patient to express feelings and frustrations in a safe and non-threatening manner - Establish rapport/trust with patient   Outcome: Progressing  Goal: Agree to be compliant with medication regime, as prescribed and report medication side effects  Description: Interventions:- Offer appropriate PRN medication and supervise ingestion; conduct AIMS, as needed   Outcome: Progressing

## 2025-04-29 NOTE — PROGRESS NOTES
04/29/25    Team Meeting   Meeting Type Daily Rounds   Initial Conference Date 04/29/25   Team Members Present   Team Members Present Physician;Nurse;Occupational Therapist;   Physician Team Member Florencio Manzano MD   Nursing Team Member Agustin JIMENES   Social Work Team Member Florian BURROUGHS   OT Team Member Pope BETTYE   Patient/Family Present   Patient Present No   Patient's Family Present No   201, irritable edge in the morning, Clozaril level, requesting BLANCA Risperdal, d/c Fri if stable on med adjust

## 2025-04-29 NOTE — NURSING NOTE
"On reassessment PRN percocet and Risperdal effective. Pt appears more relaxed' is currently pacing calmly in the halls. Rates back pain as a 2/10. States, \"I feel a lot better now, but I'm just not thinking about the things that were bothering me.\" Will cont to monitor.   "

## 2025-04-29 NOTE — PROGRESS NOTES
"Progress Note - Behavioral Health   Name: Jo-Ann Lamb 54 y.o. female I MRN: [id number]  Unit/Bed#: -01 I Date of Admission: 4/21/2025   Date of Service: 4/29/2025 I Hospital Day: 8        Assessment & Plan  Schizoaffective disorder, bipolar type (HCC)  Continue the following:   Clozaril 400 mg PO QHS for ongoing paranoid delusions; increased 4/27/25, on Clozaril 350 mg had Clozaril Level 4/26/25 resulted at 655  Risperdal 2 mg twice daily; Plan is to transition to Uzedy 100 mg SQ monthly injection to assist with medication compliance in the outpatient setting. Awaiting insurance approval.  Lamictal 100 mg twice daily  Ativan 0.5 mg twice daily  Lexapro 10 mg daily started 4/23/2025 for depression  Post-traumatic stress disorder, chronic    LYNN (generalized anxiety disorder)      Risks / Benefits of Treatment:  Risks, benefits, and possible side effects of medications explained to patient and patient verbalizes understanding and agreement for treatment.      Progress Toward Goals: Improving.  Maintaining safety with no return of SI.  Reports decrease in paranoia and auditory hallucinations are no longer commanding.  Patient interested in BLANCA to assist with medication compliance in the outpatient setting.  Awaiting insurance approval to initiate Uzedy 100 mg SQ monthly injections. Once initiated, will discontinue PO risperdal.  Plan is to administer over the next 24 hours.  Otherwise, patient appears to be approaching her psychiatric baseline.  Potential discharge by end of week.      Subjective:    Jo-Ann remains visible and engaged in milieu activities.  Continues to endorse feeling irritable in the morning but states \"that has been going on for a really long time.\"  Denies any irritability or agitation during time of encounter.  Mood is controlled.  Receptive to medication education.  Reports decrease in auditory hallucinations and states \"there are no commands.  They just come and go.\"  Paranoia also " "improving and less overt.  Maintaining ADLs, adequate sleep, and appetite.  Identifies adequate safety plan protective factors against suicide.  Expresses her readiness for discharge near end of week.    Behavior over the last 24 hours: slowly improving.   Sleep: normal  Appetite: normal  Medication side effects: No   ROS: no complaints, all other systems are negative    Objective :  Temp:  [97.9 °F (36.6 °C)-98.4 °F (36.9 °C)] 98.4 °F (36.9 °C)  HR:  [80-84] 84  BP: (145-152)/(80-85) 145/85  Resp:  [18] 18  SpO2:  [94 %] 94 %    Mental Status Evaluation:  Appearance:  age appropriate, casually dressed, blond hair   Behavior:  Cooperative   Speech:  Normal pitch/volume   Mood:  \"good\"   Affect:  constricted, mood-congruent, redirectable   Thought Process:  Circumstantial   Associations: Circumstantial   Thought Content:  Less paranoid, bizarre delusions that are chronic and baseline   Perceptual Disturbances: Decreased AH, noncommanding in nature, \"they come and go\"   Risk Potential: Suicidal ideation -none at present  Homicidal ideation -none  Potential for aggression -no   Sensorium:  oriented to person, place, time/date, and situation   Memory:  recent and remote memory grossly intact   Consciousness:  alert and awake   Attention/Concentration: attention span and concentration are age appropriate   Insight:  limited   Judgment: limited   Gait/Station: normal gait/station, normal balance   Motor Activity: no abnormal movements       Lab Results: I have reviewed the following results:  CBC:   Lab Results   Component Value Date    WBC 11.30 (H) 04/22/2025    RBC 4.44 04/22/2025    HGB 13.5 04/22/2025    HCT 42.0 04/22/2025    MCV 95 04/22/2025     04/22/2025    MCH 30.4 04/22/2025    MCHC 32.1 04/22/2025    RDW 14.7 04/22/2025    MPV 9.5 04/22/2025    NEUTROABS 7.21 04/22/2025     Clozapine:   Lab Results   Component Value Date    CLOZAPINE 655 04/26/2025         Counseling / Coordination of " Care:    Patient's progress discussed with staff in treatment team meeting.  Medication changes reviewed with staff in treatment team meeting.  Patient's progress reviewed with nursing staff.  Medications, treatment progress and treatment plan reviewed with patient.  Medication education provided to patient.  Patient's progress reviewed with case management staff.  Patient's diagnosis and treatment indicated reviewed with patient.  Importance of medication and treatment compliance reviewed with patient.  Educated on importance of medication and treatment compliance.  Discussed with patient acceptance of mental illness diagnosis and need for ongoing treatment after discharge.  Cognitive techniques utilized during the session.  Reassurance and supportive therapy provided.  Reoriented to reality and reassured.  Group attendance encouraged.  Encouraged participation in milieu and group therapy on the unit.  Crisis/safety plan discussed with patient.      JARED Bolton 04/29/25

## 2025-04-30 PROCEDURE — 99232 SBSQ HOSP IP/OBS MODERATE 35: CPT | Performed by: PSYCHIATRY & NEUROLOGY

## 2025-04-30 RX ADMIN — FOLIC ACID 1 MG: 1 TABLET ORAL at 08:19

## 2025-04-30 RX ADMIN — BISACODYL 10 MG: 5 TABLET, COATED ORAL at 21:22

## 2025-04-30 RX ADMIN — OXYBUTYNIN CHLORIDE 5 MG: 5 TABLET ORAL at 17:08

## 2025-04-30 RX ADMIN — CYANOCOBALAMIN TAB 500 MCG 500 MCG: 500 TAB at 08:19

## 2025-04-30 RX ADMIN — RISPERIDONE 2 MG: 2 TABLET, FILM COATED ORAL at 08:19

## 2025-04-30 RX ADMIN — ATORVASTATIN CALCIUM 10 MG: 10 TABLET, FILM COATED ORAL at 17:08

## 2025-04-30 RX ADMIN — HYDROCORTISONE: 25 CREAM TOPICAL at 17:43

## 2025-04-30 RX ADMIN — LAMOTRIGINE 100 MG: 100 TABLET ORAL at 08:19

## 2025-04-30 RX ADMIN — OXYBUTYNIN CHLORIDE 5 MG: 5 TABLET ORAL at 08:19

## 2025-04-30 RX ADMIN — PANTOPRAZOLE SODIUM 40 MG: 40 TABLET, DELAYED RELEASE ORAL at 08:32

## 2025-04-30 RX ADMIN — GUAIFENESIN 600 MG: 600 TABLET ORAL at 08:19

## 2025-04-30 RX ADMIN — LIDOCAINE 1 PATCH: 50 PATCH CUTANEOUS at 10:36

## 2025-04-30 RX ADMIN — LAMOTRIGINE 100 MG: 100 TABLET ORAL at 17:08

## 2025-04-30 RX ADMIN — THIAMINE HCL TAB 100 MG 100 MG: 100 TAB at 08:19

## 2025-04-30 RX ADMIN — ESCITALOPRAM OXALATE 10 MG: 10 TABLET ORAL at 08:19

## 2025-04-30 RX ADMIN — HYDROXYZINE HYDROCHLORIDE 100 MG: 50 TABLET, FILM COATED ORAL at 18:21

## 2025-04-30 RX ADMIN — LORAZEPAM 0.5 MG: 0.5 TABLET ORAL at 15:13

## 2025-04-30 RX ADMIN — NICOTINE 1 PATCH: 21 PATCH, EXTENDED RELEASE TRANSDERMAL at 10:36

## 2025-04-30 RX ADMIN — RISPERIDONE 2 MG: 2 TABLET, FILM COATED ORAL at 15:13

## 2025-04-30 RX ADMIN — OMEGA-3 FATTY ACIDS CAP 1000 MG 1000 MG: 1000 CAP at 08:19

## 2025-04-30 RX ADMIN — CLOZAPINE 450 MG: 100 TABLET ORAL at 17:08

## 2025-04-30 RX ADMIN — BISACODYL 10 MG: 5 TABLET, COATED ORAL at 08:19

## 2025-04-30 RX ADMIN — OXYCODONE HYDROCHLORIDE AND ACETAMINOPHEN 1 TABLET: 5; 325 TABLET ORAL at 21:23

## 2025-04-30 RX ADMIN — GUAIFENESIN 600 MG: 600 TABLET ORAL at 21:23

## 2025-04-30 RX ADMIN — Medication 1000 UNITS: at 08:19

## 2025-04-30 RX ADMIN — BENZTROPINE MESYLATE 1 MG: 1 TABLET ORAL at 21:23

## 2025-04-30 RX ADMIN — OXYCODONE HYDROCHLORIDE AND ACETAMINOPHEN 1 TABLET: 5; 325 TABLET ORAL at 08:32

## 2025-04-30 RX ADMIN — LORAZEPAM 0.5 MG: 0.5 TABLET ORAL at 08:19

## 2025-04-30 NOTE — NURSING NOTE
Pt calm and cooperative with assessment although minimal due to sleeping. Denies SI/HI/AH/VH and pain. Compliant with meds. Withdrawn to room. Q 15 minute checks ongoing.

## 2025-04-30 NOTE — PROGRESS NOTES
04/30/25    Team Meeting   Meeting Type Daily Rounds   Initial Conference Date 04/30/25   Team Members Present   Team Members Present Physician;Nurse;;Occupational Therapist   Physician Team Member Florencio Manzano MD   Nursing Team Member Leo JIMENES   Social Work Team Member Florian BURROUGHS   OT Team Member Pope BETTYE   Patient/Family Present   Patient Present No   Patient's Family Present No   201, sleeping, med complaint, baseline delusions, redirects, appears anx, ACT visiting pt to day at 1pm, awaiting approval for Mini, d/c Fri

## 2025-04-30 NOTE — PLAN OF CARE
Problem: Depression  Goal: Refrain from harming self  Description: Interventions:- Monitor patient closely, per order - Supervise medication ingestion, monitor effects and side effects   Outcome: Progressing     Problem: Anxiety  Goal: Anxiety is at manageable level  Description: Interventions:- Assess and monitor patient's anxiety level. - Monitor for signs and symptoms (heart palpitations, chest pain, shortness of breath, headaches, nausea, feeling jumpy, restlessness, irritable, apprehensive). - Collaborate with interdisciplinary team and initiate plan and interventions as ordered.- [place] patient to unit/surroundings- Explain treatment plan- Encourage participation in care- Encourage verbalization of concerns/fears- Identify coping mechanisms- Assist in developing anxiety-reducing skills- Administer/offer alternative therapies- Limit or eliminate stimulants  Interventions:- Assess and monitor patient's anxiety level. - Monitor for signs and symptoms (heart palpitations, chest pain, shortness of breath, headaches, nausea, feeling jumpy, restlessness, irritable, apprehensive). - Collaborate with interdisciplinary team and initiate plan and interventions as ordered.- [place] patient to unit/surroundings- Explain treatment plan- Encourage participation in care- Encourage verbalization of concerns/fears- Identify coping mechanisms- Assist in developing anxiety-reducing skills- Administer/offer alternative therapies- Limit or eliminate stimulants  Outcome: Progressing

## 2025-04-30 NOTE — PROGRESS NOTES
Progress Note - Behavioral Health   Name: Jo-Ann Lamb 54 y.o. female I MRN: [id number]  Unit/Bed#: -01 I Date of Admission: 4/21/2025   Date of Service: 4/30/2025 I Hospital Day: 9        Assessment & Plan  Schizoaffective disorder, bipolar type (HCC)  Continue the following:   Increase Clozaril 450 mg PO QHS for ongoing symptoms of psychosis and utilization of PRN antipsychotics   Risperdal 2 mg twice daily; Plan is to transition to Uzedy 100 mg SQ monthly injection to assist with medication compliance in the outpatient setting. Awaiting insurance approval.  Lamictal 100 mg twice daily  Ativan 0.5 mg twice daily  Lexapro 10 mg daily started 4/23/2025 for depression  Post-traumatic stress disorder, chronic    LYNN (generalized anxiety disorder)      Risks / Benefits of Treatment:  Risks, benefits, and possible side effects of medications explained to patient and patient verbalizes understanding and agreement for treatment.      Progress Toward Goals: Improving.  Irritability and agitation improved during the morning hours per patient report.  Chronic delusions persists, less paranoid.  Plan is to optimize Clozaril and increase 450 mg nightly for ongoing symptoms of psychosis.  Frequently utilizes PRN risperdal for auditory hallucinations.  Describes AH improving and no longer commanding.  States she will be okay with initiating Uzedy on outpatient basis should insurance approval remain pending. Hopeful for discharge by end of week.  Appears to be approaching psychiatric baseline.  Collateral from ACT would be helpful in assisting proximity to baseline and assist with disposition planning.      Subjective:    Jo-Ann is visible in the milieu and attends unit activities.  Maintaining safety with no return of SI.  Reports decrease in auditory hallucinations that are no longer commanding in nature.  Some paranoia and bizarre delusions that are chronic persists but able to redirect.  States she feels the Clozaril is  "working because \"I did not wake up agitated or irritable this morning.\"  Receptive to medication education and agreeable to further titrate Clozaril.  Hopeful for discharge by end of week.  States she would be okay with continuing oral Risperdal and transitioning to BLANCA in the outpatient setting.  Has meeting this afternoon with ACT that she is looking forward to.    Behavior over the last 24 hours: slowly improving.   Sleep: normal  Appetite: normal  Medication side effects: No   ROS: no complaints, all other systems are negative    Objective :  Temp:  [97.5 °F (36.4 °C)] 97.5 °F (36.4 °C)  HR:  [91-93] 93  BP: (122-136)/(83-96) 122/83  Resp:  [16] 16  SpO2:  [91 %-95 %] 91 %  O2 Device: None (Room air)    Mental Status Evaluation:  Appearance:  age appropriate, casually dressed, blond hair   Behavior:  Cooperative   Speech:  Normal pitch/volume   Mood:  \"good\"   Affect:  constricted, mood-congruent, redirectable   Thought Process:  Circumstantial   Associations: Circumstantial   Thought Content:  Less paranoid, bizarre delusions that are chronic and baseline   Perceptual Disturbances: Decreased AH, noncommanding in nature, \"they come and go\"   Risk Potential: Suicidal ideation -none at present  Homicidal ideation -none  Potential for aggression -no   Sensorium:  oriented to person, place, time/date, and situation   Memory:  recent and remote memory grossly intact   Consciousness:  alert and awake   Attention/Concentration: attention span and concentration are age appropriate   Insight:  limited   Judgment: limited   Gait/Station: normal gait/station, normal balance   Motor Activity: no abnormal movements       Lab Results: I have reviewed the following results:  CBC:   Lab Results   Component Value Date    WBC 11.30 (H) 04/22/2025    RBC 4.44 04/22/2025    HGB 13.5 04/22/2025    HCT 42.0 04/22/2025    MCV 95 04/22/2025     04/22/2025    MCH 30.4 04/22/2025    MCHC 32.1 04/22/2025    RDW 14.7 04/22/2025    " MPV 9.5 04/22/2025    NEUTROABS 7.21 04/22/2025     Clozapine:   Lab Results   Component Value Date    CLOZAPINE 655 04/26/2025     Counseling / Coordination of Care:    Patient's progress discussed with staff in treatment team meeting.  Medication changes reviewed with staff in treatment team meeting.  Patient's progress reviewed with nursing staff.  Medications, treatment progress and treatment plan reviewed with patient.  Medication education provided to patient.  Patient's progress reviewed with case management staff.  Patient's diagnosis and treatment indicated reviewed with patient.  Importance of medication and treatment compliance reviewed with patient.  Educated on importance of medication and treatment compliance.  Discussed with patient acceptance of mental illness diagnosis and need for ongoing treatment after discharge.  Cognitive techniques utilized during the session.  Reassurance and supportive therapy provided.  Reoriented to reality and reassured.  Group attendance encouraged.  Encouraged participation in milieu and group therapy on the unit.  Crisis/safety plan discussed with patient.      JARED Bolton 04/30/25

## 2025-04-30 NOTE — PROGRESS NOTES
"Progress Note - Jo-Ann Lamb 54 y.o. female MRN: [id number]    Unit/Bed#: [facility] 218-01 Encounter: [id number]        Subjective:   Patient seen and examined at bedside after reviewing the chart and discussing the case with the caring staff.      Patient examined at bedside.  Patient denies any acute symptoms.  States she has been feeling much better.     Physical Exam   Vitals: Blood pressure 122/83, pulse 93, temperature 97.5 °F (36.4 °C), temperature source Temporal, resp. rate 16, height 5' 1\" (1.549 m), weight 65.5 kg (144 lb 6.4 oz), last menstrual period 01/01/2020, SpO2 91%, not currently breastfeeding.,Body mass index is 27.28 kg/m².  Constitutional: Patient in no acute distress.  HEENT: PERR, EOMI, MMM.  Cardiovascular: Normal rate and regular rhythm.    Pulmonary/Chest: Effort normal and breath sounds normal.   Abdomen: Soft, + BS, NT.    Assessment/Plan:  Jo-Ann Lamb is a(n) 54 y.o. year old female with Schizoaffective DO     HTN.  No longer taking Losartan 50 mg daily.  Will continue to monitor.   HLD.  Continue fish oil daily.  Restart atorvastatin 10 mg daily 4/24.     Asthma.  Patient is on Breo Ellipta daily (Advair nonform), albuterol inhaler as needed.   Allergic rhinitis.  Stable.  Declines previous Flonase -believes it is causing sore throat.   GERD.  Patient on Protonix 40 mg daily.   Urge incontinence.  Continue oxybutynin 5 mg twice daily.   Constipation/hemorrhoids.  Increase Dulcolax 10 mg twice daily 4/29/2025.  Miralax or Senokot as needed.  Apply Anusol-HC as needed.    Chronic back w/ radiculopathy.  Patient follows with pain management.  Patient on Percocet 5-325 mg q8h as needed for breakthrough pain.  PDMP reviewed.  Patient also receiving epidural injections to lower back outpt.  Tylenol as needed for mild-moderate pain.  Patient may get Lidoderm patch over the back.  Tobacco abuse.  NRT.   Vitamin D deficiency.  Continue vitamin D3 1000 units daily.  Recheck levels.   Vitamin B12 " deficiency.  Continue B12 500 mcg daily.  Recheck levels.   Sore throat/mouth pain.  Cough drops or Magic mouthwash as needed per Dr. Steel.  Chloraseptic spray as needed 4/25.   Chest congestion/cough.  Patient treated with azithromycin for mycoplasma pneumonia 4/10.  Start Augmentin 875-125 mg twice daily x 7 days.  Patient with frequent infections -needs to follow up outpatient.     The patient was discussed with Dr. Steel and he is in agreement with the above note.

## 2025-04-30 NOTE — NURSING NOTE
Patient cooperative with staff. Denies SI/HI. Report her AH are controlled. Patient appears restless, observed pacing the halls throughout the day, withdrawn to self. Educated on PRNs available for symptom management. Patient appropriate in conversation. Paranoid delusions persist, although patient offers insight into recognizing her thoughts are related mental disorder. At nurses station for needs. Compliant with scheduled medications. Will continue to monitor. Continual q15 min rounding and safety checks in place.

## 2025-04-30 NOTE — SOCIAL WORK
OLIVE received the below message from tyrone Amos's ACT team nurse    Ashutosh Leblanc, this is Dandre with the acting. I had the opportunity to visit with Jo-Ann today and just wanted to touch base with you and she she's definitely doing better than when she was admitted. But Jo-Ann did admit some things and that she held back telling some of you she's been calling our office thinking that one of your doctors is performing plastic surgery on her to make her look old and some other things. We were wondering if it was possible to maybe discharge her earlier next week instead of Friday. Anyway, I know it's a long message If you get the chance, please give me a call [phone]. Thanks so much, Deana.      OLIVE left return  [phone] with Dandre to further discuss

## 2025-04-30 NOTE — ASSESSMENT & PLAN NOTE
Continue the following:   Increase Clozaril 450 mg PO QHS for ongoing symptoms of psychosis and utilization of PRN antipsychotics   Risperdal 2 mg twice daily; Plan is to transition to Uzedy 100 mg SQ monthly injection to assist with medication compliance in the outpatient setting. Awaiting insurance approval.  Lamictal 100 mg twice daily  Ativan 0.5 mg twice daily  Lexapro 10 mg daily started 4/23/2025 for depression

## 2025-04-30 NOTE — TREATMENT TEAM
04/30/25 1820   [place] Anxiety Scale   Anxious Mood 4   Tension 4   Fears 4   Insomnia 0   Intellectual 0   Depressed Mood 3   Somatic Complaints: Muscular 3   Somatic Complaints: Sensory 3   Cardiovascular Symptoms 0   Respiratory Symptoms 0   Gastrointestinal Symptoms 0   Genitourinary Symptoms 0   Autonomic Symptoms 3   Behavior at Interview 3   [place] Anxiety Score 27     Patient presented to nurses station reporting anxiety; would not elaborate on cause. Reports she feels restless. Patient anxious and fidgeting in conversation. Ham 27. Atarax 100 mg Po given. Will reassess.

## 2025-04-30 NOTE — PLAN OF CARE
Problem: Ineffective Coping  Goal: Participates in unit activities  Description: Interventions:- Provide therapeutic environment - Provide required programming - Redirect inappropriate behaviors   Outcome: Progressing     Problem: Risk for Self Injury/Neglect  Goal: Attend and participate in unit activities, including therapeutic, recreational, and educational groups  Description: Interventions:- Provide therapeutic and educational activities daily, encourage attendance and participation, and document same in the medical record- Obtain collateral information, encourage visitation and family involvement in care   Interventions:- Provide therapeutic and educational activities daily, encourage attendance and participation, and document same in the medical record - Provide appropriate opportunities for reminiscence - Provide a consistent daily routine - Encourage family contact/visitation   Outcome: Progressing     Problem: Depression  Goal: Attend and participate in unit activities, including therapeutic, recreational, and educational groups  Description: Interventions:- Provide therapeutic and educational activities daily, encourage attendance and participation, and document same in the medical record   Outcome: Progressing     Problem: Risk for Violence/Aggression Toward Others  Goal: Attend and participate in unit activities, including therapeutic, recreational, and educational groups  Description: Interventions:- Provide therapeutic and educational activities daily, encourage attendance and participation, and document same in the medical record   Outcome: Progressing     Problem: Alteration in Orientation  Goal: Attend and participate in unit activities, including therapeutic, recreational, and educational groups  Description: Interventions:- Provide therapeutic and educational activities daily, encourage attendance and participation, and document same in the medical record- Obtain collateral information,  encourage visitation and family involvement in care   Interventions:- Provide therapeutic and educational activities daily, encourage attendance and participation, and document same in the medical record - Provide appropriate opportunities for reminiscence - Provide a consistent daily routine - Encourage family contact/visitation   Outcome: Progressing

## 2025-04-30 NOTE — SOCIAL WORK
Cm met with pt on unit. Pt was pleasant and appeared in good spirits.  She continues med adjust and would like to d/c at the end of the week. Pt states her plan is to return home and continue with ACT services.

## 2025-05-01 PROCEDURE — 99232 SBSQ HOSP IP/OBS MODERATE 35: CPT | Performed by: PSYCHIATRY & NEUROLOGY

## 2025-05-01 RX ORDER — CLOZAPINE 50 MG/1
450 TABLET ORAL
Qty: 270 TABLET | Refills: 0 | Status: SHIPPED | OUTPATIENT
Start: 2025-05-01 | End: 2025-05-31

## 2025-05-01 RX ORDER — ESCITALOPRAM OXALATE 10 MG/1
10 TABLET ORAL DAILY
Qty: 30 TABLET | Refills: 0 | Status: SHIPPED | OUTPATIENT
Start: 2025-05-02 | End: 2025-05-01

## 2025-05-01 RX ORDER — LAMOTRIGINE 100 MG/1
100 TABLET ORAL 2 TIMES DAILY
Qty: 60 TABLET | Refills: 0 | Status: SHIPPED | OUTPATIENT
Start: 2025-05-01 | End: 2025-05-01

## 2025-05-01 RX ORDER — ESCITALOPRAM OXALATE 10 MG/1
10 TABLET ORAL DAILY
Qty: 30 TABLET | Refills: 0 | Status: SHIPPED | OUTPATIENT
Start: 2025-05-02 | End: 2025-06-01

## 2025-05-01 RX ORDER — LAMOTRIGINE 100 MG/1
100 TABLET ORAL 2 TIMES DAILY
Qty: 60 TABLET | Refills: 0 | Status: SHIPPED | OUTPATIENT
Start: 2025-05-01 | End: 2025-05-31

## 2025-05-01 RX ORDER — LORAZEPAM 0.5 MG/1
0.5 TABLET ORAL 2 TIMES DAILY
Qty: 60 TABLET | Refills: 0 | Status: SHIPPED | OUTPATIENT
Start: 2025-05-01 | End: 2025-05-31

## 2025-05-01 RX ORDER — METHOCARBAMOL 500 MG/1
500 TABLET, FILM COATED ORAL EVERY 6 HOURS PRN
Status: DISCONTINUED | OUTPATIENT
Start: 2025-05-01 | End: 2025-05-05 | Stop reason: HOSPADM

## 2025-05-01 RX ORDER — CLOZAPINE 50 MG/1
450 TABLET ORAL
Qty: 270 TABLET | Refills: 0 | Status: SHIPPED | OUTPATIENT
Start: 2025-05-01 | End: 2025-05-01

## 2025-05-01 RX ORDER — RISPERIDONE 2 MG/1
2 TABLET ORAL 2 TIMES DAILY
Status: DISCONTINUED | OUTPATIENT
Start: 2025-05-01 | End: 2025-05-01

## 2025-05-01 RX ORDER — LORAZEPAM 0.5 MG/1
0.5 TABLET ORAL 2 TIMES DAILY
Qty: 60 TABLET | Refills: 0 | Status: SHIPPED | OUTPATIENT
Start: 2025-05-01 | End: 2025-05-01

## 2025-05-01 RX ADMIN — THIAMINE HCL TAB 100 MG 100 MG: 100 TAB at 08:03

## 2025-05-01 RX ADMIN — CLOZAPINE 450 MG: 100 TABLET ORAL at 17:36

## 2025-05-01 RX ADMIN — OXYBUTYNIN CHLORIDE 5 MG: 5 TABLET ORAL at 17:36

## 2025-05-01 RX ADMIN — BISACODYL 10 MG: 5 TABLET, COATED ORAL at 08:02

## 2025-05-01 RX ADMIN — PANTOPRAZOLE SODIUM 40 MG: 40 TABLET, DELAYED RELEASE ORAL at 08:03

## 2025-05-01 RX ADMIN — GUAIFENESIN 600 MG: 600 TABLET ORAL at 20:50

## 2025-05-01 RX ADMIN — CYANOCOBALAMIN TAB 500 MCG 500 MCG: 500 TAB at 08:03

## 2025-05-01 RX ADMIN — LIDOCAINE 1 PATCH: 50 PATCH CUTANEOUS at 08:47

## 2025-05-01 RX ADMIN — RISPERIDONE 100 MG: 200 INJECTION, SUSPENSION, EXTENDED RELEASE SUBCUTANEOUS at 13:47

## 2025-05-01 RX ADMIN — LAMOTRIGINE 100 MG: 100 TABLET ORAL at 08:03

## 2025-05-01 RX ADMIN — FOLIC ACID 1 MG: 1 TABLET ORAL at 08:03

## 2025-05-01 RX ADMIN — OXYBUTYNIN CHLORIDE 5 MG: 5 TABLET ORAL at 08:03

## 2025-05-01 RX ADMIN — OXYCODONE HYDROCHLORIDE AND ACETAMINOPHEN 1 TABLET: 5; 325 TABLET ORAL at 16:36

## 2025-05-01 RX ADMIN — LORAZEPAM 0.5 MG: 0.5 TABLET ORAL at 08:03

## 2025-05-01 RX ADMIN — HYDROCORTISONE: 25 CREAM TOPICAL at 10:12

## 2025-05-01 RX ADMIN — OMEGA-3 FATTY ACIDS CAP 1000 MG 1000 MG: 1000 CAP at 08:03

## 2025-05-01 RX ADMIN — LORAZEPAM 0.5 MG: 0.5 TABLET ORAL at 15:27

## 2025-05-01 RX ADMIN — Medication 1000 UNITS: at 08:03

## 2025-05-01 RX ADMIN — NICOTINE 1 PATCH: 21 PATCH, EXTENDED RELEASE TRANSDERMAL at 08:47

## 2025-05-01 RX ADMIN — ATORVASTATIN CALCIUM 10 MG: 10 TABLET, FILM COATED ORAL at 16:36

## 2025-05-01 RX ADMIN — LAMOTRIGINE 100 MG: 100 TABLET ORAL at 17:36

## 2025-05-01 RX ADMIN — BISACODYL 10 MG: 5 TABLET, COATED ORAL at 20:50

## 2025-05-01 RX ADMIN — ESCITALOPRAM OXALATE 10 MG: 10 TABLET ORAL at 08:03

## 2025-05-01 RX ADMIN — OXYCODONE HYDROCHLORIDE AND ACETAMINOPHEN 1 TABLET: 5; 325 TABLET ORAL at 08:19

## 2025-05-01 RX ADMIN — POLYETHYLENE GLYCOL 3350 17 G: 17 POWDER, FOR SOLUTION ORAL at 10:12

## 2025-05-01 RX ADMIN — RISPERIDONE 2 MG: 2 TABLET, FILM COATED ORAL at 08:03

## 2025-05-01 RX ADMIN — RISPERIDONE 1 MG: 1 TABLET, ORALLY DISINTEGRATING ORAL at 11:04

## 2025-05-01 RX ADMIN — GUAIFENESIN 600 MG: 600 TABLET ORAL at 08:03

## 2025-05-01 NOTE — PROGRESS NOTES
"Progress Note - Jo-Ann Lamb 54 y.o. female MRN: [id number]    Unit/Bed#: [facility] 218-01 Encounter: [id number]        Subjective:   Patient seen and examined at bedside after reviewing the chart and discussing the case with the caring staff.      Patient examined at bedside.  Patient requesting a muscle relaxer.     Physical Exam   Vitals: Blood pressure 144/96, pulse 93, temperature 98.3 °F (36.8 °C), temperature source Temporal, resp. rate 18, height 5' 1\" (1.549 m), weight 65.5 kg (144 lb 6.4 oz), last menstrual period 01/01/2020, SpO2 95%, not currently breastfeeding.,Body mass index is 27.28 kg/m².  Constitutional: Patient in no acute distress.  HEENT: PERR, EOMI, MMM.  Cardiovascular: Normal rate and regular rhythm.    Pulmonary/Chest: Effort normal and breath sounds normal.   Abdomen: Soft, + BS, NT.    Assessment/Plan:  Jo-Ann Lamb is a(n) 54 y.o. year old female with Schizoaffective DO     HTN.  No longer taking Losartan 50 mg daily.  Will continue to monitor.   HLD.  Continue fish oil daily.  Restart atorvastatin 10 mg daily 4/24.     Asthma.  Patient is on Breo Ellipta daily (Advair nonform), albuterol inhaler as needed.   Allergic rhinitis.  Stable.  Declines previous Flonase -believes it is causing sore throat.   GERD.  Patient on Protonix 40 mg daily.   Urge incontinence.  Continue oxybutynin 5 mg twice daily.   Constipation/hemorrhoids.  Increase Dulcolax 10 mg twice daily 4/29/2025.  Miralax or Senokot as needed.  Apply Anusol-HC as needed.    Chronic back w/ radiculopathy.  Patient follows with pain management.  Patient on Percocet 5-325 mg q8h as needed for breakthrough pain.  PDMP reviewed.  Patient also receiving epidural injections to lower back outpt.  Tylenol as needed for mild-moderate pain.  Patient may get Lidoderm patch over the back.  Robaxin as needed for muscle spasms.   Tobacco abuse.  NRT.   Vitamin D deficiency.  Continue vitamin D3 1000 units daily.  Recheck levels.   Vitamin B12 " deficiency.  Continue B12 500 mcg daily.  Recheck levels.   Sore throat/mouth pain.  Cough drops or Magic mouthwash as needed per Dr. Steel.  Chloraseptic spray as needed 4/25.   Chest congestion/cough.  Patient treated with azithromycin for mycoplasma pneumonia 4/10.  Start Augmentin 875-125 mg twice daily x 7 days.  Patient with frequent infections -needs to follow up outpatient.     The patient was discussed with Dr. Steel and he is in agreement with the above note.

## 2025-05-01 NOTE — PROGRESS NOTES
"Progress Note - Behavioral Health   Name: Jo-Ann Lamb 54 y.o. female I MRN: [id number]  Unit/Bed#: -01 I Date of Admission: 4/21/2025   Date of Service: 5/1/2025 I Hospital Day: 10        Assessment & Plan  Schizoaffective disorder, bipolar type (HCC)  Continue the following:   Continue Clozaril 450 mg PO QHS; increased 4/30/25. Repeat Clozaril level 5/5/25 @ 6 AM.  Start Uzedy 100 mg SQ monthly injection to assist with medication compliance in the outpatient setting. Next dose due 5/31/25; covered by insurance  Discontinue Risperdal 2 mg PO BID   Lamictal 100 mg twice daily  Ativan 0.5 mg twice daily  Lexapro 10 mg daily started 4/23/2025 for depression  Post-traumatic stress disorder, chronic    LYNN (generalized anxiety disorder)      Risks / Benefits of Treatment:  Risks, benefits, and possible side effects of medications explained to patient and patient verbalizes understanding and agreement for treatment.      Progress Toward Goals: Improving.  Maintaining safety and mood control with no return of SI.  Chronic paranoid delusions persist.  ACT met with patient yesterday who reports patient is approaching her baseline.  Will start Uzedy 100 mg subcutaneous monthly injection today to ensure medication compliance.  Discontinue Risperdal 2 mg PO BID. Obtain Clozaril level 5/5/2025 at 6 AM.  Plan is to discharge home Monday, 5/5/2025 with ACT services. Meds submitted to [place], [place] 5/1/25 in anticipation of upcoming discharge.       Subjective:    Jo-Ann remains visible and participates in unit activities.  Calm and cooperative.  Continues to endorse ongoing chronic paranoid delusions that are at baseline.  Able to redirect and reality test.  Describes the medication helps \"keep my mind together.\"  Denies SI and has been maintaining safety on unit.  Maintaining ADLs and adequate sleep.  Forward thinking.  Identifies adequate safety plan and feels ACT is extremely supportive in the outpatient " "setting.    Behavior over the last 24 hours: slowly improving.   Sleep: normal  Appetite: normal  Medication side effects: No   ROS: no complaints, all other systems are negative    Objective :  Temp:  [98.1 °F (36.7 °C)-98.3 °F (36.8 °C)] 98.3 °F (36.8 °C)  HR:  [92-93] 93  BP: (127-144)/(79-96) 144/96  Resp:  [18] 18  SpO2:  [94 %-95 %] 95 %    Mental Status Evaluation:  Appearance:  age appropriate, casually dressed, blond hair   Behavior:  Cooperative   Speech:  Normal pitch/volume   Mood:  \"good\"   Affect:  constricted, mood-congruent, redirectable   Thought Process:  Goal directed   Associations: Circumstantial   Thought Content:  Paranoid and bizarre delusions that are chronic and baseline, less intense than upon admission   Perceptual Disturbances: Decreased AH, noncommanding in nature, described as chronic   Risk Potential: Suicidal ideation -none at present  Homicidal ideation -none  Potential for aggression -no   Sensorium:  oriented to person, place, time/date, and situation   Memory:  recent and remote memory grossly intact   Consciousness:  alert and awake   Attention/Concentration: attention span and concentration are age appropriate   Insight:  limited   Judgment: limited   Gait/Station: normal gait/station, normal balance   Motor Activity: no abnormal movements       Lab Results: I have reviewed the following results:  CBC:   Lab Results   Component Value Date    WBC 11.30 (H) 04/22/2025    RBC 4.44 04/22/2025    HGB 13.5 04/22/2025    HCT 42.0 04/22/2025    MCV 95 04/22/2025     04/22/2025    MCH 30.4 04/22/2025    MCHC 32.1 04/22/2025    RDW 14.7 04/22/2025    MPV 9.5 04/22/2025    NEUTROABS 7.21 04/22/2025     Clozapine:   Lab Results   Component Value Date    CLOZAPINE 655 04/26/2025     Counseling / Coordination of Care:    Patient's progress discussed with staff in treatment team meeting.  Medication changes reviewed with staff in treatment team meeting.  Patient's progress reviewed " with nursing staff.  Medications, treatment progress and treatment plan reviewed with patient.  Medication education provided to patient.  Patient's progress reviewed with case management staff.  Patient's diagnosis and treatment indicated reviewed with patient.  Importance of medication and treatment compliance reviewed with patient.  Educated on importance of medication and treatment compliance.  Discussed with patient acceptance of mental illness diagnosis and need for ongoing treatment after discharge.  Cognitive techniques utilized during the session.  Reassurance and supportive therapy provided.  Reoriented to reality and reassured.  Group attendance encouraged.  Encouraged participation in milieu and group therapy on the unit.  Crisis/safety plan discussed with patient.      JARED Bolton 05/01/25

## 2025-05-01 NOTE — NURSING NOTE
"Patient cooperative with staff. Denies SI/HI. Reports intermittent AH, but controlled. Paranoid delusions remains, patient reporting, \"The doctors are reconstructing my face, please tell them to stop.\" Patient able to redirect self and reality test that thoughts may be due to her mental disorder. Patient reports she is \"excited about starting Uzedy.\" Patient appropriate in conversation and making need known. Visible on the unit, pacing the halls; withdrawn to self. Compliant with scheduled medications. Will continue to monitor. Continual q15 min rounding and safety checks in place.   "

## 2025-05-01 NOTE — PLAN OF CARE
Problem: Ineffective Coping  Goal: Demonstrates healthy coping skills  Outcome: Not Progressing     Problem: Depression  Goal: Refrain from isolation  Description: Interventions:- Develop a trusting relationship - Encourage socialization   Interventions:- Develop a trusting relationship - Encourage socialization   Outcome: Not Progressing

## 2025-05-01 NOTE — PROGRESS NOTES
05/01/25    Team Meeting   Meeting Type Daily Rounds   Initial Conference Date 05/01/25   Team Members Present   Team Members Present Physician;Nurse;;Occupational Therapist;   Physician Team Member Florencio LARSON, Florecita LARSON, Yair COLEMAN   Nursing Team Member Leo JIMENES   Care Management Team Member Luis Daniel HUIZAR   Social Work Team Member Florian BURROUGHS   OT Team Member Pope BETTYE Chowdary   Patient/Family Present   Patient Present No   Patient's Family Present No   201, [facility] [place] and [facility], Uzedy injection, Clozaril, ah and paranoid delusions reports are controlled, stating providers are doing plastic surgery to make her look ugly, reporting stiff legs, PRN, d/c Mon after levels drawn

## 2025-05-01 NOTE — ASSESSMENT & PLAN NOTE
Continue the following:   Continue Clozaril 450 mg PO QHS; increased 4/30/25. Repeat Clozaril level 5/5/25 @ 6 AM.  Start Uzedy 100 mg SQ monthly injection to assist with medication compliance in the outpatient setting. Next dose due 5/31/25; covered by insurance  Discontinue Risperdal 2 mg PO BID   Lamictal 100 mg twice daily  Ativan 0.5 mg twice daily  Lexapro 10 mg daily started 4/23/2025 for depression

## 2025-05-01 NOTE — NURSING NOTE
Patient requested medication for AH states he head feels messed and states group got her thinking about trauma. Administered Risperdal 1mg po will monitor for effectiveness

## 2025-05-02 PROCEDURE — 99232 SBSQ HOSP IP/OBS MODERATE 35: CPT | Performed by: PSYCHIATRY & NEUROLOGY

## 2025-05-02 RX ADMIN — THIAMINE HCL TAB 100 MG 100 MG: 100 TAB at 08:10

## 2025-05-02 RX ADMIN — OXYCODONE HYDROCHLORIDE AND ACETAMINOPHEN 1 TABLET: 5; 325 TABLET ORAL at 08:10

## 2025-05-02 RX ADMIN — ALUMINUM HYDROXIDE, MAGNESIUM HYDROXIDE, AND DIMETHICONE 30 ML: 200; 20; 200 SUSPENSION ORAL at 09:01

## 2025-05-02 RX ADMIN — FOLIC ACID 1 MG: 1 TABLET ORAL at 08:10

## 2025-05-02 RX ADMIN — RISPERIDONE 1 MG: 1 TABLET, ORALLY DISINTEGRATING ORAL at 13:51

## 2025-05-02 RX ADMIN — BENZTROPINE MESYLATE 1 MG: 1 TABLET ORAL at 13:51

## 2025-05-02 RX ADMIN — HYDROCORTISONE 1 APPLICATION: 25 CREAM TOPICAL at 17:09

## 2025-05-02 RX ADMIN — ATORVASTATIN CALCIUM 10 MG: 10 TABLET, FILM COATED ORAL at 15:44

## 2025-05-02 RX ADMIN — LAMOTRIGINE 100 MG: 100 TABLET ORAL at 08:09

## 2025-05-02 RX ADMIN — OXYBUTYNIN CHLORIDE 5 MG: 5 TABLET ORAL at 08:09

## 2025-05-02 RX ADMIN — BISACODYL 10 MG: 5 TABLET, COATED ORAL at 08:10

## 2025-05-02 RX ADMIN — LORAZEPAM 0.5 MG: 0.5 TABLET ORAL at 15:44

## 2025-05-02 RX ADMIN — CYANOCOBALAMIN TAB 500 MCG 500 MCG: 500 TAB at 08:09

## 2025-05-02 RX ADMIN — LAMOTRIGINE 100 MG: 100 TABLET ORAL at 17:03

## 2025-05-02 RX ADMIN — GUAIFENESIN 600 MG: 600 TABLET ORAL at 08:09

## 2025-05-02 RX ADMIN — HYDROXYZINE HYDROCHLORIDE 50 MG: 50 TABLET, FILM COATED ORAL at 14:17

## 2025-05-02 RX ADMIN — PANTOPRAZOLE SODIUM 40 MG: 40 TABLET, DELAYED RELEASE ORAL at 08:09

## 2025-05-02 RX ADMIN — Medication 1000 UNITS: at 08:09

## 2025-05-02 RX ADMIN — NICOTINE 1 PATCH: 21 PATCH, EXTENDED RELEASE TRANSDERMAL at 08:11

## 2025-05-02 RX ADMIN — GUAIFENESIN 600 MG: 600 TABLET ORAL at 20:45

## 2025-05-02 RX ADMIN — BISACODYL 10 MG: 5 TABLET, COATED ORAL at 20:45

## 2025-05-02 RX ADMIN — LIDOCAINE 1 PATCH: 50 PATCH CUTANEOUS at 08:11

## 2025-05-02 RX ADMIN — METHOCARBAMOL 500 MG: 500 TABLET ORAL at 11:06

## 2025-05-02 RX ADMIN — HYDROCORTISONE: 25 CREAM TOPICAL at 09:31

## 2025-05-02 RX ADMIN — LORAZEPAM 0.5 MG: 0.5 TABLET ORAL at 08:09

## 2025-05-02 RX ADMIN — OXYBUTYNIN CHLORIDE 5 MG: 5 TABLET ORAL at 17:03

## 2025-05-02 RX ADMIN — OMEGA-3 FATTY ACIDS CAP 1000 MG 1000 MG: 1000 CAP at 08:10

## 2025-05-02 RX ADMIN — CLOZAPINE 450 MG: 100 TABLET ORAL at 17:03

## 2025-05-02 RX ADMIN — OXYCODONE HYDROCHLORIDE AND ACETAMINOPHEN 1 TABLET: 5; 325 TABLET ORAL at 16:02

## 2025-05-02 RX ADMIN — ESCITALOPRAM OXALATE 10 MG: 10 TABLET ORAL at 08:09

## 2025-05-02 NOTE — PLAN OF CARE
Problem: Alteration in Thoughts and Perception  Goal: Refrain from acting on delusional thinking/internal stimuli  Description: Interventions:- Monitor patient closely, per order - Utilize least restrictive measures - Set reasonable limits, give positive feedback for acceptable - Administer medications as ordered and monitor of potential side effects  Outcome: Progressing  Goal: Agree to be compliant with medication regime, as prescribed and report medication side effects  Description: Interventions:- Offer appropriate PRN medication and supervise ingestion; conduct AIMS, as needed   Outcome: Progressing     Problem: Ineffective Coping  Goal: Demonstrates healthy coping skills  Outcome: Not Progressing  Goal: Participates in unit activities  Description: Interventions:- Provide therapeutic environment - Provide required programming - Redirect inappropriate behaviors   Outcome: Progressing

## 2025-05-02 NOTE — PROGRESS NOTES
"Progress Note - Jo-Ann Lamb 54 y.o. female MRN: [id number]    Unit/Bed#: [facility] 218-01 Encounter: [id number]        Subjective:   Patient seen and examined at bedside after reviewing the chart and discussing the case with the caring staff.      Patient examined at bedside.  Patient denies any acute symptoms.    Patient is being discharged Monday, 5/5/25.    Physical Exam   Vitals: Blood pressure 114/79, pulse (!) 113, temperature (!) 97.2 °F (36.2 °C), temperature source Temporal, resp. rate 16, height 5' 1\" (1.549 m), weight 65.5 kg (144 lb 6.4 oz), last menstrual period 01/01/2020, SpO2 95%, not currently breastfeeding.,Body mass index is 27.28 kg/m².  Constitutional: Patient in no acute distress.  HEENT: PERR, EOMI, MMM.  Cardiovascular: Normal rate and regular rhythm.    Pulmonary/Chest: Effort normal and breath sounds normal.   Abdomen: Soft, + BS, NT.    Assessment/Plan:  Jo-Ann Lamb is a(n) 54 y.o. year old female with Schizoaffective DO     HTN.  No longer taking Losartan 50 mg daily.  Will continue to monitor.   HLD.  Continue fish oil daily.  Restart atorvastatin 10 mg daily 4/24.     Asthma.  Patient is on Breo Ellipta daily (Advair nonform), albuterol inhaler as needed.   Allergic rhinitis.  Stable.  Declines previous Flonase -believes it is causing sore throat.   GERD.  Patient on Protonix 40 mg daily.   Urge incontinence.  Continue oxybutynin 5 mg twice daily.   Constipation/hemorrhoids.  Increase Dulcolax 10 mg twice daily 4/29/2025.  Miralax or Senokot as needed.  Apply Anusol-HC as needed.    Chronic back w/ radiculopathy.  Patient follows with pain management.  Patient on Percocet 5-325 mg q8h as needed for breakthrough pain.  PDMP reviewed.  Patient also receiving epidural injections to lower back outpt.  Tylenol as needed for mild-moderate pain.  Patient may get Lidoderm patch over the back.  Robaxin as needed for muscle spasms.   Tobacco abuse.  NRT.   Vitamin D deficiency.  Continue vitamin D3 " 1000 units daily.  Recheck levels.   Vitamin B12 deficiency.  Continue B12 500 mcg daily.  Recheck levels.   Sore throat/mouth pain.  Cough drops or Magic mouthwash as needed per Dr. Steel.  Chloraseptic spray as needed 4/25.   Chest congestion/cough.  Patient treated with azithromycin for mycoplasma pneumonia 4/10.  Start Augmentin 875-125 mg twice daily x 7 days.  Patient with frequent infections -needs to follow up outpatient.     The patient was discussed with Dr. Steel and he is in agreement with the above note.

## 2025-05-02 NOTE — NURSING NOTE
Patient withdrawn to room this evening. Minimal and uninterested in conversation. Patient continues to endorse AH; no mention of delusions this evening. Denies SI/HI. No behaviors or outbursts. Patient is compliant with scheduled medications; no PRNs given. Patient is able to make her needs known. Continuous q15 minute rounding and safety checks ongoing.

## 2025-05-02 NOTE — ASSESSMENT & PLAN NOTE
Continue the following:   Continue Clozaril 450 mg PO QHS; increased 4/30/25. Repeat Clozaril level 5/5/25 @ 6 AM.  Continue Uzedy 100 mg SQ monthly injection to assist with medication compliance in the outpatient setting. Next dose due 5/31/25; covered by insurance  Discontinue Risperdal 2 mg PO BID   Lamictal 100 mg twice daily  Ativan 0.5 mg twice daily  Lexapro 10 mg daily started 4/23/2025 for depression

## 2025-05-02 NOTE — NURSING NOTE
"Pt visible on unit throughout shift. Initially verbalized feeling \"calm in my head\" in morning after receiving Uzedy BLANCA the previous shift. Verbalizes good visit with Kj from ACT team. Utilizing PRN pain medications as prescribed for complaints of back pain. Safety precautions maintained. Will continue to monitor and assess.   "

## 2025-05-02 NOTE — PROGRESS NOTES
05/02/25    Team Meeting   Meeting Type Daily Rounds   Initial Conference Date 05/02/25   Team Members Present   Team Members Present Physician;Nurse;;Occupational Therapist   Physician Team Member Florencio MALONE   Nursing Team Member Mirella JIMENES   Care Management Team Member Luis Daniel HUIZAR   Social Work Team Member Florian BURROUGHS   OT Team Member Pope BETTYE   Patient/Family Present   Patient Present No   Patient's Family Present No   201, Uzedy injection yesterday, denies all, reports feeling calm and even, no verbalized delusions after given cogentin, d/c Mon

## 2025-05-02 NOTE — PROGRESS NOTES
Progress Note - Behavioral Health   Name: Jo-Ann Lamb 54 y.o. female I MRN: [id number]  Unit/Bed#: [facility] 218-01 I Date of Admission: 4/21/2025   Date of Service: 5/2/2025 I Hospital Day: 11     Assessment & Plan  Schizoaffective disorder, bipolar type (HCC)  Continue the following:   Continue Clozaril 450 mg PO QHS; increased 4/30/25. Repeat Clozaril level 5/5/25 @ 6 AM.  Continue Uzedy 100 mg SQ monthly injection to assist with medication compliance in the outpatient setting. Next dose due 5/31/25; covered by insurance  Discontinue Risperdal 2 mg PO BID   Lamictal 100 mg twice daily  Ativan 0.5 mg twice daily  Lexapro 10 mg daily started 4/23/2025 for depression  Post-traumatic stress disorder, chronic  Counseling on unit  LYNN (generalized anxiety disorder)  See above    Current Medications:    Current Facility-Administered Medications:     atorvastatin (LIPITOR) tablet 10 mg, Oral, Daily With Dinner    bisacodyl (DULCOLAX) EC tablet 10 mg, Oral, BID    Cholecalciferol (VITAMIN D3) tablet 1,000 Units, Oral, Daily    cloZAPine (CLOZARIL) tablet 450 mg, Oral, After Dinner    cyanocobalamin (VITAMIN B-12) tablet 500 mcg, Oral, Daily    escitalopram (LEXAPRO) tablet 10 mg, Oral, Daily    fish oil capsule 1,000 mg, Oral, Daily    Fluticasone Furoate-Vilanterol 100-25 mcg/actuation 1 puff, Inhalation, Daily    folic acid (FOLVITE) tablet 1 mg, Oral, Daily    guaiFENesin (MUCINEX) 12 hr tablet 600 mg, Oral, Q12H KENNY    lamoTRIgine (LaMICtal) tablet 100 mg, Oral, BID    lidocaine (LIDODERM) 5 % patch 1 patch, Topical, Daily    LORazepam (ATIVAN) tablet 0.5 mg, Oral, BID    nicotine (NICODERM CQ) 21 mg/24 hr TD 24 hr patch 1 patch, Transdermal, Daily    oxybutynin (DITROPAN) tablet 5 mg, Oral, BID    pantoprazole (PROTONIX) EC tablet 40 mg, Oral, Daily Before Breakfast    risperiDONE ER (Uzedy) subcutaneous injection 100 mg, Subcutaneous, Q30 Days    thiamine tablet 100 mg, Oral, Daily    Risks/Benefits of Treatment:      Risks, benefits, and possible side effects of medications explained to patient and patient verbalizes understanding and agreement for treatment.    Progress Toward Goals: progressing    Treatment Planning:     All current active medications have been reviewed.  Continue to monitor response to treatment and assess for potential side effects of medications.  Encourage group therapy, milieu therapy and occupational therapy.  Collaboration with medical service for medical comorbidities as indicated.  Behavioral Health checks for safety monitoring.  Estimated Discharge Day: 5/2/2025  Long Stay Certification : Not Applicable    Subjective     Behavior over the last 24 hours: unchanged    Per nursing, Jo-Ann CUMMINGS is cooperative with staff, denies SI/HI, intermittent auditory hallucinations, paranoid delusions.  Redirectable, appropriate in conversation, withdrawn to room last evening, behaviorally appropriate, slept well.    Jo-Ann is calm and cooperative with constricted affect. ACT met with patient recently and reports patient is approaching baseline. Patient continues to report positive effect from Uzedy 100mg BLANCA yesterday, denies any side effects at this time. Notes she is able to think more clearly now.  Does continue reporting chronic paranoid delusions, however appears to have insight into such and can reality test.  Reports significant improvement in auditory hallucinations since admission. Denies suicidal and homicidal ideations.  Denies visual hallucinations. Notes regular bowel movements.  Reports adequate appetite and sleep. She is tolerating Clozaril at this time, and is more forward thinking.  Denies any sedation throughout the day, nor any side effects from her psychiatric regimen.    Sleep: normal  Appetite: fair  Medication side effects: No  ROS: review of systems as noted above in HPI/Subjective report, all other systems are negative    Objective :  Temp:  [97.2 °F (36.2 °C)-97.7 °F (36.5 °C)] 97.2 °F (36.2  "°C)  HR:  [104-113] 113  BP: (114-142)/(79-92) 114/79  Resp:  [16-18] 16  SpO2:  [94 %-95 %] 95 %  O2 Device: None (Room air)    Mental Status Evaluation:    Appearance:  age appropriate, casually dressed, adequate grooming   Behavior:  cooperative, calm   Speech:  normal rate and volume   Mood:  \"A little better\"   Affect:  mood-congruent, constricted   Thought Process:  goal directed, more logical   Thought Content:  paranoid and bizarre delusions that are baseline and chronic, some ruminations   Perceptual Disturbances: denies visual hallucinations when asked, does not appear responding to internal stimuli, notes significant proving auditory hallucinations stating they are less and less intermittent   Risk Potential: Suicidal Ideation -  None at present  Homicidal Ideation -  None  Potential for Aggression - No   Sensorium:  oriented to person, place, and time/date   Memory:  recent and remote memory grossly intact   Consciousness:  alert and awake   Attention/Concentration: attention span and concentration are age appropriate   Insight:  improving and limited   Judgment: limited   Gait/Station: normal gait/station   Motor Activity: no abnormal movements       Lab Results: I have reviewed the following results:  Results from the past 24 hours: No results found for this or any previous visit (from the past 24 hours).    Administrative Statements     Counseling / Coordination of Care:   Patient's progress discussed with staff in treatment team meeting.  Medication changes reviewed with staff in treatment team meeting.  Medication education provided to patient.  Educated on importance of medication and treatment compliance.  Reassurance and supportive therapy provided.    Portions of the record may have been created with voice recognition software. Occasional wrong word or \"sound a like\" substitutions may have occurred due to the inherent limitations of voice recognition software. Read the chart carefully and " recognize, using context, where substitutions have occurred.    Olaf Bazan PA-C 05/02/25

## 2025-05-03 PROCEDURE — 99232 SBSQ HOSP IP/OBS MODERATE 35: CPT

## 2025-05-03 RX ADMIN — LORAZEPAM 0.5 MG: 0.5 TABLET ORAL at 08:12

## 2025-05-03 RX ADMIN — CYANOCOBALAMIN TAB 500 MCG 500 MCG: 500 TAB at 08:12

## 2025-05-03 RX ADMIN — ESCITALOPRAM OXALATE 10 MG: 10 TABLET ORAL at 08:12

## 2025-05-03 RX ADMIN — HYDROXYZINE HYDROCHLORIDE 100 MG: 50 TABLET, FILM COATED ORAL at 23:42

## 2025-05-03 RX ADMIN — OMEGA-3 FATTY ACIDS CAP 1000 MG 1000 MG: 1000 CAP at 08:12

## 2025-05-03 RX ADMIN — BISACODYL 10 MG: 5 TABLET, COATED ORAL at 08:12

## 2025-05-03 RX ADMIN — METHOCARBAMOL 500 MG: 500 TABLET ORAL at 13:22

## 2025-05-03 RX ADMIN — LAMOTRIGINE 100 MG: 100 TABLET ORAL at 17:06

## 2025-05-03 RX ADMIN — FOLIC ACID 1 MG: 1 TABLET ORAL at 08:12

## 2025-05-03 RX ADMIN — BENZTROPINE MESYLATE 1 MG: 1 TABLET ORAL at 10:06

## 2025-05-03 RX ADMIN — NICOTINE 1 PATCH: 21 PATCH, EXTENDED RELEASE TRANSDERMAL at 10:06

## 2025-05-03 RX ADMIN — HYDROCORTISONE: 25 CREAM TOPICAL at 09:36

## 2025-05-03 RX ADMIN — LORAZEPAM 0.5 MG: 0.5 TABLET ORAL at 15:07

## 2025-05-03 RX ADMIN — HYDROXYZINE HYDROCHLORIDE 100 MG: 50 TABLET, FILM COATED ORAL at 17:52

## 2025-05-03 RX ADMIN — LIDOCAINE 1 PATCH: 50 PATCH CUTANEOUS at 10:06

## 2025-05-03 RX ADMIN — THIAMINE HCL TAB 100 MG 100 MG: 100 TAB at 08:12

## 2025-05-03 RX ADMIN — PANTOPRAZOLE SODIUM 40 MG: 40 TABLET, DELAYED RELEASE ORAL at 10:06

## 2025-05-03 RX ADMIN — GUAIFENESIN 600 MG: 600 TABLET ORAL at 20:54

## 2025-05-03 RX ADMIN — BISACODYL 10 MG: 5 TABLET, COATED ORAL at 20:54

## 2025-05-03 RX ADMIN — OXYCODONE HYDROCHLORIDE AND ACETAMINOPHEN 1 TABLET: 5; 325 TABLET ORAL at 17:52

## 2025-05-03 RX ADMIN — OXYBUTYNIN CHLORIDE 5 MG: 5 TABLET ORAL at 17:05

## 2025-05-03 RX ADMIN — CLOZAPINE 450 MG: 100 TABLET ORAL at 17:05

## 2025-05-03 RX ADMIN — GUAIFENESIN 600 MG: 600 TABLET ORAL at 08:12

## 2025-05-03 RX ADMIN — HYDROXYZINE HYDROCHLORIDE 100 MG: 50 TABLET, FILM COATED ORAL at 03:29

## 2025-05-03 RX ADMIN — LAMOTRIGINE 100 MG: 100 TABLET ORAL at 08:12

## 2025-05-03 RX ADMIN — RISPERIDONE 1 MG: 1 TABLET, ORALLY DISINTEGRATING ORAL at 10:05

## 2025-05-03 RX ADMIN — ATORVASTATIN CALCIUM 10 MG: 10 TABLET, FILM COATED ORAL at 17:05

## 2025-05-03 RX ADMIN — OXYCODONE HYDROCHLORIDE AND ACETAMINOPHEN 1 TABLET: 5; 325 TABLET ORAL at 08:12

## 2025-05-03 RX ADMIN — Medication 1000 UNITS: at 08:12

## 2025-05-03 RX ADMIN — OXYBUTYNIN CHLORIDE 5 MG: 5 TABLET ORAL at 08:12

## 2025-05-03 NOTE — PLAN OF CARE
Problem: Alteration in Thoughts and Perception  Goal: Agree to be compliant with medication regime, as prescribed and report medication side effects  Description: Interventions:- Offer appropriate PRN medication and supervise ingestion; conduct AIMS, as needed   Outcome: Progressing     Problem: Risk for Self Injury/Neglect  Goal: Refrain from harming self  Description: Interventions:- Monitor patient closely, per order- Develop a trusting relationship- Supervise medication ingestion, monitor effects and side effects   Interventions:- Monitor patient closely, per order- Develop a trusting relationship- Supervise medication ingestion, monitor effects and side effects   Outcome: Progressing

## 2025-05-03 NOTE — ASSESSMENT & PLAN NOTE
Lexapro 10 mg daily for PTSD symptoms.    Orders from past 72 hours:    escitalopram (LEXAPRO) 10 mg tablet; Take 1 tablet (10 mg total) by mouth daily Do not start before May 2, 2025.

## 2025-05-03 NOTE — NURSING NOTE
Patient withdrawn to room and self this evening. Observed to be sleeping most of the night. Patient had to be woken up for scheduled medications; uninterested in assessment questions. No PRNs given. She is able to make her needs known. Continuous q15 minute rounding and safety checks ongoing.

## 2025-05-03 NOTE — PROGRESS NOTES
Progress Note - Behavioral Health   Jo-Ann Lamb 54 y.o. female MRN: [id number]  Unit/Bed#: [facility] 218-01 Encounter: [id number]    Assessment & Plan   Principal Problem:    Schizoaffective disorder, bipolar type (HCC)  Active Problems:    LYNN (generalized anxiety disorder)    Post-traumatic stress disorder, chronic      Recommended Treatment:     Assessment & Plan  Schizoaffective disorder, bipolar type (HCC)  Clozaril 450 mg after dinner for psychosis and mood symptoms.  AIMS of 0 on today's assessment.  A1c of 5.6 as of 1/24/2025, recommend new A1c for morning labs, lipid panel notable for elevated cholesterol, triglycerides and LDL as of 4/22/2025.  Lipitor 10 mg daily with dinner for cardiometabolic syndrome.  Fish oil 1000 mg daily for cardiometabolic syndrome.  Dulcolax 10 mg twice daily for standing bowel regimen.  Clozapine level of 655 as of 4/26/2025, clozapine level for 5/5/2025.  PARQ for second generation antipsychotics discussed with patient which includes but is not limited to cardiometabolic syndrome, extrapyramidal symptoms, weight gain, akathisia, sedation, orthostatic hypotension, liver and kidney impairment, neuroleptic malignant syndrome, myocarditis, agranulocytosis and decreased white blood cell count, anticholinergic symptoms, hyperprolactinemia, sexual dysfunction, and seizures.  Uzedy subcutaneous injection 100 mg every 30 days for psychosis and mood.  AIMS of 0 on today's assessment.  Repeat A1c as above, lipid panel reviewed.  Most recently administered on 5/1/2025 at 1400, next dose 5/31/2025.  PARQ for second generation antipsychotics discussed with patient which includes but is not limited to cardiometabolic syndrome, extrapyramidal symptoms, weight gain, akathisia, sedation, orthostatic hypotension, liver and kidney impairment, neuroleptic malignant syndrome, myocarditis, agranulocytosis and decreased white blood cell count, anticholinergic symptoms, hyperprolactinemia, sexual  dysfunction, and seizures.  Lamictal 100 mg twice daily for mood stabilization.  Lamotrigine PARQ completed including dizziness, headaches, ataxia, vision problems, somnolence, sleep changes, cognitive difficulties, rash (including Luna-Bam rash), and others, risk of teratogenicity for females.   Lexapro 10 mg daily for mood symptoms.  PARQ completed including serotonin syndrome, SIADH, worsening depression, suicidality, induction of sudheer, GI upset, headaches, activation, sexual side effects, sedation, potential drug interactions, and others.    Orders from past 72 hours:    clozapine (CLOZARIL) 50 MG tablet; Take 9 tablets (450 mg total) by mouth daily after dinner    lamoTRIgine (LaMICtal) 100 mg tablet; Take 1 tablet (100 mg total) by mouth 2 (two) times a day    LORazepam (ATIVAN) 0.5 mg tablet; Take 1 tablet (0.5 mg total) by mouth 2 (two) times a day    risperiDONE ER (Uzedy) 200 MG/0.56ML JUDY subcutaneous injection; Inject 0.28 mL (100 mg total) under the skin every 30 (thirty) days for 1 dose Do not start before May 31, 2025.    Post-traumatic stress disorder, chronic  Lexapro 10 mg daily for PTSD symptoms.    Orders from past 72 hours:    escitalopram (LEXAPRO) 10 mg tablet; Take 1 tablet (10 mg total) by mouth daily Do not start before May 2, 2025.    LYNN (generalized anxiety disorder)  Lexapro 10 mg daily for anxiety symptoms.  Ativan 0.5 mg twice daily for severe anxiety and panic attacks.  PDMP reviewed.  Discussed with patient the risks of physical and psychological dependence, withdrawal, sedation, respiratory depression, impairment in judgment and motor function, depression, mood changes, and others discussed. Patient was also informed of risks of being on or starting opioid medications due to drug interactions and potential for serious respiratory depression and death.     Orders from past 72 hours:    LORazepam (ATIVAN) 0.5 mg tablet; Take 1 tablet (0.5 mg total) by mouth 2 (two) times a  day    escitalopram (LEXAPRO) 10 mg tablet; Take 1 tablet (10 mg total) by mouth daily Do not start before May 2, 2025.    Continue with group therapy, milieu therapy and occupational therapy.    Continue frequent safety checks and vitals per unit protocol.  Continue with SLIM medical management as indicated  Continue coordinating with case management regarding disposition    Legal Status: 201  Disposition: To be determined, coordinating with case management    Case discussed with treatment team.  Risks, benefits and possible side effects of Medications: Risks, benefits, and possible side effects of medications have been explained to the patient, who verbalizes understanding    ------------------------------------------------------------    Subjective: Patient's chart was reviewed, and patient's progress and plan was discussed with treatment team. Per nursing report, Jo-Ann CUMMINGS has been visible, reporting feeling more calm and ultimately cooperative on the unit and compliant with medications. Patient has remained in behavioral control for the last 24 hours. Last night patient was documented to have slept throughout the night.    Jo-Ann CUMMINGS was evaluated this morning for continuity of care. On examination, Jo-Ann CUMMINGS is reporting significant improvements in psychosis and mood symptoms since starting long-acting injectable and is hopeful for the future but states she is experiencing slight muscle stiffness today and was prompted to ask nursing for as needed Cogentin/anticholinergic medication. She demonstrates slightly constricted affect, linear and goal oriented thought process and does not appear to be responding to internal stimuli. She denies suicidal ideation, homicidal ideation, auditory hallucinations, and visual hallucinations.    VS: Reviewed,  BMI of 27.28, otherwise within normal limits    Progress Toward Goals: Continued improvement    Psychiatric Review of Systems:  Behavior over the last 24 hours: improved  Sleep:  "improving  Appetite: adequate  Medication side effects: none verbalized  Medical ROS: Complete review of systems is negative except as noted above.    Vital signs in last 24 hours:  Temp:  [97.3 °F (36.3 °C)] 97.3 °F (36.3 °C)  HR:  [97] 97  BP: (135-148)/(95-98) 135/95  Resp:  [16-18] 18  SpO2:  [90 %-93 %] 90 %  O2 Device: None (Room air)    Mental Status Exam:    Appearance:  alert, good eye contact, appears stated age, casually dressed, and appropriate grooming and hygiene   Behavior:  calm, cooperative, and sitting comfortably   Speech:  spontaneous, clear, normal rate, normal volume, and coherent   Mood:  \"Good\"   Affect:  constricted, mood-congruent   Thought Process:  goal directed, linear   Associations: intact associations   Thought Content:  no verbalized delusions or overt paranoia   Perceptual Disturbances: no reported hallucinations and does not appear to be responding to internal stimuli at this time   Risk Potential: Suicidal ideation - None at present  Homicidal ideation - None at present  Potential for aggression - Not at present   Sensorium:  oriented to person, place, time/date, and situation   Memory:  recent and remote memory grossly intact   Consciousness:  alert and awake   Attention/Concentration: attention span and concentration are age appropriate   Insight:  improving   Judgment: improving   Gait/Station: normal gait/station, normal balance   Motor Activity: no abnormal movements     Current Medications:  Current Facility-Administered Medications   Medication Dose Route Frequency Provider Last Rate    acetaminophen  650 mg Oral Q6H PRN Taylor Castro MD      acetaminophen  650 mg Oral Q4H PRN Taylor Castro MD      acetaminophen  975 mg Oral Q6H PRN Taylor Castro MD      albuterol  2 puff Inhalation Q4H PRN Jose Steel MD      aluminum-magnesium hydroxide-simethicone  30 mL Oral Q4H PRN Taylor Castro MD      atorvastatin  10 mg Oral Daily With Dinner Zandra Osorio " MARTINE Barclay      benztropine  1 mg Intramuscular Q4H PRN Max 6/day Taylor Castro MD      benztropine  1 mg Oral Q4H PRN Max 6/day Taylor Castro MD      bisacodyl  10 mg Oral BID [place] Damián, MD      bisacodyl  10 mg Rectal Daily PRN Taylor Castro MD      Cholecalciferol  1,000 Units Oral Daily [place] Damián, MD      cloZAPine  450 mg Oral After Dinner JARED Bolton      vitamin B-12  500 mcg Oral Daily [place] Damián, MD      hydrOXYzine HCL  50 mg Oral Q6H PRN Max 4/day Taylor Castro MD      Or    diphenhydrAMINE  50 mg Intramuscular Q6H PRN Taylor Castro MD      diphenhydramine, lidocaine, Al/Mg hydroxide, simethicone  10 mL Swish & Swallow Q4H PRN [place] Damián, MD      escitalopram  10 mg Oral Daily Leny Cardenas MD      fish oil  1,000 mg Oral Daily [place] Damián, MD      Fluticasone Furoate-Vilanterol  1 puff Inhalation Daily [place] Damián, MD      folic acid  1 mg Oral Daily [place] Damián, MD      guaiFENesin  600 mg Oral Q12H KENNY [place] Damián, MD      hydrocortisone   Topical 4x Daily PRN Jose Steel MD      hydrOXYzine HCL  100 mg Oral Q6H PRN Max 4/day Taylor Castro MD      Or    LORazepam  2 mg Intramuscular Q6H PRN Taylor Castro MD      hydrOXYzine HCL  25 mg Oral Q6H PRN Max 4/day Taylor Castro MD      lamoTRIgine  100 mg Oral BID Leny Cardenas MD      lidocaine  1 patch Topical Daily [place] Damián, MD      LORazepam  0.5 mg Oral BID Leny Cardenas MD      methocarbamol  500 mg Oral Q6H PRN Zandra Barclay PA-C      nicotine  1 patch Transdermal Daily [place] Damián, MD      nicotine polacrilex  4 mg Oral Q2H PRN Taylor Castro MD      ondansetron  4 mg Oral Q6H PRN Jose Steel MD      oxybutynin  5 mg Oral BID [place] Damián, MD      oxyCODONE-acetaminophen  1 tablet Oral Q8H PRN Zandra Barclay PA-C      pantoprazole  40 mg Oral Daily Before Breakfast [place] Damián, MD      phenol  1 spray Mouth/Throat Q2H PRN Zandra Barclay PA-C       polyethylene glycol  17 g Oral Daily PRN Taylor Castro MD      propranolol  10 mg Oral Q8H PRN Taylor Castro MD      risperiDONE  0.25 mg Oral Q6H PRN Max 4/day Bri Mazariegos, RENATANP      risperiDONE  0.5 mg Oral Q6H PRN Max 4/day Bri Mazariegos, CRNP      risperiDONE  1 mg Oral Q8H PRN Bri Mazariegos, CRNP      risperiDONE ER  100 mg Subcutaneous Q30 Days Bri Mazariegos, JARED      senna-docusate sodium  1 tablet Oral Daily PRN Taylor Castro MD      Thiamine Mononitrate  100 mg Oral Daily Jose Steel MD      traZODone  50 mg Oral HS PRN Taylor Castro MD         Behavioral Health Medications: all current active meds have been reviewed. Changes as in plan section above.    Laboratory results:  I have personally reviewed all pertinent laboratory/tests results.   No results found for this or any previous visit (from the past 48 hours).     This note has been constructed using a voice recognition system. There may be translation, syntax, or grammatical errors. If you have any questions, please contact the dictating author.    Dwight Drake, DO  Psychiatry Residency, PGY-III

## 2025-05-03 NOTE — PROGRESS NOTES
"Progress Note - Jo-Ann Lamb 54 y.o. female MRN: [id number]    Unit/Bed#: [facility] 218-01 Encounter: [id number]        Subjective:   Patient seen and examined at bedside after reviewing the chart and discussing the case with the caring staff.      Patient examined at bedside.  Patient denies any acute symptoms.     Patient is being discharged Monday, 5/5/25.    Physical Exam   Vitals: Blood pressure 135/95, pulse 97, temperature (!) 97.3 °F (36.3 °C), temperature source Temporal, resp. rate 18, height 5' 1\" (1.549 m), weight 65.5 kg (144 lb 6.4 oz), last menstrual period 01/01/2020, SpO2 90%, not currently breastfeeding.,Body mass index is 27.28 kg/m².  Constitutional: Patient in no acute distress.  HEENT: PERR, EOMI, MMM.  Cardiovascular: Normal rate and regular rhythm.    Pulmonary/Chest: Effort normal and breath sounds normal.   Abdomen: Soft, + BS, NT.    Assessment/Plan:  Jo-Ann Lamb is a(n) 54 y.o. year old female with Schizoaffective DO     HTN.  No longer taking Losartan 50 mg daily.  Will continue to monitor.   HLD.  Continue fish oil daily.  Restart atorvastatin 10 mg daily 4/24.     Asthma.  Patient is on Breo Ellipta daily (Advair nonform), albuterol inhaler as needed.   Allergic rhinitis.  Stable.  Declines previous Flonase -believes it is causing sore throat.   GERD.  Patient on Protonix 40 mg daily.   Urge incontinence.  Continue oxybutynin 5 mg twice daily.   Constipation/hemorrhoids.  Increase Dulcolax 10 mg twice daily 4/29/2025.  Miralax or Senokot as needed.  Apply Anusol-HC as needed.    Chronic back w/ radiculopathy.  Patient follows with pain management.  Patient on Percocet 5-325 mg q8h as needed for breakthrough pain.  PDMP reviewed.  Patient also receiving epidural injections to lower back outpt.  Tylenol as needed for mild-moderate pain.  Patient may get Lidoderm patch over the back.  Robaxin as needed for muscle spasms.   Tobacco abuse.  NRT.   Vitamin D deficiency.  Continue vitamin D3 1000 " units daily.  Recheck levels.   Vitamin B12 deficiency.  Continue B12 500 mcg daily.  Recheck levels.   Sore throat/mouth pain.  Cough drops or Magic mouthwash as needed per Dr. Steel.  Chloraseptic spray as needed 4/25.   Chest congestion/cough.  Patient treated with azithromycin for mycoplasma pneumonia 4/10.  Start Augmentin 875-125 mg twice daily x 7 days.  Patient with frequent infections -needs to follow up outpatient.     The patient was discussed with Dr. Steel and he is in agreement with the above note.

## 2025-05-03 NOTE — ASSESSMENT & PLAN NOTE
Clozaril 450 mg after dinner for psychosis and mood symptoms.  AIMS of 0 on today's assessment.  A1c of 5.6 as of 1/24/2025, recommend new A1c for morning labs, lipid panel notable for elevated cholesterol, triglycerides and LDL as of 4/22/2025.  Lipitor 10 mg daily with dinner for cardiometabolic syndrome.  Fish oil 1000 mg daily for cardiometabolic syndrome.  Dulcolax 10 mg twice daily for standing bowel regimen.  Clozapine level of 655 as of 4/26/2025, clozapine level for 5/5/2025.  PARQ for second generation antipsychotics discussed with patient which includes but is not limited to cardiometabolic syndrome, extrapyramidal symptoms, weight gain, akathisia, sedation, orthostatic hypotension, liver and kidney impairment, neuroleptic malignant syndrome, myocarditis, agranulocytosis and decreased white blood cell count, anticholinergic symptoms, hyperprolactinemia, sexual dysfunction, and seizures.  Uzedy subcutaneous injection 100 mg every 30 days for psychosis and mood.  AIMS of 0 on today's assessment.  Repeat A1c as above, lipid panel reviewed.  Most recently administered on 5/1/2025 at 1400, next dose 5/31/2025.  PARQ for second generation antipsychotics discussed with patient which includes but is not limited to cardiometabolic syndrome, extrapyramidal symptoms, weight gain, akathisia, sedation, orthostatic hypotension, liver and kidney impairment, neuroleptic malignant syndrome, myocarditis, agranulocytosis and decreased white blood cell count, anticholinergic symptoms, hyperprolactinemia, sexual dysfunction, and seizures.  Lamictal 100 mg twice daily for mood stabilization.  Lamotrigine PARQ completed including dizziness, headaches, ataxia, vision problems, somnolence, sleep changes, cognitive difficulties, rash (including Luna-Bam rash), and others, risk of teratogenicity for females.   Lexapro 10 mg daily for mood symptoms.  PARQ completed including serotonin syndrome, SIADH, worsening depression,  suicidality, induction of sudheer, GI upset, headaches, activation, sexual side effects, sedation, potential drug interactions, and others.    Orders from past 72 hours:    clozapine (CLOZARIL) 50 MG tablet; Take 9 tablets (450 mg total) by mouth daily after dinner    lamoTRIgine (LaMICtal) 100 mg tablet; Take 1 tablet (100 mg total) by mouth 2 (two) times a day    LORazepam (ATIVAN) 0.5 mg tablet; Take 1 tablet (0.5 mg total) by mouth 2 (two) times a day    risperiDONE ER (Uzedy) 200 MG/0.56ML JUDY subcutaneous injection; Inject 0.28 mL (100 mg total) under the skin every 30 (thirty) days for 1 dose Do not start before May 31, 2025.

## 2025-05-03 NOTE — ASSESSMENT & PLAN NOTE
Lexapro 10 mg daily for anxiety symptoms.  Ativan 0.5 mg twice daily for severe anxiety and panic attacks.  PDMP reviewed.  Discussed with patient the risks of physical and psychological dependence, withdrawal, sedation, respiratory depression, impairment in judgment and motor function, depression, mood changes, and others discussed. Patient was also informed of risks of being on or starting opioid medications due to drug interactions and potential for serious respiratory depression and death.     Orders from past 72 hours:    LORazepam (ATIVAN) 0.5 mg tablet; Take 1 tablet (0.5 mg total) by mouth 2 (two) times a day    escitalopram (LEXAPRO) 10 mg tablet; Take 1 tablet (10 mg total) by mouth daily Do not start before May 2, 2025.

## 2025-05-03 NOTE — NURSING NOTE
Patient slept for majority of the night; periods of restlessness. Non labored breathing observed. No signs or symptoms of distress. Continuous q15 minute rounding and safety checks ongoing.

## 2025-05-03 NOTE — TREATMENT TEAM
05/03/25 0329   Martines Anxiety Scale   Anxious Mood 3   Tension 3   Fears 3   Insomnia 2   Intellectual 2   Depressed Mood 2   Somatic Complaints: Muscular 2   Somatic Complaints: Sensory 1   Cardiovascular Symptoms 1   Respiratory Symptoms 1   Gastrointestinal Symptoms 0   Genitourinary Symptoms 0   Autonomic Symptoms 2   Behavior at Interview 3   Martines Anxiety Score 25     Patient approached nurses station endorsing racing thoughts and anxiety causing her to be unable to sleep. Patient appearing anxious. Atarax 100 mg PO given. Will continue to monitor and reassess.

## 2025-05-04 PROCEDURE — 99232 SBSQ HOSP IP/OBS MODERATE 35: CPT

## 2025-05-04 RX ORDER — METHOCARBAMOL 500 MG/1
500 TABLET, FILM COATED ORAL EVERY 6 HOURS PRN
Qty: 15 TABLET | Refills: 0 | Status: SHIPPED | OUTPATIENT
Start: 2025-05-04

## 2025-05-04 RX ADMIN — METHOCARBAMOL 500 MG: 500 TABLET ORAL at 14:15

## 2025-05-04 RX ADMIN — FOLIC ACID 1 MG: 1 TABLET ORAL at 07:56

## 2025-05-04 RX ADMIN — NICOTINE 1 PATCH: 21 PATCH, EXTENDED RELEASE TRANSDERMAL at 09:09

## 2025-05-04 RX ADMIN — LIDOCAINE 1 PATCH: 50 PATCH CUTANEOUS at 09:09

## 2025-05-04 RX ADMIN — LORAZEPAM 0.5 MG: 0.5 TABLET ORAL at 15:03

## 2025-05-04 RX ADMIN — CYANOCOBALAMIN TAB 500 MCG 500 MCG: 500 TAB at 07:53

## 2025-05-04 RX ADMIN — PANTOPRAZOLE SODIUM 40 MG: 40 TABLET, DELAYED RELEASE ORAL at 07:52

## 2025-05-04 RX ADMIN — LAMOTRIGINE 100 MG: 100 TABLET ORAL at 07:56

## 2025-05-04 RX ADMIN — OXYBUTYNIN CHLORIDE 5 MG: 5 TABLET ORAL at 07:58

## 2025-05-04 RX ADMIN — BISACODYL 10 MG: 5 TABLET, COATED ORAL at 07:55

## 2025-05-04 RX ADMIN — HYDROXYZINE HYDROCHLORIDE 100 MG: 50 TABLET, FILM COATED ORAL at 16:08

## 2025-05-04 RX ADMIN — OXYBUTYNIN CHLORIDE 5 MG: 5 TABLET ORAL at 17:07

## 2025-05-04 RX ADMIN — ATORVASTATIN CALCIUM 10 MG: 10 TABLET, FILM COATED ORAL at 16:08

## 2025-05-04 RX ADMIN — ESCITALOPRAM OXALATE 10 MG: 10 TABLET ORAL at 07:53

## 2025-05-04 RX ADMIN — BISACODYL 10 MG: 5 TABLET, COATED ORAL at 20:38

## 2025-05-04 RX ADMIN — RISPERIDONE 1 MG: 1 TABLET, ORALLY DISINTEGRATING ORAL at 16:09

## 2025-05-04 RX ADMIN — CLOZAPINE 450 MG: 100 TABLET ORAL at 17:07

## 2025-05-04 RX ADMIN — LORAZEPAM 0.5 MG: 0.5 TABLET ORAL at 07:54

## 2025-05-04 RX ADMIN — GUAIFENESIN 600 MG: 600 TABLET ORAL at 20:38

## 2025-05-04 RX ADMIN — OMEGA-3 FATTY ACIDS CAP 1000 MG 1000 MG: 1000 CAP at 07:53

## 2025-05-04 RX ADMIN — OXYCODONE HYDROCHLORIDE AND ACETAMINOPHEN 1 TABLET: 5; 325 TABLET ORAL at 16:08

## 2025-05-04 RX ADMIN — GUAIFENESIN 600 MG: 600 TABLET ORAL at 07:52

## 2025-05-04 RX ADMIN — Medication 1000 UNITS: at 07:56

## 2025-05-04 RX ADMIN — LAMOTRIGINE 100 MG: 100 TABLET ORAL at 17:07

## 2025-05-04 RX ADMIN — THIAMINE HCL TAB 100 MG 100 MG: 100 TAB at 07:52

## 2025-05-04 RX ADMIN — OXYCODONE HYDROCHLORIDE AND ACETAMINOPHEN 1 TABLET: 5; 325 TABLET ORAL at 07:52

## 2025-05-04 NOTE — PROGRESS NOTES
Progress Note - Behavioral Health   Jo-Ann Lamb 54 y.o. female MRN: [id number]  Unit/Bed#: [facility] 218-01 Encounter: [id number]    Assessment & Plan   Principal Problem:    Schizoaffective disorder, bipolar type (HCC)  Active Problems:    LYNN (generalized anxiety disorder)    Post-traumatic stress disorder, chronic      Recommended Treatment:     Assessment & Plan  Schizoaffective disorder, bipolar type (HCC)  Clozaril 450 mg after dinner for psychosis and mood symptoms.  AIMS of 0 on today's assessment.  A1c of 5.6 as of 1/24/2025, recommend new A1c for morning labs, lipid panel notable for elevated cholesterol, triglycerides and LDL as of 4/22/2025.  Lipitor 10 mg daily with dinner for cardiometabolic syndrome.  Fish oil 1000 mg daily for cardiometabolic syndrome.  Dulcolax 10 mg twice daily for standing bowel regimen.  Clozapine level of 655 as of 4/26/2025, clozapine level for 5/5/2025.  PARQ for second generation antipsychotics discussed with patient which includes but is not limited to cardiometabolic syndrome, extrapyramidal symptoms, weight gain, akathisia, sedation, orthostatic hypotension, liver and kidney impairment, neuroleptic malignant syndrome, myocarditis, agranulocytosis and decreased white blood cell count, anticholinergic symptoms, hyperprolactinemia, sexual dysfunction, and seizures.  Uzedy subcutaneous injection 100 mg every 30 days for psychosis and mood.  AIMS of 0 on today's assessment.  Repeat A1c as above, lipid panel reviewed.  Most recently administered on 5/1/2025 at 1400, next dose 5/31/2025.  PARQ for second generation antipsychotics discussed with patient which includes but is not limited to cardiometabolic syndrome, extrapyramidal symptoms, weight gain, akathisia, sedation, orthostatic hypotension, liver and kidney impairment, neuroleptic malignant syndrome, myocarditis, agranulocytosis and decreased white blood cell count, anticholinergic symptoms, hyperprolactinemia, sexual  dysfunction, and seizures.  Lamictal 100 mg twice daily for mood stabilization.  Lamotrigine PARQ completed including dizziness, headaches, ataxia, vision problems, somnolence, sleep changes, cognitive difficulties, rash (including Luna-Bam rash), and others, risk of teratogenicity for females.   Lexapro 10 mg daily for mood symptoms.  PARQ completed including serotonin syndrome, SIADH, worsening depression, suicidality, induction of sudheer, GI upset, headaches, activation, sexual side effects, sedation, potential drug interactions, and others.    Orders from past 72 hours:    clozapine (CLOZARIL) 50 MG tablet; Take 9 tablets (450 mg total) by mouth daily after dinner    lamoTRIgine (LaMICtal) 100 mg tablet; Take 1 tablet (100 mg total) by mouth 2 (two) times a day    LORazepam (ATIVAN) 0.5 mg tablet; Take 1 tablet (0.5 mg total) by mouth 2 (two) times a day    risperiDONE ER (Uzedy) 200 MG/0.56ML JUDY subcutaneous injection; Inject 0.28 mL (100 mg total) under the skin every 30 (thirty) days for 1 dose Do not start before May 31, 2025.    Post-traumatic stress disorder, chronic  Lexapro 10 mg daily for PTSD symptoms.    Orders from past 72 hours:    escitalopram (LEXAPRO) 10 mg tablet; Take 1 tablet (10 mg total) by mouth daily Do not start before May 2, 2025.    LYNN (generalized anxiety disorder)  Lexapro 10 mg daily for anxiety symptoms.  Ativan 0.5 mg twice daily for severe anxiety and panic attacks.  PDMP reviewed.  Discussed with patient the risks of physical and psychological dependence, withdrawal, sedation, respiratory depression, impairment in judgment and motor function, depression, mood changes, and others discussed. Patient was also informed of risks of being on or starting opioid medications due to drug interactions and potential for serious respiratory depression and death.     Orders from past 72 hours:    LORazepam (ATIVAN) 0.5 mg tablet; Take 1 tablet (0.5 mg total) by mouth 2 (two) times a  day    escitalopram (LEXAPRO) 10 mg tablet; Take 1 tablet (10 mg total) by mouth daily Do not start before May 2, 2025.    Continue with group therapy, milieu therapy and occupational therapy.    Continue frequent safety checks and vitals per unit protocol.  Continue with SLIM medical management as indicated  Continue coordinating with case management regarding disposition    Legal Status: 201  Disposition: To be determined, coordinating with case management    Case discussed with treatment team.  Risks, benefits and possible side effects of Medications: Risks, benefits, and possible side effects of medications have been explained to the patient, who verbalizes understanding    ------------------------------------------------------------    Subjective: Patient's chart was reviewed, and patient's progress and plan was discussed with treatment team. Per nursing report, Jo-Ann CUMMINGS has been pleasant, calm, cooperative on the unit and compliant with medications. Patient has remained in behavioral control for the last 24 hours. Last night patient was documented to have slept throughout the night.    Jo-Ann CUMMINGS was evaluated this morning for continuity of care. On examination, Jo-Ann CUMMINGS is reporting less muscle stiffness and feeling hopeful for discharge and appears to be attending ADLs appropriately with no acute concerns. She denies adverse effects from medications. She denies suicidal ideation, homicidal ideation, auditory hallucinations, and visual hallucinations.  She demonstrates generally improved insight and judgment with more organized and logical thought process and appears less internally preoccupied.    VS: Reviewed,  BMI of 27.78, otherwise within normal limits    Progress Toward Goals: Gradual improvement    Psychiatric Review of Systems:  Behavior over the last 24 hours: improved  Sleep: normal  Appetite: adequate  Medication side effects: none verbalized  Medical ROS: Complete review of systems is negative except as  "noted above.    Vital signs in last 24 hours:  Temp:  [97.7 °F (36.5 °C)-97.8 °F (36.6 °C)] 97.8 °F (36.6 °C)  HR:  [90-94] 94  BP: (127-132)/(82-88) 132/82  Resp:  [18] 18  SpO2:  [95 %] 95 %  O2 Device: None (Room air)    Mental Status Exam:    Appearance:  alert, good eye contact, appears stated age, casually dressed, and appropriate grooming and hygiene   Behavior:  calm and cooperative   Speech:  spontaneous, clear, normal rate, normal volume, and coherent   Mood:  \"Good\"   Affect:  Less constricted   Thought Process:  More organized, logical   Associations: intact associations   Thought Content:  no verbalized delusions or overt paranoia   Perceptual Disturbances: no reported hallucinations and does not appear to be responding to internal stimuli at this time   Risk Potential: Suicidal ideation - None at present  Homicidal ideation - None at present  Potential for aggression - Not at present   Sensorium:  oriented to person, place, time/date, and situation   Memory:  recent and remote memory grossly intact   Consciousness:  alert and awake   Attention/Concentration: attention span and concentration are age appropriate   Insight:  improving   Judgment: improving   Gait/Station: normal gait/station, normal balance   Motor Activity: no abnormal movements     Current Medications:  Current Facility-Administered Medications   Medication Dose Route Frequency Provider Last Rate    acetaminophen  650 mg Oral Q6H PRN Taylor Castro MD      acetaminophen  650 mg Oral Q4H PRN Taylor Castro MD      acetaminophen  975 mg Oral Q6H PRN Taylor Castro MD      albuterol  2 puff Inhalation Q4H PRN Jose Steel MD      aluminum-magnesium hydroxide-simethicone  30 mL Oral Q4H PRN Taylor Castro MD      atorvastatin  10 mg Oral Daily With Dinner Zandra Barclay PA-C      benztropine  1 mg Intramuscular Q4H PRN Max 6/day Taylor Castro MD      benztropine  1 mg Oral Q4H PRN Max 6/day Taylor Castro MD   "    bisacodyl  10 mg Oral BID Afghan MD Damián      bisacodyl  10 mg Rectal Daily PRN Taylor Castro MD      Cholecalciferol  1,000 Units Oral Daily Afghan Damián, MD      cloZAPine  450 mg Oral After Dinner JARED Bolton      vitamin B-12  500 mcg Oral Daily Afghan Damián, MD      hydrOXYzine HCL  50 mg Oral Q6H PRN Max 4/day Taylor Castro MD      Or    diphenhydrAMINE  50 mg Intramuscular Q6H PRN Taylor Castro MD      diphenhydramine, lidocaine, Al/Mg hydroxide, simethicone  10 mL Swish & Swallow Q4H PRN Afghan Damián, MD      escitalopram  10 mg Oral Daily Leny Cardenas MD      fish oil  1,000 mg Oral Daily Afghan Damián, MD      Fluticasone Furoate-Vilanterol  1 puff Inhalation Daily Afghan Damián, MD      folic acid  1 mg Oral Daily Afghan Damián, MD      guaiFENesin  600 mg Oral Q12H KENNY Jose Solised, MD      hydrocortisone   Topical 4x Daily PRN Afghan Damián, MD      hydrOXYzine HCL  100 mg Oral Q6H PRN Max 4/day Taylor Castro MD      Or    LORazepam  2 mg Intramuscular Q6H PRN Taylor Castro MD      hydrOXYzine HCL  25 mg Oral Q6H PRN Max 4/day Taylor Castro MD      lamoTRIgine  100 mg Oral BID Leny Cardenas MD      lidocaine  1 patch Topical Daily Afghan Damián, MD      LORazepam  0.5 mg Oral BID Leny Cardenas MD      methocarbamol  500 mg Oral Q6H PRN Zandra Barclay PA-C      nicotine  1 patch Transdermal Daily Afghan Damián, MD      nicotine polacrilex  4 mg Oral Q2H PRN Taylor Castro MD      ondansetron  4 mg Oral Q6H PRN Jose Steel MD      oxybutynin  5 mg Oral BID Jose Solised, MD      oxyCODONE-acetaminophen  1 tablet Oral Q8H PRN Zandra Barclay PA-C      pantoprazole  40 mg Oral Daily Before Breakfast Jose Steel MD      phenol  1 spray Mouth/Throat Q2H PRN Zandra Barclay PA-C      polyethylene glycol  17 g Oral Daily PRN Taylor Castro MD      propranolol  10 mg Oral Q8H PRN Taylor Castro MD      risperiDONE  0.25 mg Oral Q6H PRN Max  4/day Bri NUÑEZ Yair, CRNP      risperiDONE  0.5 mg Oral Q6H PRN Max 4/day Bri M Medei, CRNP      risperiDONE  1 mg Oral Q8H PRN Bri NUÑEZ Medei, CRNP      risperiDONE ER  100 mg Subcutaneous Q30 Days Bri NUÑEZ Avilaei, CRNP      senna-docusate sodium  1 tablet Oral Daily PRN Taylor Castro MD      Thiamine Mononitrate  100 mg Oral Daily Jose Steel MD      traZODone  50 mg Oral HS PRN Taylor Castro MD         Behavioral Health Medications: all current active meds have been reviewed. Changes as in plan section above.    Laboratory results:  I have personally reviewed all pertinent laboratory/tests results.   No results found for this or any previous visit (from the past 48 hours).     This note has been constructed using a voice recognition system. There may be translation, syntax, or grammatical errors. If you have any questions, please contact the dictating author.    Dwight Drake, DO  Psychiatry Residency, PGY-III

## 2025-05-04 NOTE — TREATMENT TEAM
"   05/04/25 1608   Martines Anxiety Scale   Anxious Mood 4   Tension 4   Fears 4   Insomnia 0   Intellectual 3   Depressed Mood 3   Somatic Complaints: Muscular 2   Somatic Complaints: Sensory 2   Cardiovascular Symptoms 1   Respiratory Symptoms 1   Gastrointestinal Symptoms 0   Genitourinary Symptoms 0   Autonomic Symptoms 1   Behavior at Interview 3   Martines Anxiety Score 28   Broset Violence Checklist   Assessment type Shift   Irritability 1   Confusion 1   Boisterousness 1   Threatening physical violence 0   Verbal threats 0   Violence 0   Broset score 3     Patient c/o feeling anxious and appears agitated states due to the people bothering her and who are after her, patient states her head feels\"sick\" administered 100mg atarax po and risperdal 1mg po will monitor for effectiveness   "

## 2025-05-04 NOTE — PROGRESS NOTES
"Progress Note - Jo-Ann Lamb 54 y.o. female MRN: [id number]    Unit/Bed#: [facility] 218-01 Encounter: [id number]        Subjective:   Patient seen and examined at bedside after reviewing the chart and discussing the case with the caring staff.      Patient examined at bedside.  Patient denies any acute symptoms.     Patient is being discharged Monday, 5/5/25.    Physical Exam   Vitals: Blood pressure 132/82, pulse 94, temperature 97.8 °F (36.6 °C), temperature source Temporal, resp. rate 18, height 5' 1\" (1.549 m), weight 65.5 kg (144 lb 6.4 oz), last menstrual period 01/01/2020, SpO2 95%, not currently breastfeeding.,Body mass index is 27.28 kg/m².  Constitutional: Patient in no acute distress.  HEENT: PERR, EOMI, MMM.  Cardiovascular: Normal rate and regular rhythm.    Pulmonary/Chest: Effort normal and breath sounds normal.   Abdomen: Soft, + BS, NT.    Assessment/Plan:  Jo-Ann Lamb is a(n) 54 y.o. year old female with Schizoaffective DO     Medical Clearance: Patient is medically cleared for discharge. All prescriptions have been sent to pharmacy.     HTN.  No longer taking Losartan 50 mg daily.  Will continue to monitor.   HLD.  Continue fish oil daily.  Restart atorvastatin 10 mg daily 4/24.     Asthma.  Patient is on Breo Ellipta daily (Advair nonform), albuterol inhaler as needed.   Allergic rhinitis.  Stable.  Declines previous Flonase -believes it is causing sore throat.   GERD.  Patient on Protonix 40 mg daily.   Urge incontinence.  Continue oxybutynin 5 mg twice daily.   Constipation/hemorrhoids.  Increase Dulcolax 10 mg twice daily 4/29/2025.  Miralax or Senokot as needed.  Apply Anusol-HC as needed.    Chronic back w/ radiculopathy.  Patient follows with pain management.  Patient on Percocet 5-325 mg q8h as needed for breakthrough pain.  PDMP reviewed.  Patient also receiving epidural injections to lower back outpt.  Tylenol as needed for mild-moderate pain.  Patient may get Lidoderm patch over the back.  " Robaxin as needed for muscle spasms.   Tobacco abuse.  NRT.   Vitamin D deficiency.  Continue vitamin D3 1000 units daily.  Recheck levels.   Vitamin B12 deficiency.  Continue B12 500 mcg daily.  Recheck levels.   Sore throat/mouth pain.  Cough drops or Magic mouthwash as needed per Dr. Steel.  Chloraseptic spray as needed 4/25.   Chest congestion/cough.  Patient treated with azithromycin for mycoplasma pneumonia 4/10.  Start Augmentin 875-125 mg twice daily x 7 days.  Patient with frequent infections -needs to follow up outpatient.     The patient was discussed with Dr. Steel and he is in agreement with the above note.

## 2025-05-04 NOTE — NURSING NOTE
Patient observed withdrawn to her room for most of the evening. She is slightly dismissive during assessment due to having to be woken up for it. She denies overt anxiety and depression at this time, she states she is preparing to be discharged on Monday. She denies SI/HI and hallucinations at the time of assessment. She makes her needs known. Q15 minute checks ongoing.

## 2025-05-04 NOTE — NURSING NOTE
Patient compliant with meds and meals. Patient pleasant and cooperative, able to make needs known, labile at time, states to cont to have voices and has delusions of people still after her. Patient able to redirect and reality test. Patient social with select peers and visible at times. Q 15 min behavioral and safety checks in place.

## 2025-05-04 NOTE — PLAN OF CARE
Problem: Alteration in Thoughts and Perception  Goal: Verbalize thoughts and feelings  Description: Interventions:- Promote a nonjudgmental and trusting relationship with the patient through active listening and therapeutic communication- Assess patient's level of functioning, behavior and potential for risk- Engage patient in 1 on 1 interactions- Encourage patient to express fears, feelings, frustrations, and discuss symptoms  - [place] patient to reality, help patient recognize reality-based thinking - Administer medications as ordered and assess for potential side effects- Provide the patient education related to the signs and symptoms of the illness and desired effects of prescribed medications  Interventions:- Assess and re-assess patient's lethality and potential for self-injury- Engage patient in 1:1 interactions, daily, for a minimum of 15 minutes- Encourage patient to express feelings, fears, frustrations, hopes- Establish rapport/trust with patient   Interventions:- Assess and re-assess patient's level of risk - Engage patient in 1:1 interactions, daily, for a minimum of 15 minutes - Encourage patient to express feelings, fears, frustrations, hopes   Interventions:- Assess and re-assess patient's level of risk, every waking shift- Engage patient in 1:1 interactions, daily, for a minimum of 15 minutes - Allow patient to express feelings and frustrations in a safe and non-threatening manner - Establish rapport/trust with patient   Outcome: Progressing  Goal: Agree to be compliant with medication regime, as prescribed and report medication side effects  Description: Interventions:- Offer appropriate PRN medication and supervise ingestion; conduct AIMS, as needed   Outcome: Progressing

## 2025-05-04 NOTE — NURSING NOTE
"Per patient prior meds given not effective states her head is still \"fucked up\". Patient still appears agitated, withdrawn to room after meal.   "

## 2025-05-04 NOTE — TREATMENT TEAM
05/03/25 2342   [place] Anxiety Scale   Anxious Mood 4   Tension 4   Fears 3   Insomnia 1   Intellectual 1   Depressed Mood 2   Somatic Complaints: Muscular 1   Somatic Complaints: Sensory 1   Cardiovascular Symptoms 2   Respiratory Symptoms 0   Gastrointestinal Symptoms 0   Genitourinary Symptoms 0   Autonomic Symptoms 2   Behavior at Interview 4   [place] Anxiety Score 25     Patient administered prn 100 mg atarax for severe anxiety related to upcoming discharge on Monday. Medication was effective. Patient now appears asleep. Q15 minute checks ongoing.

## 2025-05-05 ENCOUNTER — TELEPHONE (OUTPATIENT)
Age: 55
End: 2025-05-05

## 2025-05-05 VITALS
WEIGHT: 147 LBS | RESPIRATION RATE: 18 BRPM | HEIGHT: 61 IN | SYSTOLIC BLOOD PRESSURE: 138 MMHG | OXYGEN SATURATION: 97 % | TEMPERATURE: 97.5 F | BODY MASS INDEX: 27.75 KG/M2 | DIASTOLIC BLOOD PRESSURE: 86 MMHG | HEART RATE: 101 BPM

## 2025-05-05 PROBLEM — F41.1 GAD (GENERALIZED ANXIETY DISORDER): Chronic | Status: RESOLVED | Noted: 2017-07-06 | Resolved: 2025-05-05

## 2025-05-05 LAB
CLOZAPINE SERPL-MCNC: 909 NG/ML (ref 350–900)
EST. AVERAGE GLUCOSE BLD GHB EST-MCNC: 117 MG/DL
HBA1C MFR BLD: 5.7 %

## 2025-05-05 PROCEDURE — 83036 HEMOGLOBIN GLYCOSYLATED A1C: CPT

## 2025-05-05 PROCEDURE — 99238 HOSP IP/OBS DSCHRG MGMT 30/<: CPT | Performed by: NURSE PRACTITIONER

## 2025-05-05 PROCEDURE — 80159 DRUG ASSAY CLOZAPINE: CPT | Performed by: NURSE PRACTITIONER

## 2025-05-05 RX ADMIN — THIAMINE HCL TAB 100 MG 100 MG: 100 TAB at 08:18

## 2025-05-05 RX ADMIN — BISACODYL 10 MG: 5 TABLET, COATED ORAL at 08:18

## 2025-05-05 RX ADMIN — Medication 1000 UNITS: at 08:17

## 2025-05-05 RX ADMIN — ESCITALOPRAM OXALATE 10 MG: 10 TABLET ORAL at 08:17

## 2025-05-05 RX ADMIN — LORAZEPAM 0.5 MG: 0.5 TABLET ORAL at 08:17

## 2025-05-05 RX ADMIN — CYANOCOBALAMIN TAB 500 MCG 500 MCG: 500 TAB at 08:17

## 2025-05-05 RX ADMIN — OXYCODONE HYDROCHLORIDE AND ACETAMINOPHEN 1 TABLET: 5; 325 TABLET ORAL at 07:14

## 2025-05-05 RX ADMIN — FOLIC ACID 1 MG: 1 TABLET ORAL at 08:17

## 2025-05-05 RX ADMIN — OXYBUTYNIN CHLORIDE 5 MG: 5 TABLET ORAL at 08:18

## 2025-05-05 RX ADMIN — LAMOTRIGINE 100 MG: 100 TABLET ORAL at 08:17

## 2025-05-05 RX ADMIN — GUAIFENESIN 600 MG: 600 TABLET ORAL at 08:17

## 2025-05-05 RX ADMIN — OMEGA-3 FATTY ACIDS CAP 1000 MG 1000 MG: 1000 CAP at 08:17

## 2025-05-05 RX ADMIN — PANTOPRAZOLE SODIUM 40 MG: 40 TABLET, DELAYED RELEASE ORAL at 07:14
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
## 2025-05-05 NOTE — PLAN OF CARE
Problem: Alteration in Thoughts and Perception  Goal: Treatment Goal: Gain control of psychotic behaviors/thinking, reduce/eliminate presenting symptoms and demonstrate improved reality functioning upon discharge  Outcome: Adequate for Discharge  Goal: Verbalize thoughts and feelings  Description: Interventions:- Promote a nonjudgmental and trusting relationship with the patient through active listening and therapeutic communication- Assess patient's level of functioning, behavior and potential for risk- Engage patient in 1 on 1 interactions- Encourage patient to express fears, feelings, frustrations, and discuss symptoms  - [place] patient to reality, help patient recognize reality-based thinking - Administer medications as ordered and assess for potential side effects- Provide the patient education related to the signs and symptoms of the illness and desired effects of prescribed medications  Interventions:- Assess and re-assess patient's lethality and potential for self-injury- Engage patient in 1:1 interactions, daily, for a minimum of 15 minutes- Encourage patient to express feelings, fears, frustrations, hopes- Establish rapport/trust with patient   Interventions:- Assess and re-assess patient's level of risk - Engage patient in 1:1 interactions, daily, for a minimum of 15 minutes - Encourage patient to express feelings, fears, frustrations, hopes   Interventions:- Assess and re-assess patient's level of risk, every waking shift- Engage patient in 1:1 interactions, daily, for a minimum of 15 minutes - Allow patient to express feelings and frustrations in a safe and non-threatening manner - Establish rapport/trust with patient   Outcome: Adequate for Discharge  Goal: Refrain from acting on delusional thinking/internal stimuli  Description: Interventions:- Monitor patient closely, per order - Utilize least restrictive measures - Set reasonable limits, give positive feedback for acceptable - Administer  medications as ordered and monitor of potential side effects  Outcome: Adequate for Discharge  Goal: Agree to be compliant with medication regime, as prescribed and report medication side effects  Description: Interventions:- Offer appropriate PRN medication and supervise ingestion; conduct AIMS, as needed   Outcome: Adequate for Discharge  Goal: Recognize dysfunctional thoughts, communicate reality-based thoughts at the time of discharge  Description: Interventions:- Provide medication and psycho-education to assist patient in compliance and developing insight into his/her illness   Outcome: Adequate for Discharge  Goal: Complete daily ADLs, including personal hygiene independently, as able  Description: Interventions:- Observe, teach, and assist patient with ADLS- Monitor and promote a balance of rest/activity, with adequate nutrition and elimination   Interventions:- Observe, teach, and assist patient with ADLS- Monitor and promote a balance of rest/activity, with adequate nutrition and elimination  Interventions:- Observe, teach, and assist patient with ADLS-  Monitor and promote a balance of rest/activity, with adequate nutrition and elimination   Interventions:- Observe, teach, and assist patient with ADLS  Outcome: Adequate for Discharge     Problem: Ineffective Coping  Goal: Identifies ineffective coping skills  Outcome: Adequate for Discharge  Goal: Identifies healthy coping skills  Outcome: Adequate for Discharge  Goal: Demonstrates healthy coping skills  Outcome: Adequate for Discharge  Goal: Participates in unit activities  Description: Interventions:- Provide therapeutic environment - Provide required programming - Redirect inappropriate behaviors   Outcome: Adequate for Discharge  Goal: Patient/Family participate in treatment and DC plans  Description: Interventions:- Provide therapeutic environment  Outcome: Adequate for Discharge  Goal: Patient/Family verbalizes awareness of resources  Outcome:  Adequate for Discharge     Problem: Risk for Self Injury/Neglect  Goal: Verbalize thoughts and feelings  Description: Interventions:- Promote a nonjudgmental and trusting relationship with the patient through active listening and therapeutic communication- Assess patient's level of functioning, behavior and potential for risk- Engage patient in 1 on 1 interactions- Encourage patient to express fears, feelings, frustrations, and discuss symptoms  - [place] patient to reality, help patient recognize reality-based thinking - Administer medications as ordered and assess for potential side effects- Provide the patient education related to the signs and symptoms of the illness and desired effects of prescribed medications  Interventions:- Assess and re-assess patient's lethality and potential for self-injury- Engage patient in 1:1 interactions, daily, for a minimum of 15 minutes- Encourage patient to express feelings, fears, frustrations, hopes- Establish rapport/trust with patient   Interventions:- Assess and re-assess patient's level of risk - Engage patient in 1:1 interactions, daily, for a minimum of 15 minutes - Encourage patient to express feelings, fears, frustrations, hopes   Interventions:- Assess and re-assess patient's level of risk, every waking shift- Engage patient in 1:1 interactions, daily, for a minimum of 15 minutes - Allow patient to express feelings and frustrations in a safe and non-threatening manner - Establish rapport/trust with patient   Outcome: Adequate for Discharge  Goal: Complete daily ADLs, including personal hygiene independently, as able  Description: Interventions:- Observe, teach, and assist patient with ADLS- Monitor and promote a balance of rest/activity, with adequate nutrition and elimination   Interventions:- Observe, teach, and assist patient with ADLS- Monitor and promote a balance of rest/activity, with adequate nutrition and elimination  Interventions:- Observe, teach, and assist  patient with ADLS-  Monitor and promote a balance of rest/activity, with adequate nutrition and elimination   Interventions:- Observe, teach, and assist patient with ADLS  Outcome: Adequate for Discharge  Goal: Treatment Goal: Remain safe during length of stay, learn and adopt new coping skills, and be free of self-injurious ideation, impulses and acts at the time of discharge  Outcome: Adequate for Discharge  Goal: Verbalize thoughts and feelings  Description: Interventions:- Assess and re-assess patient's lethality and potential for self-injury- Engage patient in 1:1 interactions, daily, for a minimum of 15 minutes- Encourage patient to express feelings, fears, frustrations, hopes- Establish rapport/trust with patient   Outcome: Adequate for Discharge  Goal: Refrain from harming self  Description: Interventions:- Monitor patient closely, per order- Develop a trusting relationship- Supervise medication ingestion, monitor effects and side effects   Interventions:- Monitor patient closely, per order- Develop a trusting relationship- Supervise medication ingestion, monitor effects and side effects   Outcome: Adequate for Discharge  Goal: Recognize maladaptive responses and adopt new coping mechanisms  Outcome: Adequate for Discharge  Goal: Attend and participate in unit activities, including therapeutic, recreational, and educational groups  Description: Interventions:- Provide therapeutic and educational activities daily, encourage attendance and participation, and document same in the medical record- Obtain collateral information, encourage visitation and family involvement in care   Interventions:- Provide therapeutic and educational activities daily, encourage attendance and participation, and document same in the medical record - Provide appropriate opportunities for reminiscence - Provide a consistent daily routine - Encourage family contact/visitation   Outcome: Adequate for Discharge     Problem:  Depression  Goal: Verbalize thoughts and feelings  Description: Interventions:- Promote a nonjudgmental and trusting relationship with the patient through active listening and therapeutic communication- Assess patient's level of functioning, behavior and potential for risk- Engage patient in 1 on 1 interactions- Encourage patient to express fears, feelings, frustrations, and discuss symptoms  - [place] patient to reality, help patient recognize reality-based thinking - Administer medications as ordered and assess for potential side effects- Provide the patient education related to the signs and symptoms of the illness and desired effects of prescribed medications  Interventions:- Assess and re-assess patient's lethality and potential for self-injury- Engage patient in 1:1 interactions, daily, for a minimum of 15 minutes- Encourage patient to express feelings, fears, frustrations, hopes- Establish rapport/trust with patient   Interventions:- Assess and re-assess patient's level of risk - Engage patient in 1:1 interactions, daily, for a minimum of 15 minutes - Encourage patient to express feelings, fears, frustrations, hopes   Interventions:- Assess and re-assess patient's level of risk, every waking shift- Engage patient in 1:1 interactions, daily, for a minimum of 15 minutes - Allow patient to express feelings and frustrations in a safe and non-threatening manner - Establish rapport/trust with patient   Outcome: Adequate for Discharge  Goal: Complete daily ADLs, including personal hygiene independently, as able  Description: Interventions:- Observe, teach, and assist patient with ADLS- Monitor and promote a balance of rest/activity, with adequate nutrition and elimination   Interventions:- Observe, teach, and assist patient with ADLS- Monitor and promote a balance of rest/activity, with adequate nutrition and elimination  Interventions:- Observe, teach, and assist patient with ADLS-  Monitor and promote a balance of  rest/activity, with adequate nutrition and elimination   Interventions:- Observe, teach, and assist patient with ADLS  Outcome: Adequate for Discharge  Goal: Treatment Goal: Demonstrate behavioral control of depressive symptoms, verbalize feelings of improved mood/affect, and adopt new coping skills prior to discharge  Outcome: Adequate for Discharge  Goal: Verbalize thoughts and feelings  Description: Interventions:- Assess and re-assess patient's level of risk - Engage patient in 1:1 interactions, daily, for a minimum of 15 minutes - Encourage patient to express feelings, fears, frustrations, hopes   Outcome: Adequate for Discharge  Goal: Refrain from isolation  Description: Interventions:- Develop a trusting relationship - Encourage socialization   Interventions:- Develop a trusting relationship - Encourage socialization   Outcome: Adequate for Discharge  Goal: Refrain from self-neglect  Outcome: Adequate for Discharge  Goal: Refrain from harming self  Description: Interventions:- Monitor patient closely, per order - Supervise medication ingestion, monitor effects and side effects   Outcome: Adequate for Discharge  Goal: Attend and participate in unit activities, including therapeutic, recreational, and educational groups  Description: Interventions:- Provide therapeutic and educational activities daily, encourage attendance and participation, and document same in the medical record   Outcome: Adequate for Discharge     Problem: Anxiety  Goal: Anxiety is at manageable level  Description: Interventions:- Assess and monitor patient's anxiety level. - Monitor for signs and symptoms (heart palpitations, chest pain, shortness of breath, headaches, nausea, feeling jumpy, restlessness, irritable, apprehensive). - Collaborate with interdisciplinary team and initiate plan and interventions as ordered.- [place] patient to unit/surroundings- Explain treatment plan- Encourage participation in care- Encourage verbalization of  concerns/fears- Identify coping mechanisms- Assist in developing anxiety-reducing skills- Administer/offer alternative therapies- Limit or eliminate stimulants  Interventions:- Assess and monitor patient's anxiety level. - Monitor for signs and symptoms (heart palpitations, chest pain, shortness of breath, headaches, nausea, feeling jumpy, restlessness, irritable, apprehensive). - Collaborate with interdisciplinary team and initiate plan and interventions as ordered.- [place] patient to unit/surroundings- Explain treatment plan- Encourage participation in care- Encourage verbalization of concerns/fears- Identify coping mechanisms- Assist in developing anxiety-reducing skills- Administer/offer alternative therapies- Limit or eliminate stimulants  Outcome: Adequate for Discharge     Problem: Risk for Violence/Aggression Toward Others  Goal: Verbalize thoughts and feelings  Description: Interventions:- Promote a nonjudgmental and trusting relationship with the patient through active listening and therapeutic communication- Assess patient's level of functioning, behavior and potential for risk- Engage patient in 1 on 1 interactions- Encourage patient to express fears, feelings, frustrations, and discuss symptoms  - [place] patient to reality, help patient recognize reality-based thinking - Administer medications as ordered and assess for potential side effects- Provide the patient education related to the signs and symptoms of the illness and desired effects of prescribed medications  Interventions:- Assess and re-assess patient's lethality and potential for self-injury- Engage patient in 1:1 interactions, daily, for a minimum of 15 minutes- Encourage patient to express feelings, fears, frustrations, hopes- Establish rapport/trust with patient   Interventions:- Assess and re-assess patient's level of risk - Engage patient in 1:1 interactions, daily, for a minimum of 15 minutes - Encourage patient to express feelings,  fears, frustrations, hopes   Interventions:- Assess and re-assess patient's level of risk, every waking shift- Engage patient in 1:1 interactions, daily, for a minimum of 15 minutes - Allow patient to express feelings and frustrations in a safe and non-threatening manner - Establish rapport/trust with patient   Outcome: Adequate for Discharge  Goal: Treatment Goal: Refrain from acts of violence/aggression during length of stay, and demonstrate improved impulse control at the time of discharge  Outcome: Adequate for Discharge  Goal: Refrain from harming others  Outcome: Adequate for Discharge  Goal: Refrain from destructive acts on the environment or property  Outcome: Adequate for Discharge  Goal: Control angry outbursts  Description: Interventions:- Monitor patient closely, per order- Ensure early verbal de-escalation- Monitor prn medication needs- Set reasonable/therapeutic limits, outline behavioral expectations, and consequences - Provide a non-threatening milieu, utilizing the least restrictive interventions   Outcome: Adequate for Discharge  Goal: Attend and participate in unit activities, including therapeutic, recreational, and educational groups  Description: Interventions:- Provide therapeutic and educational activities daily, encourage attendance and participation, and document same in the medical record   Outcome: Adequate for Discharge  Goal: Identify appropriate positive anger management techniques  Description: Interventions:- Offer anger management and coping skills groups - Staff will provide positive feedback for appropriate anger control  Outcome: Adequate for Discharge     Problem: Alteration in Orientation  Goal: Complete daily ADLs, including personal hygiene independently, as able  Description: Interventions:- Observe, teach, and assist patient with ADLS- Monitor and promote a balance of rest/activity, with adequate nutrition and elimination   Interventions:- Observe, teach, and assist patient  with ADLS- Monitor and promote a balance of rest/activity, with adequate nutrition and elimination  Interventions:- Observe, teach, and assist patient with ADLS-  Monitor and promote a balance of rest/activity, with adequate nutrition and elimination   Interventions:- Observe, teach, and assist patient with ADLS  Outcome: Adequate for Discharge  Goal: Attend and participate in unit activities, including therapeutic, recreational, and educational groups  Description: Interventions:- Provide therapeutic and educational activities daily, encourage attendance and participation, and document same in the medical record- Obtain collateral information, encourage visitation and family involvement in care   Interventions:- Provide therapeutic and educational activities daily, encourage attendance and participation, and document same in the medical record - Provide appropriate opportunities for reminiscence - Provide a consistent daily routine - Encourage family contact/visitation   Outcome: Adequate for Discharge  Goal: Treatment Goal: Demonstrate a reduction of confusion and improved orientation to person, place, time and/or situation upon discharge, according to optimum baseline/ability  Outcome: Adequate for Discharge  Goal: Interact with staff daily  Description: Interventions:- Assess and re-assess patient's level of orientation- Engage patient in 1 on 1 interactions, daily, for a minimum of 15 minutes - Establish rapport/trust with patient   Outcome: Adequate for Discharge  Goal: Express concerns related to confused thinking related to:  Description: Interventions:- Encourage patient to express feelings, fears, frustrations, hopes- Assign consistent caregivers - [place]/re-orient patient as needed- Allow comfort items, as appropriate- Provide visual cues, signs, etc.   Outcome: Adequate for Discharge  Goal: Allow medical examinations, as recommended  Description: Interventions:- Provide physical/neurological exams and/or  referrals, per provider   Outcome: Adequate for Discharge  Goal: Cooperate with recommended testing/procedures  Description: Interventions:- Determine need for ancillary testing- Observe for mental status changes- Implement falls/precaution protocol   Outcome: Adequate for Discharge     Problem: DISCHARGE PLANNING - CARE MANAGEMENT  Goal: Discharge to post-acute care or home with appropriate resources  Description: INTERVENTIONS:- Conduct assessment to determine patient/family and health care team treatment goals, and need for post-acute services based on payer coverage, community resources, and patient preferences, and barriers to discharge- Address psychosocial, clinical, and financial barriers to discharge as identified in assessment in conjunction with the patient/family and health care team- Arrange appropriate level of post-acute services according to patient’s   needs and preference and payer coverage in collaboration with the physician and health care team- Communicate with and update the patient/family, physician, and health care team regarding progress on the discharge plan- Arrange appropriate transportation to post-acute venues  INTERVENTIONS:- Conduct assessment to determine patient/family and health care team treatment goals, and need for post-acute services based on payer coverage, community resources, and patient preferences, and barriers to discharge- Address psychosocial, clinical, and financial barriers to discharge as identified in assessment in conjunction with the patient/family and health care team- Arrange appropriate level of post-acute services according to patient’s   needs and preference and payer coverage in collaboration with the physician and health care team- Communicate with and update the patient/family, physician, and health care team regarding progress on the discharge plan- Arrange appropriate transportation to post-acute venues  Outcome: Adequate for Discharge     Problem:  Nutrition/Hydration-ADULT  Goal: Nutrient/Hydration intake appropriate for improving, restoring or maintaining nutritional needs  Description: Monitor and assess patient's nutrition/hydration status for malnutrition. Collaborate with interdisciplinary team and initiate plan and interventions as ordered.  Monitor patient's weight and dietary intake as ordered or per policy. Utilize nutrition screening tool and intervene as necessary. Determine patient's food preferences and provide high-protein, high-caloric foods as appropriate. INTERVENTIONS:- Monitor oral intake, urinary output, labs, and treatment plans- Assess nutrition and hydration status and recommend course of action- Evaluate amount of meals eaten- Assist patient with eating if necessary - Allow adequate time for meals- Recommend/ encourage appropriate diets, oral nutritional supplements, and vitamin/mineral supplements- Order, calculate, and assess calorie counts as needed- Recommend, monitor, and adjust tube feedings and TPN/PPN based on assessed needs- Assess need for intravenous fluids- Provide specific nutrition/hydration education as appropriate- Include patient/family/caregiver in decisions related to nutrition  Outcome: Adequate for Discharge

## 2025-05-05 NOTE — PROGRESS NOTES
"Progress Note - Jo-Ann Lamb 54 y.o. female MRN: [id number]    Unit/Bed#: [facility] 218-01 Encounter: [id number]        Subjective:   Patient seen and examined at bedside after reviewing the chart and discussing the case with the caring staff.      Patient examined at bedside.  Patient denies any acute symptoms.     Patient is being discharged Monday, 5/5/25.    Physical Exam   Vitals: Blood pressure 138/86, pulse 101, temperature 97.5 °F (36.4 °C), temperature source Temporal, resp. rate 18, height 5' 1\" (1.549 m), weight 66.7 kg (147 lb), last menstrual period 01/01/2020, SpO2 97%, not currently breastfeeding.,Body mass index is 27.78 kg/m².  Constitutional: Patient in no acute distress.  HEENT: PERR, EOMI, MMM.  Cardiovascular: Normal rate and regular rhythm.    Pulmonary/Chest: Effort normal and breath sounds normal.   Abdomen: Soft, + BS, NT.    Assessment/Plan:  Jo-Ann Lamb is a(n) 54 y.o. year old female with Schizoaffective DO     Medical Clearance: Patient is medically cleared for discharge. All prescriptions have been sent to pharmacy.     HTN.  No longer taking Losartan 50 mg daily.  Will continue to monitor.   HLD.  Continue fish oil daily.  Restart atorvastatin 10 mg daily 4/24.     Asthma.  Patient is on Breo Ellipta daily (Advair nonform), albuterol inhaler as needed.   Allergic rhinitis.  Stable.  Declines previous Flonase -believes it is causing sore throat.   GERD.  Patient on Protonix 40 mg daily.   Urge incontinence.  Continue oxybutynin 5 mg twice daily.   Constipation/hemorrhoids.  Increase Dulcolax 10 mg twice daily 4/29/2025.  Miralax or Senokot as needed.  Apply Anusol-HC as needed.    Chronic back w/ radiculopathy.  Patient follows with pain management.  Patient on Percocet 5-325 mg q8h as needed for breakthrough pain.  PDMP reviewed.  Patient also receiving epidural injections to lower back outpt.  Tylenol as needed for mild-moderate pain.  Patient may get Lidoderm patch over the back.  Robaxin " as needed for muscle spasms.   Tobacco abuse.  NRT.   Vitamin D deficiency.  Continue vitamin D3 1000 units daily.  Recheck levels.   Vitamin B12 deficiency.  Continue B12 500 mcg daily.  Recheck levels.   Sore throat/mouth pain.  Cough drops or Magic mouthwash as needed per Dr. Steel.  Chloraseptic spray as needed 4/25.   Chest congestion/cough.  Patient treated with azithromycin for mycoplasma pneumonia 4/10.  Start Augmentin 875-125 mg twice daily x 7 days.  Patient with frequent infections -needs to follow up outpatient.     The patient was discussed with Dr. Steel and he is in agreement with the above note.

## 2025-05-05 NOTE — PROGRESS NOTES
05/05/25 0808    Discharge Notification   Notification of Discharge Provided to: ACT Team;Residence;Psychiatrist;Therapist;PCP;Family   ACT Team Notified via: Phone call;Fax   Family Notified via: Phone call   PCP Notified via: Phone call;Fax   Psychiatrist Notified via: Phone call;Fax   Residence Notified via: Fax;Phone call   Therapist Notified via: Fax;Phone call     [phone]. As per Chel TCM appts can not be scheduled until 24hrs post d/c. They will contact pt to schedule post d/c.      CM left  for Jacob [phone] notifying of d/c.  CM to send AVS    CM spoke to Dandre MCNAMARA [facility] [phone]. Confirmed d/c and supports today. Dandre in agreement.

## 2025-05-05 NOTE — DISCHARGE INSTR - AVS FIRST PAGE
You received Uzedy subcutaneous injection 100 mg on 5/1/25.   Your next injection is due on 5/31/25 and should be given in the left arm.

## 2025-05-05 NOTE — NURSING NOTE
Patient A&Ox4 on discharge, denies SI/HI. Taking medications as ordered. AVS reviewed with patient, Patient voiced understanding. Left unit with staff to meet boyfriend with belongings. Q 15 maintained.

## 2025-05-05 NOTE — NURSING NOTE
Patient was again withdrawn to her room and sleeping for most of the evening. She reports looking forward to discharge. She denies any concerns or apprehensions outside of physical ailments. She denies SI/HI. Medication compliant. Q15 minute checks ongoing.

## 2025-05-05 NOTE — BH TRANSITION RECORD
Contact Information: If you have any questions, concerns, pended studies, tests and/or procedures, or emergencies regarding your inpatient behavioral health visit. Please contact [facility] [phone] and ask to speak to a , nurse or physician. A contact is available 24 hours/ 7 days a week at this number.     Summary of Procedures Performed During your Stay:  Below is a list of major procedures performed during your hospital stay and a summary of results:  - No major procedures performed.    Pending Studies (From admission, onward)       Start     Ordered    05/05/25 0600  Clozapine-[facility]  Once         05/01/25 1421    05/05/25 0600  Hemoglobin A1C  Once         05/03/25 1503                  Please follow up on the above pending studies with your PCP and/or referring provider.

## 2025-05-05 NOTE — PLAN OF CARE
Problem: DISCHARGE PLANNING - CARE MANAGEMENT  Goal: Discharge to post-acute care or home with appropriate resources  Description: INTERVENTIONS:- Conduct assessment to determine patient/family and health care team treatment goals, and need for post-acute services based on payer coverage, community resources, and patient preferences, and barriers to discharge- Address psychosocial, clinical, and financial barriers to discharge as identified in assessment in conjunction with the patient/family and health care team- Arrange appropriate level of post-acute services according to patient’s   needs and preference and payer coverage in collaboration with the physician and health care team- Communicate with and update the patient/family, physician, and health care team regarding progress on the discharge plan- Arrange appropriate transportation to post-acute venues  INTERVENTIONS:- Conduct assessment to determine patient/family and health care team treatment goals, and need for post-acute services based on payer coverage, community resources, and patient preferences, and barriers to discharge- Address psychosocial, clinical, and financial barriers to discharge as identified in assessment in conjunction with the patient/family and health care team- Arrange appropriate level of post-acute services according to patient’s   needs and preference and payer coverage in collaboration with the physician and health care team- Communicate with and update the patient/family, physician, and health care team regarding progress on the discharge plan- Arrange appropriate transportation to post-acute venues  Outcome: Completed

## 2025-05-05 NOTE — PROGRESS NOTES
05/05/25 9:00 am   Team Meeting   Meeting Type Daily Rounds   Team Members Present   Team Members Present Physician;;Nurse;;Occupational Therapist   Physician Team Member Tatiana LARSON, Jose Juan LARSON, Luis Daniel COLEMAN   Nursing Team Member YUDY Velez   Care Management Team Member MS Luis Daniel   Social Work Team Member HEIKE Du   OT Team Member BETTYE Saini   Patient/Family Present   Patient Present No   Patient's Family Present No   D/C @ 11:00 today to Ervin JEFFRIES with [facility]

## 2025-05-05 NOTE — SOCIAL WORK
CM spoke to Sidney LYNCH in-person after his visit with pt on unit. Sidney stated that pt appears to be close to her baseline.  He is in agreement to monitor over weekend and d/c Mon.

## 2025-05-05 NOTE — DISCHARGE INSTR - OTHER ORDERS
You are being discharged to your home at [address] [facility] [address]  TELEPHONE [phone]     Triggers you have identified during your hospitalization that led to your admission of a regressed mood include ineffective coping skills. Coping skills you have identified during your hospitalization include arts and crafts, listening to music, and [place] [employer]. If you are unable to deal with your distressed mood alone please contact a member of your ACT Team [phone]. If that is not effective and you continue to have (ex: suicidal ideation, homicidal ideation, distressed mood, overwhelmed, in crisis) please contact Crisis by dialing 555, call [employer] 1 [phone], dial 789 or go to the nearest emergency center.      *[facility] Crisis Hotline: 461.943.5347  *[place] Suicide Prevention Lifeline:  1-650.223.2766  *Alcohol Anonymous: 571.697.3027  *[facility] Drug & Alcohol [facility]: (181) 110-5606  *National [place] on Mental Illness (MARIA[place]) HELPLINE: 654.369.9344/Website: www.mariana.org  *Substance Abuse and Mental Health Services Administration([facility]) National Helpline, which is a confidential, free, 24-hour-a-day, 365-day-a-year, information service for individuals and family members facing mental health and/or substance use disorders. This service provides referrals to local treatment facilities, support groups, and community-based organizations. Callers can also order free publications and other information.  Call 1-791.112.3876/Website: www.[facility].gov  *[facility] 2-1-1: This is a toll free, confidential, 24-hour-a-day service which connects you to a community  in your area who can help you find services and resources that are available to you locally and provide critical services that can improve and save lives.  Call: 211  /Website: http://www.[employer].org/     You declined drug and alcohol treatment. If you choose to change your mind please call  [facility] Drug & Alcohol [facility]: ([phone]) [phone].       Demetra, or Sandhya, our Behavioral Health Nurse Navigators, will be calling you after your discharge, on the phone number that you provided.  They will be available as an additional support, if needed.   If you wish to speak with one of them, you may contact Demetra at [phone] or Sandhya at [phone]    Outpatient Drug & Alcohol Treatment   The [facility] currently operates an outpatient treatment unit:  [facility] in [place], [place] as a functional unit of the [facility]  The Outpatient treatment units are licensed by the [place] Department of Drug & Alcohol Programs to provide individual and group counseling for those with substance abuse and dependency problems. The clinical staff is experienced in a variety of therapeutic modalities and provides treatment that is individualized to meet the particular needs of each person. These units are drug-free treatment programs.  The [facility] accepts most major healthcare insurance coverage plans, [place] Medical Assistance and in those cases where the consumer has no third party healthcare coverage, a liberal sliding fee schedule is utilized. The length of service and type of outpatient service provided is based on the results of the Level of Care Assessment            There are currently three treatment protocols available:  Outpatient  Intensive Outpatient  Contracted services for Partial Hospitalization  Therapy is provided in both Individual and Group counseling formats. The Outpatient department offers individual counseling for the family members of substance abusers to address co-dependent and enabling behaviors.    Outpatient treatment services in [facility] are purchased through a fee-for-service subcontract with:  [facility]  [address] [place] [address]   Phone: [phone]    [facility] Outpatient  [address]  [place] [address]   New Admissions [phone]  Local office [phone]  Walk in hours M/W 9am-1pm Tues 11am-5pm and Thurs 2pm-6pm    Outpatient treatment services in [facility] are purchased through fee-for-service subcontracts with:  [facility]   [address]  [place], [place] [address]  Phone: [phone]    [facility]  [address]  [place] [address]  Phone: New Admissions [phone] / Local Office [phone]  Outpatient treatment services are also available to [facility] residents in our [facility] Office.

## 2025-05-05 NOTE — TELEPHONE ENCOUNTER
Yany a  with [facility] calling to schedule TCM. Advised of need to be scheduled 24hrs post discharge. She expressed understanding. Requests office reach out tomorrow as patient is being discharged today.

## 2025-05-06 ENCOUNTER — TRANSITIONAL CARE MANAGEMENT (OUTPATIENT)
Dept: FAMILY MEDICINE CLINIC | Facility: CLINIC | Age: 55
End: 2025-05-06

## 2025-05-06 ENCOUNTER — TELEPHONE (OUTPATIENT)
Age: 55
End: 2025-05-06

## 2025-05-09 ENCOUNTER — DOCUMENTATION (OUTPATIENT)
Dept: GASTROENTEROLOGY | Facility: CLINIC | Age: 55
End: 2025-05-09

## 2025-05-09 NOTE — PROGRESS NOTES
Records reviewed and outlined below in preparation for office visit 5/12/2025;    4/22/2025 laboratory data  Sodium 130  Potassium 4.2  BUN 9  Creatinine 0.55  AST 12  ALT 10  Alk phos 76  Albumin 4.1  T. bili 0.18  Cholesterol 220  Triglycerides 230    Folate 18.7  Vitamin B12 519  Vitamin D 39.1  WC 11.30 Hgb 13.5 HCT 42 MCV 95 platelet count 308,000  Clozapine level 140 4 repeat was 655  Hemoglobin A1c 5.7 on 5/5/2025  Urine drug screen negative        2/10/2025 urgent care visit for GI symptoms  History of chronic GI problems  10 days of diarrhea and vomiting and abdominal pain    4/26//2024 CT scan chest lung cancer screening  Mild emphysema  Unchanged 0.8 x 0.6 cm left lower lobe cyst with moderately thickened walls  Visualized upper abdomen structures unremarkable    9/20/2023 colonoscopy Dr. Nguyen   Dix bowel prep score of 2 however reportedly fair preparation  Mild pancolonic edematous and erythematous mucosa.  Biopsy of mild acute nonspecific colitis.  Differential diagnosis some broad includes but bowel preparation, acute self-limited colitis, infection, ischemia, drugs.  Rectal polyp-biopsy revealed hyperplastic polyp   Repeat colonoscopy 3 years was recommended    10/22/2022 CT scan abdomen pelvis with and without contrast  Epiploic appendagitis anterior aspect of proximal  sigmoid colon within the left lower quadrant with surrounding inflammatory change.

## 2025-05-12 ENCOUNTER — OFFICE VISIT (OUTPATIENT)
Dept: GASTROENTEROLOGY | Facility: CLINIC | Age: 55
End: 2025-05-12

## 2025-05-12 ENCOUNTER — TELEPHONE (OUTPATIENT)
Age: 55
End: 2025-05-12

## 2025-05-12 VITALS
HEART RATE: 89 BPM | SYSTOLIC BLOOD PRESSURE: 144 MMHG | OXYGEN SATURATION: 95 % | WEIGHT: 152.2 LBS | TEMPERATURE: 97.5 F | RESPIRATION RATE: 16 BRPM | BODY MASS INDEX: 28.74 KG/M2 | DIASTOLIC BLOOD PRESSURE: 90 MMHG | HEIGHT: 61 IN

## 2025-05-12 DIAGNOSIS — Z12.11 COLON CANCER SCREENING: ICD-10-CM

## 2025-05-12 DIAGNOSIS — K21.9 GASTROESOPHAGEAL REFLUX DISEASE, UNSPECIFIED WHETHER ESOPHAGITIS PRESENT: Primary | Chronic | ICD-10-CM

## 2025-05-12 DIAGNOSIS — K59.01 SLOW TRANSIT CONSTIPATION: ICD-10-CM

## 2025-05-12 DIAGNOSIS — K64.9 HEMORRHOIDS, UNSPECIFIED HEMORRHOID TYPE: ICD-10-CM

## 2025-05-12 RX ORDER — HYDROCORTISONE 25 MG/G
CREAM TOPICAL 2 TIMES DAILY
Qty: 28 G | Refills: 0 | Status: SHIPPED | OUTPATIENT
Start: 2025-05-12

## 2025-05-12 RX ORDER — FAMOTIDINE 40 MG/1
40 TABLET, FILM COATED ORAL
Qty: 30 TABLET | Refills: 3 | Status: SHIPPED | OUTPATIENT
Start: 2025-05-12

## 2025-05-12 RX ORDER — OMEPRAZOLE 40 MG/1
40 CAPSULE, DELAYED RELEASE ORAL DAILY
Qty: 90 CAPSULE | Refills: 3 | Status: SHIPPED | OUTPATIENT
Start: 2025-05-12

## 2025-05-12 RX ORDER — BISACODYL 5 MG/1
5 TABLET, DELAYED RELEASE ORAL DAILY
COMMUNITY

## 2025-05-12 RX ORDER — SODIUM CHLORIDE, SODIUM LACTATE, POTASSIUM CHLORIDE, CALCIUM CHLORIDE 600; 310; 30; 20 MG/100ML; MG/100ML; MG/100ML; MG/100ML
125 INJECTION, SOLUTION INTRAVENOUS CONTINUOUS
OUTPATIENT
Start: 2025-05-12

## 2025-05-12 RX ORDER — POLYETHYLENE GLYCOL 3350 17 G/17G
17 POWDER, FOR SOLUTION ORAL 2 TIMES DAILY
Qty: 1020 G | Refills: 6 | Status: SHIPPED | OUTPATIENT
Start: 2025-05-12

## 2025-05-12 NOTE — ASSESSMENT & PLAN NOTE
Patient reports having acid reflux and heartburn dating back several years.  She has been using Tums almost on a daily basis despite the use of pantoprazole 40 mg daily.  I would recommend changing pantoprazole to omeprazole 40 mg once a day, best to take 30 minutes to 1 hour before 1 meal a day.  Add famotidine 40 mg 2 hours before bed.  Schedule upper endoscopy given persistent symptoms despite PPI use.    Lifestyle modifications for gastroesophageal reflux disease were discussed and include limiting fried and fatty foods, mints, chocolates, carbonated and caffeinated beverages , and alcohol, etc.  Avoid lying down for 2-3 hours after meals.  If you have nighttime symptoms consider raising the head of the bed up on 4-6 inch blocks.  Pillows typically are not useful.  If you are overweight, weight loss will be helpful.    I explained to the patient the procedure of upper endoscopy as well as its potential risk which are approximately 1 in 1000 chance of bleeding, infection, and perforation.    Orders:  •  EGD; Future  •  omeprazole (PriLOSEC) 40 MG capsule; Take 1 capsule (40 mg total) by mouth daily  •  famotidine (PEPCID) 40 MG tablet; Take 1 tablet (40 mg total) by mouth daily at bedtime Take in evening 2 hours prior to bed

## 2025-05-12 NOTE — PATIENT INSTRUCTIONS
Assessment & Plan  Gastroesophageal reflux disease, unspecified whether esophagitis present  Patient reports having acid reflux and heartburn dating back several years.  She has been using Tums almost on a daily basis despite the use of pantoprazole 40 mg daily.  I would recommend changing pantoprazole to omeprazole 40 mg once a day, best to take 30 minutes to 1 hour before 1 meal a day.  Add famotidine 40 mg 2 hours before bed.  Schedule upper endoscopy given persistent symptoms despite PPI use.    Lifestyle modifications for gastroesophageal reflux disease were discussed and include limiting fried and fatty foods, mints, chocolates, carbonated and caffeinated beverages , and alcohol, etc.  Avoid lying down for 2-3 hours after meals.  If you have nighttime symptoms consider raising the head of the bed up on 4-6 inch blocks.  Pillows typically are not useful.  If you are overweight, weight loss will be helpful.    I explained to the patient the procedure of upper endoscopy as well as its potential risk which are approximately 1 in 1000 chance of bleeding, infection, and perforation.      Orders:    EGD; Future    omeprazole (PriLOSEC) 40 MG capsule; Take 1 capsule (40 mg total) by mouth daily    famotidine (PEPCID) 40 MG tablet; Take 1 tablet (40 mg total) by mouth daily at bedtime Take in evening 2 hours prior to bed    Slow transit constipation    Patient has a long history of constipation which predates her narcotic use.  I suspect her narcotic use is contributing to her constipation.  She would like to use MiraLAX 1 dose twice a day.  I have no objection if she wanted to take 1 or 2 bisacodyl as well as long as she does not get excessive diarrhea.  Alternative agents that she could consider would be Amitiza 24 mcg twice a day.  She did request a prescription for MiraLAX therefore I will see if I can order that for her.    I also suggested the addition of Benefiber 2 teaspoonfuls mixed in 6 to 8 ounce of  noncarbonated liquid daily.  She does have fiber capsules at home but she is not sure which one she has.  She will look to see with the recommended dosages and do the standard dose once a day.  She should try to drink 6-8 eight  ounce glasses of water per day  If symptoms persist consider anorectal manometry to evaluate for dyssynergic defecation.    Orders:    Ambulatory Referral to Gastroenterology    polyethylene glycol (GLYCOLAX) 17 GM/SCOOP powder; Take 17 g by mouth 2 (two) times a day    Colon cancer screening  Patient is actually up-to-date with colonoscopy.  I did review her prior colonoscopy report Done September of 2023 and they recommended repeat colonoscopy in 3 years which would be September 2026.  Patient does not have an anemia.  There is really been no change in her bowel pattern as she has been struggling with constipation for many years.  After discussing with her that she had a colonoscopy in September 2023 she was okay with waiting until September 2026 for this procedure.    She should certainly let us know if there is a change that would prompt a colonoscopy which would include significant rectal bleeding or black stools, anemia, excetra.    Orders:    Ambulatory Referral to Gastroenterology    Hemorrhoids, unspecified hemorrhoid type  Patient reports that she has been having some problems with hemorrhoids mainly with bulging since she has been straining a lot.  I did suggest if symptoms persist with improved bowel function with the addition of laxatives, considering colorectal surgical consultation.  Fiber supplementation will also be helpful.  We did discuss use of fiber.  She can try a sitz bath's 20 minutes a day when this is active.  She can also try hydrocortisone cream applied to the anal region 2 times per day for 7 to 10 days when they are active.  If no improvement consider colorectal surgical consultation.    Orders:    hydrocortisone (ANUSOL-HC) 2.5 % rectal cream; Apply topically 2  (two) times a day Apply to anal area 2 times per day for 7-10 days.  Do not exceed 10 days in 1 month.
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
## 2025-05-12 NOTE — ASSESSMENT & PLAN NOTE
Patient reports that she has been having some problems with hemorrhoids mainly with bulging since she has been straining a lot.  I did suggest if symptoms persist with improved bowel function with the addition of laxatives, considering colorectal surgical consultation.  Fiber supplementation will also be helpful.  We did discuss use of fiber.  She can try a sitz bath's 20 minutes a day when this is active.  She can also try hydrocortisone cream applied to the anal region 2 times per day for 7 to 10 days when they are active.  If no improvement consider colorectal surgical consultation.  Orders:  •  hydrocortisone (ANUSOL-HC) 2.5 % rectal cream; Apply topically 2 (two) times a day Apply to anal area 2 times per day for 7-10 days.  Do not exceed 10 days in 1 month.

## 2025-05-12 NOTE — ASSESSMENT & PLAN NOTE
Patient has a long history of constipation which predates her narcotic use.  I suspect her narcotic use is contributing to her constipation.  She would like to use MiraLAX 1 dose twice a day.  I have no objection if she wanted to take 1 or 2 bisacodyl as well as long as she does not get excessive diarrhea.  Alternative agents that she could consider would be Amitiza 24 mcg twice a day.  She did request a prescription for MiraLAX therefore I will see if I can order that for her.    I also suggested the addition of Benefiber 2 teaspoonfuls mixed in 6 to 8 ounce of noncarbonated liquid daily.  She does have fiber capsules at home but she is not sure which one she has.  She will look to see with the recommended dosages and do the standard dose once a day.  She should try to drink 6-8 eight  ounce glasses of water per day  If symptoms persist consider anorectal manometry to evaluate for dyssynergic defecation.    Orders:  •  Ambulatory Referral to Gastroenterology  •  polyethylene glycol (GLYCOLAX) 17 GM/SCOOP powder; Take 17 g by mouth 2 (two) times a day

## 2025-05-13 ENCOUNTER — TELEPHONE (OUTPATIENT)
Age: 55
End: 2025-05-13

## 2025-05-13 NOTE — TELEPHONE ENCOUNTER
PA for omeprazole 40 mg     SUBMITTED to [employer]    via    [x]CMM-KEY:  (Key: [id number]  PA Case ID #: [id number]      [x]PA sent as URGENT    All office notes, labs and other pertaining documents and studies sent. Clinical questions answered. Awaiting determination from insurance company.     Turnaround time for your insurance to make a decision on your Prior Authorization can take 7-21 business days.

## 2025-05-14 ENCOUNTER — APPOINTMENT (OUTPATIENT)
Dept: LAB | Facility: CLINIC | Age: 55
End: 2025-05-14
Attending: PSYCHIATRY & NEUROLOGY
Payer: COMMERCIAL

## 2025-05-14 ENCOUNTER — APPOINTMENT (OUTPATIENT)
Dept: RADIOLOGY | Facility: CLINIC | Age: 55
End: 2025-05-14
Attending: FAMILY MEDICINE
Payer: COMMERCIAL

## 2025-05-14 DIAGNOSIS — J01.01 ACUTE RECURRENT MAXILLARY SINUSITIS: ICD-10-CM

## 2025-05-14 DIAGNOSIS — Z79.899 ENCOUNTER FOR LONG-TERM (CURRENT) USE OF MEDICATIONS: ICD-10-CM

## 2025-05-14 LAB
BASOPHILS # BLD AUTO: 0.05 THOUSANDS/ÂΜL (ref 0–0.1)
BASOPHILS NFR BLD AUTO: 0 % (ref 0–1)
EOSINOPHIL # BLD AUTO: 0.2 THOUSAND/ÂΜL (ref 0–0.61)
EOSINOPHIL NFR BLD AUTO: 2 % (ref 0–6)
ERYTHROCYTE [DISTWIDTH] IN BLOOD BY AUTOMATED COUNT: 14 % (ref 11.6–15.1)
HCT VFR BLD AUTO: 39.6 % (ref 34.8–46.1)
HGB BLD-MCNC: 12.7 G/DL (ref 11.5–15.4)
IMM GRANULOCYTES # BLD AUTO: 0.19 THOUSAND/UL (ref 0–0.2)
IMM GRANULOCYTES NFR BLD AUTO: 2 % (ref 0–2)
LYMPHOCYTES # BLD AUTO: 2.23 THOUSANDS/ÂΜL (ref 0.6–4.47)
LYMPHOCYTES NFR BLD AUTO: 19 % (ref 14–44)
MCH RBC QN AUTO: 29.8 PG (ref 26.8–34.3)
MCHC RBC AUTO-ENTMCNC: 32.1 G/DL (ref 31.4–37.4)
MCV RBC AUTO: 93 FL (ref 82–98)
MONOCYTES # BLD AUTO: 0.72 THOUSAND/ÂΜL (ref 0.17–1.22)
MONOCYTES NFR BLD AUTO: 6 % (ref 4–12)
NEUTROPHILS # BLD AUTO: 8.53 THOUSANDS/ÂΜL (ref 1.85–7.62)
NEUTS SEG NFR BLD AUTO: 71 % (ref 43–75)
NRBC BLD AUTO-RTO: 0 /100 WBCS
PLATELET # BLD AUTO: 346 THOUSANDS/UL (ref 149–390)
PMV BLD AUTO: 8.7 FL (ref 8.9–12.7)
RBC # BLD AUTO: 4.26 MILLION/UL (ref 3.81–5.12)
WBC # BLD AUTO: 11.92 THOUSAND/UL (ref 4.31–10.16)

## 2025-05-14 PROCEDURE — 36415 COLL VENOUS BLD VENIPUNCTURE: CPT

## 2025-05-14 PROCEDURE — 70210 X-RAY EXAM OF SINUSES: CPT

## 2025-05-14 PROCEDURE — 85025 COMPLETE CBC W/AUTO DIFF WBC: CPT

## 2025-05-14 NOTE — TELEPHONE ENCOUNTER
PA for   OMEPRAZOLE 40MG APPROVED     Date(s) approved 5/13/25-6/13/25        Patient advised by          [][employer] Message  [x]Phone call   []LMOM  []L/M to call office as no active Communication consent on file  []Unable to leave detailed message as VM not approved on Communication consent       Pharmacy advised by    [x]Fax  []Phone call  []Secure Chat    Specialty Pharmacy    []     Approval letter scanned into Media Yes

## 2025-05-15 ENCOUNTER — NURSE TRIAGE (OUTPATIENT)
Age: 55
End: 2025-05-15

## 2025-05-15 ENCOUNTER — TELEPHONE (OUTPATIENT)
Dept: FAMILY MEDICINE CLINIC | Facility: CLINIC | Age: 55
End: 2025-05-15

## 2025-05-15 NOTE — TELEPHONE ENCOUNTER
Called patient and left message on voicemail to inform her that she did No Show for her scheduled appointment on 5/15/2025 at 2:30 pm.  Asked patient to call the office to reschedule her TCM appointment. When patient calls back please reschedule her appointment.

## 2025-05-15 NOTE — TELEPHONE ENCOUNTER
FOLLOW UP: please follow up with patient as we were unable to reach her after attempting 3 times. See above message when she originally call trying to rescheduled her appointment today at 2:30 pm and stating having chest pains.    REASON FOR CONVERSATION: No Triage Call    SYMPTOMS: see above message     OTHER: n/a     DISPOSITION: No Contact Call

## 2025-05-15 NOTE — TELEPHONE ENCOUNTER
Regarding: CHEST PAINS (on & off)  ----- Message from Kinza NARANJO sent at 5/15/2025 10:22 AM EDT -----  Pt called in wanting to r/s her 2:30 Appt she has today with PCP b/c she is having chest pains.     Pt appears to be anxious b/c she really wants to know what her test results are?  Attempted to r/s TCM for an earlier time, but PCP didn't have anything available.     Inquired to pt if she would like to speak with a nurse regarding her chest pain, pt declined both times I asked and offered.     Please follow-up with pt due to her stating she has been having chest pain on and off since leaving the hospital.  States she was on some medications there that she no longer has anymore at the moment. Thank you!    Jo-Ann: [phone]

## 2025-05-21 ENCOUNTER — APPOINTMENT (OUTPATIENT)
Dept: LAB | Facility: CLINIC | Age: 55
End: 2025-05-21
Attending: PSYCHIATRY & NEUROLOGY
Payer: COMMERCIAL

## 2025-05-21 DIAGNOSIS — Z79.899 ENCOUNTER FOR LONG-TERM (CURRENT) USE OF MEDICATIONS: ICD-10-CM

## 2025-05-21 PROCEDURE — 80307 DRUG TEST PRSMV CHEM ANLYZR: CPT

## 2025-05-21 PROCEDURE — 80365 DRUG SCREENING OXYCODONE: CPT

## 2025-05-21 PROCEDURE — 80324 DRUG SCREEN AMPHETAMINES 1/2: CPT

## 2025-05-21 PROCEDURE — 80359 METHYLENEDIOXYAMPHETAMINES: CPT

## 2025-05-26 LAB
6-ACETYLMORPHINE IA: NEGATIVE NG/ML
ACCEPTABLE CREAT UR QL: 13 MG/DL
AMPHET UR QL CFM: 831 NG/MG CREAT
AMPHET UR QL SCN: ABNORMAL NG/ML
AMPHETAMINES: ABNORMAL
BARBITURATES UR QL SCN: NEGATIVE NG/ML
BENZODIAZ UR QL SCN: NEGATIVE NG/ML
BUPRENORPHINE UR QL CFM: NEGATIVE NG/ML
CANNABINOIDS UR QL SCN: NEGATIVE NG/ML
CARISOPRODOL UR QL: NEGATIVE NG/ML
COCAINE+BZE UR QL SCN: NEGATIVE NG/ML
ETHYL GLUCURONIDE UR QL SCN: NEGATIVE NG/ML
FENTANYL UR QL SCN: NEGATIVE NG/ML
GABAPENTIN SERPLBLD QL SCN: NEGATIVE UG/ML
MDMA UR QL CFM: NOT DETECTED NG/MG CREAT
MDMA UR QL CFM: NOT DETECTED NG/MG CREAT
METHADONE UR QL SCN: NEGATIVE NG/ML
METHAMPHET UR QL CFM: 1362 NG/MG CREAT
NITRITE UR QL STRIP: NEGATIVE UG/ML
NOROXYCODONE UR CFM-MCNC: 1454 NG/MG CREAT
NOROXYMORPHONE/CREAT UR CFM: NOT DETECTED NG/MG CREAT
OPIATES UR QL SCN: NEGATIVE NG/ML
OXYCODONE CLASS: ABNORMAL
OXYCODONE UR QL CFM: 1615 NG/MG CREAT
OXYCODONE+OXYMORPHONE UR QL SCN: ABNORMAL NG/ML
OXYMORPHONE UR QL CFM: 985 NG/MG CREAT
PCP UR QL SCN: NEGATIVE NG/ML
PROPOXYPH UR QL SCN: NEGATIVE NG/ML
SPECIMEN PH ACCEPTABLE UR: 6.2 (ref 4.5–8.9)
TAPENTADOL UR QL SCN: NEGATIVE NG/ML
TRAMADOL UR QL SCN: NEGATIVE NG/ML

## 2025-06-05 ENCOUNTER — OFFICE VISIT (OUTPATIENT)
Dept: FAMILY MEDICINE CLINIC | Facility: CLINIC | Age: 55
End: 2025-06-05
Payer: COMMERCIAL

## 2025-06-05 VITALS
SYSTOLIC BLOOD PRESSURE: 134 MMHG | HEIGHT: 61 IN | RESPIRATION RATE: 16 BRPM | TEMPERATURE: 98.1 F | HEART RATE: 76 BPM | DIASTOLIC BLOOD PRESSURE: 84 MMHG | BODY MASS INDEX: 27.75 KG/M2 | WEIGHT: 147 LBS

## 2025-06-05 DIAGNOSIS — J01.01 ACUTE RECURRENT MAXILLARY SINUSITIS: ICD-10-CM

## 2025-06-05 DIAGNOSIS — K59.01 SLOW TRANSIT CONSTIPATION: ICD-10-CM

## 2025-06-05 DIAGNOSIS — E78.2 MIXED HYPERLIPIDEMIA: ICD-10-CM

## 2025-06-05 DIAGNOSIS — F25.0 SCHIZOAFFECTIVE DISORDER, BIPOLAR TYPE (HCC): Primary | ICD-10-CM

## 2025-06-05 DIAGNOSIS — I10 PRIMARY HYPERTENSION: ICD-10-CM

## 2025-06-05 PROCEDURE — 99214 OFFICE O/P EST MOD 30 MIN: CPT | Performed by: FAMILY MEDICINE

## 2025-06-05 RX ORDER — AMOXICILLIN 500 MG/1
500 CAPSULE ORAL EVERY 8 HOURS SCHEDULED
Qty: 30 CAPSULE | Refills: 0 | Status: SHIPPED | OUTPATIENT
Start: 2025-06-05 | End: 2025-06-15
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
## 2025-06-10 ENCOUNTER — TELEPHONE (OUTPATIENT)
Age: 55
End: 2025-06-10

## 2025-06-10 DIAGNOSIS — J43.9 PULMONARY EMPHYSEMA, UNSPECIFIED EMPHYSEMA TYPE (HCC): Primary | ICD-10-CM

## 2025-06-10 RX ORDER — ALBUTEROL SULFATE 0.83 MG/ML
2.5 SOLUTION RESPIRATORY (INHALATION) EVERY 6 HOURS PRN
Qty: 180 ML | Refills: 1 | Status: SHIPPED | OUTPATIENT
Start: 2025-06-10

## 2025-06-10 NOTE — TELEPHONE ENCOUNTER
Patient called and stated that she is running out of her albuterol nebulizer vials and has only 2 left. Per chart review patient doesn't have the albuterol neb on her medication list. Patient was seen in the office on 6/5/25 for a TCM and states that she still has the ongoing shortness of breath with exertion which is why she wants to use the nebulizer and see if it helps. Patient was advised that I symptoms worsen or fail to improve it was advised by provider to come schedule an appointment. Patient states she wants to try the nebulizer and stated that her nurse that comes weekly checks her oxygen level and its been good. Please review in regards to the albuterol neb Rx and follow up with patient. She would want it to be sent to [facility].

## 2025-06-11 ENCOUNTER — TELEPHONE (OUTPATIENT)
Dept: GASTROENTEROLOGY | Facility: CLINIC | Age: 55
End: 2025-06-11

## 2025-06-11 NOTE — TELEPHONE ENCOUNTER
Called patient, patient picked up and I stated the reason I was calling was to reschedule EGD. Patient hung up the phone. I proceeded to call back and patient did not answer and no voicemail was able to LVM.

## 2025-06-15 DIAGNOSIS — N32.81 OVERACTIVE BLADDER: ICD-10-CM

## 2025-06-15 DIAGNOSIS — F10.10 ALCOHOL ABUSE: ICD-10-CM

## 2025-06-15 RX ORDER — FOLIC ACID 1 MG/1
1000 TABLET ORAL DAILY
Qty: 30 TABLET | Refills: 1 | Status: SHIPPED | OUTPATIENT
Start: 2025-06-15

## 2025-06-15 RX ORDER — OXYBUTYNIN CHLORIDE 5 MG/1
5 TABLET ORAL 2 TIMES DAILY
Qty: 60 TABLET | Refills: 5 | Status: SHIPPED | OUTPATIENT
Start: 2025-06-15

## 2025-06-23 ENCOUNTER — APPOINTMENT (OUTPATIENT)
Dept: LAB | Facility: CLINIC | Age: 55
End: 2025-06-23
Attending: PSYCHIATRY & NEUROLOGY
Payer: COMMERCIAL

## 2025-06-23 DIAGNOSIS — Z79.899 ENCOUNTER FOR LONG-TERM (CURRENT) USE OF MEDICATIONS: ICD-10-CM

## 2025-06-23 LAB
BASOPHILS # BLD AUTO: 0.05 THOUSANDS/ÂΜL (ref 0–0.1)
BASOPHILS NFR BLD AUTO: 0 % (ref 0–1)
EOSINOPHIL # BLD AUTO: 0.32 THOUSAND/ÂΜL (ref 0–0.61)
EOSINOPHIL NFR BLD AUTO: 3 % (ref 0–6)
ERYTHROCYTE [DISTWIDTH] IN BLOOD BY AUTOMATED COUNT: 14.5 % (ref 11.6–15.1)
HCT VFR BLD AUTO: 41.4 % (ref 34.8–46.1)
HGB BLD-MCNC: 13.2 G/DL (ref 11.5–15.4)
IMM GRANULOCYTES # BLD AUTO: 0.09 THOUSAND/UL (ref 0–0.2)
IMM GRANULOCYTES NFR BLD AUTO: 1 % (ref 0–2)
LYMPHOCYTES # BLD AUTO: 2.09 THOUSANDS/ÂΜL (ref 0.6–4.47)
LYMPHOCYTES NFR BLD AUTO: 18 % (ref 14–44)
MCH RBC QN AUTO: 29.3 PG (ref 26.8–34.3)
MCHC RBC AUTO-ENTMCNC: 31.9 G/DL (ref 31.4–37.4)
MCV RBC AUTO: 92 FL (ref 82–98)
MONOCYTES # BLD AUTO: 0.51 THOUSAND/ÂΜL (ref 0.17–1.22)
MONOCYTES NFR BLD AUTO: 4 % (ref 4–12)
NEUTROPHILS # BLD AUTO: 8.79 THOUSANDS/ÂΜL (ref 1.85–7.62)
NEUTS SEG NFR BLD AUTO: 74 % (ref 43–75)
NRBC BLD AUTO-RTO: 0 /100 WBCS
PLATELET # BLD AUTO: 310 THOUSANDS/UL (ref 149–390)
PMV BLD AUTO: 9.9 FL (ref 8.9–12.7)
RBC # BLD AUTO: 4.51 MILLION/UL (ref 3.81–5.12)
WBC # BLD AUTO: 11.85 THOUSAND/UL (ref 4.31–10.16)

## 2025-06-23 PROCEDURE — 85025 COMPLETE CBC W/AUTO DIFF WBC: CPT

## 2025-06-23 PROCEDURE — 36415 COLL VENOUS BLD VENIPUNCTURE: CPT

## 2025-06-27 ENCOUNTER — HOSPITAL ENCOUNTER (EMERGENCY)
Facility: HOSPITAL | Age: 55
Discharge: HOME | End: 2025-06-28
Attending: FAMILY MEDICINE | Admitting: EMERGENCY MEDICINE
Payer: COMMERCIAL

## 2025-06-27 DIAGNOSIS — F10.929 ALCOHOL INTOXICATION (HCC): ICD-10-CM

## 2025-06-27 DIAGNOSIS — F41.9 ANXIETY: Primary | ICD-10-CM

## 2025-06-27 LAB
ALBUMIN SERPL BCG-MCNC: 4.4 G/DL (ref 3.5–5)
ALP SERPL-CCNC: 120 U/L (ref 34–104)
ALT SERPL W P-5'-P-CCNC: 16 U/L (ref 7–52)
AMPHETAMINES SERPL QL SCN: NEGATIVE
ANION GAP SERPL CALCULATED.3IONS-SCNC: 12 MMOL/L (ref 4–13)
AST SERPL W P-5'-P-CCNC: 14 U/L (ref 13–39)
BARBITURATES UR QL: NEGATIVE
BASOPHILS # BLD AUTO: 0.02 THOUSANDS/ÂΜL (ref 0–0.1)
BASOPHILS NFR BLD AUTO: 0 % (ref 0–1)
BENZODIAZ UR QL: NEGATIVE
BILIRUB SERPL-MCNC: 0.22 MG/DL (ref 0.2–1)
BILIRUB UR QL STRIP: NEGATIVE
BUN SERPL-MCNC: 6 MG/DL (ref 5–25)
CALCIUM SERPL-MCNC: 10.1 MG/DL (ref 8.4–10.2)
CHLORIDE SERPL-SCNC: 96 MMOL/L (ref 96–108)
CLARITY UR: CLEAR
CO2 SERPL-SCNC: 27 MMOL/L (ref 21–32)
COCAINE UR QL: NEGATIVE
COLOR UR: YELLOW
CREAT SERPL-MCNC: 0.56 MG/DL (ref 0.6–1.3)
EOSINOPHIL # BLD AUTO: 0.31 THOUSAND/ÂΜL (ref 0–0.61)
EOSINOPHIL NFR BLD AUTO: 3 % (ref 0–6)
ERYTHROCYTE [DISTWIDTH] IN BLOOD BY AUTOMATED COUNT: 14 % (ref 11.6–15.1)
ETHANOL SERPL-MCNC: 71 MG/DL
EXT PREGNANCY TEST URINE: NEGATIVE
EXT. CONTROL: NORMAL
FENTANYL UR QL SCN: NEGATIVE
GFR SERPL CREATININE-BSD FRML MDRD: 106 ML/MIN/1.73SQ M
GLUCOSE SERPL-MCNC: 105 MG/DL (ref 65–140)
GLUCOSE UR STRIP-MCNC: NEGATIVE MG/DL
HCT VFR BLD AUTO: 43.5 % (ref 34.8–46.1)
HGB BLD-MCNC: 13.9 G/DL (ref 11.5–15.4)
HGB UR QL STRIP.AUTO: NEGATIVE
HYDROCODONE UR QL SCN: NEGATIVE
IMM GRANULOCYTES # BLD AUTO: 0.08 THOUSAND/UL (ref 0–0.2)
IMM GRANULOCYTES NFR BLD AUTO: 1 % (ref 0–2)
KETONES UR STRIP-MCNC: NEGATIVE MG/DL
LEUKOCYTE ESTERASE UR QL STRIP: NEGATIVE
LIPASE SERPL-CCNC: 15 U/L (ref 11–82)
LYMPHOCYTES # BLD AUTO: 2.29 THOUSANDS/ÂΜL (ref 0.6–4.47)
LYMPHOCYTES NFR BLD AUTO: 22 % (ref 14–44)
MAGNESIUM SERPL-MCNC: 1.8 MG/DL (ref 1.9–2.7)
MCH RBC QN AUTO: 29.1 PG (ref 26.8–34.3)
MCHC RBC AUTO-ENTMCNC: 32 G/DL (ref 31.4–37.4)
MCV RBC AUTO: 91 FL (ref 82–98)
METHADONE UR QL: NEGATIVE
MONOCYTES # BLD AUTO: 0.76 THOUSAND/ÂΜL (ref 0.17–1.22)
MONOCYTES NFR BLD AUTO: 7 % (ref 4–12)
NEUTROPHILS # BLD AUTO: 7.11 THOUSANDS/ÂΜL (ref 1.85–7.62)
NEUTS SEG NFR BLD AUTO: 67 % (ref 43–75)
NITRITE UR QL STRIP: NEGATIVE
NRBC BLD AUTO-RTO: 0 /100 WBCS
OPIATES UR QL SCN: NEGATIVE
OXYCODONE+OXYMORPHONE UR QL SCN: POSITIVE
PCP UR QL: NEGATIVE
PH UR STRIP.AUTO: 6 [PH]
PLATELET # BLD AUTO: 287 THOUSANDS/UL (ref 149–390)
PMV BLD AUTO: 9 FL (ref 8.9–12.7)
POTASSIUM SERPL-SCNC: 3.6 MMOL/L (ref 3.5–5.3)
PROT SERPL-MCNC: 7.4 G/DL (ref 6.4–8.4)
PROT UR STRIP-MCNC: NEGATIVE MG/DL
RBC # BLD AUTO: 4.78 MILLION/UL (ref 3.81–5.12)
SODIUM SERPL-SCNC: 135 MMOL/L (ref 135–147)
SP GR UR STRIP.AUTO: <=1.005
THC UR QL: NEGATIVE
UROBILINOGEN UR QL STRIP.AUTO: 0.2 E.U./DL
WBC # BLD AUTO: 10.57 THOUSAND/UL (ref 4.31–10.16)

## 2025-06-27 PROCEDURE — 83690 ASSAY OF LIPASE: CPT | Performed by: FAMILY MEDICINE

## 2025-06-27 PROCEDURE — 80307 DRUG TEST PRSMV CHEM ANLYZR: CPT | Performed by: FAMILY MEDICINE

## 2025-06-27 PROCEDURE — 81003 URINALYSIS AUTO W/O SCOPE: CPT | Performed by: FAMILY MEDICINE

## 2025-06-27 PROCEDURE — 81025 URINE PREGNANCY TEST: CPT | Performed by: FAMILY MEDICINE

## 2025-06-27 PROCEDURE — 36415 COLL VENOUS BLD VENIPUNCTURE: CPT | Performed by: FAMILY MEDICINE

## 2025-06-27 PROCEDURE — 85025 COMPLETE CBC W/AUTO DIFF WBC: CPT | Performed by: FAMILY MEDICINE

## 2025-06-27 PROCEDURE — 99284 EMERGENCY DEPT VISIT MOD MDM: CPT

## 2025-06-27 PROCEDURE — 83735 ASSAY OF MAGNESIUM: CPT | Performed by: FAMILY MEDICINE

## 2025-06-27 PROCEDURE — 82077 ASSAY SPEC XCP UR&BREATH IA: CPT | Performed by: FAMILY MEDICINE

## 2025-06-27 PROCEDURE — 99284 EMERGENCY DEPT VISIT MOD MDM: CPT | Performed by: FAMILY MEDICINE

## 2025-06-27 PROCEDURE — 93005 ELECTROCARDIOGRAM TRACING: CPT

## 2025-06-27 PROCEDURE — 80053 COMPREHEN METABOLIC PANEL: CPT | Performed by: FAMILY MEDICINE

## 2025-06-27 RX ORDER — MIRTAZAPINE 15 MG/1
30 TABLET, FILM COATED ORAL
Status: DISCONTINUED | OUTPATIENT
Start: 2025-06-27 | End: 2025-06-27

## 2025-06-27 RX ORDER — LORAZEPAM 1 MG/1
1 TABLET ORAL ONCE
Status: COMPLETED | OUTPATIENT
Start: 2025-06-27 | End: 2025-06-27

## 2025-06-27 RX ORDER — SODIUM CHLORIDE 9 MG/ML
3 INJECTION INTRAVENOUS
Status: DISCONTINUED | OUTPATIENT
Start: 2025-06-27 | End: 2025-06-28 | Stop reason: HOSPADM

## 2025-06-27 RX ORDER — GABAPENTIN 300 MG/1
300 CAPSULE ORAL
Status: DISCONTINUED | OUTPATIENT
Start: 2025-06-27 | End: 2025-06-27

## 2025-06-27 RX ADMIN — LORAZEPAM 1 MG: 1 TABLET ORAL at 14:30

## 2025-06-27 NOTE — ED NOTES
Crisis talked to the pt who denied any lethality. Pt would like to go to rehab for her drinking problems. Pt has active outpatient mental health services.  Attending  notifed.
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
## 2025-06-28 VITALS
OXYGEN SATURATION: 92 % | SYSTOLIC BLOOD PRESSURE: 130 MMHG | HEART RATE: 94 BPM | RESPIRATION RATE: 18 BRPM | TEMPERATURE: 97.7 F | DIASTOLIC BLOOD PRESSURE: 87 MMHG

## 2025-06-28 RX ORDER — LORAZEPAM 1 MG/1
1 TABLET ORAL ONCE
Status: COMPLETED | OUTPATIENT
Start: 2025-06-28 | End: 2025-06-28

## 2025-06-28 RX ORDER — FAMOTIDINE 20 MG/1
40 TABLET, FILM COATED ORAL
Status: DISCONTINUED | OUTPATIENT
Start: 2025-06-28 | End: 2025-06-28 | Stop reason: HOSPADM

## 2025-06-28 RX ORDER — ATORVASTATIN CALCIUM 10 MG/1
10 TABLET, FILM COATED ORAL DAILY
Status: DISCONTINUED | OUTPATIENT
Start: 2025-06-28 | End: 2025-06-28 | Stop reason: HOSPADM

## 2025-06-28 RX ORDER — FLUTICASONE FUROATE AND VILANTEROL 200; 25 UG/1; UG/1
1 POWDER RESPIRATORY (INHALATION)
Status: DISCONTINUED | OUTPATIENT
Start: 2025-06-28 | End: 2025-06-28 | Stop reason: HOSPADM

## 2025-06-28 RX ORDER — ESCITALOPRAM OXALATE 10 MG/1
10 TABLET ORAL DAILY
Status: DISCONTINUED | OUTPATIENT
Start: 2025-06-28 | End: 2025-06-28 | Stop reason: HOSPADM

## 2025-06-28 RX ADMIN — ATORVASTATIN CALCIUM 10 MG: 10 TABLET, FILM COATED ORAL at 08:27

## 2025-06-28 RX ADMIN — LORAZEPAM 1 MG: 1 TABLET ORAL at 02:15

## 2025-06-28 RX ADMIN — ESCITALOPRAM OXALATE 10 MG: 10 TABLET ORAL at 08:27

## 2025-06-28 NOTE — ED NOTES
Attempted fax pt chart to [facility] as requested multiple times, unable to send chart d/t 'busy' on the other end. Will reattempt. Pt resting comfortably.      Sissy Rodriguez RN  06/28/25 2075

## 2025-06-28 NOTE — ED NOTES
Writer spoke with the patient's ACT worker.  She requested that clinical be sent to [facility] - [email or web address].

## 2025-06-28 NOTE — ED CARE HANDOFF
[facility] Warm Handoff Outcome Note    Patient name Jo-Ann Lamb  Location ED 21/ED 21 MRN [id number]  Age: 55 y.o.          Plan Type:  Warm Handoff                                                                                    Plan Date: 6/28/2025  Service:  ED Warm Handoff      Substance Use History:  Alcohol    Warm Handoff Update:  [employer] conducting bed search for client    Warm Handoff Outcome: Withdrawal Management

## 2025-06-28 NOTE — ED NOTES
Pt sleeping, has phone at bedside. Arouses to touch. Waiting for intake phone call.      Sissy Rodriguez RN  06/27/25 4434

## 2025-07-01 LAB
ATRIAL RATE: 99 BPM
P AXIS: 71 DEGREES
PR INTERVAL: 160 MS
QRS AXIS: -1 DEGREES
QRSD INTERVAL: 74 MS
QT INTERVAL: 380 MS
QTC INTERVAL: 487 MS
T WAVE AXIS: 81 DEGREES
VENTRICULAR RATE: 99 BPM

## 2025-07-01 PROCEDURE — 93010 ELECTROCARDIOGRAM REPORT: CPT | Performed by: INTERNAL MEDICINE
